# Patient Record
Sex: MALE | Race: WHITE | NOT HISPANIC OR LATINO | Employment: OTHER | URBAN - METROPOLITAN AREA
[De-identification: names, ages, dates, MRNs, and addresses within clinical notes are randomized per-mention and may not be internally consistent; named-entity substitution may affect disease eponyms.]

---

## 2016-06-06 LAB
CHOLEST/HDLC SERPL: 104 {RATIO}
HDLC SERPL-MCNC: 32 MG/DL
LDLC SERPL CALC-MCNC: 57 MG/DL
TRIGL SERPL-MCNC: 73 MG/DL

## 2016-12-09 LAB — A1C: 7.5

## 2017-01-13 DIAGNOSIS — E11.9 TYPE 2 DIABETES MELLITUS WITHOUT COMPLICATION: ICD-10-CM

## 2017-01-26 ENCOUNTER — TELEPHONE (OUTPATIENT)
Dept: PHYSICAL MEDICINE AND REHAB | Facility: CLINIC | Age: 77
End: 2017-01-26

## 2017-01-26 RX ORDER — TRAMADOL HYDROCHLORIDE 50 MG/1
TABLET ORAL
Qty: 150 TABLET | Refills: 0 | Status: SHIPPED | OUTPATIENT
Start: 2017-01-26 | End: 2017-01-27 | Stop reason: SDUPTHER

## 2017-01-26 NOTE — TELEPHONE ENCOUNTER
Pt is requesting to have a new script for Tramadol 50mg to be sent to [facility] [phone]     It was set to [facility] but he is no longer using that pharmacy     Pt contact number [phone]   Thanks       [facility] [address] [place], [place] - [address]

## 2017-01-27 RX ORDER — TRAMADOL HYDROCHLORIDE 50 MG/1
TABLET ORAL
Qty: 150 TABLET | Refills: 0 | Status: SHIPPED | OUTPATIENT
Start: 2017-01-27 | End: 2017-03-06

## 2017-01-30 ENCOUNTER — TELEPHONE (OUTPATIENT)
Dept: PHYSICAL MEDICINE AND REHAB | Facility: CLINIC | Age: 77
End: 2017-01-30

## 2017-01-30 NOTE — TELEPHONE ENCOUNTER
----- Message from Oleg Shell MD sent at 1/27/2017  4:08 PM CST -----  Contact: self @ [phone] or [phone]  Pls let him know I just sent tramadol to [facility].  ----- Message -----     From: Jesi Krause MA     Sent: 1/27/2017  11:29 AM       To: Oleg Shell MD        ----- Message -----     From: Mila Kennedy     Sent: 1/27/2017  10:47 AM       To: Sumaya CARPENTER Staff    Pts prescription for tramadol (ULTRAM) 50 mg tablet appears to have been sent to [facility] pharmacy.  Pt says he wanted the prescription sent to [facility] pharmacy on [address] in [place].  pls resend.

## 2017-02-15 ENCOUNTER — TELEPHONE (OUTPATIENT)
Dept: FAMILY MEDICINE | Facility: CLINIC | Age: 77
End: 2017-02-15

## 2017-02-15 NOTE — TELEPHONE ENCOUNTER
----- Message from Coco Nunez sent at 2/15/2017  3:12 PM CST -----  Mel [facility], calling to request that a nurse contacts the patient for an appt at [phone]. The patient was discharged from  on the 12th with a life vest. Also, Ms Leal would like the nurse to call her at [phone] Thank you!

## 2017-02-15 NOTE — TELEPHONE ENCOUNTER
Spoke w/PH navigator Mel,  patient need a TCC appt. Scheduled 2/20.  states they will contact patient's daughter for a face to face meeting to discuss (OT, PT, skilled nursing care). request sent to [facility] for records.

## 2017-02-20 DIAGNOSIS — I25.10 CORONARY ARTERY DISEASE INVOLVING NATIVE CORONARY ARTERY OF NATIVE HEART WITHOUT ANGINA PECTORIS: Primary | ICD-10-CM

## 2017-02-22 ENCOUNTER — TELEPHONE (OUTPATIENT)
Dept: CARDIOLOGY | Facility: CLINIC | Age: 77
End: 2017-02-22

## 2017-02-22 NOTE — TELEPHONE ENCOUNTER
Called patient to see how he was doing and to inquire as to what is going on, as he canceled his appointment to see Dr. Sykes tomorrow. Patient stated he is in the hospital at [place]. He has been extremely sob, and now he is on a life vest. Patient asked to get a call back in a week or two to see how he is. Agreed to call patient next week.

## 2017-03-02 ENCOUNTER — OFFICE VISIT (OUTPATIENT)
Dept: PODIATRY | Facility: CLINIC | Age: 77
End: 2017-03-02
Payer: MEDICARE

## 2017-03-02 VITALS
HEIGHT: 68 IN | WEIGHT: 182 LBS | SYSTOLIC BLOOD PRESSURE: 116 MMHG | DIASTOLIC BLOOD PRESSURE: 64 MMHG | BODY MASS INDEX: 27.58 KG/M2

## 2017-03-02 DIAGNOSIS — M20.10 HALLUX ABDUCTO VALGUS, UNSPECIFIED LATERALITY: ICD-10-CM

## 2017-03-02 DIAGNOSIS — L84 CORN OR CALLUS: ICD-10-CM

## 2017-03-02 DIAGNOSIS — E11.49 TYPE II DIABETES MELLITUS WITH NEUROLOGICAL MANIFESTATIONS: ICD-10-CM

## 2017-03-02 DIAGNOSIS — B35.1 ONYCHOMYCOSIS DUE TO DERMATOPHYTE: ICD-10-CM

## 2017-03-02 PROCEDURE — 11721 DEBRIDE NAIL 6 OR MORE: CPT | Mod: 59,Q9,S$GLB, | Performed by: PODIATRIST

## 2017-03-02 PROCEDURE — 99999 PR PBB SHADOW E&M-EST. PATIENT-LVL III: CPT | Mod: PBBFAC,,, | Performed by: PODIATRIST

## 2017-03-02 PROCEDURE — 99499 UNLISTED E&M SERVICE: CPT | Mod: S$PBB,,, | Performed by: PODIATRIST

## 2017-03-02 PROCEDURE — 99499 UNLISTED E&M SERVICE: CPT | Mod: S$GLB,,, | Performed by: PODIATRIST

## 2017-03-02 PROCEDURE — 11056 PARNG/CUTG B9 HYPRKR LES 2-4: CPT | Mod: Q9,S$GLB,, | Performed by: PODIATRIST

## 2017-03-02 RX ORDER — FUROSEMIDE 40 MG/1
40 TABLET ORAL DAILY
COMMUNITY
Start: 2017-02-13 | End: 2021-05-13

## 2017-03-02 RX ORDER — CEFUROXIME AXETIL 500 MG/1
TABLET ORAL
COMMUNITY
Start: 2017-02-25 | End: 2017-03-06 | Stop reason: ALTCHOICE

## 2017-03-02 RX ORDER — NITROGLYCERIN 0.3 MG/1
TABLET SUBLINGUAL
Refills: 2 | COMMUNITY
Start: 2016-12-28 | End: 2019-03-01 | Stop reason: SDUPTHER

## 2017-03-02 RX ORDER — WARFARIN SODIUM 5 MG/1
TABLET ORAL
COMMUNITY
Start: 2017-02-10

## 2017-03-02 RX ORDER — SPIRONOLACTONE 25 MG/1
25 TABLET ORAL DAILY
COMMUNITY
Start: 2017-02-27 | End: 2021-05-13

## 2017-03-02 NOTE — PROGRESS NOTES
Subjective:      Patient ID: Percy Ramirez is a 76 y.o. male.    Chief Complaint: Diabetic Foot Exam (tingling in toes Pcp Dr. Edmondson 07/29/2016); Diabetes Mellitus; Nail Care; and Foot Pain    Percy is a 76 y.o. male who presents to the clinic for evaluation and treatment of high risk feet. Percy has a past medical history of Allergy; Arthritis; CAD, multiple vessel; Cataract; Depression; Hyperlipidemia; Hypertension; Macular degeneration; S/P CABG x 2; and Type 2 diabetes mellitus with circulatory disorder. The patient's chief complaint is elongated, thickened toenails aggravated by increased weight bearing, shoe gear, pressure.    This patient has documented high risk feet requiring routine maintenance secondary to diabetes mellitis and those secondary complications of diabetes, as mentioned..    PCP: Kasie Edmondson MD    Date Last Seen by PCP:   Chief Complaint   Patient presents with    Diabetic Foot Exam     tingling in toes Pcp Dr. Edmondson 07/29/2016    Diabetes Mellitus    Nail Care    Foot Pain     Current shoe gear:  rx shoes (oxfords)    Hemoglobin A1C   Date Value Ref Range Status   10/22/2013 7.4 (H) 4.5 - 6.2 % Final     Patient Active Problem List   Diagnosis    Major depressive disorder, recurrent episode, mild    Hypertension    Type 2 diabetes, uncontrolled, with retinopathy    CAD, multiple vessel    S/P CABG x 2    Macular degeneration    Obstructive sleep apnea on CPAP    Anxiety state, unspecified    Arrhythmia    Insulin long-term use    Primary osteoarthritis of both knees    Chronic low back pain    DJD (degenerative joint disease), lumbar    Type 2 diabetes, uncontrolled, with neuropathy    Bilateral carotid artery disease    Hyperlipidemia LDL goal <100    Type 2 diabetes, uncontrolled, with peripheral circulatory disorder    COPD (chronic obstructive pulmonary disease)    Atherosclerosis of abdominal aorta    Uncontrolled type 2 diabetes mellitus with  "proteinuria or albuminuria    Overweight (BMI 25.0-29.9)     Current Outpatient Prescriptions on File Prior to Visit   Medication Sig Dispense Refill    amiodarone (PACERONE) 400 MG tablet TAKE 1 TABLET BY MOUTH TWICE A DAY FOR THE FIRST 2 WEEKS, THEN 1 TABLET ONCE PER THEREAFTER  1    aspirin (ECOTRIN) 81 MG EC tablet Take 81 mg by mouth. 1 Tablet, Delayed Release (E.C.) Oral Every day      BD INSULIN PEN NEEDLE UF SHORT 31 gauge x 5/16" Ndle       carvedilol (COREG) 25 MG tablet Take 25 mg by mouth. 1 Tablet Oral Twice a day       cinnamon bark 500 mg capsule Take 1,000 mg by mouth once daily.        CIPRODEX 0.3-0.1 % DrpS PLACE 3 DROP(S) IN AFFECTED EAR(S) 3 TIMES A DAY  0    clopidogrel (PLAVIX) 75 mg tablet Take 75 mg by mouth once daily.      clotrimazole-betamethasone 1-0.05% (LOTRISONE) cream       diazepam (VALIUM) 2 MG tablet Take 2 mg by mouth continuous prn.      duloxetine (CYMBALTA) 30 MG capsule Take 2 capsules (60 mg total) by mouth once daily. In 1-2 weeks, if no relief, may increase dose to two capsules once daily.      gabapentin (NEURONTIN) 300 MG capsule TAKE 2 CAPSULES BY MOUTH 3 TIMES DAILY 540 capsule 1    glimepiride (AMARYL) 4 MG tablet Take 8 mg by mouth daily with breakfast.       hydrocodone-acetaminophen 5-325mg (NORCO) 5-325 mg per tablet Take 1 tablet by mouth 2 (two) times daily as needed.  0    insulin NPH-insulin regular, 70/30, (NOVOLIN 70/30) 100 unit/mL (70-30) injection Inject 10 Units into the skin 2 (two) times daily.      insulin syringe-needle U-100 1/2 mL 31 x 5/16" Syrg       ketoconazole (NIZORAL) 2 % cream APPLY TO AFFECTED AREAS ON TOENAILS TWICE DAILY  2    losartan (COZAAR) 100 MG tablet Take 1 tablet (100 mg total) by mouth once daily. 90 tablet 3    metformin (GLUCOPHAGE) 500 MG tablet Take 1,000 mg by mouth 2 (two) times daily with meals. 2 Tablets in the morning, 2 Tablets in the evening      OM-3/DHA/EPA/FISH OIL/VIT D3 (FISH OIL-VIT D3 " ORAL) Take 2,000 Units by mouth once daily.       pravastatin (PRAVACHOL) 20 MG tablet Take 20 mg by mouth once daily.      propafenone (RHTHYMOL) 150 MG Tab Take 150 mg by mouth 2 (two) times daily.  3    tramadol (ULTRAM) 50 mg tablet TAKE 1 TO 2 TABLET(S) BY MOUTH 3 TIMES A DAY AS NEEDED 150 tablet 0    VIT C/VIT E AC/LUT/COPPER/ZINC (PRESERVISION LUTEIN ORAL) Take by mouth.      albuterol 90 mcg/actuation inhaler Inhale 2 puffs into the lungs every 6 (six) hours as needed for Wheezing or Shortness of Breath. 18 g 2     No current facility-administered medications on file prior to visit.      Review of patient's allergies indicates:   Allergen Reactions    Codeine Nausea Only     weak     Past Surgical History:   Procedure Laterality Date    broken elbow      left    EYE SURGERY      cataract    heart bypass      2004    HERNIA REPAIR      right    ROTATOR CUFF REPAIR      right  2004     Family History   Problem Relation Age of Onset    Arthritis Mother     Diabetes Mother     Stroke Mother     Heart attack Father     Cancer Brother     Throat cancer Brother     Diabetes Maternal Aunt     Diabetes Maternal Uncle     Heart disease Maternal Uncle     Diabetes Paternal Aunt     Heart disease Paternal Aunt     Heart disease Paternal Uncle     Heart disease Maternal Grandmother     Cancer Maternal Grandfather      Social History     Social History    Marital status:      Spouse name: N/A    Number of children: 4    Years of education: GED     Occupational History    retired tug       Social History Main Topics    Smoking status: Former Smoker     Packs/day: 3.00     Years: 45.00     Types: Cigarettes     Quit date: 4/20/2004    Smokeless tobacco: Never Used    Alcohol use Yes      Comment: was heavy drinker 35 years ago, rare use now    Drug use: No    Sexual activity: Yes     Partners: Female     Other Topics Concern    Not on file     Social History Narrative  "    Review of Systems   Constitution: Negative for chills, fever and weakness.   Cardiovascular: Negative for claudication and leg swelling.   Respiratory: Positive for cough. Negative for shortness of breath.    Skin: Positive for dry skin and nail changes. Negative for itching and rash.   Musculoskeletal: Positive for arthritis, back pain and joint pain. Negative for falls, joint swelling and muscle weakness.   Gastrointestinal: Negative for diarrhea, nausea and vomiting.   Neurological: Positive for numbness and paresthesias. Negative for tremors.   Psychiatric/Behavioral: Negative for altered mental status and hallucinations.           Objective:       Vitals:    03/02/17 1036   BP: 116/64   Weight: 82.6 kg (182 lb)   Height: 5' 8" (1.727 m)   PainSc:   8   PainLoc: Toe       Physical Exam   Constitutional:   General: Pt. is well-developed, well-nourished, appears stated age, in no acute distress, alert and oriented x 3. No evidence of depression, anxiety, or agitation. Calm, cooperative, and communicative. Appropriate interactions and affect.       Cardiovascular:   Pulses:       Dorsalis pedis pulses are 2+ on the right side, and 2+ on the left side.        Posterior tibial pulses are 1+ on the right side, and 1+ on the left side.   There is decreased digital hair.   Musculoskeletal:        Right foot: There is normal range of motion.        Left foot: There is normal range of motion.   Adequate joint range of motion without pain, limitation, nor crepitation Bilateral feet and ankle joints. Muscle strength is 5/5 in all groups bilaterally.   Neurological: A sensory deficit is present.   [employer] 5.07 monofilamant testing is diminished Farhad feet. Sharp/dull sensation diminished Bilaterally   Skin: Skin is warm, dry and intact. No abrasion, no ecchymosis, no lesion and no rash noted. He is not diaphoretic. No cyanosis or erythema. No pallor. Nails show no clubbing.   Toenails 1-5 bilaterally are elongated " by 2-3 mm, thickened by 2-3 mm, discolored/yellowed, dystrophic, brittle with subungual debris.    Focal hyperkeratotic lesion consisting entirely of hyperkeratotic tissue without open skin, drainage, pus, fluctuance, malodor, or signs of infection: medial IPJ of hallux concha    Interdigital Spaces clean, dry and without evidence of break in skin integrity   Psychiatric: He has a normal mood and affect. His speech is normal.   Nursing note and vitals reviewed.        Assessment:       Encounter Diagnoses   Name Primary?    Type 2 diabetes, uncontrolled, with peripheral circulatory disorder Yes    Type II diabetes mellitus with neurological manifestations     Hallux abducto valgus, unspecified laterality     Onychomycosis due to dermatophyte     Corn or callus          Plan:       Percy was seen today for diabetic foot exam, diabetes mellitus, nail care and foot pain.    Diagnoses and all orders for this visit:    Type 2 diabetes, uncontrolled, with peripheral circulatory disorder    Type II diabetes mellitus with neurological manifestations    Hallux abducto valgus, unspecified laterality    Onychomycosis due to dermatophyte    Corn or callus      I counseled the patient on his conditions, their implications and medical management.      - Shoe inspection. Diabetic Foot Education. Patient reminded of the importance of good nutrition and blood sugar control to help prevent podiatric complications of diabetes. Patient instructed on proper foot hygeine. We discussed wearing proper shoe gear, daily foot inspections, never walking without protective shoe gear, never putting sharp instruments to feet.      - With patient's permission, nails were aggressively reduced and debrided x 10 to their soft tissue attachment mechanically and with electric , removing all offending nail and debris. After cleansing the  area w/ alcohol prep pad the above mentioned hyperkeratosis was trimmed utilizing No 15 scapel, to a  smooth base with out incident. Patient tolerated this  well and reported comfort to the area of medial IPJ of hallux concha    - Patient relates relief following the procedure. He will continue to monitor the areas daily, inspect his feet, wear protective shoe gear when ambulatory, moisturizer to maintain skin integrity and follow in this office in approximately 2-3 months, sooner p.r.n.

## 2017-03-02 NOTE — MR AVS SNAPSHOT
[facility]  [address]  [place] [address]  Phone: [phone]                  Percy Ramirez   3/2/2017 10:30 AM   Office Visit    Description:  Male : 1940   Provider:  Marifer Romero DPM   Department:  [facility]           Reason for Visit     Diabetic Foot Exam     Diabetes Mellitus     Nail Care     Foot Pain           Diagnoses this Visit        Comments    Type 2 diabetes, uncontrolled, with peripheral circulatory disorder    -  Primary     Type II diabetes mellitus with neurological manifestations         Hallux abducto valgus, unspecified laterality         Onychomycosis due to dermatophyte         Corn or callus                To Do List           Future Appointments        Provider Department Dept Phone    3/6/2017 1:20 PM Kasie Edmondson MD [facility] [phone]      Goals (5 Years of Data)              10/22/13    HEMOGLOBIN A1C < 7.5   7.4      [facility]     [facility] -  Assistance  Registered nurses in the [facility] Center provide clinical advisement, health education, appointment booking, and other advisory services.  Call for this free service at [phone].             Medications           Message regarding Medications     Verify the changes and/or additions to your medication regime listed below are the same as discussed with your clinician today.  If any of these changes or additions are incorrect, please notify your healthcare provider.             Verify that the below list of medications is an accurate representation of the medications you are currently taking.  If none reported, the list may be blank. If incorrect, please contact your healthcare provider. Carry this list with you in case of emergency.           Current Medications     amiodarone (PACERONE) 400 MG tablet TAKE 1 TABLET BY MOUTH TWICE A DAY FOR THE FIRST 2 WEEKS, THEN 1 TABLET ONCE PER THEREAFTER    aspirin (ECOTRIN) 81 MG EC  "tablet Take 81 mg by mouth. 1 Tablet, Delayed Release (E.C.) Oral Every day    BD INSULIN PEN NEEDLE UF SHORT 31 gauge x 5/16" Ndle     carvedilol (COREG) 25 MG tablet Take 25 mg by mouth. 1 Tablet Oral Twice a day     cefUROXime (CEFTIN) 500 MG tablet     cinnamon bark 500 mg capsule Take 1,000 mg by mouth once daily.      CIPRODEX 0.3-0.1 % DrpS PLACE 3 DROP(S) IN AFFECTED EAR(S) 3 TIMES A DAY    clopidogrel (PLAVIX) 75 mg tablet Take 75 mg by mouth once daily.    clotrimazole-betamethasone 1-0.05% (LOTRISONE) cream     diazepam (VALIUM) 2 MG tablet Take 2 mg by mouth continuous prn.    duloxetine (CYMBALTA) 30 MG capsule Take 2 capsules (60 mg total) by mouth once daily. In 1-2 weeks, if no relief, may increase dose to two capsules once daily.    furosemide (LASIX) 40 MG tablet     gabapentin (NEURONTIN) 300 MG capsule TAKE 2 CAPSULES BY MOUTH 3 TIMES DAILY    glimepiride (AMARYL) 4 MG tablet Take 8 mg by mouth daily with breakfast.     hydrocodone-acetaminophen 5-325mg (NORCO) 5-325 mg per tablet Take 1 tablet by mouth 2 (two) times daily as needed.    insulin NPH-insulin regular, 70/30, (NOVOLIN 70/30) 100 unit/mL (70-30) injection Inject 10 Units into the skin 2 (two) times daily.    insulin syringe-needle U-100 1/2 mL 31 x 5/16" Syrg     ketoconazole (NIZORAL) 2 % cream APPLY TO AFFECTED AREAS ON TOENAILS TWICE DAILY    losartan (COZAAR) 100 MG tablet Take 1 tablet (100 mg total) by mouth once daily.    metformin (GLUCOPHAGE) 500 MG tablet Take 1,000 mg by mouth 2 (two) times daily with meals. 2 Tablets in the morning, 2 Tablets in the evening    nitroGLYCERIN (NITROSTAT) 0.3 MG SL tablet PLACE 1 TABLET UNDER TONGUE AS NEEDED FOR CHEST PAIN EVERY 5 MINUTES, UP TO 3 DOSES IN 15MINUTES    OM-3/DHA/EPA/FISH OIL/VIT D3 (FISH OIL-VIT D3 ORAL) Take 2,000 Units by mouth once daily.     pravastatin (PRAVACHOL) 20 MG tablet Take 20 mg by mouth once daily.    propafenone (RHTHYMOL) 150 MG Tab Take 150 mg by mouth 2 " (two) times daily.    spironolactone (ALDACTONE) 25 MG tablet     tramadol (ULTRAM) 50 mg tablet TAKE 1 TO 2 TABLET(S) BY MOUTH 3 TIMES A DAY AS NEEDED    VIT C/VIT E AC/LUT/COPPER/ZINC (PRESERVISION LUTEIN ORAL) Take by mouth.    warfarin (COUMADIN) 5 MG tablet     albuterol 90 mcg/actuation inhaler Inhale 2 puffs into the lungs every 6 (six) hours as needed for Wheezing or Shortness of Breath.           Clinical Reference Information           Your Vitals Were     BP                   116/64           Blood Pressure          Most Recent Value    BP  116/64      Allergies as of 3/2/2017     Codeine      Immunizations Administered on Date of Encounter - 3/2/2017     None      [facility] Sign-Up     Activating your [facility] account is as easy as 1-2-3!     1) Visit [email or web address], select Sign Up Now, enter this activation code and your date of birth, then select Next.  Activation code not generated  Current Patient Portal Status: Account disabled      2) Create a username and password to use when you visit [facility] in the future and select a security question in case you lose your password and select Next.    3) Enter your e-mail address and click Sign Up!    Additional Information  If you have questions, please e-mail [email or web address] or call [phone] to talk to our [facility] staff. Remember, [facility] is NOT to be used for urgent needs. For medical emergencies, dial 911.         Instructions    Recommend lotions: eucerin, aquaphor, A&D ointment, gold bond for diabetics, sween    Shoe recommendations: (try 6pm.com, zappos.com , nordstromrack.com, or shoes.com for discounted prices) you can visit DSW shoes in [place] as well    Asics (GT 1000 or gel foundations), new balance, saucony (stabil c3),  Mahoney (transcend), vionic, propet (tennis shoe)    soft brand, clarks, crocs, aerosoles, naturalizers, SAS, ecco, dean, ngoc lara, rockports (dress shoes)    Vionic, volitiles, burkenstocks, fitflops, propet  (sandals)    Nike comfort thong sandals, crocs (house shoes)    Nail Home remedy:  Vicks Vapor rub OR Listerine and apple cider vinegar in a spray bottle to nails    Occasional soaks for 15-20 mins in luke warm water with 1 cup of listerine and 1 cup of apple cider vinegar are ok You may add several drops of oil of oregano or tea tree oil as well      Diabetes: Inspecting Your Feet  Diabetes increases your chances of developing foot problems. So inspect your feet every day. This helps you find small skin irritations before they become serious infections. If you have trouble seeing the bottoms of your feet, use a mirror or ask a family member or friend to help.     Pressure spots on the bottom of the foot are common areas where problems develop.   How to check your feet  Below are tips to help you look for foot problems. Try to check your feet at the same time each day, such as when you get out of bed in the morning:  · Check the top of each foot. The tops of toes, back of the heel, and outer edge of the foot can get a lot of rubbing from poor-fitting shoes.  · Check the bottom of each foot. Daily wear and tear often leads to problems at pressure spots.  · Check the toes and nails. Fungal infections often occur between toes. Toenail problems can also be a sign of fungal infections or lead to breaks in the skin.  · Check your shoes, too. Loose objects inside a shoe can injure the foot. Use your hand to feel inside your shoes for things like alfredo, loose stitching, or rough areas that could irritate your skin.  Warning signs  Look for any color changes in the foot. Redness with streaks can signal a severe infection, which needs immediate medical attention. Tell your doctor right away if you have any of these problems:  · Swelling, sometimes with color changes, may be a sign of poor blood flow or infection. Symptoms include tenderness and an increase in the size of your foot.  · Warm or hot areas on your feet may be  signs of infection. A foot that is cold may not be getting enough blood.  · Sensations such as burning, tingling, or pins and needles can be signs of a problem. Also check for areas that may be numb.  · Hot spots are caused by friction or pressure. Look for hot spots in areas that get a lot of rubbing. Hot spots can turn into blisters, calluses, or sores.  · Cracks and sores are caused by dry or irritated skin. They are a sign that the skin is breaking down, which can lead to infection.  · Toenail problems to watch for include nails growing into the skin (ingrown toenail) and causing redness or pain. Thick, yellow, or discolored nails can signal a fungal infection.  · Drainage and odor can develop from untreated sores and ulcers. Call your doctor right away if you notice white or yellow drainage, bleeding, or unpleasant odor.   © 0914-5465 [employer]. [address] [place] [address]. All rights reserved. This information is not intended as a substitute for professional medical care. Always follow your healthcare professional's instructions.               Language Assistance Services     ATTENTION: Language assistance services are available, free of charge. Please call [phone].      ATENCIÓN: Si jessicala aj, tiene a huerta disposición servicios gratuitos de asistencia lingüística. Llame al [phone].     [facility] Ý: N?u b?n nói Ti?ng Vi?t, có các d?ch v? h? tr? ngôn ng? mi?n phí dành cho b?n. G?i s? [phone].         [facility] complies with applicable Federal civil rights laws and does not discriminate on the basis of race, color, national origin, age, disability, or sex.

## 2017-03-02 NOTE — PATIENT INSTRUCTIONS
Recommend lotions: eucerin, aquaphor, A&D ointment, gold bond for diabetics, sween    Shoe recommendations: (try 6pm.com, zappos.com , nordstromrack.com, or shoes.com for discounted prices) you can visit [employer]SW shoes in [place] as well    Asics (GT 1000 or gel foundations), new balance, saucony (stabil c3),  Mahoney (transcend), vionic, propet (tennis shoe)    soft brand, clarks, crocs, aerosoles, naturalizers, SAS, ecco, dean, ngoc lara, rockports (dress shoes)    Vionic, volitiles, burkenstocks, fitflops, propet (sandals)    Nike comfort thong sandals, crocs (house shoes)    Nail Home remedy:  Vicks Vapor rub OR Listerine and apple cider vinegar in a spray bottle to nails    Occasional soaks for 15-20 mins in luke warm water with 1 cup of listerine and 1 cup of apple cider vinegar are ok You may add several drops of oil of oregano or tea tree oil as well      Diabetes: Inspecting Your Feet  Diabetes increases your chances of developing foot problems. So inspect your feet every day. This helps you find small skin irritations before they become serious infections. If you have trouble seeing the bottoms of your feet, use a mirror or ask a family member or friend to help.     Pressure spots on the bottom of the foot are common areas where problems develop.   How to check your feet  Below are tips to help you look for foot problems. Try to check your feet at the same time each day, such as when you get out of bed in the morning:  · Check the top of each foot. The tops of toes, back of the heel, and outer edge of the foot can get a lot of rubbing from poor-fitting shoes.  · Check the bottom of each foot. Daily wear and tear often leads to problems at pressure spots.  · Check the toes and nails. Fungal infections often occur between toes. Toenail problems can also be a sign of fungal infections or lead to breaks in the skin.  · Check your shoes, too. Loose objects inside a shoe can injure the foot. Use your hand to feel  inside your shoes for things like alfredo, loose stitching, or rough areas that could irritate your skin.  Warning signs  Look for any color changes in the foot. Redness with streaks can signal a severe infection, which needs immediate medical attention. Tell your doctor right away if you have any of these problems:  · Swelling, sometimes with color changes, may be a sign of poor blood flow or infection. Symptoms include tenderness and an increase in the size of your foot.  · Warm or hot areas on your feet may be signs of infection. A foot that is cold may not be getting enough blood.  · Sensations such as burning, tingling, or pins and needles can be signs of a problem. Also check for areas that may be numb.  · Hot spots are caused by friction or pressure. Look for hot spots in areas that get a lot of rubbing. Hot spots can turn into blisters, calluses, or sores.  · Cracks and sores are caused by dry or irritated skin. They are a sign that the skin is breaking down, which can lead to infection.  · Toenail problems to watch for include nails growing into the skin (ingrown toenail) and causing redness or pain. Thick, yellow, or discolored nails can signal a fungal infection.  · Drainage and odor can develop from untreated sores and ulcers. Call your doctor right away if you notice white or yellow drainage, bleeding, or unpleasant odor.   © 4455-4336 [employer]. [address] [place] [address], [place] [address]. All rights reserved. This information is not intended as a substitute for professional medical care. Always follow your healthcare professional's instructions.

## 2017-03-06 ENCOUNTER — OFFICE VISIT (OUTPATIENT)
Dept: FAMILY MEDICINE | Facility: CLINIC | Age: 77
End: 2017-03-06
Payer: MEDICARE

## 2017-03-06 VITALS
HEART RATE: 61 BPM | DIASTOLIC BLOOD PRESSURE: 54 MMHG | TEMPERATURE: 98 F | OXYGEN SATURATION: 98 % | WEIGHT: 171.44 LBS | HEIGHT: 68 IN | SYSTOLIC BLOOD PRESSURE: 106 MMHG | BODY MASS INDEX: 25.98 KG/M2

## 2017-03-06 DIAGNOSIS — D69.2 SENILE PURPURA: ICD-10-CM

## 2017-03-06 DIAGNOSIS — Z00.00 ROUTINE MEDICAL EXAM: Primary | ICD-10-CM

## 2017-03-06 DIAGNOSIS — E66.3 OVERWEIGHT (BMI 25.0-29.9): ICD-10-CM

## 2017-03-06 DIAGNOSIS — I20.89 CHRONIC STABLE ANGINA: ICD-10-CM

## 2017-03-06 DIAGNOSIS — I48.0 PAROXYSMAL ATRIAL FIBRILLATION: ICD-10-CM

## 2017-03-06 DIAGNOSIS — G47.33 OBSTRUCTIVE SLEEP APNEA ON CPAP: ICD-10-CM

## 2017-03-06 DIAGNOSIS — I70.0 ATHEROSCLEROSIS OF ABDOMINAL AORTA: ICD-10-CM

## 2017-03-06 DIAGNOSIS — Z79.4 UNCONTROLLED TYPE 2 DIABETES MELLITUS WITH RETINOPATHY, WITH LONG-TERM CURRENT USE OF INSULIN, MACULAR EDEMA PRESENCE UNSPECIFIED, UNSPECIFIED LATERALITY, UNSPECIFIED RETINOPATHY SEVERITY: ICD-10-CM

## 2017-03-06 DIAGNOSIS — R53.1 WEAKNESS: ICD-10-CM

## 2017-03-06 DIAGNOSIS — F33.0 MAJOR DEPRESSIVE DISORDER, RECURRENT EPISODE, MILD: ICD-10-CM

## 2017-03-06 DIAGNOSIS — J44.9 CHRONIC OBSTRUCTIVE PULMONARY DISEASE, UNSPECIFIED COPD TYPE: ICD-10-CM

## 2017-03-06 DIAGNOSIS — E11.65 UNCONTROLLED TYPE 2 DIABETES MELLITUS WITH RETINOPATHY, WITH LONG-TERM CURRENT USE OF INSULIN, MACULAR EDEMA PRESENCE UNSPECIFIED, UNSPECIFIED LATERALITY, UNSPECIFIED RETINOPATHY SEVERITY: ICD-10-CM

## 2017-03-06 DIAGNOSIS — E78.5 HYPERLIPIDEMIA LDL GOAL <100: ICD-10-CM

## 2017-03-06 DIAGNOSIS — E11.319 UNCONTROLLED TYPE 2 DIABETES MELLITUS WITH RETINOPATHY, WITH LONG-TERM CURRENT USE OF INSULIN, MACULAR EDEMA PRESENCE UNSPECIFIED, UNSPECIFIED LATERALITY, UNSPECIFIED RETINOPATHY SEVERITY: ICD-10-CM

## 2017-03-06 PROCEDURE — 3078F DIAST BP <80 MM HG: CPT | Mod: S$GLB,,, | Performed by: INTERNAL MEDICINE

## 2017-03-06 PROCEDURE — 3074F SYST BP LT 130 MM HG: CPT | Mod: S$GLB,,, | Performed by: INTERNAL MEDICINE

## 2017-03-06 PROCEDURE — 99999 PR PBB SHADOW E&M-EST. PATIENT-LVL III: CPT | Mod: PBBFAC,,, | Performed by: INTERNAL MEDICINE

## 2017-03-06 PROCEDURE — 99397 PER PM REEVAL EST PAT 65+ YR: CPT | Mod: S$GLB,,, | Performed by: INTERNAL MEDICINE

## 2017-03-06 PROCEDURE — 99499 UNLISTED E&M SERVICE: CPT | Mod: S$PBB,,, | Performed by: INTERNAL MEDICINE

## 2017-03-06 NOTE — MR AVS SNAPSHOT
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, with long-term current use of insulin, macular edema presence unspecified, unspecified laterality, unspecified retinopathy severity         Type 2 diabetes, uncontrolled, with peripheral circulatory disorder         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         Paroxysmal atrial fibrillation         Chronic stable angina         Hyperlipidemia LDL goal <100         Chronic obstructive pulmonary disease, unspecified COPD type         Obstructive sleep apnea on CPAP         Atherosclerosis of abdominal aorta         Major depressive disorder, recurrent episode, mild         Weakness         Senile purpura         Overweight (BMI 25.0-29.9)                To Do List           Future Appointments        Provider Department Dept Phone    2017 11:00 AM Marifer Romero DPM [facility] - Podiatry [phone]      Goals (5 Years of Data)              10/22/13    HEMOGLOBIN A1C < 7.5   7.4      Follow-Up and Disposition     Return in about 6 months (around 2017), or if symptoms worsen or fail to improve, for follow up chronic medical conditions..      [facility]     [facility] On [facility] Nurse Care Line -  Assistance  Registered nurses in the [facility] On Call Center provide clinical advisement, health education, appointment booking, and other advisory services.  Call for this free service at [phone].             Medications           Message regarding Medications     Verify the changes and/or  "additions to your medication regime listed below are the same as discussed with your clinician today.  If any of these changes or additions are incorrect, please notify your healthcare provider.        STOP taking these medications     cefUROXime (CEFTIN) 500 MG tablet     hydrocodone-acetaminophen 5-325mg (NORCO) 5-325 mg per tablet Take 1 tablet by mouth 2 (two) times daily as needed.    propafenone (RHTHYMOL) 150 MG Tab Take 150 mg by mouth 2 (two) times daily.    tramadol (ULTRAM) 50 mg tablet TAKE 1 TO 2 TABLET(S) BY MOUTH 3 TIMES A DAY AS NEEDED    amiodarone (PACERONE) 400 MG tablet TAKE 1 TABLET BY MOUTH TWICE A DAY FOR THE FIRST 2 WEEKS, THEN 1 TABLET ONCE PER THEREAFTER           Verify that the below list of medications is an accurate representation of the medications you are currently taking.  If none reported, the list may be blank. If incorrect, please contact your healthcare provider. Carry this list with you in case of emergency.           Current Medications     aspirin (ECOTRIN) 81 MG EC tablet Take 81 mg by mouth. 1 Tablet, Delayed Release (E.C.) Oral Every day    BD INSULIN PEN NEEDLE UF SHORT 31 gauge x 5/16" Ndle     carvedilol (COREG) 25 MG tablet Take 25 mg by mouth. 1 Tablet Oral Twice a day     CIPRODEX 0.3-0.1 % DrpS PLACE 3 DROP(S) IN AFFECTED EAR(S) 3 TIMES A DAY    clopidogrel (PLAVIX) 75 mg tablet Take 75 mg by mouth once daily.    diazepam (VALIUM) 2 MG tablet Take 2 mg by mouth continuous prn.    duloxetine (CYMBALTA) 30 MG capsule Take 2 capsules (60 mg total) by mouth once daily. In 1-2 weeks, if no relief, may increase dose to two capsules once daily.    furosemide (LASIX) 40 MG tablet     gabapentin (NEURONTIN) 300 MG capsule TAKE 2 CAPSULES BY MOUTH 3 TIMES DAILY    glimepiride (AMARYL) 4 MG tablet Take 8 mg by mouth daily with breakfast.     insulin NPH-insulin regular, 70/30, (NOVOLIN 70/30) 100 unit/mL (70-30) injection Inject into the skin. Inject 5 units at breakfast and " "inject 10 units at night    insulin syringe-needle U-100 1/2 mL 31 x 5/16" Syrg     ketoconazole (NIZORAL) 2 % cream APPLY TO AFFECTED AREAS ON TOENAILS TWICE DAILY    losartan (COZAAR) 100 MG tablet Take 1 tablet (100 mg total) by mouth once daily.    metformin (GLUCOPHAGE) 500 MG tablet Take 1,000 mg by mouth 2 (two) times daily with meals. 2 Tablets in the morning, 2 Tablets in the evening    nitroGLYCERIN (NITROSTAT) 0.3 MG SL tablet PLACE 1 TABLET UNDER TONGUE AS NEEDED FOR CHEST PAIN EVERY 5 MINUTES, UP TO 3 DOSES IN 15MINUTES    pravastatin (PRAVACHOL) 20 MG tablet Take 20 mg by mouth once daily.    spironolactone (ALDACTONE) 25 MG tablet 25 mg once daily.     warfarin (COUMADIN) 5 MG tablet     albuterol 90 mcg/actuation inhaler Inhale 2 puffs into the lungs every 6 (six) hours as needed for Wheezing or Shortness of Breath.    cinnamon bark 500 mg capsule Take 1,000 mg by mouth once daily.      clotrimazole-betamethasone 1-0.05% (LOTRISONE) cream     OM-3/DHA/EPA/FISH OIL/VIT D3 (FISH OIL-VIT D3 ORAL) Take 2,000 Units by mouth once daily.     VIT C/VIT E AC/LUT/COPPER/ZINC (PRESERVISION LUTEIN ORAL) Take by mouth.           Clinical Reference Information           Your Vitals Were     BP Pulse Temp Height Weight SpO2    106/54 (BP Location: Left arm, Patient Position: Sitting, BP Method: Manual) 61 97.8 °F (36.6 °C) (Oral) 5' 8" (1.727 m) 77.7 kg (171 lb 6.5 oz) 98%    BMI                26.06 kg/m2          Blood Pressure          Most Recent Value    BP  (!)  106/54      Allergies as of 3/6/2017     Codeine      Immunizations Administered on Date of Encounter - 3/6/2017     None      Orders Placed During Today's Visit      Normal Orders This Visit    BATH/SHOWER CHAIR FOR HOME USE       [facility] Sign-Up     Activating your [facility] account is as easy as 1-2-3!     1) Visit [email or web address], select Sign Up Now, enter this activation code and your date of birth, then select Next.  Activation code not " generated  Current Patient Portal Status: Account disabled      2) Create a username and password to use when you visit [facility] in the future and select a security question in case you lose your password and select Next.    3) Enter your e-mail address and click Sign Up!    Additional Information  If you have questions, please e-mail [email or web address] or call [phone] to talk to our [email or web address] staff. Remember, [facility] is NOT to be used for urgent needs. For medical emergencies, dial 911.         Language Assistance Services     ATTENTION: Language assistance services are available, free of charge. Please call [phone].      ATENCIÓN: Si habla aj, tiene a huerta disposición servicios gratuitos de asistencia lingüística. Llame al [phone].     CHÚ Ý: N?u b?n nói Ti?ng Vi?t, có các d?ch v? h? tr? ngôn ng? mi?n phí dành cho b?n. G?i s? [phone].         [facility] complies with applicable Federal civil rights laws and does not discriminate on the basis of race, color, national origin, age, disability, or sex.

## 2017-03-06 NOTE — PROGRESS NOTES
HISTORY OF PRESENT ILLNESS:  Percy Ramirez is a 76 y.o. male who presents to the clinic today for a routine medical physical exam. His last physical exam was approximately 1 years(s) ago.    The patient was hospitalized at [facility] last month for atrial fibrillation and congestive heart failure exacerbation.  They had attempted cardioversion the day prior to his admission.  He is now wearing a LifeVest.  He is followed closely by cardiology.  He feels better today.  He denies any significant shortness of breath or cardiac chest pain.      PAST MEDICAL HISTORY:  Past Medical History:   Diagnosis Date    Allergy     Arthritis     CAD, multiple vessel     DR Melissa    Cataract     Depression     Hyperlipidemia     Hypertension     Macular degeneration     Dr Razo for injection, Jennifer for eye    S/P CABG x 2     Type 2 diabetes mellitus with circulatory disorder     Dr. Aquino       PAST SURGICAL HISTORY:  Past Surgical History:   Procedure Laterality Date    broken elbow      left    EYE SURGERY      cataract    heart bypass      2004    HERNIA REPAIR      right    ROTATOR CUFF REPAIR      right  2004       SOCIAL HISTORY:  Social History     Social History    Marital status:      Spouse name: N/A    Number of children: 4    Years of education: GED     Occupational History    retired [employer]       Social History Main Topics    Smoking status: Former Smoker     Packs/day: 3.00     Years: 45.00     Types: Cigarettes     Quit date: 4/20/2004    Smokeless tobacco: Never Used    Alcohol use Yes      Comment: was heavy drinker 35 years ago, rare use now    Drug use: No    Sexual activity: Yes     Partners: Female     Other Topics Concern    Not on file     Social History Narrative    No narrative on file       FAMILY HISTORY:  Family History   Problem Relation Age of Onset    Arthritis Mother     Diabetes Mother     Stroke Mother     Heart attack Father      "Cancer Brother     Throat cancer Brother     Diabetes Maternal Aunt     Diabetes Maternal Uncle     Heart disease Maternal Uncle     Diabetes Paternal Aunt     Heart disease Paternal Aunt     Heart disease Paternal Uncle     Heart disease Maternal Grandmother     Cancer Maternal Grandfather        ALLERGIES AND MEDICATIONS: updated and reviewed.  Review of patient's allergies indicates:   Allergen Reactions    Codeine Nausea Only     weak     Medication List with Changes/Refills   Current Medications    ALBUTEROL 90 MCG/ACTUATION INHALER    Inhale 2 puffs into the lungs every 6 (six) hours as needed for Wheezing or Shortness of Breath.    ASPIRIN (ECOTRIN) 81 MG EC TABLET    Take 81 mg by mouth. 1 Tablet, Delayed Release (E.C.) Oral Every day    BD INSULIN PEN NEEDLE UF SHORT 31 GAUGE X 5/16" NDLE        CARVEDILOL (COREG) 25 MG TABLET    Take 25 mg by mouth. 1 Tablet Oral Twice a day     CINNAMON BARK 500 MG CAPSULE    Take 1,000 mg by mouth once daily.      CIPRODEX 0.3-0.1 % DRPS    PLACE 3 DROP(S) IN AFFECTED EAR(S) 3 TIMES A DAY    CLOPIDOGREL (PLAVIX) 75 MG TABLET    Take 75 mg by mouth once daily.    CLOTRIMAZOLE-BETAMETHASONE 1-0.05% (LOTRISONE) CREAM        DIAZEPAM (VALIUM) 2 MG TABLET    Take 2 mg by mouth continuous prn.    DULOXETINE (CYMBALTA) 30 MG CAPSULE    Take 2 capsules (60 mg total) by mouth once daily. In 1-2 weeks, if no relief, may increase dose to two capsules once daily.    FUROSEMIDE (LASIX) 40 MG TABLET        GABAPENTIN (NEURONTIN) 300 MG CAPSULE    TAKE 2 CAPSULES BY MOUTH 3 TIMES DAILY    GLIMEPIRIDE (AMARYL) 4 MG TABLET    Take 8 mg by mouth daily with breakfast.     INSULIN NPH-INSULIN REGULAR, 70/30, (NOVOLIN 70/30) 100 UNIT/ML (70-30) INJECTION    Inject into the skin. Inject 5 units at breakfast and inject 10 units at night    INSULIN SYRINGE-NEEDLE U-100 1/2 ML 31 X 5/16" SYRG        KETOCONAZOLE (NIZORAL) 2 % CREAM    APPLY TO AFFECTED AREAS ON TOENAILS TWICE DAILY    " LOSARTAN (COZAAR) 100 MG TABLET    Take 1 tablet (100 mg total) by mouth once daily.    METFORMIN (GLUCOPHAGE) 500 MG TABLET    Take 1,000 mg by mouth 2 (two) times daily with meals. 2 Tablets in the morning, 2 Tablets in the evening    NITROGLYCERIN (NITROSTAT) 0.3 MG SL TABLET    PLACE 1 TABLET UNDER TONGUE AS NEEDED FOR CHEST PAIN EVERY 5 MINUTES, UP TO 3 DOSES IN 15MINUTES    OM-3/DHA/EPA/FISH OIL/VIT D3 (FISH OIL-VIT D3 ORAL)    Take 2,000 Units by mouth once daily.     PRAVASTATIN (PRAVACHOL) 20 MG TABLET    Take 20 mg by mouth once daily.    SPIRONOLACTONE (ALDACTONE) 25 MG TABLET    25 mg once daily.     VIT C/VIT E AC/LUT/COPPER/ZINC (PRESERVISION LUTEIN ORAL)    Take by mouth.    WARFARIN (COUMADIN) 5 MG TABLET       Discontinued Medications    AMIODARONE (PACERONE) 400 MG TABLET    TAKE 1 TABLET BY MOUTH TWICE A DAY FOR THE FIRST 2 WEEKS, THEN 1 TABLET ONCE PER THEREAFTER    CEFUROXIME (CEFTIN) 500 MG TABLET        HYDROCODONE-ACETAMINOPHEN 5-325MG (NORCO) 5-325 MG PER TABLET    Take 1 tablet by mouth 2 (two) times daily as needed.    PROPAFENONE (RHTHYMOL) 150 MG TAB    Take 150 mg by mouth 2 (two) times daily.    TRAMADOL (ULTRAM) 50 MG TABLET    TAKE 1 TO 2 TABLET(S) BY MOUTH 3 TIMES A DAY AS NEEDED             SCREENING HISTORY:  Health Maintenance       Date Due Completion Date    Lipid Panel 6/6/2017 6/6/2016 (Done)    Override on 6/6/2016: Done (total 104, HDL 32, LDL 57, TG 73)    Override on 1/27/2015: Done (HDL=36, TG=38, LDL=57)    Urine Microalbumin 6/6/2017 6/6/2016 (Done)    Override on 6/6/2016: Done (43)    Override on 1/27/2015: Done (Elevated)    Hemoglobin A1c 6/9/2017 12/9/2016 (Done)    Override on 12/9/2016: Done (A1c - 7.5)    Override on 6/6/2016: Done (7.0)    Override on 1/27/2015: Done (7.9%)    Foot Exam 7/14/2017 7/14/2016    Colonoscopy 8/6/2017 8/6/2007 (Done)    Override on 8/6/2007: Done    Eye Exam 2/3/2018 2/3/2017 (Done)    Override on 2/3/2017: Done (Dr. Arevalo)     "Override on 1/22/2016: Done (Javiitmeier - mild bilateral diabetic retinopathy; severe bilateral dry macular degeneration)    Override on 5/8/2015: Done    Override on 10/10/2014: Done    TETANUS VACCINE 5/23/2023 5/23/2013            REVIEW OF SYSTEMS:  The patient reports fair dietary habits.  The patient does not exercise regularly.  General ROS: negative for - chills, fever or malaise  Psychological ROS: positive for - depression  negative for - memory difficulties, obsessive thoughts or suicidal ideation  Ophthalmic ROS: negative for - eye pain; he has blurry vision due to macular degeneration  ENT ROS: negative for - epistaxis, oral lesions or sore throat  Allergy and Immunology ROS: negative for - hives  Hematological and Lymphatic ROS: negative for - swollen lymph nodes  Endocrine ROS: negative for - polydipsia/polyuria or temperature intolerance  Respiratory ROS: negative for - hemoptysis, sputum changes or wheezing  Cardiovascular ROS: negative for - chest pain or palpitations  Gastrointestinal ROS: no abdominal pain, change in bowel habits, or black or bloody stools  Dermatological ROS: negative for mole changes and rash  Musculoskeletal ROS: negative for - joint swelling or muscle pain  Neurological ROS: no TIA or stroke symptoms  Genito-Urinary ROS: no dysuria, trouble voiding, or hematuria        PHYSICAL EXAM:  Vitals:    03/06/17 1305   BP: (!) 106/54   Pulse: 61   Temp: 97.8 °F (36.6 °C)     Weight: 77.7 kg (171 lb 6.5 oz)   Height: 5' 8" (172.7 cm)   Body mass index is 26.06 kg/(m^2).    Physical Examination: General appearance - alert, well appearing, and in no distress, overweight and pleasant elderly male - walked with a cane; O2 via BNC in place  Mental status - alert, oriented to person, place, and time, normal behavior, speech, dress, motor activity, and thought processes  Eyes - sclera anicteric  Mouth - mucous membranes moist, pharynx normal without lesions  Neck - supple, no significant " adenopathy, carotids upstroke normal bilaterally, no bruits, thyroid exam: thyroid is normal in size without nodules or tenderness  Lymphatics - no palpable lymphadenopathy  Chest - clear to auscultation, no wheezes, rales or rhonchi, symmetric air entry  Heart - normal rate and regular rhythm, no gallops noted; lifevest in place  Abdomen - soft, nontender, nondistended, no masses or organomegaly   Male - not examined  Back exam - limited range of motion  Neurological - alert, oriented, normal speech, no focal findings or movement disorder noted, cranial nerves II through XII intact  Musculoskeletal - no muscular tenderness noted, Mild-Moderate osteoarthritic changes noted to both knee joints. No joint effusions noted.   Extremities - pedal edema trace +  Skin - normal coloration and turgor, no rashes, no suspicious skin lesions noted; senile purpura noted over both upper extremities.      ASSESSMENT AND PLAN:  1. Routine medical exam  Counseled on age appropriate medical preventative services including age appropriate cancer screenings, age appropriate eye and dental exams, over all nutritional health, need for a consistent exercise regimen, and an over all push towards maintaining a vigorous and active lifestyle.  Counseled on age appropriate vaccines and discussed upcoming health care needs based on age/gender. Discussed good sleep hygiene and stress management.     2. 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 Type 2 diabetes, uncontrolled, with peripheral circulatory disorder/4. 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  Continue current regimen.  Followed by endocrinology.  He is up-to-date on his eye exam.  No claudication symptoms at this time.    5. Paroxysmal atrial fibrillation/6.  Chronic stable angina  The patient reports that he is feeling okay at this time.  He is currently wearing a LifeVest.  She has follow-up with cardiology in the near future.    7. Hyperlipidemia LDL goal <100  We discussed low fat diet and regular exercise.The current medical regimen is effective;  continue present plan and medications.      8. Chronic obstructive pulmonary disease, unspecified COPD type  Stable. Medication as needed.     9. Obstructive sleep apnea on CPAP  We discussed the potential ramifications of untreated sleep apnea, which could include daytime sleepiness, hypertension, heart disease including CHF, sudden death while sleeping and/or stroke. The patient was advised to abstain from driving should they feel sleepy or drowsy.  We discussed potential treatment options, which could include weight loss, body positioning, continuous positive airway pressure (CPAP), or referral for surgical consideration.      10. Atherosclerosis of abdominal aorta  Patient with Atherosclerosis of the Aorta.  Stable/asymptomatic. Currently stable on lipid lowering medication and b/p monitoring.     11. Major depressive disorder, recurrent episode, mild  The current medical regimen is effective;  continue present plan and medications.     12. Weakness  We discussed regular strengthening exercises at home.  He is requesting a shower chair for home use.  - BATH/SHOWER CHAIR FOR HOME USE    13. Senile purpura  Stable. Asymptomatic. Observe.     14. Overweight (BMI 25.0-29.9)  The patient is asked to make an attempt to improve diet and exercise patterns to aid in medical management of this problem.           Return in about 6 months (around 9/6/2017), or if symptoms worsen or fail to improve, for follow up chronic medical conditions.. or sooner as needed.

## 2017-03-13 ENCOUNTER — TELEPHONE (OUTPATIENT)
Dept: FAMILY MEDICINE | Facility: CLINIC | Age: 77
End: 2017-03-13

## 2017-03-13 DIAGNOSIS — G47.33 OBSTRUCTIVE SLEEP APNEA ON CPAP: Primary | ICD-10-CM

## 2017-03-13 NOTE — TELEPHONE ENCOUNTER
----- Message from Stefani Gupta sent at 3/13/2017  1:03 PM CDT -----  Contact: Wendy hernandez PH, PH is requesting a script faxed over for cpap supplies. Please fax to [phone]. Please call Wendy to confirm @ [phone]        Thanks

## 2017-03-14 ENCOUNTER — TELEPHONE (OUTPATIENT)
Dept: FAMILY MEDICINE | Facility: CLINIC | Age: 77
End: 2017-03-14

## 2017-03-14 DIAGNOSIS — G47.33 OBSTRUCTIVE SLEEP APNEA ON CPAP: Primary | ICD-10-CM

## 2017-03-14 NOTE — TELEPHONE ENCOUNTER
----- Message from Sapna Batista sent at 3/14/2017  9:47 AM CDT -----  Contact: self  Pt called concerning a sleep study. Please advise [phone]

## 2017-03-14 NOTE — TELEPHONE ENCOUNTER
Spoke w/patient, requesting a sleep study. States he was told by supplier of CPAP supplies that he need to have another seep study due to present one 10 years old and cannot get CPAP supplies due to sleep study being over 10years. Patient also requesting to have labs done. Please advise.

## 2017-03-23 ENCOUNTER — TELEPHONE (OUTPATIENT)
Dept: FAMILY MEDICINE | Facility: CLINIC | Age: 77
End: 2017-03-23

## 2017-03-23 DIAGNOSIS — G47.33 OBSTRUCTIVE SLEEP APNEA ON CPAP: Primary | ICD-10-CM

## 2017-03-23 NOTE — TELEPHONE ENCOUNTER
I have insurance listed as [facility], not [facility]

## 2017-03-23 NOTE — TELEPHONE ENCOUNTER
----- Message from Vitaconrad Aquino sent at 3/23/2017 10:09 AM CDT -----  Contact: [facility]   Request an order for a new CPAP machine.  Fax [phone].   Thanks!

## 2017-03-28 ENCOUNTER — TELEPHONE (OUTPATIENT)
Dept: FAMILY MEDICINE | Facility: CLINIC | Age: 77
End: 2017-03-28

## 2017-03-28 NOTE — TELEPHONE ENCOUNTER
----- Message from Una Cuevas sent at 3/28/2017  8:29 AM CDT -----  Contact: [facility] ([place])  Pt is having another UTI. Pt needs Medication sent to local pharmacy. Please  Call [phone]. Thanks

## 2017-03-29 NOTE — TELEPHONE ENCOUNTER
Spoke w/patient, requesting antibiotics for UTI. States he is experiencing frequent urination and mild burning upon urination x2 days. States no fever/chills. declined appointment, informed of walk-in clinic and hours, states he will try Azo that he purchased and if it does not resolve he will proceed to walk-in clinic.

## 2017-04-04 ENCOUNTER — TELEPHONE (OUTPATIENT)
Dept: FAMILY MEDICINE | Facility: CLINIC | Age: 77
End: 2017-04-04

## 2017-04-04 NOTE — TELEPHONE ENCOUNTER
----- Message from Yrn Villalobos sent at 4/4/2017 10:21 AM CDT -----  Contact: Brittany/Gabe /[phone]  Brittany states that they are discharging the patient from  today. Thank you.

## 2017-04-05 ENCOUNTER — TELEPHONE (OUTPATIENT)
Dept: FAMILY MEDICINE | Facility: CLINIC | Age: 77
End: 2017-04-05

## 2017-04-05 NOTE — TELEPHONE ENCOUNTER
Please call patient: I received the results of the CPAP sleep study/titration study from . The patient is diagnosed with ELINA. Please set up patient with machine as per recommendations in report. Patient will need office visit in 60-90 days for recheck.

## 2017-04-06 NOTE — TELEPHONE ENCOUNTER
Spoke w/patient, informed of CPAP results and recommendation. Patient states he has a CPAP machine with new supplies that was just service through [facility].  Spoke to/Darcy with [facility]. States patient's CPAP setting can be changed to new Sleep study setting with signed order. Rx in box for signature.

## 2017-04-10 DIAGNOSIS — E11.42 DIABETIC POLYNEUROPATHY ASSOCIATED WITH TYPE 2 DIABETES MELLITUS: ICD-10-CM

## 2017-04-10 DIAGNOSIS — M17.0 PRIMARY OSTEOARTHRITIS OF BOTH KNEES: ICD-10-CM

## 2017-04-10 DIAGNOSIS — G89.29 CHRONIC BILATERAL LOW BACK PAIN WITHOUT SCIATICA: ICD-10-CM

## 2017-04-10 DIAGNOSIS — M54.50 CHRONIC BILATERAL LOW BACK PAIN WITHOUT SCIATICA: ICD-10-CM

## 2017-04-10 RX ORDER — DULOXETIN HYDROCHLORIDE 30 MG/1
60 CAPSULE, DELAYED RELEASE ORAL DAILY
Qty: 90 CAPSULE | Refills: 1
Start: 2017-04-10 | End: 2017-04-10 | Stop reason: SDUPTHER

## 2017-04-10 RX ORDER — DULOXETIN HYDROCHLORIDE 30 MG/1
60 CAPSULE, DELAYED RELEASE ORAL DAILY
Qty: 90 CAPSULE | Refills: 1
Start: 2017-04-10 | End: 2017-04-24 | Stop reason: SDUPTHER

## 2017-04-10 NOTE — TELEPHONE ENCOUNTER
Refill request. Patient is requesting that oxygen @ 2L be discontinued. States he use it with his CPAP at night and during the day if he is doing lots of moving around and experience SOB. Please advise..

## 2017-04-10 NOTE — TELEPHONE ENCOUNTER
----- Message from Yrn Villalobos sent at 4/10/2017  1:24 PM CDT -----  Contact: Self/[phone]  Refill:  duloxetine (CYMBALTA) 30 MG capsule    Patient would also like to speak to the staff regarding his Oxygen. He would like to know what he needs to do to get rid of his Oxygen. Thank you.

## 2017-04-10 NOTE — TELEPHONE ENCOUNTER
We can only stop O2 if his levels are checked off and on oxygen with and without exertion in the clinic and he has an overnight study off oxygen that shows he no longer needs it.

## 2017-04-12 DIAGNOSIS — F33.0 MAJOR DEPRESSIVE DISORDER, RECURRENT EPISODE, MILD: Primary | ICD-10-CM

## 2017-04-12 RX ORDER — DULOXETIN HYDROCHLORIDE 30 MG/1
30 CAPSULE, DELAYED RELEASE ORAL DAILY
Qty: 30 CAPSULE | Refills: 10 | Status: SHIPPED | OUTPATIENT
Start: 2017-04-12 | End: 2018-04-17 | Stop reason: SDUPTHER

## 2017-04-12 NOTE — TELEPHONE ENCOUNTER
----- Message from Ricky Mesa sent at 4/12/2017  3:59 PM CDT -----  Contact: Self  Pt states pharmacy never received duloxetine (CYMBALTA) 30 MG capsule Rx. Pt states Rx needs to specifically state duloxetine in order for ins to cover. Pt can be reached @ [phone].

## 2017-04-13 ENCOUNTER — TELEPHONE (OUTPATIENT)
Dept: FAMILY MEDICINE | Facility: CLINIC | Age: 77
End: 2017-04-13

## 2017-04-20 ENCOUNTER — OFFICE VISIT (OUTPATIENT)
Dept: FAMILY MEDICINE | Facility: CLINIC | Age: 77
End: 2017-04-20
Payer: MEDICARE

## 2017-04-20 VITALS
DIASTOLIC BLOOD PRESSURE: 68 MMHG | HEART RATE: 67 BPM | OXYGEN SATURATION: 95 % | TEMPERATURE: 98 F | BODY MASS INDEX: 25.57 KG/M2 | WEIGHT: 168.75 LBS | SYSTOLIC BLOOD PRESSURE: 110 MMHG | HEIGHT: 68 IN

## 2017-04-20 DIAGNOSIS — J44.9 CHRONIC OBSTRUCTIVE PULMONARY DISEASE, UNSPECIFIED COPD TYPE: ICD-10-CM
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
## 2017-04-20 DIAGNOSIS — I20.89 CHRONIC STABLE ANGINA: ICD-10-CM

## 2017-04-20 DIAGNOSIS — D69.2 SENILE PURPURA: ICD-10-CM

## 2017-04-20 DIAGNOSIS — I70.0 ATHEROSCLEROSIS OF ABDOMINAL AORTA: ICD-10-CM

## 2017-04-20 DIAGNOSIS — E11.65 UNCONTROLLED TYPE 2 DIABETES MELLITUS WITH RETINOPATHY, WITH LONG-TERM CURRENT USE OF INSULIN, MACULAR EDEMA PRESENCE UNSPECIFIED, UNSPECIFIED LATERALITY, UNSPECIFIED RETINOPATHY SEVERITY: ICD-10-CM

## 2017-04-20 DIAGNOSIS — E11.319 UNCONTROLLED TYPE 2 DIABETES MELLITUS WITH RETINOPATHY, WITH LONG-TERM CURRENT USE OF INSULIN, MACULAR EDEMA PRESENCE UNSPECIFIED, UNSPECIFIED LATERALITY, UNSPECIFIED RETINOPATHY SEVERITY: ICD-10-CM

## 2017-04-20 DIAGNOSIS — Z79.4 UNCONTROLLED TYPE 2 DIABETES MELLITUS WITH RETINOPATHY, WITH LONG-TERM CURRENT USE OF INSULIN, MACULAR EDEMA PRESENCE UNSPECIFIED, UNSPECIFIED LATERALITY, UNSPECIFIED RETINOPATHY SEVERITY: ICD-10-CM

## 2017-04-20 DIAGNOSIS — F33.0 MAJOR DEPRESSIVE DISORDER, RECURRENT EPISODE, MILD: ICD-10-CM

## 2017-04-20 DIAGNOSIS — I77.9 BILATERAL CAROTID ARTERY DISEASE: ICD-10-CM

## 2017-04-20 DIAGNOSIS — I48.0 PAROXYSMAL ATRIAL FIBRILLATION: ICD-10-CM

## 2017-04-20 DIAGNOSIS — Z79.4 INSULIN LONG-TERM USE: ICD-10-CM

## 2017-04-20 PROCEDURE — 99214 OFFICE O/P EST MOD 30 MIN: CPT | Mod: S$GLB,,, | Performed by: NURSE PRACTITIONER

## 2017-04-20 PROCEDURE — 99999 PR PBB SHADOW E&M-EST. PATIENT-LVL III: CPT | Mod: PBBFAC,,, | Performed by: NURSE PRACTITIONER

## 2017-04-20 PROCEDURE — 3078F DIAST BP <80 MM HG: CPT | Mod: S$GLB,,, | Performed by: NURSE PRACTITIONER

## 2017-04-20 PROCEDURE — 3074F SYST BP LT 130 MM HG: CPT | Mod: S$GLB,,, | Performed by: NURSE PRACTITIONER

## 2017-04-20 PROCEDURE — 99499 UNLISTED E&M SERVICE: CPT | Mod: S$PBB,,, | Performed by: NURSE PRACTITIONER

## 2017-04-20 PROCEDURE — 1126F AMNT PAIN NOTED NONE PRSNT: CPT | Mod: S$GLB,,, | Performed by: NURSE PRACTITIONER

## 2017-04-20 PROCEDURE — 3060F POS MICROALBUMINURIA REV: CPT | Mod: 8P,S$GLB,, | Performed by: NURSE PRACTITIONER

## 2017-04-20 PROCEDURE — 1159F MED LIST DOCD IN RCRD: CPT | Mod: S$GLB,,, | Performed by: NURSE PRACTITIONER

## 2017-04-20 PROCEDURE — 3045F PR MOST RECENT HEMOGLOBIN A1C LEVEL 7.0-9.0%: CPT | Mod: S$GLB,,, | Performed by: NURSE PRACTITIONER

## 2017-04-20 PROCEDURE — 1160F RVW MEDS BY RX/DR IN RCRD: CPT | Mod: S$GLB,,, | Performed by: NURSE PRACTITIONER

## 2017-04-20 RX ORDER — AMOXICILLIN AND CLAVULANATE POTASSIUM 875; 125 MG/1; MG/1
1 TABLET, FILM COATED ORAL 2 TIMES DAILY
Qty: 20 TABLET | Refills: 0 | Status: SHIPPED | OUTPATIENT
Start: 2017-04-20 | End: 2017-04-30

## 2017-04-20 RX ORDER — MUPIROCIN 20 MG/G
OINTMENT TOPICAL 2 TIMES DAILY
Qty: 15 G | Refills: 1 | Status: SHIPPED | OUTPATIENT
Start: 2017-04-20 | End: 2017-04-30

## 2017-04-20 NOTE — MR AVS SNAPSHOT
[facility]  [address]  [place] [address]  Phone: [phone]  Fax: [phone]                  Percy Ramirez   2017 2:40 PM   Office Visit    Description:  Male : 1940   Provider:  FELISA Donaldson   Department:  [facility]           Reason for Visit     Abrasion           Diagnoses this Visit        Comments    Cat bite, initial encounter    -  Primary     Atherosclerosis of abdominal aorta         Chronic obstructive pulmonary disease, unspecified COPD type         Insulin long-term use         Major depressive disorder, recurrent episode, mild         Paroxysmal atrial fibrillation         Chronic stable angina         Bilateral carotid artery disease         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 [phone]    2017 10:00 AM Kasie Edmondson MD [facility] [phone]      Goals (5 Years of Data)              10/22/13    HEMOGLOBIN A1C < 7.5   7.4       These Medications        Disp Refills Start End    amoxicillin-clavulanate 875-125mg (AUGMENTIN) 875-125 mg per tablet 20 tablet 0 2017    Take 1 tablet by mouth 2 (two) times daily. - Oral    Pharmacy: [facility] [address] [place] [address] ([facility] [address] [place] [address] Ph #: [phone]       mupirocin (BACTROBAN) 2 % ointment 15 g 1 2017    Apply topically 2 (two) times daily. - Topical (Top)    Pharmacy: [facility] [address] [place] [address], [place] - [address] [facility] Ph #: [phone]         [facility]     [facility] Nurse Care Line - 24/7 Assistance  Unless otherwise directed by your provider, please contact [facility], our nurse care line that is available for 24/7 assistance.     Registered nurses in the [facility] provide: appointment scheduling, clinical advisement, health education, and other advisory services.  Call: [phone] (toll free)               Medications           Message regarding Medications     Verify the changes and/or additions to your medication regime listed below are the same as discussed with your clinician today.  If any of these changes or additions are incorrect, please notify your healthcare provider.        START taking these NEW medications        Refills    amoxicillin-clavulanate 875-125mg (AUGMENTIN) 875-125 mg per tablet 0    Sig: Take 1 tablet by mouth 2 (two) times daily.    Class: Normal    Route: Oral    mupirocin (BACTROBAN) 2 % ointment 1    Sig: Apply topically 2 (two) times daily.    Class: Normal    Route: Topical (Top)           Verify that the below list of medications is an accurate representation of the medications you are currently taking.  If none reported, the list may be blank. If incorrect, please contact your healthcare provider. Carry this list with you in case of emergency.           Current Medications     aspirin (ECOTRIN) 81 MG EC tablet Take 81 mg by mouth. 1 Tablet, Delayed Release (E.C.) Oral Every day    BD INSULIN PEN NEEDLE UF SHORT 31 gauge x 5/16" Ndle     BD INSULIN SYRINGE ULTRA-FINE 1/2 mL 31 gauge x 15/64" Syrg     carvedilol (COREG) 25 MG tablet Take 25 mg by mouth. 1 Tablet Oral Twice a day     cinnamon bark 500 mg capsule Take 1,000 mg by mouth once daily.      CIPRODEX 0.3-0.1 % DrpS PLACE 3 DROP(S) IN AFFECTED EAR(S) 3 TIMES A DAY    clopidogrel (PLAVIX) 75 mg tablet Take 75 mg by mouth once daily.    clotrimazole-betamethasone 1-0.05% (LOTRISONE) cream     diazepam (VALIUM) 2 MG tablet Take 2 mg by mouth continuous prn.    " "duloxetine (CYMBALTA) 30 MG capsule Take 2 capsules (60 mg total) by mouth once daily. In 1-2 weeks, if no relief, may increase dose to two capsules once daily.    duloxetine (CYMBALTA) 30 MG capsule Take 1 capsule (30 mg total) by mouth once daily.    furosemide (LASIX) 40 MG tablet     gabapentin (NEURONTIN) 300 MG capsule TAKE 2 CAPSULES BY MOUTH 3 TIMES DAILY    glimepiride (AMARYL) 4 MG tablet Take 8 mg by mouth daily with breakfast.     insulin NPH-insulin regular, 70/30, (NOVOLIN 70/30) 100 unit/mL (70-30) injection Inject into the skin. Inject 5 units at breakfast and inject 10 units at night    insulin syringe-needle U-100 1/2 mL 31 x 5/16" Syrg     ketoconazole (NIZORAL) 2 % cream APPLY TO AFFECTED AREAS ON TOENAILS TWICE DAILY    losartan (COZAAR) 100 MG tablet Take 1 tablet (100 mg total) by mouth once daily.    metformin (GLUCOPHAGE) 500 MG tablet Take 1,000 mg by mouth 2 (two) times daily with meals. 2 Tablets in the morning, 2 Tablets in the evening    nitroGLYCERIN (NITROSTAT) 0.3 MG SL tablet PLACE 1 TABLET UNDER TONGUE AS NEEDED FOR CHEST PAIN EVERY 5 MINUTES, UP TO 3 DOSES IN 15MINUTES    OM-3/DHA/EPA/FISH OIL/VIT D3 (FISH OIL-VIT D3 ORAL) Take 2,000 Units by mouth once daily.     pravastatin (PRAVACHOL) 20 MG tablet Take 20 mg by mouth once daily.    spironolactone (ALDACTONE) 25 MG tablet 25 mg once daily.     VIT C/VIT E AC/LUT/COPPER/ZINC (PRESERVISION LUTEIN ORAL) Take by mouth.    warfarin (COUMADIN) 5 MG tablet     albuterol 90 mcg/actuation inhaler Inhale 2 puffs into the lungs every 6 (six) hours as needed for Wheezing or Shortness of Breath.    amoxicillin-clavulanate 875-125mg (AUGMENTIN) 875-125 mg per tablet Take 1 tablet by mouth 2 (two) times daily.    mupirocin (BACTROBAN) 2 % ointment Apply topically 2 (two) times daily.           Clinical Reference Information           Your Vitals Were     BP Pulse Temp Height Weight SpO2    110/68 (BP Location: Left arm, Patient Position: " "Sitting, BP Method: Manual) 67 97.6 °F (36.4 °C) (Oral) 5' 8" (1.727 m) 76.5 kg (168 lb 12.2 oz) 95%    BMI                25.66 kg/m2          Blood Pressure          Most Recent Value    BP  110/68      Allergies as of 4/20/2017     Codeine      Immunizations Administered on Date of Encounter - 4/20/2017     None      Language Assistance Services     ATTENTION: Language assistance services are available, free of charge. Please call [phone].      ATENCIÓN: Si habla español, tiene a huerta disposición servicios gratuitos de asistencia lingüística. Llame al [phone].     CHÚ Ý: N?u b?n nói Ti?ng Vi?t, có các d?ch v? h? tr? ngôn ng? mi?n phí dành cho b?n. G?i s? [phone].         [facility] complies with applicable Federal civil rights laws and does not discriminate on the basis of race, color, national origin, age, disability, or sex.        "

## 2017-04-20 NOTE — PROGRESS NOTES
Subjective:       Patient ID: Percy Ramirez is a 76 y.o. male.    Chief Complaint: Abrasion (Patient Cat)    HPI Comments:  76-year-old male presents to the clinic today due to cat scratches to his right forearm that had a small amount of bleeding noted at this time.  However, he does have a pressure dressing applied to it.  He is up-to-date on tetanus.  He is on Coumadin for atrial fibrillation.  He is compliant with all of his medications.  He states his blood sugar this morning was 163 and run in the 160's mostly.   He has poor dietary habits.  He rides a stationary bike sometimes.  He's been wearing a Velcro brace to his left foot because he's been having problems with his toes.  He has small amount of redness to his left great toe and left fourth toe.  He has a large amount of fungus noted on the left great toe.  He sees Dr. Campbell regular basis.  He denies any chest pain, heart palpitations, shortness of breath, or swelling to lower extremities.  He denies any headaches, dizziness, or blurred vision.     Past Medical History:   Diagnosis Date    Allergy     Arthritis     CAD, multiple vessel     DR Melissa    Cataract     Depression     Hyperlipidemia     Hypertension     Macular degeneration     Dr Razo for injection, Jennifer for eye    S/P CABG x 2     Type 2 diabetes mellitus with circulatory disorder     Dr. Aquino     Past Surgical History:   Procedure Laterality Date    broken elbow      left    EYE SURGERY      cataract    heart bypass      2004    HERNIA REPAIR      right    ROTATOR CUFF REPAIR      right  2004      reports that he quit smoking about 13 years ago. His smoking use included Cigarettes. He has a 135.00 pack-year smoking history. He has never used smokeless tobacco. He reports that he drinks alcohol. He reports that he does not use illicit drugs.  Review of Systems   Constitutional: Negative for chills and fever.   Respiratory: Negative for cough, shortness of breath and  wheezing.    Cardiovascular: Negative for chest pain, palpitations and leg swelling.   Gastrointestinal: Negative for abdominal pain, diarrhea, nausea and vomiting.   Musculoskeletal: Negative for gait problem.   Skin: Positive for wound.   Neurological: Negative for dizziness, light-headedness and headaches.       Objective:      Physical Exam   Constitutional: He is oriented to person, place, and time. He appears well-developed and well-nourished. No distress.   Eyes: Conjunctivae and EOM are normal. Pupils are equal, round, and reactive to light. Right eye exhibits no discharge. Left eye exhibits no discharge. No scleral icterus.   Cardiovascular: Normal rate, regular rhythm and normal heart sounds.  Exam reveals no gallop and no friction rub.    No murmur heard.  Pulmonary/Chest: Effort normal and breath sounds normal. No respiratory distress. He has no wheezes. He has no rales.   Abdominal: Soft. Bowel sounds are normal. There is no tenderness.   Musculoskeletal: Normal range of motion. He exhibits no edema.   Left great toe nail thick fungus noted small amount of redness noted around nail left fourth toe small amount of redness noted around nail   Neurological: He is alert and oriented to person, place, and time.   Skin: He is not diaphoretic.   Right inner forearm 2 puncture  wounds noted with a small amount of bleeding noted    Psychiatric: He has a normal mood and affect.       Assessment:       1. Cat bite, initial encounter    2. Atherosclerosis of abdominal aorta    3. Chronic obstructive pulmonary disease, unspecified COPD type    4. Insulin long-term use    5. Major depressive disorder, recurrent episode, mild    6. Paroxysmal atrial fibrillation    7. Chronic stable angina    8. Bilateral carotid artery disease    9. 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 Senile purpura        Plan:           Cat bite, initial encounter  - clear bid and apply bactroban  And pressure dressing    Atherosclerosis of abdominal aorta  -Stable / Asymptomatic is on blood pressure and cholesterol lowering medications    Chronic obstructive pulmonary disease, unspecified COPD type  - The current medical regimen is effective;  continue present plan and medications.    Insulin long-term use  - no hypoglycemia     Major depressive disorder, recurrent episode, mild  - The current medical regimen is effective;  continue present plan and medications.    Paroxysmal atrial fibrillation  -The current medical regimen is effective;  continue present plan and medications.    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    Senile purpura  - worse due to cat bites   Other orders  -     amoxicillin-clavulanate 875-125mg (AUGMENTIN) 875-125 mg per tablet; Take 1 tablet by mouth 2 (two) times daily.  Dispense: 20 tablet; Refill: 0  -     mupirocin (BACTROBAN) 2 % ointment; Apply topically 2 (two) times daily.  Dispense: 15 g; Refill: 1      Cleaned small cat bites and dressed with antibiotic ointment and pressure dressing applied     Stressed the importance of call cardiology 's office and let them know you are taking antibiotics

## 2017-04-24 ENCOUNTER — HOSPITAL ENCOUNTER (OUTPATIENT)
Dept: RADIOLOGY | Facility: HOSPITAL | Age: 77
Discharge: HOME | End: 2017-04-24
Attending: NURSE PRACTITIONER
Payer: MEDICARE

## 2017-04-24 ENCOUNTER — OFFICE VISIT (OUTPATIENT)
Dept: FAMILY MEDICINE | Facility: CLINIC | Age: 77
End: 2017-04-24
Payer: MEDICARE

## 2017-04-24 VITALS
TEMPERATURE: 98 F | HEIGHT: 68 IN | BODY MASS INDEX: 25.98 KG/M2 | HEART RATE: 80 BPM | OXYGEN SATURATION: 96 % | DIASTOLIC BLOOD PRESSURE: 60 MMHG | SYSTOLIC BLOOD PRESSURE: 126 MMHG | WEIGHT: 171.44 LBS

## 2017-04-24 DIAGNOSIS — M79.89 PAIN AND SWELLING OF TOE OF LEFT FOOT: ICD-10-CM

## 2017-04-24 DIAGNOSIS — M79.675 TOE PAIN, LEFT: Primary | ICD-10-CM

## 2017-04-24 DIAGNOSIS — M79.675 PAIN AND SWELLING OF TOE OF LEFT FOOT: ICD-10-CM

## 2017-04-24 DIAGNOSIS — M79.675 TOE PAIN, LEFT: ICD-10-CM

## 2017-04-24 PROCEDURE — 73630 X-RAY EXAM OF FOOT: CPT | Mod: TC,PO,LT

## 2017-04-24 PROCEDURE — 3074F SYST BP LT 130 MM HG: CPT | Mod: S$GLB,,, | Performed by: NURSE PRACTITIONER

## 2017-04-24 PROCEDURE — 99214 OFFICE O/P EST MOD 30 MIN: CPT | Mod: S$GLB,,, | Performed by: NURSE PRACTITIONER

## 2017-04-24 PROCEDURE — 73630 X-RAY EXAM OF FOOT: CPT | Mod: 26,LT,, | Performed by: RADIOLOGY

## 2017-04-24 PROCEDURE — 1125F AMNT PAIN NOTED PAIN PRSNT: CPT | Mod: S$GLB,,, | Performed by: NURSE PRACTITIONER

## 2017-04-24 PROCEDURE — 1160F RVW MEDS BY RX/DR IN RCRD: CPT | Mod: S$GLB,,, | Performed by: NURSE PRACTITIONER

## 2017-04-24 PROCEDURE — 3078F DIAST BP <80 MM HG: CPT | Mod: S$GLB,,, | Performed by: NURSE PRACTITIONER

## 2017-04-24 PROCEDURE — 1159F MED LIST DOCD IN RCRD: CPT | Mod: S$GLB,,, | Performed by: NURSE PRACTITIONER

## 2017-04-24 PROCEDURE — 99999 PR PBB SHADOW E&M-EST. PATIENT-LVL V: CPT | Mod: PBBFAC,,, | Performed by: NURSE PRACTITIONER

## 2017-04-24 RX ORDER — TRAMADOL HYDROCHLORIDE 50 MG/1
50 TABLET ORAL EVERY 6 HOURS PRN
Qty: 30 TABLET | Refills: 0 | Status: SHIPPED | OUTPATIENT
Start: 2017-04-24 | End: 2017-04-24 | Stop reason: SDUPTHER

## 2017-04-24 RX ORDER — TRAMADOL HYDROCHLORIDE 50 MG/1
50 TABLET ORAL EVERY 6 HOURS PRN
Qty: 30 TABLET | Refills: 0 | Status: SHIPPED | OUTPATIENT
Start: 2017-04-24 | End: 2017-04-24

## 2017-04-24 RX ORDER — HYDROCODONE BITARTRATE AND ACETAMINOPHEN 5; 325 MG/1; MG/1
1 TABLET ORAL EVERY 6 HOURS PRN
Qty: 30 TABLET | Refills: 0 | Status: SHIPPED | OUTPATIENT
Start: 2017-04-24 | End: 2017-11-15 | Stop reason: ALTCHOICE

## 2017-04-24 NOTE — MR AVS SNAPSHOT
[facility]  [address]  [place] [address]  Phone: [phone]  Fax: [phone]                  Percy Ramirez   2017 9:20 AM   Office Visit    Description:  Male : 1940   Provider:  FELISA Maria   Department:  [facility]           Reason for Visit     Toe Pain           Diagnoses this Visit        Comments    Toe pain, left    -  Primary     Pain and swelling of toe of left foot                To Do List           Future Appointments        Provider Department Dept Phone    2017 11:00 AM Marifer Romero DPM [facility] - Podiatry [phone]    2017 10:00 AM Kasie Edmondson MD [facility] [phone]      Goals (5 Years of Data)              10/22/13    HEMOGLOBIN A1C < 7.5   7.4      Follow-Up and Disposition     Return if symptoms worsen or fail to improve.    Follow-up and Disposition History       These Medications        Disp Refills Start End    tramadol (ULTRAM) 50 mg tablet 30 tablet 0 2017    Take 1 tablet (50 mg total) by mouth every 6 (six) hours as needed for Pain. - Oral    Pharmacy: [facility] [address] [place], [place] [address]RO [facility] [address] Ph #: [phone]         [facility]     [facility] Care Line - 24/ Assistance  Unless otherwise directed by your provider, please contact [facility], our nurse care line that is available for 24/ assistance.     Registered nurses in the [facility] provide: appointment scheduling, clinical advisement, health education, and other advisory services.  Call: [phone] (toll free)               Medications           Message regarding Medications     Verify the changes and/or additions to your medication regime listed below are the same as discussed with your clinician today.  If any of these changes or additions are incorrect, please notify your healthcare provider.        START taking these NEW  "medications        Refills    tramadol (ULTRAM) 50 mg tablet 0    Sig: Take 1 tablet (50 mg total) by mouth every 6 (six) hours as needed for Pain.    Class: Normal    Route: Oral           Verify that the below list of medications is an accurate representation of the medications you are currently taking.  If none reported, the list may be blank. If incorrect, please contact your healthcare provider. Carry this list with you in case of emergency.           Current Medications     albuterol 90 mcg/actuation inhaler Inhale 2 puffs into the lungs every 6 (six) hours as needed for Wheezing or Shortness of Breath.    amoxicillin-clavulanate 875-125mg (AUGMENTIN) 875-125 mg per tablet Take 1 tablet by mouth 2 (two) times daily.    aspirin (ECOTRIN) 81 MG EC tablet Take 81 mg by mouth. 1 Tablet, Delayed Release (E.C.) Oral Every day    BD INSULIN PEN NEEDLE UF SHORT 31 gauge x 5/16" Ndle     BD INSULIN SYRINGE ULTRA-FINE 1/2 mL 31 gauge x 15/64" Syrg     carvedilol (COREG) 25 MG tablet Take 25 mg by mouth. 1 Tablet Oral Twice a day     cinnamon bark 500 mg capsule Take 1,000 mg by mouth once daily.      CIPRODEX 0.3-0.1 % DrpS PLACE 3 DROP(S) IN AFFECTED EAR(S) 3 TIMES A DAY    clopidogrel (PLAVIX) 75 mg tablet Take 75 mg by mouth once daily.    clotrimazole-betamethasone 1-0.05% (LOTRISONE) cream     diazepam (VALIUM) 2 MG tablet Take 2 mg by mouth continuous prn.    duloxetine (CYMBALTA) 30 MG capsule Take 1 capsule (30 mg total) by mouth once daily.    furosemide (LASIX) 40 MG tablet     gabapentin (NEURONTIN) 300 MG capsule TAKE 2 CAPSULES BY MOUTH 3 TIMES DAILY    glimepiride (AMARYL) 4 MG tablet Take 8 mg by mouth daily with breakfast.     insulin NPH-insulin regular, 70/30, (NOVOLIN 70/30) 100 unit/mL (70-30) injection Inject into the skin. Inject 5 units at breakfast and inject 10 units at night    insulin syringe-needle U-100 1/2 mL 31 x 5/16" Syrg     ketoconazole (NIZORAL) 2 % cream APPLY TO AFFECTED AREAS ON " "TOENAILS TWICE DAILY    losartan (COZAAR) 100 MG tablet Take 1 tablet (100 mg total) by mouth once daily.    metformin (GLUCOPHAGE) 500 MG tablet Take 1,000 mg by mouth 2 (two) times daily with meals. 2 Tablets in the morning, 2 Tablets in the evening    mupirocin (BACTROBAN) 2 % ointment Apply topically 2 (two) times daily.    nitroGLYCERIN (NITROSTAT) 0.3 MG SL tablet PLACE 1 TABLET UNDER TONGUE AS NEEDED FOR CHEST PAIN EVERY 5 MINUTES, UP TO 3 DOSES IN 15MINUTES    OM-3/DHA/EPA/FISH OIL/VIT D3 (FISH OIL-VIT D3 ORAL) Take 2,000 Units by mouth once daily.     pravastatin (PRAVACHOL) 20 MG tablet Take 20 mg by mouth once daily.    spironolactone (ALDACTONE) 25 MG tablet 25 mg once daily.     VIT C/VIT E AC/LUT/COPPER/ZINC (PRESERVISION LUTEIN ORAL) Take by mouth.    warfarin (COUMADIN) 5 MG tablet Take 2 tabs by mouth on Tuesday and on all other days .5 tabs by mouth    tramadol (ULTRAM) 50 mg tablet Take 1 tablet (50 mg total) by mouth every 6 (six) hours as needed for Pain.           Clinical Reference Information           Your Vitals Were     BP Pulse Temp Height Weight SpO2    126/60 (BP Location: Right arm, Patient Position: Sitting, BP Method: Manual) 80 97.8 °F (36.6 °C) (Oral) 5' 8" (1.727 m) 77.7 kg (171 lb 6.5 oz) 96%    BMI                26.06 kg/m2          Blood Pressure          Most Recent Value    BP  126/60      Allergies as of 4/24/2017     Codeine      Immunizations Administered on Date of Encounter - 4/24/2017     None      Orders Placed During Today's Visit     Future Labs/Procedures Expected by Expires    X-Ray Foot Complete Left  4/24/2017 4/24/2018      Instructions      Toe Sprain  A sprain is a stretching or tearing of the ligaments that hold a joint together. There are no broken bones. Sprains generally take from 3-6 weeks to heal. A toe sprain may be treated by taping the injured toe to the next toe. This is called buddy taping. This protects the injured toe and holds it in position. " Mild sprains may not need any additional support. If the toenail has been hurt badly, it may fall off in 1-2 weeks. A new one will usually start to grow back within a month.    Home care  · Keep your leg elevated when sitting or lying down. This is very important during the first 48 hours to reduce swelling. Stay off the injured foot as much as possible until you can walk on it without pain. If needed, you may use crutches during the first week for this purpose. Crutches can be rented at many pharmacies or surgical/orthopedic supply stores.  · You may be given a cast shoe to wear to prevent movement in your toe. If not, you can use a sandal or any shoe that does not put pressure on the injured toe until the swelling and pain go away. If using a sandal, be careful not to hit your foot against anything, since another injury could make the sprain worse.  · Apply an ice pack over the injured area for 15 to 20 minutes every 3 to 6 hours. You should do this for the first 24 to 48 hours. You can make an ice pack by filling a plastic bag that seals at the top with ice cubes and then wrapping it with a thin towel. Continue to use ice packs for relief of pain and swelling as needed. As the ice melts, be careful to avoid getting your wrap, splint, or cast wet. As the ice melts, be careful to avoid getting any wrap, splint, tape, or cast wet. After 48 hours, apply heat from a warm shower or bath for 20 minutes several times daily. Alternating ice and heat may also be helpful.  · If buddy tape was applied and it becomes wet or dirty, change it. You may replace it with paper, plastic or cloth tape. Cloth tape and paper tapes must be kept dry. Apply gauze or cotton padding between the toes, especially near webbed area. This will help prevent the skin from getting moist and breaking down. Keep the buddy tape in place for at least 4 weeks, or as advised by your healthcare provider.  · You may use over-the-counter pain medicine to  control pain, unless another pain medicine was prescribed. If you have chronic liver or kidney disease or ever had a stomach ulcer or GI bleeding, talk with your healthcare provider before using these medicines.  · You may return to sports after healing, when you can run without pain.  Follow-up care  Follow up with your with your healthcare provider as advised. Sometimes fractures dont show up on the first X-ray. Bruises and sprains can sometimes hurt as much as a fracture. These injuries can take time to heal completely. If your symptoms dont improve or they get worse, talk with your healthcare provider. You may need a repeat X-ray. If X-rays were taken, you will be told of any new findings that may affect your care.  When to seek medical advice  Call your healthcare provider right away if any of these occur:  · Redness, warmth, or fluid drainage from your toe  · Pain or swelling increases  · Toes become cold, blue, numb, or tingly  Date Last Reviewed: 11/20/2015  © 2158-5964 [employer]. [address] [place] [address]. All rights reserved. This information is not intended as a substitute for professional medical care. Always follow your healthcare professional's instructions.             Language Assistance Services     ATTENTION: Language assistance services are available, free of charge. Please call [phone].      ATENCIÓN: Si pranay aj, tiene a huerta disposición servicios gratuitos de asistencia lingüística. Llame al [phone].     [facility] Ý: N?u b?n nói Ti?ng Vi?t, có các d?ch v? h? tr? ngôn ng? mi?n phí dành cho b?n. G?i s? [phone].         [facility] complies with applicable Federal civil rights laws and does not discriminate on the basis of race, color, national origin, age, disability, or sex.

## 2017-04-24 NOTE — PROGRESS NOTES
Subjective:       Patient ID: Percy Ramirez is a 76 y.o. male.    Chief Complaint: Toe Pain (left foot)    Toe Pain    The incident occurred more than 1 week ago. The incident occurred at home. The injury mechanism was a direct blow. The pain is present in the left toes. The quality of the pain is described as stabbing and burning. The pain is at a severity of 10/10. The pain is severe. The pain has been fluctuating since onset. Pertinent negatives include no inability to bear weight or numbness. He reports no foreign bodies present. The symptoms are aggravated by movement. He has tried acetaminophen for the symptoms. The treatment provided no relief.       Past Medical History:   Diagnosis Date    Allergy     Arthritis     CAD, multiple vessel     DR Melissa    Cataract     Depression     Hyperlipidemia     Hypertension     Macular degeneration     Dr Razo for injection, Jennifer for eye    S/P CABG x 2     Type 2 diabetes mellitus with circulatory disorder     Dr. Aquino       Social History     Social History    Marital status:      Spouse name: N/A    Number of children: 4    Years of education: GED     Occupational History    retired [employer]       Social History Main Topics    Smoking status: Former Smoker     Packs/day: 3.00     Years: 45.00     Types: Cigarettes     Quit date: 4/20/2004    Smokeless tobacco: Never Used    Alcohol use Yes      Comment: was heavy drinker 35 years ago, rare use now    Drug use: No    Sexual activity: Yes     Partners: Female     Other Topics Concern    Not on file     Social History Narrative       Past Surgical History:   Procedure Laterality Date    broken elbow      left    EYE SURGERY      cataract    heart bypass      2004    HERNIA REPAIR      right    ROTATOR CUFF REPAIR      right  2004       Review of Systems   Musculoskeletal: Positive for gait problem and joint swelling.   Neurological: Negative for weakness and numbness.   All  "other systems reviewed and are negative.      Objective:   /60 (BP Location: Right arm, Patient Position: Sitting, BP Method: Manual)  Pulse 80  Temp 97.8 °F (36.6 °C) (Oral)   Ht 5' 8" (1.727 m)  Wt 77.7 kg (171 lb 6.5 oz)  SpO2 96%  BMI 26.06 kg/m2     Physical Exam   Constitutional: He is oriented to person, place, and time. He appears well-developed and well-nourished. He is cooperative.   HENT:   Head: Normocephalic and atraumatic.   Cardiovascular: Normal rate, regular rhythm, normal heart sounds and normal pulses.    Pulmonary/Chest: Effort normal. No respiratory distress. He has no decreased breath sounds. He has no wheezes. He has no rhonchi. He has no rales.   Musculoskeletal:        Left foot: There is decreased range of motion, tenderness and swelling.   Feet:   Left Foot:   Skin Integrity: Positive for erythema.   Neurological: He is alert and oriented to person, place, and time.   Skin: Skin is warm, dry and intact.   Psychiatric: He has a normal mood and affect. His speech is normal and behavior is normal. Thought content normal. Cognition and memory are normal.       Assessment:       1. Toe pain, left    2. Pain and swelling of toe of left foot        Plan:       Percy was seen today for toe pain.    Diagnoses and all orders for this visit:    Toe pain, left  -     Cancel: X-Ray Toe 2 View; Future  -     X-Ray Foot Complete Left; Future    Pain and swelling of toe of left foot  -     Cancel: X-Ray Toe 2 View; Future  -     X-Ray Foot Complete Left; Future    He is to continue tylenol for pain. Advised not to take NSAIDs, he is on coumadin. He verbalized understanding.   Will follow up once results available. He verbalized understanding.   Return if symptoms worsen or fail to improve.    "

## 2017-04-24 NOTE — PATIENT INSTRUCTIONS
Toe Sprain  A sprain is a stretching or tearing of the ligaments that hold a joint together. There are no broken bones. Sprains generally take from 3-6 weeks to heal. A toe sprain may be treated by taping the injured toe to the next toe. This is called buddy taping. This protects the injured toe and holds it in position. Mild sprains may not need any additional support. If the toenail has been hurt badly, it may fall off in 1-2 weeks. A new one will usually start to grow back within a month.    Home care  · Keep your leg elevated when sitting or lying down. This is very important during the first 48 hours to reduce swelling. Stay off the injured foot as much as possible until you can walk on it without pain. If needed, you may use crutches during the first week for this purpose. Crutches can be rented at many pharmacies or surgical/orthopedic supply stores.  · You may be given a cast shoe to wear to prevent movement in your toe. If not, you can use a sandal or any shoe that does not put pressure on the injured toe until the swelling and pain go away. If using a sandal, be careful not to hit your foot against anything, since another injury could make the sprain worse.  · Apply an ice pack over the injured area for 15 to 20 minutes every 3 to 6 hours. You should do this for the first 24 to 48 hours. You can make an ice pack by filling a plastic bag that seals at the top with ice cubes and then wrapping it with a thin towel. Continue to use ice packs for relief of pain and swelling as needed. As the ice melts, be careful to avoid getting your wrap, splint, or cast wet. As the ice melts, be careful to avoid getting any wrap, splint, tape, or cast wet. After 48 hours, apply heat from a warm shower or bath for 20 minutes several times daily. Alternating ice and heat may also be helpful.  · If buddy tape was applied and it becomes wet or dirty, change it. You may replace it with paper, plastic or cloth tape. Cloth tape  and paper tapes must be kept dry. Apply gauze or cotton padding between the toes, especially near webbed area. This will help prevent the skin from getting moist and breaking down. Keep the jeffrey tape in place for at least 4 weeks, or as advised by your healthcare provider.  · You may use over-the-counter pain medicine to control pain, unless another pain medicine was prescribed. If you have chronic liver or kidney disease or ever had a stomach ulcer or GI bleeding, talk with your healthcare provider before using these medicines.  · You may return to sports after healing, when you can run without pain.  Follow-up care  Follow up with your with your healthcare provider as advised. Sometimes fractures dont show up on the first X-ray. Bruises and sprains can sometimes hurt as much as a fracture. These injuries can take time to heal completely. If your symptoms dont improve or they get worse, talk with your healthcare provider. You may need a repeat X-ray. If X-rays were taken, you will be told of any new findings that may affect your care.  When to seek medical advice  Call your healthcare provider right away if any of these occur:  · Redness, warmth, or fluid drainage from your toe  · Pain or swelling increases  · Toes become cold, blue, numb, or tingly  Date Last Reviewed: 11/20/2015  © 0813-8473 [employer]. [address] [place] [address], [place] [address]. All rights reserved. This information is not intended as a substitute for professional medical care. Always follow your healthcare professional's instructions.

## 2017-05-11 ENCOUNTER — OFFICE VISIT (OUTPATIENT)
Dept: PODIATRY | Facility: CLINIC | Age: 77
End: 2017-05-11
Payer: MEDICARE

## 2017-05-11 VITALS
DIASTOLIC BLOOD PRESSURE: 66 MMHG | BODY MASS INDEX: 25.91 KG/M2 | WEIGHT: 171 LBS | HEIGHT: 68 IN | SYSTOLIC BLOOD PRESSURE: 136 MMHG

## 2017-05-11 DIAGNOSIS — M77.42 METATARSALGIA OF LEFT FOOT: ICD-10-CM

## 2017-05-11 DIAGNOSIS — M20.42 HAMMER TOES OF BOTH FEET: ICD-10-CM

## 2017-05-11 DIAGNOSIS — E11.49 TYPE II DIABETES MELLITUS WITH NEUROLOGICAL MANIFESTATIONS: ICD-10-CM

## 2017-05-11 DIAGNOSIS — M79.672 FOOT PAIN, LEFT: ICD-10-CM

## 2017-05-11 DIAGNOSIS — M20.41 HAMMER TOES OF BOTH FEET: ICD-10-CM

## 2017-05-11 DIAGNOSIS — M20.10 HALLUX ABDUCTO VALGUS, UNSPECIFIED LATERALITY: ICD-10-CM

## 2017-05-11 PROCEDURE — 1160F RVW MEDS BY RX/DR IN RCRD: CPT | Mod: S$GLB,,, | Performed by: PODIATRIST

## 2017-05-11 PROCEDURE — 1125F AMNT PAIN NOTED PAIN PRSNT: CPT | Mod: S$GLB,,, | Performed by: PODIATRIST

## 2017-05-11 PROCEDURE — 3078F DIAST BP <80 MM HG: CPT | Mod: S$GLB,,, | Performed by: PODIATRIST

## 2017-05-11 PROCEDURE — 99499 UNLISTED E&M SERVICE: CPT | Mod: S$PBB,,, | Performed by: PODIATRIST

## 2017-05-11 PROCEDURE — 20600 DRAIN/INJ JOINT/BURSA W/O US: CPT | Mod: LT,S$GLB,, | Performed by: PODIATRIST

## 2017-05-11 PROCEDURE — 3075F SYST BP GE 130 - 139MM HG: CPT | Mod: S$GLB,,, | Performed by: PODIATRIST

## 2017-05-11 PROCEDURE — 99213 OFFICE O/P EST LOW 20 MIN: CPT | Mod: 25,S$GLB,, | Performed by: PODIATRIST

## 2017-05-11 PROCEDURE — 3045F PR MOST RECENT HEMOGLOBIN A1C LEVEL 7.0-9.0%: CPT | Mod: S$GLB,,, | Performed by: PODIATRIST

## 2017-05-11 PROCEDURE — 1159F MED LIST DOCD IN RCRD: CPT | Mod: S$GLB,,, | Performed by: PODIATRIST

## 2017-05-11 PROCEDURE — 99999 PR PBB SHADOW E&M-EST. PATIENT-LVL III: CPT | Mod: PBBFAC,,, | Performed by: PODIATRIST

## 2017-05-11 RX ORDER — TRIAMCINOLONE ACETONIDE 40 MG/ML
20 INJECTION, SUSPENSION INTRA-ARTICULAR; INTRAMUSCULAR ONCE
Status: COMPLETED | OUTPATIENT
Start: 2017-05-11 | End: 2017-05-11

## 2017-05-11 RX ORDER — LIDOCAINE HYDROCHLORIDE 20 MG/ML
INJECTION, SOLUTION EPIDURAL; INFILTRATION; INTRACAUDAL; PERINEURAL ONCE
Status: CANCELLED | OUTPATIENT
Start: 2017-05-11 | End: 2017-05-11

## 2017-05-11 RX ORDER — BUPIVACAINE HYDROCHLORIDE 5 MG/ML
INJECTION, SOLUTION EPIDURAL; INTRACAUDAL ONCE
Status: CANCELLED | OUTPATIENT
Start: 2017-05-11 | End: 2017-05-11

## 2017-05-11 RX ORDER — DEXAMETHASONE SODIUM PHOSPHATE 4 MG/ML
4 INJECTION, SOLUTION INTRA-ARTICULAR; INTRALESIONAL; INTRAMUSCULAR; INTRAVENOUS; SOFT TISSUE
Status: CANCELLED | OUTPATIENT
Start: 2017-05-11 | End: 2017-05-11

## 2017-05-11 RX ORDER — TRIAMCINOLONE ACETONIDE 40 MG/ML
40 INJECTION, SUSPENSION INTRA-ARTICULAR; INTRAMUSCULAR
Status: CANCELLED | OUTPATIENT
Start: 2017-05-11 | End: 2017-05-11

## 2017-05-11 RX ORDER — DEXAMETHASONE SODIUM PHOSPHATE 4 MG/ML
2 INJECTION, SOLUTION INTRA-ARTICULAR; INTRALESIONAL; INTRAMUSCULAR; INTRAVENOUS; SOFT TISSUE ONCE
Status: COMPLETED | OUTPATIENT
Start: 2017-05-11 | End: 2017-05-11

## 2017-05-11 RX ORDER — BUPIVACAINE HYDROCHLORIDE 5 MG/ML
1 INJECTION, SOLUTION EPIDURAL; INTRACAUDAL ONCE
Status: COMPLETED | OUTPATIENT
Start: 2017-05-11 | End: 2017-05-11

## 2017-05-11 RX ORDER — LIDOCAINE HYDROCHLORIDE 20 MG/ML
1 INJECTION, SOLUTION EPIDURAL; INFILTRATION; INTRACAUDAL; PERINEURAL ONCE
Status: COMPLETED | OUTPATIENT
Start: 2017-05-11 | End: 2017-05-11

## 2017-05-11 RX ADMIN — LIDOCAINE HYDROCHLORIDE 20 MG: 20 INJECTION, SOLUTION EPIDURAL; INFILTRATION; INTRACAUDAL; PERINEURAL at 01:05

## 2017-05-11 RX ADMIN — TRIAMCINOLONE ACETONIDE 20 MG: 40 INJECTION, SUSPENSION INTRA-ARTICULAR; INTRAMUSCULAR at 01:05

## 2017-05-11 RX ADMIN — DEXAMETHASONE SODIUM PHOSPHATE 2 MG: 4 INJECTION, SOLUTION INTRA-ARTICULAR; INTRALESIONAL; INTRAMUSCULAR; INTRAVENOUS; SOFT TISSUE at 01:05

## 2017-05-11 RX ADMIN — BUPIVACAINE HYDROCHLORIDE 5 MG: 5 INJECTION, SOLUTION EPIDURAL; INTRACAUDAL at 01:05

## 2017-05-11 NOTE — PATIENT INSTRUCTIONS
"  What is Metatarsalgia?  Metatarsalgia is pain in the ball of your foot. It is often caused by wearing shoes with thin soles and high heels. This puts extra pressure on the bones in the ball of the foot. Standing or walking on a hard surface for long periods also puts added pressure on the bones, causing pain. The pain can occur under any of the five metatarsal bones, although it's most common in the second. Bent or twisted toes and bunions can make the problem worse. So can being overweight. Sometimes high arches or arthritis can also cause metatarsalgia.    Inside the ball of your foot  The long bones in the middle of your foot are called the metatarsal bones. Each metatarsal bone ends in the ball of the foot. When you walk, these bones bear the weight of your body as your foot pushes off the ground. If there is more pressure on the end of one bone, it presses on the skin beneath it. This causes pain and inflammation in the ball of the foot (metatarsalgia). A callus (a hard growth of skin) may also form on the ball of the foot.    Symptoms  The most common symptom of metatarsalgia is pain in the ball of the foot. It may feel as if you have a stone in your shoe. The ball of the foot may also become red and inflamed, and a callus may form under the end of the metatarsal bone.   Date Last Reviewed: 9/29/2015  © 0865-8322 [employer]. [address] [place] [address]. All rights reserved. This information is not intended as a substitute for professional medical care. Always follow your healthcare professional's instructions.        Treating Metatarsalgia  Metatarsalgia is pain in the "ball of your foot," the area between your arch and your toes. The pain usually starts in one or more of the five bones in this area under the toes (these bones are called the metatarsals). Often, a callus builds up in this area.In most cases, wearing low-heeled, well-cushioned shoes and filing down the callus will " relieve the pain. If these steps dont work, your healthcare provider may suggest surgery to remove part of the bone. To take pressure off the ball of your foot and relieve the pain, your healthcare provider may suggest that you do one or more of the following.  Wear shoes with padded soles  Wear shoes with thick padding in the soles. Keep heels low, and make sure the shoe is wide across the ball of your foot and your toes.    Using a metatarsal pad  Put a metatarsal pad in your shoe. This lifts and takes pressure off the painful area. To insert the pad:  · Put a small lipstick gerald on the callus.  · Step into the shoe to leave a gerald on the insole.  · Peel the backing off the pad. Then put the pad in the shoe just behind the lipstick gerald.  Inserts with built-in metatarsal pads may also be available.  Filing the callus  · Soak your foot in warm water for a few minutes to soften the skin.  · Dry the foot.  · Then gently rub the callus with a pumice stone or nail file to remove the hard skin. Stop if bleeding or pain occurs.  · Diabetes should get foot care from healthcare providers familiar with diabetes care.  Date Last Reviewed: 9/29/2015  © 0569-3229 [employer]. [address] [place] [address], [place] [address]. All rights reserved. This information is not intended as a substitute for professional medical care. Always follow your healthcare professional's instructions.

## 2017-05-11 NOTE — PROGRESS NOTES
"Subjective:      Patient ID: Percy Ramirez is a 76 y.o. male.    Chief Complaint: Foot Pain (left foot red and swollen pt had xray 04/24/2017 in chart Pcp Dr. Edmondson 03/06/2017) and Foot Problem    Percy is a 76 y.o. male who presents to the clinic for evaluation and treatment of high risk feet. Percy has a past medical history of Allergy; Arthritis; CAD, multiple vessel; Cataract; Depression; Hyperlipidemia; Hypertension; Macular degeneration; S/P CABG x 2; and Type 2 diabetes mellitus with circulatory disorder. The patient's chief complaint is pain and swelling to the left foot several weeks ago.  He states he believes he may have "hit his toe." pain to th distal 3rd digit and sub 3rd MTPJ.  Presented to urgent care with excruciating pain and had an xray, nonremarkable    Patient admits to barefoot walking often in and around home    This patient has documented high risk feet requiring routine maintenance secondary to diabetes mellitis and those secondary complications of diabetes, as mentioned..    PCP: Kasie Edmonsdon MD    Date Last Seen by PCP:   Chief Complaint   Patient presents with    Foot Pain     left foot red and swollen pt had xray 04/24/2017 in chart Pcp Dr. Edmondson 03/06/2017    Foot Problem     Current shoe gear:  Left darco shoe; left rx shoes (oxfords)    Hemoglobin A1C   Date Value Ref Range Status   10/22/2013 7.4 (H) 4.5 - 6.2 % Final     Patient Active Problem List   Diagnosis    Major depressive disorder, recurrent episode, mild    Hypertension    Type 2 diabetes, uncontrolled, with retinopathy    CAD, multiple vessel    S/P CABG x 2    Macular degeneration    Obstructive sleep apnea on CPAP    Anxiety state, unspecified    Insulin long-term use    Primary osteoarthritis of both knees    Chronic low back pain    DJD (degenerative joint disease), lumbar    Type 2 diabetes, uncontrolled, with neuropathy    Bilateral carotid artery disease    Hyperlipidemia LDL goal <100 " "   Type 2 diabetes, uncontrolled, with peripheral circulatory disorder    COPD (chronic obstructive pulmonary disease)    Atherosclerosis of abdominal aorta    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 1 Tablet, Delayed Release (E.C.) Oral Every day      BD INSULIN PEN NEEDLE UF SHORT 31 gauge x 5/16" Ndle       BD INSULIN SYRINGE ULTRA-FINE 1/2 mL 31 gauge x 15/64" Syrg       carvedilol (COREG) 25 MG tablet Take 25 mg by mouth. 1 Tablet Oral Twice a day       cinnamon bark 500 mg capsule Take 1,000 mg by mouth once daily.        CIPRODEX 0.3-0.1 % DrpS PLACE 3 DROP(S) IN AFFECTED EAR(S) 3 TIMES A DAY  0    clopidogrel (PLAVIX) 75 mg tablet Take 75 mg by mouth once daily.      clotrimazole-betamethasone 1-0.05% (LOTRISONE) cream       diazepam (VALIUM) 2 MG tablet Take 2 mg by mouth continuous prn.      duloxetine (CYMBALTA) 30 MG capsule Take 1 capsule (30 mg total) by mouth once daily. 30 capsule 10    furosemide (LASIX) 40 MG tablet       gabapentin (NEURONTIN) 300 MG capsule TAKE 2 CAPSULES BY MOUTH 3 TIMES DAILY 540 capsule 1    glimepiride (AMARYL) 4 MG tablet Take 8 mg by mouth daily with breakfast.       hydrocodone-acetaminophen 5-325mg (NORCO) 5-325 mg per tablet Take 1 tablet by mouth every 6 (six) hours as needed for Pain. 30 tablet 0    insulin NPH-insulin regular, 70/30, (NOVOLIN 70/30) 100 unit/mL (70-30) injection Inject into the skin. Inject 5 units at breakfast and inject 10 units at night      insulin syringe-needle U-100 1/2 mL 31 x 5/16" Syrg       ketoconazole (NIZORAL) 2 % cream APPLY TO AFFECTED AREAS ON TOENAILS TWICE DAILY  2    losartan (COZAAR) 100 MG tablet Take 1 tablet (100 " mg total) by mouth once daily. 90 tablet 3    metformin (GLUCOPHAGE) 500 MG tablet Take 1,000 mg by mouth 2 (two) times daily with meals. 2 Tablets in the morning, 2 Tablets in the evening      nitroGLYCERIN (NITROSTAT) 0.3 MG SL tablet PLACE 1 TABLET UNDER TONGUE AS NEEDED FOR CHEST PAIN EVERY 5 MINUTES, UP TO 3 DOSES IN 15MINUTES  2    OM-3/DHA/EPA/FISH OIL/VIT D3 (FISH OIL-VIT D3 ORAL) Take 2,000 Units by mouth once daily.       pravastatin (PRAVACHOL) 20 MG tablet Take 20 mg by mouth once daily.      spironolactone (ALDACTONE) 25 MG tablet 25 mg once daily.       VIT C/VIT E AC/LUT/COPPER/ZINC (PRESERVISION LUTEIN ORAL) Take by mouth.      warfarin (COUMADIN) 5 MG tablet Take 2 tabs by mouth on Tuesday and on all other days .5 tabs by mouth      albuterol 90 mcg/actuation inhaler Inhale 2 puffs into the lungs every 6 (six) hours as needed for Wheezing or Shortness of Breath. 18 g 2     No current facility-administered medications on file prior to visit.      Review of patient's allergies indicates:   Allergen Reactions    Codeine Nausea Only     weak     Past Surgical History:   Procedure Laterality Date    broken elbow      left    EYE SURGERY      cataract    heart bypass      2004    HERNIA REPAIR      right    ROTATOR CUFF REPAIR      right  2004     Family History   Problem Relation Age of Onset    Arthritis Mother     Diabetes Mother     Stroke Mother     Heart attack Father     Cancer Brother     Throat cancer Brother     Diabetes Maternal Aunt     Diabetes Maternal Uncle     Heart disease Maternal Uncle     Diabetes Paternal Aunt     Heart disease Paternal Aunt     Heart disease Paternal Uncle     Heart disease Maternal Grandmother     Cancer Maternal Grandfather      Social History     Social History    Marital status:      Spouse name: N/A    Number of children: 4    Years of education: GED     Occupational History    retired tug       Social History  "Main Topics    Smoking status: Former Smoker     Packs/day: 3.00     Years: 45.00     Types: Cigarettes     Quit date: 4/20/2004    Smokeless tobacco: Never Used    Alcohol use Yes      Comment: was heavy drinker 35 years ago, rare use now    Drug use: No    Sexual activity: Yes     Partners: Female     Other Topics Concern    Not on file     Social History Narrative     Review of Systems   Constitution: Negative for chills, fever and weakness.   Cardiovascular: Negative for claudication and leg swelling.   Respiratory: Positive for cough. Negative for shortness of breath.    Skin: Positive for dry skin and nail changes. Negative for itching and rash.   Musculoskeletal: Positive for arthritis, back pain and joint pain. Negative for falls, joint swelling and muscle weakness.   Gastrointestinal: Negative for diarrhea, nausea and vomiting.   Neurological: Positive for numbness and paresthesias. Negative for tremors.   Psychiatric/Behavioral: Negative for altered mental status and hallucinations.           Objective:       Vitals:    05/11/17 1111   BP: 136/66   Weight: 77.6 kg (171 lb)   Height: 5' 8" (1.727 m)   PainSc: 10-Worst pain ever   PainLoc: Foot       Physical Exam   Constitutional:   General: Pt. is well-developed, well-nourished, appears stated age, in no acute distress, alert and oriented x 3. No evidence of depression, anxiety, or agitation. Calm, cooperative, and communicative. Appropriate interactions and affect.       Cardiovascular:   Pulses:       Dorsalis pedis pulses are 2+ on the right side, and 2+ on the left side.        Posterior tibial pulses are 1+ on the right side, and 1+ on the left side.   There is decreased digital hair.   Musculoskeletal:        Right foot: There is normal range of motion.        Left foot: There is tenderness (sub 3rd MTPJ and with PF). There is normal range of motion.   Muscle strength is 5/5 in all groups bilaterally.    Decreased stride, station of gait.  " apropulsive toe off.  Increased angle and base of gait.    Patient has hammertoes of digits 2-5 bilateral partially reducible without symptom today.    Visible and palpable bunion without pain at dorsomedial 1st metatarsal head right and left.  Hallux abducted right and left partially reducible, tracks laterally without being track bound.  No ecchymosis, erythema, edema, or cardinal signs infection or signs of trauma same foot.     Neurological: A sensory deficit is present.   Sanford-Keon 5.07 monofilamant testing is diminished Farhad feet. Sharp/dull sensation diminished Bilaterally   Skin: Skin is warm, dry and intact. No abrasion, no ecchymosis, no lesion and no rash noted. He is not diaphoretic. No cyanosis or erythema. No pallor. Nails show no clubbing.   Toenails 1-5 bilaterally are elongated by 2-3 mm, thickened by 2-3 mm, discolored/yellowed, dystrophic, brittle with subungual debris.    Focal hyperkeratotic lesion consisting entirely of hyperkeratotic tissue without open skin, drainage, pus, fluctuance, malodor, or signs of infection: medial IPJ of hallux farhad    Interdigital Spaces clean, dry and without evidence of break in skin integrity   Psychiatric: He has a normal mood and affect. His speech is normal.   Nursing note and vitals reviewed.        Assessment:       Encounter Diagnoses   Name Primary?    Type 2 diabetes, uncontrolled, with peripheral circulatory disorder Yes    Type II diabetes mellitus with neurological manifestations     Foot pain, left     Metatarsalgia of left foot     Hammer toes of both feet     Hallux abducto valgus, unspecified laterality          Plan:       Percy was seen today for foot pain and foot problem.    Diagnoses and all orders for this visit:    Type 2 diabetes, uncontrolled, with peripheral circulatory disorder    Type II diabetes mellitus with neurological manifestations    Foot pain, left    Metatarsalgia of left foot    Hammer toes of both feet    Hallux  abducto valgus, unspecified laterality    Other orders  -     bupivacaine (PF) 0.5% (5 mg/ml) injection 5 mg; Inject 1 mL (5 mg total) into the articular space once.  -     dexamethasone injection 2 mg; Inject 0.5 mLs (2 mg total) into the articular space once.  -     lidocaine (PF) 20 mg/ml (2%) injection 20 mg; 1 mL (20 mg total) by Other route once.  -     triamcinolone acetonide injection 20 mg; Inject 0.5 mLs (20 mg total) into the articular space once.  -     bupivacaine (PF) 0.5% (5 mg/ml) injection; by Intrapleural route once.  -     dexamethasone injection 4 mg; Inject 1 mL (4 mg total) into the vein one time.  -     lidocaine (PF) 20 mg/ml (2%) injection; by Other route once.  -     triamcinolone acetonide injection 40 mg; Inject 1 mL (40 mg total) into the muscle one time.    I counseled the patient on his conditions, their implications and medical management.      - Shoe inspection. Diabetic Foot Education. Patient reminded of the importance of good nutrition and blood sugar control to help prevent podiatric complications of diabetes. Patient instructed on proper foot hygeine. We discussed wearing proper shoe gear, daily foot inspections, never walking without protective shoe gear, never putting sharp instruments to feet.      -- Conservative treatments for hammer digit discussed include padding, hammertoe crest  Pads, extra-depth shoes; Surgical treatments discussed, including hammertoe correction and generic post-op course. All questions answered. Patient opting to begin with conservative options first.      -- Patient instructed on adequate icing techniques. Patient should ice the affected area at least once per day x 10 minutes for 10 days . I advised the  patient that extra icing would also be beneficial to ensure adequate anti inflammatory effect.    -- Patient would like injection today.  With patient's permission,  a cortisone injection was performed sub 3rd MTPJ joint of the left foot approach,  using 3ccs of mixture of (1cc 1% plain Lidocaine : 1cc 0.5% Marcaine plain:  0.5cc kenalog-40 : 0.5cc dexamethasone).   This was well tolerated.           --Dispensed information on proper shoe fit and modification including inserts, met pads.    --RTC in 60-70 days, sooner PRN

## 2017-05-11 NOTE — MR AVS SNAPSHOT
[facility]  [address] [facility] [facility]  [place] [address]  Phone: [phone]                  Percy Ramirez   2017 11:00 AM   Office Visit    Description:  Male : 1940   Provider:  Marifer Romero DPM   Department:  [facility]           Reason for Visit     Foot Pain     Foot Problem           Diagnoses this Visit        Comments    Type 2 diabetes, uncontrolled, with peripheral circulatory disorder    -  Primary     Type II diabetes mellitus with neurological manifestations         Foot pain, left         Metatarsalgia of left foot         Hammer toes of both feet         Hallux abducto valgus, unspecified laterality                To Do List           Future Appointments        Provider Department Dept Phone    2017 10:00 AM Kasie Edmondson MD [facility] [phone]      Goals (5 Years of Data)              10/22/13    HEMOGLOBIN A1C < 7.5   7.4      [facility]     [facility] Nurse Care Line -  Assistance  Unless otherwise directed by your provider, please contact [facility], our nurse care line that is available for  assistance.     Registered nurses in the [facility] provide: appointment scheduling, clinical advisement, health education, and other advisory services.  Call: [phone] (toll free)               Medications           Message regarding Medications     Verify the changes and/or additions to your medication regime listed below are the same as discussed with your clinician today.  If any of these changes or additions are incorrect, please notify your healthcare provider.        These medications were administered today        Dose Freq    bupivacaine (PF) 0.5% (5 mg/ml) injection 5 mg 1 mL Once    Sig: Inject 1 mL (5 mg total) into the articular space once.    Class: Normal    Route: Intra-articular    dexamethasone injection 2 mg 2 mg Once    Sig: Inject 0.5 mLs (2 mg total) into the articular space once.     "Class: Normal    Route: Intra-articular    lidocaine (PF) 20 mg/ml (2%) injection 20 mg 1 mL Once    Si mL (20 mg total) by Other route once.    Class: Normal    Route: Other    triamcinolone acetonide injection 20 mg 20 mg Once    Sig: Inject 0.5 mLs (20 mg total) into the articular space once.    Class: Normal    Route: Intra-articular           Verify that the below list of medications is an accurate representation of the medications you are currently taking.  If none reported, the list may be blank. If incorrect, please contact your healthcare provider. Carry this list with you in case of emergency.           Current Medications     aspirin (ECOTRIN) 81 MG EC tablet Take 81 mg by mouth. 1 Tablet, Delayed Release (E.C.) Oral Every day    BD INSULIN PEN NEEDLE UF SHORT 31 gauge x 5/16" Ndle     BD INSULIN SYRINGE ULTRA-FINE 1/2 mL 31 gauge x 15/64" Syrg     carvedilol (COREG) 25 MG tablet Take 25 mg by mouth. 1 Tablet Oral Twice a day     cinnamon bark 500 mg capsule Take 1,000 mg by mouth once daily.      CIPRODEX 0.3-0.1 % DrpS PLACE 3 DROP(S) IN AFFECTED EAR(S) 3 TIMES A DAY    clopidogrel (PLAVIX) 75 mg tablet Take 75 mg by mouth once daily.    clotrimazole-betamethasone 1-0.05% (LOTRISONE) cream     diazepam (VALIUM) 2 MG tablet Take 2 mg by mouth continuous prn.    duloxetine (CYMBALTA) 30 MG capsule Take 1 capsule (30 mg total) by mouth once daily.    furosemide (LASIX) 40 MG tablet     gabapentin (NEURONTIN) 300 MG capsule TAKE 2 CAPSULES BY MOUTH 3 TIMES DAILY    glimepiride (AMARYL) 4 MG tablet Take 8 mg by mouth daily with breakfast.     hydrocodone-acetaminophen 5-325mg (NORCO) 5-325 mg per tablet Take 1 tablet by mouth every 6 (six) hours as needed for Pain.    insulin NPH-insulin regular, 70/30, (NOVOLIN 70/30) 100 unit/mL (70-30) injection Inject into the skin. Inject 5 units at breakfast and inject 10 units at night    insulin syringe-needle U-100 1/2 mL 31 x 5/16" Syrg     ketoconazole " "(NIZORAL) 2 % cream APPLY TO AFFECTED AREAS ON TOENAILS TWICE DAILY    losartan (COZAAR) 100 MG tablet Take 1 tablet (100 mg total) by mouth once daily.    metformin (GLUCOPHAGE) 500 MG tablet Take 1,000 mg by mouth 2 (two) times daily with meals. 2 Tablets in the morning, 2 Tablets in the evening    nitroGLYCERIN (NITROSTAT) 0.3 MG SL tablet PLACE 1 TABLET UNDER TONGUE AS NEEDED FOR CHEST PAIN EVERY 5 MINUTES, UP TO 3 DOSES IN 15MINUTES    OM-3/DHA/EPA/FISH OIL/VIT D3 (FISH OIL-VIT D3 ORAL) Take 2,000 Units by mouth once daily.     pravastatin (PRAVACHOL) 20 MG tablet Take 20 mg by mouth once daily.    spironolactone (ALDACTONE) 25 MG tablet 25 mg once daily.     VIT C/VIT E AC/LUT/COPPER/ZINC (PRESERVISION LUTEIN ORAL) Take by mouth.    warfarin (COUMADIN) 5 MG tablet Take 2 tabs by mouth on Tuesday and on all other days .5 tabs by mouth    albuterol 90 mcg/actuation inhaler Inhale 2 puffs into the lungs every 6 (six) hours as needed for Wheezing or Shortness of Breath.           Clinical Reference Information           Your Vitals Were     BP Height Weight BMI       136/66 5' 8" (1.727 m) 77.6 kg (171 lb) 26 kg/m2       Blood Pressure          Most Recent Value    BP  136/66      Allergies as of 5/11/2017     Codeine      Immunizations Administered on Date of Encounter - 5/11/2017     None      Instructions      What is Metatarsalgia?  Metatarsalgia is pain in the ball of your foot. It is often caused by wearing shoes with thin soles and high heels. This puts extra pressure on the bones in the ball of the foot. Standing or walking on a hard surface for long periods also puts added pressure on the bones, causing pain. The pain can occur under any of the five metatarsal bones, although it's most common in the second. Bent or twisted toes and bunions can make the problem worse. So can being overweight. Sometimes high arches or arthritis can also cause metatarsalgia.    Inside the ball of your foot  The long bones in " "the middle of your foot are called the metatarsal bones. Each metatarsal bone ends in the ball of the foot. When you walk, these bones bear the weight of your body as your foot pushes off the ground. If there is more pressure on the end of one bone, it presses on the skin beneath it. This causes pain and inflammation in the ball of the foot (metatarsalgia). A callus (a hard growth of skin) may also form on the ball of the foot.    Symptoms  The most common symptom of metatarsalgia is pain in the ball of the foot. It may feel as if you have a stone in your shoe. The ball of the foot may also become red and inflamed, and a callus may form under the end of the metatarsal bone.   Date Last Reviewed: 9/29/2015 © 2000-2016 [employer]. [address] [place] [address]. All rights reserved. This information is not intended as a substitute for professional medical care. Always follow your healthcare professional's instructions.        Treating Metatarsalgia  Metatarsalgia is pain in the "ball of your foot," the area between your arch and your toes. The pain usually starts in one or more of the five bones in this area under the toes (these bones are called the metatarsals). Often, a callus builds up in this area.In most cases, wearing low-heeled, well-cushioned shoes and filing down the callus will relieve the pain. If these steps dont work, your healthcare provider may suggest surgery to remove part of the bone. To take pressure off the ball of your foot and relieve the pain, your healthcare provider may suggest that you do one or more of the following.  Wear shoes with padded soles  Wear shoes with thick padding in the soles. Keep heels low, and make sure the shoe is wide across the ball of your foot and your toes.    Using a metatarsal pad  Put a metatarsal pad in your shoe. This lifts and takes pressure off the painful area. To insert the pad:  · Put a small lipstick gerald on the callus.  · Step " into the shoe to leave a gerald on the insole.  · Peel the backing off the pad. Then put the pad in the shoe just behind the lipstick gerald.  Inserts with built-in metatarsal pads may also be available.  Filing the callus  · Soak your foot in warm water for a few minutes to soften the skin.  · Dry the foot.  · Then gently rub the callus with a pumice stone or nail file to remove the hard skin. Stop if bleeding or pain occurs.  · Diabetes should get foot care from healthcare providers familiar with diabetes care.  Date Last Reviewed: 9/29/2015  © 6783-3444 [employer]. [address] [place] [address], [place] [address]. All rights reserved. This information is not intended as a substitute for professional medical care. Always follow your healthcare professional's instructions.             Language Assistance Services     ATTENTION: Language assistance services are available, free of charge. Please call [phone].      ATENCIÓN: Si jessicala aj, tiene a huerta disposición servicios gratuitos de asistencia lingüística. Llame al [phone].     CHÚ Ý: N?u b?n nói Ti?ng Vi?t, có các d?ch v? h? tr? ngôn ng? mi?n phí dành cho b?n. G?i s? [phone].         [facility] complies with applicable Federal civil rights laws and does not discriminate on the basis of race, color, national origin, age, disability, or sex.

## 2017-05-26 ENCOUNTER — TELEPHONE (OUTPATIENT)
Dept: FAMILY MEDICINE | Facility: CLINIC | Age: 77
End: 2017-05-26

## 2017-05-26 NOTE — TELEPHONE ENCOUNTER
----- Message from Ricky Mesa sent at 5/26/2017  2:13 PM CDT -----  Contact: Dinesh Cagle  States she's returning a call to [facility]. Jennifer can be reached @ [phone].

## 2017-05-26 NOTE — TELEPHONE ENCOUNTER
Spoke w/patient, requesting CPAP be changed form 14cm to 11cm. States the pressure is too high and he cannot sleep, states the machine muffles.  Spoke with Jennifer with  sleep department, states patient's last sleep study was done this year and new setting is at 14cm. States they will give patient a call to see what the issue is  And if need will have patient come in with his CPAP machine to trouble shoot.

## 2017-06-08 DIAGNOSIS — E11.9 TYPE 2 DIABETES MELLITUS WITHOUT COMPLICATION: ICD-10-CM

## 2017-07-03 LAB
A1C: 6.5
CHOLESTEROL, TOTAL: 105
HDLC SERPL-MCNC: 36 MG/DL (ref 35–70)
LDLC SERPL CALC-MCNC: 57 MG/DL
TRIGLYCERIDE (LIPID PAN): 58

## 2017-07-06 LAB — MICROALB/CRT. RATIO: 8

## 2017-07-12 ENCOUNTER — OFFICE VISIT (OUTPATIENT)
Dept: FAMILY MEDICINE | Facility: CLINIC | Age: 77
End: 2017-07-12
Payer: MEDICARE

## 2017-07-12 ENCOUNTER — OFFICE VISIT (OUTPATIENT)
Dept: PODIATRY | Facility: CLINIC | Age: 77
End: 2017-07-12
Payer: MEDICARE

## 2017-07-12 VITALS
BODY MASS INDEX: 26.95 KG/M2 | DIASTOLIC BLOOD PRESSURE: 78 MMHG | OXYGEN SATURATION: 96 % | HEIGHT: 68 IN | SYSTOLIC BLOOD PRESSURE: 132 MMHG | TEMPERATURE: 98 F | HEART RATE: 62 BPM | WEIGHT: 177.81 LBS

## 2017-07-12 VITALS
SYSTOLIC BLOOD PRESSURE: 120 MMHG | HEART RATE: 66 BPM | HEIGHT: 68 IN | WEIGHT: 171 LBS | BODY MASS INDEX: 25.91 KG/M2 | DIASTOLIC BLOOD PRESSURE: 67 MMHG

## 2017-07-12 DIAGNOSIS — I10 ESSENTIAL HYPERTENSION: Chronic | ICD-10-CM

## 2017-07-12 DIAGNOSIS — M20.42 HAMMER TOES OF BOTH FEET: ICD-10-CM

## 2017-07-12 DIAGNOSIS — Z12.11 COLON CANCER SCREENING: ICD-10-CM

## 2017-07-12 DIAGNOSIS — Z79.4 CONTROLLED TYPE 2 DIABETES MELLITUS WITH RETINOPATHY, WITH LONG-TERM CURRENT USE OF INSULIN, MACULAR EDEMA PRESENCE UNSPECIFIED, UNSPECIFIED LATERALITY, UNSPECIFIED RETINOPATHY SEVERITY: Primary | ICD-10-CM

## 2017-07-12 DIAGNOSIS — E11.40 CONTROLLED TYPE 2 DIABETES WITH NEUROPATHY: ICD-10-CM

## 2017-07-12 DIAGNOSIS — M20.10 HALLUX ABDUCTO VALGUS, UNSPECIFIED LATERALITY: ICD-10-CM

## 2017-07-12 DIAGNOSIS — I20.89 CHRONIC STABLE ANGINA: ICD-10-CM

## 2017-07-12 DIAGNOSIS — L84 CORN OR CALLUS: ICD-10-CM

## 2017-07-12 DIAGNOSIS — J44.9 CHRONIC OBSTRUCTIVE PULMONARY DISEASE, UNSPECIFIED COPD TYPE: ICD-10-CM

## 2017-07-12 DIAGNOSIS — E11.51 TYPE 2 DIABETES WITH PERIPHERAL CIRCULATORY DISORDER, CONTROLLED: ICD-10-CM

## 2017-07-12 DIAGNOSIS — B35.1 ONYCHOMYCOSIS DUE TO DERMATOPHYTE: ICD-10-CM

## 2017-07-12 DIAGNOSIS — E11.21 CONTROLLED TYPE 2 DIABETES MELLITUS WITH PROTEINURIC DIABETIC NEPHROPATHY: ICD-10-CM

## 2017-07-12 DIAGNOSIS — E11.49 TYPE II DIABETES MELLITUS WITH NEUROLOGICAL MANIFESTATIONS: ICD-10-CM

## 2017-07-12 DIAGNOSIS — Z79.4 INSULIN LONG-TERM USE: ICD-10-CM

## 2017-07-12 DIAGNOSIS — E11.319 CONTROLLED TYPE 2 DIABETES MELLITUS WITH RETINOPATHY, WITH LONG-TERM CURRENT USE OF INSULIN, MACULAR EDEMA PRESENCE UNSPECIFIED, UNSPECIFIED LATERALITY, UNSPECIFIED RETINOPATHY SEVERITY: Primary | ICD-10-CM

## 2017-07-12 DIAGNOSIS — I70.0 ATHEROSCLEROSIS OF ABDOMINAL AORTA: ICD-10-CM

## 2017-07-12 DIAGNOSIS — E78.5 HYPERLIPIDEMIA LDL GOAL <100: ICD-10-CM

## 2017-07-12 DIAGNOSIS — M20.41 HAMMER TOES OF BOTH FEET: ICD-10-CM

## 2017-07-12 DIAGNOSIS — I48.0 PAROXYSMAL ATRIAL FIBRILLATION: ICD-10-CM

## 2017-07-12 PROCEDURE — 99499 UNLISTED E&M SERVICE: CPT | Mod: S$PBB,,, | Performed by: INTERNAL MEDICINE

## 2017-07-12 PROCEDURE — 1159F MED LIST DOCD IN RCRD: CPT | Mod: S$GLB,,, | Performed by: INTERNAL MEDICINE

## 2017-07-12 PROCEDURE — 99214 OFFICE O/P EST MOD 30 MIN: CPT | Mod: S$GLB,,, | Performed by: INTERNAL MEDICINE

## 2017-07-12 PROCEDURE — 3044F HG A1C LEVEL LT 7.0%: CPT | Mod: S$GLB,,, | Performed by: INTERNAL MEDICINE

## 2017-07-12 PROCEDURE — 99499 UNLISTED E&M SERVICE: CPT | Mod: S$PBB,,, | Performed by: PODIATRIST

## 2017-07-12 PROCEDURE — 1126F AMNT PAIN NOTED NONE PRSNT: CPT | Mod: S$GLB,,, | Performed by: INTERNAL MEDICINE

## 2017-07-12 PROCEDURE — 99999 PR PBB SHADOW E&M-EST. PATIENT-LVL III: CPT | Mod: PBBFAC,,, | Performed by: INTERNAL MEDICINE

## 2017-07-12 PROCEDURE — 11721 DEBRIDE NAIL 6 OR MORE: CPT | Mod: 59,Q9,S$GLB, | Performed by: PODIATRIST

## 2017-07-12 PROCEDURE — 99999 PR PBB SHADOW E&M-EST. PATIENT-LVL III: CPT | Mod: PBBFAC,,, | Performed by: PODIATRIST

## 2017-07-12 PROCEDURE — 99499 UNLISTED E&M SERVICE: CPT | Mod: S$GLB,,, | Performed by: PODIATRIST

## 2017-07-12 PROCEDURE — 11056 PARNG/CUTG B9 HYPRKR LES 2-4: CPT | Mod: Q9,S$GLB,, | Performed by: PODIATRIST

## 2017-07-12 NOTE — PROGRESS NOTES
Subjective:      Patient ID: Percy Ramirez is a 76 y.o. male.    Chief Complaint: Diabetes Mellitus (pcp Dr. Edmondson ); Diabetic Foot Exam; and Nail Care    Percy is a 76 y.o. male who presents to the clinic for evaluation and treatment of high risk feet. Percy has a past medical history of Allergy; Arthritis; CAD, multiple vessel; Cataract; Depression; Hyperlipidemia; Hypertension; Macular degeneration; S/P CABG x 2; and Type 2 diabetes mellitus with circulatory disorder. The patient's chief complaint is elongated, thickened toenails aggravated by increased weight bearing, shoe gear, pressure.    Patient admits to barefoot walking often in and around home    This patient has documented high risk feet requiring routine maintenance secondary to diabetes mellitis and those secondary complications of diabetes, as mentioned..    PCP: Kasie Edmondson MD    Date Last Seen by PCP:   Chief Complaint   Patient presents with    Diabetes Mellitus     pcp Dr. Edmondson     Diabetic Foot Exam    Nail Care     Current shoe gear:  Left darco shoe; left rx shoes (oxfords)    Hemoglobin A1C   Date Value Ref Range Status   10/22/2013 7.4 (H) 4.5 - 6.2 % Final     Patient Active Problem List   Diagnosis    Major depressive disorder, recurrent episode, mild    Hypertension    Type 2 diabetes, uncontrolled, with retinopathy    CAD, multiple vessel    S/P CABG x 2    Macular degeneration    Obstructive sleep apnea on CPAP    Anxiety state, unspecified    Insulin long-term use    Primary osteoarthritis of both knees    Chronic low back pain    DJD (degenerative joint disease), lumbar    Type 2 diabetes, uncontrolled, with neuropathy    Bilateral carotid artery disease    Hyperlipidemia LDL goal <100    Type 2 diabetes, uncontrolled, with peripheral circulatory disorder    COPD (chronic obstructive pulmonary disease)    Atherosclerosis of abdominal aorta    Uncontrolled type 2 diabetes mellitus with proteinuric  "diabetic nephropathy    Overweight (BMI 25.0-29.9)    Paroxysmal atrial fibrillation    Chronic stable angina    Senile purpura     Current Outpatient Prescriptions on File Prior to Visit   Medication Sig Dispense Refill    aspirin (ECOTRIN) 81 MG EC tablet Take 81 mg by mouth. 1 Tablet, Delayed Release (E.C.) Oral Every day      BD INSULIN PEN NEEDLE UF SHORT 31 gauge x 5/16" Ndle       BD INSULIN SYRINGE ULTRA-FINE 1/2 mL 31 gauge x 15/64" Syrg       carvedilol (COREG) 25 MG tablet Take 25 mg by mouth. 1 Tablet Oral Twice a day       cinnamon bark 500 mg capsule Take 1,000 mg by mouth once daily.        CIPRODEX 0.3-0.1 % DrpS PLACE 3 DROP(S) IN AFFECTED EAR(S) 3 TIMES A DAY  0    clopidogrel (PLAVIX) 75 mg tablet Take 75 mg by mouth once daily.      clotrimazole-betamethasone 1-0.05% (LOTRISONE) cream       diazepam (VALIUM) 2 MG tablet Take 2 mg by mouth continuous prn.      duloxetine (CYMBALTA) 30 MG capsule Take 1 capsule (30 mg total) by mouth once daily. 30 capsule 10    furosemide (LASIX) 40 MG tablet       gabapentin (NEURONTIN) 300 MG capsule TAKE 2 CAPSULES BY MOUTH 3 TIMES DAILY 540 capsule 1    glimepiride (AMARYL) 4 MG tablet Take 8 mg by mouth daily with breakfast.       hydrocodone-acetaminophen 5-325mg (NORCO) 5-325 mg per tablet Take 1 tablet by mouth every 6 (six) hours as needed for Pain. 30 tablet 0    insulin NPH-insulin regular, 70/30, (NOVOLIN 70/30) 100 unit/mL (70-30) injection Inject into the skin. Inject 5 units at breakfast and inject 10 units at night      insulin syringe-needle U-100 1/2 mL 31 x 5/16" Syrg       ketoconazole (NIZORAL) 2 % cream APPLY TO AFFECTED AREAS ON TOENAILS TWICE DAILY  2    losartan (COZAAR) 100 MG tablet Take 1 tablet (100 mg total) by mouth once daily. 90 tablet 3    metformin (GLUCOPHAGE) 500 MG tablet Take 1,000 mg by mouth 2 (two) times daily with meals. 2 Tablets in the morning, 2 Tablets in the evening      nitroGLYCERIN " (NITROSTAT) 0.3 MG SL tablet PLACE 1 TABLET UNDER TONGUE AS NEEDED FOR CHEST PAIN EVERY 5 MINUTES, UP TO 3 DOSES IN 15MINUTES  2    OM-3/DHA/EPA/FISH OIL/VIT D3 (FISH OIL-VIT D3 ORAL) Take 2,000 Units by mouth once daily.       pravastatin (PRAVACHOL) 20 MG tablet Take 20 mg by mouth once daily.      spironolactone (ALDACTONE) 25 MG tablet 25 mg once daily.       VIT C/VIT E AC/LUT/COPPER/ZINC (PRESERVISION LUTEIN ORAL) Take by mouth.      warfarin (COUMADIN) 5 MG tablet Take 2 tabs by mouth on Tuesday and on all other days .5 tabs by mouth      albuterol 90 mcg/actuation inhaler Inhale 2 puffs into the lungs every 6 (six) hours as needed for Wheezing or Shortness of Breath. 18 g 2     No current facility-administered medications on file prior to visit.      Review of patient's allergies indicates:   Allergen Reactions    Codeine Nausea Only     weak     Past Surgical History:   Procedure Laterality Date    broken elbow      left    EYE SURGERY      cataract    heart bypass      2004    HERNIA REPAIR      right    ROTATOR CUFF REPAIR      right  2004     Family History   Problem Relation Age of Onset    Arthritis Mother     Diabetes Mother     Stroke Mother     Heart attack Father     Cancer Brother     Throat cancer Brother     Diabetes Maternal Aunt     Diabetes Maternal Uncle     Heart disease Maternal Uncle     Diabetes Paternal Aunt     Heart disease Paternal Aunt     Heart disease Paternal Uncle     Heart disease Maternal Grandmother     Cancer Maternal Grandfather      Social History     Social History    Marital status:      Spouse name: N/A    Number of children: 4    Years of education: GED     Occupational History    retired tug       Social History Main Topics    Smoking status: Former Smoker     Packs/day: 3.00     Years: 45.00     Types: Cigarettes     Quit date: 4/20/2004    Smokeless tobacco: Never Used    Alcohol use Yes      Comment: was heavy  "drinker 35 years ago, rare use now    Drug use: No    Sexual activity: Yes     Partners: Female     Other Topics Concern    Not on file     Social History Narrative    No narrative on file     Review of Systems   Constitution: Negative for chills, fever and weakness.   Cardiovascular: Negative for claudication and leg swelling.   Respiratory: Positive for cough. Negative for shortness of breath.    Skin: Positive for dry skin and nail changes. Negative for itching and rash.   Musculoskeletal: Positive for arthritis, back pain and joint pain. Negative for falls, joint swelling and muscle weakness.   Gastrointestinal: Negative for diarrhea, nausea and vomiting.   Neurological: Positive for numbness and paresthesias. Negative for tremors.   Psychiatric/Behavioral: Negative for altered mental status and hallucinations.           Objective:       Vitals:    07/12/17 0858   BP: 120/67   Pulse: 66   Weight: 77.6 kg (171 lb)   Height: 5' 8" (1.727 m)   PainSc: 0-No pain       Physical Exam   Constitutional:   General: Pt. is well-developed, well-nourished, appears stated age, in no acute distress, alert and oriented x 3. No evidence of depression, anxiety, or agitation. Calm, cooperative, and communicative. Appropriate interactions and affect.       Cardiovascular:   Pulses:       Dorsalis pedis pulses are 2+ on the right side, and 2+ on the left side.        Posterior tibial pulses are 1+ on the right side, and 1+ on the left side.   There is decreased digital hair.   Musculoskeletal:        Right foot: There is normal range of motion.        Left foot:  There is normal range of motion.   Muscle strength is 5/5 in all groups bilaterally.    Decreased stride, station of gait.  apropulsive toe off.  Increased angle and base of gait.    Patient has hammertoes of digits 2-5 bilateral partially reducible without symptom today.    Visible and palpable bunion without pain at dorsomedial 1st metatarsal head right and left.  " Hallux abducted right and left partially reducible, tracks laterally without being track bound.  No ecchymosis, erythema, edema, or cardinal signs infection or signs of trauma same foot.     Neurological: A sensory deficit is present.   Wedron-Keon 5.07 monofilamant testing is diminished Farhad feet. Sharp/dull sensation diminished Bilaterally   Skin: Skin is warm, dry and intact. No abrasion, no ecchymosis, no lesion and no rash noted. He is not diaphoretic. No cyanosis or erythema. No pallor. Nails show no clubbing.   Toenails 1-5 bilaterally are elongated by 2-3 mm, thickened by 2-3 mm, discolored/yellowed, dystrophic, brittle with subungual debris.    Focal hyperkeratotic lesion consisting entirely of hyperkeratotic tissue without open skin, drainage, pus, fluctuance, malodor, or signs of infection: medial IPJ of hallux farhad    Interdigital Spaces clean, dry and without evidence of break in skin integrity   Psychiatric: He has a normal mood and affect. His speech is normal.   Nursing note and vitals reviewed.        Assessment:       Encounter Diagnoses   Name Primary?    Type 2 diabetes, uncontrolled, with peripheral circulatory disorder Yes    Type II diabetes mellitus with neurological manifestations     Hammer toes of both feet     Hallux abducto valgus, unspecified laterality     Corn or callus     Onychomycosis due to dermatophyte          Plan:       Percy was seen today for diabetes mellitus, diabetic foot exam and nail care.    Diagnoses and all orders for this visit:    Type 2 diabetes, uncontrolled, with peripheral circulatory disorder    Type II diabetes mellitus with neurological manifestations    Hammer toes of both feet    Hallux abducto valgus, unspecified laterality    Corn or callus    Onychomycosis due to dermatophyte      I counseled the patient on his conditions, their implications and medical management.      - Shoe inspection. Diabetic Foot Education. Patient reminded of the  importance of good nutrition and blood sugar control to help prevent podiatric complications of diabetes. Patient instructed on proper foot hygeine. We discussed wearing proper shoe gear, daily foot inspections, never walking without protective shoe gear, never putting sharp instruments to feet    - With patient's permission, nails were aggressively reduced and debrided x 10 to their soft tissue attachment mechanically and with electric , removing all offending nail and debris. Patient relates relief following the procedure.     - After cleansing the  area w/ alcohol prep pad the above mentioned hyperkeratosis was trimmed utilizing No 15 scapel, to a smooth base with out incident. Patient tolerated this  well and reported comfort to the area of medial hallux IPJ concha    - He will continue to monitor the areas daily, inspect his feet, wear protective shoe gear when ambulatory, moisturizer to maintain skin integrity and follow in this office in approximately 60-70 days, sooner PRN

## 2017-07-12 NOTE — PROGRESS NOTES
HISTORY OF PRESENT ILLNESS:  Percy Ramirez is a 76 y.o. male who presents to the clinic today for Hypertension; Diabetes; and Follow-up  .  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  The patient does not check his blood sugars at home.  He has very poor vision.  He has been followed by endocrinology, but would like to see if I can manage his diabetes going forward.  He also has hypertension, hyperlipidemia, paroxysmal atrial fibrillation, and angina.  He is followed closely by cardiology.  He has stable COPD.  He denies cardiac chest pain or shortness of breath.  He is on Coumadin which is followed by the Coumadin clinic with the heart clinic [facility].  He denies abdominal pain, temperature intolerance, or unexplained changes in his weight.      PAST MEDICAL HISTORY:  Past Medical History:   Diagnosis Date    Allergy     Arthritis     CAD, multiple vessel     DR Melissa    Cataract     Depression     Hyperlipidemia     Hypertension     Macular degeneration     Dr Razo for injection, Jennifer for eye    S/P CABG x 2     Type 2 diabetes mellitus with circulatory disorder     Dr. Aquino       PAST SURGICAL HISTORY:  Past Surgical History:   Procedure Laterality Date    broken elbow      left    EYE SURGERY      cataract    heart bypass      2004    HERNIA REPAIR      right    ROTATOR CUFF REPAIR      right  2004       SOCIAL HISTORY:  Social History     Social History    Marital status:      Spouse name: N/A    Number of children: 4    Years of education: GED     Occupational History    retired tug       Social History Main Topics    Smoking status: Former Smoker     Packs/day: 3.00     Years: 45.00     Types: Cigarettes     Quit date: 4/20/2004    Smokeless tobacco: Never Used    Alcohol use Yes      Comment: was heavy drinker 35 years ago,  "rare use now    Drug use: No    Sexual activity: Yes     Partners: Female     Other Topics Concern    Not on file     Social History Narrative    No narrative on file       FAMILY HISTORY:  Family History   Problem Relation Age of Onset    Arthritis Mother     Diabetes Mother     Stroke Mother     Heart attack Father     Cancer Brother     Throat cancer Brother     Diabetes Maternal Aunt     Diabetes Maternal Uncle     Heart disease Maternal Uncle     Diabetes Paternal Aunt     Heart disease Paternal Aunt     Heart disease Paternal Uncle     Heart disease Maternal Grandmother     Cancer Maternal Grandfather        ALLERGIES AND MEDICATIONS: updated and reviewed.  Review of patient's allergies indicates:   Allergen Reactions    Codeine Nausea Only     weak     Medication List with Changes/Refills   Current Medications    ALBUTEROL 90 MCG/ACTUATION INHALER    Inhale 2 puffs into the lungs every 6 (six) hours as needed for Wheezing or Shortness of Breath.    ASPIRIN (ECOTRIN) 81 MG EC TABLET    Take 81 mg by mouth. 1 Tablet, Delayed Release (E.C.) Oral Every day    BD INSULIN PEN NEEDLE UF SHORT 31 GAUGE X 5/16" NDLE        BD INSULIN SYRINGE ULTRA-FINE 1/2 ML 31 GAUGE X 15/64" SYRG        CARVEDILOL (COREG) 25 MG TABLET    Take 25 mg by mouth. 1 Tablet Oral Twice a day     CINNAMON BARK 500 MG CAPSULE    Take 1,000 mg by mouth once daily.      CIPRODEX 0.3-0.1 % DRPS    PLACE 3 DROP(S) IN AFFECTED EAR(S) 3 TIMES A DAY    CLOPIDOGREL (PLAVIX) 75 MG TABLET    Take 75 mg by mouth once daily.    CLOTRIMAZOLE-BETAMETHASONE 1-0.05% (LOTRISONE) CREAM        DIAZEPAM (VALIUM) 2 MG TABLET    Take 2 mg by mouth continuous prn.    DULOXETINE (CYMBALTA) 30 MG CAPSULE    Take 1 capsule (30 mg total) by mouth once daily.    FUROSEMIDE (LASIX) 40 MG TABLET        GABAPENTIN (NEURONTIN) 300 MG CAPSULE    TAKE 2 CAPSULES BY MOUTH 3 TIMES DAILY    GLIMEPIRIDE (AMARYL) 4 MG TABLET    Take 8 mg by mouth daily with " "breakfast.     HYDROCODONE-ACETAMINOPHEN 5-325MG (NORCO) 5-325 MG PER TABLET    Take 1 tablet by mouth every 6 (six) hours as needed for Pain.    INSULIN NPH-INSULIN REGULAR, 70/30, (NOVOLIN 70/30) 100 UNIT/ML (70-30) INJECTION    Inject into the skin. Inject 5 units at breakfast and inject 10 units at night    INSULIN SYRINGE-NEEDLE U-100 1/2 ML 31 X 5/16" SYRG        KETOCONAZOLE (NIZORAL) 2 % CREAM    APPLY TO AFFECTED AREAS ON TOENAILS TWICE DAILY    LOSARTAN (COZAAR) 100 MG TABLET    Take 1 tablet (100 mg total) by mouth once daily.    METFORMIN (GLUCOPHAGE) 500 MG TABLET    Take 1,000 mg by mouth 2 (two) times daily with meals. 2 Tablets in the morning, 2 Tablets in the evening    NITROGLYCERIN (NITROSTAT) 0.3 MG SL TABLET    PLACE 1 TABLET UNDER TONGUE AS NEEDED FOR CHEST PAIN EVERY 5 MINUTES, UP TO 3 DOSES IN 15MINUTES    OM-3/DHA/EPA/FISH OIL/VIT D3 (FISH OIL-VIT D3 ORAL)    Take 2,000 Units by mouth once daily.     PRAVASTATIN (PRAVACHOL) 20 MG TABLET    Take 20 mg by mouth once daily.    SPIRONOLACTONE (ALDACTONE) 25 MG TABLET    25 mg once daily.     VIT C/VIT E AC/LUT/COPPER/ZINC (PRESERVISION LUTEIN ORAL)    Take by mouth.    WARFARIN (COUMADIN) 5 MG TABLET    Take 2 tabs by mouth on Tuesday and on all other days .5 tabs by mouth            REVIEW OF SYSTEMS:  General ROS: negative for - chills, fever or malaise  Psychological ROS: negative for - memory difficulties, obsessive thoughts or suicidal ideation  Ophthalmic ROS: positive for - very poor vision (chronic)  negative for - eye pain  ENT ROS: negative for - epistaxis, oral lesions or sore throat  Allergy and Immunology ROS: negative for - hives  Hematological and Lymphatic ROS: negative for - swollen lymph nodes  Endocrine ROS: negative for - polydipsia/polyuria or temperature intolerance  Respiratory ROS: no cough, shortness of breath, or wheezing  Cardiovascular ROS: no chest pain or dyspnea on exertion  Gastrointestinal ROS: no abdominal pain, " "change in bowel habits, or black or bloody stools  Dermatological ROS: negative for mole changes and rash  Musculoskeletal ROS: negative for - joint swelling or muscle pain  Neurological ROS: no TIA or stroke symptoms  Genito-Urinary ROS: no dysuria, trouble voiding, or hematuria        PHYSICAL EXAM:  Vitals:    07/12/17 0946   BP: 132/78   Pulse: 62   Temp: 97.8 °F (36.6 °C)     Weight: 80.7 kg (177 lb 12.8 oz)   Height: 5' 8" (172.7 cm)   Body mass index is 27.03 kg/m².    Physical Examination: General appearance - alert, well appearing, and in no distress and overweight  Mental status - alert, oriented to person, place, and time, normal mood, behavior, speech, dress, motor activity, and thought processes  Eyes - sclera anicteric  Mouth - mucous membranes moist, pharynx normal without lesions  Neck - supple, no significant adenopathy, carotids upstroke normal bilaterally, no bruits, thyroid exam: thyroid is normal in size without nodules or tenderness  Lymphatics - no palpable lymphadenopathy  Chest - clear to auscultation, no wheezes, rales or rhonchi, symmetric air entry  Heart - normal rate and regular rhythm, no gallops noted  Neurological - alert, oriented, normal speech, no focal findings or movement disorder noted, cranial nerves II through XII intact  Musculoskeletal - no muscular tenderness noted, Mild-Moderate osteoarthritic changes noted to both knee joints. No joint effusions noted.   Extremities - no pedal edema noted  Skin - normal coloration and turgor, no rashes, no suspicious skin lesions noted       ASSESSMENT AND PLAN:  1. controlled type 2 diabetes mellitus with retinopathy, with long-term current use of insulin, macular edema presence unspecified, unspecified laterality, unspecified retinopathy severity/2. Type 2 diabetes, controlled, with neuropathy/3. Type 2 diabetes, controlled, with peripheral circulatory disorder/4. 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 Insulin long-term use  Diabetes currently is controlled. We discussed diabetic diet and regular exercise. We discussed home blood sugar monitoring, if appropriate. The current medical regimen is effective;  continue present plan and medications.  He is up-to-date on his eye exam.  Neuropathy pain controlled.  No claudication symptoms today.     6. Essential hypertension/7. Paroxysmal atrial fibrillation/8. Chronic stable angina  Discussed sodium restriction, maintaining ideal body weight and regular exercise program as physiologic means to achieve blood pressure control. The patient will strive towards this. The current medical regimen is effective;  continue present plan and medications. Recommended patient to check home readings to monitor and see me for followup as scheduled or sooner as needed. Patient was educated that both decongestant and anti-inflammatory medication may raise blood pressure.   CHADS2 for A-FIB Stroke Risk  Age?: 65-74 years old  CHF history: No  HTN history: Yes  Previous Stroke Sx or TIA: No  Vascular Disease History?: Yes  Diabetes Mellitus History: Yes  Female?: No  Total Score: 4    He is on Coumadin for anticoagulation.  He is followed by cardiology.    9. Hyperlipidemia LDL goal <100  We discussed low fat diet and regular exercise.The current medical regimen is effective;  continue present plan and medications.      10. Chronic obstructive pulmonary disease, unspecified COPD type  Stable/asymptomatic.  Observe.    11. Atherosclerosis of abdominal aorta  Patient with Atherosclerosis of the Aorta.  Stable/asymptomatic. Currently stable on lipid lowering medication and b/p monitoring.     12. Colon cancer screening    - Occult blood x 1, stool; Future  - Occult blood x 1, stool; Future  - Occult blood x 1, stool; Future          Return in about 6 months (around 1/12/2018), or if symptoms worsen or fail to improve, for follow up chronic medical conditions.. or sooner as  needed.

## 2017-07-12 NOTE — PATIENT INSTRUCTIONS
Recommend lotions: eucerin, aquaphor, A&D ointment, gold bond for diabetics, sween    Shoe recommendations: (try 6pm.com, zappos.com , nordstromrack.com, or shoes.com for discounted prices) you can visit DSW shoes in [place] as well    Asics (GT 1000 or gel foundations), new balance, saucony (stabil c3),  Mahoney (transcend), vionic, propet (tennis shoe)    soft brand, clarks, crocs, aerosoles, naturalizers, SAS, ecco, dean, ngoc lara, rockports (dress shoes)    Vionic, volitiles, burkenstocks, fitflops, propet (sandals)    Nike comfort thong sandals, crocs (house shoes)    Nail Home remedy:  Vicks Vapor rub       Wearing Proper Shoes                    You walk on your feet every day, forcing them to support the weight of your body. Repeated stress on your feet can cause damage over time. The right shoes can help protect your feet. The wrong shoes can cause more foot problems. Read the information below to help you find a shoe that fits your foot needs.     A good shoe fit will cover your foot outline.       A shoe that doesnt cover the outline is a bad fit.      Whats your foot shape?  To get a good fit, you need to know the shape of your foot. Do this simple test: While standing, place your foot on a piece of paper and trace around it. Is your foot straight or curved? Do you have a foot problem, such as a bunion, that causes your foot outline to show a bulge on the side of your big toe?  Finding your fit  Bring your foot outline to the shoe store to help you find the right shoe. Place a shoe you like on top of the outline to see if it matches the shape. The shoe should cover the outline. (If you have a bunion, the shoe may not cover the bulge on the outline. Look for soft leather shoes to stretch over the bunion.) Once youve found a pair of proper shoes, put them on. Walk around. Be sure the shoes dont rub or pinch. If the shoes feel good, youve found your fit!  The right shoe for you  A good shoe has  features that provide comfort and support. It must also be the right size and shape for your feet. Look for a shoe made of breathable fabric and lining, such as leather or canvas. Be sure that shoes have enough tread to prevent slipping. Go to a good shoe store for help finding the right shoe.  Good shoe features  An ideal shoe has the following:  · Laces for support. If tying laces is a problem for you, try shoes with Velcro fasteners or roland.  · A front of the shoe (toe box) with ½ inch space in front of your longest toes.  · An arch shape that supports your foot.  · No more than 1½ inches of heel.  · A stiff, snug back of the shoe to keep your foot from sliding around.  · A smooth lining with no rough seams.  Shoe shopping tips  Below are some dos and donts for when you go to the shoe store.  Do:  · Select the shoes that feel right. Wear them around the house. Then bring them to your foot healthcare provider to check for fit. If they dont fit well, return them.  · Shop late in the day, when your feet will be slightly bigger.  · Each time you buy shoes, have both your feet measured while you are standing. Foot size changes with time.  · Pick shoes to suit their purpose. High heels are OK for an occasional night on the town. But for everyday wear, choose a more sensible shoe.  · Try on shoes while wearing any inserts specially made for your feet (orthoses).  · Try on both the right and left shoes. If your feet are different sizes, pick a pair that fits the larger foot.  Dont:  · Dont buy shoes based on shoe size alone. Always try on shoes, as sizes differ from brand to brand and within brands.  · Dont expect shoes to break in. If they dont fit at the store, dont buy them.  · Dont buy a shoe that doesnt match your foot shape.  What about socks?  Always wear socks with shoes. Socks help absorb sweat and reduce friction and blistering. When shopping for shoes, choose soft, padded socks with seams that  dont irritate your feet.  If you have foot problems  Some foot problems cause deformities. This can make it hard to find a good fit. Look for shoes made of soft leather to stretch over the deformity. If you have bunions, buy shoes with a wider toe box. To fit hammertoes, look for shoes with a tall toe box. If you have arch problems, you may need inserts. In some cases, youll need to have custom footwear or orthoses made for your feet.  Suggested footwear  Ask your healthcare provider what kind of footwear you need. He or she may recommend a certain brand or shoe store.  Date Last Reviewed: 8/1/2016  © 0076-9612 [employer]. [address] [place] [address], [place] [address]. All rights reserved. This information is not intended as a substitute for professional medical care. Always follow your healthcare professional's instructions.            Understanding Peripheral Arterial Disease    Peripheral arteries deliver oxygen-rich blood to the tissues outside the heart. As you age, your arteries become stiffer and thicker. In addition, risk factors, such as smoking and high cholesterol, can damage the artery lining. This allows a buildup of fat and other materials (plaque) to form within the artery walls. The buildup of plaque narrows the space inside the artery and sometimes blocks blood flow. Peripheral arterial disease (PAD) happens when blood flow through the arteries is reduced because of plaque buildup. It often happens in the legs and feet, but can also happen elsewhere in the body. If this buildup happens in the a large artery in the neck (carotid artery), it can be a major contributor to stroke.  A healthy artery  An artery is a muscular tube that carries oxgen rich blood and nutrients from the heart to the rest of the body. It has a smooth lining and flexible walls that allow blood to pass freely. When active, muscles need more oxygen. This increases blood flow. Healthy arteries can adapt to meet this  need.  A damaged artery    PAD begins when the lining of an artery is damaged. This is often because of risk factors, such as smoking, older age, or diabetes. Plaque then starts to form within the artery wall. At this stage, blood flows normally, so youre not likely to have symptoms.  A narrowed artery    If plaque continues to build up, the space inside the artery narrows. The artery walls become less able to expand. The artery still provides enough blood and oxygen to your muscles during rest. But when youre active, the increased demand for blood cant be met. As a result, your leg may cramp or ache when you walk.  A blocked artery    An artery can become blocked by plaque or by a blood clot lodged in a narrowed section. When this happens, oxygen cant reach the muscle below the blockage. Then you may feel pain when lying down or when you are not active (rest pain). This type of pain is especially common at night when youre lying flat. In time, the affected tissue can die. This can lead to the loss of a toe or foot.  Date Last Reviewed: 5/1/2016  © 0011-1018 [employer]. [address] [place] [address]. All rights reserved. This information is not intended as a substitute for professional medical care. Always follow your healthcare professional's instructions.

## 2017-07-17 ENCOUNTER — TELEPHONE (OUTPATIENT)
Dept: FAMILY MEDICINE | Facility: CLINIC | Age: 77
End: 2017-07-17

## 2017-07-17 NOTE — TELEPHONE ENCOUNTER
I called pt back and he was just calling to let you know that he is going to just keep seeing his endo doctor.Thanks

## 2017-07-17 NOTE — TELEPHONE ENCOUNTER
----- Message from Allyssa Groves sent at 7/17/2017 10:37 AM CDT -----  Contact: self  [phone]  Pt is requesting to speak to you , he wants to stay with the Endocrinologist he has. Pls call pt [phone]. Thanks........DEANNA

## 2017-07-20 DIAGNOSIS — J44.1 COPD EXACERBATION: ICD-10-CM

## 2017-07-20 RX ORDER — ALBUTEROL SULFATE 90 UG/1
2 AEROSOL, METERED RESPIRATORY (INHALATION) EVERY 6 HOURS PRN
Qty: 18 G | Refills: 0 | Status: SHIPPED | OUTPATIENT
Start: 2017-07-20 | End: 2017-12-14 | Stop reason: SDUPTHER

## 2017-07-20 NOTE — TELEPHONE ENCOUNTER
----- Message from Ricky Mesa sent at 7/20/2017 11:22 AM CDT -----  Contact: Eyad  REFILL: albuterol 90 mcg/actuation inhaler  Flonase  #30 day supply    Please send to [facility]

## 2017-08-30 ENCOUNTER — TELEPHONE (OUTPATIENT)
Dept: FAMILY MEDICINE | Facility: CLINIC | Age: 77
End: 2017-08-30

## 2017-08-30 DIAGNOSIS — Z12.11 COLON CANCER SCREENING: Primary | ICD-10-CM

## 2017-08-30 NOTE — TELEPHONE ENCOUNTER
----- Message from Ricky Mesa sent at 8/30/2017  1:49 PM CDT -----  Contact: Self  Pt called regarding colonoscopy. Pt can be reached @ [phone].

## 2017-08-30 NOTE — TELEPHONE ENCOUNTER
Spoke w/patient, requesting to schedule a colonoscopy. States he tried to complete the stool cards but his stool is constantly liquid no matter what he eat. States he prefers to have done on the WB. Please advise.

## 2017-08-31 NOTE — TELEPHONE ENCOUNTER
Spoke w/patient, informed order placed for Colonoscopy and will be contacted to schedule verbalized understanding..

## 2017-09-08 DIAGNOSIS — Z12.11 COLON CANCER SCREENING: Primary | ICD-10-CM

## 2017-09-11 ENCOUNTER — TELEPHONE (OUTPATIENT)
Dept: FAMILY MEDICINE | Facility: CLINIC | Age: 77
End: 2017-09-11

## 2017-09-11 NOTE — TELEPHONE ENCOUNTER
Spoke to Leilani with [facility]. States she received a call from patient stating that he was informed at scheduling of his colonoscopy to stop his coumadin 5-7 prior to procedure. States do  advise this since they monitor the patient's coumadin. Please advise.

## 2017-09-11 NOTE — TELEPHONE ENCOUNTER
[facility] for [facility]-fib and that is why he is on coumadin. His cardiologist with the heart clinic needs to let us know if he can stop the coumadin to have his colonoscopy done.

## 2017-09-11 NOTE — TELEPHONE ENCOUNTER
----- Message from Sapna Batista sent at 9/11/2017 11:31 AM CDT -----  Contact: [facility] called concerning a clearance for pt's colonoscopy & discuss meds. Please advise. [phone]

## 2017-09-13 ENCOUNTER — OFFICE VISIT (OUTPATIENT)
Dept: PODIATRY | Facility: CLINIC | Age: 77
End: 2017-09-13
Payer: MEDICARE

## 2017-09-13 VITALS — HEIGHT: 68 IN | BODY MASS INDEX: 26.83 KG/M2 | WEIGHT: 177 LBS

## 2017-09-13 DIAGNOSIS — L84 CORN OR CALLUS: ICD-10-CM

## 2017-09-13 DIAGNOSIS — B35.1 ONYCHOMYCOSIS DUE TO DERMATOPHYTE: ICD-10-CM

## 2017-09-13 DIAGNOSIS — M20.42 HAMMER TOES OF BOTH FEET: ICD-10-CM

## 2017-09-13 DIAGNOSIS — E11.49 TYPE II DIABETES MELLITUS WITH NEUROLOGICAL MANIFESTATIONS: ICD-10-CM

## 2017-09-13 DIAGNOSIS — M20.41 HAMMER TOES OF BOTH FEET: ICD-10-CM

## 2017-09-13 PROCEDURE — 99999 PR PBB SHADOW E&M-EST. PATIENT-LVL III: CPT | Mod: PBBFAC,,, | Performed by: PODIATRIST

## 2017-09-13 PROCEDURE — 11721 DEBRIDE NAIL 6 OR MORE: CPT | Mod: 59,Q9,S$GLB, | Performed by: PODIATRIST

## 2017-09-13 PROCEDURE — 11056 PARNG/CUTG B9 HYPRKR LES 2-4: CPT | Mod: Q9,S$GLB,, | Performed by: PODIATRIST

## 2017-09-13 PROCEDURE — 99499 UNLISTED E&M SERVICE: CPT | Mod: S$PBB,,, | Performed by: PODIATRIST

## 2017-09-13 PROCEDURE — 99499 UNLISTED E&M SERVICE: CPT | Mod: S$GLB,,, | Performed by: PODIATRIST

## 2017-09-13 NOTE — PROGRESS NOTES
Subjective:      Patient ID: Percy Ramirez is a 76 y.o. male.    Chief Complaint: Diabetes Mellitus (pcp Dr. Edmondson 7/12/17); Diabetic Foot Exam; and Nail Care    Percy is a 76 y.o. male who presents to the clinic for evaluation and treatment of high risk feet. Percy has a past medical history of Allergy; Arthritis; CAD, multiple vessel; Cataract; Depression; Hyperlipidemia; Hypertension; Macular degeneration; S/P CABG x 2; and Type 2 diabetes mellitus with circulatory disorder. The patient's chief complaint is elongated, thickened toenails aggravated by increased weight bearing, shoe gear, pressure.    Patient admits to barefoot walking often in and around home    This patient has documented high risk feet requiring routine maintenance secondary to diabetes mellitis and those secondary complications of diabetes, as mentioned..    PCP: Kasie Edmondson MD    Date Last Seen by PCP:   Chief Complaint   Patient presents with    Diabetes Mellitus     pcp Dr. Edmondson 7/12/17    Diabetic Foot Exam    Nail Care     Current shoe gear:  rx shoes (oxfords)    Hemoglobin A1C   Date Value Ref Range Status   10/22/2013 7.4 (H) 4.5 - 6.2 % Final     Patient Active Problem List   Diagnosis    Major depressive disorder, recurrent episode, mild    Hypertension    Type 2 diabetes, uncontrolled, with retinopathy    CAD, multiple vessel    S/P CABG x 2    Macular degeneration    Obstructive sleep apnea on CPAP    Anxiety state, unspecified    Insulin long-term use    Primary osteoarthritis of both knees    Chronic low back pain    DJD (degenerative joint disease), lumbar    Type 2 diabetes, uncontrolled, with neuropathy    Bilateral carotid artery disease    Hyperlipidemia LDL goal <100    Type 2 diabetes, uncontrolled, with peripheral circulatory disorder    COPD (chronic obstructive pulmonary disease)    Atherosclerosis of abdominal aorta    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 18 g 0    aspirin (ECOTRIN) 81 MG EC tablet Take 81 mg by mouth. 1 Tablet, Delayed Release (E.C.) Oral Every day      BD INSULIN PEN NEEDLE UF SHORT 31 gauge x 5/16" Ndle       BD INSULIN SYRINGE ULTRA-FINE 1/2 mL 31 gauge x 15/64" Syrg       carvedilol (COREG) 25 MG tablet Take 25 mg by mouth. 1 Tablet Oral Twice a day       cinnamon bark 500 mg capsule Take 1,000 mg by mouth once daily.        CIPRODEX 0.3-0.1 % DrpS PLACE 3 DROP(S) IN AFFECTED EAR(S) 3 TIMES A DAY  0    clopidogrel (PLAVIX) 75 mg tablet Take 75 mg by mouth once daily.      clotrimazole-betamethasone 1-0.05% (LOTRISONE) cream       diazepam (VALIUM) 2 MG tablet Take 2 mg by mouth continuous prn.      duloxetine (CYMBALTA) 30 MG capsule Take 1 capsule (30 mg total) by mouth once daily. 30 capsule 10    furosemide (LASIX) 40 MG tablet       gabapentin (NEURONTIN) 300 MG capsule TAKE 2 CAPSULES BY MOUTH 3 TIMES DAILY 540 capsule 1    glimepiride (AMARYL) 4 MG tablet Take 8 mg by mouth daily with breakfast.       hydrocodone-acetaminophen 5-325mg (NORCO) 5-325 mg per tablet Take 1 tablet by mouth every 6 (six) hours as needed for Pain. 30 tablet 0    insulin NPH-insulin regular, 70/30, (NOVOLIN 70/30) 100 unit/mL (70-30) injection Inject into the skin. Inject 5 units at breakfast and inject 10 units at night      insulin syringe-needle U-100 1/2 mL 31 x 5/16" Syrg       ketoconazole (NIZORAL) 2 % cream APPLY TO AFFECTED AREAS ON TOENAILS TWICE DAILY  2    losartan (COZAAR) 100 MG tablet Take 1 tablet (100 mg total) by mouth once daily. 90 tablet 3    metformin (GLUCOPHAGE) 500 MG tablet " Take 1,000 mg by mouth 2 (two) times daily with meals. 2 Tablets in the morning, 2 Tablets in the evening      nitroGLYCERIN (NITROSTAT) 0.3 MG SL tablet PLACE 1 TABLET UNDER TONGUE AS NEEDED FOR CHEST PAIN EVERY 5 MINUTES, UP TO 3 DOSES IN 15MINUTES  2    OM-3/DHA/EPA/FISH OIL/VIT D3 (FISH OIL-VIT D3 ORAL) Take 2,000 Units by mouth once daily.       pravastatin (PRAVACHOL) 20 MG tablet Take 20 mg by mouth once daily.      spironolactone (ALDACTONE) 25 MG tablet 25 mg once daily.       VIT C/VIT E AC/LUT/COPPER/ZINC (PRESERVISION LUTEIN ORAL) Take by mouth.      warfarin (COUMADIN) 5 MG tablet Take 2 tabs by mouth on Tuesday and on all other days .5 tabs by mouth       No current facility-administered medications on file prior to visit.      Review of patient's allergies indicates:   Allergen Reactions    Codeine Nausea Only     weak     Past Surgical History:   Procedure Laterality Date    broken elbow      left    EYE SURGERY      cataract    heart bypass      2004    HERNIA REPAIR      right    ROTATOR CUFF REPAIR      right  2004     Family History   Problem Relation Age of Onset    Arthritis Mother     Diabetes Mother     Stroke Mother     Heart attack Father     Cancer Brother     Throat cancer Brother     Diabetes Maternal Aunt     Diabetes Maternal Uncle     Heart disease Maternal Uncle     Diabetes Paternal Aunt     Heart disease Paternal Aunt     Heart disease Paternal Uncle     Heart disease Maternal Grandmother     Cancer Maternal Grandfather      Social History     Social History    Marital status:      Spouse name: N/A    Number of children: 4    Years of education: GED     Occupational History    retired tug       Social History Main Topics    Smoking status: Former Smoker     Packs/day: 3.00     Years: 45.00     Types: Cigarettes     Quit date: 4/20/2004    Smokeless tobacco: Never Used    Alcohol use Yes      Comment: was heavy drinker 35 years ago,  "rare use now    Drug use: No    Sexual activity: Yes     Partners: Female     Other Topics Concern    Not on file     Social History Narrative    No narrative on file     Review of Systems   Constitution: Negative for chills, fever and weakness.   Cardiovascular: Negative for claudication and leg swelling.   Respiratory: Positive for cough. Negative for shortness of breath.    Skin: Positive for dry skin and nail changes. Negative for itching and rash.   Musculoskeletal: Positive for arthritis, back pain and joint pain. Negative for falls, joint swelling and muscle weakness.   Gastrointestinal: Negative for diarrhea, nausea and vomiting.   Neurological: Positive for numbness and paresthesias. Negative for tremors.   Psychiatric/Behavioral: Negative for altered mental status and hallucinations.           Objective:       Vitals:    09/13/17 0923   Weight: 80.3 kg (177 lb)   Height: 5' 8" (1.727 m)   PainSc: 0-No pain       Physical Exam   Constitutional:   General: Pt. is well-developed, well-nourished, appears stated age, in no acute distress, alert and oriented x 3. No evidence of depression, anxiety, or agitation. Calm, cooperative, and communicative. Appropriate interactions and affect.       Cardiovascular:   Pulses:       Dorsalis pedis pulses are 2+ on the right side, and 2+ on the left side.        Posterior tibial pulses are 1+ on the right side, and 1+ on the left side.   There is decreased digital hair.   Musculoskeletal:        Right foot: There is normal range of motion.        Left foot:  There is normal range of motion.   Muscle strength is 5/5 in all groups bilaterally.    Decreased stride, station of gait.  apropulsive toe off.  Increased angle and base of gait.    Patient has hammertoes of digits 2-5 bilateral partially reducible without symptom today.    Visible and palpable bunion without pain at dorsomedial 1st metatarsal head right and left.  Hallux abducted right and left partially " reducible, tracks laterally without being track bound.  No ecchymosis, erythema, edema, or cardinal signs infection or signs of trauma same foot.     Neurological: A sensory deficit is present.   [place] 5.07 monofilamant testing is diminished Farhad feet. Sharp/dull sensation diminished Bilaterally   Skin: Skin is warm, dry and intact. No abrasion, no ecchymosis, no lesion and no rash noted. He is not diaphoretic. No cyanosis or erythema. No pallor. Nails show no clubbing.   Toenails 1-5 bilaterally are elongated by 2-3 mm, thickened by 2-3 mm, discolored/yellowed, dystrophic, brittle with subungual debris.    Focal hyperkeratotic lesion consisting entirely of hyperkeratotic tissue without open skin, drainage, pus, fluctuance, malodor, or signs of infection: medial IPJ of hallux farhad    Interdigital Spaces clean, dry and without evidence of break in skin integrity   Psychiatric: He has a normal mood and affect. His speech is normal.   Nursing note and vitals reviewed.        Assessment:       Encounter Diagnoses   Name Primary?    Type 2 diabetes, uncontrolled, with peripheral circulatory disorder Yes    Type II diabetes mellitus with neurological manifestations     Hammer toes of both feet     Corn or callus     Onychomycosis due to dermatophyte          Plan:       Percy was seen today for diabetes mellitus, diabetic foot exam and nail care.    Diagnoses and all orders for this visit:    Type 2 diabetes, uncontrolled, with peripheral circulatory disorder    Type II diabetes mellitus with neurological manifestations    Hammer toes of both feet    Corn or callus    Onychomycosis due to dermatophyte      I counseled the patient on his conditions, their implications and medical management.      - Shoe inspection. Diabetic Foot Education. Patient reminded of the importance of good nutrition and blood sugar control to help prevent podiatric complications of diabetes. Patient instructed on proper foot  hygeine. We discussed wearing proper shoe gear, daily foot inspections, never walking without protective shoe gear, never putting sharp instruments to feet    - With patient's permission, nails were aggressively reduced and debrided x 10 to their soft tissue attachment mechanically and with electric , removing all offending nail and debris. Patient relates relief following the procedure.     - After cleansing the  area w/ alcohol prep pad the above mentioned hyperkeratosis was trimmed utilizing No 15 scapel, to a smooth base with out incident. Patient tolerated this  well and reported comfort to the area of medial hallux IPJ concha    - He will continue to monitor the areas daily, inspect his feet, wear protective shoe gear when ambulatory, moisturizer to maintain skin integrity and follow in this office in approximately 60-70 days, sooner PRN

## 2017-09-13 NOTE — PATIENT INSTRUCTIONS
Recommend lotions: eucerin, aquaphor, A&D ointment, gold bond for diabetics, sween; urea 40 with aloe (found on amazon.com)    Shoe recommendations: (try 6pm.com, zappos.com , nordstromrack.com, or shoes.com for discounted prices) you can visit DSW shoes in [place] as well    Asics (GT 2000 or gel foundations), new balance, saucony (stabil c3),  Mahoney (GTS or Beast or transcend), vionic, propet (tennis shoe)    sofft brand, clarks, crocs, aerosoles, naturalizers, SAS, ecco, dean, ngoc lara, rockports (dress shoes)    Vionic, burkenstocks, fitflops, propet (sandals)    Nike comfort thong sandals, crocs, propet (house shoes)    Nail Home remedy:  Vicks Vapor rub to nails for easier managability    Occasional soaks for 15-20 mins in luke warm water with 1 cup of listerine and 1 cup of apple cider vinegar are ok You may add several drops of oil of oregano or tea tree oil as well          Diabetes: Inspecting Your Feet  Diabetes increases your chances of developing foot problems. So inspect your feet every day. This helps you find small skin irritations before they become serious infections. If you have trouble seeing the bottoms of your feet, use a mirror or ask a family member or friend to help.     Pressure spots on the bottom of the foot are common areas where problems develop.   How to check your feet  Below are tips to help you look for foot problems. Try to check your feet at the same time each day, such as when you get out of bed in the morning:  · Check the top of each foot. The tops of toes, back of the heel, and outer edge of the foot can get a lot of rubbing from poor-fitting shoes.  · Check the bottom of each foot. Daily wear and tear often leads to problems at pressure spots.  · Check the toes and nails. Fungal infections often occur between toes. Toenail problems can also be a sign of fungal infections or lead to breaks in the skin.  · Check your shoes, too. Loose objects inside a shoe can injure the  foot. Use your hand to feel inside your shoes for things like alfredo, loose stitching, or rough areas that could irritate your skin.  Warning signs  Look for any color changes in the foot. Redness with streaks can signal a severe infection, which needs immediate medical attention. Tell your doctor right away if you have any of these problems:  · Swelling, sometimes with color changes, may be a sign of poor blood flow or infection. Symptoms include tenderness and an increase in the size of your foot.  · Warm or hot areas on your feet may be signs of infection. A foot that is cold may not be getting enough blood.  · Sensations such as burning, tingling, or pins and needles can be signs of a problem. Also check for areas that may be numb.  · Hot spots are caused by friction or pressure. Look for hot spots in areas that get a lot of rubbing. Hot spots can turn into blisters, calluses, or sores.  · Cracks and sores are caused by dry or irritated skin. They are a sign that the skin is breaking down, which can lead to infection.  · Toenail problems to watch for include nails growing into the skin (ingrown toenail) and causing redness or pain. Thick, yellow, or discolored nails can signal a fungal infection.  · Drainage and odor can develop from untreated sores and ulcers. Call your doctor right away if you notice white or yellow drainage, bleeding, or unpleasant odor.   © 2749-8547 [employer]. [address] [place] [address], [place] [address]. All rights reserved. This information is not intended as a substitute for professional medical care. Always follow your healthcare professional's instructions.

## 2017-09-14 NOTE — TELEPHONE ENCOUNTER
Spoke w/Leilani OLIVARES, informed of recommendation per . States she will contact the surgeon performing the colonoscopy.

## 2017-09-25 ENCOUNTER — TELEPHONE (OUTPATIENT)
Dept: FAMILY MEDICINE | Facility: CLINIC | Age: 77
End: 2017-09-25

## 2017-10-03 ENCOUNTER — ANESTHESIA EVENT (OUTPATIENT)
Dept: ENDOSCOPY | Facility: HOSPITAL | Age: 77
End: 2017-10-03
Payer: MEDICARE

## 2017-10-03 RX ORDER — LIDOCAINE HYDROCHLORIDE 10 MG/ML
1 INJECTION, SOLUTION EPIDURAL; INFILTRATION; INTRACAUDAL; PERINEURAL ONCE
Status: CANCELLED | OUTPATIENT
Start: 2017-10-03 | End: 2017-10-03

## 2017-10-04 ENCOUNTER — HOSPITAL ENCOUNTER (OUTPATIENT)
Facility: HOSPITAL | Age: 77
Discharge: HOME | End: 2017-10-04
Attending: INTERNAL MEDICINE | Admitting: INTERNAL MEDICINE
Payer: MEDICARE

## 2017-10-04 ENCOUNTER — ANESTHESIA (OUTPATIENT)
Dept: ENDOSCOPY | Facility: HOSPITAL | Age: 77
End: 2017-10-04
Payer: MEDICARE

## 2017-10-04 VITALS
OXYGEN SATURATION: 96 % | HEART RATE: 61 BPM | WEIGHT: 170 LBS | BODY MASS INDEX: 25.76 KG/M2 | RESPIRATION RATE: 18 BRPM | DIASTOLIC BLOOD PRESSURE: 75 MMHG | HEIGHT: 68 IN | TEMPERATURE: 99 F | SYSTOLIC BLOOD PRESSURE: 165 MMHG

## 2017-10-04 DIAGNOSIS — Z12.11 COLON CANCER SCREENING: Primary | ICD-10-CM

## 2017-10-04 LAB — POCT GLUCOSE: 163 MG/DL (ref 70–110)

## 2017-10-04 PROCEDURE — 25000003 PHARM REV CODE 250: Performed by: ANESTHESIOLOGY

## 2017-10-04 PROCEDURE — 27201089 HC SNARE, DISP (ANY): Performed by: INTERNAL MEDICINE

## 2017-10-04 PROCEDURE — 45385 COLONOSCOPY W/LESION REMOVAL: CPT | Performed by: INTERNAL MEDICINE

## 2017-10-04 PROCEDURE — D9220A PRA ANESTHESIA: Mod: PT,ANES,, | Performed by: ANESTHESIOLOGY

## 2017-10-04 PROCEDURE — D9220A PRA ANESTHESIA: Mod: PT,CRNA,, | Performed by: NURSE ANESTHETIST, CERTIFIED REGISTERED

## 2017-10-04 PROCEDURE — 63600175 PHARM REV CODE 636 W HCPCS: Performed by: NURSE ANESTHETIST, CERTIFIED REGISTERED

## 2017-10-04 PROCEDURE — 37000008 HC ANESTHESIA 1ST 15 MINUTES: Performed by: INTERNAL MEDICINE

## 2017-10-04 PROCEDURE — 88305 TISSUE EXAM BY PATHOLOGIST: CPT | Mod: 26,,, | Performed by: PATHOLOGY

## 2017-10-04 PROCEDURE — 82962 GLUCOSE BLOOD TEST: CPT | Performed by: INTERNAL MEDICINE

## 2017-10-04 PROCEDURE — 37000009 HC ANESTHESIA EA ADD 15 MINS: Performed by: INTERNAL MEDICINE

## 2017-10-04 PROCEDURE — 88305 TISSUE EXAM BY PATHOLOGIST: CPT | Performed by: PATHOLOGY

## 2017-10-04 RX ORDER — LIDOCAINE HCL/PF 100 MG/5ML
SYRINGE (ML) INTRAVENOUS
Status: DISCONTINUED | OUTPATIENT
Start: 2017-10-04 | End: 2017-10-04

## 2017-10-04 RX ORDER — SODIUM CHLORIDE 9 MG/ML
INJECTION, SOLUTION INTRAVENOUS CONTINUOUS
Status: DISCONTINUED | OUTPATIENT
Start: 2017-10-04 | End: 2017-10-04 | Stop reason: HOSPADM

## 2017-10-04 RX ORDER — LIDOCAINE HYDROCHLORIDE 20 MG/ML
INJECTION, SOLUTION EPIDURAL; INFILTRATION; INTRACAUDAL; PERINEURAL
Status: DISCONTINUED
Start: 2017-10-04 | End: 2017-10-04 | Stop reason: HOSPADM

## 2017-10-04 RX ORDER — PROPOFOL 10 MG/ML
VIAL (ML) INTRAVENOUS
Status: DISCONTINUED | OUTPATIENT
Start: 2017-10-04 | End: 2017-10-04

## 2017-10-04 RX ORDER — PROPOFOL 10 MG/ML
VIAL (ML) INTRAVENOUS
Status: DISCONTINUED
Start: 2017-10-04 | End: 2017-10-04 | Stop reason: HOSPADM

## 2017-10-04 RX ADMIN — SODIUM CHLORIDE: 0.9 INJECTION, SOLUTION INTRAVENOUS at 09:10

## 2017-10-04 RX ADMIN — LIDOCAINE HYDROCHLORIDE 50 MG: 20 INJECTION, SOLUTION INTRAVENOUS at 10:10

## 2017-10-04 RX ADMIN — PROPOFOL 30 MG: 10 INJECTION, EMULSION INTRAVENOUS at 10:10

## 2017-10-04 RX ADMIN — PROPOFOL 20 MG: 10 INJECTION, EMULSION INTRAVENOUS at 10:10

## 2017-10-04 RX ADMIN — PROPOFOL 80 MG: 10 INJECTION, EMULSION INTRAVENOUS at 10:10

## 2017-10-04 NOTE — ANESTHESIA PREPROCEDURE EVALUATION
10/04/2017  Percy Ramirez is a 76 y.o., male.    Anesthesia Evaluation    I have reviewed the Patient Summary Reports.     I have reviewed the Medications.     Review of Systems  Anesthesia Hx:  No problems with previous Anesthesia    Social:  Former Smoker, Alcohol Use    Cardiovascular:   Hypertension CAD  CABG/stent Dysrhythmias atrial fibrillation Angina    Pulmonary:   COPD Sleep Apnea    Renal/:   Chronic Renal Disease    Musculoskeletal:   Arthritis     Endocrine:   Diabetes, poorly controlled, type 2    Psych:   Psychiatric History          Physical Exam  General:  Well nourished    Airway/Jaw/Neck:  Airway Findings: Mouth Opening: Normal Tongue: Normal  General Airway Assessment: Adult  Mallampati: II  TM Distance: Normal, at least 6 cm  Jaw/Neck Findings:  Neck ROM: Normal ROM      Dental:  Dental Findings: In tact   Chest/Lungs:  Chest/Lungs Findings: Clear to auscultation, Normal Respiratory Rate     Heart/Vascular:  Heart Findings: Rate: Normal        Mental Status:  Mental Status Findings:  Cooperative, Alert and Oriented         Anesthesia Plan  Type of Anesthesia, risks & benefits discussed:  Anesthesia Type:  general  Patient's Preference:   Intra-op Monitoring Plan: standard ASA monitors  Intra-op Monitoring Plan Comments:   Post Op Pain Control Plan: multimodal analgesia, IV/PO Opioids PRN and per primary service following discharge from PACU  Post Op Pain Control Plan Comments:   Induction:   IV  Beta Blocker:  Patient is on a Beta-Blocker and has received one dose within the past 24 hours (No further documentation required).       Informed Consent: Patient understands risks and agrees with Anesthesia plan.  Questions answered. Anesthesia consent signed with patient.  ASA Score: 3     Day of Surgery Review of History & Physical:    H&P update referred to the surgeon.         Ready For  Surgery From Anesthesia Perspective.

## 2017-10-04 NOTE — OR NURSING
Encouraged to continue passing flatus. Dr Leung discussed findings with patient and family @ bedside.

## 2017-10-04 NOTE — PRE-PROCEDURE INSTRUCTIONS
Pre-Procedure H&P:  Reason for Procedure: screen    HPI:  Pt is a 76 y.o. male screen    Past Medical History:   Diagnosis Date    Allergy     Arthritis     CAD, multiple vessel     DR Melissa    Cataract     Depression     Hyperlipidemia     Hypertension     Macular degeneration     Dr Razo for injection, Heitraji for eye    S/P CABG x 2     Type 2 diabetes mellitus with circulatory disorder     Dr. Aquino       Past Surgical History:   Procedure Laterality Date    broken elbow      left    EYE SURGERY      cataract    heart bypass      2004    HERNIA REPAIR      right    ROTATOR CUFF REPAIR      right  2004       Family History   Problem Relation Age of Onset    Arthritis Mother     Diabetes Mother     Stroke Mother     Heart attack Father     Cancer Brother     Throat cancer Brother     Diabetes Maternal Aunt     Diabetes Maternal Uncle     Heart disease Maternal Uncle     Diabetes Paternal Aunt     Heart disease Paternal Aunt     Heart disease Paternal Uncle     Heart disease Maternal Grandmother     Cancer Maternal Grandfather        Social History     Social History    Marital status:      Spouse name: N/A    Number of children: 4    Years of education: GED     Occupational History    retired tug       Social History Main Topics    Smoking status: Former Smoker     Packs/day: 3.00     Years: 45.00     Types: Cigarettes     Quit date: 4/20/2004    Smokeless tobacco: Never Used    Alcohol use Yes      Comment: was heavy drinker 35 years ago, rare use now    Drug use: No    Sexual activity: Yes     Partners: Female     Other Topics Concern    Not on file     Social History Narrative    No narrative on file       Endoscopic History:      Review of patient's allergies indicates:   Allergen Reactions    Codeine Nausea Only     weak       No current facility-administered medications on file prior to encounter.      Current Outpatient Prescriptions on File  "Prior to Encounter   Medication Sig Dispense Refill    albuterol 90 mcg/actuation inhaler Inhale 2 puffs into the lungs every 6 (six) hours as needed for Wheezing or Shortness of Breath. 18 g 0    aspirin (ECOTRIN) 81 MG EC tablet Take 81 mg by mouth. 1 Tablet, Delayed Release (E.C.) Oral Every day      carvedilol (COREG) 25 MG tablet Take 25 mg by mouth. 1 Tablet Oral Twice a day       cinnamon bark 500 mg capsule Take 1,000 mg by mouth once daily.        CIPRODEX 0.3-0.1 % DrpS PLACE 3 DROP(S) IN AFFECTED EAR(S) 3 TIMES A DAY  0    clopidogrel (PLAVIX) 75 mg tablet Take 75 mg by mouth once daily.      clotrimazole-betamethasone 1-0.05% (LOTRISONE) cream       diazepam (VALIUM) 2 MG tablet Take 2 mg by mouth continuous prn.      duloxetine (CYMBALTA) 30 MG capsule Take 1 capsule (30 mg total) by mouth once daily. 30 capsule 10    furosemide (LASIX) 40 MG tablet       gabapentin (NEURONTIN) 300 MG capsule TAKE 2 CAPSULES BY MOUTH 3 TIMES DAILY 540 capsule 1    glimepiride (AMARYL) 4 MG tablet Take 8 mg by mouth daily with breakfast.       insulin NPH-insulin regular, 70/30, (NOVOLIN 70/30) 100 unit/mL (70-30) injection Inject into the skin. Inject 5 units at breakfast and inject 10 units at night      insulin syringe-needle U-100 1/2 mL 31 x 5/16" Syrg       ketoconazole (NIZORAL) 2 % cream APPLY TO AFFECTED AREAS ON TOENAILS TWICE DAILY  2    losartan (COZAAR) 100 MG tablet Take 1 tablet (100 mg total) by mouth once daily. 90 tablet 3    metformin (GLUCOPHAGE) 500 MG tablet Take 1,000 mg by mouth 2 (two) times daily with meals. 2 Tablets in the morning, 2 Tablets in the evening      OM-3/DHA/EPA/FISH OIL/VIT D3 (FISH OIL-VIT D3 ORAL) Take 2,000 Units by mouth once daily.       pravastatin (PRAVACHOL) 20 MG tablet Take 20 mg by mouth once daily.      spironolactone (ALDACTONE) 25 MG tablet 25 mg once daily.       VIT C/VIT E AC/LUT/COPPER/ZINC (PRESERVISION LUTEIN ORAL) Take by mouth.      " "warfarin (COUMADIN) 5 MG tablet Take 2 tabs by mouth on Tuesday and on all other days .5 tabs by mouth      BD INSULIN PEN NEEDLE UF SHORT 31 gauge x 5/16" Ndle       BD INSULIN SYRINGE ULTRA-FINE 1/2 mL 31 gauge x 15/64" Syrg       hydrocodone-acetaminophen 5-325mg (NORCO) 5-325 mg per tablet Take 1 tablet by mouth every 6 (six) hours as needed for Pain. 30 tablet 0    nitroGLYCERIN (NITROSTAT) 0.3 MG SL tablet PLACE 1 TABLET UNDER TONGUE AS NEEDED FOR CHEST PAIN EVERY 5 MINUTES, UP TO 3 DOSES IN 15MINUTES  2       Current Facility-Administered Medications:     0.9%  NaCl infusion, , Intravenous, Continuous, Rosanna Mcdaniel MD, Last Rate: 10 mL/hr at 10/04/17 0946    ROS: Negative x 10    Patient Vitals for the past 24 hrs:   BP Temp Temp src Pulse Resp SpO2 Height Weight   10/04/17 0937 (!) 175/83 97.8 °F (36.6 °C) Oral 70 18 96 % 5' 8" (1.727 m) 77.1 kg (170 lb)       Gen: Well developed, well nourished, no apparent distress  HEENT: Anicteric, PERRLA  CV: S1, S2, no murmers/rubs, non-displaced PMI  Lungs: CTA-B, normal excursion  Abd: Soft, NT, ND, normal BS's, no HSM  Ext: No c/c/e, 1+ DP pulses to BLE's  Neuro: CN II-XII grossly intact, no asterixis.  Skin: No rashes/lesions.  Psych: AA&O x 4    Assessment:  Pt. Is a 76 y.o. male with:  1. screen    Recommendations:  1. Colonoscopy  2. F/U with PCP      I would like to take this opportunity to thank you for this consult.  If you have any questions or concerns, please do not hesitate to contact me.       "

## 2017-10-04 NOTE — TRANSFER OF CARE
"Anesthesia Transfer of Care Note    Patient: Percy Ramirez    Procedure(s) Performed: Procedure(s) (LRB):  COLONOSCOPY (N/A)    Patient location: ENDO RM 1.    Anesthesia Type: general    Transport from OR: Transported from OR on room air with adequate spontaneous ventilation    Post pain: adequate analgesia    Post assessment: no apparent anesthetic complications    Post vital signs: stable    Level of consciousness: awake, alert and oriented    Nausea/Vomiting: no nausea/vomiting    Complications: none    Transfer of care protocol was followed      Last vitals:   Visit Vitals  /66   Pulse 61   Temp 36 °C (96.8 °F) (Skin)   Resp 16   Ht 5' 8" (1.727 m)   Wt 77.1 kg (170 lb)   SpO2 97%   BMI 25.85 kg/m²     "

## 2017-10-04 NOTE — ANESTHESIA POSTPROCEDURE EVALUATION
"Anesthesia Post Evaluation    Patient: Percy Ramirez    Procedure(s) Performed: Procedure(s) (LRB):  COLONOSCOPY (N/A)    Final Anesthesia Type: general  Patient location during evaluation: GI PACU  Patient participation: Yes- Able to Participate  Level of consciousness: awake and alert, awake and oriented  Post-procedure vital signs: reviewed and stable  Pain management: adequate  Airway patency: patent  PONV status at discharge: No PONV  Anesthetic complications: no      Cardiovascular status: blood pressure returned to baseline and hemodynamically stable  Respiratory status: unassisted, spontaneous ventilation and room air  Hydration status: euvolemic  Follow-up not needed.        Visit Vitals  /66   Pulse 61   Temp 36 °C (96.8 °F) (Skin)   Resp 16   Ht 5' 8" (1.727 m)   Wt 77.1 kg (170 lb)   SpO2 97%   BMI 25.85 kg/m²       Pain/Tha Score: Pain Assessment Performed: Yes (10/4/2017 10:19 AM)  Presence of Pain: denies (10/4/2017 10:19 AM)  Tha Score: 9 (10/4/2017 10:19 AM)      "

## 2017-10-18 NOTE — H&P
Pre-Procedure H&P:  Reason for Procedure: screen    HPI:  Pt is a 76 y.o. male screen    Past Medical History:   Diagnosis Date    Allergy     Arthritis     CAD, multiple vessel     DR Melissa    Cataract     Depression     Hyperlipidemia     Hypertension     Macular degeneration     Dr Razo for injection, Hejulia for eye    S/P CABG x 2     Type 2 diabetes mellitus with circulatory disorder     Dr. Aquino       Past Surgical History:   Procedure Laterality Date    broken elbow      left    COLONOSCOPY N/A 10/4/2017    Procedure: COLONOSCOPY;  Surgeon: Gabriel Leung MD;  Location: [facility];  Service: Endoscopy;  Laterality: N/A;    EYE SURGERY      cataract    heart bypass      2004    HERNIA REPAIR      right    ROTATOR CUFF REPAIR      right  2004       Family History   Problem Relation Age of Onset    Arthritis Mother     Diabetes Mother     Stroke Mother     Heart attack Father     Cancer Brother     Throat cancer Brother     Diabetes Maternal Aunt     Diabetes Maternal Uncle     Heart disease Maternal Uncle     Diabetes Paternal Aunt     Heart disease Paternal Aunt     Heart disease Paternal Uncle     Heart disease Maternal Grandmother     Cancer Maternal Grandfather        Social History     Social History    Marital status:      Spouse name: N/A    Number of children: 4    Years of education: GED     Occupational History    retired tug       Social History Main Topics    Smoking status: Former Smoker     Packs/day: 3.00     Years: 45.00     Types: Cigarettes     Quit date: 4/20/2004    Smokeless tobacco: Never Used    Alcohol use Yes      Comment: was heavy drinker 35 years ago, rare use now    Drug use: No    Sexual activity: Yes     Partners: Female     Other Topics Concern    Not on file     Social History Narrative    No narrative on file       Endoscopic History:      Review of patient's allergies indicates:   Allergen Reactions     "Codeine Nausea Only     weak       No current facility-administered medications on file prior to encounter.      Current Outpatient Prescriptions on File Prior to Encounter   Medication Sig Dispense Refill    albuterol 90 mcg/actuation inhaler Inhale 2 puffs into the lungs every 6 (six) hours as needed for Wheezing or Shortness of Breath. 18 g 0    aspirin (ECOTRIN) 81 MG EC tablet Take 81 mg by mouth. 1 Tablet, Delayed Release (E.C.) Oral Every day      carvedilol (COREG) 25 MG tablet Take 25 mg by mouth. 1 Tablet Oral Twice a day       cinnamon bark 500 mg capsule Take 1,000 mg by mouth once daily.        CIPRODEX 0.3-0.1 % DrpS PLACE 3 DROP(S) IN AFFECTED EAR(S) 3 TIMES A DAY  0    clopidogrel (PLAVIX) 75 mg tablet Take 75 mg by mouth once daily.      clotrimazole-betamethasone 1-0.05% (LOTRISONE) cream       diazepam (VALIUM) 2 MG tablet Take 2 mg by mouth continuous prn.      duloxetine (CYMBALTA) 30 MG capsule Take 1 capsule (30 mg total) by mouth once daily. 30 capsule 10    furosemide (LASIX) 40 MG tablet       gabapentin (NEURONTIN) 300 MG capsule TAKE 2 CAPSULES BY MOUTH 3 TIMES DAILY 540 capsule 1    glimepiride (AMARYL) 4 MG tablet Take 8 mg by mouth daily with breakfast.       insulin NPH-insulin regular, 70/30, (NOVOLIN 70/30) 100 unit/mL (70-30) injection Inject into the skin. Inject 5 units at breakfast and inject 10 units at night      insulin syringe-needle U-100 1/2 mL 31 x 5/16" Syrg       ketoconazole (NIZORAL) 2 % cream APPLY TO AFFECTED AREAS ON TOENAILS TWICE DAILY  2    losartan (COZAAR) 100 MG tablet Take 1 tablet (100 mg total) by mouth once daily. 90 tablet 3    metformin (GLUCOPHAGE) 500 MG tablet Take 1,000 mg by mouth 2 (two) times daily with meals. 2 Tablets in the morning, 2 Tablets in the evening      OM-3/DHA/EPA/FISH OIL/VIT D3 (FISH OIL-VIT D3 ORAL) Take 2,000 Units by mouth once daily.       pravastatin (PRAVACHOL) 20 MG tablet Take 20 mg by mouth once daily.  " "    spironolactone (ALDACTONE) 25 MG tablet 25 mg once daily.       VIT C/VIT E AC/LUT/COPPER/ZINC (PRESERVISION LUTEIN ORAL) Take by mouth.      warfarin (COUMADIN) 5 MG tablet Take 2 tabs by mouth on Tuesday and on all other days .5 tabs by mouth      BD INSULIN PEN NEEDLE UF SHORT 31 gauge x 5/16" Ndle       BD INSULIN SYRINGE ULTRA-FINE 1/2 mL 31 gauge x 15/64" Syrg       hydrocodone-acetaminophen 5-325mg (NORCO) 5-325 mg per tablet Take 1 tablet by mouth every 6 (six) hours as needed for Pain. 30 tablet 0    nitroGLYCERIN (NITROSTAT) 0.3 MG SL tablet PLACE 1 TABLET UNDER TONGUE AS NEEDED FOR CHEST PAIN EVERY 5 MINUTES, UP TO 3 DOSES IN 15MINUTES  2     No current facility-administered medications for this encounter.     Current Outpatient Prescriptions:     albuterol 90 mcg/actuation inhaler, Inhale 2 puffs into the lungs every 6 (six) hours as needed for Wheezing or Shortness of Breath., Disp: 18 g, Rfl: 0    aspirin (ECOTRIN) 81 MG EC tablet, Take 81 mg by mouth. 1 Tablet, Delayed Release (E.C.) Oral Every day, Disp: , Rfl:     carvedilol (COREG) 25 MG tablet, Take 25 mg by mouth. 1 Tablet Oral Twice a day , Disp: , Rfl:     cinnamon bark 500 mg capsule, Take 1,000 mg by mouth once daily.  , Disp: , Rfl:     CIPRODEX 0.3-0.1 % DrpS, PLACE 3 DROP(S) IN AFFECTED EAR(S) 3 TIMES A DAY, Disp: , Rfl: 0    clopidogrel (PLAVIX) 75 mg tablet, Take 75 mg by mouth once daily., Disp: , Rfl:     clotrimazole-betamethasone 1-0.05% (LOTRISONE) cream, , Disp: , Rfl:     diazepam (VALIUM) 2 MG tablet, Take 2 mg by mouth continuous prn., Disp: , Rfl:     duloxetine (CYMBALTA) 30 MG capsule, Take 1 capsule (30 mg total) by mouth once daily., Disp: 30 capsule, Rfl: 10    furosemide (LASIX) 40 MG tablet, , Disp: , Rfl:     gabapentin (NEURONTIN) 300 MG capsule, TAKE 2 CAPSULES BY MOUTH 3 TIMES DAILY, Disp: 540 capsule, Rfl: 1    glimepiride (AMARYL) 4 MG tablet, Take 8 mg by mouth daily with breakfast. , Disp: " ", Rfl:     insulin NPH-insulin regular, 70/30, (NOVOLIN 70/30) 100 unit/mL (70-30) injection, Inject into the skin. Inject 5 units at breakfast and inject 10 units at night, Disp: , Rfl:     insulin syringe-needle U-100 1/2 mL 31 x 5/16" Syrg, , Disp: , Rfl:     ketoconazole (NIZORAL) 2 % cream, APPLY TO AFFECTED AREAS ON TOENAILS TWICE DAILY, Disp: , Rfl: 2    losartan (COZAAR) 100 MG tablet, Take 1 tablet (100 mg total) by mouth once daily., Disp: 90 tablet, Rfl: 3    metformin (GLUCOPHAGE) 500 MG tablet, Take 1,000 mg by mouth 2 (two) times daily with meals. 2 Tablets in the morning, 2 Tablets in the evening, Disp: , Rfl:     OM-3/DHA/EPA/FISH OIL/VIT D3 (FISH OIL-VIT D3 ORAL), Take 2,000 Units by mouth once daily. , Disp: , Rfl:     pravastatin (PRAVACHOL) 20 MG tablet, Take 20 mg by mouth once daily., Disp: , Rfl:     spironolactone (ALDACTONE) 25 MG tablet, 25 mg once daily. , Disp: , Rfl:     VIT C/VIT E AC/LUT/COPPER/ZINC (PRESERVISION LUTEIN ORAL), Take by mouth., Disp: , Rfl:     warfarin (COUMADIN) 5 MG tablet, Take 2 tabs by mouth on Tuesday and on all other days .5 tabs by mouth, Disp: , Rfl:     BD INSULIN PEN NEEDLE UF SHORT 31 gauge x 5/16" Ndle, , Disp: , Rfl:     BD INSULIN SYRINGE ULTRA-FINE 1/2 mL 31 gauge x 15/64" Syrg, , Disp: , Rfl:     hydrocodone-acetaminophen 5-325mg (NORCO) 5-325 mg per tablet, Take 1 tablet by mouth every 6 (six) hours as needed for Pain., Disp: 30 tablet, Rfl: 0    nitroGLYCERIN (NITROSTAT) 0.3 MG SL tablet, PLACE 1 TABLET UNDER TONGUE AS NEEDED FOR CHEST PAIN EVERY 5 MINUTES, UP TO 3 DOSES IN 15MINUTES, Disp: , Rfl: 2    ROS: Negative x 10    No data found.      Gen: Well developed, well nourished, no apparent distress  HEENT: Anicteric, PERRLA  CV: S1, S2, no murmers/rubs, non-displaced PMI  Lungs: CTA-B, normal excursion  Abd: Soft, NT, ND, normal BS's, no HSM  Ext: No c/c/e, 1+ DP pulses to BLE's  Neuro: CN II-XII grossly intact, no asterixis.  Skin: No " rashes/lesions.  Psych: AA&O x 4    Assessment:  Pt. Is a 76 y.o. male with:  1. screen    Recommendations:  1. Colonoscopy  2. F/U with PCP      I would like to take this opportunity to thank you for this consult.  If you have any questions or concerns, please do not hesitate to contact me.

## 2017-10-19 ENCOUNTER — TELEPHONE (OUTPATIENT)
Dept: FAMILY MEDICINE | Facility: CLINIC | Age: 77
End: 2017-10-19

## 2017-10-19 NOTE — TELEPHONE ENCOUNTER
----- Message from Julia Clancy sent at 10/18/2017 10:41 AM CDT -----  Contact: self  Patient called stating that he has a transportation form that needs to be filled out. Please contact him at [phone].    Thanks!

## 2017-10-30 ENCOUNTER — TELEPHONE (OUTPATIENT)
Dept: FAMILY MEDICINE | Facility: CLINIC | Age: 77
End: 2017-10-30

## 2017-10-30 DIAGNOSIS — R53.1 WEAKNESS: Primary | ICD-10-CM

## 2017-10-30 NOTE — TELEPHONE ENCOUNTER
----- Message from Allyssa Groves sent at 10/27/2017  9:30 AM CDT -----  Contact: Radha haley/  JORDAN [phone]  Radha haley/ JORDAN need medical Necessity , clinic notes and orders for a can for the pt. Pls fax [phone]. Thanks.........Silvia

## 2017-11-06 ENCOUNTER — TELEPHONE (OUTPATIENT)
Dept: FAMILY MEDICINE | Facility: CLINIC | Age: 77
End: 2017-11-06

## 2017-11-06 NOTE — TELEPHONE ENCOUNTER
----- Message from Julia Clancy sent at 11/6/2017  8:13 AM CST -----  Contact: self  Patient called requesting a walking cane. Please send order to [facility] and contact him at [phone].    Thanks!

## 2017-11-15 ENCOUNTER — HOSPITAL ENCOUNTER (OUTPATIENT)
Dept: PREADMISSION TESTING | Facility: HOSPITAL | Age: 77
Discharge: HOME | End: 2017-11-15
Attending: ORTHOPAEDIC SURGERY
Payer: MEDICARE

## 2017-11-15 VITALS
SYSTOLIC BLOOD PRESSURE: 147 MMHG | HEIGHT: 68 IN | RESPIRATION RATE: 18 BRPM | BODY MASS INDEX: 26.67 KG/M2 | DIASTOLIC BLOOD PRESSURE: 77 MMHG | WEIGHT: 176 LBS | TEMPERATURE: 98 F | HEART RATE: 55 BPM | OXYGEN SATURATION: 96 %

## 2017-11-15 DIAGNOSIS — Z01.818 PRE-OP TESTING: Primary | ICD-10-CM

## 2017-11-15 LAB
ANION GAP SERPL CALC-SCNC: 8 MMOL/L
BASOPHILS # BLD AUTO: 0.03 K/UL
BASOPHILS NFR BLD: 0.4 %
BUN SERPL-MCNC: 14 MG/DL
CALCIUM SERPL-MCNC: 9.2 MG/DL
CHLORIDE SERPL-SCNC: 98 MMOL/L
CO2 SERPL-SCNC: 29 MMOL/L
CREAT SERPL-MCNC: 1.3 MG/DL
DIFFERENTIAL METHOD: ABNORMAL
EOSINOPHIL # BLD AUTO: 0.2 K/UL
EOSINOPHIL NFR BLD: 2.3 %
ERYTHROCYTE [DISTWIDTH] IN BLOOD BY AUTOMATED COUNT: 12.6 %
EST. GFR  (AFRICAN AMERICAN): >60 ML/MIN/1.73 M^2
EST. GFR  (NON AFRICAN AMERICAN): 53 ML/MIN/1.73 M^2
GLUCOSE SERPL-MCNC: 124 MG/DL
HCT VFR BLD AUTO: 36.1 %
HGB BLD-MCNC: 12.5 G/DL
LYMPHOCYTES # BLD AUTO: 1.7 K/UL
LYMPHOCYTES NFR BLD: 20.3 %
MCH RBC QN AUTO: 29.8 PG
MCHC RBC AUTO-ENTMCNC: 34.6 G/DL
MCV RBC AUTO: 86 FL
MONOCYTES # BLD AUTO: 1 K/UL
MONOCYTES NFR BLD: 12.5 %
NEUTROPHILS # BLD AUTO: 5.2 K/UL
NEUTROPHILS NFR BLD: 64.3 %
PLATELET # BLD AUTO: 174 K/UL
PMV BLD AUTO: 9.6 FL
POTASSIUM SERPL-SCNC: 4.9 MMOL/L
RBC # BLD AUTO: 4.19 M/UL
SODIUM SERPL-SCNC: 135 MMOL/L
WBC # BLD AUTO: 8.14 K/UL

## 2017-11-15 PROCEDURE — 36415 COLL VENOUS BLD VENIPUNCTURE: CPT

## 2017-11-15 PROCEDURE — 80048 BASIC METABOLIC PNL TOTAL CA: CPT

## 2017-11-15 PROCEDURE — 93005 ELECTROCARDIOGRAM TRACING: CPT

## 2017-11-15 PROCEDURE — 85025 COMPLETE CBC W/AUTO DIFF WBC: CPT

## 2017-11-15 NOTE — DISCHARGE INSTRUCTIONS
Your surgery is scheduled for____11/22/2017_____________.    Call [phone] between 2 pm and 5 pm ___11/21/2017_________ to find out your arrival time for the day of surgery.    Report to SAME DAY SURGERY UNIT at _______am on the 2nd floor of the hospital.  Use the front entrance of the hospital before 6 am.  If you need wheelchair assistance, call [phone] from your cell phone,  or call 0 from the courtesy phone in the hospital lobby.    Important instructions:   Do not eat or drink after 12 midnight, including water.  It is okay to brush your teeth.  Do not have gum, candy or mints.     Take only these medications with a small swallow of water on the morning of your surgery.___carvedilol, duloxetine, gabapentin__________         Please shower the night before and the morning of your surgery.       Use Hibiclens soap to your surgery site if instructed by your pre op nurse.    Be sure to rinse off Hibiclens after it is on your skin for several minutes.  Do not use Hibiclens on your face or genitals.      You may wear deodorant only.      Do not wear powder, body lotion or cologne.     Do not wear any jewelry or have any metal on your body.     Please bring any documents given to you by your doctor.     If you are going home on the same day of surgery, you must have arrangements for a ride home.  You will not be able to drive home if you were given anesthesia or sedation.     Do not take any diabetic medication on the morning of surgery unless instructed to do so by your doctor or pre op nurse.     Stop taking Aspirin, Ibuprofen, Motrin and Aleve at least 7 days before your surgery. You may use Tylenol.     Stop taking fish oil and vitamin E for least 7 days before surgery.     Wear loose fitting clothes allowing for bandages.     Please leave money and valuables home.       You may bring your cell phone.     Call the doctor if fever or illness should occur before your surgery.    Call [phone] to  contact us here at [facility] if needed.

## 2017-11-22 ENCOUNTER — ANESTHESIA EVENT (OUTPATIENT)
Dept: SURGERY | Facility: HOSPITAL | Age: 77
End: 2017-11-22
Payer: MEDICARE

## 2017-11-22 ENCOUNTER — HOSPITAL ENCOUNTER (OUTPATIENT)
Facility: HOSPITAL | Age: 77
Discharge: HOME | End: 2017-11-22
Attending: ORTHOPAEDIC SURGERY | Admitting: ORTHOPAEDIC SURGERY
Payer: MEDICARE

## 2017-11-22 ENCOUNTER — ANESTHESIA (OUTPATIENT)
Dept: SURGERY | Facility: HOSPITAL | Age: 77
End: 2017-11-22
Payer: MEDICARE

## 2017-11-22 VITALS
OXYGEN SATURATION: 95 % | WEIGHT: 176 LBS | BODY MASS INDEX: 26.67 KG/M2 | RESPIRATION RATE: 18 BRPM | TEMPERATURE: 98 F | SYSTOLIC BLOOD PRESSURE: 152 MMHG | DIASTOLIC BLOOD PRESSURE: 92 MMHG | HEART RATE: 61 BPM | HEIGHT: 68 IN

## 2017-11-22 DIAGNOSIS — I70.0 ATHEROSCLEROSIS OF ABDOMINAL AORTA: ICD-10-CM

## 2017-11-22 DIAGNOSIS — M18.12 OSTEOARTHRITIS OF CARPOMETACARPAL JOINT OF LEFT THUMB, UNSPECIFIED OSTEOARTHRITIS TYPE: Primary | ICD-10-CM

## 2017-11-22 DIAGNOSIS — M54.50 CHRONIC MIDLINE LOW BACK PAIN WITHOUT SCIATICA: ICD-10-CM

## 2017-11-22 DIAGNOSIS — E66.3 OVERWEIGHT (BMI 25.0-29.9): ICD-10-CM

## 2017-11-22 DIAGNOSIS — I20.89 CHRONIC STABLE ANGINA: ICD-10-CM

## 2017-11-22 DIAGNOSIS — F33.0 MAJOR DEPRESSIVE DISORDER, RECURRENT EPISODE, MILD: ICD-10-CM

## 2017-11-22 DIAGNOSIS — Z95.1 S/P CABG X 2: ICD-10-CM

## 2017-11-22 DIAGNOSIS — Z12.11 COLON CANCER SCREENING: ICD-10-CM

## 2017-11-22 DIAGNOSIS — D69.2 SENILE PURPURA: ICD-10-CM

## 2017-11-22 DIAGNOSIS — E11.319 UNCONTROLLED TYPE 2 DIABETES MELLITUS WITH RETINOPATHY, MACULAR EDEMA PRESENCE UNSPECIFIED, UNSPECIFIED LATERALITY, UNSPECIFIED LONG TERM INSULIN USE STATUS, UNSPECIFIED RETINOPATHY SEVERITY: ICD-10-CM

## 2017-11-22 DIAGNOSIS — E11.65 UNCONTROLLED TYPE 2 DIABETES MELLITUS WITH RETINOPATHY, MACULAR EDEMA PRESENCE UNSPECIFIED, UNSPECIFIED LATERALITY, UNSPECIFIED LONG TERM INSULIN USE STATUS, UNSPECIFIED RETINOPATHY SEVERITY: ICD-10-CM

## 2017-11-22 DIAGNOSIS — H35.30 MACULAR DEGENERATION: ICD-10-CM

## 2017-11-22 DIAGNOSIS — G89.29 CHRONIC MIDLINE LOW BACK PAIN WITHOUT SCIATICA: ICD-10-CM

## 2017-11-22 DIAGNOSIS — M18.12 PRIMARY OSTEOARTHRITIS OF FIRST CARPOMETACARPAL JOINT OF LEFT HAND: ICD-10-CM

## 2017-11-22 DIAGNOSIS — E78.5 HYPERLIPIDEMIA LDL GOAL <100: ICD-10-CM

## 2017-11-22 DIAGNOSIS — Z79.4 INSULIN LONG-TERM USE: ICD-10-CM

## 2017-11-22 DIAGNOSIS — I77.9 BILATERAL CAROTID ARTERY DISEASE: ICD-10-CM

## 2017-11-22 DIAGNOSIS — I48.0 PAROXYSMAL ATRIAL FIBRILLATION: ICD-10-CM

## 2017-11-22 DIAGNOSIS — J44.9 CHRONIC OBSTRUCTIVE PULMONARY DISEASE, UNSPECIFIED COPD TYPE: ICD-10-CM

## 2017-11-22 DIAGNOSIS — M17.0 PRIMARY OSTEOARTHRITIS OF BOTH KNEES: ICD-10-CM

## 2017-11-22 DIAGNOSIS — M47.816 SPONDYLOSIS OF LUMBAR REGION WITHOUT MYELOPATHY OR RADICULOPATHY: ICD-10-CM

## 2017-11-22 DIAGNOSIS — G47.33 OBSTRUCTIVE SLEEP APNEA ON CPAP: ICD-10-CM

## 2017-11-22 DIAGNOSIS — I25.10 CAD, MULTIPLE VESSEL: ICD-10-CM

## 2017-11-22 DIAGNOSIS — I10 ESSENTIAL HYPERTENSION: Chronic | ICD-10-CM

## 2017-11-22 LAB
INR PPP: 1
POCT GLUCOSE: 148 MG/DL (ref 70–110)
PROTHROMBIN TIME: 10.6 SEC

## 2017-11-22 PROCEDURE — 36000709 HC OR TIME LEV III EA ADD 15 MIN: Performed by: ORTHOPAEDIC SURGERY

## 2017-11-22 PROCEDURE — 71000016 HC POSTOP RECOV ADDL HR: Performed by: ORTHOPAEDIC SURGERY

## 2017-11-22 PROCEDURE — C1713 ANCHOR/SCREW BN/BN,TIS/BN: HCPCS | Performed by: ORTHOPAEDIC SURGERY

## 2017-11-22 PROCEDURE — 25000003 PHARM REV CODE 250: Performed by: ORTHOPAEDIC SURGERY

## 2017-11-22 PROCEDURE — 64415 NJX AA&/STRD BRCH PLXS IMG: CPT | Mod: 59,LT,, | Performed by: ANESTHESIOLOGY

## 2017-11-22 PROCEDURE — 63600175 PHARM REV CODE 636 W HCPCS: Performed by: NURSE ANESTHETIST, CERTIFIED REGISTERED

## 2017-11-22 PROCEDURE — 71000015 HC POSTOP RECOV 1ST HR: Performed by: ORTHOPAEDIC SURGERY

## 2017-11-22 PROCEDURE — D9220A PRA ANESTHESIA: Mod: CRNA,,, | Performed by: NURSE ANESTHETIST, CERTIFIED REGISTERED

## 2017-11-22 PROCEDURE — 25000003 PHARM REV CODE 250: Performed by: ANESTHESIOLOGY

## 2017-11-22 PROCEDURE — 63600175 PHARM REV CODE 636 W HCPCS: Performed by: ANESTHESIOLOGY

## 2017-11-22 PROCEDURE — 76942 ECHO GUIDE FOR BIOPSY: CPT | Performed by: ANESTHESIOLOGY

## 2017-11-22 PROCEDURE — D9220A PRA ANESTHESIA: Mod: ANES,,, | Performed by: ANESTHESIOLOGY

## 2017-11-22 PROCEDURE — 37000009 HC ANESTHESIA EA ADD 15 MINS: Performed by: ORTHOPAEDIC SURGERY

## 2017-11-22 PROCEDURE — 82962 GLUCOSE BLOOD TEST: CPT | Performed by: ORTHOPAEDIC SURGERY

## 2017-11-22 PROCEDURE — 71000033 HC RECOVERY, INTIAL HOUR: Performed by: ORTHOPAEDIC SURGERY

## 2017-11-22 PROCEDURE — 63600175 PHARM REV CODE 636 W HCPCS: Performed by: ORTHOPAEDIC SURGERY

## 2017-11-22 PROCEDURE — 36415 COLL VENOUS BLD VENIPUNCTURE: CPT

## 2017-11-22 PROCEDURE — 85610 PROTHROMBIN TIME: CPT

## 2017-11-22 PROCEDURE — 76942 ECHO GUIDE FOR BIOPSY: CPT | Mod: 26,,, | Performed by: ANESTHESIOLOGY

## 2017-11-22 PROCEDURE — 37000008 HC ANESTHESIA 1ST 15 MINUTES: Performed by: ORTHOPAEDIC SURGERY

## 2017-11-22 PROCEDURE — 27201423 OPTIME MED/SURG SUP & DEVICES STERILE SUPPLY: Performed by: ORTHOPAEDIC SURGERY

## 2017-11-22 PROCEDURE — 36000708 HC OR TIME LEV III 1ST 15 MIN: Performed by: ORTHOPAEDIC SURGERY

## 2017-11-22 DEVICE — SYS CMC LIG RECON IMPLANT: Type: IMPLANTABLE DEVICE | Site: HAND | Status: FUNCTIONAL

## 2017-11-22 RX ORDER — SODIUM CHLORIDE 0.9 G/100ML
IRRIGANT IRRIGATION
Status: DISCONTINUED | OUTPATIENT
Start: 2017-11-22 | End: 2017-11-22 | Stop reason: HOSPADM

## 2017-11-22 RX ORDER — LIDOCAINE HCL/PF 100 MG/5ML
SYRINGE (ML) INTRAVENOUS
Status: DISCONTINUED | OUTPATIENT
Start: 2017-11-22 | End: 2017-11-22

## 2017-11-22 RX ORDER — PROPOFOL 10 MG/ML
VIAL (ML) INTRAVENOUS
Status: DISCONTINUED | OUTPATIENT
Start: 2017-11-22 | End: 2017-11-22

## 2017-11-22 RX ORDER — LORAZEPAM 2 MG/ML
0.25 INJECTION INTRAMUSCULAR ONCE AS NEEDED
Status: DISCONTINUED | OUTPATIENT
Start: 2017-11-22 | End: 2017-11-22 | Stop reason: HOSPADM

## 2017-11-22 RX ORDER — CEFAZOLIN SODIUM 2 G/50ML
2 SOLUTION INTRAVENOUS
Status: COMPLETED | OUTPATIENT
Start: 2017-11-22 | End: 2017-11-22

## 2017-11-22 RX ORDER — LIDOCAINE HYDROCHLORIDE 10 MG/ML
1 INJECTION, SOLUTION EPIDURAL; INFILTRATION; INTRACAUDAL; PERINEURAL ONCE
Status: DISCONTINUED | OUTPATIENT
Start: 2017-11-22 | End: 2017-11-22 | Stop reason: HOSPADM

## 2017-11-22 RX ORDER — SODIUM CHLORIDE, SODIUM LACTATE, POTASSIUM CHLORIDE, CALCIUM CHLORIDE 600; 310; 30; 20 MG/100ML; MG/100ML; MG/100ML; MG/100ML
INJECTION, SOLUTION INTRAVENOUS CONTINUOUS
Status: DISCONTINUED | OUTPATIENT
Start: 2017-11-22 | End: 2017-11-22 | Stop reason: HOSPADM

## 2017-11-22 RX ORDER — OXYCODONE AND ACETAMINOPHEN 7.5; 325 MG/1; MG/1
1 TABLET ORAL EVERY 4 HOURS PRN
Qty: 40 TABLET | Refills: 0 | Status: SHIPPED | OUTPATIENT
Start: 2017-11-22 | End: 2017-12-02

## 2017-11-22 RX ORDER — SODIUM CHLORIDE 0.9 % (FLUSH) 0.9 %
3 SYRINGE (ML) INJECTION
Status: DISCONTINUED | OUTPATIENT
Start: 2017-11-22 | End: 2017-11-22 | Stop reason: HOSPADM

## 2017-11-22 RX ORDER — FENTANYL CITRATE 50 UG/ML
25 INJECTION, SOLUTION INTRAMUSCULAR; INTRAVENOUS EVERY 5 MIN PRN
Status: DISCONTINUED | OUTPATIENT
Start: 2017-11-22 | End: 2017-11-22 | Stop reason: HOSPADM

## 2017-11-22 RX ORDER — MIDAZOLAM HYDROCHLORIDE 1 MG/ML
INJECTION, SOLUTION INTRAMUSCULAR; INTRAVENOUS
Status: DISCONTINUED | OUTPATIENT
Start: 2017-11-22 | End: 2017-11-22

## 2017-11-22 RX ORDER — FENTANYL CITRATE 50 UG/ML
INJECTION, SOLUTION INTRAMUSCULAR; INTRAVENOUS
Status: DISCONTINUED | OUTPATIENT
Start: 2017-11-22 | End: 2017-11-22

## 2017-11-22 RX ORDER — OXYCODONE AND ACETAMINOPHEN 7.5; 325 MG/1; MG/1
1 TABLET ORAL ONCE
Status: COMPLETED | OUTPATIENT
Start: 2017-11-22 | End: 2017-11-22

## 2017-11-22 RX ORDER — HYDROMORPHONE HYDROCHLORIDE 2 MG/ML
0.2 INJECTION, SOLUTION INTRAMUSCULAR; INTRAVENOUS; SUBCUTANEOUS EVERY 5 MIN PRN
Status: DISCONTINUED | OUTPATIENT
Start: 2017-11-22 | End: 2017-11-22 | Stop reason: HOSPADM

## 2017-11-22 RX ADMIN — MIDAZOLAM HYDROCHLORIDE 2 MG: 1 INJECTION, SOLUTION INTRAMUSCULAR; INTRAVENOUS at 07:11

## 2017-11-22 RX ADMIN — SODIUM CHLORIDE, SODIUM LACTATE, POTASSIUM CHLORIDE, AND CALCIUM CHLORIDE: .6; .31; .03; .02 INJECTION, SOLUTION INTRAVENOUS at 06:11

## 2017-11-22 RX ADMIN — CEFAZOLIN SODIUM 2 G: 2 SOLUTION INTRAVENOUS at 08:11

## 2017-11-22 RX ADMIN — PROPOFOL 100 MG: 10 INJECTION, EMULSION INTRAVENOUS at 08:11

## 2017-11-22 RX ADMIN — FENTANYL CITRATE 50 MCG: 50 INJECTION INTRAMUSCULAR; INTRAVENOUS at 07:11

## 2017-11-22 RX ADMIN — FENTANYL CITRATE 25 MCG: 50 INJECTION INTRAMUSCULAR; INTRAVENOUS at 10:11

## 2017-11-22 RX ADMIN — LIDOCAINE HYDROCHLORIDE 100 MG: 20 INJECTION, SOLUTION INTRAVENOUS at 08:11

## 2017-11-22 RX ADMIN — OXYCODONE HYDROCHLORIDE AND ACETAMINOPHEN 1 TABLET: 7.5; 325 TABLET ORAL at 11:11

## 2017-11-22 NOTE — DISCHARGE INSTRUCTIONS
***  ACTIVITY LEVEL: If you have received sedation or an anesthetic, you may feel sleepy for several hours. Rest until you are more awake. Gradually resume your normal activities.              DIET: You may resume your home diet. If nausea is present, increase your diet gradually with fluids and bland foods.    Medications: Pain medication should be taken only if needed and as directed. If antibiotics are prescribed, the medication should be taken until completed. You will be given an updated list of you medications.    ? No driving, alcoholic beverages or signing legal documents for next 24 hours or while taking pain medication.  Last pain medication was taken at 11:15AM    CALL THE DOCTOR:    For any obvious bleeding (some dried blood over the incision is normal).      Redness, swelling, foul smell around incision or fever over 101.   Shortness of breath, Coughing up Bloody Sputum or Pains or Swelling in your Calves.   Persistent pain or nausea not relieved by medication.   Impaired circulation such as tingling, change in color, numbness or tingling, coldness.    If any unusual problems or difficulties occur contact your doctor. If you cannot contact your doctor but feel your signs and symptoms warrant a physicians attention return to the emergency room.  Fall Prevention  Millions of people fall every year and injure themselves. You may have had anesthesia or sedation which may increase your risk of falling. You may have health issues that put you at an increased risk of falling.     Here are ways to reduce your risk of falling.  ·   · Make your home safe by keeping walkways clear of objects you may trip over.  · Use non-slip pads under rugs. Do not use area rugs or small throw rugs.  · Use non-slip mats in bathtubs and showers.  · Install handrails and lights on staircases.  · Do not walk in poorly lit areas.  · Do not stand on chairs or wobbly ladders.  · Use caution when reaching overhead or looking  upward. This position can cause a loss of balance.  · Be sure your shoes fit properly, have non-slip bottoms and are in good condition.   · Wear shoes both inside and out. Avoid going barefoot or wearing slippers.  · Be cautious when going up and down stairs, curbs, and when walking on uneven sidewalks.  · If your balance is poor, consider using a cane or walker.  · If your fall was related to alcohol use, stop or limit alcohol intake.   · If your fall was related to use of sleeping medicines, talk to your doctor about this. You may need to reduce your dosage at bedtime if you awaken during the night to go to the bathroom.    · To reduce the need for nighttime bathroom trips:  ¨ Avoid drinking fluids for several hours before going to bed  ¨ Empty your bladder before going to bed  ¨ Men can keep a urinal at the bedside  · Stay as active as you can. Balance, flexibility, strength, and endurance all come from exercise. They all play a role in preventing falls. Ask your healthcare provider which types of activity are right for you.  · Get your vision checked on a regular basis.  · If you have pets, know where they are before you stand up or walk so you don't trip over them.  · Use night lights.

## 2017-11-22 NOTE — ADDENDUM NOTE
Addendum  created 11/22/17 1149 by Jean-Pierre Khan MD    Anesthesia Intra Blocks edited, LDA updated via procedure documentation, Sign clinical note

## 2017-11-22 NOTE — ANESTHESIA POSTPROCEDURE EVALUATION
"Anesthesia Post Evaluation    Patient: Percy Ramirez    Procedure(s) Performed: Procedure(s) (LRB):  CARPAL METACARPAL INTERPOSITIONAL ARTHROPLASTY-THUMB (Right)  RECONSTRUCTION-TENDON-FLEXOR CARPI RADIAS (Right)    Final Anesthesia Type: general  Patient location during evaluation: PACU  Patient participation: Yes- Able to Participate  Level of consciousness: awake and alert  Post-procedure vital signs: reviewed and stable  Pain management: adequate  Airway patency: patent  PONV status at discharge: No PONV  Anesthetic complications: no      Cardiovascular status: blood pressure returned to baseline and hemodynamically stable  Respiratory status: unassisted and spontaneous ventilation  Hydration status: euvolemic  Follow-up not needed.        Visit Vitals  BP (!) 147/84 (BP Location: Left arm, Patient Position: Lying)   Pulse 61   Temp 36.4 °C (97.5 °F) (Oral)   Resp 18   Ht 5' 8" (1.727 m)   Wt 79.8 kg (176 lb)   SpO2 (!) 94%   BMI 26.76 kg/m²       Pain/Tha Score: Pain Assessment Performed: Yes (11/22/2017 10:38 AM)  Presence of Pain: complains of pain/discomfort (11/22/2017 10:38 AM)  Pain Rating Prior to Med Admin: 9 (11/22/2017 11:15 AM)  Tha Score: 10 (11/22/2017 10:38 AM)      "

## 2017-11-22 NOTE — BRIEF OP NOTE
Operative Note     SUMMARY     Surgery Date: 11/22/2017     Surgeon(s) and Role:     * Malissa Perez III, MD - Primary    Pre-op Diagnosis:  Osteoarthritis of carpometacarpal joint of left thumb, unspecified osteoarthritis type [M18.12]  Osteoarthritis of carpometacarpal joint of right thumb, unspecified osteoarthritis type [M18.11]    Post-op Diagnosis:  Osteoarthritis of carpometacarpal joint of left thumb, unspecified osteoarthritis type [M18.12]  Osteoarthritis of carpometacarpal joint of right thumb, unspecified osteoarthritis type [M18.11]    Procedure(s) (LRB):  CARPAL METACARPAL INTERPOSITIONAL ARTHROPLASTY-THUMB (Right)  RECONSTRUCTION-TENDON-FLEXOR CARPI RADIAS (Right)    Anesthesia: General    Findings/Key Components:      Estimated Blood Loss: min          Specimens     None            Discharge Note      SUMMARY     Admit Date: 11/22/2017    Attending Physician: Malissa Perez III, MD     Discharge Physician: Malissa Perez III, MD    Discharge Date:  11/22/2017      Final Diagnosis:  Osteoarthritis of carpometacarpal joint of left thumb, unspecified osteoarthritis type [M18.12]  Osteoarthritis of carpometacarpal joint of right thumb, unspecified osteoarthritis type [M18.11]    Outcome of Case and Hospitilization:  Improved    Disposition:  Home / Self Care    Patient Instructions:   Current Discharge Medication List      START taking these medications    Details   oxyCODONE-acetaminophen (PERCOCET) 7.5-325 mg per tablet Take 1 tablet by mouth every 4 (four) hours as needed for Pain.  Qty: 40 tablet, Refills: 0         CONTINUE these medications which have NOT CHANGED    Details   carvedilol (COREG) 25 MG tablet Take 25 mg by mouth. 1 Tablet Oral Twice a day       diazepam (VALIUM) 2 MG tablet Take 2 mg by mouth continuous prn.      duloxetine (CYMBALTA) 30 MG capsule Take 1 capsule (30 mg total) by mouth once daily.  Qty: 30 capsule, Refills: 10    Associated Diagnoses: Major depressive  "disorder, recurrent episode, mild      furosemide (LASIX) 40 MG tablet Take 40 mg by mouth once daily.       gabapentin (NEURONTIN) 300 MG capsule TAKE 2 CAPSULES BY MOUTH 3 TIMES DAILY  Qty: 540 capsule, Refills: 1      glimepiride (AMARYL) 4 MG tablet Take 8 mg by mouth daily with breakfast.       losartan (COZAAR) 100 MG tablet Take 1 tablet (100 mg total) by mouth once daily.  Qty: 90 tablet, Refills: 3      albuterol 90 mcg/actuation inhaler Inhale 2 puffs into the lungs every 6 (six) hours as needed for Wheezing or Shortness of Breath.  Qty: 18 g, Refills: 0    Associated Diagnoses: COPD exacerbation      BD INSULIN PEN NEEDLE UF SHORT 31 gauge x 5/16" Ndle       BD INSULIN SYRINGE ULTRA-FINE 1/2 mL 31 gauge x 15/64" Syrg       cinnamon bark 500 mg capsule Take 1,000 mg by mouth once daily.        clopidogrel (PLAVIX) 75 mg tablet Take 75 mg by mouth once daily.      insulin NPH-insulin regular, 70/30, (NOVOLIN 70/30) 100 unit/mL (70-30) injection Inject into the skin. Inject 5 units at breakfast and inject 10 units at night      insulin syringe-needle U-100 1/2 mL 31 x 5/16" Syrg       metformin (GLUCOPHAGE) 500 MG tablet Take 1,000 mg by mouth 2 (two) times daily with meals. 2 Tablets in the morning, 2 Tablets in the evening      nitroGLYCERIN (NITROSTAT) 0.3 MG SL tablet PLACE 1 TABLET UNDER TONGUE AS NEEDED FOR CHEST PAIN EVERY 5 MINUTES, UP TO 3 DOSES IN 15MINUTES  Refills: 2      OM-3/DHA/EPA/FISH OIL/VIT D3 (FISH OIL-VIT D3 ORAL) Take 2,000 Units by mouth once daily.       pravastatin (PRAVACHOL) 20 MG tablet Take 20 mg by mouth once daily.      spironolactone (ALDACTONE) 25 MG tablet 25 mg once daily.       VIT C/VIT E AC/LUT/COPPER/ZINC (PRESERVISION LUTEIN ORAL) Take by mouth.      warfarin (COUMADIN) 5 MG tablet Take 2 tabs by mouth on Tuesday and on all other days .5 tabs by mouth             Discharge Procedure Orders (must include Diet, Follow-up, Activity)    Discharge Procedure Orders (must " include Diet, Follow-up, Activity)  Diet general     Activity as tolerated     Leave dressing on - Keep it clean, dry, and intact until clinic visit          Follow-up Information     Malissa Perez III, MD In 2 weeks.    Specialty:  Orthopedic Surgery  Contact information:  [address]  [address]  [place] [address]  [phone]

## 2017-11-22 NOTE — ANESTHESIA PROCEDURE NOTES
Peripheral nerve block    Patient location during procedure: holding area   Block not for primary anesthetic.  Reason for block: at surgeon's request and post-op pain management   Post-op Pain Location: right thumb pain  Start time: 11/22/2017 7:00 AM  Timeout: 11/22/2017 7:00 AM   End time: 11/22/2017 7:05 AM  Surgery related to: right thumb pain  Staffing  Anesthesiologist: MACEY MCNAIR  Performed: anesthesiologist   Preanesthetic Checklist  Completed: patient identified, site marked, surgical consent, pre-op evaluation, timeout performed, IV checked, risks and benefits discussed and monitors and equipment checked  Peripheral Block  Patient position: supine  Prep: ChloraPrep  Patient monitoring: heart rate, cardiac monitor, continuous pulse ox and frequent blood pressure checks  Block type: supraclavicular  Laterality: right  Injection technique: single shot  Needle  Needle type: Echogenic   Needle gauge: 22 G  Needle length: 2 in  Needle localization: ultrasound guidance   -ultrasound image captured on disc.  Assessment  Injection assessment: negative aspiration, negative parasthesia and local visualized surrounding nerve  Paresthesia pain: none  Heart rate change: no  Slow fractionated injection: yes  Medications:  Bolus administered: 20 mL of 0.375 bupivacaine  Epinephrine added: none  Additional Notes  Block was augmented with injection of 8 mL mepivacaine 2% just proximal to the radial stylus

## 2017-11-22 NOTE — OR NURSING
Right arm in sling, dressing dry and intact.  Positive movement and sensation to fingers.  Warm to touch, brisk refill.Currently reports pain of a 6 on 0-10 prs

## 2017-11-22 NOTE — OP NOTE
DATE OF PROCEDURE:  11/22/2017    SURGEON:  Malissa Perez III, M.D.    PREOPERATIVE DIAGNOSIS:  Osteoarthritis, right thumb carpometacarpal joint.    POSTOPERATIVE DIAGNOSIS:  Osteoarthritis, right thumb carpometacarpal joint.    OPERATIVE PROCEDURES:  1.  Right CMC arthroplasty with trapeziectomy.  2.  Ligament reconstruction with flexor carpi radialis tendon transfer.    INDICATION:  The patient is a 77-year-old male with a history of right thumb CMC   osteoarthritis, failed conservative treatment, elected to go forward with   surgery.    PROCEDURE IN DETAIL:  After informed consent was obtained, the patient was   brought to the Operating Room, placed in supine position on the operating table.    Supraclavicular anesthesia was also induced.  The right arm was prepped and   draped in sterile fashion.  Arm was exsanguinated and tourniquet inflated to 250   mmHg.  Incision was carried out over the radial side of the CMC joint.    Dissection was taken down between the APL and EPB.  The trapezium was removed   subperiosteally.  Fluoroscopy was used to confirm proper removal of trapezium.    A 4-mm tunnel was drilled with a cannulated drill in the base of the first   metacarpal.  The FCR tendon was harvested through a separate incision at the   musculocutaneous junction and was withdrawn into the distal wound.  A tendon   shuttle was used to pass it back through the hole in the base of the first   metacarpal to complete the tendon transfer of the flexor carpi radialis tendon   for ligament reconstruction.  It was held in place with a 4-mm Bio-Tenodesis   screw from the [employer] set.  The remainder of the tendon was stuffed into wound,   it was sewn on to itself and then the capsule was closed tightly, excellent   stability was obtained.  The wound was then thoroughly irrigated.  The   tourniquet was deflated.  Hemostasis was obtained with electrocautery.  Incision   was closed with interrupted 4-0 nylon.  He was placed  in sterile dressing and a   thumb spica splint and brought to Recovery Room in stable condition.    ESTIMATED BLOOD LOSS:  Minimal.    COMPLICATIONS:  None noted.      AURE  dd: 11/22/2017 09:43:10 (CST)  td: 11/22/2017 11:36:04 (CST)  Doc ID   #[id number]  Job ID #[id number]    CC:

## 2017-11-22 NOTE — TRANSFER OF CARE
"Anesthesia Transfer of Care Note    Patient: Percy Ramirez    Procedure(s) Performed: Procedure(s) (LRB):  CARPAL METACARPAL INTERPOSITIONAL ARTHROPLASTY-THUMB (Right)  RECONSTRUCTION-TENDON-FLEXOR CARPI RADIAS (Right)    Patient location: PACU    Anesthesia Type: general    Transport from OR: Transported from OR on room air with adequate spontaneous ventilation    Post pain: adequate analgesia    Post assessment: no apparent anesthetic complications    Post vital signs: stable    Level of consciousness: awake, alert and oriented    Nausea/Vomiting: no nausea/vomiting    Complications: none    Transfer of care protocol was followed      Last vitals:   Visit Vitals  BP (!) 160/78 (BP Location: Left arm, Patient Position: Lying)   Pulse 68   Temp 36.6 °C (97.9 °F) (Oral)   Resp 12   Ht 5' 8" (1.727 m)   Wt 79.8 kg (176 lb)   SpO2 95%   BMI 26.76 kg/m²     "

## 2017-11-22 NOTE — ANESTHESIA PREPROCEDURE EVALUATION
11/22/2017  Percy Ramirez is a 77 y.o., male.    Anesthesia Evaluation     I have reviewed the Nursing Notes.      Review of Systems  Anesthesia Hx:  No problems with previous Anesthesia   Social:  Former Smoker    Hematology/Oncology:        Hematology Comments: INR=1   Cardiovascular:   Denies Pacemaker. Hypertension  Denies Valvular problems/Murmurs. CAD   CABG/stent (2004 CABG) Dysrhythmias atrial fibrillation  Denies Angina.             hyperlipidemia    Pulmonary:   COPD Sleep Apnea, CPAP    Hepatic/GI:  Hepatic/GI Normal    Musculoskeletal:   Arthritis     Neurological:   Denies CVA. Denies Seizures.    Endocrine:   Diabetes, using insulin Denies Hypothyroidism. Denies Hyperthyroidism.    Psych:   Psychiatric History          Physical Exam  General:  Well nourished    Airway/Jaw/Neck:  AIRWAY FINDINGS: Normal           Mental Status:  Mental Status Findings: Normal        Anesthesia Plan  Type of Anesthesia, risks & benefits discussed:  Anesthesia Type:  regional, general  Patient's Preference:   Intra-op Monitoring Plan: standard ASA monitors  Intra-op Monitoring Plan Comments:   Post Op Pain Control Plan:   Post Op Pain Control Plan Comments:   Induction:   IV  Beta Blocker:  Patient is on a Beta-Blocker and has received one dose within the past 24 hours (No further documentation required).       Informed Consent: Patient understands risks and agrees with Anesthesia plan.  Questions answered. Anesthesia consent signed with patient.  ASA Score: 3     Day of Surgery Review of History & Physical:    H&P update referred to the surgeon.         Ready For Surgery From Anesthesia Perspective.

## 2017-11-27 ENCOUNTER — TELEPHONE (OUTPATIENT)
Dept: FAMILY MEDICINE | Facility: CLINIC | Age: 77
End: 2017-11-27

## 2017-11-27 NOTE — TELEPHONE ENCOUNTER
----- Message from Bhavya Mo sent at 11/27/2017  2:48 PM CST -----  Contact: [phone]  Pt is requesting a prescription for congestion Please call pt at your earliest convenience.  Thanks !

## 2017-11-27 NOTE — TELEPHONE ENCOUNTER
Pt states that he has been using Zyrtec and it has been working. Advised pt that Coricidin was recommended by the doctor and can be taken more than once a day. Pt states that he has not had a fever and once he is out of zyrtec he will try the suggested meds. Informed pt that if symptoms worsened to make appt. Verbal understanding.

## 2017-11-27 NOTE — TELEPHONE ENCOUNTER
Recommend patient increase fluid intake and try otc coricidin HBP for his symptoms. Recommend office visit if he develops fever or symptoms last longer than 10 days.

## 2017-12-04 ENCOUNTER — OFFICE VISIT (OUTPATIENT)
Dept: PODIATRY | Facility: CLINIC | Age: 77
End: 2017-12-04
Payer: MEDICARE

## 2017-12-04 VITALS
WEIGHT: 176 LBS | HEIGHT: 68 IN | DIASTOLIC BLOOD PRESSURE: 84 MMHG | SYSTOLIC BLOOD PRESSURE: 168 MMHG | BODY MASS INDEX: 26.67 KG/M2

## 2017-12-04 DIAGNOSIS — L84 CORN OR CALLUS: ICD-10-CM

## 2017-12-04 DIAGNOSIS — E11.49 TYPE II DIABETES MELLITUS WITH NEUROLOGICAL MANIFESTATIONS: ICD-10-CM

## 2017-12-04 DIAGNOSIS — B35.1 ONYCHOMYCOSIS DUE TO DERMATOPHYTE: ICD-10-CM

## 2017-12-04 DIAGNOSIS — M20.10 HALLUX ABDUCTO VALGUS, UNSPECIFIED LATERALITY: ICD-10-CM

## 2017-12-04 DIAGNOSIS — M20.42 HAMMER TOES OF BOTH FEET: ICD-10-CM

## 2017-12-04 DIAGNOSIS — M20.41 HAMMER TOES OF BOTH FEET: ICD-10-CM

## 2017-12-04 PROCEDURE — 99999 PR PBB SHADOW E&M-EST. PATIENT-LVL IV: CPT | Mod: PBBFAC,,, | Performed by: PODIATRIST

## 2017-12-04 PROCEDURE — 99499 UNLISTED E&M SERVICE: CPT | Mod: S$GLB,,, | Performed by: PODIATRIST

## 2017-12-04 PROCEDURE — 99499 UNLISTED E&M SERVICE: CPT | Mod: S$PBB,,, | Performed by: PODIATRIST

## 2017-12-04 PROCEDURE — 11721 DEBRIDE NAIL 6 OR MORE: CPT | Mod: 59,Q9,S$GLB, | Performed by: PODIATRIST

## 2017-12-04 PROCEDURE — 11056 PARNG/CUTG B9 HYPRKR LES 2-4: CPT | Mod: Q9,S$GLB,, | Performed by: PODIATRIST

## 2017-12-04 NOTE — PROGRESS NOTES
Subjective:      Patient ID: Percy Ramirez is a 77 y.o. male.    Chief Complaint: Diabetes Mellitus (dr. Edmondson 7/12/17); Diabetic Foot Exam; and Nail Care    Percy is a 77 y.o. male who presents to the clinic for evaluation and treatment of high risk feet. Percy has a past medical history of Allergy; Arthritis; CAD, multiple vessel; Cataract; Depression; Hyperlipidemia; Hypertension; Macular degeneration; S/P CABG x 2; and Type 2 diabetes mellitus with circulatory disorder. The patient's chief complaint is elongated, thickened toenails aggravated by increased weight bearing, shoe gear, pressure.    Patient admits to barefoot walking often in and around home    This patient has documented high risk feet requiring routine maintenance secondary to diabetes mellitis and those secondary complications of diabetes, as mentioned..    PCP: Kasie Edmondson MD    Date Last Seen by PCP:   Chief Complaint   Patient presents with    Diabetes Mellitus     dr. Edmondson 7/12/17    Diabetic Foot Exam    Nail Care     Current shoe gear:  slippers    Hemoglobin A1C   Date Value Ref Range Status   10/22/2013 7.4 (H) 4.5 - 6.2 % Final     Patient Active Problem List   Diagnosis    Major depressive disorder, recurrent episode, mild    Hypertension    Type 2 diabetes, uncontrolled, with retinopathy    CAD, multiple vessel    S/P CABG x 2    Macular degeneration    Obstructive sleep apnea on CPAP    Anxiety state, unspecified    Insulin long-term use    Primary osteoarthritis of both knees    Chronic low back pain    DJD (degenerative joint disease), lumbar    Type 2 diabetes, uncontrolled, with neuropathy    Bilateral carotid artery disease    Hyperlipidemia LDL goal <100    Type 2 diabetes, uncontrolled, with peripheral circulatory disorder    COPD (chronic obstructive pulmonary disease)    Atherosclerosis of abdominal aorta    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 18 g 0    BD INSULIN PEN NEEDLE UF SHORT 31 gauge x 5/16" Ndle       BD INSULIN SYRINGE ULTRA-FINE 1/2 mL 31 gauge x 15/64" Syrg       carvedilol (COREG) 25 MG tablet Take 25 mg by mouth. 1 Tablet Oral Twice a day       cinnamon bark 500 mg capsule Take 1,000 mg by mouth once daily.        clopidogrel (PLAVIX) 75 mg tablet Take 75 mg by mouth once daily.      diazepam (VALIUM) 2 MG tablet Take 2 mg by mouth continuous prn.      duloxetine (CYMBALTA) 30 MG capsule Take 1 capsule (30 mg total) by mouth once daily. 30 capsule 10    furosemide (LASIX) 40 MG tablet Take 40 mg by mouth once daily.       gabapentin (NEURONTIN) 300 MG capsule TAKE 2 CAPSULES BY MOUTH 3 TIMES DAILY 540 capsule 1    glimepiride (AMARYL) 4 MG tablet Take 8 mg by mouth daily with breakfast.       insulin NPH-insulin regular, 70/30, (NOVOLIN 70/30) 100 unit/mL (70-30) injection Inject into the skin. Inject 5 units at breakfast and inject 10 units at night      insulin syringe-needle U-100 1/2 mL 31 x 5/16" Syrg       losartan (COZAAR) 100 MG tablet Take 1 tablet (100 mg total) by mouth once daily. 90 tablet 3    metformin (GLUCOPHAGE) 500 MG tablet Take 1,000 mg by mouth 2 (two) times daily with meals. 2 Tablets in the morning, 2 Tablets in the evening      nitroGLYCERIN (NITROSTAT) 0.3 MG SL tablet PLACE 1 TABLET UNDER TONGUE AS NEEDED FOR CHEST PAIN EVERY 5 MINUTES, UP TO 3 DOSES IN 15MINUTES  2    OM-3/DHA/EPA/FISH OIL/VIT D3 (FISH OIL-VIT D3 ORAL) Take 2,000 Units by mouth once daily.       polyethylene " glycol (COLYTE) 240-22.72-6.72 -5.84 gram SolR       pravastatin (PRAVACHOL) 20 MG tablet Take 20 mg by mouth once daily.      spironolactone (ALDACTONE) 25 MG tablet 25 mg once daily.       VIT C/VIT E AC/LUT/COPPER/ZINC (PRESERVISION LUTEIN ORAL) Take by mouth.      warfarin (COUMADIN) 5 MG tablet Take 2 tabs by mouth on Tuesday and on all other days .5 tabs by mouth       No current facility-administered medications on file prior to visit.      Review of patient's allergies indicates:   Allergen Reactions    Codeine Nausea Only     weak     Past Surgical History:   Procedure Laterality Date    broken elbow      left    COLONOSCOPY N/A 10/4/2017    Procedure: COLONOSCOPY;  Surgeon: Gabriel Leung MD;  [facility];  Service: Endoscopy;  Laterality: N/A;    EYE SURGERY      cataract    heart bypass      2004    HERNIA REPAIR      right    ROTATOR CUFF REPAIR      right  2004     Family History   Problem Relation Age of Onset    Arthritis Mother     Diabetes Mother     Stroke Mother     Heart attack Father     Cancer Brother     Throat cancer Brother     Diabetes Maternal Aunt     Diabetes Maternal Uncle     Heart disease Maternal Uncle     Diabetes Paternal Aunt     Heart disease Paternal Aunt     Heart disease Paternal Uncle     Heart disease Maternal Grandmother     Cancer Maternal Grandfather      Social History     Social History    Marital status:      Spouse name: N/A    Number of children: 4    Years of education: GED     Occupational History    retired tug       Social History Main Topics    Smoking status: Former Smoker     Packs/day: 3.00     Years: 45.00     Types: Cigarettes     Quit date: 4/20/2004    Smokeless tobacco: Never Used    Alcohol use Yes      Comment: was heavy drinker 35 years ago, rare use now    Drug use: No    Sexual activity: Yes     Partners: Female     Other Topics Concern    Not on file     Social History Narrative     "No narrative on file     Review of Systems   Constitution: Negative for chills, fever and weakness.   Cardiovascular: Negative for claudication and leg swelling.   Respiratory: Positive for cough. Negative for shortness of breath.    Skin: Positive for dry skin and nail changes. Negative for itching and rash.   Musculoskeletal: Positive for arthritis, back pain and joint pain. Negative for falls, joint swelling and muscle weakness.   Gastrointestinal: Negative for diarrhea, nausea and vomiting.   Neurological: Positive for numbness and paresthesias. Negative for tremors.   Psychiatric/Behavioral: Negative for altered mental status and hallucinations.           Objective:       Vitals:    12/04/17 0829   BP: (!) 168/84   Weight: 79.8 kg (176 lb)   Height: 5' 8" (1.727 m)   PainSc: 0-No pain       Physical Exam   Constitutional:   General: Pt. is well-developed, well-nourished, appears stated age, in no acute distress, alert and oriented x 3. No evidence of depression, anxiety, or agitation. Calm, cooperative, and communicative. Appropriate interactions and affect.       Cardiovascular:   Pulses:       Dorsalis pedis pulses are 2+ on the right side, and 2+ on the left side.        Posterior tibial pulses are 1+ on the right side, and 1+ on the left side.   There is decreased digital hair.   Musculoskeletal:        Right foot: There is normal range of motion.        Left foot:  There is normal range of motion.   Muscle strength is 5/5 in all groups bilaterally.    Decreased stride, station of gait.  apropulsive toe off.  Increased angle and base of gait.    Patient has hammertoes of digits 2-5 bilateral partially reducible without symptom today.    Visible and palpable bunion without pain at dorsomedial 1st metatarsal head right and left.  Hallux abducted right and left partially reducible, tracks laterally without being track bound.  No ecchymosis, erythema, edema, or cardinal signs infection or signs of trauma same " foot.     Neurological: A sensory deficit is present.   [place] 5.07 monofilamant testing is diminished Farhad feet. Sharp/dull sensation diminished Bilaterally   Skin: Skin is warm, dry and intact. No abrasion, no ecchymosis, no lesion and no rash noted. He is not diaphoretic. No cyanosis or erythema. No pallor. Nails show no clubbing.   Toenails 1-5 bilaterally are elongated by 2-3 mm, thickened by 2-3 mm, discolored/yellowed, dystrophic, brittle with subungual debris.    Focal hyperkeratotic lesion consisting entirely of hyperkeratotic tissue without open skin, drainage, pus, fluctuance, malodor, or signs of infection: medial IPJ of hallux farhad    Interdigital Spaces clean, dry and without evidence of break in skin integrity   Psychiatric: He has a normal mood and affect. His speech is normal.   Nursing note and vitals reviewed.        Assessment:       Encounter Diagnoses   Name Primary?    Type 2 diabetes, uncontrolled, with peripheral circulatory disorder Yes    Type II diabetes mellitus with neurological manifestations     Hammer toes of both feet     Corn or callus     Onychomycosis due to dermatophyte     Hallux abducto valgus, unspecified laterality          Plan:       Percy was seen today for diabetes mellitus, diabetic foot exam and nail care.    Diagnoses and all orders for this visit:    Type 2 diabetes, uncontrolled, with peripheral circulatory disorder    Type II diabetes mellitus with neurological manifestations    Hammer toes of both feet    Corn or callus    Onychomycosis due to dermatophyte    Hallux abducto valgus, unspecified laterality      I counseled the patient on his conditions, their implications and medical management.      - Shoe inspection. Diabetic Foot Education. Patient reminded of the importance of good nutrition and blood sugar control to help prevent podiatric complications of diabetes. Patient instructed on proper foot hygeine. We discussed wearing proper shoe gear,  daily foot inspections, never walking without protective shoe gear, never putting sharp instruments to feet    - With patient's permission, nails were aggressively reduced and debrided x 10 to their soft tissue attachment mechanically and with electric , removing all offending nail and debris. Patient relates relief following the procedure.     - After cleansing the  area w/ alcohol prep pad the above mentioned hyperkeratosis was trimmed utilizing No 15 scapel, to a smooth base with out incident. Patient tolerated this  well and reported comfort to the area of medial hallux IPJ concha    - He will continue to monitor the areas daily, inspect his feet, wear protective shoe gear when ambulatory, moisturizer to maintain skin integrity and follow in this office in approximately 60-70 days, sooner PRN

## 2017-12-04 NOTE — PATIENT INSTRUCTIONS
Recommend lotions: eucerin, aquaphor, A&D ointment, gold bond for diabetics, sween; urea 40 with aloe (found on amazon.com)    Shoe recommendations: (try 6pm.com, zappos.com , nordstromrack.com, or shoes.com for discounted prices) you can visit [employer] shoes in [place] as well    Asics (GT 2000 or gel foundations), new balance, saucony (stabil c3),  Mahoney (GTS or Beast or transcend), vionic, propet (tennis shoe)    sofft brand, clarks, crocs, aerosoles, naturalizers, SAS, ecco, dean, ngoc lara, rockports (dress shoes)    Vionic, burkenstocks, fitflops, propet (sandals)    Nike comfort thong sandals, crocs, propet (house shoes)    Nail Home remedy:  Vicks Vapor rub to nails for easier managability    Occasional soaks for 15-20 mins in luke warm water with 1 cup of listerine and 1 cup of apple cider vinegar are ok You may add several drops of oil of oregano or tea tree oil as well      Diabetes: Inspecting Your Feet  Diabetes increases your chances of developing foot problems. So inspect your feet every day. This helps you find small skin irritations before they become serious infections. If you have trouble seeing the bottoms of your feet, use a mirror or ask a family member or friend to help.     Pressure spots on the bottom of the foot are common areas where problems develop.   How to check your feet  Below are tips to help you look for foot problems. Try to check your feet at the same time each day, such as when you get out of bed in the morning:  · Check the top of each foot. The tops of toes, back of the heel, and outer edge of the foot can get a lot of rubbing from poor-fitting shoes.  · Check the bottom of each foot. Daily wear and tear often leads to problems at pressure spots.  · Check the toes and nails. Fungal infections often occur between toes. Toenail problems can also be a sign of fungal infections or lead to breaks in the skin.  · Check your shoes, too. Loose objects inside a shoe can injure the foot.  Use your hand to feel inside your shoes for things like alfredo, loose stitching, or rough areas that could irritate your skin.  Warning signs  Look for any color changes in the foot. Redness with streaks can signal a severe infection, which needs immediate medical attention. Tell your doctor right away if you have any of these problems:  · Swelling, sometimes with color changes, may be a sign of poor blood flow or infection. Symptoms include tenderness and an increase in the size of your foot.  · Warm or hot areas on your feet may be signs of infection. A foot that is cold may not be getting enough blood.  · Sensations such as burning, tingling, or pins and needles can be signs of a problem. Also check for areas that may be numb.  · Hot spots are caused by friction or pressure. Look for hot spots in areas that get a lot of rubbing. Hot spots can turn into blisters, calluses, or sores.  · Cracks and sores are caused by dry or irritated skin. They are a sign that the skin is breaking down, which can lead to infection.  · Toenail problems to watch for include nails growing into the skin (ingrown toenail) and causing redness or pain. Thick, yellow, or discolored nails can signal a fungal infection.  · Drainage and odor can develop from untreated sores and ulcers. Call your doctor right away if you notice white or yellow drainage, bleeding, or unpleasant odor.   © 6646-8103 [employer]. [address] [place] [address], [place] [address]. All rights reserved. This information is not intended as a substitute for professional medical care. Always follow your healthcare professional's instructions.

## 2017-12-14 DIAGNOSIS — J44.1 COPD EXACERBATION: ICD-10-CM

## 2017-12-14 RX ORDER — ALBUTEROL SULFATE 90 UG/1
2 AEROSOL, METERED RESPIRATORY (INHALATION) EVERY 6 HOURS PRN
Qty: 18 G | Refills: 0 | Status: SHIPPED | OUTPATIENT
Start: 2017-12-14 | End: 2018-10-17 | Stop reason: SDUPTHER

## 2017-12-14 NOTE — TELEPHONE ENCOUNTER
----- Message from Sylvie Ocampo sent at 12/14/2017 12:59 PM CST -----  Contact: self  Pt calling to request a refill of puffer to be sent to [facility]. Please verify I'm not sure if he is speaking of inhaler. Pt can be reached at [phone].

## 2018-01-08 ENCOUNTER — OFFICE VISIT (OUTPATIENT)
Dept: FAMILY MEDICINE | Facility: CLINIC | Age: 78
End: 2018-01-08
Payer: MEDICARE

## 2018-01-08 VITALS
WEIGHT: 178.69 LBS | HEIGHT: 68 IN | SYSTOLIC BLOOD PRESSURE: 128 MMHG | OXYGEN SATURATION: 96 % | HEART RATE: 65 BPM | TEMPERATURE: 98 F | BODY MASS INDEX: 27.08 KG/M2 | DIASTOLIC BLOOD PRESSURE: 60 MMHG

## 2018-01-08 DIAGNOSIS — Z79.4 CONTROLLED TYPE 2 DIABETES MELLITUS WITH RETINOPATHY, WITH LONG-TERM CURRENT USE OF INSULIN, MACULAR EDEMA PRESENCE UNSPECIFIED, UNSPECIFIED LATERALITY, UNSPECIFIED RETINOPATHY SEVERITY: Primary | ICD-10-CM

## 2018-01-08 DIAGNOSIS — I20.89 CHRONIC STABLE ANGINA: ICD-10-CM

## 2018-01-08 DIAGNOSIS — G47.33 OBSTRUCTIVE SLEEP APNEA ON CPAP: ICD-10-CM

## 2018-01-08 DIAGNOSIS — D69.2 SENILE PURPURA: ICD-10-CM

## 2018-01-08 DIAGNOSIS — J44.9 CHRONIC OBSTRUCTIVE PULMONARY DISEASE, UNSPECIFIED COPD TYPE: ICD-10-CM

## 2018-01-08 DIAGNOSIS — E11.51 TYPE 2 DIABETES, CONTROLLED, WITH PERIPHERAL CIRCULATORY DISORDER: ICD-10-CM

## 2018-01-08 DIAGNOSIS — E11.40 TYPE 2 DIABETES, CONTROLLED, WITH NEUROPATHY: ICD-10-CM

## 2018-01-08 DIAGNOSIS — E11.21 CONTROLLED TYPE 2 DIABETES MELLITUS WITH PROTEINURIC DIABETIC NEPHROPATHY: ICD-10-CM

## 2018-01-08 DIAGNOSIS — E11.319 CONTROLLED TYPE 2 DIABETES MELLITUS WITH RETINOPATHY, WITH LONG-TERM CURRENT USE OF INSULIN, MACULAR EDEMA PRESENCE UNSPECIFIED, UNSPECIFIED LATERALITY, UNSPECIFIED RETINOPATHY SEVERITY: Primary | ICD-10-CM

## 2018-01-08 DIAGNOSIS — R53.83 FATIGUE, UNSPECIFIED TYPE: ICD-10-CM

## 2018-01-08 DIAGNOSIS — I48.0 PAROXYSMAL ATRIAL FIBRILLATION: ICD-10-CM

## 2018-01-08 DIAGNOSIS — I10 ESSENTIAL HYPERTENSION: Chronic | ICD-10-CM

## 2018-01-08 DIAGNOSIS — I70.0 ATHEROSCLEROSIS OF ABDOMINAL AORTA: ICD-10-CM

## 2018-01-08 DIAGNOSIS — Z79.4 INSULIN LONG-TERM USE: ICD-10-CM

## 2018-01-08 DIAGNOSIS — E78.5 HYPERLIPIDEMIA LDL GOAL <100: ICD-10-CM

## 2018-01-08 DIAGNOSIS — F33.0 MAJOR DEPRESSIVE DISORDER, RECURRENT EPISODE, MILD: ICD-10-CM

## 2018-01-08 PROBLEM — Z12.11 COLON CANCER SCREENING: Status: RESOLVED | Noted: 2017-10-04 | Resolved: 2018-01-08

## 2018-01-08 LAB
A1C: 6.8
CHOLEST/HDLC SERPL: 95 {RATIO}
HDLC SERPL-MCNC: 31 MG/DL (ref 35–70)
LDLC SERPL CALC-MCNC: 50 MG/DL
TRIGL SERPL-MCNC: 61 MG/DL (ref 40–160)

## 2018-01-08 PROCEDURE — 99214 OFFICE O/P EST MOD 30 MIN: CPT | Mod: S$GLB,,, | Performed by: INTERNAL MEDICINE

## 2018-01-08 PROCEDURE — 99999 PR PBB SHADOW E&M-EST. PATIENT-LVL III: CPT | Mod: PBBFAC,,, | Performed by: INTERNAL MEDICINE

## 2018-01-08 PROCEDURE — 99499 UNLISTED E&M SERVICE: CPT | Mod: S$GLB,,, | Performed by: INTERNAL MEDICINE

## 2018-01-08 NOTE — PROGRESS NOTES
HISTORY OF PRESENT ILLNESS:  Percy Ramirez is a 77 y.o. male who presents to the clinic today for Hypertension (6 month f/u) and Diabetes (6 month f/u)  .  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  He admits to drinking a beer every once in a while.  He also likes to eat his sweets occasionally.  Last A1c done in October of last year was 6.8.  He is followed by endocrinology.  He is also followed closely by cardiology.  He is on Coumadin for his atrial fibrillation.  He does experience upper extremity bruising.  He had surgery done on his right thumb by orthopedics a few weeks ago.  He is still in a brace.  He is also followed by orthopedics for chronic knee pain.  He has significant osteoarthritis.  He rates his pain today a 10 out of 10.  He denies cardiac chest pain.  He has baseline chronic shortness of breath.  His primary complaint today is chronic fatigue.  He has obstructive sleep apnea and uses his CPAP nightly.  He is otherwise mostly sedentary.      PAST MEDICAL HISTORY:  Past Medical History:   Diagnosis Date    Allergy     Arthritis     CAD, multiple vessel     DR Melissa    Cataract     Depression     Hyperlipidemia     Hypertension     Macular degeneration     Dr Razo for injection, Jennifer for eye    S/P CABG x 2     Type 2 diabetes mellitus with circulatory disorder     Dr. Aquino       PAST SURGICAL HISTORY:  Past Surgical History:   Procedure Laterality Date    broken elbow      left    COLONOSCOPY N/A 10/4/2017    Procedure: COLONOSCOPY;  Surgeon: Gabriel Leung MD;  [facility];  Service: Endoscopy;  Laterality: N/A;    EYE SURGERY      cataract    heart bypass      2004    HERNIA REPAIR      right    ROTATOR CUFF REPAIR      right  2004       SOCIAL HISTORY:  Social History     Social History  "   Marital status:      Spouse name: N/A    Number of children: 4    Years of education: GED     Occupational History    retired tug       Social History Main Topics    Smoking status: Former Smoker     Packs/day: 3.00     Years: 45.00     Types: Cigarettes     Quit date: 4/20/2004    Smokeless tobacco: Never Used    Alcohol use Yes      Comment: was heavy drinker 35 years ago, rare use now    Drug use: No    Sexual activity: Yes     Partners: Female     Other Topics Concern    Not on file     Social History Narrative    No narrative on file       FAMILY HISTORY:  Family History   Problem Relation Age of Onset    Arthritis Mother     Diabetes Mother     Stroke Mother     Heart attack Father     Cancer Brother     Throat cancer Brother     Diabetes Maternal Aunt     Diabetes Maternal Uncle     Heart disease Maternal Uncle     Diabetes Paternal Aunt     Heart disease Paternal Aunt     Heart disease Paternal Uncle     Heart disease Maternal Grandmother     Cancer Maternal Grandfather        ALLERGIES AND MEDICATIONS: updated and reviewed.  Review of patient's allergies indicates:   Allergen Reactions    Codeine Nausea Only     weak    Ranexa [ranolazine]      Medication List with Changes/Refills   Current Medications    ALBUTEROL 90 MCG/ACTUATION INHALER    Inhale 2 puffs into the lungs every 6 (six) hours as needed for Wheezing or Shortness of Breath.    BD INSULIN PEN NEEDLE UF SHORT 31 GAUGE X 5/16" NDLE        BD INSULIN SYRINGE ULTRA-FINE 1/2 ML 31 GAUGE X 15/64" SYRG        CARVEDILOL (COREG) 25 MG TABLET    Take 25 mg by mouth. 1 Tablet Oral Twice a day     CINNAMON BARK 500 MG CAPSULE    Take 1,000 mg by mouth once daily.      CLOPIDOGREL (PLAVIX) 75 MG TABLET    Take 75 mg by mouth once daily.    DIAZEPAM (VALIUM) 2 MG TABLET    Take 2 mg by mouth continuous prn.    DULOXETINE (CYMBALTA) 30 MG CAPSULE    Take 1 capsule (30 mg total) by mouth once daily.    FLUAD " "5325-8564, 65 YR UP,,PF, 45 MCG (15 MCG X 3)/0.5 ML SYRG        FUROSEMIDE (LASIX) 40 MG TABLET    Take 40 mg by mouth once daily.     GABAPENTIN (NEURONTIN) 300 MG CAPSULE    TAKE 2 CAPSULES BY MOUTH 3 TIMES DAILY    GLIMEPIRIDE (AMARYL) 4 MG TABLET    Take 4 mg by mouth daily with breakfast.     INSULIN NPH-INSULIN REGULAR, 70/30, (NOVOLIN 70/30) 100 UNIT/ML (70-30) INJECTION    Inject into the skin. Inject 5 units at breakfast and inject 10 units at night    INSULIN SYRINGE-NEEDLE U-100 1/2 ML 31 X 5/16" SYRG        LOSARTAN (COZAAR) 100 MG TABLET    Take 1 tablet (100 mg total) by mouth once daily.    METFORMIN (GLUCOPHAGE) 500 MG TABLET    Take 1,000 mg by mouth 2 (two) times daily with meals. 2 Tablets in the morning, 2 Tablets in the evening    NITROGLYCERIN (NITROSTAT) 0.3 MG SL TABLET    PLACE 1 TABLET UNDER TONGUE AS NEEDED FOR CHEST PAIN EVERY 5 MINUTES, UP TO 3 DOSES IN 15MINUTES    OM-3/DHA/EPA/FISH OIL/VIT D3 (FISH OIL-VIT D3 ORAL)    Take 2,000 Units by mouth once daily.     PRAVASTATIN (PRAVACHOL) 20 MG TABLET    Take 20 mg by mouth once daily.    SPIRONOLACTONE (ALDACTONE) 25 MG TABLET    25 mg once daily.     VIT C/VIT E AC/LUT/COPPER/ZINC (PRESERVISION LUTEIN ORAL)    Take by mouth.    WARFARIN (COUMADIN) 5 MG TABLET    Take 2 tabs by mouth on Tuesday,Thursday, Saturday and Sundays then 1.5 on all other days.   Discontinued Medications    POLYETHYLENE GLYCOL (COLYTE) 240-22.72-6.72 -5.84 GRAM SOLR              CARE TEAM:  Patient Care Team:  Kasie Edmondson MD as PCP - General (Internal Medicine)  Jac Rodriguez OD as Consulting Provider (Optometry)  Trever Arevalo MD as Consulting Physician (Ophthalmology)  Philippe Aquino MD as Consulting Physician (Endocrinology)  Mac Valderrama MD as Consulting Physician (Cardiology)  Thierno Santiago MD as Consulting Physician (Cardiology)  Marifer Romero DPM as Consulting Physician (Podiatry)         REVIEW OF SYSTEMS:  Review of Systems " "  Constitutional: Positive for malaise/fatigue (chronic fatigue). Negative for chills, fever and weight loss.   HENT: Negative for congestion, ear pain, nosebleeds and sore throat (or Oral Lesions).    Eyes: Negative for blurred vision, pain and discharge.        Chronic poor vision   Respiratory: Negative for cough, shortness of breath and wheezing.    Cardiovascular: Negative for chest pain, palpitations and leg swelling.   Gastrointestinal: Negative for abdominal pain, blood in stool, constipation, diarrhea, heartburn, nausea and vomiting.   Genitourinary: Negative for dysuria, frequency, hematuria and urgency.   Musculoskeletal: Positive for joint pain (right wrist in brace - had surgery on thumb a few weeks ago; chronic knee pain - followed by orthopaedics). Negative for falls (or Gait Disturbance) and myalgias.   Skin: Negative for itching (or Hives) and rash.   Neurological: Negative for dizziness, focal weakness, seizures, loss of consciousness and headaches.   Endo/Heme/Allergies: Negative for environmental allergies and polydipsia (or Temperature Intolerance). Does not bruise/bleed easily (or Lymphadenopathy).   Psychiatric/Behavioral: Positive for depression. Negative for memory loss and substance abuse. The patient is not nervous/anxious and does not have insomnia.          PHYSICAL EXAM:  Vitals:    01/08/18 0854   BP: 128/60   Pulse: 65   Temp: 98.4 °F (36.9 °C)     Weight: 81.1 kg (178 lb 10.9 oz)   Height: 5' 8" (172.7 cm)   Body mass index is 27.17 kg/m².    Physical Examination: General appearance - alert, well appearing, and in no distress and overweight  Mental status - alert, oriented to person, place, and time, normal mood, behavior, speech, dress, motor activity, and thought processes  Eyes - pupils equal and reactive, extraocular eye movements intact, sclera anicteric  Mouth - mucous membranes moist, pharynx normal without lesions  Neck - supple, no significant adenopathy, carotids upstroke " normal bilaterally, no bruits, thyroid exam: thyroid is normal in size without nodules or tenderness  Lymphatics - no palpable lymphadenopathy  Chest - clear to auscultation, no wheezes, rales or rhonchi, symmetric air entry  Heart - normal rate and regular rhythm, no gallops noted  Neurological - alert, oriented, normal speech, no focal findings or movement disorder noted, cranial nerves II through XII intact  Musculoskeletal - no muscular tenderness noted, Moderate-Severe osteoarthritic changes noted to both knee joints. No joint effusions noted.   Extremities - no pedal edema noted  Skin - normal coloration and turgor, no rashes, no suspicious skin lesions noted       ASSESSMENT AND PLAN:  1. Controlled type 2 diabetes mellitus with retinopathy, with long-term current use of insulin, macular edema presence unspecified, unspecified laterality, unspecified retinopathy severity/2. Type 2 diabetes, controlled, with neuropathy/3. Type 2 diabetes, controlled, with peripheral circulatory disorder/4. 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 Insulin long-term use  Diabetes currently is controlled. We discussed diabetic diet and regular exercise. We discussed home blood sugar monitoring, if appropriate. The current medical regimen is effective;  continue present plan and medications. Followed by endocrinology. He is up to date on his eye exam. Neuropathy pain controlled. He denies claudication symptoms at this time. Stable kidney function. Observe. Patient counseled to avoid/minimize the use of anti-inflammatory  Medication. Discussed to stay well hydrated. Also discussed with patient that good control of blood pressure or diabetes, if present, will help to prevent progression.     6. Essential hypertension  Discussed sodium restriction, maintaining ideal body weight and regular exercise program as physiologic means to achieve blood pressure control. The patient will strive towards this.  The current medical regimen is effective;  continue present plan and medications. Recommended patient to check home readings to monitor and see me for followup as scheduled or sooner as needed. Patient was educated that both decongestant and anti-inflammatory medication may raise blood pressure.     7. Chronic stable angina/8. Paroxysmal atrial fibrillation  Currently in NSR. On coumadin. Followed by cardiology.    9. Hyperlipidemia LDL goal <100  We discussed low fat diet and regular exercise.The current medical regimen is effective;  continue present plan and medications.      10. Obstructive sleep apnea on CPAP  We discussed the potential ramifications of untreated sleep apnea, which could include daytime sleepiness, hypertension, heart disease including CHF, sudden death while sleeping and/or stroke. The patient was advised to abstain from driving should they feel sleepy or drowsy.  We discussed potential treatment options, which could include weight loss, body positioning, continuous positive airway pressure (CPAP), or referral for surgical consideration.      11. Chronic obstructive pulmonary disease, unspecified COPD type  We discussed low fat diet and regular exercise. Patient is statin intolerant.     12. Atherosclerosis of abdominal aorta  Patient with Atherosclerosis of the Aorta.  Stable/asymptomatic. Currently stable on lipid lowering medication and b/p monitoring.     13. Senile purpura  Stable. Observe.     14. Major depressive disorder, recurrent episode, mild  The current medical regimen is effective;  continue present plan and medications.     15. Fatigue, unspecified type  Likely multifactorial in etiology.  He does not wish to do another sleep study at this time.  I discussed with him to increase his regular activity level.  He should focus more protein in his diet.  Otherwise observe.  Reassess at next office visit.           Return in about 6 months (around 7/8/2018), or if symptoms worsen or  fail to improve, for annual exam. or sooner as needed.

## 2018-01-09 ENCOUNTER — TELEPHONE (OUTPATIENT)
Dept: FAMILY MEDICINE | Facility: CLINIC | Age: 78
End: 2018-01-09

## 2018-01-09 NOTE — TELEPHONE ENCOUNTER
----- Message from Miya Salas sent at 1/9/2018 12:22 PM CST -----  Contact: [phone]/PT  Calling to speak with nurse regarding question /concerns hepatitis

## 2018-01-10 NOTE — TELEPHONE ENCOUNTER
Spoke w/patient, informed of response. States if everything has been ok, he will not worry about testing.

## 2018-01-10 NOTE — TELEPHONE ENCOUNTER
Spoke w/patient, states he was wondering if he should be tested or have blood work done for Hep-c. Patient states he saw the commercial on TV regarding baby-boomers and Hep-C. Please advise.

## 2018-01-10 NOTE — TELEPHONE ENCOUNTER
The patient is correct that we have been screening all baby boomers which are those people born between [id number] - so he is technically not considered in the right age range. His liver enzymes have always been normal so it is unlikely that he has hepatitis C but I am happy to test him, if he desires.

## 2018-01-23 LAB
ALBUMIN/GLOBULIN RATIO: 1.7 (ref 1–2.5)
ALBUMIN: 3.9 (ref 3.6–5.1)
ALP ISOS SERPL LEV INH-CCNC: 48 U/L (ref 40–115)
ALT SERPL-CCNC: 13 U/L (ref 9–46)
AST SERPL-CCNC: 13 U/L (ref 10–35)
BILIRUBIN, TOTAL: 0.4 (ref 0.2–1.2)
BUN/CREAT SERPL: 12 (ref 6–22)
CALCIUM SERPL-MCNC: 8.5 MG/DL (ref 8.6–10.3)
CARBON DIOXIDE, CO2: 24 (ref 20–31)
CHLORIDE: 103 (ref 98–110)
CREAT SERPL-MCNC: 1.3 MG/DL (ref 0.7–1.2)
EGFR IF AFRICAN AMERICAN: 62
EST. GFR  (NON AFRICAN AMERICAN): 54 MG/DL
GLOBULIN: 2.3 (ref 1.9–3.7)
GLUCOSE: 136 (ref 65–99)
HBA1C MFR BLD: 6.9 % (ref 4–5.6)
POTASSIUM: 4.8 (ref 3.5–5.3)
PROT SNV-MCNC: 6.2 G/L (ref 6.1–8.1)
SODIUM: 136 (ref 135–146)
UREA NITROGEN (BUN): 16 (ref 7–25)

## 2018-01-24 ENCOUNTER — TELEPHONE (OUTPATIENT)
Dept: FAMILY MEDICINE | Facility: CLINIC | Age: 78
End: 2018-01-24

## 2018-01-24 LAB
CHOL/HDLC RATIO: 3.5
CHOLESTEROL, TOTAL: 109 (ref 125–199)
CREATININE RANDOM URINE: 74 MG/DL (ref 20–370)
HDLC SERPL-MCNC: 31 MG/DL
LDLC SERPL CALC-MCNC: 63 MG/DL
MICROALBUMIN URINE: 1
MICROALBUMIN/CREATININE RATIO: 14
NONHDLC SERPL-MCNC: 78 MG/DL
TRIGL SERPL-MCNC: 72 MG/DL

## 2018-01-24 NOTE — TELEPHONE ENCOUNTER
----- Message from Ricky Mesa sent at 1/24/2018  9:45 AM CST -----  Contact: Self  Pt states he fell down and his arm is bleeding. Pt would like to be advised on whether or not he needs to come in for apt.

## 2018-01-24 NOTE — TELEPHONE ENCOUNTER
I spoke with the patient and recommended applying pressure for 15- 20 minutes and apply telfa a non-stick guaze and then apply a pressure dressing with a ace wrap. Patient verbalized understanding of above.

## 2018-01-24 NOTE — TELEPHONE ENCOUNTER
Spoke w/patient,requesting advice. Can he use Silver Nitrate liquid if available in liquid form on a (R) elbow skin tear to completely stop the bleeding because he is on Plavix. States yesterday he hit his right elbow on the door frame and sustained a medium size skin tear that cause a good amount of bleeding. States he wrapped it with gauzed and this morning it is not bleeding as much. Please advise.

## 2018-02-08 ENCOUNTER — OFFICE VISIT (OUTPATIENT)
Dept: PODIATRY | Facility: CLINIC | Age: 78
End: 2018-02-08
Payer: MEDICARE

## 2018-02-08 VITALS
WEIGHT: 178 LBS | BODY MASS INDEX: 26.98 KG/M2 | DIASTOLIC BLOOD PRESSURE: 70 MMHG | SYSTOLIC BLOOD PRESSURE: 156 MMHG | HEIGHT: 68 IN

## 2018-02-08 DIAGNOSIS — M20.11 HALLUX ABDUCTO VALGUS, RIGHT: ICD-10-CM

## 2018-02-08 DIAGNOSIS — E11.49 TYPE II DIABETES MELLITUS WITH NEUROLOGICAL MANIFESTATIONS: ICD-10-CM

## 2018-02-08 DIAGNOSIS — B35.1 ONYCHOMYCOSIS DUE TO DERMATOPHYTE: ICD-10-CM

## 2018-02-08 DIAGNOSIS — M20.12 HALLUX ABDUCTO VALGUS, LEFT: ICD-10-CM

## 2018-02-08 DIAGNOSIS — M20.41 HAMMER TOES OF BOTH FEET: ICD-10-CM

## 2018-02-08 DIAGNOSIS — L84 CORN OR CALLUS: ICD-10-CM

## 2018-02-08 DIAGNOSIS — M20.42 HAMMER TOES OF BOTH FEET: ICD-10-CM

## 2018-02-08 PROCEDURE — 11721 DEBRIDE NAIL 6 OR MORE: CPT | Mod: 59,Q9,S$GLB, | Performed by: PODIATRIST

## 2018-02-08 PROCEDURE — 99999 PR PBB SHADOW E&M-EST. PATIENT-LVL III: CPT | Mod: PBBFAC,,, | Performed by: PODIATRIST

## 2018-02-08 PROCEDURE — 99499 UNLISTED E&M SERVICE: CPT | Mod: S$GLB,,, | Performed by: PODIATRIST

## 2018-02-08 PROCEDURE — 11056 PARNG/CUTG B9 HYPRKR LES 2-4: CPT | Mod: Q9,S$GLB,, | Performed by: PODIATRIST

## 2018-02-08 NOTE — PROGRESS NOTES
Subjective:      Patient ID: Percy Ramirez is a 77 y.o. male.    Chief Complaint: Diabetes Mellitus (pcp dr. Edmondson 1/8/18); Diabetic Foot Exam; and Nail Care    Percy is a 77 y.o. male who presents to the clinic for evaluation and treatment of high risk feet. Percy has a past medical history of Allergy; Arthritis; CAD, multiple vessel; Cataract; Depression; Hyperlipidemia; Hypertension; Macular degeneration; S/P CABG x 2; and Type 2 diabetes mellitus with circulatory disorder. The patient's chief complaint is elongated, thickened toenails aggravated by increased weight bearing, shoe gear, pressure.    Patient admits to barefoot walking often in and around home    This patient has documented high risk feet requiring routine maintenance secondary to diabetes mellitis and those secondary complications of diabetes, as mentioned..    PCP: Kasie Edmondson MD    Date Last Seen by PCP:   Chief Complaint   Patient presents with    Diabetes Mellitus     pcp dr. Edmondson 1/8/18    Diabetic Foot Exam    Nail Care     Current shoe gear:  slippers    Hemoglobin A1C   Date Value Ref Range Status   10/22/2013 7.4 (H) 4.5 - 6.2 % Final     Patient Active Problem List   Diagnosis    Major depressive disorder, recurrent episode, mild    Hypertension    Type 2 diabetes, uncontrolled, with retinopathy    CAD, multiple vessel    S/P CABG x 2    Macular degeneration    Obstructive sleep apnea on CPAP    Anxiety state, unspecified    Insulin long-term use    Primary osteoarthritis of both knees    Chronic low back pain    DJD (degenerative joint disease), lumbar    Type 2 diabetes, uncontrolled, with neuropathy    Bilateral carotid artery disease    Hyperlipidemia LDL goal <100    Type 2 diabetes, uncontrolled, with peripheral circulatory disorder    COPD (chronic obstructive pulmonary disease)    Atherosclerosis of abdominal aorta    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 18 g 0    BD INSULIN PEN NEEDLE UF SHORT 31 gauge x 5/16" Ndle       BD INSULIN SYRINGE ULTRA-FINE 1/2 mL 31 gauge x 15/64" Syrg       carvedilol (COREG) 25 MG tablet Take 25 mg by mouth. 1 Tablet Oral Twice a day       cinnamon bark 500 mg capsule Take 1,000 mg by mouth once daily.        clopidogrel (PLAVIX) 75 mg tablet Take 75 mg by mouth once daily.      diazepam (VALIUM) 2 MG tablet Take 2 mg by mouth continuous prn.      duloxetine (CYMBALTA) 30 MG capsule Take 1 capsule (30 mg total) by mouth once daily. 30 capsule 10    FLUAD 2666-1796, 65 YR UP,,PF, 45 mcg (15 mcg x 3)/0.5 mL Syrg       furosemide (LASIX) 40 MG tablet Take 40 mg by mouth once daily.       gabapentin (NEURONTIN) 300 MG capsule TAKE 2 CAPSULES BY MOUTH 3 TIMES DAILY 540 capsule 1    glimepiride (AMARYL) 4 MG tablet Take 4 mg by mouth daily with breakfast.       insulin NPH-insulin regular, 70/30, (NOVOLIN 70/30) 100 unit/mL (70-30) injection Inject into the skin. Inject 5 units at breakfast and inject 10 units at night      insulin syringe-needle U-100 1/2 mL 31 x 5/16" Syrg       losartan (COZAAR) 100 MG tablet Take 1 tablet (100 mg total) by mouth once daily. 90 tablet 3    metformin (GLUCOPHAGE) 500 MG tablet Take 1,000 mg by mouth 2 (two) times daily with meals. 2 Tablets in the morning, 2 Tablets in the evening      nitroGLYCERIN (NITROSTAT) 0.3 MG SL tablet PLACE 1 TABLET UNDER TONGUE AS NEEDED FOR CHEST PAIN EVERY 5 MINUTES, UP TO 3 DOSES IN 15MINUTES  2    OM-3/DHA/EPA/FISH OIL/VIT D3 (FISH OIL-VIT D3 ORAL) Take 2,000 " Units by mouth once daily.       pravastatin (PRAVACHOL) 20 MG tablet Take 20 mg by mouth once daily.      spironolactone (ALDACTONE) 25 MG tablet 25 mg once daily.       VIT C/VIT E AC/LUT/COPPER/ZINC (PRESERVISION LUTEIN ORAL) Take by mouth.      warfarin (COUMADIN) 5 MG tablet Take 2 tabs by mouth on Tuesday,Thursday, Saturday and Sundays then 1.5 on all other days.       No current facility-administered medications on file prior to visit.      Review of patient's allergies indicates:   Allergen Reactions    Codeine Nausea Only     weak     Past Surgical History:   Procedure Laterality Date    broken elbow      left    COLONOSCOPY N/A 10/4/2017    Procedure: COLONOSCOPY;  Surgeon: Gabriel Leung MD;  Location: [facility];  Service: Endoscopy;  Laterality: N/A;    EYE SURGERY      cataract    heart bypass      2004    HERNIA REPAIR      right    ROTATOR CUFF REPAIR      right  2004     Family History   Problem Relation Age of Onset    Arthritis Mother     Diabetes Mother     Stroke Mother     Heart attack Father     Cancer Brother     Throat cancer Brother     Diabetes Maternal Aunt     Diabetes Maternal Uncle     Heart disease Maternal Uncle     Diabetes Paternal Aunt     Heart disease Paternal Aunt     Heart disease Paternal Uncle     Heart disease Maternal Grandmother     Cancer Maternal Grandfather      Social History     Social History    Marital status:      Spouse name: N/A    Number of children: 4    Years of education: GED     Occupational History    retired tug       Social History Main Topics    Smoking status: Former Smoker     Packs/day: 3.00     Years: 45.00     Types: Cigarettes     Quit date: 4/20/2004    Smokeless tobacco: Never Used    Alcohol use Yes      Comment: was heavy drinker 35 years ago, rare use now    Drug use: No    Sexual activity: Yes     Partners: Female     Other Topics Concern    Not on file     Social History Narrative     "No narrative on file     Review of Systems   Constitution: Negative for chills, fever and weakness.   Cardiovascular: Negative for claudication and leg swelling.   Respiratory: Positive for cough. Negative for shortness of breath.    Skin: Positive for dry skin and nail changes. Negative for itching and rash.   Musculoskeletal: Positive for arthritis, back pain and joint pain. Negative for falls, joint swelling and muscle weakness.   Gastrointestinal: Negative for diarrhea, nausea and vomiting.   Neurological: Positive for numbness and paresthesias. Negative for tremors.   Psychiatric/Behavioral: Negative for altered mental status and hallucinations.           Objective:       Vitals:    02/08/18 1515   BP: (!) 156/70   Weight: 80.7 kg (178 lb)   Height: 5' 8" (1.727 m)   PainSc: 0-No pain       Physical Exam   Constitutional:   General: Pt. is well-developed, well-nourished, appears stated age, in no acute distress, alert and oriented x 3. No evidence of depression, anxiety, or agitation. Calm, cooperative, and communicative. Appropriate interactions and affect.       Cardiovascular:   Pulses:       Dorsalis pedis pulses are 2+ on the right side, and 2+ on the left side.        Posterior tibial pulses are 1+ on the right side, and 1+ on the left side.   There is decreased digital hair.   Musculoskeletal:        Right foot: There is normal range of motion.        Left foot:  There is normal range of motion.   Muscle strength is 5/5 in all groups bilaterally.    Decreased stride, station of gait.  apropulsive toe off.  Increased angle and base of gait.    Patient has hammertoes of digits 2-5 bilateral partially reducible without symptom today.    Visible and palpable bunion without pain at dorsomedial 1st metatarsal head right and left.  Hallux abducted right and left partially reducible, tracks laterally without being track bound.  No ecchymosis, erythema, edema, or cardinal signs infection or signs of trauma same " foot.     Neurological: A sensory deficit is present.   [place] 5.07 monofilamant testing is diminished Farhad feet. Sharp/dull sensation diminished Bilaterally   Skin: Skin is warm, dry and intact. No abrasion, no ecchymosis, no lesion and no rash noted. He is not diaphoretic. No cyanosis or erythema. No pallor. Nails show no clubbing.   Toenails 1-5 bilaterally are elongated by 2-3 mm, thickened by 2-3 mm, discolored/yellowed, dystrophic, brittle with subungual debris.    Focal hyperkeratotic lesion consisting entirely of hyperkeratotic tissue without open skin, drainage, pus, fluctuance, malodor, or signs of infection: medial IPJ of hallux farhad    Interdigital Spaces clean, dry and without evidence of break in skin integrity   Psychiatric: He has a normal mood and affect. His speech is normal.   Nursing note and vitals reviewed.        Assessment:       Encounter Diagnoses   Name Primary?    Type 2 diabetes, uncontrolled, with peripheral circulatory disorder Yes    Type II diabetes mellitus with neurological manifestations     Hammer toes of both feet     Hallux abducto valgus, left     Hallux abducto valgus, right     Onychomycosis due to dermatophyte     Corn or callus          Plan:       Percy was seen today for diabetes mellitus, diabetic foot exam and nail care.    Diagnoses and all orders for this visit:    Type 2 diabetes, uncontrolled, with peripheral circulatory disorder    Type II diabetes mellitus with neurological manifestations    Hammer toes of both feet    Hallux abducto valgus, left    Hallux abducto valgus, right    Onychomycosis due to dermatophyte    Corn or callus      I counseled the patient on his conditions, their implications and medical management.      - Shoe inspection. Diabetic Foot Education. Patient reminded of the importance of good nutrition and blood sugar control to help prevent podiatric complications of diabetes. Patient instructed on proper foot hygeine. We  discussed wearing proper shoe gear, daily foot inspections, never walking without protective shoe gear, never putting sharp instruments to feet    - Rx topical pain cream from [facility] to be delivered to patient home for neuropathic pain    - With patient's permission, nails were aggressively reduced and debrided x 10 to their soft tissue attachment mechanically and with electric , removing all offending nail and debris. Patient relates relief following the procedure.     - After cleansing the  area w/ alcohol prep pad the above mentioned hyperkeratosis was trimmed utilizing No 15 scapel, to a smooth base with out incident. Patient tolerated this  well and reported comfort to the area of medial hallux IPJ concha    - He will continue to monitor the areas daily, inspect his feet, wear protective shoe gear when ambulatory, moisturizer to maintain skin integrity and follow in this office in approximately 60-70 days, sooner PRN

## 2018-02-08 NOTE — PATIENT INSTRUCTIONS
Over the counter pain cream: Biofreeze, Bengay, tiger balm, two old goat, lidocaine gel,  Absorbine Veterinary Liniment Gel Topical Analgesic Sore Muscle and Joint Pain Relief    Recommend lotions: eucerin, eucerin for diabetics, aquaphor, A&D ointment, gold bond for diabetics, sween, [place]'s Bees all purpose baby ointment,  urea 40 with aloe (found on amazon.com)    Shoe recommendations: (try 6pm.com, zappos.Yactraq Online , nordstromrack.Yactraq Online, or shoes.Yactraq Online for discounted prices) you can visit DSW shoes in [place]  or [employer] in the [facility] (there are also several shoe brand outlets in the [facility])    Asics (GT 2000 or gel foundations), new balance stability type shoes, saucony (stabil c3),  Mahoney (GTS or Beast or transcend), vionic, propet (tennis shoe)    sofft brand, clarks, crocs, aerosoles, naturalizers, SAS, ecco, born, ngoc lara, rockports (dress shoes)    Vionic, burkenstocks, fitflops, propet (sandals)  Nike comfort thong sandals, crocs, propet (house shoes)    Nail Home remedy:  Vicks Vapor rub to nails for easier managability    Occasional soaks for 15-20 mins in luke warm water with 1 cup of listerine and 1 cup of apple cider vinegar are ok You may add several drops of oil of oregano or tea tree oil as well        Diabetes: Inspecting Your Feet  Diabetes increases your chances of developing foot problems. So inspect your feet every day. This helps you find small skin irritations before they become serious infections. If you have trouble seeing the bottoms of your feet, use a mirror or ask a family member or friend to help.     Pressure spots on the bottom of the foot are common areas where problems develop.   How to check your feet  Below are tips to help you look for foot problems. Try to check your feet at the same time each day, such as when you get out of bed in the morning:  · Check the top of each foot. The tops of toes, back of the heel, and outer edge of the foot can get a lot of rubbing from  poor-fitting shoes.  · Check the bottom of each foot. Daily wear and tear often leads to problems at pressure spots.  · Check the toes and nails. Fungal infections often occur between toes. Toenail problems can also be a sign of fungal infections or lead to breaks in the skin.  · Check your shoes, too. Loose objects inside a shoe can injure the foot. Use your hand to feel inside your shoes for things like alfredo, loose stitching, or rough areas that could irritate your skin.  Warning signs  Look for any color changes in the foot. Redness with streaks can signal a severe infection, which needs immediate medical attention. Tell your doctor right away if you have any of these problems:  · Swelling, sometimes with color changes, may be a sign of poor blood flow or infection. Symptoms include tenderness and an increase in the size of your foot.  · Warm or hot areas on your feet may be signs of infection. A foot that is cold may not be getting enough blood.  · Sensations such as burning, tingling, or pins and needles can be signs of a problem. Also check for areas that may be numb.  · Hot spots are caused by friction or pressure. Look for hot spots in areas that get a lot of rubbing. Hot spots can turn into blisters, calluses, or sores.  · Cracks and sores are caused by dry or irritated skin. They are a sign that the skin is breaking down, which can lead to infection.  · Toenail problems to watch for include nails growing into the skin (ingrown toenail) and causing redness or pain. Thick, yellow, or discolored nails can signal a fungal infection.  · Drainage and odor can develop from untreated sores and ulcers. Call your doctor right away if you notice white or yellow drainage, bleeding, or unpleasant odor.   © 3103-9429 [employer]. [address] [place] [address], [place] [address]. All rights reserved. This information is not intended as a substitute for professional medical care. Always follow your healthcare  professional's instructions.        Step-by-Step:  Inspecting Your Feet (Diabetes)    Date Last Reviewed: 10/1/2016  © 4369-6711 [employer]. [address] [place] [address], [place] [address]. All rights reserved. This information is not intended as a substitute for professional medical care. Always follow your healthcare professional's instructions.

## 2018-02-16 ENCOUNTER — TELEPHONE (OUTPATIENT)
Dept: FAMILY MEDICINE | Facility: CLINIC | Age: 78
End: 2018-02-16

## 2018-02-16 NOTE — TELEPHONE ENCOUNTER
Spoke w/patient, want to know if he can increase his albuterol puffs to 3 puffs every six hours instead of two puffs. States for the last two days he's been experiencing head and chest congestion with clear in color phlegm. No N/V, diarrhea or fever/chills stated. Tried OTC Coricidin but not helping. Please advise.

## 2018-02-16 NOTE — TELEPHONE ENCOUNTER
----- Message from Robert Velásquez sent at 2/16/2018  9:38 AM CST -----  Contact: self  Pt has additional questions regarding albuterol 90 mcg/actuation inhaler. Please contac pt and advise at [phone].    Thanks-

## 2018-02-16 NOTE — TELEPHONE ENCOUNTER
Spoke with patient and advised of otc medications. He is feeling a little better. He will call if he does not continue to get better.

## 2018-03-06 ENCOUNTER — TELEPHONE (OUTPATIENT)
Dept: FAMILY MEDICINE | Facility: CLINIC | Age: 78
End: 2018-03-06

## 2018-03-06 DIAGNOSIS — M17.0 PRIMARY OSTEOARTHRITIS OF BOTH KNEES: Primary | ICD-10-CM

## 2018-03-06 NOTE — TELEPHONE ENCOUNTER
----- Message from Brandon Julien sent at 3/6/2018 11:09 AM CST -----  Contact: [facility] [phone] or FAX:[phone]  Medical necessity form for a walking cane is needed for the pt. Please call at your earliest convenience.

## 2018-03-16 ENCOUNTER — TELEPHONE (OUTPATIENT)
Dept: FAMILY MEDICINE | Facility: CLINIC | Age: 78
End: 2018-03-16

## 2018-03-16 NOTE — TELEPHONE ENCOUNTER
Spoke w/patient, states he has been having diarrhea every morning  For one month on and off. States he has not starting any new medication or changed his diet in any way.states he tried pepto mismol and it work. States he will call office if it continues and schedule OV.

## 2018-03-16 NOTE — TELEPHONE ENCOUNTER
----- Message from Yrn Villalobos sent at 3/16/2018 11:02 AM CDT -----  Contact: Self/[phone]  Patient called stating that he's been experiencing diarrhea every morning. He would like to speak to the staff regarding this matter. Thank you.

## 2018-04-03 ENCOUNTER — TELEPHONE (OUTPATIENT)
Dept: FAMILY MEDICINE | Facility: CLINIC | Age: 78
End: 2018-04-03

## 2018-04-03 DIAGNOSIS — G47.33 OBSTRUCTIVE SLEEP APNEA ON CPAP: Primary | ICD-10-CM

## 2018-04-11 ENCOUNTER — OFFICE VISIT (OUTPATIENT)
Dept: PODIATRY | Facility: CLINIC | Age: 78
End: 2018-04-11
Payer: MEDICARE

## 2018-04-11 VITALS
HEIGHT: 68 IN | BODY MASS INDEX: 26.98 KG/M2 | SYSTOLIC BLOOD PRESSURE: 152 MMHG | DIASTOLIC BLOOD PRESSURE: 74 MMHG | WEIGHT: 178 LBS

## 2018-04-11 DIAGNOSIS — B35.1 ONYCHOMYCOSIS DUE TO DERMATOPHYTE: ICD-10-CM

## 2018-04-11 DIAGNOSIS — L84 CORN OR CALLUS: ICD-10-CM

## 2018-04-11 DIAGNOSIS — E11.49 TYPE II DIABETES MELLITUS WITH NEUROLOGICAL MANIFESTATIONS: ICD-10-CM

## 2018-04-11 PROCEDURE — 99499 UNLISTED E&M SERVICE: CPT | Mod: S$GLB,,, | Performed by: PODIATRIST

## 2018-04-11 PROCEDURE — 11056 PARNG/CUTG B9 HYPRKR LES 2-4: CPT | Mod: Q9,S$GLB,, | Performed by: PODIATRIST

## 2018-04-11 PROCEDURE — 99999 PR PBB SHADOW E&M-EST. PATIENT-LVL III: CPT | Mod: PBBFAC,,, | Performed by: PODIATRIST

## 2018-04-11 PROCEDURE — 11721 DEBRIDE NAIL 6 OR MORE: CPT | Mod: 59,Q9,S$GLB, | Performed by: PODIATRIST

## 2018-04-11 NOTE — PATIENT INSTRUCTIONS
Recommend lotions: eucerin, eucerin for diabetics, aquaphor, A&D ointment, gold bond for diabetics, sween, [place]'s Bees all purpose baby ointment,  urea 40 with aloe (found on amazon.com)    Shoe recommendations: (try 6pm.com, zappos.com , nordstromrack."Blood Monitoring [employer].", or shoes."[employer] for discounted prices) you can visit DSW shoes in [place]  or [facility] in the [facility] (there are also several shoe brand outlets in the [facility])    Asics (GT 2000 or gel foundations), new balance stability type shoes, saucony (stabil c3),  Mahoney (GTS or Beast or transcend), vionic, propet (tennis shoe)    sofft brand, clarks, crocs, aerosoles, naturalizers, SAS, ecco, born, ngoc lara, rockports (dress shoes)    Vionic, burkenstocks, fitflops, propet (sandals)  Nike comfort thong sandals, crocs, propet (house shoes)    Nail Home remedy:  Vicks Vapor rub to nails for easier managability    Occasional soaks for 15-20 mins in luke warm water with 1 cup of listerine and 1 cup of apple cider vinegar are ok You may add several drops of oil of oregano or tea tree oil as well        Diabetes: Inspecting Your Feet  Diabetes increases your chances of developing foot problems. So inspect your feet every day. This helps you find small skin irritations before they become serious infections. If you have trouble seeing the bottoms of your feet, use a mirror or ask a family member or friend to help.     Pressure spots on the bottom of the foot are common areas where problems develop.   How to check your feet  Below are tips to help you look for foot problems. Try to check your feet at the same time each day, such as when you get out of bed in the morning:  · Check the top of each foot. The tops of toes, back of the heel, and outer edge of the foot can get a lot of rubbing from poor-fitting shoes.  · Check the bottom of each foot. Daily wear and tear often leads to problems at pressure spots.  · Check the toes and nails. Fungal infections often occur  between toes. Toenail problems can also be a sign of fungal infections or lead to breaks in the skin.  · Check your shoes, too. Loose objects inside a shoe can injure the foot. Use your hand to feel inside your shoes for things like alfredo, loose stitching, or rough areas that could irritate your skin.  Warning signs  Look for any color changes in the foot. Redness with streaks can signal a severe infection, which needs immediate medical attention. Tell your doctor right away if you have any of these problems:  · Swelling, sometimes with color changes, may be a sign of poor blood flow or infection. Symptoms include tenderness and an increase in the size of your foot.  · Warm or hot areas on your feet may be signs of infection. A foot that is cold may not be getting enough blood.  · Sensations such as burning, tingling, or pins and needles can be signs of a problem. Also check for areas that may be numb.  · Hot spots are caused by friction or pressure. Look for hot spots in areas that get a lot of rubbing. Hot spots can turn into blisters, calluses, or sores.  · Cracks and sores are caused by dry or irritated skin. They are a sign that the skin is breaking down, which can lead to infection.  · Toenail problems to watch for include nails growing into the skin (ingrown toenail) and causing redness or pain. Thick, yellow, or discolored nails can signal a fungal infection.  · Drainage and odor can develop from untreated sores and ulcers. Call your doctor right away if you notice white or yellow drainage, bleeding, or unpleasant odor.   © 6910-7862 [employer]. [address] [place] [address]. All rights reserved. This information is not intended as a substitute for professional medical care. Always follow your healthcare professional's instructions.        Step-by-Step:  Inspecting Your Feet (Diabetes)    Date Last Reviewed: 10/1/2016  © 4575-4235 [employer]. [address] [place] [address]  Road, [place] [address]. All rights reserved. This information is not intended as a substitute for professional medical care. Always follow your healthcare professional's instructions.

## 2018-04-11 NOTE — PROGRESS NOTES
Subjective:      Patient ID: Percy Ramirez is a 77 y.o. male.    Chief Complaint: Diabetes Mellitus (pcp Delvis); Diabetic Foot Exam; and Nail Care    Percy is a 77 y.o. male who presents to the clinic for evaluation and treatment of high risk feet. Percy has a past medical history of Allergy; Arthritis; CAD, multiple vessel; Cataract; Depression; Hyperlipidemia; Hypertension; Macular degeneration; S/P CABG x 2; and Type 2 diabetes mellitus with circulatory disorder. The patient's chief complaint is elongated, thickened toenails aggravated by increased weight bearing, shoe gear, pressure.    Patient admits to barefoot walking often in and around home    This patient has documented high risk feet requiring routine maintenance secondary to diabetes mellitis and those secondary complications of diabetes, as mentioned..    PCP: Kasie Edmondson MD    Date Last Seen by PCP:   Chief Complaint   Patient presents with    Diabetes Mellitus     pcp Delvis    Diabetic Foot Exam    Nail Care     Current shoe gear:  slippers    Hemoglobin A1C   Date Value Ref Range Status   10/22/2013 7.4 (H) 4.5 - 6.2 % Final     Patient Active Problem List   Diagnosis    Major depressive disorder, recurrent episode, mild    Hypertension    Type 2 diabetes, uncontrolled, with retinopathy    CAD, multiple vessel    S/P CABG x 2    Macular degeneration    Obstructive sleep apnea on CPAP    Anxiety state, unspecified    Insulin long-term use    Primary osteoarthritis of both knees    Chronic low back pain    DJD (degenerative joint disease), lumbar    Type 2 diabetes, uncontrolled, with neuropathy    Bilateral carotid artery disease    Hyperlipidemia LDL goal <100    Type 2 diabetes, uncontrolled, with peripheral circulatory disorder    COPD (chronic obstructive pulmonary disease)    Atherosclerosis of abdominal aorta    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 18 g 0    BD INSULIN PEN NEEDLE UF SHORT 31 gauge x 5/16" Ndle       BD INSULIN SYRINGE ULTRA-FINE 1/2 mL 31 gauge x 15/64" Syrg       carvedilol (COREG) 25 MG tablet Take 25 mg by mouth. 1 Tablet Oral Twice a day       cinnamon bark 500 mg capsule Take 1,000 mg by mouth once daily.        clopidogrel (PLAVIX) 75 mg tablet Take 75 mg by mouth once daily.      diazepam (VALIUM) 2 MG tablet Take 2 mg by mouth continuous prn.      duloxetine (CYMBALTA) 30 MG capsule Take 1 capsule (30 mg total) by mouth once daily. 30 capsule 10    furosemide (LASIX) 40 MG tablet Take 40 mg by mouth once daily.       gabapentin (NEURONTIN) 300 MG capsule TAKE 2 CAPSULES BY MOUTH 3 TIMES DAILY 540 capsule 1    glimepiride (AMARYL) 4 MG tablet Take 4 mg by mouth daily with breakfast.       insulin NPH-insulin regular, 70/30, (NOVOLIN 70/30) 100 unit/mL (70-30) injection Inject into the skin. Inject 5 units at breakfast and inject 10 units at night      insulin syringe-needle U-100 1/2 mL 31 x 5/16" Syrg       losartan (COZAAR) 100 MG tablet Take 1 tablet (100 mg total) by mouth once daily. 90 tablet 3    metformin (GLUCOPHAGE) 500 MG tablet Take 1,000 mg by mouth 2 (two) times daily with meals. 2 Tablets in the morning, 2 Tablets in the evening      nitroGLYCERIN (NITROSTAT) 0.3 MG SL tablet PLACE 1 TABLET UNDER TONGUE AS NEEDED FOR CHEST PAIN EVERY 5 MINUTES, UP TO 3 DOSES IN 15MINUTES  2    OM-3/DHA/EPA/FISH OIL/VIT D3 (FISH OIL-VIT D3 ORAL) Take 2,000 Units by mouth once daily.       pravastatin (PRAVACHOL) 20 MG tablet Take 20 mg by mouth " once daily.      spironolactone (ALDACTONE) 25 MG tablet 25 mg once daily.       VIT C/VIT E AC/LUT/COPPER/ZINC (PRESERVISION LUTEIN ORAL) Take by mouth.      warfarin (COUMADIN) 5 MG tablet Take 2 tabs by mouth on Tuesday,Thursday, Saturday and Sundays then 1.5 on all other days.       No current facility-administered medications on file prior to visit.      Review of patient's allergies indicates:   Allergen Reactions    Codeine Nausea Only     weak     Past Surgical History:   Procedure Laterality Date    broken elbow      left    COLONOSCOPY N/A 10/4/2017    Procedure: COLONOSCOPY;  Surgeon: Gabriel Leung MD;  [facility];  Service: Endoscopy;  Laterality: N/A;    EYE SURGERY      cataract    heart bypass      2004    HERNIA REPAIR      right    ROTATOR CUFF REPAIR      right  2004     Family History   Problem Relation Age of Onset    Arthritis Mother     Diabetes Mother     Stroke Mother     Heart attack Father     Cancer Brother     Throat cancer Brother     Diabetes Maternal Aunt     Diabetes Maternal Uncle     Heart disease Maternal Uncle     Diabetes Paternal Aunt     Heart disease Paternal Aunt     Heart disease Paternal Uncle     Heart disease Maternal Grandmother     Cancer Maternal Grandfather      Social History     Social History    Marital status:      Spouse name: N/A    Number of children: 4    Years of education: GED     Occupational History    retired tug       Social History Main Topics    Smoking status: Former Smoker     Packs/day: 3.00     Years: 45.00     Types: Cigarettes     Quit date: 4/20/2004    Smokeless tobacco: Never Used    Alcohol use Yes      Comment: was heavy drinker 35 years ago, rare use now    Drug use: No    Sexual activity: Yes     Partners: Female     Other Topics Concern    Not on file     Social History Narrative    No narrative on file     Review of Systems   Constitution: Negative for chills, fever and  "weakness.   Cardiovascular: Negative for claudication and leg swelling.   Respiratory: Positive for cough. Negative for shortness of breath.    Skin: Positive for dry skin and nail changes. Negative for itching and rash.   Musculoskeletal: Positive for arthritis, back pain and joint pain. Negative for falls, joint swelling and muscle weakness.   Gastrointestinal: Negative for diarrhea, nausea and vomiting.   Neurological: Positive for numbness and paresthesias. Negative for tremors.   Psychiatric/Behavioral: Negative for altered mental status and hallucinations.           Objective:       Vitals:    04/11/18 0927   BP: (!) 152/74   Weight: 80.7 kg (178 lb)   Height: 5' 8" (1.727 m)   PainSc: 0-No pain       Physical Exam   Constitutional:   General: Pt. is well-developed, well-nourished, appears stated age, in no acute distress, alert and oriented x 3. No evidence of depression, anxiety, or agitation. Calm, cooperative, and communicative. Appropriate interactions and affect.       Cardiovascular:   Pulses:       Dorsalis pedis pulses are 2+ on the right side, and 2+ on the left side.        Posterior tibial pulses are 1+ on the right side, and 1+ on the left side.   There is decreased digital hair.   Musculoskeletal:        Right foot: There is normal range of motion.        Left foot:  There is normal range of motion.   Muscle strength is 5/5 in all groups bilaterally.    Decreased stride, station of gait.  apropulsive toe off.  Increased angle and base of gait.    Patient has hammertoes of digits 2-5 bilateral partially reducible without symptom today.    Visible and palpable bunion without pain at dorsomedial 1st metatarsal head right and left.  Hallux abducted right and left partially reducible, tracks laterally without being track bound.  No ecchymosis, erythema, edema, or cardinal signs infection or signs of trauma same foot.     Neurological: A sensory deficit is present.   C[place]-Keon 5.07 monofilamant " testing is diminished Farhad feet. Sharp/dull sensation diminished Bilaterally   Skin: Skin is warm, dry and intact. No abrasion, no ecchymosis, no lesion and no rash noted. He is not diaphoretic. No cyanosis or erythema. No pallor. Nails show no clubbing.   Toenails 1-5 bilaterally are elongated by 2-3 mm, thickened by 2-3 mm, discolored/yellowed, dystrophic, brittle with subungual debris.    Focal hyperkeratotic lesion consisting entirely of hyperkeratotic tissue without open skin, drainage, pus, fluctuance, malodor, or signs of infection: medial IPJ of hallux farhad    Interdigital Spaces clean, dry and without evidence of break in skin integrity   Psychiatric: He has a normal mood and affect. His speech is normal.   Nursing note and vitals reviewed.        Assessment:       Encounter Diagnoses   Name Primary?    Type 2 diabetes, uncontrolled, with peripheral circulatory disorder Yes    Type II diabetes mellitus with neurological manifestations     Onychomycosis due to dermatophyte     Corn or callus          Plan:       Percy was seen today for diabetes mellitus, diabetic foot exam and nail care.    Diagnoses and all orders for this visit:    Type 2 diabetes, uncontrolled, with peripheral circulatory disorder    Type II diabetes mellitus with neurological manifestations    Onychomycosis due to dermatophyte    Corn or callus      I counseled the patient on his conditions, their implications and medical management.      - Shoe inspection. Diabetic Foot Education. Patient reminded of the importance of good nutrition and blood sugar control to help prevent podiatric complications of diabetes. Patient instructed on proper foot hygeine. We discussed wearing proper shoe gear, daily foot inspections, never walking without protective shoe gear, never putting sharp instruments to feet    - With patient's permission, nails were aggressively reduced and debrided x 10 to their soft tissue attachment mechanically and with  electric , removing all offending nail and debris. Patient relates relief following the procedure.     - After cleansing the  area w/ alcohol prep pad the above mentioned hyperkeratosis was trimmed utilizing No 15 scapel, to a smooth base with out incident. Patient tolerated this  well and reported comfort to the area of medial hallux IPJ concha    - He will continue to monitor the areas daily, inspect his feet, wear protective shoe gear when ambulatory, moisturizer to maintain skin integrity and follow in this office in approximately 60-70 days, sooner PRN

## 2018-04-12 ENCOUNTER — TELEPHONE (OUTPATIENT)
Dept: FAMILY MEDICINE | Facility: CLINIC | Age: 78
End: 2018-04-12

## 2018-04-12 DIAGNOSIS — R41.3 MEMORY DIFFICULTIES: Primary | ICD-10-CM

## 2018-04-12 NOTE — TELEPHONE ENCOUNTER
----- Message from Tasia Melissa sent at 4/12/2018 10:17 AM CDT -----  Contact: Self  Pt calling to speak to a nurse regarding loss of memory. Please call [phone].

## 2018-04-12 NOTE — TELEPHONE ENCOUNTER
Spoke with the patient, he is experiencing short term memory loss.  He has been experiencing this for over a year.  Pt don't cook.  Patient is forgetting a lot, while talking with others.

## 2018-04-12 NOTE — TELEPHONE ENCOUNTER
Recommend consultation with neurology - does he want us to set up an appointment for him?      Spoke with pt, he said ok, for a one time thing.  Patient vision is not good and he don't like to drive.  Could this appointment be scheduled on the [facility]?

## 2018-04-17 DIAGNOSIS — F33.0 MAJOR DEPRESSIVE DISORDER, RECURRENT EPISODE, MILD: ICD-10-CM

## 2018-04-17 RX ORDER — DULOXETIN HYDROCHLORIDE 30 MG/1
CAPSULE, DELAYED RELEASE ORAL
Qty: 90 CAPSULE | Refills: 0 | Status: SHIPPED | OUTPATIENT
Start: 2018-04-17 | End: 2018-07-23 | Stop reason: SDUPTHER

## 2018-04-17 NOTE — TELEPHONE ENCOUNTER
Spoke with the pt, informed him that a referral has been place into the system.  Patient is requesting a refill on Cymbalta 30 mg.  Patient verbalized understandings.

## 2018-04-19 ENCOUNTER — TELEPHONE (OUTPATIENT)
Dept: FAMILY MEDICINE | Facility: CLINIC | Age: 78
End: 2018-04-19

## 2018-04-20 ENCOUNTER — TELEPHONE (OUTPATIENT)
Dept: ADMINISTRATIVE | Facility: HOSPITAL | Age: 78
End: 2018-04-20

## 2018-04-20 DIAGNOSIS — E11.9 TYPE 2 DIABETES MELLITUS WITHOUT COMPLICATION: ICD-10-CM

## 2018-05-29 DIAGNOSIS — I10 ESSENTIAL HYPERTENSION: Primary | ICD-10-CM

## 2018-05-30 RX ORDER — CARVEDILOL 25 MG/1
25 TABLET ORAL 2 TIMES DAILY
Qty: 180 TABLET | Refills: 0 | Status: SHIPPED | OUTPATIENT
Start: 2018-05-30 | End: 2018-08-25 | Stop reason: SDUPTHER

## 2018-06-04 ENCOUNTER — OFFICE VISIT (OUTPATIENT)
Dept: NEUROLOGY | Facility: CLINIC | Age: 78
End: 2018-06-04
Payer: MEDICARE

## 2018-06-04 ENCOUNTER — LAB VISIT (OUTPATIENT)
Dept: LAB | Facility: HOSPITAL | Age: 78
End: 2018-06-04
Attending: NEUROLOGICAL SURGERY
Payer: MEDICARE

## 2018-06-04 VITALS
BODY MASS INDEX: 27.36 KG/M2 | DIASTOLIC BLOOD PRESSURE: 82 MMHG | HEIGHT: 68 IN | HEART RATE: 58 BPM | WEIGHT: 180.56 LBS | SYSTOLIC BLOOD PRESSURE: 144 MMHG

## 2018-06-04 DIAGNOSIS — G31.84 MCI (MILD COGNITIVE IMPAIRMENT): Primary | ICD-10-CM

## 2018-06-04 DIAGNOSIS — G31.84 MCI (MILD COGNITIVE IMPAIRMENT): ICD-10-CM

## 2018-06-04 DIAGNOSIS — R41.3 MEMORY DEFICITS: ICD-10-CM

## 2018-06-04 LAB
TSH SERPL DL<=0.005 MIU/L-ACNC: 1.49 UIU/ML
VIT B12 SERPL-MCNC: 394 PG/ML

## 2018-06-04 PROCEDURE — 99999 PR PBB SHADOW E&M-EST. PATIENT-LVL III: CPT | Mod: PBBFAC,,, | Performed by: NEUROLOGICAL SURGERY

## 2018-06-04 PROCEDURE — 84443 ASSAY THYROID STIM HORMONE: CPT

## 2018-06-04 PROCEDURE — 99204 OFFICE O/P NEW MOD 45 MIN: CPT | Mod: S$GLB,,, | Performed by: NEUROLOGICAL SURGERY

## 2018-06-04 PROCEDURE — 36415 COLL VENOUS BLD VENIPUNCTURE: CPT | Mod: PO

## 2018-06-04 PROCEDURE — 3079F DIAST BP 80-89 MM HG: CPT | Mod: CPTII,S$GLB,, | Performed by: NEUROLOGICAL SURGERY

## 2018-06-04 PROCEDURE — 82607 VITAMIN B-12: CPT

## 2018-06-04 PROCEDURE — 3077F SYST BP >= 140 MM HG: CPT | Mod: CPTII,S$GLB,, | Performed by: NEUROLOGICAL SURGERY

## 2018-06-04 NOTE — LETTER
June 5, 2018      Kasie Edmondson MD  [address] [place] [address]           [facility]  [address] [place] [address]  Phone: [phone]  Fax: [phone]          Patient: Percy Ramirez   MR Number: [id number]   YOB: 1940   Date of Visit: 6/4/2018       Dear Dr. Kasie Edmondson:    Thank you for referring Percy Ramirez to me for evaluation. Attached you will find relevant portions of my assessment and plan of care.    If you have questions, please do not hesitate to call me. I look forward to following Percy Ramirez along with you.    Sincerely,    Doug Victoria MD    Enclosure  CC:  No Recipients    If you would like to receive this communication electronically, please contact [email or web address] or [phone] to request more information on [employer] Link access.    For providers and/or their staff who would like to refer a patient to [facility], please contact us through our one-stop-shop provider referral line, [facility], at [phone].    If you feel you have received this communication in error or would no longer like to receive these types of communications, please e-mail [email or web address]

## 2018-06-04 NOTE — PROGRESS NOTES
Chief Complaint   Patient presents with    Memory Loss        Percy Ramirez is a 77 y.o. male with a history of multiple medical diagnoses as listed below that presents for evaluation of memory loss.  He says he has been having short-term memory loss and has been having more difficulty with functioning around the home.  He says that he has been making some errors on occasion with his medications a seems to be more forgetful when it comes to functioning around the home.  He does not feel that there has been any impact on him being able to care for himself .  He says that one of his biggest problems has been remembering names, but other smaller things have been bothersome as well such as forgetting what he felt into a room to get or forgetting a task that he wanted to do around the home.    PAST MEDICAL HISTORY:  Past Medical History:   Diagnosis Date    Allergy     Arthritis     CAD, multiple vessel     DR Melsisa    Cataract     Depression     Hyperlipidemia     Hypertension     Macular degeneration     Dr Razo for injection, Jennifer for eye    S/P CABG x 2     Type 2 diabetes mellitus with circulatory disorder     Dr. Aquino       PAST SURGICAL HISTORY:  Past Surgical History:   Procedure Laterality Date    broken elbow      left    COLONOSCOPY N/A 10/4/2017    Procedure: COLONOSCOPY;  Surgeon: Gabriel Leung MD;  Location[facility];  Service: Endoscopy;  Laterality: N/A;    EYE SURGERY      cataract    heart bypass      2004    HERNIA REPAIR      right    ROTATOR CUFF REPAIR      right  2004       SOCIAL HISTORY:  Social History     Social History    Marital status:      Spouse name: N/A    Number of children: 4    Years of education: GED     Occupational History    retired tug       Social History Main Topics    Smoking status: Former Smoker     Packs/day: 3.00     Years: 45.00     Types: Cigarettes     Quit date: 4/20/2004    Smokeless tobacco: Never Used     "Alcohol use Yes      Comment: was heavy drinker 35 years ago, rare use now    Drug use: No    Sexual activity: Yes     Partners: Female     Other Topics Concern    Not on file     Social History Narrative    No narrative on file       FAMILY HISTORY:  Family History   Problem Relation Age of Onset    Arthritis Mother     Diabetes Mother     Stroke Mother     Heart attack Father     Cancer Brother     Throat cancer Brother     Diabetes Maternal Aunt     Diabetes Maternal Uncle     Heart disease Maternal Uncle     Diabetes Paternal Aunt     Heart disease Paternal Aunt     Heart disease Paternal Uncle     Heart disease Maternal Grandmother     Cancer Maternal Grandfather        ALLERGIES AND MEDICATIONS: updated and reviewed.  Review of patient's allergies indicates:   Allergen Reactions    Codeine Nausea Only     weak    Ranexa [ranolazine]      Current Outpatient Prescriptions   Medication Sig Dispense Refill    albuterol 90 mcg/actuation inhaler Inhale 2 puffs into the lungs every 6 (six) hours as needed for Wheezing or Shortness of Breath. 18 g 0    BD INSULIN PEN NEEDLE UF SHORT 31 gauge x 5/16" Ndle       BD INSULIN SYRINGE ULTRA-FINE 1/2 mL 31 gauge x 15/64" Syrg       carvedilol (COREG) 25 MG tablet Take 1 tablet (25 mg total) by mouth 2 (two) times daily. 1 Tablet Oral Twice a day 180 tablet 0    cinnamon bark 500 mg capsule Take 1,000 mg by mouth once daily.        clopidogrel (PLAVIX) 75 mg tablet Take 75 mg by mouth once daily.      diazepam (VALIUM) 2 MG tablet Take 2 mg by mouth continuous prn.      DULoxetine (CYMBALTA) 30 MG capsule TAKE ONE CAPSULE BY MOUTH ONCE DAILY 90 capsule 0    furosemide (LASIX) 40 MG tablet Take 40 mg by mouth once daily.       gabapentin (NEURONTIN) 300 MG capsule TAKE 2 CAPSULES BY MOUTH 3 TIMES DAILY 540 capsule 1    glimepiride (AMARYL) 4 MG tablet Take 4 mg by mouth daily with breakfast.       insulin NPH-insulin regular, 70/30, (NOVOLIN " "70/30) 100 unit/mL (70-30) injection Inject into the skin. Inject 5 units at breakfast and inject 10 units at night      insulin syringe-needle U-100 1/2 mL 31 x 5/16" Syrg       losartan (COZAAR) 100 MG tablet Take 1 tablet (100 mg total) by mouth once daily. 90 tablet 3    metformin (GLUCOPHAGE) 500 MG tablet Take 1,000 mg by mouth 2 (two) times daily with meals. 2 Tablets in the morning, 2 Tablets in the evening      nitroGLYCERIN (NITROSTAT) 0.3 MG SL tablet PLACE 1 TABLET UNDER TONGUE AS NEEDED FOR CHEST PAIN EVERY 5 MINUTES, UP TO 3 DOSES IN 15MINUTES  2    OM-3/DHA/EPA/FISH OIL/VIT D3 (FISH OIL-VIT D3 ORAL) Take 2,000 Units by mouth once daily.       pravastatin (PRAVACHOL) 20 MG tablet Take 20 mg by mouth once daily.      spironolactone (ALDACTONE) 25 MG tablet 25 mg once daily.       VIT C/VIT E AC/LUT/COPPER/ZINC (PRESERVISION LUTEIN ORAL) Take by mouth.      warfarin (COUMADIN) 5 MG tablet Take 2 tabs by mouth on Tuesday,Thursday, Saturday and Sundays then 1.5 on all other days.       No current facility-administered medications for this visit.        Review of Systems    Neurologic Exam     Mental Status   Oriented to person, place, and time.   Oriented to person.   Oriented to city.   Oriented to year, month, date and day.   Registration of memory: 5/5. Recall of objects at 5 minutes: 3/5.   Attention: normal. Concentration: normal.   Speech: speech is normal   Level of consciousness: alert  Knowledge: good.   Able to name object. Unable to repeat.     Cranial Nerves     CN II   Visual fields full to confrontation.   Right visual field deficit: none  Left visual field deficit: none     CN III, IV, VI   Pupils are equal, round, and reactive to light.  Extraocular motions are normal.   Right pupil: Size: 3 mm. Shape: regular. Accommodation: intact.   Left pupil: Size: 3 mm. Shape: regular. Accommodation: intact.   CN III: no CN III palsy  CN VI: no CN VI palsy  Nystagmus: none   Diplopia: " none  Ophthalmoparesis: none  Upgaze: normal  Downgaze: normal  Conjugate gaze: present    CN V   Facial sensation intact.   Right facial sensation deficit: none  Left facial sensation deficit: none    CN VII   Facial expression full, symmetric.   Right facial weakness: none  Left facial weakness: none    CN VIII   CN VIII normal.     CN IX, X   CN IX normal.   CN X normal.   Palate: symmetric    CN XI   CN XI normal.   Right sternocleidomastoid strength: normal  Left sternocleidomastoid strength: normal  Right trapezius strength: normal  Left trapezius strength: normal    CN XII   CN XII normal.   Tongue deviation: none    Motor Exam   Muscle bulk: normal  Overall muscle tone: normal  Right arm tone: normal  Left arm tone: normal  Right leg tone: normal  Left leg tone: normal    Strength   Strength 5/5 throughout.     Sensory Exam   Right arm light touch: normal  Left arm light touch: normal  Right leg light touch: normal  Left leg light touch: normal  Right arm vibration: normal  Left arm vibration: normal  Right leg vibration: normal  Left leg vibration: normal  Right arm proprioception: normal  Left arm proprioception: normal  Right leg proprioception: normal  Left leg proprioception: normal  Right arm pinprick: normal  Left arm pinprick: normal  Right leg pinprick: normal  Left leg pinprick: normal    Gait, Coordination, and Reflexes     Gait  Gait: normal    Coordination   Romberg: negative  Finger to nose coordination: normal  Heel to shin coordination: normal  Tandem walking coordination: normal    Tremor   Resting tremor: absent    Reflexes   Right brachioradialis: 2+  Left brachioradialis: 2+  Right biceps: 2+  Left biceps: 2+  Right triceps: 2+  Left triceps: 2+  Right patellar: 2+  Left patellar: 2+  Right achilles: 2+  Left achilles: 2+  Right plantar: normal  Left plantar: normal      Physical Exam   Constitutional: He is oriented to person, place, and time. He appears well-developed and  "well-nourished.   HENT:   Head: Normocephalic and atraumatic.   Eyes: EOM are normal. Pupils are equal, round, and reactive to light.   Cardiovascular: Intact distal pulses.    Pulmonary/Chest: Effort normal. No respiratory distress.   Musculoskeletal: Normal range of motion.   Neurological: He is alert and oriented to person, place, and time. He has normal strength. He has a normal Finger-Nose-Finger Test, a normal Heel to Shin Test, a normal Romberg Test and a normal Tandem Gait Test. Gait normal.   Reflex Scores:       Tricep reflexes are 2+ on the right side and 2+ on the left side.       Bicep reflexes are 2+ on the right side and 2+ on the left side.       Brachioradialis reflexes are 2+ on the right side and 2+ on the left side.       Patellar reflexes are 2+ on the right side and 2+ on the left side.       Achilles reflexes are 2+ on the right side and 2+ on the left side.  Skin: Skin is warm and dry.   Psychiatric: He has a normal mood and affect. His speech is normal and behavior is normal.       Vitals:    06/04/18 1022   BP: (!) 144/82   BP Location: Left arm   Patient Position: Sitting   BP Method: Large (Automatic)   Pulse: (!) 58   Weight: 81.9 kg (180 lb 8.9 oz)   Height: 5' 8" (1.727 m)     MOCA 18/25 (visual-spatial portions of the examination were refused, as the patient has poor vision)    Assessment & Plan:    Problem List Items Addressed This Visit     None      Visit Diagnoses     MCI (mild cognitive impairment)    -  Primary    Relevant Orders    Vitamin B12 (Completed)    TSH (Completed)    MRI Brain Without Contrast    Memory deficits         Relevant Orders    Vitamin B12 (Completed)    MRI Brain Without Contrast        Symptoms most concerning for mild cognitive impairment.  Check MRI to assess for any structural abnormalities that could be accounting for the patient's symptoms.    Follow-up: No Follow-up on file.  More than 50% of this 45 minute encounter was spent in counseling and " coordinating care.

## 2018-06-07 ENCOUNTER — TELEPHONE (OUTPATIENT)
Dept: FAMILY MEDICINE | Facility: CLINIC | Age: 78
End: 2018-06-07

## 2018-06-07 DIAGNOSIS — G47.33 OBSTRUCTIVE SLEEP APNEA ON CPAP: Primary | ICD-10-CM

## 2018-06-07 NOTE — TELEPHONE ENCOUNTER
----- Message from Tasia Melissa sent at 6/6/2018  2:04 PM CDT -----  Contact: Lakisha Banuelos calling to speak to a nurse regarding Cpap [phone].

## 2018-06-07 NOTE — TELEPHONE ENCOUNTER
----- Message from Allyssa Groves sent at 6/7/2018 10:54 AM CDT -----  Contact: Jennifer painter/  [facility] [phone]  Pt is requesting a full mask for cpap Item # is , Pls fax to [facility] [phone]. Thanks.......Silvia

## 2018-06-07 NOTE — TELEPHONE ENCOUNTER
Spoke with Leydi with [facility].  She states the pt is having problem with his face mask.  Pt is requesting a full face mask.

## 2018-06-13 RX ORDER — LOSARTAN POTASSIUM 100 MG/1
100 TABLET ORAL DAILY
Qty: 90 TABLET | Refills: 0 | Status: SHIPPED | OUTPATIENT
Start: 2018-06-13 | End: 2018-09-15 | Stop reason: SDUPTHER

## 2018-06-14 ENCOUNTER — HOSPITAL ENCOUNTER (OUTPATIENT)
Dept: RADIOLOGY | Facility: HOSPITAL | Age: 78
Discharge: HOME | End: 2018-06-14
Attending: NEUROLOGICAL SURGERY
Payer: MEDICARE

## 2018-06-14 DIAGNOSIS — G31.84 MCI (MILD COGNITIVE IMPAIRMENT): ICD-10-CM

## 2018-06-14 DIAGNOSIS — R41.3 MEMORY DEFICITS: ICD-10-CM

## 2018-06-14 PROCEDURE — 70551 MRI BRAIN STEM W/O DYE: CPT | Mod: 26,,, | Performed by: RADIOLOGY

## 2018-06-14 PROCEDURE — 70551 MRI BRAIN STEM W/O DYE: CPT | Mod: TC

## 2018-06-18 ENCOUNTER — OFFICE VISIT (OUTPATIENT)
Dept: NEUROLOGY | Facility: CLINIC | Age: 78
End: 2018-06-18
Payer: MEDICARE

## 2018-06-18 VITALS
HEART RATE: 56 BPM | WEIGHT: 182.13 LBS | HEIGHT: 68 IN | DIASTOLIC BLOOD PRESSURE: 80 MMHG | BODY MASS INDEX: 27.6 KG/M2 | SYSTOLIC BLOOD PRESSURE: 137 MMHG

## 2018-06-18 DIAGNOSIS — G31.84 MCI (MILD COGNITIVE IMPAIRMENT): Primary | ICD-10-CM

## 2018-06-18 PROCEDURE — 99214 OFFICE O/P EST MOD 30 MIN: CPT | Mod: S$GLB,,, | Performed by: NEUROLOGICAL SURGERY

## 2018-06-18 PROCEDURE — 3079F DIAST BP 80-89 MM HG: CPT | Mod: CPTII,S$GLB,, | Performed by: NEUROLOGICAL SURGERY

## 2018-06-18 PROCEDURE — 3075F SYST BP GE 130 - 139MM HG: CPT | Mod: CPTII,S$GLB,, | Performed by: NEUROLOGICAL SURGERY

## 2018-06-18 PROCEDURE — 99999 PR PBB SHADOW E&M-EST. PATIENT-LVL III: CPT | Mod: PBBFAC,,, | Performed by: NEUROLOGICAL SURGERY

## 2018-06-18 RX ORDER — DONEPEZIL HYDROCHLORIDE 10 MG/1
10 TABLET, FILM COATED ORAL NIGHTLY
Qty: 30 TABLET | Refills: 11 | Status: SHIPPED | OUTPATIENT
Start: 2018-06-18 | End: 2018-07-02 | Stop reason: SINTOL

## 2018-06-19 NOTE — PROGRESS NOTES
Chief Complaint   Patient presents with    Follow-up     MRI results         Percy Ramirez is a 77 y.o. male with a history of multiple medical diagnoses as listed below that presents for evaluation of memory loss.  He says he has been having short-term memory loss and has been having more difficulty with functioning around the home.  He says that he has been making some errors on occasion with his medications a seems to be more forgetful when it comes to functioning around the home.  He does not feel that there has been any impact on him being able to care for himself .  He says that one of his biggest problems has been remembering names, but other smaller things have been bothersome as well such as forgetting what he felt into a room to get or forgetting a task that he wanted to do around the home.    Interval History  06/18/2018  He has been doing about the same overall since he was last seen in clinic.  He has been bothered by a scratch on the left hand that he cannot get to stop bleeding.  He said that he noticed a scratch on Saturday and this by bending area he has continued to have some oozing of blood there.  He has not had any other problems in the time since he was last seen.    PAST MEDICAL HISTORY:  Past Medical History:   Diagnosis Date    Allergy     Arthritis     CAD, multiple vessel     DR Melissa    Cataract     Depression     Hyperlipidemia     Hypertension     Macular degeneration     Dr Razo for injection, Jennifer for eye    S/P CABG x 2     Type 2 diabetes mellitus with circulatory disorder     Dr. Aquino       PAST SURGICAL HISTORY:  Past Surgical History:   Procedure Laterality Date    broken elbow      left    COLONOSCOPY N/A 10/4/2017    Procedure: COLONOSCOPY;  Surgeon: Gabriel Leung MD;  Location: [facility];  Service: Endoscopy;  Laterality: N/A;    EYE SURGERY      cataract    heart bypass      2004    HERNIA REPAIR      right    ROTATOR CUFF REPAIR      right   "2004       SOCIAL HISTORY:  Social History     Social History    Marital status:      Spouse name: N/A    Number of children: 4    Years of education: GED     Occupational History    retired tug       Social History Main Topics    Smoking status: Former Smoker     Packs/day: 3.00     Years: 45.00     Types: Cigarettes     Quit date: 4/20/2004    Smokeless tobacco: Never Used    Alcohol use Yes      Comment: was heavy drinker 35 years ago, rare use now    Drug use: No    Sexual activity: Yes     Partners: Female     Other Topics Concern    Not on file     Social History Narrative    No narrative on file       FAMILY HISTORY:  Family History   Problem Relation Age of Onset    Arthritis Mother     Diabetes Mother     Stroke Mother     Heart attack Father     Cancer Brother     Throat cancer Brother     Diabetes Maternal Aunt     Diabetes Maternal Uncle     Heart disease Maternal Uncle     Diabetes Paternal Aunt     Heart disease Paternal Aunt     Heart disease Paternal Uncle     Heart disease Maternal Grandmother     Cancer Maternal Grandfather        ALLERGIES AND MEDICATIONS: updated and reviewed.  Review of patient's allergies indicates:   Allergen Reactions    Codeine Nausea Only     weak    Ranexa [ranolazine]      Current Outpatient Prescriptions   Medication Sig Dispense Refill    albuterol 90 mcg/actuation inhaler Inhale 2 puffs into the lungs every 6 (six) hours as needed for Wheezing or Shortness of Breath. 18 g 0    BD INSULIN PEN NEEDLE UF SHORT 31 gauge x 5/16" Ndle       BD INSULIN SYRINGE ULTRA-FINE 1/2 mL 31 gauge x 15/64" Syrg       carvedilol (COREG) 25 MG tablet Take 1 tablet (25 mg total) by mouth 2 (two) times daily. 1 Tablet Oral Twice a day 180 tablet 0    cinnamon bark 500 mg capsule Take 1,000 mg by mouth once daily.        clopidogrel (PLAVIX) 75 mg tablet Take 75 mg by mouth once daily.      diazepam (VALIUM) 2 MG tablet Take 2 mg by mouth " "continuous prn.      donepezil (ARICEPT) 10 MG tablet Take 1 tablet (10 mg total) by mouth every evening. Take one half tablet for one week then as prescribed. 30 tablet 11    DULoxetine (CYMBALTA) 30 MG capsule TAKE ONE CAPSULE BY MOUTH ONCE DAILY 90 capsule 0    furosemide (LASIX) 40 MG tablet Take 40 mg by mouth once daily.       gabapentin (NEURONTIN) 300 MG capsule TAKE 2 CAPSULES BY MOUTH 3 TIMES DAILY 540 capsule 1    glimepiride (AMARYL) 4 MG tablet Take 4 mg by mouth daily with breakfast.       insulin NPH-insulin regular, 70/30, (NOVOLIN 70/30) 100 unit/mL (70-30) injection Inject into the skin. Inject 5 units at breakfast and inject 10 units at night      insulin syringe-needle U-100 1/2 mL 31 x 5/16" Syrg       losartan (COZAAR) 100 MG tablet Take 1 tablet (100 mg total) by mouth once daily. 90 tablet 0    metformin (GLUCOPHAGE) 500 MG tablet Take 1,000 mg by mouth 2 (two) times daily with meals. 2 Tablets in the morning, 2 Tablets in the evening      nitroGLYCERIN (NITROSTAT) 0.3 MG SL tablet PLACE 1 TABLET UNDER TONGUE AS NEEDED FOR CHEST PAIN EVERY 5 MINUTES, UP TO 3 DOSES IN 15MINUTES  2    OM-3/DHA/EPA/FISH OIL/VIT D3 (FISH OIL-VIT D3 ORAL) Take 2,000 Units by mouth once daily.       pravastatin (PRAVACHOL) 20 MG tablet Take 20 mg by mouth once daily.      spironolactone (ALDACTONE) 25 MG tablet 25 mg once daily.       VIT C/VIT E AC/LUT/COPPER/ZINC (PRESERVISION LUTEIN ORAL) Take by mouth.      warfarin (COUMADIN) 5 MG tablet Take 2 tabs by mouth on Tuesday,Thursday, Saturday and Sundays then 1.5 on all other days.       No current facility-administered medications for this visit.        Review of Systems   Constitutional: Negative for activity change, fatigue and unexpected weight change.   HENT: Negative for trouble swallowing and voice change.    Eyes: Negative for photophobia, pain and visual disturbance.   Respiratory: Negative for apnea and shortness of breath.  "   Cardiovascular: Negative for chest pain and palpitations.   Gastrointestinal: Negative for constipation, nausea and vomiting.   Genitourinary: Negative for difficulty urinating.   Musculoskeletal: Negative for arthralgias, back pain, gait problem, myalgias and neck pain.   Skin: Negative for color change and rash.   Neurological: Negative for dizziness, seizures, syncope, speech difficulty, weakness, light-headedness, numbness and headaches.   Psychiatric/Behavioral: Positive for confusion and decreased concentration. Negative for agitation and behavioral problems.       Neurologic Exam     Mental Status   Oriented to person, place, and time.   Oriented to person.   Oriented to city.   Oriented to year, month, date and day.   Registration of memory: 5/5. Recall of objects at 5 minutes: 3/5.   Attention: normal. Concentration: normal.   Speech: speech is normal   Level of consciousness: alert  Knowledge: good.   Able to name object. Unable to repeat.     Cranial Nerves     CN II   Visual fields full to confrontation.   Right visual field deficit: none  Left visual field deficit: none     CN III, IV, VI   Pupils are equal, round, and reactive to light.  Extraocular motions are normal.   Right pupil: Size: 3 mm. Shape: regular. Accommodation: intact.   Left pupil: Size: 3 mm. Shape: regular. Accommodation: intact.   CN III: no CN III palsy  CN VI: no CN VI palsy  Nystagmus: none   Diplopia: none  Ophthalmoparesis: none  Upgaze: normal  Downgaze: normal  Conjugate gaze: present    CN V   Facial sensation intact.   Right facial sensation deficit: none  Left facial sensation deficit: none    CN VII   Facial expression full, symmetric.   Right facial weakness: none  Left facial weakness: none    CN VIII   CN VIII normal.     CN IX, X   CN IX normal.   CN X normal.   Palate: symmetric    CN XI   CN XI normal.   Right sternocleidomastoid strength: normal  Left sternocleidomastoid strength: normal  Right trapezius strength:  normal  Left trapezius strength: normal    CN XII   CN XII normal.   Tongue deviation: none    Motor Exam   Muscle bulk: normal  Overall muscle tone: normal  Right arm tone: normal  Left arm tone: normal  Right leg tone: normal  Left leg tone: normal    Strength   Strength 5/5 throughout.     Sensory Exam   Right arm light touch: normal  Left arm light touch: normal  Right leg light touch: normal  Left leg light touch: normal  Right arm vibration: normal  Left arm vibration: normal  Right leg vibration: normal  Left leg vibration: normal  Right arm proprioception: normal  Left arm proprioception: normal  Right leg proprioception: normal  Left leg proprioception: normal  Right arm pinprick: normal  Left arm pinprick: normal  Right leg pinprick: normal  Left leg pinprick: normal    Gait, Coordination, and Reflexes     Gait  Gait: normal    Coordination   Romberg: negative  Finger to nose coordination: normal  Heel to shin coordination: normal  Tandem walking coordination: normal    Tremor   Resting tremor: absent    Reflexes   Right brachioradialis: 2+  Left brachioradialis: 2+  Right biceps: 2+  Left biceps: 2+  Right triceps: 2+  Left triceps: 2+  Right patellar: 2+  Left patellar: 2+  Right achilles: 2+  Left achilles: 2+  Right plantar: normal  Left plantar: normal      Physical Exam   Constitutional: He is oriented to person, place, and time. He appears well-developed and well-nourished.   HENT:   Head: Normocephalic and atraumatic.   Eyes: EOM are normal. Pupils are equal, round, and reactive to light.   Cardiovascular: Intact distal pulses.    Pulmonary/Chest: Effort normal. No respiratory distress.   Musculoskeletal: Normal range of motion.   Neurological: He is alert and oriented to person, place, and time. He has normal strength. He has a normal Finger-Nose-Finger Test, a normal Heel to Shin Test, a normal Romberg Test and a normal Tandem Gait Test. Gait normal.   Reflex Scores:       Tricep reflexes are 2+  "on the right side and 2+ on the left side.       Bicep reflexes are 2+ on the right side and 2+ on the left side.       Brachioradialis reflexes are 2+ on the right side and 2+ on the left side.       Patellar reflexes are 2+ on the right side and 2+ on the left side.       Achilles reflexes are 2+ on the right side and 2+ on the left side.  Skin: Skin is warm and dry.   Psychiatric: He has a normal mood and affect. His speech is normal and behavior is normal.       Vitals:    06/18/18 1012   BP: 137/80   BP Location: Left arm   Patient Position: Sitting   BP Method: Large (Automatic)   Pulse: (!) 56   Weight: 82.6 kg (182 lb 1.6 oz)   Height: 5' 8" (1.727 m)     MOCA 18/25 (visual-spatial portions of the examination were refused, as the patient has poor vision)  MRI brain personally reviewed:  Chronic microvascular ischemic changes noted.  Assessment & Plan:    Problem List Items Addressed This Visit     MCI (mild cognitive impairment) - Primary    Relevant Medications    donepezil (ARICEPT) 10 MG tablet        Follow-up: Follow-up in about 1 month (around 7/18/2018).  More than 50% of this 25 minute encounter was spent in counseling and coordinating care.      "

## 2018-06-20 ENCOUNTER — OFFICE VISIT (OUTPATIENT)
Dept: PODIATRY | Facility: CLINIC | Age: 78
End: 2018-06-20
Payer: MEDICARE

## 2018-06-20 VITALS
WEIGHT: 182 LBS | HEIGHT: 68 IN | BODY MASS INDEX: 27.58 KG/M2 | DIASTOLIC BLOOD PRESSURE: 64 MMHG | SYSTOLIC BLOOD PRESSURE: 122 MMHG

## 2018-06-20 DIAGNOSIS — B35.1 ONYCHOMYCOSIS DUE TO DERMATOPHYTE: ICD-10-CM

## 2018-06-20 DIAGNOSIS — M20.41 HAMMER TOES OF BOTH FEET: ICD-10-CM

## 2018-06-20 DIAGNOSIS — M20.12 HALLUX ABDUCTO VALGUS, LEFT: ICD-10-CM

## 2018-06-20 DIAGNOSIS — L84 CORN OR CALLUS: ICD-10-CM

## 2018-06-20 DIAGNOSIS — M20.11 HALLUX ABDUCTO VALGUS, RIGHT: ICD-10-CM

## 2018-06-20 DIAGNOSIS — M20.42 HAMMER TOES OF BOTH FEET: ICD-10-CM

## 2018-06-20 DIAGNOSIS — E11.49 TYPE II DIABETES MELLITUS WITH NEUROLOGICAL MANIFESTATIONS: ICD-10-CM

## 2018-06-20 DIAGNOSIS — M20.10 HALLUX ABDUCTO VALGUS, UNSPECIFIED LATERALITY: ICD-10-CM

## 2018-06-20 PROCEDURE — 99499 UNLISTED E&M SERVICE: CPT | Mod: S$GLB,,, | Performed by: PODIATRIST

## 2018-06-20 PROCEDURE — 11056 PARNG/CUTG B9 HYPRKR LES 2-4: CPT | Mod: Q9,S$GLB,, | Performed by: PODIATRIST

## 2018-06-20 PROCEDURE — 11721 DEBRIDE NAIL 6 OR MORE: CPT | Mod: 59,Q9,S$GLB, | Performed by: PODIATRIST

## 2018-06-20 PROCEDURE — 99999 PR PBB SHADOW E&M-EST. PATIENT-LVL III: CPT | Mod: PBBFAC,,, | Performed by: PODIATRIST

## 2018-06-21 NOTE — PROGRESS NOTES
Subjective:      Patient ID: Percy Ramirez is a 77 y.o. male.    Chief Complaint: Diabetes Mellitus (1/8/18 Dr. Edmondson); Diabetic Foot Exam; and Nail Care    Percy is a 77 y.o. male who presents to the clinic for evaluation and treatment of high risk feet. Percy has a past medical history of Allergy; Arthritis; CAD, multiple vessel; Cataract; Depression; Hyperlipidemia; Hypertension; Macular degeneration; S/P CABG x 2; and Type 2 diabetes mellitus with circulatory disorder. The patient's chief complaint is elongated, thickened toenails aggravated by increased weight bearing, shoe gear, pressure.    Patient admits to barefoot walking often in and around home    This patient has documented high risk feet requiring routine maintenance secondary to diabetes mellitis and those secondary complications of diabetes, as mentioned..    PCP: Kasie Edmondson MD    Date Last Seen by PCP:   Chief Complaint   Patient presents with    Diabetes Mellitus     1/8/18 Dr. Edmondson    Diabetic Foot Exam    Nail Care     Current shoe gear:  slippers    Hemoglobin A1C   Date Value Ref Range Status   01/23/2018 6.9 (H) 4.0 - 5.6 % Final     Comment:     Dr. Philippe Aquino(Endocrinology)   10/22/2013 7.4 (H) 4.5 - 6.2 % Final     Patient Active Problem List   Diagnosis    Major depressive disorder, recurrent episode, mild    Hypertension    Type 2 diabetes, uncontrolled, with retinopathy    CAD, multiple vessel    S/P CABG x 2    Macular degeneration    Obstructive sleep apnea on CPAP    Anxiety state, unspecified    Insulin long-term use    Primary osteoarthritis of both knees    Chronic low back pain    DJD (degenerative joint disease), lumbar    Type 2 diabetes, uncontrolled, with neuropathy    Bilateral carotid artery disease    Hyperlipidemia LDL goal <100    Type 2 diabetes, uncontrolled, with peripheral circulatory disorder    COPD (chronic obstructive pulmonary disease)    Atherosclerosis of abdominal  "aorta    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 18 g 0    BD INSULIN PEN NEEDLE UF SHORT 31 gauge x 5/16" Ndle       BD INSULIN SYRINGE ULTRA-FINE 1/2 mL 31 gauge x 15/64" Syrg       carvedilol (COREG) 25 MG tablet Take 1 tablet (25 mg total) by mouth 2 (two) times daily. 1 Tablet Oral Twice a day 180 tablet 0    cinnamon bark 500 mg capsule Take 1,000 mg by mouth once daily.        clopidogrel (PLAVIX) 75 mg tablet Take 75 mg by mouth once daily.      diazepam (VALIUM) 2 MG tablet Take 2 mg by mouth continuous prn.      donepezil (ARICEPT) 10 MG tablet Take 1 tablet (10 mg total) by mouth every evening. Take one half tablet for one week then as prescribed. 30 tablet 11    DULoxetine (CYMBALTA) 30 MG capsule TAKE ONE CAPSULE BY MOUTH ONCE DAILY 90 capsule 0    furosemide (LASIX) 40 MG tablet Take 40 mg by mouth once daily.       gabapentin (NEURONTIN) 300 MG capsule TAKE 2 CAPSULES BY MOUTH 3 TIMES DAILY 540 capsule 1    glimepiride (AMARYL) 4 MG tablet Take 4 mg by mouth daily with breakfast.       insulin NPH-insulin regular, 70/30, (NOVOLIN 70/30) 100 unit/mL (70-30) injection Inject into the skin. Inject 5 units at breakfast and inject 10 units at night      insulin syringe-needle U-100 1/2 mL 31 x 5/16" Syrg       losartan (COZAAR) 100 MG tablet Take 1 tablet (100 mg total) by mouth once daily. 90 tablet 0    metformin (GLUCOPHAGE) 500 MG tablet Take 1,000 mg by mouth 2 (two) times daily with meals. 2 Tablets in the morning, 2 " Tablets in the evening      nitroGLYCERIN (NITROSTAT) 0.3 MG SL tablet PLACE 1 TABLET UNDER TONGUE AS NEEDED FOR CHEST PAIN EVERY 5 MINUTES, UP TO 3 DOSES IN 15MINUTES  2    OM-3/DHA/EPA/FISH OIL/VIT D3 (FISH OIL-VIT D3 ORAL) Take 2,000 Units by mouth once daily.       pravastatin (PRAVACHOL) 20 MG tablet Take 20 mg by mouth once daily.      spironolactone (ALDACTONE) 25 MG tablet 25 mg once daily.       VIT C/VIT E AC/LUT/COPPER/ZINC (PRESERVISION LUTEIN ORAL) Take by mouth.      warfarin (COUMADIN) 5 MG tablet Take 2 tabs by mouth on Tuesday,Thursday, Saturday and Sundays then 1.5 on all other days.       No current facility-administered medications on file prior to visit.      Review of patient's allergies indicates:   Allergen Reactions    Codeine Nausea Only     weak     Past Surgical History:   Procedure Laterality Date    broken elbow      left    COLONOSCOPY N/A 10/4/2017    Procedure: COLONOSCOPY;  Surgeon: Gabriel Leung MD;  Location: [facility];  Service: Endoscopy;  Laterality: N/A;    EYE SURGERY      cataract    heart bypass      2004    HERNIA REPAIR      right    ROTATOR CUFF REPAIR      right  2004     Family History   Problem Relation Age of Onset    Arthritis Mother     Diabetes Mother     Stroke Mother     Heart attack Father     Cancer Brother     Throat cancer Brother     Diabetes Maternal Aunt     Diabetes Maternal Uncle     Heart disease Maternal Uncle     Diabetes Paternal Aunt     Heart disease Paternal Aunt     Heart disease Paternal Uncle     Heart disease Maternal Grandmother     Cancer Maternal Grandfather      Social History     Social History    Marital status:      Spouse name: N/A    Number of children: 4    Years of education: GED     Occupational History    retired tug       Social History Main Topics    Smoking status: Former Smoker     Packs/day: 3.00     Years: 45.00     Types: Cigarettes     Quit date: 4/20/2004     "Smokeless tobacco: Never Used    Alcohol use Yes      Comment: was heavy drinker 35 years ago, rare use now    Drug use: No    Sexual activity: Yes     Partners: Female     Other Topics Concern    Not on file     Social History Narrative    No narrative on file     Review of Systems   Constitution: Negative for chills, fever and weakness.   Cardiovascular: Negative for claudication and leg swelling.   Respiratory: Positive for cough. Negative for shortness of breath.    Skin: Positive for dry skin and nail changes. Negative for itching and rash.   Musculoskeletal: Positive for arthritis, back pain and joint pain. Negative for falls, joint swelling and muscle weakness.   Gastrointestinal: Negative for diarrhea, nausea and vomiting.   Neurological: Positive for numbness and paresthesias. Negative for tremors.   Psychiatric/Behavioral: Negative for altered mental status and hallucinations.           Objective:       Vitals:    06/20/18 1049   BP: 122/64   Weight: 82.6 kg (182 lb)   Height: 5' 8" (1.727 m)   PainSc: 0-No pain       Physical Exam   Constitutional:   General: Pt. is well-developed, well-nourished, appears stated age, in no acute distress, alert and oriented x 3. No evidence of depression, anxiety, or agitation. Calm, cooperative, and communicative. Appropriate interactions and affect.       Cardiovascular:   Pulses:       Dorsalis pedis pulses are 2+ on the right side, and 2+ on the left side.        Posterior tibial pulses are 1+ on the right side, and 1+ on the left side.   There is decreased digital hair.   Musculoskeletal:        Right foot: There is normal range of motion.        Left foot:  There is normal range of motion.   Muscle strength is 5/5 in all groups bilaterally.    Decreased stride, station of gait.  apropulsive toe off.  Increased angle and base of gait.    Patient has hammertoes of digits 2-5 bilateral partially reducible without symptom today.    Visible and palpable bunion " without pain at dorsomedial 1st metatarsal head right and left.  Hallux abducted right and left partially reducible, tracks laterally without being track bound.  No ecchymosis, erythema, edema, or cardinal signs infection or signs of trauma same foot.     Neurological: A sensory deficit is present.   Sh[place]-Keon 5.07 monofilamant testing is diminished Farhad feet. Sharp/dull sensation diminished Bilaterally   Skin: Skin is warm, dry and intact. No abrasion, no ecchymosis, no lesion and no rash noted. He is not diaphoretic. No cyanosis or erythema. No pallor. Nails show no clubbing.   Toenails 1-5 bilaterally are elongated by 2-3 mm, thickened by 2-3 mm, discolored/yellowed, dystrophic, brittle with subungual debris.    Focal hyperkeratotic lesion consisting entirely of hyperkeratotic tissue without open skin, drainage, pus, fluctuance, malodor, or signs of infection: medial IPJ of hallux farhad    Interdigital Spaces clean, dry and without evidence of break in skin integrity   Psychiatric: He has a normal mood and affect. His speech is normal.   Nursing note and vitals reviewed.        Assessment:       Encounter Diagnoses   Name Primary?    Type 2 diabetes, uncontrolled, with peripheral circulatory disorder Yes    Type II diabetes mellitus with neurological manifestations     Onychomycosis due to dermatophyte     Corn or callus     Hammer toes of both feet     Hallux abducto valgus, left     Hallux abducto valgus, right     Hallux abducto valgus, unspecified laterality          Plan:       Percy was seen today for diabetes mellitus, diabetic foot exam and nail care.    Diagnoses and all orders for this visit:    Type 2 diabetes, uncontrolled, with peripheral circulatory disorder    Type II diabetes mellitus with neurological manifestations    Onychomycosis due to dermatophyte    Corn or callus    Hammer toes of both feet    Hallux abducto valgus, left    Hallux abducto valgus, right    Hallux abducto  valgus, unspecified laterality      I counseled the patient on his conditions, their implications and medical management.      - Shoe inspection. Diabetic Foot Education. Patient reminded of the importance of good nutrition and blood sugar control to help prevent podiatric complications of diabetes. Patient instructed on proper foot hygeine. We discussed wearing proper shoe gear, daily foot inspections, never walking without protective shoe gear, never putting sharp instruments to feet    - With patient's permission, nails were aggressively reduced and debrided x 10 to their soft tissue attachment mechanically and with electric , removing all offending nail and debris. Patient relates relief following the procedure.     - After cleansing the  area w/ alcohol prep pad the above mentioned hyperkeratosis was trimmed utilizing No 15 scapel, to a smooth base with out incident. Patient tolerated this  well and reported comfort to the area of medial hallux IPJ concha    - He will continue to monitor the areas daily, inspect his feet, wear protective shoe gear when ambulatory, moisturizer to maintain skin integrity and follow in this office in approximately 60-70 days, sooner PRN

## 2018-06-27 ENCOUNTER — TELEPHONE (OUTPATIENT)
Dept: NEUROLOGY | Facility: CLINIC | Age: 78
End: 2018-06-27

## 2018-06-27 NOTE — TELEPHONE ENCOUNTER
Please call patient....      Patient stated he started taking the donepezil and last night he stated he had nausea and was dizzy. He woke up this morning not feeling right and has a headache. When he was taking a 1/2 of tablet he did not feel like, so he will take a 1/2 of tablet until he speaks to you.

## 2018-06-29 ENCOUNTER — TELEPHONE (OUTPATIENT)
Dept: NEUROLOGY | Facility: CLINIC | Age: 78
End: 2018-06-29

## 2018-06-29 NOTE — TELEPHONE ENCOUNTER
He took a whole pill of the donepezil last night but I reminded him that we spoke on 6-27-18 and told him that Dr. Victoria is out of town but he will return his call as soon as he returns on Monday 7/2/18 and he stated he will try to remember to take a 1/2 of pill until he talks to Dr. Victoria.

## 2018-07-02 DIAGNOSIS — G31.84 MCI (MILD COGNITIVE IMPAIRMENT): Primary | ICD-10-CM

## 2018-07-02 RX ORDER — RIVASTIGMINE TARTRATE 1.5 MG/1
1.5 CAPSULE ORAL 2 TIMES DAILY
Qty: 60 CAPSULE | Refills: 11 | Status: SHIPPED | OUTPATIENT
Start: 2018-07-02 | End: 2019-02-18 | Stop reason: SDUPTHER

## 2018-07-16 ENCOUNTER — TELEPHONE (OUTPATIENT)
Dept: FAMILY MEDICINE | Facility: CLINIC | Age: 78
End: 2018-07-16

## 2018-07-16 NOTE — TELEPHONE ENCOUNTER
----- Message from Brandon Julien sent at 7/16/2018 10:40 AM CDT -----  Contact: Percy [phone]  Patient has a stopped up nose& head. He has been having trouble breathing through his nose. He would like something to be called in to the pharmacy or some medical advice. Please call at your earliest convenience.

## 2018-07-16 NOTE — TELEPHONE ENCOUNTER
Recommend saline nasal rinse and he can take otc coricidin hbp. Recommend office visit if symptoms worsen or persist.

## 2018-07-16 NOTE — TELEPHONE ENCOUNTER
"Patient purchased Coricidin HBP and Flonase earlier today.  Has not taken either yet.  Denies fever.  Denies body aches.  "Feels stuffy."   Reports frequent sneezing. Does not want to schedule a sooner appointment than next month.  "Will see how the medicine works first."   No further complaints or issues.   "

## 2018-07-23 DIAGNOSIS — F33.0 MAJOR DEPRESSIVE DISORDER, RECURRENT EPISODE, MILD: ICD-10-CM

## 2018-07-23 RX ORDER — DULOXETIN HYDROCHLORIDE 30 MG/1
CAPSULE, DELAYED RELEASE ORAL
Qty: 90 CAPSULE | Refills: 0 | Status: SHIPPED | OUTPATIENT
Start: 2018-07-23 | End: 2018-10-22 | Stop reason: SDUPTHER

## 2018-08-01 ENCOUNTER — TELEPHONE (OUTPATIENT)
Dept: FAMILY MEDICINE | Facility: CLINIC | Age: 78
End: 2018-08-01

## 2018-08-01 NOTE — TELEPHONE ENCOUNTER
----- Message from Shannan Hernandez sent at 8/1/2018 11:11 AM CDT -----  Contact: self  Pt calling to see if he has any labs due  Please call at [phone]

## 2018-08-08 ENCOUNTER — LAB VISIT (OUTPATIENT)
Dept: LAB | Facility: HOSPITAL | Age: 78
End: 2018-08-08
Attending: FAMILY MEDICINE
Payer: MEDICARE

## 2018-08-08 LAB
ESTIMATED AVG GLUCOSE: 160 MG/DL
HBA1C MFR BLD HPLC: 7.2 %

## 2018-08-08 PROCEDURE — 36415 COLL VENOUS BLD VENIPUNCTURE: CPT | Mod: PO

## 2018-08-08 PROCEDURE — 83036 HEMOGLOBIN GLYCOSYLATED A1C: CPT

## 2018-08-10 ENCOUNTER — OFFICE VISIT (OUTPATIENT)
Dept: FAMILY MEDICINE | Facility: CLINIC | Age: 78
End: 2018-08-10
Payer: MEDICARE

## 2018-08-10 VITALS
HEART RATE: 73 BPM | OXYGEN SATURATION: 95 % | SYSTOLIC BLOOD PRESSURE: 128 MMHG | WEIGHT: 186.63 LBS | DIASTOLIC BLOOD PRESSURE: 64 MMHG | TEMPERATURE: 98 F | BODY MASS INDEX: 28.28 KG/M2 | HEIGHT: 68 IN

## 2018-08-10 DIAGNOSIS — J44.9 CHRONIC OBSTRUCTIVE PULMONARY DISEASE, UNSPECIFIED COPD TYPE: ICD-10-CM

## 2018-08-10 DIAGNOSIS — Z00.00 ROUTINE MEDICAL EXAM: Primary | ICD-10-CM

## 2018-08-10 DIAGNOSIS — Z79.4 UNCONTROLLED TYPE 2 DIABETES MELLITUS WITH RETINOPATHY, WITH LONG-TERM CURRENT USE OF INSULIN, MACULAR EDEMA PRESENCE UNSPECIFIED, UNSPECIFIED LATERALITY, UNSPECIFIED RETINOPATHY SEVERITY: ICD-10-CM

## 2018-08-10 DIAGNOSIS — Z23 FLU VACCINE NEED: ICD-10-CM

## 2018-08-10 DIAGNOSIS — I48.0 PAROXYSMAL ATRIAL FIBRILLATION: ICD-10-CM

## 2018-08-10 DIAGNOSIS — I20.89 CHRONIC STABLE ANGINA: ICD-10-CM

## 2018-08-10 DIAGNOSIS — Z79.4 INSULIN LONG-TERM USE: ICD-10-CM

## 2018-08-10 DIAGNOSIS — E11.319 UNCONTROLLED TYPE 2 DIABETES MELLITUS WITH RETINOPATHY, WITH LONG-TERM CURRENT USE OF INSULIN, MACULAR EDEMA PRESENCE UNSPECIFIED, UNSPECIFIED LATERALITY, UNSPECIFIED RETINOPATHY SEVERITY: ICD-10-CM

## 2018-08-10 DIAGNOSIS — I10 ESSENTIAL HYPERTENSION: Chronic | ICD-10-CM

## 2018-08-10 DIAGNOSIS — E66.3 OVERWEIGHT (BMI 25.0-29.9): ICD-10-CM

## 2018-08-10 DIAGNOSIS — E78.5 HYPERLIPIDEMIA LDL GOAL <100: ICD-10-CM

## 2018-08-10 DIAGNOSIS — E11.65 UNCONTROLLED TYPE 2 DIABETES MELLITUS WITH RETINOPATHY, WITH LONG-TERM CURRENT USE OF INSULIN, MACULAR EDEMA PRESENCE UNSPECIFIED, UNSPECIFIED LATERALITY, UNSPECIFIED RETINOPATHY SEVERITY: ICD-10-CM

## 2018-08-10 DIAGNOSIS — Z71.89 ADVANCED DIRECTIVES, COUNSELING/DISCUSSION: ICD-10-CM

## 2018-08-10 PROCEDURE — 99214 OFFICE O/P EST MOD 30 MIN: CPT | Mod: S$GLB,,, | Performed by: INTERNAL MEDICINE

## 2018-08-10 PROCEDURE — 99999 PR PBB SHADOW E&M-EST. PATIENT-LVL III: CPT | Mod: PBBFAC,,, | Performed by: INTERNAL MEDICINE

## 2018-08-10 PROCEDURE — 3078F DIAST BP <80 MM HG: CPT | Mod: CPTII,S$GLB,, | Performed by: INTERNAL MEDICINE

## 2018-08-10 PROCEDURE — 3045F PR MOST RECENT HEMOGLOBIN A1C LEVEL 7.0-9.0%: CPT | Mod: CPTII,S$GLB,, | Performed by: INTERNAL MEDICINE

## 2018-08-10 PROCEDURE — 3074F SYST BP LT 130 MM HG: CPT | Mod: CPTII,S$GLB,, | Performed by: INTERNAL MEDICINE

## 2018-08-10 NOTE — PROGRESS NOTES
HISTORY OF PRESENT ILLNESS:  Percy Ramirez is a 77 y.o. male who presents to the clinic today for a routine medical physical exam. His last physical exam was approximately 1 years(s) ago.      PAST MEDICAL HISTORY:  Past Medical History:   Diagnosis Date    Allergy     Arthritis     CAD, multiple vessel     DR Melissa    Cataract     Depression     Hyperlipidemia     Hypertension     Macular degeneration     Dr Razo for injection, Jennifer for eye    S/P CABG x 2     Type 2 diabetes mellitus with circulatory disorder     Dr. Aquino       PAST SURGICAL HISTORY:  Past Surgical History:   Procedure Laterality Date    broken elbow      left    COLONOSCOPY N/A 10/4/2017    Procedure: COLONOSCOPY;  Surgeon: Gabriel Leung MD;  Location: [facility];  Service: Endoscopy;  Laterality: N/A;    EYE SURGERY      cataract    heart bypass      2004    HERNIA REPAIR      right    ROTATOR CUFF REPAIR      right  2004       SOCIAL HISTORY:  Social History     Social History    Marital status:      Spouse name: N/A    Number of children: 4    Years of education: GED     Occupational History    retired tug       Social History Main Topics    Smoking status: Former Smoker     Packs/day: 3.00     Years: 45.00     Types: Cigarettes     Quit date: 4/20/2004    Smokeless tobacco: Never Used    Alcohol use Yes      Comment: was heavy drinker 35 years ago, rare use now    Drug use: No    Sexual activity: Yes     Partners: Female     Other Topics Concern    Not on file     Social History Narrative    No narrative on file       FAMILY HISTORY:  Family History   Problem Relation Age of Onset    Arthritis Mother     Diabetes Mother     Stroke Mother     Heart attack Father     Cancer Brother     Throat cancer Brother     Diabetes Maternal Aunt     Diabetes Maternal Uncle     Heart disease Maternal Uncle     Diabetes Paternal Aunt     Heart disease Paternal Aunt     Heart disease  "Paternal Uncle     Heart disease Maternal Grandmother     Cancer Maternal Grandfather        ALLERGIES AND MEDICATIONS: updated and reviewed.  Review of patient's allergies indicates:   Allergen Reactions    Codeine Nausea Only     weak    Ranexa [ranolazine]      Medication List with Changes/Refills   Current Medications    ALBUTEROL 90 MCG/ACTUATION INHALER    Inhale 2 puffs into the lungs every 6 (six) hours as needed for Wheezing or Shortness of Breath.    BD INSULIN PEN NEEDLE UF SHORT 31 GAUGE X 5/16" NDLE        BD INSULIN SYRINGE ULTRA-FINE 1/2 ML 31 GAUGE X 15/64" SYRG        CARVEDILOL (COREG) 25 MG TABLET    Take 1 tablet (25 mg total) by mouth 2 (two) times daily. 1 Tablet Oral Twice a day    CINNAMON BARK 500 MG CAPSULE    Take 1,000 mg by mouth once daily.      CLOPIDOGREL (PLAVIX) 75 MG TABLET    Take 75 mg by mouth once daily.    DIAZEPAM (VALIUM) 2 MG TABLET    Take 2 mg by mouth continuous prn.    DULOXETINE (CYMBALTA) 30 MG CAPSULE    TAKE 1 CAPSULE BY MOUTH ONCE DAILY    FUROSEMIDE (LASIX) 40 MG TABLET    Take 40 mg by mouth once daily.     GABAPENTIN (NEURONTIN) 300 MG CAPSULE    TAKE 2 CAPSULES BY MOUTH 3 TIMES DAILY    GLIMEPIRIDE (AMARYL) 4 MG TABLET    Take 4 mg by mouth daily with breakfast.     INSULIN NPH-INSULIN REGULAR, 70/30, (NOVOLIN 70/30) 100 UNIT/ML (70-30) INJECTION    Inject into the skin. Inject 5 units at breakfast and inject 10 units at night    INSULIN SYRINGE-NEEDLE U-100 1/2 ML 31 X 5/16" SYRG        LOSARTAN (COZAAR) 100 MG TABLET    Take 1 tablet (100 mg total) by mouth once daily.    METFORMIN (GLUCOPHAGE) 500 MG TABLET    Take 1,000 mg by mouth 2 (two) times daily with meals. 2 Tablets in the morning, 2 Tablets in the evening    NITROGLYCERIN (NITROSTAT) 0.3 MG SL TABLET    PLACE 1 TABLET UNDER TONGUE AS NEEDED FOR CHEST PAIN EVERY 5 MINUTES, UP TO 3 DOSES IN 15MINUTES    OM-3/DHA/EPA/FISH OIL/VIT D3 (FISH OIL-VIT D3 ORAL)    Take 2,000 Units by mouth once daily.  "    PRAVASTATIN (PRAVACHOL) 20 MG TABLET    Take 20 mg by mouth once daily.    RIVASTIGMINE TARTRATE (EXELON) 1.5 MG CAPSULE    Take 1 capsule (1.5 mg total) by mouth 2 (two) times daily.    SPIRONOLACTONE (ALDACTONE) 25 MG TABLET    25 mg once daily.     VIT C/VIT E AC/LUT/COPPER/ZINC (PRESERVISION LUTEIN ORAL)    Take by mouth.    WARFARIN (COUMADIN) 5 MG TABLET    Take 2 tabs by mouth on Tuesday,Thursday, Saturday and Sundays then 1.5 on all other days.         CARE TEAM:  Patient Care Team:  Kasie Edmondson MD as PCP - General (Internal Medicine)  Jac Rodriguez OD as Consulting Provider (Optometry)  Trever Arevalo MD as Consulting Physician (Ophthalmology)  Philippe Aquino MD as Consulting Physician (Endocrinology)  Mac Valderrama MD as Consulting Physician (Cardiology)  Thierno Santiago MD as Consulting Physician (Cardiology)  Marifer Romero DPM as Consulting Physician (Podiatry)           SCREENING HISTORY:  Health Maintenance       Date Due Completion Date    Influenza Vaccine 08/01/2018 10/14/2017    Lipid Panel 01/24/2019 1/24/2018    Override on 6/6/2016: Done (total 104, HDL 32, LDL 57, TG 73)    Override on 1/27/2015: Done (HDL=36, TG=38, LDL=57)    Urine Microalbumin 01/24/2019 1/24/2018    Override on 6/6/2016: Done (43)    Override on 1/27/2015: Done (Elevated)    Hemoglobin A1c 02/08/2019 8/8/2018    Override on 12/9/2016: Done (A1c - 7.5)    Override on 6/6/2016: Done (7.0)    Override on 1/27/2015: Done (7.9%)    Eye Exam 03/16/2019 3/16/2018 (Done)    Override on 3/16/2018: Done    Override on 9/15/2017: Done (Dr. Trever Arevalo/[facility]-positive for mild diabetic retinopathy and severe macular degeneration bilaterally, positive for pseudophakia (R) eye & negative (L),negative for cataracts and glaucoma bilaterally)    Override on 2/3/2017: Done (Dr. Arevalo)    Override on 1/22/2016: Done (Jennifer - mild bilateral diabetic retinopathy; severe bilateral dry macular  "degeneration)    Override on 5/8/2015: Done    Override on 10/10/2014: Done    Foot Exam 06/20/2019 6/20/2018 (Done)    Override on 6/20/2018: Done    Override on 12/4/2017: Done    Override on 9/13/2017: Done    Override on 7/12/2017: Done    Override on 5/11/2017: Done    High Dose Statin 08/10/2019 8/10/2018    Colonoscopy 10/04/2020 10/4/2017    Override on 8/6/2007: Done    TETANUS VACCINE 05/23/2023 5/23/2013            REVIEW OF SYSTEMS:   The patient reports fair dietary habits.  The patient does not exercise regularly.  Review of Systems   Constitutional: Negative for chills, fatigue, fever and unexpected weight change.   HENT: Negative for congestion, ear pain, sore throat and voice change.    Eyes: Positive for visual disturbance. Negative for photophobia, pain and discharge.   Respiratory: Negative for cough, shortness of breath and wheezing.    Cardiovascular: Negative for chest pain, palpitations and leg swelling.   Gastrointestinal: Positive for diarrhea (- intermittent/chronic - often after meals (will discuss with his endocrinologist - likely due to metformin)). Negative for abdominal pain, blood in stool, constipation, nausea and vomiting.   Endocrine:        - always 'cold'   Genitourinary: Negative for dysuria and frequency.   Musculoskeletal: Positive for arthralgias and gait problem (- walks with a cane). Negative for joint swelling and neck stiffness.   Skin: Negative for color change and rash.   Neurological: Negative for seizures, weakness and headaches.   Hematological: Negative for adenopathy. Does not bruise/bleed easily.   Psychiatric/Behavioral: Negative for behavioral problems and dysphoric mood. The patient is not nervous/anxious.            PHYSICAL EXAM:  Vitals:    08/10/18 1411   BP: 128/64   Pulse: 73   Temp: 98.4 °F (36.9 °C)     Weight: 84.6 kg (186 lb 9.9 oz)   Height: 5' 8" (172.7 cm)   Body mass index is 28.38 kg/m².    Physical Examination: General appearance - alert, well " appearing, and in no distress and overweight  Mental status - alert, oriented to person, place, and time, normal mood, behavior, speech, dress, motor activity, and thought processes  Eyes - sclera anicteric  Mouth - mucous membranes moist, pharynx normal without lesions  Neck - supple, no significant adenopathy, carotids upstroke normal bilaterally, no bruits, thyroid exam: thyroid is normal in size without nodules or tenderness  Lymphatics - no palpable lymphadenopathy  Chest - clear to auscultation, no wheezes, rales or rhonchi, symmetric air entry  Heart - normal rate and regular rhythm, no gallops noted  Abdomen - soft, nontender, nondistended, no masses or organomegaly   Male - not examined  Back exam - not examined  Neurological - alert, oriented, normal speech, no focal findings or movement disorder noted, cranial nerves II through XII intact  Musculoskeletal - no muscular tenderness noted, osteoarthritic changes noted in both hands, Moderate osteoarthritic changes noted to both knee joints. No joint effusions noted. Walks with a cane.  Extremities - no pedal edema noted  Skin - normal coloration and turgor, no rashes, no suspicious skin lesions noted; senile purpura    Hemoglobin A1C   Date Value Ref Range Status   08/08/2018 7.2 (H) 4.0 - 5.6 % Final     Comment:     ADA Screening Guidelines:  5.7-6.4%  Consistent with prediabetes  >or=6.5%  Consistent with diabetes  High levels of fetal hemoglobin interfere with the HbA1C  assay. Heterozygous hemoglobin variants (HbS, HgC, etc)do  not significantly interfere with this assay.   However, presence of multiple variants may affect accuracy.     01/23/2018 6.9 (H) 4.0 - 5.6 % Final     Comment:     Dr. Philippe Aquino(Endocrinology)   10/22/2013 7.4 (H) 4.5 - 6.2 % Final           ASSESSMENT AND PLAN:  1. Routine medical exam  Counseled on age appropriate medical preventative services including age appropriate cancer screenings, age appropriate eye and dental  exams, over all nutritional health, need for a consistent exercise regimen, and an over all push towards maintaining a vigorous and active lifestyle.  Counseled on age appropriate vaccines and discussed upcoming health care needs based on age/gender. Discussed good sleep hygiene and stress management.     2. 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 Type 2 diabetes, uncontrolled, with neuropathy/4. Type 2 diabetes, uncontrolled, with peripheral circulatory disorder/5. 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 Insulin long-term use  Diabetes currently is controlled for age and comorbid conditions. We discussed diabetic diet and regular exercise. We discussed home blood sugar monitoring, if appropriate. The current medical regimen is effective;  continue present plan and medications. Followed by dermatology.     7. Essential hypertension  Discussed sodium restriction, maintaining ideal body weight and regular exercise program as physiologic means to achieve blood pressure control. The patient will strive towards this. The current medical regimen is effective;  continue present plan and medications. Recommended patient to check home readings to monitor and see me for followup as scheduled or sooner as needed. Patient was educated that both decongestant and anti-inflammatory medication may raise blood pressure.     8. Paroxysmal atrial fibrillation/9. Chronic stable angina  Stable. Observe. Followed by cardiology.     10. Hyperlipidemia LDL goal <100  We discussed low fat diet and regular exercise.The current medical regimen is effective;  continue present plan and medications.      11. Chronic obstructive pulmonary disease, unspecified COPD type  Stable. Observe. Inhalers as needed.    12. Overweight (BMI 25.0-29.9)  The patient is  asked to make an attempt to improve diet and exercise patterns to aid in medical management of this problem.     13. Flu vaccine need  Recommended to get this fall.    14. Advanced directives, counseling/discussion  I had a discussion today with the patient regarding advanced directives.  Advanced directives were discussed due to patient's age and/or chronic medical conditions. Prognosis based on current condition: fair. Paperwork was given to complete living will and medical POA. [place] was not discussed. Approximately 5 minute(s) spent on counseling/discussion regarding end of life decision making.            Follow-up in about 6 months (around 2/10/2019), or if symptoms worsen or fail to improve, for follow up chronic medical conditions.. or sooner as needed.

## 2018-08-22 ENCOUNTER — OFFICE VISIT (OUTPATIENT)
Dept: PODIATRY | Facility: CLINIC | Age: 78
End: 2018-08-22
Payer: MEDICARE

## 2018-08-22 VITALS
DIASTOLIC BLOOD PRESSURE: 70 MMHG | HEIGHT: 68 IN | WEIGHT: 186 LBS | SYSTOLIC BLOOD PRESSURE: 142 MMHG | BODY MASS INDEX: 28.19 KG/M2

## 2018-08-22 DIAGNOSIS — L84 CORN OR CALLUS: ICD-10-CM

## 2018-08-22 DIAGNOSIS — B35.1 ONYCHOMYCOSIS DUE TO DERMATOPHYTE: ICD-10-CM

## 2018-08-22 DIAGNOSIS — E11.49 TYPE II DIABETES MELLITUS WITH NEUROLOGICAL MANIFESTATIONS: ICD-10-CM

## 2018-08-22 PROCEDURE — 11721 DEBRIDE NAIL 6 OR MORE: CPT | Mod: 59,Q9,S$GLB, | Performed by: PODIATRIST

## 2018-08-22 PROCEDURE — 11056 PARNG/CUTG B9 HYPRKR LES 2-4: CPT | Mod: Q9,S$GLB,, | Performed by: PODIATRIST

## 2018-08-22 PROCEDURE — 99499 UNLISTED E&M SERVICE: CPT | Mod: S$GLB,,, | Performed by: PODIATRIST

## 2018-08-22 PROCEDURE — 99999 PR PBB SHADOW E&M-EST. PATIENT-LVL III: CPT | Mod: PBBFAC,,, | Performed by: PODIATRIST

## 2018-08-22 NOTE — PROGRESS NOTES
Subjective:      Patient ID: Percy Ramirez is a 77 y.o. male.    Chief Complaint: Diabetes Mellitus (8/10/18 [place]); Diabetic Foot Exam; and Nail Care    Percy is a 77 y.o. male who presents to the clinic for evaluation and treatment of high risk feet. Percy has a past medical history of Allergy, Arthritis, CAD, multiple vessel, Cataract, Depression, Hyperlipidemia, Hypertension, Macular degeneration, S/P CABG x 2, and Type 2 diabetes mellitus with circulatory disorder. The patient's chief complaint is elongated, thickened toenails aggravated by increased weight bearing, shoe gear, pressure.    This patient has documented high risk feet requiring routine maintenance secondary to diabetes mellitis and those secondary complications of diabetes, as mentioned..    PCP: Kasie Edmondson MD    Date Last Seen by PCP:   Chief Complaint   Patient presents with    Diabetes Mellitus     8/10/18 [place]vis    Diabetic Foot Exam    Nail Care     Current shoe gear:  rx shoes    Hemoglobin A1C   Date Value Ref Range Status   08/08/2018 7.2 (H) 4.0 - 5.6 % Final     Comment:     ADA Screening Guidelines:  5.7-6.4%  Consistent with prediabetes  >or=6.5%  Consistent with diabetes  High levels of fetal hemoglobin interfere with the HbA1C  assay. Heterozygous hemoglobin variants (HbS, HgC, etc)do  not significantly interfere with this assay.   However, presence of multiple variants may affect accuracy.     01/23/2018 6.9 (H) 4.0 - 5.6 % Final     Comment:     Dr. Philippe Aquino(Endocrinology)   10/22/2013 7.4 (H) 4.5 - 6.2 % Final     Patient Active Problem List   Diagnosis    Major depressive disorder, recurrent episode, mild    Hypertension    Type 2 diabetes, uncontrolled, with retinopathy    CAD, multiple vessel    S/P CABG x 2    Macular degeneration    Obstructive sleep apnea on CPAP    Anxiety state, unspecified    Insulin long-term use    Primary osteoarthritis of both knees    Chronic low back pain    DJD  "(degenerative joint disease), lumbar    Type 2 diabetes, uncontrolled, with neuropathy    Bilateral carotid artery disease    Hyperlipidemia LDL goal <100    Type 2 diabetes, uncontrolled, with peripheral circulatory disorder    COPD (chronic obstructive pulmonary disease)    Atherosclerosis of abdominal aorta    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 18 g 0    BD INSULIN PEN NEEDLE UF SHORT 31 gauge x 5/16" Ndle       BD INSULIN SYRINGE ULTRA-FINE 1/2 mL 31 gauge x 15/64" Syrg       carvedilol (COREG) 25 MG tablet Take 1 tablet (25 mg total) by mouth 2 (two) times daily. 1 Tablet Oral Twice a day 180 tablet 0    cinnamon bark 500 mg capsule Take 1,000 mg by mouth once daily.        clopidogrel (PLAVIX) 75 mg tablet Take 75 mg by mouth once daily.      diazepam (VALIUM) 2 MG tablet Take 2 mg by mouth continuous prn.      DULoxetine (CYMBALTA) 30 MG capsule TAKE 1 CAPSULE BY MOUTH ONCE DAILY 90 capsule 0    furosemide (LASIX) 40 MG tablet Take 40 mg by mouth once daily.       gabapentin (NEURONTIN) 300 MG capsule TAKE 2 CAPSULES BY MOUTH 3 TIMES DAILY 540 capsule 1    glimepiride (AMARYL) 4 MG tablet Take 4 mg by mouth daily with breakfast.       insulin NPH-insulin regular, 70/30, (NOVOLIN 70/30) 100 unit/mL (70-30) injection Inject into the skin. Inject 5 units at breakfast and inject 10 units at night      insulin syringe-needle U-100 1/2 mL 31 x 5/16" Syrg       losartan (COZAAR) 100 MG tablet Take 1 tablet (100 mg total) by " mouth once daily. 90 tablet 0    metformin (GLUCOPHAGE) 500 MG tablet Take 1,000 mg by mouth 2 (two) times daily with meals. 2 Tablets in the morning, 2 Tablets in the evening      nitroGLYCERIN (NITROSTAT) 0.3 MG SL tablet PLACE 1 TABLET UNDER TONGUE AS NEEDED FOR CHEST PAIN EVERY 5 MINUTES, UP TO 3 DOSES IN 15MINUTES  2    OM-3/DHA/EPA/FISH OIL/VIT D3 (FISH OIL-VIT D3 ORAL) Take 2,000 Units by mouth once daily.       pravastatin (PRAVACHOL) 20 MG tablet Take 20 mg by mouth once daily.      rivastigmine tartrate (EXELON) 1.5 MG capsule Take 1 capsule (1.5 mg total) by mouth 2 (two) times daily. 60 capsule 11    spironolactone (ALDACTONE) 25 MG tablet 25 mg once daily.       VIT C/VIT E AC/LUT/COPPER/ZINC (PRESERVISION LUTEIN ORAL) Take by mouth.      warfarin (COUMADIN) 5 MG tablet Take 2 tabs by mouth on Tuesday,Thursday, Saturday and Sundays then 1.5 on all other days.       No current facility-administered medications on file prior to visit.      Review of patient's allergies indicates:   Allergen Reactions    Codeine Nausea Only     weak     Past Surgical History:   Procedure Laterality Date    broken elbow      left    EYE SURGERY      cataract    heart bypass      2004    HERNIA REPAIR      right    ROTATOR CUFF REPAIR      right  2004     Family History   Problem Relation Age of Onset    Arthritis Mother     Diabetes Mother     Stroke Mother     Heart attack Father     Cancer Brother     Throat cancer Brother     Diabetes Maternal Aunt     Diabetes Maternal Uncle     Heart disease Maternal Uncle     Diabetes Paternal Aunt     Heart disease Paternal Aunt     Heart disease Paternal Uncle     Heart disease Maternal Grandmother     Cancer Maternal Grandfather      Social History     Socioeconomic History    Marital status:      Spouse name: Not on file    Number of children: 4    Years of education: GED    Highest education level: Not on file   Social Needs    Financial  "resource strain: Not on file    Food insecurity - worry: Not on file    Food insecurity - inability: Not on file    Transportation needs - medical: Not on file    Transportation needs - non-medical: Not on file   Occupational History    Occupation: retired tug    Tobacco Use    Smoking status: Former Smoker     Packs/day: 3.00     Years: 45.00     Pack years: 135.00     Types: Cigarettes     Last attempt to quit: 2004     Years since quittin.3    Smokeless tobacco: Never Used   Substance and Sexual Activity    Alcohol use: Yes     Comment: was heavy drinker 35 years ago, rare use now    Drug use: No    Sexual activity: Yes     Partners: Female   Other Topics Concern    Not on file   Social History Narrative    Not on file     Review of Systems   Constitution: Negative for chills, fever and weakness.   Cardiovascular: Negative for claudication and leg swelling.   Respiratory: Positive for cough. Negative for shortness of breath.    Skin: Positive for dry skin and nail changes. Negative for itching and rash.   Musculoskeletal: Positive for arthritis, back pain and joint pain. Negative for falls, joint swelling and muscle weakness.   Gastrointestinal: Negative for diarrhea, nausea and vomiting.   Neurological: Positive for numbness and paresthesias. Negative for tremors.   Psychiatric/Behavioral: Negative for altered mental status and hallucinations.           Objective:       Vitals:    18 1024   BP: (!) 142/70   Weight: 84.4 kg (186 lb)   Height: 5' 8" (1.727 m)       Physical Exam   Constitutional:   General: Pt. is well-developed, well-nourished, appears stated age, in no acute distress, alert and oriented x 3. No evidence of depression, anxiety, or agitation. Calm, cooperative, and communicative. Appropriate interactions and affect.       Cardiovascular:   Pulses:       Dorsalis pedis pulses are 2+ on the right side, and 2+ on the left side.        Posterior tibial pulses are " 1+ on the right side, and 1+ on the left side.   There is decreased digital hair.   Musculoskeletal:        Right foot: There is normal range of motion.        Left foot:  There is normal range of motion.   Muscle strength is 5/5 in all groups bilaterally.    Decreased stride, station of gait.  apropulsive toe off.  Increased angle and base of gait.    Patient has hammertoes of digits 2-5 bilateral partially reducible without symptom today.    Visible and palpable bunion without pain at dorsomedial 1st metatarsal head right and left.  Hallux abducted right and left partially reducible, tracks laterally without being track bound.  No ecchymosis, erythema, edema, or cardinal signs infection or signs of trauma same foot.     Neurological: A sensory deficit is present.   Harrah-Keon 5.07 monofilamant testing is diminished Farhad feet. Sharp/dull sensation diminished Bilaterally   Skin: Skin is warm, dry and intact. No abrasion, no ecchymosis, no lesion and no rash noted. He is not diaphoretic. No cyanosis or erythema. No pallor. Nails show no clubbing.   Toenails 1-5 bilaterally are elongated by 2-3 mm, thickened by 2-3 mm, discolored/yellowed, dystrophic, brittle with subungual debris.    Focal hyperkeratotic lesion consisting entirely of hyperkeratotic tissue without open skin, drainage, pus, fluctuance, malodor, or signs of infection: medial IPJ of hallux farhad    Interdigital Spaces clean, dry and without evidence of break in skin integrity   Psychiatric: He has a normal mood and affect. His speech is normal.   Nursing note and vitals reviewed.        Assessment:       Encounter Diagnoses   Name Primary?    Type 2 diabetes, uncontrolled, with peripheral circulatory disorder Yes    Type II diabetes mellitus with neurological manifestations     Onychomycosis due to dermatophyte     Corn or callus          Plan:       Percy was seen today for diabetes mellitus, diabetic foot exam and nail care.    Diagnoses and  all orders for this visit:    Type 2 diabetes, uncontrolled, with peripheral circulatory disorder    Type II diabetes mellitus with neurological manifestations    Onychomycosis due to dermatophyte    Corn or callus      I counseled the patient on his conditions, their implications and medical management.      - Shoe inspection. Diabetic Foot Education. Patient reminded of the importance of good nutrition and blood sugar control to help prevent podiatric complications of diabetes. Patient instructed on proper foot hygeine. We discussed wearing proper shoe gear, daily foot inspections, never walking without protective shoe gear, never putting sharp instruments to feet    - With patient's permission, nails were aggressively reduced and debrided x 10 to their soft tissue attachment mechanically and with electric , removing all offending nail and debris. Patient relates relief following the procedure.     - After cleansing the  area w/ alcohol prep pad the above mentioned hyperkeratosis was trimmed utilizing No 15 scapel, to a smooth base with out incident. Patient tolerated this  well and reported comfort to the area of medial hallux IPJ concha    - He will continue to monitor the areas daily, inspect his feet, wear protective shoe gear when ambulatory, moisturizer to maintain skin integrity and follow in this office in approximately 60-70 days, sooner PRN

## 2018-08-25 DIAGNOSIS — I10 ESSENTIAL HYPERTENSION: ICD-10-CM

## 2018-08-27 RX ORDER — CARVEDILOL 25 MG/1
TABLET ORAL
Qty: 180 TABLET | Refills: 1 | Status: SHIPPED | OUTPATIENT
Start: 2018-08-27 | End: 2019-03-06 | Stop reason: SDUPTHER

## 2018-09-11 ENCOUNTER — TELEPHONE (OUTPATIENT)
Dept: FAMILY MEDICINE | Facility: CLINIC | Age: 78
End: 2018-09-11

## 2018-09-11 NOTE — TELEPHONE ENCOUNTER
----- Message from Amy He sent at 9/11/2018  1:58 PM CDT -----  Contact: Self/ [phone]  Pt calling to ask if office if they have record of his Pneumoccal shot from last year at [facility]. Please call to advise. Thank you.

## 2018-09-11 NOTE — TELEPHONE ENCOUNTER
Advised pt that he received a flu shot last year in October.   Pt states he couldn't remember what vaccination he received. Pt states he will receive vaccination from  [facility].

## 2018-09-16 RX ORDER — LOSARTAN POTASSIUM 100 MG/1
TABLET ORAL
Qty: 90 TABLET | Refills: 1 | Status: SHIPPED | OUTPATIENT
Start: 2018-09-16 | End: 2019-03-21 | Stop reason: SDUPTHER

## 2018-10-17 DIAGNOSIS — J44.1 COPD EXACERBATION: ICD-10-CM

## 2018-10-17 RX ORDER — ALBUTEROL SULFATE 90 UG/1
2 AEROSOL, METERED RESPIRATORY (INHALATION) EVERY 6 HOURS PRN
Qty: 18 G | Refills: 0 | Status: SHIPPED | OUTPATIENT
Start: 2018-10-17 | End: 2019-03-13 | Stop reason: SDUPTHER

## 2018-10-18 ENCOUNTER — TELEPHONE (OUTPATIENT)
Dept: FAMILY MEDICINE | Facility: CLINIC | Age: 78
End: 2018-10-18

## 2018-10-18 NOTE — TELEPHONE ENCOUNTER
Spoke with patient and he said that the albuterol inhaler is now $35.00 and he can not afford this. Patient wants to know if provider can order him a cheaper inhaler.

## 2018-10-18 NOTE — TELEPHONE ENCOUNTER
----- Message from Rachael Vicente sent at 10/18/2018 12:18 PM CDT -----  Contact: Self  Patient called requesting a cheaper inhaler for the listed medication. Please call to advise at [phone]

## 2018-10-22 DIAGNOSIS — F33.0 MAJOR DEPRESSIVE DISORDER, RECURRENT EPISODE, MILD: ICD-10-CM

## 2018-10-22 RX ORDER — DULOXETIN HYDROCHLORIDE 30 MG/1
30 CAPSULE, DELAYED RELEASE ORAL DAILY
Qty: 90 CAPSULE | Refills: 0 | Status: SHIPPED | OUTPATIENT
Start: 2018-10-22 | End: 2019-02-05 | Stop reason: SDUPTHER

## 2018-10-22 RX ORDER — DULOXETIN HYDROCHLORIDE 30 MG/1
CAPSULE, DELAYED RELEASE ORAL
Qty: 90 CAPSULE | Refills: 0 | Status: CANCELLED | OUTPATIENT
Start: 2018-10-22

## 2018-10-22 NOTE — TELEPHONE ENCOUNTER
Spoke with patient and informed him; after provider spoke with [facility] and this is the cheapest inhaler on there tier plan.

## 2018-11-07 ENCOUNTER — OFFICE VISIT (OUTPATIENT)
Dept: PODIATRY | Facility: CLINIC | Age: 78
End: 2018-11-07
Payer: MEDICARE

## 2018-11-07 VITALS
SYSTOLIC BLOOD PRESSURE: 130 MMHG | HEIGHT: 68 IN | WEIGHT: 186.06 LBS | BODY MASS INDEX: 28.2 KG/M2 | DIASTOLIC BLOOD PRESSURE: 86 MMHG

## 2018-11-07 DIAGNOSIS — M20.11 HALLUX ABDUCTO VALGUS, RIGHT: ICD-10-CM

## 2018-11-07 DIAGNOSIS — M20.12 HALLUX ABDUCTO VALGUS, LEFT: ICD-10-CM

## 2018-11-07 DIAGNOSIS — M20.10 HALLUX ABDUCTO VALGUS, UNSPECIFIED LATERALITY: ICD-10-CM

## 2018-11-07 DIAGNOSIS — E11.49 TYPE II DIABETES MELLITUS WITH NEUROLOGICAL MANIFESTATIONS: ICD-10-CM

## 2018-11-07 DIAGNOSIS — B35.1 ONYCHOMYCOSIS DUE TO DERMATOPHYTE: ICD-10-CM

## 2018-11-07 DIAGNOSIS — L84 CORN OR CALLUS: ICD-10-CM

## 2018-11-07 DIAGNOSIS — M20.41 HAMMER TOES OF BOTH FEET: ICD-10-CM

## 2018-11-07 DIAGNOSIS — M20.42 HAMMER TOES OF BOTH FEET: ICD-10-CM

## 2018-11-07 PROCEDURE — 99499 UNLISTED E&M SERVICE: CPT | Mod: S$GLB,,, | Performed by: PODIATRIST

## 2018-11-07 PROCEDURE — 11721 DEBRIDE NAIL 6 OR MORE: CPT | Mod: 59,Q9,S$GLB, | Performed by: PODIATRIST

## 2018-11-07 PROCEDURE — 99999 PR PBB SHADOW E&M-EST. PATIENT-LVL III: CPT | Mod: PBBFAC,,, | Performed by: PODIATRIST

## 2018-11-07 PROCEDURE — 11056 PARNG/CUTG B9 HYPRKR LES 2-4: CPT | Mod: Q9,S$GLB,, | Performed by: PODIATRIST

## 2018-11-07 NOTE — PROGRESS NOTES
Subjective:      Patient ID: Percy Ramirez is a 78 y.o. male.    Chief Complaint: Diabetes Mellitus (70 days follow up /pcp Delvis 8-); Diabetic Foot Exam; and Nail Care    Percy is a 78 y.o. male who presents to the clinic for evaluation and treatment of high risk feet. Percy has a past medical history of Allergy, Arthritis, CAD, multiple vessel, Cataract, Depression, Hyperlipidemia, Hypertension, Macular degeneration, S/P CABG x 2, and Type 2 diabetes mellitus with circulatory disorder. The patient's chief complaint is elongated, thickened toenails aggravated by increased weight bearing, shoe gear, pressure.    This patient has documented high risk feet requiring routine maintenance secondary to diabetes mellitis and those secondary complications of diabetes, as mentioned..    PCP: Kasie Edmondson MD    Date Last Seen by PCP:   Chief Complaint   Patient presents with    Diabetes Mellitus     70 days follow up /pcp Delvis 8-    Diabetic Foot Exam    Nail Care     Current shoe gear:  rx shoes    Hemoglobin A1C   Date Value Ref Range Status   08/08/2018 7.2 (H) 4.0 - 5.6 % Final     Comment:     ADA Screening Guidelines:  5.7-6.4%  Consistent with prediabetes  >or=6.5%  Consistent with diabetes  High levels of fetal hemoglobin interfere with the HbA1C  assay. Heterozygous hemoglobin variants (HbS, HgC, etc)do  not significantly interfere with this assay.   However, presence of multiple variants may affect accuracy.     01/23/2018 6.9 (H) 4.0 - 5.6 % Final     Comment:     Dr. Philippe Aquino(Endocrinology)   10/22/2013 7.4 (H) 4.5 - 6.2 % Final     Patient Active Problem List   Diagnosis    Major depressive disorder, recurrent episode, mild    Hypertension    Type 2 diabetes, uncontrolled, with retinopathy    CAD, multiple vessel    S/P CABG x 2    Macular degeneration    Obstructive sleep apnea on CPAP    Anxiety state, unspecified    Insulin long-term use    Primary osteoarthritis  "of both knees    Chronic low back pain    DJD (degenerative joint disease), lumbar    Type 2 diabetes, uncontrolled, with neuropathy    Bilateral carotid artery disease    Hyperlipidemia LDL goal <100    Type 2 diabetes, uncontrolled, with peripheral circulatory disorder    COPD (chronic obstructive pulmonary disease)    Atherosclerosis of abdominal aorta    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 18 g 0    BD INSULIN PEN NEEDLE UF SHORT 31 gauge x 5/16" Ndle       BD INSULIN SYRINGE ULTRA-FINE 1/2 mL 31 gauge x 15/64" Syrg       carvedilol (COREG) 25 MG tablet TAKE 1 TABLET BY MOUTH TWICE DAILY 180 tablet 1    cinnamon bark 500 mg capsule Take 1,000 mg by mouth once daily.        clopidogrel (PLAVIX) 75 mg tablet Take 75 mg by mouth once daily.      diazepam (VALIUM) 2 MG tablet Take 2 mg by mouth continuous prn.      DULoxetine (CYMBALTA) 30 MG capsule Take 1 capsule (30 mg total) by mouth once daily. 90 capsule 0    furosemide (LASIX) 40 MG tablet Take 40 mg by mouth once daily.       gabapentin (NEURONTIN) 300 MG capsule TAKE 2 CAPSULES BY MOUTH 3 TIMES DAILY 540 capsule 1    glimepiride (AMARYL) 4 MG tablet Take 4 mg by mouth daily with breakfast.       insulin NPH-insulin regular, 70/30, (NOVOLIN 70/30) 100 unit/mL (70-30) injection Inject into the skin. Inject 5 units at breakfast and inject 10 units at night      insulin syringe-needle U-100 1/2 mL 31 x 5/16" Syrg       losartan (COZAAR) 100 MG " tablet TAKE 1 TABLET BY MOUTH ONCE DAILY 90 tablet 1    metformin (GLUCOPHAGE) 500 MG tablet Take 1,000 mg by mouth 2 (two) times daily with meals. 2 Tablets in the morning, 2 Tablets in the evening      nitroGLYCERIN (NITROSTAT) 0.3 MG SL tablet PLACE 1 TABLET UNDER TONGUE AS NEEDED FOR CHEST PAIN EVERY 5 MINUTES, UP TO 3 DOSES IN 15MINUTES  2    OM-3/DHA/EPA/FISH OIL/VIT D3 (FISH OIL-VIT D3 ORAL) Take 2,000 Units by mouth once daily.       pravastatin (PRAVACHOL) 20 MG tablet Take 20 mg by mouth once daily.      rivastigmine tartrate (EXELON) 1.5 MG capsule Take 1 capsule (1.5 mg total) by mouth 2 (two) times daily. 60 capsule 11    spironolactone (ALDACTONE) 25 MG tablet 25 mg once daily.       VIT C/VIT E AC/LUT/COPPER/ZINC (PRESERVISION LUTEIN ORAL) Take by mouth.      warfarin (COUMADIN) 5 MG tablet Take 2 tabs by mouth on Tuesday,Thursday, Saturday and Sundays then 1.5 on all other days.       No current facility-administered medications on file prior to visit.      Review of patient's allergies indicates:   Allergen Reactions    Codeine Nausea Only     weak     Past Surgical History:   Procedure Laterality Date    broken elbow      left    CARPAL METACARPAL INTERPOSITIONAL ARTHROPLASTY-THUMB Right 11/22/2017    Per[facility] by Malissa Perez III, [facility] at [facility] OR    COLONOSCOPY N/A 10/4/2017    Procedure: COLONOSCOPY;  Surgeon: Gabriel Leung MD;  Location: [facility] ENDO;  Service: Endoscopy;  Laterality: N/A;    COLONOSCOPY N/A 10/4/2017    Performed by Gabriel Leung MD at [facility] ENDO    EYE SURGERY      cataract    heart bypass      2004    HERNIA REPAIR      right    RECONSTRUCTION-TENDON-FLEXOR CARPI RADIAS Right 11/22/2017    [facility] Perez [facility] at [facility] OR    ROTATOR CUFF REPAIR      right  2004     Family History   Problem Relation Age of Onset    Arthritis Mother     Diabetes Mother     Stroke Mother     Heart attack Father     Cancer Brother     Throat cancer  Brother     Diabetes Maternal Aunt     Diabetes Maternal Uncle     Heart disease Maternal Uncle     Diabetes Paternal Aunt     Heart disease Paternal Aunt     Heart disease Paternal Uncle     Heart disease Maternal Grandmother     Cancer Maternal Grandfather      Social History     Socioeconomic History    Marital status:      Spouse name: Not on file    Number of children: 4    Years of education: GED    Highest education level: Not on file   Social Needs    Financial resource strain: Not on file    Food insecurity - worry: Not on file    Food insecurity - inability: Not on file    Transportation needs - medical: Not on file    Transportation needs - non-medical: Not on file   Occupational History    Occupation: retired tug    Tobacco Use    Smoking status: Former Smoker     Packs/day: 3.00     Years: 45.00     Pack years: 135.00     Types: Cigarettes     Last attempt to quit: 2004     Years since quittin.5    Smokeless tobacco: Never Used   Substance and Sexual Activity    Alcohol use: Yes     Comment: was heavy drinker 35 years ago, rare use now    Drug use: No    Sexual activity: Yes     Partners: Female   Other Topics Concern    Not on file   Social History Narrative    Not on file     Review of Systems   Constitution: Negative for chills, fever and weakness.   Cardiovascular: Negative for claudication and leg swelling.   Respiratory: Positive for cough. Negative for shortness of breath.    Skin: Positive for dry skin and nail changes. Negative for itching and rash.   Musculoskeletal: Positive for arthritis, back pain and joint pain. Negative for falls, joint swelling and muscle weakness.   Gastrointestinal: Negative for diarrhea, nausea and vomiting.   Neurological: Positive for numbness and paresthesias. Negative for tremors.   Psychiatric/Behavioral: Negative for altered mental status and hallucinations.           Objective:       Vitals:    18 1015  "  BP: 130/86   Weight: 84.4 kg (186 lb 1.1 oz)   Height: 5' 8" (1.727 m)   PainSc: 10-Worst pain ever       Physical Exam   Constitutional:   General: Pt. is well-developed, well-nourished, appears stated age, in no acute distress, alert and oriented x 3. No evidence of depression, anxiety, or agitation. Calm, cooperative, and communicative. Appropriate interactions and affect.       Cardiovascular:   Pulses:       Dorsalis pedis pulses are 2+ on the right side, and 2+ on the left side.        Posterior tibial pulses are 1+ on the right side, and 1+ on the left side.   There is decreased digital hair.   Musculoskeletal:        Right foot: There is normal range of motion.        Left foot:  There is normal range of motion.   Muscle strength is 5/5 in all groups bilaterally.    Decreased stride, station of gait.  apropulsive toe off.  Increased angle and base of gait.    Patient has hammertoes of digits 2-5 bilateral partially reducible without symptom today.    Visible and palpable bunion without pain at dorsomedial 1st metatarsal head right and left.  Hallux abducted right and left partially reducible, tracks laterally without being track bound.  No ecchymosis, erythema, edema, or cardinal signs infection or signs of trauma same foot.     Neurological: A sensory deficit is present.   Ravenna-Keon 5.07 monofilamant testing is diminished Farhad feet. Sharp/dull sensation diminished Bilaterally   Skin: Skin is warm, dry and intact. No abrasion, no ecchymosis, no lesion and no rash noted. He is not diaphoretic. No cyanosis or erythema. No pallor. Nails show no clubbing.   Toenails 1-5 bilaterally are elongated by 2-3 mm, thickened by 2-3 mm, discolored/yellowed, dystrophic, brittle with subungual debris.    Focal hyperkeratotic lesion consisting entirely of hyperkeratotic tissue without open skin, drainage, pus, fluctuance, malodor, or signs of infection: medial IPJ of hallux farhad    Interdigital Spaces clean, dry and " without evidence of break in skin integrity   Psychiatric: He has a normal mood and affect. His speech is normal.   Nursing note and vitals reviewed.        Assessment:       Encounter Diagnoses   Name Primary?    Type 2 diabetes, uncontrolled, with peripheral circulatory disorder Yes    Type II diabetes mellitus with neurological manifestations     Onychomycosis due to dermatophyte     Corn or callus     Hammer toes of both feet     Hallux abducto valgus, left     Hallux abducto valgus, right     Hallux abducto valgus, unspecified laterality          Plan:       Percy was seen today for diabetes mellitus, diabetic foot exam and nail care.    Diagnoses and all orders for this visit:    Type 2 diabetes, uncontrolled, with peripheral circulatory disorder    Type II diabetes mellitus with neurological manifestations    Onychomycosis due to dermatophyte    Corn or callus    Hammer toes of both feet    Hallux abducto valgus, left    Hallux abducto valgus, right    Hallux abducto valgus, unspecified laterality      I counseled the patient on his conditions, their implications and medical management.      - Shoe inspection. Diabetic Foot Education. Patient reminded of the importance of good nutrition and blood sugar control to help prevent podiatric complications of diabetes. Patient instructed on proper foot hygeine. We discussed wearing proper shoe gear, daily foot inspections, never walking without protective shoe gear, never putting sharp instruments to feet    - With patient's permission, nails were aggressively reduced and debrided x 10 to their soft tissue attachment mechanically and with electric , removing all offending nail and debris. Patient relates relief following the procedure. Silver nitrate to an abrasions    - After cleansing the  area w/ alcohol prep pad the above mentioned hyperkeratosis was trimmed utilizing No 15 scapel, to a smooth base with out incident. Patient tolerated this  well  and reported comfort to the area of medial hallux IPJ concha    - He will continue to monitor the areas daily, inspect his feet, wear protective shoe gear when ambulatory, moisturizer to maintain skin integrity and follow in this office in approximately 60-70 days, sooner PRN

## 2018-11-20 RX ORDER — DIAZEPAM 2 MG/1
2 TABLET ORAL EVERY 6 HOURS PRN
Qty: 40 TABLET | Refills: 0 | OUTPATIENT
Start: 2018-11-20

## 2018-11-20 NOTE — TELEPHONE ENCOUNTER
----- Message from Eileen Jessica sent at 11/20/2018  8:32 AM CST -----  Contact: Self/[phone]  Refill:  diazepam (VALIUM) 2 MG tablet    [address].  [place]. [address] [phone]

## 2018-11-26 ENCOUNTER — TELEPHONE (OUTPATIENT)
Dept: OTOLARYNGOLOGY | Facility: CLINIC | Age: 78
End: 2018-11-26

## 2018-11-26 ENCOUNTER — TELEPHONE (OUTPATIENT)
Dept: FAMILY MEDICINE | Facility: CLINIC | Age: 78
End: 2018-11-26

## 2018-11-26 RX ORDER — DIAZEPAM 2 MG/1
2 TABLET ORAL EVERY 6 HOURS PRN
Qty: 40 TABLET | Refills: 0 | OUTPATIENT
Start: 2018-11-26

## 2018-11-26 NOTE — TELEPHONE ENCOUNTER
Spoke with patient and he said that he has been having lower back pain to right. Patient said that he contact his Urologist and they will be collecting urine from him on tomorrow.

## 2018-11-26 NOTE — TELEPHONE ENCOUNTER
----- Message from Brandon Julien sent at 11/26/2018 11:23 AM CST -----  Contact: Percy [phone]  The patient is requesting a call back from the staff, in regards to denial of his medication: diazepam. Please call at your earliest convenience.

## 2018-11-26 NOTE — TELEPHONE ENCOUNTER
----- Message from Amy He sent at 11/26/2018 12:31 PM CST -----  Contact: Self/ [phone]  Pt requesting RX for a pain in his side. Please call to advise. Thank you.  .  [facility] [address] [place] [address] ([place] [place] [facility] [address] [facility]  [address] [facility] ([place] [address]  Phone: [phone] Fax: [phone]

## 2018-12-03 ENCOUNTER — CLINICAL SUPPORT (OUTPATIENT)
Dept: AUDIOLOGY | Facility: CLINIC | Age: 78
End: 2018-12-03
Payer: MEDICARE

## 2018-12-03 DIAGNOSIS — H90.3 SENSORINEURAL HEARING LOSS, BILATERAL: Primary | ICD-10-CM

## 2018-12-03 DIAGNOSIS — R42 DIZZINESS AND GIDDINESS: Primary | ICD-10-CM

## 2018-12-03 PROCEDURE — 92550 TYMPANOMETRY & REFLEX THRESH: CPT | Mod: S$GLB,,, | Performed by: AUDIOLOGIST

## 2018-12-03 PROCEDURE — 92540 BASIC VESTIBULAR EVALUATION: CPT | Mod: S$GLB,,, | Performed by: AUDIOLOGIST

## 2018-12-03 PROCEDURE — 92537 CALORIC VSTBLR TEST W/REC: CPT | Mod: S$GLB,,, | Performed by: AUDIOLOGIST

## 2018-12-03 PROCEDURE — 92557 COMPREHENSIVE HEARING TEST: CPT | Mod: S$GLB,,, | Performed by: AUDIOLOGIST

## 2018-12-03 NOTE — PROGRESS NOTES
VIDEONYSTAGMOGRAPHIC REPORT    Patient:  Percy Ramirez    Date: 12/03/2018     *Patient has Macular degeneration and can only see the light from his peripheral vision; therefore, Oculomotor tests are abnormal.   Optokinetic Test:  Symmetric   Gaze Test:  No nystagmus present but patient could not focus on light   Saccades:  Abnormal due to eye condition   Sinusoidal Tracking:  Abnormal due to eye condition   Francie-Hallpike Test:  Negative   Spontaneous Nystagmus:    With Vision:  No nystgamus  Without Vision:  No nystagmus   Positional Test:      Supine:     With Vision:  No nystagmus Without Vision:  No nystagmus    Head Right:     With Vision:  No nystagmus Without Vision: No nystagmus    Head Left:     With Vison:  No nystagmus Without Vision: No nystagmus    Head Hanging:     Without Vision:  No nystagmus   Caloric Test:    Unilateral Weakness:   31% left    Directional Preponderance:  14% right      IMPRESSIONS:     Patient has a 31% left unilateral weakness which is a peripheral finding.  Oculomotor testing abnormal due to patient's Macular Degeneration.  Positional testing within normal limits.  Refer to Dr. Valles.

## 2018-12-03 NOTE — PROGRESS NOTES
Click on link to view complete Audiogram  Document on 12/3/2018 11:18 AM by Portia Molina MA CCC-A: Audiogram    Reason for visit:  Patient seen for Audiogram/VNG today due to dizziness.  Otoscopy:  Small amount of cerumen and clear tympanic membrane AD; clear ear canal and tympanic membrane AS  Tymps:  Type A tymp bilaterally  Acoustic Reflexes:  Present reflexes AD and present and absent reflexes AS  Audio Summary        AD: Moderate to severe SNHL             AS:  Moderate to severe SNHL   Recommendations:  Continue use of hearing aids and refer to Dr. Valles after VNG for medical management of dizziness.

## 2018-12-04 RX ORDER — DIAZEPAM 2 MG/1
2 TABLET ORAL CONTINUOUS PRN
Status: CANCELLED | OUTPATIENT
Start: 2018-12-04

## 2018-12-04 RX ORDER — MECLIZINE HYDROCHLORIDE 25 MG/1
25 TABLET ORAL 3 TIMES DAILY PRN
Qty: 90 TABLET | Refills: 4 | Status: SHIPPED | OUTPATIENT
Start: 2018-12-04 | End: 2018-12-07

## 2018-12-04 NOTE — TELEPHONE ENCOUNTER
Notified patient that his refill for valium 2mg was denied and dr richardson sent in meclizine 25mg into his pharmacy for the dizziness. Also advised patient to keep his follow up appt with dr richardson for nay question he has regarding the vng test or prescription.

## 2018-12-04 NOTE — TELEPHONE ENCOUNTER
just had an inner ear workup here in our office yesterday on 12/03/2018.  His audiogram revealed a moderate sensorineural hearing loss which worsened in the high frequencies.  It was symmetrical.    His VNG was abnormal with a 31% unilateral weakness.  This is a peripheral finding.  His vision is very poor and he could not perform the oculomotor testing for other parts of the VNG.    It is my impression that he does have inner ear vertigo.    He has requested Valium 2 mg.  I am not filling that prescription.  He has a warning in his chart regarding opioids and Valium causes a warning of over sedation and respiratory suppression.    I sent a prescription to his pharmacy for meclizine 25 mg to be taken 3 times a day as needed for vertigo symptoms.  The prescription was for 90 with 4 refills.    He has had the symptoms for a long time.

## 2018-12-04 NOTE — TELEPHONE ENCOUNTER
Patient is requesting a refill for valium 2mg faxed from the pharmacy. Patient also had audio/vng done with the audiologist on 12/3/18. Has follow up appt scheduled for 12/7/18 with dr richardson.

## 2018-12-07 ENCOUNTER — OFFICE VISIT (OUTPATIENT)
Dept: OTOLARYNGOLOGY | Facility: CLINIC | Age: 78
End: 2018-12-07
Payer: MEDICARE

## 2018-12-07 ENCOUNTER — TELEPHONE (OUTPATIENT)
Dept: FAMILY MEDICINE | Facility: CLINIC | Age: 78
End: 2018-12-07

## 2018-12-07 VITALS
BODY MASS INDEX: 30.7 KG/M2 | HEIGHT: 66 IN | SYSTOLIC BLOOD PRESSURE: 126 MMHG | WEIGHT: 191 LBS | DIASTOLIC BLOOD PRESSURE: 62 MMHG

## 2018-12-07 DIAGNOSIS — H69.01 PATULOUS EUSTACHIAN TUBE OF RIGHT EAR: ICD-10-CM

## 2018-12-07 DIAGNOSIS — G47.33 OBSTRUCTIVE SLEEP APNEA ON CPAP: Primary | ICD-10-CM

## 2018-12-07 DIAGNOSIS — H90.3 SENSORINEURAL HEARING LOSS (SNHL) OF BOTH EARS: ICD-10-CM

## 2018-12-07 DIAGNOSIS — H81.392 PERIPHERAL VERTIGO INVOLVING LEFT EAR: Primary | ICD-10-CM

## 2018-12-07 DIAGNOSIS — G47.33 OBSTRUCTIVE SLEEP APNEA ON CPAP: ICD-10-CM

## 2018-12-07 PROCEDURE — 1101F PT FALLS ASSESS-DOCD LE1/YR: CPT | Mod: CPTII,S$GLB,, | Performed by: OTOLARYNGOLOGY

## 2018-12-07 PROCEDURE — 3078F DIAST BP <80 MM HG: CPT | Mod: CPTII,S$GLB,, | Performed by: OTOLARYNGOLOGY

## 2018-12-07 PROCEDURE — 3074F SYST BP LT 130 MM HG: CPT | Mod: CPTII,S$GLB,, | Performed by: OTOLARYNGOLOGY

## 2018-12-07 PROCEDURE — 99213 OFFICE O/P EST LOW 20 MIN: CPT | Mod: S$GLB,,, | Performed by: OTOLARYNGOLOGY

## 2018-12-07 RX ORDER — MECLIZINE HCL 12.5 MG 12.5 MG/1
12.5 TABLET ORAL 3 TIMES DAILY PRN
Qty: 45 TABLET | Refills: 4 | Status: SHIPPED | OUTPATIENT
Start: 2018-12-07 | End: 2019-03-15 | Stop reason: SDUPTHER

## 2018-12-07 NOTE — TELEPHONE ENCOUNTER
----- Message from Raven Corey sent at 12/6/2018  4:20 PM CST -----  Contact: peoples health insurance- [employer]   Calling in ref to a non disposable filter for c-pap machine. Asking for an order. Please call at [phone].

## 2018-12-07 NOTE — PATIENT INSTRUCTIONS
I ordered meclizine which is Antivert which is the same medicine you have for dizziness.  I reduced the dose from 25 mg to 12-,1/2 mg.  This will still help you with dizziness but should not make you so sleepy.  You can take 1 every 4-6 hours as needed for dizziness.  Do not take them if you are not dizzy.

## 2018-12-07 NOTE — PROGRESS NOTES
History of Present Illness:  Percy Ramirez presents to the clinic today for Follow-up (VNG RESULTS)  .  Mr. Ramirez has been a long-time patient here since 09/23/2009.  He has suffered with sensorineural hearing loss and vertigo of inner ear etiology for all that time. He did well on meclizine initially but ceased to do well with it.  He was treated for years with Valium 2 mg q.6h p.r.n. vertigo with success.  He is still having the same dizziness and call the office for refill on Valium.  It was refused due to its sedative affects and ability for habituation.    He was scheduled to come back in for a current VNG test.  It was done and the results were abnormal.  He has a 31% left unilateral weakness which is a peripheral vestibular pathologic finding.  Most of the rest of the VNG test could not be done because he has macular degeneration and his eye movements are abnormal because of that.    I discussed his test results with him in detail and went over again with the reasons why we cannot refill his Valium.  He is complaining of over sedation from meclizine.  I explained to him that I want to reduce the dose to 12-,1/2 mg and he will be able to take that every 4-6 hours as needed for dizziness.  He has a history of daytime somnolence from his sleep apnea.  Valium was not ideal to say the least with his sleep apnea.  Meclizine in a 12 a 0.5 mg dose should be less sedating and safer.  I explained that to him.    Past Medical History:   Diagnosis Date    Allergy     Arthritis     CAD, multiple vessel     DR Melissa    Cataract     Depression     Hyperlipidemia     Hypertension     Macular degeneration     Dr Razo for injection, Jennifer for eye    S/P CABG x 2     Type 2 diabetes mellitus with circulatory disorder     Dr. Aquino     Past Surgical History:   Procedure Laterality Date    broken elbow      left    CARPAL METACARPAL INTERPOSITIONAL ARTHROPLASTY-THUMB Right 11/22/2017    Performed by Malissa  MORRIS Perez III, MD at [facility] OR    COLONOSCOPY N/A 10/4/2017    Procedure: COLONOSCOPY;  Surgeon: Gabriel Leung MD;  Location: [facility] ENDO;  Service: Endoscopy;  Laterality: N/A;    COLONOSCOPY N/A 10/4/2017    Performed by Gabriel Leung MD at [facility] ENDO    EYE SURGERY      cataract    heart bypass      2004    HERNIA REPAIR      right    RECONSTRUCTION-TENDON-FLEXOR CARPI RADIAS Right 2017    Performed by Malissa Perez III, [facility] at [facility] OR    ROTATOR CUFF REPAIR      right  2004     Family History   Problem Relation Age of Onset    Arthritis Mother     Diabetes Mother     Stroke Mother     Heart attack Father     Cancer Brother     Throat cancer Brother     Diabetes Maternal Aunt     Diabetes Maternal Uncle     Heart disease Maternal Uncle     Diabetes Paternal Aunt     Heart disease Paternal Aunt     Heart disease Paternal Uncle     Heart disease Maternal Grandmother     Cancer Maternal Grandfather        Social History     Tobacco Use    Smoking status: Former Smoker     Packs/day: 3.00     Years: 45.00     Pack years: 135.00     Types: Cigarettes     Last attempt to quit: 2004     Years since quittin.6    Smokeless tobacco: Never Used   Substance Use Topics    Alcohol use: Yes     Comment: was heavy drinker 35 years ago, rare use now    Drug use: No           REVIEW OF SYSTEMS:    General - the patient denies fevers, chills, night sweats, and weight loss   Ears     - the patient has tinnitus and chronic hearing loss.               - denies infection, pressure, drainage, congestion                -  has chronic dizziness and has had abnormal VNG is before here (see PI)  He also has had history of use eustachian tube dysfunction along with his sensorineural hearing loss.  He has had a right PE tube placed on 2015 and again on 2016.  Nose    - denies previous nasal surgery      - denies previous sinus surgery      - denies nasal obstruction      - denies  post nasal drip      - denies snoring      - denies loss of smell  Throat    - denies throat pain      - denies hoarseness  Neck    - denies neck mass  Eyes       - the patient has diabetic retinopathy and macular degeneration.    Cardiovascular -he has hypertension and coronary artery disease. With CABG x2   Endocrine - the patient denies heat/cold intolerance, excessive thirst      -he is diabetic      - denies thyroid problems    Gastrointestinal - the patient denies abdominal pain, nausea, vomiting, change in bowel         movements      - denies GERD      - denies problems with swallowing      - denies pain on swallowing  Genitourinary - the patient denies dysuria and hematuria      - denies CKD      - denies prostate problems (males only)  Heme/Lymph - the patient denies easy bruising, bleeding disorder      - denies anemia       -denies using blood thinner medication other than ASA      - denies swollen glands in the axilla or groin  Musculoskeletal - the patient denies muscle weakness      - denies arthritis   Neuro     - the patient denies unexplained weakness or slurred speech      - denies focal neurologic symptoms       - denies seizures  Psych    - the patient denies anxiety and depression   Respiratory - the patient denies cough and shortness of breath   - denies asthma   - denies COPD   -has obstructive sleep apnea and is on CPAP   Skin - the patient denies new skin lesions, changes in moles, and rashes       Physical Exam:    Gen    - he is  well-nourished well-developed, obese, and in no acute distress.   Voice - clear, speech intelligible   Head  - normocephalic without evidence of trauma, face symmetric with good tone   Salivary glands             - Parotid glands : no asymmetry, no lesions or masses    - Submaxillary (submandibular) glands: no asymmetry, no lesions or masses  Skin   - no rashes or lesions of the skin of the head and neck   Ears   - both tympanic membranes, external canals, and  auricles are normal;                Hearing is grossly intact.   Nose  - there is no external deformity. There is no rhinorrhea. Septum is midline.               The inferior turbinates are unremarkable. The mucosa is healthy.              No polyps were noted.  Oral cavity    - there are no lesions of the lips, nor of the buccal, lingual, gingival, or               palatal mucosal surfaces. The dentition is adequate.   Oropharynx   - the tonsils are symmetric. The posterior oropharyngeal wall is clear.               The base of tongue has no lesions.    Neck  - palpation of the neck reveals no lymphadenopathy or masses.               The thyroid is unremarkable. There are no incisions.               No supraclavicular lymphadenopathy.   Lungs -Chest rise is symmetric without use of accessory muscles.                There is no stridor or congestion.  CV       - regular rate and rhythm.    Abdomen       - Nondistended.  Extremities    - warm and well-perfused with no cyanosis clubbing or edema   Neuro  - CN II-XII are intact and symmetric. Gait is normal. The patient is AAO x 3,                with a calm affect.         Assessment:   Percy was seen today for follow-up.    Diagnoses and all orders for this visit:    Peripheral vertigo involving left ear  -     meclizine (ANTIVERT) 12.5 mg tablet; Take 1 tablet (12.5 mg total) by mouth 3 (three) times daily as needed for Dizziness.    Obstructive sleep apnea on CPAP    Sensorineural hearing loss (SNHL) of both ears    Patulous eustachian tube of right ear          Plan:   he has ELINA with CPAP and daytime somnolence.  He is simply not a candidate for Valium in my opinion.  I reduced his meclizine from 25 mg to 12-,1/2 mg having him take it p.r.n. with hopefully a lower cumulative amount and less somnolence from the medication.    Follow-up if symptoms worsen or fail to improve.

## 2018-12-14 ENCOUNTER — TELEPHONE (OUTPATIENT)
Dept: FAMILY MEDICINE | Facility: CLINIC | Age: 78
End: 2018-12-14

## 2018-12-14 NOTE — TELEPHONE ENCOUNTER
----- Message from Amy He sent at 12/14/2018  2:28 PM CST -----  Contact: Self/ [phone]  Pt requesting call back from office. Wants a refill on Valium. Says when he takes Meclizine, it makes him extremely sleepy. Please call to advise. Thank you.  .  [facility] [address] [place] [address] ([place] [place] [facility] [address] [facility]  [address] [facility] ([place] [address]  Phone: [phone] Fax: [phone]

## 2018-12-17 LAB
CHOL/HDLC RATIO: 3.9
CHOLESTEROL, TOTAL: 108
HBA1C MFR BLD: 7.6 % (ref 4–5.6)
HDLC SERPL-MCNC: 28 MG/DL
LDLC SERPL CALC-MCNC: 58 MG/DL
NONHDLC SERPL-MCNC: 80 MG/DL
TRIGL SERPL-MCNC: 138 MG/DL

## 2018-12-18 NOTE — TELEPHONE ENCOUNTER
Please see my chart note of 12/07/2018.    I did not refill his Valium and I do not feel that it is appropriate for him to take it or for me to refill it.  The chart note is self explanatory.    I have discussed this with Dr. Edmondson, his PCP.

## 2018-12-20 ENCOUNTER — TELEPHONE (OUTPATIENT)
Dept: OTOLARYNGOLOGY | Facility: CLINIC | Age: 78
End: 2018-12-20

## 2018-12-20 NOTE — TELEPHONE ENCOUNTER
----- Message from Renay Martinez sent at 12/20/2018  9:14 AM CST -----  Contact: Self  Pt is calling to speak with staff regarding medication Dr Valles put him on. Please call pt at [phone].

## 2018-12-20 NOTE — TELEPHONE ENCOUNTER
Spoke with patient and informed him that Dr. Valles said to only take 12 1/2 mg instead of 25 mg.

## 2019-01-03 ENCOUNTER — TELEPHONE (OUTPATIENT)
Dept: FAMILY MEDICINE | Facility: CLINIC | Age: 79
End: 2019-01-03

## 2019-01-03 DIAGNOSIS — G47.33 OBSTRUCTIVE SLEEP APNEA ON CPAP: Primary | ICD-10-CM

## 2019-01-03 NOTE — TELEPHONE ENCOUNTER
----- Message from Irina Nunez sent at 1/3/2019  1:53 PM CST -----  Contact: ALIX_ Cristiano  Need orders for  non disposable filters for Cpap. Please call patient at [phone] fax: [phone]

## 2019-01-08 ENCOUNTER — TELEPHONE (OUTPATIENT)
Dept: FAMILY MEDICINE | Facility: CLINIC | Age: 79
End: 2019-01-08

## 2019-01-08 DIAGNOSIS — G47.33 OBSTRUCTIVE SLEEP APNEA ON CPAP: Primary | ICD-10-CM

## 2019-01-08 NOTE — TELEPHONE ENCOUNTER
----- Message from Janeth Dominguez sent at 1/7/2019  1:17 PM CST -----  Contact: self/ [phone]  Patient need a hard filter for his c-pap machine. Please call honey.

## 2019-01-09 ENCOUNTER — TELEPHONE (OUTPATIENT)
Dept: FAMILY MEDICINE | Facility: CLINIC | Age: 79
End: 2019-01-09

## 2019-01-09 NOTE — TELEPHONE ENCOUNTER
Patient is requesting discontinue of oxygen tank and oxygen concentrator. He said that he check his oxygen sat it's running 95%. He said that he spoke to the DME company and they told him that they will need a letter from provider to discontinue the oxygen.

## 2019-01-09 NOTE — TELEPHONE ENCOUNTER
----- Message from Brandon Julien sent at 1/9/2019  1:53 PM CST -----  Contact: Percy [phone]  The patient is requesting a call back from the staff. He reports that he has oxygen medical equipment that he does not need. Please call at your earliest convenience.

## 2019-01-09 NOTE — TELEPHONE ENCOUNTER
How does he check his oxygen saturation? He has his own meter?  Is he measuring this at rest or when he is walking?    If only at rest - would recommend he check himself when walking to see if saturation on room air goes down below 88%

## 2019-01-10 NOTE — TELEPHONE ENCOUNTER
Spoke with patient and he does have his on pulse ox. Had patient take his pulse ox at rest and it was 96%.  Patient informed me that the distance for his front door to kitchen is 20 ft. Patient walked distance for 1 min at sat reading while walking was 92-93%.

## 2019-01-14 ENCOUNTER — OFFICE VISIT (OUTPATIENT)
Dept: PODIATRY | Facility: CLINIC | Age: 79
End: 2019-01-14
Payer: MEDICARE

## 2019-01-14 VITALS — BODY MASS INDEX: 30.7 KG/M2 | WEIGHT: 191 LBS | HEIGHT: 66 IN

## 2019-01-14 DIAGNOSIS — L84 CORN OR CALLUS: ICD-10-CM

## 2019-01-14 DIAGNOSIS — M20.42 HAMMER TOES OF BOTH FEET: ICD-10-CM

## 2019-01-14 DIAGNOSIS — M20.10 HALLUX ABDUCTO VALGUS, UNSPECIFIED LATERALITY: ICD-10-CM

## 2019-01-14 DIAGNOSIS — E11.49 TYPE II DIABETES MELLITUS WITH NEUROLOGICAL MANIFESTATIONS: ICD-10-CM

## 2019-01-14 DIAGNOSIS — M20.41 HAMMER TOES OF BOTH FEET: ICD-10-CM

## 2019-01-14 DIAGNOSIS — M20.12 HALLUX ABDUCTO VALGUS, LEFT: ICD-10-CM

## 2019-01-14 DIAGNOSIS — M20.11 HALLUX ABDUCTO VALGUS, RIGHT: ICD-10-CM

## 2019-01-14 DIAGNOSIS — B35.1 ONYCHOMYCOSIS DUE TO DERMATOPHYTE: ICD-10-CM

## 2019-01-14 PROCEDURE — 11056 PR TRIM BENIGN HYPERKERATOTIC SKIN LESION,2-4: ICD-10-PCS | Mod: Q9,S$GLB,, | Performed by: PODIATRIST

## 2019-01-14 PROCEDURE — 99999 PR PBB SHADOW E&M-EST. PATIENT-LVL III: CPT | Mod: PBBFAC,,, | Performed by: PODIATRIST

## 2019-01-14 PROCEDURE — 11721 DEBRIDE NAIL 6 OR MORE: CPT | Mod: 59,Q9,S$GLB, | Performed by: PODIATRIST

## 2019-01-14 PROCEDURE — 99999 PR PBB SHADOW E&M-EST. PATIENT-LVL III: ICD-10-PCS | Mod: PBBFAC,,, | Performed by: PODIATRIST

## 2019-01-14 PROCEDURE — 11721 PR DEBRIDEMENT OF NAILS, 6 OR MORE: ICD-10-PCS | Mod: 59,Q9,S$GLB, | Performed by: PODIATRIST

## 2019-01-14 PROCEDURE — 11056 PARNG/CUTG B9 HYPRKR LES 2-4: CPT | Mod: Q9,S$GLB,, | Performed by: PODIATRIST

## 2019-01-14 PROCEDURE — 99499 UNLISTED E&M SERVICE: CPT | Mod: S$GLB,,, | Performed by: PODIATRIST

## 2019-01-14 PROCEDURE — 99499 RISK ADDL DX/OHS AUDIT: ICD-10-PCS | Mod: S$GLB,,, | Performed by: PODIATRIST

## 2019-01-14 NOTE — PATIENT INSTRUCTIONS
Recommend lotions: eucerin, eucerin for diabetics, aquaphor, A&D ointment, gold bond for diabetics, sween, [place]'s Bees all purpose baby ointment,  urea 40 with aloe (found on amazon.com)    Shoe recommendations: (try 6pm.com, zappos.Nemedia , nordstromrack.Nemedia, or shoes.Nemedia for discounted prices) you can visit [employer]SW shoes in [place]  or [employer] in the [facility] (there are also several shoe brand outlets in the [facility])    Asics (GT 2000 or gel foundations), new balance stability type shoes, saucony (stabil c3),  Mahoney (GTS or Beast or transcend), propet (tennis shoe)    Sofoskar Sousa (women) Gustabo&Billy (men), clarks, crocs, aerosoles, naturalizers, SAS, ecco, born, ngoc lara, rockports (dress shoes)    Vionic, burkenstocks, fitflops, propet (sandals)  Nike comfort thong sandals, crocs, propet (house shoes)    Nail Home remedy:  Vicks Vapor rub to nails for easier manageability    Occasional soaks for 15-20 mins in luke warm water with 1/2 cup of listerine and 1 cup of apple cider vinegar are ok You may add several drops of oil of oregano or tea tree oil as well      Diabetes: Inspecting Your Feet  Diabetes increases your chances of developing foot problems. So inspect your feet every day. This helps you find small skin irritations before they become serious infections. If you have trouble seeing the bottoms of your feet, use a mirror or ask a family member or friend to help.     Pressure spots on the bottom of the foot are common areas where problems develop.   How to check your feet  Below are tips to help you look for foot problems. Try to check your feet at the same time each day, such as when you get out of bed in the morning:  · Check the top of each foot. The tops of toes, back of the heel, and outer edge of the foot can get a lot of rubbing from poor-fitting shoes.  · Check the bottom of each foot. Daily wear and tear often leads to problems at pressure spots.  · Check the toes and nails. Fungal  infections often occur between toes. Toenail problems can also be a sign of fungal infections or lead to breaks in the skin.  · Check your shoes, too. Loose objects inside a shoe can injure the foot. Use your hand to feel inside your shoes for things like alfredo, loose stitching, or rough areas that could irritate your skin.  Warning signs  Look for any color changes in the foot. Redness with streaks can signal a severe infection, which needs immediate medical attention. Tell your doctor right away if you have any of these problems:  · Swelling, sometimes with color changes, may be a sign of poor blood flow or infection. Symptoms include tenderness and an increase in the size of your foot.  · Warm or hot areas on your feet may be signs of infection. A foot that is cold may not be getting enough blood.  · Sensations such as burning, tingling, or pins and needles can be signs of a problem. Also check for areas that may be numb.  · Hot spots are caused by friction or pressure. Look for hot spots in areas that get a lot of rubbing. Hot spots can turn into blisters, calluses, or sores.  · Cracks and sores are caused by dry or irritated skin. They are a sign that the skin is breaking down, which can lead to infection.  · Toenail problems to watch for include nails growing into the skin (ingrown toenail) and causing redness or pain. Thick, yellow, or discolored nails can signal a fungal infection.  · Drainage and odor can develop from untreated sores and ulcers. Call your doctor right away if you notice white or yellow drainage, bleeding, or unpleasant odor.   © 9310-3421 [employer]. [address] [place] [address]. All rights reserved. This information is not intended as a substitute for professional medical care. Always follow your healthcare professional's instructions.        Step-by-Step:  Inspecting Your Feet (Diabetes)    Date Last Reviewed: 10/1/2016  © 2677-9911 [employer] [address] [place] [address]. All rights reserved. This information is not intended as a substitute for professional medical care. Always follow your healthcare professional's instructions.

## 2019-01-14 NOTE — PROGRESS NOTES
Subjective:      Patient ID: Percy Ramirez is a 78 y.o. male.    Chief Complaint: Diabetic Foot Exam (Pcp Dr. Edmondson 8/10/18); Diabetes Mellitus; and Nail Care    Percy is a 78 y.o. male who presents to the clinic for evaluation and treatment of high risk feet. Percy has a past medical history of Allergy, Arthritis, CAD, multiple vessel, Cataract, Depression, Hyperlipidemia, Hypertension, Macular degeneration, S/P CABG x 2, and Type 2 diabetes mellitus with circulatory disorder. The patient's chief complaint is elongated, thickened toenails aggravated by increased weight bearing, shoe gear, pressure.    This patient has documented high risk feet requiring routine maintenance secondary to diabetes mellitis and those secondary complications of diabetes, as mentioned..    PCP: Kasie Edmondson MD    Date Last Seen by PCP:   Chief Complaint   Patient presents with    Diabetic Foot Exam     Pcp Dr. Edmondson 8/10/18    Diabetes Mellitus    Nail Care     Current shoe gear:  rx shoes    Hemoglobin A1C   Date Value Ref Range Status   08/08/2018 7.2 (H) 4.0 - 5.6 % Final     Comment:     ADA Screening Guidelines:  5.7-6.4%  Consistent with prediabetes  >or=6.5%  Consistent with diabetes  High levels of fetal hemoglobin interfere with the HbA1C  assay. Heterozygous hemoglobin variants (HbS, HgC, etc)do  not significantly interfere with this assay.   However, presence of multiple variants may affect accuracy.     01/23/2018 6.9 (H) 4.0 - 5.6 % Final     Comment:     Dr. Philippe Aquino(Endocrinology)   10/22/2013 7.4 (H) 4.5 - 6.2 % Final     Patient Active Problem List   Diagnosis    Major depressive disorder, recurrent episode, mild    Hypertension    Type 2 diabetes, uncontrolled, with retinopathy    CAD, multiple vessel    S/P CABG x 2    Macular degeneration    Obstructive sleep apnea on CPAP    Anxiety state, unspecified    Insulin long-term use    Primary osteoarthritis of both knees    Chronic low  "back pain    DJD (degenerative joint disease), lumbar    Type 2 diabetes, uncontrolled, with neuropathy    Bilateral carotid artery disease    Hyperlipidemia LDL goal <100    Type 2 diabetes, uncontrolled, with peripheral circulatory disorder    COPD (chronic obstructive pulmonary disease)    Atherosclerosis of abdominal aorta    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 18 g 0    BD INSULIN PEN NEEDLE UF SHORT 31 gauge x 5/16" Ndle       BD INSULIN SYRINGE ULTRA-FINE 1/2 mL 31 gauge x 15/64" Syrg       carvedilol (COREG) 25 MG tablet TAKE 1 TABLET BY MOUTH TWICE DAILY 180 tablet 1    cinnamon bark 500 mg capsule Take 1,000 mg by mouth once daily.        clopidogrel (PLAVIX) 75 mg tablet Take 75 mg by mouth once daily.      DULoxetine (CYMBALTA) 30 MG capsule Take 1 capsule (30 mg total) by mouth once daily. 90 capsule 0    furosemide (LASIX) 40 MG tablet Take 40 mg by mouth once daily.       gabapentin (NEURONTIN) 300 MG capsule TAKE 2 CAPSULES BY MOUTH 3 TIMES DAILY 540 capsule 1    glimepiride (AMARYL) 4 MG tablet Take 4 mg by mouth daily with breakfast.       insulin NPH-insulin regular, 70/30, (NOVOLIN 70/30) 100 unit/mL (70-30) injection Inject into the skin. Inject 5 units at breakfast and inject 10 units at night      insulin syringe-needle U-100 1/2 mL 31 x 5/16" Syrg       losartan (COZAAR) 100 MG tablet TAKE 1 TABLET BY MOUTH ONCE DAILY 90 tablet 1    metformin (GLUCOPHAGE) 500 MG tablet Take " 1,000 mg by mouth 2 (two) times daily with meals. 2 Tablets in the morning, 2 Tablets in the evening      nitroGLYCERIN (NITROSTAT) 0.3 MG SL tablet PLACE 1 TABLET UNDER TONGUE AS NEEDED FOR CHEST PAIN EVERY 5 MINUTES, UP TO 3 DOSES IN 15MINUTES  2    OM-3/DHA/EPA/FISH OIL/VIT D3 (FISH OIL-VIT D3 ORAL) Take 2,000 Units by mouth once daily.       pravastatin (PRAVACHOL) 20 MG tablet Take 20 mg by mouth once daily.      rivastigmine tartrate (EXELON) 1.5 MG capsule Take 1 capsule (1.5 mg total) by mouth 2 (two) times daily. 60 capsule 11    spironolactone (ALDACTONE) 25 MG tablet 25 mg once daily.       VIT C/VIT E AC/LUT/COPPER/ZINC (PRESERVISION LUTEIN ORAL) Take by mouth.      warfarin (COUMADIN) 5 MG tablet Take 2 tabs by mouth on Tuesday,Thursday, Saturday and Sundays then 1.5 on all other days.      meclizine (ANTIVERT) 12.5 mg tablet Take 1 tablet (12.5 mg total) by mouth 3 (three) times daily as needed for Dizziness. 45 tablet 4    [DISCONTINUED] diazepam (VALIUM) 2 MG tablet Take 2 mg by mouth continuous prn.       No current facility-administered medications on file prior to visit.      Review of patient's allergies indicates:   Allergen Reactions    Codeine Nausea Only     weak     Past Surgical History:   Procedure Laterality Date    broken elbow      left    CARPAL METACARPAL INTERPOSITIONAL ARTHROPLASTY-THUMB Right 11/22/2017    Performed by [facility] at [facility] OR    COLONOSCOPY N/A 10/4/2017    Performed by Gabriel Leung [facility] at [facility] ENDO    EYE SURGERY      cataract    heart bypass      2004    HERNIA REPAIR      right    RECONSTRUCTION-TENDON-FLEXOR CARPI RADIAS Right 11/22/2017    Performed [facility] at [facility] OR    ROTATOR CUFF REPAIR      right  2004     Family History   Problem Relation Age of Onset    Arthritis Mother     Diabetes Mother     Stroke Mother     Heart attack Father     Cancer Brother     Throat cancer Brother     Diabetes  Maternal Aunt     Diabetes Maternal Uncle     Heart disease Maternal Uncle     Diabetes Paternal Aunt     Heart disease Paternal Aunt     Heart disease Paternal Uncle     Heart disease Maternal Grandmother     Cancer Maternal Grandfather      Social History     Socioeconomic History    Marital status:      Spouse name: Not on file    Number of children: 4    Years of education: GED    Highest education level: Not on file   Social Needs    Financial resource strain: Not on file    Food insecurity - worry: Not on file    Food insecurity - inability: Not on file    Transportation needs - medical: Not on file    Transportation needs - non-medical: Not on file   Occupational History    Occupation: retired tug    Tobacco Use    Smoking status: Former Smoker     Packs/day: 3.00     Years: 45.00     Pack years: 135.00     Types: Cigarettes     Last attempt to quit: 2004     Years since quittin.7    Smokeless tobacco: Never Used   Substance and Sexual Activity    Alcohol use: Yes     Comment: was heavy drinker 35 years ago, rare use now    Drug use: No    Sexual activity: Yes     Partners: Female   Other Topics Concern    Not on file   Social History Narrative    Not on file     Review of Systems   Constitution: Negative for chills, fever and weakness.   Cardiovascular: Negative for claudication and leg swelling.   Respiratory: Positive for cough. Negative for shortness of breath.    Skin: Positive for dry skin and nail changes. Negative for itching and rash.   Musculoskeletal: Positive for arthritis, back pain and joint pain. Negative for falls, joint swelling and muscle weakness.   Gastrointestinal: Negative for diarrhea, nausea and vomiting.   Neurological: Positive for numbness and paresthesias. Negative for tremors.   Psychiatric/Behavioral: Negative for altered mental status and hallucinations.           Objective:       Vitals:    19 1009   Weight: 86.6 kg (191  "lb)   Height: 5' 6" (1.676 m)   PainSc: 0-No pain       Physical Exam   Constitutional:   General: Pt. is well-developed, well-nourished, appears stated age, in no acute distress, alert and oriented x 3. No evidence of depression, anxiety, or agitation. Calm, cooperative, and communicative. Appropriate interactions and affect.       Cardiovascular:   Pulses:       Dorsalis pedis pulses are 2+ on the right side, and 2+ on the left side.        Posterior tibial pulses are 1+ on the right side, and 1+ on the left side.   There is decreased digital hair.   Musculoskeletal:        Right foot: There is normal range of motion.        Left foot:  There is normal range of motion.   Muscle strength is 5/5 in all groups bilaterally.    Decreased stride, station of gait.  apropulsive toe off.  Increased angle and base of gait.    Patient has hammertoes of digits 2-5 bilateral partially reducible without symptom today.    Visible and palpable bunion without pain at dorsomedial 1st metatarsal head right and left.  Hallux abducted right and left partially reducible, tracks laterally without being track bound.  No ecchymosis, erythema, edema, or cardinal signs infection or signs of trauma same foot.     Neurological: A sensory deficit is present.   [place] 5.07 monofilamant testing is diminished Farhad feet. Sharp/dull sensation diminished Bilaterally   Skin: Skin is warm, dry and intact. No abrasion, no ecchymosis, no lesion and no rash noted. He is not diaphoretic. No cyanosis or erythema. No pallor. Nails show no clubbing.   Toenails 1-5 bilaterally are elongated by 2-3 mm, thickened by 2-3 mm, discolored/yellowed, dystrophic, brittle with subungual debris.    Focal hyperkeratotic lesion consisting entirely of hyperkeratotic tissue without open skin, drainage, pus, fluctuance, malodor, or signs of infection: medial IPJ of hallux farhad    Interdigital Spaces clean, dry and without evidence of break in skin integrity "   Psychiatric: He has a normal mood and affect. His speech is normal.   Nursing note and vitals reviewed.        Assessment:       Encounter Diagnoses   Name Primary?    Type 2 diabetes, uncontrolled, with peripheral circulatory disorder Yes    Type II diabetes mellitus with neurological manifestations     Onychomycosis due to dermatophyte     Corn or callus     Hammer toes of both feet     Hallux abducto valgus, left     Hallux abducto valgus, right     Hallux abducto valgus, unspecified laterality          Plan:       Percy was seen today for diabetic foot exam, diabetes mellitus and nail care.    Diagnoses and all orders for this visit:    Type 2 diabetes, uncontrolled, with peripheral circulatory disorder    Type II diabetes mellitus with neurological manifestations    Onychomycosis due to dermatophyte    Corn or callus    Hammer toes of both feet    Hallux abducto valgus, left    Hallux abducto valgus, right    Hallux abducto valgus, unspecified laterality      I counseled the patient on his conditions, their implications and medical management.      - Shoe inspection. Diabetic Foot Education. Patient reminded of the importance of good nutrition and blood sugar control to help prevent podiatric complications of diabetes. Patient instructed on proper foot hygeine. We discussed wearing proper shoe gear, daily foot inspections, never walking without protective shoe gear, never putting sharp instruments to feet    - With patient's permission, nails were aggressively reduced and debrided x 10 to their soft tissue attachment mechanically and with electric , removing all offending nail and debris. Patient relates relief following the procedure. Silver nitrate to an abrasions    - After cleansing the  area w/ alcohol prep pad the above mentioned hyperkeratosis was trimmed utilizing No 15 scapel, to a smooth base with out incident. Patient tolerated this  well and reported comfort to the area of medial  hallux IPJ concha    - He will continue to monitor the areas daily, inspect his feet, wear protective shoe gear when ambulatory, moisturizer to maintain skin integrity and follow in this office in approximately 60-70 days, sooner PRN

## 2019-01-22 ENCOUNTER — TELEPHONE (OUTPATIENT)
Dept: OTOLARYNGOLOGY | Facility: CLINIC | Age: 79
End: 2019-01-22

## 2019-01-22 NOTE — TELEPHONE ENCOUNTER
----- Message from Rachael Vicente sent at 1/22/2019  9:17 AM CST -----  Contact: Self  Patient called to request medication refill. Please call to advise at [phone]        diazepam (VALIUM) 2 MG tablet             [facility] [address] [place], [place] [address], [place] - [phone] [facility]

## 2019-01-22 NOTE — TELEPHONE ENCOUNTER
Pt. Notified that  is refusing to refill the Valium and he has the see his PCP if he wants Valium.

## 2019-01-28 ENCOUNTER — PATIENT OUTREACH (OUTPATIENT)
Dept: ADMINISTRATIVE | Facility: HOSPITAL | Age: 79
End: 2019-01-28

## 2019-01-28 DIAGNOSIS — E11.9 DIABETES MELLITUS WITHOUT COMPLICATION: Primary | ICD-10-CM

## 2019-01-28 RX ORDER — AMOXICILLIN AND CLAVULANATE POTASSIUM 875; 125 MG/1; MG/1
TABLET, FILM COATED ORAL
COMMUNITY
Start: 2018-11-28 | End: 2019-02-11

## 2019-02-05 DIAGNOSIS — F33.0 MAJOR DEPRESSIVE DISORDER, RECURRENT EPISODE, MILD: ICD-10-CM

## 2019-02-06 ENCOUNTER — TELEPHONE (OUTPATIENT)
Dept: PODIATRY | Facility: CLINIC | Age: 79
End: 2019-02-06

## 2019-02-06 RX ORDER — DULOXETIN HYDROCHLORIDE 30 MG/1
CAPSULE, DELAYED RELEASE ORAL
Qty: 90 CAPSULE | Refills: 0 | Status: SHIPPED | OUTPATIENT
Start: 2019-02-06 | End: 2019-02-06 | Stop reason: SDUPTHER

## 2019-02-06 RX ORDER — DULOXETIN HYDROCHLORIDE 30 MG/1
30 CAPSULE, DELAYED RELEASE ORAL DAILY
Qty: 90 CAPSULE | Refills: 0 | Status: SHIPPED | OUTPATIENT
Start: 2019-02-06 | End: 2019-03-13 | Stop reason: SDUPTHER

## 2019-02-06 NOTE — TELEPHONE ENCOUNTER
----- Message from Jennifer Thomas sent at 2/6/2019  4:23 PM CST -----  Contact: self - [phone]  Pt asking for a call back from nurse regarding using [employer] Tree Oil for toenail, he states he heard this on TV and would like to know if she should use it.

## 2019-02-06 NOTE — TELEPHONE ENCOUNTER
----- Message from Irina Nunez sent at 2/6/2019 10:00 AM CST -----  Contact: Self   Patient need a refill on his medication. Please call patient at [phone].      DULoxetine (CYMBALTA) 30 MG capsule      [facility] - [place] ([place], [place] - [phone] [facility]

## 2019-02-06 NOTE — TELEPHONE ENCOUNTER
pt told me he already bought tea tree oil and was going to give it a try. Also was told to use recommend creams from [facility].

## 2019-02-11 ENCOUNTER — LAB VISIT (OUTPATIENT)
Dept: LAB | Facility: HOSPITAL | Age: 79
End: 2019-02-11
Attending: INTERNAL MEDICINE
Payer: MEDICARE

## 2019-02-11 ENCOUNTER — OFFICE VISIT (OUTPATIENT)
Dept: FAMILY MEDICINE | Facility: CLINIC | Age: 79
End: 2019-02-11
Payer: MEDICARE

## 2019-02-11 VITALS
OXYGEN SATURATION: 91 % | HEART RATE: 68 BPM | SYSTOLIC BLOOD PRESSURE: 138 MMHG | HEIGHT: 66 IN | BODY MASS INDEX: 30.53 KG/M2 | WEIGHT: 189.94 LBS | TEMPERATURE: 98 F | DIASTOLIC BLOOD PRESSURE: 68 MMHG

## 2019-02-11 DIAGNOSIS — E78.5 HYPERLIPIDEMIA LDL GOAL <100: ICD-10-CM

## 2019-02-11 DIAGNOSIS — G47.33 OBSTRUCTIVE SLEEP APNEA ON CPAP: ICD-10-CM

## 2019-02-11 DIAGNOSIS — I48.0 PAROXYSMAL ATRIAL FIBRILLATION: ICD-10-CM

## 2019-02-11 DIAGNOSIS — Z79.4 INSULIN LONG-TERM USE: ICD-10-CM

## 2019-02-11 DIAGNOSIS — F33.0 MAJOR DEPRESSIVE DISORDER, RECURRENT EPISODE, MILD: ICD-10-CM

## 2019-02-11 DIAGNOSIS — J06.9 VIRAL URI WITH COUGH: ICD-10-CM

## 2019-02-11 DIAGNOSIS — I20.89 CHRONIC STABLE ANGINA: ICD-10-CM

## 2019-02-11 DIAGNOSIS — I70.0 ATHEROSCLEROSIS OF ABDOMINAL AORTA: ICD-10-CM

## 2019-02-11 DIAGNOSIS — I10 ESSENTIAL HYPERTENSION: Chronic | ICD-10-CM

## 2019-02-11 DIAGNOSIS — E11.9 DIABETES MELLITUS WITHOUT COMPLICATION: ICD-10-CM

## 2019-02-11 DIAGNOSIS — E66.3 OVERWEIGHT (BMI 25.0-29.9): ICD-10-CM

## 2019-02-11 DIAGNOSIS — Z87.891 PERSONAL HISTORY OF TOBACCO USE, PRESENTING HAZARDS TO HEALTH: ICD-10-CM

## 2019-02-11 DIAGNOSIS — J44.9 CHRONIC OBSTRUCTIVE PULMONARY DISEASE, UNSPECIFIED COPD TYPE: ICD-10-CM

## 2019-02-11 DIAGNOSIS — R42 CHRONIC VERTIGO: ICD-10-CM

## 2019-02-11 DIAGNOSIS — D69.2 SENILE PURPURA: ICD-10-CM

## 2019-02-11 PROCEDURE — 3078F PR MOST RECENT DIASTOLIC BLOOD PRESSURE < 80 MM HG: ICD-10-PCS | Mod: CPTII,S$GLB,, | Performed by: INTERNAL MEDICINE

## 2019-02-11 PROCEDURE — 3075F SYST BP GE 130 - 139MM HG: CPT | Mod: CPTII,S$GLB,, | Performed by: INTERNAL MEDICINE

## 2019-02-11 PROCEDURE — 99999 PR PBB SHADOW E&M-EST. PATIENT-LVL III: CPT | Mod: PBBFAC,,, | Performed by: INTERNAL MEDICINE

## 2019-02-11 PROCEDURE — 3288F FALL RISK ASSESSMENT DOCD: CPT | Mod: CPTII,S$GLB,, | Performed by: INTERNAL MEDICINE

## 2019-02-11 PROCEDURE — 3078F DIAST BP <80 MM HG: CPT | Mod: CPTII,S$GLB,, | Performed by: INTERNAL MEDICINE

## 2019-02-11 PROCEDURE — 99999 PR PBB SHADOW E&M-EST. PATIENT-LVL III: ICD-10-PCS | Mod: PBBFAC,,, | Performed by: INTERNAL MEDICINE

## 2019-02-11 PROCEDURE — 3075F PR MOST RECENT SYSTOLIC BLOOD PRESS GE 130-139MM HG: ICD-10-PCS | Mod: CPTII,S$GLB,, | Performed by: INTERNAL MEDICINE

## 2019-02-11 PROCEDURE — 99215 OFFICE O/P EST HI 40 MIN: CPT | Mod: S$GLB,,, | Performed by: INTERNAL MEDICINE

## 2019-02-11 PROCEDURE — 82043 UR ALBUMIN QUANTITATIVE: CPT

## 2019-02-11 PROCEDURE — 99499 UNLISTED E&M SERVICE: CPT | Mod: S$GLB,,, | Performed by: INTERNAL MEDICINE

## 2019-02-11 PROCEDURE — 1100F PR PT FALLS ASSESS DOC 2+ FALLS/FALL W/INJURY/YR: ICD-10-PCS | Mod: CPTII,S$GLB,, | Performed by: INTERNAL MEDICINE

## 2019-02-11 PROCEDURE — 1100F PTFALLS ASSESS-DOCD GE2>/YR: CPT | Mod: CPTII,S$GLB,, | Performed by: INTERNAL MEDICINE

## 2019-02-11 PROCEDURE — 3288F PR FALLS RISK ASSESSMENT DOCUMENTED: ICD-10-PCS | Mod: CPTII,S$GLB,, | Performed by: INTERNAL MEDICINE

## 2019-02-11 PROCEDURE — 99215 PR OFFICE/OUTPT VISIT, EST, LEVL V, 40-54 MIN: ICD-10-PCS | Mod: S$GLB,,, | Performed by: INTERNAL MEDICINE

## 2019-02-11 PROCEDURE — 99499 RISK ADDL DX/OHS AUDIT: ICD-10-PCS | Mod: S$GLB,,, | Performed by: INTERNAL MEDICINE

## 2019-02-11 NOTE — PROGRESS NOTES
HISTORY OF PRESENT ILLNESS:  Percy Ramirez is a 78 y.o. male who presents to the clinic today for Diabetes; Cough; Nasal Congestion; and Sore Throat  .   The patient presents to clinic today for follow-up of his type 2 diabetes mellitus complicated by retinopathy, neuropathy, and peripheral circulatory disorder as well as hypertension/AFib/angina, and hyperlipidemia.  He also has stable COPD.  He is upset because his ENT doctor will not write him any more for diazepam for his vertigo.  He states he only takes it occasionally.  He was given meclizine instead which she states has not helped.  Diabetes: The patient reports that they  do not check blood sugars at home. The patient  denies any problems with low blood sugars. The patient  reports that they have been compliant with current medication plan but have had difficulty with following diet and/or exercise plan on a regular basis.  Hypertension: The patient reports that they check their blood pressures - not done The patient  is not enrolled in the digital hypertension program. The patient denies  cardiac chest pain, headaches and side effects of medication. The patient reports problems with  none. The patient  has been compliant with the current medication regimen.  The patient : tries to follow a low salt diet.  He reports that he has had some respiratory symptoms for the last 2-3 days.  He states his 11-year-old grandson has strep throat and his son has been coughing.  He states his symptoms started about 2 days ago.  He also has a runny nose.  No fever.  He has not yet taken anything for his symptoms.      PAST MEDICAL HISTORY:  Past Medical History:   Diagnosis Date    Allergy     Arthritis     CAD, multiple vessel     DR Melissa    Cataract     Depression     Hyperlipidemia     Hypertension     Macular degeneration     Dr Razo for injection, Jennifer for eye    S/P CABG x 2     Type 2 diabetes mellitus with circulatory disorder     Dr. Aquino        PAST SURGICAL HISTORY:  Past Surgical History:   Procedure Laterality Date    broken elbow      left    CARPAL METACARPAL INTERPOSITIONAL ARTHROPLASTY-THUMB Right 2017    [facility] at [facility]    COLONOSCOPY N/A 10/4/2017    Performed by Gabriel Leung [facility] at [facility] ENDO    EYE SURGERY      cataract    heart bypass      2004    HERNIA REPAIR      right    RECONSTRUCTION-TENDON-FLEXOR CARPI RADIAS Right 2017    [facility] at [facility] OR    ROTATOR CUFF REPAIR      right         SOCIAL HISTORY:  Social History     Socioeconomic History    Marital status:      Spouse name: Not on file    Number of children: 4    Years of education: GED    Highest education level: Not on file   Social Needs    Financial resource strain: Not on file    Food insecurity - worry: Not on file    Food insecurity - inability: Not on file    Transportation needs - medical: Not on file    Transportation needs - non-medical: Not on file   Occupational History    Occupation: retired tug    Tobacco Use    Smoking status: Former Smoker     Packs/day: 3.00     Years: 45.00     Pack years: 135.00     Types: Cigarettes     Last attempt to quit: 2004     Years since quittin.8    Smokeless tobacco: Never Used   Substance and Sexual Activity    Alcohol use: Yes     Comment: was heavy drinker 35 years ago, rare use now    Drug use: No    Sexual activity: Yes     Partners: Female   Other Topics Concern    Not on file   Social History Narrative    Not on file       FAMILY HISTORY:  Family History   Problem Relation Age of Onset    Arthritis Mother     Diabetes Mother     Stroke Mother     Heart attack Father     Cancer Brother     Throat cancer Brother     Diabetes Maternal Aunt     Diabetes Maternal Uncle     Heart disease Maternal Uncle     Diabetes Paternal Aunt     Heart disease Paternal Aunt     Heart disease Paternal  "Uncle     Heart disease Maternal Grandmother     Cancer Maternal Grandfather        ALLERGIES AND MEDICATIONS: updated and reviewed.  Review of patient's allergies indicates:   Allergen Reactions    Codeine Nausea Only     weak    Ranexa [ranolazine]      Medication List with Changes/Refills   Current Medications    ALBUTEROL (PROVENTIL/VENTOLIN HFA) 90 MCG/ACTUATION INHALER    Inhale 2 puffs into the lungs every 6 (six) hours as needed for Wheezing or Shortness of Breath.    BD INSULIN PEN NEEDLE UF SHORT 31 GAUGE X 5/16" NDLE        BD INSULIN SYRINGE ULTRA-FINE 1/2 ML 31 GAUGE X 15/64" SYRG        CARVEDILOL (COREG) 25 MG TABLET    TAKE 1 TABLET BY MOUTH TWICE DAILY    CINNAMON BARK 500 MG CAPSULE    Take 1,000 mg by mouth once daily.      CLOPIDOGREL (PLAVIX) 75 MG TABLET    Take 75 mg by mouth once daily.    DULOXETINE (CYMBALTA) 30 MG CAPSULE    Take 1 capsule (30 mg total) by mouth once daily.    FUROSEMIDE (LASIX) 40 MG TABLET    Take 40 mg by mouth once daily.     GABAPENTIN (NEURONTIN) 300 MG CAPSULE    TAKE 2 CAPSULES BY MOUTH 3 TIMES DAILY    GLIMEPIRIDE (AMARYL) 4 MG TABLET    Take 4 mg by mouth daily with breakfast.     INSULIN NPH-INSULIN REGULAR, 70/30, (NOVOLIN 70/30) 100 UNIT/ML (70-30) INJECTION    Inject into the skin. Inject 5 units at breakfast and inject 5 units at night    INSULIN SYRINGE-NEEDLE U-100 1/2 ML 31 X 5/16" SYRG        LOSARTAN (COZAAR) 100 MG TABLET    TAKE 1 TABLET BY MOUTH ONCE DAILY    MECLIZINE (ANTIVERT) 12.5 MG TABLET    Take 1 tablet (12.5 mg total) by mouth 3 (three) times daily as needed for Dizziness.    METFORMIN (GLUCOPHAGE) 500 MG TABLET    Take 1,000 mg by mouth 2 (two) times daily with meals. 2 Tablets in the morning, 2 Tablets in the evening    NITROGLYCERIN (NITROSTAT) 0.3 MG SL TABLET    PLACE 1 TABLET UNDER TONGUE AS NEEDED FOR CHEST PAIN EVERY 5 MINUTES, UP TO 3 DOSES IN 15MINUTES    OM-3/DHA/EPA/FISH OIL/VIT D3 (FISH OIL-VIT D3 ORAL)    Take 2,000 Units " by mouth once daily.     PRAVASTATIN (PRAVACHOL) 20 MG TABLET    Take 20 mg by mouth once daily.    RIVASTIGMINE TARTRATE (EXELON) 1.5 MG CAPSULE    Take 1 capsule (1.5 mg total) by mouth 2 (two) times daily.    SPIRONOLACTONE (ALDACTONE) 25 MG TABLET    25 mg once daily.     VIT C/VIT E AC/LUT/COPPER/ZINC (PRESERVISION LUTEIN ORAL)    Take by mouth.    WARFARIN (COUMADIN) 5 MG TABLET    Take 2 tabs by mouth on Tuesday,Thursday, Saturday and Sundays then 1.5 on all other days.   Discontinued Medications    AMOXICILLIN-CLAVULANATE 875-125MG (AUGMENTIN) 875-125 MG PER TABLET              CARE TEAM:  Patient Care Team:  Kasie Edmondson MD as PCP - General (Internal Medicine)  Jac Rodriguez OD as Consulting Provider (Optometry)  Trever Arevalo MD as Consulting Physician (Ophthalmology)  Philippe Aquino MD as Consulting Physician (Endocrinology)  Mac Valderrama MD as Consulting Physician (Cardiology)  Thierno Santiago MD as Consulting Physician (Cardiology)  Marifer Romero DPM as Consulting Physician (Podiatry)         REVIEW OF SYSTEMS:  Review of Systems   Constitutional: Negative for chills, fatigue, fever and unexpected weight change.   HENT: Positive for congestion and sore throat. Negative for ear pain and voice change.    Eyes: Positive for discharge (- his right eye has been having clear tearing) and visual disturbance (- has very poor vision (chronic)). Negative for photophobia and pain.   Respiratory: Positive for cough. Negative for shortness of breath and wheezing.         He states he has oxygen at home from a hospitalization more than a year ago.  He states he does not want to have it in his house anymore.   Cardiovascular: Negative for chest pain, palpitations and leg swelling.   Gastrointestinal: Negative for abdominal pain, blood in stool, constipation, diarrhea, nausea and vomiting.   Genitourinary: Negative for dysuria and frequency.   Musculoskeletal: Positive for arthralgias. Negative  "for gait problem, joint swelling and neck stiffness.   Skin: Negative for color change and rash.   Neurological: Positive for dizziness (- mild/intermittent). Negative for seizures, weakness and headaches.   Hematological: Negative for adenopathy. Does not bruise/bleed easily.   Psychiatric/Behavioral: Negative for behavioral problems and dysphoric mood. The patient is not nervous/anxious.          PHYSICAL EXAM:  Vitals:    02/11/19 0911   BP: 138/68   Pulse: 68   Temp: 98.3 °F (36.8 °C)     Weight: 86.1 kg (189 lb 14.8 oz)   Height: 5' 6" (167.6 cm)   Body mass index is 30.65 kg/m².    Physical Examination: General appearance - alert, well appearing, and in no distress and obese  Mental status - alert, oriented to person, place, and time, normal mood, behavior, speech, dress, motor activity, and thought processes  Eyes - pupils equal and reactive, extraocular eye movements intact, sclera anicteric, he had a few clear tears coming from his right eye  Mouth - mucous membranes moist, pharynx normal without lesions  Neck - supple, no significant adenopathy, carotids upstroke normal bilaterally, no bruits  Lymphatics - no palpable lymphadenopathy  Chest - clear to auscultation, no wheezes, rales or rhonchi, symmetric air entry  Heart - normal rate and regular rhythm, no gallops noted  Neurological - alert, oriented, normal speech, no focal findings or movement disorder noted, cranial nerves II through XII intact  Musculoskeletal - no muscular tenderness noted, Moderate osteoarthritic changes noted to both knee joints. No joint effusions noted.   Extremities - no pedal edema noted  Skin - normal coloration and turgor, no rashes, no suspicious skin lesions noted       Lab Results   Component Value Date    HGBA1C 7.6 (H) 12/17/2018    HGBA1C 7.2 (H) 08/08/2018    HGBA1C 6.9 (H) 01/23/2018      Lab Results   Component Value Date    CHOL 98 (L) 10/22/2013     Lab Results   Component Value Date    LDLCALC 58 12/17/2018    " LDLCALC 63 01/24/2018    LDLCALC 50 10/09/2017          ASSESSMENT AND PLAN:  1. Type 2 diabetes, uncontrolled, with retinopathy/2. Type 2 diabetes, uncontrolled, with neuropathy/3. Type 2 diabetes, uncontrolled, with peripheral circulatory disorder/4. 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 Insulin long-term use  Diabetes currently is controlled for age and comorbid conditions. We discussed diabetic diet and regular exercise. We discussed home blood sugar monitoring, if appropriate. Continue current medication regimen. and Followed by endocrinology.  Diabetic complications addressed: Neuropathy pain controlled. and Patient denies claudication symptoms at this time.  Patient was counseled on the need for yearly diabetic retinopathy exam and yearly diabetic foot exam.     6. Essential hypertension/7. Paroxysmal atrial fibrillation/8. Chronic stable angina  His blood pressure is controlled today.  Continue current regimen.  Followed by Cardiology.    9. Hyperlipidemia LDL goal <100  We discussed low fat diet and regular exercise.The current medical regimen is effective;  continue present plan and medications.      10. Chronic obstructive pulmonary disease, unspecified COPD type  Stable.  He wishes oxygen removed from his home.  I discussed with him that is oxygen does drop air in office, but is not low enough for him to need oxygen at home.  I will check an overnight pulse oximetry test and if it also does not qualify him for oxygen we will have it removed from his home.  - Pulse oximetry overnight; Future    11. Atherosclerosis of abdominal aorta  Patient with Atherosclerosis of the Aorta.  Stable/asymptomatic. Currently stable on lipid lowering medication and b/p monitoring.     12. Major depressive disorder, recurrent episode, mild  The current medical regimen is effective;  continue present plan and medications.     13. Obstructive sleep apnea on CPAP  CPAP compliance:  yes.  We discussed the potential ramifications of untreated sleep apnea, which could include daytime sleepiness, hypertension, heart disease including CHF, sudden death while sleeping and/or stroke. The patient was advised to abstain from driving should they feel sleepy or drowsy.  We discussed potential treatment options, which could include weight loss, body positioning, continuous positive airway pressure (CPAP), or referral for surgical consideration.      14. Senile purpura  Stable. Asymptomatic. Observe.     15. Overweight (BMI 25.0-29.9)  The patient is asked to make an attempt to improve diet and exercise patterns to aid in medical management of this problem.     16. Viral URI with cough  I discussed with the patient that he  has a viral illness that will need to run its course.  No need for antibiotics at this time.  We discussed the utilization of over-the-counter medications for symptomatic treatment.  The patient will call me or return to the clinic if symptoms worsen or persist for reevaluation.     17. Chronic vertigo  The patient still had 13 pills left in his bottle.  I advised him that this is a high risk medication at his age.  I advised him of my concerns for him taking this medication.  I encouraged him to cut the diazepam pills in half.  I advised him that I will not prescribe any more than 30 pills every 6 months.  He voiced understanding.    18. Personal history of tobacco use, presenting hazards to health  Will screen for lung cancer per USPTFS guidelines.  - CT Chest Lung Screening Low Dose; Future           Follow-up in about 6 months (around 8/11/2019), or if symptoms worsen or fail to improve, for annual exam. or sooner as needed.

## 2019-02-12 ENCOUNTER — TELEPHONE (OUTPATIENT)
Dept: NEUROLOGY | Facility: CLINIC | Age: 79
End: 2019-02-12

## 2019-02-12 ENCOUNTER — TELEPHONE (OUTPATIENT)
Dept: FAMILY MEDICINE | Facility: CLINIC | Age: 79
End: 2019-02-12

## 2019-02-12 LAB
ALBUMIN/CREAT UR: 12 UG/MG
CREAT UR-MCNC: 50 MG/DL
MICROALBUMIN UR DL<=1MG/L-MCNC: 6 UG/ML

## 2019-02-12 NOTE — TELEPHONE ENCOUNTER
----- Message from Jennifer Guzman sent at 2/12/2019  1:13 PM CST -----  Contact: self - [phone]  Pt would like to know when he will be due for another appt with Dr. Victoria.      Appointment booked with patient

## 2019-02-12 NOTE — TELEPHONE ENCOUNTER
Spoke with the pt and informed him of his ct scan will be on 2/15/19 at 11:45 am... Patient verbalized understandings.

## 2019-02-15 ENCOUNTER — HOSPITAL ENCOUNTER (OUTPATIENT)
Dept: RADIOLOGY | Facility: HOSPITAL | Age: 79
Discharge: HOME | End: 2019-02-15
Attending: INTERNAL MEDICINE
Payer: MEDICARE

## 2019-02-15 DIAGNOSIS — Z87.891 PERSONAL HISTORY OF TOBACCO USE, PRESENTING HAZARDS TO HEALTH: ICD-10-CM

## 2019-02-15 PROCEDURE — G0297 LDCT FOR LUNG CA SCREEN: HCPCS | Mod: TC

## 2019-02-15 PROCEDURE — G0297 LDCT FOR LUNG CA SCREEN: HCPCS | Mod: 26,,, | Performed by: RADIOLOGY

## 2019-02-15 PROCEDURE — G0297 CT CHEST LUNG SCREENING LOW DOSE: ICD-10-PCS | Mod: 26,,, | Performed by: RADIOLOGY

## 2019-02-18 ENCOUNTER — OFFICE VISIT (OUTPATIENT)
Dept: NEUROLOGY | Facility: CLINIC | Age: 79
End: 2019-02-18
Payer: MEDICARE

## 2019-02-18 VITALS
HEIGHT: 66 IN | WEIGHT: 188.5 LBS | DIASTOLIC BLOOD PRESSURE: 73 MMHG | SYSTOLIC BLOOD PRESSURE: 148 MMHG | HEART RATE: 72 BPM | BODY MASS INDEX: 30.29 KG/M2

## 2019-02-18 DIAGNOSIS — G31.84 MCI (MILD COGNITIVE IMPAIRMENT): ICD-10-CM

## 2019-02-18 PROCEDURE — 3077F PR MOST RECENT SYSTOLIC BLOOD PRESSURE >= 140 MM HG: ICD-10-PCS | Mod: CPTII,S$GLB,, | Performed by: NEUROLOGICAL SURGERY

## 2019-02-18 PROCEDURE — 99999 PR PBB SHADOW E&M-EST. PATIENT-LVL III: ICD-10-PCS | Mod: PBBFAC,,, | Performed by: NEUROLOGICAL SURGERY

## 2019-02-18 PROCEDURE — 99214 OFFICE O/P EST MOD 30 MIN: CPT | Mod: S$GLB,,, | Performed by: NEUROLOGICAL SURGERY

## 2019-02-18 PROCEDURE — 3078F PR MOST RECENT DIASTOLIC BLOOD PRESSURE < 80 MM HG: ICD-10-PCS | Mod: CPTII,S$GLB,, | Performed by: NEUROLOGICAL SURGERY

## 2019-02-18 PROCEDURE — 3078F DIAST BP <80 MM HG: CPT | Mod: CPTII,S$GLB,, | Performed by: NEUROLOGICAL SURGERY

## 2019-02-18 PROCEDURE — 99214 PR OFFICE/OUTPT VISIT, EST, LEVL IV, 30-39 MIN: ICD-10-PCS | Mod: S$GLB,,, | Performed by: NEUROLOGICAL SURGERY

## 2019-02-18 PROCEDURE — 1101F PT FALLS ASSESS-DOCD LE1/YR: CPT | Mod: CPTII,S$GLB,, | Performed by: NEUROLOGICAL SURGERY

## 2019-02-18 PROCEDURE — 99999 PR PBB SHADOW E&M-EST. PATIENT-LVL III: CPT | Mod: PBBFAC,,, | Performed by: NEUROLOGICAL SURGERY

## 2019-02-18 PROCEDURE — 1101F PR PT FALLS ASSESS DOC 0-1 FALLS W/OUT INJ PAST YR: ICD-10-PCS | Mod: CPTII,S$GLB,, | Performed by: NEUROLOGICAL SURGERY

## 2019-02-18 PROCEDURE — 3077F SYST BP >= 140 MM HG: CPT | Mod: CPTII,S$GLB,, | Performed by: NEUROLOGICAL SURGERY

## 2019-02-18 RX ORDER — RIVASTIGMINE TARTRATE 1.5 MG/1
1.5 CAPSULE ORAL 2 TIMES DAILY
Qty: 180 CAPSULE | Refills: 3 | Status: SHIPPED | OUTPATIENT
Start: 2019-02-18 | End: 2019-03-13 | Stop reason: SDUPTHER

## 2019-02-18 NOTE — PROGRESS NOTES
Chief Complaint   Patient presents with    Memory Loss        Percy Ramirez is a 78 y.o. male with a history of multiple medical diagnoses as listed below that presents for evaluation of memory loss.  He says he has been having short-term memory loss and has been having more difficulty with functioning around the home.  He says that he has been making some errors on occasion with his medications a seems to be more forgetful when it comes to functioning around the home.  He does not feel that there has been any impact on him being able to care for himself .  He says that one of his biggest problems has been remembering names, but other smaller things have been bothersome as well such as forgetting what he felt into a room to get or forgetting a task that he wanted to do around the home.    Interval History  06/18/2018  He has been doing about the same overall since he was last seen in clinic.  He has been bothered by a scratch on the left hand that he cannot get to stop bleeding.  He said that he noticed a scratch on Saturday and this by bending area he has continued to have some oozing of blood there.  He has not had any other problems in the time since he was last seen.    02/18/2019  Patient presents for routine follow-up.  He says that his memory continues to be a source of concern for him.  He has not had any significant change in his memory status. The patient presents alone to clinic today.  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  He says that since he is retired due to his age, ailing health, and failing vision he has been sitting around the home watching television most of the day.  He admits that he does not engage in much physical or mental  activity.  He has been taking Aricept as directed without any complaints of any side effects.    PAST MEDICAL HISTORY:  Past Medical History:   Diagnosis Date    Allergy     Arthritis     CAD, multiple vessel     DR Melissa    Cataract     Depression     Hyperlipidemia     Hypertension     Macular degeneration     Dr Razo for injection, Jennifer for eye    S/P CABG x 2     Type 2 diabetes mellitus with circulatory disorder     Dr. Aquino       PAST SURGICAL HISTORY:  Past Surgical History:   Procedure Laterality Date    broken elbow      left    CARPAL METACARPAL INTERPOSITIONAL ARTHROPLASTY-THUMB Right 2017    [facility]    COLONOSCOPY N/A 10/4/2017    Performed by Gabriel Leung [facility] at [facility] ENDO    EYE SURGERY      cataract    heart bypass      2004    HERNIA REPAIR      right    RECONSTRUCTION-TENDON-FLEXOR CARPI RADIAS Right 2017    [facility] at [facility] OR    ROTATOR CUFF REPAIR      right         SOCIAL HISTORY:  Social History     Socioeconomic History    Marital status:      Spouse name: Not on file    Number of children: 4    Years of education: GED    Highest education level: Not on file   Social Needs    Financial resource strain: Not on file    Food insecurity - worry: Not on file    Food insecurity - inability: Not on file    Transportation needs - medical: Not on file    Transportation needs - non-medical: Not on file   Occupational History    Occupation: retired tug    Tobacco Use    Smoking status: Former Smoker     Packs/day: 3.00     Years: 45.00     Pack years: 135.00     Types: Cigarettes     Last attempt to quit: 2004     Years since quittin.8    Smokeless tobacco: Never Used   Substance and Sexual Activity    Alcohol use: Yes     Comment: was heavy drinker 35 years ago, rare use now    Drug use: No    Sexual activity: Yes     Partners: Female   Other  "Topics Concern    Not on file   Social History Narrative    Not on file       FAMILY HISTORY:  Family History   Problem Relation Age of Onset    Arthritis Mother     Diabetes Mother     Stroke Mother     Heart attack Father     Cancer Brother     Throat cancer Brother     Diabetes Maternal Aunt     Diabetes Maternal Uncle     Heart disease Maternal Uncle     Diabetes Paternal Aunt     Heart disease Paternal Aunt     Heart disease Paternal Uncle     Heart disease Maternal Grandmother     Cancer Maternal Grandfather        ALLERGIES AND MEDICATIONS: updated and reviewed.  Review of patient's allergies indicates:   Allergen Reactions    Codeine Nausea Only     weak    Ranexa [ranolazine]      Current Outpatient Medications   Medication Sig Dispense Refill    albuterol (PROVENTIL/VENTOLIN HFA) 90 mcg/actuation inhaler Inhale 2 puffs into the lungs every 6 (six) hours as needed for Wheezing or Shortness of Breath. 18 g 0    BD INSULIN PEN NEEDLE UF SHORT 31 gauge x 5/16" Ndle       BD INSULIN SYRINGE ULTRA-FINE 1/2 mL 31 gauge x 15/64" Syrg       carvedilol (COREG) 25 MG tablet TAKE 1 TABLET BY MOUTH TWICE DAILY 180 tablet 1    cinnamon bark 500 mg capsule Take 1,000 mg by mouth once daily.        clopidogrel (PLAVIX) 75 mg tablet Take 75 mg by mouth once daily.      DULoxetine (CYMBALTA) 30 MG capsule Take 1 capsule (30 mg total) by mouth once daily. 90 capsule 0    furosemide (LASIX) 40 MG tablet Take 40 mg by mouth once daily.       gabapentin (NEURONTIN) 300 MG capsule TAKE 2 CAPSULES BY MOUTH 3 TIMES DAILY 540 capsule 1    glimepiride (AMARYL) 4 MG tablet Take 4 mg by mouth daily with breakfast.       insulin NPH-insulin regular, 70/30, (NOVOLIN 70/30) 100 unit/mL (70-30) injection Inject into the skin. Inject 5 units at breakfast and inject 5 units at night      insulin syringe-needle U-100 1/2 mL 31 x 5/16" Syrg       losartan (COZAAR) 100 MG tablet TAKE 1 TABLET BY MOUTH ONCE DAILY " 90 tablet 1    meclizine (ANTIVERT) 12.5 mg tablet Take 1 tablet (12.5 mg total) by mouth 3 (three) times daily as needed for Dizziness. 45 tablet 4    metformin (GLUCOPHAGE) 500 MG tablet Take 1,000 mg by mouth 2 (two) times daily with meals. 2 Tablets in the morning, 2 Tablets in the evening      nitroGLYCERIN (NITROSTAT) 0.3 MG SL tablet PLACE 1 TABLET UNDER TONGUE AS NEEDED FOR CHEST PAIN EVERY 5 MINUTES, UP TO 3 DOSES IN 15MINUTES  2    OM-3/DHA/EPA/FISH OIL/VIT D3 (FISH OIL-VIT D3 ORAL) Take 2,000 Units by mouth once daily.       pravastatin (PRAVACHOL) 20 MG tablet Take 20 mg by mouth once daily.      rivastigmine tartrate (EXELON) 1.5 MG capsule Take 1 capsule (1.5 mg total) by mouth 2 (two) times daily. 180 capsule 3    spironolactone (ALDACTONE) 25 MG tablet 25 mg once daily.       VIT C/VIT E AC/LUT/COPPER/ZINC (PRESERVISION LUTEIN ORAL) Take by mouth.      warfarin (COUMADIN) 5 MG tablet Take 2 tabs by mouth on Tuesday,Thursday, Saturday and Sundays then 1.5 on all other days.       No current facility-administered medications for this visit.        Review of Systems   Constitutional: Negative for activity change, fatigue and unexpected weight change.   HENT: Negative for trouble swallowing and voice change.    Eyes: Negative for photophobia, pain and visual disturbance.   Respiratory: Negative for apnea and shortness of breath.    Cardiovascular: Negative for chest pain and palpitations.   Gastrointestinal: Negative for constipation, nausea and vomiting.   Genitourinary: Negative for difficulty urinating.   Musculoskeletal: Negative for arthralgias, back pain, gait problem, myalgias and neck pain.   Skin: Negative for color change and rash.   Neurological: Negative for dizziness, seizures, syncope, speech difficulty, weakness, light-headedness, numbness and headaches.   Psychiatric/Behavioral: Positive for confusion and decreased concentration. Negative for agitation and behavioral problems.        Neurologic Exam     Mental Status   Oriented to person, place, and time.   Oriented to person.   Oriented to city.   Oriented to year, month, date and day.   Registration of memory: 5/5. Recall of objects at 5 minutes: 3/5.   Attention: normal. Concentration: normal.   Speech: speech is normal   Level of consciousness: alert  Knowledge: good.   Able to name object. Unable to repeat.     Cranial Nerves     CN II   Visual fields full to confrontation.   Right visual field deficit: none  Left visual field deficit: none     CN III, IV, VI   Pupils are equal, round, and reactive to light.  Extraocular motions are normal.   Right pupil: Size: 3 mm. Shape: regular. Accommodation: intact.   Left pupil: Size: 3 mm. Shape: regular. Accommodation: intact.   CN III: no CN III palsy  CN VI: no CN VI palsy  Nystagmus: none   Diplopia: none  Ophthalmoparesis: none  Upgaze: normal  Downgaze: normal  Conjugate gaze: present    CN V   Facial sensation intact.   Right facial sensation deficit: none  Left facial sensation deficit: none    CN VII   Facial expression full, symmetric.   Right facial weakness: none  Left facial weakness: none    CN VIII   CN VIII normal.     CN IX, X   CN IX normal.   CN X normal.   Palate: symmetric    CN XI   CN XI normal.   Right sternocleidomastoid strength: normal  Left sternocleidomastoid strength: normal  Right trapezius strength: normal  Left trapezius strength: normal    CN XII   CN XII normal.   Tongue deviation: none    Motor Exam   Muscle bulk: normal  Overall muscle tone: normal  Right arm tone: normal  Left arm tone: normal  Right leg tone: normal  Left leg tone: normal    Strength   Strength 5/5 throughout.     Sensory Exam   Right arm light touch: normal  Left arm light touch: normal  Right leg light touch: normal  Left leg light touch: normal  Right arm vibration: normal  Left arm vibration: normal  Right leg vibration: normal  Left leg vibration: normal  Right arm proprioception:  normal  Left arm proprioception: normal  Right leg proprioception: normal  Left leg proprioception: normal  Right arm pinprick: normal  Left arm pinprick: normal  Right leg pinprick: normal  Left leg pinprick: normal    Gait, Coordination, and Reflexes     Gait  Gait: normal    Coordination   Romberg: negative  Finger to nose coordination: normal  Heel to shin coordination: normal  Tandem walking coordination: normal    Tremor   Resting tremor: absent    Reflexes   Right brachioradialis: 2+  Left brachioradialis: 2+  Right biceps: 2+  Left biceps: 2+  Right triceps: 2+  Left triceps: 2+  Right patellar: 2+  Left patellar: 2+  Right achilles: 2+  Left achilles: 2+  Right plantar: normal  Left plantar: normal      Physical Exam   Constitutional: He is oriented to person, place, and time. He appears well-developed and well-nourished.   HENT:   Head: Normocephalic and atraumatic.   Eyes: EOM are normal. Pupils are equal, round, and reactive to light.   Cardiovascular: Intact distal pulses.   Pulmonary/Chest: Effort normal. No respiratory distress.   Musculoskeletal: Normal range of motion.   Neurological: He is alert and oriented to person, place, and time. He has normal strength. He has a normal Finger-Nose-Finger Test, a normal Heel to Shin Test, a normal Romberg Test and a normal Tandem Gait Test. Gait normal.   Reflex Scores:       Tricep reflexes are 2+ on the right side and 2+ on the left side.       Bicep reflexes are 2+ on the right side and 2+ on the left side.       Brachioradialis reflexes are 2+ on the right side and 2+ on the left side.       Patellar reflexes are 2+ on the right side and 2+ on the left side.       Achilles reflexes are 2+ on the right side and 2+ on the left side.  Skin: Skin is warm and dry.   Psychiatric: He has a normal mood and affect. His speech is normal and behavior is normal.       Vitals:    02/18/19 1042   BP: (!) 148/73   BP Location: Right arm   Patient Position: Sitting   BP  "Method: Large (Automatic)   Pulse: 72   Weight: 85.5 kg (188 lb 7.9 oz)   Height: 5' 6" (1.676 m)     MOCA 18/25 (visual-spatial portions of the examination were refused, as the patient has poor vision)  MRI brain personally reviewed:  Chronic microvascular ischemic changes noted.    Assessment & Plan:    Problem List Items Addressed This Visit     MCI (mild cognitive impairment)    Overview     Patient vision, lack of activity, and half-way have all contribute to the patient's decline in his overall mental health.  No evidence of dementia.  Patient stable on current dose of Aricept.         Relevant Medications    rivastigmine tartrate (EXELON) 1.5 MG capsule        Follow-up: Follow-up in about 6 months (around 8/18/2019).  More than 50% of this 25 minute encounter was spent in counseling and coordinating care a mild cognitive impairment.      "

## 2019-02-19 ENCOUNTER — TELEPHONE (OUTPATIENT)
Dept: FAMILY MEDICINE | Facility: CLINIC | Age: 79
End: 2019-02-19

## 2019-02-19 DIAGNOSIS — R91.8 ABNORMAL CT SCAN OF LUNG: Primary | ICD-10-CM

## 2019-02-21 ENCOUNTER — TELEPHONE (OUTPATIENT)
Dept: FAMILY MEDICINE | Facility: CLINIC | Age: 79
End: 2019-02-21

## 2019-02-21 NOTE — TELEPHONE ENCOUNTER
Spoke with patient and he spoke with the nurse at Dr Alexandre's office and he said that she said that she did not received order for him to see Dr Alexandre. Informed him that referral was placed on yesterday. If he does not hear from his office today to contact them on tomorrow with scheduling.

## 2019-02-21 NOTE — TELEPHONE ENCOUNTER
----- Message from Brandon Julien sent at 2/21/2019  9:23 AM CST -----  Contact: Percy [phone]  Type: Patient Call Back    Who called:Percy Ramirez    What is the request in detail: The patient is requesting a call back from the staff in regards to a CT scan that Dr. Edmondson was supposed to send over to Dr. Alexandre, the pulmonary specialist      Best call back number:[phone]

## 2019-03-01 RX ORDER — NITROGLYCERIN 0.3 MG/1
TABLET SUBLINGUAL
Qty: 30 TABLET | Refills: 2 | Status: SHIPPED | OUTPATIENT
Start: 2019-03-01 | End: 2019-03-06 | Stop reason: SDUPTHER

## 2019-03-01 NOTE — TELEPHONE ENCOUNTER
----- Message from Brandon Julien sent at 3/1/2019 11:42 AM CST -----  Contact: Percy [phone]  REFILL: nitroGLYCERIN (NITROSTAT) 0.3 MG SL tablet    PHARMACY: [facility] - [place], [place] - [address] [place] [address]

## 2019-03-06 DIAGNOSIS — I10 ESSENTIAL HYPERTENSION: ICD-10-CM

## 2019-03-06 RX ORDER — NITROGLYCERIN 0.3 MG/1
TABLET SUBLINGUAL
Qty: 100 TABLET | Refills: 0 | Status: SHIPPED | OUTPATIENT
Start: 2019-03-06 | End: 2019-03-13 | Stop reason: SDUPTHER

## 2019-03-06 RX ORDER — CARVEDILOL 25 MG/1
TABLET ORAL
Qty: 180 TABLET | Refills: 1 | Status: SHIPPED | OUTPATIENT
Start: 2019-03-06 | End: 2019-03-13 | Stop reason: SDUPTHER

## 2019-03-11 ENCOUNTER — TELEPHONE (OUTPATIENT)
Dept: FAMILY MEDICINE | Facility: CLINIC | Age: 79
End: 2019-03-11

## 2019-03-11 ENCOUNTER — TELEPHONE (OUTPATIENT)
Dept: NEUROLOGY | Facility: CLINIC | Age: 79
End: 2019-03-11

## 2019-03-11 NOTE — TELEPHONE ENCOUNTER
Noted    -- Message from Eileen Jessica sent at 3/11/2019  1:33 PM CDT -----  Contact: Self/ [phone]  Patient is switching over to [email or web address].  They will be contacting your office by email. Thank you.

## 2019-03-11 NOTE — TELEPHONE ENCOUNTER
----- Message from Eileen Jessica sent at 3/11/2019  1:30 PM CDT -----  Contact: Self/ [phone]  Patient is switching over to [email or web address] [email or web address].  They will be contacting your office by email. Thank you.

## 2019-03-13 DIAGNOSIS — I10 ESSENTIAL HYPERTENSION: ICD-10-CM

## 2019-03-13 DIAGNOSIS — F33.0 MAJOR DEPRESSIVE DISORDER, RECURRENT EPISODE, MILD: ICD-10-CM

## 2019-03-13 DIAGNOSIS — G31.84 MCI (MILD COGNITIVE IMPAIRMENT): ICD-10-CM

## 2019-03-13 DIAGNOSIS — J44.1 COPD EXACERBATION: ICD-10-CM

## 2019-03-13 RX ORDER — DULOXETIN HYDROCHLORIDE 30 MG/1
30 CAPSULE, DELAYED RELEASE ORAL DAILY
Qty: 90 CAPSULE | Refills: 1 | Status: SHIPPED | OUTPATIENT
Start: 2019-03-13 | End: 2019-05-07 | Stop reason: SDUPTHER

## 2019-03-13 RX ORDER — CARVEDILOL 25 MG/1
25 TABLET ORAL 2 TIMES DAILY
Qty: 180 TABLET | Refills: 1 | Status: SHIPPED | OUTPATIENT
Start: 2019-03-13 | End: 2019-03-21 | Stop reason: SDUPTHER

## 2019-03-13 RX ORDER — NITROGLYCERIN 0.3 MG/1
TABLET SUBLINGUAL
Qty: 100 TABLET | Refills: 0 | Status: SHIPPED | OUTPATIENT
Start: 2019-03-13 | End: 2020-11-06

## 2019-03-13 RX ORDER — ALBUTEROL SULFATE 90 UG/1
2 AEROSOL, METERED RESPIRATORY (INHALATION) EVERY 6 HOURS PRN
Qty: 18 G | Refills: 0 | Status: SHIPPED | OUTPATIENT
Start: 2019-03-13 | End: 2019-03-29 | Stop reason: SDUPTHER

## 2019-03-14 RX ORDER — RIVASTIGMINE TARTRATE 1.5 MG/1
1.5 CAPSULE ORAL 2 TIMES DAILY
Qty: 180 CAPSULE | Refills: 3 | Status: SHIPPED | OUTPATIENT
Start: 2019-03-14 | End: 2020-07-20 | Stop reason: SDUPTHER

## 2019-03-14 RX ORDER — DONEPEZIL HYDROCHLORIDE 10 MG/1
10 TABLET, FILM COATED ORAL NIGHTLY
Qty: 30 TABLET | Refills: 11 | Status: SHIPPED | OUTPATIENT
Start: 2019-03-14 | End: 2021-05-13

## 2019-03-15 DIAGNOSIS — H81.392 PERIPHERAL VERTIGO INVOLVING LEFT EAR: ICD-10-CM

## 2019-03-18 ENCOUNTER — OFFICE VISIT (OUTPATIENT)
Dept: PODIATRY | Facility: CLINIC | Age: 79
End: 2019-03-18
Payer: MEDICARE

## 2019-03-18 VITALS
BODY MASS INDEX: 30.29 KG/M2 | SYSTOLIC BLOOD PRESSURE: 140 MMHG | WEIGHT: 188.5 LBS | DIASTOLIC BLOOD PRESSURE: 80 MMHG | HEIGHT: 66 IN

## 2019-03-18 DIAGNOSIS — L84 CORN OR CALLUS: ICD-10-CM

## 2019-03-18 DIAGNOSIS — E11.49 TYPE II DIABETES MELLITUS WITH NEUROLOGICAL MANIFESTATIONS: ICD-10-CM

## 2019-03-18 DIAGNOSIS — B35.1 ONYCHOMYCOSIS DUE TO DERMATOPHYTE: ICD-10-CM

## 2019-03-18 PROCEDURE — 99999 PR PBB SHADOW E&M-EST. PATIENT-LVL III: CPT | Mod: PBBFAC,,, | Performed by: PODIATRIST

## 2019-03-18 PROCEDURE — 11056 PR TRIM BENIGN HYPERKERATOTIC SKIN LESION,2-4: ICD-10-PCS | Mod: Q9,S$GLB,, | Performed by: PODIATRIST

## 2019-03-18 PROCEDURE — 99499 UNLISTED E&M SERVICE: CPT | Mod: S$GLB,,, | Performed by: PODIATRIST

## 2019-03-18 PROCEDURE — 11056 PARNG/CUTG B9 HYPRKR LES 2-4: CPT | Mod: Q9,S$GLB,, | Performed by: PODIATRIST

## 2019-03-18 PROCEDURE — 11721 PR DEBRIDEMENT OF NAILS, 6 OR MORE: ICD-10-PCS | Mod: 59,Q9,S$GLB, | Performed by: PODIATRIST

## 2019-03-18 PROCEDURE — 99999 PR PBB SHADOW E&M-EST. PATIENT-LVL III: ICD-10-PCS | Mod: PBBFAC,,, | Performed by: PODIATRIST

## 2019-03-18 PROCEDURE — 99499 NO LOS: ICD-10-PCS | Mod: S$GLB,,, | Performed by: PODIATRIST

## 2019-03-18 PROCEDURE — 11721 DEBRIDE NAIL 6 OR MORE: CPT | Mod: 59,Q9,S$GLB, | Performed by: PODIATRIST

## 2019-03-18 RX ORDER — MECLIZINE HCL 12.5 MG 12.5 MG/1
25 TABLET ORAL 3 TIMES DAILY PRN
Qty: 45 TABLET | Refills: 4 | Status: SHIPPED | OUTPATIENT
Start: 2019-03-18 | End: 2019-03-26 | Stop reason: SDUPTHER

## 2019-03-19 ENCOUNTER — TELEPHONE (OUTPATIENT)
Dept: NEUROLOGY | Facility: CLINIC | Age: 79
End: 2019-03-19

## 2019-03-19 DIAGNOSIS — I10 ESSENTIAL HYPERTENSION: ICD-10-CM

## 2019-03-19 RX ORDER — WARFARIN 7.5 MG/1
TABLET ORAL
COMMUNITY
End: 2021-05-13

## 2019-03-19 NOTE — TELEPHONE ENCOUNTER
----- Message from Delia Saunders sent at 3/19/2019 11:28 AM CDT -----  Contact: Pt  Pt is requesting a callback from office regarding his medication    Pt can be reached at [phone]      Told patient to take both meds Dr Victoria sent

## 2019-03-19 NOTE — TELEPHONE ENCOUNTER
Please clarify and queue up exactly which prescriptions need to go to mail order at this time and queue up the correct pharmacy. Thanks!

## 2019-03-20 ENCOUNTER — OFFICE VISIT (OUTPATIENT)
Dept: PULMONOLOGY | Facility: CLINIC | Age: 79
End: 2019-03-20
Payer: MEDICARE

## 2019-03-20 VITALS
DIASTOLIC BLOOD PRESSURE: 88 MMHG | OXYGEN SATURATION: 93 % | HEIGHT: 66 IN | HEART RATE: 57 BPM | SYSTOLIC BLOOD PRESSURE: 166 MMHG | WEIGHT: 189.31 LBS | BODY MASS INDEX: 30.42 KG/M2 | TEMPERATURE: 98 F

## 2019-03-20 DIAGNOSIS — R91.1 PULMONARY NODULE: ICD-10-CM

## 2019-03-20 DIAGNOSIS — G47.33 OBSTRUCTIVE SLEEP APNEA ON CPAP: ICD-10-CM

## 2019-03-20 DIAGNOSIS — R06.09 DYSPNEA ON EXERTION: ICD-10-CM

## 2019-03-20 PROCEDURE — 3079F PR MOST RECENT DIASTOLIC BLOOD PRESSURE 80-89 MM HG: ICD-10-PCS | Mod: CPTII,S$GLB,, | Performed by: INTERNAL MEDICINE

## 2019-03-20 PROCEDURE — 99499 UNLISTED E&M SERVICE: CPT | Mod: S$GLB,,, | Performed by: INTERNAL MEDICINE

## 2019-03-20 PROCEDURE — 1100F PR PT FALLS ASSESS DOC 2+ FALLS/FALL W/INJURY/YR: ICD-10-PCS | Mod: CPTII,S$GLB,, | Performed by: INTERNAL MEDICINE

## 2019-03-20 PROCEDURE — 99214 PR OFFICE/OUTPT VISIT, EST, LEVL IV, 30-39 MIN: ICD-10-PCS | Mod: S$GLB,,, | Performed by: INTERNAL MEDICINE

## 2019-03-20 PROCEDURE — 3079F DIAST BP 80-89 MM HG: CPT | Mod: CPTII,S$GLB,, | Performed by: INTERNAL MEDICINE

## 2019-03-20 PROCEDURE — 99999 PR PBB SHADOW E&M-EST. PATIENT-LVL V: ICD-10-PCS | Mod: PBBFAC,,, | Performed by: INTERNAL MEDICINE

## 2019-03-20 PROCEDURE — 3288F PR FALLS RISK ASSESSMENT DOCUMENTED: ICD-10-PCS | Mod: CPTII,S$GLB,, | Performed by: INTERNAL MEDICINE

## 2019-03-20 PROCEDURE — 99499 RISK ADDL DX/OHS AUDIT: ICD-10-PCS | Mod: S$GLB,,, | Performed by: INTERNAL MEDICINE

## 2019-03-20 PROCEDURE — 99214 OFFICE O/P EST MOD 30 MIN: CPT | Mod: S$GLB,,, | Performed by: INTERNAL MEDICINE

## 2019-03-20 PROCEDURE — 1100F PTFALLS ASSESS-DOCD GE2>/YR: CPT | Mod: CPTII,S$GLB,, | Performed by: INTERNAL MEDICINE

## 2019-03-20 PROCEDURE — 3077F PR MOST RECENT SYSTOLIC BLOOD PRESSURE >= 140 MM HG: ICD-10-PCS | Mod: CPTII,S$GLB,, | Performed by: INTERNAL MEDICINE

## 2019-03-20 PROCEDURE — 3077F SYST BP >= 140 MM HG: CPT | Mod: CPTII,S$GLB,, | Performed by: INTERNAL MEDICINE

## 2019-03-20 PROCEDURE — 3288F FALL RISK ASSESSMENT DOCD: CPT | Mod: CPTII,S$GLB,, | Performed by: INTERNAL MEDICINE

## 2019-03-20 PROCEDURE — 99999 PR PBB SHADOW E&M-EST. PATIENT-LVL V: CPT | Mod: PBBFAC,,, | Performed by: INTERNAL MEDICINE

## 2019-03-20 NOTE — LETTER
March 20, 2019      Kasie Edmondson MD  [address] [facility]  [place] [address]           [facility]  [address] [facility] [address]  [place] [address]  Phone: [phone]  Fax: [phone]          Patient: Percy Ramirez   MR Number: [id number]   YOB: 1940   Date of Visit: 3/20/2019       Dear Dr. Kasie Edmondson:    Thank you for referring Percy Ramirez to me for evaluation. Attached you will find relevant portions of my assessment and plan of care.    If you have questions, please do not hesitate to call me. I look forward to following Percy Ramirez along with you.    Sincerely,    Jareth Alexandre MD    Enclosure  CC:  No Recipients    If you would like to receive this communication electronically, please contact [email or web address] or [phone] to request more information on [employer] Link access.    For providers and/or their staff who would like to refer a patient to [facility], please contact us through our one-stop-shop provider referral line, [facility], at [phone].    If you feel you have received this communication in error or would no longer like to receive these types of communications, please e-mail [email or web address]

## 2019-03-20 NOTE — PROGRESS NOTES
Subjective:      Patient ID: Percy Ramirez is a 78 y.o. male.    Chief Complaint: Diabetes Mellitus (pcp Delvis 2-11-19); Diabetic Foot Exam; Nail Care; and Poor Circulation    Percy is a 78 y.o. male who presents to the clinic for evaluation and treatment of high risk feet. Percy has a past medical history of Allergy, Arthritis, CAD, multiple vessel, Cataract, Depression, Hyperlipidemia, Hypertension, Macular degeneration, S/P CABG x 2, and Type 2 diabetes mellitus with circulatory disorder. The patient's chief complaint is elongated, thickened toenails aggravated by increased weight bearing, shoe gear, pressure.    This patient has documented high risk feet requiring routine maintenance secondary to diabetes mellitis and those secondary complications of diabetes, as mentioned..    PCP: Kasie Edmondson MD    Date Last Seen by PCP:   Chief Complaint   Patient presents with    Diabetes Mellitus     pcp Delvis 2-11-19    Diabetic Foot Exam    Nail Care    Poor Circulation     Current shoe gear:  rx shoes    Hemoglobin A1C   Date Value Ref Range Status   12/17/2018 7.6 (H) 4.0 - 5.6 % Final     Comment:     Dr. Philippe Aquino ( Endocrinology )   08/08/2018 7.2 (H) 4.0 - 5.6 % Final     Comment:     ADA Screening Guidelines:  5.7-6.4%  Consistent with prediabetes  >or=6.5%  Consistent with diabetes  High levels of fetal hemoglobin interfere with the HbA1C  assay. Heterozygous hemoglobin variants (HbS, HgC, etc)do  not significantly interfere with this assay.   However, presence of multiple variants may affect accuracy.     01/23/2018 6.9 (H) 4.0 - 5.6 % Final     Comment:     Dr. Philippe Aquino(Endocrinology)   10/22/2013 7.4 (H) 4.5 - 6.2 % Final     Patient Active Problem List   Diagnosis    Major depressive disorder, recurrent episode, mild    Hypertension    Type 2 diabetes, uncontrolled, with retinopathy    CAD, multiple vessel    S/P CABG x 2    Macular degeneration    Obstructive sleep apnea  "on CPAP    Anxiety state, unspecified    Insulin long-term use    Primary osteoarthritis of both knees    Chronic low back pain    DJD (degenerative joint disease), lumbar    Type 2 diabetes, uncontrolled, with neuropathy    Bilateral carotid artery disease    Hyperlipidemia LDL goal <100    Type 2 diabetes, uncontrolled, with peripheral circulatory disorder    COPD (chronic obstructive pulmonary disease)    Atherosclerosis of abdominal aorta    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 18 g 0    BD INSULIN PEN NEEDLE UF SHORT 31 gauge x 5/16" Ndle       BD INSULIN SYRINGE ULTRA-FINE 1/2 mL 31 gauge x 15/64" Syrg       carvedilol (COREG) 25 MG tablet Take 1 tablet (25 mg total) by mouth 2 (two) times daily. 180 tablet 1    cinnamon bark 500 mg capsule Take 1,000 mg by mouth once daily.        clopidogrel (PLAVIX) 75 mg tablet Take 75 mg by mouth once daily.      donepezil (ARICEPT) 10 MG tablet Take 1 tablet (10 mg total) by mouth every evening. 30 tablet 11    DULoxetine (CYMBALTA) 30 MG capsule Take 1 capsule (30 mg total) by mouth once daily. 90 capsule 1    furosemide (LASIX) 40 MG tablet Take 40 mg by mouth once daily.       gabapentin (NEURONTIN) 300 MG capsule TAKE 2 CAPSULES BY MOUTH 3 TIMES DAILY 540 capsule 1    glimepiride (AMARYL) 4 MG tablet Take 4 mg by mouth daily with breakfast.       insulin NPH-insulin regular, 70/30, " "(NOVOLIN 70/30) 100 unit/mL (70-30) injection Inject into the skin. Inject 5 units at breakfast and inject 5 units at night      insulin syringe-needle U-100 1/2 mL 31 x 5/16" Syrg       losartan (COZAAR) 100 MG tablet TAKE 1 TABLET BY MOUTH ONCE DAILY 90 tablet 1    meclizine (ANTIVERT) 12.5 mg tablet Take 2 tablets (25 mg total) by mouth 3 (three) times daily as needed for Dizziness. 45 tablet 4    metformin (GLUCOPHAGE) 500 MG tablet Take 1,000 mg by mouth 2 (two) times daily with meals. 2 Tablets in the morning, 2 Tablets in the evening      nitroGLYCERIN (NITROSTAT) 0.3 MG SL tablet PLACE 1 TABLET UNDER TONGUE AS NEEDED FOR CHEST PAIN EVERY 5 MINUTES, UP TO 3 DOSES IN 15MINUTES 100 tablet 0    OM-3/DHA/EPA/FISH OIL/VIT D3 (FISH OIL-VIT D3 ORAL) Take 2,000 Units by mouth once daily.       pravastatin (PRAVACHOL) 20 MG tablet Take 20 mg by mouth once daily.      rivastigmine tartrate (EXELON) 1.5 MG capsule Take 1 capsule (1.5 mg total) by mouth 2 (two) times daily. 180 capsule 3    spironolactone (ALDACTONE) 25 MG tablet 25 mg once daily.       VIT C/VIT E AC/LUT/COPPER/ZINC (PRESERVISION LUTEIN ORAL) Take by mouth.      warfarin (COUMADIN) 5 MG tablet Take 2 tabs by mouth on Tuesday,Thursday, Saturday and Sundays then 1.5 on all other days.      [DISCONTINUED] diazepam (VALIUM) 2 MG tablet Take 2 mg by mouth continuous prn.       No current facility-administered medications on file prior to visit.      Review of patient's allergies indicates:   Allergen Reactions    Codeine Nausea Only     weak     Past Surgical History:   Procedure Laterality Date    broken elbow      left    CARPAL METACARPAL INTERPOSITIONAL ARTHROPLASTY-THUMB Right 11/22/2017    [facility]    COLONOSCOPY N/A 10/4/2017    [facility] Gabriel [facility]ung [facility] at [facility] ENDO    EYE SURGERY      cataract    heart bypass      2004    HERNIA REPAIR      right    RECONSTRUCTION-TENDON-FLEXOR " CARPI RADIAS Right 2017    [facility]    ROTATOR CUFF REPAIR      right       Family History   Problem Relation Age of Onset    Arthritis Mother     Diabetes Mother     Stroke Mother     Heart attack Father     Cancer Brother     Throat cancer Brother     Diabetes Maternal Aunt     Diabetes Maternal Uncle     Heart disease Maternal Uncle     Diabetes Paternal Aunt     Heart disease Paternal Aunt     Heart disease Paternal Uncle     Heart disease Maternal Grandmother     Cancer Maternal Grandfather      Social History     Socioeconomic History    Marital status:      Spouse name: Not on file    Number of children: 4    Years of education: GED    Highest education level: Not on file   Social Needs    Financial resource strain: Not on file    Food insecurity - worry: Not on file    Food insecurity - inability: Not on file    Transportation needs - medical: Not on file    Transportation needs - non-medical: Not on file   Occupational History    Occupation: retired tug    Tobacco Use    Smoking status: Former Smoker     Packs/day: 3.00     Years: 45.00     Pack years: 135.00     Types: Cigarettes     Last attempt to quit: 2004     Years since quittin.9    Smokeless tobacco: Never Used   Substance and Sexual Activity    Alcohol use: Yes     Comment: was heavy drinker 35 years ago, rare use now    Drug use: No    Sexual activity: Yes     Partners: Female   Other Topics Concern    Not on file   Social History Narrative    Not on file     Review of Systems   Constitution: Negative for chills, fever and weakness.   Cardiovascular: Negative for claudication and leg swelling.   Respiratory: Positive for cough. Negative for shortness of breath.    Skin: Positive for dry skin and nail changes. Negative for itching and rash.   Musculoskeletal: Positive for arthritis, back pain and joint pain. Negative for falls, joint  "swelling and muscle weakness.   Gastrointestinal: Negative for diarrhea, nausea and vomiting.   Neurological: Positive for numbness and paresthesias. Negative for tremors.   Psychiatric/Behavioral: Negative for altered mental status and hallucinations.           Objective:       Vitals:    03/18/19 1047   BP: (!) 140/80   Weight: 85.5 kg (188 lb 7.9 oz)   Height: 5' 6" (1.676 m)   PainSc:   8   PainLoc: Foot       Physical Exam   Constitutional:   General: Pt. is well-developed, well-nourished, appears stated age, in no acute distress, alert and oriented x 3. No evidence of depression, anxiety, or agitation. Calm, cooperative, and communicative. Appropriate interactions and affect.       Cardiovascular:   Pulses:       Dorsalis pedis pulses are 2+ on the right side, and 2+ on the left side.        Posterior tibial pulses are 1+ on the right side, and 1+ on the left side.   There is decreased digital hair.   Musculoskeletal:        Right foot: There is normal range of motion.        Left foot:  There is normal range of motion.   Muscle strength is 5/5 in all groups bilaterally.    Decreased stride, station of gait.  apropulsive toe off.  Increased angle and base of gait.    Patient has hammertoes of digits 2-5 bilateral partially reducible without symptom today.    Visible and palpable bunion without pain at dorsomedial 1st metatarsal head right and left.  Hallux abducted right and left partially reducible, tracks laterally without being track bound.  No ecchymosis, erythema, edema, or cardinal signs infection or signs of trauma same foot.     Neurological: A sensory deficit is present.   Drury-Keon 5.07 monofilamant testing is diminished Farhad feet. Sharp/dull sensation diminished Bilaterally   Skin: Skin is warm, dry and intact. No abrasion, no ecchymosis, no lesion and no rash noted. He is not diaphoretic. No cyanosis or erythema. No pallor. Nails show no clubbing.   Toenails 1-5 bilaterally are elongated by " 2-3 mm, thickened by 2-3 mm, discolored/yellowed, dystrophic, brittle with subungual debris.    Focal hyperkeratotic lesion consisting entirely of hyperkeratotic tissue without open skin, drainage, pus, fluctuance, malodor, or signs of infection: medial IPJ of hallux concha    Interdigital Spaces clean, dry and without evidence of break in skin integrity   Psychiatric: He has a normal mood and affect. His speech is normal.   Nursing note and vitals reviewed.        Assessment:       Encounter Diagnoses   Name Primary?    Type 2 diabetes, uncontrolled, with peripheral circulatory disorder Yes    Type II diabetes mellitus with neurological manifestations     Onychomycosis due to dermatophyte     Corn or callus          Plan:       Percy was seen today for diabetes mellitus, diabetic foot exam, nail care and poor circulation.    Diagnoses and all orders for this visit:    Type 2 diabetes, uncontrolled, with peripheral circulatory disorder    Type II diabetes mellitus with neurological manifestations    Onychomycosis due to dermatophyte    Corn or callus      I counseled the patient on his conditions, their implications and medical management.      - Shoe inspection. Diabetic Foot Education. Patient reminded of the importance of good nutrition and blood sugar control to help prevent podiatric complications of diabetes. Patient instructed on proper foot hygeine. We discussed wearing proper shoe gear, daily foot inspections, never walking without protective shoe gear, never putting sharp instruments to feet    - With patient's permission, nails were aggressively reduced and debrided x 10 to their soft tissue attachment mechanically and with electric , removing all offending nail and debris. Patient relates relief following the procedure.    - After cleansing the  area w/ alcohol prep pad the above mentioned hyperkeratosis was trimmed utilizing No 15 scapel, to a smooth base with out incident. Patient tolerated  this  well and reported comfort to the area of medial hallux IPJ concha    - He will continue to monitor the areas daily, inspect his feet, wear protective shoe gear when ambulatory, moisturizer to maintain skin integrity and follow in this office in approximately 60-70 days, sooner PRN

## 2019-03-20 NOTE — TELEPHONE ENCOUNTER
Left voice message, please call the office to inform staff of which medications he will need to be send to the mail order pharmacy.

## 2019-03-20 NOTE — PROGRESS NOTES
Percy Ramirez  was seen as a follow up.  Our last encounter was 6/23/2014.    CHIEF COMPLAINT:  Results (abnormal ct scan of lung)      HISTORY OF PRESENT ILLNESS: Percy Ramirez is a 78 y.o. male with pmh of tobacco abuse, cad, tyler, htn, dyslipidemia, dm2 is here for pulmonary evaluation.  Our first encounter was 1/24/13.  At that time, patient with chronic dyspnea.  However, patient stated symptoms have gradually worsen since 2012.  +pelletier x 1/2.  No fever/chills.  No wheezing.  No coughing.  No orthopnea.  No pnd.  Patient with h/o 3 ppd x 40 years.  Quit smoking 2004.  Patient s/p lung volume 1/31/14 with dlco 46%.      Patient was prescribed spiriva.  However, patient did not filled due to cost.  Currently on albuterol.  Patient underwent LDCT screening 2/11/19 by Dr. Edmondson.  CT demonstrated several ggo.  Patient was referred back to pulmonary for further inputs.  Patient denied hemoptysis.  Still with significant dyspnea.        PAST MEDICAL HISTORY:    Active Ambulatory Problems     Diagnosis Date Noted    Major depressive disorder, recurrent episode, mild     Hypertension     Type 2 diabetes, uncontrolled, with retinopathy     CAD, multiple vessel     S/P CABG x 2     Macular degeneration     Obstructive sleep apnea on CPAP 07/27/2012    Anxiety state, unspecified 10/24/2013    Insulin long-term use 02/16/2015    Primary osteoarthritis of both knees 02/25/2015    Chronic low back pain 02/25/2015    DJD (degenerative joint disease), lumbar 04/30/2015    Type 2 diabetes, uncontrolled, with neuropathy 07/29/2016    Bilateral carotid artery disease 07/29/2016    Hyperlipidemia LDL goal <100 07/29/2016    Type 2 diabetes, uncontrolled, with peripheral circulatory disorder 07/29/2016    COPD (chronic obstructive pulmonary disease) 07/29/2016    Atherosclerosis of abdominal aorta 07/29/2016    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 07/29/2016    Overweight  (BMI 25.0-29.9) 07/29/2016    Paroxysmal atrial fibrillation 03/06/2017    Chronic stable angina 03/06/2017    Senile purpura 03/06/2017    Osteoarthritis of carpometacarpal (CMC) joint of left thumb 11/22/2017    MCI (mild cognitive impairment) 06/18/2018    Chronic vertigo 02/11/2019    Pulmonary nodule 03/20/2019    Dyspnea on exertion 03/20/2019     Resolved Ambulatory Problems     Diagnosis Date Noted    Combined hyperlipidemia associated with type 2 diabetes mellitus     Fatigue 07/27/2012    Depressive disorder, not elsewhere classified 10/24/2013    Arrhythmia 12/30/2013    Dizziness of unknown cause 12/30/2013    Hypotension 06/13/2014    Hyponatremia 06/14/2014    Diabetic retinopathy of both eyes 10/23/2014    Diabetic neuropathy 02/25/2015    Poor motor control of trunk 03/11/2015    IT band syndrome 03/11/2015    Impairment of balance 03/11/2015    Colon cancer screening 10/04/2017     Past Medical History:   Diagnosis Date    Allergy     Arthritis     CAD, multiple vessel     Cataract     Depression     Hyperlipidemia     Hypertension     Macular degeneration     S/P CABG x 2     Type 2 diabetes mellitus with circulatory disorder                 PAST SURGICAL HISTORY:    Past Surgical History:   Procedure Laterality Date    broken elbow      left    CARPAL METACARPAL INTERPOSITIONAL ARTHROPLASTY-THUMB Right 11/22/2017    [facility] OR    COLONOSCOPY N/A 10/4/2017    [facility] ENDO    EYE SURGERY      cataract    heart bypass      2004    HERNIA REPAIR      right    RECONSTRUCTION-TENDON-FLEXOR CARPI RADIAS Right 11/22/2017    [facility] OR    ROTATOR CUFF REPAIR      right  2004         FAMILY HISTORY:                Family History   Problem Relation Age of Onset    Arthritis Mother     Diabetes Mother     Stroke Mother     Heart attack Father     Cancer  "Brother     Throat cancer Brother     Diabetes Maternal Aunt     Diabetes Maternal Uncle     Heart disease Maternal Uncle     Diabetes Paternal Aunt     Heart disease Paternal Aunt     Heart disease Paternal Uncle     Heart disease Maternal Grandmother     Cancer Maternal Grandfather        SOCIAL HISTORY:          Tobacco:   Social History     Tobacco Use   Smoking Status Former Smoker    Packs/day: 3.00    Years: 45.00    Pack years: 135.00    Types: Cigarettes    Last attempt to quit: 2004    Years since quittin.9   Smokeless Tobacco Never Used       alcohol use:    Social History     Substance and Sexual Activity   Alcohol Use Yes    Comment: was heavy drinker 35 years ago, rare use now                 Occupation:  Retired tug boat captain    ALLERGIES:    Review of patient's allergies indicates:   Allergen Reactions    Codeine Nausea Only     weak    Ranexa [ranolazine]        CURRENT MEDICATIONS:    Current Outpatient Medications   Medication Sig Dispense Refill    albuterol (PROVENTIL/VENTOLIN HFA) 90 mcg/actuation inhaler Inhale 2 puffs into the lungs every 6 (six) hours as needed for Wheezing or Shortness of Breath. 18 g 0    BD INSULIN PEN NEEDLE UF SHORT 31 gauge x 5/16" Ndle       BD INSULIN SYRINGE ULTRA-FINE 1/2 mL 31 gauge x 15/64" Syrg       carvedilol (COREG) 25 MG tablet Take 1 tablet (25 mg total) by mouth 2 (two) times daily. 180 tablet 1    cinnamon bark 500 mg capsule Take 1,000 mg by mouth once daily.        clopidogrel (PLAVIX) 75 mg tablet Take 75 mg by mouth once daily.      donepezil (ARICEPT) 10 MG tablet Take 1 tablet (10 mg total) by mouth every evening. 30 tablet 11    DULoxetine (CYMBALTA) 30 MG capsule Take 1 capsule (30 mg total) by mouth once daily. 90 capsule 1    furosemide (LASIX) 40 MG tablet Take 40 mg by mouth once daily.       gabapentin (NEURONTIN) 300 MG capsule TAKE 2 CAPSULES BY MOUTH 3 TIMES DAILY 540 capsule 1    glimepiride " "(AMARYL) 4 MG tablet Take 4 mg by mouth daily with breakfast.       insulin NPH-insulin regular, 70/30, (NOVOLIN 70/30) 100 unit/mL (70-30) injection Inject into the skin. Inject 5 units at breakfast and inject 5 units at night      insulin syringe-needle U-100 1/2 mL 31 x 5/16" Syrg       losartan (COZAAR) 100 MG tablet TAKE 1 TABLET BY MOUTH ONCE DAILY 90 tablet 1    meclizine (ANTIVERT) 12.5 mg tablet Take 2 tablets (25 mg total) by mouth 3 (three) times daily as needed for Dizziness. 45 tablet 4    metformin (GLUCOPHAGE) 500 MG tablet Take 1,000 mg by mouth 2 (two) times daily with meals. 2 Tablets in the morning, 2 Tablets in the evening      nitroGLYCERIN (NITROSTAT) 0.3 MG SL tablet PLACE 1 TABLET UNDER TONGUE AS NEEDED FOR CHEST PAIN EVERY 5 MINUTES, UP TO 3 DOSES IN 15MINUTES 100 tablet 0    OM-3/DHA/EPA/FISH OIL/VIT D3 (FISH OIL-VIT D3 ORAL) Take 2,000 Units by mouth once daily.       pravastatin (PRAVACHOL) 20 MG tablet Take 20 mg by mouth once daily.      rivastigmine tartrate (EXELON) 1.5 MG capsule Take 1 capsule (1.5 mg total) by mouth 2 (two) times daily. 180 capsule 3    spironolactone (ALDACTONE) 25 MG tablet 25 mg once daily.       VIT C/VIT E AC/LUT/COPPER/ZINC (PRESERVISION LUTEIN ORAL) Take by mouth.      warfarin (COUMADIN) 5 MG tablet Take 2 tabs by mouth on Tuesday,Thursday, Saturday and Sundays then 1.5 on all other days.      warfarin (COUMADIN) 7.5 MG tablet Take by mouth.       No current facility-administered medications for this visit.                   REVIEW OF SYSTEMS:     Pulmonary related symptoms as per HPI.  Gen:  no weight loss, no fever, no night sweat  HEENT:  + visual disturbance from macular degenration, no sore throat, + hearing acuity  CV:  No chest pain, no orthopnea, no PND  GI:  no melena, no hematochezia, no diarhea, no constipation.  :  no dysuria, no hematuria, no hesistancy, no dribbling  Neuro:  no syncope, no vertigo, no tinitus  Psych:  No " "homocide or suicide ideation; no depression.  Endocrine:  No heat or cold intolerance.  Sleep:  No snoring; no witnessed apnea.  Otherwise, a balance of systems reviewed is negative.                 PHYSICAL EXAM:  Vitals:    03/20/19 0930   BP: (!) 166/88   Pulse: (!) 57   Temp: 97.5 °F (36.4 °C)   TempSrc: Oral   SpO2: (!) 93%   Weight: 85.9 kg (189 lb 4.8 oz)   Height: 5' 6" (1.676 m)   PainSc: 10-Worst pain ever   PainLoc: Knee     Body mass index is 30.55 kg/m².     GENERAL:  well develop; no apparent distress  HEENT:  no nasal congestion; no discharge noted; class 2 modified mallampatti.   NECK:  supple; no palpable masses.  CARDIO: regular rate and rhythm  PULM:  clear to auscultation bilaterally; no intercostals retractions; no accessory muscle usage   ABDOMEN:  soft nontender/nondistended.  +bowel sound  EXTREMITIES no cce  NEURO:  CN II-XII intact.  5/5 motor in all extremities.  sensation grossly intact   to light touch.  PSYCH:  normal affect.  Alert and oriented x 4    LABS  Pulmonary Functions Testing Results:  1/31/13 tlc 93%; dlco 46%  4/8/14 ratio 67%; fvc 76%; fve 73%  ABG: none  CXR:   Lungs clear  CT CHEST:  2/15/18 rll ggo and tib in lingula.    PPD none        2-d echo 12/30/11  1.  The right atrium and right ventricle are of normal dimension.            2.  Tricuspid regurgitation is mild.                                         3.  PA pressure 28-30 mmHg.                                                  4.  Enlargement of the left atrium.                                          5.  Normal mitral valve structure and valve excursion.  No mitral            stenosis.                                                                     No MR detected from this study.                                             6.  Normal left ventricular dimensions and systolic function, ejection       fraction 50%-55%.                                                            7.  Grade I left ventricular " diastolic dysfunction.                          8.  Mild aortic sclerosis with good valve excursion.  No aortic stenosis.    Minimal aortic regurgitation.                                                9.  No pericardial effusion seen.      bnp 8/5/10 134;1/24/13 164    6 min walk 350 meters; no significant desaturation    ASSESSMENT  Problem List Items Addressed This Visit     Dyspnea on exertion    Overview     multiple etiologies.  Cardiac (diastolic dysfunction, pulmonary htn) +/- copd +/- decondition.  Patient did not fill spiriva due to cost.  Cont with albuterol prn.  Patient quit smoking 2004.           Obstructive sleep apnea on CPAP    Overview     Doing well with cpap of 11 cm H20.           Pulmonary nodule    Overview     Ct with 0.5 mm rll ggo.  Repeat ct in 1 year.           Relevant Orders    CT Chest Without Contrast           Patient will No Follow-up on file.

## 2019-03-21 RX ORDER — CARVEDILOL 25 MG/1
25 TABLET ORAL 2 TIMES DAILY
Qty: 180 TABLET | Refills: 1 | Status: SHIPPED | OUTPATIENT
Start: 2019-03-21 | End: 2019-08-13 | Stop reason: SDUPTHER

## 2019-03-21 RX ORDER — LOSARTAN POTASSIUM 100 MG/1
100 TABLET ORAL DAILY
Qty: 90 TABLET | Refills: 1 | Status: SHIPPED | OUTPATIENT
Start: 2019-03-21 | End: 2019-08-09 | Stop reason: SDUPTHER

## 2019-03-21 RX ORDER — DIAZEPAM 2 MG/1
2 TABLET ORAL CONTINUOUS PRN
OUTPATIENT
Start: 2019-03-21

## 2019-03-21 NOTE — TELEPHONE ENCOUNTER
Spoke with the pt and he is requesting a refill on these medications:Coreg and Losartan. [facility] mail order.  Pt is asking for a refill on the Valium 2 mg, I told the pt this is not a medication that  prescribed.  He states the other doctor won't fill his Valium.  Pt states  told him, she would fill this medication; not that many pills?  Please advise

## 2019-03-21 NOTE — TELEPHONE ENCOUNTER
----- Message from Yrn Villalobos sent at 3/21/2019  9:44 AM CDT -----  Contact: Self/[phone]  Type:  Patient Returning Call    Who Called:  Patient    Who Left Message for Patient: Julissa    Does the patient know what this is regarding?: Patient stated that he has so many medications that he does not know which medications he need. Thank you.    Best Call Back Number:  [phone]

## 2019-03-25 ENCOUNTER — TELEPHONE (OUTPATIENT)
Dept: NEUROLOGY | Facility: CLINIC | Age: 79
End: 2019-03-25

## 2019-03-25 NOTE — TELEPHONE ENCOUNTER
"----- Message from Junie Barriga sent at 3/25/2019  9:40 AM CDT -----  Contact: JAILENE JIMENES [[id number]]  Name of Who is Calling: JAILENE JIMENES [[id number]]      What is the request in detail:  Patient called requesting to make this office aware that he's experiencing extreme side effect from the prescribed medication  donepezil (ARICEPT) 10 MG tablet. Patient says, "he's vomiting and having diarrhea all at the same time."  Please give patient a call back at your earliest convenience.      THANKS!      Can the clinic reply by [facility]: no      Number to Call Back: JAILENE JIMENES / # [phone]      Left message to call office                                     "

## 2019-03-26 DIAGNOSIS — H81.392 PERIPHERAL VERTIGO INVOLVING LEFT EAR: ICD-10-CM

## 2019-03-26 RX ORDER — MECLIZINE HCL 12.5 MG 12.5 MG/1
12.5 TABLET ORAL 3 TIMES DAILY PRN
Qty: 252 TABLET | Refills: 1 | Status: SHIPPED | OUTPATIENT
Start: 2019-03-26 | End: 2019-03-28 | Stop reason: SDUPTHER

## 2019-03-28 DIAGNOSIS — H81.392 PERIPHERAL VERTIGO INVOLVING LEFT EAR: ICD-10-CM

## 2019-03-29 ENCOUNTER — TELEPHONE (OUTPATIENT)
Dept: NEUROLOGY | Facility: CLINIC | Age: 79
End: 2019-03-29

## 2019-03-29 DIAGNOSIS — J44.1 COPD EXACERBATION: ICD-10-CM

## 2019-03-29 RX ORDER — ALBUTEROL SULFATE 90 UG/1
AEROSOL, METERED RESPIRATORY (INHALATION)
Qty: 18 EACH | Refills: 0 | Status: SHIPPED | OUTPATIENT
Start: 2019-03-29 | End: 2019-05-29 | Stop reason: SDUPTHER

## 2019-03-29 NOTE — TELEPHONE ENCOUNTER
----- Message from Yrn Villalobos sent at 3/29/2019  7:36 AM CDT -----  Contact: Self/[phone]  Type: Patient Call Back    Who called: Patient     What is the request in detail: Patient would like to speak to the staff about his medication:  donepezil (ARICEPT) 10 MG tablet. Thank you.    Can the clinic reply by [facility]? No    Would the patient rather a call back or a response via [facility]? Call Back    Best call back number: [phone]    Informed patient to stop aricept as he was instructed to do on Monday

## 2019-04-01 RX ORDER — MECLIZINE HCL 12.5 MG 12.5 MG/1
12.5 TABLET ORAL 3 TIMES DAILY PRN
Qty: 252 TABLET | Refills: 1 | Status: SHIPPED | OUTPATIENT
Start: 2019-04-01 | End: 2021-02-23 | Stop reason: SDUPTHER

## 2019-04-12 ENCOUNTER — TELEPHONE (OUTPATIENT)
Dept: FAMILY MEDICINE | Facility: CLINIC | Age: 79
End: 2019-04-12

## 2019-04-12 NOTE — TELEPHONE ENCOUNTER
Spoke with patient with charted recommendation on process of getting oxygen removed from the home. Patient schedule nurse visit for this on 4/15/19.

## 2019-04-12 NOTE — TELEPHONE ENCOUNTER
----- Message from Irina Nunez sent at 4/12/2019 10:44 AM CDT -----  Contact: Self   Type: Patient Call Back    Who called:Self     What is the request in detail:patient says he is on oxygen and he is no longer using it and would like for his tanks to be picked up.     Can the clinic reply by [facility]? NO     Would the patient rather a call back or a response via [facility]?  Call     Best call back number:[phone]

## 2019-04-12 NOTE — TELEPHONE ENCOUNTER
In order for us to remove oxygen from the home we have to assess objectively that he no longer needs it by:  1. Having him come for nurse visit to check O2 sat on room air at rest and exertion; if this is >88% we need to:  2. Do an overnight oxygen test (home test) to see if he goes <88% while sleeping.

## 2019-04-15 ENCOUNTER — CLINICAL SUPPORT (OUTPATIENT)
Dept: FAMILY MEDICINE | Facility: CLINIC | Age: 79
End: 2019-04-15
Payer: MEDICARE

## 2019-04-15 VITALS — OXYGEN SATURATION: 88 %

## 2019-04-15 DIAGNOSIS — R79.81 ABNORMAL PULSE OXIMETRY: Primary | ICD-10-CM

## 2019-04-15 DIAGNOSIS — J44.9 CHRONIC OBSTRUCTIVE PULMONARY DISEASE, UNSPECIFIED COPD TYPE: ICD-10-CM

## 2019-04-15 LAB
CHOL/HDLC RATIO: 3
CHOLESTEROL, TOTAL: 99
HBA1C MFR BLD: 7.2 % (ref 4–5.6)
HDLC SERPL-MCNC: 33 MG/DL
LDLC SERPL CALC-MCNC: 50 MG/DL
NONHDLC SERPL-MCNC: 66 MG/DL
TRIGL SERPL-MCNC: 82 MG/DL

## 2019-04-15 PROCEDURE — 99499 UNLISTED E&M SERVICE: CPT | Mod: S$GLB,,, | Performed by: INTERNAL MEDICINE

## 2019-04-15 PROCEDURE — 99499 NO LOS: ICD-10-PCS | Mod: S$GLB,,, | Performed by: INTERNAL MEDICINE

## 2019-04-15 NOTE — PROGRESS NOTES
Patient came in for O2 sat check. Sitting at rest in lobby O2 sat 96% on room air. While ambulating in hallway sats dropped from 96% and dipped as low as 88% room air, but quickly rebounded to 90-92%. Patient reports that he has not worn O2 in approx 2 months and that he has his own personal pulse oximeter to monitor his levels.

## 2019-04-15 NOTE — PROGRESS NOTES
Oxygen levels noted - borderline but ok while awake.  We will need to get overnight pulse oximetry to see if he needs it then before oxygen can be removed from the home.

## 2019-04-22 ENCOUNTER — TELEPHONE (OUTPATIENT)
Dept: FAMILY MEDICINE | Facility: CLINIC | Age: 79
End: 2019-04-22

## 2019-04-22 NOTE — TELEPHONE ENCOUNTER
----- Message from Raven Sierralexacha sent at 4/22/2019  4:24 PM CDT -----  Contact: Self   Type: Patient Call Back    Who called:self     What is the request in detail: Would like for the office to check into his records to see if he ever had a injection in 1942 for measles .     Can the clinic reply by [facility]? Call     Would the patient rather a call back or a response via [facility]? Call     Best call back number: [phone].    Additional Information:

## 2019-04-26 ENCOUNTER — TELEPHONE (OUTPATIENT)
Dept: ADMINISTRATIVE | Facility: HOSPITAL | Age: 79
End: 2019-04-26

## 2019-05-02 ENCOUNTER — TELEPHONE (OUTPATIENT)
Dept: FAMILY MEDICINE | Facility: CLINIC | Age: 79
End: 2019-05-02

## 2019-05-02 NOTE — TELEPHONE ENCOUNTER
Spoke with patient and he said that he was instructed to record oxygen reading and report to provider. Patient said that during the day he does not use his oxygen and his sat ranges 93-97% with minium activies. He said at at night his sat ranges 94-98% with oxygen machine attached to his CPAP machine.

## 2019-05-02 NOTE — TELEPHONE ENCOUNTER
I appreciate the information.  However, I  believe the patient was requesting oxygen to be removed from his home. In order to do so we need an in-office documentation of his oxygen level on RA at rest and with exertion. If this is ok we will need [facility] to record his overnight oxygen level off oxygen to make sure he does not go below 88% while asleep.

## 2019-05-02 NOTE — TELEPHONE ENCOUNTER
----- Message from Tasia Melissa sent at 5/2/2019 11:05 AM CDT -----  Contact: Self  Type: Patient Call Back    What is the request in detail: Pt calling to speak to a nurse regarding his oxygen level 94-98    Can the clinic reply by [facility]? No    Would the patient rather a call back or a response via [facility]? Call    Best call back number: [phone]

## 2019-05-03 NOTE — TELEPHONE ENCOUNTER
Spoke with rep with [employer] and they informed me that insurance will not pay for this to be done at home. Patient will have to have this done in a in-patient facility. Provider notified of this. Attempt to contact patient to see if he would be willing to go to an in-patient facility.Left message on answering machine to return call to clinic.

## 2019-05-03 NOTE — TELEPHONE ENCOUNTER
I ordered for him to have overnight pulse ox based on the findings that day. Has this been scheduled yet?

## 2019-05-07 DIAGNOSIS — F33.0 MAJOR DEPRESSIVE DISORDER, RECURRENT EPISODE, MILD: ICD-10-CM

## 2019-05-07 RX ORDER — DULOXETIN HYDROCHLORIDE 30 MG/1
30 CAPSULE, DELAYED RELEASE ORAL DAILY
Qty: 90 CAPSULE | Refills: 1 | Status: SHIPPED | OUTPATIENT
Start: 2019-05-07 | End: 2019-12-11 | Stop reason: SDUPTHER

## 2019-05-07 RX ORDER — DIAZEPAM 2 MG/1
2 TABLET ORAL CONTINUOUS PRN
Refills: 1 | OUTPATIENT
Start: 2019-05-07

## 2019-05-07 RX ORDER — PRAVASTATIN SODIUM 20 MG/1
20 TABLET ORAL DAILY
Qty: 90 TABLET | Refills: 1 | Status: SHIPPED | OUTPATIENT
Start: 2019-05-07 | End: 2019-11-06 | Stop reason: SDUPTHER

## 2019-05-07 NOTE — TELEPHONE ENCOUNTER
Patient notified that Valium was not reordered. Patient said that he will address this on his next office.

## 2019-05-08 ENCOUNTER — TELEPHONE (OUTPATIENT)
Dept: FAMILY MEDICINE | Facility: CLINIC | Age: 79
End: 2019-05-08

## 2019-05-08 DIAGNOSIS — G47.33 OBSTRUCTIVE SLEEP APNEA ON CPAP: Primary | ICD-10-CM

## 2019-05-08 NOTE — TELEPHONE ENCOUNTER
In- patient order placed for patient to have testing done at the  sleep lab overnight.  Spoke with the pt, and informed him of the above details.  Patient verbalized understandings.

## 2019-05-08 NOTE — TELEPHONE ENCOUNTER
----- Message from Josefa Buckley LPN sent at 5/7/2019  3:36 PM CDT -----   Spoke with patient and he agree to have this as in-patient  ----- Message -----  From: Kasie Edmondson MD  Sent: 5/3/2019   5:50 PM  To: Delvis JACOB Staff    Spoke with rep with [facility] and they informed me that insurance will not pay for this to be done at home. Patient will have to have this done in a in-patient facility. Provider notified of this. Attempt to contact patient to see if he would be willing to go to an in-patient facility.Left message on answering machine to return call to clinic.

## 2019-05-09 ENCOUNTER — TELEPHONE (OUTPATIENT)
Dept: FAMILY MEDICINE | Facility: CLINIC | Age: 79
End: 2019-05-09

## 2019-05-15 ENCOUNTER — TELEPHONE (OUTPATIENT)
Dept: FAMILY MEDICINE | Facility: CLINIC | Age: 79
End: 2019-05-15

## 2019-05-15 NOTE — TELEPHONE ENCOUNTER
----- Message from eKlly Pride sent at 5/15/2019  9:05 AM CDT -----  Contact: Patient  Type: Patient Call Back    Who called: Patient     What is the request in detail: Pt is requesting a call back in ref to his referral for sleep study. Pt states that [facility] doesn't have anything.      Can the clinic reply by [facility]? No     Would the patient rather a call back or a response via [facility]?  Call back     Best call back number: [phone]    Additional Information:

## 2019-05-15 NOTE — TELEPHONE ENCOUNTER
Spoke with the pt and I informed him that the referral was placed on 5/8/19.  I told him that the status is pending.  Patient verbalized understandings.

## 2019-05-15 NOTE — TELEPHONE ENCOUNTER
----- Message from Julianne Becerra sent at 5/14/2019  3:15 PM CDT -----  Contact: pt  .Type: Patient Call Back    Who called:pt     What is the request in detail:pt wants to know what the status of the sleep referral    Can the clinic reply by [facility]?no    Would the patient rather a call back or a response via [facility]? Call up    Best call back number:[phone]    Additional Information:n/a

## 2019-05-16 ENCOUNTER — TELEPHONE (OUTPATIENT)
Dept: FAMILY MEDICINE | Facility: CLINIC | Age: 79
End: 2019-05-16

## 2019-05-16 NOTE — TELEPHONE ENCOUNTER
Spoke with pt informed orders have been printed and faxed to [employer] [facility].  Pt verbalized understanding.

## 2019-05-16 NOTE — TELEPHONE ENCOUNTER
----- Message from Rachael Vicente sent at 5/16/2019  8:38 AM CDT -----  Contact: Cody  Type: Patient Call Back    Who called:Nataly    What is the request in detail: Dilip stated that patient is requesting sleep study and a medical necessity form, orders, and clinicals are needed for prior auth to be complete. Documents need to be faxed to [phone]. Nataly stated if sleep study is not needed, please reach out to notify the patient.    Can the clinic reply by [facility]?    Would the patient rather a call back or a response via [facility]? Call    Best call back number: [phone]    Additional Information:

## 2019-05-21 ENCOUNTER — OFFICE VISIT (OUTPATIENT)
Dept: PODIATRY | Facility: CLINIC | Age: 79
End: 2019-05-21
Payer: MEDICARE

## 2019-05-21 ENCOUNTER — TELEPHONE (OUTPATIENT)
Dept: PULMONOLOGY | Facility: CLINIC | Age: 79
End: 2019-05-21

## 2019-05-21 VITALS
BODY MASS INDEX: 30.37 KG/M2 | WEIGHT: 189 LBS | HEIGHT: 66 IN | DIASTOLIC BLOOD PRESSURE: 80 MMHG | SYSTOLIC BLOOD PRESSURE: 136 MMHG

## 2019-05-21 DIAGNOSIS — M20.42 HAMMER TOES OF BOTH FEET: ICD-10-CM

## 2019-05-21 DIAGNOSIS — M20.11 HALLUX ABDUCTO VALGUS, RIGHT: ICD-10-CM

## 2019-05-21 DIAGNOSIS — B35.1 ONYCHOMYCOSIS DUE TO DERMATOPHYTE: ICD-10-CM

## 2019-05-21 DIAGNOSIS — E11.49 TYPE II DIABETES MELLITUS WITH NEUROLOGICAL MANIFESTATIONS: ICD-10-CM

## 2019-05-21 DIAGNOSIS — L84 CORN OR CALLUS: ICD-10-CM

## 2019-05-21 DIAGNOSIS — M20.12 HALLUX ABDUCTO VALGUS, LEFT: ICD-10-CM

## 2019-05-21 DIAGNOSIS — M20.41 HAMMER TOES OF BOTH FEET: ICD-10-CM

## 2019-05-21 PROCEDURE — 99499 UNLISTED E&M SERVICE: CPT | Mod: S$GLB,,, | Performed by: PODIATRIST

## 2019-05-21 PROCEDURE — 11056 PARNG/CUTG B9 HYPRKR LES 2-4: CPT | Mod: Q9,S$GLB,, | Performed by: PODIATRIST

## 2019-05-21 PROCEDURE — 11721 PR DEBRIDEMENT OF NAILS, 6 OR MORE: ICD-10-PCS | Mod: 59,Q9,S$GLB, | Performed by: PODIATRIST

## 2019-05-21 PROCEDURE — 99999 PR PBB SHADOW E&M-EST. PATIENT-LVL III: CPT | Mod: PBBFAC,,, | Performed by: PODIATRIST

## 2019-05-21 PROCEDURE — 11721 DEBRIDE NAIL 6 OR MORE: CPT | Mod: 59,Q9,S$GLB, | Performed by: PODIATRIST

## 2019-05-21 PROCEDURE — 99499 NO LOS: ICD-10-PCS | Mod: S$GLB,,, | Performed by: PODIATRIST

## 2019-05-21 PROCEDURE — 11056 PR TRIM BENIGN HYPERKERATOTIC SKIN LESION,2-4: ICD-10-PCS | Mod: Q9,S$GLB,, | Performed by: PODIATRIST

## 2019-05-21 PROCEDURE — 99999 PR PBB SHADOW E&M-EST. PATIENT-LVL III: ICD-10-PCS | Mod: PBBFAC,,, | Performed by: PODIATRIST

## 2019-05-21 NOTE — TELEPHONE ENCOUNTER
----- Message from Amy Matthews sent at 5/21/2019  2:26 PM CDT -----  Contact: pt  Type: Patient Call Back    Who called:pt    What is the request in detail:pt is requesting sleep study order.his oxgyen is low. Call pt    Can the clinic reply by [facility]?    Would the patient rather a call back or a response via [facility]? Call back    Best call back number:[phone]    Additional Information:

## 2019-05-21 NOTE — PROGRESS NOTES
Subjective:      Patient ID: Percy Ramirez is a 78 y.o. male.    Chief Complaint: Diabetes Mellitus (bilateral great toe pain Pcp Dr. Edmondson  2/11/19); Diabetic Foot Exam; Nail Care; and Foot Pain    Percy is a 78 y.o. male who presents to the clinic for evaluation and treatment of high risk feet. Percy has a past medical history of Allergy, Arthritis, CAD, multiple vessel, Cataract, Depression, Hyperlipidemia, Hypertension, Macular degeneration, S/P CABG x 2, and Type 2 diabetes mellitus with circulatory disorder. The patient's chief complaint is elongated, thickened toenails aggravated by increased weight bearing, shoe gear, pressure.    This patient has documented high risk feet requiring routine maintenance secondary to diabetes mellitis and those secondary complications of diabetes, as mentioned..    PCP: Kasie Edmondson MD    Date Last Seen by PCP:   Chief Complaint   Patient presents with    Diabetes Mellitus     bilateral great toe pain Pcp Dr. Edmondson  2/11/19    Diabetic Foot Exam    Nail Care    Foot Pain     Current shoe gear:  rx shoes    Hemoglobin A1C   Date Value Ref Range Status   04/15/2019 7.2 (A) 4.0 - 5.6 % Corrected     Comment:     Dr. Philippe Aquino ( Endocrinology )   12/17/2018 7.6 (H) 4.0 - 5.6 % Final     Comment:     Dr. Philippe Aquino ( Endocrinology )   08/08/2018 7.2 (H) 4.0 - 5.6 % Final     Comment:     ADA Screening Guidelines:  5.7-6.4%  Consistent with prediabetes  >or=6.5%  Consistent with diabetes  High levels of fetal hemoglobin interfere with the HbA1C  assay. Heterozygous hemoglobin variants (HbS, HgC, etc)do  not significantly interfere with this assay.   However, presence of multiple variants may affect accuracy.     01/23/2018 6.9 (H) 4.0 - 5.6 % Final     Comment:     Dr. Philippe Aquino(Endocrinology)     Patient Active Problem List   Diagnosis    Major depressive disorder, recurrent episode, mild    Hypertension    Type 2 diabetes, uncontrolled, with  "retinopathy    CAD, multiple vessel    S/P CABG x 2    Macular degeneration    Obstructive sleep apnea on CPAP    Anxiety state, unspecified    Insulin long-term use    Primary osteoarthritis of both knees    Chronic low back pain    DJD (degenerative joint disease), lumbar    Type 2 diabetes, uncontrolled, with neuropathy    Bilateral carotid artery disease    Hyperlipidemia LDL goal <100    Type 2 diabetes, uncontrolled, with peripheral circulatory disorder    COPD (chronic obstructive pulmonary disease)    Atherosclerosis of abdominal aorta    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    Overweight (BMI 25.0-29.9)    Paroxysmal atrial fibrillation    Chronic stable angina    Senile purpura    Osteoarthritis of carpometacarpal (CMC) joint of left thumb    MCI (mild cognitive impairment)    Chronic vertigo    Pulmonary nodule    Dyspnea on exertion     Current Outpatient Medications on File Prior to Visit   Medication Sig Dispense Refill    albuterol (PROVENTIL/VENTOLIN HFA) 90 mcg/actuation inhaler INHALE 2 PUFFS BY MOUTH EVERY 6 HOURS AS NEEDED FOR WHEEZING AND FOR SHORTNESS OF BREATH 18 each 0    BD INSULIN PEN NEEDLE UF SHORT 31 gauge x 5/16" Ndle       BD INSULIN SYRINGE ULTRA-FINE 1/2 mL 31 gauge x 15/64" Syrg       carvedilol (COREG) 25 MG tablet Take 1 tablet (25 mg total) by mouth 2 (two) times daily. 180 tablet 1    cinnamon bark 500 mg capsule Take 1,000 mg by mouth once daily.        clopidogrel (PLAVIX) 75 mg tablet Take 75 mg by mouth once daily.      donepezil (ARICEPT) 10 MG tablet Take 1 tablet (10 mg total) by mouth every evening. 30 tablet 11    DULoxetine (CYMBALTA) 30 MG capsule Take 1 capsule (30 mg total) by mouth once daily. 90 capsule 1    furosemide (LASIX) 40 MG tablet Take 40 mg by mouth once daily.       gabapentin (NEURONTIN) 300 MG capsule TAKE 2 CAPSULES BY MOUTH 3 TIMES DAILY 540 capsule 1    glimepiride (AMARYL) " "4 MG tablet Take 4 mg by mouth daily with breakfast.       insulin NPH-insulin regular, 70/30, (NOVOLIN 70/30) 100 unit/mL (70-30) injection Inject into the skin. Inject 5 units at breakfast and inject 5 units at night      insulin syringe-needle U-100 1/2 mL 31 x 5/16" Syrg       losartan (COZAAR) 100 MG tablet Take 1 tablet (100 mg total) by mouth once daily. 90 tablet 1    meclizine (ANTIVERT) 12.5 mg tablet Take 1 tablet (12.5 mg total) by mouth 3 (three) times daily as needed for Dizziness. 252 tablet 1    metformin (GLUCOPHAGE) 500 MG tablet Take 1,000 mg by mouth 2 (two) times daily with meals. 2 Tablets in the morning, 2 Tablets in the evening      nitroGLYCERIN (NITROSTAT) 0.3 MG SL tablet PLACE 1 TABLET UNDER TONGUE AS NEEDED FOR CHEST PAIN EVERY 5 MINUTES, UP TO 3 DOSES IN 15MINUTES 100 tablet 0    OM-3/DHA/EPA/FISH OIL/VIT D3 (FISH OIL-VIT D3 ORAL) Take 2,000 Units by mouth once daily.       pravastatin (PRAVACHOL) 20 MG tablet Take 1 tablet (20 mg total) by mouth once daily. 90 tablet 1    rivastigmine tartrate (EXELON) 1.5 MG capsule Take 1 capsule (1.5 mg total) by mouth 2 (two) times daily. 180 capsule 3    spironolactone (ALDACTONE) 25 MG tablet 25 mg once daily.       VIT C/VIT E AC/LUT/COPPER/ZINC (PRESERVISION LUTEIN ORAL) Take by mouth.      warfarin (COUMADIN) 5 MG tablet Take 2 tabs by mouth on Tuesday,Thursday, Saturday and Sundays then 1.5 on all other days.      warfarin (COUMADIN) 7.5 MG tablet Take by mouth.      [DISCONTINUED] diazepam (VALIUM) 2 MG tablet Take 2 mg by mouth continuous prn.       No current facility-administered medications on file prior to visit.      Review of patient's allergies indicates:   Allergen Reactions    Codeine Nausea Only     weak     Past Surgical History:   Procedure Laterality Date    broken elbow      left    CARPAL METACARPAL INTERPOSITIONAL ARTHROPLASTY-THUMB Right 11/22/2017    [facility]    " COLONOSCOPY N/A 10/4/2017    [facility] ENDO    EYE SURGERY      cataract    heart bypass      2004    HERNIA REPAIR      right    RECONSTRUCTION-TENDON-FLEXOR CARPI RADIAS Right 11/22/2017    [facility] OR    ROTATOR CUFF REPAIR      right  2004     Family History   Problem Relation Age of Onset    Arthritis Mother     Diabetes Mother     Stroke Mother     Heart attack Father     Cancer Brother     Throat cancer Brother     Diabetes Maternal Aunt     Diabetes Maternal Uncle     Heart disease Maternal Uncle     Diabetes Paternal Aunt     Heart disease Paternal Aunt     Heart disease Paternal Uncle     Heart disease Maternal Grandmother     Cancer Maternal Grandfather      Social History     Socioeconomic History    Marital status:      Spouse name: Not on file    Number of children: 4    Years of education: GED    Highest education level: Not on file   Occupational History    Occupation: retired tug    Social Needs    Financial resource strain: Not on file    Food insecurity:     Worry: Not on file     Inability: Not on file    Transportation needs:     Medical: Not on file     Non-medical: Not on file   Tobacco Use    Smoking status: Former Smoker     Packs/day: 3.00     Years: 45.00     Pack years: 135.00     Types: Cigarettes     Last attempt to quit: 4/20/2004     Years since quitting: 15.0    Smokeless tobacco: Never Used   Substance and Sexual Activity    Alcohol use: Yes     Comment: was heavy drinker 35 years ago, rare use now    Drug use: No    Sexual activity: Yes     Partners: Female   Lifestyle    Physical activity:     Days per week: Not on file     Minutes per session: Not on file    Stress: Not on file   Relationships    Social connections:     Talks on phone: Not on file     Gets together: Not on file     Attends Buddhist service: Not on file     Active member of club or  "organization: Not on file     Attends meetings of clubs or organizations: Not on file     Relationship status: Not on file   Other Topics Concern    Not on file   Social History Narrative    Not on file     Review of Systems   Constitution: Negative for chills, fever and weakness.   Cardiovascular: Negative for claudication and leg swelling.   Respiratory: Positive for cough. Negative for shortness of breath.    Skin: Positive for dry skin and nail changes. Negative for itching and rash.   Musculoskeletal: Positive for arthritis, back pain and joint pain. Negative for falls, joint swelling and muscle weakness.   Gastrointestinal: Negative for diarrhea, nausea and vomiting.   Neurological: Positive for numbness and paresthesias. Negative for tremors.   Psychiatric/Behavioral: Negative for altered mental status and hallucinations.           Objective:       Vitals:    05/21/19 1105   BP: 136/80   Weight: 85.7 kg (189 lb)   Height: 5' 6" (1.676 m)   PainSc: 0-No pain       Physical Exam   Constitutional:   General: Pt. is well-developed, well-nourished, appears stated age, in no acute distress, alert and oriented x 3. No evidence of depression, anxiety, or agitation. Calm, cooperative, and communicative. Appropriate interactions and affect.       Cardiovascular:   Pulses:       Dorsalis pedis pulses are 2+ on the right side, and 2+ on the left side.        Posterior tibial pulses are 1+ on the right side, and 1+ on the left side.   There is decreased digital hair.   Musculoskeletal:        Right foot: There is normal range of motion.        Left foot:  There is normal range of motion.   Muscle strength is 5/5 in all groups bilaterally.    Decreased stride, station of gait.  apropulsive toe off.  Increased angle and base of gait.    Patient has hammertoes of digits 2-5 bilateral partially reducible without symptom today.    Visible and palpable bunion without pain at dorsomedial 1st metatarsal head right and left.  " Hallux abducted right and left partially reducible, tracks laterally without being track bound.  No ecchymosis, erythema, edema, or cardinal signs infection or signs of trauma same foot.     Neurological: A sensory deficit is present.   [place] 5.07 monofilamant testing is diminished Farhad feet. Sharp/dull sensation diminished Bilaterally   Skin: Skin is warm, dry and intact. No abrasion, no ecchymosis, no lesion and no rash noted. He is not diaphoretic. No cyanosis or erythema. No pallor. Nails show no clubbing.   Toenails 1-5 bilaterally are elongated by 2-3 mm, thickened by 2-3 mm, discolored/yellowed, dystrophic, brittle with subungual debris.    Focal hyperkeratotic lesion consisting entirely of hyperkeratotic tissue without open skin, drainage, pus, fluctuance, malodor, or signs of infection: medial IPJ of hallux farhad    Interdigital Spaces clean, dry and without evidence of break in skin integrity   Psychiatric: He has a normal mood and affect. His speech is normal.   Nursing note and vitals reviewed.        Assessment:       Encounter Diagnoses   Name Primary?    Type 2 diabetes, uncontrolled, with peripheral circulatory disorder Yes    Type II diabetes mellitus with neurological manifestations     Onychomycosis due to dermatophyte     Corn or callus     Hammer toes of both feet     Hallux abducto valgus, left     Hallux abducto valgus, right          Plan:       Percy was seen today for diabetes mellitus, diabetic foot exam, nail care and foot pain.    Diagnoses and all orders for this visit:    Type 2 diabetes, uncontrolled, with peripheral circulatory disorder    Type II diabetes mellitus with neurological manifestations    Onychomycosis due to dermatophyte    Corn or callus    Hammer toes of both feet    Hallux abducto valgus, left    Hallux abducto valgus, right      I counseled the patient on his conditions, their implications and medical management.      - Shoe inspection. Diabetic Foot  Education. Patient reminded of the importance of good nutrition and blood sugar control to help prevent podiatric complications of diabetes. Patient instructed on proper foot hygeine. We discussed wearing proper shoe gear, daily foot inspections, never walking without protective shoe gear, never putting sharp instruments to feet    - With patient's permission, nails were aggressively reduced and debrided x 10 to their soft tissue attachment mechanically and with electric , removing all offending nail and debris. Patient relates relief following the procedure.    - After cleansing the  area w/ alcohol prep pad the above mentioned hyperkeratosis was trimmed utilizing No 15 scapel, to a smooth base with out incident. Patient tolerated this  well and reported comfort to the area of medial hallux IPJ concha    - He will continue to monitor the areas daily, inspect his feet, wear protective shoe gear when ambulatory, moisturizer to maintain skin integrity and follow in this office in approximately 60-70 days, sooner PRN

## 2019-05-23 ENCOUNTER — TELEPHONE (OUTPATIENT)
Dept: FAMILY MEDICINE | Facility: CLINIC | Age: 79
End: 2019-05-23

## 2019-05-23 NOTE — TELEPHONE ENCOUNTER
----- Message from Amy Matthews sent at 5/23/2019  9:07 AM CDT -----  Contact: rebecca with [employer] [phone]  Type: Patient Call Back    Who called:rebecca with [employer] [phone]    What is the request in detail:member is requesting sleep study. Needs orders and clinicals. Fax no [phone]    Can the clinic reply by [facility]?    Would the patient rather a call back or a response via [facility]? Call back    Best call back number:rebecca with [employer] [phone]    Additional Information:

## 2019-05-29 ENCOUNTER — TELEPHONE (OUTPATIENT)
Dept: FAMILY MEDICINE | Facility: CLINIC | Age: 79
End: 2019-05-29

## 2019-05-29 DIAGNOSIS — J44.1 COPD EXACERBATION: ICD-10-CM

## 2019-05-29 RX ORDER — ALBUTEROL SULFATE 90 UG/1
AEROSOL, METERED RESPIRATORY (INHALATION)
Qty: 18 EACH | Refills: 0 | Status: SHIPPED | OUTPATIENT
Start: 2019-05-29 | End: 2020-01-30

## 2019-05-29 NOTE — TELEPHONE ENCOUNTER
----- Message from Edson Gonzalez sent at 5/29/2019 10:32 AM CDT -----  Contact: JAILENE JIMENES [[id number]]  Name of Who is Calling: JAILENE JIMENES [[id number]]      What is the request in detail: Pt is requesting a call back from clinical team in regard to getting his results of his test. Please contact to further discuss and advise.          Can the clinic reply by [facility]:       What Number to Call Back [facility]: [phone]

## 2019-05-29 NOTE — TELEPHONE ENCOUNTER
----- Message from Kelly Pride sent at 5/29/2019  2:13 PM CDT -----  Contact: Patient  Type: RX Refill Request    Who Called:  Patient    Refill or New Rx: Refill    RX Name and Strength: albuterol (PROVENTIL/VENTOLIN HFA) 90 mcg/actuation inhaler    How is the patient currently taking it? (ex. 1XDay):    Is this a 30 day or 90 day RX:    Preferred Pharmacy with phone number: [facility] [place], [address] [phone] (Phone)  [phone] (Fax)        Local or Mail Order: Local    Ordering Provider: Dr. Edmondson    Would the patient rather a call back or a response via [facility]?  Call back    Best Call Back Number:[phone]    Additional Information: Pt states he is running low

## 2019-05-29 NOTE — TELEPHONE ENCOUNTER
Spoke with pt states he has been waiting to hear from someone about the results of his sleep study    Please advise

## 2019-05-29 NOTE — TELEPHONE ENCOUNTER
Patient notified of charted test result with recommendation to continue to use his oxygen at night. Also provider can not write order to take oxygen out of the home because of this. Patient ask should he keep his appointment with Pulmonary on 6/11/19.  Advise that he should. Patient verbalize understanding.

## 2019-05-29 NOTE — TELEPHONE ENCOUNTER
I received the results today. It showed that his oxygen level drops to less than 88% when he sleeps at night. It is therefore recommended that he use the oxygen when he sleeps at night as low oxygen levels are an irritation to the heart and lungs.   I cannot write an order to remove the oxygen from his home.

## 2019-06-07 ENCOUNTER — OFFICE VISIT (OUTPATIENT)
Dept: URGENT CARE | Facility: CLINIC | Age: 79
End: 2019-06-07
Payer: MEDICARE

## 2019-06-07 ENCOUNTER — TELEPHONE (OUTPATIENT)
Dept: FAMILY MEDICINE | Facility: CLINIC | Age: 79
End: 2019-06-07

## 2019-06-07 VITALS
WEIGHT: 186 LBS | TEMPERATURE: 97 F | HEART RATE: 60 BPM | RESPIRATION RATE: 17 BRPM | SYSTOLIC BLOOD PRESSURE: 150 MMHG | DIASTOLIC BLOOD PRESSURE: 80 MMHG | HEIGHT: 68 IN | BODY MASS INDEX: 28.19 KG/M2 | OXYGEN SATURATION: 95 %

## 2019-06-07 DIAGNOSIS — M54.9 ACUTE RIGHT-SIDED BACK PAIN, UNSPECIFIED BACK LOCATION: ICD-10-CM

## 2019-06-07 DIAGNOSIS — N39.0 URINARY TRACT INFECTION WITHOUT HEMATURIA, SITE UNSPECIFIED: Primary | ICD-10-CM

## 2019-06-07 LAB
BILIRUB UR QL STRIP: POSITIVE
GLUCOSE UR QL STRIP: NEGATIVE
KETONES UR QL STRIP: NEGATIVE
LEUKOCYTE ESTERASE UR QL STRIP: NEGATIVE
PH, POC UA: 7 (ref 5–8)
POC BLOOD, URINE: NEGATIVE
POC NITRATES, URINE: POSITIVE
PROT UR QL STRIP: NEGATIVE
SP GR UR STRIP: 1 (ref 1–1.03)
UROBILINOGEN UR STRIP-ACNC: ABNORMAL (ref 0.3–2.2)

## 2019-06-07 PROCEDURE — 3288F FALL RISK ASSESSMENT DOCD: CPT | Mod: CPTII,S$GLB,, | Performed by: PHYSICIAN ASSISTANT

## 2019-06-07 PROCEDURE — 3079F DIAST BP 80-89 MM HG: CPT | Mod: CPTII,S$GLB,, | Performed by: PHYSICIAN ASSISTANT

## 2019-06-07 PROCEDURE — 1100F PR PT FALLS ASSESS DOC 2+ FALLS/FALL W/INJURY/YR: ICD-10-PCS | Mod: CPTII,S$GLB,, | Performed by: PHYSICIAN ASSISTANT

## 2019-06-07 PROCEDURE — 1100F PTFALLS ASSESS-DOCD GE2>/YR: CPT | Mod: CPTII,S$GLB,, | Performed by: PHYSICIAN ASSISTANT

## 2019-06-07 PROCEDURE — 81003 URINALYSIS AUTO W/O SCOPE: CPT | Mod: QW,S$GLB,, | Performed by: PHYSICIAN ASSISTANT

## 2019-06-07 PROCEDURE — 3077F PR MOST RECENT SYSTOLIC BLOOD PRESSURE >= 140 MM HG: ICD-10-PCS | Mod: CPTII,S$GLB,, | Performed by: PHYSICIAN ASSISTANT

## 2019-06-07 PROCEDURE — 99499 RISK ADDL DX/OHS AUDIT: ICD-10-PCS | Mod: S$GLB,,, | Performed by: PHYSICIAN ASSISTANT

## 2019-06-07 PROCEDURE — 99499 UNLISTED E&M SERVICE: CPT | Mod: S$GLB,,, | Performed by: PHYSICIAN ASSISTANT

## 2019-06-07 PROCEDURE — 81003 POCT URINALYSIS, DIPSTICK, AUTOMATED, W/O SCOPE: ICD-10-PCS | Mod: QW,S$GLB,, | Performed by: PHYSICIAN ASSISTANT

## 2019-06-07 PROCEDURE — 3077F SYST BP >= 140 MM HG: CPT | Mod: CPTII,S$GLB,, | Performed by: PHYSICIAN ASSISTANT

## 2019-06-07 PROCEDURE — 99214 OFFICE O/P EST MOD 30 MIN: CPT | Mod: S$GLB,,, | Performed by: PHYSICIAN ASSISTANT

## 2019-06-07 PROCEDURE — 3079F PR MOST RECENT DIASTOLIC BLOOD PRESSURE 80-89 MM HG: ICD-10-PCS | Mod: CPTII,S$GLB,, | Performed by: PHYSICIAN ASSISTANT

## 2019-06-07 PROCEDURE — 99214 PR OFFICE/OUTPT VISIT, EST, LEVL IV, 30-39 MIN: ICD-10-PCS | Mod: S$GLB,,, | Performed by: PHYSICIAN ASSISTANT

## 2019-06-07 PROCEDURE — 3288F PR FALLS RISK ASSESSMENT DOCUMENTED: ICD-10-PCS | Mod: CPTII,S$GLB,, | Performed by: PHYSICIAN ASSISTANT

## 2019-06-07 RX ORDER — NITROFURANTOIN 25; 75 MG/1; MG/1
100 CAPSULE ORAL 2 TIMES DAILY
Qty: 14 CAPSULE | Refills: 0 | Status: SHIPPED | OUTPATIENT
Start: 2019-06-07 | End: 2019-06-14

## 2019-06-07 RX ORDER — CIPROFLOXACIN 500 MG/1
500 TABLET ORAL 2 TIMES DAILY
Qty: 14 TABLET | Refills: 0 | Status: SHIPPED | OUTPATIENT
Start: 2019-06-07 | End: 2019-06-07

## 2019-06-07 RX ORDER — METHOCARBAMOL 500 MG/1
500 TABLET, FILM COATED ORAL 3 TIMES DAILY PRN
Qty: 15 TABLET | Refills: 0 | Status: SHIPPED | OUTPATIENT
Start: 2019-06-07 | End: 2019-06-17

## 2019-06-07 RX ORDER — SULFAMETHOXAZOLE AND TRIMETHOPRIM 800; 160 MG/1; MG/1
1 TABLET ORAL 2 TIMES DAILY
Qty: 14 TABLET | Refills: 0 | Status: SHIPPED | OUTPATIENT
Start: 2019-06-07 | End: 2019-06-07 | Stop reason: ALTCHOICE

## 2019-06-07 NOTE — TELEPHONE ENCOUNTER
----- Message from Dorys Mosqueda sent at 6/7/2019 10:19 AM CDT -----  Contact: self  [phone]  Type: Patient Call Back    Who called: self    What is the request in detail: would like to speak to the nurse regarding right side pain. Been hurting for the last 3 days. Patient states he's going to urgent care on [facility]. And if he needs to be admitted in the hospital, he's going to [facility].    Would the patient rather a call back or a response via [facility]? Call back    Best call back number: [phone]

## 2019-06-07 NOTE — PATIENT INSTRUCTIONS
PLEASE READ YOUR DISCHARGE INSTRUCTIONS ENTIRELY AS IT CONTAINS IMPORTANT INFORMATION.  Your urine was sent for culture.  We will call you with results in 3-5 days.  - Rest.    - Drink plenty of fluids.    - Tylenol or Ibuprofen as directed as needed for fever/pain.    - Follow up with your PCP or specialty clinic as directed in the next 1-2 weeks if not improved or as needed.  You can call [phone] to schedule an appointment with the appropriate provider.    - If you were prescribed antibiotics, please take them to completion.  - If you were prescribed a narcotic medication, do not drive or operate heavy equipment or machinery while taking these medications.  - If you  smoke, please stop smoking.  -You must understand that you've received an Urgent Care treatment only and that you may be released before all your medical problems are known or treated. You, the patient, will    arrange for follow up care as instructed.  - Please return to Urgent Care or to the Emergency Department if your symptoms worsen.    Patient aware and verbalized understanding.    Urinary Tract Infections in Men    Urinary tract infections (UTIs) are most often caused by bacteria that invade the urinary tract. The bacteria may come from outside the body. Or they may travel from the skin outside of rectum into the urethra. Pain in or around the urinary tract is a common symptom for most UTIs. But the only way to know for sure if you have a UTI is to have a urinalysis and urine culture.   Types of UTIs  · Cystitis: This is a bladder infection and is often linked to a blockage from an enlarged prostate. You may have an urgent or frequent need to urinate, and bloody urine. Treatment includes antibiotics and medicine to relax or shrink the prostate. Sometimes, surgery is needed.  · Urethritis: This is an infection of the urethra. You may have a discharge from the urethra or burning when you urinate. You may also have pain in the urethra or  penis. It is treated with antibiotics.  · Prostatitis: This is an inflammation or infection of the prostate. You may have an urgent or frequent need to urinate, fever, or burning when you urinate. Or you may have a tender prostate, or a vague feeling of pressure. Prostatitis is treated with a range of medicines, depending on the cause.  · Pyelonephritis: This is a kidney infection. If not treated, it can be serious and damage your kidneys. In severe cases you may be hospitalized. You may have a fever and upper back pain.  Treating a UTI  · Medicine: Most UTIs are treated with antibiotics. These kill the bacteria. The length of time you need to take them depends on the type of infection. Take antibiotics exactly as directed until all of the medicine is gone. If you don't, the infection may not go away and may become harder to treat. For certain types of UTIs, you may be given other medicine to help treat your symptoms.  · Lifestyle changes: The lifestyle changes below will help get rid of your current infection. They may also help prevent future UTIs.  ¨ Drink plenty of fluids such as water, juice, or other caffeine-free drinks. This helps flush bacteria out of your system.  ¨ Empty your bladder when you feel the urge to urinate and before going to sleep. Urine that stays in your bladder promotes infection.  ¨ Use condoms during sex. These help prevent UTIs caused by sexually transmitted bacteria.  ¨ Keep follow-up appointments with your healthcare provider. He or she can may do tests to make sure the infection has cleared. If needed, more treatment can be started.  · Other treatment: Most UTIs respond to medicine. But sometimes a procedure or surgery is needed. This can treat an enlarged prostate, or remove a kidney stone or other blockage. Surgery may also treat problems caused by scarring or long-term infections.  Date Last Reviewed: 1/1/2017  © 7887-9285 [employer]. [address] [place] [address], [place]  [place] [address]. All rights reserved. This information is not intended as a substitute for professional medical care. Always follow your healthcare professional's instructions.

## 2019-06-07 NOTE — TELEPHONE ENCOUNTER
Spoke with pt states right side pain x 3 days, pain rate is an 11 and states he can barely walk pt states he is going to [facility] urgent [facility] and if needs to go to hospital pt states he will go to [facility] because it's closer to his house. Pt states wanted to let Dr. Edmondson know what's going on.      Please advise

## 2019-06-07 NOTE — PROGRESS NOTES
"Subjective:       Patient ID: Percy Ramirez is a 78 y.o. male.    Vitals:  height is 5' 8" (1.727 m) and weight is 84.4 kg (186 lb). His temperature is 97.1 °F (36.2 °C). His blood pressure is 150/80 (abnormal) and his pulse is 60. His respiration is 17 and oxygen saturation is 95%.     Chief Complaint: Back Pain    Mr. Ramirez presents for evaluation of right sided back pain x 3 days.  The pain is intermittent & is sharp.  He denies any dysuria, frequency, urgency, or deep rectal pain.  The pain is worse with movement and has stiffness as well.  He denies any radiating leg pain, new paresthesias (baseline neuropathy), or weakness, B/B dysfunction.  He denies any trauma.  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    Back Pain   This is a new problem. Episode onset: 3days  The problem occurs constantly. The problem has been rapidly worsening since onset. The quality of the pain is described as shooting and aching. The pain does not radiate. The pain is at a severity of 10/10. The pain is severe. The pain is the same all the time. The symptoms are aggravated by lying down, bending, position, standing, sitting and twisting. Stiffness is present all day. Pertinent negatives include no abdominal pain, bladder incontinence, bowel incontinence, dysuria or numbness. He has tried NSAIDs for the symptoms. The treatment provided no relief.       Constitution: Negative for fatigue.   Neck: Negative for neck pain and neck stiffness.   Gastrointestinal: Positive for diarrhea. Negative for abdominal pain and bowel incontinence.   Genitourinary: Negative for dysuria, urgency, bladder incontinence and hematuria.   Musculoskeletal: Positive for pain, back pain and muscle ache. Negative for trauma, joint pain, joint swelling, muscle cramps and history of spine disorder.   Skin: Negative for rash.   Neurological: Negative for coordination disturbances, numbness and tingling.       Objective:      Physical Exam "   Constitutional: He is oriented to person, place, and time. He appears well-developed and well-nourished. He is cooperative.  Non-toxic appearance. He does not appear ill. No distress.   HENT:   Head: Normocephalic and atraumatic.   Right Ear: Hearing, external ear and ear canal normal. TM is normal.   Left Ear: Hearing, external ear and ear canal normal.  TM is normal.   Nose:No rhinorrhea or nasal deformity. No epistaxis. Right sinus exhibits no maxillary sinus tenderness and no frontal sinus tenderness. Left sinus exhibits no maxillary sinus tenderness and no frontal sinus tenderness. Mucosa WNL.  Mouth/Throat: Uvula is midline, oropharynx is clear and moist and mucous membranes are normal. No trismus in the jaw. Normal dentition. No uvula swelling. No posterior oropharyngeal edema, posterior oropharyngeal erythema or tonsillar abscesses. No tonsillar exudate.   Eyes: Conjunctivae and lids are normal. No scleral icterus.   Sclera clear bilat   Neck: Trachea normal, full passive range of motion without pain and phonation normal. Neck supple.   Cardiovascular: Normal rate, regular rhythm, normal heart sounds, intact distal pulses and normal pulses.   Pulmonary/Chest: Effort normal and breath sounds normal. No respiratory distress. He has no decreased breath sounds. He has no wheezes. He has no rhonchi. He has no rales.   Abdominal: Soft. Normal appearance and bowel sounds are normal. He exhibits no distension. There is no tenderness.  No CVA tenderness bilaterally.  Musculoskeletal: Normal range of motion. He exhibits no edema or deformity. Pain with flex/ext & TTP left SI joint.    Lymphadenopathy:     He has no cervical adenopathy.   Neurological: He is alert and oriented to person, place, and time. He exhibits normal muscle tone. Coordination normal.   Skin: Skin is warm, dry and intact. He is not diaphoretic. No pallor.   Psychiatric: He has a normal mood and affect. His speech is normal and behavior is  normal. Judgment and thought content normal. Cognition and memory are normal.   Nursing note and vitals reviewed.    Results for orders placed or performed in visit on 06/07/19   POCT Urinalysis, Dipstick, Automated, W/O Scope   Result Value Ref Range    POC Blood, Urine Negative Negative    POC Bilirubin, Urine Positive (A) Negative    POC Urobilinogen, Urine pos 0.3 - 2.2    POC Ketones, Urine Negative Negative    POC Protein, Urine Negative Negative    POC Nitrates, Urine Positive (A) Negative    POC Glucose, Urine Negative Negative    pH, UA 7.0 5 - 8    POC Specific Gravity, Urine 1.005 1.003 - 1.029    POC Leukocytes, Urine Negative Negative       Assessment:       1. Urinary tract infection without hematuria, site unspecified    2. Acute right-sided back pain, unspecified back location        Plan:         Urinary tract infection without hematuria, site unspecified    Acute right-sided back pain, unspecified back location  -     POCT Urinalysis, Dipstick, Automated, W/O Scope  -     Urine culture    Other orders  -     methocarbamol (ROBAXIN) 500 MG Tab; Take 1 tablet (500 mg total) by mouth 3 (three) times daily as needed.  Dispense: 15 tablet; Refill: 0  -     sulfamethoxazole-trimethoprim 800-160mg (BACTRIM DS) 800-160 mg Tab; Take 1 tablet by mouth 2 (two) times daily. for 7 days  Dispense: 14 tablet; Refill: 0    Pain is likely a combination of UTI & musculoskeletal back pain.  He is on coumadin, so no NSAIDs.  Continue to take tylenol arthritis PRN. Instructed not to take meclizine while taking robaxin.  Will culture urine & call with results.     Patient Instructions   PLEASE READ YOUR DISCHARGE INSTRUCTIONS ENTIRELY AS IT CONTAINS IMPORTANT INFORMATION.  Your urine was sent for culture.  We will call you with results in 3-5 days.  - Rest.    - Drink plenty of fluids.    - Tylenol or Ibuprofen as directed as needed for fever/pain.    - Follow up with your PCP or specialty clinic as directed in the next  1-2 weeks if not improved or as needed.  You can call [phone] to schedule an appointment with the appropriate provider.    - If you were prescribed antibiotics, please take them to completion.  - If you were prescribed a narcotic medication, do not drive or operate heavy equipment or machinery while taking these medications.  - If you  smoke, please stop smoking.  -You must understand that you've received an Urgent Care treatment only and that you may be released before all your medical problems are known or treated. You, the patient, will    arrange for follow up care as instructed.  - Please return to Urgent Care or to the Emergency Department if your symptoms worsen.    Patient aware and verbalized understanding.    Urinary Tract Infections in Men    Urinary tract infections (UTIs) are most often caused by bacteria that invade the urinary tract. The bacteria may come from outside the body. Or they may travel from the skin outside of rectum into the urethra. Pain in or around the urinary tract is a common symptom for most UTIs. But the only way to know for sure if you have a UTI is to have a urinalysis and urine culture.   Types of UTIs  · Cystitis: This is a bladder infection and is often linked to a blockage from an enlarged prostate. You may have an urgent or frequent need to urinate, and bloody urine. Treatment includes antibiotics and medicine to relax or shrink the prostate. Sometimes, surgery is needed.  · Urethritis: This is an infection of the urethra. You may have a discharge from the urethra or burning when you urinate. You may also have pain in the urethra or penis. It is treated with antibiotics.  · Prostatitis: This is an inflammation or infection of the prostate. You may have an urgent or frequent need to urinate, fever, or burning when you urinate. Or you may have a tender prostate, or a vague feeling of pressure. Prostatitis is treated with a range of medicines, depending on the  cause.  · Pyelonephritis: This is a kidney infection. If not treated, it can be serious and damage your kidneys. In severe cases you may be hospitalized. You may have a fever and upper back pain.  Treating a UTI  · Medicine: Most UTIs are treated with antibiotics. These kill the bacteria. The length of time you need to take them depends on the type of infection. Take antibiotics exactly as directed until all of the medicine is gone. If you don't, the infection may not go away and may become harder to treat. For certain types of UTIs, you may be given other medicine to help treat your symptoms.  · Lifestyle changes: The lifestyle changes below will help get rid of your current infection. They may also help prevent future UTIs.  ¨ Drink plenty of fluids such as water, juice, or other caffeine-free drinks. This helps flush bacteria out of your system.  ¨ Empty your bladder when you feel the urge to urinate and before going to sleep. Urine that stays in your bladder promotes infection.  ¨ Use condoms during sex. These help prevent UTIs caused by sexually transmitted bacteria.  ¨ Keep follow-up appointments with your healthcare provider. He or she can may do tests to make sure the infection has cleared. If needed, more treatment can be started.  · Other treatment: Most UTIs respond to medicine. But sometimes a procedure or surgery is needed. This can treat an enlarged prostate, or remove a kidney stone or other blockage. Surgery may also treat problems caused by scarring or long-term infections.  Date Last Reviewed: 1/1/2017  © 8541-5620 [employer]. [address] [place] [address], [place] [address]. All rights reserved. This information is not intended as a substitute for professional medical care. Always follow your healthcare professional's instructions.

## 2019-06-08 NOTE — PROGRESS NOTES
Spoke with pharmacy again.  We discussed cipro during last conversation & they felt that would be fine, but now saying it will also have an interaction with warfarin.  Macrobid will be sent in for patient.

## 2019-06-11 ENCOUNTER — OFFICE VISIT (OUTPATIENT)
Dept: PULMONOLOGY | Facility: CLINIC | Age: 79
End: 2019-06-11
Payer: MEDICARE

## 2019-06-11 VITALS
SYSTOLIC BLOOD PRESSURE: 149 MMHG | OXYGEN SATURATION: 92 % | BODY MASS INDEX: 27.94 KG/M2 | DIASTOLIC BLOOD PRESSURE: 78 MMHG | HEART RATE: 70 BPM | WEIGHT: 183.75 LBS

## 2019-06-11 DIAGNOSIS — G47.33 OBSTRUCTIVE SLEEP APNEA ON CPAP: ICD-10-CM

## 2019-06-11 DIAGNOSIS — J44.9 COPD MIXED TYPE: Primary | ICD-10-CM

## 2019-06-11 DIAGNOSIS — R91.1 PULMONARY NODULE: ICD-10-CM

## 2019-06-11 LAB
BACTERIA UR CULT: NO GROWTH
BACTERIA UR CULT: NORMAL

## 2019-06-11 PROCEDURE — 99214 OFFICE O/P EST MOD 30 MIN: CPT | Mod: S$GLB,,, | Performed by: NURSE PRACTITIONER

## 2019-06-11 PROCEDURE — 3077F PR MOST RECENT SYSTOLIC BLOOD PRESSURE >= 140 MM HG: ICD-10-PCS | Mod: CPTII,S$GLB,, | Performed by: NURSE PRACTITIONER

## 2019-06-11 PROCEDURE — 3288F PR FALLS RISK ASSESSMENT DOCUMENTED: ICD-10-PCS | Mod: CPTII,S$GLB,, | Performed by: NURSE PRACTITIONER

## 2019-06-11 PROCEDURE — 99999 PR PBB SHADOW E&M-EST. PATIENT-LVL III: CPT | Mod: PBBFAC,,, | Performed by: NURSE PRACTITIONER

## 2019-06-11 PROCEDURE — 99214 PR OFFICE/OUTPT VISIT, EST, LEVL IV, 30-39 MIN: ICD-10-PCS | Mod: S$GLB,,, | Performed by: NURSE PRACTITIONER

## 2019-06-11 PROCEDURE — 3077F SYST BP >= 140 MM HG: CPT | Mod: CPTII,S$GLB,, | Performed by: NURSE PRACTITIONER

## 2019-06-11 PROCEDURE — 1100F PTFALLS ASSESS-DOCD GE2>/YR: CPT | Mod: CPTII,S$GLB,, | Performed by: NURSE PRACTITIONER

## 2019-06-11 PROCEDURE — 3288F FALL RISK ASSESSMENT DOCD: CPT | Mod: CPTII,S$GLB,, | Performed by: NURSE PRACTITIONER

## 2019-06-11 PROCEDURE — 1100F PR PT FALLS ASSESS DOC 2+ FALLS/FALL W/INJURY/YR: ICD-10-PCS | Mod: CPTII,S$GLB,, | Performed by: NURSE PRACTITIONER

## 2019-06-11 PROCEDURE — 3078F PR MOST RECENT DIASTOLIC BLOOD PRESSURE < 80 MM HG: ICD-10-PCS | Mod: CPTII,S$GLB,, | Performed by: NURSE PRACTITIONER

## 2019-06-11 PROCEDURE — 3078F DIAST BP <80 MM HG: CPT | Mod: CPTII,S$GLB,, | Performed by: NURSE PRACTITIONER

## 2019-06-11 PROCEDURE — 99999 PR PBB SHADOW E&M-EST. PATIENT-LVL III: ICD-10-PCS | Mod: PBBFAC,,, | Performed by: NURSE PRACTITIONER

## 2019-06-11 NOTE — PROGRESS NOTES
CHIEF COMPLAINT:    Chief Complaint   Patient presents with    Sleep Apnea       HISTORY OF PRESENT ILLNESS: Percy Ramirez is a 78 y.o. male Former smoker with DM, CAD s/p CABG, PAF, ELINA on CPAP,  COPD is here for pulmonary evaluation. Patient with symptoms of persistent EDS and awakening to go to bathroom despite CPAP usage. ESS 21. Symptoms of snoring and apnea has resolved. Reports his overnight pulse oximetry reading is low without his cpap.     Reports chronic dyspnea on exertion, can walk less than 1 block, using rescue inhaler 1-2 x a day.     Split night study from  3/21/2017: AHI 31.7, Effective control acieved with cpap 14 cm H2O    He obtains his supplies from [employer]. Wears oxygen at night as well.    Presents with Dreamstation CPAP at 14 cm H2O, Aflex 2, usage 28/30 days, Average usage 6.6, no other data available with this model    ESS 21    PAST MEDICAL HISTORY:    Active Ambulatory Problems     Diagnosis Date Noted    Major depressive disorder, recurrent episode, mild     Hypertension     Type 2 diabetes, uncontrolled, with retinopathy     CAD, multiple vessel     S/P CABG x 2     Macular degeneration     Obstructive sleep apnea on CPAP 07/27/2012    Anxiety state, unspecified 10/24/2013    Insulin long-term use 02/16/2015    Primary osteoarthritis of both knees 02/25/2015    Chronic low back pain 02/25/2015    DJD (degenerative joint disease), lumbar 04/30/2015    Type 2 diabetes, uncontrolled, with neuropathy 07/29/2016    Bilateral carotid artery disease 07/29/2016    Hyperlipidemia LDL goal <100 07/29/2016    Type 2 diabetes, uncontrolled, with peripheral circulatory disorder 07/29/2016    COPD mixed type 07/29/2016    Atherosclerosis of abdominal aorta 07/29/2016    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 07/29/2016    Overweight (BMI 25.0-29.9) 07/29/2016    Paroxysmal atrial fibrillation 03/06/2017    Chronic stable angina  03/06/2017    Senile purpura 03/06/2017    Osteoarthritis of carpometacarpal (CMC) joint of left thumb 11/22/2017    MCI (mild cognitive impairment) 06/18/2018    Chronic vertigo 02/11/2019    Pulmonary nodule 03/20/2019    Dyspnea on exertion 03/20/2019       Past Medical History:   Diagnosis Date    Allergy     Arthritis     CAD, multiple vessel     Cataract     Depression     Hyperlipidemia     Hypertension     Macular degeneration     S/P CABG x 2     Type 2 diabetes mellitus with circulatory disorder                 PAST SURGICAL HISTORY:    Past Surgical History:   Procedure Laterality Date    broken elbow      left    CARPAL METACARPAL INTERPOSITIONAL ARTHROPLASTY-THUMB Right 11/22/2017    [facility]    COLONOSCOPY N/A 10/4/2017    Performed by [facility]ung [facility] ENDO    EYE SURGERY      cataract    heart bypass      2004    HERNIA REPAIR      right    RECONSTRUCTION-TENDON-FLEXOR CARPI RADIAS Right 11/22/2017    [facility] by [facility] OR    ROTATOR CUFF REPAIR      right  2004         FAMILY HISTORY:                Family History   Problem Relation Age of Onset    Arthritis Mother     Diabetes Mother     Stroke Mother     Heart attack Father     Cancer Brother     Throat cancer Brother     Diabetes Maternal Aunt     Diabetes Maternal Uncle     Heart disease Maternal Uncle     Diabetes Paternal Aunt     Heart disease Paternal Aunt     Heart disease Paternal Uncle     Heart disease Maternal Grandmother     Cancer Maternal Grandfather        SOCIAL HISTORY:          Tobacco:   Social History     Tobacco Use   Smoking Status Former Smoker    Packs/day: 3.00    Years: 45.00    Pack years: 135.00    Types: Cigarettes    Last attempt to quit: 4/20/2004    Years since quitting: 15.1   Smokeless Tobacco Never Used       alcohol use:    Social History     Substance and Sexual Activity   Alcohol Use Yes  "   Comment: was heavy drinker 35 years ago, rare use now                   ALLERGIES:    Review of patient's allergies indicates:   Allergen Reactions    Aricept odt [donepezil] Diarrhea and Nausea And Vomiting    Codeine Nausea Only     weak    Ranexa [ranolazine]        CURRENT MEDICATIONS:    Current Outpatient Medications   Medication Sig Dispense Refill    albuterol (PROVENTIL/VENTOLIN HFA) 90 mcg/actuation inhaler INHALE 2 PUFFS BY MOUTH EVERY 6 HOURS AS NEEDED FOR WHEEZING AND FOR SHORTNESS OF BREATH 18 each 0    BD INSULIN PEN NEEDLE UF SHORT 31 gauge x 5/16" Ndle       BD INSULIN SYRINGE ULTRA-FINE 1/2 mL 31 gauge x 15/64" Syrg       carvedilol (COREG) 25 MG tablet Take 1 tablet (25 mg total) by mouth 2 (two) times daily. 180 tablet 1    cinnamon bark 500 mg capsule Take 1,000 mg by mouth once daily.        clopidogrel (PLAVIX) 75 mg tablet Take 75 mg by mouth once daily.      donepezil (ARICEPT) 10 MG tablet Take 1 tablet (10 mg total) by mouth every evening. 30 tablet 11    DULoxetine (CYMBALTA) 30 MG capsule Take 1 capsule (30 mg total) by mouth once daily. 90 capsule 1    furosemide (LASIX) 40 MG tablet Take 40 mg by mouth once daily.       gabapentin (NEURONTIN) 300 MG capsule TAKE 2 CAPSULES BY MOUTH 3 TIMES DAILY 540 capsule 1    glimepiride (AMARYL) 4 MG tablet Take 4 mg by mouth daily with breakfast.       insulin NPH-insulin regular, 70/30, (NOVOLIN 70/30) 100 unit/mL (70-30) injection Inject into the skin. Inject 5 units at breakfast and inject 5 units at night      insulin syringe-needle U-100 1/2 mL 31 x 5/16" Syrg       losartan (COZAAR) 100 MG tablet Take 1 tablet (100 mg total) by mouth once daily. 90 tablet 1    meclizine (ANTIVERT) 12.5 mg tablet Take 1 tablet (12.5 mg total) by mouth 3 (three) times daily as needed for Dizziness. 252 tablet 1    metformin (GLUCOPHAGE) 500 MG tablet Take 1,000 mg by mouth 2 (two) times daily with meals. 2 Tablets in the morning, 2 " "Tablets in the evening      nitroGLYCERIN (NITROSTAT) 0.3 MG SL tablet PLACE 1 TABLET UNDER TONGUE AS NEEDED FOR CHEST PAIN EVERY 5 MINUTES, UP TO 3 DOSES IN 15MINUTES 100 tablet 0    OM-3/DHA/EPA/FISH OIL/VIT D3 (FISH OIL-VIT D3 ORAL) Take 2,000 Units by mouth once daily.       pravastatin (PRAVACHOL) 20 MG tablet Take 1 tablet (20 mg total) by mouth once daily. 90 tablet 1    rivastigmine tartrate (EXELON) 1.5 MG capsule Take 1 capsule (1.5 mg total) by mouth 2 (two) times daily. 180 capsule 3    spironolactone (ALDACTONE) 25 MG tablet 25 mg once daily.       VIT C/VIT E AC/LUT/COPPER/ZINC (PRESERVISION LUTEIN ORAL) Take by mouth.      warfarin (COUMADIN) 5 MG tablet Take 2 tabs by mouth on Tuesday,Thursday, Saturday and Sundays then 1.5 on all other days.      warfarin (COUMADIN) 7.5 MG tablet Take by mouth.      umeclidinium (INCRUSE ELLIPTA) 62.5 mcg/actuation DsDv Inhale 1 each into the lungs once daily. Controller 1 each 5     No current facility-administered medications for this visit.                   REVIEW OF SYSTEMS:   Review of Systems   Constitutional: Negative for fever, chills and activity change.   HENT: Positive for postnasal drip and congestion (right nare stopped up all the time).    Respiratory: Positive for apnea (resole with cpap), snoring (resolve with cpap), cough (minimal dry cough), dyspnea on extertion (1 block), use of rescue inhaler (1-2 a day) and somnolence. Negative for chest tightness, wheezing and Paroxysmal Nocturnal Dyspnea.    Cardiovascular: Positive for leg swelling. Negative for chest pain and palpitations.        Follow by Dr. Valderrama, last visit 1 month ago, "everything ok"    Gastrointestinal: Negative for acid reflux.   Psychiatric/Behavioral: Positive for sleep disturbance.        PHYSICAL EXAM:  Vitals:    06/11/19 1044   BP: (Abnormal) 149/78   Pulse: 70   SpO2: (Abnormal) 92%   Weight: 83.3 kg (183 lb 12.1 oz)   PainSc:   8   PainLoc: Back     Body mass index " is 27.94 kg/m².   Physical Exam   Constitutional: He is oriented to person, place, and time. He appears well-developed and well-nourished. No distress.   HENT:   Head: Normocephalic.   Nose: Nose normal.   Mouth/Throat: Oropharynx is clear and moist. Mallampati Score: II.   Neck: Neck supple.   Cardiovascular: Normal rate and regular rhythm.   Pulmonary/Chest: Normal expansion, effort normal and breath sounds normal.   Neurological: He is alert and oriented to person, place, and time. Gait normal.   Skin: He is not diaphoretic.   Psychiatric: He has a normal mood and affect. His behavior is normal.        ambulatory  on RA                                         DATA :  Pulmonary Functions Testing Results:  1/31/13 tlc 93%; dlco 46%  4/8/14 ratio 67%; fvc 76%; fve 73%  6/14/19 ratio 48%, fev 1 54.8 fvc 85.5  Walk test: pending results  ABG: none  CXR:   Lungs clear  CT CHEST:  2/15/18 rll ggo and tib in lingula.    PPD none        2-d echo 12/30/11  1.  The right atrium and right ventricle are of normal dimension.            2.  Tricuspid regurgitation is mild.                                         3.  PA pressure 28-30 mmHg.                                                  4.  Enlargement of the left atrium.                                          5.  Normal mitral valve structure and valve excursion.  No mitral            stenosis.                                                                     No MR detected from this study.                                             6.  Normal left ventricular dimensions and systolic function, ejection       fraction 50%-55%.                                                            7.  Grade I left ventricular diastolic dysfunction.                          8.  Mild aortic sclerosis with good valve excursion.  No aortic stenosis.    Minimal aortic regurgitation.                                                9.  No pericardial effusion seen.       bnp 8/5/10  134;1/24/13 164     2013: 6 min walk 350 meters; no significant desaturation    ASSESSMENT    ICD-10-CM ICD-9-CM    1. COPD mixed type J44.9 496 Spirometry with/without bronchodilator      Six Minute Walk Test to qualify for Home Oxygen      umeclidinium (INCRUSE ELLIPTA) 62.5 mcg/actuation DsDv   2. Pulmonary nodule R91.1 793.11    3. Obstructive sleep apnea on CPAP G47.33 327.23 CPAP titration (Must have diagnosis of ELINA from previous sleep study.)    Z99.89 V46.8        PLAN:    Problem List Items Addressed This Visit     Unprioritized              Pulmonary nodule    Overview     Ct with 0.5 mm rll ggo.  Repeat ct in 1 year.             Obstructive sleep apnea on CPAP    Overview     Patient with persistent EDS despite regular cpap usage, unfortunately his equipment does not have AHI reading capabilities. Will need cpap titration study           Relevant Orders    CPAP titration (Must have diagnosis of ELINA from previous sleep study.)    COPD mixed type - Primary    Overview     PFTs increasing severity based on increasing symptoms and reduced fev1, start incruse, consider addition laba if symptoms persist           Relevant Medications    umeclidinium (INCRUSE ELLIPTA) 62.5 mcg/actuation DsDv    Other Relevant Orders    Spirometry with/without bronchodilator (Completed)    Six Minute Walk Test to qualify for Home Oxygen              Follow up follow up after test .

## 2019-06-12 ENCOUNTER — TELEPHONE (OUTPATIENT)
Dept: URGENT CARE | Facility: CLINIC | Age: 79
End: 2019-06-12

## 2019-06-12 NOTE — TELEPHONE ENCOUNTER
----- Message from Belgica Saldivar PA-C sent at 6/12/2019  8:06 AM CDT -----  Please call the patient regarding his negative UCx result.

## 2019-06-14 ENCOUNTER — HOSPITAL ENCOUNTER (OUTPATIENT)
Dept: RESPIRATORY THERAPY | Facility: HOSPITAL | Age: 79
Discharge: HOME | End: 2019-06-14
Attending: NURSE PRACTITIONER
Payer: MEDICARE

## 2019-06-14 VITALS — OXYGEN SATURATION: 98 % | RESPIRATION RATE: 20 BRPM | HEART RATE: 65 BPM

## 2019-06-14 DIAGNOSIS — J44.9 COPD MIXED TYPE: ICD-10-CM

## 2019-06-14 LAB
BRPFT: ABNORMAL
FEF 25 75 CHG: 20.2 %
FEF 25 75 LLN: 0.78
FEF 25 75 POST REF: 20.3 %
FEF 25 75 PRE REF: 16.9 %
FEF 25 75 REF: 1.96
FET100 CHG: -21 %
FEV1 CHG: -3.4 %
FEV1 FVC CHG: 4.6 %
FEV1 FVC LLN: 61
FEV1 FVC POST REF: 66.7 %
FEV1 FVC PRE REF: 63.7 %
FEV1 FVC REF: 76
FEV1 LLN: 1.85
FEV1 POST REF: 53 %
FEV1 PRE REF: 54.8 %
FEV1 REF: 2.63
FVC CHG: -7.7 %
FVC LLN: 2.54
FVC POST REF: 79 %
FVC PRE REF: 85.5 %
FVC REF: 3.49
PEF CHG: 12.3 %
PEF LLN: 4.8
PEF POST REF: 51.4 %
PEF PRE REF: 45.8 %
PEF REF: 6.87
POST FEF 25 75: 0.4 L/S (ref 0.78–3.14)
POST FET 100: 14.5 SEC
POST FEV1 FVC: 50.51 % (ref 61.32–90.19)
POST FEV1: 1.39 L (ref 1.85–3.41)
POST FVC: 2.76 L (ref 2.54–4.44)
POST PEF: 3.53 L/S (ref 4.8–8.94)
PRE FEF 25 75: 0.33 L/S (ref 0.78–3.14)
PRE FET 100: 18.36 SEC
PRE FEV1 FVC: 48.28 % (ref 61.32–90.19)
PRE FEV1: 1.44 L (ref 1.85–3.41)
PRE FVC: 2.99 L (ref 2.54–4.44)
PRE PEF: 3.15 L/S (ref 4.8–8.94)

## 2019-06-14 PROCEDURE — 94060 PR EVAL OF BRONCHOSPASM: ICD-10-PCS | Mod: 26,,, | Performed by: INTERNAL MEDICINE

## 2019-06-14 PROCEDURE — 94375 RESPIRATORY FLOW VOLUME LOOP: CPT

## 2019-06-14 PROCEDURE — 94618 PULMONARY STRESS TESTING: CPT

## 2019-06-14 PROCEDURE — 25000242 PHARM REV CODE 250 ALT 637 W/ HCPCS: Performed by: NURSE PRACTITIONER

## 2019-06-14 PROCEDURE — 94060 EVALUATION OF WHEEZING: CPT | Mod: 26,,, | Performed by: INTERNAL MEDICINE

## 2019-06-14 RX ORDER — ALBUTEROL SULFATE 2.5 MG/.5ML
2.5 SOLUTION RESPIRATORY (INHALATION) EVERY 4 HOURS PRN
Status: DISCONTINUED | OUTPATIENT
Start: 2019-06-14 | End: 2019-06-15 | Stop reason: HOSPADM

## 2019-06-14 RX ADMIN — ALBUTEROL SULFATE 2.5 MG: 2.5 SOLUTION RESPIRATORY (INHALATION) at 09:06

## 2019-06-20 ENCOUNTER — TELEPHONE (OUTPATIENT)
Dept: PULMONOLOGY | Facility: CLINIC | Age: 79
End: 2019-06-20

## 2019-06-20 NOTE — TELEPHONE ENCOUNTER
----- Message from Jurgen Johnson sent at 6/20/2019  9:57 AM CDT -----  Contact: Self: [phone]  Type:  Test Results    Who Called: Self    Name of Test (Lab/Mammo/Etc): Sleep Study    Date of Test: 06/14/2019    Ordering Provider: Dr. Stinson    Where the test was performed:[facility]      Would the patient rather a call back or a response via [facility]? Callback    Best Call Back Number: [phone]    Additional Information:  N/A

## 2019-06-24 ENCOUNTER — TELEPHONE (OUTPATIENT)
Dept: PULMONOLOGY | Facility: CLINIC | Age: 79
End: 2019-06-24

## 2019-06-24 NOTE — TELEPHONE ENCOUNTER
----- Message from Dorys Fernandez MA sent at 6/24/2019 12:16 PM CDT -----  Contact: JAILENE JIMENES       ----- Message -----  From: Tania Delgado  Sent: 6/24/2019  10:50 AM  To: Milad Castro Staff    Name of Who is Calling: JAILENE JIMENES       What is the request in detail: Patient is requesting a call back concerning his test results       Can the clinic reply by [facility]: no      What Number to Call Back if not in [facility]: [phone]

## 2019-06-24 NOTE — TELEPHONE ENCOUNTER
Return pt's call but pt at [facility]'s and not available to talk, will call pt back later.

## 2019-07-05 ENCOUNTER — TELEPHONE (OUTPATIENT)
Dept: FAMILY MEDICINE | Facility: CLINIC | Age: 79
End: 2019-07-05

## 2019-07-05 NOTE — TELEPHONE ENCOUNTER
Spoke with pt requesting medication for a nasal drip.   Pt states he's tried Flonase and coricidin HBP. States medication is not effective.   Please advise.

## 2019-07-05 NOTE — TELEPHONE ENCOUNTER
Recommend he try otc zyrtec and saline nasal spray twice a day to clean the pollen out of his nose.

## 2019-07-05 NOTE — TELEPHONE ENCOUNTER
----- Message from Dorys Mosqueda sent at 7/5/2019  1:52 PM CDT -----  Contact: Patient ph [phone]  Type: Patient Call Back    Who called: Patient    What is the request in detail: Patient states that his nose has been running constantly for the last 4 hours. Used flonase and coricidin hvp and that's not helping. Asking to speak to nurse or dr.     Would the patient rather a call back or a response via [facility]? Call back    Best call back number: [phone]

## 2019-07-19 LAB
CHOL/HDLC RATIO: 3.7
CHOLESTEROL, TOTAL: 100
HBA1C MFR BLD: 7.9 % (ref 4–5.6)
HDLC SERPL-MCNC: 27 MG/DL
LDLC SERPL CALC-MCNC: 54 MG/DL
NONHDLC SERPL-MCNC: 73 MG/DL
TRIGL SERPL-MCNC: 100 MG/DL

## 2019-07-30 ENCOUNTER — TELEPHONE (OUTPATIENT)
Dept: PULMONOLOGY | Facility: CLINIC | Age: 79
End: 2019-07-30

## 2019-07-30 DIAGNOSIS — J44.9 COPD MIXED TYPE: Primary | ICD-10-CM

## 2019-07-30 NOTE — TELEPHONE ENCOUNTER
Pt inform of walk test, pt has home oxygen. Pt with reduce FEV1 on pft. Reports persistent symptoms on incruse. Start anoro. Pt also to f/u with cardiology. Pt awaiting sleep study. Checked with reauthorization department

## 2019-07-30 NOTE — TELEPHONE ENCOUNTER
----- Message from Rachael Main MA sent at 7/30/2019 11:32 AM CDT -----  Contact: Self   Please advise  ----- Message -----  From: Irina Nunez  Sent: 7/30/2019  10:44 AM  To: Mil[facility] Castro Staff    Type: Patient Call Back    Who called: Self     What is the request in detail: patient would like to know if he should be on both medication   albuterol (PROVENTIL/VENTOLIN HFA) 90 mcg/actuation inhaler  umeclidinium (INCRUSE ELLIPTA) 62.5 mcg/actuation DsDv    Can the clinic reply by [facility]? No     Would the patient rather a call back or a response via [facility]?  Call     Best call back number: [phone]

## 2019-08-02 ENCOUNTER — TELEPHONE (OUTPATIENT)
Dept: ADMINISTRATIVE | Facility: HOSPITAL | Age: 79
End: 2019-08-02

## 2019-08-02 RX ORDER — FUROSEMIDE 40 MG/1
40 TABLET ORAL
COMMUNITY
Start: 2019-04-17 | End: 2019-11-06 | Stop reason: SDUPTHER

## 2019-08-02 RX ORDER — PRAVASTATIN SODIUM 20 MG/1
20 TABLET ORAL
COMMUNITY
Start: 2019-04-17 | End: 2019-11-06 | Stop reason: SDUPTHER

## 2019-08-02 RX ORDER — WARFARIN SODIUM 5 MG/1
TABLET ORAL
COMMUNITY
Start: 2019-04-17 | End: 2019-11-06 | Stop reason: SDUPTHER

## 2019-08-02 RX ORDER — CLOPIDOGREL BISULFATE 75 MG/1
75 TABLET ORAL
COMMUNITY
Start: 2019-04-17 | End: 2019-11-06 | Stop reason: SDUPTHER

## 2019-08-02 RX ORDER — CARVEDILOL 25 MG/1
25 TABLET ORAL
COMMUNITY
Start: 2019-04-17 | End: 2019-08-09 | Stop reason: SDUPTHER

## 2019-08-02 RX ORDER — LOSARTAN POTASSIUM 100 MG/1
100 TABLET ORAL
COMMUNITY
Start: 2019-04-17 | End: 2019-08-13 | Stop reason: SDUPTHER

## 2019-08-02 RX ORDER — SPIRONOLACTONE 25 MG/1
25 TABLET ORAL
COMMUNITY
Start: 2019-04-17 | End: 2019-11-06 | Stop reason: SDUPTHER

## 2019-08-05 ENCOUNTER — TELEPHONE (OUTPATIENT)
Dept: PULMONOLOGY | Facility: CLINIC | Age: 79
End: 2019-08-05

## 2019-08-05 ENCOUNTER — OFFICE VISIT (OUTPATIENT)
Dept: FAMILY MEDICINE | Facility: CLINIC | Age: 79
End: 2019-08-05
Payer: MEDICARE

## 2019-08-05 VITALS
WEIGHT: 187.81 LBS | SYSTOLIC BLOOD PRESSURE: 150 MMHG | BODY MASS INDEX: 28.46 KG/M2 | RESPIRATION RATE: 16 BRPM | TEMPERATURE: 98 F | HEIGHT: 68 IN | DIASTOLIC BLOOD PRESSURE: 73 MMHG | HEART RATE: 59 BPM | OXYGEN SATURATION: 95 %

## 2019-08-05 DIAGNOSIS — H10.503 BLEPHAROCONJUNCTIVITIS OF BOTH EYES, UNSPECIFIED BLEPHAROCONJUNCTIVITIS TYPE: Primary | ICD-10-CM

## 2019-08-05 DIAGNOSIS — R09.81 NASAL CONGESTION: ICD-10-CM

## 2019-08-05 PROCEDURE — 3288F PR FALLS RISK ASSESSMENT DOCUMENTED: ICD-10-PCS | Mod: CPTII,S$GLB,, | Performed by: NURSE PRACTITIONER

## 2019-08-05 PROCEDURE — 99999 PR PBB SHADOW E&M-EST. PATIENT-LVL V: CPT | Mod: PBBFAC,,, | Performed by: NURSE PRACTITIONER

## 2019-08-05 PROCEDURE — 1100F PR PT FALLS ASSESS DOC 2+ FALLS/FALL W/INJURY/YR: ICD-10-PCS | Mod: CPTII,S$GLB,, | Performed by: NURSE PRACTITIONER

## 2019-08-05 PROCEDURE — 3078F DIAST BP <80 MM HG: CPT | Mod: CPTII,S$GLB,, | Performed by: NURSE PRACTITIONER

## 2019-08-05 PROCEDURE — 3078F PR MOST RECENT DIASTOLIC BLOOD PRESSURE < 80 MM HG: ICD-10-PCS | Mod: CPTII,S$GLB,, | Performed by: NURSE PRACTITIONER

## 2019-08-05 PROCEDURE — 3288F FALL RISK ASSESSMENT DOCD: CPT | Mod: CPTII,S$GLB,, | Performed by: NURSE PRACTITIONER

## 2019-08-05 PROCEDURE — 3077F SYST BP >= 140 MM HG: CPT | Mod: CPTII,S$GLB,, | Performed by: NURSE PRACTITIONER

## 2019-08-05 PROCEDURE — 99213 OFFICE O/P EST LOW 20 MIN: CPT | Mod: S$GLB,,, | Performed by: NURSE PRACTITIONER

## 2019-08-05 PROCEDURE — 99999 PR PBB SHADOW E&M-EST. PATIENT-LVL V: ICD-10-PCS | Mod: PBBFAC,,, | Performed by: NURSE PRACTITIONER

## 2019-08-05 PROCEDURE — 1100F PTFALLS ASSESS-DOCD GE2>/YR: CPT | Mod: CPTII,S$GLB,, | Performed by: NURSE PRACTITIONER

## 2019-08-05 PROCEDURE — 99213 PR OFFICE/OUTPT VISIT, EST, LEVL III, 20-29 MIN: ICD-10-PCS | Mod: S$GLB,,, | Performed by: NURSE PRACTITIONER

## 2019-08-05 PROCEDURE — 3077F PR MOST RECENT SYSTOLIC BLOOD PRESSURE >= 140 MM HG: ICD-10-PCS | Mod: CPTII,S$GLB,, | Performed by: NURSE PRACTITIONER

## 2019-08-05 RX ORDER — OLOPATADINE HYDROCHLORIDE 1 MG/ML
1 SOLUTION/ DROPS OPHTHALMIC 2 TIMES DAILY
Qty: 5 ML | Refills: 0 | Status: SHIPPED | OUTPATIENT
Start: 2019-08-05 | End: 2020-01-06

## 2019-08-05 RX ORDER — AZELASTINE 1 MG/ML
1 SPRAY, METERED NASAL 2 TIMES DAILY
Qty: 30 ML | Refills: 2 | Status: SHIPPED | OUTPATIENT
Start: 2019-08-05 | End: 2019-08-05 | Stop reason: SDUPTHER

## 2019-08-05 RX ORDER — OLOPATADINE HYDROCHLORIDE 1 MG/ML
1 SOLUTION/ DROPS OPHTHALMIC 2 TIMES DAILY
Qty: 5 ML | Refills: 0 | Status: SHIPPED | OUTPATIENT
Start: 2019-08-05 | End: 2019-08-05 | Stop reason: SDUPTHER

## 2019-08-05 RX ORDER — AZELASTINE 1 MG/ML
1 SPRAY, METERED NASAL 2 TIMES DAILY
Qty: 30 ML | Refills: 2 | Status: SHIPPED | OUTPATIENT
Start: 2019-08-05 | End: 2021-05-13

## 2019-08-05 RX ORDER — HYDROXYZINE HYDROCHLORIDE 25 MG/1
25 TABLET, FILM COATED ORAL 2 TIMES DAILY PRN
Qty: 30 TABLET | Refills: 0 | Status: SHIPPED | OUTPATIENT
Start: 2019-08-05 | End: 2020-01-06

## 2019-08-05 RX ORDER — HYDROXYZINE HYDROCHLORIDE 25 MG/1
25 TABLET, FILM COATED ORAL 2 TIMES DAILY PRN
Qty: 30 TABLET | Refills: 0 | Status: SHIPPED | OUTPATIENT
Start: 2019-08-05 | End: 2019-08-05 | Stop reason: SDUPTHER

## 2019-08-05 NOTE — PROGRESS NOTES
Patient Name: Percy Ramirez    : 1940  MRN: [id number]    Subjective:  Percy is a 78 y.o. male who presents today for     1. Itching x 3 days to upper eyelids bilaterally. Not sure if he was in contact with anything unusual. Reports crusty in am.     Past Medical History  Past Medical History:   Diagnosis Date    Allergy     Arthritis     CAD, multiple vessel     DR Melissa    Cataract     Depression     Hyperlipidemia     Hypertension     Macular degeneration     Dr Razo for injection, Jennifer for eye    S/P CABG x 2     Type 2 diabetes mellitus with circulatory disorder     Dr. Aquino       Past Surgical History  Past Surgical History:   Procedure Laterality Date    broken elbow      left    CARPAL METACARPAL INTERPOSITIONAL ARTHROPLASTY-THUMB Right 2017    [facility]    COLONOSCOPY N/A 10/4/2017    [facility] ENDO    EYE SURGERY      cataract    heart bypass      2004    HERNIA REPAIR      right    RECONSTRUCTION-TENDON-FLEXOR CARPI RADIAS Right 2017    [facility]    ROTATOR CUFF REPAIR      right         Family History  Family History   Problem Relation Age of Onset    Arthritis Mother     Diabetes Mother     Stroke Mother     Heart attack Father     Cancer Brother     Throat cancer Brother     Diabetes Maternal Aunt     Diabetes Maternal Uncle     Heart disease Maternal Uncle     Diabetes Paternal Aunt     Heart disease Paternal Aunt     Heart disease Paternal Uncle     Heart disease Maternal Grandmother     Cancer Maternal Grandfather        Social History  Social History     Socioeconomic History    Marital status:      Spouse name: Not on file    Number of children: 4    Years of education: GED    Highest education level: Not on file   Occupational History    Occupation: retired tug    Social Needs    Financial resource  "strain: Not on file    Food insecurity:     Worry: Not on file     Inability: Not on file    Transportation needs:     Medical: Not on file     Non-medical: Not on file   Tobacco Use    Smoking status: Former Smoker     Packs/day: 3.00     Years: 45.00     Pack years: 135.00     Types: Cigarettes     Last attempt to quit: 4/20/2004     Years since quitting: 15.3    Smokeless tobacco: Never Used   Substance and Sexual Activity    Alcohol use: Yes     Comment: was heavy drinker 35 years ago, rare use now    Drug use: No    Sexual activity: Yes     Partners: Female   Lifestyle    Physical activity:     Days per week: Not on file     Minutes per session: Not on file    Stress: Not on file   Relationships    Social connections:     Talks on phone: Not on file     Gets together: Not on file     Attends Advent service: Not on file     Active member of club or organization: Not on file     Attends meetings of clubs or organizations: Not on file     Relationship status: Not on file   Other Topics Concern    Not on file   Social History Narrative    Not on file       Allergies  Review of patient's allergies indicates:   Allergen Reactions    Aricept odt [donepezil] Diarrhea and Nausea And Vomiting    Codeine Nausea Only     weak    Ranexa [ranolazine]     -reviewed and updated      Medications  Reviewed and updated.   Current Outpatient Medications   Medication Sig Dispense Refill    albuterol (PROVENTIL/VENTOLIN HFA) 90 mcg/actuation inhaler INHALE 2 PUFFS BY MOUTH EVERY 6 HOURS AS NEEDED FOR WHEEZING AND FOR SHORTNESS OF BREATH 18 each 0    azelastine (ASTELIN) 137 mcg (0.1 %) nasal spray 1 spray (137 mcg total) by Nasal route 2 (two) times daily. 30 mL 2    BD INSULIN PEN NEEDLE UF SHORT 31 gauge x 5/16" Ndle       BD INSULIN SYRINGE ULTRA-FINE 1/2 mL 31 gauge x 15/64" Syrg       carvedilol (COREG) 25 MG tablet Take 1 tablet (25 mg total) by mouth 2 (two) times daily. 180 tablet 1    carvedilol " "(COREG) 25 MG tablet Take 25 mg by mouth.      cinnamon bark 500 mg capsule Take 1,000 mg by mouth once daily.        clopidogrel (PLAVIX) 75 mg tablet Take 75 mg by mouth once daily.      clopidogrel (PLAVIX) 75 mg tablet Take 75 mg by mouth.      donepezil (ARICEPT) 10 MG tablet Take 1 tablet (10 mg total) by mouth every evening. 30 tablet 11    DULoxetine (CYMBALTA) 30 MG capsule Take 1 capsule (30 mg total) by mouth once daily. 90 capsule 1    furosemide (LASIX) 40 MG tablet Take 40 mg by mouth once daily.       furosemide (LASIX) 40 MG tablet Take 40 mg by mouth.      gabapentin (NEURONTIN) 300 MG capsule TAKE 2 CAPSULES BY MOUTH 3 TIMES DAILY 540 capsule 1    glimepiride (AMARYL) 4 MG tablet Take 4 mg by mouth daily with breakfast.       hydrOXYzine HCl (ATARAX) 25 MG tablet Take 1 tablet (25 mg total) by mouth 2 (two) times daily as needed for Itching. (caution drowsiness) 30 tablet 0    insulin NPH-insulin regular, 70/30, (NOVOLIN 70/30) 100 unit/mL (70-30) injection Inject into the skin. Inject 5 units at breakfast and inject 5 units at night      insulin syringe-needle U-100 1/2 mL 31 x 5/16" Syrg       losartan (COZAAR) 100 MG tablet Take 1 tablet (100 mg total) by mouth once daily. 90 tablet 1    losartan (COZAAR) 100 MG tablet Take 100 mg by mouth.      meclizine (ANTIVERT) 12.5 mg tablet Take 1 tablet (12.5 mg total) by mouth 3 (three) times daily as needed for Dizziness. 252 tablet 1    metformin (GLUCOPHAGE) 500 MG tablet Take 1,000 mg by mouth 2 (two) times daily with meals. 2 Tablets in the morning, 2 Tablets in the evening      nitroGLYCERIN (NITROSTAT) 0.3 MG SL tablet PLACE 1 TABLET UNDER TONGUE AS NEEDED FOR CHEST PAIN EVERY 5 MINUTES, UP TO 3 DOSES IN 15MINUTES 100 tablet 0    olopatadine (PATANOL) 0.1 % ophthalmic solution Place 1 drop into both eyes 2 (two) times daily. 5 mL 0    OM-3/DHA/EPA/FISH OIL/VIT D3 (FISH OIL-VIT D3 ORAL) Take 2,000 Units by mouth once daily.    " "   pravastatin (PRAVACHOL) 20 MG tablet Take 1 tablet (20 mg total) by mouth once daily. 90 tablet 1    pravastatin (PRAVACHOL) 20 MG tablet Take 20 mg by mouth.      rivastigmine tartrate (EXELON) 1.5 MG capsule Take 1 capsule (1.5 mg total) by mouth 2 (two) times daily. 180 capsule 3    spironolactone (ALDACTONE) 25 MG tablet 25 mg once daily.       spironolactone (ALDACTONE) 25 MG tablet Take 25 mg by mouth.      umeclidinium-vilanterol (ANORO ELLIPTA) 62.5-25 mcg/actuation DsDv Inhale 1 puff into the lungs once daily. Controller (stop incruse) 1 each 5    VIT C/VIT E AC/LUT/COPPER/ZINC (PRESERVISION LUTEIN ORAL) Take by mouth.      warfarin (COUMADIN) 5 MG tablet Take 2 tabs by mouth on Tuesday,Thursday, Saturday and Sundays then 1.5 on all other days.      warfarin (COUMADIN) 5 MG tablet Take 2 tablets ( 10 mg) on Mon & Fri; Take 1 and 1/2 tablets ( 7.5 mg ) all other days      warfarin (COUMADIN) 7.5 MG tablet Take by mouth.       No current facility-administered medications for this visit.          Review of Systems   Constitutional: Negative for chills and fever.   HENT: Negative for congestion and sore throat.    Eyes: Positive for discharge. Negative for blurred vision, double vision, pain and redness.        Bilateral itchy eyes   Respiratory: Negative for cough.    Cardiovascular: Negative for chest pain.   Neurological: Negative for headaches.         Physical Exam  Blood Pressure (Abnormal) 150/73 (BP Location: Left arm, Patient Position: Sitting, BP Method: Medium (Automatic))   Pulse (Abnormal) 59   Temperature 98.3 °F (36.8 °C) (Oral)   Respiration 16   Height 5' 8" (1.727 m)   Weight 85.2 kg (187 lb 13.3 oz)   Oxygen Saturation 95%   Body Mass Index 28.56 kg/m²   Physical Exam   Constitutional: He is oriented to person, place, and time. He appears well-developed. No distress.   HENT:   Head: Normocephalic.   Mouth/Throat: Oropharynx is clear and moist.   +Nasal congestion    Eyes: " Pupils are equal, round, and reactive to light. Conjunctivae and EOM are normal.   Bilateral upper eyelids slightly erythematous,  patient was rubbing frequently during visit, no obvious edema   Neck: Neck supple.   Cardiovascular: Normal rate and regular rhythm.   Pulmonary/Chest: Effort normal and breath sounds normal.   Neurological: He is alert and oriented to person, place, and time.   Skin: He is not diaphoretic.   Psychiatric: He has a normal mood and affect. His behavior is normal.         Assessment/Plan:  Percy Ramirez is a 78 y.o. male who presents today for :    Blepharoconjunctivitis of both eyes, unspecified blepharoconjunctivitis type  Probably contact/allergic induced, trial patanol and atarax to manage symptoms  -     Discontinue: olopatadine (PATANOL) 0.1 % ophthalmic solution; Place 1 drop into both eyes 2 (two) times daily.  Dispense: 5 mL; Refill: 0  -     Discontinue: hydrOXYzine HCl (ATARAX) 25 MG tablet; Take 1 tablet (25 mg total) by mouth 2 (two) times daily as needed for Itching. (caution drowsiness)  Dispense: 30 tablet; Refill: 0  -     hydrOXYzine HCl (ATARAX) 25 MG tablet; Take 1 tablet (25 mg total) by mouth 2 (two) times daily as needed for Itching. (caution drowsiness)  Dispense: 30 tablet; Refill: 0  -     olopatadine (PATANOL) 0.1 % ophthalmic solution; Place 1 drop into both eyes 2 (two) times daily.  Dispense: 5 mL; Refill: 0    Nasal congestion  Recommend trial antihistamine spray  -     Discontinue: azelastine (ASTELIN) 137 mcg (0.1 %) nasal spray; 1 spray (137 mcg total) by Nasal route 2 (two) times daily.  Dispense: 30 mL; Refill: 2  -     azelastine (ASTELIN) 137 mcg (0.1 %) nasal spray; 1 spray (137 mcg total) by Nasal route 2 (two) times daily.  Dispense: 30 mL; Refill: 2        Follow up follow up with optometrist for persistent symptoms .

## 2019-08-05 NOTE — TELEPHONE ENCOUNTER
----- Message from Mahsa Tapia sent at 8/5/2019 10:25 AM CDT -----  Contact: Self [phone]  Type: Patient Call Back    Who called:Self    What is the request in detail: pt is calling because he does not know if the medication that she placed him on is giving him an allergic reaction because his eyes are itching and burning really bad he describes it as he wants to cut his eye lids off    Can the clinic reply by [facility]? Call back    Would the patient rather a call back or a response via [facility]? Call back    Best call back number:[phone]

## 2019-08-12 ENCOUNTER — TELEPHONE (OUTPATIENT)
Dept: FAMILY MEDICINE | Facility: CLINIC | Age: 79
End: 2019-08-12

## 2019-08-12 NOTE — TELEPHONE ENCOUNTER
----- Message from Dorys Mosqueda sent at 8/12/2019  8:41 AM CDT -----  Contact: Nabil haley/ROBERT Barnhart  [phone] REF # [id number]  Type:  Pharmacy Calling to Clarify an RX    Name of Caller:  Nabil     Pharmacy Name: [facility]    Prescription Name:  carvedilol (COREG) 25 MG tablet    What do they need to clarify? Patient has 2 Rxs. Dr. Edmondson wrote for 1 bid and the other was written by a different prescriber, Dr. Aqiuno,  for one daily.    Best Call Back Number: [phone] REF # [id number]

## 2019-08-12 NOTE — TELEPHONE ENCOUNTER
----- Message from Kasie Edmondson MD sent at 8/12/2019  1:01 PM CDT -----  Contact: Nabil haley/[employer]  [phone] REF # [id number]  Please call patient to clarify how he takes this medication.  ----- Message -----  From: Katherine Ryan MD  Sent: 8/12/2019  11:49 AM  To: Kasie Edmondson MD        ----- Message -----  From: Joann Alves LPN  Sent: 8/12/2019  11:31 AM  To: Katherine Ryan MD    Message sent from [facility], pt. Has 2 active prescriptions with refills on file. Pharmacy wanting to know which one to fill, spoke with Jessa. Please advise.  ----- Message -----  From: Dorys Mosqueda  Sent: 8/12/2019   8:41 AM  To: Delvis JACOB Staff    Type:  Pharmacy Calling to Clarify an RX    Name of Caller:  Nabil     Pharmacy Name: [facility]    Prescription Name:  carvedilol (COREG) 25 MG tablet    What do they need to clarify? Patient has 2 Rxs. Dr. Edmondson wrote for 1 bid and the other was written by a different prescriber, Dr. Aquino,  for one daily.    Best Call Back Number: [phone] REF # [id number]

## 2019-08-13 RX ORDER — CARVEDILOL 25 MG/1
25 TABLET ORAL 2 TIMES DAILY
Qty: 180 TABLET | Refills: 0 | Status: SHIPPED | OUTPATIENT
Start: 2019-08-13

## 2019-08-13 RX ORDER — LOSARTAN POTASSIUM 100 MG/1
100 TABLET ORAL DAILY
Qty: 90 TABLET | Refills: 0 | Status: SHIPPED | OUTPATIENT
Start: 2019-08-13 | End: 2019-11-06 | Stop reason: SDUPTHER

## 2019-08-17 ENCOUNTER — HOSPITAL ENCOUNTER (OUTPATIENT)
Dept: SLEEP MEDICINE | Facility: HOSPITAL | Age: 79
Discharge: HOME | End: 2019-08-17
Attending: NURSE PRACTITIONER
Payer: MEDICARE

## 2019-08-17 DIAGNOSIS — G47.33 OBSTRUCTIVE SLEEP APNEA ON CPAP: ICD-10-CM

## 2019-08-17 PROCEDURE — 95811 POLYSOM 6/>YRS CPAP 4/> PARM: CPT | Mod: 26,,, | Performed by: INTERNAL MEDICINE

## 2019-08-17 PROCEDURE — 95811 POLYSOM 6/>YRS CPAP 4/> PARM: CPT

## 2019-08-17 PROCEDURE — 95811 PR POLYSOMNOGRAPHY W/CPAP: ICD-10-PCS | Mod: 26,,, | Performed by: INTERNAL MEDICINE

## 2019-08-18 NOTE — PROGRESS NOTES
A Titration study was performed on Percy Ramirez. The entire procedure was explained, including Bio calibration procedure.The  patient was informed that there may be a need to enter the room during the night to fix leads or make adjustments to the equipment. Questions were answered prior to start of study. He was given instructions on how to call out for help including how to use the nurse call unit in the bathroom.   Patient education was performed.. He was given the after visit summary.   The lowest oxygen saturation observed during sleep was 87%       EKG: The EKG appeared to be NSR, with PAC  Pt went to sleep with a towel covering his head, even after adjusting the air He sleeps with a towel at home due to being cold even with adjusting the air conditioner. Tech read questionnaire to pt because patient has vision problems and had trouble seeing paperwork.     The patient was titrated from a pressure of 4 cm of Cpap to 16/11 of Bipap pressure. A [employer] P Simplus full face mask was used with humidity and C-Flex. A full face mask was used due to the pt previously using a full face mask and stated he tried a nasal previously and did not like nasal mask. The pt was switched from Cpap to Bipap to try to eliminate apneas and for pt comfort. The pressure of Bipap 16/11 is  believed to eliminate most of the events.   Pt used the batrhoom 3 times during the night which is a normal routine at home.  His reaction to PAP was he was not as stressed from the pressure and machine he currently used.  He said he slept better and he likes the size and the fit of the mask.

## 2019-08-18 NOTE — PATIENT INSTRUCTIONS
Your sleep study will be scored and interpreted by one of our physicians who are board certified in sleep medicine.  Within two weeks the results will be sent to the physician who referred you. Your physician should then contact you to go over the results, along with any recommendations. If you do not hear from your physician within two weeks, please call them. Please make a note of your Bipap pressure. This is very important in case you need to stay in a hospital. You will need to know this pressure to let your Respiratory Therapist know how to set up your Bipap pressure.     F & P Simplus full face mask size Small is recommended, it will be ordered by your physician and a home health company will contact you for delivery and set up.  If you do not receive your Bipap in about two weeks, please notify your physician. You will need to use your Bipap machine every night to be compliant with your insurance company. If you have trouble using it, please call your home health company.    Please feel free to contact us with any questions or concerns regarding this process.    Once again, thank you for allowing us to serve you.  We can be reached at [phone].

## 2019-08-23 NOTE — PROCEDURES
"Dear Provider,     You have ordered sleep LAB services to perform the sleep study for Percy Ramirez.  The sleep study that you ordered is complete.      Please find Sleep Study result in "Chart Review" under the "Media tab."      As the ordering provider, you are responsible for reviewing the results and implementing a treatment plan with your patient.    If you need a Sleep Medicine provider to explain the sleep study findings and arrange treatment for the patient, please refer patient for consultation to our Sleep Clinic via [facility] with Ambulatory Consult Sleep.    To do that please place an order for an  "Ambulatory Consult Sleep" - it will go to our clinic work queue for our Medical Assistant to contact the patient for an appointment.     For any questions, please contact our clinic staff at [phone] to talk to clinical staff.   "

## 2019-08-29 ENCOUNTER — TELEPHONE (OUTPATIENT)
Dept: PULMONOLOGY | Facility: CLINIC | Age: 79
End: 2019-08-29

## 2019-08-29 NOTE — TELEPHONE ENCOUNTER
----- Message from Rachael Main MA sent at 8/28/2019  1:40 PM CDT -----  Contact: JAILENE JIMENES   Please advise  ----- Message -----  From: Tania Delgado  Sent: 8/28/2019  11:30 AM  To: Milad Castro Staff    Name of Who is Calling: JAILENE JIMENES       What is the request in detail:  Request call back about getting cheaper medication for umeclidinium-vilanterol (ANORO ELLIPTA) 62.5-25 mcg/actuation DsDv      Can the clinic reply by [facility]: no       What Number to Call Back if not in [facility]: [phone]

## 2019-09-16 ENCOUNTER — TELEPHONE (OUTPATIENT)
Dept: FAMILY MEDICINE | Facility: CLINIC | Age: 79
End: 2019-09-16

## 2019-09-16 NOTE — TELEPHONE ENCOUNTER
Spoke with patient and he wanted to know if he need the Shingle vaccine. Informed he per health maintenance he need the vaccine but we do not have any available at this time in the clinic. Patient said that he received Flu vaccine at [facility] 9/13/19. He will go back there to get Shingle vaccine.

## 2019-09-16 NOTE — TELEPHONE ENCOUNTER
----- Message from Raven Corey sent at 9/13/2019  4:09 PM CDT -----  Contact: Self   Type: Patient Call Back    Who called: self     What is the request in detail:patient is asking if he needs a shingles vaccine. Please advise     Can the clinic reply by [facility]? Call     Would the patient rather a call back or a response via [facility]?  Call  Best call back number: [phone]    Additional Information:

## 2019-09-18 ENCOUNTER — PATIENT OUTREACH (OUTPATIENT)
Dept: ADMINISTRATIVE | Facility: OTHER | Age: 79
End: 2019-09-18

## 2019-09-23 ENCOUNTER — OFFICE VISIT (OUTPATIENT)
Dept: PULMONOLOGY | Facility: CLINIC | Age: 79
End: 2019-09-23
Payer: MEDICARE

## 2019-09-23 VITALS
OXYGEN SATURATION: 92 % | HEIGHT: 68 IN | HEART RATE: 69 BPM | BODY MASS INDEX: 27.7 KG/M2 | SYSTOLIC BLOOD PRESSURE: 114 MMHG | WEIGHT: 182.75 LBS | DIASTOLIC BLOOD PRESSURE: 67 MMHG

## 2019-09-23 DIAGNOSIS — R91.1 PULMONARY NODULE: ICD-10-CM

## 2019-09-23 DIAGNOSIS — R06.09 DYSPNEA ON EXERTION: ICD-10-CM

## 2019-09-23 DIAGNOSIS — G47.33 OBSTRUCTIVE SLEEP APNEA TREATED WITH BIPAP: Primary | ICD-10-CM

## 2019-09-23 DIAGNOSIS — J44.9 COPD MIXED TYPE: ICD-10-CM

## 2019-09-23 PROCEDURE — 99999 PR PBB SHADOW E&M-EST. PATIENT-LVL V: ICD-10-PCS | Mod: PBBFAC,,, | Performed by: NURSE PRACTITIONER

## 2019-09-23 PROCEDURE — 99214 PR OFFICE/OUTPT VISIT, EST, LEVL IV, 30-39 MIN: ICD-10-PCS | Mod: S$GLB,,, | Performed by: NURSE PRACTITIONER

## 2019-09-23 PROCEDURE — 3074F PR MOST RECENT SYSTOLIC BLOOD PRESSURE < 130 MM HG: ICD-10-PCS | Mod: CPTII,S$GLB,, | Performed by: NURSE PRACTITIONER

## 2019-09-23 PROCEDURE — 99999 PR PBB SHADOW E&M-EST. PATIENT-LVL V: CPT | Mod: PBBFAC,,, | Performed by: NURSE PRACTITIONER

## 2019-09-23 PROCEDURE — 3078F PR MOST RECENT DIASTOLIC BLOOD PRESSURE < 80 MM HG: ICD-10-PCS | Mod: CPTII,S$GLB,, | Performed by: NURSE PRACTITIONER

## 2019-09-23 PROCEDURE — 1101F PT FALLS ASSESS-DOCD LE1/YR: CPT | Mod: CPTII,S$GLB,, | Performed by: NURSE PRACTITIONER

## 2019-09-23 PROCEDURE — 99214 OFFICE O/P EST MOD 30 MIN: CPT | Mod: S$GLB,,, | Performed by: NURSE PRACTITIONER

## 2019-09-23 PROCEDURE — 1101F PR PT FALLS ASSESS DOC 0-1 FALLS W/OUT INJ PAST YR: ICD-10-PCS | Mod: CPTII,S$GLB,, | Performed by: NURSE PRACTITIONER

## 2019-09-23 PROCEDURE — 3078F DIAST BP <80 MM HG: CPT | Mod: CPTII,S$GLB,, | Performed by: NURSE PRACTITIONER

## 2019-09-23 PROCEDURE — 3074F SYST BP LT 130 MM HG: CPT | Mod: CPTII,S$GLB,, | Performed by: NURSE PRACTITIONER

## 2019-09-23 NOTE — PATIENT INSTRUCTIONS
[facility]    Durable Medical Equipment -[facility] [phone] #3      Plan:   1. Start BIPAP therapy,  Compliance requirement on machine 4 hours or more 70% nights (22/30 days)  2. Exchange mask within 30 days if mask is uncomfortable  3. Follow up in 6 weeks after usage to assess effectiveness    Education: During our discussion today, we talked about the etiology of obstructive sleep apnea as well as the potential ramifications of untreated sleep apnea, which could include daytime sleepiness, dementia, cognitive impairment, hypertension, heart disease and/or stroke. We discussed potential treatment options, which could include weight loss, body positioning, oral appliances (OA), continuous positive airway pressure (CPAP), or referral for surgical consideration.     Behavior modification which includes losing weight, exercising, changing the sleep position, abstaining from alcohol, and avoiding certain medications    Precautions: The patient was advised to abstain from driving should they feel sleepy or drowsy

## 2019-09-23 NOTE — PROGRESS NOTES
CHIEF COMPLAINT:    Chief Complaint   Patient presents with    Results       HISTORY OF PRESENT ILLNESS: Percy Ramirez is a 78 y.o. male Former smoker with DM, CAD s/p CABG, PAF, ELINA on CPAP,  COPD is here for follow up sleep study. Patient with symptoms of persistent EDS and awakening to go to bathroom despite CPAP usage. ESS 21. Symptoms of snoring and apnea did resolved. Reports his overnight pulse oximetry reading is low without his cpap.     Reports chronic dyspnea on exertion, can walk less than 1 block, using rescue inhaler 1-2 x a day. Since last visit he has been trial on anoro which he is using daily without significant improvement. He does not use supplemental oxygen on ambulation as it is too cumbersome and he checks his pulse oximetry and his oxygen never drops below 88%. He would like to return his oxygen as he gets a monthly charge for this and not using it.     Split night study from  3/21/2017: AHI 31.7, Effective control acieved with cpap 14 cm H2O  CPAP titration study 8/17/2019:  Effective control of sleep disordered breathing was achieved with BPAP at 16/10 cm of water.     He obtains his supplies from [employer]. Wears oxygen at night as well.        PAST MEDICAL HISTORY:    Active Ambulatory Problems     Diagnosis Date Noted    Major depressive disorder, recurrent episode, mild     Hypertension     Type 2 diabetes, uncontrolled, with retinopathy     CAD, multiple vessel     S/P CABG x 2     Macular degeneration     Obstructive sleep apnea on CPAP 07/27/2012    Anxiety state, unspecified 10/24/2013    Insulin long-term use 02/16/2015    Primary osteoarthritis of both knees 02/25/2015    Chronic low back pain 02/25/2015    DJD (degenerative joint disease), lumbar 04/30/2015    Type 2 diabetes, uncontrolled, with neuropathy 07/29/2016    Bilateral carotid artery disease 07/29/2016    Hyperlipidemia LDL goal <100 07/29/2016    Type 2 diabetes, uncontrolled, with peripheral  circulatory disorder 07/29/2016    COPD mixed type 07/29/2016    Atherosclerosis of abdominal aorta 07/29/2016    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 07/29/2016    Overweight (BMI 25.0-29.9) 07/29/2016    Paroxysmal atrial fibrillation 03/06/2017    Chronic stable angina 03/06/2017    Senile purpura 03/06/2017    Osteoarthritis of carpometacarpal (CMC) joint of left thumb 11/22/2017    MCI (mild cognitive impairment) 06/18/2018    Chronic vertigo 02/11/2019    Pulmonary nodule 03/20/2019    Dyspnea on exertion 03/20/2019       Past Medical History:   Diagnosis Date    Allergy     Arthritis     CAD, multiple vessel     Cataract     Depression     Hyperlipidemia     Hypertension     Macular degeneration     S/P CABG x 2     Type 2 diabetes mellitus with circulatory disorder                 PAST SURGICAL HISTORY:    Past Surgical History:   Procedure Laterality Date    broken elbow      left    COLONOSCOPY N/A 10/4/2017    Procedure: COLONOSCOPY;  Surgeon: Gabriel Leung MD;  Location[facility];  Service: Endoscopy;  Laterality: N/A;    EYE SURGERY      cataract    heart bypass      2004    HERNIA REPAIR      right    ROTATOR CUFF REPAIR      right  2004         FAMILY HISTORY:                Family History   Problem Relation Age of Onset    Arthritis Mother     Diabetes Mother     Stroke Mother     Heart attack Father     Cancer Brother     Throat cancer Brother     Diabetes Maternal Aunt     Diabetes Maternal Uncle     Heart disease Maternal Uncle     Diabetes Paternal Aunt     Heart disease Paternal Aunt     Heart disease Paternal Uncle     Heart disease Maternal Grandmother     Cancer Maternal Grandfather        SOCIAL HISTORY:          Tobacco:   Social History     Tobacco Use   Smoking Status Former Smoker    Packs/day: 3.00    Years: 45.00    Pack years: 135.00    Types: Cigarettes    Last attempt to quit: 4/20/2004     "Years since quitting: 15.4   Smokeless Tobacco Never Used       alcohol use:    Social History     Substance and Sexual Activity   Alcohol Use Yes    Comment: was heavy drinker 35 years ago, rare use now                   ALLERGIES:    Review of patient's allergies indicates:   Allergen Reactions    Aricept odt [donepezil] Diarrhea and Nausea And Vomiting    Codeine Nausea Only     weak    Ranexa [ranolazine]        CURRENT MEDICATIONS:    Current Outpatient Medications   Medication Sig Dispense Refill    albuterol (PROVENTIL/VENTOLIN HFA) 90 mcg/actuation inhaler INHALE 2 PUFFS BY MOUTH EVERY 6 HOURS AS NEEDED FOR WHEEZING AND FOR SHORTNESS OF BREATH 18 each 0    azelastine (ASTELIN) 137 mcg (0.1 %) nasal spray 1 spray (137 mcg total) by Nasal route 2 (two) times daily. 30 mL 2    BD INSULIN PEN NEEDLE UF SHORT 31 gauge x 5/16" Ndle       BD INSULIN SYRINGE ULTRA-FINE 1/2 mL 31 gauge x 15/64" Syrg       carvedilol (COREG) 25 MG tablet Take 1 tablet (25 mg total) by mouth 2 (two) times daily. 180 tablet 0    cinnamon bark 500 mg capsule Take 1,000 mg by mouth once daily.        clopidogrel (PLAVIX) 75 mg tablet Take 75 mg by mouth once daily.      clopidogrel (PLAVIX) 75 mg tablet Take 75 mg by mouth.      donepezil (ARICEPT) 10 MG tablet Take 1 tablet (10 mg total) by mouth every evening. 30 tablet 11    DULoxetine (CYMBALTA) 30 MG capsule Take 1 capsule (30 mg total) by mouth once daily. 90 capsule 1    furosemide (LASIX) 40 MG tablet Take 40 mg by mouth once daily.       furosemide (LASIX) 40 MG tablet Take 40 mg by mouth.      gabapentin (NEURONTIN) 300 MG capsule TAKE 2 CAPSULES BY MOUTH 3 TIMES DAILY 540 capsule 1    glimepiride (AMARYL) 4 MG tablet Take 4 mg by mouth daily with breakfast.       hydrOXYzine HCl (ATARAX) 25 MG tablet Take 1 tablet (25 mg total) by mouth 2 (two) times daily as needed for Itching. (caution drowsiness) 30 tablet 0    insulin NPH-insulin regular, 70/30, (NOVOLIN " "70/30) 100 unit/mL (70-30) injection Inject into the skin. Inject 5 units at breakfast and inject 5 units at night      insulin syringe-needle U-100 1/2 mL 31 x 5/16" Syrg       losartan (COZAAR) 100 MG tablet Take 1 tablet (100 mg total) by mouth once daily. 90 tablet 0    metformin (GLUCOPHAGE) 500 MG tablet Take 1,000 mg by mouth 2 (two) times daily with meals. 2 Tablets in the morning, 2 Tablets in the evening      nitroGLYCERIN (NITROSTAT) 0.3 MG SL tablet PLACE 1 TABLET UNDER TONGUE AS NEEDED FOR CHEST PAIN EVERY 5 MINUTES, UP TO 3 DOSES IN 15MINUTES 100 tablet 0    olopatadine (PATANOL) 0.1 % ophthalmic solution Place 1 drop into both eyes 2 (two) times daily. 5 mL 0    OM-3/DHA/EPA/FISH OIL/VIT D3 (FISH OIL-VIT D3 ORAL) Take 2,000 Units by mouth once daily.       pravastatin (PRAVACHOL) 20 MG tablet Take 1 tablet (20 mg total) by mouth once daily. 90 tablet 1    pravastatin (PRAVACHOL) 20 MG tablet Take 20 mg by mouth.      rivastigmine tartrate (EXELON) 1.5 MG capsule Take 1 capsule (1.5 mg total) by mouth 2 (two) times daily. 180 capsule 3    spironolactone (ALDACTONE) 25 MG tablet 25 mg once daily.       spironolactone (ALDACTONE) 25 MG tablet Take 25 mg by mouth.      umeclidinium-vilanterol (ANORO ELLIPTA) 62.5-25 mcg/actuation DsDv Inhale 1 puff into the lungs once daily. Controller (stop incruse) 1 each 5    VIT C/VIT E AC/LUT/COPPER/ZINC (PRESERVISION LUTEIN ORAL) Take by mouth.      warfarin (COUMADIN) 5 MG tablet Take 2 tabs by mouth on Tuesday,Thursday, Saturday and Sundays then 1.5 on all other days.      warfarin (COUMADIN) 5 MG tablet Take 2 tablets ( 10 mg) on Mon & Fri; Take 1 and 1/2 tablets ( 7.5 mg ) all other days      warfarin (COUMADIN) 7.5 MG tablet Take by mouth.      meclizine (ANTIVERT) 12.5 mg tablet Take 1 tablet (12.5 mg total) by mouth 3 (three) times daily as needed for Dizziness. 252 tablet 1     No current facility-administered medications for this visit.     " "              REVIEW OF SYSTEMS:   Review of Systems   Constitutional: Negative for fever, chills and activity change.   HENT: Positive for postnasal drip and congestion (right nare stopped up all the time).    Respiratory: Positive for apnea (resole with cpap), snoring (resolve with cpap), cough (minimal dry cough), dyspnea on extertion (1 block), use of rescue inhaler (1-2 a day) and somnolence. Negative for chest tightness, wheezing and Paroxysmal Nocturnal Dyspnea.    Cardiovascular: Positive for leg swelling. Negative for chest pain and palpitations.        Follow by Dr. Valderrama, last visit 1 month ago, "everything ok"    Gastrointestinal: Negative for acid reflux.   Psychiatric/Behavioral: Positive for sleep disturbance.        PHYSICAL EXAM:  Vitals:    09/23/19 1134   BP: 114/67   Pulse: 69   SpO2: (Abnormal) 92%   Weight: 82.9 kg (182 lb 12.2 oz)   Height: 5' 8" (1.727 m)   PainSc:   8   PainLoc: Leg     Body mass index is 27.79 kg/m².   Physical Exam   Constitutional: He is oriented to person, place, and time. He appears well-developed and well-nourished. No distress.   HENT:   Head: Normocephalic.   Nose: Nose normal.   Mouth/Throat: Oropharynx is clear and moist. Mallampati Score: II.   Neck: Neck supple.   Cardiovascular: Normal rate and regular rhythm.   Pulmonary/Chest: Normal expansion, effort normal and breath sounds normal.   Neurological: He is alert and oriented to person, place, and time. Gait normal.   Skin: He is not diaphoretic.   Psychiatric: He has a normal mood and affect. His behavior is normal.        ambulatory  on RA                                         DATA :  Pulmonary Functions Testing Results:  1/31/13 tlc 93%; dlco 46%  4/8/14 ratio 67%; fvc 76%; fve 73%  6/14/19 ratio 48%, fev 1 54.8 fvc 85.5  Walk test: 95/88/91  ABG: none  CXR:   Lungs clear  CT CHEST:  2/15/18 rll ggo and tib in lingula.  + emphysema repeat imaging scheduled 3/2020  PPD none        2-d echo 12/30/11  1.  The " right atrium and right ventricle are of normal dimension.            2.  Tricuspid regurgitation is mild.                                         3.  PA pressure 28-30 mmHg.                                                  4.  Enlargement of the left atrium.                                          5.  Normal mitral valve structure and valve excursion.  No mitral            stenosis.                                                                     No MR detected from this study.                                             6.  Normal left ventricular dimensions and systolic function, ejection       fraction 50%-55%.                                                            7.  Grade I left ventricular diastolic dysfunction.                          8.  Mild aortic sclerosis with good valve excursion.  No aortic stenosis.    Minimal aortic regurgitation.                                                9.  No pericardial effusion seen.       bnp 8/5/10 134;1/24/13 164       Split night study from  3/21/2017: AHI 31.7, Effective control acieved with cpap 14 cm H2O  CPAP titration study 8/17/19: Effective control of sleep disordered breathing was achieved with BPAP at 16/10 cm of water.     ASSESSMENT    ICD-10-CM ICD-9-CM    1. Obstructive sleep apnea treated with BiPAP G47.33 327.23 BIPAP FOR HOME USE   2. COPD mixed type J44.9 496 Six Minute Walk Test to qualify for Home Oxygen   3. Pulmonary nodule R91.1 793.11    4. Dyspnea on exertion R06.09 786.09 Six Minute Walk Test to qualify for Home Oxygen       PLAN:    Problem List Items Addressed This Visit     Unprioritized              Pulmonary nodule    Overview     Ct with 0.5 mm rll ggo.  Repeat ct in 1 year.  This has been scheduled for 3/2020           Obstructive sleep apnea treated with BiPAP - Primary    Overview     Patient with persistent EDS despite regular cpap usage. CPAP titration study on 8/17/2019 reveal effective treatment with bipap. Switch  equipment to bipap.            Relevant Orders    BIPAP FOR HOME USE    Dyspnea on exertion    Overview     multiple etiologies.  Cardiac (diastolic dysfunction, pulmonary htn) +/- copd +/- decondition.  Patient on anoro.  Cont with albuterol prn.  Patient quit smoking 2004.             Relevant Orders    Six Minute Walk Test to qualify for Home Oxygen    COPD mixed type    Overview     PFTs increasing severity based on increasing symptoms and reduced fev1, pt on anoro. No obvious evidence at this time for need oxygen. Will obtain walk test for confirmation and discontinue as patient not using treatment if treatment no longer warranted           Relevant Orders    Six Minute Walk Test to qualify for Home Oxygen              Follow up follow up 6 weeks following bipap usage.

## 2019-09-25 ENCOUNTER — PATIENT OUTREACH (OUTPATIENT)
Dept: ADMINISTRATIVE | Facility: HOSPITAL | Age: 79
End: 2019-09-25

## 2019-09-26 ENCOUNTER — TELEPHONE (OUTPATIENT)
Dept: FAMILY MEDICINE | Facility: CLINIC | Age: 79
End: 2019-09-26

## 2019-09-26 PROBLEM — Z78.9 MEDICATION INTOLERANCE: Status: ACTIVE | Noted: 2019-09-26

## 2019-09-26 PROBLEM — I50.22 CHRONIC SYSTOLIC HEART FAILURE: Status: ACTIVE | Noted: 2018-11-30

## 2019-09-26 PROBLEM — J96.11 CHRONIC RESPIRATORY FAILURE WITH HYPOXIA, ON HOME O2 THERAPY: Status: ACTIVE | Noted: 2019-09-26

## 2019-09-26 PROBLEM — Z99.81 CHRONIC RESPIRATORY FAILURE WITH HYPOXIA, ON HOME O2 THERAPY: Status: ACTIVE | Noted: 2019-09-26

## 2019-09-26 NOTE — TELEPHONE ENCOUNTER
The patient has completed all pneumonia vaccinations and is good for life. The only vaccine he is due for is a shingles vaccine. He is also due for his yearly eye exam.

## 2019-09-26 NOTE — TELEPHONE ENCOUNTER
----- Message from Julissa Dominguez MA sent at 9/26/2019  7:58 AM CDT -----  Contact: Self    Please advise  ----- Message -----  From: Irina Nunez  Sent: 9/26/2019   7:49 AM CDT  To: Delvis JACOB Staff    Type: Patient Call Back    Who called: Self     What is the request in detail:patient would like to know if he need to do a Pneumococcal  Injection     Can the clinic reply by [facility]?  No     Would the patient rather a call back or a response via [facility]?  Call     Best call back number:[phone]

## 2019-09-26 NOTE — TELEPHONE ENCOUNTER
Notified patient of message below, pt verbalized understanding. Says he gets his EYE EXAM October 2019.

## 2019-09-27 ENCOUNTER — TELEPHONE (OUTPATIENT)
Dept: FAMILY MEDICINE | Facility: CLINIC | Age: 79
End: 2019-09-27

## 2019-09-27 ENCOUNTER — TELEPHONE (OUTPATIENT)
Dept: PULMONOLOGY | Facility: CLINIC | Age: 79
End: 2019-09-27

## 2019-09-27 ENCOUNTER — HOSPITAL ENCOUNTER (OUTPATIENT)
Dept: RESPIRATORY THERAPY | Facility: HOSPITAL | Age: 79
Discharge: HOME | End: 2019-09-27
Attending: NURSE PRACTITIONER
Payer: MEDICARE

## 2019-09-27 DIAGNOSIS — J44.9 COPD MIXED TYPE: ICD-10-CM

## 2019-09-27 DIAGNOSIS — R06.09 DYSPNEA ON EXERTION: ICD-10-CM

## 2019-09-27 DIAGNOSIS — I50.9 CONGESTIVE HEART FAILURE, UNSPECIFIED HF CHRONICITY, UNSPECIFIED HEART FAILURE TYPE: Primary | ICD-10-CM

## 2019-09-27 PROCEDURE — 94618 PULMONARY STRESS TESTING: CPT

## 2019-09-27 RX ORDER — BUDESONIDE AND FORMOTEROL FUMARATE DIHYDRATE 80; 4.5 UG/1; UG/1
2 AEROSOL RESPIRATORY (INHALATION) 2 TIMES DAILY
Qty: 10.2 G | Refills: 5 | Status: SHIPPED | OUTPATIENT
Start: 2019-09-27 | End: 2019-10-03

## 2019-09-27 RX ORDER — BUDESONIDE AND FORMOTEROL FUMARATE DIHYDRATE 80; 4.5 UG/1; UG/1
2 AEROSOL RESPIRATORY (INHALATION) 2 TIMES DAILY
Qty: 1 INHALER | Refills: 5 | Status: SHIPPED | OUTPATIENT
Start: 2019-09-27 | End: 2019-09-27 | Stop reason: SDUPTHER

## 2019-09-27 NOTE — TELEPHONE ENCOUNTER
Pt states with insurance the anoro is 130 dollars and that he will be in the donut hole, changed script to symbicort.  slight increase risk pneumonia. rinse mouth out afterwards.  Contact us if his symptoms worsen or does not improve.

## 2019-09-27 NOTE — TELEPHONE ENCOUNTER
----- Message from Stefani Pa sent at 9/27/2019 12:47 PM CDT -----  Contact: self  Name of Who is Calling: JAILENE JIMENES [[id number]]      What is the request in detail: pt states that he cant afford the umeclidinium-vilanterol (ANORO ELLIPTA) 62.5-25 mcg/actuation DsDv and would like to know if there is something cheaper that can be sent to the pharmacy. Please contact to further discuss and advise.      Can the clinic reply by [facility]: N       What Number [facility] Back [facility]: [phone]

## 2019-09-27 NOTE — TELEPHONE ENCOUNTER
----- Message from Rachael Main MA sent at 9/27/2019  2:30 PM CDT -----  Contact: Self       ----- Message -----  From: Irina Nunez  Sent: 9/27/2019   2:19 PM CDT  To: Milad Castro Staff    Type: Patient Call Back    Who called: Self     What is the request in detail:patient says the medication that was called in for him now is still too expensive . Please call     Can the clinic reply by [facility]? No     Would the patient rather a call back or a response via [facility]?  Call     Best call back number:[phone]    [facility] [place] [address], [address] [phone] (Phone)  [phone] (Fax)

## 2019-09-27 NOTE — TELEPHONE ENCOUNTER
----- Message from Dorys Mosqueda sent at 9/27/2019  1:32 PM CDT -----  Contact: Patient ph [phone]  Type: Patient Call Back    Who called:Patient    What is the request in detail: Patient would like to talk to nurse about getting a shingles injection. Patient think he's due for one. Please call.    Would the patient rather a call back or a response via [facility]? Call back    Best call back number: [phone]

## 2019-09-27 NOTE — TELEPHONE ENCOUNTER
Order placed to discontinue oxygen, pt notified, pt has home oximeter and instructed pt to follow up if his condition changes and he would like to resume his oxygen. Pt is not using his oxygen currently and does not desire to pay for it.

## 2019-10-01 ENCOUNTER — TELEPHONE (OUTPATIENT)
Dept: PULMONOLOGY | Facility: CLINIC | Age: 79
End: 2019-10-01

## 2019-10-01 NOTE — TELEPHONE ENCOUNTER
Pt states he ran out of his anoro and needs coupon as he cannot afford the symbicort. Will try to get pt into patient assistance for symbicort. Pt to  [facility] coupon for stiolto today.

## 2019-10-01 NOTE — TELEPHONE ENCOUNTER
----- Message from Gage Sun sent at 10/1/2019 10:06 AM CDT -----  Contact: Self  Type: Patient Call Back    Who called: self    What is the request in detail: budesonide-formoterol 80-4.5 mcg (SYMBICORT) 80-4.5 mcg/actuation HFAA patient stated this medication is expensive and would like to know if a discount card is available or another cheaper medication  Can the clinic reply by [facility]? no    Would the patient rather a call back or a response via [facility]? call    Best call back number: [phone]

## 2019-10-01 NOTE — TELEPHONE ENCOUNTER
----- Message from Raman Nunez sent at 10/1/2019 10:27 AM CDT -----  Contact: Self  Type: Patient Call Back    Who called:Self    What is the request in detail: He states he needs his medication sent to     [facility] [address] [place] [address] ([place], [place] - [address]  [address]  [place] [address]  Phone: [phone] Fax: [phone]    Can the clinic reply by [facility]?No    Would the patient rather a call back or a response via [facility]? Call    Best call back number:[phone]

## 2019-10-03 NOTE — TELEPHONE ENCOUNTER
Contacted pt to see if he picked up his stiolto. Pt states he picked up the stiolto but also paid for the symbicort. Instructed pt not to take both. Continue symbicort but then start stiolto when he run out of the symbicort

## 2019-10-23 ENCOUNTER — PATIENT OUTREACH (OUTPATIENT)
Dept: ADMINISTRATIVE | Facility: HOSPITAL | Age: 79
End: 2019-10-23

## 2019-10-28 ENCOUNTER — TELEPHONE (OUTPATIENT)
Dept: PULMONOLOGY | Facility: CLINIC | Age: 79
End: 2019-10-28

## 2019-10-29 ENCOUNTER — PATIENT OUTREACH (OUTPATIENT)
Dept: ADMINISTRATIVE | Facility: OTHER | Age: 79
End: 2019-10-29

## 2019-10-29 NOTE — LETTER
AUTHORIZATION FOR RELEASE OF   CONFIDENTIAL INFORMATION    Dear Irina,    We are seeing Percy Ramirez, date of birth 1940, in the clinic at [facility]/ [facility]NAL [facility]/ PEDS. Kasie Edmondson MD is the patient's PCP. Percy Ramirez has an outstanding lab/procedure at the time we reviewed his chart. In order to help keep his health information updated, he has authorized us to request the following medical record(s):        (  )  MAMMOGRAM                                      (  )  COLONOSCOPY      (  )  PAP SMEAR                                          (  )  OUTSIDE LAB RESULTS     (  )  DEXA SCAN                                          ( X )  EYE EXAM            (  )  FOOT EXAM                                          (  )  ENTIRE RECORD     (  )  OUTSIDE IMMUNIZATIONS                 (  )  _______________         Please fax records to [facility], Laura A [place], MD, [phone]      Patient Name: Percy Ramirez  : 1940  Patient Phone #: [phone]

## 2019-10-31 ENCOUNTER — OFFICE VISIT (OUTPATIENT)
Dept: PODIATRY | Facility: CLINIC | Age: 79
End: 2019-10-31
Payer: MEDICARE

## 2019-10-31 VITALS
SYSTOLIC BLOOD PRESSURE: 99 MMHG | BODY MASS INDEX: 27.7 KG/M2 | DIASTOLIC BLOOD PRESSURE: 61 MMHG | WEIGHT: 182.75 LBS | HEART RATE: 64 BPM | HEIGHT: 68 IN

## 2019-10-31 DIAGNOSIS — L84 CORN OR CALLUS: ICD-10-CM

## 2019-10-31 DIAGNOSIS — M20.42 HAMMER TOES OF BOTH FEET: ICD-10-CM

## 2019-10-31 DIAGNOSIS — M20.41 HAMMER TOES OF BOTH FEET: ICD-10-CM

## 2019-10-31 DIAGNOSIS — M20.11 HALLUX ABDUCTO VALGUS, RIGHT: ICD-10-CM

## 2019-10-31 DIAGNOSIS — E11.49 TYPE II DIABETES MELLITUS WITH NEUROLOGICAL MANIFESTATIONS: ICD-10-CM

## 2019-10-31 DIAGNOSIS — B35.1 ONYCHOMYCOSIS DUE TO DERMATOPHYTE: ICD-10-CM

## 2019-10-31 DIAGNOSIS — M20.12 HALLUX ABDUCTO VALGUS, LEFT: ICD-10-CM

## 2019-10-31 PROCEDURE — 11721 DEBRIDE NAIL 6 OR MORE: CPT | Mod: 59,Q9,S$GLB, | Performed by: PODIATRIST

## 2019-10-31 PROCEDURE — 11721 PR DEBRIDEMENT OF NAILS, 6 OR MORE: ICD-10-PCS | Mod: 59,Q9,S$GLB, | Performed by: PODIATRIST

## 2019-10-31 PROCEDURE — 11056 PR TRIM BENIGN HYPERKERATOTIC SKIN LESION,2-4: ICD-10-PCS | Mod: Q9,S$GLB,, | Performed by: PODIATRIST

## 2019-10-31 PROCEDURE — 99999 PR PBB SHADOW E&M-EST. PATIENT-LVL III: ICD-10-PCS | Mod: PBBFAC,,, | Performed by: PODIATRIST

## 2019-10-31 PROCEDURE — 99499 RISK ADDL DX/OHS AUDIT: ICD-10-PCS | Mod: S$GLB,,, | Performed by: PODIATRIST

## 2019-10-31 PROCEDURE — 99499 UNLISTED E&M SERVICE: CPT | Mod: S$GLB,,, | Performed by: PODIATRIST

## 2019-10-31 PROCEDURE — 11056 PARNG/CUTG B9 HYPRKR LES 2-4: CPT | Mod: Q9,S$GLB,, | Performed by: PODIATRIST

## 2019-10-31 PROCEDURE — 99999 PR PBB SHADOW E&M-EST. PATIENT-LVL III: CPT | Mod: PBBFAC,,, | Performed by: PODIATRIST

## 2019-10-31 NOTE — PATIENT INSTRUCTIONS
Recommend lotions: eucerin, eucerin for diabetics, aquaphor, A&D ointment, gold bond for diabetics, sween, Safford's Bees all purpose baby ointment,  urea 40 with aloe (found on amazon.com)    Shoe recommendations: (try 6pm.com, zappos.com , nordstromrack.Mobixell Networks, or shoes.Mobixell Networks for discounted prices) you can visit DSW shoes in [place]  or [employer] [place] in the [facility] (there are also several shoe brand outlets in the [facility])    Asics (GT 2000 or gel foundations), new balance stability type shoes, saucony (stabil c3),  Mahoney (GTS or Beast or transcend), propet (tennis shoe)    Sofft Brand or axxiom (women) Gustabo&Billy (men), clarks, crocs, aerosoles, naturalizers, SAS, ecco, born, ngoc lara, rockports (dress shoes)    Vionic, burkenstocks, fitflops, propet (sandals)  Nike comfort thong sandals, crocs, propet (house shoes)    Nail Home remedy:  Vicks Vapor rub to nails for easier manageability    Diabetes: Inspecting Your Feet  Diabetes increases your chances of developing foot problems. So inspect your feet every day. This helps you find small skin irritations before they become serious infections. If you have trouble seeing the bottoms of your feet, use a mirror or ask a family member or friend to help.     Pressure spots on the bottom of the foot are common areas where problems develop.   How to check your feet  Below are tips to help you look for foot problems. Try to check your feet at the same time each day, such as when you get out of bed in the morning:  · Check the top of each foot. The tops of toes, back of the heel, and outer edge of the foot can get a lot of rubbing from poor-fitting shoes.  · Check the bottom of each foot. Daily wear and tear often leads to problems at pressure spots.  · Check the toes and nails. Fungal infections often occur between toes. Toenail problems can also be a sign of fungal infections or lead to breaks in the skin.  · Check your shoes, too. Loose objects inside a shoe can  injure the foot. Use your hand to feel inside your shoes for things like alfredo, loose stitching, or rough areas that could irritate your skin.  Warning signs  Look for any color changes in the foot. Redness with streaks can signal a severe infection, which needs immediate medical attention. Tell your doctor right away if you have any of these problems:  · Swelling, sometimes with color changes, may be a sign of poor blood flow or infection. Symptoms include tenderness and an increase in the size of your foot.  · Warm or hot areas on your feet may be signs of infection. A foot that is cold may not be getting enough blood.  · Sensations such as burning, tingling, or pins and needles can be signs of a problem. Also check for areas that may be numb.  · Hot spots are caused by friction or pressure. Look for hot spots in areas that get a lot of rubbing. Hot spots can turn into blisters, calluses, or sores.  · Cracks and sores are caused by dry or irritated skin. They are a sign that the skin is breaking down, which can lead to infection.  · Toenail problems to watch for include nails growing into the skin (ingrown toenail) and causing redness or pain. Thick, yellow, or discolored nails can signal a fungal infection.  · Drainage and odor can develop from untreated sores and ulcers. Call your doctor right away if you notice white or yellow drainage, bleeding, or unpleasant odor.   © 2293-7136 [employer]. [address] [place] [address]. All rights reserved. This information is not intended as a substitute for professional medical care. Always follow your healthcare professional's instructions.        Step-by-Step:  Inspecting Your Feet (Diabetes)    Date Last Reviewed: 10/1/2016  © 9187-3779 [employer] [address] [place] [address]. All rights reserved. This information is not intended as a substitute for professional medical care. Always follow your healthcare  professional's instructions.

## 2019-10-31 NOTE — PROGRESS NOTES
Subjective:      Patient ID: Percy Ramirez is a 79 y.o. male.    Chief Complaint: Nail Care (ov 2/11/19 Dr Edmondson PCP) and Numbness (both feet)    Percy is a 79 y.o. male who presents to the clinic for evaluation and treatment of high risk feet. Percy has a past medical history of Allergy, Arthritis, CAD, multiple vessel, Cataract, Depression, Hyperlipidemia, Hypertension, Macular degeneration, S/P CABG x 2, and Type 2 diabetes mellitus with circulatory disorder. The patient's chief complaint is elongated, thickened toenails aggravated by increased weight bearing, shoe gear, pressure.    Patient admits to barefoot walking often in and around home    This patient has documented high risk feet requiring routine maintenance secondary to diabetes mellitis and those secondary complications of diabetes, as mentioned..    PCP: Kasie Edmondson MD    Date Last Seen by PCP:   Chief Complaint   Patient presents with    Nail Care     ov 2/11/19 Dr Edmondson PCP    Numbness     both feet     Current shoe gear:  rx shoes    Hemoglobin A1C   Date Value Ref Range Status   07/19/2019 7.9 (H) 4.0 - 5.6 % Final     Comment:     Dr. Philippe Aquino ( Endocrinology )   04/15/2019 7.2 (A) 4.0 - 5.6 % Corrected     Comment:     Dr. Philippe Aquino ( Endocrinology )   12/17/2018 7.6 (H) 4.0 - 5.6 % Final     Comment:     Dr. Philippe Aquino ( Endocrinology )     Patient Active Problem List   Diagnosis    Major depressive disorder, recurrent episode, mild    Hypertension    Type 2 diabetes, uncontrolled, with retinopathy    Coronary artery disease    S/P CABG x 2    Macular degeneration    Obstructive sleep apnea treated with BiPAP    Anxiety state, unspecified    Insulin long-term use    Primary osteoarthritis of both knees    Chronic low back pain    DJD (degenerative joint disease), lumbar    Type 2 diabetes, uncontrolled, with neuropathy    Bilateral carotid artery disease    Hyperlipidemia LDL goal <100    Type  "2 diabetes, uncontrolled, with peripheral circulatory disorder    COPD mixed type    Atherosclerosis of abdominal aorta    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    Overweight (BMI 25.0-29.9)    Paroxysmal atrial fibrillation    Chronic stable angina    Senile purpura    Osteoarthritis of carpometacarpal (CMC) joint of left thumb    MCI (mild cognitive impairment)    Chronic vertigo    Pulmonary nodule    Dyspnea on exertion    Chronic respiratory failure with hypoxia, on home O2 therapy    Chronic systolic heart failure    Medication intolerance     Current Outpatient Medications on File Prior to Visit   Medication Sig Dispense Refill    albuterol (PROVENTIL/VENTOLIN HFA) 90 mcg/actuation inhaler INHALE 2 PUFFS BY MOUTH EVERY 6 HOURS AS NEEDED FOR WHEEZING AND FOR SHORTNESS OF BREATH 18 each 0    azelastine (ASTELIN) 137 mcg (0.1 %) nasal spray 1 spray (137 mcg total) by Nasal route 2 (two) times daily. 30 mL 2    BD INSULIN PEN NEEDLE UF SHORT 31 gauge x 5/16" Ndle       BD INSULIN SYRINGE ULTRA-FINE 1/2 mL 31 gauge x 15/64" Syrg       carvedilol (COREG) 25 MG tablet Take 1 tablet (25 mg total) by mouth 2 (two) times daily. 180 tablet 0    cinnamon bark 500 mg capsule Take 1,000 mg by mouth once daily.        clopidogrel (PLAVIX) 75 mg tablet Take 75 mg by mouth once daily.      clopidogrel (PLAVIX) 75 mg tablet Take 75 mg by mouth.      donepezil (ARICEPT) 10 MG tablet Take 1 tablet (10 mg total) by mouth every evening. 30 tablet 11    DULoxetine (CYMBALTA) 30 MG capsule Take 1 capsule (30 mg total) by mouth once daily. 90 capsule 1    furosemide (LASIX) 40 MG tablet Take 40 mg by mouth once daily.       furosemide (LASIX) 40 MG tablet Take 40 mg by mouth.      gabapentin (NEURONTIN) 300 MG capsule TAKE 2 CAPSULES BY MOUTH 3 TIMES DAILY 540 capsule 1    glimepiride (AMARYL) 4 MG tablet Take 4 mg by mouth daily with breakfast.       hydrOXYzine HCl " "(ATARAX) 25 MG tablet Take 1 tablet (25 mg total) by mouth 2 (two) times daily as needed for Itching. (caution drowsiness) 30 tablet 0    insulin NPH-insulin regular, 70/30, (NOVOLIN 70/30) 100 unit/mL (70-30) injection Inject into the skin. Inject 5 units at breakfast and inject 5 units at night      insulin syringe-needle U-100 1/2 mL 31 x 5/16" Syrg       losartan (COZAAR) 100 MG tablet Take 1 tablet (100 mg total) by mouth once daily. 90 tablet 0    metformin (GLUCOPHAGE) 500 MG tablet Take 1,000 mg by mouth 2 (two) times daily with meals. 2 Tablets in the morning, 2 Tablets in the evening      nitroGLYCERIN (NITROSTAT) 0.3 MG SL tablet PLACE 1 TABLET UNDER TONGUE AS NEEDED FOR CHEST PAIN EVERY 5 MINUTES, UP TO 3 DOSES IN 15MINUTES 100 tablet 0    olopatadine (PATANOL) 0.1 % ophthalmic solution Place 1 drop into both eyes 2 (two) times daily. 5 mL 0    OM-3/DHA/EPA/FISH OIL/VIT D3 (FISH OIL-VIT D3 ORAL) Take 2,000 Units by mouth once daily.       pravastatin (PRAVACHOL) 20 MG tablet Take 1 tablet (20 mg total) by mouth once daily. 90 tablet 1    pravastatin (PRAVACHOL) 20 MG tablet Take 20 mg by mouth.      rivastigmine tartrate (EXELON) 1.5 MG capsule Take 1 capsule (1.5 mg total) by mouth 2 (two) times daily. 180 capsule 3    spironolactone (ALDACTONE) 25 MG tablet 25 mg once daily.       spironolactone (ALDACTONE) 25 MG tablet Take 25 mg by mouth.      tiotropium-olodaterol (STIOLTO RESPIMAT) 2.5-2.5 mcg/actuation Mist Inhale 2 puffs into the lungs once daily. Controller 4 g 5    VIT C/VIT E AC/LUT/COPPER/ZINC (PRESERVISION LUTEIN ORAL) Take by mouth.      warfarin (COUMADIN) 5 MG tablet Take 2 tabs by mouth on Tuesday,Thursday, Saturday and Sundays then 1.5 on all other days.      warfarin (COUMADIN) 5 MG tablet Take 2 tablets ( 10 mg) on Mon & Fri; Take 1 and 1/2 tablets ( 7.5 mg ) all other days      warfarin (COUMADIN) 7.5 MG tablet Take by mouth.      meclizine (ANTIVERT) 12.5 mg tablet " Take 1 tablet (12.5 mg total) by mouth 3 (three) times daily as needed for Dizziness. 252 tablet 1    [DISCONTINUED] diazepam (VALIUM) 2 MG tablet Take 2 mg by mouth continuous prn.       No current facility-administered medications on file prior to visit.      Review of patient's allergies indicates:   Allergen Reactions    Codeine Nausea Only     weak     Past Surgical History:   Procedure Laterality Date    broken elbow      left    COLONOSCOPY N/A 10/4/2017    Procedure: COLONOSCOPY;  Surgeon: Gabriel Leung MD;  Location: [facility];  Service: Endoscopy;  Laterality: N/A;    EYE SURGERY      cataract    heart bypass      2004    HERNIA REPAIR      right    ROTATOR CUFF REPAIR      right  2004     Family History   Problem Relation Age of Onset    Arthritis Mother     Diabetes Mother     Stroke Mother     Heart attack Father     Cancer Brother     Throat cancer Brother     Diabetes Maternal Aunt     Diabetes Maternal Uncle     Heart disease Maternal Uncle     Diabetes Paternal Aunt     Heart disease Paternal Aunt     Heart disease Paternal Uncle     Heart disease Maternal Grandmother     Cancer Maternal Grandfather      Social History     Socioeconomic History    Marital status:      Spouse name: Not on file    Number of children: 4    Years of education: GED    Highest education level: Not on file   Occupational History    Occupation: retired [employer]    Social Needs    Financial resource strain: Not on file    Food insecurity:     Worry: Not on file     Inability: Not on file    Transportation needs:     Medical: Not on file     Non-medical: Not on file   Tobacco Use    Smoking status: Former Smoker     Packs/day: 3.00     Years: 45.00     Pack years: 135.00     Types: Cigarettes     Last attempt to quit: 4/20/2004     Years since quitting: 15.5    Smokeless tobacco: Never Used   Substance and Sexual Activity    Alcohol use: Yes     Comment: was heavy drinker 35  "years ago, rare use now    Drug use: No    Sexual activity: Yes     Partners: Female   Lifestyle    Physical activity:     Days per week: Not on file     Minutes per session: Not on file    Stress: Not on file   Relationships    Social connections:     Talks on phone: Not on file     Gets together: Not on file     Attends Yazidism service: Not on file     Active member of club or organization: Not on file     Attends meetings of clubs or organizations: Not on file     Relationship status: Not on file   Other Topics Concern    Not on file   Social History Narrative    Not on file     Review of Systems   Constitution: Negative for chills, fever and weakness.   Cardiovascular: Negative for claudication and leg swelling.   Respiratory: Positive for cough. Negative for shortness of breath.    Skin: Positive for dry skin and nail changes. Negative for itching and rash.   Musculoskeletal: Positive for arthritis, back pain and joint pain. Negative for falls, joint swelling and muscle weakness.   Gastrointestinal: Negative for diarrhea, nausea and vomiting.   Neurological: Positive for numbness and paresthesias. Negative for tremors.   Psychiatric/Behavioral: Negative for altered mental status and hallucinations.           Objective:       Vitals:    10/31/19 0936   BP: 99/61   Pulse: 64   Weight: 82.9 kg (182 lb 12.2 oz)   Height: 5' 8" (1.727 m)   PainSc: 10-Worst pain ever       Physical Exam   Constitutional:   General: Pt. is well-developed, well-nourished, appears stated age, in no acute distress, alert and oriented x 3. No evidence of depression, anxiety, or agitation. Calm, cooperative, and communicative. Appropriate interactions and affect.       Cardiovascular:   Pulses:       Dorsalis pedis pulses are 2+ on the right side, and 2+ on the left side.        Posterior tibial pulses are 1+ on the right side, and 1+ on the left side.   There is decreased digital hair.   Musculoskeletal:        Right foot: There " is normal range of motion.        Left foot:  There is normal range of motion.   Muscle strength is 5/5 in all groups bilaterally.    Decreased stride, station of gait.  apropulsive toe off.  Increased angle and base of gait.    Patient has hammertoes of digits 2-5 bilateral partially reducible without symptom today.    Visible and palpable bunion without pain at dorsomedial 1st metatarsal head right and left.  Hallux abducted right and left partially reducible, tracks laterally without being track bound.  No ecchymosis, erythema, edema, or cardinal signs infection or signs of trauma same foot.     Neurological: A sensory deficit is present.   [place] 5.07 monofilamant testing is diminished Farhad feet. Sharp/dull sensation diminished Bilaterally   Skin: Skin is warm, dry and intact. No abrasion, no ecchymosis, no lesion and no rash noted. He is not diaphoretic. No cyanosis or erythema. No pallor. Nails show no clubbing.   Toenails 1-5 bilaterally are elongated by 2-3 mm, thickened by 2-3 mm, discolored/yellowed, dystrophic, brittle with subungual debris.    Focal hyperkeratotic lesion consisting entirely of hyperkeratotic tissue without open skin, drainage, pus, fluctuance, malodor, or signs of infection: medial IPJ of hallux farhad    Interdigital Spaces clean, dry and without evidence of break in skin integrity   Psychiatric: He has a normal mood and affect. His speech is normal.   Nursing note and vitals reviewed.        Assessment:       Encounter Diagnoses   Name Primary?    Type 2 diabetes, uncontrolled, with peripheral circulatory disorder Yes    Type II diabetes mellitus with neurological manifestations     Onychomycosis due to dermatophyte     Corn or callus     Hammer toes of both feet     Hallux abducto valgus, left     Hallux abducto valgus, right          Plan:       Percy was seen today for nail care and numbness.    Diagnoses and all orders for this visit:    Type 2 diabetes, uncontrolled,  with peripheral circulatory disorder    Type II diabetes mellitus with neurological manifestations    Onychomycosis due to dermatophyte    Corn or callus    Hammer toes of both feet    Hallux abducto valgus, left    Hallux abducto valgus, right      I counseled the patient on his conditions, their implications and medical management.      - Shoe inspection. Diabetic Foot Education. Patient reminded of the importance of good nutrition and blood sugar control to help prevent podiatric complications of diabetes. Patient instructed on proper foot hygeine. We discussed wearing proper shoe gear, daily foot inspections, never walking without protective shoe gear, never putting sharp instruments to feet    - With patient's permission, nails were aggressively reduced and debrided x 10 to their soft tissue attachment mechanically and with electric , removing all offending nail and debris. Patient relates relief following the procedure.    - After cleansing the  area w/ alcohol prep pad the above mentioned hyperkeratosis was trimmed utilizing No 15 scapel, to a smooth base with out incident. Patient tolerated this  well and reported comfort to the area of medial hallux IPJ concha    - He will continue to monitor the areas daily, inspect his feet, wear protective shoe gear when ambulatory, moisturizer to maintain skin integrity and follow in this office in approximately 2-3 months, sooner PRN

## 2019-11-05 PROBLEM — Z78.9 MEDICATION INTOLERANCE: Status: RESOLVED | Noted: 2019-09-26 | Resolved: 2019-11-05

## 2019-11-05 LAB
CHOL/HDLC RATIO: 3.1
CHOLESTEROL, TOTAL: 104
CREATININE RANDOM URINE: 70 MG/DL (ref 20–320)
HBA1C MFR BLD: 7.9 % (ref 4–5.6)
HDLC SERPL-MCNC: 34 MG/DL
LDLC SERPL CALC-MCNC: 51 MG/DL
MICROALBUMIN URINE RANDOM: 21.1
MICROALBUMIN/CREATININE RATIO: 301 MCG/MG
NONHDLC SERPL-MCNC: 70 MG/DL
TRIGL SERPL-MCNC: 104 MG/DL

## 2019-11-06 ENCOUNTER — OFFICE VISIT (OUTPATIENT)
Dept: FAMILY MEDICINE | Facility: CLINIC | Age: 79
End: 2019-11-06
Payer: MEDICARE

## 2019-11-06 VITALS
SYSTOLIC BLOOD PRESSURE: 124 MMHG | DIASTOLIC BLOOD PRESSURE: 76 MMHG | WEIGHT: 180.31 LBS | HEART RATE: 66 BPM | HEIGHT: 68 IN | OXYGEN SATURATION: 95 % | BODY MASS INDEX: 27.33 KG/M2 | TEMPERATURE: 98 F

## 2019-11-06 DIAGNOSIS — Z23 NEED FOR SHINGLES VACCINE: ICD-10-CM

## 2019-11-06 DIAGNOSIS — E78.5 HYPERLIPIDEMIA LDL GOAL <100: ICD-10-CM

## 2019-11-06 DIAGNOSIS — I50.22 CHRONIC SYSTOLIC HEART FAILURE: ICD-10-CM

## 2019-11-06 DIAGNOSIS — I77.9 BILATERAL CAROTID ARTERY DISEASE, UNSPECIFIED TYPE: ICD-10-CM

## 2019-11-06 DIAGNOSIS — Z99.81 CHRONIC RESPIRATORY FAILURE WITH HYPOXIA, ON HOME O2 THERAPY: ICD-10-CM

## 2019-11-06 DIAGNOSIS — Z00.00 ROUTINE MEDICAL EXAM: Primary | ICD-10-CM

## 2019-11-06 DIAGNOSIS — I25.10 CORONARY ARTERY DISEASE, ANGINA PRESENCE UNSPECIFIED, UNSPECIFIED VESSEL OR LESION TYPE, UNSPECIFIED WHETHER NATIVE OR TRANSPLANTED HEART: ICD-10-CM

## 2019-11-06 DIAGNOSIS — Z99.89 AMBULATES WITH CANE: ICD-10-CM

## 2019-11-06 DIAGNOSIS — I48.0 PAROXYSMAL ATRIAL FIBRILLATION: ICD-10-CM

## 2019-11-06 DIAGNOSIS — G47.33 OBSTRUCTIVE SLEEP APNEA TREATED WITH BIPAP: ICD-10-CM

## 2019-11-06 DIAGNOSIS — I20.89 CHRONIC STABLE ANGINA: ICD-10-CM

## 2019-11-06 DIAGNOSIS — G31.84 MCI (MILD COGNITIVE IMPAIRMENT): ICD-10-CM

## 2019-11-06 DIAGNOSIS — D69.2 SENILE PURPURA: ICD-10-CM

## 2019-11-06 DIAGNOSIS — I10 ESSENTIAL HYPERTENSION: Chronic | ICD-10-CM

## 2019-11-06 DIAGNOSIS — F41.1 ANXIETY STATE: ICD-10-CM

## 2019-11-06 DIAGNOSIS — J96.11 CHRONIC RESPIRATORY FAILURE WITH HYPOXIA, ON HOME O2 THERAPY: ICD-10-CM

## 2019-11-06 DIAGNOSIS — I70.0 ATHEROSCLEROSIS OF ABDOMINAL AORTA: ICD-10-CM

## 2019-11-06 DIAGNOSIS — Z79.4 INSULIN LONG-TERM USE: ICD-10-CM

## 2019-11-06 DIAGNOSIS — F33.0 MAJOR DEPRESSIVE DISORDER, RECURRENT EPISODE, MILD: ICD-10-CM

## 2019-11-06 DIAGNOSIS — E66.3 OVERWEIGHT (BMI 25.0-29.9): ICD-10-CM

## 2019-11-06 DIAGNOSIS — J44.9 COPD MIXED TYPE: ICD-10-CM

## 2019-11-06 PROCEDURE — 99214 OFFICE O/P EST MOD 30 MIN: CPT | Mod: S$GLB,,, | Performed by: INTERNAL MEDICINE

## 2019-11-06 PROCEDURE — 99214 PR OFFICE/OUTPT VISIT, EST, LEVL IV, 30-39 MIN: ICD-10-PCS | Mod: S$GLB,,, | Performed by: INTERNAL MEDICINE

## 2019-11-06 PROCEDURE — 3078F DIAST BP <80 MM HG: CPT | Mod: CPTII,S$GLB,, | Performed by: INTERNAL MEDICINE

## 2019-11-06 PROCEDURE — 99999 PR PBB SHADOW E&M-EST. PATIENT-LVL III: CPT | Mod: PBBFAC,,, | Performed by: INTERNAL MEDICINE

## 2019-11-06 PROCEDURE — 3078F PR MOST RECENT DIASTOLIC BLOOD PRESSURE < 80 MM HG: ICD-10-PCS | Mod: CPTII,S$GLB,, | Performed by: INTERNAL MEDICINE

## 2019-11-06 PROCEDURE — 99499 RISK ADDL DX/OHS AUDIT: ICD-10-PCS | Mod: S$GLB,,, | Performed by: INTERNAL MEDICINE

## 2019-11-06 PROCEDURE — 99999 PR PBB SHADOW E&M-EST. PATIENT-LVL III: ICD-10-PCS | Mod: PBBFAC,,, | Performed by: INTERNAL MEDICINE

## 2019-11-06 PROCEDURE — 3074F SYST BP LT 130 MM HG: CPT | Mod: CPTII,S$GLB,, | Performed by: INTERNAL MEDICINE

## 2019-11-06 PROCEDURE — 3074F PR MOST RECENT SYSTOLIC BLOOD PRESSURE < 130 MM HG: ICD-10-PCS | Mod: CPTII,S$GLB,, | Performed by: INTERNAL MEDICINE

## 2019-11-06 PROCEDURE — 99499 UNLISTED E&M SERVICE: CPT | Mod: S$GLB,,, | Performed by: INTERNAL MEDICINE

## 2019-11-06 RX ORDER — PRAVASTATIN SODIUM 20 MG/1
20 TABLET ORAL DAILY
Qty: 90 TABLET | Refills: 1 | Status: SHIPPED | OUTPATIENT
Start: 2019-11-06

## 2019-11-06 RX ORDER — LOSARTAN POTASSIUM 100 MG/1
100 TABLET ORAL DAILY
Qty: 90 TABLET | Refills: 1 | Status: SHIPPED | OUTPATIENT
Start: 2019-11-06 | End: 2021-05-13

## 2019-11-06 RX ORDER — LOSARTAN POTASSIUM 100 MG/1
100 TABLET ORAL DAILY
Qty: 90 TABLET | Refills: 0 | Status: CANCELLED | OUTPATIENT
Start: 2019-11-06

## 2019-11-06 NOTE — PROGRESS NOTES
HISTORY OF PRESENT ILLNESS:  Percy Ramirez is a 79 y.o. male who presents to the clinic today for a routine medical physical exam. His last physical exam was approximately 1 years(s) ago.      PAST MEDICAL HISTORY:  Past Medical History:   Diagnosis Date    Allergy     Arthritis     CAD, multiple vessel     DR Melissa    Cataract     Depression     Hyperlipidemia     Hypertension     Macular degeneration     Dr Razo for injection, Jennifer for eye    S/P CABG x 2     Type 2 diabetes mellitus with circulatory disorder     Dr. Aquino       PAST SURGICAL HISTORY:  Past Surgical History:   Procedure Laterality Date    broken elbow      left    COLONOSCOPY N/A 10/4/2017    Procedure: COLONOSCOPY;  Surgeon: Gabriel Leung MD;  Location: [facility] ENDO;  Service: Endoscopy;  Laterality: N/A;    EYE SURGERY      cataract    heart bypass      2004    HERNIA REPAIR      right    ROTATOR CUFF REPAIR      right  2004       SOCIAL HISTORY:  Social History     Socioeconomic History    Marital status:      Spouse name: Not on file    Number of children: 4    Years of education: GED    Highest education level: Not on file   Occupational History    Occupation: retired tug    Social Needs    Financial resource strain: Not on file    Food insecurity:     Worry: Not on file     Inability: Not on file    Transportation needs:     Medical: Not on file     Non-medical: Not on file   Tobacco Use    Smoking status: Former Smoker     Packs/day: 3.00     Years: 45.00     Pack years: 135.00     Types: Cigarettes     Last attempt to quit: 4/20/2004     Years since quitting: 15.5    Smokeless tobacco: Never Used   Substance and Sexual Activity    Alcohol use: Yes     Comment: was heavy drinker 35 years ago, rare use now    Drug use: No    Sexual activity: Yes     Partners: Female   Lifestyle    Physical activity:     Days per week: Not on file     Minutes per session: Not on file    Stress: Not  "on file   Relationships    Social connections:     Talks on phone: Not on file     Gets together: Not on file     Attends Protestant service: Not on file     Active member of club or organization: Not on file     Attends meetings of clubs or organizations: Not on file     Relationship status: Not on file   Other Topics Concern    Not on file   Social History Narrative    Not on file       FAMILY HISTORY:  Family History   Problem Relation Age of Onset    Arthritis Mother     Diabetes Mother     Stroke Mother     Heart attack Father     Cancer Brother     Throat cancer Brother     Diabetes Maternal Aunt     Diabetes Maternal Uncle     Heart disease Maternal Uncle     Diabetes Paternal Aunt     Heart disease Paternal Aunt     Heart disease Paternal Uncle     Heart disease Maternal Grandmother     Cancer Maternal Grandfather        ALLERGIES AND MEDICATIONS: updated and reviewed.  Review of patient's allergies indicates:   Allergen Reactions    Aricept odt [donepezil] Diarrhea and Nausea And Vomiting    Codeine Nausea Only     weak    Ranexa [ranolazine]      Medication List with Changes/Refills   Current Medications    ALBUTEROL (PROVENTIL/VENTOLIN HFA) 90 MCG/ACTUATION INHALER    INHALE 2 PUFFS BY MOUTH EVERY 6 HOURS AS NEEDED FOR WHEEZING AND FOR SHORTNESS OF BREATH    AZELASTINE (ASTELIN) 137 MCG (0.1 %) NASAL SPRAY    1 spray (137 mcg total) by Nasal route 2 (two) times daily.    BD INSULIN PEN NEEDLE UF SHORT 31 GAUGE X 5/16" NDLE        BD INSULIN SYRINGE ULTRA-FINE 1/2 ML 31 GAUGE X 15/64" SYRG        CARVEDILOL (COREG) 25 MG TABLET    Take 1 tablet (25 mg total) by mouth 2 (two) times daily.    CINNAMON BARK 500 MG CAPSULE    Take 1,000 mg by mouth once daily.      CLOPIDOGREL (PLAVIX) 75 MG TABLET    Take 75 mg by mouth once daily.    DONEPEZIL (ARICEPT) 10 MG TABLET    Take 1 tablet (10 mg total) by mouth every evening.    DULOXETINE (CYMBALTA) 30 MG CAPSULE    Take 1 capsule (30 mg total) " "by mouth once daily.    FUROSEMIDE (LASIX) 40 MG TABLET    Take 40 mg by mouth once daily.     GABAPENTIN (NEURONTIN) 300 MG CAPSULE    TAKE 2 CAPSULES BY MOUTH 3 TIMES DAILY    GLIMEPIRIDE (AMARYL) 4 MG TABLET    Take 4 mg by mouth daily with breakfast.     HYDROXYZINE HCL (ATARAX) 25 MG TABLET    Take 1 tablet (25 mg total) by mouth 2 (two) times daily as needed for Itching. (caution drowsiness)    INSULIN NPH-INSULIN REGULAR, 70/30, (NOVOLIN 70/30) 100 UNIT/ML (70-30) INJECTION    Inject into the skin. Inject 5 units at breakfast and inject 5 units at night    INSULIN SYRINGE-NEEDLE U-100 1/2 ML 31 X 5/16" SYRG        MECLIZINE (ANTIVERT) 12.5 MG TABLET    Take 1 tablet (12.5 mg total) by mouth 3 (three) times daily as needed for Dizziness.    METFORMIN (GLUCOPHAGE) 500 MG TABLET    Take 1,000 mg by mouth 2 (two) times daily with meals. 2 Tablets in the morning, 2 Tablets in the evening    NITROGLYCERIN (NITROSTAT) 0.3 MG SL TABLET    PLACE 1 TABLET UNDER TONGUE AS NEEDED FOR CHEST PAIN EVERY 5 MINUTES, UP TO 3 DOSES IN 15MINUTES    OLOPATADINE (PATANOL) 0.1 % OPHTHALMIC SOLUTION    Place 1 drop into both eyes 2 (two) times daily.    OM-3/DHA/EPA/FISH OIL/VIT D3 (FISH OIL-VIT D3 ORAL)    Take 2,000 Units by mouth once daily.     RIVASTIGMINE TARTRATE (EXELON) 1.5 MG CAPSULE    Take 1 capsule (1.5 mg total) by mouth 2 (two) times daily.    SPIRONOLACTONE (ALDACTONE) 25 MG TABLET    25 mg once daily.     TIOTROPIUM-OLODATEROL (STIOLTO RESPIMAT) 2.5-2.5 MCG/ACTUATION MIST    Inhale 2 puffs into the lungs once daily. Controller    VIT C/VIT E AC/LUT/COPPER/ZINC (PRESERVISION LUTEIN ORAL)    Take by mouth.    WARFARIN (COUMADIN) 5 MG TABLET    Take 2 tabs by mouth on Tuesday,Thursday, Saturday and Sundays then 1.5 on all other days.    WARFARIN (COUMADIN) 7.5 MG TABLET    Take by mouth.   Changed and/or Refilled Medications    Modified Medication Previous Medication    LOSARTAN (COZAAR) 100 MG TABLET losartan (COZAAR) " 100 MG tablet       Take 1 tablet (100 mg total) by mouth once daily.    Take 1 tablet (100 mg total) by mouth once daily.    PRAVASTATIN (PRAVACHOL) 20 MG TABLET pravastatin (PRAVACHOL) 20 MG tablet       Take 1 tablet (20 mg total) by mouth once daily.    Take 1 tablet (20 mg total) by mouth once daily.   Discontinued Medications    CLOPIDOGREL (PLAVIX) 75 MG TABLET    Take 75 mg by mouth.    FUROSEMIDE (LASIX) 40 MG TABLET    Take 40 mg by mouth.    PRAVASTATIN (PRAVACHOL) 20 MG TABLET    Take 20 mg by mouth.    SPIRONOLACTONE (ALDACTONE) 25 MG TABLET    Take 25 mg by mouth.    WARFARIN (COUMADIN) 5 MG TABLET    Take 2 tablets ( 10 mg) on Mon & Fri; Take 1 and 1/2 tablets ( 7.5 mg ) all other days         CARE TEAM:  Patient Care Team:  Kasie Edmondson MD as PCP - General (Internal Medicine)  Jac Rodriguez OD as Consulting Provider (Optometry)  Trever Arevalo MD as Consulting Physician (Ophthalmology)  Philippe Aquino MD as Consulting Physician (Endocrinology)  Mac Valderrama MD as Consulting Physician (Cardiology)  Thierno Santiago MD as Consulting Physician (Cardiology)  Marifer Romero DPM as Consulting Physician (Podiatry)  Remedios Madison LPN as Licensed Practical Nurse           SCREENING HISTORY:  Health Maintenance       Date Due Completion Date    Shingles Vaccine (2 of 3) 09/21/2012 7/27/2012    Foot Exam 01/14/2020 1/14/2019 (Done)    Override on 1/14/2019: Done    Override on 6/20/2018: Done    Override on 12/4/2017: Done    Override on 9/13/2017: Done    Override on 7/12/2017: Done    Override on 5/11/2017: Done    Hemoglobin A1c 01/19/2020 7/19/2019    Override on 12/9/2016: Done (A1c - 7.5)    Override on 6/6/2016: Done (7.0)    Override on 1/27/2015: Done (7.9%)    Urine Microalbumin 02/11/2020 2/11/2019    Override on 6/6/2016: Done (43)    Override on 1/27/2015: Done (Elevated)    Lipid Panel 07/19/2020 7/19/2019    Override on 6/6/2016: Done (total 104, HDL 32, LDL 57, TG 73)  "   Override on 1/27/2015: Done (HDL=36, TG=38, LDL=57)    Colonoscopy 10/04/2020 10/4/2017    Override on 8/6/2007: Done    Eye Exam 10/25/2020 10/25/2019    Override on 2/3/2017: Done (Dr. Arevalo)    Override on 1/22/2016: Done (Javiitmeier - mild bilateral diabetic retinopathy; severe bilateral dry macular degeneration)    Override on 5/8/2015: Done    Override on 10/10/2014: Done    TETANUS VACCINE 05/23/2023 5/23/2013            REVIEW OF SYSTEMS:   The patient reports: fair dietary habits.  The patient reports : that they do not exercise.  Review of Systems   Constitutional: Positive for fatigue (- chronic). Negative for chills, fever and unexpected weight change.   HENT: Negative for congestion, ear pain, sore throat and voice change.    Eyes: Positive for visual disturbance. Negative for pain and discharge.   Respiratory: Negative for cough, shortness of breath and wheezing.    Cardiovascular: Negative for chest pain, palpitations and leg swelling.   Gastrointestinal: Negative for abdominal pain, blood in stool, constipation, diarrhea, nausea and vomiting.   Genitourinary: Negative for dysuria and frequency.   Musculoskeletal: Negative for gait problem, joint swelling and neck stiffness.   Skin: Negative for color change and rash.   Neurological: Positive for numbness (- severe neuropathy in his feet). Negative for seizures, weakness and headaches.   Hematological: Negative for adenopathy. Does not bruise/bleed easily.   Psychiatric/Behavioral: Negative for behavioral problems and dysphoric mood. The patient is not nervous/anxious.              PHYSICAL EXAM:  Vitals:    11/06/19 0939   BP: 124/76   Pulse: 66   Temp: 97.9 °F (36.6 °C)     Weight: 81.8 kg (180 lb 5.4 oz)   Height: 5' 8" (172.7 cm)   Body mass index is 27.42 kg/m².    General appearance - alert, well appearing, and in no distress, overweight  Psychiatric - alert, oriented to person, place, and time, normal behavior, speech, dress, motor " activity  Eyes - pupils equal and reactive, extraocular eye movements intact, sclera anicteric, poor vision  Mouth - mucous membranes moist, pharynx normal without lesions  Neck - supple, no significant adenopathy, carotids upstroke normal bilaterally, no bruits, thyroid exam: thyroid is normal in size without nodules or tenderness  Lymphatics - no palpable cervical lymphadenopathy  Chest - clear to auscultation, no wheezes, rales or rhonchi, symmetric air entry  Heart - normal rate and regular rhythm, no gallops noted  Back exam - not examined  Neurological - alert, normal speech, no focal findings or movement disorder noted, cranial nerves II through XII intact  Musculoskeletal - patient noted to have Moderate-Severe osteoarthritic changes to both knee joints. No joint effusions noted., no muscular tenderness noted; he walked with a cane  Extremities - no pedal edema noted  Skin - normal coloration and turgor, no rashes, no suspicious skin lesions noted      Labs:  Lab Results   Component Value Date    HGBA1C 7.9 (H) 07/19/2019    HGBA1C 7.2 (A) 04/15/2019    HGBA1C 7.6 (H) 12/17/2018      Lab Results   Component Value Date    CHOL 98 (L) 10/22/2013     Lab Results   Component Value Date    LDLCALC 54 07/19/2019    LDLCALC 50 04/15/2019    LDLCALC 58 12/17/2018           ASSESSMENT AND PLAN:  1. Routine medical exam  Counseled on age appropriate medical preventative services including age appropriate cancer screenings, age appropriate eye and dental exams, over all nutritional health, need for a consistent exercise regimen, and an over all push towards maintaining a vigorous and active lifestyle.  Counseled on age appropriate vaccines and discussed upcoming health care needs based on age/gender. Discussed good sleep hygiene and stress management.     2. 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 Type 2 diabetes, uncontrolled, with peripheral circulatory disorder/4. Type 2  diabetes, uncontrolled, with neuropathy/5. Type 2 diabetes, uncontrolled, with retinopathy/6. Insulin long-term use  Diabetes is under fair control at this time for age and comorbid conditions. We discussed diabetic diet and regular exercise. We discussed home blood sugar monitoring, if appropriate - the patient should test three times per day with meals and as needed. Continue current medication regimen. and Followed by endocrinology.  Diabetic complications addressed: Neuropathy pain controlled. and Patient denies claudication symptoms at this time.  Patient was counseled on the need for yearly eye exam to screen for/monitor diabetic retinopathy and yearly diabetic foot exam.   - PTH, intact; Future    7. Essential hypertension  Discussed sodium restriction, maintaining ideal body weight and regular exercise program as physiologic means to achieve blood pressure control. The patient will strive towards this. The current medical regimen is effective;  continue present plan and medications. Recommended patient to check home readings to monitor and see me for followup as scheduled or sooner as needed. Patient was educated that both decongestant and anti-inflammatory medication may raise blood pressure.   - losartan (COZAAR) 100 MG tablet; Take 1 tablet (100 mg total) by mouth once daily.  Dispense: 90 tablet; Refill: 1    8. Coronary artery disease, angina presence unspecified, unspecified vessel or lesion type, unspecified whether native or transplanted heart/9. Bilateral carotid artery disease, unspecified type/10. Paroxysmal atrial fibrillation/12. Chronic systolic heart failure/13. Chronic stable angina  Stable. Followed by cardiology.    11. Hyperlipidemia LDL goal <100  We discussed low fat diet and regular exercise.The current medical regimen is effective;  continue present plan and medications.    - pravastatin (PRAVACHOL) 20 MG tablet; Take 1 tablet (20 mg total) by mouth once daily.  Dispense: 90 tablet;  Refill: 1    14. COPD mixed type/15. Chronic respiratory failure with hypoxia, on home O2 therapy  Stable. Inhalers as needed.    16. Obstructive sleep apnea treated with BiPAP  CPAP compliance: no - he is unable to tolerate his CPAP machine.  We discussed the potential ramifications of untreated sleep apnea, which could include daytime sleepiness, hypertension, heart disease including CHF, sudden death while sleeping and/or stroke. The patient was advised to abstain from driving should they feel sleepy or drowsy.  We discussed potential treatment options, which could include weight loss, body positioning, continuous positive airway pressure (CPAP), or referral for surgical consideration.      17. Senile purpura  Stable. Asymptomatic. Observe.     18. Atherosclerosis of abdominal aorta  Patient with Atherosclerosis of the Aorta.  Stable/asymptomatic. Currently stable on lipid lowering medication and b/p monitoring.     19. Major depressive disorder, recurrent episode, mild/20. Anxiety state, unspecified  The current medical regimen is effective;  continue present plan and medications.     21. MCI (mild cognitive impairment)  Stable. Observe.    22. Overweight (BMI 25.0-29.9)  The patient is asked to make an attempt to improve diet and exercise patterns to aid in medical management of this problem.     23. Need for shingles vaccine  Patient was advised to get immunization at the pharmacy.            Follow up in about 6 months (around 5/6/2020), or if symptoms worsen or fail to improve, for follow up chronic medical conditions.. or sooner as needed.

## 2019-11-08 ENCOUNTER — PATIENT OUTREACH (OUTPATIENT)
Dept: ADMINISTRATIVE | Facility: HOSPITAL | Age: 79
End: 2019-11-08

## 2019-11-20 NOTE — TELEPHONE ENCOUNTER
tiotropium-olodaterol (STIOLTO RESPIMAT) 2.5-2.5 mcg/actuation Mist      Pt would also like a coupon for the medication as well.

## 2019-12-10 ENCOUNTER — TELEPHONE (OUTPATIENT)
Dept: FAMILY MEDICINE | Facility: CLINIC | Age: 79
End: 2019-12-10

## 2019-12-10 NOTE — TELEPHONE ENCOUNTER
----- Message from Rachael Vicente sent at 12/10/2019  8:56 AM CST -----  Contact: Self  Type: Patient Call Back    Who called:Percy    What is the request in detail:Patient called to discuss the shingles and tetanus vaccination and if it's needed. Please call to discuss    Can the clinic reply by [facility]?    Would the patient rather a call back or a response via [facility]? Call    Best call back number:[phone]

## 2019-12-10 NOTE — TELEPHONE ENCOUNTER
Spoke with the pt and inform him that we don't have any shingles vaccines available.  Patient verbalized understandings.

## 2019-12-11 DIAGNOSIS — F33.0 MAJOR DEPRESSIVE DISORDER, RECURRENT EPISODE, MILD: ICD-10-CM

## 2019-12-11 RX ORDER — DULOXETIN HYDROCHLORIDE 30 MG/1
CAPSULE, DELAYED RELEASE ORAL
Qty: 90 CAPSULE | Refills: 1 | Status: SHIPPED | OUTPATIENT
Start: 2019-12-11 | End: 2020-02-28

## 2020-01-06 ENCOUNTER — OFFICE VISIT (OUTPATIENT)
Dept: PULMONOLOGY | Facility: CLINIC | Age: 80
End: 2020-01-06
Payer: MEDICARE

## 2020-01-06 VITALS
SYSTOLIC BLOOD PRESSURE: 128 MMHG | DIASTOLIC BLOOD PRESSURE: 81 MMHG | WEIGHT: 177.94 LBS | OXYGEN SATURATION: 95 % | HEIGHT: 68 IN | HEART RATE: 67 BPM | BODY MASS INDEX: 26.97 KG/M2

## 2020-01-06 DIAGNOSIS — J43.9 COPD WITH CHRONIC BRONCHITIS AND EMPHYSEMA: Primary | ICD-10-CM

## 2020-01-06 DIAGNOSIS — I50.42 CHRONIC COMBINED SYSTOLIC AND DIASTOLIC HEART FAILURE: ICD-10-CM

## 2020-01-06 DIAGNOSIS — R06.09 DYSPNEA ON EXERTION: ICD-10-CM

## 2020-01-06 DIAGNOSIS — J44.89 COPD WITH CHRONIC BRONCHITIS AND EMPHYSEMA: Primary | ICD-10-CM

## 2020-01-06 DIAGNOSIS — R91.1 PULMONARY NODULE: ICD-10-CM

## 2020-01-06 DIAGNOSIS — G47.33 OBSTRUCTIVE SLEEP APNEA TREATED WITH BIPAP: ICD-10-CM

## 2020-01-06 PROCEDURE — 99999 PR PBB SHADOW E&M-EST. PATIENT-LVL V: ICD-10-PCS | Mod: PBBFAC,,, | Performed by: NURSE PRACTITIONER

## 2020-01-06 PROCEDURE — 1101F PT FALLS ASSESS-DOCD LE1/YR: CPT | Mod: CPTII,S$GLB,, | Performed by: NURSE PRACTITIONER

## 2020-01-06 PROCEDURE — 3074F PR MOST RECENT SYSTOLIC BLOOD PRESSURE < 130 MM HG: ICD-10-PCS | Mod: CPTII,S$GLB,, | Performed by: NURSE PRACTITIONER

## 2020-01-06 PROCEDURE — 1159F PR MEDICATION LIST DOCUMENTED IN MEDICAL RECORD: ICD-10-PCS | Mod: S$GLB,,, | Performed by: NURSE PRACTITIONER

## 2020-01-06 PROCEDURE — 1159F MED LIST DOCD IN RCRD: CPT | Mod: S$GLB,,, | Performed by: NURSE PRACTITIONER

## 2020-01-06 PROCEDURE — 3079F DIAST BP 80-89 MM HG: CPT | Mod: CPTII,S$GLB,, | Performed by: NURSE PRACTITIONER

## 2020-01-06 PROCEDURE — 99999 PR PBB SHADOW E&M-EST. PATIENT-LVL V: CPT | Mod: PBBFAC,,, | Performed by: NURSE PRACTITIONER

## 2020-01-06 PROCEDURE — 1126F AMNT PAIN NOTED NONE PRSNT: CPT | Mod: S$GLB,,, | Performed by: NURSE PRACTITIONER

## 2020-01-06 PROCEDURE — 1126F PR PAIN SEVERITY QUANTIFIED, NO PAIN PRESENT: ICD-10-PCS | Mod: S$GLB,,, | Performed by: NURSE PRACTITIONER

## 2020-01-06 PROCEDURE — 99214 PR OFFICE/OUTPT VISIT, EST, LEVL IV, 30-39 MIN: ICD-10-PCS | Mod: S$GLB,,, | Performed by: NURSE PRACTITIONER

## 2020-01-06 PROCEDURE — 1101F PR PT FALLS ASSESS DOC 0-1 FALLS W/OUT INJ PAST YR: ICD-10-PCS | Mod: CPTII,S$GLB,, | Performed by: NURSE PRACTITIONER

## 2020-01-06 PROCEDURE — 3074F SYST BP LT 130 MM HG: CPT | Mod: CPTII,S$GLB,, | Performed by: NURSE PRACTITIONER

## 2020-01-06 PROCEDURE — 99214 OFFICE O/P EST MOD 30 MIN: CPT | Mod: S$GLB,,, | Performed by: NURSE PRACTITIONER

## 2020-01-06 PROCEDURE — 3079F PR MOST RECENT DIASTOLIC BLOOD PRESSURE 80-89 MM HG: ICD-10-PCS | Mod: CPTII,S$GLB,, | Performed by: NURSE PRACTITIONER

## 2020-01-06 RX ORDER — ISOSORBIDE MONONITRATE 30 MG/1
30 TABLET, EXTENDED RELEASE ORAL DAILY
COMMUNITY
Start: 2019-11-12

## 2020-01-06 NOTE — PROGRESS NOTES
CHIEF COMPLAINT:    Chief Complaint   Patient presents with    Follow-up       HISTORY OF PRESENT ILLNESS: Percy Ramirez is a 79 y.o. male Former smoker with DM, CAD s/p CABG, PAF, ELINA on CPAP,  COPD is here for follow up,     Reports chronic dyspnea on exertion, can walk less than 1 block, using rescue inhaler 1-2 x a day. Since last visit he has been trial on anoro which he was using daily without significant improvement. Trial on stioto x couple months and reports seems to help. He does not use supplemental oxygen on ambulation as it is too cumbersome and he checks his pulse oximetry and his oxygen never drops below 88%. He has return his oxygen as he gets a monthly charge for this and not using it.      Patient with symptoms of persistent EDS and awakening to go to bathroom despite CPAP usage. ESS 21. Symptoms of snoring and apnea did resolved. Reports his overnight pulse oximetry reading is low without his cpap.     Split night study from  3/21/2017: AHI 31.7, Effective control acieved with cpap 14 cm H2O  CPAP titration study 8/17/2019:  Effective control of sleep disordered breathing was achieved with BPAP at 16/10 cm of water.     Patient on BIPAP 16/10  Compliance Summary  12/14/2019 - 1/12/2020 (30 days) Days with Device Usage 30 days Days without Device Usage 0 days Percent Days with Device Usage 100.0% Cumulative Usage 8 days 1 hrs. 21 mins. 17 secs. Maximum Usage (1 Day) 9 hrs. 19 mins. 7 secs. Average Usage (All Days) 6 hrs. 26 mins. 42 secs. Average Usage (Days Used) 6 hrs. 26 mins. 42 secs. Minimum Usage (1 Day) 1 hrs. 38 mins. 47 secs. Percent of Days with Usage >= 4 Hours 93.3% Percent of Days with Usage < 4 Hours 6.7% Date Range Total Blower Time 8 days 12 hrs. 48 mins. 10 secs. Bi-Level Summary Average Time in Large Leak Per Day 1 hrs. 28 mins. 22 secs.  Average AHI 7.8  EPAP 10.0 cmH2O  IPAP 16.0 cmH2O          PAST MEDICAL HISTORY:    Active Ambulatory Problems     Diagnosis Date Noted     Major depressive disorder, recurrent episode, mild     Hypertension     Type 2 diabetes, uncontrolled, with retinopathy     CAD, multiple vessel     S/P CABG x 2     Macular degeneration     Obstructive sleep apnea on CPAP 07/27/2012    Anxiety state, unspecified 10/24/2013    Insulin long-term use 02/16/2015    Primary osteoarthritis of both knees 02/25/2015    Chronic low back pain 02/25/2015    DJD (degenerative joint disease), lumbar 04/30/2015    Type 2 diabetes, uncontrolled, with neuropathy 07/29/2016    Bilateral carotid artery disease 07/29/2016    Hyperlipidemia LDL goal <100 07/29/2016    Type 2 diabetes, uncontrolled, with peripheral circulatory disorder 07/29/2016    COPD mixed type 07/29/2016    Atherosclerosis of abdominal aorta 07/29/2016    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 07/29/2016    Overweight (BMI 25.0-29.9) 07/29/2016    Paroxysmal atrial fibrillation 03/06/2017    Chronic stable angina 03/06/2017    Senile purpura 03/06/2017    Osteoarthritis of carpometacarpal (CMC) joint of left thumb 11/22/2017    MCI (mild cognitive impairment) 06/18/2018    Chronic vertigo 02/11/2019    Pulmonary nodule 03/20/2019    Dyspnea on exertion 03/20/2019       Past Medical History:   Diagnosis Date    Allergy     Arthritis     CAD, multiple vessel     Cataract     Depression     Hyperlipidemia     Hypertension     Macular degeneration     S/P CABG x 2     Type 2 diabetes mellitus with circulatory disorder                 PAST SURGICAL HISTORY:    Past Surgical History:   Procedure Laterality Date    broken elbow      left    COLONOSCOPY N/A 10/4/2017    Procedure: COLONOSCOPY;  Surgeon: Gabriel Leung MD;  Location: [facility];  Service: Endoscopy;  Laterality: N/A;    EYE SURGERY      cataract    heart bypass      2004    HERNIA REPAIR      right    ROTATOR CUFF REPAIR      right  2004         FAMILY HISTORY:               "  Family History   Problem Relation Age of Onset    Arthritis Mother     Diabetes Mother     Stroke Mother     Heart attack Father     Cancer Brother     Throat cancer Brother     Diabetes Maternal Aunt     Diabetes Maternal Uncle     Heart disease Maternal Uncle     Diabetes Paternal Aunt     Heart disease Paternal Aunt     Heart disease Paternal Uncle     Heart disease Maternal Grandmother     Cancer Maternal Grandfather        SOCIAL HISTORY:          Tobacco:   Social History     Tobacco Use   Smoking Status Former Smoker    Packs/day: 3.00    Years: 45.00    Pack years: 135.00    Types: Cigarettes    Last attempt to quit: 4/20/2004    Years since quitting: 15.7   Smokeless Tobacco Never Used       alcohol use:    Social History     Substance and Sexual Activity   Alcohol Use Yes    Comment: was heavy drinker 35 years ago, rare use now                   ALLERGIES:    Review of patient's allergies indicates:   Allergen Reactions    Aricept odt [donepezil] Diarrhea and Nausea And Vomiting    Codeine Nausea Only     weak    Ranexa [ranolazine]        CURRENT MEDICATIONS:    Current Outpatient Medications   Medication Sig Dispense Refill    albuterol (PROVENTIL/VENTOLIN HFA) 90 mcg/actuation inhaler INHALE 2 PUFFS BY MOUTH EVERY 6 HOURS AS NEEDED FOR WHEEZING AND FOR SHORTNESS OF BREATH 18 each 0    azelastine (ASTELIN) 137 mcg (0.1 %) nasal spray 1 spray (137 mcg total) by Nasal route 2 (two) times daily. 30 mL 2    BD INSULIN PEN NEEDLE UF SHORT 31 gauge x 5/16" Ndle       BD INSULIN SYRINGE ULTRA-FINE 1/2 mL 31 gauge x 15/64" Syrg       carvedilol (COREG) 25 MG tablet Take 1 tablet (25 mg total) by mouth 2 (two) times daily. 180 tablet 0    cinnamon bark 500 mg capsule Take 1,000 mg by mouth once daily.        clopidogrel (PLAVIX) 75 mg tablet Take 75 mg by mouth once daily.      donepezil (ARICEPT) 10 MG tablet Take 1 tablet (10 mg total) by mouth every evening. 30 tablet 11    " "DULoxetine (CYMBALTA) 30 MG capsule TAKE 1 CAPSULE ONCE DAILY 90 capsule 1    furosemide (LASIX) 40 MG tablet Take 40 mg by mouth once daily.       gabapentin (NEURONTIN) 300 MG capsule TAKE 2 CAPSULES BY MOUTH 3 TIMES DAILY 540 capsule 1    glimepiride (AMARYL) 4 MG tablet Take 4 mg by mouth daily with breakfast.       insulin NPH-insulin regular, 70/30, (NOVOLIN 70/30) 100 unit/mL (70-30) injection Inject into the skin. Inject 5 units at breakfast and inject 5 units at night      insulin syringe-needle U-100 1/2 mL 31 x 5/16" Syrg       isosorbide mononitrate (IMDUR) 30 MG 24 hr tablet Take 30 mg by mouth once daily.      losartan (COZAAR) 100 MG tablet Take 1 tablet (100 mg total) by mouth once daily. 90 tablet 1    metformin (GLUCOPHAGE) 500 MG tablet Take 1,000 mg by mouth 2 (two) times daily with meals. 2 Tablets in the morning, 2 Tablets in the evening      nitroGLYCERIN (NITROSTAT) 0.3 MG SL tablet PLACE 1 TABLET UNDER TONGUE AS NEEDED FOR CHEST PAIN EVERY 5 MINUTES, UP TO 3 DOSES IN 15MINUTES 100 tablet 0    OM-3/DHA/EPA/FISH OIL/VIT D3 (FISH OIL-VIT D3 ORAL) Take 2,000 Units by mouth once daily.       pravastatin (PRAVACHOL) 20 MG tablet Take 1 tablet (20 mg total) by mouth once daily. 90 tablet 1    rivastigmine tartrate (EXELON) 1.5 MG capsule Take 1 capsule (1.5 mg total) by mouth 2 (two) times daily. 180 capsule 3    spironolactone (ALDACTONE) 25 MG tablet 25 mg once daily.       tiotropium-olodaterol (STIOLTO RESPIMAT) 2.5-2.5 mcg/actuation Mist Inhale 2 puffs into the lungs once daily. Controller (please assemble and prime for patient) 4 g 5    VIT C/VIT E AC/LUT/COPPER/ZINC (PRESERVISION LUTEIN ORAL) Take by mouth.      warfarin (COUMADIN) 5 MG tablet Take 2 tabs by mouth on Tuesday,Thursday, Saturday and Sundays then 1.5 on all other days.      warfarin (COUMADIN) 7.5 MG tablet Take by mouth.      meclizine (ANTIVERT) 12.5 mg tablet Take 1 tablet (12.5 mg total) by mouth 3 (three) " "times daily as needed for Dizziness. 252 tablet 1     No current facility-administered medications for this visit.                   REVIEW OF SYSTEMS:   Review of Systems   Constitutional: Negative for fever, chills and activity change.   HENT: Positive for postnasal drip and congestion (right nare stopped up all the time).    Respiratory: Positive for apnea (resole with cpap), snoring (resolve with cpap), cough (minimal dry cough), dyspnea on extertion (1 block), use of rescue inhaler (1-2 a day) and somnolence. Negative for chest tightness, wheezing and Paroxysmal Nocturnal Dyspnea.    Cardiovascular: Positive for leg swelling. Negative for chest pain and palpitations.        Follow by Dr. Valderrama, last visit 1 month ago, "everything ok"    Gastrointestinal: Negative for acid reflux.   Psychiatric/Behavioral: Positive for sleep disturbance.        PHYSICAL EXAM:  Vitals:    01/06/20 0928   BP: 128/81   Pulse: 67   SpO2: 95%   Weight: 80.7 kg (177 lb 14.6 oz)   Height: 5' 8" (1.727 m)   PainSc: 0-No pain     Body mass index is 27.05 kg/m².   Physical Exam   Constitutional: He is oriented to person, place, and time. He appears well-developed and well-nourished. No distress.   HENT:   Head: Normocephalic.   Nose: Nose normal.   Mouth/Throat: Oropharynx is clear and moist. Mallampati Score: II.   Neck: Neck supple.   Cardiovascular: Normal rate and regular rhythm.   Pulmonary/Chest: Normal expansion, effort normal and breath sounds normal.   Neurological: He is alert and oriented to person, place, and time. Gait normal.   Skin: He is not diaphoretic.   Psychiatric: He has a normal mood and affect. His behavior is normal.        ambulatory  on RA                                         DATA :  Pulmonary Functions Testing Results:  1/31/13 tlc 93%; dlco 46%  4/8/14 ratio 67%; fvc 76%; fve 73%  6/14/19 ratio 48%, fev 1 54.8 fvc 85.5  Walk test: 95/88/91  ABG: none  CXR:   Lungs clear  CT CHEST:  2/15/18 rll ggo and tib in " lingula.  + emphysema repeat imaging scheduled 3/2020  PPD none        2-d echo 10/27/2019  Mildly decreased left ventricular systolic function.     Calculated left ventricular ejection fraction by 2D tracking is 47%.     Mild pulmonary hypertension.     Right ventricular systolic pressure 43.3 mmHg.     Mildly decreased right ventricular systolic function.     Moderately increased left atrial volume 46.4 ml/m².                                                   bnp 8/5/10 134;1/24/13 164       Split night study from  3/21/2017: AHI 31.7, Effective control acieved with cpap 14 cm H2O  CPAP titration study 8/17/19: Effective control of sleep disordered breathing was achieved with BPAP at 16/10 cm of water.     ASSESSMENT    ICD-10-CM ICD-9-CM    1. COPD with chronic bronchitis and emphysema J44.9 491.20 tiotropium-olodaterol (STIOLTO RESPIMAT) 2.5-2.5 mcg/actuation Mist   2. Pulmonary nodule R91.1 793.11    3. Obstructive sleep apnea treated with BiPAP G47.33 327.23    4. Dyspnea on exertion R06.09 786.09    5. Chronic combined systolic and diastolic heart failure I50.42 428.42        PLAN:    Problem List Items Addressed This Visit        Unprioritized    Pulmonary nodule    Overview     Ct with 0.5 mm rll ggo.  Repeat ct in 1 year.  This has been scheduled for 3/2020         Obstructive sleep apnea treated with BiPAP    Overview     Patient with persistent EDS despite regular cpap usage. CPAP titration study on 8/17/2019 reveal effective treatment with bipap. Switch equipment to bipap.     AHI suboptimal but with large mask leak, pt will work with DME to improve mask fit         Dyspnea on exertion    Overview     multiple etiologies.  Cardiac (CHF, pulmonary htn) +/- copd +/- decondition.  Patient on stiolto.  Cont with albuterol prn.  Patient quit smoking 2004.           COPD mixed type - Primary    Overview     PFTs increasing severity based on increasing symptoms and reduced fev1, pt on stiolto No obvious  evidence at this time for need oxygen.          Relevant Medications    tiotropium-olodaterol (STIOLTO RESPIMAT) 2.5-2.5 mcg/actuation Mist    Chronic combined systolic and diastolic heart failure    Overview     Likely contributing to CORTES         Current Assessment & Plan     F/u per cardiology             Follow up in about 1 month (around 2/6/2020).

## 2020-01-06 NOTE — PATIENT INSTRUCTIONS
Supplies replacement recommendations:     Replace light blue filter monthly, or every 2 weeks if soil   Replace dark blue filter 6 months, wash monthly  Replace water chamber 6 months  Replace mask and tubing every 3-6 months     Wash with warm dishing washing detergent like david and water, rinse, hang to dry

## 2020-01-12 PROBLEM — I50.42 CHRONIC COMBINED SYSTOLIC AND DIASTOLIC HEART FAILURE: Status: ACTIVE | Noted: 2018-11-30

## 2020-01-16 ENCOUNTER — TELEPHONE (OUTPATIENT)
Dept: PULMONOLOGY | Facility: CLINIC | Age: 80
End: 2020-01-16

## 2020-01-16 NOTE — TELEPHONE ENCOUNTER
----- Message from Rachael Main MA sent at 1/16/2020 10:35 AM CST -----  Contact: Self [phone]   Please advise  ----- Message -----  From: Mahsa Tapia  Sent: 1/16/2020  10:32 AM CST  To: Milad Castro Staff    Type: Patient Call Back    Who called:Self    What is the request in detail: pt is calling because he thinks that the tiotropium-olodaterol (STIOLTO RESPIMAT) 2.5-2.5 mcg/actuation Mist is messing with his eye sight. He states that it is making everything look cloudy    Can the clinic reply by [facility]? Call back    Would the patient rather a call back or a response via [facility]? Call back    Best call back number: [phone]

## 2020-01-16 NOTE — TELEPHONE ENCOUNTER
Pt with macular degeneration with worsening vision, minimal improvement breathing longacting inhalers, advised to discontinue unless condition significantly worsens, albuterol prn. Follow up with ophthalmology if vision problems persist

## 2020-01-19 ENCOUNTER — NURSE TRIAGE (OUTPATIENT)
Dept: ADMINISTRATIVE | Facility: CLINIC | Age: 80
End: 2020-01-19

## 2020-01-19 NOTE — TELEPHONE ENCOUNTER
"Pt is requesting Plavix refill. Pt last took Plavix yesterday and has two pills left. Medication is prescribed per Dr. Valderrama at [facility]. Pt advised per protocol to call Dr. Valderrama's office tomorrow and pt verbalizes understanding.       Reason for Disposition   Caller requesting a NON-URGENT new prescription or refill and triager unable to refill per unit policy    Additional Information   Negative: MORE THAN A DOUBLE DOSE of a prescription or over-the-counter (OTC) drug   Negative: [1] DOUBLE DOSE (an extra dose or lesser amount) of over-the-counter (OTC) drug AND [2] any symptoms (e.g., dizziness, nausea, pain, sleepiness)   Negative: [1] DOUBLE DOSE (an extra dose or lesser amount) of prescription drug AND [2] any symptoms (e.g., dizziness, nausea, pain, sleepiness)   Negative: Took another person's prescription drug   Negative: [1] DOUBLE DOSE (an extra dose or lesser amount) of prescription drug AND [2] NO symptoms (Exception: a double dose of antibiotics)   Negative: Diabetes drug error or overdose (e.g., insulin or extra dose)   Negative: [1] Request for URGENT new prescription or refill of "essential" medication (i.e., likelihood of harm to patient if not taken) AND [2] triager unable to fill per unit policy   Negative: [1] Prescription not at pharmacy AND [2] was prescribed today by PCP   Negative: Pharmacy calling with prescription questions and triager unable to answer question   Negative: Caller has urgent medication question about med that PCP prescribed and triager unable to answer question   Negative: Caller has NON-URGENT medication question about med that PCP prescribed and triager unable to answer question    Protocols used: MEDICATION QUESTION CALL-A-AH      "

## 2020-01-20 ENCOUNTER — NURSE TRIAGE (OUTPATIENT)
Dept: ADMINISTRATIVE | Facility: CLINIC | Age: 80
End: 2020-01-20

## 2020-01-20 DIAGNOSIS — I25.10 CORONARY ARTERY DISEASE, ANGINA PRESENCE UNSPECIFIED, UNSPECIFIED VESSEL OR LESION TYPE, UNSPECIFIED WHETHER NATIVE OR TRANSPLANTED HEART: Primary | ICD-10-CM

## 2020-01-20 RX ORDER — CLOPIDOGREL BISULFATE 75 MG/1
75 TABLET ORAL DAILY
Qty: 90 TABLET | Refills: 0 | OUTPATIENT
Start: 2020-01-20

## 2020-01-21 NOTE — TELEPHONE ENCOUNTER
Blood pressure 133/88 earlier. Oxygen was ok this morning. Advised blood pressure was ok.    Reason for Disposition   Health Information question, no triage required and triager able to answer question    Protocols used: INFORMATION ONLY CALL-A-AH

## 2020-01-22 NOTE — TELEPHONE ENCOUNTER
Spoke with patient and he said that his heart doctor has re-ordered and they should be coming in the mail.

## 2020-01-29 ENCOUNTER — TELEPHONE (OUTPATIENT)
Dept: FAMILY MEDICINE | Facility: CLINIC | Age: 80
End: 2020-01-29

## 2020-01-29 DIAGNOSIS — J44.1 COPD EXACERBATION: ICD-10-CM

## 2020-01-29 NOTE — TELEPHONE ENCOUNTER
----- Message from Mahsa Tapia sent at 1/29/2020  2:12 PM CST -----  Contact: Self  [phone]  Type: Patient Call Back    Who called:Self    What is the request in detail: pt is calling in regards to getting an order/referral for diabetic education    Can the clinic reply by [facility]? Call back    Would the patient rather a call back or a response via [facility]? Call back    Best call back number: [phone]

## 2020-01-30 ENCOUNTER — OFFICE VISIT (OUTPATIENT)
Dept: PODIATRY | Facility: CLINIC | Age: 80
End: 2020-01-30
Payer: MEDICARE

## 2020-01-30 ENCOUNTER — TELEPHONE (OUTPATIENT)
Dept: PODIATRY | Facility: CLINIC | Age: 80
End: 2020-01-30

## 2020-01-30 VITALS — WEIGHT: 177.94 LBS | HEIGHT: 68 IN | BODY MASS INDEX: 26.97 KG/M2

## 2020-01-30 DIAGNOSIS — M20.42 HAMMER TOES OF BOTH FEET: ICD-10-CM

## 2020-01-30 DIAGNOSIS — E11.49 TYPE II DIABETES MELLITUS WITH NEUROLOGICAL MANIFESTATIONS: ICD-10-CM

## 2020-01-30 DIAGNOSIS — M20.41 HAMMER TOES OF BOTH FEET: ICD-10-CM

## 2020-01-30 DIAGNOSIS — B35.1 ONYCHOMYCOSIS DUE TO DERMATOPHYTE: ICD-10-CM

## 2020-01-30 DIAGNOSIS — M20.11 HALLUX ABDUCTO VALGUS, RIGHT: ICD-10-CM

## 2020-01-30 DIAGNOSIS — L84 CORN OR CALLUS: ICD-10-CM

## 2020-01-30 DIAGNOSIS — M20.12 HALLUX ABDUCTO VALGUS, LEFT: ICD-10-CM

## 2020-01-30 PROCEDURE — 11056 PR TRIM BENIGN HYPERKERATOTIC SKIN LESION,2-4: ICD-10-PCS | Mod: Q9,S$GLB,, | Performed by: PODIATRIST

## 2020-01-30 PROCEDURE — 99499 UNLISTED E&M SERVICE: CPT | Mod: S$GLB,,, | Performed by: PODIATRIST

## 2020-01-30 PROCEDURE — 99999 PR PBB SHADOW E&M-EST. PATIENT-LVL III: CPT | Mod: PBBFAC,,, | Performed by: PODIATRIST

## 2020-01-30 PROCEDURE — 11721 PR DEBRIDEMENT OF NAILS, 6 OR MORE: ICD-10-PCS | Mod: 59,Q9,S$GLB, | Performed by: PODIATRIST

## 2020-01-30 PROCEDURE — 99499 NO LOS: ICD-10-PCS | Mod: S$GLB,,, | Performed by: PODIATRIST

## 2020-01-30 PROCEDURE — 99999 PR PBB SHADOW E&M-EST. PATIENT-LVL III: ICD-10-PCS | Mod: PBBFAC,,, | Performed by: PODIATRIST

## 2020-01-30 PROCEDURE — 11721 DEBRIDE NAIL 6 OR MORE: CPT | Mod: 59,Q9,S$GLB, | Performed by: PODIATRIST

## 2020-01-30 PROCEDURE — 11056 PARNG/CUTG B9 HYPRKR LES 2-4: CPT | Mod: Q9,S$GLB,, | Performed by: PODIATRIST

## 2020-01-30 RX ORDER — ALBUTEROL SULFATE 90 UG/1
AEROSOL, METERED RESPIRATORY (INHALATION)
Qty: 18 G | Refills: 0 | Status: SHIPPED | OUTPATIENT
Start: 2020-01-30 | End: 2020-03-24 | Stop reason: SDUPTHER

## 2020-01-30 NOTE — PROGRESS NOTES
Subjective:      Patient ID: Percy Ramirez is a 79 y.o. male.    Chief Complaint: Diabetes Mellitus (11/6/19 Dr Edmondson PCP) and Nail Care    Percy is a 79 y.o. male who presents to the clinic for evaluation and treatment of high risk feet. Percy has a past medical history of Allergy, Arthritis, CAD, multiple vessel, Cataract, Depression, Hyperlipidemia, Hypertension, Macular degeneration, S/P CABG x 2, and Type 2 diabetes mellitus with circulatory disorder. The patient's chief complaint is elongated, thickened toenails aggravated by increased weight bearing, shoe gear, pressure.    Patient admits to barefoot walking often in and around home    Patient complains of numbness, tingling, burning to the feet not currently well controlled with gabapentin or Cymbalta.    This patient has documented high risk feet requiring routine maintenance secondary to diabetes mellitis and those secondary complications of diabetes, as mentioned..    PCP: Kasie Edmondson MD    Date Last Seen by PCP:   Chief Complaint   Patient presents with    Diabetes Mellitus     11/6/19 Dr Edmondson PCP    Nail Care     Current shoe gear:  rx shoes    Hemoglobin A1C   Date Value Ref Range Status   11/05/2019 7.9 (A) 4.0 - 5.6 % Final     Comment:     Dr. Philippe Aquino ( Endocrinology )   07/19/2019 7.9 (H) 4.0 - 5.6 % Final     Comment:     Dr. Philippe Aquino ( Endocrinology )   04/15/2019 7.2 (A) 4.0 - 5.6 % Corrected     Comment:     Dr. Philippe Aquino ( Endocrinology )     Patient Active Problem List   Diagnosis    Major depressive disorder, recurrent episode, mild    Hypertension    Type 2 diabetes, uncontrolled, with retinopathy    Coronary artery disease    S/P CABG x 2    Macular degeneration    Obstructive sleep apnea treated with BiPAP    Anxiety state, unspecified    Insulin long-term use    Primary osteoarthritis of both knees    Chronic low back pain    DJD (degenerative joint disease), lumbar    Type 2 diabetes,  "uncontrolled, with neuropathy    Bilateral carotid artery disease    Hyperlipidemia LDL goal <100    Type 2 diabetes, uncontrolled, with peripheral circulatory disorder    COPD mixed type    Atherosclerosis of abdominal aorta    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    Overweight (BMI 25.0-29.9)    Paroxysmal atrial fibrillation    Chronic stable angina    Senile purpura    Osteoarthritis of carpometacarpal (CMC) joint of left thumb    MCI (mild cognitive impairment)    Chronic vertigo    Pulmonary nodule    Dyspnea on exertion    Chronic respiratory failure with hypoxia, on home O2 therapy    Chronic combined systolic and diastolic heart failure    Ambulates with cane     Current Outpatient Medications on File Prior to Visit   Medication Sig Dispense Refill    albuterol (PROVENTIL/VENTOLIN HFA) 90 mcg/actuation inhaler INHALE 2 PUFFS BY MOUTH EVERY 6 HOURS AS NEEDED FOR WHEEZING AND FOR SHORTNESS OF BREATH 18 g 0    azelastine (ASTELIN) 137 mcg (0.1 %) nasal spray 1 spray (137 mcg total) by Nasal route 2 (two) times daily. 30 mL 2    BD INSULIN PEN NEEDLE UF SHORT 31 gauge x 5/16" Ndle       BD INSULIN SYRINGE ULTRA-FINE 1/2 mL 31 gauge x 15/64" Syrg       carvedilol (COREG) 25 MG tablet Take 1 tablet (25 mg total) by mouth 2 (two) times daily. 180 tablet 0    cinnamon bark 500 mg capsule Take 1,000 mg by mouth once daily.        clopidogrel (PLAVIX) 75 mg tablet Take 75 mg by mouth once daily.      donepezil (ARICEPT) 10 MG tablet Take 1 tablet (10 mg total) by mouth every evening. 30 tablet 11    DULoxetine (CYMBALTA) 30 MG capsule TAKE 1 CAPSULE ONCE DAILY 90 capsule 1    furosemide (LASIX) 40 MG tablet Take 40 mg by mouth once daily.       gabapentin (NEURONTIN) 300 MG capsule TAKE 2 CAPSULES BY MOUTH 3 TIMES DAILY 540 capsule 1    glimepiride (AMARYL) 4 MG tablet Take 4 mg by mouth daily with breakfast.       insulin NPH-insulin regular, " "70/30, (NOVOLIN 70/30) 100 unit/mL (70-30) injection Inject into the skin. Inject 5 units at breakfast and inject 5 units at night      insulin syringe-needle U-100 1/2 mL 31 x 5/16" Syrg       isosorbide mononitrate (IMDUR) 30 MG 24 hr tablet Take 30 mg by mouth once daily.      losartan (COZAAR) 100 MG tablet Take 1 tablet (100 mg total) by mouth once daily. 90 tablet 1    metformin (GLUCOPHAGE) 500 MG tablet Take 1,000 mg by mouth 2 (two) times daily with meals. 2 Tablets in the morning, 2 Tablets in the evening      nitroGLYCERIN (NITROSTAT) 0.3 MG SL tablet PLACE 1 TABLET UNDER TONGUE AS NEEDED FOR CHEST PAIN EVERY 5 MINUTES, UP TO 3 DOSES IN 15MINUTES 100 tablet 0    OM-3/DHA/EPA/FISH OIL/VIT D3 (FISH OIL-VIT D3 ORAL) Take 2,000 Units by mouth once daily.       pravastatin (PRAVACHOL) 20 MG tablet Take 1 tablet (20 mg total) by mouth once daily. 90 tablet 1    rivastigmine tartrate (EXELON) 1.5 MG capsule Take 1 capsule (1.5 mg total) by mouth 2 (two) times daily. 180 capsule 3    spironolactone (ALDACTONE) 25 MG tablet 25 mg once daily.       VIT C/VIT E AC/LUT/COPPER/ZINC (PRESERVISION LUTEIN ORAL) Take by mouth.      warfarin (COUMADIN) 5 MG tablet Take 2 tabs by mouth on Tuesday,Thursday, Saturday and Sundays then 1.5 on all other days.      warfarin (COUMADIN) 7.5 MG tablet Take by mouth.      meclizine (ANTIVERT) 12.5 mg tablet Take 1 tablet (12.5 mg total) by mouth 3 (three) times daily as needed for Dizziness. 252 tablet 1    [DISCONTINUED] albuterol (PROVENTIL/VENTOLIN HFA) 90 mcg/actuation inhaler INHALE 2 PUFFS BY MOUTH EVERY 6 HOURS AS NEEDED FOR WHEEZING AND FOR SHORTNESS OF BREATH 18 each 0    [DISCONTINUED] diazepam (VALIUM) 2 MG tablet Take 2 mg by mouth continuous prn.       No current facility-administered medications on file prior to visit.      Review of patient's allergies indicates:   Allergen Reactions    Codeine Nausea Only     weak     Past Surgical History:   Procedure " Laterality Date    broken elbow      left    COLONOSCOPY N/A 10/4/2017    Procedure: COLONOSCOPY;  Surgeon: Gabriel Leung MD;  [facility] END[facility];  Service: Endoscopy;  Laterality: N/A;    EYE SURGERY      cataract    heart bypass      2004    HERNIA REPAIR      right    ROTATOR CUFF REPAIR      right  2004     Family History   Problem Relation Age of Onset    Arthritis Mother     Diabetes Mother     Stroke Mother     Heart attack Father     Cancer Brother     Throat cancer Brother     Diabetes Maternal Aunt     Diabetes Maternal Uncle     Heart disease Maternal Uncle     Diabetes Paternal Aunt     Heart disease Paternal Aunt     Heart disease Paternal Uncle     Heart disease Maternal Grandmother     Cancer Maternal Grandfather      Social History     Socioeconomic History    Marital status:      Spouse name: Not on file    Number of children: 4    Years of education: GED    Highest education level: Not on file   Occupational History    Occupation: retired [employer]    Social Needs    Financial resource strain: Not on file    Food insecurity:     Worry: Not on file     Inability: Not on file    Transportation needs:     Medical: Not on file     Non-medical: Not on file   Tobacco Use    Smoking status: Former Smoker     Packs/day: 3.00     Years: 45.00     Pack years: 135.00     Types: Cigarettes     Last attempt to quit: 4/20/2004     Years since quitting: 15.7    Smokeless tobacco: Never Used   Substance and Sexual Activity    Alcohol use: Yes     Comment: was heavy drinker 35 years ago, rare use now    Drug use: No    Sexual activity: Yes     Partners: Female   Lifestyle    Physical activity:     Days per week: Not on file     Minutes per session: Not on file    Stress: Not on file   Relationships    Social connections:     Talks on phone: Not on file     Gets together: Not on file     Attends Restorationism service: Not on file     Active member of club or  "organization: Not on file     Attends meetings of clubs or organizations: Not on file     Relationship status: Not on file   Other Topics Concern    Not on file   Social History Narrative    Not on file     Review of Systems   Constitution: Negative for chills, fever and weakness.   Cardiovascular: Negative for claudication and leg swelling.   Respiratory: Positive for cough. Negative for shortness of breath.    Skin: Positive for dry skin and nail changes. Negative for itching and rash.   Musculoskeletal: Positive for arthritis, back pain and joint pain. Negative for falls, joint swelling and muscle weakness.   Gastrointestinal: Negative for diarrhea, nausea and vomiting.   Neurological: Positive for numbness and paresthesias. Negative for tremors.   Psychiatric/Behavioral: Negative for altered mental status and hallucinations.           Objective:       Vitals:    01/30/20 0951   Weight: 80.7 kg (177 lb 14.6 oz)   Height: 5' 8" (1.727 m)   PainSc:   4       Physical Exam   Constitutional:   General: Pt. is well-developed, well-nourished, appears stated age, in no acute distress, alert and oriented x 3. No evidence of depression, anxiety, or agitation. Calm, cooperative, and communicative. Appropriate interactions and affect.       Cardiovascular:   Pulses:       Dorsalis pedis pulses are 2+ on the right side, and 2+ on the left side.        Posterior tibial pulses are 1+ on the right side, and 1+ on the left side.   There is decreased digital hair.   Musculoskeletal:        Right foot: There is normal range of motion.        Left foot:  There is normal range of motion.   Muscle strength is 5/5 in all groups bilaterally.    Decreased stride, station of gait.  apropulsive toe off.  Increased angle and base of gait.    Patient has hammertoes of digits 2-5 bilateral partially reducible without symptom today.    Visible and palpable bunion without pain at dorsomedial 1st metatarsal head right and left.  Hallux " abducted right and left partially reducible, tracks laterally without being track bound.  No ecchymosis, erythema, edema, or cardinal signs infection or signs of trauma same foot.     Neurological: A sensory deficit is present.   [place] 5.07 monofilamant testing is diminished Farhad feet. Sharp/dull sensation diminished Bilaterally   Skin: Skin is warm, dry and intact. No abrasion, no ecchymosis, no lesion and no rash noted. He is not diaphoretic. No cyanosis or erythema. No pallor. Nails show no clubbing.   Toenails 1-5 bilaterally are elongated by 2-3 mm, thickened by 2-3 mm, discolored/yellowed, dystrophic, brittle with subungual debris.    Focal hyperkeratotic lesion consisting entirely of hyperkeratotic tissue without open skin, drainage, pus, fluctuance, malodor, or signs of infection: medial IPJ of hallux farhad    Interdigital Spaces clean, dry and without evidence of break in skin integrity   Psychiatric: He has a normal mood and affect. His speech is normal.   Nursing note and vitals reviewed.        Assessment:       Encounter Diagnoses   Name Primary?    Type 2 diabetes, uncontrolled, with peripheral circulatory disorder Yes    Type II diabetes mellitus with neurological manifestations     Onychomycosis due to dermatophyte     Corn or callus     Hammer toes of both feet     Hallux abducto valgus, left     Hallux abducto valgus, right          Plan:       Percy was seen today for diabetes mellitus and nail care.    Diagnoses and all orders for this visit:    Type 2 diabetes, uncontrolled, with peripheral circulatory disorder    Type II diabetes mellitus with neurological manifestations    Onychomycosis due to dermatophyte    Corn or callus    Hammer toes of both feet    Hallux abducto valgus, left    Hallux abducto valgus, right      I counseled the patient on his conditions, their implications and medical management.      - Shoe inspection. Diabetic Foot Education. Patient reminded of the  importance of good nutrition and blood sugar control to help prevent podiatric complications of diabetes. Patient instructed on proper foot hygeine. We discussed wearing proper shoe gear, daily foot inspections, never walking without protective shoe gear, never putting sharp instruments to feet    - With patient's permission, nails were aggressively reduced and debrided x 10 to their soft tissue attachment mechanically and with electric , removing all offending nail and debris. Patient relates relief following the procedure.    - After cleansing the  area w/ alcohol prep pad the above mentioned hyperkeratosis was trimmed utilizing No 15 scapel, to a smooth base with out incident. Patient tolerated this  well and reported comfort to the area of medial hallux IPJ concha    Patient already under the care of a neurologist.  I staff aided in getting patient appointment to have EMG and evaluation and treatment for neurological symptoms to the feet.      - He will continue to monitor the areas daily, inspect his feet, wear protective shoe gear when ambulatory, moisturizer to maintain skin integrity and follow in this office in approximately 2-3 months, sooner PRN

## 2020-01-30 NOTE — TELEPHONE ENCOUNTER
Pt. Wanted to know if you know of another inhaler that can be sent in to the pharmacy.  the Albuterol inhaler is $51.00 dollar.

## 2020-01-30 NOTE — TELEPHONE ENCOUNTER
----- Message from Tasia Melissa sent at 1/30/2020  2:07 PM CST -----  Contact: Self   Type: Patient Call Back    What is the request in detail: Pt states that the medication that was called into pharmacy is too expensive and would like something else cheaper.     Can the clinic reply by [facility]? No    Would the patient rather a call back or a response via [facility]? Call back     Best call back number: [phone]

## 2020-02-03 NOTE — TELEPHONE ENCOUNTER
Spoke with pt states he is having right shoulder pain below the collar bone, offered pt an appointment and states he will just doctor himself up.     Informed pt he was due for second shingles injection, pt states he needs to find a ride first before setting appointment     Pt states he has good Rx and it covers the albuterol

## 2020-02-10 ENCOUNTER — OFFICE VISIT (OUTPATIENT)
Dept: NEUROLOGY | Facility: CLINIC | Age: 80
End: 2020-02-10
Payer: MEDICARE

## 2020-02-10 VITALS
WEIGHT: 180.56 LBS | SYSTOLIC BLOOD PRESSURE: 120 MMHG | HEART RATE: 64 BPM | HEIGHT: 68 IN | BODY MASS INDEX: 27.36 KG/M2 | DIASTOLIC BLOOD PRESSURE: 68 MMHG

## 2020-02-10 DIAGNOSIS — G31.84 MCI (MILD COGNITIVE IMPAIRMENT): Primary | ICD-10-CM

## 2020-02-10 PROCEDURE — 3078F DIAST BP <80 MM HG: CPT | Mod: CPTII,S$GLB,, | Performed by: NEUROLOGICAL SURGERY

## 2020-02-10 PROCEDURE — 99214 OFFICE O/P EST MOD 30 MIN: CPT | Mod: S$GLB,,, | Performed by: NEUROLOGICAL SURGERY

## 2020-02-10 PROCEDURE — 3051F PR MOST RECENT HEMOGLOBIN A1C LEVEL 7.0 - < 8.0%: ICD-10-PCS | Mod: CPTII,S$GLB,, | Performed by: NEUROLOGICAL SURGERY

## 2020-02-10 PROCEDURE — 3074F SYST BP LT 130 MM HG: CPT | Mod: CPTII,S$GLB,, | Performed by: NEUROLOGICAL SURGERY

## 2020-02-10 PROCEDURE — 99214 PR OFFICE/OUTPT VISIT, EST, LEVL IV, 30-39 MIN: ICD-10-PCS | Mod: S$GLB,,, | Performed by: NEUROLOGICAL SURGERY

## 2020-02-10 PROCEDURE — 3074F PR MOST RECENT SYSTOLIC BLOOD PRESSURE < 130 MM HG: ICD-10-PCS | Mod: CPTII,S$GLB,, | Performed by: NEUROLOGICAL SURGERY

## 2020-02-10 PROCEDURE — 99999 PR PBB SHADOW E&M-EST. PATIENT-LVL IV: CPT | Mod: PBBFAC,,, | Performed by: NEUROLOGICAL SURGERY

## 2020-02-10 PROCEDURE — 3078F PR MOST RECENT DIASTOLIC BLOOD PRESSURE < 80 MM HG: ICD-10-PCS | Mod: CPTII,S$GLB,, | Performed by: NEUROLOGICAL SURGERY

## 2020-02-10 PROCEDURE — 1125F AMNT PAIN NOTED PAIN PRSNT: CPT | Mod: S$GLB,,, | Performed by: NEUROLOGICAL SURGERY

## 2020-02-10 PROCEDURE — 1159F MED LIST DOCD IN RCRD: CPT | Mod: S$GLB,,, | Performed by: NEUROLOGICAL SURGERY

## 2020-02-10 PROCEDURE — 1101F PR PT FALLS ASSESS DOC 0-1 FALLS W/OUT INJ PAST YR: ICD-10-PCS | Mod: CPTII,S$GLB,, | Performed by: NEUROLOGICAL SURGERY

## 2020-02-10 PROCEDURE — 1101F PT FALLS ASSESS-DOCD LE1/YR: CPT | Mod: CPTII,S$GLB,, | Performed by: NEUROLOGICAL SURGERY

## 2020-02-10 PROCEDURE — 3051F HG A1C>EQUAL 7.0%<8.0%: CPT | Mod: CPTII,S$GLB,, | Performed by: NEUROLOGICAL SURGERY

## 2020-02-10 PROCEDURE — 1159F PR MEDICATION LIST DOCUMENTED IN MEDICAL RECORD: ICD-10-PCS | Mod: S$GLB,,, | Performed by: NEUROLOGICAL SURGERY

## 2020-02-10 PROCEDURE — 1125F PR PAIN SEVERITY QUANTIFIED, PAIN PRESENT: ICD-10-PCS | Mod: S$GLB,,, | Performed by: NEUROLOGICAL SURGERY

## 2020-02-10 PROCEDURE — 99999 PR PBB SHADOW E&M-EST. PATIENT-LVL IV: ICD-10-PCS | Mod: PBBFAC,,, | Performed by: NEUROLOGICAL SURGERY

## 2020-02-10 RX ORDER — PREGABALIN 150 MG/1
150 CAPSULE ORAL 2 TIMES DAILY
Qty: 60 CAPSULE | Refills: 2 | Status: SHIPPED | OUTPATIENT
Start: 2020-02-10 | End: 2020-02-14

## 2020-02-10 NOTE — PROGRESS NOTES
Chief Complaint   Patient presents with    Follow-up     MCI         Percy Ramirez is a 79 y.o. male with a history of multiple medical diagnoses as listed below that presents for evaluation of memory loss.  He says he has been having short-term memory loss and has been having more difficulty with functioning around the home.  He says that he has been making some errors on occasion with his medications a seems to be more forgetful when it comes to functioning around the home.  He does not feel that there has been any impact on him being able to care for himself .  He says that one of his biggest problems has been remembering names, but other smaller things have been bothersome as well such as forgetting what he felt into a room to get or forgetting a task that he wanted to do around the home.    Interval History  06/18/2018  He has been doing about the same overall since he was last seen in clinic.  He has been bothered by a scratch on the left hand that he cannot get to stop bleeding.  He said that he noticed a scratch on Saturday and this by bending area he has continued to have some oozing of blood there.  He has not had any other problems in the time since he was last seen.    02/18/2019  Patient presents for routine follow-up.  He says that his memory continues to be a source of concern for him.  He has not had any significant change in his memory status. The patient presents alone to clinic today.  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  He says that since he is retired due to his age, ailing health, and failing vision he has been sitting around the home watching television most of the day.  He admits that he does not engage in much physical or mental  activity.  He has been taking Aricept as directed without any complaints of any side effects.    02/10/2020  Despite the concerns expressed at his last visit he has been most bothered by burning, itching, and tingling sensations along the foot, pain is most prominent from the toes to the level of the ankle on both feet.  He says that he cannot say exactly when the symptoms began but they seem to be more intense and more intrusive on his life than it had been in the past.  He has been taking gabapentin as directed but feels that the medication offers little to no benefit in pain control.    PAST MEDICAL HISTORY:  Past Medical History:   Diagnosis Date    Allergy     Arthritis     CAD, multiple vessel     DR Melissa    Cataract     Depression     Hyperlipidemia     Hypertension     Macular degeneration     Dr Razo for injection, Jennifer for eye    S/P CABG x 2     Type 2 diabetes mellitus with circulatory disorder     Dr. Aquino       PAST SURGICAL HISTORY:  Past Surgical History:   Procedure Laterality Date    broken elbow      left    COLONOSCOPY N/A 10/4/2017    Procedure: COLONOSCOPY;  Surgeon: Gabriel Leung MD;  Location: [facility];  Service: Endoscopy;  Laterality: N/A;    EYE SURGERY      cataract    heart bypass      2004    HERNIA REPAIR      right    ROTATOR CUFF REPAIR      right  2004       SOCIAL HISTORY:  Social History     Socioeconomic History    Marital status:      Spouse name: Not on file    Number of children: 4    Years of education: GED    Highest education level: Not on file   Occupational History    Occupation: retired tug    Social Needs    Financial resource strain: Not on file    Food insecurity:     Worry: Not on file     Inability: Not on file    Transportation needs:     Medical: Not on file     Non-medical: Not on file   Tobacco Use    Smoking status: Former Smoker     Packs/day: 3.00     Years: 45.00     Pack years: 135.00     Types:  "Cigarettes     Last attempt to quit: 4/20/2004     Years since quitting: 15.8    Smokeless tobacco: Never Used   Substance and Sexual Activity    Alcohol use: Yes     Comment: was heavy drinker 35 years ago, rare use now    Drug use: No    Sexual activity: Yes     Partners: Female   Lifestyle    Physical activity:     Days per week: Not on file     Minutes per session: Not on file    Stress: Not on file   Relationships    Social connections:     Talks on phone: Not on file     Gets together: Not on file     Attends Alevism service: Not on file     Active member of club or organization: Not on file     Attends meetings of clubs or organizations: Not on file     Relationship status: Not on file   Other Topics Concern    Not on file   Social History Narrative    Not on file       FAMILY HISTORY:  Family History   Problem Relation Age of Onset    Arthritis Mother     Diabetes Mother     Stroke Mother     Heart attack Father     Cancer Brother     Throat cancer Brother     Diabetes Maternal Aunt     Diabetes Maternal Uncle     Heart disease Maternal Uncle     Diabetes Paternal Aunt     Heart disease Paternal Aunt     Heart disease Paternal Uncle     Heart disease Maternal Grandmother     Cancer Maternal Grandfather        ALLERGIES AND MEDICATIONS: updated and reviewed.  Review of patient's allergies indicates:   Allergen Reactions    Codeine Nausea Only     weak    Ranexa [ranolazine]      Current Outpatient Medications   Medication Sig Dispense Refill    albuterol (PROVENTIL/VENTOLIN HFA) 90 mcg/actuation inhaler INHALE 2 PUFFS BY MOUTH EVERY 6 HOURS AS NEEDED FOR WHEEZING AND FOR SHORTNESS OF BREATH 18 g 0    azelastine (ASTELIN) 137 mcg (0.1 %) nasal spray 1 spray (137 mcg total) by Nasal route 2 (two) times daily. 30 mL 2    BD INSULIN PEN NEEDLE UF SHORT 31 gauge x 5/16" Ndle       BD INSULIN SYRINGE ULTRA-FINE 1/2 mL 31 gauge x 15/64" Syrg       carvedilol (COREG) 25 MG tablet Take " "1 tablet (25 mg total) by mouth 2 (two) times daily. 180 tablet 0    cinnamon bark 500 mg capsule Take 1,000 mg by mouth once daily.        clopidogrel (PLAVIX) 75 mg tablet Take 75 mg by mouth once daily.      donepezil (ARICEPT) 10 MG tablet Take 1 tablet (10 mg total) by mouth every evening. 30 tablet 11    DULoxetine (CYMBALTA) 30 MG capsule TAKE 1 CAPSULE ONCE DAILY 90 capsule 1    furosemide (LASIX) 40 MG tablet Take 40 mg by mouth once daily.       gabapentin (NEURONTIN) 300 MG capsule TAKE 2 CAPSULES BY MOUTH 3 TIMES DAILY 540 capsule 1    glimepiride (AMARYL) 4 MG tablet Take 4 mg by mouth daily with breakfast.       insulin NPH-insulin regular, 70/30, (NOVOLIN 70/30) 100 unit/mL (70-30) injection Inject into the skin. Inject 5 units at breakfast and inject 5 units at night      insulin syringe-needle U-100 1/2 mL 31 x 5/16" Syrg       isosorbide mononitrate (IMDUR) 30 MG 24 hr tablet Take 30 mg by mouth once daily.      losartan (COZAAR) 100 MG tablet Take 1 tablet (100 mg total) by mouth once daily. 90 tablet 1    meclizine (ANTIVERT) 12.5 mg tablet Take 1 tablet (12.5 mg total) by mouth 3 (three) times daily as needed for Dizziness. 252 tablet 1    metformin (GLUCOPHAGE) 500 MG tablet Take 1,000 mg by mouth 2 (two) times daily with meals. 2 Tablets in the morning, 2 Tablets in the evening      nitroGLYCERIN (NITROSTAT) 0.3 MG SL tablet PLACE 1 TABLET UNDER TONGUE AS NEEDED FOR CHEST PAIN EVERY 5 MINUTES, UP TO 3 DOSES IN 15MINUTES 100 tablet 0    OM-3/DHA/EPA/FISH OIL/VIT D3 (FISH OIL-VIT D3 ORAL) Take 2,000 Units by mouth once daily.       pravastatin (PRAVACHOL) 20 MG tablet Take 1 tablet (20 mg total) by mouth once daily. 90 tablet 1    pregabalin (LYRICA) 150 MG capsule Take 1 capsule (150 mg total) by mouth 2 (two) times daily. 60 capsule 2    rivastigmine tartrate (EXELON) 1.5 MG capsule Take 1 capsule (1.5 mg total) by mouth 2 (two) times daily. 180 capsule 3    spironolactone " (ALDACTONE) 25 MG tablet 25 mg once daily.       VIT C/VIT E AC/LUT/COPPER/ZINC (PRESERVISION LUTEIN ORAL) Take by mouth.      warfarin (COUMADIN) 5 MG tablet Take 2 tabs by mouth on Tuesday,Thursday, Saturday and Sundays then 1.5 on all other days.      warfarin (COUMADIN) 7.5 MG tablet Take by mouth.       No current facility-administered medications for this visit.        Review of Systems   Constitutional: Negative for activity change, fatigue and unexpected weight change.   HENT: Negative for trouble swallowing and voice change.    Eyes: Negative for photophobia, pain and visual disturbance.   Respiratory: Negative for apnea and shortness of breath.    Cardiovascular: Negative for chest pain and palpitations.   Gastrointestinal: Negative for constipation, nausea and vomiting.   Genitourinary: Negative for difficulty urinating.   Musculoskeletal: Negative for arthralgias, back pain, gait problem, myalgias and neck pain.   Skin: Negative for color change and rash.   Neurological: Negative for dizziness, seizures, syncope, speech difficulty, weakness, light-headedness, numbness and headaches.   Psychiatric/Behavioral: Positive for confusion and decreased concentration. Negative for agitation and behavioral problems.       Neurologic Exam     Mental Status   Oriented to person, place, and time.   Oriented to person.   Oriented to city.   Oriented to year, month, date and day.   Registration of memory: 5/5. Recall of objects at 5 minutes: 3/5.   Attention: normal. Concentration: normal.   Speech: speech is normal   Level of consciousness: alert  Knowledge: good.   Able to name object. Unable to repeat.     Cranial Nerves     CN II   Visual fields full to confrontation.   Right visual field deficit: none  Left visual field deficit: none     CN III, IV, VI   Pupils are equal, round, and reactive to light.  Extraocular motions are normal.   Right pupil: Size: 3 mm. Shape: regular. Accommodation: intact.   Left  pupil: Size: 3 mm. Shape: regular. Accommodation: intact.   CN III: no CN III palsy  CN VI: no CN VI palsy  Nystagmus: none   Diplopia: none  Ophthalmoparesis: none  Upgaze: normal  Downgaze: normal  Conjugate gaze: present    CN V   Facial sensation intact.   Right facial sensation deficit: none  Left facial sensation deficit: none    CN VII   Facial expression full, symmetric.   Right facial weakness: none  Left facial weakness: none    CN VIII   CN VIII normal.     CN IX, X   CN IX normal.   CN X normal.   Palate: symmetric    CN XI   CN XI normal.   Right sternocleidomastoid strength: normal  Left sternocleidomastoid strength: normal  Right trapezius strength: normal  Left trapezius strength: normal    CN XII   CN XII normal.   Tongue deviation: none    Motor Exam   Muscle bulk: normal  Overall muscle tone: normal  Right arm tone: normal  Left arm tone: normal  Right leg tone: normal  Left leg tone: normal    Strength   Strength 5/5 throughout.     Sensory Exam   Right arm light touch: normal  Left arm light touch: normal  Right leg light touch: normal  Left leg light touch: normal  Right arm vibration: normal  Left arm vibration: normal  Right leg vibration: normal  Left leg vibration: normal  Right arm proprioception: normal  Left arm proprioception: normal  Right leg proprioception: normal  Left leg proprioception: normal  Right arm pinprick: normal  Left arm pinprick: normal  Right leg pinprick: normal  Left leg pinprick: normal    Gait, Coordination, and Reflexes     Gait  Gait: normal    Coordination   Romberg: negative  Finger to nose coordination: normal  Heel to shin coordination: normal  Tandem walking coordination: normal    Tremor   Resting tremor: absent    Reflexes   Right brachioradialis: 2+  Left brachioradialis: 2+  Right biceps: 2+  Left biceps: 2+  Right triceps: 2+  Left triceps: 2+  Right patellar: 2+  Left patellar: 2+  Right achilles: 2+  Left achilles: 2+  Right plantar: normal  Left  "plantar: normal      Physical Exam   Constitutional: He is oriented to person, place, and time. He appears well-developed and well-nourished.   HENT:   Head: Normocephalic and atraumatic.   Eyes: Pupils are equal, round, and reactive to light. EOM are normal.   Cardiovascular: Intact distal pulses.   Pulmonary/Chest: Effort normal. No respiratory distress.   Musculoskeletal: Normal range of motion.   Neurological: He is alert and oriented to person, place, and time. He has normal strength. He has a normal Finger-Nose-Finger Test, a normal Heel to Shin Test, a normal Romberg Test and a normal Tandem Gait Test. Gait normal.   Reflex Scores:       Tricep reflexes are 2+ on the right side and 2+ on the left side.       Bicep reflexes are 2+ on the right side and 2+ on the left side.       Brachioradialis reflexes are 2+ on the right side and 2+ on the left side.       Patellar reflexes are 2+ on the right side and 2+ on the left side.       Achilles reflexes are 2+ on the right side and 2+ on the left side.  Skin: Skin is warm and dry.   Psychiatric: He has a normal mood and affect. His speech is normal and behavior is normal.       Vitals:    02/10/20 1112   BP: 120/68   BP Location: Left arm   Patient Position: Sitting   BP Method: Large (Automatic)   Pulse: 64   Weight: 81.9 kg (180 lb 8.9 oz)   Height: 5' 8" (1.727 m)     MOCA 18/25 (visual-spatial portions of the examination were refused, as the patient has poor vision)  MRI brain personally reviewed: Chronic microvascular ischemic changes noted.    Assessment & Plan:    Problem List Items Addressed This Visit     Type 2 diabetes, uncontrolled, with retinopathy    Overview     Followed by endocrinology, Dr. Arevalo         Type 2 diabetes, uncontrolled, with neuropathy    Overview     Plan to switch from gabapentin to Lyrica to afford better pain control. Plan to do EMG/NCS if no improvement at that time.         Relevant Medications    pregabalin (LYRICA) 150 MG " capsule    Other Relevant Orders    EMG W/ ULTRASOUND AND NERVE CONDUCTION TEST 2 Extremities    MCI (mild cognitive impairment) - Primary    Overview     Patient vision, lack of activity, and senior living have all contribute to the patient's decline in his overall mental health.  No evidence of dementia.  Patient stable on current dose of Aricept.             Trial of Lyrica to address pain. If no relief then we will proceed with EMG/nerve conduction studies to explore other possibilities that could be contributing to his symptoms.    Follow-up: Follow up in about 1 month (around 3/10/2020).  More than 50% of this 25 minute encounter was spent in counseling and coordinating care of diabetic peripheral neuropathy.

## 2020-02-11 ENCOUNTER — TELEPHONE (OUTPATIENT)
Dept: NEUROLOGY | Facility: CLINIC | Age: 80
End: 2020-02-11

## 2020-02-11 NOTE — TELEPHONE ENCOUNTER
Told patient the side effects of the Lyrica.          ----- Message from Doug Victoria MD sent at 2/11/2020  1:20 PM CST -----  Contact: Self  Sleepiness and dizziness are the main side effects. Usually if already on gabapentin there will be little to no chance of side effects.  ----- Message -----  From: Mor Li MA  Sent: 2/11/2020  11:38 AM CST  To: Doug Victoria MD        ----- Message -----  From: Lobo Liao  Sent: 2/11/2020  11:34 AM CST  To: Julien Camacho Staff        Name of Who is Calling: JAILENE JIMENES [[id number]]      What is the request in detail: Pt wanted to know the side effects of Lyrica.Please contact to further discuss and advise.        Can the clinic reply by [facility]: N      [facility] [phone]

## 2020-02-11 NOTE — TELEPHONE ENCOUNTER
Message sent to Dr. Victoria.          ----- Message from Lobo Liao sent at 2/11/2020 11:34 AM CST -----  Contact: Self      Name of Who is Calling: JAILENE JIMENES [[id number]]      What is the request in detail: Pt wanted to know the side effects of Lyrica.Please contact to further discuss and advise.        Can the clinic reply by [facility]: N      What [facility] [phone]

## 2020-02-12 ENCOUNTER — TELEPHONE (OUTPATIENT)
Dept: NEUROLOGY | Facility: CLINIC | Age: 80
End: 2020-02-12

## 2020-02-12 LAB
CHOL/HDLC RATIO: 3.1
CHOLESTEROL, TOTAL: 99
CREATININE RANDOM URINE: 17 MG/DL (ref 20–320)
HBA1C MFR BLD: 8.2 % (ref 4–5.6)
HDLC SERPL-MCNC: 32 MG/DL
LDLC SERPL CALC-MCNC: 48 MG/DL
MICROALBUMIN URINE RANDOM: 2
MICROALBUMIN/CREATININE RATIO: 118 MCG/MG
NONHDLC SERPL-MCNC: 67 MG/DL
TRIGL SERPL-MCNC: 104 MG/DL

## 2020-02-12 NOTE — TELEPHONE ENCOUNTER
Will start PA today.      ----- Message from Brandon Julien sent at 2/12/2020  8:54 AM CST -----  Contact: Percy [phone]  Type: Patient Call Back    Who called:Percy     What is the request in detail: The patient is requesting a call back from the staff. He states that the medication Lyrica works, but he can not afford the price, for the name brand or generic version. If possible, he would like a cheaper alternative, that's comparable to the Lyrica.    Can the clinic reply by [facility]?no    Would the patient rather a call back or a response via [facility]? Call back     Best call back number:[phone]

## 2020-02-12 NOTE — TELEPHONE ENCOUNTER
Patient can not afford $45.00 monthly of the pregabalin 150 MG capsule. So he wants to go back to gabapentin 300 MG capsule 4 in the morning and 4 in the evening.

## 2020-02-14 RX ORDER — GABAPENTIN 300 MG/1
1200 CAPSULE ORAL 2 TIMES DAILY
Qty: 540 CAPSULE | Refills: 1 | Status: SHIPPED | OUTPATIENT
Start: 2020-02-14

## 2020-02-17 ENCOUNTER — TELEPHONE (OUTPATIENT)
Dept: FAMILY MEDICINE | Facility: CLINIC | Age: 80
End: 2020-02-17

## 2020-02-17 NOTE — TELEPHONE ENCOUNTER
----- Message from Irina Nunez sent at 2/17/2020 10:51 AM CST -----  Contact: Self   Type: Patient Call Back    Who called: Self     What is the request in detail: patient would like to know what type of Diabetes he has. Please call     Can the clinic reply by [facility]? No     Would the patient rather a call back or a response via [facility]?  Call     Best call back number:[phone]

## 2020-02-18 ENCOUNTER — OFFICE VISIT (OUTPATIENT)
Dept: PULMONOLOGY | Facility: CLINIC | Age: 80
End: 2020-02-18
Payer: MEDICARE

## 2020-02-18 VITALS
WEIGHT: 182.56 LBS | DIASTOLIC BLOOD PRESSURE: 85 MMHG | HEIGHT: 68 IN | BODY MASS INDEX: 27.67 KG/M2 | HEART RATE: 65 BPM | OXYGEN SATURATION: 96 % | SYSTOLIC BLOOD PRESSURE: 147 MMHG

## 2020-02-18 DIAGNOSIS — G47.33 OBSTRUCTIVE SLEEP APNEA TREATED WITH BIPAP: ICD-10-CM

## 2020-02-18 DIAGNOSIS — J44.9 COPD MIXED TYPE: Primary | ICD-10-CM

## 2020-02-18 DIAGNOSIS — J44.89 COPD WITH CHRONIC BRONCHITIS AND EMPHYSEMA: ICD-10-CM

## 2020-02-18 DIAGNOSIS — R91.1 PULMONARY NODULE: ICD-10-CM

## 2020-02-18 DIAGNOSIS — J43.9 COPD WITH CHRONIC BRONCHITIS AND EMPHYSEMA: ICD-10-CM

## 2020-02-18 PROCEDURE — 1101F PT FALLS ASSESS-DOCD LE1/YR: CPT | Mod: CPTII,S$GLB,, | Performed by: NURSE PRACTITIONER

## 2020-02-18 PROCEDURE — 1126F PR PAIN SEVERITY QUANTIFIED, NO PAIN PRESENT: ICD-10-PCS | Mod: S$GLB,,, | Performed by: NURSE PRACTITIONER

## 2020-02-18 PROCEDURE — 3079F DIAST BP 80-89 MM HG: CPT | Mod: CPTII,S$GLB,, | Performed by: NURSE PRACTITIONER

## 2020-02-18 PROCEDURE — 1126F AMNT PAIN NOTED NONE PRSNT: CPT | Mod: S$GLB,,, | Performed by: NURSE PRACTITIONER

## 2020-02-18 PROCEDURE — 99999 PR PBB SHADOW E&M-EST. PATIENT-LVL V: ICD-10-PCS | Mod: PBBFAC,,, | Performed by: NURSE PRACTITIONER

## 2020-02-18 PROCEDURE — 99214 PR OFFICE/OUTPT VISIT, EST, LEVL IV, 30-39 MIN: ICD-10-PCS | Mod: S$GLB,,, | Performed by: NURSE PRACTITIONER

## 2020-02-18 PROCEDURE — 3077F PR MOST RECENT SYSTOLIC BLOOD PRESSURE >= 140 MM HG: ICD-10-PCS | Mod: CPTII,S$GLB,, | Performed by: NURSE PRACTITIONER

## 2020-02-18 PROCEDURE — 1159F MED LIST DOCD IN RCRD: CPT | Mod: S$GLB,,, | Performed by: NURSE PRACTITIONER

## 2020-02-18 PROCEDURE — 99999 PR PBB SHADOW E&M-EST. PATIENT-LVL V: CPT | Mod: PBBFAC,,, | Performed by: NURSE PRACTITIONER

## 2020-02-18 PROCEDURE — 1101F PR PT FALLS ASSESS DOC 0-1 FALLS W/OUT INJ PAST YR: ICD-10-PCS | Mod: CPTII,S$GLB,, | Performed by: NURSE PRACTITIONER

## 2020-02-18 PROCEDURE — 3079F PR MOST RECENT DIASTOLIC BLOOD PRESSURE 80-89 MM HG: ICD-10-PCS | Mod: CPTII,S$GLB,, | Performed by: NURSE PRACTITIONER

## 2020-02-18 PROCEDURE — 3077F SYST BP >= 140 MM HG: CPT | Mod: CPTII,S$GLB,, | Performed by: NURSE PRACTITIONER

## 2020-02-18 PROCEDURE — 99214 OFFICE O/P EST MOD 30 MIN: CPT | Mod: S$GLB,,, | Performed by: NURSE PRACTITIONER

## 2020-02-18 PROCEDURE — 1159F PR MEDICATION LIST DOCUMENTED IN MEDICAL RECORD: ICD-10-PCS | Mod: S$GLB,,, | Performed by: NURSE PRACTITIONER

## 2020-02-18 NOTE — PROGRESS NOTES
CHIEF COMPLAINT:    Chief Complaint   Patient presents with    Follow-up     HISTORY OF PRESENT ILLNESS: Percy Ramirez is a 79 y.o. male Former smoker with DM, CAD s/p CABG, PAF, ELINA on CPAP,  COPD is here for follow up,      Reports chronic dyspnea on exertion, can walk less than 1 block, using rescue inhaler 1-2 x a day. Since last visit he has been trial on anoro which he was using daily without significant improvement. Trial on stioto x couple months and reports seems to help but noticed that his vision was getting worst on it. He does not use supplemental oxygen on ambulation as it is too cumbersome and he checks his pulse oximetry and his oxygen never drops below 88%. He has return his oxygen as he gets a monthly charge for this and not using it.       Patient with symptoms of persistent EDS and awakening to go to bathroom despite CPAP usage. ESS 21. Symptoms of snoring and apnea did resolved. Reports his overnight pulse oximetry reading is low without his cpap.      Split night study from  3/21/2017: AHI 31.7, Effective control acieved with cpap 14 cm H2O  CPAP titration study 2019:  Effective control of sleep disordered breathing was achieved with BPAP at 16/10 cm of water.      Patient on BIPAP 16/10     Set up date 10/23/2019   Overall CPAP use: Daily  Is CPAP helping?  yes    CPAP Device interrogation last 30 days:   Machine type : Dreamstation   Pressure setting and range:  16/10 cm H2O  Average daily usage 30 days:  5  Hours    Days > 4 Hours: 29 /30 days  Predicted AHI: 8.5  Mask fit: 79 %  Periodic breathin%        PAST MEDICAL HISTORY:    Active Ambulatory Problems     Diagnosis Date Noted    Major depressive disorder, recurrent episode, mild     Hypertension     Type 2 diabetes, uncontrolled, with retinopathy     Coronary artery disease     S/P CABG x 2     Macular degeneration     Obstructive sleep apnea treated with BiPAP 2012    Anxiety state, unspecified  10/24/2013    Insulin long-term use 02/16/2015    Primary osteoarthritis of both knees 02/25/2015    Chronic low back pain 02/25/2015    DJD (degenerative joint disease), lumbar 04/30/2015    Type 2 diabetes, uncontrolled, with neuropathy 07/29/2016    Bilateral carotid artery disease 07/29/2016    Hyperlipidemia LDL goal <100 07/29/2016    Type 2 diabetes, uncontrolled, with peripheral circulatory disorder 07/29/2016    COPD with chronic bronchitis and emphysema 07/29/2016    Atherosclerosis of abdominal aorta 07/29/2016    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 07/29/2016    Overweight (BMI 25.0-29.9) 07/29/2016    Paroxysmal atrial fibrillation 03/06/2017    Chronic stable angina 03/06/2017    Senile purpura 03/06/2017    Osteoarthritis of carpometacarpal (CMC) joint of left thumb 11/22/2017    MCI (mild cognitive impairment) 06/18/2018    Chronic vertigo 02/11/2019    Pulmonary nodule 03/20/2019    Dyspnea on exertion 03/20/2019    Chronic respiratory failure with hypoxia, on home O2 therapy 09/26/2019    Chronic combined systolic and diastolic heart failure 11/30/2018    Ambulates with cane 11/06/2019     Resolved Ambulatory Problems     Diagnosis Date Noted    Combined hyperlipidemia associated with type 2 diabetes mellitus     Fatigue 07/27/2012    Depressive disorder, not elsewhere classified 10/24/2013    Arrhythmia 12/30/2013    Dizziness of unknown cause 12/30/2013    Hypotension 06/13/2014    Hyponatremia 06/14/2014    Diabetic retinopathy of both eyes 10/23/2014    Diabetic neuropathy 02/25/2015    Poor motor control of trunk 03/11/2015    IT band syndrome 03/11/2015    Impairment of balance 03/11/2015    Colon cancer screening 10/04/2017    Medication intolerance 09/26/2019     Past Medical History:   Diagnosis Date    Allergy     Arthritis     CAD, multiple vessel     Cataract     Depression     Hyperlipidemia     Type 2  diabetes mellitus with circulatory disorder                 PAST SURGICAL HISTORY:    Past Surgical History:   Procedure Laterality Date    broken elbow      left    COLONOSCOPY N/A 10/4/2017    Procedure: COLONOSCOPY;  Surgeon: Gabriel Leung MD;  [facility];  Service: Endoscopy;  Laterality: N/A;    EYE SURGERY      cataract    heart bypass      2004    HERNIA REPAIR      right    ROTATOR CUFF REPAIR      right  2004         FAMILY HISTORY:                Family History   Problem Relation Age of Onset    Arthritis Mother     Diabetes Mother     Stroke Mother     Heart attack Father     Cancer Brother     Throat cancer Brother     Diabetes Maternal Aunt     Diabetes Maternal Uncle     Heart disease Maternal Uncle     Diabetes Paternal Aunt     Heart disease Paternal Aunt     Heart disease Paternal Uncle     Heart disease Maternal Grandmother     Cancer Maternal Grandfather        SOCIAL HISTORY:          Tobacco:   Social History     Tobacco Use   Smoking Status Former Smoker    Packs/day: 3.00    Years: 45.00    Pack years: 135.00    Types: Cigarettes    Last attempt to quit: 4/20/2004    Years since quitting: 15.8   Smokeless Tobacco Never Used       alcohol use:    Social History     Substance and Sexual Activity   Alcohol Use Yes    Comment: was heavy drinker 35 years ago, rare use now                   ALLERGIES:    Review of patient's allergies indicates:   Allergen Reactions    Aricept odt [donepezil] Diarrhea and Nausea And Vomiting    Codeine Nausea Only     weak    Ranexa [ranolazine]        CURRENT MEDICATIONS:    Current Outpatient Medications   Medication Sig Dispense Refill    albuterol (PROVENTIL/VENTOLIN HFA) 90 mcg/actuation inhaler INHALE 2 PUFFS BY MOUTH EVERY 6 HOURS AS NEEDED FOR WHEEZING AND FOR SHORTNESS OF BREATH 18 g 0    azelastine (ASTELIN) 137 mcg (0.1 %) nasal spray 1 spray (137 mcg total) by Nasal route 2 (two) times daily. 30 mL 2    BD  "INSULIN PEN NEEDLE UF SHORT 31 gauge x 5/16" Ndle       BD INSULIN SYRINGE ULTRA-FINE 1/2 mL 31 gauge x 15/64" Syrg       carvedilol (COREG) 25 MG tablet Take 1 tablet (25 mg total) by mouth 2 (two) times daily. 180 tablet 0    cinnamon bark 500 mg capsule Take 1,000 mg by mouth once daily.        clopidogrel (PLAVIX) 75 mg tablet Take 75 mg by mouth once daily.      donepezil (ARICEPT) 10 MG tablet Take 1 tablet (10 mg total) by mouth every evening. 30 tablet 11    furosemide (LASIX) 40 MG tablet Take 40 mg by mouth once daily.       gabapentin (NEURONTIN) 300 MG capsule Take 4 capsules (1,200 mg total) by mouth 2 (two) times daily. 540 capsule 1    glimepiride (AMARYL) 4 MG tablet Take 4 mg by mouth daily with breakfast.       insulin NPH-insulin regular, 70/30, (NOVOLIN 70/30) 100 unit/mL (70-30) injection Inject into the skin. Inject 5 units at breakfast and inject 5 units at night      insulin syringe-needle U-100 1/2 mL 31 x 5/16" Syrg       isosorbide mononitrate (IMDUR) 30 MG 24 hr tablet Take 30 mg by mouth once daily.      losartan (COZAAR) 100 MG tablet Take 1 tablet (100 mg total) by mouth once daily. 90 tablet 1    metformin (GLUCOPHAGE) 500 MG tablet Take 1,000 mg by mouth 2 (two) times daily with meals. 2 Tablets in the morning, 2 Tablets in the evening      nitroGLYCERIN (NITROSTAT) 0.3 MG SL tablet PLACE 1 TABLET UNDER TONGUE AS NEEDED FOR CHEST PAIN EVERY 5 MINUTES, UP TO 3 DOSES IN 15MINUTES 100 tablet 0    OM-3/DHA/EPA/FISH OIL/VIT D3 (FISH OIL-VIT D3 ORAL) Take 2,000 Units by mouth once daily.       pravastatin (PRAVACHOL) 20 MG tablet Take 1 tablet (20 mg total) by mouth once daily. 90 tablet 1    rivastigmine tartrate (EXELON) 1.5 MG capsule Take 1 capsule (1.5 mg total) by mouth 2 (two) times daily. 180 capsule 3    spironolactone (ALDACTONE) 25 MG tablet 25 mg once daily.       VIT C/VIT E AC/LUT/COPPER/ZINC (PRESERVISION LUTEIN ORAL) Take by mouth.      warfarin " "(COUMADIN) 5 MG tablet Take 2 tabs by mouth on Tuesday,Thursday, Saturday and Sundays then 1.5 on all other days.      warfarin (COUMADIN) 7.5 MG tablet Take by mouth.      atorvastatin (LIPITOR) 40 MG tablet Take 40 mg by mouth.      atorvastatin (LIPITOR) 40 MG tablet Take 40 mg by mouth once daily.      DULoxetine (CYMBALTA) 30 MG capsule TAKE 1 CAPSULE BY MOUTH  ONCE DAILY 90 capsule 1    meclizine (ANTIVERT) 12.5 mg tablet Take 1 tablet (12.5 mg total) by mouth 3 (three) times daily as needed for Dizziness. 252 tablet 1    nitroGLYCERIN (NITROSTAT) 0.4 MG SL tablet Place 1 tab under the tongue every 5 minutes for chest pain. Max 3 tabs in 15 minutes. Call 911 for unrelieved chest pain.      nitroGLYCERIN (NITROSTAT) 0.4 MG SL tablet       pregabalin (LYRICA) 150 MG capsule Take 150 mg by mouth.       No current facility-administered medications for this visit.                   REVIEW OF SYSTEMS:   Review of Systems   Constitutional: Negative for fever, chills, weight loss, weight gain, activity change, appetite change, fatigue and night sweats.   HENT: Negative for congestion.    Eyes: Negative.    Respiratory: Positive for dyspnea on extertion (active ) and use of rescue inhaler (2-3 x a day ). Negative for snoring, cough, sputum production, chest tightness, wheezing, orthopnea and previous hospitialization due to pulmonary problems.    Cardiovascular: Negative for chest pain and palpitations.   Genitourinary: Negative.         Bathroom   Endocrine: endocrine negative   Musculoskeletal: Negative for gait problem.   Skin: Negative.    Gastrointestinal: Negative for acid reflux.   Neurological: Negative.    Psychiatric/Behavioral: Positive for sleep disturbance (bathroom once).        PHYSICAL EXAM:  Vitals:    02/18/20 1118   BP: (!) 147/85   Pulse: 65   SpO2: 96%   Weight: 82.8 kg (182 lb 8.7 oz)   Height: 5' 8" (1.727 m)   PainSc: 0-No pain     Body mass index is 27.76 kg/m².   Physical Exam "   Constitutional: He is oriented to person, place, and time. He appears well-developed.   HENT:   Head: Normocephalic.   Nose: Nose normal.   Mouth/Throat: Oropharynx is clear and moist.   Neck: Neck supple.   Cardiovascular: Normal rate, regular rhythm and normal heart sounds.   Pulmonary/Chest: Normal expansion, effort normal and breath sounds normal.   Musculoskeletal: He exhibits no edema.   Neurological: He is alert and oriented to person, place, and time. Gait normal.   Skin: Skin is warm.   Psychiatric: He has a normal mood and affect. His behavior is normal. Judgment and thought content normal.                                            DATA :    PFT 6/14/2019: ratio 48, fev1 54.8 fvc 85.5  Walk 9/27/2019: 94/89/89    CT chest 2/15/2019: 0.5 cm ggo nodule right lung +emphysematous changes    Split night study from  3/21/2017: AHI 31.7, Effective control acieved with cpap 14 cm H2O  CPAP titration study 8/17/2019:  Effective control of sleep disordered breathing was achieved with BPAP at 16/10 cm of water.   Pt refuse oxygen    Lab Results   Component Value Date    TSH 1.486 06/04/2018      Lab Results   Component Value Date    WBC 8.14 11/15/2017    HGB 12.5 (L) 11/15/2017    HCT 36.1 (L) 11/15/2017    MCV 86 11/15/2017     11/15/2017     BMP  Lab Results   Component Value Date     01/23/2018    K 4.8 01/23/2018     01/23/2018    CO2 24 01/23/2018    BUN 14 11/15/2017    CREATININE 1.3 (H) 01/23/2018    CALCIUM 8.5 (L) 01/23/2018    ANIONGAP 8 11/15/2017    ESTGFRAFRICA >60 11/15/2017    EGFRNONAA 54.0 (L) 01/23/2018     Lab Results   Component Value Date    LABA1C 6.8 10/09/2017    HGBA1C 8.2 (H) 02/12/2020        ASSESSMENT    ICD-10-CM ICD-9-CM    1. COPD mixed type J44.9 496 Spirometry without bronchodilator   2. Pulmonary nodule R91.1 793.11    3. Obstructive sleep apnea treated with BiPAP G47.33 327.23    4. COPD with chronic bronchitis and emphysema J44.9 491.20         PLAN:    Problem List Items Addressed This Visit        Unprioritized    Pulmonary nodule    Overview     Ct with 0.5 mm rll ggo.  Repeat ct in 1 year.  This has been scheduled for 3/2020         Obstructive sleep apnea treated with BiPAP    Overview     Patient with persistent EDS despite regular cpap usage. CPAP titration study on 8/17/2019 reveal effective treatment with bipap. Switch equipment to bipap.     AHI suboptimal but with large mask leak, pt will work with DME to improve mask fit         COPD with chronic bronchitis and emphysema - Primary    Overview     PFTs increasing severity based on increasing symptoms and reduced fev1, pt refuse stiolto, anoro due to SE and states he cannot afford it either. Continue albuterol prn. Patient also refuse oxygen. No obvious evidence at this time for need oxygen.                  Patient will Follow up in about 6 months (around 8/18/2020), or with spirometry prior to visit, bring bipap machine . .

## 2020-02-20 ENCOUNTER — PATIENT OUTREACH (OUTPATIENT)
Dept: ADMINISTRATIVE | Facility: HOSPITAL | Age: 80
End: 2020-02-20

## 2020-02-20 RX ORDER — PREGABALIN 150 MG/1
150 CAPSULE ORAL
COMMUNITY
End: 2021-05-13

## 2020-02-20 RX ORDER — NITROGLYCERIN 0.4 MG/1
TABLET SUBLINGUAL
COMMUNITY
Start: 2020-02-04 | End: 2020-11-06

## 2020-02-20 RX ORDER — ATORVASTATIN CALCIUM 40 MG/1
40 TABLET, FILM COATED ORAL DAILY
COMMUNITY
Start: 2020-02-13 | End: 2020-11-06 | Stop reason: SDUPTHER

## 2020-02-20 RX ORDER — NITROGLYCERIN 0.4 MG/1
TABLET SUBLINGUAL
COMMUNITY
Start: 2020-02-07 | End: 2020-11-06

## 2020-02-20 RX ORDER — ATORVASTATIN CALCIUM 40 MG/1
40 TABLET, FILM COATED ORAL
COMMUNITY
Start: 2020-02-13 | End: 2021-02-12

## 2020-02-27 DIAGNOSIS — F33.0 MAJOR DEPRESSIVE DISORDER, RECURRENT EPISODE, MILD: ICD-10-CM

## 2020-02-28 RX ORDER — DULOXETIN HYDROCHLORIDE 30 MG/1
CAPSULE, DELAYED RELEASE ORAL
Qty: 90 CAPSULE | Refills: 1 | Status: SHIPPED | OUTPATIENT
Start: 2020-02-28 | End: 2020-09-07

## 2020-03-18 ENCOUNTER — TELEPHONE (OUTPATIENT)
Dept: NEUROLOGY | Facility: CLINIC | Age: 80
End: 2020-03-18

## 2020-03-18 NOTE — TELEPHONE ENCOUNTER
Told patient that once he finished the Lyrica he may start his gabapentin.        ----- Message from Raman Nunez sent at 3/18/2020  1:32 PM CDT -----  Contact: Patient  Type: Patient Call Back    Who called: Patient    What is the request in detail: Patient is requesting a call back to discuss gabapentin (NEURONTIN) 300 MG      Can the clinic reply by [facility]? No    Would the patient rather a call back or a response via [facility]? Call    Best call back number: [phone]    Additional Information:n/a

## 2020-03-24 ENCOUNTER — TELEPHONE (OUTPATIENT)
Dept: FAMILY MEDICINE | Facility: CLINIC | Age: 80
End: 2020-03-24

## 2020-03-24 DIAGNOSIS — J44.1 COPD EXACERBATION: ICD-10-CM

## 2020-03-24 RX ORDER — ALBUTEROL SULFATE 90 UG/1
AEROSOL, METERED RESPIRATORY (INHALATION)
Qty: 18 G | Refills: 0 | Status: SHIPPED | OUTPATIENT
Start: 2020-03-24 | End: 2020-12-09 | Stop reason: SDUPTHER

## 2020-03-24 NOTE — TELEPHONE ENCOUNTER
----- Message from Cresencio Contreras sent at 3/24/2020  2:06 PM CDT -----  Contact: [facility]   Name of Who is Calling:     What is the request in detail: is requesting a call regards to medication with no directions albuterol (PROVENTIL/VENTOLIN HFA) 90 mcg/actuation inhaler Please contact to further discuss and advise      Can the clinic reply by [facility]: No     What Number to Call Back if not in [facility]:  [phone] (Phone)

## 2020-03-24 NOTE — TELEPHONE ENCOUNTER
Spoke with Cyrus with [facility] pharmacy directions given on inhaler.

## 2020-03-24 NOTE — TELEPHONE ENCOUNTER
----- Message from Rachael Vicente sent at 3/24/2020 12:46 PM CDT -----  Contact: Self  Type: RX Refill Request    Who Called: Percy    Have you contacted your pharmacy:No    Refill or New Rx:refill    RX Name and Strength:albuterol (PROVENTIL/VENTOLIN HFA) 90 mcg/actuation inhaler 18 g       Preferred Pharmacy with phone number:[facility] [place] [facility], [address] [phone] (Phone)  [phone] (Fax)    Local or Mail Order:Local    Ordering Provider:Delvis    Would the patient rather a call back or a response via [facility]? Call    Best Call Back Number:[phone]    Additional Information: Med refill

## 2020-05-01 ENCOUNTER — TELEPHONE (OUTPATIENT)
Dept: PULMONOLOGY | Facility: CLINIC | Age: 80
End: 2020-05-01

## 2020-05-01 DIAGNOSIS — R91.1 LUNG NODULE: ICD-10-CM

## 2020-05-01 NOTE — TELEPHONE ENCOUNTER
----- Message from Rachael Main MA sent at 5/1/2020 10:05 AM CDT -----  Contact: [place] /  [phone]  Please advise  Thank you  ----- Message -----  From: Eileen Jessica  Sent: 5/1/2020   9:56 AM CDT  To: Bettie Templeton V Staff    Type: Patient Call Back    Who called:  Registration     What is the request in detail:  Orders are needed for patients CT Scan for Monday.  Thank you    Would the patient rather a call back or a response via [facility]?   Call back    Best call back number:  [phone]

## 2020-05-04 ENCOUNTER — HOSPITAL ENCOUNTER (OUTPATIENT)
Dept: RADIOLOGY | Facility: HOSPITAL | Age: 80
Discharge: HOME | End: 2020-05-04
Attending: INTERNAL MEDICINE
Payer: MEDICARE

## 2020-05-04 DIAGNOSIS — R91.1 LUNG NODULE: ICD-10-CM

## 2020-05-04 PROCEDURE — 71250 CT CHEST WITHOUT CONTRAST: ICD-10-PCS | Mod: 26,,, | Performed by: RADIOLOGY

## 2020-05-04 PROCEDURE — 71250 CT THORAX DX C-: CPT | Mod: 26,,, | Performed by: RADIOLOGY

## 2020-05-04 PROCEDURE — 71250 CT THORAX DX C-: CPT | Mod: TC

## 2020-05-05 LAB
CHOL/HDLC RATIO: 2.9
CHOLESTEROL, TOTAL: 97
CREATININE RANDOM URINE: 45 MG/DL (ref 20–320)
HBA1C MFR BLD: 7.9 %
HDLC SERPL-MCNC: 33 MG/DL
LDLC SERPL CALC-MCNC: 48 MG/DL
MICROALBUMIN URINE RANDOM: 2.2
MICROALBUMIN/CREATININE RATIO: 49 MCG/MG
NONHDLC SERPL-MCNC: 64 MG/DL
TRIGL SERPL-MCNC: 77 MG/DL

## 2020-05-06 ENCOUNTER — TELEPHONE (OUTPATIENT)
Dept: PULMONOLOGY | Facility: CLINIC | Age: 80
End: 2020-05-06

## 2020-05-06 NOTE — TELEPHONE ENCOUNTER
----- Message from Rachael Main MA sent at 5/4/2020  1:31 PM CDT -----  Contact: self  [phone]  Please advise  Thank you   ----- Message -----  From: Shannan Mae  Sent: 5/4/2020  12:13 PM CDT  To: Bettie Templeton V Staff    .Type: Patient Call Back    Who called: self    What is the request in detail: Pt stated that he had an CT done today and would like to know when he's suppose to fu     Can the clinic reply by [facility]? Call back     Would the patient rather a call back or a response via [facility]?  Call back     Best call back number: [phone]

## 2020-05-07 ENCOUNTER — TELEPHONE (OUTPATIENT)
Dept: PULMONOLOGY | Facility: CLINIC | Age: 80
End: 2020-05-07

## 2020-05-07 ENCOUNTER — PATIENT MESSAGE (OUTPATIENT)
Dept: PULMONOLOGY | Facility: CLINIC | Age: 80
End: 2020-05-07

## 2020-05-07 NOTE — TELEPHONE ENCOUNTER
----- Message from Saray Deal sent at 5/7/2020 11:48 AM CDT -----  Contact: self  Pt states he cannot get into his My Chart even after calling the help desk Pt is requesting an Audio Visit instead if possible please Pt ask for a call to confirm appointment change      Contact info  [phone]

## 2020-05-07 NOTE — TELEPHONE ENCOUNTER
Contacted patient. Patient reports having a difficult time with his password on [facility] and the virtual visit, even after contacting the technical support. Changed patient appointment to audio, patient confirmed.

## 2020-05-12 ENCOUNTER — TELEPHONE (OUTPATIENT)
Dept: PULMONOLOGY | Facility: CLINIC | Age: 80
End: 2020-05-12

## 2020-05-12 ENCOUNTER — OFFICE VISIT (OUTPATIENT)
Dept: PULMONOLOGY | Facility: CLINIC | Age: 80
End: 2020-05-12
Payer: MEDICARE

## 2020-05-12 DIAGNOSIS — J84.9 ILD (INTERSTITIAL LUNG DISEASE): ICD-10-CM

## 2020-05-12 DIAGNOSIS — J43.9 COPD WITH CHRONIC BRONCHITIS AND EMPHYSEMA: ICD-10-CM

## 2020-05-12 DIAGNOSIS — R06.09 DYSPNEA ON EXERTION: ICD-10-CM

## 2020-05-12 DIAGNOSIS — J44.89 COPD WITH CHRONIC BRONCHITIS AND EMPHYSEMA: ICD-10-CM

## 2020-05-12 DIAGNOSIS — G47.33 OBSTRUCTIVE SLEEP APNEA TREATED WITH BIPAP: ICD-10-CM

## 2020-05-12 PROCEDURE — 99443 PR PHYSICIAN TELEPHONE EVALUATION 21-30 MIN: ICD-10-PCS | Mod: 95,,, | Performed by: INTERNAL MEDICINE

## 2020-05-12 PROCEDURE — 99443 PR PHYSICIAN TELEPHONE EVALUATION 21-30 MIN: CPT | Mod: 95,,, | Performed by: INTERNAL MEDICINE

## 2020-05-12 NOTE — ASSESSMENT & PLAN NOTE
Suspect combination of fibrosis + obstructive physiology.  Not candidate for open lung biopsy.  Will monitor with serial pft.  Will repeat spirometry.  Will try lama + laba + ics and reassess.

## 2020-05-12 NOTE — TELEPHONE ENCOUNTER
Patient would like to try the free 30 day supply. He will come into the clinic on Friday 05/15/2020 to  coupon.

## 2020-05-12 NOTE — PROGRESS NOTES
Established Patient - Audio Only Telehealth Visit     The patient location is: home  The chief complaint leading to consultation is: dyspnea  Visit type: Virtual visit with audio only (telephone)  Total time spent with patient: 35 minutes       The reason for the audio only service rather than synchronous audio and video virtual visit was related to technical difficulties or patient preference/necessity.     Each patient to whom I provide medical services by telemedicine is:  (1) informed of the relationship between the physician and patient and the respective role of any other health care provider with respect to management of the patient; and (2) notified that they may decline to receive medical services by telemedicine and may withdraw from such care at any time. Patient verbally consented to receive this service via voice-only telephone call.    Percy Ramirez  was seen as a follow up.        HISTORY OF PRESENT ILLNESS: Percy Ramirez is a 79 y.o. male with pmh of tobacco abuse, cad, tyler, htn, dyslipidemia, dm2 is here for pulmonary evaluation.  Our first encounter was 1/24/13.  At that time, patient with chronic dyspnea.  However, patient stated symptoms have gradually worsen since 2012.  +pelletier x 1/2.  No fever/chills.  No wheezing.  No coughing.  No orthopnea.  No pnd.  Patient with h/o 3 ppd x 40 years.  Quit smoking 2004.      In the past, patient was prescribed spiriva.  However, patient did not filled due to cost.  Currently on albuterol.  Patient underwent LDCT screening 2/11/19 by Dr. Edmondson.  CT demonstrated several ggo.      Our last encounter was 2/2019.  Patient denied hemoptysis.  Still with significant dyspnea.  Patient have not fill spiriva due to cost.  Have been using albuterol 3 times daily.  No fever/chil.  No chest .      PAST MEDICAL HISTORY:    Active Ambulatory Problems     Diagnosis Date Noted    Major depressive disorder, recurrent episode, mild     Hypertension     Type 2 diabetes,  uncontrolled, with retinopathy     Coronary artery disease     S/P CABG x 2     Macular degeneration     Obstructive sleep apnea treated with BiPAP 07/27/2012    Anxiety state, unspecified 10/24/2013    Insulin long-term use 02/16/2015    Primary osteoarthritis of both knees 02/25/2015    Chronic low back pain 02/25/2015    DJD (degenerative joint disease), lumbar 04/30/2015    Type 2 diabetes, uncontrolled, with neuropathy 07/29/2016    Bilateral carotid artery disease 07/29/2016    Hyperlipidemia LDL goal <100 07/29/2016    Type 2 diabetes, uncontrolled, with peripheral circulatory disorder 07/29/2016    COPD with chronic bronchitis and emphysema 07/29/2016    Atherosclerosis of abdominal aorta 07/29/2016    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 07/29/2016    Overweight (BMI 25.0-29.9) 07/29/2016    Paroxysmal atrial fibrillation 03/06/2017    Chronic stable angina 03/06/2017    Senile purpura 03/06/2017    Osteoarthritis of carpometacarpal (CMC) joint of left thumb 11/22/2017    MCI (mild cognitive impairment) 06/18/2018    Chronic vertigo 02/11/2019    Pulmonary nodule 03/20/2019    Dyspnea on exertion 03/20/2019    Chronic respiratory failure with hypoxia, on home O2 therapy 09/26/2019    Chronic combined systolic and diastolic heart failure 11/30/2018    Ambulates with cane 11/06/2019    ILD (interstitial lung disease) 05/12/2020     Resolved Ambulatory Problems     Diagnosis Date Noted    Combined hyperlipidemia associated with type 2 diabetes mellitus     Fatigue 07/27/2012    Depressive disorder, not elsewhere classified 10/24/2013    Arrhythmia 12/30/2013    Dizziness of unknown cause 12/30/2013    Hypotension 06/13/2014    Hyponatremia 06/14/2014    Diabetic retinopathy of both eyes 10/23/2014    Diabetic neuropathy 02/25/2015    Poor motor control of trunk 03/11/2015    IT band syndrome 03/11/2015    Impairment of balance  2015    Colon cancer screening 10/04/2017    Medication intolerance 2019     Past Medical History:   Diagnosis Date    Allergy     Arthritis     CAD, multiple vessel     Cataract     Depression     Hyperlipidemia     Type 2 diabetes mellitus with circulatory disorder                 PAST SURGICAL HISTORY:    Past Surgical History:   Procedure Laterality Date    broken elbow      left    COLONOSCOPY N/A 10/4/2017    Procedure: COLONOSCOPY;  Surgeon: Gabriel Leung MD;  Location: [facility];  Service: Endoscopy;  Laterality: N/A;    EYE SURGERY      cataract    heart bypass      2004    HERNIA REPAIR      right    ROTATOR CUFF REPAIR      right  2004         FAMILY HISTORY:                Family History   Problem Relation Age of Onset    Arthritis Mother     Diabetes Mother     Stroke Mother     Heart attack Father     Cancer Brother     Throat cancer Brother     Diabetes Maternal Aunt     Diabetes Maternal Uncle     Heart disease Maternal Uncle     Diabetes Paternal Aunt     Heart disease Paternal Aunt     Heart disease Paternal Uncle     Heart disease Maternal Grandmother     Cancer Maternal Grandfather        SOCIAL HISTORY:          Tobacco:   Social History     Tobacco Use   Smoking Status Former Smoker    Packs/day: 3.00    Years: 45.00    Pack years: 135.00    Types: Cigarettes    Last attempt to quit: 2004    Years since quittin.0   Smokeless Tobacco Never Used       alcohol use:    Social History     Substance and Sexual Activity   Alcohol Use Yes    Comment: was heavy drinker 35 years ago, rare use now                 Occupation:  Retired     ALLERGIES:    Review of patient's allergies indicates:   Allergen Reactions    Aricept odt [donepezil] Diarrhea and Nausea And Vomiting    Codeine Nausea Only     weak    Ranexa [ranolazine]        CURRENT MEDICATIONS:    Current Outpatient Medications   Medication Sig Dispense Refill     "albuterol (PROVENTIL/VENTOLIN HFA) 90 mcg/actuation inhaler Rescue 18 g 0    atorvastatin (LIPITOR) 40 MG tablet Take 40 mg by mouth.      atorvastatin (LIPITOR) 40 MG tablet Take 40 mg by mouth once daily.      azelastine (ASTELIN) 137 mcg (0.1 %) nasal spray 1 spray (137 mcg total) by Nasal route 2 (two) times daily. 30 mL 2    BD INSULIN PEN NEEDLE UF SHORT 31 gauge x 5/16" Ndle       BD INSULIN SYRINGE ULTRA-FINE 1/2 mL 31 gauge x 15/64" Syrg       carvedilol (COREG) 25 MG tablet Take 1 tablet (25 mg total) by mouth 2 (two) times daily. 180 tablet 0    cinnamon bark 500 mg capsule Take 1,000 mg by mouth once daily.        clopidogrel (PLAVIX) 75 mg tablet Take 75 mg by mouth once daily.      donepezil (ARICEPT) 10 MG tablet Take 1 tablet (10 mg total) by mouth every evening. 30 tablet 11    DULoxetine (CYMBALTA) 30 MG capsule TAKE 1 CAPSULE BY MOUTH  ONCE DAILY 90 capsule 1    fluticasone-umeclidin-vilanter (TRELEGY ELLIPTA) 100-62.5-25 mcg DsDv Inhale 1 puff into the lungs once daily. 60 each 3    furosemide (LASIX) 40 MG tablet Take 40 mg by mouth once daily.       gabapentin (NEURONTIN) 300 MG capsule Take 4 capsules (1,200 mg total) by mouth 2 (two) times daily. 540 capsule 1    glimepiride (AMARYL) 4 MG tablet Take 4 mg by mouth daily with breakfast.       insulin NPH-insulin regular, 70/30, (NOVOLIN 70/30) 100 unit/mL (70-30) injection Inject into the skin. Inject 5 units at breakfast and inject 5 units at night      insulin syringe-needle U-100 1/2 mL 31 x 5/16" Syrg       isosorbide mononitrate (IMDUR) 30 MG 24 hr tablet Take 30 mg by mouth once daily.      losartan (COZAAR) 100 MG tablet Take 1 tablet (100 mg total) by mouth once daily. 90 tablet 1    meclizine (ANTIVERT) 12.5 mg tablet Take 1 tablet (12.5 mg total) by mouth 3 (three) times daily as needed for Dizziness. 252 tablet 1    metformin (GLUCOPHAGE) 500 MG tablet Take 1,000 mg by mouth 2 (two) times daily with meals. 2 " Tablets in the morning, 2 Tablets in the evening      nitroGLYCERIN (NITROSTAT) 0.3 MG SL tablet PLACE 1 TABLET UNDER TONGUE AS NEEDED FOR CHEST PAIN EVERY 5 MINUTES, UP TO 3 DOSES IN 15MINUTES 100 tablet 0    nitroGLYCERIN (NITROSTAT) 0.4 MG SL tablet Place 1 tab under the tongue every 5 minutes for chest pain. Max 3 tabs in 15 minutes. Call 911 for unrelieved chest pain.      nitroGLYCERIN (NITROSTAT) 0.4 MG SL tablet       OM-3/DHA/EPA/FISH OIL/VIT D3 (FISH OIL-VIT D3 ORAL) Take 2,000 Units by mouth once daily.       pravastatin (PRAVACHOL) 20 MG tablet Take 1 tablet (20 mg total) by mouth once daily. 90 tablet 1    pregabalin (LYRICA) 150 MG capsule Take 150 mg by mouth.      rivastigmine tartrate (EXELON) 1.5 MG capsule Take 1 capsule (1.5 mg total) by mouth 2 (two) times daily. 180 capsule 3    spironolactone (ALDACTONE) 25 MG tablet 25 mg once daily.       VIT C/VIT E AC/LUT/COPPER/ZINC (PRESERVISION LUTEIN ORAL) Take by mouth.      warfarin (COUMADIN) 5 MG tablet Take 2 tabs by mouth on Tuesday,Thursday, Saturday and Sundays then 1.5 on all other days.      warfarin (COUMADIN) 7.5 MG tablet Take by mouth.       No current facility-administered medications for this visit.                   REVIEW OF SYSTEMS:     Pulmonary related symptoms as per HPI.  Gen:  no weight loss, no fever, no night sweat  HEENT:  + visual disturbance from macular degenration, no sore throat, + hearing acuity  CV:  No chest pain, no orthopnea, no PND  GI:  no melena, no hematochezia, no diarhea, no constipation.  :  no dysuria, no hematuria, no hesistancy, no dribbling  Neuro:  no syncope, no vertigo, no tinitus  Psych:  No homocide or suicide ideation; no depression.  Endocrine:  No heat or cold intolerance.  Sleep:  No snoring; no witnessed apnea.  Otherwise, a balance of systems reviewed is negative.                 PHYSICAL EXAM:  There were no vitals filed for this visit.  There is no height or weight on file to  calculate BMI.     n/a    LABS  Pulmonary Functions Testing Results:  1/31/13 tlc 93%; dlco 46%  4/8/14 ratio 67%; fvc 76%; fve 73%  6/14/19 ratio of 48%; fvc 85%; fev1 55%  ABG: none  CXR:   Lungs clear  CT CHEST:  2/15/19 rll ggo and tib in lingula.    5/4/19 (personally reviewed) increased reticulation at bases; similar to prior ct 2/15/19; +emphysematous changes.      PPD none          Split night study from  3/21/2017: AHI 31.7, Effective control acieved with cpap 14 cm H2O  CPAP titration study 8/17/2019:  Effective control of sleep disordered breathing was achieved with BPAP at 16/10 cm of water.     2-d echo 12/30/11  1.  The right atrium and right ventricle are of normal dimension.            2.  Tricuspid regurgitation is mild.                                         3.  PA pressure 28-30 mmHg.                                                  4.  Enlargement of the left atrium.                                          5.  Normal mitral valve structure and valve excursion.  No mitral            stenosis.                                                                     No MR detected from this study.                                             6.  Normal left ventricular dimensions and systolic function, ejection       fraction 50%-55%.                                                            7.  Grade I left ventricular diastolic dysfunction.                          8.  Mild aortic sclerosis with good valve excursion.  No aortic stenosis.    Minimal aortic regurgitation.                                                9.  No pericardial effusion seen.      bnp 8/5/10 134;1/24/13 164    6 min walk 350 meters; no significant desaturation    ASSESSMENT  Problem List Items Addressed This Visit     COPD with chronic bronchitis and emphysema    Overview     fev1 55% 6/19.  PFTs increasing severity based on increasing symptoms and reduced fev1, pt refuse stiolto, anoro due to SE and states he cannot afford  it either. Continue albuterol prn. Patient also refuse oxygen. No obvious evidence at this time for need oxygen.  Quit smoking since 1960s.           Current Assessment & Plan     D/w patient at length.  Agree to a trial of trelegy.           Relevant Medications    fluticasone-umeclidin-vilanter (TRELEGY ELLIPTA) 100-62.5-25 mcg DsDv    Dyspnea on exertion    Overview     multiple etiologies.  Cardiac (CHF, pulmonary htn) +/- copd +/- decondition.  Patient on stiolto.  Cont with albuterol prn.  Patient quit smoking 2004.           Current Assessment & Plan     Stable clinically.           ILD (interstitial lung disease)    Overview     Emphysematous changes on ct along with basilar reticulation.  pft with obstructive physiology.           Current Assessment & Plan     Suspect combination of fibrosis + obstructive physiology.  Not candidate for open lung biopsy.  Will monitor with serial pft.  Will repeat spirometry.  Will try lama + laba + ics and reassess.           Relevant Orders    Complete PFT with bronchodilator    Stress test, pulmonary    COVID-19 Routine Screening    Obstructive sleep apnea treated with BiPAP    Overview     ahi of 31.7.  Tolerating bipap with good compliance              Goals of care - patient does not wish for intubation or cpr in the case of cardiopulmonary arrest.  Living [facility] in [facility].  Daughter is poa.      Patient will No follow-ups on file.       This service was not originating from a related E/M service provided within the previous 7 days nor will  to an E/M service or procedure within the next 24 hours or my soonest available appointment.  Prevailing standard of care was able to be met in this audio-only visit.

## 2020-05-12 NOTE — TELEPHONE ENCOUNTER
----- Message from Jareth Alexandre MD sent at 5/12/2020  2:51 PM CDT -----  See if patient is willing to try coupon for 30 days supply.  [email or web address]  ----- Message -----  From: Krissy Jimenez LPN  Sent: 5/12/2020   2:10 PM CDT  To: Jareth Alexandre MD        ----- Message -----  From: Jennifer smileysd  Sent: 5/12/2020   1:33 PM CDT  To: Bettie Templeton V Staff    Type: Patient Call Back    Who called: pt     What is the request in detail: pt states ---fluticasone-umeclidin-vilanter (TRELEGY ELLIPTA) 100-62.5-25 mcg DsDv-- is too expensive and asking for an alternate.    Can the clinic reply by [facility]?  NO     Would the patient rather a call back or a response via [facility]? Call back     Best call back number: [phone]    Additional Information:

## 2020-05-13 ENCOUNTER — LAB VISIT (OUTPATIENT)
Dept: FAMILY MEDICINE | Facility: CLINIC | Age: 80
End: 2020-05-13
Payer: MEDICARE

## 2020-05-13 DIAGNOSIS — J84.9 ILD (INTERSTITIAL LUNG DISEASE): ICD-10-CM

## 2020-05-13 PROCEDURE — U0002 COVID-19 LAB TEST NON-CDC: HCPCS

## 2020-05-14 LAB — SARS-COV-2 RNA RESP QL NAA+PROBE: NOT DETECTED

## 2020-05-15 ENCOUNTER — HOSPITAL ENCOUNTER (OUTPATIENT)
Dept: RESPIRATORY THERAPY | Facility: HOSPITAL | Age: 80
Discharge: HOME | End: 2020-05-15
Attending: INTERNAL MEDICINE
Payer: MEDICARE

## 2020-05-15 VITALS — RESPIRATION RATE: 18 BRPM | HEART RATE: 61 BPM | OXYGEN SATURATION: 99 %

## 2020-05-15 DIAGNOSIS — J84.9 ILD (INTERSTITIAL LUNG DISEASE): ICD-10-CM

## 2020-05-15 LAB
BRPFT: ABNORMAL
DLCO ADJ PRE: 8.23 ML/(MIN*MMHG) (ref 17.28–31.14)
DLCO SINGLE BREATH LLN: 17.28
DLCO SINGLE BREATH PRE REF: 34 %
DLCO SINGLE BREATH REF: 24.21
DLCOC SBVA LLN: 2.39
DLCOC SBVA PRE REF: 55.7 %
DLCOC SBVA REF: 3.59
DLCOC SINGLE BREATH LLN: 17.28
DLCOC SINGLE BREATH PRE REF: 34 %
DLCOC SINGLE BREATH REF: 24.21
DLCOVA LLN: 2.39
DLCOVA PRE REF: 55.7 %
DLCOVA PRE: 2 ML/(MIN*MMHG*L) (ref 2.39–4.79)
DLCOVA REF: 3.59
DLVAADJ PRE: 2 ML/(MIN*MMHG*L) (ref 2.39–4.79)
ERVN2 LLN: -16449.08
ERVN2 PRE REF: 63 %
ERVN2 PRE: 0.58 L (ref -16449.08–16450.92)
ERVN2 REF: 0.92
FEF 25 75 CHG: 25.8 %
FEF 25 75 LLN: 0.79
FEF 25 75 POST REF: 29 %
FEF 25 75 PRE REF: 23 %
FEF 25 75 REF: 2.02
FET100 CHG: -11.9 %
FEV1 CHG: -3.7 %
FEV1 FVC CHG: 4 %
FEV1 FVC LLN: 61
FEV1 FVC POST REF: 74.1 %
FEV1 FVC PRE REF: 71.2 %
FEV1 FVC REF: 75
FEV1 LLN: 1.95
FEV1 POST REF: 55.2 %
FEV1 PRE REF: 57.4 %
FEV1 REF: 2.78
FRCN2 LLN: 2.66
FRCN2 PRE REF: 75.9 %
FRCN2 REF: 3.65
FVC CHG: -7.4 %
FVC LLN: 2.7
FVC POST REF: 74.1 %
FVC PRE REF: 79.9 %
FVC REF: 3.72
IVC PRE: 2.39 L (ref 2.7–4.74)
IVC SINGLE BREATH LLN: 2.7
IVC SINGLE BREATH PRE REF: 64.2 %
IVC SINGLE BREATH REF: 3.72
PEF CHG: -8.8 %
PEF LLN: 4.99
PEF POST REF: 44.5 %
PEF PRE REF: 48.8 %
PEF REF: 7.18
POST FEF 25 75: 0.59 L/S (ref 0.79–3.25)
POST FET 100: 11.03 SEC
POST FEV1 FVC: 55.75 % (ref 60.79–89.78)
POST FEV1: 1.54 L (ref 1.95–3.62)
POST FVC: 2.75 L (ref 2.7–4.74)
POST PEF: 3.19 L/S (ref 4.99–9.37)
PRE DLCO: 8.23 ML/(MIN*MMHG) (ref 17.28–31.14)
PRE FEF 25 75: 0.47 L/S (ref 0.79–3.25)
PRE FET 100: 12.52 SEC
PRE FEV1 FVC: 53.63 % (ref 60.79–89.78)
PRE FEV1: 1.59 L (ref 1.95–3.62)
PRE FRC N2: 2.77 L
PRE FVC: 2.97 L (ref 2.7–4.74)
PRE PEF: 3.5 L/S (ref 4.99–9.37)
RVN2 LLN: 2.06
RVN2 PRE REF: 80.2 %
RVN2 PRE: 2.19 L (ref 2.06–3.4)
RVN2 REF: 2.73
RVN2TLCN2 LLN: 35.01
RVN2TLCN2 PRE REF: 103.3 %
RVN2TLCN2 PRE: 45.44 % (ref 35.01–52.97)
RVN2TLCN2 REF: 43.99
TLCN2 LLN: 5.59
TLCN2 PRE REF: 71.5 %
TLCN2 PRE: 4.82 L (ref 5.59–7.89)
TLCN2 REF: 6.74
VA PRE: 4.12 L (ref 6.59–6.59)
VA SINGLE BREATH LLN: 6.59
VA SINGLE BREATH PRE REF: 62.5 %
VA SINGLE BREATH REF: 6.59
VCMAXN2 LLN: 2.7
VCMAXN2 PRE REF: 70.7 %
VCMAXN2 PRE: 2.63 L (ref 2.7–4.74)
VCMAXN2 REF: 3.72

## 2020-05-15 PROCEDURE — 94727 GAS DIL/WSHOT DETER LNG VOL: CPT | Mod: 26,,, | Performed by: INTERNAL MEDICINE

## 2020-05-15 PROCEDURE — 94729 PR C02/MEMBANE DIFFUSE CAPACITY: ICD-10-PCS | Mod: 26,,, | Performed by: INTERNAL MEDICINE

## 2020-05-15 PROCEDURE — 94618 PULMONARY STRESS TESTING: CPT

## 2020-05-15 PROCEDURE — 94060 PR EVAL OF BRONCHOSPASM: ICD-10-PCS | Mod: 26,59,, | Performed by: INTERNAL MEDICINE

## 2020-05-15 PROCEDURE — 94618 PULMONARY STRESS TESTING: ICD-10-PCS | Mod: 26,,, | Performed by: INTERNAL MEDICINE

## 2020-05-15 PROCEDURE — 94727 PR PULM FUNCTION TEST BY GAS: ICD-10-PCS | Mod: 26,,, | Performed by: INTERNAL MEDICINE

## 2020-05-15 PROCEDURE — 25000242 PHARM REV CODE 250 ALT 637 W/ HCPCS: Performed by: INTERNAL MEDICINE

## 2020-05-15 PROCEDURE — 94060 EVALUATION OF WHEEZING: CPT | Mod: 26,59,, | Performed by: INTERNAL MEDICINE

## 2020-05-15 PROCEDURE — 94729 DIFFUSING CAPACITY: CPT | Mod: 26,,, | Performed by: INTERNAL MEDICINE

## 2020-05-15 PROCEDURE — 94618 PULMONARY STRESS TESTING: CPT | Mod: 26,,, | Performed by: INTERNAL MEDICINE

## 2020-05-15 RX ORDER — ALBUTEROL SULFATE 2.5 MG/.5ML
2.5 SOLUTION RESPIRATORY (INHALATION) ONCE
Status: COMPLETED | OUTPATIENT
Start: 2020-05-15 | End: 2020-05-15

## 2020-05-15 RX ADMIN — ALBUTEROL SULFATE 2.5 MG: 2.5 SOLUTION RESPIRATORY (INHALATION) at 11:05

## 2020-05-19 ENCOUNTER — TELEPHONE (OUTPATIENT)
Dept: PULMONOLOGY | Facility: CLINIC | Age: 80
End: 2020-05-19

## 2020-05-19 ENCOUNTER — PATIENT OUTREACH (OUTPATIENT)
Dept: ADMINISTRATIVE | Facility: HOSPITAL | Age: 80
End: 2020-05-19

## 2020-05-19 RX ORDER — SYRINGE AND NEEDLE,INSULIN,1ML 31GX15/64"
SYRINGE, EMPTY DISPOSABLE MISCELLANEOUS
COMMUNITY
Start: 2020-05-11

## 2020-05-19 RX ORDER — NITROGLYCERIN 0.4 MG/1
TABLET SUBLINGUAL
COMMUNITY
Start: 2020-04-14 | End: 2021-05-13

## 2020-05-19 NOTE — TELEPHONE ENCOUNTER
----- Message from Jareth Alexandre MD sent at 5/19/2020 10:18 AM CDT -----  Please advise patient that breathing test is the similar to prior breathing test in 2019.  Still with moderate COPD.

## 2020-05-21 ENCOUNTER — TELEPHONE (OUTPATIENT)
Dept: PULMONOLOGY | Facility: CLINIC | Age: 80
End: 2020-05-21

## 2020-05-21 NOTE — TELEPHONE ENCOUNTER
Contacted patient and discussed pft results. Patient denies any additional questions or concerns.

## 2020-05-26 ENCOUNTER — TELEPHONE (OUTPATIENT)
Dept: PULMONOLOGY | Facility: CLINIC | Age: 80
End: 2020-05-26

## 2020-07-02 ENCOUNTER — OFFICE VISIT (OUTPATIENT)
Dept: PODIATRY | Facility: CLINIC | Age: 80
End: 2020-07-02
Payer: MEDICARE

## 2020-07-02 VITALS
BODY MASS INDEX: 27.58 KG/M2 | WEIGHT: 182 LBS | SYSTOLIC BLOOD PRESSURE: 146 MMHG | DIASTOLIC BLOOD PRESSURE: 78 MMHG | HEIGHT: 68 IN

## 2020-07-02 DIAGNOSIS — L84 CORN OR CALLUS: ICD-10-CM

## 2020-07-02 DIAGNOSIS — R20.2 PARESTHESIA OF BOTH FEET: ICD-10-CM

## 2020-07-02 DIAGNOSIS — B35.1 ONYCHOMYCOSIS DUE TO DERMATOPHYTE: ICD-10-CM

## 2020-07-02 DIAGNOSIS — E11.49 TYPE II DIABETES MELLITUS WITH NEUROLOGICAL MANIFESTATIONS: ICD-10-CM

## 2020-07-02 PROCEDURE — 11056 PR TRIM BENIGN HYPERKERATOTIC SKIN LESION,2-4: ICD-10-PCS | Mod: Q9,S$GLB,, | Performed by: PODIATRIST

## 2020-07-02 PROCEDURE — 11721 PR DEBRIDEMENT OF NAILS, 6 OR MORE: ICD-10-PCS | Mod: 59,Q9,S$GLB, | Performed by: PODIATRIST

## 2020-07-02 PROCEDURE — 11056 PARNG/CUTG B9 HYPRKR LES 2-4: CPT | Mod: Q9,S$GLB,, | Performed by: PODIATRIST

## 2020-07-02 PROCEDURE — 11721 DEBRIDE NAIL 6 OR MORE: CPT | Mod: 59,Q9,S$GLB, | Performed by: PODIATRIST

## 2020-07-02 PROCEDURE — 99999 PR PBB SHADOW E&M-EST. PATIENT-LVL IV: ICD-10-PCS | Mod: PBBFAC,,, | Performed by: PODIATRIST

## 2020-07-02 PROCEDURE — 99499 UNLISTED E&M SERVICE: CPT | Mod: S$GLB,,, | Performed by: PODIATRIST

## 2020-07-02 PROCEDURE — 99999 PR PBB SHADOW E&M-EST. PATIENT-LVL IV: CPT | Mod: PBBFAC,,, | Performed by: PODIATRIST

## 2020-07-02 PROCEDURE — 99499 NO LOS: ICD-10-PCS | Mod: S$GLB,,, | Performed by: PODIATRIST

## 2020-07-02 NOTE — PROGRESS NOTES
Subjective:      Patient ID: Percy Ramirez is a 79 y.o. male.    Chief Complaint: Diabetes Mellitus, Nail Care, Foot Pain, and Foot Problem    Percy is a 79 y.o. male who presents to the clinic for evaluation and treatment of high risk feet. Percy has a past medical history of Allergy, Arthritis, CAD, multiple vessel, Cataract, Depression, Hyperlipidemia, Hypertension, Macular degeneration, S/P CABG x 2, and Type 2 diabetes mellitus with circulatory disorder. The patient's chief complaint is elongated, thickened toenails aggravated by increased weight bearing, shoe gear, pressure.    Patient admits to barefoot walking often in and around home    Patient complains of numbness, tingling, burning to the feet not currently well controlled with gabapentin or Cymbalta. He relates that gabapentin makes him very drowsy    This patient has documented high risk feet requiring routine maintenance secondary to diabetes mellitis and those secondary complications of diabetes, as mentioned..    PCP: Kasie Edmondson MD    Date Last Seen by PCP:   Chief Complaint   Patient presents with    Diabetes Mellitus    Nail Care    Foot Pain    Foot Problem     Current shoe gear:  rx shoes    Hemoglobin A1C   Date Value Ref Range Status   05/05/2020 7.9 (H) % Final     Comment:     [facility]   02/12/2020 8.2 (H) 4.0 - 5.6 % Final     Comment:     Dr. Philippe Aquino ( Endocrinology )   11/05/2019 7.9 (A) 4.0 - 5.6 % Final     Comment:     Dr. Philippe Aquino ( Endocrinology )     Patient Active Problem List   Diagnosis    Major depressive disorder, recurrent episode, mild    Hypertension    Type 2 diabetes, uncontrolled, with retinopathy    Coronary artery disease    S/P CABG x 2    Macular degeneration    Obstructive sleep apnea treated with BiPAP    Anxiety state, unspecified    Insulin long-term use    Primary osteoarthritis of both knees    Chronic low back pain    DJD (degenerative joint disease), lumbar    Type 2  "diabetes, uncontrolled, with neuropathy    Bilateral carotid artery disease    Hyperlipidemia LDL goal <100    Type 2 diabetes, uncontrolled, with peripheral circulatory disorder    COPD with chronic bronchitis and emphysema    Atherosclerosis of abdominal aorta    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    Overweight (BMI 25.0-29.9)    Paroxysmal atrial fibrillation    Chronic stable angina    Senile purpura    Osteoarthritis of carpometacarpal (CMC) joint of left thumb    MCI (mild cognitive impairment)    Chronic vertigo    Pulmonary nodule    Dyspnea on exertion    Chronic respiratory failure with hypoxia, on home O2 therapy    Chronic combined systolic and diastolic heart failure    Ambulates with cane    ILD (interstitial lung disease)     Current Outpatient Medications on File Prior to Visit   Medication Sig Dispense Refill    albuterol (PROVENTIL/VENTOLIN HFA) 90 mcg/actuation inhaler Rescue 18 g 0    atorvastatin (LIPITOR) 40 MG tablet Take 40 mg by mouth.      atorvastatin (LIPITOR) 40 MG tablet Take 40 mg by mouth once daily.      azelastine (ASTELIN) 137 mcg (0.1 %) nasal spray 1 spray (137 mcg total) by Nasal route 2 (two) times daily. 30 mL 2    BD INSULIN PEN NEEDLE UF SHORT 31 gauge x 5/16" Ndle       BD INSULIN SYRINGE ULTRA-FINE 1/2 mL 31 gauge x 15/64" Syrg       carvedilol (COREG) 25 MG tablet Take 1 tablet (25 mg total) by mouth 2 (two) times daily. 180 tablet 0    cinnamon bark 500 mg capsule Take 1,000 mg by mouth once daily.        clopidogrel (PLAVIX) 75 mg tablet Take 75 mg by mouth once daily.      donepezil (ARICEPT) 10 MG tablet Take 1 tablet (10 mg total) by mouth every evening. 30 tablet 11    DULoxetine (CYMBALTA) 30 MG capsule TAKE 1 CAPSULE BY MOUTH  ONCE DAILY 90 capsule 1    fluticasone-umeclidin-vilanter (TRELEGY ELLIPTA) 100-62.5-25 mcg DsDv Inhale 1 puff into the lungs once daily. 60 each 3    furosemide " "(LASIX) 40 MG tablet Take 40 mg by mouth once daily.       gabapentin (NEURONTIN) 300 MG capsule Take 4 capsules (1,200 mg total) by mouth 2 (two) times daily. 540 capsule 1    glimepiride (AMARYL) 4 MG tablet Take 4 mg by mouth daily with breakfast.       insulin NPH-insulin regular, 70/30, (NOVOLIN 70/30) 100 unit/mL (70-30) injection Inject into the skin. Inject 5 units at breakfast and inject 5 units at night      insulin syringe-needle U-100 0.3 mL 31 gauge x 15/64" Syrg USE TO INJECT INSULIN THREE TIMES DAILY      insulin syringe-needle U-100 1/2 mL 31 x 5/16" Syrg       isosorbide mononitrate (IMDUR) 30 MG 24 hr tablet Take 30 mg by mouth once daily.      losartan (COZAAR) 100 MG tablet Take 1 tablet (100 mg total) by mouth once daily. 90 tablet 1    meclizine (ANTIVERT) 12.5 mg tablet Take 1 tablet (12.5 mg total) by mouth 3 (three) times daily as needed for Dizziness. 252 tablet 1    metformin (GLUCOPHAGE) 500 MG tablet Take 1,000 mg by mouth 2 (two) times daily with meals. 2 Tablets in the morning, 2 Tablets in the evening      nitroGLYCERIN (NITROSTAT) 0.3 MG SL tablet PLACE 1 TABLET UNDER TONGUE AS NEEDED FOR CHEST PAIN EVERY 5 MINUTES, UP TO 3 DOSES IN 15MINUTES 100 tablet 0    nitroGLYCERIN (NITROSTAT) 0.4 MG SL tablet Place 1 tab under the tongue every 5 minutes for chest pain. Max 3 tabs in 15 minutes. Call 911 for unrelieved chest pain.      nitroGLYCERIN (NITROSTAT) 0.4 MG SL tablet       nitroGLYCERIN (NITROSTAT) 0.4 MG SL tablet DISSOLVE 1 TABLET UNDER THE TONGUE EVERY 5 MINUTES FOR  CHEST PAIN . MAX 3 TABS IN  15 MINUTES, CALL 911 IF  CHEST PAIN PERSISTS.      OM-3/DHA/EPA/FISH OIL/VIT D3 (FISH OIL-VIT D3 ORAL) Take 2,000 Units by mouth once daily.       pravastatin (PRAVACHOL) 20 MG tablet Take 1 tablet (20 mg total) by mouth once daily. 90 tablet 1    pregabalin (LYRICA) 150 MG capsule Take 150 mg by mouth.      rivastigmine tartrate (EXELON) 1.5 MG capsule Take 1 capsule (1.5 " mg total) by mouth 2 (two) times daily. 180 capsule 3    spironolactone (ALDACTONE) 25 MG tablet 25 mg once daily.       VIT C/VIT E AC/LUT/COPPER/ZINC (PRESERVISION LUTEIN ORAL) Take by mouth.      warfarin (COUMADIN) 5 MG tablet Take 2 tabs by mouth on Tuesday,Thursday, Saturday and Sundays then 1.5 on all other days.      warfarin (COUMADIN) 7.5 MG tablet Take by mouth.      [DISCONTINUED] diazepam (VALIUM) 2 MG tablet Take 2 mg by mouth continuous prn.       No current facility-administered medications on file prior to visit.      Review of patient's allergies indicates:   Allergen Reactions    Codeine Nausea Only     weak     Past Surgical History:   Procedure Laterality Date    broken elbow      left    COLONOSCOPY N/A 10/4/2017    Procedure: COLONOSCOPY;  Surgeon: Gabriel Leung MD;  Location[facility];  Service: Endoscopy;  Laterality: N/A;    EYE SURGERY      cataract    heart bypass      2004    HERNIA REPAIR      right    ROTATOR CUFF REPAIR      right  2004     Family History   Problem Relation Age of Onset    Arthritis Mother     Diabetes Mother     Stroke Mother     Heart attack Father     Cancer Brother     Throat cancer Brother     Diabetes Maternal Aunt     Diabetes Maternal Uncle     Heart disease Maternal Uncle     Diabetes Paternal Aunt     Heart disease Paternal Aunt     Heart disease Paternal Uncle     Heart disease Maternal Grandmother     Cancer Maternal Grandfather      Social History     Socioeconomic History    Marital status:      Spouse name: Not on file    Number of children: 4    Years of education: GED    Highest education level: Not on file   Occupational History    Occupation: retired tug    Social Needs    Financial resource strain: Not on file    Food insecurity     Worry: Not on file     Inability: Not on file    Transportation needs     Medical: Not on file     Non-medical: Not on file   Tobacco Use    Smoking status:  "Former Smoker     Packs/day: 3.00     Years: 45.00     Pack years: 135.00     Types: Cigarettes     Quit date: 2004     Years since quittin.2    Smokeless tobacco: Never Used   Substance and Sexual Activity    Alcohol use: Yes     Comment: was heavy drinker 35 years ago, rare use now    Drug use: No    Sexual activity: Yes     Partners: Female   Lifestyle    Physical activity     Days per week: Not on file     Minutes per session: Not on file    Stress: Not on file   Relationships    Social connections     Talks on phone: Not on file     Gets together: Not on file     Attends [employer]ism service: Not on file     Active member of club or organization: Not on file     Attends meetings of clubs or organizations: Not on file     Relationship status: Not on file   Other Topics Concern    Not on file   Social History Narrative    Not on file     Review of Systems   Constitution: Negative for chills, fever and weakness.   Cardiovascular: Negative for claudication and leg swelling.   Respiratory: Positive for cough. Negative for shortness of breath.    Skin: Positive for dry skin and nail changes. Negative for itching and rash.   Musculoskeletal: Positive for arthritis, back pain and joint pain. Negative for falls, joint swelling and muscle weakness.   Gastrointestinal: Negative for diarrhea, nausea and vomiting.   Neurological: Positive for numbness and paresthesias. Negative for tremors.   Psychiatric/Behavioral: Negative for altered mental status and hallucinations.           Objective:       Vitals:    20 0852   BP: (!) 146/78   Weight: 82.6 kg (182 lb)   Height: 5' 8" (1.727 m)   PainSc: 10-Worst pain ever   PainLoc: Foot       Physical Exam   Constitutional:   General: Pt. is well-developed, well-nourished, appears stated age, in no acute distress, alert and oriented x 3. No evidence of depression, anxiety, or agitation. Calm, cooperative, and communicative. Appropriate interactions and " affect.       Cardiovascular:   Pulses:       Dorsalis pedis pulses are 2+ on the right side, and 2+ on the left side.        Posterior tibial pulses are 1+ on the right side, and 1+ on the left side.   There is decreased digital hair.   Musculoskeletal:        Right foot: There is normal range of motion.        Left foot:  There is normal range of motion.   Muscle strength is 5/5 in all groups bilaterally.    Decreased stride, station of gait.  apropulsive toe off.  Increased angle and base of gait.    Patient has hammertoes of digits 2-5 bilateral partially reducible without symptom today.    Visible and palpable bunion without pain at dorsomedial 1st metatarsal head right and left.  Hallux abducted right and left partially reducible, tracks laterally without being track bound.  No ecchymosis, erythema, edema, or cardinal signs infection or signs of trauma same foot.     Neurological: A sensory deficit is present.   [place] 5.07 monofilamant testing is diminished Farhad feet. Sharp/dull sensation diminished Bilaterally   Skin: Skin is warm, dry and intact. No abrasion, no ecchymosis, no lesion and no rash noted. He is not diaphoretic. No cyanosis or erythema. No pallor. Nails show no clubbing.   Toenails 1-5 bilaterally are elongated by 2-3 mm, thickened by 2-3 mm, discolored/yellowed, dystrophic, brittle with subungual debris.    Focal hyperkeratotic lesion consisting entirely of hyperkeratotic tissue without open skin, drainage, pus, fluctuance, malodor, or signs of infection: medial IPJ of hallux farhad    Interdigital Spaces clean, dry and without evidence of break in skin integrity   Psychiatric: He has a normal mood and affect. His speech is normal.   Nursing note and vitals reviewed.        Assessment:       Encounter Diagnoses   Name Primary?    Type 2 diabetes, uncontrolled, with peripheral circulatory disorder Yes    Type II diabetes mellitus with neurological manifestations     Onychomycosis due  to dermatophyte     Corn or callus     Paresthesia of both feet          Plan:       Percy was seen today for diabetes mellitus, nail care, foot pain and foot problem.    Diagnoses and all orders for this visit:    Type 2 diabetes, uncontrolled, with peripheral circulatory disorder    Type II diabetes mellitus with neurological manifestations    Onychomycosis due to dermatophyte    Corn or callus    Paresthesia of both feet      I counseled the patient on his conditions, their implications and medical management.      - Shoe inspection. Diabetic Foot Education. Patient reminded of the importance of good nutrition and blood sugar control to help prevent podiatric complications of diabetes. Patient instructed on proper foot hygeine. We discussed wearing proper shoe gear, daily foot inspections, never walking without protective shoe gear, never putting sharp instruments to feet    - With patient's permission, nails were aggressively reduced and debrided x 10 to their soft tissue attachment mechanically and with electric , removing all offending nail and debris. Patient relates relief following the procedure.    - After cleansing the  area w/ alcohol prep pad the above mentioned hyperkeratosis was trimmed utilizing No 15 scapel, to a smooth base with out incident. Patient tolerated this  well and reported comfort to the area of medial hallux IPJ concha    Patient already under the care of a neurologist.      - He will continue to monitor the areas daily, inspect his feet, wear protective shoe gear when ambulatory, moisturizer to maintain skin integrity and follow in this office in approximately 2-3 months, sooner PRN

## 2020-07-02 NOTE — PATIENT INSTRUCTIONS
Recommend lotions: eucerin, eucerin for diabetics, aquaphor, A&D ointment, gold bond for diabetics, sween, Tohatchi's Bees all purpose baby ointment,  urea 40 with aloe (found on amazon.com)    Shoe recommendations: (try 6pm.com, zappos.com , nordstromrack.Shopzilla, or shoes.Shopzilla for discounted prices) you can visit [employer] shoes in [place]  or [employer] in the [facility] (there are also several shoe brand outlets in the [facility])    Asics (GT 2000 or gel foundations), new balance stability type shoes, saucony (stabil c3),  Mahoney (GTS or Beast or transcend), propet (tennis shoe)    Sofft Brand or axxiom (women) Gustabo&Billy (men), clarks, crocs, aerosoles, naturalizers, SAS, ecco, born, ngoc lara, rockports (dress shoes)    Vionic, burkenstocks, fitflops, propet (sandals)  Nike comfort thong sandals, crocs, propet (house shoes)    Nail Home remedy:  Vicks Vapor rub to nails for easier manageability    Diabetes: Inspecting Your Feet  Diabetes increases your chances of developing foot problems. So inspect your feet every day. This helps you find small skin irritations before they become serious infections. If you have trouble seeing the bottoms of your feet, use a mirror or ask a family member or friend to help.     Pressure spots on the bottom of the foot are common areas where problems develop.   How to check your feet  Below are tips to help you look for foot problems. Try to check your feet at the same time each day, such as when you get out of bed in the morning:  · Check the top of each foot. The tops of toes, back of the heel, and outer edge of the foot can get a lot of rubbing from poor-fitting shoes.  · Check the bottom of each foot. Daily wear and tear often leads to problems at pressure spots.  · Check the toes and nails. Fungal infections often occur between toes. Toenail problems can also be a sign of fungal infections or lead to breaks in the skin.  · Check your shoes, too. Loose objects inside a shoe can  injure the foot. Use your hand to feel inside your shoes for things like alfredo, loose stitching, or rough areas that could irritate your skin.  Warning signs  Look for any color changes in the foot. Redness with streaks can signal a severe infection, which needs immediate medical attention. Tell your doctor right away if you have any of these problems:  · Swelling, sometimes with color changes, may be a sign of poor blood flow or infection. Symptoms include tenderness and an increase in the size of your foot.  · Warm or hot areas on your feet may be signs of infection. A foot that is cold may not be getting enough blood.  · Sensations such as burning, tingling, or pins and needles can be signs of a problem. Also check for areas that may be numb.  · Hot spots are caused by friction or pressure. Look for hot spots in areas that get a lot of rubbing. Hot spots can turn into blisters, calluses, or sores.  · Cracks and sores are caused by dry or irritated skin. They are a sign that the skin is breaking down, which can lead to infection.  · Toenail problems to watch for include nails growing into the skin (ingrown toenail) and causing redness or pain. Thick, yellow, or discolored nails can signal a fungal infection.  · Drainage and odor can develop from untreated sores and ulcers. Call your doctor right away if you notice white or yellow drainage, bleeding, or unpleasant odor.   © 3716-6372 [employer]. [address] [place] [address]. All rights reserved. This information is not intended as a substitute for professional medical care. Always follow your healthcare professional's instructions.        Step-by-Step:  Inspecting Your Feet (Diabetes)    Date Last Reviewed: 10/1/2016  © 9503-8019 [employer]. [address] [place] [address]. All rights reserved. This information is not intended as a substitute for professional medical care. Always follow your healthcare  professional's instructions.

## 2020-07-16 LAB
LEFT EYE DM RETINOPATHY: POSITIVE
RIGHT EYE DM RETINOPATHY: POSITIVE

## 2020-07-20 DIAGNOSIS — G31.84 MCI (MILD COGNITIVE IMPAIRMENT): ICD-10-CM

## 2020-07-20 NOTE — TELEPHONE ENCOUNTER
Sent to Dr. Victoria.            ----- Message from Irina Nunez sent at 7/20/2020 10:10 AM CDT -----  Type: RX Refill Request    Who Called:  Self   Have you contacted your pharmacy: no   Refill or New Rx: Refill     RX Name and Strength:rivastigmine tartrate (EXELON) 1.5 MG capsule      Preferred Pharmacy with phone number:[facility] - [address] [phone] (Phone)  [phone] (Fax)        Local or Mail Order: Mail     Ordering Provider: Dr. Victoria   Would the patient rather a call back or a response via [facility]?  Call   Best Call Back Number:[phone] (home)

## 2020-07-22 RX ORDER — RIVASTIGMINE TARTRATE 1.5 MG/1
1.5 CAPSULE ORAL 2 TIMES DAILY
Qty: 180 CAPSULE | Refills: 3 | Status: SHIPPED | OUTPATIENT
Start: 2020-07-22 | End: 2021-05-13

## 2020-08-04 ENCOUNTER — TELEPHONE (OUTPATIENT)
Dept: FAMILY MEDICINE | Facility: CLINIC | Age: 80
End: 2020-08-04

## 2020-08-04 ENCOUNTER — LAB VISIT (OUTPATIENT)
Dept: LAB | Facility: HOSPITAL | Age: 80
End: 2020-08-04
Payer: MEDICARE

## 2020-08-04 ENCOUNTER — OFFICE VISIT (OUTPATIENT)
Dept: FAMILY MEDICINE | Facility: CLINIC | Age: 80
End: 2020-08-04
Payer: MEDICARE

## 2020-08-04 VITALS
OXYGEN SATURATION: 94 % | TEMPERATURE: 99 F | DIASTOLIC BLOOD PRESSURE: 74 MMHG | WEIGHT: 177.56 LBS | SYSTOLIC BLOOD PRESSURE: 122 MMHG | BODY MASS INDEX: 26.91 KG/M2 | HEIGHT: 68 IN | HEART RATE: 72 BPM

## 2020-08-04 DIAGNOSIS — J96.11 CHRONIC RESPIRATORY FAILURE WITH HYPOXIA, ON HOME O2 THERAPY: ICD-10-CM

## 2020-08-04 DIAGNOSIS — I77.9 BILATERAL CAROTID ARTERY DISEASE, UNSPECIFIED TYPE: ICD-10-CM

## 2020-08-04 DIAGNOSIS — R04.2 COUGH WITH HEMOPTYSIS: ICD-10-CM

## 2020-08-04 DIAGNOSIS — E78.5 HYPERLIPIDEMIA LDL GOAL <100: ICD-10-CM

## 2020-08-04 DIAGNOSIS — D69.2 SENILE PURPURA: ICD-10-CM

## 2020-08-04 DIAGNOSIS — Z99.81 CHRONIC RESPIRATORY FAILURE WITH HYPOXIA, ON HOME O2 THERAPY: ICD-10-CM

## 2020-08-04 DIAGNOSIS — I48.0 PAROXYSMAL ATRIAL FIBRILLATION: ICD-10-CM

## 2020-08-04 DIAGNOSIS — Z79.4 INSULIN LONG-TERM USE: ICD-10-CM

## 2020-08-04 DIAGNOSIS — J84.9 ILD (INTERSTITIAL LUNG DISEASE): ICD-10-CM

## 2020-08-04 DIAGNOSIS — F33.0 MAJOR DEPRESSIVE DISORDER, RECURRENT EPISODE, MILD: ICD-10-CM

## 2020-08-04 DIAGNOSIS — R04.2 COUGH WITH HEMOPTYSIS: Primary | ICD-10-CM

## 2020-08-04 DIAGNOSIS — I70.0 ATHEROSCLEROSIS OF ABDOMINAL AORTA: ICD-10-CM

## 2020-08-04 DIAGNOSIS — I50.22 CHRONIC SYSTOLIC HEART FAILURE: ICD-10-CM

## 2020-08-04 DIAGNOSIS — I20.89 CHRONIC STABLE ANGINA: ICD-10-CM

## 2020-08-04 DIAGNOSIS — J44.9 COPD MIXED TYPE: ICD-10-CM

## 2020-08-04 DIAGNOSIS — I10 ESSENTIAL HYPERTENSION: ICD-10-CM

## 2020-08-04 LAB
INR PPP: 3.4 (ref 0.8–1.2)
PROTHROMBIN TIME: 35.3 SEC (ref 9–12.5)

## 2020-08-04 PROCEDURE — 1159F MED LIST DOCD IN RCRD: CPT | Mod: S$GLB,,, | Performed by: NURSE PRACTITIONER

## 2020-08-04 PROCEDURE — 99214 OFFICE O/P EST MOD 30 MIN: CPT | Mod: S$GLB,,, | Performed by: NURSE PRACTITIONER

## 2020-08-04 PROCEDURE — 99999 PR PBB SHADOW E&M-EST. PATIENT-LVL V: ICD-10-PCS | Mod: PBBFAC,,, | Performed by: NURSE PRACTITIONER

## 2020-08-04 PROCEDURE — 3078F DIAST BP <80 MM HG: CPT | Mod: CPTII,S$GLB,, | Performed by: NURSE PRACTITIONER

## 2020-08-04 PROCEDURE — 1159F PR MEDICATION LIST DOCUMENTED IN MEDICAL RECORD: ICD-10-PCS | Mod: S$GLB,,, | Performed by: NURSE PRACTITIONER

## 2020-08-04 PROCEDURE — 1125F AMNT PAIN NOTED PAIN PRSNT: CPT | Mod: S$GLB,,, | Performed by: NURSE PRACTITIONER

## 2020-08-04 PROCEDURE — 99999 PR PBB SHADOW E&M-EST. PATIENT-LVL V: CPT | Mod: PBBFAC,,, | Performed by: NURSE PRACTITIONER

## 2020-08-04 PROCEDURE — 3074F SYST BP LT 130 MM HG: CPT | Mod: CPTII,S$GLB,, | Performed by: NURSE PRACTITIONER

## 2020-08-04 PROCEDURE — 1101F PR PT FALLS ASSESS DOC 0-1 FALLS W/OUT INJ PAST YR: ICD-10-PCS | Mod: CPTII,S$GLB,, | Performed by: NURSE PRACTITIONER

## 2020-08-04 PROCEDURE — 99499 UNLISTED E&M SERVICE: CPT | Mod: S$GLB,,, | Performed by: NURSE PRACTITIONER

## 2020-08-04 PROCEDURE — 85610 PROTHROMBIN TIME: CPT

## 2020-08-04 PROCEDURE — 36415 COLL VENOUS BLD VENIPUNCTURE: CPT | Mod: PO

## 2020-08-04 PROCEDURE — 3051F PR MOST RECENT HEMOGLOBIN A1C LEVEL 7.0 - < 8.0%: ICD-10-PCS | Mod: CPTII,S$GLB,, | Performed by: NURSE PRACTITIONER

## 2020-08-04 PROCEDURE — 99499 RISK ADDL DX/OHS AUDIT: ICD-10-PCS | Mod: S$GLB,,, | Performed by: NURSE PRACTITIONER

## 2020-08-04 PROCEDURE — 3051F HG A1C>EQUAL 7.0%<8.0%: CPT | Mod: CPTII,S$GLB,, | Performed by: NURSE PRACTITIONER

## 2020-08-04 PROCEDURE — 3078F PR MOST RECENT DIASTOLIC BLOOD PRESSURE < 80 MM HG: ICD-10-PCS | Mod: CPTII,S$GLB,, | Performed by: NURSE PRACTITIONER

## 2020-08-04 PROCEDURE — 1101F PT FALLS ASSESS-DOCD LE1/YR: CPT | Mod: CPTII,S$GLB,, | Performed by: NURSE PRACTITIONER

## 2020-08-04 PROCEDURE — 3074F PR MOST RECENT SYSTOLIC BLOOD PRESSURE < 130 MM HG: ICD-10-PCS | Mod: CPTII,S$GLB,, | Performed by: NURSE PRACTITIONER

## 2020-08-04 PROCEDURE — 99214 PR OFFICE/OUTPT VISIT, EST, LEVL IV, 30-39 MIN: ICD-10-PCS | Mod: S$GLB,,, | Performed by: NURSE PRACTITIONER

## 2020-08-04 PROCEDURE — 1125F PR PAIN SEVERITY QUANTIFIED, PAIN PRESENT: ICD-10-PCS | Mod: S$GLB,,, | Performed by: NURSE PRACTITIONER

## 2020-08-04 RX ORDER — DOXYCYCLINE 100 MG/1
100 CAPSULE ORAL EVERY 12 HOURS
Qty: 20 CAPSULE | Refills: 0 | Status: SHIPPED | OUTPATIENT
Start: 2020-08-04 | End: 2020-08-14

## 2020-08-04 NOTE — TELEPHONE ENCOUNTER
----- Message from Gage Pabloaux sent at 8/4/2020  9:11 AM CDT -----  Regarding: self  Type: Patient Call Back    Who called: self    What is the request in detail: pt would like a call about throat pain.he thinks it may be his inhaler causing the pain fluticasone-umeclidin-vilanter (TRELEGY ELLIPTA) 100-62.5-25 mcg DsDv    Can the clinic reply by [facility]? No     Would the patient rather a call back or a response via [facility]? C all    Best call back number: [phone]

## 2020-08-04 NOTE — TELEPHONE ENCOUNTER
I spoke with the patient and explained that I would fax his PT and INR results to Dr. Valderrama's office.

## 2020-08-04 NOTE — PATIENT INSTRUCTIONS
Check PT and INR since on Coumadin today  Doxycycline 100 mg one twice a day x 10 days   Appt with Dr. Edmondson after 11/5/2020  Fasting labs before appt one week

## 2020-08-04 NOTE — TELEPHONE ENCOUNTER
----- Message from Nataliia Cain sent at 8/4/2020  3:45 PM CDT -----  Name of Who is Calling: pt    What is the request in detail: states he is needing a copy of his lab results fax to his cardiologist office. Pt is asking to speak with staff. Please contact to further discuss and advise      Can the clinic reply by [facility]: no    What Number to [facility] Back [facility]: [phone]

## 2020-08-04 NOTE — TELEPHONE ENCOUNTER
Spoke with the patient and he is complaining of coughing up blood.  This started last week and the last time was yesterday.  Patient denies any other symptoms.  His throat is hurting from the coughing.  Scheduled an appt with Poonam.  Patient verbalized understandings.

## 2020-08-04 NOTE — PROGRESS NOTES
Subjective:       Patient ID: Percy Ramirez is a 79 y.o. male.    Chief Complaint: Sore Throat (4 days), Cough, and Hemoptysis (4 days)    79-year-old male presents to the clinic today with complaint of sore throat, coughing up sputum mixed with blood for the past 4 days.  He has chronic shortness of breath.  He denies any known fever, chills, sinus congestion, wheezing, abdominal pain, nausea, vomiting, or diarrhea.  He denies any cardiac chest pain, heart palpitations, or swelling to lower extremities.  He denies any headaches, dizziness, or blurred vision.  He states his blood sugar this morning was 265.    Sore Throat   Associated symptoms include coughing. Pertinent negatives include no abdominal pain, congestion, diarrhea, ear discharge, ear pain, headaches, shortness of breath or vomiting.   Cough  Associated symptoms include hemoptysis and a sore throat. Pertinent negatives include no chest pain, chills, ear pain, fever, headaches, postnasal drip, rhinorrhea, shortness of breath or wheezing.   Hemoptysis  Associated symptoms include coughing and a sore throat. Pertinent negatives include no abdominal pain, chest pain, chills, congestion, fever, headaches, nausea or vomiting.     Past Medical History:   Diagnosis Date    Allergy     Arthritis     CAD, multiple vessel     DR Melissa    Cataract     Depression     Hyperlipidemia     Hypertension     Macular degeneration     Dr Razo for injection, Jennifer for eye    S/P CABG x 2     Type 2 diabetes mellitus with circulatory disorder     Dr. Aquino     Past Surgical History:   Procedure Laterality Date    broken elbow      left    COLONOSCOPY N/A 10/4/2017    Procedure: COLONOSCOPY;  Surgeon: Gabriel Leung MD;  Location: [facility];  Service: Endoscopy;  Laterality: N/A;    EYE SURGERY      cataract    heart bypass      2004    HERNIA REPAIR      right    ROTATOR CUFF REPAIR      right  2004      reports that he quit smoking about 16 years  ago. His smoking use included cigarettes. He has a 135.00 pack-year smoking history. He has never used smokeless tobacco. He reports current alcohol use. He reports that he does not use drugs.  Review of Systems   Constitutional: Negative for chills and fever.   HENT: Positive for sore throat. Negative for congestion, ear discharge, ear pain, postnasal drip, rhinorrhea, sinus pressure and sinus pain.    Respiratory: Positive for cough and hemoptysis. Negative for shortness of breath and wheezing.         Cough mixed with sputum    Cardiovascular: Negative for chest pain, palpitations and leg swelling.   Gastrointestinal: Negative for abdominal pain, diarrhea, nausea and vomiting.   Musculoskeletal: Negative for gait problem.   Neurological: Negative for dizziness, light-headedness and headaches.       Objective:      Physical Exam  Constitutional:       General: He is not in acute distress.     Appearance: He is well-developed. He is not diaphoretic.   HENT:      Head: Normocephalic and atraumatic.      Right Ear: External ear normal.      Left Ear: External ear normal.      Nose: Nose normal.      Mouth/Throat:      Pharynx: No oropharyngeal exudate.   Eyes:      General: No scleral icterus.        Right eye: No discharge.         Left eye: No discharge.      Conjunctiva/sclera: Conjunctivae normal.      Pupils: Pupils are equal, round, and reactive to light.   Neck:      Musculoskeletal: Normal range of motion and neck supple.   Cardiovascular:      Rate and Rhythm: Normal rate and regular rhythm.      Heart sounds: No murmur. No friction rub. No gallop.       Comments: Mild cough no blood noted while in the clinic   Pulmonary:      Effort: Pulmonary effort is normal. No respiratory distress.      Breath sounds: Normal breath sounds. No wheezing or rales.   Abdominal:      General: Bowel sounds are normal.      Palpations: Abdomen is soft.      Tenderness: There is no abdominal tenderness.   Musculoskeletal:  Normal range of motion.   Lymphadenopathy:      Cervical: No cervical adenopathy.   Skin:     General: Skin is warm and dry.      Findings: No rash.   Neurological:      Mental Status: He is alert and oriented to person, place, and time.         Assessment:       1. Cough with hemoptysis    2. Type 2 diabetes, uncontrolled, with retinopathy    3. Essential hypertension    4. Chronic systolic heart failure    5. COPD mixed type    6. Senile purpura    7. ILD (interstitial lung disease)    8. Paroxysmal atrial fibrillation    9. Major depressive disorder, recurrent episode, mild    10. Atherosclerosis of abdominal aorta    11. Type 2 diabetes, uncontrolled, with neuropathy    12. Type 2 diabetes, uncontrolled, with peripheral circulatory disorder    13. Insulin long-term use    14. Chronic stable angina    15. Chronic respiratory failure with hypoxia, on home O2 therapy    16. Hyperlipidemia LDL goal <100    17. Bilateral carotid artery disease, unspecified type        Plan:         Cough with hemoptysis  -     Protime-INR; Future; Expected date: 08/04/2020    Type 2 diabetes, uncontrolled, with retinopathy  -     Comprehensive metabolic panel; Future; Expected date: 08/04/2020  -     Hemoglobin A1C; Future; Expected date: 08/04/2020    Essential hypertension  -     CBC auto differential; Future; Expected date: 08/04/2020  - The current medical regimen is effective;  continue present plan and medications.]    Chronic systolic heart failure  - The current medical regimen is effective;  continue present plan and medications.    COPD mixed type  -     doxycycline (VIBRAMYCIN) 100 MG Cap; Take 1 capsule (100 mg total) by mouth every 12 (twelve) hours. for 10 days  Dispense: 20 capsule; Refill: 0    Senile purpura  - stable     ILD (interstitial lung disease)  - The current medical regimen is effective;  continue present plan and medications.    Paroxysmal atrial fibrillation  - on coumadin]    Major depressive disorder,  recurrent episode, mild  - The current medical regimen is effective;  continue present plan and medications.    Atherosclerosis of abdominal aorta  - Stable / Asymptomatic is on blood pressure and cholesterol lowering medications    Type 2 diabetes, uncontrolled, with neuropathy  - The current medical regimen is effective;  continue present plan and medications.    Type 2 diabetes, uncontrolled, with peripheral circulatory disorder  - The current medical regimen is effective;  continue present plan and medications.    Insulin long-term use  - continue to monitor blood sugar closely    Chronic stable angina  - The current medical regimen is effective;  continue present plan and medications.    Chronic respiratory failure with hypoxia, on home O2 therapy  - on oxygen as needed    Hyperlipidemia LDL goal <100  - The current medical regimen is effective;  continue present plan and medications.    Bilateral carotid artery disease, unspecified type  - stable, continue to monitor

## 2020-08-05 ENCOUNTER — TELEPHONE (OUTPATIENT)
Dept: FAMILY MEDICINE | Facility: CLINIC | Age: 80
End: 2020-08-05

## 2020-08-05 NOTE — TELEPHONE ENCOUNTER
I left a message that his INR was 3.4 and Dr. Valderrama's office was probably going to want him to hold his coumadin today. I will fax your results to his office this am.

## 2020-08-06 ENCOUNTER — TELEPHONE (OUTPATIENT)
Dept: PULMONOLOGY | Facility: CLINIC | Age: 80
End: 2020-08-06

## 2020-08-06 NOTE — TELEPHONE ENCOUNTER
----- Message from Mahsa Tapia sent at 8/6/2020  9:08 AM CDT -----  Contact: Self [phone]  Type: Patient Call Back    Who called: Self    What is the request in detail: pt is calling in regards to questions that he has about his medications    Can the clinic reply by [facility]? Call back    Would the patient rather a call back or a response via [facility]? Call back    Best call back number: [phone]

## 2020-08-06 NOTE — TELEPHONE ENCOUNTER
Patient states that he has stop taking Trellegy since Tuesday due to sore throat. Patient states that he feels okay since not taking the medication. He states that his previous medication albuterol may have helped more than Trellegy. The only problem he has is tht his mouth is dry and Throat is itchy and has been taking antibiotics (Doxycyline Hyclate 100 mg capsule) since Tuesday.

## 2020-08-10 LAB
ALBUMIN CREATININE RATIO: 300 MG/G
ALBUMIN, URINE: 1.8 MG/L
CHOL/HDLC RATIO: 2.8
CHOLESTEROL, TOTAL: 101
CREATININE RANDOM URINE: 6 MG/DL (ref 20–320)
HBA1C MFR BLD: 8.7 % (ref 4–5.6)
HDLC SERPL-MCNC: 36 MG/DL
LDLC SERPL CALC-MCNC: 51 MG/DL
NONHDLC SERPL-MCNC: 65 MG/DL
TRIGL SERPL-MCNC: 65 MG/DL

## 2020-08-14 DIAGNOSIS — Z11.59 NEED FOR HEPATITIS C SCREENING TEST: ICD-10-CM

## 2020-09-01 ENCOUNTER — OFFICE VISIT (OUTPATIENT)
Dept: PULMONOLOGY | Facility: CLINIC | Age: 80
End: 2020-09-01
Payer: MEDICARE

## 2020-09-01 DIAGNOSIS — J44.89 COPD WITH CHRONIC BRONCHITIS AND EMPHYSEMA: ICD-10-CM

## 2020-09-01 DIAGNOSIS — J84.9 ILD (INTERSTITIAL LUNG DISEASE): ICD-10-CM

## 2020-09-01 DIAGNOSIS — J43.9 COPD WITH CHRONIC BRONCHITIS AND EMPHYSEMA: ICD-10-CM

## 2020-09-01 PROCEDURE — 99443 PR PHYSICIAN TELEPHONE EVALUATION 21-30 MIN: CPT | Mod: 95,,, | Performed by: INTERNAL MEDICINE

## 2020-09-01 PROCEDURE — 99443 PR PHYSICIAN TELEPHONE EVALUATION 21-30 MIN: ICD-10-PCS | Mod: 95,,, | Performed by: INTERNAL MEDICINE

## 2020-09-01 NOTE — PROGRESS NOTES
Established Patient - Audio Only Telehealth Visit     The patient location is: home  The chief complaint leading to consultation is: dyspnea  Visit type: Virtual visit with audio only (telephone)  Total time spent with patient: 25 minutes       The reason for the audio only service rather than synchronous audio and video virtual visit was related to technical difficulties or patient preference/necessity.     Each patient to whom I provide medical services by telemedicine is:  (1) informed of the relationship between the physician and patient and the respective role of any other health care provider with respect to management of the patient; and (2) notified that they may decline to receive medical services by telemedicine and may withdraw from such care at any time. Patient verbally consented to receive this service via voice-only telephone call.    Percy Ramirez  was seen as a follow up.        HISTORY OF PRESENT ILLNESS: Percy Ramirez is a 79 y.o. male with pmh of tobacco abuse, cad, tyler, htn, dyslipidemia, dm2 is here for pulmonary evaluation.  Our first encounter was 1/24/13.  At that time, patient with chronic dyspnea.  However, patient stated symptoms have gradually worsen since 2012.  +pelletier x 1/2.  No fever/chills.  No wheezing.  No coughing.  No orthopnea.  No pnd.  Patient with h/o 3 ppd x 40 years.  Quit smoking 2004.      In the past, patient was prescribed spiriva.  However, patient did not filled due to cost.  Currently on albuterol.  Patient underwent LDCT screening 2/11/19 by Dr. Edmondson.  CT demonstrated several ggo.      During last visit,  Patient have not fill spiriva due to cost.  Patient was provided prescription for trelegy.  copay is $45.  Have been using albuterol 3 times daily.  No fever/chil.  No chest .  Still with significant dyspnea.  Breathing is better with trelegy.  No coughing or wheezing.         PAST MEDICAL HISTORY:    Active Ambulatory Problems     Diagnosis Date Noted     Major depressive disorder, recurrent episode, mild     Hypertension     Type 2 diabetes, uncontrolled, with retinopathy     Coronary artery disease     S/P CABG x 2     Macular degeneration     Obstructive sleep apnea treated with BiPAP 07/27/2012    Anxiety state, unspecified 10/24/2013    Insulin long-term use 02/16/2015    Primary osteoarthritis of both knees 02/25/2015    Chronic low back pain 02/25/2015    DJD (degenerative joint disease), lumbar 04/30/2015    Type 2 diabetes, uncontrolled, with neuropathy 07/29/2016    Bilateral carotid artery disease 07/29/2016    Hyperlipidemia LDL goal <100 07/29/2016    Type 2 diabetes, uncontrolled, with peripheral circulatory disorder 07/29/2016    COPD with chronic bronchitis and emphysema 07/29/2016    Atherosclerosis of abdominal aorta 07/29/2016    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 07/29/2016    Overweight (BMI 25.0-29.9) 07/29/2016    Paroxysmal atrial fibrillation 03/06/2017    Chronic stable angina 03/06/2017    Senile purpura 03/06/2017    Osteoarthritis of carpometacarpal (CMC) joint of left thumb 11/22/2017    MCI (mild cognitive impairment) 06/18/2018    Chronic vertigo 02/11/2019    Pulmonary nodule 03/20/2019    Dyspnea on exertion 03/20/2019    Chronic respiratory failure with hypoxia, on home O2 therapy 09/26/2019    Chronic combined systolic and diastolic heart failure 11/30/2018    Ambulates with cane 11/06/2019    ILD (interstitial lung disease) 05/12/2020     Resolved Ambulatory Problems     Diagnosis Date Noted    Combined hyperlipidemia associated with type 2 diabetes mellitus     Fatigue 07/27/2012    Depressive disorder, not elsewhere classified 10/24/2013    Arrhythmia 12/30/2013    Dizziness of unknown cause 12/30/2013    Hypotension 06/13/2014    Hyponatremia 06/14/2014    Diabetic retinopathy of both eyes 10/23/2014    Diabetic neuropathy 02/25/2015    Poor  motor control of trunk 2015    IT band syndrome 2015    Impairment of balance 2015    Colon cancer screening 10/04/2017    Medication intolerance 2019     Past Medical History:   Diagnosis Date    Allergy     Arthritis     CAD, multiple vessel     Cataract     Depression     Hyperlipidemia     Type 2 diabetes mellitus with circulatory disorder                 PAST SURGICAL HISTORY:    Past Surgical History:   Procedure Laterality Date    broken elbow      left    COLONOSCOPY N/A 10/4/2017    Procedure: COLONOSCOPY;  Surgeon: Gabriel Leung MD;  [facility];  Service: Endoscopy;  Laterality: N/A;    EYE SURGERY      cataract    heart bypass      2004    HERNIA REPAIR      right    ROTATOR CUFF REPAIR      right  2004         FAMILY HISTORY:                Family History   Problem Relation Age of Onset    Arthritis Mother     Diabetes Mother     Stroke Mother     Heart attack Father     Cancer Brother     Throat cancer Brother     Diabetes Maternal Aunt     Diabetes Maternal Uncle     Heart disease Maternal Uncle     Diabetes Paternal Aunt     Heart disease Paternal Aunt     Heart disease Paternal Uncle     Heart disease Maternal Grandmother     Cancer Maternal Grandfather        SOCIAL HISTORY:          Tobacco:   Social History     Tobacco Use   Smoking Status Former Smoker    Packs/day: 3.00    Years: 45.00    Pack years: 135.00    Types: Cigarettes    Quit date: 2004    Years since quittin.3   Smokeless Tobacco Never Used       alcohol use:    Social History     Substance and Sexual Activity   Alcohol Use Yes    Comment: was heavy drinker 35 years ago, rare use now                 Occupation:  Retired     ALLERGIES:    Review of patient's allergies indicates:   Allergen Reactions    Aricept odt [donepezil] Diarrhea and Nausea And Vomiting    Codeine Nausea Only     weak    Ranexa [ranolazine]        CURRENT  "MEDICATIONS:    Current Outpatient Medications   Medication Sig Dispense Refill    albuterol (PROVENTIL/VENTOLIN HFA) 90 mcg/actuation inhaler Rescue 18 g 0    atorvastatin (LIPITOR) 40 MG tablet Take 40 mg by mouth.      atorvastatin (LIPITOR) 40 MG tablet Take 40 mg by mouth once daily.      azelastine (ASTELIN) 137 mcg (0.1 %) nasal spray 1 spray (137 mcg total) by Nasal route 2 (two) times daily. (Patient not taking: Reported on 8/4/2020) 30 mL 2    BD INSULIN PEN NEEDLE UF SHORT 31 gauge x 5/16" Ndle       BD INSULIN SYRINGE ULTRA-FINE 1/2 mL 31 gauge x 15/64" Syrg       carvedilol (COREG) 25 MG tablet Take 1 tablet (25 mg total) by mouth 2 (two) times daily. 180 tablet 0    cinnamon bark 500 mg capsule Take 1,000 mg by mouth once daily.        clopidogrel (PLAVIX) 75 mg tablet Take 75 mg by mouth once daily.      donepezil (ARICEPT) 10 MG tablet Take 1 tablet (10 mg total) by mouth every evening. 30 tablet 11    DULoxetine (CYMBALTA) 30 MG capsule TAKE 1 CAPSULE BY MOUTH  ONCE DAILY 90 capsule 1    fluticasone-umeclidin-vilanter (TRELEGY ELLIPTA) 100-62.5-25 mcg DsDv Inhale 1 puff into the lungs once daily. 60 each 3    furosemide (LASIX) 40 MG tablet Take 40 mg by mouth once daily.       gabapentin (NEURONTIN) 300 MG capsule Take 4 capsules (1,200 mg total) by mouth 2 (two) times daily. (Patient taking differently: Take 900 mg by mouth 2 (two) times daily. ) 540 capsule 1    glimepiride (AMARYL) 4 MG tablet Take 4 mg by mouth daily with breakfast.       insulin NPH-insulin regular, 70/30, (NOVOLIN 70/30) 100 unit/mL (70-30) injection Inject into the skin. Inject 5 units at breakfast and inject 5 units at night      insulin syringe-needle U-100 0.3 mL 31 gauge x 15/64" Syrg USE TO INJECT INSULIN THREE TIMES DAILY      insulin syringe-needle U-100 1/2 mL 31 x 5/16" Syrg       isosorbide mononitrate (IMDUR) 30 MG 24 hr tablet Take 30 mg by mouth once daily.      losartan (COZAAR) 100 MG " tablet Take 1 tablet (100 mg total) by mouth once daily. 90 tablet 1    meclizine (ANTIVERT) 12.5 mg tablet Take 1 tablet (12.5 mg total) by mouth 3 (three) times daily as needed for Dizziness. 252 tablet 1    metformin (GLUCOPHAGE) 500 MG tablet Take 1,000 mg by mouth 2 (two) times daily with meals. 2 Tablets in the morning, 2 Tablets in the evening      nitroGLYCERIN (NITROSTAT) 0.3 MG SL tablet PLACE 1 TABLET UNDER TONGUE AS NEEDED FOR CHEST PAIN EVERY 5 MINUTES, UP TO 3 DOSES IN 15MINUTES (Patient not taking: Reported on 8/4/2020) 100 tablet 0    nitroGLYCERIN (NITROSTAT) 0.4 MG SL tablet Place 1 tab under the tongue every 5 minutes for chest pain. Max 3 tabs in 15 minutes. Call 911 for unrelieved chest pain.      nitroGLYCERIN (NITROSTAT) 0.4 MG SL tablet       nitroGLYCERIN (NITROSTAT) 0.4 MG SL tablet DISSOLVE 1 TABLET UNDER THE TONGUE EVERY 5 MINUTES FOR  CHEST PAIN . MAX 3 TABS IN  15 MINUTES, CALL 911 IF  CHEST PAIN PERSISTS.      OM-3/DHA/EPA/FISH OIL/VIT D3 (FISH OIL-VIT D3 ORAL) Take 2,000 Units by mouth once daily.       pravastatin (PRAVACHOL) 20 MG tablet Take 1 tablet (20 mg total) by mouth once daily. 90 tablet 1    pregabalin (LYRICA) 150 MG capsule Take 150 mg by mouth.      rivastigmine tartrate (EXELON) 1.5 MG capsule Take 1 capsule (1.5 mg total) by mouth 2 (two) times daily. 180 capsule 3    spironolactone (ALDACTONE) 25 MG tablet 25 mg once daily.       VIT C/VIT E AC/LUT/COPPER/ZINC (PRESERVISION LUTEIN ORAL) Take by mouth.      warfarin (COUMADIN) 5 MG tablet Take 2 tabs by mouth on Tuesday,Thursday, Saturday and Sundays then 1.5 on all other days.      warfarin (COUMADIN) 7.5 MG tablet Take by mouth.       No current facility-administered medications for this visit.                   REVIEW OF SYSTEMS:     Pulmonary related symptoms as per HPI.  Gen:  no weight loss, no fever, no night sweat  HEENT:  + visual disturbance from macular degenration, no sore throat, + hearing  acuity  CV:  No chest pain, no orthopnea, no PND  GI:  no melena, no hematochezia, no diarhea, no constipation.  :  no dysuria, no hematuria, no hesistancy, no dribbling  Neuro:  no syncope, no vertigo, no tinitus  Psych:  No homocide or suicide ideation; no depression.  Endocrine:  No heat or cold intolerance.  Sleep:  No snoring; no witnessed apnea.  Otherwise, a balance of systems reviewed is negative.                 PHYSICAL EXAM:  There were no vitals filed for this visit.  There is no height or weight on file to calculate BMI.     n/a    LABS  Pulmonary Functions Testing Results:  1/31/13 tlc 93%; dlco 46%  4/8/14 ratio 67%; fvc 76%; fve 73%  6/14/19 ratio of 48%; fvc 85%; fev1 55%  5/15/20 ratio of 54%; fev1 57%; fvc 80%; tlc 77%; dlco 34%  6 min 5/15/20 165 96-89-91 on room     ABG: none  CXR:   Lungs clear  CT CHEST:  2/15/19 rll ggo and tib in lingula.    5/4/19 (personally reviewed) increased reticulation at bases; similar to prior ct 2/15/19; +emphysematous changes.      PPD none          Split night study from  3/21/2017: AHI 31.7, Effective control acieved with cpap 14 cm H2O  CPAP titration study 8/17/2019:  Effective control of sleep disordered breathing was achieved with BPAP at 16/10 cm of water.     2-d echo 12/30/11  1.  The right atrium and right ventricle are of normal dimension.            2.  Tricuspid regurgitation is mild.                                         3.  PA pressure 28-30 mmHg.                                                  4.  Enlargement of the left atrium.                                          5.  Normal mitral valve structure and valve excursion.  No mitral            stenosis.                                                                     No MR detected from this study.                                             6.  Normal left ventricular dimensions and systolic function, ejection       fraction 50%-55%.                                                             7.  Grade I left ventricular diastolic dysfunction.                          8.  Mild aortic sclerosis with good valve excursion.  No aortic stenosis.    Minimal aortic regurgitation.                                                9.  No pericardial effusion seen.      bnp 8/5/10 134;1/24/13 164    6 min walk 350 meters; no significant desaturation    ASSESSMENT  Problem List Items Addressed This Visit     COPD with chronic bronchitis and emphysema    Overview     fev1 55% 5/20. Patient also refuse oxygen. No obvious evidence at this time for need oxygen.  Quit smoking since 1960s.  Continue with trelegy and ventolin.  Patient is up to date with vaccination.           ILD (interstitial lung disease)    Overview     Emphysematous changes on ct along with basilar reticulation.  pft with moderate obstructive physiology and mild restrictive physiology.           Current Assessment & Plan     pft stable.                Goals of care - patient does not wish for intubation or cpr in the case of cardiopulmonary arrest.  Living will in [facility].  Daughter is poa.      Patient will No follow-ups on file.       This service was not originating from a related E/M service provided within the previous 7 days nor will  to an E/M service or procedure within the next 24 hours or my soonest available appointment.  Prevailing standard of care was able to be met in this audio-only visit.

## 2020-09-04 ENCOUNTER — PATIENT OUTREACH (OUTPATIENT)
Dept: ADMINISTRATIVE | Facility: HOSPITAL | Age: 80
End: 2020-09-04

## 2020-09-04 RX ORDER — LOSARTAN POTASSIUM 100 MG/1
100 TABLET ORAL
COMMUNITY
Start: 2020-06-15 | End: 2020-11-06 | Stop reason: SDUPTHER

## 2020-09-04 RX ORDER — CLOPIDOGREL BISULFATE 75 MG/1
75 TABLET ORAL
COMMUNITY
Start: 2020-05-27 | End: 2020-11-06 | Stop reason: SDUPTHER

## 2020-09-04 RX ORDER — ISOSORBIDE MONONITRATE 30 MG/1
30 TABLET, EXTENDED RELEASE ORAL
COMMUNITY
Start: 2020-06-15 | End: 2020-11-06 | Stop reason: SDUPTHER

## 2020-09-04 RX ORDER — ISOSORBIDE MONONITRATE 30 MG/1
TABLET, EXTENDED RELEASE ORAL
COMMUNITY
Start: 2020-06-15 | End: 2020-11-06 | Stop reason: SDUPTHER

## 2020-09-04 RX ORDER — METFORMIN HYDROCHLORIDE 500 MG/1
500 TABLET ORAL 2 TIMES DAILY WITH MEALS
COMMUNITY
Start: 2020-06-13 | End: 2021-05-13

## 2020-09-04 RX ORDER — SPIRONOLACTONE 25 MG/1
25 TABLET ORAL
COMMUNITY
Start: 2020-05-27 | End: 2020-11-06 | Stop reason: SDUPTHER

## 2020-09-04 RX ORDER — FUROSEMIDE 40 MG/1
40 TABLET ORAL
COMMUNITY
Start: 2020-05-27 | End: 2020-11-06 | Stop reason: SDUPTHER

## 2020-09-04 RX ORDER — CARVEDILOL 25 MG/1
TABLET ORAL
COMMUNITY
Start: 2020-06-15 | End: 2020-11-06 | Stop reason: SDUPTHER

## 2020-09-04 RX ORDER — CARVEDILOL 25 MG/1
25 TABLET ORAL
COMMUNITY
Start: 2020-06-15 | End: 2020-11-06 | Stop reason: SDUPTHER

## 2020-09-05 DIAGNOSIS — F33.0 MAJOR DEPRESSIVE DISORDER, RECURRENT EPISODE, MILD: ICD-10-CM

## 2020-09-07 RX ORDER — DULOXETIN HYDROCHLORIDE 30 MG/1
CAPSULE, DELAYED RELEASE ORAL
Qty: 90 CAPSULE | Refills: 0 | Status: SHIPPED | OUTPATIENT
Start: 2020-09-07 | End: 2020-11-26

## 2020-09-08 ENCOUNTER — TELEPHONE (OUTPATIENT)
Dept: FAMILY MEDICINE | Facility: CLINIC | Age: 80
End: 2020-09-08

## 2020-09-08 NOTE — TELEPHONE ENCOUNTER
Spoke with patient scheduled patient 9/10/20 at 9:00 AM with MICHAEL Levin. Patient verbalized understanding.

## 2020-09-08 NOTE — TELEPHONE ENCOUNTER
----- Message from Kasie Edmondson MD sent at 9/4/2020  1:17 PM CDT -----  Please have patient come back to see Poonam to discuss his diabetes control.  His control has worsened significantly.

## 2020-09-10 ENCOUNTER — OFFICE VISIT (OUTPATIENT)
Dept: FAMILY MEDICINE | Facility: CLINIC | Age: 80
End: 2020-09-10
Payer: MEDICARE

## 2020-09-10 VITALS
OXYGEN SATURATION: 95 % | SYSTOLIC BLOOD PRESSURE: 144 MMHG | WEIGHT: 164.44 LBS | HEIGHT: 68 IN | TEMPERATURE: 98 F | BODY MASS INDEX: 24.92 KG/M2 | HEART RATE: 74 BPM | DIASTOLIC BLOOD PRESSURE: 70 MMHG

## 2020-09-10 DIAGNOSIS — Z79.4 INSULIN LONG-TERM USE: ICD-10-CM

## 2020-09-10 DIAGNOSIS — I20.89 CHRONIC STABLE ANGINA: ICD-10-CM

## 2020-09-10 DIAGNOSIS — M62.838 NECK MUSCLE SPASM: Primary | ICD-10-CM

## 2020-09-10 DIAGNOSIS — I50.22 CHRONIC SYSTOLIC HEART FAILURE: ICD-10-CM

## 2020-09-10 DIAGNOSIS — J96.11 CHRONIC RESPIRATORY FAILURE WITH HYPOXIA, ON HOME O2 THERAPY: ICD-10-CM

## 2020-09-10 DIAGNOSIS — I10 ESSENTIAL HYPERTENSION: ICD-10-CM

## 2020-09-10 DIAGNOSIS — I70.0 ATHEROSCLEROSIS OF ABDOMINAL AORTA: ICD-10-CM

## 2020-09-10 DIAGNOSIS — Z99.81 CHRONIC RESPIRATORY FAILURE WITH HYPOXIA, ON HOME O2 THERAPY: ICD-10-CM

## 2020-09-10 DIAGNOSIS — I77.9 BILATERAL CAROTID ARTERY DISEASE, UNSPECIFIED TYPE: ICD-10-CM

## 2020-09-10 DIAGNOSIS — J84.9 ILD (INTERSTITIAL LUNG DISEASE): ICD-10-CM

## 2020-09-10 DIAGNOSIS — G47.33 OBSTRUCTIVE SLEEP APNEA TREATED WITH BIPAP: ICD-10-CM

## 2020-09-10 DIAGNOSIS — I48.0 PAROXYSMAL ATRIAL FIBRILLATION: ICD-10-CM

## 2020-09-10 DIAGNOSIS — D69.2 SENILE PURPURA: ICD-10-CM

## 2020-09-10 LAB
LEFT EYE DM RETINOPATHY: POSITIVE
RIGHT EYE DM RETINOPATHY: POSITIVE

## 2020-09-10 PROCEDURE — 99499 UNLISTED E&M SERVICE: CPT | Mod: S$GLB,,, | Performed by: NURSE PRACTITIONER

## 2020-09-10 PROCEDURE — 99999 PR PBB SHADOW E&M-EST. PATIENT-LVL V: ICD-10-PCS | Mod: PBBFAC,,, | Performed by: NURSE PRACTITIONER

## 2020-09-10 PROCEDURE — 3078F DIAST BP <80 MM HG: CPT | Mod: CPTII,S$GLB,, | Performed by: NURSE PRACTITIONER

## 2020-09-10 PROCEDURE — 3078F PR MOST RECENT DIASTOLIC BLOOD PRESSURE < 80 MM HG: ICD-10-PCS | Mod: CPTII,S$GLB,, | Performed by: NURSE PRACTITIONER

## 2020-09-10 PROCEDURE — 99214 PR OFFICE/OUTPT VISIT, EST, LEVL IV, 30-39 MIN: ICD-10-PCS | Mod: S$GLB,,, | Performed by: NURSE PRACTITIONER

## 2020-09-10 PROCEDURE — 1159F MED LIST DOCD IN RCRD: CPT | Mod: S$GLB,,, | Performed by: NURSE PRACTITIONER

## 2020-09-10 PROCEDURE — 3052F PR MOST RECENT HEMOGLOBIN A1C LEVEL 8.0 - < 9.0%: ICD-10-PCS | Mod: CPTII,S$GLB,, | Performed by: NURSE PRACTITIONER

## 2020-09-10 PROCEDURE — 1159F PR MEDICATION LIST DOCUMENTED IN MEDICAL RECORD: ICD-10-PCS | Mod: S$GLB,,, | Performed by: NURSE PRACTITIONER

## 2020-09-10 PROCEDURE — 3077F SYST BP >= 140 MM HG: CPT | Mod: CPTII,S$GLB,, | Performed by: NURSE PRACTITIONER

## 2020-09-10 PROCEDURE — 3052F HG A1C>EQUAL 8.0%<EQUAL 9.0%: CPT | Mod: CPTII,S$GLB,, | Performed by: NURSE PRACTITIONER

## 2020-09-10 PROCEDURE — 3077F PR MOST RECENT SYSTOLIC BLOOD PRESSURE >= 140 MM HG: ICD-10-PCS | Mod: CPTII,S$GLB,, | Performed by: NURSE PRACTITIONER

## 2020-09-10 PROCEDURE — 1101F PT FALLS ASSESS-DOCD LE1/YR: CPT | Mod: CPTII,S$GLB,, | Performed by: NURSE PRACTITIONER

## 2020-09-10 PROCEDURE — 1126F PR PAIN SEVERITY QUANTIFIED, NO PAIN PRESENT: ICD-10-PCS | Mod: S$GLB,,, | Performed by: NURSE PRACTITIONER

## 2020-09-10 PROCEDURE — 1126F AMNT PAIN NOTED NONE PRSNT: CPT | Mod: S$GLB,,, | Performed by: NURSE PRACTITIONER

## 2020-09-10 PROCEDURE — 99499 RISK ADDL DX/OHS AUDIT: ICD-10-PCS | Mod: S$GLB,,, | Performed by: NURSE PRACTITIONER

## 2020-09-10 PROCEDURE — 99999 PR PBB SHADOW E&M-EST. PATIENT-LVL V: CPT | Mod: PBBFAC,,, | Performed by: NURSE PRACTITIONER

## 2020-09-10 PROCEDURE — 1101F PR PT FALLS ASSESS DOC 0-1 FALLS W/OUT INJ PAST YR: ICD-10-PCS | Mod: CPTII,S$GLB,, | Performed by: NURSE PRACTITIONER

## 2020-09-10 PROCEDURE — 99214 OFFICE O/P EST MOD 30 MIN: CPT | Mod: S$GLB,,, | Performed by: NURSE PRACTITIONER

## 2020-09-10 RX ORDER — TIZANIDINE 4 MG/1
4 TABLET ORAL NIGHTLY PRN
Qty: 30 TABLET | Refills: 0 | Status: SHIPPED | OUTPATIENT
Start: 2020-09-10 | End: 2020-10-10

## 2020-09-10 NOTE — PROGRESS NOTES
Subjective:       Patient ID: Percy Ramirez is a 79 y.o. male.    Chief Complaint: Diabetes (F/U)    79-year-old male presents to the clinic today complaint of tightness to both sides of his neck.  He said it is worse when he turns his head a certain way. He denies any known trauma to his neck.  His hemoglobin A1c from 10/08/2020 was 8.7.  He states his blood sugar this morning was 260.  He states his blood sugar has been running anywhere from 165-280.  He has chronic blurred vision.  He denies any headaches or dizziness.  He denies any cardiac chest pain, heart palpitations, shortness of breath, or swelling to lower extremities. He has lab work scheduled 10/30/2020 with a follow up appt with Dr. Edmondson on 11/6/2020.     Past Medical History:   Diagnosis Date    Allergy     Arthritis     CAD, multiple vessel     DR Melissa    Cataract     Depression     Hyperlipidemia     Hypertension     Macular degeneration     Dr Razo for injection, Jennifer for eye    S/P CABG x 2     Type 2 diabetes mellitus with circulatory disorder     Dr. Aquino     Past Surgical History:   Procedure Laterality Date    broken elbow      left    COLONOSCOPY N/A 10/4/2017    Procedure: COLONOSCOPY;  Surgeon: Gabriel Leung MD;  Location: [facility];  Service: Endoscopy;  Laterality: N/A;    EYE SURGERY      cataract    heart bypass      2004    HERNIA REPAIR      right    ROTATOR CUFF REPAIR      right  2004      reports that he quit smoking about 16 years ago. His smoking use included cigarettes. He has a 135.00 pack-year smoking history. He has never used smokeless tobacco. He reports current alcohol use. He reports that he does not use drugs.  Review of Systems   Constitutional: Negative for activity change and fatigue.   Respiratory: Negative for cough, chest tightness, shortness of breath and wheezing.    Cardiovascular: Negative for chest pain, palpitations and leg swelling.   Gastrointestinal: Negative for  abdominal pain, diarrhea, nausea and vomiting.   Musculoskeletal: Negative for back pain and gait problem.        Muscle spasms both sides of neck    Skin: Negative for color change.   Neurological: Negative for dizziness, light-headedness and headaches.       Objective:      Physical Exam  Constitutional:       General: He is not in acute distress.     Appearance: Normal appearance. He is obese. He is not ill-appearing, toxic-appearing or diaphoretic.   Neck:      Musculoskeletal: Normal range of motion and neck supple.      Comments: c-spine non-tender to palpation tender bilateral paracervical muscles and when turning head from side to side   Cardiovascular:      Rate and Rhythm: Normal rate and regular rhythm.      Heart sounds: Normal heart sounds. No murmur.   Pulmonary:      Effort: Pulmonary effort is normal.      Breath sounds: Normal breath sounds. No wheezing or rhonchi.   Abdominal:      General: Bowel sounds are normal.      Palpations: Abdomen is soft.      Tenderness: There is no abdominal tenderness.   Musculoskeletal: Normal range of motion.         General: No swelling.   Skin:     General: Skin is warm and dry.   Neurological:      Mental Status: He is alert and oriented to person, place, and time.   Psychiatric:         Mood and Affect: Mood normal.         Behavior: Behavior normal.         Thought Content: Thought content normal.         Judgment: Judgment normal.         Assessment:       1. Neck muscle spasm    2. Essential hypertension    3. Type 2 diabetes, uncontrolled, with retinopathy    4. Atherosclerosis of abdominal aorta    5. Obstructive sleep apnea treated with BiPAP    6. Chronic stable angina    7. ILD (interstitial lung disease)    8. Type 2 diabetes, uncontrolled, with neuropathy    9. Type 2 diabetes, uncontrolled, with peripheral circulatory disorder    10. Insulin long-term use    11. Paroxysmal atrial fibrillation    12. Chronic systolic heart failure    13. Senile purpura     14. Bilateral carotid artery disease, unspecified type    15. Chronic respiratory failure with hypoxia, on home O2 therapy        Plan:         Neck muscle spasm  -     tiZANidine (ZANAFLEX) 4 MG tablet; Take 1 tablet (4 mg total) by mouth nightly as needed (muscle spasm).  Dispense: 30 tablet; Refill: 0    Essential hypertension  - The current medical regimen is effective;  continue present plan and medications.    Type 2 diabetes, uncontrolled, with retinopathy  - Increase NPH 10 units twice a day with meals     Atherosclerosis of abdominal aorta  - Stable / Asymptomatic is on blood pressure and cholesterol lowering medications    Obstructive sleep apnea treated with BiPAP  - uses CPAP and BiPAP every night and when takes naps     Chronic stable angina  - The current medical regimen is effective;  continue present plan and medications.    ILD (interstitial lung disease)  - The current medical regimen is effective;  continue present plan and medications.    Type 2 diabetes, uncontrolled, with neuropathy  - The current medical regimen is effective;  continue present plan and medications.    Type 2 diabetes, uncontrolled, with peripheral circulatory disorder  - The current medical regimen is effective;  continue present plan and medications.    Insulin long-term use  - monitor blood sugars closely    Paroxysmal atrial fibrillation  - on Coumadin     Chronic systolic heart failure  - The current medical regimen is effective;  continue present plan and medications.    Senile purpura  - stable, observe    Bilateral carotid artery disease, unspecified type  - stable, continue to monitor       Follow up in about 8 weeks (around 11/6/2020) for with Dr. Edmondson.

## 2020-09-10 NOTE — PATIENT INSTRUCTIONS
Increase NPH insulin to 10 units twice a day with meals   Continue Amaryl and Metformin   Zanaflex 4 mg every night as needed for muscle spasms in the neck   Follow up with Dr. Edmondson 11/6/2020   Be sure to get Lab work on 10/30/2020 as scheduled

## 2020-09-21 ENCOUNTER — OFFICE VISIT (OUTPATIENT)
Dept: URGENT CARE | Facility: CLINIC | Age: 80
End: 2020-09-21
Payer: MEDICARE

## 2020-09-21 VITALS
OXYGEN SATURATION: 93 % | RESPIRATION RATE: 16 BRPM | HEART RATE: 100 BPM | SYSTOLIC BLOOD PRESSURE: 170 MMHG | TEMPERATURE: 99 F | DIASTOLIC BLOOD PRESSURE: 90 MMHG

## 2020-09-21 DIAGNOSIS — M54.41 ACUTE RIGHT-SIDED LOW BACK PAIN WITH RIGHT-SIDED SCIATICA: Primary | ICD-10-CM

## 2020-09-21 DIAGNOSIS — M25.551 RIGHT HIP PAIN: ICD-10-CM

## 2020-09-21 LAB
BILIRUB UR QL STRIP: NEGATIVE
GLUCOSE UR QL STRIP: POSITIVE
KETONES UR QL STRIP: NEGATIVE
LEUKOCYTE ESTERASE UR QL STRIP: NEGATIVE
PH, POC UA: 8.5 (ref 5–8)
POC BLOOD, URINE: NEGATIVE
POC NITRATES, URINE: NEGATIVE
PROT UR QL STRIP: POSITIVE
SP GR UR STRIP: 1.01 (ref 1–1.03)
UROBILINOGEN UR STRIP-ACNC: 1 (ref 0.3–2.2)

## 2020-09-21 PROCEDURE — 81003 POCT URINALYSIS, DIPSTICK, AUTOMATED, W/O SCOPE: ICD-10-PCS | Mod: QW,S$GLB,, | Performed by: PHYSICIAN ASSISTANT

## 2020-09-21 PROCEDURE — 99214 OFFICE O/P EST MOD 30 MIN: CPT | Mod: 25,S$GLB,, | Performed by: PHYSICIAN ASSISTANT

## 2020-09-21 PROCEDURE — 81003 URINALYSIS AUTO W/O SCOPE: CPT | Mod: QW,S$GLB,, | Performed by: PHYSICIAN ASSISTANT

## 2020-09-21 PROCEDURE — 73502 X-RAY EXAM HIP UNI 2-3 VIEWS: CPT | Mod: RT,S$GLB,, | Performed by: RADIOLOGY

## 2020-09-21 PROCEDURE — 99214 PR OFFICE/OUTPT VISIT, EST, LEVL IV, 30-39 MIN: ICD-10-PCS | Mod: 25,S$GLB,, | Performed by: PHYSICIAN ASSISTANT

## 2020-09-21 PROCEDURE — 73502 XR HIP 2 VIEW RIGHT: ICD-10-PCS | Mod: RT,S$GLB,, | Performed by: RADIOLOGY

## 2020-09-21 RX ORDER — DICLOFENAC SODIUM 10 MG/G
2 GEL TOPICAL 2 TIMES DAILY
Qty: 100 G | Refills: 0 | Status: SHIPPED | OUTPATIENT
Start: 2020-09-21

## 2020-09-21 NOTE — PROGRESS NOTES
"Subjective:       Patient ID: Percy Ramirez is a 79 y.o. male.    Vitals:  temperature is 98.7 °F (37.1 °C). His blood pressure is 170/90 (abnormal) and his pulse is 100. His respiration is 16 and oxygen saturation is 93% (abnormal).     Chief Complaint: Back Pain    Mr. Ramirez is is a 78yo male with a PMHx of DJD, chronic lower back pain, MDD, HTN, CAD (s/o CABG x 2), HF, A fib, macular degeneration, DM II (uncontrolled), and COPD with chronic respiratory failure and hypoxia (on home oxygen) who presents to the urgent care for evaluation with c/o right sided lower back pain "off to the side" that "shoots down" the right leg. States pain is worse with position. States he is concerned his "hip may be out of alignment." States pain started 2 days ago. He denies injury/truama to the area. He does endorse tingling to feet but states this is baseline 2/2 to neuropathy. Denies saddle anesthesia, bowel/bladder incontinence.   He does report being seen by PCP 10 days ago for muscle spasms in neck and was prescribed 4mg tizanidine nightly for symptoms, but states he took one dose and felt "out of it" so he never took it again. Denies injury/falls/dizziness/or LOC.     Back Pain  This is a new problem. The current episode started in the past 7 days. The problem has been gradually worsening since onset. The pain is present in the lumbar spine. The quality of the pain is described as shooting. The pain radiates to the right thigh. The pain is at a severity of 8/10. The pain is moderate. The pain is the same all the time. The symptoms are aggravated by twisting and position. Stiffness is present all day. Pertinent negatives include no abdominal pain, bladder incontinence, bowel incontinence, chest pain, dysuria, fever, headaches, leg pain, numbness, paresis, paresthesias, pelvic pain, perianal numbness, tingling, weakness or weight loss.       Constitution: Negative for chills, sweating, fatigue and fever.   HENT: Negative for " ear pain, congestion and sore throat.    Neck: Negative for painful lymph nodes.   Cardiovascular: Negative for chest pain, leg swelling and palpitations.   Eyes: Negative for double vision and blurred vision.   Respiratory: Negative for chest tightness, cough and shortness of breath.    Gastrointestinal: Negative for abdominal pain, nausea, vomiting, diarrhea and bowel incontinence.   Genitourinary: Negative for dysuria, frequency, urgency, bladder incontinence and pelvic pain.   Musculoskeletal: Positive for back pain and pain with walking. Negative for joint pain, joint swelling, muscle cramps and muscle ache.   Skin: Negative for color change, pale and rash.   Allergic/Immunologic: Negative for seasonal allergies.   Neurological: Negative for dizziness, history of vertigo, light-headedness, passing out, headaches and numbness.   Hematologic/Lymphatic: Negative for swollen lymph nodes, easy bruising/bleeding and history of blood clots. Does not bruise/bleed easily.   Psychiatric/Behavioral: Negative for nervous/anxious, sleep disturbance and depression. The patient is not nervous/anxious.        Objective:      Physical Exam   Constitutional: He is oriented to person, place, and time. He appears well-developed. He is cooperative.  Non-toxic appearance. He does not appear ill. No distress.      Comments:Patient is sitting pleasantly on exam table in no acute distress. Nontoxic appearing.    HENT:   Head: Normocephalic and atraumatic.   Nose: Nose normal.   Mouth/Throat: Oropharynx is clear and moist and mucous membranes are normal.   Eyes: Pupils are equal, round, and reactive to light. Conjunctivae and lids are normal.   Neck: Trachea normal, normal range of motion, full passive range of motion without pain and phonation normal. Neck supple.   Cardiovascular: Normal rate, regular rhythm, normal heart sounds and normal pulses.   Pulmonary/Chest: Effort normal and breath sounds normal. No stridor. No respiratory  distress. He has no wheezes. He has no rhonchi. He has no rales.    Comments: No evidence of respiratory distress; able to speak in complete sentences; o2 sat 93% on RA.  Patient reports chronic hypoxia, which is confirmed by review of medical records; he is on home oxygen, but did not bring oxygen with him in clinic    Abdominal: Soft. Normal appearance and bowel sounds are normal. He exhibits no abdominal bruit, no pulsatile midline mass and no mass.   Musculoskeletal:         General: No deformity.      Right hip: Normal.      Lumbar back: He exhibits tenderness, pain and spasm. He exhibits normal range of motion, no bony tenderness, no swelling, no edema, no deformity, no laceration and normal pulse.        Back:       Comments: Ambulates with cane; Strength intact and symmetrical throughout extremities.    Neurological: He is alert and oriented to person, place, and time. He has normal strength and normal reflexes. No sensory deficit.   Skin: Skin is warm, dry, intact and not diaphoretic. Psychiatric: His speech is normal and behavior is normal. Mood, judgment and thought content normal.   Nursing note and vitals reviewed.        Results for orders placed or performed in visit on 09/21/20   POCT Urinalysis, Dipstick, Automated, W/O Scope   Result Value Ref Range    POC Blood, Urine Negative Negative    POC Bilirubin, Urine Negative Negative    POC Urobilinogen, Urine 1 0.3 - 2.2    POC Ketones, Urine Negative Negative    POC Protein, Urine Positive (A) Negative    POC Nitrates, Urine Negative Negative    POC Glucose, Urine Positive (A) Negative    pH, UA 8.5 (A) 5 - 8    POC Specific Gravity, Urine 1.010 1.003 - 1.029    POC Leukocytes, Urine Negative Negative     Clinically well appearing, UA shows no evidence of infection. Hip xray completed per patient requesting. No fracture or dislocation or evidence of AVN noted. Advised symptoms are likely 2/2 to sciatic nerve pain. Due to uncontrolled DM (recent A1C  8.7), and no recent labs to check kidney function (CMP from 2018 showed GFR 54 and elevated Cr) will refrain from using steroids or PO NSAIDs at this time. Advised to take 1/2 of previously prescribed zanaflex, and will given topical voltaren.   Advised on elevated BP reading on today's visit. States he did not take evening BP meds prior to arrival. Stressed importance of close follow up with PCP.   Advised on return/follow-up precautions. Advised on ER precautions. Answered all patient questions. Patient verbalized understanding and voiced agreement with current treatment plan.    Assessment:       1. Acute right-sided low back pain with right-sided sciatica    2. Right hip pain        Plan:         Acute right-sided low back pain with right-sided sciatica  -     POCT Urinalysis, Dipstick, Automated, W/O Scope  -     diclofenac sodium (VOLTAREN) 1 % Gel; Apply 2 g topically 2 (two) times daily.  Dispense: 100 g; Refill: 0    Right hip pain  -     X-Ray Hip 2 or 3 views Right; Future; Expected date: 09/21/2020  -     diclofenac sodium (VOLTAREN) 1 % Gel; Apply 2 g topically 2 (two) times daily.  Dispense: 100 g; Refill: 0      Patient Instructions       Alternate heat and ice for first 48 hours then  apply heat. You may do gently stretching if tolerable.    Moist warm compresss to area several times daily.  May use a heating pad on LOW to provide heat over a towel which was dampended with warm water. DO NOT FALL ASLEEP WITH HEATING PAD ON.  Do not stay in one position to long.  When sleeping on your back place a pillow under knees to reduce tension on back.  If sleeping on your side, place pillow between knees to keep spine in better alinement.  Wear supportive shoes such as tennis shoes for support of the lower back.       You can take the Muscle Relaxant (tizanidine) nightly as previously prescribed, but I want you to cut the pills in half and only take 1/2 a pill per night. This may help reduce the side effects  you are experiencing as we have discussed.  The medication you have been prescribed may cause drowsiness and impair your judgement.  Therefore, you should avoid driving, climbing, using machinery, etc., so as not to increase your risk of injury.  Do NOT drink any alcohol while on this medication(s).       You can use topical gel as directed as needed for pain and inflammation.    Follow up with your PCP in the next 3-5 days or sooner if no improvement.      If you lose control of your bowel and/or bladder, please go to the nearest Emergency Department immediately.  If you lose sensation in between your legs by your genitalia and/or rectum, please go to the nearest Emergency Department immediately.  If you lose control or sensation of any extremity, please go to the nearest Emergency Department immediately.    If your condition worsens or fails to improve we recommend that you receive another evaluation at the ER immediately or contact your PCP to discuss your concerns or return here. You must understand that you've received an urgent care treatment only and that you may be released before all your medical problems are known or treated. You the patient will arrange for followup care as instructed.             Sciatica    Sciatica is a condition that causes pain in the lower back that spreads down into the buttock, hip, and leg. Sometimes the leg pain can happen without any back pain. Sciatica happens when a spinal nerve is irritated or has pressure put on it as comes out of the spinal canal in the lower back. This most often happens when a bulge or rupture of a nearby spinal disk presses on the nerve. Sciatica can also be caused by a narrowing of the spinal canal (spinal stenosis) or spasm of the muscle in the buttocks that the sciatic nerve passes through (pyriform muscle). Sciatica is also called lumbar radiculopathy.  Sciatica may begin after a sudden twisting or bending force, such as in a car accident. Or it can  happen after a simple awkward movement. In either case, muscle spasm often also happens. Muscle spasm makes the pain worse.  A healthcare provider makes a diagnosis of sciatica from your symptoms and a physical exam. Unless you had an injury from a car accident or fall, you usually wont have X-rays taken at this time. This is because the nerves and disks in your back cant be seen on an X-ray. If the provider sees signs of a compressed nerve, you will need to schedule an MRI scan as an outpatient. Signs of a compressed nerve include loss of strength in a leg.  Most sciatica gets better with medicine, exercise, and physical therapy. If your symptoms continue after at least 3 months of medical treatment, you may need surgery or injections to your lower back.  Home care  Follow these tips when caring for yourself at home:  · You may need to stay in bed the first few days. But as soon as possible, begin sitting up or walking. This will help you avoid problems that come from staying in bed for long periods.  · When in bed, try to find a position that is comfortable. A firm mattress is best. Try lying flat on your back with pillows under your knees. You can also try lying on your side with your knees bent up toward your chest and a pillow between your knees.  · Avoid sitting for long periods. This puts more stress on your lower back than standing or walking.  · Use heat from a hot shower, hot bath, or heating pad to help ease pain. Massage can also help. You can also try using an ice pack. You can make your own ice pack by putting ice cubes in a plastic bag. Wrap the bag in a thin towel. Try both heat and cold to see which works best. Use the method that feels best for 20 minutes several times a day.  · You may use acetaminophen or ibuprofen to ease pain, unless another pain medicine was prescribed. Note: If you have chronic liver or kidney disease, talk with your healthcare provider before taking these medicines. Also  talk with your provider if youve had a stomach ulcer or gastrointestinal bleeding.  · Use safe lifting methods. Dont lift anything heavier than 15 pounds until all of the pain is gone.  Follow-up care  Follow up with your healthcare provider, or as advised. You may need physical therapy or additional tests.  If X-rays were taken, a radiologist will look at them. You will be told of any new findings that may affect your care.  When to seek medical advice  Call your healthcare provider right away if any of these occur:  · Pain gets worse even after taking prescribed medicine  · Weakness or numbness in 1 or both legs or hips  · Numbness in your groin or genital area  · You cant control your bowel or bladder  · Fever  · Redness or swelling over your back or spine   Date Last Reviewed: 8/1/2016  © 2860-1568 [email or web address]. [address] [place] [address], [place] [address]. All rights reserved. This information is not intended as a substitute for professional medical care. Always follow your healthcare professional's instructions.

## 2020-09-22 ENCOUNTER — TELEPHONE (OUTPATIENT)
Dept: FAMILY MEDICINE | Facility: CLINIC | Age: 80
End: 2020-09-22

## 2020-09-22 NOTE — TELEPHONE ENCOUNTER
Spoke with the patient and inform him the Voltaren gel was send to [facility].   He was seen at Urgent care last night and they took xray's and gave him Voltaren gel for the pain.  Patient is taking Zanaflex 4 mg.  Patient states the pain started on Friday.  His pain level is a 4 now.  He wants to know do  know what's wrong with his back?  Please advise  Patient haven't  the Voltaren gel yet.

## 2020-09-22 NOTE — TELEPHONE ENCOUNTER
Looks like urgent care took X-rays of his hip (not back). Hip X-rays showed moderate arthritis.  We can consider referral to pain management for further evaluation, if desired.    Lvm please return staff call   With out details                                                                                    Spoke with the patient and he said to wait a couple of months and see what happens.    Patient verbalized understandings.   I told the patient where the Voltaren gel could be  from.  Patient verbalized understandings.

## 2020-09-22 NOTE — TELEPHONE ENCOUNTER
Looks like urgent care took X-rays of his hip (not back). Hip X-rays showed moderate arthritis.  We can consider referral to pain management for further evaluation, if desired.

## 2020-09-22 NOTE — PATIENT INSTRUCTIONS
Alternate heat and ice for first 48 hours then  apply heat. You may do gently stretching if tolerable.    Moist warm compresss to area several times daily.  May use a heating pad on LOW to provide heat over a towel which was dampended with warm water. DO NOT FALL ASLEEP WITH HEATING PAD ON.  Do not stay in one position to long.  When sleeping on your back place a pillow under knees to reduce tension on back.  If sleeping on your side, place pillow between knees to keep spine in better alinement.  Wear supportive shoes such as tennis shoes for support of the lower back.       You can take the Muscle Relaxant (tizanidine) nightly as previously prescribed, but I want you to cut the pills in half and only take 1/2 a pill per night. This may help reduce the side effects you are experiencing as we have discussed.  The medication you have been prescribed may cause drowsiness and impair your judgement.  Therefore, you should avoid driving, climbing, using machinery, etc., so as not to increase your risk of injury.  Do NOT drink any alcohol while on this medication(s).       You can use topical gel as directed as needed for pain and inflammation.    Follow up with your PCP in the next 3-5 days or sooner if no improvement.      If you lose control of your bowel and/or bladder, please go to the nearest Emergency Department immediately.  If you lose sensation in between your legs by your genitalia and/or rectum, please go to the nearest Emergency Department immediately.  If you lose control or sensation of any extremity, please go to the nearest Emergency Department immediately.    If your condition worsens or fails to improve we recommend that you receive another evaluation at the ER immediately or contact your PCP to discuss your concerns or return here. You must understand that you've received an urgent care treatment only and that you may be released before all your medical problems are known or treated. You the patient  will arrange for followup care as instructed.             Sciatica    Sciatica is a condition that causes pain in the lower back that spreads down into the buttock, hip, and leg. Sometimes the leg pain can happen without any back pain. Sciatica happens when a spinal nerve is irritated or has pressure put on it as comes out of the spinal canal in the lower back. This most often happens when a bulge or rupture of a nearby spinal disk presses on the nerve. Sciatica can also be caused by a narrowing of the spinal canal (spinal stenosis) or spasm of the muscle in the buttocks that the sciatic nerve passes through (pyriform muscle). Sciatica is also called lumbar radiculopathy.  Sciatica may begin after a sudden twisting or bending force, such as in a car accident. Or it can happen after a simple awkward movement. In either case, muscle spasm often also happens. Muscle spasm makes the pain worse.  A healthcare provider makes a diagnosis of sciatica from your symptoms and a physical exam. Unless you had an injury from a car accident or fall, you usually wont have X-rays taken at this time. This is because the nerves and disks in your back cant be seen on an X-ray. If the provider sees signs of a compressed nerve, you will need to schedule an MRI scan as an outpatient. Signs of a compressed nerve include loss of strength in a leg.  Most sciatica gets better with medicine, exercise, and physical therapy. If your symptoms continue after at least 3 months of medical treatment, you may need surgery or injections to your lower back.  Home care  Follow these tips when caring for yourself at home:  · You may need to stay in bed the first few days. But as soon as possible, begin sitting up or walking. This will help you avoid problems that come from staying in bed for long periods.  · When in bed, try to find a position that is comfortable. A firm mattress is best. Try lying flat on your back with pillows under your knees. You  can also try lying on your side with your knees bent up toward your chest and a pillow between your knees.  · Avoid sitting for long periods. This puts more stress on your lower back than standing or walking.  · Use heat from a hot shower, hot bath, or heating pad to help ease pain. Massage can also help. You can also try using an ice pack. You can make your own ice pack by putting ice cubes in a plastic bag. Wrap the bag in a thin towel. Try both heat and cold to see which works best. Use the method that feels best for 20 minutes several times a day.  · You may use acetaminophen or ibuprofen to ease pain, unless another pain medicine was prescribed. Note: If you have chronic liver or kidney disease, talk with your healthcare provider before taking these medicines. Also talk with your provider if youve had a stomach ulcer or gastrointestinal bleeding.  · Use safe lifting methods. Dont lift anything heavier than 15 pounds until all of the pain is gone.  Follow-up care  Follow up with your healthcare provider, or as advised. You may need physical therapy or additional tests.  If X-rays were taken, a radiologist will look at them. You will be told of any new findings that may affect your care.  When to seek medical advice  Call your healthcare provider right away if any of these occur:  · Pain gets worse even after taking prescribed medicine  · Weakness or numbness in 1 or both legs or hips  · Numbness in your groin or genital area  · You cant control your bowel or bladder  · Fever  · Redness or swelling over your back or spine   Date Last Reviewed: 8/1/2016  © 0806-8153 [employer], [place] [address] [place] [address], [place] [address]. All rights reserved. This information is not intended as a substitute for professional medical care. Always follow your healthcare professional's instructions.

## 2020-09-22 NOTE — TELEPHONE ENCOUNTER
----- Message from Brandon Julien sent at 9/22/2020  9:48 AM CDT -----  Regarding: call back  Type: Patient Call Back    Who called:Percy     What is the request in detail: The patient is returning a call back to the nurse of Dr. Edmondson.    Can the clinic reply by [facility]?no    Would the patient rather a call back or a response via [facility]? Call back     Best call back number: [phone]

## 2020-09-22 NOTE — TELEPHONE ENCOUNTER
----- Message from Mahsa Tapia sent at 9/21/2020  4:19 PM CDT -----  Contact: Self [phone]  Type: Patient Call Back    Who called: Self     What is the request in detail: pt is calling to speak with someone because he is having severe back pain    Can the clinic reply by [facility]? Call back    Would the patient rather a call back or a response via [facility]? Call back    Best call back number: [phone]

## 2020-09-30 ENCOUNTER — TELEPHONE (OUTPATIENT)
Dept: FAMILY MEDICINE | Facility: CLINIC | Age: 80
End: 2020-09-30

## 2020-09-30 NOTE — TELEPHONE ENCOUNTER
----- Message from Eileen Jessica sent at 9/30/2020 12:26 PM CDT -----  Regarding: Self/  [phone]  Type: Patient Call Back    Who called:  Patient    What is the request in detail:  Patient would like staff to give him a call regarding his heart.  Thank you      Would the patient rather a call back or a response via [facility]?   Call back    Best call back number:  [phone] (home)

## 2020-09-30 NOTE — TELEPHONE ENCOUNTER
Spoke with patient and he said that he had a visit with his heart doctor and they told him that his heart is out of rhythm and he had the to decide if he wanted to be shocked are try medication. Patient said that he had had this done in the past and was hospitalized  10 days after but was told it was for a UTI. He's not sure. He said that he just wants someone to help him make the right decision. He said that his daughter not sure what he need to do as well. He said that he's scared and don't want to make the wrong decision as well. Advise him to contact his Cardiologist and schedule appointment with him and bring daughter as well so they can discuss this more clearly for him since they know his condition and that way he can make a decision on what would be the better out come for him.

## 2020-10-02 ENCOUNTER — PATIENT OUTREACH (OUTPATIENT)
Dept: ADMINISTRATIVE | Facility: HOSPITAL | Age: 80
End: 2020-10-02

## 2020-10-02 RX ORDER — INFLUENZA A VIRUS A/MICHIGAN/45/2015 X-275 (H1N1) ANTIGEN (FORMALDEHYDE INACTIVATED), INFLUENZA A VIRUS A/SINGAPORE/INFIMH-16-0019/2016 IVR-186 (H3N2) ANTIGEN (FORMALDEHYDE INACTIVATED), INFLUENZA B VIRUS B/PHUKET/3073/2013 ANTIGEN (FORMALDEHYDE INACTIVATED), AND INFLUENZA B VIRUS B/MARYLAND/15/2016 BX-69A ANTIGEN (FORMALDEHYDE INACTIVATED) 60; 60; 60; 60 UG/.7ML; UG/.7ML; UG/.7ML; UG/.7ML
INJECTION, SUSPENSION INTRAMUSCULAR
COMMUNITY
Start: 2020-09-23 | End: 2020-11-06

## 2020-10-02 NOTE — LETTER
AUTHORIZATION FOR RELEASE OF   CONFIDENTIAL INFORMATION    Dear Dr. Trever Arevalo,    We are seeing Percy Ramirez, date of birth 1940, in the clinic at [facility]/ [facility]/ PEDS. Kasie Edmondson MD is the patient's PCP. Percy Ramirez has an outstanding lab/procedure at the time we reviewed his chart. In order to help keep his health information updated, he has authorized us to request the following medical record(s):        (  )  MAMMOGRAM                                      (  )  COLONOSCOPY      (  )  PAP SMEAR                                          (  )  OUTSIDE LAB RESULTS     (  )  DEXA SCAN                                          ( x )  EYE EXAM(diabetic)             (  )  FOOT EXAM                                          (  )  ENTIRE RECORD     (  )  OUTSIDE IMMUNIZATIONS                 (  )  _______________         Please fax records to [facility], Laura A Nicosia, MD,  [phone].     If you have any questions, please contact Remedios Madison LPN [facility] at   [phone].       Patient Name: Percy Ramirez  : 1940  Patient Phone #: [phone]

## 2020-10-05 ENCOUNTER — OFFICE VISIT (OUTPATIENT)
Dept: PODIATRY | Facility: CLINIC | Age: 80
End: 2020-10-05
Payer: MEDICARE

## 2020-10-05 VITALS
WEIGHT: 164 LBS | SYSTOLIC BLOOD PRESSURE: 132 MMHG | BODY MASS INDEX: 24.86 KG/M2 | HEIGHT: 68 IN | DIASTOLIC BLOOD PRESSURE: 77 MMHG

## 2020-10-05 DIAGNOSIS — M20.42 HAMMER TOES OF BOTH FEET: ICD-10-CM

## 2020-10-05 DIAGNOSIS — R20.2 PARESTHESIA OF BOTH FEET: ICD-10-CM

## 2020-10-05 DIAGNOSIS — L84 CORN OR CALLUS: ICD-10-CM

## 2020-10-05 DIAGNOSIS — M20.41 HAMMER TOES OF BOTH FEET: ICD-10-CM

## 2020-10-05 DIAGNOSIS — M20.12 HALLUX ABDUCTO VALGUS, LEFT: ICD-10-CM

## 2020-10-05 DIAGNOSIS — B35.1 ONYCHOMYCOSIS DUE TO DERMATOPHYTE: ICD-10-CM

## 2020-10-05 DIAGNOSIS — M20.11 HALLUX ABDUCTO VALGUS, RIGHT: ICD-10-CM

## 2020-10-05 DIAGNOSIS — E11.49 TYPE II DIABETES MELLITUS WITH NEUROLOGICAL MANIFESTATIONS: ICD-10-CM

## 2020-10-05 PROCEDURE — 11056 PR TRIM BENIGN HYPERKERATOTIC SKIN LESION,2-4: ICD-10-PCS | Mod: Q9,S$GLB,, | Performed by: PODIATRIST

## 2020-10-05 PROCEDURE — 11721 PR DEBRIDEMENT OF NAILS, 6 OR MORE: ICD-10-PCS | Mod: 59,Q9,S$GLB, | Performed by: PODIATRIST

## 2020-10-05 PROCEDURE — 99499 UNLISTED E&M SERVICE: CPT | Mod: S$GLB,,, | Performed by: PODIATRIST

## 2020-10-05 PROCEDURE — 11721 DEBRIDE NAIL 6 OR MORE: CPT | Mod: 59,Q9,S$GLB, | Performed by: PODIATRIST

## 2020-10-05 PROCEDURE — 99999 PR PBB SHADOW E&M-EST. PATIENT-LVL II: CPT | Mod: PBBFAC,,, | Performed by: PODIATRIST

## 2020-10-05 PROCEDURE — 99999 PR PBB SHADOW E&M-EST. PATIENT-LVL II: ICD-10-PCS | Mod: PBBFAC,,, | Performed by: PODIATRIST

## 2020-10-05 PROCEDURE — 99499 NO LOS: ICD-10-PCS | Mod: S$GLB,,, | Performed by: PODIATRIST

## 2020-10-05 PROCEDURE — 11056 PARNG/CUTG B9 HYPRKR LES 2-4: CPT | Mod: Q9,S$GLB,, | Performed by: PODIATRIST

## 2020-10-05 NOTE — PROGRESS NOTES
Subjective:      Patient ID: Percy Ramirez is a 79 y.o. male.    Chief Complaint: Diabetes Mellitus (PC[facility]  [facility])), Nail Care, and Nail Problem (Bilateral Toe pain )    Percy is a 79 y.o. male who presents to the clinic for evaluation and treatment of high risk feet. Percy has a past medical history of Allergy, Arthritis, CAD, multiple vessel, Cataract, Depression, Hyperlipidemia, Hypertension, Macular degeneration, S/P CABG x 2, and Type 2 diabetes mellitus with circulatory disorder. The patient's chief complaint is elongated, thickened toenails aggravated by increased weight bearing, shoe gear, pressure.    Patient admits to barefoot walking often in and around home    Patient complains of numbness, tingling, burning to the feet not currently well controlled with gabapentin or Cymbalta. He relates that gabapentin makes him very drowsy    Patient relates cat scratch and a cot to the left hand.     This patient has documented high risk feet requiring routine maintenance secondary to diabetes mellitis and those secondary complications of diabetes, as mentioned..    PCP: Kasie Edmondson MD    Date Last Seen by PCP:   Chief Complaint   Patient presents with    Diabetes Mellitus     PCP  ([facility])    Nail Care    Nail Problem     Bilateral Toe pain      Current shoe gear:  rx shoes    Hemoglobin A1C   Date Value Ref Range Status   08/10/2020 8.7 (H) 4.0 - 5.6 % Final     Comment:     [facility]   05/05/2020 7.9 (H) % Final     Comment:     [facility]   02/12/2020 8.2 (H) 4.0 - 5.6 % Final     Comment:     Dr. Philippe Aquino ( Endocrinology )     Patient Active Problem List   Diagnosis    Major depressive disorder, recurrent episode, mild    Hypertension    Type 2 diabetes, uncontrolled, with retinopathy    Coronary artery disease    S/P CABG x 2    Macular degeneration    Obstructive sleep apnea treated with BiPAP    Anxiety state, unspecified    Insulin  "long-term use    Primary osteoarthritis of both knees    Chronic low back pain    DJD (degenerative joint disease), lumbar    Type 2 diabetes, uncontrolled, with neuropathy    Bilateral carotid artery disease    Hyperlipidemia LDL goal <100    Type 2 diabetes, uncontrolled, with peripheral circulatory disorder    COPD with chronic bronchitis and emphysema    Atherosclerosis of abdominal aorta    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    Overweight (BMI 25.0-29.9)    Paroxysmal atrial fibrillation    Chronic stable angina    Senile purpura    Osteoarthritis of carpometacarpal (CMC) joint of left thumb    MCI (mild cognitive impairment)    Chronic vertigo    Pulmonary nodule    Dyspnea on exertion    Chronic respiratory failure with hypoxia, on home O2 therapy    Chronic combined systolic and diastolic heart failure    Ambulates with cane    ILD (interstitial lung disease)     Current Outpatient Medications on File Prior to Visit   Medication Sig Dispense Refill    albuterol (PROVENTIL/VENTOLIN HFA) 90 mcg/actuation inhaler Rescue 18 g 0    atorvastatin (LIPITOR) 40 MG tablet Take 40 mg by mouth.      atorvastatin (LIPITOR) 40 MG tablet Take 40 mg by mouth once daily.      BD INSULIN PEN NEEDLE UF SHORT 31 gauge x 5/16" Ndle       BD INSULIN SYRINGE ULTRA-FINE 1/2 mL 31 gauge x 15/64" Syrg       carvedilol (COREG) 25 MG tablet Take 1 tablet (25 mg total) by mouth 2 (two) times daily. 180 tablet 0    carvediloL (COREG) 25 MG tablet       carvediloL (COREG) 25 MG tablet Take 25 mg by mouth.      cinnamon bark 500 mg capsule Take 1,000 mg by mouth once daily.        clopidogrel (PLAVIX) 75 mg tablet Take 75 mg by mouth once daily.      clopidogreL (PLAVIX) 75 mg tablet Take 75 mg by mouth.      diclofenac sodium (VOLTAREN) 1 % Gel Apply 2 g topically 2 (two) times daily. 100 g 0    DULoxetine (CYMBALTA) 30 MG capsule TAKE 1 CAPSULE BY MOUTH  ONCE " "DAILY 90 capsule 0    fluticasone-umeclidin-vilanter (TRELEGY ELLIPTA) 100-62.5-25 mcg DsDv Inhale 1 puff into the lungs once daily. 60 each 3    fluticasone/umeclidin/vilanter (TRELEGY ELLIPTA INHL)       FLUZONE HIGHDOSE QUAD 20-21  mcg/0.7 mL Syrg PHARMACIST ADMINISTERED IMMUNIZATION ADMINISTERED AT TIME OF DISPENSING      furosemide (LASIX) 40 MG tablet Take 40 mg by mouth once daily.       furosemide (LASIX) 40 MG tablet Take 40 mg by mouth.      gabapentin (NEURONTIN) 300 MG capsule Take 4 capsules (1,200 mg total) by mouth 2 (two) times daily. (Patient taking differently: Take 900 mg by mouth 2 (two) times daily. ) 540 capsule 1    glimepiride (AMARYL) 4 MG tablet Take 4 mg by mouth daily with breakfast.       insulin NPH-insulin regular, 70/30, (NOVOLIN 70/30) 100 unit/mL (70-30) injection Inject into the skin. Inject 10 units at breakfast and inject 10 units at night      insulin syringe-needle U-100 0.3 mL 31 gauge x 15/64" Syrg USE TO INJECT INSULIN THREE TIMES DAILY      insulin syringe-needle U-100 1/2 mL 31 x 5/16" Syrg       isosorbide mononitrate (IMDUR) 30 MG 24 hr tablet Take 30 mg by mouth once daily.      isosorbide mononitrate (IMDUR) 30 MG 24 hr tablet       isosorbide mononitrate (IMDUR) 30 MG 24 hr tablet Take 30 mg by mouth.      losartan (COZAAR) 100 MG tablet Take 1 tablet (100 mg total) by mouth once daily. 90 tablet 1    losartan (COZAAR) 100 MG tablet Take 100 mg by mouth.      metformin (GLUCOPHAGE) 500 MG tablet Take 1,000 mg by mouth 2 (two) times daily with meals. 2 Tablets in the morning, 2 Tablets in the evening      metFORMIN (GLUCOPHAGE) 500 MG tablet Take 500 mg by mouth 2 (two) times daily with meals.       nitroGLYCERIN (NITROSTAT) 0.3 MG SL tablet PLACE 1 TABLET UNDER TONGUE AS NEEDED FOR CHEST PAIN EVERY 5 MINUTES, UP TO 3 DOSES IN 15MINUTES 100 tablet 0    nitroGLYCERIN (NITROSTAT) 0.4 MG SL tablet Place 1 tab under the tongue every 5 minutes for " chest pain. Max 3 tabs in 15 minutes. Call 911 for unrelieved chest pain.      nitroGLYCERIN (NITROSTAT) 0.4 MG SL tablet       nitroGLYCERIN (NITROSTAT) 0.4 MG SL tablet DISSOLVE 1 TABLET UNDER THE TONGUE EVERY 5 MINUTES FOR  CHEST PAIN . MAX 3 TABS IN  15 MINUTES, CALL 911 IF  CHEST PAIN PERSISTS.      OM-3/DHA/EPA/FISH OIL/VIT D3 (FISH OIL-VIT D3 ORAL) Take 2,000 Units by mouth once daily.       pravastatin (PRAVACHOL) 20 MG tablet Take 1 tablet (20 mg total) by mouth once daily. 90 tablet 1    pregabalin (LYRICA) 150 MG capsule Take 150 mg by mouth.      rivastigmine tartrate (EXELON) 1.5 MG capsule Take 1 capsule (1.5 mg total) by mouth 2 (two) times daily. 180 capsule 3    spironolactone (ALDACTONE) 25 MG tablet 25 mg once daily.       spironolactone (ALDACTONE) 25 MG tablet Take 25 mg by mouth.      tiZANidine (ZANAFLEX) 4 MG tablet Take 1 tablet (4 mg total) by mouth nightly as needed (muscle spasm). 30 tablet 0    VIT C/VIT E AC/LUT/COPPER/ZINC (PRESERVISION LUTEIN ORAL) Take by mouth.      warfarin (COUMADIN) 5 MG tablet Take 2 tabs by mouth on Tuesday,Thursday, Saturday and Sundays then 1.5 on all other days.      warfarin (COUMADIN) 7.5 MG tablet Take by mouth.      azelastine (ASTELIN) 137 mcg (0.1 %) nasal spray 1 spray (137 mcg total) by Nasal route 2 (two) times daily. (Patient not taking: Reported on 8/4/2020) 30 mL 2    donepezil (ARICEPT) 10 MG tablet Take 1 tablet (10 mg total) by mouth every evening. 30 tablet 11    meclizine (ANTIVERT) 12.5 mg tablet Take 1 tablet (12.5 mg total) by mouth 3 (three) times daily as needed for Dizziness. 252 tablet 1    [DISCONTINUED] diazepam (VALIUM) 2 MG tablet Take 2 mg by mouth continuous prn.       No current facility-administered medications on file prior to visit.      Review of patient's allergies indicates:   Allergen Reactions    Codeine Nausea Only     weak     Past Surgical History:   Procedure Laterality Date    broken elbow       left    COLONOSCOPY N/A 10/4/2017    Procedure: COLONOSCOPY;  Surgeon: Gabreil Leung MD;  [facility];  Service: Endoscopy;  Laterality: N/A;    EYE SURGERY      cataract    heart bypass      2004    HERNIA REPAIR      right    ROTATOR CUFF REPAIR      right  2004     Family History   Problem Relation Age of Onset    Arthritis Mother     Diabetes Mother     Stroke Mother     Heart attack Father     Cancer Brother     Throat cancer Brother     Diabetes Maternal Aunt     Diabetes Maternal Uncle     Heart disease Maternal Uncle     Diabetes Paternal Aunt     Heart disease Paternal Aunt     Heart disease Paternal Uncle     Heart disease Maternal Grandmother     Cancer Maternal Grandfather      Social History     Socioeconomic History    Marital status:      Spouse name: Not on file    Number of children: 4    Years of education: GED    Highest education level: Not on file   Occupational History    Occupation: retired tug    Social Needs    Financial resource strain: Not on file    Food insecurity     Worry: Not on file     Inability: Not on file    Transportation needs     Medical: Not on file     Non-medical: Not on file   Tobacco Use    Smoking status: Former Smoker     Packs/day: 3.00     Years: 45.00     Pack years: 135.00     Types: Cigarettes     Quit date: 2004     Years since quittin.4    Smokeless tobacco: Never Used   Substance and Sexual Activity    Alcohol use: Yes     Comment: was heavy drinker 35 years ago, rare use now    Drug use: No    Sexual activity: Yes     Partners: Female   Lifestyle    Physical activity     Days per week: Not on file     Minutes per session: Not on file    Stress: Not on file   Relationships    Social connections     Talks on phone: Not on file     Gets together: Not on file     Attends Hindu service: Not on file     Active member of club or organization: Not on file     Attends meetings of clubs or  "organizations: Not on file     Relationship status: Not on file   Other Topics Concern    Not on file   Social History Narrative    Not on file     Review of Systems   Constitution: Negative for chills, fever and weakness.   Cardiovascular: Negative for claudication and leg swelling.   Respiratory: Positive for cough. Negative for shortness of breath.    Skin: Positive for dry skin and nail changes. Negative for itching and rash.   Musculoskeletal: Positive for arthritis, back pain and joint pain. Negative for falls, joint swelling and muscle weakness.   Gastrointestinal: Negative for diarrhea, nausea and vomiting.   Neurological: Positive for numbness and paresthesias. Negative for tremors.   Psychiatric/Behavioral: Negative for altered mental status and hallucinations.           Objective:       Vitals:    10/05/20 0928   BP: 132/77   Weight: 74.4 kg (164 lb)   Height: 5' 8" (1.727 m)   PainSc: 10-Worst pain ever   PainLoc: Toe       Physical Exam   Constitutional:   General: Pt. is well-developed, well-nourished, appears stated age, in no acute distress, alert and oriented x 3. No evidence of depression, anxiety, or agitation. Calm, cooperative, and communicative. Appropriate interactions and affect.       Cardiovascular:   Pulses:       Dorsalis pedis pulses are 2+ on the right side, and 2+ on the left side.        Posterior tibial pulses are 1+ on the right side, and 1+ on the left side.   There is decreased digital hair.   Musculoskeletal:        Right foot: There is normal range of motion.        Left foot:  There is normal range of motion.   Muscle strength is 5/5 in all groups bilaterally.    Decreased stride, station of gait.  apropulsive toe off.  Increased angle and base of gait.    Patient has hammertoes of digits 2-5 bilateral partially reducible without symptom today.    Visible and palpable bunion without pain at dorsomedial 1st metatarsal head right and left.  Hallux abducted right and left " partially reducible, tracks laterally without being track bound.  No ecchymosis, erythema, edema, or cardinal signs infection or signs of trauma same foot.     Neurological: A sensory deficit is present.   [place] 5.07 monofilamant testing is diminished Farhad feet. Sharp/dull sensation diminished Bilaterally   Skin: Skin is warm, dry and intact. No abrasion, no ecchymosis, no lesion and no rash noted. He is not diaphoretic. No cyanosis or erythema. No pallor. Nails show no clubbing.   Toenails 1-5 bilaterally are elongated by 2-3 mm, thickened by 2-3 mm, discolored/yellowed, dystrophic, brittle with subungual debris.    Focal hyperkeratotic lesion consisting entirely of hyperkeratotic tissue without open skin, drainage, pus, fluctuance, malodor, or signs of infection: medial IPJ of hallux farhad    Interdigital Spaces clean, dry and without evidence of break in skin integrity   Psychiatric: He has a normal mood and affect. His speech is normal.   Nursing note and vitals reviewed.        Assessment:       Encounter Diagnoses   Name Primary?    Type 2 diabetes, uncontrolled, with peripheral circulatory disorder Yes    Type II diabetes mellitus with neurological manifestations     Onychomycosis due to dermatophyte     Corn or callus     Hammer toes of both feet     Hallux abducto valgus, left     Hallux abducto valgus, right     Paresthesia of both feet          Plan:       Percy was seen today for diabetes mellitus, nail care and nail problem.    Diagnoses and all orders for this visit:    Type 2 diabetes, uncontrolled, with peripheral circulatory disorder    Type II diabetes mellitus with neurological manifestations    Onychomycosis due to dermatophyte    Corn or callus    Hammer toes of both feet    Hallux abducto valgus, left    Hallux abducto valgus, right    Paresthesia of both feet      I counseled the patient on his conditions, their implications and medical management.      - Shoe inspection.  Diabetic Foot Education. Patient reminded of the importance of good nutrition and blood sugar control to help prevent podiatric complications of diabetes. Patient instructed on proper foot hygeine. We discussed wearing proper shoe gear, daily foot inspections, never walking without protective shoe gear, never putting sharp instruments to feet    - With patient's permission, nails were aggressively reduced and debrided x 10 to their soft tissue attachment mechanically and with electric , removing all offending nail and debris. Patient relates relief following the procedure.    - After cleansing the  area w/ alcohol prep pad the above mentioned hyperkeratosis was trimmed utilizing No 15 scapel, to a smooth base with out incident. Patient tolerated this  well and reported comfort to the area of medial hallux IPJ concha    Patient already under the care of a neurologist.      - He will continue to monitor the areas daily, inspect his feet, wear protective shoe gear when ambulatory, moisturizer to maintain skin integrity and follow in this office in approximately 2-3 months, sooner PRN

## 2020-10-09 ENCOUNTER — PATIENT OUTREACH (OUTPATIENT)
Dept: ADMINISTRATIVE | Facility: HOSPITAL | Age: 80
End: 2020-10-09

## 2020-10-09 RX ORDER — ATORVASTATIN CALCIUM 40 MG/1
40 TABLET, FILM COATED ORAL
COMMUNITY
Start: 2020-09-23 | End: 2020-11-06 | Stop reason: SDUPTHER

## 2020-10-09 RX ORDER — CARVEDILOL 25 MG/1
25 TABLET ORAL
COMMUNITY
Start: 2020-09-23 | End: 2020-11-06 | Stop reason: SDUPTHER

## 2020-10-09 RX ORDER — ISOSORBIDE MONONITRATE 30 MG/1
30 TABLET, EXTENDED RELEASE ORAL
COMMUNITY
Start: 2020-09-23 | End: 2020-11-06 | Stop reason: SDUPTHER

## 2020-10-09 RX ORDER — CLOPIDOGREL BISULFATE 75 MG/1
75 TABLET ORAL
COMMUNITY
Start: 2020-09-23 | End: 2021-05-13

## 2020-10-09 RX ORDER — LOSARTAN POTASSIUM 100 MG/1
100 TABLET ORAL
COMMUNITY
Start: 2020-09-23 | End: 2021-05-13

## 2020-10-09 RX ORDER — DICLOFENAC SODIUM 10 MG/G
GEL TOPICAL
COMMUNITY
Start: 2020-09-22 | End: 2020-11-06

## 2020-10-09 RX ORDER — FUROSEMIDE 40 MG/1
40 TABLET ORAL
COMMUNITY
Start: 2020-09-23 | End: 2020-11-06 | Stop reason: SDUPTHER

## 2020-10-23 ENCOUNTER — OFFICE VISIT (OUTPATIENT)
Dept: URGENT CARE | Facility: CLINIC | Age: 80
End: 2020-10-23
Payer: MEDICARE

## 2020-10-23 ENCOUNTER — TELEPHONE (OUTPATIENT)
Dept: FAMILY MEDICINE | Facility: CLINIC | Age: 80
End: 2020-10-23

## 2020-10-23 VITALS
HEART RATE: 76 BPM | OXYGEN SATURATION: 97 % | TEMPERATURE: 99 F | SYSTOLIC BLOOD PRESSURE: 140 MMHG | DIASTOLIC BLOOD PRESSURE: 69 MMHG

## 2020-10-23 DIAGNOSIS — S93.401A SPRAIN OF RIGHT ANKLE, UNSPECIFIED LIGAMENT, INITIAL ENCOUNTER: ICD-10-CM
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
## 2020-10-23 DIAGNOSIS — S83.91XA SPRAIN OF RIGHT KNEE, UNSPECIFIED LIGAMENT, INITIAL ENCOUNTER: Primary | ICD-10-CM

## 2020-10-23 PROBLEM — I48.19 PERSISTENT ATRIAL FIBRILLATION: Status: ACTIVE | Noted: 2017-03-06

## 2020-10-23 PROBLEM — Z92.89 HISTORY OF CARDIOVERSION: Status: ACTIVE | Noted: 2020-09-23

## 2020-10-23 PROBLEM — N52.9 ERECTILE DYSFUNCTION: Status: ACTIVE | Noted: 2020-01-24

## 2020-10-23 LAB
CTP QC/QA: YES
SARS-COV-2 RDRP RESP QL NAA+PROBE: NEGATIVE

## 2020-10-23 PROCEDURE — 99214 PR OFFICE/OUTPT VISIT, EST, LEVL IV, 30-39 MIN: ICD-10-PCS | Mod: S$GLB,,, | Performed by: PHYSICIAN ASSISTANT

## 2020-10-23 PROCEDURE — 99214 OFFICE O/P EST MOD 30 MIN: CPT | Mod: S$GLB,,, | Performed by: PHYSICIAN ASSISTANT

## 2020-10-23 PROCEDURE — 73564 X-RAY EXAM KNEE 4 OR MORE: CPT | Mod: RT,S$GLB,, | Performed by: RADIOLOGY

## 2020-10-23 PROCEDURE — U0002 COVID-19 LAB TEST NON-CDC: HCPCS | Mod: QW,S$GLB,, | Performed by: PHYSICIAN ASSISTANT

## 2020-10-23 PROCEDURE — 73610 XR ANKLE COMPLETE 3 VIEW RIGHT: ICD-10-PCS | Mod: RT,S$GLB,, | Performed by: RADIOLOGY

## 2020-10-23 PROCEDURE — 73564 XR KNEE COMP 4 OR MORE VIEWS RIGHT: ICD-10-PCS | Mod: RT,S$GLB,, | Performed by: RADIOLOGY

## 2020-10-23 PROCEDURE — U0002: ICD-10-PCS | Mod: QW,S$GLB,, | Performed by: PHYSICIAN ASSISTANT

## 2020-10-23 PROCEDURE — 73610 X-RAY EXAM OF ANKLE: CPT | Mod: RT,S$GLB,, | Performed by: RADIOLOGY

## 2020-10-23 NOTE — PROGRESS NOTES
Subjective:       Patient ID: Percy Ramirez is a 80 y.o. male.    Vitals:  temperature is 98.6 °F (37 °C). His blood pressure is 140/69 (abnormal) and his pulse is 76. His oxygen saturation is 97%.     Chief Complaint: Ankle Pain    Patient states he was standing in his kitchen when he lost his balance and fell.  He twisted his right knee and ankle.  Patient states he thought he felt a pop in his knee.  He walks with a limp since the injury.  Patient is also complaining of some back pain, however he has been having this back pain for few weeks now and it is not changed since the fall.  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    Fall  The accident occurred 1 to 3 hours ago. The fall occurred in unknown circumstances. He fell from a height of 3 to 5 ft. The point of impact was the right knee. The pain is present in the right knee. Pertinent negatives include no abdominal pain, hematuria or loss of consciousness. He has tried acetaminophen for the symptoms. The treatment provided no relief.       Constitution: Negative for fatigue.   HENT: Negative for facial swelling and facial trauma.    Neck: Negative for neck stiffness.   Cardiovascular: Negative for chest trauma.   Eyes: Negative for eye trauma, double vision and blurred vision.   Gastrointestinal: Negative for abdominal trauma, abdominal pain and rectal bleeding.   Genitourinary: Negative for hematuria, genital trauma and pelvic pain.   Musculoskeletal: Positive for trauma and joint pain. Negative for pain, joint swelling, abnormal ROM of joint and pain with walking.   Skin: Negative for color change, wound, abrasion and laceration.   Neurological: Negative for dizziness, history of vertigo, light-headedness, coordination disturbances, altered mental status and loss of consciousness.   Hematologic/Lymphatic: Negative for history of bleeding disorder.   Psychiatric/Behavioral: Negative for altered mental status.       Objective:      Physical Exam    Constitutional: He is oriented to person, place, and time. He appears well-developed. He is cooperative.  Non-toxic appearance. He does not appear ill. No distress.   HENT:   Head: Normocephalic and atraumatic.   Ears:   Right Ear: Hearing normal.   Left Ear: Hearing normal.   Nose: No epistaxis.   Mouth/Throat: Mucous membranes are normal.   Eyes: Conjunctivae and lids are normal. No scleral icterus.   Neck: Trachea normal, full passive range of motion without pain and phonation normal. Neck supple. No neck rigidity. No edema and no erythema present.   Cardiovascular: Normal rate, regular rhythm, normal heart sounds and normal pulses.   Pulmonary/Chest: Effort normal and breath sounds normal. No respiratory distress. He has no decreased breath sounds. He has no rhonchi.   Abdominal: Normal appearance.   Musculoskeletal:         General: No deformity.      Right knee: He exhibits decreased range of motion and swelling. He exhibits no LCL laxity, no bony tenderness and no MCL laxity. Tenderness (posterior) found. Lateral joint line tenderness noted.      Right ankle: He exhibits decreased range of motion and swelling. Tenderness. Lateral malleolus and medial malleolus tenderness found. Achilles tendon normal.      Lumbar back: He exhibits decreased range of motion and tenderness (right SI joint). He exhibits no bony tenderness.   Neurological: He is alert and oriented to person, place, and time. He exhibits normal muscle tone. Coordination normal.   Skin: Skin is warm, dry, intact, not diaphoretic, not pale and no rash. Psychiatric: His speech is normal and behavior is normal. Judgment and thought content normal.   Nursing note and vitals reviewed.      X-ray Knee Complete 4 Or More Views Right    Result Date: 10/23/2020  EXAMINATION: XR KNEE COMP 4 OR MORE VIEWS RIGHT CLINICAL HISTORY: Unspecified fall, initial encounter TECHNIQUE: Three views COMPARISON: Left knee series 02/25/2015 FINDINGS: Generalized  osteopenia.  Suspected nonspecific suprapatellar joint effusion.  No displaced fracture, dislocation or destructive osseous process.  Mild tricompartmental degenerative change noted at each knee.  Metallic clips are again noted at the medial and posterior aspect of the left knee.  No subcutaneous emphysema.  Scattered atherosclerotic vascular calcifications noted.     Suspected nonspecific suprapatellar joint effusion without acute displaced fracture-dislocation identified. Electronically signed by: Cristi Pelayo MD Date:    10/23/2020 Time:    16:00    Xr Ankle Complete 3 View Right    Result Date: 10/23/2020  EXAMINATION: XR ANKLE COMPLETE 3 VIEW RIGHT CLINICAL HISTORY: Unspecified fall, initial encounter TECHNIQUE: AP, lateral, and oblique images of the right ankle were performed. COMPARISON: None FINDINGS: Osseous structures appear intact without evidence of fracture or osseous destructive process.  No apparent dislocation. Ankle joint is maintained without evidence of joint space narrowing or osteochondral lesion. Diffuse soft tissue swelling particularly over the medial aspect of the ankle.     Soft tissue swelling without evidence of a displaced fracture or dislocation. Electronically signed by: Abraham Shepherd MD Date:    10/23/2020 Time:    16:21      Assessment:       1. Sprain of right knee, unspecified ligament, initial encounter    2. Fall, initial encounter    3. Sprain of right ankle, unspecified ligament, initial encounter        Plan:         Sprain of right knee, unspecified ligament, initial encounter  -     POCT COVID-19 Rapid Screening  -     Cancel: KNEE BRACE FOR HOME USE    Fall, initial encounter  -     XR ANKLE COMPLETE 3 VIEW RIGHT; Future; Expected date: 10/23/2020  -     X-Ray Knee Complete 4 Or More Views Right; Future; Expected date: 10/23/2020    Sprain of right ankle, unspecified ligament, initial encounter         Percy was seen today for ankle pain.    Diagnoses and all orders for  this visit:    Sprain of right knee, unspecified ligament, initial encounter  -     POCT COVID-19 Rapid Screening  -     Cancel: KNEE BRACE FOR HOME USE    Fall, initial encounter  -     XR ANKLE COMPLETE 3 VIEW RIGHT; Future  -     X-Ray Knee Complete 4 Or More Views Right; Future    Sprain of right ankle, unspecified ligament, initial encounter      Patient Instructions   - Rest.  - Drink plenty of fluids.  - Take Tylenol and/or Ibuprofen as directed as needed for fever/pain.  Do not take more than the recommended dose.  - follow up with your PCP within the next 1-2 weeks as needed.  - Ice for the next 24-48 hours.    - Elevate when possible.   - You must understand that you have received an Urgent Care treatment only and that you may be released before all of your medical problems are known or treated.   - You, the patient, will arrange for follow up care as instructed.   - If your condition worsens or fails to improve we recommend that you receive another evaluation at the ER immediately or contact your PCP to discuss your concerns.   - You can call [phone] or [phone] to help schedule an appointment with the appropriate provider.      Knee Sprain    A sprain is an injury to the ligaments or capsule that holds a joint together. There are no broken bones. Most sprains take 3 to 6 weeks to heal. If it a severe sprain where the ligament is completely torn, it can take months to recover.  Most knee sprains are treated with a splint, knee immobilizer brace, or elastic wrap for support. Severe sprains may require surgery.  Home care  · Stay off the injured leg as much as possible until you can walk on it without pain. If you have a lot of pain with walking, crutches or a walker may be prescribed. (These can be rented or purchased at many pharmacies and surgical or orthopedic supply stores). Follow your healthcare provider's advice about when to begin putting weight on that leg.  · Keep your leg elevated  to reduce pain and swelling. When sleeping, place a pillow under the injured leg. When sitting, support the injured leg so it is level with your waist. This is very important during the first 48 hours.  · Apply an ice pack over the injured area for 15 to 20 minutes every 3 to 6 hours. You should do this for the first 24 to 48 hours. You can make an ice pack by filling a plastic bag that seals at the top with ice cubes and then wrapping it with a thin towel. Continue to use ice packs for relief of pain and swelling as needed. As the ice melts, be careful to avoid getting your wrap, splint, or cast wet. After 48 hours, apply heat (warm shower or warm bath) for 15 to 20 minutes several times a day, or alternate ice and heat. You can place the ice pack directly over the splint. If you have to wear a hook-and-loop knee brace, you can open it to apply the ice pack, or heat, directly to the knee. Never put ice directly on the skin. Always wrap the ice in a towel or other type of cloth.  · You may use over-the-counter pain medicine to control pain, unless another pain medicine was prescribed.If you have chronic liver or kidney disease or ever had a stomach ulcer or GI bleeding, talk with your healthcare provider before using these medicines.  · If you were given a splint, keep it completely dry at all times. Bathe with your splint out of the water, protected with 2 large plastic bags, rubber-banded at the top end. If a fiberglass splint gets wet, you can dry it with a hair dryer. If you have a hook-and-loop knee brace, you can remove this to bathe, unless told otherwise.  Follow-up care  Follow up with your doctor as advised. Any X-rays you had today dont show any broken bones, breaks, or fractures. Sometimes fractures dont show up on the first X-ray. Bruises and sprains can sometimes hurt as much as a fracture. These injuries can take time to heal completely. If your symptoms dont improve or they get worse, talk with  your doctor. You may need a repeat X-ray. If X-rays were taken, you will be told of any new findings that may affect your care.  Call 911  Call 911 if you have:  ·  Shortness of breath  ·  Chest pain  When to seek medical advice  Call your healthcare provider right away if any of these occur:  · The splint or knee immobilizer brace becomes wet or soft  · The fiberglass cast or splint remains wet for more than 24 hours  · Pain or swelling increases  · The injured leg or toes become cold, blue, numb, or tingly  Date Last Reviewed: 11/20/2015  © 1613-0303 [employer]. [address] [place] [address], [place] [id number]. All rights reserved. This information is not intended as a substitute for professional medical care. Always follow your healthcare professional's instructions.        Ankle Sprain, with X-Ray   A sprain is an injury to the ligaments that hold the ankle joint together. There are no broken bones. Most sprains take about 4 to 6 weeks to heal. A severe sprain, where the ligament is completely torn, can take several months to recover.  Mild to moderate sprains may be treated with an elastic wrap or an in-shoe splint. This will provide support and prevent re-injury. A mild sprain may not need any additional support. A severe sprain may require surgery to repair. In some cases a cast or boot may be needed.  Home care  · Stay off your injured ankle as much as possible until you can walk on it without pain. If you have a lot of pain with walking, then crutches or a walker may be prescribed. These can be rented or purchased at many pharmacies and surgical or orthopedic supply stores. Follow your healthcare providers advice about when to begin bearing weight on that leg.  · Keep your leg elevated to reduce pain and swelling. When sleeping, place a pillow under your injured ankle. When sitting, support your injured ankle and leg so they are level with your waist. This is very important during the first 48  hours.  · Place an ice pack over the injured area for no more than 20 minutes. Do this every 3 to 6 hours for the first 24 to 48 hours, or as instructed. To make an ice pack, put ice cubes in a plastic bag that seals at the top. Wrap the bag in a clean, thin towel or cloth. You can place the ice pack directly over the wrap, splint, or cast. Never place an ice pack directly on your skin. As the ice melts, be careful not to get any wrap, splint, or cast wet. Keep using ice packs as needed to ease pain and swelling.    · If you have a boot, open it to apply an ice pack, unless told otherwise by your provider. Wrap the ice pack in a clean, thin towel or cloth. Never put an ice pack directly on your skin.  · After 48 hours, it may also be helpful to apply heat for no more than 20 minutes, several times a day. You can do this with a heating pad or warm compress. Or you may want to go back and forth between using ice and heat. Never apply heat directly to the skin. Always wrap the heating pad or warm compress in a clean, thin towel or cloth.  · You may use over-the-counter pain medicine to ease pain, unless another pain medicine was prescribed. Talk with your provider before using these medicines if you have chronic liver or kidney disease, or have ever had a stomach ulcer or GI (gastrointestinal) bleeding.  · You may return to sports after your ankle heals, when you can run without pain.  · You are at risk of getting injured again for the first 6 weeks. During that time, protect your ankle with an in-shoe splint that prevents your ankle from tilting side to side. This is very important if you do active work or play sports during that time.  Follow-up care  Any X-rays you had today dont show any broken bones, breaks, or fractures. Bruises and sprains can sometimes hurt as much as a fracture. These injuries can take time to heal completely. If your symptoms dont improve or they get worse, talk with your healthcare  provider. You may need a repeat X-ray.  When to seek medical advice  Call your healthcare provider right away if any of these occur:  · The plaster cast or splint gets wet or soft  · The fiberglass cast or splint gets wet and does not dry for 24 hours  · The pain or swelling increases, or redness appears  · The cast has a bad smell  · Fever of 100.4 F (38 C) or higher, or as directed  · Your toes become cold, blue, numb, or tingly  · You re-injure your ankle  Date Last Reviewed: 11/20/2015  © 0696-5168 [employer]. [address] [place] [address], [place] [address]. All rights reserved. This information is not intended as a substitute for professional medical care. Always follow your healthcare professional's instructions.

## 2020-10-23 NOTE — TELEPHONE ENCOUNTER
Spoke with patient he had a question if Pneumonia shot and Pneumococcal vaccine were the same? Advised patient vaccines are the same. Does patient need any more vaccines for the year? Please advise

## 2020-10-23 NOTE — TELEPHONE ENCOUNTER
----- Message from Dorys Mosqueda sent at 10/23/2020  8:20 AM CDT -----  Contact: Patient [phone]  Type: Patient Call Back    Who called: Patient    What is the request in detail: Patient has a question about the Pneumococcal Vaccine. Need to know if it's the same as the Pneumonia Shot? Please call pt.    Would the patient rather a call back or a response via [facility]? Call back    Best call back number: [phone]

## 2020-10-23 NOTE — PATIENT INSTRUCTIONS
- Rest.  - Drink plenty of fluids.  - Take Tylenol and/or Ibuprofen as directed as needed for fever/pain.  Do not take more than the recommended dose.  - follow up with your PCP within the next 1-2 weeks as needed.  - Ice for the next 24-48 hours.    - Elevate when possible.   - You must understand that you have received an Urgent Care treatment only and that you may be released before all of your medical problems are known or treated.   - You, the patient, will arrange for follow up care as instructed.   - If your condition worsens or fails to improve we recommend that you receive another evaluation at the ER immediately or contact your PCP to discuss your concerns.   - You can call [phone] or [phone] to help schedule an appointment with the appropriate provider.      Knee Sprain    A sprain is an injury to the ligaments or capsule that holds a joint together. There are no broken bones. Most sprains take 3 to 6 weeks to heal. If it a severe sprain where the ligament is completely torn, it can take months to recover.  Most knee sprains are treated with a splint, knee immobilizer brace, or elastic wrap for support. Severe sprains may require surgery.  Home care  · Stay off the injured leg as much as possible until you can walk on it without pain. If you have a lot of pain with walking, crutches or a walker may be prescribed. (These can be rented or purchased at many pharmacies and surgical or orthopedic supply stores). Follow your healthcare provider's advice about when to begin putting weight on that leg.  · Keep your leg elevated to reduce pain and swelling. When sleeping, place a pillow under the injured leg. When sitting, support the injured leg so it is level with your waist. This is very important during the first 48 hours.  · Apply an ice pack over the injured area for 15 to 20 minutes every 3 to 6 hours. You should do this for the first 24 to 48 hours. You can make an ice pack by filling a  plastic bag that seals at the top with ice cubes and then wrapping it with a thin towel. Continue to use ice packs for relief of pain and swelling as needed. As the ice melts, be careful to avoid getting your wrap, splint, or cast wet. After 48 hours, apply heat (warm shower or warm bath) for 15 to 20 minutes several times a day, or alternate ice and heat. You can place the ice pack directly over the splint. If you have to wear a hook-and-loop knee brace, you can open it to apply the ice pack, or heat, directly to the knee. Never put ice directly on the skin. Always wrap the ice in a towel or other type of cloth.  · You may use over-the-counter pain medicine to control pain, unless another pain medicine was prescribed.If you have chronic liver or kidney disease or ever had a stomach ulcer or GI bleeding, talk with your healthcare provider before using these medicines.  · If you were given a splint, keep it completely dry at all times. Bathe with your splint out of the water, protected with 2 large plastic bags, rubber-banded at the top end. If a fiberglass splint gets wet, you can dry it with a hair dryer. If you have a hook-and-loop knee brace, you can remove this to bathe, unless told otherwise.  Follow-up care  Follow up with your doctor as advised. Any X-rays you had today dont show any broken bones, breaks, or fractures. Sometimes fractures dont show up on the first X-ray. Bruises and sprains can sometimes hurt as much as a fracture. These injuries can take time to heal completely. If your symptoms dont improve or they get worse, talk with your doctor. You may need a repeat X-ray. If X-rays were taken, you will be told of any new findings that may affect your care.  Call 911  Call 911 if you have:  ·  Shortness of breath  ·  Chest pain  When to seek medical advice  Call your healthcare provider right away if any of these occur:  · The splint or knee immobilizer brace becomes wet or soft  · The fiberglass  cast or splint remains wet for more than 24 hours  · Pain or swelling increases  · The injured leg or toes become cold, blue, numb, or tingly  Date Last Reviewed: 11/20/2015  © 1685-8010 [employer]. [address] [place] [address], [place] [address]. All rights reserved. This information is not intended as a substitute for professional medical care. Always follow your healthcare professional's instructions.        Ankle Sprain, with X-Ray   A sprain is an injury to the ligaments that hold the ankle joint together. There are no broken bones. Most sprains take about 4 to 6 weeks to heal. A severe sprain, where the ligament is completely torn, can take several months to recover.  Mild to moderate sprains may be treated with an elastic wrap or an in-shoe splint. This will provide support and prevent re-injury. A mild sprain may not need any additional support. A severe sprain may require surgery to repair. In some cases a cast or boot may be needed.  Home care  · Stay off your injured ankle as much as possible until you can walk on it without pain. If you have a lot of pain with walking, then crutches or a walker may be prescribed. These can be rented or purchased at many pharmacies and surgical or orthopedic supply stores. Follow your healthcare providers advice about when to begin bearing weight on that leg.  · Keep your leg elevated to reduce pain and swelling. When sleeping, place a pillow under your injured ankle. When sitting, support your injured ankle and leg so they are level with your waist. This is very important during the first 48 hours.  · Place an ice pack over the injured area for no more than 20 minutes. Do this every 3 to 6 hours for the first 24 to 48 hours, or as instructed. To make an ice pack, put ice cubes in a plastic bag that seals at the top. Wrap the bag in a clean, thin towel or cloth. You can place the ice pack directly over the wrap, splint, or cast. Never place an ice pack directly  on your skin. As the ice melts, be careful not to get any wrap, splint, or cast wet. Keep using ice packs as needed to ease pain and swelling.    · If you have a boot, open it to apply an ice pack, unless told otherwise by your provider. Wrap the ice pack in a clean, thin towel or cloth. Never put an ice pack directly on your skin.  · After 48 hours, it may also be helpful to apply heat for no more than 20 minutes, several times a day. You can do this with a heating pad or warm compress. Or you may want to go back and forth between using ice and heat. Never apply heat directly to the skin. Always wrap the heating pad or warm compress in a clean, thin towel or cloth.  · You may use over-the-counter pain medicine to ease pain, unless another pain medicine was prescribed. Talk with your provider before using these medicines if you have chronic liver or kidney disease, or have ever had a stomach ulcer or GI (gastrointestinal) bleeding.  · You may return to sports after your ankle heals, when you can run without pain.  · You are at risk of getting injured again for the first 6 weeks. During that time, protect your ankle with an in-shoe splint that prevents your ankle from tilting side to side. This is very important if you do active work or play sports during that time.  Follow-up care  Any X-rays you had today dont show any broken bones, breaks, or fractures. Bruises and sprains can sometimes hurt as much as a fracture. These injuries can take time to heal completely. If your symptoms dont improve or they get worse, talk with your healthcare provider. You may need a repeat X-ray.  When to seek medical advice  Call your healthcare provider right away if any of these occur:  · The plaster cast or splint gets wet or soft  · The fiberglass cast or splint gets wet and does not dry for 24 hours  · The pain or swelling increases, or redness appears  · The cast has a bad smell  · Fever of 100.4 F (38 C) or higher, or as  directed  · Your toes become cold, blue, numb, or tingly  · You re-injure your ankle  Date Last Reviewed: 11/20/2015  © 4611-8789 [employer]. [address] [place] [address], [place] [address]. All rights reserved. This information is not intended as a substitute for professional medical care. Always follow your healthcare professional's instructions.

## 2020-10-23 NOTE — TELEPHONE ENCOUNTER
Please advise patient that there are 2 different pneumonia vaccinations.  He has completed both and does not need any further vaccinations for pneumonia in his lifetime.  I see that he already had his flu shot for this year.  The only other vaccine I would recommend is the shingles vaccine.

## 2020-10-26 ENCOUNTER — TELEPHONE (OUTPATIENT)
Dept: FAMILY MEDICINE | Facility: CLINIC | Age: 80
End: 2020-10-26

## 2020-10-26 NOTE — TELEPHONE ENCOUNTER
----- Message from Brandon Julien sent at 10/26/2020  7:35 AM CDT -----  Regarding: questions about pneumonia vacc  Type: Patient Call Back    Who called:Percy     What is the request in detail: The patient is requesting a call back from the staff. He would like to know if he should do the pneumonia injections this year. Patient also mentioned about the meningitis injection as well.    Can the clinic reply by [facility]?no    Would the patient rather a call back or a response via [facility]? Call back    Best call back number:[phone]

## 2020-10-26 NOTE — TELEPHONE ENCOUNTER
Spoke with pt informed him he has had pneumonia injections and isn't due to have anymore. Pt verbalized understanding.

## 2020-11-02 ENCOUNTER — TELEPHONE (OUTPATIENT)
Dept: FAMILY MEDICINE | Facility: CLINIC | Age: 80
End: 2020-11-02

## 2020-11-02 ENCOUNTER — LAB VISIT (OUTPATIENT)
Dept: LAB | Facility: HOSPITAL | Age: 80
End: 2020-11-02
Attending: NURSE PRACTITIONER
Payer: MEDICARE

## 2020-11-02 DIAGNOSIS — I10 ESSENTIAL HYPERTENSION: ICD-10-CM

## 2020-11-02 DIAGNOSIS — Z11.59 NEED FOR HEPATITIS C SCREENING TEST: ICD-10-CM

## 2020-11-02 LAB
ALBUMIN SERPL BCP-MCNC: 3.5 G/DL (ref 3.5–5.2)
ALP SERPL-CCNC: 77 U/L (ref 55–135)
ALT SERPL W/O P-5'-P-CCNC: 11 U/L (ref 10–44)
ANION GAP SERPL CALC-SCNC: 11 MMOL/L (ref 8–16)
AST SERPL-CCNC: 12 U/L (ref 10–40)
BASOPHILS # BLD AUTO: 0.04 K/UL (ref 0–0.2)
BASOPHILS NFR BLD: 0.6 % (ref 0–1.9)
BILIRUB SERPL-MCNC: 1.6 MG/DL (ref 0.1–1)
BUN SERPL-MCNC: 11 MG/DL (ref 8–23)
CALCIUM SERPL-MCNC: 9.3 MG/DL (ref 8.7–10.5)
CHLORIDE SERPL-SCNC: 101 MMOL/L (ref 95–110)
CO2 SERPL-SCNC: 24 MMOL/L (ref 23–29)
CREAT SERPL-MCNC: 1.1 MG/DL (ref 0.5–1.4)
DIFFERENTIAL METHOD: ABNORMAL
EOSINOPHIL # BLD AUTO: 0.2 K/UL (ref 0–0.5)
EOSINOPHIL NFR BLD: 2.7 % (ref 0–8)
ERYTHROCYTE [DISTWIDTH] IN BLOOD BY AUTOMATED COUNT: 14.6 % (ref 11.5–14.5)
EST. GFR  (AFRICAN AMERICAN): >60 ML/MIN/1.73 M^2
EST. GFR  (NON AFRICAN AMERICAN): >60 ML/MIN/1.73 M^2
ESTIMATED AVG GLUCOSE: 206 MG/DL (ref 68–131)
GLUCOSE SERPL-MCNC: 209 MG/DL (ref 70–110)
HBA1C MFR BLD HPLC: 8.8 % (ref 4–5.6)
HCT VFR BLD AUTO: 34.2 % (ref 40–54)
HGB BLD-MCNC: 11.1 G/DL (ref 14–18)
IMM GRANULOCYTES # BLD AUTO: 0.02 K/UL (ref 0–0.04)
IMM GRANULOCYTES NFR BLD AUTO: 0.3 % (ref 0–0.5)
LYMPHOCYTES # BLD AUTO: 0.8 K/UL (ref 1–4.8)
LYMPHOCYTES NFR BLD: 12.1 % (ref 18–48)
MCH RBC QN AUTO: 29.9 PG (ref 27–31)
MCHC RBC AUTO-ENTMCNC: 32.5 G/DL (ref 32–36)
MCV RBC AUTO: 92 FL (ref 82–98)
MONOCYTES # BLD AUTO: 0.9 K/UL (ref 0.3–1)
MONOCYTES NFR BLD: 13.6 % (ref 4–15)
NEUTROPHILS # BLD AUTO: 4.7 K/UL (ref 1.8–7.7)
NEUTROPHILS NFR BLD: 70.7 % (ref 38–73)
NRBC BLD-RTO: 0 /100 WBC
PLATELET # BLD AUTO: 266 K/UL (ref 150–350)
PMV BLD AUTO: 9.9 FL (ref 9.2–12.9)
POTASSIUM SERPL-SCNC: 4.4 MMOL/L (ref 3.5–5.1)
PROT SERPL-MCNC: 7.2 G/DL (ref 6–8.4)
PTH-INTACT SERPL-MCNC: 98 PG/ML (ref 9–77)
RBC # BLD AUTO: 3.71 M/UL (ref 4.6–6.2)
SODIUM SERPL-SCNC: 136 MMOL/L (ref 136–145)
WBC # BLD AUTO: 6.67 K/UL (ref 3.9–12.7)

## 2020-11-02 PROCEDURE — 85025 COMPLETE CBC W/AUTO DIFF WBC: CPT

## 2020-11-02 PROCEDURE — 86803 HEPATITIS C AB TEST: CPT

## 2020-11-02 PROCEDURE — 83970 ASSAY OF PARATHORMONE: CPT

## 2020-11-02 PROCEDURE — 36415 COLL VENOUS BLD VENIPUNCTURE: CPT | Mod: PO

## 2020-11-02 PROCEDURE — 80053 COMPREHEN METABOLIC PANEL: CPT

## 2020-11-02 PROCEDURE — 83036 HEMOGLOBIN GLYCOSYLATED A1C: CPT

## 2020-11-02 NOTE — TELEPHONE ENCOUNTER
----- Message from Marquita Reynaga sent at 10/30/2020  2:29 PM CDT -----  Type: Patient Call Back    Who called: Self    What is the request in detail: patient would like to speak with nurse about how many tylenol he can take a day. Please Call     Can the clinic reply by [facility]? No    Would the patient rather a call back or a response via [facility]? Call    Best call back number: [phone]

## 2020-11-03 LAB — HCV AB SERPL QL IA: NEGATIVE

## 2020-11-06 ENCOUNTER — OFFICE VISIT (OUTPATIENT)
Dept: FAMILY MEDICINE | Facility: CLINIC | Age: 80
End: 2020-11-06
Payer: MEDICARE

## 2020-11-06 VITALS
BODY MASS INDEX: 25.7 KG/M2 | TEMPERATURE: 98 F | DIASTOLIC BLOOD PRESSURE: 62 MMHG | WEIGHT: 169.56 LBS | SYSTOLIC BLOOD PRESSURE: 124 MMHG | OXYGEN SATURATION: 96 % | HEIGHT: 68 IN | HEART RATE: 98 BPM

## 2020-11-06 DIAGNOSIS — J43.9 COPD WITH CHRONIC BRONCHITIS AND EMPHYSEMA: ICD-10-CM

## 2020-11-06 DIAGNOSIS — N25.81 SECONDARY HYPERPARATHYROIDISM OF RENAL ORIGIN: ICD-10-CM

## 2020-11-06 DIAGNOSIS — Z79.4 INSULIN LONG-TERM USE: ICD-10-CM

## 2020-11-06 DIAGNOSIS — F41.1 ANXIETY STATE: ICD-10-CM

## 2020-11-06 DIAGNOSIS — I20.89 CHRONIC STABLE ANGINA: ICD-10-CM

## 2020-11-06 DIAGNOSIS — I48.19 PERSISTENT ATRIAL FIBRILLATION: ICD-10-CM

## 2020-11-06 DIAGNOSIS — M54.50 CHRONIC MIDLINE LOW BACK PAIN WITHOUT SCIATICA: ICD-10-CM

## 2020-11-06 DIAGNOSIS — G89.29 CHRONIC MIDLINE LOW BACK PAIN WITHOUT SCIATICA: ICD-10-CM

## 2020-11-06 DIAGNOSIS — D69.2 SENILE PURPURA: ICD-10-CM

## 2020-11-06 DIAGNOSIS — M17.0 PRIMARY OSTEOARTHRITIS OF BOTH KNEES: ICD-10-CM

## 2020-11-06 DIAGNOSIS — G47.33 OBSTRUCTIVE SLEEP APNEA TREATED WITH BIPAP: ICD-10-CM

## 2020-11-06 DIAGNOSIS — Z12.11 COLON CANCER SCREENING: ICD-10-CM

## 2020-11-06 DIAGNOSIS — Z99.81 CHRONIC RESPIRATORY FAILURE WITH HYPOXIA, ON HOME O2 THERAPY: ICD-10-CM

## 2020-11-06 DIAGNOSIS — Z91.148 NONCOMPLIANCE WITH MEDICATION REGIMEN: ICD-10-CM

## 2020-11-06 DIAGNOSIS — I50.42 CHRONIC COMBINED SYSTOLIC AND DIASTOLIC HEART FAILURE: ICD-10-CM

## 2020-11-06 DIAGNOSIS — J96.11 CHRONIC RESPIRATORY FAILURE WITH HYPOXIA, ON HOME O2 THERAPY: ICD-10-CM

## 2020-11-06 DIAGNOSIS — I10 ESSENTIAL HYPERTENSION: ICD-10-CM

## 2020-11-06 DIAGNOSIS — E78.5 HYPERLIPIDEMIA LDL GOAL <100: ICD-10-CM

## 2020-11-06 DIAGNOSIS — F33.0 MAJOR DEPRESSIVE DISORDER, RECURRENT EPISODE, MILD: ICD-10-CM

## 2020-11-06 DIAGNOSIS — J44.89 COPD WITH CHRONIC BRONCHITIS AND EMPHYSEMA: ICD-10-CM

## 2020-11-06 DIAGNOSIS — Z23 NEED FOR SHINGLES VACCINE: ICD-10-CM

## 2020-11-06 DIAGNOSIS — Z00.00 ROUTINE MEDICAL EXAM: Primary | ICD-10-CM

## 2020-11-06 DIAGNOSIS — Z99.89 AMBULATES WITH CANE: ICD-10-CM

## 2020-11-06 PROCEDURE — 3078F DIAST BP <80 MM HG: CPT | Mod: CPTII,S$GLB,, | Performed by: INTERNAL MEDICINE

## 2020-11-06 PROCEDURE — 99999 PR PBB SHADOW E&M-EST. PATIENT-LVL V: ICD-10-PCS | Mod: PBBFAC,,, | Performed by: INTERNAL MEDICINE

## 2020-11-06 PROCEDURE — 3052F PR MOST RECENT HEMOGLOBIN A1C LEVEL 8.0 - < 9.0%: ICD-10-PCS | Mod: CPTII,S$GLB,, | Performed by: INTERNAL MEDICINE

## 2020-11-06 PROCEDURE — 3052F HG A1C>EQUAL 8.0%<EQUAL 9.0%: CPT | Mod: CPTII,S$GLB,, | Performed by: INTERNAL MEDICINE

## 2020-11-06 PROCEDURE — 99214 PR OFFICE/OUTPT VISIT, EST, LEVL IV, 30-39 MIN: ICD-10-PCS | Mod: 25,S$GLB,, | Performed by: INTERNAL MEDICINE

## 2020-11-06 PROCEDURE — 3074F SYST BP LT 130 MM HG: CPT | Mod: CPTII,S$GLB,, | Performed by: INTERNAL MEDICINE

## 2020-11-06 PROCEDURE — 99214 OFFICE O/P EST MOD 30 MIN: CPT | Mod: 25,S$GLB,, | Performed by: INTERNAL MEDICINE

## 2020-11-06 PROCEDURE — 99499 RISK ADDL DX/OHS AUDIT: ICD-10-PCS | Mod: S$GLB,,, | Performed by: INTERNAL MEDICINE

## 2020-11-06 PROCEDURE — 90471 IMMUNIZATION ADMIN: CPT | Mod: S$GLB,,, | Performed by: INTERNAL MEDICINE

## 2020-11-06 PROCEDURE — 3074F PR MOST RECENT SYSTOLIC BLOOD PRESSURE < 130 MM HG: ICD-10-PCS | Mod: CPTII,S$GLB,, | Performed by: INTERNAL MEDICINE

## 2020-11-06 PROCEDURE — 99499 UNLISTED E&M SERVICE: CPT | Mod: S$GLB,,, | Performed by: INTERNAL MEDICINE

## 2020-11-06 PROCEDURE — 99999 PR PBB SHADOW E&M-EST. PATIENT-LVL V: CPT | Mod: PBBFAC,,, | Performed by: INTERNAL MEDICINE

## 2020-11-06 PROCEDURE — 90750 HZV VACC RECOMBINANT IM: CPT | Mod: S$GLB,,, | Performed by: INTERNAL MEDICINE

## 2020-11-06 PROCEDURE — 3078F PR MOST RECENT DIASTOLIC BLOOD PRESSURE < 80 MM HG: ICD-10-PCS | Mod: CPTII,S$GLB,, | Performed by: INTERNAL MEDICINE

## 2020-11-06 PROCEDURE — 90750 ZOSTER RECOMBINANT VACCINE: ICD-10-PCS | Mod: S$GLB,,, | Performed by: INTERNAL MEDICINE

## 2020-11-06 PROCEDURE — 90471 ZOSTER RECOMBINANT VACCINE: ICD-10-PCS | Mod: S$GLB,,, | Performed by: INTERNAL MEDICINE
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
## 2020-11-12 LAB
LEFT EYE DM RETINOPATHY: POSITIVE
RIGHT EYE DM RETINOPATHY: POSITIVE

## 2020-11-20 ENCOUNTER — TELEPHONE (OUTPATIENT)
Dept: FAMILY MEDICINE | Facility: CLINIC | Age: 80
End: 2020-11-20

## 2020-11-20 NOTE — TELEPHONE ENCOUNTER
Spoke with  office informed them pt wanted labs sent to their office.Also inquired about which labs were needed.Stated pt needs recent Lipid Panel and microalbumin other labs were fine. Informed pt of this information.Verbalzied understanding

## 2020-11-20 NOTE — TELEPHONE ENCOUNTER
----- Message from Roselia Reyes sent at 11/20/2020 10:40 AM CST -----  Regarding: callback  Contact: pt  Type: Patient Call Back    Who called: PT     What is the request in detail: PT called to speak with a nurse in regards to his medication     Can the clinic reply by [facility]? No     Would the patient rather a call back or a response via [facility]? Callback     Best call back number: [phone] ([place])      Additional Information:

## 2020-11-20 NOTE — TELEPHONE ENCOUNTER
Spoke with pt he states he needs his most recent labs sent over to his endocrinologist.Pt was unable to provide office number. States he will contact office back with number.

## 2020-11-20 NOTE — TELEPHONE ENCOUNTER
----- Message from Jun Hearn sent at 11/20/2020  8:24 AM CST -----  Type: Patient Call Back    Who called: Pt     What is the request in detail: Pt would like to know if he should keep his endocrinologist. Pt states he does not understand why he needs 2 different providers to prescribe his medications. Pt states he has to pay see is endocrinologist, and not to see Dr. Edmondson. Pt would like to discuss this over with the Dr because he doesn't know which provider to keep.     Can the clinic reply by [facility]? No     Would the patient rather a call back or a response via [facility]?  Call back    Best call back number: [phone]    Additional Information:      Thank You

## 2020-11-20 NOTE — TELEPHONE ENCOUNTER
----- Message from Irina Nunez sent at 11/20/2020 11:07 AM CST -----  Type: Patient Call Back    Who called: Self     What is the request in detail: patient says Dr. Aquino's  Endocrinologist number is [phone]    Can the clinic reply by [facility]? No     Would the patient rather a call back or a response via [facility]?  Call     Best call back number:.[phone] (home)

## 2020-11-23 LAB
CHOL/HDLC RATIO: 3.3
CHOLESTEROL, TOTAL: 97
CREATININE RANDOM URINE: 10 MG/DL (ref 20–320)
HBA1C MFR BLD: 9 % (ref 4–5.6)
HDLC SERPL-MCNC: 29 MG/DL
LDLC SERPL CALC-MCNC: 50 MG/DL
MICROALBUMIN URINE RANDOM: 1.3
MICROALBUMIN/CREATININE RATIO: 130 UG/MG
NONHDLC SERPL-MCNC: 68 MG/DL
TRIGL SERPL-MCNC: 93 MG/DL

## 2020-11-25 DIAGNOSIS — F33.0 MAJOR DEPRESSIVE DISORDER, RECURRENT EPISODE, MILD: ICD-10-CM

## 2020-11-26 RX ORDER — DULOXETIN HYDROCHLORIDE 30 MG/1
CAPSULE, DELAYED RELEASE ORAL
Qty: 90 CAPSULE | Refills: 1 | Status: SHIPPED | OUTPATIENT
Start: 2020-11-26 | End: 2021-05-13

## 2020-12-09 DIAGNOSIS — J44.1 COPD EXACERBATION: ICD-10-CM

## 2020-12-09 RX ORDER — ALBUTEROL SULFATE 90 UG/1
AEROSOL, METERED RESPIRATORY (INHALATION)
Qty: 18 G | Refills: 0 | Status: SHIPPED | OUTPATIENT
Start: 2020-12-09 | End: 2021-08-26 | Stop reason: SDUPTHER

## 2020-12-09 NOTE — TELEPHONE ENCOUNTER
----- Message from Irina Nunez sent at 12/9/2020  8:26 AM CST -----  Type: RX Refill Request    Who Called: Self     Have you contacted your pharmacy: no     Refill or New Rx: Refill     RX Name and Strength:albuterol (PROVENTIL/VENTOLIN HFA) 90 mcg/actuation inhaler      Preferred Pharmacy with phone number:[facility] [address] [place] [address] [address] [place] [address] [phone] (Phone)  [phone] (Fax)        Local or Mail Order: Local     Ordering Provider: Sr. Edmondson     Would the patient rather a call back or a [facility]?  Call     Best Call Back Number:.[phone] (home)

## 2020-12-14 ENCOUNTER — TELEPHONE (OUTPATIENT)
Dept: FAMILY MEDICINE | Facility: CLINIC | Age: 80
End: 2020-12-14

## 2020-12-14 NOTE — TELEPHONE ENCOUNTER
----- Message from Amy Matthews sent at 12/14/2020  8:40 AM CST -----  [facility]: [facility] [phone]  Type: RX Refill Request    [facility] [phone]    Have you contacted your pharmacy:    Refill or New Rx:albuterol (PROVENTIL/VENTOLIN HFA) 90 mcg/actuation inhaler - send directions    RX Name and Strength:    How is the patient currently taking it? (ex. 1XDay):    Is this a 30 day or 90 day RX:    Preferred Pharmacy with phone number:    Local or Mail Order:    Ordering Provider:    Would the patient rather a call back or a response via My [facility]?     Best Call Back Number:    Additional Information:

## 2020-12-14 NOTE — TELEPHONE ENCOUNTER
Spoke with Maria Antonia with [facility] pharmacy directions given for pt's inhaler.

## 2020-12-22 ENCOUNTER — PATIENT OUTREACH (OUTPATIENT)
Dept: ADMINISTRATIVE | Facility: HOSPITAL | Age: 80
End: 2020-12-22

## 2020-12-22 RX ORDER — SPIRONOLACTONE 25 MG/1
25 TABLET ORAL
COMMUNITY
Start: 2020-11-24

## 2020-12-22 RX ORDER — SACUBITRIL AND VALSARTAN 49; 51 MG/1; MG/1
1 TABLET, FILM COATED ORAL
COMMUNITY
Start: 2020-12-18 | End: 2021-02-23

## 2020-12-22 RX ORDER — NITROGLYCERIN 0.4 MG/1
TABLET SUBLINGUAL
COMMUNITY
Start: 2020-11-30 | End: 2021-05-13

## 2021-01-06 ENCOUNTER — TELEPHONE (OUTPATIENT)
Dept: FAMILY MEDICINE | Facility: CLINIC | Age: 81
End: 2021-01-06

## 2021-01-07 LAB
LEFT EYE DM RETINOPATHY: POSITIVE
RIGHT EYE DM RETINOPATHY: POSITIVE

## 2021-01-22 ENCOUNTER — PATIENT MESSAGE (OUTPATIENT)
Dept: ADMINISTRATIVE | Facility: OTHER | Age: 81
End: 2021-01-22

## 2021-01-26 ENCOUNTER — OFFICE VISIT (OUTPATIENT)
Dept: PODIATRY | Facility: CLINIC | Age: 81
End: 2021-01-26
Payer: MEDICARE

## 2021-01-26 VITALS
DIASTOLIC BLOOD PRESSURE: 72 MMHG | BODY MASS INDEX: 25.61 KG/M2 | WEIGHT: 169 LBS | HEIGHT: 68 IN | SYSTOLIC BLOOD PRESSURE: 136 MMHG

## 2021-01-26 DIAGNOSIS — M20.11 HALLUX ABDUCTO VALGUS, RIGHT: ICD-10-CM

## 2021-01-26 DIAGNOSIS — E11.49 TYPE II DIABETES MELLITUS WITH NEUROLOGICAL MANIFESTATIONS: ICD-10-CM

## 2021-01-26 DIAGNOSIS — M20.42 HAMMER TOES OF BOTH FEET: ICD-10-CM

## 2021-01-26 DIAGNOSIS — M20.41 HAMMER TOES OF BOTH FEET: ICD-10-CM

## 2021-01-26 DIAGNOSIS — B35.1 ONYCHOMYCOSIS DUE TO DERMATOPHYTE: ICD-10-CM

## 2021-01-26 DIAGNOSIS — L84 CORN OR CALLUS: ICD-10-CM

## 2021-01-26 DIAGNOSIS — M20.12 HALLUX ABDUCTO VALGUS, LEFT: ICD-10-CM

## 2021-01-26 PROCEDURE — 99499 RISK ADDL DX/OHS AUDIT: ICD-10-PCS | Mod: S$GLB,,, | Performed by: PODIATRIST

## 2021-01-26 PROCEDURE — 1157F ADVNC CARE PLAN IN RCRD: CPT | Mod: S$GLB,,, | Performed by: PODIATRIST

## 2021-01-26 PROCEDURE — 1126F PR PAIN SEVERITY QUANTIFIED, NO PAIN PRESENT: ICD-10-PCS | Mod: S$GLB,,, | Performed by: PODIATRIST

## 2021-01-26 PROCEDURE — 3288F FALL RISK ASSESSMENT DOCD: CPT | Mod: CPTII,S$GLB,, | Performed by: PODIATRIST

## 2021-01-26 PROCEDURE — 11721 DEBRIDE NAIL 6 OR MORE: CPT | Mod: 59,Q9,S$GLB, | Performed by: PODIATRIST

## 2021-01-26 PROCEDURE — 11721 PR DEBRIDEMENT OF NAILS, 6 OR MORE: ICD-10-PCS | Mod: 59,Q9,S$GLB, | Performed by: PODIATRIST

## 2021-01-26 PROCEDURE — 1157F PR ADVANCE CARE PLAN OR EQUIV PRESENT IN MEDICAL RECORD: ICD-10-PCS | Mod: S$GLB,,, | Performed by: PODIATRIST

## 2021-01-26 PROCEDURE — 1100F PR PT FALLS ASSESS DOC 2+ FALLS/FALL W/INJURY/YR: ICD-10-PCS | Mod: CPTII,S$GLB,, | Performed by: PODIATRIST

## 2021-01-26 PROCEDURE — 3288F PR FALLS RISK ASSESSMENT DOCUMENTED: ICD-10-PCS | Mod: CPTII,S$GLB,, | Performed by: PODIATRIST

## 2021-01-26 PROCEDURE — 11056 PR TRIM BENIGN HYPERKERATOTIC SKIN LESION,2-4: ICD-10-PCS | Mod: Q9,S$GLB,, | Performed by: PODIATRIST

## 2021-01-26 PROCEDURE — 99999 PR PBB SHADOW E&M-EST. PATIENT-LVL V: CPT | Mod: PBBFAC,,, | Performed by: PODIATRIST

## 2021-01-26 PROCEDURE — 1100F PTFALLS ASSESS-DOCD GE2>/YR: CPT | Mod: CPTII,S$GLB,, | Performed by: PODIATRIST

## 2021-01-26 PROCEDURE — 11056 PARNG/CUTG B9 HYPRKR LES 2-4: CPT | Mod: Q9,S$GLB,, | Performed by: PODIATRIST

## 2021-01-26 PROCEDURE — 1126F AMNT PAIN NOTED NONE PRSNT: CPT | Mod: S$GLB,,, | Performed by: PODIATRIST

## 2021-01-26 PROCEDURE — 99499 UNLISTED E&M SERVICE: CPT | Mod: S$GLB,,, | Performed by: PODIATRIST

## 2021-01-26 PROCEDURE — 99999 PR PBB SHADOW E&M-EST. PATIENT-LVL V: ICD-10-PCS | Mod: PBBFAC,,, | Performed by: PODIATRIST

## 2021-02-01 ENCOUNTER — TELEPHONE (OUTPATIENT)
Dept: FAMILY MEDICINE | Facility: CLINIC | Age: 81
End: 2021-02-01

## 2021-02-08 ENCOUNTER — TELEPHONE (OUTPATIENT)
Dept: FAMILY MEDICINE | Facility: CLINIC | Age: 81
End: 2021-02-08

## 2021-02-09 ENCOUNTER — TELEPHONE (OUTPATIENT)
Dept: FAMILY MEDICINE | Facility: CLINIC | Age: 81
End: 2021-02-09

## 2021-02-12 ENCOUNTER — LAB VISIT (OUTPATIENT)
Dept: LAB | Facility: HOSPITAL | Age: 81
End: 2021-02-12
Attending: INTERNAL MEDICINE
Payer: MEDICARE

## 2021-02-12 ENCOUNTER — CLINICAL SUPPORT (OUTPATIENT)
Dept: FAMILY MEDICINE | Facility: CLINIC | Age: 81
End: 2021-02-12
Payer: MEDICARE

## 2021-02-12 DIAGNOSIS — Z23 NEED FOR SHINGLES VACCINE: Primary | ICD-10-CM

## 2021-02-12 LAB
ALBUMIN SERPL BCP-MCNC: 3.7 G/DL (ref 3.5–5.2)
ALP SERPL-CCNC: 85 U/L (ref 55–135)
ALT SERPL W/O P-5'-P-CCNC: 13 U/L (ref 10–44)
ANION GAP SERPL CALC-SCNC: 10 MMOL/L (ref 8–16)
AST SERPL-CCNC: 11 U/L (ref 10–40)
BILIRUB SERPL-MCNC: 1.1 MG/DL (ref 0.1–1)
BUN SERPL-MCNC: 17 MG/DL (ref 8–23)
CALCIUM SERPL-MCNC: 9.2 MG/DL (ref 8.7–10.5)
CHLORIDE SERPL-SCNC: 100 MMOL/L (ref 95–110)
CO2 SERPL-SCNC: 27 MMOL/L (ref 23–29)
CREAT SERPL-MCNC: 1.6 MG/DL (ref 0.5–1.4)
EST. GFR  (AFRICAN AMERICAN): 47 ML/MIN/1.73 M^2
EST. GFR  (NON AFRICAN AMERICAN): 41 ML/MIN/1.73 M^2
ESTIMATED AVG GLUCOSE: 217 MG/DL (ref 68–131)
GLUCOSE SERPL-MCNC: 332 MG/DL (ref 70–110)
HBA1C MFR BLD: 9.2 % (ref 4–5.6)
POTASSIUM SERPL-SCNC: 5.1 MMOL/L (ref 3.5–5.1)
PROT SERPL-MCNC: 7 G/DL (ref 6–8.4)
SODIUM SERPL-SCNC: 137 MMOL/L (ref 136–145)

## 2021-02-12 PROCEDURE — 90471 ZOSTER RECOMBINANT VACCINE: ICD-10-PCS | Mod: S$GLB,,, | Performed by: INTERNAL MEDICINE

## 2021-02-12 PROCEDURE — 90750 ZOSTER RECOMBINANT VACCINE: ICD-10-PCS | Mod: S$GLB,,, | Performed by: INTERNAL MEDICINE

## 2021-02-12 PROCEDURE — 36415 COLL VENOUS BLD VENIPUNCTURE: CPT | Mod: PO

## 2021-02-12 PROCEDURE — 90471 IMMUNIZATION ADMIN: CPT | Mod: S$GLB,,, | Performed by: INTERNAL MEDICINE

## 2021-02-12 PROCEDURE — 83036 HEMOGLOBIN GLYCOSYLATED A1C: CPT

## 2021-02-12 PROCEDURE — 99499 NO LOS: ICD-10-PCS | Mod: S$GLB,,, | Performed by: INTERNAL MEDICINE

## 2021-02-12 PROCEDURE — 80053 COMPREHEN METABOLIC PANEL: CPT

## 2021-02-12 PROCEDURE — 99499 UNLISTED E&M SERVICE: CPT | Mod: S$GLB,,, | Performed by: INTERNAL MEDICINE

## 2021-02-12 PROCEDURE — 90750 HZV VACC RECOMBINANT IM: CPT | Mod: S$GLB,,, | Performed by: INTERNAL MEDICINE

## 2021-02-14 ENCOUNTER — TELEPHONE (OUTPATIENT)
Dept: FAMILY MEDICINE | Facility: CLINIC | Age: 81
End: 2021-02-14

## 2021-02-19 LAB
CHOL/HDLC RATIO: 3.6
CHOLESTEROL, TOTAL: 102
CREATININE RANDOM URINE: 72 MG/DL (ref 20–320)
HBA1C MFR BLD: 9.4 % (ref 4–5.6)
HDLC SERPL-MCNC: 26 MG/DL
LDLC SERPL CALC-MCNC: 55 MG/DL
MICROALBUMIN URINE RANDOM: 4.1
MICROALBUMIN/CREATININE RATIO: 57 UG/MG
NONHDLC SERPL-MCNC: 74 MG/DL
TRIGL SERPL-MCNC: 109 MG/DL

## 2021-02-23 DIAGNOSIS — H81.392 PERIPHERAL VERTIGO INVOLVING LEFT EAR: ICD-10-CM

## 2021-02-23 RX ORDER — SACUBITRIL AND VALSARTAN 97; 103 MG/1; MG/1
1 TABLET, FILM COATED ORAL 2 TIMES DAILY
COMMUNITY
Start: 2021-02-03

## 2021-02-23 RX ORDER — MECLIZINE HCL 12.5 MG 12.5 MG/1
12.5 TABLET ORAL 3 TIMES DAILY PRN
Qty: 90 TABLET | Refills: 0 | Status: SHIPPED | OUTPATIENT
Start: 2021-02-23 | End: 2021-05-13

## 2021-03-03 ENCOUNTER — TELEPHONE (OUTPATIENT)
Dept: FAMILY MEDICINE | Facility: CLINIC | Age: 81
End: 2021-03-03

## 2021-03-04 LAB
LEFT EYE DM RETINOPATHY: POSITIVE
RIGHT EYE DM RETINOPATHY: POSITIVE

## 2021-03-09 ENCOUNTER — TELEPHONE (OUTPATIENT)
Dept: FAMILY MEDICINE | Facility: CLINIC | Age: 81
End: 2021-03-09

## 2021-03-17 ENCOUNTER — TELEPHONE (OUTPATIENT)
Dept: FAMILY MEDICINE | Facility: CLINIC | Age: 81
End: 2021-03-17

## 2021-03-30 ENCOUNTER — TELEPHONE (OUTPATIENT)
Dept: FAMILY MEDICINE | Facility: CLINIC | Age: 81
End: 2021-03-30

## 2021-04-20 LAB
LEFT EYE DM RETINOPATHY: POSITIVE
RIGHT EYE DM RETINOPATHY: POSITIVE

## 2021-04-23 ENCOUNTER — TELEPHONE (OUTPATIENT)
Dept: FAMILY MEDICINE | Facility: CLINIC | Age: 81
End: 2021-04-23

## 2021-04-26 RX ORDER — INSULIN PUMP SYRINGE, 3 ML
EACH MISCELLANEOUS
Refills: 0 | Status: CANCELLED | OUTPATIENT
Start: 2021-04-26 | End: 2022-04-26

## 2021-04-26 RX ORDER — LANCETS
EACH MISCELLANEOUS
Qty: 200 EACH | Refills: 11 | Status: SHIPPED | OUTPATIENT
Start: 2021-04-26

## 2021-04-26 RX ORDER — LANCETS
EACH MISCELLANEOUS
Refills: 0 | Status: CANCELLED | OUTPATIENT
Start: 2021-04-26

## 2021-04-26 RX ORDER — INSULIN PUMP SYRINGE, 3 ML
EACH MISCELLANEOUS
Qty: 1 EACH | Refills: 0 | Status: SHIPPED | OUTPATIENT
Start: 2021-04-26 | End: 2023-04-12

## 2021-05-07 ENCOUNTER — OFFICE VISIT (OUTPATIENT)
Dept: FAMILY MEDICINE | Facility: CLINIC | Age: 81
End: 2021-05-07
Payer: MEDICARE

## 2021-05-07 VITALS
DIASTOLIC BLOOD PRESSURE: 80 MMHG | WEIGHT: 175.94 LBS | OXYGEN SATURATION: 96 % | HEIGHT: 68 IN | SYSTOLIC BLOOD PRESSURE: 116 MMHG | TEMPERATURE: 98 F | BODY MASS INDEX: 26.66 KG/M2 | HEART RATE: 75 BPM

## 2021-05-07 DIAGNOSIS — R42 DIZZINESS: Primary | ICD-10-CM

## 2021-05-07 PROCEDURE — 1157F PR ADVANCE CARE PLAN OR EQUIV PRESENT IN MEDICAL RECORD: ICD-10-PCS | Mod: S$GLB,,, | Performed by: FAMILY MEDICINE

## 2021-05-07 PROCEDURE — 99213 PR OFFICE/OUTPT VISIT, EST, LEVL III, 20-29 MIN: ICD-10-PCS | Mod: S$GLB,,, | Performed by: FAMILY MEDICINE

## 2021-05-07 PROCEDURE — 1101F PT FALLS ASSESS-DOCD LE1/YR: CPT | Mod: CPTII,S$GLB,, | Performed by: FAMILY MEDICINE

## 2021-05-07 PROCEDURE — 1126F PR PAIN SEVERITY QUANTIFIED, NO PAIN PRESENT: ICD-10-PCS | Mod: S$GLB,,, | Performed by: FAMILY MEDICINE

## 2021-05-07 PROCEDURE — 3288F PR FALLS RISK ASSESSMENT DOCUMENTED: ICD-10-PCS | Mod: CPTII,S$GLB,, | Performed by: FAMILY MEDICINE

## 2021-05-07 PROCEDURE — 99213 OFFICE O/P EST LOW 20 MIN: CPT | Mod: S$GLB,,, | Performed by: FAMILY MEDICINE

## 2021-05-07 PROCEDURE — 3288F FALL RISK ASSESSMENT DOCD: CPT | Mod: CPTII,S$GLB,, | Performed by: FAMILY MEDICINE

## 2021-05-07 PROCEDURE — 1126F AMNT PAIN NOTED NONE PRSNT: CPT | Mod: S$GLB,,, | Performed by: FAMILY MEDICINE

## 2021-05-07 PROCEDURE — 1159F MED LIST DOCD IN RCRD: CPT | Mod: S$GLB,,, | Performed by: FAMILY MEDICINE

## 2021-05-07 PROCEDURE — 1101F PR PT FALLS ASSESS DOC 0-1 FALLS W/OUT INJ PAST YR: ICD-10-PCS | Mod: CPTII,S$GLB,, | Performed by: FAMILY MEDICINE

## 2021-05-07 PROCEDURE — 99999 PR PBB SHADOW E&M-EST. PATIENT-LVL V: CPT | Mod: PBBFAC,,, | Performed by: FAMILY MEDICINE

## 2021-05-07 PROCEDURE — 1159F PR MEDICATION LIST DOCUMENTED IN MEDICAL RECORD: ICD-10-PCS | Mod: S$GLB,,, | Performed by: FAMILY MEDICINE

## 2021-05-07 PROCEDURE — 99999 PR PBB SHADOW E&M-EST. PATIENT-LVL V: ICD-10-PCS | Mod: PBBFAC,,, | Performed by: FAMILY MEDICINE

## 2021-05-07 PROCEDURE — 1157F ADVNC CARE PLAN IN RCRD: CPT | Mod: S$GLB,,, | Performed by: FAMILY MEDICINE

## 2021-05-07 RX ORDER — WARFARIN SODIUM 5 MG/1
TABLET ORAL
COMMUNITY
Start: 2021-05-04 | End: 2021-05-13

## 2021-05-07 RX ORDER — ATORVASTATIN CALCIUM 40 MG/1
40 TABLET, FILM COATED ORAL
COMMUNITY
Start: 2021-02-15 | End: 2021-05-13

## 2021-05-07 RX ORDER — CARVEDILOL 25 MG/1
25 TABLET ORAL
COMMUNITY
Start: 2021-02-15 | End: 2021-05-13

## 2021-05-07 RX ORDER — FUROSEMIDE 40 MG/1
40 TABLET ORAL
COMMUNITY
Start: 2021-03-25 | End: 2021-05-13

## 2021-05-07 RX ORDER — CLOPIDOGREL BISULFATE 75 MG/1
75 TABLET ORAL
COMMUNITY
Start: 2021-02-15

## 2021-05-07 RX ORDER — METOCLOPRAMIDE 5 MG/1
5 TABLET ORAL 3 TIMES DAILY PRN
Qty: 30 TABLET | Refills: 1 | Status: SHIPPED | OUTPATIENT
Start: 2021-05-07 | End: 2021-05-13

## 2021-05-07 RX ORDER — NITROGLYCERIN 0.4 MG/1
TABLET SUBLINGUAL
COMMUNITY
Start: 2021-02-22

## 2021-05-10 ENCOUNTER — TELEPHONE (OUTPATIENT)
Dept: FAMILY MEDICINE | Facility: CLINIC | Age: 81
End: 2021-05-10

## 2021-05-10 DIAGNOSIS — R42 DIZZINESS: ICD-10-CM

## 2021-05-12 ENCOUNTER — TELEPHONE (OUTPATIENT)
Dept: PSYCHIATRY | Facility: CLINIC | Age: 81
End: 2021-05-12

## 2021-05-13 ENCOUNTER — OFFICE VISIT (OUTPATIENT)
Dept: PSYCHIATRY | Facility: CLINIC | Age: 81
End: 2021-05-13
Payer: COMMERCIAL

## 2021-05-13 VITALS
HEART RATE: 76 BPM | SYSTOLIC BLOOD PRESSURE: 120 MMHG | BODY MASS INDEX: 27.06 KG/M2 | DIASTOLIC BLOOD PRESSURE: 87 MMHG | WEIGHT: 178.56 LBS | OXYGEN SATURATION: 96 % | HEIGHT: 68 IN

## 2021-05-13 DIAGNOSIS — G31.84 MCI (MILD COGNITIVE IMPAIRMENT): ICD-10-CM

## 2021-05-13 DIAGNOSIS — F33.0 MAJOR DEPRESSIVE DISORDER, RECURRENT EPISODE, MILD: Primary | ICD-10-CM

## 2021-05-13 PROCEDURE — 3288F FALL RISK ASSESSMENT DOCD: CPT | Mod: CPTII,S$GLB,, | Performed by: PHYSICIAN ASSISTANT

## 2021-05-13 PROCEDURE — 99999 PR PBB SHADOW E&M-EST. PATIENT-LVL IV: CPT | Mod: PBBFAC,,, | Performed by: PHYSICIAN ASSISTANT

## 2021-05-13 PROCEDURE — 1126F PR PAIN SEVERITY QUANTIFIED, NO PAIN PRESENT: ICD-10-PCS | Mod: S$GLB,,, | Performed by: PHYSICIAN ASSISTANT

## 2021-05-13 PROCEDURE — 1157F PR ADVANCE CARE PLAN OR EQUIV PRESENT IN MEDICAL RECORD: ICD-10-PCS | Mod: S$GLB,,, | Performed by: PHYSICIAN ASSISTANT

## 2021-05-13 PROCEDURE — 99499 UNLISTED E&M SERVICE: CPT | Mod: S$GLB,,, | Performed by: PHYSICIAN ASSISTANT

## 2021-05-13 PROCEDURE — 1159F PR MEDICATION LIST DOCUMENTED IN MEDICAL RECORD: ICD-10-PCS | Mod: S$GLB,,, | Performed by: PHYSICIAN ASSISTANT

## 2021-05-13 PROCEDURE — 99999 PR PBB SHADOW E&M-EST. PATIENT-LVL IV: ICD-10-PCS | Mod: PBBFAC,,, | Performed by: PHYSICIAN ASSISTANT

## 2021-05-13 PROCEDURE — 1126F AMNT PAIN NOTED NONE PRSNT: CPT | Mod: S$GLB,,, | Performed by: PHYSICIAN ASSISTANT

## 2021-05-13 PROCEDURE — 99499 RISK ADDL DX/OHS AUDIT: ICD-10-PCS | Mod: S$GLB,,, | Performed by: PHYSICIAN ASSISTANT

## 2021-05-13 PROCEDURE — 1101F PT FALLS ASSESS-DOCD LE1/YR: CPT | Mod: CPTII,S$GLB,, | Performed by: PHYSICIAN ASSISTANT

## 2021-05-13 PROCEDURE — 3288F PR FALLS RISK ASSESSMENT DOCUMENTED: ICD-10-PCS | Mod: CPTII,S$GLB,, | Performed by: PHYSICIAN ASSISTANT

## 2021-05-13 PROCEDURE — 1101F PR PT FALLS ASSESS DOC 0-1 FALLS W/OUT INJ PAST YR: ICD-10-PCS | Mod: CPTII,S$GLB,, | Performed by: PHYSICIAN ASSISTANT

## 2021-05-13 PROCEDURE — 1157F ADVNC CARE PLAN IN RCRD: CPT | Mod: S$GLB,,, | Performed by: PHYSICIAN ASSISTANT

## 2021-05-13 PROCEDURE — 99204 OFFICE O/P NEW MOD 45 MIN: CPT | Mod: S$GLB,,, | Performed by: PHYSICIAN ASSISTANT

## 2021-05-13 PROCEDURE — 99204 PR OFFICE/OUTPT VISIT, NEW, LEVL IV, 45-59 MIN: ICD-10-PCS | Mod: S$GLB,,, | Performed by: PHYSICIAN ASSISTANT

## 2021-05-13 PROCEDURE — 1159F MED LIST DOCD IN RCRD: CPT | Mod: S$GLB,,, | Performed by: PHYSICIAN ASSISTANT

## 2021-05-13 RX ORDER — DULOXETIN HYDROCHLORIDE 60 MG/1
60 CAPSULE, DELAYED RELEASE ORAL DAILY
Qty: 90 CAPSULE | Refills: 3 | Status: SHIPPED | OUTPATIENT
Start: 2021-05-13 | End: 2023-03-17 | Stop reason: SDUPTHER

## 2021-05-14 ENCOUNTER — TELEPHONE (OUTPATIENT)
Dept: PSYCHIATRY | Facility: CLINIC | Age: 81
End: 2021-05-14

## 2021-05-26 ENCOUNTER — OFFICE VISIT (OUTPATIENT)
Dept: PODIATRY | Facility: CLINIC | Age: 81
End: 2021-05-26
Payer: MEDICARE

## 2021-05-26 VITALS — WEIGHT: 178.56 LBS | HEIGHT: 68 IN | BODY MASS INDEX: 27.06 KG/M2

## 2021-05-26 DIAGNOSIS — R20.2 PARESTHESIA OF BOTH FEET: ICD-10-CM

## 2021-05-26 DIAGNOSIS — M20.11 HALLUX ABDUCTO VALGUS, RIGHT: ICD-10-CM

## 2021-05-26 DIAGNOSIS — E11.49 TYPE II DIABETES MELLITUS WITH NEUROLOGICAL MANIFESTATIONS: ICD-10-CM

## 2021-05-26 DIAGNOSIS — M20.12 HALLUX ABDUCTO VALGUS, LEFT: ICD-10-CM

## 2021-05-26 DIAGNOSIS — M20.42 HAMMER TOES OF BOTH FEET: ICD-10-CM

## 2021-05-26 DIAGNOSIS — B35.1 ONYCHOMYCOSIS DUE TO DERMATOPHYTE: ICD-10-CM

## 2021-05-26 DIAGNOSIS — L84 CORN OR CALLUS: ICD-10-CM

## 2021-05-26 DIAGNOSIS — M20.41 HAMMER TOES OF BOTH FEET: ICD-10-CM

## 2021-05-26 PROCEDURE — 1101F PT FALLS ASSESS-DOCD LE1/YR: CPT | Mod: CPTII,S$GLB,, | Performed by: PODIATRIST

## 2021-05-26 PROCEDURE — 99999 PR PBB SHADOW E&M-EST. PATIENT-LVL IV: CPT | Mod: PBBFAC,,, | Performed by: PODIATRIST

## 2021-05-26 PROCEDURE — 1101F PR PT FALLS ASSESS DOC 0-1 FALLS W/OUT INJ PAST YR: ICD-10-PCS | Mod: CPTII,S$GLB,, | Performed by: PODIATRIST

## 2021-05-26 PROCEDURE — 99999 PR PBB SHADOW E&M-EST. PATIENT-LVL IV: ICD-10-PCS | Mod: PBBFAC,,, | Performed by: PODIATRIST

## 2021-05-26 PROCEDURE — 11056 PARNG/CUTG B9 HYPRKR LES 2-4: CPT | Mod: Q9,S$GLB,, | Performed by: PODIATRIST

## 2021-05-26 PROCEDURE — 11721 DEBRIDE NAIL 6 OR MORE: CPT | Mod: 59,Q9,S$GLB, | Performed by: PODIATRIST

## 2021-05-26 PROCEDURE — 3288F PR FALLS RISK ASSESSMENT DOCUMENTED: ICD-10-PCS | Mod: CPTII,S$GLB,, | Performed by: PODIATRIST

## 2021-05-26 PROCEDURE — 11056 PR TRIM BENIGN HYPERKERATOTIC SKIN LESION,2-4: ICD-10-PCS | Mod: Q9,S$GLB,, | Performed by: PODIATRIST

## 2021-05-26 PROCEDURE — 1157F ADVNC CARE PLAN IN RCRD: CPT | Mod: S$GLB,,, | Performed by: PODIATRIST

## 2021-05-26 PROCEDURE — 1157F PR ADVANCE CARE PLAN OR EQUIV PRESENT IN MEDICAL RECORD: ICD-10-PCS | Mod: S$GLB,,, | Performed by: PODIATRIST

## 2021-05-26 PROCEDURE — 11721 PR DEBRIDEMENT OF NAILS, 6 OR MORE: ICD-10-PCS | Mod: 59,Q9,S$GLB, | Performed by: PODIATRIST

## 2021-05-26 PROCEDURE — 99499 NO LOS: ICD-10-PCS | Mod: S$GLB,,, | Performed by: PODIATRIST

## 2021-05-26 PROCEDURE — 3288F FALL RISK ASSESSMENT DOCD: CPT | Mod: CPTII,S$GLB,, | Performed by: PODIATRIST

## 2021-05-26 PROCEDURE — 99499 UNLISTED E&M SERVICE: CPT | Mod: S$GLB,,, | Performed by: PODIATRIST

## 2021-06-01 LAB
CREATININE RANDOM URINE: 73 MG/DL (ref 20–320)
HBA1C MFR BLD: 8.2 % (ref 4–5.6)
MICROALBUMIN URINE RANDOM: 6.8
MICROALBUMIN/CREATININE RATIO: 93 UG/MG

## 2021-06-08 ENCOUNTER — PATIENT OUTREACH (OUTPATIENT)
Dept: ADMINISTRATIVE | Facility: HOSPITAL | Age: 81
End: 2021-06-08

## 2021-06-08 ENCOUNTER — TELEPHONE (OUTPATIENT)
Dept: FAMILY MEDICINE | Facility: CLINIC | Age: 81
End: 2021-06-08

## 2021-06-08 LAB
CHOL/HDLC RATIO: 3.8
CHOLESTEROL, TOTAL: 113
HDLC SERPL-MCNC: 30 MG/DL
LDLC SERPL CALC-MCNC: 66 MG/DL
NONHDLC SERPL-MCNC: 83 MG/DL
TRIGL SERPL-MCNC: 88 MG/DL

## 2021-06-14 ENCOUNTER — OFFICE VISIT (OUTPATIENT)
Dept: PSYCHIATRY | Facility: CLINIC | Age: 81
End: 2021-06-14
Payer: COMMERCIAL

## 2021-06-14 VITALS
WEIGHT: 177.94 LBS | DIASTOLIC BLOOD PRESSURE: 80 MMHG | BODY MASS INDEX: 27.05 KG/M2 | OXYGEN SATURATION: 94 % | HEART RATE: 74 BPM | SYSTOLIC BLOOD PRESSURE: 140 MMHG

## 2021-06-14 DIAGNOSIS — F33.0 MAJOR DEPRESSIVE DISORDER, RECURRENT EPISODE, MILD: Primary | ICD-10-CM

## 2021-06-14 DIAGNOSIS — G31.84 MCI (MILD COGNITIVE IMPAIRMENT): ICD-10-CM

## 2021-06-14 PROCEDURE — 90833 PR PSYCHOTHERAPY W/PATIENT W/E&M, 30 MIN (ADD ON): ICD-10-PCS | Mod: S$GLB,,, | Performed by: PHYSICIAN ASSISTANT

## 2021-06-14 PROCEDURE — 3288F PR FALLS RISK ASSESSMENT DOCUMENTED: ICD-10-PCS | Mod: CPTII,S$GLB,, | Performed by: PHYSICIAN ASSISTANT

## 2021-06-14 PROCEDURE — 1126F AMNT PAIN NOTED NONE PRSNT: CPT | Mod: S$GLB,,, | Performed by: PHYSICIAN ASSISTANT

## 2021-06-14 PROCEDURE — 99999 PR PBB SHADOW E&M-EST. PATIENT-LVL IV: CPT | Mod: PBBFAC,,, | Performed by: PHYSICIAN ASSISTANT

## 2021-06-14 PROCEDURE — 1101F PT FALLS ASSESS-DOCD LE1/YR: CPT | Mod: CPTII,S$GLB,, | Performed by: PHYSICIAN ASSISTANT

## 2021-06-14 PROCEDURE — 99999 PR PBB SHADOW E&M-EST. PATIENT-LVL IV: ICD-10-PCS | Mod: PBBFAC,,, | Performed by: PHYSICIAN ASSISTANT

## 2021-06-14 PROCEDURE — 1126F PR PAIN SEVERITY QUANTIFIED, NO PAIN PRESENT: ICD-10-PCS | Mod: S$GLB,,, | Performed by: PHYSICIAN ASSISTANT

## 2021-06-14 PROCEDURE — 99214 OFFICE O/P EST MOD 30 MIN: CPT | Mod: S$GLB,,, | Performed by: PHYSICIAN ASSISTANT

## 2021-06-14 PROCEDURE — 1159F MED LIST DOCD IN RCRD: CPT | Mod: S$GLB,,, | Performed by: PHYSICIAN ASSISTANT

## 2021-06-14 PROCEDURE — 1157F PR ADVANCE CARE PLAN OR EQUIV PRESENT IN MEDICAL RECORD: ICD-10-PCS | Mod: S$GLB,,, | Performed by: PHYSICIAN ASSISTANT

## 2021-06-14 PROCEDURE — 1159F PR MEDICATION LIST DOCUMENTED IN MEDICAL RECORD: ICD-10-PCS | Mod: S$GLB,,, | Performed by: PHYSICIAN ASSISTANT

## 2021-06-14 PROCEDURE — 3288F FALL RISK ASSESSMENT DOCD: CPT | Mod: CPTII,S$GLB,, | Performed by: PHYSICIAN ASSISTANT

## 2021-06-14 PROCEDURE — 99214 PR OFFICE/OUTPT VISIT, EST, LEVL IV, 30-39 MIN: ICD-10-PCS | Mod: S$GLB,,, | Performed by: PHYSICIAN ASSISTANT

## 2021-06-14 PROCEDURE — 1101F PR PT FALLS ASSESS DOC 0-1 FALLS W/OUT INJ PAST YR: ICD-10-PCS | Mod: CPTII,S$GLB,, | Performed by: PHYSICIAN ASSISTANT

## 2021-06-14 PROCEDURE — 1157F ADVNC CARE PLAN IN RCRD: CPT | Mod: S$GLB,,, | Performed by: PHYSICIAN ASSISTANT

## 2021-06-14 PROCEDURE — 90833 PSYTX W PT W E/M 30 MIN: CPT | Mod: S$GLB,,, | Performed by: PHYSICIAN ASSISTANT

## 2021-06-21 ENCOUNTER — TELEPHONE (OUTPATIENT)
Dept: PSYCHIATRY | Facility: CLINIC | Age: 81
End: 2021-06-21

## 2021-06-22 ENCOUNTER — PATIENT OUTREACH (OUTPATIENT)
Dept: ADMINISTRATIVE | Facility: HOSPITAL | Age: 81
End: 2021-06-22

## 2021-06-22 RX ORDER — METOCLOPRAMIDE 5 MG/1
5 TABLET ORAL 3 TIMES DAILY PRN
COMMUNITY
Start: 2021-06-18 | End: 2023-06-02

## 2021-06-29 ENCOUNTER — TELEPHONE (OUTPATIENT)
Dept: FAMILY MEDICINE | Facility: CLINIC | Age: 81
End: 2021-06-29

## 2021-07-07 ENCOUNTER — OFFICE VISIT (OUTPATIENT)
Dept: FAMILY MEDICINE | Facility: CLINIC | Age: 81
End: 2021-07-07
Payer: MEDICARE

## 2021-07-07 VITALS
DIASTOLIC BLOOD PRESSURE: 66 MMHG | BODY MASS INDEX: 26.28 KG/M2 | WEIGHT: 173.38 LBS | TEMPERATURE: 98 F | HEIGHT: 68 IN | OXYGEN SATURATION: 94 % | SYSTOLIC BLOOD PRESSURE: 112 MMHG | HEART RATE: 70 BPM

## 2021-07-07 DIAGNOSIS — E66.3 OVERWEIGHT (BMI 25.0-29.9): ICD-10-CM

## 2021-07-07 DIAGNOSIS — I20.89 CHRONIC STABLE ANGINA: ICD-10-CM

## 2021-07-07 DIAGNOSIS — N25.81 SECONDARY HYPERPARATHYROIDISM OF RENAL ORIGIN: ICD-10-CM

## 2021-07-07 DIAGNOSIS — E78.5 HYPERLIPIDEMIA LDL GOAL <100: ICD-10-CM

## 2021-07-07 DIAGNOSIS — D69.2 SENILE PURPURA: ICD-10-CM

## 2021-07-07 DIAGNOSIS — G89.29 CHRONIC MIDLINE LOW BACK PAIN WITHOUT SCIATICA: ICD-10-CM

## 2021-07-07 DIAGNOSIS — Z99.89 AMBULATES WITH CANE: ICD-10-CM

## 2021-07-07 DIAGNOSIS — J84.9 ILD (INTERSTITIAL LUNG DISEASE): ICD-10-CM

## 2021-07-07 DIAGNOSIS — G47.33 OBSTRUCTIVE SLEEP APNEA TREATED WITH BIPAP: ICD-10-CM

## 2021-07-07 DIAGNOSIS — H35.3230 BILATERAL EXUDATIVE AGE-RELATED MACULAR DEGENERATION, UNSPECIFIED STAGE: ICD-10-CM

## 2021-07-07 DIAGNOSIS — I50.42 CHRONIC COMBINED SYSTOLIC AND DIASTOLIC HEART FAILURE: ICD-10-CM

## 2021-07-07 DIAGNOSIS — Z99.81 CHRONIC RESPIRATORY FAILURE WITH HYPOXIA, ON HOME O2 THERAPY: ICD-10-CM

## 2021-07-07 DIAGNOSIS — M54.50 CHRONIC MIDLINE LOW BACK PAIN WITHOUT SCIATICA: ICD-10-CM

## 2021-07-07 DIAGNOSIS — Z71.89 ADVANCED DIRECTIVES, COUNSELING/DISCUSSION: ICD-10-CM

## 2021-07-07 DIAGNOSIS — F33.0 MAJOR DEPRESSIVE DISORDER, RECURRENT EPISODE, MILD: ICD-10-CM

## 2021-07-07 DIAGNOSIS — R42 VERTIGO: ICD-10-CM

## 2021-07-07 DIAGNOSIS — J96.11 CHRONIC RESPIRATORY FAILURE WITH HYPOXIA, ON HOME O2 THERAPY: ICD-10-CM

## 2021-07-07 DIAGNOSIS — I70.0 ATHEROSCLEROSIS OF ABDOMINAL AORTA: ICD-10-CM

## 2021-07-07 DIAGNOSIS — I10 ESSENTIAL HYPERTENSION: ICD-10-CM

## 2021-07-07 DIAGNOSIS — F41.1 ANXIETY STATE: ICD-10-CM

## 2021-07-07 DIAGNOSIS — Z79.4 INSULIN LONG-TERM USE: ICD-10-CM

## 2021-07-07 DIAGNOSIS — I48.19 PERSISTENT ATRIAL FIBRILLATION: ICD-10-CM

## 2021-07-07 DIAGNOSIS — I25.10 CORONARY ARTERY DISEASE, ANGINA PRESENCE UNSPECIFIED, UNSPECIFIED VESSEL OR LESION TYPE, UNSPECIFIED WHETHER NATIVE OR TRANSPLANTED HEART: ICD-10-CM

## 2021-07-07 DIAGNOSIS — G31.84 MCI (MILD COGNITIVE IMPAIRMENT): ICD-10-CM

## 2021-07-07 PROCEDURE — 1101F PR PT FALLS ASSESS DOC 0-1 FALLS W/OUT INJ PAST YR: ICD-10-PCS | Mod: CPTII,S$GLB,, | Performed by: INTERNAL MEDICINE

## 2021-07-07 PROCEDURE — 99214 PR OFFICE/OUTPT VISIT, EST, LEVL IV, 30-39 MIN: ICD-10-PCS | Mod: S$GLB,,, | Performed by: INTERNAL MEDICINE

## 2021-07-07 PROCEDURE — 99999 PR PBB SHADOW E&M-EST. PATIENT-LVL V: CPT | Mod: PBBFAC,,, | Performed by: INTERNAL MEDICINE

## 2021-07-07 PROCEDURE — 99214 OFFICE O/P EST MOD 30 MIN: CPT | Mod: S$GLB,,, | Performed by: INTERNAL MEDICINE

## 2021-07-07 PROCEDURE — 3288F FALL RISK ASSESSMENT DOCD: CPT | Mod: CPTII,S$GLB,, | Performed by: INTERNAL MEDICINE

## 2021-07-07 PROCEDURE — 1159F PR MEDICATION LIST DOCUMENTED IN MEDICAL RECORD: ICD-10-PCS | Mod: S$GLB,,, | Performed by: INTERNAL MEDICINE

## 2021-07-07 PROCEDURE — 1125F PR PAIN SEVERITY QUANTIFIED, PAIN PRESENT: ICD-10-PCS | Mod: S$GLB,,, | Performed by: INTERNAL MEDICINE

## 2021-07-07 PROCEDURE — 3052F PR MOST RECENT HEMOGLOBIN A1C LEVEL 8.0 - < 9.0%: ICD-10-PCS | Mod: CPTII,S$GLB,, | Performed by: INTERNAL MEDICINE

## 2021-07-07 PROCEDURE — 1157F PR ADVANCE CARE PLAN OR EQUIV PRESENT IN MEDICAL RECORD: ICD-10-PCS | Mod: S$GLB,,, | Performed by: INTERNAL MEDICINE

## 2021-07-07 PROCEDURE — 3288F PR FALLS RISK ASSESSMENT DOCUMENTED: ICD-10-PCS | Mod: CPTII,S$GLB,, | Performed by: INTERNAL MEDICINE

## 2021-07-07 PROCEDURE — 1159F MED LIST DOCD IN RCRD: CPT | Mod: S$GLB,,, | Performed by: INTERNAL MEDICINE

## 2021-07-07 PROCEDURE — 99999 PR PBB SHADOW E&M-EST. PATIENT-LVL V: ICD-10-PCS | Mod: PBBFAC,,, | Performed by: INTERNAL MEDICINE

## 2021-07-07 PROCEDURE — 99499 RISK ADDL DX/OHS AUDIT: ICD-10-PCS | Mod: S$GLB,,, | Performed by: INTERNAL MEDICINE

## 2021-07-07 PROCEDURE — 1157F ADVNC CARE PLAN IN RCRD: CPT | Mod: S$GLB,,, | Performed by: INTERNAL MEDICINE

## 2021-07-07 PROCEDURE — 99499 UNLISTED E&M SERVICE: CPT | Mod: S$GLB,,, | Performed by: INTERNAL MEDICINE

## 2021-07-07 PROCEDURE — 3052F HG A1C>EQUAL 8.0%<EQUAL 9.0%: CPT | Mod: CPTII,S$GLB,, | Performed by: INTERNAL MEDICINE

## 2021-07-07 PROCEDURE — 1125F AMNT PAIN NOTED PAIN PRSNT: CPT | Mod: S$GLB,,, | Performed by: INTERNAL MEDICINE

## 2021-07-07 PROCEDURE — 1101F PT FALLS ASSESS-DOCD LE1/YR: CPT | Mod: CPTII,S$GLB,, | Performed by: INTERNAL MEDICINE

## 2021-07-07 RX ORDER — DULOXETIN HYDROCHLORIDE 30 MG/1
60 CAPSULE, DELAYED RELEASE ORAL
COMMUNITY
End: 2021-07-14

## 2021-07-07 RX ORDER — METOCLOPRAMIDE 5 MG/1
5 TABLET ORAL 3 TIMES DAILY PRN
Status: CANCELLED | OUTPATIENT
Start: 2021-07-07

## 2021-07-07 RX ORDER — SPIRONOLACTONE 25 MG/1
12.5 TABLET ORAL
COMMUNITY
Start: 2021-06-24 | End: 2022-04-04 | Stop reason: SDUPTHER

## 2021-07-07 RX ORDER — CLOPIDOGREL BISULFATE 75 MG/1
1 TABLET ORAL DAILY
COMMUNITY
Start: 2021-02-15 | End: 2021-07-11 | Stop reason: SDUPTHER

## 2021-07-12 ENCOUNTER — OFFICE VISIT (OUTPATIENT)
Dept: OTOLARYNGOLOGY | Facility: CLINIC | Age: 81
End: 2021-07-12
Payer: MEDICARE

## 2021-07-12 ENCOUNTER — CLINICAL SUPPORT (OUTPATIENT)
Dept: AUDIOLOGY | Facility: CLINIC | Age: 81
End: 2021-07-12
Payer: MEDICARE

## 2021-07-12 VITALS
SYSTOLIC BLOOD PRESSURE: 112 MMHG | HEIGHT: 68 IN | BODY MASS INDEX: 26.3 KG/M2 | WEIGHT: 173.5 LBS | DIASTOLIC BLOOD PRESSURE: 60 MMHG | TEMPERATURE: 98 F

## 2021-07-12 DIAGNOSIS — R42 DIZZINESS: ICD-10-CM

## 2021-07-12 DIAGNOSIS — H90.3 SENSORINEURAL HEARING LOSS (SNHL) OF BOTH EARS: ICD-10-CM

## 2021-07-12 DIAGNOSIS — H93.13 TINNITUS OF BOTH EARS: ICD-10-CM

## 2021-07-12 DIAGNOSIS — H69.91 DYSFUNCTION OF RIGHT EUSTACHIAN TUBE: Primary | ICD-10-CM

## 2021-07-12 DIAGNOSIS — H90.3 SENSORINEURAL HEARING LOSS, BILATERAL: ICD-10-CM

## 2021-07-12 DIAGNOSIS — R42 DIZZINESS AND GIDDINESS: Primary | ICD-10-CM

## 2021-07-12 DIAGNOSIS — J34.3 HYPERTROPHY OF INFERIOR NASAL TURBINATE: ICD-10-CM

## 2021-07-12 PROCEDURE — 1159F MED LIST DOCD IN RCRD: CPT | Mod: S$GLB,,, | Performed by: OTOLARYNGOLOGY

## 2021-07-12 PROCEDURE — 92550 TYMPANOMETRY & REFLEX THRESH: CPT | Mod: S$GLB,,, | Performed by: AUDIOLOGIST

## 2021-07-12 PROCEDURE — 1126F PR PAIN SEVERITY QUANTIFIED, NO PAIN PRESENT: ICD-10-PCS | Mod: S$GLB,,, | Performed by: OTOLARYNGOLOGY

## 2021-07-12 PROCEDURE — 99215 OFFICE O/P EST HI 40 MIN: CPT | Mod: 25,S$GLB,, | Performed by: OTOLARYNGOLOGY

## 2021-07-12 PROCEDURE — 92504 EAR MICROSCOPY EXAMINATION: CPT | Mod: S$GLB,,, | Performed by: OTOLARYNGOLOGY

## 2021-07-12 PROCEDURE — 99215 PR OFFICE/OUTPT VISIT, EST, LEVL V, 40-54 MIN: ICD-10-PCS | Mod: 25,S$GLB,, | Performed by: OTOLARYNGOLOGY

## 2021-07-12 PROCEDURE — 92504 PR EAR MICROSCOPY EXAMINATION: ICD-10-PCS | Mod: S$GLB,,, | Performed by: OTOLARYNGOLOGY

## 2021-07-12 PROCEDURE — 99499 RISK ADDL DX/OHS AUDIT: ICD-10-PCS | Mod: S$GLB,,, | Performed by: OTOLARYNGOLOGY

## 2021-07-12 PROCEDURE — 92557 PR COMPREHENSIVE HEARING TEST: ICD-10-PCS | Mod: S$GLB,,, | Performed by: AUDIOLOGIST

## 2021-07-12 PROCEDURE — 92550 PR TYMPANOMETRY AND REFLEX THRESHOLD MEASUREMENTS: ICD-10-PCS | Mod: S$GLB,,, | Performed by: AUDIOLOGIST

## 2021-07-12 PROCEDURE — 3288F FALL RISK ASSESSMENT DOCD: CPT | Mod: CPTII,S$GLB,, | Performed by: OTOLARYNGOLOGY

## 2021-07-12 PROCEDURE — 1157F PR ADVANCE CARE PLAN OR EQUIV PRESENT IN MEDICAL RECORD: ICD-10-PCS | Mod: S$GLB,,, | Performed by: OTOLARYNGOLOGY

## 2021-07-12 PROCEDURE — 3288F PR FALLS RISK ASSESSMENT DOCUMENTED: ICD-10-PCS | Mod: CPTII,S$GLB,, | Performed by: OTOLARYNGOLOGY

## 2021-07-12 PROCEDURE — 1101F PT FALLS ASSESS-DOCD LE1/YR: CPT | Mod: CPTII,S$GLB,, | Performed by: OTOLARYNGOLOGY

## 2021-07-12 PROCEDURE — 99499 UNLISTED E&M SERVICE: CPT | Mod: S$GLB,,, | Performed by: OTOLARYNGOLOGY

## 2021-07-12 PROCEDURE — 1159F PR MEDICATION LIST DOCUMENTED IN MEDICAL RECORD: ICD-10-PCS | Mod: S$GLB,,, | Performed by: OTOLARYNGOLOGY

## 2021-07-12 PROCEDURE — 1126F AMNT PAIN NOTED NONE PRSNT: CPT | Mod: S$GLB,,, | Performed by: OTOLARYNGOLOGY

## 2021-07-12 PROCEDURE — 92557 COMPREHENSIVE HEARING TEST: CPT | Mod: S$GLB,,, | Performed by: AUDIOLOGIST

## 2021-07-12 PROCEDURE — 1101F PR PT FALLS ASSESS DOC 0-1 FALLS W/OUT INJ PAST YR: ICD-10-PCS | Mod: CPTII,S$GLB,, | Performed by: OTOLARYNGOLOGY

## 2021-07-12 PROCEDURE — 1157F ADVNC CARE PLAN IN RCRD: CPT | Mod: S$GLB,,, | Performed by: OTOLARYNGOLOGY

## 2021-07-12 RX ORDER — FLUTICASONE PROPIONATE 50 MCG
1 SPRAY, SUSPENSION (ML) NASAL 2 TIMES DAILY
Qty: 18.2 ML | Refills: 3 | Status: SHIPPED | OUTPATIENT
Start: 2021-07-12

## 2021-07-14 ENCOUNTER — OFFICE VISIT (OUTPATIENT)
Dept: PSYCHIATRY | Facility: CLINIC | Age: 81
End: 2021-07-14
Payer: COMMERCIAL

## 2021-07-14 ENCOUNTER — TELEPHONE (OUTPATIENT)
Dept: PSYCHIATRY | Facility: CLINIC | Age: 81
End: 2021-07-14

## 2021-07-14 VITALS
HEIGHT: 68 IN | DIASTOLIC BLOOD PRESSURE: 74 MMHG | SYSTOLIC BLOOD PRESSURE: 120 MMHG | BODY MASS INDEX: 27.01 KG/M2 | OXYGEN SATURATION: 93 % | HEART RATE: 55 BPM | WEIGHT: 178.25 LBS

## 2021-07-14 DIAGNOSIS — G31.84 MCI (MILD COGNITIVE IMPAIRMENT): ICD-10-CM

## 2021-07-14 DIAGNOSIS — F33.0 MAJOR DEPRESSIVE DISORDER, RECURRENT EPISODE, MILD: Primary | ICD-10-CM

## 2021-07-14 PROCEDURE — 99214 OFFICE O/P EST MOD 30 MIN: CPT | Mod: S$GLB,,, | Performed by: PHYSICIAN ASSISTANT

## 2021-07-14 PROCEDURE — 1101F PT FALLS ASSESS-DOCD LE1/YR: CPT | Mod: CPTII,S$GLB,, | Performed by: PHYSICIAN ASSISTANT

## 2021-07-14 PROCEDURE — 99214 PR OFFICE/OUTPT VISIT, EST, LEVL IV, 30-39 MIN: ICD-10-PCS | Mod: S$GLB,,, | Performed by: PHYSICIAN ASSISTANT

## 2021-07-14 PROCEDURE — 1157F PR ADVANCE CARE PLAN OR EQUIV PRESENT IN MEDICAL RECORD: ICD-10-PCS | Mod: S$GLB,,, | Performed by: PHYSICIAN ASSISTANT

## 2021-07-14 PROCEDURE — 99999 PR PBB SHADOW E&M-EST. PATIENT-LVL III: CPT | Mod: PBBFAC,,, | Performed by: PHYSICIAN ASSISTANT

## 2021-07-14 PROCEDURE — 1159F PR MEDICATION LIST DOCUMENTED IN MEDICAL RECORD: ICD-10-PCS | Mod: S$GLB,,, | Performed by: PHYSICIAN ASSISTANT

## 2021-07-14 PROCEDURE — 90833 PR PSYCHOTHERAPY W/PATIENT W/E&M, 30 MIN (ADD ON): ICD-10-PCS | Mod: S$GLB,,, | Performed by: PHYSICIAN ASSISTANT

## 2021-07-14 PROCEDURE — 1159F MED LIST DOCD IN RCRD: CPT | Mod: S$GLB,,, | Performed by: PHYSICIAN ASSISTANT

## 2021-07-14 PROCEDURE — 3288F PR FALLS RISK ASSESSMENT DOCUMENTED: ICD-10-PCS | Mod: CPTII,S$GLB,, | Performed by: PHYSICIAN ASSISTANT

## 2021-07-14 PROCEDURE — 1125F AMNT PAIN NOTED PAIN PRSNT: CPT | Mod: S$GLB,,, | Performed by: PHYSICIAN ASSISTANT

## 2021-07-14 PROCEDURE — 99999 PR PBB SHADOW E&M-EST. PATIENT-LVL III: ICD-10-PCS | Mod: PBBFAC,,, | Performed by: PHYSICIAN ASSISTANT

## 2021-07-14 PROCEDURE — 3288F FALL RISK ASSESSMENT DOCD: CPT | Mod: CPTII,S$GLB,, | Performed by: PHYSICIAN ASSISTANT

## 2021-07-14 PROCEDURE — 1101F PR PT FALLS ASSESS DOC 0-1 FALLS W/OUT INJ PAST YR: ICD-10-PCS | Mod: CPTII,S$GLB,, | Performed by: PHYSICIAN ASSISTANT

## 2021-07-14 PROCEDURE — 90833 PSYTX W PT W E/M 30 MIN: CPT | Mod: S$GLB,,, | Performed by: PHYSICIAN ASSISTANT

## 2021-07-14 PROCEDURE — 1125F PR PAIN SEVERITY QUANTIFIED, PAIN PRESENT: ICD-10-PCS | Mod: S$GLB,,, | Performed by: PHYSICIAN ASSISTANT

## 2021-07-14 PROCEDURE — 1157F ADVNC CARE PLAN IN RCRD: CPT | Mod: S$GLB,,, | Performed by: PHYSICIAN ASSISTANT

## 2021-07-27 ENCOUNTER — TELEPHONE (OUTPATIENT)
Dept: PULMONOLOGY | Facility: CLINIC | Age: 81
End: 2021-07-27

## 2021-07-27 ENCOUNTER — TELEPHONE (OUTPATIENT)
Dept: FAMILY MEDICINE | Facility: CLINIC | Age: 81
End: 2021-07-27

## 2021-07-29 ENCOUNTER — TELEPHONE (OUTPATIENT)
Dept: OTOLARYNGOLOGY | Facility: CLINIC | Age: 81
End: 2021-07-29

## 2021-08-02 ENCOUNTER — TELEPHONE (OUTPATIENT)
Dept: FAMILY MEDICINE | Facility: CLINIC | Age: 81
End: 2021-08-02

## 2021-08-06 ENCOUNTER — TELEPHONE (OUTPATIENT)
Dept: FAMILY MEDICINE | Facility: CLINIC | Age: 81
End: 2021-08-06

## 2021-08-07 ENCOUNTER — HOSPITAL ENCOUNTER (EMERGENCY)
Facility: HOSPITAL | Age: 81
Discharge: HOME | End: 2021-08-08
Attending: EMERGENCY MEDICINE
Payer: MEDICARE

## 2021-08-07 DIAGNOSIS — M54.50 RIGHT-SIDED LOW BACK PAIN WITHOUT SCIATICA, UNSPECIFIED CHRONICITY: Primary | ICD-10-CM

## 2021-08-07 DIAGNOSIS — M51.36 LUMBAR DEGENERATIVE DISC DISEASE: ICD-10-CM

## 2021-08-07 DIAGNOSIS — R10.9 RIGHT FLANK PAIN: ICD-10-CM

## 2021-08-07 LAB
ALBUMIN SERPL BCP-MCNC: 3.4 G/DL (ref 3.5–5.2)
ALP SERPL-CCNC: 83 U/L (ref 55–135)
ALT SERPL W/O P-5'-P-CCNC: 10 U/L (ref 10–44)
ANION GAP SERPL CALC-SCNC: 9 MMOL/L (ref 8–16)
AST SERPL-CCNC: 19 U/L (ref 10–40)
BACTERIA #/AREA URNS HPF: ABNORMAL /HPF
BASOPHILS # BLD AUTO: 0.04 K/UL (ref 0–0.2)
BASOPHILS NFR BLD: 0.7 % (ref 0–1.9)
BILIRUB SERPL-MCNC: 0.6 MG/DL (ref 0.1–1)
BILIRUB UR QL STRIP: NEGATIVE
BUN SERPL-MCNC: 12 MG/DL (ref 8–23)
CALCIUM SERPL-MCNC: 8.4 MG/DL (ref 8.7–10.5)
CHLORIDE SERPL-SCNC: 106 MMOL/L (ref 95–110)
CLARITY UR: CLEAR
CO2 SERPL-SCNC: 22 MMOL/L (ref 23–29)
COLOR UR: YELLOW
CREAT SERPL-MCNC: 1.3 MG/DL (ref 0.5–1.4)
DIFFERENTIAL METHOD: ABNORMAL
EOSINOPHIL # BLD AUTO: 0.2 K/UL (ref 0–0.5)
EOSINOPHIL NFR BLD: 3 % (ref 0–8)
ERYTHROCYTE [DISTWIDTH] IN BLOOD BY AUTOMATED COUNT: 14.6 % (ref 11.5–14.5)
EST. GFR  (AFRICAN AMERICAN): >60 ML/MIN/1.73 M^2
EST. GFR  (NON AFRICAN AMERICAN): 52 ML/MIN/1.73 M^2
GLUCOSE SERPL-MCNC: 223 MG/DL (ref 70–110)
GLUCOSE UR QL STRIP: NEGATIVE
HCT VFR BLD AUTO: 34.8 % (ref 40–54)
HGB BLD-MCNC: 11.8 G/DL (ref 14–18)
HGB UR QL STRIP: NEGATIVE
HYALINE CASTS #/AREA URNS LPF: 1 /LPF
IMM GRANULOCYTES # BLD AUTO: 0.02 K/UL (ref 0–0.04)
IMM GRANULOCYTES NFR BLD AUTO: 0.3 % (ref 0–0.5)
INR PPP: 2.3 (ref 0.8–1.2)
KETONES UR QL STRIP: NEGATIVE
LEUKOCYTE ESTERASE UR QL STRIP: NEGATIVE
LIPASE SERPL-CCNC: 52 U/L (ref 4–60)
LYMPHOCYTES # BLD AUTO: 0.9 K/UL (ref 1–4.8)
LYMPHOCYTES NFR BLD: 15 % (ref 18–48)
MCH RBC QN AUTO: 28.7 PG (ref 27–31)
MCHC RBC AUTO-ENTMCNC: 33.9 G/DL (ref 32–36)
MCV RBC AUTO: 85 FL (ref 82–98)
MICROSCOPIC COMMENT: ABNORMAL
MONOCYTES # BLD AUTO: 0.8 K/UL (ref 0.3–1)
MONOCYTES NFR BLD: 14.1 % (ref 4–15)
NEUTROPHILS # BLD AUTO: 3.8 K/UL (ref 1.8–7.7)
NEUTROPHILS NFR BLD: 66.9 % (ref 38–73)
NITRITE UR QL STRIP: NEGATIVE
NRBC BLD-RTO: 0 /100 WBC
PH UR STRIP: 6 [PH] (ref 5–8)
PLATELET # BLD AUTO: 192 K/UL (ref 150–450)
PMV BLD AUTO: 10 FL (ref 9.2–12.9)
POTASSIUM SERPL-SCNC: 4.7 MMOL/L (ref 3.5–5.1)
PROT SERPL-MCNC: 7 G/DL (ref 6–8.4)
PROT UR QL STRIP: ABNORMAL
PROTHROMBIN TIME: 23.6 SEC (ref 9–12.5)
RBC # BLD AUTO: 4.11 M/UL (ref 4.6–6.2)
RBC #/AREA URNS HPF: 11 /HPF (ref 0–4)
SODIUM SERPL-SCNC: 137 MMOL/L (ref 136–145)
SP GR UR STRIP: 1.01 (ref 1–1.03)
URN SPEC COLLECT METH UR: ABNORMAL
UROBILINOGEN UR STRIP-ACNC: ABNORMAL EU/DL
WBC # BLD AUTO: 5.73 K/UL (ref 3.9–12.7)
WBC #/AREA URNS HPF: 0 /HPF (ref 0–5)

## 2021-08-07 PROCEDURE — 81000 URINALYSIS NONAUTO W/SCOPE: CPT | Performed by: EMERGENCY MEDICINE

## 2021-08-07 PROCEDURE — 85025 COMPLETE CBC W/AUTO DIFF WBC: CPT | Performed by: EMERGENCY MEDICINE

## 2021-08-07 PROCEDURE — 85610 PROTHROMBIN TIME: CPT | Performed by: EMERGENCY MEDICINE

## 2021-08-07 PROCEDURE — 83690 ASSAY OF LIPASE: CPT | Performed by: EMERGENCY MEDICINE

## 2021-08-07 PROCEDURE — 80053 COMPREHEN METABOLIC PANEL: CPT | Performed by: EMERGENCY MEDICINE

## 2021-08-07 PROCEDURE — 96374 THER/PROPH/DIAG INJ IV PUSH: CPT

## 2021-08-07 PROCEDURE — 25000003 PHARM REV CODE 250: Performed by: EMERGENCY MEDICINE

## 2021-08-07 PROCEDURE — 99285 EMERGENCY DEPT VISIT HI MDM: CPT | Mod: 25

## 2021-08-07 PROCEDURE — 63600175 PHARM REV CODE 636 W HCPCS: Performed by: EMERGENCY MEDICINE

## 2021-08-07 RX ORDER — HYDROCODONE BITARTRATE AND ACETAMINOPHEN 5; 325 MG/1; MG/1
1 TABLET ORAL EVERY 4 HOURS PRN
Qty: 10 TABLET | Refills: 0 | Status: SHIPPED | OUTPATIENT
Start: 2021-08-07 | End: 2023-02-24

## 2021-08-07 RX ORDER — CYCLOBENZAPRINE HCL 10 MG
10 TABLET ORAL 3 TIMES DAILY PRN
Qty: 15 TABLET | Refills: 0 | Status: SHIPPED | OUTPATIENT
Start: 2021-08-07 | End: 2021-08-12

## 2021-08-07 RX ORDER — LIDOCAINE 50 MG/G
1 PATCH TOPICAL
Status: DISCONTINUED | OUTPATIENT
Start: 2021-08-07 | End: 2021-08-08 | Stop reason: HOSPADM

## 2021-08-07 RX ORDER — ACETAMINOPHEN 500 MG
1000 TABLET ORAL
Status: COMPLETED | OUTPATIENT
Start: 2021-08-07 | End: 2021-08-07

## 2021-08-07 RX ORDER — KETOROLAC TROMETHAMINE 30 MG/ML
15 INJECTION, SOLUTION INTRAMUSCULAR; INTRAVENOUS
Status: COMPLETED | OUTPATIENT
Start: 2021-08-07 | End: 2021-08-07

## 2021-08-07 RX ORDER — LIDOCAINE 50 MG/G
1 PATCH TOPICAL DAILY
Qty: 5 PATCH | Refills: 1 | Status: SHIPPED | OUTPATIENT
Start: 2021-08-07

## 2021-08-07 RX ADMIN — KETOROLAC TROMETHAMINE 15 MG: 30 INJECTION, SOLUTION INTRAMUSCULAR; INTRAVENOUS at 11:08

## 2021-08-07 RX ADMIN — LIDOCAINE 1 PATCH: 50 PATCH TOPICAL at 08:08

## 2021-08-07 RX ADMIN — ACETAMINOPHEN 1000 MG: 500 TABLET, FILM COATED ORAL at 08:08

## 2021-08-08 VITALS
DIASTOLIC BLOOD PRESSURE: 92 MMHG | BODY MASS INDEX: 25.48 KG/M2 | RESPIRATION RATE: 16 BRPM | WEIGHT: 178 LBS | TEMPERATURE: 98 F | HEART RATE: 76 BPM | HEIGHT: 70 IN | SYSTOLIC BLOOD PRESSURE: 149 MMHG | OXYGEN SATURATION: 96 %

## 2021-08-09 ENCOUNTER — PES CALL (OUTPATIENT)
Dept: ADMINISTRATIVE | Facility: CLINIC | Age: 81
End: 2021-08-09

## 2021-08-11 ENCOUNTER — OFFICE VISIT (OUTPATIENT)
Dept: FAMILY MEDICINE | Facility: CLINIC | Age: 81
End: 2021-08-11
Payer: MEDICARE

## 2021-08-11 VITALS
HEART RATE: 89 BPM | BODY MASS INDEX: 25.6 KG/M2 | HEIGHT: 70 IN | WEIGHT: 178.81 LBS | RESPIRATION RATE: 18 BRPM | OXYGEN SATURATION: 93 % | SYSTOLIC BLOOD PRESSURE: 122 MMHG | DIASTOLIC BLOOD PRESSURE: 80 MMHG

## 2021-08-11 DIAGNOSIS — Z09 HOSPITAL DISCHARGE FOLLOW-UP: Primary | ICD-10-CM

## 2021-08-11 DIAGNOSIS — M54.50 CHRONIC RIGHT-SIDED LOW BACK PAIN WITHOUT SCIATICA: ICD-10-CM

## 2021-08-11 DIAGNOSIS — E78.5 HYPERLIPIDEMIA LDL GOAL <100: ICD-10-CM

## 2021-08-11 DIAGNOSIS — M62.838 MUSCLE SPASM: ICD-10-CM

## 2021-08-11 DIAGNOSIS — G47.33 OBSTRUCTIVE SLEEP APNEA TREATED WITH BIPAP: ICD-10-CM

## 2021-08-11 DIAGNOSIS — G89.29 CHRONIC RIGHT-SIDED LOW BACK PAIN WITHOUT SCIATICA: ICD-10-CM

## 2021-08-11 DIAGNOSIS — I10 ESSENTIAL HYPERTENSION: ICD-10-CM

## 2021-08-11 DIAGNOSIS — M47.816 SPONDYLOSIS OF LUMBAR REGION WITHOUT MYELOPATHY OR RADICULOPATHY: ICD-10-CM

## 2021-08-11 PROCEDURE — 3074F SYST BP LT 130 MM HG: CPT | Mod: CPTII,S$GLB,, | Performed by: FAMILY MEDICINE

## 2021-08-11 PROCEDURE — 1157F PR ADVANCE CARE PLAN OR EQUIV PRESENT IN MEDICAL RECORD: ICD-10-PCS | Mod: CPTII,S$GLB,, | Performed by: FAMILY MEDICINE

## 2021-08-11 PROCEDURE — 1157F ADVNC CARE PLAN IN RCRD: CPT | Mod: CPTII,S$GLB,, | Performed by: FAMILY MEDICINE

## 2021-08-11 PROCEDURE — 99214 PR OFFICE/OUTPT VISIT, EST, LEVL IV, 30-39 MIN: ICD-10-PCS | Mod: S$GLB,,, | Performed by: FAMILY MEDICINE

## 2021-08-11 PROCEDURE — 99214 OFFICE O/P EST MOD 30 MIN: CPT | Mod: S$GLB,,, | Performed by: FAMILY MEDICINE

## 2021-08-11 PROCEDURE — 1125F PR PAIN SEVERITY QUANTIFIED, PAIN PRESENT: ICD-10-PCS | Mod: CPTII,S$GLB,, | Performed by: FAMILY MEDICINE

## 2021-08-11 PROCEDURE — 1160F PR REVIEW ALL MEDS BY PRESCRIBER/CLIN PHARMACIST DOCUMENTED: ICD-10-PCS | Mod: CPTII,S$GLB,, | Performed by: FAMILY MEDICINE

## 2021-08-11 PROCEDURE — 1125F AMNT PAIN NOTED PAIN PRSNT: CPT | Mod: CPTII,S$GLB,, | Performed by: FAMILY MEDICINE

## 2021-08-11 PROCEDURE — 99999 PR PBB SHADOW E&M-EST. PATIENT-LVL V: CPT | Mod: PBBFAC,,, | Performed by: FAMILY MEDICINE

## 2021-08-11 PROCEDURE — 1159F MED LIST DOCD IN RCRD: CPT | Mod: CPTII,S$GLB,, | Performed by: FAMILY MEDICINE

## 2021-08-11 PROCEDURE — 3079F DIAST BP 80-89 MM HG: CPT | Mod: CPTII,S$GLB,, | Performed by: FAMILY MEDICINE

## 2021-08-11 PROCEDURE — 1159F PR MEDICATION LIST DOCUMENTED IN MEDICAL RECORD: ICD-10-PCS | Mod: CPTII,S$GLB,, | Performed by: FAMILY MEDICINE

## 2021-08-11 PROCEDURE — 3079F PR MOST RECENT DIASTOLIC BLOOD PRESSURE 80-89 MM HG: ICD-10-PCS | Mod: CPTII,S$GLB,, | Performed by: FAMILY MEDICINE

## 2021-08-11 PROCEDURE — 3052F HG A1C>EQUAL 8.0%<EQUAL 9.0%: CPT | Mod: CPTII,S$GLB,, | Performed by: FAMILY MEDICINE

## 2021-08-11 PROCEDURE — 3052F PR MOST RECENT HEMOGLOBIN A1C LEVEL 8.0 - < 9.0%: ICD-10-PCS | Mod: CPTII,S$GLB,, | Performed by: FAMILY MEDICINE

## 2021-08-11 PROCEDURE — 1101F PT FALLS ASSESS-DOCD LE1/YR: CPT | Mod: CPTII,S$GLB,, | Performed by: FAMILY MEDICINE

## 2021-08-11 PROCEDURE — 99999 PR PBB SHADOW E&M-EST. PATIENT-LVL V: ICD-10-PCS | Mod: PBBFAC,,, | Performed by: FAMILY MEDICINE

## 2021-08-11 PROCEDURE — 3074F PR MOST RECENT SYSTOLIC BLOOD PRESSURE < 130 MM HG: ICD-10-PCS | Mod: CPTII,S$GLB,, | Performed by: FAMILY MEDICINE

## 2021-08-11 PROCEDURE — 1101F PR PT FALLS ASSESS DOC 0-1 FALLS W/OUT INJ PAST YR: ICD-10-PCS | Mod: CPTII,S$GLB,, | Performed by: FAMILY MEDICINE

## 2021-08-11 PROCEDURE — 3288F FALL RISK ASSESSMENT DOCD: CPT | Mod: CPTII,S$GLB,, | Performed by: FAMILY MEDICINE

## 2021-08-11 PROCEDURE — 1160F RVW MEDS BY RX/DR IN RCRD: CPT | Mod: CPTII,S$GLB,, | Performed by: FAMILY MEDICINE

## 2021-08-11 PROCEDURE — 3288F PR FALLS RISK ASSESSMENT DOCUMENTED: ICD-10-PCS | Mod: CPTII,S$GLB,, | Performed by: FAMILY MEDICINE

## 2021-08-11 RX ORDER — METHOCARBAMOL 500 MG/1
500 TABLET, FILM COATED ORAL 4 TIMES DAILY
Qty: 60 TABLET | Refills: 1 | Status: SHIPPED | OUTPATIENT
Start: 2021-08-11 | End: 2022-12-09

## 2021-08-20 ENCOUNTER — TELEPHONE (OUTPATIENT)
Dept: PULMONOLOGY | Facility: CLINIC | Age: 81
End: 2021-08-20

## 2021-08-20 ENCOUNTER — TELEPHONE (OUTPATIENT)
Dept: FAMILY MEDICINE | Facility: CLINIC | Age: 81
End: 2021-08-20

## 2021-08-25 ENCOUNTER — NURSE TRIAGE (OUTPATIENT)
Dept: ADMINISTRATIVE | Facility: CLINIC | Age: 81
End: 2021-08-25

## 2021-08-25 ENCOUNTER — TELEPHONE (OUTPATIENT)
Dept: FAMILY MEDICINE | Facility: CLINIC | Age: 81
End: 2021-08-25

## 2021-08-26 ENCOUNTER — HOSPITAL ENCOUNTER (EMERGENCY)
Facility: HOSPITAL | Age: 81
Discharge: HOME | End: 2021-08-26
Attending: EMERGENCY MEDICINE
Payer: MEDICARE

## 2021-08-26 VITALS
DIASTOLIC BLOOD PRESSURE: 77 MMHG | TEMPERATURE: 98 F | HEART RATE: 65 BPM | SYSTOLIC BLOOD PRESSURE: 137 MMHG | RESPIRATION RATE: 18 BRPM | BODY MASS INDEX: 25.76 KG/M2 | HEIGHT: 68 IN | WEIGHT: 170 LBS | OXYGEN SATURATION: 95 %

## 2021-08-26 DIAGNOSIS — J02.9 PHARYNGITIS, UNSPECIFIED ETIOLOGY: Primary | ICD-10-CM

## 2021-08-26 DIAGNOSIS — J44.1 COPD EXACERBATION: ICD-10-CM

## 2021-08-26 LAB
CTP QC/QA: YES
INFLUENZA A ANTIGEN, POC: NEGATIVE
INFLUENZA B ANTIGEN, POC: NEGATIVE
POC RAPID STREP A: NEGATIVE
SARS-COV-2 RDRP RESP QL NAA+PROBE: NEGATIVE

## 2021-08-26 PROCEDURE — 87502 INFLUENZA DNA AMP PROBE: CPT | Mod: ER

## 2021-08-26 PROCEDURE — 99284 EMERGENCY DEPT VISIT MOD MDM: CPT | Mod: 25,ER

## 2021-08-26 PROCEDURE — U0002 COVID-19 LAB TEST NON-CDC: HCPCS | Mod: ER | Performed by: EMERGENCY MEDICINE

## 2021-08-26 RX ORDER — ALBUTEROL SULFATE 90 UG/1
AEROSOL, METERED RESPIRATORY (INHALATION)
Qty: 18 G | Refills: 0 | Status: SHIPPED | OUTPATIENT
Start: 2021-08-26 | End: 2022-04-14

## 2021-08-26 RX ORDER — ACETAMINOPHEN 325 MG/1
650 TABLET ORAL EVERY 6 HOURS PRN
Qty: 13 TABLET | Refills: 0 | Status: SHIPPED | OUTPATIENT
Start: 2021-08-26

## 2021-09-07 NOTE — TELEPHONE ENCOUNTER
----- Message from Julia Clancy sent at 9/23/2017  9:49 AM CDT -----  Contact: self  Patient called requesting a flu and pneumonia shot. Please contact him at [phone].    Thanks!   PAST SURGICAL HISTORY:  No significant past surgical history

## 2021-09-08 ENCOUNTER — TELEPHONE (OUTPATIENT)
Dept: PODIATRY | Facility: CLINIC | Age: 81
End: 2021-09-08

## 2021-09-20 ENCOUNTER — TELEPHONE (OUTPATIENT)
Dept: PULMONOLOGY | Facility: CLINIC | Age: 81
End: 2021-09-20

## 2021-09-23 ENCOUNTER — TELEPHONE (OUTPATIENT)
Dept: PULMONOLOGY | Facility: CLINIC | Age: 81
End: 2021-09-23

## 2021-09-29 ENCOUNTER — TELEPHONE (OUTPATIENT)
Dept: FAMILY MEDICINE | Facility: CLINIC | Age: 81
End: 2021-09-29

## 2021-09-30 ENCOUNTER — TELEPHONE (OUTPATIENT)
Dept: FAMILY MEDICINE | Facility: CLINIC | Age: 81
End: 2021-09-30

## 2021-09-30 DIAGNOSIS — K40.90 INGUINAL HERNIA WITHOUT OBSTRUCTION OR GANGRENE, RECURRENCE NOT SPECIFIED, UNSPECIFIED LATERALITY: Primary | ICD-10-CM

## 2021-10-01 ENCOUNTER — TELEPHONE (OUTPATIENT)
Dept: SURGERY | Facility: CLINIC | Age: 81
End: 2021-10-01

## 2021-10-13 ENCOUNTER — OFFICE VISIT (OUTPATIENT)
Dept: OTOLARYNGOLOGY | Facility: CLINIC | Age: 81
End: 2021-10-13
Payer: MEDICARE

## 2021-10-13 VITALS — BODY MASS INDEX: 25.76 KG/M2 | TEMPERATURE: 98 F | HEIGHT: 68 IN | WEIGHT: 170 LBS

## 2021-10-13 DIAGNOSIS — M26.623 BILATERAL TEMPOROMANDIBULAR JOINT PAIN: ICD-10-CM

## 2021-10-13 DIAGNOSIS — H69.91 DYSFUNCTION OF RIGHT EUSTACHIAN TUBE: ICD-10-CM

## 2021-10-13 DIAGNOSIS — J34.3 HYPERTROPHY OF INFERIOR NASAL TURBINATE: ICD-10-CM

## 2021-10-13 DIAGNOSIS — R42 DIZZINESS: ICD-10-CM

## 2021-10-13 DIAGNOSIS — H90.3 SENSORINEURAL HEARING LOSS (SNHL) OF BOTH EARS: Primary | ICD-10-CM

## 2021-10-13 PROCEDURE — 99213 PR OFFICE/OUTPT VISIT, EST, LEVL III, 20-29 MIN: ICD-10-PCS | Mod: S$GLB,,, | Performed by: OTOLARYNGOLOGY

## 2021-10-13 PROCEDURE — 3288F PR FALLS RISK ASSESSMENT DOCUMENTED: ICD-10-PCS | Mod: CPTII,S$GLB,, | Performed by: OTOLARYNGOLOGY

## 2021-10-13 PROCEDURE — 1101F PT FALLS ASSESS-DOCD LE1/YR: CPT | Mod: CPTII,S$GLB,, | Performed by: OTOLARYNGOLOGY

## 2021-10-13 PROCEDURE — 1126F PR PAIN SEVERITY QUANTIFIED, NO PAIN PRESENT: ICD-10-PCS | Mod: CPTII,S$GLB,, | Performed by: OTOLARYNGOLOGY

## 2021-10-13 PROCEDURE — 99499 UNLISTED E&M SERVICE: CPT | Mod: S$GLB,,, | Performed by: OTOLARYNGOLOGY

## 2021-10-13 PROCEDURE — 1126F AMNT PAIN NOTED NONE PRSNT: CPT | Mod: CPTII,S$GLB,, | Performed by: OTOLARYNGOLOGY

## 2021-10-13 PROCEDURE — 99499 RISK ADDL DX/OHS AUDIT: ICD-10-PCS | Mod: S$GLB,,, | Performed by: OTOLARYNGOLOGY

## 2021-10-13 PROCEDURE — 1159F PR MEDICATION LIST DOCUMENTED IN MEDICAL RECORD: ICD-10-PCS | Mod: CPTII,S$GLB,, | Performed by: OTOLARYNGOLOGY

## 2021-10-13 PROCEDURE — 1157F ADVNC CARE PLAN IN RCRD: CPT | Mod: CPTII,S$GLB,, | Performed by: OTOLARYNGOLOGY

## 2021-10-13 PROCEDURE — 1101F PR PT FALLS ASSESS DOC 0-1 FALLS W/OUT INJ PAST YR: ICD-10-PCS | Mod: CPTII,S$GLB,, | Performed by: OTOLARYNGOLOGY

## 2021-10-13 PROCEDURE — 1159F MED LIST DOCD IN RCRD: CPT | Mod: CPTII,S$GLB,, | Performed by: OTOLARYNGOLOGY

## 2021-10-13 PROCEDURE — 1157F PR ADVANCE CARE PLAN OR EQUIV PRESENT IN MEDICAL RECORD: ICD-10-PCS | Mod: CPTII,S$GLB,, | Performed by: OTOLARYNGOLOGY

## 2021-10-13 PROCEDURE — 3288F FALL RISK ASSESSMENT DOCD: CPT | Mod: CPTII,S$GLB,, | Performed by: OTOLARYNGOLOGY

## 2021-10-13 PROCEDURE — 99213 OFFICE O/P EST LOW 20 MIN: CPT | Mod: S$GLB,,, | Performed by: OTOLARYNGOLOGY

## 2021-10-19 ENCOUNTER — OFFICE VISIT (OUTPATIENT)
Dept: SURGERY | Facility: CLINIC | Age: 81
End: 2021-10-19
Payer: MEDICARE

## 2021-10-19 VITALS
HEIGHT: 68 IN | SYSTOLIC BLOOD PRESSURE: 160 MMHG | DIASTOLIC BLOOD PRESSURE: 81 MMHG | WEIGHT: 170 LBS | BODY MASS INDEX: 25.76 KG/M2 | HEART RATE: 65 BPM

## 2021-10-19 DIAGNOSIS — K40.90 INGUINAL HERNIA WITHOUT OBSTRUCTION OR GANGRENE, RECURRENCE NOT SPECIFIED, UNSPECIFIED LATERALITY: ICD-10-CM

## 2021-10-19 PROCEDURE — 99203 PR OFFICE/OUTPT VISIT, NEW, LEVL III, 30-44 MIN: ICD-10-PCS | Mod: S$GLB,,, | Performed by: SURGERY

## 2021-10-19 PROCEDURE — 1160F PR REVIEW ALL MEDS BY PRESCRIBER/CLIN PHARMACIST DOCUMENTED: ICD-10-PCS | Mod: CPTII,S$GLB,, | Performed by: SURGERY

## 2021-10-19 PROCEDURE — 1126F AMNT PAIN NOTED NONE PRSNT: CPT | Mod: CPTII,S$GLB,, | Performed by: SURGERY

## 2021-10-19 PROCEDURE — 3079F DIAST BP 80-89 MM HG: CPT | Mod: CPTII,S$GLB,, | Performed by: SURGERY

## 2021-10-19 PROCEDURE — 99999 PR PBB SHADOW E&M-EST. PATIENT-LVL V: ICD-10-PCS | Mod: PBBFAC,,, | Performed by: SURGERY

## 2021-10-19 PROCEDURE — 1159F PR MEDICATION LIST DOCUMENTED IN MEDICAL RECORD: ICD-10-PCS | Mod: CPTII,S$GLB,, | Performed by: SURGERY

## 2021-10-19 PROCEDURE — 1101F PT FALLS ASSESS-DOCD LE1/YR: CPT | Mod: CPTII,S$GLB,, | Performed by: SURGERY

## 2021-10-19 PROCEDURE — 1157F ADVNC CARE PLAN IN RCRD: CPT | Mod: CPTII,S$GLB,, | Performed by: SURGERY

## 2021-10-19 PROCEDURE — 1160F RVW MEDS BY RX/DR IN RCRD: CPT | Mod: CPTII,S$GLB,, | Performed by: SURGERY

## 2021-10-19 PROCEDURE — 3288F PR FALLS RISK ASSESSMENT DOCUMENTED: ICD-10-PCS | Mod: CPTII,S$GLB,, | Performed by: SURGERY

## 2021-10-19 PROCEDURE — 3288F FALL RISK ASSESSMENT DOCD: CPT | Mod: CPTII,S$GLB,, | Performed by: SURGERY

## 2021-10-19 PROCEDURE — 1157F PR ADVANCE CARE PLAN OR EQUIV PRESENT IN MEDICAL RECORD: ICD-10-PCS | Mod: CPTII,S$GLB,, | Performed by: SURGERY

## 2021-10-19 PROCEDURE — 1101F PR PT FALLS ASSESS DOC 0-1 FALLS W/OUT INJ PAST YR: ICD-10-PCS | Mod: CPTII,S$GLB,, | Performed by: SURGERY

## 2021-10-19 PROCEDURE — 99203 OFFICE O/P NEW LOW 30 MIN: CPT | Mod: S$GLB,,, | Performed by: SURGERY

## 2021-10-19 PROCEDURE — 3077F SYST BP >= 140 MM HG: CPT | Mod: CPTII,S$GLB,, | Performed by: SURGERY

## 2021-10-19 PROCEDURE — 1159F MED LIST DOCD IN RCRD: CPT | Mod: CPTII,S$GLB,, | Performed by: SURGERY

## 2021-10-19 PROCEDURE — 3077F PR MOST RECENT SYSTOLIC BLOOD PRESSURE >= 140 MM HG: ICD-10-PCS | Mod: CPTII,S$GLB,, | Performed by: SURGERY

## 2021-10-19 PROCEDURE — 1126F PR PAIN SEVERITY QUANTIFIED, NO PAIN PRESENT: ICD-10-PCS | Mod: CPTII,S$GLB,, | Performed by: SURGERY

## 2021-10-19 PROCEDURE — 99999 PR PBB SHADOW E&M-EST. PATIENT-LVL V: CPT | Mod: PBBFAC,,, | Performed by: SURGERY

## 2021-10-19 PROCEDURE — 3079F PR MOST RECENT DIASTOLIC BLOOD PRESSURE 80-89 MM HG: ICD-10-PCS | Mod: CPTII,S$GLB,, | Performed by: SURGERY

## 2021-10-20 ENCOUNTER — OFFICE VISIT (OUTPATIENT)
Dept: PODIATRY | Facility: CLINIC | Age: 81
End: 2021-10-20
Payer: MEDICARE

## 2021-10-20 VITALS — BODY MASS INDEX: 25.61 KG/M2 | HEIGHT: 68 IN | WEIGHT: 169 LBS

## 2021-10-20 DIAGNOSIS — M20.12 HALLUX ABDUCTO VALGUS, LEFT: ICD-10-CM

## 2021-10-20 DIAGNOSIS — M20.11 HALLUX ABDUCTO VALGUS, RIGHT: ICD-10-CM

## 2021-10-20 DIAGNOSIS — M20.41 HAMMER TOES OF BOTH FEET: ICD-10-CM

## 2021-10-20 DIAGNOSIS — E11.49 TYPE II DIABETES MELLITUS WITH NEUROLOGICAL MANIFESTATIONS: ICD-10-CM

## 2021-10-20 DIAGNOSIS — R20.2 PARESTHESIA OF BOTH FEET: ICD-10-CM

## 2021-10-20 DIAGNOSIS — M20.42 HAMMER TOES OF BOTH FEET: ICD-10-CM

## 2021-10-20 DIAGNOSIS — B35.1 ONYCHOMYCOSIS DUE TO DERMATOPHYTE: ICD-10-CM

## 2021-10-20 DIAGNOSIS — L84 CORN OR CALLUS: ICD-10-CM

## 2021-10-20 PROCEDURE — 1101F PR PT FALLS ASSESS DOC 0-1 FALLS W/OUT INJ PAST YR: ICD-10-PCS | Mod: CPTII,S$GLB,, | Performed by: PODIATRIST

## 2021-10-20 PROCEDURE — 1157F ADVNC CARE PLAN IN RCRD: CPT | Mod: CPTII,S$GLB,, | Performed by: PODIATRIST

## 2021-10-20 PROCEDURE — 1159F MED LIST DOCD IN RCRD: CPT | Mod: CPTII,S$GLB,, | Performed by: PODIATRIST

## 2021-10-20 PROCEDURE — 99999 PR PBB SHADOW E&M-EST. PATIENT-LVL IV: ICD-10-PCS | Mod: PBBFAC,,, | Performed by: PODIATRIST

## 2021-10-20 PROCEDURE — 3288F FALL RISK ASSESSMENT DOCD: CPT | Mod: CPTII,S$GLB,, | Performed by: PODIATRIST

## 2021-10-20 PROCEDURE — 99999 PR PBB SHADOW E&M-EST. PATIENT-LVL IV: CPT | Mod: PBBFAC,,, | Performed by: PODIATRIST

## 2021-10-20 PROCEDURE — 1159F PR MEDICATION LIST DOCUMENTED IN MEDICAL RECORD: ICD-10-PCS | Mod: CPTII,S$GLB,, | Performed by: PODIATRIST

## 2021-10-20 PROCEDURE — 99499 NO LOS: ICD-10-PCS | Mod: S$GLB,,, | Performed by: PODIATRIST

## 2021-10-20 PROCEDURE — 1101F PT FALLS ASSESS-DOCD LE1/YR: CPT | Mod: CPTII,S$GLB,, | Performed by: PODIATRIST

## 2021-10-20 PROCEDURE — 11721 PR DEBRIDEMENT OF NAILS, 6 OR MORE: ICD-10-PCS | Mod: 59,Q9,S$GLB, | Performed by: PODIATRIST

## 2021-10-20 PROCEDURE — 1160F RVW MEDS BY RX/DR IN RCRD: CPT | Mod: CPTII,S$GLB,, | Performed by: PODIATRIST

## 2021-10-20 PROCEDURE — 1157F PR ADVANCE CARE PLAN OR EQUIV PRESENT IN MEDICAL RECORD: ICD-10-PCS | Mod: CPTII,S$GLB,, | Performed by: PODIATRIST

## 2021-10-20 PROCEDURE — 11721 DEBRIDE NAIL 6 OR MORE: CPT | Mod: 59,Q9,S$GLB, | Performed by: PODIATRIST

## 2021-10-20 PROCEDURE — 99499 UNLISTED E&M SERVICE: CPT | Mod: S$GLB,,, | Performed by: PODIATRIST

## 2021-10-20 PROCEDURE — 3288F PR FALLS RISK ASSESSMENT DOCUMENTED: ICD-10-PCS | Mod: CPTII,S$GLB,, | Performed by: PODIATRIST

## 2021-10-20 PROCEDURE — 1160F PR REVIEW ALL MEDS BY PRESCRIBER/CLIN PHARMACIST DOCUMENTED: ICD-10-PCS | Mod: CPTII,S$GLB,, | Performed by: PODIATRIST

## 2021-10-20 PROCEDURE — 11056 PR TRIM BENIGN HYPERKERATOTIC SKIN LESION,2-4: ICD-10-PCS | Mod: Q9,S$GLB,, | Performed by: PODIATRIST

## 2021-10-20 PROCEDURE — 11056 PARNG/CUTG B9 HYPRKR LES 2-4: CPT | Mod: Q9,S$GLB,, | Performed by: PODIATRIST

## 2021-11-10 ENCOUNTER — TELEPHONE (OUTPATIENT)
Dept: FAMILY MEDICINE | Facility: CLINIC | Age: 81
End: 2021-11-10
Payer: MEDICARE

## 2021-12-01 LAB
ALBUMIN CREATININE RATIO: 237 MG/G
CHOL/HDLC RATIO: 4
CHOLESTEROL, TOTAL: 132
CREATININE RANDOM URINE: 38 MG/DL (ref 20–320)
HBA1C MFR BLD: 8.4 % (ref 4–5.6)
HDLC SERPL-MCNC: 32 MG/DL
LDLC SERPL CALC-MCNC: 83 MG/DL
MICROALBUMIN URINE RANDOM: 9
NONHDLC SERPL-MCNC: 99 MG/DL
TRIGL SERPL-MCNC: 76 MG/DL

## 2021-12-17 ENCOUNTER — TELEPHONE (OUTPATIENT)
Dept: FAMILY MEDICINE | Facility: CLINIC | Age: 81
End: 2021-12-17
Payer: MEDICARE

## 2021-12-22 ENCOUNTER — PATIENT OUTREACH (OUTPATIENT)
Dept: ADMINISTRATIVE | Facility: HOSPITAL | Age: 81
End: 2021-12-22
Payer: MEDICARE

## 2021-12-22 ENCOUNTER — TELEPHONE (OUTPATIENT)
Dept: FAMILY MEDICINE | Facility: CLINIC | Age: 81
End: 2021-12-22
Payer: MEDICARE

## 2021-12-22 RX ORDER — RIVASTIGMINE TARTRATE 1.5 MG/1
1 CAPSULE ORAL
COMMUNITY
Start: 2021-07-29 | End: 2022-04-04 | Stop reason: SDUPTHER

## 2021-12-22 RX ORDER — INFLUENZA VACCINE, ADJUVANTED 15; 15; 15; 15 UG/.5ML; UG/.5ML; UG/.5ML; UG/.5ML
INJECTION, SUSPENSION INTRAMUSCULAR
COMMUNITY
Start: 2021-10-13 | End: 2022-04-04 | Stop reason: ALTCHOICE

## 2021-12-22 RX ORDER — ATORVASTATIN CALCIUM 40 MG/1
40 TABLET, FILM COATED ORAL DAILY
COMMUNITY
Start: 2021-07-03

## 2022-01-04 ENCOUNTER — TELEPHONE (OUTPATIENT)
Dept: FAMILY MEDICINE | Facility: CLINIC | Age: 82
End: 2022-01-04
Payer: MEDICARE

## 2022-01-04 NOTE — TELEPHONE ENCOUNTER
----- Message from Josselyn Mae sent at 1/4/2022  2:33 PM CST -----  .Type: Patient Call Back    Who called: self     What is the request in detail: patient needs new CPAP machine the one he has is being recalled     Can the clinic reply by [facility]? no    Would the patient rather a call back or a response via [facility]? Call     Best call back number: [phone]

## 2022-01-04 NOTE — TELEPHONE ENCOUNTER
Pt requesting new CPAP machine as his current one is recalled. Put in an order for a new one based on the previous order.

## 2022-01-04 NOTE — TELEPHONE ENCOUNTER
Did he get a letter from [employer] that his machine was recalled? He should go through them to request a new machine.  How old is his current machine?

## 2022-01-05 NOTE — TELEPHONE ENCOUNTER
Spoke to pt and asked about how he found out he had a recalled machine. Pt stated he found out through the [employer]. He took a picture of his CPAP machine and sent it to [facility] [facility].     He stated he's had the machine a little over 2 years. He got it through [employer]. When he called [employer], Eric stated they couldn't give him a new one until it was approved by the FDA. They did not give him an approx. Time it would take to be approved by the FDA.     Pt also stated Dr. Stinson has given him filters and tubing to use for now to help him. He also stated he has been feeling more out of breath and weak since he stopped using his CPAP.

## 2022-01-07 ENCOUNTER — TELEPHONE (OUTPATIENT)
Dept: PULMONOLOGY | Facility: CLINIC | Age: 82
End: 2022-01-07
Payer: MEDICARE

## 2022-01-07 NOTE — TELEPHONE ENCOUNTER
Spoke to pt and notified him of Delvis's message to call his pulmonologist, Dr. Stinson. Pt understood and will call her for a new Rx.

## 2022-01-07 NOTE — TELEPHONE ENCOUNTER
Returned call no answer.                    ----- Message from Mor Li MA sent at 1/7/2022 11:01 AM CST -----  Corine Castro Staff  Caller: pt @  (Today, 10:55 AM)  Pt asking to speak with someone in Dr. Stinson's office regarding his CPAP machine, says it has a recall on it, please call.

## 2022-01-10 ENCOUNTER — TELEPHONE (OUTPATIENT)
Dept: PULMONOLOGY | Facility: CLINIC | Age: 82
End: 2022-01-10
Payer: MEDICARE

## 2022-01-10 NOTE — TELEPHONE ENCOUNTER
Left message to call office back.    TERRY Juares  Pulm/Sleep [facility] [place]  [phone]                  ----- Message from Junie Barriga sent at 1/10/2022  9:36 AM CST -----  Regarding: Patient Advice        Name of Who is Calling: Percy Ramirez    Who Left The Message: Percy Ramirez      What is the request in detail:      Patient called requesting a call regarding his CPAP machine. Patient would like to know when will it be replaced?    Please give a call back at your earliest convenience and further advise.   Thank you!      Reply by [facility]: No      Preferred Call Back  :   [phone] (O)

## 2022-01-12 ENCOUNTER — TELEPHONE (OUTPATIENT)
Dept: PULMONOLOGY | Facility: CLINIC | Age: 82
End: 2022-01-12
Payer: MEDICARE

## 2022-01-12 NOTE — TELEPHONE ENCOUNTER
Left message to call office back.    TERRY Juares  Pulm/Sleep [facility] [place]  [phone]                        ----- Message from Dorys Mosqueda sent at 1/12/2022 10:13 AM CST -----  Contact: Patient [phone]  Type: Patient Call Back    Who called:Patient     What is the request in detail: States that his C Pap has a recall on it. Need to know what to do? Would like to speak to nurse in regards to getting another machine.    Would the patient rather a call back or a response via [facility]? Call back    Best call back number: [phone]

## 2022-01-19 ENCOUNTER — TELEPHONE (OUTPATIENT)
Dept: FAMILY MEDICINE | Facility: CLINIC | Age: 82
End: 2022-01-19
Payer: MEDICARE

## 2022-01-19 DIAGNOSIS — R26.89 BALANCE DISORDER: Primary | ICD-10-CM

## 2022-01-19 NOTE — TELEPHONE ENCOUNTER
Spoke with pt he states that the medication is not working. He states that he is feeling wobbly and is asking should he change the medication or dosage. Pharmacy and allergies have been reviewed.

## 2022-01-19 NOTE — TELEPHONE ENCOUNTER
Does he feel more off balance or is he feeling like the room is spinning. Please note that this medication can cause unsteadiness. It is not a cure for the dizziness but may just help with the symptoms. Has he seen ENT for dizziness in the past?

## 2022-01-20 NOTE — TELEPHONE ENCOUNTER
Spoke with pt he states that he feel like a boat is rocking and he feels drunk. Pt says that he bumps into the wall but does not fall, but feels as if he is falling. Pt says that he went to the ENT and was prescribed nasal spray, was informed that his ears were clean and that it was something eternal, nothing much they can do. Pt states that he also went to the eye doctor and they give him shots in his eyes but nothing much they can do either. Pt says he went to the ENT about 2-3 months ago. Pt says that dizziness last a couple of hours usually occurs when he is sitting for a long period of time, he has to stand up for awhile to regain composure. Pt says the medication helps a bit but not much. Pt also notes that when he is in the car he has to put his head down for awhile because he gets dizzy and if he stares at a object to long it looks as if the objects starts to move. Pt has been informed of message below as well.

## 2022-01-20 NOTE — TELEPHONE ENCOUNTER
The only other thought is to see a neurologist for further evaluation. The medication he is taking is not a cure and sometimes may not help the reason for his balance concern.   Consult order placed.

## 2022-01-21 ENCOUNTER — TELEPHONE (OUTPATIENT)
Dept: PULMONOLOGY | Facility: CLINIC | Age: 82
End: 2022-01-21
Payer: MEDICARE

## 2022-01-21 ENCOUNTER — TELEPHONE (OUTPATIENT)
Dept: NEUROLOGY | Facility: CLINIC | Age: 82
End: 2022-01-21
Payer: MEDICARE

## 2022-01-21 NOTE — TELEPHONE ENCOUNTER
----- Message from Kate Brandt sent at 1/20/2022  4:03 PM CST -----  .Type: Patient Call Back    Who called:self     What is the request in detail: patient would like to know why he is on rivastigmine tartrate (EXELON) 1.5 MG capsule. He would like a call to clarify     Can the clinic reply by [facility]? Call     Would the patient rather a call back or a response via [facility]?  Call back     Best call back number: [phone] (home)       Informed patient he needs an appointment

## 2022-01-21 NOTE — TELEPHONE ENCOUNTER
----- Message from Shannan Mae sent at 1/21/2022 10:05 AM CST -----  Regarding: self  [phone]  .Type:  Patient Returning Call    Who Called:  self     Who Left Message for Patient: Mor Li    Does the patient know what this is regarding? yes    Would the patient rather a call back or a response via [facility]? Call back    Best Call Back Number: [phone]

## 2022-02-01 ENCOUNTER — OFFICE VISIT (OUTPATIENT)
Dept: PODIATRY | Facility: CLINIC | Age: 82
End: 2022-02-01
Payer: MEDICARE

## 2022-02-01 VITALS — BODY MASS INDEX: 25.62 KG/M2 | HEIGHT: 68 IN | WEIGHT: 169.06 LBS

## 2022-02-01 DIAGNOSIS — B35.1 ONYCHOMYCOSIS DUE TO DERMATOPHYTE: ICD-10-CM

## 2022-02-01 DIAGNOSIS — M20.12 HALLUX ABDUCTO VALGUS, LEFT: ICD-10-CM

## 2022-02-01 DIAGNOSIS — L60.0 INGROWN NAIL: ICD-10-CM

## 2022-02-01 DIAGNOSIS — L84 CORN OR CALLUS: ICD-10-CM

## 2022-02-01 DIAGNOSIS — M20.42 HAMMER TOES OF BOTH FEET: ICD-10-CM

## 2022-02-01 DIAGNOSIS — E11.49 TYPE II DIABETES MELLITUS WITH NEUROLOGICAL MANIFESTATIONS: ICD-10-CM

## 2022-02-01 DIAGNOSIS — M20.11 HALLUX ABDUCTO VALGUS, RIGHT: ICD-10-CM

## 2022-02-01 DIAGNOSIS — M20.41 HAMMER TOES OF BOTH FEET: ICD-10-CM

## 2022-02-01 PROCEDURE — 1159F PR MEDICATION LIST DOCUMENTED IN MEDICAL RECORD: ICD-10-PCS | Mod: CPTII,S$GLB,, | Performed by: PODIATRIST

## 2022-02-01 PROCEDURE — 1160F PR REVIEW ALL MEDS BY PRESCRIBER/CLIN PHARMACIST DOCUMENTED: ICD-10-PCS | Mod: CPTII,S$GLB,, | Performed by: PODIATRIST

## 2022-02-01 PROCEDURE — 99499 UNLISTED E&M SERVICE: CPT | Mod: S$GLB,,, | Performed by: PODIATRIST

## 2022-02-01 PROCEDURE — 11056 PR TRIM BENIGN HYPERKERATOTIC SKIN LESION,2-4: ICD-10-PCS | Mod: Q9,S$GLB,, | Performed by: PODIATRIST

## 2022-02-01 PROCEDURE — 1157F PR ADVANCE CARE PLAN OR EQUIV PRESENT IN MEDICAL RECORD: ICD-10-PCS | Mod: CPTII,S$GLB,, | Performed by: PODIATRIST

## 2022-02-01 PROCEDURE — 11721 DEBRIDE NAIL 6 OR MORE: CPT | Mod: 59,Q9,S$GLB, | Performed by: PODIATRIST

## 2022-02-01 PROCEDURE — 3288F PR FALLS RISK ASSESSMENT DOCUMENTED: ICD-10-PCS | Mod: CPTII,S$GLB,, | Performed by: PODIATRIST

## 2022-02-01 PROCEDURE — 99999 PR PBB SHADOW E&M-EST. PATIENT-LVL V: ICD-10-PCS | Mod: PBBFAC,,, | Performed by: PODIATRIST

## 2022-02-01 PROCEDURE — 99999 PR PBB SHADOW E&M-EST. PATIENT-LVL V: CPT | Mod: PBBFAC,,, | Performed by: PODIATRIST

## 2022-02-01 PROCEDURE — 11721 PR DEBRIDEMENT OF NAILS, 6 OR MORE: ICD-10-PCS | Mod: 59,Q9,S$GLB, | Performed by: PODIATRIST

## 2022-02-01 PROCEDURE — 3288F FALL RISK ASSESSMENT DOCD: CPT | Mod: CPTII,S$GLB,, | Performed by: PODIATRIST

## 2022-02-01 PROCEDURE — 1101F PR PT FALLS ASSESS DOC 0-1 FALLS W/OUT INJ PAST YR: ICD-10-PCS | Mod: CPTII,S$GLB,, | Performed by: PODIATRIST

## 2022-02-01 PROCEDURE — 1160F RVW MEDS BY RX/DR IN RCRD: CPT | Mod: CPTII,S$GLB,, | Performed by: PODIATRIST

## 2022-02-01 PROCEDURE — 1159F MED LIST DOCD IN RCRD: CPT | Mod: CPTII,S$GLB,, | Performed by: PODIATRIST

## 2022-02-01 PROCEDURE — 1157F ADVNC CARE PLAN IN RCRD: CPT | Mod: CPTII,S$GLB,, | Performed by: PODIATRIST

## 2022-02-01 PROCEDURE — 11056 PARNG/CUTG B9 HYPRKR LES 2-4: CPT | Mod: Q9,S$GLB,, | Performed by: PODIATRIST

## 2022-02-01 PROCEDURE — 1126F PR PAIN SEVERITY QUANTIFIED, NO PAIN PRESENT: ICD-10-PCS | Mod: CPTII,S$GLB,, | Performed by: PODIATRIST

## 2022-02-01 PROCEDURE — 99499 NO LOS: ICD-10-PCS | Mod: S$GLB,,, | Performed by: PODIATRIST

## 2022-02-01 PROCEDURE — 1126F AMNT PAIN NOTED NONE PRSNT: CPT | Mod: CPTII,S$GLB,, | Performed by: PODIATRIST

## 2022-02-01 PROCEDURE — 1101F PT FALLS ASSESS-DOCD LE1/YR: CPT | Mod: CPTII,S$GLB,, | Performed by: PODIATRIST

## 2022-02-01 NOTE — PROGRESS NOTES
Subjective:      Patient ID: Percy Ramirez is a 81 y.o. male.    Chief Complaint: Diabetes Mellitus (Dr Edmondson PCP 7/7/21) and Nail Care    Percy is a 81 y.o. male who presents to the clinic for evaluation and treatment of high risk feet. Percy has a past medical history of Allergy, Arthritis, CAD, multiple vessel, Cataract, Depression, History of colon polyps, Hyperlipidemia, Hypertension, Macular degeneration, S/P CABG x 2, and Type 2 diabetes mellitus with circulatory disorder. The patient's chief complaint is elongated, thickened toenails aggravated by increased weight bearing, shoe gear, pressure. This patient has documented high risk feet requiring routine maintenance secondary to diabetes mellitis and those secondary complications of diabetes, as mentioned..    PCP: Kasie Edmondson MD    Date Last Seen by PCP:   Chief Complaint   Patient presents with    Diabetes Mellitus     Dr Edmondson PCP 7/7/21    Nail Care     Current shoe gear:  rx shoes    Hemoglobin A1C   Date Value Ref Range Status   12/01/2021 8.4 (H) 4.0 - 5.6 % Final     Comment:     [facility]   06/01/2021 8.2 (H) 4.0 - 5.6 % Final     Comment:     [facility]   02/19/2021 9.4 (H) 4.0 - 5.6 % Final     Comment:     [facility]     Patient Active Problem List   Diagnosis    Major depressive disorder, recurrent episode, mild    Essential hypertension    Type 2 diabetes, uncontrolled, with retinopathy    Coronary artery disease    S/P CABG x 2    Macular degeneration    Obstructive sleep apnea treated with BiPAP    Anxiety state, unspecified    Insulin long-term use    Primary osteoarthritis of both knees    Chronic low back pain    DJD (degenerative joint disease), lumbar    Type 2 diabetes, uncontrolled, with neuropathy    Bilateral carotid artery disease    Hyperlipidemia LDL goal <100    Type 2 diabetes, uncontrolled, with peripheral circulatory disorder    COPD with chronic bronchitis and emphysema    Atherosclerosis of  "abdominal aorta    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    Overweight (BMI 25.0-29.9)    Persistent atrial fibrillation    Chronic stable angina    Senile purpura    Osteoarthritis of carpometacarpal (CMC) joint of left thumb    MCI (mild cognitive impairment)    Chronic vertigo    Pulmonary nodule    Dyspnea on exertion    Chronic respiratory failure with hypoxia, on home O2 therapy    Chronic combined systolic and diastolic heart failure    Ambulates with cane    ILD (interstitial lung disease)    History of cardioversion    Erectile dysfunction    Secondary hyperparathyroidism of renal origin    Noncompliance with medication regimen     Current Outpatient Medications on File Prior to Visit   Medication Sig Dispense Refill    acetaminophen (TYLENOL) 325 MG tablet Take 2 tablets (650 mg total) by mouth every 6 (six) hours as needed. 13 tablet 0    albuterol (PROVENTIL/VENTOLIN HFA) 90 mcg/actuation inhaler Rescue 18 g 0    atorvastatin (LIPITOR) 40 MG tablet Take 40 mg by mouth once daily.      BD INSULIN PEN NEEDLE UF SHORT 31 gauge x 5/16" Ndle       BD INSULIN SYRINGE ULTRA-FINE 1/2 mL 31 gauge x 15/64" Syrg       blood sugar diagnostic Strp To check BG 3 times daily, to use with insurance preferred meter 200 strip 11    blood-glucose meter kit To check BG 3 times daily, to use with insurance preferred meter 1 each 0    carvedilol (COREG) 25 MG tablet Take 1 tablet (25 mg total) by mouth 2 (two) times daily. 180 tablet 0    cinnamon bark 500 mg capsule Take 1,000 mg by mouth once daily.        clopidogreL (PLAVIX) 75 mg tablet Take 75 mg by mouth.      diclofenac sodium (VOLTAREN) 1 % Gel Apply 2 g topically 2 (two) times daily. 100 g 0    DULoxetine (CYMBALTA) 60 MG capsule Take 1 capsule (60 mg total) by mouth once daily. 90 capsule 3    ENTRESTO  mg per tablet Take 1 tablet by mouth 2 (two) times daily.      FLUAD QUAD 2021-22,65Y " "UP,,PF, 60 mcg (15 mcg x 4)/0.5 mL Syrg       fluticasone propionate (FLONASE) 50 mcg/actuation nasal spray 1 spray (50 mcg total) by Each Nostril route 2 (two) times daily. 18.2 mL 3    gabapentin (NEURONTIN) 300 MG capsule Take 4 capsules (1,200 mg total) by mouth 2 (two) times daily. (Patient taking differently: Take 900 mg by mouth 2 (two) times daily.) 540 capsule 1    glimepiride (AMARYL) 4 MG tablet Take 4 mg by mouth daily with breakfast.       HYDROcodone-acetaminophen (NORCO) 5-325 mg per tablet Take 1 tablet by mouth every 4 (four) hours as needed for Pain. Causes drowsiness. Do not drive or operate machinery with this medication. Do not drink alcohol with this medication. Causes constipation. Take with stool softener. 10 tablet 0    insulin NPH-insulin regular, 70/30, (NOVOLIN 70/30) 100 unit/mL (70-30) injection Inject into the skin. Inject 10 units at breakfast and inject 10 units at night      insulin syringe-needle U-100 0.3 mL 31 gauge x 15/64" Syrg USE TO INJECT INSULIN THREE TIMES DAILY      insulin syringe-needle U-100 1/2 mL 31 x 5/16" Syrg       isosorbide mononitrate (IMDUR) 30 MG 24 hr tablet Take 30 mg by mouth once daily.      lancets Misc To check BG 3 times daily, to use with insurance preferred meter 200 each 11    LIDOcaine (LIDODERM) 5 % Place 1 patch onto the skin once daily. Remove & Discard patch within 12 hours or as directed by MD 5 patch 1    metformin (GLUCOPHAGE) 500 MG tablet Take 1,000 mg by mouth 2 (two) times daily with meals. 2 Tablets in the morning, 2 Tablets in the evening      metoclopramide HCl (REGLAN) 5 MG tablet Take 5 mg by mouth 3 (three) times daily as needed.      nitroGLYCERIN (NITROSTAT) 0.4 MG SL tablet DISSOLVE 1 TABLET UNDER THE TONGUE EVERY 5 MINUTES AS  NEEDED FOR CHEST PAIN. MAX  OF 3 TABLETS IN 15 MINUTES. CALL 911 IF PAIN PERSISTS.      pravastatin (PRAVACHOL) 20 MG tablet Take 1 tablet (20 mg total) by mouth once daily. 90 tablet 1    " rivastigmine tartrate (EXELON) 1.5 MG capsule TAKE 1 CAPSULE BY MOUTH  TWICE DAILY 180 capsule 3    rivastigmine tartrate (EXELON) 1.5 MG capsule Take 1 capsule by mouth.      spironolactone (ALDACTONE) 25 MG tablet Take 25 mg by mouth.      spironolactone (ALDACTONE) 25 MG tablet Take 12.5 mg by mouth.      VIT C/VIT E AC/LUT/COPPER/ZINC (PRESERVISION LUTEIN ORAL) Take by mouth.      warfarin (COUMADIN) 5 MG tablet Take 2 tabs by mouth on Tuesday,Thursday, Saturday and Sundays then 1.5 on all other days.      [DISCONTINUED] diazepam (VALIUM) 2 MG tablet Take 2 mg by mouth continuous prn.       No current facility-administered medications on file prior to visit.     Review of patient's allergies indicates:   Allergen Reactions    Codeine Nausea Only     weak     Past Surgical History:   Procedure Laterality Date    broken elbow      left    COLONOSCOPY N/A 10/4/2017    Procedure: COLONOSCOPY;  Surgeon: Gabriel Leung MD;  Location: [facility];  Service: Endoscopy;  Laterality: N/A;    EYE SURGERY      cataract    heart bypass      2004    HERNIA REPAIR      right    ROTATOR CUFF REPAIR      right  2004     Family History   Problem Relation Age of Onset    Arthritis Mother     Diabetes Mother     Stroke Mother     Heart attack Father     Cancer Brother     Throat cancer Brother     Diabetes Maternal Aunt     Diabetes Maternal Uncle     Heart disease Maternal Uncle     Diabetes Paternal Aunt     Heart disease Paternal Aunt     Heart disease Paternal Uncle     Heart disease Maternal Grandmother     Cancer Maternal Grandfather      Social History     Socioeconomic History    Marital status:     Number of children: 4    Years of education: GED   Occupational History    Occupation: retired tug    Tobacco Use    Smoking status: Former Smoker     Packs/day: 3.00     Years: 45.00     Pack years: 135.00     Types: Cigarettes     Quit date: 4/20/2004     Years since quitting:  "17.7    Smokeless tobacco: Never Used   Substance and Sexual Activity    Alcohol use: Yes     Comment: was heavy drinker 35 years ago, rare use now    Drug use: No    Sexual activity: Yes     Partners: Female     Review of Systems   Constitution: Negative for chills, fever and weakness.   Cardiovascular: Negative for claudication and leg swelling.   Respiratory: Positive for cough. Negative for shortness of breath.    Skin: Positive for dry skin and nail changes. Negative for itching and rash.   Musculoskeletal: Positive for arthritis, back pain and joint pain. Negative for falls, joint swelling and muscle weakness.   Gastrointestinal: Negative for diarrhea, nausea and vomiting.   Neurological: Positive for numbness and paresthesias. Negative for tremors.   Psychiatric/Behavioral: Negative for altered mental status and hallucinations.           Objective:       Vitals:    02/01/22 0814   Weight: 76.7 kg (169 lb 1.5 oz)   Height: 5' 8" (1.727 m)   PainSc: 0-No pain       Physical Exam   Constitutional:   General: Pt. is well-developed, well-nourished, appears stated age, in no acute distress, alert and oriented x 3. No evidence of depression, anxiety, or agitation. Calm, cooperative, and communicative. Appropriate interactions and affect.       Cardiovascular:   Pulses:       Dorsalis pedis pulses are 2+ on the right side, and 2+ on the left side.        Posterior tibial pulses are 1+ on the right side, and 1+ on the left side.   There is decreased digital hair.   Musculoskeletal:        Right foot: There is normal range of motion.        Left foot:  There is normal range of motion.   Muscle strength is 5/5 in all groups bilaterally.    Decreased stride, station of gait.  apropulsive toe off.  Increased angle and base of gait.    Patient has hammertoes of digits 2-5 bilateral partially reducible without symptom today.    Visible and palpable bunion without pain at dorsomedial 1st metatarsal head right and left.  " Hallux abducted right and left partially reducible, tracks laterally without being track bound.  No ecchymosis, erythema, edema, or cardinal signs infection or signs of trauma same foot.     Neurological: A sensory deficit is present.   Millwood-Keon 5.07 monofilamant testing is diminished Farhad feet. Sharp/dull sensation diminished Bilaterally   Skin: Skin is warm, dry. No abrasion, no ecchymosis, no rash noted. He is not diaphoretic. No cyanosis. No pallor. Nails show no clubbing.   Medial and lateral hallux nail margin of right foot with ingrown nail plate and thick HPK. Surrounding erythema and minimal edema is noted there is no granuloma formation noted. no malodor     Toenails 1-5 bilaterally are elongated by 2-3 mm, thickened by 2-3 mm, discolored/yellowed, dystrophic, brittle with subungual debris.    Focal hyperkeratotic lesion consisting entirely of hyperkeratotic tissue without open skin, drainage, pus, fluctuance, malodor, or signs of infection: medial IPJ of hallux farhad    Interdigital Spaces clean, dry and without evidence of break in skin integrity   Psychiatric: He has a normal mood and affect. His speech is normal.   Nursing note and vitals reviewed.        Assessment:       Encounter Diagnoses   Name Primary?    Type 2 diabetes, uncontrolled, with peripheral circulatory disorder Yes    Type II diabetes mellitus with neurological manifestations     Hammer toes of both feet     Hallux abducto valgus, left     Hallux abducto valgus, right     Ingrown nail     Onychomycosis due to dermatophyte     Corn or callus          Plan:       Percy was seen today for diabetes mellitus and nail care.    Diagnoses and all orders for this visit:    Type 2 diabetes, uncontrolled, with peripheral circulatory disorder    Type II diabetes mellitus with neurological manifestations    Hammer toes of both feet    Hallux abducto valgus, left    Hallux abducto valgus, right    Ingrown nail    Onychomycosis due to  dermatophyte    Corn or callus      I counseled the patient on his conditions, their implications and medical management.      - Shoe inspection. Diabetic Foot Education. Patient reminded of the importance of good nutrition and blood sugar control to help prevent podiatric complications of diabetes. Patient instructed on proper foot hygeine. We discussed wearing proper shoe gear, daily foot inspections, never walking without protective shoe gear, never putting sharp instruments to feet    - With patient's permission, nails were aggressively reduced and debrided x 10 to their soft tissue attachment mechanically and with electric , removing all offending nail and debris. Patient relates relief following the procedure. Utilizing sterile toenail clippers I aggressively trimmed  the offending right hallux  nail border approximately 3 mm from its edge and carried the nail plate incision down at an angle in order to wedge out the offending cryptotic portion of the nail plate. The offending border was then removed in toto. The remaining nail was grinded down with an electric  down to nail bed.  Silver nitrate to any abrasions. Patient tolerated the procedure well and related significant relief.    - After cleansing the  area w/ alcohol prep pad the above mentioned hyperkeratosis was trimmed utilizing No 15 scapel, to a smooth base with out incident. Patient tolerated this  well and reported comfort to the area of medial hallux IPJ concha    - He will continue to monitor the areas daily, inspect his feet, wear protective shoe gear when ambulatory, moisturizer to maintain skin integrity and follow in this office in approximately 2-3 months, sooner PRN

## 2022-02-01 NOTE — PATIENT INSTRUCTIONS
Over the counter pain creams: Voltaren Gel, Biofreeze, Bengay, tiger balm, two old goat, lidocaine gel,  Absorbine Veterinary Liniment Gel Topical Analgesic Sore Muscle and Joint Pain Relief    Recommend lotions: eucerin, eucerin for diabetics, aquaphor, A&D ointment, gold bond for diabetics, sween, Lizton's Bees all purpose baby ointment,  urea 40 with aloe (found on amazon.com)    Shoe recommendations: (try 6pm.com, zappos.com , nordstromrack.BoldIQ, or shoes.com for discounted prices) you can visit [employer] shoes in [place]  or [facility] in the [facility] (there are also several shoe brand outlets in the [facility])    Asics (GT 2000 or gel foundations), new balance stability type shoes (such as the 940 series), saucony (stabil c3),  Mahoney (GTS or Beast or transcend), propet (tennis shoe)    rYn Sousa (women) Gustabo&Billy (men), clarks, crocs, aerosoles, naturalizers, SAS, ecco, born, ngoc lara, rockports (dress shoes)    Vionic, burkenstocks, fitflops, propet (sandals)  Nike comfort thong sandals, crocs, propet (house shoes)    Nail Home remedy:  Vicks Vapor rub or Emuaid to nails for easier manageability    Diabetes: Inspecting Your Feet  Diabetes increases your chances of developing foot problems. So inspect your feet every day. This helps you find small skin irritations before they become serious infections. If you have trouble seeing the bottoms of your feet, use a mirror or ask a family member or friend to help.     Pressure spots on the bottom of the foot are common areas where problems develop.   How to check your feet  Below are tips to help you look for foot problems. Try to check your feet at the same time each day, such as when you get out of bed in the morning:  · Check the top of each foot. The tops of toes, back of the heel, and outer edge of the foot can get a lot of rubbing from poor-fitting shoes.  · Check the bottom of each foot. Daily wear and tear often leads to problems at pressure  spots.  · Check the toes and nails. Fungal infections often occur between toes. Toenail problems can also be a sign of fungal infections or lead to breaks in the skin.  · Check your shoes, too. Loose objects inside a shoe can injure the foot. Use your hand to feel inside your shoes for things like alfredo, loose stitching, or rough areas that could irritate your skin.  Warning signs  Look for any color changes in the foot. Redness with streaks can signal a severe infection, which needs immediate medical attention. Tell your doctor right away if you have any of these problems:  · Swelling, sometimes with color changes, may be a sign of poor blood flow or infection. Symptoms include tenderness and an increase in the size of your foot.  · Warm or hot areas on your feet may be signs of infection. A foot that is cold may not be getting enough blood.  · Sensations such as burning, tingling, or pins and needles can be signs of a problem. Also check for areas that may be numb.  · Hot spots are caused by friction or pressure. Look for hot spots in areas that get a lot of rubbing. Hot spots can turn into blisters, calluses, or sores.  · Cracks and sores are caused by dry or irritated skin. They are a sign that the skin is breaking down, which can lead to infection.  · Toenail problems to watch for include nails growing into the skin (ingrown toenail) and causing redness or pain. Thick, yellow, or discolored nails can signal a fungal infection.  · Drainage and odor can develop from untreated sores and ulcers. Call your doctor right away if you notice white or yellow drainage, bleeding, or unpleasant odor.   © 6110-2917 [employer]. [address] [place] [address]. All rights reserved. This information is not intended as a substitute for professional medical care. Always follow your healthcare professional's instructions.        Step-by-Step:  Inspecting Your Feet (Diabetes)    Date Last Reviewed:  10/1/2016  © 5398-9727 [employer], [employer]. [address] [place] [address], [place] [address]. All rights reserved. This information is not intended as a substitute for professional medical care. Always follow your healthcare professional's instructions.

## 2022-02-15 ENCOUNTER — PES CALL (OUTPATIENT)
Dept: ADMINISTRATIVE | Facility: CLINIC | Age: 82
End: 2022-02-15
Payer: MEDICARE

## 2022-02-28 ENCOUNTER — TELEPHONE (OUTPATIENT)
Dept: FAMILY MEDICINE | Facility: CLINIC | Age: 82
End: 2022-02-28
Payer: MEDICARE

## 2022-02-28 NOTE — TELEPHONE ENCOUNTER
----- Message from Eveline Dominguez MA sent at 2/28/2022 11:34 AM CST -----  Regarding: pt requesting a call back  Name of Who is Calling: JAILENE JIMENES [[id number]]           What is the request in detail: pt requesting a call back needs to speak with someone in office about scheduling a procedure            Can the clinic reply by [facility]: N           What [facility]: [phone]

## 2022-03-08 LAB
CHOL/HDLC RATIO: 2.5
CHOLESTEROL, TOTAL: 81
CREATININE RANDOM URINE: 72 MG/DL (ref 20–320)
EGFR IF AFRICAN AMERICAN: 63 ML/MIN/1.73 M2
EST. GFR  (NON AFRICAN AMERICAN): 54 ML/MIN/1.73 M2
HBA1C MFR BLD: 8.3 % (ref 4–5.6)
HDLC SERPL-MCNC: 32 MG/DL
LDLC SERPL CALC-MCNC: 32 MG/DL
MICROALBUMIN URINE RANDOM: 13.8
MICROALBUMIN/CREATININE RATIO: 192 UG/MG
NONHDLC SERPL-MCNC: 49 MG/DL
TRIGL SERPL-MCNC: 88 MG/DL

## 2022-03-23 ENCOUNTER — TELEPHONE (OUTPATIENT)
Dept: FAMILY MEDICINE | Facility: CLINIC | Age: 82
End: 2022-03-23
Payer: MEDICARE

## 2022-03-23 DIAGNOSIS — M17.0 PRIMARY OSTEOARTHRITIS OF BOTH KNEES: ICD-10-CM

## 2022-03-23 NOTE — TELEPHONE ENCOUNTER
----- Message from Amy Matthews sent at 3/21/2022 10:56 AM CDT -----  Type: Patient Call Back    Who called: [facility] [phone]    What is the request in detail:requesting to get signed order and clinical notes for a cane. Call Que. Fax no [phone]    Can the clinic reply by [facility]?    Would the patient rather a call back or a response via [facility]? call    Best call back number:    Additional Information:

## 2022-03-23 NOTE — TELEPHONE ENCOUNTER
Created order for home cane based on older order.   Will fax to [facility] when signed.

## 2022-03-29 PROBLEM — H35.3211 EXUDATIVE AGE-RELATED MACULAR DEGENERATION, RIGHT EYE, WITH ACTIVE CHOROIDAL NEOVASCULARIZATION: Status: ACTIVE | Noted: 2022-03-29

## 2022-04-01 ENCOUNTER — LAB VISIT (OUTPATIENT)
Dept: LAB | Facility: HOSPITAL | Age: 82
End: 2022-04-01
Attending: INTERNAL MEDICINE
Payer: MEDICARE

## 2022-04-01 LAB
ESTIMATED AVG GLUCOSE: 192 MG/DL (ref 68–131)
HBA1C MFR BLD: 8.3 % (ref 4–5.6)

## 2022-04-01 PROCEDURE — 83036 HEMOGLOBIN GLYCOSYLATED A1C: CPT | Performed by: INTERNAL MEDICINE

## 2022-04-01 PROCEDURE — 36415 COLL VENOUS BLD VENIPUNCTURE: CPT | Mod: PO | Performed by: INTERNAL MEDICINE

## 2022-04-04 ENCOUNTER — HOSPITAL ENCOUNTER (EMERGENCY)
Facility: HOSPITAL | Age: 82
Discharge: HOME | End: 2022-04-04
Attending: EMERGENCY MEDICINE
Payer: MEDICARE

## 2022-04-04 ENCOUNTER — OFFICE VISIT (OUTPATIENT)
Dept: FAMILY MEDICINE | Facility: CLINIC | Age: 82
End: 2022-04-04
Payer: MEDICARE

## 2022-04-04 VITALS
TEMPERATURE: 98 F | OXYGEN SATURATION: 95 % | HEART RATE: 48 BPM | DIASTOLIC BLOOD PRESSURE: 70 MMHG | SYSTOLIC BLOOD PRESSURE: 158 MMHG | BODY MASS INDEX: 26.16 KG/M2 | WEIGHT: 172.63 LBS | HEIGHT: 68 IN

## 2022-04-04 VITALS
HEART RATE: 62 BPM | BODY MASS INDEX: 26.76 KG/M2 | DIASTOLIC BLOOD PRESSURE: 67 MMHG | OXYGEN SATURATION: 97 % | RESPIRATION RATE: 18 BRPM | SYSTOLIC BLOOD PRESSURE: 128 MMHG | WEIGHT: 176 LBS | TEMPERATURE: 98 F

## 2022-04-04 DIAGNOSIS — I20.89 CHRONIC STABLE ANGINA: ICD-10-CM

## 2022-04-04 DIAGNOSIS — I48.19 PERSISTENT ATRIAL FIBRILLATION: ICD-10-CM

## 2022-04-04 DIAGNOSIS — G31.84 MCI (MILD COGNITIVE IMPAIRMENT): ICD-10-CM

## 2022-04-04 DIAGNOSIS — R51.9 NONINTRACTABLE HEADACHE, UNSPECIFIED CHRONICITY PATTERN, UNSPECIFIED HEADACHE TYPE: ICD-10-CM

## 2022-04-04 DIAGNOSIS — I12.9 BENIGN HYPERTENSION WITH CHRONIC KIDNEY DISEASE, STAGE III: ICD-10-CM

## 2022-04-04 DIAGNOSIS — G47.33 OBSTRUCTIVE SLEEP APNEA TREATED WITH BIPAP: ICD-10-CM

## 2022-04-04 DIAGNOSIS — N25.81 SECONDARY HYPERPARATHYROIDISM OF RENAL ORIGIN: ICD-10-CM

## 2022-04-04 DIAGNOSIS — I50.42 CHRONIC COMBINED SYSTOLIC AND DIASTOLIC HEART FAILURE: ICD-10-CM

## 2022-04-04 DIAGNOSIS — E78.5 HYPERLIPIDEMIA LDL GOAL <100: ICD-10-CM

## 2022-04-04 DIAGNOSIS — E66.3 OVERWEIGHT (BMI 25.0-29.9): ICD-10-CM

## 2022-04-04 DIAGNOSIS — D69.2 SENILE PURPURA: ICD-10-CM

## 2022-04-04 DIAGNOSIS — J84.9 ILD (INTERSTITIAL LUNG DISEASE): ICD-10-CM

## 2022-04-04 DIAGNOSIS — N18.30 BENIGN HYPERTENSION WITH CHRONIC KIDNEY DISEASE, STAGE III: ICD-10-CM

## 2022-04-04 DIAGNOSIS — I25.10 CORONARY ARTERY DISEASE, UNSPECIFIED VESSEL OR LESION TYPE, UNSPECIFIED WHETHER ANGINA PRESENT, UNSPECIFIED WHETHER NATIVE OR TRANSPLANTED HEART: ICD-10-CM

## 2022-04-04 DIAGNOSIS — J43.9 COPD WITH CHRONIC BRONCHITIS AND EMPHYSEMA: ICD-10-CM

## 2022-04-04 DIAGNOSIS — R51.9 NONINTRACTABLE HEADACHE, UNSPECIFIED CHRONICITY PATTERN, UNSPECIFIED HEADACHE TYPE: Primary | ICD-10-CM

## 2022-04-04 DIAGNOSIS — I70.0 ATHEROSCLEROSIS OF ABDOMINAL AORTA: ICD-10-CM

## 2022-04-04 DIAGNOSIS — F41.1 ANXIETY STATE: ICD-10-CM

## 2022-04-04 DIAGNOSIS — Z00.00 ROUTINE MEDICAL EXAM: Primary | ICD-10-CM

## 2022-04-04 DIAGNOSIS — F33.0 MAJOR DEPRESSIVE DISORDER, RECURRENT EPISODE, MILD: ICD-10-CM

## 2022-04-04 DIAGNOSIS — J44.89 COPD WITH CHRONIC BRONCHITIS AND EMPHYSEMA: ICD-10-CM

## 2022-04-04 DIAGNOSIS — H35.3211 EXUDATIVE AGE-RELATED MACULAR DEGENERATION, RIGHT EYE, WITH ACTIVE CHOROIDAL NEOVASCULARIZATION: ICD-10-CM

## 2022-04-04 DIAGNOSIS — Z99.81 CHRONIC RESPIRATORY FAILURE WITH HYPOXIA, ON HOME O2 THERAPY: ICD-10-CM

## 2022-04-04 DIAGNOSIS — J96.11 CHRONIC RESPIRATORY FAILURE WITH HYPOXIA, ON HOME O2 THERAPY: ICD-10-CM

## 2022-04-04 DIAGNOSIS — Z99.89 AMBULATES WITH CANE: ICD-10-CM

## 2022-04-04 PROBLEM — Z86.73 HISTORY OF STROKE: Status: ACTIVE | Noted: 2022-04-04

## 2022-04-04 LAB — POCT GLUCOSE: 87 MG/DL (ref 70–110)

## 2022-04-04 PROCEDURE — 3078F PR MOST RECENT DIASTOLIC BLOOD PRESSURE < 80 MM HG: ICD-10-PCS | Mod: CPTII,S$GLB,, | Performed by: INTERNAL MEDICINE

## 2022-04-04 PROCEDURE — 1125F AMNT PAIN NOTED PAIN PRSNT: CPT | Mod: CPTII,S$GLB,, | Performed by: INTERNAL MEDICINE

## 2022-04-04 PROCEDURE — 99999 PR PBB SHADOW E&M-EST. PATIENT-LVL V: CPT | Mod: PBBFAC,,, | Performed by: INTERNAL MEDICINE

## 2022-04-04 PROCEDURE — 1101F PT FALLS ASSESS-DOCD LE1/YR: CPT | Mod: CPTII,S$GLB,, | Performed by: INTERNAL MEDICINE

## 2022-04-04 PROCEDURE — 82962 GLUCOSE BLOOD TEST: CPT | Mod: ER

## 2022-04-04 PROCEDURE — 99499 RISK ADDL DX/OHS AUDIT: ICD-10-PCS | Mod: S$GLB,,, | Performed by: INTERNAL MEDICINE

## 2022-04-04 PROCEDURE — 1160F PR REVIEW ALL MEDS BY PRESCRIBER/CLIN PHARMACIST DOCUMENTED: ICD-10-PCS | Mod: CPTII,S$GLB,, | Performed by: INTERNAL MEDICINE

## 2022-04-04 PROCEDURE — 3052F HG A1C>EQUAL 8.0%<EQUAL 9.0%: CPT | Mod: CPTII,S$GLB,, | Performed by: INTERNAL MEDICINE

## 2022-04-04 PROCEDURE — 99499 UNLISTED E&M SERVICE: CPT | Mod: S$GLB,,, | Performed by: INTERNAL MEDICINE

## 2022-04-04 PROCEDURE — 1157F PR ADVANCE CARE PLAN OR EQUIV PRESENT IN MEDICAL RECORD: ICD-10-PCS | Mod: CPTII,S$GLB,, | Performed by: INTERNAL MEDICINE

## 2022-04-04 PROCEDURE — 1159F MED LIST DOCD IN RCRD: CPT | Mod: CPTII,S$GLB,, | Performed by: INTERNAL MEDICINE

## 2022-04-04 PROCEDURE — 1160F RVW MEDS BY RX/DR IN RCRD: CPT | Mod: CPTII,S$GLB,, | Performed by: INTERNAL MEDICINE

## 2022-04-04 PROCEDURE — 1101F PR PT FALLS ASSESS DOC 0-1 FALLS W/OUT INJ PAST YR: ICD-10-PCS | Mod: CPTII,S$GLB,, | Performed by: INTERNAL MEDICINE

## 2022-04-04 PROCEDURE — 99397 PER PM REEVAL EST PAT 65+ YR: CPT | Mod: S$GLB,,, | Performed by: INTERNAL MEDICINE

## 2022-04-04 PROCEDURE — 3077F SYST BP >= 140 MM HG: CPT | Mod: CPTII,S$GLB,, | Performed by: INTERNAL MEDICINE

## 2022-04-04 PROCEDURE — 3288F FALL RISK ASSESSMENT DOCD: CPT | Mod: CPTII,S$GLB,, | Performed by: INTERNAL MEDICINE

## 2022-04-04 PROCEDURE — 3052F PR MOST RECENT HEMOGLOBIN A1C LEVEL 8.0 - < 9.0%: ICD-10-PCS | Mod: CPTII,S$GLB,, | Performed by: INTERNAL MEDICINE

## 2022-04-04 PROCEDURE — 3077F PR MOST RECENT SYSTOLIC BLOOD PRESSURE >= 140 MM HG: ICD-10-PCS | Mod: CPTII,S$GLB,, | Performed by: INTERNAL MEDICINE

## 2022-04-04 PROCEDURE — 99284 EMERGENCY DEPT VISIT MOD MDM: CPT | Mod: 25,ER

## 2022-04-04 PROCEDURE — 3288F PR FALLS RISK ASSESSMENT DOCUMENTED: ICD-10-PCS | Mod: CPTII,S$GLB,, | Performed by: INTERNAL MEDICINE

## 2022-04-04 PROCEDURE — 1125F PR PAIN SEVERITY QUANTIFIED, PAIN PRESENT: ICD-10-PCS | Mod: CPTII,S$GLB,, | Performed by: INTERNAL MEDICINE

## 2022-04-04 PROCEDURE — 1159F PR MEDICATION LIST DOCUMENTED IN MEDICAL RECORD: ICD-10-PCS | Mod: CPTII,S$GLB,, | Performed by: INTERNAL MEDICINE

## 2022-04-04 PROCEDURE — 99397 PR PREVENTIVE VISIT,EST,65 & OVER: ICD-10-PCS | Mod: S$GLB,,, | Performed by: INTERNAL MEDICINE

## 2022-04-04 PROCEDURE — 3078F DIAST BP <80 MM HG: CPT | Mod: CPTII,S$GLB,, | Performed by: INTERNAL MEDICINE

## 2022-04-04 PROCEDURE — 1157F ADVNC CARE PLAN IN RCRD: CPT | Mod: CPTII,S$GLB,, | Performed by: INTERNAL MEDICINE

## 2022-04-04 PROCEDURE — 99999 PR PBB SHADOW E&M-EST. PATIENT-LVL V: ICD-10-PCS | Mod: PBBFAC,,, | Performed by: INTERNAL MEDICINE

## 2022-04-04 RX ORDER — FUROSEMIDE 40 MG/1
40 TABLET ORAL DAILY
COMMUNITY
Start: 2022-03-22

## 2022-04-04 RX ORDER — DICLOFENAC SODIUM 10 MG/G
2 GEL TOPICAL 3 TIMES DAILY
Qty: 50 G | Refills: 0 | Status: SHIPPED | OUTPATIENT
Start: 2022-04-04

## 2022-04-04 NOTE — FIRST PROVIDER EVALUATION
" Emergency Department TeleTriage Encounter Note      CHIEF COMPLAINT    Chief Complaint   Patient presents with    Headache     Pt states," I have a headache and my drMonica Wants me to get a scan."       VITAL SIGNS   Initial Vitals [04/04/22 1216]   BP Pulse Resp Temp SpO2   139/64 63 16 97.9 °F (36.6 °C) 95 %      MAP       --            ALLERGIES    Review of patient's allergies indicates:   Allergen Reactions    Aricept odt [donepezil] Diarrhea and Nausea And Vomiting    Codeine Nausea Only     weak    Ranexa [ranolazine]        PROVIDER TRIAGE NOTE  This is a teletriage evaluation of a 81 y.o. male presenting to the ED complaining of right-sided headache for three days. Denies trauma, numbness/tingling, and visual changes.     Initial orders will be placed and care will be transferred to an alternate provider when patient is roomed for a full evaluation. Any additional orders and the final disposition will be determined by that provider.           ORDERS  Labs Reviewed - No data to display    ED Orders (720h ago, onward)    None            Virtual Visit Note: The provider triage portion of this emergency department evaluation and documentation was performed via [employer], a HIPAA-compliant telemedicine application, in concert with a tele-presenter in the room. A face to face patient evaluation with one of my colleagues will occur once the patient is placed in an emergency department room.      DISCLAIMER: This note was prepared with [employer] voice recognition transcription software. Garbled syntax, mangled pronouns, and other bizarre constructions may be attributed to that software system.  "

## 2022-04-04 NOTE — ED PROVIDER NOTES
"Encounter Date: 4/4/2022    SCRIBE #1 NOTE: I, Joao Cherry, am scribing for, and in the presence of,  Lesvia Castorena MD. I have scribed the following portions of the note - Other sections scribed: HPI,ROS,PE.       History     Chief Complaint   Patient presents with    Headache     Pt states," I have a headache and my dr. Wants me to get a scan."     Patient is a 81 year old male with PMHx of CAD, HLD, HTN, and Type II DM who presents to ED with complaints of pain in his head that began 3 days ago.  He notes it feels like "something smacked him on the top of the head".  Pain is rated 10/10 and is constant. Patient has been taking Tylenol with no relief. No known injuries or trauma. Patient denies associated symptoms of visual disturbances, fever, or vomiting. He denies alcohol, tobacco, or recreational drug use. No other complaints at this time.     The history is provided by the patient. No  was used.     Review of patient's allergies indicates:   Allergen Reactions    Aricept odt [donepezil] Diarrhea and Nausea And Vomiting    Codeine Nausea Only     weak    Ranexa [ranolazine]      Past Medical History:   Diagnosis Date    Allergy     Arthritis     CAD, multiple vessel     DR Melissa    Cataract     Depression     History of colon polyps     Hyperlipidemia     Hypertension     Macular degeneration     Dr Razo for injection, Jennifer for eye    S/P CABG x 2     Type 2 diabetes mellitus with circulatory disorder     Dr. Aquino     Past Surgical History:   Procedure Laterality Date    broken elbow      left    COLONOSCOPY N/A 10/4/2017    Procedure: COLONOSCOPY;  Surgeon: Gabriel Leung MD;  Location: [facility];  Service: Endoscopy;  Laterality: N/A;    EYE SURGERY      cataract    heart bypass      2004    HERNIA REPAIR      right    ROTATOR CUFF REPAIR      right  2004     Family History   Problem Relation Age of Onset    Arthritis Mother     Diabetes Mother     Stroke " Mother     Heart attack Father     Cancer Brother     Throat cancer Brother     Diabetes Maternal Aunt     Diabetes Maternal Uncle     Heart disease Maternal Uncle     Diabetes Paternal Aunt     Heart disease Paternal Aunt     Heart disease Paternal Uncle     Heart disease Maternal Grandmother     Cancer Maternal Grandfather      Social History     Tobacco Use    Smoking status: Former Smoker     Packs/day: 3.00     Years: 45.00     Pack years: 135.00     Types: Cigarettes     Quit date: 2004     Years since quittin.9    Smokeless tobacco: Never Used   Substance Use Topics    Alcohol use: Yes     Comment: was heavy drinker 35 years ago, rare use now    Drug use: No     Review of Systems   Constitutional: Negative for activity change, appetite change, chills and fever.   HENT: Negative for congestion, rhinorrhea, sneezing and sore throat.    Eyes: Negative for visual disturbance.   Respiratory: Negative for cough, chest tightness, shortness of breath and wheezing.    Cardiovascular: Negative for chest pain and palpitations.   Gastrointestinal: Negative for abdominal pain, diarrhea, nausea and vomiting.   Skin: Negative for rash.   Neurological: Positive for headaches. Negative for dizziness, syncope, speech difficulty, weakness, light-headedness and numbness.   All other systems reviewed and are negative.      Physical Exam     Initial Vitals [22 1216]   BP Pulse Resp Temp SpO2   139/64 63 16 97.9 °F (36.6 °C) 95 %      MAP       --         Physical Exam    Nursing note and vitals reviewed.  Constitutional: He appears well-developed and well-nourished. No distress.   HENT:   Head: Normocephalic and atraumatic.       Tenderness to right parietal scalp without  Erythema, bruising or skin lesions.  Sensitive to light touch.   Eyes: Conjunctivae are normal.   Neck:   Normal range of motion.  Cardiovascular: Normal rate and regular rhythm.   No murmur heard.  Pulmonary/Chest: Breath sounds  normal. No respiratory distress.   Abdominal: Bowel sounds are normal. He exhibits no distension.   Musculoskeletal:         General: Normal range of motion.      Cervical back: Normal range of motion.     Neurological: He is alert and oriented to person, place, and time.   Skin: Skin is warm and dry.   Psychiatric: He has a normal mood and affect. His behavior is normal.         ED Course   Procedures  Labs Reviewed   POCT GLUCOSE, HAND-HELD DEVICE   POCT GLUCOSE          Imaging Results          CT Head Without Contrast (Final result)  Result time 04/04/22 13:17:19    Final result by Thierno Liriano MD (04/04/22 13:17:19)                 Impression:      Generalized volume loss and remote right occipital lobe infarct.  No acute findings or significant change compared with the prior MRI.      Electronically signed by: Thierno Liriano MD  Date:    04/04/2022  Time:    13:17             Narrative:    EXAMINATION:  CT HEAD WITHOUT CONTRAST    CLINICAL HISTORY:  Headache, new or worsening (Age >= 50y);    TECHNIQUE:  Low dose axial images were obtained through the head.  Coronal and sagittal reformations were also performed. Contrast was not administered.    COMPARISON:  MRI 06/14/2018    FINDINGS:  Noncontrast head CT demonstrates volume loss greater than expected for the patient's age.  There is remote cystic encephalomalacia in the right medial occipital lobe likely from old infarct, also seen on the prior MRI.  White matter changes are seen, similar to the prior study.  No edema, mass effect, intracranial hemorrhage, or extra-axial fluid collection.  Atherosclerotic calcification present in basilar artery and internal carotid arteries.                                 Medications - No data to display  Medical Decision Making:   History:   Old Medical Records: I decided to obtain old medical records.  Clinical Tests:   Lab Tests: Ordered and Reviewed  Radiological Study: Reviewed and Ordered  ED Management:  Head  pain, pt is adamant this is not a headache.  Pain is reproducible with light touch of top of head.  No skin changes.  Neurologically intact.  Head CT with no acute findings to explain pain.  Will treat with voltaren gel, encouraged gabapentin use, monitor skin for changes.          Scribe Attestation:   Scribe #1: I performed the above scribed service and the documentation accurately describes the services I performed. I attest to the accuracy of the note.                 I, Dr. Lesvia Castorena, personally performed the services described in this documentation.   All medical record entries made by the scribe were at my direction and in my presence.   I have reviewed the chart and agree that the record is accurate and complete.   Lesvia Castorena MD.  1:35 PM 04/04/2022   Clinical Impression:   Final diagnoses:  [R51.9] Nonintractable headache, unspecified chronicity pattern, unspecified headache type (Primary)          ED Disposition Condition    Discharge Stable        ED Prescriptions     Medication Sig Dispense Start Date End Date Auth. Provider    diclofenac sodium (VOLTAREN) 1 % Gel Apply 2 g topically 3 (three) times daily. 50 g 4/4/2022  Lesvia Castorena MD        Follow-up Information     Follow up With Specialties Details Why Contact Info    Kasie Edmondson MD Internal Medicine, Pediatrics  As needed [address] [address] [facility]  [place] [address]  [phone]             Lesvia Castorena MD  04/04/22 1588

## 2022-04-04 NOTE — DISCHARGE INSTRUCTIONS
Be sure and take your gabapentin every day.  Monitor your head for redness, rash or other skin lesions.  Apply voltaren cream and ice to area of pain.  See your doctor if you are not better with this treatment.

## 2022-04-04 NOTE — PROGRESS NOTES
HISTORY OF PRESENT ILLNESS:  Percy Ramirez is a 81 y.o. male who presents to the clinic today for a routine medical physical exam. His last physical exam was approximately 1 years(s) ago.      PAST MEDICAL HISTORY:  Past Medical History:   Diagnosis Date    Allergy     Arthritis     CAD, multiple vessel     DR Melissa    Cataract     Depression     History of colon polyps     Hyperlipidemia     Hypertension     Macular degeneration     Dr Razo for injection, Jennifer for eye    S/P CABG x 2     Type 2 diabetes mellitus with circulatory disorder     Dr. Aquino       PAST SURGICAL HISTORY:  Past Surgical History:   Procedure Laterality Date    broken elbow      left    COLONOSCOPY N/A 10/4/2017    Procedure: COLONOSCOPY;  Surgeon: Gabriel Leung MD;  Location: [facility];  Service: Endoscopy;  Laterality: N/A;    EYE SURGERY      cataract    heart bypass      2004    HERNIA REPAIR      right    ROTATOR CUFF REPAIR      right         SOCIAL HISTORY:  Social History     Socioeconomic History    Marital status:     Number of children: 4    Years of education: GED   Occupational History    Occupation: retired [employer]    Tobacco Use    Smoking status: Former Smoker     Packs/day: 3.00     Years: 45.00     Pack years: 135.00     Types: Cigarettes     Quit date: 2004     Years since quittin.9    Smokeless tobacco: Never Used   Substance and Sexual Activity    Alcohol use: Yes     Comment: was heavy drinker 35 years ago, rare use now    Drug use: No    Sexual activity: Yes     Partners: Female       FAMILY HISTORY:  Family History   Problem Relation Age of Onset    Arthritis Mother     Diabetes Mother     Stroke Mother     Heart attack Father     Cancer Brother     Throat cancer Brother     Diabetes Maternal Aunt     Diabetes Maternal Uncle     Heart disease Maternal Uncle     Diabetes Paternal Aunt     Heart disease Paternal Aunt     Heart disease  "Paternal Uncle     Heart disease Maternal Grandmother     Cancer Maternal Grandfather        ALLERGIES AND MEDICATIONS: updated and reviewed.  Review of patient's allergies indicates:   Allergen Reactions    Aricept odt [donepezil] Diarrhea and Nausea And Vomiting    Codeine Nausea Only     weak    Ranexa [ranolazine]      Medication List with Changes/Refills   Current Medications    ACETAMINOPHEN (TYLENOL) 325 MG TABLET    Take 2 tablets (650 mg total) by mouth every 6 (six) hours as needed.    ALBUTEROL (PROVENTIL/VENTOLIN HFA) 90 MCG/ACTUATION INHALER    [facility]    ATORVASTATIN (LIPITOR) 40 MG TABLET    Take 40 mg by mouth once daily.    BD INSULIN PEN NEEDLE UF SHORT 31 GAUGE X 5/16" NDLE        BD INSULIN SYRINGE ULTRA-FINE 1/2 ML 31 GAUGE X 15/64" SYRG        BLOOD SUGAR DIAGNOSTIC STRP    To check BG 3 times daily, to use with insurance preferred meter    BLOOD-GLUCOSE METER KIT    To check BG 3 times daily, to use with insurance preferred meter    CARVEDILOL (COREG) 25 MG TABLET    Take 1 tablet (25 mg total) by mouth 2 (two) times daily.    CINNAMON BARK 500 MG CAPSULE    Take 1,000 mg by mouth once daily.      CLOPIDOGREL (PLAVIX) 75 MG TABLET    Take 75 mg by mouth.    DICLOFENAC SODIUM (VOLTAREN) 1 % GEL    Apply 2 g topically 2 (two) times daily.    DULOXETINE (CYMBALTA) 60 MG CAPSULE    Take 1 capsule (60 mg total) by mouth once daily.    ENTRESTO  MG PER TABLET    Take 1 tablet by mouth 2 (two) times daily.    FLUTICASONE PROPIONATE (FLONASE) 50 MCG/ACTUATION NASAL SPRAY    1 spray (50 mcg total) by Each Nostril route 2 (two) times daily.    FUROSEMIDE (LASIX) 40 MG TABLET    Take 40 mg by mouth once daily.    GABAPENTIN (NEURONTIN) 300 MG CAPSULE    Take 4 capsules (1,200 mg total) by mouth 2 (two) times daily.    GLIMEPIRIDE (AMARYL) 4 MG TABLET    Take 4 mg by mouth daily with breakfast.     HYDROCODONE-ACETAMINOPHEN (NORCO) 5-325 MG PER TABLET    Take 1 tablet by mouth every 4 (four) " "hours as needed for Pain. Causes drowsiness. Do not drive or operate machinery with this medication. Do not drink alcohol with this medication. Causes constipation. Take with stool softener.    INSULIN NPH-INSULIN REGULAR, 70/30, (NOVOLIN 70/30) 100 UNIT/ML (70-30) INJECTION    Inject into the skin. Inject 10 units at breakfast and inject 10 units at night    INSULIN SYRINGE-NEEDLE U-100 0.3 ML 31 GAUGE X 15/64" SYRG    USE TO INJECT INSULIN THREE TIMES DAILY    INSULIN SYRINGE-NEEDLE U-100 1/2 ML 31 X 5/16" SYRG        ISOSORBIDE MONONITRATE (IMDUR) 30 MG 24 HR TABLET    Take 30 mg by mouth once daily.    LANCETS MISC    To check BG 3 times daily, to use with insurance preferred meter    LIDOCAINE (LIDODERM) 5 %    Place 1 patch onto the skin once daily. Remove & Discard patch within 12 hours or as directed by MD    METFORMIN (GLUCOPHAGE) 500 MG TABLET    Take 1,000 mg by mouth 2 (two) times daily with meals. 2 Tablets in the morning, 2 Tablets in the evening    METOCLOPRAMIDE HCL (REGLAN) 5 MG TABLET    Take 5 mg by mouth 3 (three) times daily as needed.    NITROGLYCERIN (NITROSTAT) 0.4 MG SL TABLET    DISSOLVE 1 TABLET UNDER THE TONGUE EVERY 5 MINUTES AS  NEEDED FOR CHEST PAIN. MAX  OF 3 TABLETS IN 15 MINUTES. CALL 911 IF PAIN PERSISTS.    PRAVASTATIN (PRAVACHOL) 20 MG TABLET    Take 1 tablet (20 mg total) by mouth once daily.    RIVASTIGMINE TARTRATE (EXELON) 1.5 MG CAPSULE    TAKE 1 CAPSULE BY MOUTH  TWICE DAILY    SPIRONOLACTONE (ALDACTONE) 25 MG TABLET    Take 25 mg by mouth.    VIT C/VIT E AC/LUT/COPPER/ZINC (PRESERVISION LUTEIN ORAL)    Take by mouth.    WARFARIN (COUMADIN) 5 MG TABLET    Take 2 tabs by mouth on Tuesday,Thursday, Saturday and Sundays then 1.5 on all other days.   Discontinued Medications    FLUAD QUAD 2021-22,65Y UP,,PF, 60 MCG (15 MCG X 4)/0.5 ML SYRG        RIVASTIGMINE TARTRATE (EXELON) 1.5 MG CAPSULE    Take 1 capsule by mouth.    SPIRONOLACTONE (ALDACTONE) 25 MG TABLET    Take 12.5 mg " by mouth.         CARE TEAM:  Patient Care Team:  Kasie Edmondson MD as PCP - General (Internal Medicine)  Jac Rodriguez OD as Consulting Provider (Optometry)  Trever Arevalo MD as Consulting Physician (Ophthalmology)  Philippe Aquino MD as Consulting Physician (Endocrinology)  Mac Valderrama MD as Consulting Physician (Cardiology)  Marifer Romero DPM as Consulting Physician (Podiatry)  Remedios Madison LPN as Licensed Practical Nurse           SCREENING HISTORY:  Health Maintenance       Date Due Completion Date    Colonoscopy 10/04/2020 10/4/2017    Override on 8/6/2007: Done    Hemoglobin A1c 07/01/2022 4/1/2022    Override on 12/9/2016: Done (A1c - 7.5)    Override on 6/6/2016: Done (7.0)    Override on 1/27/2015: Done (7.9%)    Eye Exam 11/11/2022 11/11/2021    Override on 2/3/2017: Done (Dr. rAevalo)    Override on 1/22/2016: Done (Jennifer - mild bilateral diabetic retinopathy; severe bilateral dry macular degeneration)    Override on 5/8/2015: Done    Override on 10/10/2014: Done    Diabetes Urine Screening 12/01/2022 12/1/2021    Lipid Panel 12/01/2022 12/1/2021    Override on 6/6/2016: Done (total 104, HDL 32, LDL 57, TG 73)    Override on 1/27/2015: Done (HDL=36, TG=38, LDL=57)    Foot Exam 02/01/2023 2/1/2022    Override on 1/30/2020: Done    Override on 1/14/2019: Done    Override on 6/20/2018: Done    Override on 12/4/2017: Done    Override on 9/13/2017: Done    Override on 7/12/2017: Done    Override on 5/11/2017: Done    TETANUS VACCINE 05/23/2023 5/23/2013            REVIEW OF SYSTEMS:   The patient reports: poor dietary habits - : he eats mostly sugary and starchy foods and has irregular eating habits.  The patient reports : that they do not exercise.  Review of Systems   Constitutional: Positive for fatigue (- chronic). Negative for chills and fever.   Eyes: Positive for visual disturbance (- chronic poor vision).   Respiratory: Negative for shortness of breath.   "  Cardiovascular: Negative for chest pain.   Gastrointestinal: Negative for abdominal pain.   Genitourinary: Negative for dysuria.   Musculoskeletal: Positive for arthralgias.   Neurological: Positive for headaches.   Hematological: Bruises/bleeds easily (- on blood thinners).   Psychiatric/Behavioral:        Mild stable cognitive decline followed by neurology.     ROS : patient denies: difficulty initiating urination          PHYSICAL EXAM:  Vitals:    04/04/22 1101   BP: (!) 158/70   Pulse: (!) 48   Temp: 97.5 °F (36.4 °C)     Weight: 78.3 kg (172 lb 9.9 oz)   Height: 5' 8" (172.7 cm)   Body mass index is 26.25 kg/m².    General appearance - alert, well appearing, and in no distress, overweight  Psychiatric - alert, oriented to person, place, and time, normal behavior, speech, dress, motor activity, mildly depressed mood (baseline), fair to good insight  Eyes - not examined  Mouth - not examined; patient wearing mask due to Covid 19 pandemic  Chest - clear to auscultation, no wheezes, rales or rhonchi, symmetric air entry  Heart - normal rate and regular rhythm, no gallops noted  Neurological - alert, normal speech, no focal findings, cranial nerves II through XII intact  Musculoskeletal - patient noted to have Moderate osteoarthritic changes to both knee joints. No joint effusions noted., no muscular tenderness noted; he ambulated with a cane  Extremities - no pedal edema noted  Skin - normal coloration, no suspicious skin lesions, senile purpura (severe) noted on upper extremities      Labs:  Lab Results   Component Value Date    HGBA1C 8.3 (H) 04/01/2022    HGBA1C 8.4 (H) 12/01/2021    HGBA1C 8.2 (H) 06/01/2021      Lab Results   Component Value Date    CHOL 98 (L) 10/22/2013     Lab Results   Component Value Date    LDLCALC 83 12/01/2021    LDLCALC 66 06/01/2021    LDLCALC 55 02/19/2021           ASSESSMENT AND PLAN:  1. Routine medical exam  Counseled on age appropriate medical preventative services " including age appropriate cancer screenings, age appropriate eye and dental exams, over all nutritional health, need for a consistent exercise regimen, and an over all push towards maintaining a vigorous and active lifestyle.  Counseled on age appropriate vaccines and discussed upcoming health care needs based on age/gender. Discussed good sleep hygiene and stress management.    2. Type 2 diabetes, uncontrolled, with peripheral circulatory disorder/3. Type 2 diabetes, uncontrolled, with neuropathy/4. Type 2 diabetes, uncontrolled, with retinopathy  Diabetes is not controlled at this time (due to hyperglycemia) for age and comorbid conditions.   We discussed diabetic diet and regular exercise.   We discussed home blood sugar monitoring, if appropriate - the patient should test three times per day with meals and as needed.   Patient is followed by endocrinology. He will do better with diabetic diet and regular physical activity.  Diabetic complications addressed: Neuropathy pain controlled. Discussed daily foot exam using a mirror. Patient denies claudication symptoms at this time.  Patient was counseled on the need for yearly eye exam to screen for/monitor diabetic retinopathy and yearly diabetic foot exam.    5. Benign hypertension with chronic kidney disease, stage III  BP not well controlled. Not sure if this is due headache. Continue current regimen. Follow up in near future with nurse BP check.    6. Coronary artery disease, unspecified vessel or lesion type, unspecified whether angina present, unspecified whether native or transplanted heart/7. Persistent atrial fibrillation/8. Chronic stable angina/9. Chronic combined systolic and diastolic heart failure  The current medical regimen is effective;  continue present plan and medications.   Followed by: Cardiology.     10. Hyperlipidemia LDL goal <100  We discussed low fat diet and regular exercise.The current medical regimen is effective;  continue present plan  and medications.     11. Atherosclerosis of abdominal aorta  Patient with Atherosclerosis of the Aorta.  Stable/asymptomatic. Currently stable on lipid lowering medication and blood pressure monitoring.    12. COPD with chronic bronchitis and emphysema/13. Chronic respiratory failure with hypoxia, on home O2 therapy/14. ILD (interstitial lung disease)  The current medical regimen is effective;  continue present plan and medications.     15. Secondary hyperparathyroidism of renal origin  Stable. Asymptomatic. Observe.    16. Obstructive sleep apnea treated with BiPAP  CPAP compliance: yes. The patient reports that they continue to benefit from regular use of their CPAP/BiPAP machine..  We discussed the potential ramifications of untreated sleep apnea, which could include daytime sleepiness, hypertension, heart disease including CHF, sudden death while sleeping and/or stroke. The patient was advised to abstain from driving should they feel sleepy or drowsy.  We discussed potential treatment options, which could include weight loss, body positioning, continuous positive airway pressure (CPAP), or referral for surgical consideration.     17. Major depressive disorder, recurrent episode, mild/18. Anxiety state, unspecified  The current medical regimen is effective;  continue present plan and medications.     19. MCI (mild cognitive impairment)  The current medical regimen is effective;  continue present plan and medications.   Followed by: Neurology.     20. Overweight (BMI 25.0-29.9)  The patient is asked to make an attempt to improve diet and exercise patterns to aid in medical management of this problem.    21. Ambulates with cane  We discussed fall precautions.    22. Senile purpura  Stable. Asymptomatic. Observe.    23. Exudative age-related macular degeneration, right eye, with active choroidal neovascularization  The current medical regimen is effective;  continue present plan and medications.   Followed by:  ophthalmology.     24. Nonintractable headache, unspecified chronicity pattern, unspecified headache type  The patient reports a headache on top of his skull.  He states it feels like he was hit with a baseball bat.  He thinks he may have fallen asleep while sitting in his chair and that he hit his head on the cabinet door of the TV stand.  He is on blood thinners.  I discussed with the patient that he needs immediate evaluation to rule out an intracranial bleed.  He adamantly declines going by ambulance.  He will have his ride to bring him right next door to our facility to the freestanding Emergency Room for further evaluation.  I told him this could not wait until his visit with Neurology later this week.  He voiced understanding.         No orders of the defined types were placed in this encounter.      Follow up in about 6 months (around 10/4/2022), or if symptoms worsen or fail to improve, for follow up chronic medical conditions.. or sooner as needed.

## 2022-04-05 ENCOUNTER — OFFICE VISIT (OUTPATIENT)
Dept: PODIATRY | Facility: CLINIC | Age: 82
End: 2022-04-05
Payer: MEDICARE

## 2022-04-05 VITALS — HEIGHT: 68 IN | WEIGHT: 176 LBS | BODY MASS INDEX: 26.67 KG/M2

## 2022-04-05 DIAGNOSIS — L60.0 INGROWN NAIL: ICD-10-CM

## 2022-04-05 DIAGNOSIS — M20.12 HALLUX ABDUCTO VALGUS, LEFT: ICD-10-CM

## 2022-04-05 DIAGNOSIS — L84 CORN OR CALLUS: ICD-10-CM

## 2022-04-05 DIAGNOSIS — M20.11 HALLUX ABDUCTO VALGUS, RIGHT: ICD-10-CM

## 2022-04-05 DIAGNOSIS — M20.42 HAMMER TOES OF BOTH FEET: ICD-10-CM

## 2022-04-05 DIAGNOSIS — M20.41 HAMMER TOES OF BOTH FEET: ICD-10-CM

## 2022-04-05 DIAGNOSIS — B35.1 ONYCHOMYCOSIS DUE TO DERMATOPHYTE: ICD-10-CM

## 2022-04-05 DIAGNOSIS — E11.49 TYPE II DIABETES MELLITUS WITH NEUROLOGICAL MANIFESTATIONS: ICD-10-CM

## 2022-04-05 PROCEDURE — 11056 PARNG/CUTG B9 HYPRKR LES 2-4: CPT | Mod: Q9,S$GLB,, | Performed by: PODIATRIST

## 2022-04-05 PROCEDURE — 3288F PR FALLS RISK ASSESSMENT DOCUMENTED: ICD-10-PCS | Mod: CPTII,S$GLB,, | Performed by: PODIATRIST

## 2022-04-05 PROCEDURE — 1101F PT FALLS ASSESS-DOCD LE1/YR: CPT | Mod: CPTII,S$GLB,, | Performed by: PODIATRIST

## 2022-04-05 PROCEDURE — 1126F AMNT PAIN NOTED NONE PRSNT: CPT | Mod: CPTII,S$GLB,, | Performed by: PODIATRIST

## 2022-04-05 PROCEDURE — 99499 UNLISTED E&M SERVICE: CPT | Mod: S$GLB,,, | Performed by: PODIATRIST

## 2022-04-05 PROCEDURE — 1160F RVW MEDS BY RX/DR IN RCRD: CPT | Mod: CPTII,S$GLB,, | Performed by: PODIATRIST

## 2022-04-05 PROCEDURE — 1126F PR PAIN SEVERITY QUANTIFIED, NO PAIN PRESENT: ICD-10-PCS | Mod: CPTII,S$GLB,, | Performed by: PODIATRIST

## 2022-04-05 PROCEDURE — 99999 PR PBB SHADOW E&M-EST. PATIENT-LVL IV: ICD-10-PCS | Mod: PBBFAC,,, | Performed by: PODIATRIST

## 2022-04-05 PROCEDURE — 3288F FALL RISK ASSESSMENT DOCD: CPT | Mod: CPTII,S$GLB,, | Performed by: PODIATRIST

## 2022-04-05 PROCEDURE — 11721 PR DEBRIDEMENT OF NAILS, 6 OR MORE: ICD-10-PCS | Mod: 59,Q9,S$GLB, | Performed by: PODIATRIST

## 2022-04-05 PROCEDURE — 99999 PR PBB SHADOW E&M-EST. PATIENT-LVL IV: CPT | Mod: PBBFAC,,, | Performed by: PODIATRIST

## 2022-04-05 PROCEDURE — 99499 NO LOS: ICD-10-PCS | Mod: S$GLB,,, | Performed by: PODIATRIST

## 2022-04-05 PROCEDURE — 1159F MED LIST DOCD IN RCRD: CPT | Mod: CPTII,S$GLB,, | Performed by: PODIATRIST

## 2022-04-05 PROCEDURE — 1157F PR ADVANCE CARE PLAN OR EQUIV PRESENT IN MEDICAL RECORD: ICD-10-PCS | Mod: CPTII,S$GLB,, | Performed by: PODIATRIST

## 2022-04-05 PROCEDURE — 1157F ADVNC CARE PLAN IN RCRD: CPT | Mod: CPTII,S$GLB,, | Performed by: PODIATRIST

## 2022-04-05 PROCEDURE — 1101F PR PT FALLS ASSESS DOC 0-1 FALLS W/OUT INJ PAST YR: ICD-10-PCS | Mod: CPTII,S$GLB,, | Performed by: PODIATRIST

## 2022-04-05 PROCEDURE — 1160F PR REVIEW ALL MEDS BY PRESCRIBER/CLIN PHARMACIST DOCUMENTED: ICD-10-PCS | Mod: CPTII,S$GLB,, | Performed by: PODIATRIST

## 2022-04-05 PROCEDURE — 11721 DEBRIDE NAIL 6 OR MORE: CPT | Mod: 59,Q9,S$GLB, | Performed by: PODIATRIST

## 2022-04-05 PROCEDURE — 11056 PR TRIM BENIGN HYPERKERATOTIC SKIN LESION,2-4: ICD-10-PCS | Mod: Q9,S$GLB,, | Performed by: PODIATRIST

## 2022-04-05 PROCEDURE — 1159F PR MEDICATION LIST DOCUMENTED IN MEDICAL RECORD: ICD-10-PCS | Mod: CPTII,S$GLB,, | Performed by: PODIATRIST

## 2022-04-05 NOTE — PROGRESS NOTES
Subjective:      Patient ID: Percy Ramirez is a 81 y.o. male.    Chief Complaint: Diabetes Mellitus (PCP  04/04/2022), Foot Problem, and Follow-up    Percy is a 81 y.o. male who presents to the clinic for evaluation and treatment of high risk feet. Percy has a past medical history of Allergy, Arthritis, CAD, multiple vessel, Cataract, Depression, History of colon polyps, Hyperlipidemia, Hypertension, Macular degeneration, S/P CABG x 2, and Type 2 diabetes mellitus with circulatory disorder. The patient's chief complaint is elongated, thickened toenails aggravated by increased weight bearing, shoe gear, pressure. This patient has documented high risk feet requiring routine maintenance secondary to diabetes mellitis and those secondary complications of diabetes, as mentioned..    PCP: Kasie Edmondson MD    Date Last Seen by PCP:   Chief Complaint   Patient presents with    Diabetes Mellitus     PCP  04/04/2022    Foot Problem    Follow-up     Current shoe gear:  rx shoes, worn (new shoes at home)    Hemoglobin A1C   Date Value Ref Range Status   04/01/2022 8.3 (H) 4.0 - 5.6 % Final     Comment:     ADA Screening Guidelines:  5.7-6.4%  Consistent with prediabetes  >or=6.5%  Consistent with diabetes    High levels of fetal hemoglobin interfere with the HbA1C  assay. Heterozygous hemoglobin variants (HbS, HgC, etc)do  not significantly interfere with this assay.   However, presence of multiple variants may affect accuracy.     12/01/2021 8.4 (H) 4.0 - 5.6 % Final     Comment:     [facility]   06/01/2021 8.2 (H) 4.0 - 5.6 % Final     Comment:     [facility]   02/19/2021 9.4 (H) 4.0 - 5.6 % Final     Comment:     [facility]     Patient Active Problem List   Diagnosis    Major depressive disorder, recurrent episode, mild    Benign hypertension with chronic kidney disease, stage III    Type 2 diabetes, uncontrolled, with retinopathy    Coronary artery disease    S/P CABG x 2    Macular  "degeneration    Obstructive sleep apnea treated with BiPAP    Anxiety state, unspecified    Insulin long-term use    Primary osteoarthritis of both knees    Chronic low back pain    DJD (degenerative joint disease), lumbar    Type 2 diabetes, uncontrolled, with neuropathy    Bilateral carotid artery disease    Hyperlipidemia LDL goal <100    Type 2 diabetes, uncontrolled, with peripheral circulatory disorder    COPD with chronic bronchitis and emphysema    Atherosclerosis of abdominal aorta    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    Overweight (BMI 25.0-29.9)    Persistent atrial fibrillation    Chronic stable angina    Senile purpura    Osteoarthritis of carpometacarpal (CMC) joint of left thumb    MCI (mild cognitive impairment)    Chronic vertigo    Pulmonary nodule    Dyspnea on exertion    Chronic respiratory failure with hypoxia, on home O2 therapy    Chronic combined systolic and diastolic heart failure    Ambulates with cane    ILD (interstitial lung disease)    History of cardioversion    Erectile dysfunction    Secondary hyperparathyroidism of renal origin    Noncompliance with medication regimen    Exudative age-related macular degeneration, right eye, with active choroidal neovascularization    History of stroke     Current Outpatient Medications on File Prior to Visit   Medication Sig Dispense Refill    acetaminophen (TYLENOL) 325 MG tablet Take 2 tablets (650 mg total) by mouth every 6 (six) hours as needed. 13 tablet 0    albuterol (PROVENTIL/VENTOLIN HFA) 90 mcg/actuation inhaler Rescue 18 g 0    atorvastatin (LIPITOR) 40 MG tablet Take 40 mg by mouth once daily.      BD INSULIN PEN NEEDLE UF SHORT 31 gauge x 5/16" Ndle       BD INSULIN SYRINGE ULTRA-FINE 1/2 mL 31 gauge x 15/64" Syrg       blood sugar diagnostic Strp To check BG 3 times daily, to use with insurance preferred meter 200 strip 11    blood-glucose meter kit To " "check BG 3 times daily, to use with insurance preferred meter 1 each 0    carvedilol (COREG) 25 MG tablet Take 1 tablet (25 mg total) by mouth 2 (two) times daily. 180 tablet 0    cinnamon bark 500 mg capsule Take 1,000 mg by mouth once daily.        clopidogreL (PLAVIX) 75 mg tablet Take 75 mg by mouth.      diclofenac sodium (VOLTAREN) 1 % Gel Apply 2 g topically 2 (two) times daily. 100 g 0    diclofenac sodium (VOLTAREN) 1 % Gel Apply 2 g topically 3 (three) times daily. 50 g 0    DULoxetine (CYMBALTA) 60 MG capsule Take 1 capsule (60 mg total) by mouth once daily. 90 capsule 3    ENTRESTO  mg per tablet Take 1 tablet by mouth 2 (two) times daily.      fluticasone propionate (FLONASE) 50 mcg/actuation nasal spray 1 spray (50 mcg total) by Each Nostril route 2 (two) times daily. 18.2 mL 3    furosemide (LASIX) 40 MG tablet Take 40 mg by mouth once daily.      gabapentin (NEURONTIN) 300 MG capsule Take 4 capsules (1,200 mg total) by mouth 2 (two) times daily. (Patient taking differently: Take 900 mg by mouth 2 (two) times daily.) 540 capsule 1    glimepiride (AMARYL) 4 MG tablet Take 4 mg by mouth daily with breakfast.       HYDROcodone-acetaminophen (NORCO) 5-325 mg per tablet Take 1 tablet by mouth every 4 (four) hours as needed for Pain. Causes drowsiness. Do not drive or operate machinery with this medication. Do not drink alcohol with this medication. Causes constipation. Take with stool softener. 10 tablet 0    insulin NPH-insulin regular, 70/30, (NOVOLIN 70/30) 100 unit/mL (70-30) injection Inject into the skin. Inject 10 units at breakfast and inject 10 units at night      insulin syringe-needle U-100 0.3 mL 31 gauge x 15/64" Syrg USE TO INJECT INSULIN THREE TIMES DAILY      insulin syringe-needle U-100 1/2 mL 31 x 5/16" Syrg       isosorbide mononitrate (IMDUR) 30 MG 24 hr tablet Take 30 mg by mouth once daily.      lancets Misc To check BG 3 times daily, to use with insurance " preferred meter 200 each 11    LIDOcaine (LIDODERM) 5 % Place 1 patch onto the skin once daily. Remove & Discard patch within 12 hours or as directed by MD 5 patch 1    metformin (GLUCOPHAGE) 500 MG tablet Take 1,000 mg by mouth 2 (two) times daily with meals. 2 Tablets in the morning, 2 Tablets in the evening      metoclopramide HCl (REGLAN) 5 MG tablet Take 5 mg by mouth 3 (three) times daily as needed.      nitroGLYCERIN (NITROSTAT) 0.4 MG SL tablet DISSOLVE 1 TABLET UNDER THE TONGUE EVERY 5 MINUTES AS  NEEDED FOR CHEST PAIN. MAX  OF 3 TABLETS IN 15 MINUTES. CALL 911 IF PAIN PERSISTS.      pravastatin (PRAVACHOL) 20 MG tablet Take 1 tablet (20 mg total) by mouth once daily. 90 tablet 1    rivastigmine tartrate (EXELON) 1.5 MG capsule TAKE 1 CAPSULE BY MOUTH  TWICE DAILY 180 capsule 3    spironolactone (ALDACTONE) 25 MG tablet Take 25 mg by mouth.      VIT C/VIT E AC/LUT/COPPER/ZINC (PRESERVISION LUTEIN ORAL) Take by mouth.      warfarin (COUMADIN) 5 MG tablet Take 2 tabs by mouth on Tuesday,Thursday, Saturday and Sundays then 1.5 on all other days.      [DISCONTINUED] diazepam (VALIUM) 2 MG tablet Take 2 mg by mouth continuous prn.       No current facility-administered medications on file prior to visit.     Review of patient's allergies indicates:   Allergen Reactions    Codeine Nausea Only     weak     Past Surgical History:   Procedure Laterality Date    broken elbow      left    COLONOSCOPY N/A 10/4/2017    Procedure: COLONOSCOPY;  Surgeon: Gabriel Leung MD;  Location: [facility];  Service: Endoscopy;  Laterality: N/A;    EYE SURGERY      cataract    heart bypass      2004    HERNIA REPAIR      right    ROTATOR CUFF REPAIR      right  2004     Family History   Problem Relation Age of Onset    Arthritis Mother     Diabetes Mother     Stroke Mother     Heart attack Father     Cancer Brother     Throat cancer Brother     Diabetes Maternal Aunt     Diabetes Maternal Uncle     Heart  "disease Maternal Uncle     Diabetes Paternal Aunt     Heart disease Paternal Aunt     Heart disease Paternal Uncle     Heart disease Maternal Grandmother     Cancer Maternal Grandfather      Social History     Socioeconomic History    Marital status:     Number of children: 4    Years of education: GED   Occupational History    Occupation: retired tug    Tobacco Use    Smoking status: Former Smoker     Packs/day: 3.00     Years: 45.00     Pack years: 135.00     Types: Cigarettes     Quit date: 2004     Years since quittin.9    Smokeless tobacco: Never Used   Substance and Sexual Activity    Alcohol use: Yes     Comment: was heavy drinker 35 years ago, rare use now    Drug use: No    Sexual activity: Yes     Partners: Female     Review of Systems   Constitution: Negative for chills, fever and weakness.   Cardiovascular: Negative for claudication and leg swelling.   Respiratory: Positive for cough. Negative for shortness of breath.    Skin: Positive for dry skin and nail changes. Negative for itching and rash.   Musculoskeletal: Positive for arthritis, back pain and joint pain. Negative for falls, joint swelling and muscle weakness.   Gastrointestinal: Negative for diarrhea, nausea and vomiting.   Neurological: Positive for numbness and paresthesias. Negative for tremors.   Psychiatric/Behavioral: Negative for altered mental status and hallucinations.           Objective:       Vitals:    22 0900   Weight: 79.8 kg (176 lb)   Height: 5' 8" (1.727 m)   PainSc: 0-No pain       Physical Exam   Constitutional:   General: Pt. is well-developed, well-nourished, appears stated age, in no acute distress, alert and oriented x 3. No evidence of depression, anxiety, or agitation. Calm, cooperative, and communicative. Appropriate interactions and affect.       Cardiovascular:   Pulses:       Dorsalis pedis pulses are 2+ on the right side, and 2+ on the left side.        Posterior " tibial pulses are 1+ on the right side, and 1+ on the left side.   There is decreased digital hair.   Musculoskeletal:        Right foot: There is normal range of motion.        Left foot:  There is normal range of motion.   Muscle strength is 5/5 in all groups bilaterally.    Decreased stride, station of gait.  apropulsive toe off.  Increased angle and base of gait.    Patient has hammertoes of digits 2-5 bilateral partially reducible without symptom today.    Visible and palpable bunion without pain at dorsomedial 1st metatarsal head right and left.  Hallux abducted right and left partially reducible, tracks laterally without being track bound.  No ecchymosis, erythema, edema, or cardinal signs infection or signs of trauma same foot.     Neurological: A sensory deficit is present.   [place] 5.07 monofilamant testing is diminished Farhad feet. Sharp/dull sensation diminished Bilaterally   Skin: Skin is warm, dry. No abrasion, no ecchymosis, no rash noted. He is not diaphoretic. No cyanosis. No pallor. Nails show no clubbing.   Medial and lateral hallux nail margin of right foot with ingrown nail plate and thick HPK. Surrounding erythema and minimal edema is noted there is no granuloma formation noted. no malodor     Toenails 1-5 bilaterally are elongated by 2-3 mm, thickened by 2-3 mm, discolored/yellowed, dystrophic, brittle with subungual debris.    Focal hyperkeratotic lesion consisting entirely of hyperkeratotic tissue without open skin, drainage, pus, fluctuance, malodor, or signs of infection: medial IPJ of hallux farhad    Interdigital Spaces clean, dry and without evidence of break in skin integrity   Psychiatric: He has a normal mood and affect. His speech is normal.   Nursing note and vitals reviewed.        Assessment:       Encounter Diagnoses   Name Primary?    Type 2 diabetes, uncontrolled, with peripheral circulatory disorder Yes    Type II diabetes mellitus with neurological manifestations      Hammer toes of both feet     Hallux abducto valgus, left     Hallux abducto valgus, right     Ingrown nail     Onychomycosis due to dermatophyte     Corn or callus          Plan:       Percy was seen today for diabetes mellitus, foot problem and follow-up.    Diagnoses and all orders for this visit:    Type 2 diabetes, uncontrolled, with peripheral circulatory disorder    Type II diabetes mellitus with neurological manifestations    Hammer toes of both feet    Hallux abducto valgus, left    Hallux abducto valgus, right    Ingrown nail    Onychomycosis due to dermatophyte    Corn or callus      I counseled the patient on his conditions, their implications and medical management.      - Shoe inspection. Diabetic Foot Education. Patient reminded of the importance of good nutrition and blood sugar control to help prevent podiatric complications of diabetes. Patient instructed on proper foot hygeine. We discussed wearing proper shoe gear, daily foot inspections, never walking without protective shoe gear, never putting sharp instruments to feet    - With patient's permission, nails were aggressively reduced and debrided x 10 to their soft tissue attachment mechanically and with electric , removing all offending nail and debris. Patient relates relief following the procedure. Utilizing sterile toenail clippers I aggressively trimmed  the offending right hallux  nail border approximately 3 mm from its edge and carried the nail plate incision down at an angle in order to wedge out the offending cryptotic portion of the nail plate. The offending border was then removed in toto. The remaining nail was grinded down with an electric  down to nail bed.  Silver nitrate to any abrasions. Patient tolerated the procedure well and related significant relief.    - After cleansing the  area w/ alcohol prep pad the above mentioned hyperkeratosis was trimmed utilizing No 15 scapel, to a smooth base with out incident.  Patient tolerated this  well and reported comfort to the area of medial hallux IPJ concha    - He will continue to monitor the areas daily, inspect his feet, wear protective shoe gear when ambulatory, moisturizer to maintain skin integrity and follow in this office in approximately 2-3 months, sooner PRN

## 2022-04-07 ENCOUNTER — OFFICE VISIT (OUTPATIENT)
Dept: NEUROLOGY | Facility: CLINIC | Age: 82
End: 2022-04-07
Payer: MEDICARE

## 2022-04-07 VITALS
BODY MASS INDEX: 26.8 KG/M2 | SYSTOLIC BLOOD PRESSURE: 124 MMHG | HEART RATE: 57 BPM | DIASTOLIC BLOOD PRESSURE: 62 MMHG | HEIGHT: 68 IN | WEIGHT: 176.81 LBS

## 2022-04-07 DIAGNOSIS — R26.89 BALANCE DISORDER: ICD-10-CM

## 2022-04-07 DIAGNOSIS — G44.221 CHRONIC TENSION-TYPE HEADACHE, INTRACTABLE: Primary | ICD-10-CM

## 2022-04-07 DIAGNOSIS — R42 CHRONIC VERTIGO: ICD-10-CM

## 2022-04-07 PROCEDURE — 1157F ADVNC CARE PLAN IN RCRD: CPT | Mod: CPTII,S$GLB,, | Performed by: NEUROLOGICAL SURGERY

## 2022-04-07 PROCEDURE — 1101F PT FALLS ASSESS-DOCD LE1/YR: CPT | Mod: CPTII,S$GLB,, | Performed by: NEUROLOGICAL SURGERY

## 2022-04-07 PROCEDURE — 1125F PR PAIN SEVERITY QUANTIFIED, PAIN PRESENT: ICD-10-PCS | Mod: CPTII,S$GLB,, | Performed by: NEUROLOGICAL SURGERY

## 2022-04-07 PROCEDURE — 1101F PR PT FALLS ASSESS DOC 0-1 FALLS W/OUT INJ PAST YR: ICD-10-PCS | Mod: CPTII,S$GLB,, | Performed by: NEUROLOGICAL SURGERY

## 2022-04-07 PROCEDURE — 3078F PR MOST RECENT DIASTOLIC BLOOD PRESSURE < 80 MM HG: ICD-10-PCS | Mod: CPTII,S$GLB,, | Performed by: NEUROLOGICAL SURGERY

## 2022-04-07 PROCEDURE — 3078F DIAST BP <80 MM HG: CPT | Mod: CPTII,S$GLB,, | Performed by: NEUROLOGICAL SURGERY

## 2022-04-07 PROCEDURE — 1125F AMNT PAIN NOTED PAIN PRSNT: CPT | Mod: CPTII,S$GLB,, | Performed by: NEUROLOGICAL SURGERY

## 2022-04-07 PROCEDURE — 99214 PR OFFICE/OUTPT VISIT, EST, LEVL IV, 30-39 MIN: ICD-10-PCS | Mod: S$GLB,,, | Performed by: NEUROLOGICAL SURGERY

## 2022-04-07 PROCEDURE — 3288F FALL RISK ASSESSMENT DOCD: CPT | Mod: CPTII,S$GLB,, | Performed by: NEUROLOGICAL SURGERY

## 2022-04-07 PROCEDURE — 3074F SYST BP LT 130 MM HG: CPT | Mod: CPTII,S$GLB,, | Performed by: NEUROLOGICAL SURGERY

## 2022-04-07 PROCEDURE — 3074F PR MOST RECENT SYSTOLIC BLOOD PRESSURE < 130 MM HG: ICD-10-PCS | Mod: CPTII,S$GLB,, | Performed by: NEUROLOGICAL SURGERY

## 2022-04-07 PROCEDURE — 1159F PR MEDICATION LIST DOCUMENTED IN MEDICAL RECORD: ICD-10-PCS | Mod: CPTII,S$GLB,, | Performed by: NEUROLOGICAL SURGERY

## 2022-04-07 PROCEDURE — 1157F PR ADVANCE CARE PLAN OR EQUIV PRESENT IN MEDICAL RECORD: ICD-10-PCS | Mod: CPTII,S$GLB,, | Performed by: NEUROLOGICAL SURGERY

## 2022-04-07 PROCEDURE — 99214 OFFICE O/P EST MOD 30 MIN: CPT | Mod: S$GLB,,, | Performed by: NEUROLOGICAL SURGERY

## 2022-04-07 PROCEDURE — 99999 PR PBB SHADOW E&M-EST. PATIENT-LVL V: ICD-10-PCS | Mod: PBBFAC,,, | Performed by: NEUROLOGICAL SURGERY

## 2022-04-07 PROCEDURE — 1159F MED LIST DOCD IN RCRD: CPT | Mod: CPTII,S$GLB,, | Performed by: NEUROLOGICAL SURGERY

## 2022-04-07 PROCEDURE — 3288F PR FALLS RISK ASSESSMENT DOCUMENTED: ICD-10-PCS | Mod: CPTII,S$GLB,, | Performed by: NEUROLOGICAL SURGERY

## 2022-04-07 PROCEDURE — 99999 PR PBB SHADOW E&M-EST. PATIENT-LVL V: CPT | Mod: PBBFAC,,, | Performed by: NEUROLOGICAL SURGERY

## 2022-04-07 RX ORDER — DIAZEPAM 2 MG/1
2 TABLET ORAL EVERY 12 HOURS PRN
Qty: 20 TABLET | Refills: 0 | Status: SHIPPED | OUTPATIENT
Start: 2022-04-07 | End: 2023-03-17

## 2022-04-07 RX ORDER — TIZANIDINE 4 MG/1
4 TABLET ORAL EVERY 6 HOURS PRN
Qty: 30 TABLET | Refills: 3 | Status: SHIPPED | OUTPATIENT
Start: 2022-04-07 | End: 2022-04-17

## 2022-04-07 NOTE — PROGRESS NOTES
Chief Complaint   Patient presents with    Memory Loss        Percy Ramirez is a 81 y.o. male with a history of multiple medical diagnoses as listed below that presents for evaluation of memory loss.  He says he has been having short-term memory loss and has been having more difficulty with functioning around the home.  He says that he has been making some errors on occasion with his medications a seems to be more forgetful when it comes to functioning around the home.  He does not feel that there has been any impact on him being able to care for himself .  He says that one of his biggest problems has been remembering names, but other smaller things have been bothersome as well such as forgetting what he felt into a room to get or forgetting a task that he wanted to do around the home.    Interval History  06/18/2018  He has been doing about the same overall since he was last seen in clinic.  He has been bothered by a scratch on the left hand that he cannot get to stop bleeding.  He said that he noticed a scratch on Saturday and this by bending area he has continued to have some oozing of blood there.  He has not had any other problems in the time since he was last seen.    02/18/2019  Patient presents for routine follow-up.  He says that his memory continues to be a source of concern for him.  He has not had any significant change in his memory status. The patient presents alone to clinic today.  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  He says that since he is retired due to his age, ailing health, and failing vision he has been sitting around the home watching television most of the day.  He admits that he does not engage in much physical or mental  activity.  He has been taking Aricept as directed without any complaints of any side effects.    02/10/2020  Despite the concerns expressed at his last visit he has been most bothered by burning, itching, and tingling sensations along the foot, pain is most prominent from the toes to the level of the ankle on both feet.  He says that he cannot say exactly when the symptoms began but they seem to be more intense and more intrusive on his life than it had been in the past.  He has been taking gabapentin as directed but feels that the medication offers little to no benefit in pain control.    04/08/2022  He continues to have difficulty with his memory but his main concern has been dizziness and pain in his head.  He says he has a sharp pain and an aching pain that tends to occur across the middle of his head.  The symptoms are more present toward the later parts of the day and seemed to be unresponsive to any medication he has used thus far.  Pain quality is usually a constant aching intensity that builds over the course of the day.  He has not had any weakness.  Occasionally pain will radiate to the back of the head but he has not had any pain in his neck or his shoulders.  He denies any light or sound sensitivity.  Pain is usually moderate in intensity lasting for several hours at a time.  He also continues to have episodes of vertigo which has been a chronic problem.  He has tried meclizine and other antiemetic medication but has not found any significant improvement.  Patient has used Valium in the past which was effective whenever the bowels were uncontrollable.    PAST MEDICAL HISTORY:  Past Medical History:   Diagnosis Date    Allergy     Arthritis     CAD, multiple vessel     DR Melissa    Cataract     Depression     History of colon polyps     Hyperlipidemia     Hypertension     Macular degeneration     Dr Razo for injection, Jennifer for eye    S/P CABG x 2     Type 2 diabetes mellitus with  circulatory disorder     Dr. Aquino       PAST SURGICAL HISTORY:  Past Surgical History:   Procedure Laterality Date    broken elbow      left    COLONOSCOPY N/A 10/4/2017    Procedure: COLONOSCOPY;  Surgeon: Gabriel Leung MD;  Location: [facility];  Service: Endoscopy;  Laterality: N/A;    EYE SURGERY      cataract    heart bypass          HERNIA REPAIR      right    ROTATOR CUFF REPAIR      right  2004       SOCIAL HISTORY:  Social History     Socioeconomic History    Marital status:     Number of children: 4    Years of education: GED   Occupational History    Occupation: retired tug    Tobacco Use    Smoking status: Former Smoker     Packs/day: 3.00     Years: 45.00     Pack years: 135.00     Types: Cigarettes     Quit date: 2004     Years since quittin.9    Smokeless tobacco: Never Used   Substance and Sexual Activity    Alcohol use: Yes     Comment: was heavy drinker 35 years ago, rare use now    Drug use: No    Sexual activity: Yes     Partners: Female       FAMILY HISTORY:  Family History   Problem Relation Age of Onset    Arthritis Mother     Diabetes Mother     Stroke Mother     Heart attack Father     Cancer Brother     Throat cancer Brother     Diabetes Maternal Aunt     Diabetes Maternal Uncle     Heart disease Maternal Uncle     Diabetes Paternal Aunt     Heart disease Paternal Aunt     Heart disease Paternal Uncle     Heart disease Maternal Grandmother     Cancer Maternal Grandfather        ALLERGIES AND MEDICATIONS: updated and reviewed.  Review of patient's allergies indicates:   Allergen Reactions    Codeine Nausea Only     weak    Ranexa [ranolazine]      Current Outpatient Medications   Medication Sig Dispense Refill    acetaminophen (TYLENOL) 325 MG tablet Take 2 tablets (650 mg total) by mouth every 6 (six) hours as needed. 13 tablet 0    albuterol (PROVENTIL/VENTOLIN HFA) 90 mcg/actuation inhaler Rescue 18 g 0    atorvastatin  "(LIPITOR) 40 MG tablet Take 40 mg by mouth once daily.      BD INSULIN PEN NEEDLE UF SHORT 31 gauge x 5/16" Ndle       BD INSULIN SYRINGE ULTRA-FINE 1/2 mL 31 gauge x 15/64" Syrg       blood sugar diagnostic Strp To check BG 3 times daily, to use with insurance preferred meter 200 strip 11    blood-glucose meter kit To check BG 3 times daily, to use with insurance preferred meter 1 each 0    carvedilol (COREG) 25 MG tablet Take 1 tablet (25 mg total) by mouth 2 (two) times daily. 180 tablet 0    cinnamon bark 500 mg capsule Take 1,000 mg by mouth once daily.        clopidogreL (PLAVIX) 75 mg tablet Take 75 mg by mouth.      diazePAM (VALIUM) 2 MG tablet Take 1 tablet (2 mg total) by mouth every 12 (twelve) hours as needed for Anxiety (Dizziness). 20 tablet 0    diclofenac sodium (VOLTAREN) 1 % Gel Apply 2 g topically 2 (two) times daily. 100 g 0    diclofenac sodium (VOLTAREN) 1 % Gel Apply 2 g topically 3 (three) times daily. 50 g 0    DULoxetine (CYMBALTA) 60 MG capsule Take 1 capsule (60 mg total) by mouth once daily. 90 capsule 3    ENTRESTO  mg per tablet Take 1 tablet by mouth 2 (two) times daily.      fluticasone propionate (FLONASE) 50 mcg/actuation nasal spray 1 spray (50 mcg total) by Each Nostril route 2 (two) times daily. 18.2 mL 3    furosemide (LASIX) 40 MG tablet Take 40 mg by mouth once daily.      gabapentin (NEURONTIN) 300 MG capsule Take 4 capsules (1,200 mg total) by mouth 2 (two) times daily. (Patient taking differently: Take 900 mg by mouth 2 (two) times daily.) 540 capsule 1    glimepiride (AMARYL) 4 MG tablet Take 4 mg by mouth daily with breakfast.       HYDROcodone-acetaminophen (NORCO) 5-325 mg per tablet Take 1 tablet by mouth every 4 (four) hours as needed for Pain. Causes drowsiness. Do not drive or operate machinery with this medication. Do not drink alcohol with this medication. Causes constipation. Take with stool softener. 10 tablet 0    insulin NPH-insulin " "regular, 70/30, (NOVOLIN 70/30) 100 unit/mL (70-30) injection Inject into the skin. Inject 10 units at breakfast and inject 10 units at night      insulin syringe-needle U-100 0.3 mL 31 gauge x 15/64" Syrg USE TO INJECT INSULIN THREE TIMES DAILY      insulin syringe-needle U-100 1/2 mL 31 x 5/16" Syrg       isosorbide mononitrate (IMDUR) 30 MG 24 hr tablet Take 30 mg by mouth once daily.      lancets Mis To check BG 3 times daily, to use with insurance preferred meter 200 each 11    LIDOcaine (LIDODERM) 5 % Place 1 patch onto the skin once daily. Remove & Discard patch within 12 hours or as directed by MD Townsend patch 1    metformin (GLUCOPHAGE) 500 MG tablet Take 1,000 mg by mouth 2 (two) times daily with meals. 2 Tablets in the morning, 2 Tablets in the evening      metoclopramide HCl (REGLAN) 5 MG tablet Take 5 mg by mouth 3 (three) times daily as needed.      nitroGLYCERIN (NITROSTAT) 0.4 MG SL tablet DISSOLVE 1 TABLET UNDER THE TONGUE EVERY 5 MINUTES AS  NEEDED FOR CHEST PAIN. MAX  OF 3 TABLETS IN 15 MINUTES. CALL 911 IF PAIN PERSISTS.      pravastatin (PRAVACHOL) 20 MG tablet Take 1 tablet (20 mg total) by mouth once daily. 90 tablet 1    rivastigmine tartrate (EXELON) 1.5 MG capsule TAKE 1 CAPSULE BY MOUTH  TWICE DAILY 180 capsule 3    spironolactone (ALDACTONE) 25 MG tablet Take 25 mg by mouth.      tiZANidine (ZANAFLEX) 4 MG tablet Take 1 tablet (4 mg total) by mouth every 6 (six) hours as needed. 30 tablet 3    VIT C/VIT E AC/LUT/COPPER/ZINC (PRESERVISION LUTEIN ORAL) Take by mouth.      warfarin (COUMADIN) 5 MG tablet Take 2 tabs by mouth on Tuesday,Thursday, Saturday and Sundays then 1.5 on all other days.       No current facility-administered medications for this visit.       Review of Systems   Constitutional: Negative for activity change, fatigue and unexpected weight change.   HENT: Negative for trouble swallowing and voice change.    Eyes: Negative for photophobia, pain and visual " disturbance.   Respiratory: Negative for apnea and shortness of breath.    Cardiovascular: Negative for chest pain and palpitations.   Gastrointestinal: Negative for constipation, nausea and vomiting.   Genitourinary: Negative for difficulty urinating.   Musculoskeletal: Negative for arthralgias, back pain, gait problem, myalgias and neck pain.   Skin: Negative for color change and rash.   Neurological: Negative for dizziness, seizures, syncope, speech difficulty, weakness, light-headedness, numbness and headaches.   Psychiatric/Behavioral: Positive for confusion and decreased concentration. Negative for agitation and behavioral problems.       Neurologic Exam     Mental Status   Oriented to person, place, and time.   Oriented to person.   Oriented to city.   Oriented to year, month, date and day.   Registration of memory: 5/5. Recall of objects at 5 minutes: 3/5.   Attention: normal. Concentration: normal.   Speech: speech is normal   Level of consciousness: alert  Knowledge: good.   Able to name object. Unable to repeat.     Cranial Nerves     CN II   Visual fields full to confrontation.   Right visual field deficit: none  Left visual field deficit: none     CN III, IV, VI   Pupils are equal, round, and reactive to light.  Extraocular motions are normal.   Right pupil: Size: 3 mm. Shape: regular. Accommodation: intact.   Left pupil: Size: 3 mm. Shape: regular. Accommodation: intact.   CN III: no CN III palsy  CN VI: no CN VI palsy  Nystagmus: none   Diplopia: none  Ophthalmoparesis: none  Upgaze: normal  Downgaze: normal  Conjugate gaze: present    CN V   Facial sensation intact.   Right facial sensation deficit: none  Left facial sensation deficit: none    CN VII   Facial expression full, symmetric.   Right facial weakness: none  Left facial weakness: none    CN VIII   CN VIII normal.     CN IX, X   CN IX normal.   CN X normal.   Palate: symmetric    CN XI   CN XI normal.   Right sternocleidomastoid strength:  normal  Left sternocleidomastoid strength: normal  Right trapezius strength: normal  Left trapezius strength: normal    CN XII   CN XII normal.   Tongue deviation: none    Motor Exam   Muscle bulk: normal  Overall muscle tone: normal  Right arm tone: normal  Left arm tone: normal  Right leg tone: normal  Left leg tone: normal    Strength   Strength 5/5 throughout.     Sensory Exam   Right arm light touch: normal  Left arm light touch: normal  Right leg light touch: normal  Left leg light touch: normal  Right arm vibration: normal  Left arm vibration: normal  Right leg vibration: normal  Left leg vibration: normal  Right arm proprioception: normal  Left arm proprioception: normal  Right leg proprioception: normal  Left leg proprioception: normal  Right arm pinprick: normal  Left arm pinprick: normal  Right leg pinprick: normal  Left leg pinprick: normal    Gait, Coordination, and Reflexes     Gait  Gait: normal    Coordination   Romberg: negative  Finger to nose coordination: normal  Heel to shin coordination: normal  Tandem walking coordination: normal    Tremor   Resting tremor: absent    Reflexes   Right brachioradialis: 2+  Left brachioradialis: 2+  Right biceps: 2+  Left biceps: 2+  Right triceps: 2+  Left triceps: 2+  Right patellar: 2+  Left patellar: 2+  Right achilles: 2+  Left achilles: 2+  Right plantar: normal  Left plantar: normal      Physical Exam   Constitutional: He is oriented to person, place, and time. He appears well-developed and well-nourished.   HENT:   Head: Normocephalic and atraumatic.   Eyes: Pupils are equal, round, and reactive to light. EOM are normal.   Cardiovascular: Intact distal pulses.   Pulmonary/Chest: Effort normal. No respiratory distress.   Musculoskeletal: Normal range of motion.   Neurological: He is alert and oriented to person, place, and time. He has normal strength. He has a normal Finger-Nose-Finger Test, a normal Heel to Shin Test, a normal Romberg Test and a normal  "Tandem Gait Test. Gait normal.   Reflex Scores:       Tricep reflexes are 2+ on the right side and 2+ on the left side.       Bicep reflexes are 2+ on the right side and 2+ on the left side.       Brachioradialis reflexes are 2+ on the right side and 2+ on the left side.       Patellar reflexes are 2+ on the right side and 2+ on the left side.       Achilles reflexes are 2+ on the right side and 2+ on the left side.  Skin: Skin is warm and dry.   Psychiatric: He has a normal mood and affect. His speech is normal and behavior is normal.       Vitals:    04/07/22 0857   BP: 124/62   Pulse: (!) 57   Weight: 80.2 kg (176 lb 12.9 oz)   Height: 5' 8" (1.727 m)     MOCA 18/25 (visual-spatial portions of the examination were refused, as the patient has poor vision)  MRI brain personally reviewed: Chronic microvascular ischemic changes noted.    Assessment & Plan:    Problem List Items Addressed This Visit     Chronic vertigo    Overview     Discussed use of meclizine is his primary medication to address vertigo.  Valium was prescribed for about start are under control with this medication along.  Discussed that the medication can and will cause increased balance dysfunction and possible weakness given is nature.  Patient advised that if he uses the medication he should not he is about more than necessary in order to prevent or reduce his risk of falls.  Patient voiced understanding           Relevant Medications    diazePAM (VALIUM) 2 MG tablet    Chronic tension-type headache, intractable - Primary    Relevant Medications    tiZANidine (ZANAFLEX) 4 MG tablet      Other Visit Diagnoses     Balance disorder            Follow-up: Follow up in about 6 months (around 10/7/2022).          "

## 2022-04-08 PROBLEM — G44.221 CHRONIC TENSION-TYPE HEADACHE, INTRACTABLE: Status: ACTIVE | Noted: 2022-04-08

## 2022-04-14 DIAGNOSIS — J44.1 COPD EXACERBATION: ICD-10-CM

## 2022-04-14 RX ORDER — ALBUTEROL SULFATE 90 UG/1
AEROSOL, METERED RESPIRATORY (INHALATION)
Qty: 54 G | Refills: 0 | Status: SHIPPED | OUTPATIENT
Start: 2022-04-14 | End: 2023-12-19 | Stop reason: SDUPTHER

## 2022-04-14 NOTE — TELEPHONE ENCOUNTER
Refill Routing Note   Medication(s) are not appropriate for processing by [facility] for the following reason(s):      - Indication is outside of scope for [facility]  - Patient has been seen in the ED/Hospital since the last PCP visit    [facility] action(s):  Defer          Medication reconciliation completed: No     Appointments  past 12m or future 3m with PCP    Date Provider   Last Visit   4/4/2022 Kasie Edmondson MD   Next Visit   Visit date not found Kasie Edmondson MD   ED visits in past 90 days: 1        Note composed:12:25 PM 04/14/2022

## 2022-04-14 NOTE — TELEPHONE ENCOUNTER
No new care gaps identified.  Powered by [employer]. Reference number: [id number].   4/14/2022 9:47:41 AM CDT

## 2022-04-26 ENCOUNTER — TELEPHONE (OUTPATIENT)
Dept: SURGERY | Facility: CLINIC | Age: 82
End: 2022-04-26
Payer: MEDICARE

## 2022-04-26 NOTE — TELEPHONE ENCOUNTER
Spoke with Mr. Ramirez he states he would like to get an appointment with Dr. Ayala to discuss possible surgery. Inform him next available clinic date, pt states he will contact his son about transportation to appointment and will then call the clinic back to confirm appointment date.    ----- Message from Shannan Mae sent at 4/26/2022 10:11 AM CDT -----  Regarding: self [phone]  .Type: Patient Call Back    Who called: self     What is the request in detail: Pt stated that he's having pain from the hernia and would like to discuss sx    Can the clinic reply by [facility]? Call back     Would the patient rather a call back or a response via [facility]? Call back     Best call back number: .[phone]

## 2022-05-05 ENCOUNTER — TELEPHONE (OUTPATIENT)
Dept: FAMILY MEDICINE | Facility: CLINIC | Age: 82
End: 2022-05-05
Payer: MEDICARE

## 2022-05-05 DIAGNOSIS — G89.29 CHRONIC RIGHT-SIDED LOW BACK PAIN WITHOUT SCIATICA: Primary | ICD-10-CM

## 2022-05-05 DIAGNOSIS — J84.9 ILD (INTERSTITIAL LUNG DISEASE): ICD-10-CM

## 2022-05-05 DIAGNOSIS — M54.50 CHRONIC RIGHT-SIDED LOW BACK PAIN WITHOUT SCIATICA: Primary | ICD-10-CM

## 2022-05-05 NOTE — TELEPHONE ENCOUNTER
Please advise patient that I placed a referral for him to the scooter evaluation clinic.   They will determine his ability and eligibility for a scooter.

## 2022-05-05 NOTE — TELEPHONE ENCOUNTER
----- Message from Vero Mercedeskenneth sent at 5/5/2022  1:45 PM CDT -----  Type: Patient Call Back    Who called:self     What is the request in detail:would like to see about getting a scooter    Can the clinic reply by [facility]?no    Would the patient rather a call back or a response via [facility]? self    Best call back number:..[phone] (home)

## 2022-05-17 ENCOUNTER — OFFICE VISIT (OUTPATIENT)
Dept: SURGERY | Facility: CLINIC | Age: 82
End: 2022-05-17
Payer: MEDICARE

## 2022-05-17 VITALS
WEIGHT: 180.31 LBS | DIASTOLIC BLOOD PRESSURE: 71 MMHG | HEIGHT: 68 IN | TEMPERATURE: 98 F | SYSTOLIC BLOOD PRESSURE: 116 MMHG | HEART RATE: 62 BPM | BODY MASS INDEX: 27.33 KG/M2

## 2022-05-17 DIAGNOSIS — K40.90 INGUINAL HERNIA WITHOUT OBSTRUCTION OR GANGRENE, RECURRENCE NOT SPECIFIED, UNSPECIFIED LATERALITY: Primary | ICD-10-CM

## 2022-05-17 PROCEDURE — 1159F PR MEDICATION LIST DOCUMENTED IN MEDICAL RECORD: ICD-10-PCS | Mod: CPTII,S$GLB,, | Performed by: SURGERY

## 2022-05-17 PROCEDURE — 3078F PR MOST RECENT DIASTOLIC BLOOD PRESSURE < 80 MM HG: ICD-10-PCS | Mod: CPTII,S$GLB,, | Performed by: SURGERY

## 2022-05-17 PROCEDURE — 99999 PR PBB SHADOW E&M-EST. PATIENT-LVL V: CPT | Mod: PBBFAC,,, | Performed by: SURGERY

## 2022-05-17 PROCEDURE — 99214 PR OFFICE/OUTPT VISIT, EST, LEVL IV, 30-39 MIN: ICD-10-PCS | Mod: S$GLB,,, | Performed by: SURGERY

## 2022-05-17 PROCEDURE — 1125F PR PAIN SEVERITY QUANTIFIED, PAIN PRESENT: ICD-10-PCS | Mod: CPTII,S$GLB,, | Performed by: SURGERY

## 2022-05-17 PROCEDURE — 1160F RVW MEDS BY RX/DR IN RCRD: CPT | Mod: CPTII,S$GLB,, | Performed by: SURGERY

## 2022-05-17 PROCEDURE — 3078F DIAST BP <80 MM HG: CPT | Mod: CPTII,S$GLB,, | Performed by: SURGERY

## 2022-05-17 PROCEDURE — 1159F MED LIST DOCD IN RCRD: CPT | Mod: CPTII,S$GLB,, | Performed by: SURGERY

## 2022-05-17 PROCEDURE — 1157F ADVNC CARE PLAN IN RCRD: CPT | Mod: CPTII,S$GLB,, | Performed by: SURGERY

## 2022-05-17 PROCEDURE — 1157F PR ADVANCE CARE PLAN OR EQUIV PRESENT IN MEDICAL RECORD: ICD-10-PCS | Mod: CPTII,S$GLB,, | Performed by: SURGERY

## 2022-05-17 PROCEDURE — 3074F PR MOST RECENT SYSTOLIC BLOOD PRESSURE < 130 MM HG: ICD-10-PCS | Mod: CPTII,S$GLB,, | Performed by: SURGERY

## 2022-05-17 PROCEDURE — 99999 PR PBB SHADOW E&M-EST. PATIENT-LVL V: ICD-10-PCS | Mod: PBBFAC,,, | Performed by: SURGERY

## 2022-05-17 PROCEDURE — 3288F PR FALLS RISK ASSESSMENT DOCUMENTED: ICD-10-PCS | Mod: CPTII,S$GLB,, | Performed by: SURGERY

## 2022-05-17 PROCEDURE — 1160F PR REVIEW ALL MEDS BY PRESCRIBER/CLIN PHARMACIST DOCUMENTED: ICD-10-PCS | Mod: CPTII,S$GLB,, | Performed by: SURGERY

## 2022-05-17 PROCEDURE — 1101F PT FALLS ASSESS-DOCD LE1/YR: CPT | Mod: CPTII,S$GLB,, | Performed by: SURGERY

## 2022-05-17 PROCEDURE — 99214 OFFICE O/P EST MOD 30 MIN: CPT | Mod: S$GLB,,, | Performed by: SURGERY

## 2022-05-17 PROCEDURE — 3074F SYST BP LT 130 MM HG: CPT | Mod: CPTII,S$GLB,, | Performed by: SURGERY

## 2022-05-17 PROCEDURE — 3288F FALL RISK ASSESSMENT DOCD: CPT | Mod: CPTII,S$GLB,, | Performed by: SURGERY

## 2022-05-17 PROCEDURE — 1125F AMNT PAIN NOTED PAIN PRSNT: CPT | Mod: CPTII,S$GLB,, | Performed by: SURGERY

## 2022-05-17 PROCEDURE — 1101F PR PT FALLS ASSESS DOC 0-1 FALLS W/OUT INJ PAST YR: ICD-10-PCS | Mod: CPTII,S$GLB,, | Performed by: SURGERY

## 2022-05-17 NOTE — PROGRESS NOTES
82 y/o man with history of left inguinal hernia found on CT scan incidentally and was asymptomatic at that time, however he now reports left inguinal pain that has gotten worse.    PE: reducible left inguinal hernia, no hernia on right.     Impression: reducible left inguinal hernia,     Plan: will need cardiology risk stratification prior to surgery, he is scheduled to see his cardiologist next week.

## 2022-05-18 ENCOUNTER — TELEPHONE (OUTPATIENT)
Dept: PULMONOLOGY | Facility: CLINIC | Age: 82
End: 2022-05-18
Payer: MEDICARE

## 2022-05-18 NOTE — TELEPHONE ENCOUNTER
Left message to call office back.    TERRY Juares  Pulm/Sleep [facility]  [phone]                      ----- Message from Mor Li MA sent at 5/18/2022 11:39 AM CDT -----  Kate Castro Staff  Caller: Unspecified (Today, 11:35 AM)  Type: Patient Call Back     Who called: self     What is the request in detail: Pt is requesting a call back     Can the clinic reply by [facility]?     Would the patient rather a call back or a response via [facility]? Call     Best call back number: [phone] (home)

## 2022-05-24 ENCOUNTER — TELEPHONE (OUTPATIENT)
Dept: FAMILY MEDICINE | Facility: CLINIC | Age: 82
End: 2022-05-24
Payer: MEDICARE

## 2022-05-24 NOTE — TELEPHONE ENCOUNTER
----- Message from Karley Vinson sent at 5/24/2022 12:25 PM CDT -----   Name of Who is Calling:     What is the request in detail:  patient request call back in reference to want to know if immunizations are up to date Please contact to further discuss and advise      Can the clinic reply by [facility]:     [facility]  [phone]

## 2022-05-25 NOTE — TELEPHONE ENCOUNTER
Called pt and informed him his immunizations are up to date, all except his Covid second booster. I offered to schedule his second booster at an [facility] location. He stated he would like to get it at a pharmacy near him so he can just walk in.   Pt understood all info given.

## 2022-05-26 ENCOUNTER — TELEPHONE (OUTPATIENT)
Dept: PULMONOLOGY | Facility: CLINIC | Age: 82
End: 2022-05-26
Payer: MEDICARE

## 2022-05-26 ENCOUNTER — TELEPHONE (OUTPATIENT)
Dept: FAMILY MEDICINE | Facility: CLINIC | Age: 82
End: 2022-05-26
Payer: MEDICARE

## 2022-05-26 NOTE — TELEPHONE ENCOUNTER
Spoke with patient scheduled an appointment to see provider.    TERRY Juares                ----- Message from Josselyn Mae sent at 5/26/2022  2:13 PM CDT -----  Regarding: self  .Type: Patient Call Back    Who called: self     What is the request in detail: [facility] him to get with his Doctor. Please call     Can the clinic reply by [facility]? No     Would the patient rather a call back or a response via [facility]?  Call     Best call back number: .[phone]

## 2022-05-26 NOTE — TELEPHONE ENCOUNTER
----- Message from Lobo Liao sent at 5/26/2022 10:11 AM CDT -----  Name of Who is Calling: JAILENE JIMENES [[id number]]      What is the request in detail: Pt states she needs an order for a CPAP supplies placed in the system. Please contact to further discuss and advise.        Can the clinic reply by [facility]: N      What Number to Call Back if not in [facility]:

## 2022-05-30 ENCOUNTER — TELEPHONE (OUTPATIENT)
Dept: SURGERY | Facility: CLINIC | Age: 82
End: 2022-05-30
Payer: MEDICARE

## 2022-05-30 NOTE — TELEPHONE ENCOUNTER
Spoke with Mr. Ramirez regarding surgery and he states he would like to push having surgery back, I inform him I will inform Dr. Ayala. Pt verbalized understanding.      ----- Message from Kate Brandt sent at 5/30/2022 12:26 PM CDT -----  Type: Patient Call Back    Who called: Self     What is the request in detail: Pt asking for a call back     Can the clinic reply by [facility]?    Would the patient rather a call back or a response via [facility]? Call     Best call back number: [phone] (home)

## 2022-05-31 ENCOUNTER — TELEPHONE (OUTPATIENT)
Dept: SURGERY | Facility: CLINIC | Age: 82
End: 2022-05-31
Payer: MEDICARE

## 2022-05-31 ENCOUNTER — TELEPHONE (OUTPATIENT)
Dept: FAMILY MEDICINE | Facility: CLINIC | Age: 82
End: 2022-05-31
Payer: MEDICARE

## 2022-05-31 NOTE — TELEPHONE ENCOUNTER
Spoke with  He states he would like to come in and speak with Dr. Ayala about surgery he was cleared, appt scheduled for 6/2 at 10am ,pt confirmed date and time.        ----- Message from Shannan Mae sent at 5/31/2022  8:58 AM CDT -----  Regarding: self .[phone]  .Type: Patient Call Back    Who called: self     What is the request in detail: Pt is requesting to know when his sx date will be set     Can the clinic reply by [facility]? Call back     Would the patient rather a call back or a response via [facility]?  Call back     Best call back number: .[phone]

## 2022-05-31 NOTE — TELEPHONE ENCOUNTER
----- Message from Brandon Julien sent at 5/30/2022  1:04 PM CDT -----  Regarding: call back from nurse  Type: Patient Call Back    Who called:Percy     What is the request in detail: the patient is requesting a call back from the nurse of Dr. Edmondson. He has questions about getting approved for surgery to have his hernia removed. The patient would also like to discuss getting medication for his pain. Please return call at earliest convenience.    Can the clinic reply by [facility]?no     Would the patient rather a call back or a response via [facility]? Call back     Best call back number:[phone]

## 2022-06-01 LAB
CHOL/HDLC RATIO: 2.9
CHOLESTEROL, TOTAL: 107
CREATININE RANDOM URINE: 46 MG/DL (ref 20–320)
EGFR IF AFRICAN AMERICAN: 49 ML/MIN/1.73 M2
EST. GFR  (NON AFRICAN AMERICAN): 42 ML/MIN/1.73 M2
HBA1C MFR BLD: 8.4 % (ref 4–5.6)
HDLC SERPL-MCNC: 37 MG/DL
LDLC SERPL CALC-MCNC: 53 MG/DL
MICROALBUMIN URINE RANDOM: 2.7
MICROALBUMIN/CREATININE RATIO: 59 UG/MG
NONHDLC SERPL-MCNC: 70 MG/DL
TRIGL SERPL-MCNC: 87 MG/DL

## 2022-06-02 ENCOUNTER — TELEPHONE (OUTPATIENT)
Dept: FAMILY MEDICINE | Facility: CLINIC | Age: 82
End: 2022-06-02
Payer: MEDICARE

## 2022-06-02 ENCOUNTER — OFFICE VISIT (OUTPATIENT)
Dept: SURGERY | Facility: CLINIC | Age: 82
End: 2022-06-02
Payer: MEDICARE

## 2022-06-02 VITALS
HEART RATE: 73 BPM | BODY MASS INDEX: 27 KG/M2 | SYSTOLIC BLOOD PRESSURE: 112 MMHG | DIASTOLIC BLOOD PRESSURE: 65 MMHG | WEIGHT: 178.13 LBS | HEIGHT: 68 IN

## 2022-06-02 DIAGNOSIS — K40.90 INGUINAL HERNIA WITHOUT OBSTRUCTION OR GANGRENE, RECURRENCE NOT SPECIFIED, UNSPECIFIED LATERALITY: Primary | ICD-10-CM

## 2022-06-02 PROCEDURE — 3074F PR MOST RECENT SYSTOLIC BLOOD PRESSURE < 130 MM HG: ICD-10-PCS | Mod: CPTII,S$GLB,, | Performed by: SURGERY

## 2022-06-02 PROCEDURE — 99999 PR PBB SHADOW E&M-EST. PATIENT-LVL IV: CPT | Mod: PBBFAC,,, | Performed by: SURGERY

## 2022-06-02 PROCEDURE — 1159F MED LIST DOCD IN RCRD: CPT | Mod: CPTII,S$GLB,, | Performed by: SURGERY

## 2022-06-02 PROCEDURE — 99999 PR PBB SHADOW E&M-EST. PATIENT-LVL IV: ICD-10-PCS | Mod: PBBFAC,,, | Performed by: SURGERY

## 2022-06-02 PROCEDURE — 1125F PR PAIN SEVERITY QUANTIFIED, PAIN PRESENT: ICD-10-PCS | Mod: CPTII,S$GLB,, | Performed by: SURGERY

## 2022-06-02 PROCEDURE — 3078F PR MOST RECENT DIASTOLIC BLOOD PRESSURE < 80 MM HG: ICD-10-PCS | Mod: CPTII,S$GLB,, | Performed by: SURGERY

## 2022-06-02 PROCEDURE — 1157F PR ADVANCE CARE PLAN OR EQUIV PRESENT IN MEDICAL RECORD: ICD-10-PCS | Mod: CPTII,S$GLB,, | Performed by: SURGERY

## 2022-06-02 PROCEDURE — 1157F ADVNC CARE PLAN IN RCRD: CPT | Mod: CPTII,S$GLB,, | Performed by: SURGERY

## 2022-06-02 PROCEDURE — 1159F PR MEDICATION LIST DOCUMENTED IN MEDICAL RECORD: ICD-10-PCS | Mod: CPTII,S$GLB,, | Performed by: SURGERY

## 2022-06-02 PROCEDURE — 3074F SYST BP LT 130 MM HG: CPT | Mod: CPTII,S$GLB,, | Performed by: SURGERY

## 2022-06-02 PROCEDURE — 3078F DIAST BP <80 MM HG: CPT | Mod: CPTII,S$GLB,, | Performed by: SURGERY

## 2022-06-02 PROCEDURE — 99213 PR OFFICE/OUTPT VISIT, EST, LEVL III, 20-29 MIN: ICD-10-PCS | Mod: S$GLB,,, | Performed by: SURGERY

## 2022-06-02 PROCEDURE — 1125F AMNT PAIN NOTED PAIN PRSNT: CPT | Mod: CPTII,S$GLB,, | Performed by: SURGERY

## 2022-06-02 PROCEDURE — 99213 OFFICE O/P EST LOW 20 MIN: CPT | Mod: S$GLB,,, | Performed by: SURGERY

## 2022-06-02 NOTE — PROGRESS NOTES
82 y/o man with history of inguinal hernia, had eval by cardiology who cleared him but with moderated increased risk.  He is here today to discuss surgery.  He is at higher risk of complication, but if his symptoms are severe enough I am willing to repair his hernia.    But at the current time, risks outweigh potential benefits.    He will call clinic if this changes.

## 2022-06-02 NOTE — TELEPHONE ENCOUNTER
Spoke to Blanca at [facility], asked what supplies pt is asking to be filled for his CPAP, marked for refill on faxed over paper by N.   Ready for faxing.

## 2022-06-02 NOTE — TELEPHONE ENCOUNTER
----- Message from Vero Black sent at 6/2/2022 10:51 AM CDT -----  Type:  Diabetic/Medical Supplies Request    Name of Caller: Blanca    What supplies are needed: C-Pap     What is the brand of the supplies: no brand    Who prescribed the original supplies Dr Edmondson    Pharmacy/Company Name, Phone #, Location: [facility]    Requesting a Call Back: yes    Would the patient rather a call back or a response via [facility]? call    Best Call Back Number:[phone]

## 2022-06-07 ENCOUNTER — OFFICE VISIT (OUTPATIENT)
Dept: PODIATRY | Facility: CLINIC | Age: 82
End: 2022-06-07
Payer: MEDICARE

## 2022-06-07 VITALS — WEIGHT: 178 LBS | HEIGHT: 68 IN | BODY MASS INDEX: 26.98 KG/M2

## 2022-06-07 DIAGNOSIS — L60.0 INGROWN NAIL: ICD-10-CM

## 2022-06-07 DIAGNOSIS — M20.42 HAMMER TOES OF BOTH FEET: ICD-10-CM

## 2022-06-07 DIAGNOSIS — M20.41 HAMMER TOES OF BOTH FEET: ICD-10-CM

## 2022-06-07 DIAGNOSIS — M20.12 HALLUX ABDUCTO VALGUS, LEFT: ICD-10-CM

## 2022-06-07 DIAGNOSIS — E11.49 TYPE II DIABETES MELLITUS WITH NEUROLOGICAL MANIFESTATIONS: ICD-10-CM

## 2022-06-07 DIAGNOSIS — M20.11 HALLUX ABDUCTO VALGUS, RIGHT: ICD-10-CM

## 2022-06-07 DIAGNOSIS — L84 CORN OR CALLUS: ICD-10-CM

## 2022-06-07 DIAGNOSIS — B35.1 ONYCHOMYCOSIS DUE TO DERMATOPHYTE: ICD-10-CM

## 2022-06-07 PROCEDURE — 1160F RVW MEDS BY RX/DR IN RCRD: CPT | Mod: CPTII,S$GLB,, | Performed by: PODIATRIST

## 2022-06-07 PROCEDURE — 1157F PR ADVANCE CARE PLAN OR EQUIV PRESENT IN MEDICAL RECORD: ICD-10-PCS | Mod: CPTII,S$GLB,, | Performed by: PODIATRIST

## 2022-06-07 PROCEDURE — 1160F PR REVIEW ALL MEDS BY PRESCRIBER/CLIN PHARMACIST DOCUMENTED: ICD-10-PCS | Mod: CPTII,S$GLB,, | Performed by: PODIATRIST

## 2022-06-07 PROCEDURE — 1126F AMNT PAIN NOTED NONE PRSNT: CPT | Mod: CPTII,S$GLB,, | Performed by: PODIATRIST

## 2022-06-07 PROCEDURE — 99999 PR PBB SHADOW E&M-EST. PATIENT-LVL IV: CPT | Mod: PBBFAC,,, | Performed by: PODIATRIST

## 2022-06-07 PROCEDURE — 1159F MED LIST DOCD IN RCRD: CPT | Mod: CPTII,S$GLB,, | Performed by: PODIATRIST

## 2022-06-07 PROCEDURE — 11721 DEBRIDE NAIL 6 OR MORE: CPT | Mod: 59,Q9,S$GLB, | Performed by: PODIATRIST

## 2022-06-07 PROCEDURE — 1101F PT FALLS ASSESS-DOCD LE1/YR: CPT | Mod: CPTII,S$GLB,, | Performed by: PODIATRIST

## 2022-06-07 PROCEDURE — 1159F PR MEDICATION LIST DOCUMENTED IN MEDICAL RECORD: ICD-10-PCS | Mod: CPTII,S$GLB,, | Performed by: PODIATRIST

## 2022-06-07 PROCEDURE — 99499 UNLISTED E&M SERVICE: CPT | Mod: S$GLB,,, | Performed by: PODIATRIST

## 2022-06-07 PROCEDURE — 11721 PR DEBRIDEMENT OF NAILS, 6 OR MORE: ICD-10-PCS | Mod: 59,Q9,S$GLB, | Performed by: PODIATRIST

## 2022-06-07 PROCEDURE — 99499 NO LOS: ICD-10-PCS | Mod: S$GLB,,, | Performed by: PODIATRIST

## 2022-06-07 PROCEDURE — 11056 PARNG/CUTG B9 HYPRKR LES 2-4: CPT | Mod: Q9,S$GLB,, | Performed by: PODIATRIST

## 2022-06-07 PROCEDURE — 11056 PR TRIM BENIGN HYPERKERATOTIC SKIN LESION,2-4: ICD-10-PCS | Mod: Q9,S$GLB,, | Performed by: PODIATRIST

## 2022-06-07 PROCEDURE — 1101F PR PT FALLS ASSESS DOC 0-1 FALLS W/OUT INJ PAST YR: ICD-10-PCS | Mod: CPTII,S$GLB,, | Performed by: PODIATRIST

## 2022-06-07 PROCEDURE — 1126F PR PAIN SEVERITY QUANTIFIED, NO PAIN PRESENT: ICD-10-PCS | Mod: CPTII,S$GLB,, | Performed by: PODIATRIST

## 2022-06-07 PROCEDURE — 99999 PR PBB SHADOW E&M-EST. PATIENT-LVL IV: ICD-10-PCS | Mod: PBBFAC,,, | Performed by: PODIATRIST

## 2022-06-07 PROCEDURE — 1157F ADVNC CARE PLAN IN RCRD: CPT | Mod: CPTII,S$GLB,, | Performed by: PODIATRIST

## 2022-06-07 PROCEDURE — 3288F PR FALLS RISK ASSESSMENT DOCUMENTED: ICD-10-PCS | Mod: CPTII,S$GLB,, | Performed by: PODIATRIST

## 2022-06-07 PROCEDURE — 3288F FALL RISK ASSESSMENT DOCD: CPT | Mod: CPTII,S$GLB,, | Performed by: PODIATRIST

## 2022-06-07 NOTE — PROGRESS NOTES
Subjective:      Patient ID: Percy Ramirez is a 81 y.o. male.    Chief Complaint: Diabetes Mellitus (PCP  04/04/2022) and Nail Care    Percy is a 81 y.o. male who presents to the clinic for evaluation and treatment of high risk feet. Percy has a past medical history of Allergy, Arthritis, CAD, multiple vessel, Cataract, Depression, History of colon polyps, Hyperlipidemia, Hypertension, Macular degeneration, S/P CABG x 2, and Type 2 diabetes mellitus with circulatory disorder. The patient's chief complaint is elongated, thickened toenails aggravated by increased weight bearing, shoe gear, pressure. This patient has documented high risk feet requiring routine maintenance secondary to diabetes mellitis and those secondary complications of diabetes, as mentioned..    PCP: Kasie Edmondson MD    Date Last Seen by PCP:   Chief Complaint   Patient presents with    Diabetes Mellitus     PCP  04/04/2022    Nail Care     Current shoe gear:  rx shoes, worn (new shoes at home)    Hemoglobin A1C   Date Value Ref Range Status   04/01/2022 8.3 (H) 4.0 - 5.6 % Final     Comment:     ADA Screening Guidelines:  5.7-6.4%  Consistent with prediabetes  >or=6.5%  Consistent with diabetes    High levels of fetal hemoglobin interfere with the HbA1C  assay. Heterozygous hemoglobin variants (HbS, HgC, etc)do  not significantly interfere with this assay.   However, presence of multiple variants may affect accuracy.     12/01/2021 8.4 (H) 4.0 - 5.6 % Final     Comment:     [facility]   06/01/2021 8.2 (H) 4.0 - 5.6 % Final     Comment:     [facility]   02/19/2021 9.4 (H) 4.0 - 5.6 % Final     Comment:     [facility]     Patient Active Problem List   Diagnosis    Major depressive disorder, recurrent episode, mild    Benign hypertension with chronic kidney disease, stage III    Type 2 diabetes, uncontrolled, with retinopathy    Coronary artery disease    S/P CABG x 2    Macular degeneration    Obstructive sleep  "apnea treated with BiPAP    Anxiety state, unspecified    Insulin long-term use    Primary osteoarthritis of both knees    Chronic low back pain    DJD (degenerative joint disease), lumbar    Type 2 diabetes, uncontrolled, with neuropathy    Bilateral carotid artery disease    Hyperlipidemia LDL goal <100    Type 2 diabetes, uncontrolled, with peripheral circulatory disorder    COPD with chronic bronchitis and emphysema    Atherosclerosis of abdominal aorta    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    Overweight (BMI 25.0-29.9)    Persistent atrial fibrillation    Chronic stable angina    Senile purpura    Osteoarthritis of carpometacarpal (CMC) joint of left thumb    MCI (mild cognitive impairment)    Chronic vertigo    Pulmonary nodule    Dyspnea on exertion    Chronic combined systolic and diastolic heart failure    Ambulates with cane    ILD (interstitial lung disease)    History of cardioversion    Erectile dysfunction    Secondary hyperparathyroidism of renal origin    Noncompliance with medication regimen    Exudative age-related macular degeneration, right eye, with active choroidal neovascularization    History of stroke    Chronic tension-type headache, intractable     Current Outpatient Medications on File Prior to Visit   Medication Sig Dispense Refill    acetaminophen (TYLENOL) 325 MG tablet Take 2 tablets (650 mg total) by mouth every 6 (six) hours as needed. 13 tablet 0    albuterol (PROVENTIL/VENTOLIN HFA) 90 mcg/actuation inhaler INHALE 2 PUFFS BY MOUTH EVERY 6 HOURS AS NEEDED FOR WHEEZING OR  SHORTNESS  OF  BREATH 54 g 0    atorvastatin (LIPITOR) 40 MG tablet Take 40 mg by mouth once daily.      BD INSULIN PEN NEEDLE UF SHORT 31 gauge x 5/16" Ndle       BD INSULIN SYRINGE ULTRA-FINE 1/2 mL 31 gauge x 15/64" Syrg       blood sugar diagnostic Strp To check BG 3 times daily, to use with insurance preferred meter 200 strip 11    " "carvedilol (COREG) 25 MG tablet Take 1 tablet (25 mg total) by mouth 2 (two) times daily. 180 tablet 0    cinnamon bark 500 mg capsule Take 1,000 mg by mouth once daily.        clopidogreL (PLAVIX) 75 mg tablet Take 75 mg by mouth.      diclofenac sodium (VOLTAREN) 1 % Gel Apply 2 g topically 2 (two) times daily. 100 g 0    diclofenac sodium (VOLTAREN) 1 % Gel Apply 2 g topically 3 (three) times daily. 50 g 0    ENTRESTO  mg per tablet Take 1 tablet by mouth 2 (two) times daily.      fluticasone propionate (FLONASE) 50 mcg/actuation nasal spray 1 spray (50 mcg total) by Each Nostril route 2 (two) times daily. 18.2 mL 3    furosemide (LASIX) 40 MG tablet Take 40 mg by mouth once daily.      gabapentin (NEURONTIN) 300 MG capsule Take 4 capsules (1,200 mg total) by mouth 2 (two) times daily. (Patient taking differently: Take 900 mg by mouth 2 (two) times daily.) 540 capsule 1    glimepiride (AMARYL) 4 MG tablet Take 4 mg by mouth daily with breakfast.       HYDROcodone-acetaminophen (NORCO) 5-325 mg per tablet Take 1 tablet by mouth every 4 (four) hours as needed for Pain. Causes drowsiness. Do not drive or operate machinery with this medication. Do not drink alcohol with this medication. Causes constipation. Take with stool softener. 10 tablet 0    insulin NPH-insulin regular, 70/30, (NOVOLIN 70/30) 100 unit/mL (70-30) injection Inject into the skin. Inject 10 units at breakfast and inject 10 units at night      insulin syringe-needle U-100 0.3 mL 31 gauge x 15/64" Syrg USE TO INJECT INSULIN THREE TIMES DAILY      insulin syringe-needle U-100 1/2 mL 31 x 5/16" Syrg       isosorbide mononitrate (IMDUR) 30 MG 24 hr tablet Take 30 mg by mouth once daily.      lancets Misc To check BG 3 times daily, to use with insurance preferred meter 200 each 11    LIDOcaine (LIDODERM) 5 % Place 1 patch onto the skin once daily. Remove & Discard patch within 12 hours or as directed by MD Townsend patch 1    metformin " (GLUCOPHAGE) 500 MG tablet Take 1,000 mg by mouth 2 (two) times daily with meals. 2 Tablets in the morning, 2 Tablets in the evening      metoclopramide HCl (REGLAN) 5 MG tablet Take 5 mg by mouth 3 (three) times daily as needed.      nitroGLYCERIN (NITROSTAT) 0.4 MG SL tablet DISSOLVE 1 TABLET UNDER THE TONGUE EVERY 5 MINUTES AS  NEEDED FOR CHEST PAIN. MAX  OF 3 TABLETS IN 15 MINUTES. CALL 911 IF PAIN PERSISTS.      pravastatin (PRAVACHOL) 20 MG tablet Take 1 tablet (20 mg total) by mouth once daily. 90 tablet 1    rivastigmine tartrate (EXELON) 1.5 MG capsule TAKE 1 CAPSULE BY MOUTH  TWICE DAILY 180 capsule 3    spironolactone (ALDACTONE) 25 MG tablet Take 25 mg by mouth.      VIT C/VIT E AC/LUT/COPPER/ZINC (PRESERVISION LUTEIN ORAL) Take by mouth.      warfarin (COUMADIN) 5 MG tablet Take 2 tabs by mouth on Tuesday,Thursday, Saturday and Sundays then 1.5 on all other days.      blood-glucose meter kit To check BG 3 times daily, to use with insurance preferred meter 1 each 0    diazePAM (VALIUM) 2 MG tablet Take 1 tablet (2 mg total) by mouth every 12 (twelve) hours as needed for Anxiety (Dizziness). 20 tablet 0    DULoxetine (CYMBALTA) 60 MG capsule Take 1 capsule (60 mg total) by mouth once daily. 90 capsule 3     No current facility-administered medications on file prior to visit.     Review of patient's allergies indicates:   Allergen Reactions    Codeine Nausea Only     weak     Past Surgical History:   Procedure Laterality Date    broken elbow      left    COLONOSCOPY N/A 10/4/2017    Procedure: COLONOSCOPY;  Surgeon: Gabriel Leung MD;  Location: [facility];  Service: Endoscopy;  Laterality: N/A;    EYE SURGERY      cataract    heart bypass      2004    HERNIA REPAIR      right    ROTATOR CUFF REPAIR      right  2004     Family History   Problem Relation Age of Onset    Arthritis Mother     Diabetes Mother     Stroke Mother     Heart attack Father     Cancer Brother     Throat  "cancer Brother     Diabetes Maternal Aunt     Diabetes Maternal Uncle     Heart disease Maternal Uncle     Diabetes Paternal Aunt     Heart disease Paternal Aunt     Heart disease Paternal Uncle     Heart disease Maternal Grandmother     Cancer Maternal Grandfather      Social History     Socioeconomic History    Marital status:     Number of children: 4    Years of education: GED   Occupational History    Occupation: retired tug    Tobacco Use    Smoking status: Former Smoker     Packs/day: 3.00     Years: 45.00     Pack years: 135.00     Types: Cigarettes     Quit date: 2004     Years since quittin.1    Smokeless tobacco: Never Used   Substance and Sexual Activity    Alcohol use: Yes     Comment: was heavy drinker 35 years ago, rare use now    Drug use: No    Sexual activity: Yes     Partners: Female     Review of Systems   Constitution: Negative for chills, fever and weakness.   Cardiovascular: Negative for claudication and leg swelling.   Respiratory: Positive for cough. Negative for shortness of breath.    Skin: Positive for dry skin and nail changes. Negative for itching and rash.   Musculoskeletal: Positive for arthritis, back pain and joint pain. Negative for falls, joint swelling and muscle weakness.   Gastrointestinal: Negative for diarrhea, nausea and vomiting.   Neurological: Positive for numbness and paresthesias. Negative for tremors.   Psychiatric/Behavioral: Negative for altered mental status and hallucinations.           Objective:       Vitals:    22 0900   Weight: 80.7 kg (178 lb)   Height: 5' 8" (1.727 m)   PainSc: 0-No pain       Physical Exam   Constitutional:   General: Pt. is well-developed, well-nourished, appears stated age, in no acute distress, alert and oriented x 3. No evidence of depression, anxiety, or agitation. Calm, cooperative, and communicative. Appropriate interactions and affect.       Cardiovascular:   Pulses:       Dorsalis " pedis pulses are 2+ on the right side, and 2+ on the left side.        Posterior tibial pulses are 1+ on the right side, and 1+ on the left side.   There is decreased digital hair.   Musculoskeletal:        Right foot: There is normal range of motion.        Left foot:  There is normal range of motion.   Muscle strength is 5/5 in all groups bilaterally.    Decreased stride, station of gait.  apropulsive toe off.  Increased angle and base of gait.    Patient has hammertoes of digits 2-5 bilateral partially reducible without symptom today.    Visible and palpable bunion without pain at dorsomedial 1st metatarsal head right and left.  Hallux abducted right and left partially reducible, tracks laterally without being track bound.  No ecchymosis, erythema, edema, or cardinal signs infection or signs of trauma same foot.     Neurological: A sensory deficit is present.   Villisca-Keon 5.07 monofilamant testing is diminished Farhad feet. Sharp/dull sensation diminished Bilaterally   Skin: Skin is warm, dry. No abrasion, no ecchymosis, no rash noted. He is not diaphoretic. No cyanosis. No pallor. Nails show no clubbing.   Medial and lateral hallux nail margin of right foot with ingrown nail plate and thick HPK. Surrounding erythema and minimal edema is noted there is no granuloma formation noted. no malodor     Toenails 1-5 bilaterally are elongated by 2-3 mm, thickened by 2-3 mm, discolored/yellowed, dystrophic, brittle with subungual debris.    Focal hyperkeratotic lesion consisting entirely of hyperkeratotic tissue without open skin, drainage, pus, fluctuance, malodor, or signs of infection: medial IPJ of hallux farhad    Interdigital Spaces clean, dry and without evidence of break in skin integrity   Psychiatric: He has a normal mood and affect. His speech is normal.   Nursing note and vitals reviewed.        Assessment:       Encounter Diagnoses   Name Primary?    Type 2 diabetes, uncontrolled, with peripheral circulatory  disorder Yes    Type II diabetes mellitus with neurological manifestations     Hammer toes of both feet     Hallux abducto valgus, left     Hallux abducto valgus, right     Onychomycosis due to dermatophyte     Corn or callus     Ingrown nail          Plan:       Percy was seen today for diabetes mellitus and nail care.    Diagnoses and all orders for this visit:    Type 2 diabetes, uncontrolled, with peripheral circulatory disorder    Type II diabetes mellitus with neurological manifestations    Hammer toes of both feet    Hallux abducto valgus, left    Hallux abducto valgus, right    Onychomycosis due to dermatophyte    Corn or callus    Ingrown nail      I counseled the patient on his conditions, their implications and medical management.      - Shoe inspection. Diabetic Foot Education. Patient reminded of the importance of good nutrition and blood sugar control to help prevent podiatric complications of diabetes. Patient instructed on proper foot hygeine. We discussed wearing proper shoe gear, daily foot inspections, never walking without protective shoe gear, never putting sharp instruments to feet    - With patient's permission, nails were aggressively reduced and debrided x 10 to their soft tissue attachment mechanically and with electric , removing all offending nail and debris. Patient relates relief following the procedure. Utilizing sterile toenail clippers I aggressively trimmed  the offending right hallux  nail border approximately 3 mm from its edge and carried the nail plate incision down at an angle in order to wedge out the offending cryptotic portion of the nail plate. The offending border was then removed in toto. The remaining nail was grinded down with an electric  down to nail bed.  Silver nitrate to any abrasions. Patient tolerated the procedure well and related significant relief.    - After cleansing the  area w/ alcohol prep pad the above mentioned hyperkeratosis was  trimmed utilizing No 15 scapel, to a smooth base with out incident. Patient tolerated this  well and reported comfort to the area of medial hallux IPJ concha    - He will continue to monitor the areas daily, inspect his feet, wear protective shoe gear when ambulatory, moisturizer to maintain skin integrity and follow in this office in approximately 2-3 months, sooner PRN

## 2022-06-08 ENCOUNTER — TELEPHONE (OUTPATIENT)
Dept: PULMONOLOGY | Facility: CLINIC | Age: 82
End: 2022-06-08
Payer: MEDICARE

## 2022-06-08 NOTE — TELEPHONE ENCOUNTER
Returned call no answer.                      ----- Message from Mor Li MA sent at 6/7/2022 11:19 AM CDT -----  Cristian Templeton V Staff  Caller: Unspecified (Today, 11:11 AM)  Who called?:PT       What is the request in detail:PT would like to speak with staff about receiving supplies for his cpap machine. Please advise         Can the clinic reply by [facility]?:No         Best Call Back Number: [phone]

## 2022-06-15 ENCOUNTER — TELEPHONE (OUTPATIENT)
Dept: FAMILY MEDICINE | Facility: CLINIC | Age: 82
End: 2022-06-15
Payer: MEDICARE

## 2022-06-15 NOTE — TELEPHONE ENCOUNTER
----- Message from Josselyn Mae sent at 6/14/2022 12:32 PM CDT -----  Regarding: self  .Type: Patient Call Back    Who called self     What is the request in detail: states his tongue has been nurning. Offered appt did not want said he just wants to speak with nurse.     Can the clinic reply by [facility]? No     Would the patient rather a call back or a response via [facility]? Call     Best call back number: .[phone]        .  [facility] [address] [place], [place] [address]BRANDIE faith  [address] [facility]  [address] [place] [address]  Phone: [phone] Fax: [phone]

## 2022-06-17 ENCOUNTER — OFFICE VISIT (OUTPATIENT)
Dept: PULMONOLOGY | Facility: CLINIC | Age: 82
End: 2022-06-17
Payer: MEDICARE

## 2022-06-17 VITALS
HEIGHT: 68 IN | HEART RATE: 70 BPM | SYSTOLIC BLOOD PRESSURE: 135 MMHG | WEIGHT: 181.56 LBS | OXYGEN SATURATION: 94 % | DIASTOLIC BLOOD PRESSURE: 73 MMHG | BODY MASS INDEX: 27.52 KG/M2

## 2022-06-17 DIAGNOSIS — R91.1 PULMONARY NODULE: ICD-10-CM

## 2022-06-17 DIAGNOSIS — J84.9 ILD (INTERSTITIAL LUNG DISEASE): ICD-10-CM

## 2022-06-17 DIAGNOSIS — J44.89 COPD WITH CHRONIC BRONCHITIS AND EMPHYSEMA: Primary | ICD-10-CM

## 2022-06-17 DIAGNOSIS — G47.33 OBSTRUCTIVE SLEEP APNEA TREATED WITH BIPAP: ICD-10-CM

## 2022-06-17 DIAGNOSIS — J43.9 COPD WITH CHRONIC BRONCHITIS AND EMPHYSEMA: Primary | ICD-10-CM

## 2022-06-17 PROCEDURE — 99999 PR PBB SHADOW E&M-EST. PATIENT-LVL V: CPT | Mod: PBBFAC,,, | Performed by: NURSE PRACTITIONER

## 2022-06-17 PROCEDURE — 99999 PR PBB SHADOW E&M-EST. PATIENT-LVL V: ICD-10-PCS | Mod: PBBFAC,,, | Performed by: NURSE PRACTITIONER

## 2022-06-17 PROCEDURE — 3078F DIAST BP <80 MM HG: CPT | Mod: CPTII,S$GLB,, | Performed by: NURSE PRACTITIONER

## 2022-06-17 PROCEDURE — 1125F AMNT PAIN NOTED PAIN PRSNT: CPT | Mod: CPTII,S$GLB,, | Performed by: NURSE PRACTITIONER

## 2022-06-17 PROCEDURE — 99214 PR OFFICE/OUTPT VISIT, EST, LEVL IV, 30-39 MIN: ICD-10-PCS | Mod: S$GLB,,, | Performed by: NURSE PRACTITIONER

## 2022-06-17 PROCEDURE — 1101F PT FALLS ASSESS-DOCD LE1/YR: CPT | Mod: CPTII,S$GLB,, | Performed by: NURSE PRACTITIONER

## 2022-06-17 PROCEDURE — 3075F PR MOST RECENT SYSTOLIC BLOOD PRESS GE 130-139MM HG: ICD-10-PCS | Mod: CPTII,S$GLB,, | Performed by: NURSE PRACTITIONER

## 2022-06-17 PROCEDURE — 1125F PR PAIN SEVERITY QUANTIFIED, PAIN PRESENT: ICD-10-PCS | Mod: CPTII,S$GLB,, | Performed by: NURSE PRACTITIONER

## 2022-06-17 PROCEDURE — 1157F ADVNC CARE PLAN IN RCRD: CPT | Mod: CPTII,S$GLB,, | Performed by: NURSE PRACTITIONER

## 2022-06-17 PROCEDURE — 3288F FALL RISK ASSESSMENT DOCD: CPT | Mod: CPTII,S$GLB,, | Performed by: NURSE PRACTITIONER

## 2022-06-17 PROCEDURE — 3078F PR MOST RECENT DIASTOLIC BLOOD PRESSURE < 80 MM HG: ICD-10-PCS | Mod: CPTII,S$GLB,, | Performed by: NURSE PRACTITIONER

## 2022-06-17 PROCEDURE — 3288F PR FALLS RISK ASSESSMENT DOCUMENTED: ICD-10-PCS | Mod: CPTII,S$GLB,, | Performed by: NURSE PRACTITIONER

## 2022-06-17 PROCEDURE — 1101F PR PT FALLS ASSESS DOC 0-1 FALLS W/OUT INJ PAST YR: ICD-10-PCS | Mod: CPTII,S$GLB,, | Performed by: NURSE PRACTITIONER

## 2022-06-17 PROCEDURE — 99214 OFFICE O/P EST MOD 30 MIN: CPT | Mod: S$GLB,,, | Performed by: NURSE PRACTITIONER

## 2022-06-17 PROCEDURE — 3075F SYST BP GE 130 - 139MM HG: CPT | Mod: CPTII,S$GLB,, | Performed by: NURSE PRACTITIONER

## 2022-06-17 PROCEDURE — 1157F PR ADVANCE CARE PLAN OR EQUIV PRESENT IN MEDICAL RECORD: ICD-10-PCS | Mod: CPTII,S$GLB,, | Performed by: NURSE PRACTITIONER

## 2022-06-17 NOTE — PROGRESS NOTES
HISTORY OF PRESENT ILLNESS: Percy Ramirez is a 81 y.o. male with pmh of tobacco abuse, copd, cad, tyler on bipap, htn, dyslipidemia, dm2 is here for annual management copd and TYLER.      LOV Dr. Alexandre 9/01/2020: Our first encounter was 1/24/13.  At that time, patient with chronic dyspnea.  However, patient stated symptoms have gradually worsen since 2012.  +pelletier x 1/2.  No fever/chills.  No wheezing.  No coughing.  No orthopnea.  No pnd.  Patient with h/o 3 ppd x 40 years.  Quit smoking 2004.      In the past, patient was prescribed spiriva.  However, patient did not filled due to cost.  Currently on albuterol.  Patient underwent LDCT screening 2/11/19 by Dr. Edmondson.  CT demonstrated several ggo.      During last visit,  Patient have not fill spiriva due to cost.  Patient was provided prescription for trelegy.  copay is $45.  Have been using albuterol 3 times daily.  No fever/chil.  No chest .  Still with significant dyspnea.  Breathing is better with trelegy.  No coughing or wheezing.       Interval history 6/17/2022:  Patient is watching his salt intake to manage his shortness of breath.  Shortness of breath on exertion is improved if he cuts down on salt intake.  He is not taking the trelegy.  He did not find it very helpful and too expensive.  Patient is able to manage all activities of daily living independently and has no hospital exacerbation. He reports no flare up on interim. He uses albuterol as needed.  PFT 5/15/20 ratio of 54%; fev1 1.60 (57%); fvc 80%; tlc 77%; dlco 34%  6 min 5/15/20 165 96-89-91 on room     Split night study from  3/21/2017: AHI 31.7, Effective control acieved with cpap 14 cm H2O  CPAP titration study 8/17/2019:  Effective control of sleep disordered breathing was achieved with BPAP at 16/10 cm of water.   Pt on bipap 16/10 cflex 3 using nightly and feeling rested on bipap.   [employer]  [id number]  Replacement issued 4/15/2022  Compliance Summary  4/15/2022 - 7/13/2022 (90  days)  Days with Device Usage 90 days  Days without Device Usage 0 days  Percent Days with Device Usage 100.0%  Cumulative Usage 27 days 6 hrs. 30 mins. 59 secs.  Maximum Usage (1 Day) 11 hrs. 12 mins. 47 secs.  Average Usage (All Days) 7 hrs. 16 mins. 20 secs.  Average Usage (Days Used) 7 hrs. 16 mins. 20 secs.  Minimum Usage (1 Day) 1 hrs. 13 mins. 26 secs.  Percent of Days with Usage >= 4 Hours 94.4%  Percent of Days with Usage < 4 Hours 5.6%  Date Range  Total Blower Time 27 days 7 hrs. 50 mins. 38 secs.  Bi-Level Summary  Average Time in Large Leak Per Day 32 mins. 1 secs.  Average AHI 5.1  EPAP 10.0 cmH2O  IPAP 16.0 cmH2O    PAST MEDICAL HISTORY:    Active Ambulatory Problems     Diagnosis Date Noted    Major depressive disorder, recurrent episode, mild     Benign hypertension with chronic kidney disease, stage III     Type 2 diabetes, uncontrolled, with retinopathy     Coronary artery disease     S/P CABG x 2     Macular degeneration     Obstructive sleep apnea treated with BiPAP 07/27/2012    Anxiety state, unspecified 10/24/2013    Insulin long-term use 02/16/2015    Primary osteoarthritis of both knees 02/25/2015    Chronic low back pain 02/25/2015    DJD (degenerative joint disease), lumbar 04/30/2015    Type 2 diabetes, uncontrolled, with neuropathy 07/29/2016    Bilateral carotid artery disease 07/29/2016    Hyperlipidemia LDL goal <100 07/29/2016    Type 2 diabetes, uncontrolled, with peripheral circulatory disorder 07/29/2016    COPD with chronic bronchitis and emphysema 07/29/2016    Atherosclerosis of abdominal aorta 07/29/2016    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 07/29/2016    Overweight (BMI 25.0-29.9) 07/29/2016    Persistent atrial fibrillation 03/06/2017    Chronic stable angina 03/06/2017    Senile purpura 03/06/2017    Osteoarthritis of carpometacarpal (CMC) joint of left thumb 11/22/2017    MCI (mild cognitive impairment)  06/18/2018    Chronic vertigo 02/11/2019    Pulmonary nodule 03/20/2019    Dyspnea on exertion 03/20/2019    Chronic combined systolic and diastolic heart failure 11/30/2018    Ambulates with cane 11/06/2019    ILD (interstitial lung disease) 05/12/2020    History of cardioversion 09/23/2020    Erectile dysfunction 01/24/2020    Secondary hyperparathyroidism of renal origin 11/06/2020    Noncompliance with medication regimen 11/06/2020    Exudative age-related macular degeneration, right eye, with active choroidal neovascularization 03/29/2022    History of stroke 04/04/2022    Chronic tension-type headache, intractable 04/08/2022     Resolved Ambulatory Problems     Diagnosis Date Noted    Combined hyperlipidemia associated with type 2 diabetes mellitus     Fatigue 07/27/2012    Depressive disorder, not elsewhere classified 10/24/2013    Arrhythmia 12/30/2013    Dizziness of unknown cause 12/30/2013    Hypotension 06/13/2014    Hyponatremia 06/14/2014    Diabetic retinopathy of both eyes 10/23/2014    Diabetic neuropathy 02/25/2015    Poor motor control of trunk 03/11/2015    IT band syndrome 03/11/2015    Impairment of balance 03/11/2015    Colon cancer screening 10/04/2017    Medication intolerance 09/26/2019     Past Medical History:   Diagnosis Date    Allergy     Arthritis     CAD, multiple vessel     Cataract     Depression     History of colon polyps     Hyperlipidemia     Hypertension     Type 2 diabetes mellitus with circulatory disorder                 PAST SURGICAL HISTORY:    Past Surgical History:   Procedure Laterality Date    broken elbow      left    COLONOSCOPY N/A 10/4/2017    Procedure: COLONOSCOPY;  Surgeon: Gabriel Leung MD;  [facility];  Service: Endoscopy;  Laterality: N/A;    EYE SURGERY      cataract    heart bypass      2004    HERNIA REPAIR      right    ROTATOR CUFF REPAIR      right  2004         FAMILY HISTORY:               "  Family History   Problem Relation Age of Onset    Arthritis Mother     Diabetes Mother     Stroke Mother     Heart attack Father     Cancer Brother     Throat cancer Brother     Diabetes Maternal Aunt     Diabetes Maternal Uncle     Heart disease Maternal Uncle     Diabetes Paternal Aunt     Heart disease Paternal Aunt     Heart disease Paternal Uncle     Heart disease Maternal Grandmother     Cancer Maternal Grandfather        SOCIAL HISTORY:          Tobacco:   Social History     Tobacco Use   Smoking Status Former Smoker    Packs/day: 3.00    Years: 45.00    Pack years: 135.00    Types: Cigarettes    Quit date: 2004    Years since quittin.2   Smokeless Tobacco Never Used       alcohol use:    Social History     Substance and Sexual Activity   Alcohol Use Yes    Comment: was heavy drinker 35 years ago, rare use now               Gas from sodas  Occupation:  Retired tug boat captain    ALLERGIES:    Review of patient's allergies indicates:   Allergen Reactions    Aricept odt [donepezil] Diarrhea and Nausea And Vomiting    Codeine Nausea Only     weak    Ranexa [ranolazine]        CURRENT MEDICATIONS:    Current Outpatient Medications   Medication Sig Dispense Refill    acetaminophen (TYLENOL) 325 MG tablet Take 2 tablets (650 mg total) by mouth every 6 (six) hours as needed. 13 tablet 0    albuterol (PROVENTIL/VENTOLIN HFA) 90 mcg/actuation inhaler INHALE 2 PUFFS BY MOUTH EVERY 6 HOURS AS NEEDED FOR WHEEZING OR  SHORTNESS  OF  BREATH 54 g 0    atorvastatin (LIPITOR) 40 MG tablet Take 40 mg by mouth once daily.      BD INSULIN PEN NEEDLE UF SHORT 31 gauge x 5/16" Ndle       BD INSULIN SYRINGE ULTRA-FINE 1/2 mL 31 gauge x 15/64" Syrg       blood sugar diagnostic Strp To check BG 3 times daily, to use with insurance preferred meter 200 strip 11    carvedilol (COREG) 25 MG tablet Take 1 tablet (25 mg total) by mouth 2 (two) times daily. 180 tablet 0    cinnamon bark 500 mg " "capsule Take 1,000 mg by mouth once daily.        clopidogreL (PLAVIX) 75 mg tablet Take 75 mg by mouth.      diclofenac sodium (VOLTAREN) 1 % Gel Apply 2 g topically 2 (two) times daily. 100 g 0    diclofenac sodium (VOLTAREN) 1 % Gel Apply 2 g topically 3 (three) times daily. 50 g 0    ENTRESTO  mg per tablet Take 1 tablet by mouth 2 (two) times daily.      fluticasone propionate (FLONASE) 50 mcg/actuation nasal spray 1 spray (50 mcg total) by Each Nostril route 2 (two) times daily. 18.2 mL 3    furosemide (LASIX) 40 MG tablet Take 40 mg by mouth once daily.      gabapentin (NEURONTIN) 300 MG capsule Take 4 capsules (1,200 mg total) by mouth 2 (two) times daily. (Patient taking differently: Take 900 mg by mouth 2 (two) times daily.) 540 capsule 1    glimepiride (AMARYL) 4 MG tablet Take 4 mg by mouth daily with breakfast.       HYDROcodone-acetaminophen (NORCO) 5-325 mg per tablet Take 1 tablet by mouth every 4 (four) hours as needed for Pain. Causes drowsiness. Do not drive or operate machinery with this medication. Do not drink alcohol with this medication. Causes constipation. Take with stool softener. 10 tablet 0    insulin NPH-insulin regular, 70/30, (NOVOLIN 70/30) 100 unit/mL (70-30) injection Inject into the skin. Inject 10 units at breakfast and inject 10 units at night      insulin syringe-needle U-100 0.3 mL 31 gauge x 15/64" Syrg USE TO INJECT INSULIN THREE TIMES DAILY      insulin syringe-needle U-100 1/2 mL 31 x 5/16" Syrg       isosorbide mononitrate (IMDUR) 30 MG 24 hr tablet Take 30 mg by mouth once daily.      lancets Misc To check BG 3 times daily, to use with insurance preferred meter 200 each 11    LIDOcaine (LIDODERM) 5 % Place 1 patch onto the skin once daily. Remove & Discard patch within 12 hours or as directed by MD Townsend patch 1    metformin (GLUCOPHAGE) 500 MG tablet Take 1,000 mg by mouth 2 (two) times daily with meals. 2 Tablets in the morning, 2 Tablets in the " evening      metoclopramide HCl (REGLAN) 5 MG tablet Take 5 mg by mouth 3 (three) times daily as needed.      nitroGLYCERIN (NITROSTAT) 0.4 MG SL tablet DISSOLVE 1 TABLET UNDER THE TONGUE EVERY 5 MINUTES AS  NEEDED FOR CHEST PAIN. MAX  OF 3 TABLETS IN 15 MINUTES. CALL 911 IF PAIN PERSISTS.      pravastatin (PRAVACHOL) 20 MG tablet Take 1 tablet (20 mg total) by mouth once daily. 90 tablet 1    spironolactone (ALDACTONE) 25 MG tablet Take 25 mg by mouth.      VIT C/VIT E AC/LUT/COPPER/ZINC (PRESERVISION LUTEIN ORAL) Take by mouth.      warfarin (COUMADIN) 5 MG tablet Take 2 tabs by mouth on Tuesday,Thursday, Saturday and Sundays then 1.5 on all other days.      blood-glucose meter kit To check BG 3 times daily, to use with insurance preferred meter 1 each 0    diazePAM (VALIUM) 2 MG tablet Take 1 tablet (2 mg total) by mouth every 12 (twelve) hours as needed for Anxiety (Dizziness). 20 tablet 0    donepeziL (ARICEPT) 10 MG tablet Take 1 tablet (10 mg total) by mouth every evening. Take one half tablet for one week then as prescribed. 30 tablet 11    DULoxetine (CYMBALTA) 60 MG capsule Take 1 capsule (60 mg total) by mouth once daily. 90 capsule 3     No current facility-administered medications for this visit.                  REVIEW OF SYSTEMS:   Salt worsen sob  Review of Systems   Constitutional: Negative for fever, chills, weight loss, weight gain, activity change, appetite change, fatigue and night sweats.   HENT: Negative for congestion.    Eyes: Negative.    Respiratory: Positive for wheezing (afte exertion sometimes), dyspnea on extertion (improve with salt reduction), use of rescue inhaler (albuterol prmn exertion, no maintenance, too expenisive and couldn't tell if helpful) and somnolence (after eating lunch). Negative for apnea, snoring, cough, sputum production, chest tightness, orthopnea and previous hospitialization due to pulmonary problems.         Wearing cpap every time  All activity daily  "living   Cardiovascular: Positive for leg swelling (imrpve without salt). Negative for chest pain and palpitations.   Genitourinary: Negative.    Endocrine: endocrine negative   Musculoskeletal: Positive for arthralgias (right hip OA, right and left knee-sees orthopedic) and gait problem.   Skin: Negative.    Gastrointestinal: Negative for acid reflux.   Neurological:        Off balance due to vertigo, rollator walker, seeing neyrology   Psychiatric/Behavioral: Negative for sleep disturbance.        Slepping through night  Nap in evening 2-5 alarm set , sometimes go to bed 2 am- oob 6 am, rested on cpap, sometimes cat wake pt up          PHYSICAL EXAM:  Vitals:    06/17/22 0851   BP: 135/73   Pulse: 70   SpO2: (!) 94%   Weight: 82.3 kg (181 lb 8.8 oz)   Height: 5' 8" (1.727 m)   PainSc: 10-Worst pain ever   PainLoc: Hip     Body mass index is 27.6 kg/m².     Physical Exam   Constitutional: He is oriented to person, place, and time. He appears well-developed. No distress. He is obese.   HENT:   Head: Normocephalic.   Cardiovascular: Normal rate.   Murmur heard.  Ectopic beat   Pulmonary/Chest: Normal expansion, effort normal and breath sounds normal.   Musculoskeletal:         General: Edema (trace) present.      Cervical back: Neck supple.   Neurological: He is alert and oriented to person, place, and time.   walker   Skin: Skin is warm. He is not diaphoretic.   Psychiatric: He has a normal mood and affect. His behavior is normal. Judgment and thought content normal.         LABS  Pulmonary Functions Testing Results:  1/31/13 tlc 93%; dlco 46%  4/8/14 ratio 67%; fvc 76%; fve 73%  6/14/19 ratio of 48%; fvc 85%; fev1 55%  5/15/20 ratio of 54%; fev1 57%; fvc 80%; tlc 77%; dlco 34%  6 min 5/15/20 165 96-89-91 on room     ABG: none  CXR:   Lungs clear  CT CHEST:  2/15/19 rll ggo and tib in lingula.    5/4/19 (personally reviewed) increased reticulation at bases; similar to prior ct 2/15/19; +emphysematous changes.  "     PPD none          Split night study from  3/21/2017: AHI 31.7, Effective control acieved with cpap 14 cm H2O  CPAP titration study 8/17/2019:  Effective control of sleep disordered breathing was achieved with BPAP at 16/10 cm of water.     2-d echo 12/30/11  1.  The right atrium and right ventricle are of normal dimension.            2.  Tricuspid regurgitation is mild.                                         3.  PA pressure 28-30 mmHg.                                                  4.  Enlargement of the left atrium.                                          5.  Normal mitral valve structure and valve excursion.  No mitral            stenosis.                                                                     No MR detected from this study.                                             6.  Normal left ventricular dimensions and systolic function, ejection       fraction 50%-55%.                                                            7.  Grade I left ventricular diastolic dysfunction.                          8.  Mild aortic sclerosis with good valve excursion.  No aortic stenosis.    Minimal aortic regurgitation.                                                9.  No pericardial effusion seen.      bnp 8/5/10 134;1/24/13 164    6 min walk 350 meters; no significant desaturation    ASSESSMENT  Problem List Items Addressed This Visit        Unprioritized    COPD with chronic bronchitis and emphysema - Primary    Overview     fev1 1.60 (57%) 5/20. Patient also refuse oxygen. No obvious evidence at this time for need oxygen.  Quit smoking since 1960s.  Continue with trelegy and ventolin to maintain lung function.  Patient is up to date with vaccination.             ILD (interstitial lung disease)    Overview     Emphysematous changes on ct along with basilar reticulation.  pft with moderate obstructive physiology and mild restrictive physiology.    Monitor lung function           Relevant Orders    CT Chest  Without Contrast (Completed)    Obstructive sleep apnea treated with BiPAP    Overview     ahi of 31.7.  Patient is using and benefiting from bipap. Residual predicted AHI optimal.            Relevant Orders    CPAP/BIPAP SUPPLIES    Pulmonary nodule    Overview     0.5 mm 5/2020, serial monitor                Goals of care - patient does not wish for intubation or cpr in the case of cardiopulmonary arrest.  Living will in [facility].  Daughter is poa.      Patient will Follow up in about 1 year (around 6/17/2023), or if symptoms worsen or fail to improve.     Addendum: CT with progression fibrosis opacities pt to repeat imaging and pulmonary studies and schedule for follow up in 2 months.

## 2022-06-21 ENCOUNTER — HOSPITAL ENCOUNTER (OUTPATIENT)
Dept: RADIOLOGY | Facility: HOSPITAL | Age: 82
Discharge: HOME | End: 2022-06-21
Attending: NURSE PRACTITIONER
Payer: MEDICARE

## 2022-06-21 DIAGNOSIS — J84.9 ILD (INTERSTITIAL LUNG DISEASE): ICD-10-CM

## 2022-06-21 PROCEDURE — 71250 CT THORAX DX C-: CPT | Mod: TC

## 2022-06-21 PROCEDURE — 71250 CT THORAX DX C-: CPT | Mod: 26,,, | Performed by: RADIOLOGY

## 2022-06-21 PROCEDURE — 71250 CT CHEST WITHOUT CONTRAST: ICD-10-PCS | Mod: 26,,, | Performed by: RADIOLOGY

## 2022-06-22 ENCOUNTER — TELEPHONE (OUTPATIENT)
Dept: FAMILY MEDICINE | Facility: CLINIC | Age: 82
End: 2022-06-22
Payer: MEDICARE

## 2022-06-22 ENCOUNTER — TELEPHONE (OUTPATIENT)
Dept: PULMONOLOGY | Facility: CLINIC | Age: 82
End: 2022-06-22
Payer: MEDICARE

## 2022-06-22 NOTE — TELEPHONE ENCOUNTER
Faxed orders to [facility].                ----- Message from Kasie Edmondson MD sent at 6/13/2022  8:42 PM CDT -----  Please send this to his lung/sleep doctor.  ----- Message -----  From: Kate Brandt  Sent: 6/10/2022  12:32 PM CDT  To: Delvis JACOB Staff     Type: Patient Call Back     Who called: self      What is the request in detail: pt is asking for a an order to [facility] for cushion for his c-pap machine      Can the clinic reply by [facility]?     Would the patient rather a call back or a response via [facility]?      Best call back number: [phone] (home)

## 2022-06-22 NOTE — TELEPHONE ENCOUNTER
----- Message from Kasie Edmondson MD sent at 6/13/2022  8:42 PM CDT -----  Please send this to his lung/sleep doctor.  ----- Message -----  From: Kate Brandt  Sent: 6/10/2022  12:32 PM CDT  To: Delvis JACOB Staff    Type: Patient Call Back    Who called: self     What is the request in detail: pt is [facility] for his c-pap machine     Can the clinic reply by [facility]?    Would the patient rather a call back or a response via [facility]?     Best call back number: [phone] (home)

## 2022-06-23 NOTE — TELEPHONE ENCOUNTER
I respectfully disagree that I do not think it is medication related. It is likely due to dry skin - would recommend he apply eucerin plus cream daily.  Recommend office visit if symptoms worsen or persist.

## 2022-06-23 NOTE — TELEPHONE ENCOUNTER
Spoke with patient he states that he spoke with the PHN nurse and was informed that some of the medications that he is on has a s/e of itching. Patient states that for about a year now he has noticed some itching between his elbow and hand on both arms. At first he thought it was his cats but now he knows it is the medication, he notes that this is a everyday thing and he does use vagasil and Cortizone 10 cream. He states that the cream does help a little bit but wants to know is it something he can use ? Patient also notes that some times he wakes up in the middle of the night itching.

## 2022-06-23 NOTE — TELEPHONE ENCOUNTER
----- Message from Rosie Su sent at 6/23/2022 12:59 PM CDT -----  Type: Patient Call Back    Who called: self     What is the request in detail: Patient states he spoke with a nurse recently, and was told that some  of the medications he is taking can make him itchy. Patient states he is itchy only on his forearms and would like to know if anything can be prescribed to help with this. Would like to speak with  someone about this issue.     Can the clinic reply by [facility]? No     Would the patient rather a call back or a response via [facility]? Call back     Best call back number: [phone]

## 2022-06-27 ENCOUNTER — TELEPHONE (OUTPATIENT)
Dept: PULMONOLOGY | Facility: CLINIC | Age: 82
End: 2022-06-27
Payer: MEDICARE

## 2022-06-27 NOTE — TELEPHONE ENCOUNTER
Message sent to Gloria Stinson NP.      TERRY Juares  Pulm/Sleep [facility]  [phone]                  ----- Message from Mick Sky sent at 6/27/2022 10:01 AM CDT -----  Name of Who is Calling: JAILENE JIMENES          What is the request in detail: The patient is calling to speak to the nurse in regards to his results. Please advise          Can the clinic reply by [facility]: No         What Number to Call Back if not in [facility]: [phone]

## 2022-06-28 ENCOUNTER — TELEPHONE (OUTPATIENT)
Dept: PULMONOLOGY | Facility: CLINIC | Age: 82
End: 2022-06-28
Payer: MEDICARE

## 2022-06-28 DIAGNOSIS — J43.9 COPD WITH CHRONIC BRONCHITIS AND EMPHYSEMA: ICD-10-CM

## 2022-06-28 DIAGNOSIS — J44.89 COPD WITH CHRONIC BRONCHITIS AND EMPHYSEMA: ICD-10-CM

## 2022-06-28 DIAGNOSIS — J18.1 CONSOLIDATION OF LEFT LOWER LOBE OF LUNG: Primary | ICD-10-CM

## 2022-06-28 DIAGNOSIS — J84.9 ILD (INTERSTITIAL LUNG DISEASE): ICD-10-CM

## 2022-06-28 RX ORDER — DOXYCYCLINE 100 MG/1
100 CAPSULE ORAL EVERY 12 HOURS
Qty: 20 CAPSULE | Refills: 0 | Status: SHIPPED | OUTPATIENT
Start: 2022-06-28 | End: 2022-07-08

## 2022-06-28 NOTE — TELEPHONE ENCOUNTER
New left lower lung consolidation, trial doxy and reimage in 8 weeks, if persist, consider pet, biopsy. Pt not taking trelegy due to cost of medication, recommend pt return to  [employer] patient assistance application. Pt states will do this tomorrow.

## 2022-06-28 NOTE — TELEPHONE ENCOUNTER
----- Message from Mor Li MA sent at 6/27/2022 10:04 AM CDT -----    Name of Who is Calling: JAILENE JIMENES         The patient is regards to his CT results.      [phone]

## 2022-06-29 NOTE — TELEPHONE ENCOUNTER
Scheduled an appointment to have CT, PFT and see provider. Mailed appt slips.    TERRY Juares          ----- Message from Mor Li MA sent at 6/29/2022  7:32 AM CDT -----  Hi Please schedule pt for repeat CT chest and PFT around 8/26/2022 and a follow up visit after the test to go over this. Thank you Gloria Rowe comment     Gloria Stinson NP 13 hours ago (5:58 PM)             New left lower lung consolidation, trial doxy and reimage in 8 weeks, if persist, consider pet, biopsy. Pt not taking trelegy due to cost of medication, recommend pt return to  [employer] patient assistance application. Pt states will do this tomorrow.

## 2022-07-11 ENCOUNTER — OFFICE VISIT (OUTPATIENT)
Dept: NEUROLOGY | Facility: CLINIC | Age: 82
End: 2022-07-11
Payer: MEDICARE

## 2022-07-11 VITALS
WEIGHT: 182.31 LBS | DIASTOLIC BLOOD PRESSURE: 57 MMHG | SYSTOLIC BLOOD PRESSURE: 114 MMHG | BODY MASS INDEX: 27.63 KG/M2 | HEIGHT: 68 IN | HEART RATE: 68 BPM

## 2022-07-11 DIAGNOSIS — R42 DIZZINESS AND GIDDINESS: Primary | ICD-10-CM

## 2022-07-11 DIAGNOSIS — R42 SYNCOPAL VERTIGO: ICD-10-CM

## 2022-07-11 DIAGNOSIS — R55 SYNCOPAL VERTIGO: ICD-10-CM

## 2022-07-11 DIAGNOSIS — R41.3 MEMORY LOSS: ICD-10-CM

## 2022-07-11 PROCEDURE — 3078F DIAST BP <80 MM HG: CPT | Mod: CPTII,S$GLB,, | Performed by: NEUROLOGICAL SURGERY

## 2022-07-11 PROCEDURE — 99214 OFFICE O/P EST MOD 30 MIN: CPT | Mod: S$GLB,,, | Performed by: NEUROLOGICAL SURGERY

## 2022-07-11 PROCEDURE — 1101F PT FALLS ASSESS-DOCD LE1/YR: CPT | Mod: CPTII,S$GLB,, | Performed by: NEUROLOGICAL SURGERY

## 2022-07-11 PROCEDURE — 1101F PR PT FALLS ASSESS DOC 0-1 FALLS W/OUT INJ PAST YR: ICD-10-PCS | Mod: CPTII,S$GLB,, | Performed by: NEUROLOGICAL SURGERY

## 2022-07-11 PROCEDURE — 1125F PR PAIN SEVERITY QUANTIFIED, PAIN PRESENT: ICD-10-PCS | Mod: CPTII,S$GLB,, | Performed by: NEUROLOGICAL SURGERY

## 2022-07-11 PROCEDURE — 1157F ADVNC CARE PLAN IN RCRD: CPT | Mod: CPTII,S$GLB,, | Performed by: NEUROLOGICAL SURGERY

## 2022-07-11 PROCEDURE — 3288F PR FALLS RISK ASSESSMENT DOCUMENTED: ICD-10-PCS | Mod: CPTII,S$GLB,, | Performed by: NEUROLOGICAL SURGERY

## 2022-07-11 PROCEDURE — 1159F PR MEDICATION LIST DOCUMENTED IN MEDICAL RECORD: ICD-10-PCS | Mod: CPTII,S$GLB,, | Performed by: NEUROLOGICAL SURGERY

## 2022-07-11 PROCEDURE — 3074F PR MOST RECENT SYSTOLIC BLOOD PRESSURE < 130 MM HG: ICD-10-PCS | Mod: CPTII,S$GLB,, | Performed by: NEUROLOGICAL SURGERY

## 2022-07-11 PROCEDURE — 99999 PR PBB SHADOW E&M-EST. PATIENT-LVL V: CPT | Mod: PBBFAC,,, | Performed by: NEUROLOGICAL SURGERY

## 2022-07-11 PROCEDURE — 1159F MED LIST DOCD IN RCRD: CPT | Mod: CPTII,S$GLB,, | Performed by: NEUROLOGICAL SURGERY

## 2022-07-11 PROCEDURE — 3074F SYST BP LT 130 MM HG: CPT | Mod: CPTII,S$GLB,, | Performed by: NEUROLOGICAL SURGERY

## 2022-07-11 PROCEDURE — 99214 PR OFFICE/OUTPT VISIT, EST, LEVL IV, 30-39 MIN: ICD-10-PCS | Mod: S$GLB,,, | Performed by: NEUROLOGICAL SURGERY

## 2022-07-11 PROCEDURE — 3078F PR MOST RECENT DIASTOLIC BLOOD PRESSURE < 80 MM HG: ICD-10-PCS | Mod: CPTII,S$GLB,, | Performed by: NEUROLOGICAL SURGERY

## 2022-07-11 PROCEDURE — 99999 PR PBB SHADOW E&M-EST. PATIENT-LVL V: ICD-10-PCS | Mod: PBBFAC,,, | Performed by: NEUROLOGICAL SURGERY

## 2022-07-11 PROCEDURE — 1125F AMNT PAIN NOTED PAIN PRSNT: CPT | Mod: CPTII,S$GLB,, | Performed by: NEUROLOGICAL SURGERY

## 2022-07-11 PROCEDURE — 3288F FALL RISK ASSESSMENT DOCD: CPT | Mod: CPTII,S$GLB,, | Performed by: NEUROLOGICAL SURGERY

## 2022-07-11 PROCEDURE — 1157F PR ADVANCE CARE PLAN OR EQUIV PRESENT IN MEDICAL RECORD: ICD-10-PCS | Mod: CPTII,S$GLB,, | Performed by: NEUROLOGICAL SURGERY

## 2022-07-11 RX ORDER — DONEPEZIL HYDROCHLORIDE 10 MG/1
10 TABLET, FILM COATED ORAL NIGHTLY
Qty: 30 TABLET | Refills: 11 | Status: SHIPPED | OUTPATIENT
Start: 2022-07-11 | End: 2023-03-17

## 2022-07-11 NOTE — PROGRESS NOTES
Chief Complaint   Patient presents with    Headache        Percy Ramirez is a 81 y.o. male with a history of multiple medical diagnoses as listed below that presents for evaluation of memory loss.  He says he has been having short-term memory loss and has been having more difficulty with functioning around the home.  He says that he has been making some errors on occasion with his medications a seems to be more forgetful when it comes to functioning around the home.  He does not feel that there has been any impact on him being able to care for himself .  He says that one of his biggest problems has been remembering names, but other smaller things have been bothersome as well such as forgetting what he felt into a room to get or forgetting a task that he wanted to do around the home.    Interval History  06/18/2018  He has been doing about the same overall since he was last seen in clinic.  He has been bothered by a scratch on the left hand that he cannot get to stop bleeding.  He said that he noticed a scratch on Saturday and this by bending area he has continued to have some oozing of blood there.  He has not had any other problems in the time since he was last seen.    02/18/2019  Patient presents for routine follow-up.  He says that his memory continues to be a source of concern for him.  He has not had any significant change in his memory status. The patient presents alone to clinic today.  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  He says that since he is retired due to his age, ailing health, and failing vision he has been sitting around the home watching television most of the day.  He admits that he does not engage in much physical or mental activity.   He has been taking Aricept as directed without any complaints of any side effects.    02/10/2020  Despite the concerns expressed at his last visit he has been most bothered by burning, itching, and tingling sensations along the foot, pain is most prominent from the toes to the level of the ankle on both feet.  He says that he cannot say exactly when the symptoms began but they seem to be more intense and more intrusive on his life than it had been in the past.  He has been taking gabapentin as directed but feels that the medication offers little to no benefit in pain control.    04/08/2022  He continues to have difficulty with his memory but his main concern has been dizziness and pain in his head.  He says he has a sharp pain and an aching pain that tends to occur across the middle of his head.  The symptoms are more present toward the later parts of the day and seemed to be unresponsive to any medication he has used thus far.  Pain quality is usually a constant aching intensity that builds over the course of the day.  He has not had any weakness.  Occasionally pain will radiate to the back of the head but he has not had any pain in his neck or his shoulders.  He denies any light or sound sensitivity.  Pain is usually moderate in intensity lasting for several hours at a time.  He also continues to have episodes of vertigo which has been a chronic problem.  He has tried meclizine and other antiemetic medication but has not found any significant improvement.  Patient has used Valium in the past which was effective whenever the bowels were uncontrollable.    07/11/2022  He continues to have dizziness and he continues to have problems with his memory as well. In the past dizziness has been less often than recently. Medications have been well tolerated. He is worried about continued progression of his memory loss.    PAST MEDICAL HISTORY:  Past Medical History:   Diagnosis Date    Allergy     Arthritis     CAD,  multiple vessel     DR Melissa    Cataract     Depression     History of colon polyps     Hyperlipidemia     Hypertension     Macular degeneration     Dr Razo for injection, Jennifer for eye    S/P CABG x 2     Type 2 diabetes mellitus with circulatory disorder     Dr. Aquino       PAST SURGICAL HISTORY:  Past Surgical History:   Procedure Laterality Date    broken elbow      left    COLONOSCOPY N/A 10/4/2017    Procedure: COLONOSCOPY;  Surgeon: Gabriel Leung MD;  Location: [facility];  Service: Endoscopy;  Laterality: N/A;    EYE SURGERY      cataract    heart bypass      2004    HERNIA REPAIR      right    ROTATOR CUFF REPAIR      right  2004       SOCIAL HISTORY:  Social History     Socioeconomic History    Marital status:     Number of children: 4    Years of education: GED   Occupational History    Occupation: retired tug    Tobacco Use    Smoking status: Former Smoker     Packs/day: 3.00     Years: 45.00     Pack years: 135.00     Types: Cigarettes     Quit date: 2004     Years since quittin.2    Smokeless tobacco: Never Used   Substance and Sexual Activity    Alcohol use: Yes     Comment: was heavy drinker 35 years ago, rare use now    Drug use: No    Sexual activity: Yes     Partners: Female       FAMILY HISTORY:  Family History   Problem Relation Age of Onset    Arthritis Mother     Diabetes Mother     Stroke Mother     Heart attack Father     Cancer Brother     Throat cancer Brother     Diabetes Maternal Aunt     Diabetes Maternal Uncle     Heart disease Maternal Uncle     Diabetes Paternal Aunt     Heart disease Paternal Aunt     Heart disease Paternal Uncle     Heart disease Maternal Grandmother     Cancer Maternal Grandfather        ALLERGIES AND MEDICATIONS: updated and reviewed.  Review of patient's allergies indicates:   Allergen Reactions    Codeine Nausea Only     weak    Ranexa [ranolazine]      Current Outpatient Medications  "  Medication Sig Dispense Refill    acetaminophen (TYLENOL) 325 MG tablet Take 2 tablets (650 mg total) by mouth every 6 (six) hours as needed. 13 tablet 0    albuterol (PROVENTIL/VENTOLIN HFA) 90 mcg/actuation inhaler INHALE 2 PUFFS BY MOUTH EVERY 6 HOURS AS NEEDED FOR WHEEZING OR  SHORTNESS  OF  BREATH 54 g 0    atorvastatin (LIPITOR) 40 MG tablet Take 40 mg by mouth once daily.      BD INSULIN PEN NEEDLE UF SHORT 31 gauge x 5/16" Ndle       BD INSULIN SYRINGE ULTRA-FINE 1/2 mL 31 gauge x 15/64" Syrg       blood sugar diagnostic Strp To check BG 3 times daily, to use with insurance preferred meter 200 strip 11    blood-glucose meter kit To check BG 3 times daily, to use with insurance preferred meter 1 each 0    carvedilol (COREG) 25 MG tablet Take 1 tablet (25 mg total) by mouth 2 (two) times daily. 180 tablet 0    cinnamon bark 500 mg capsule Take 1,000 mg by mouth once daily.        clopidogreL (PLAVIX) 75 mg tablet Take 75 mg by mouth.      diazePAM (VALIUM) 2 MG tablet Take 1 tablet (2 mg total) by mouth every 12 (twelve) hours as needed for Anxiety (Dizziness). 20 tablet 0    diclofenac sodium (VOLTAREN) 1 % Gel Apply 2 g topically 2 (two) times daily. 100 g 0    diclofenac sodium (VOLTAREN) 1 % Gel Apply 2 g topically 3 (three) times daily. 50 g 0    donepeziL (ARICEPT) 10 MG tablet Take 1 tablet (10 mg total) by mouth every evening. Take one half tablet for one week then as prescribed. 30 tablet 11    DULoxetine (CYMBALTA) 60 MG capsule Take 1 capsule (60 mg total) by mouth once daily. 90 capsule 3    ENTRESTO  mg per tablet Take 1 tablet by mouth 2 (two) times daily.      fluticasone propionate (FLONASE) 50 mcg/actuation nasal spray 1 spray (50 mcg total) by Each Nostril route 2 (two) times daily. 18.2 mL 3    furosemide (LASIX) 40 MG tablet Take 40 mg by mouth once daily.      gabapentin (NEURONTIN) 300 MG capsule Take 4 capsules (1,200 mg total) by mouth 2 (two) times daily. " "(Patient taking differently: Take 900 mg by mouth 2 (two) times daily.) 540 capsule 1    glimepiride (AMARYL) 4 MG tablet Take 4 mg by mouth daily with breakfast.       HYDROcodone-acetaminophen (NORCO) 5-325 mg per tablet Take 1 tablet by mouth every 4 (four) hours as needed for Pain. Causes drowsiness. Do not drive or operate machinery with this medication. Do not drink alcohol with this medication. Causes constipation. Take with stool softener. 10 tablet 0    insulin NPH-insulin regular, 70/30, (NOVOLIN 70/30) 100 unit/mL (70-30) injection Inject into the skin. Inject 10 units at breakfast and inject 10 units at night      insulin syringe-needle U-100 0.3 mL 31 gauge x 15/64" Syrg USE TO INJECT INSULIN THREE TIMES DAILY      insulin syringe-needle U-100 1/2 mL 31 x 5/16" Syrg       isosorbide mononitrate (IMDUR) 30 MG 24 hr tablet Take 30 mg by mouth once daily.      lancets Misc To check BG 3 times daily, to use with insurance preferred meter 200 each 11    LIDOcaine (LIDODERM) 5 % Place 1 patch onto the skin once daily. Remove & Discard patch within 12 hours or as directed by MD 5 patch 1    metformin (GLUCOPHAGE) 500 MG tablet Take 1,000 mg by mouth 2 (two) times daily with meals. 2 Tablets in the morning, 2 Tablets in the evening      metoclopramide HCl (REGLAN) 5 MG tablet Take 5 mg by mouth 3 (three) times daily as needed.      nitroGLYCERIN (NITROSTAT) 0.4 MG SL tablet DISSOLVE 1 TABLET UNDER THE TONGUE EVERY 5 MINUTES AS  NEEDED FOR CHEST PAIN. MAX  OF 3 TABLETS IN 15 MINUTES. CALL 911 IF PAIN PERSISTS.      pravastatin (PRAVACHOL) 20 MG tablet Take 1 tablet (20 mg total) by mouth once daily. 90 tablet 1    spironolactone (ALDACTONE) 25 MG tablet Take 25 mg by mouth.      VIT C/VIT E AC/LUT/COPPER/ZINC (PRESERVISION LUTEIN ORAL) Take by mouth.      warfarin (COUMADIN) 5 MG tablet Take 2 tabs by mouth on Tuesday,Thursday, Saturday and Sundays then 1.5 on all other days.       No current " facility-administered medications for this visit.       Review of Systems   Constitutional: Negative for activity change, fatigue and unexpected weight change.   HENT: Negative for trouble swallowing and voice change.    Eyes: Negative for photophobia, pain and visual disturbance.   Respiratory: Negative for apnea and shortness of breath.    Cardiovascular: Negative for chest pain and palpitations.   Gastrointestinal: Negative for constipation, nausea and vomiting.   Genitourinary: Negative for difficulty urinating.   Musculoskeletal: Negative for arthralgias, back pain, gait problem, myalgias and neck pain.   Skin: Negative for color change and rash.   Neurological: Negative for dizziness, seizures, syncope, speech difficulty, weakness, light-headedness, numbness and headaches.   Psychiatric/Behavioral: Positive for confusion and decreased concentration. Negative for agitation and behavioral problems.       Neurologic Exam     Mental Status   Oriented to person, place, and time.   Oriented to person.   Oriented to city.   Oriented to year, month, date and day.   Registration of memory: 5/5. Recall of objects at 5 minutes: 3/5.   Attention: normal. Concentration: normal.   Speech: speech is normal   Level of consciousness: alert  Knowledge: good.   Able to name object. Unable to repeat.     Cranial Nerves     CN II   Visual fields full to confrontation.   Right visual field deficit: none  Left visual field deficit: none     CN III, IV, VI   Pupils are equal, round, and reactive to light.  Extraocular motions are normal.   Right pupil: Size: 3 mm. Shape: regular. Accommodation: intact.   Left pupil: Size: 3 mm. Shape: regular. Accommodation: intact.   CN III: no CN III palsy  CN VI: no CN VI palsy  Nystagmus: none   Diplopia: none  Ophthalmoparesis: none  Upgaze: normal  Downgaze: normal  Conjugate gaze: present    CN V   Facial sensation intact.   Right facial sensation deficit: none  Left facial sensation deficit:  none    CN VII   Facial expression full, symmetric.   Right facial weakness: none  Left facial weakness: none    CN VIII   CN VIII normal.     CN IX, X   CN IX normal.   CN X normal.   Palate: symmetric    CN XI   CN XI normal.   Right sternocleidomastoid strength: normal  Left sternocleidomastoid strength: normal  Right trapezius strength: normal  Left trapezius strength: normal    CN XII   CN XII normal.   Tongue deviation: none    Motor Exam   Muscle bulk: normal  Overall muscle tone: normal  Right arm tone: normal  Left arm tone: normal  Right leg tone: normal  Left leg tone: normal    Strength   Strength 5/5 throughout.     Sensory Exam   Right arm light touch: normal  Left arm light touch: normal  Right leg light touch: normal  Left leg light touch: normal  Right arm vibration: normal  Left arm vibration: normal  Right leg vibration: normal  Left leg vibration: normal  Right arm proprioception: normal  Left arm proprioception: normal  Right leg proprioception: normal  Left leg proprioception: normal  Right arm pinprick: normal  Left arm pinprick: normal  Right leg pinprick: normal  Left leg pinprick: normal    Gait, Coordination, and Reflexes     Gait  Gait: normal    Coordination   Romberg: negative  Finger to nose coordination: normal  Heel to shin coordination: normal  Tandem walking coordination: normal    Tremor   Resting tremor: absent    Reflexes   Right brachioradialis: 2+  Left brachioradialis: 2+  Right biceps: 2+  Left biceps: 2+  Right triceps: 2+  Left triceps: 2+  Right patellar: 2+  Left patellar: 2+  Right achilles: 2+  Left achilles: 2+  Right plantar: normal  Left plantar: normal      Physical Exam   Constitutional: He is oriented to person, place, and time. He appears well-developed and well-nourished.   HENT:   Head: Normocephalic and atraumatic.   Eyes: Pupils are equal, round, and reactive to light. EOM are normal.   Cardiovascular: Intact distal pulses.   Pulmonary/Chest: Effort normal.  "No respiratory distress.   Musculoskeletal: Normal range of motion.   Neurological: He is alert and oriented to person, place, and time. He has normal strength. He has a normal Finger-Nose-Finger Test, a normal Heel to Shin Test, a normal Romberg Test and a normal Tandem Gait Test. Gait normal.   Reflex Scores:       Tricep reflexes are 2+ on the right side and 2+ on the left side.       Bicep reflexes are 2+ on the right side and 2+ on the left side.       Brachioradialis reflexes are 2+ on the right side and 2+ on the left side.       Patellar reflexes are 2+ on the right side and 2+ on the left side.       Achilles reflexes are 2+ on the right side and 2+ on the left side.  Skin: Skin is warm and dry.   Psychiatric: He has a normal mood and affect. His speech is normal and behavior is normal.       Vitals:    07/11/22 0909   BP: (!) 114/57   Pulse: 68   Weight: 82.7 kg (182 lb 5.1 oz)   Height: 5' 8" (1.727 m)     MOCA 18/25 (visual-spatial portions of the examination were refused, as the patient has poor vision)  MRI brain personally reviewed: Chronic microvascular ischemic changes noted.    Assessment & Plan:    Problem List Items Addressed This Visit    None     Visit Diagnoses     Dizziness and giddiness    -  Primary    Relevant Orders    CT Head Without Contrast (Completed)    US Carotid Bilateral (Completed)    Syncopal vertigo         Relevant Orders    US Carotid Bilateral (Completed)    Memory loss        Relevant Medications    donepeziL (ARICEPT) 10 MG tablet        Follow-up: No follow-ups on file.            "

## 2022-07-14 ENCOUNTER — TELEPHONE (OUTPATIENT)
Dept: PULMONOLOGY | Facility: CLINIC | Age: 82
End: 2022-07-14
Payer: MEDICARE

## 2022-07-14 NOTE — TELEPHONE ENCOUNTER
Spoke with patient told him to mail paperwork to get Trelegy.                          ----- Message from Carolynn Narvaez MA sent at 7/11/2022  4:57 PM CDT -----  Amy Castro Staff  Caller: Unspecified (Today,  4:46 PM)  Type: Patient Call Back     Who calledpt     What is the request in detail:pt requesting to discuss for for trilegy. Call pt to discuss     Can the clinic reply by [facility]?     Would the patient rather a call back or a response via [facility]? call     Best call back number:[phone] (home)       Additional Information:

## 2022-07-15 ENCOUNTER — TELEPHONE (OUTPATIENT)
Dept: FAMILY MEDICINE | Facility: CLINIC | Age: 82
End: 2022-07-15
Payer: MEDICARE

## 2022-07-15 NOTE — TELEPHONE ENCOUNTER
Spoke with patient he is asking if he had chicken pox in the past would that affect monkey pox, will it cause him to have monkey pox ? Patient is asking does he need a vaccine for monkey pox ?

## 2022-07-15 NOTE — TELEPHONE ENCOUNTER
----- Message from Kate Brandt sent at 7/15/2022  8:49 AM CDT -----  Type: Patient Call Back    Who called: self     What is the request in detail: If pt had chicken pox when he was younger will that effect monkey pox in any way. Does he need to get another shot, Please advise     Can the clinic reply by [facility]?    Would the patient rather a call back or a response via [facility]? Call     Best call back number: [phone] (home)

## 2022-07-15 NOTE — TELEPHONE ENCOUNTER
Please advise patient that having had chickenpox in the past likely will not give protection to monkey pox now. He is not at high risk for exposure to monkey pox at this time so no vaccination is recommended for him at this time.

## 2022-07-19 ENCOUNTER — HOSPITAL ENCOUNTER (OUTPATIENT)
Dept: RADIOLOGY | Facility: HOSPITAL | Age: 82
Discharge: HOME | End: 2022-07-19
Attending: NEUROLOGICAL SURGERY
Payer: MEDICARE

## 2022-07-19 DIAGNOSIS — R55 SYNCOPAL VERTIGO: ICD-10-CM

## 2022-07-19 DIAGNOSIS — R42 SYNCOPAL VERTIGO: ICD-10-CM

## 2022-07-19 DIAGNOSIS — R42 DIZZINESS AND GIDDINESS: ICD-10-CM

## 2022-07-19 PROCEDURE — 93880 US CAROTID BILATERAL: ICD-10-PCS | Mod: 26,,, | Performed by: RADIOLOGY

## 2022-07-19 PROCEDURE — 93880 EXTRACRANIAL BILAT STUDY: CPT | Mod: 26,,, | Performed by: RADIOLOGY

## 2022-07-19 PROCEDURE — 70450 CT HEAD WITHOUT CONTRAST: ICD-10-PCS | Mod: 26,,, | Performed by: RADIOLOGY

## 2022-07-19 PROCEDURE — 70450 CT HEAD/BRAIN W/O DYE: CPT | Mod: TC

## 2022-07-19 PROCEDURE — 70450 CT HEAD/BRAIN W/O DYE: CPT | Mod: 26,,, | Performed by: RADIOLOGY

## 2022-07-19 PROCEDURE — 93880 EXTRACRANIAL BILAT STUDY: CPT | Mod: TC

## 2022-07-22 ENCOUNTER — TELEPHONE (OUTPATIENT)
Dept: NEUROLOGY | Facility: CLINIC | Age: 82
End: 2022-07-22
Payer: MEDICARE

## 2022-07-22 NOTE — TELEPHONE ENCOUNTER
Called and informed patient that he can go back to taking one half of the Sricept medication.  Patient verbalized understanding.

## 2022-07-22 NOTE — TELEPHONE ENCOUNTER
----- Message from Telly Hopeey sent at 7/22/2022 10:13 AM CDT -----  .Type: Patient Call Back    Who called:self    What is the request in detail:was prescribed donepeziL (ARICEPT) 10 MG tablet a while back and was tking half a tab everyday and it made him feel comfortable. He took a half for about 9-10 days. Last night he took a whole tablet and the side effects were really bad he said nausea, vomiting, diarrhea etc. And was wondering if he can keep taking a half of tablet instead of whole.       Can the clinic reply by [facility]?no    Would the patient rather a call back or a response via [facility]? call    Best call back number:.[phone] (home)

## 2022-07-28 ENCOUNTER — TELEPHONE (OUTPATIENT)
Dept: NEUROLOGY | Facility: CLINIC | Age: 82
End: 2022-07-28
Payer: MEDICARE

## 2022-07-28 NOTE — TELEPHONE ENCOUNTER
----- Message from Kate Brandt sent at 7/28/2022  9:34 AM CDT -----  Type: Patient Call Back    Who called: self     What is the request in detail: Pt states that taking the 1/2 donepeziL (ARICEPT) 10 MG is also making him sick, nausea and off balance, is there something else he can take. Pt last dose was last night and he has been eating with the medication he says     Can the clinic reply by [facility]?    Would the patient rather a call back or a response via [facility]? Call     Best call back number: [phone] (home)         Do you want to change this     Informed patient to stop medication

## 2022-08-10 ENCOUNTER — OFFICE VISIT (OUTPATIENT)
Dept: PHYSICAL MEDICINE AND REHAB | Facility: CLINIC | Age: 82
End: 2022-08-10
Payer: MEDICARE

## 2022-08-10 ENCOUNTER — TELEPHONE (OUTPATIENT)
Dept: FAMILY MEDICINE | Facility: CLINIC | Age: 82
End: 2022-08-10
Payer: MEDICARE

## 2022-08-10 ENCOUNTER — OFFICE VISIT (OUTPATIENT)
Dept: FAMILY MEDICINE | Facility: CLINIC | Age: 82
End: 2022-08-10
Payer: MEDICARE

## 2022-08-10 VITALS
HEART RATE: 72 BPM | DIASTOLIC BLOOD PRESSURE: 72 MMHG | BODY MASS INDEX: 27.39 KG/M2 | HEIGHT: 68 IN | WEIGHT: 180.75 LBS | SYSTOLIC BLOOD PRESSURE: 116 MMHG

## 2022-08-10 VITALS
SYSTOLIC BLOOD PRESSURE: 120 MMHG | BODY MASS INDEX: 28.04 KG/M2 | HEART RATE: 68 BPM | TEMPERATURE: 98 F | DIASTOLIC BLOOD PRESSURE: 60 MMHG | OXYGEN SATURATION: 95 % | WEIGHT: 184.44 LBS

## 2022-08-10 DIAGNOSIS — J44.89 COPD WITH CHRONIC BRONCHITIS AND EMPHYSEMA: ICD-10-CM

## 2022-08-10 DIAGNOSIS — M54.41 CHRONIC RIGHT-SIDED LOW BACK PAIN WITH RIGHT-SIDED SCIATICA: ICD-10-CM

## 2022-08-10 DIAGNOSIS — J43.9 COPD WITH CHRONIC BRONCHITIS AND EMPHYSEMA: ICD-10-CM

## 2022-08-10 DIAGNOSIS — I12.9 BENIGN HYPERTENSION WITH CHRONIC KIDNEY DISEASE, STAGE III: ICD-10-CM

## 2022-08-10 DIAGNOSIS — N18.30 BENIGN HYPERTENSION WITH CHRONIC KIDNEY DISEASE, STAGE III: ICD-10-CM

## 2022-08-10 DIAGNOSIS — G89.29 CHRONIC RIGHT SHOULDER PAIN: ICD-10-CM

## 2022-08-10 DIAGNOSIS — Z79.01 CHRONIC ANTICOAGULATION: ICD-10-CM

## 2022-08-10 DIAGNOSIS — L30.8 PRURITIC DERMATITIS: Primary | ICD-10-CM

## 2022-08-10 DIAGNOSIS — R26.9 GAIT DISORDER: Primary | ICD-10-CM

## 2022-08-10 DIAGNOSIS — D84.821 DRUG-INDUCED IMMUNODEFICIENCY: ICD-10-CM

## 2022-08-10 DIAGNOSIS — Z12.11 COLON CANCER SCREENING: ICD-10-CM

## 2022-08-10 DIAGNOSIS — M25.511 CHRONIC RIGHT SHOULDER PAIN: ICD-10-CM

## 2022-08-10 DIAGNOSIS — I70.0 ATHEROSCLEROSIS OF ABDOMINAL AORTA: ICD-10-CM

## 2022-08-10 DIAGNOSIS — F33.0 MAJOR DEPRESSIVE DISORDER, RECURRENT EPISODE, MILD: ICD-10-CM

## 2022-08-10 DIAGNOSIS — R42 VERTIGO: ICD-10-CM

## 2022-08-10 DIAGNOSIS — G89.29 CHRONIC RIGHT-SIDED LOW BACK PAIN WITH RIGHT-SIDED SCIATICA: ICD-10-CM

## 2022-08-10 DIAGNOSIS — M17.0 PRIMARY OSTEOARTHRITIS OF BOTH KNEES: ICD-10-CM

## 2022-08-10 DIAGNOSIS — I50.42 CHRONIC COMBINED SYSTOLIC AND DIASTOLIC HEART FAILURE: ICD-10-CM

## 2022-08-10 DIAGNOSIS — F41.1 ANXIETY STATE: ICD-10-CM

## 2022-08-10 DIAGNOSIS — R06.09 DOE (DYSPNEA ON EXERTION): ICD-10-CM

## 2022-08-10 DIAGNOSIS — E78.5 HYPERLIPIDEMIA LDL GOAL <100: ICD-10-CM

## 2022-08-10 DIAGNOSIS — I25.118 CORONARY ARTERY DISEASE OF NATIVE ARTERY OF NATIVE HEART WITH STABLE ANGINA PECTORIS: ICD-10-CM

## 2022-08-10 DIAGNOSIS — E11.42 DIABETIC PERIPHERAL NEUROPATHY: ICD-10-CM

## 2022-08-10 DIAGNOSIS — Z79.899 DRUG-INDUCED IMMUNODEFICIENCY: ICD-10-CM

## 2022-08-10 PROCEDURE — 3078F DIAST BP <80 MM HG: CPT | Mod: CPTII,S$GLB,, | Performed by: PHYSICAL MEDICINE & REHABILITATION

## 2022-08-10 PROCEDURE — 3288F FALL RISK ASSESSMENT DOCD: CPT | Mod: CPTII,S$GLB,, | Performed by: NURSE PRACTITIONER

## 2022-08-10 PROCEDURE — 99204 PR OFFICE/OUTPT VISIT, NEW, LEVL IV, 45-59 MIN: ICD-10-PCS | Mod: S$GLB,,, | Performed by: PHYSICAL MEDICINE & REHABILITATION

## 2022-08-10 PROCEDURE — 1157F ADVNC CARE PLAN IN RCRD: CPT | Mod: CPTII,S$GLB,, | Performed by: NURSE PRACTITIONER

## 2022-08-10 PROCEDURE — 3078F PR MOST RECENT DIASTOLIC BLOOD PRESSURE < 80 MM HG: ICD-10-PCS | Mod: CPTII,S$GLB,, | Performed by: PHYSICAL MEDICINE & REHABILITATION

## 2022-08-10 PROCEDURE — 99999 PR PBB SHADOW E&M-EST. PATIENT-LVL V: ICD-10-PCS | Mod: PBBFAC,,, | Performed by: NURSE PRACTITIONER

## 2022-08-10 PROCEDURE — 99499 RISK ADDL DX/OHS AUDIT: ICD-10-PCS | Mod: S$GLB,,, | Performed by: NURSE PRACTITIONER

## 2022-08-10 PROCEDURE — 3074F SYST BP LT 130 MM HG: CPT | Mod: CPTII,S$GLB,, | Performed by: NURSE PRACTITIONER

## 2022-08-10 PROCEDURE — 99999 PR PBB SHADOW E&M-EST. PATIENT-LVL V: CPT | Mod: PBBFAC,,, | Performed by: NURSE PRACTITIONER

## 2022-08-10 PROCEDURE — 3078F DIAST BP <80 MM HG: CPT | Mod: CPTII,S$GLB,, | Performed by: NURSE PRACTITIONER

## 2022-08-10 PROCEDURE — 99999 PR PBB SHADOW E&M-EST. PATIENT-LVL II: ICD-10-PCS | Mod: PBBFAC,,, | Performed by: PHYSICAL MEDICINE & REHABILITATION

## 2022-08-10 PROCEDURE — 99204 OFFICE O/P NEW MOD 45 MIN: CPT | Mod: S$GLB,,, | Performed by: PHYSICAL MEDICINE & REHABILITATION

## 2022-08-10 PROCEDURE — 1159F MED LIST DOCD IN RCRD: CPT | Mod: CPTII,S$GLB,, | Performed by: NURSE PRACTITIONER

## 2022-08-10 PROCEDURE — 3074F PR MOST RECENT SYSTOLIC BLOOD PRESSURE < 130 MM HG: ICD-10-PCS | Mod: CPTII,S$GLB,, | Performed by: NURSE PRACTITIONER

## 2022-08-10 PROCEDURE — 1125F PR PAIN SEVERITY QUANTIFIED, PAIN PRESENT: ICD-10-PCS | Mod: CPTII,S$GLB,, | Performed by: PHYSICAL MEDICINE & REHABILITATION

## 2022-08-10 PROCEDURE — 1125F AMNT PAIN NOTED PAIN PRSNT: CPT | Mod: CPTII,S$GLB,, | Performed by: PHYSICAL MEDICINE & REHABILITATION

## 2022-08-10 PROCEDURE — 3078F PR MOST RECENT DIASTOLIC BLOOD PRESSURE < 80 MM HG: ICD-10-PCS | Mod: CPTII,S$GLB,, | Performed by: NURSE PRACTITIONER

## 2022-08-10 PROCEDURE — 3074F PR MOST RECENT SYSTOLIC BLOOD PRESSURE < 130 MM HG: ICD-10-PCS | Mod: CPTII,S$GLB,, | Performed by: PHYSICAL MEDICINE & REHABILITATION

## 2022-08-10 PROCEDURE — 99499 UNLISTED E&M SERVICE: CPT | Mod: S$GLB,,, | Performed by: NURSE PRACTITIONER

## 2022-08-10 PROCEDURE — 3074F SYST BP LT 130 MM HG: CPT | Mod: CPTII,S$GLB,, | Performed by: PHYSICAL MEDICINE & REHABILITATION

## 2022-08-10 PROCEDURE — 1159F PR MEDICATION LIST DOCUMENTED IN MEDICAL RECORD: ICD-10-PCS | Mod: CPTII,S$GLB,, | Performed by: NURSE PRACTITIONER

## 2022-08-10 PROCEDURE — 1157F ADVNC CARE PLAN IN RCRD: CPT | Mod: CPTII,S$GLB,, | Performed by: PHYSICAL MEDICINE & REHABILITATION

## 2022-08-10 PROCEDURE — 99999 PR PBB SHADOW E&M-EST. PATIENT-LVL II: CPT | Mod: PBBFAC,,, | Performed by: PHYSICAL MEDICINE & REHABILITATION

## 2022-08-10 PROCEDURE — 1157F PR ADVANCE CARE PLAN OR EQUIV PRESENT IN MEDICAL RECORD: ICD-10-PCS | Mod: CPTII,S$GLB,, | Performed by: NURSE PRACTITIONER

## 2022-08-10 PROCEDURE — 3288F PR FALLS RISK ASSESSMENT DOCUMENTED: ICD-10-PCS | Mod: CPTII,S$GLB,, | Performed by: NURSE PRACTITIONER

## 2022-08-10 PROCEDURE — 1101F PT FALLS ASSESS-DOCD LE1/YR: CPT | Mod: CPTII,S$GLB,, | Performed by: NURSE PRACTITIONER

## 2022-08-10 PROCEDURE — 99214 OFFICE O/P EST MOD 30 MIN: CPT | Mod: S$GLB,,, | Performed by: NURSE PRACTITIONER

## 2022-08-10 PROCEDURE — 99214 PR OFFICE/OUTPT VISIT, EST, LEVL IV, 30-39 MIN: ICD-10-PCS | Mod: S$GLB,,, | Performed by: NURSE PRACTITIONER

## 2022-08-10 PROCEDURE — 1101F PR PT FALLS ASSESS DOC 0-1 FALLS W/OUT INJ PAST YR: ICD-10-PCS | Mod: CPTII,S$GLB,, | Performed by: NURSE PRACTITIONER

## 2022-08-10 PROCEDURE — 1157F PR ADVANCE CARE PLAN OR EQUIV PRESENT IN MEDICAL RECORD: ICD-10-PCS | Mod: CPTII,S$GLB,, | Performed by: PHYSICAL MEDICINE & REHABILITATION

## 2022-08-10 RX ORDER — HYDROXYZINE HYDROCHLORIDE 25 MG/1
25 TABLET, FILM COATED ORAL 3 TIMES DAILY PRN
Qty: 90 TABLET | Refills: 3 | Status: SHIPPED | OUTPATIENT
Start: 2022-08-10 | End: 2024-01-05

## 2022-08-10 RX ORDER — CERAMIDE 1,3,6-II/SALICYLIC/B3
CLEANSER (ML) TOPICAL
Qty: 355 ML | Refills: 1 | Status: SHIPPED | OUTPATIENT
Start: 2022-08-10

## 2022-08-10 NOTE — TELEPHONE ENCOUNTER
----- Message from Kate Brandt sent at 8/9/2022 11:21 AM CDT -----  Type: Patient Call Back    Who called: self     What is the request in detail: pt is asking for a call back he is itching a lot on both arms he has scratched to the point of bleeding he has been putting cream on it with no relief.  Please call him and advise     Can the clinic reply by [facility]?    Would the patient rather a call back or a response via [facility]? Call     Best call back number: [phone] (home)       Additional Information:

## 2022-08-10 NOTE — PATIENT INSTRUCTIONS
//////////PHONE NUMBERS//////////    Bariatrics---[phone]  Breast Surgery---[phone]  Case Management---[phone]  Colonoscopy---[phone]  Imaging, Xray, CT, MRI, Ultrasound---[phone]  Infectious Disease---[phone]  Interventional Radiology---[phone]  Medical Records---[phone]  [facility] On [facility]---[phone]  DME---[phone]  Optometry/Ophthalmology---[phone]  [facility]---[phone]  Physical Therapy---[phone]  Psychiatry---[phone]  Plastic Surgery---[phone]  Sleep Study---[phone]  Smoking Cessation---[phone]  Vaccine Hotline---[phone]  Wound Care---[phone]  COVID Vaccine center---[phone]  Referral Desk---[phone]    /////////////////////////////////////////////////    Patient Education       Anxiety Discharge Instructions, Adult   About this topic   Anxiety can cause you to feel very worried. It can also cause physical symptoms like chest pain, stomach aches, or trouble sleeping. While mild anxiety is a normal response to stress, it can cause you problems in your everyday life. You may need follow-up care to help manage your anxiety. Anxiety happens in many forms, like:  Being scared all the time that something bad is going to happen. This is generalized anxiety.  Strong bursts of fear where your body has signs that may feel like a heart attack. This is called a panic attack.  Upsetting thoughts that happen often. There is a need to repeat doing certain things to help get rid of the anxiety caused by these thoughts. The thoughts or actions may be about checking on things, touching things, or worry about germs. This is an obsessive-compulsive disorder.  Strong fear of an object, place, or condition. This is a phobia.  Fear that others think bad things about you or being put down by other people. This is social anxiety.  Nightmares, flashbacks, staying away from people, or having panic attacks when reminded of a shocking or hurtful  time or place from the past. This is post-traumatic stress.  Anxiety disorder may be treated in many ways. Some kinds of treatment have you talk about your beliefs, fears, and worries. You may learn how certain thoughts or feelings can raise anxiety. You may also learn what steps to take to lower anxiety. Other kinds of treatment may have you look back on a hurtful event, sad memory, or something you are afraid of. The doctor will help you deal with the feelings that you may have. You may learn ways to cope with unwanted events or thoughts by looking at your fears in a way that feels safe.  What care is needed at home?   Ask your doctor what you need to do when you go home. Make sure you ask questions if you do not understand what the doctor says.  Set a time to talk with a counselor about your worries and feelings. This can help you with your anxiety.  Take care to follow all instructions when you take your medicines.  Limit alcohol and caffeine.  Learn ways to manage stress. Relaxation methods like reflection, deep breathing, and muscle relaxation may be helpful. Things like yoga, exercise, and wan chi are also good.  Talk about your feelings with family members and friends you trust. Talk to someone who can help you see how your thoughts at certain times may raise your anxiety.     What follow-up care is needed?   Your doctor may ask you to make visits to the office to check on your progress. Be sure to keep these visits.  What drugs may be needed?   The doctor may order drugs to help the physical signs of anxiety. Make sure that you take the drugs as taught to you by the doctor. Talk with your doctor about any side effects and ask how long you should take the drug.  Will physical activity be limited?   You may take part in physical activities. Some people are limited because of their anxiety or fear. Talk with your doctor about the right amount of activity for you.  What changes to diet are needed?   Eat a  variety of healthy foods and limit drinks with caffeine. You should avoid alcohol, energy drinks, and over-the-counter stimulants.  What problems could happen?   If your anxiety is not treated, it can result in:  Staying away from work or social events  Not being able to do everyday tasks  Keeping away from family and friends  What can be done to prevent this health problem?   Learn what events, people, or things upset you. Limit your contact with them.  Talk about your feelings. Talk to someone who can help you see how your thoughts at certain times may raise your anxiety.  Seek support from your friends and family. Find someone who calms you down. Ask if you can call them when you are getting anxious.  When do I need to call the doctor?   You feel you may harm yourself or someone else.  You can also call a mental health hotline for help.  You have any physical symptoms, such as chest pain, trouble breathing, or severe belly pain, that could be a sign of a serious problem.  If you are short of breath.  If you do not feel like you can be alone.  Teach Back: Helping You Understand   The Teach Back Method helps you understand the information we are giving you. After you talk with the staff, tell them in your own words what you learned. This helps to make sure the staff has described each thing clearly. It also helps to explain things that may have been confusing. Before going home, make sure you can do these:  I can tell you about my condition and the drugs I need to take.  I can tell you what may help lower my anxiety.  I can tell you what I will do if it is hard to breathe or I have chest pain.  I can tell you what I will do if I do not feel safe or cannot be alone.  Where can I learn more?   [facility]  https://www.vinny.org/Learn-More/Mental-Health-Conditions/Anxiety-Disorders   National Health Service  https://www.nhs.uk/conditions/generalised-anxiety-disorder/symptoms/   National [place] of Health ? Senior  Health  https://www.sami.nih.gov/health/relieving-stress-anxiety-zmnpzlrxl-clmnzmwjaj-gphhyiqctt   National [place] of Mental Health  http://www.nimh.nih.gov/health/publications/anxiety-disorders/complete-index.shtml   Last Reviewed Date   2021-06-08  Consumer Information Use and Disclaimer   This information is not specific medical advice and does not replace information you receive from your health care provider. This is only a brief summary of general information. It does NOT include all information about conditions, illnesses, injuries, tests, procedures, treatments, therapies, discharge instructions or life-style choices that may apply to you. You must talk with your health care provider for complete information about your health and treatment options. This information should not be used to decide whether or not to accept your health care providers advice, instructions or recommendations. Only your health care provider has the knowledge and training to provide advice that is right for you.  Copyright   Copyright © 2021 [employer] and its affiliates and/or licensors. All rights reserved.

## 2022-08-10 NOTE — PROGRESS NOTES
Subjective:       Patient ID: Percy Ramirez is a 81 y.o. male.    Chief Complaint: No chief complaint on file.      HPI        Mr. Ramirez is a 81-year-old white male with multiple medical problems who is presenting to the Physical Medicine Clinic for evaluation for a power mobility device.  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    The patient lives with his son and daughter in a single-story home with 2 steps to enter.  He is independent with feeding himself.  He is independent with dressing and grooming.  He is independent with toileting.  He is independent with bathing with a shower chair and safety grab bars.  It takes him a long time to do his activities of daily living.  He ambulates using a Rollator walker.  When his symptoms are active, he can go about 15-20 feet.  He is restricted by shortness of breath, fatigue, leg weakness, chronic low back pain with right sciatica (up to 10/10), bilateral knee pain (up to 10/10), and intermittent vertigo.  He cannot propel a manual wheelchair due to shortness of breath, fatigue, and chronic right shoulder pain (up to 9/10).  He tried a scooter in [employer] stores and was able to ride it and maneuver it without significant problems.      Past Medical History:   Diagnosis Date    Allergy     Arthritis     CAD, multiple vessel     DR Melissa    Cataract     Depression     History of colon polyps     Hyperlipidemia     Hypertension     Macular degeneration     Dr Razo for injectionJennifer for eye    S/P CABG x 2     Type 2 diabetes mellitus with circulatory disorder     Dr. Aquino        Review of patient's allergies indicates:   Allergen  Reactions    Aricept odt [donepezil] Diarrhea and Nausea And Vomiting    Codeine Nausea Only     weak    Ranexa [ranolazine]         Review of Systems   Constitutional: Positive for fatigue. Negative for chills and fever.   Eyes: Positive for visual disturbance.   Respiratory: Positive for shortness of breath.    Cardiovascular: Negative for chest pain.   Gastrointestinal: Negative for nausea and vomiting.   Musculoskeletal: Positive for arthralgias, back pain and gait problem.   Neurological: Positive for vertigo and numbness. Negative for dizziness and headaches.   Psychiatric/Behavioral: Negative for behavioral problems.             Objective:      Physical Exam  Vitals reviewed.   Constitutional:       General: He is not in acute distress.     Appearance: He is well-developed.   HENT:      Head: Normocephalic and atraumatic.   Eyes:      Extraocular Movements: Extraocular movements intact.   Cardiovascular:      Rate and Rhythm: Normal rate and regular rhythm.      Heart sounds: Normal heart sounds.   Pulmonary:      Breath sounds: Normal breath sounds.   Abdominal:      Palpations: Abdomen is soft.   Musculoskeletal:      Cervical back: Normal range of motion.      Comments: BUE:  ROM:   RUE: full.   LUE: full.  Strength:    RUE: 4/5 at shoulder abduction, 5- elbow flexion, 5- elbow extension, 5- hand .   LUE: 5-/5 at shoulder abduction, 5- elbow flexion, 5- elbow extension, 5- hand .  Sensation to pinprick:   RUE: intact.   LUE: intact.  DTR:    RUE: +2 biceps, +1 triceps.   LUE:  +2 biceps, +1 triceps.    Impingement Signs:  Neer:  RUE: +ve    LUE: -ve  Becerra: RUE: +ve    LUE: -ve     BLE:  ROM:   RLE: full.   LLE: full.  Knees:   RLE: severe crepitus.   LLE: severe crepitus.   Strength:    RLE: 3/5 at hip flexion, 5- knee extension, 5- ankle DF, 5- ankle PF.   LLE: 3/5 at hip flexion, 5- knee extension, 5- ankle DF, 5- ankle PF.  Sensation to pinprick:     RLE: intact.      LLE: intact.   DTR:      RLE: +1 knee, +1 ankle.    LLE: +1 knee, +1 ankle.  SLR (sitting):      RLE: -ve.      LLE: -ve.     -ve tenderness over lumbar spine.    Gait: slow keesha, stooped posture, using a rollator.     Skin:     Findings: Bruising present.   Neurological:      Mental Status: He is alert and oriented to person, place, and time.   Psychiatric:         Mood and Affect: Mood normal.         Behavior: Behavior normal.         Assessment:         ICD-10-CM ICD-9-CM   1. Gait disorder  R26.9 781.2   2. Chronic combined systolic and diastolic heart failure  I50.42 428.42   3. Coronary artery disease of native artery of native heart with stable angina pectoris  I25.118 414.01     413.9   4. COPD with chronic bronchitis and emphysema  J44.9 491.20   5. CORTES (dyspnea on exertion)  R06.00 786.09   6. Diabetic peripheral neuropathy  E11.42 250.60     357.2   7. Chronic right-sided low back pain with right-sided sciatica  M54.41 724.2    G89.29 724.3     338.29   8. Primary osteoarthritis of both knees  M17.0 715.16   9. Chronic anticoagulation (with coumadin)  Z79.01 V58.61   10. Vertigo  R42 780.4   11. Chronic right shoulder pain  M25.511 719.41    G89.29 338.29       Assessment/Plan:           - The patient was seen today for mobility evaluation for a power mobility device due to significant impairment at home.  - The patient has multifactorial gait impairment.  - The patient is not able to ambulate safely at home.  - The patient is unable to use a walker functional distances due to CORTES because of COPD and CHF, fatigue due to multiple comorbidities, bilateral lower extremity weakness mostly proximally, chronic low back pain with right radiculopathy, bilateral knee pain due to advanced OA (not a surgical candidate due to multiple comorbidities and anticoagulation therapy), and intermittent vertigo.  - The patient is unable to use an optimally-configured manual wheelchair at home due to CORTES because of COPD and CHF, fatigue,  chronic right shoulder pain due to rotator cuff arthropathy.  - The patient has intact cognition and should be able to use a power mobility device well at home.  - A prescription for an electric scooter was generated.  - The patient has enough upper & lower extremity strength to be able to transfer to and from the power mobility device.  - The patient has enough range of motion & strength in BUE to allow functional operation of the tiller.  - The patient has good trunk balance and should be able to maintain a safe posture while operating a power mobility device.  - This will allow the patient to go safely to the kitchen, dining room or living room for feeding & socialization.  It should also help with energy conservation and reducing the risk of falls which could be detrimental to his health due to anticoagulation with Coumadin.  - The patient is to return the Physical Medicine/Mobility clinic prn.      This note was partly generated with [employer] voice recognition software. I apologize for any possible typographical errors.

## 2022-08-10 NOTE — PROGRESS NOTES
"  HPI     Chief Complaint:  Chief Complaint   Patient presents with    Itching       Percy Ramirez is a 81 y.o. male with multiple medical diagnoses as listed in the medical history and problem list that presents for itching.  Pt is new to me but is known to this clinic with his last appointment being 4/4/2022.      Anxiety  Presents for initial visit. Onset was 1 to 6 months ago. The problem has been gradually worsening. Symptoms include excessive worry, irritability, nervous/anxious behavior and restlessness. Patient reports no chest pain, compulsions, confusion, decreased concentration, depressed mood, dizziness, dry mouth, feeling of choking, hyperventilation, impotence, insomnia, malaise, muscle tension, nausea, obsessions, palpitations, shortness of breath or suicidal ideas. Symptoms occur most days. The severity of symptoms is moderate. The symptoms are aggravated by family issues. The quality of sleep is good. Nighttime awakenings: occasional.     His past medical history is significant for anxiety/panic attacks and depression.      Pt reports taking valium 2 mg prn. "on average I might take a valium once a week." He is compliant with duloxetine 60 mg qd.       Itching:  Onset of symptoms >4 months ago. Waxing and waning. Reports symptoms are triggered by anxiety. Pt denies medication changes. Reports use of topical benadryl and cortisone with moderate relief. "but as soon as I get anxious I itch again."        he is compliant with medications daily without any adverse side effects.    Assessment & Plan     Problem List Items Addressed This Visit        Psychiatric    Anxiety state, unspecified    Encouraged the patient to perform self-calming techniques, such as deep breathing/relaxation techniques and exercise.    As below.     Overview     Dx updated per 2019 IMO Load           Relevant Medications    hydrOXYzine HCL (ATARAX) 25 MG tablet    Major depressive disorder, recurrent episode, mild    Denies " thoughts of self harm.    Continue current medication.          Pulmonary    COPD with chronic bronchitis and emphysema    The current medical regimen is effective;  continue present plan      Overview     fev1 1.60 (57%) 5/20. Patient also refuse oxygen. No obvious evidence at this time for need oxygen.  Quit smoking since 1960s.  Continue with trelegy and ventolin to maintain lung function.  Patient is up to date with vaccination.                Cardiac/Vascular    Atherosclerosis of abdominal aorta    -assessed and addressed all modifiable risk factors.  Continue with appropriate management to prevent complications with regards to lipid and BP monitoring.      Overview     - noted on ultrasound 10/25/2013           Benign hypertension with chronic kidney disease, stage III    Continue sodium restriction, and avoidance of nephrotoxic agents.      Hyperlipidemia LDL goal <100    discussed ways to lower triglycerides such as cutting simple sugars out of diet (white breads, candies, cookies, cakes, etc.) and reducing/eliminating intake of highly processed trans fatty acids.   Exercise 30 minutes a day for 4-5 days a week.   Eat more fiber.           Immunology/Multi System    Drug-induced immunodeficiency    The current medical regimen is effective;  continue present plan         Endocrine    Type 2 diabetes, uncontrolled, with neuropathy    -discussed with patient about routine diabetic care that includes but are not limited to regular eye exams, skin care, daily foot exam, proper nutrition, regular BG monitoring at home (fasting and mealtime) and medication compliance in a diabetic.  Target morning BS  and meal-time BS <180      Overview     Plan to switch from gabapentin to Lyrica to afford better pain control. Plan to do EMG/NCS if no improvement at that time.             Other Visit Diagnoses     Pruritic dermatitis    -  Primary    See media. Located to dorsal side of bilateral arms. No s/s of infection.  Per pt pruritis is triggered by anxiety. Primary source of anxiety is pt's stressful relationship with his son who is dependent on pt.     Discussed anxiety reduction techniques.     Continue valium prn and duloxetine 60 mg qd.     Add atarax tid prn for pruritis and anxiety.     Use cerave moisturizing cream bid.     Avoid scratching, wash hands. Discussed potential complications of this behavior especially risk of infection.    Discussed DDx, condition, and treatment.   Education sent to patient portal/included in after visit summary.  ED precautions given.   Notify provider if symptoms do not resolve or increase in severity.   Patient verbalizes understanding and agrees with plan of care.      Relevant Medications    hydrOXYzine HCL (ATARAX) 25 MG tablet    ceramides 1,3,6-II (CERAVE DAILY MOISTURIZING) Lotn    Colon cancer screening        Relevant Orders    Case Request Endoscopy: COLONOSCOPY (Completed)        This provider recommended obtaining blood work today, pt declined. Pt states he will obtain labs at [place] later this week and [facility]   --------------------------------------------      Health Maintenance:  Health Maintenance       Date Due Completion Date    Colonoscopy 10/04/2020 10/4/2017    Override on 8/6/2007: Done    Influenza Vaccine (1) 09/01/2022 10/13/2021    Hemoglobin A1c 09/01/2022 6/1/2022    Override on 12/9/2016: Done (A1c - 7.5)    Override on 6/6/2016: Done (7.0)    Override on 1/27/2015: Done (7.9%)    Diabetes Urine Screening 12/01/2022 12/1/2021    TETANUS VACCINE 05/23/2023 5/23/2013    Eye Exam 05/26/2023 5/26/2022    Override on 2/3/2017: Done (Dr. Arevalo)    Override on 1/22/2016: Done (Jennifer - mild bilateral diabetic retinopathy; severe bilateral dry macular degeneration)    Override on 5/8/2015: Done    Override on 10/10/2014: Done    Lipid Panel 06/01/2023 6/1/2022    Override on 6/6/2016: Done (total 104, HDL 32, LDL 57, TG 73)    Override  on 1/27/2015: Done (HDL=36, TG=38, LDL=57)    Foot Exam 06/07/2023 6/7/2022    Override on 1/30/2020: Done    Override on 1/14/2019: Done    Override on 6/20/2018: Done    Override on 12/4/2017: Done    Override on 9/13/2017: Done    Override on 7/12/2017: Done    Override on 5/11/2017: Done          Health maintenance reviewed.  C-scope.     Follow Up:  Follow up in about 2 weeks (around 8/24/2022), or if symptoms worsen or fail to improve.    Exam     Review of Systems:  (as noted above)  Review of Systems   Constitutional: Positive for irritability. Negative for chills, fever and unexpected weight change.   HENT: Negative for sore throat and trouble swallowing.    Eyes: Positive for visual disturbance (decreased acuity bilaterally, chronic, denies changes. ).   Respiratory: Negative for cough, chest tightness, shortness of breath and wheezing.    Cardiovascular: Negative for chest pain and palpitations.   Gastrointestinal: Negative for anal bleeding, blood in stool and nausea.   Endocrine: Negative for polydipsia and polyphagia.   Genitourinary: Negative for decreased urine volume, hematuria and impotence.   Musculoskeletal: Positive for gait problem (cane).   Skin: Positive for rash.   Neurological: Negative for dizziness, seizures and speech difficulty.   Psychiatric/Behavioral: Negative for confusion, decreased concentration and suicidal ideas. The patient is nervous/anxious. The patient does not have insomnia.        Physical Exam:   Physical Exam  Constitutional:       General: He is not in acute distress.     Appearance: He is not ill-appearing or diaphoretic.   HENT:      Head: Normocephalic and atraumatic.   Eyes:      General: No scleral icterus.     Comments: Decreased visual acuity bilaterally.    Neck:      Vascular: No carotid bruit.   Cardiovascular:      Rate and Rhythm: Normal rate and regular rhythm.      Pulses: Normal pulses.      Heart sounds: No murmur heard.    No friction rub. No gallop.    Pulmonary:      Effort: No respiratory distress.      Breath sounds: No wheezing.   Chest:      Chest wall: No tenderness.   Musculoskeletal:         General: No signs of injury.      Cervical back: No rigidity or tenderness.   Lymphadenopathy:      Cervical: No cervical adenopathy.   Skin:     Capillary Refill: Capillary refill takes 2 to 3 seconds.      Findings: Erythema and rash present.             Comments: See media     Neurological:      Mental Status: He is alert.      Cranial Nerves: No cranial nerve deficit.      Sensory: No sensory deficit.      Motor: No weakness.      Gait: Gait abnormal (cane).   Psychiatric:         Mood and Affect: Mood is anxious and depressed.         Thought Content: Thought content does not include suicidal ideation. Thought content does not include suicidal plan.       Vitals:    08/10/22 1631   BP: 120/60   Pulse: 68   Temp: 98.2 °F (36.8 °C)   TempSrc: Oral   SpO2: 95%   Weight: 83.7 kg (184 lb 6.6 oz)      Body mass index is 28.04 kg/m².            History     Past Medical History:  Past Medical History:   Diagnosis Date    Allergy     Arthritis     CAD, multiple vessel     DR Melissa    Cataract     Depression     History of colon polyps     Hyperlipidemia     Hypertension     Macular degeneration     Dr Razo for injection, Jennifer for eye    S/P CABG x 2     Type 2 diabetes mellitus with circulatory disorder     Dr. Aquino       Past Surgical History:  Past Surgical History:   Procedure Laterality Date    broken elbow      left    COLONOSCOPY N/A 10/4/2017    Procedure: COLONOSCOPY;  Surgeon: Gabriel Leung MD;  Location: [facility];  Service: Endoscopy;  Laterality: N/A;    EYE SURGERY      cataract    heart bypass      2004    HERNIA REPAIR      right    ROTATOR CUFF REPAIR      right  2004       Social History:  Social History     Socioeconomic History    Marital status:     Number of children: 4    Years of education: GED  "  Occupational History    Occupation: retired tug    Tobacco Use    Smoking status: Former Smoker     Packs/day: 3.00     Years: 45.00     Pack years: 135.00     Types: Cigarettes     Quit date: 2004     Years since quittin.3    Smokeless tobacco: Never Used   Substance and Sexual Activity    Alcohol use: Yes     Comment: was heavy drinker 35 years ago, rare use now    Drug use: No    Sexual activity: Yes     Partners: Female       Family History:  Family History   Problem Relation Age of Onset    Arthritis Mother     Diabetes Mother     Stroke Mother     Heart attack Father     Cancer Brother     Throat cancer Brother     Diabetes Maternal Aunt     Diabetes Maternal Uncle     Heart disease Maternal Uncle     Diabetes Paternal Aunt     Heart disease Paternal Aunt     Heart disease Paternal Uncle     Heart disease Maternal Grandmother     Cancer Maternal Grandfather        Allergies and Medications: (updated and reviewed)  Review of patient's allergies indicates:   Allergen Reactions    Aricept odt [donepezil] Diarrhea and Nausea And Vomiting    Codeine Nausea Only     weak    Ranexa [ranolazine]      Current Outpatient Medications   Medication Sig Dispense Refill    acetaminophen (TYLENOL) 325 MG tablet Take 2 tablets (650 mg total) by mouth every 6 (six) hours as needed. 13 tablet 0    albuterol (PROVENTIL/VENTOLIN HFA) 90 mcg/actuation inhaler INHALE 2 PUFFS BY MOUTH EVERY 6 HOURS AS NEEDED FOR WHEEZING OR  SHORTNESS  OF  BREATH 54 g 0    atorvastatin (LIPITOR) 40 MG tablet Take 40 mg by mouth once daily.      BD INSULIN PEN NEEDLE UF SHORT 31 gauge x 5/16" Ndle       BD INSULIN SYRINGE ULTRA-FINE 1/2 mL 31 gauge x 15/64" Syrg       blood sugar diagnostic Strp To check BG 3 times daily, to use with insurance preferred meter 200 strip 11    carvedilol (COREG) 25 MG tablet Take 1 tablet (25 mg total) by mouth 2 (two) times daily. 180 tablet 0    cinnamon bark 500 mg " "capsule Take 1,000 mg by mouth once daily.        clopidogreL (PLAVIX) 75 mg tablet Take 75 mg by mouth.      diclofenac sodium (VOLTAREN) 1 % Gel Apply 2 g topically 2 (two) times daily. 100 g 0    diclofenac sodium (VOLTAREN) 1 % Gel Apply 2 g topically 3 (three) times daily. 50 g 0    donepeziL (ARICEPT) 10 MG tablet Take 1 tablet (10 mg total) by mouth every evening. Take one half tablet for one week then as prescribed. 30 tablet 11    ENTRESTO  mg per tablet Take 1 tablet by mouth 2 (two) times daily.      fluticasone propionate (FLONASE) 50 mcg/actuation nasal spray 1 spray (50 mcg total) by Each Nostril route 2 (two) times daily. 18.2 mL 3    furosemide (LASIX) 40 MG tablet Take 40 mg by mouth once daily.      gabapentin (NEURONTIN) 300 MG capsule Take 4 capsules (1,200 mg total) by mouth 2 (two) times daily. (Patient taking differently: Take 900 mg by mouth 2 (two) times daily.) 540 capsule 1    glimepiride (AMARYL) 4 MG tablet Take 4 mg by mouth daily with breakfast.       HYDROcodone-acetaminophen (NORCO) 5-325 mg per tablet Take 1 tablet by mouth every 4 (four) hours as needed for Pain. Causes drowsiness. Do not drive or operate machinery with this medication. Do not drink alcohol with this medication. Causes constipation. Take with stool softener. 10 tablet 0    insulin NPH-insulin regular, 70/30, (NOVOLIN 70/30) 100 unit/mL (70-30) injection Inject into the skin. Inject 10 units at breakfast and inject 10 units at night      insulin syringe-needle U-100 0.3 mL 31 gauge x 15/64" Syrg USE TO INJECT INSULIN THREE TIMES DAILY      insulin syringe-needle U-100 1/2 mL 31 x 5/16" Syrg       isosorbide mononitrate (IMDUR) 30 MG 24 hr tablet Take 30 mg by mouth once daily.      lancets Misc To check BG 3 times daily, to use with insurance preferred meter 200 each 11    LIDOcaine (LIDODERM) 5 % Place 1 patch onto the skin once daily. Remove & Discard patch within 12 hours or as directed by " MD 5 patch 1    metformin (GLUCOPHAGE) 500 MG tablet Take 1,000 mg by mouth 2 (two) times daily with meals. 2 Tablets in the morning, 2 Tablets in the evening      metoclopramide HCl (REGLAN) 5 MG tablet Take 5 mg by mouth 3 (three) times daily as needed.      nitroGLYCERIN (NITROSTAT) 0.4 MG SL tablet DISSOLVE 1 TABLET UNDER THE TONGUE EVERY 5 MINUTES AS  NEEDED FOR CHEST PAIN. MAX  OF 3 TABLETS IN 15 MINUTES. CALL 911 IF PAIN PERSISTS.      pravastatin (PRAVACHOL) 20 MG tablet Take 1 tablet (20 mg total) by mouth once daily. 90 tablet 1    spironolactone (ALDACTONE) 25 MG tablet Take 25 mg by mouth.      VIT C/VIT E AC/LUT/COPPER/ZINC (PRESERVISION LUTEIN ORAL) Take by mouth.      warfarin (COUMADIN) 5 MG tablet Take 2 tabs by mouth on Tuesday,Thursday, Saturday and Sundays then 1.5 on all other days.      blood-glucose meter kit To check BG 3 times daily, to use with insurance preferred meter 1 each 0    ceramides 1,3,6-II (CERAVE DAILY MOISTURIZING) Lotn Apply twice daily to skin 355 mL 1    diazePAM (VALIUM) 2 MG tablet Take 1 tablet (2 mg total) by mouth every 12 (twelve) hours as needed for Anxiety (Dizziness). 20 tablet 0    DULoxetine (CYMBALTA) 60 MG capsule Take 1 capsule (60 mg total) by mouth once daily. 90 capsule 3    hydrOXYzine HCL (ATARAX) 25 MG tablet Take 1 tablet (25 mg total) by mouth 3 (three) times daily as needed for Itching or Anxiety. 90 tablet 3     No current facility-administered medications for this visit.       Patient Care Team:  Kasie Edmondson MD as PCP - General (Internal Medicine)  Jac Rodriguez OD as Consulting Provider (Optometry)  Trever Arevalo MD as Consulting Physician (Ophthalmology)  Philippe Aquino MD as Consulting Physician (Endocrinology)  Mac Valderrama MD as Consulting Physician (Cardiology)  Marifer Romero DPM as Consulting Physician (Podiatry)  Remedios Madison LPN as Licensed Practical Nurse      The patient expressed understanding and  no barriers to adherence were identified.      - The patient indicates understanding of these issues and agrees with the plan. Brief care plan is updated and reviewed with the patient as applicable.      - The patient is given an After Visit Summary that lists all medications with directions, allergies, education, orders placed during this encounter and follow-up instructions.      - I have reviewed the patient's medical information including past medical, family, and social history sections including the medications and allergies.      - We discussed the patient's current medications.     This note was created by combination of typed  and MModal dictation.  Transcription errors may be present.  If there are any questions, please contact me.       Juan Nunez NP

## 2022-08-16 ENCOUNTER — TELEPHONE (OUTPATIENT)
Dept: PHYSICAL MEDICINE AND REHAB | Facility: CLINIC | Age: 82
End: 2022-08-16
Payer: MEDICARE

## 2022-08-16 ENCOUNTER — OFFICE VISIT (OUTPATIENT)
Dept: PODIATRY | Facility: CLINIC | Age: 82
End: 2022-08-16
Payer: MEDICARE

## 2022-08-16 VITALS — BODY MASS INDEX: 27.89 KG/M2 | WEIGHT: 184 LBS | HEIGHT: 68 IN

## 2022-08-16 DIAGNOSIS — M25.511 CHRONIC RIGHT SHOULDER PAIN: ICD-10-CM

## 2022-08-16 DIAGNOSIS — J43.9 COPD WITH CHRONIC BRONCHITIS AND EMPHYSEMA: ICD-10-CM

## 2022-08-16 DIAGNOSIS — M17.0 PRIMARY OSTEOARTHRITIS OF BOTH KNEES: ICD-10-CM

## 2022-08-16 DIAGNOSIS — M20.11 HALLUX ABDUCTO VALGUS, RIGHT: ICD-10-CM

## 2022-08-16 DIAGNOSIS — G89.29 CHRONIC RIGHT SHOULDER PAIN: ICD-10-CM

## 2022-08-16 DIAGNOSIS — Z79.01 CHRONIC ANTICOAGULATION: ICD-10-CM

## 2022-08-16 DIAGNOSIS — M20.12 HALLUX ABDUCTO VALGUS, LEFT: ICD-10-CM

## 2022-08-16 DIAGNOSIS — R42 VERTIGO: ICD-10-CM

## 2022-08-16 DIAGNOSIS — I50.42 CHRONIC COMBINED SYSTOLIC AND DIASTOLIC HEART FAILURE: ICD-10-CM

## 2022-08-16 DIAGNOSIS — E11.49 TYPE II DIABETES MELLITUS WITH NEUROLOGICAL MANIFESTATIONS: ICD-10-CM

## 2022-08-16 DIAGNOSIS — B35.1 ONYCHOMYCOSIS DUE TO DERMATOPHYTE: ICD-10-CM

## 2022-08-16 DIAGNOSIS — L84 CORN OR CALLUS: ICD-10-CM

## 2022-08-16 DIAGNOSIS — M20.42 HAMMER TOES OF BOTH FEET: ICD-10-CM

## 2022-08-16 DIAGNOSIS — L60.0 INGROWN NAIL: ICD-10-CM

## 2022-08-16 DIAGNOSIS — R26.9 GAIT DISORDER: Primary | ICD-10-CM

## 2022-08-16 DIAGNOSIS — M20.41 HAMMER TOES OF BOTH FEET: ICD-10-CM

## 2022-08-16 DIAGNOSIS — J44.89 COPD WITH CHRONIC BRONCHITIS AND EMPHYSEMA: ICD-10-CM

## 2022-08-16 DIAGNOSIS — G89.29 CHRONIC RIGHT-SIDED LOW BACK PAIN WITH RIGHT-SIDED SCIATICA: ICD-10-CM

## 2022-08-16 DIAGNOSIS — M54.41 CHRONIC RIGHT-SIDED LOW BACK PAIN WITH RIGHT-SIDED SCIATICA: ICD-10-CM

## 2022-08-16 DIAGNOSIS — E11.42 DIABETIC PERIPHERAL NEUROPATHY: ICD-10-CM

## 2022-08-16 PROCEDURE — 1160F PR REVIEW ALL MEDS BY PRESCRIBER/CLIN PHARMACIST DOCUMENTED: ICD-10-PCS | Mod: CPTII,S$GLB,, | Performed by: PODIATRIST

## 2022-08-16 PROCEDURE — 3288F FALL RISK ASSESSMENT DOCD: CPT | Mod: CPTII,S$GLB,, | Performed by: PODIATRIST

## 2022-08-16 PROCEDURE — 1157F ADVNC CARE PLAN IN RCRD: CPT | Mod: CPTII,S$GLB,, | Performed by: PODIATRIST

## 2022-08-16 PROCEDURE — 11721 PR DEBRIDEMENT OF NAILS, 6 OR MORE: ICD-10-PCS | Mod: Q9,59,S$GLB, | Performed by: PODIATRIST

## 2022-08-16 PROCEDURE — 11056 PARNG/CUTG B9 HYPRKR LES 2-4: CPT | Mod: Q9,S$GLB,, | Performed by: PODIATRIST

## 2022-08-16 PROCEDURE — 1125F PR PAIN SEVERITY QUANTIFIED, PAIN PRESENT: ICD-10-PCS | Mod: CPTII,S$GLB,, | Performed by: PODIATRIST

## 2022-08-16 PROCEDURE — 1159F PR MEDICATION LIST DOCUMENTED IN MEDICAL RECORD: ICD-10-PCS | Mod: CPTII,S$GLB,, | Performed by: PODIATRIST

## 2022-08-16 PROCEDURE — 1101F PT FALLS ASSESS-DOCD LE1/YR: CPT | Mod: CPTII,S$GLB,, | Performed by: PODIATRIST

## 2022-08-16 PROCEDURE — 99999 PR PBB SHADOW E&M-EST. PATIENT-LVL IV: ICD-10-PCS | Mod: PBBFAC,,, | Performed by: PODIATRIST

## 2022-08-16 PROCEDURE — 1160F RVW MEDS BY RX/DR IN RCRD: CPT | Mod: CPTII,S$GLB,, | Performed by: PODIATRIST

## 2022-08-16 PROCEDURE — 11721 DEBRIDE NAIL 6 OR MORE: CPT | Mod: Q9,59,S$GLB, | Performed by: PODIATRIST

## 2022-08-16 PROCEDURE — 1159F MED LIST DOCD IN RCRD: CPT | Mod: CPTII,S$GLB,, | Performed by: PODIATRIST

## 2022-08-16 PROCEDURE — 11056 PR TRIM BENIGN HYPERKERATOTIC SKIN LESION,2-4: ICD-10-PCS | Mod: Q9,S$GLB,, | Performed by: PODIATRIST

## 2022-08-16 PROCEDURE — 1125F AMNT PAIN NOTED PAIN PRSNT: CPT | Mod: CPTII,S$GLB,, | Performed by: PODIATRIST

## 2022-08-16 PROCEDURE — 1157F PR ADVANCE CARE PLAN OR EQUIV PRESENT IN MEDICAL RECORD: ICD-10-PCS | Mod: CPTII,S$GLB,, | Performed by: PODIATRIST

## 2022-08-16 PROCEDURE — 3288F PR FALLS RISK ASSESSMENT DOCUMENTED: ICD-10-PCS | Mod: CPTII,S$GLB,, | Performed by: PODIATRIST

## 2022-08-16 PROCEDURE — 1101F PR PT FALLS ASSESS DOC 0-1 FALLS W/OUT INJ PAST YR: ICD-10-PCS | Mod: CPTII,S$GLB,, | Performed by: PODIATRIST

## 2022-08-16 PROCEDURE — 99499 UNLISTED E&M SERVICE: CPT | Mod: S$GLB,,, | Performed by: PODIATRIST

## 2022-08-16 PROCEDURE — 99499 NO LOS: ICD-10-PCS | Mod: S$GLB,,, | Performed by: PODIATRIST

## 2022-08-16 PROCEDURE — 99999 PR PBB SHADOW E&M-EST. PATIENT-LVL IV: CPT | Mod: PBBFAC,,, | Performed by: PODIATRIST

## 2022-08-16 NOTE — TELEPHONE ENCOUNTER
----- Message from Kate Brandt sent at 8/16/2022  9:10 AM CDT -----  Type: Patient Call Back    Who called: self     What is the request in detail: Pt had an order for a power scooter but his home is not compatible for that so he would like a regular wheelchair Please advise     Can the clinic reply by [facility]?    Would the patient rather a call back or a response via [facility]? Call    Best call back number: [phone] (home)

## 2022-08-16 NOTE — TELEPHONE ENCOUNTER
I received an assessment from the patient's wheelchair company.  A scooter would not be maneuverable l in his house.  I generated a prescription for a power wheelchair and will fax it to his wheelchair company.

## 2022-08-17 NOTE — PROGRESS NOTES
Subjective:      Patient ID: Percy Ramirez is a 81 y.o. male.    Chief Complaint: Diabetes Mellitus (PCP  (MICHAEL Nunez 08/10/2022)) and Nail Care    Percy is a 81 y.o. male who presents to the clinic for evaluation and treatment of high risk feet. Percy has a past medical history of Allergy, Arthritis, CAD, multiple vessel, Cataract, Depression, History of colon polyps, Hyperlipidemia, Hypertension, Macular degeneration, S/P CABG x 2, and Type 2 diabetes mellitus with circulatory disorder. The patient's chief complaint is elongated, thickened toenails aggravated by increased weight bearing, shoe gear, pressure. This patient has documented high risk feet requiring routine maintenance secondary to diabetes mellitis and those secondary complications of diabetes, as mentioned..    PCP: Kasie Edmondson MD    Date Last Seen by PCP:   Chief Complaint   Patient presents with    Diabetes Mellitus     PCP  (MICHAEL Nunez 08/10/2022)    Nail Care     Current shoe gear:  rx shoes, worn    Hemoglobin A1C   Date Value Ref Range Status   04/01/2022 8.3 (H) 4.0 - 5.6 % Final     Comment:     ADA Screening Guidelines:  5.7-6.4%  Consistent with prediabetes  >or=6.5%  Consistent with diabetes    High levels of fetal hemoglobin interfere with the HbA1C  assay. Heterozygous hemoglobin variants (HbS, HgC, etc)do  not significantly interfere with this assay.   However, presence of multiple variants may affect accuracy.     12/01/2021 8.4 (H) 4.0 - 5.6 % Final     Comment:     [facility]   06/01/2021 8.2 (H) 4.0 - 5.6 % Final     Comment:     [facility]   02/19/2021 9.4 (H) 4.0 - 5.6 % Final     Comment:     [facility]     Patient Active Problem List   Diagnosis    Major depressive disorder, recurrent episode, mild    Benign hypertension with chronic kidney disease, stage III    Type 2 diabetes, uncontrolled, with retinopathy    Coronary artery disease    S/P CABG x 2    Macular degeneration    Obstructive sleep  "apnea treated with BiPAP    Anxiety state, unspecified    Insulin long-term use    Primary osteoarthritis of both knees    Chronic low back pain    DJD (degenerative joint disease), lumbar    Type 2 diabetes, uncontrolled, with neuropathy    Bilateral carotid artery disease    Hyperlipidemia LDL goal <100    Type 2 diabetes, uncontrolled, with peripheral circulatory disorder    COPD with chronic bronchitis and emphysema    Atherosclerosis of abdominal aorta    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    Overweight (BMI 25.0-29.9)    Persistent atrial fibrillation    Chronic stable angina    Senile purpura    Osteoarthritis of carpometacarpal (CMC) joint of left thumb    MCI (mild cognitive impairment)    Chronic vertigo    Pulmonary nodule    Dyspnea on exertion    Chronic combined systolic and diastolic heart failure    Ambulates with cane    ILD (interstitial lung disease)    History of cardioversion    Erectile dysfunction    Secondary hyperparathyroidism of renal origin    Noncompliance with medication regimen    Exudative age-related macular degeneration, right eye, with active choroidal neovascularization    History of stroke    Chronic tension-type headache, intractable    Drug-induced immunodeficiency     Current Outpatient Medications on File Prior to Visit   Medication Sig Dispense Refill    acetaminophen (TYLENOL) 325 MG tablet Take 2 tablets (650 mg total) by mouth every 6 (six) hours as needed. 13 tablet 0    albuterol (PROVENTIL/VENTOLIN HFA) 90 mcg/actuation inhaler INHALE 2 PUFFS BY MOUTH EVERY 6 HOURS AS NEEDED FOR WHEEZING OR  SHORTNESS  OF  BREATH 54 g 0    atorvastatin (LIPITOR) 40 MG tablet Take 40 mg by mouth once daily.      BD INSULIN PEN NEEDLE UF SHORT 31 gauge x 5/16" Ndle       BD INSULIN SYRINGE ULTRA-FINE 1/2 mL 31 gauge x 15/64" Syrg       blood sugar diagnostic Strp To check BG 3 times daily, to use with " "insurance preferred meter 200 strip 11    carvedilol (COREG) 25 MG tablet Take 1 tablet (25 mg total) by mouth 2 (two) times daily. 180 tablet 0    ceramides 1,3,6-II (CERAVE DAILY MOISTURIZING) Lotn Apply twice daily to skin 355 mL 1    cinnamon bark 500 mg capsule Take 1,000 mg by mouth once daily.        clopidogreL (PLAVIX) 75 mg tablet Take 75 mg by mouth.      diclofenac sodium (VOLTAREN) 1 % Gel Apply 2 g topically 2 (two) times daily. 100 g 0    diclofenac sodium (VOLTAREN) 1 % Gel Apply 2 g topically 3 (three) times daily. 50 g 0    donepeziL (ARICEPT) 10 MG tablet Take 1 tablet (10 mg total) by mouth every evening. Take one half tablet for one week then as prescribed. 30 tablet 11    ENTRESTO  mg per tablet Take 1 tablet by mouth 2 (two) times daily.      fluticasone propionate (FLONASE) 50 mcg/actuation nasal spray 1 spray (50 mcg total) by Each Nostril route 2 (two) times daily. 18.2 mL 3    furosemide (LASIX) 40 MG tablet Take 40 mg by mouth once daily.      gabapentin (NEURONTIN) 300 MG capsule Take 4 capsules (1,200 mg total) by mouth 2 (two) times daily. (Patient taking differently: Take 900 mg by mouth 2 (two) times daily.) 540 capsule 1    glimepiride (AMARYL) 4 MG tablet Take 4 mg by mouth daily with breakfast.       HYDROcodone-acetaminophen (NORCO) 5-325 mg per tablet Take 1 tablet by mouth every 4 (four) hours as needed for Pain. Causes drowsiness. Do not drive or operate machinery with this medication. Do not drink alcohol with this medication. Causes constipation. Take with stool softener. 10 tablet 0    hydrOXYzine HCL (ATARAX) 25 MG tablet Take 1 tablet (25 mg total) by mouth 3 (three) times daily as needed for Itching or Anxiety. 90 tablet 3    insulin NPH-insulin regular, 70/30, (NOVOLIN 70/30) 100 unit/mL (70-30) injection Inject into the skin. Inject 10 units at breakfast and inject 10 units at night      insulin syringe-needle U-100 0.3 mL 31 gauge x 15/64" Syrg " "USE TO INJECT INSULIN THREE TIMES DAILY      insulin syringe-needle U-100 1/2 mL 31 x 5/16" Syrg       isosorbide mononitrate (IMDUR) 30 MG 24 hr tablet Take 30 mg by mouth once daily.      lancets Misc To check BG 3 times daily, to use with insurance preferred meter 200 each 11    LIDOcaine (LIDODERM) 5 % Place 1 patch onto the skin once daily. Remove & Discard patch within 12 hours or as directed by MD 5 patch 1    metformin (GLUCOPHAGE) 500 MG tablet Take 1,000 mg by mouth 2 (two) times daily with meals. 2 Tablets in the morning, 2 Tablets in the evening      metoclopramide HCl (REGLAN) 5 MG tablet Take 5 mg by mouth 3 (three) times daily as needed.      nitroGLYCERIN (NITROSTAT) 0.4 MG SL tablet DISSOLVE 1 TABLET UNDER THE TONGUE EVERY 5 MINUTES AS  NEEDED FOR CHEST PAIN. MAX  OF 3 TABLETS IN 15 MINUTES. CALL 911 IF PAIN PERSISTS.      pravastatin (PRAVACHOL) 20 MG tablet Take 1 tablet (20 mg total) by mouth once daily. 90 tablet 1    spironolactone (ALDACTONE) 25 MG tablet Take 25 mg by mouth.      VIT C/VIT E AC/LUT/COPPER/ZINC (PRESERVISION LUTEIN ORAL) Take by mouth.      warfarin (COUMADIN) 5 MG tablet Take 2 tabs by mouth on Tuesday,Thursday, Saturday and Sundays then 1.5 on all other days.      blood-glucose meter kit To check BG 3 times daily, to use with insurance preferred meter 1 each 0    diazePAM (VALIUM) 2 MG tablet Take 1 tablet (2 mg total) by mouth every 12 (twelve) hours as needed for Anxiety (Dizziness). 20 tablet 0    DULoxetine (CYMBALTA) 60 MG capsule Take 1 capsule (60 mg total) by mouth once daily. 90 capsule 3     No current facility-administered medications on file prior to visit.     Review of patient's allergies indicates:   Allergen Reactions    Codeine Nausea Only     weak     Past Surgical History:   Procedure Laterality Date    broken elbow      left    COLONOSCOPY N/A 10/4/2017    Procedure: COLONOSCOPY;  Surgeon: Gabriel Leung MD;  Location: [facility] ENDO;  " "Service: Endoscopy;  Laterality: N/A;    EYE SURGERY      cataract    heart bypass      2004    HERNIA REPAIR      right    ROTATOR CUFF REPAIR      right  2004     Family History   Problem Relation Age of Onset    Arthritis Mother     Diabetes Mother     Stroke Mother     Heart attack Father     Cancer Brother     Throat cancer Brother     Diabetes Maternal Aunt     Diabetes Maternal Uncle     Heart disease Maternal Uncle     Diabetes Paternal Aunt     Heart disease Paternal Aunt     Heart disease Paternal Uncle     Heart disease Maternal Grandmother     Cancer Maternal Grandfather      Social History     Socioeconomic History    Marital status:     Number of children: 4    Years of education: GED   Occupational History    Occupation: retired tug    Tobacco Use    Smoking status: Former Smoker     Packs/day: 3.00     Years: 45.00     Pack years: 135.00     Types: Cigarettes     Quit date: 2004     Years since quittin.3    Smokeless tobacco: Never Used   Substance and Sexual Activity    Alcohol use: Yes     Comment: was heavy drinker 35 years ago, rare use now    Drug use: No    Sexual activity: Yes     Partners: Female     Review of Systems   Constitution: Negative for chills, fever and weakness.   Cardiovascular: Negative for claudication and leg swelling.   Respiratory: Positive for cough. Negative for shortness of breath.    Skin: Positive for dry skin and nail changes. Negative for itching and rash.   Musculoskeletal: Positive for arthritis, back pain and joint pain. Negative for falls, joint swelling and muscle weakness.   Gastrointestinal: Negative for diarrhea, nausea and vomiting.   Neurological: Positive for numbness and paresthesias. Negative for tremors.   Psychiatric/Behavioral: Negative for altered mental status and hallucinations.           Objective:       Vitals:    22 0944   Weight: 83.5 kg (184 lb)   Height: 5' 8" (1.727 m)   PainSc:   3 "   PainLoc: Toe       Physical Exam   Constitutional:   General: Pt. is well-developed, well-nourished, appears stated age, in no acute distress, alert and oriented x 3. No evidence of depression, anxiety, or agitation. Calm, cooperative, and communicative. Appropriate interactions and affect.       Cardiovascular:   Pulses:       Dorsalis pedis pulses are 2+ on the right side, and 2+ on the left side.        Posterior tibial pulses are 1+ on the right side, and 1+ on the left side.   There is decreased digital hair.   Musculoskeletal:        Right foot: There is normal range of motion.        Left foot:  There is normal range of motion.   Muscle strength is 5/5 in all groups bilaterally.    Decreased stride, station of gait.  apropulsive toe off.  Increased angle and base of gait.    Patient has hammertoes of digits 2-5 bilateral partially reducible without symptom today.    Visible and palpable bunion without pain at dorsomedial 1st metatarsal head right and left.  Hallux abducted right and left partially reducible, tracks laterally without being track bound.  No ecchymosis, erythema, edema, or cardinal signs infection or signs of trauma same foot.     Neurological: A sensory deficit is present.   Exeter-Keon 5.07 monofilamant testing is diminished Farhad feet. Sharp/dull sensation diminished Bilaterally   Skin: Skin is warm, dry. No abrasion, no ecchymosis, no rash noted. He is not diaphoretic. No cyanosis. No pallor. Nails show no clubbing.   Medial and lateral hallux nail margin of right foot with ingrown nail plate and thick HPK. Surrounding erythema and minimal edema is noted there is no granuloma formation noted. no malodor     Toenails 1-5 bilaterally are elongated by 2-3 mm, thickened by 2-3 mm, discolored/yellowed, dystrophic, brittle with subungual debris.    Focal hyperkeratotic lesion consisting entirely of hyperkeratotic tissue without open skin, drainage, pus, fluctuance, malodor, or signs of  infection: medial IPJ of hallux concha    Interdigital Spaces clean, dry and without evidence of break in skin integrity   Psychiatric: He has a normal mood and affect. His speech is normal.   Nursing note and vitals reviewed.        Assessment:       Encounter Diagnoses   Name Primary?    Type 2 diabetes, uncontrolled, with peripheral circulatory disorder Yes    Type II diabetes mellitus with neurological manifestations     Hammer toes of both feet     Hallux abducto valgus, left     Hallux abducto valgus, right     Onychomycosis due to dermatophyte     Ingrown nail     Corn or callus          Plan:       Percy was seen today for diabetes mellitus and nail care.    Diagnoses and all orders for this visit:    Type 2 diabetes, uncontrolled, with peripheral circulatory disorder    Type II diabetes mellitus with neurological manifestations    Hammer toes of both feet    Hallux abducto valgus, left    Hallux abducto valgus, right    Onychomycosis due to dermatophyte    Ingrown nail    Corn or callus      I counseled the patient on his conditions, their implications and medical management.      - Shoe inspection. Diabetic Foot Education. Patient reminded of the importance of good nutrition and blood sugar control to help prevent podiatric complications of diabetes. Patient instructed on proper foot hygeine. We discussed wearing proper shoe gear, daily foot inspections, never walking without protective shoe gear, never putting sharp instruments to feet    - With patient's permission, nails were aggressively reduced and debrided x 10 to their soft tissue attachment mechanically and with electric , removing all offending nail and debris. Patient relates relief following the procedure. Utilizing sterile toenail clippers I aggressively trimmed  the offending right hallux  nail border approximately 3 mm from its edge and carried the nail plate incision down at an angle in order to wedge out the offending cryptotic  portion of the nail plate. The offending border was then removed in toto. The remaining nail was grinded down with an electric  down to nail bed.  Silver nitrate to any abrasions. Patient tolerated the procedure well and related significant relief.    - After cleansing the  area w/ alcohol prep pad the above mentioned hyperkeratosis was trimmed utilizing No 15 scapel, to a smooth base with out incident. Patient tolerated this  well and reported comfort to the area of medial hallux IPJ concha    - He will continue to monitor the areas daily, inspect his feet, wear protective shoe gear when ambulatory, moisturizer to maintain skin integrity and follow in this office in approximately 2-3 months, sooner PRN

## 2022-08-18 ENCOUNTER — TELEPHONE (OUTPATIENT)
Dept: PHYSICAL MEDICINE AND REHAB | Facility: CLINIC | Age: 82
End: 2022-08-18
Payer: MEDICARE

## 2022-08-18 NOTE — TELEPHONE ENCOUNTER
----- Message from Sonia Segura sent at 8/18/2022 11:40 AM CDT -----  Andria calling in regards to orders for a mobility device , an acessement was faxed , please fill out and fax back       Confirmed patient's contact info below:  Contact Name: andria   Phone Number:[phone]  fax [phone]

## 2022-08-25 ENCOUNTER — TELEPHONE (OUTPATIENT)
Dept: OTOLARYNGOLOGY | Facility: CLINIC | Age: 82
End: 2022-08-25
Payer: MEDICARE

## 2022-08-25 ENCOUNTER — PATIENT MESSAGE (OUTPATIENT)
Dept: PULMONOLOGY | Facility: CLINIC | Age: 82
End: 2022-08-25
Payer: MEDICARE

## 2022-08-25 NOTE — TELEPHONE ENCOUNTER
----- Message from Rosie Su sent at 8/25/2022  4:01 PM CDT -----  Type:  Sooner Appointment Request    Patient is requesting a sooner appointment.  Patient declined first available appointment listed as well as another facility and provider .  Patient will not accept being placed on the waitlist and is requesting a message be sent to doctor.    Name of Caller: self     When is the first available appointment? N/a     Symptoms: Feels like his tongue is burnt/ raw     Would the patient rather a call back or a response via [facility]? Call back     Best Call Back Number: [phone]

## 2022-08-25 NOTE — TELEPHONE ENCOUNTER
Called patient to schedule appt. Notified him that we don't have anything soon. patient asked me if I can call him back tomorrow because he is doing something important right now. Advised patient that he can call us back tomorrow to schedule an appt.

## 2022-08-26 ENCOUNTER — TELEPHONE (OUTPATIENT)
Dept: OTOLARYNGOLOGY | Facility: CLINIC | Age: 82
End: 2022-08-26
Payer: MEDICARE

## 2022-08-26 NOTE — TELEPHONE ENCOUNTER
Spoke with patient in regards of the tip of his tongue feeling burnt. Advised him not to brush it and may have to follow up with urgent care or pcp until he get sin with our office.He will try halls breezers and Xylitol gum for the dry mouth. Also scheduled him an appt for 11/11 with dr álvarez if the tongue doesn't get better.

## 2022-08-26 NOTE — TELEPHONE ENCOUNTER
----- Message from Marquita Reynaga sent at 8/26/2022 12:38 PM CDT -----  Type:  Patient Returning Call    Who Called self    Who Left Message for Patient: Alena    Does the patient know what this is regarding?: yes    Would the patient rather a call back or a response via [facility]?  call    Best Call Back Number: .[phone] (home)

## 2022-08-29 ENCOUNTER — HOSPITAL ENCOUNTER (OUTPATIENT)
Dept: RESPIRATORY THERAPY | Facility: HOSPITAL | Age: 82
Discharge: HOME | End: 2022-08-29
Attending: NURSE PRACTITIONER
Payer: MEDICARE

## 2022-08-29 ENCOUNTER — HOSPITAL ENCOUNTER (OUTPATIENT)
Dept: RADIOLOGY | Facility: HOSPITAL | Age: 82
Discharge: HOME | End: 2022-08-29
Attending: NURSE PRACTITIONER
Payer: MEDICARE

## 2022-08-29 ENCOUNTER — TELEPHONE (OUTPATIENT)
Dept: OTOLARYNGOLOGY | Facility: CLINIC | Age: 82
End: 2022-08-29
Payer: MEDICARE

## 2022-08-29 VITALS — RESPIRATION RATE: 18 BRPM | OXYGEN SATURATION: 99 % | HEART RATE: 71 BPM

## 2022-08-29 DIAGNOSIS — J18.1 CONSOLIDATION OF LEFT LOWER LOBE OF LUNG: ICD-10-CM

## 2022-08-29 DIAGNOSIS — J84.9 ILD (INTERSTITIAL LUNG DISEASE): ICD-10-CM

## 2022-08-29 DIAGNOSIS — J43.9 COPD WITH CHRONIC BRONCHITIS AND EMPHYSEMA: ICD-10-CM

## 2022-08-29 DIAGNOSIS — J44.89 COPD WITH CHRONIC BRONCHITIS AND EMPHYSEMA: ICD-10-CM

## 2022-08-29 LAB
BRPFT: NORMAL
DLCO ADJ PRE: 7.55 ML/(MIN*MMHG)
DLCO SINGLE BREATH LLN: 16.89
DLCO SINGLE BREATH PRE REF: 31.7 %
DLCO SINGLE BREATH REF: 23.82
DLCOC SBVA LLN: 2.34
DLCOC SBVA PRE REF: 53.7 %
DLCOC SBVA REF: 3.53
DLCOC SINGLE BREATH LLN: 16.89
DLCOC SINGLE BREATH PRE REF: 31.7 %
DLCOC SINGLE BREATH REF: 23.82
DLCOVA LLN: 2.34
DLCOVA PRE REF: 53.7 %
DLCOVA PRE: 1.9 ML/(MIN*MMHG*L)
DLCOVA REF: 3.53
DLVAADJ PRE: 1.9 ML/(MIN*MMHG*L)
ERVN2 LLN: -16449.11
ERVN2 PRE REF: 42.5 %
ERVN2 PRE: 0.38 L
ERVN2 REF: 0.89
FEF 25 75 CHG: 11.2 %
FEF 25 75 LLN: 0.74
FEF 25 75 POST REF: 18.4 %
FEF 25 75 PRE REF: 16.5 %
FEF 25 75 REF: 1.94
FET100 CHG: 1 %
FEV1 CHG: -8.3 %
FEV1 FVC CHG: -2.5 %
FEV1 FVC LLN: 60
FEV1 FVC POST REF: 62.5 %
FEV1 FVC PRE REF: 64.1 %
FEV1 FVC REF: 75
FEV1 LLN: 1.88
FEV1 POST REF: 48.1 %
FEV1 PRE REF: 52.4 %
FEV1 REF: 2.71
FRCN2 LLN: 2.68
FRCN2 PRE REF: 72.2 %
FRCN2 REF: 3.67
FVC CHG: -6 %
FVC LLN: 2.63
FVC POST REF: 76.3 %
FVC PRE REF: 81.1 %
FVC REF: 3.65
IVC PRE: 2.59 L
IVC SINGLE BREATH LLN: 2.63
IVC SINGLE BREATH PRE REF: 71.1 %
IVC SINGLE BREATH REF: 3.65
PEF CHG: -23.3 %
PEF LLN: 4.75
PEF POST REF: 42.8 %
PEF PRE REF: 55.7 %
PEF REF: 6.94
POST FEF 25 75: 0.36 L/S
POST FET 100: 14.93 SEC
POST FEV1 FVC: 46.91 %
POST FEV1: 1.3 L
POST FVC: 2.78 L
POST PEF: 2.97 L/S
PRE DLCO: 7.55 ML/(MIN*MMHG)
PRE FEF 25 75: 0.32 L/S
PRE FET 100: 14.78 SEC
PRE FEV1 FVC: 48.09 %
PRE FEV1: 1.42 L
PRE FRC N2: 2.65 L
PRE FVC: 2.96 L
PRE PEF: 3.87 L/S
RVN2 LLN: 2.1
RVN2 PRE REF: 81.8 %
RVN2 PRE: 2.27 L
RVN2 REF: 2.77
RVN2TLCN2 LLN: 35.79
RVN2TLCN2 PRE REF: 103.1 %
RVN2TLCN2 PRE: 46.14 %
RVN2TLCN2 REF: 44.77
TLCN2 LLN: 5.59
TLCN2 PRE REF: 73 %
TLCN2 PRE: 4.92 L
TLCN2 REF: 6.74
VA PRE: 3.98 L
VA SINGLE BREATH LLN: 6.59
VA SINGLE BREATH PRE REF: 60.4 %
VA SINGLE BREATH REF: 6.59
VCMAXN2 LLN: 2.63
VCMAXN2 PRE REF: 72.6 %
VCMAXN2 PRE: 2.65 L
VCMAXN2 REF: 3.65

## 2022-08-29 PROCEDURE — 94060 PR EVAL OF BRONCHOSPASM: ICD-10-PCS | Mod: 26,,, | Performed by: INTERNAL MEDICINE

## 2022-08-29 PROCEDURE — 71250 CT THORAX DX C-: CPT | Mod: TC

## 2022-08-29 PROCEDURE — 94060 EVALUATION OF WHEEZING: CPT | Mod: 26,,, | Performed by: INTERNAL MEDICINE

## 2022-08-29 PROCEDURE — 94727 PR PULM FUNCTION TEST BY GAS: ICD-10-PCS | Mod: 26,,, | Performed by: INTERNAL MEDICINE

## 2022-08-29 PROCEDURE — 71250 CT CHEST WITHOUT CONTRAST: ICD-10-PCS | Mod: 26,,, | Performed by: RADIOLOGY

## 2022-08-29 PROCEDURE — 25000242 PHARM REV CODE 250 ALT 637 W/ HCPCS: Performed by: NURSE PRACTITIONER

## 2022-08-29 PROCEDURE — 94010 BREATHING CAPACITY TEST: CPT

## 2022-08-29 PROCEDURE — 94729 DIFFUSING CAPACITY: CPT

## 2022-08-29 PROCEDURE — 94727 GAS DIL/WSHOT DETER LNG VOL: CPT

## 2022-08-29 PROCEDURE — 94729 PR C02/MEMBANE DIFFUSE CAPACITY: ICD-10-PCS | Mod: 26,,, | Performed by: INTERNAL MEDICINE

## 2022-08-29 PROCEDURE — 94729 DIFFUSING CAPACITY: CPT | Mod: 26,,, | Performed by: INTERNAL MEDICINE

## 2022-08-29 PROCEDURE — 71250 CT THORAX DX C-: CPT | Mod: 26,,, | Performed by: RADIOLOGY

## 2022-08-29 PROCEDURE — 94727 GAS DIL/WSHOT DETER LNG VOL: CPT | Mod: 26,,, | Performed by: INTERNAL MEDICINE

## 2022-08-29 RX ORDER — ALBUTEROL SULFATE 2.5 MG/.5ML
2.5 SOLUTION RESPIRATORY (INHALATION) ONCE
Status: COMPLETED | OUTPATIENT
Start: 2022-08-29 | End: 2022-08-29

## 2022-08-29 RX ADMIN — ALBUTEROL SULFATE 2.5 MG: 2.5 SOLUTION RESPIRATORY (INHALATION) at 10:08

## 2022-08-29 NOTE — TELEPHONE ENCOUNTER
----- Message from Irina Nunez sent at 8/29/2022  2:10 PM CDT -----  Type: Patient Call Back    Who called: Self     What is the request in detail: patient says he think he figured out why his touch is raw and causing issues, he says he is always rubbing his tub by his dentures to get the food from under his dentures and tongue. Please dave     Can the clinic reply by [facility]? No     Would the patient rather a call back or a response via [facility]? Call     Best call back number:.[phone] (home)

## 2022-08-29 NOTE — TELEPHONE ENCOUNTER
Spoke with patient and advised him to reach out to the denture specialist. Also to take his teeth out when he can, to let the tongue rest

## 2022-08-30 LAB
CHOL/HDLC RATIO: 3
CHOLESTEROL, TOTAL: 90
CREATININE RANDOM URINE: 65 MG/DL (ref 20–320)
EGFR: 56 ML/MIN/1.73M2
HBA1C MFR BLD: 8.1 % (ref 4–5.6)
HDLC SERPL-MCNC: 30 MG/DL
LDLC SERPL CALC-MCNC: 45 MG/DL
MICROALBUMIN URINE RANDOM: 6.4
MICROALBUMIN/CREATININE RATIO: 98 UG/MG
NONHDLC SERPL-MCNC: 60 MG/DL
TRIGL SERPL-MCNC: 66 MG/DL

## 2022-08-31 ENCOUNTER — TELEPHONE (OUTPATIENT)
Dept: PULMONOLOGY | Facility: CLINIC | Age: 82
End: 2022-08-31
Payer: MEDICARE

## 2022-08-31 NOTE — TELEPHONE ENCOUNTER
Told patient when he comes in for his appt with Gloria I will send off his paperwork.                    ----- Message from Mor Li MA sent at 8/31/2022  1:07 PM CDT -----  Suri Castro Staff  Caller: Unspecified (Today, 12:42 PM)  Name of Who is Calling:JAILENE JIMENES [[id number]]           What is the request in detail: Patient is  requesting a call back           Can the clinic reply by [facility]: NO

## 2022-09-20 ENCOUNTER — TELEPHONE (OUTPATIENT)
Dept: FAMILY MEDICINE | Facility: CLINIC | Age: 82
End: 2022-09-20
Payer: MEDICARE

## 2022-09-20 DIAGNOSIS — L29.9 ITCHING: Primary | ICD-10-CM

## 2022-09-20 NOTE — TELEPHONE ENCOUNTER
----- Message from Ferny Watson sent at 9/20/2022  9:38 AM CDT -----  Type: Patient Call Back    Who called:Self    What is the request in detail: Pt is requesting a call back regarding a sickness he's having.    Can the clinic reply by [facility]? no    Would the patient rather a call back or a response via [facility]? Call back    Best call back number: [phone] ([place])       Additional Information:

## 2022-09-20 NOTE — TELEPHONE ENCOUNTER
Patient states his arm, hand and shoulder itches. Patient states he have been putting cerave on it. Patient states he brought that medication from Monday. Patient states he have been itching for longer than a month. Patient states he is tired of scratching states he has sores on him from all the scratching. Patient states he is going crazy from all the itching. Patient states he wake up at night scratching. Patient states he doesn't think it shingles. Patient states do need he to go to an dermatology or should he make an appointment with PCP. Patient states he saw NP Juan Nunez on 08/10 and he prescribed two medications for the itching. Patient states he still is itching and need help  Please advise

## 2022-09-22 ENCOUNTER — PATIENT OUTREACH (OUTPATIENT)
Dept: ADMINISTRATIVE | Facility: HOSPITAL | Age: 82
End: 2022-09-22
Payer: MEDICARE

## 2022-09-22 NOTE — PROGRESS NOTES
Labs updated.    Diabetic Eye Exam - A request was sent today for patient's record.       Last seen: 07/16/2021  Follow up appointment: None scheduled  Last filled: 09/29/2021 Qty-45g Refill-0      Refilled per MD protocol

## 2022-09-22 NOTE — LETTER
AUTHORIZATION FOR RELEASE OF   CONFIDENTIAL INFORMATION    [facility] [place] [facility]    We are seeing Percy Ramirez, date of birth 1940, in the clinic at [facility] PEDS. Kasie Edmondson MD is the patient's PCP. Percy Ramirez has an outstanding lab/procedure at the time we reviewed his chart. In order to help keep his health information updated, he has authorized us to request the following medical record(s):        (  )  MAMMOGRAM                                      (  )  COLONOSCOPY      (  )  PAP SMEAR                                          (  )  OUTSIDE LAB RESULTS     (  )  DEXA SCAN                                          ( x )  EYE EXAM(diabetic) [id number]            (  )  FOOT EXAM                                          (  )  ENTIRE RECORD     (  )  OUTSIDE IMMUNIZATIONS                 (  )  _______________         Please fax records to [facility], Laura A Nicos[place], MD,  [phone].     If you have any questions, please contact Remedios Madison LPN [facility] at   [phone].       Patient Name: Percy Ramirez  : 1940  Patient Phone #: [phone]

## 2022-09-30 ENCOUNTER — PATIENT MESSAGE (OUTPATIENT)
Dept: FAMILY MEDICINE | Facility: CLINIC | Age: 82
End: 2022-09-30
Payer: MEDICARE

## 2022-10-04 ENCOUNTER — OFFICE VISIT (OUTPATIENT)
Dept: PULMONOLOGY | Facility: CLINIC | Age: 82
End: 2022-10-04
Payer: MEDICARE

## 2022-10-04 VITALS
DIASTOLIC BLOOD PRESSURE: 71 MMHG | TEMPERATURE: 99 F | BODY MASS INDEX: 28.28 KG/M2 | SYSTOLIC BLOOD PRESSURE: 117 MMHG | HEART RATE: 73 BPM | OXYGEN SATURATION: 89 % | WEIGHT: 186.63 LBS | HEIGHT: 68 IN

## 2022-10-04 DIAGNOSIS — J44.89 COPD WITH CHRONIC BRONCHITIS AND EMPHYSEMA: Primary | ICD-10-CM

## 2022-10-04 DIAGNOSIS — J43.9 COPD WITH CHRONIC BRONCHITIS AND EMPHYSEMA: Primary | ICD-10-CM

## 2022-10-04 DIAGNOSIS — J84.9 ILD (INTERSTITIAL LUNG DISEASE): ICD-10-CM

## 2022-10-04 PROCEDURE — 1125F AMNT PAIN NOTED PAIN PRSNT: CPT | Mod: CPTII,S$GLB,, | Performed by: NURSE PRACTITIONER

## 2022-10-04 PROCEDURE — 3074F SYST BP LT 130 MM HG: CPT | Mod: CPTII,S$GLB,, | Performed by: NURSE PRACTITIONER

## 2022-10-04 PROCEDURE — 99999 PR PBB SHADOW E&M-EST. PATIENT-LVL V: ICD-10-PCS | Mod: PBBFAC,,, | Performed by: NURSE PRACTITIONER

## 2022-10-04 PROCEDURE — 1125F PR PAIN SEVERITY QUANTIFIED, PAIN PRESENT: ICD-10-PCS | Mod: CPTII,S$GLB,, | Performed by: NURSE PRACTITIONER

## 2022-10-04 PROCEDURE — 3078F PR MOST RECENT DIASTOLIC BLOOD PRESSURE < 80 MM HG: ICD-10-PCS | Mod: CPTII,S$GLB,, | Performed by: NURSE PRACTITIONER

## 2022-10-04 PROCEDURE — 99999 PR PBB SHADOW E&M-EST. PATIENT-LVL V: CPT | Mod: PBBFAC,,, | Performed by: NURSE PRACTITIONER

## 2022-10-04 PROCEDURE — 1101F PT FALLS ASSESS-DOCD LE1/YR: CPT | Mod: CPTII,S$GLB,, | Performed by: NURSE PRACTITIONER

## 2022-10-04 PROCEDURE — 3288F PR FALLS RISK ASSESSMENT DOCUMENTED: ICD-10-PCS | Mod: CPTII,S$GLB,, | Performed by: NURSE PRACTITIONER

## 2022-10-04 PROCEDURE — 3074F PR MOST RECENT SYSTOLIC BLOOD PRESSURE < 130 MM HG: ICD-10-PCS | Mod: CPTII,S$GLB,, | Performed by: NURSE PRACTITIONER

## 2022-10-04 PROCEDURE — 1159F PR MEDICATION LIST DOCUMENTED IN MEDICAL RECORD: ICD-10-PCS | Mod: CPTII,S$GLB,, | Performed by: NURSE PRACTITIONER

## 2022-10-04 PROCEDURE — 1157F PR ADVANCE CARE PLAN OR EQUIV PRESENT IN MEDICAL RECORD: ICD-10-PCS | Mod: CPTII,S$GLB,, | Performed by: NURSE PRACTITIONER

## 2022-10-04 PROCEDURE — 99214 PR OFFICE/OUTPT VISIT, EST, LEVL IV, 30-39 MIN: ICD-10-PCS | Mod: S$GLB,,, | Performed by: NURSE PRACTITIONER

## 2022-10-04 PROCEDURE — 1157F ADVNC CARE PLAN IN RCRD: CPT | Mod: CPTII,S$GLB,, | Performed by: NURSE PRACTITIONER

## 2022-10-04 PROCEDURE — 99214 OFFICE O/P EST MOD 30 MIN: CPT | Mod: S$GLB,,, | Performed by: NURSE PRACTITIONER

## 2022-10-04 PROCEDURE — 1159F MED LIST DOCD IN RCRD: CPT | Mod: CPTII,S$GLB,, | Performed by: NURSE PRACTITIONER

## 2022-10-04 PROCEDURE — 1101F PR PT FALLS ASSESS DOC 0-1 FALLS W/OUT INJ PAST YR: ICD-10-PCS | Mod: CPTII,S$GLB,, | Performed by: NURSE PRACTITIONER

## 2022-10-04 PROCEDURE — 3078F DIAST BP <80 MM HG: CPT | Mod: CPTII,S$GLB,, | Performed by: NURSE PRACTITIONER

## 2022-10-04 PROCEDURE — 3288F FALL RISK ASSESSMENT DOCD: CPT | Mod: CPTII,S$GLB,, | Performed by: NURSE PRACTITIONER

## 2022-10-04 NOTE — PROGRESS NOTES
"HISTORY OF PRESENT ILLNESS: Percy Ramirez is a 81 y.o. male former smoker /o 3 ppd x 40 years.  Quit smoking 2004with copd, cad hx cabg, CHF, tyler on bipap, htn, dyslipidemia, dm2, "irregular heart beat" per pt follow by outside cardiologist  is here for follow up test results.     Patient with dyspnea on exertion. Patient was provided prescription for trelegy.  Breathing is better with trelegy but copay is 45$ and not affordable per pt.  No coughing or wheezing.        Patient is watching his salt intake to manage his shortness of breath.  Shortness of breath on exertion is improved if he cuts down on salt intake.       Patient is able to manage all activities of daily living independently and has no hospital exacerbation. He reports no flare up on interim. He is not very mobile die to hip pains.  He uses albuterol as needed.  He was on oxygen in past but turned this back in,  Walked in clinic at visit: baseline SpO2 93%, desaturation 84% with ambulation corrected to 97% on 2 L NC. Pt reports symptom improvement with supplemental oxygen    PFT 5/15/20 ratio of 54%; fev1 1.60 (57%); fvc 80%; tlc 77%; dlco 34%  6 min 5/15/20 165 96-89-91 on room  PFT 08/29/2022 ratio 48% FEV1 1.42 (52%) FVC 2.96 (81) TLC 73% DLCO 31%     Split night study from  3/21/2017: AHI 31.7, Effective control acieved with cpap 14 cm H2O  CPAP titration study 8/17/2019:  Effective control of sleep disordered breathing was achieved with BPAP at 16/10 cm of water.   Pt on bipap 16/10 cflex 3 using nightly and feeling rested on bipap.   [employer]  [id number]  Replacement issued 4/15/2022  Compliance Summary  4/15/2022 - 7/13/2022 (90 days)  Days with Device Usage 90 days  Days without Device Usage 0 days  Percent Days with Device Usage 100.0%  Cumulative Usage 27 days 6 hrs. 30 mins. 59 secs.  Maximum Usage (1 Day) 11 hrs. 12 mins. 47 secs.  Average Usage (All Days) 7 hrs. 16 mins. 20 secs.  Average Usage (Days Used) 7 hrs. 16 mins. 20 " secs.  Minimum Usage (1 Day) 1 hrs. 13 mins. 26 secs.  Percent of Days with Usage >= 4 Hours 94.4%  Percent of Days with Usage < 4 Hours 5.6%  Date Range  Total Blower Time 27 days 7 hrs. 50 mins. 38 secs.  Bi-Level Summary  Average Time in Large Leak Per Day 32 mins. 1 secs.  Average AHI 5.1  EPAP 10.0 cmH2O  IPAP 16.0 cmH2O  Interrogation 10/4/2022:  BIPAP 16/10: Mask fit 66% residual AHI 9.2 periodic breathing 7%    PAST MEDICAL HISTORY:    Active Ambulatory Problems     Diagnosis Date Noted    Major depressive disorder, recurrent episode, mild     Benign hypertension with chronic kidney disease, stage III     Type 2 diabetes, uncontrolled, with retinopathy     Coronary artery disease     S/P CABG x 2     Macular degeneration     Obstructive sleep apnea treated with BiPAP 07/27/2012    Anxiety state, unspecified 10/24/2013    Insulin long-term use 02/16/2015    Primary osteoarthritis of both knees 02/25/2015    Chronic low back pain 02/25/2015    DJD (degenerative joint disease), lumbar 04/30/2015    Type 2 diabetes, uncontrolled, with neuropathy 07/29/2016    Bilateral carotid artery disease 07/29/2016    Hyperlipidemia LDL goal <100 07/29/2016    Type 2 diabetes, uncontrolled, with peripheral circulatory disorder 07/29/2016    COPD with chronic bronchitis and emphysema 07/29/2016    Atherosclerosis of abdominal aorta 07/29/2016    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 07/29/2016    Overweight (BMI 25.0-29.9) 07/29/2016    Persistent atrial fibrillation 03/06/2017    Chronic stable angina 03/06/2017    Senile purpura 03/06/2017    Osteoarthritis of carpometacarpal (CMC) joint of left thumb 11/22/2017    MCI (mild cognitive impairment) 06/18/2018    Chronic vertigo 02/11/2019    Pulmonary nodule 03/20/2019    Dyspnea on exertion 03/20/2019    Chronic combined systolic and diastolic heart failure 11/30/2018    Ambulates with cane 11/06/2019    ILD (interstitial lung disease)  05/12/2020    History of cardioversion 09/23/2020    Erectile dysfunction 01/24/2020    Secondary hyperparathyroidism of renal origin 11/06/2020    Noncompliance with medication regimen 11/06/2020    Exudative age-related macular degeneration, right eye, with active choroidal neovascularization 03/29/2022    History of stroke 04/04/2022    Chronic tension-type headache, intractable 04/08/2022    Drug-induced immunodeficiency 08/10/2022     Resolved Ambulatory Problems     Diagnosis Date Noted    Combined hyperlipidemia associated with type 2 diabetes mellitus     Fatigue 07/27/2012    Depressive disorder, not elsewhere classified 10/24/2013    Arrhythmia 12/30/2013    Dizziness of unknown cause 12/30/2013    Hypotension 06/13/2014    Hyponatremia 06/14/2014    Diabetic retinopathy of both eyes 10/23/2014    Diabetic neuropathy 02/25/2015    Poor motor control of trunk 03/11/2015    IT band syndrome 03/11/2015    Impairment of balance 03/11/2015    Colon cancer screening 10/04/2017    Medication intolerance 09/26/2019     Past Medical History:   Diagnosis Date    Allergy     Arthritis     CAD, multiple vessel     Cataract     Depression     History of colon polyps     Hyperlipidemia     Hypertension     Type 2 diabetes mellitus with circulatory disorder                 PAST SURGICAL HISTORY:    Past Surgical History:   Procedure Laterality Date    broken elbow      left    COLONOSCOPY N/A 10/4/2017    Procedure: COLONOSCOPY;  Surgeon: Gabriel Leung MD;  Location: [facility];  Service: Endoscopy;  Laterality: N/A;    EYE SURGERY      cataract    heart bypass      2004    HERNIA REPAIR      right    ROTATOR CUFF REPAIR      right  2004         FAMILY HISTORY:                Family History   Problem Relation Age of Onset    Arthritis Mother     Diabetes Mother     Stroke Mother     Heart attack Father     Cancer Brother     Throat cancer Brother     Diabetes Maternal Aunt     Diabetes Maternal Uncle     Heart  "disease Maternal Uncle     Diabetes Paternal Aunt     Heart disease Paternal Aunt     Heart disease Paternal Uncle     Heart disease Maternal Grandmother     Cancer Maternal Grandfather        SOCIAL HISTORY:          Tobacco:   Social History     Tobacco Use   Smoking Status Former    Packs/day: 3.00    Years: 45.00    Pack years: 135.00    Types: Cigarettes    Quit date: 2004    Years since quittin.4   Smokeless Tobacco Former       alcohol use:    Social History     Substance and Sexual Activity   Alcohol Use Yes    Comment: was heavy drinker 35 years ago, rare use now               Gas from sodas  Occupation:  Retired tug boat captain    ALLERGIES:    Review of patient's allergies indicates:   Allergen Reactions    Aricept odt [donepezil] Diarrhea and Nausea And Vomiting    Codeine Nausea Only     weak    Ranexa [ranolazine]        CURRENT MEDICATIONS:    Current Outpatient Medications   Medication Sig Dispense Refill    acetaminophen (TYLENOL) 325 MG tablet Take 2 tablets (650 mg total) by mouth every 6 (six) hours as needed. 13 tablet 0    albuterol (PROVENTIL/VENTOLIN HFA) 90 mcg/actuation inhaler INHALE 2 PUFFS BY MOUTH EVERY 6 HOURS AS NEEDED FOR WHEEZING OR  SHORTNESS  OF  BREATH 54 g 0    atorvastatin (LIPITOR) 40 MG tablet Take 40 mg by mouth once daily.      BD INSULIN PEN NEEDLE UF SHORT 31 gauge x 5/16" Ndle       BD INSULIN SYRINGE ULTRA-FINE 1/2 mL 31 gauge x 15/64" Syrg       blood sugar diagnostic Strp To check BG 3 times daily, to use with insurance preferred meter 200 strip 11    carvedilol (COREG) 25 MG tablet Take 1 tablet (25 mg total) by mouth 2 (two) times daily. 180 tablet 0    ceramides 1,3,6-II (CERAVE DAILY MOISTURIZING) Lotn Apply twice daily to skin 355 mL 1    cinnamon bark 500 mg capsule Take 1,000 mg by mouth once daily.        clopidogreL (PLAVIX) 75 mg tablet Take 75 mg by mouth.      diclofenac sodium (VOLTAREN) 1 % Gel Apply 2 g topically 2 (two) times daily. 100 g 0 " "   diclofenac sodium (VOLTAREN) 1 % Gel Apply 2 g topically 3 (three) times daily. 50 g 0    donepeziL (ARICEPT) 10 MG tablet Take 1 tablet (10 mg total) by mouth every evening. Take one half tablet for one week then as prescribed. 30 tablet 11    ENTRESTO  mg per tablet Take 1 tablet by mouth 2 (two) times daily.      fluticasone propionate (FLONASE) 50 mcg/actuation nasal spray 1 spray (50 mcg total) by Each Nostril route 2 (two) times daily. 18.2 mL 3    furosemide (LASIX) 40 MG tablet Take 40 mg by mouth once daily.      gabapentin (NEURONTIN) 300 MG capsule Take 4 capsules (1,200 mg total) by mouth 2 (two) times daily. (Patient taking differently: Take 900 mg by mouth 2 (two) times daily.) 540 capsule 1    glimepiride (AMARYL) 4 MG tablet Take 4 mg by mouth daily with breakfast.       HYDROcodone-acetaminophen (NORCO) 5-325 mg per tablet Take 1 tablet by mouth every 4 (four) hours as needed for Pain. Causes drowsiness. Do not drive or operate machinery with this medication. Do not drink alcohol with this medication. Causes constipation. Take with stool softener. 10 tablet 0    hydrOXYzine HCL (ATARAX) 25 MG tablet Take 1 tablet (25 mg total) by mouth 3 (three) times daily as needed for Itching or Anxiety. 90 tablet 3    insulin NPH-insulin regular, 70/30, (NOVOLIN 70/30) 100 unit/mL (70-30) injection Inject into the skin. Inject 10 units at breakfast and inject 10 units at night      insulin syringe-needle U-100 0.3 mL 31 gauge x 15/64" Syrg USE TO INJECT INSULIN THREE TIMES DAILY      insulin syringe-needle U-100 1/2 mL 31 x 5/16" Syrg       isosorbide mononitrate (IMDUR) 30 MG 24 hr tablet Take 30 mg by mouth once daily.      lancets Misc To check BG 3 times daily, to use with insurance preferred meter 200 each 11    LIDOcaine (LIDODERM) 5 % Place 1 patch onto the skin once daily. Remove & Discard patch within 12 hours or as directed by MD Townsend patch 1    metformin (GLUCOPHAGE) 500 MG tablet Take 1,000 mg " by mouth 2 (two) times daily with meals. 2 Tablets in the morning, 2 Tablets in the evening      metoclopramide HCl (REGLAN) 5 MG tablet Take 5 mg by mouth 3 (three) times daily as needed.      nitroGLYCERIN (NITROSTAT) 0.4 MG SL tablet DISSOLVE 1 TABLET UNDER THE TONGUE EVERY 5 MINUTES AS  NEEDED FOR CHEST PAIN. MAX  OF 3 TABLETS IN 15 MINUTES. CALL 911 IF PAIN PERSISTS.      pravastatin (PRAVACHOL) 20 MG tablet Take 1 tablet (20 mg total) by mouth once daily. 90 tablet 1    spironolactone (ALDACTONE) 25 MG tablet Take 25 mg by mouth.      VIT C/VIT E AC/LUT/COPPER/ZINC (PRESERVISION LUTEIN ORAL) Take by mouth.      warfarin (COUMADIN) 5 MG tablet Take 2 tabs by mouth on Tuesday,Thursday, Saturday and Sundays then 1.5 on all other days.      blood-glucose meter kit To check BG 3 times daily, to use with insurance preferred meter 1 each 0    diazePAM (VALIUM) 2 MG tablet Take 1 tablet (2 mg total) by mouth every 12 (twelve) hours as needed for Anxiety (Dizziness). 20 tablet 0    DULoxetine (CYMBALTA) 60 MG capsule Take 1 capsule (60 mg total) by mouth once daily. 90 capsule 3    triamcinolone acetonide 0.1% (KENALOG) 0.1 % cream SMARTSI Application Topical 2-3 Times Daily       No current facility-administered medications for this visit.                  REVIEW OF SYSTEMS:   Salt worsen sob  Review of Systems   Constitutional:  Negative for fever, chills, weight loss, weight gain, activity change, appetite change, fatigue and night sweats.   HENT:  Negative for congestion.    Eyes: Negative.    Respiratory:  Positive for wheezing (afte exertion sometimes), dyspnea on extertion (improve with salt reduction), use of rescue inhaler (albuterol prmn exertion, no maintenance, too expenisive and couldn't tell if helpful) and somnolence (after eating lunch). Negative for apnea, snoring, cough, sputum production, chest tightness, orthopnea and previous hospitialization due to pulmonary problems.         Wearing cpap every  "time  All activity daily living   Cardiovascular:  Positive for leg swelling (imrpve without salt). Negative for chest pain and palpitations.   Genitourinary: Negative.    Endocrine: endocrine negative    Musculoskeletal:  Positive for arthralgias (right hip OA, right and left knee-sees orthopedic) and gait problem.   Skin: Negative.    Gastrointestinal:  Negative for acid reflux.   Neurological:         Off balance due to vertigo, rollator walker, seeing neyrology   Psychiatric/Behavioral:  Negative for sleep disturbance.         Slepping through night  Nap in evening 2-5 alarm set , sometimes go to bed 2 am- oob 6 am, rested on cpap, sometimes cat wake pt up        PHYSICAL EXAM:  Vitals:    10/04/22 1514   BP: 117/71   Pulse: 73   Temp: 98.5 °F (36.9 °C)   SpO2: (!) 89%   Weight: 84.6 kg (186 lb 9.9 oz)   Height: 5' 8" (1.727 m)   PainSc:   4       Body mass index is 28.38 kg/m².     Physical Exam   Constitutional: He is oriented to person, place, and time. He appears well-developed. No distress. He is obese.   HENT:   Head: Normocephalic.   Cardiovascular: Normal rate.   Murmur heard.  Ectopic beat   Pulmonary/Chest: Normal expansion, effort normal and breath sounds normal.   Musculoskeletal:         General: Edema (trace) present.      Cervical back: Neck supple.   Neurological: He is alert and oriented to person, place, and time.   walker   Skin: Skin is warm. He is not diaphoretic.   Psychiatric: He has a normal mood and affect. His behavior is normal. Judgment and thought content normal.       LABS  Pulmonary Functions Testing Results:  1/31/13 tlc 93%; dlco 46%  4/8/14 ratio 67%; fvc 76%; fve 73%  6/14/19 ratio of 48%; fvc 85%; fev1 55%  5/15/20 ratio of 54%; fev1 57%; fvc 80%; tlc 77%; dlco 34%  6 min 5/15/20 165 96-89-91 on room     ABG: none  CXR:   Lungs clear  CT CHEST:  2/15/19 rll ggo and tib in lingula.    5/4/19 (personally reviewed) increased reticulation at bases; similar to prior ct 2/15/19; " +emphysematous changes.      PPD none          Split night study from  3/21/2017: AHI 31.7, Effective control acieved with cpap 14 cm H2O  CPAP titration study 8/17/2019:  Effective control of sleep disordered breathing was achieved with BPAP at 16/10 cm of water.     2-d echo 12/30/11  1.  The right atrium and right ventricle are of normal dimension.            2.  Tricuspid regurgitation is mild.                                         3.  PA pressure 28-30 mmHg.                                                  4.  Enlargement of the left atrium.                                          5.  Normal mitral valve structure and valve excursion.  No mitral            stenosis.                                                                     No MR detected from this study.                                             6.  Normal left ventricular dimensions and systolic function, ejection       fraction 50%-55%.                                                            7.  Grade I left ventricular diastolic dysfunction.                          8.  Mild aortic sclerosis with good valve excursion.  No aortic stenosis.    Minimal aortic regurgitation.                                                9.  No pericardial effusion seen.      bnp 8/5/10 134;1/24/13 164    6 min walk 350 meters; no significant desaturation    ASSESSMENT  Problem List Items Addressed This Visit          Unprioritized    COPD with chronic bronchitis and emphysema - Primary    Overview      Quit smoking since 1960s.   Declining lung function, recommend compliance with trelegy. [employer] application for medication assistance.   Patient is up to date with vaccination.    Declines pulmonary rehab  Agree to resume home oxygen         Relevant Orders    OXYGEN FOR HOME USE    ILD (interstitial lung disease)    Overview     Emphysematous changes on ct along with basilar reticulation.  pft with moderate obstructive physiology and mild restrictive physiology.     Monitor lung function stable restrictive physiology         Relevant Orders    OXYGEN FOR HOME USE      Goals of care - patient does not wish for intubation or cpr in the case of cardiopulmonary arrest.  Living will in [facility].  Daughter is poa.      Patient will Follow up in about 3 months (around 1/4/2023), or if symptoms worsen or fail to improve.

## 2022-10-05 ENCOUNTER — TELEPHONE (OUTPATIENT)
Dept: PULMONOLOGY | Facility: CLINIC | Age: 82
End: 2022-10-05
Payer: MEDICARE

## 2022-10-05 NOTE — TELEPHONE ENCOUNTER
Called [facility] they state that they do not see any such info needed in the notes.                ----- Message from Mor Li MA sent at 10/5/2022 11:51 AM CDT -----  Eugenia Castro Staff  Caller: Unspecified (Today, 11:24 AM)  Type: Patient Call Back     Who called: Self     What is the request in detail: Pt sleeps w/ cpap machine and Pt says [facility] needs this information to be confirmed by a physician so his oxygenator can be connected to his cpap/ bipap     Can the clinic reply by [facility]? no     Would the patient rather a call back or a response via [facility]? Call     Best call back number: [phone]

## 2022-10-06 ENCOUNTER — TELEPHONE (OUTPATIENT)
Dept: FAMILY MEDICINE | Facility: CLINIC | Age: 82
End: 2022-10-06
Payer: MEDICARE

## 2022-10-06 RX ORDER — TRIAMCINOLONE ACETONIDE 1 MG/G
CREAM TOPICAL
COMMUNITY
Start: 2022-10-06

## 2022-10-06 NOTE — TELEPHONE ENCOUNTER
----- Message from Brandon Julien sent at 10/6/2022  2:28 PM CDT -----  Regarding: patient received both pfizer and flu shot today at [facility]  Type: Patient Call Back    Who called:    What is the request in detail: the patient called to notify Dr. Edmondson that he received the  pfizer booster and flu shot at [facility] today. He wanted to let the staff know so that his chart can be updated.    Can the clinic reply by [facility]?no    Would the patient rather a call back or a response via [facility]? Call back    Best call back number:[phone]

## 2022-10-07 ENCOUNTER — TELEPHONE (OUTPATIENT)
Dept: PULMONOLOGY | Facility: CLINIC | Age: 82
End: 2022-10-07
Payer: MEDICARE

## 2022-10-07 DIAGNOSIS — G47.33 OBSTRUCTIVE SLEEP APNEA TREATED WITH BIPAP: Primary | ICD-10-CM

## 2022-10-07 NOTE — TELEPHONE ENCOUNTER
Message sent to Gloria Stinson NP.      TERRY Juares  [place] [facility] [place]  [phone]                       ----- Message from Mor Li MA sent at 10/7/2022  7:19 AM CDT -----  Danitza Castro Staff  Caller: Unspecified (Yesterday,  4:37 PM)  Type: Patient Call Back     Who called: self     What is the request in detail: pt needs the part that connect to oxygen to cpap machine, he is waiting on authorization from provider.     Can the clinic reply by [facility]? no     Would the patient rather a call back or a response via [facility]? Call back     Best call back number: [phone]

## 2022-10-07 NOTE — TELEPHONE ENCOUNTER
Spoke with patient told him Gloria will put the order in.                  ----- Message from Rachel aWlton sent at 10/7/2022  9:29 AM CDT -----  Type: Patient Call Back    Who called:self    What is the request in detail:Pt is requesting an order faxed for oxygenator for cpap sent to [facility]. Pt says when doctor ordered the oxygenator, it was not specified that it was for a cpap. Please call    [facility]    Would the patient rather a call back or a response via [facility]? call    Best call back number:[phone] (home)

## 2022-10-07 NOTE — TELEPHONE ENCOUNTER
----- Message from Mor Li MA sent at 10/7/2022  7:48 AM CDT -----  pt needs the part that connect to oxygen to cpap machine, he is waiting on authorization from provider.     Can the clinic reply by [facility]? no     Would the patient rather a call back or a response via [facility]? Call back     Best call back number: [phone]

## 2022-10-07 NOTE — TELEPHONE ENCOUNTER
Message sent to Gloria Stinson NP.      TERRY Juares  Pulm/Sleep [facility] [place], [place]  [phone]                     ----- Message from Brandon Julien sent at 10/7/2022 12:57 PM CDT -----  Regarding: piece needed to oxygen to cpap machine  Type: Patient Call Back    Who called: Precy     What is the request in detail: the patient is calling to see if NP Scott received the message from earlier. Patient stated that he needs the extra piece to connect the oxygen to cpap machine. He just wants to make sure that it will be delivered with his other equipment on Monday. Please return call at earliest convenience.    Can the clinic reply by [facility]?no    Would the patient rather a call back or a response via [facility]? Call back     Best call back number:[phone]

## 2022-10-12 ENCOUNTER — OFFICE VISIT (OUTPATIENT)
Dept: NEUROLOGY | Facility: CLINIC | Age: 82
End: 2022-10-12
Payer: MEDICARE

## 2022-10-12 VITALS
SYSTOLIC BLOOD PRESSURE: 104 MMHG | HEART RATE: 64 BPM | DIASTOLIC BLOOD PRESSURE: 57 MMHG | WEIGHT: 185.63 LBS | HEIGHT: 68 IN | BODY MASS INDEX: 28.13 KG/M2

## 2022-10-12 DIAGNOSIS — M54.16 LUMBAR RADICULOPATHY: Primary | ICD-10-CM

## 2022-10-12 DIAGNOSIS — M54.16 LUMBAR RADICULOPATHY, CHRONIC: Primary | ICD-10-CM

## 2022-10-12 PROCEDURE — 1101F PR PT FALLS ASSESS DOC 0-1 FALLS W/OUT INJ PAST YR: ICD-10-PCS | Mod: CPTII,S$GLB,, | Performed by: NEUROLOGICAL SURGERY

## 2022-10-12 PROCEDURE — 99214 PR OFFICE/OUTPT VISIT, EST, LEVL IV, 30-39 MIN: ICD-10-PCS | Mod: S$GLB,,, | Performed by: NEUROLOGICAL SURGERY

## 2022-10-12 PROCEDURE — 1101F PT FALLS ASSESS-DOCD LE1/YR: CPT | Mod: CPTII,S$GLB,, | Performed by: NEUROLOGICAL SURGERY

## 2022-10-12 PROCEDURE — 99999 PR PBB SHADOW E&M-EST. PATIENT-LVL V: CPT | Mod: PBBFAC,,, | Performed by: NEUROLOGICAL SURGERY

## 2022-10-12 PROCEDURE — 3078F DIAST BP <80 MM HG: CPT | Mod: CPTII,S$GLB,, | Performed by: NEUROLOGICAL SURGERY

## 2022-10-12 PROCEDURE — 1125F AMNT PAIN NOTED PAIN PRSNT: CPT | Mod: CPTII,S$GLB,, | Performed by: NEUROLOGICAL SURGERY

## 2022-10-12 PROCEDURE — 3078F PR MOST RECENT DIASTOLIC BLOOD PRESSURE < 80 MM HG: ICD-10-PCS | Mod: CPTII,S$GLB,, | Performed by: NEUROLOGICAL SURGERY

## 2022-10-12 PROCEDURE — 3074F PR MOST RECENT SYSTOLIC BLOOD PRESSURE < 130 MM HG: ICD-10-PCS | Mod: CPTII,S$GLB,, | Performed by: NEUROLOGICAL SURGERY

## 2022-10-12 PROCEDURE — 99999 PR PBB SHADOW E&M-EST. PATIENT-LVL V: ICD-10-PCS | Mod: PBBFAC,,, | Performed by: NEUROLOGICAL SURGERY

## 2022-10-12 PROCEDURE — 1157F ADVNC CARE PLAN IN RCRD: CPT | Mod: CPTII,S$GLB,, | Performed by: NEUROLOGICAL SURGERY

## 2022-10-12 PROCEDURE — 1157F PR ADVANCE CARE PLAN OR EQUIV PRESENT IN MEDICAL RECORD: ICD-10-PCS | Mod: CPTII,S$GLB,, | Performed by: NEUROLOGICAL SURGERY

## 2022-10-12 PROCEDURE — 3288F FALL RISK ASSESSMENT DOCD: CPT | Mod: CPTII,S$GLB,, | Performed by: NEUROLOGICAL SURGERY

## 2022-10-12 PROCEDURE — 1125F PR PAIN SEVERITY QUANTIFIED, PAIN PRESENT: ICD-10-PCS | Mod: CPTII,S$GLB,, | Performed by: NEUROLOGICAL SURGERY

## 2022-10-12 PROCEDURE — 3288F PR FALLS RISK ASSESSMENT DOCUMENTED: ICD-10-PCS | Mod: CPTII,S$GLB,, | Performed by: NEUROLOGICAL SURGERY

## 2022-10-12 PROCEDURE — 3074F SYST BP LT 130 MM HG: CPT | Mod: CPTII,S$GLB,, | Performed by: NEUROLOGICAL SURGERY

## 2022-10-12 PROCEDURE — 99214 OFFICE O/P EST MOD 30 MIN: CPT | Mod: S$GLB,,, | Performed by: NEUROLOGICAL SURGERY

## 2022-10-12 NOTE — PROGRESS NOTES
Chief Complaint   Patient presents with    Headache        Percy Ramirez is a 82 y.o. male with a history of multiple medical diagnoses as listed below that presents for evaluation of memory loss.  He says he has been having short-term memory loss and has been having more difficulty with functioning around the home.  He says that he has been making some errors on occasion with his medications a seems to be more forgetful when it comes to functioning around the home.  He does not feel that there has been any impact on him being able to care for himself .  He says that one of his biggest problems has been remembering names, but other smaller things have been bothersome as well such as forgetting what he felt into a room to get or forgetting a task that he wanted to do around the home.    Interval History  06/18/2018  He has been doing about the same overall since he was last seen in clinic.  He has been bothered by a scratch on the left hand that he cannot get to stop bleeding.  He said that he noticed a scratch on Saturday and this by bending area he has continued to have some oozing of blood there.  He has not had any other problems in the time since he was last seen.    02/18/2019  Patient presents for routine follow-up.  He says that his memory continues to be a source of concern for him.  He has not had any significant change in his memory status. The patient presents alone to clinic today.  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  He says that since he is retired due to his age, ailing health, and failing vision he has been sitting around the home watching television most of the day.  He admits that he does not engage in much physical or mental activity.   He has been taking Aricept as directed without any complaints of any side effects.    02/10/2020  Despite the concerns expressed at his last visit he has been most bothered by burning, itching, and tingling sensations along the foot, pain is most prominent from the toes to the level of the ankle on both feet.  He says that he cannot say exactly when the symptoms began but they seem to be more intense and more intrusive on his life than it had been in the past.  He has been taking gabapentin as directed but feels that the medication offers little to no benefit in pain control.    04/08/2022  He continues to have difficulty with his memory but his main concern has been dizziness and pain in his head.  He says he has a sharp pain and an aching pain that tends to occur across the middle of his head.  The symptoms are more present toward the later parts of the day and seemed to be unresponsive to any medication he has used thus far.  Pain quality is usually a constant aching intensity that builds over the course of the day.  He has not had any weakness.  Occasionally pain will radiate to the back of the head but he has not had any pain in his neck or his shoulders.  He denies any light or sound sensitivity.  Pain is usually moderate in intensity lasting for several hours at a time.  He also continues to have episodes of vertigo which has been a chronic problem.  He has tried meclizine and other antiemetic medication but has not found any significant improvement.  Patient has used Valium in the past which was effective whenever the bowels were uncontrollable.    07/11/2022  He continues to have dizziness and he continues to have problems with his memory as well. In the past dizziness has been less often than recently. Medications have been well tolerated. He is worried about continued progression of his memory loss.    10/12/2022  Dizziness continues to be problematic.  He also has been having difficulty with pain in his  back and walking.  He feels that these symptoms with his lower extremities have been getting progressively worse in his difficulty walking have been getting progressively worse as well..    PAST MEDICAL HISTORY:  Past Medical History:   Diagnosis Date    Allergy     Arthritis     CAD, multiple vessel     DR Melissa    Cataract     Depression     History of colon polyps     Hyperlipidemia     Hypertension     Macular degeneration     Dr Razo for injection, Jennifer for eye    S/P CABG x 2     Type 2 diabetes mellitus with circulatory disorder     Dr. Aquino       PAST SURGICAL HISTORY:  Past Surgical History:   Procedure Laterality Date    broken elbow      left    COLONOSCOPY N/A 10/4/2017    Procedure: COLONOSCOPY;  Surgeon: Gabriel Leung MD;  Location: [facility] ENDO;  Service: Endoscopy;  Laterality: N/A;    EYE SURGERY      cataract    heart bypass      2004    HERNIA REPAIR      right    ROTATOR CUFF REPAIR      right  2004       SOCIAL HISTORY:  Social History     Socioeconomic History    Marital status:     Number of children: 4    Years of education: GED   Occupational History    Occupation: retired [employer]    Tobacco Use    Smoking status: Former     Packs/day: 3.00     Years: 45.00     Pack years: 135.00     Types: Cigarettes     Quit date: 2004     Years since quittin.5    Smokeless tobacco: Former   Substance and Sexual Activity    Alcohol use: Yes     Comment: was heavy drinker 35 years ago, rare use now    Drug use: No    Sexual activity: Yes     Partners: Female       FAMILY HISTORY:  Family History   Problem Relation Age of Onset    Arthritis Mother     Diabetes Mother     Stroke Mother     Heart attack Father     Cancer Brother     Throat cancer Brother     Diabetes Maternal Aunt     Diabetes Maternal Uncle     Heart disease Maternal Uncle     Diabetes Paternal Aunt     Heart disease Paternal Aunt     Heart disease Paternal Uncle     Heart disease Maternal Grandmother      "Cancer Maternal Grandfather        ALLERGIES AND MEDICATIONS: updated and reviewed.  Review of patient's allergies indicates:   Allergen Reactions    Codeine Nausea Only     weak    Ranexa [ranolazine]      Current Outpatient Medications   Medication Sig Dispense Refill    acetaminophen (TYLENOL) 325 MG tablet Take 2 tablets (650 mg total) by mouth every 6 (six) hours as needed. 13 tablet 0    albuterol (PROVENTIL/VENTOLIN HFA) 90 mcg/actuation inhaler INHALE 2 PUFFS BY MOUTH EVERY 6 HOURS AS NEEDED FOR WHEEZING OR  SHORTNESS  OF  BREATH 54 g 0    atorvastatin (LIPITOR) 40 MG tablet Take 40 mg by mouth once daily.      BD INSULIN PEN NEEDLE UF SHORT 31 gauge x 5/16" Ndle       BD INSULIN SYRINGE ULTRA-FINE 1/2 mL 31 gauge x 15/64" Syrg       blood sugar diagnostic Strp To check BG 3 times daily, to use with insurance preferred meter 200 strip 11    blood-glucose meter kit To check BG 3 times daily, to use with insurance preferred meter 1 each 0    carvedilol (COREG) 25 MG tablet Take 1 tablet (25 mg total) by mouth 2 (two) times daily. 180 tablet 0    ceramides 1,3,6-II (CERAVE DAILY MOISTURIZING) Lotn Apply twice daily to skin 355 mL 1    cinnamon bark 500 mg capsule Take 1,000 mg by mouth once daily.        clopidogreL (PLAVIX) 75 mg tablet Take 75 mg by mouth.      diazePAM (VALIUM) 2 MG tablet Take 1 tablet (2 mg total) by mouth every 12 (twelve) hours as needed for Anxiety (Dizziness). 20 tablet 0    diclofenac sodium (VOLTAREN) 1 % Gel Apply 2 g topically 2 (two) times daily. 100 g 0    diclofenac sodium (VOLTAREN) 1 % Gel Apply 2 g topically 3 (three) times daily. 50 g 0    donepeziL (ARICEPT) 10 MG tablet Take 1 tablet (10 mg total) by mouth every evening. Take one half tablet for one week then as prescribed. 30 tablet 11    DULoxetine (CYMBALTA) 60 MG capsule Take 1 capsule (60 mg total) by mouth once daily. 90 capsule 3    ENTRESTO  mg per tablet Take 1 tablet by mouth 2 (two) times daily.      " "fluticasone propionate (FLONASE) 50 mcg/actuation nasal spray 1 spray (50 mcg total) by Each Nostril route 2 (two) times daily. 18.2 mL 3    furosemide (LASIX) 40 MG tablet Take 40 mg by mouth once daily.      gabapentin (NEURONTIN) 300 MG capsule Take 4 capsules (1,200 mg total) by mouth 2 (two) times daily. (Patient taking differently: Take 900 mg by mouth 2 (two) times daily.) 540 capsule 1    glimepiride (AMARYL) 4 MG tablet Take 4 mg by mouth daily with breakfast.       HYDROcodone-acetaminophen (NORCO) 5-325 mg per tablet Take 1 tablet by mouth every 4 (four) hours as needed for Pain. Causes drowsiness. Do not drive or operate machinery with this medication. Do not drink alcohol with this medication. Causes constipation. Take with stool softener. 10 tablet 0    hydrOXYzine HCL (ATARAX) 25 MG tablet Take 1 tablet (25 mg total) by mouth 3 (three) times daily as needed for Itching or Anxiety. 90 tablet 3    insulin NPH-insulin regular, 70/30, (NOVOLIN 70/30) 100 unit/mL (70-30) injection Inject into the skin. Inject 10 units at breakfast and inject 10 units at night      insulin syringe-needle U-100 0.3 mL 31 gauge x 15/64" Syrg USE TO INJECT INSULIN THREE TIMES DAILY      insulin syringe-needle U-100 1/2 mL 31 x 5/16" Syrg       ipratropium (ATROVENT) 42 mcg (0.06 %) nasal spray 2 sprays by Nasal route every 6 (six) hours as needed for Rhinitis (use as needed for runny nose and also use 15 minutes prior to meal). Clean nose with saline prior to use 15 mL 3    isosorbide mononitrate (IMDUR) 30 MG 24 hr tablet Take 30 mg by mouth once daily.      lancets Misc To check BG 3 times daily, to use with insurance preferred meter 200 each 11    LIDOcaine (LIDODERM) 5 % Place 1 patch onto the skin once daily. Remove & Discard patch within 12 hours or as directed by MD Townsend patch 1    metformin (GLUCOPHAGE) 500 MG tablet Take 1,000 mg by mouth 2 (two) times daily with meals. 2 Tablets in the morning, 2 Tablets in the evening  "     metoclopramide HCl (REGLAN) 5 MG tablet Take 5 mg by mouth 3 (three) times daily as needed.      nitroGLYCERIN (NITROSTAT) 0.4 MG SL tablet DISSOLVE 1 TABLET UNDER THE TONGUE EVERY 5 MINUTES AS  NEEDED FOR CHEST PAIN. MAX  OF 3 TABLETS IN 15 MINUTES. CALL 911 IF PAIN PERSISTS.      pravastatin (PRAVACHOL) 20 MG tablet Take 1 tablet (20 mg total) by mouth once daily. 90 tablet 1    spironolactone (ALDACTONE) 25 MG tablet Take 25 mg by mouth.      triamcinolone acetonide 0.1% (KENALOG) 0.1 % cream SMARTSI Application Topical 2-3 Times Daily      triamcinolone acetonide 0.1% (KENALOG) 0.1 % paste Place onto teeth 2 (two) times daily. Put on sore area of tongue for 7 days 5 g 0    VIT C/VIT E AC/LUT/COPPER/ZINC (PRESERVISION LUTEIN ORAL) Take by mouth.      warfarin (COUMADIN) 5 MG tablet Take 2 tabs by mouth on Tuesday,Thursday, Saturday and Sundays then 1.5 on all other days.       No current facility-administered medications for this visit.       Review of Systems   Constitutional: Negative for activity change, fatigue and unexpected weight change.   HENT: Negative for trouble swallowing and voice change.    Eyes: Negative for photophobia, pain and visual disturbance.   Respiratory: Negative for apnea and shortness of breath.    Cardiovascular: Negative for chest pain and palpitations.   Gastrointestinal: Negative for constipation, nausea and vomiting.   Genitourinary: Negative for difficulty urinating.   Musculoskeletal: Negative for arthralgias, back pain, gait problem, myalgias and neck pain.   Skin: Negative for color change and rash.   Neurological: Negative for dizziness, seizures, syncope, speech difficulty, weakness, light-headedness, numbness and headaches.   Psychiatric/Behavioral: Positive for confusion and decreased concentration. Negative for agitation and behavioral problems.       Neurologic Exam     Mental Status   Oriented to person, place, and time.   Oriented to person.   Oriented to city.    Oriented to year, month, date and day.   Registration of memory: 5/5. Recall of objects at 5 minutes: 3/5.   Attention: normal. Concentration: normal.   Speech: speech is normal   Level of consciousness: alert  Knowledge: good.   Able to name object. Unable to repeat.     Cranial Nerves     CN II   Visual fields full to confrontation.   Right visual field deficit: none  Left visual field deficit: none     CN III, IV, VI   Pupils are equal, round, and reactive to light.  Extraocular motions are normal.   Right pupil: Size: 3 mm. Shape: regular. Accommodation: intact.   Left pupil: Size: 3 mm. Shape: regular. Accommodation: intact.   CN III: no CN III palsy  CN VI: no CN VI palsy  Nystagmus: none   Diplopia: none  Ophthalmoparesis: none  Upgaze: normal  Downgaze: normal  Conjugate gaze: present    CN V   Facial sensation intact.   Right facial sensation deficit: none  Left facial sensation deficit: none    CN VII   Facial expression full, symmetric.   Right facial weakness: none  Left facial weakness: none    CN VIII   CN VIII normal.     CN IX, X   CN IX normal.   CN X normal.   Palate: symmetric    CN XI   CN XI normal.   Right sternocleidomastoid strength: normal  Left sternocleidomastoid strength: normal  Right trapezius strength: normal  Left trapezius strength: normal    CN XII   CN XII normal.   Tongue deviation: none    Motor Exam   Muscle bulk: normal  Overall muscle tone: normal  Right arm tone: normal  Left arm tone: normal  Right leg tone: normal  Left leg tone: normal    Strength   Strength 5/5 throughout.     Sensory Exam   Right arm light touch: normal  Left arm light touch: normal  Right leg light touch: normal  Left leg light touch: normal  Right arm vibration: normal  Left arm vibration: normal  Right leg vibration: normal  Left leg vibration: normal  Right arm proprioception: normal  Left arm proprioception: normal  Right leg proprioception: normal  Left leg proprioception: normal  Right arm  "pinprick: normal  Left arm pinprick: normal  Right leg pinprick: normal  Left leg pinprick: normal    Gait, Coordination, and Reflexes     Gait  Gait: normal    Coordination   Romberg: negative  Finger to nose coordination: normal  Heel to shin coordination: normal  Tandem walking coordination: normal    Tremor   Resting tremor: absent    Reflexes   Right brachioradialis: 2+  Left brachioradialis: 2+  Right biceps: 2+  Left biceps: 2+  Right triceps: 2+  Left triceps: 2+  Right patellar: 2+  Left patellar: 2+  Right achilles: 2+  Left achilles: 2+  Right plantar: normal  Left plantar: normal      Physical Exam   Constitutional: He is oriented to person, place, and time. He appears well-developed and well-nourished.   HENT:   Head: Normocephalic and atraumatic.   Eyes: Pupils are equal, round, and reactive to light. EOM are normal.   Cardiovascular: Intact distal pulses.   Pulmonary/Chest: Effort normal. No respiratory distress.   Musculoskeletal: Normal range of motion.   Neurological: He is alert and oriented to person, place, and time. He has normal strength. He has a normal Finger-Nose-Finger Test, a normal Heel to Shin Test, a normal Romberg Test and a normal Tandem Gait Test. Gait normal.   Reflex Scores:       Tricep reflexes are 2+ on the right side and 2+ on the left side.       Bicep reflexes are 2+ on the right side and 2+ on the left side.       Brachioradialis reflexes are 2+ on the right side and 2+ on the left side.       Patellar reflexes are 2+ on the right side and 2+ on the left side.       Achilles reflexes are 2+ on the right side and 2+ on the left side.  Skin: Skin is warm and dry.   Psychiatric: He has a normal mood and affect. His speech is normal and behavior is normal.       Vitals:    10/12/22 0945   BP: (!) 104/57   Pulse: 64   Weight: 84.2 kg (185 lb 10 oz)   Height: 5' 8" (1.727 m)     MOCA 18/25 (visual-spatial portions of the examination were refused, as the patient has poor " vision)  MRI brain personally reviewed: Chronic microvascular ischemic changes noted.    Assessment & Plan:    Problem List Items Addressed This Visit    None  Visit Diagnoses       Lumbar radiculopathy, chronic    -  Primary    Relevant Orders    CT Lumbar Spine Without Contrast          Follow-up: No follow-ups on file.

## 2022-10-13 ENCOUNTER — TELEPHONE (OUTPATIENT)
Dept: FAMILY MEDICINE | Facility: CLINIC | Age: 82
End: 2022-10-13
Payer: MEDICARE

## 2022-10-13 NOTE — TELEPHONE ENCOUNTER
----- Message from Danitza Coyle sent at 10/13/2022 10:27 AM CDT -----  Regarding: self [phone]  Type: Patient Call Back    Who called: self     What is the request in detail: pt would like to know if he is update on all of his vaccines.     Can the clinic reply by [facility]? no    Would the patient rather a call back or a response via [facility]? Call back    Best call back number: [phone]

## 2022-10-14 ENCOUNTER — TELEPHONE (OUTPATIENT)
Dept: PULMONOLOGY | Facility: CLINIC | Age: 82
End: 2022-10-14
Payer: MEDICARE

## 2022-10-14 NOTE — TELEPHONE ENCOUNTER
Returned call no answer busy.            ----- Message from Ferny Watson sent at 10/14/2022 11:02 AM CDT -----  Type: Patient Call Back    Who called:Self    What is the request in detail: Pt is requesting to speak with MICHAEL Stinson or her nurse.    Can the clinic reply by [facility]? no    Would the patient rather a call back or a response via [facility]? Call back    Best call back number: [phone] (home)

## 2022-10-19 ENCOUNTER — HOSPITAL ENCOUNTER (OUTPATIENT)
Dept: RADIOLOGY | Facility: HOSPITAL | Age: 82
Discharge: HOME | End: 2022-10-19
Attending: NEUROLOGICAL SURGERY
Payer: MEDICARE

## 2022-10-19 DIAGNOSIS — M54.16 LUMBAR RADICULOPATHY: ICD-10-CM

## 2022-10-19 DIAGNOSIS — Z12.11 SPECIAL SCREENING FOR MALIGNANT NEOPLASMS, COLON: Primary | ICD-10-CM

## 2022-10-19 PROCEDURE — 72131 CT LUMBAR SPINE W/O DYE: CPT | Mod: TC

## 2022-10-19 PROCEDURE — 72131 CT LUMBAR SPINE WITHOUT CONTRAST: ICD-10-PCS | Mod: 26,,, | Performed by: RADIOLOGY

## 2022-10-19 PROCEDURE — 72131 CT LUMBAR SPINE W/O DYE: CPT | Mod: 26,,, | Performed by: RADIOLOGY

## 2022-10-20 ENCOUNTER — TELEPHONE (OUTPATIENT)
Dept: PULMONOLOGY | Facility: CLINIC | Age: 82
End: 2022-10-20
Payer: MEDICARE

## 2022-10-20 NOTE — TELEPHONE ENCOUNTER
----- Message from Rosie Butler MA sent at 10/18/2022  2:55 PM CDT -----  Contact: pt  Patient requesting a call to go over ct results.   ----- Message -----  From: Katherine Farfan  Sent: 10/18/2022  11:54 AM CDT  To: Milad Castro Staff    Pt requesting call back RE: would like to go over results to CT.      Confirmed contact below:  Contact Name:Percy Pangaux  Phone Number: [phone]

## 2022-10-24 ENCOUNTER — TELEPHONE (OUTPATIENT)
Dept: PULMONOLOGY | Facility: CLINIC | Age: 82
End: 2022-10-24
Payer: MEDICARE

## 2022-10-24 NOTE — TELEPHONE ENCOUNTER
Message sent to Gloria Stinson NP.      TERRY Juares  [facility]  [phone]                   ----- Message from Shannan Mae sent at 10/24/2022 10:43 AM CDT -----  Regarding: self [phone]  .Type: Patient Call Back    Who called: self    What is the request in detail: PT is requesting to know if he can get a lung transplant     Can the clinic reply by [facility]? Call back    Would the patient rather a call back or a response via [facility]? Call back    Best call back number .[phone]

## 2022-10-25 ENCOUNTER — TELEPHONE (OUTPATIENT)
Dept: PULMONOLOGY | Facility: CLINIC | Age: 82
End: 2022-10-25
Payer: MEDICARE

## 2022-10-25 ENCOUNTER — OFFICE VISIT (OUTPATIENT)
Dept: PODIATRY | Facility: CLINIC | Age: 82
End: 2022-10-25
Payer: MEDICARE

## 2022-10-25 VITALS — HEIGHT: 68 IN | BODY MASS INDEX: 28.04 KG/M2 | WEIGHT: 185 LBS

## 2022-10-25 DIAGNOSIS — E11.49 TYPE II DIABETES MELLITUS WITH NEUROLOGICAL MANIFESTATIONS: Primary | ICD-10-CM

## 2022-10-25 DIAGNOSIS — M20.42 HAMMER TOES OF BOTH FEET: ICD-10-CM

## 2022-10-25 DIAGNOSIS — L84 CORN OR CALLUS: ICD-10-CM

## 2022-10-25 DIAGNOSIS — L60.0 INGROWN NAIL: ICD-10-CM

## 2022-10-25 DIAGNOSIS — B35.1 ONYCHOMYCOSIS DUE TO DERMATOPHYTE: ICD-10-CM

## 2022-10-25 DIAGNOSIS — M20.41 HAMMER TOES OF BOTH FEET: ICD-10-CM

## 2022-10-25 DIAGNOSIS — M20.12 HALLUX ABDUCTO VALGUS, LEFT: ICD-10-CM

## 2022-10-25 DIAGNOSIS — M20.11 HALLUX ABDUCTO VALGUS, RIGHT: ICD-10-CM

## 2022-10-25 PROCEDURE — 1126F PR PAIN SEVERITY QUANTIFIED, NO PAIN PRESENT: ICD-10-PCS | Mod: CPTII,S$GLB,, | Performed by: PODIATRIST

## 2022-10-25 PROCEDURE — 99999 PR PBB SHADOW E&M-EST. PATIENT-LVL IV: ICD-10-PCS | Mod: PBBFAC,,, | Performed by: PODIATRIST

## 2022-10-25 PROCEDURE — 11721 PR DEBRIDEMENT OF NAILS, 6 OR MORE: ICD-10-PCS | Mod: 59,Q9,S$GLB, | Performed by: PODIATRIST

## 2022-10-25 PROCEDURE — 1159F MED LIST DOCD IN RCRD: CPT | Mod: CPTII,S$GLB,, | Performed by: PODIATRIST

## 2022-10-25 PROCEDURE — 1157F ADVNC CARE PLAN IN RCRD: CPT | Mod: CPTII,S$GLB,, | Performed by: PODIATRIST

## 2022-10-25 PROCEDURE — 11721 DEBRIDE NAIL 6 OR MORE: CPT | Mod: 59,Q9,S$GLB, | Performed by: PODIATRIST

## 2022-10-25 PROCEDURE — 99499 UNLISTED E&M SERVICE: CPT | Mod: S$GLB,,, | Performed by: PODIATRIST

## 2022-10-25 PROCEDURE — 1160F RVW MEDS BY RX/DR IN RCRD: CPT | Mod: CPTII,S$GLB,, | Performed by: PODIATRIST

## 2022-10-25 PROCEDURE — 11056 PARNG/CUTG B9 HYPRKR LES 2-4: CPT | Mod: Q9,S$GLB,, | Performed by: PODIATRIST

## 2022-10-25 PROCEDURE — 99999 PR PBB SHADOW E&M-EST. PATIENT-LVL IV: CPT | Mod: PBBFAC,,, | Performed by: PODIATRIST

## 2022-10-25 PROCEDURE — 1101F PT FALLS ASSESS-DOCD LE1/YR: CPT | Mod: CPTII,S$GLB,, | Performed by: PODIATRIST

## 2022-10-25 PROCEDURE — 1160F PR REVIEW ALL MEDS BY PRESCRIBER/CLIN PHARMACIST DOCUMENTED: ICD-10-PCS | Mod: CPTII,S$GLB,, | Performed by: PODIATRIST

## 2022-10-25 PROCEDURE — 3288F PR FALLS RISK ASSESSMENT DOCUMENTED: ICD-10-PCS | Mod: CPTII,S$GLB,, | Performed by: PODIATRIST

## 2022-10-25 PROCEDURE — 1157F PR ADVANCE CARE PLAN OR EQUIV PRESENT IN MEDICAL RECORD: ICD-10-PCS | Mod: CPTII,S$GLB,, | Performed by: PODIATRIST

## 2022-10-25 PROCEDURE — 1126F AMNT PAIN NOTED NONE PRSNT: CPT | Mod: CPTII,S$GLB,, | Performed by: PODIATRIST

## 2022-10-25 PROCEDURE — 11056 PR TRIM BENIGN HYPERKERATOTIC SKIN LESION,2-4: ICD-10-PCS | Mod: Q9,S$GLB,, | Performed by: PODIATRIST

## 2022-10-25 PROCEDURE — 1159F PR MEDICATION LIST DOCUMENTED IN MEDICAL RECORD: ICD-10-PCS | Mod: CPTII,S$GLB,, | Performed by: PODIATRIST

## 2022-10-25 PROCEDURE — 1101F PR PT FALLS ASSESS DOC 0-1 FALLS W/OUT INJ PAST YR: ICD-10-PCS | Mod: CPTII,S$GLB,, | Performed by: PODIATRIST

## 2022-10-25 PROCEDURE — 99499 NO LOS: ICD-10-PCS | Mod: S$GLB,,, | Performed by: PODIATRIST

## 2022-10-25 PROCEDURE — 3288F FALL RISK ASSESSMENT DOCD: CPT | Mod: CPTII,S$GLB,, | Performed by: PODIATRIST

## 2022-10-25 NOTE — TELEPHONE ENCOUNTER
Spoke with patient told him he can not have a lung transplant.                    ----- Message from Mor Li MA sent at 10/25/2022  7:32 AM CDT -----  MICHAEL Bravo MA  Caller: Unspecified (Yesterday, 10:54 AM)  No the cut off is 72 yo         Previous Messages     ----- Message -----   From: Mor Li MA   Sent: 10/24/2022  10:54 AM CDT   To: Gloria Stinson NP     PT is requesting to know if he can get a lung transplant

## 2022-11-03 ENCOUNTER — TELEPHONE (OUTPATIENT)
Dept: NEUROLOGY | Facility: CLINIC | Age: 82
End: 2022-11-03
Payer: MEDICARE

## 2022-11-03 NOTE — TELEPHONE ENCOUNTER
----- Message from Matilda Baires MA sent at 11/3/2022  7:55 AM CDT -----  Regarding: FW: self .[phone]    ----- Message -----  From: Doug Victoria MD  Sent: 11/3/2022   7:42 AM CDT  To: Matilda Baires MA  Subject: RE: self .[phone]                           Note was added to my chart when results were release. Recommended PT and surgical consult  ----- Message -----  From: Matilda Baires MA  Sent: 10/27/2022   1:47 PM CDT  To: Doug Victoria MD  Subject: FW: self .[phone]                           I need you to send me what to tell him   ----- Message -----  From: Shannan Mae  Sent: 10/27/2022   9:19 AM CDT  To: Julien Camacho Staff  Subject: self .[phone]                               .Type: Patient Call Back    Who called: self     What is the request in detail: Pt is requesting results from CT    Can the clinic reply by [facility]? Call back    Would the patient rather a call back or a response via [facility]? Call back      Best call back number: ..[phone]      Patient notified

## 2022-11-07 NOTE — PROGRESS NOTES
Subjective:      Patient ID: Percy Ramirez is a 82 y.o. male.    Chief Complaint: Diabetes Mellitus (PCP  (MICHAEL Nunez 08/10/2022)) and Nail Care    Percy is a 82 y.o. male who presents to the clinic for evaluation and treatment of high risk feet. Percy has a past medical history of Allergy, Arthritis, CAD, multiple vessel, Cataract, Depression, History of colon polyps, Hyperlipidemia, Hypertension, Macular degeneration, S/P CABG x 2, and Type 2 diabetes mellitus with circulatory disorder. The patient's chief complaint is elongated, thickened toenails aggravated by increased weight bearing, shoe gear, pressure. This patient has documented high risk feet requiring routine maintenance secondary to diabetes mellitis and those secondary complications of diabetes, as mentioned..    PCP: Kasie Edmondson MD    Date Last Seen by PCP:   Chief Complaint   Patient presents with    Diabetes Mellitus     PCP  (MICHAEL Nunez 08/10/2022)    Nail Care     Current shoe gear:  rx shoes, worn    Hemoglobin A1C   Date Value Ref Range Status   08/30/2022 8.1 (H) 4.0 - 5.6 % Final     Comment:     [facility]   06/01/2022 8.4 (H) 4.0 - 5.6 % Final     Comment:     [facility]   04/01/2022 8.3 (H) 4.0 - 5.6 % Final     Comment:     ADA Screening Guidelines:  5.7-6.4%  Consistent with prediabetes  >or=6.5%  Consistent with diabetes    High levels of fetal hemoglobin interfere with the HbA1C  assay. Heterozygous hemoglobin variants (HbS, HgC, etc)do  not significantly interfere with this assay.   However, presence of multiple variants may affect accuracy.     03/08/2022 8.3 (H) 4.0 - 5.6 % Final     Comment:     [facility]     Patient Active Problem List   Diagnosis    Major depressive disorder, recurrent episode, mild    Benign hypertension with chronic kidney disease, stage III    Type 2 diabetes, uncontrolled, with retinopathy    Coronary artery disease    S/P CABG x 2    Macular degeneration    Obstructive sleep apnea  "treated with BiPAP    Anxiety state, unspecified    Insulin long-term use    Primary osteoarthritis of both knees    Chronic low back pain    DJD (degenerative joint disease), lumbar    Type 2 diabetes, uncontrolled, with neuropathy    Bilateral carotid artery disease    Hyperlipidemia LDL goal <100    Type 2 diabetes, uncontrolled, with peripheral circulatory disorder    COPD with chronic bronchitis and emphysema    Atherosclerosis of abdominal aorta    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    Overweight (BMI 25.0-29.9)    Persistent atrial fibrillation    Chronic stable angina    Senile purpura    Osteoarthritis of carpometacarpal (CMC) joint of left thumb    MCI (mild cognitive impairment)    Chronic vertigo    Pulmonary nodule    Dyspnea on exertion    Chronic combined systolic and diastolic heart failure    Ambulates with cane    ILD (interstitial lung disease)    History of cardioversion    Erectile dysfunction    Secondary hyperparathyroidism of renal origin    Noncompliance with medication regimen    Exudative age-related macular degeneration, right eye, with active choroidal neovascularization    History of stroke    Chronic tension-type headache, intractable    Drug-induced immunodeficiency     Current Outpatient Medications on File Prior to Visit   Medication Sig Dispense Refill    acetaminophen (TYLENOL) 325 MG tablet Take 2 tablets (650 mg total) by mouth every 6 (six) hours as needed. 13 tablet 0    albuterol (PROVENTIL/VENTOLIN HFA) 90 mcg/actuation inhaler INHALE 2 PUFFS BY MOUTH EVERY 6 HOURS AS NEEDED FOR WHEEZING OR  SHORTNESS  OF  BREATH 54 g 0    atorvastatin (LIPITOR) 40 MG tablet Take 40 mg by mouth once daily.      BD INSULIN PEN NEEDLE UF SHORT 31 gauge x 5/16" Ndle       BD INSULIN SYRINGE ULTRA-FINE 1/2 mL 31 gauge x 15/64" Syrg       blood sugar diagnostic Strp To check BG 3 times daily, to use with insurance preferred meter 200 strip 11    carvedilol " "(COREG) 25 MG tablet Take 1 tablet (25 mg total) by mouth 2 (two) times daily. 180 tablet 0    ceramides 1,3,6-II (CERAVE DAILY MOISTURIZING) Lotn Apply twice daily to skin 355 mL 1    cinnamon bark 500 mg capsule Take 1,000 mg by mouth once daily.        clopidogreL (PLAVIX) 75 mg tablet Take 75 mg by mouth.      diclofenac sodium (VOLTAREN) 1 % Gel Apply 2 g topically 2 (two) times daily. 100 g 0    diclofenac sodium (VOLTAREN) 1 % Gel Apply 2 g topically 3 (three) times daily. 50 g 0    donepeziL (ARICEPT) 10 MG tablet Take 1 tablet (10 mg total) by mouth every evening. Take one half tablet for one week then as prescribed. 30 tablet 11    ENTRESTO  mg per tablet Take 1 tablet by mouth 2 (two) times daily.      fluticasone propionate (FLONASE) 50 mcg/actuation nasal spray 1 spray (50 mcg total) by Each Nostril route 2 (two) times daily. 18.2 mL 3    furosemide (LASIX) 40 MG tablet Take 40 mg by mouth once daily.      gabapentin (NEURONTIN) 300 MG capsule Take 4 capsules (1,200 mg total) by mouth 2 (two) times daily. (Patient taking differently: Take 900 mg by mouth 2 (two) times daily.) 540 capsule 1    glimepiride (AMARYL) 4 MG tablet Take 4 mg by mouth daily with breakfast.       HYDROcodone-acetaminophen (NORCO) 5-325 mg per tablet Take 1 tablet by mouth every 4 (four) hours as needed for Pain. Causes drowsiness. Do not drive or operate machinery with this medication. Do not drink alcohol with this medication. Causes constipation. Take with stool softener. 10 tablet 0    hydrOXYzine HCL (ATARAX) 25 MG tablet Take 1 tablet (25 mg total) by mouth 3 (three) times daily as needed for Itching or Anxiety. 90 tablet 3    insulin NPH-insulin regular, 70/30, (NOVOLIN 70/30) 100 unit/mL (70-30) injection Inject into the skin. Inject 10 units at breakfast and inject 10 units at night      insulin syringe-needle U-100 0.3 mL 31 gauge x 15/64" Syrg USE TO INJECT INSULIN THREE TIMES DAILY      insulin syringe-needle " "U-100 1/2 mL 31 x 5/16" Syrg       isosorbide mononitrate (IMDUR) 30 MG 24 hr tablet Take 30 mg by mouth once daily.      lancets Misc To check BG 3 times daily, to use with insurance preferred meter 200 each 11    LIDOcaine (LIDODERM) 5 % Place 1 patch onto the skin once daily. Remove & Discard patch within 12 hours or as directed by MD 5 patch 1    metformin (GLUCOPHAGE) 500 MG tablet Take 1,000 mg by mouth 2 (two) times daily with meals. 2 Tablets in the morning, 2 Tablets in the evening      metoclopramide HCl (REGLAN) 5 MG tablet Take 5 mg by mouth 3 (three) times daily as needed.      nitroGLYCERIN (NITROSTAT) 0.4 MG SL tablet DISSOLVE 1 TABLET UNDER THE TONGUE EVERY 5 MINUTES AS  NEEDED FOR CHEST PAIN. MAX  OF 3 TABLETS IN 15 MINUTES. CALL 911 IF PAIN PERSISTS.      pravastatin (PRAVACHOL) 20 MG tablet Take 1 tablet (20 mg total) by mouth once daily. 90 tablet 1    spironolactone (ALDACTONE) 25 MG tablet Take 25 mg by mouth.      triamcinolone acetonide 0.1% (KENALOG) 0.1 % cream SMARTSI Application Topical 2-3 Times Daily      VIT C/VIT E AC/LUT/COPPER/ZINC (PRESERVISION LUTEIN ORAL) Take by mouth.      warfarin (COUMADIN) 5 MG tablet Take 2 tabs by mouth on Tuesday,Thursday, Saturday and Sundays then 1.5 on all other days.      blood-glucose meter kit To check BG 3 times daily, to use with insurance preferred meter 1 each 0    diazePAM (VALIUM) 2 MG tablet Take 1 tablet (2 mg total) by mouth every 12 (twelve) hours as needed for Anxiety (Dizziness). 20 tablet 0    DULoxetine (CYMBALTA) 60 MG capsule Take 1 capsule (60 mg total) by mouth once daily. 90 capsule 3     No current facility-administered medications on file prior to visit.     Review of patient's allergies indicates:   Allergen Reactions    Codeine Nausea Only     weak     Past Surgical History:   Procedure Laterality Date    broken elbow      left    COLONOSCOPY N/A 10/4/2017    Procedure: COLONOSCOPY;  Surgeon: Gabriel Leung MD;  " "Location: [facility];  Service: Endoscopy;  Laterality: N/A;    EYE SURGERY      cataract    heart bypass      2004    HERNIA REPAIR      right    ROTATOR CUFF REPAIR      right  2004     Family History   Problem Relation Age of Onset    Arthritis Mother     Diabetes Mother     Stroke Mother     Heart attack Father     Cancer Brother     Throat cancer Brother     Diabetes Maternal Aunt     Diabetes Maternal Uncle     Heart disease Maternal Uncle     Diabetes Paternal Aunt     Heart disease Paternal Aunt     Heart disease Paternal Uncle     Heart disease Maternal Grandmother     Cancer Maternal Grandfather      Social History     Socioeconomic History    Marital status:     Number of children: 4    Years of education: GED   Occupational History    Occupation: retired tug    Tobacco Use    Smoking status: Former     Packs/day: 3.00     Years: 45.00     Pack years: 135.00     Types: Cigarettes     Quit date: 2004     Years since quittin.5    Smokeless tobacco: Former   Substance and Sexual Activity    Alcohol use: Yes     Comment: was heavy drinker 35 years ago, rare use now    Drug use: No    Sexual activity: Yes     Partners: Female     Review of Systems   Constitution: Negative for chills, fever and weakness.   Cardiovascular: Negative for claudication and leg swelling.   Respiratory: Positive for cough. Negative for shortness of breath.    Skin: Positive for dry skin and nail changes. Negative for itching and rash.   Musculoskeletal: Positive for arthritis, back pain and joint pain. Negative for falls, joint swelling and muscle weakness.   Gastrointestinal: Negative for diarrhea, nausea and vomiting.   Neurological: Positive for numbness and paresthesias. Negative for tremors.   Psychiatric/Behavioral: Negative for altered mental status and hallucinations.           Objective:       Vitals:    10/25/22 1036   Weight: 83.9 kg (185 lb)   Height: 5' 8" (1.727 m)   PainSc: 0-No pain "       Physical Exam   Constitutional:   General: Pt. is well-developed, well-nourished, appears stated age, in no acute distress, alert and oriented x 3. No evidence of depression, anxiety, or agitation. Calm, cooperative, and communicative. Appropriate interactions and affect.       Cardiovascular:   Pulses:       Dorsalis pedis pulses are 2+ on the right side, and 2+ on the left side.        Posterior tibial pulses are 1+ on the right side, and 1+ on the left side.   There is decreased digital hair.   Musculoskeletal:        Right foot: There is normal range of motion.        Left foot:  There is normal range of motion.   Muscle strength is 5/5 in all groups bilaterally.    Decreased stride, station of gait.  apropulsive toe off.  Increased angle and base of gait.    Patient has hammertoes of digits 2-5 bilateral partially reducible without symptom today.    Visible and palpable bunion without pain at dorsomedial 1st metatarsal head right and left.  Hallux abducted right and left partially reducible, tracks laterally without being track bound.  No ecchymosis, erythema, edema, or cardinal signs infection or signs of trauma same foot.     Neurological: A sensory deficit is present.   Burbank-Keon 5.07 monofilamant testing is diminished Farhad feet. Sharp/dull sensation diminished Bilaterally   Skin: Skin is warm, dry. No abrasion, no ecchymosis, no rash noted. He is not diaphoretic. No cyanosis. No pallor. Nails show no clubbing.   Medial and lateral hallux nail margin of right foot with ingrown nail plate and thick HPK. Surrounding erythema and minimal edema is noted there is no granuloma formation noted. no malodor     Toenails 1-5 bilaterally are elongated by 2-3 mm, thickened by 2-3 mm, discolored/yellowed, dystrophic, brittle with subungual debris.    Focal hyperkeratotic lesion consisting entirely of hyperkeratotic tissue without open skin, drainage, pus, fluctuance, malodor, or signs of infection: medial IPJ  of hallux concha    Interdigital Spaces clean, dry and without evidence of break in skin integrity   Psychiatric: He has a normal mood and affect. His speech is normal.   Nursing note and vitals reviewed.        Assessment:       Encounter Diagnoses   Name Primary?    Type II diabetes mellitus with neurological manifestations Yes    Hammer toes of both feet     Hallux abducto valgus, left     Hallux abducto valgus, right     Ingrown nail     Onychomycosis due to dermatophyte     Corn or callus          Plan:       Percy was seen today for diabetes mellitus and nail care.    Diagnoses and all orders for this visit:    Type II diabetes mellitus with neurological manifestations    Hammer toes of both feet    Hallux abducto valgus, left    Hallux abducto valgus, right    Ingrown nail    Onychomycosis due to dermatophyte    Corn or callus    I counseled the patient on his conditions, their implications and medical management.      - Shoe inspection. Diabetic Foot Education. Patient reminded of the importance of good nutrition and blood sugar control to help prevent podiatric complications of diabetes. Patient instructed on proper foot hygeine. We discussed wearing proper shoe gear, daily foot inspections, never walking without protective shoe gear, never putting sharp instruments to feet    - With patient's permission, nails were aggressively reduced and debrided x 10 to their soft tissue attachment mechanically and with electric , removing all offending nail and debris. Patient relates relief following the procedure. Utilizing sterile toenail clippers I aggressively trimmed  the offending right hallux  nail border approximately 3 mm from its edge and carried the nail plate incision down at an angle in order to wedge out the offending cryptotic portion of the nail plate. The offending border was then removed in toto. The remaining nail was grinded down with an electric  down to nail bed.  Silver nitrate to  any abrasions. Patient tolerated the procedure well and related significant relief.    - After cleansing the  area w/ alcohol prep pad the above mentioned hyperkeratosis was trimmed utilizing No 15 scapel, to a smooth base with out incident. Patient tolerated this  well and reported comfort to the area of medial hallux IPJ conhca    - He will continue to monitor the areas daily, inspect his feet, wear protective shoe gear when ambulatory, moisturizer to maintain skin integrity and follow in this office in approximately 2-3 months, sooner PRN

## 2022-11-11 ENCOUNTER — OFFICE VISIT (OUTPATIENT)
Dept: OTOLARYNGOLOGY | Facility: CLINIC | Age: 82
End: 2022-11-11
Payer: MEDICARE

## 2022-11-11 VITALS — BODY MASS INDEX: 28.04 KG/M2 | WEIGHT: 185 LBS | HEIGHT: 68 IN

## 2022-11-11 DIAGNOSIS — K14.0 GLOSSITIS: ICD-10-CM

## 2022-11-11 DIAGNOSIS — J31.0 GUSTATORY RHINITIS: Primary | ICD-10-CM

## 2022-11-11 PROCEDURE — 1159F PR MEDICATION LIST DOCUMENTED IN MEDICAL RECORD: ICD-10-PCS | Mod: CPTII,S$GLB,, | Performed by: OTOLARYNGOLOGY

## 2022-11-11 PROCEDURE — 1157F ADVNC CARE PLAN IN RCRD: CPT | Mod: CPTII,S$GLB,, | Performed by: OTOLARYNGOLOGY

## 2022-11-11 PROCEDURE — 1126F PR PAIN SEVERITY QUANTIFIED, NO PAIN PRESENT: ICD-10-PCS | Mod: CPTII,S$GLB,, | Performed by: OTOLARYNGOLOGY

## 2022-11-11 PROCEDURE — 1101F PR PT FALLS ASSESS DOC 0-1 FALLS W/OUT INJ PAST YR: ICD-10-PCS | Mod: CPTII,S$GLB,, | Performed by: OTOLARYNGOLOGY

## 2022-11-11 PROCEDURE — 1157F PR ADVANCE CARE PLAN OR EQUIV PRESENT IN MEDICAL RECORD: ICD-10-PCS | Mod: CPTII,S$GLB,, | Performed by: OTOLARYNGOLOGY

## 2022-11-11 PROCEDURE — 99213 OFFICE O/P EST LOW 20 MIN: CPT | Mod: S$GLB,,, | Performed by: OTOLARYNGOLOGY

## 2022-11-11 PROCEDURE — 1126F AMNT PAIN NOTED NONE PRSNT: CPT | Mod: CPTII,S$GLB,, | Performed by: OTOLARYNGOLOGY

## 2022-11-11 PROCEDURE — 1101F PT FALLS ASSESS-DOCD LE1/YR: CPT | Mod: CPTII,S$GLB,, | Performed by: OTOLARYNGOLOGY

## 2022-11-11 PROCEDURE — 3288F PR FALLS RISK ASSESSMENT DOCUMENTED: ICD-10-PCS | Mod: CPTII,S$GLB,, | Performed by: OTOLARYNGOLOGY

## 2022-11-11 PROCEDURE — 99213 PR OFFICE/OUTPT VISIT, EST, LEVL III, 20-29 MIN: ICD-10-PCS | Mod: S$GLB,,, | Performed by: OTOLARYNGOLOGY

## 2022-11-11 PROCEDURE — 3288F FALL RISK ASSESSMENT DOCD: CPT | Mod: CPTII,S$GLB,, | Performed by: OTOLARYNGOLOGY

## 2022-11-11 PROCEDURE — 1159F MED LIST DOCD IN RCRD: CPT | Mod: CPTII,S$GLB,, | Performed by: OTOLARYNGOLOGY

## 2022-11-11 RX ORDER — IPRATROPIUM BROMIDE 42 UG/1
2 SPRAY, METERED NASAL EVERY 6 HOURS PRN
Qty: 15 ML | Refills: 3 | Status: SHIPPED | OUTPATIENT
Start: 2022-11-11

## 2022-11-11 RX ORDER — TRIAMCINOLONE ACETONIDE 1 MG/G
PASTE DENTAL 2 TIMES DAILY
Qty: 5 G | Refills: 0 | Status: SHIPPED | OUTPATIENT
Start: 2022-11-11 | End: 2022-11-18

## 2022-11-11 NOTE — PATIENT INSTRUCTIONS
Always clean nose with saline prior to using medication nasal spray   Can try vitamin b12 under the tongue to see if helps with tongue soreness

## 2022-11-29 ENCOUNTER — TELEPHONE (OUTPATIENT)
Dept: FAMILY MEDICINE | Facility: CLINIC | Age: 82
End: 2022-11-29
Payer: MEDICARE

## 2022-11-29 NOTE — TELEPHONE ENCOUNTER
----- Message from Marina Nichole sent at 11/29/2022  1:31 PM CST -----  Type: Patient Call Back    Who called: Self     What is the request in detail: PT asking for a call back says he have questions about the Vaccine and all the shots he gotten     Can the clinic reply by [facility]? No     Would the patient rather a call back or a response via [facility]? Call     Best call back number: [phone]

## 2022-12-02 ENCOUNTER — TELEPHONE (OUTPATIENT)
Dept: FAMILY MEDICINE | Facility: CLINIC | Age: 82
End: 2022-12-02
Payer: MEDICARE

## 2022-12-02 DIAGNOSIS — R13.10 DYSPHAGIA, UNSPECIFIED TYPE: Primary | ICD-10-CM

## 2022-12-02 NOTE — TELEPHONE ENCOUNTER
----- Message from Amy Byron sent at 12/1/2022 12:59 PM CST -----  Type: Patient Call Back    Who called:pt     What is the request in detail:pt requesting to discuss his eating habits and swallowing habits. Call pt     Can the clinic reply by [facility]?    Would the patient rather a call back or a response via [facility]? call    Best call back number: [phone] (home)       Additional Information:

## 2022-12-13 LAB
CHOL/HDLC RATIO: 3
CHOLESTEROL, TOTAL: 87
EGFR: 56 ML/MIN/1.73M2
HBA1C MFR BLD: 8.3 % (ref 4–5.6)
HDLC SERPL-MCNC: 29 MG/DL
LDLC SERPL CALC-MCNC: 44 MG/DL
NONHDLC SERPL-MCNC: 58 MG/DL
TRIGL SERPL-MCNC: 63 MG/DL

## 2022-12-16 ENCOUNTER — PATIENT OUTREACH (OUTPATIENT)
Dept: ADMINISTRATIVE | Facility: HOSPITAL | Age: 82
End: 2022-12-16
Payer: MEDICARE

## 2022-12-16 RX ORDER — SPIRONOLACTONE 25 MG/1
12.5 TABLET ORAL
COMMUNITY
Start: 2022-10-25 | End: 2023-04-12 | Stop reason: SDUPTHER

## 2022-12-22 ENCOUNTER — TELEPHONE (OUTPATIENT)
Dept: FAMILY MEDICINE | Facility: CLINIC | Age: 82
End: 2022-12-22
Payer: MEDICARE

## 2022-12-22 NOTE — TELEPHONE ENCOUNTER
----- Message from Rashmi Mosquera sent at 12/22/2022  2:30 PM CST -----  Regarding: Call BAck  Name of Who is Calling: JAILENE JIMENES [[id number]]              What is the request in detail: Patient requesting a call back states he needs medication for pain. Please assist              Can the clinic reply by [facility]: No              [facility][phone]

## 2022-12-22 NOTE — TELEPHONE ENCOUNTER
----- Message from Donna Gillis sent at 12/22/2022 11:39 AM CST -----  Regarding: Requesting Advice  .Type:  Needs Medical Advice/Symptom-based Call    Who Called: Self     Symptoms (please be specific): He said that he is in  a lot of pain with a hernia. He has been taking tylenol  for it but is scared to keep taking it. He would like some pain medicine.     How long has patient had these symptoms: He said 9 months- year      Would the patient rather a call back or a response via [facility]? Call     Best Call Back Number: .[phone]             
Is This A New Presentation, Or A Follow-Up?: Skin Lesion
Tried calling patient to discuss his hernia. Patient didn't answer the phone but lvm.  
How Severe Is Your Skin Lesion?: mild
Has Your Skin Lesion Been Treated?: not been treated
Which Family Member (Optional)?: Mother

## 2022-12-22 NOTE — TELEPHONE ENCOUNTER
Recommend he take extra strength tylenol for pain.  Recommend he see general surgery for his hernia

## 2022-12-22 NOTE — TELEPHONE ENCOUNTER
Spoke to patient and he explains that he has a hernia that is hurting him really bad. He is in a lot of pain. I was able to schedule him to be seen on 1/12. I advised patient if he is in a lot of pain to try to go to the ER or urgent care. Patient is asking for a pain medication for his pain.

## 2022-12-23 NOTE — TELEPHONE ENCOUNTER
Call returned to patient and MD recommendation given. Patient stated they feel as if a pain medication is needed. Patient is taking Tylenol in the PM hours but it isn't really effective. Advised not to take too much Tylenol, follow directions on bottle. Verbally understood. Stated they could not afford transport to urgent care or emergency room and daughter works. Encouraged to keep appointment with MD on 1-12-23. Spoke with Kalyani, daughter, verbalized they are aware of patients circumstance and would make sure patient gets to appointment at this clinic on said day. Daughter gave explanation that patients hernia was denied invasive procedures due to heart related weekness and age. Daughter understood advised conversation and verbalized thank you.    157.48

## 2022-12-30 ENCOUNTER — TELEPHONE (OUTPATIENT)
Dept: FAMILY MEDICINE | Facility: CLINIC | Age: 82
End: 2022-12-30
Payer: MEDICARE

## 2022-12-30 ENCOUNTER — TELEPHONE (OUTPATIENT)
Dept: PULMONOLOGY | Facility: CLINIC | Age: 82
End: 2022-12-30
Payer: MEDICARE

## 2022-12-30 NOTE — TELEPHONE ENCOUNTER
Returned call no answer.                        ----- Message from Mor Li MA sent at 12/30/2022  7:13 AM CST -----  Katherine Castro Staff  Caller: pt (Yesterday,  4:26 PM)  Pt requesting call back RE: would like to know if he has to bring his Cpap machine       Confirmed contact below:   Contact Name:Percy Ramirez   Phone Number: [phone]

## 2022-12-30 NOTE — TELEPHONE ENCOUNTER
----- Message from Shannan Mae sent at 12/30/2022  1:44 PM CST -----  Regarding: self [phone]  .Type: Patient Call Back    Who called: self     What is the request in detail: OK stated that he's having back pain and needs to know how many how many tylenol he should be taking for relief      Can the clinic reply by [facility]? Call back     Would the patient rather a call back or a response via [facility]?  Call back     Best call back number: .[phone]

## 2022-12-30 NOTE — TELEPHONE ENCOUNTER
Pt is complaining about some back pain and hernia is bothering him, he gives his pain level a 10. Hes been taking 2 tylenols every night and is wondering if he can take anything else with it to relieve the pain.

## 2023-01-10 ENCOUNTER — OFFICE VISIT (OUTPATIENT)
Dept: PODIATRY | Facility: CLINIC | Age: 83
End: 2023-01-10
Payer: MEDICARE

## 2023-01-10 VITALS — HEIGHT: 68 IN | WEIGHT: 185 LBS | BODY MASS INDEX: 28.04 KG/M2

## 2023-01-10 DIAGNOSIS — E11.49 TYPE II DIABETES MELLITUS WITH NEUROLOGICAL MANIFESTATIONS: Primary | ICD-10-CM

## 2023-01-10 DIAGNOSIS — L84 CORN OR CALLUS: ICD-10-CM

## 2023-01-10 DIAGNOSIS — B35.1 ONYCHOMYCOSIS DUE TO DERMATOPHYTE: ICD-10-CM

## 2023-01-10 PROCEDURE — 1101F PR PT FALLS ASSESS DOC 0-1 FALLS W/OUT INJ PAST YR: ICD-10-PCS | Mod: CPTII,S$GLB,, | Performed by: PODIATRIST

## 2023-01-10 PROCEDURE — 11056 PARNG/CUTG B9 HYPRKR LES 2-4: CPT | Mod: Q9,S$GLB,, | Performed by: PODIATRIST

## 2023-01-10 PROCEDURE — 11721 DEBRIDE NAIL 6 OR MORE: CPT | Mod: 59,Q9,S$GLB, | Performed by: PODIATRIST

## 2023-01-10 PROCEDURE — 1101F PT FALLS ASSESS-DOCD LE1/YR: CPT | Mod: CPTII,S$GLB,, | Performed by: PODIATRIST

## 2023-01-10 PROCEDURE — 1126F AMNT PAIN NOTED NONE PRSNT: CPT | Mod: CPTII,S$GLB,, | Performed by: PODIATRIST

## 2023-01-10 PROCEDURE — 3288F PR FALLS RISK ASSESSMENT DOCUMENTED: ICD-10-PCS | Mod: CPTII,S$GLB,, | Performed by: PODIATRIST

## 2023-01-10 PROCEDURE — 1159F PR MEDICATION LIST DOCUMENTED IN MEDICAL RECORD: ICD-10-PCS | Mod: CPTII,S$GLB,, | Performed by: PODIATRIST

## 2023-01-10 PROCEDURE — 11721 PR DEBRIDEMENT OF NAILS, 6 OR MORE: ICD-10-PCS | Mod: 59,Q9,S$GLB, | Performed by: PODIATRIST

## 2023-01-10 PROCEDURE — 99999 PR PBB SHADOW E&M-EST. PATIENT-LVL IV: ICD-10-PCS | Mod: PBBFAC,,, | Performed by: PODIATRIST

## 2023-01-10 PROCEDURE — 1159F MED LIST DOCD IN RCRD: CPT | Mod: CPTII,S$GLB,, | Performed by: PODIATRIST

## 2023-01-10 PROCEDURE — 99499 NO LOS: ICD-10-PCS | Mod: S$GLB,,, | Performed by: PODIATRIST

## 2023-01-10 PROCEDURE — 1157F ADVNC CARE PLAN IN RCRD: CPT | Mod: CPTII,S$GLB,, | Performed by: PODIATRIST

## 2023-01-10 PROCEDURE — 11056 PR TRIM BENIGN HYPERKERATOTIC SKIN LESION,2-4: ICD-10-PCS | Mod: Q9,S$GLB,, | Performed by: PODIATRIST

## 2023-01-10 PROCEDURE — 1160F PR REVIEW ALL MEDS BY PRESCRIBER/CLIN PHARMACIST DOCUMENTED: ICD-10-PCS | Mod: CPTII,S$GLB,, | Performed by: PODIATRIST

## 2023-01-10 PROCEDURE — 1157F PR ADVANCE CARE PLAN OR EQUIV PRESENT IN MEDICAL RECORD: ICD-10-PCS | Mod: CPTII,S$GLB,, | Performed by: PODIATRIST

## 2023-01-10 PROCEDURE — 3288F FALL RISK ASSESSMENT DOCD: CPT | Mod: CPTII,S$GLB,, | Performed by: PODIATRIST

## 2023-01-10 PROCEDURE — 1160F RVW MEDS BY RX/DR IN RCRD: CPT | Mod: CPTII,S$GLB,, | Performed by: PODIATRIST

## 2023-01-10 PROCEDURE — 1126F PR PAIN SEVERITY QUANTIFIED, NO PAIN PRESENT: ICD-10-PCS | Mod: CPTII,S$GLB,, | Performed by: PODIATRIST

## 2023-01-10 PROCEDURE — 99499 UNLISTED E&M SERVICE: CPT | Mod: S$GLB,,, | Performed by: PODIATRIST

## 2023-01-10 PROCEDURE — 99999 PR PBB SHADOW E&M-EST. PATIENT-LVL IV: CPT | Mod: PBBFAC,,, | Performed by: PODIATRIST

## 2023-01-12 ENCOUNTER — OFFICE VISIT (OUTPATIENT)
Dept: FAMILY MEDICINE | Facility: CLINIC | Age: 83
End: 2023-01-12
Payer: MEDICARE

## 2023-01-12 VITALS
TEMPERATURE: 98 F | HEART RATE: 70 BPM | DIASTOLIC BLOOD PRESSURE: 70 MMHG | BODY MASS INDEX: 28.3 KG/M2 | OXYGEN SATURATION: 97 % | HEIGHT: 68 IN | WEIGHT: 186.75 LBS | SYSTOLIC BLOOD PRESSURE: 118 MMHG

## 2023-01-12 DIAGNOSIS — M54.41 CHRONIC RIGHT-SIDED LOW BACK PAIN WITH RIGHT-SIDED SCIATICA: ICD-10-CM

## 2023-01-12 DIAGNOSIS — J84.9 ILD (INTERSTITIAL LUNG DISEASE): ICD-10-CM

## 2023-01-12 DIAGNOSIS — N25.81 SECONDARY HYPERPARATHYROIDISM OF RENAL ORIGIN: ICD-10-CM

## 2023-01-12 DIAGNOSIS — E78.5 HYPERLIPIDEMIA LDL GOAL <100: ICD-10-CM

## 2023-01-12 DIAGNOSIS — I50.42 CHRONIC COMBINED SYSTOLIC AND DIASTOLIC HEART FAILURE: ICD-10-CM

## 2023-01-12 DIAGNOSIS — G89.29 CHRONIC RIGHT-SIDED LOW BACK PAIN WITH RIGHT-SIDED SCIATICA: ICD-10-CM

## 2023-01-12 DIAGNOSIS — E11.65 UNCONTROLLED TYPE 2 DIABETES MELLITUS WITH HYPERGLYCEMIA: ICD-10-CM

## 2023-01-12 DIAGNOSIS — E66.3 OVERWEIGHT (BMI 25.0-29.9): ICD-10-CM

## 2023-01-12 DIAGNOSIS — I48.19 PERSISTENT ATRIAL FIBRILLATION: ICD-10-CM

## 2023-01-12 DIAGNOSIS — J43.9 COPD WITH CHRONIC BRONCHITIS AND EMPHYSEMA: ICD-10-CM

## 2023-01-12 DIAGNOSIS — M17.0 PRIMARY OSTEOARTHRITIS OF BOTH KNEES: ICD-10-CM

## 2023-01-12 DIAGNOSIS — I20.89 CHRONIC STABLE ANGINA: ICD-10-CM

## 2023-01-12 DIAGNOSIS — I70.0 ATHEROSCLEROSIS OF ABDOMINAL AORTA: ICD-10-CM

## 2023-01-12 DIAGNOSIS — K40.90 LEFT INGUINAL HERNIA: Primary | ICD-10-CM

## 2023-01-12 DIAGNOSIS — J44.89 COPD WITH CHRONIC BRONCHITIS AND EMPHYSEMA: ICD-10-CM

## 2023-01-12 DIAGNOSIS — I77.9 BILATERAL CAROTID ARTERY DISEASE, UNSPECIFIED TYPE: ICD-10-CM

## 2023-01-12 PROCEDURE — 3074F SYST BP LT 130 MM HG: CPT | Mod: CPTII,S$GLB,, | Performed by: FAMILY MEDICINE

## 2023-01-12 PROCEDURE — 3288F FALL RISK ASSESSMENT DOCD: CPT | Mod: CPTII,S$GLB,, | Performed by: FAMILY MEDICINE

## 2023-01-12 PROCEDURE — 3078F PR MOST RECENT DIASTOLIC BLOOD PRESSURE < 80 MM HG: ICD-10-PCS | Mod: CPTII,S$GLB,, | Performed by: FAMILY MEDICINE

## 2023-01-12 PROCEDURE — 1157F ADVNC CARE PLAN IN RCRD: CPT | Mod: CPTII,S$GLB,, | Performed by: FAMILY MEDICINE

## 2023-01-12 PROCEDURE — 99499 RISK ADDL DX/OHS AUDIT: ICD-10-PCS | Mod: S$GLB,,, | Performed by: FAMILY MEDICINE

## 2023-01-12 PROCEDURE — 3288F PR FALLS RISK ASSESSMENT DOCUMENTED: ICD-10-PCS | Mod: CPTII,S$GLB,, | Performed by: FAMILY MEDICINE

## 2023-01-12 PROCEDURE — 99499 UNLISTED E&M SERVICE: CPT | Mod: S$GLB,,, | Performed by: FAMILY MEDICINE

## 2023-01-12 PROCEDURE — 1160F PR REVIEW ALL MEDS BY PRESCRIBER/CLIN PHARMACIST DOCUMENTED: ICD-10-PCS | Mod: CPTII,S$GLB,, | Performed by: FAMILY MEDICINE

## 2023-01-12 PROCEDURE — 3078F DIAST BP <80 MM HG: CPT | Mod: CPTII,S$GLB,, | Performed by: FAMILY MEDICINE

## 2023-01-12 PROCEDURE — 1159F MED LIST DOCD IN RCRD: CPT | Mod: CPTII,S$GLB,, | Performed by: FAMILY MEDICINE

## 2023-01-12 PROCEDURE — 99999 PR PBB SHADOW E&M-EST. PATIENT-LVL V: ICD-10-PCS | Mod: PBBFAC,,, | Performed by: FAMILY MEDICINE

## 2023-01-12 PROCEDURE — 3074F PR MOST RECENT SYSTOLIC BLOOD PRESSURE < 130 MM HG: ICD-10-PCS | Mod: CPTII,S$GLB,, | Performed by: FAMILY MEDICINE

## 2023-01-12 PROCEDURE — 1160F RVW MEDS BY RX/DR IN RCRD: CPT | Mod: CPTII,S$GLB,, | Performed by: FAMILY MEDICINE

## 2023-01-12 PROCEDURE — 1125F PR PAIN SEVERITY QUANTIFIED, PAIN PRESENT: ICD-10-PCS | Mod: CPTII,S$GLB,, | Performed by: FAMILY MEDICINE

## 2023-01-12 PROCEDURE — 1101F PR PT FALLS ASSESS DOC 0-1 FALLS W/OUT INJ PAST YR: ICD-10-PCS | Mod: CPTII,S$GLB,, | Performed by: FAMILY MEDICINE

## 2023-01-12 PROCEDURE — 99999 PR PBB SHADOW E&M-EST. PATIENT-LVL V: CPT | Mod: PBBFAC,,, | Performed by: FAMILY MEDICINE

## 2023-01-12 PROCEDURE — 99214 OFFICE O/P EST MOD 30 MIN: CPT | Mod: S$GLB,,, | Performed by: FAMILY MEDICINE

## 2023-01-12 PROCEDURE — 1157F PR ADVANCE CARE PLAN OR EQUIV PRESENT IN MEDICAL RECORD: ICD-10-PCS | Mod: CPTII,S$GLB,, | Performed by: FAMILY MEDICINE

## 2023-01-12 PROCEDURE — 99214 PR OFFICE/OUTPT VISIT, EST, LEVL IV, 30-39 MIN: ICD-10-PCS | Mod: S$GLB,,, | Performed by: FAMILY MEDICINE

## 2023-01-12 PROCEDURE — 1101F PT FALLS ASSESS-DOCD LE1/YR: CPT | Mod: CPTII,S$GLB,, | Performed by: FAMILY MEDICINE

## 2023-01-12 PROCEDURE — 1159F PR MEDICATION LIST DOCUMENTED IN MEDICAL RECORD: ICD-10-PCS | Mod: CPTII,S$GLB,, | Performed by: FAMILY MEDICINE

## 2023-01-12 PROCEDURE — 1125F AMNT PAIN NOTED PAIN PRSNT: CPT | Mod: CPTII,S$GLB,, | Performed by: FAMILY MEDICINE

## 2023-01-12 NOTE — PROGRESS NOTES
"  Physical Exam  /70   Pulse 70   Temp 98 °F (36.7 °C) (Oral)   Ht 5' 8" (1.727 m)   Wt 84.7 kg (186 lb 11.7 oz)   SpO2 97%   BMI 28.39 kg/m²      Office Visit    Patient Name: Percy Ramirez    : 1940  MRN: [id number]      Assessment/Plan:  Percy Ramirez is a 82 y.o. male who presents today for :    Left inguinal hernia  -     Ambulatory referral/consult to General Surgery; Future; Expected date: 2023  -abd exam benign except for presence of L inguinal hernia that's reducible with mild TTP. Prior General Surgery note from 22 reviewed - will have patient see dr. Ayala again for surgical consideration.  -Avoid heavy lifting as well as excessive straining while during BMs.     -Advised patient to call clinic if pt has any concerns or go the an Urgent Care/Emergency Room for further eval by a physician, especially if symptoms appears worse, or patient develops systemic Sx such as F/C/malaise/N/V. Pt voiced understanding.    Uncontrolled type 2 diabetes mellitus with hyperglycemia  Hyperlipidemia LDL goal <100  Atherosclerosis of abdominal aorta  Overweight (BMI 25.0-29.9)  -previous A1c reviewed:   Lab Results   Component Value Date    LABA1C 6.8 10/09/2017    HGBA1C 8.3 (H) 2022     -A1c above goal, advised to continue current medication regimen and follow up with Endocrine regularly. Reviewed with patient about routine diabetic care that includes but are not limited to regular eye exams, skin care, daily foot exam, proper nutrition, regular BG monitoring at home     COPD with chronic bronchitis and emphysema  ILD (interstitial lung disease)  Chronic combined systolic and diastolic heart failure  Persistent atrial fibrillation  Chronic stable angina  Bilateral carotid artery disease, unspecified type  -stable, continue current regimen       Primary osteoarthritis of both knees  -stable, continue Tylenol arthritis    Chronic right-sided low back pain with right-sided " sciatica  -stable, continue Tylenol arthritis    Secondary hyperparathyroidism of renal origin  -chronic and stable, asymptomatic, continue to monitor      Follow up for worsening Sx. Urgent care/ED precautions provided.        This note was created by combination of typed  and MModal dictation.  Transcription errors may be present.  If there are any questions, please contact me.      ----------------------------------------------------------------------------------------------------------------------      HPI:  Patient Care Team:  Kasie Edmondson MD as PCP - General (Internal Medicine)  Jac Rodriguez OD as Consulting Provider (Optometry)  Trever Arevalo MD as Consulting Physician (Ophthalmology)  Philippe Aquino MD as Consulting Physician (Endocrinology)  Mac Valderrama MD as Consulting Physician (Cardiology)  Marifer oRmero DPM as Consulting Physician (Podiatry)  Remedios Madison LPN as Care Coordinator    Percy is a 82 y.o. male with      Patient Active Problem List   Diagnosis    Major depressive disorder, recurrent episode, mild    Benign hypertension with chronic kidney disease, stage III    Type 2 diabetes, uncontrolled, with retinopathy    Coronary artery disease    S/P CABG x 2    Macular degeneration    Obstructive sleep apnea treated with BiPAP    Anxiety state, unspecified    Insulin long-term use    Primary osteoarthritis of both knees    Chronic low back pain    DJD (degenerative joint disease), lumbar    Type 2 diabetes, uncontrolled, with neuropathy    Bilateral carotid artery disease    Hyperlipidemia LDL goal <100    Type 2 diabetes, uncontrolled, with peripheral circulatory disorder    COPD with chronic bronchitis and emphysema    Atherosclerosis of abdominal aorta    Uncontrolled type 2 diabetes mellitus with hyperglycemia    Overweight (BMI 25.0-29.9)    Persistent atrial fibrillation    Chronic stable angina    Senile purpura    Osteoarthritis of carpometacarpal  (CMC) joint of left thumb    MCI (mild cognitive impairment)    Chronic vertigo    Pulmonary nodule    Dyspnea on exertion    Chronic combined systolic and diastolic heart failure    Ambulates with cane    ILD (interstitial lung disease)    History of cardioversion    Erectile dysfunction    Secondary hyperparathyroidism of renal origin    Noncompliance with medication regimen    Exudative age-related macular degeneration, right eye, with active choroidal neovascularization    History of stroke    Chronic tension-type headache, intractable    Drug-induced immunodeficiency     This patient is new to me       Patient with PMHx as above presents today for f/u Hernia      He has had a Left inguinal hernia present for over a year, which he worsening the past few months. Patient was previously cleared for surgery in May/June 2022, but his surgeon deemed that his moderately increased cardiac risk as evaluated by Cardiology outweighs the benefits of proceeding to surgery given his level of pain, but that if pain had worsened, that it would make sense to proceed with hernia repair. Patient states that certain daily activities he does daily, such as getting up or twisting his body a certain way can cause the pain to be worse and he would like to get it surgically repaired. He denies any fever/chills/N/V/changes in BM/malaise. He does have uncontrolled diabetes that is being followed by Endocrine. Otherwise, no other acute issues during this visit.        Hemoglobin A1C   Date Value Ref Range Status   12/13/2022 8.3 (H) 4.0 - 5.6 % Final     Comment:     [facility]   08/30/2022 8.1 (H) 4.0 - 5.6 % Final     Comment:     [facility]   06/01/2022 8.4 (H) 4.0 - 5.6 % Final     Comment:     [facility]         Additional ROS      CONST: no fever, +activity change, weight stable.   EYES: no vision change.   ENT: no sore throat. No dysphagia.   CV: no CP with exertion  RESP: no SOB  GI: no N/V/diarrhea/constipation  : no urinary  concerns  MSK: no new myalgias or arthralgias.   SKIN: no new rashes  NEURO: no focal deficits.   PSYCH: no new issues.             Patient Active Problem List   Diagnosis    Major depressive disorder, recurrent episode, mild    Benign hypertension with chronic kidney disease, stage III    Type 2 diabetes, uncontrolled, with retinopathy    Coronary artery disease    S/P CABG x 2    Macular degeneration    Obstructive sleep apnea treated with BiPAP    Anxiety state, unspecified    Insulin long-term use    Primary osteoarthritis of both knees    Chronic low back pain    DJD (degenerative joint disease), lumbar    Type 2 diabetes, uncontrolled, with neuropathy    Bilateral carotid artery disease    Hyperlipidemia LDL goal <100    Type 2 diabetes, uncontrolled, with peripheral circulatory disorder    COPD with chronic bronchitis and emphysema    Atherosclerosis of abdominal aorta    Uncontrolled type 2 diabetes mellitus with hyperglycemia    Overweight (BMI 25.0-29.9)    Persistent atrial fibrillation    Chronic stable angina    Senile purpura    Osteoarthritis of carpometacarpal (CMC) joint of left thumb    MCI (mild cognitive impairment)    Chronic vertigo    Pulmonary nodule    Dyspnea on exertion    Chronic combined systolic and diastolic heart failure    Ambulates with cane    ILD (interstitial lung disease)    History of cardioversion    Erectile dysfunction    Secondary hyperparathyroidism of renal origin    Noncompliance with medication regimen    Exudative age-related macular degeneration, right eye, with active choroidal neovascularization    History of stroke    Chronic tension-type headache, intractable    Drug-induced immunodeficiency       Current Medications  Medications reviewed/updated.     Current Outpatient Medications on File Prior to Visit   Medication Sig Dispense Refill    acetaminophen (TYLENOL) 325 MG tablet Take 2 tablets (650 mg total) by mouth every 6 (six) hours as needed. 13 tablet 0     "albuterol (PROVENTIL/VENTOLIN HFA) 90 mcg/actuation inhaler INHALE 2 PUFFS BY MOUTH EVERY 6 HOURS AS NEEDED FOR WHEEZING OR  SHORTNESS  OF  BREATH 54 g 0    atorvastatin (LIPITOR) 40 MG tablet Take 40 mg by mouth once daily.      BD INSULIN PEN NEEDLE UF SHORT 31 gauge x 5/16" Ndle       BD INSULIN SYRINGE ULTRA-FINE 1/2 mL 31 gauge x 15/64" Syrg       blood sugar diagnostic Strp To check BG 3 times daily, to use with insurance preferred meter 200 strip 11    blood-glucose meter kit To check BG 3 times daily, to use with insurance preferred meter 1 each 0    carvedilol (COREG) 25 MG tablet Take 1 tablet (25 mg total) by mouth 2 (two) times daily. 180 tablet 0    ceramides 1,3,6-II (CERAVE DAILY MOISTURIZING) Lotn Apply twice daily to skin 355 mL 1    cinnamon bark 500 mg capsule Take 1,000 mg by mouth once daily.        clopidogreL (PLAVIX) 75 mg tablet Take 75 mg by mouth.      diazePAM (VALIUM) 2 MG tablet Take 1 tablet (2 mg total) by mouth every 12 (twelve) hours as needed for Anxiety (Dizziness). 20 tablet 0    diclofenac sodium (VOLTAREN) 1 % Gel Apply 2 g topically 2 (two) times daily. 100 g 0    diclofenac sodium (VOLTAREN) 1 % Gel Apply 2 g topically 3 (three) times daily. 50 g 0    donepeziL (ARICEPT) 10 MG tablet Take 1 tablet (10 mg total) by mouth every evening. Take one half tablet for one week then as prescribed. 30 tablet 11    DULoxetine (CYMBALTA) 60 MG capsule Take 1 capsule (60 mg total) by mouth once daily. 90 capsule 3    ENTRESTO  mg per tablet Take 1 tablet by mouth 2 (two) times daily.      fluticasone propionate (FLONASE) 50 mcg/actuation nasal spray 1 spray (50 mcg total) by Each Nostril route 2 (two) times daily. 18.2 mL 3    furosemide (LASIX) 40 MG tablet Take 40 mg by mouth once daily.      gabapentin (NEURONTIN) 300 MG capsule Take 4 capsules (1,200 mg total) by mouth 2 (two) times daily. (Patient taking differently: Take 900 mg by mouth 2 (two) times daily.) 540 capsule 1    " "glimepiride (AMARYL) 4 MG tablet Take 4 mg by mouth daily with breakfast.       HYDROcodone-acetaminophen (NORCO) 5-325 mg per tablet Take 1 tablet by mouth every 4 (four) hours as needed for Pain. Causes drowsiness. Do not drive or operate machinery with this medication. Do not drink alcohol with this medication. Causes constipation. Take with stool softener. 10 tablet 0    hydrOXYzine HCL (ATARAX) 25 MG tablet Take 1 tablet (25 mg total) by mouth 3 (three) times daily as needed for Itching or Anxiety. 90 tablet 3    insulin NPH-insulin regular, 70/30, (NOVOLIN 70/30) 100 unit/mL (70-30) injection Inject into the skin. Inject 10 units at breakfast and inject 10 units at night      insulin syringe-needle U-100 0.3 mL 31 gauge x 15/64" Syrg USE TO INJECT INSULIN THREE TIMES DAILY      insulin syringe-needle U-100 1/2 mL 31 x 5/16" Syrg       ipratropium (ATROVENT) 42 mcg (0.06 %) nasal spray 2 sprays by Nasal route every 6 (six) hours as needed for Rhinitis (use as needed for runny nose and also use 15 minutes prior to meal). Clean nose with saline prior to use 15 mL 3    isosorbide mononitrate (IMDUR) 30 MG 24 hr tablet Take 30 mg by mouth once daily.      lancets Misc To check BG 3 times daily, to use with insurance preferred meter 200 each 11    LIDOcaine (LIDODERM) 5 % Place 1 patch onto the skin once daily. Remove & Discard patch within 12 hours or as directed by MD 5 patch 1    metformin (GLUCOPHAGE) 500 MG tablet Take 1,000 mg by mouth 2 (two) times daily with meals. 2 Tablets in the morning, 2 Tablets in the evening      methocarbamoL (ROBAXIN) 500 MG Tab TAKE 1 TABLET BY MOUTH 4 TIMES DAILY FOR 10 DAYS 60 tablet 0    metoclopramide HCl (REGLAN) 5 MG tablet Take 5 mg by mouth 3 (three) times daily as needed.      nitroGLYCERIN (NITROSTAT) 0.4 MG SL tablet DISSOLVE 1 TABLET UNDER THE TONGUE EVERY 5 MINUTES AS  NEEDED FOR CHEST PAIN. MAX  OF 3 TABLETS IN 15 MINUTES. CALL 911 IF PAIN PERSISTS.      pravastatin " (PRAVACHOL) 20 MG tablet Take 1 tablet (20 mg total) by mouth once daily. 90 tablet 1    spironolactone (ALDACTONE) 25 MG tablet Take 25 mg by mouth.      spironolactone (ALDACTONE) 25 MG tablet Take 12.5 mg by mouth.      triamcinolone acetonide 0.1% (KENALOG) 0.1 % cream SMARTSI Application Topical 2-3 Times Daily      VIT C/VIT E AC/LUT/COPPER/ZINC (PRESERVISION LUTEIN ORAL) Take by mouth.      warfarin (COUMADIN) 5 MG tablet Take 2 tabs by mouth on Tuesday,Thursday, Saturday and Sundays then 1.5 on all other days.       No current facility-administered medications on file prior to visit.           Past Surgical History:   Procedure Laterality Date    broken elbow      left    COLONOSCOPY N/A 10/4/2017    Procedure: COLONOSCOPY;  Surgeon: Gabriel Leung MD;  Location: [facility];  Service: Endoscopy;  Laterality: N/A;    EYE SURGERY      cataract    heart bypass      2004    HERNIA REPAIR      right    ROTATOR CUFF REPAIR      right  2004       Family History   Problem Relation Age of Onset    Arthritis Mother     Diabetes Mother     Stroke Mother     Heart attack Father     Cancer Brother     Throat cancer Brother     Diabetes Maternal Aunt     Diabetes Maternal Uncle     Heart disease Maternal Uncle     Diabetes Paternal Aunt     Heart disease Paternal Aunt     Heart disease Paternal Uncle     Heart disease Maternal Grandmother     Cancer Maternal Grandfather        Social History     Socioeconomic History    Marital status:     Number of children: 4    Years of education: GED   Occupational History    Occupation: retired tug    Tobacco Use    Smoking status: Former     Packs/day: 3.00     Years: 45.00     Pack years: 135.00     Types: Cigarettes     Quit date: 2004     Years since quittin.7    Smokeless tobacco: Former   Substance and Sexual Activity    Alcohol use: Yes     Comment: was heavy drinker 35 years ago, rare use now    Drug use: No    Sexual activity: Yes      "Partners: Female             Allergies   Review of patient's allergies indicates:   Allergen Reactions    Aricept odt [donepezil] Diarrhea and Nausea And Vomiting    Codeine Nausea Only     weak    Ranexa [ranolazine]              Review of Systems  See HPI      [unfilled]  /70   Pulse 70   Temp 98 °F (36.7 °C) (Oral)   Ht 5' 8" (1.727 m)   Wt 84.7 kg (186 lb 11.7 oz)   SpO2 97%   BMI 28.39 kg/m²       GEN: NAD, pleasant  HEENT: NCAT, PERRLA, EOMI, sclera clear, anicteric  NECK: normal, supple  LUNGS: CTAB, no w/r/r, normal respiratory effort  HEART: RRR, normal S1 and S2, no m/r/g, no palpitations, no edema  ABD: normal exam grossly. s/nt/nd, NABS, no rebound/guarding/mass. +reducible inguinal hernia on L side.No signs of strangulation/incarceration/erythema/swelling.  SKIN: warm and dry with normal turgor, no rashes, no other lesions.   PSYCH: AOx3, appropriate mood and affect.   MSK: extremities warm/well perfused, normal ROM in all 4 extremities, no c/c/e.   NEURO: normal without focal findings, CN II-XII are intact    "

## 2023-01-26 ENCOUNTER — TELEPHONE (OUTPATIENT)
Dept: FAMILY MEDICINE | Facility: CLINIC | Age: 83
End: 2023-01-26
Payer: MEDICARE

## 2023-01-26 DIAGNOSIS — G89.29 CHRONIC RIGHT-SIDED LOW BACK PAIN WITH RIGHT-SIDED SCIATICA: Primary | ICD-10-CM

## 2023-01-26 DIAGNOSIS — M54.41 CHRONIC RIGHT-SIDED LOW BACK PAIN WITH RIGHT-SIDED SCIATICA: Primary | ICD-10-CM

## 2023-01-26 NOTE — TELEPHONE ENCOUNTER
Patient states he came to the clinic and saw Dr. MILENA Saldivar on 01/12. Patient states he was told to take one tylenol in the morning one in the evening and two at night. Patient states the medication is not working at all for him the pain is still there. Patient states he only take them because it helps him to sleep. Patient states he need help states that he is hurting to the point that he started crying. Patient states he is going to see his general surgery Dr. Ayala to see if he can have surgery on his hernia. Patient states something has to happen because he is in so much pain and can't deal with it any longer. Patient states he would like message to go to his PCP  Please advise.

## 2023-01-26 NOTE — TELEPHONE ENCOUNTER
----- Message from Bindu Reyes sent at 1/26/2023 12:20 PM CST -----  Type: Patient Call Back     Who called:  JAILENE JIMENES [[id number]]     What is the request in detail: PT stated that he's having back pain and needs to know how many how many tylenol he should be taking for relief.Please contact to further discuss and advise.        Can the clinic reply by [facility]? Call back      Would the patient rather a call back or a response via [facility]?  Call back      Best call back number: [phone]

## 2023-01-27 ENCOUNTER — TELEPHONE (OUTPATIENT)
Dept: FAMILY MEDICINE | Facility: CLINIC | Age: 83
End: 2023-01-27
Payer: MEDICARE

## 2023-01-27 NOTE — TELEPHONE ENCOUNTER
----- Message from Donna Gillis sent at 1/27/2023  9:38 AM CST -----  Regarding: Return Call  .Type:  Patient Returning Call    Who Called: Self     Who Left Message for Patient: Leslie     Does the patient know what this is regarding?: pain management     Would the patient rather a call back or a response via [facility]? Call     Best Call Back Number: .[phone]

## 2023-01-30 ENCOUNTER — OFFICE VISIT (OUTPATIENT)
Dept: SURGERY | Facility: CLINIC | Age: 83
End: 2023-01-30
Payer: MEDICARE

## 2023-01-30 VITALS
HEIGHT: 68 IN | HEART RATE: 86 BPM | OXYGEN SATURATION: 92 % | WEIGHT: 187 LBS | DIASTOLIC BLOOD PRESSURE: 63 MMHG | BODY MASS INDEX: 28.34 KG/M2 | SYSTOLIC BLOOD PRESSURE: 118 MMHG

## 2023-01-30 DIAGNOSIS — K40.90 LEFT INGUINAL HERNIA: Primary | ICD-10-CM

## 2023-01-30 PROCEDURE — 99214 PR OFFICE/OUTPT VISIT, EST, LEVL IV, 30-39 MIN: ICD-10-PCS | Mod: S$GLB,,, | Performed by: SURGERY

## 2023-01-30 PROCEDURE — 1125F AMNT PAIN NOTED PAIN PRSNT: CPT | Mod: CPTII,S$GLB,, | Performed by: SURGERY

## 2023-01-30 PROCEDURE — 99999 PR PBB SHADOW E&M-EST. PATIENT-LVL V: CPT | Mod: PBBFAC,,, | Performed by: SURGERY

## 2023-01-30 PROCEDURE — 1157F ADVNC CARE PLAN IN RCRD: CPT | Mod: CPTII,S$GLB,, | Performed by: SURGERY

## 2023-01-30 PROCEDURE — 1159F MED LIST DOCD IN RCRD: CPT | Mod: CPTII,S$GLB,, | Performed by: SURGERY

## 2023-01-30 PROCEDURE — 3078F DIAST BP <80 MM HG: CPT | Mod: CPTII,S$GLB,, | Performed by: SURGERY

## 2023-01-30 PROCEDURE — 99999 PR PBB SHADOW E&M-EST. PATIENT-LVL V: ICD-10-PCS | Mod: PBBFAC,,, | Performed by: SURGERY

## 2023-01-30 PROCEDURE — 3288F PR FALLS RISK ASSESSMENT DOCUMENTED: ICD-10-PCS | Mod: CPTII,S$GLB,, | Performed by: SURGERY

## 2023-01-30 PROCEDURE — 1157F PR ADVANCE CARE PLAN OR EQUIV PRESENT IN MEDICAL RECORD: ICD-10-PCS | Mod: CPTII,S$GLB,, | Performed by: SURGERY

## 2023-01-30 PROCEDURE — 3074F PR MOST RECENT SYSTOLIC BLOOD PRESSURE < 130 MM HG: ICD-10-PCS | Mod: CPTII,S$GLB,, | Performed by: SURGERY

## 2023-01-30 PROCEDURE — 1159F PR MEDICATION LIST DOCUMENTED IN MEDICAL RECORD: ICD-10-PCS | Mod: CPTII,S$GLB,, | Performed by: SURGERY

## 2023-01-30 PROCEDURE — 3288F FALL RISK ASSESSMENT DOCD: CPT | Mod: CPTII,S$GLB,, | Performed by: SURGERY

## 2023-01-30 PROCEDURE — 99214 OFFICE O/P EST MOD 30 MIN: CPT | Mod: S$GLB,,, | Performed by: SURGERY

## 2023-01-30 PROCEDURE — 1101F PT FALLS ASSESS-DOCD LE1/YR: CPT | Mod: CPTII,S$GLB,, | Performed by: SURGERY

## 2023-01-30 PROCEDURE — 1101F PR PT FALLS ASSESS DOC 0-1 FALLS W/OUT INJ PAST YR: ICD-10-PCS | Mod: CPTII,S$GLB,, | Performed by: SURGERY

## 2023-01-30 PROCEDURE — 1125F PR PAIN SEVERITY QUANTIFIED, PAIN PRESENT: ICD-10-PCS | Mod: CPTII,S$GLB,, | Performed by: SURGERY

## 2023-01-30 PROCEDURE — 3078F PR MOST RECENT DIASTOLIC BLOOD PRESSURE < 80 MM HG: ICD-10-PCS | Mod: CPTII,S$GLB,, | Performed by: SURGERY

## 2023-01-30 PROCEDURE — 3074F SYST BP LT 130 MM HG: CPT | Mod: CPTII,S$GLB,, | Performed by: SURGERY

## 2023-01-30 NOTE — PROGRESS NOTES
Subjective:      Patient ID: Percy Ramirez is a 82 y.o. male.    Chief Complaint: Diabetes Mellitus (PCP  (MICHAEL Nunez 08/10/2022)) and Nail Care      Percy is a 82 y.o. male who presents to the clinic for evaluation and treatment of high risk feet. Percy has a past medical history of Allergy, Arthritis, CAD, multiple vessel, Cataract, Depression, History of colon polyps, Hyperlipidemia, Hypertension, Macular degeneration, S/P CABG x 2, and Type 2 diabetes mellitus with circulatory disorder. The patient's chief complaint is elongated, thickened toenails aggravated by increased weight bearing, shoe gear, pressure. Hypersensitive to the distal toes. This patient has documented high risk feet requiring routine maintenance secondary to diabetes mellitis and those secondary complications of diabetes, as mentioned..    PCP: Kasie Edmondson MD    Date Last Seen by PCP:   Chief Complaint   Patient presents with    Diabetes Mellitus     PCP  (MICHAEL Nunez 08/10/2022)    Nail Care     Current shoe gear:  rx shoes    Hemoglobin A1C   Date Value Ref Range Status   12/13/2022 8.3 (H) 4.0 - 5.6 % Final     Comment:     [facility]   08/30/2022 8.1 (H) 4.0 - 5.6 % Final     Comment:     [facility]   06/01/2022 8.4 (H) 4.0 - 5.6 % Final     Comment:     [facility]     Patient Active Problem List   Diagnosis    Major depressive disorder, recurrent episode, mild    Benign hypertension with chronic kidney disease, stage III    Type 2 diabetes, uncontrolled, with retinopathy    Coronary artery disease    S/P CABG x 2    Macular degeneration    Obstructive sleep apnea treated with BiPAP    Anxiety state, unspecified    Insulin long-term use    Primary osteoarthritis of both knees    Chronic low back pain    DJD (degenerative joint disease), lumbar    Type 2 diabetes, uncontrolled, with neuropathy    Bilateral carotid artery disease    Hyperlipidemia LDL goal <100    Type 2 diabetes, uncontrolled, with peripheral  "circulatory disorder    COPD with chronic bronchitis and emphysema    Atherosclerosis of abdominal aorta    Uncontrolled type 2 diabetes mellitus with hyperglycemia    Overweight (BMI 25.0-29.9)    Persistent atrial fibrillation    Chronic stable angina    Senile purpura    Osteoarthritis of carpometacarpal (CMC) joint of left thumb    MCI (mild cognitive impairment)    Chronic vertigo    Pulmonary nodule    Dyspnea on exertion    Chronic combined systolic and diastolic heart failure    Ambulates with cane    ILD (interstitial lung disease)    History of cardioversion    Erectile dysfunction    Secondary hyperparathyroidism of renal origin    Noncompliance with medication regimen    Exudative age-related macular degeneration, right eye, with active choroidal neovascularization    History of stroke    Chronic tension-type headache, intractable    Drug-induced immunodeficiency     Current Outpatient Medications on File Prior to Visit   Medication Sig Dispense Refill    acetaminophen (TYLENOL) 325 MG tablet Take 2 tablets (650 mg total) by mouth every 6 (six) hours as needed. 13 tablet 0    albuterol (PROVENTIL/VENTOLIN HFA) 90 mcg/actuation inhaler INHALE 2 PUFFS BY MOUTH EVERY 6 HOURS AS NEEDED FOR WHEEZING OR  SHORTNESS  OF  BREATH 54 g 0    atorvastatin (LIPITOR) 40 MG tablet Take 40 mg by mouth once daily.      BD INSULIN PEN NEEDLE UF SHORT 31 gauge x 5/16" Ndle       BD INSULIN SYRINGE ULTRA-FINE 1/2 mL 31 gauge x 15/64" Syrg       blood sugar diagnostic Strp To check BG 3 times daily, to use with insurance preferred meter 200 strip 11    carvedilol (COREG) 25 MG tablet Take 1 tablet (25 mg total) by mouth 2 (two) times daily. 180 tablet 0    ceramides 1,3,6-II (CERAVE DAILY MOISTURIZING) Lotn Apply twice daily to skin 355 mL 1    cinnamon bark 500 mg capsule Take 1,000 mg by mouth once daily.        clopidogreL (PLAVIX) 75 mg tablet Take 75 mg by mouth.      diclofenac sodium (VOLTAREN) 1 % Gel Apply 2 g " "topically 2 (two) times daily. 100 g 0    diclofenac sodium (VOLTAREN) 1 % Gel Apply 2 g topically 3 (three) times daily. 50 g 0    donepeziL (ARICEPT) 10 MG tablet Take 1 tablet (10 mg total) by mouth every evening. Take one half tablet for one week then as prescribed. 30 tablet 11    ENTRESTO  mg per tablet Take 1 tablet by mouth 2 (two) times daily.      fluticasone propionate (FLONASE) 50 mcg/actuation nasal spray 1 spray (50 mcg total) by Each Nostril route 2 (two) times daily. 18.2 mL 3    furosemide (LASIX) 40 MG tablet Take 40 mg by mouth once daily.      gabapentin (NEURONTIN) 300 MG capsule Take 4 capsules (1,200 mg total) by mouth 2 (two) times daily. (Patient taking differently: Take 900 mg by mouth 2 (two) times daily.) 540 capsule 1    glimepiride (AMARYL) 4 MG tablet Take 4 mg by mouth daily with breakfast.       HYDROcodone-acetaminophen (NORCO) 5-325 mg per tablet Take 1 tablet by mouth every 4 (four) hours as needed for Pain. Causes drowsiness. Do not drive or operate machinery with this medication. Do not drink alcohol with this medication. Causes constipation. Take with stool softener. 10 tablet 0    hydrOXYzine HCL (ATARAX) 25 MG tablet Take 1 tablet (25 mg total) by mouth 3 (three) times daily as needed for Itching or Anxiety. 90 tablet 3    insulin NPH-insulin regular, 70/30, (NOVOLIN 70/30) 100 unit/mL (70-30) injection Inject into the skin. Inject 10 units at breakfast and inject 10 units at night      insulin syringe-needle U-100 0.3 mL 31 gauge x 15/64" Syrg USE TO INJECT INSULIN THREE TIMES DAILY      insulin syringe-needle U-100 1/2 mL 31 x 5/16" Syrg       ipratropium (ATROVENT) 42 mcg (0.06 %) nasal spray 2 sprays by Nasal route every 6 (six) hours as needed for Rhinitis (use as needed for runny nose and also use 15 minutes prior to meal). Clean nose with saline prior to use 15 mL 3    isosorbide mononitrate (IMDUR) 30 MG 24 hr tablet Take 30 mg by mouth once daily.      lancets " Misc To check BG 3 times daily, to use with insurance preferred meter 200 each 11    LIDOcaine (LIDODERM) 5 % Place 1 patch onto the skin once daily. Remove & Discard patch within 12 hours or as directed by MD Townsend patch 1    metformin (GLUCOPHAGE) 500 MG tablet Take 1,000 mg by mouth 2 (two) times daily with meals. 2 Tablets in the morning, 2 Tablets in the evening      methocarbamoL (ROBAXIN) 500 MG Tab TAKE 1 TABLET BY MOUTH 4 TIMES DAILY FOR 10 DAYS 60 tablet 0    metoclopramide HCl (REGLAN) 5 MG tablet Take 5 mg by mouth 3 (three) times daily as needed.      nitroGLYCERIN (NITROSTAT) 0.4 MG SL tablet DISSOLVE 1 TABLET UNDER THE TONGUE EVERY 5 MINUTES AS  NEEDED FOR CHEST PAIN. MAX  OF 3 TABLETS IN 15 MINUTES. CALL 911 IF PAIN PERSISTS.      pravastatin (PRAVACHOL) 20 MG tablet Take 1 tablet (20 mg total) by mouth once daily. 90 tablet 1    spironolactone (ALDACTONE) 25 MG tablet Take 25 mg by mouth.      spironolactone (ALDACTONE) 25 MG tablet Take 12.5 mg by mouth.      triamcinolone acetonide 0.1% (KENALOG) 0.1 % cream SMARTSI Application Topical 2-3 Times Daily      VIT C/VIT E AC/LUT/COPPER/ZINC (PRESERVISION LUTEIN ORAL) Take by mouth.      warfarin (COUMADIN) 5 MG tablet Take 2 tabs by mouth on Tuesday,Thursday, Saturday and Sundays then 1.5 on all other days.      blood-glucose meter kit To check BG 3 times daily, to use with insurance preferred meter 1 each 0    diazePAM (VALIUM) 2 MG tablet Take 1 tablet (2 mg total) by mouth every 12 (twelve) hours as needed for Anxiety (Dizziness). 20 tablet 0    DULoxetine (CYMBALTA) 60 MG capsule Take 1 capsule (60 mg total) by mouth once daily. 90 capsule 3     No current facility-administered medications on file prior to visit.     Review of patient's allergies indicates:   Allergen Reactions    Codeine Nausea Only     weak     Past Surgical History:   Procedure Laterality Date    broken elbow      left    COLONOSCOPY N/A 10/4/2017    Procedure: COLONOSCOPY;   "Surgeon: Gabriel Leung MD;  Location: [facility];  Service: Endoscopy;  Laterality: N/A;    EYE SURGERY      cataract    heart bypass      2004    HERNIA REPAIR      right    ROTATOR CUFF REPAIR      right  2004     Family History   Problem Relation Age of Onset    Arthritis Mother     Diabetes Mother     Stroke Mother     Heart attack Father     Cancer Brother     Throat cancer Brother     Diabetes Maternal Aunt     Diabetes Maternal Uncle     Heart disease Maternal Uncle     Diabetes Paternal Aunt     Heart disease Paternal Aunt     Heart disease Paternal Uncle     Heart disease Maternal Grandmother     Cancer Maternal Grandfather      Social History     Socioeconomic History    Marital status:     Number of children: 4    Years of education: GED   Occupational History    Occupation: retired tug    Tobacco Use    Smoking status: Former     Packs/day: 3.00     Years: 45.00     Pack years: 135.00     Types: Cigarettes     Quit date: 2004     Years since quittin.7    Smokeless tobacco: Former   Substance and Sexual Activity    Alcohol use: Yes     Comment: was heavy drinker 35 years ago, rare use now    Drug use: No    Sexual activity: Yes     Partners: Female     Review of Systems   Constitution: Negative for chills, fever and weakness.   Cardiovascular: Negative for claudication and leg swelling.   Respiratory: Positive for cough. Negative for shortness of breath.    Skin: Positive for dry skin and nail changes. Negative for itching and rash.   Musculoskeletal: Positive for arthritis, back pain and joint pain. Negative for falls, joint swelling and muscle weakness.   Gastrointestinal: Negative for diarrhea, nausea and vomiting.   Neurological: Positive for numbness and paresthesias. Negative for tremors.   Psychiatric/Behavioral: Negative for altered mental status and hallucinations.           Objective:       Vitals:    01/10/23 1005   Weight: 83.9 kg (185 lb)   Height: 5' 8" (1.727 " m)   PainSc: 0-No pain       Physical Exam   Constitutional:   General: Pt. is well-developed, well-nourished, appears stated age, in no acute distress, alert and oriented x 3. No evidence of depression, anxiety, or agitation. Calm, cooperative, and communicative. Appropriate interactions and affect.       Cardiovascular:   Pulses:       Dorsalis pedis pulses are 2+ on the right side, and 2+ on the left side.        Posterior tibial pulses are 1+ on the right side, and 1+ on the left side.   There is decreased digital hair.   Musculoskeletal:        Right foot: There is normal range of motion.        Left foot:  There is normal range of motion.   Muscle strength is 5/5 in all groups bilaterally.    Decreased stride, station of gait.  apropulsive toe off.  Increased angle and base of gait.    Patient has hammertoes of digits 2-5 bilateral partially reducible without symptom today.    Visible and palpable bunion without pain at dorsomedial 1st metatarsal head right and left.  Hallux abducted right and left partially reducible, tracks laterally without being track bound.  No ecchymosis, erythema, edema, or cardinal signs infection or signs of trauma same foot.     Neurological: A sensory deficit is present.   [place] 5.07 monofilamant testing is diminished Farhad feet. Sharp/dull sensation diminished Bilaterally   Skin: Skin is warm, dry. No abrasion, no ecchymosis, no rash noted. He is not diaphoretic. No cyanosis. No pallor. Nails show no clubbing.   Medial and lateral hallux nail margin of right foot with ingrown nail plate and thick HPK. Surrounding erythema and minimal edema is noted there is no granuloma formation noted. no malodor     Toenails 1-5 bilaterally are elongated by 2-3 mm, thickened by 2-3 mm, discolored/yellowed, dystrophic, brittle with subungual debris.    Focal hyperkeratotic lesion consisting entirely of hyperkeratotic tissue without open skin, drainage, pus, fluctuance, malodor, or signs  of infection: medial IPJ of hallux concha    Interdigital Spaces clean, dry and without evidence of break in skin integrity   Psychiatric: He has a normal mood and affect. His speech is normal.   Nursing note and vitals reviewed.        Assessment:       Encounter Diagnoses   Name Primary?    Type II diabetes mellitus with neurological manifestations Yes    Onychomycosis due to dermatophyte     Corn or callus          Plan:       Percy was seen today for diabetes mellitus and nail care.    Diagnoses and all orders for this visit:    Type II diabetes mellitus with neurological manifestations    Onychomycosis due to dermatophyte    Corn or callus      I counseled the patient on his conditions, their implications and medical management.      - Shoe inspection. Diabetic Foot Education. Patient reminded of the importance of good nutrition and blood sugar control to help prevent podiatric complications of diabetes. Patient instructed on proper foot hygeine. We discussed wearing proper shoe gear, daily foot inspections, never walking without protective shoe gear, never putting sharp instruments to feet    - With patient's permission, nails were aggressively reduced and debrided x 10 to their soft tissue attachment mechanically and with electric , removing all offending nail and debris. Patient relates relief following the procedure. Utilizing sterile toenail clippers I aggressively trimmed  the offending right hallux  nail border approximately 3 mm from its edge and carried the nail plate incision down at an angle in order to wedge out the offending cryptotic portion of the nail plate. The offending border was then removed in toto. The remaining nail was grinded down with an electric  down to nail bed.  Silver nitrate to any abrasions. Patient tolerated the procedure well and related significant relief.    - After cleansing the  area w/ alcohol prep pad the above mentioned hyperkeratosis was trimmed utilizing  No 15 scapel, to a smooth base with out incident. Patient tolerated this  well and reported comfort to the area of medial hallux IPJ concha    - He will continue to monitor the areas daily, inspect his feet, wear protective shoe gear when ambulatory, moisturizer to maintain skin integrity and follow in this office in approximately 2-3 months, sooner PRN

## 2023-01-30 NOTE — PROGRESS NOTES
"83 y/o man with symptomatic inguinal hernia.  He was previously evaluated for repair and he decided not to undergo the surgery due to the level of symptoms at that time and his perceived risk.  He now presents to surgery clinic for repeat evaluation and reports increased pain with certain movements and intolerance of tylenol due to it "putting him to sleep"    He is on continuous supplemental oxygen and gets short of breath after about 20 feet of ambulation with a walker.  He has upcoming appointments with his pulmonologist and cardiologist.    We further discussed options for repair and preference would be for laparoscopic repair but that would require intubation, but would have the fastest recovery. Unfortunately his pulmonary status is also his biggest risk factor.    Another option would be to fix the hernia with an open approach under local with mild sedation. This would preclude the need to intubate him but would have the longest recovery and could compromise his functional status after open repair.    The last option would be not to fix it and continue to treat his symptoms.      We will await input from his other treating physicians before making a decision.    Approximately 30 minutes was spent discussion and documenting the options.  "

## 2023-01-31 ENCOUNTER — OFFICE VISIT (OUTPATIENT)
Dept: PULMONOLOGY | Facility: CLINIC | Age: 83
End: 2023-01-31
Payer: MEDICARE

## 2023-01-31 VITALS
HEART RATE: 80 BPM | DIASTOLIC BLOOD PRESSURE: 69 MMHG | BODY MASS INDEX: 28.34 KG/M2 | SYSTOLIC BLOOD PRESSURE: 118 MMHG | WEIGHT: 187 LBS | HEIGHT: 68 IN | OXYGEN SATURATION: 93 %

## 2023-01-31 DIAGNOSIS — J44.89 COPD WITH CHRONIC BRONCHITIS AND EMPHYSEMA: Primary | ICD-10-CM

## 2023-01-31 DIAGNOSIS — J84.9 ILD (INTERSTITIAL LUNG DISEASE): ICD-10-CM

## 2023-01-31 DIAGNOSIS — G47.33 OBSTRUCTIVE SLEEP APNEA TREATED WITH BIPAP: ICD-10-CM

## 2023-01-31 DIAGNOSIS — J43.9 COPD WITH CHRONIC BRONCHITIS AND EMPHYSEMA: Primary | ICD-10-CM

## 2023-01-31 PROCEDURE — 99999 PR PBB SHADOW E&M-EST. PATIENT-LVL V: ICD-10-PCS | Mod: PBBFAC,,, | Performed by: NURSE PRACTITIONER

## 2023-01-31 PROCEDURE — 3288F PR FALLS RISK ASSESSMENT DOCUMENTED: ICD-10-PCS | Mod: CPTII,S$GLB,, | Performed by: NURSE PRACTITIONER

## 2023-01-31 PROCEDURE — 1125F PR PAIN SEVERITY QUANTIFIED, PAIN PRESENT: ICD-10-PCS | Mod: CPTII,S$GLB,, | Performed by: NURSE PRACTITIONER

## 2023-01-31 PROCEDURE — 1157F PR ADVANCE CARE PLAN OR EQUIV PRESENT IN MEDICAL RECORD: ICD-10-PCS | Mod: CPTII,S$GLB,, | Performed by: NURSE PRACTITIONER

## 2023-01-31 PROCEDURE — 1101F PR PT FALLS ASSESS DOC 0-1 FALLS W/OUT INJ PAST YR: ICD-10-PCS | Mod: CPTII,S$GLB,, | Performed by: NURSE PRACTITIONER

## 2023-01-31 PROCEDURE — 1159F PR MEDICATION LIST DOCUMENTED IN MEDICAL RECORD: ICD-10-PCS | Mod: CPTII,S$GLB,, | Performed by: NURSE PRACTITIONER

## 2023-01-31 PROCEDURE — 3078F PR MOST RECENT DIASTOLIC BLOOD PRESSURE < 80 MM HG: ICD-10-PCS | Mod: CPTII,S$GLB,, | Performed by: NURSE PRACTITIONER

## 2023-01-31 PROCEDURE — 3078F DIAST BP <80 MM HG: CPT | Mod: CPTII,S$GLB,, | Performed by: NURSE PRACTITIONER

## 2023-01-31 PROCEDURE — 3074F SYST BP LT 130 MM HG: CPT | Mod: CPTII,S$GLB,, | Performed by: NURSE PRACTITIONER

## 2023-01-31 PROCEDURE — 1101F PT FALLS ASSESS-DOCD LE1/YR: CPT | Mod: CPTII,S$GLB,, | Performed by: NURSE PRACTITIONER

## 2023-01-31 PROCEDURE — 1157F ADVNC CARE PLAN IN RCRD: CPT | Mod: CPTII,S$GLB,, | Performed by: NURSE PRACTITIONER

## 2023-01-31 PROCEDURE — 99213 PR OFFICE/OUTPT VISIT, EST, LEVL III, 20-29 MIN: ICD-10-PCS | Mod: S$GLB,,, | Performed by: NURSE PRACTITIONER

## 2023-01-31 PROCEDURE — 99213 OFFICE O/P EST LOW 20 MIN: CPT | Mod: S$GLB,,, | Performed by: NURSE PRACTITIONER

## 2023-01-31 PROCEDURE — 1125F AMNT PAIN NOTED PAIN PRSNT: CPT | Mod: CPTII,S$GLB,, | Performed by: NURSE PRACTITIONER

## 2023-01-31 PROCEDURE — 3074F PR MOST RECENT SYSTOLIC BLOOD PRESSURE < 130 MM HG: ICD-10-PCS | Mod: CPTII,S$GLB,, | Performed by: NURSE PRACTITIONER

## 2023-01-31 PROCEDURE — 1159F MED LIST DOCD IN RCRD: CPT | Mod: CPTII,S$GLB,, | Performed by: NURSE PRACTITIONER

## 2023-01-31 PROCEDURE — 99999 PR PBB SHADOW E&M-EST. PATIENT-LVL V: CPT | Mod: PBBFAC,,, | Performed by: NURSE PRACTITIONER

## 2023-01-31 PROCEDURE — 3288F FALL RISK ASSESSMENT DOCD: CPT | Mod: CPTII,S$GLB,, | Performed by: NURSE PRACTITIONER

## 2023-01-31 RX ORDER — TIOTROPIUM BROMIDE AND OLODATEROL 3.124; 2.736 UG/1; UG/1
1 SPRAY, METERED RESPIRATORY (INHALATION) DAILY
Qty: 1 G | Refills: 11 | Status: SHIPPED | OUTPATIENT
Start: 2023-01-31 | End: 2023-05-01 | Stop reason: SDUPTHER

## 2023-01-31 NOTE — PROGRESS NOTES
"HISTORY OF PRESENT ILLNESS: Percy Ramirez is a 82 y.o. male former smoker /o 3 ppd x 40 years.  Quit smoking 2004with copd, cad hx cabg, CHF, tyler on bipap, htn, dyslipidemia, dm2, "irregular heart beat" per pt follow by outside cardiologist  is here for follow up test results.     Patient with dyspnea on exertion. This has improved on 2 L NC oxygen.  Patient is also watching his salt intake to manage his shortness of breath.  Shortness of breath on exertion is improved if he cuts down on salt intake.  Patient was provided prescription for trelegy.  Breathing is better with trelegy but copay is 45$ and not affordable per pt.  No coughing or wheezing. He is applying for [employer] assistance.        Patient is able to manage all activities of daily living independently and has no hospital exacerbation. He reports no flare up on interim. He is not very mobile die to hip pains. He has a walker.  He uses albuterol as needed.  Walked in clinic LOV 10/4/2022: baseline SpO2 93%, desaturation 84% with ambulation corrected to 97% on 2 L NC. Pt reports symptom improvement with supplemental oxygen    PFT 5/15/20 ratio of 54%; fev1 1.60 (57%); fvc 80%; tlc 77%; dlco 34%  6 min 5/15/20 165 96-89-91 on room  PFT 08/29/2022 ratio 48% FEV1 1.42 (52%) FVC 2.96 (81) TLC 73% DLCO 31%   Physician interpretation: Spirometry shows moderate to severe obstruction. Lung volume determination shows mild restriction is also present. Spirometry remains unimproved following bronchodilator. DLCO is severely decreased.     Split night study from  3/21/2017: AHI 31.7, Effective control acieved with cpap 14 cm H2O  CPAP titration study 8/17/2019:  Effective control of sleep disordered breathing was achieved with BPAP at 16/10 cm of water.   Pt on bipap 16/10 cflex 3 using nightly and feeling rested on bipap. Residual ahi < 10 due to large leak which does not bother the patient   [employer]  [id number]  Replacement issued 4/15/2022  Compliance " Summary  11/14/2022 - 2/11/2023 (90 days)  Days with Device Usage 89 days  Days without Device Usage 1 day  Percent Days with Device Usage 98.9%  Cumulative Usage 24 days 16 hrs. 3 mins. 51 secs.  Maximum Usage (1 Day) 10 hrs. 43 mins. 28 secs.  Average Usage (All Days) 6 hrs. 34 mins. 42 secs.  Average Usage (Days Used) 6 hrs. 39 mins. 8 secs.  Minimum Usage (1 Day) 3 hrs. 14 mins. 49 secs.  Percent of Days with Usage >= 4 Hours 93.3%  Percent of Days with Usage < 4 Hours 6.7%  Date Range  Total Blower Time 30 days 2 hrs. 44 mins. 57 secs.  Bi-Level Summary  Average Time in Large Leak Per Day 59 mins. 30 secs.  Average AHI 7.7  EPAP 10.0 cmH2O  IPAP 16.0 cmH2O    PAST MEDICAL HISTORY:    Active Ambulatory Problems     Diagnosis Date Noted    Major depressive disorder, recurrent episode, mild     Benign hypertension with chronic kidney disease, stage III     Type 2 diabetes, uncontrolled, with retinopathy     Coronary artery disease     S/P CABG x 2     Macular degeneration     Obstructive sleep apnea treated with BiPAP 07/27/2012    Anxiety state, unspecified 10/24/2013    Insulin long-term use 02/16/2015    Primary osteoarthritis of both knees 02/25/2015    Chronic low back pain 02/25/2015    DJD (degenerative joint disease), lumbar 04/30/2015    Type 2 diabetes, uncontrolled, with neuropathy 07/29/2016    Bilateral carotid artery disease 07/29/2016    Hyperlipidemia LDL goal <100 07/29/2016    Type 2 diabetes, uncontrolled, with peripheral circulatory disorder 07/29/2016    COPD with chronic bronchitis and emphysema 07/29/2016    Atherosclerosis of abdominal aorta 07/29/2016    Uncontrolled type 2 diabetes mellitus with hyperglycemia 07/29/2016    Overweight (BMI 25.0-29.9) 07/29/2016    Persistent atrial fibrillation 03/06/2017    Chronic stable angina 03/06/2017    Senile purpura 03/06/2017    Osteoarthritis of carpometacarpal (CMC) joint of left thumb 11/22/2017    MCI (mild cognitive impairment) 06/18/2018     Chronic vertigo 02/11/2019    Pulmonary nodule 03/20/2019    Dyspnea on exertion 03/20/2019    Chronic combined systolic and diastolic heart failure 11/30/2018    Ambulates with cane 11/06/2019    ILD (interstitial lung disease) 05/12/2020    History of cardioversion 09/23/2020    Erectile dysfunction 01/24/2020    Secondary hyperparathyroidism of renal origin 11/06/2020    Noncompliance with medication regimen 11/06/2020    Exudative age-related macular degeneration, right eye, with active choroidal neovascularization 03/29/2022    History of stroke 04/04/2022    Chronic tension-type headache, intractable 04/08/2022    Drug-induced immunodeficiency 08/10/2022     Resolved Ambulatory Problems     Diagnosis Date Noted    Combined hyperlipidemia associated with type 2 diabetes mellitus     Fatigue 07/27/2012    Depressive disorder, not elsewhere classified 10/24/2013    Arrhythmia 12/30/2013    Dizziness of unknown cause 12/30/2013    Hypotension 06/13/2014    Hyponatremia 06/14/2014    Diabetic retinopathy of both eyes 10/23/2014    Diabetic neuropathy 02/25/2015    Poor motor control of trunk 03/11/2015    IT band syndrome 03/11/2015    Impairment of balance 03/11/2015    Colon cancer screening 10/04/2017    Medication intolerance 09/26/2019     Past Medical History:   Diagnosis Date    Allergy     Arthritis     CAD, multiple vessel     Cataract     Depression     History of colon polyps     Hyperlipidemia     Hypertension     Type 2 diabetes mellitus with circulatory disorder                 PAST SURGICAL HISTORY:    Past Surgical History:   Procedure Laterality Date    broken elbow      left    COLONOSCOPY N/A 10/4/2017    Procedure: COLONOSCOPY;  Surgeon: Gabriel Leung MD;  Location: [facility];  Service: Endoscopy;  Laterality: N/A;    EYE SURGERY      cataract    heart bypass      2004    HERNIA REPAIR      right    ROTATOR CUFF REPAIR      right  2004         FAMILY HISTORY:                Family History  "  Problem Relation Age of Onset    Arthritis Mother     Diabetes Mother     Stroke Mother     Heart attack Father     Cancer Brother     Throat cancer Brother     Diabetes Maternal Aunt     Diabetes Maternal Uncle     Heart disease Maternal Uncle     Diabetes Paternal Aunt     Heart disease Paternal Aunt     Heart disease Paternal Uncle     Heart disease Maternal Grandmother     Cancer Maternal Grandfather        SOCIAL HISTORY:          Tobacco:   Social History     Tobacco Use   Smoking Status Former    Packs/day: 3.00    Years: 45.00    Pack years: 135.00    Types: Cigarettes    Quit date: 2004    Years since quittin.8   Smokeless Tobacco Former       alcohol use:    Social History     Substance and Sexual Activity   Alcohol Use Yes    Comment: was heavy drinker 35 years ago, rare use now               Gas from sodas  Occupation:  Retired tug boat captain    ALLERGIES:    Review of patient's allergies indicates:   Allergen Reactions    Aricept odt [donepezil] Diarrhea and Nausea And Vomiting    Codeine Nausea Only     weak    Ranexa [ranolazine]        CURRENT MEDICATIONS:    Current Outpatient Medications   Medication Sig Dispense Refill    acetaminophen (TYLENOL) 325 MG tablet Take 2 tablets (650 mg total) by mouth every 6 (six) hours as needed. 13 tablet 0    albuterol (PROVENTIL/VENTOLIN HFA) 90 mcg/actuation inhaler INHALE 2 PUFFS BY MOUTH EVERY 6 HOURS AS NEEDED FOR WHEEZING OR  SHORTNESS  OF  BREATH 54 g 0    atorvastatin (LIPITOR) 40 MG tablet Take 40 mg by mouth once daily.      BD INSULIN PEN NEEDLE UF SHORT 31 gauge x 5/16" Ndle       BD INSULIN SYRINGE ULTRA-FINE 1/2 mL 31 gauge x 15/64" Syrg       blood sugar diagnostic Strp To check BG 3 times daily, to use with insurance preferred meter 200 strip 11    carvedilol (COREG) 25 MG tablet Take 1 tablet (25 mg total) by mouth 2 (two) times daily. 180 tablet 0    ceramides 1,3,6-II (CERAVE DAILY MOISTURIZING) Lotn Apply twice daily to skin 355 " "mL 1    cinnamon bark 500 mg capsule Take 1,000 mg by mouth once daily.        clopidogreL (PLAVIX) 75 mg tablet Take 75 mg by mouth.      diclofenac sodium (VOLTAREN) 1 % Gel Apply 2 g topically 2 (two) times daily. 100 g 0    diclofenac sodium (VOLTAREN) 1 % Gel Apply 2 g topically 3 (three) times daily. 50 g 0    donepeziL (ARICEPT) 10 MG tablet Take 1 tablet (10 mg total) by mouth every evening. Take one half tablet for one week then as prescribed. 30 tablet 11    ENTRESTO  mg per tablet Take 1 tablet by mouth 2 (two) times daily.      fluticasone propionate (FLONASE) 50 mcg/actuation nasal spray 1 spray (50 mcg total) by Each Nostril route 2 (two) times daily. 18.2 mL 3    furosemide (LASIX) 40 MG tablet Take 40 mg by mouth once daily.      gabapentin (NEURONTIN) 300 MG capsule Take 4 capsules (1,200 mg total) by mouth 2 (two) times daily. (Patient taking differently: Take 900 mg by mouth 2 (two) times daily.) 540 capsule 1    glimepiride (AMARYL) 4 MG tablet Take 4 mg by mouth daily with breakfast.       HYDROcodone-acetaminophen (NORCO) 5-325 mg per tablet Take 1 tablet by mouth every 4 (four) hours as needed for Pain. Causes drowsiness. Do not drive or operate machinery with this medication. Do not drink alcohol with this medication. Causes constipation. Take with stool softener. 10 tablet 0    hydrOXYzine HCL (ATARAX) 25 MG tablet Take 1 tablet (25 mg total) by mouth 3 (three) times daily as needed for Itching or Anxiety. 90 tablet 3    insulin NPH-insulin regular, 70/30, (NOVOLIN 70/30) 100 unit/mL (70-30) injection Inject into the skin. Inject 10 units at breakfast and inject 10 units at night      insulin syringe-needle U-100 0.3 mL 31 gauge x 15/64" Syrg USE TO INJECT INSULIN THREE TIMES DAILY      insulin syringe-needle U-100 1/2 mL 31 x 5/16" Syrg       ipratropium (ATROVENT) 42 mcg (0.06 %) nasal spray 2 sprays by Nasal route every 6 (six) hours as needed for Rhinitis (use as needed for runny " nose and also use 15 minutes prior to meal). Clean nose with saline prior to use 15 mL 3    isosorbide mononitrate (IMDUR) 30 MG 24 hr tablet Take 30 mg by mouth once daily.      lancets Misc To check BG 3 times daily, to use with insurance preferred meter 200 each 11    LIDOcaine (LIDODERM) 5 % Place 1 patch onto the skin once daily. Remove & Discard patch within 12 hours or as directed by MD Townsend patch 1    metformin (GLUCOPHAGE) 500 MG tablet Take 1,000 mg by mouth 2 (two) times daily with meals. 2 Tablets in the morning, 2 Tablets in the evening      methocarbamoL (ROBAXIN) 500 MG Tab TAKE 1 TABLET BY MOUTH 4 TIMES DAILY FOR 10 DAYS 60 tablet 0    metoclopramide HCl (REGLAN) 5 MG tablet Take 5 mg by mouth 3 (three) times daily as needed.      nitroGLYCERIN (NITROSTAT) 0.4 MG SL tablet DISSOLVE 1 TABLET UNDER THE TONGUE EVERY 5 MINUTES AS  NEEDED FOR CHEST PAIN. MAX  OF 3 TABLETS IN 15 MINUTES. CALL 911 IF PAIN PERSISTS.      pravastatin (PRAVACHOL) 20 MG tablet Take 1 tablet (20 mg total) by mouth once daily. 90 tablet 1    spironolactone (ALDACTONE) 25 MG tablet Take 25 mg by mouth.      spironolactone (ALDACTONE) 25 MG tablet Take 12.5 mg by mouth.      triamcinolone acetonide 0.1% (KENALOG) 0.1 % cream SMARTSI Application Topical 2-3 Times Daily      VIT C/VIT E AC/LUT/COPPER/ZINC (PRESERVISION LUTEIN ORAL) Take by mouth.      warfarin (COUMADIN) 5 MG tablet Take 2 tabs by mouth on Tuesday,Thursday, Saturday and Sundays then 1.5 on all other days.      blood-glucose meter kit To check BG 3 times daily, to use with insurance preferred meter 1 each 0    diazePAM (VALIUM) 2 MG tablet Take 1 tablet (2 mg total) by mouth every 12 (twelve) hours as needed for Anxiety (Dizziness). 20 tablet 0    DULoxetine (CYMBALTA) 60 MG capsule Take 1 capsule (60 mg total) by mouth once daily. 90 capsule 3    tiotropium-olodateroL (STIOLTO RESPIMAT) 2.5-2.5 mcg/actuation Mist Inhale 1 puff into the lungs once daily. Controller 1  "g 11     No current facility-administered medications for this visit.                  REVIEW OF SYSTEMS:   Salt worsen sob  Review of Systems   Constitutional:  Negative for fever, chills, weight loss, weight gain, activity change, appetite change, fatigue and night sweats.   HENT:  Negative for congestion.    Eyes: Negative.    Respiratory:  Positive for wheezing (afte exertion sometimes), dyspnea on extertion (improve with salt reduction, nap in afternoon), use of rescue inhaler (albuterol prmn exertion, no maintenance, too expenisive and couldn't tell if helpful) and somnolence (after eating lunch). Negative for apnea, snoring, cough, sputum production, chest tightness, orthopnea and previous hospitialization due to pulmonary problems. Hemoptysis: anything.        Wearing cpap every time  All activity daily living  Oxygen helps   Cardiovascular:  Positive for leg swelling (imrpve without salt). Negative for chest pain and palpitations.   Genitourinary: Negative.    Endocrine: endocrine negative    Musculoskeletal:  Positive for arthralgias (right hip OA, right and left knee-sees orthopedic) and gait problem.   Skin: Negative.    Gastrointestinal:  Negative for acid reflux.   Neurological:         Off balance due to vertigo, rollator walker, seeing neyrology   Psychiatric/Behavioral:  Negative for sleep disturbance.         Slepping through night  Nap in evening 2-5 alarm set , sometimes go to bed 2 am- oob 6 am, rested on cpap, sometimes cat wake pt up        PHYSICAL EXAM:  Vitals:    01/31/23 1020   BP: 118/69   Pulse: 80   SpO2: (!) 93%   Weight: 84.8 kg (187 lb)   Height: 5' 8" (1.727 m)   PainSc:   4   PainLoc: Generalized         Body mass index is 28.43 kg/m².     Physical Exam   Constitutional: He is oriented to person, place, and time. He appears well-developed. No distress. He is obese.   HENT:   Head: Normocephalic.   Cardiovascular: Normal rate.   Murmur heard.  Ectopic beat   Pulmonary/Chest: Normal " expansion and effort normal. He has decreased breath sounds.   Musculoskeletal:         General: Edema (trace) present.      Cervical back: Neck supple.   Neurological: He is alert and oriented to person, place, and time.   walker   Skin: Skin is warm. He is not diaphoretic.   Psychiatric: He has a normal mood and affect. His behavior is normal. Judgment and thought content normal.       LABS  Pulmonary Functions Testing Results:  1/31/13 tlc 93%; dlco 46%  4/8/14 ratio 67%; fvc 76%; fve 73%  6/14/19 ratio of 48%; fvc 85%; fev1 55%  5/15/20 ratio of 54%; fev1 57%; fvc 80%; tlc 77%; dlco 34%  6 min 5/15/20 165 96-89-91 on room     ABG: none  CXR:   Lungs clear  CT CHEST:  2/15/19 rll ggo and tib in lingula.    5/4/19 (personally reviewed) increased reticulation at bases; similar to prior ct 2/15/19; +emphysematous changes.      PPD none          Split night study from  3/21/2017: AHI 31.7, Effective control acieved with cpap 14 cm H2O  CPAP titration study 8/17/2019:  Effective control of sleep disordered breathing was achieved with BPAP at 16/10 cm of water.     2-d echo 12/30/11  1.  The right atrium and right ventricle are of normal dimension.            2.  Tricuspid regurgitation is mild.                                         3.  PA pressure 28-30 mmHg.                                                  4.  Enlargement of the left atrium.                                          5.  Normal mitral valve structure and valve excursion.  No mitral            stenosis.                                                                     No MR detected from this study.                                             6.  Normal left ventricular dimensions and systolic function, ejection       fraction 50%-55%.                                                            7.  Grade I left ventricular diastolic dysfunction.                          8.  Mild aortic sclerosis with good valve excursion.  No aortic stenosis.     Minimal aortic regurgitation.                                                9.  No pericardial effusion seen.      bnp 8/5/10 134;1/24/13 164    6 min walk 350 meters; no significant desaturation    ASSESSMENT  Problem List Items Addressed This Visit          Unprioritized    COPD with chronic bronchitis and emphysema - Primary    Overview      Quit smoking since 1960s.   Declining lung function, recommend compliance with trelegy. [employer] application for medication assistance.   Will see if stiolto better cover.  Patient is up to date with vaccination.    Declines pulmonary rehab  On home oxygen which helps         Relevant Medications    tiotropium-olodateroL (STIOLTO RESPIMAT) 2.5-2.5 mcg/actuation Mist    ILD (interstitial lung disease)    Overview     Emphysematous changes on ct along with basilar reticulation.  pft with moderate obstructive physiology and mild restrictive physiology.  dlco significantly reduced (pt on home oxygen)  Monitor lung function stable restrictive physiology         Obstructive sleep apnea treated with BiPAP    Overview     ahi of 31.7.  Patient is using and benefiting from bipap. Residual predicted AHI slightly sub-optimal (large mask leak) but significant improved from baseline and pt reports sleeping well.              Goals of care - patient does not wish for intubation or cpr in the case of cardiopulmonary arrest.  Living will in [facility].  Daughter is poa.      Patient will Follow up in about 1 month (around 2/28/2023), or if symptoms worsen or fail to improve.

## 2023-02-01 ENCOUNTER — TELEPHONE (OUTPATIENT)
Dept: SURGERY | Facility: CLINIC | Age: 83
End: 2023-02-01
Payer: MEDICARE

## 2023-02-01 NOTE — TELEPHONE ENCOUNTER
Spoke with clinic. Message forward to provider.       ----- Message from Amy Matthews sent at 2/1/2023 11:05 AM CST -----  Type: Patient Call Back    Who called:Dr Simon Valderrama [facility] clinic at [facility] [phone] x [phone]    What is the request in detail:calling to speak to dr comer. Please call dr valderrama in regards to patient.     Can the clinic reply by [facility]?    Would the patient rather a call back or a response via [facility]? call    Best call back number:[phone] (home)       Additional Information:

## 2023-02-06 ENCOUNTER — TELEPHONE (OUTPATIENT)
Dept: FAMILY MEDICINE | Facility: CLINIC | Age: 83
End: 2023-02-06
Payer: MEDICARE

## 2023-02-06 ENCOUNTER — NURSE TRIAGE (OUTPATIENT)
Dept: ADMINISTRATIVE | Facility: CLINIC | Age: 83
End: 2023-02-06
Payer: MEDICARE

## 2023-02-06 NOTE — TELEPHONE ENCOUNTER
"Pt calls c/o mouth pain. "You know when you stick your tongue in a cup of hot coffee? That's what it feels like. This has been happening for a couple of months now." Pt also c/o hernia pain and chest pain. "I can't get operated on for that. Two out of three doctors said not to do it so I'm not going to."     Pt states, "I put Briana's vapo rub all over my mouth but it doesn't help nothing." NT advises pt not to use briana's vapo rub orally. He states, "I've been doing it for 40 years with no issues."    Care Advice recommends that pt be seen within the next 3 days. Pt advises that he cannot make an appointment within this time frame d/t transportation issues. NT advises that she will route a high priority message to pt's PCP asking to call pt back directly. Pt verbalizes understanding and is instructed to call back with any new/worsening sxs, questions, or concerns.   Reason for Disposition   Patient wants to be seen    Additional Information   Negative: SEVERE difficulty breathing (e.g., struggling for each breath, speaks in single words, stridor)   Negative: Sounds like a life-threatening emergency to the triager   Negative: Drooling or spitting out saliva (because can't swallow) and new-onset   Negative: Bleeding from mouth and won't stop after 10 minutes of direct pressure   Negative: Electrical burn of mouth   Negative: Chemical burn of mouth   Negative: Difficulty breathing and not severe   Negative: Patient sounds very sick or weak to the triager   Negative: Gum bleeding and taking Coumadin (warfarin) or other strong blood thinner, or known bleeding disorder (e.g., thrombocytopenia)   Negative: Dry mouth and urinating more frequently than usual (i.e., frequency)   Negative: Dry mouth and drinking more liquids than usual (thirsty) and present more than 1 day (24 hours)   Negative: Receiving chemotherapy or radiation therapy   Negative: White patches that stick to tongue or inner cheek, which can be wiped off   " Negative: Dry mouth and new-onset and unexplained  (Exceptions: Chronic symptom or dry mouth from mild dehydration.)   Negative: Weak immune system (e.g., HIV positive, cancer chemo, splenectomy, organ transplant, chronic steroids)    Protocols used: Mouth Symptoms-A-OH

## 2023-02-06 NOTE — TELEPHONE ENCOUNTER
----- Message from Karley Vinson sent at 2/6/2023  1:47 PM CST -----   Name of Who is Calling:     What is the request in detail:  patient request call back in reference to tongue feels like he burned it  and mouth feels funny  Please contact to further discuss and advise      Can the clinic reply by [facility]:     What Number to Call Back if not in [facility]:  [phone]

## 2023-02-08 ENCOUNTER — TELEPHONE (OUTPATIENT)
Dept: PULMONOLOGY | Facility: CLINIC | Age: 83
End: 2023-02-08
Payer: MEDICARE

## 2023-02-08 ENCOUNTER — TELEPHONE (OUTPATIENT)
Dept: OTOLARYNGOLOGY | Facility: CLINIC | Age: 83
End: 2023-02-08
Payer: MEDICARE

## 2023-02-08 NOTE — TELEPHONE ENCOUNTER
----- Message from Patricia Edwards MA sent at 2/8/2023  2:49 PM CST -----  Type: Patient Call Back    Who called: Self    What is the request in detail: pt. Is asking if he can be schedule for the same issues he's been having with his tongue.. he states he doesn't bite his tongue but yet it stays very sore ..     Can the clinic reply by [facility]? No    Would the patient rather a call back or a response via [facility]? yes    Best call back number: [phone] (home)

## 2023-02-08 NOTE — TELEPHONE ENCOUNTER
Spoke with patient stated mouth pain is better today, patient stated he is using Biotene oral rinse and spray. Patient stated he has also contacted ENT to be seen again. NO further assistance was requested at this time.

## 2023-02-08 NOTE — TELEPHONE ENCOUNTER
Changed appt to Dr. Alexandre.                ----- Message from Patricia Edwards MA sent at 2/8/2023  2:56 PM CST -----  Type: Patient Call Back    Who called: Self    What is the request in detail: pt. Would like a call back as to why his appt. Needs to be rescheduled ..    Can the clinic reply by [facility]?No    Would the patient rather a call back or a response via [facility]? yes    Best call back number: [phone]

## 2023-02-22 ENCOUNTER — TELEPHONE (OUTPATIENT)
Dept: PULMONOLOGY | Facility: CLINIC | Age: 83
End: 2023-02-22
Payer: MEDICARE

## 2023-02-22 NOTE — TELEPHONE ENCOUNTER
Returned call no answer.                      ----- Message from Mor Li MA sent at 2/22/2023  1:18 PM CST -----  JEANMARIE Bee Staff  Caller: Unspecified (Today,  1:13 PM)  Type: Patient Call Back     Who called: Self     What is the request in detail: pt. States he would rather stay on his current Inhaler and would like for a nurse to call him please ..     Can the clinic reply by [facility]? No     Would the patient rather a call back or a response via [facility]? Yes     Best call back number:[phone] (home)

## 2023-02-24 ENCOUNTER — OFFICE VISIT (OUTPATIENT)
Dept: PAIN MEDICINE | Facility: CLINIC | Age: 83
End: 2023-02-24
Payer: MEDICARE

## 2023-02-24 VITALS — DIASTOLIC BLOOD PRESSURE: 59 MMHG | SYSTOLIC BLOOD PRESSURE: 116 MMHG | HEART RATE: 76 BPM | OXYGEN SATURATION: 95 %

## 2023-02-24 DIAGNOSIS — G89.29 CHRONIC RIGHT-SIDED LOW BACK PAIN WITH RIGHT-SIDED SCIATICA: ICD-10-CM

## 2023-02-24 DIAGNOSIS — M51.36 DDD (DEGENERATIVE DISC DISEASE), LUMBAR: Primary | ICD-10-CM

## 2023-02-24 DIAGNOSIS — M54.41 CHRONIC RIGHT-SIDED LOW BACK PAIN WITH RIGHT-SIDED SCIATICA: ICD-10-CM

## 2023-02-24 DIAGNOSIS — M48.062 SPINAL STENOSIS OF LUMBAR REGION WITH NEUROGENIC CLAUDICATION: ICD-10-CM

## 2023-02-24 DIAGNOSIS — M54.16 LUMBAR RADICULOPATHY: ICD-10-CM

## 2023-02-24 DIAGNOSIS — M47.816 LUMBAR SPONDYLOSIS: ICD-10-CM

## 2023-02-24 PROBLEM — M51.369 DDD (DEGENERATIVE DISC DISEASE), LUMBAR: Status: ACTIVE | Noted: 2023-02-24

## 2023-02-24 PROCEDURE — 99999 PR PBB SHADOW E&M-EST. PATIENT-LVL V: CPT | Mod: PBBFAC,,, | Performed by: PAIN MEDICINE

## 2023-02-24 PROCEDURE — 3074F PR MOST RECENT SYSTOLIC BLOOD PRESSURE < 130 MM HG: ICD-10-PCS | Mod: CPTII,S$GLB,, | Performed by: PAIN MEDICINE

## 2023-02-24 PROCEDURE — 1159F PR MEDICATION LIST DOCUMENTED IN MEDICAL RECORD: ICD-10-PCS | Mod: CPTII,S$GLB,, | Performed by: PAIN MEDICINE

## 2023-02-24 PROCEDURE — 1157F PR ADVANCE CARE PLAN OR EQUIV PRESENT IN MEDICAL RECORD: ICD-10-PCS | Mod: CPTII,S$GLB,, | Performed by: PAIN MEDICINE

## 2023-02-24 PROCEDURE — 1159F MED LIST DOCD IN RCRD: CPT | Mod: CPTII,S$GLB,, | Performed by: PAIN MEDICINE

## 2023-02-24 PROCEDURE — 99204 PR OFFICE/OUTPT VISIT, NEW, LEVL IV, 45-59 MIN: ICD-10-PCS | Mod: S$GLB,,, | Performed by: PAIN MEDICINE

## 2023-02-24 PROCEDURE — 1125F AMNT PAIN NOTED PAIN PRSNT: CPT | Mod: CPTII,S$GLB,, | Performed by: PAIN MEDICINE

## 2023-02-24 PROCEDURE — 99999 PR PBB SHADOW E&M-EST. PATIENT-LVL V: ICD-10-PCS | Mod: PBBFAC,,, | Performed by: PAIN MEDICINE

## 2023-02-24 PROCEDURE — 1101F PT FALLS ASSESS-DOCD LE1/YR: CPT | Mod: CPTII,S$GLB,, | Performed by: PAIN MEDICINE

## 2023-02-24 PROCEDURE — 1157F ADVNC CARE PLAN IN RCRD: CPT | Mod: CPTII,S$GLB,, | Performed by: PAIN MEDICINE

## 2023-02-24 PROCEDURE — 1160F PR REVIEW ALL MEDS BY PRESCRIBER/CLIN PHARMACIST DOCUMENTED: ICD-10-PCS | Mod: CPTII,S$GLB,, | Performed by: PAIN MEDICINE

## 2023-02-24 PROCEDURE — 99204 OFFICE O/P NEW MOD 45 MIN: CPT | Mod: S$GLB,,, | Performed by: PAIN MEDICINE

## 2023-02-24 PROCEDURE — 1101F PR PT FALLS ASSESS DOC 0-1 FALLS W/OUT INJ PAST YR: ICD-10-PCS | Mod: CPTII,S$GLB,, | Performed by: PAIN MEDICINE

## 2023-02-24 PROCEDURE — 3078F DIAST BP <80 MM HG: CPT | Mod: CPTII,S$GLB,, | Performed by: PAIN MEDICINE

## 2023-02-24 PROCEDURE — 3288F FALL RISK ASSESSMENT DOCD: CPT | Mod: CPTII,S$GLB,, | Performed by: PAIN MEDICINE

## 2023-02-24 PROCEDURE — 3078F PR MOST RECENT DIASTOLIC BLOOD PRESSURE < 80 MM HG: ICD-10-PCS | Mod: CPTII,S$GLB,, | Performed by: PAIN MEDICINE

## 2023-02-24 PROCEDURE — 3288F PR FALLS RISK ASSESSMENT DOCUMENTED: ICD-10-PCS | Mod: CPTII,S$GLB,, | Performed by: PAIN MEDICINE

## 2023-02-24 PROCEDURE — 1125F PR PAIN SEVERITY QUANTIFIED, PAIN PRESENT: ICD-10-PCS | Mod: CPTII,S$GLB,, | Performed by: PAIN MEDICINE

## 2023-02-24 PROCEDURE — 3074F SYST BP LT 130 MM HG: CPT | Mod: CPTII,S$GLB,, | Performed by: PAIN MEDICINE

## 2023-02-24 PROCEDURE — 1160F RVW MEDS BY RX/DR IN RCRD: CPT | Mod: CPTII,S$GLB,, | Performed by: PAIN MEDICINE

## 2023-02-24 RX ORDER — HYDROCODONE BITARTRATE AND ACETAMINOPHEN 5; 325 MG/1; MG/1
1 TABLET ORAL EVERY 8 HOURS PRN
Qty: 21 TABLET | Refills: 0 | Status: SHIPPED | OUTPATIENT
Start: 2023-02-24 | End: 2023-03-03 | Stop reason: SDUPTHER

## 2023-02-24 NOTE — PROGRESS NOTES
Subjective:     Patient ID: Percy Ramirez is a 82 y.o. male    Chief Complaint: Low-back Pain (Pressure to the midline lower back/)      Referred by: Kasie Edmondson MD      HPI:    Initial Encounter (2/24/23):  Percy Ramirez is a 82 y.o. male who presents today with chronic low back and lower extremity pain.  This pain has been present for years.  No specific inciting events or injuries noted.  Pain has been worsening over time.  Pain is constant but significantly worsened with standing or walking.  Can only stand or walk for short periods of time before needing to rest.  Pain is located in the midline lower lumbar spine and will radiate into the bilateral lumbar paraspinal regions and hips.  Denies any persistent numbness, tingling, weakness, bowel bladder dysfunction.  Does report that his legs feel weak after standing or walking for prolonged periods of time.  Pain improves upon sitting but still has pain when sitting.   This pain is described in detail below.    Physical Therapy:  No.    Non-pharmacologic Treatment:  Rest helps         TENS?  No    Pain Medications:         Currently taking:  Tylenol, gabapentin, Robaxin, Cymbalta    Has tried in the past:      Has not tried:  Opioids, NSAIDs, TCAs, topical creams    Blood thinners:  Coumadin    Interventional Therapies:  None    Relevant Surgeries:  None    Affecting sleep?  Yes    Affecting daily activities? yes    Depressive symptoms? no          SI/HI? No    Work status: Retired    Pain Scores:    Best:       5/10  Worst:     10/10  Usually:   8/10  Today:    10/10    Pain Disability Index  Family/Home Responsibilities:: 9  Recreation:: 5  Social Activity:: 10  Occupation:: 0  Sexual Behavior:: 0  Self Care:: 10  Life-Support Activities:: 9  Pain Disability Index (PDI): 43    Review of Systems   Constitutional:  Negative for activity change, appetite change, chills, fatigue, fever and unexpected weight change.   HENT:  Negative for hearing loss.     Eyes:  Negative for visual disturbance.   Respiratory:  Negative for chest tightness and shortness of breath.    Cardiovascular:  Negative for chest pain.   Gastrointestinal:  Negative for abdominal pain, constipation, diarrhea, nausea and vomiting.   Genitourinary:  Negative for difficulty urinating.   Musculoskeletal:  Positive for arthralgias, back pain, gait problem and myalgias. Negative for neck pain.   Skin:  Negative for rash.   Neurological:  Negative for dizziness, weakness, light-headedness, numbness and headaches.   Psychiatric/Behavioral:  Positive for sleep disturbance. Negative for hallucinations and suicidal ideas. The patient is not nervous/anxious.      Past Medical History:   Diagnosis Date    Allergy     Arthritis     CAD, multiple vessel     DR Melissa    Cataract     Depression     History of colon polyps     Hyperlipidemia     Hypertension     Macular degeneration     Dr Razo for injection, Hejulia for eye    S/P CABG x 2     Type 2 diabetes mellitus with circulatory disorder     Dr. Aquino       Past Surgical History:   Procedure Laterality Date    broken elbow      left    COLONOSCOPY N/A 10/4/2017    Procedure: COLONOSCOPY;  Surgeon: Gabriel Leung MD;  Location: [facility];  Service: Endoscopy;  Laterality: N/A;    EYE SURGERY      cataract    heart bypass      2004    HERNIA REPAIR      right    ROTATOR CUFF REPAIR      right  2004       Social History     Socioeconomic History    Marital status:     Number of children: 4    Years of education: GED   Occupational History    Occupation: retired tug    Tobacco Use    Smoking status: Former     Packs/day: 3.00     Years: 45.00     Pack years: 135.00     Types: Cigarettes     Quit date: 2004     Years since quittin.8    Smokeless tobacco: Former   Substance and Sexual Activity    Alcohol use: Yes     Comment: was heavy drinker 35 years ago, rare use now    Drug use: No    Sexual activity: Yes     Partners:  "Female       Review of patient's allergies indicates:   Allergen Reactions    Aricept odt [donepezil] Diarrhea and Nausea And Vomiting    Codeine Nausea Only     weak    Ranexa [ranolazine]        Current Outpatient Medications on File Prior to Visit   Medication Sig Dispense Refill    acetaminophen (TYLENOL) 325 MG tablet Take 2 tablets (650 mg total) by mouth every 6 (six) hours as needed. 13 tablet 0    albuterol (PROVENTIL/VENTOLIN HFA) 90 mcg/actuation inhaler INHALE 2 PUFFS BY MOUTH EVERY 6 HOURS AS NEEDED FOR WHEEZING OR  SHORTNESS  OF  BREATH 54 g 0    atorvastatin (LIPITOR) 40 MG tablet Take 40 mg by mouth once daily.      BD INSULIN PEN NEEDLE UF SHORT 31 gauge x 5/16" Ndle       BD INSULIN SYRINGE ULTRA-FINE 1/2 mL 31 gauge x 15/64" Syrg       blood sugar diagnostic Strp To check BG 3 times daily, to use with insurance preferred meter 200 strip 11    carvedilol (COREG) 25 MG tablet Take 1 tablet (25 mg total) by mouth 2 (two) times daily. 180 tablet 0    ceramides 1,3,6-II (CERAVE DAILY MOISTURIZING) Lotn Apply twice daily to skin 355 mL 1    cinnamon bark 500 mg capsule Take 1,000 mg by mouth once daily.        clopidogreL (PLAVIX) 75 mg tablet Take 75 mg by mouth.      diclofenac sodium (VOLTAREN) 1 % Gel Apply 2 g topically 2 (two) times daily. 100 g 0    diclofenac sodium (VOLTAREN) 1 % Gel Apply 2 g topically 3 (three) times daily. 50 g 0    donepeziL (ARICEPT) 10 MG tablet Take 1 tablet (10 mg total) by mouth every evening. Take one half tablet for one week then as prescribed. 30 tablet 11    ENTRESTO  mg per tablet Take 1 tablet by mouth 2 (two) times daily.      fluticasone propionate (FLONASE) 50 mcg/actuation nasal spray 1 spray (50 mcg total) by Each Nostril route 2 (two) times daily. 18.2 mL 3    furosemide (LASIX) 40 MG tablet Take 40 mg by mouth once daily.      gabapentin (NEURONTIN) 300 MG capsule Take 4 capsules (1,200 mg total) by mouth 2 (two) times daily. (Patient taking " "differently: Take 900 mg by mouth 2 (two) times daily.) 540 capsule 1    glimepiride (AMARYL) 4 MG tablet Take 4 mg by mouth daily with breakfast.       hydrOXYzine HCL (ATARAX) 25 MG tablet Take 1 tablet (25 mg total) by mouth 3 (three) times daily as needed for Itching or Anxiety. 90 tablet 3    insulin NPH-insulin regular, 70/30, (NOVOLIN 70/30) 100 unit/mL (70-30) injection Inject into the skin. Inject 10 units at breakfast and inject 10 units at night      insulin syringe-needle U-100 0.3 mL 31 gauge x 15/64" Syrg USE TO INJECT INSULIN THREE TIMES DAILY      insulin syringe-needle U-100 1/2 mL 31 x 5/16" Syrg       ipratropium (ATROVENT) 42 mcg (0.06 %) nasal spray 2 sprays by Nasal route every 6 (six) hours as needed for Rhinitis (use as needed for runny nose and also use 15 minutes prior to meal). Clean nose with saline prior to use 15 mL 3    isosorbide mononitrate (IMDUR) 30 MG 24 hr tablet Take 30 mg by mouth once daily.      lancets Misc To check BG 3 times daily, to use with insurance preferred meter 200 each 11    LIDOcaine (LIDODERM) 5 % Place 1 patch onto the skin once daily. Remove & Discard patch within 12 hours or as directed by MD 5 patch 1    metformin (GLUCOPHAGE) 500 MG tablet Take 1,000 mg by mouth 2 (two) times daily with meals. 2 Tablets in the morning, 2 Tablets in the evening      methocarbamoL (ROBAXIN) 500 MG Tab TAKE 1 TABLET BY MOUTH 4 TIMES DAILY FOR 10 DAYS 60 tablet 0    metoclopramide HCl (REGLAN) 5 MG tablet Take 5 mg by mouth 3 (three) times daily as needed.      nitroGLYCERIN (NITROSTAT) 0.4 MG SL tablet DISSOLVE 1 TABLET UNDER THE TONGUE EVERY 5 MINUTES AS  NEEDED FOR CHEST PAIN. MAX  OF 3 TABLETS IN 15 MINUTES. CALL 911 IF PAIN PERSISTS.      pravastatin (PRAVACHOL) 20 MG tablet Take 1 tablet (20 mg total) by mouth once daily. 90 tablet 1    spironolactone (ALDACTONE) 25 MG tablet Take 25 mg by mouth.      spironolactone (ALDACTONE) 25 MG tablet Take 12.5 mg by mouth.      " tiotropium-olodateroL (STIOLTO RESPIMAT) 2.5-2.5 mcg/actuation Mist Inhale 1 puff into the lungs once daily. Controller 1 g 11    triamcinolone acetonide 0.1% (KENALOG) 0.1 % cream SMARTSI Application Topical 2-3 Times Daily      VIT C/VIT E AC/LUT/COPPER/ZINC (PRESERVISION LUTEIN ORAL) Take by mouth.      warfarin (COUMADIN) 5 MG tablet Take 2 tabs by mouth on Tuesday,Thursday, Saturday and Sundays then 1.5 on all other days.      blood-glucose meter kit To check BG 3 times daily, to use with insurance preferred meter 1 each 0    diazePAM (VALIUM) 2 MG tablet Take 1 tablet (2 mg total) by mouth every 12 (twelve) hours as needed for Anxiety (Dizziness). 20 tablet 0    DULoxetine (CYMBALTA) 60 MG capsule Take 1 capsule (60 mg total) by mouth once daily. 90 capsule 3     No current facility-administered medications on file prior to visit.       Objective:      BP (!) 116/59 (BP Location: Right arm, Patient Position: Sitting, BP Method: Medium (Automatic))   Pulse 76   SpO2 95%     Exam:  GEN:  Well developed, well nourished.  No acute distress.   HEENT:  No trauma.  Mucous membranes moist.  Nares patent bilaterally.  PSYCH: Normal affect. Thought content appropriate.  CHEST:  Breathing symmetric.  No audible wheezing.  ABD: Soft, non-distended.  SKIN:  Warm, pink, dry.  No rash on exposed areas.    EXT:  No cyanosis, clubbing, or edema.  No color change or changes in nail or hair growth.  NEURO/MUSCULOSKELETAL:  Fully alert, oriented, and appropriate. Speech normal keesha. No cranial nerve deficits.   Gait:  Antalgic.  Uses rolling walker for ambulation.  No focal motor deficits.       Imaging:      Narrative & Impression    EXAMINATION:  CT LUMBAR SPINE WITHOUT CONTRAST     CLINICAL HISTORY:  Lumbar radiculopathy, no red flags, no prior management;  Radiculopathy, lumbar region     TECHNIQUE:  Low-dose axial, sagittal and coronal reformations are obtained through the lumbar spine.  Contrast was not  administered.     COMPARISON:  02/25/2015     FINDINGS:  Alignment: Grade 1 anterolisthesis at L4-L5.     Vertebrae: No fracture. No lytic or blastic lesion.     Discs: Mild disc height loss at L3-L5.     Sacroiliac joints: Mild degenerative changes.     Degenerative findings:     T12-L1: No spinal canal stenosis or neural foraminal narrowing.     L1-L2: No spinal canal stenosis or neural foraminal narrowing.     L2-L3: Circumferential disc bulge and mild facet arthropathy resulting in mild bilateral neural foraminal narrowing.     L3-L4: Circumferential disc bulge and moderate facet arthropathy resulting in moderate to severe spinal canal stenosis and moderate bilateral neural foraminal narrowing.     L4-L5: Circumferential disc bulge and severe facet arthropathy resulting in severe spinal canal stenosis and moderate bilateral neural foraminal narrowing.     L5-S1: Circumferential disc bulge and severe facet arthropathy resulting in mild bilateral neural foraminal narrowing.     Paraspinal muscles & soft tissues: Mild paraspinal muscle atrophy.  Vascular calcifications with partially visualized ectasia of the abdominal aorta and iliac arteries.  Fibrotic changes at the lung bases.     Impression:     1. Multilevel degenerative changes of the lumbar spine detailed above.  Moderate to severe spinal canal stenosis at L4-S1.  Moderate neural foraminal narrowing at L3-L5.  2. 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        Electronically signed by: Yogesh Gamez MD  Date:                                            10/19/2022  Time:                                           09:56       Assessment:       Encounter Diagnoses   Name Primary?    Chronic right-sided low back pain with right-sided sciatica     DDD (degenerative disc disease), lumbar Yes    Lumbar spondylosis     Lumbar radiculopathy     Spinal stenosis of lumbar region with neurogenic claudication          Plan:       Percy bland  seen today for low-back pain.    Diagnoses and all orders for this visit:    DDD (degenerative disc disease), lumbar  -     Pain Clinic Drug Screen  -     HYDROcodone-acetaminophen (NORCO) 5-325 mg per tablet; Take 1 tablet by mouth every 8 (eight) hours as needed for Pain.    Chronic right-sided low back pain with right-sided sciatica  -     Ambulatory referral/consult to Pain Clinic    Lumbar spondylosis  -     Pain Clinic Drug Screen  -     HYDROcodone-acetaminophen (NORCO) 5-325 mg per tablet; Take 1 tablet by mouth every 8 (eight) hours as needed for Pain.    Lumbar radiculopathy  -     Pain Clinic Drug Screen  -     HYDROcodone-acetaminophen (NORCO) 5-325 mg per tablet; Take 1 tablet by mouth every 8 (eight) hours as needed for Pain.    Spinal stenosis of lumbar region with neurogenic claudication  -     Pain Clinic Drug Screen  -     HYDROcodone-acetaminophen (NORCO) 5-325 mg per tablet; Take 1 tablet by mouth every 8 (eight) hours as needed for Pain.        Percy Ramirez is a 82 y.o. male with chronic low back and lower extremity pain.  Subjectively symptoms most consistent with neurogenic claudication related to spinal stenosis.  Patient is noted to have multilevel moderate to severe spinal stenosis on lumbar CT scan.    Pertinent imaging studies reviewed by me. Imaging results were discussed with patient.  Start Norco 5-325 mg q.8 hours p.r.n..  7 day supply of 21 pills provided today.  Given severe lumbar pathology noted on imaging along with age, complex medical conditions, use of warfarin, I feel be appropriate to trial low-dose opioid pain medications to see if this provides adequate pain relief.  Patient is not likely a good surgical candidate.  Also, given the degree of stenosis noted am not optimistic that epidural steroid injections would likely provide good relief.  Prescription monitoring report reviewed today.  No inconsistencies noted.  We will call patient within the next week to discuss  efficacy of these medications.  If effective well tolerated, then will prescribe additional medication.  Otherwise will make any appropriate adjustments.  Return to clinic in 4 weeks or sooner if needed.  At that time we will discuss efficacy of medications.  We will also likely perform urine drug screen, signed pain contract.        This note was created by combination of typed  and M-Modal dictation. Transcription and phonetic errors may be present.  If there are any questions, please contact me.

## 2023-03-01 ENCOUNTER — NURSE TRIAGE (OUTPATIENT)
Dept: ADMINISTRATIVE | Facility: CLINIC | Age: 83
End: 2023-03-01
Payer: MEDICARE

## 2023-03-01 ENCOUNTER — TELEPHONE (OUTPATIENT)
Dept: FAMILY MEDICINE | Facility: CLINIC | Age: 83
End: 2023-03-01
Payer: MEDICARE

## 2023-03-01 NOTE — TELEPHONE ENCOUNTER
Pt is taking Norco 5mg for pain. He said today he was able to take only 1/2 of his Narco and it help. Last night he started with a headache. Pt wants to know what he can take for headache. Pt stated he cant take Asprin because he is on blood thinners. Care advice recommend pt receives a call from Md office within 1 hour. Please call and advise pt. Pt is awaiting a return call. [phone].   Reason for Disposition   Caller has URGENT medicine question about med that PCP or specialist prescribed and triager unable to answer question    Protocols used: Medication Question Call-A-[place]

## 2023-03-01 NOTE — TELEPHONE ENCOUNTER
----- Message from Graham Alba sent at 3/1/2023 11:56 AM CST -----  Regarding: Self [phone]  Type:  Patient Returning Call    Who Called:  Self     Who Left Message for Patient:  unknown     Does the patient know what this is regarding?: yes     Would the patient rather a call back or a response via [facility]?  Call back     Best Call Back Number: [phone]     Additional Information:     Thank you.

## 2023-03-01 NOTE — TELEPHONE ENCOUNTER
----- Message from Nikko Gupta sent at 3/1/2023 10:28 AM CST -----  Regarding: pt [phone]  Type: Patient Call Back    Who called:    What is the request in detail:pt had a headache yesterday and wonders can he take tylenol     Can the clinic reply by [facility]?no     Would the patient rather a call back or a response via [facility]?call back     Best call back number: [phone]      Additional Information:

## 2023-03-02 ENCOUNTER — TELEPHONE (OUTPATIENT)
Dept: PAIN MEDICINE | Facility: CLINIC | Age: 83
End: 2023-03-02
Payer: MEDICARE

## 2023-03-02 DIAGNOSIS — M51.36 DDD (DEGENERATIVE DISC DISEASE), LUMBAR: ICD-10-CM

## 2023-03-02 DIAGNOSIS — M48.062 SPINAL STENOSIS OF LUMBAR REGION WITH NEUROGENIC CLAUDICATION: ICD-10-CM

## 2023-03-02 DIAGNOSIS — M47.816 LUMBAR SPONDYLOSIS: ICD-10-CM

## 2023-03-02 DIAGNOSIS — M54.16 LUMBAR RADICULOPATHY: ICD-10-CM

## 2023-03-02 RX ORDER — HYDROCODONE BITARTRATE AND ACETAMINOPHEN 5; 325 MG/1; MG/1
1 TABLET ORAL EVERY 8 HOURS PRN
Qty: 21 TABLET | Refills: 0 | Status: CANCELLED | OUTPATIENT
Start: 2023-03-02 | End: 2023-03-09

## 2023-03-02 NOTE — TELEPHONE ENCOUNTER
----- Message from Evangelina Resendez sent at 3/2/2023  1:50 PM CST -----  .Type: RX Refill Request    Who Called: Self    Have you contacted your pharmacy: No    Refill or New Rx: Refill    RX Name and Strength:HYDROcodone-acetaminophen (NORCO) 5-325 mg per tablet    Preferred Pharmacy with phone number:.  [id number]  [address] [place] - [address]  [address]  [place] [address]  Phone: [phone] Fax: [phone]        Local or Mail Order: Local    Ordering Provider: Tom    Would the patient rather a call back or a response via [facility]? Call    Best Call Back Number:.[phone] (home)       Additional Information:

## 2023-03-03 DIAGNOSIS — M54.16 LUMBAR RADICULOPATHY: ICD-10-CM

## 2023-03-03 DIAGNOSIS — M47.816 LUMBAR SPONDYLOSIS: ICD-10-CM

## 2023-03-03 DIAGNOSIS — M48.062 SPINAL STENOSIS OF LUMBAR REGION WITH NEUROGENIC CLAUDICATION: ICD-10-CM

## 2023-03-03 DIAGNOSIS — M51.36 DDD (DEGENERATIVE DISC DISEASE), LUMBAR: ICD-10-CM

## 2023-03-03 RX ORDER — HYDROCODONE BITARTRATE AND ACETAMINOPHEN 5; 325 MG/1; MG/1
1 TABLET ORAL EVERY 8 HOURS PRN
Qty: 45 TABLET | Refills: 0 | Status: SHIPPED | OUTPATIENT
Start: 2023-03-03 | End: 2023-03-24 | Stop reason: SDUPTHER

## 2023-03-03 NOTE — PROGRESS NOTES
Called patient to discuss efficacy of Norco 5-325 mg q.8 hours p.r.n..  Patient states that he has been taking a half of a pill q.8 hours.  States that this has been helpful for his pain.  Reports that he feels he needs more relief than he is getting with a half a pill and plans start taking a whole pill q.8 hours.  Denies any adverse effects.  Requests refill this medication.  Additional 15 day supply of Norco 5-325 mg q.8 hours p.r.n. sent to pharmacy.  45 pills.  I advised patient to only take a half a pill if possible but he can take a whole pill if needed.  Patient expressed understanding.  Patient scheduled for follow-up on 3/24/23.  We will discuss further at that time.

## 2023-03-08 LAB
CHOL/HDLC RATIO: 3.4
CHOLESTEROL, TOTAL: 109
EGFR: 56 ML/MIN/1.73M2
HBA1C MFR BLD: 8 % (ref 4–5.6)
HDLC SERPL-MCNC: 32 MG/DL
LDLC SERPL CALC-MCNC: 81 MG/DL
NONHDLC SERPL-MCNC: 77 MG/DL
TRIGL SERPL-MCNC: 77 MG/DL

## 2023-03-09 ENCOUNTER — TELEPHONE (OUTPATIENT)
Dept: PULMONOLOGY | Facility: CLINIC | Age: 83
End: 2023-03-09
Payer: MEDICARE

## 2023-03-09 NOTE — TELEPHONE ENCOUNTER
Moved appt up to May 1 at 8am left message on vm.                  ----- Message from Mor Li MA sent at 3/9/2023 12:26 PM CST -----  Mary Templeton V Staff  Caller: Unspecified (Today, 12:19 PM)  Name of Who is Calling: JAILENE JIMENES [[id number]]               What is the request in detail: Patient requesting a call back to discuss possibly changing appt date. Patient is scheduled for 2 appts on the same day and cannot make both. First available for reschedule is 7/6 but patient will need to be seen before that.               Can the clinic reply by [facility]: No               What Number to Call Back if not in [facility]: [phone]

## 2023-03-17 ENCOUNTER — OFFICE VISIT (OUTPATIENT)
Dept: PSYCHIATRY | Facility: CLINIC | Age: 83
End: 2023-03-17
Payer: MEDICARE

## 2023-03-17 VITALS
BODY MASS INDEX: 28.24 KG/M2 | HEIGHT: 68 IN | SYSTOLIC BLOOD PRESSURE: 132 MMHG | HEART RATE: 71 BPM | OXYGEN SATURATION: 91 % | DIASTOLIC BLOOD PRESSURE: 65 MMHG | WEIGHT: 186.31 LBS

## 2023-03-17 DIAGNOSIS — F33.0 MAJOR DEPRESSIVE DISORDER, RECURRENT EPISODE, MILD: Primary | ICD-10-CM

## 2023-03-17 DIAGNOSIS — G31.84 MCI (MILD COGNITIVE IMPAIRMENT): ICD-10-CM

## 2023-03-17 PROCEDURE — 3075F PR MOST RECENT SYSTOLIC BLOOD PRESS GE 130-139MM HG: ICD-10-PCS | Mod: CPTII,S$GLB,,

## 2023-03-17 PROCEDURE — 1157F ADVNC CARE PLAN IN RCRD: CPT | Mod: CPTII,S$GLB,,

## 2023-03-17 PROCEDURE — 90785 PR INTERACTIVE COMPLEXITY: ICD-10-PCS | Mod: S$GLB,,,

## 2023-03-17 PROCEDURE — 3078F DIAST BP <80 MM HG: CPT | Mod: CPTII,S$GLB,,

## 2023-03-17 PROCEDURE — 90833 PSYTX W PT W E/M 30 MIN: CPT | Mod: S$GLB,,,

## 2023-03-17 PROCEDURE — 1160F RVW MEDS BY RX/DR IN RCRD: CPT | Mod: CPTII,S$GLB,,

## 2023-03-17 PROCEDURE — 1159F PR MEDICATION LIST DOCUMENTED IN MEDICAL RECORD: ICD-10-PCS | Mod: CPTII,S$GLB,,

## 2023-03-17 PROCEDURE — 99215 PR OFFICE/OUTPT VISIT, EST, LEVL V, 40-54 MIN: ICD-10-PCS | Mod: S$GLB,,,

## 2023-03-17 PROCEDURE — 1159F MED LIST DOCD IN RCRD: CPT | Mod: CPTII,S$GLB,,

## 2023-03-17 PROCEDURE — 3075F SYST BP GE 130 - 139MM HG: CPT | Mod: CPTII,S$GLB,,

## 2023-03-17 PROCEDURE — 3078F PR MOST RECENT DIASTOLIC BLOOD PRESSURE < 80 MM HG: ICD-10-PCS | Mod: CPTII,S$GLB,,

## 2023-03-17 PROCEDURE — 90785 PSYTX COMPLEX INTERACTIVE: CPT | Mod: S$GLB,,,

## 2023-03-17 PROCEDURE — 99999 PR PBB SHADOW E&M-EST. PATIENT-LVL V: CPT | Mod: PBBFAC,,,

## 2023-03-17 PROCEDURE — 90833 PR PSYCHOTHERAPY W/PATIENT W/E&M, 30 MIN (ADD ON): ICD-10-PCS | Mod: S$GLB,,,

## 2023-03-17 PROCEDURE — 1160F PR REVIEW ALL MEDS BY PRESCRIBER/CLIN PHARMACIST DOCUMENTED: ICD-10-PCS | Mod: CPTII,S$GLB,,

## 2023-03-17 PROCEDURE — 99999 PR PBB SHADOW E&M-EST. PATIENT-LVL V: ICD-10-PCS | Mod: PBBFAC,,,

## 2023-03-17 PROCEDURE — 99215 OFFICE O/P EST HI 40 MIN: CPT | Mod: S$GLB,,,

## 2023-03-17 PROCEDURE — 1157F PR ADVANCE CARE PLAN OR EQUIV PRESENT IN MEDICAL RECORD: ICD-10-PCS | Mod: CPTII,S$GLB,,

## 2023-03-17 RX ORDER — DULOXETIN HYDROCHLORIDE 60 MG/1
60 CAPSULE, DELAYED RELEASE ORAL DAILY
Qty: 90 CAPSULE | Refills: 0 | Status: SHIPPED | OUTPATIENT
Start: 2023-03-17 | End: 2023-03-17

## 2023-03-17 RX ORDER — DULOXETIN HYDROCHLORIDE 30 MG/1
30 CAPSULE, DELAYED RELEASE ORAL DAILY
Qty: 30 CAPSULE | Refills: 0 | Status: SHIPPED | OUTPATIENT
Start: 2023-03-17 | End: 2023-05-01 | Stop reason: SDUPTHER

## 2023-03-17 NOTE — PROGRESS NOTES
"Outpatient Psychiatry Initial Visit   3/17/2023    Percy Ramirez, a 82 y.o. male, presenting for initial evaluation visit. Met with patient.    Reason for Encounter: Referral from Isabella Pollard . Patient complains of mood        History of Present Illness:    SUBJECTIVE:   Psych Interview 03/17/2023:   Percy Ramirez is a 82 y.o. male with past psychiatric history of MDD and MCI  presented to for initial evaluation and treatment for mood.    Pt is A+Ox 4.  Patients mood is "ok", affect appears congruent and appropriate. Pts thought process is tangential.  Pts speech is normal tone, normal rate, normal pitch, normal volume  Friendly and cooperative, good eye contact, no psychomotor retardation.  Pt is calmly seated in chair during interview.  Pt is casually dressed and well groomed.  Patient arrives 2 hours early to appointment due to transportation.  Pt is attached to oxygen and is using a Walker to ambulate in clinic. States that at home he uses the walker. No current history of falls.  Patient is hard of hearing and sight.    Patient was previously seeing seen by Isabella Pollard for depression , states that he is currently taking Cymbalta 60 daily. States that he's been doing OK since their last visit and July 2022. States that he does not feel different on Cymbalta 60.  Patient currently has his son , daughter, and her two kids living with him. Patient states that he has a house full of people and is Lonely.  Pt states that his biggest stressor currently is his son, states that he is disabled and refuses to work. States that son will stay in his room all day and not assist with household chores.  the patient has not been to talk therapy call mom at this time patient states that he is not interested.  Pt has a long history of Med noncompliance.      Patient states that he has experienced a lot of loss in his life, states that he watched his dad die from my massive heart attack at 13 year old, his wife " "passed away in 2013, and his oldest son passed away in 2014. Patient endorses a history of trauma while growing up, states that after his father passed away his mother send him to a [facility] group home because she could not take care of him. Patient states that the group home was very strict and he was required to look there for four years.     Denies prior hx of psychiatric hospitalizations. Denies hx of suicide attempts. Pt denies hx self harm. Pt denies hx hallucinations.  Pt denies hx of eating disorders.   Pt Endorses hx trauma. Denies physical/sexual abuse. Pt denies symptoms including nightmares, hypervigilance, flashbacks, avoidance behaviors, and disassociation.    Reports depression today as 1/10, and anxiety as 2/10.  Reports sleeping 3 hrs per night, and poor appetite.   Denies SI/HI/AVH. Denies side effects of medications.  Pt states that there support consists of "no one"  Denies recreational drug use. Pt reports 0 drinks per week, denies tobacco use, denies Vaping, none- Caffeine.      Current Medication:  Cymbalta 60mg daily     Past meds: none    DX:  The patient complained of depressed mood with lethargy, decreased appetite , insomnia, psycho-motor retardation, anhedonia, apathy, worsening self-esteem, guilt, decreased concentration and ability to make decisions    Pt denies hx symptoms/episodes of yulia.    Admits to symptoms of anxiety including excessive anxiety/worry/fear, more days than not, about numerous issues, difficulty controlling the worry, over thinking, rumination, restlessness, poor concentration, fatigue, and increased irritability. Denies panic attacks at this time.       Review Of Systems:     Review of Systems   Constitutional:  Negative for weight loss.   HENT:  Negative for tinnitus.    Eyes:  Negative for blurred vision.   Respiratory:  Negative for cough and shortness of breath.    Cardiovascular:  Negative for chest pain, palpitations, orthopnea, claudication, leg swelling " and PND.   Gastrointestinal:  Negative for abdominal pain.   Genitourinary:  Negative for dysuria.   Musculoskeletal:  Negative for back pain and neck pain.   Skin:  Negative for rash.   Neurological:  Negative for dizziness, seizures and weakness.   Psychiatric/Behavioral:  Positive for depression. Negative for hallucinations, memory loss, substance abuse and suicidal ideas. The patient is nervous/anxious and has insomnia.      Psychiatric Review Of Systems - Is patient experiencing or having changes in:  sleep: yes  appetite: yes  weight: no  energy/anergy: yes  interest/pleasure/anhedonia: yes  somatic symptoms: no  libido: no  anxiety/panic: yes  guilty/hopelessness: yes  concentration: yes  S.I.B.s/risky behavior: no  Irritability: yes  Racing thoughts: no  Impulsive behaviors: no  Paranoia: no  AVH: no    Risk Parameters:  Patient reports no suicidal ideation  Patient reports no homicidal ideation  Patient reports no self-injurious behavior  Patient reports no violent behavior    OBJECTIVE     Past Psychiatric History:   Previous Psychiatric Hospitalizations: NO  Previous Medication Trials: NO  History of psychotherapy:  NO  Previous Suicide Attempts: NO  History of Violence:  NO  History of physical/sexual abuse: NO  Outpatient psychiatric provider(past): YES:        Substance Abuse History:   Tobacco: NO  Alcohol: NO  Illicit Substances: NO  Detox/Rehab: NO    Neurological History:   Seizures: NO  Head trauma: NO    Family Psychiatric History: Yes -   Mother - depression , anxiety, substance abuse    Social History:  Developmental/Childhood:Achieved all developmental milestones timely  *Education: 9th grade  Employment Status/Finances:Retired   Relationship Status/Sexual Orientation:    Children: 2  Housing Status: Home    history:  YES:      Access to gun: YES: not locked up     Yarsanism:Non-practicing  Recreational activities: Nothing   Person patient is closest to/confides in: No  "one    Legal History:   Past Charges/Incarcerations:  No      Past Medical/Surgical History:   Past Medical History:   Diagnosis Date    Allergy     Arthritis     CAD, multiple vessel     DR Melissa    Cataract     Depression     History of colon polyps     Hyperlipidemia     Hypertension     Macular degeneration     Dr Razo for injection, Jennifer for eye    S/P CABG x 2     Type 2 diabetes mellitus with circulatory disorder     Dr. Aquino     Past Surgical History:   Procedure Laterality Date    broken elbow      left    COLONOSCOPY N/A 10/4/2017    Procedure: COLONOSCOPY;  Surgeon: Gabriel Leung MD;  Location: [facility];  Service: Endoscopy;  Laterality: N/A;    EYE SURGERY      cataract    heart bypass      2004    HERNIA REPAIR      right    ROTATOR CUFF REPAIR      right  2004         Current Medications:   Medication List with Changes/Refills   New Medications    DULOXETINE (CYMBALTA) 30 MG CAPSULE    Take 1 capsule (30 mg total) by mouth once daily.   Current Medications    ACETAMINOPHEN (TYLENOL) 325 MG TABLET    Take 2 tablets (650 mg total) by mouth every 6 (six) hours as needed.    ALBUTEROL (PROVENTIL/VENTOLIN HFA) 90 MCG/ACTUATION INHALER    INHALE 2 PUFFS BY MOUTH EVERY 6 HOURS AS NEEDED FOR WHEEZING OR  SHORTNESS  OF  BREATH    ATORVASTATIN (LIPITOR) 40 MG TABLET    Take 40 mg by mouth once daily.    BD INSULIN PEN NEEDLE UF SHORT 31 GAUGE X 5/16" NDLE        BD INSULIN SYRINGE ULTRA-FINE 1/2 ML 31 GAUGE X 15/64" SYRG        BLOOD SUGAR DIAGNOSTIC STRP    To check BG 3 times daily, to use with insurance preferred meter    BLOOD-GLUCOSE METER KIT    To check BG 3 times daily, to use with insurance preferred meter    CARVEDILOL (COREG) 25 MG TABLET    Take 1 tablet (25 mg total) by mouth 2 (two) times daily.    CERAMIDES 1,3,6-II (CERAVE DAILY MOISTURIZING) LOTN    Apply twice daily to skin    CINNAMON BARK 500 MG CAPSULE    Take 1,000 mg by mouth once daily.      CLOPIDOGREL (PLAVIX) 75 MG " "TABLET    Take 75 mg by mouth.    DICLOFENAC SODIUM (VOLTAREN) 1 % GEL    Apply 2 g topically 2 (two) times daily.    DICLOFENAC SODIUM (VOLTAREN) 1 % GEL    Apply 2 g topically 3 (three) times daily.    ENTRESTO  MG PER TABLET    Take 1 tablet by mouth 2 (two) times daily.    FLUTICASONE PROPIONATE (FLONASE) 50 MCG/ACTUATION NASAL SPRAY    1 spray (50 mcg total) by Each Nostril route 2 (two) times daily.    FUROSEMIDE (LASIX) 40 MG TABLET    Take 40 mg by mouth once daily.    GABAPENTIN (NEURONTIN) 300 MG CAPSULE    Take 4 capsules (1,200 mg total) by mouth 2 (two) times daily.    GLIMEPIRIDE (AMARYL) 4 MG TABLET    Take 4 mg by mouth daily with breakfast.     HYDROCODONE-ACETAMINOPHEN (NORCO) 5-325 MG PER TABLET    Take 1 tablet by mouth every 8 (eight) hours as needed for Pain.    HYDROXYZINE HCL (ATARAX) 25 MG TABLET    Take 1 tablet (25 mg total) by mouth 3 (three) times daily as needed for Itching or Anxiety.    INSULIN NPH-INSULIN REGULAR, 70/30, (NOVOLIN 70/30) 100 UNIT/ML (70-30) INJECTION    Inject into the skin. Inject 10 units at breakfast and inject 10 units at night    INSULIN SYRINGE-NEEDLE U-100 0.3 ML 31 GAUGE X 15/64" SYRG    USE TO INJECT INSULIN THREE TIMES DAILY    INSULIN SYRINGE-NEEDLE U-100 1/2 ML 31 X 5/16" SYRG        IPRATROPIUM (ATROVENT) 42 MCG (0.06 %) NASAL SPRAY    2 sprays by Nasal route every 6 (six) hours as needed for Rhinitis (use as needed for runny nose and also use 15 minutes prior to meal). Clean nose with saline prior to use    ISOSORBIDE MONONITRATE (IMDUR) 30 MG 24 HR TABLET    Take 30 mg by mouth once daily.    LANCETS MISC    To check BG 3 times daily, to use with insurance preferred meter    LIDOCAINE (LIDODERM) 5 %    Place 1 patch onto the skin once daily. Remove & Discard patch within 12 hours or as directed by MD    METFORMIN (GLUCOPHAGE) 500 MG TABLET    Take 1,000 mg by mouth 2 (two) times daily with meals. 2 Tablets in the morning, 2 Tablets in the evening "    METHOCARBAMOL (ROBAXIN) 500 MG TAB    TAKE 1 TABLET BY MOUTH 4 TIMES DAILY FOR 10 DAYS    METOCLOPRAMIDE HCL (REGLAN) 5 MG TABLET    Take 5 mg by mouth 3 (three) times daily as needed.    NITROGLYCERIN (NITROSTAT) 0.4 MG SL TABLET    DISSOLVE 1 TABLET UNDER THE TONGUE EVERY 5 MINUTES AS  NEEDED FOR CHEST PAIN. MAX  OF 3 TABLETS IN 15 MINUTES. CALL 911 IF PAIN PERSISTS.    PRAVASTATIN (PRAVACHOL) 20 MG TABLET    Take 1 tablet (20 mg total) by mouth once daily.    SPIRONOLACTONE (ALDACTONE) 25 MG TABLET    Take 25 mg by mouth.    SPIRONOLACTONE (ALDACTONE) 25 MG TABLET    Take 12.5 mg by mouth.    TIOTROPIUM-OLODATEROL (STIOLTO RESPIMAT) 2.5-2.5 MCG/ACTUATION MIST    Inhale 1 puff into the lungs once daily. Controller    TRIAMCINOLONE ACETONIDE 0.1% (KENALOG) 0.1 % CREAM    SMARTSI Application Topical 2-3 Times Daily    VIT C/VIT E AC/LUT/COPPER/ZINC (PRESERVISION LUTEIN ORAL)    Take by mouth.    WARFARIN (COUMADIN) 5 MG TABLET    Take 2 tabs by mouth on Tuesday,Thursday, Saturday and Sundays then 1.5 on all other days.   Discontinued Medications    DIAZEPAM (VALIUM) 2 MG TABLET    Take 1 tablet (2 mg total) by mouth every 12 (twelve) hours as needed for Anxiety (Dizziness).    DONEPEZIL (ARICEPT) 10 MG TABLET    Take 1 tablet (10 mg total) by mouth every evening. Take one half tablet for one week then as prescribed.    DULOXETINE (CYMBALTA) 60 MG CAPSULE    Take 1 capsule (60 mg total) by mouth once daily.       Allergies:   Review of patient's allergies indicates:   Allergen Reactions    Aricept odt [donepezil] Diarrhea and Nausea And Vomiting    Codeine Nausea Only     weak    Ranexa [ranolazine]          Vitals   Vitals:    23 0837   BP: 132/65   Pulse: 71        Labs/Imaging/Studies:   No results found for this or any previous visit (from the past 48 hour(s)).   No results found for: PHENYTOIN, PHENOBARB, VALPROATE, CBMZ      Nutritional Screening: Considering the patient's height and weight,  "medications, medical history and preferences, should a referral be made to the dietitian? no    Constitutional  Vitals:  Most recent vital signs, dated less than 90 days prior to this appointment, were reviewed.    Vitals:    03/17/23 0837   BP: 132/65   Pulse: 71   SpO2: (!) 91%   Weight: 84.5 kg (186 lb 4.6 oz)   Height: 5' 8" (1.727 m)        General:  unremarkable, age appropriate, casually dressed, neatly groomed     Musculoskeletal  Muscle Strength/Tone:  no spasicity, no rigidity, no cogwheeling, no flaccidity, no paratonia, no dyskinesia, no dystonia, no tremor, no tic, no choreoathetosis, no atrophy   Gait & Station:  uses walker       Psychiatric Mental Status Exam:  Arousal: alert  Sensorium/Orientation: oriented to grossly intact, person, place, situation, time/date  Behavior/Cooperation: normal, cooperative, eye contact normal   Speech: normal tone, normal rate, normal pitch, normal volume  Language: grossly intact, able to name, able to repeat  Mood: steady  Affect: congruent and appropriate  Thought Process: normal and logical  Thought Content: concerned about talking to someone   Auditory hallucinations: NO  Visual hallucinations: NO  Paranoia: NO  Delusions:  NO  Suicidal ideation: NO  Homicidal ideation: NO  Attention/Concentration:  spelled "WORLD" forwards and backwards  Memory:    Recent: [facility] Recent Memory: WNL , 2 out of 3 in 3 minutes  Remote: [facility] Remote Memory: WNL , past events, as relates history  Fund of Knowledge: Aware of current events, Intact, and Vocabulary appropriate    Intelligence: [facility] Intelligence: Average, based on history, based on vocabulary, syntax, grammar and content  Insight: {[facility] insight: Fair, understanding severity of illness/history of present illness  Judgment: [facility] Judgement: Fair, per patient's behavior/history of present illness      Relevant Elements of Neurological Exam: uses a walker        Laboratory Data  No visits with results within 1 Month(s) from this " "visit.   Latest known visit with results is:   Patient Outreach on 12/16/2022   Component Date Value Ref Range Status    eGFR 12/13/2022 56 (L)  > OR = 60 mL/min/1.73m2 Final    Hemoglobin A1C 12/13/2022 8.3 (H)  4.0 - 5.6 % Final    Cholesterol, Total 12/13/2022 87  <200 Final    Triglycerides 12/13/2022 63  <150 mg/dL Final    HDL 12/13/2022 29 (L)  > OR = 40 mg/dL Final    LDL Cholesterol 12/13/2022 44  <100 mg/dL Final    Non-HDL Cholesterol 12/13/2022 58  <130 mg/dL Final    CHOL/HDLC Ratio 12/13/2022 3.0  < OR = 5.0 Final         Medications  Outpatient Encounter Medications as of 3/17/2023   Medication Sig Dispense Refill    acetaminophen (TYLENOL) 325 MG tablet Take 2 tablets (650 mg total) by mouth every 6 (six) hours as needed. 13 tablet 0    albuterol (PROVENTIL/VENTOLIN HFA) 90 mcg/actuation inhaler INHALE 2 PUFFS BY MOUTH EVERY 6 HOURS AS NEEDED FOR WHEEZING OR  SHORTNESS  OF  BREATH 54 g 0    atorvastatin (LIPITOR) 40 MG tablet Take 40 mg by mouth once daily.      BD INSULIN PEN NEEDLE UF SHORT 31 gauge x 5/16" Ndle       BD INSULIN SYRINGE ULTRA-FINE 1/2 mL 31 gauge x 15/64" Syrg       blood sugar diagnostic Strp To check BG 3 times daily, to use with insurance preferred meter 200 strip 11    blood-glucose meter kit To check BG 3 times daily, to use with insurance preferred meter 1 each 0    carvedilol (COREG) 25 MG tablet Take 1 tablet (25 mg total) by mouth 2 (two) times daily. 180 tablet 0    ceramides 1,3,6-II (CERAVE DAILY MOISTURIZING) Lotn Apply twice daily to skin 355 mL 1    cinnamon bark 500 mg capsule Take 1,000 mg by mouth once daily.        clopidogreL (PLAVIX) 75 mg tablet Take 75 mg by mouth.      diclofenac sodium (VOLTAREN) 1 % Gel Apply 2 g topically 2 (two) times daily. 100 g 0    diclofenac sodium (VOLTAREN) 1 % Gel Apply 2 g topically 3 (three) times daily. 50 g 0    DULoxetine (CYMBALTA) 30 MG capsule Take 1 capsule (30 mg total) by mouth once daily. 30 capsule 0    ENTRESTO " " mg per tablet Take 1 tablet by mouth 2 (two) times daily.      fluticasone propionate (FLONASE) 50 mcg/actuation nasal spray 1 spray (50 mcg total) by Each Nostril route 2 (two) times daily. 18.2 mL 3    furosemide (LASIX) 40 MG tablet Take 40 mg by mouth once daily.      gabapentin (NEURONTIN) 300 MG capsule Take 4 capsules (1,200 mg total) by mouth 2 (two) times daily. (Patient taking differently: Take 900 mg by mouth 2 (two) times daily.) 540 capsule 1    glimepiride (AMARYL) 4 MG tablet Take 4 mg by mouth daily with breakfast.       HYDROcodone-acetaminophen (NORCO) 5-325 mg per tablet Take 1 tablet by mouth every 8 (eight) hours as needed for Pain. 45 tablet 0    hydrOXYzine HCL (ATARAX) 25 MG tablet Take 1 tablet (25 mg total) by mouth 3 (three) times daily as needed for Itching or Anxiety. 90 tablet 3    insulin NPH-insulin regular, 70/30, (NOVOLIN 70/30) 100 unit/mL (70-30) injection Inject into the skin. Inject 10 units at breakfast and inject 10 units at night      insulin syringe-needle U-100 0.3 mL 31 gauge x 15/64" Syrg USE TO INJECT INSULIN THREE TIMES DAILY      insulin syringe-needle U-100 1/2 mL 31 x 5/16" Syrg       ipratropium (ATROVENT) 42 mcg (0.06 %) nasal spray 2 sprays by Nasal route every 6 (six) hours as needed for Rhinitis (use as needed for runny nose and also use 15 minutes prior to meal). Clean nose with saline prior to use 15 mL 3    isosorbide mononitrate (IMDUR) 30 MG 24 hr tablet Take 30 mg by mouth once daily.      lancets Misc To check BG 3 times daily, to use with insurance preferred meter 200 each 11    LIDOcaine (LIDODERM) 5 % Place 1 patch onto the skin once daily. Remove & Discard patch within 12 hours or as directed by MD 5 patch 1    metformin (GLUCOPHAGE) 500 MG tablet Take 1,000 mg by mouth 2 (two) times daily with meals. 2 Tablets in the morning, 2 Tablets in the evening      methocarbamoL (ROBAXIN) 500 MG Tab TAKE 1 TABLET BY MOUTH 4 TIMES DAILY FOR 10 DAYS 60 " tablet 0    metoclopramide HCl (REGLAN) 5 MG tablet Take 5 mg by mouth 3 (three) times daily as needed.      nitroGLYCERIN (NITROSTAT) 0.4 MG SL tablet DISSOLVE 1 TABLET UNDER THE TONGUE EVERY 5 MINUTES AS  NEEDED FOR CHEST PAIN. MAX  OF 3 TABLETS IN 15 MINUTES. CALL 911 IF PAIN PERSISTS.      pravastatin (PRAVACHOL) 20 MG tablet Take 1 tablet (20 mg total) by mouth once daily. 90 tablet 1    spironolactone (ALDACTONE) 25 MG tablet Take 25 mg by mouth.      spironolactone (ALDACTONE) 25 MG tablet Take 12.5 mg by mouth.      tiotropium-olodateroL (STIOLTO RESPIMAT) 2.5-2.5 mcg/actuation Mist Inhale 1 puff into the lungs once daily. Controller 1 g 11    triamcinolone acetonide 0.1% (KENALOG) 0.1 % cream SMARTSI Application Topical 2-3 Times Daily      VIT C/VIT E AC/LUT/COPPER/ZINC (PRESERVISION LUTEIN ORAL) Take by mouth.      warfarin (COUMADIN) 5 MG tablet Take 2 tabs by mouth on Tuesday,Thursday, Saturday and Sundays then 1.5 on all other days.      [DISCONTINUED] diazePAM (VALIUM) 2 MG tablet Take 1 tablet (2 mg total) by mouth every 12 (twelve) hours as needed for Anxiety (Dizziness). 20 tablet 0    [DISCONTINUED] donepeziL (ARICEPT) 10 MG tablet Take 1 tablet (10 mg total) by mouth every evening. Take one half tablet for one week then as prescribed. 30 tablet 11    [DISCONTINUED] DULoxetine (CYMBALTA) 60 MG capsule Take 1 capsule (60 mg total) by mouth once daily. 90 capsule 3    [DISCONTINUED] DULoxetine (CYMBALTA) 60 MG capsule Take 1 capsule (60 mg total) by mouth once daily. 90 capsule 0     No facility-administered encounter medications on file as of 3/17/2023.           Assessment / Plan:     Diagnosis:      ICD-10-CM ICD-9-CM   1. Major depressive disorder, recurrent episode, mild  F33.0 296.31   2. MCI (mild cognitive impairment)  G31.84 331.83       Strengths and Liabilities: Strength: Patient accepts guidance/feedback, Strength: Patient has reasonable judgment., Strength: Patient is stable.,  Liability: Patient has poor health., Liability: Patient lacks coping skills.    Treatment Goals:  Specify outcomes written in observable, behavioral terms:   Anxiety: acquiring relapse prevention skills, reducing negative automatic thoughts, reducing physical symptoms of anxiety, and reducing time spent worrying (<30 minutes/day)  Depression: acquiring relapse prevention skills, increasing self-reward for positive behaviors (one/day), increasing self-reward for positive thoughts (one/day), increasing social contacts (three/week), reducing excessive guilt, and reducing fatigue    Treatment Plan/Recommendations:     Mood   Taper Cymbalta   Decrease Cymbalta 30mg QAM - continue for 1 month then stop.    At this time I do not believe patient needs to be on medications for mood. Patient states that he did not experience a change in his mood after taking Cymbalta 60mg. Due to patients decreased kidney function we will be discontinuing Cymbalta at this time. Patient was instructed to continue taking Cymbalta 60mg until Cymbalta 30mg arrives in the mail. Patient was given 30 pills of Cymbalta 30 mg. Patient was instructed to take Cymbalta 30 mg daily for one month and then stop. Patient scheduled appointment with me in three months to reevaluate need for medication.    Patient states that at this time he does not want to see a talk therapist, I discussed extensively with patient that I believe this would be beneficial in coping with the current stressors he is experiencing. Will reassess that next appointment.      Referral for talk therapy placed.  Pt was extensively educated in the importance of making and keeping a talk therapy appointment.     Discussed diagnosis, risks and benefits of proposed treatment above vs alternative treatments vs no treatment, and potential side effects of these treatments, and the inherent unpredictability of individual response to treatment.  The patient expresses understanding and gives  informed consent to pursue treatment.  The potential benefits outweigh the potential risks. Patient has no other questions. Risks/adverse effects discussed at this time including but not limited to: GI side effects, sexual dysfunction, activation vs sedation, triggering of suicidal thoughts, and serotonin syndrome.    Serotonin syndrome   Mental status changes can include anxiety, restlessness, disorientation, and agitated delirium.    Autonomic manifestations can include diaphoresis, tachycardia, hyperthermia, hypertension, vomiting, and diarrhea   Neuromuscular hyperactivity can manifest as tremor, myoclonus, hyperreflexia, rigidity, hyperthermia, seizure, and bilateral Babinski sign.   Pt was informed that if they experience any of these symptoms to go the ED.       Difficulty Sleeping Behavioral Modification:  Implement stimulus control: [place] bedroom for sleep only. Leave bedroom when frustrated from not sleeping. Engage in relaxation before returning. Engage in activities during the day. AVOID >7-8 h time in bed  Avoid clock watching  Avoid thinking/worrying about sleep when trying to fall asleep  Limit caffeinated consumption  Make sure the bedroom is dark, quiet and cool    Safety Plan   Patient voices understanding and agreement with this plan  Provided crisis numbers  Encouraged patient to keep future appointments.  Instructed patient to call or message with questions or concerns  In the event of an emergency, including suicidal ideation, patient was advised to go to the emergency room and/or call 911    Return to Clinic: 3 months    Psychotherapy:  Target symptoms: depression, anxiety   Why chosen therapy is appropriate versus another modality: relevant to diagnosis, evidence based practice  Outcome monitoring methods: self-report, observation  Therapeutic intervention type: insight oriented psychotherapy, supportive psychotherapy  Topics discussed/themes: relationships difficulties, illness/death of a  loved one, building skills sets for symptom management, symptom recognition, financial stressors  The patient's response to the intervention is guarded. The patient's progress toward treatment goals is fair.   Duration of intervention: 18 minutes.    Total face to face time: 60 min  Total time (chart review, patient contact, documentation): 78 min  Pt has difficulty hearing, seeing, and a poor attention span.  Pt required constant repetition throughout interview.      A diagnostic psychiatric evaluation was performed and responsiveness to treatment was assessed.  The patient demonstrates adequate ability/capacity to respond to treatment.    Lauro Parada PA-C      *This note has been prepared using a combination of a dictation device and typing.  It has been checked for errors but some errors may still exist within the note as a result of speech recognition errors and/or typographical errors.

## 2023-03-22 ENCOUNTER — TELEPHONE (OUTPATIENT)
Dept: FAMILY MEDICINE | Facility: CLINIC | Age: 83
End: 2023-03-22
Payer: MEDICARE

## 2023-03-22 NOTE — TELEPHONE ENCOUNTER
----- Message from Graham Alba sent at 3/22/2023  9:33 AM CDT -----  Regarding: Self [phone]  Type:  Needs Medical Advice/Symptom-based Call    Who Called:  Self     Symptoms (please be specific):  Fever blister in nose    How long has patient had these symptoms:  week and a half     Pharmacy:   [facility] [place] - [address] [id number] [facility]  [place] [address]  Phone: [phone] Fax: [phone]    Would the patient rather a call back or a response via [facility]?  Call back     Best Call Back Number:  [phone]     Additional Information:

## 2023-03-24 ENCOUNTER — TELEPHONE (OUTPATIENT)
Dept: FAMILY MEDICINE | Facility: CLINIC | Age: 83
End: 2023-03-24
Payer: MEDICARE

## 2023-03-24 ENCOUNTER — OFFICE VISIT (OUTPATIENT)
Dept: PAIN MEDICINE | Facility: CLINIC | Age: 83
End: 2023-03-24
Payer: MEDICARE

## 2023-03-24 VITALS
SYSTOLIC BLOOD PRESSURE: 119 MMHG | WEIGHT: 190.63 LBS | HEART RATE: 66 BPM | BODY MASS INDEX: 28.89 KG/M2 | HEIGHT: 68 IN | OXYGEN SATURATION: 96 % | DIASTOLIC BLOOD PRESSURE: 56 MMHG | RESPIRATION RATE: 12 BRPM

## 2023-03-24 DIAGNOSIS — M48.062 SPINAL STENOSIS OF LUMBAR REGION WITH NEUROGENIC CLAUDICATION: ICD-10-CM

## 2023-03-24 DIAGNOSIS — M54.16 LUMBAR RADICULOPATHY: ICD-10-CM

## 2023-03-24 DIAGNOSIS — M51.36 DDD (DEGENERATIVE DISC DISEASE), LUMBAR: ICD-10-CM

## 2023-03-24 DIAGNOSIS — M47.816 LUMBAR SPONDYLOSIS: ICD-10-CM

## 2023-03-24 DIAGNOSIS — M54.41 CHRONIC RIGHT-SIDED LOW BACK PAIN WITH RIGHT-SIDED SCIATICA: Primary | ICD-10-CM

## 2023-03-24 DIAGNOSIS — G89.29 CHRONIC RIGHT-SIDED LOW BACK PAIN WITH RIGHT-SIDED SCIATICA: Primary | ICD-10-CM

## 2023-03-24 PROCEDURE — 99999 PR PBB SHADOW E&M-EST. PATIENT-LVL V: ICD-10-PCS | Mod: PBBFAC,,,

## 2023-03-24 PROCEDURE — 1125F AMNT PAIN NOTED PAIN PRSNT: CPT | Mod: CPTII,S$GLB,,

## 2023-03-24 PROCEDURE — 1101F PR PT FALLS ASSESS DOC 0-1 FALLS W/OUT INJ PAST YR: ICD-10-PCS | Mod: CPTII,S$GLB,,

## 2023-03-24 PROCEDURE — 99999 PR PBB SHADOW E&M-EST. PATIENT-LVL V: CPT | Mod: PBBFAC,,,

## 2023-03-24 PROCEDURE — 1157F ADVNC CARE PLAN IN RCRD: CPT | Mod: CPTII,S$GLB,,

## 2023-03-24 PROCEDURE — 1157F PR ADVANCE CARE PLAN OR EQUIV PRESENT IN MEDICAL RECORD: ICD-10-PCS | Mod: CPTII,S$GLB,,

## 2023-03-24 PROCEDURE — 3078F PR MOST RECENT DIASTOLIC BLOOD PRESSURE < 80 MM HG: ICD-10-PCS | Mod: CPTII,S$GLB,,

## 2023-03-24 PROCEDURE — 1125F PR PAIN SEVERITY QUANTIFIED, PAIN PRESENT: ICD-10-PCS | Mod: CPTII,S$GLB,,

## 2023-03-24 PROCEDURE — 1101F PT FALLS ASSESS-DOCD LE1/YR: CPT | Mod: CPTII,S$GLB,,

## 2023-03-24 PROCEDURE — 3074F PR MOST RECENT SYSTOLIC BLOOD PRESSURE < 130 MM HG: ICD-10-PCS | Mod: CPTII,S$GLB,,

## 2023-03-24 PROCEDURE — 99214 OFFICE O/P EST MOD 30 MIN: CPT | Mod: S$GLB,,,

## 2023-03-24 PROCEDURE — 3288F FALL RISK ASSESSMENT DOCD: CPT | Mod: CPTII,S$GLB,,

## 2023-03-24 PROCEDURE — 3288F PR FALLS RISK ASSESSMENT DOCUMENTED: ICD-10-PCS | Mod: CPTII,S$GLB,,

## 2023-03-24 PROCEDURE — 1160F PR REVIEW ALL MEDS BY PRESCRIBER/CLIN PHARMACIST DOCUMENTED: ICD-10-PCS | Mod: CPTII,S$GLB,,

## 2023-03-24 PROCEDURE — 1159F PR MEDICATION LIST DOCUMENTED IN MEDICAL RECORD: ICD-10-PCS | Mod: CPTII,S$GLB,,

## 2023-03-24 PROCEDURE — 99214 PR OFFICE/OUTPT VISIT, EST, LEVL IV, 30-39 MIN: ICD-10-PCS | Mod: S$GLB,,,

## 2023-03-24 PROCEDURE — 3074F SYST BP LT 130 MM HG: CPT | Mod: CPTII,S$GLB,,

## 2023-03-24 PROCEDURE — 1159F MED LIST DOCD IN RCRD: CPT | Mod: CPTII,S$GLB,,

## 2023-03-24 PROCEDURE — 1160F RVW MEDS BY RX/DR IN RCRD: CPT | Mod: CPTII,S$GLB,,

## 2023-03-24 PROCEDURE — 3078F DIAST BP <80 MM HG: CPT | Mod: CPTII,S$GLB,,

## 2023-03-24 RX ORDER — VALSARTAN 80 MG/1
80 TABLET ORAL
COMMUNITY
Start: 2023-03-13

## 2023-03-24 RX ORDER — HYDROCODONE BITARTRATE AND ACETAMINOPHEN 5; 325 MG/1; MG/1
1 TABLET ORAL EVERY 8 HOURS PRN
Qty: 60 TABLET | Refills: 0 | Status: SHIPPED | OUTPATIENT
Start: 2023-03-24 | End: 2023-04-23

## 2023-03-24 RX ORDER — HYDROCODONE BITARTRATE AND ACETAMINOPHEN 5; 325 MG/1; MG/1
1 TABLET ORAL EVERY 8 HOURS PRN
Qty: 60 TABLET | Refills: 0 | Status: SHIPPED | OUTPATIENT
Start: 2023-05-23 | End: 2023-06-15 | Stop reason: SDUPTHER

## 2023-03-24 RX ORDER — HYDROCODONE BITARTRATE AND ACETAMINOPHEN 5; 325 MG/1; MG/1
1 TABLET ORAL EVERY 8 HOURS PRN
Qty: 60 TABLET | Refills: 0 | Status: SHIPPED | OUTPATIENT
Start: 2023-04-23 | End: 2023-05-23

## 2023-03-24 NOTE — TELEPHONE ENCOUNTER
Notified patient that his appointment on 04/05/23 due to Dr. Dee not seeing new patients. Patient is upset that the next appointment is not until 07/2023. He would like to be seen sooner. Please call the patient. [phone]. Thank you.

## 2023-03-24 NOTE — PROGRESS NOTES
Subjective:     Patient ID: Percy Ramirez is a 82 y.o. male    Chief Complaint: Follow-up (Bilateral knee pain, left hip pain)      Referred by: No ref. provider found      HPI:    Interval History PA (03/24/2023):  Patient returns to clinic for follow up of chronic lower back and extremity pain and medication refill.  Patient denies any changes in the quality or location of his pain since previous visit.  Denies any new or worsening symptoms.  Patient was recently started on [place] 5-325 Q 8 hours p.r.n..  He reports typically spitting the pills in half and occasionally taking a full pill with increased pain.  Notes taking approximately 2 pill total per day.  Notes adequate relief from this medication, denies any adverse effects.    Initial Encounter (2/24/23):  Percy Ramirez is a 82 y.o. male who presents today with chronic low back and lower extremity pain.  This pain has been present for years.  No specific inciting events or injuries noted.  Pain has been worsening over time.  Pain is constant but significantly worsened with standing or walking.  Can only stand or walk for short periods of time before needing to rest.  Pain is located in the midline lower lumbar spine and will radiate into the bilateral lumbar paraspinal regions and hips.  Denies any persistent numbness, tingling, weakness, bowel bladder dysfunction.  Does report that his legs feel weak after standing or walking for prolonged periods of time.  Pain improves upon sitting but still has pain when sitting.   This pain is described in detail below.    Physical Therapy:  No.    Non-pharmacologic Treatment:  Rest helps         TENS?  No    Pain Medications:         Currently taking:  Tylenol, gabapentin, Robaxin, Cymbalta, Norco 5-325 mg Q 8 hours prn    Has tried in the past:      Has not tried:  NSAIDs, TCAs, topical creams    Blood thinners:  Coumadin    Interventional Therapies:  None    Relevant Surgeries:  None    Affecting sleep?   Yes    Affecting daily activities? yes    Depressive symptoms? no          SI/HI? No    Work status: Retired    Pain Scores:    Best:       0/10  Worst:     10/10  Usually:   9/10  Today:    10/10         Review of Systems   Constitutional:  Negative for activity change, appetite change, chills, fatigue, fever and unexpected weight change.   HENT:  Negative for hearing loss.    Eyes:  Negative for visual disturbance.   Respiratory:  Negative for chest tightness and shortness of breath.    Cardiovascular:  Negative for chest pain.   Gastrointestinal:  Negative for abdominal pain, constipation, diarrhea, nausea and vomiting.   Genitourinary:  Negative for difficulty urinating.   Musculoskeletal:  Positive for arthralgias, back pain, gait problem and myalgias. Negative for neck pain.   Skin:  Negative for rash.   Neurological:  Negative for dizziness, weakness, light-headedness, numbness and headaches.   Psychiatric/Behavioral:  Positive for sleep disturbance. Negative for hallucinations and suicidal ideas. The patient is not nervous/anxious.      Past Medical History:   Diagnosis Date    Allergy     Arthritis     CAD, multiple vessel     DR Melissa    Cataract     Depression     History of colon polyps     Hyperlipidemia     Hypertension     Macular degeneration     Dr Razo for injection, Jennifer for eye    S/P CABG x 2     Type 2 diabetes mellitus with circulatory disorder     Dr. Aquino       Past Surgical History:   Procedure Laterality Date    broken elbow      left    COLONOSCOPY N/A 10/4/2017    Procedure: COLONOSCOPY;  Surgeon: Gabriel Leung MD;  Location: [facility];  Service: Endoscopy;  Laterality: N/A;    EYE SURGERY      cataract    heart bypass      2004    HERNIA REPAIR      right    ROTATOR CUFF REPAIR      right  2004       Social History     Socioeconomic History    Marital status:     Number of children: 4    Years of education: GED   Occupational History    Occupation: retired [employer]  "   Tobacco Use    Smoking status: Former     Packs/day: 3.00     Years: 45.00     Pack years: 135.00     Types: Cigarettes     Quit date: 2004     Years since quittin.9    Smokeless tobacco: Former   Substance and Sexual Activity    Alcohol use: Yes     Comment: was heavy drinker 35 years ago, rare use now    Drug use: No    Sexual activity: Yes     Partners: Female       Review of patient's allergies indicates:   Allergen Reactions    Aricept odt [donepezil] Diarrhea and Nausea And Vomiting    Codeine Nausea Only     weak    Ranexa [ranolazine]        Current Outpatient Medications on File Prior to Visit   Medication Sig Dispense Refill    acetaminophen (TYLENOL) 325 MG tablet Take 2 tablets (650 mg total) by mouth every 6 (six) hours as needed. 13 tablet 0    albuterol (PROVENTIL/VENTOLIN HFA) 90 mcg/actuation inhaler INHALE 2 PUFFS BY MOUTH EVERY 6 HOURS AS NEEDED FOR WHEEZING OR  SHORTNESS  OF  BREATH 54 g 0    atorvastatin (LIPITOR) 40 MG tablet Take 40 mg by mouth once daily.      BD INSULIN PEN NEEDLE UF SHORT 31 gauge x 5/16" Ndle       BD INSULIN SYRINGE ULTRA-FINE 1/2 mL 31 gauge x 15/64" Syrg       blood sugar diagnostic Strp To check BG 3 times daily, to use with insurance preferred meter 200 strip 11    carvedilol (COREG) 25 MG tablet Take 1 tablet (25 mg total) by mouth 2 (two) times daily. 180 tablet 0    ceramides 1,3,6-II (CERAVE DAILY MOISTURIZING) Lotn Apply twice daily to skin 355 mL 1    cinnamon bark 500 mg capsule Take 1,000 mg by mouth once daily.        clopidogreL (PLAVIX) 75 mg tablet Take 75 mg by mouth.      diclofenac sodium (VOLTAREN) 1 % Gel Apply 2 g topically 2 (two) times daily. 100 g 0    diclofenac sodium (VOLTAREN) 1 % Gel Apply 2 g topically 3 (three) times daily. 50 g 0    DULoxetine (CYMBALTA) 30 MG capsule Take 1 capsule (30 mg total) by mouth once daily. 30 capsule 0    ENTRESTO  mg per tablet Take 1 tablet by mouth 2 (two) times daily.      fluticasone " "propionate (FLONASE) 50 mcg/actuation nasal spray 1 spray (50 mcg total) by Each Nostril route 2 (two) times daily. 18.2 mL 3    furosemide (LASIX) 40 MG tablet Take 40 mg by mouth once daily.      gabapentin (NEURONTIN) 300 MG capsule Take 4 capsules (1,200 mg total) by mouth 2 (two) times daily. (Patient taking differently: Take 900 mg by mouth 2 (two) times daily.) 540 capsule 1    glimepiride (AMARYL) 4 MG tablet Take 4 mg by mouth daily with breakfast.       hydrOXYzine HCL (ATARAX) 25 MG tablet Take 1 tablet (25 mg total) by mouth 3 (three) times daily as needed for Itching or Anxiety. 90 tablet 3    insulin NPH-insulin regular, 70/30, (NOVOLIN 70/30) 100 unit/mL (70-30) injection Inject into the skin. Inject 10 units at breakfast and inject 10 units at night      insulin syringe-needle U-100 0.3 mL 31 gauge x 15/64" Syrg USE TO INJECT INSULIN THREE TIMES DAILY      insulin syringe-needle U-100 1/2 mL 31 x 5/16" Syrg       ipratropium (ATROVENT) 42 mcg (0.06 %) nasal spray 2 sprays by Nasal route every 6 (six) hours as needed for Rhinitis (use as needed for runny nose and also use 15 minutes prior to meal). Clean nose with saline prior to use 15 mL 3    isosorbide mononitrate (IMDUR) 30 MG 24 hr tablet Take 30 mg by mouth once daily.      lancets Misc To check BG 3 times daily, to use with insurance preferred meter 200 each 11    LIDOcaine (LIDODERM) 5 % Place 1 patch onto the skin once daily. Remove & Discard patch within 12 hours or as directed by MD 5 patch 1    metformin (GLUCOPHAGE) 500 MG tablet Take 1,000 mg by mouth 2 (two) times daily with meals. 2 Tablets in the morning, 2 Tablets in the evening      methocarbamoL (ROBAXIN) 500 MG Tab TAKE 1 TABLET BY MOUTH 4 TIMES DAILY FOR 10 DAYS 60 tablet 0    metoclopramide HCl (REGLAN) 5 MG tablet Take 5 mg by mouth 3 (three) times daily as needed.      nitroGLYCERIN (NITROSTAT) 0.4 MG SL tablet DISSOLVE 1 TABLET UNDER THE TONGUE EVERY 5 MINUTES AS  NEEDED FOR " "CHEST PAIN. MAX  OF 3 TABLETS IN 15 MINUTES. CALL 911 IF PAIN PERSISTS.      pravastatin (PRAVACHOL) 20 MG tablet Take 1 tablet (20 mg total) by mouth once daily. 90 tablet 1    spironolactone (ALDACTONE) 25 MG tablet Take 25 mg by mouth.      spironolactone (ALDACTONE) 25 MG tablet Take 12.5 mg by mouth.      tiotropium-olodateroL (STIOLTO RESPIMAT) 2.5-2.5 mcg/actuation Mist Inhale 1 puff into the lungs once daily. Controller 1 g 11    triamcinolone acetonide 0.1% (KENALOG) 0.1 % cream SMARTSI Application Topical 2-3 Times Daily      valsartan (DIOVAN) 80 MG tablet Take 80 mg by mouth.      VIT C/VIT E AC/LUT/COPPER/ZINC (PRESERVISION LUTEIN ORAL) Take by mouth.      warfarin (COUMADIN) 5 MG tablet Take 2 tabs by mouth on Tuesday,Thursday, Saturday and Sundays then 1.5 on all other days.      blood-glucose meter kit To check BG 3 times daily, to use with insurance preferred meter 1 each 0     No current facility-administered medications on file prior to visit.       Objective:      Ht 5' 8" (1.727 m)   Wt 86.5 kg (190 lb 9.6 oz)   BMI 28.98 kg/m²     Exam:  GEN:  Well developed, well nourished.  No acute distress.   HEENT:  No trauma.  Mucous membranes moist.  Nares patent bilaterally.  PSYCH: Normal affect. Thought content appropriate.  CHEST:  Breathing symmetric.  No audible wheezing.  ABD: Soft, non-distended.  SKIN:  Warm, pink, dry.  No rash on exposed areas.    EXT:  No cyanosis, clubbing, or edema.  No color change or changes in nail or hair growth.  NEURO/MUSCULOSKELETAL:  Fully alert, oriented, and appropriate. Speech normal keesha. No cranial nerve deficits.   Gait:  Antalgic.  Uses rolling walker for ambulation.  No focal motor deficits.       Imaging:      Narrative & Impression    EXAMINATION:  CT LUMBAR SPINE WITHOUT CONTRAST     CLINICAL HISTORY:  Lumbar radiculopathy, no red flags, no prior management;  Radiculopathy, lumbar region     TECHNIQUE:  Low-dose axial, sagittal and coronal " reformations are obtained through the lumbar spine.  Contrast was not administered.     COMPARISON:  02/25/2015     FINDINGS:  Alignment: Grade 1 anterolisthesis at L4-L5.     Vertebrae: No fracture. No lytic or blastic lesion.     Discs: Mild disc height loss at L3-L5.     Sacroiliac joints: Mild degenerative changes.     Degenerative findings:     T12-L1: No spinal canal stenosis or neural foraminal narrowing.     L1-L2: No spinal canal stenosis or neural foraminal narrowing.     L2-L3: Circumferential disc bulge and mild facet arthropathy resulting in mild bilateral neural foraminal narrowing.     L3-L4: Circumferential disc bulge and moderate facet arthropathy resulting in moderate to severe spinal canal stenosis and moderate bilateral neural foraminal narrowing.     L4-L5: Circumferential disc bulge and severe facet arthropathy resulting in severe spinal canal stenosis and moderate bilateral neural foraminal narrowing.     L5-S1: Circumferential disc bulge and severe facet arthropathy resulting in mild bilateral neural foraminal narrowing.     Paraspinal muscles & soft tissues: Mild paraspinal muscle atrophy.  Vascular calcifications with partially visualized ectasia of the abdominal aorta and iliac arteries.  Fibrotic changes at the lung bases.     Impression:     1. Multilevel degenerative changes of the lumbar spine detailed above.  Moderate to severe spinal canal stenosis at L4-S1.  Moderate neural foraminal narrowing at L3-L5.  2. 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        Electronically signed by: Yogesh Gamez MD  Date:                                            10/19/2022  Time:                                           09:56       Assessment:       Encounter Diagnoses   Name Primary?    Chronic right-sided low back pain with right-sided sciatica Yes    DDD (degenerative disc disease), lumbar     Lumbar radiculopathy     Lumbar spondylosis     Spinal stenosis of  lumbar region with neurogenic claudication            Plan:       Percy was seen today for follow-up.    Diagnoses and all orders for this visit:    Chronic right-sided low back pain with right-sided sciatica  -     Pain Clinic Drug Screen    DDD (degenerative disc disease), lumbar    Lumbar radiculopathy    Lumbar spondylosis    Spinal stenosis of lumbar region with neurogenic claudication          Percy Ramirez is a 82 y.o. male with chronic low back and lower extremity pain.  Subjectively symptoms most consistent with neurogenic claudication related to spinal stenosis.  Patient is noted to have multilevel moderate to severe spinal stenosis on lumbar CT scan.    Prior records reviewed.   Continue Norco 5-325 mg q.8 hours p.r.n..  Three, one month supplies of 60 pills per month provided today.  Patient reportedly taking half a pill 3 times a day, occasionally will take a full pill with episodes increased pain.  Reports typically taking approximately 2 total pills per day.  Prescription monitoring program reviewed today.  No inconsistencies noted.   Unable to complete UDS today. Will plan on completing at f/u.   Opioid risk tool completed.  Low risk.  Pain contract signed on 03/24/2023  Dr. Santos is the provider of medication management and agrees with the plan of care.   Return to clinic in 3 months or sooner if needed.  At that time we will discuss efficacy of medications and make and necessary adjustments.       Fernando Downs PA-C  [facility]  Interventional Pain Management   03/24/2023    This note was created by combination of typed  and M-Modal dictation.  Transcription and phonetic errors may be present.  If there are any questions, please contact me.

## 2023-03-28 ENCOUNTER — TELEPHONE (OUTPATIENT)
Dept: PAIN MEDICINE | Facility: CLINIC | Age: 83
End: 2023-03-28
Payer: MEDICARE

## 2023-03-28 NOTE — TELEPHONE ENCOUNTER
Advised the pt that he can take 1 whole pill every  8hrs as long as he does not exceed that amount in 8 hours.          ----- Message from Brigitte Ramirez sent at 3/28/2023 10:49 AM CDT -----  Regarding: Patient call back  .Type: Patient Call Back    Who called:    What is the request in detail:would like to know if he can take his medication (HYDROcodone-acetaminophen) more frequently. He is currently taking half of tablet every 8 hours. Would like to know if he can take the other half of the pill in between(at 4 hours)     Can the clinic reply by [facility]?no     Would the patient rather a call back or a response via [facility]? call    Best call back number: .[phone]      Additional Information:

## 2023-03-29 RX ORDER — DIAZEPAM 2 MG/1
2 TABLET ORAL EVERY 12 HOURS PRN
Status: CANCELLED | OUTPATIENT
Start: 2023-03-29 | End: 2023-04-28

## 2023-03-29 NOTE — TELEPHONE ENCOUNTER
----- Message from Josselyn Mae sent at 3/29/2023 10:59 AM CDT -----  Regarding: Requesting Refill  .Type: RX Refill Request    Who Called:  self     Have you contacted your pharmacy:    Refill or New Rx: Refill      RX Name and Strength: Diazepam     Preferred Pharmacy with phone number: .  [id number] - [place] - [address]  [address]  [place] [address]  Phone: [phone] Fax: [phone]    Local or Mail Order: local     Ordering Provider: Julien     Would the patient rather a call back or a response via [facility]? Call     Best Call Back Number: .[phone]      Additional Information:

## 2023-03-30 ENCOUNTER — TELEPHONE (OUTPATIENT)
Dept: PAIN MEDICINE | Facility: CLINIC | Age: 83
End: 2023-03-30
Payer: MEDICARE

## 2023-04-03 ENCOUNTER — TELEPHONE (OUTPATIENT)
Dept: FAMILY MEDICINE | Facility: CLINIC | Age: 83
End: 2023-04-03
Payer: MEDICARE

## 2023-04-03 NOTE — TELEPHONE ENCOUNTER
----- Message from Luis Alfredo Craig sent at 4/3/2023  8:48 AM CDT -----  Contact: Patient  Type:  Patient Call          Who Called: patient         Does the patient know what this is regarding?: Requesting a call back pt have a question about the covid vaccine ; please advise           Would the patient rather a call back or a response via [facility]? Call           Best Call Back Number: [phone]             Additional Information:

## 2023-04-04 ENCOUNTER — TELEPHONE (OUTPATIENT)
Dept: FAMILY MEDICINE | Facility: CLINIC | Age: 83
End: 2023-04-04
Payer: MEDICARE

## 2023-04-04 DIAGNOSIS — E11.65 UNCONTROLLED TYPE 2 DIABETES MELLITUS WITH HYPERGLYCEMIA: Primary | ICD-10-CM

## 2023-04-04 PROBLEM — Z79.899 DRUG-INDUCED IMMUNODEFICIENCY: Status: RESOLVED | Noted: 2022-08-10 | Resolved: 2023-04-04

## 2023-04-04 PROBLEM — D84.821 DRUG-INDUCED IMMUNODEFICIENCY: Status: RESOLVED | Noted: 2022-08-10 | Resolved: 2023-04-04

## 2023-04-05 ENCOUNTER — LAB VISIT (OUTPATIENT)
Dept: LAB | Facility: HOSPITAL | Age: 83
End: 2023-04-05
Attending: INTERNAL MEDICINE
Payer: MEDICARE

## 2023-04-05 DIAGNOSIS — E11.65 UNCONTROLLED TYPE 2 DIABETES MELLITUS WITH HYPERGLYCEMIA: ICD-10-CM

## 2023-04-05 LAB
ESTIMATED AVG GLUCOSE: 183 MG/DL (ref 68–131)
HBA1C MFR BLD: 8 % (ref 4–5.6)

## 2023-04-05 PROCEDURE — 36415 COLL VENOUS BLD VENIPUNCTURE: CPT | Mod: PO | Performed by: INTERNAL MEDICINE

## 2023-04-05 PROCEDURE — 83036 HEMOGLOBIN GLYCOSYLATED A1C: CPT | Performed by: INTERNAL MEDICINE

## 2023-04-11 ENCOUNTER — OFFICE VISIT (OUTPATIENT)
Dept: PODIATRY | Facility: CLINIC | Age: 83
End: 2023-04-11
Payer: MEDICARE

## 2023-04-11 VITALS — WEIGHT: 190 LBS | HEIGHT: 68 IN | BODY MASS INDEX: 28.79 KG/M2

## 2023-04-11 DIAGNOSIS — B35.1 ONYCHOMYCOSIS DUE TO DERMATOPHYTE: ICD-10-CM

## 2023-04-11 DIAGNOSIS — L84 CORN OR CALLUS: ICD-10-CM

## 2023-04-11 DIAGNOSIS — E11.49 TYPE II DIABETES MELLITUS WITH NEUROLOGICAL MANIFESTATIONS: Primary | ICD-10-CM

## 2023-04-11 PROCEDURE — 11056 PARNG/CUTG B9 HYPRKR LES 2-4: CPT | Mod: Q9,S$GLB,, | Performed by: PODIATRIST

## 2023-04-11 PROCEDURE — 1160F PR REVIEW ALL MEDS BY PRESCRIBER/CLIN PHARMACIST DOCUMENTED: ICD-10-PCS | Mod: CPTII,S$GLB,, | Performed by: PODIATRIST

## 2023-04-11 PROCEDURE — 1125F AMNT PAIN NOTED PAIN PRSNT: CPT | Mod: CPTII,S$GLB,, | Performed by: PODIATRIST

## 2023-04-11 PROCEDURE — 1157F PR ADVANCE CARE PLAN OR EQUIV PRESENT IN MEDICAL RECORD: ICD-10-PCS | Mod: CPTII,S$GLB,, | Performed by: PODIATRIST

## 2023-04-11 PROCEDURE — 99499 UNLISTED E&M SERVICE: CPT | Mod: S$GLB,,, | Performed by: PODIATRIST

## 2023-04-11 PROCEDURE — 11056 PR TRIM BENIGN HYPERKERATOTIC SKIN LESION,2-4: ICD-10-PCS | Mod: Q9,S$GLB,, | Performed by: PODIATRIST

## 2023-04-11 PROCEDURE — 99999 PR PBB SHADOW E&M-EST. PATIENT-LVL IV: CPT | Mod: PBBFAC,,, | Performed by: PODIATRIST

## 2023-04-11 PROCEDURE — 1159F MED LIST DOCD IN RCRD: CPT | Mod: CPTII,S$GLB,, | Performed by: PODIATRIST

## 2023-04-11 PROCEDURE — 1157F ADVNC CARE PLAN IN RCRD: CPT | Mod: CPTII,S$GLB,, | Performed by: PODIATRIST

## 2023-04-11 PROCEDURE — 1101F PT FALLS ASSESS-DOCD LE1/YR: CPT | Mod: CPTII,S$GLB,, | Performed by: PODIATRIST

## 2023-04-11 PROCEDURE — 11721 DEBRIDE NAIL 6 OR MORE: CPT | Mod: 59,Q9,S$GLB, | Performed by: PODIATRIST

## 2023-04-11 PROCEDURE — 3288F FALL RISK ASSESSMENT DOCD: CPT | Mod: CPTII,S$GLB,, | Performed by: PODIATRIST

## 2023-04-11 PROCEDURE — 99999 PR PBB SHADOW E&M-EST. PATIENT-LVL IV: ICD-10-PCS | Mod: PBBFAC,,, | Performed by: PODIATRIST

## 2023-04-11 PROCEDURE — 11721 PR DEBRIDEMENT OF NAILS, 6 OR MORE: ICD-10-PCS | Mod: 59,Q9,S$GLB, | Performed by: PODIATRIST

## 2023-04-11 PROCEDURE — 99499 NO LOS: ICD-10-PCS | Mod: S$GLB,,, | Performed by: PODIATRIST

## 2023-04-11 PROCEDURE — 3288F PR FALLS RISK ASSESSMENT DOCUMENTED: ICD-10-PCS | Mod: CPTII,S$GLB,, | Performed by: PODIATRIST

## 2023-04-11 PROCEDURE — 1160F RVW MEDS BY RX/DR IN RCRD: CPT | Mod: CPTII,S$GLB,, | Performed by: PODIATRIST

## 2023-04-11 PROCEDURE — 1101F PR PT FALLS ASSESS DOC 0-1 FALLS W/OUT INJ PAST YR: ICD-10-PCS | Mod: CPTII,S$GLB,, | Performed by: PODIATRIST

## 2023-04-11 PROCEDURE — 1125F PR PAIN SEVERITY QUANTIFIED, PAIN PRESENT: ICD-10-PCS | Mod: CPTII,S$GLB,, | Performed by: PODIATRIST

## 2023-04-11 PROCEDURE — 1159F PR MEDICATION LIST DOCUMENTED IN MEDICAL RECORD: ICD-10-PCS | Mod: CPTII,S$GLB,, | Performed by: PODIATRIST

## 2023-04-12 ENCOUNTER — OFFICE VISIT (OUTPATIENT)
Dept: FAMILY MEDICINE | Facility: CLINIC | Age: 83
End: 2023-04-12
Payer: MEDICARE

## 2023-04-12 VITALS
DIASTOLIC BLOOD PRESSURE: 70 MMHG | BODY MASS INDEX: 29.29 KG/M2 | WEIGHT: 193.25 LBS | HEIGHT: 68 IN | SYSTOLIC BLOOD PRESSURE: 140 MMHG | OXYGEN SATURATION: 93 % | TEMPERATURE: 98 F | HEART RATE: 73 BPM

## 2023-04-12 DIAGNOSIS — R42 CHRONIC VERTIGO: ICD-10-CM

## 2023-04-12 DIAGNOSIS — Z00.00 ROUTINE MEDICAL EXAM: Primary | ICD-10-CM

## 2023-04-12 DIAGNOSIS — I50.42 CHRONIC COMBINED SYSTOLIC AND DIASTOLIC HEART FAILURE: ICD-10-CM

## 2023-04-12 DIAGNOSIS — E66.3 OVERWEIGHT (BMI 25.0-29.9): ICD-10-CM

## 2023-04-12 DIAGNOSIS — I20.89 CHRONIC STABLE ANGINA: ICD-10-CM

## 2023-04-12 DIAGNOSIS — J84.9 ILD (INTERSTITIAL LUNG DISEASE): ICD-10-CM

## 2023-04-12 DIAGNOSIS — N18.30 BENIGN HYPERTENSION WITH CHRONIC KIDNEY DISEASE, STAGE III: ICD-10-CM

## 2023-04-12 DIAGNOSIS — I77.9 BILATERAL CAROTID ARTERY DISEASE, UNSPECIFIED TYPE: ICD-10-CM

## 2023-04-12 DIAGNOSIS — I25.118 CORONARY ARTERY DISEASE OF NATIVE ARTERY OF NATIVE HEART WITH STABLE ANGINA PECTORIS: ICD-10-CM

## 2023-04-12 DIAGNOSIS — Z99.89 AMBULATES WITH CANE: ICD-10-CM

## 2023-04-12 DIAGNOSIS — E11.51 TYPE 2 DIABETES, WITH PERIPHERAL CIRCULATORY DISORDER NOT AT GOAL: ICD-10-CM

## 2023-04-12 DIAGNOSIS — I48.19 PERSISTENT ATRIAL FIBRILLATION: ICD-10-CM

## 2023-04-12 DIAGNOSIS — Z79.4 INSULIN LONG-TERM USE: ICD-10-CM

## 2023-04-12 DIAGNOSIS — Z86.73 HISTORY OF STROKE: ICD-10-CM

## 2023-04-12 DIAGNOSIS — D69.2 SENILE PURPURA: ICD-10-CM

## 2023-04-12 DIAGNOSIS — E11.65 POORLY CONTROLLED TYPE 2 DIABETES MELLITUS WITH NEUROPATHY: ICD-10-CM

## 2023-04-12 DIAGNOSIS — F41.1 ANXIETY STATE: ICD-10-CM

## 2023-04-12 DIAGNOSIS — H35.3211 EXUDATIVE AGE-RELATED MACULAR DEGENERATION, RIGHT EYE, WITH ACTIVE CHOROIDAL NEOVASCULARIZATION: ICD-10-CM

## 2023-04-12 DIAGNOSIS — M17.0 PRIMARY OSTEOARTHRITIS OF BOTH KNEES: ICD-10-CM

## 2023-04-12 DIAGNOSIS — J43.9 COPD WITH CHRONIC BRONCHITIS AND EMPHYSEMA: ICD-10-CM

## 2023-04-12 DIAGNOSIS — I12.9 BENIGN HYPERTENSION WITH CHRONIC KIDNEY DISEASE, STAGE III: ICD-10-CM

## 2023-04-12 DIAGNOSIS — E11.319 POORLY CONTROLLED TYPE 2 DIABETES MELLITUS WITH RETINOPATHY: ICD-10-CM

## 2023-04-12 DIAGNOSIS — E11.40 POORLY CONTROLLED TYPE 2 DIABETES MELLITUS WITH NEUROPATHY: ICD-10-CM

## 2023-04-12 DIAGNOSIS — J44.89 COPD WITH CHRONIC BRONCHITIS AND EMPHYSEMA: ICD-10-CM

## 2023-04-12 DIAGNOSIS — E11.65 POORLY CONTROLLED TYPE 2 DIABETES MELLITUS WITH RETINOPATHY: ICD-10-CM

## 2023-04-12 DIAGNOSIS — N25.81 SECONDARY HYPERPARATHYROIDISM OF RENAL ORIGIN: ICD-10-CM

## 2023-04-12 DIAGNOSIS — G31.84 MCI (MILD COGNITIVE IMPAIRMENT): ICD-10-CM

## 2023-04-12 DIAGNOSIS — F33.0 MAJOR DEPRESSIVE DISORDER, RECURRENT EPISODE, MILD: ICD-10-CM

## 2023-04-12 DIAGNOSIS — G47.33 OBSTRUCTIVE SLEEP APNEA TREATED WITH BIPAP: ICD-10-CM

## 2023-04-12 PROCEDURE — 3077F SYST BP >= 140 MM HG: CPT | Mod: CPTII,S$GLB,, | Performed by: INTERNAL MEDICINE

## 2023-04-12 PROCEDURE — 99397 PER PM REEVAL EST PAT 65+ YR: CPT | Mod: GZ,S$GLB,, | Performed by: INTERNAL MEDICINE

## 2023-04-12 PROCEDURE — 3077F PR MOST RECENT SYSTOLIC BLOOD PRESSURE >= 140 MM HG: ICD-10-PCS | Mod: CPTII,S$GLB,, | Performed by: INTERNAL MEDICINE

## 2023-04-12 PROCEDURE — 1159F MED LIST DOCD IN RCRD: CPT | Mod: CPTII,S$GLB,, | Performed by: INTERNAL MEDICINE

## 2023-04-12 PROCEDURE — 1101F PR PT FALLS ASSESS DOC 0-1 FALLS W/OUT INJ PAST YR: ICD-10-PCS | Mod: CPTII,S$GLB,, | Performed by: INTERNAL MEDICINE

## 2023-04-12 PROCEDURE — 99397 PR PREVENTIVE VISIT,EST,65 & OVER: ICD-10-PCS | Mod: GZ,S$GLB,, | Performed by: INTERNAL MEDICINE

## 2023-04-12 PROCEDURE — 1126F AMNT PAIN NOTED NONE PRSNT: CPT | Mod: CPTII,S$GLB,, | Performed by: INTERNAL MEDICINE

## 2023-04-12 PROCEDURE — 99999 PR PBB SHADOW E&M-EST. PATIENT-LVL V: CPT | Mod: PBBFAC,,, | Performed by: INTERNAL MEDICINE

## 2023-04-12 PROCEDURE — 3288F PR FALLS RISK ASSESSMENT DOCUMENTED: ICD-10-PCS | Mod: CPTII,S$GLB,, | Performed by: INTERNAL MEDICINE

## 2023-04-12 PROCEDURE — 1101F PT FALLS ASSESS-DOCD LE1/YR: CPT | Mod: CPTII,S$GLB,, | Performed by: INTERNAL MEDICINE

## 2023-04-12 PROCEDURE — 1160F RVW MEDS BY RX/DR IN RCRD: CPT | Mod: CPTII,S$GLB,, | Performed by: INTERNAL MEDICINE

## 2023-04-12 PROCEDURE — 3078F PR MOST RECENT DIASTOLIC BLOOD PRESSURE < 80 MM HG: ICD-10-PCS | Mod: CPTII,S$GLB,, | Performed by: INTERNAL MEDICINE

## 2023-04-12 PROCEDURE — 99999 PR PBB SHADOW E&M-EST. PATIENT-LVL V: ICD-10-PCS | Mod: PBBFAC,,, | Performed by: INTERNAL MEDICINE

## 2023-04-12 PROCEDURE — 3288F FALL RISK ASSESSMENT DOCD: CPT | Mod: CPTII,S$GLB,, | Performed by: INTERNAL MEDICINE

## 2023-04-12 PROCEDURE — 1157F PR ADVANCE CARE PLAN OR EQUIV PRESENT IN MEDICAL RECORD: ICD-10-PCS | Mod: CPTII,S$GLB,, | Performed by: INTERNAL MEDICINE

## 2023-04-12 PROCEDURE — 3078F DIAST BP <80 MM HG: CPT | Mod: CPTII,S$GLB,, | Performed by: INTERNAL MEDICINE

## 2023-04-12 PROCEDURE — 1160F PR REVIEW ALL MEDS BY PRESCRIBER/CLIN PHARMACIST DOCUMENTED: ICD-10-PCS | Mod: CPTII,S$GLB,, | Performed by: INTERNAL MEDICINE

## 2023-04-12 PROCEDURE — 1126F PR PAIN SEVERITY QUANTIFIED, NO PAIN PRESENT: ICD-10-PCS | Mod: CPTII,S$GLB,, | Performed by: INTERNAL MEDICINE

## 2023-04-12 PROCEDURE — 1159F PR MEDICATION LIST DOCUMENTED IN MEDICAL RECORD: ICD-10-PCS | Mod: CPTII,S$GLB,, | Performed by: INTERNAL MEDICINE

## 2023-04-12 PROCEDURE — 1157F ADVNC CARE PLAN IN RCRD: CPT | Mod: CPTII,S$GLB,, | Performed by: INTERNAL MEDICINE

## 2023-04-12 NOTE — PROGRESS NOTES
CHIEF COMPLAINT:   Chief Complaint   Patient presents with    Annual Exam         HISTORY OF PRESENT ILLNESS:  Percy Ramirez is a 82 y.o. male who presents to the clinic today for a routine medical physical exam. His last physical exam was approximately 1 years(s) ago.    Objective    PAST MEDICAL HISTORY:  Past Medical History:   Diagnosis Date    Allergy     Arthritis     CAD, multiple vessel     DR Melissa    Cataract     Depression     History of colon polyps     Hyperlipidemia     Hypertension     Macular degeneration     Dr Razo for injection, Jennifer for eye    S/P CABG x 2     Type 2 diabetes mellitus with circulatory disorder     Dr. Aquino       PAST SURGICAL HISTORY:  Past Surgical History:   Procedure Laterality Date    broken elbow      left    COLONOSCOPY N/A 10/4/2017    Procedure: COLONOSCOPY;  Surgeon: Gabriel Leung MD;  Location: [facility];  Service: Endoscopy;  Laterality: N/A;    EYE SURGERY      cataract    heart bypass      2004    HERNIA REPAIR      right    ROTATOR CUFF REPAIR      right  2004       SOCIAL HISTORY:  Social History     Socioeconomic History    Marital status:     Number of children: 4    Years of education: GED   Occupational History    Occupation: retired tug    Tobacco Use    Smoking status: Former     Packs/day: 3.00     Years: 45.00     Pack years: 135.00     Types: Cigarettes     Quit date: 2004     Years since quittin.9    Smokeless tobacco: Former   Substance and Sexual Activity    Alcohol use: Yes     Comment: was heavy drinker 35 years ago, rare use now    Drug use: No    Sexual activity: Yes     Partners: Female       FAMILY HISTORY:  Family History   Problem Relation Age of Onset    Arthritis Mother     Diabetes Mother     Stroke Mother     Heart attack Father     Cancer Brother     Throat cancer Brother     Diabetes Maternal Aunt     Diabetes Maternal Uncle     Heart disease Maternal Uncle     Diabetes Paternal Aunt     Heart  "disease Paternal Aunt     Heart disease Paternal Uncle     Heart disease Maternal Grandmother     Cancer Maternal Grandfather        ALLERGIES AND MEDICATIONS: updated and reviewed.  Review of patient's allergies indicates:   Allergen Reactions    Aricept odt [donepezil] Diarrhea and Nausea And Vomiting    Codeine Nausea Only     weak    Ranexa [ranolazine]      Medication List with Changes/Refills   Current Medications    ACETAMINOPHEN (TYLENOL) 325 MG TABLET    Take 2 tablets (650 mg total) by mouth every 6 (six) hours as needed.    ALBUTEROL (PROVENTIL/VENTOLIN HFA) 90 MCG/ACTUATION INHALER    INHALE 2 PUFFS BY MOUTH EVERY 6 HOURS AS NEEDED FOR WHEEZING OR  SHORTNESS  OF  BREATH    ATORVASTATIN (LIPITOR) 40 MG TABLET    Take 40 mg by mouth once daily.    BD INSULIN PEN NEEDLE UF SHORT 31 GAUGE X 5/16" NDLE        BD INSULIN SYRINGE ULTRA-FINE 1/2 ML 31 GAUGE X 15/64" SYRG        BLOOD SUGAR DIAGNOSTIC STRP    To check BG 3 times daily, to use with insurance preferred meter    BLOOD-GLUCOSE METER KIT    To check BG 3 times daily, to use with insurance preferred meter    CARVEDILOL (COREG) 25 MG TABLET    Take 1 tablet (25 mg total) by mouth 2 (two) times daily.    CERAMIDES 1,3,6-II (CERAVE DAILY MOISTURIZING) LOTN    Apply twice daily to skin    CINNAMON BARK 500 MG CAPSULE    Take 1,000 mg by mouth once daily.      CLOPIDOGREL (PLAVIX) 75 MG TABLET    Take 75 mg by mouth.    DICLOFENAC SODIUM (VOLTAREN) 1 % GEL    Apply 2 g topically 2 (two) times daily.    DICLOFENAC SODIUM (VOLTAREN) 1 % GEL    Apply 2 g topically 3 (three) times daily.    DULOXETINE (CYMBALTA) 30 MG CAPSULE    Take 1 capsule (30 mg total) by mouth once daily.    ENTRESTO  MG PER TABLET    Take 1 tablet by mouth 2 (two) times daily.    FLUTICASONE PROPIONATE (FLONASE) 50 MCG/ACTUATION NASAL SPRAY    1 spray (50 mcg total) by Each Nostril route 2 (two) times daily.    FUROSEMIDE (LASIX) 40 MG TABLET    Take 40 mg by mouth once daily.    " "GABAPENTIN (NEURONTIN) 300 MG CAPSULE    Take 4 capsules (1,200 mg total) by mouth 2 (two) times daily.    GLIMEPIRIDE (AMARYL) 4 MG TABLET    Take 4 mg by mouth daily with breakfast.     HYDROCODONE-ACETAMINOPHEN (NORCO) 5-325 MG PER TABLET    Take 1 tablet by mouth every 8 (eight) hours as needed for Pain.    HYDROCODONE-ACETAMINOPHEN (NORCO) 5-325 MG PER TABLET    Take 1 tablet by mouth every 8 (eight) hours as needed for Pain.    HYDROCODONE-ACETAMINOPHEN (NORCO) 5-325 MG PER TABLET    Take 1 tablet by mouth every 8 (eight) hours as needed for Pain.    HYDROXYZINE HCL (ATARAX) 25 MG TABLET    Take 1 tablet (25 mg total) by mouth 3 (three) times daily as needed for Itching or Anxiety.    INSULIN NPH-INSULIN REGULAR, 70/30, (NOVOLIN 70/30) 100 UNIT/ML (70-30) INJECTION    Inject into the skin. Inject 10 units at breakfast and inject 10 units at night    INSULIN SYRINGE-NEEDLE U-100 0.3 ML 31 GAUGE X 15/64" SYRG    USE TO INJECT INSULIN THREE TIMES DAILY    INSULIN SYRINGE-NEEDLE U-100 1/2 ML 31 X 5/16" SYRG        IPRATROPIUM (ATROVENT) 42 MCG (0.06 %) NASAL SPRAY    2 sprays by Nasal route every 6 (six) hours as needed for Rhinitis (use as needed for runny nose and also use 15 minutes prior to meal). Clean nose with saline prior to use    ISOSORBIDE MONONITRATE (IMDUR) 30 MG 24 HR TABLET    Take 30 mg by mouth once daily.    LANCETS MISC    To check BG 3 times daily, to use with insurance preferred meter    LIDOCAINE (LIDODERM) 5 %    Place 1 patch onto the skin once daily. Remove & Discard patch within 12 hours or as directed by MD    METFORMIN (GLUCOPHAGE) 500 MG TABLET    Take 1,000 mg by mouth 2 (two) times daily with meals. 2 Tablets in the morning, 2 Tablets in the evening    METHOCARBAMOL (ROBAXIN) 500 MG TAB    TAKE 1 TABLET BY MOUTH 4 TIMES DAILY FOR 10 DAYS    METOCLOPRAMIDE HCL (REGLAN) 5 MG TABLET    Take 5 mg by mouth 3 (three) times daily as needed.    NITROGLYCERIN (NITROSTAT) 0.4 MG SL TABLET    " DISSOLVE 1 TABLET UNDER THE TONGUE EVERY 5 MINUTES AS  NEEDED FOR CHEST PAIN. MAX  OF 3 TABLETS IN 15 MINUTES. CALL 911 IF PAIN PERSISTS.    PRAVASTATIN (PRAVACHOL) 20 MG TABLET    Take 1 tablet (20 mg total) by mouth once daily.    SPIRONOLACTONE (ALDACTONE) 25 MG TABLET    Take 25 mg by mouth.    SPIRONOLACTONE (ALDACTONE) 25 MG TABLET    Take 12.5 mg by mouth.    TIOTROPIUM-OLODATEROL (STIOLTO RESPIMAT) 2.5-2.5 MCG/ACTUATION MIST    Inhale 1 puff into the lungs once daily. Controller    TRIAMCINOLONE ACETONIDE 0.1% (KENALOG) 0.1 % CREAM    SMARTSI Application Topical 2-3 Times Daily    VALSARTAN (DIOVAN) 80 MG TABLET    Take 80 mg by mouth.    VIT C/VIT E AC/LUT/COPPER/ZINC (PRESERVISION LUTEIN ORAL)    Take by mouth.    WARFARIN (COUMADIN) 5 MG TABLET    Take 2 tabs by mouth on Tuesday,Thursday, Saturday and Sundays then 1.5 on all other days.         CARE TEAM:  Patient Care Team:  Kasie Edmondson MD as PCP - General (Internal Medicine)  Jac Rodriguez OD as Consulting Provider (Optometry)  Trever Arevalo MD as Consulting Physician (Ophthalmology)  Philippe Aquino MD as Consulting Physician (Endocrinology)  Mac Valderrama MD as Consulting Physician (Cardiology)  Marifer Romero DPM as Consulting Physician (Podiatry)  Remedios Madison LPN as Care Coordinator              SCREENING HISTORY:  Health Maintenance         Date Due Completion Date    Colonoscopy 10/04/2020 10/4/2017    Override on 2007: Done    TETANUS VACCINE 2023    Hemoglobin A1c 2023    Override on 2016: Done (A1c - 7.5)    Override on 2016: Done (7.0)    Override on 2015: Done (7.9%)    Diabetes Urine Screening 2023    Eye Exam 2023    Override on 2/3/2017: Done (Dr. Arevalo)    Override on 2016: Done (Jennifer - mild bilateral diabetic retinopathy; severe bilateral dry macular degeneration)    Override on 2015: Done    Override on  "10/10/2014: Done    Lipid Panel 12/13/2023 12/13/2022    Override on 6/6/2016: Done (total 104, HDL 32, LDL 57, TG 73)    Override on 1/27/2015: Done (HDL=36, TG=38, LDL=57)              REVIEW OF SYSTEMS:   The patient reports : fair dietary habits.  The patient reports : that they are unable to exercise regularly because  of orthopaedic limitations.  Review of Systems   Constitutional:  Negative for chills and fever.   HENT:  Negative for congestion.    Eyes:  Positive for visual disturbance (chronic; followed by ophthalmology).   Respiratory:  Negative for cough and shortness of breath.    Cardiovascular:  Positive for leg swelling (chronic; stable). Negative for chest pain.   Gastrointestinal:  Negative for abdominal pain.   Genitourinary:  Negative for dysuria.   Musculoskeletal:  Positive for arthralgias.        He reports toe pain today as he saw podiatry yesterday and they did a procedure on his ingrown toenails.   ROS : patient denies: difficulty initiating urination          PHYSICAL EXAM:  274}  Vitals:    04/12/23 1347   BP: (!) 140/70   BP Location: Left arm   Patient Position: Sitting   BP Method: Medium (Manual)   Pulse: 73   Temp: 97.5 °F (36.4 °C)   TempSrc: Oral   SpO2: (!) 93%   Weight: 87.6 kg (193 lb 3.7 oz)   Height: 5' 8" (1.727 m)       Body mass index is 29.38 kg/m².    General appearance - alert, well appearing, and in no distress, overweight, exam limited as patient did not get onto the examination table  Psychiatric - alert, oriented to person, place, and time, normal behavior, speech, dress, motor activity and thought process  Eyes - sclera anicteric  Mouth - not examined  Neck - supple, no significant adenopathy, carotids upstroke normal bilaterally, no bruits  Lymphatics - no palpable cervical lymphadenopathy  Chest - clear to auscultation, no wheezes, rales or rhonchi, symmetric air entry  Heart - normal rate and regular rhythm  Neurological - alert, normal speech, no focal " findings; cranial nerves II through XII intact  Musculoskeletal - patient noted to have Moderate osteoarthritic changes to both knee joints. No joint effusions noted.  Extremities - pedal edema 1+  Skin - normal coloration, no suspicious skin lesions; mild senile purpura noted on UEs      Labs:  Lab Results   Component Value Date    HGBA1C 8.0 (H) 04/05/2023    HGBA1C 8.3 (H) 12/13/2022    HGBA1C 8.1 (H) 08/30/2022      Lab Results   Component Value Date    CHOL 98 (L) 10/22/2013     Lab Results   Component Value Date    LDLCALC 44 12/13/2022    LDLCALC 45 08/30/2022    LDLCALC 53 06/01/2022           ASSESSMENT AND PLAN: 274}  1. Routine medical exam  Counseled on age appropriate medical preventative services including age appropriate cancer screenings, age appropriate eye and dental exams, over all nutritional health, need for a consistent exercise regimen, and an over all push towards maintaining a vigorous and active lifestyle.  Counseled on age appropriate vaccines and discussed upcoming health care needs based on age/gender. Discussed good sleep hygiene and stress management.    2. 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 Poorly controlled type 2 diabetes mellitus with neuropathy/4. Type 2 diabetes, with peripheral circulatory disorder not at goal/5. Insulin long-term use  Lab Results   Component Value Date    HGBA1C 8.0 (H) 04/05/2023     Diabetes is under fair control at this time (due to hyperglycemia) for age and comorbid conditions.   We discussed diabetic diet and regular exercise.   We discussed home blood sugar monitoring, if appropriate - the patient should test twice daily and as needed. Recommended to patient to test 2 hours after eating a large meal to see how it affects his blood sugar and make dietary adjustments accordingly.  Continue current medication regimen. Patient is followed by endocrinology.  Diabetic complications addressed: Neuropathy pain controlled.  Patient  denies claudication symptoms at this time.  Patient was counseled on the need for yearly eye exam to screen for/monitor diabetic retinopathy and yearly diabetic foot exam.  Overview:  Followed by endocrinology, Dr. Aquino    6. Benign hypertension with chronic kidney disease, stage III  BP Readings from Last 1 Encounters:   04/12/23 (!) 140/70      Discussed sodium restriction, maintaining ideal body weight and regular exercise program as physiologic means to achieve blood pressure control. The patient will strive towards this.   The current medical regimen is effective;  continue present plan and medications. Recommended patient to check home readings to monitor and see me for followup as scheduled or sooner as needed.   Patient was educated that both decongestant and anti-inflammatory medication may raise blood pressure.  Stable decreased kidney function. Observe. Patient counseled to avoid/minimize the use of anti-inflammatory  Medication. Discussed to stay well hydrated. Also discussed with patient that good control of blood pressure and/or diabetes, if present, will help to prevent progression.  The patient is not active on the digital hypertension program.    7. Coronary artery disease of native artery of native heart with stable angina pectoris/8. Persistent atrial fibrillation/9. Chronic stable angina/10. Chronic combined systolic and diastolic heart failure  The current medical regimen is effective;  continue present plan and medications.   Followed by: Cardiology.   Overview:  - followed by cardiology, Dr. Melissa    Overview:   s/p CABG  Overview:  - on coumadin  - followed by [facility]  Overview:  Likely contributing to CORTES      11. Bilateral carotid artery disease, unspecified type  Stable. Asymptomatic. Observe.    12. COPD with chronic bronchitis and emphysema  The current medical regimen is effective;  continue present plan and medications.   Overview:   Quit smoking since 1960s.    Declining lung function, recommend compliance with trelegy. [employer] application for medication assistance.   Will see if stiolto better cover.  Patient is up to date with vaccination.    Declines pulmonary rehab  On home oxygen which helps      13. ILD (interstitial lung disease)  The current medical regimen is effective;  continue present plan and medications.   Overview:  Emphysematous changes on ct along with basilar reticulation.  pft with moderate obstructive physiology and mild restrictive physiology.  dlco significantly reduced (pt on home oxygen)  Monitor lung function stable restrictive physiology      14. History of stroke  Stable/asymptomatic.  We discussed risk factor reduction.  Overview:  - right occipital      15. MCI (mild cognitive impairment)  The current medical regimen is effective;  continue present plan and medications.   Overview:  Patient vision, lack of activity, and alf have all contribute to the patient's decline in his overall mental health.  No evidence of dementia.  Patient stable on current dose of Aricept.      16. Major depressive disorder, recurrent episode, mild/17. Anxiety state, unspecified  The current medical regimen is effective;  continue present plan and medications.   Overview:  Dx updated per 2019 IMO Load      18. Senile purpura  Stable. Asymptomatic. Observe.    19. Secondary hyperparathyroidism of renal origin  Stable. Asymptomatic. Observe.    20. Primary osteoarthritis of both knees  The current medical regimen is effective;  continue present plan and medications.   Followed by: Orthopaedics.   Overview:  - followed by Dr. Cardenas      21. Obstructive sleep apnea treated with BiPAP  CPAP compliance: yes. The patient reports that they continue to benefit from regular use of their CPAP machine..  We discussed the potential ramifications of untreated sleep apnea, which could include daytime sleepiness, hypertension, heart disease including CHF, sudden death while  sleeping and/or stroke. The patient was advised to abstain from driving should they feel sleepy or drowsy.  We discussed potential treatment options, which could include weight loss, body positioning, continuous positive airway pressure (CPAP), or referral for surgical consideration.   Overview:  ahi of 31.7.  Patient is using and benefiting from bipap. Residual predicted AHI slightly sub-optimal (large mask leak) but significant improved from baseline and pt reports sleeping well.       22. Chronic vertigo  Stable. Medication as needed.  Overview:  Discussed use of meclizine is his primary medication to address vertigo.  Valium was prescribed for about start are under control with this medication along.  Discussed that the medication can and will cause increased balance dysfunction and possible weakness given is nature.  Patient advised that if he uses the medication he should not he is about more than necessary in order to prevent or reduce his risk of falls.  Patient voiced understanding      23. Overweight (BMI 25.0-29.9)  BMI Readings from Last 3 Encounters:   04/12/23 29.38 kg/m²   04/11/23 28.89 kg/m²   03/24/23 28.98 kg/m²     The patient is asked to make an attempt to improve diet and exercise patterns to aid in medical management of this problem.    24. Ambulates with cane  We discussed fall precautions.    25. Exudative age-related macular degeneration, right eye, with active choroidal neovascularization  Followed by ophthalmology.           No orders of the defined types were placed in this encounter.      Follow up in about 6 months (around 10/12/2023), or if symptoms worsen or fail to improve, for follow up chronic medical conditions.. or sooner as needed.

## 2023-04-13 ENCOUNTER — TELEPHONE (OUTPATIENT)
Dept: FAMILY MEDICINE | Facility: CLINIC | Age: 83
End: 2023-04-13
Payer: MEDICARE

## 2023-04-13 NOTE — TELEPHONE ENCOUNTER
Called patient and he isn't due for his T dap until 5/23/23.  Patient will call  to the date to schedule.

## 2023-04-13 NOTE — TELEPHONE ENCOUNTER
----- Message from Marquita Reynaga sent at 4/12/2023  3:24 PM CDT -----  Type: Patient Call Back    Who called: self    What is the request in detail: please call patient to schedule a Tdap    Can the clinic reply by [facility]? no    Would the patient rather a call back or a response via [facility]?  call    Best call back number: .[phone] (home)      Additional Information:

## 2023-04-19 LAB
LEFT EYE DM RETINOPATHY: POSITIVE
RIGHT EYE DM RETINOPATHY: POSITIVE

## 2023-04-24 NOTE — PROGRESS NOTES
Subjective:      Patient ID: Percy Ramirez is a 82 y.o. male.    Chief Complaint: Diabetes Mellitus (Gregory Saldivar MD at 1/12/2023) and Nail Care      Percy is a 82 y.o. male who presents to the clinic for evaluation and treatment of high risk feet. Percy has a past medical history of Allergy, Arthritis, CAD, multiple vessel, Cataract, Depression, History of colon polyps, Hyperlipidemia, Hypertension, Macular degeneration, S/P CABG x 2, and Type 2 diabetes mellitus with circulatory disorder. The patient's chief complaint is elongated, thickened toenails aggravated by increased weight bearing, shoe gear, pressure. Hypersensitive to the distal toes. This patient has documented high risk feet requiring routine maintenance secondary to diabetes mellitis and those secondary complications of diabetes, as mentioned..    PCP: Kasie Edmondson MD    Date Last Seen by PCP:   Chief Complaint   Patient presents with    Diabetes Mellitus     Gregory Saldivar MD at 1/12/2023    Nail Care     Current shoe gear:  rx shoes    Hemoglobin A1C   Date Value Ref Range Status   04/05/2023 8.0 (H) 4.0 - 5.6 % Final     Comment:     ADA Screening Guidelines:  5.7-6.4%  Consistent with prediabetes  >or=6.5%  Consistent with diabetes    High levels of fetal hemoglobin interfere with the HbA1C  assay. Heterozygous hemoglobin variants (HbS, HgC, etc)do  not significantly interfere with this assay.   However, presence of multiple variants may affect accuracy.     12/13/2022 8.3 (H) 4.0 - 5.6 % Final     Comment:     [facility]   08/30/2022 8.1 (H) 4.0 - 5.6 % Final     Comment:     [facility]   06/01/2022 8.4 (H) 4.0 - 5.6 % Final     Comment:     [facility]     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    Coronary artery disease    S/P CABG x 2    Macular  "degeneration    Obstructive sleep apnea treated with BiPAP    Anxiety state, unspecified    Insulin long-term use    Primary osteoarthritis of both knees    Chronic low back pain    DJD (degenerative joint disease), lumbar    Poorly controlled type 2 diabetes mellitus with neuropathy    Bilateral carotid artery disease    Hyperlipidemia LDL goal <100    Type 2 diabetes, with peripheral circulatory disorder not at goal    COPD with chronic bronchitis and emphysema    Atherosclerosis of abdominal aorta    Overweight (BMI 25.0-29.9)    Persistent atrial fibrillation    Chronic stable angina    Senile purpura    Osteoarthritis of carpometacarpal (CMC) joint of left thumb    MCI (mild cognitive impairment)    Chronic vertigo    Pulmonary nodule    Dyspnea on exertion    Chronic combined systolic and diastolic heart failure    Ambulates with cane    ILD (interstitial lung disease)    History of cardioversion    Erectile dysfunction    Secondary hyperparathyroidism of renal origin    Noncompliance with medication regimen    Exudative age-related macular degeneration, right eye, with active choroidal neovascularization    History of stroke    Chronic tension-type headache, intractable    Spinal stenosis of lumbar region with neurogenic claudication    Lumbar radiculopathy    Lumbar spondylosis    DDD (degenerative disc disease), lumbar     Current Outpatient Medications on File Prior to Visit   Medication Sig Dispense Refill    acetaminophen (TYLENOL) 325 MG tablet Take 2 tablets (650 mg total) by mouth every 6 (six) hours as needed. 13 tablet 0    albuterol (PROVENTIL/VENTOLIN HFA) 90 mcg/actuation inhaler INHALE 2 PUFFS BY MOUTH EVERY 6 HOURS AS NEEDED FOR WHEEZING OR  SHORTNESS  OF  BREATH 54 g 0    atorvastatin (LIPITOR) 40 MG tablet Take 40 mg by mouth once daily.      BD INSULIN PEN NEEDLE UF SHORT 31 gauge x 5/16" Ndle       BD INSULIN SYRINGE ULTRA-FINE 1/2 mL 31 gauge x 15/64" Syrg       blood sugar diagnostic Strp " To check BG 3 times daily, to use with insurance preferred meter 200 strip 11    carvedilol (COREG) 25 MG tablet Take 1 tablet (25 mg total) by mouth 2 (two) times daily. 180 tablet 0    ceramides 1,3,6-II (CERAVE DAILY MOISTURIZING) Lotn Apply twice daily to skin 355 mL 1    cinnamon bark 500 mg capsule Take 1,000 mg by mouth once daily.        clopidogreL (PLAVIX) 75 mg tablet Take 75 mg by mouth.      diclofenac sodium (VOLTAREN) 1 % Gel Apply 2 g topically 2 (two) times daily. 100 g 0    diclofenac sodium (VOLTAREN) 1 % Gel Apply 2 g topically 3 (three) times daily. 50 g 0    DULoxetine (CYMBALTA) 30 MG capsule Take 1 capsule (30 mg total) by mouth once daily. 30 capsule 0    ENTRESTO  mg per tablet Take 1 tablet by mouth 2 (two) times daily.      fluticasone propionate (FLONASE) 50 mcg/actuation nasal spray 1 spray (50 mcg total) by Each Nostril route 2 (two) times daily. 18.2 mL 3    furosemide (LASIX) 40 MG tablet Take 40 mg by mouth once daily.      gabapentin (NEURONTIN) 300 MG capsule Take 4 capsules (1,200 mg total) by mouth 2 (two) times daily. (Patient taking differently: Take 900 mg by mouth 2 (two) times daily.) 540 capsule 1    glimepiride (AMARYL) 4 MG tablet Take 4 mg by mouth daily with breakfast.       [] HYDROcodone-acetaminophen (NORCO) 5-325 mg per tablet Take 1 tablet by mouth every 8 (eight) hours as needed for Pain. 60 tablet 0    HYDROcodone-acetaminophen (NORCO) 5-325 mg per tablet Take 1 tablet by mouth every 8 (eight) hours as needed for Pain. 60 tablet 0    [START ON 2023] HYDROcodone-acetaminophen (NORCO) 5-325 mg per tablet Take 1 tablet by mouth every 8 (eight) hours as needed for Pain. 60 tablet 0    hydrOXYzine HCL (ATARAX) 25 MG tablet Take 1 tablet (25 mg total) by mouth 3 (three) times daily as needed for Itching or Anxiety. (Patient not taking: Reported on 2023) 90 tablet 3    insulin NPH-insulin regular, 70/30, (NOVOLIN 70/30) 100 unit/mL (70-30)  "injection Inject into the skin. Inject 10 units at breakfast and inject 10 units at night      insulin syringe-needle U-100 0.3 mL 31 gauge x 15/64" Syrg USE TO INJECT INSULIN THREE TIMES DAILY      insulin syringe-needle U-100 1/2 mL 31 x 5/16" Syrg       ipratropium (ATROVENT) 42 mcg (0.06 %) nasal spray 2 sprays by Nasal route every 6 (six) hours as needed for Rhinitis (use as needed for runny nose and also use 15 minutes prior to meal). Clean nose with saline prior to use 15 mL 3    isosorbide mononitrate (IMDUR) 30 MG 24 hr tablet Take 30 mg by mouth once daily.      lancets Misc To check BG 3 times daily, to use with insurance preferred meter 200 each 11    LIDOcaine (LIDODERM) 5 % Place 1 patch onto the skin once daily. Remove & Discard patch within 12 hours or as directed by MD (Patient not taking: Reported on 2023) 5 patch 1    metformin (GLUCOPHAGE) 500 MG tablet Take 1,000 mg by mouth 2 (two) times daily with meals. 2 Tablets in the morning, 2 Tablets in the evening      methocarbamoL (ROBAXIN) 500 MG Tab TAKE 1 TABLET BY MOUTH 4 TIMES DAILY FOR 10 DAYS (Patient not taking: Reported on 2023) 60 tablet 0    metoclopramide HCl (REGLAN) 5 MG tablet Take 5 mg by mouth 3 (three) times daily as needed.      nitroGLYCERIN (NITROSTAT) 0.4 MG SL tablet DISSOLVE 1 TABLET UNDER THE TONGUE EVERY 5 MINUTES AS  NEEDED FOR CHEST PAIN. MAX  OF 3 TABLETS IN 15 MINUTES. CALL 911 IF PAIN PERSISTS.      pravastatin (PRAVACHOL) 20 MG tablet Take 1 tablet (20 mg total) by mouth once daily. 90 tablet 1    spironolactone (ALDACTONE) 25 MG tablet Take 25 mg by mouth.      tiotropium-olodateroL (STIOLTO RESPIMAT) 2.5-2.5 mcg/actuation Mist Inhale 1 puff into the lungs once daily. Controller 1 g 11    triamcinolone acetonide 0.1% (KENALOG) 0.1 % cream SMARTSI Application Topical 2-3 Times Daily      valsartan (DIOVAN) 80 MG tablet Take 80 mg by mouth.      VIT C/VIT E AC/LUT/COPPER/ZINC (PRESERVISION LUTEIN ORAL) Take " by mouth.      warfarin (COUMADIN) 5 MG tablet Take 2 tabs by mouth on Tuesday,Thursday, Saturday and Sundays then 1.5 on all other days.      blood-glucose meter kit To check BG 3 times daily, to use with insurance preferred meter 1 each 0     No current facility-administered medications on file prior to visit.     Review of patient's allergies indicates:   Allergen Reactions    Codeine Nausea Only     weak     Past Surgical History:   Procedure Laterality Date    broken elbow      left    COLONOSCOPY N/A 10/4/2017    Procedure: COLONOSCOPY;  Surgeon: Gabriel Leung MD;  Location: [facility];  Service: Endoscopy;  Laterality: N/A;    EYE SURGERY      cataract    heart bypass      2004    HERNIA REPAIR      right    ROTATOR CUFF REPAIR      right  2004     Family History   Problem Relation Age of Onset    Arthritis Mother     Diabetes Mother     Stroke Mother     Heart attack Father     Cancer Brother     Throat cancer Brother     Diabetes Maternal Aunt     Diabetes Maternal Uncle     Heart disease Maternal Uncle     Diabetes Paternal Aunt     Heart disease Paternal Aunt     Heart disease Paternal Uncle     Heart disease Maternal Grandmother     Cancer Maternal Grandfather      Social History     Socioeconomic History    Marital status:     Number of children: 4    Years of education: GED   Occupational History    Occupation: retired tug    Tobacco Use    Smoking status: Former     Packs/day: 3.00     Years: 45.00     Pack years: 135.00     Types: Cigarettes     Quit date: 2004     Years since quittin.0    Smokeless tobacco: Former   Substance and Sexual Activity    Alcohol use: Yes     Comment: was heavy drinker 35 years ago, rare use now    Drug use: No    Sexual activity: Yes     Partners: Female     Review of Systems   Constitution: Negative for chills, fever and weakness.   Cardiovascular: Negative for claudication and leg swelling.   Respiratory: Positive for cough. Negative for  "shortness of breath.    Skin: Positive for dry skin and nail changes. Negative for itching and rash.   Musculoskeletal: Positive for arthritis, back pain and joint pain. Negative for falls, joint swelling and muscle weakness.   Gastrointestinal: Negative for diarrhea, nausea and vomiting.   Neurological: Positive for numbness and paresthesias. Negative for tremors.   Psychiatric/Behavioral: Negative for altered mental status and hallucinations.           Objective:       Vitals:    04/11/23 0957   Weight: 86.2 kg (190 lb)   Height: 5' 8" (1.727 m)   PainSc: 10-Worst pain ever   PainLoc: Toe       Physical Exam   Constitutional:   General: Pt. is well-developed, well-nourished, appears stated age, in no acute distress, alert and oriented x 3. No evidence of depression, anxiety, or agitation. Calm, cooperative, and communicative. Appropriate interactions and affect.       Cardiovascular:   Pulses:       Dorsalis pedis pulses are 2+ on the right side, and 2+ on the left side.        Posterior tibial pulses are 1+ on the right side, and 1+ on the left side.   There is decreased digital hair.   Musculoskeletal:        Right foot: There is normal range of motion.        Left foot:  There is normal range of motion.   Muscle strength is 5/5 in all groups bilaterally.    Decreased stride, station of gait.  apropulsive toe off.  Increased angle and base of gait.    Patient has hammertoes of digits 2-5 bilateral partially reducible without symptom today.    Visible and palpable bunion without pain at dorsomedial 1st metatarsal head right and left.  Hallux abducted right and left partially reducible, tracks laterally without being track bound.  No ecchymosis, erythema, edema, or cardinal signs infection or signs of trauma same foot.     Neurological: A sensory deficit is present.   Kenna-Keon 5.07 monofilamant testing is diminished Farhad feet. Sharp/dull sensation diminished Bilaterally   Skin: Skin is warm, dry. No " abrasion, no ecchymosis, no rash noted. He is not diaphoretic. No cyanosis. No pallor. Nails show no clubbing.   Medial and lateral hallux nail margin of right foot with ingrown nail plate and thick HPK. Surrounding erythema and minimal edema is noted there is no granuloma formation noted. no malodor     Toenails 1-5 bilaterally are elongated by 2-3 mm, thickened by 2-3 mm, discolored/yellowed, dystrophic, brittle with subungual debris.    Focal hyperkeratotic lesion consisting entirely of hyperkeratotic tissue without open skin, drainage, pus, fluctuance, malodor, or signs of infection: medial IPJ of hallux concha    Interdigital Spaces clean, dry and without evidence of break in skin integrity   Psychiatric: He has a normal mood and affect. His speech is normal.   Nursing note and vitals reviewed.        Assessment:       Encounter Diagnoses   Name Primary?    Type II diabetes mellitus with neurological manifestations Yes    Onychomycosis due to dermatophyte     Corn or callus          Plan:       Percy was seen today for diabetes mellitus and nail care.    Diagnoses and all orders for this visit:    Type II diabetes mellitus with neurological manifestations    Onychomycosis due to dermatophyte    Corn or callus      I counseled the patient on his conditions, their implications and medical management.      - Shoe inspection. Diabetic Foot Education. Patient reminded of the importance of good nutrition and blood sugar control to help prevent podiatric complications of diabetes. Patient instructed on proper foot hygeine. We discussed wearing proper shoe gear, daily foot inspections, never walking without protective shoe gear, never putting sharp instruments to feet    - With patient's permission, nails were aggressively reduced and debrided x 10 to their soft tissue attachment mechanically and with electric , removing all offending nail and debris. Patient relates relief following the procedure. Utilizing  sterile toenail clippers I aggressively trimmed  the offending right hallux  nail border approximately 3 mm from its edge and carried the nail plate incision down at an angle in order to wedge out the offending cryptotic portion of the nail plate. The offending border was then removed in toto. The remaining nail was grinded down with an electric  down to nail bed.  Silver nitrate to any abrasions. Patient tolerated the procedure well and related significant relief.    - After cleansing the  area w/ alcohol prep pad the above mentioned hyperkeratosis was trimmed utilizing No 15 scapel, to a smooth base with out incident. Patient tolerated this  well and reported comfort to the area of medial hallux IPJ concha    - He will continue to monitor the areas daily, inspect his feet, wear protective shoe gear when ambulatory, moisturizer to maintain skin integrity and follow in this office in approximately 2-3 months, sooner PRN

## 2023-04-28 ENCOUNTER — PATIENT OUTREACH (OUTPATIENT)
Dept: ADMINISTRATIVE | Facility: HOSPITAL | Age: 83
End: 2023-04-28
Payer: MEDICARE

## 2023-04-28 ENCOUNTER — TELEPHONE (OUTPATIENT)
Dept: FAMILY MEDICINE | Facility: CLINIC | Age: 83
End: 2023-04-28
Payer: MEDICARE

## 2023-04-28 VITALS — DIASTOLIC BLOOD PRESSURE: 68 MMHG | SYSTOLIC BLOOD PRESSURE: 117 MMHG

## 2023-04-28 NOTE — PROGRESS NOTES
Eye exam received and updated.    Non-Compliant Blood Pressure Reading - the patient reports that he does have a blood pressure cuff at home, he will call back with a reading.    Labs updated.

## 2023-04-28 NOTE — PROGRESS NOTES
Incoming coming call received from the patient with his blood pressure readings as followed:    /83 taken on 04/28/2023  /72 taken on 04/28/2023  /68 taken on 04/28/2023    BP's recorded.

## 2023-05-01 ENCOUNTER — OFFICE VISIT (OUTPATIENT)
Dept: PULMONOLOGY | Facility: CLINIC | Age: 83
End: 2023-05-01
Payer: MEDICARE

## 2023-05-01 VITALS
DIASTOLIC BLOOD PRESSURE: 52 MMHG | BODY MASS INDEX: 32.42 KG/M2 | SYSTOLIC BLOOD PRESSURE: 94 MMHG | HEART RATE: 79 BPM | WEIGHT: 213.94 LBS | HEIGHT: 68 IN | OXYGEN SATURATION: 86 %

## 2023-05-01 DIAGNOSIS — Z71.89 GOALS OF CARE, COUNSELING/DISCUSSION: ICD-10-CM

## 2023-05-01 DIAGNOSIS — J84.9 ILD (INTERSTITIAL LUNG DISEASE): ICD-10-CM

## 2023-05-01 DIAGNOSIS — G47.33 OBSTRUCTIVE SLEEP APNEA TREATED WITH BIPAP: ICD-10-CM

## 2023-05-01 DIAGNOSIS — I50.42 CHRONIC COMBINED SYSTOLIC AND DIASTOLIC HEART FAILURE: ICD-10-CM

## 2023-05-01 DIAGNOSIS — J44.89 COPD WITH CHRONIC BRONCHITIS AND EMPHYSEMA: ICD-10-CM

## 2023-05-01 DIAGNOSIS — J43.9 COPD WITH CHRONIC BRONCHITIS AND EMPHYSEMA: ICD-10-CM

## 2023-05-01 PROCEDURE — 3078F PR MOST RECENT DIASTOLIC BLOOD PRESSURE < 80 MM HG: ICD-10-PCS | Mod: CPTII,S$GLB,, | Performed by: INTERNAL MEDICINE

## 2023-05-01 PROCEDURE — 3074F SYST BP LT 130 MM HG: CPT | Mod: CPTII,S$GLB,, | Performed by: INTERNAL MEDICINE

## 2023-05-01 PROCEDURE — 1159F PR MEDICATION LIST DOCUMENTED IN MEDICAL RECORD: ICD-10-PCS | Mod: CPTII,S$GLB,, | Performed by: INTERNAL MEDICINE

## 2023-05-01 PROCEDURE — 1126F PR PAIN SEVERITY QUANTIFIED, NO PAIN PRESENT: ICD-10-PCS | Mod: CPTII,S$GLB,, | Performed by: INTERNAL MEDICINE

## 2023-05-01 PROCEDURE — 99214 OFFICE O/P EST MOD 30 MIN: CPT | Mod: S$GLB,,, | Performed by: INTERNAL MEDICINE

## 2023-05-01 PROCEDURE — 99999 PR PBB SHADOW E&M-EST. PATIENT-LVL IV: CPT | Mod: PBBFAC,,, | Performed by: INTERNAL MEDICINE

## 2023-05-01 PROCEDURE — 1157F ADVNC CARE PLAN IN RCRD: CPT | Mod: CPTII,S$GLB,, | Performed by: INTERNAL MEDICINE

## 2023-05-01 PROCEDURE — 1157F PR ADVANCE CARE PLAN OR EQUIV PRESENT IN MEDICAL RECORD: ICD-10-PCS | Mod: CPTII,S$GLB,, | Performed by: INTERNAL MEDICINE

## 2023-05-01 PROCEDURE — 3288F PR FALLS RISK ASSESSMENT DOCUMENTED: ICD-10-PCS | Mod: CPTII,S$GLB,, | Performed by: INTERNAL MEDICINE

## 2023-05-01 PROCEDURE — 1101F PR PT FALLS ASSESS DOC 0-1 FALLS W/OUT INJ PAST YR: ICD-10-PCS | Mod: CPTII,S$GLB,, | Performed by: INTERNAL MEDICINE

## 2023-05-01 PROCEDURE — 3078F DIAST BP <80 MM HG: CPT | Mod: CPTII,S$GLB,, | Performed by: INTERNAL MEDICINE

## 2023-05-01 PROCEDURE — 3074F PR MOST RECENT SYSTOLIC BLOOD PRESSURE < 130 MM HG: ICD-10-PCS | Mod: CPTII,S$GLB,, | Performed by: INTERNAL MEDICINE

## 2023-05-01 PROCEDURE — 3288F FALL RISK ASSESSMENT DOCD: CPT | Mod: CPTII,S$GLB,, | Performed by: INTERNAL MEDICINE

## 2023-05-01 PROCEDURE — 1159F MED LIST DOCD IN RCRD: CPT | Mod: CPTII,S$GLB,, | Performed by: INTERNAL MEDICINE

## 2023-05-01 PROCEDURE — 1101F PT FALLS ASSESS-DOCD LE1/YR: CPT | Mod: CPTII,S$GLB,, | Performed by: INTERNAL MEDICINE

## 2023-05-01 PROCEDURE — 99999 PR PBB SHADOW E&M-EST. PATIENT-LVL IV: ICD-10-PCS | Mod: PBBFAC,,, | Performed by: INTERNAL MEDICINE

## 2023-05-01 PROCEDURE — 1126F AMNT PAIN NOTED NONE PRSNT: CPT | Mod: CPTII,S$GLB,, | Performed by: INTERNAL MEDICINE

## 2023-05-01 PROCEDURE — 99214 PR OFFICE/OUTPT VISIT, EST, LEVL IV, 30-39 MIN: ICD-10-PCS | Mod: S$GLB,,, | Performed by: INTERNAL MEDICINE

## 2023-05-01 RX ORDER — TIOTROPIUM BROMIDE AND OLODATEROL 3.124; 2.736 UG/1; UG/1
1 SPRAY, METERED RESPIRATORY (INHALATION) DAILY
Qty: 1 G | Refills: 11 | Status: SHIPPED | OUTPATIENT
Start: 2023-05-01 | End: 2024-01-03 | Stop reason: SDUPTHER

## 2023-05-01 RX ORDER — DULOXETIN HYDROCHLORIDE 30 MG/1
30 CAPSULE, DELAYED RELEASE ORAL DAILY
Qty: 30 CAPSULE | Refills: 1 | Status: SHIPPED | OUTPATIENT
Start: 2023-05-01 | End: 2023-06-16

## 2023-05-01 NOTE — PROGRESS NOTES
Percy Ramirez  was seen as a follow up.        HISTORY OF PRESENT ILLNESS: Percy Ramirez is a 82 y.o. male with pmh of tobacco abuse, cad, tyler, htn, dyslipidemia, dm2 is here for pulmonary evaluation.  Our first encounter was 1/24/13.  At that time, patient with chronic dyspnea.  However, patient stated symptoms have gradually worsen since 2012.  +pelletier x 1/2.  No fever/chills.  No wheezing.  No coughing.  No orthopnea.  No pnd.  Patient with h/o 3 ppd x 40 years.  Quit smoking 2004.  PFT with moderate-severe obstruction with FEV1 55% (6/14/19)    In the past, patient was prescribed spiriva.  However, patient did not filled due to cost.  Currently on albuterol.  Patient underwent LDCT screening 2/11/19 by Dr. Edmondson.  CT demonstrated several ggo.      During last visit, patient was provided prescription for trelegy.  copay is $45.  Patient was getting patient assistant from [employer].  Patient was switched to [employer].  Per patient, patient assistant program for Trelegy was too complicated.  Doing well Stiol[facility].  Have been using albuterol 3 times daily.  No fever/chil.  No chest .  Still with significant dyspnea.  Breathing is better with Stiolto.  No coughing or wheezing.       S/p split study at [facility] 2017 with ahi of 32.  S/p retitration 2019 with effective control of bipap 16/10.  Using bipap nightly without issue.        PAST MEDICAL HISTORY:    Active Ambulatory Problems     Diagnosis Date Noted    Major depressive disorder, recurrent episode, mild     Benign hypertension with chronic kidney disease, stage III     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     Coronary artery disease     S/P CABG x 2     Macular degeneration     Obstructive sleep apnea treated with BiPAP 07/27/2012    Anxiety state, unspecified 10/24/2013    Insulin long-term use 02/16/2015    Primary osteoarthritis of both knees 02/25/2015    Chronic low back pain 02/25/2015    DJD (degenerative joint disease), lumbar 04/30/2015     Poorly controlled type 2 diabetes mellitus with neuropathy 07/29/2016    Bilateral carotid artery disease 07/29/2016    Hyperlipidemia LDL goal <100 07/29/2016    Type 2 diabetes, with peripheral circulatory disorder not at goal 07/29/2016    COPD with chronic bronchitis and emphysema 07/29/2016    Atherosclerosis of abdominal aorta 07/29/2016    Overweight (BMI 25.0-29.9) 07/29/2016    Persistent atrial fibrillation 03/06/2017    Chronic stable angina 03/06/2017    Senile purpura 03/06/2017    Goals of care, counseling/discussion 10/04/2017    Osteoarthritis of carpometacarpal (CMC) joint of left thumb 11/22/2017    MCI (mild cognitive impairment) 06/18/2018    Chronic vertigo 02/11/2019    Pulmonary nodule 03/20/2019    Dyspnea on exertion 03/20/2019    Chronic combined systolic and diastolic heart failure 11/30/2018    Ambulates with cane 11/06/2019    ILD (interstitial lung disease) 05/12/2020    History of cardioversion 09/23/2020    Erectile dysfunction 01/24/2020    Secondary hyperparathyroidism of renal origin 11/06/2020    Noncompliance with medication regimen 11/06/2020    Exudative age-related macular degeneration, right eye, with active choroidal neovascularization 03/29/2022    History of stroke 04/04/2022    Chronic tension-type headache, intractable 04/08/2022    Spinal stenosis of lumbar region with neurogenic claudication 02/24/2023    Lumbar radiculopathy 02/24/2023    Lumbar spondylosis 02/24/2023    DDD (degenerative disc disease), lumbar 02/24/2023     Resolved Ambulatory Problems     Diagnosis Date Noted    Combined hyperlipidemia associated with type 2 diabetes mellitus     Fatigue 07/27/2012    Depressive disorder, not elsewhere classified 10/24/2013    Arrhythmia 12/30/2013    Dizziness of unknown cause 12/30/2013    Hypotension 06/13/2014    Hyponatremia 06/14/2014    Diabetic retinopathy of both eyes 10/23/2014    Diabetic neuropathy 02/25/2015    Poor motor control of trunk 03/11/2015     IT band syndrome 2015    Impairment of balance 2015    Uncontrolled type 2 diabetes mellitus with hyperglycemia 2016    Medication intolerance 2019    Drug-induced immunodeficiency 08/10/2022     Past Medical History:   Diagnosis Date    Allergy     Arthritis     CAD, multiple vessel     Cataract     Depression     History of colon polyps     Hyperlipidemia     Hypertension     Type 2 diabetes mellitus with circulatory disorder                 PAST SURGICAL HISTORY:    Past Surgical History:   Procedure Laterality Date    broken elbow      left    COLONOSCOPY N/A 10/4/2017    Procedure: COLONOSCOPY;  Surgeon: Gabriel Leung MD;  Location: [facility];  Service: Endoscopy;  Laterality: N/A;    EYE SURGERY      cataract    heart bypass      2004    HERNIA REPAIR      right    ROTATOR CUFF REPAIR      right  2004         FAMILY HISTORY:                Family History   Problem Relation Age of Onset    Arthritis Mother     Diabetes Mother     Stroke Mother     Heart attack Father     Cancer Brother     Throat cancer Brother     Diabetes Maternal Aunt     Diabetes Maternal Uncle     Heart disease Maternal Uncle     Diabetes Paternal Aunt     Heart disease Paternal Aunt     Heart disease Paternal Uncle     Heart disease Maternal Grandmother     Cancer Maternal Grandfather        SOCIAL HISTORY:          Tobacco:   Social History     Tobacco Use   Smoking Status Former    Packs/day: 3.00    Years: 45.00    Pack years: 135.00    Types: Cigarettes    Quit date: 2004    Years since quittin.0   Smokeless Tobacco Former       alcohol use:    Social History     Substance and Sexual Activity   Alcohol Use Yes    Comment: was heavy drinker 35 years ago, rare use now                 Occupation:  Retired     ALLERGIES:    Review of patient's allergies indicates:   Allergen Reactions    Aricept odt [donepezil] Diarrhea and Nausea And Vomiting    Codeine Nausea Only     weak    Ranexa  "[ranolazine]        CURRENT MEDICATIONS:    Current Outpatient Medications   Medication Sig Dispense Refill    acetaminophen (TYLENOL) 325 MG tablet Take 2 tablets (650 mg total) by mouth every 6 (six) hours as needed. 13 tablet 0    albuterol (PROVENTIL/VENTOLIN HFA) 90 mcg/actuation inhaler INHALE 2 PUFFS BY MOUTH EVERY 6 HOURS AS NEEDED FOR WHEEZING OR  SHORTNESS  OF  BREATH 54 g 0    atorvastatin (LIPITOR) 40 MG tablet Take 40 mg by mouth once daily.      BD INSULIN PEN NEEDLE UF SHORT 31 gauge x 5/16" Ndle       BD INSULIN SYRINGE ULTRA-FINE 1/2 mL 31 gauge x 15/64" Syrg       blood sugar diagnostic Strp To check BG 3 times daily, to use with insurance preferred meter 200 strip 11    carvedilol (COREG) 25 MG tablet Take 1 tablet (25 mg total) by mouth 2 (two) times daily. 180 tablet 0    ceramides 1,3,6-II (CERAVE DAILY MOISTURIZING) Lotn Apply twice daily to skin 355 mL 1    cinnamon bark 500 mg capsule Take 1,000 mg by mouth once daily.        clopidogreL (PLAVIX) 75 mg tablet Take 75 mg by mouth.      diclofenac sodium (VOLTAREN) 1 % Gel Apply 2 g topically 2 (two) times daily. 100 g 0    diclofenac sodium (VOLTAREN) 1 % Gel Apply 2 g topically 3 (three) times daily. 50 g 0    ENTRESTO  mg per tablet Take 1 tablet by mouth 2 (two) times daily.      fluticasone propionate (FLONASE) 50 mcg/actuation nasal spray 1 spray (50 mcg total) by Each Nostril route 2 (two) times daily. 18.2 mL 3    furosemide (LASIX) 40 MG tablet Take 40 mg by mouth once daily.      gabapentin (NEURONTIN) 300 MG capsule Take 4 capsules (1,200 mg total) by mouth 2 (two) times daily. (Patient taking differently: Take 900 mg by mouth 2 (two) times daily.) 540 capsule 1    glimepiride (AMARYL) 4 MG tablet Take 4 mg by mouth daily with breakfast.       HYDROcodone-acetaminophen (NORCO) 5-325 mg per tablet Take 1 tablet by mouth every 8 (eight) hours as needed for Pain. 60 tablet 0    [START ON 5/23/2023] HYDROcodone-acetaminophen " "(NORCO) 5-325 mg per tablet Take 1 tablet by mouth every 8 (eight) hours as needed for Pain. 60 tablet 0    hydrOXYzine HCL (ATARAX) 25 MG tablet Take 1 tablet (25 mg total) by mouth 3 (three) times daily as needed for Itching or Anxiety. 90 tablet 3    insulin NPH-insulin regular, 70/30, (NOVOLIN 70/30) 100 unit/mL (70-30) injection Inject into the skin. Inject 10 units at breakfast and inject 10 units at night      insulin syringe-needle U-100 0.3 mL 31 gauge x 15/64" Syrg USE TO INJECT INSULIN THREE TIMES DAILY      insulin syringe-needle U-100 1/2 mL 31 x 5/16" Syrg       ipratropium (ATROVENT) 42 mcg (0.06 %) nasal spray 2 sprays by Nasal route every 6 (six) hours as needed for Rhinitis (use as needed for runny nose and also use 15 minutes prior to meal). Clean nose with saline prior to use 15 mL 3    isosorbide mononitrate (IMDUR) 30 MG 24 hr tablet Take 30 mg by mouth once daily.      lancets Misc To check BG 3 times daily, to use with insurance preferred meter 200 each 11    LIDOcaine (LIDODERM) 5 % Place 1 patch onto the skin once daily. Remove & Discard patch within 12 hours or as directed by MD 5 patch 1    metformin (GLUCOPHAGE) 500 MG tablet Take 1,000 mg by mouth 2 (two) times daily with meals. 2 Tablets in the morning, 2 Tablets in the evening      methocarbamoL (ROBAXIN) 500 MG Tab TAKE 1 TABLET BY MOUTH 4 TIMES DAILY FOR 10 DAYS 60 tablet 0    metoclopramide HCl (REGLAN) 5 MG tablet Take 5 mg by mouth 3 (three) times daily as needed.      nitroGLYCERIN (NITROSTAT) 0.4 MG SL tablet DISSOLVE 1 TABLET UNDER THE TONGUE EVERY 5 MINUTES AS  NEEDED FOR CHEST PAIN. MAX  OF 3 TABLETS IN 15 MINUTES. CALL 911 IF PAIN PERSISTS.      pravastatin (PRAVACHOL) 20 MG tablet Take 1 tablet (20 mg total) by mouth once daily. 90 tablet 1    spironolactone (ALDACTONE) 25 MG tablet Take 25 mg by mouth.      triamcinolone acetonide 0.1% (KENALOG) 0.1 % cream SMARTSI Application Topical 2-3 Times Daily      valsartan " "(DIOVAN) 80 MG tablet Take 80 mg by mouth.      VIT C/VIT E AC/LUT/COPPER/ZINC (PRESERVISION LUTEIN ORAL) Take by mouth.      warfarin (COUMADIN) 5 MG tablet Take 2 tabs by mouth on Tuesday,Thursday, Saturday and Sundays then 1.5 on all other days.      blood-glucose meter kit To check BG 3 times daily, to use with insurance preferred meter 1 each 0    DULoxetine (CYMBALTA) 30 MG capsule Take 1 capsule (30 mg total) by mouth once daily. 30 capsule 1    tiotropium-olodateroL (STIOLTO RESPIMAT) 2.5-2.5 mcg/actuation Mist Inhale 1 puff into the lungs once daily. Controller 1 g 11     No current facility-administered medications for this visit.                  REVIEW OF SYSTEMS:     Pulmonary related symptoms as per HPI.  Gen:  no weight loss, no fever, no night sweat  HEENT:  + visual disturbance from macular degenration, no sore throat, + hearing acuity  CV:  No chest pain, no orthopnea, no PND  GI:  no melena, no hematochezia, no diarhea, no constipation.  Hernia pain left groin.  :  no dysuria, no hematuria, no hesistancy, no dribbling  Neuro:  no syncope, no vertigo, no tinitus  Psych:  No homocide or suicide ideation; no depression.  Endocrine:  No heat or cold intolerance.  Sleep:  No snoring; no witnessed apnea.  Otherwise, a balance of systems reviewed is negative.                 PHYSICAL EXAM:  Vitals:    05/01/23 0758   BP: (!) 94/52   Pulse: 79   SpO2: (!) 86%   Weight: 97 kg (213 lb 15.3 oz)   Height: 5' 8" (1.727 m)   PainSc: 0-No pain     Body mass index is 32.53 kg/m².     Physical Exam:   General: NAD, cooperative & interactive.  HEENT: AT/NC, PERRL, EOMI, nasal and oral mucosa moist. Normal dentition. Oropharynx without exudate.   Neck: Supple without JVD. Trachea midline. Thyroid feels normal.   Cardiac: S1S2 with brisk cap refill and symmetric pulses in distal extremities.  Respiratory: Normal inspection. Symmetric chest rise. Normal palpation and percussion. Auscultation clear bilaterally. No " increased work of breathing noted.   Abdomen: Soft, NT/ND. +BS. No palpable masses. No hepatosplenomegaly.   Lymphatic: No palpable supraclavicular or cervical LAD.   Extremities: Normal strength and tone (grossly). No visible atrophy. No clubbing or cyanosis on nail exam.   Skin: Warm and dry without visible rash.   Neuro: Grossly intact to brief exam. Oriented with appropriate mood & affect.       LABS  Pulmonary Functions Testing Results:    1/31/13 tlc 93%; dlco 46%  4/8/14 ratio 67%; fvc 76%; fve 73%  6/14/19 ratio of 48%; fvc 85%; fev1 55%  PFT 5/15/20 ratio of 54%; fev1 1.6 L (57%); fvc 2.97 L ( 80%); tlc.82 L( 77%); dlco 8.23 (34%)  PFT 8/29/22 Ratio of 48%; FVC 2.96 L (81%); FEV1 1.42 L (52%); TLC 4.92 L (79%); dlco 7.57 (32%)     6 min 5/15/20 165 96-89-91 on room     ABG: none  CXR:   Lungs clear  CT CHEST:  2/15/19 rll ggo and tib in lingula.    5/4/19 (personally reviewed) increased reticulation at bases; similar to prior ct 2/15/19; +emphysematous changes.      PPD none          Split night study from  3/21/2017: AHI 31.7, Effective control acieved with cpap 14 cm H2O  CPAP titration study 8/17/2019:  Effective control of sleep disordered breathing was achieved with BPAP at 16/10 cm of water.     2-d echo 12/8/20  TDS     Increased left ventricular wall thickness, decreased left ventricular systolic function.     Left ventricular ejection fraction is estimated at 30-40 %.  Recommend MUGA     Indeterminate diastolic function.     Normal right ventricular size, decreased right ventricular systolic function.     Right ventricular systolic pressure 69.7 mmHg.  Severe pulmonary hypertension.     Mildly increased left atrial size.     Mitral annular calcification.  Mild mitral valve regurgitation.     Aortic valve sclerosis.     bnp 8/5/10 134;1/24/13 164    6 min walk 350 meters; no significant desaturation    ASSESSMENT  Problem List Items Addressed This Visit       Chronic combined systolic and  diastolic heart failure    Overview     -EF 30-40%  -entresto and follow by Dr. Valderrama.             COPD with chronic bronchitis and emphysema    Overview     - Quit smoking since 1960s.   -fev1 52%  -Patient is up to date with vaccination.    -Declines pulmonary rehab  -On home oxygen which helps  -continue with Stiolto  -walker and wheelchair dependant.            Relevant Medications    tiotropium-olodateroL (STIOLTO RESPIMAT) 2.5-2.5 mcg/actuation Mist    Goals of care, counseling/discussion    Overview     Advance Care Planning   During this visit, I engaged the patient in the advance care planning process.  The patient and I reviewed the role for advance directives and their purpose in directing future healthcare if the patient's unable to speak for him/herself.  At this point in time, the patient does have full decision-making capacity.  We discussed different extreme health states that patient could experience, and reviewed what kind of medical care patient would want in those situations.  The patient communicated that if he was comatose and had little chance of a meaningful recovery, he would NOT want machines/life-sustaining treatments used.  In addition to the above preference, she/he patient  HAS completed a living will to reflect these preferences.  The patient HAS already designated, Kalyani Felder, as healthcare power of  to make decisions on her behalf.  In the case of cardiopulmonary arrest, patient does wish for CPR, intubation or cardioversion.            ILD (interstitial lung disease)    Overview     -Emphysematous changes on ct along with basilar reticulation.  pft with moderate obstructive physiology and mild restrictive physiology.  dlco significantly reduced (pt on home oxygen)  -Monitor lung function stable fvc           Obstructive sleep apnea treated with BiPAP    Overview     -ahi of 31.7.  Patient is using and benefiting from bipap 16/10.              Relevant Orders     CPAP/BIPAP SUPPLIES              Patient will No follow-ups on file.       25 minutes of total time spent on the encounter, which includes face to face time and non-face to face time preparing to see the patient (eg, review of tests), Obtaining and/or reviewing separately obtained history, documenting clinical information in the electronic or other health record, independently interpreting results (not separately reported) and communicating results to the patient/family/caregiver, or Care coordination (not separately reported).

## 2023-05-01 NOTE — TELEPHONE ENCOUNTER
"----- Message from Donna Gillis sent at 5/1/2023  1:01 PM CDT -----  Regarding: Refill Request  Type: RX Refill Request      Who Called: Self       Refill or New Rx: refill      RX Name and Strength: DULoxetine (CYMBALTA) 30 MG capsule        Preferred Pharmacy with phone number:  .  [id number]  [address] [address] - [address] [address] [facility]  [place] [address]  Phone: [phone] Fax: [phone]        Would the patient rather a call back or a response via [facility]? Call       Best Call Back Number: .[phone]          Additional Information:  "He said that his nerves are going crazy"       "

## 2023-05-02 RX ORDER — DULOXETIN HYDROCHLORIDE 30 MG/1
30 CAPSULE, DELAYED RELEASE ORAL DAILY
Qty: 30 CAPSULE | Refills: 1 | OUTPATIENT
Start: 2023-05-02 | End: 2023-06-01

## 2023-05-03 LAB
LEFT EYE DM RETINOPATHY: POSITIVE
RIGHT EYE DM RETINOPATHY: POSITIVE

## 2023-05-19 ENCOUNTER — TELEPHONE (OUTPATIENT)
Dept: PSYCHIATRY | Facility: CLINIC | Age: 83
End: 2023-05-19
Payer: MEDICARE

## 2023-05-19 NOTE — TELEPHONE ENCOUNTER
"Called and spoke to pt. Mad ept aware he has a refill still for rx and will need to call pharmacy to fill. Pt then stated he spoke to [facility] and they stated they do not have it and he needed to all our office.    Called and spoke to meghan. They informed me that pt picked up 1 fill on 5/1/23, however requested the remaining refill to be [facility].       [facility]ed and [facility]. They stated they are processing the fill and it will be mailed out to pt on 5/22.    Called pt and notified. Pt verbally acknowledged, also stated he tried going a weekend without Cymbalta and noticed himself to be very irritable.     Pt stated Mary wants to wean him off, but pt stated he notices it helps him. Advise pt to speak to provider at next f/u. Of note, pt also stated he wanted Valium as its the only thing that helps with his "dizziness." Made pt aware m[facility] does not rx valium.    No further concerns voiced.   Pt verbally acknowledged.     ----- Message from Patricia Edwards MA sent at 5/19/2023  3:10 PM CDT -----  Type: RX Refill Request    Who Called: Self    Have you contacted your pharmacy:no    Refill or New Rx:refill     RX Name and Strength:DULoxetine (CYMBALTA) 30 MG capsule      Preferred Pharmacy with phone number:[facility] - [address] [address] [address] [facility]   Phone:  [phone]  Fax:  [phone]          Local or Mail Order:Mail order     Ordering Provider:JOYCE Parada    Would the patient rather a call back or a response via [facility]? yes    Best Call Back Number:[phone]         "

## 2023-05-22 ENCOUNTER — TELEPHONE (OUTPATIENT)
Dept: PAIN MEDICINE | Facility: CLINIC | Age: 83
End: 2023-05-22
Payer: MEDICARE

## 2023-05-22 NOTE — TELEPHONE ENCOUNTER
Spoke to [facility]'s pharmacy and they confirmed that Mr. Ramirez does have a refill for Norco on file.  Contacted pt to inform him.

## 2023-05-22 NOTE — TELEPHONE ENCOUNTER
----- Message from Graham Alba sent at 5/22/2023  8:40 AM CDT -----  Regarding: Self [phone]  Type: Patient Call Back    Who called: Self     What is the request in detail: called in regards to  HYDROcodone-acetaminophen (NORCO) 5-325 mg per tablet, stating he doesn't have enough medication for the refill for tomorrow, would like to see if it can be filled today instead of tomorrow. Would like a call.    [id number] - [place] - [address] [address] [facility]  [place] [address]  Phone: [phone] Fax: [phone]    Can the clinic reply by [facility]? No     Would the patient rather a call back or a response via [facility]? Call back     Best call back number: [phone]     Additional Information:    Thank you.

## 2023-05-24 ENCOUNTER — TELEPHONE (OUTPATIENT)
Dept: FAMILY MEDICINE | Facility: CLINIC | Age: 83
End: 2023-05-24
Payer: MEDICARE

## 2023-05-24 DIAGNOSIS — Z23 NEED FOR TETANUS BOOSTER: Primary | ICD-10-CM

## 2023-05-24 NOTE — TELEPHONE ENCOUNTER
----- Message from Christina Holland sent at 5/24/2023  3:47 PM CDT -----  Regarding: [phone]  Type: Patient Call Back    Who called:self    What is the request in detail: pt states he needs to know when, if and what day he needs to get his tetanus shot     Can the clinic reply by [facility]?no    Would the patient rather a call back or a response via [facility]? Call back     Best call back number:[phone]      Additional Information:pt states he needs to speak with a nurse

## 2023-05-24 NOTE — TELEPHONE ENCOUNTER
Informed patient per our records last tetanus 2013. Informed patient he will need to go to the pharmacy for his tetanus shot

## 2023-05-26 ENCOUNTER — TELEPHONE (OUTPATIENT)
Dept: FAMILY MEDICINE | Facility: CLINIC | Age: 83
End: 2023-05-26
Payer: MEDICARE

## 2023-05-26 NOTE — TELEPHONE ENCOUNTER
----- Message from Patricia Edwards MA sent at 5/26/2023  1:43 PM CDT -----  Type: Patient Call Back    Who called: Self    What is the request in detail: pt. Is asking for the nurse to give him a call in reference to a tetanus shot ..     Can the clinic reply by [facility]?NO    Would the patient rather a call back or a response via [facility]? yes    Best call back number: [phone] (home)

## 2023-05-26 NOTE — TELEPHONE ENCOUNTER
----- Message from Anastasia Gallagher sent at 5/26/2023 12:33 PM CDT -----  Regarding: Returning Call  .Type:  Patient Returning Call    Who Called: Self     Who Left Message for Patient: Requesting a call back from nurse or provider regarding a Tetanus  shot he wants to have. Wants to have it done at [facility] or the [facility]. Please advise.    Does the patient know what this is regarding?: No    Would the patient rather a call back or a response via [facility]? Call back    Best Call Back Number: [phone]     Additional Information:

## 2023-05-29 ENCOUNTER — PES CALL (OUTPATIENT)
Dept: ADMINISTRATIVE | Facility: CLINIC | Age: 83
End: 2023-05-29
Payer: MEDICARE

## 2023-05-30 ENCOUNTER — PATIENT OUTREACH (OUTPATIENT)
Dept: ADMINISTRATIVE | Facility: HOSPITAL | Age: 83
End: 2023-05-30
Payer: MEDICARE

## 2023-06-01 ENCOUNTER — TELEPHONE (OUTPATIENT)
Dept: NEUROLOGY | Facility: CLINIC | Age: 83
End: 2023-06-01
Payer: MEDICARE

## 2023-06-01 DIAGNOSIS — H81.392 PERIPHERAL VERTIGO INVOLVING LEFT EAR: ICD-10-CM

## 2023-06-01 NOTE — TELEPHONE ENCOUNTER
----- Message from Marina Nichole sent at 6/1/2023 11:49 AM CDT -----  Type: RX Refill Request    Who Called: Self     Have you contacted your pharmacy:Yes     Refill or New Rx:Refill     RX Name and Strength: Meclizine (dizziness medication)     Preferred Pharmacy with phone number:[facility] - [address] [place] [address] [address] [facility]   Phone: [phone]  Fax:  [phone]    Local or Mail Order:Mail Order     Would the patient rather a call back or a response via [facility]? CALL     Best Call Back Number: [phone] (home)

## 2023-06-01 NOTE — TELEPHONE ENCOUNTER
----- Message from Shannan Mae sent at 6/1/2023  3:20 PM CDT -----  Regarding: self .[phone]  .Type: RX Refill Request    Who Called: self     Have you contacted your pharmacy: no    Refill or New Rx: refill     RX Name and Strength: Meclizine 25 mg     Preferred Pharmacy with phone number: .    [facility] - [address] [address]  Phone: [phone] Fax: [phone]    Local or Mail Order: local     Would the patient rather a call back or a response via [facility]? Call back     Best Call Back Number: .[phone]

## 2023-06-01 NOTE — TELEPHONE ENCOUNTER
Please clarify with patient: he has a medication called Reglan on his medication list. Is he currently taking this?

## 2023-06-01 NOTE — TELEPHONE ENCOUNTER
No care due was identified.  [facility] Embedded Care Due Messages. Reference number: [id number].   6/01/2023 2:29:47 PM CDT

## 2023-06-01 NOTE — TELEPHONE ENCOUNTER
I do not see a discontinuation reason so I suspect if just fell of his list. Please send as refill request form medication hx of last refill - thanks!

## 2023-06-02 RX ORDER — MECLIZINE HCL 12.5 MG 12.5 MG/1
12.5 TABLET ORAL 3 TIMES DAILY PRN
Qty: 90 TABLET | Refills: 0 | Status: SHIPPED | OUTPATIENT
Start: 2023-06-02 | End: 2023-08-31 | Stop reason: SDUPTHER

## 2023-06-02 NOTE — TELEPHONE ENCOUNTER
Spoke with patient. Patient states he is not taking reglan and if he was it was making him sick so he stopped. Patient states his vertigo is way off and almost fell yesterday due to not having the medication. That is why he is requesting the Meclizine 25 mg.

## 2023-06-15 ENCOUNTER — LAB VISIT (OUTPATIENT)
Dept: LAB | Facility: HOSPITAL | Age: 83
End: 2023-06-15
Payer: MEDICARE

## 2023-06-15 ENCOUNTER — OFFICE VISIT (OUTPATIENT)
Dept: PAIN MEDICINE | Facility: CLINIC | Age: 83
End: 2023-06-15
Payer: MEDICARE

## 2023-06-15 VITALS
OXYGEN SATURATION: 95 % | DIASTOLIC BLOOD PRESSURE: 58 MMHG | SYSTOLIC BLOOD PRESSURE: 121 MMHG | RESPIRATION RATE: 18 BRPM | HEART RATE: 72 BPM

## 2023-06-15 DIAGNOSIS — M51.36 DDD (DEGENERATIVE DISC DISEASE), LUMBAR: Primary | ICD-10-CM

## 2023-06-15 DIAGNOSIS — G89.29 CHRONIC RIGHT-SIDED LOW BACK PAIN WITH RIGHT-SIDED SCIATICA: ICD-10-CM

## 2023-06-15 DIAGNOSIS — M54.16 LUMBAR RADICULOPATHY: ICD-10-CM

## 2023-06-15 DIAGNOSIS — M54.41 CHRONIC RIGHT-SIDED LOW BACK PAIN WITH RIGHT-SIDED SCIATICA: ICD-10-CM

## 2023-06-15 DIAGNOSIS — M47.816 LUMBAR SPONDYLOSIS: ICD-10-CM

## 2023-06-15 DIAGNOSIS — M48.062 SPINAL STENOSIS OF LUMBAR REGION WITH NEUROGENIC CLAUDICATION: ICD-10-CM

## 2023-06-15 DIAGNOSIS — M51.36 DDD (DEGENERATIVE DISC DISEASE), LUMBAR: ICD-10-CM

## 2023-06-15 PROCEDURE — 99214 OFFICE O/P EST MOD 30 MIN: CPT | Mod: S$GLB,,,

## 2023-06-15 PROCEDURE — 1159F MED LIST DOCD IN RCRD: CPT | Mod: CPTII,S$GLB,,

## 2023-06-15 PROCEDURE — 1101F PT FALLS ASSESS-DOCD LE1/YR: CPT | Mod: CPTII,S$GLB,,

## 2023-06-15 PROCEDURE — 99214 PR OFFICE/OUTPT VISIT, EST, LEVL IV, 30-39 MIN: ICD-10-PCS | Mod: S$GLB,,,

## 2023-06-15 PROCEDURE — 3074F PR MOST RECENT SYSTOLIC BLOOD PRESSURE < 130 MM HG: ICD-10-PCS | Mod: CPTII,S$GLB,,

## 2023-06-15 PROCEDURE — 3078F PR MOST RECENT DIASTOLIC BLOOD PRESSURE < 80 MM HG: ICD-10-PCS | Mod: CPTII,S$GLB,,

## 2023-06-15 PROCEDURE — 3288F PR FALLS RISK ASSESSMENT DOCUMENTED: ICD-10-PCS | Mod: CPTII,S$GLB,,

## 2023-06-15 PROCEDURE — 36415 COLL VENOUS BLD VENIPUNCTURE: CPT | Mod: PN

## 2023-06-15 PROCEDURE — 1157F ADVNC CARE PLAN IN RCRD: CPT | Mod: CPTII,S$GLB,,

## 2023-06-15 PROCEDURE — 1160F PR REVIEW ALL MEDS BY PRESCRIBER/CLIN PHARMACIST DOCUMENTED: ICD-10-PCS | Mod: CPTII,S$GLB,,

## 2023-06-15 PROCEDURE — 99999 PR PBB SHADOW E&M-EST. PATIENT-LVL V: ICD-10-PCS | Mod: PBBFAC,,,

## 2023-06-15 PROCEDURE — 99999 PR PBB SHADOW E&M-EST. PATIENT-LVL V: CPT | Mod: PBBFAC,,,

## 2023-06-15 PROCEDURE — 1157F PR ADVANCE CARE PLAN OR EQUIV PRESENT IN MEDICAL RECORD: ICD-10-PCS | Mod: CPTII,S$GLB,,

## 2023-06-15 PROCEDURE — 1160F RVW MEDS BY RX/DR IN RCRD: CPT | Mod: CPTII,S$GLB,,

## 2023-06-15 PROCEDURE — 3078F DIAST BP <80 MM HG: CPT | Mod: CPTII,S$GLB,,

## 2023-06-15 PROCEDURE — 3074F SYST BP LT 130 MM HG: CPT | Mod: CPTII,S$GLB,,

## 2023-06-15 PROCEDURE — 1101F PR PT FALLS ASSESS DOC 0-1 FALLS W/OUT INJ PAST YR: ICD-10-PCS | Mod: CPTII,S$GLB,,

## 2023-06-15 PROCEDURE — 80307 DRUG TEST PRSMV CHEM ANLYZR: CPT

## 2023-06-15 PROCEDURE — 1125F PR PAIN SEVERITY QUANTIFIED, PAIN PRESENT: ICD-10-PCS | Mod: CPTII,S$GLB,,

## 2023-06-15 PROCEDURE — 3288F FALL RISK ASSESSMENT DOCD: CPT | Mod: CPTII,S$GLB,,

## 2023-06-15 PROCEDURE — 1159F PR MEDICATION LIST DOCUMENTED IN MEDICAL RECORD: ICD-10-PCS | Mod: CPTII,S$GLB,,

## 2023-06-15 PROCEDURE — 1125F AMNT PAIN NOTED PAIN PRSNT: CPT | Mod: CPTII,S$GLB,,

## 2023-06-15 RX ORDER — HYDROCODONE BITARTRATE AND ACETAMINOPHEN 5; 325 MG/1; MG/1
1 TABLET ORAL EVERY 8 HOURS PRN
Qty: 60 TABLET | Refills: 0 | Status: SHIPPED | OUTPATIENT
Start: 2023-07-22 | End: 2023-08-18 | Stop reason: SDUPTHER

## 2023-06-15 RX ORDER — HYDROCODONE BITARTRATE AND ACETAMINOPHEN 5; 325 MG/1; MG/1
1 TABLET ORAL EVERY 8 HOURS PRN
Qty: 60 TABLET | Refills: 0 | Status: SHIPPED | OUTPATIENT
Start: 2023-08-21 | End: 2023-08-18 | Stop reason: SDUPTHER

## 2023-06-15 RX ORDER — HYDROCODONE BITARTRATE AND ACETAMINOPHEN 5; 325 MG/1; MG/1
1 TABLET ORAL EVERY 8 HOURS PRN
Qty: 60 TABLET | Refills: 0 | Status: SHIPPED | OUTPATIENT
Start: 2023-06-22 | End: 2023-07-22

## 2023-06-15 NOTE — PROGRESS NOTES
Subjective:     Patient ID: Percy Ramirez is a 82 y.o. male    Chief Complaint: Follow-up (3mo med mgmt )      Referred by: No ref. provider found      HPI:    Interval History PA (06/15/2023):  Patient returns to clinic for follow up and medication refill.  She denies any changes in the quality or location his pain since previous visit.  Denies any new or worsening symptoms.  Continues to take Norco 5-325 mg q.8 hours p.r.n. #60. with adequate relief, denies any adverse effects from this medication.  Continues to use with the pills in half and will occasionally take a full pill for episodes of increased pain.  Approximately 2 pills per day.    Interval History PA (03/24/2023):  Patient returns to clinic for follow up of chronic lower back and extremity pain and medication refill.  Patient denies any changes in the quality or location of his pain since previous visit.  Denies any new or worsening symptoms.  Patient was recently started on Decherd 5-325 Q 8 hours p.r.n..  He reports typically spitting the pills in half and occasionally taking a full pill with increased pain.  Notes taking approximately 2 pill total per day.  Notes adequate relief from this medication, denies any adverse effects.    Initial Encounter (2/24/23):  Percy Ramirez is a 82 y.o. male who presents today with chronic low back and lower extremity pain.  This pain has been present for years.  No specific inciting events or injuries noted.  Pain has been worsening over time.  Pain is constant but significantly worsened with standing or walking.  Can only stand or walk for short periods of time before needing to rest.  Pain is located in the midline lower lumbar spine and will radiate into the bilateral lumbar paraspinal regions and hips.  Denies any persistent numbness, tingling, weakness, bowel bladder dysfunction.  Does report that his legs feel weak after standing or walking for prolonged periods of time.  Pain improves upon sitting but  still has pain when sitting.   This pain is described in detail below.    Physical Therapy:  No.    Non-pharmacologic Treatment:  Rest helps         TENS?  No    Pain Medications:         Currently taking:  Tylenol, gabapentin, Robaxin, Cymbalta, Norco 5-325 mg Q 8 hours prn    Has tried in the past:      Has not tried:  NSAIDs, TCAs, topical creams    Blood thinners:  Coumadin    Interventional Therapies:  None    Relevant Surgeries:  None    Affecting sleep?  Yes    Affecting daily activities? yes    Depressive symptoms? no          SI/HI? No    Work status: Retired    Pain Scores:    Best:       5/10  Worst:     10/10  Usually:   10/10  Today:    9/10         Review of Systems   Constitutional:  Negative for activity change, appetite change, chills, fatigue, fever and unexpected weight change.   HENT:  Negative for hearing loss.    Eyes:  Negative for visual disturbance.   Respiratory:  Negative for chest tightness and shortness of breath.    Cardiovascular:  Negative for chest pain.   Gastrointestinal:  Negative for abdominal pain, constipation, diarrhea, nausea and vomiting.   Genitourinary:  Negative for difficulty urinating.   Musculoskeletal:  Positive for arthralgias, back pain, gait problem and myalgias. Negative for neck pain.   Skin:  Negative for rash.   Neurological:  Negative for dizziness, weakness, light-headedness, numbness and headaches.   Psychiatric/Behavioral:  Positive for sleep disturbance. Negative for hallucinations and suicidal ideas. The patient is not nervous/anxious.      Past Medical History:   Diagnosis Date    Allergy     Arthritis     CAD, multiple vessel     DR Melissa    Cataract     Depression     History of colon polyps     Hyperlipidemia     Hypertension     Macular degeneration     Dr Razo for injection, Jennifer for eye    S/P CABG x 2     Type 2 diabetes mellitus with circulatory disorder     Dr. Aquino       Past Surgical History:   Procedure Laterality Date    broken  "elbow      left    COLONOSCOPY N/A 10/4/2017    Procedure: COLONOSCOPY;  Surgeon: Gabriel Leung MD;  Location: [facility] ENDO;  Service: Endoscopy;  Laterality: N/A;    EYE SURGERY      cataract    heart bypass      2004    HERNIA REPAIR      right    ROTATOR CUFF REPAIR      right  2004       Social History     Socioeconomic History    Marital status:     Number of children: 4    Years of education: GED   Occupational History    Occupation: retired tug boat pilot   Tobacco Use    Smoking status: Former     Packs/day: 3.00     Years: 45.00     Pack years: 135.00     Types: Cigarettes     Quit date: 2004     Years since quittin.1    Smokeless tobacco: Former   Substance and Sexual Activity    Alcohol use: Yes     Comment: was heavy drinker 35 years ago, rare use now    Drug use: No    Sexual activity: Yes     Partners: Female       Review of patient's allergies indicates:   Allergen Reactions    Aricept odt [donepezil] Diarrhea and Nausea And Vomiting    Codeine Nausea Only     weak    Ranexa [ranolazine]        Current Outpatient Medications on File Prior to Visit   Medication Sig Dispense Refill    acetaminophen (TYLENOL) 325 MG tablet Take 2 tablets (650 mg total) by mouth every 6 (six) hours as needed. 13 tablet 0    albuterol (PROVENTIL/VENTOLIN HFA) 90 mcg/actuation inhaler INHALE 2 PUFFS BY MOUTH EVERY 6 HOURS AS NEEDED FOR WHEEZING OR  SHORTNESS  OF  BREATH 54 g 0    atorvastatin (LIPITOR) 40 MG tablet Take 40 mg by mouth once daily.      BD INSULIN PEN NEEDLE UF SHORT 31 gauge x 5/16" Ndle       BD INSULIN SYRINGE ULTRA-FINE 1/2 mL 31 gauge x 15/64" Syrg       blood sugar diagnostic Strp To check BG 3 times daily, to use with insurance preferred meter 200 strip 11    carvedilol (COREG) 25 MG tablet Take 1 tablet (25 mg total) by mouth 2 (two) times daily. 180 tablet 0    ceramides 1,3,6-II (CERAVE DAILY MOISTURIZING) Lotn Apply twice daily to skin 355 mL 1    cinnamon bark 500 mg capsule " "Take 1,000 mg by mouth once daily.        clopidogreL (PLAVIX) 75 mg tablet Take 75 mg by mouth.      diclofenac sodium (VOLTAREN) 1 % Gel Apply 2 g topically 2 (two) times daily. 100 g 0    diclofenac sodium (VOLTAREN) 1 % Gel Apply 2 g topically 3 (three) times daily. 50 g 0    ENTRESTO  mg per tablet Take 1 tablet by mouth 2 (two) times daily.      fluticasone propionate (FLONASE) 50 mcg/actuation nasal spray 1 spray (50 mcg total) by Each Nostril route 2 (two) times daily. 18.2 mL 3    furosemide (LASIX) 40 MG tablet Take 40 mg by mouth once daily.      gabapentin (NEURONTIN) 300 MG capsule Take 4 capsules (1,200 mg total) by mouth 2 (two) times daily. (Patient taking differently: Take 900 mg by mouth 2 (two) times daily.) 540 capsule 1    glimepiride (AMARYL) 4 MG tablet Take 4 mg by mouth daily with breakfast.       HYDROcodone-acetaminophen (NORCO) 5-325 mg per tablet Take 1 tablet by mouth every 8 (eight) hours as needed for Pain. 60 tablet 0    hydrOXYzine HCL (ATARAX) 25 MG tablet Take 1 tablet (25 mg total) by mouth 3 (three) times daily as needed for Itching or Anxiety. 90 tablet 3    insulin NPH-insulin regular, 70/30, (NOVOLIN 70/30) 100 unit/mL (70-30) injection Inject into the skin. Inject 10 units at breakfast and inject 10 units at night      insulin syringe-needle U-100 0.3 mL 31 gauge x 15/64" Syrg USE TO INJECT INSULIN THREE TIMES DAILY      insulin syringe-needle U-100 1/2 mL 31 x 5/16" Syrg       ipratropium (ATROVENT) 42 mcg (0.06 %) nasal spray 2 sprays by Nasal route every 6 (six) hours as needed for Rhinitis (use as needed for runny nose and also use 15 minutes prior to meal). Clean nose with saline prior to use 15 mL 3    isosorbide mononitrate (IMDUR) 30 MG 24 hr tablet Take 30 mg by mouth once daily.      lancets Misc To check BG 3 times daily, to use with insurance preferred meter 200 each 11    LIDOcaine (LIDODERM) 5 % Place 1 patch onto the skin once daily. Remove & Discard " patch within 12 hours or as directed by MD Townsend patch 1    meclizine (ANTIVERT) 12.5 mg tablet Take 1 tablet (12.5 mg total) by mouth 3 (three) times daily as needed for Dizziness. 90 tablet 0    metformin (GLUCOPHAGE) 500 MG tablet Take 1,000 mg by mouth 2 (two) times daily with meals. 2 Tablets in the morning, 2 Tablets in the evening      methocarbamoL (ROBAXIN) 500 MG Tab TAKE 1 TABLET BY MOUTH 4 TIMES DAILY FOR 10 DAYS 60 tablet 0    nitroGLYCERIN (NITROSTAT) 0.4 MG SL tablet DISSOLVE 1 TABLET UNDER THE TONGUE EVERY 5 MINUTES AS  NEEDED FOR CHEST PAIN. MAX  OF 3 TABLETS IN 15 MINUTES. CALL 911 IF PAIN PERSISTS.      pravastatin (PRAVACHOL) 20 MG tablet Take 1 tablet (20 mg total) by mouth once daily. 90 tablet 1    spironolactone (ALDACTONE) 25 MG tablet Take 25 mg by mouth.      tiotropium-olodateroL (STIOLTO RESPIMAT) 2.5-2.5 mcg/actuation Mist Inhale 1 puff into the lungs once daily. Controller 1 g 11    triamcinolone acetonide 0.1% (KENALOG) 0.1 % cream SMARTSI Application Topical 2-3 Times Daily      valsartan (DIOVAN) 80 MG tablet Take 80 mg by mouth.      VIT C/VIT E AC/LUT/COPPER/ZINC (PRESERVISION LUTEIN ORAL) Take by mouth.      warfarin (COUMADIN) 5 MG tablet Take 2 tabs by mouth on Tuesday,Thursday, Saturday and Sundays then 1.5 on all other days.      blood-glucose meter kit To check BG 3 times daily, to use with insurance preferred meter 1 each 0    DULoxetine (CYMBALTA) 30 MG capsule Take 1 capsule (30 mg total) by mouth once daily. 30 capsule 1     No current facility-administered medications on file prior to visit.       Objective:      BP (!) 121/58 (BP Location: Right arm, Patient Position: Sitting, BP Method: Medium (Automatic))   Pulse 72   Resp 18   SpO2 95%     Exam:  GEN:  Well developed, well nourished.  No acute distress.   HEENT:  No trauma.  Mucous membranes moist.  Nares patent bilaterally.  PSYCH: Normal affect. Thought content appropriate.  CHEST:  Breathing symmetric.  No  audible wheezing.  ABD: Soft, non-distended.  SKIN:  Warm, pink, dry.  No rash on exposed areas.    EXT:  No cyanosis, clubbing, or edema.  No color change or changes in nail or hair growth.  NEURO/MUSCULOSKELETAL:  Fully alert, oriented, and appropriate. Speech normal keesha. No cranial nerve deficits.   Gait:  Antalgic.  Uses rolling walker for ambulation.  No focal motor deficits.       Imaging:      Narrative & Impression    EXAMINATION:  CT LUMBAR SPINE WITHOUT CONTRAST     CLINICAL HISTORY:  Lumbar radiculopathy, no red flags, no prior management;  Radiculopathy, lumbar region     TECHNIQUE:  Low-dose axial, sagittal and coronal reformations are obtained through the lumbar spine.  Contrast was not administered.     COMPARISON:  02/25/2015     FINDINGS:  Alignment: Grade 1 anterolisthesis at L4-L5.     Vertebrae: No fracture. No lytic or blastic lesion.     Discs: Mild disc height loss at L3-L5.     Sacroiliac joints: Mild degenerative changes.     Degenerative findings:     T12-L1: No spinal canal stenosis or neural foraminal narrowing.     L1-L2: No spinal canal stenosis or neural foraminal narrowing.     L2-L3: Circumferential disc bulge and mild facet arthropathy resulting in mild bilateral neural foraminal narrowing.     L3-L4: Circumferential disc bulge and moderate facet arthropathy resulting in moderate to severe spinal canal stenosis and moderate bilateral neural foraminal narrowing.     L4-L5: Circumferential disc bulge and severe facet arthropathy resulting in severe spinal canal stenosis and moderate bilateral neural foraminal narrowing.     L5-S1: Circumferential disc bulge and severe facet arthropathy resulting in mild bilateral neural foraminal narrowing.     Paraspinal muscles & soft tissues: Mild paraspinal muscle atrophy.  Vascular calcifications with partially visualized ectasia of the abdominal aorta and iliac arteries.  Fibrotic changes at the lung bases.     Impression:     1. Multilevel  degenerative changes of the lumbar spine detailed above.  Moderate to severe spinal canal stenosis at L4-S1.  Moderate neural foraminal narrowing at L3-L5.  2. 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        Electronically signed by: Yogesh Gamez MD  Date:                                            10/19/2022  Time:                                           09:56       Assessment:       1. DDD (degenerative disc disease), lumbar  DRUGS OF ABUSE SCREEN, BLOOD    CANCELED: [facility] Drug Screen      2. Lumbar radiculopathy  DRUGS OF ABUSE SCREEN, BLOOD      3. Lumbar spondylosis        4. Spinal stenosis of lumbar region with neurogenic claudication  DRUGS OF ABUSE SCREEN, BLOOD    CANCELED: Pain Clinic Drug Screen      5. Chronic right-sided low back pain with right-sided sciatica                   Plan:       Percy was seen today for follow-up.    Diagnoses and all orders for this visit:    DDD (degenerative disc disease), lumbar  -     Cancel: Pain Clinic Drug Screen  -     DRUGS OF ABUSE SCREEN, BLOOD; Future    Lumbar radiculopathy  -     DRUGS OF ABUSE SCREEN, BLOOD; Future    Lumbar spondylosis    Spinal stenosis of lumbar region with neurogenic claudication  -     Cancel: Pain Clinic Drug Screen  -     DRUGS OF ABUSE SCREEN, BLOOD; Future    Chronic right-sided low back pain with right-sided sciatica            Percy Ramirez is a 82 y.o. male with chronic low back and lower extremity pain.  Subjectively symptoms most consistent with neurogenic claudication related to spinal stenosis.  Patient is noted to have multilevel moderate to severe spinal stenosis on lumbar CT scan.    Prior records reviewed.   Continue Norco 5-325 mg q.8 hours p.r.n..  Three, one month supplies of 60 pills per month provided today.  Patient reportedly taking half a pill 3 times a day, occasionally will take a full pill with episodes of increased pain.  Reports typically taking approximately 2 total  pills per day.  Prescription monitoring program reviewed today.  No inconsistencies noted.   Unable to complete UDS today as patient is unable to void.  Drugs of abuse blood screen ordered.  We will review results when available.  Opioid risk tool completed.  Low risk.  Pain contract signed on 03/24/2023  Dr. Santos is the provider of medication management and agrees with the plan of care.   Return to clinic in 3 months or sooner if needed.  At that time we will discuss efficacy of medications and make and necessary adjustments.       Fernando Downs PA-C  [facility]  Interventional Pain Management   06/15/2023    This note was created by combination of typed  and M-Modal dictation.  Transcription and phonetic errors may be present.  If there are any questions, please contact me.

## 2023-06-16 ENCOUNTER — OFFICE VISIT (OUTPATIENT)
Dept: PSYCHIATRY | Facility: CLINIC | Age: 83
End: 2023-06-16
Payer: MEDICARE

## 2023-06-16 VITALS
WEIGHT: 192.81 LBS | HEART RATE: 51 BPM | BODY MASS INDEX: 29.22 KG/M2 | OXYGEN SATURATION: 91 % | SYSTOLIC BLOOD PRESSURE: 114 MMHG | HEIGHT: 68 IN | DIASTOLIC BLOOD PRESSURE: 60 MMHG

## 2023-06-16 DIAGNOSIS — G31.84 MCI (MILD COGNITIVE IMPAIRMENT): ICD-10-CM

## 2023-06-16 DIAGNOSIS — F33.0 MAJOR DEPRESSIVE DISORDER, RECURRENT EPISODE, MILD: Primary | ICD-10-CM

## 2023-06-16 DIAGNOSIS — F41.1 GAD (GENERALIZED ANXIETY DISORDER): ICD-10-CM

## 2023-06-16 PROCEDURE — 3078F DIAST BP <80 MM HG: CPT | Mod: CPTII,S$GLB,,

## 2023-06-16 PROCEDURE — 1160F PR REVIEW ALL MEDS BY PRESCRIBER/CLIN PHARMACIST DOCUMENTED: ICD-10-PCS | Mod: CPTII,S$GLB,,

## 2023-06-16 PROCEDURE — 99999 PR PBB SHADOW E&M-EST. PATIENT-LVL IV: CPT | Mod: PBBFAC,,,

## 2023-06-16 PROCEDURE — 99215 PR OFFICE/OUTPT VISIT, EST, LEVL V, 40-54 MIN: ICD-10-PCS | Mod: S$GLB,,,

## 2023-06-16 PROCEDURE — 1157F PR ADVANCE CARE PLAN OR EQUIV PRESENT IN MEDICAL RECORD: ICD-10-PCS | Mod: CPTII,S$GLB,,

## 2023-06-16 PROCEDURE — 3078F PR MOST RECENT DIASTOLIC BLOOD PRESSURE < 80 MM HG: ICD-10-PCS | Mod: CPTII,S$GLB,,

## 2023-06-16 PROCEDURE — 1159F MED LIST DOCD IN RCRD: CPT | Mod: CPTII,S$GLB,,

## 2023-06-16 PROCEDURE — 1157F ADVNC CARE PLAN IN RCRD: CPT | Mod: CPTII,S$GLB,,

## 2023-06-16 PROCEDURE — 99999 PR PBB SHADOW E&M-EST. PATIENT-LVL IV: ICD-10-PCS | Mod: PBBFAC,,,

## 2023-06-16 PROCEDURE — 1159F PR MEDICATION LIST DOCUMENTED IN MEDICAL RECORD: ICD-10-PCS | Mod: CPTII,S$GLB,,

## 2023-06-16 PROCEDURE — 3074F SYST BP LT 130 MM HG: CPT | Mod: CPTII,S$GLB,,

## 2023-06-16 PROCEDURE — 3074F PR MOST RECENT SYSTOLIC BLOOD PRESSURE < 130 MM HG: ICD-10-PCS | Mod: CPTII,S$GLB,,

## 2023-06-16 PROCEDURE — 99215 OFFICE O/P EST HI 40 MIN: CPT | Mod: S$GLB,,,

## 2023-06-16 PROCEDURE — 1160F RVW MEDS BY RX/DR IN RCRD: CPT | Mod: CPTII,S$GLB,,

## 2023-06-16 RX ORDER — DULOXETIN HYDROCHLORIDE 60 MG/1
60 CAPSULE, DELAYED RELEASE ORAL DAILY
Qty: 90 CAPSULE | Refills: 1 | Status: SHIPPED | OUTPATIENT
Start: 2023-06-16 | End: 2023-11-14 | Stop reason: SDUPTHER

## 2023-06-16 NOTE — PROGRESS NOTES
"Outpatient Psychiatry Follow-Up Visit   6/16/2023    Clinical Status of Patient:  Outpatient (Ambulatory)    Chief Complaint:  Percy Ramirez is a 82 y.o. male who presents today for follow-up of depression and anxiety.  Met with patient.      Interval History and Content of Current Session 06/16/2023:    Pt is A+Ox 4.  Patients mood is "ok", affect appears congruent and appropriate. Pts thought process is normal and logical.  Pts speech is normal tone, normal rate, normal pitch, normal volume   Linear and logical, friendly and cooperative, normal eye contact, no psychomotor retardation.  Pt is calmly seated in chair during interview. Pt is casually dressed and well groomed.  Pt is seen using walker in clinic. Pt is using portable O2, nasal canula.  O2 stat 91 in clinic with Oxygen.      Patient states that he attempted to get off Cymbalta as instructed in clinic. 5 days after discontinuing Cymbalta Pts mood deteriorated: began experiencing anxiety, depression, and irritability. Patient states that he began taking Cymbalta again. Patient is currently taking Cymbalta 30MG QAM. Patient states that he now notices a difference when he was on Cymbalta 60 and 30, patient requesting to increase back to Cymbalta 60 at this time.     Patient states that his daughter and grandchildren are no longer living with them, currently living with daughter's boyfriend. Patient states that this has improved his mood. Patient continues to have disabled son living with him. Patient states that their relationship is strained but overall he enjoys having his son living with him.    Reports depression today as 3/10, and anxiety as 3/10.  Pt reports taking medications as prescribed and behaving appropriately during interview today.  Denies SI/HI/AVH. Denies side effects of medications.  Pt reports sleeping well and normal appetite.   Denies recreational drug use. Pt reports 0 drinks per week, denies tobacco use, denies Vaping, none- " "Caffeine.        Prior visit :  Psych Interview 03/17/2023:   Percy Ramirez is a 82 y.o. male with past psychiatric history of MDD and MCI  presented to for initial evaluation and treatment for mood.     Pt is A+Ox 4.  Patients mood is "ok", affect appears congruent and appropriate. Pts thought process is tangential.  Pts speech is normal tone, normal rate, normal pitch, normal volume  Friendly and cooperative, good eye contact, no psychomotor retardation.  Pt is calmly seated in chair during interview.  Pt is casually dressed and well groomed.  Patient arrives 2 hours early to appointment due to transportation.  Pt is attached to oxygen and is using a Walker to ambulate in clinic. States that at home he uses the walker. No current history of falls.  Patient is hard of hearing and sight.     Patient was previously seeing seen by Isabella Pollard for depression , states that he is currently taking Cymbalta 60 daily. States that he's been doing OK since their last visit and July 2022. States that he does not feel different on Cymbalta 60.  Patient currently has his son , daughter, and her two kids living with him. Patient states that he has a house full of people and is Lonely.  Pt states that his biggest stressor currently is his son, states that he is disabled and refuses to work. States that son will stay in his room all day and not assist with household chores.  the patient has not been to talk therapy call mom at this time patient states that he is not interested.  Pt has a long history of Med noncompliance.       Patient states that he has experienced a lot of loss in his life, states that he watched his dad die from my massive heart attack at 13 year old, his wife passed away in 2013, and his oldest son passed away in 2014. Patient endorses a history of trauma while growing up, states that after his father passed away his mother send him to a [facility] because she could not take care of him. " "Patient states that the group home was very strict and he was required to look there for four years.      Denies prior hx of psychiatric hospitalizations. Denies hx of suicide attempts. Pt denies hx self harm. Pt denies hx hallucinations.  Pt denies hx of eating disorders.   Pt Endorses hx trauma. Denies physical/sexual abuse. Pt denies symptoms including nightmares, hypervigilance, flashbacks, avoidance behaviors, and disassociation.     Reports depression today as 1/10, and anxiety as 2/10.  Reports sleeping 3 hrs per night, and poor appetite.   Denies SI/HI/AVH. Denies side effects of medications.  Pt states that there support consists of "no one"  Denies recreational drug use. Pt reports 0 drinks per week, denies tobacco use, denies Vaping, none- Caffeine.       Current Medication:  Cymbalta 60mg daily      Past meds: none     DX:  The patient complained of depressed mood with lethargy, decreased appetite , insomnia, psycho-motor retardation, anhedonia, apathy, worsening self-esteem, guilt, decreased concentration and ability to make decisions     Pt denies hx symptoms/episodes of yulia.     Admits to symptoms of anxiety including excessive anxiety/worry/fear, more days than not, about numerous issues, difficulty controlling the worry, over thinking, rumination, restlessness, poor concentration, fatigue, and increased irritability. Denies panic attacks at this time.     Past Psychiatric History:   Previous Psychiatric Hospitalizations: NO  Previous Medication Trials: NO  History of psychotherapy:  NO  Previous Suicide Attempts: NO  History of Violence:  NO  History of physical/sexual abuse: NO  Outpatient psychiatric provider(past): YES:         Substance Abuse History:   Tobacco: NO  Alcohol: NO  Illicit Substances: NO  Detox/Rehab: NO     Neurological History:   Seizures: NO  Head trauma: NO     Family Psychiatric History: Yes -   Mother - depression , anxiety, substance abuse     Social " History:  Developmental/Childhood:Achieved all developmental milestones timely  *Education: 9th grade  Employment Status/Finances:Retired   Relationship Status/Sexual Orientation:    Children: 2  Housing Status: Home    history:  YES:      Access to gun: YES: not locked up     Gnosticism:Non-practicing  Recreational activities: Nothing   Person patient is closest to/confides in: No one     Legal History:   Past Charges/Incarcerations:  No      Review of Systems     Review of Systems   Constitutional:  Negative for weight loss.   HENT:  Negative for tinnitus.    Eyes:  Negative for blurred vision.   Respiratory:  Negative for cough and shortness of breath.    Cardiovascular:  Negative for chest pain.   Gastrointestinal:  Negative for abdominal pain.   Genitourinary:  Negative for dysuria.   Musculoskeletal:  Negative for back pain and neck pain.   Skin:  Negative for rash.   Neurological:  Negative for dizziness, seizures and weakness.   Psychiatric/Behavioral:  Positive for depression. Negative for hallucinations, memory loss, substance abuse and suicidal ideas. The patient is nervous/anxious. The patient does not have insomnia.      Psychiatric Review Of Systems - Is patient experiencing or having changes in:  sleep: no  appetite: no  weight: no  energy/anergy: yes  interest/pleasure/anhedonia: yes  somatic symptoms: no  libido: no  anxiety/panic: yes  guilty/hopelessness: no  concentration: no  S.I.B.s/risky behavior: no  Irritability: no  Racing thoughts: no  Impulsive behaviors: no  Paranoia: no  AVH: no      Past Medical, Family and Social History: The patient's past medical, family and social history have been reviewed and updated as appropriate within the electronic medical record - see encounter notes.      Current Medications:   Medication List with Changes/Refills   New Medications    DULOXETINE (CYMBALTA) 60 MG CAPSULE    Take 1 capsule (60 mg total) by mouth once daily.   Current Medications  "   ACETAMINOPHEN (TYLENOL) 325 MG TABLET    Take 2 tablets (650 mg total) by mouth every 6 (six) hours as needed.    ALBUTEROL (PROVENTIL/VENTOLIN HFA) 90 MCG/ACTUATION INHALER    INHALE 2 PUFFS BY MOUTH EVERY 6 HOURS AS NEEDED FOR WHEEZING OR  SHORTNESS  OF  BREATH    ATORVASTATIN (LIPITOR) 40 MG TABLET    Take 40 mg by mouth once daily.    BD INSULIN PEN NEEDLE UF SHORT 31 GAUGE X 5/16" NDLE        BD INSULIN SYRINGE ULTRA-FINE 1/2 ML 31 GAUGE X 15/64" SYRG        BLOOD SUGAR DIAGNOSTIC STRP    To check BG 3 times daily, to use with insurance preferred meter    BLOOD-GLUCOSE METER KIT    To check BG 3 times daily, to use with insurance preferred meter    CARVEDILOL (COREG) 25 MG TABLET    Take 1 tablet (25 mg total) by mouth 2 (two) times daily.    CERAMIDES 1,3,6-II (CERAVE DAILY MOISTURIZING) LOTN    Apply twice daily to skin    CINNAMON BARK 500 MG CAPSULE    Take 1,000 mg by mouth once daily.      CLOPIDOGREL (PLAVIX) 75 MG TABLET    Take 75 mg by mouth.    DICLOFENAC SODIUM (VOLTAREN) 1 % GEL    Apply 2 g topically 2 (two) times daily.    DICLOFENAC SODIUM (VOLTAREN) 1 % GEL    Apply 2 g topically 3 (three) times daily.    ENTRESTO  MG PER TABLET    Take 1 tablet by mouth 2 (two) times daily.    FLUTICASONE PROPIONATE (FLONASE) 50 MCG/ACTUATION NASAL SPRAY    1 spray (50 mcg total) by Each Nostril route 2 (two) times daily.    FUROSEMIDE (LASIX) 40 MG TABLET    Take 40 mg by mouth once daily.    GABAPENTIN (NEURONTIN) 300 MG CAPSULE    Take 4 capsules (1,200 mg total) by mouth 2 (two) times daily.    GLIMEPIRIDE (AMARYL) 4 MG TABLET    Take 4 mg by mouth daily with breakfast.     HYDROCODONE-ACETAMINOPHEN (NORCO) 5-325 MG PER TABLET    Take 1 tablet by mouth every 8 (eight) hours as needed for Pain.    HYDROCODONE-ACETAMINOPHEN (NORCO) 5-325 MG PER TABLET    Take 1 tablet by mouth every 8 (eight) hours as needed for Pain.    HYDROCODONE-ACETAMINOPHEN (NORCO) 5-325 MG PER TABLET    Take 1 tablet by mouth " "every 8 (eight) hours as needed for Pain.    HYDROXYZINE HCL (ATARAX) 25 MG TABLET    Take 1 tablet (25 mg total) by mouth 3 (three) times daily as needed for Itching or Anxiety.    INSULIN NPH-INSULIN REGULAR, 70/30, (NOVOLIN 70/30) 100 UNIT/ML (70-30) INJECTION    Inject into the skin. Inject 10 units at breakfast and inject 10 units at night    INSULIN SYRINGE-NEEDLE U-100 0.3 ML 31 GAUGE X 15/64" SYRG    USE TO INJECT INSULIN THREE TIMES DAILY    INSULIN SYRINGE-NEEDLE U-100 1/2 ML 31 X 5/16" SYRG        IPRATROPIUM (ATROVENT) 42 MCG (0.06 %) NASAL SPRAY    2 sprays by Nasal route every 6 (six) hours as needed for Rhinitis (use as needed for runny nose and also use 15 minutes prior to meal). Clean nose with saline prior to use    ISOSORBIDE MONONITRATE (IMDUR) 30 MG 24 HR TABLET    Take 30 mg by mouth once daily.    LANCETS MISC    To check BG 3 times daily, to use with insurance preferred meter    LIDOCAINE (LIDODERM) 5 %    Place 1 patch onto the skin once daily. Remove & Discard patch within 12 hours or as directed by MD AGUDELONE (ANTIVERT) 12.5 MG TABLET    Take 1 tablet (12.5 mg total) by mouth 3 (three) times daily as needed for Dizziness.    METFORMIN (GLUCOPHAGE) 500 MG TABLET    Take 1,000 mg by mouth 2 (two) times daily with meals. 2 Tablets in the morning, 2 Tablets in the evening    METHOCARBAMOL (ROBAXIN) 500 MG TAB    TAKE 1 TABLET BY MOUTH 4 TIMES DAILY FOR 10 DAYS    NITROGLYCERIN (NITROSTAT) 0.4 MG SL TABLET    DISSOLVE 1 TABLET UNDER THE TONGUE EVERY 5 MINUTES AS  NEEDED FOR CHEST PAIN. MAX  OF 3 TABLETS IN 15 MINUTES. CALL 911 IF PAIN PERSISTS.    PRAVASTATIN (PRAVACHOL) 20 MG TABLET    Take 1 tablet (20 mg total) by mouth once daily.    SPIRONOLACTONE (ALDACTONE) 25 MG TABLET    Take 25 mg by mouth.    TIOTROPIUM-OLODATEROL (STIOLTO RESPIMAT) 2.5-2.5 MCG/ACTUATION MIST    Inhale 1 puff into the lungs once daily. Controller    TRIAMCINOLONE ACETONIDE 0.1% (KENALOG) 0.1 % CREAM    " "SMARTSI Application Topical 2-3 Times Daily    VALSARTAN (DIOVAN) 80 MG TABLET    Take 80 mg by mouth.    VIT C/VIT E AC/LUT/COPPER/ZINC (PRESERVISION LUTEIN ORAL)    Take by mouth.    WARFARIN (COUMADIN) 5 MG TABLET    Take 2 tabs by mouth on Tuesday,Thursday, Saturday and Sundays then 1.5 on all other days.   Discontinued Medications    DULOXETINE (CYMBALTA) 30 MG CAPSULE    Take 1 capsule (30 mg total) by mouth once daily.         Allergies:   Review of patient's allergies indicates:   Allergen Reactions    Aricept odt [donepezil] Diarrhea and Nausea And Vomiting    Codeine Nausea Only     weak    Ranexa [ranolazine]          Vitals   Vitals:    23 0815   BP: 114/60   Pulse: (!) 51          Labs/Imaging/Studies:   No results found for this or any previous visit (from the past 48 hour(s)).   No results found for: PHENYTOIN, PHENOBARB, VALPROATE, CBMZ    Compliance: yes    Side effects: None    Risk Parameters:  Patient reports no suicidal ideation  Patient reports no homicidal ideation  Patient reports no self-injurious behavior  Patient reports no violent behavior    Exam (detailed: at least 9 elements; comprehensive: all 15 elements)   Constitutional  Vitals:  Most recent vital signs, dated less than 90 days prior to this appointment, were reviewed.   Vitals:    23 0815   BP: 114/60   Pulse: (!) 51   SpO2: (!) 91%   Weight: 87.5 kg (192 lb 12.7 oz)   Height: 5' 8" (1.727 m)        General:  unremarkable, age appropriate     Musculoskeletal  Muscle Strength/Tone:  no spasicity, no rigidity, no cogwheeling, no flaccidity, no paratonia, no dyskinesia, no dystonia, no tremor, no tic, no choreoathetosis, no atrophy   Gait & Station:  uses walker     Psychiatric  Speech:  no latency; no press   Mood & Affect:  steady, anxious, depressed  congruent and appropriate   Thought Process:  normal and logical   Associations:  intact   Thought Content:  normal, no suicidality, no homicidality, delusions, or " paranoia   Insight:  intact, has awareness of illness   Judgement: behavior is adequate to circumstances, age appropriate   Orientation:  grossly intact, person, place, situation, time/date   Memory: intact for content of interview, grossly intact, memory >3 objects at five mins   Language: grossly intact, able to name, able to repeat   Attention Span & Concentration:  able to focus, completed tasks   Fund of Knowledge:  intact and appropriate to age and level of education, familiar with aspects of current personal life     Assessment and Diagnosis   Status/Progress: Based on the examination today, the patient's problem(s) is/are resolving.  New problems have not been presented today.       General Impression:      ICD-10-CM ICD-9-CM   1. Major depressive disorder, recurrent episode, mild  F33.0 296.31   2. MCI (mild cognitive impairment)  G31.84 331.83   3. GERALD (generalized anxiety disorder)  F41.1 300.02       Intervention/Counseling/Treatment Plan   Mood   Increase Cymbalta 60mg QAM - Targeting anxiety and depression  Pt states that his mood was better on Cymbalta, requesting increase to original dose   Labs reviewed, Kideny function looks ok.  Will continue to monitor     Patient states that at this time he does not want to see a talk therapist, I discussed extensively with patient that I believe this would be beneficial in coping with the current stressors he is experiencing. Will reassess that next appointment.       Referral for talk therapy placed.  Pt was extensively educated in the importance of making and keeping a talk therapy appointment.     Vitals in clinic   O2 Stat 91% on oxygen.  Pt has COPD  In clinic Pt denies HA, SOB, Leg swelling, leg pain, CP, changes in vision       Discussed diagnosis, risks and benefits of proposed treatment above vs alternative treatments vs no treatment, and potential side effects of these treatments, and the inherent unpredictability of individual response to treatment.   The patient expresses understanding and gives informed consent to pursue treatment.  The potential benefits outweigh the potential risks. Patient has no other questions. Risks/adverse effects discussed at this time including but not limited to: GI side effects, sexual dysfunction, activation vs sedation, triggering of suicidal thoughts, and serotonin syndrome.    Serotonin syndrome   Mental status changes can include anxiety, restlessness, disorientation, and agitated delirium.    Autonomic manifestations can include diaphoresis, tachycardia, hyperthermia, hypertension, vomiting, and diarrhea   Neuromuscular hyperactivity can manifest as tremor, myoclonus, hyperreflexia, rigidity, hyperthermia, seizure, and bilateral Babinski sign.   Pt was informed that if they experience any of these symptoms to go the ED.     Difficulty Sleeping Behavioral Modification:  Implement stimulus control: [place] bedroom for sleep only. Leave bedroom when frustrated from not sleeping. Engage in relaxation before returning. Engage in activities during the day. AVOID >7-8 h time in bed  Avoid clock watching  Avoid thinking/worrying about sleep when trying to fall asleep  Limit caffeinated consumption  Make sure the bedroom is dark, quiet and cool    Safety Plan   Patient voices understanding and agreement with this plan  Provided crisis numbers  Encouraged patient to keep future appointments.  Instructed patient to call or message with questions or concerns  In the event of an emergency, including suicidal ideation, patient was advised to go to the emergency room and/or call 911    Return to Clinic: 3 months    Psychotherapy:  Target symptoms: depression, anxiety   Why chosen therapy is appropriate versus another modality: relevant to diagnosis, evidence based practice  Outcome monitoring methods: self-report, observation  Therapeutic intervention type: insight oriented psychotherapy, interactive psychotherapy  Topics discussed/themes:  relationships difficulties, building skills sets for symptom management, symptom recognition  The patient's response to the intervention is guarded. The patient's progress toward treatment goals is fair.   Duration of intervention: 15 minutes.    Total face to face time: 35 min  Total time (chart review, patient contact, documentation): 42 min    A diagnostic psychiatric evaluation was performed and responsiveness to treatment was assessed.  The patient demonstrates adequate ability/capacity to respond to treatment.    Lauro Parada PA-C    *This note has been prepared using a combination of a dictation device and typing.  It has been checked for errors but some errors may still exist within the note as a result of speech recognition errors and/or typographical errors.

## 2023-06-19 LAB
ALBUMIN CREATININE RATIO: 84 MG/G
ALBUMIN, URINE: 5.4 MG/L
AMPHETAMINES SERPL QL: NEGATIVE
BARBITURATES SERPL QL SCN: NEGATIVE
BENZODIAZ SERPL QL SCN: NEGATIVE
BZE SERPL QL: NEGATIVE
CARBOXYTHC SERPL QL SCN: NEGATIVE
CHOL/HDLC RATIO: 3
CHOLESTEROL, TOTAL: 84
CREATININE RANDOM URINE: 64 MG/DL (ref 20–320)
EGFR: 57
ETHANOL SERPL QL SCN: NEGATIVE
HBA1C MFR BLD: 8.5 % (ref 4–5.6)
HDLC SERPL-MCNC: 28 MG/DL
LDLC SERPL CALC-MCNC: 40 MG/DL
METHADONE SERPL QL SCN: NEGATIVE
NONHDLC SERPL-MCNC: 56 MG/DL
OPIATES SERPL QL SCN: NEGATIVE
PCP SERPL QL SCN: NEGATIVE
PROPOXYPH SERPL QL: NEGATIVE
TRIGL SERPL-MCNC: 75 MG/DL

## 2023-06-21 ENCOUNTER — OFFICE VISIT (OUTPATIENT)
Dept: NEUROLOGY | Facility: CLINIC | Age: 83
End: 2023-06-21
Payer: MEDICARE

## 2023-06-21 VITALS
DIASTOLIC BLOOD PRESSURE: 80 MMHG | HEART RATE: 73 BPM | WEIGHT: 194.88 LBS | BODY MASS INDEX: 29.54 KG/M2 | SYSTOLIC BLOOD PRESSURE: 137 MMHG | HEIGHT: 68 IN

## 2023-06-21 DIAGNOSIS — R42 DIZZINESS AND GIDDINESS: Primary | ICD-10-CM

## 2023-06-21 DIAGNOSIS — I67.89 OTHER CEREBROVASCULAR DISEASE: ICD-10-CM

## 2023-06-21 PROCEDURE — 1125F AMNT PAIN NOTED PAIN PRSNT: CPT | Mod: CPTII,S$GLB,, | Performed by: STUDENT IN AN ORGANIZED HEALTH CARE EDUCATION/TRAINING PROGRAM

## 2023-06-21 PROCEDURE — 1159F MED LIST DOCD IN RCRD: CPT | Mod: CPTII,S$GLB,, | Performed by: STUDENT IN AN ORGANIZED HEALTH CARE EDUCATION/TRAINING PROGRAM

## 2023-06-21 PROCEDURE — 3288F PR FALLS RISK ASSESSMENT DOCUMENTED: ICD-10-PCS | Mod: CPTII,S$GLB,, | Performed by: STUDENT IN AN ORGANIZED HEALTH CARE EDUCATION/TRAINING PROGRAM

## 2023-06-21 PROCEDURE — 99214 OFFICE O/P EST MOD 30 MIN: CPT | Mod: S$GLB,,, | Performed by: STUDENT IN AN ORGANIZED HEALTH CARE EDUCATION/TRAINING PROGRAM

## 2023-06-21 PROCEDURE — 3079F DIAST BP 80-89 MM HG: CPT | Mod: CPTII,S$GLB,, | Performed by: STUDENT IN AN ORGANIZED HEALTH CARE EDUCATION/TRAINING PROGRAM

## 2023-06-21 PROCEDURE — 3079F PR MOST RECENT DIASTOLIC BLOOD PRESSURE 80-89 MM HG: ICD-10-PCS | Mod: CPTII,S$GLB,, | Performed by: STUDENT IN AN ORGANIZED HEALTH CARE EDUCATION/TRAINING PROGRAM

## 2023-06-21 PROCEDURE — 1125F PR PAIN SEVERITY QUANTIFIED, PAIN PRESENT: ICD-10-PCS | Mod: CPTII,S$GLB,, | Performed by: STUDENT IN AN ORGANIZED HEALTH CARE EDUCATION/TRAINING PROGRAM

## 2023-06-21 PROCEDURE — 3075F PR MOST RECENT SYSTOLIC BLOOD PRESS GE 130-139MM HG: ICD-10-PCS | Mod: CPTII,S$GLB,, | Performed by: STUDENT IN AN ORGANIZED HEALTH CARE EDUCATION/TRAINING PROGRAM

## 2023-06-21 PROCEDURE — 99999 PR PBB SHADOW E&M-EST. PATIENT-LVL IV: CPT | Mod: PBBFAC,,, | Performed by: STUDENT IN AN ORGANIZED HEALTH CARE EDUCATION/TRAINING PROGRAM

## 2023-06-21 PROCEDURE — 99999 PR PBB SHADOW E&M-EST. PATIENT-LVL IV: ICD-10-PCS | Mod: PBBFAC,,, | Performed by: STUDENT IN AN ORGANIZED HEALTH CARE EDUCATION/TRAINING PROGRAM

## 2023-06-21 PROCEDURE — 1101F PT FALLS ASSESS-DOCD LE1/YR: CPT | Mod: CPTII,S$GLB,, | Performed by: STUDENT IN AN ORGANIZED HEALTH CARE EDUCATION/TRAINING PROGRAM

## 2023-06-21 PROCEDURE — 1101F PR PT FALLS ASSESS DOC 0-1 FALLS W/OUT INJ PAST YR: ICD-10-PCS | Mod: CPTII,S$GLB,, | Performed by: STUDENT IN AN ORGANIZED HEALTH CARE EDUCATION/TRAINING PROGRAM

## 2023-06-21 PROCEDURE — 1159F PR MEDICATION LIST DOCUMENTED IN MEDICAL RECORD: ICD-10-PCS | Mod: CPTII,S$GLB,, | Performed by: STUDENT IN AN ORGANIZED HEALTH CARE EDUCATION/TRAINING PROGRAM

## 2023-06-21 PROCEDURE — 3075F SYST BP GE 130 - 139MM HG: CPT | Mod: CPTII,S$GLB,, | Performed by: STUDENT IN AN ORGANIZED HEALTH CARE EDUCATION/TRAINING PROGRAM

## 2023-06-21 PROCEDURE — 3288F FALL RISK ASSESSMENT DOCD: CPT | Mod: CPTII,S$GLB,, | Performed by: STUDENT IN AN ORGANIZED HEALTH CARE EDUCATION/TRAINING PROGRAM

## 2023-06-21 PROCEDURE — 1157F PR ADVANCE CARE PLAN OR EQUIV PRESENT IN MEDICAL RECORD: ICD-10-PCS | Mod: CPTII,S$GLB,, | Performed by: STUDENT IN AN ORGANIZED HEALTH CARE EDUCATION/TRAINING PROGRAM

## 2023-06-21 PROCEDURE — 99214 PR OFFICE/OUTPT VISIT, EST, LEVL IV, 30-39 MIN: ICD-10-PCS | Mod: S$GLB,,, | Performed by: STUDENT IN AN ORGANIZED HEALTH CARE EDUCATION/TRAINING PROGRAM

## 2023-06-21 PROCEDURE — 1157F ADVNC CARE PLAN IN RCRD: CPT | Mod: CPTII,S$GLB,, | Performed by: STUDENT IN AN ORGANIZED HEALTH CARE EDUCATION/TRAINING PROGRAM

## 2023-06-21 NOTE — PROGRESS NOTES
"    Chief Complaint      Chief Complaint   Patient presents with    Dizziness   Ongoing     History of Present Illness     Percy Ramirez is a 82 y.o. male with a history of multiple medical diagnoses as listed below that presents for vertigo/dizziness, states has seen multiple specialists including his ophthalmologist and associated with macular degeneration. Describes it as when he looks at objects, he feels like a boat is rocking. Denies ear ringing, has been on meclizine to see if any relief.    Patient is on warfarin and plavix. Has CAD, s/p CABG. With bilateral caronary artery disease among his other medical problems.    Review of patient's allergies indicates:   Allergen Reactions    Aricept odt [donepezil] Diarrhea and Nausea And Vomiting    Codeine Nausea Only     weak    Ranexa [ranolazine]      Current Outpatient Medications   Medication Sig Dispense Refill    acetaminophen (TYLENOL) 325 MG tablet Take 2 tablets (650 mg total) by mouth every 6 (six) hours as needed. 13 tablet 0    albuterol (PROVENTIL/VENTOLIN HFA) 90 mcg/actuation inhaler INHALE 2 PUFFS BY MOUTH EVERY 6 HOURS AS NEEDED FOR WHEEZING OR  SHORTNESS  OF  BREATH 54 g 0    atorvastatin (LIPITOR) 40 MG tablet Take 40 mg by mouth once daily.      BD INSULIN PEN NEEDLE UF SHORT 31 gauge x 5/16" Ndle       BD INSULIN SYRINGE ULTRA-FINE 1/2 mL 31 gauge x 15/64" Syrg       blood sugar diagnostic Strp To check BG 3 times daily, to use with insurance preferred meter 200 strip 11    carvedilol (COREG) 25 MG tablet Take 1 tablet (25 mg total) by mouth 2 (two) times daily. 180 tablet 0    ceramides 1,3,6-II (CERAVE DAILY MOISTURIZING) Lotn Apply twice daily to skin 355 mL 1    cinnamon bark 500 mg capsule Take 1,000 mg by mouth once daily.        clopidogreL (PLAVIX) 75 mg tablet Take 75 mg by mouth.      diclofenac sodium (VOLTAREN) 1 % Gel Apply 2 g topically 2 (two) times daily. 100 g 0    diclofenac sodium (VOLTAREN) 1 % Gel Apply 2 g topically 3 " "(three) times daily. 50 g 0    DULoxetine (CYMBALTA) 60 MG capsule Take 1 capsule (60 mg total) by mouth once daily. 90 capsule 1    ENTRESTO  mg per tablet Take 1 tablet by mouth 2 (two) times daily.      fluticasone propionate (FLONASE) 50 mcg/actuation nasal spray 1 spray (50 mcg total) by Each Nostril route 2 (two) times daily. 18.2 mL 3    furosemide (LASIX) 40 MG tablet Take 40 mg by mouth once daily.      gabapentin (NEURONTIN) 300 MG capsule Take 4 capsules (1,200 mg total) by mouth 2 (two) times daily. (Patient taking differently: Take 900 mg by mouth 2 (two) times daily.) 540 capsule 1    glimepiride (AMARYL) 4 MG tablet Take 4 mg by mouth daily with breakfast.       [START ON 6/22/2023] HYDROcodone-acetaminophen (NORCO) 5-325 mg per tablet Take 1 tablet by mouth every 8 (eight) hours as needed for Pain. 60 tablet 0    [START ON 7/22/2023] HYDROcodone-acetaminophen (NORCO) 5-325 mg per tablet Take 1 tablet by mouth every 8 (eight) hours as needed for Pain. 60 tablet 0    [START ON 8/21/2023] HYDROcodone-acetaminophen (NORCO) 5-325 mg per tablet Take 1 tablet by mouth every 8 (eight) hours as needed for Pain. 60 tablet 0    hydrOXYzine HCL (ATARAX) 25 MG tablet Take 1 tablet (25 mg total) by mouth 3 (three) times daily as needed for Itching or Anxiety. 90 tablet 3    insulin NPH-insulin regular, 70/30, (NOVOLIN 70/30) 100 unit/mL (70-30) injection Inject into the skin. Inject 10 units at breakfast and inject 10 units at night      insulin syringe-needle U-100 0.3 mL 31 gauge x 15/64" Syrg USE TO INJECT INSULIN THREE TIMES DAILY      insulin syringe-needle U-100 1/2 mL 31 x 5/16" Syrg       ipratropium (ATROVENT) 42 mcg (0.06 %) nasal spray 2 sprays by Nasal route every 6 (six) hours as needed for Rhinitis (use as needed for runny nose and also use 15 minutes prior to meal). Clean nose with saline prior to use 15 mL 3    isosorbide mononitrate (IMDUR) 30 MG 24 hr tablet Take 30 mg by mouth once daily.  "     lancets Misc To check BG 3 times daily, to use with insurance preferred meter 200 each 11    LIDOcaine (LIDODERM) 5 % Place 1 patch onto the skin once daily. Remove & Discard patch within 12 hours or as directed by MD Townsend patch 1    meclizine (ANTIVERT) 12.5 mg tablet Take 1 tablet (12.5 mg total) by mouth 3 (three) times daily as needed for Dizziness. 90 tablet 0    metformin (GLUCOPHAGE) 500 MG tablet Take 1,000 mg by mouth 2 (two) times daily with meals. 2 Tablets in the morning, 2 Tablets in the evening      methocarbamoL (ROBAXIN) 500 MG Tab TAKE 1 TABLET BY MOUTH 4 TIMES DAILY FOR 10 DAYS 60 tablet 0    nitroGLYCERIN (NITROSTAT) 0.4 MG SL tablet DISSOLVE 1 TABLET UNDER THE TONGUE EVERY 5 MINUTES AS  NEEDED FOR CHEST PAIN. MAX  OF 3 TABLETS IN 15 MINUTES. CALL 911 IF PAIN PERSISTS.      pravastatin (PRAVACHOL) 20 MG tablet Take 1 tablet (20 mg total) by mouth once daily. 90 tablet 1    spironolactone (ALDACTONE) 25 MG tablet Take 25 mg by mouth.      tiotropium-olodateroL (STIOLTO RESPIMAT) 2.5-2.5 mcg/actuation Mist Inhale 1 puff into the lungs once daily. Controller 1 g 11    triamcinolone acetonide 0.1% (KENALOG) 0.1 % cream SMARTSI Application Topical 2-3 Times Daily      valsartan (DIOVAN) 80 MG tablet Take 80 mg by mouth.      VIT C/VIT E AC/LUT/COPPER/ZINC (PRESERVISION LUTEIN ORAL) Take by mouth.      warfarin (COUMADIN) 5 MG tablet Take 2 tabs by mouth on Tuesday,Thursday, Saturday and Sundays then 1.5 on all other days.      blood-glucose meter kit To check BG 3 times daily, to use with insurance preferred meter 1 each 0     No current facility-administered medications for this visit.       Medical History     Past Medical History:   Diagnosis Date    Allergy     Arthritis     CAD, multiple vessel     DR Melissa    Cataract     Depression     History of colon polyps     Hyperlipidemia     Hypertension     Macular degeneration     Dr Razo for injection, Jennifer for eye    S/P CABG x 2     Type  2 diabetes mellitus with circulatory disorder     Dr. Aquino     Past Surgical History:   Procedure Laterality Date    broken elbow      left    COLONOSCOPY N/A 10/4/2017    Procedure: COLONOSCOPY;  Surgeon: Gabriel Leung MD;  Location: [facility];  Service: Endoscopy;  Laterality: N/A;    EYE SURGERY      cataract    heart bypass      2004    HERNIA REPAIR      right    ROTATOR CUFF REPAIR      right  2004     Family History   Problem Relation Age of Onset    Arthritis Mother     Diabetes Mother     Stroke Mother     Heart attack Father     Cancer Brother     Throat cancer Brother     Diabetes Maternal Aunt     Diabetes Maternal Uncle     Heart disease Maternal Uncle     Diabetes Paternal Aunt     Heart disease Paternal Aunt     Heart disease Paternal Uncle     Heart disease Maternal Grandmother     Cancer Maternal Grandfather      Social History     Socioeconomic History    Marital status:     Number of children: 4    Years of education: GED   Occupational History    Occupation: retired tug    Tobacco Use    Smoking status: Former     Packs/day: 3.00     Years: 45.00     Pack years: 135.00     Types: Cigarettes     Quit date: 2004     Years since quittin.1    Smokeless tobacco: Former   Substance and Sexual Activity    Alcohol use: Yes     Comment: was heavy drinker 35 years ago, rare use now    Drug use: No    Sexual activity: Yes     Partners: Female       Exam     Vitals:    23 0945   BP: 137/80   Pulse: 73      Physical Exam:  General: Not in acute distress. Not ill-appearing.   HENT: Normocephalic and atraumatic. Moist mucous membranes.  Eyes: Conjunctivae normal.   Pulmonary: Pulmonary effort is normal.   Abdominal: Abdomen is soft and flat.   Skin: Skin is warm and dry. No rashes.   Psychiatric: Mood normal.        Neurologic Exam   Mental status: oriented to person, place, and time  Attention: Normal. Concentration: normal.  Speech: speech is normal.  Cranial Nerves:  "PERRL, EOMI intact, V1-V3 Facial sensation intact. Symmetric facies. Hearing grossly intact. Palate and uvula midline, symmetric. No tongue deviation. Trapezius strength intact.     Motor exam: bulk and tone normal. Strength 5/5 grossly of upper and lower extremities    Reflexes: 2+ in bilateral upper extremities: biceps and brachiaradialis, 2+ in bilateral lower extremities: patellar     Sensory exam: light touch intact    Gait exam: uses a rolling walker    Labs and Imaging     Labs: reviewed  A1C 8, THS wnl, LDL 81    Imaging: personally reviewed  10/2022 CT lumbar spine - multilevel disc degenerative changes, moderate to severe canal stenosis L4-S1, moderate neural foraminal narrowing L3-L5    7/2022- CTH with chronic microvascular changes    Other procedures: reviewed  7/2022 - US carotid bilateral - 50-69% stenosis on L    Assessment and Plan     Problem List Items Addressed This Visit    None  Visit Diagnoses       Dizziness and giddiness    -  Primary    Relevant Orders    US Carotid Bilateral    Other cerebrovascular disease        Relevant Orders    US Carotid Bilateral          Patient is new to me.    Patient w hx significant for CAD and CABG x 2, carotid artery stenosis. Has macular degeneration in which he feels like "boat is rocking." Meclizine w mild improvement. Sees optho.   Will get US carotid given one in 7/2022 with L 50-69% stenosis - if worsens could potentially warrant intervention, patient on warfarin and plavix and follows w cardiology.     Questions answered, plans discussed    Follow-up: after ultrasound  "

## 2023-06-27 ENCOUNTER — TELEPHONE (OUTPATIENT)
Dept: PULMONOLOGY | Facility: CLINIC | Age: 83
End: 2023-06-27
Payer: MEDICARE

## 2023-06-27 NOTE — TELEPHONE ENCOUNTER
Spoke with patient about portable O2 machine.              ----- Message from Mor Li MA sent at 6/27/2023 10:12 AM CDT -----  Lobo Templeton V Staff  Caller: Unspecified (Today, 10:06 AM)      Name of Who is Calling: JAILENE JIMENES [[id number]]       What is the request in detail: Pt called to confirm what is  the 2% on the oxygen machine.Please contact to further discuss and advise.           Can the clinic reply by [facility]: Y       What Number to Call Back [facility] not [facility]: [phone]

## 2023-07-11 ENCOUNTER — OFFICE VISIT (OUTPATIENT)
Dept: PODIATRY | Facility: CLINIC | Age: 83
End: 2023-07-11
Payer: MEDICARE

## 2023-07-11 VITALS — BODY MASS INDEX: 29.4 KG/M2 | WEIGHT: 194 LBS | HEIGHT: 68 IN

## 2023-07-11 DIAGNOSIS — M20.42 HAMMER TOES OF BOTH FEET: ICD-10-CM

## 2023-07-11 DIAGNOSIS — L84 CORN OR CALLUS: ICD-10-CM

## 2023-07-11 DIAGNOSIS — M20.11 HALLUX ABDUCTO VALGUS, RIGHT: ICD-10-CM

## 2023-07-11 DIAGNOSIS — E11.49 TYPE II DIABETES MELLITUS WITH NEUROLOGICAL MANIFESTATIONS: Primary | ICD-10-CM

## 2023-07-11 DIAGNOSIS — M20.12 HALLUX ABDUCTO VALGUS, LEFT: ICD-10-CM

## 2023-07-11 DIAGNOSIS — M20.41 HAMMER TOES OF BOTH FEET: ICD-10-CM

## 2023-07-11 DIAGNOSIS — B35.1 ONYCHOMYCOSIS DUE TO DERMATOPHYTE: ICD-10-CM

## 2023-07-11 PROCEDURE — 99499 UNLISTED E&M SERVICE: CPT | Mod: S$GLB,,, | Performed by: PODIATRIST

## 2023-07-11 PROCEDURE — 11056 PARNG/CUTG B9 HYPRKR LES 2-4: CPT | Mod: Q9,S$GLB,, | Performed by: PODIATRIST

## 2023-07-11 PROCEDURE — 99999 PR PBB SHADOW E&M-EST. PATIENT-LVL V: ICD-10-PCS | Mod: PBBFAC,,, | Performed by: PODIATRIST

## 2023-07-11 PROCEDURE — 11721 PR DEBRIDEMENT OF NAILS, 6 OR MORE: ICD-10-PCS | Mod: 59,Q9,S$GLB, | Performed by: PODIATRIST

## 2023-07-11 PROCEDURE — 11721 DEBRIDE NAIL 6 OR MORE: CPT | Mod: 59,Q9,S$GLB, | Performed by: PODIATRIST

## 2023-07-11 PROCEDURE — 99999 PR PBB SHADOW E&M-EST. PATIENT-LVL V: CPT | Mod: PBBFAC,,, | Performed by: PODIATRIST

## 2023-07-11 PROCEDURE — 11056 PR TRIM BENIGN HYPERKERATOTIC SKIN LESION,2-4: ICD-10-PCS | Mod: Q9,S$GLB,, | Performed by: PODIATRIST

## 2023-07-11 PROCEDURE — 99499 NO LOS: ICD-10-PCS | Mod: S$GLB,,, | Performed by: PODIATRIST

## 2023-07-11 NOTE — PATIENT INSTRUCTIONS
Over the counter pain creams: Voltaren Gel, Biofreeze, Bengay, tiger balm, two old goat, lidocaine gel,  Absorbine Veterinary Liniment Gel Topical Analgesic Sore Muscle and Joint Pain Relief  Recommend lotions: eucerin, eucerin for diabetics, aquaphor, A&D ointment, gold bond for diabetics, sween, Exeter's Bees all purpose baby ointment,  urea 40 with aloe or SkinIntegra rapid crack repair (found on amazon.com)  Shoe recommendations: (try 6pm.com, zappos.com , nordstromrack.[place] Carlife Assist, or shoes.com for discounted prices) you can visit varsity shoes in [place], DSW shoes in [place]  or petar rack in the [facility] (there are also several shoe brand outlets in the [facility])  ONLY purchase stability style tennis shoes NO flex, foam, free, yoga mat style shoes  Asics (GT 4000 or gel foundations), new balance stability type shoes (such as the 940 series), saucony (stabil c3),  Mahoney (GTS or Beast or transcend), propet, HokaOne (tennis shoe) Jose (tennis shoes and boots)  Sofft Brand (women) Gustabo&Billy (men), clarks, crocs, aerosoles, naturalizers, SAS, ecco, born, ngoc lara, rockports (dress shoes)  Vionic, burkenstocks, fitflops, propet, taos, baretraps (sandals)  HokaOne sandals, crocs, propet (house shoes)    Nail Home remedy:  Vicks Vapor rub or Emuaid to nails for easier manageability    Diabetes: Inspecting Your Feet  Diabetes increases your chances of developing foot problems. So inspect your feet every day. This helps you find small skin irritations before they become serious infections. If you have trouble seeing the bottoms of your feet, use a mirror or ask a family member or friend to help.     Pressure spots on the bottom of the foot are common areas where problems develop.   How to check your feet  Below are tips to help you look for foot problems. Try to check your feet at the same time each day, such as when you get out of bed in the morning:  Check the top of each foot. The tops of toes, back of  the heel, and outer edge of the foot can get a lot of rubbing from poor-fitting shoes.  Check the bottom of each foot. Daily wear and tear often leads to problems at pressure spots.  Check the toes and nails. Fungal infections often occur between toes. Toenail problems can also be a sign of fungal infections or lead to breaks in the skin.  Check your shoes, too. Loose objects inside a shoe can injure the foot. Use your hand to feel inside your shoes for things like alfredo, loose stitching, or rough areas that could irritate your skin.  Warning signs  Look for any color changes in the foot. Redness with streaks can signal a severe infection, which needs immediate medical attention. Tell your doctor right away if you have any of these problems:  Swelling, sometimes with color changes, may be a sign of poor blood flow or infection. Symptoms include tenderness and an increase in the size of your foot.  Warm or hot areas on your feet may be signs of infection. A foot that is cold may not be getting enough blood.  Sensations such as burning, tingling, or pins and needles can be signs of a problem. Also check for areas that may be numb.  Hot spots are caused by friction or pressure. Look for hot spots in areas that get a lot of rubbing. Hot spots can turn into blisters, calluses, or sores.  Cracks and sores are caused by dry or irritated skin. They are a sign that the skin is breaking down, which can lead to infection.  Toenail problems to watch for include nails growing into the skin (ingrown toenail) and causing redness or pain. Thick, yellow, or discolored nails can signal a fungal infection.  Drainage and odor can develop from untreated sores and ulcers. Call your doctor right away if you notice white or yellow drainage, bleeding, or unpleasant odor.   © 8424-6835 [employer]. [address] [place] [address], [place] [address]. All rights reserved. This information is not intended as a substitute for  professional medical care. Always follow your healthcare professional's instructions.        Step-by-Step:  Inspecting Your Feet (Diabetes)    Date Last Reviewed: 10/1/2016  © 3597-0169 [employer]. [address] [place] [address], [place] [address]. All rights reserved. This information is not intended as a substitute for professional medical care. Always follow your healthcare professional's instructions.

## 2023-07-12 NOTE — PROGRESS NOTES
Subjective:      Patient ID: Percy Ramirez is a 82 y.o. male.    Chief Complaint: Diabetes Mellitus (Kasie Edmondson MD at 4/12/2023) and Nail Care      Percy is a 82 y.o. male who presents to the clinic for evaluation and treatment of high risk feet. Percy has a past medical history of Allergy, Arthritis, CAD, multiple vessel, Cataract, Depression, History of colon polyps, Hyperlipidemia, Hypertension, Macular degeneration, S/P CABG x 2, and Type 2 diabetes mellitus with circulatory disorder. The patient's chief complaint is elongated, thickened toenails aggravated by increased weight bearing, shoe gear, pressure. Hypersensitive to the distal toes.re relate he his his lef elbow on a door this morning and now has an abrasion.  This patient has documented high risk feet requiring routine maintenance secondary to diabetes mellitis and those secondary complications of diabetes, as mentioned..    PCP: Kasie Edmondson MD    Date Last Seen by PCP:   Chief Complaint   Patient presents with    Diabetes Mellitus     Kasie Edmondson MD at 4/12/2023    Nail Care     Current shoe gear:  Velcro tennis shoes    Hemoglobin A1C   Date Value Ref Range Status   04/05/2023 8.0 (H) 4.0 - 5.6 % Final     Comment:     ADA Screening Guidelines:  5.7-6.4%  Consistent with prediabetes  >or=6.5%  Consistent with diabetes    High levels of fetal hemoglobin interfere with the HbA1C  assay. Heterozygous hemoglobin variants (HbS, HgC, etc)do  not significantly interfere with this assay.   However, presence of multiple variants may affect accuracy.     03/08/2023 8.0 (H) 4.0 - 5.6 % Final     Comment:     [facility]   12/13/2022 8.3 (H) 4.0 - 5.6 % Final     Comment:     [facility]   08/30/2022 8.1 (H) 4.0 - 5.6 % Final     Comment:     [facility]     Patient Active Problem List   Diagnosis    Major depressive disorder, recurrent episode, mild    Benign hypertension with chronic kidney disease, stage III    Poorly controlled type 2  diabetes mellitus with retinopathy    Coronary artery disease    S/P CABG x 2    Macular degeneration    Obstructive sleep apnea treated with BiPAP    Anxiety state, unspecified    Insulin long-term use    Primary osteoarthritis of both knees    Chronic low back pain    DJD (degenerative joint disease), lumbar    Poorly controlled type 2 diabetes mellitus with neuropathy    Bilateral carotid artery disease    Hyperlipidemia LDL goal <100    Type 2 diabetes, with peripheral circulatory disorder not at goal    COPD with chronic bronchitis and emphysema    Atherosclerosis of abdominal aorta    Overweight (BMI 25.0-29.9)    Persistent atrial fibrillation    Chronic stable angina    Senile purpura    Goals of care, counseling/discussion    Osteoarthritis of carpometacarpal (CMC) joint of left thumb    MCI (mild cognitive impairment)    Chronic vertigo    Pulmonary nodule    Dyspnea on exertion    Chronic combined systolic and diastolic heart failure    Ambulates with cane    ILD (interstitial lung disease)    History of cardioversion    Erectile dysfunction    Secondary hyperparathyroidism of renal origin    Noncompliance with medication regimen    Exudative age-related macular degeneration, right eye, with active choroidal neovascularization    History of stroke    Chronic tension-type headache, intractable    Spinal stenosis of lumbar region with neurogenic claudication    Lumbar radiculopathy    Lumbar spondylosis    DDD (degenerative disc disease), lumbar     Current Outpatient Medications on File Prior to Visit   Medication Sig Dispense Refill    acetaminophen (TYLENOL) 325 MG tablet Take 2 tablets (650 mg total) by mouth every 6 (six) hours as needed. 13 tablet 0    albuterol (PROVENTIL/VENTOLIN HFA) 90 mcg/actuation inhaler INHALE 2 PUFFS BY MOUTH EVERY 6 HOURS AS NEEDED FOR WHEEZING OR  SHORTNESS  OF  BREATH 54 g 0    atorvastatin (LIPITOR) 40 MG tablet Take 40 mg by mouth once daily.      BD INSULIN PEN NEEDLE  "UF SHORT 31 gauge x 5/16" Ndle       BD INSULIN SYRINGE ULTRA-FINE 1/2 mL 31 gauge x 15/64" Syrg       blood sugar diagnostic Strp To check BG 3 times daily, to use with insurance preferred meter 200 strip 11    carvedilol (COREG) 25 MG tablet Take 1 tablet (25 mg total) by mouth 2 (two) times daily. 180 tablet 0    ceramides 1,3,6-II (CERAVE DAILY MOISTURIZING) Lotn Apply twice daily to skin 355 mL 1    cinnamon bark 500 mg capsule Take 1,000 mg by mouth once daily.        clopidogreL (PLAVIX) 75 mg tablet Take 75 mg by mouth.      diclofenac sodium (VOLTAREN) 1 % Gel Apply 2 g topically 2 (two) times daily. 100 g 0    diclofenac sodium (VOLTAREN) 1 % Gel Apply 2 g topically 3 (three) times daily. 50 g 0    DULoxetine (CYMBALTA) 60 MG capsule Take 1 capsule (60 mg total) by mouth once daily. 90 capsule 1    ENTRESTO  mg per tablet Take 1 tablet by mouth 2 (two) times daily.      fluticasone propionate (FLONASE) 50 mcg/actuation nasal spray 1 spray (50 mcg total) by Each Nostril route 2 (two) times daily. 18.2 mL 3    furosemide (LASIX) 40 MG tablet Take 40 mg by mouth once daily.      gabapentin (NEURONTIN) 300 MG capsule Take 4 capsules (1,200 mg total) by mouth 2 (two) times daily. (Patient taking differently: Take 900 mg by mouth 2 (two) times daily.) 540 capsule 1    glimepiride (AMARYL) 4 MG tablet Take 4 mg by mouth daily with breakfast.       HYDROcodone-acetaminophen (NORCO) 5-325 mg per tablet Take 1 tablet by mouth every 8 (eight) hours as needed for Pain. 60 tablet 0    [START ON 7/22/2023] HYDROcodone-acetaminophen (NORCO) 5-325 mg per tablet Take 1 tablet by mouth every 8 (eight) hours as needed for Pain. 60 tablet 0    [START ON 8/21/2023] HYDROcodone-acetaminophen (NORCO) 5-325 mg per tablet Take 1 tablet by mouth every 8 (eight) hours as needed for Pain. 60 tablet 0    hydrOXYzine HCL (ATARAX) 25 MG tablet Take 1 tablet (25 mg total) by mouth 3 (three) times daily as needed for Itching or " "Anxiety. 90 tablet 3    insulin NPH-insulin regular, 70/30, (NOVOLIN 70/30) 100 unit/mL (70-30) injection Inject into the skin. Inject 10 units at breakfast and inject 10 units at night      insulin syringe-needle U-100 0.3 mL 31 gauge x 15/64" Syrg USE TO INJECT INSULIN THREE TIMES DAILY      insulin syringe-needle U-100 1/2 mL 31 x 5/16" Syrg       ipratropium (ATROVENT) 42 mcg (0.06 %) nasal spray 2 sprays by Nasal route every 6 (six) hours as needed for Rhinitis (use as needed for runny nose and also use 15 minutes prior to meal). Clean nose with saline prior to use 15 mL 3    isosorbide mononitrate (IMDUR) 30 MG 24 hr tablet Take 30 mg by mouth once daily.      lancets Misc To check BG 3 times daily, to use with insurance preferred meter 200 each 11    LIDOcaine (LIDODERM) 5 % Place 1 patch onto the skin once daily. Remove & Discard patch within 12 hours or as directed by MD 5 patch 1    meclizine (ANTIVERT) 12.5 mg tablet Take 1 tablet (12.5 mg total) by mouth 3 (three) times daily as needed for Dizziness. 90 tablet 0    metformin (GLUCOPHAGE) 500 MG tablet Take 1,000 mg by mouth 2 (two) times daily with meals. 2 Tablets in the morning, 2 Tablets in the evening      methocarbamoL (ROBAXIN) 500 MG Tab TAKE 1 TABLET BY MOUTH 4 TIMES DAILY FOR 10 DAYS 60 tablet 0    nitroGLYCERIN (NITROSTAT) 0.4 MG SL tablet DISSOLVE 1 TABLET UNDER THE TONGUE EVERY 5 MINUTES AS  NEEDED FOR CHEST PAIN. MAX  OF 3 TABLETS IN 15 MINUTES. CALL 911 IF PAIN PERSISTS.      pravastatin (PRAVACHOL) 20 MG tablet Take 1 tablet (20 mg total) by mouth once daily. 90 tablet 1    spironolactone (ALDACTONE) 25 MG tablet Take 25 mg by mouth.      tiotropium-olodateroL (STIOLTO RESPIMAT) 2.5-2.5 mcg/actuation Mist Inhale 1 puff into the lungs once daily. Controller 1 g 11    triamcinolone acetonide 0.1% (KENALOG) 0.1 % cream SMARTSI Application Topical 2-3 Times Daily      valsartan (DIOVAN) 80 MG tablet Take 80 mg by mouth.      VIT C/VIT E " AC/LUT/COPPER/ZINC (PRESERVISION LUTEIN ORAL) Take by mouth.      warfarin (COUMADIN) 5 MG tablet Take 2 tabs by mouth on Tuesday,Thursday, Saturday and Sundays then 1.5 on all other days.      blood-glucose meter kit To check BG 3 times daily, to use with insurance preferred meter 1 each 0     No current facility-administered medications on file prior to visit.     Review of patient's allergies indicates:   Allergen Reactions    Codeine Nausea Only     weak     Past Surgical History:   Procedure Laterality Date    broken elbow      left    COLONOSCOPY N/A 10/4/2017    Procedure: COLONOSCOPY;  Surgeon: Gabriel Leung MD;  Location: [facility];  Service: Endoscopy;  Laterality: N/A;    EYE SURGERY      cataract    heart bypass      2004    HERNIA REPAIR      right    ROTATOR CUFF REPAIR      right  2004     Family History   Problem Relation Age of Onset    Arthritis Mother     Diabetes Mother     Stroke Mother     Heart attack Father     Cancer Brother     Throat cancer Brother     Diabetes Maternal Aunt     Diabetes Maternal Uncle     Heart disease Maternal Uncle     Diabetes Paternal Aunt     Heart disease Paternal Aunt     Heart disease Paternal Uncle     Heart disease Maternal Grandmother     Cancer Maternal Grandfather      Social History     Socioeconomic History    Marital status:     Number of children: 4    Years of education: GED   Occupational History    Occupation: retired tug    Tobacco Use    Smoking status: Former     Packs/day: 3.00     Years: 45.00     Pack years: 135.00     Types: Cigarettes     Quit date: 2004     Years since quittin.2    Smokeless tobacco: Former   Substance and Sexual Activity    Alcohol use: Yes     Comment: was heavy drinker 35 years ago, rare use now    Drug use: No    Sexual activity: Yes     Partners: Female     Review of Systems   Constitution: Negative for chills, fever and weakness.   Cardiovascular: Negative for claudication and leg  "swelling.   Respiratory: Positive for cough. Negative for shortness of breath.    Skin: Positive for dry skin and nail changes. Negative for itching and rash.   Musculoskeletal: Positive for arthritis, back pain and joint pain. Negative for falls, joint swelling and muscle weakness.   Gastrointestinal: Negative for diarrhea, nausea and vomiting.   Neurological: Positive for numbness and paresthesias. Negative for tremors.   Psychiatric/Behavioral: Negative for altered mental status and hallucinations.           Objective:       Vitals:    07/11/23 1006   Weight: 88 kg (194 lb)   Height: 5' 8" (1.727 m)   PainSc: 0-No pain       Physical Exam   Constitutional:   General: Pt. is well-developed, well-nourished, appears stated age, in no acute distress, alert and oriented x 3. No evidence of depression, anxiety, or agitation. Calm, cooperative, and communicative. Appropriate interactions and affect.       Cardiovascular:   Pulses:       Dorsalis pedis pulses are 2+ on the right side, and 2+ on the left side.        Posterior tibial pulses are 1+ on the right side, and 1+ on the left side.   There is decreased digital hair.   Musculoskeletal:        Right foot: There is normal range of motion.        Left foot:  There is normal range of motion.   Muscle strength is 5/5 in all groups bilaterally.    Decreased stride, station of gait.  apropulsive toe off.  Increased angle and base of gait.    Patient has hammertoes of digits 2-5 bilateral partially reducible without symptom today.    Visible and palpable bunion without pain at dorsomedial 1st metatarsal head right and left.  Hallux abducted right and left partially reducible, tracks laterally without being track bound.  No ecchymosis, erythema, edema, or cardinal signs infection or signs of trauma same foot.     Neurological: A sensory deficit is present.   Lansford-Keon 5.07 monofilamant testing is diminished Farhad feet. Sharp/dull sensation diminished Bilaterally "   Skin: Skin is warm, dry. No abrasion, no ecchymosis, no rash noted. He is not diaphoretic. No cyanosis. No pallor. Nails show no clubbing.   Medial and lateral hallux nail margin of right foot with ingrown nail plate and thick HPK. Surrounding erythema and minimal edema is noted there is no granuloma formation noted. no malodor     Toenails 1-5 bilaterally are elongated by 2-3 mm, thickened by 2-3 mm, discolored/yellowed, dystrophic, brittle with subungual debris.    Focal hyperkeratotic lesion consisting entirely of hyperkeratotic tissue without open skin, drainage, pus, fluctuance, malodor, or signs of infection: medial IPJ of hallux concha    Interdigital Spaces clean, dry and without evidence of break in skin integrity   Psychiatric: He has a normal mood and affect. His speech is normal.   Nursing note and vitals reviewed.        Assessment:       Encounter Diagnoses   Name Primary?    Type II diabetes mellitus with neurological manifestations Yes    Onychomycosis due to dermatophyte     Corn or callus     Hammer toes of both feet     Hallux abducto valgus, left     Hallux abducto valgus, right          Plan:       Percy was seen today for diabetes mellitus and nail care.    Diagnoses and all orders for this visit:    Type II diabetes mellitus with neurological manifestations    Onychomycosis due to dermatophyte    Corn or callus    Hammer toes of both feet    Hallux abducto valgus, left    Hallux abducto valgus, right      I counseled the patient on his conditions, their implications and medical management.      - Shoe inspection. Diabetic Foot Education. Patient reminded of the importance of good nutrition and blood sugar control to help prevent podiatric complications of diabetes. Patient instructed on proper foot hygeine. We discussed wearing proper shoe gear, daily foot inspections, never walking without protective shoe gear, never putting sharp instruments to feet    - With patient's permission, nails were  aggressively reduced and debrided x 10 to their soft tissue attachment mechanically and with electric , removing all offending nail and debris. Patient relates relief following the procedure. Utilizing sterile toenail clippers I aggressively trimmed  the offending right hallux  nail border approximately 3 mm from its edge and carried the nail plate incision down at an angle in order to wedge out the offending cryptotic portion of the nail plate. The offending border was then removed in toto. The remaining nail was grinded down with an electric  down to nail bed.  Silver nitrate to any abrasions. Patient tolerated the procedure well and related significant relief.    - After cleansing the  area w/ alcohol prep pad the above mentioned hyperkeratosis was trimmed utilizing No 15 scapel, to a smooth base with out incident. Patient tolerated this  well and reported comfort to the area of medial hallux IPJ concha    - He will continue to monitor the areas daily, inspect his feet, wear protective shoe gear when ambulatory, moisturizer to maintain skin integrity and follow in this office in approximately 2-3 months, sooner PRN

## 2023-08-08 ENCOUNTER — TELEPHONE (OUTPATIENT)
Dept: FAMILY MEDICINE | Facility: CLINIC | Age: 83
End: 2023-08-08
Payer: MEDICARE

## 2023-08-08 NOTE — TELEPHONE ENCOUNTER
----- Message from Lakeshia Samson sent at 8/8/2023 12:21 PM CDT -----  Regarding: self [phone]  .Type: Patient Call Back    Who called: self    What is the request in detail: pt had a fall on Saturday and bruised on his back and under his shoulder. Pt requesting advice from doctor on what to do.    Can the clinic reply by [facility]? No    Would the patient rather a call back or a response via [facility]?call back    Best call back number [phone]

## 2023-08-08 NOTE — TELEPHONE ENCOUNTER
Patient states he is currently ok and is taking Tylenol for his back pain. Patient states he will go to the Ed if worsen. Patient scheduled to see NP for 8/10/23.

## 2023-08-10 ENCOUNTER — OFFICE VISIT (OUTPATIENT)
Dept: FAMILY MEDICINE | Facility: CLINIC | Age: 83
End: 2023-08-10
Payer: MEDICARE

## 2023-08-10 VITALS
BODY MASS INDEX: 29.57 KG/M2 | HEART RATE: 69 BPM | WEIGHT: 194.44 LBS | TEMPERATURE: 98 F | OXYGEN SATURATION: 96 % | DIASTOLIC BLOOD PRESSURE: 70 MMHG | SYSTOLIC BLOOD PRESSURE: 110 MMHG

## 2023-08-10 DIAGNOSIS — M54.50 ACUTE BILATERAL LOW BACK PAIN WITHOUT SCIATICA: ICD-10-CM

## 2023-08-10 DIAGNOSIS — F33.0 MAJOR DEPRESSIVE DISORDER, RECURRENT EPISODE, MILD: ICD-10-CM
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
## 2023-08-10 DIAGNOSIS — E11.51 TYPE 2 DIABETES, WITH PERIPHERAL CIRCULATORY DISORDER NOT AT GOAL: ICD-10-CM

## 2023-08-10 DIAGNOSIS — J43.9 COPD WITH CHRONIC BRONCHITIS AND EMPHYSEMA: ICD-10-CM

## 2023-08-10 DIAGNOSIS — M25.551 ACUTE RIGHT HIP PAIN: ICD-10-CM

## 2023-08-10 DIAGNOSIS — J44.89 COPD WITH CHRONIC BRONCHITIS AND EMPHYSEMA: ICD-10-CM

## 2023-08-10 DIAGNOSIS — I48.19 PERSISTENT ATRIAL FIBRILLATION: ICD-10-CM

## 2023-08-10 DIAGNOSIS — F41.1 ANXIETY STATE: ICD-10-CM

## 2023-08-10 PROCEDURE — 3078F PR MOST RECENT DIASTOLIC BLOOD PRESSURE < 80 MM HG: ICD-10-PCS | Mod: CPTII,S$GLB,, | Performed by: NURSE PRACTITIONER

## 2023-08-10 PROCEDURE — 3288F FALL RISK ASSESSMENT DOCD: CPT | Mod: CPTII,S$GLB,, | Performed by: NURSE PRACTITIONER

## 2023-08-10 PROCEDURE — 1100F PTFALLS ASSESS-DOCD GE2>/YR: CPT | Mod: CPTII,S$GLB,, | Performed by: NURSE PRACTITIONER

## 2023-08-10 PROCEDURE — 3074F SYST BP LT 130 MM HG: CPT | Mod: CPTII,S$GLB,, | Performed by: NURSE PRACTITIONER

## 2023-08-10 PROCEDURE — 3288F PR FALLS RISK ASSESSMENT DOCUMENTED: ICD-10-PCS | Mod: CPTII,S$GLB,, | Performed by: NURSE PRACTITIONER

## 2023-08-10 PROCEDURE — 1100F PR PT FALLS ASSESS DOC 2+ FALLS/FALL W/INJURY/YR: ICD-10-PCS | Mod: CPTII,S$GLB,, | Performed by: NURSE PRACTITIONER

## 2023-08-10 PROCEDURE — 99999 PR PBB SHADOW E&M-EST. PATIENT-LVL V: CPT | Mod: PBBFAC,,, | Performed by: NURSE PRACTITIONER

## 2023-08-10 PROCEDURE — 1125F AMNT PAIN NOTED PAIN PRSNT: CPT | Mod: CPTII,S$GLB,, | Performed by: NURSE PRACTITIONER

## 2023-08-10 PROCEDURE — 99214 OFFICE O/P EST MOD 30 MIN: CPT | Mod: S$GLB,,, | Performed by: NURSE PRACTITIONER

## 2023-08-10 PROCEDURE — 99999 PR PBB SHADOW E&M-EST. PATIENT-LVL V: ICD-10-PCS | Mod: PBBFAC,,, | Performed by: NURSE PRACTITIONER

## 2023-08-10 PROCEDURE — 1159F PR MEDICATION LIST DOCUMENTED IN MEDICAL RECORD: ICD-10-PCS | Mod: CPTII,S$GLB,, | Performed by: NURSE PRACTITIONER

## 2023-08-10 PROCEDURE — 3078F DIAST BP <80 MM HG: CPT | Mod: CPTII,S$GLB,, | Performed by: NURSE PRACTITIONER

## 2023-08-10 PROCEDURE — 1157F PR ADVANCE CARE PLAN OR EQUIV PRESENT IN MEDICAL RECORD: ICD-10-PCS | Mod: CPTII,S$GLB,, | Performed by: NURSE PRACTITIONER

## 2023-08-10 PROCEDURE — 3074F PR MOST RECENT SYSTOLIC BLOOD PRESSURE < 130 MM HG: ICD-10-PCS | Mod: CPTII,S$GLB,, | Performed by: NURSE PRACTITIONER

## 2023-08-10 PROCEDURE — 1159F MED LIST DOCD IN RCRD: CPT | Mod: CPTII,S$GLB,, | Performed by: NURSE PRACTITIONER

## 2023-08-10 PROCEDURE — 1157F ADVNC CARE PLAN IN RCRD: CPT | Mod: CPTII,S$GLB,, | Performed by: NURSE PRACTITIONER

## 2023-08-10 PROCEDURE — 1125F PR PAIN SEVERITY QUANTIFIED, PAIN PRESENT: ICD-10-PCS | Mod: CPTII,S$GLB,, | Performed by: NURSE PRACTITIONER

## 2023-08-10 PROCEDURE — 99214 PR OFFICE/OUTPT VISIT, EST, LEVL IV, 30-39 MIN: ICD-10-PCS | Mod: S$GLB,,, | Performed by: NURSE PRACTITIONER

## 2023-08-10 NOTE — PATIENT INSTRUCTIONS
Medical Fitness--[phone]  Imaging, Xray, CT, MRI, Ultrasound---[phone]  Bariatrics---[phone]  Breast Surgery---[phone]  Case Management---[phone]  Colonoscopy---[phone]  DME---[phone]  Infectious Disease---[phone]  Interventional Radiology---[phone]  Medical Records---[phone]  [facility]---[phone]  Optometry/Ophthalmology---[phone]  [facility]---[phone]  Physical Therapy---[phone]  Psychiatry---[phone] or [phone]  Plastic Surgery---[phone]  Recovery--[phone] option 2, or [phone].  Sleep Study---[phone]  Smoking Cessation---[phone]  Wound Care---[phone]  Referral Desk---[phone]

## 2023-08-10 NOTE — PROGRESS NOTES
HPI     Chief Complaint:  Chief Complaint   Patient presents with    Fall       Percy Ramirez is a 82 y.o. male with multiple medical diagnoses as listed in the medical history and problem list that presents for fall.  Pt is known to me with his his last appointment 4/12/2023.      Fall  Incident onset: 8/5/23. The fall occurred while walking (lost his balance while walking in his room trying to avoid abjects on the floor). Distance fallen: standing. Point of impact: left lower back, right arm, and right hip. Pain location: left lower back and right hip. The symptoms are aggravated by ambulation. Pertinent negatives include no bowel incontinence, fever, headaches, hearing loss, hematuria, loss of consciousness, nausea, numbness, tingling, visual change or vomiting. Treatments tried: norco. The treatment provided mild relief.     Of note he is followed by pain management for DJD, lumbar spondylosis, and spinal stenosis.      Assessment & Plan     Problem List Items Addressed This Visit          Psychiatric    Major depressive disorder, recurrent episode, mild    Reports mood is stable. Denies thoughts of self harm.      Anxiety state, unspecified    Encouraged the patient to perform self-calming techniques, such as deep breathing/relaxation techniques and exercise.      Overview     Dx updated per 2019 IMO Load            Pulmonary    COPD with chronic bronchitis and emphysema    The current medical regimen is effective;  continue present plan    His Spo2 remains 96% on 2L NC.       Overview     - Quit smoking since 1960s.   -fev1 52%  -Patient is up to date with vaccination.    -Declines pulmonary rehab  -On home oxygen which helps  -continue with Stiolto  -walker and wheelchair dependant.             Cardiac/Vascular    Persistent atrial fibrillation    The current medical regimen is effective;  continue present plan      Overview     - on coumadin  - followed by [facility]     Type 2 diabetes, with peripheral circulatory disorder not at goal     Other Visit Diagnoses       Fall, initial encounter    -  Primary    Bruising noted to right forearm, right hip, and right lumbar region associated with mild tenderness on palpation. See media.     Advised to remove clutter within the home and  objects off the floor.     -education provided on fall prevention and fall risk strategies. Discussed removing hazards, improving lighting, removing safety devices.   -Handouts provided.     What to do if you fall  Above all, try to stay calm:  If you start to fall, try to relax your body. This will reduce the impact of the fall.  After you fall, press your monitor button, or phone for help.  Don't rush to get up. First, make sure you're not hurt.  Roll onto your side, then crawl to a chair. Pull yourself up onto the chair slowly.  You should call 911 if you struck your head, lost consciousness, were confused afterward, or have any other concerns for injury.  Be sure to tell your doctor that you fell.    Notify provider if you experience the following symptoms.   Feeling lightheaded or dizzy more than once a day  Losing your balance often or feeling unsteady on your feet  Feeling numbness in your legs or feet, or noticing a change in the way you walk  Having a steady decline in your memory or mental sharpness      Continue to use walker    Refer to PT     Obtain imaging as below    Norco prn.     Discussed DDx, condition, and treatment.   Education sent to patient portal/included in after visit summary.  ED precautions given.   Notify provider if symptoms do not resolve or increase in severity.   Patient verbalizes understanding and agrees with plan of care.    Relevant Orders    X-Ray Hip 2 or 3 views Right (with Pelvis when performed)    X-Ray Lumbar Spine AP And Lateral    Ambulatory referral/consult to Physical/Occupational Therapy    Acute right hip pain        As above.     Obtain xray    Refer  to PT      Relevant Orders    X-Ray Hip 2 or 3 views Right (with Pelvis when performed)    Ambulatory referral/consult to Physical/Occupational Therapy    Acute bilateral low back pain without sciatica       As above    Denies red flag symptoms.     Refer to PT    Obtain xray     Relevant Orders    X-Ray Lumbar Spine AP And Lateral    Ambulatory referral/consult to Physical/Occupational Therapy                --------------------------------------------      Health Maintenance:  Health Maintenance         Date Due Completion Date    COVID-19 Vaccine (5 - Additional dose for Esther series) 02/06/2023 10/6/2022    TETANUS VACCINE 05/23/2023 5/23/2013    Hemoglobin A1c 07/05/2023 4/5/2023    Override on 12/9/2016: Done (A1c - 7.5)    Override on 6/6/2016: Done (7.0)    Override on 1/27/2015: Done (7.9%)    Diabetes Urine Screening 08/30/2023 8/30/2022    Influenza Vaccine (1) 09/01/2023 10/6/2022    Lipid Panel 03/08/2024 3/8/2023    Override on 6/6/2016: Done (total 104, HDL 32, LDL 57, TG 73)    Override on 1/27/2015: Done (HDL=36, TG=38, LDL=57)    Eye Exam 06/22/2024 6/22/2023    Override on 2/3/2017: Done (Dr. Arevalo)    Override on 1/22/2016: Done (Jennifer - mild bilateral diabetic retinopathy; severe bilateral dry macular degeneration)    Override on 5/8/2015: Done    Override on 10/10/2014: Done            Advised patient on the importance of completing overdue health maintenance items    Follow Up:  Follow up in about 2 weeks (around 8/24/2023), or if symptoms worsen or fail to improve.    Exam     Review of Systems:  (as noted above)  Review of Systems   Constitutional:  Negative for fever.   HENT:  Negative for trouble swallowing.    Eyes:  Negative for visual disturbance.   Respiratory:  Negative for chest tightness and shortness of breath.    Cardiovascular:  Negative for chest pain.   Gastrointestinal:  Negative for blood in stool, bowel incontinence, nausea and vomiting.   Genitourinary:  Negative  for hematuria.   Musculoskeletal:  Positive for arthralgias and myalgias.   Skin:         Bruising     Neurological:  Negative for tingling, loss of consciousness, numbness and headaches.       Physical Exam:   Physical Exam  Constitutional:       General: He is not in acute distress.     Appearance: He is not ill-appearing or diaphoretic.   HENT:      Head: Normocephalic and atraumatic.   Eyes:      General: No scleral icterus.  Cardiovascular:      Rate and Rhythm: Normal rate and regular rhythm.      Pulses: Normal pulses.      Heart sounds: No murmur heard.     No friction rub. No gallop.   Pulmonary:      Effort: No respiratory distress.   Chest:      Chest wall: No tenderness.   Musculoskeletal:         General: Tenderness (R hip, R lower back) present.      Cervical back: No rigidity.   Skin:     Capillary Refill: Capillary refill takes 2 to 3 seconds.      Findings: Bruising present.   Neurological:      General: No focal deficit present.      Mental Status: He is alert and oriented to person, place, and time.      Gait: Gait abnormal (walker).       Vitals:    08/10/23 1529 08/10/23 1558   BP: 110/70    BP Location: Right arm    Patient Position: Sitting    BP Method: Medium (Manual)    Pulse: 69    Temp: 98.1 °F (36.7 °C)    TempSrc: Oral    SpO2: (!) 93% 96%   Weight: 88.2 kg (194 lb 7.1 oz)       Body mass index is 29.57 kg/m².              History     Past Medical History:  Past Medical History:   Diagnosis Date    Allergy     Arthritis     CAD, multiple vessel     DR Melissa    Cataract     Depression     History of colon polyps     Hyperlipidemia     Hypertension     Macular degeneration     Dr Razo for injection, Jennifer for eye    S/P CABG x 2     Type 2 diabetes mellitus with circulatory disorder     Dr. Aquino       Past Surgical History:  Past Surgical History:   Procedure Laterality Date    broken elbow      left    COLONOSCOPY N/A 10/4/2017    Procedure: COLONOSCOPY;  Surgeon: Gabriel Ross  MD Xochitl;  Location: [facility] ENDO;  Service: Endoscopy;  Laterality: N/A;    EYE SURGERY      cataract    heart bypass      2004    HERNIA REPAIR      right    ROTATOR CUFF REPAIR      right  2004       Social History:  Social History     Socioeconomic History    Marital status:     Number of children: 4    Years of education: GED   Occupational History    Occupation: retired tug    Tobacco Use    Smoking status: Former     Current packs/day: 0.00     Average packs/day: 3.0 packs/day for 45.0 years (135.0 ttl pk-yrs)     Types: Cigarettes     Start date: 1959     Quit date: 2004     Years since quittin.3    Smokeless tobacco: Former   Substance and Sexual Activity    Alcohol use: Yes     Comment: was heavy drinker 35 years ago, rare use now    Drug use: No    Sexual activity: Yes     Partners: Female       Family History:  Family History   Problem Relation Age of Onset    Arthritis Mother     Diabetes Mother     Stroke Mother     Heart attack Father     Cancer Brother     Throat cancer Brother     Diabetes Maternal Aunt     Diabetes Maternal Uncle     Heart disease Maternal Uncle     Diabetes Paternal Aunt     Heart disease Paternal Aunt     Heart disease Paternal Uncle     Heart disease Maternal Grandmother     Cancer Maternal Grandfather        Allergies and Medications: (updated and reviewed)  Review of patient's allergies indicates:   Allergen Reactions    Aricept odt [donepezil] Diarrhea and Nausea And Vomiting    Codeine Nausea Only     weak    Ranexa [ranolazine]      Current Outpatient Medications   Medication Sig Dispense Refill    acetaminophen (TYLENOL) 325 MG tablet Take 2 tablets (650 mg total) by mouth every 6 (six) hours as needed. 13 tablet 0    albuterol (PROVENTIL/VENTOLIN HFA) 90 mcg/actuation inhaler INHALE 2 PUFFS BY MOUTH EVERY 6 HOURS AS NEEDED FOR WHEEZING OR  SHORTNESS  OF  BREATH 54 g 0    atorvastatin (LIPITOR) 40 MG tablet Take 40 mg by mouth once daily.       "BD INSULIN PEN NEEDLE UF SHORT 31 gauge x 5/16" Ndle       BD INSULIN SYRINGE ULTRA-FINE 1/2 mL 31 gauge x 15/64" Syrg       blood sugar diagnostic Strp To check BG 3 times daily, to use with insurance preferred meter 200 strip 11    carvedilol (COREG) 25 MG tablet Take 1 tablet (25 mg total) by mouth 2 (two) times daily. 180 tablet 0    ceramides 1,3,6-II (CERAVE DAILY MOISTURIZING) Lotn Apply twice daily to skin 355 mL 1    cinnamon bark 500 mg capsule Take 1,000 mg by mouth once daily.        clopidogreL (PLAVIX) 75 mg tablet Take 75 mg by mouth.      diclofenac sodium (VOLTAREN) 1 % Gel Apply 2 g topically 2 (two) times daily. 100 g 0    diclofenac sodium (VOLTAREN) 1 % Gel Apply 2 g topically 3 (three) times daily. 50 g 0    DULoxetine (CYMBALTA) 60 MG capsule Take 1 capsule (60 mg total) by mouth once daily. 90 capsule 1    ENTRESTO  mg per tablet Take 1 tablet by mouth 2 (two) times daily.      fluticasone propionate (FLONASE) 50 mcg/actuation nasal spray 1 spray (50 mcg total) by Each Nostril route 2 (two) times daily. 18.2 mL 3    furosemide (LASIX) 40 MG tablet Take 40 mg by mouth once daily.      gabapentin (NEURONTIN) 300 MG capsule Take 4 capsules (1,200 mg total) by mouth 2 (two) times daily. (Patient taking differently: Take 900 mg by mouth 2 (two) times daily.) 540 capsule 1    glimepiride (AMARYL) 4 MG tablet Take 4 mg by mouth daily with breakfast.       HYDROcodone-acetaminophen (NORCO) 5-325 mg per tablet Take 1 tablet by mouth every 8 (eight) hours as needed for Pain. 60 tablet 0    [START ON 8/21/2023] HYDROcodone-acetaminophen (NORCO) 5-325 mg per tablet Take 1 tablet by mouth every 8 (eight) hours as needed for Pain. 60 tablet 0    hydrOXYzine HCL (ATARAX) 25 MG tablet Take 1 tablet (25 mg total) by mouth 3 (three) times daily as needed for Itching or Anxiety. 90 tablet 3    insulin NPH-insulin regular, 70/30, (NOVOLIN 70/30) 100 unit/mL (70-30) injection Inject into the skin. Inject 10 " "units at breakfast and inject 10 units at night      insulin syringe-needle U-100 0.3 mL 31 gauge x 15/64" Syrg USE TO INJECT INSULIN THREE TIMES DAILY      insulin syringe-needle U-100 1/2 mL 31 x 5/16" Syrg       ipratropium (ATROVENT) 42 mcg (0.06 %) nasal spray 2 sprays by Nasal route every 6 (six) hours as needed for Rhinitis (use as needed for runny nose and also use 15 minutes prior to meal). Clean nose with saline prior to use 15 mL 3    isosorbide mononitrate (IMDUR) 30 MG 24 hr tablet Take 30 mg by mouth once daily.      lancets Misc To check BG 3 times daily, to use with insurance preferred meter 200 each 11    LIDOcaine (LIDODERM) 5 % Place 1 patch onto the skin once daily. Remove & Discard patch within 12 hours or as directed by MD 5 patch 1    meclizine (ANTIVERT) 12.5 mg tablet Take 1 tablet (12.5 mg total) by mouth 3 (three) times daily as needed for Dizziness. 90 tablet 0    metformin (GLUCOPHAGE) 500 MG tablet Take 1,000 mg by mouth 2 (two) times daily with meals. 2 Tablets in the morning, 2 Tablets in the evening      methocarbamoL (ROBAXIN) 500 MG Tab TAKE 1 TABLET BY MOUTH 4 TIMES DAILY FOR 10 DAYS 60 tablet 0    nitroGLYCERIN (NITROSTAT) 0.4 MG SL tablet DISSOLVE 1 TABLET UNDER THE TONGUE EVERY 5 MINUTES AS  NEEDED FOR CHEST PAIN. MAX  OF 3 TABLETS IN 15 MINUTES. CALL 911 IF PAIN PERSISTS.      pravastatin (PRAVACHOL) 20 MG tablet Take 1 tablet (20 mg total) by mouth once daily. 90 tablet 1    spironolactone (ALDACTONE) 25 MG tablet Take 25 mg by mouth.      tiotropium-olodateroL (STIOLTO RESPIMAT) 2.5-2.5 mcg/actuation Mist Inhale 1 puff into the lungs once daily. Controller 1 g 11    triamcinolone acetonide 0.1% (KENALOG) 0.1 % cream SMARTSI Application Topical 2-3 Times Daily      valsartan (DIOVAN) 80 MG tablet Take 80 mg by mouth.      VIT C/VIT E AC/LUT/COPPER/ZINC (PRESERVISION LUTEIN ORAL) Take by mouth.      warfarin (COUMADIN) 5 MG tablet Take 2 tabs by mouth on Tuesday,Thursday, " Saturday and Sundays then 1.5 on all other days.      blood-glucose meter kit To check BG 3 times daily, to use with insurance preferred meter 1 each 0     No current facility-administered medications for this visit.       Patient Care Team:  Kasie Edmondson MD as PCP - General (Internal Medicine)  Jac Rodriguez OD as Consulting Provider (Optometry)  Trever Arevalo MD as Consulting Physician (Ophthalmology)  Philippe Aquino MD as Consulting Physician (Endocrinology)  Mac Valderrama MD as Consulting Physician (Cardiology)  Marifer Romero DPM as Consulting Physician (Podiatry)  Remedios Madison LPN as Care Coordinator         - The patient is given an After Visit Summary that lists all medications with directions, allergies, education, orders placed during this encounter and follow-up instructions.      - I have reviewed the patient's medical information including past medical, family, and social history sections including the medications and allergies.      - We discussed the patient's current medications.     This note was created by combination of typed  and MModal dictation.  Transcription errors may be present.  If there are any questions, please contact me.       Juan Nunez NP

## 2023-08-11 ENCOUNTER — HOSPITAL ENCOUNTER (OUTPATIENT)
Dept: RADIOLOGY | Facility: HOSPITAL | Age: 83
Discharge: HOME | End: 2023-08-11
Attending: NURSE PRACTITIONER
Payer: MEDICARE

## 2023-08-11 DIAGNOSIS — M25.551 ACUTE RIGHT HIP PAIN: ICD-10-CM
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
## 2023-08-11 DIAGNOSIS — M54.50 ACUTE BILATERAL LOW BACK PAIN WITHOUT SCIATICA: ICD-10-CM

## 2023-08-11 PROCEDURE — 73502 X-RAY EXAM HIP UNI 2-3 VIEWS: CPT | Mod: 26,RT,, | Performed by: RADIOLOGY

## 2023-08-11 PROCEDURE — 72100 X-RAY EXAM L-S SPINE 2/3 VWS: CPT | Mod: TC,FY,PO

## 2023-08-11 PROCEDURE — 73502 X-RAY EXAM HIP UNI 2-3 VIEWS: CPT | Mod: TC,FY,PO,RT

## 2023-08-11 PROCEDURE — 72100 X-RAY EXAM L-S SPINE 2/3 VWS: CPT | Mod: 26,,, | Performed by: RADIOLOGY

## 2023-08-11 PROCEDURE — 72100 XR LUMBAR SPINE AP AND LATERAL: ICD-10-PCS | Mod: 26,,, | Performed by: RADIOLOGY

## 2023-08-11 PROCEDURE — 73502 XR HIP WITH PELVIS WHEN PERFORMED, 2 OR 3  VIEWS RIGHT: ICD-10-PCS | Mod: 26,RT,, | Performed by: RADIOLOGY

## 2023-08-14 ENCOUNTER — TELEPHONE (OUTPATIENT)
Dept: FAMILY MEDICINE | Facility: CLINIC | Age: 83
End: 2023-08-14
Payer: MEDICARE

## 2023-08-14 NOTE — TELEPHONE ENCOUNTER
Please advise,      Pt is looking to get the results to his Xrays. Pt stated that he is concerned. Please contact the patient as soon as possible.

## 2023-08-14 NOTE — TELEPHONE ENCOUNTER
----- Message from Дмитрий Bates sent at 8/14/2023  2:01 PM CDT -----  Regarding: Percy  Type:Patient Callback     Who called: Percy     What is the request in detail: Needs results     Can the clinic reply by [facility]? Yes    Would the patient rather a call back or a response via [facility]? Callback    Best call back number: [phone]      Additional Information:Pt is looking to get the results to his Xrays. Pt stated that he is concerned. Please contact the patient as soon as possible.

## 2023-08-14 NOTE — TELEPHONE ENCOUNTER
----- Message from Дмитрий Bates sent at 8/14/2023  2:01 PM CDT -----  Regarding: Percy  Type:Patient Callback     Who called: Percy     What is the request in detail: Needs results     Can the clinic reply by [facility]? Yes    Would the patient rather a call back or a response via [facility]? Callback    Best call back number: [phone]      Additional Information:Pt is looking to get the results to his Xrays. Pt stated that he is concerned. Please contact the patient as soon as possible.

## 2023-08-15 ENCOUNTER — TELEPHONE (OUTPATIENT)
Dept: FAMILY MEDICINE | Facility: CLINIC | Age: 83
End: 2023-08-15
Payer: MEDICARE

## 2023-08-15 NOTE — TELEPHONE ENCOUNTER
----- Message from Lizz Carlton sent at 8/15/2023  1:08 PM CDT -----  Contact: [phone]  Patient called, would like to know if does it matter the length of the insulin needles that he used. Please call and advise. Thank you

## 2023-08-16 ENCOUNTER — TELEPHONE (OUTPATIENT)
Dept: FAMILY MEDICINE | Facility: CLINIC | Age: 83
End: 2023-08-16
Payer: MEDICARE

## 2023-08-16 NOTE — TELEPHONE ENCOUNTER
Spoke with patient in reference to an inguinal hernia. Patient concerned that his hernia seems to be getting larger, but his pulmonologist & cardiac doctor did not recommend the surgery. Patient request a possible visit with an [facility] Urologist to possibly discuss the surgery with them. Patient informed that his provider may request an office visit with him prior to the referral to Urology.

## 2023-08-16 NOTE — TELEPHONE ENCOUNTER
Spoke with patient to give him PCP's recommendation, & it can be discussed further on next appointment with patient in October 2023.

## 2023-08-16 NOTE — TELEPHONE ENCOUNTER
----- Message from Lakeshia Samson sent at 8/16/2023  9:37 AM CDT -----  Regarding: self [phone]  .Type: Patient Call Back    Who called:self    What is the request in detail: pt requesting a call back with advice on seeing an Urologist. Patient stated he's having hernia pain.    Can the clinic reply by [facility]? no    Would the patient rather a call back or a response via [facility]? Call back    Best call back number [phone]

## 2023-08-16 NOTE — TELEPHONE ENCOUNTER
Please advise patient that urologists do not do hernia surgeries - it is done by a general surgeon.  If his pulmonologist and cardiologist do not recommend the surgery (and they are the ones that would need to clear him for the surgery) then there is no need to see a specialist regarding surgery as he would not be cleared by them.  We can discuss at his next routine scheduled office visit with me.

## 2023-08-18 ENCOUNTER — OFFICE VISIT (OUTPATIENT)
Dept: PAIN MEDICINE | Facility: CLINIC | Age: 83
End: 2023-08-18
Payer: MEDICARE

## 2023-08-18 VITALS
OXYGEN SATURATION: 96 % | HEART RATE: 75 BPM | DIASTOLIC BLOOD PRESSURE: 56 MMHG | SYSTOLIC BLOOD PRESSURE: 115 MMHG | RESPIRATION RATE: 18 BRPM

## 2023-08-18 DIAGNOSIS — M47.816 LUMBAR SPONDYLOSIS: ICD-10-CM

## 2023-08-18 DIAGNOSIS — M51.36 DDD (DEGENERATIVE DISC DISEASE), LUMBAR: ICD-10-CM

## 2023-08-18 DIAGNOSIS — M51.36 DDD (DEGENERATIVE DISC DISEASE), LUMBAR: Primary | ICD-10-CM

## 2023-08-18 DIAGNOSIS — M54.16 LUMBAR RADICULOPATHY: ICD-10-CM

## 2023-08-18 DIAGNOSIS — M48.062 SPINAL STENOSIS OF LUMBAR REGION WITH NEUROGENIC CLAUDICATION: ICD-10-CM

## 2023-08-18 PROCEDURE — 3074F PR MOST RECENT SYSTOLIC BLOOD PRESSURE < 130 MM HG: ICD-10-PCS | Mod: CPTII,S$GLB,,

## 2023-08-18 PROCEDURE — 1160F RVW MEDS BY RX/DR IN RCRD: CPT | Mod: CPTII,S$GLB,,

## 2023-08-18 PROCEDURE — 1157F ADVNC CARE PLAN IN RCRD: CPT | Mod: CPTII,S$GLB,,

## 2023-08-18 PROCEDURE — 99999 PR PBB SHADOW E&M-EST. PATIENT-LVL V: CPT | Mod: PBBFAC,,,

## 2023-08-18 PROCEDURE — 3078F DIAST BP <80 MM HG: CPT | Mod: CPTII,S$GLB,,

## 2023-08-18 PROCEDURE — 1125F AMNT PAIN NOTED PAIN PRSNT: CPT | Mod: CPTII,S$GLB,,

## 2023-08-18 PROCEDURE — 3288F PR FALLS RISK ASSESSMENT DOCUMENTED: ICD-10-PCS | Mod: CPTII,S$GLB,,

## 2023-08-18 PROCEDURE — 1101F PR PT FALLS ASSESS DOC 0-1 FALLS W/OUT INJ PAST YR: ICD-10-PCS | Mod: CPTII,S$GLB,,

## 2023-08-18 PROCEDURE — 1101F PT FALLS ASSESS-DOCD LE1/YR: CPT | Mod: CPTII,S$GLB,,

## 2023-08-18 PROCEDURE — 1160F PR REVIEW ALL MEDS BY PRESCRIBER/CLIN PHARMACIST DOCUMENTED: ICD-10-PCS | Mod: CPTII,S$GLB,,

## 2023-08-18 PROCEDURE — 3078F PR MOST RECENT DIASTOLIC BLOOD PRESSURE < 80 MM HG: ICD-10-PCS | Mod: CPTII,S$GLB,,

## 2023-08-18 PROCEDURE — 3074F SYST BP LT 130 MM HG: CPT | Mod: CPTII,S$GLB,,

## 2023-08-18 PROCEDURE — 1125F PR PAIN SEVERITY QUANTIFIED, PAIN PRESENT: ICD-10-PCS | Mod: CPTII,S$GLB,,

## 2023-08-18 PROCEDURE — 1157F PR ADVANCE CARE PLAN OR EQUIV PRESENT IN MEDICAL RECORD: ICD-10-PCS | Mod: CPTII,S$GLB,,

## 2023-08-18 PROCEDURE — 3288F FALL RISK ASSESSMENT DOCD: CPT | Mod: CPTII,S$GLB,,

## 2023-08-18 PROCEDURE — 99999 PR PBB SHADOW E&M-EST. PATIENT-LVL V: ICD-10-PCS | Mod: PBBFAC,,,

## 2023-08-18 PROCEDURE — 1159F PR MEDICATION LIST DOCUMENTED IN MEDICAL RECORD: ICD-10-PCS | Mod: CPTII,S$GLB,,

## 2023-08-18 PROCEDURE — 99214 OFFICE O/P EST MOD 30 MIN: CPT | Mod: S$GLB,,,

## 2023-08-18 PROCEDURE — 1159F MED LIST DOCD IN RCRD: CPT | Mod: CPTII,S$GLB,,

## 2023-08-18 PROCEDURE — 99214 PR OFFICE/OUTPT VISIT, EST, LEVL IV, 30-39 MIN: ICD-10-PCS | Mod: S$GLB,,,

## 2023-08-18 RX ORDER — HYDROCODONE BITARTRATE AND ACETAMINOPHEN 5; 325 MG/1; MG/1
1 TABLET ORAL EVERY 8 HOURS PRN
Qty: 60 TABLET | Refills: 0 | Status: SHIPPED | OUTPATIENT
Start: 2023-10-23 | End: 2023-09-25 | Stop reason: SDUPTHER

## 2023-08-18 RX ORDER — HYDROCODONE BITARTRATE AND ACETAMINOPHEN 5; 325 MG/1; MG/1
1 TABLET ORAL EVERY 8 HOURS PRN
Qty: 60 TABLET | Refills: 0 | Status: SHIPPED | OUTPATIENT
Start: 2023-08-24 | End: 2023-10-16 | Stop reason: ALTCHOICE

## 2023-08-18 RX ORDER — HYDROCODONE BITARTRATE AND ACETAMINOPHEN 5; 325 MG/1; MG/1
1 TABLET ORAL EVERY 8 HOURS PRN
Qty: 60 TABLET | Refills: 0 | Status: SHIPPED | OUTPATIENT
Start: 2023-09-23 | End: 2023-09-25 | Stop reason: SDUPTHER

## 2023-08-18 NOTE — PROGRESS NOTES
Subjective:     Patient ID: Percy Ramirez is a 82 y.o. male    Chief Complaint: Follow-up      Referred by: No ref. provider found      HPI:    Interval History PA (08/18/2023):  Patient returns to clinic for follow up of and medication refill.  Patient does report since previous visit he has had a incident where he fell at his home.  Occurred approximately 2 weeks ago and has been dealing with some increased pain around his lower lumbar region as well as hip.  States overall pain has been improving since incident.  Significant bruising noted midline lower lumbar spine and around his hips and arms.  Patient has been evaluated by primary care regarding this incident, imaging without evidence of fracture.  He continues to take Norco 5-325 mg q.8 hours p.r.n. #60.  States since the incident he has been taking a full pill to help manage the pain.  Continues to take 2 total pills per day with adequate relief.  Previously was cutting the pills in half and taking every 8 hours.  Denies any adverse effects from this medication.  Notes that he is scheduled to start physical therapy to help with his mobility.    Interval History PA (06/15/2023):  Patient returns to clinic for follow up and medication refill.  She denies any changes in the quality or location his pain since previous visit.  Denies any new or worsening symptoms.  Continues to take Norco 5-325 mg q.8 hours p.r.n. #60. with adequate relief, denies any adverse effects from this medication.  Continues to use with the pills in half and will occasionally take a full pill for episodes of increased pain.  Approximately 2 pills per day.    Interval History PA (03/24/2023):  Patient returns to clinic for follow up of chronic lower back and extremity pain and medication refill.  Patient denies any changes in the quality or location of his pain since previous visit.  Denies any new or worsening symptoms.  Patient was recently started on [place] 5-325 Q 8 hours p.r.n..  He  reports typically spitting the pills in half and occasionally taking a full pill with increased pain.  Notes taking approximately 2 pill total per day.  Notes adequate relief from this medication, denies any adverse effects.    Initial Encounter (2/24/23):  Percy Ramirez is a 82 y.o. male who presents today with chronic low back and lower extremity pain.  This pain has been present for years.  No specific inciting events or injuries noted.  Pain has been worsening over time.  Pain is constant but significantly worsened with standing or walking.  Can only stand or walk for short periods of time before needing to rest.  Pain is located in the midline lower lumbar spine and will radiate into the bilateral lumbar paraspinal regions and hips.  Denies any persistent numbness, tingling, weakness, bowel bladder dysfunction.  Does report that his legs feel weak after standing or walking for prolonged periods of time.  Pain improves upon sitting but still has pain when sitting.   This pain is described in detail below.    Physical Therapy:  No.    Non-pharmacologic Treatment:  Rest helps         TENS?  No    Pain Medications:         Currently taking:  Tylenol, gabapentin, Robaxin, Cymbalta, Norco 5-325 mg Q 8 hours prn    Has tried in the past:      Has not tried:  NSAIDs, TCAs, topical creams    Blood thinners:  Coumadin    Interventional Therapies:  None    Relevant Surgeries:  None    Affecting sleep?  Yes    Affecting daily activities? yes    Depressive symptoms? no          SI/HI? No    Work status: Retired    Pain Scores:    Best:       8/10  Worst:     10/10  Usually:   10/10  Today:    10/10    Pain Disability Index  Family/Home Responsibilities:: 10  Recreation:: 10  Social Activity:: 8  Occupation:: 0  Sexual Behavior:: 0  Self Care:: 8  Life-Support Activities:: 0  Pain Disability Index (PDI): 36    Review of Systems   Constitutional:  Negative for activity change, appetite change, chills, fatigue, fever and  unexpected weight change.   HENT:  Negative for hearing loss.    Eyes:  Negative for visual disturbance.   Respiratory:  Negative for chest tightness and shortness of breath.    Cardiovascular:  Negative for chest pain.   Gastrointestinal:  Negative for abdominal pain, constipation, diarrhea, nausea and vomiting.   Genitourinary:  Negative for difficulty urinating.   Musculoskeletal:  Positive for arthralgias, back pain, gait problem and myalgias. Negative for neck pain.   Skin:  Negative for rash.   Neurological:  Negative for dizziness, weakness, light-headedness, numbness and headaches.   Psychiatric/Behavioral:  Positive for sleep disturbance. Negative for hallucinations and suicidal ideas. The patient is not nervous/anxious.        Past Medical History:   Diagnosis Date    Allergy     Arthritis     CAD, multiple vessel     DR Melissa    Cataract     Depression     History of colon polyps     Hyperlipidemia     Hypertension     Macular degeneration     Dr Razo for injection, Heitraji for eye    S/P CABG x 2     Type 2 diabetes mellitus with circulatory disorder     Dr. Aquino       Past Surgical History:   Procedure Laterality Date    broken elbow      left    COLONOSCOPY N/A 10/4/2017    Procedure: COLONOSCOPY;  Surgeon: Gabriel Leung MD;  Location: [facility];  Service: Endoscopy;  Laterality: N/A;    EYE SURGERY      cataract    heart bypass      2004    HERNIA REPAIR      right    ROTATOR CUFF REPAIR      right  2004       Social History     Socioeconomic History    Marital status:     Number of children: 4    Years of education: GED   Occupational History    Occupation: retired tug    Tobacco Use    Smoking status: Former     Current packs/day: 0.00     Average packs/day: 3.0 packs/day for 45.0 years (135.0 ttl pk-yrs)     Types: Cigarettes     Start date: 1959     Quit date: 2004     Years since quittin.3    Smokeless tobacco: Former   Substance and Sexual Activity     "Alcohol use: Yes     Comment: was heavy drinker 35 years ago, rare use now    Drug use: No    Sexual activity: Yes     Partners: Female       Review of patient's allergies indicates:   Allergen Reactions    Aricept odt [donepezil] Diarrhea and Nausea And Vomiting    Codeine Nausea Only     weak    Ranexa [ranolazine]        Current Outpatient Medications on File Prior to Visit   Medication Sig Dispense Refill    acetaminophen (TYLENOL) 325 MG tablet Take 2 tablets (650 mg total) by mouth every 6 (six) hours as needed. 13 tablet 0    albuterol (PROVENTIL/VENTOLIN HFA) 90 mcg/actuation inhaler INHALE 2 PUFFS BY MOUTH EVERY 6 HOURS AS NEEDED FOR WHEEZING OR  SHORTNESS  OF  BREATH 54 g 0    atorvastatin (LIPITOR) 40 MG tablet Take 40 mg by mouth once daily.      BD INSULIN PEN NEEDLE UF SHORT 31 gauge x 5/16" Ndle       BD INSULIN SYRINGE ULTRA-FINE 1/2 mL 31 gauge x 15/64" Syrg       blood sugar diagnostic Strp To check BG 3 times daily, to use with insurance preferred meter 200 strip 11    carvedilol (COREG) 25 MG tablet Take 1 tablet (25 mg total) by mouth 2 (two) times daily. 180 tablet 0    ceramides 1,3,6-II (CERAVE DAILY MOISTURIZING) Lotn Apply twice daily to skin 355 mL 1    cinnamon bark 500 mg capsule Take 1,000 mg by mouth once daily.        clopidogreL (PLAVIX) 75 mg tablet Take 75 mg by mouth.      diclofenac sodium (VOLTAREN) 1 % Gel Apply 2 g topically 2 (two) times daily. 100 g 0    diclofenac sodium (VOLTAREN) 1 % Gel Apply 2 g topically 3 (three) times daily. 50 g 0    DULoxetine (CYMBALTA) 60 MG capsule Take 1 capsule (60 mg total) by mouth once daily. 90 capsule 1    ENTRESTO  mg per tablet Take 1 tablet by mouth 2 (two) times daily.      fluticasone propionate (FLONASE) 50 mcg/actuation nasal spray 1 spray (50 mcg total) by Each Nostril route 2 (two) times daily. 18.2 mL 3    furosemide (LASIX) 40 MG tablet Take 40 mg by mouth once daily.      gabapentin (NEURONTIN) 300 MG capsule Take 4 " "capsules (1,200 mg total) by mouth 2 (two) times daily. (Patient taking differently: Take 900 mg by mouth 2 (two) times daily.) 540 capsule 1    glimepiride (AMARYL) 4 MG tablet Take 4 mg by mouth daily with breakfast.       HYDROcodone-acetaminophen (NORCO) 5-325 mg per tablet Take 1 tablet by mouth every 8 (eight) hours as needed for Pain. 60 tablet 0    [START ON 8/21/2023] HYDROcodone-acetaminophen (NORCO) 5-325 mg per tablet Take 1 tablet by mouth every 8 (eight) hours as needed for Pain. 60 tablet 0    hydrOXYzine HCL (ATARAX) 25 MG tablet Take 1 tablet (25 mg total) by mouth 3 (three) times daily as needed for Itching or Anxiety. 90 tablet 3    insulin NPH-insulin regular, 70/30, (NOVOLIN 70/30) 100 unit/mL (70-30) injection Inject into the skin. Inject 10 units at breakfast and inject 10 units at night      insulin syringe-needle U-100 0.3 mL 31 gauge x 15/64" Syrg USE TO INJECT INSULIN THREE TIMES DAILY      insulin syringe-needle U-100 1/2 mL 31 x 5/16" Syrg       ipratropium (ATROVENT) 42 mcg (0.06 %) nasal spray 2 sprays by Nasal route every 6 (six) hours as needed for Rhinitis (use as needed for runny nose and also use 15 minutes prior to meal). Clean nose with saline prior to use 15 mL 3    isosorbide mononitrate (IMDUR) 30 MG 24 hr tablet Take 30 mg by mouth once daily.      lancets Misc To check BG 3 times daily, to use with insurance preferred meter 200 each 11    LIDOcaine (LIDODERM) 5 % Place 1 patch onto the skin once daily. Remove & Discard patch within 12 hours or as directed by MD 5 patch 1    meclizine (ANTIVERT) 12.5 mg tablet Take 1 tablet (12.5 mg total) by mouth 3 (three) times daily as needed for Dizziness. 90 tablet 0    metformin (GLUCOPHAGE) 500 MG tablet Take 1,000 mg by mouth 2 (two) times daily with meals. 2 Tablets in the morning, 2 Tablets in the evening      methocarbamoL (ROBAXIN) 500 MG Tab TAKE 1 TABLET BY MOUTH 4 TIMES DAILY FOR 10 DAYS 60 tablet 0    nitroGLYCERIN " (NITROSTAT) 0.4 MG SL tablet DISSOLVE 1 TABLET UNDER THE TONGUE EVERY 5 MINUTES AS  NEEDED FOR CHEST PAIN. MAX  OF 3 TABLETS IN 15 MINUTES. CALL 911 IF PAIN PERSISTS.      pravastatin (PRAVACHOL) 20 MG tablet Take 1 tablet (20 mg total) by mouth once daily. 90 tablet 1    spironolactone (ALDACTONE) 25 MG tablet Take 25 mg by mouth.      tiotropium-olodateroL (STIOLTO RESPIMAT) 2.5-2.5 mcg/actuation Mist Inhale 1 puff into the lungs once daily. Controller 1 g 11    triamcinolone acetonide 0.1% (KENALOG) 0.1 % cream SMARTSI Application Topical 2-3 Times Daily      valsartan (DIOVAN) 80 MG tablet Take 80 mg by mouth.      VIT C/VIT E AC/LUT/COPPER/ZINC (PRESERVISION LUTEIN ORAL) Take by mouth.      warfarin (COUMADIN) 5 MG tablet Take 2 tabs by mouth on Tuesday,Thursday, Saturday and Sundays then 1.5 on all other days.      blood-glucose meter kit To check BG 3 times daily, to use with insurance preferred meter 1 each 0     No current facility-administered medications on file prior to visit.       Objective:      BP (!) 115/56 (BP Location: Right arm, Patient Position: Sitting, BP Method: Medium (Automatic))   Pulse 75   Resp 18   SpO2 96%     Exam:  GEN:  Well developed, well nourished.  No acute distress.   HEENT:  No trauma.  Mucous membranes moist.  Nares patent bilaterally.  PSYCH: Normal affect. Thought content appropriate.  CHEST:  Breathing symmetric.  No audible wheezing.  ABD: Soft, non-distended.  SKIN:  Warm, pink, dry.  No rash on exposed areas.    EXT:  No cyanosis, clubbing, or edema.  No color change or changes in nail or hair growth.  NEURO/MUSCULOSKELETAL:  Fully alert, oriented, and appropriate. Speech normal keesha. No cranial nerve deficits.   Gait:  Antalgic.  Uses rolling walker for ambulation.  No focal motor deficits.     TTP midline lower lumbar spine, right hip  Diffuse bruising noted, midline lower lumbar spine, over the hip and on his arms.    Imaging:  Narrative &  Impression    EXAMINATION:  XR HIP WITH PELVIS WHEN PERFORMED, 2 OR 3  VIEWS RIGHT     CLINICAL HISTORY:  Unspecified fall, initial encounter     TECHNIQUE:  AP view of the pelvis and frog leg lateral view of the right hip were performed.     COMPARISON:  Non 09/21/2020 e     FINDINGS:  Mild DJD.  Left hip joint space is slightly narrowed.  No acute fracture or dislocation.  No bone destruction identified.  Vascular calcifications noted.     Impression:     See above        Electronically signed by: Trever Thompson MD  Date:                                            08/11/2023  Time:                                           12:23     Narrative & Impression    EXAMINATION:  XR LUMBAR SPINE AP AND LATERAL     CLINICAL HISTORY:  Unspecified fall, initial encounter     TECHNIQUE:  AP, lateral and spot images were performed of the lumbar spine.     COMPARISON:  10/19/2022 CT lumbar spine none     FINDINGS:  Mild DJD.  There is a grade 1 L4/L5 anterolisthesis.  The L4/L5 disc space is slightly narrowed.  No fracture or dislocation.  No bone destruction identified     Impression:     See above        Electronically signed by: Trever Thompson MD  Date:                                            08/11/2023  Time:                                           12:21     Narrative & Impression    EXAMINATION:  CT LUMBAR SPINE WITHOUT CONTRAST     CLINICAL HISTORY:  Lumbar radiculopathy, no red flags, no prior management;  Radiculopathy, lumbar region     TECHNIQUE:  Low-dose axial, sagittal and coronal reformations are obtained through the lumbar spine.  Contrast was not administered.     COMPARISON:  02/25/2015     FINDINGS:  Alignment: Grade 1 anterolisthesis at L4-L5.     Vertebrae: No fracture. No lytic or blastic lesion.     Discs: Mild disc height loss at L3-L5.     Sacroiliac joints: Mild degenerative changes.     Degenerative findings:     T12-L1: No spinal canal stenosis or neural foraminal narrowing.     L1-L2: No spinal  canal stenosis or neural foraminal narrowing.     L2-L3: Circumferential disc bulge and mild facet arthropathy resulting in mild bilateral neural foraminal narrowing.     L3-L4: Circumferential disc bulge and moderate facet arthropathy resulting in moderate to severe spinal canal stenosis and moderate bilateral neural foraminal narrowing.     L4-L5: Circumferential disc bulge and severe facet arthropathy resulting in severe spinal canal stenosis and moderate bilateral neural foraminal narrowing.     L5-S1: Circumferential disc bulge and severe facet arthropathy resulting in mild bilateral neural foraminal narrowing.     Paraspinal muscles & soft tissues: Mild paraspinal muscle atrophy.  Vascular calcifications with partially visualized ectasia of the abdominal aorta and iliac arteries.  Fibrotic changes at the lung bases.     Impression:     1. Multilevel degenerative changes of the lumbar spine detailed above.  Moderate to severe spinal canal stenosis at L4-S1.  Moderate neural foraminal narrowing at L3-L5.  2. 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        Electronically signed by: Yogesh Gamez MD  Date:                                            10/19/2022  Time:                                           09:56       Assessment:       1. DDD (degenerative disc disease), lumbar        2. Lumbar radiculopathy        3. Spinal stenosis of lumbar region with neurogenic claudication        4. Lumbar spondylosis          Plan:       Percy was seen today for follow-up.    Diagnoses and all orders for this visit:    DDD (degenerative disc disease), lumbar    Lumbar radiculopathy    Spinal stenosis of lumbar region with neurogenic claudication    Lumbar spondylosis      Percy Ramirez is a 82 y.o. male with chronic low back and lower extremity pain.  Subjectively symptoms most consistent with neurogenic claudication related to spinal stenosis.  Patient is noted to have multilevel  moderate to severe spinal stenosis on lumbar CT scan.    Prior records reviewed.   Pertinent imaging studies reviewed by me. Imaging results were discussed with patient.  Updated lumbar x-ray without acute pathology, no fractures or dislocation.  Also hip x-ray without acute fracture or dislocation.  Patient with increased pain following recent fall that occurred approximately 2 weeks ago.  Overall has had improvement and appears to be mostly myofascial injury.  We will employ watchful waiting.  Advised patient to contact our clinic if any worsening occurs.  Continue with scheduled physical therapy.  Continue Norco 5-325 mg q.8 hours p.r.n..  Three, one month supplies of 60 pills per month provided today.  Patient reportedly taking half a pill 3 times a day, occasionally will take a full pill with episodes of increased pain.  Reports typically taking approximately 2 total pills per day.  Prescription monitoring program reviewed today.  No inconsistencies noted.   Drugs of abuse blood screen completed on 06/15/2023.  Consistent with prescribed medications.  Opioid risk tool completed.  Low risk.  Pain contract signed on 03/24/2023  Dr. Santos is the provider of medication management and agrees with the plan of care.   Return to clinic in 3 months or sooner if needed.  At that time we will discuss efficacy of medications and make and necessary adjustments.       Fernando Downs PA-C  [facility]  Interventional Pain Management   08/18/2023    This note was created by combination of typed  and M-Modal dictation.  Transcription and phonetic errors may be present.  If there are any questions, please contact me.

## 2023-08-31 DIAGNOSIS — H81.392 PERIPHERAL VERTIGO INVOLVING LEFT EAR: ICD-10-CM

## 2023-08-31 RX ORDER — MECLIZINE HCL 12.5 MG 12.5 MG/1
12.5 TABLET ORAL 3 TIMES DAILY PRN
Qty: 90 TABLET | Refills: 0 | Status: SHIPPED | OUTPATIENT
Start: 2023-08-31 | End: 2023-10-16 | Stop reason: SDUPTHER

## 2023-08-31 NOTE — TELEPHONE ENCOUNTER
----- Message from Rabia Pelaez sent at 8/31/2023  8:55 AM CDT -----  Regarding: Self [phone]   Type: RX Refill Request    Who Called: Self      Have you contacted your pharmacy:  no    Refill    RX Name and Strength:meclizine (ANTIVERT) 12.5 mg tablet    Preferred Pharmacy with phone number: .  [id number]  [place] - [address] [id number] [facility]  [place] [address]  Phone: [phone] Fax: [phone]      Local or Mail Order: local     Would the patient rather a call back or a response via [facility]? Call back     Best Call Back Number: .[phone]      Additional Information: Pt stated he doesn't have any refills and he is feeling very dizzy     Thank you.

## 2023-08-31 NOTE — TELEPHONE ENCOUNTER
No care due was identified.  [facility] Embedded Care Due Messages. Reference number: [id number].   8/31/2023 9:03:41 AM CDT

## 2023-08-31 NOTE — TELEPHONE ENCOUNTER
----- Message from Rabia Pelaez sent at 8/31/2023  8:55 AM CDT -----  Regarding: Self [phone]   Type: RX Refill Request    Who Called: Self      Have you contacted your pharmacy:  no    Refill    RX Name and Strength:meclizine (ANTIVERT) 12.5 mg tablet    Preferred Pharmacy with phone number: .  [facility] [id number]  [place] - [address] [address] [facility]  [place] [address]  Phone: [phone] Fax: [phone]      Local or Mail Order: local     Would the patient rather a call back or a response via [facility]? Call back     Best Call Back Number: .[phone]      Additional Information: Pt stated he doesn't have any refills and he is feeling very dizzy     Thank you.

## 2023-09-08 ENCOUNTER — TELEPHONE (OUTPATIENT)
Dept: FAMILY MEDICINE | Facility: CLINIC | Age: 83
End: 2023-09-08
Payer: MEDICARE

## 2023-09-08 NOTE — TELEPHONE ENCOUNTER
----- Message from Graham Alba sent at 9/8/2023  1:57 PM CDT -----  Regarding: Self [phone]  Type: Patient Call Back    Who called: Self     What is the request in detail: called to talk to the doctor or nurse in regards to some questions about his vaccines. Would like a call back.     Can the clinic reply by [facility]? No     Would the patient rather a call back or a response via [facility]? Call back     Best call back number: [phone]     Additional Information:    Thank you.

## 2023-09-19 LAB
ALBUMIN CREATININE RATIO: 36 MG/G
ALBUMIN, URINE: 3.5 MG/L
CHOL/HDLC RATIO: 3.1
CHOLESTEROL, TOTAL: 95
CREATININE RANDOM URINE: 98 MG/DL (ref 20–320)
EGFR: 46 ML/MIN/1.73M2
HBA1C MFR BLD: 8.3 % (ref 4–5.6)
HDLC SERPL-MCNC: 31 MG/DL
LDLC SERPL CALC-MCNC: 45 MG/DL
NONHDLC SERPL-MCNC: 64 MG/DL
TRIGL SERPL-MCNC: 103 MG/DL

## 2023-09-25 DIAGNOSIS — M51.36 DDD (DEGENERATIVE DISC DISEASE), LUMBAR: ICD-10-CM

## 2023-09-25 DIAGNOSIS — M48.062 SPINAL STENOSIS OF LUMBAR REGION WITH NEUROGENIC CLAUDICATION: ICD-10-CM

## 2023-09-25 DIAGNOSIS — M47.816 LUMBAR SPONDYLOSIS: ICD-10-CM

## 2023-09-25 DIAGNOSIS — M54.16 LUMBAR RADICULOPATHY: ICD-10-CM

## 2023-09-25 RX ORDER — HYDROCODONE BITARTRATE AND ACETAMINOPHEN 5; 325 MG/1; MG/1
1 TABLET ORAL EVERY 8 HOURS PRN
Qty: 60 TABLET | Refills: 0 | Status: SHIPPED | OUTPATIENT
Start: 2023-09-25 | End: 2023-10-10

## 2023-09-25 RX ORDER — HYDROCODONE BITARTRATE AND ACETAMINOPHEN 5; 325 MG/1; MG/1
1 TABLET ORAL EVERY 8 HOURS PRN
Qty: 60 TABLET | Refills: 0 | Status: SHIPPED | OUTPATIENT
Start: 2023-10-25 | End: 2023-11-03 | Stop reason: SDUPTHER

## 2023-09-29 ENCOUNTER — TELEPHONE (OUTPATIENT)
Dept: FAMILY MEDICINE | Facility: CLINIC | Age: 83
End: 2023-09-29
Payer: MEDICARE

## 2023-09-29 ENCOUNTER — NURSE TRIAGE (OUTPATIENT)
Dept: ADMINISTRATIVE | Facility: CLINIC | Age: 83
End: 2023-09-29
Payer: MEDICARE

## 2023-09-29 NOTE — TELEPHONE ENCOUNTER
"Call transferred from . Patient has Vertigo, took medication, but it is not helping. It started this am, felt like he was going to fall. He states it has never been this bad. He is not home alone, his son is with him.   Took Antivert at 7 am, about an hour ago a benadryl. Has never been this bad before. "My head feels very bad ". Triage done- dispo ED. Rationale explained to patient re his statement he had never felt this bad with a vertigo attack. He has risk factors. Advised 911 if needed. States he will get his son up and get ready. Advised I would send message to provider. Verb understanding.   Reason for Disposition   [1] Dizziness (vertigo) present now AND [2] one or more STROKE RISK FACTORS (i.e., hypertension, diabetes, prior stroke/TIA, heart attack)  (Exception: Prior doctor or NP/PA evaluation for this AND no different/worse than usual.)    Additional Information   Negative: [1] Weakness (i.e., paralysis, loss of muscle strength) of the face, arm or leg on one side of the body AND [2] sudden onset AND [3] present now   Negative: [1] Numbness (i.e., loss of sensation) of the face, arm or leg on one side of the body AND [2] sudden onset AND [3] present now   Negative: [1] Loss of speech or garbled speech AND [2] sudden onset AND [3] present now   Negative: Difficult to awaken or acting confused (e.g., disoriented, slurred speech)   Negative: Sounds like a life-threatening emergency to the triager   Negative: SEVERE dizziness (vertigo) (e.g., unable to walk without assistance)   Negative: Severe headache (e.g., excruciating)   (Exception: Similar to previous migraines.)    Protocols used: Dizziness - Vertigo-A-AH    "

## 2023-09-29 NOTE — TELEPHONE ENCOUNTER
Looks like the original nurse triage recommended ER since this is the worst episode he's had. Would agree with referral to ER/to be seen today

## 2023-09-29 NOTE — TELEPHONE ENCOUNTER
"Spoke to patient. Room was spinning when he woke up this am. Questioned patient was the room still spinning & had he taken his Antivert? Patient states "Yes", he feels a little better. Informed patient that Dr. Edmondson is not in the office on today, but will be notified. Patient instructed to take his medication as prescribed and to use his adaptable equipment to ambulate. Patient has a can, walker , and a wheel chair.   "

## 2023-10-03 ENCOUNTER — PATIENT MESSAGE (OUTPATIENT)
Dept: FAMILY MEDICINE | Facility: CLINIC | Age: 83
End: 2023-10-03
Payer: MEDICARE

## 2023-10-04 ENCOUNTER — TELEPHONE (OUTPATIENT)
Dept: PULMONOLOGY | Facility: CLINIC | Age: 83
End: 2023-10-04
Payer: MEDICARE

## 2023-10-04 NOTE — TELEPHONE ENCOUNTER
Mailed appt slip to patient.                    ----- Message from Mor Li MA sent at 10/3/2023  3:00 PM CDT -----  Rabia Pelaez Staff  Caller: Unspecified (Today,  2:59 PM)  Type: Patient Call Back     What is the request in detail: Pt stated he received a letter stating its time for his 6 month check up with Dr Alexandre, schedule isnt allowing me to book, please call pt back to get scheduled.     Can the clinic reply by [facility]? No     Would the patient rather a call back or a response via [facility]? Call back     Best call back number: .[phone]       Additional Information:     Thank you.

## 2023-10-10 ENCOUNTER — PATIENT OUTREACH (OUTPATIENT)
Dept: ADMINISTRATIVE | Facility: HOSPITAL | Age: 83
End: 2023-10-10
Payer: MEDICARE

## 2023-10-10 RX ORDER — DULOXETIN HYDROCHLORIDE 30 MG/1
1 CAPSULE, DELAYED RELEASE ORAL DAILY
COMMUNITY
End: 2023-10-16 | Stop reason: ALTCHOICE

## 2023-10-10 RX ORDER — GLIMEPIRIDE 2 MG/1
1 TABLET ORAL EVERY MORNING
COMMUNITY
End: 2023-10-16 | Stop reason: ALTCHOICE

## 2023-10-16 ENCOUNTER — OFFICE VISIT (OUTPATIENT)
Dept: FAMILY MEDICINE | Facility: CLINIC | Age: 83
End: 2023-10-16
Payer: MEDICARE

## 2023-10-16 VITALS
DIASTOLIC BLOOD PRESSURE: 62 MMHG | BODY MASS INDEX: 28.94 KG/M2 | WEIGHT: 190.94 LBS | TEMPERATURE: 98 F | SYSTOLIC BLOOD PRESSURE: 110 MMHG | HEIGHT: 68 IN | HEART RATE: 67 BPM | OXYGEN SATURATION: 92 %

## 2023-10-16 DIAGNOSIS — I12.9 BENIGN HYPERTENSION WITH CHRONIC KIDNEY DISEASE, STAGE III: ICD-10-CM

## 2023-10-16 DIAGNOSIS — N18.30 BENIGN HYPERTENSION WITH CHRONIC KIDNEY DISEASE, STAGE III: ICD-10-CM

## 2023-10-16 DIAGNOSIS — G89.29 CHRONIC RIGHT-SIDED LOW BACK PAIN WITH RIGHT-SIDED SCIATICA: ICD-10-CM

## 2023-10-16 DIAGNOSIS — E78.5 HYPERLIPIDEMIA LDL GOAL <100: ICD-10-CM

## 2023-10-16 DIAGNOSIS — I70.0 ATHEROSCLEROSIS OF ABDOMINAL AORTA: ICD-10-CM

## 2023-10-16 DIAGNOSIS — I25.118 CORONARY ARTERY DISEASE OF NATIVE ARTERY OF NATIVE HEART WITH STABLE ANGINA PECTORIS: ICD-10-CM

## 2023-10-16 DIAGNOSIS — I48.19 PERSISTENT ATRIAL FIBRILLATION: ICD-10-CM

## 2023-10-16 DIAGNOSIS — M17.0 PRIMARY OSTEOARTHRITIS OF BOTH KNEES: ICD-10-CM

## 2023-10-16 DIAGNOSIS — E11.40 POORLY CONTROLLED TYPE 2 DIABETES MELLITUS WITH NEUROPATHY: ICD-10-CM

## 2023-10-16 DIAGNOSIS — I20.89 CHRONIC STABLE ANGINA: ICD-10-CM

## 2023-10-16 DIAGNOSIS — H81.392 PERIPHERAL VERTIGO INVOLVING LEFT EAR: ICD-10-CM

## 2023-10-16 DIAGNOSIS — E11.65 POORLY CONTROLLED TYPE 2 DIABETES MELLITUS WITH NEUROPATHY: ICD-10-CM

## 2023-10-16 DIAGNOSIS — E11.319 POORLY CONTROLLED TYPE 2 DIABETES MELLITUS WITH RETINOPATHY: Primary | ICD-10-CM

## 2023-10-16 DIAGNOSIS — E66.3 OVERWEIGHT (BMI 25.0-29.9): ICD-10-CM

## 2023-10-16 DIAGNOSIS — G47.33 OBSTRUCTIVE SLEEP APNEA TREATED WITH BIPAP: ICD-10-CM

## 2023-10-16 DIAGNOSIS — J84.9 ILD (INTERSTITIAL LUNG DISEASE): ICD-10-CM

## 2023-10-16 DIAGNOSIS — E11.51 TYPE 2 DIABETES, WITH PERIPHERAL CIRCULATORY DISORDER NOT AT GOAL: ICD-10-CM

## 2023-10-16 DIAGNOSIS — R42 CHRONIC VERTIGO: ICD-10-CM

## 2023-10-16 DIAGNOSIS — Z79.4 INSULIN LONG-TERM USE: ICD-10-CM

## 2023-10-16 DIAGNOSIS — Z99.89 WALKER AS AMBULATION AID: ICD-10-CM

## 2023-10-16 DIAGNOSIS — E11.65 POORLY CONTROLLED TYPE 2 DIABETES MELLITUS WITH RETINOPATHY: Primary | ICD-10-CM

## 2023-10-16 DIAGNOSIS — N25.81 SECONDARY HYPERPARATHYROIDISM OF RENAL ORIGIN: ICD-10-CM

## 2023-10-16 DIAGNOSIS — J44.89 COPD WITH CHRONIC BRONCHITIS AND EMPHYSEMA: ICD-10-CM

## 2023-10-16 DIAGNOSIS — M54.41 CHRONIC RIGHT-SIDED LOW BACK PAIN WITH RIGHT-SIDED SCIATICA: ICD-10-CM

## 2023-10-16 DIAGNOSIS — J43.9 COPD WITH CHRONIC BRONCHITIS AND EMPHYSEMA: ICD-10-CM

## 2023-10-16 PROCEDURE — 1159F MED LIST DOCD IN RCRD: CPT | Mod: CPTII,S$GLB,, | Performed by: INTERNAL MEDICINE

## 2023-10-16 PROCEDURE — 1160F PR REVIEW ALL MEDS BY PRESCRIBER/CLIN PHARMACIST DOCUMENTED: ICD-10-PCS | Mod: CPTII,S$GLB,, | Performed by: INTERNAL MEDICINE

## 2023-10-16 PROCEDURE — 1125F PR PAIN SEVERITY QUANTIFIED, PAIN PRESENT: ICD-10-PCS | Mod: CPTII,S$GLB,, | Performed by: INTERNAL MEDICINE

## 2023-10-16 PROCEDURE — 99214 PR OFFICE/OUTPT VISIT, EST, LEVL IV, 30-39 MIN: ICD-10-PCS | Mod: S$GLB,,, | Performed by: INTERNAL MEDICINE

## 2023-10-16 PROCEDURE — 1159F PR MEDICATION LIST DOCUMENTED IN MEDICAL RECORD: ICD-10-PCS | Mod: CPTII,S$GLB,, | Performed by: INTERNAL MEDICINE

## 2023-10-16 PROCEDURE — 99999 PR PBB SHADOW E&M-EST. PATIENT-LVL V: ICD-10-PCS | Mod: PBBFAC,,, | Performed by: INTERNAL MEDICINE

## 2023-10-16 PROCEDURE — 3288F PR FALLS RISK ASSESSMENT DOCUMENTED: ICD-10-PCS | Mod: CPTII,S$GLB,, | Performed by: INTERNAL MEDICINE

## 2023-10-16 PROCEDURE — 1157F PR ADVANCE CARE PLAN OR EQUIV PRESENT IN MEDICAL RECORD: ICD-10-PCS | Mod: CPTII,S$GLB,, | Performed by: INTERNAL MEDICINE

## 2023-10-16 PROCEDURE — 3074F PR MOST RECENT SYSTOLIC BLOOD PRESSURE < 130 MM HG: ICD-10-PCS | Mod: CPTII,S$GLB,, | Performed by: INTERNAL MEDICINE

## 2023-10-16 PROCEDURE — 1100F PR PT FALLS ASSESS DOC 2+ FALLS/FALL W/INJURY/YR: ICD-10-PCS | Mod: CPTII,S$GLB,, | Performed by: INTERNAL MEDICINE

## 2023-10-16 PROCEDURE — 1100F PTFALLS ASSESS-DOCD GE2>/YR: CPT | Mod: CPTII,S$GLB,, | Performed by: INTERNAL MEDICINE

## 2023-10-16 PROCEDURE — 3074F SYST BP LT 130 MM HG: CPT | Mod: CPTII,S$GLB,, | Performed by: INTERNAL MEDICINE

## 2023-10-16 PROCEDURE — 1125F AMNT PAIN NOTED PAIN PRSNT: CPT | Mod: CPTII,S$GLB,, | Performed by: INTERNAL MEDICINE

## 2023-10-16 PROCEDURE — 99999 PR PBB SHADOW E&M-EST. PATIENT-LVL V: CPT | Mod: PBBFAC,,, | Performed by: INTERNAL MEDICINE

## 2023-10-16 PROCEDURE — 1160F RVW MEDS BY RX/DR IN RCRD: CPT | Mod: CPTII,S$GLB,, | Performed by: INTERNAL MEDICINE

## 2023-10-16 PROCEDURE — 1157F ADVNC CARE PLAN IN RCRD: CPT | Mod: CPTII,S$GLB,, | Performed by: INTERNAL MEDICINE

## 2023-10-16 PROCEDURE — 3078F PR MOST RECENT DIASTOLIC BLOOD PRESSURE < 80 MM HG: ICD-10-PCS | Mod: CPTII,S$GLB,, | Performed by: INTERNAL MEDICINE

## 2023-10-16 PROCEDURE — 99214 OFFICE O/P EST MOD 30 MIN: CPT | Mod: S$GLB,,, | Performed by: INTERNAL MEDICINE

## 2023-10-16 PROCEDURE — 3288F FALL RISK ASSESSMENT DOCD: CPT | Mod: CPTII,S$GLB,, | Performed by: INTERNAL MEDICINE

## 2023-10-16 PROCEDURE — 3078F DIAST BP <80 MM HG: CPT | Mod: CPTII,S$GLB,, | Performed by: INTERNAL MEDICINE

## 2023-10-16 RX ORDER — MECLIZINE HCL 12.5 MG 12.5 MG/1
12.5 TABLET ORAL 3 TIMES DAILY PRN
Qty: 90 TABLET | Refills: 0 | Status: SHIPPED | OUTPATIENT
Start: 2023-10-16

## 2023-10-16 NOTE — PROGRESS NOTES
CHIEF COMPLAINT:   Chief Complaint   Patient presents with    Follow-up     6 month           HISTORY OF PRESENT ILLNESS:  Percy Ramirez is a 82 y.o. male who presents to the clinic today for Follow-up (6 month )  .      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  He is followed by endocrinology for his diabetes.  He does not watch his dietary intake.  He states that endocrinology recently stopped his metformin and advised him to increase his insulin.  I attempted to review all his medications with him but he is not sure of all his medications.  Was advised to bring all his medications to all his future appointments.  Blood pressures are controlled.  He denies cardiac chest pain.  He is on chronic oxygen.  He has baseline stable shortness of breath.  He has chronic dizziness.  He takes meclizine on a regular basis.    Subjective    PAST MEDICAL HISTORY:  Past Medical History:   Diagnosis Date    Allergy     Arthritis     CAD, multiple vessel     DR Melissa    Cataract     Depression     History of colon polyps     Hyperlipidemia     Hypertension     Macular degeneration     Dr Razo for injection, Jennifer for eye    S/P CABG x 2     Type 2 diabetes mellitus with circulatory disorder     Dr. Aquino       PAST SURGICAL HISTORY:  Past Surgical History:   Procedure Laterality Date    broken elbow      left    COLONOSCOPY N/A 10/4/2017    Procedure: COLONOSCOPY;  Surgeon: Gabriel Leung MD;  Location: [facility];  Service: Endoscopy;  Laterality: N/A;    EYE SURGERY      cataract    heart bypass      2004    HERNIA REPAIR      right    ROTATOR CUFF REPAIR      right  2004       SOCIAL HISTORY:  Social History     Socioeconomic History    Marital status:     Number of children: 4    Years of  "education: GED   Occupational History    Occupation: retired tug    Tobacco Use    Smoking status: Former     Current packs/day: 0.00     Average packs/day: 3.0 packs/day for 45.0 years (135.0 ttl pk-yrs)     Types: Cigarettes     Start date: 1959     Quit date: 2004     Years since quittin.5    Smokeless tobacco: Former   Substance and Sexual Activity    Alcohol use: Yes     Comment: was heavy drinker 35 years ago, rare use now    Drug use: No    Sexual activity: Yes     Partners: Female       FAMILY HISTORY:  Family History   Problem Relation Age of Onset    Arthritis Mother     Diabetes Mother     Stroke Mother     Heart attack Father     Cancer Brother     Throat cancer Brother     Diabetes Maternal Aunt     Diabetes Maternal Uncle     Heart disease Maternal Uncle     Diabetes Paternal Aunt     Heart disease Paternal Aunt     Heart disease Paternal Uncle     Heart disease Maternal Grandmother     Cancer Maternal Grandfather        ALLERGIES AND MEDICATIONS: updated and reviewed.  Review of patient's allergies indicates:   Allergen Reactions    Aricept odt [donepezil] Diarrhea and Nausea And Vomiting    Codeine Nausea Only     weak    Ranexa [ranolazine]      Medication List with Changes/Refills   Current Medications    ACETAMINOPHEN (TYLENOL) 325 MG TABLET    Take 2 tablets (650 mg total) by mouth every 6 (six) hours as needed.    ALBUTEROL (PROVENTIL/VENTOLIN HFA) 90 MCG/ACTUATION INHALER    INHALE 2 PUFFS BY MOUTH EVERY 6 HOURS AS NEEDED FOR WHEEZING OR  SHORTNESS  OF  BREATH    ATORVASTATIN (LIPITOR) 40 MG TABLET    Take 40 mg by mouth once daily.    BD INSULIN PEN NEEDLE UF SHORT 31 GAUGE X 5/16" NDLE        BD INSULIN SYRINGE ULTRA-FINE 1/2 ML 31 GAUGE X 15/64" SYRG        BLOOD SUGAR DIAGNOSTIC STRP    To check BG 3 times daily, to use with insurance preferred meter    BLOOD-GLUCOSE METER KIT    To check BG 3 times daily, to use with insurance preferred meter    CARVEDILOL (COREG) " "25 MG TABLET    Take 1 tablet (25 mg total) by mouth 2 (two) times daily.    CERAMIDES 1,3,6-II (CERAVE DAILY MOISTURIZING) LOTN    Apply twice daily to skin    CINNAMON BARK 500 MG CAPSULE    Take 1,000 mg by mouth once daily.      CLOPIDOGREL (PLAVIX) 75 MG TABLET    Take 75 mg by mouth.    DICLOFENAC SODIUM (VOLTAREN) 1 % GEL    Apply 2 g topically 2 (two) times daily.    DICLOFENAC SODIUM (VOLTAREN) 1 % GEL    Apply 2 g topically 3 (three) times daily.    DULOXETINE (CYMBALTA) 60 MG CAPSULE    Take 1 capsule (60 mg total) by mouth once daily.    ENTRESTO  MG PER TABLET    Take 1 tablet by mouth 2 (two) times daily.    FLUTICASONE PROPIONATE (FLONASE) 50 MCG/ACTUATION NASAL SPRAY    1 spray (50 mcg total) by Each Nostril route 2 (two) times daily.    FUROSEMIDE (LASIX) 40 MG TABLET    Take 40 mg by mouth once daily.    GABAPENTIN (NEURONTIN) 300 MG CAPSULE    Take 4 capsules (1,200 mg total) by mouth 2 (two) times daily.    GLIMEPIRIDE (AMARYL) 4 MG TABLET    Take 4 mg by mouth daily with breakfast.     HYDROCODONE-ACETAMINOPHEN (NORCO) 5-325 MG PER TABLET    Take 1 tablet by mouth every 8 (eight) hours as needed for Pain.    HYDROXYZINE HCL (ATARAX) 25 MG TABLET    Take 1 tablet (25 mg total) by mouth 3 (three) times daily as needed for Itching or Anxiety.    INSULIN NPH-INSULIN REGULAR, 70/30, (NOVOLIN 70/30) 100 UNIT/ML (70-30) INJECTION    Inject into the skin. Inject 10 units at breakfast and inject 10 units at night    INSULIN SYRINGE-NEEDLE U-100 0.3 ML 31 GAUGE X 15/64" SYRG    USE TO INJECT INSULIN THREE TIMES DAILY    INSULIN SYRINGE-NEEDLE U-100 1/2 ML 31 X 5/16" SYRG        IPRATROPIUM (ATROVENT) 42 MCG (0.06 %) NASAL SPRAY    2 sprays by Nasal route every 6 (six) hours as needed for Rhinitis (use as needed for runny nose and also use 15 minutes prior to meal). Clean nose with saline prior to use    ISOSORBIDE MONONITRATE (IMDUR) 30 MG 24 HR TABLET    Take 30 mg by mouth once daily.    LANCETS " MISC    To check BG 3 times daily, to use with insurance preferred meter    LIDOCAINE (LIDODERM) 5 %    Place 1 patch onto the skin once daily. Remove & Discard patch within 12 hours or as directed by MD    METHOCARBAMOL (ROBAXIN) 500 MG TAB    TAKE 1 TABLET BY MOUTH 4 TIMES DAILY FOR 10 DAYS    NITROGLYCERIN (NITROSTAT) 0.4 MG SL TABLET    DISSOLVE 1 TABLET UNDER THE TONGUE EVERY 5 MINUTES AS  NEEDED FOR CHEST PAIN. MAX  OF 3 TABLETS IN 15 MINUTES. CALL 911 IF PAIN PERSISTS.    PRAVASTATIN (PRAVACHOL) 20 MG TABLET    Take 1 tablet (20 mg total) by mouth once daily.    SPIRONOLACTONE (ALDACTONE) 25 MG TABLET    Take 25 mg by mouth.    TIOTROPIUM-OLODATEROL (STIOLTO RESPIMAT) 2.5-2.5 MCG/ACTUATION MIST    Inhale 1 puff into the lungs once daily. Controller    TRIAMCINOLONE ACETONIDE 0.1% (KENALOG) 0.1 % CREAM    SMARTSI Application Topical 2-3 Times Daily    VALSARTAN (DIOVAN) 80 MG TABLET    Take 80 mg by mouth.    VIT C/VIT E AC/LUT/COPPER/ZINC (PRESERVISION LUTEIN ORAL)    Take by mouth.    WARFARIN (COUMADIN) 5 MG TABLET    Take 2 tabs by mouth on Tuesday,Thursday, Saturday and Sundays then 1.5 on all other days.   Changed and/or Refilled Medications    Modified Medication Previous Medication    MECLIZINE (ANTIVERT) 12.5 MG TABLET meclizine (ANTIVERT) 12.5 mg tablet       Take 1 tablet (12.5 mg total) by mouth 3 (three) times daily as needed for Dizziness.    Take 1 tablet (12.5 mg total) by mouth 3 (three) times daily as needed for Dizziness.   Discontinued Medications    DULOXETINE (CYMBALTA) 30 MG CAPSULE    Take 1 capsule by mouth once daily.    GLIMEPIRIDE (AMARYL) 2 MG TABLET    Take 1 tablet by mouth every morning.    HYDROCODONE-ACETAMINOPHEN (NORCO) 5-325 MG PER TABLET    Take 1 tablet by mouth every 8 (eight) hours as needed for Pain.    HYDROCODONE-ACETAMINOPHEN (NORCO) 5-325 MG PER TABLET    Take 1 tablet by mouth every 8 (eight) hours as needed for Pain.    METFORMIN (GLUCOPHAGE) 500 MG TABLET     "Take 1,000 mg by mouth 2 (two) times daily with meals. 2 Tablets in the morning, 2 Tablets in the evening         CARE TEAM:  Patient Care Team:  Kasie Edmondson MD as PCP - General (Internal Medicine)  Jac Rodriguez OD as Consulting Provider (Optometry)  Trever Arevalo MD as Consulting Physician (Ophthalmology)  Philippe Aquino MD as Consulting Physician (Endocrinology)  Mac Valderrama MD as Consulting Physician (Cardiology)  Marifer Romero DPM as Consulting Physician (Podiatry)  Remedios Madison LPN as Care Coordinator       REVIEW OF SYSTEMS:  Review of Systems   Constitutional:  Negative for chills and fever.   HENT:  Negative for congestion.    Eyes:  Positive for visual disturbance (stable; poor vision).   Respiratory:  Positive for shortness of breath (stable; on 2L O2). Negative for cough.    Gastrointestinal:  Negative for abdominal pain.   Genitourinary:  Negative for dysuria.   Musculoskeletal:  Positive for arthralgias (especially knees).   Skin:  Negative for rash.   Neurological:  Positive for dizziness.             Objective    PHYSICAL EXAMINATION/VITALS:  Vitals:    10/16/23 0903 10/16/23 0924   BP: 110/62    Pulse: 67    Temp: 97.6 °F (36.4 °C)    TempSrc: Oral    SpO2: (!) 88%  Comment: on 2 L; with exertion (!) 92%  Comment: on 2 L   Weight: 86.6 kg (190 lb 14.7 oz)    Height: 5' 8" (1.727 m)        Body mass index is 29.03 kg/m².      General appearance - alert, well appearing, and in no distress, overweight, exam limited as patient could not get onto the examination table  Psychiatric - alert, oriented to person, place, and time, normal behavior, speech, dress, motor activity, fair memory, fair insight  Eyes - sclera anicteric  Mouth - not examined  Neck - supple, no significant adenopathy, carotids upstroke normal bilaterally, no bruits  Lymphatics - no palpable cervical lymphadenopathy  Chest - clear to auscultation, no wheezes, rales or rhonchi, symmetric air entry; " patient with portable O2  Heart - normal rate and regular rhythm  Musculoskeletal - patient noted to have Moderate osteoarthritic changes to both knee joints. No joint effusions noted. He ambulated with a rolling walker.  Extremities - pedal edema trace  Skin - normal coloration, no suspicious skin lesions      LABS:  Lab Results   Component Value Date    HGBA1C 8.3 (H) 09/19/2023    HGBA1C 8.5 (H) 06/19/2023    HGBA1C 8.0 (H) 04/05/2023      Lab Results   Component Value Date    CHOL 98 (L) 10/22/2013     Lab Results   Component Value Date    LDLCALC 45 09/19/2023    LDLCALC 40 06/19/2023    LDLCALC 81 03/08/2023               ASSESSMENT AND PLAN:   1. 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/2. Poorly controlled type 2 diabetes mellitus with neuropathy/3. Type 2 diabetes, with peripheral circulatory disorder not at goal/4. Insulin long-term use  Lab Results   Component Value Date    HGBA1C 8.3 (H) 09/19/2023     Diabetes is not controlled at this time (due to hyperglycemia) for age and comorbid conditions.   We discussed diabetic diet and regular exercise.   We discussed home blood sugar monitoring, if appropriate - the patient should test three times per day with meals and as needed.   Patient is followed by endocrinology.  Diabetic complications addressed: Neuropathy pain controlled.   Patient was counseled on the need for yearly eye exam to screen for/monitor diabetic retinopathy and yearly diabetic foot exam.  Overview:  Followed by endocrinology, Dr. Aquino at       5. Benign hypertension with chronic kidney disease, stage III  BP Readings from Last 1 Encounters:   10/16/23 110/62      Discussed sodium restriction, maintaining ideal body weight and regular exercise program as physiologic means to achieve blood pressure control. The patient will strive towards this.   The current medical regimen is effective;  continue present plan and medications. Recommended patient to check home readings  to monitor and see me for followup as scheduled or sooner as needed.   Patient was educated that both decongestant and anti-inflammatory medication may raise blood pressure.  Stable decreased kidney function. Observe. Patient counseled to avoid/minimize the use of anti-inflammatory  Medication. Discussed to stay well hydrated. Also discussed with patient that good control of blood pressure and/or diabetes, if present, will help to prevent progression.  The patient is not active on the digital hypertension program.    6. Chronic stable angina/7. Coronary artery disease of native artery of native heart with stable angina pectoris  The current medical regimen is effective;  continue present plan and medications.   Followed by: Cardiology.   Overview:  - followed by cardiology, Dr. Melissa    Overview:   s/p CABG      8. Persistent atrial fibrillation  The current medical regimen is effective;  continue present plan and medications.   Followed by: Cardiology.   Overview:  - on coumadin  - followed by [facility] [place]      9. Hyperlipidemia LDL goal <100  Lab Results   Component Value Date    CHOL 98 (L) 10/22/2013     Lab Results   Component Value Date    HDL 31 (L) 09/19/2023     Lab Results   Component Value Date    LDLCALC 45 09/19/2023     Lab Results   Component Value Date    TRIG 103 09/19/2023     Lab Results   Component Value Date    LDLCALC 45 09/19/2023     We discussed low fat diet and regular exercise.The current medical regimen is effective;  continue present plan and medications.     10. Atherosclerosis of abdominal aorta  Patient with Atherosclerosis of the Aorta.  Stable/asymptomatic. Currently stable on lipid lowering medication and blood pressure monitoring.  Overview:  - noted on ultrasound 10/25/2013      11. ILD (interstitial lung disease)  The current medical regimen is effective;  continue present plan and medications.   Followed by: Pulmonology.   Overview:  -Emphysematous changes on ct  along with basilar reticulation.  pft with moderate obstructive physiology and mild restrictive physiology.  dlco significantly reduced (pt on home oxygen)  -Monitor lung function stable fvc      12. COPD with chronic bronchitis and emphysema  The current medical regimen is effective;  continue present plan and medications.   Followed by: Pulmonology.   Overview:  - Quit smoking since 1960s.   -fev1 52%  -Patient is up to date with vaccination.    -Declines pulmonary rehab  -On home oxygen which helps  -continue with Stiolto  -walker and wheelchair dependant.       13. Secondary hyperparathyroidism of renal origin  Stable. Asymptomatic. Observe.    14. Chronic right-sided low back pain with right-sided sciatica  The current medical regimen is effective;  continue present plan and medications.   Followed by: Pain Management.     15. Obstructive sleep apnea treated with BiPAP  CPAP compliance: yes. The patient reports that they continue to benefit from regular use of their CPAP machine..  We discussed the potential ramifications of untreated sleep apnea, which could include daytime sleepiness, hypertension, heart disease including CHF, sudden death while sleeping and/or stroke. The patient was advised to abstain from driving should they feel sleepy or drowsy.  We discussed potential treatment options, which could include weight loss, body positioning, continuous positive airway pressure (CPAP), or referral for surgical consideration.   Overview:  -ahi of 31.7.  Patient is using and benefiting from bipap 16/10.         16. Overweight (BMI 25.0-29.9)  BMI Readings from Last 3 Encounters:   10/16/23 29.03 kg/m²   08/10/23 29.57 kg/m²   07/11/23 29.50 kg/m²     The patient is asked to make an attempt to improve diet and exercise patterns to aid in medical management of this problem.    17. Chronic vertigo/19. Peripheral vertigo involving left ear  The current medical regimen is effective;  continue present plan and  medications.   Overview:  Discussed use of meclizine is his primary medication to address vertigo.  Valium was prescribed for about start are under control with this medication along.  Discussed that the medication can and will cause increased balance dysfunction and possible weakness given is nature.  Patient advised that if he uses the medication he should not he is about more than necessary in order to prevent or reduce his risk of falls.  Patient voiced understanding      18. Primary osteoarthritis of both knees  The current medical regimen is effective;  continue present plan and medications.   Followed by: Orthopaedics.   Overview:  - followed by Dr. Cardenas        20. Walker as ambulation aid  We discussed fall precautions.            No orders of the defined types were placed in this encounter.     FOLLOW UP: Follow up in about 6 months (around 4/16/2024), or if symptoms worsen or fail to improve, for annual exam. or sooner as needed.

## 2023-10-18 ENCOUNTER — TELEPHONE (OUTPATIENT)
Dept: PULMONOLOGY | Facility: CLINIC | Age: 83
End: 2023-10-18
Payer: MEDICARE

## 2023-10-18 NOTE — TELEPHONE ENCOUNTER
Spoke with patient told him that he does not have a sleep study ordered.                      ----- Message from Mor Li MA sent at 10/18/2023  9:32 AM CDT -----  Shannan Mae Staff  Caller: Unspecified (Today,  9:27 AM)  .Type: Patient Call Back     Who called: self     What is the request in detail: PT is requesting to know if he's suppose to get a sleep study done     Can the clinic reply by [facility]? Call back     Would the patient rather a call back or a response via [facility]? Call back     Best call back number: .[phone]

## 2023-10-24 ENCOUNTER — OFFICE VISIT (OUTPATIENT)
Dept: PODIATRY | Facility: CLINIC | Age: 83
End: 2023-10-24
Payer: MEDICARE

## 2023-10-24 VITALS
WEIGHT: 190.94 LBS | BODY MASS INDEX: 28.94 KG/M2 | SYSTOLIC BLOOD PRESSURE: 87 MMHG | HEART RATE: 66 BPM | DIASTOLIC BLOOD PRESSURE: 46 MMHG | HEIGHT: 68 IN

## 2023-10-24 DIAGNOSIS — L84 CORN OR CALLUS: ICD-10-CM

## 2023-10-24 DIAGNOSIS — B35.1 ONYCHOMYCOSIS DUE TO DERMATOPHYTE: ICD-10-CM

## 2023-10-24 DIAGNOSIS — E11.49 TYPE II DIABETES MELLITUS WITH NEUROLOGICAL MANIFESTATIONS: Primary | ICD-10-CM

## 2023-10-24 DIAGNOSIS — M20.12 HALLUX ABDUCTO VALGUS, LEFT: ICD-10-CM

## 2023-10-24 DIAGNOSIS — M20.11 HALLUX ABDUCTO VALGUS, RIGHT: ICD-10-CM

## 2023-10-24 DIAGNOSIS — L60.0 INGROWN NAIL: ICD-10-CM

## 2023-10-24 DIAGNOSIS — M20.42 HAMMER TOES OF BOTH FEET: ICD-10-CM

## 2023-10-24 DIAGNOSIS — M20.41 HAMMER TOES OF BOTH FEET: ICD-10-CM

## 2023-10-24 PROCEDURE — 11721 DEBRIDE NAIL 6 OR MORE: CPT | Mod: 59,Q9,S$GLB, | Performed by: PODIATRIST

## 2023-10-24 PROCEDURE — 11056 PR TRIM BENIGN HYPERKERATOTIC SKIN LESION,2-4: ICD-10-PCS | Mod: Q9,S$GLB,, | Performed by: PODIATRIST

## 2023-10-24 PROCEDURE — 99999 PR PBB SHADOW E&M-EST. PATIENT-LVL IV: CPT | Mod: PBBFAC,,, | Performed by: PODIATRIST

## 2023-10-24 PROCEDURE — 11721 PR DEBRIDEMENT OF NAILS, 6 OR MORE: ICD-10-PCS | Mod: 59,Q9,S$GLB, | Performed by: PODIATRIST

## 2023-10-24 PROCEDURE — 11056 PARNG/CUTG B9 HYPRKR LES 2-4: CPT | Mod: Q9,S$GLB,, | Performed by: PODIATRIST

## 2023-10-24 PROCEDURE — 99999 PR PBB SHADOW E&M-EST. PATIENT-LVL IV: ICD-10-PCS | Mod: PBBFAC,,, | Performed by: PODIATRIST

## 2023-10-24 PROCEDURE — 99499 NO LOS: ICD-10-PCS | Mod: S$GLB,,, | Performed by: PODIATRIST

## 2023-10-24 PROCEDURE — 99499 UNLISTED E&M SERVICE: CPT | Mod: S$GLB,,, | Performed by: PODIATRIST

## 2023-10-24 NOTE — PROGRESS NOTES
Subjective:      Patient ID: Percy Ramirez is a 83 y.o. male.    Chief Complaint: Diabetes Mellitus (10/16/2023 Dr. Edmondson) and Nail Care      Percy is a 83 y.o. male who presents to the clinic for evaluation and treatment of high risk feet. Percy has a past medical history of Allergy, Arthritis, CAD, multiple vessel, Cataract, Depression, History of colon polyps, Hyperlipidemia, Hypertension, Macular degeneration, S/P CABG x 2, and Type 2 diabetes mellitus with circulatory disorder. The patient's chief complaint is elongated, thickened toenails aggravated by increased weight bearing, shoe gear, pressure.  This patient has documented high risk feet requiring routine maintenance secondary to diabetes mellitis and those secondary complications of diabetes, as mentioned..    PCP: Kasie Edmondson MD    Date Last Seen by PCP:   Chief Complaint   Patient presents with    Diabetes Mellitus     10/16/2023 Dr. Edmondson    Nail Care     Current shoe gear:  Velcro tennis shoes. worn    Hemoglobin A1C   Date Value Ref Range Status   09/19/2023 8.3 (H) 4.0 - 5.6 % Final     Comment:     [facility]   06/19/2023 8.5 (H) 4.0 - 5.6 % Final     Comment:     [facility]   04/05/2023 8.0 (H) 4.0 - 5.6 % Final     Comment:     ADA Screening Guidelines:  5.7-6.4%  Consistent with prediabetes  >or=6.5%  Consistent with diabetes    High levels of fetal hemoglobin interfere with the HbA1C  assay. Heterozygous hemoglobin variants (HbS, HgC, etc)do  not significantly interfere with this assay.   However, presence of multiple variants may affect accuracy.     03/08/2023 8.0 (H) 4.0 - 5.6 % Final     Comment:     [facility]     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    Coronary artery disease    S/P CABG x 2    Macular degeneration    Obstructive sleep apnea  "treated with BiPAP    Anxiety state, unspecified    Insulin long-term use    Primary osteoarthritis of both knees    Chronic low back pain    DJD (degenerative joint disease), lumbar    Poorly controlled type 2 diabetes mellitus with neuropathy    Bilateral carotid artery disease    Hyperlipidemia LDL goal <100    Type 2 diabetes, with peripheral circulatory disorder not at goal    COPD with chronic bronchitis and emphysema    Atherosclerosis of abdominal aorta    Overweight (BMI 25.0-29.9)    Persistent atrial fibrillation    Chronic stable angina    Senile purpura    Goals of care, counseling/discussion    Osteoarthritis of carpometacarpal (CMC) joint of left thumb    MCI (mild cognitive impairment)    Chronic vertigo    Pulmonary nodule    Dyspnea on exertion    Chronic combined systolic and diastolic heart failure    Walker as ambulation aid    ILD (interstitial lung disease)    History of cardioversion    Erectile dysfunction    Secondary hyperparathyroidism of renal origin    Noncompliance with medication regimen    Exudative age-related macular degeneration, right eye, with active choroidal neovascularization    History of stroke    Chronic tension-type headache, intractable    Spinal stenosis of lumbar region with neurogenic claudication    Lumbar radiculopathy    Lumbar spondylosis    DDD (degenerative disc disease), lumbar     Current Outpatient Medications on File Prior to Visit   Medication Sig Dispense Refill    acetaminophen (TYLENOL) 325 MG tablet Take 2 tablets (650 mg total) by mouth every 6 (six) hours as needed. 13 tablet 0    albuterol (PROVENTIL/VENTOLIN HFA) 90 mcg/actuation inhaler INHALE 2 PUFFS BY MOUTH EVERY 6 HOURS AS NEEDED FOR WHEEZING OR  SHORTNESS  OF  BREATH 54 g 0    atorvastatin (LIPITOR) 40 MG tablet Take 40 mg by mouth once daily.      BD INSULIN PEN NEEDLE UF SHORT 31 gauge x 5/16" Ndle       BD INSULIN SYRINGE ULTRA-FINE 1/2 mL 31 gauge x 15/64" Syrg       blood sugar diagnostic " "Strp To check BG 3 times daily, to use with insurance preferred meter 200 strip 11    carvedilol (COREG) 25 MG tablet Take 1 tablet (25 mg total) by mouth 2 (two) times daily. 180 tablet 0    ceramides 1,3,6-II (CERAVE DAILY MOISTURIZING) Lotn Apply twice daily to skin 355 mL 1    cinnamon bark 500 mg capsule Take 1,000 mg by mouth once daily.        clopidogreL (PLAVIX) 75 mg tablet Take 75 mg by mouth.      diclofenac sodium (VOLTAREN) 1 % Gel Apply 2 g topically 2 (two) times daily. 100 g 0    diclofenac sodium (VOLTAREN) 1 % Gel Apply 2 g topically 3 (three) times daily. 50 g 0    DULoxetine (CYMBALTA) 60 MG capsule Take 1 capsule (60 mg total) by mouth once daily. 90 capsule 1    ENTRESTO  mg per tablet Take 1 tablet by mouth 2 (two) times daily.      fluticasone propionate (FLONASE) 50 mcg/actuation nasal spray 1 spray (50 mcg total) by Each Nostril route 2 (two) times daily. 18.2 mL 3    furosemide (LASIX) 40 MG tablet Take 40 mg by mouth once daily.      gabapentin (NEURONTIN) 300 MG capsule Take 4 capsules (1,200 mg total) by mouth 2 (two) times daily. (Patient taking differently: Take 900 mg by mouth 2 (two) times daily.) 540 capsule 1    glimepiride (AMARYL) 4 MG tablet Take 4 mg by mouth daily with breakfast.       [START ON 10/25/2023] HYDROcodone-acetaminophen (NORCO) 5-325 mg per tablet Take 1 tablet by mouth every 8 (eight) hours as needed for Pain. 60 tablet 0    hydrOXYzine HCL (ATARAX) 25 MG tablet Take 1 tablet (25 mg total) by mouth 3 (three) times daily as needed for Itching or Anxiety. 90 tablet 3    insulin NPH-insulin regular, 70/30, (NOVOLIN 70/30) 100 unit/mL (70-30) injection Inject into the skin. Inject 10 units at breakfast and inject 10 units at night      insulin syringe-needle U-100 0.3 mL 31 gauge x 15/64" Syrg USE TO INJECT INSULIN THREE TIMES DAILY      insulin syringe-needle U-100 1/2 mL 31 x 5/16" Syrg       ipratropium (ATROVENT) 42 mcg (0.06 %) nasal spray 2 sprays by " Nasal route every 6 (six) hours as needed for Rhinitis (use as needed for runny nose and also use 15 minutes prior to meal). Clean nose with saline prior to use 15 mL 3    isosorbide mononitrate (IMDUR) 30 MG 24 hr tablet Take 30 mg by mouth once daily.      lancets Misc To check BG 3 times daily, to use with insurance preferred meter 200 each 11    LIDOcaine (LIDODERM) 5 % Place 1 patch onto the skin once daily. Remove & Discard patch within 12 hours or as directed by MD Townsend patch 1    meclizine (ANTIVERT) 12.5 mg tablet Take 1 tablet (12.5 mg total) by mouth 3 (three) times daily as needed for Dizziness. 90 tablet 0    methocarbamoL (ROBAXIN) 500 MG Tab TAKE 1 TABLET BY MOUTH 4 TIMES DAILY FOR 10 DAYS 60 tablet 0    nitroGLYCERIN (NITROSTAT) 0.4 MG SL tablet DISSOLVE 1 TABLET UNDER THE TONGUE EVERY 5 MINUTES AS  NEEDED FOR CHEST PAIN. MAX  OF 3 TABLETS IN 15 MINUTES. CALL 911 IF PAIN PERSISTS.      pravastatin (PRAVACHOL) 20 MG tablet Take 1 tablet (20 mg total) by mouth once daily. 90 tablet 1    spironolactone (ALDACTONE) 25 MG tablet Take 25 mg by mouth.      tiotropium-olodateroL (STIOLTO RESPIMAT) 2.5-2.5 mcg/actuation Mist Inhale 1 puff into the lungs once daily. Controller 1 g 11    triamcinolone acetonide 0.1% (KENALOG) 0.1 % cream SMARTSI Application Topical 2-3 Times Daily      valsartan (DIOVAN) 80 MG tablet Take 80 mg by mouth.      VIT C/VIT E AC/LUT/COPPER/ZINC (PRESERVISION LUTEIN ORAL) Take by mouth.      warfarin (COUMADIN) 5 MG tablet Take 2 tabs by mouth on Tuesday,Thursday, Saturday and Sundays then 1.5 on all other days.      blood-glucose meter kit To check BG 3 times daily, to use with insurance preferred meter 1 each 0     No current facility-administered medications on file prior to visit.     Review of patient's allergies indicates:   Allergen Reactions    Codeine Nausea Only     weak     Past Surgical History:   Procedure Laterality Date    broken elbow      left    COLONOSCOPY N/A  10/4/2017    Procedure: COLONOSCOPY;  Surgeon: Gabriel Leung MD;  Location: [facility];  Service: Endoscopy;  Laterality: N/A;    EYE SURGERY      cataract    heart bypass      2004    HERNIA REPAIR      right    ROTATOR CUFF REPAIR      right  2004     Family History   Problem Relation Age of Onset    Arthritis Mother     Diabetes Mother     Stroke Mother     Heart attack Father     Cancer Brother     Throat cancer Brother     Diabetes Maternal Aunt     Diabetes Maternal Uncle     Heart disease Maternal Uncle     Diabetes Paternal Aunt     Heart disease Paternal Aunt     Heart disease Paternal Uncle     Heart disease Maternal Grandmother     Cancer Maternal Grandfather      Social History     Socioeconomic History    Marital status:     Number of children: 4    Years of education: GED   Occupational History    Occupation: retired tug    Tobacco Use    Smoking status: Former     Current packs/day: 0.00     Average packs/day: 3.0 packs/day for 45.0 years (135.0 ttl pk-yrs)     Types: Cigarettes     Start date: 1959     Quit date: 2004     Years since quittin.5    Smokeless tobacco: Former   Substance and Sexual Activity    Alcohol use: Yes     Comment: was heavy drinker 35 years ago, rare use now    Drug use: No    Sexual activity: Yes     Partners: Female     Review of Systems   Constitution: Negative for chills, fever and weakness.   Cardiovascular: Negative for claudication and leg swelling.   Respiratory: Positive for cough. Negative for shortness of breath.    Skin: Positive for dry skin and nail changes. Negative for itching and rash.   Musculoskeletal: Positive for arthritis, back pain and joint pain. Negative for falls, joint swelling and muscle weakness.   Gastrointestinal: Negative for diarrhea, nausea and vomiting.   Neurological: Positive for numbness and paresthesias. Negative for tremors.   Psychiatric/Behavioral: Negative for altered mental status and hallucinations.  "          Objective:       Vitals:    10/24/23 0843   BP: (!) 87/46   Pulse: 66   Weight: 86.6 kg (190 lb 14.7 oz)   Height: 5' 8" (1.727 m)   PainSc: 0-No pain       Physical Exam   Constitutional:   General: Pt. is well-developed, well-nourished, appears stated age, in no acute distress, alert and oriented x 3. No evidence of depression, anxiety, or agitation. Calm, cooperative, and communicative. Appropriate interactions and affect.       Cardiovascular:   Pulses:       Dorsalis pedis pulses are 2+ on the right side, and 2+ on the left side.        Posterior tibial pulses are 1+ on the right side, and 1+ on the left side.   There is decreased digital hair.   Musculoskeletal:        Right foot: There is normal range of motion.        Left foot:  There is normal range of motion.   Muscle strength is 5/5 in all groups bilaterally.    Decreased stride, station of gait.  apropulsive toe off.  Increased angle and base of gait.    Patient has hammertoes of digits 2-5 bilateral partially reducible without symptom today.    Visible and palpable bunion without pain at dorsomedial 1st metatarsal head right and left.  Hallux abducted right and left partially reducible, tracks laterally without being track bound.  No ecchymosis, erythema, edema, or cardinal signs infection or signs of trauma same foot.     Neurological: A sensory deficit is present.   [place] 5.07 monofilamant testing is diminished Farhad feet. Sharp/dull sensation diminished Bilaterally   Skin: Skin is warm, dry. No abrasion, no ecchymosis, no rash noted. He is not diaphoretic. No cyanosis. No pallor. Nails show no clubbing.   Medial and lateral hallux nail margin of right foot with ingrown nail plate and thick HPK. Surrounding erythema and minimal edema is noted there is no granuloma formation noted. no malodor     Toenails 1-5 bilaterally are elongated by 2-3 mm, thickened by 2-3 mm, discolored/yellowed, dystrophic, brittle with subungual " debris.    Focal hyperkeratotic lesion consisting entirely of hyperkeratotic tissue without open skin, drainage, pus, fluctuance, malodor, or signs of infection: medial IPJ of hallux concha    Interdigital Spaces clean, dry and without evidence of break in skin integrity   Psychiatric: He has a normal mood and affect. His speech is normal.   Nursing note and vitals reviewed.        Assessment:       Encounter Diagnoses   Name Primary?    Type II diabetes mellitus with neurological manifestations Yes    Onychomycosis due to dermatophyte     Ingrown nail     Corn or callus     Hammer toes of both feet     Hallux abducto valgus, left     Hallux abducto valgus, right          Plan:       Percy was seen today for diabetes mellitus and nail care.    Diagnoses and all orders for this visit:    Type II diabetes mellitus with neurological manifestations    Onychomycosis due to dermatophyte    Ingrown nail    Corn or callus    Hammer toes of both feet    Hallux abducto valgus, left    Hallux abducto valgus, right      I counseled the patient on his conditions, their implications and medical management.      - Shoe inspection. Diabetic Foot Education. Patient reminded of the importance of good nutrition and blood sugar control to help prevent podiatric complications of diabetes. Patient instructed on proper foot hygeine. We discussed wearing proper shoe gear, daily foot inspections, never walking without protective shoe gear, never putting sharp instruments to feet    - With patient's permission, nails were aggressively reduced and debrided x 10 to their soft tissue attachment mechanically and with electric , removing all offending nail and debris. Patient relates relief following the procedure. Utilizing sterile toenail clippers I aggressively trimmed  the offending right hallux  nail border approximately 3 mm from its edge and carried the nail plate incision down at an angle in order to wedge out the offending cryptotic  portion of the nail plate. The offending border was then removed in toto. The remaining nail was grinded down with an electric  down to nail bed.  Silver nitrate to any abrasions. Patient tolerated the procedure well and related significant relief.    - After cleansing the  area w/ alcohol prep pad the above mentioned hyperkeratosis was trimmed utilizing No 15 scapel, to a smooth base with out incident. Patient tolerated this  well and reported comfort to the area of medial hallux IPJ concha    - He will continue to monitor the areas daily, inspect his feet, wear protective shoe gear when ambulatory, moisturizer to maintain skin integrity and follow in this office in approximately 2-3 months, sooner PRN

## 2023-11-03 ENCOUNTER — OFFICE VISIT (OUTPATIENT)
Dept: PAIN MEDICINE | Facility: CLINIC | Age: 83
End: 2023-11-03
Payer: MEDICARE

## 2023-11-03 VITALS
HEART RATE: 69 BPM | RESPIRATION RATE: 16 BRPM | DIASTOLIC BLOOD PRESSURE: 59 MMHG | SYSTOLIC BLOOD PRESSURE: 107 MMHG | OXYGEN SATURATION: 92 %

## 2023-11-03 DIAGNOSIS — M48.062 SPINAL STENOSIS OF LUMBAR REGION WITH NEUROGENIC CLAUDICATION: ICD-10-CM

## 2023-11-03 DIAGNOSIS — M51.36 DDD (DEGENERATIVE DISC DISEASE), LUMBAR: Primary | ICD-10-CM

## 2023-11-03 DIAGNOSIS — M54.16 LUMBAR RADICULOPATHY: ICD-10-CM

## 2023-11-03 DIAGNOSIS — M47.816 LUMBAR SPONDYLOSIS: ICD-10-CM

## 2023-11-03 DIAGNOSIS — M51.36 DDD (DEGENERATIVE DISC DISEASE), LUMBAR: ICD-10-CM

## 2023-11-03 PROCEDURE — 3288F FALL RISK ASSESSMENT DOCD: CPT | Mod: CPTII,S$GLB,,

## 2023-11-03 PROCEDURE — 99999 PR PBB SHADOW E&M-EST. PATIENT-LVL IV: ICD-10-PCS | Mod: PBBFAC,,,

## 2023-11-03 PROCEDURE — 1157F ADVNC CARE PLAN IN RCRD: CPT | Mod: CPTII,S$GLB,,

## 2023-11-03 PROCEDURE — 1157F PR ADVANCE CARE PLAN OR EQUIV PRESENT IN MEDICAL RECORD: ICD-10-PCS | Mod: CPTII,S$GLB,,

## 2023-11-03 PROCEDURE — 3074F SYST BP LT 130 MM HG: CPT | Mod: CPTII,S$GLB,,

## 2023-11-03 PROCEDURE — 1159F PR MEDICATION LIST DOCUMENTED IN MEDICAL RECORD: ICD-10-PCS | Mod: CPTII,S$GLB,,

## 2023-11-03 PROCEDURE — 3078F DIAST BP <80 MM HG: CPT | Mod: CPTII,S$GLB,,

## 2023-11-03 PROCEDURE — 99999 PR PBB SHADOW E&M-EST. PATIENT-LVL IV: CPT | Mod: PBBFAC,,,

## 2023-11-03 PROCEDURE — 3288F PR FALLS RISK ASSESSMENT DOCUMENTED: ICD-10-PCS | Mod: CPTII,S$GLB,,

## 2023-11-03 PROCEDURE — 99214 OFFICE O/P EST MOD 30 MIN: CPT | Mod: S$GLB,,,

## 2023-11-03 PROCEDURE — 1160F PR REVIEW ALL MEDS BY PRESCRIBER/CLIN PHARMACIST DOCUMENTED: ICD-10-PCS | Mod: CPTII,S$GLB,,

## 2023-11-03 PROCEDURE — 1101F PR PT FALLS ASSESS DOC 0-1 FALLS W/OUT INJ PAST YR: ICD-10-PCS | Mod: CPTII,S$GLB,,

## 2023-11-03 PROCEDURE — 3074F PR MOST RECENT SYSTOLIC BLOOD PRESSURE < 130 MM HG: ICD-10-PCS | Mod: CPTII,S$GLB,,

## 2023-11-03 PROCEDURE — 1159F MED LIST DOCD IN RCRD: CPT | Mod: CPTII,S$GLB,,

## 2023-11-03 PROCEDURE — 1125F PR PAIN SEVERITY QUANTIFIED, PAIN PRESENT: ICD-10-PCS | Mod: CPTII,S$GLB,,

## 2023-11-03 PROCEDURE — 1160F RVW MEDS BY RX/DR IN RCRD: CPT | Mod: CPTII,S$GLB,,

## 2023-11-03 PROCEDURE — 3078F PR MOST RECENT DIASTOLIC BLOOD PRESSURE < 80 MM HG: ICD-10-PCS | Mod: CPTII,S$GLB,,

## 2023-11-03 PROCEDURE — 1101F PT FALLS ASSESS-DOCD LE1/YR: CPT | Mod: CPTII,S$GLB,,

## 2023-11-03 PROCEDURE — 1125F AMNT PAIN NOTED PAIN PRSNT: CPT | Mod: CPTII,S$GLB,,

## 2023-11-03 PROCEDURE — 99214 PR OFFICE/OUTPT VISIT, EST, LEVL IV, 30-39 MIN: ICD-10-PCS | Mod: S$GLB,,,

## 2023-11-03 RX ORDER — HYDROCODONE BITARTRATE AND ACETAMINOPHEN 5; 325 MG/1; MG/1
1 TABLET ORAL EVERY 8 HOURS PRN
Qty: 60 TABLET | Refills: 0 | Status: SHIPPED | OUTPATIENT
Start: 2023-12-25 | End: 2024-01-17 | Stop reason: SDUPTHER

## 2023-11-03 RX ORDER — HYDROCODONE BITARTRATE AND ACETAMINOPHEN 5; 325 MG/1; MG/1
1 TABLET ORAL EVERY 8 HOURS PRN
Qty: 60 TABLET | Refills: 0 | Status: SHIPPED | OUTPATIENT
Start: 2023-11-03 | End: 2023-12-03

## 2023-11-03 RX ORDER — HYDROCODONE BITARTRATE AND ACETAMINOPHEN 5; 325 MG/1; MG/1
1 TABLET ORAL EVERY 8 HOURS PRN
Qty: 60 TABLET | Refills: 0 | Status: SHIPPED | OUTPATIENT
Start: 2023-11-25 | End: 2023-12-25

## 2023-11-03 NOTE — PROGRESS NOTES
Subjective:     Patient ID: Percy Ramirez is a 83 y.o. male    Chief Complaint: Follow-up (3mo med mgmt )      Referred by: No ref. provider found      HPI:    Interval History PA (11/03/2023):  Patient returns to clinic for follow up and medication refill.  Patient denies any changes in the quality or location of his pain since previous visit.  Patient does report some worsening of pain overall.  Notes specifically worsening of midline lower back pain with prolonged activity.  Notes that he continues to take Norco 5-325 mg q.8 hours p.r.n. #60.  Typically splitting the pills in half and taking every 8 hours.  However due to recently increased pain he has been taking a full pill to help manage his pain.  Reporting adequate relief.  Denies any adverse effects from this medication.    Interval History PA (08/18/2023):  Patient returns to clinic for follow up of and medication refill.  Patient does report since previous visit he has had a incident where he fell at his home.  Occurred approximately 2 weeks ago and has been dealing with some increased pain around his lower lumbar region as well as hip.  States overall pain has been improving since incident.  Significant bruising noted midline lower lumbar spine and around his hips and arms.  Patient has been evaluated by primary care regarding this incident, imaging without evidence of fracture.  He continues to take Norco 5-325 mg q.8 hours p.r.n. #60.  States since the incident he has been taking a full pill to help manage the pain.  Continues to take 2 total pills per day with adequate relief.  Previously was cutting the pills in half and taking every 8 hours.  Denies any adverse effects from this medication.  Notes that he is scheduled to start physical therapy to help with his mobility.    Interval History PA (06/15/2023):  Patient returns to clinic for follow up and medication refill.  She denies any changes in the quality or location his pain since previous  visit.  Denies any new or worsening symptoms.  Continues to take Norco 5-325 mg q.8 hours p.r.n. #60. with adequate relief, denies any adverse effects from this medication.  Continues to use with the pills in half and will occasionally take a full pill for episodes of increased pain.  Approximately 2 pills per day.    Interval History PA (03/24/2023):  Patient returns to clinic for follow up of chronic lower back and extremity pain and medication refill.  Patient denies any changes in the quality or location of his pain since previous visit.  Denies any new or worsening symptoms.  Patient was recently started on [place] 5-325 Q 8 hours p.r.n..  He reports typically spitting the pills in half and occasionally taking a full pill with increased pain.  Notes taking approximately 2 pill total per day.  Notes adequate relief from this medication, denies any adverse effects.    Initial Encounter (2/24/23):  Percy Ramirez is a 83 y.o. male who presents today with chronic low back and lower extremity pain.  This pain has been present for years.  No specific inciting events or injuries noted.  Pain has been worsening over time.  Pain is constant but significantly worsened with standing or walking.  Can only stand or walk for short periods of time before needing to rest.  Pain is located in the midline lower lumbar spine and will radiate into the bilateral lumbar paraspinal regions and hips.  Denies any persistent numbness, tingling, weakness, bowel bladder dysfunction.  Does report that his legs feel weak after standing or walking for prolonged periods of time.  Pain improves upon sitting but still has pain when sitting.   This pain is described in detail below.    Physical Therapy:  No.    Non-pharmacologic Treatment:  Rest helps         TENS?  No    Pain Medications:         Currently taking:  Tylenol, gabapentin, Robaxin, Cymbalta, Norco 5-325 mg Q 8 hours prn    Has tried in the past:      Has not tried:  NSAIDs, TCAs,  topical creams    Blood thinners:  Coumadin    Interventional Therapies:  None    Relevant Surgeries:  None    Affecting sleep?  Yes    Affecting daily activities? yes    Depressive symptoms? no          SI/HI? No    Work status: Retired    Pain Scores:    Best:       5/10  Worst:     10/10  Usually:   7/10  Today:    10/10    Pain Disability Index  Family/Home Responsibilities:: 5  Recreation:: 7  Social Activity:: 2  Self Care:: 10  Life-Support Activities:: 5    Review of Systems   Constitutional:  Negative for activity change, appetite change, chills, fatigue, fever and unexpected weight change.   HENT:  Negative for hearing loss.    Eyes:  Negative for visual disturbance.   Respiratory:  Negative for chest tightness and shortness of breath.    Cardiovascular:  Negative for chest pain.   Gastrointestinal:  Negative for abdominal pain, constipation, diarrhea, nausea and vomiting.   Genitourinary:  Negative for difficulty urinating.   Musculoskeletal:  Positive for arthralgias, back pain, gait problem and myalgias. Negative for neck pain.   Skin:  Negative for rash.   Neurological:  Negative for dizziness, weakness, light-headedness, numbness and headaches.   Psychiatric/Behavioral:  Positive for sleep disturbance. Negative for hallucinations and suicidal ideas. The patient is not nervous/anxious.        Past Medical History:   Diagnosis Date    Allergy     Arthritis     CAD, multiple vessel     DR Melissa    Cataract     Depression     History of colon polyps     Hyperlipidemia     Hypertension     Macular degeneration     Dr Razo for injection, Jennifer for eye    S/P CABG x 2     Type 2 diabetes mellitus with circulatory disorder     Dr. Aquino       Past Surgical History:   Procedure Laterality Date    broken elbow      left    COLONOSCOPY N/A 10/4/2017    Procedure: COLONOSCOPY;  Surgeon: Gabriel Leung MD;  Location: [facility];  Service: Endoscopy;  Laterality: N/A;    EYE SURGERY      cataract    heart  "bypass      2004    HERNIA REPAIR      right    ROTATOR CUFF REPAIR      right  2004       Social History     Socioeconomic History    Marital status:     Number of children: 4    Years of education: GED   Occupational History    Occupation: retired tug    Tobacco Use    Smoking status: Former     Current packs/day: 0.00     Average packs/day: 3.0 packs/day for 45.0 years (135.0 ttl pk-yrs)     Types: Cigarettes     Start date: 1959     Quit date: 2004     Years since quittin.5    Smokeless tobacco: Former   Substance and Sexual Activity    Alcohol use: Yes     Comment: was heavy drinker 35 years ago, rare use now    Drug use: No    Sexual activity: Yes     Partners: Female       Review of patient's allergies indicates:   Allergen Reactions    Aricept odt [donepezil] Diarrhea and Nausea And Vomiting    Codeine Nausea Only     weak    Ranexa [ranolazine]        Current Outpatient Medications on File Prior to Visit   Medication Sig Dispense Refill    acetaminophen (TYLENOL) 325 MG tablet Take 2 tablets (650 mg total) by mouth every 6 (six) hours as needed. 13 tablet 0    albuterol (PROVENTIL/VENTOLIN HFA) 90 mcg/actuation inhaler INHALE 2 PUFFS BY MOUTH EVERY 6 HOURS AS NEEDED FOR WHEEZING OR  SHORTNESS  OF  BREATH 54 g 0    atorvastatin (LIPITOR) 40 MG tablet Take 40 mg by mouth once daily.      BD INSULIN PEN NEEDLE UF SHORT 31 gauge x 5/16" Ndle       BD INSULIN SYRINGE ULTRA-FINE 1/2 mL 31 gauge x 15/64" Syrg       blood sugar diagnostic Strp To check BG 3 times daily, to use with insurance preferred meter 200 strip 11    carvedilol (COREG) 25 MG tablet Take 1 tablet (25 mg total) by mouth 2 (two) times daily. 180 tablet 0    ceramides 1,3,6-II (CERAVE DAILY MOISTURIZING) Lotn Apply twice daily to skin 355 mL 1    cinnamon bark 500 mg capsule Take 1,000 mg by mouth once daily.        clopidogreL (PLAVIX) 75 mg tablet Take 75 mg by mouth.      diclofenac sodium (VOLTAREN) 1 % Gel " "Apply 2 g topically 2 (two) times daily. 100 g 0    diclofenac sodium (VOLTAREN) 1 % Gel Apply 2 g topically 3 (three) times daily. 50 g 0    DULoxetine (CYMBALTA) 60 MG capsule Take 1 capsule (60 mg total) by mouth once daily. 90 capsule 1    ENTRESTO  mg per tablet Take 1 tablet by mouth 2 (two) times daily.      fluticasone propionate (FLONASE) 50 mcg/actuation nasal spray 1 spray (50 mcg total) by Each Nostril route 2 (two) times daily. 18.2 mL 3    furosemide (LASIX) 40 MG tablet Take 40 mg by mouth once daily.      gabapentin (NEURONTIN) 300 MG capsule Take 4 capsules (1,200 mg total) by mouth 2 (two) times daily. (Patient taking differently: Take 900 mg by mouth 2 (two) times daily.) 540 capsule 1    glimepiride (AMARYL) 4 MG tablet Take 4 mg by mouth daily with breakfast.       HYDROcodone-acetaminophen (NORCO) 5-325 mg per tablet Take 1 tablet by mouth every 8 (eight) hours as needed for Pain. 60 tablet 0    hydrOXYzine HCL (ATARAX) 25 MG tablet Take 1 tablet (25 mg total) by mouth 3 (three) times daily as needed for Itching or Anxiety. 90 tablet 3    insulin NPH-insulin regular, 70/30, (NOVOLIN 70/30) 100 unit/mL (70-30) injection Inject into the skin. Inject 10 units at breakfast and inject 10 units at night      insulin syringe-needle U-100 0.3 mL 31 gauge x 15/64" Syrg USE TO INJECT INSULIN THREE TIMES DAILY      insulin syringe-needle U-100 1/2 mL 31 x 5/16" Syrg       ipratropium (ATROVENT) 42 mcg (0.06 %) nasal spray 2 sprays by Nasal route every 6 (six) hours as needed for Rhinitis (use as needed for runny nose and also use 15 minutes prior to meal). Clean nose with saline prior to use 15 mL 3    isosorbide mononitrate (IMDUR) 30 MG 24 hr tablet Take 30 mg by mouth once daily.      lancets Misc To check BG 3 times daily, to use with insurance preferred meter 200 each 11    LIDOcaine (LIDODERM) 5 % Place 1 patch onto the skin once daily. Remove & Discard patch within 12 hours or as directed by " MD 5 patch 1    meclizine (ANTIVERT) 12.5 mg tablet Take 1 tablet (12.5 mg total) by mouth 3 (three) times daily as needed for Dizziness. 90 tablet 0    methocarbamoL (ROBAXIN) 500 MG Tab TAKE 1 TABLET BY MOUTH 4 TIMES DAILY FOR 10 DAYS 60 tablet 0    nitroGLYCERIN (NITROSTAT) 0.4 MG SL tablet DISSOLVE 1 TABLET UNDER THE TONGUE EVERY 5 MINUTES AS  NEEDED FOR CHEST PAIN. MAX  OF 3 TABLETS IN 15 MINUTES. CALL 911 IF PAIN PERSISTS.      pravastatin (PRAVACHOL) 20 MG tablet Take 1 tablet (20 mg total) by mouth once daily. 90 tablet 1    spironolactone (ALDACTONE) 25 MG tablet Take 25 mg by mouth.      tiotropium-olodateroL (STIOLTO RESPIMAT) 2.5-2.5 mcg/actuation Mist Inhale 1 puff into the lungs once daily. Controller 1 g 11    triamcinolone acetonide 0.1% (KENALOG) 0.1 % cream SMARTSI Application Topical 2-3 Times Daily      valsartan (DIOVAN) 80 MG tablet Take 80 mg by mouth.      VIT C/VIT E AC/LUT/COPPER/ZINC (PRESERVISION LUTEIN ORAL) Take by mouth.      warfarin (COUMADIN) 5 MG tablet Take 2 tabs by mouth on Tuesday,Thursday, Saturday and Sundays then 1.5 on all other days.      blood-glucose meter kit To check BG 3 times daily, to use with insurance preferred meter 1 each 0     No current facility-administered medications on file prior to visit.       Objective:      BP (!) 107/59 (BP Location: Left arm, Patient Position: Sitting, BP Method: Medium (Automatic))   Pulse 69   Resp 16   SpO2 (!) 92%     Exam:  GEN:  Well developed, well nourished.  No acute distress.   HEENT:  No trauma.  Mucous membranes moist.  Nares patent bilaterally.  PSYCH: Normal affect. Thought content appropriate.  CHEST:  Breathing symmetric.  No audible wheezing.  ABD: Soft, non-distended.  SKIN:  Warm, pink, dry.  No rash on exposed areas.    EXT:  No cyanosis, clubbing, or edema.  No color change or changes in nail or hair growth.  NEURO/MUSCULOSKELETAL:  Fully alert, oriented, and appropriate. Speech normal keesha. No cranial  nerve deficits.   Gait:  Antalgic.  Uses rolling walker for ambulation.  No    Imaging:  Narrative & Impression    EXAMINATION:  XR HIP WITH PELVIS WHEN PERFORMED, 2 OR 3  VIEWS RIGHT     CLINICAL HISTORY:  Unspecified fall, initial encounter     TECHNIQUE:  AP view of the pelvis and frog leg lateral view of the right hip were performed.     COMPARISON:  Non 09/21/2020 e     FINDINGS:  Mild DJD.  Left hip joint space is slightly narrowed.  No acute fracture or dislocation.  No bone destruction identified.  Vascular calcifications noted.     Impression:     See above        Electronically signed by: Trever Thompson MD  Date:                                            08/11/2023  Time:                                           12:23     Narrative & Impression    EXAMINATION:  XR LUMBAR SPINE AP AND LATERAL     CLINICAL HISTORY:  Unspecified fall, initial encounter     TECHNIQUE:  AP, lateral and spot images were performed of the lumbar spine.     COMPARISON:  10/19/2022 CT lumbar spine none     FINDINGS:  Mild DJD.  There is a grade 1 L4/L5 anterolisthesis.  The L4/L5 disc space is slightly narrowed.  No fracture or dislocation.  No bone destruction identified     Impression:     See above        Electronically signed by: Trever Thompson MD  Date:                                            08/11/2023  Time:                                           12:21     Narrative & Impression    EXAMINATION:  CT LUMBAR SPINE WITHOUT CONTRAST     CLINICAL HISTORY:  Lumbar radiculopathy, no red flags, no prior management;  Radiculopathy, lumbar region     TECHNIQUE:  Low-dose axial, sagittal and coronal reformations are obtained through the lumbar spine.  Contrast was not administered.     COMPARISON:  02/25/2015     FINDINGS:  Alignment: Grade 1 anterolisthesis at L4-L5.     Vertebrae: No fracture. No lytic or blastic lesion.     Discs: Mild disc height loss at L3-L5.     Sacroiliac joints: Mild degenerative changes.     Degenerative  findings:     T12-L1: No spinal canal stenosis or neural foraminal narrowing.     L1-L2: No spinal canal stenosis or neural foraminal narrowing.     L2-L3: Circumferential disc bulge and mild facet arthropathy resulting in mild bilateral neural foraminal narrowing.     L3-L4: Circumferential disc bulge and moderate facet arthropathy resulting in moderate to severe spinal canal stenosis and moderate bilateral neural foraminal narrowing.     L4-L5: Circumferential disc bulge and severe facet arthropathy resulting in severe spinal canal stenosis and moderate bilateral neural foraminal narrowing.     L5-S1: Circumferential disc bulge and severe facet arthropathy resulting in mild bilateral neural foraminal narrowing.     Paraspinal muscles & soft tissues: Mild paraspinal muscle atrophy.  Vascular calcifications with partially visualized ectasia of the abdominal aorta and iliac arteries.  Fibrotic changes at the lung bases.     Impression:     1. Multilevel degenerative changes of the lumbar spine detailed above.  Moderate to severe spinal canal stenosis at L4-S1.  Moderate neural foraminal narrowing at L3-L5.  2. 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        Electronically signed by: Yogesh Gamez MD  Date:                                            10/19/2022  Time:                                           09:56       Assessment:       1. DDD (degenerative disc disease), lumbar        2. Spinal stenosis of lumbar region with neurogenic claudication        3. Lumbar spondylosis        4. Lumbar radiculopathy            Plan:       Percy was seen today for follow-up.    Diagnoses and all orders for this visit:    DDD (degenerative disc disease), lumbar    Spinal stenosis of lumbar region with neurogenic claudication    Lumbar spondylosis    Lumbar radiculopathy        Percy Ramirez is a 83 y.o. male with chronic low back and lower extremity pain.  Subjectively symptoms most  consistent with neurogenic claudication related to spinal stenosis.  Patient is noted to have multilevel moderate to severe spinal stenosis on lumbar CT scan.    Prior records reviewed.   Continue with scheduled physical therapy.  Continue Norco 5-325 mg q.8 hours p.r.n..  Three, one month supplies of 60 pills per month provided today.  Patient reportedly taking half a pill 3 times a day, occasionally will take a full pill with episodes of increased pain.  Reports typically taking approximately 2 total pills per day.  Prescription monitoring program reviewed today.  No inconsistencies noted.   Drugs of abuse blood screen completed on 06/15/2023.  Consistent with prescribed medications.  Opioid risk tool completed.  Low risk.  Pain contract signed on 03/24/2023  Dr. Santos is the provider of medication management and agrees with the plan of care.   Return to clinic in 3 months or sooner if needed.  At that time we will discuss efficacy of medications and make and necessary adjustments.       Fernando Downs PA-C  [facility]  Interventional Pain Management   11/03/2023    This note was created by combination of typed  and M-Modal dictation.  Transcription and phonetic errors may be present.  If there are any questions, please contact me.

## 2023-11-13 ENCOUNTER — OFFICE VISIT (OUTPATIENT)
Dept: NEUROLOGY | Facility: CLINIC | Age: 83
End: 2023-11-13
Payer: MEDICARE

## 2023-11-13 VITALS
DIASTOLIC BLOOD PRESSURE: 70 MMHG | HEIGHT: 68 IN | BODY MASS INDEX: 29.9 KG/M2 | WEIGHT: 197.31 LBS | SYSTOLIC BLOOD PRESSURE: 132 MMHG | HEART RATE: 86 BPM

## 2023-11-13 DIAGNOSIS — R42 CHRONIC VERTIGO: ICD-10-CM

## 2023-11-13 DIAGNOSIS — I67.2 CEREBRAL ATHEROSCLEROSIS: ICD-10-CM

## 2023-11-13 DIAGNOSIS — R42 DIZZINESS AND GIDDINESS: Primary | ICD-10-CM

## 2023-11-13 PROCEDURE — 3288F FALL RISK ASSESSMENT DOCD: CPT | Mod: CPTII,S$GLB,, | Performed by: STUDENT IN AN ORGANIZED HEALTH CARE EDUCATION/TRAINING PROGRAM

## 2023-11-13 PROCEDURE — 3075F SYST BP GE 130 - 139MM HG: CPT | Mod: CPTII,S$GLB,, | Performed by: STUDENT IN AN ORGANIZED HEALTH CARE EDUCATION/TRAINING PROGRAM

## 2023-11-13 PROCEDURE — 1157F ADVNC CARE PLAN IN RCRD: CPT | Mod: CPTII,S$GLB,, | Performed by: STUDENT IN AN ORGANIZED HEALTH CARE EDUCATION/TRAINING PROGRAM

## 2023-11-13 PROCEDURE — 99999 PR PBB SHADOW E&M-EST. PATIENT-LVL IV: CPT | Mod: PBBFAC,,, | Performed by: STUDENT IN AN ORGANIZED HEALTH CARE EDUCATION/TRAINING PROGRAM

## 2023-11-13 PROCEDURE — 99214 OFFICE O/P EST MOD 30 MIN: CPT | Mod: S$GLB,,, | Performed by: STUDENT IN AN ORGANIZED HEALTH CARE EDUCATION/TRAINING PROGRAM

## 2023-11-13 PROCEDURE — 1100F PR PT FALLS ASSESS DOC 2+ FALLS/FALL W/INJURY/YR: ICD-10-PCS | Mod: CPTII,S$GLB,, | Performed by: STUDENT IN AN ORGANIZED HEALTH CARE EDUCATION/TRAINING PROGRAM

## 2023-11-13 PROCEDURE — 1125F AMNT PAIN NOTED PAIN PRSNT: CPT | Mod: CPTII,S$GLB,, | Performed by: STUDENT IN AN ORGANIZED HEALTH CARE EDUCATION/TRAINING PROGRAM

## 2023-11-13 PROCEDURE — 99999 PR PBB SHADOW E&M-EST. PATIENT-LVL IV: ICD-10-PCS | Mod: PBBFAC,,, | Performed by: STUDENT IN AN ORGANIZED HEALTH CARE EDUCATION/TRAINING PROGRAM

## 2023-11-13 PROCEDURE — 1159F PR MEDICATION LIST DOCUMENTED IN MEDICAL RECORD: ICD-10-PCS | Mod: CPTII,S$GLB,, | Performed by: STUDENT IN AN ORGANIZED HEALTH CARE EDUCATION/TRAINING PROGRAM

## 2023-11-13 PROCEDURE — 3288F PR FALLS RISK ASSESSMENT DOCUMENTED: ICD-10-PCS | Mod: CPTII,S$GLB,, | Performed by: STUDENT IN AN ORGANIZED HEALTH CARE EDUCATION/TRAINING PROGRAM

## 2023-11-13 PROCEDURE — 99214 PR OFFICE/OUTPT VISIT, EST, LEVL IV, 30-39 MIN: ICD-10-PCS | Mod: S$GLB,,, | Performed by: STUDENT IN AN ORGANIZED HEALTH CARE EDUCATION/TRAINING PROGRAM

## 2023-11-13 PROCEDURE — 1100F PTFALLS ASSESS-DOCD GE2>/YR: CPT | Mod: CPTII,S$GLB,, | Performed by: STUDENT IN AN ORGANIZED HEALTH CARE EDUCATION/TRAINING PROGRAM

## 2023-11-13 PROCEDURE — 3078F PR MOST RECENT DIASTOLIC BLOOD PRESSURE < 80 MM HG: ICD-10-PCS | Mod: CPTII,S$GLB,, | Performed by: STUDENT IN AN ORGANIZED HEALTH CARE EDUCATION/TRAINING PROGRAM

## 2023-11-13 PROCEDURE — 1157F PR ADVANCE CARE PLAN OR EQUIV PRESENT IN MEDICAL RECORD: ICD-10-PCS | Mod: CPTII,S$GLB,, | Performed by: STUDENT IN AN ORGANIZED HEALTH CARE EDUCATION/TRAINING PROGRAM

## 2023-11-13 PROCEDURE — 3078F DIAST BP <80 MM HG: CPT | Mod: CPTII,S$GLB,, | Performed by: STUDENT IN AN ORGANIZED HEALTH CARE EDUCATION/TRAINING PROGRAM

## 2023-11-13 PROCEDURE — 3075F PR MOST RECENT SYSTOLIC BLOOD PRESS GE 130-139MM HG: ICD-10-PCS | Mod: CPTII,S$GLB,, | Performed by: STUDENT IN AN ORGANIZED HEALTH CARE EDUCATION/TRAINING PROGRAM

## 2023-11-13 PROCEDURE — 1125F PR PAIN SEVERITY QUANTIFIED, PAIN PRESENT: ICD-10-PCS | Mod: CPTII,S$GLB,, | Performed by: STUDENT IN AN ORGANIZED HEALTH CARE EDUCATION/TRAINING PROGRAM

## 2023-11-13 PROCEDURE — 1159F MED LIST DOCD IN RCRD: CPT | Mod: CPTII,S$GLB,, | Performed by: STUDENT IN AN ORGANIZED HEALTH CARE EDUCATION/TRAINING PROGRAM

## 2023-11-13 RX ORDER — DULOXETIN HYDROCHLORIDE 60 MG/1
60 CAPSULE, DELAYED RELEASE ORAL
Qty: 90 CAPSULE | Refills: 0 | OUTPATIENT
Start: 2023-11-13

## 2023-11-13 NOTE — PROGRESS NOTES
"    Chief Complaint      Checkup visit    History of Present Illness     Percy Ramirez is a 83 y.o. male with a history of multiple medical diagnoses as listed below that presents for vertigo/dizziness, states has seen multiple specialists including his ophthalmologist and associated with macular degeneration. Describes it as when he looks at objects, he feels like a boat is rocking. Denies ear ringing, has been on meclizine to see if any relief.    Patient is on warfarin and plavix. Has CAD, s/p CABG. With bilateral caronary artery disease among his other medical problems.    Interim:  Patient checking in, continues on warfarin and plavix. Did not get carotid ultrasound. Doing well.     Review of patient's allergies indicates:   Allergen Reactions    Aricept odt [donepezil] Diarrhea and Nausea And Vomiting    Codeine Nausea Only     weak    Ranexa [ranolazine]      Current Outpatient Medications   Medication Sig Dispense Refill    acetaminophen (TYLENOL) 325 MG tablet Take 2 tablets (650 mg total) by mouth every 6 (six) hours as needed. 13 tablet 0    albuterol (PROVENTIL/VENTOLIN HFA) 90 mcg/actuation inhaler INHALE 2 PUFFS BY MOUTH EVERY 6 HOURS AS NEEDED FOR WHEEZING OR  SHORTNESS  OF  BREATH 54 g 0    atorvastatin (LIPITOR) 40 MG tablet Take 40 mg by mouth once daily.      BD INSULIN PEN NEEDLE UF SHORT 31 gauge x 5/16" Ndle       BD INSULIN SYRINGE ULTRA-FINE 1/2 mL 31 gauge x 15/64" Syrg       blood sugar diagnostic Strp To check BG 3 times daily, to use with insurance preferred meter 200 strip 11    carvedilol (COREG) 25 MG tablet Take 1 tablet (25 mg total) by mouth 2 (two) times daily. 180 tablet 0    ceramides 1,3,6-II (CERAVE DAILY MOISTURIZING) Lotn Apply twice daily to skin 355 mL 1    cinnamon bark 500 mg capsule Take 1,000 mg by mouth once daily.        clopidogreL (PLAVIX) 75 mg tablet Take 75 mg by mouth.      diclofenac sodium (VOLTAREN) 1 % Gel Apply 2 g topically 2 (two) times daily. 100 g 0 " "   diclofenac sodium (VOLTAREN) 1 % Gel Apply 2 g topically 3 (three) times daily. 50 g 0    DULoxetine (CYMBALTA) 60 MG capsule Take 1 capsule (60 mg total) by mouth once daily. 90 capsule 1    ENTRESTO  mg per tablet Take 1 tablet by mouth 2 (two) times daily.      fluticasone propionate (FLONASE) 50 mcg/actuation nasal spray 1 spray (50 mcg total) by Each Nostril route 2 (two) times daily. 18.2 mL 3    furosemide (LASIX) 40 MG tablet Take 40 mg by mouth once daily.      gabapentin (NEURONTIN) 300 MG capsule Take 4 capsules (1,200 mg total) by mouth 2 (two) times daily. (Patient taking differently: Take 900 mg by mouth 2 (two) times daily.) 540 capsule 1    glimepiride (AMARYL) 4 MG tablet Take 4 mg by mouth daily with breakfast.       [START ON 11/25/2023] HYDROcodone-acetaminophen (NORCO) 5-325 mg per tablet Take 1 tablet by mouth every 8 (eight) hours as needed for Pain. 60 tablet 0    [START ON 12/25/2023] HYDROcodone-acetaminophen (NORCO) 5-325 mg per tablet Take 1 tablet by mouth every 8 (eight) hours as needed for Pain. 60 tablet 0    HYDROcodone-acetaminophen (NORCO) 5-325 mg per tablet Take 1 tablet by mouth every 8 (eight) hours as needed for Pain. 60 tablet 0    hydrOXYzine HCL (ATARAX) 25 MG tablet Take 1 tablet (25 mg total) by mouth 3 (three) times daily as needed for Itching or Anxiety. 90 tablet 3    insulin NPH-insulin regular, 70/30, (NOVOLIN 70/30) 100 unit/mL (70-30) injection Inject into the skin. Inject 10 units at breakfast and inject 10 units at night      insulin syringe-needle U-100 0.3 mL 31 gauge x 15/64" Syrg USE TO INJECT INSULIN THREE TIMES DAILY      insulin syringe-needle U-100 1/2 mL 31 x 5/16" Syrg       ipratropium (ATROVENT) 42 mcg (0.06 %) nasal spray 2 sprays by Nasal route every 6 (six) hours as needed for Rhinitis (use as needed for runny nose and also use 15 minutes prior to meal). Clean nose with saline prior to use 15 mL 3    isosorbide mononitrate (IMDUR) 30 MG 24 " hr tablet Take 30 mg by mouth once daily.      lancets Misc To check BG 3 times daily, to use with insurance preferred meter 200 each 11    LIDOcaine (LIDODERM) 5 % Place 1 patch onto the skin once daily. Remove & Discard patch within 12 hours or as directed by MD 5 patch 1    meclizine (ANTIVERT) 12.5 mg tablet Take 1 tablet (12.5 mg total) by mouth 3 (three) times daily as needed for Dizziness. 90 tablet 0    methocarbamoL (ROBAXIN) 500 MG Tab TAKE 1 TABLET BY MOUTH 4 TIMES DAILY FOR 10 DAYS 60 tablet 0    nitroGLYCERIN (NITROSTAT) 0.4 MG SL tablet DISSOLVE 1 TABLET UNDER THE TONGUE EVERY 5 MINUTES AS  NEEDED FOR CHEST PAIN. MAX  OF 3 TABLETS IN 15 MINUTES. CALL 911 IF PAIN PERSISTS.      pravastatin (PRAVACHOL) 20 MG tablet Take 1 tablet (20 mg total) by mouth once daily. 90 tablet 1    spironolactone (ALDACTONE) 25 MG tablet Take 25 mg by mouth.      tiotropium-olodateroL (STIOLTO RESPIMAT) 2.5-2.5 mcg/actuation Mist Inhale 1 puff into the lungs once daily. Controller 1 g 11    triamcinolone acetonide 0.1% (KENALOG) 0.1 % cream SMARTSI Application Topical 2-3 Times Daily      valsartan (DIOVAN) 80 MG tablet Take 80 mg by mouth.      VIT C/VIT E AC/LUT/COPPER/ZINC (PRESERVISION LUTEIN ORAL) Take by mouth.      warfarin (COUMADIN) 5 MG tablet Take 2 tabs by mouth on Tuesday,Thursday, Saturday and Sundays then 1.5 on all other days.      blood-glucose meter kit To check BG 3 times daily, to use with insurance preferred meter 1 each 0     No current facility-administered medications for this visit.       Medical History     Past Medical History:   Diagnosis Date    Allergy     Arthritis     CAD, multiple vessel     DR Melissa    Cataract     Depression     History of colon polyps     Hyperlipidemia     Hypertension     Macular degeneration     Dr Razo for injection, Jennifer for eye    S/P CABG x 2     Type 2 diabetes mellitus with circulatory disorder     Dr. Aquino     Past Surgical History:   Procedure  Laterality Date    broken elbow      left    COLONOSCOPY N/A 10/4/2017    Procedure: COLONOSCOPY;  Surgeon: Gabriel Leung MD;  Location: [facility] ENDO;  Service: Endoscopy;  Laterality: N/A;    EYE SURGERY      cataract    heart bypass      2004    HERNIA REPAIR      right    ROTATOR CUFF REPAIR      right  2004     Family History   Problem Relation Age of Onset    Arthritis Mother     Diabetes Mother     Stroke Mother     Heart attack Father     Cancer Brother     Throat cancer Brother     Diabetes Maternal Aunt     Diabetes Maternal Uncle     Heart disease Maternal Uncle     Diabetes Paternal Aunt     Heart disease Paternal Aunt     Heart disease Paternal Uncle     Heart disease Maternal Grandmother     Cancer Maternal Grandfather      Social History     Socioeconomic History    Marital status:     Number of children: 4    Years of education: GED   Occupational History    Occupation: retired tug    Tobacco Use    Smoking status: Former     Current packs/day: 0.00     Average packs/day: 3.0 packs/day for 45.0 years (135.0 ttl pk-yrs)     Types: Cigarettes     Start date: 1959     Quit date: 2004     Years since quittin.5    Smokeless tobacco: Former   Substance and Sexual Activity    Alcohol use: Yes     Comment: was heavy drinker 35 years ago, rare use now    Drug use: No    Sexual activity: Yes     Partners: Female       Exam     Vitals:    23 1059   BP: 132/70   Pulse: 86      Physical Exam:  General: Not in acute distress. Not ill-appearing.   HENT: Normocephalic and atraumatic. Moist mucous membranes.  Eyes: Conjunctivae normal.   Pulmonary: Pulmonary effort is normal.   Abdominal: Abdomen is soft and flat.   Skin: Skin is warm and dry. No rashes.   Psychiatric: Mood normal.        Neurologic Exam   Mental status: oriented to person, place, and time  Attention: Normal. Concentration: normal.  Speech: speech is normal.  Cranial Nerves: PERRL, EOMI intact, V1-V3 Facial  "sensation intact. Symmetric facies. Hearing grossly intact. Palate and uvula midline, symmetric. No tongue deviation. Trapezius strength intact.     Motor exam: bulk and tone normal. Strength 5/5 grossly of upper and lower extremities    Reflexes: 2+ in bilateral upper extremities: biceps and brachiaradialis, 2+ in bilateral lower extremities: patellar     Sensory exam: light touch intact    Gait exam: uses a rolling walker    Labs and Imaging     Labs: reviewed  A1C 8, THS wnl, LDL 81    Imaging: personally reviewed  10/2022 CT lumbar spine - multilevel disc degenerative changes, moderate to severe canal stenosis L4-S1, moderate neural foraminal narrowing L3-L5    7/2022- CTH with chronic microvascular changes    Other procedures: reviewed  7/2022 - US carotid bilateral - 50-69% stenosis on L    Assessment and Plan     Problem List Items Addressed This Visit          Neuro    Cerebral atherosclerosis    Relevant Orders    CV Ultrasound Bilateral Doppler Carotid       Other    Dizziness and giddiness - Primary    Overview     Discussed use of meclizine is his primary medication to address vertigo.  Valium was prescribed for about start are under control with this medication along.  Discussed that the medication can and will cause increased balance dysfunction and possible weakness given is nature.  Patient advised that if he uses the medication he should not he is about more than necessary in order to prevent or reduce his risk of falls.  Patient voiced understanding         Relevant Orders    CV Ultrasound Bilateral Doppler Carotid     Patient doing well. Here to check in. Was on meclizine prior for dizziness - pcp refilled.    Patient w hx significant for CAD and CABG x 2, carotid artery stenosis. Has macular degeneration in which he feels like "boat is rocking." Meclizine w mild improvement. Sees optho.     Will get US carotid given one in 7/2022 with L 50-69% stenosis - if worsens could potentially warrant " intervention, patient on warfarin and plavix and follows w cardiology.     Questions answered, plans discussed    Fall risk precautions.     Follow-up: 3-4 months

## 2023-11-14 RX ORDER — DULOXETIN HYDROCHLORIDE 60 MG/1
60 CAPSULE, DELAYED RELEASE ORAL DAILY
Qty: 90 CAPSULE | Refills: 0 | Status: SHIPPED | OUTPATIENT
Start: 2023-11-14 | End: 2024-01-05 | Stop reason: SDUPTHER

## 2023-11-14 NOTE — TELEPHONE ENCOUNTER
Called and spoke to pt. Appt made w/ pt for Feb 2024. Mailed appt reminder. Advised that rx supply of Cymbalta has been sent to pharmacy until appt date. Advised to call office in case any questions. No concerns voiced.

## 2023-11-14 NOTE — TELEPHONE ENCOUNTER
----- Message from Lauro Parada PA-C sent at 11/14/2023 12:33 PM CST -----  Regarding: FW: patient call back  I filled his Cymbalta.  Can you make him a 3 month appt.  60 min pls    thanks  ----- Message -----  From: Donna Gillis  Sent: 11/14/2023  11:54 AM CST  To: Tal Restrepo Staff  Subject: patient call back                                Type: Patient Call Back    Who called: Self     What is the request in detail: Asked for a call back to get scheduled     [facility]     Would the patient rather a call back or a response via [facility]? Call     Best call back number: .[phone]      Additional Information: Also needs a refill for DULoxetine (CYMBALTA) 60 MG capsule. Rene's said that he does not have anymore refills.

## 2023-11-15 ENCOUNTER — TELEPHONE (OUTPATIENT)
Dept: NEUROLOGY | Facility: CLINIC | Age: 83
End: 2023-11-15
Payer: MEDICARE

## 2023-11-15 NOTE — TELEPHONE ENCOUNTER
Called pt to let him know that Dr. Saldivar feels that pt should take rivastigmine and she does not prescribe that medicine, pt verbalized understanding.

## 2023-11-17 ENCOUNTER — TELEPHONE (OUTPATIENT)
Dept: NEUROLOGY | Facility: CLINIC | Age: 83
End: 2023-11-17
Payer: MEDICARE

## 2023-11-17 NOTE — TELEPHONE ENCOUNTER
----- Message from Junie Barriga sent at 11/17/2023  7:14 AM CST -----  Regarding: Patient Advice                  Name of Who is Calling:  Percy Ramirez    Who Left The Message:  Precy Ramirez      What is the request in detail:        Patient called requesting a call regarding when was his last Carotid Artery test and when will the next test be scheduled.  Please give the patient a call back at your earliest convenience and further advise.    Thank you      Reply by [facility]: NO       Preferred Call Back: [phone] (M)      Returned pt's call, he is scheduled for his ultrasound on 12/14.

## 2023-11-20 ENCOUNTER — NURSE TRIAGE (OUTPATIENT)
Dept: ADMINISTRATIVE | Facility: CLINIC | Age: 83
End: 2023-11-20
Payer: MEDICARE

## 2023-11-20 NOTE — TELEPHONE ENCOUNTER
"Spoke with pt who reports that he is wheezing. Reports Hx of COPD. States o2 sat is 95%. Denies worsening SOB. Pt advised to be seen in office within 3 days. Pt declined appointment. States that he will use his inhaler. Pt also advised he can be seen in ED/UC. Verbalized understanding.        Reason for Disposition   MODERATE longstanding difficulty breathing (e.g., speaks in phrases, SOB even at rest, pulse 100-120) and SAME as normal    Additional Information   Negative: SEVERE difficulty breathing (e.g., struggling for each breath, speaks in single words, pulse > 120)   Negative: Breathing stopped and hasn't returned   Negative: Choking on something   Negative: Bluish (or gray) lips or face   Negative: Difficult to awaken or acting confused (e.g., disoriented, slurred speech)   Negative: Passed out (i.e., fainted, collapsed and was not responding)   Negative: Wheezing started suddenly after medicine, an allergic food, or bee sting   Negative: Stridor (harsh sound while breathing in)   Negative: Slow, shallow and weak breathing   Negative: Sounds like a life-threatening emergency to the triager   Negative: MODERATE difficulty breathing (e.g., speaks in phrases, SOB even at rest, pulse 100-120) of new-onset or worse than normal   Negative: Oxygen level (e.g., pulse oximetry) 90% or lower   Negative: Wheezing can be heard across the room   Negative: Drooling or spitting out saliva (because can't swallow)   Negative: Any history of prior "blood clot" in leg or lungs   Negative: Illness requiring prolonged bedrest in past month (e.g., immobilization, long hospital stay)   Negative: Hip or leg fracture (broken bone) in past month (or had cast on leg or ankle in past month)   Negative: Major surgery in the past month   Negative: Long-distance travel in past month (e.g., car, bus, train, plane; with trip lasting 6 or more hours)   Negative: Cancer treatment in past six months (or has cancer now)   Negative: Extra " heartbeats, irregular heart beating, or heart is beating very fast (i.e., 'palpitations')   Negative: Fever > 103 F (39.4 C)   Negative: Fever > 101 F (38.3 C) and over 60 years of age   Negative: Fever > 100.0 F (37.8 C) and bedridden (e.g., nursing home patient, stroke, chronic illness, recovering from surgery)   Negative: Fever > 100.0 F (37.8 C) and diabetes mellitus or weak immune system (e.g., HIV positive, cancer chemo, splenectomy, organ transplant, chronic steroids)   Negative: Periods where breathing stops and then resumes normally and bedridden (e.g., nursing home patient, CVA)   Negative: Pregnant or postpartum (from 0 to 6 weeks after delivery)   Negative: Patient sounds very sick or weak to the triager   Negative: MILD difficulty breathing (e.g., minimal/no SOB at rest, SOB with walking, pulse < 100) of new-onset or worse than normal   Negative: Longstanding difficulty breathing (e.g., CHF, COPD, emphysema) and worse than normal   Negative: Longstanding difficulty breathing and not responding to usual therapy   Negative: Continuous (nonstop) coughing   Negative: Patient wants to be seen   Negative: Oxygen level (e.g., pulse oximetry) 91 to 94%    Protocols used: Breathing Difficulty-A-[place]

## 2023-11-21 NOTE — TELEPHONE ENCOUNTER
Spoke with patient. Patient states he always have shortness of breath and is doing ok today. Patient states he will go to the ed if it gets worst.

## 2023-11-28 ENCOUNTER — TELEPHONE (OUTPATIENT)
Dept: FAMILY MEDICINE | Facility: CLINIC | Age: 83
End: 2023-11-28
Payer: MEDICARE

## 2023-11-28 NOTE — TELEPHONE ENCOUNTER
----- Message from Graham Alba sent at 11/28/2023  4:21 PM CST -----  Regarding: Self  [phone]  Type: Patient Call Back    Who called: Self     What is the request in detail: called to talk to the office to find out if the doctor has the information on what kind of flu shot he got. Would like a call back.     Can the clinic reply by [facility]? No     Would the patient rather a call back or a response via [facility]? Call back     Best call back number: [phone]     Additional Information:    Thank you.

## 2023-12-04 ENCOUNTER — TELEPHONE (OUTPATIENT)
Dept: FAMILY MEDICINE | Facility: CLINIC | Age: 83
End: 2023-12-04
Payer: MEDICARE

## 2023-12-04 NOTE — TELEPHONE ENCOUNTER
----- Message from Rosmery Sorensen MA sent at 12/4/2023 11:18 AM CST -----  Name of Who is Calling:JAILENE JIMENES [[id number]]                What is the request in detail: Pt states he had on shorts last night and his belly started itching and burning and wants to know if it may be shingles. Please assist.                Can the clinic reply by [facility]: No                What Number to Call Back if not in [facility]: [phone]

## 2023-12-04 NOTE — TELEPHONE ENCOUNTER
"Spoke to patient in reference to him having possibly the shingles. Patient states he had huis shingles vaccine already. Patient questioned whether or not his waist area had blisters and does it hurt? Patient stated "No', I had my vaccines already. Patient instructed to put some lotion around his waist and avoid tight fitting clothes. Patient instructed to call back if his skin is still irritated.   "

## 2023-12-14 ENCOUNTER — HOSPITAL ENCOUNTER (OUTPATIENT)
Dept: CARDIOLOGY | Facility: HOSPITAL | Age: 83
Discharge: HOME | End: 2023-12-14
Attending: STUDENT IN AN ORGANIZED HEALTH CARE EDUCATION/TRAINING PROGRAM
Payer: MEDICARE

## 2023-12-14 DIAGNOSIS — R42 DIZZINESS AND GIDDINESS: ICD-10-CM

## 2023-12-14 DIAGNOSIS — I67.2 CEREBRAL ATHEROSCLEROSIS: ICD-10-CM

## 2023-12-14 LAB
LEFT CBA DIAS: 23 CM/S
LEFT CBA SYS: 108 CM/S
LEFT CCA DIST DIAS: 11 CM/S
LEFT CCA DIST SYS: 65 CM/S
LEFT CCA MID DIAS: 14 CM/S
LEFT CCA MID SYS: 61 CM/S
LEFT CCA PROX DIAS: 12 CM/S
LEFT CCA PROX SYS: 90 CM/S
LEFT ECA DIAS: 0 CM/S
LEFT ECA SYS: 429 CM/S
LEFT ICA DIST DIAS: 14 CM/S
LEFT ICA DIST SYS: 54 CM/S
LEFT ICA MID DIAS: 7 CM/S
LEFT ICA MID SYS: 139 CM/S
LEFT ICA PROX DIAS: 36 CM/S
LEFT ICA PROX SYS: 200 CM/S
LEFT VERTEBRAL DIAS: 8 CM/S
LEFT VERTEBRAL SYS: 73 CM/S
OHS CV CAROTID RIGHT ICA EDV HIGHEST: 15
OHS CV CAROTID ULTRASOUND LEFT ICA/CCA RATIO: 3.08
OHS CV CAROTID ULTRASOUND RIGHT ICA/CCA RATIO: 1.5
OHS CV PV CAROTID LEFT HIGHEST CCA: 90
OHS CV PV CAROTID LEFT HIGHEST ICA: 200
OHS CV PV CAROTID RIGHT HIGHEST CCA: 92
OHS CV PV CAROTID RIGHT HIGHEST ICA: 81
OHS CV US CAROTID LEFT HIGHEST EDV: 36
RIGHT CBA DIAS: 10 CM/S
RIGHT CBA SYS: 75 CM/S
RIGHT CCA DIST DIAS: 8 CM/S
RIGHT CCA DIST SYS: 54 CM/S
RIGHT CCA MID DIAS: 6 CM/S
RIGHT CCA MID SYS: 62 CM/S
RIGHT CCA PROX DIAS: 2 CM/S
RIGHT CCA PROX SYS: 92 CM/S
RIGHT ECA DIAS: 0 CM/S
RIGHT ECA SYS: 144 CM/S
RIGHT ICA DIST DIAS: 9 CM/S
RIGHT ICA DIST SYS: 44 CM/S
RIGHT ICA MID DIAS: 15 CM/S
RIGHT ICA MID SYS: 81 CM/S
RIGHT ICA PROX DIAS: 13 CM/S
RIGHT ICA PROX SYS: 63 CM/S
RIGHT VERTEBRAL DIAS: 10 CM/S
RIGHT VERTEBRAL SYS: 40 CM/S

## 2023-12-14 PROCEDURE — 93880 EXTRACRANIAL BILAT STUDY: CPT | Mod: 26,,, | Performed by: INTERNAL MEDICINE

## 2023-12-14 PROCEDURE — 93880 CV US DOPPLER CAROTID (CUPID ONLY): ICD-10-PCS | Mod: 26,,, | Performed by: INTERNAL MEDICINE

## 2023-12-14 PROCEDURE — 93880 EXTRACRANIAL BILAT STUDY: CPT

## 2023-12-19 ENCOUNTER — OFFICE VISIT (OUTPATIENT)
Dept: PULMONOLOGY | Facility: CLINIC | Age: 83
End: 2023-12-19
Payer: MEDICARE

## 2023-12-19 VITALS
HEART RATE: 66 BPM | WEIGHT: 196 LBS | BODY MASS INDEX: 29.7 KG/M2 | SYSTOLIC BLOOD PRESSURE: 93 MMHG | DIASTOLIC BLOOD PRESSURE: 59 MMHG | HEIGHT: 68 IN | OXYGEN SATURATION: 92 %

## 2023-12-19 DIAGNOSIS — G47.33 OBSTRUCTIVE SLEEP APNEA TREATED WITH BIPAP: Primary | ICD-10-CM

## 2023-12-19 DIAGNOSIS — I50.42 CHRONIC COMBINED SYSTOLIC AND DIASTOLIC HEART FAILURE: ICD-10-CM

## 2023-12-19 DIAGNOSIS — J44.1 COPD EXACERBATION: ICD-10-CM

## 2023-12-19 DIAGNOSIS — Z71.89 GOALS OF CARE, COUNSELING/DISCUSSION: ICD-10-CM

## 2023-12-19 DIAGNOSIS — J43.9 COPD WITH CHRONIC BRONCHITIS AND EMPHYSEMA: ICD-10-CM

## 2023-12-19 DIAGNOSIS — J44.89 COPD WITH CHRONIC BRONCHITIS AND EMPHYSEMA: ICD-10-CM

## 2023-12-19 DIAGNOSIS — J84.9 ILD (INTERSTITIAL LUNG DISEASE): ICD-10-CM

## 2023-12-19 PROCEDURE — 99999 PR PBB SHADOW E&M-EST. PATIENT-LVL III: CPT | Mod: PBBFAC,,, | Performed by: INTERNAL MEDICINE

## 2023-12-19 PROCEDURE — 1157F PR ADVANCE CARE PLAN OR EQUIV PRESENT IN MEDICAL RECORD: ICD-10-PCS | Mod: CPTII,S$GLB,, | Performed by: INTERNAL MEDICINE

## 2023-12-19 PROCEDURE — 99214 PR OFFICE/OUTPT VISIT, EST, LEVL IV, 30-39 MIN: ICD-10-PCS | Mod: S$GLB,,, | Performed by: INTERNAL MEDICINE

## 2023-12-19 PROCEDURE — 99999 PR PBB SHADOW E&M-EST. PATIENT-LVL III: ICD-10-PCS | Mod: PBBFAC,,, | Performed by: INTERNAL MEDICINE

## 2023-12-19 PROCEDURE — 3074F SYST BP LT 130 MM HG: CPT | Mod: CPTII,S$GLB,, | Performed by: INTERNAL MEDICINE

## 2023-12-19 PROCEDURE — 3078F DIAST BP <80 MM HG: CPT | Mod: CPTII,S$GLB,, | Performed by: INTERNAL MEDICINE

## 2023-12-19 PROCEDURE — 1157F ADVNC CARE PLAN IN RCRD: CPT | Mod: CPTII,S$GLB,, | Performed by: INTERNAL MEDICINE

## 2023-12-19 PROCEDURE — 1125F AMNT PAIN NOTED PAIN PRSNT: CPT | Mod: CPTII,S$GLB,, | Performed by: INTERNAL MEDICINE

## 2023-12-19 PROCEDURE — 99214 OFFICE O/P EST MOD 30 MIN: CPT | Mod: S$GLB,,, | Performed by: INTERNAL MEDICINE

## 2023-12-19 PROCEDURE — 3078F PR MOST RECENT DIASTOLIC BLOOD PRESSURE < 80 MM HG: ICD-10-PCS | Mod: CPTII,S$GLB,, | Performed by: INTERNAL MEDICINE

## 2023-12-19 PROCEDURE — 1125F PR PAIN SEVERITY QUANTIFIED, PAIN PRESENT: ICD-10-PCS | Mod: CPTII,S$GLB,, | Performed by: INTERNAL MEDICINE

## 2023-12-19 PROCEDURE — 3074F PR MOST RECENT SYSTOLIC BLOOD PRESSURE < 130 MM HG: ICD-10-PCS | Mod: CPTII,S$GLB,, | Performed by: INTERNAL MEDICINE

## 2023-12-19 RX ORDER — ALBUTEROL SULFATE 90 UG/1
AEROSOL, METERED RESPIRATORY (INHALATION)
Qty: 54 G | Refills: 3 | Status: SHIPPED | OUTPATIENT
Start: 2023-12-19

## 2023-12-19 NOTE — PROGRESS NOTES
Percy Ramirez  was seen as a follow up.        HISTORY OF PRESENT ILLNESS: Percy Ramirez is a 83 y.o. male with pmh of tobacco abuse, cad, tyler, htn, dyslipidemia, dm2 is here for pulmonary evaluation.  Our first encounter was 1/24/13.  At that time, patient with chronic dyspnea.  However, patient stated symptoms have gradually worsen since 2012.  +pelletier x 1/2.  No fever/chills.  No wheezing.  No coughing.  No orthopnea.  No pnd.  Patient with h/o 3 ppd x 40 years.  Quit smoking 2004.  PFT with moderate-severe obstruction with FEV1 55% (6/14/19)    In the past, patient was prescribed spiriva.  However, patient did not filled due to high copay cost.  Patient underwent LDCT screening 2/11/19 by Dr. Edmondson demonstrated several ggo and diffuse emphysematous changes.  Repeat CT 6/28/22 without acute changes.        Patient was provided prescription for trelegy.  copay is $45.  Patient was getting patient assistant from [employer].  Patient was switched to Stiol[employer].  Per patient, patient assistant program for Trelegy was too complicated.  Doing well Stiolto, albuterol and oxygen.  Have been using albuterol 3 times daily.  No fever/chil.  No chest .  Still with significant dyspnea.  Breathing is better with Stiolto.  No coughing or wheezing.   Patient is living his son.      S/p split study at [facility] 2017 with ahi of 32.  S/p retitration 2019 with effective control of bipap 16/10.  Using bipap nightly without issue.        PAST MEDICAL HISTORY:    Active Ambulatory Problems     Diagnosis Date Noted    Major depressive disorder, recurrent episode, mild     Benign hypertension with chronic kidney disease, stage III     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     Coronary artery disease     S/P CABG x 2     Macular degeneration     Obstructive sleep apnea treated with BiPAP 07/27/2012    Anxiety state, unspecified 10/24/2013    Insulin long-term use 02/16/2015    Primary osteoarthritis of both knees 02/25/2015     Chronic low back pain 02/25/2015    DJD (degenerative joint disease), lumbar 04/30/2015    Poorly controlled type 2 diabetes mellitus with neuropathy 07/29/2016    Bilateral carotid artery disease 07/29/2016    Hyperlipidemia LDL goal <100 07/29/2016    Type 2 diabetes, with peripheral circulatory disorder not at goal 07/29/2016    COPD with chronic bronchitis and emphysema 07/29/2016    Atherosclerosis of abdominal aorta 07/29/2016    Overweight (BMI 25.0-29.9) 07/29/2016    Persistent atrial fibrillation 03/06/2017    Chronic stable angina 03/06/2017    Senile purpura 03/06/2017    Goals of care, counseling/discussion 10/04/2017    Osteoarthritis of carpometacarpal (CMC) joint of left thumb 11/22/2017    MCI (mild cognitive impairment) 06/18/2018    Dizziness and giddiness 02/11/2019    Pulmonary nodule 03/20/2019    Dyspnea on exertion 03/20/2019    Chronic combined systolic and diastolic heart failure 11/30/2018    Walker as ambulation aid 11/06/2019    ILD (interstitial lung disease) 05/12/2020    History of cardioversion 09/23/2020    Erectile dysfunction 01/24/2020    Secondary hyperparathyroidism of renal origin 11/06/2020    Noncompliance with medication regimen 11/06/2020    Exudative age-related macular degeneration, right eye, with active choroidal neovascularization 03/29/2022    History of stroke 04/04/2022    Chronic tension-type headache, intractable 04/08/2022    Spinal stenosis of lumbar region with neurogenic claudication 02/24/2023    Lumbar radiculopathy 02/24/2023    Lumbar spondylosis 02/24/2023    DDD (degenerative disc disease), lumbar 02/24/2023    Cerebral atherosclerosis 11/13/2023     Resolved Ambulatory Problems     Diagnosis Date Noted    Combined hyperlipidemia associated with type 2 diabetes mellitus     Fatigue 07/27/2012    Depressive disorder, not elsewhere classified 10/24/2013    Arrhythmia 12/30/2013    Dizziness of unknown cause 12/30/2013    Hypotension 06/13/2014     Hyponatremia 2014    Diabetic retinopathy of both eyes 10/23/2014    Diabetic neuropathy 2015    Poor motor control of trunk 2015    IT band syndrome 2015    Impairment of balance 2015    Uncontrolled type 2 diabetes mellitus with hyperglycemia 2016    Medication intolerance 2019    Drug-induced immunodeficiency 08/10/2022     Past Medical History:   Diagnosis Date    Allergy     Arthritis     CAD, multiple vessel     Cataract     Depression     History of colon polyps     Hyperlipidemia     Hypertension     Type 2 diabetes mellitus with circulatory disorder                 PAST SURGICAL HISTORY:    Past Surgical History:   Procedure Laterality Date    broken elbow      left    COLONOSCOPY N/A 10/4/2017    Procedure: COLONOSCOPY;  Surgeon: Gabriel Leung MD;  Location: [facility];  Service: Endoscopy;  Laterality: N/A;    EYE SURGERY      cataract    heart bypass      2004    HERNIA REPAIR      right    ROTATOR CUFF REPAIR      right  2004         FAMILY HISTORY:                Family History   Problem Relation Age of Onset    Arthritis Mother     Diabetes Mother     Stroke Mother     Heart attack Father     Cancer Brother     Throat cancer Brother     Diabetes Maternal Aunt     Diabetes Maternal Uncle     Heart disease Maternal Uncle     Diabetes Paternal Aunt     Heart disease Paternal Aunt     Heart disease Paternal Uncle     Heart disease Maternal Grandmother     Cancer Maternal Grandfather        SOCIAL HISTORY:          Tobacco:   Social History     Tobacco Use   Smoking Status Former    Current packs/day: 0.00    Average packs/day: 3.0 packs/day for 45.0 years (135.0 ttl pk-yrs)    Types: Cigarettes    Start date: 1959    Quit date: 2004    Years since quittin.6   Smokeless Tobacco Former       alcohol use:    Social History     Substance and Sexual Activity   Alcohol Use Yes    Comment: was heavy drinker 35 years ago, rare use now              "    Occupation:  Retired     ALLERGIES:    Review of patient's allergies indicates:   Allergen Reactions    Aricept odt [donepezil] Diarrhea and Nausea And Vomiting    Codeine Nausea Only     weak    Ranexa [ranolazine]        CURRENT MEDICATIONS:    Current Outpatient Medications   Medication Sig Dispense Refill    acetaminophen (TYLENOL) 325 MG tablet Take 2 tablets (650 mg total) by mouth every 6 (six) hours as needed. 13 tablet 0    albuterol (PROVENTIL/VENTOLIN HFA) 90 mcg/actuation inhaler INHALE 2 PUFFS BY MOUTH EVERY 6 HOURS AS NEEDED FOR WHEEZING OR  SHORTNESS  OF  BREATH 54 g 3    atorvastatin (LIPITOR) 40 MG tablet Take 40 mg by mouth once daily.      BD INSULIN PEN NEEDLE UF SHORT 31 gauge x 5/16" Ndle       BD INSULIN SYRINGE ULTRA-FINE 1/2 mL 31 gauge x 15/64" Syrg       blood sugar diagnostic Strp To check BG 3 times daily, to use with insurance preferred meter 200 strip 11    blood-glucose meter kit To check BG 3 times daily, to use with insurance preferred meter 1 each 0    carvedilol (COREG) 25 MG tablet Take 1 tablet (25 mg total) by mouth 2 (two) times daily. 180 tablet 0    ceramides 1,3,6-II (CERAVE DAILY MOISTURIZING) Lotn Apply twice daily to skin 355 mL 1    cinnamon bark 500 mg capsule Take 1,000 mg by mouth once daily.        clopidogreL (PLAVIX) 75 mg tablet Take 75 mg by mouth.      diclofenac sodium (VOLTAREN) 1 % Gel Apply 2 g topically 2 (two) times daily. 100 g 0    diclofenac sodium (VOLTAREN) 1 % Gel Apply 2 g topically 3 (three) times daily. 50 g 0    DULoxetine (CYMBALTA) 60 MG capsule Take 1 capsule (60 mg total) by mouth once daily. 90 capsule 0    ENTRESTO  mg per tablet Take 1 tablet by mouth 2 (two) times daily.      fluticasone propionate (FLONASE) 50 mcg/actuation nasal spray 1 spray (50 mcg total) by Each Nostril route 2 (two) times daily. 18.2 mL 3    furosemide (LASIX) 40 MG tablet Take 40 mg by mouth once daily.      gabapentin (NEURONTIN) 300 MG " "capsule Take 4 capsules (1,200 mg total) by mouth 2 (two) times daily. (Patient taking differently: Take 900 mg by mouth 2 (two) times daily.) 540 capsule 1    glimepiride (AMARYL) 4 MG tablet Take 4 mg by mouth daily with breakfast.       HYDROcodone-acetaminophen (NORCO) 5-325 mg per tablet Take 1 tablet by mouth every 8 (eight) hours as needed for Pain. 60 tablet 0    [START ON 12/25/2023] HYDROcodone-acetaminophen (NORCO) 5-325 mg per tablet Take 1 tablet by mouth every 8 (eight) hours as needed for Pain. 60 tablet 0    hydrOXYzine HCL (ATARAX) 25 MG tablet Take 1 tablet (25 mg total) by mouth 3 (three) times daily as needed for Itching or Anxiety. 90 tablet 3    insulin NPH-insulin regular, 70/30, (NOVOLIN 70/30) 100 unit/mL (70-30) injection Inject into the skin. Inject 10 units at breakfast and inject 10 units at night      insulin syringe-needle U-100 0.3 mL 31 gauge x 15/64" Syrg USE TO INJECT INSULIN THREE TIMES DAILY      insulin syringe-needle U-100 1/2 mL 31 x 5/16" Syrg       ipratropium (ATROVENT) 42 mcg (0.06 %) nasal spray 2 sprays by Nasal route every 6 (six) hours as needed for Rhinitis (use as needed for runny nose and also use 15 minutes prior to meal). Clean nose with saline prior to use 15 mL 3    isosorbide mononitrate (IMDUR) 30 MG 24 hr tablet Take 30 mg by mouth once daily.      lancets Misc To check BG 3 times daily, to use with insurance preferred meter 200 each 11    LIDOcaine (LIDODERM) 5 % Place 1 patch onto the skin once daily. Remove & Discard patch within 12 hours or as directed by MD 5 patch 1    meclizine (ANTIVERT) 12.5 mg tablet Take 1 tablet (12.5 mg total) by mouth 3 (three) times daily as needed for Dizziness. 90 tablet 0    methocarbamoL (ROBAXIN) 500 MG Tab TAKE 1 TABLET BY MOUTH 4 TIMES DAILY FOR 10 DAYS 60 tablet 0    nitroGLYCERIN (NITROSTAT) 0.4 MG SL tablet DISSOLVE 1 TABLET UNDER THE TONGUE EVERY 5 MINUTES AS  NEEDED FOR CHEST PAIN. MAX  OF 3 TABLETS IN 15 MINUTES. " "CALL 911 IF PAIN PERSISTS.      pravastatin (PRAVACHOL) 20 MG tablet Take 1 tablet (20 mg total) by mouth once daily. 90 tablet 1    spironolactone (ALDACTONE) 25 MG tablet Take 25 mg by mouth.      tiotropium-olodateroL (STIOLTO RESPIMAT) 2.5-2.5 mcg/actuation Mist Inhale 1 puff into the lungs once daily. Controller 1 g 11    triamcinolone acetonide 0.1% (KENALOG) 0.1 % cream SMARTSI Application Topical 2-3 Times Daily      valsartan (DIOVAN) 80 MG tablet Take 80 mg by mouth.      VIT C/VIT E AC/LUT/COPPER/ZINC (PRESERVISION LUTEIN ORAL) Take by mouth.      warfarin (COUMADIN) 5 MG tablet Take 2 tabs by mouth on Tuesday,Thursday, Saturday and Sundays then 1.5 on all other days.       No current facility-administered medications for this visit.                  REVIEW OF SYSTEMS:     Pulmonary related symptoms as per HPI.  Gen:  no weight loss, no fever, no night sweat  HEENT:  + visual disturbance from macular degenration, no sore throat, + hearing acuity  CV:  No chest pain, no orthopnea, no PND  GI:  no melena, no hematochezia, no diarhea, no constipation.  Hernia pain left groin.  :  no dysuria, no hematuria, no hesistancy, no dribbling  Neuro:  no syncope, no vertigo, no tinitus  Psych:  No homocide or suicide ideation; no depression.  Endocrine:  No heat or cold intolerance.  Sleep:  No snoring; no witnessed apnea.  Otherwise, a balance of systems reviewed is negative.                 PHYSICAL EXAM:  Vitals:    23 0841   BP: (!) 93/59   Pulse: 66   SpO2: (!) 92%   Weight: 88.9 kg (196 lb)   Height: 5' 8" (1.727 m)   PainSc:   4   PainLoc: Generalized     Body mass index is 29.8 kg/m².     Physical Exam:   General: NAD, cooperative & interactive.  HEENT: AT/NC, PERRL, EOMI, nasal and oral mucosa moist. Normal dentition. Oropharynx without exudate.   Neck: Supple without JVD. Trachea midline. Thyroid feels normal.   Cardiac: S1S2 with brisk cap refill and symmetric pulses in distal " extremities.  Respiratory: Normal inspection. Symmetric chest rise. Normal palpation and percussion. Auscultation clear bilaterally. No increased work of breathing noted.   Abdomen: Soft, NT/ND. +BS. No palpable masses. No hepatosplenomegaly.   Lymphatic: No palpable supraclavicular or cervical LAD.   Extremities: Normal strength and tone (grossly). No visible atrophy. No clubbing or cyanosis on nail exam.   Skin: Warm and dry without visible rash.   Neuro: Grossly intact to brief exam. Oriented with appropriate mood & affect.       LABS  Pulmonary Functions Testing Results:    1/31/13 tlc 93%; dlco 46%  4/8/14 ratio 67%; fvc 76%; fve 73%  6/14/19 ratio of 48%; fvc 85%; fev1 55%  PFT 5/15/20 ratio of 54%; fev1 1.6 L (57%); fvc 2.97 L ( 80%); tlc.82 L( 77%); dlco 8.23 (34%)  PFT 8/29/22 Ratio of 48%; FVC 2.96 L (81%); FEV1 1.42 L (52%); TLC 4.92 L (79%); dlco 7.57 (32%)         6 min 5/15/20 165 96-89-91 on room     ABG: none  CXR:   Lungs clear  CT CHEST:  2/15/19 rll ggo and tib in lingula.    5/4/19 (personally reviewed) increased reticulation at bases; similar to prior ct 2/15/19; +emphysematous changes.      PPD none          Split night study from  3/21/2017: AHI 31.7, Effective control acieved with cpap 14 cm H2O  CPAP titration study 8/17/2019:  Effective control of sleep disordered breathing was achieved with BPAP at 16/10 cm of water.     2-d echo 12/8/20  TDS     Increased left ventricular wall thickness, decreased left ventricular systolic function.     Left ventricular ejection fraction is estimated at 30-40 %.  Recommend MUGA     Indeterminate diastolic function.     Normal right ventricular size, decreased right ventricular systolic function.     Right ventricular systolic pressure 69.7 mmHg.  Severe pulmonary hypertension.     Mildly increased left atrial size.     Mitral annular calcification.  Mild mitral valve regurgitation.     Aortic valve sclerosis.     bnp 8/5/10 134;1/24/13 164    6 min walk  350 meters; no significant desaturation    ASSESSMENT  Problem List Items Addressed This Visit       Chronic combined systolic and diastolic heart failure    Overview     -EF 30-40%  -entresto and follow by Dr. Valderrama.           COPD with chronic bronchitis and emphysema    Overview     - Quit smoking since 1960s.   -fev1 52%  -Patient is up to date with vaccination.    -Declines pulmonary rehab  -On home oxygen which helps  -continue with Stiolto  -walker and wheelchair dependant.          Goals of care, counseling/discussion    Overview     5/1/23  Advance Care Planning   During this visit, I engaged the patient in the advance care planning process.  The patient and I reviewed the role for advance directives and their purpose in directing future healthcare if the patient's unable to speak for him/herself.  At this point in time, the patient does have full decision-making capacity.  We discussed different extreme health states that patient could experience, and reviewed what kind of medical care patient would want in those situations.  The patient communicated that if he was comatose and had little chance of a meaningful recovery, he would NOT want machines/life-sustaining treatments used.  In addition to the above preference, she/he patient  HAS completed a living will to reflect these preferences.  The patient HAS already designated, Kalyani Felder, as healthcare power of  to make decisions on her behalf.  In the case of cardiopulmonary arrest, patient does wish for CPR, intubation or cardioversion.            ILD (interstitial lung disease)    Overview     -Emphysematous changes on ct along with basilar reticulation.  pft with moderate obstructive and mild restrictive physiology.  dlco significantly reduced (pt on home oxygen)  -Monitor lung function stable fvc and tlc         Obstructive sleep apnea treated with BiPAP - Primary    Overview     -ahi of 31.7.  Patient is using and benefiting from bipap  16/10.            Relevant Orders    CPAP/BIPAP SUPPLIES     Other Visit Diagnoses       COPD exacerbation        Relevant Medications    albuterol (PROVENTIL/VENTOLIN HFA) 90 mcg/actuation inhaler                     Patient will No follow-ups on file.       25 minutes of total time spent on the encounter, which includes face to face time and non-face to face time preparing to see the patient (eg, review of tests), Obtaining and/or reviewing separately obtained history, documenting clinical information in the electronic or other health record, independently interpreting results (not separately reported) and communicating results to the patient/family/caregiver, or Care coordination (not separately reported).

## 2023-12-20 LAB
ALBUMIN CREATININE RATIO: 65 MG/G
ALBUMIN, URINE: 6.8 MG/L
CHOL/HDLC RATIO: 3.5
CHOLESTEROL, TOTAL: 102
CREATININE RANDOM URINE: 105 MG/DL (ref 20–320)
EGFR: 50 ML/MIN/1.73M2
HBA1C MFR BLD: 10.7 % (ref 4–5.6)
HDLC SERPL-MCNC: 29 MG/DL
LDLC SERPL CALC-MCNC: 56 MG/DL
NONHDLC SERPL-MCNC: 73 MG/DL
TRIGL SERPL-MCNC: 91 MG/DL

## 2024-01-02 ENCOUNTER — TELEPHONE (OUTPATIENT)
Dept: FAMILY MEDICINE | Facility: CLINIC | Age: 84
End: 2024-01-02

## 2024-01-02 NOTE — TELEPHONE ENCOUNTER
----- Message from Rosanna Soria sent at 1/2/2024  3:01 PM CST -----  Regarding: concerns  Name of Who is Calling: Percy           What is the request in detail: Patient is requesting a call back in regards to him starting to drop things out of his hands or his hand will jerk.            Can the clinic reply by [facility]: No           What Number to Call Back if not in [facility]:  [phone]

## 2024-01-02 NOTE — TELEPHONE ENCOUNTER
----- Message from Jennifer Thomas sent at 4/3/2018  8:51 AM CDT -----  Contact: self  Pt states CPAP Machine went out and he was unable to use it last night. He is asking for a call back at  [phone].  
Order signed.  
Spoke w/patient, requesting a new CPAP machine w/supplies, states his machine is not heating the water and the last time it was sevices, they kept it for 3 months and has not really worked properly. OV 7/9/18.  
Orthopedic

## 2024-01-03 DIAGNOSIS — J44.89 COPD WITH CHRONIC BRONCHITIS AND EMPHYSEMA: ICD-10-CM

## 2024-01-03 DIAGNOSIS — J43.9 COPD WITH CHRONIC BRONCHITIS AND EMPHYSEMA: ICD-10-CM

## 2024-01-03 NOTE — TELEPHONE ENCOUNTER
Message sent to Dr. Alexandre, patient states it will be cheaper using 2 puffs daily please send to Nishi Juares MA  Pulm/Sleep [facility] [place]  [phone]                     ----- Message from Дмитрий Bates sent at 1/3/2024 10:38 AM CST -----  Regarding: Percy  Type: Patient Callback     Who called: Percy     What is the request in detail: Pt stated that he needs to talk to the doctors nurse about changing his medication. Please reach out to the patient.     Can the clinic reply by [facility]? Yes    Would the patient rather a call back or a response via [facility]? Callback     Best call back number: .[phone]      Additional Information:

## 2024-01-04 RX ORDER — TIOTROPIUM BROMIDE AND OLODATEROL 3.124; 2.736 UG/1; UG/1
2 SPRAY, METERED RESPIRATORY (INHALATION) DAILY
Qty: 1 G | Refills: 11 | Status: SHIPPED | OUTPATIENT
Start: 2024-01-04

## 2024-01-05 ENCOUNTER — OFFICE VISIT (OUTPATIENT)
Dept: PSYCHIATRY | Facility: CLINIC | Age: 84
End: 2024-01-05
Payer: MEDICARE

## 2024-01-05 VITALS
DIASTOLIC BLOOD PRESSURE: 67 MMHG | HEART RATE: 81 BPM | WEIGHT: 174.19 LBS | OXYGEN SATURATION: 89 % | SYSTOLIC BLOOD PRESSURE: 105 MMHG | BODY MASS INDEX: 26.48 KG/M2

## 2024-01-05 DIAGNOSIS — F33.0 MAJOR DEPRESSIVE DISORDER, RECURRENT EPISODE, MILD: Primary | ICD-10-CM

## 2024-01-05 DIAGNOSIS — G31.84 MCI (MILD COGNITIVE IMPAIRMENT): ICD-10-CM

## 2024-01-05 DIAGNOSIS — F41.1 GAD (GENERALIZED ANXIETY DISORDER): ICD-10-CM

## 2024-01-05 PROCEDURE — 90833 PSYTX W PT W E/M 30 MIN: CPT | Mod: S$GLB,,,

## 2024-01-05 PROCEDURE — 99999 PR PBB SHADOW E&M-EST. PATIENT-LVL IV: CPT | Mod: PBBFAC,,,

## 2024-01-05 PROCEDURE — 99215 OFFICE O/P EST HI 40 MIN: CPT | Mod: S$GLB,,,

## 2024-01-05 RX ORDER — DULOXETIN HYDROCHLORIDE 60 MG/1
60 CAPSULE, DELAYED RELEASE ORAL DAILY
Qty: 90 CAPSULE | Refills: 3 | Status: SHIPPED | OUTPATIENT
Start: 2024-01-05 | End: 2024-12-30

## 2024-01-05 NOTE — PROGRESS NOTES
"Outpatient Psychiatry Follow-Up Visit   1/5/2024    Clinical Status of Patient:  Outpatient (Ambulatory)    Chief Complaint:  Percy Ramirez is a 83 y.o. male who presents today for follow-up of depression and anxiety.  Met with patient.      Interval History and Content of Current Session 01/05/2024:  Pt is A+Ox 4.  Patients mood is "good", affect appears congruent and appropriate. Pts thought process is normal and logical.  Pts speech is normal tone, normal rate, normal pitch, normal volume   Linear and logical, friendly and cooperative, normal eye contact, no psychomotor retardation.  Pt is calmly seated in chair during interview. Pt is seen using walker in clinic. Pt is using portable O2, nasal canula.  O2 stat 89 in clinic with Oxygen.      Patient states that he has been doing well since our previous appointment. Continues to take Cymbalta 60MG QAM. Patient states that he attempted to get off of Cymbalta approximately 1 month ago and began experiencing fatigue and brain zaps. Patient resumed three days later. Patient states that he wishes to continue Cymbalta at this time period state said he's had a good holiday season, spent it with family. Currently disabled son lives with him and they have an OK relationship. Patient has been spending his free time going to the [employer]. Goes approximately twice a month. Patient is requesting refills at this time.      Reports depression today as 0/10, and anxiety as 0/10.  Pt reports taking medications as prescribed and behaving appropriately during interview today.  Denies SI/HI/AVH. Denies side effects of medications.  Pt reports sleeping well and normal appetite.   Denies recreational drug use. Pt reports 0 drinks per week, denies tobacco use, denies Vaping, none- Caffeine.      Interim Events: 6/16/2023  Pt is A+Ox 4.  Patients mood is "ok", affect appears congruent and appropriate. Pts thought process is normal and logical.  Pts speech is normal tone, normal rate, " "normal pitch, normal volume   Linear and logical, friendly and cooperative, normal eye contact, no psychomotor retardation.  Pt is calmly seated in chair during interview. Pt is casually dressed and well groomed.  Pt is seen using walker in clinic. Pt is using portable O2, nasal canula.  O2 stat 91 in clinic with Oxygen.      Patient states that he attempted to get off Cymbalta as instructed in clinic. 5 days after discontinuing Cymbalta Pts mood deteriorated: began experiencing anxiety, depression, and irritability. Patient states that he began taking Cymbalta again. Patient is currently taking Cymbalta 30MG QAM. Patient states that he now notices a difference when he was on Cymbalta 60 and 30, patient requesting to increase back to Cymbalta 60 at this time.     Patient states that his daughter and grandchildren are no longer living with them, currently living with daughter's boyfriend. Patient states that this has improved his mood. Patient continues to have disabled son living with him. Patient states that their relationship is strained but overall he enjoys having his son living with him.    Reports depression today as 3/10, and anxiety as 3/10.  Pt reports taking medications as prescribed and behaving appropriately during interview today.  Denies SI/HI/AVH. Denies side effects of medications.  Pt reports sleeping well and normal appetite.   Denies recreational drug use. Pt reports 0 drinks per week, denies tobacco use, denies Vaping, none- Caffeine.        Prior visit :  Psych Interview 03/17/2023:   Percy Ramirez is a 82 y.o. male with past psychiatric history of MDD and MCI  presented to for initial evaluation and treatment for mood.     Pt is A+Ox 4.  Patients mood is "ok", affect appears congruent and appropriate. Pts thought process is tangential.  Pts speech is normal tone, normal rate, normal pitch, normal volume  Friendly and cooperative, good eye contact, no psychomotor retardation.  Pt is calmly " seated in chair during interview.  Pt is casually dressed and well groomed.  Patient arrives 2 hours early to appointment due to transportation.  Pt is attached to oxygen and is using a Walker to ambulate in clinic. States that at home he uses the walker. No current history of falls.  Patient is hard of hearing and sight.     Patient was previously seeing seen by Isabella Pollard for depression , states that he is currently taking Cymbalta 60 daily. States that he's been doing OK since their last visit and July 2022. States that he does not feel different on Cymbalta 60.  Patient currently has his son , daughter, and her two kids living with him. Patient states that he has a house full of people and is Lonely.  Pt states that his biggest stressor currently is his son, states that he is disabled and refuses to work. States that son will stay in his room all day and not assist with household chores.  the patient has not been to talk therapy call mom at this time patient states that he is not interested.  Pt has a long history of Med noncompliance.       Patient states that he has experienced a lot of loss in his life, states that he watched his dad die from my massive heart attack at 13 year old, his wife passed away in 2013, and his oldest son passed away in 2014. Patient endorses a history of trauma while growing up, states that after his father passed away his mother send him to a [facility] group home because she could not take care of him. Patient states that the group home was very strict and he was required to look there for four years.      Denies prior hx of psychiatric hospitalizations. Denies hx of suicide attempts. Pt denies hx self harm. Pt denies hx hallucinations.  Pt denies hx of eating disorders.   Pt Endorses hx trauma. Denies physical/sexual abuse. Pt denies symptoms including nightmares, hypervigilance, flashbacks, avoidance behaviors, and disassociation.     Reports depression today as 1/10, and  "anxiety as 2/10.  Reports sleeping 3 hrs per night, and poor appetite.   Denies SI/HI/AVH. Denies side effects of medications.  Pt states that there support consists of "no one"  Denies recreational drug use. Pt reports 0 drinks per week, denies tobacco use, denies Vaping, none- Caffeine.       Current Medication:  Cymbalta 60mg daily      Past meds: none     DX:  The patient complained of depressed mood with lethargy, decreased appetite , insomnia, psycho-motor retardation, anhedonia, apathy, worsening self-esteem, guilt, decreased concentration and ability to make decisions     Pt denies hx symptoms/episodes of yulia.     Admits to symptoms of anxiety including excessive anxiety/worry/fear, more days than not, about numerous issues, difficulty controlling the worry, over thinking, rumination, restlessness, poor concentration, fatigue, and increased irritability. Denies panic attacks at this time.     Past Psychiatric History:   Previous Psychiatric Hospitalizations: NO  Previous Medication Trials: NO  History of psychotherapy:  NO  Previous Suicide Attempts: NO  History of Violence:  NO  History of physical/sexual abuse: NO  Outpatient psychiatric provider(past): YES:         Substance Abuse History:   Tobacco: NO  Alcohol: NO  Illicit Substances: NO  Detox/Rehab: NO     Neurological History:   Seizures: NO  Head trauma: NO     Family Psychiatric History: Yes -   Mother - depression , anxiety, substance abuse     Social History:  Developmental/Childhood:Achieved all developmental milestones timely  *Education: 9th grade  Employment Status/Finances:Retired   Relationship Status/Sexual Orientation:    Children: 2  Housing Status: Home    history:  YES:      Access to gun: YES: not locked up     Temple:Non-practicing  Recreational activities: Nothing   Person patient is closest to/confides in: No one     Legal History:   Past Charges/Incarcerations:  No      Review of Systems     Review of Systems " "  Constitutional:  Negative for weight loss.   HENT:  Negative for tinnitus.    Eyes:  Negative for blurred vision.   Respiratory:  Negative for cough and shortness of breath.    Cardiovascular:  Negative for chest pain.   Gastrointestinal:  Negative for abdominal pain.   Genitourinary:  Negative for dysuria.   Musculoskeletal:  Negative for back pain and neck pain.   Skin:  Negative for rash.   Neurological:  Negative for dizziness, seizures and weakness.   Psychiatric/Behavioral:  Negative for depression, hallucinations, memory loss, substance abuse and suicidal ideas. The patient is not nervous/anxious and does not have insomnia.        Psychiatric Review Of Systems - Is patient experiencing or having changes in:  sleep: no  appetite: no  weight: no  energy/anergy: no  interest/pleasure/anhedonia: no  somatic symptoms: no  libido: no  anxiety/panic: no  guilty/hopelessness: no  concentration: no  S.I.B.s/risky behavior: no  Irritability: no  Racing thoughts: no  Impulsive behaviors: no  Paranoia: no  AVH: no      Past Medical, Family and Social History: The patient's past medical, family and social history have been reviewed and updated as appropriate within the electronic medical record - see encounter notes.      Current Medications:   Medication List with Changes/Refills   Current Medications    ACETAMINOPHEN (TYLENOL) 325 MG TABLET    Take 2 tablets (650 mg total) by mouth every 6 (six) hours as needed.    ALBUTEROL (PROVENTIL/VENTOLIN HFA) 90 MCG/ACTUATION INHALER    INHALE 2 PUFFS BY MOUTH EVERY 6 HOURS AS NEEDED FOR WHEEZING OR  SHORTNESS  OF  BREATH    ATORVASTATIN (LIPITOR) 40 MG TABLET    Take 40 mg by mouth once daily.    BD INSULIN PEN NEEDLE UF SHORT 31 GAUGE X 5/16" NDLE        BD INSULIN SYRINGE ULTRA-FINE 1/2 ML 31 GAUGE X 15/64" SYRG        BLOOD SUGAR DIAGNOSTIC STRP    To check BG 3 times daily, to use with insurance preferred meter    BLOOD-GLUCOSE METER KIT    To check BG 3 times daily, to " "use with insurance preferred meter    CARVEDILOL (COREG) 25 MG TABLET    Take 1 tablet (25 mg total) by mouth 2 (two) times daily.    CERAMIDES 1,3,6-II (CERAVE DAILY MOISTURIZING) LOTN    Apply twice daily to skin    CINNAMON BARK 500 MG CAPSULE    Take 1,000 mg by mouth once daily.      CLOPIDOGREL (PLAVIX) 75 MG TABLET    Take 75 mg by mouth.    DICLOFENAC SODIUM (VOLTAREN) 1 % GEL    Apply 2 g topically 2 (two) times daily.    DICLOFENAC SODIUM (VOLTAREN) 1 % GEL    Apply 2 g topically 3 (three) times daily.    ENTRESTO  MG PER TABLET    Take 1 tablet by mouth 2 (two) times daily.    FLUTICASONE PROPIONATE (FLONASE) 50 MCG/ACTUATION NASAL SPRAY    1 spray (50 mcg total) by Each Nostril route 2 (two) times daily.    FUROSEMIDE (LASIX) 40 MG TABLET    Take 40 mg by mouth once daily.    GABAPENTIN (NEURONTIN) 300 MG CAPSULE    Take 4 capsules (1,200 mg total) by mouth 2 (two) times daily.    GLIMEPIRIDE (AMARYL) 4 MG TABLET    Take 4 mg by mouth daily with breakfast.     HYDROCODONE-ACETAMINOPHEN (NORCO) 5-325 MG PER TABLET    Take 1 tablet by mouth every 8 (eight) hours as needed for Pain.    INSULIN NPH-INSULIN REGULAR, 70/30, (NOVOLIN 70/30) 100 UNIT/ML (70-30) INJECTION    Inject into the skin. Inject 10 units at breakfast and inject 10 units at night    INSULIN SYRINGE-NEEDLE U-100 0.3 ML 31 GAUGE X 15/64" SYRG    USE TO INJECT INSULIN THREE TIMES DAILY    INSULIN SYRINGE-NEEDLE U-100 1/2 ML 31 X 5/16" SYRG        IPRATROPIUM (ATROVENT) 42 MCG (0.06 %) NASAL SPRAY    2 sprays by Nasal route every 6 (six) hours as needed for Rhinitis (use as needed for runny nose and also use 15 minutes prior to meal). Clean nose with saline prior to use    ISOSORBIDE MONONITRATE (IMDUR) 30 MG 24 HR TABLET    Take 30 mg by mouth once daily.    LANCETS MISC    To check BG 3 times daily, to use with insurance preferred meter    LIDOCAINE (LIDODERM) 5 %    Place 1 patch onto the skin once daily. Remove & Discard patch within " "12 hours or as directed by MD    MECLIZINE (ANTIVERT) 12.5 MG TABLET    Take 1 tablet (12.5 mg total) by mouth 3 (three) times daily as needed for Dizziness.    METHOCARBAMOL (ROBAXIN) 500 MG TAB    TAKE 1 TABLET BY MOUTH 4 TIMES DAILY FOR 10 DAYS    NITROGLYCERIN (NITROSTAT) 0.4 MG SL TABLET    DISSOLVE 1 TABLET UNDER THE TONGUE EVERY 5 MINUTES AS  NEEDED FOR CHEST PAIN. MAX  OF 3 TABLETS IN 15 MINUTES. CALL 911 IF PAIN PERSISTS.    PRAVASTATIN (PRAVACHOL) 20 MG TABLET    Take 1 tablet (20 mg total) by mouth once daily.    SPIRONOLACTONE (ALDACTONE) 25 MG TABLET    Take 25 mg by mouth.    TIOTROPIUM-OLODATEROL (STIOLTO RESPIMAT) 2.5-2.5 MCG/ACTUATION MIST    Inhale 2 puffs into the lungs once daily. Controller    TRIAMCINOLONE ACETONIDE 0.1% (KENALOG) 0.1 % CREAM    SMARTSI Application Topical 2-3 Times Daily    VALSARTAN (DIOVAN) 80 MG TABLET    Take 80 mg by mouth.    VIT C/VIT E AC/LUT/COPPER/ZINC (PRESERVISION LUTEIN ORAL)    Take by mouth.    WARFARIN (COUMADIN) 5 MG TABLET    Take 2 tabs by mouth on Tuesday,Thursday, Saturday and Sundays then 1.5 on all other days.   Changed and/or Refilled Medications    Modified Medication Previous Medication    DULOXETINE (CYMBALTA) 60 MG CAPSULE DULoxetine (CYMBALTA) 60 MG capsule       Take 1 capsule (60 mg total) by mouth once daily.    Take 1 capsule (60 mg total) by mouth once daily.   Discontinued Medications    HYDROXYZINE HCL (ATARAX) 25 MG TABLET    Take 1 tablet (25 mg total) by mouth 3 (three) times daily as needed for Itching or Anxiety.         Allergies:   Review of patient's allergies indicates:   Allergen Reactions    Aricept odt [donepezil] Diarrhea and Nausea And Vomiting    Codeine Nausea Only     weak    Ranexa [ranolazine]          Vitals   Vitals:    24 1107   BP: 105/67   Pulse: 81            Labs/Imaging/Studies:   No results found for this or any previous visit (from the past 48 hour(s)).   No results found for: "PHENYTOIN", "PHENOBARB", " ""VALPROATE", "CBMZ"    Compliance: yes    Side effects: None    Risk Parameters:  Patient reports no suicidal ideation  Patient reports no homicidal ideation  Patient reports no self-injurious behavior  Patient reports no violent behavior    Exam (detailed: at least 9 elements; comprehensive: all 15 elements)   Constitutional  Vitals:  Most recent vital signs, dated less than 90 days prior to this appointment, were reviewed.   Vitals:    01/05/24 1107   BP: 105/67   Pulse: 81   SpO2: (!) 89%   Weight: 79 kg (174 lb 2.6 oz)          General:  unremarkable, age appropriate     Musculoskeletal  Muscle Strength/Tone:  no spasicity, no rigidity, no cogwheeling, no flaccidity, no paratonia, no dyskinesia, no dystonia, no tremor, no tic, no choreoathetosis, no atrophy   Gait & Station:  uses walker     Psychiatric  Speech:  no latency; no press   Mood & Affect:  steady  congruent and appropriate   Thought Process:  normal and logical   Associations:  intact   Thought Content:  normal, no suicidality, no homicidality, delusions, or paranoia   Insight:  intact, has awareness of illness   Judgement: behavior is adequate to circumstances, age appropriate   Orientation:  grossly intact, person, place, situation, time/date   Memory: intact for content of interview, grossly intact, memory >3 objects at five mins   Language: grossly intact, able to name, able to repeat   Attention Span & Concentration:  able to focus, completed tasks   Fund of Knowledge:  intact and appropriate to age and level of education, familiar with aspects of current personal life     Assessment and Diagnosis   Status/Progress: Based on the examination today, the patient's problem(s) is/are resolving.  New problems have not been presented today.       General Impression:      ICD-10-CM ICD-9-CM   1. Major depressive disorder, recurrent episode, mild  F33.0 296.31   2. MCI (mild cognitive impairment)  G31.84 331.83   3. GERALD (generalized anxiety disorder)  " F41.1 300.02         Intervention/Counseling/Treatment Plan   Mood   Continue Cymbalta 60mg QAM - Targeting anxiety and depression  Pt states that his mood was better on Cymbalta, requesting increase to original dose   Labs reviewed, Nico function looks ok.  No concerns at tis time     Patient states that at this time he does not want to see a talk therapist, I discussed extensively with patient that I believe this would be beneficial in coping with the current stressors he is experiencing. Will reassess that next appointment.       Vitals in clinic   O2 Stat 89% on oxygen.  Pt has COPD. Not in distress.    In clinic Pt denies HA, SOB, Leg swelling, leg pain, CP, changes in vision         Discussed diagnosis, risks and benefits of proposed treatment above vs alternative treatments vs no treatment, and potential side effects of these treatments, and the inherent unpredictability of individual response to treatment.  The patient expresses understanding and gives informed consent to pursue treatment.  The potential benefits outweigh the potential risks. Patient has no other questions. Risks/adverse effects discussed at this time including but not limited to: GI side effects, sexual dysfunction, activation vs sedation, triggering of suicidal thoughts, and serotonin syndrome.    Serotonin syndrome   Mental status changes can include anxiety, restlessness, disorientation, and agitated delirium.    Autonomic manifestations can include diaphoresis, tachycardia, hyperthermia, hypertension, vomiting, and diarrhea   Neuromuscular hyperactivity can manifest as tremor, myoclonus, hyperreflexia, rigidity, hyperthermia, seizure, and bilateral Babinski sign.   Pt was informed that if they experience any of these symptoms to go the ED.     Difficulty Sleeping Behavioral Modification:  Implement stimulus control: [place] bedroom for sleep only. Leave bedroom when frustrated from not sleeping. Engage in relaxation before returning.  Engage in activities during the day. AVOID >7-8 h time in bed  Avoid clock watching  Avoid thinking/worrying about sleep when trying to fall asleep  Limit caffeinated consumption  Make sure the bedroom is dark, quiet and cool    Safety Plan   Patient voices understanding and agreement with this plan  Provided crisis numbers  Encouraged patient to keep future appointments.  Instructed patient to call or message with questions or concerns  In the event of an emergency, including suicidal ideation, patient was advised to go to the emergency room and/or call 911    Return to Clinic: 3 months    Psychotherapy:  Target symptoms: depression, anxiety   Why chosen therapy is appropriate versus another modality: relevant to diagnosis, evidence based practice  Outcome monitoring methods: self-report, observation  Therapeutic intervention type: insight oriented psychotherapy, interactive psychotherapy  Topics discussed/themes: relationships difficulties, building skills sets for symptom management, symptom recognition  The patient's response to the intervention is guarded. The patient's progress toward treatment goals is fair.   Duration of intervention: 16 minutes.    Total face to face time: 50 min  Total time (chart review, patient contact, documentation): 66 min    A diagnostic psychiatric evaluation was performed and responsiveness to treatment was assessed.  The patient demonstrates adequate ability/capacity to respond to treatment.    Lauro Parada PA-C    *This note has been prepared using a combination of a dictation device and typing.  It has been checked for errors but some errors may still exist within the note as a result of speech recognition errors and/or typographical errors.

## 2024-01-10 LAB
LEFT EYE DM RETINOPATHY: POSITIVE
RIGHT EYE DM RETINOPATHY: POSITIVE

## 2024-01-16 DIAGNOSIS — M48.062 SPINAL STENOSIS OF LUMBAR REGION WITH NEUROGENIC CLAUDICATION: ICD-10-CM

## 2024-01-16 DIAGNOSIS — M47.816 LUMBAR SPONDYLOSIS: ICD-10-CM

## 2024-01-16 DIAGNOSIS — M54.16 LUMBAR RADICULOPATHY: ICD-10-CM

## 2024-01-16 DIAGNOSIS — M51.36 DDD (DEGENERATIVE DISC DISEASE), LUMBAR: ICD-10-CM

## 2024-01-16 NOTE — TELEPHONE ENCOUNTER
----- Message from Evangelina Resendez sent at 1/16/2024 12:18 PM CST -----  .Type: RX Refill Request    Who Called: Self     Have you contacted your pharmacy: Yes     Refill or New Rx: New Rx     RX Name and Strength:HYDROcodone-acetaminophen (NORCO) 5-325 mg per tablet    Preferred Pharmacy with phone number:.  [id number]  [place] - [address]  [address]  [place] [address]  Phone: [phone] Fax: [phone]      Local or Mail Order: Local     Ordering Provider: Tom    Would the patient rather a call back or a response via [facility]? Call Back     Best Call Back Number: .[phone] (home)       Additional Information:

## 2024-01-17 DIAGNOSIS — M51.36 DDD (DEGENERATIVE DISC DISEASE), LUMBAR: ICD-10-CM

## 2024-01-17 DIAGNOSIS — M47.816 LUMBAR SPONDYLOSIS: ICD-10-CM

## 2024-01-17 DIAGNOSIS — M54.16 LUMBAR RADICULOPATHY: ICD-10-CM

## 2024-01-17 DIAGNOSIS — M48.062 SPINAL STENOSIS OF LUMBAR REGION WITH NEUROGENIC CLAUDICATION: ICD-10-CM

## 2024-01-17 RX ORDER — HYDROCODONE BITARTRATE AND ACETAMINOPHEN 5; 325 MG/1; MG/1
1 TABLET ORAL EVERY 8 HOURS PRN
Qty: 60 TABLET | Refills: 0 | OUTPATIENT
Start: 2024-01-17 | End: 2024-02-16

## 2024-01-17 RX ORDER — HYDROCODONE BITARTRATE AND ACETAMINOPHEN 5; 325 MG/1; MG/1
1 TABLET ORAL EVERY 8 HOURS PRN
Qty: 60 TABLET | Refills: 0 | Status: SHIPPED | OUTPATIENT
Start: 2024-01-25 | End: 2024-02-24

## 2024-01-17 NOTE — TELEPHONE ENCOUNTER
Confirmed with pt that he has not run out of medication, he was just ensuring he had next month's prescription available at pharmacy ahead of time.  Dr. Santos updated.

## 2024-01-18 ENCOUNTER — PATIENT OUTREACH (OUTPATIENT)
Dept: ADMINISTRATIVE | Facility: HOSPITAL | Age: 84
End: 2024-01-18
Payer: MEDICARE

## 2024-01-18 RX ORDER — METFORMIN HYDROCHLORIDE 500 MG/1
500 TABLET ORAL 2 TIMES DAILY
COMMUNITY
Start: 2023-11-08

## 2024-01-18 RX ORDER — COVID-19 VACCINE, MRNA 0.05 MG/.48ML
INJECTION, SUSPENSION INTRAMUSCULAR
COMMUNITY
Start: 2023-10-02 | End: 2024-05-15 | Stop reason: ALTCHOICE

## 2024-01-18 RX ORDER — INFLUENZA A VIRUS A/VICTORIA/4897/2022 IVR-238 (H1N1) ANTIGEN (FORMALDEHYDE INACTIVATED), INFLUENZA A VIRUS A/DARWIN/6/2021 IVR-227 (H3N2) ANTIGEN (FORMALDEHYDE INACTIVATED), INFLUENZA B VIRUS B/AUSTRIA/1359417/2021 BVR-26 ANTIGEN (FORMALDEHYDE INACTIVATED), INFLUENZA B VIRUS B/PHUKET/3073/2013 BVR-1B ANTIGEN (FORMALDEHYDE INACTIVATED) 15; 15; 15; 15 UG/.5ML; UG/.5ML; UG/.5ML; UG/.5ML
INJECTION, SUSPENSION INTRAMUSCULAR
COMMUNITY
Start: 2023-10-02 | End: 2024-05-15 | Stop reason: ALTCHOICE

## 2024-01-19 ENCOUNTER — TELEPHONE (OUTPATIENT)
Dept: FAMILY MEDICINE | Facility: CLINIC | Age: 84
End: 2024-01-19
Payer: MEDICARE

## 2024-01-19 ENCOUNTER — TELEPHONE (OUTPATIENT)
Dept: NEUROLOGY | Facility: CLINIC | Age: 84
End: 2024-01-19
Payer: MEDICARE

## 2024-01-19 DIAGNOSIS — R25.1 TREMOR OF BOTH HANDS: Primary | ICD-10-CM

## 2024-01-19 NOTE — TELEPHONE ENCOUNTER
----- Message from Rabia Pelaez sent at 1/19/2024  8:31 AM CST -----  Regarding: Self .[phone]  Type: Patient requesting referral     Who Called:Self     Referral to What Specialty:neurology     Reason for Referral: twitching in hands     Does the patient want the referral with a specific physician?: Dr Saldivar     [facility]     Would the patient rather a call back or a response via [facility]? Call back     Best Call Back Number:.[phone]      Additional Information:

## 2024-01-19 NOTE — TELEPHONE ENCOUNTER
spoke with patient, he would like for his ultrasound results that were ordered by Dr. Saldivar faxed over to his cardiologist, Iinformed the patient that I would make sure to fax the results over to the cardiologist that was provided by the patient.   ----- Message from Patricia Edwards MA sent at 1/19/2024  7:38 AM CST -----  Type: Patient Call Back    Who called: Self    What is the request in detail:is asking for the results of the test he had done sent to his cardiologist & to make sure his PCP has access to it ..     Can the clinic reply by [facility]?NO    Would the patient rather a call back or a response via [facility]? Yes, call     Best call back number: [phone] (home)

## 2024-01-30 ENCOUNTER — OFFICE VISIT (OUTPATIENT)
Dept: PODIATRY | Facility: CLINIC | Age: 84
End: 2024-01-30
Payer: MEDICARE

## 2024-01-30 VITALS
WEIGHT: 174.19 LBS | SYSTOLIC BLOOD PRESSURE: 96 MMHG | HEIGHT: 68 IN | DIASTOLIC BLOOD PRESSURE: 58 MMHG | HEART RATE: 79 BPM | BODY MASS INDEX: 26.4 KG/M2

## 2024-01-30 DIAGNOSIS — M20.42 HAMMER TOES OF BOTH FEET: ICD-10-CM

## 2024-01-30 DIAGNOSIS — M20.11 HALLUX ABDUCTO VALGUS, RIGHT: ICD-10-CM

## 2024-01-30 DIAGNOSIS — M20.12 HALLUX ABDUCTO VALGUS, LEFT: ICD-10-CM

## 2024-01-30 DIAGNOSIS — M20.41 HAMMER TOES OF BOTH FEET: ICD-10-CM

## 2024-01-30 DIAGNOSIS — E11.49 TYPE II DIABETES MELLITUS WITH NEUROLOGICAL MANIFESTATIONS: Primary | ICD-10-CM

## 2024-01-30 DIAGNOSIS — L84 CORN OR CALLUS: ICD-10-CM

## 2024-01-30 DIAGNOSIS — B35.1 ONYCHOMYCOSIS DUE TO DERMATOPHYTE: ICD-10-CM

## 2024-01-30 PROCEDURE — 11721 DEBRIDE NAIL 6 OR MORE: CPT | Mod: 59,Q9,S$GLB, | Performed by: PODIATRIST

## 2024-01-30 PROCEDURE — 99499 UNLISTED E&M SERVICE: CPT | Mod: S$GLB,,, | Performed by: PODIATRIST

## 2024-01-30 PROCEDURE — 99999 PR PBB SHADOW E&M-EST. PATIENT-LVL V: CPT | Mod: PBBFAC,,, | Performed by: PODIATRIST

## 2024-01-30 PROCEDURE — 11056 PARNG/CUTG B9 HYPRKR LES 2-4: CPT | Mod: Q9,S$GLB,, | Performed by: PODIATRIST

## 2024-01-30 NOTE — PATIENT INSTRUCTIONS
Over the counter pain creams: Voltaren Gel, Biofreeze, Bengay, tiger balm, two old goat, lidocaine gel,  Absorbine Veterinary Liniment Gel Topical Analgesic Sore Muscle and Joint Pain Relief    Recommend lotions: eucerin, eucerin for diabetics, aquaphor, A&D ointment, gold bond for diabetics, sween, Hodges's Bees all purpose baby ointment,  urea 40 with aloe or SkinIntegra rapid crack repair (found on amazon.com)    Shoe recommendations: (try 6pm.com, zappos.com , nordstromrack.PressBaby, or shoes.com for discounted prices) you can visit varsity shoes in [place], DSW shoes in [place]  or petar rack in the [facility] (there are also several shoe brand outlets in the [facility])    ONLY purchase stability style tennis shoes NO flex, foam, free, yoga mat style shoes    Shoe examples:    Asics (GT 4000 or gel foundations), new balance stability type shoes (such as the 940 series), saucony (stabil c3),  Mahoney (GTS or Beast or   transcend), propet, HokaOne (tennis shoe) Jose (tennis shoes and boots)    Choloft Lars (women) Gustabo&Billy (men), clarks, crocs, aerosoles, naturalizers, SAS, ecco, born, ngoc lara, rockports (dress shoes)    Vionic, burkenstocks, fitflops, propet, taos, baretraps (sandals)    HokaOne sandals, crocs, propet (house shoes)      Nail Home remedy:  Vicks Vapor rub or Emuaid to nails for easier manageability    Diabetes: Inspecting Your Feet  Diabetes increases your chances of developing foot problems. So inspect your feet every day. This helps you find small skin irritations before they become serious infections. If you have trouble seeing the bottoms of your feet, use a mirror or ask a family member or friend to help.     Pressure spots on the bottom of the foot are common areas where problems develop.   How to check your feet  Below are tips to help you look for foot problems. Try to check your feet at the same time each day, such as when you get out of bed in the morning:  Check the top of  each foot. The tops of toes, back of the heel, and outer edge of the foot can get a lot of rubbing from poor-fitting shoes.  Check the bottom of each foot. Daily wear and tear often leads to problems at pressure spots.  Check the toes and nails. Fungal infections often occur between toes. Toenail problems can also be a sign of fungal infections or lead to breaks in the skin.  Check your shoes, too. Loose objects inside a shoe can injure the foot. Use your hand to feel inside your shoes for things like alfredo, loose stitching, or rough areas that could irritate your skin.  Warning signs  Look for any color changes in the foot. Redness with streaks can signal a severe infection, which needs immediate medical attention. Tell your doctor right away if you have any of these problems:  Swelling, sometimes with color changes, may be a sign of poor blood flow or infection. Symptoms include tenderness and an increase in the size of your foot.  Warm or hot areas on your feet may be signs of infection. A foot that is cold may not be getting enough blood.  Sensations such as burning, tingling, or pins and needles can be signs of a problem. Also check for areas that may be numb.  Hot spots are caused by friction or pressure. Look for hot spots in areas that get a lot of rubbing. Hot spots can turn into blisters, calluses, or sores.  Cracks and sores are caused by dry or irritated skin. They are a sign that the skin is breaking down, which can lead to infection.  Toenail problems to watch for include nails growing into the skin (ingrown toenail) and causing redness or pain. Thick, yellow, or discolored nails can signal a fungal infection.  Drainage and odor can develop from untreated sores and ulcers. Call your doctor right away if you notice white or yellow drainage, bleeding, or unpleasant odor.   © 3563-1312 [employer]. [address] [place] [address], [place] [address]. All rights reserved. This information is not  intended as a substitute for professional medical care. Always follow your healthcare professional's instructions.        Step-by-Step:  Inspecting Your Feet (Diabetes)    Date Last Reviewed: 10/1/2016  © 9451-0367 [employer]. [address] [place] [address], [place] [address]. All rights reserved. This information is not intended as a substitute for professional medical care. Always follow your healthcare professional's instructions.

## 2024-01-31 NOTE — PROGRESS NOTES
Subjective:      Patient ID: Percy Ramirez is a 83 y.o. male.    Chief Complaint: No chief complaint on file.      Percy is a 83 y.o. male who presents to the clinic for evaluation and treatment of high risk feet. Percy has a past medical history of Allergy, Arthritis, CAD, multiple vessel, Cataract, Depression, History of colon polyps, Hyperlipidemia, Hypertension, Macular degeneration, S/P CABG x 2, and Type 2 diabetes mellitus with circulatory disorder. The patient's chief complaint is elongated, thickened toenails aggravated by increased weight bearing, shoe gear, pressure.  This patient has documented high risk feet requiring routine maintenance secondary to diabetes mellitis and those secondary complications of diabetes, as mentioned..    PCP: Kasie Edmondson MD    Date Last Seen by PCP:   No chief complaint on file.    Current shoe gear:  Velcro tennis shoes    Hemoglobin A1C   Date Value Ref Range Status   12/20/2023 10.7 (H) 4.0 - 5.6 % Final     Comment:     [facility]   09/19/2023 8.3 (H) 4.0 - 5.6 % Final     Comment:     [facility]   06/19/2023 8.5 (H) 4.0 - 5.6 % Final     Comment:     [facility]     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    Coronary artery disease    S/P CABG x 2    Macular degeneration    Obstructive sleep apnea treated with BiPAP    Anxiety state, unspecified    Insulin long-term use    Primary osteoarthritis of both knees    Chronic low back pain    DJD (degenerative joint disease), lumbar    Poorly controlled type 2 diabetes mellitus with neuropathy    Bilateral carotid artery disease    Hyperlipidemia LDL goal <100    Type 2 diabetes, with peripheral circulatory disorder not at goal    COPD with chronic bronchitis and emphysema    Atherosclerosis of abdominal aorta    Overweight (BMI 25.0-29.9)  "   Persistent atrial fibrillation    Chronic stable angina    Senile purpura    Goals of care, counseling/discussion    Osteoarthritis of carpometacarpal (CMC) joint of left thumb    MCI (mild cognitive impairment)    Dizziness and giddiness    Pulmonary nodule    Dyspnea on exertion    Chronic combined systolic and diastolic heart failure    Walker as ambulation aid    ILD (interstitial lung disease)    History of cardioversion    Erectile dysfunction    Secondary hyperparathyroidism of renal origin    Noncompliance with medication regimen    Exudative age-related macular degeneration, right eye, with active choroidal neovascularization    History of stroke    Chronic tension-type headache, intractable    Spinal stenosis of lumbar region with neurogenic claudication    Lumbar radiculopathy    Lumbar spondylosis    DDD (degenerative disc disease), lumbar    Cerebral atherosclerosis     Current Outpatient Medications on File Prior to Visit   Medication Sig Dispense Refill    acetaminophen (TYLENOL) 325 MG tablet Take 2 tablets (650 mg total) by mouth every 6 (six) hours as needed. 13 tablet 0    albuterol (PROVENTIL/VENTOLIN HFA) 90 mcg/actuation inhaler INHALE 2 PUFFS BY MOUTH EVERY 6 HOURS AS NEEDED FOR WHEEZING OR  SHORTNESS  OF  BREATH 54 g 3    atorvastatin (LIPITOR) 40 MG tablet Take 40 mg by mouth once daily.      BD INSULIN PEN NEEDLE UF SHORT 31 gauge x 5/16" Ndle       BD INSULIN SYRINGE ULTRA-FINE 1/2 mL 31 gauge x 15/64" Syrg       blood sugar diagnostic Strp To check BG 3 times daily, to use with insurance preferred meter 200 strip 11    carvedilol (COREG) 25 MG tablet Take 1 tablet (25 mg total) by mouth 2 (two) times daily. 180 tablet 0    ceramides 1,3,6-II (CERAVE DAILY MOISTURIZING) Lotn Apply twice daily to skin 355 mL 1    cinnamon bark 500 mg capsule Take 1,000 mg by mouth once daily.        clopidogreL (PLAVIX) 75 mg tablet Take 75 mg by mouth.      COMIRNATY 2023-24, 12Y UP,,PF, 30 mcg/0.3 mL " "injection       diclofenac sodium (VOLTAREN) 1 % Gel Apply 2 g topically 2 (two) times daily. 100 g 0    diclofenac sodium (VOLTAREN) 1 % Gel Apply 2 g topically 3 (three) times daily. 50 g 0    DULoxetine (CYMBALTA) 60 MG capsule Take 1 capsule (60 mg total) by mouth once daily. 90 capsule 3    ENTRESTO  mg per tablet Take 1 tablet by mouth 2 (two) times daily.      FLUAD QUAD 2023-24,65Y UP,,PF, 60 mcg (15 mcg x 4)/0.5 mL Syrg       fluticasone propionate (FLONASE) 50 mcg/actuation nasal spray 1 spray (50 mcg total) by Each Nostril route 2 (two) times daily. 18.2 mL 3    furosemide (LASIX) 40 MG tablet Take 40 mg by mouth once daily.      gabapentin (NEURONTIN) 300 MG capsule Take 4 capsules (1,200 mg total) by mouth 2 (two) times daily. (Patient taking differently: Take 900 mg by mouth 2 (two) times daily.) 540 capsule 1    glimepiride (AMARYL) 4 MG tablet Take 4 mg by mouth daily with breakfast.       HYDROcodone-acetaminophen (NORCO) 5-325 mg per tablet Take 1 tablet by mouth every 8 (eight) hours as needed for Pain. 60 tablet 0    insulin NPH-insulin regular, 70/30, (NOVOLIN 70/30) 100 unit/mL (70-30) injection Inject into the skin. Inject 10 units at breakfast and inject 10 units at night      insulin syringe-needle U-100 0.3 mL 31 gauge x 15/64" Syrg USE TO INJECT INSULIN THREE TIMES DAILY      insulin syringe-needle U-100 1/2 mL 31 x 5/16" Syrg       ipratropium (ATROVENT) 42 mcg (0.06 %) nasal spray 2 sprays by Nasal route every 6 (six) hours as needed for Rhinitis (use as needed for runny nose and also use 15 minutes prior to meal). Clean nose with saline prior to use 15 mL 3    isosorbide mononitrate (IMDUR) 30 MG 24 hr tablet Take 30 mg by mouth once daily.      lancets Misc To check BG 3 times daily, to use with insurance preferred meter 200 each 11    LIDOcaine (LIDODERM) 5 % Place 1 patch onto the skin once daily. Remove & Discard patch within 12 hours or as directed by MD Townsend patch 1    " meclizine (ANTIVERT) 12.5 mg tablet Take 1 tablet (12.5 mg total) by mouth 3 (three) times daily as needed for Dizziness. 90 tablet 0    metFORMIN (GLUCOPHAGE) 500 MG tablet Take 500 mg by mouth 2 (two) times daily.      methocarbamoL (ROBAXIN) 500 MG Tab TAKE 1 TABLET BY MOUTH 4 TIMES DAILY FOR 10 DAYS 60 tablet 0    nitroGLYCERIN (NITROSTAT) 0.4 MG SL tablet DISSOLVE 1 TABLET UNDER THE TONGUE EVERY 5 MINUTES AS  NEEDED FOR CHEST PAIN. MAX  OF 3 TABLETS IN 15 MINUTES. CALL 911 IF PAIN PERSISTS.      pravastatin (PRAVACHOL) 20 MG tablet Take 1 tablet (20 mg total) by mouth once daily. 90 tablet 1    spironolactone (ALDACTONE) 25 MG tablet Take 25 mg by mouth.      tiotropium-olodateroL (STIOLTO RESPIMAT) 2.5-2.5 mcg/actuation Mist Inhale 2 puffs into the lungs once daily. Controller 1 g 11    triamcinolone acetonide 0.1% (KENALOG) 0.1 % cream SMARTSI Application Topical 2-3 Times Daily      valsartan (DIOVAN) 80 MG tablet Take 80 mg by mouth.      VIT C/VIT E AC/LUT/COPPER/ZINC (PRESERVISION LUTEIN ORAL) Take by mouth.      warfarin (COUMADIN) 5 MG tablet Take 2 tabs by mouth on Tuesday,Thursday, Saturday and Sundays then 1.5 on all other days.      blood-glucose meter kit To check BG 3 times daily, to use with insurance preferred meter 1 each 0     No current facility-administered medications on file prior to visit.     Review of patient's allergies indicates:   Allergen Reactions    Codeine Nausea Only     weak     Past Surgical History:   Procedure Laterality Date    broken elbow      left    COLONOSCOPY N/A 10/4/2017    Procedure: COLONOSCOPY;  Surgeon: Gabriel Leung MD;  Location: [facility];  Service: Endoscopy;  Laterality: N/A;    EYE SURGERY      cataract    heart bypass      2004    HERNIA REPAIR      right    ROTATOR CUFF REPAIR      right  2004     Family History   Problem Relation Age of Onset    Arthritis Mother     Diabetes Mother     Stroke Mother     Heart attack Father     Cancer Brother      "Throat cancer Brother     Diabetes Maternal Aunt     Diabetes Maternal Uncle     Heart disease Maternal Uncle     Diabetes Paternal Aunt     Heart disease Paternal Aunt     Heart disease Paternal Uncle     Heart disease Maternal Grandmother     Cancer Maternal Grandfather      Social History     Socioeconomic History    Marital status:     Number of children: 4    Years of education: GED   Occupational History    Occupation: retired tug    Tobacco Use    Smoking status: Former     Current packs/day: 0.00     Average packs/day: 3.0 packs/day for 45.0 years (135.0 ttl pk-yrs)     Types: Cigarettes     Start date: 1959     Quit date: 2004     Years since quittin.7    Smokeless tobacco: Former   Substance and Sexual Activity    Alcohol use: Yes     Comment: was heavy drinker 35 years ago, rare use now    Drug use: No    Sexual activity: Yes     Partners: Female     Review of Systems   Constitution: Negative for chills, fever and weakness.   Cardiovascular: Negative for claudication and leg swelling.   Respiratory: Positive for cough. Negative for shortness of breath.    Skin: Positive for dry skin and nail changes. Negative for itching and rash.   Musculoskeletal: Positive for arthritis, back pain and joint pain. Negative for falls, joint swelling and muscle weakness.   Gastrointestinal: Negative for diarrhea, nausea and vomiting.   Neurological: Positive for numbness and paresthesias. Negative for tremors.   Psychiatric/Behavioral: Negative for altered mental status and hallucinations.           Objective:       Vitals:    24 0849   BP: (!) 96/58   Pulse: 79   Weight: 79 kg (174 lb 2.6 oz)   Height: 5' 8" (1.727 m)   PainSc: 0-No pain       Physical Exam   Constitutional:   General: Pt. is well-developed, well-nourished, appears stated age, in no acute distress, alert and oriented x 3. No evidence of depression, anxiety, or agitation. Calm, cooperative, and communicative. " Appropriate interactions and affect.       Cardiovascular:   Pulses:       Dorsalis pedis pulses are 2+ on the right side, and 2+ on the left side.        Posterior tibial pulses are 1+ on the right side, and 1+ on the left side.   There is decreased digital hair.   Musculoskeletal:        Right foot: There is normal range of motion.        Left foot:  There is normal range of motion.   Muscle strength is 5/5 in all groups bilaterally.    Decreased stride, station of gait.  apropulsive toe off.  Increased angle and base of gait.    Patient has hammertoes of digits 2-5 bilateral partially reducible without symptom today.    Visible and palpable bunion without pain at dorsomedial 1st metatarsal head right and left.  Hallux abducted right and left partially reducible, tracks laterally without being track bound.  No ecchymosis, erythema, edema, or cardinal signs infection or signs of trauma same foot.     Neurological: A sensory deficit is present.   Baxter-Keon 5.07 monofilamant testing is diminished Farhad feet. Sharp/dull sensation diminished Bilaterally   Skin: Skin is warm, dry. No abrasion, no ecchymosis, no rash noted. He is not diaphoretic. No cyanosis. No pallor. Nails show no clubbing.   Medial and lateral hallux nail margin of right foot with ingrown nail plate and thick HPK. Surrounding erythema and minimal edema is noted there is no granuloma formation noted. no malodor     Toenails 1-5 bilaterally are elongated by 2-3 mm, thickened by 2-3 mm, discolored/yellowed, dystrophic, brittle with subungual debris.    Focal hyperkeratotic lesion consisting entirely of hyperkeratotic tissue without open skin, drainage, pus, fluctuance, malodor, or signs of infection: medial IPJ of hallux farhad    Interdigital Spaces clean, dry and without evidence of break in skin integrity   Psychiatric: He has a normal mood and affect. His speech is normal.   Nursing note and vitals reviewed.        Assessment:       Encounter  Diagnoses   Name Primary?    Type II diabetes mellitus with neurological manifestations Yes    Onychomycosis due to dermatophyte     Corn or callus     Hammer toes of both feet     Hallux abducto valgus, left     Hallux abducto valgus, right          Plan:       Diagnoses and all orders for this visit:    Type II diabetes mellitus with neurological manifestations    Onychomycosis due to dermatophyte    Corn or callus    Hammer toes of both feet    Hallux abducto valgus, left    Hallux abducto valgus, right      I counseled the patient on his conditions, their implications and medical management.      - Shoe inspection. Diabetic Foot Education. Patient reminded of the importance of good nutrition and blood sugar control to help prevent podiatric complications of diabetes. Patient instructed on proper foot hygeine. We discussed wearing proper shoe gear, daily foot inspections, never walking without protective shoe gear, never putting sharp instruments to feet    -Significant increase noted in his last hemoglobin A1c in comparison to those previously obtained.  Discussed with patient how uncontrolled diabetes can affect the neurovascular status of his  foot as well as his ability to heal wounds and fight infection.    - With patient's permission, nails were aggressively reduced and debrided x 10 to their soft tissue attachment mechanically, removing all offending nail and debris. Patient relates relief following the procedure. Utilizing sterile toenail clippers I aggressively trimmed  the offending right hallux  nail border approximately 3 mm from its edge and carried the nail plate incision down at an angle in order to wedge out the offending cryptotic portion of the nail plate. The offending border was then removed in toto.     - After cleansing the  area w/ alcohol prep pad the above mentioned hyperkeratosis was trimmed utilizing No 15 scapel, to a smooth base with out incident. Patient tolerated this  well and  reported comfort to the area of medial hallux IPJ concha    - He will continue to monitor the areas daily, inspect his feet, wear protective shoe gear when ambulatory, moisturizer to maintain skin integrity and follow in this office in approximately 2-3 months, sooner PRN

## 2024-02-02 ENCOUNTER — OFFICE VISIT (OUTPATIENT)
Dept: PAIN MEDICINE | Facility: CLINIC | Age: 84
End: 2024-02-02
Payer: MEDICARE

## 2024-02-02 VITALS
HEART RATE: 73 BPM | RESPIRATION RATE: 16 BRPM | OXYGEN SATURATION: 93 % | DIASTOLIC BLOOD PRESSURE: 58 MMHG | SYSTOLIC BLOOD PRESSURE: 119 MMHG

## 2024-02-02 DIAGNOSIS — M47.816 LUMBAR SPONDYLOSIS: ICD-10-CM

## 2024-02-02 DIAGNOSIS — M54.16 LUMBAR RADICULOPATHY: ICD-10-CM

## 2024-02-02 DIAGNOSIS — M51.36 DDD (DEGENERATIVE DISC DISEASE), LUMBAR: ICD-10-CM

## 2024-02-02 DIAGNOSIS — M48.062 SPINAL STENOSIS OF LUMBAR REGION WITH NEUROGENIC CLAUDICATION: ICD-10-CM

## 2024-02-02 PROCEDURE — 99999 PR PBB SHADOW E&M-EST. PATIENT-LVL IV: CPT | Mod: PBBFAC,,, | Performed by: PAIN MEDICINE

## 2024-02-02 PROCEDURE — 99214 OFFICE O/P EST MOD 30 MIN: CPT | Mod: S$GLB,,, | Performed by: PAIN MEDICINE

## 2024-02-02 RX ORDER — HYDROCODONE BITARTRATE AND ACETAMINOPHEN 7.5; 325 MG/1; MG/1
1 TABLET ORAL EVERY 8 HOURS PRN
Qty: 90 TABLET | Refills: 0 | Status: SHIPPED | OUTPATIENT
Start: 2024-02-25 | End: 2024-02-14

## 2024-02-02 NOTE — PROGRESS NOTES
Subjective:     Patient ID: Percy Ramirez is a 83 y.o. male    Chief Complaint: Follow-up (3mo med mgmt)      Referred by: No ref. provider found      HPI:    Interval History (2/2/24):  He returns today for follow up.  He reports that he continues to have back pain as before.  Denies any changes in the quality or location of his pain.  Denies any new worsening symptoms.  Still has pain mainly when standing up and trying to do activities such as cooking.  Can only stand for a relatively short period time before needing to sit down.  Continues take Norco 5-325 mg q.8 hours p.r.n..  Has been prescribed 60 tablets per month as he reports taking roughly 2 pills per day.  However, states that medication seems to be a little less effective lately.  Has been taking up to 3 times a day as prescribed.  He reports that he gets good relief from each dose, but still has significant pain.  He thinks that increasing the dosage may provide additional benefit.  He denies any adverse effects of this medication.  He is reluctant to increase dosage too much as he does not like to feel sedated.      Interval History PA (11/03/2023):  Patient returns to clinic for follow up and medication refill.  Patient denies any changes in the quality or location of his pain since previous visit.  Patient does report some worsening of pain overall.  Notes specifically worsening of midline lower back pain with prolonged activity.  Notes that he continues to take Norco 5-325 mg q.8 hours p.r.n. #60.  Typically splitting the pills in half and taking every 8 hours.  However due to recently increased pain he has been taking a full pill to help manage his pain.  Reporting adequate relief.  Denies any adverse effects from this medication.    Interval History PA (08/18/2023):  Patient returns to clinic for follow up of and medication refill.  Patient does report since previous visit he has had a incident where he fell at his home.  Occurred approximately  2 weeks ago and has been dealing with some increased pain around his lower lumbar region as well as hip.  States overall pain has been improving since incident.  Significant bruising noted midline lower lumbar spine and around his hips and arms.  Patient has been evaluated by primary care regarding this incident, imaging without evidence of fracture.  He continues to take Norco 5-325 mg q.8 hours p.r.n. #60.  States since the incident he has been taking a full pill to help manage the pain.  Continues to take 2 total pills per day with adequate relief.  Previously was cutting the pills in half and taking every 8 hours.  Denies any adverse effects from this medication.  Notes that he is scheduled to start physical therapy to help with his mobility.    Interval History PA (06/15/2023):  Patient returns to clinic for follow up and medication refill.  She denies any changes in the quality or location his pain since previous visit.  Denies any new or worsening symptoms.  Continues to take Norco 5-325 mg q.8 hours p.r.n. #60. with adequate relief, denies any adverse effects from this medication.  Continues to use with the pills in half and will occasionally take a full pill for episodes of increased pain.  Approximately 2 pills per day.    Interval History PA (03/24/2023):  Patient returns to clinic for follow up of chronic lower back and extremity pain and medication refill.  Patient denies any changes in the quality or location of his pain since previous visit.  Denies any new or worsening symptoms.  Patient was recently started on [address] 5-325 Q 8 hours p.r.n..  He reports typically spitting the pills in half and occasionally taking a full pill with increased pain.  Notes taking approximately 2 pill total per day.  Notes adequate relief from this medication, denies any adverse effects.    Initial Encounter (2/24/23):  Percy Ramirez is a 83 y.o. male who presents today with chronic low back and lower extremity pain.   This pain has been present for years.  No specific inciting events or injuries noted.  Pain has been worsening over time.  Pain is constant but significantly worsened with standing or walking.  Can only stand or walk for short periods of time before needing to rest.  Pain is located in the midline lower lumbar spine and will radiate into the bilateral lumbar paraspinal regions and hips.  Denies any persistent numbness, tingling, weakness, bowel bladder dysfunction.  Does report that his legs feel weak after standing or walking for prolonged periods of time.  Pain improves upon sitting but still has pain when sitting.   This pain is described in detail below.    Physical Therapy:  No.    Non-pharmacologic Treatment:  Rest helps         TENS?  No    Pain Medications:         Currently taking:  Tylenol, gabapentin, Robaxin, Cymbalta, Norco 5-325 mg Q 8 hours prn    Has tried in the past:      Has not tried:  NSAIDs, TCAs, topical creams    Blood thinners:  Coumadin    Interventional Therapies:  None    Relevant Surgeries:  None    Affecting sleep?  Yes    Affecting daily activities? yes    Depressive symptoms? no          SI/HI? No    Work status: Retired    Pain Scores:    Best:       7/10  Worst:     10/10  Usually:   9/10  Today:    10/10    Pain Disability Index  Family/Home Responsibilities:: 9  Recreation:: 9  Social Activity:: 9  Self Care:: 9  Life-Support Activities:: 9    Review of Systems   Constitutional:  Negative for activity change, appetite change, chills, fatigue, fever and unexpected weight change.   HENT:  Negative for hearing loss.    Eyes:  Negative for visual disturbance.   Respiratory:  Negative for chest tightness and shortness of breath.    Cardiovascular:  Negative for chest pain.   Gastrointestinal:  Negative for abdominal pain, constipation, diarrhea, nausea and vomiting.   Genitourinary:  Negative for difficulty urinating.   Musculoskeletal:  Positive for arthralgias, back pain, gait  problem and myalgias. Negative for neck pain.   Skin:  Negative for rash.   Neurological:  Negative for dizziness, weakness, light-headedness, numbness and headaches.   Psychiatric/Behavioral:  Positive for sleep disturbance. Negative for hallucinations and suicidal ideas. The patient is not nervous/anxious.        Past Medical History:   Diagnosis Date    Allergy     Arthritis     CAD, multiple vessel     DR Melissa    Cataract     Depression     History of colon polyps     Hyperlipidemia     Hypertension     Macular degeneration     Dr Razo for injection, [facility]ia for eye    S/P CABG x 2     Type 2 diabetes mellitus with circulatory disorder     Dr. Aquino       Past Surgical History:   Procedure Laterality Date    broken elbow      left    COLONOSCOPY N/A 10/4/2017    Procedure: COLONOSCOPY;  Surgeon: Gabriel Leung MD;  Location: [facility];  Service: Endoscopy;  Laterality: N/A;    EYE SURGERY      cataract    heart bypass      2004    HERNIA REPAIR      right    ROTATOR CUFF REPAIR      right         Social History     Socioeconomic History    Marital status:     Number of children: 4    Years of education: GED   Occupational History    Occupation: retired [employer]    Tobacco Use    Smoking status: Former     Current packs/day: 0.00     Average packs/day: 3.0 packs/day for 45.0 years (135.0 ttl pk-yrs)     Types: Cigarettes     Start date: 1959     Quit date: 2004     Years since quittin.8    Smokeless tobacco: Former   Substance and Sexual Activity    Alcohol use: Yes     Comment: was heavy drinker 35 years ago, rare use now    Drug use: No    Sexual activity: Yes     Partners: Female       Review of patient's allergies indicates:   Allergen Reactions    Aricept odt [donepezil] Diarrhea and Nausea And Vomiting    Codeine Nausea Only     weak    Ranexa [ranolazine]        Current Outpatient Medications on File Prior to Visit   Medication Sig Dispense Refill    acetaminophen  "(TYLENOL) 325 MG tablet Take 2 tablets (650 mg total) by mouth every 6 (six) hours as needed. 13 tablet 0    albuterol (PROVENTIL/VENTOLIN HFA) 90 mcg/actuation inhaler INHALE 2 PUFFS BY MOUTH EVERY 6 HOURS AS NEEDED FOR WHEEZING OR  SHORTNESS  OF  BREATH 54 g 3    atorvastatin (LIPITOR) 40 MG tablet Take 40 mg by mouth once daily.      BD INSULIN PEN NEEDLE UF SHORT 31 gauge x 5/16" Ndle       BD INSULIN SYRINGE ULTRA-FINE 1/2 mL 31 gauge x 15/64" Syrg       blood sugar diagnostic Strp To check BG 3 times daily, to use with insurance preferred meter 200 strip 11    carvedilol (COREG) 25 MG tablet Take 1 tablet (25 mg total) by mouth 2 (two) times daily. 180 tablet 0    ceramides 1,3,6-II (CERAVE DAILY MOISTURIZING) Lotn Apply twice daily to skin 355 mL 1    cinnamon bark 500 mg capsule Take 1,000 mg by mouth once daily.        clopidogreL (PLAVIX) 75 mg tablet Take 75 mg by mouth.      COMIRNATY 2023-24, 12Y UP,,PF, 30 mcg/0.3 mL injection       diclofenac sodium (VOLTAREN) 1 % Gel Apply 2 g topically 2 (two) times daily. 100 g 0    diclofenac sodium (VOLTAREN) 1 % Gel Apply 2 g topically 3 (three) times daily. 50 g 0    DULoxetine (CYMBALTA) 60 MG capsule Take 1 capsule (60 mg total) by mouth once daily. 90 capsule 3    ENTRESTO  mg per tablet Take 1 tablet by mouth 2 (two) times daily.      FLUAD QUAD 2023-24,65Y UP,,PF, 60 mcg (15 mcg x 4)/0.5 mL Syrg       fluticasone propionate (FLONASE) 50 mcg/actuation nasal spray 1 spray (50 mcg total) by Each Nostril route 2 (two) times daily. 18.2 mL 3    furosemide (LASIX) 40 MG tablet Take 40 mg by mouth once daily.      gabapentin (NEURONTIN) 300 MG capsule Take 4 capsules (1,200 mg total) by mouth 2 (two) times daily. (Patient taking differently: Take 900 mg by mouth 2 (two) times daily.) 540 capsule 1    glimepiride (AMARYL) 4 MG tablet Take 4 mg by mouth daily with breakfast.       HYDROcodone-acetaminophen (NORCO) 5-325 mg per tablet Take 1 tablet by mouth " "every 8 (eight) hours as needed for Pain. 60 tablet 0    insulin NPH-insulin regular, 70/30, (NOVOLIN 70/30) 100 unit/mL (70-30) injection Inject into the skin. Inject 10 units at breakfast and inject 10 units at night      insulin syringe-needle U-100 0.3 mL 31 gauge x 15/64" Syrg USE TO INJECT INSULIN THREE TIMES DAILY      insulin syringe-needle U-100 1/2 mL 31 x 5/16" Syrg       ipratropium (ATROVENT) 42 mcg (0.06 %) nasal spray 2 sprays by Nasal route every 6 (six) hours as needed for Rhinitis (use as needed for runny nose and also use 15 minutes prior to meal). Clean nose with saline prior to use 15 mL 3    isosorbide mononitrate (IMDUR) 30 MG 24 hr tablet Take 30 mg by mouth once daily.      lancets Misc To check BG 3 times daily, to use with insurance preferred meter 200 each 11    LIDOcaine (LIDODERM) 5 % Place 1 patch onto the skin once daily. Remove & Discard patch within 12 hours or as directed by MD 5 patch 1    meclizine (ANTIVERT) 12.5 mg tablet Take 1 tablet (12.5 mg total) by mouth 3 (three) times daily as needed for Dizziness. 90 tablet 0    metFORMIN (GLUCOPHAGE) 500 MG tablet Take 500 mg by mouth 2 (two) times daily.      methocarbamoL (ROBAXIN) 500 MG Tab TAKE 1 TABLET BY MOUTH 4 TIMES DAILY FOR 10 DAYS 60 tablet 0    nitroGLYCERIN (NITROSTAT) 0.4 MG SL tablet DISSOLVE 1 TABLET UNDER THE TONGUE EVERY 5 MINUTES AS  NEEDED FOR CHEST PAIN. MAX  OF 3 TABLETS IN 15 MINUTES. CALL 911 IF PAIN PERSISTS.      pravastatin (PRAVACHOL) 20 MG tablet Take 1 tablet (20 mg total) by mouth once daily. 90 tablet 1    spironolactone (ALDACTONE) 25 MG tablet Take 25 mg by mouth.      tiotropium-olodateroL (STIOLTO RESPIMAT) 2.5-2.5 mcg/actuation Mist Inhale 2 puffs into the lungs once daily. Controller 1 g 11    triamcinolone acetonide 0.1% (KENALOG) 0.1 % cream SMARTSI Application Topical 2-3 Times Daily      valsartan (DIOVAN) 80 MG tablet Take 80 mg by mouth.      VIT C/VIT E AC/LUT/COPPER/ZINC (PRESERVISION " LUTEIN ORAL) Take by mouth.      warfarin (COUMADIN) 5 MG tablet Take 2 tabs by mouth on Tuesday,Thursday, Saturday and Sundays then 1.5 on all other days.      blood-glucose meter kit To check BG 3 times daily, to use with insurance preferred meter 1 each 0     No current facility-administered medications on file prior to visit.       Objective:      BP (!) 119/58 (BP Location: Left arm, Patient Position: Sitting, BP Method: Medium (Automatic))   Pulse 73   Resp 16   SpO2 (!) 93%     Exam:  GEN:  Well developed, well nourished.  No acute distress.   HEENT:  No trauma.  Mucous membranes moist.  On O2 via nasal cannula.  PSYCH: Normal affect. Thought content appropriate.  CHEST:  Breathing symmetric.  No audible wheezing.  ABD: Soft, non-distended.  SKIN:  Warm, pink, dry.  No rash on exposed areas.    EXT:  No cyanosis, clubbing, or edema.  No color change or changes in nail or hair growth.  NEURO/MUSCULOSKELETAL:  Fully alert, oriented, and appropriate. Speech normal keesha. No cranial nerve deficits.   Gait:  Antalgic.  Uses rolling walker for ambulation.     Imaging:  Narrative & Impression    EXAMINATION:  XR HIP WITH PELVIS WHEN PERFORMED, 2 OR 3  VIEWS RIGHT     CLINICAL HISTORY:  Unspecified fall, initial encounter     TECHNIQUE:  AP view of the pelvis and frog leg lateral view of the right hip were performed.     COMPARISON:  Non 09/21/2020 e     FINDINGS:  Mild DJD.  Left hip joint space is slightly narrowed.  No acute fracture or dislocation.  No bone destruction identified.  Vascular calcifications noted.     Impression:     See above        Electronically signed by: Trever Thompson MD  Date:                                            08/11/2023  Time:                                           12:23     Narrative & Impression    EXAMINATION:  XR LUMBAR SPINE AP AND LATERAL     CLINICAL HISTORY:  Unspecified fall, initial encounter     TECHNIQUE:  AP, lateral and spot images were performed of the lumbar  spine.     COMPARISON:  10/19/2022 CT lumbar spine none     FINDINGS:  Mild DJD.  There is a grade 1 L4/L5 anterolisthesis.  The L4/L5 disc space is slightly narrowed.  No fracture or dislocation.  No bone destruction identified     Impression:     See above        Electronically signed by: Trever Thompson MD  Date:                                            08/11/2023  Time:                                           12:21     Narrative & Impression    EXAMINATION:  CT LUMBAR SPINE WITHOUT CONTRAST     CLINICAL HISTORY:  Lumbar radiculopathy, no red flags, no prior management;  Radiculopathy, lumbar region     TECHNIQUE:  Low-dose axial, sagittal and coronal reformations are obtained through the lumbar spine.  Contrast was not administered.     COMPARISON:  02/25/2015     FINDINGS:  Alignment: Grade 1 anterolisthesis at L4-L5.     Vertebrae: No fracture. No lytic or blastic lesion.     Discs: Mild disc height loss at L3-L5.     Sacroiliac joints: Mild degenerative changes.     Degenerative findings:     T12-L1: No spinal canal stenosis or neural foraminal narrowing.     L1-L2: No spinal canal stenosis or neural foraminal narrowing.     L2-L3: Circumferential disc bulge and mild facet arthropathy resulting in mild bilateral neural foraminal narrowing.     L3-L4: Circumferential disc bulge and moderate facet arthropathy resulting in moderate to severe spinal canal stenosis and moderate bilateral neural foraminal narrowing.     L4-L5: Circumferential disc bulge and severe facet arthropathy resulting in severe spinal canal stenosis and moderate bilateral neural foraminal narrowing.     L5-S1: Circumferential disc bulge and severe facet arthropathy resulting in mild bilateral neural foraminal narrowing.     Paraspinal muscles & soft tissues: Mild paraspinal muscle atrophy.  Vascular calcifications with partially visualized ectasia of the abdominal aorta and iliac arteries.  Fibrotic changes at the lung bases.      Impression:     1. Multilevel degenerative changes of the lumbar spine detailed above.  Moderate to severe spinal canal stenosis at L4-S1.  Moderate neural foraminal narrowing at L3-L5.  2. 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        Electronically signed by: Yogesh Gamez MD  Date:                                            10/19/2022  Time:                                           09:56       Assessment:       1. DDD (degenerative disc disease), lumbar  HYDROcodone-acetaminophen (NORCO) 7.5-325 mg per tablet      2. Lumbar spondylosis  HYDROcodone-acetaminophen (NORCO) 7.5-325 mg per tablet      3. Lumbar radiculopathy  HYDROcodone-acetaminophen (NORCO) 7.5-325 mg per tablet      4. Spinal stenosis of lumbar region with neurogenic claudication  HYDROcodone-acetaminophen (NORCO) 7.5-325 mg per tablet            Plan:       Percy was seen today for follow-up.    Diagnoses and all orders for this visit:    DDD (degenerative disc disease), lumbar  -     HYDROcodone-acetaminophen (NORCO) 7.5-325 mg per tablet; Take 1 tablet by mouth every 8 (eight) hours as needed for Pain.    Lumbar spondylosis  -     HYDROcodone-acetaminophen (NORCO) 7.5-325 mg per tablet; Take 1 tablet by mouth every 8 (eight) hours as needed for Pain.    Lumbar radiculopathy  -     HYDROcodone-acetaminophen (NORCO) 7.5-325 mg per tablet; Take 1 tablet by mouth every 8 (eight) hours as needed for Pain.    Spinal stenosis of lumbar region with neurogenic claudication  -     HYDROcodone-acetaminophen (NORCO) 7.5-325 mg per tablet; Take 1 tablet by mouth every 8 (eight) hours as needed for Pain.      Percy Ramirez is a 83 y.o. male with chronic low back and lower extremity pain.  Subjectively symptoms most consistent with neurogenic claudication related to spinal stenosis.  Patient is noted to have multilevel moderate to severe spinal stenosis on lumbar CT scan.    Prior records reviewed.   Continue with  scheduled physical therapy.  Increase Norco to 7.5-325 mg q.8 hours p.r.n..  One month supply of 90 pills per month provided today.  Prescription fill date is 2/25/24, however since he has been taking medication 3 times a day he may run out a little earlier.  He can call if this is the case and fill date can be changed within reason.  Prescription monitoring program reviewed today.  No inconsistencies noted.   Drugs of abuse blood screen completed on 06/15/2023.  Consistent with prescribed medications.  Opioid risk tool completed.  Low risk.  Pain contract signed on 03/24/2023  Return to clinic in 1 month or sooner if needed.  At that time we will discuss efficacy of medications and make and necessary adjustments.         This note was created by combination of typed  and M-Modal dictation.  Transcription and phonetic errors may be present.  If there are any questions, please contact me.

## 2024-02-07 LAB
LEFT EYE DM RETINOPATHY: POSITIVE
RIGHT EYE DM RETINOPATHY: POSITIVE

## 2024-02-08 ENCOUNTER — PATIENT OUTREACH (OUTPATIENT)
Dept: ADMINISTRATIVE | Facility: HOSPITAL | Age: 84
End: 2024-02-08
Payer: MEDICARE

## 2024-02-12 ENCOUNTER — TELEPHONE (OUTPATIENT)
Dept: PSYCHIATRY | Facility: CLINIC | Age: 84
End: 2024-02-12
Payer: MEDICARE

## 2024-02-12 NOTE — TELEPHONE ENCOUNTER
----- Message from Evangelina Resendez sent at 2/12/2024  7:44 AM CST -----  .Type: Patient Call Back    Who called: Self     What is the request in detail: Calling to get appointment scheduled     Can the clinic reply by [facility]? No     Would the patient rather a call back or a response via [facility]? Call Back     Best call back number: .[phone] (home)       Additional Information:      Spoke to patient appointment booked

## 2024-02-14 DIAGNOSIS — M54.16 LUMBAR RADICULOPATHY: ICD-10-CM

## 2024-02-14 DIAGNOSIS — M51.36 DDD (DEGENERATIVE DISC DISEASE), LUMBAR: ICD-10-CM

## 2024-02-14 DIAGNOSIS — M47.816 LUMBAR SPONDYLOSIS: ICD-10-CM

## 2024-02-14 DIAGNOSIS — M48.062 SPINAL STENOSIS OF LUMBAR REGION WITH NEUROGENIC CLAUDICATION: ICD-10-CM

## 2024-02-14 RX ORDER — HYDROCODONE BITARTRATE AND ACETAMINOPHEN 7.5; 325 MG/1; MG/1
1 TABLET ORAL EVERY 8 HOURS PRN
Qty: 90 TABLET | Refills: 0 | Status: SHIPPED | OUTPATIENT
Start: 2024-03-15 | End: 2024-03-01 | Stop reason: SDUPTHER

## 2024-02-14 RX ORDER — HYDROCODONE BITARTRATE AND ACETAMINOPHEN 7.5; 325 MG/1; MG/1
1 TABLET ORAL EVERY 8 HOURS PRN
Qty: 90 TABLET | Refills: 0 | Status: SHIPPED | OUTPATIENT
Start: 2024-02-14 | End: 2024-03-15

## 2024-02-16 ENCOUNTER — TELEPHONE (OUTPATIENT)
Dept: NEUROLOGY | Facility: CLINIC | Age: 84
End: 2024-02-16
Payer: MEDICARE

## 2024-02-19 ENCOUNTER — OFFICE VISIT (OUTPATIENT)
Dept: NEUROLOGY | Facility: CLINIC | Age: 84
End: 2024-02-19
Payer: MEDICARE

## 2024-02-19 VITALS
SYSTOLIC BLOOD PRESSURE: 113 MMHG | WEIGHT: 202.38 LBS | HEART RATE: 68 BPM | BODY MASS INDEX: 30.67 KG/M2 | HEIGHT: 68 IN | DIASTOLIC BLOOD PRESSURE: 61 MMHG

## 2024-02-19 DIAGNOSIS — R42 DIZZINESS AND GIDDINESS: Primary | ICD-10-CM

## 2024-02-19 DIAGNOSIS — M54.16 LUMBAR RADICULOPATHY: ICD-10-CM

## 2024-02-19 PROCEDURE — 99999 PR PBB SHADOW E&M-EST. PATIENT-LVL IV: CPT | Mod: PBBFAC,,, | Performed by: STUDENT IN AN ORGANIZED HEALTH CARE EDUCATION/TRAINING PROGRAM

## 2024-02-19 PROCEDURE — 99214 OFFICE O/P EST MOD 30 MIN: CPT | Mod: S$GLB,,, | Performed by: STUDENT IN AN ORGANIZED HEALTH CARE EDUCATION/TRAINING PROGRAM

## 2024-02-19 NOTE — PROGRESS NOTES
"  Chief Complaint      Checkup visit    History of Present Illness     Percy Ramirez is a 83 y.o. male with a history of multiple medical diagnoses as listed below that presents for vertigo/dizziness, states has seen multiple specialists including his ophthalmologist and associated with macular degeneration. Describes it as when he looks at objects, he feels like a boat is rocking. Denies ear ringing, has been on meclizine to see if any relief.    Patient is on warfarin and plavix. Has CAD, s/p CABG. With bilateral caronary artery disease among his other medical problems.    Interim:  2/19/24 - Patient checking in, continues on warfarin and plavix. Doing well.   Checks warfarin continually. Has not fallen or hit his head.  Sees pain management for back pain. Trigger finger in 3rd digits bilaterally.  Doing well.  No recent illnesses.     Dr Edmondson gives him meclizine for intermittent dizziness. Has seen ENT. On chart review, has taken physical therapy for inner ear and vertigo. Valium worked better for him in the past.      Review of patient's allergies indicates:   Allergen Reactions    Aricept odt [donepezil] Diarrhea and Nausea And Vomiting    Codeine Nausea Only     weak    Ranexa [ranolazine]      Current Outpatient Medications   Medication Sig Dispense Refill    acetaminophen (TYLENOL) 325 MG tablet Take 2 tablets (650 mg total) by mouth every 6 (six) hours as needed. 13 tablet 0    albuterol (PROVENTIL/VENTOLIN HFA) 90 mcg/actuation inhaler INHALE 2 PUFFS BY MOUTH EVERY 6 HOURS AS NEEDED FOR WHEEZING OR  SHORTNESS  OF  BREATH 54 g 3    atorvastatin (LIPITOR) 40 MG tablet Take 40 mg by mouth once daily.      BD INSULIN PEN NEEDLE UF SHORT 31 gauge x 5/16" Ndle       BD INSULIN SYRINGE ULTRA-FINE 1/2 mL 31 gauge x 15/64" Syrg       blood sugar diagnostic Strp To check BG 3 times daily, to use with insurance preferred meter 200 strip 11    carvedilol (COREG) 25 MG tablet Take 1 tablet (25 mg total) by mouth 2 " "(two) times daily. 180 tablet 0    ceramides 1,3,6-II (CERAVE DAILY MOISTURIZING) Lotn Apply twice daily to skin 355 mL 1    cinnamon bark 500 mg capsule Take 1,000 mg by mouth once daily.        clopidogreL (PLAVIX) 75 mg tablet Take 75 mg by mouth.      COMIRNATY 2023-24, 12Y UP,,PF, 30 mcg/0.3 mL injection       diclofenac sodium (VOLTAREN) 1 % Gel Apply 2 g topically 2 (two) times daily. 100 g 0    diclofenac sodium (VOLTAREN) 1 % Gel Apply 2 g topically 3 (three) times daily. 50 g 0    DULoxetine (CYMBALTA) 60 MG capsule Take 1 capsule (60 mg total) by mouth once daily. 90 capsule 3    ENTRESTO  mg per tablet Take 1 tablet by mouth 2 (two) times daily.      FLUAD QUAD 2023-24,65Y UP,,PF, 60 mcg (15 mcg x 4)/0.5 mL Syrg       fluticasone propionate (FLONASE) 50 mcg/actuation nasal spray 1 spray (50 mcg total) by Each Nostril route 2 (two) times daily. 18.2 mL 3    furosemide (LASIX) 40 MG tablet Take 40 mg by mouth once daily.      gabapentin (NEURONTIN) 300 MG capsule Take 4 capsules (1,200 mg total) by mouth 2 (two) times daily. (Patient taking differently: Take 900 mg by mouth 2 (two) times daily.) 540 capsule 1    glimepiride (AMARYL) 4 MG tablet Take 4 mg by mouth daily with breakfast.       HYDROcodone-acetaminophen (NORCO) 5-325 mg per tablet Take 1 tablet by mouth every 8 (eight) hours as needed for Pain. 60 tablet 0    HYDROcodone-acetaminophen (NORCO) 7.5-325 mg per tablet Take 1 tablet by mouth every 8 (eight) hours as needed for Pain. 90 tablet 0    [START ON 3/15/2024] HYDROcodone-acetaminophen (NORCO) 7.5-325 mg per tablet Take 1 tablet by mouth every 8 (eight) hours as needed for Pain. 90 tablet 0    insulin NPH-insulin regular, 70/30, (NOVOLIN 70/30) 100 unit/mL (70-30) injection Inject into the skin. Inject 10 units at breakfast and inject 10 units at night      insulin syringe-needle U-100 0.3 mL 31 gauge x 15/64" Syrg USE TO INJECT INSULIN THREE TIMES DAILY      insulin syringe-needle " "U-100 1/2 mL 31 x 5/16" Syrg       ipratropium (ATROVENT) 42 mcg (0.06 %) nasal spray 2 sprays by Nasal route every 6 (six) hours as needed for Rhinitis (use as needed for runny nose and also use 15 minutes prior to meal). Clean nose with saline prior to use 15 mL 3    isosorbide mononitrate (IMDUR) 30 MG 24 hr tablet Take 30 mg by mouth once daily.      lancets Misc To check BG 3 times daily, to use with insurance preferred meter 200 each 11    LIDOcaine (LIDODERM) 5 % Place 1 patch onto the skin once daily. Remove & Discard patch within 12 hours or as directed by MD Townsend patch 1    meclizine (ANTIVERT) 12.5 mg tablet Take 1 tablet (12.5 mg total) by mouth 3 (three) times daily as needed for Dizziness. 90 tablet 0    metFORMIN (GLUCOPHAGE) 500 MG tablet Take 500 mg by mouth 2 (two) times daily.      methocarbamoL (ROBAXIN) 500 MG Tab TAKE 1 TABLET BY MOUTH 4 TIMES DAILY FOR 10 DAYS 60 tablet 0    nitroGLYCERIN (NITROSTAT) 0.4 MG SL tablet DISSOLVE 1 TABLET UNDER THE TONGUE EVERY 5 MINUTES AS  NEEDED FOR CHEST PAIN. MAX  OF 3 TABLETS IN 15 MINUTES. CALL 911 IF PAIN PERSISTS.      pravastatin (PRAVACHOL) 20 MG tablet Take 1 tablet (20 mg total) by mouth once daily. 90 tablet 1    spironolactone (ALDACTONE) 25 MG tablet Take 25 mg by mouth.      tiotropium-olodateroL (STIOLTO RESPIMAT) 2.5-2.5 mcg/actuation Mist Inhale 2 puffs into the lungs once daily. Controller 1 g 11    triamcinolone acetonide 0.1% (KENALOG) 0.1 % cream SMARTSI Application Topical 2-3 Times Daily      valsartan (DIOVAN) 80 MG tablet Take 80 mg by mouth.      VIT C/VIT E AC/LUT/COPPER/ZINC (PRESERVISION LUTEIN ORAL) Take by mouth.      warfarin (COUMADIN) 5 MG tablet Take 2 tabs by mouth on Tuesday,Thursday, Saturday and Sundays then 1.5 on all other days.      blood-glucose meter kit To check BG 3 times daily, to use with insurance preferred meter 1 each 0     No current facility-administered medications for this visit.       Medical History "     Past Medical History:   Diagnosis Date    Allergy     Arthritis     CAD, multiple vessel     DR Melissa    Cataract     Depression     History of colon polyps     Hyperlipidemia     Hypertension     Macular degeneration     Dr Razo for injection, Jennifer for eye    S/P CABG x 2     Type 2 diabetes mellitus with circulatory disorder     Dr. Aquino     Past Surgical History:   Procedure Laterality Date    broken elbow      left    COLONOSCOPY N/A 10/4/2017    Procedure: COLONOSCOPY;  Surgeon: Gabriel Leung MD;  Location: [facility];  Service: Endoscopy;  Laterality: N/A;    EYE SURGERY      cataract    heart bypass      2004    HERNIA REPAIR      right    ROTATOR CUFF REPAIR      right  2004     Family History   Problem Relation Age of Onset    Arthritis Mother     Diabetes Mother     Stroke Mother     Heart attack Father     Cancer Brother     Throat cancer Brother     Diabetes Maternal Aunt     Diabetes Maternal Uncle     Heart disease Maternal Uncle     Diabetes Paternal Aunt     Heart disease Paternal Aunt     Heart disease Paternal Uncle     Heart disease Maternal Grandmother     Cancer Maternal Grandfather      Social History     Socioeconomic History    Marital status:     Number of children: 4    Years of education: GED   Occupational History    Occupation: retired tug    Tobacco Use    Smoking status: Former     Current packs/day: 0.00     Average packs/day: 3.0 packs/day for 45.0 years (135.0 ttl pk-yrs)     Types: Cigarettes     Start date: 1959     Quit date: 2004     Years since quittin.8    Smokeless tobacco: Former   Substance and Sexual Activity    Alcohol use: Yes     Comment: was heavy drinker 35 years ago, rare use now    Drug use: No    Sexual activity: Yes     Partners: Female       Exam     Vitals:    24 1001   BP: 113/61   Pulse: 68     Physical Exam:  General: Not in acute distress. Not ill-appearing.   HENT: Normocephalic and atraumatic. Moist  mucous membranes.  Eyes: Conjunctivae normal.   Pulmonary: Pulmonary effort is normal.   Abdominal: Abdomen is soft and flat.   Skin: Skin is warm and dry. No rashes.   Psychiatric: Mood normal.        Neurologic Exam   Mental status: oriented to person, place, and time  Attention: Normal. Concentration: normal.  Speech: speech is normal.  Cranial Nerves: PERRL, EOMI intact, V1-V3 Facial sensation intact. Symmetric facies. Hearing grossly intact. Palate and uvula midline, symmetric. No tongue deviation. Trapezius strength intact.     Motor exam: bulk and tone normal. Strength 5/5 grossly of upper and lower extremities    Reflexes: 2+ in bilateral upper extremities: biceps and brachiaradialis, 2+ in bilateral lower extremities: patellar     Sensory exam: light touch intact    Gait exam: uses a rolling walker    Labs and Imaging     Labs: reviewed  A1C 10.7, THS wnl, LDL 56    Imaging: personally reviewed  10/2022 CT lumbar spine - multilevel disc degenerative changes, moderate to severe canal stenosis L4-S1, moderate neural foraminal narrowing L3-L5    7/2022- CTH with chronic microvascular changes    Other procedures: reviewed  7/2022 - US carotid bilateral - 50-69% stenosis on L    Assessment and Plan     Problem List Items Addressed This Visit          Neuro    Lumbar radiculopathy       Other    Dizziness and giddiness - Primary    Overview     Discussed use of meclizine is his primary medication to address vertigo.  Valium was prescribed in the past which worked better per ENT prior notes. Discussed that the medication can and will cause increased balance dysfunction and possible weakness given is nature.  Patient advised that if he uses the medication he should not he is about more than necessary in order to prevent or reduce his risk of falls.  Patient voiced understanding. Does have continued tinnitus in which he has seen ENT for in past for his vertigo. PCP giving meclizine.           Patient doing well.  "Here to check in. Has multiple vascular risk factors including HTN, DMII, HLD. Asthma.     Was on meclizine prior for dizziness - pcp refilled.    Worsening diabetes - discussed control w PCP. Also seeing endocrine. Taken off metformin, on insulin. He loves eating cookies.     Patient w hx significant for CAD and CABG x 2, carotid artery stenosis. Also afib. Heart failure. Has macular degeneration in which he feels like "boat is rocking."  Sees optho.     US carotid given one in 7/2022 with L 50-69% stenosis - if worsens could potentially warrant intervention, patient on warfarin and plavix and follows w cardiology. Repeat ultrasound 11/2023 with similar findings: 0-19% stenosis of the R internal carotid stenosis, 50-59% L internal carotid. > 50% L ECA stenosis.   Discussed fall risk precautions.     Chronic back pain - seeing Dr Baker pain management    Questions answered, plans discussed. Multifactorial nature of his symptoms.     Fall risk precautions.     Follow-up: 3-4 months, patient wants to check in. Questions answered.     Time spent on this encounter: 30 minutes. This includes face to face time and non-face to face time preparing to see the patient (eg, review of tests), obtaining and/or reviewing separately obtained history, documenting clinical information in the electronic or other health record, independently interpreting results and communicating results to the patient/family/caregiver, or care coordinator.     "

## 2024-03-01 ENCOUNTER — OFFICE VISIT (OUTPATIENT)
Dept: PAIN MEDICINE | Facility: CLINIC | Age: 84
End: 2024-03-01
Payer: MEDICARE

## 2024-03-01 ENCOUNTER — TELEPHONE (OUTPATIENT)
Dept: FAMILY MEDICINE | Facility: CLINIC | Age: 84
End: 2024-03-01
Payer: MEDICARE

## 2024-03-01 VITALS
HEART RATE: 80 BPM | WEIGHT: 199.31 LBS | SYSTOLIC BLOOD PRESSURE: 110 MMHG | HEIGHT: 68 IN | OXYGEN SATURATION: 90 % | RESPIRATION RATE: 18 BRPM | DIASTOLIC BLOOD PRESSURE: 58 MMHG | BODY MASS INDEX: 30.21 KG/M2

## 2024-03-01 DIAGNOSIS — M47.816 LUMBAR SPONDYLOSIS: ICD-10-CM

## 2024-03-01 DIAGNOSIS — M48.062 SPINAL STENOSIS OF LUMBAR REGION WITH NEUROGENIC CLAUDICATION: ICD-10-CM

## 2024-03-01 DIAGNOSIS — M51.36 DDD (DEGENERATIVE DISC DISEASE), LUMBAR: Primary | ICD-10-CM

## 2024-03-01 DIAGNOSIS — M51.36 DDD (DEGENERATIVE DISC DISEASE), LUMBAR: ICD-10-CM

## 2024-03-01 DIAGNOSIS — M54.16 LUMBAR RADICULOPATHY: ICD-10-CM

## 2024-03-01 PROCEDURE — 99999 PR PBB SHADOW E&M-EST. PATIENT-LVL V: CPT | Mod: PBBFAC,,,

## 2024-03-01 PROCEDURE — 99214 OFFICE O/P EST MOD 30 MIN: CPT | Mod: S$GLB,,,

## 2024-03-01 NOTE — PROGRESS NOTES
Subjective:     Patient ID: Percy Ramirez is a 83 y.o. male    Chief Complaint: Medication Management      Referred by: No ref. provider found      HPI:    Interval History PA (03/01/2024):  Patient returns to clinic for follow up and medication refill.  Patient denies any changes in the quality or location of his pain since previous visit.  He denies any new or worsening symptoms.  Recently patient increase to Norco 7.5-325 mg q.8 hours p.r.n. with adequate relief, denies any adverse effects from this medication.  Notes that the increased dosage has been more beneficial in helping control his pain levels.    Interval History (2/2/24):  He returns today for follow up.  He reports that he continues to have back pain as before.  Denies any changes in the quality or location of his pain.  Denies any new worsening symptoms.  Still has pain mainly when standing up and trying to do activities such as cooking.  Can only stand for a relatively short period time before needing to sit down.  Continues take Norco 5-325 mg q.8 hours p.r.n..  Has been prescribed 60 tablets per month as he reports taking roughly 2 pills per day.  However, states that medication seems to be a little less effective lately.  Has been taking up to 3 times a day as prescribed.  He reports that he gets good relief from each dose, but still has significant pain.  He thinks that increasing the dosage may provide additional benefit.  He denies any adverse effects of this medication.  He is reluctant to increase dosage too much as he does not like to feel sedated.      Interval History PA (11/03/2023):  Patient returns to clinic for follow up and medication refill.  Patient denies any changes in the quality or location of his pain since previous visit.  Patient does report some worsening of pain overall.  Notes specifically worsening of midline lower back pain with prolonged activity.  Notes that he continues to take Norco 5-325 mg q.8 hours p.r.n. #60.   Typically splitting the pills in half and taking every 8 hours.  However due to recently increased pain he has been taking a full pill to help manage his pain.  Reporting adequate relief.  Denies any adverse effects from this medication.    Interval History PA (08/18/2023):  Patient returns to clinic for follow up of and medication refill.  Patient does report since previous visit he has had a incident where he fell at his home.  Occurred approximately 2 weeks ago and has been dealing with some increased pain around his lower lumbar region as well as hip.  States overall pain has been improving since incident.  Significant bruising noted midline lower lumbar spine and around his hips and arms.  Patient has been evaluated by primary care regarding this incident, imaging without evidence of fracture.  He continues to take Norco 5-325 mg q.8 hours p.r.n. #60.  States since the incident he has been taking a full pill to help manage the pain.  Continues to take 2 total pills per day with adequate relief.  Previously was cutting the pills in half and taking every 8 hours.  Denies any adverse effects from this medication.  Notes that he is scheduled to start physical therapy to help with his mobility.    Interval History PA (06/15/2023):  Patient returns to clinic for follow up and medication refill.  She denies any changes in the quality or location his pain since previous visit.  Denies any new or worsening symptoms.  Continues to take Norco 5-325 mg q.8 hours p.r.n. #60. with adequate relief, denies any adverse effects from this medication.  Continues to use with the pills in half and will occasionally take a full pill for episodes of increased pain.  Approximately 2 pills per day.    Interval History PA (03/24/2023):  Patient returns to clinic for follow up of chronic lower back and extremity pain and medication refill.  Patient denies any changes in the quality or location of his pain since previous visit.  Denies any  new or worsening symptoms.  Patient was recently started on [place] 5-325 Q 8 hours p.r.n..  He reports typically spitting the pills in half and occasionally taking a full pill with increased pain.  Notes taking approximately 2 pill total per day.  Notes adequate relief from this medication, denies any adverse effects.    Initial Encounter (2/24/23):  Percy Ramirez is a 83 y.o. male who presents today with chronic low back and lower extremity pain.  This pain has been present for years.  No specific inciting events or injuries noted.  Pain has been worsening over time.  Pain is constant but significantly worsened with standing or walking.  Can only stand or walk for short periods of time before needing to rest.  Pain is located in the midline lower lumbar spine and will radiate into the bilateral lumbar paraspinal regions and hips.  Denies any persistent numbness, tingling, weakness, bowel bladder dysfunction.  Does report that his legs feel weak after standing or walking for prolonged periods of time.  Pain improves upon sitting but still has pain when sitting.   This pain is described in detail below.    Physical Therapy:  No.    Non-pharmacologic Treatment:  Rest helps         TENS?  No    Pain Medications:         Currently taking:  Tylenol, gabapentin, Robaxin, Cymbalta, Norco 7.5-325 mg Q 8 hours prn    Has tried in the past:      Has not tried:  NSAIDs, TCAs, topical creams    Blood thinners:  Coumadin    Interventional Therapies:  None    Relevant Surgeries:  None    Affecting sleep?  Yes    Affecting daily activities? yes    Depressive symptoms? no          SI/HI? No    Work status: Retired    Pain Scores:    Best:       7/10  Worst:     10/10  Usually:   9/10  Today:    10/10         Review of Systems   Constitutional:  Negative for activity change, appetite change, chills, fatigue, fever and unexpected weight change.   HENT:  Negative for hearing loss.    Eyes:  Negative for visual disturbance.    Respiratory:  Negative for chest tightness and shortness of breath.    Cardiovascular:  Negative for chest pain.   Gastrointestinal:  Negative for abdominal pain, constipation, diarrhea, nausea and vomiting.   Genitourinary:  Negative for difficulty urinating.   Musculoskeletal:  Positive for arthralgias, back pain, gait problem and myalgias. Negative for neck pain.   Skin:  Negative for rash.   Neurological:  Negative for dizziness, weakness, light-headedness, numbness and headaches.   Psychiatric/Behavioral:  Positive for sleep disturbance. Negative for hallucinations and suicidal ideas. The patient is not nervous/anxious.        Past Medical History:   Diagnosis Date    Allergy     Arthritis     CAD, multiple vessel     DR Melissa    Cataract     Depression     History of colon polyps     Hyperlipidemia     Hypertension     Macular degeneration     Dr Razo for injection, Jennifer for eye    S/P CABG x 2     Type 2 diabetes mellitus with circulatory disorder     Dr. Aquino       Past Surgical History:   Procedure Laterality Date    broken elbow      left    COLONOSCOPY N/A 10/4/2017    Procedure: COLONOSCOPY;  Surgeon: Gabriel Leung MD;  Location: [facility];  Service: Endoscopy;  Laterality: N/A;    EYE SURGERY      cataract    heart bypass      2004    HERNIA REPAIR      right    ROTATOR CUFF REPAIR      right         Social History     Socioeconomic History    Marital status:     Number of children: 4    Years of education: GED   Occupational History    Occupation: retired tug    Tobacco Use    Smoking status: Former     Current packs/day: 0.00     Average packs/day: 3.0 packs/day for 45.0 years (135.0 ttl pk-yrs)     Types: Cigarettes     Start date: 1959     Quit date: 2004     Years since quittin.8    Smokeless tobacco: Former   Substance and Sexual Activity    Alcohol use: Yes     Comment: was heavy drinker 35 years ago, rare use now    Drug use: No    Sexual  "activity: Yes     Partners: Female       Review of patient's allergies indicates:   Allergen Reactions    Aricept odt [donepezil] Diarrhea and Nausea And Vomiting    Codeine Nausea Only     weak    Ranexa [ranolazine]        Current Outpatient Medications on File Prior to Visit   Medication Sig Dispense Refill    acetaminophen (TYLENOL) 325 MG tablet Take 2 tablets (650 mg total) by mouth every 6 (six) hours as needed. 13 tablet 0    albuterol (PROVENTIL/VENTOLIN HFA) 90 mcg/actuation inhaler INHALE 2 PUFFS BY MOUTH EVERY 6 HOURS AS NEEDED FOR WHEEZING OR  SHORTNESS  OF  BREATH 54 g 3    atorvastatin (LIPITOR) 40 MG tablet Take 40 mg by mouth once daily.      BD INSULIN PEN NEEDLE UF SHORT 31 gauge x 5/16" Ndle       BD INSULIN SYRINGE ULTRA-FINE 1/2 mL 31 gauge x 15/64" Syrg       blood sugar diagnostic Strp To check BG 3 times daily, to use with insurance preferred meter 200 strip 11    carvedilol (COREG) 25 MG tablet Take 1 tablet (25 mg total) by mouth 2 (two) times daily. 180 tablet 0    ceramides 1,3,6-II (CERAVE DAILY MOISTURIZING) Lotn Apply twice daily to skin 355 mL 1    cinnamon bark 500 mg capsule Take 1,000 mg by mouth once daily.        clopidogreL (PLAVIX) 75 mg tablet Take 75 mg by mouth.      COMIRNATY 2023-24, 12Y UP,,PF, 30 mcg/0.3 mL injection       diclofenac sodium (VOLTAREN) 1 % Gel Apply 2 g topically 2 (two) times daily. 100 g 0    diclofenac sodium (VOLTAREN) 1 % Gel Apply 2 g topically 3 (three) times daily. 50 g 0    DULoxetine (CYMBALTA) 60 MG capsule Take 1 capsule (60 mg total) by mouth once daily. 90 capsule 3    ENTRESTO  mg per tablet Take 1 tablet by mouth 2 (two) times daily.      FLUAD QUAD 2023-24,65Y UP,,PF, 60 mcg (15 mcg x 4)/0.5 mL Syrg       fluticasone propionate (FLONASE) 50 mcg/actuation nasal spray 1 spray (50 mcg total) by Each Nostril route 2 (two) times daily. 18.2 mL 3    furosemide (LASIX) 40 MG tablet Take 40 mg by mouth once daily.      gabapentin " "(NEURONTIN) 300 MG capsule Take 4 capsules (1,200 mg total) by mouth 2 (two) times daily. (Patient taking differently: Take 900 mg by mouth 2 (two) times daily.) 540 capsule 1    glimepiride (AMARYL) 4 MG tablet Take 4 mg by mouth daily with breakfast.       HYDROcodone-acetaminophen (NORCO) 7.5-325 mg per tablet Take 1 tablet by mouth every 8 (eight) hours as needed for Pain. 90 tablet 0    [START ON 3/15/2024] HYDROcodone-acetaminophen (NORCO) 7.5-325 mg per tablet Take 1 tablet by mouth every 8 (eight) hours as needed for Pain. 90 tablet 0    insulin NPH-insulin regular, 70/30, (NOVOLIN 70/30) 100 unit/mL (70-30) injection Inject into the skin. Inject 10 units at breakfast and inject 10 units at night      insulin syringe-needle U-100 0.3 mL 31 gauge x 15/64" Syrg USE TO INJECT INSULIN THREE TIMES DAILY      insulin syringe-needle U-100 1/2 mL 31 x 5/16" Syrg       ipratropium (ATROVENT) 42 mcg (0.06 %) nasal spray 2 sprays by Nasal route every 6 (six) hours as needed for Rhinitis (use as needed for runny nose and also use 15 minutes prior to meal). Clean nose with saline prior to use 15 mL 3    isosorbide mononitrate (IMDUR) 30 MG 24 hr tablet Take 30 mg by mouth once daily.      lancets Misc To check BG 3 times daily, to use with insurance preferred meter 200 each 11    LIDOcaine (LIDODERM) 5 % Place 1 patch onto the skin once daily. Remove & Discard patch within 12 hours or as directed by MD 5 patch 1    meclizine (ANTIVERT) 12.5 mg tablet Take 1 tablet (12.5 mg total) by mouth 3 (three) times daily as needed for Dizziness. 90 tablet 0    metFORMIN (GLUCOPHAGE) 500 MG tablet Take 500 mg by mouth 2 (two) times daily.      methocarbamoL (ROBAXIN) 500 MG Tab TAKE 1 TABLET BY MOUTH 4 TIMES DAILY FOR 10 DAYS 60 tablet 0    nitroGLYCERIN (NITROSTAT) 0.4 MG SL tablet DISSOLVE 1 TABLET UNDER THE TONGUE EVERY 5 MINUTES AS  NEEDED FOR CHEST PAIN. MAX  OF 3 TABLETS IN 15 MINUTES. CALL 911 IF PAIN PERSISTS.      " "pravastatin (PRAVACHOL) 20 MG tablet Take 1 tablet (20 mg total) by mouth once daily. 90 tablet 1    spironolactone (ALDACTONE) 25 MG tablet Take 25 mg by mouth.      tiotropium-olodateroL (STIOLTO RESPIMAT) 2.5-2.5 mcg/actuation Mist Inhale 2 puffs into the lungs once daily. Controller 1 g 11    triamcinolone acetonide 0.1% (KENALOG) 0.1 % cream SMARTSI Application Topical 2-3 Times Daily      valsartan (DIOVAN) 80 MG tablet Take 80 mg by mouth.      VIT C/VIT E AC/LUT/COPPER/ZINC (PRESERVISION LUTEIN ORAL) Take by mouth.      warfarin (COUMADIN) 5 MG tablet Take 2 tabs by mouth on Tuesday,Thursday, Saturday and Sundays then 1.5 on all other days.      blood-glucose meter kit To check BG 3 times daily, to use with insurance preferred meter 1 each 0     No current facility-administered medications on file prior to visit.       Objective:      BP (!) 110/58 (BP Location: Left arm, Patient Position: Sitting, BP Method: Medium (Automatic))   Pulse 80   Resp 18   Ht 5' 8" (1.727 m)   Wt 90.4 kg (199 lb 4.7 oz)   SpO2 (!) 90%   BMI 30.30 kg/m²     Exam:  GEN:  Well developed, well nourished.  No acute distress.   HEENT:  No trauma.  Mucous membranes moist.  On O2 via nasal cannula.  PSYCH: Normal affect. Thought content appropriate.  CHEST:  Breathing symmetric.  No audible wheezing.  ABD: Soft, non-distended.  SKIN:  Warm, pink, dry.  No rash on exposed areas.    EXT:  No cyanosis, clubbing, or edema.  No color change or changes in nail or hair growth.  NEURO/MUSCULOSKELETAL:  Fully alert, oriented, and appropriate. Speech normal keesha. No cranial nerve deficits.   Gait:  Antalgic.  Uses rolling walker for ambulation.     Imaging:  Narrative & Impression    EXAMINATION:  XR HIP WITH PELVIS WHEN PERFORMED, 2 OR 3  VIEWS RIGHT     CLINICAL HISTORY:  Unspecified fall, initial encounter     TECHNIQUE:  AP view of the pelvis and frog leg lateral view of the right hip were performed.     COMPARISON:  Non 2020 " e     FINDINGS:  Mild DJD.  Left hip joint space is slightly narrowed.  No acute fracture or dislocation.  No bone destruction identified.  Vascular calcifications noted.     Impression:     See above        Electronically signed by: Trever Thompson MD  Date:                                            08/11/2023  Time:                                           12:23     Narrative & Impression    EXAMINATION:  XR LUMBAR SPINE AP AND LATERAL     CLINICAL HISTORY:  Unspecified fall, initial encounter     TECHNIQUE:  AP, lateral and spot images were performed of the lumbar spine.     COMPARISON:  10/19/2022 CT lumbar spine none     FINDINGS:  Mild DJD.  There is a grade 1 L4/L5 anterolisthesis.  The L4/L5 disc space is slightly narrowed.  No fracture or dislocation.  No bone destruction identified     Impression:     See above        Electronically signed by: Trever Thompson MD  Date:                                            08/11/2023  Time:                                           12:21     Narrative & Impression    EXAMINATION:  CT LUMBAR SPINE WITHOUT CONTRAST     CLINICAL HISTORY:  Lumbar radiculopathy, no red flags, no prior management;  Radiculopathy, lumbar region     TECHNIQUE:  Low-dose axial, sagittal and coronal reformations are obtained through the lumbar spine.  Contrast was not administered.     COMPARISON:  02/25/2015     FINDINGS:  Alignment: Grade 1 anterolisthesis at L4-L5.     Vertebrae: No fracture. No lytic or blastic lesion.     Discs: Mild disc height loss at L3-L5.     Sacroiliac joints: Mild degenerative changes.     Degenerative findings:     T12-L1: No spinal canal stenosis or neural foraminal narrowing.     L1-L2: No spinal canal stenosis or neural foraminal narrowing.     L2-L3: Circumferential disc bulge and mild facet arthropathy resulting in mild bilateral neural foraminal narrowing.     L3-L4: Circumferential disc bulge and moderate facet arthropathy resulting in moderate to severe spinal  canal stenosis and moderate bilateral neural foraminal narrowing.     L4-L5: Circumferential disc bulge and severe facet arthropathy resulting in severe spinal canal stenosis and moderate bilateral neural foraminal narrowing.     L5-S1: Circumferential disc bulge and severe facet arthropathy resulting in mild bilateral neural foraminal narrowing.     Paraspinal muscles & soft tissues: Mild paraspinal muscle atrophy.  Vascular calcifications with partially visualized ectasia of the abdominal aorta and iliac arteries.  Fibrotic changes at the lung bases.     Impression:     1. Multilevel degenerative changes of the lumbar spine detailed above.  Moderate to severe spinal canal stenosis at L4-S1.  Moderate neural foraminal narrowing at L3-L5.  2. 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        Electronically signed by: Yogesh Gamez MD  Date:                                            10/19/2022  Time:                                           09:56       Assessment:       No diagnosis found.        Plan:       There are no diagnoses linked to this encounter.    Percy Ramirez is a 83 y.o. male with chronic low back and lower extremity pain.  Subjectively symptoms most consistent with neurogenic claudication related to spinal stenosis.  Patient is noted to have multilevel moderate to severe spinal stenosis on lumbar CT scan.    Prior records reviewed.   Continue Norco 7.5-325 mg q.8 hours p.r.n..  Three, one month supplies of 90 pills per month provided today.   Prescription monitoring program reviewed today.  No inconsistencies noted.   Drugs or abuse blood screen completed on 06/15/2023.  Consistent with prescribed medications.  Opioid risk tool completed.  Low risk.  Pain contract signed on 03/24/2023.  Dr. Santos is the provider of medication management and agrees with the plan of care.   Return to clinic in 3 months or sooner if needed.  At that time we will discuss efficacy of  medications and make any necessary adjustments.     Fernando Downs PA-C  [facility]  Interventional Pain Management   03/01/2024    This note was created by combination of typed  and M-Modal dictation.  Transcription and phonetic errors may be present.  If there are any questions, please contact me.

## 2024-03-01 NOTE — TELEPHONE ENCOUNTER
----- Message from Donna Gillis sent at 3/1/2024  4:22 PM CST -----  Regarding: Refill Request  Type: RX Refill Request      Who Called: Self       Refill or New Rx:   Refill     RX Name and Strength: albuterol (PROVENTIL/VENTOLIN HFA) 90 mcg/actuation inhaler        Preferred Pharmacy with phone number:  [id number]  [place], [place]  [address] [place] [address]          Would the patient rather a call back or a response via [facility]? Call       Best Call Back Number: .[phone]

## 2024-03-04 RX ORDER — HYDROCODONE BITARTRATE AND ACETAMINOPHEN 7.5; 325 MG/1; MG/1
1 TABLET ORAL EVERY 8 HOURS PRN
Qty: 90 TABLET | Refills: 0 | Status: SHIPPED | OUTPATIENT
Start: 2024-03-18 | End: 2024-04-17

## 2024-03-04 RX ORDER — HYDROCODONE BITARTRATE AND ACETAMINOPHEN 7.5; 325 MG/1; MG/1
1 TABLET ORAL EVERY 8 HOURS PRN
Qty: 90 TABLET | Refills: 0 | Status: SHIPPED | OUTPATIENT
Start: 2024-04-17 | End: 2024-05-17

## 2024-03-04 RX ORDER — HYDROCODONE BITARTRATE AND ACETAMINOPHEN 7.5; 325 MG/1; MG/1
1 TABLET ORAL EVERY 8 HOURS PRN
Qty: 90 TABLET | Refills: 0 | Status: SHIPPED | OUTPATIENT
Start: 2024-05-17 | End: 2024-05-31 | Stop reason: SDUPTHER

## 2024-03-05 ENCOUNTER — OFFICE VISIT (OUTPATIENT)
Dept: PSYCHIATRY | Facility: CLINIC | Age: 84
End: 2024-03-05
Payer: MEDICARE

## 2024-03-05 VITALS
OXYGEN SATURATION: 96 % | BODY MASS INDEX: 30.17 KG/M2 | DIASTOLIC BLOOD PRESSURE: 56 MMHG | SYSTOLIC BLOOD PRESSURE: 94 MMHG | HEART RATE: 76 BPM | WEIGHT: 198.44 LBS

## 2024-03-05 DIAGNOSIS — G31.84 MCI (MILD COGNITIVE IMPAIRMENT): ICD-10-CM

## 2024-03-05 DIAGNOSIS — F41.1 GAD (GENERALIZED ANXIETY DISORDER): ICD-10-CM

## 2024-03-05 DIAGNOSIS — F33.0 MAJOR DEPRESSIVE DISORDER, RECURRENT EPISODE, MILD: Primary | ICD-10-CM

## 2024-03-05 PROCEDURE — 99999 PR PBB SHADOW E&M-EST. PATIENT-LVL IV: CPT | Mod: PBBFAC,,,

## 2024-03-05 PROCEDURE — 99215 OFFICE O/P EST HI 40 MIN: CPT | Mod: S$GLB,,,

## 2024-03-05 RX ORDER — DULOXETIN HYDROCHLORIDE 60 MG/1
60 CAPSULE, DELAYED RELEASE ORAL DAILY
Qty: 90 CAPSULE | Refills: 3 | Status: SHIPPED | OUTPATIENT
Start: 2024-03-05 | End: 2025-02-28

## 2024-03-05 NOTE — PROGRESS NOTES
"Outpatient Psychiatry Follow-Up Visit   3/5/2024    Clinical Status of Patient:  Outpatient (Ambulatory)    Chief Complaint:  Percy Ramirez is a 83 y.o. male who presents today for follow-up of depression and anxiety.  Met with patient.      Interval History and Content of Current Session 03/05/2024:  Pt is A+Ox 4.  Patients mood is "good", affect appears congruent and appropriate. Pts thought process is normal and logical.  Pts speech is normal tone, normal rate, normal pitch, normal volume   Linear and logical, friendly and cooperative, normal eye contact, no psychomotor retardation.  Pt is calmly seated in chair during interview. Pt is seen using walker in clinic. Pt is using portable O2, nasal canula.      Patient states that he has been doing well since our previous appointment. Continue to take Cymbalta 60 MG QAM. States that his anxiety and depression are well controlled on current medication regimen. Endorses situational anxiety regarding the health of his son. Patient states that his son who he lives with is non non-compliant with all medications.  States that he finds this frustrating but understands he cannot force people to make changes in their lives. Patient states overall he is doing well and is requesting a medication refill.      Pt reports taking medications as prescribed and behaving appropriately during interview today.  Denies SI/HI/AVH. Denies side effects of medications.  Pt reports sleeping well and normal appetite.   Denies recreational drug use. Pt reports 0 drinks per week, denies tobacco use, denies Vaping, none- Caffeine.      Interim Events: 1/5/2024  Pt is A+Ox 4.  Patients mood is "good", affect appears congruent and appropriate. Pts thought process is normal and logical.  Pts speech is normal tone, normal rate, normal pitch, normal volume   Linear and logical, friendly and cooperative, normal eye contact, no psychomotor retardation.  Pt is calmly seated in chair during interview. " "Pt is seen using walker in clinic. Pt is using portable O2, nasal canula.  O2 stat 89 in clinic with Oxygen.      Patient states that he has been doing well since our previous appointment. Continues to take Cymbalta 60MG QAM. Patient states that he attempted to get off of Cymbalta approximately 1 month ago and began experiencing fatigue and brain zaps. Patient resumed three days later. Patient states that he wishes to continue Cymbalta at this time period state said he's had a good holiday season, spent it with family. Currently disabled son lives with him and they have an OK relationship. Patient has been spending his free time going to the [email or web address]. Goes approximately twice a month. Patient is requesting refills at this time.      Reports depression today as 0/10, and anxiety as 0/10.  Pt reports taking medications as prescribed and behaving appropriately during interview today.  Denies SI/HI/AVH. Denies side effects of medications.  Pt reports sleeping well and normal appetite.   Denies recreational drug use. Pt reports 0 drinks per week, denies tobacco use, denies Vaping, none- Caffeine.      Interim Events: 6/16/2023  Pt is A+Ox 4.  Patients mood is "ok", affect appears congruent and appropriate. Pts thought process is normal and logical.  Pts speech is normal tone, normal rate, normal pitch, normal volume   Linear and logical, friendly and cooperative, normal eye contact, no psychomotor retardation.  Pt is calmly seated in chair during interview. Pt is casually dressed and well groomed.  Pt is seen using walker in clinic. Pt is using portable O2, nasal canula.  O2 stat 91 in clinic with Oxygen.      Patient states that he attempted to get off Cymbalta as instructed in clinic. 5 days after discontinuing Cymbalta Pts mood deteriorated: began experiencing anxiety, depression, and irritability. Patient states that he began taking Cymbalta again. Patient is currently taking Cymbalta 30MG QAM. Patient states that he " "now notices a difference when he was on Cymbalta 60 and 30, patient requesting to increase back to Cymbalta 60 at this time.     Patient states that his daughter and grandchildren are no longer living with them, currently living with daughter's boyfriend. Patient states that this has improved his mood. Patient continues to have disabled son living with him. Patient states that their relationship is strained but overall he enjoys having his son living with him.    Reports depression today as 3/10, and anxiety as 3/10.  Pt reports taking medications as prescribed and behaving appropriately during interview today.  Denies SI/HI/AVH. Denies side effects of medications.  Pt reports sleeping well and normal appetite.   Denies recreational drug use. Pt reports 0 drinks per week, denies tobacco use, denies Vaping, none- Caffeine.        Prior visit :  Psych Interview 03/17/2023:   Percy Ramirez is a 82 y.o. male with past psychiatric history of MDD and MCI  presented to for initial evaluation and treatment for mood.     Pt is A+Ox 4.  Patients mood is "ok", affect appears congruent and appropriate. Pts thought process is tangential.  Pts speech is normal tone, normal rate, normal pitch, normal volume  Friendly and cooperative, good eye contact, no psychomotor retardation.  Pt is calmly seated in chair during interview.  Pt is casually dressed and well groomed.  Patient arrives 2 hours early to appointment due to transportation.  Pt is attached to oxygen and is using a Walker to ambulate in clinic. States that at home he uses the walker. No current history of falls.  Patient is hard of hearing and sight.     Patient was previously seeing seen by Isabella Pollard for depression , states that he is currently taking Cymbalta 60 daily. States that he's been doing OK since their last visit and July 2022. States that he does not feel different on Cymbalta 60.  Patient currently has his son , daughter, and her two kids living " "with him. Patient states that he has a house full of people and is Lonely.  Pt states that his biggest stressor currently is his son, states that he is disabled and refuses to work. States that son will stay in his room all day and not assist with household chores.  the patient has not been to talk therapy call mom at this time patient states that he is not interested.  Pt has a long history of Med noncompliance.       Patient states that he has experienced a lot of loss in his life, states that he watched his dad die from my massive heart attack at 13 year old, his wife passed away in 2013, and his oldest son passed away in 2014. Patient endorses a history of trauma while growing up, states that after his father passed away his mother send him to [facility] because she could not take care of him. Patient states that the group home was very strict and he was required to look there for four years.      Denies prior hx of psychiatric hospitalizations. Denies hx of suicide attempts. Pt denies hx self harm. Pt denies hx hallucinations.  Pt denies hx of eating disorders.   Pt Endorses hx trauma. Denies physical/sexual abuse. Pt denies symptoms including nightmares, hypervigilance, flashbacks, avoidance behaviors, and disassociation.     Reports depression today as 1/10, and anxiety as 2/10.  Reports sleeping 3 hrs per night, and poor appetite.   Denies SI/HI/AVH. Denies side effects of medications.  Pt states that there support consists of "no one"  Denies recreational drug use. Pt reports 0 drinks per week, denies tobacco use, denies Vaping, none- Caffeine.       Current Medication:  Cymbalta 60mg daily      Past meds: none     DX:  The patient complained of depressed mood with lethargy, decreased appetite , insomnia, psycho-motor retardation, anhedonia, apathy, worsening self-esteem, guilt, decreased concentration and ability to make decisions     Pt denies hx symptoms/episodes of yulia.     Admits to " symptoms of anxiety including excessive anxiety/worry/fear, more days than not, about numerous issues, difficulty controlling the worry, over thinking, rumination, restlessness, poor concentration, fatigue, and increased irritability. Denies panic attacks at this time.     Past Psychiatric History:   Previous Psychiatric Hospitalizations: NO  Previous Medication Trials: NO  History of psychotherapy:  NO  Previous Suicide Attempts: NO  History of Violence:  NO  History of physical/sexual abuse: NO  Outpatient psychiatric provider(past): YES:         Substance Abuse History:   Tobacco: NO  Alcohol: NO  Illicit Substances: NO  Detox/Rehab: NO     Neurological History:   Seizures: NO  Head trauma: NO     Family Psychiatric History: Yes -   Mother - depression , anxiety, substance abuse     Social History:  Developmental/Childhood:Achieved all developmental milestones timely  *Education: 9th grade  Employment Status/Finances:Retired   Relationship Status/Sexual Orientation:    Children: 2  Housing Status: Home    history:  YES:      Access to gun: YES: not locked up     Protestant:Non-practicing  Recreational activities: Nothing   Person patient is closest to/confides in: No one     Legal History:   Past Charges/Incarcerations:  No      Review of Systems     Review of Systems   Constitutional:  Negative for weight loss.   HENT:  Negative for tinnitus.    Eyes:  Negative for blurred vision.   Respiratory:  Negative for cough and shortness of breath.    Cardiovascular:  Negative for chest pain.   Gastrointestinal:  Negative for abdominal pain.   Genitourinary:  Negative for dysuria.   Musculoskeletal:  Negative for back pain and neck pain.   Skin:  Negative for rash.   Neurological:  Negative for dizziness, seizures and weakness.   Psychiatric/Behavioral:  Negative for depression, hallucinations, memory loss, substance abuse and suicidal ideas. The patient is not nervous/anxious and does not have insomnia.   "      Psychiatric Review Of Systems - Is patient experiencing or having changes in:  sleep: no  appetite: no  weight: no  energy/anergy: no  interest/pleasure/anhedonia: no  somatic symptoms: no  libido: no  anxiety/panic: no  guilty/hopelessness: no  concentration: no  S.I.B.s/risky behavior: no  Irritability: no  Racing thoughts: no  Impulsive behaviors: no  Paranoia: no  AVH: no      Past Medical, Family and Social History: The patient's past medical, family and social history have been reviewed and updated as appropriate within the electronic medical record - see encounter notes.      Current Medications:   Medication List with Changes/Refills   Current Medications    ACETAMINOPHEN (TYLENOL) 325 MG TABLET    Take 2 tablets (650 mg total) by mouth every 6 (six) hours as needed.    ALBUTEROL (PROVENTIL/VENTOLIN HFA) 90 MCG/ACTUATION INHALER    INHALE 2 PUFFS BY MOUTH EVERY 6 HOURS AS NEEDED FOR WHEEZING OR  SHORTNESS  OF  BREATH    ATORVASTATIN (LIPITOR) 40 MG TABLET    Take 40 mg by mouth once daily.    BD INSULIN PEN NEEDLE UF SHORT 31 GAUGE X 5/16" NDLE        BD INSULIN SYRINGE ULTRA-FINE 1/2 ML 31 GAUGE X 15/64" SYRG        BLOOD SUGAR DIAGNOSTIC STRP    To check BG 3 times daily, to use with insurance preferred meter    BLOOD-GLUCOSE METER KIT    To check BG 3 times daily, to use with insurance preferred meter    CARVEDILOL (COREG) 25 MG TABLET    Take 1 tablet (25 mg total) by mouth 2 (two) times daily.    CERAMIDES 1,3,6-II (CERAVE DAILY MOISTURIZING) LOTN    Apply twice daily to skin    CINNAMON BARK 500 MG CAPSULE    Take 1,000 mg by mouth once daily.      CLOPIDOGREL (PLAVIX) 75 MG TABLET    Take 75 mg by mouth.    COMIRNATY 2023-24, 12Y UP,,PF, 30 MCG/0.3 ML INJECTION        DICLOFENAC SODIUM (VOLTAREN) 1 % GEL    Apply 2 g topically 2 (two) times daily.    DICLOFENAC SODIUM (VOLTAREN) 1 % GEL    Apply 2 g topically 3 (three) times daily.    ENTRESTO  MG PER TABLET    Take 1 tablet by mouth 2 " "(two) times daily.    FLUAD QUAD 2023-24,65Y UP,,PF, 60 MCG (15 MCG X 4)/0.5 ML SYRG        FLUTICASONE PROPIONATE (FLONASE) 50 MCG/ACTUATION NASAL SPRAY    1 spray (50 mcg total) by Each Nostril route 2 (two) times daily.    FUROSEMIDE (LASIX) 40 MG TABLET    Take 40 mg by mouth once daily.    GABAPENTIN (NEURONTIN) 300 MG CAPSULE    Take 4 capsules (1,200 mg total) by mouth 2 (two) times daily.    GLIMEPIRIDE (AMARYL) 4 MG TABLET    Take 4 mg by mouth daily with breakfast.     HYDROCODONE-ACETAMINOPHEN (NORCO) 7.5-325 MG PER TABLET    Take 1 tablet by mouth every 8 (eight) hours as needed for Pain.    HYDROCODONE-ACETAMINOPHEN (NORCO) 7.5-325 MG PER TABLET    Take 1 tablet by mouth every 8 (eight) hours as needed for Pain.    HYDROCODONE-ACETAMINOPHEN (NORCO) 7.5-325 MG PER TABLET    Take 1 tablet by mouth every 8 (eight) hours as needed for Pain.    HYDROCODONE-ACETAMINOPHEN (NORCO) 7.5-325 MG PER TABLET    Take 1 tablet by mouth every 8 (eight) hours as needed for Pain.    INSULIN NPH-INSULIN REGULAR, 70/30, (NOVOLIN 70/30) 100 UNIT/ML (70-30) INJECTION    Inject into the skin. Inject 10 units at breakfast and inject 10 units at night    INSULIN SYRINGE-NEEDLE U-100 0.3 ML 31 GAUGE X 15/64" SYRG    USE TO INJECT INSULIN THREE TIMES DAILY    INSULIN SYRINGE-NEEDLE U-100 1/2 ML 31 X 5/16" SYRG        IPRATROPIUM (ATROVENT) 42 MCG (0.06 %) NASAL SPRAY    2 sprays by Nasal route every 6 (six) hours as needed for Rhinitis (use as needed for runny nose and also use 15 minutes prior to meal). Clean nose with saline prior to use    ISOSORBIDE MONONITRATE (IMDUR) 30 MG 24 HR TABLET    Take 30 mg by mouth once daily.    LANCETS MISC    To check BG 3 times daily, to use with insurance preferred meter    LIDOCAINE (LIDODERM) 5 %    Place 1 patch onto the skin once daily. Remove & Discard patch within 12 hours or as directed by MD    MECLIZINE (ANTIVERT) 12.5 MG TABLET    Take 1 tablet (12.5 mg total) by mouth 3 (three) " "times daily as needed for Dizziness.    METFORMIN (GLUCOPHAGE) 500 MG TABLET    Take 500 mg by mouth 2 (two) times daily.    METHOCARBAMOL (ROBAXIN) 500 MG TAB    TAKE 1 TABLET BY MOUTH 4 TIMES DAILY FOR 10 DAYS    NITROGLYCERIN (NITROSTAT) 0.4 MG SL TABLET    DISSOLVE 1 TABLET UNDER THE TONGUE EVERY 5 MINUTES AS  NEEDED FOR CHEST PAIN. MAX  OF 3 TABLETS IN 15 MINUTES. CALL 911 IF PAIN PERSISTS.    PRAVASTATIN (PRAVACHOL) 20 MG TABLET    Take 1 tablet (20 mg total) by mouth once daily.    SPIRONOLACTONE (ALDACTONE) 25 MG TABLET    Take 25 mg by mouth.    TIOTROPIUM-OLODATEROL (STIOLTO RESPIMAT) 2.5-2.5 MCG/ACTUATION MIST    Inhale 2 puffs into the lungs once daily. Controller    TRIAMCINOLONE ACETONIDE 0.1% (KENALOG) 0.1 % CREAM    SMARTSI Application Topical 2-3 Times Daily    VALSARTAN (DIOVAN) 80 MG TABLET    Take 80 mg by mouth.    VIT C/VIT E AC/LUT/COPPER/ZINC (PRESERVISION LUTEIN ORAL)    Take by mouth.    WARFARIN (COUMADIN) 5 MG TABLET    Take 2 tabs by mouth on Tuesday,Thursday, Saturday and Sundays then 1.5 on all other days.   Changed and/or Refilled Medications    Modified Medication Previous Medication    DULOXETINE (CYMBALTA) 60 MG CAPSULE DULoxetine (CYMBALTA) 60 MG capsule       Take 1 capsule (60 mg total) by mouth once daily.    Take 1 capsule (60 mg total) by mouth once daily.         Allergies:   Review of patient's allergies indicates:   Allergen Reactions    Aricept odt [donepezil] Diarrhea and Nausea And Vomiting    Codeine Nausea Only     weak    Ranexa [ranolazine]          Vitals   Vitals:    24 0858   BP: (!) 94/56   Pulse: 76              Labs/Imaging/Studies:   No results found for this or any previous visit (from the past 48 hour(s)).   No results found for: "PHENYTOIN", "PHENOBARB", "VALPROATE", "CBMZ"    Compliance: yes    Side effects: None    Risk Parameters:  Patient reports no suicidal ideation  Patient reports no homicidal ideation  Patient reports no self-injurious " behavior  Patient reports no violent behavior    Exam (detailed: at least 9 elements; comprehensive: all 15 elements)   Constitutional  Vitals:  Most recent vital signs, dated less than 90 days prior to this appointment, were reviewed.   Vitals:    03/05/24 0858   BP: (!) 94/56   Pulse: 76   SpO2: 96%   Weight: 90 kg (198 lb 6.6 oz)            General:  unremarkable, age appropriate     Musculoskeletal  Muscle Strength/Tone:  no spasicity, no rigidity, no cogwheeling, no flaccidity, no paratonia, no dyskinesia, no dystonia, no tremor, no tic, no choreoathetosis, no atrophy   Gait & Station:  uses walker     Psychiatric  Speech:  no latency; no press   Mood & Affect:  steady  congruent and appropriate   Thought Process:  normal and logical   Associations:  intact   Thought Content:  normal, no suicidality, no homicidality, delusions, or paranoia   Insight:  intact, has awareness of illness   Judgement: behavior is adequate to circumstances, age appropriate   Orientation:  grossly intact, person, place, situation, time/date   Memory: intact for content of interview, grossly intact, memory >3 objects at five mins   Language: grossly intact, able to name, able to repeat   Attention Span & Concentration:  able to focus, completed tasks   Fund of Knowledge:  intact and appropriate to age and level of education, familiar with aspects of current personal life     Assessment and Diagnosis   Status/Progress: Based on the examination today, the patient's problem(s) is/are resolving.  New problems have not been presented today.       General Impression:      ICD-10-CM ICD-9-CM   1. Major depressive disorder, recurrent episode, mild  F33.0 296.31   2. MCI (mild cognitive impairment)  G31.84 331.83   3. GERALD (generalized anxiety disorder)  F41.1 300.02           Intervention/Counseling/Treatment Plan   Mood   Continue Cymbalta 60mg QAM - Targeting anxiety and depression  Labs reviewed, Kideny function stable.  Will continue at  current dose.  May need to decrease if kidney function worsens.       Patient states that at this time he does not want to see a talk therapist, I discussed extensively with patient that I believe this would be beneficial in coping with the current stressors he is experiencing. Will reassess that next appointment.       Attempted to perform MOCA in clinic.  Pt requested to perform test next appt.          Discussed diagnosis, risks and benefits of proposed treatment above vs alternative treatments vs no treatment, and potential side effects of these treatments, and the inherent unpredictability of individual response to treatment.  The patient expresses understanding and gives informed consent to pursue treatment.  The potential benefits outweigh the potential risks. Patient has no other questions. Risks/adverse effects discussed at this time including but not limited to: GI side effects, sexual dysfunction, activation vs sedation, triggering of suicidal thoughts, and serotonin syndrome.    Serotonin syndrome   Mental status changes can include anxiety, restlessness, disorientation, and agitated delirium.    Autonomic manifestations can include diaphoresis, tachycardia, hyperthermia, hypertension, vomiting, and diarrhea   Neuromuscular hyperactivity can manifest as tremor, myoclonus, hyperreflexia, rigidity, hyperthermia, seizure, and bilateral Babinski sign.   Pt was informed that if they experience any of these symptoms to go the ED.     Difficulty Sleeping Behavioral Modification:  Implement stimulus control: [place] bedroom for sleep only. Leave bedroom when frustrated from not sleeping. Engage in relaxation before returning. Engage in activities during the day. AVOID >7-8 h time in bed  Avoid clock watching  Avoid thinking/worrying about sleep when trying to fall asleep  Limit caffeinated consumption  Make sure the bedroom is dark, quiet and cool    Safety Plan   Patient voices understanding and agreement with  this plan  Provided crisis numbers  Encouraged patient to keep future appointments.  Instructed patient to call or message with questions or concerns  In the event of an emergency, including suicidal ideation, patient was advised to go to the emergency room and/or call 911    Return to Clinic: 6 months, as needed    Psychotherapy:  Target symptoms: depression, anxiety   Why chosen therapy is appropriate versus another modality: relevant to diagnosis, evidence based practice  Outcome monitoring methods: self-report, observation  Therapeutic intervention type: insight oriented psychotherapy, interactive psychotherapy  Topics discussed/themes: relationships difficulties, building skills sets for symptom management, symptom recognition  The patient's response to the intervention is guarded. The patient's progress toward treatment goals is fair.   Duration of intervention: 15 minutes.    Total face to face time: 30 min  Total time (chart review, patient contact, documentation): 40 min    A diagnostic psychiatric evaluation was performed and responsiveness to treatment was assessed.  The patient demonstrates adequate ability/capacity to respond to treatment.    Lauro Parada PA-C    *This note has been prepared using a combination of a dictation device and typing.  It has been checked for errors but some errors may still exist within the note as a result of speech recognition errors and/or typographical errors.

## 2024-03-07 ENCOUNTER — TELEPHONE (OUTPATIENT)
Dept: ORTHOPEDICS | Facility: CLINIC | Age: 84
End: 2024-03-07
Payer: MEDICARE

## 2024-03-07 ENCOUNTER — TELEPHONE (OUTPATIENT)
Dept: FAMILY MEDICINE | Facility: CLINIC | Age: 84
End: 2024-03-07
Payer: MEDICARE

## 2024-03-07 ENCOUNTER — TELEPHONE (OUTPATIENT)
Dept: PAIN MEDICINE | Facility: CLINIC | Age: 84
End: 2024-03-07
Payer: MEDICARE

## 2024-03-07 ENCOUNTER — DOCUMENTATION ONLY (OUTPATIENT)
Dept: PAIN MEDICINE | Facility: CLINIC | Age: 84
End: 2024-03-07
Payer: MEDICARE

## 2024-03-07 DIAGNOSIS — M65.30 TRIGGER FINGER, UNSPECIFIED FINGER, UNSPECIFIED LATERALITY: Primary | ICD-10-CM

## 2024-03-07 DIAGNOSIS — M79.646 PAIN OF FINGER, UNSPECIFIED LATERALITY: Primary | ICD-10-CM

## 2024-03-07 NOTE — TELEPHONE ENCOUNTER
----- Message from Lakeshia Samson sent at 3/7/2024 11:09 AM CST -----  Regarding: self [phone]  Type:  Patient Requesting Referral    Who Called:self    Referral to What Specialty: ORTHO    Reason for Referral: finger pain    Does the patient want the referral with a specific physician?: no    [facility]    Would the patient rather a call back or a response via [facility]? Call back    Best Call Back Number: [phone]

## 2024-03-07 NOTE — TELEPHONE ENCOUNTER
Left voicemail advised pt a referral to see orthopedics was sent.              ----- Message from Kaci Aquino sent at 3/7/2024  2:01 PM CST -----  Regarding: Injection for Pain in Fingers  Type:  Needs Medical Advice    Who Called: Percy (Patient)   Symptoms (please be specific): Pain in fingers. Patient states it is trigger finger  Would the patient rather a call back or a response via [facility]? Phone call   Best Call Back Number: [phone]  Additional Information: Patient asks if there is an injection he can get that would lessen the pain in his fingers? Please call patient to advise. Thank you!

## 2024-03-18 LAB
EGFR: 45 ML/MIN/1.73M2
HBA1C MFR BLD: 9.8 % (ref 4–5.6)

## 2024-03-19 DIAGNOSIS — M65.30 TRIGGER FINGER, UNSPECIFIED FINGER, UNSPECIFIED LATERALITY: Primary | ICD-10-CM

## 2024-03-20 ENCOUNTER — OFFICE VISIT (OUTPATIENT)
Dept: ORTHOPEDICS | Facility: CLINIC | Age: 84
End: 2024-03-20
Payer: MEDICARE

## 2024-03-20 DIAGNOSIS — M79.646 PAIN OF FINGER, UNSPECIFIED LATERALITY: ICD-10-CM

## 2024-03-20 DIAGNOSIS — M65.30 TRIGGER FINGER, UNSPECIFIED FINGER, UNSPECIFIED LATERALITY: ICD-10-CM

## 2024-03-20 DIAGNOSIS — M65.332 ACQUIRED TRIGGER FINGER OF LEFT MIDDLE FINGER: Primary | ICD-10-CM

## 2024-03-20 PROCEDURE — 99204 OFFICE O/P NEW MOD 45 MIN: CPT | Mod: 25,S$GLB,, | Performed by: ORTHOPAEDIC SURGERY

## 2024-03-20 PROCEDURE — 99999 PR PBB SHADOW E&M-EST. PATIENT-LVL V: CPT | Mod: PBBFAC,,, | Performed by: ORTHOPAEDIC SURGERY

## 2024-03-20 PROCEDURE — 20550 NJX 1 TENDON SHEATH/LIGAMENT: CPT | Mod: LT,S$GLB,, | Performed by: ORTHOPAEDIC SURGERY

## 2024-03-20 RX ORDER — TRIAMCINOLONE ACETONIDE 40 MG/ML
10 INJECTION, SUSPENSION INTRA-ARTICULAR; INTRAMUSCULAR
Status: DISCONTINUED | OUTPATIENT
Start: 2024-03-20 | End: 2024-03-20 | Stop reason: HOSPADM

## 2024-03-20 RX ADMIN — TRIAMCINOLONE ACETONIDE 10 MG: 40 INJECTION, SUSPENSION INTRA-ARTICULAR; INTRAMUSCULAR at 10:03

## 2024-03-20 NOTE — PROGRESS NOTES
"Assessment: 83 y.o. male with Trigger fingers of the RLF, LRF, LLF, left knee arthritis     I explained my diagnostic impression and the reasoning behind it in detail, using layman's terms.      Plan:   - Injection to LLF trigger finger - half dose. Cannot inject other fingers today due to poorly controlled DM. Discussed risks of elevated glucose  - RTC PRN for knee and other trigger finger  - Keep close eye on blood glucose for next 2 weeks      All questions were answered in detail. The patient is in full agreement with the treatment plan and will proceed accordingly.    Chief Complaint   Patient presents with    Right Hand - Pain    Left Hand - Pain       Initial visit (3/20/24): Percy Ramirez is a 83 y.o. male who presents today complaining of Pain of the Right Hand and Pain of the Left Hand     Triggering of the RLF, LRF, LLF for several months   Worse with tight    Pain to A1 pulley, triggering and popping   Previous LLF and RLF releases with Dr Perez several years ago  No numbness or tingling     Also has L knee pain       This patient was seen in consultation at the request of Dr. Kasie Edmondson    This is the extent of the patient's complaints at this time.        Review of patient's allergies indicates:   Allergen Reactions    Aricept odt [donepezil] Diarrhea and Nausea And Vomiting    Codeine Nausea Only     weak    Ranexa [ranolazine]          Current Outpatient Medications:     acetaminophen (TYLENOL) 325 MG tablet, Take 2 tablets (650 mg total) by mouth every 6 (six) hours as needed., Disp: 13 tablet, Rfl: 0    albuterol (PROVENTIL/VENTOLIN HFA) 90 mcg/actuation inhaler, INHALE 2 PUFFS BY MOUTH EVERY 6 HOURS AS NEEDED FOR WHEEZING OR  SHORTNESS  OF  BREATH, Disp: 54 g, Rfl: 3    atorvastatin (LIPITOR) 40 MG tablet, Take 40 mg by mouth once daily., Disp: , Rfl:     BD INSULIN PEN NEEDLE UF SHORT 31 gauge x 5/16" Ndle, , Disp: , Rfl:     BD INSULIN SYRINGE ULTRA-FINE 1/2 mL 31 gauge x 15/64" " Syrg, , Disp: , Rfl:     blood sugar diagnostic Strp, To check BG 3 times daily, to use with insurance preferred meter, Disp: 200 strip, Rfl: 11    carvedilol (COREG) 25 MG tablet, Take 1 tablet (25 mg total) by mouth 2 (two) times daily., Disp: 180 tablet, Rfl: 0    ceramides 1,3,6-II (CERAVE DAILY MOISTURIZING) Lotn, Apply twice daily to skin, Disp: 355 mL, Rfl: 1    cinnamon bark 500 mg capsule, Take 1,000 mg by mouth once daily.  , Disp: , Rfl:     clopidogreL (PLAVIX) 75 mg tablet, Take 75 mg by mouth., Disp: , Rfl:     COMIRNATY 2023-24, 12Y UP,,PF, 30 mcg/0.3 mL injection, , Disp: , Rfl:     diclofenac sodium (VOLTAREN) 1 % Gel, Apply 2 g topically 2 (two) times daily., Disp: 100 g, Rfl: 0    diclofenac sodium (VOLTAREN) 1 % Gel, Apply 2 g topically 3 (three) times daily., Disp: 50 g, Rfl: 0    DULoxetine (CYMBALTA) 60 MG capsule, Take 1 capsule (60 mg total) by mouth once daily., Disp: 90 capsule, Rfl: 3    ENTRESTO  mg per tablet, Take 1 tablet by mouth 2 (two) times daily., Disp: , Rfl:     FLUAD QUAD 2023-24,65Y UP,,PF, 60 mcg (15 mcg x 4)/0.5 mL Syrg, , Disp: , Rfl:     fluticasone propionate (FLONASE) 50 mcg/actuation nasal spray, 1 spray (50 mcg total) by Each Nostril route 2 (two) times daily., Disp: 18.2 mL, Rfl: 3    furosemide (LASIX) 40 MG tablet, Take 40 mg by mouth once daily., Disp: , Rfl:     gabapentin (NEURONTIN) 300 MG capsule, Take 4 capsules (1,200 mg total) by mouth 2 (two) times daily. (Patient taking differently: Take 900 mg by mouth 2 (two) times daily.), Disp: 540 capsule, Rfl: 1    glimepiride (AMARYL) 4 MG tablet, Take 4 mg by mouth daily with breakfast. , Disp: , Rfl:     HYDROcodone-acetaminophen (NORCO) 7.5-325 mg per tablet, Take 1 tablet by mouth every 8 (eight) hours as needed for Pain., Disp: 90 tablet, Rfl: 0    [START ON 4/17/2024] HYDROcodone-acetaminophen (NORCO) 7.5-325 mg per tablet, Take 1 tablet by mouth every 8 (eight) hours as needed for Pain., Disp: 90  "tablet, Rfl: 0    [START ON 5/17/2024] HYDROcodone-acetaminophen (NORCO) 7.5-325 mg per tablet, Take 1 tablet by mouth every 8 (eight) hours as needed for Pain., Disp: 90 tablet, Rfl: 0    insulin NPH-insulin regular, 70/30, (NOVOLIN 70/30) 100 unit/mL (70-30) injection, Inject into the skin. Inject 10 units at breakfast and inject 10 units at night, Disp: , Rfl:     insulin syringe-needle U-100 0.3 mL 31 gauge x 15/64" Syrg, USE TO INJECT INSULIN THREE TIMES DAILY, Disp: , Rfl:     insulin syringe-needle U-100 1/2 mL 31 x 5/16" Syrg, , Disp: , Rfl:     ipratropium (ATROVENT) 42 mcg (0.06 %) nasal spray, 2 sprays by Nasal route every 6 (six) hours as needed for Rhinitis (use as needed for runny nose and also use 15 minutes prior to meal). Clean nose with saline prior to use, Disp: 15 mL, Rfl: 3    isosorbide mononitrate (IMDUR) 30 MG 24 hr tablet, Take 30 mg by mouth once daily., Disp: , Rfl:     lancets Misc, To check BG 3 times daily, to use with insurance preferred meter, Disp: 200 each, Rfl: 11    LIDOcaine (LIDODERM) 5 %, Place 1 patch onto the skin once daily. Remove & Discard patch within 12 hours or as directed by MD, Disp: 5 patch, Rfl: 1    meclizine (ANTIVERT) 12.5 mg tablet, Take 1 tablet (12.5 mg total) by mouth 3 (three) times daily as needed for Dizziness., Disp: 90 tablet, Rfl: 0    metFORMIN (GLUCOPHAGE) 500 MG tablet, Take 500 mg by mouth 2 (two) times daily., Disp: , Rfl:     methocarbamoL (ROBAXIN) 500 MG Tab, TAKE 1 TABLET BY MOUTH 4 TIMES DAILY FOR 10 DAYS, Disp: 60 tablet, Rfl: 0    nitroGLYCERIN (NITROSTAT) 0.4 MG SL tablet, DISSOLVE 1 TABLET UNDER THE TONGUE EVERY 5 MINUTES AS  NEEDED FOR CHEST PAIN. MAX  OF 3 TABLETS IN 15 MINUTES. CALL 911 IF PAIN PERSISTS., Disp: , Rfl:     pravastatin (PRAVACHOL) 20 MG tablet, Take 1 tablet (20 mg total) by mouth once daily., Disp: 90 tablet, Rfl: 1    spironolactone (ALDACTONE) 25 MG tablet, Take 25 mg by mouth., Disp: , Rfl:     tiotropium-olodateroL " (STIOLTO RESPIMAT) 2.5-2.5 mcg/actuation Mist, Inhale 2 puffs into the lungs once daily. Controller, Disp: 1 g, Rfl: 11    triamcinolone acetonide 0.1% (KENALOG) 0.1 % cream, SMARTSI Application Topical 2-3 Times Daily, Disp: , Rfl:     valsartan (DIOVAN) 80 MG tablet, Take 80 mg by mouth., Disp: , Rfl:     VIT C/VIT E AC/LUT/COPPER/ZINC (PRESERVISION LUTEIN ORAL), Take by mouth., Disp: , Rfl:     warfarin (COUMADIN) 5 MG tablet, Take 2 tabs by mouth on Tuesday,Thursday, Saturday and Sundays then 1.5 on all other days., Disp: , Rfl:     blood-glucose meter kit, To check BG 3 times daily, to use with insurance preferred meter, Disp: 1 each, Rfl: 0    Physical Exam:   Vitals:    24 1052   PainSc: 10-Worst pain ever   PainLoc: Hand       General:  Patient is alert, awake and oriented to time, place and person. Mood and affect are appropriate.  Patient does not appear to be in any distress, denies any constitutional symptoms and appears stated age.   HEENT:  Pupils are equal and round, sclera are not injected. External examination of ears and nose reveals no abnormalities. Cranial nerves II-X are grossly intact  Skin:  no rashes, abrasions or open wounds on the affected extremity   Resp:  No respiratory distress or audible wheezing   CV: 2+  pulses, all extremities warm and well perfused   Left and Right Hand   Triggering and palpable painful nodules at A1 pulley of RLF, LRF, LLF   negative tinels   Well healed incisions from release of RLF and LLF A1 pulleys   FROM digits  LTSI m/u/r  2+ RP  + EPL, IO, FDS, FDP        Imaging: 3 views of the bilateral hands show degenerative changes at multiple IP and MCP joints    I personally reviewed and interpreted the patient's imaging obtained today in clinic       A note notifying Dr. Kasie Edmondson of my findings was sent via the electronic medical record     This note was created by combination of typed  and M-Modal dictation. Transcription and phonetic  errors may be present.  If there are any questions, please contact me.    Past Medical History:   Diagnosis Date    Allergy     Arthritis     CAD, multiple vessel     DR Melissa    Cataract     Depression     History of colon polyps     Hyperlipidemia     Hypertension     Macular degeneration     Dr Razo for injection, Jennifer for eye    S/P CABG x 2     Type 2 diabetes mellitus with circulatory disorder     Dr. Aquino       Active Problem List with Overview Notes    Diagnosis Date Noted    Cerebral atherosclerosis 11/13/2023    Spinal stenosis of lumbar region with neurogenic claudication 02/24/2023    Lumbar radiculopathy 02/24/2023    Lumbar spondylosis 02/24/2023    DDD (degenerative disc disease), lumbar 02/24/2023    Chronic tension-type headache, intractable 04/08/2022    History of stroke 04/04/2022     - right occipital        Exudative age-related macular degeneration, right eye, with active choroidal neovascularization 03/29/2022    Secondary hyperparathyroidism of renal origin 11/06/2020    Noncompliance with medication regimen 11/06/2020    History of cardioversion 09/23/2020    ILD (interstitial lung disease) 05/12/2020     -Emphysematous changes on ct along with basilar reticulation.  pft with moderate obstructive and mild restrictive physiology.  dlco significantly reduced (pt on home oxygen)  -Monitor lung function stable fvc and tlc      Erectile dysfunction 01/24/2020    Walker as ambulation aid 11/06/2019    Pulmonary nodule 03/20/2019     0.5 mm 5/2020, serial monitor        Dyspnea on exertion 03/20/2019     multiple etiologies.  Cardiac (CHF, pulmonary htn) +/- copd +/- decondition.  Patient on stiolto.  Cont with albuterol prn.  Patient quit smoking 2004.        Dizziness and giddiness 02/11/2019     Discussed use of meclizine is his primary medication to address vertigo.  Valium was prescribed in the past which worked better per ENT prior notes. Discussed that the medication can and will  cause increased balance dysfunction and possible weakness given is nature.  Patient advised that if he uses the medication he should not he is about more than necessary in order to prevent or reduce his risk of falls.  Patient voiced understanding. Does have continued tinnitus in which he has seen ENT for in past for his vertigo. PCP giving meclizine.       Chronic combined systolic and diastolic heart failure 11/30/2018     -EF 30-40%  -entresto and follow by Dr. Valderrama.        MCI (mild cognitive impairment) 06/18/2018     Patient vision, lack of activity, and jail have all contribute to the patient's decline in his overall mental health.  No evidence of dementia.  Patient stable on current dose of Aricept.        Osteoarthritis of carpometacarpal (CMC) joint of left thumb 11/22/2017    Goals of care, counseling/discussion 10/04/2017     5/1/23  Advance Care Planning   During this visit, I engaged the patient in the advance care planning process.  The patient and I reviewed the role for advance directives and their purpose in directing future healthcare if the patient's unable to speak for him/herself.  At this point in time, the patient does have full decision-making capacity.  We discussed different extreme health states that patient could experience, and reviewed what kind of medical care patient would want in those situations.  The patient communicated that if he was comatose and had little chance of a meaningful recovery, he would NOT want machines/life-sustaining treatments used.  In addition to the above preference, she/he patient  HAS completed a living will to reflect these preferences.  The patient HAS already designated, Kalyani Felder, as healthcare power of  to make decisions on her behalf.  In the case of cardiopulmonary arrest, patient does wish for CPR, intubation or cardioversion.         Persistent atrial fibrillation 03/06/2017     - on coumadin  - [facility]  [place]        Chronic stable angina 03/06/2017    Senile purpura 03/06/2017    Poorly controlled type 2 diabetes mellitus with neuropathy 07/29/2016     Plan to switch from gabapentin to Lyrica to afford better pain control. Plan to do EMG/NCS if no improvement at that time.        Bilateral carotid artery disease 07/29/2016    Hyperlipidemia LDL goal <100 07/29/2016    Type 2 diabetes, with peripheral circulatory disorder not at goal 07/29/2016    COPD with chronic bronchitis and emphysema 07/29/2016     - Quit smoking since 1960s.   -fev1 52%  -Patient is up to date with vaccination.    -Declines pulmonary rehab  -On home oxygen which helps  -continue with Stiolto  -walker and wheelchair dependant.       Atherosclerosis of abdominal aorta 07/29/2016     - noted on ultrasound 10/25/2013        Overweight (BMI 25.0-29.9) 07/29/2016    DJD (degenerative joint disease), lumbar 04/30/2015    Primary osteoarthritis of both knees 02/25/2015     - followed by Dr. Cardenas        Chronic low back pain 02/25/2015    Insulin long-term use 02/16/2015    Anxiety state, unspecified 10/24/2013     Dx updated per 2019 IMO Load        Obstructive sleep apnea treated with BiPAP 07/27/2012     -ahi of 31.7.  Patient is using and benefiting from bipap 16/10.         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 Aquino at         Coronary artery disease      - followed by cardiology, Dr. Melissa    Overview:   s/p CABG        S/P CABG x 2     Macular degeneration      - followed by Dr. Arevalo           Past Surgical History:   Procedure Laterality Date    broken elbow      left    COLONOSCOPY N/A 10/4/2017    Procedure: COLONOSCOPY;  Surgeon: Gabriel Leung MD;  Location: [facility];  Service: Endoscopy;  Laterality: N/A;    EYE SURGERY      cataract    heart bypass       2004    HERNIA REPAIR      right    ROTATOR CUFF REPAIR      right  2004       Family History   Problem Relation Age of Onset    Arthritis Mother     Diabetes Mother     Stroke Mother     Heart attack Father     Cancer Brother     Throat cancer Brother     Diabetes Maternal Aunt     Diabetes Maternal Uncle     Heart disease Maternal Uncle     Diabetes Paternal Aunt     Heart disease Paternal Aunt     Heart disease Paternal Uncle     Heart disease Maternal Grandmother     Cancer Maternal Grandfather

## 2024-03-20 NOTE — PROCEDURES
Tendon Sheath    Date/Time: 3/20/2024 10:15 AM    Performed by: Elda Campbell MD  Authorized by: Elda Campbell MD    Consent Done?:  Yes (Verbal)  Indications:  Pain  Timeout: prior to procedure the correct patient, procedure, and site was verified    Prep: patient was prepped and draped in usual sterile fashion      Local anesthesia used?: Yes    Local anesthetic:  Topical anesthetic  Location:  Long finger  Site:  L long flexor tendon sheath  Needle size:  25 G  Approach:  Volar  Medications:  10 mg triamcinolone acetonide 40 mg/mL  Patient tolerance:  Patient tolerated the procedure well with no immediate complications

## 2024-03-26 ENCOUNTER — TELEPHONE (OUTPATIENT)
Dept: FAMILY MEDICINE | Facility: CLINIC | Age: 84
End: 2024-03-26
Payer: MEDICARE

## 2024-03-26 DIAGNOSIS — M25.562 LEFT KNEE PAIN, UNSPECIFIED CHRONICITY: Primary | ICD-10-CM

## 2024-03-26 NOTE — TELEPHONE ENCOUNTER
----- Message from Donna Gillis sent at 3/26/2024  4:25 PM CDT -----  Regarding: patient call back  Type: Patient Call Back    Who called: Self     What is the request in detail: asked if he is up date on his vaccines and shots and if not he would like to know    Can the clinic reply by [facility]? No     Would the patient rather a call back or a response via [facility]? Call     Best call back number: [phone]

## 2024-04-01 ENCOUNTER — OFFICE VISIT (OUTPATIENT)
Dept: ORTHOPEDICS | Facility: CLINIC | Age: 84
End: 2024-04-01
Payer: MEDICARE

## 2024-04-01 DIAGNOSIS — M25.562 LEFT KNEE PAIN, UNSPECIFIED CHRONICITY: Primary | ICD-10-CM

## 2024-04-01 PROCEDURE — 20610 DRAIN/INJ JOINT/BURSA W/O US: CPT | Mod: LT,S$GLB,, | Performed by: ORTHOPAEDIC SURGERY

## 2024-04-01 PROCEDURE — 99213 OFFICE O/P EST LOW 20 MIN: CPT | Mod: 25,S$GLB,, | Performed by: ORTHOPAEDIC SURGERY

## 2024-04-01 PROCEDURE — 99999 PR PBB SHADOW E&M-EST. PATIENT-LVL III: CPT | Mod: PBBFAC,,, | Performed by: ORTHOPAEDIC SURGERY

## 2024-04-01 RX ORDER — MUPIROCIN 20 MG/G
OINTMENT TOPICAL 3 TIMES DAILY
Qty: 1 G | Refills: 1 | Status: SHIPPED | OUTPATIENT
Start: 2024-04-01

## 2024-04-01 RX ORDER — TRIAMCINOLONE ACETONIDE 40 MG/ML
40 INJECTION, SUSPENSION INTRA-ARTICULAR; INTRAMUSCULAR
Status: DISCONTINUED | OUTPATIENT
Start: 2024-04-01 | End: 2024-04-01 | Stop reason: HOSPADM

## 2024-04-01 RX ADMIN — TRIAMCINOLONE ACETONIDE 40 MG: 40 INJECTION, SUSPENSION INTRA-ARTICULAR; INTRAMUSCULAR at 11:04

## 2024-04-01 NOTE — PROGRESS NOTES
Assessment: 83 y.o. male with Left knee osteoarthritis     We discussed the findings on xray and clinical exam as well as the natural history of arthritis.     Plan:   - Injection of the left knee  performed, please see procedure note for more details.  Prior to the injection risks and benefits of corticosteroid injection were discussed with the patient including pain, infection, bleeding, skin color changes, swelling, steroid flare. We discussed that over time injections can result in chondral damage, acceleration of arthritis formation, damage to tendons and damage to joints.  The patient consented for the procedure.  Post-injection instructions were given to the patient in writing.  - Return to clinic in 2 weeks. Return sooner if symptoms worsen or fail to improve.  If triggering of  L hand not improved will consider release. Can inject R hand if wound healed  - Mupirocin sent for R hand skin tear - local wound care. Contact for s/s of infection       All questions were answered in detail. The patient is in full agreement with the treatment plan and will proceed accordingly.      Chief Complaint   Patient presents with    Left Knee - Pain       This patient was seen in consultation at the request of No ref. provider found     HPI (04/01/2024): Percy Ramirez is a 83 y.o. male who presents today complaining of left knee pain  Duration of symptoms:  several years  Trauma or new activity: no  Pain is constant  Aggravating factors: weight bearing activity   Relieving factors: rest  Associated symptoms:  swelling.    Prior treatment:  tylenol  without improvement in pain.   Has done injections in the past - not sure if they were steroid or visco  Pain does interfere with activities of daily living .    Glucose elevated after injection, has normalize\d    This is the extent of the patient's complaints at this time.       Review of patient's allergies indicates:   Allergen Reactions    Aricept odt [donepezil] Diarrhea  "and Nausea And Vomiting    Codeine Nausea Only     weak    Ranexa [ranolazine]          Current Outpatient Medications:     acetaminophen (TYLENOL) 325 MG tablet, Take 2 tablets (650 mg total) by mouth every 6 (six) hours as needed., Disp: 13 tablet, Rfl: 0    albuterol (PROVENTIL/VENTOLIN HFA) 90 mcg/actuation inhaler, INHALE 2 PUFFS BY MOUTH EVERY 6 HOURS AS NEEDED FOR WHEEZING OR  SHORTNESS  OF  BREATH, Disp: 54 g, Rfl: 3    atorvastatin (LIPITOR) 40 MG tablet, Take 40 mg by mouth once daily., Disp: , Rfl:     BD INSULIN PEN NEEDLE UF SHORT 31 gauge x 5/16" Ndle, , Disp: , Rfl:     BD INSULIN SYRINGE ULTRA-FINE 1/2 mL 31 gauge x 15/64" Syrg, , Disp: , Rfl:     blood sugar diagnostic Strp, To check BG 3 times daily, to use with insurance preferred meter, Disp: 200 strip, Rfl: 11    carvedilol (COREG) 25 MG tablet, Take 1 tablet (25 mg total) by mouth 2 (two) times daily., Disp: 180 tablet, Rfl: 0    ceramides 1,3,6-II (CERAVE DAILY MOISTURIZING) Lotn, Apply twice daily to skin, Disp: 355 mL, Rfl: 1    cinnamon bark 500 mg capsule, Take 1,000 mg by mouth once daily.  , Disp: , Rfl:     clopidogreL (PLAVIX) 75 mg tablet, Take 75 mg by mouth., Disp: , Rfl:     COMIRNATY 2023-24, 12Y UP,,PF, 30 mcg/0.3 mL injection, , Disp: , Rfl:     diclofenac sodium (VOLTAREN) 1 % Gel, Apply 2 g topically 2 (two) times daily., Disp: 100 g, Rfl: 0    diclofenac sodium (VOLTAREN) 1 % Gel, Apply 2 g topically 3 (three) times daily., Disp: 50 g, Rfl: 0    DULoxetine (CYMBALTA) 60 MG capsule, Take 1 capsule (60 mg total) by mouth once daily., Disp: 90 capsule, Rfl: 3    ENTRESTO  mg per tablet, Take 1 tablet by mouth 2 (two) times daily., Disp: , Rfl:     FLUAD QUAD 2023-24,65Y UP,,PF, 60 mcg (15 mcg x 4)/0.5 mL Syrg, , Disp: , Rfl:     fluticasone propionate (FLONASE) 50 mcg/actuation nasal spray, 1 spray (50 mcg total) by Each Nostril route 2 (two) times daily., Disp: 18.2 mL, Rfl: 3    furosemide (LASIX) 40 MG tablet, Take 40 " "mg by mouth once daily., Disp: , Rfl:     gabapentin (NEURONTIN) 300 MG capsule, Take 4 capsules (1,200 mg total) by mouth 2 (two) times daily. (Patient taking differently: Take 900 mg by mouth 2 (two) times daily.), Disp: 540 capsule, Rfl: 1    glimepiride (AMARYL) 4 MG tablet, Take 4 mg by mouth daily with breakfast. , Disp: , Rfl:     HYDROcodone-acetaminophen (NORCO) 7.5-325 mg per tablet, Take 1 tablet by mouth every 8 (eight) hours as needed for Pain., Disp: 90 tablet, Rfl: 0    [START ON 4/17/2024] HYDROcodone-acetaminophen (NORCO) 7.5-325 mg per tablet, Take 1 tablet by mouth every 8 (eight) hours as needed for Pain., Disp: 90 tablet, Rfl: 0    [START ON 5/17/2024] HYDROcodone-acetaminophen (NORCO) 7.5-325 mg per tablet, Take 1 tablet by mouth every 8 (eight) hours as needed for Pain., Disp: 90 tablet, Rfl: 0    insulin NPH-insulin regular, 70/30, (NOVOLIN 70/30) 100 unit/mL (70-30) injection, Inject into the skin. Inject 10 units at breakfast and inject 10 units at night, Disp: , Rfl:     insulin syringe-needle U-100 0.3 mL 31 gauge x 15/64" Syrg, USE TO INJECT INSULIN THREE TIMES DAILY, Disp: , Rfl:     insulin syringe-needle U-100 1/2 mL 31 x 5/16" Syrg, , Disp: , Rfl:     ipratropium (ATROVENT) 42 mcg (0.06 %) nasal spray, 2 sprays by Nasal route every 6 (six) hours as needed for Rhinitis (use as needed for runny nose and also use 15 minutes prior to meal). Clean nose with saline prior to use, Disp: 15 mL, Rfl: 3    isosorbide mononitrate (IMDUR) 30 MG 24 hr tablet, Take 30 mg by mouth once daily., Disp: , Rfl:     lancets Misc, To check BG 3 times daily, to use with insurance preferred meter, Disp: 200 each, Rfl: 11    LIDOcaine (LIDODERM) 5 %, Place 1 patch onto the skin once daily. Remove & Discard patch within 12 hours or as directed by MD, Disp: 5 patch, Rfl: 1    meclizine (ANTIVERT) 12.5 mg tablet, Take 1 tablet (12.5 mg total) by mouth 3 (three) times daily as needed for Dizziness., Disp: 90 " tablet, Rfl: 0    metFORMIN (GLUCOPHAGE) 500 MG tablet, Take 500 mg by mouth 2 (two) times daily., Disp: , Rfl:     methocarbamoL (ROBAXIN) 500 MG Tab, TAKE 1 TABLET BY MOUTH 4 TIMES DAILY FOR 10 DAYS, Disp: 60 tablet, Rfl: 0    nitroGLYCERIN (NITROSTAT) 0.4 MG SL tablet, DISSOLVE 1 TABLET UNDER THE TONGUE EVERY 5 MINUTES AS  NEEDED FOR CHEST PAIN. MAX  OF 3 TABLETS IN 15 MINUTES. CALL 911 IF PAIN PERSISTS., Disp: , Rfl:     pravastatin (PRAVACHOL) 20 MG tablet, Take 1 tablet (20 mg total) by mouth once daily., Disp: 90 tablet, Rfl: 1    spironolactone (ALDACTONE) 25 MG tablet, Take 25 mg by mouth., Disp: , Rfl:     tiotropium-olodateroL (STIOLTO RESPIMAT) 2.5-2.5 mcg/actuation Mist, Inhale 2 puffs into the lungs once daily. Controller, Disp: 1 g, Rfl: 11    triamcinolone acetonide 0.1% (KENALOG) 0.1 % cream, SMARTSI Application Topical 2-3 Times Daily, Disp: , Rfl:     valsartan (DIOVAN) 80 MG tablet, Take 80 mg by mouth., Disp: , Rfl:     VIT C/VIT E AC/LUT/COPPER/ZINC (PRESERVISION LUTEIN ORAL), Take by mouth., Disp: , Rfl:     warfarin (COUMADIN) 5 MG tablet, Take 2 tabs by mouth on Tuesday,Thursday, Saturday and Sundays then 1.5 on all other days., Disp: , Rfl:     blood-glucose meter kit, To check BG 3 times daily, to use with insurance preferred meter, Disp: 1 each, Rfl: 0    Past Medical History:   Diagnosis Date    Allergy     Arthritis     CAD, multiple vessel     DR Melissa    Cataract     Depression     History of colon polyps     Hyperlipidemia     Hypertension     Macular degeneration     Dr Razo for injection, Jennifer for eye    S/P CABG x 2     Type 2 diabetes mellitus with circulatory disorder     Dr. Aquino       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    Coronary artery disease    S/P CABG x 2    Macular degeneration     Obstructive sleep apnea treated with BiPAP    Anxiety state, unspecified    Insulin long-term use    Primary osteoarthritis of both knees    Chronic low back pain    DJD (degenerative joint disease), lumbar    Poorly controlled type 2 diabetes mellitus with neuropathy    Bilateral carotid artery disease    Hyperlipidemia LDL goal <100    Type 2 diabetes, with peripheral circulatory disorder not at goal    COPD with chronic bronchitis and emphysema    Atherosclerosis of abdominal aorta    Overweight (BMI 25.0-29.9)    Persistent atrial fibrillation    Chronic stable angina    Senile purpura    Goals of care, counseling/discussion    Osteoarthritis of carpometacarpal (CMC) joint of left thumb    MCI (mild cognitive impairment)    Dizziness and giddiness    Pulmonary nodule    Dyspnea on exertion    Chronic combined systolic and diastolic heart failure    Walker as ambulation aid    ILD (interstitial lung disease)    History of cardioversion    Erectile dysfunction    Secondary hyperparathyroidism of renal origin    Noncompliance with medication regimen    Exudative age-related macular degeneration, right eye, with active choroidal neovascularization    History of stroke    Chronic tension-type headache, intractable    Spinal stenosis of lumbar region with neurogenic claudication    Lumbar radiculopathy    Lumbar spondylosis    DDD (degenerative disc disease), lumbar    Cerebral atherosclerosis       Past Surgical History:   Procedure Laterality Date    broken elbow      left    COLONOSCOPY N/A 10/4/2017    Procedure: COLONOSCOPY;  Surgeon: Gabriel Leung MD;  Location: [facility] END[facility];  Service: Endoscopy;  Laterality: N/A;    EYE SURGERY      cataract    heart bypass      2004    HERNIA REPAIR      right    ROTATOR CUFF REPAIR      right  2004       Social History     Tobacco Use    Smoking status: Former     Current packs/day: 0.00     Average packs/day: 3.0 packs/day for 45.0 years (135.0 ttl pk-yrs)     Types:  Cigarettes     Start date: 1959     Quit date: 2004     Years since quittin.9    Smokeless tobacco: Former   Substance Use Topics    Alcohol use: Yes     Comment: was heavy drinker 35 years ago, rare use now    Drug use: No       Family History   Problem Relation Age of Onset    Arthritis Mother     Diabetes Mother     Stroke Mother     Heart attack Father     Cancer Brother     Throat cancer Brother     Diabetes Maternal Aunt     Diabetes Maternal Uncle     Heart disease Maternal Uncle     Diabetes Paternal Aunt     Heart disease Paternal Aunt     Heart disease Paternal Uncle     Heart disease Maternal Grandmother     Cancer Maternal Grandfather        Physical Exam:   Vitals:    24 1203   PainSc: 10-Worst pain ever   PainLoc: Knee       General: Patient is alert, awake and oriented to time, place and person. Mood and affect are appropriate.  Patient does not appear to be in any distress, denies any constitutional symptoms and appears stated age.   HEENT: Pupils are equal and round, sclera are not injected. External examination of ears and nose reveals no abnormalities. Cranial nerves II-X are grossly intact  Skin: no rashes, abrasions or open wounds on the affected extremity   Resp: No respiratory distress or audible wheezing   CV: 2+  pulses, all extremities warm and well perfused     Left knee(s)  Localizes pain over MJL  no swelling , effusion, or erythema   Active ROM: 0 - 90  Passive ROM: 0 - 90  no varus/valgus deformity   Tender to palpation over medial joint line , lateral joint line , and popliteal fossa  good  quadricep muscle tone and bulk; no atrophy compared to contralateral extremity   normal (0 - 2 mm) Anterior drawer   normal (0 - 2 mm) posterior drawer   negative valgus instability   negative varus instability   ltsi s/s/sp/dp/t   + ehl/fhl/ta/gs  2 + DP      Imaging:  3 views left knee:  severe tricompartmental OA with subchondral sclerosis, joint space narrowing, osteophyte  "formation and loose bodies.      I personally reviewed and interpreted the patient's imaging obtained today in clinic     This note was created by combination of typed  and M-Modal dictation. Transcription and phonetic errors may be present.  If there are any questions, please contact me.      Current Outpatient Medications:     acetaminophen (TYLENOL) 325 MG tablet, Take 2 tablets (650 mg total) by mouth every 6 (six) hours as needed., Disp: 13 tablet, Rfl: 0    albuterol (PROVENTIL/VENTOLIN HFA) 90 mcg/actuation inhaler, INHALE 2 PUFFS BY MOUTH EVERY 6 HOURS AS NEEDED FOR WHEEZING OR  SHORTNESS  OF  BREATH, Disp: 54 g, Rfl: 3    atorvastatin (LIPITOR) 40 MG tablet, Take 40 mg by mouth once daily., Disp: , Rfl:     BD INSULIN PEN NEEDLE UF SHORT 31 gauge x 5/16" Ndle, , Disp: , Rfl:     BD INSULIN SYRINGE ULTRA-FINE 1/2 mL 31 gauge x 15/64" Syrg, , Disp: , Rfl:     blood sugar diagnostic Strp, To check BG 3 times daily, to use with insurance preferred meter, Disp: 200 strip, Rfl: 11    carvedilol (COREG) 25 MG tablet, Take 1 tablet (25 mg total) by mouth 2 (two) times daily., Disp: 180 tablet, Rfl: 0    ceramides 1,3,6-II (CERAVE DAILY MOISTURIZING) Lotn, Apply twice daily to skin, Disp: 355 mL, Rfl: 1    cinnamon bark 500 mg capsule, Take 1,000 mg by mouth once daily.  , Disp: , Rfl:     clopidogreL (PLAVIX) 75 mg tablet, Take 75 mg by mouth., Disp: , Rfl:     COMIRNATY 2023-24, 12Y UP,,PF, 30 mcg/0.3 mL injection, , Disp: , Rfl:     diclofenac sodium (VOLTAREN) 1 % Gel, Apply 2 g topically 2 (two) times daily., Disp: 100 g, Rfl: 0    diclofenac sodium (VOLTAREN) 1 % Gel, Apply 2 g topically 3 (three) times daily., Disp: 50 g, Rfl: 0    DULoxetine (CYMBALTA) 60 MG capsule, Take 1 capsule (60 mg total) by mouth once daily., Disp: 90 capsule, Rfl: 3    ENTRESTO  mg per tablet, Take 1 tablet by mouth 2 (two) times daily., Disp: , Rfl:     FLUAD QUAD 2023-24,65Y UP,,PF, 60 mcg (15 mcg x 4)/0.5 mL Syrg, " ", Disp: , Rfl:     fluticasone propionate (FLONASE) 50 mcg/actuation nasal spray, 1 spray (50 mcg total) by Each Nostril route 2 (two) times daily., Disp: 18.2 mL, Rfl: 3    furosemide (LASIX) 40 MG tablet, Take 40 mg by mouth once daily., Disp: , Rfl:     gabapentin (NEURONTIN) 300 MG capsule, Take 4 capsules (1,200 mg total) by mouth 2 (two) times daily. (Patient taking differently: Take 900 mg by mouth 2 (two) times daily.), Disp: 540 capsule, Rfl: 1    glimepiride (AMARYL) 4 MG tablet, Take 4 mg by mouth daily with breakfast. , Disp: , Rfl:     HYDROcodone-acetaminophen (NORCO) 7.5-325 mg per tablet, Take 1 tablet by mouth every 8 (eight) hours as needed for Pain., Disp: 90 tablet, Rfl: 0    [START ON 4/17/2024] HYDROcodone-acetaminophen (NORCO) 7.5-325 mg per tablet, Take 1 tablet by mouth every 8 (eight) hours as needed for Pain., Disp: 90 tablet, Rfl: 0    [START ON 5/17/2024] HYDROcodone-acetaminophen (NORCO) 7.5-325 mg per tablet, Take 1 tablet by mouth every 8 (eight) hours as needed for Pain., Disp: 90 tablet, Rfl: 0    insulin NPH-insulin regular, 70/30, (NOVOLIN 70/30) 100 unit/mL (70-30) injection, Inject into the skin. Inject 10 units at breakfast and inject 10 units at night, Disp: , Rfl:     insulin syringe-needle U-100 0.3 mL 31 gauge x 15/64" Syrg, USE TO INJECT INSULIN THREE TIMES DAILY, Disp: , Rfl:     insulin syringe-needle U-100 1/2 mL 31 x 5/16" Syrg, , Disp: , Rfl:     ipratropium (ATROVENT) 42 mcg (0.06 %) nasal spray, 2 sprays by Nasal route every 6 (six) hours as needed for Rhinitis (use as needed for runny nose and also use 15 minutes prior to meal). Clean nose with saline prior to use, Disp: 15 mL, Rfl: 3    isosorbide mononitrate (IMDUR) 30 MG 24 hr tablet, Take 30 mg by mouth once daily., Disp: , Rfl:     lancets Misc, To check BG 3 times daily, to use with insurance preferred meter, Disp: 200 each, Rfl: 11    LIDOcaine (LIDODERM) 5 %, Place 1 patch onto the skin once daily. Remove & " Discard patch within 12 hours or as directed by MD, Disp: 5 patch, Rfl: 1    meclizine (ANTIVERT) 12.5 mg tablet, Take 1 tablet (12.5 mg total) by mouth 3 (three) times daily as needed for Dizziness., Disp: 90 tablet, Rfl: 0    metFORMIN (GLUCOPHAGE) 500 MG tablet, Take 500 mg by mouth 2 (two) times daily., Disp: , Rfl:     methocarbamoL (ROBAXIN) 500 MG Tab, TAKE 1 TABLET BY MOUTH 4 TIMES DAILY FOR 10 DAYS, Disp: 60 tablet, Rfl: 0    nitroGLYCERIN (NITROSTAT) 0.4 MG SL tablet, DISSOLVE 1 TABLET UNDER THE TONGUE EVERY 5 MINUTES AS  NEEDED FOR CHEST PAIN. MAX  OF 3 TABLETS IN 15 MINUTES. CALL 911 IF PAIN PERSISTS., Disp: , Rfl:     pravastatin (PRAVACHOL) 20 MG tablet, Take 1 tablet (20 mg total) by mouth once daily., Disp: 90 tablet, Rfl: 1    spironolactone (ALDACTONE) 25 MG tablet, Take 25 mg by mouth., Disp: , Rfl:     tiotropium-olodateroL (STIOLTO RESPIMAT) 2.5-2.5 mcg/actuation Mist, Inhale 2 puffs into the lungs once daily. Controller, Disp: 1 g, Rfl: 11    triamcinolone acetonide 0.1% (KENALOG) 0.1 % cream, SMARTSI Application Topical 2-3 Times Daily, Disp: , Rfl:     valsartan (DIOVAN) 80 MG tablet, Take 80 mg by mouth., Disp: , Rfl:     VIT C/VIT E AC/LUT/COPPER/ZINC (PRESERVISION LUTEIN ORAL), Take by mouth., Disp: , Rfl:     warfarin (COUMADIN) 5 MG tablet, Take 2 tabs by mouth on Tuesday,Thursday, Saturday and Sundays then 1.5 on all other days., Disp: , Rfl:     blood-glucose meter kit, To check BG 3 times daily, to use with insurance preferred meter, Disp: 1 each, Rfl: 0

## 2024-04-01 NOTE — PROCEDURES
Large Joint Aspiration/Injection: L knee    Date/Time: 4/1/2024 11:30 AM    Performed by: Elda Campbell MD  Authorized by: Elda Campbell MD    Consent Done?:  Yes (Verbal)  Indications:  Arthritis  Timeout: prior to procedure the correct patient, procedure, and site was verified    Prep: patient was prepped and draped in usual sterile fashion      Local anesthesia used?: Yes    Local anesthetic:  Topical anesthetic    Details:  Needle Size:  22 G  Approach:  Anteromedial  Location:  Knee  Site:  L knee  Medications:  40 mg triamcinolone acetonide 40 mg/mL  Patient tolerance:  Patient tolerated the procedure well with no immediate complications

## 2024-04-15 ENCOUNTER — TELEPHONE (OUTPATIENT)
Dept: ORTHOPEDICS | Facility: CLINIC | Age: 84
End: 2024-04-15
Payer: MEDICARE

## 2024-04-15 ENCOUNTER — OFFICE VISIT (OUTPATIENT)
Dept: ORTHOPEDICS | Facility: CLINIC | Age: 84
End: 2024-04-15
Payer: MEDICARE

## 2024-04-15 DIAGNOSIS — M17.12 PRIMARY OSTEOARTHRITIS OF LEFT KNEE: Primary | ICD-10-CM

## 2024-04-15 DIAGNOSIS — M65.30 TRIGGER FINGER, UNSPECIFIED FINGER, UNSPECIFIED LATERALITY: ICD-10-CM

## 2024-04-15 PROCEDURE — 1160F RVW MEDS BY RX/DR IN RCRD: CPT | Mod: CPTII,S$GLB,, | Performed by: ORTHOPAEDIC SURGERY

## 2024-04-15 PROCEDURE — 1159F MED LIST DOCD IN RCRD: CPT | Mod: CPTII,S$GLB,, | Performed by: ORTHOPAEDIC SURGERY

## 2024-04-15 PROCEDURE — 99999 PR PBB SHADOW E&M-EST. PATIENT-LVL III: CPT | Mod: PBBFAC,,, | Performed by: ORTHOPAEDIC SURGERY

## 2024-04-15 PROCEDURE — 1101F PT FALLS ASSESS-DOCD LE1/YR: CPT | Mod: CPTII,S$GLB,, | Performed by: ORTHOPAEDIC SURGERY

## 2024-04-15 PROCEDURE — 3288F FALL RISK ASSESSMENT DOCD: CPT | Mod: CPTII,S$GLB,, | Performed by: ORTHOPAEDIC SURGERY

## 2024-04-15 PROCEDURE — 1157F ADVNC CARE PLAN IN RCRD: CPT | Mod: CPTII,S$GLB,, | Performed by: ORTHOPAEDIC SURGERY

## 2024-04-15 PROCEDURE — 1125F AMNT PAIN NOTED PAIN PRSNT: CPT | Mod: CPTII,S$GLB,, | Performed by: ORTHOPAEDIC SURGERY

## 2024-04-15 PROCEDURE — 99213 OFFICE O/P EST LOW 20 MIN: CPT | Mod: S$GLB,,, | Performed by: ORTHOPAEDIC SURGERY

## 2024-04-15 NOTE — PROGRESS NOTES
"Assessment: 83 y.o. male with Left knee osteoarthritis     We discussed the findings on xray and clinical exam as well as the natural history of arthritis.     Plan:   - Durolane pre auth submitted   - Still having triggering and pain in LLF - discussed release, he will think about it   - Volatren gel   - Return to clinic in 3 weeks    All questions were answered in detail. The patient is in full agreement with the treatment plan and will proceed accordingly.      Chief Complaint   Patient presents with    Left Knee - Pain        HPI (4/1/24): Percy Ramirez is a 83 y.o. male who presents today complaining of left knee pain  Duration of symptoms:  several years  Trauma or new activity: no  Pain is constant  Aggravating factors: weight bearing activity   Relieving factors: rest  Associated symptoms:  swelling.    Prior treatment:  tylenol  without improvement in pain.   Has done injections in the past - not sure if they were steroid or visco  Pain does interfere with activities of daily living .    4/15/24  Left knee and LLF trigger finger not improved with injection    Glucose elevated after injection, has normalized    This is the extent of the patient's complaints at this time.       Review of patient's allergies indicates:   Allergen Reactions    Aricept odt [donepezil] Diarrhea and Nausea And Vomiting    Codeine Nausea Only     weak    Ranexa [ranolazine]          Current Outpatient Medications:     acetaminophen (TYLENOL) 325 MG tablet, Take 2 tablets (650 mg total) by mouth every 6 (six) hours as needed., Disp: 13 tablet, Rfl: 0    albuterol (PROVENTIL/VENTOLIN HFA) 90 mcg/actuation inhaler, INHALE 2 PUFFS BY MOUTH EVERY 6 HOURS AS NEEDED FOR WHEEZING OR  SHORTNESS  OF  BREATH, Disp: 54 g, Rfl: 3    atorvastatin (LIPITOR) 40 MG tablet, Take 40 mg by mouth once daily., Disp: , Rfl:     BD INSULIN PEN NEEDLE UF SHORT 31 gauge x 5/16" Ndle, , Disp: , Rfl:     BD INSULIN SYRINGE ULTRA-FINE 1/2 mL 31 gauge x " "15/64" Syrg, , Disp: , Rfl:     blood sugar diagnostic Strp, To check BG 3 times daily, to use with insurance preferred meter, Disp: 200 strip, Rfl: 11    carvedilol (COREG) 25 MG tablet, Take 1 tablet (25 mg total) by mouth 2 (two) times daily., Disp: 180 tablet, Rfl: 0    ceramides 1,3,6-II (CERAVE DAILY MOISTURIZING) Lotn, Apply twice daily to skin, Disp: 355 mL, Rfl: 1    cinnamon bark 500 mg capsule, Take 1,000 mg by mouth once daily.  , Disp: , Rfl:     clopidogreL (PLAVIX) 75 mg tablet, Take 75 mg by mouth., Disp: , Rfl:     COMIRNATY 2023-24, 12Y UP,,PF, 30 mcg/0.3 mL injection, , Disp: , Rfl:     diclofenac sodium (VOLTAREN) 1 % Gel, Apply 2 g topically 2 (two) times daily., Disp: 100 g, Rfl: 0    diclofenac sodium (VOLTAREN) 1 % Gel, Apply 2 g topically 3 (three) times daily., Disp: 50 g, Rfl: 0    DULoxetine (CYMBALTA) 60 MG capsule, Take 1 capsule (60 mg total) by mouth once daily., Disp: 90 capsule, Rfl: 3    ENTRESTO  mg per tablet, Take 1 tablet by mouth 2 (two) times daily., Disp: , Rfl:     FLUAD QUAD 2023-24,65Y UP,,PF, 60 mcg (15 mcg x 4)/0.5 mL Syrg, , Disp: , Rfl:     fluticasone propionate (FLONASE) 50 mcg/actuation nasal spray, 1 spray (50 mcg total) by Each Nostril route 2 (two) times daily., Disp: 18.2 mL, Rfl: 3    furosemide (LASIX) 40 MG tablet, Take 40 mg by mouth once daily., Disp: , Rfl:     gabapentin (NEURONTIN) 300 MG capsule, Take 4 capsules (1,200 mg total) by mouth 2 (two) times daily. (Patient taking differently: Take 900 mg by mouth 2 (two) times daily.), Disp: 540 capsule, Rfl: 1    glimepiride (AMARYL) 4 MG tablet, Take 4 mg by mouth daily with breakfast. , Disp: , Rfl:     HYDROcodone-acetaminophen (NORCO) 7.5-325 mg per tablet, Take 1 tablet by mouth every 8 (eight) hours as needed for Pain., Disp: 90 tablet, Rfl: 0    [START ON 4/17/2024] HYDROcodone-acetaminophen (NORCO) 7.5-325 mg per tablet, Take 1 tablet by mouth every 8 (eight) hours as needed for Pain., Disp: " "90 tablet, Rfl: 0    [START ON 5/17/2024] HYDROcodone-acetaminophen (NORCO) 7.5-325 mg per tablet, Take 1 tablet by mouth every 8 (eight) hours as needed for Pain., Disp: 90 tablet, Rfl: 0    insulin NPH-insulin regular, 70/30, (NOVOLIN 70/30) 100 unit/mL (70-30) injection, Inject into the skin. Inject 10 units at breakfast and inject 10 units at night, Disp: , Rfl:     insulin syringe-needle U-100 0.3 mL 31 gauge x 15/64" Syrg, USE TO INJECT INSULIN THREE TIMES DAILY, Disp: , Rfl:     insulin syringe-needle U-100 1/2 mL 31 x 5/16" Syrg, , Disp: , Rfl:     ipratropium (ATROVENT) 42 mcg (0.06 %) nasal spray, 2 sprays by Nasal route every 6 (six) hours as needed for Rhinitis (use as needed for runny nose and also use 15 minutes prior to meal). Clean nose with saline prior to use, Disp: 15 mL, Rfl: 3    isosorbide mononitrate (IMDUR) 30 MG 24 hr tablet, Take 30 mg by mouth once daily., Disp: , Rfl:     lancets Misc, To check BG 3 times daily, to use with insurance preferred meter, Disp: 200 each, Rfl: 11    LIDOcaine (LIDODERM) 5 %, Place 1 patch onto the skin once daily. Remove & Discard patch within 12 hours or as directed by MD, Disp: 5 patch, Rfl: 1    meclizine (ANTIVERT) 12.5 mg tablet, Take 1 tablet (12.5 mg total) by mouth 3 (three) times daily as needed for Dizziness., Disp: 90 tablet, Rfl: 0    metFORMIN (GLUCOPHAGE) 500 MG tablet, Take 500 mg by mouth 2 (two) times daily., Disp: , Rfl:     methocarbamoL (ROBAXIN) 500 MG Tab, TAKE 1 TABLET BY MOUTH 4 TIMES DAILY FOR 10 DAYS, Disp: 60 tablet, Rfl: 0    mupirocin (BACTROBAN) 2 % ointment, Apply topically 3 (three) times daily., Disp: 1 g, Rfl: 1    nitroGLYCERIN (NITROSTAT) 0.4 MG SL tablet, DISSOLVE 1 TABLET UNDER THE TONGUE EVERY 5 MINUTES AS  NEEDED FOR CHEST PAIN. MAX  OF 3 TABLETS IN 15 MINUTES. CALL 911 IF PAIN PERSISTS., Disp: , Rfl:     pravastatin (PRAVACHOL) 20 MG tablet, Take 1 tablet (20 mg total) by mouth once daily., Disp: 90 tablet, Rfl: 1    " spironolactone (ALDACTONE) 25 MG tablet, Take 25 mg by mouth., Disp: , Rfl:     tiotropium-olodateroL (STIOLTO RESPIMAT) 2.5-2.5 mcg/actuation Mist, Inhale 2 puffs into the lungs once daily. Controller, Disp: 1 g, Rfl: 11    triamcinolone acetonide 0.1% (KENALOG) 0.1 % cream, SMARTSI Application Topical 2-3 Times Daily, Disp: , Rfl:     valsartan (DIOVAN) 80 MG tablet, Take 80 mg by mouth., Disp: , Rfl:     VIT C/VIT E AC/LUT/COPPER/ZINC (PRESERVISION LUTEIN ORAL), Take by mouth., Disp: , Rfl:     warfarin (COUMADIN) 5 MG tablet, Take 2 tabs by mouth on Tuesday,Thursday, Saturday and Sundays then 1.5 on all other days., Disp: , Rfl:     blood-glucose meter kit, To check BG 3 times daily, to use with insurance preferred meter, Disp: 1 each, Rfl: 0    Past Medical History:   Diagnosis Date    Allergy     Arthritis     CAD, multiple vessel     DR Melissa    Cataract     Depression     History of colon polyps     Hyperlipidemia     Hypertension     Macular degeneration     Dr Razo for injection, Jennifer for eye    S/P CABG x 2     Type 2 diabetes mellitus with circulatory disorder     Dr. Aquino       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    Coronary artery disease    S/P CABG x 2    Macular degeneration    Obstructive sleep apnea treated with BiPAP    Anxiety state, unspecified    Insulin long-term use    Primary osteoarthritis of both knees    Chronic low back pain    DJD (degenerative joint disease), lumbar    Poorly controlled type 2 diabetes mellitus with neuropathy    Bilateral carotid artery disease    Hyperlipidemia LDL goal <100    Type 2 diabetes, with peripheral circulatory disorder not at goal    COPD with chronic bronchitis and emphysema    Atherosclerosis of abdominal aorta    Overweight (BMI 25.0-29.9)    Persistent atrial  fibrillation    Chronic stable angina    Senile purpura    Goals of care, counseling/discussion    Osteoarthritis of carpometacarpal (CMC) joint of left thumb    MCI (mild cognitive impairment)    Dizziness and giddiness    Pulmonary nodule    Dyspnea on exertion    Chronic combined systolic and diastolic heart failure    Walker as ambulation aid    ILD (interstitial lung disease)    History of cardioversion    Erectile dysfunction    Secondary hyperparathyroidism of renal origin    Noncompliance with medication regimen    Exudative age-related macular degeneration, right eye, with active choroidal neovascularization    History of stroke    Chronic tension-type headache, intractable    Spinal stenosis of lumbar region with neurogenic claudication    Lumbar radiculopathy    Lumbar spondylosis    DDD (degenerative disc disease), lumbar    Cerebral atherosclerosis       Past Surgical History:   Procedure Laterality Date    broken elbow      left    COLONOSCOPY N/A 10/4/2017    Procedure: COLONOSCOPY;  Surgeon: Gabriel Leung MD;  Location: [facility];  Service: Endoscopy;  Laterality: N/A;    EYE SURGERY      cataract    heart bypass          HERNIA REPAIR      right    ROTATOR CUFF REPAIR      right         Social History     Tobacco Use    Smoking status: Former     Current packs/day: 0.00     Average packs/day: 3.0 packs/day for 45.0 years (135.0 ttl pk-yrs)     Types: Cigarettes     Start date: 1959     Quit date: 2004     Years since quittin.0    Smokeless tobacco: Former   Substance Use Topics    Alcohol use: Yes     Comment: was heavy drinker 35 years ago, rare use now    Drug use: No       Family History   Problem Relation Name Age of Onset    Arthritis Mother      Diabetes Mother      Stroke Mother      Heart attack Father      Cancer Brother      Throat cancer Brother      Diabetes Maternal Aunt      Diabetes Maternal Uncle      Heart disease Maternal Uncle      Diabetes Paternal  Aunt      Heart disease Paternal Aunt      Heart disease Paternal Uncle      Heart disease Maternal Grandmother      Cancer Maternal Grandfather         Physical Exam:   Vitals:    04/15/24 1118   PainSc:   9   PainLoc: Knee       General: Patient is alert, awake and oriented to time, place and person. Mood and affect are appropriate.  Patient does not appear to be in any distress, denies any constitutional symptoms and appears stated age.   HEENT: Pupils are equal and round, sclera are not injected. External examination of ears and nose reveals no abnormalities. Cranial nerves II-X are grossly intact  Skin: no rashes, abrasions or open wounds on the affected extremity   Resp: No respiratory distress or audible wheezing   CV: 2+  pulses, all extremities warm and well perfused     Left knee(s)  Localizes pain over MJL  no swelling , effusion, or erythema   Active ROM: 0 - 90  Passive ROM: 0 - 90  no varus/valgus deformity   Tender to palpation over medial joint line , lateral joint line , and popliteal fossa  good  quadricep muscle tone and bulk; no atrophy compared to contralateral extremity   normal (0 - 2 mm) Anterior drawer   normal (0 - 2 mm) posterior drawer   negative valgus instability   negative varus instability   ltsi s/s/sp/dp/t   + ehl/fhl/ta/gs  2 + DP      Imaging:  3 views left knee:  severe tricompartmental OA with subchondral sclerosis, joint space narrowing, osteophyte formation and loose bodies.      I personally reviewed and interpreted the patient's imaging obtained today in clinic     This note was created by combination of typed  and M-Modal dictation. Transcription and phonetic errors may be present.  If there are any questions, please contact me.      Current Outpatient Medications:     acetaminophen (TYLENOL) 325 MG tablet, Take 2 tablets (650 mg total) by mouth every 6 (six) hours as needed., Disp: 13 tablet, Rfl: 0    albuterol (PROVENTIL/VENTOLIN HFA) 90 mcg/actuation inhaler,  "INHALE 2 PUFFS BY MOUTH EVERY 6 HOURS AS NEEDED FOR WHEEZING OR  SHORTNESS  OF  BREATH, Disp: 54 g, Rfl: 3    atorvastatin (LIPITOR) 40 MG tablet, Take 40 mg by mouth once daily., Disp: , Rfl:     BD INSULIN PEN NEEDLE UF SHORT 31 gauge x 5/16" Ndle, , Disp: , Rfl:     BD INSULIN SYRINGE ULTRA-FINE 1/2 mL 31 gauge x 15/64" Syrg, , Disp: , Rfl:     blood sugar diagnostic Strp, To check BG 3 times daily, to use with insurance preferred meter, Disp: 200 strip, Rfl: 11    carvedilol (COREG) 25 MG tablet, Take 1 tablet (25 mg total) by mouth 2 (two) times daily., Disp: 180 tablet, Rfl: 0    ceramides 1,3,6-II (CERAVE DAILY MOISTURIZING) Lotn, Apply twice daily to skin, Disp: 355 mL, Rfl: 1    cinnamon bark 500 mg capsule, Take 1,000 mg by mouth once daily.  , Disp: , Rfl:     clopidogreL (PLAVIX) 75 mg tablet, Take 75 mg by mouth., Disp: , Rfl:     COMIRNATY 2023-24, 12Y UP,,PF, 30 mcg/0.3 mL injection, , Disp: , Rfl:     diclofenac sodium (VOLTAREN) 1 % Gel, Apply 2 g topically 2 (two) times daily., Disp: 100 g, Rfl: 0    diclofenac sodium (VOLTAREN) 1 % Gel, Apply 2 g topically 3 (three) times daily., Disp: 50 g, Rfl: 0    DULoxetine (CYMBALTA) 60 MG capsule, Take 1 capsule (60 mg total) by mouth once daily., Disp: 90 capsule, Rfl: 3    ENTRESTO  mg per tablet, Take 1 tablet by mouth 2 (two) times daily., Disp: , Rfl:     FLUAD QUAD 2023-24,65Y UP,,PF, 60 mcg (15 mcg x 4)/0.5 mL Syrg, , Disp: , Rfl:     fluticasone propionate (FLONASE) 50 mcg/actuation nasal spray, 1 spray (50 mcg total) by Each Nostril route 2 (two) times daily., Disp: 18.2 mL, Rfl: 3    furosemide (LASIX) 40 MG tablet, Take 40 mg by mouth once daily., Disp: , Rfl:     gabapentin (NEURONTIN) 300 MG capsule, Take 4 capsules (1,200 mg total) by mouth 2 (two) times daily. (Patient taking differently: Take 900 mg by mouth 2 (two) times daily.), Disp: 540 capsule, Rfl: 1    glimepiride (AMARYL) 4 MG tablet, Take 4 mg by mouth daily with breakfast. , " "Disp: , Rfl:     HYDROcodone-acetaminophen (NORCO) 7.5-325 mg per tablet, Take 1 tablet by mouth every 8 (eight) hours as needed for Pain., Disp: 90 tablet, Rfl: 0    [START ON 4/17/2024] HYDROcodone-acetaminophen (NORCO) 7.5-325 mg per tablet, Take 1 tablet by mouth every 8 (eight) hours as needed for Pain., Disp: 90 tablet, Rfl: 0    [START ON 5/17/2024] HYDROcodone-acetaminophen (NORCO) 7.5-325 mg per tablet, Take 1 tablet by mouth every 8 (eight) hours as needed for Pain., Disp: 90 tablet, Rfl: 0    insulin NPH-insulin regular, 70/30, (NOVOLIN 70/30) 100 unit/mL (70-30) injection, Inject into the skin. Inject 10 units at breakfast and inject 10 units at night, Disp: , Rfl:     insulin syringe-needle U-100 0.3 mL 31 gauge x 15/64" Syrg, USE TO INJECT INSULIN THREE TIMES DAILY, Disp: , Rfl:     insulin syringe-needle U-100 1/2 mL 31 x 5/16" Syrg, , Disp: , Rfl:     ipratropium (ATROVENT) 42 mcg (0.06 %) nasal spray, 2 sprays by Nasal route every 6 (six) hours as needed for Rhinitis (use as needed for runny nose and also use 15 minutes prior to meal). Clean nose with saline prior to use, Disp: 15 mL, Rfl: 3    isosorbide mononitrate (IMDUR) 30 MG 24 hr tablet, Take 30 mg by mouth once daily., Disp: , Rfl:     lancets Misc, To check BG 3 times daily, to use with insurance preferred meter, Disp: 200 each, Rfl: 11    LIDOcaine (LIDODERM) 5 %, Place 1 patch onto the skin once daily. Remove & Discard patch within 12 hours or as directed by MD, Disp: 5 patch, Rfl: 1    meclizine (ANTIVERT) 12.5 mg tablet, Take 1 tablet (12.5 mg total) by mouth 3 (three) times daily as needed for Dizziness., Disp: 90 tablet, Rfl: 0    metFORMIN (GLUCOPHAGE) 500 MG tablet, Take 500 mg by mouth 2 (two) times daily., Disp: , Rfl:     methocarbamoL (ROBAXIN) 500 MG Tab, TAKE 1 TABLET BY MOUTH 4 TIMES DAILY FOR 10 DAYS, Disp: 60 tablet, Rfl: 0    mupirocin (BACTROBAN) 2 % ointment, Apply topically 3 (three) times daily., Disp: 1 g, Rfl: 1    " nitroGLYCERIN (NITROSTAT) 0.4 MG SL tablet, DISSOLVE 1 TABLET UNDER THE TONGUE EVERY 5 MINUTES AS  NEEDED FOR CHEST PAIN. MAX  OF 3 TABLETS IN 15 MINUTES. CALL 911 IF PAIN PERSISTS., Disp: , Rfl:     pravastatin (PRAVACHOL) 20 MG tablet, Take 1 tablet (20 mg total) by mouth once daily., Disp: 90 tablet, Rfl: 1    spironolactone (ALDACTONE) 25 MG tablet, Take 25 mg by mouth., Disp: , Rfl:     tiotropium-olodateroL (STIOLTO RESPIMAT) 2.5-2.5 mcg/actuation Mist, Inhale 2 puffs into the lungs once daily. Controller, Disp: 1 g, Rfl: 11    triamcinolone acetonide 0.1% (KENALOG) 0.1 % cream, SMARTSI Application Topical 2-3 Times Daily, Disp: , Rfl:     valsartan (DIOVAN) 80 MG tablet, Take 80 mg by mouth., Disp: , Rfl:     VIT C/VIT E AC/LUT/COPPER/ZINC (PRESERVISION LUTEIN ORAL), Take by mouth., Disp: , Rfl:     warfarin (COUMADIN) 5 MG tablet, Take 2 tabs by mouth on Tuesday,Thursday, Saturday and Sundays then 1.5 on all other days., Disp: , Rfl:     blood-glucose meter kit, To check BG 3 times daily, to use with insurance preferred meter, Disp: 1 each, Rfl: 0

## 2024-04-18 ENCOUNTER — TELEPHONE (OUTPATIENT)
Dept: FAMILY MEDICINE | Facility: CLINIC | Age: 84
End: 2024-04-18
Payer: MEDICARE

## 2024-04-18 NOTE — TELEPHONE ENCOUNTER
----- Message from Jennifer Doyle sent at 4/18/2024 12:21 PM CDT -----  Type: Patient Call Back         Who called:JAILENE JIMENES [[id number]]         What is the request in detail: Pt would like to speak to office about behavior health and what he is beng charged ,.         Can the clinic reply by [facility]? No         Would the patient rather a call back or a response via [facility]? Call back         Best call back number: Telephone Information:  Mobile          [phone]             Additional Information:         Thank you.

## 2024-04-18 NOTE — TELEPHONE ENCOUNTER
----- Message from Jennifer Doyle sent at 4/17/2024 10:05 AM CDT -----  Type: Patient Call Back         Who called JAILENE JIMENES [[id number]]         What is the request in detail: Pt would like to speak to office about a referral.         Can the clinic reply by [facility]? No         Would the patient rather a call back or a response via [facility]? Call back         Best call back number: Telephone Information:  Mobile          [phone]             Additional Information:         Thank you.

## 2024-05-03 PROBLEM — N18.31 CHRONIC KIDNEY DISEASE, STAGE 3A: Status: ACTIVE | Noted: 2024-05-03

## 2024-05-08 ENCOUNTER — TELEPHONE (OUTPATIENT)
Dept: FAMILY MEDICINE | Facility: CLINIC | Age: 84
End: 2024-05-08
Payer: MEDICARE

## 2024-05-08 NOTE — TELEPHONE ENCOUNTER
----- Message from Adrianne Carver sent at 5/6/2024  3:59 PM CDT -----  Type:  Patient Returning Call         Who Called: self          Who Left Message for Patient: dont know         Does the patient know what this is regarding?: appt         Would the patient rather a call back or a response via [facility]? Call back          Best Call Back Number:[phone]

## 2024-05-15 ENCOUNTER — OFFICE VISIT (OUTPATIENT)
Dept: FAMILY MEDICINE | Facility: CLINIC | Age: 84
End: 2024-05-15
Payer: MEDICARE

## 2024-05-15 VITALS
TEMPERATURE: 98 F | HEART RATE: 74 BPM | OXYGEN SATURATION: 94 % | SYSTOLIC BLOOD PRESSURE: 116 MMHG | DIASTOLIC BLOOD PRESSURE: 72 MMHG | WEIGHT: 198.31 LBS | HEIGHT: 68 IN | BODY MASS INDEX: 30.05 KG/M2

## 2024-05-15 DIAGNOSIS — E11.65 POORLY CONTROLLED TYPE 2 DIABETES MELLITUS WITH NEUROPATHY: ICD-10-CM

## 2024-05-15 DIAGNOSIS — G31.84 MCI (MILD COGNITIVE IMPAIRMENT): ICD-10-CM

## 2024-05-15 DIAGNOSIS — E66.3 OVERWEIGHT (BMI 25.0-29.9): ICD-10-CM

## 2024-05-15 DIAGNOSIS — M17.0 PRIMARY OSTEOARTHRITIS OF BOTH KNEES: ICD-10-CM

## 2024-05-15 DIAGNOSIS — I50.42 CHRONIC COMBINED SYSTOLIC AND DIASTOLIC HEART FAILURE: ICD-10-CM

## 2024-05-15 DIAGNOSIS — G89.29 CHRONIC RIGHT-SIDED LOW BACK PAIN WITH RIGHT-SIDED SCIATICA: ICD-10-CM

## 2024-05-15 DIAGNOSIS — Z99.89 WALKER AS AMBULATION AID: ICD-10-CM

## 2024-05-15 DIAGNOSIS — D69.2 SENILE PURPURA: ICD-10-CM

## 2024-05-15 DIAGNOSIS — J43.9 COPD WITH CHRONIC BRONCHITIS AND EMPHYSEMA: ICD-10-CM

## 2024-05-15 DIAGNOSIS — J84.9 ILD (INTERSTITIAL LUNG DISEASE): ICD-10-CM

## 2024-05-15 DIAGNOSIS — I20.89 CHRONIC STABLE ANGINA: ICD-10-CM

## 2024-05-15 DIAGNOSIS — N25.81 SECONDARY HYPERPARATHYROIDISM OF RENAL ORIGIN: ICD-10-CM

## 2024-05-15 DIAGNOSIS — I70.0 ATHEROSCLEROSIS OF ABDOMINAL AORTA: ICD-10-CM

## 2024-05-15 DIAGNOSIS — G47.33 OBSTRUCTIVE SLEEP APNEA TREATED WITH BIPAP: ICD-10-CM

## 2024-05-15 DIAGNOSIS — J44.89 COPD WITH CHRONIC BRONCHITIS AND EMPHYSEMA: ICD-10-CM

## 2024-05-15 DIAGNOSIS — E11.319 POORLY CONTROLLED TYPE 2 DIABETES MELLITUS WITH RETINOPATHY: ICD-10-CM

## 2024-05-15 DIAGNOSIS — I48.19 PERSISTENT ATRIAL FIBRILLATION: ICD-10-CM

## 2024-05-15 DIAGNOSIS — N18.31 CHRONIC KIDNEY DISEASE, STAGE 3A: ICD-10-CM

## 2024-05-15 DIAGNOSIS — M54.41 CHRONIC RIGHT-SIDED LOW BACK PAIN WITH RIGHT-SIDED SCIATICA: ICD-10-CM

## 2024-05-15 DIAGNOSIS — F33.0 MAJOR DEPRESSIVE DISORDER, RECURRENT EPISODE, MILD: ICD-10-CM

## 2024-05-15 DIAGNOSIS — E11.51 TYPE 2 DIABETES, WITH PERIPHERAL CIRCULATORY DISORDER NOT AT GOAL: ICD-10-CM

## 2024-05-15 DIAGNOSIS — N18.30 BENIGN HYPERTENSION WITH CHRONIC KIDNEY DISEASE, STAGE III: ICD-10-CM

## 2024-05-15 DIAGNOSIS — Z00.00 ROUTINE MEDICAL EXAM: Primary | ICD-10-CM

## 2024-05-15 DIAGNOSIS — Z86.73 HISTORY OF STROKE: ICD-10-CM

## 2024-05-15 DIAGNOSIS — E11.40 POORLY CONTROLLED TYPE 2 DIABETES MELLITUS WITH NEUROPATHY: ICD-10-CM

## 2024-05-15 DIAGNOSIS — Z79.4 INSULIN LONG-TERM USE: ICD-10-CM

## 2024-05-15 DIAGNOSIS — E11.65 POORLY CONTROLLED TYPE 2 DIABETES MELLITUS WITH RETINOPATHY: ICD-10-CM

## 2024-05-15 DIAGNOSIS — E78.5 HYPERLIPIDEMIA LDL GOAL <100: ICD-10-CM

## 2024-05-15 DIAGNOSIS — I12.9 BENIGN HYPERTENSION WITH CHRONIC KIDNEY DISEASE, STAGE III: ICD-10-CM

## 2024-05-15 PROCEDURE — 3078F DIAST BP <80 MM HG: CPT | Mod: CPTII,S$GLB,, | Performed by: INTERNAL MEDICINE

## 2024-05-15 PROCEDURE — 1157F ADVNC CARE PLAN IN RCRD: CPT | Mod: CPTII,S$GLB,, | Performed by: INTERNAL MEDICINE

## 2024-05-15 PROCEDURE — 1160F RVW MEDS BY RX/DR IN RCRD: CPT | Mod: CPTII,S$GLB,, | Performed by: INTERNAL MEDICINE

## 2024-05-15 PROCEDURE — 99999 PR PBB SHADOW E&M-EST. PATIENT-LVL V: CPT | Mod: PBBFAC,,, | Performed by: INTERNAL MEDICINE

## 2024-05-15 PROCEDURE — 99397 PER PM REEVAL EST PAT 65+ YR: CPT | Mod: S$GLB,,, | Performed by: INTERNAL MEDICINE

## 2024-05-15 PROCEDURE — 1125F AMNT PAIN NOTED PAIN PRSNT: CPT | Mod: CPTII,S$GLB,, | Performed by: INTERNAL MEDICINE

## 2024-05-15 PROCEDURE — 1100F PTFALLS ASSESS-DOCD GE2>/YR: CPT | Mod: CPTII,S$GLB,, | Performed by: INTERNAL MEDICINE

## 2024-05-15 PROCEDURE — 3288F FALL RISK ASSESSMENT DOCD: CPT | Mod: CPTII,S$GLB,, | Performed by: INTERNAL MEDICINE

## 2024-05-15 PROCEDURE — 3074F SYST BP LT 130 MM HG: CPT | Mod: CPTII,S$GLB,, | Performed by: INTERNAL MEDICINE

## 2024-05-15 PROCEDURE — 1159F MED LIST DOCD IN RCRD: CPT | Mod: CPTII,S$GLB,, | Performed by: INTERNAL MEDICINE

## 2024-05-15 NOTE — PROGRESS NOTES
CHIEF COMPLAINT:   Chief Complaint   Patient presents with    Annual Exam          HISTORY OF PRESENT ILLNESS:  Percy Ramirez is a 83 y.o. male who presents to the clinic today for a routine medical physical exam. His last physical exam was approximately 1 years(s) ago.    Subjective    PAST MEDICAL HISTORY:  Past Medical History:   Diagnosis Date    Allergy     Arthritis     CAD, multiple vessel     DR Melissa    Cataract     Depression     History of colon polyps     Hyperlipidemia     Hypertension     Macular degeneration     Dr Razo for injection, Jennifer for eye    S/P CABG x 2     Type 2 diabetes mellitus with circulatory disorder     Dr. Aquino       PAST SURGICAL HISTORY:  Past Surgical History:   Procedure Laterality Date    broken elbow      left    COLONOSCOPY N/A 10/4/2017    Procedure: COLONOSCOPY;  Surgeon: Gabriel Leung MD;  Location: [facility];  Service: Endoscopy;  Laterality: N/A;    EYE SURGERY      cataract    heart bypass          HERNIA REPAIR      right    ROTATOR CUFF REPAIR      right  2004       SOCIAL HISTORY:  Social History     Socioeconomic History    Marital status:     Number of children: 4    Years of education: GED   Occupational History    Occupation: retired tug    Tobacco Use    Smoking status: Former     Current packs/day: 0.00     Average packs/day: 3.0 packs/day for 45.0 years (135.0 ttl pk-yrs)     Types: Cigarettes     Start date: 1959     Quit date: 2004     Years since quittin.0    Smokeless tobacco: Former   Substance and Sexual Activity    Alcohol use: Yes     Comment: was heavy drinker 35 years ago, rare use now    Drug use: No    Sexual activity: Yes     Partners: Female       FAMILY HISTORY:  Family History   Problem Relation Name Age of Onset    Arthritis Mother      Diabetes Mother      Stroke Mother      Heart attack Father      Cancer Brother      Throat cancer Brother      Diabetes Maternal Aunt      Diabetes  "Maternal Uncle      Heart disease Maternal Uncle      Diabetes Paternal Aunt      Heart disease Paternal Aunt      Heart disease Paternal Uncle      Heart disease Maternal Grandmother      Cancer Maternal Grandfather         ALLERGIES AND MEDICATIONS: updated and reviewed.  Review of patient's allergies indicates:   Allergen Reactions    Aricept odt [donepezil] Diarrhea and Nausea And Vomiting    Codeine Nausea Only     weak    Ranexa [ranolazine]      Medication List with Changes/Refills   Current Medications    ACETAMINOPHEN (TYLENOL) 325 MG TABLET    Take 2 tablets (650 mg total) by mouth every 6 (six) hours as needed.    ALBUTEROL (PROVENTIL/VENTOLIN HFA) 90 MCG/ACTUATION INHALER    INHALE 2 PUFFS BY MOUTH EVERY 6 HOURS AS NEEDED FOR WHEEZING OR  SHORTNESS  OF  BREATH    ATORVASTATIN (LIPITOR) 40 MG TABLET    Take 40 mg by mouth once daily.    BD INSULIN PEN NEEDLE UF SHORT 31 GAUGE X 5/16" NDLE        BD INSULIN SYRINGE ULTRA-FINE 1/2 ML 31 GAUGE X 15/64" SYRG        BLOOD SUGAR DIAGNOSTIC STRP    To check BG 3 times daily, to use with insurance preferred meter    BLOOD-GLUCOSE METER KIT    To check BG 3 times daily, to use with insurance preferred meter    CARVEDILOL (COREG) 25 MG TABLET    Take 1 tablet (25 mg total) by mouth 2 (two) times daily.    CERAMIDES 1,3,6-II (CERAVE DAILY MOISTURIZING) LOTN    Apply twice daily to skin    CINNAMON BARK 500 MG CAPSULE    Take 1,000 mg by mouth once daily.      CLOPIDOGREL (PLAVIX) 75 MG TABLET    Take 75 mg by mouth.    DICLOFENAC SODIUM (VOLTAREN) 1 % GEL    Apply 2 g topically 2 (two) times daily.    DICLOFENAC SODIUM (VOLTAREN) 1 % GEL    Apply 2 g topically 3 (three) times daily.    DULOXETINE (CYMBALTA) 60 MG CAPSULE    Take 1 capsule (60 mg total) by mouth once daily.    ENTRESTO  MG PER TABLET    Take 1 tablet by mouth 2 (two) times daily.    FLUTICASONE PROPIONATE (FLONASE) 50 MCG/ACTUATION NASAL SPRAY    1 spray (50 mcg total) by Each Nostril route 2 " "(two) times daily.    FUROSEMIDE (LASIX) 40 MG TABLET    Take 40 mg by mouth once daily.    GABAPENTIN (NEURONTIN) 300 MG CAPSULE    Take 4 capsules (1,200 mg total) by mouth 2 (two) times daily.    GLIMEPIRIDE (AMARYL) 4 MG TABLET    Take 4 mg by mouth daily with breakfast.     HYDROCODONE-ACETAMINOPHEN (NORCO) 7.5-325 MG PER TABLET    Take 1 tablet by mouth every 8 (eight) hours as needed for Pain.    HYDROCODONE-ACETAMINOPHEN (NORCO) 7.5-325 MG PER TABLET    Take 1 tablet by mouth every 8 (eight) hours as needed for Pain.    INSULIN NPH-INSULIN REGULAR, 70/30, (NOVOLIN 70/30) 100 UNIT/ML (70-30) INJECTION    Inject into the skin. Inject 10 units at breakfast and inject 10 units at night    INSULIN SYRINGE-NEEDLE U-100 0.3 ML 31 GAUGE X 15/64" SYRG    USE TO INJECT INSULIN THREE TIMES DAILY    INSULIN SYRINGE-NEEDLE U-100 1/2 ML 31 X 5/16" SYRG        IPRATROPIUM (ATROVENT) 42 MCG (0.06 %) NASAL SPRAY    2 sprays by Nasal route every 6 (six) hours as needed for Rhinitis (use as needed for runny nose and also use 15 minutes prior to meal). Clean nose with saline prior to use    ISOSORBIDE MONONITRATE (IMDUR) 30 MG 24 HR TABLET    Take 30 mg by mouth once daily.    LANCETS MISC    To check BG 3 times daily, to use with insurance preferred meter    LIDOCAINE (LIDODERM) 5 %    Place 1 patch onto the skin once daily. Remove & Discard patch within 12 hours or as directed by MD    MECLIZINE (ANTIVERT) 12.5 MG TABLET    Take 1 tablet (12.5 mg total) by mouth 3 (three) times daily as needed for Dizziness.    METFORMIN (GLUCOPHAGE) 500 MG TABLET    Take 500 mg by mouth 2 (two) times daily.    METHOCARBAMOL (ROBAXIN) 500 MG TAB    TAKE 1 TABLET BY MOUTH 4 TIMES DAILY FOR 10 DAYS    MUPIROCIN (BACTROBAN) 2 % OINTMENT    Apply topically 3 (three) times daily.    NITROGLYCERIN (NITROSTAT) 0.4 MG SL TABLET    DISSOLVE 1 TABLET UNDER THE TONGUE EVERY 5 MINUTES AS  NEEDED FOR CHEST PAIN. MAX  OF 3 TABLETS IN 15 MINUTES. CALL 911 " IF PAIN PERSISTS.    PRAVASTATIN (PRAVACHOL) 20 MG TABLET    Take 1 tablet (20 mg total) by mouth once daily.    SPIRONOLACTONE (ALDACTONE) 25 MG TABLET    Take 25 mg by mouth.    TIOTROPIUM-OLODATEROL (STIOLTO RESPIMAT) 2.5-2.5 MCG/ACTUATION MIST    Inhale 2 puffs into the lungs once daily. Controller    TRIAMCINOLONE ACETONIDE 0.1% (KENALOG) 0.1 % CREAM    SMARTSI Application Topical 2-3 Times Daily    VALSARTAN (DIOVAN) 80 MG TABLET    Take 80 mg by mouth.    VIT C/VIT E AC/LUT/COPPER/ZINC (PRESERVISION LUTEIN ORAL)    Take by mouth.    WARFARIN (COUMADIN) 5 MG TABLET    Take 2 tabs by mouth on Tuesday,Thursday, Saturday and Sundays then 1.5 on all other days.   Discontinued Medications    COMIRNATY , 12Y UP,,PF, 30 MCG/0.3 ML INJECTION        FLUAD QUAD ,65Y UP,,PF, 60 MCG (15 MCG X 4)/0.5 ML SYRG             CARE TEAM:  Patient Care Team:  Kasie Edmondson MD as PCP - General (Internal Medicine)  Jac Rodriguez OD as Consulting Provider (Optometry)  Trever Arevalo MD as Consulting Physician (Ophthalmology)  Philippe Aquino MD as Consulting Physician (Endocrinology)  Mac Valderrama MD as Consulting Physician (Cardiology)  Marifer Romero DPM as Consulting Physician (Podiatry)  Remedios Madison LPN as Care Coordinator         SCREENING HISTORY:  Health Maintenance         Date Due Completion Date    COVID-19 Vaccine ( season) 2024 10/2/2023    Hemoglobin A1c 2024    Override on 2016: Done (A1c - 7.5)    Override on 2016: Done (7.0)    Override on 2015: Done (7.9%)    Diabetes Urine Screening 2024    Lipid Panel 2024    Override on 2016: Done (total 104, HDL 32, LDL 57, TG 73)    Override on 2015: Done (HDL=36, TG=38, LDL=57)    Eye Exam 2025 3/6/2024    Override on 2/3/2017: Done (Dr. Arevalo)    Override on 2016: Done (Jennifer - mild bilateral diabetic retinopathy; severe  "bilateral dry macular degeneration)    Override on 5/8/2015: Done    Override on 10/10/2014: Done    TETANUS VACCINE 07/11/2033 7/11/2023              REVIEW OF SYSTEMS:  The patient reports: poor dietary habits -  : likes to eat sweets .  The patient reports : that they are unable to exercise regularly because  of orthopaedic limitations.  Review of Systems   Constitutional:  Negative for chills and fever.   HENT:  Negative for congestion.    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   Respiratory:  Negative for cough and shortness of breath.    Cardiovascular:  Negative for chest pain.   Gastrointestinal:  Negative for abdominal pain.   Genitourinary:  Negative for dysuria.     ROS : patient denies: difficulty initiating urination          Objective    PHYSICAL EXAMINATION/VITALS:  Vitals:    05/15/24 0855   BP: 116/72   Pulse: 74   Temp: 98 °F (36.7 °C)   TempSrc: Oral   SpO2: (!) 94%  Comment: on 2 L oxygen   Weight: 89.9 kg (198 lb 4.9 oz)   Height: 5' 8" (1.727 m)       Body mass index is 30.15 kg/m².    General appearance - alert and in no distress, obese, pleasant elderly male, exam limited as patient could not get onto the examination table  Psychiatric - alert, oriented to person, place, and time, normal behavior, speech, dress, motor activity, fair memory  Chest - clear to auscultation, no wheezes, rales or rhonchi, symmetric air entry  Heart - irregularly irregular rate and rhythm - rate controlled  Neurological - alert, normal speech, no focal findings; cranial nerves II through XII intact  Musculoskeletal - not examined, patient ambulated with a walker  Extremities - no pedal edema noted  Skin - normal coloration, no suspicious skin lesions, senile purpura (mild) noted on upper extremities      LABS:  Lab Results   Component Value Date    HGBA1C 10.7 (H) 12/20/2023    HGBA1C 8.3 (H) 09/19/2023    HGBA1C 8.5 (H) 06/19/2023      Lab Results   Component Value Date    CHOL 98 " (L) 10/22/2013     Lab Results   Component Value Date    LDLCALC 56 12/20/2023    LDLCALC 45 09/19/2023    LDLCALC 40 06/19/2023             ASSESSMENT AND PLAN:   1. Routine medical exam  Counseled on age appropriate medical preventative services including age appropriate cancer screenings, age appropriate eye and dental exams, over all nutritional health, need for a consistent exercise regimen, and an over all push towards maintaining a vigorous and active lifestyle.  Counseled on age appropriate vaccines and discussed upcoming health care needs based on age/gender. Discussed good sleep hygiene and stress management.    2. Type 2 diabetes, with peripheral circulatory disorder not at goal/3. Poorly controlled type 2 diabetes mellitus with neuropathy/4. 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/5. Insulin long-term use  Lab Results   Component Value Date    HGBA1C 10.7 (H) 12/20/2023     Diabetes is not controlled at this time (due to hyperglycemia) for age and comorbid conditions. Overall diabetes control has worsened since last check.  We discussed diabetic diet and regular exercise.   We discussed home blood sugar monitoring, if appropriate - the patient should test three times per day with meals and as needed.   Continue current medication regimen.  Patient is followed by endocrinology.  He will do better with diabetic diet and regular physical activity.  Diabetic complications addressed: Stable decreased kidney function. Observe. Patient counseled to avoid/minimize the use of anti-inflammatory  Medication. Discussed to stay well hydrated. Also discussed with patient that good control of blood pressure and/or diabetes, if present, will help to prevent progression. Neuropathy pain controlled.  Patient denies claudication symptoms at this time.  Patient was counseled on the need for yearly eye exam to screen for/monitor diabetic retinopathy and yearly diabetic foot exam.  -     Hemoglobin A1C;  Future; Expected date: 05/15/2024  Followed by endocrinology, Dr. Aquino at       6. Benign hypertension with chronic kidney disease, stage III/7. Chronic kidney disease, stage 3a  BP Readings from Last 1 Encounters:   05/15/24 116/72      Discussed sodium restriction, maintaining ideal body weight and regular exercise program as physiologic means to achieve blood pressure control. The patient will strive towards this.   The current medical regimen is effective;  continue present plan and medications. Recommended patient to check home readings to monitor and see me for followup as scheduled or sooner as needed.   Patient was educated that both decongestant and anti-inflammatory medication may raise blood pressure.  Stable decreased kidney function. Observe. Patient counseled to avoid/minimize the use of anti-inflammatory  Medication. Discussed to stay well hydrated. Also discussed with patient that good control of blood pressure and/or diabetes, if present, will help to prevent progression.  The patient is not active on the digital hypertension program.    8. Persistent atrial fibrillation  The current medical regimen is effective;  continue present plan and medications.   Followed by: Cardiology.   Overview:  - on coumadin  - followed by [facility]      9. Chronic stable angina  The current medical regimen is effective;  continue present plan and medications.   Followed by: Cardiology.     10. Chronic combined systolic and diastolic heart failure  The current medical regimen is effective;  continue present plan and medications.   Followed by: Cardiology.   Overview:  -EF 30-40%  -entresto and follow by Dr. Valderrama.        11. Hyperlipidemia LDL goal <100  Lab Results   Component Value Date    CHOL 98 (L) 10/22/2013     Lab Results   Component Value Date    HDL 29 (L) 12/20/2023     Lab Results   Component Value Date    LDLCALC 56 12/20/2023     Lab Results   Component Value Date    TRIG 91 12/20/2023      Lab Results   Component Value Date    LDLCALC 56 12/20/2023     We discussed low fat diet and regular exercise.The current medical regimen is effective;  continue present plan and medications.     12. Atherosclerosis of abdominal aorta  Patient with Atherosclerosis of the Aorta.  Stable/asymptomatic. Currently stable on lipid lowering medication and blood pressure monitoring.  Overview:  - noted on ultrasound 10/25/2013      13. COPD with chronic bronchitis and emphysema  The current medical regimen is effective;  continue present plan and medications.   Followed by: Pulmonology.   Overview:  - Quit smoking since 1960s.   -fev1 52%  -Patient is up to date with vaccination.    -Declines pulmonary rehab  -On home oxygen which helps  -continue with Stiolto  -walker and wheelchair dependant.       14. ILD (interstitial lung disease)  The current medical regimen is effective;  continue present plan and medications.   Followed by: Pulmonology.   Overview:  -Emphysematous changes on ct along with basilar reticulation.  pft with moderate obstructive and mild restrictive physiology.  dlco significantly reduced (pt on home oxygen)  -Monitor lung function stable fvc and tlc      15. Secondary hyperparathyroidism of renal origin  Stable. Asymptomatic. Observe.    16. Major depressive disorder, recurrent episode, mild/17. MCI (mild cognitive impairment)  The current medical regimen is effective;  continue present plan and medications.   Overview:  Patient vision, lack of activity, and senior care have all contribute to the patient's decline in his overall mental health.  No evidence of dementia.  Patient stable on current dose of Aricept.      18. History of stroke  Stable/asymptomatic.  We discussed risk factor reduction.  Overview:  - right occipital      19. Senile purpura  Stable. Asymptomatic. Observe.    20. Primary osteoarthritis of both knees  The current medical regimen is effective;  continue present plan and  medications. Encouraged HEP.  Followed by: Orthopaedics.   Overview:  - followed by Dr. Cardenas      21. Chronic right-sided low back pain with right-sided sciatica  The current medical regimen is effective;  continue present plan and medications.     22. Obstructive sleep apnea treated with BiPAP  CPAP compliance: yes. The patient reports that they continue to benefit from regular use of their CPAP machine..  We discussed the potential ramifications of untreated sleep apnea, which could include daytime sleepiness, hypertension, heart disease including CHF, sudden death while sleeping and/or stroke. The patient was advised to abstain from driving should they feel sleepy or drowsy.  We discussed potential treatment options, which could include weight loss, body positioning, continuous positive airway pressure (CPAP), or referral for surgical consideration.   Overview:  -ahi of 31.7.  Patient is using and benefiting from bipap 16/10.         23. Overweight (BMI 25.0-29.9)  BMI Readings from Last 3 Encounters:   05/15/24 30.15 kg/m²   03/05/24 30.17 kg/m²   03/01/24 30.30 kg/m²     The patient is asked to make an attempt to improve diet and exercise patterns to aid in medical management of this problem.    24. Walker as ambulation aid  We discussed fall precautions.           Orders Placed This Encounter   Procedures    Hemoglobin A1C       FOLLOW UP: Follow up in about 6 months (around 11/15/2024), or if symptoms worsen or fail to improve, for follow up chronic medical conditions.. or sooner as needed.

## 2024-05-24 ENCOUNTER — TELEPHONE (OUTPATIENT)
Dept: PULMONOLOGY | Facility: HOSPITAL | Age: 84
End: 2024-05-24
Payer: MEDICARE

## 2024-05-24 ENCOUNTER — TELEPHONE (OUTPATIENT)
Dept: PULMONOLOGY | Facility: CLINIC | Age: 84
End: 2024-05-24
Payer: MEDICARE

## 2024-05-24 DIAGNOSIS — J44.9 CHRONIC OBSTRUCTIVE PULMONARY DISEASE, UNSPECIFIED COPD TYPE: Primary | ICD-10-CM

## 2024-05-24 NOTE — TELEPHONE ENCOUNTER
----- Message from Mor Li MA sent at 5/24/2024 10:59 AM CDT -----  Can you order a PFT and a 6 min walk and if he needs more Oxygen you can please the order and I can put him in somewhere when you get back from [place]?              Evangelina Resendez Staff  Caller: Unspecified (Today, 10:54 AM)  .Type: Patient Call Back    Who called:Self    What is the request in detail: Calling about oxygen Stated it;s on 2% he need more    Can the clinic reply by [facility]? No    Would the patient rather a call back or a response via [facility]? Call Back    Best call back number: .[phone] (home)      Additional Information:

## 2024-05-24 NOTE — TELEPHONE ENCOUNTER
Message sent to Dr. Alexandre.    Idget, MA  Pulm/Sleep [facility]  [phone]                     ----- Message from Evangelina Resendez sent at 5/24/2024 10:54 AM CDT -----  .Type: Patient Call Back    Who called:Self     What is the request in detail: Calling about oxygen Stated it;s on 2% he need more     Can the clinic reply by [facility]? No     Would the patient rather a call back or a response via [facility]? Call Back    Best call back number: .[phone] (home)       Additional Information:

## 2024-05-24 NOTE — TELEPHONE ENCOUNTER
Spoke with patient scheduled an appointment for PFT, 6 min walk, and doctor appt.    JEANMARIE Juares                 ----- Message from Mor Li MA sent at 5/24/2024 12:37 PM CDT -----  Pft and 6 min walk ordered

## 2024-05-28 ENCOUNTER — OFFICE VISIT (OUTPATIENT)
Dept: PODIATRY | Facility: CLINIC | Age: 84
End: 2024-05-28
Payer: MEDICARE

## 2024-05-28 VITALS
SYSTOLIC BLOOD PRESSURE: 113 MMHG | HEART RATE: 88 BPM | DIASTOLIC BLOOD PRESSURE: 58 MMHG | BODY MASS INDEX: 30.04 KG/M2 | HEIGHT: 68 IN | WEIGHT: 198.19 LBS

## 2024-05-28 DIAGNOSIS — M20.42 HAMMER TOES OF BOTH FEET: ICD-10-CM

## 2024-05-28 DIAGNOSIS — M20.11 HALLUX ABDUCTO VALGUS, RIGHT: ICD-10-CM

## 2024-05-28 DIAGNOSIS — M20.41 HAMMER TOES OF BOTH FEET: ICD-10-CM

## 2024-05-28 DIAGNOSIS — E11.49 TYPE II DIABETES MELLITUS WITH NEUROLOGICAL MANIFESTATIONS: Primary | ICD-10-CM

## 2024-05-28 DIAGNOSIS — L84 CORN OR CALLUS: ICD-10-CM

## 2024-05-28 DIAGNOSIS — M20.12 HALLUX ABDUCTO VALGUS, LEFT: ICD-10-CM

## 2024-05-28 DIAGNOSIS — B35.1 ONYCHOMYCOSIS DUE TO DERMATOPHYTE: ICD-10-CM

## 2024-05-28 PROCEDURE — 99999 PR PBB SHADOW E&M-EST. PATIENT-LVL V: CPT | Mod: PBBFAC,,, | Performed by: PODIATRIST

## 2024-05-28 PROCEDURE — 11056 PARNG/CUTG B9 HYPRKR LES 2-4: CPT | Mod: Q9,S$GLB,, | Performed by: PODIATRIST

## 2024-05-28 PROCEDURE — 11721 DEBRIDE NAIL 6 OR MORE: CPT | Mod: 59,Q9,S$GLB, | Performed by: PODIATRIST

## 2024-05-28 PROCEDURE — 99499 UNLISTED E&M SERVICE: CPT | Mod: S$GLB,,, | Performed by: PODIATRIST

## 2024-05-28 NOTE — PROGRESS NOTES
Subjective:      Patient ID: Percy Ramirez is a 83 y.o. male.    Chief Complaint: Diabetes Mellitus (5/15/2024 Dr. Edmondson ) and Nail Care      Percy is a 83 y.o. male who presents to the clinic for evaluation and treatment of high risk feet. Percy has a past medical history of Allergy, Arthritis, CAD, multiple vessel, Cataract, Depression, History of colon polyps, Hyperlipidemia, Hypertension, Macular degeneration, S/P CABG x 2, and Type 2 diabetes mellitus with circulatory disorder. The patient's chief complaint is elongated, thickened toenails aggravated by increased weight bearing, shoe gear, pressure.  Patient relates that the lateral aspect of his right 3rd digit nail became so long that he ripped it off himself and there was some bleeding but he believes the toe to be healed at this time.  This patient has documented high risk feet requiring routine maintenance secondary to diabetes mellitis and those secondary complications of diabetes, as mentioned..    PCP: Kasie Edmondson MD    Date Last Seen by PCP:   Chief Complaint   Patient presents with    Diabetes Mellitus     5/15/2024 Dr. Edmondson     Nail Care     Current shoe gear:  Velcro tennis shoes, new rx    Hemoglobin A1C   Date Value Ref Range Status   12/20/2023 10.7 (H) 4.0 - 5.6 % Final     Comment:     [facility]   09/19/2023 8.3 (H) 4.0 - 5.6 % Final     Comment:     [facility]   06/19/2023 8.5 (H) 4.0 - 5.6 % Final     Comment:     [facility]     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    Coronary artery disease    S/P CABG x 2    Macular degeneration    Obstructive sleep apnea treated with BiPAP    Anxiety state, unspecified    Insulin long-term use    Primary osteoarthritis of both knees    Chronic low back pain    DJD (degenerative joint disease), lumbar    Poorly  "controlled type 2 diabetes mellitus with neuropathy    Bilateral carotid artery disease    Hyperlipidemia LDL goal <100    Type 2 diabetes, with peripheral circulatory disorder not at goal    COPD with chronic bronchitis and emphysema    Atherosclerosis of abdominal aorta    Overweight (BMI 25.0-29.9)    Persistent atrial fibrillation    Chronic stable angina    Senile purpura    Goals of care, counseling/discussion    Osteoarthritis of carpometacarpal (CMC) joint of left thumb    MCI (mild cognitive impairment)    Dizziness and giddiness    Pulmonary nodule    Dyspnea on exertion    Chronic combined systolic and diastolic heart failure    Walker as ambulation aid    ILD (interstitial lung disease)    History of cardioversion    Erectile dysfunction    Secondary hyperparathyroidism of renal origin    Noncompliance with medication regimen    Exudative age-related macular degeneration, right eye, with active choroidal neovascularization    History of stroke    Chronic tension-type headache, intractable    Spinal stenosis of lumbar region with neurogenic claudication    Lumbar radiculopathy    Lumbar spondylosis    DDD (degenerative disc disease), lumbar    Cerebral atherosclerosis    Chronic kidney disease, stage 3a     Current Outpatient Medications on File Prior to Visit   Medication Sig Dispense Refill    acetaminophen (TYLENOL) 325 MG tablet Take 2 tablets (650 mg total) by mouth every 6 (six) hours as needed. 13 tablet 0    albuterol (PROVENTIL/VENTOLIN HFA) 90 mcg/actuation inhaler INHALE 2 PUFFS BY MOUTH EVERY 6 HOURS AS NEEDED FOR WHEEZING OR  SHORTNESS  OF  BREATH 54 g 3    atorvastatin (LIPITOR) 40 MG tablet Take 40 mg by mouth once daily.      BD INSULIN PEN NEEDLE UF SHORT 31 gauge x 5/16" Ndle       BD INSULIN SYRINGE ULTRA-FINE 1/2 mL 31 gauge x 15/64" Syrg       blood sugar diagnostic Strp To check BG 3 times daily, to use with insurance preferred meter 200 strip 11    carvedilol (COREG) 25 MG tablet " "Take 1 tablet (25 mg total) by mouth 2 (two) times daily. 180 tablet 0    ceramides 1,3,6-II (CERAVE DAILY MOISTURIZING) Lotn Apply twice daily to skin 355 mL 1    cinnamon bark 500 mg capsule Take 1,000 mg by mouth once daily.        clopidogreL (PLAVIX) 75 mg tablet Take 75 mg by mouth.      diclofenac sodium (VOLTAREN) 1 % Gel Apply 2 g topically 2 (two) times daily. 100 g 0    diclofenac sodium (VOLTAREN) 1 % Gel Apply 2 g topically 3 (three) times daily. 50 g 0    DULoxetine (CYMBALTA) 60 MG capsule Take 1 capsule (60 mg total) by mouth once daily. 90 capsule 3    ENTRESTO  mg per tablet Take 1 tablet by mouth 2 (two) times daily.      fluticasone propionate (FLONASE) 50 mcg/actuation nasal spray 1 spray (50 mcg total) by Each Nostril route 2 (two) times daily. 18.2 mL 3    furosemide (LASIX) 40 MG tablet Take 40 mg by mouth once daily.      gabapentin (NEURONTIN) 300 MG capsule Take 4 capsules (1,200 mg total) by mouth 2 (two) times daily. (Patient taking differently: Take 900 mg by mouth 2 (two) times daily.) 540 capsule 1    glimepiride (AMARYL) 4 MG tablet Take 4 mg by mouth daily with breakfast.       HYDROcodone-acetaminophen (NORCO) 7.5-325 mg per tablet Take 1 tablet by mouth every 8 (eight) hours as needed for Pain. 90 tablet 0    insulin NPH-insulin regular, 70/30, (NOVOLIN 70/30) 100 unit/mL (70-30) injection Inject into the skin. Inject 10 units at breakfast and inject 10 units at night      insulin syringe-needle U-100 0.3 mL 31 gauge x 15/64" Syrg USE TO INJECT INSULIN THREE TIMES DAILY      insulin syringe-needle U-100 1/2 mL 31 x 5/16" Syrg       ipratropium (ATROVENT) 42 mcg (0.06 %) nasal spray 2 sprays by Nasal route every 6 (six) hours as needed for Rhinitis (use as needed for runny nose and also use 15 minutes prior to meal). Clean nose with saline prior to use 15 mL 3    isosorbide mononitrate (IMDUR) 30 MG 24 hr tablet Take 30 mg by mouth once daily.      lancets Misc To check BG 3 " times daily, to use with insurance preferred meter 200 each 11    LIDOcaine (LIDODERM) 5 % Place 1 patch onto the skin once daily. Remove & Discard patch within 12 hours or as directed by MD 5 patch 1    meclizine (ANTIVERT) 12.5 mg tablet Take 1 tablet (12.5 mg total) by mouth 3 (three) times daily as needed for Dizziness. 90 tablet 0    metFORMIN (GLUCOPHAGE) 500 MG tablet Take 500 mg by mouth 2 (two) times daily.      methocarbamoL (ROBAXIN) 500 MG Tab TAKE 1 TABLET BY MOUTH 4 TIMES DAILY FOR 10 DAYS 60 tablet 0    mupirocin (BACTROBAN) 2 % ointment Apply topically 3 (three) times daily. 1 g 1    nitroGLYCERIN (NITROSTAT) 0.4 MG SL tablet DISSOLVE 1 TABLET UNDER THE TONGUE EVERY 5 MINUTES AS  NEEDED FOR CHEST PAIN. MAX  OF 3 TABLETS IN 15 MINUTES. CALL 911 IF PAIN PERSISTS.      pravastatin (PRAVACHOL) 20 MG tablet Take 1 tablet (20 mg total) by mouth once daily. 90 tablet 1    spironolactone (ALDACTONE) 25 MG tablet Take 25 mg by mouth.      tiotropium-olodateroL (STIOLTO RESPIMAT) 2.5-2.5 mcg/actuation Mist Inhale 2 puffs into the lungs once daily. Controller 1 g 11    triamcinolone acetonide 0.1% (KENALOG) 0.1 % cream SMARTSI Application Topical 2-3 Times Daily      valsartan (DIOVAN) 80 MG tablet Take 80 mg by mouth.      VIT C/VIT E AC/LUT/COPPER/ZINC (PRESERVISION LUTEIN ORAL) Take by mouth.      warfarin (COUMADIN) 5 MG tablet Take 2 tabs by mouth on Tuesday,Thursday, Saturday and Sundays then 1.5 on all other days.      blood-glucose meter kit To check BG 3 times daily, to use with insurance preferred meter 1 each 0     No current facility-administered medications on file prior to visit.     Review of patient's allergies indicates:   Allergen Reactions    Codeine Nausea Only     weak     Past Surgical History:   Procedure Laterality Date    broken elbow      left    COLONOSCOPY N/A 10/4/2017    Procedure: COLONOSCOPY;  Surgeon: Gabriel Leung MD;  Location: [facility];  Service: Endoscopy;   Laterality: N/A;    EYE SURGERY      cataract    heart bypass      2004    HERNIA REPAIR      right    ROTATOR CUFF REPAIR      right  2004     Family History   Problem Relation Name Age of Onset    Arthritis Mother      Diabetes Mother      Stroke Mother      Heart attack Father      Cancer Brother      Throat cancer Brother      Diabetes Maternal Aunt      Diabetes Maternal Uncle      Heart disease Maternal Uncle      Diabetes Paternal Aunt      Heart disease Paternal Aunt      Heart disease Paternal Uncle      Heart disease Maternal Grandmother      Cancer Maternal Grandfather       Social History     Socioeconomic History    Marital status:     Number of children: 4    Years of education: GED   Occupational History    Occupation: retired tug    Tobacco Use    Smoking status: Former     Current packs/day: 0.00     Average packs/day: 3.0 packs/day for 45.0 years (135.0 ttl pk-yrs)     Types: Cigarettes     Start date: 1959     Quit date: 2004     Years since quittin.1    Smokeless tobacco: Former   Substance and Sexual Activity    Alcohol use: Yes     Comment: was heavy drinker 35 years ago, rare use now    Drug use: No    Sexual activity: Yes     Partners: Female     Review of Systems   Constitution: Negative for chills, fever and weakness.   Cardiovascular: Negative for claudication and leg swelling.   Respiratory: Positive for cough. Negative for shortness of breath.    Skin: Positive for dry skin and nail changes. Negative for itching and rash.   Musculoskeletal: Positive for arthritis, back pain and joint pain. Negative for falls, joint swelling and muscle weakness.   Gastrointestinal: Negative for diarrhea, nausea and vomiting.   Neurological: Positive for numbness and paresthesias. Negative for tremors.   Psychiatric/Behavioral: Negative for altered mental status and hallucinations.           Objective:       Vitals:    24 0749   BP: (!) 113/58   Pulse: 88   Weight:  "89.9 kg (198 lb 3.1 oz)   Height: 5' 8" (1.727 m)         Physical Exam   Constitutional:   General: Pt. is well-developed, well-nourished, appears stated age, in no acute distress, alert and oriented x 3. No evidence of depression, anxiety, or agitation. Calm, cooperative, and communicative. Appropriate interactions and affect.       Cardiovascular:   Pulses:       Dorsalis pedis pulses are 2+ on the right side, and 2+ on the left side.        Posterior tibial pulses are 1+ on the right side, and 1+ on the left side.   There is decreased digital hair.   Musculoskeletal:        Right foot: There is normal range of motion.        Left foot:  There is normal range of motion.   Muscle strength is 5/5 in all groups bilaterally.    Decreased stride, station of gait.  apropulsive toe off.  Increased angle and base of gait.    Patient has hammertoes of digits 2-5 bilateral partially reducible without symptom today.    Visible and palpable bunion without pain at dorsomedial 1st metatarsal head right and left.  Hallux abducted right and left partially reducible, tracks laterally without being track bound.  No ecchymosis, erythema, edema, or cardinal signs infection or signs of trauma same foot.     Neurological: A sensory deficit is present.   Glencross-Keon 5.07 monofilamant testing is diminished Farhad feet. Sharp/dull sensation diminished Bilaterally   Skin: Skin is warm, dry. No abrasion, no ecchymosis, no rash noted. He is not diaphoretic. No cyanosis. No pallor. Nails show no clubbing.   Toenails 1-5 bilaterally are elongated by 2-3 mm, thickened by 2-3 mm, discolored/yellowed, dystrophic, brittle with subungual debris. Dry blood to the lateral aspect of the right 3rd digit nail     Focal hyperkeratotic lesion consisting entirely of hyperkeratotic tissue without open skin, drainage, pus, fluctuance, malodor, or signs of infection: medial IPJ of hallux farhad    Interdigital Spaces clean, dry and without evidence of break " in skin integrity   Psychiatric: He has a normal mood and affect. His speech is normal.   Nursing note and vitals reviewed.        Assessment:       Encounter Diagnoses   Name Primary?    Type II diabetes mellitus with neurological manifestations Yes    Onychomycosis due to dermatophyte     Corn or callus     Hammer toes of both feet     Hallux abducto valgus, left     Hallux abducto valgus, right            Plan:       Percy was seen today for diabetes mellitus and nail care.    Diagnoses and all orders for this visit:    Type II diabetes mellitus with neurological manifestations    Onychomycosis due to dermatophyte    Corn or callus    Hammer toes of both feet    Hallux abducto valgus, left    Hallux abducto valgus, right        I counseled the patient on his conditions, their implications and medical management.      - Shoe inspection. Diabetic Foot Education. Patient reminded of the importance of good nutrition and blood sugar control to help prevent podiatric complications of diabetes. Patient instructed on proper foot hygeine. We discussed wearing proper shoe gear, daily foot inspections, never walking without protective shoe gear, never putting sharp instruments to feet    -Significant increase noted in his last hemoglobin A1c in comparison to those previously obtained.  Discussed with patient how uncontrolled diabetes can affect the neurovascular status of his  foot as well as his ability to heal wounds and fight infection.    - With patient's permission, nails were aggressively reduced and debrided x 10 to their soft tissue attachment mechanically, removing all offending nail and debris. Silver nitrate to any abrasion Patient relates relief following the procedure.    - After cleansing the  area w/ alcohol prep pad the above mentioned hyperkeratosis was trimmed utilizing No 15 scapel, to a smooth base with out incident. Patient tolerated this  well and reported comfort to the area of medial hallux IPJ  concha    - He will continue to monitor the areas daily, inspect his feet, wear protective shoe gear when ambulatory, moisturizer to maintain skin integrity and follow in this office in approximately 2-3 months, sooner PRN

## 2024-05-28 NOTE — PATIENT INSTRUCTIONS
Over the counter pain creams: Voltaren Gel, Biofreeze, Bengay, tiger balm, two old goat, lidocaine gel,  Absorbine Veterinary Liniment Gel Topical Analgesic Sore Muscle and Joint Pain Relief    Recommend lotions: eucerin, eucerin for diabetics, aquaphor, A&D ointment, gold bond for diabetics, sween, [place]'s Bees all purpose baby ointment,  urea 40 with aloe or SkinIntegra rapid crack repair (found on amazon.com)    Shoe recommendations: (try 6pm.com, zappos.com , nordstromrack.Uniplaces, or shoes.com for discounted prices) you can visit varsity shoes in [place], DSW shoes in [place]  or petar rack in the [facility] (there are also several shoe brand outlets in the [facility])    ONLY purchase stability style tennis shoes NO flex, foam, free, yoga mat style shoes    Shoe examples:    Asics (GT 4000 or gel foundations), new balance stability type shoes (such as the 940 series), saucony (stabil c3),  Mahoney (GTS or Beast or   transcend), propet, HokaOne (tennis shoe) Jose (tennis shoes and boots)    Choloft Lars (women) Gustabo&Billy (men), clarks, crocs, aerosoles, naturalizers, SAS, ecco, born, ngoc alra, rockports (dress shoes)    Vionic, burkenstocks, fitflops, propet, taos, baretraps (sandals)    HokaOne sandals, crocs, propet (house shoes)      Nail Home remedy:  Vicks Vapor rub or Emuaid to nails for easier manageability    Diabetes: Inspecting Your Feet  Diabetes increases your chances of developing foot problems. So inspect your feet every day. This helps you find small skin irritations before they become serious infections. If you have trouble seeing the bottoms of your feet, use a mirror or ask a family member or friend to help.     Pressure spots on the bottom of the foot are common areas where problems develop.   How to check your feet  Below are tips to help you look for foot problems. Try to check your feet at the same time each day, such as when you get out of bed in the morning:  Check the top of  each foot. The tops of toes, back of the heel, and outer edge of the foot can get a lot of rubbing from poor-fitting shoes.  Check the bottom of each foot. Daily wear and tear often leads to problems at pressure spots.  Check the toes and nails. Fungal infections often occur between toes. Toenail problems can also be a sign of fungal infections or lead to breaks in the skin.  Check your shoes, too. Loose objects inside a shoe can injure the foot. Use your hand to feel inside your shoes for things like alfredo, loose stitching, or rough areas that could irritate your skin.  Warning signs  Look for any color changes in the foot. Redness with streaks can signal a severe infection, which needs immediate medical attention. Tell your doctor right away if you have any of these problems:  Swelling, sometimes with color changes, may be a sign of poor blood flow or infection. Symptoms include tenderness and an increase in the size of your foot.  Warm or hot areas on your feet may be signs of infection. A foot that is cold may not be getting enough blood.  Sensations such as burning, tingling, or pins and needles can be signs of a problem. Also check for areas that may be numb.  Hot spots are caused by friction or pressure. Look for hot spots in areas that get a lot of rubbing. Hot spots can turn into blisters, calluses, or sores.  Cracks and sores are caused by dry or irritated skin. They are a sign that the skin is breaking down, which can lead to infection.  Toenail problems to watch for include nails growing into the skin (ingrown toenail) and causing redness or pain. Thick, yellow, or discolored nails can signal a fungal infection.  Drainage and odor can develop from untreated sores and ulcers. Call your doctor right away if you notice white or yellow drainage, bleeding, or unpleasant odor.   © 7100-8910 [employer]. [address] [place] [address], [place] [address]. All rights reserved. This information is not  intended as a substitute for professional medical care. Always follow your healthcare professional's instructions.        Step-by-Step:  Inspecting Your Feet (Diabetes)    Date Last Reviewed: 10/1/2016  © 5742-3591 [employer]. [address] [place] [address], [place] [address]. All rights reserved. This information is not intended as a substitute for professional medical care. Always follow your healthcare professional's instructions.

## 2024-05-29 ENCOUNTER — HOSPITAL ENCOUNTER (OUTPATIENT)
Dept: RESPIRATORY THERAPY | Facility: HOSPITAL | Age: 84
Discharge: HOME | End: 2024-05-29
Attending: INTERNAL MEDICINE
Payer: MEDICARE

## 2024-05-29 VITALS — RESPIRATION RATE: 18 BRPM | HEART RATE: 82 BPM | OXYGEN SATURATION: 99 %

## 2024-05-29 DIAGNOSIS — J44.9 CHRONIC OBSTRUCTIVE PULMONARY DISEASE, UNSPECIFIED COPD TYPE: ICD-10-CM

## 2024-05-29 PROCEDURE — 94727 GAS DIL/WSHOT DETER LNG VOL: CPT | Mod: 26,,, | Performed by: INTERNAL MEDICINE

## 2024-05-29 PROCEDURE — 94729 DIFFUSING CAPACITY: CPT | Mod: 26,,, | Performed by: INTERNAL MEDICINE

## 2024-05-29 PROCEDURE — 94727 GAS DIL/WSHOT DETER LNG VOL: CPT

## 2024-05-29 PROCEDURE — 94729 DIFFUSING CAPACITY: CPT

## 2024-05-29 PROCEDURE — 25000242 PHARM REV CODE 250 ALT 637 W/ HCPCS: Performed by: INTERNAL MEDICINE

## 2024-05-29 PROCEDURE — 94060 EVALUATION OF WHEEZING: CPT | Mod: 26,,, | Performed by: INTERNAL MEDICINE

## 2024-05-29 RX ORDER — ALBUTEROL SULFATE 2.5 MG/.5ML
2.5 SOLUTION RESPIRATORY (INHALATION) ONCE
Status: COMPLETED | OUTPATIENT
Start: 2024-05-29 | End: 2024-05-29

## 2024-05-29 RX ADMIN — ALBUTEROL SULFATE 2.5 MG: 2.5 SOLUTION RESPIRATORY (INHALATION) at 10:05

## 2024-05-30 ENCOUNTER — OFFICE VISIT (OUTPATIENT)
Dept: ORTHOPEDICS | Facility: CLINIC | Age: 84
End: 2024-05-30
Payer: MEDICARE

## 2024-05-30 DIAGNOSIS — M17.12 PRIMARY OSTEOARTHRITIS OF LEFT KNEE: ICD-10-CM

## 2024-05-30 DIAGNOSIS — M25.562 LEFT KNEE PAIN, UNSPECIFIED CHRONICITY: ICD-10-CM

## 2024-05-30 DIAGNOSIS — M65.30 TRIGGER FINGER, UNSPECIFIED FINGER, UNSPECIFIED LATERALITY: Primary | ICD-10-CM

## 2024-05-30 PROCEDURE — 1157F ADVNC CARE PLAN IN RCRD: CPT | Mod: CPTII,S$GLB,, | Performed by: ORTHOPAEDIC SURGERY

## 2024-05-30 PROCEDURE — 1159F MED LIST DOCD IN RCRD: CPT | Mod: CPTII,S$GLB,, | Performed by: ORTHOPAEDIC SURGERY

## 2024-05-30 PROCEDURE — 99213 OFFICE O/P EST LOW 20 MIN: CPT | Mod: 25,S$GLB,, | Performed by: ORTHOPAEDIC SURGERY

## 2024-05-30 PROCEDURE — 20610 DRAIN/INJ JOINT/BURSA W/O US: CPT | Mod: LT,S$GLB,, | Performed by: ORTHOPAEDIC SURGERY

## 2024-05-30 PROCEDURE — 1160F RVW MEDS BY RX/DR IN RCRD: CPT | Mod: CPTII,S$GLB,, | Performed by: ORTHOPAEDIC SURGERY

## 2024-05-30 PROCEDURE — 1125F AMNT PAIN NOTED PAIN PRSNT: CPT | Mod: CPTII,S$GLB,, | Performed by: ORTHOPAEDIC SURGERY

## 2024-05-30 PROCEDURE — 99999 PR PBB SHADOW E&M-EST. PATIENT-LVL III: CPT | Mod: PBBFAC,,, | Performed by: ORTHOPAEDIC SURGERY

## 2024-05-30 NOTE — PROGRESS NOTES
Follow up visit    History of Present Illness:   Percy Ramirez comes to the office for follow up evaluation of left knee arthritis   He has failed other treatment modalities including medications,  activity modification and steroid injection. He is here today for viscosupplementation - Durolane     MEDICAL NECESSITY FOR VISCOSUPPLEMENTATION:  A thorough evaluation of the patient, have determined that viscosupplementation is medically necessary.  The patient has painful DJD of the knee with failure of conservative therapy including lifestyle modifications and rehabilitation exercises.  Oral analgesics/NSAIDs have not adequately controlled symptoms and there is radiographic evidence of joint space narrowing, subchondral sclerosis, and osteophyte formation - Kellgren Toi grade 2 or greater.       Still having triggering of L hand, injection was not helpful   Wants to know what else can be done    ROS: unremarkable and no change since last visit    Physical Examination:    Vitals:    05/30/24 1400   PainSc: 10-Worst pain ever   PainLoc: Knee      NAD  Left knee  No change in exam   No evidence of adverse effect of injection     Left hand  Triggering of index finger    Radiographic imaging: no new imaging    Assessment/Plan:  83 y.o. male with left knee arthritis, LLF trigger finger    Injection to Left knee  performed, please see procedure note for details.  We discussed the risks and benefits of injection including pain, infection, bleeding, failure to improve pain, temporary increase in pain, synovitis, and damage to surrounding structures including nerves, blood vessels, tendons and muscles.   Still having triggering. Can repeat injection in 1 month or consider surgical release, Messaged PCP about surgery clearance. He is not in good health and may not be a surgical candidate. Can be done under local only if needed.   2.   Return for follow up visit: 1 week for next injection    All questions were answered in  detail. The patient  verbalized the understanding of the treatment plan and is in full agreement with the treatment plan.

## 2024-05-30 NOTE — PROCEDURES
Large Joint Aspiration/Injection: L knee    Date/Time: 5/30/2024 2:00 PM    Performed by: Elda Campbell MD  Authorized by: Elda Campbell MD    Consent Done?:  Yes (Verbal)  Indications:  Arthritis  Timeout: prior to procedure the correct patient, procedure, and site was verified    Prep: patient was prepped and draped in usual sterile fashion      Local anesthesia used?: Yes    Local anesthetic:  Topical anesthetic    Details:  Needle Size:  22 G  Approach:  Anteromedial  Location:  Knee  Site:  L knee  Medications:  60 mg hyaluronate sodium, stabilized (DUROLANE) 60 mg/3 mL  Patient tolerance:  Patient tolerated the procedure well with no immediate complications

## 2024-05-31 ENCOUNTER — OFFICE VISIT (OUTPATIENT)
Dept: PAIN MEDICINE | Facility: CLINIC | Age: 84
End: 2024-05-31
Payer: MEDICARE

## 2024-05-31 ENCOUNTER — LAB VISIT (OUTPATIENT)
Dept: LAB | Facility: HOSPITAL | Age: 84
End: 2024-05-31
Payer: MEDICARE

## 2024-05-31 VITALS — HEART RATE: 76 BPM | SYSTOLIC BLOOD PRESSURE: 135 MMHG | OXYGEN SATURATION: 92 % | DIASTOLIC BLOOD PRESSURE: 67 MMHG

## 2024-05-31 DIAGNOSIS — M51.36 DDD (DEGENERATIVE DISC DISEASE), LUMBAR: Primary | ICD-10-CM

## 2024-05-31 DIAGNOSIS — M54.16 LUMBAR RADICULOPATHY: ICD-10-CM

## 2024-05-31 DIAGNOSIS — M47.816 LUMBAR SPONDYLOSIS: ICD-10-CM

## 2024-05-31 DIAGNOSIS — M48.062 SPINAL STENOSIS OF LUMBAR REGION WITH NEUROGENIC CLAUDICATION: ICD-10-CM

## 2024-05-31 DIAGNOSIS — M51.36 DDD (DEGENERATIVE DISC DISEASE), LUMBAR: ICD-10-CM

## 2024-05-31 PROCEDURE — 1125F AMNT PAIN NOTED PAIN PRSNT: CPT | Mod: CPTII,S$GLB,,

## 2024-05-31 PROCEDURE — 1157F ADVNC CARE PLAN IN RCRD: CPT | Mod: CPTII,S$GLB,,

## 2024-05-31 PROCEDURE — 99214 OFFICE O/P EST MOD 30 MIN: CPT | Mod: S$GLB,,,

## 2024-05-31 PROCEDURE — 36415 COLL VENOUS BLD VENIPUNCTURE: CPT | Mod: PN

## 2024-05-31 PROCEDURE — 1100F PTFALLS ASSESS-DOCD GE2>/YR: CPT | Mod: CPTII,S$GLB,,

## 2024-05-31 PROCEDURE — 3075F SYST BP GE 130 - 139MM HG: CPT | Mod: CPTII,S$GLB,,

## 2024-05-31 PROCEDURE — 1159F MED LIST DOCD IN RCRD: CPT | Mod: CPTII,S$GLB,,

## 2024-05-31 PROCEDURE — 1160F RVW MEDS BY RX/DR IN RCRD: CPT | Mod: CPTII,S$GLB,,

## 2024-05-31 PROCEDURE — 80307 DRUG TEST PRSMV CHEM ANLYZR: CPT

## 2024-05-31 PROCEDURE — 99999 PR PBB SHADOW E&M-EST. PATIENT-LVL IV: CPT | Mod: PBBFAC,,,

## 2024-05-31 PROCEDURE — 3288F FALL RISK ASSESSMENT DOCD: CPT | Mod: CPTII,S$GLB,,

## 2024-05-31 PROCEDURE — 3078F DIAST BP <80 MM HG: CPT | Mod: CPTII,S$GLB,,

## 2024-05-31 RX ORDER — HYDROCODONE BITARTRATE AND ACETAMINOPHEN 7.5; 325 MG/1; MG/1
1 TABLET ORAL EVERY 8 HOURS PRN
Qty: 90 TABLET | Refills: 0 | Status: SHIPPED | OUTPATIENT
Start: 2024-07-21 | End: 2024-08-20

## 2024-05-31 RX ORDER — HYDROCODONE BITARTRATE AND ACETAMINOPHEN 7.5; 325 MG/1; MG/1
1 TABLET ORAL EVERY 8 HOURS PRN
Qty: 90 TABLET | Refills: 0 | Status: SHIPPED | OUTPATIENT
Start: 2024-08-20 | End: 2024-09-19

## 2024-05-31 RX ORDER — HYDROCODONE BITARTRATE AND ACETAMINOPHEN 7.5; 325 MG/1; MG/1
1 TABLET ORAL EVERY 8 HOURS PRN
Qty: 90 TABLET | Refills: 0 | Status: SHIPPED | OUTPATIENT
Start: 2024-06-21 | End: 2024-07-21

## 2024-05-31 NOTE — PROGRESS NOTES
Subjective:     Patient ID: Percy Ramirez is a 83 y.o. male    Chief Complaint: Follow-up      Referred by: No ref. provider found      HPI:    Interval History PA (05/31/2024):  Patient returns to clinic for follow up and medication refill.  Patient denies any changes in the quality or location of his pain since previous visit.  He denies any new or worsening symptoms.  He continues to take Norco 7.5-325 mg q.8 hours p.r.n. with adequate relief, denies any adverse effects from this medication.    Interval History PA (03/01/2024):  Patient returns to clinic for follow up and medication refill.  Patient denies any changes in the quality or location of his pain since previous visit.  He denies any new or worsening symptoms.  Recently patient increase to Norco 7.5-325 mg q.8 hours p.r.n. with adequate relief, denies any adverse effects from this medication.  Notes that the increased dosage has been more beneficial in helping control his pain levels.    Interval History (2/2/24):  He returns today for follow up.  He reports that he continues to have back pain as before.  Denies any changes in the quality or location of his pain.  Denies any new worsening symptoms.  Still has pain mainly when standing up and trying to do activities such as cooking.  Can only stand for a relatively short period time before needing to sit down.  Continues take Norco 5-325 mg q.8 hours p.r.n..  Has been prescribed 60 tablets per month as he reports taking roughly 2 pills per day.  However, states that medication seems to be a little less effective lately.  Has been taking up to 3 times a day as prescribed.  He reports that he gets good relief from each dose, but still has significant pain.  He thinks that increasing the dosage may provide additional benefit.  He denies any adverse effects of this medication.  He is reluctant to increase dosage too much as he does not like to feel sedated.      Interval History PA (11/03/2023):  Patient  returns to clinic for follow up and medication refill.  Patient denies any changes in the quality or location of his pain since previous visit.  Patient does report some worsening of pain overall.  Notes specifically worsening of midline lower back pain with prolonged activity.  Notes that he continues to take Norco 5-325 mg q.8 hours p.r.n. #60.  Typically splitting the pills in half and taking every 8 hours.  However due to recently increased pain he has been taking a full pill to help manage his pain.  Reporting adequate relief.  Denies any adverse effects from this medication.    Interval History PA (08/18/2023):  Patient returns to clinic for follow up of and medication refill.  Patient does report since previous visit he has had a incident where he fell at his home.  Occurred approximately 2 weeks ago and has been dealing with some increased pain around his lower lumbar region as well as hip.  States overall pain has been improving since incident.  Significant bruising noted midline lower lumbar spine and around his hips and arms.  Patient has been evaluated by primary care regarding this incident, imaging without evidence of fracture.  He continues to take Norco 5-325 mg q.8 hours p.r.n. #60.  States since the incident he has been taking a full pill to help manage the pain.  Continues to take 2 total pills per day with adequate relief.  Previously was cutting the pills in half and taking every 8 hours.  Denies any adverse effects from this medication.  Notes that he is scheduled to start physical therapy to help with his mobility.    Interval History PA (06/15/2023):  Patient returns to clinic for follow up and medication refill.  She denies any changes in the quality or location his pain since previous visit.  Denies any new or worsening symptoms.  Continues to take Norco 5-325 mg q.8 hours p.r.n. #60. with adequate relief, denies any adverse effects from this medication.  Continues to use with the pills in  half and will occasionally take a full pill for episodes of increased pain.  Approximately 2 pills per day.    Interval History PA (03/24/2023):  Patient returns to clinic for follow up of chronic lower back and extremity pain and medication refill.  Patient denies any changes in the quality or location of his pain since previous visit.  Denies any new or worsening symptoms.  Patient was recently started on Preston 5-325 Q 8 hours p.r.n..  He reports typically spitting the pills in half and occasionally taking a full pill with increased pain.  Notes taking approximately 2 pill total per day.  Notes adequate relief from this medication, denies any adverse effects.    Initial Encounter (2/24/23):  Percy Ramirez is a 83 y.o. male who presents today with chronic low back and lower extremity pain.  This pain has been present for years.  No specific inciting events or injuries noted.  Pain has been worsening over time.  Pain is constant but significantly worsened with standing or walking.  Can only stand or walk for short periods of time before needing to rest.  Pain is located in the midline lower lumbar spine and will radiate into the bilateral lumbar paraspinal regions and hips.  Denies any persistent numbness, tingling, weakness, bowel bladder dysfunction.  Does report that his legs feel weak after standing or walking for prolonged periods of time.  Pain improves upon sitting but still has pain when sitting.   This pain is described in detail below.    Physical Therapy:  No.    Non-pharmacologic Treatment:  Rest helps         TENS?  No    Pain Medications:         Currently taking:  Tylenol, gabapentin, Robaxin, Cymbalta, Norco 7.5-325 mg Q 8 hours prn    Has tried in the past:      Has not tried:  NSAIDs, TCAs, topical creams    Blood thinners:  Coumadin    Interventional Therapies:  None    Relevant Surgeries:  None    Affecting sleep?  Yes    Affecting daily activities? yes    Depressive symptoms? no           SI/HI? No    Work status: Retired    Pain Scores:    Best:       8/10  Worst:     10/10  Usually:   9/10  Today:    9/10    Pain Disability Index  Family/Home Responsibilities:: 10  Recreation:: 10  Social Activity:: 10  Occupation:: 0  Sexual Behavior:: 0  Self Care:: 10  Life-Support Activities:: 0  Pain Disability Index (PDI): 40    Review of Systems   Constitutional:  Negative for activity change, appetite change, chills, fatigue, fever and unexpected weight change.   HENT:  Negative for hearing loss.    Eyes:  Negative for visual disturbance.   Respiratory:  Negative for chest tightness and shortness of breath.    Cardiovascular:  Negative for chest pain.   Gastrointestinal:  Negative for abdominal pain, constipation, diarrhea, nausea and vomiting.   Genitourinary:  Negative for difficulty urinating.   Musculoskeletal:  Positive for arthralgias, back pain, gait problem and myalgias. Negative for neck pain.   Skin:  Negative for rash.   Neurological:  Negative for dizziness, weakness, light-headedness, numbness and headaches.   Psychiatric/Behavioral:  Positive for sleep disturbance. Negative for hallucinations and suicidal ideas. The patient is not nervous/anxious.        Past Medical History:   Diagnosis Date    Allergy     Arthritis     CAD, multiple vessel     DR Melissa    Cataract     Depression     History of colon polyps     Hyperlipidemia     Hypertension     Macular degeneration     Dr Razo for injection, Jennifer for eye    S/P CABG x 2     Type 2 diabetes mellitus with circulatory disorder     Dr. Aquino       Past Surgical History:   Procedure Laterality Date    broken elbow      left    COLONOSCOPY N/A 10/4/2017    Procedure: COLONOSCOPY;  Surgeon: Gabriel Leung MD;  Location: [facility];  Service: Endoscopy;  Laterality: N/A;    EYE SURGERY      cataract    heart bypass      2004    HERNIA REPAIR      right    ROTATOR CUFF REPAIR      right  2004       Social History     Socioeconomic  "History    Marital status:     Number of children: 4    Years of education: GED   Occupational History    Occupation: retired tug boat pilot   Tobacco Use    Smoking status: Former     Current packs/day: 0.00     Average packs/day: 3.0 packs/day for 45.0 years (135.0 ttl pk-yrs)     Types: Cigarettes     Start date: 1959     Quit date: 2004     Years since quittin.1    Smokeless tobacco: Former   Substance and Sexual Activity    Alcohol use: Yes     Comment: was heavy drinker 35 years ago, rare use now    Drug use: No    Sexual activity: Yes     Partners: Female       Review of patient's allergies indicates:   Allergen Reactions    Aricept odt [donepezil] Diarrhea and Nausea And Vomiting    Codeine Nausea Only     weak    Ranexa [ranolazine]        Current Outpatient Medications on File Prior to Visit   Medication Sig Dispense Refill    acetaminophen (TYLENOL) 325 MG tablet Take 2 tablets (650 mg total) by mouth every 6 (six) hours as needed. 13 tablet 0    albuterol (PROVENTIL/VENTOLIN HFA) 90 mcg/actuation inhaler INHALE 2 PUFFS BY MOUTH EVERY 6 HOURS AS NEEDED FOR WHEEZING OR  SHORTNESS  OF  BREATH 54 g 3    atorvastatin (LIPITOR) 40 MG tablet Take 40 mg by mouth once daily.      BD INSULIN PEN NEEDLE UF SHORT 31 gauge x 5/16" Ndle       BD INSULIN SYRINGE ULTRA-FINE 1/2 mL 31 gauge x 15/64" Syrg       blood sugar diagnostic Strp To check BG 3 times daily, to use with insurance preferred meter 200 strip 11    carvedilol (COREG) 25 MG tablet Take 1 tablet (25 mg total) by mouth 2 (two) times daily. 180 tablet 0    ceramides 1,3,6-II (CERAVE DAILY MOISTURIZING) Lotn Apply twice daily to skin 355 mL 1    cinnamon bark 500 mg capsule Take 1,000 mg by mouth once daily.        clopidogreL (PLAVIX) 75 mg tablet Take 75 mg by mouth.      diclofenac sodium (VOLTAREN) 1 % Gel Apply 2 g topically 2 (two) times daily. 100 g 0    diclofenac sodium (VOLTAREN) 1 % Gel Apply 2 g topically 3 (three) times " "daily. 50 g 0    DULoxetine (CYMBALTA) 60 MG capsule Take 1 capsule (60 mg total) by mouth once daily. 90 capsule 3    ENTRESTO  mg per tablet Take 1 tablet by mouth 2 (two) times daily.      fluticasone propionate (FLONASE) 50 mcg/actuation nasal spray 1 spray (50 mcg total) by Each Nostril route 2 (two) times daily. 18.2 mL 3    furosemide (LASIX) 40 MG tablet Take 40 mg by mouth once daily.      gabapentin (NEURONTIN) 300 MG capsule Take 4 capsules (1,200 mg total) by mouth 2 (two) times daily. (Patient taking differently: Take 900 mg by mouth 2 (two) times daily.) 540 capsule 1    glimepiride (AMARYL) 4 MG tablet Take 4 mg by mouth daily with breakfast.       HYDROcodone-acetaminophen (NORCO) 7.5-325 mg per tablet Take 1 tablet by mouth every 8 (eight) hours as needed for Pain. 90 tablet 0    insulin NPH-insulin regular, 70/30, (NOVOLIN 70/30) 100 unit/mL (70-30) injection Inject into the skin. Inject 10 units at breakfast and inject 10 units at night      insulin syringe-needle U-100 0.3 mL 31 gauge x 15/64" Syrg USE TO INJECT INSULIN THREE TIMES DAILY      insulin syringe-needle U-100 1/2 mL 31 x 5/16" Syrg       ipratropium (ATROVENT) 42 mcg (0.06 %) nasal spray 2 sprays by Nasal route every 6 (six) hours as needed for Rhinitis (use as needed for runny nose and also use 15 minutes prior to meal). Clean nose with saline prior to use 15 mL 3    isosorbide mononitrate (IMDUR) 30 MG 24 hr tablet Take 30 mg by mouth once daily.      lancets Misc To check BG 3 times daily, to use with insurance preferred meter 200 each 11    LIDOcaine (LIDODERM) 5 % Place 1 patch onto the skin once daily. Remove & Discard patch within 12 hours or as directed by MD 5 patch 1    meclizine (ANTIVERT) 12.5 mg tablet Take 1 tablet (12.5 mg total) by mouth 3 (three) times daily as needed for Dizziness. 90 tablet 0    metFORMIN (GLUCOPHAGE) 500 MG tablet Take 500 mg by mouth 2 (two) times daily.      methocarbamoL (ROBAXIN) 500 MG " Tab TAKE 1 TABLET BY MOUTH 4 TIMES DAILY FOR 10 DAYS 60 tablet 0    mupirocin (BACTROBAN) 2 % ointment Apply topically 3 (three) times daily. 1 g 1    nitroGLYCERIN (NITROSTAT) 0.4 MG SL tablet DISSOLVE 1 TABLET UNDER THE TONGUE EVERY 5 MINUTES AS  NEEDED FOR CHEST PAIN. MAX  OF 3 TABLETS IN 15 MINUTES. CALL 911 IF PAIN PERSISTS.      pravastatin (PRAVACHOL) 20 MG tablet Take 1 tablet (20 mg total) by mouth once daily. 90 tablet 1    spironolactone (ALDACTONE) 25 MG tablet Take 25 mg by mouth.      tiotropium-olodateroL (STIOLTO RESPIMAT) 2.5-2.5 mcg/actuation Mist Inhale 2 puffs into the lungs once daily. Controller 1 g 11    triamcinolone acetonide 0.1% (KENALOG) 0.1 % cream SMARTSI Application Topical 2-3 Times Daily      valsartan (DIOVAN) 80 MG tablet Take 80 mg by mouth.      VIT C/VIT E AC/LUT/COPPER/ZINC (PRESERVISION LUTEIN ORAL) Take by mouth.      warfarin (COUMADIN) 5 MG tablet Take 2 tabs by mouth on Tuesday,Thursday, Saturday and Sundays then 1.5 on all other days.      blood-glucose meter kit To check BG 3 times daily, to use with insurance preferred meter 1 each 0     Current Facility-Administered Medications on File Prior to Visit   Medication Dose Route Frequency Provider Last Rate Last Admin    [DISCONTINUED] hyaluronate sodium, stabilized (DUROLANE) 60 mg/3 mL 60 mg  60 mg Intra-articular  Elda Campbell MD   60 mg at 24 1400       Objective:      /67 (BP Location: Left arm, Patient Position: Sitting, BP Method: Medium (Automatic))   Pulse 76   SpO2 (!) 92%     Exam:  GEN:  Well developed, well nourished.  No acute distress.   HEENT:  No trauma.  Mucous membranes moist.  On O2 via nasal cannula.  PSYCH: Normal affect. Thought content appropriate.  CHEST:  Breathing symmetric.  No audible wheezing.  ABD: Soft, non-distended.  SKIN:  Warm, pink, dry.  No rash on exposed areas.    EXT:  No cyanosis, clubbing, or edema.  No color change or changes in nail or hair  growth.  NEURO/MUSCULOSKELETAL:  Fully alert, oriented, and appropriate. Speech normal keesha. No cranial nerve deficits.   Gait:  Antalgic.  Uses rolling walker for ambulation.     Imaging:  Narrative & Impression    EXAMINATION:  XR HIP WITH PELVIS WHEN PERFORMED, 2 OR 3  VIEWS RIGHT     CLINICAL HISTORY:  Unspecified fall, initial encounter     TECHNIQUE:  AP view of the pelvis and frog leg lateral view of the right hip were performed.     COMPARISON:  Non 09/21/2020 e     FINDINGS:  Mild DJD.  Left hip joint space is slightly narrowed.  No acute fracture or dislocation.  No bone destruction identified.  Vascular calcifications noted.     Impression:     See above        Electronically signed by: Trever Thompson MD  Date:                                            08/11/2023  Time:                                           12:23     Narrative & Impression    EXAMINATION:  XR LUMBAR SPINE AP AND LATERAL     CLINICAL HISTORY:  Unspecified fall, initial encounter     TECHNIQUE:  AP, lateral and spot images were performed of the lumbar spine.     COMPARISON:  10/19/2022 CT lumbar spine none     FINDINGS:  Mild DJD.  There is a grade 1 L4/L5 anterolisthesis.  The L4/L5 disc space is slightly narrowed.  No fracture or dislocation.  No bone destruction identified     Impression:     See above        Electronically signed by: Trever Thompson MD  Date:                                            08/11/2023  Time:                                           12:21     Narrative & Impression    EXAMINATION:  CT LUMBAR SPINE WITHOUT CONTRAST     CLINICAL HISTORY:  Lumbar radiculopathy, no red flags, no prior management;  Radiculopathy, lumbar region     TECHNIQUE:  Low-dose axial, sagittal and coronal reformations are obtained through the lumbar spine.  Contrast was not administered.     COMPARISON:  02/25/2015     FINDINGS:  Alignment: Grade 1 anterolisthesis at L4-L5.     Vertebrae: No fracture. No lytic or blastic lesion.      Discs: Mild disc height loss at L3-L5.     Sacroiliac joints: Mild degenerative changes.     Degenerative findings:     T12-L1: No spinal canal stenosis or neural foraminal narrowing.     L1-L2: No spinal canal stenosis or neural foraminal narrowing.     L2-L3: Circumferential disc bulge and mild facet arthropathy resulting in mild bilateral neural foraminal narrowing.     L3-L4: Circumferential disc bulge and moderate facet arthropathy resulting in moderate to severe spinal canal stenosis and moderate bilateral neural foraminal narrowing.     L4-L5: Circumferential disc bulge and severe facet arthropathy resulting in severe spinal canal stenosis and moderate bilateral neural foraminal narrowing.     L5-S1: Circumferential disc bulge and severe facet arthropathy resulting in mild bilateral neural foraminal narrowing.     Paraspinal muscles & soft tissues: Mild paraspinal muscle atrophy.  Vascular calcifications with partially visualized ectasia of the abdominal aorta and iliac arteries.  Fibrotic changes at the lung bases.     Impression:     1. Multilevel degenerative changes of the lumbar spine detailed above.  Moderate to severe spinal canal stenosis at L4-S1.  Moderate neural foraminal narrowing at L3-L5.  2. 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        Electronically signed by: Yogesh Gamez MD  Date:                                            10/19/2022  Time:                                           09:56       Assessment:       1. DDD (degenerative disc disease), lumbar  DRUGS OF ABUSE SCREEN, BLOOD      2. Lumbar radiculopathy        3. Lumbar spondylosis        4. Spinal stenosis of lumbar region with neurogenic claudication                Plan:       Percy was seen today for follow-up.    Diagnoses and all orders for this visit:    DDD (degenerative disc disease), lumbar  -     DRUGS OF ABUSE SCREEN, BLOOD; Future    Lumbar radiculopathy    Lumbar  spondylosis    Spinal stenosis of lumbar region with neurogenic claudication        Percy Ramirez is a 83 y.o. male with chronic low back and lower extremity pain.  Subjectively symptoms most consistent with neurogenic claudication related to spinal stenosis.  Patient is noted to have multilevel moderate to severe spinal stenosis on lumbar CT scan.    Prior records reviewed.   Continue Norco 7.5-325 mg q.8 hours p.r.n..  Three, one month supplies of 90 pills per month provided today.   Prescription monitoring program reviewed today.  No inconsistencies noted.   Drugs or abuse blood screen ordered today.  We will review results when available.  Opioid risk tool completed.  Low risk.  Pain contract signed on 03/24/2023.  Dr. Santos is the provider of medication management and agrees with the plan of care.   Return to clinic in 3 months or sooner if needed.  At that time we will discuss efficacy of medications and make any necessary adjustments.     Fernando Downs PA-C  [facility]  Interventional Pain Management   05/31/2024    This note was created by combination of typed  and M-Modal dictation.  Transcription and phonetic errors may be present.  If there are any questions, please contact me.

## 2024-06-03 LAB
BRPFT: NORMAL
DLCO ADJ PRE: 5.56 ML/(MIN*MMHG)
DLCO SINGLE BREATH LLN: 16.5
DLCO SINGLE BREATH PRE REF: 23.7 %
DLCO SINGLE BREATH REF: 23.42
DLCOC SBVA LLN: 2.28
DLCOC SBVA PRE REF: 42.8 %
DLCOC SBVA REF: 3.47
DLCOC SINGLE BREATH LLN: 16.5
DLCOC SINGLE BREATH PRE REF: 23.7 %
DLCOC SINGLE BREATH REF: 23.42
DLCOVA LLN: 2.28
DLCOVA PRE REF: 42.8 %
DLCOVA PRE: 1.49 ML/(MIN*MMHG*L)
DLCOVA REF: 3.47
DLVAADJ PRE: 1.49 ML/(MIN*MMHG*L)
ERVN2 LLN: -16449.13
ERVN2 PRE REF: 47.2 %
ERVN2 PRE: 0.41 L
ERVN2 REF: 0.87
FEF 25 75 CHG: -11.3 %
FEF 25 75 LLN: 0.7
FEF 25 75 POST REF: 28.4 %
FEF 25 75 PRE REF: 32 %
FEF 25 75 REF: 1.88
FET100 CHG: 5.5 %
FEV1 CHG: -6.9 %
FEV1 FVC CHG: -2.7 %
FEV1 FVC LLN: 60
FEV1 FVC POST REF: 69.9 %
FEV1 FVC PRE REF: 71.9 %
FEV1 FVC REF: 75
FEV1 LLN: 1.83
FEV1 POST REF: 56.3 %
FEV1 PRE REF: 60.4 %
FEV1 REF: 2.66
FRCN2 LLN: 2.7
FRCN2 PRE REF: 110.4 %
FRCN2 REF: 3.69
FVC CHG: -4.3 %
FVC LLN: 2.58
FVC POST REF: 79.7 %
FVC PRE REF: 83.3 %
FVC REF: 3.59
IVC PRE: 2.62 L
IVC SINGLE BREATH LLN: 2.58
IVC SINGLE BREATH PRE REF: 72.9 %
IVC SINGLE BREATH REF: 3.59
PEF CHG: -0.2 %
PEF LLN: 4.49
PEF POST REF: 58.2 %
PEF PRE REF: 58.3 %
PEF REF: 6.69
POST FEF 25 75: 0.54 L/S
POST FET 100: 11.73 SEC
POST FEV1 FVC: 52.3 %
POST FEV1: 1.5 L
POST FVC: 2.87 L
POST PEF: 3.89 L/S
PRE DLCO: 5.56 ML/(MIN*MMHG)
PRE FEF 25 75: 0.6 L/S
PRE FET 100: 11.11 SEC
PRE FEV1 FVC: 53.77 %
PRE FEV1: 1.61 L
PRE FRC N2: 4.07 L
PRE FVC: 2.99 L
PRE PEF: 3.9 L/S
RVN2 LLN: 2.14
RVN2 PRE REF: 129.9 %
RVN2 PRE: 3.66 L
RVN2 REF: 2.82
RVN2TLCN2 LLN: 36.57
RVN2TLCN2 PRE REF: 126.1 %
RVN2TLCN2 PRE: 57.46 %
RVN2TLCN2 REF: 45.55
TLCN2 LLN: 5.59
TLCN2 PRE REF: 94.5 %
TLCN2 PRE: 6.37 L
TLCN2 REF: 6.74
VA PRE: 3.74 L
VA SINGLE BREATH LLN: 6.59
VA SINGLE BREATH PRE REF: 56.7 %
VA SINGLE BREATH REF: 6.59
VCMAXN2 LLN: 2.58
VCMAXN2 PRE REF: 75.4 %
VCMAXN2 PRE: 2.71 L
VCMAXN2 REF: 3.59

## 2024-06-05 LAB
AMPHETAMINES SERPL QL: NEGATIVE
BARBITURATES SERPL QL SCN: NEGATIVE
BENZODIAZ SERPL QL SCN: NEGATIVE
BZE SERPL QL: NEGATIVE
CARBOXYTHC SERPL QL SCN: NEGATIVE
ETHANOL SERPL QL SCN: NEGATIVE
METHADONE SERPL QL SCN: NEGATIVE
OPIATES SERPL QL SCN: NEGATIVE
PCP SERPL QL SCN: NEGATIVE
PROPOXYPH SERPL QL: NEGATIVE

## 2024-06-18 ENCOUNTER — TELEPHONE (OUTPATIENT)
Dept: FAMILY MEDICINE | Facility: CLINIC | Age: 84
End: 2024-06-18
Payer: MEDICARE

## 2024-06-18 NOTE — TELEPHONE ENCOUNTER
----- Message from Adrianne Carver sent at 6/18/2024 12:39 PM CDT -----  Who called: self        What is the request in detail: pt would like call back from nurse in regards to getting med for sore throat and stopped up head       Can the clinic reply by [facility]? No        Would the patient rather a call back or a response via [facility]? Call back        Best call back number:[phone]

## 2024-06-20 ENCOUNTER — PATIENT OUTREACH (OUTPATIENT)
Dept: ADMINISTRATIVE | Facility: OTHER | Age: 84
End: 2024-06-20
Payer: MEDICARE

## 2024-06-20 ENCOUNTER — HOSPITAL ENCOUNTER (EMERGENCY)
Facility: HOSPITAL | Age: 84
Discharge: HOME | End: 2024-06-20
Attending: EMERGENCY MEDICINE
Payer: MEDICARE

## 2024-06-20 ENCOUNTER — TELEPHONE (OUTPATIENT)
Dept: FAMILY MEDICINE | Facility: CLINIC | Age: 84
End: 2024-06-20
Payer: MEDICARE

## 2024-06-20 VITALS
DIASTOLIC BLOOD PRESSURE: 61 MMHG | RESPIRATION RATE: 24 BRPM | OXYGEN SATURATION: 97 % | TEMPERATURE: 98 F | BODY MASS INDEX: 31.93 KG/M2 | WEIGHT: 210 LBS | HEART RATE: 74 BPM | SYSTOLIC BLOOD PRESSURE: 131 MMHG

## 2024-06-20 DIAGNOSIS — J44.1 COPD EXACERBATION: ICD-10-CM

## 2024-06-20 DIAGNOSIS — Z87.09 HISTORY OF COPD: ICD-10-CM

## 2024-06-20 DIAGNOSIS — Z86.79 HISTORY OF CONGESTIVE HEART FAILURE: Primary | ICD-10-CM

## 2024-06-20 DIAGNOSIS — R06.02 SHORTNESS OF BREATH: ICD-10-CM

## 2024-06-20 LAB
ALBUMIN SERPL BCP-MCNC: 3.2 G/DL (ref 3.5–5.2)
ALP SERPL-CCNC: 107 U/L (ref 55–135)
ALT SERPL W/O P-5'-P-CCNC: 12 U/L (ref 10–44)
ANION GAP SERPL CALC-SCNC: 7 MMOL/L (ref 8–16)
AST SERPL-CCNC: 11 U/L (ref 10–40)
BASOPHILS # BLD AUTO: 0.03 K/UL (ref 0–0.2)
BASOPHILS NFR BLD: 0.5 % (ref 0–1.9)
BILIRUB SERPL-MCNC: 0.6 MG/DL (ref 0.1–1)
BNP SERPL-MCNC: 259 PG/ML (ref 0–99)
BUN SERPL-MCNC: 22 MG/DL (ref 8–23)
CALCIUM SERPL-MCNC: 8.5 MG/DL (ref 8.7–10.5)
CHLORIDE SERPL-SCNC: 99 MMOL/L (ref 95–110)
CO2 SERPL-SCNC: 24 MMOL/L (ref 23–29)
CREAT SERPL-MCNC: 1.7 MG/DL (ref 0.5–1.4)
DIFFERENTIAL METHOD BLD: ABNORMAL
EOSINOPHIL # BLD AUTO: 0.3 K/UL (ref 0–0.5)
EOSINOPHIL NFR BLD: 3.8 % (ref 0–8)
ERYTHROCYTE [DISTWIDTH] IN BLOOD BY AUTOMATED COUNT: 14.5 % (ref 11.5–14.5)
EST. GFR  (NO RACE VARIABLE): 40 ML/MIN/1.73 M^2
GLUCOSE SERPL-MCNC: 401 MG/DL (ref 70–110)
HCT VFR BLD AUTO: 32.3 % (ref 40–54)
HGB BLD-MCNC: 10.3 G/DL (ref 14–18)
IMM GRANULOCYTES # BLD AUTO: 0.02 K/UL (ref 0–0.04)
IMM GRANULOCYTES NFR BLD AUTO: 0.3 % (ref 0–0.5)
INR PPP: 2.1 (ref 0.8–1.2)
LYMPHOCYTES # BLD AUTO: 0.6 K/UL (ref 1–4.8)
LYMPHOCYTES NFR BLD: 9.7 % (ref 18–48)
MAGNESIUM SERPL-MCNC: 2 MG/DL (ref 1.6–2.6)
MCH RBC QN AUTO: 28.2 PG (ref 27–31)
MCHC RBC AUTO-ENTMCNC: 31.9 G/DL (ref 32–36)
MCV RBC AUTO: 89 FL (ref 82–98)
MONOCYTES # BLD AUTO: 1.1 K/UL (ref 0.3–1)
MONOCYTES NFR BLD: 16.3 % (ref 4–15)
NEUTROPHILS # BLD AUTO: 4.6 K/UL (ref 1.8–7.7)
NEUTROPHILS NFR BLD: 69.4 % (ref 38–73)
NRBC BLD-RTO: 0 /100 WBC
OHS QRS DURATION: 110 MS
OHS QTC CALCULATION: 430 MS
PLATELET # BLD AUTO: 152 K/UL (ref 150–450)
PMV BLD AUTO: 10.5 FL (ref 9.2–12.9)
POTASSIUM SERPL-SCNC: 5.3 MMOL/L (ref 3.5–5.1)
PROT SERPL-MCNC: 6.7 G/DL (ref 6–8.4)
PROTHROMBIN TIME: 21.5 SEC (ref 9–12.5)
RBC # BLD AUTO: 3.65 M/UL (ref 4.6–6.2)
SODIUM SERPL-SCNC: 130 MMOL/L (ref 136–145)
TROPONIN I SERPL DL<=0.01 NG/ML-MCNC: 0.01 NG/ML (ref 0–0.03)
WBC # BLD AUTO: 6.61 K/UL (ref 3.9–12.7)

## 2024-06-20 PROCEDURE — 93010 ELECTROCARDIOGRAM REPORT: CPT | Mod: ,,, | Performed by: INTERNAL MEDICINE

## 2024-06-20 PROCEDURE — 63600175 PHARM REV CODE 636 W HCPCS: Performed by: EMERGENCY MEDICINE

## 2024-06-20 PROCEDURE — 96374 THER/PROPH/DIAG INJ IV PUSH: CPT

## 2024-06-20 PROCEDURE — 83880 ASSAY OF NATRIURETIC PEPTIDE: CPT | Performed by: EMERGENCY MEDICINE

## 2024-06-20 PROCEDURE — 94640 AIRWAY INHALATION TREATMENT: CPT

## 2024-06-20 PROCEDURE — 99285 EMERGENCY DEPT VISIT HI MDM: CPT | Mod: 25

## 2024-06-20 PROCEDURE — 94761 N-INVAS EAR/PLS OXIMETRY MLT: CPT

## 2024-06-20 PROCEDURE — 85610 PROTHROMBIN TIME: CPT | Performed by: EMERGENCY MEDICINE

## 2024-06-20 PROCEDURE — 83735 ASSAY OF MAGNESIUM: CPT | Performed by: EMERGENCY MEDICINE

## 2024-06-20 PROCEDURE — 27000221 HC OXYGEN, UP TO 24 HOURS

## 2024-06-20 PROCEDURE — 25000242 PHARM REV CODE 250 ALT 637 W/ HCPCS: Performed by: EMERGENCY MEDICINE

## 2024-06-20 PROCEDURE — 80053 COMPREHEN METABOLIC PANEL: CPT | Performed by: EMERGENCY MEDICINE

## 2024-06-20 PROCEDURE — 84484 ASSAY OF TROPONIN QUANT: CPT | Performed by: EMERGENCY MEDICINE

## 2024-06-20 PROCEDURE — 85025 COMPLETE CBC W/AUTO DIFF WBC: CPT | Performed by: EMERGENCY MEDICINE

## 2024-06-20 PROCEDURE — 93005 ELECTROCARDIOGRAM TRACING: CPT

## 2024-06-20 RX ORDER — ALBUTEROL SULFATE 90 UG/1
AEROSOL, METERED RESPIRATORY (INHALATION)
Qty: 8 G | Refills: 3 | Status: ON HOLD | OUTPATIENT
Start: 2024-06-20

## 2024-06-20 RX ORDER — ALBUTEROL SULFATE 2.5 MG/.5ML
2.5 SOLUTION RESPIRATORY (INHALATION)
Qty: 90 EACH | Refills: 1 | Status: ON HOLD | OUTPATIENT
Start: 2024-06-20 | End: 2024-08-19

## 2024-06-20 RX ORDER — FUROSEMIDE 10 MG/ML
80 INJECTION INTRAMUSCULAR; INTRAVENOUS
Status: COMPLETED | OUTPATIENT
Start: 2024-06-20 | End: 2024-06-20

## 2024-06-20 RX ORDER — ALBUTEROL SULFATE 2.5 MG/.5ML
5 SOLUTION RESPIRATORY (INHALATION)
Status: COMPLETED | OUTPATIENT
Start: 2024-06-20 | End: 2024-06-20

## 2024-06-20 RX ADMIN — FUROSEMIDE 80 MG: 10 INJECTION, SOLUTION INTRAVENOUS at 07:06

## 2024-06-20 RX ADMIN — ALBUTEROL SULFATE 5 MG: 2.5 SOLUTION RESPIRATORY (INHALATION) at 08:06

## 2024-06-20 NOTE — DISCHARGE INSTRUCTIONS
Please follow-up with your primary care doctor in 1 week.  Please use albuterol.  Please continue your usual medicines.  Return immediately if you get worse or if new problems develop.  Please wear your oxygen at home.  Low-sodium diet.  No extra fluids.  Rest.

## 2024-06-20 NOTE — ED PROVIDER NOTES
"Encounter Date: 6/20/2024    SCRIBE #1 NOTE: I, Leenaross Coats, am scribing for, and in the presence of,  Trever Oglesby MD. I have scribed the following portions of the note - Other sections scribed: HPI, ROS.       History     Chief Complaint   Patient presents with    Shortness of Breath     Pt reports to the ED BIB W EMS with C/O SOB and wheezing that started x 3 days but has increased. Pt reports "I felt like I had fluid in my lungs and it was hard to breathe." Per EMS on arrival pt was sating 88% on RA. Pt was placed on nannette neb and sats increased to 99%. Pt sating in the high 90's on 4 L home O2. Pt with some crackles in the lower lungs per EMS. Pt AAOx4. RESP easy and unlabored at rest. Pt placed in SHONNA bed for eval.      This 83 y.o male, with a medical history of Arthritis, CAD (multiple vessel), Hyperlipidemia, Hypertension, Macular degeneration, S/P CABG x 2, and Type 2 diabetes mellitus with circulatory disorder, presents to the ED via EMS transportation c/o an acute non-productive cough for the last 3x days. Pt reports also experiencing chronic shortness of breath, noting that he usually begins to feel short of breath when walking across the [place]. He states that he typically sleeps with 2 pillows at home, but had to sleep in his chair last night due to the symptoms. Pt notes that he began to hear a rattling sound in his chest at 3:30 am this morning and subsequently put on his CPAP machine. He states, however, that he later took off the CPAP as the rattling continued. EMS reports that pt had an oxygen saturation of 88% on room air upon their arrival to the scene. He typically is on 4 liters of O2 at home, but was not using it at the time EMS arrived. EMS reports giving pt a breathing treatment and placed him on 4 liters of O2 with improvement in his oxygen saturation (high 90%'s). Pt reports that he currently continues to feel short of breath and adds that he still hears the rattling in his chest. He " adds that his legs are more swollen than usual. Pt denies abdominal pain, abdominal distention, chest pain, fever, or chills. No other associated symptoms. No alleviating factors.     The history is provided by the patient.     Review of patient's allergies indicates:   Allergen Reactions    Aricept odt [donepezil] Diarrhea and Nausea And Vomiting    Codeine Nausea Only     weak    Ranexa [ranolazine]      Past Medical History:   Diagnosis Date    Allergy     Arthritis     CAD, multiple vessel     DR Melissa    Cataract     Depression     History of colon polyps     Hyperlipidemia     Hypertension     Macular degeneration     Dr Razo for injection, Jennifer for eye    S/P CABG x 2     Type 2 diabetes mellitus with circulatory disorder     Dr. Aquino     Past Surgical History:   Procedure Laterality Date    broken elbow      left    COLONOSCOPY N/A 10/4/2017    Procedure: COLONOSCOPY;  Surgeon: Gabriel Leung MD;  Location: [facility];  Service: Endoscopy;  Laterality: N/A;    EYE SURGERY      cataract    heart bypass      2004    HERNIA REPAIR      right    ROTATOR CUFF REPAIR      right  2004     Family History   Problem Relation Name Age of Onset    Arthritis Mother      Diabetes Mother      Stroke Mother      Heart attack Father      Cancer Brother      Throat cancer Brother      Diabetes Maternal Aunt      Diabetes Maternal Uncle      Heart disease Maternal Uncle      Diabetes Paternal Aunt      Heart disease Paternal Aunt      Heart disease Paternal Uncle      Heart disease Maternal Grandmother      Cancer Maternal Grandfather       Social History     Tobacco Use    Smoking status: Former     Current packs/day: 0.00     Average packs/day: 3.0 packs/day for 45.0 years (135.0 ttl pk-yrs)     Types: Cigarettes     Start date: 1959     Quit date: 2004     Years since quittin.1    Smokeless tobacco: Former   Substance Use Topics    Alcohol use: Yes     Comment: was heavy drinker 35 years ago, rare  use now    Drug use: No     Review of Systems   Constitutional:  Negative for chills and fever.   HENT:  Negative for sore throat.    Eyes:  Negative for pain.   Respiratory:  Positive for cough (non-productive), shortness of breath (chronic) and wheezing (rattling in chest).    Cardiovascular:  Positive for leg swelling (bilateral). Negative for chest pain.   Gastrointestinal:  Negative for abdominal distention, abdominal pain and nausea.   Genitourinary:  Negative for dysuria.   Musculoskeletal:  Negative for back pain.   Skin:  Negative for rash.   Neurological:  Negative for weakness.       Physical Exam     Initial Vitals [06/20/24 0725]   BP Pulse Resp Temp SpO2   116/65 71 20 97.9 °F (36.6 °C) 97 %      MAP       --         Physical Exam  The patient was examined specifically for the following:   General:No significant distress, Good color, Warm and dry. Head and neck:Scalp atraumatic, Neck supple. Neurological:Appropriate conversation, Gross motor deficits. Eyes:Conjugate gaze, Clear corneas. ENT: No epistaxis. Cardiac: Regular rate and rhythm, Grossly normal heart tones. Pulmonary: Wheezing, Rales. Gastrointestinal: Abdominal tenderness, Abdominal distention. Musculoskeletal: Extremity deformity, Apparent pain with range of motion of the joints. Skin: Rash.   The findings on examination were normal except for the following:  The patient has bilateral wheezing.  There is no clinical evidence of respiratory distress.  Oxygen saturations are 97% on 4 L nasal cannula the patient's home oxygen.  The patient has a generous abdomen.  There is mild-to-moderate edema that is symmetrical in both ankles.  The patient has pink.  He is alert and conversational.  Has a contusion on the the left maxillary face.  ED Course   Procedures  Labs Reviewed   COMPREHENSIVE METABOLIC PANEL - Abnormal; Notable for the following components:       Result Value    Sodium 130 (*)     Potassium 5.3 (*)     Glucose 401 (*)     Creatinine  1.7 (*)     Calcium 8.5 (*)     Albumin 3.2 (*)     eGFR 40 (*)     Anion Gap 7 (*)     All other components within normal limits   CBC W/ AUTO DIFFERENTIAL - Abnormal; Notable for the following components:    RBC 3.65 (*)     Hemoglobin 10.3 (*)     Hematocrit 32.3 (*)     MCHC 31.9 (*)     Lymph # 0.6 (*)     Mono # 1.1 (*)     Lymph % 9.7 (*)     Mono % 16.3 (*)     All other components within normal limits   B-TYPE NATRIURETIC PEPTIDE - Abnormal; Notable for the following components:     (*)     All other components within normal limits   PROTIME-INR - Abnormal; Notable for the following components:    Prothrombin Time 21.5 (*)     INR 2.1 (*)     All other components within normal limits   TROPONIN I   MAGNESIUM     EKG Readings: (Independently Interpreted)   This patient is in atrial fibrillation with low voltage QRS complexes.  There is poor R-wave progression across precordium.  The patient has an intraventricular conduction delay.  There is left axis deviation.  There are nonspecific ST segment and T-wave changes.  There is no definite evidence of acute myocardial infarction or malignant arrhythmia.  This is doctor Oglesby dictating an I independently interpreted this EKG.       Imaging Results              X-Ray Chest AP Portable (Final result)  Result time 06/20/24 08:32:31      Final result by Abraham Shepherd MD (06/20/24 08:32:31)                   Impression:      As above      Electronically signed by: Abraham Shepherd MD  Date:    06/20/2024  Time:    08:32               Narrative:    EXAMINATION:  XR CHEST AP PORTABLE    CLINICAL HISTORY:  chest pain;    TECHNIQUE:  AP portable radiograph of the chest was performed.    COMPARISON:  Radiograph 08/26/2021, CT 08/29/2022    FINDINGS:  Multiple overlying cardiac monitoring leads.  Prior median sternotomy for apparent cardiac revascularization.  The cardiomediastinal silhouette is borderline enlarged, similar to priors..  Pulmonary vascularity  appears within normal limits.    Linear and reticular opacities in the mid to lower lung zones in keeping with chronic interstitial changes.  This appears similar to prior, without definite new focal airspace opacity identified.  No significant pleural fluid or pneumothorax.                                       Medications   furosemide injection 80 mg (80 mg Intravenous Given 6/20/24 1227)   albuterol sulfate nebulizer solution 5 mg (5 mg Nebulization Given 6/20/24 3134)     Medical Decision Making  Amount and/or Complexity of Data Reviewed  Labs: ordered.  Radiology: ordered.    Risk  Prescription drug management.    Given the above, this patient presents emergency room concerned about hearing or rattle in his chest.  The patient has chronic shortness of breath, chronic COPD, history of congestive heart failure.  Evaluation today does not identify pneumonia pneumothorax pleural effusion or pulmonary edema on chest x-ray.  BNP is normal at 259.  The INR is 2.1 acceptable for atrial fibrillation.  The patient has a mild chronic anemia with a hemoglobin of 10.  The potassium is borderline high at 5.3 this is not clinically significant.  The patient has some minimal renal insufficiency.  The troponin is 0.013.  This is normal.  I doubt myocardial infarction exacerbation of congestive heart failure.  The patient does have bilateral wheezing.  There is no clinical evidence of respiratory distress.  I will discharge to outpatient evaluation and treatment.  The patient is diabetic.  I will avoid oral steroids.         Scribe Attestation:   Scribe #1: I performed the above scribed service and the documentation accurately describes the services I performed. I attest to the accuracy of the note.                         Please note that the documentation on this chart was provided by the scribe above on the date of service noted above, and that the documentation in the chart accurately reflects the work and decisions made by  me alone.  Signed, Dr. Oglesby      Clinical Impression:  Final diagnoses:  [R06.02] Shortness of breath  [Z86.79] History of congestive heart failure (Primary)  [Z87.09] History of COPD          ED Disposition Condition    Discharge Stable          ED Prescriptions       Medication Sig Dispense Start Date End Date Auth. Provider    albuterol sulfate 2.5 mg/0.5 mL Nebu Take 2.5 mg by nebulization every 6 (six) hours while awake. Rescue 90 each 6/20/2024 8/19/2024 Trever Oglesby MD    albuterol (PROVENTIL/VENTOLIN HFA) 90 mcg/actuation inhaler INHALE 2 PUFFS BY MOUTH EVERY 6 HOURS AS NEEDED FOR WHEEZING OR  SHORTNESS  OF  BREATH 8 g 6/20/2024 -- Trever Oglesby MD          Follow-up Information       Follow up With Specialties Details Why Contact Info    Kasie Edmondson MD Internal Medicine, Pediatrics In 1 week  [address] [facility]  [place] [address]  [phone]               Trever Oglesby MD  06/20/24 0927

## 2024-06-20 NOTE — PROGRESS NOTES
CHW - Initial Contact    This Community Health Worker completed the Social Determinant of Health questionnaire with patient  at bedside  today.    Pt identified barriers of most importance are: has no needs at this time.   Referrals to community agencies completed with patient/caregiver consent outside of [facility] include: no: none at this time.  Referrals were put through [facility] - no: none at this time.  Support and Services: has no support at this time.  Other information discussed the patient needs / wants help with: Completed SDOH with patient at bedside today, Pt has no needs at this time. Will follow up in one week to check pt's future needs.    Follow up required: yes.  Follow-up Outreach - Due: 6/26/2024

## 2024-06-20 NOTE — ED NOTES
Two patient identifiers have been checked and are correct.      Appearance: Pt awake, alert & oriented to person, place & time. Pt in no acute distress at present time. Pt is clean and well groomed with clothes appropriately fastened.   Skin: Skin warm, dry & intact. Color consistent with ethnicity. Mucous membranes moist. No breakdown or brusing noted.   Musculoskeletal: Patient moving all extremities well, no obvious swelling or deformities noted.   Respiratory: Respirations spontaneous, even, and non-labored. Visible chest rise noted. Airway is open and patent. No accessory muscle use noted. Wheezing noted bilaterally.   Neurologic: Sensation is intact. Speech is clear and appropriate. Eyes open spontaneously, behavior appropriate to situation, follows commands, facial expression symmetrical, bilateral hand grasp equal and even, purposeful motor response noted.  Cardiac: All peripheral pulses present. Bilateral lower extremity edema. Cap refill is <3 seconds.  Abdomen: Abdomen soft, non-tender to palpation.   : Pt reports no dysuria or hematuria.

## 2024-06-21 ENCOUNTER — TELEPHONE (OUTPATIENT)
Dept: FAMILY MEDICINE | Facility: CLINIC | Age: 84
End: 2024-06-21
Payer: MEDICARE

## 2024-06-21 ENCOUNTER — PATIENT OUTREACH (OUTPATIENT)
Dept: EMERGENCY MEDICINE | Facility: HOSPITAL | Age: 84
End: 2024-06-21
Payer: MEDICARE

## 2024-06-21 NOTE — PROGRESS NOTES
Patient was seen in the ED on 6/20/24. Phoned patient to assist with Post ED Discharge Navigation. Patient agreed to assistance scheduling a PCP follow-up appointment. In Basket message sent to PCP staff for scheduling assistance.  Lilian Carrion

## 2024-06-21 NOTE — TELEPHONE ENCOUNTER
----- Message from Lilian Carrion sent at 6/21/2024 12:44 PM CDT -----  Regarding: ED F/U  Good afternoon,  The above named patient was seen in the ED on 6/20/24. In compliance with Medicare 2+ Chronic Condition patient measure mandates, this patient requires a follow up appt within 7 days of ED visit d/c date. I am requesting scheduling assistance as you are booked, and I am unable to schedule pt an appt within 7 days. Please assist with scheduling.  Lilian Carrion

## 2024-06-24 ENCOUNTER — OFFICE VISIT (OUTPATIENT)
Dept: NEUROLOGY | Facility: CLINIC | Age: 84
End: 2024-06-24
Payer: MEDICARE

## 2024-06-24 ENCOUNTER — HOSPITAL ENCOUNTER (INPATIENT)
Facility: HOSPITAL | Age: 84
LOS: 5 days | Discharge: HOME HEALTH | DRG: 871 | End: 2024-06-30
Attending: STUDENT IN AN ORGANIZED HEALTH CARE EDUCATION/TRAINING PROGRAM | Admitting: INTERNAL MEDICINE
Payer: MEDICARE

## 2024-06-24 VITALS
SYSTOLIC BLOOD PRESSURE: 97 MMHG | DIASTOLIC BLOOD PRESSURE: 63 MMHG | HEIGHT: 68 IN | BODY MASS INDEX: 30.8 KG/M2 | HEART RATE: 59 BPM | RESPIRATION RATE: 16 BRPM | TEMPERATURE: 98 F | WEIGHT: 203.25 LBS

## 2024-06-24 DIAGNOSIS — J18.9 SEPSIS DUE TO PNEUMONIA: ICD-10-CM

## 2024-06-24 DIAGNOSIS — A41.9 SEPSIS DUE TO PNEUMONIA: ICD-10-CM

## 2024-06-24 DIAGNOSIS — H93.8X3 EAR PRESSURE, BILATERAL: ICD-10-CM

## 2024-06-24 DIAGNOSIS — J44.89 COPD WITH CHRONIC BRONCHITIS AND EMPHYSEMA: ICD-10-CM

## 2024-06-24 DIAGNOSIS — I50.9 CHF (CONGESTIVE HEART FAILURE): ICD-10-CM

## 2024-06-24 DIAGNOSIS — J43.9 COPD WITH CHRONIC BRONCHITIS AND EMPHYSEMA: ICD-10-CM

## 2024-06-24 DIAGNOSIS — A41.9 SEPSIS: ICD-10-CM

## 2024-06-24 DIAGNOSIS — J84.9 ILD (INTERSTITIAL LUNG DISEASE): ICD-10-CM

## 2024-06-24 DIAGNOSIS — I50.42 CHRONIC COMBINED SYSTOLIC AND DIASTOLIC HEART FAILURE: Primary | ICD-10-CM

## 2024-06-24 DIAGNOSIS — R42 DIZZINESS AND GIDDINESS: Primary | ICD-10-CM

## 2024-06-24 LAB
ALBUMIN SERPL BCP-MCNC: 3.2 G/DL (ref 3.5–5.2)
ALLENS TEST: NORMAL
ALP SERPL-CCNC: 85 U/L (ref 55–135)
ALT SERPL W/O P-5'-P-CCNC: 9 U/L (ref 10–44)
ANION GAP SERPL CALC-SCNC: 11 MMOL/L (ref 8–16)
AST SERPL-CCNC: 13 U/L (ref 10–40)
BASOPHILS # BLD AUTO: 0.03 K/UL (ref 0–0.2)
BASOPHILS NFR BLD: 0.3 % (ref 0–1.9)
BILIRUB SERPL-MCNC: 0.6 MG/DL (ref 0.1–1)
BUN SERPL-MCNC: 32 MG/DL (ref 8–23)
CALCIUM SERPL-MCNC: 8.8 MG/DL (ref 8.7–10.5)
CHLORIDE SERPL-SCNC: 99 MMOL/L (ref 95–110)
CO2 SERPL-SCNC: 23 MMOL/L (ref 23–29)
CREAT SERPL-MCNC: 1.9 MG/DL (ref 0.5–1.4)
CRP SERPL-MCNC: 61.5 MG/L (ref 0–8.2)
CTP QC/QA: YES
CTP QC/QA: YES
DELSYS: NORMAL
DIFFERENTIAL METHOD BLD: ABNORMAL
EOSINOPHIL # BLD AUTO: 0.1 K/UL (ref 0–0.5)
EOSINOPHIL NFR BLD: 0.5 % (ref 0–8)
ERYTHROCYTE [DISTWIDTH] IN BLOOD BY AUTOMATED COUNT: 14.4 % (ref 11.5–14.5)
EST. GFR  (NO RACE VARIABLE): 35 ML/MIN/1.73 M^2
FLOW: 3.5
GLUCOSE SERPL-MCNC: 198 MG/DL (ref 70–110)
HCT VFR BLD AUTO: 34.9 % (ref 40–54)
HGB BLD-MCNC: 11.4 G/DL (ref 14–18)
IMM GRANULOCYTES # BLD AUTO: 0.05 K/UL (ref 0–0.04)
IMM GRANULOCYTES NFR BLD AUTO: 0.5 % (ref 0–0.5)
LDH SERPL L TO P-CCNC: 0.5 MMOL/L (ref 0.5–2.2)
LYMPHOCYTES # BLD AUTO: 0.8 K/UL (ref 1–4.8)
LYMPHOCYTES NFR BLD: 7.8 % (ref 18–48)
MCH RBC QN AUTO: 28.9 PG (ref 27–31)
MCHC RBC AUTO-ENTMCNC: 32.7 G/DL (ref 32–36)
MCV RBC AUTO: 88 FL (ref 82–98)
MODE: NORMAL
MONOCYTES # BLD AUTO: 1.2 K/UL (ref 0.3–1)
MONOCYTES NFR BLD: 11.7 % (ref 4–15)
NEUTROPHILS # BLD AUTO: 8 K/UL (ref 1.8–7.7)
NEUTROPHILS NFR BLD: 79.2 % (ref 38–73)
NRBC BLD-RTO: 0 /100 WBC
PLATELET # BLD AUTO: 181 K/UL (ref 150–450)
PMV BLD AUTO: 10.2 FL (ref 9.2–12.9)
POC MOLECULAR INFLUENZA A AGN: NEGATIVE
POC MOLECULAR INFLUENZA B AGN: NEGATIVE
POCT GLUCOSE: 193 MG/DL (ref 70–110)
POTASSIUM SERPL-SCNC: 5.8 MMOL/L (ref 3.5–5.1)
PROT SERPL-MCNC: 7.1 G/DL (ref 6–8.4)
RBC # BLD AUTO: 3.95 M/UL (ref 4.6–6.2)
SAMPLE: NORMAL
SARS-COV-2 RDRP RESP QL NAA+PROBE: NEGATIVE
SITE: NORMAL
SODIUM SERPL-SCNC: 133 MMOL/L (ref 136–145)
SP02: 97
WBC # BLD AUTO: 10.04 K/UL (ref 3.9–12.7)

## 2024-06-24 PROCEDURE — 25000003 PHARM REV CODE 250: Performed by: STUDENT IN AN ORGANIZED HEALTH CARE EDUCATION/TRAINING PROGRAM

## 2024-06-24 PROCEDURE — 82962 GLUCOSE BLOOD TEST: CPT

## 2024-06-24 PROCEDURE — 3074F SYST BP LT 130 MM HG: CPT | Mod: CPTII,S$GLB,, | Performed by: STUDENT IN AN ORGANIZED HEALTH CARE EDUCATION/TRAINING PROGRAM

## 2024-06-24 PROCEDURE — 85025 COMPLETE CBC W/AUTO DIFF WBC: CPT | Performed by: STUDENT IN AN ORGANIZED HEALTH CARE EDUCATION/TRAINING PROGRAM

## 2024-06-24 PROCEDURE — 82803 BLOOD GASES ANY COMBINATION: CPT

## 2024-06-24 PROCEDURE — 96375 TX/PRO/DX INJ NEW DRUG ADDON: CPT

## 2024-06-24 PROCEDURE — 85610 PROTHROMBIN TIME: CPT | Performed by: STUDENT IN AN ORGANIZED HEALTH CARE EDUCATION/TRAINING PROGRAM

## 2024-06-24 PROCEDURE — 3078F DIAST BP <80 MM HG: CPT | Mod: CPTII,S$GLB,, | Performed by: STUDENT IN AN ORGANIZED HEALTH CARE EDUCATION/TRAINING PROGRAM

## 2024-06-24 PROCEDURE — 87635 SARS-COV-2 COVID-19 AMP PRB: CPT | Performed by: STUDENT IN AN ORGANIZED HEALTH CARE EDUCATION/TRAINING PROGRAM

## 2024-06-24 PROCEDURE — 3288F FALL RISK ASSESSMENT DOCD: CPT | Mod: CPTII,S$GLB,, | Performed by: STUDENT IN AN ORGANIZED HEALTH CARE EDUCATION/TRAINING PROGRAM

## 2024-06-24 PROCEDURE — 63600175 PHARM REV CODE 636 W HCPCS: Performed by: STUDENT IN AN ORGANIZED HEALTH CARE EDUCATION/TRAINING PROGRAM

## 2024-06-24 PROCEDURE — 87502 INFLUENZA DNA AMP PROBE: CPT

## 2024-06-24 PROCEDURE — 96365 THER/PROPH/DIAG IV INF INIT: CPT | Mod: 59

## 2024-06-24 PROCEDURE — 80053 COMPREHEN METABOLIC PANEL: CPT | Performed by: STUDENT IN AN ORGANIZED HEALTH CARE EDUCATION/TRAINING PROGRAM

## 2024-06-24 PROCEDURE — 84484 ASSAY OF TROPONIN QUANT: CPT | Performed by: STUDENT IN AN ORGANIZED HEALTH CARE EDUCATION/TRAINING PROGRAM

## 2024-06-24 PROCEDURE — 83880 ASSAY OF NATRIURETIC PEPTIDE: CPT | Performed by: STUDENT IN AN ORGANIZED HEALTH CARE EDUCATION/TRAINING PROGRAM

## 2024-06-24 PROCEDURE — 1157F ADVNC CARE PLAN IN RCRD: CPT | Mod: CPTII,S$GLB,, | Performed by: STUDENT IN AN ORGANIZED HEALTH CARE EDUCATION/TRAINING PROGRAM

## 2024-06-24 PROCEDURE — 1159F MED LIST DOCD IN RCRD: CPT | Mod: CPTII,S$GLB,, | Performed by: STUDENT IN AN ORGANIZED HEALTH CARE EDUCATION/TRAINING PROGRAM

## 2024-06-24 PROCEDURE — 1125F AMNT PAIN NOTED PAIN PRSNT: CPT | Mod: CPTII,S$GLB,, | Performed by: STUDENT IN AN ORGANIZED HEALTH CARE EDUCATION/TRAINING PROGRAM

## 2024-06-24 PROCEDURE — 99214 OFFICE O/P EST MOD 30 MIN: CPT | Mod: S$GLB,,, | Performed by: STUDENT IN AN ORGANIZED HEALTH CARE EDUCATION/TRAINING PROGRAM

## 2024-06-24 PROCEDURE — 99999 PR PBB SHADOW E&M-EST. PATIENT-LVL V: CPT | Mod: PBBFAC,,, | Performed by: STUDENT IN AN ORGANIZED HEALTH CARE EDUCATION/TRAINING PROGRAM

## 2024-06-24 PROCEDURE — 99285 EMERGENCY DEPT VISIT HI MDM: CPT | Mod: 25

## 2024-06-24 PROCEDURE — 83605 ASSAY OF LACTIC ACID: CPT

## 2024-06-24 PROCEDURE — 84145 PROCALCITONIN (PCT): CPT | Performed by: STUDENT IN AN ORGANIZED HEALTH CARE EDUCATION/TRAINING PROGRAM

## 2024-06-24 PROCEDURE — 87040 BLOOD CULTURE FOR BACTERIA: CPT | Mod: 59 | Performed by: STUDENT IN AN ORGANIZED HEALTH CARE EDUCATION/TRAINING PROGRAM

## 2024-06-24 PROCEDURE — 81000 URINALYSIS NONAUTO W/SCOPE: CPT | Performed by: STUDENT IN AN ORGANIZED HEALTH CARE EDUCATION/TRAINING PROGRAM

## 2024-06-24 PROCEDURE — 99900035 HC TECH TIME PER 15 MIN (STAT)

## 2024-06-24 PROCEDURE — 86140 C-REACTIVE PROTEIN: CPT | Performed by: STUDENT IN AN ORGANIZED HEALTH CARE EDUCATION/TRAINING PROGRAM

## 2024-06-24 PROCEDURE — 1101F PT FALLS ASSESS-DOCD LE1/YR: CPT | Mod: CPTII,S$GLB,, | Performed by: STUDENT IN AN ORGANIZED HEALTH CARE EDUCATION/TRAINING PROGRAM

## 2024-06-24 RX ORDER — ACETAMINOPHEN 500 MG
1000 TABLET ORAL
Status: COMPLETED | OUTPATIENT
Start: 2024-06-24 | End: 2024-06-24

## 2024-06-24 RX ADMIN — SODIUM CHLORIDE 1000 ML: 9 INJECTION, SOLUTION INTRAVENOUS at 11:06

## 2024-06-24 RX ADMIN — ACETAMINOPHEN 1000 MG: 500 TABLET ORAL at 10:06

## 2024-06-24 RX ADMIN — PIPERACILLIN AND TAZOBACTAM 4.5 G: 4; .5 INJECTION, POWDER, LYOPHILIZED, FOR SOLUTION INTRAVENOUS; PARENTERAL at 11:06

## 2024-06-24 NOTE — PROGRESS NOTES
Chief Complaint      Checkup visit    History of Present Illness     Percy Ramirez is a 83 y.o. male with a history of multiple medical diagnoses as listed below that presents for vertigo/dizziness, states has seen multiple specialists including his ophthalmologist and associated with macular degeneration. Describes it as when he looks at objects, he feels like a boat is rocking. Denies ear ringing, has been on meclizine to see if any relief.    Patient is on warfarin and plavix. Has CAD, s/p CABG. With bilateral caronary artery disease among his other medical problems.    Interim:  2/19/24 - Patient checking in, continues on warfarin and plavix. Doing well.   Checks warfarin continually. Has not fallen or hit his head.  Sees pain management for back pain. Trigger finger in 3rd digits bilaterally.  Doing well.  No recent illnesses.     Dr Edmondson gives him meclizine for intermittent dizziness. Has seen ENT. On chart review, has taken physical therapy for inner ear and vertigo. Valium worked better for him in the past.    6/24/24 - continues on warfarin and plavix. On hydrocodone for back and leg pain. Recent ER visit for increasing SOB. Discharged from ER, K borderline high. No respiratory distress.     Review of patient's allergies indicates:   Allergen Reactions    Aricept odt [donepezil] Diarrhea and Nausea And Vomiting    Codeine Nausea Only     weak    Ranexa [ranolazine]      Current Outpatient Medications   Medication Sig Dispense Refill    acetaminophen (TYLENOL) 325 MG tablet Take 2 tablets (650 mg total) by mouth every 6 (six) hours as needed. 13 tablet 0    albuterol (PROVENTIL/VENTOLIN HFA) 90 mcg/actuation inhaler INHALE 2 PUFFS BY MOUTH EVERY 6 HOURS AS NEEDED FOR WHEEZING OR  SHORTNESS  OF  BREATH 8 g 3    albuterol sulfate 2.5 mg/0.5 mL Nebu Take 2.5 mg by nebulization every 6 (six) hours while awake. Rescue 90 each 1    atorvastatin (LIPITOR) 40 MG tablet Take 40 mg by mouth once daily.      BD  "INSULIN PEN NEEDLE UF SHORT 31 gauge x 5/16" Ndle       BD INSULIN SYRINGE ULTRA-FINE 1/2 mL 31 gauge x 15/64" Syrg       blood sugar diagnostic Strp To check BG 3 times daily, to use with insurance preferred meter 200 strip 11    carvedilol (COREG) 25 MG tablet Take 1 tablet (25 mg total) by mouth 2 (two) times daily. 180 tablet 0    ceramides 1,3,6-II (CERAVE DAILY MOISTURIZING) Lotn Apply twice daily to skin 355 mL 1    cinnamon bark 500 mg capsule Take 1,000 mg by mouth once daily.        clopidogreL (PLAVIX) 75 mg tablet Take 75 mg by mouth.      diclofenac sodium (VOLTAREN) 1 % Gel Apply 2 g topically 2 (two) times daily. 100 g 0    diclofenac sodium (VOLTAREN) 1 % Gel Apply 2 g topically 3 (three) times daily. 50 g 0    DULoxetine (CYMBALTA) 60 MG capsule Take 1 capsule (60 mg total) by mouth once daily. 90 capsule 3    ENTRESTO  mg per tablet Take 1 tablet by mouth 2 (two) times daily.      fluticasone propionate (FLONASE) 50 mcg/actuation nasal spray 1 spray (50 mcg total) by Each Nostril route 2 (two) times daily. 18.2 mL 3    furosemide (LASIX) 40 MG tablet Take 40 mg by mouth once daily.      gabapentin (NEURONTIN) 300 MG capsule Take 4 capsules (1,200 mg total) by mouth 2 (two) times daily. 540 capsule 1    glimepiride (AMARYL) 4 MG tablet Take 4 mg by mouth daily with breakfast.       HYDROcodone-acetaminophen (NORCO) 7.5-325 mg per tablet Take 1 tablet by mouth every 8 (eight) hours as needed for Pain. 90 tablet 0    [START ON 7/21/2024] HYDROcodone-acetaminophen (NORCO) 7.5-325 mg per tablet Take 1 tablet by mouth every 8 (eight) hours as needed for Pain. 90 tablet 0    [START ON 8/20/2024] HYDROcodone-acetaminophen (NORCO) 7.5-325 mg per tablet Take 1 tablet by mouth every 8 (eight) hours as needed for Pain. 90 tablet 0    insulin NPH-insulin regular, 70/30, (NOVOLIN 70/30) 100 unit/mL (70-30) injection Inject into the skin. Inject 10 units at breakfast and inject 10 units at night      " "insulin syringe-needle U-100 0.3 mL 31 gauge x 15/64" Syrg USE TO INJECT INSULIN THREE TIMES DAILY      insulin syringe-needle U-100 1/2 mL 31 x 5/16" Syrg       ipratropium (ATROVENT) 42 mcg (0.06 %) nasal spray 2 sprays by Nasal route every 6 (six) hours as needed for Rhinitis (use as needed for runny nose and also use 15 minutes prior to meal). Clean nose with saline prior to use 15 mL 3    isosorbide mononitrate (IMDUR) 30 MG 24 hr tablet Take 30 mg by mouth once daily.      lancets Misc To check BG 3 times daily, to use with insurance preferred meter 200 each 11    LIDOcaine (LIDODERM) 5 % Place 1 patch onto the skin once daily. Remove & Discard patch within 12 hours or as directed by MD 5 patch 1    meclizine (ANTIVERT) 12.5 mg tablet Take 1 tablet (12.5 mg total) by mouth 3 (three) times daily as needed for Dizziness. 90 tablet 0    metFORMIN (GLUCOPHAGE) 500 MG tablet Take 500 mg by mouth 2 (two) times daily.      methocarbamoL (ROBAXIN) 500 MG Tab TAKE 1 TABLET BY MOUTH 4 TIMES DAILY FOR 10 DAYS 60 tablet 0    mupirocin (BACTROBAN) 2 % ointment Apply topically 3 (three) times daily. 1 g 1    nitroGLYCERIN (NITROSTAT) 0.4 MG SL tablet DISSOLVE 1 TABLET UNDER THE TONGUE EVERY 5 MINUTES AS  NEEDED FOR CHEST PAIN. MAX  OF 3 TABLETS IN 15 MINUTES. CALL 911 IF PAIN PERSISTS.      pravastatin (PRAVACHOL) 20 MG tablet Take 1 tablet (20 mg total) by mouth once daily. 90 tablet 1    spironolactone (ALDACTONE) 25 MG tablet Take 25 mg by mouth.      tiotropium-olodateroL (STIOLTO RESPIMAT) 2.5-2.5 mcg/actuation Mist Inhale 2 puffs into the lungs once daily. Controller 1 g 11    triamcinolone acetonide 0.1% (KENALOG) 0.1 % cream SMARTSI Application Topical 2-3 Times Daily      valsartan (DIOVAN) 80 MG tablet Take 80 mg by mouth.      VIT C/VIT E AC/LUT/COPPER/ZINC (PRESERVISION LUTEIN ORAL) Take by mouth.      warfarin (COUMADIN) 5 MG tablet Take 2 tabs by mouth on Tuesday,Thursday, Saturday and Sundays then 1.5 on " all other days.      blood-glucose meter kit To check BG 3 times daily, to use with insurance preferred meter 1 each 0     No current facility-administered medications for this visit.       Medical History     Past Medical History:   Diagnosis Date    Allergy     Arthritis     CAD, multiple vessel     DR Melissa    Cataract     Depression     History of colon polyps     Hyperlipidemia     Hypertension     Macular degeneration     Dr Razo for injection, Hejulia for eye    S/P CABG x 2     Type 2 diabetes mellitus with circulatory disorder     Dr. Aquino     Past Surgical History:   Procedure Laterality Date    broken elbow      left    COLONOSCOPY N/A 10/4/2017    Procedure: COLONOSCOPY;  Surgeon: Gabriel Leung MD;  Location: [facility];  Service: Endoscopy;  Laterality: N/A;    EYE SURGERY      cataract    heart bypass      2004    HERNIA REPAIR      right    ROTATOR CUFF REPAIR      right  2004     Family History   Problem Relation Name Age of Onset    Arthritis Mother      Diabetes Mother      Stroke Mother      Heart attack Father      Cancer Brother      Throat cancer Brother      Diabetes Maternal Aunt      Diabetes Maternal Uncle      Heart disease Maternal Uncle      Diabetes Paternal Aunt      Heart disease Paternal Aunt      Heart disease Paternal Uncle      Heart disease Maternal Grandmother      Cancer Maternal Grandfather       Social History     Socioeconomic History    Marital status:     Number of children: 4    Years of education: GED   Occupational History    Occupation: retired tug    Tobacco Use    Smoking status: Former     Current packs/day: 0.00     Average packs/day: 3.0 packs/day for 45.0 years (135.0 ttl pk-yrs)     Types: Cigarettes     Start date: 1959     Quit date: 2004     Years since quittin.1    Smokeless tobacco: Former   Substance and Sexual Activity    Alcohol use: Yes     Comment: was heavy drinker 35 years ago, rare use now    Drug use: No     Sexual activity: Yes     Partners: Female     Social Determinants of Health     Financial Resource Strain: Low Risk  (6/20/2024)    Overall Financial Resource Strain (CARDIA)     Difficulty of Paying Living Expenses: Not hard at all   Food Insecurity: No Food Insecurity (6/20/2024)    Hunger Vital Sign     Worried About Running Out of Food in the Last Year: Never true     Ran Out of Food in the Last Year: Never true   Transportation Needs: No Transportation Needs (6/20/2024)    PRAPARE - Transportation     Lack of Transportation (Medical): No     Lack of Transportation (Non-Medical): No   Physical Activity: Inactive (6/20/2024)    Exercise Vital Sign     Days of Exercise per Week: 0 days     Minutes of Exercise per Session: 0 min   Stress: No Stress Concern Present (6/20/2024)    [employer] - Occupational Stress Questionnaire     Feeling of Stress : Not at all   Housing Stability: Low Risk  (6/20/2024)    Housing Stability Vital Sign     Unable to Pay for Housing in the Last Year: No     Homeless in the Last Year: No       Exam     Vitals:    06/24/24 0943   BP: 97/63   Pulse: (!) 59   Resp: 16   Temp: 97.9 °F (36.6 °C)       Physical Exam:  General: Not in acute distress. Not ill-appearing.   HENT: Normocephalic and atraumatic. Moist mucous membranes.  Eyes: Conjunctivae normal.   Pulmonary: Pulmonary effort is normal.   Abdominal: Abdomen is soft and flat.   Skin: Skin is warm and dry. No rashes.   Psychiatric: Mood normal.        Neurologic Exam   Mental status: oriented to person, place, and time  Attention: Normal. Concentration: normal.  Speech: speech is normal.  Cranial Nerves: PERRL, EOMI intact, V1-V3 Facial sensation intact. Symmetric facies. Hearing grossly intact. Palate and uvula midline, symmetric. No tongue deviation. Trapezius strength intact.     Motor exam: bulk and tone normal. Strength 5/5 grossly of upper and lower extremities    Reflexes: 2+ in bilateral upper  extremities: biceps and brachiaradialis, 2+ in bilateral lower extremities: patellar     Sensory exam: light touch intact    Gait exam: uses a rolling walker    Labs and Imaging     Labs: reviewed  A1C 10.7, THS wnl, LDL 56    Imaging: personally reviewed  10/2022 CT lumbar spine - multilevel disc degenerative changes, moderate to severe canal stenosis L4-S1, moderate neural foraminal narrowing L3-L5    7/2022- CTH with chronic microvascular changes    Other procedures: reviewed  7/2022 - US carotid bilateral - 50-69% stenosis on L    Assessment and Plan     Problem List Items Addressed This Visit          ENT    Ear pressure, bilateral       Pulmonary    COPD with chronic bronchitis and emphysema    Overview     - Quit smoking since 1960s.   -fev1 52%  -Patient is up to date with vaccination.    -Declines pulmonary rehab  -On home oxygen which helps  -continue with Stiolto  -walker and wheelchair dependant.          ILD (interstitial lung disease)    Overview     -Emphysematous changes on ct along with basilar reticulation.  pft with moderate obstructive and mild restrictive physiology.  dlco significantly reduced (pt on home oxygen)  -Monitor lung function stable fvc and tlc            Other    Dizziness and giddiness - Primary    Overview     Discussed use of meclizine is his primary medication to address vertigo.  Valium was prescribed in the past which worked better per ENT prior notes. Discussed that the medication can and will cause increased balance dysfunction and possible weakness given is nature.  Patient advised that if he uses the medication he should not he is about more than necessary in order to prevent or reduce his risk of falls.  Patient voiced understanding. Does have continued tinnitus in which he has seen ENT for in past for his vertigo. PCP giving meclizine.             Patient doing well. Here to check in. Has multiple vascular risk factors including HTN, DMII, HLD. Asthma.     Was on  "meclizine prior for dizziness - pcp refilled. Multifactorial component with oxygen requirement.   Battery life drains quickly on his portable oxygen, increasing his dizziness.    Worsening diabetes - discussed control w PCP. Also seeing endocrine. Taken off metformin, on insulin. He loves eating cookies.     Patient w hx significant for CAD and CABG x 2, carotid artery stenosis. Also afib. Heart failure. Has macular degeneration in which he feels like "boat is rocking."  Sees optho.     US carotid given one in 7/2022 with L 50-69% stenosis - if worsens could potentially warrant intervention, patient on warfarin and plavix and follows w cardiology. Repeat ultrasound 11/2023 with similar findings: 0-19% stenosis of the R internal carotid stenosis, 50-59% L internal carotid. > 50% L ECA stenosis.   Discussed fall risk precautions.     Chronic back pain - seeing Dr Baker pain management    Questions answered, plans discussed. Multifactorial nature of his symptoms.     Fall risk precautions.     Recent ER visit for SOB, discharged ER. Patient feels he could have had a viral cough.     Follow-up: PRN, patient continuing to followup w PCP for dizziness and on meclizine. Patient feels he has fluid in both ears and when he pops it, feels better. Followup pcp. Multifactorial component, oxygen requirement.  Questions answered.     Time spent on this encounter: 30 minutes. This includes face to face time and non-face to face time preparing to see the patient (eg, review of tests), obtaining and/or reviewing separately obtained history, documenting clinical information in the electronic or other health record, independently interpreting results and communicating results to the patient/family/caregiver, or care coordinator.     "

## 2024-06-25 PROBLEM — J18.9 SEPSIS DUE TO PNEUMONIA: Status: ACTIVE | Noted: 2024-06-25

## 2024-06-25 PROBLEM — R13.10 DYSPHAGIA: Status: ACTIVE | Noted: 2024-06-25

## 2024-06-25 PROBLEM — Z71.89 ADVANCE CARE PLANNING: Status: ACTIVE | Noted: 2024-06-25

## 2024-06-25 PROBLEM — A41.9 SEPSIS DUE TO PNEUMONIA: Status: ACTIVE | Noted: 2024-06-25

## 2024-06-25 PROBLEM — A41.9 SEPSIS: Status: ACTIVE | Noted: 2024-06-25

## 2024-06-25 LAB
BACTERIA #/AREA URNS HPF: NORMAL /HPF
BILIRUB UR QL STRIP: NEGATIVE
BNP SERPL-MCNC: 387 PG/ML (ref 0–99)
CLARITY UR: CLEAR
COLOR UR: YELLOW
GLUCOSE UR QL STRIP: NEGATIVE
HGB UR QL STRIP: NEGATIVE
HYALINE CASTS #/AREA URNS LPF: 0 /LPF
INR PPP: 2 (ref 0.8–1.2)
KETONES UR QL STRIP: NEGATIVE
LACTATE SERPL-SCNC: 1.2 MMOL/L (ref 0.5–2.2)
LEUKOCYTE ESTERASE UR QL STRIP: NEGATIVE
MICROSCOPIC COMMENT: NORMAL
NITRITE UR QL STRIP: NEGATIVE
PH UR STRIP: 6 [PH] (ref 5–8)
PROCALCITONIN SERPL IA-MCNC: 0.1 NG/ML
PROT UR QL STRIP: ABNORMAL
PROTHROMBIN TIME: 20.7 SEC (ref 9–12.5)
RBC #/AREA URNS HPF: 0 /HPF (ref 0–4)
SP GR UR STRIP: 1.02 (ref 1–1.03)
TROPONIN I SERPL DL<=0.01 NG/ML-MCNC: 0.01 NG/ML (ref 0–0.03)
URN SPEC COLLECT METH UR: ABNORMAL
UROBILINOGEN UR STRIP-ACNC: ABNORMAL EU/DL
WBC #/AREA URNS HPF: 0 /HPF (ref 0–5)

## 2024-06-25 PROCEDURE — 96368 THER/DIAG CONCURRENT INF: CPT

## 2024-06-25 PROCEDURE — 21400001 HC TELEMETRY ROOM

## 2024-06-25 PROCEDURE — 96366 THER/PROPH/DIAG IV INF ADDON: CPT

## 2024-06-25 PROCEDURE — 94640 AIRWAY INHALATION TREATMENT: CPT

## 2024-06-25 PROCEDURE — 92610 EVALUATE SWALLOWING FUNCTION: CPT

## 2024-06-25 PROCEDURE — 97110 THERAPEUTIC EXERCISES: CPT

## 2024-06-25 PROCEDURE — 25000003 PHARM REV CODE 250: Performed by: INTERNAL MEDICINE

## 2024-06-25 PROCEDURE — 63600175 PHARM REV CODE 636 W HCPCS: Performed by: STUDENT IN AN ORGANIZED HEALTH CARE EDUCATION/TRAINING PROGRAM

## 2024-06-25 PROCEDURE — 25000003 PHARM REV CODE 250: Performed by: STUDENT IN AN ORGANIZED HEALTH CARE EDUCATION/TRAINING PROGRAM

## 2024-06-25 PROCEDURE — 25000242 PHARM REV CODE 250 ALT 637 W/ HCPCS: Performed by: INTERNAL MEDICINE

## 2024-06-25 PROCEDURE — 83605 ASSAY OF LACTIC ACID: CPT | Performed by: STUDENT IN AN ORGANIZED HEALTH CARE EDUCATION/TRAINING PROGRAM

## 2024-06-25 PROCEDURE — 97166 OT EVAL MOD COMPLEX 45 MIN: CPT

## 2024-06-25 PROCEDURE — 96367 TX/PROPH/DG ADDL SEQ IV INF: CPT

## 2024-06-25 PROCEDURE — 25000003 PHARM REV CODE 250: Performed by: HOSPITALIST

## 2024-06-25 PROCEDURE — 63600175 PHARM REV CODE 636 W HCPCS: Performed by: INTERNAL MEDICINE

## 2024-06-25 PROCEDURE — 27000221 HC OXYGEN, UP TO 24 HOURS

## 2024-06-25 PROCEDURE — 99223 1ST HOSP IP/OBS HIGH 75: CPT | Mod: ,,, | Performed by: INTERNAL MEDICINE

## 2024-06-25 PROCEDURE — 97161 PT EVAL LOW COMPLEX 20 MIN: CPT

## 2024-06-25 PROCEDURE — 97535 SELF CARE MNGMENT TRAINING: CPT

## 2024-06-25 RX ORDER — CALCIUM GLUCONATE 20 MG/ML
1 INJECTION, SOLUTION INTRAVENOUS
Status: COMPLETED | OUTPATIENT
Start: 2024-06-25 | End: 2024-06-25

## 2024-06-25 RX ORDER — IPRATROPIUM BROMIDE AND ALBUTEROL SULFATE 2.5; .5 MG/3ML; MG/3ML
3 SOLUTION RESPIRATORY (INHALATION)
Status: DISCONTINUED | OUTPATIENT
Start: 2024-06-25 | End: 2024-06-30 | Stop reason: HOSPADM

## 2024-06-25 RX ORDER — IPRATROPIUM BROMIDE AND ALBUTEROL SULFATE 2.5; .5 MG/3ML; MG/3ML
3 SOLUTION RESPIRATORY (INHALATION) EVERY 4 HOURS PRN
Status: DISCONTINUED | OUTPATIENT
Start: 2024-06-25 | End: 2024-06-30 | Stop reason: HOSPADM

## 2024-06-25 RX ORDER — CARVEDILOL 12.5 MG/1
25 TABLET ORAL 2 TIMES DAILY
Status: DISCONTINUED | OUTPATIENT
Start: 2024-06-25 | End: 2024-06-25

## 2024-06-25 RX ORDER — DOXYCYCLINE HYCLATE 100 MG
100 TABLET ORAL EVERY 12 HOURS
Status: DISCONTINUED | OUTPATIENT
Start: 2024-06-25 | End: 2024-06-30 | Stop reason: HOSPADM

## 2024-06-25 RX ORDER — HYDROCODONE BITARTRATE AND ACETAMINOPHEN 7.5; 325 MG/1; MG/1
1 TABLET ORAL EVERY 8 HOURS PRN
Status: DISCONTINUED | OUTPATIENT
Start: 2024-06-25 | End: 2024-06-30 | Stop reason: HOSPADM

## 2024-06-25 RX ORDER — FLUTICASONE PROPIONATE 50 MCG
1 SPRAY, SUSPENSION (ML) NASAL 2 TIMES DAILY
Status: DISCONTINUED | OUTPATIENT
Start: 2024-06-25 | End: 2024-06-30 | Stop reason: HOSPADM

## 2024-06-25 RX ORDER — WARFARIN SODIUM 5 MG/1
10 TABLET ORAL
Status: DISCONTINUED | OUTPATIENT
Start: 2024-06-25 | End: 2024-06-30 | Stop reason: HOSPADM

## 2024-06-25 RX ORDER — GABAPENTIN 300 MG/1
900 CAPSULE ORAL 2 TIMES DAILY
Status: DISCONTINUED | OUTPATIENT
Start: 2024-06-25 | End: 2024-06-30 | Stop reason: HOSPADM

## 2024-06-25 RX ORDER — FUROSEMIDE 40 MG/1
40 TABLET ORAL DAILY
Status: DISCONTINUED | OUTPATIENT
Start: 2024-06-25 | End: 2024-06-30 | Stop reason: HOSPADM

## 2024-06-25 RX ORDER — NITROGLYCERIN 0.4 MG/1
0.4 TABLET SUBLINGUAL EVERY 5 MIN PRN
Status: DISCONTINUED | OUTPATIENT
Start: 2024-06-25 | End: 2024-06-30 | Stop reason: HOSPADM

## 2024-06-25 RX ORDER — GUAIFENESIN 100 MG/5ML
200 SOLUTION ORAL EVERY 4 HOURS PRN
Status: DISCONTINUED | OUTPATIENT
Start: 2024-06-25 | End: 2024-06-30 | Stop reason: HOSPADM

## 2024-06-25 RX ORDER — CLOPIDOGREL BISULFATE 75 MG/1
75 TABLET ORAL ONCE
Status: COMPLETED | OUTPATIENT
Start: 2024-06-25 | End: 2024-06-25

## 2024-06-25 RX ORDER — INSULIN ASPART 100 [IU]/ML
5 INJECTION, SUSPENSION SUBCUTANEOUS 2 TIMES DAILY WITH MEALS
Status: DISCONTINUED | OUTPATIENT
Start: 2024-06-25 | End: 2024-06-28

## 2024-06-25 RX ORDER — VALSARTAN 80 MG/1
80 TABLET ORAL DAILY
Status: DISCONTINUED | OUTPATIENT
Start: 2024-06-25 | End: 2024-06-25

## 2024-06-25 RX ORDER — DULOXETIN HYDROCHLORIDE 30 MG/1
60 CAPSULE, DELAYED RELEASE ORAL DAILY
Status: DISCONTINUED | OUTPATIENT
Start: 2024-06-25 | End: 2024-06-30 | Stop reason: HOSPADM

## 2024-06-25 RX ORDER — ATORVASTATIN CALCIUM 40 MG/1
40 TABLET, FILM COATED ORAL NIGHTLY
Status: DISCONTINUED | OUTPATIENT
Start: 2024-06-25 | End: 2024-06-30 | Stop reason: HOSPADM

## 2024-06-25 RX ADMIN — SACUBITRIL AND VALSARTAN 4 TABLET: 24; 26 TABLET, FILM COATED ORAL at 09:06

## 2024-06-25 RX ADMIN — PIPERACILLIN AND TAZOBACTAM 4.5 G: 4; .5 INJECTION, POWDER, LYOPHILIZED, FOR SOLUTION INTRAVENOUS; PARENTERAL at 03:06

## 2024-06-25 RX ADMIN — FLUTICASONE PROPIONATE 50 MCG: 50 SPRAY, METERED NASAL at 09:06

## 2024-06-25 RX ADMIN — CALCIUM GLUCONATE 1 G: 20 INJECTION, SOLUTION INTRAVENOUS at 12:06

## 2024-06-25 RX ADMIN — DOXYCYCLINE HYCLATE 100 MG: 100 TABLET, COATED ORAL at 09:06

## 2024-06-25 RX ADMIN — ATORVASTATIN CALCIUM 40 MG: 40 TABLET, FILM COATED ORAL at 09:06

## 2024-06-25 RX ADMIN — FUROSEMIDE 40 MG: 40 TABLET ORAL at 09:06

## 2024-06-25 RX ADMIN — DEXTROSE MONOHYDRATE 250 ML: 100 INJECTION, SOLUTION INTRAVENOUS at 01:06

## 2024-06-25 RX ADMIN — ATORVASTATIN CALCIUM 40 MG: 40 TABLET, FILM COATED ORAL at 04:06

## 2024-06-25 RX ADMIN — WARFARIN SODIUM 10 MG: 5 TABLET ORAL at 05:06

## 2024-06-25 RX ADMIN — GABAPENTIN 900 MG: 300 CAPSULE ORAL at 09:06

## 2024-06-25 RX ADMIN — PIPERACILLIN AND TAZOBACTAM 4.5 G: 4; .5 INJECTION, POWDER, LYOPHILIZED, FOR SOLUTION INTRAVENOUS; PARENTERAL at 06:06

## 2024-06-25 RX ADMIN — DULOXETINE HYDROCHLORIDE 60 MG: 30 CAPSULE, DELAYED RELEASE ORAL at 09:06

## 2024-06-25 RX ADMIN — VANCOMYCIN HYDROCHLORIDE 1750 MG: 500 INJECTION, POWDER, LYOPHILIZED, FOR SOLUTION INTRAVENOUS at 12:06

## 2024-06-25 RX ADMIN — IPRATROPIUM BROMIDE AND ALBUTEROL SULFATE 3 ML: 2.5; .5 SOLUTION RESPIRATORY (INHALATION) at 01:06

## 2024-06-25 RX ADMIN — IPRATROPIUM BROMIDE AND ALBUTEROL SULFATE 3 ML: 2.5; .5 SOLUTION RESPIRATORY (INHALATION) at 09:06

## 2024-06-25 RX ADMIN — INSULIN ASPART 5 UNITS: 100 INJECTION, SUSPENSION SUBCUTANEOUS at 05:06

## 2024-06-25 RX ADMIN — CLOPIDOGREL BISULFATE 75 MG: 75 TABLET ORAL at 04:06

## 2024-06-25 RX ADMIN — DOXYCYCLINE HYCLATE 100 MG: 100 TABLET, COATED ORAL at 12:06

## 2024-06-25 RX ADMIN — INSULIN HUMAN 5 UNITS: 100 INJECTION, SOLUTION PARENTERAL at 01:06

## 2024-06-25 RX ADMIN — FLUTICASONE PROPIONATE 50 MCG: 50 SPRAY, METERED NASAL at 11:06

## 2024-06-25 RX ADMIN — CARVEDILOL 25 MG: 12.5 TABLET, FILM COATED ORAL at 09:06

## 2024-06-25 RX ADMIN — SODIUM ZIRCONIUM CYCLOSILICATE 10 G: 10 POWDER, FOR SUSPENSION ORAL at 02:06

## 2024-06-25 RX ADMIN — INSULIN ASPART 5 UNITS: 100 INJECTION, SUSPENSION SUBCUTANEOUS at 09:06

## 2024-06-25 NOTE — ASSESSMENT & PLAN NOTE
Creatinine baseline closer to 1.5-1.6.  Currently mildly elevated at 1.9.  Continue to monitor for now especially monitor for bouts of hypotension given sepsis.  Blood pressure medications are resumed but with hold parameters for low normal blood pressure.  Follow up on repeat labs.

## 2024-06-25 NOTE — ED TRIAGE NOTES
Percy Ramirez, a 83 y.o. male presents to the ED w/ complaint of generalized weakness x 1 week. reported a fever yesterday. Rectal temp 102.7, informed MD, initiated sepsis protocol. Pt has poor vision. Cough is frequent and non productive    Triage note:  Chief Complaint   Patient presents with    Fatigue     Pt arrived via ems, pt chief complaint is Weakness. Pt states has been feeling weak for past 2 days and having a fever as well.      Review of patient's allergies indicates:   Allergen Reactions    Aricept odt [donepezil] Diarrhea and Nausea And Vomiting    Codeine Nausea Only     weak    Ranexa [ranolazine]      Past Medical History:   Diagnosis Date    Allergy     Arthritis     CAD, multiple vessel     DR Melissa    Cataract     Depression     History of colon polyps     Hyperlipidemia     Hypertension     Macular degeneration     Dr Razo for injection, Jennifer for eye    S/P CABG x 2     Type 2 diabetes mellitus with circulatory disorder     Dr. Aquino

## 2024-06-25 NOTE — PT/OT/SLP EVAL
Occupational Therapy Evaluation     Name: Percy Ramirez  MRN: [id number]  Admitting Diagnosis: <principal problem not specified>  Recent Surgery: * No surgery found *      Recommendations:     Discharge Recommendations: Moderate Intensity Therapy  Level of Assistance Recommended: 24 hours significant assistance  Discharge Equipment Recommendations: to be determined by next level of care  Barriers to discharge: Other (Comment) (pt. is having difficulty walking long distances with increased SOB and fatigue, not at PLOF)    Assessment:     Percy Ramirez is a 83 y.o. male with a medical diagnosis of <principal problem not specified>. He presents with performance deficits affecting function including weakness, impaired endurance, impaired sensation, impaired self care skills, impaired functional mobility, gait instability, impaired balance, decreased lower extremity function, decreased safety awareness, pain, decreased ROM, impaired coordination, impaired skin, impaired cardiopulmonary response to activity. Patient sitting in chair with purewick attached, but not on penis, so patient had urinated on floor when occupational therapy arrived with soiled linens on chair.     Rehab Prognosis: Good; patient would benefit from acute OT services to address these deficits and reach maximum level of function.    Plan:     Patient to be seen 5 x/week to address the above listed problems via self-care/home management, therapeutic activities, therapeutic exercises  Plan of Care Expires: 07/09/24  Plan of Care Reviewed with: patient, son    Subjective     Chief Complaint: having more difficulty breathing and could not move yesterday   Patient Comments/Goals: patient lives with son at home and wants to return home when able   Pain/Comfort:  Pain Rating 1: 0/10  Pain Rating Post-Intervention 1: 0/10    Patients cultural, spiritual, Zoroastrian conflicts given the current situation: no    Social History:  Living Environment: Patient  lives with their son in a single story home with number of outside stair(s): 1-3 with no concern at entry   Prior Level of Function: Prior to admission, patient was modified independent including cooking  Roles and Routines: Patient was not driving and retired prior to admission (not driving due to visual deficits) .  Equipment Used at Home: rollator, oxygen, shower chair  DME owned (not currently used):  power chair   Assistance Upon Discharge: family    Objective:     Communicated with RNMacie, prior to session. Patient found up in chair with oxygen, PureWick, peripheral IV, telemetry upon OT entry to room.    General Precautions: Standard, fall   Orthopedic Precautions: N/A   Braces: N/A    Respiratory Status: Nasal cannula, flow 4  L/min with oxygen at 94% at end of session after walking to and from bathroom to bed     Occupational Performance    Gait belt applied - Yes    Bed Mobility:   Scooting to HOB in supine: contact guard assistance  Sit to Supine with contact guard assistance on right  side of bed    Functional Mobility/Transfers:  Sit <> Stand Transfer with moderate assistance with rolling walker  Toilet Transfer Step Transfer technique with minimum assistance with rolling walker  Functional Mobility: Patient walked from chair next to bathroom door after occupational therapy rolled him from chair next to bed and walked into the bathroom with RW with min assist with verbal instructions to find toilet due to low vision. He walked from bathroom to bed with RW with CGA.     Activities of Daily Living:  Upper Body Dressing: set up assistance  Toileting: total assistance initially to help clean up urine from chair and floor after patient had toileting accident. He did hygiene himself when he toileted on BSC over toilet in bathroom with setup only to find toilet paper.     Cognitive/Visual Perceptual:  Cognitive/Psychosocial Skills:    -     Oriented to: Person, Place, Time, Situation  -     Follows  "Commands/attention: Follows multistep  commands  -     Communication: clear/fluent  -     Memory: No Deficits noted  -     Safety awareness/insight to disability: impaired  -     Mood/Affect/Coping skills/emotional control: Appropriate to situation  Visual/Perceptual:    -     Impaired : Pt. Is visually impaired with peripheral vision only on right side with reduced color vision and central vision due to macular degeneration to where he cannot see eyes, but can see person's nose and lips if they get within 6" of him.  He has reduced peripheral vision on the left side due to diabetic retinopathy, so can see with central vision, but not on side with left eye. Please verbally instruct him when you enter and exit the room and to where he is going when he is ambulating.       Physical Exam:  Balance:    -     Sitting: contact guard assistance  -     Standing: minimum assistance  Postural examination/scapula alignment:    -       Rounded shoulders  -       Forward head  Skin integrity: Bruising of B arms   Edema:  Mild B lower extremity   Sensation:    -       Intact  Motor Planning: Intact  Dominant hand: Right  Upper Extremity Range of Motion:     -       Right Upper Extremity: WNL  -       Left Upper Extremity: WNL  Upper Extremity Strength:    -       Right Upper Extremity: WNL  -       Left Upper Extremity: WNL   Strength:    -       Right Upper Extremity: WNL  -       Left Upper Extremity: WNL  Fine Motor Coordination:    -       Intact  Gross motor coordination:   WFL    AMPAC 6 Click ADL:  AMPAC Total Score:      Treatment & Education:  Patient educated on role of OT, POC, and goals for therapy  Patient educated on importance of OOB activities with staff member assistance and sitting OOB majority of the day  Patient says he has hand held and electronic magnifiers (CCTV) at home to use, but his 6x magnifier was not at acute rehab today.   He said he uses home oxygen and has a portable oxygen at home     Patient " clear to ambulate to/from bathroom with RN/PCT, assist x1  .    Patient left HOB elevated with all lines intact, call button in reach, RN notified, and son  present.    GOALS:   Multidisciplinary Problems       Occupational Therapy Goals          Problem: Occupational Therapy    Goal Priority Disciplines Outcome Interventions   Occupational Therapy Goal     OT, PT/OT Progressing    Description: Goals to be met by: 07/09/24     Patient will increase functional independence with ADLs by performing:    UE Dressing with [place].  LE Dressing with Modified [place].  Grooming while standing at sink with Supervision.  Toileting from toilet with Supervision for hygiene and clothing management.   Sitting in chair x60  minutes with Supervision.  Toilet transfer to toilet with Supervision.  Upper extremity exercise program x10  reps per handout, with supervision.                         History:     Past Medical History:   Diagnosis Date    Allergy     Arthritis     CAD, multiple vessel     DR Melissa    Cataract     Depression     History of colon polyps     Hyperlipidemia     Hypertension     Macular degeneration     Dr Razo for injection, Heitraji for eye    S/P CABG x 2     Type 2 diabetes mellitus with circulatory disorder     Dr. Aquino         Past Surgical History:   Procedure Laterality Date    broken elbow      left    COLONOSCOPY N/A 10/4/2017    Procedure: COLONOSCOPY;  Surgeon: Gabriel Leung MD;  Location: [facility];  Service: Endoscopy;  Laterality: N/A;    EYE SURGERY      cataract    heart bypass      2004    HERNIA REPAIR      right    ROTATOR CUFF REPAIR      right  2004       Time Tracking:     OT Date of Treatment: 06/25/24  OT Start Time: 1334  OT Stop Time: 1430  OT Total Time (min): 56 min    Billable Minutes: Evaluation 15  and Self Care/Home Management 41     6/25/2024

## 2024-06-25 NOTE — ASSESSMENT & PLAN NOTE
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

## 2024-06-25 NOTE — SUBJECTIVE & OBJECTIVE
"Past Medical History:   Diagnosis Date    Allergy     Arthritis     CAD, multiple vessel     DR Melissa    Cataract     Depression     History of colon polyps     Hyperlipidemia     Hypertension     Macular degeneration     Dr Razo for injection, Jennifer for eye    S/P CABG x 2     Type 2 diabetes mellitus with circulatory disorder     Dr. Aquino       Past Surgical History:   Procedure Laterality Date    broken elbow      left    COLONOSCOPY N/A 10/4/2017    Procedure: COLONOSCOPY;  Surgeon: Gabriel Leung MD;  Location: [facility];  Service: Endoscopy;  Laterality: N/A;    EYE SURGERY      cataract    heart bypass      2004    HERNIA REPAIR      right    ROTATOR CUFF REPAIR      right  2004       Review of patient's allergies indicates:   Allergen Reactions    Aricept odt [donepezil] Diarrhea and Nausea And Vomiting    Codeine Nausea Only     weak    Ranexa [ranolazine]        No current facility-administered medications on file prior to encounter.     Current Outpatient Medications on File Prior to Encounter   Medication Sig    acetaminophen (TYLENOL) 325 MG tablet Take 2 tablets (650 mg total) by mouth every 6 (six) hours as needed.    albuterol (PROVENTIL/VENTOLIN HFA) 90 mcg/actuation inhaler INHALE 2 PUFFS BY MOUTH EVERY 6 HOURS AS NEEDED FOR WHEEZING OR  SHORTNESS  OF  BREATH    albuterol sulfate 2.5 mg/0.5 mL Nebu Take 2.5 mg by nebulization every 6 (six) hours while awake. Rescue    atorvastatin (LIPITOR) 40 MG tablet Take 40 mg by mouth once daily.    BD INSULIN PEN NEEDLE UF SHORT 31 gauge x 5/16" Ndle     BD INSULIN SYRINGE ULTRA-FINE 1/2 mL 31 gauge x 15/64" Syrg     blood sugar diagnostic Strp To check BG 3 times daily, to use with insurance preferred meter    blood-glucose meter kit To check BG 3 times daily, to use with insurance preferred meter    carvedilol (COREG) 25 MG tablet Take 1 tablet (25 mg total) by mouth 2 (two) times daily.    ceramides 1,3,6-II (CERAVE DAILY MOISTURIZING) Lotn " "Apply twice daily to skin    cinnamon bark 500 mg capsule Take 1,000 mg by mouth once daily.      clopidogreL (PLAVIX) 75 mg tablet Take 75 mg by mouth.    diclofenac sodium (VOLTAREN) 1 % Gel Apply 2 g topically 2 (two) times daily.    diclofenac sodium (VOLTAREN) 1 % Gel Apply 2 g topically 3 (three) times daily.    DULoxetine (CYMBALTA) 60 MG capsule Take 1 capsule (60 mg total) by mouth once daily.    ENTRESTO  mg per tablet Take 1 tablet by mouth 2 (two) times daily.    fluticasone propionate (FLONASE) 50 mcg/actuation nasal spray 1 spray (50 mcg total) by Each Nostril route 2 (two) times daily.    furosemide (LASIX) 40 MG tablet Take 40 mg by mouth once daily.    gabapentin (NEURONTIN) 300 MG capsule Take 4 capsules (1,200 mg total) by mouth 2 (two) times daily.    glimepiride (AMARYL) 4 MG tablet Take 4 mg by mouth daily with breakfast.     HYDROcodone-acetaminophen (NORCO) 7.5-325 mg per tablet Take 1 tablet by mouth every 8 (eight) hours as needed for Pain.    [START ON 7/21/2024] HYDROcodone-acetaminophen (NORCO) 7.5-325 mg per tablet Take 1 tablet by mouth every 8 (eight) hours as needed for Pain.    [START ON 8/20/2024] HYDROcodone-acetaminophen (NORCO) 7.5-325 mg per tablet Take 1 tablet by mouth every 8 (eight) hours as needed for Pain.    insulin NPH-insulin regular, 70/30, (NOVOLIN 70/30) 100 unit/mL (70-30) injection Inject into the skin. Inject 10 units at breakfast and inject 10 units at night    insulin syringe-needle U-100 0.3 mL 31 gauge x 15/64" Syrg USE TO INJECT INSULIN THREE TIMES DAILY    insulin syringe-needle U-100 1/2 mL 31 x 5/16" Syrg     ipratropium (ATROVENT) 42 mcg (0.06 %) nasal spray 2 sprays by Nasal route every 6 (six) hours as needed for Rhinitis (use as needed for runny nose and also use 15 minutes prior to meal). Clean nose with saline prior to use    isosorbide mononitrate (IMDUR) 30 MG 24 hr tablet Take 30 mg by mouth once daily.    lancets Misc To check BG 3 times " daily, to use with insurance preferred meter    LIDOcaine (LIDODERM) 5 % Place 1 patch onto the skin once daily. Remove & Discard patch within 12 hours or as directed by MD    meclizine (ANTIVERT) 12.5 mg tablet Take 1 tablet (12.5 mg total) by mouth 3 (three) times daily as needed for Dizziness.    metFORMIN (GLUCOPHAGE) 500 MG tablet Take 500 mg by mouth 2 (two) times daily.    methocarbamoL (ROBAXIN) 500 MG Tab TAKE 1 TABLET BY MOUTH 4 TIMES DAILY FOR 10 DAYS    mupirocin (BACTROBAN) 2 % ointment Apply topically 3 (three) times daily.    nitroGLYCERIN (NITROSTAT) 0.4 MG SL tablet DISSOLVE 1 TABLET UNDER THE TONGUE EVERY 5 MINUTES AS  NEEDED FOR CHEST PAIN. MAX  OF 3 TABLETS IN 15 MINUTES. CALL 911 IF PAIN PERSISTS.    pravastatin (PRAVACHOL) 20 MG tablet Take 1 tablet (20 mg total) by mouth once daily.    spironolactone (ALDACTONE) 25 MG tablet Take 25 mg by mouth.    tiotropium-olodateroL (STIOLTO RESPIMAT) 2.5-2.5 mcg/actuation Mist Inhale 2 puffs into the lungs once daily. Controller    triamcinolone acetonide 0.1% (KENALOG) 0.1 % cream SMARTSI Application Topical 2-3 Times Daily    valsartan (DIOVAN) 80 MG tablet Take 80 mg by mouth.    VIT C/VIT E AC/LUT/COPPER/ZINC (PRESERVISION LUTEIN ORAL) Take by mouth.    warfarin (COUMADIN) 5 MG tablet Take 2 tabs by mouth on Tuesday,Thursday, Saturday and Sundays then 1.5 on all other days.     Family History       Problem Relation (Age of Onset)    Arthritis Mother    Cancer Brother, Maternal Grandfather    Diabetes Mother, Maternal Aunt, Maternal Uncle, Paternal Aunt    Heart attack Father    Heart disease Maternal Uncle, Paternal Aunt, Paternal Uncle, Maternal Grandmother    Stroke Mother    Throat cancer Brother          Tobacco Use    Smoking status: Former     Current packs/day: 0.00     Average packs/day: 3.0 packs/day for 45.0 years (135.0 ttl pk-yrs)     Types: Cigarettes     Start date: 1959     Quit date: 2004     Years since quittin.1     Smokeless tobacco: Former   Substance and Sexual Activity    Alcohol use: Yes     Comment: was heavy drinker 35 years ago, rare use now    Drug use: No    Sexual activity: Yes     Partners: Female     Review of Systems   Constitutional:  Positive for activity change, fatigue and fever. Negative for appetite change, chills and diaphoresis.   HENT:  Positive for congestion. Negative for postnasal drip, rhinorrhea and sore throat.    Eyes:  Negative for visual disturbance.   Respiratory:  Positive for cough, shortness of breath (pelletier) and wheezing.    Cardiovascular:  Negative for chest pain, palpitations and leg swelling.   Gastrointestinal:  Positive for abdominal pain (left lower quadrant). Negative for nausea and vomiting.   Genitourinary:  Negative for dysuria.   Musculoskeletal:  Negative for arthralgias and myalgias.   Neurological:  Negative for dizziness, light-headedness and headaches.   Psychiatric/Behavioral:  The patient is not nervous/anxious.      Objective:     Vital Signs (Most Recent):  Temp: 97.8 °F (36.6 °C) (06/25/24 0651)  Pulse: 80 (06/25/24 0716)  Resp: 18 (06/25/24 0651)  BP: 131/61 (06/25/24 0651)  SpO2: 95 % (06/25/24 0651) Vital Signs (24h Range):  Temp:  [97.8 °F (36.6 °C)-102.7 °F (39.3 °C)] 97.8 °F (36.6 °C)  Pulse:  [] 80  Resp:  [16-19] 18  SpO2:  [95 %-99 %] 95 %  BP: ()/(59-66) 131/61     Weight: 93 kg (205 lb)  Body mass index is 31.17 kg/m².     Physical Exam  Vitals and nursing note reviewed.   Constitutional:       General: He is not in acute distress.     Appearance: Normal appearance. He is normal weight. He is ill-appearing. He is not toxic-appearing or diaphoretic.   HENT:      Head: Normocephalic and atraumatic.      Nose: Nose normal. No congestion or rhinorrhea.      Mouth/Throat:      Mouth: Mucous membranes are moist.      Pharynx: Oropharynx is clear.   Eyes:      General: No scleral icterus.     Extraocular Movements: Extraocular movements intact.       Conjunctiva/sclera: Conjunctivae normal.      Pupils: Pupils are equal, round, and reactive to light.   Neck:      Vascular: No carotid bruit.      Comments: No JVD  Cardiovascular:      Rate and Rhythm: Normal rate and regular rhythm.      Pulses: Normal pulses.      Heart sounds: No murmur heard.     No friction rub. No gallop.   Pulmonary:      Effort: No respiratory distress.      Breath sounds: Wheezing (Diffusely.) and rhonchi present. No rales (bilateral lower bases.).      Comments: Loose cough noted during our conversation.  Abdominal:      General: Bowel sounds are normal. There is no distension.      Palpations: Abdomen is soft.      Tenderness: There is no abdominal tenderness. There is no guarding or rebound.   Musculoskeletal:         General: No swelling or deformity. Normal range of motion.      Cervical back: Normal range of motion and neck supple.      Right lower leg: No edema.      Left lower leg: No edema.   Skin:     General: Skin is warm and dry.      Capillary Refill: Capillary refill takes less than 2 seconds.      Coloration: Skin is pale.   Neurological:      General: No focal deficit present.      Mental Status: He is alert and oriented to person, place, and time.      Cranial Nerves: No cranial nerve deficit.      Sensory: No sensory deficit.      Motor: No weakness.      Coordination: Coordination normal.   Psychiatric:         Mood and Affect: Mood normal.         Behavior: Behavior normal.              CRANIAL NERVES     CN III, IV, VI   Pupils are equal, round, and reactive to light.       Recent Results (from the past 24 hour(s))   POCT glucose    Collection Time: 06/24/24 11:04 PM   Result Value Ref Range    POCT Glucose 193 (H) 70 - 110 mg/dL   POCT Influenza A/B Molecular    Collection Time: 06/24/24 11:11 PM   Result Value Ref Range    POC Molecular Influenza A Ag Negative Negative    POC Molecular Influenza B Ag Negative Negative     Acceptable Yes    POCT  COVID-19 Rapid Screening    Collection Time: 06/24/24 11:11 PM   Result Value Ref Range    POC Rapid COVID Negative Negative     Acceptable Yes    CBC auto differential    Collection Time: 06/24/24 11:11 PM   Result Value Ref Range    WBC 10.04 3.90 - 12.70 K/uL    RBC 3.95 (L) 4.60 - 6.20 M/uL    Hemoglobin 11.4 (L) 14.0 - 18.0 g/dL    Hematocrit 34.9 (L) 40.0 - 54.0 %    MCV 88 82 - 98 fL    MCH 28.9 27.0 - 31.0 pg    MCHC 32.7 32.0 - 36.0 g/dL    RDW 14.4 11.5 - 14.5 %    Platelets 181 150 - 450 K/uL    MPV 10.2 9.2 - 12.9 fL    Immature Granulocytes 0.5 0.0 - 0.5 %    Gran # (ANC) 8.0 (H) 1.8 - 7.7 K/uL    Immature Grans (Abs) 0.05 (H) 0.00 - 0.04 K/uL    Lymph # 0.8 (L) 1.0 - 4.8 K/uL    Mono # 1.2 (H) 0.3 - 1.0 K/uL    Eos # 0.1 0.0 - 0.5 K/uL    Baso # 0.03 0.00 - 0.20 K/uL    nRBC 0 0 /100 WBC    Gran % 79.2 (H) 38.0 - 73.0 %    Lymph % 7.8 (L) 18.0 - 48.0 %    Mono % 11.7 4.0 - 15.0 %    Eosinophil % 0.5 0.0 - 8.0 %    Basophil % 0.3 0.0 - 1.9 %    Differential Method Automated    Comprehensive metabolic panel    Collection Time: 06/24/24 11:11 PM   Result Value Ref Range    Sodium 133 (L) 136 - 145 mmol/L    Potassium 5.8 (H) 3.5 - 5.1 mmol/L    Chloride 99 95 - 110 mmol/L    CO2 23 23 - 29 mmol/L    Glucose 198 (H) 70 - 110 mg/dL    BUN 32 (H) 8 - 23 mg/dL    Creatinine 1.9 (H) 0.5 - 1.4 mg/dL    Calcium 8.8 8.7 - 10.5 mg/dL    Total Protein 7.1 6.0 - 8.4 g/dL    Albumin 3.2 (L) 3.5 - 5.2 g/dL    Total Bilirubin 0.6 0.1 - 1.0 mg/dL    Alkaline Phosphatase 85 55 - 135 U/L    AST 13 10 - 40 U/L    ALT 9 (L) 10 - 44 U/L    eGFR 35 (A) >60 mL/min/1.73 m^2    Anion Gap 11 8 - 16 mmol/L   Procalcitonin    Collection Time: 06/24/24 11:11 PM   Result Value Ref Range    Procalcitonin 0.10 <0.25 ng/mL   C-reactive protein    Collection Time: 06/24/24 11:11 PM   Result Value Ref Range    CRP 61.5 (H) 0.0 - 8.2 mg/L   Blood culture x two cultures. Draw prior to antibiotics.    Collection Time: 06/24/24  11:12 PM    Specimen: Peripheral, Upper Arm, Right; Blood   Result Value Ref Range    Blood Culture, Routine No Growth to date    Blood culture x two cultures. Draw prior to antibiotics.    Collection Time: 06/24/24 11:20 PM    Specimen: Peripheral, Antecubital, Left; Blood   Result Value Ref Range    Blood Culture, Routine No Growth to date    Protime-INR    Collection Time: 06/24/24 11:20 PM   Result Value Ref Range    Prothrombin Time 20.7 (H) 9.0 - 12.5 sec    INR 2.0 (H) 0.8 - 1.2   ISTAT Lactate    Collection Time: 06/24/24 11:33 PM   Result Value Ref Range    POC Lactate 0.50 0.5 - 2.2 mmol/L    Sample VENOUS     Site Other     Allens Test N/A     DelSys Nasal Can     Mode SPONT     Flow 3.5     Sp02 97    Urinalysis, Reflex to Urine Culture Urine, Clean Catch    Collection Time: 06/24/24 11:34 PM    Specimen: Urine   Result Value Ref Range    Specimen UA Urine, Clean Catch     Color, UA Yellow Yellow, Straw, Dorothy    Appearance, UA Clear Clear    pH, UA 6.0 5.0 - 8.0    Specific Gravity, UA 1.020 1.005 - 1.030    Protein, UA 1+ (A) Negative    Glucose, UA Negative Negative    Ketones, UA Negative Negative    Bilirubin (UA) Negative Negative    Occult Blood UA Negative Negative    Nitrite, UA Negative Negative    Urobilinogen, UA 4.0-6.0 (A) <2.0 EU/dL    Leukocytes, UA Negative Negative   Urinalysis Microscopic    Collection Time: 06/24/24 11:34 PM   Result Value Ref Range    RBC, UA 0 0 - 4 /hpf    WBC, UA 0 0 - 5 /hpf    Bacteria None None-Occ /hpf    Hyaline Casts, UA 0 0-1/lpf /lpf    Microscopic Comment SEE COMMENT    Brain natriuretic peptide    Collection Time: 06/24/24 11:37 PM   Result Value Ref Range     (H) 0 - 99 pg/mL   Troponin I    Collection Time: 06/24/24 11:37 PM   Result Value Ref Range    Troponin I 0.010 0.000 - 0.026 ng/mL   Lactic acid, plasma #2    Collection Time: 06/25/24  3:27 AM   Result Value Ref Range    Lactate (Lactic Acid) 1.2 0.5 - 2.2 mmol/L       Microbiology  Results (last 7 days)       Procedure Component Value Units Date/Time    Blood culture x two cultures. Draw prior to antibiotics. [[id number]] Collected: 06/24/24 2320    Order Status: Completed Specimen: Blood from Peripheral, Antecubital, Left Updated: 06/25/24 0712     Blood Culture, Routine No Growth to date    Narrative:      Aerobic and anaerobic    Blood culture x two cultures. Draw prior to antibiotics. [[id number]] Collected: 06/24/24 2312    Order Status: Completed Specimen: Blood from Peripheral, Upper Arm, Right Updated: 06/25/24 0712     Blood Culture, Routine No Growth to date    Narrative:      Aerobic and anaerobic            Imaging Results              US Abdomen Limited (Final result)  Result time 06/25/24 03:23:14      Final result by Jessica Mejia MD (06/25/24 03:23:14)                   Impression:      Cholelithiasis and distended gallbladder.  No definite secondary sonographic evidence to suggest acute cholecystitis at this time noting there is no gallbladder wall thickening or hyperemia and reported negative sonographic Cervantes sign although the patient received pain medication prior to study which can limit assessment.  Clinical correlation and further assessment as warranted.      Electronically signed by: Jessica Mejia MD  Date:    06/25/2024  Time:    03:23               Narrative:    EXAMINATION:  US ABDOMEN LIMITED    CLINICAL HISTORY:  gallbladder finding on CT;    TECHNIQUE:  Limited ultrasound of the right upper quadrant of the abdomen focused on the biliary system was performed.    COMPARISON:  CT 06/25/2024    FINDINGS:  Gallbladder: The gallbladder is distended containing biliary sludge and a 1.4 cm stone near the gallbladder neck.  No gallbladder wall thickening or hyperemia.  Sonographic Cervantes sign is reported to be negative by the ultrasound technologist noting the patient did received pain medication which can limit assessment.    Biliary system: The common duct is not  dilated, measuring 4 mm.  No intrahepatic ductal dilatation.    Pancreas: Largely obscured by bowel gas artifact.  Unremarkable in its visualized extent.    Miscellaneous: No upper abdominal ascites and no abnormalities of the visualized liver.  No right hydronephrosis.  Subcentimeter anechoic structure projecting from the right kidney suggestive of cyst.                                       CT Renal Stone Study ABD Pelvis WO (Final result)  Result time 06/25/24 00:50:14      Final result by Jessica Mejia MD (06/25/24 00:50:14)                   Impression:      1. No evidence of hydronephrosis/obstructive uropathy bilaterally.  Nonobstructing right nephrolithiasis.  2. Right lower lobe consolidative change superimposed upon a background of diffuse emphysematous change.  Correlation for infectious process advised.  3. Cholelithiasis noting mild distention of the gallbladder.  Further assessment as warranted.  4. Left inguinal hernia containing fat and a portion of the urinary bladder.  5. 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  6. Additional findings as above.      Electronically signed by: Jessica Mejia MD  Date:    06/25/2024  Time:    00:50               Narrative:    EXAMINATION:  CT RENAL STONE STUDY ABD PELVIS WO    CLINICAL HISTORY:  Left lower quadrant pain;    TECHNIQUE:  Low dose axial images, sagittal and coronal reformations were obtained from the lung bases to the pubic symphysis.  Contrast was not administered.    COMPARISON:  Renal stone study 08/07/2021    FINDINGS:  There is right basilar consolidative change.  Correlation for infectious process advised.  Findings superimposed upon a background of emphysematous change.  The visualized portions of the heart and pericardium demonstrate calcific atherosclerosis  of the coronary vessels.  Please note evaluation of solid organ parenchyma is limited due to lack of IV contrast.  The liver, spleen, pancreas and adrenal glands demonstrate an unremarkable noncontrast CT appearance.  Gallbladder is distended and contains a calcified stone in its lumen.    There is nonspecific bilateral perinephric fat stranding.  There is a subcentimeter exophytic right renal hypodensity too small to definitively characterize.  There is a punctate nonobstructing right renal calculus.  No left-sided nephrolithiasis.  There is no hydroureteronephrosis bilaterally noting a small calcific density in the left pelvis appears to be posterior to the left ureter and was present on prior study.  Prostate is within normal limits.  There is a left inguinal hernia containing a small portion of the urinary bladder.    There is significant atherosclerotic plaquing of the abdominal aorta and abdominal visceral vasculature.  There is aneurysmal dilatation of the infrarenal abdominal aorta measuring approximately 2.5 cm.  There is a central linear calcification in this region which could reflect chronic dissection, unchanged from prior exam of 2021.  There are shotty periaortic lymph nodes.    The visualized loops of large and small bowel demonstrate no evidence of obstruction or inflammatory change.  There is diverticulosis without definite evidence to suggest acute diverticulitis at this time.  The appendix is unremarkable.  There is no ascites, free intraperitoneal air or portal venous gas.    Visualized osseous structures are intact with degenerative change.                                       X-Ray Chest AP Portable (Final result)  Result time 06/25/24 00:08:00      Final result by Veronika Desouza MD (06/25/24 00:08:00)                   Impression:      No significant change.      Electronically signed by: Veronika Desouza  Date:    06/25/2024  Time:    00:08               Narrative:    EXAMINATION:  AP  PORTABLE CHEST    CLINICAL HISTORY:  Sepsis;    TECHNIQUE:  AP portable chest radiograph was submitted.    COMPARISON:  06/20/2024    FINDINGS:  Median sternotomy changes are present.  AP portable chest radiograph demonstrates a cardiac silhouette within normal limits.  Vascular calcifications are seen at the aortic knob.  There is nonspecific prominence of the interstitium.  There is no pneumothorax.  There is no focal consolidation, pneumothorax, or pleural effusion.

## 2024-06-25 NOTE — PLAN OF CARE
Problem: Occupational Therapy  Goal: Occupational Therapy Goal  Description: Goals to be met by: 07/09/24     Patient will increase functional independence with ADLs by performing:    UE Dressing with [place].  LE Dressing with Modified [place].  Grooming while standing at sink with Supervision.  Toileting from toilet with Supervision for hygiene and clothing management.   Sitting in chair x60  minutes with Supervision.  Toilet transfer to toilet with Supervision.  Upper extremity exercise program x10  reps per handout, with supervision.    Outcome: Progressing   Patient recommended for moderate intensity level therapy and occupational therapy to see 5x/week while in acute care. DME TBD. 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 He does not have his hand held magnifier with him today,but has a magnifier on his smart phone.

## 2024-06-25 NOTE — HPI
83-year-old  male with past medical history significant for non-insulin-dependent type 2 diabetes, essential hypertension, COPD (on 4L  home oxygen)/ILD, h/o CVA,  permanent atrial fibrillation (on Coumadin 10 mg every day except for 7.5 mg on Monday Wednesday Friday), CAD (s/p 2V CABG 2024 and follow with Dr. Melissa/[facility]) presents to the ER with fevers and reports a generalized weakness and chills.  States increased cough but always has a cough.  Denies any productive sputum.  States he was short of breath and has dyspnea on exertion worsened normal.  With complaining of abdominal pain but no reports of nausea vomiting or diarrhea.    Workup in the ER for mildly elevated white blood count of 10.  Lactic acid was normal.  Liver enzymes were normal.  Pro count was normal.  His INR was 2.  Urine was not consistent with urinary tract infection.  COVID/FLU negative.     Chest x-ray:  Median sternotomy changes are present.  AP portable chest radiograph demonstrates a cardiac silhouette within normal limits.  Vascular calcifications are seen at the aortic knob.  There is nonspecific prominence of the interstitium.  There is no pneumothorax.  There is no focal consolidation, pneumothorax, or pleural effusion.     CT of the abdomen  1.. No evidence of hydronephrosis/obstructive uropathy bilaterally.  Nonobstructing right nephrolithiasis.  2. Right lower lobe consolidative change superimposed upon a background of diffuse emphysematous change.  Correlation for infectious process advised.  3. Cholelithiasis noting mild distention of the gallbladder.  Further assessment as warranted.  4. Left inguinal hernia containing fat and a portion of the urinary bladder.  5. 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    Abdominal  ultrasound:   Cholelithiasis and distended gallbladder.  No definite secondary sonographic evidence to suggest acute cholecystitis at this time noting there is no gallbladder wall thickening or hyperemia and reported negative sonographic Cervantes sign although the patient received pain medication prior to study which can limit assessment.  Clinical correlation and further assessment as warranted.      Due to  His port score is 121 he was started  IV abx Vanc and Zosyn to cover for possible Right lower lobe PNA.      Because this patient's clinical condition is guarded due to his sepsis and requires IVF abx and close monitoring of his respiratory status, care in an alternative location isn't clinically appropriate for the reasons stated above.     We have been consulted for further management and inpatient telemetry admission to hospitalist Medicine Service.

## 2024-06-25 NOTE — PT/OT/SLP EVAL
"Physical Therapy Evaluation and Treatment    Patient Name: Percy Ramirez   MRN: [id number]  Recent Surgery: * No surgery found *      Recommendations:     Discharge Recommendations: Moderate Intensity Therapy   Discharge Equipment Recommendations: none       Assessment:     Percy Ramirez is a 83 y.o. male admitted with a medical diagnosis of <principal problem not specified>. He presents with the following impairments/functional limitations: weakness, impaired endurance, impaired functional mobility, gait instability.     Rehab Prognosis: Good; patient would benefit from acute PT services to address these deficits and reach maximum level of function.    Plan:     During this hospitalization, patient to be seen 5 x/week to address the above listed problems via gait training, therapeutic activities, therapeutic exercises    Plan of Care Expires: 07/02/24    Subjective     Chief Complaint: "I don't want to fall"  Patient Comments/Goals: Pt reports weakness and tends to fall backwards when seated.  Pain/Comfort:  Pain Rating 1: 0/10    Social History:  Living Environment: Patient lives with their son in a single story home with number of outside stair(s): 1 3"  Prior Level of Function: Prior to admission, patient with undetermined level of function  Equipment Used at Home: rollator, oxygen, shower chair  DME owned (not currently used):  power wheelchair  Assistance Upon Discharge: family    Objective:     Communicated with nurseMeliza prior to session. Patient found HOB elevated with oxygen, PureWick, peripheral IV, telemetry upon PT entry to room.    General Precautions: Standard, fall   Orthopedic Precautions: N/A   Braces: N/A    Respiratory Status: Nasal cannula, flow 3.5 L/min    Exams:  Cognition: Patient is oriented to Person, Place, Time, Situation  RLE ROM: WFL except hip pain with knee flexion  RLE Strength: WFL  LLE ROM: WFL  LLE Strength: WFL  Sensation:    -       Intact  light/touch BLEs    Functional " Mobility:  Gait belt applied - Yes  Bed Mobility  Supine to Sit: moderate assistance for LE management and trunk management  Transfers  Sit to Stand: minimum assistance and moderate assistance with rolling walker  Gait  Patient ambulated a few small steps to chair with rolling walker and minimum assistance and moderate assistance.   Balance  Sitting: stand by assistance and minimum assistance  Standing: moderate assistance      Therapeutic Activities and Exercises:   Patient educated on role of acute care PT and PT POC and call light usage  Patient performed 1 set(s) of 10 repetitions of the following seated exercises: ankle pumps, long arc quads, marches, and adduction squeezes for bilateral LE. Patient required skilled PT for instruction of exercises and appropriate cues to perform exercises safely and appropriately.  Patient educated about importance of OOB mobility and remaining up in chair most of the day.  Pt with (R) eye shut from dried mucous, provided with warm wash cloth to wipe eyes; pt reports having macular degeneration therefore, vision not clear.      AM-PAC 6 CLICK MOBILITY  Total Score:16    Patient left  reclined in chair  with all lines intact, call button in reach, RN notified, and all needs in reach .    GOALS:   Multidisciplinary Problems       Physical Therapy Goals          Problem: Physical Therapy    Goal Priority Disciplines Outcome Goal Variances Interventions   Physical Therapy Goal     PT, PT/OT Progressing     Description: Goals to be met by: 24     Patient will increase functional independence with mobility by performin. Pt to be SBA with bed mobility.  2. Pt to transfer with CGA.  3. Pt to ambulate 50' w/RW CGA.  4. Pt to be (I) with HEP.                         History:     Past Medical History:   Diagnosis Date    Allergy     Arthritis     CAD, multiple vessel     DR Melissa    Cataract     Depression     History of colon polyps     Hyperlipidemia     Hypertension      Macular degeneration     Dr Razo for injection, Jennifer for eye    S/P CABG x 2     Type 2 diabetes mellitus with circulatory disorder     Dr. Aquino       Past Surgical History:   Procedure Laterality Date    broken elbow      left    COLONOSCOPY N/A 10/4/2017    Procedure: COLONOSCOPY;  Surgeon: Gabriel Leung MD;  Location: [facility];  Service: Endoscopy;  Laterality: N/A;    EYE SURGERY      cataract    heart bypass      2004    HERNIA REPAIR      right    ROTATOR CUFF REPAIR      right  2004       Time Tracking:     PT Received On: 06/25/24  PT Start Time: 1025  PT Stop Time: 1055  PT Total Time (min): 30 min     Billable Minutes: Evaluation 15 and Therapeutic Exercise 15    6/25/2024

## 2024-06-25 NOTE — ASSESSMENT & PLAN NOTE
Patient's FSGs are uncontrolled due to hyperglycemia on current medication regimen.  Last A1c reviewed-   Lab Results   Component Value Date    LABA1C 6.8 10/09/2017    HGBA1C 10.7 (H) 12/20/2023     Most recent fingerstick glucose reviewed-   Recent Labs   Lab 06/24/24  2304   POCTGLUCOSE 193*     Current correctional scale  Medium  Maintain anti-hyperglycemic dose as follows-   Antihyperglycemics (From admission, onward)      Start     Stop Route Frequency Ordered    06/25/24 0715  insulin aspart protamine-insulin aspart (NovoLOG 70/30) injection         -- SubQ 2 times daily with meals 06/25/24 0250          Hold Oral hypoglycemics while patient is in the hospital.  Diet adjusted.

## 2024-06-25 NOTE — PLAN OF CARE
Patient has been seen and examinated,  Patient with past medical history significant for non-insulin-dependent type 2 diabetes, essential hypertension, COPD (on 4L home oxygen)/ILD, h/o CVA, permanent atrial fibrillation (on Coumadin 10 mg every day except for 7.5 mg on Monday Wednesday Friday), CAD (s/p 2V CABG 2024 and follow with Dr. [facility]) presents to the ER with fevers and reports a generalized weakness and chill ,patient is on IV Abx for URI.consulted ST for possible aspiration,will monitor closely,

## 2024-06-25 NOTE — ASSESSMENT & PLAN NOTE
- Concern for this; SLP consulted. Discussed link between aspiration and pneumonia with pt and his daughter.

## 2024-06-25 NOTE — H&P
[facility] Medicine  History & Physical    Patient Name: Percy Ramirez  MRN: [id number]  Patient Class: IP- Inpatient  Admission Date: 6/24/2024  Attending Physician: Dr. Trudy Morris   Primary Care Provider: Kasie Edmondson MD         Patient information was obtained from patient, past medical records, and ER records.     Subjective:     Principal Problem:<principal problem not specified>    Chief Complaint:   Chief Complaint   Patient presents with    Fatigue     Pt arrived via ems, pt chief complaint is Weakness. Pt states has been feeling weak for past 2 days and having a fever as well.         HPI: 83-year-old  male with past medical history significant for non-insulin-dependent type 2 diabetes, essential hypertension, COPD (on 4L  home oxygen)/ILD, h/o CVA,  permanent atrial fibrillation (on Coumadin 10 mg every day except for 7.5 mg on Monday Wednesday Friday), CAD (s/p 2V CABG 2024 and follow with Dr. Melissa[facility]) presents to the ER with fevers and reports a generalized weakness and chills.  States increased cough but always has a cough.  Denies any productive sputum.  States he was short of breath and has dyspnea on exertion worsened normal.  With complaining of abdominal pain but no reports of nausea vomiting or diarrhea.    Workup in the ER for mildly elevated white blood count of 10.  Lactic acid was normal.  Liver enzymes were normal.  Pro count was normal.  His INR was 2.  Urine was not consistent with urinary tract infection.  COVID/FLU negative.     Chest x-ray:  Median sternotomy changes are present.  AP portable chest radiograph demonstrates a cardiac silhouette within normal limits.  Vascular calcifications are seen at the aortic knob.  There is nonspecific prominence of the interstitium.  There is no pneumothorax.  There is no focal consolidation, pneumothorax, or pleural effusion.     CT of the abdomen  1.. No evidence of  hydronephrosis/obstructive uropathy bilaterally.  Nonobstructing right nephrolithiasis.  2. Right lower lobe consolidative change superimposed upon a background of diffuse emphysematous change.  Correlation for infectious process advised.  3. Cholelithiasis noting mild distention of the gallbladder.  Further assessment as warranted.  4. Left inguinal hernia containing fat and a portion of the urinary bladder.  5. 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    Abdominal ultrasound:   Cholelithiasis and distended gallbladder.  No definite secondary sonographic evidence to suggest acute cholecystitis at this time noting there is no gallbladder wall thickening or hyperemia and reported negative sonographic Cervantes sign although the patient received pain medication prior to study which can limit assessment.  Clinical correlation and further assessment as warranted.      Due to  His port score is 121 he was started  IV abx Vanc and Zosyn to cover for possible Right lower lobe PNA.      Because this patient's clinical condition is guarded due to his sepsis and requires IVF abx and close monitoring of his respiratory status, care in an alternative location isn't clinically appropriate for the reasons stated above.     We have been consulted for further management and inpatient telemetry admission to hospitalist Medicine Service.           Past Medical History:   Diagnosis Date    Allergy     Arthritis     CAD, multiple vessel     DR Melissa    Cataract     Depression     History of colon polyps     Hyperlipidemia     Hypertension     Macular degeneration     Dr Razo for injectionJennifer for eye    S/P CABG x 2     Type 2 diabetes mellitus with circulatory disorder     Dr. Aquino       Past Surgical History:   Procedure Laterality Date     "broken elbow      left    COLONOSCOPY N/A 10/4/2017    Procedure: COLONOSCOPY;  Surgeon: Gabriel Leung MD;  Location: [facility];  Service: Endoscopy;  Laterality: N/A;    EYE SURGERY      cataract    heart bypass      2004    HERNIA REPAIR      right    ROTATOR CUFF REPAIR      right  2004       Review of patient's allergies indicates:   Allergen Reactions    Aricept odt [donepezil] Diarrhea and Nausea And Vomiting    Codeine Nausea Only     weak    Ranexa [ranolazine]        No current facility-administered medications on file prior to encounter.     Current Outpatient Medications on File Prior to Encounter   Medication Sig    acetaminophen (TYLENOL) 325 MG tablet Take 2 tablets (650 mg total) by mouth every 6 (six) hours as needed.    albuterol (PROVENTIL/VENTOLIN HFA) 90 mcg/actuation inhaler INHALE 2 PUFFS BY MOUTH EVERY 6 HOURS AS NEEDED FOR WHEEZING OR  SHORTNESS  OF  BREATH    albuterol sulfate 2.5 mg/0.5 mL Nebu Take 2.5 mg by nebulization every 6 (six) hours while awake. Rescue    atorvastatin (LIPITOR) 40 MG tablet Take 40 mg by mouth once daily.    BD INSULIN PEN NEEDLE UF SHORT 31 gauge x 5/16" Ndle     BD INSULIN SYRINGE ULTRA-FINE 1/2 mL 31 gauge x 15/64" Syrg     blood sugar diagnostic Strp To check BG 3 times daily, to use with insurance preferred meter    blood-glucose meter kit To check BG 3 times daily, to use with insurance preferred meter    carvedilol (COREG) 25 MG tablet Take 1 tablet (25 mg total) by mouth 2 (two) times daily.    ceramides 1,3,6-II (CERAVE DAILY MOISTURIZING) Lotn Apply twice daily to skin    cinnamon bark 500 mg capsule Take 1,000 mg by mouth once daily.      clopidogreL (PLAVIX) 75 mg tablet Take 75 mg by mouth.    diclofenac sodium (VOLTAREN) 1 % Gel Apply 2 g topically 2 (two) times daily.    diclofenac sodium (VOLTAREN) 1 % Gel Apply 2 g topically 3 (three) times daily.    DULoxetine (CYMBALTA) 60 MG capsule Take 1 capsule (60 mg total) by mouth once daily.    " "ENTRESTO  mg per tablet Take 1 tablet by mouth 2 (two) times daily.    fluticasone propionate (FLONASE) 50 mcg/actuation nasal spray 1 spray (50 mcg total) by Each Nostril route 2 (two) times daily.    furosemide (LASIX) 40 MG tablet Take 40 mg by mouth once daily.    gabapentin (NEURONTIN) 300 MG capsule Take 4 capsules (1,200 mg total) by mouth 2 (two) times daily.    glimepiride (AMARYL) 4 MG tablet Take 4 mg by mouth daily with breakfast.     HYDROcodone-acetaminophen (NORCO) 7.5-325 mg per tablet Take 1 tablet by mouth every 8 (eight) hours as needed for Pain.    [START ON 7/21/2024] HYDROcodone-acetaminophen (NORCO) 7.5-325 mg per tablet Take 1 tablet by mouth every 8 (eight) hours as needed for Pain.    [START ON 8/20/2024] HYDROcodone-acetaminophen (NORCO) 7.5-325 mg per tablet Take 1 tablet by mouth every 8 (eight) hours as needed for Pain.    insulin NPH-insulin regular, 70/30, (NOVOLIN 70/30) 100 unit/mL (70-30) injection Inject into the skin. Inject 10 units at breakfast and inject 10 units at night    insulin syringe-needle U-100 0.3 mL 31 gauge x 15/64" Syrg USE TO INJECT INSULIN THREE TIMES DAILY    insulin syringe-needle U-100 1/2 mL 31 x 5/16" Syrg     ipratropium (ATROVENT) 42 mcg (0.06 %) nasal spray 2 sprays by Nasal route every 6 (six) hours as needed for Rhinitis (use as needed for runny nose and also use 15 minutes prior to meal). Clean nose with saline prior to use    isosorbide mononitrate (IMDUR) 30 MG 24 hr tablet Take 30 mg by mouth once daily.    lancets Misc To check BG 3 times daily, to use with insurance preferred meter    LIDOcaine (LIDODERM) 5 % Place 1 patch onto the skin once daily. Remove & Discard patch within 12 hours or as directed by MD    meclizine (ANTIVERT) 12.5 mg tablet Take 1 tablet (12.5 mg total) by mouth 3 (three) times daily as needed for Dizziness.    metFORMIN (GLUCOPHAGE) 500 MG tablet Take 500 mg by mouth 2 (two) times daily.    methocarbamoL (ROBAXIN) 500 " MG Tab TAKE 1 TABLET BY MOUTH 4 TIMES DAILY FOR 10 DAYS    mupirocin (BACTROBAN) 2 % ointment Apply topically 3 (three) times daily.    nitroGLYCERIN (NITROSTAT) 0.4 MG SL tablet DISSOLVE 1 TABLET UNDER THE TONGUE EVERY 5 MINUTES AS  NEEDED FOR CHEST PAIN. MAX  OF 3 TABLETS IN 15 MINUTES. CALL 911 IF PAIN PERSISTS.    pravastatin (PRAVACHOL) 20 MG tablet Take 1 tablet (20 mg total) by mouth once daily.    spironolactone (ALDACTONE) 25 MG tablet Take 25 mg by mouth.    tiotropium-olodateroL (STIOLTO RESPIMAT) 2.5-2.5 mcg/actuation Mist Inhale 2 puffs into the lungs once daily. Controller    triamcinolone acetonide 0.1% (KENALOG) 0.1 % cream SMARTSI Application Topical 2-3 Times Daily    valsartan (DIOVAN) 80 MG tablet Take 80 mg by mouth.    VIT C/VIT E AC/LUT/COPPER/ZINC (PRESERVISION LUTEIN ORAL) Take by mouth.    warfarin (COUMADIN) 5 MG tablet Take 2 tabs by mouth on Tuesday,Thursday, Saturday and Sundays then 1.5 on all other days.     Family History       Problem Relation (Age of Onset)    Arthritis Mother    Cancer Brother, Maternal Grandfather    Diabetes Mother, Maternal Aunt, Maternal Uncle, Paternal Aunt    Heart attack Father    Heart disease Maternal Uncle, Paternal Aunt, Paternal Uncle, Maternal Grandmother    Stroke Mother    Throat cancer Brother          Tobacco Use    Smoking status: Former     Current packs/day: 0.00     Average packs/day: 3.0 packs/day for 45.0 years (135.0 ttl pk-yrs)     Types: Cigarettes     Start date: 1959     Quit date: 2004     Years since quittin.1    Smokeless tobacco: Former   Substance and Sexual Activity    Alcohol use: Yes     Comment: was heavy drinker 35 years ago, rare use now    Drug use: No    Sexual activity: Yes     Partners: Female     Review of Systems   Constitutional:  Positive for activity change, fatigue and fever. Negative for appetite change, chills and diaphoresis.   HENT:  Positive for congestion. Negative for postnasal drip,  rhinorrhea and sore throat.    Eyes:  Negative for visual disturbance.   Respiratory:  Positive for cough, shortness of breath (pelletier) and wheezing.    Cardiovascular:  Negative for chest pain, palpitations and leg swelling.   Gastrointestinal:  Positive for abdominal pain (left lower quadrant). Negative for nausea and vomiting.   Genitourinary:  Negative for dysuria.   Musculoskeletal:  Negative for arthralgias and myalgias.   Neurological:  Negative for dizziness, light-headedness and headaches.   Psychiatric/Behavioral:  The patient is not nervous/anxious.      Objective:     Vital Signs (Most Recent):  Temp: 97.8 °F (36.6 °C) (06/25/24 0651)  Pulse: 80 (06/25/24 0716)  Resp: 18 (06/25/24 0651)  BP: 131/61 (06/25/24 0651)  SpO2: 95 % (06/25/24 0651) Vital Signs (24h Range):  Temp:  [97.8 °F (36.6 °C)-102.7 °F (39.3 °C)] 97.8 °F (36.6 °C)  Pulse:  [] 80  Resp:  [16-19] 18  SpO2:  [95 %-99 %] 95 %  BP: ()/(59-66) 131/61     Weight: 93 kg (205 lb)  Body mass index is 31.17 kg/m².     Physical Exam  Vitals and nursing note reviewed.   Constitutional:       General: He is not in acute distress.     Appearance: Normal appearance. He is normal weight. He is ill-appearing. He is not toxic-appearing or diaphoretic.   HENT:      Head: Normocephalic and atraumatic.      Nose: Nose normal. No congestion or rhinorrhea.      Mouth/Throat:      Mouth: Mucous membranes are moist.      Pharynx: Oropharynx is clear.   Eyes:      General: No scleral icterus.     Extraocular Movements: Extraocular movements intact.      Conjunctiva/sclera: Conjunctivae normal.      Pupils: Pupils are equal, round, and reactive to light.   Neck:      Vascular: No carotid bruit.      Comments: No JVD  Cardiovascular:      Rate and Rhythm: Normal rate and regular rhythm.      Pulses: Normal pulses.      Heart sounds: No murmur heard.     No friction rub. No gallop.   Pulmonary:      Effort: No respiratory distress.      Breath sounds:  Wheezing (Diffusely.) and rhonchi present. No rales (bilateral lower bases.).      Comments: Loose cough noted during our conversation.  Abdominal:      General: Bowel sounds are normal. There is no distension.      Palpations: Abdomen is soft.      Tenderness: There is no abdominal tenderness. There is no guarding or rebound.   Musculoskeletal:         General: No swelling or deformity. Normal range of motion.      Cervical back: Normal range of motion and neck supple.      Right lower leg: No edema.      Left lower leg: No edema.   Skin:     General: Skin is warm and dry.      Capillary Refill: Capillary refill takes less than 2 seconds.      Coloration: Skin is pale.   Neurological:      General: No focal deficit present.      Mental Status: He is alert and oriented to person, place, and time.      Cranial Nerves: No cranial nerve deficit.      Sensory: No sensory deficit.      Motor: No weakness.      Coordination: Coordination normal.   Psychiatric:         Mood and Affect: Mood normal.         Behavior: Behavior normal.              CRANIAL NERVES     CN III, IV, VI   Pupils are equal, round, and reactive to light.       Recent Results (from the past 24 hour(s))   POCT glucose    Collection Time: 06/24/24 11:04 PM   Result Value Ref Range    POCT Glucose 193 (H) 70 - 110 mg/dL   POCT Influenza A/B Molecular    Collection Time: 06/24/24 11:11 PM   Result Value Ref Range    POC Molecular Influenza A Ag Negative Negative    POC Molecular Influenza B Ag Negative Negative     Acceptable Yes    POCT COVID-19 Rapid Screening    Collection Time: 06/24/24 11:11 PM   Result Value Ref Range    POC Rapid COVID Negative Negative     Acceptable Yes    CBC auto differential    Collection Time: 06/24/24 11:11 PM   Result Value Ref Range    WBC 10.04 3.90 - 12.70 K/uL    RBC 3.95 (L) 4.60 - 6.20 M/uL    Hemoglobin 11.4 (L) 14.0 - 18.0 g/dL    Hematocrit 34.9 (L) 40.0 - 54.0 %    MCV 88 82 - 98 fL     MCH 28.9 27.0 - 31.0 pg    MCHC 32.7 32.0 - 36.0 g/dL    RDW 14.4 11.5 - 14.5 %    Platelets 181 150 - 450 K/uL    MPV 10.2 9.2 - 12.9 fL    Immature Granulocytes 0.5 0.0 - 0.5 %    Gran # (ANC) 8.0 (H) 1.8 - 7.7 K/uL    Immature Grans (Abs) 0.05 (H) 0.00 - 0.04 K/uL    Lymph # 0.8 (L) 1.0 - 4.8 K/uL    Mono # 1.2 (H) 0.3 - 1.0 K/uL    Eos # 0.1 0.0 - 0.5 K/uL    Baso # 0.03 0.00 - 0.20 K/uL    nRBC 0 0 /100 WBC    Gran % 79.2 (H) 38.0 - 73.0 %    Lymph % 7.8 (L) 18.0 - 48.0 %    Mono % 11.7 4.0 - 15.0 %    Eosinophil % 0.5 0.0 - 8.0 %    Basophil % 0.3 0.0 - 1.9 %    Differential Method Automated    Comprehensive metabolic panel    Collection Time: 06/24/24 11:11 PM   Result Value Ref Range    Sodium 133 (L) 136 - 145 mmol/L    Potassium 5.8 (H) 3.5 - 5.1 mmol/L    Chloride 99 95 - 110 mmol/L    CO2 23 23 - 29 mmol/L    Glucose 198 (H) 70 - 110 mg/dL    BUN 32 (H) 8 - 23 mg/dL    Creatinine 1.9 (H) 0.5 - 1.4 mg/dL    Calcium 8.8 8.7 - 10.5 mg/dL    Total Protein 7.1 6.0 - 8.4 g/dL    Albumin 3.2 (L) 3.5 - 5.2 g/dL    Total Bilirubin 0.6 0.1 - 1.0 mg/dL    Alkaline Phosphatase 85 55 - 135 U/L    AST 13 10 - 40 U/L    ALT 9 (L) 10 - 44 U/L    eGFR 35 (A) >60 mL/min/1.73 m^2    Anion Gap 11 8 - 16 mmol/L   Procalcitonin    Collection Time: 06/24/24 11:11 PM   Result Value Ref Range    Procalcitonin 0.10 <0.25 ng/mL   C-reactive protein    Collection Time: 06/24/24 11:11 PM   Result Value Ref Range    CRP 61.5 (H) 0.0 - 8.2 mg/L   Blood culture x two cultures. Draw prior to antibiotics.    Collection Time: 06/24/24 11:12 PM    Specimen: Peripheral, Upper Arm, Right; Blood   Result Value Ref Range    Blood Culture, Routine No Growth to date    Blood culture x two cultures. Draw prior to antibiotics.    Collection Time: 06/24/24 11:20 PM    Specimen: Peripheral, Antecubital, Left; Blood   Result Value Ref Range    Blood Culture, Routine No Growth to date    Protime-INR    Collection Time: 06/24/24 11:20 PM   Result  Value Ref Range    Prothrombin Time 20.7 (H) 9.0 - 12.5 sec    INR 2.0 (H) 0.8 - 1.2   ISTAT Lactate    Collection Time: 06/24/24 11:33 PM   Result Value Ref Range    POC Lactate 0.50 0.5 - 2.2 mmol/L    Sample VENOUS     Site Other     Allens Test N/A     DelSys Nasal Can     Mode SPONT     Flow 3.5     Sp02 97    Urinalysis, Reflex to Urine Culture Urine, Clean Catch    Collection Time: 06/24/24 11:34 PM    Specimen: Urine   Result Value Ref Range    Specimen UA Urine, Clean Catch     Color, UA Yellow Yellow, Straw, Dorothy    Appearance, UA Clear Clear    pH, UA 6.0 5.0 - 8.0    Specific Gravity, UA 1.020 1.005 - 1.030    Protein, UA 1+ (A) Negative    Glucose, UA Negative Negative    Ketones, UA Negative Negative    Bilirubin (UA) Negative Negative    Occult Blood UA Negative Negative    Nitrite, UA Negative Negative    Urobilinogen, UA 4.0-6.0 (A) <2.0 EU/dL    Leukocytes, UA Negative Negative   Urinalysis Microscopic    Collection Time: 06/24/24 11:34 PM   Result Value Ref Range    RBC, UA 0 0 - 4 /hpf    WBC, UA 0 0 - 5 /hpf    Bacteria None None-Occ /hpf    Hyaline Casts, UA 0 0-1/lpf /lpf    Microscopic Comment SEE COMMENT    Brain natriuretic peptide    Collection Time: 06/24/24 11:37 PM   Result Value Ref Range     (H) 0 - 99 pg/mL   Troponin I    Collection Time: 06/24/24 11:37 PM   Result Value Ref Range    Troponin I 0.010 0.000 - 0.026 ng/mL   Lactic acid, plasma #2    Collection Time: 06/25/24  3:27 AM   Result Value Ref Range    Lactate (Lactic Acid) 1.2 0.5 - 2.2 mmol/L       Microbiology Results (last 7 days)       Procedure Component Value Units Date/Time    Blood culture x two cultures. Draw prior to antibiotics. [[id number]] Collected: 06/24/24 2320    Order Status: Completed Specimen: Blood from Peripheral, Antecubital, Left Updated: 06/25/24 0712     Blood Culture, Routine No Growth to date    Narrative:      Aerobic and anaerobic    Blood culture x two cultures. Draw prior to  antibiotics. [[id number]] Collected: 06/24/24 4272    Order Status: Completed Specimen: Blood from Peripheral, Upper Arm, Right Updated: 06/25/24 0712     Blood Culture, Routine No Growth to date    Narrative:      Aerobic and anaerobic            Imaging Results              US Abdomen Limited (Final result)  Result time 06/25/24 03:23:14      Final result by Jessica Mejia MD (06/25/24 03:23:14)                   Impression:      Cholelithiasis and distended gallbladder.  No definite secondary sonographic evidence to suggest acute cholecystitis at this time noting there is no gallbladder wall thickening or hyperemia and reported negative sonographic Cervantes sign although the patient received pain medication prior to study which can limit assessment.  Clinical correlation and further assessment as warranted.      Electronically signed by: Jessica Mejia MD  Date:    06/25/2024  Time:    03:23               Narrative:    EXAMINATION:  US ABDOMEN LIMITED    CLINICAL HISTORY:  gallbladder finding on CT;    TECHNIQUE:  Limited ultrasound of the right upper quadrant of the abdomen focused on the biliary system was performed.    COMPARISON:  CT 06/25/2024    FINDINGS:  Gallbladder: The gallbladder is distended containing biliary sludge and a 1.4 cm stone near the gallbladder neck.  No gallbladder wall thickening or hyperemia.  Sonographic Cervantes sign is reported to be negative by the ultrasound technologist noting the patient did received pain medication which can limit assessment.    Biliary system: The common duct is not dilated, measuring 4 mm.  No intrahepatic ductal dilatation.    Pancreas: Largely obscured by bowel gas artifact.  Unremarkable in its visualized extent.    Miscellaneous: No upper abdominal ascites and no abnormalities of the visualized liver.  No right hydronephrosis.  Subcentimeter anechoic structure projecting from the right kidney suggestive of cyst.                                       CT Renal  Stone Study ABD Pelvis WO (Final result)  Result time 06/25/24 00:50:14      Final result by Jessica Mejia MD (06/25/24 00:50:14)                   Impression:      1. No evidence of hydronephrosis/obstructive uropathy bilaterally.  Nonobstructing right nephrolithiasis.  2. Right lower lobe consolidative change superimposed upon a background of diffuse emphysematous change.  Correlation for infectious process advised.  3. Cholelithiasis noting mild distention of the gallbladder.  Further assessment as warranted.  4. Left inguinal hernia containing fat and a portion of the urinary bladder.  5. 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  6. Additional findings as above.      Electronically signed by: Jessica Mejia MD  Date:    06/25/2024  Time:    00:50               Narrative:    EXAMINATION:  CT RENAL STONE STUDY ABD PELVIS WO    CLINICAL HISTORY:  Left lower quadrant pain;    TECHNIQUE:  Low dose axial images, sagittal and coronal reformations were obtained from the lung bases to the pubic symphysis.  Contrast was not administered.    COMPARISON:  Renal stone study 08/07/2021    FINDINGS:  There is right basilar consolidative change.  Correlation for infectious process advised.  Findings superimposed upon a background of emphysematous change.  The visualized portions of the heart and pericardium demonstrate calcific atherosclerosis of the coronary vessels.  Please note evaluation of solid organ parenchyma is limited due to lack of IV contrast.  The liver, spleen, pancreas and adrenal glands demonstrate an unremarkable noncontrast CT appearance.  Gallbladder is distended and contains a calcified stone in its lumen.    There is nonspecific bilateral perinephric fat stranding.  There is a subcentimeter exophytic right renal  hypodensity too small to definitively characterize.  There is a punctate nonobstructing right renal calculus.  No left-sided nephrolithiasis.  There is no hydroureteronephrosis bilaterally noting a small calcific density in the left pelvis appears to be posterior to the left ureter and was present on prior study.  Prostate is within normal limits.  There is a left inguinal hernia containing a small portion of the urinary bladder.    There is significant atherosclerotic plaquing of the abdominal aorta and abdominal visceral vasculature.  There is aneurysmal dilatation of the infrarenal abdominal aorta measuring approximately 2.5 cm.  There is a central linear calcification in this region which could reflect chronic dissection, unchanged from prior exam of 2021.  There are shotty periaortic lymph nodes.    The visualized loops of large and small bowel demonstrate no evidence of obstruction or inflammatory change.  There is diverticulosis without definite evidence to suggest acute diverticulitis at this time.  The appendix is unremarkable.  There is no ascites, free intraperitoneal air or portal venous gas.    Visualized osseous structures are intact with degenerative change.                                       X-Ray Chest AP Portable (Final result)  Result time 06/25/24 00:08:00      Final result by Veronika Desouza MD (06/25/24 00:08:00)                   Impression:      No significant change.      Electronically signed by: Veronika Desouza  Date:    06/25/2024  Time:    00:08               Narrative:    EXAMINATION:  AP PORTABLE CHEST    CLINICAL HISTORY:  Sepsis;    TECHNIQUE:  AP portable chest radiograph was submitted.    COMPARISON:  06/20/2024    FINDINGS:  Median sternotomy changes are present.  AP portable chest radiograph demonstrates a cardiac silhouette within normal limits.  Vascular calcifications are seen at the aortic knob.  There is nonspecific prominence of the interstitium.  There is no  pneumothorax.  There is no focal consolidation, pneumothorax, or pleural effusion.                                        Assessment/Plan:     Sepsis without acute organ dysfunction  This patient does have evidence of infective focus  My overall impression is sepsis.  Source: Respiratory  Antibiotics given-   Antibiotics (72h ago, onward)      Start     Stop Route Frequency Ordered    06/24/24 2315  piperacillin-tazobactam (ZOSYN) 4.5 g in D5W 100 mL IVPB (MB+)  (ED Adult Sepsis Treatment)         06/25/24 2314 IV Every 8 hours (non-standard times) 06/24/24 2307          Latest lactate reviewed-  Recent Labs   Lab 06/24/24  2333 06/25/24  0327   LACTATE  --  1.2   POCLAC 0.50  --      No end-organ damage noted and no fluid challenge required due to lactic acid being normal and blood pressure systolic >90/MAP>65.  Source control achieved by:  Continue IV antibiotics for now.  Order sputum culture and follow up on blood cultures.        Pneumonia of right lower lobe due to infectious organism  Continue antibiotics for now wean oxygen to his home levels of 4 L.  Follow up on sputum and blood cultures.    COPD with acute exacerbation  Continue home oxygen.  Scheduled and p.r.n. DuoNebs ordered.  We will hold off on IV steroids at this time given uncontrolled diabetes as well as sepsis/pneumonic process. Treat pneumonia.    Persistent atrial fibrillation  Patient with Persistent (7 days or more) atrial fibrillation which is controlled currently with Beta Blocker. Patient is currently in sinus rhythm.[id number] Score: 4. Anticoagulation indicated. Anticoagulation done with Coumadin.  INR in appropriate range.  Continue home dose of Coumadin and monitor closely given use of antibiotics at this time .    Poorly controlled type 2 diabetes mellitus with neuropathy  Patient's FSGs are uncontrolled due to hyperglycemia on current medication regimen.  Last A1c reviewed-   Lab Results   Component Value Date    LABA1C 6.8  10/09/2017    HGBA1C 10.7 (H) 12/20/2023     Most recent fingerstick glucose reviewed-   Recent Labs   Lab 06/24/24  2304   POCTGLUCOSE 193*     Current correctional scale  Medium  Maintain anti-hyperglycemic dose as follows-   Antihyperglycemics (From admission, onward)      Start     Stop Route Frequency Ordered    06/25/24 0715  insulin aspart protamine-insulin aspart (NovoLOG 70/30) injection         -- SubQ 2 times daily with meals 06/25/24 0250          Hold Oral hypoglycemics while patient is in the hospital.  Diet adjusted.    Coronary artery disease  No complaints of chest pain at this time.  Continue home medications.  Monitor on telemetry.  Support blood pressure and heart rate.    Chronic kidney disease, stage 3a  Creatinine baseline closer to 1.5-1.6.  Currently mildly elevated at 1.9.  Continue to monitor for now especially monitor for bouts of hypotension given sepsis.  Blood pressure medications are resumed but with hold parameters for low normal blood pressure.  Follow up on repeat labs.      VTE Risk Mitigation (From admission, onward)           Ordered     warfarin tablet 7.5 mg  Every Mon, Wed, Fri 06/25/24 0654     warfarin (COUMADIN) tablet 10 mg  Every Tues, Thurs, Sat, Sun         06/25/24 0654                       Code status: full code         AdmissionCare    Guideline: Pneumonia - INPT, Inpatient    Based on the indications selected for the patient, the bed status of Inpatient was determined to be MET    The following indications were selected as present at the time of evaluation of the patient:      - Clinical Indications for Admission to Inpatient Care            - Admission is indicated for 1 or more of the following:     - Moderate-risk-category or high-risk-category patient (Pneumonia Severity Index class IV or V, or CURB-65 score of 3 or greater) PSI CalculatorCURB-65 Calculator    AdmissionCare documentation entered by: KALEIGH Suarze    [facility], 28th edition,  Copyright © 2024 [facility] [employer], [facility] All Rights Reserved.  [id number]:33-05:00    Trudy Morris MD  Department of Hospital Medicine  [facility] [place] [facility] S[facility]

## 2024-06-25 NOTE — ADMISSIONCARE
AdmissionCare    Guideline: Pneumonia - INPT, Inpatient    Based on the indications selected for the patient, the bed status of Inpatient was determined to be MET    The following indications were selected as present at the time of evaluation of the patient:      - Clinical Indications for Admission to Inpatient Care            - Admission is indicated for 1 or more of the following:     - Moderate-risk-category or high-risk-category patient (Pneumonia Severity Index class IV or V, or CURB-65 score of 3 or greater) PSI CalculatorCURB-65 Calculator    AdmissionCare documentation entered by: KALEIGH Suarez    [place], 28th edition, Copyright © 2024 [place], [facility] All Rights Reserved.  [id number]:12:33-05:00

## 2024-06-25 NOTE — HPI
"(From H&P) "83-year-old  male with past medical history significant for non-insulin-dependent type 2 diabetes, essential hypertension, COPD (on 4L  home oxygen)/ILD, h/o CVA,  permanent atrial fibrillation (on Coumadin 10 mg every day except for 7.5 mg on Monday Wednesday Friday), CAD (s/p 2V CABG 2024 and follow with Dr. Melissa/[facility]) presents to the ER with fevers and reports a generalized weakness and chills.  States increased cough but always has a cough.  Denies any productive sputum.  States he was short of breath and has dyspnea on exertion worsened normal.  With complaining of abdominal pain but no reports of nausea vomiting or diarrhea.     Workup in the ER for mildly elevated white blood count of 10.  Lactic acid was normal.  Liver enzymes were normal.  Pro count was normal.  His INR was 2.  Urine was not consistent with urinary tract infection.  COVID/FLU negative.      Chest x-ray:  Median sternotomy changes are present.  AP portable chest radiograph demonstrates a cardiac silhouette within normal limits.  Vascular calcifications are seen at the aortic knob.  There is nonspecific prominence of the interstitium.  There is no pneumothorax.  There is no focal consolidation, pneumothorax, or pleural effusion.      CT of the abdomen  1.. No evidence of hydronephrosis/obstructive uropathy bilaterally.  Nonobstructing right nephrolithiasis.  2. Right lower lobe consolidative change superimposed upon a background of diffuse emphysematous change.  Correlation for infectious process advised.  3. Cholelithiasis noting mild distention of the gallbladder.  Further assessment as warranted.  4. Left inguinal hernia containing fat and a portion of the urinary bladder.  5. 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   " "  Abdominal ultrasound:   Cholelithiasis and distended gallbladder.  No definite secondary sonographic evidence to suggest acute cholecystitis at this time noting there is no gallbladder wall thickening or hyperemia and reported negative sonographic Cervantes sign although the patient received pain medication prior to study which can limit assessment.  Clinical correlation and further assessment as warranted.      Due to  His port score is 121 he was started  IV abx Vanc and Zosyn to cover for possible Right lower lobe PNA.       Because this patient's clinical condition is guarded due to his sepsis and requires IVF abx and close monitoring of his respiratory status, care in an alternative location isn't clinically appropriate for the reasons stated above.      We have been consulted for further management and inpatient telemetry admission to hospitalist Medicine Service."    Palliative medicine is consulted for support and advance care planning; see ACP section of plan.   "

## 2024-06-25 NOTE — PLAN OF CARE
Problem: Adult Inpatient Plan of Care  Goal: Plan of Care Review  Outcome: Progressing  Goal: Patient-Specific Goal (Individualized)  Outcome: Progressing     Problem: Fall Injury Risk  Goal: Absence of Fall and Fall-Related Injury  Outcome: Progressing     Problem: Sepsis/Septic Shock  Goal: Optimal Coping  Outcome: Progressing

## 2024-06-25 NOTE — ASSESSMENT & PLAN NOTE
No complaints of chest pain at this time.  Continue home medications.  Monitor on telemetry.  Support blood pressure and heart rate.

## 2024-06-25 NOTE — NURSING
[facility] [place]  Nurses Note -- 4 Eyes      6/25/2024       Skin assessed on: Admit      [x] No Pressure Injuries Present    []Prevention Measures Documented    [] Yes LDA  for Pressure Injury Previously documented     [] Yes New Pressure Injury Discovered   [] LDA for New Pressure Injury Added      Attending RN:  Leena Lei RN     Second RN:  DOC Hoffmann

## 2024-06-25 NOTE — ASSESSMENT & PLAN NOTE
- Consult for support and advance care planning in older pt with underlying chronic respiratory failure in hospital with weakness; suspected to have pneumonia. He is currently being treated with pneumonia, though SLP has been consulted due to concern for dysphagia. Chart reviewed in depth prior to visit.   - Met with pt at bedside; during entirety of visit, he was alert and readily able to participate in discussion. Part of the way through visit we were joined by his very supportive daughter, Kalyani, who is pt's preferred MPOA in the event he is unable to make his own medical decisions.   - Introduced role of palliative medicine in pt's care, and learned more about him outside of hospital. He is a retired , and only retired due to vision loss and poorly controlled diabetes - it does not sound like he was ready to give up work, sadly. He has always been active and independent, and tries to do as many of his ADLs around house as possible despite vision loss and lung disease. He lives with one of his three children (son Kenton).   - Medical update provided; discussed that currently we are treating a pneumonia. While his underlying lung disease and diabetes are risk factors for this, we also are aware of possibility of aspiration contributing. Per pt, he does have issues with swallowing at times.   - Discussed plan moving forward, as well as code status. As pt currently feels he has a good quality of life and has been doing fine up until now, he would like to continue with maximal medical therapy, including time-limited trial of life support if needed. I did share transparent concern that if pt were to end up on ventilator, his expected outcome would likely be poor due to his underlying health issues. Daughter verbalized understanding of this, though is supportive of pt's wishes.   - Let them know my team will continue to check in on them throughout hospital stay, and also that I would reach out to SLP to  discuss his care.   - Updated SLP after visit; will follow up with pt and family

## 2024-06-25 NOTE — SUBJECTIVE & OBJECTIVE
Past Medical History:   Diagnosis Date    Allergy     Arthritis     CAD, multiple vessel     DR Melissa    Cataract     Depression     History of colon polyps     Hyperlipidemia     Hypertension     Macular degeneration     Dr Razo for injection, Jennifer for eye    S/P CABG x 2     Type 2 diabetes mellitus with circulatory disorder     Dr. Aquino       Past Surgical History:   Procedure Laterality Date    broken elbow      left    COLONOSCOPY N/A 10/4/2017    Procedure: COLONOSCOPY;  Surgeon: Gabriel Leung MD;  Location: [facility];  Service: Endoscopy;  Laterality: N/A;    EYE SURGERY      cataract    heart bypass      2004    HERNIA REPAIR      right    ROTATOR CUFF REPAIR      right  2004       Review of patient's allergies indicates:   Allergen Reactions    Aricept odt [donepezil] Diarrhea and Nausea And Vomiting    Codeine Nausea Only     weak    Ranexa [ranolazine]        Medications:  Continuous Infusions:  Scheduled Meds:   albuterol-ipratropium  3 mL Nebulization Q6H WAKE    atorvastatin  40 mg Oral QHS    doxycycline  100 mg Oral Q12H    DULoxetine  60 mg Oral Daily    fluticasone propionate  1 spray Each Nostril BID    furosemide  40 mg Oral Daily    gabapentin  900 mg Oral BID    insulin aspart protamine-insulin aspart  5 Units Subcutaneous BIDWM    piperacillin-tazobactam (Zosyn) IV (PEDS and ADULTS) (extended infusion is not appropriate)  4.5 g Intravenous Q8H    warfarin  10 mg Oral Every Tues, Thurs, Sat, Sun    [START ON 6/26/2024] warfarin  7.5 mg Oral Every Mon, Wed, Fri     PRN Meds:  Current Facility-Administered Medications:     albuterol-ipratropium, 3 mL, Nebulization, Q4H PRN    guaiFENesin 100 mg/5 ml, 200 mg, Oral, Q4H PRN    HYDROcodone-acetaminophen, 1 tablet, Oral, Q8H PRN    nitroGLYCERIN, 0.4 mg, Sublingual, Q5 Min PRN    Family History       Problem Relation (Age of Onset)    Arthritis Mother    Cancer Brother, Maternal Grandfather    Diabetes Mother, Maternal Aunt, Maternal  Uncle, Paternal Aunt    Heart attack Father    Heart disease Maternal Uncle, Paternal Aunt, Paternal Uncle, Maternal Grandmother    Stroke Mother    Throat cancer Brother          Tobacco Use    Smoking status: Former     Current packs/day: 0.00     Average packs/day: 3.0 packs/day for 45.0 years (135.0 ttl pk-yrs)     Types: Cigarettes     Start date: 1959     Quit date: 2004     Years since quittin.1    Smokeless tobacco: Former   Substance and Sexual Activity    Alcohol use: Yes     Comment: was heavy drinker 35 years ago, rare use now    Drug use: No    Sexual activity: Yes     Partners: Female       Review of Systems   Constitutional:  Positive for fatigue.   Respiratory:  Positive for cough and shortness of breath.      Objective:     Vital Signs (Most Recent):  Temp: 97.9 °F (36.6 °C) (24 1119)  Pulse: 84 (24 1119)  Resp: 18 (24 1119)  BP: (!) 100/55 (24 1119)  SpO2: 96 % (24 1119) Vital Signs (24h Range):  Temp:  [97.8 °F (36.6 °C)-102.7 °F (39.3 °C)] 97.9 °F (36.6 °C)  Pulse:  [] 84  Resp:  [16-20] 18  SpO2:  [95 %-99 %] 96 %  BP: (100-131)/(55-66) 100/55     Weight: 93 kg (205 lb)  Body mass index is 31.17 kg/m².       Physical Exam  Constitutional:       Comments: Appears stated age, sitting up in chair, chronically ill-appearing, in no distress   Eyes:      Comments: Bilateral vision impairment        Significant Labs: All pertinent labs within the past 24 hours have been reviewed.  CBC:   Recent Labs   Lab 24  2311   WBC 10.04   HGB 11.4*   HCT 34.9*   MCV 88        BMP:  Recent Labs   Lab 24  2311   *   *   K 5.8*   CL 99   CO2 23   BUN 32*   CREATININE 1.9*   CALCIUM 8.8     LFT:  Lab Results   Component Value Date    AST 13 2024    ALKPHOS 85 2024    BILITOT 0.6 2024     Albumin:   Albumin   Date Value Ref Range Status   2024 3.2 (L) 3.5 - 5.2 g/dL Final   2018 3.9 3.6 - 5.1 Final      Comment:     Dr. Philippe Aquino(Endocrinology)     Protein:   Total Protein   Date Value Ref Range Status   06/24/2024 7.1 6.0 - 8.4 g/dL Final     Lactic acid:   Lab Results   Component Value Date    LACTATE 1.2 06/25/2024       Significant Imaging: I have reviewed all pertinent imaging results/findings within the past 24 hours.

## 2024-06-25 NOTE — ASSESSMENT & PLAN NOTE
Continue home oxygen.  Scheduled and p.r.n. Rabia ordered.  We will hold off on IV steroids at this time given uncontrolled diabetes as well as sepsis/pneumonic process. Treat pneumonia.

## 2024-06-25 NOTE — ASSESSMENT & PLAN NOTE
Continue antibiotics for now wean oxygen to his home levels of 4 L.  Follow up on sputum and blood cultures.

## 2024-06-25 NOTE — CONSULTS
[facility] Surg  Palliative Medicine  Consult Note    Patient Name: Percy Ramirez  MRN: [id number]  Admission Date: 6/24/2024  Hospital Length of Stay: 0 days  Code Status: Full Code   Attending Provider: Marcela Mayers, Sherif  Consulting Provider: Marci Dominguez MD  Primary Care Physician: Kasie Edmondson MD  Principal Problem:<principal problem not specified>    Patient information was obtained from patient, relative(s), past medical records, ER records, and primary team.      Inpatient consult to Palliative Care  Consult performed by: Marci Dominguez MD  Consult ordered by: Marcela Mayers MD  Reason for consult: Advance Care Planning        Assessment/Plan:     Advance Care Planning    - Consult for support and advance care planning in older pt with underlying chronic respiratory failure in hospital with weakness; suspected to have pneumonia. He is currently being treated with pneumonia, though SLP has been consulted due to concern for dysphagia. Chart reviewed in depth prior to visit.   - Met with pt at bedside; during entirety of visit, he was alert and readily able to participate in discussion. Part of the way through visit we were joined by his very supportive daughter, Kalyani, who is pt's preferred MPOA in the event he is unable to make his own medical decisions.   - Introduced role of palliative medicine in pt's care, and learned more about him outside of hospital. He is a retired , and only retired due to vision loss and poorly controlled diabetes - it does not sound like he was ready to give up work, sadly. He has always been active and independent, and tries to do as many of his ADLs around house as possible despite vision loss and lung disease. He lives with one of his three children (son Kenton).   - Medical update provided; discussed that currently we are treating a pneumonia. While his underlying lung disease and diabetes are risk factors for this, we also are  aware of possibility of aspiration contributing. Per pt, he does have issues with swallowing at times.   - Discussed plan moving forward, as well as code status. As pt currently feels he has a good quality of life and has been doing fine up until now, he would like to continue with maximal medical therapy, including time-limited trial of life support if needed. I did share transparent concern that if pt were to end up on ventilator, his expected outcome would likely be poor due to his underlying health issues. Daughter verbalized understanding of this, though is supportive of pt's wishes.   - Let them know my team will continue to check in on them throughout hospital stay, and also that I would reach out to SLP to discuss his care.   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 Discussed link between aspiration and pneumonia with pt and his daughter. Will have further conversation about this following SLP eval.     Thank you for your consult. I will follow-up with patient. Please contact us if you have any additional questions.    CHRISTIANO De Jesus  Palliative Medicine Staff  "  [phone]    > 50% of  70 min visit spent in chart review, face to face discussion of symptom assessment, coordination of care with other specialists, burden/ benefit of various approaches of treatment.      Subjective:     HPI:   (From H&P) "83-year-old  male with past medical history significant for non-insulin-dependent type 2 diabetes, essential hypertension, COPD (on 4L  home oxygen)/ILD, h/o CVA,  permanent atrial fibrillation (on Coumadin 10 mg every day except for 7.5 mg on Monday Wednesday Friday), CAD (s/p 2V CABG 2024 and follow with Dr. Melissa[facility]) presents to the ER with fevers and reports a generalized weakness and chills.  States increased cough but always has a cough.  Denies any productive sputum.  States he was short of breath and has dyspnea on exertion worsened normal.  With complaining of abdominal pain but no reports of nausea vomiting or diarrhea.     Workup in the ER for mildly elevated white blood count of 10.  Lactic acid was normal.  Liver enzymes were normal.  Pro count was normal.  His INR was 2.  Urine was not consistent with urinary tract infection.  COVID/FLU negative.      Chest x-ray:  Median sternotomy changes are present.  AP portable chest radiograph demonstrates a cardiac silhouette within normal limits.  Vascular calcifications are seen at the aortic knob.  There is nonspecific prominence of the interstitium.  There is no pneumothorax.  There is no focal consolidation, pneumothorax, or pleural effusion.      CT of the abdomen  1.. No evidence of hydronephrosis/obstructive uropathy bilaterally.  Nonobstructing right nephrolithiasis.  2. Right lower lobe consolidative change superimposed upon a background of diffuse emphysematous change.  Correlation for infectious process advised.  3. Cholelithiasis noting mild distention of the gallbladder.  Further assessment as warranted.  4. Left inguinal hernia containing fat and a portion of the urinary " "bladder.  5. 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     Abdominal ultrasound:   Cholelithiasis and distended gallbladder.  No definite secondary sonographic evidence to suggest acute cholecystitis at this time noting there is no gallbladder wall thickening or hyperemia and reported negative sonographic Cervantes sign although the patient received pain medication prior to study which can limit assessment.  Clinical correlation and further assessment as warranted.      Due to  His port score is 121 he was started  IV abx Vanc and Zosyn to cover for possible Right lower lobe PNA.       Because this patient's clinical condition is guarded due to his sepsis and requires IVF abx and close monitoring of his respiratory status, care in an alternative location isn't clinically appropriate for the reasons stated above.      We have been consulted for further management and inpatient telemetry admission to hospitalist Medicine Service."    Palliative medicine is consulted for support and advance care planning; see ACP section of plan.       Past Medical History:   Diagnosis Date    Allergy     Arthritis     CAD, multiple vessel     DR Melissa    Cataract     Depression     History of colon polyps     Hyperlipidemia     Hypertension     Macular degeneration     Dr Razo for injection, Jennifer for eye    S/P CABG x 2     Type 2 diabetes mellitus with circulatory disorder     Dr. Aquino       Past Surgical History:   Procedure Laterality Date    broken elbow      left    COLONOSCOPY N/A 10/4/2017    Procedure: COLONOSCOPY;  Surgeon: Gabriel Leung MD;  Location: [facility];  Service: Endoscopy;  Laterality: N/A;    EYE SURGERY      cataract    heart bypass      2004    HERNIA REPAIR      right    ROTATOR CUFF REPAIR      right  2004 "       Review of patient's allergies indicates:   Allergen Reactions    Aricept odt [donepezil] Diarrhea and Nausea And Vomiting    Codeine Nausea Only     weak    Ranexa [ranolazine]        Medications:  Continuous Infusions:  Scheduled Meds:   albuterol-ipratropium  3 mL Nebulization Q6H WAKE    atorvastatin  40 mg Oral QHS    doxycycline  100 mg Oral Q12H    DULoxetine  60 mg Oral Daily    fluticasone propionate  1 spray Each Nostril BID    furosemide  40 mg Oral Daily    gabapentin  900 mg Oral BID    insulin aspart protamine-insulin aspart  5 Units Subcutaneous BIDWM    piperacillin-tazobactam (Zosyn) IV (PEDS and ADULTS) (extended infusion is not appropriate)  4.5 g Intravenous Q8H    warfarin  10 mg Oral Every Tues, Thurs, Sat, Sun    [START ON 2024] warfarin  7.5 mg Oral Every Mon, Wed, Fri     PRN Meds:  Current Facility-Administered Medications:     albuterol-ipratropium, 3 mL, Nebulization, Q4H PRN    guaiFENesin 100 mg/5 ml, 200 mg, Oral, Q4H PRN    HYDROcodone-acetaminophen, 1 tablet, Oral, Q8H PRN    nitroGLYCERIN, 0.4 mg, Sublingual, Q5 Min PRN    Family History       Problem Relation (Age of Onset)    Arthritis Mother    Cancer Brother, Maternal Grandfather    Diabetes Mother, Maternal Aunt, Maternal Uncle, Paternal Aunt    Heart attack Father    Heart disease Maternal Uncle, Paternal Aunt, Paternal Uncle, Maternal Grandmother    Stroke Mother    Throat cancer Brother          Tobacco Use    Smoking status: Former     Current packs/day: 0.00     Average packs/day: 3.0 packs/day for 45.0 years (135.0 ttl pk-yrs)     Types: Cigarettes     Start date: 1959     Quit date: 2004     Years since quittin.1    Smokeless tobacco: Former   Substance and Sexual Activity    Alcohol use: Yes     Comment: was heavy drinker 35 years ago, rare use now    Drug use: No    Sexual activity: Yes     Partners: Female       Review of Systems   Constitutional:  Positive for fatigue.   Respiratory:   Positive for cough and shortness of breath.      Objective:     Vital Signs (Most Recent):  Temp: 97.9 °F (36.6 °C) (06/25/24 1119)  Pulse: 84 (06/25/24 1119)  Resp: 18 (06/25/24 1119)  BP: (!) 100/55 (06/25/24 1119)  SpO2: 96 % (06/25/24 1119) Vital Signs (24h Range):  Temp:  [97.8 °F (36.6 °C)-102.7 °F (39.3 °C)] 97.9 °F (36.6 °C)  Pulse:  [] 84  Resp:  [16-20] 18  SpO2:  [95 %-99 %] 96 %  BP: (100-131)/(55-66) 100/55     Weight: 93 kg (205 lb)  Body mass index is 31.17 kg/m².       Physical Exam  Constitutional:       Comments: Appears stated age, sitting up in chair, chronically ill-appearing, in no distress   Eyes:      Comments: Bilateral vision impairment        Significant Labs: All pertinent labs within the past 24 hours have been reviewed.  CBC:   Recent Labs   Lab 06/24/24  2311   WBC 10.04   HGB 11.4*   HCT 34.9*   MCV 88        BMP:  Recent Labs   Lab 06/24/24  2311   *   *   K 5.8*   CL 99   CO2 23   BUN 32*   CREATININE 1.9*   CALCIUM 8.8     LFT:  Lab Results   Component Value Date    AST 13 06/24/2024    ALKPHOS 85 06/24/2024    BILITOT 0.6 06/24/2024     Albumin:   Albumin   Date Value Ref Range Status   06/24/2024 3.2 (L) 3.5 - 5.2 g/dL Final   01/23/2018 3.9 3.6 - 5.1 Final     Comment:     Dr. Philippe Aquino(Endocrinology)     Protein:   Total Protein   Date Value Ref Range Status   06/24/2024 7.1 6.0 - 8.4 g/dL Final     Lactic acid:   Lab Results   Component Value Date    LACTATE 1.2 06/25/2024       Significant Imaging: I have reviewed all pertinent imaging results/findings within the past 24 hours.

## 2024-06-25 NOTE — PT/OT/SLP EVAL
"Speech Language Pathology Evaluation  Bedside Swallow    Patient Name:  Percy Ramirez   MRN:  [id number]  Admitting Diagnosis: COPD, fatigue    Recommendations:                 General Recommendations:  Dysphagia therapy, consider outpatient GI if complaints persist.   Diet recommendations:  Soft & Bite Sized Diet - IDDSI Level 6, Thin   Aspiration Precautions:  good oral care    General Precautions: Standard,    Communication strategies:  none    Assessment:     Percy Ramirez is a 83 y.o. male with dx he presents with COPD and increased fatigue, he presents with functional oral and pharyngeal phases of swallow at bedside. Pt reporting periodic globus sensation of solids and chronic cough with frequent "tickle" with swallow of specifically carbonated beverages, these reports are suspicious for LPR.     History:     Past Medical History:   Diagnosis Date    Allergy     Arthritis     CAD, multiple vessel     DR Melissa    Cataract     Depression     History of colon polyps     Hyperlipidemia     Hypertension     Macular degeneration     Dr Razo for injection, Jennifer for eye    S/P CABG x 2     Type 2 diabetes mellitus with circulatory disorder     Dr. Aquino       Past Surgical History:   Procedure Laterality Date    broken elbow      left    COLONOSCOPY N/A 10/4/2017    Procedure: COLONOSCOPY;  Surgeon: Gabriel Leung MD;  Location: [facility];  Service: Endoscopy;  Laterality: N/A;    EYE SURGERY      cataract    heart bypass      2004    HERNIA REPAIR      right    ROTATOR CUFF REPAIR      right  2004       Chest X-Rays: 6/20 Linear and reticular opacities in the mid to lower lung zones in keeping with chronic interstitial changes. This appears similar to prior, without definite new focal airspace opacity identified. No significant pleural fluid or pneumothorax.     Prior diet: unrestricted     Occupation/hobbies/homemaking: retired     Subjective   Pt reporting frequent sensation of solids " "getting "stuck" in throat he reports when this happens he "pushes" stasis "down" with a cookie or a cracker. He reports "tickle" in throat and chronic cough.  Patient goals: initiate po    Pain/Comfort:  Pain Rating 1: 0/10  Pain Rating Post-Intervention 1: 0/10    Respiratory Status: Nasal cannula, flow 3 L/min    Objective:     Oral Musculature Evaluation  Oral Musculature: WFL  Dentition: teeth in poor condition  Secretion Management: adequate  Mucosal Quality: good, coated tongue  Mandibular Strength and Mobility: WFL  Oral Labial Strength and Mobility: [facility]  Lingual Strength and Mobility: [facility]  Voice Prior to PO Intake: [facility]  Oral Musculature Comments: wfl    Bedside Swallow Eval:   Consistencies Assessed:  Thin liquids ~4oz self presented vai straw  Puree x2  Solids x1 cracker      Oral Phase:   WFL    Pharyngeal Phase:   no overt clinical signs/symptoms of aspiration    Compensatory Strategies  None    Treatment: please note silent aspiration cannot be r/o at bedside.     Goals:   Multidisciplinary Problems       SLP Goals          Problem: SLP    Goal Priority Disciplines Outcome   SLP Goal    Low SLP    Description: Pt will tolerate IDDSI 6 with thin liquids x2 consecutive sessions (1 of 2 met 6/25)                       Plan:     Patient to be seen:  2 x/week   Plan of Care expires:  06/26/24  Plan of Care reviewed with:  patient   SLP Follow-Up:  Yes       Discharge recommendations:  No Therapy Indicated   Barriers to Discharge:  None    Time Tracking:     SLP Treatment Date:   06/25/24  Speech Start Time:  1250  Speech Stop Time:  1300     Speech Total Time (min):  10 min    Billable Minutes: Eval Swallow and Oral Function 10    06/25/2024         "

## 2024-06-25 NOTE — ASSESSMENT & PLAN NOTE
Patient with Persistent (7 days or more) atrial fibrillation which is controlled currently with Beta Blocker. Patient is currently in sinus rhythm.[id number] Score: 4. Anticoagulation indicated. Anticoagulation done with Coumadin.  INR in appropriate range.  Continue home dose of Coumadin and monitor closely given use of antibiotics at this time .

## 2024-06-25 NOTE — ED PROVIDER NOTES
Encounter Date: 6/24/2024    SCRIBE #1 NOTE: I, Nicole Maldonado, am scribing for, and in the presence of,  Bronson Paulson MD.       History     Chief Complaint   Patient presents with    Fatigue     Pt arrived via ems, pt chief complaint is Weakness. Pt states has been feeling weak for past 2 days and having a fever as well.      82 yo M w/ PMHx of CAD s/p CABG x2, HTN, HLD, macular degeneration, depression, groin hernia, COPD on chronic daily 4L home O2, presenting to the ED for fatigue. Patient reports 2 day hx of generalized fatigue, weakness, and dizziness. Also reports mild dyspnea and is on constant 4L home O2 d/t his COPD. Also report LLQ abdominal pain w/ coughing, but denies any cough. He does note some mild neck pain. No other exacerbating or alleviating factors. Denies dysuria, hematuria, rashes or wounds, headaches, or other associated symptoms. No recent abx.     The history is provided by the patient.     Review of patient's allergies indicates:   Allergen Reactions    Aricept odt [donepezil] Diarrhea and Nausea And Vomiting    Codeine Nausea Only     weak    Ranexa [ranolazine]      Past Medical History:   Diagnosis Date    Allergy     Arthritis     CAD, multiple vessel     DR Melissa    Cataract     Depression     History of colon polyps     Hyperlipidemia     Hypertension     Macular degeneration     Dr Razo for injection, Jennifer for eye    S/P CABG x 2     Type 2 diabetes mellitus with circulatory disorder     Dr. Aquino     Past Surgical History:   Procedure Laterality Date    broken elbow      left    COLONOSCOPY N/A 10/4/2017    Procedure: COLONOSCOPY;  Surgeon: Gabriel Leung MD;  Location: [facility];  Service: Endoscopy;  Laterality: N/A;    EYE SURGERY      cataract    heart bypass      2004    HERNIA REPAIR      right    ROTATOR CUFF REPAIR      right  2004     Family History   Problem Relation Name Age of Onset    Arthritis Mother      Diabetes Mother      Stroke Mother      Heart  attack Father      Cancer Brother      Throat cancer Brother      Diabetes Maternal Aunt      Diabetes Maternal Uncle      Heart disease Maternal Uncle      Diabetes Paternal Aunt      Heart disease Paternal Aunt      Heart disease Paternal Uncle      Heart disease Maternal Grandmother      Cancer Maternal Grandfather       Social History     Tobacco Use    Smoking status: Former     Current packs/day: 0.00     Average packs/day: 3.0 packs/day for 45.0 years (135.0 ttl pk-yrs)     Types: Cigarettes     Start date: 1959     Quit date: 2004     Years since quittin.1    Smokeless tobacco: Former   Substance Use Topics    Alcohol use: Yes     Comment: was heavy drinker 35 years ago, rare use now    Drug use: No     Review of Systems   Constitutional:  Positive for fatigue. Negative for chills and fever.   HENT:  Negative for facial swelling and sore throat.    Eyes:  Negative for visual disturbance.   Respiratory:  Positive for shortness of breath. Negative for cough.    Cardiovascular:  Negative for chest pain and palpitations.   Gastrointestinal:  Positive for abdominal pain. Negative for constipation, diarrhea, nausea and vomiting.   Genitourinary:  Negative for dysuria and hematuria.   Musculoskeletal:  Positive for neck pain. Negative for back pain.   Skin:  Negative for rash.   Neurological:  Positive for dizziness and weakness. Negative for headaches.   Hematological:  Does not bruise/bleed easily.   Psychiatric/Behavioral: Negative.         Physical Exam     Initial Vitals [24 2154]   BP Pulse Resp Temp SpO2   124/62 110 18 99.5 °F (37.5 °C) 99 %      MAP       --         Physical Exam    Nursing note and vitals reviewed.  Constitutional: He appears well-developed and well-nourished. He is not diaphoretic. No distress.   HENT:   Head: Normocephalic and atraumatic.   Right Ear: External ear normal.   Left Ear: External ear normal.   Nose: Nose normal.   Eyes: Conjunctivae and EOM are normal.  Pupils are equal, round, and reactive to light. No scleral icterus.   Neck: Neck supple. No tracheal deviation present.   Cardiovascular:  Normal rate, regular rhythm and normal heart sounds.     Exam reveals no gallop and no friction rub.       No murmur heard.  Pulmonary/Chest: Breath sounds normal. No respiratory distress.   Abdominal: Abdomen is soft. Bowel sounds are normal. There is no abdominal tenderness.   No CVA tenderness bl. LLQ abdominal ttp, no rebound or guarding    Musculoskeletal:      Cervical back: Neck supple.     Neurological: He is alert and oriented to person, place, and time. GCS score is 15. GCS eye subscore is 4. GCS verbal subscore is 5. GCS motor subscore is 6.   CALDERON, nl strength and sensation intact   Skin: Skin is warm and dry. No rash noted.   Psychiatric: He has a normal mood and affect. Thought content normal.         ED Course   Procedures  Labs Reviewed   CBC W/ AUTO DIFFERENTIAL - Abnormal; Notable for the following components:       Result Value    RBC 3.95 (*)     Hemoglobin 11.4 (*)     Hematocrit 34.9 (*)     Gran # (ANC) 8.0 (*)     Immature Grans (Abs) 0.05 (*)     Lymph # 0.8 (*)     Mono # 1.2 (*)     Gran % 79.2 (*)     Lymph % 7.8 (*)     All other components within normal limits   COMPREHENSIVE METABOLIC PANEL - Abnormal; Notable for the following components:    Sodium 133 (*)     Potassium 5.8 (*)     Glucose 198 (*)     BUN 32 (*)     Creatinine 1.9 (*)     Albumin 3.2 (*)     ALT 9 (*)     eGFR 35 (*)     All other components within normal limits   URINALYSIS, REFLEX TO URINE CULTURE - Abnormal; Notable for the following components:    Protein, UA 1+ (*)     Urobilinogen, UA 4.0-6.0 (*)     All other components within normal limits    Narrative:     Specimen Source->Urine   C-REACTIVE PROTEIN - Abnormal; Notable for the following components:    CRP 61.5 (*)     All other components within normal limits   B-TYPE NATRIURETIC PEPTIDE - Abnormal; Notable for the  following components:     (*)     All other components within normal limits   PROTIME-INR - Abnormal; Notable for the following components:    Prothrombin Time 20.7 (*)     INR 2.0 (*)     All other components within normal limits   POCT GLUCOSE - Abnormal; Notable for the following components:    POCT Glucose 193 (*)     All other components within normal limits   PROCALCITONIN   TROPONIN I   PROTIME-INR   URINALYSIS MICROSCOPIC    Narrative:     Specimen Source->Urine   POCT INFLUENZA A/B MOLECULAR   SARS-COV-2 RDRP GENE   ISTAT LACTATE   POCT GLUCOSE MONITORING CONTINUOUS          Imaging Results              US Abdomen Limited (Final result)  Result time 06/25/24 03:23:14      Final result by Jessica Mejia MD (06/25/24 03:23:14)                   Impression:      Cholelithiasis and distended gallbladder.  No definite secondary sonographic evidence to suggest acute cholecystitis at this time noting there is no gallbladder wall thickening or hyperemia and reported negative sonographic Cervantes sign although the patient received pain medication prior to study which can limit assessment.  Clinical correlation and further assessment as warranted.      Electronically signed by: Jessica Mejia MD  Date:    06/25/2024  Time:    03:23               Narrative:    EXAMINATION:  US ABDOMEN LIMITED    CLINICAL HISTORY:  gallbladder finding on CT;    TECHNIQUE:  Limited ultrasound of the right upper quadrant of the abdomen focused on the biliary system was performed.    COMPARISON:  CT 06/25/2024    FINDINGS:  Gallbladder: The gallbladder is distended containing biliary sludge and a 1.4 cm stone near the gallbladder neck.  No gallbladder wall thickening or hyperemia.  Sonographic Cervantes sign is reported to be negative by the ultrasound technologist noting the patient did received pain medication which can limit assessment.    Biliary system: The common duct is not dilated, measuring 4 mm.  No intrahepatic ductal  dilatation.    Pancreas: Largely obscured by bowel gas artifact.  Unremarkable in its visualized extent.    Miscellaneous: No upper abdominal ascites and no abnormalities of the visualized liver.  No right hydronephrosis.  Subcentimeter anechoic structure projecting from the right kidney suggestive of cyst.                                       CT Renal Stone Study ABD Pelvis WO (Final result)  Result time 06/25/24 00:50:14      Final result by Jessica Mejia MD (06/25/24 00:50:14)                   Impression:      1. No evidence of hydronephrosis/obstructive uropathy bilaterally.  Nonobstructing right nephrolithiasis.  2. Right lower lobe consolidative change superimposed upon a background of diffuse emphysematous change.  Correlation for infectious process advised.  3. Cholelithiasis noting mild distention of the gallbladder.  Further assessment as warranted.  4. Left inguinal hernia containing fat and a portion of the urinary bladder.  5. 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  6. Additional findings as above.      Electronically signed by: Jessica Mejia MD  Date:    06/25/2024  Time:    00:50               Narrative:    EXAMINATION:  CT RENAL STONE STUDY ABD PELVIS WO    CLINICAL HISTORY:  Left lower quadrant pain;    TECHNIQUE:  Low dose axial images, sagittal and coronal reformations were obtained from the lung bases to the pubic symphysis.  Contrast was not administered.    COMPARISON:  Renal stone study 08/07/2021    FINDINGS:  There is right basilar consolidative change.  Correlation for infectious process advised.  Findings superimposed upon a background of emphysematous change.  The visualized portions of the heart and pericardium demonstrate calcific atherosclerosis of the coronary vessels.  Please note evaluation  of solid organ parenchyma is limited due to lack of IV contrast.  The liver, spleen, pancreas and adrenal glands demonstrate an unremarkable noncontrast CT appearance.  Gallbladder is distended and contains a calcified stone in its lumen.    There is nonspecific bilateral perinephric fat stranding.  There is a subcentimeter exophytic right renal hypodensity too small to definitively characterize.  There is a punctate nonobstructing right renal calculus.  No left-sided nephrolithiasis.  There is no hydroureteronephrosis bilaterally noting a small calcific density in the left pelvis appears to be posterior to the left ureter and was present on prior study.  Prostate is within normal limits.  There is a left inguinal hernia containing a small portion of the urinary bladder.    There is significant atherosclerotic plaquing of the abdominal aorta and abdominal visceral vasculature.  There is aneurysmal dilatation of the infrarenal abdominal aorta measuring approximately 2.5 cm.  There is a central linear calcification in this region which could reflect chronic dissection, unchanged from prior exam of 2021.  There are shotty periaortic lymph nodes.    The visualized loops of large and small bowel demonstrate no evidence of obstruction or inflammatory change.  There is diverticulosis without definite evidence to suggest acute diverticulitis at this time.  The appendix is unremarkable.  There is no ascites, free intraperitoneal air or portal venous gas.    Visualized osseous structures are intact with degenerative change.                                       X-Ray Chest AP Portable (Final result)  Result time 06/25/24 00:08:00      Final result by Veronika Desouza MD (06/25/24 00:08:00)                   Impression:      No significant change.      Electronically signed by: Veronika Desouza  Date:    06/25/2024  Time:    00:08               Narrative:    EXAMINATION:  AP PORTABLE CHEST    CLINICAL  HISTORY:  Sepsis;    TECHNIQUE:  AP portable chest radiograph was submitted.    COMPARISON:  06/20/2024    FINDINGS:  Median sternotomy changes are present.  AP portable chest radiograph demonstrates a cardiac silhouette within normal limits.  Vascular calcifications are seen at the aortic knob.  There is nonspecific prominence of the interstitium.  There is no pneumothorax.  There is no focal consolidation, pneumothorax, or pleural effusion.                                       Medications   nitroGLYCERIN SL tablet 0.4 mg (has no administration in time range)   insulin aspart protamine-insulin aspart (NovoLOG 70/30) injection (5 Units Subcutaneous Given 6/25/24 1708)   HYDROcodone-acetaminophen 7.5-325 mg per tablet 1 tablet (has no administration in time range)   gabapentin capsule 900 mg (900 mg Oral Given 6/25/24 2105)   furosemide tablet 40 mg (40 mg Oral Given 6/25/24 0905)   fluticasone propionate 50 mcg/actuation nasal spray 50 mcg (50 mcg Each Nostril Given 6/25/24 2105)   DULoxetine DR capsule 60 mg (60 mg Oral Given 6/25/24 0905)   atorvastatin tablet 40 mg (40 mg Oral Given 6/25/24 2105)   warfarin (COUMADIN) tablet 10 mg (10 mg Oral Given 6/25/24 1705)   warfarin tablet 7.5 mg (has no administration in time range)   albuterol-ipratropium 2.5 mg-0.5 mg/3 mL nebulizer solution 3 mL (3 mLs Nebulization Not Given 6/25/24 2000)   albuterol-ipratropium 2.5 mg-0.5 mg/3 mL nebulizer solution 3 mL (has no administration in time range)   guaiFENesin 100 mg/5 ml syrup 200 mg (has no administration in time range)   doxycycline tablet 100 mg (100 mg Oral Given 6/25/24 2105)   acetaminophen tablet 1,000 mg (1,000 mg Oral Given 6/24/24 2259)   piperacillin-tazobactam (ZOSYN) 4.5 g in D5W 100 mL IVPB (MB+) (0 g Intravenous Stopped 6/25/24 1543)   vancomycin (VANCOCIN) 1,750 mg in D5W 500 mL IVPB (0 mg Intravenous Stopped 6/25/24 0212)   sodium chloride 0.9% bolus 1,000 mL 1,000 mL (0 mLs Intravenous Stopped 6/25/24  0030)   calcium gluconate 1 g in NS IVPB (premixed) (0 g Intravenous Stopped 6/25/24 0149)   dextrose 10% bolus 250 mL 250 mL (0 mLs Intravenous Stopped 6/25/24 0245)   insulin regular injection 5 Units 0.05 mL (5 Units Intravenous Given 6/25/24 0143)   sodium zirconium cyclosilicate packet 10 g (10 g Oral Given 6/25/24 0245)   clopidogreL tablet 75 mg (75 mg Oral Given 6/25/24 0427)     Medical Decision Making  Amount and/or Complexity of Data Reviewed  Labs: ordered. Decision-making details documented in ED Course.  Radiology: ordered. Decision-making details documented in ED Course.  ECG/medicine tests: ordered and independent interpretation performed. Decision-making details documented in ED Course.    Risk  OTC drugs.  Prescription drug management.  Decision regarding hospitalization.            Scribe Attestation:   Scribe #1: I performed the above scribed service and the documentation accurately describes the services I performed. I attest to the accuracy of the note.        ED Course as of 06/26/24 0058   Mon Jun 24, 2024   2307 Differential Diagnosis includes, but is not limited to:  AAA, aortic dissection, mesenteric ischemia, perforated viscous, MI/ACS, SBO/volvulus, incarcerated/strangulated hernia, intussusception, ileus, appendicitis, cholecystitis, cholangitis, diverticulitis, esophagitis, hepatitis, nephrolithiasis, pancreatitis, gastroenteritis, colitis, IBD/IBS, biliary colic, GERD, PUD, constipation, UTI/pyelonephritis,  disorder.    [CC]   Tue Jun 25, 2024   0011 X-Ray Chest AP Portable     FINDINGS:  Median sternotomy changes are present.  AP portable chest radiograph demonstrates a cardiac silhouette within normal limits.  Vascular calcifications are seen at the aortic knob.  There is nonspecific prominence of the interstitium.  There is no pneumothorax.  There is no focal consolidation, pneumothorax, or pleural effusion.     Impression:     No significant change.   [CC]   0011 X-Ray Chest AP  Portable  Independent interpretation of x-ray.  No acute cardiopulmonary process noted. [CC]   0049 Temp(!): 102.7 °F (39.3 °C) [CC]   0107 Impression:     1. No evidence of hydronephrosis/obstructive uropathy bilaterally.  Nonobstructing right nephrolithiasis.  2. Right lower lobe consolidative change superimposed upon a background of diffuse emphysematous change.  Correlation for infectious process advised.  3. Cholelithiasis noting mild distention of the gallbladder.  Further assessment as warranted.  4. Left inguinal hernia containing fat and a portion of the urinary bladder.  5. 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  6. Additional findings as above.   [CC]   0155 Port score is 121 [CC]   0159 Patient was an 83-year-old male presenting to the emergency department for evaluation of generalized weakness and chills.  Patient found to have a fever of 102.7.  Generally weak on exam but no focal weakness.  I am not concerned for CVA.  CRP is elevated.  Profile is normal.  He noted some abdominal pain, left lower quadrant.  No obvious findings of diverticulitis on CT.  Mildly dilated gallbladder without significant right upper quadrant pain.  I will add a right upper quadrant ultrasound for further evaluation.  Chest x-ray is unremarkable although CT of the abdomen does show findings concerning for a pneumonia in the right lower lobe.  Patient has a history of COPD, CHF.  He was on oxygen 4 L at home.  He was satting well in his home oxygen.  He was not respiratory distress.  His port score is 121.  No leukocytosis.  He was tachycardic on arrival.  SIRS criteria met.  I covered him with vancomycin and Zosyn initially.  INR is 2.0..  Patient is on Coumadin for atrial fibrillation.  Influenza and COVID are negative.  Mild anemia,  transfusion not indicated.  Platelet count normal.  Mild hyperkalemia, 5.8.  Shifted with insulin and given dextrose.  He was given a g of calcium gluconate.  Creatinine elevated 1.9.  Mild renal insufficiency.  UA isn't indicative of UTI.  Lactic is normal.  He received 1 L of fluid in the emergency department given history of CHF.  EKG is nonischemic.  Denies chest pain, troponin normal, doubt ACS.    Plan to admit for sepsis and pneumonia with an elevated port score. [CC]      ED Course User Index  [CC] Bronson Paulson MD                  Antibiotics (72h ago, onward)      Start     Stop Route Frequency Ordered    06/25/24 1300  doxycycline tablet 100 mg         -- Oral Every 12 hours 06/25/24 1147          Latest lactate reviewed-  Recent Labs   Lab 06/24/24  2333 06/25/24  0327   LACTATE  --  1.2   POCLAC 0.50  --      Organ dysfunction indicated by  NA    Fluid challenge Not needed - patient is not hypotensive      Post- resuscitation assessment No - Post resuscitation assessment not needed       Will Not start Pressors- Levophed for MAP of 65  Source control achieved by: ABBronson MONROY  , personally performed the services described in this documentation. All medical record entries made by the scribe were at my direction and in my presence. I have reviewed the chart and agree that the record reflects my personal performance and is accurate and complete.      Clinical Impression:  Final diagnoses:  [A41.9] Sepsis  [J18.9, A41.9] Sepsis due to pneumonia          ED Disposition Condition    Admit                 Bronson Paulson MD  06/26/24 0058

## 2024-06-25 NOTE — ASSESSMENT & PLAN NOTE
This patient does have evidence of infective focus  My overall impression is sepsis.  Source: Respiratory  Antibiotics given-   Antibiotics (72h ago, onward)      Start     Stop Route Frequency Ordered    06/24/24 2315  piperacillin-tazobactam (ZOSYN) 4.5 g in D5W 100 mL IVPB (MB+)  (ED Adult Sepsis Treatment)         06/25/24 2314 IV Every 8 hours (non-standard times) 06/24/24 2307          Latest lactate reviewed-  Recent Labs   Lab 06/24/24  2333 06/25/24  0327   LACTATE  --  1.2   POCLAC 0.50  --      No end-organ damage noted and no fluid challenge required due to lactic acid being normal and blood pressure systolic >90/MAP>65.  Source control achieved by:  Continue IV antibiotics for now.  Order sputum culture and follow up on blood cultures.

## 2024-06-25 NOTE — PLAN OF CARE
Problem: Physical Therapy  Goal: Physical Therapy Goal  Description: Goals to be met by: 24     Patient will increase functional independence with mobility by performin. Pt to be SBA with bed mobility.  2. Pt to transfer with CGA.  3. Pt to ambulate 50' w/RW CGA.  4. Pt to be (I) with HEP.    Outcome: Progressing   Initial eval completed, see in chart for details.

## 2024-06-26 ENCOUNTER — PATIENT OUTREACH (OUTPATIENT)
Dept: ADMINISTRATIVE | Facility: HOSPITAL | Age: 84
End: 2024-06-26
Payer: MEDICARE

## 2024-06-26 LAB
ANION GAP SERPL CALC-SCNC: 12 MMOL/L (ref 8–16)
BASOPHILS # BLD AUTO: 0.03 K/UL (ref 0–0.2)
BASOPHILS NFR BLD: 0.4 % (ref 0–1.9)
BUN SERPL-MCNC: 25 MG/DL (ref 8–23)
CALCIUM SERPL-MCNC: 8.7 MG/DL (ref 8.7–10.5)
CHLORIDE SERPL-SCNC: 97 MMOL/L (ref 95–110)
CO2 SERPL-SCNC: 22 MMOL/L (ref 23–29)
CREAT SERPL-MCNC: 1.6 MG/DL (ref 0.5–1.4)
DIFFERENTIAL METHOD BLD: ABNORMAL
EOSINOPHIL # BLD AUTO: 0.2 K/UL (ref 0–0.5)
EOSINOPHIL NFR BLD: 2.7 % (ref 0–8)
ERYTHROCYTE [DISTWIDTH] IN BLOOD BY AUTOMATED COUNT: 14.6 % (ref 11.5–14.5)
EST. GFR  (NO RACE VARIABLE): 43 ML/MIN/1.73 M^2
GLUCOSE SERPL-MCNC: 198 MG/DL (ref 70–110)
HCT VFR BLD AUTO: 31.2 % (ref 40–54)
HGB BLD-MCNC: 10 G/DL (ref 14–18)
IMM GRANULOCYTES # BLD AUTO: 0.03 K/UL (ref 0–0.04)
IMM GRANULOCYTES NFR BLD AUTO: 0.4 % (ref 0–0.5)
INR PPP: 2.3 (ref 0.8–1.2)
LYMPHOCYTES # BLD AUTO: 0.7 K/UL (ref 1–4.8)
LYMPHOCYTES NFR BLD: 9.6 % (ref 18–48)
MCH RBC QN AUTO: 28.7 PG (ref 27–31)
MCHC RBC AUTO-ENTMCNC: 32.1 G/DL (ref 32–36)
MCV RBC AUTO: 90 FL (ref 82–98)
MONOCYTES # BLD AUTO: 1.3 K/UL (ref 0.3–1)
MONOCYTES NFR BLD: 17.7 % (ref 4–15)
NEUTROPHILS # BLD AUTO: 4.9 K/UL (ref 1.8–7.7)
NEUTROPHILS NFR BLD: 69.2 % (ref 38–73)
NRBC BLD-RTO: 0 /100 WBC
PLATELET # BLD AUTO: 180 K/UL (ref 150–450)
PMV BLD AUTO: 10.2 FL (ref 9.2–12.9)
POCT GLUCOSE: 182 MG/DL (ref 70–110)
POTASSIUM SERPL-SCNC: 4.4 MMOL/L (ref 3.5–5.1)
PROTHROMBIN TIME: 24.1 SEC (ref 9–12.5)
RBC # BLD AUTO: 3.48 M/UL (ref 4.6–6.2)
SODIUM SERPL-SCNC: 131 MMOL/L (ref 136–145)
WBC # BLD AUTO: 7.08 K/UL (ref 3.9–12.7)

## 2024-06-26 PROCEDURE — 25000003 PHARM REV CODE 250: Performed by: INTERNAL MEDICINE

## 2024-06-26 PROCEDURE — 25000242 PHARM REV CODE 250 ALT 637 W/ HCPCS: Performed by: INTERNAL MEDICINE

## 2024-06-26 PROCEDURE — 97116 GAIT TRAINING THERAPY: CPT | Mod: CQ

## 2024-06-26 PROCEDURE — 97535 SELF CARE MNGMENT TRAINING: CPT

## 2024-06-26 PROCEDURE — 97530 THERAPEUTIC ACTIVITIES: CPT | Mod: CQ

## 2024-06-26 PROCEDURE — 92526 ORAL FUNCTION THERAPY: CPT

## 2024-06-26 PROCEDURE — 99233 SBSQ HOSP IP/OBS HIGH 50: CPT | Mod: ,,, | Performed by: INTERNAL MEDICINE

## 2024-06-26 PROCEDURE — 36415 COLL VENOUS BLD VENIPUNCTURE: CPT | Performed by: HOSPITALIST

## 2024-06-26 PROCEDURE — 94761 N-INVAS EAR/PLS OXIMETRY MLT: CPT

## 2024-06-26 PROCEDURE — 85025 COMPLETE CBC W/AUTO DIFF WBC: CPT | Performed by: HOSPITALIST

## 2024-06-26 PROCEDURE — 99900035 HC TECH TIME PER 15 MIN (STAT)

## 2024-06-26 PROCEDURE — 27000221 HC OXYGEN, UP TO 24 HOURS

## 2024-06-26 PROCEDURE — 25000003 PHARM REV CODE 250: Performed by: HOSPITALIST

## 2024-06-26 PROCEDURE — 85610 PROTHROMBIN TIME: CPT | Performed by: HOSPITALIST

## 2024-06-26 PROCEDURE — 94660 CPAP INITIATION&MGMT: CPT

## 2024-06-26 PROCEDURE — 21400001 HC TELEMETRY ROOM

## 2024-06-26 PROCEDURE — 94640 AIRWAY INHALATION TREATMENT: CPT

## 2024-06-26 PROCEDURE — 80048 BASIC METABOLIC PNL TOTAL CA: CPT | Performed by: HOSPITALIST

## 2024-06-26 RX ORDER — ALBUTEROL SULFATE 0.83 MG/ML
SOLUTION RESPIRATORY (INHALATION)
Status: ON HOLD | COMMUNITY
Start: 2024-06-21

## 2024-06-26 RX ORDER — RESPIRATORY SYNCYTIAL VISUS VACCINE RECOMBINANT, ADJUVANTED 120MCG/0.5
KIT INTRAMUSCULAR
Status: ON HOLD | COMMUNITY
Start: 2024-04-03

## 2024-06-26 RX ORDER — PANTOPRAZOLE SODIUM 40 MG/1
40 FOR SUSPENSION ORAL DAILY
Status: DISCONTINUED | OUTPATIENT
Start: 2024-06-26 | End: 2024-06-30 | Stop reason: HOSPADM

## 2024-06-26 RX ADMIN — GABAPENTIN 900 MG: 300 CAPSULE ORAL at 09:06

## 2024-06-26 RX ADMIN — FLUTICASONE PROPIONATE 50 MCG: 50 SPRAY, METERED NASAL at 08:06

## 2024-06-26 RX ADMIN — IPRATROPIUM BROMIDE AND ALBUTEROL SULFATE 3 ML: 2.5; .5 SOLUTION RESPIRATORY (INHALATION) at 02:06

## 2024-06-26 RX ADMIN — WARFARIN SODIUM 7.5 MG: 5 TABLET ORAL at 04:06

## 2024-06-26 RX ADMIN — IPRATROPIUM BROMIDE AND ALBUTEROL SULFATE 3 ML: 2.5; .5 SOLUTION RESPIRATORY (INHALATION) at 08:06

## 2024-06-26 RX ADMIN — GABAPENTIN 900 MG: 300 CAPSULE ORAL at 08:06

## 2024-06-26 RX ADMIN — FLUTICASONE PROPIONATE 50 MCG: 50 SPRAY, METERED NASAL at 09:06

## 2024-06-26 RX ADMIN — DOXYCYCLINE HYCLATE 100 MG: 100 TABLET, COATED ORAL at 08:06

## 2024-06-26 RX ADMIN — INSULIN ASPART 5 UNITS: 100 INJECTION, SUSPENSION SUBCUTANEOUS at 08:06

## 2024-06-26 RX ADMIN — DULOXETINE HYDROCHLORIDE 60 MG: 30 CAPSULE, DELAYED RELEASE ORAL at 08:06

## 2024-06-26 RX ADMIN — PANTOPRAZOLE SODIUM 40 MG: 40 GRANULE, DELAYED RELEASE ORAL at 04:06

## 2024-06-26 RX ADMIN — FUROSEMIDE 40 MG: 40 TABLET ORAL at 08:06

## 2024-06-26 RX ADMIN — ATORVASTATIN CALCIUM 40 MG: 40 TABLET, FILM COATED ORAL at 09:06

## 2024-06-26 RX ADMIN — INSULIN ASPART 5 UNITS: 100 INJECTION, SUSPENSION SUBCUTANEOUS at 04:06

## 2024-06-26 RX ADMIN — DOXYCYCLINE HYCLATE 100 MG: 100 TABLET, COATED ORAL at 09:06

## 2024-06-26 NOTE — PT/OT/SLP PROGRESS
"Occupational Therapy   Treatment    Name: Percy Ramirez  MRN: [id number]  Admitting Diagnosis:  <principal problem not specified>       Recommendations:     Discharge Recommendations: Moderate Intensity Therapy  Discharge Equipment Recommendations:  to be determined by next level of care  Barriers to discharge:  Other (Comment) (pt. is having difficulty walking long distances with increased SOB and fatigue, not at PLOF)    Assessment:     Percy Ramirez is a 83 y.o. male with a medical diagnosis of <principal problem not specified>.  He presents up in chair when occupational therapy entered room. Performance deficits affecting function are weakness, impaired endurance, impaired self care skills, impaired functional mobility, gait instability, impaired balance, decreased lower extremity function, decreased safety awareness, pain, impaired cardiopulmonary response to activity, edema, impaired skin. He said that he was trying to use urinal, but he still had an accident and kalwick was sitting to side.     Rehab Prognosis:  Good; patient would benefit from acute skilled OT services to address these deficits and reach maximum level of function.       Plan:     Patient to be seen 5 x/week to address the above listed problems via self-care/home management, therapeutic activities, therapeutic exercises  Plan of Care Expires: 07/09/24  Plan of Care Reviewed with: patient    Subjective     Chief Complaint: "I take a while to use the bathroom."   Patient/Family Comments/goals: he wants to go a facility, but not inside a nursing home   Pain/Comfort:  Pain Rating 1: 0/10    Objective:     Communicated with: Beryl GARCIA, prior to session.  Patient found up in chair with oxygen, peripheral IV, telemetry upon OT entry to room.    General Precautions: Standard, fall    Orthopedic Precautions:N/A  Braces: N/A  Respiratory Status: Nasal cannula, flow 4  L/min     Occupational Performance:     Bed Mobility:    Patient completed " Rolling/Turning to Left with  minimum assistance  Patient completed Supine to Sit with minimum assistance     Functional Mobility/Transfers:  Patient completed Sit <> Stand Transfer with minimum assistance  with  rolling walker   Patient completed Bed <> Chair Transfer using Step Transfer technique with minimum assistance with rolling walker  Patient completed Toilet Transfer Step Transfer technique with minimum assistance with  rolling walker  Functional Mobility: Patient walked from chair to BSC next to bed ~5' with RW with CGA and then after toileting, he walked from BSC to bed another 5' with RW with CGA.     Activities of Daily Living:  Upper Body Dressing: minimum assistance to change gown twice due to urination in chair with urinal with accident and then with accident on BSC   Lower Body Dressing: total assistance to change briefs   Toileting: total assistance to change briefs, but setup with hygiene       [facility] 6 Click ADL: 17    Treatment & Education:  Occupational therapy educated patient re: use of BSC next to bed; he will need more instruction of how to use bucket for urination     Patient left HOB elevated with all lines intact and call button in reach and RN notified     GOALS:   Multidisciplinary Problems       Occupational Therapy Goals          Problem: Occupational Therapy    Goal Priority Disciplines Outcome Interventions   Occupational Therapy Goal     OT, PT/OT Progressing    Description: Goals to be met by: 07/09/24     Patient will increase functional independence with ADLs by performing:    UE Dressing with [place].  LE Dressing with Modified [place].  Grooming while standing at sink with Supervision.  Toileting from toilet with Supervision for hygiene and clothing management.   Sitting in chair x60  minutes with Supervision.  Toilet transfer to toilet with Supervision.  Upper extremity exercise program x10  reps per handout, with supervision.                         Time Tracking:      OT Date of Treatment: 06/26/24  OT Start Time: 1513  OT Stop Time: 1615  OT Total Time (min): 62 min    Billable Minutes:Self Care/Home Management 62     OT/MARIO: OT          6/26/2024

## 2024-06-26 NOTE — NURSING
[facility]  Nurses Note -- 4 Eyes      6/25/2024       Skin assessed on: Q Shift      [x] No Pressure Injuries Present    [x]Prevention Measures Documented    [] Yes LDA  for Pressure Injury Previously documented     [] Yes New Pressure Injury Discovered   [] LDA for New Pressure Injury Added      Attending RN:  Roselia Rivas RN     Second RN:  DOC Haider

## 2024-06-26 NOTE — PROGRESS NOTES
[facility] Medicine  Progress Note    Patient Name: Percy Ramirez  MRN: [id number]  Patient Class: IP- Inpatient   Admission Date: 6/24/2024  Length of Stay: 1 days  Attending Physician: Marcela Mayers, *  Primary Care Provider: Kasie Edmondson MD        Subjective:     Principal Problem:<principal problem not specified>        HPI:  83-year-old  male with past medical history significant for non-insulin-dependent type 2 diabetes, essential hypertension, COPD (on 4L  home oxygen)/ILD, h/o CVA,  permanent atrial fibrillation (on Coumadin 10 mg every day except for 7.5 mg on Monday Wednesday Friday), CAD (s/p 2V CABG 2024 and follow with Dr. Melissa/[facility]) presents to the ER with fevers and reports a generalized weakness and chills.  States increased cough but always has a cough.  Denies any productive sputum.  States he was short of breath and has dyspnea on exertion worsened normal.  With complaining of abdominal pain but no reports of nausea vomiting or diarrhea.    Workup in the ER for mildly elevated white blood count of 10.  Lactic acid was normal.  Liver enzymes were normal.  Pro count was normal.  His INR was 2.  Urine was not consistent with urinary tract infection.  COVID/FLU negative.     Chest x-ray:  Median sternotomy changes are present.  AP portable chest radiograph demonstrates a cardiac silhouette within normal limits.  Vascular calcifications are seen at the aortic knob.  There is nonspecific prominence of the interstitium.  There is no pneumothorax.  There is no focal consolidation, pneumothorax, or pleural effusion.     CT of the abdomen  1.. No evidence of hydronephrosis/obstructive uropathy bilaterally.  Nonobstructing right nephrolithiasis.  2. Right lower lobe consolidative change superimposed upon a background of diffuse emphysematous change.  Correlation for infectious process advised.  3. Cholelithiasis noting mild distention of the  gallbladder.  Further assessment as warranted.  4. Left inguinal hernia containing fat and a portion of the urinary bladder.  5. 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    Abdominal ultrasound:   Cholelithiasis and distended gallbladder.  No definite secondary sonographic evidence to suggest acute cholecystitis at this time noting there is no gallbladder wall thickening or hyperemia and reported negative sonographic Cervantes sign although the patient received pain medication prior to study which can limit assessment.  Clinical correlation and further assessment as warranted.      Due to  His port score is 121 he was started  IV abx Vanc and Zosyn to cover for possible Right lower lobe PNA.      Because this patient's clinical condition is guarded due to his sepsis and requires IVF abx and close monitoring of his respiratory status, care in an alternative location isn't clinically appropriate for the reasons stated above.     We have been consulted for further management and inpatient telemetry admission to hospitalist Medicine Service.           Overview/Hospital Course:  Patient with past medical history significant for non-insulin-dependent type 2 diabetes, essential hypertension, COPD (on 4L home oxygen)/ILD, h/o CVA, permanent atrial fibrillation (on Coumadin 10 mg every day except for 7.5 mg on Monday Wednesday Friday), CAD (s/p 2V CABG 2024 and follow with Dr. Melissa/[facility]) presents to the ER with fevers and reports a generalized weakness and chill ,patient is on IV Abx for URI.consulted ST for possible aspiration,on soft diet.  Consulted PT,OT,recommending SNF,but family wasn't go home with HH.  Fever  is resolved,blood culture no growth.    Interval History: Fever  is resolved,blood culture no growth.    Review of  Systems   Constitutional:  Positive for activity change and appetite change.   Respiratory:  Negative for apnea and chest tightness.    Gastrointestinal:  Negative for abdominal distention and abdominal pain.   Genitourinary:  Negative for difficulty urinating.   Musculoskeletal:  Negative for arthralgias.   Neurological:  Positive for weakness.     Objective:     Vital Signs (Most Recent):  Temp: 98.3 °F (36.8 °C) (06/26/24 1150)  Pulse: 94 (06/26/24 1150)  Resp: 18 (06/26/24 1150)  BP: 120/65 (06/26/24 1150)  SpO2: (!) 92 % (06/26/24 1150) Vital Signs (24h Range):  Temp:  [98 °F (36.7 °C)-98.9 °F (37.2 °C)] 98.3 °F (36.8 °C)  Pulse:  [80-98] 94  Resp:  [16-19] 18  SpO2:  [92 %-95 %] 92 %  BP: (112-136)/(58-77) 120/65     Weight: 93 kg (205 lb)  Body mass index is 31.17 kg/m².    Intake/Output Summary (Last 24 hours) at 6/26/2024 1158  Last data filed at 6/26/2024 0835  Gross per 24 hour   Intake 920 ml   Output 1100 ml   Net -180 ml         Physical Exam  Cardiovascular:      Rate and Rhythm: Normal rate.   Pulmonary:      Effort: No respiratory distress.      Breath sounds: Rales present.   Abdominal:      General: There is no distension.   Musculoskeletal:         General: No swelling.   Neurological:      Mental Status: He is alert and oriented to person, place, and time.             Significant Labs: All pertinent labs within the past 24 hours have been reviewed.  BMP:   Recent Labs   Lab 06/26/24  0445   *   *   K 4.4   CL 97   CO2 22*   BUN 25*   CREATININE 1.6*   CALCIUM 8.7     CBC:   Recent Labs   Lab 06/24/24 2311 06/26/24  0445   WBC 10.04 7.08   HGB 11.4* 10.0*   HCT 34.9* 31.2*    180     CMP:   Recent Labs   Lab 06/24/24 2311 06/26/24  0445   * 131*   K 5.8* 4.4   CL 99 97   CO2 23 22*   * 198*   BUN 32* 25*   CREATININE 1.9* 1.6*   CALCIUM 8.8 8.7   PROT 7.1  --    ALBUMIN 3.2*  --    BILITOT 0.6  --    ALKPHOS 85  --    AST 13  --    ALT 9*  --    ANIONGAP 11 12        Significant Imaging: I have reviewed all pertinent imaging results/findings within the past 24 hours.    Assessment/Plan:      Sepsis without acute organ dysfunction  This patient does have evidence of infective focus  My overall impression is sepsis.  Source: Respiratory  Antibiotics given-   Antibiotics (72h ago, onward)      Start     Stop Route Frequency Ordered    06/24/24 2315  piperacillin-tazobactam (ZOSYN) 4.5 g in D5W 100 mL IVPB (MB+)  (ED Adult Sepsis Treatment)         06/25/24 2314 IV Every 8 hours (non-standard times) 06/24/24 2307          Latest lactate reviewed-  Recent Labs   Lab 06/24/24  2333 06/25/24  0327   LACTATE  --  1.2   POCLAC 0.50  --      No end-organ damage noted and no fluid challenge required due to lactic acid being normal and blood pressure systolic >90/MAP>65.  Source control achieved by:  Continue IV antibiotics for now.  Order sputum culture and follow up on blood cultures.        Pneumonia of right lower lobe due to infectious organism  Continue antibiotics for now wean oxygen to his home levels of 4 L.  Follow up on sputum and blood cultures.  Fever  is resolved,blood culture no growth.    Chronic kidney disease, stage 3a  Creatinine baseline closer to 1.5-1.6.  Currently mildly elevated at 1.9.  Continue to monitor for now especially monitor for bouts of hypotension given sepsis.  Blood pressure medications are resumed but with hold parameters for low normal blood pressure.  Follow up on repeat labs.    ILD (interstitial lung disease)  On 4 liter home oxygen.      Persistent atrial fibrillation  Patient with Persistent (7 days or more) atrial fibrillation which is controlled currently with Beta Blocker. Patient is currently in sinus rhythm.[id number] Score: 4. Anticoagulation indicated. Anticoagulation done with Coumadin.  INR in appropriate range.  Continue home dose of Coumadin and monitor closely given use of antibiotics at this time .    COPD with acute  exacerbation  Continue home oxygen.  Scheduled and p.r.n. DuKarin ordered.  We will hold off on IV steroids at this time given uncontrolled diabetes as well as sepsis/pneumonic process. Treat pneumonia.    Type 2 diabetes, with peripheral circulatory disorder not at goal  Patient's FSGs are controlled on current medication regimen.  Last A1c reviewed-   Lab Results   Component Value Date    LABA1C 6.8 10/09/2017    HGBA1C 9.8 (H) 03/18/2024     Most recent fingerstick glucose reviewed-   Recent Labs   Lab 06/26/24  0724   POCTGLUCOSE 182*     Current correctional scale  Medium  Maintain anti-hyperglycemic dose as follows-   Antihyperglycemics (From admission, onward)      Start     Stop Route Frequency Ordered    06/25/24 0715  insulin aspart protamine-insulin aspart (NovoLOG 70/30) injection         -- SubQ 2 times daily with meals 06/25/24 0250          Hold Oral hypoglycemics while patient is in the hospital.

## 2024-06-26 NOTE — PROGRESS NOTES
[facility]  Palliative Medicine  Progress Note    Patient Name: Percy Ramirez  MRN: [id number]  Admission Date: 6/24/2024  Hospital Length of Stay: 1 days  Code Status: Full Code   Attending Provider: Marcela Mayers, *  Consulting Provider: Marci Dominguez MD  Primary Care Physician: Kasie Edmondson MD  Principal Problem:<principal problem not specified>    Assessment/Plan:     Advance Care Planning    6/26/24  - Chart and interval history reviewed; had also discussed pt via SecureChat with SLP after visit - pt is clear for diet, though will likely need outpatient GI follow up.   - During visit to bedside, pt was just starting his PT/OT session. He said he does not paritcularly enjoy working with PT/OT, though I reminded him that if his goal is to stay as mobile as possible, this is absolutely necessary. His preference is go home, rather than look into any sort of SNF. \  - Given pt's preference to return home, suggest addition of home-based palliative care for support. Should goals eventually become comfort-focused, palliative team can assist with hospice transition.   - Will follow up with pt during this and any future hospital stays; updated SW/CM in person after visit     6/25/24  - Consult for support and advance care planning in older pt with underlying chronic respiratory failure in hospital with weakness; suspected to have pneumonia. He is currently being treated with pneumonia, though SLP has been consulted due to concern for dysphagia. Chart reviewed in depth prior to visit.   - Met with pt at bedside; during entirety of visit, he was alert and readily able to participate in discussion. Part of the way through visit we were joined by his very supportive daughter, Kalyani, who is pt's preferred MPOA in the event he is unable to make his own medical decisions.   - Introduced role of palliative medicine in pt's care, and learned more about him outside of hospital. He is a retired tug boat  captain, and only retired due to vision loss and poorly controlled diabetes - it does not sound like he was ready to give up work, sadly. He has always been active and independent, and tries to do as many of his ADLs around house as possible despite vision loss and lung disease. He lives with one of his three children (son Kenton).   - Medical update provided; discussed that currently we are treating a pneumonia. While his underlying lung disease and diabetes are risk factors for this, we also are aware of possibility of aspiration contributing. Per pt, he does have issues with swallowing at times.   - Discussed plan moving forward, as well as code status. As pt currently feels he has a good quality of life and has been doing fine up until now, he would like to continue with maximal medical therapy, including time-limited trial of life support if needed. I did share transparent concern that if pt were to end up on ventilator, his expected outcome would likely be poor due to his underlying health issues. Daughter verbalized understanding of this, though is supportive of pt's wishes.   - Let them know my team will continue to check in on them throughout hospital stay, and also that I would reach out to SLP to discuss his care.   - Updated SLP after visit; will follow up with pt and family     Ophtho  Macular degeneration  - Pt with vision loss secondary to this    Pulmonary  Pneumonia of right lower lobe due to infectious organism  - Abx/mgmt per primary team     ILD (interstitial lung disease)  - Noted; on home 4L NC oxygen     COPD with acute exacerbation  - Mgmt per primary team; at baseline is on home oxygen 4L NC continuous though is still ambulatory and able to complete some ADLs despite this and blindness      Renal/  Chronic kidney disease, stage 3a  - Noted underlying disease    ID  Sepsis without acute organ dysfunction  - Abx and mgmt per primary team; possible PNA     GI  Dysphagia  - SLP consulted;  recommended outpatient GI follow up     I will follow-up with patient. Please contact us if you have any additional questions.    CHRISTIANO De Jesus  Palliative Medicine Staff   [phone]    > 50% of 35 min visit spent in chart review, face to face discussion of symptom assessment, coordination of care with other specialists, goals of care, emotional support, formulating and communicating prognosis, exploring burden/ benefit of various approaches of treatment, and discharge planning      Subjective:     Interval History: was up with PT/OT during visit this morning     Medications:  Continuous Infusions:  Scheduled Meds:   albuterol-ipratropium  3 mL Nebulization Q6H WAKE    atorvastatin  40 mg Oral QHS    doxycycline  100 mg Oral Q12H    DULoxetine  60 mg Oral Daily    fluticasone propionate  1 spray Each Nostril BID    furosemide  40 mg Oral Daily    gabapentin  900 mg Oral BID    insulin aspart protamine-insulin aspart  5 Units Subcutaneous BIDWM    warfarin  10 mg Oral Every Tues, Thurs, Sat, Sun    warfarin  7.5 mg Oral Every Mon, Wed, Fri     PRN Meds:  Current Facility-Administered Medications:     albuterol-ipratropium, 3 mL, Nebulization, Q4H PRN    guaiFENesin 100 mg/5 ml, 200 mg, Oral, Q4H PRN    HYDROcodone-acetaminophen, 1 tablet, Oral, Q8H PRN    nitroGLYCERIN, 0.4 mg, Sublingual, Q5 Min PRN    Objective:     Vital Signs (Most Recent):  Temp: 98.3 °F (36.8 °C) (06/26/24 1150)  Pulse: 94 (06/26/24 1150)  Resp: 18 (06/26/24 1150)  BP: 120/65 (06/26/24 1150)  SpO2: (!) 92 % (06/26/24 1150) Vital Signs (24h Range):  Temp:  [98 °F (36.7 °C)-98.9 °F (37.2 °C)] 98.3 °F (36.8 °C)  Pulse:  [80-98] 94  Resp:  [16-19] 18  SpO2:  [92 %-95 %] 92 %  BP: (112-136)/(58-77) 120/65     Weight: 93 kg (205 lb)  Body mass index is 31.17 kg/m².       Physical Exam  Constitutional:       Appearance: He is obese.      Comments: Chronically ill-appearing, in no distress   Eyes:      Comments: Vision impairment         Significant Labs: All pertinent labs within the past 24 hours have been reviewed.  CBC:   Recent Labs   Lab 06/26/24 0445   WBC 7.08   HGB 10.0*   HCT 31.2*   MCV 90        BMP:  Recent Labs   Lab 06/26/24 0445   *   *   K 4.4   CL 97   CO2 22*   BUN 25*   CREATININE 1.6*   CALCIUM 8.7     LFT:  Lab Results   Component Value Date    AST 13 06/24/2024    ALKPHOS 85 06/24/2024    BILITOT 0.6 06/24/2024     Albumin:   Albumin   Date Value Ref Range Status   06/24/2024 3.2 (L) 3.5 - 5.2 g/dL Final   01/23/2018 3.9 3.6 - 5.1 Final     Comment:     Dr. Philippe Aquino(Endocrinology)     Protein:   Total Protein   Date Value Ref Range Status   06/24/2024 7.1 6.0 - 8.4 g/dL Final     Lactic acid:   Lab Results   Component Value Date    LACTATE 1.2 06/25/2024       Significant Imaging: I have reviewed all pertinent imaging results/findings within the past 24 hours.

## 2024-06-26 NOTE — PLAN OF CARE
"Case Management Assessment     PCP: Kasie Edmondson MD    Pharmacy:   [id number]  [address] [address] [facility]  [address]  [place] [address]  Phone: [phone] Fax: [phone]      Patient Arrived From: Home  Existing Help at Home: Patient lives with his adult son; Kalyani Felder (Daughter) [phone]       Barriers to Discharge: None    Discharge Plan:    A. Home Health    B. Home with family  Spoke with patient at bedside. He stated he is still somewhat self sufficient; he also lives with his adult son who is able to assist at home as needed.  Pt also has an adult daughter who is available to help at home.  Pt stated he currently uses a straight cane at home but may be need a rolling walker soon.  Followed up with patient regarding next steps of care and thoughts on hospice and palliative care.  Patient stated he is completley against hospice because "he's not dead yet" and did not discuss it further. Pt stated he is open to home health and palliative care to be followed by a nurse practitioner. Per patient, he is currently taking Coumadin and has a machine at home to monitor himself; he also reported his grandson checks his labs/blood and sends off the results.  Pt did not have a preference for home health or palliative care.  I provided the patient a choice of post acute providers and offered a list of CMS rated home health agencies and palliative care agencies. Patient has declined to select a preferred provider and elects placement with the first accepting in network provider that is available to provide services as ordered by the physician.   CM to continue to assess for and until discharge.    06/26/24 1247   Discharge Assessment   Assessment Type Discharge Planning Assessment   Confirmed/corrected address, phone number and insurance Yes   Confirmed Demographics Correct on Facesheet   Source of Information patient   Does patient/caregiver understand observation status " Yes   Communicated TATI with patient/caregiver Date not available/Unable to determine   People in Home child(bridget), adult   Facility Arrived From: Home   Do you expect to return to your current living situation? Yes   Do you have help at home or someone to help you manage your care at home? Yes   Who are your caregiver(s) and their phone number(s)? Kalyani Felder (Daughter)  [phone]   Prior to hospitilization cognitive status: Unable to Assess   Current cognitive status: Alert/Oriented   Equipment Currently Used at Home oxygen;BIPAP;CPAP;glucometer;cane, straight   Readmission within 30 days? No   Patient currently being followed by outpatient case management? No   Do you currently have service(s) that help you manage your care at home? No   Do you take prescription medications? Yes   Do you have prescription coverage? Yes   Coverage [employer] [facility] [employer]   Do you have any problems affording any of your prescribed medications? Yes   If yes, what medications? Inhaler   Is the patient taking medications as prescribed? yes   Who is going to help you get home at discharge? Kalyani Felder (Daughter) [phone]   How do you get to doctors appointments? family or friend will provide   Are you on dialysis? No   Do you take coumadin? Yes   Who monitors your labs? Patient's grandson   Discharge Plan A Home with family;NP at home   Discharge Plan B Home with family   DME Needed Upon Discharge  other (see comments)  (TBD)   Discharge Plan discussed with: Patient   Transition of Care Barriers None         2:25pm: CM sent referrals for home health and palliative care, via [employer]; awaiting responses.

## 2024-06-26 NOTE — SUBJECTIVE & OBJECTIVE
Interval History: was up with PT/OT during visit this morning     Medications:  Continuous Infusions:  Scheduled Meds:   albuterol-ipratropium  3 mL Nebulization Q6H WAKE    atorvastatin  40 mg Oral QHS    doxycycline  100 mg Oral Q12H    DULoxetine  60 mg Oral Daily    fluticasone propionate  1 spray Each Nostril BID    furosemide  40 mg Oral Daily    gabapentin  900 mg Oral BID    insulin aspart protamine-insulin aspart  5 Units Subcutaneous BIDWM    warfarin  10 mg Oral Every Tues, Thurs, Sat, Sun    warfarin  7.5 mg Oral Every Mon, Wed, Fri     PRN Meds:  Current Facility-Administered Medications:     albuterol-ipratropium, 3 mL, Nebulization, Q4H PRN    guaiFENesin 100 mg/5 ml, 200 mg, Oral, Q4H PRN    HYDROcodone-acetaminophen, 1 tablet, Oral, Q8H PRN    nitroGLYCERIN, 0.4 mg, Sublingual, Q5 Min PRN    Objective:     Vital Signs (Most Recent):  Temp: 98.3 °F (36.8 °C) (06/26/24 1150)  Pulse: 94 (06/26/24 1150)  Resp: 18 (06/26/24 1150)  BP: 120/65 (06/26/24 1150)  SpO2: (!) 92 % (06/26/24 1150) Vital Signs (24h Range):  Temp:  [98 °F (36.7 °C)-98.9 °F (37.2 °C)] 98.3 °F (36.8 °C)  Pulse:  [80-98] 94  Resp:  [16-19] 18  SpO2:  [92 %-95 %] 92 %  BP: (112-136)/(58-77) 120/65     Weight: 93 kg (205 lb)  Body mass index is 31.17 kg/m².       Physical Exam  Constitutional:       Appearance: He is obese.      Comments: Chronically ill-appearing, in no distress   Eyes:      Comments: Vision impairment        Significant Labs: All pertinent labs within the past 24 hours have been reviewed.  CBC:   Recent Labs   Lab 06/26/24  0445   WBC 7.08   HGB 10.0*   HCT 31.2*   MCV 90        BMP:  Recent Labs   Lab 06/26/24  0445   *   *   K 4.4   CL 97   CO2 22*   BUN 25*   CREATININE 1.6*   CALCIUM 8.7     LFT:  Lab Results   Component Value Date    AST 13 06/24/2024    ALKPHOS 85 06/24/2024    BILITOT 0.6 06/24/2024     Albumin:   Albumin   Date Value Ref Range Status   06/24/2024 3.2 (L) 3.5 - 5.2 g/dL  Final   01/23/2018 3.9 3.6 - 5.1 Final     Comment:     Dr. Philippe Aquino(Endocrinology)     Protein:   Total Protein   Date Value Ref Range Status   06/24/2024 7.1 6.0 - 8.4 g/dL Final     Lactic acid:   Lab Results   Component Value Date    LACTATE 1.2 06/25/2024       Significant Imaging: I have reviewed all pertinent imaging results/findings within the past 24 hours.

## 2024-06-26 NOTE — PLAN OF CARE
Problem: Physical Therapy  Goal: Physical Therapy Goal  Description: Goals to be met by: 24     Patient will increase functional independence with mobility by performin. Pt to be SBA with bed mobility.  2. Pt to transfer with CGA.  3. Pt to ambulate 50' w/RW CGA.  4. Pt to be (I) with HEP.    Outcome: Progressing

## 2024-06-26 NOTE — ASSESSMENT & PLAN NOTE
Patient's FSGs are controlled on current medication regimen.  Last A1c reviewed-   Lab Results   Component Value Date    LABA1C 6.8 10/09/2017    HGBA1C 9.8 (H) 03/18/2024     Most recent fingerstick glucose reviewed-   Recent Labs   Lab 06/26/24  0724   POCTGLUCOSE 182*     Current correctional scale  Medium  Maintain anti-hyperglycemic dose as follows-   Antihyperglycemics (From admission, onward)      Start     Stop Route Frequency Ordered    06/25/24 0715  insulin aspart protamine-insulin aspart (NovoLOG 70/30) injection         -- SubQ 2 times daily with meals 06/25/24 0250          Hold Oral hypoglycemics while patient is in the hospital.

## 2024-06-26 NOTE — ASSESSMENT & PLAN NOTE
Continue antibiotics for now wean oxygen to his home levels of 4 L.  Follow up on sputum and blood cultures.  Fever  is resolved,blood culture no growth.

## 2024-06-26 NOTE — PT/OT/SLP PROGRESS
Physical Therapy Treatment    Patient Name:  Percy Ramirez   MRN:  [id number]    Recommendations:     Discharge Recommendations: Moderate Intensity Therapy  Discharge Equipment Recommendations: none  Barriers to discharge: None    Assessment:     Percy Ramirez is a 83 y.o. male admitted with a medical diagnosis of <principal problem not specified>.  He presents with the following impairments/functional limitations: weakness, impaired endurance, impaired self care skills, impaired functional mobility, gait instability, impaired balance, decreased upper extremity function, decreased lower extremity function, decreased safety awareness, impaired cardiopulmonary response to activity, edema, impaired skin.    Rehab Prognosis: Good; patient would benefit from acute skilled PT services to address these deficits and reach maximum level of function.    Recent Surgery: * No surgery found *      Plan:     During this hospitalization, patient to be seen 5 x/week to address the identified rehab impairments via gait training, therapeutic activities, therapeutic exercises and progress toward the following goals:    Plan of Care Expires:  07/02/24    Subjective     Chief Complaint: pt wanted to use BSC  Patient/Family Comments/goals: pt agreed to participate.  Pain/Comfort:  Pain Rating 1: 0/10  Pain Rating Post-Intervention 1: 0/10      Objective:     Communicated with nurse [place] prior to session.  Patient found sitting edge of bed with oxygen, peripheral IV, telemetry upon PT entry to room.     General Precautions: Standard, fall  Orthopedic Precautions: N/A  Braces: N/A  Respiratory Status: Nasal cannula, flow 4 L/min SpO2 91%     Functional Mobility:  Transfers:   gait belt donned prior standing  Sit to Stand:  1 trial from EOB and 2 trials from BSC with minimum assistance with rolling walker  Bed to BSC: minimum assistance with  rolling walker using Step Transfer  BSC to BS Chair:minimum assistance with rolling walker using  Step Transfer  Gait: Pt ambulated ~4ft and ~7ft with Min A using RW on 5 L/min Oxygen NC. Pt with decreased keesha/step length, flexed posture, min unsteadiness but no LOB; v/t cueing for , AD management, upright posture, PLB tech; SpO2 88-91%  Balance: Fair/Fair + Sitting; Fair Standing      AM-PAC 6 CLICK MOBILITY  Turning over in bed (including adjusting bedclothes, sheets and blankets)?: 3  Sitting down on and standing up from a chair with arms (e.g., wheelchair, bedside commode, etc.): 3  Moving from lying on back to sitting on the side of the bed?: 3  Moving to and from a bed to a chair (including a wheelchair)?: 3  Need to walk in hospital room?: 3  Climbing 3-5 steps with a railing?: 2  Basic Mobility Total Score: 17       Treatment & Education:  Educated pt on PLB tech, benefits of OOB activity with hospital staff assistance and performing BLE ex throughout the day, pt verbalized understanding.    Pt had BM. Pt lola ~2 min in standing with CGA using RW for Total A danielle hygiene. Pt c/o feeling dizzy. PTA assisted pt sit back down on BSC to rest, pt reported feeling better. Then pt completed standing up and ambulating to BS Chair after. SpO2 88% and back up to 92% after resting, nurse notified.    Nurse notified to place a new Mepilex on pt's sacrum area post treatment.    Patient left up in chair on pillow with BLE offloaded by pillow, tray table near by, Oxygen, all lines intact, call button in reach, and nurse notified. Pt room door left open and close to nursing station.    GOALS:   Multidisciplinary Problems       Physical Therapy Goals          Problem: Physical Therapy    Goal Priority Disciplines Outcome Goal Variances Interventions   Physical Therapy Goal     PT, PT/OT Progressing     Description: Goals to be met by: 24     Patient will increase functional independence with mobility by performin. Pt to be SBA with bed mobility.  2. Pt to transfer with CGA.  3. Pt to ambulate 50'  w/RW CGA.  4. Pt to be (I) with HEP.                         Time Tracking:     PT Received On: 06/26/24  PT Start Time: 0945     PT Stop Time: 1017  PT Total Time (min): 32 min     Billable Minutes: Gait Training 14 min and Therapeutic Activity 18 min    Treatment Type: Treatment  PT/PTA: PTA     Number of PTA visits since last PT visit: 1 06/26/2024

## 2024-06-26 NOTE — CONSULTS
"1903:   Consult was conducted with the patient and his medical provider. Medical Provider reported that the patient's daughter visits with her father. She stated that earlier today the patient dreamed that he was being attacked by pirates and attempted to get out of his hospital bed.  My visit with the patient began with me asking about his well being? He responded, " Everyone wants to know how I am doing? I am doing everyone I can!" He continued by disclosing that his son lives in his house with him, but normally I take care of myself. Patient requested that I contact his past  at [facility] in [place]. A telephone call was made to [place], [place] at [phone] a voicemail was left for  Kyle Michelle.  Prayers were offered for the patient and his family.  The Spiritual Care Team will continue to provide spiritual support to the patient and his family.        ALIN Ramos   [phone]  "

## 2024-06-26 NOTE — PLAN OF CARE
Problem: Adult Inpatient Plan of Care  Goal: Plan of Care Review  Outcome: Progressing  Goal: Patient-Specific Goal (Individualized)  Outcome: Progressing  Goal: Absence of Hospital-Acquired Illness or Injury  Outcome: Progressing  Goal: Optimal Comfort and Wellbeing  Outcome: Progressing  Goal: Readiness for Transition of Care  Outcome: Progressing     Problem: Diabetes Comorbidity  Goal: Blood Glucose Level Within Targeted Range  Outcome: Progressing     Problem: Skin Injury Risk Increased  Goal: Skin Health and Integrity  Outcome: Progressing     Problem: Fall Injury Risk  Goal: Absence of Fall and Fall-Related Injury  Outcome: Progressing     Problem: Sepsis/Septic Shock  Goal: Optimal Coping  Outcome: Progressing  Goal: Absence of Bleeding  Outcome: Progressing  Goal: Blood Glucose Level Within Targeted Range  Outcome: Progressing  Goal: Absence of Infection Signs and Symptoms  Outcome: Progressing  Goal: Optimal Nutrition Intake  Outcome: Progressing     Problem: Pneumonia  Goal: Fluid Balance  Outcome: Progressing  Goal: Resolution of Infection Signs and Symptoms  Outcome: Progressing  Goal: Effective Oxygenation and Ventilation  Outcome: Progressing     Problem: Coping Ineffective  Goal: Effective Coping  Outcome: Progressing

## 2024-06-26 NOTE — PT/OT/SLP PROGRESS
"Speech Language Pathology Treatment    Patient Name:  Percy Ramirez   MRN:  [id number]  Admitting Diagnosis: fatigue     Recommendations:                 General Recommendations:  GI evaluation outpatient   Diet recommendations:  Soft & Bite Sized Diet - IDDSI Level 6, Liquid Diet Level: Thin   Aspiration Precautions: reflux precautions   General Precautions: Standard,    Communication strategies:  none    Assessment:     Percy Ramirez is a 83 y.o. male with dx of fatigue he present with functional oral and pharyngeal phases of swallow and is describing infrequent symptoms of LPR.     Subjective   Pt reporting extended coughing episode with intake of rice last night for dinner. Despite this incident he does not wish to stop eating rice. He reports he regularly has to use tums and will takes 4 at a time.   Patient goals: continue unrestricted diet.     Pain/Comfort:  Pain Rating 1: 0/10    Respiratory Status: Nasal cannula, flow 4 L/min    Objective:     Has the patient been evaluated by SLP for swallowing?   Yes  Keep patient NPO? No     Pt seen upright in bed for trials of thin liquids and solids. Intake across ~3oz and x1 cracker were unremarkable. ST recommendation of outpt GI for reported symptoms of possible of LPR- inconsistent globus sensation in throat for intake of solids, "tickle" in throat with carbonated beverages, and inconsistent difficulty with grainy items. ST contacted via secure chat regarding Pt's performance/reports, POC to include initiate of Protonix and outpatient GI consult. ST with nothing more to add.     Goals:   Multidisciplinary Problems       SLP Goals       Not on file              Multidisciplinary Problems (Resolved)          Problem: SLP    Goal Priority Disciplines Outcome   SLP Goal   (Resolved)    Low SLP Met   Description: Pt will tolerate IDDSI 6 with thin liquids x2 consecutive sessions (2 of 2 met 6/26)                       Plan:     Patient to be seen:  2 x/week   Plan of " Care expires:  06/26/24  Plan of Care reviewed with:  patient   SLP Follow-Up:  no     Discharge recommendations:  No Therapy Indicated   Barriers to Discharge:  None    Time Tracking:     SLP Treatment Date:   06/26/24  Speech Start Time:  1619  Speech Stop Time:  1629     Speech Total Time (min):  10 min    Billable Minutes: swallow treatment 10 minutes    06/26/2024

## 2024-06-26 NOTE — ASSESSMENT & PLAN NOTE
6/26/24  - Chart and interval history reviewed; had also discussed pt via SecureChat with SLP after visit - pt is clear for diet, though will likely need outpatient GI follow up.   - During visit to bedside, pt was just starting his PT/OT session. He said he does not paritcularly enjoy working with PT/OT, though I reminded him that if his goal is to stay as mobile as possible, this is absolutely necessary. His preference is go home, rather than look into any sort of SNF. \  - Given pt's preference to return home, suggest addition of home-based palliative care for support. Should goals eventually become comfort-focused, palliative team can assist with hospice transition.   - Will follow up with pt during this and any future hospital stays; updated SW/CM in person after visit     6/25/24  - Consult for support and advance care planning in older pt with underlying chronic respiratory failure in hospital with weakness; suspected to have pneumonia. He is currently being treated with pneumonia, though SLP has been consulted due to concern for dysphagia. Chart reviewed in depth prior to visit.   - Met with pt at bedside; during entirety of visit, he was alert and readily able to participate in discussion. Part of the way through visit we were joined by his very supportive daughter, Kalyani, who is pt's preferred MPOA in the event he is unable to make his own medical decisions.   - Introduced role of palliative medicine in pt's care, and learned more about him outside of hospital. He is a retired , and only retired due to vision loss and poorly controlled diabetes - it does not sound like he was ready to give up work, sadly. He has always been active and independent, and tries to do as many of his ADLs around house as possible despite vision loss and lung disease. He lives with one of his three children (son Kenton).   - Medical update provided; discussed that currently we are treating a pneumonia. While his  underlying lung disease and diabetes are risk factors for this, we also are aware of possibility of aspiration contributing. Per pt, he does have issues with swallowing at times.   - Discussed plan moving forward, as well as code status. As pt currently feels he has a good quality of life and has been doing fine up until now, he would like to continue with maximal medical therapy, including time-limited trial of life support if needed. I did share transparent concern that if pt were to end up on ventilator, his expected outcome would likely be poor due to his underlying health issues. Daughter verbalized understanding of this, though is supportive of pt's wishes.   - Let them know my team will continue to check in on them throughout hospital stay, and also that I would reach out to SLP to discuss his care.   - Updated SLP after visit; will follow up with pt and family

## 2024-06-26 NOTE — HOSPITAL COURSE
Patient with past medical history significant for non-insulin-dependent type 2 diabetes, essential hypertension, COPD (on 4L home oxygen)/ILD, h/o CVA, permanent atrial fibrillation (on Coumadin 10 mg every day except for 7.5 mg on Monday Wednesday Friday), CAD (s/p 2V CABG 2024 and follow with Dr. [facility]) presents to the ER with fevers and reports a generalized weakness and chill ,patient is on IV Abx for URI.consulted ST for possible aspiration,on soft diet.  Consulted PT,OT,recommending SNF,but family wasn't go home with HH.  Fever  is resolved,blood culture no growth.  Patient improved shortness of breath, cough, congestion for right lung pneumonia.  Patient was treated with IV Rocephin and doxycycline p.o..  Patient's blood cultures show 1/2 growing Gram-positive cocci coagulase-negative staph, likely contaminant.  Patient to be discharged home after echo done to assess LV function due to history of chronic CHF.  Patient will be discharged on cefdinir 300 mg p.o. b.i.d. for 7 days, doxycycline 100 mg p.o. b.i.d. for 7 days.  Patient's CHF medications GDM T doses were decreased due to concerns of low normal blood pressure.  Coreg decreased to 3.125 mg p.o. b.i.d., spironolactone 12.5 mg daily, valsartan 40 mg daily.  Follow up with PCP in 1 week.  Cardiology clinic in 2 weeks or as scheduled.  The patient if he develops chest pains, shortness for breath, nausea, vomiting, abdominal pain type symptoms he should go to the local ER.  Patient to be discharged home with home health care with palliative care.  Patient verbalized understanding.   assist with discharge planning, input appreciated.

## 2024-06-27 LAB
ANION GAP SERPL CALC-SCNC: 10 MMOL/L (ref 8–16)
BASOPHILS # BLD AUTO: 0.03 K/UL (ref 0–0.2)
BASOPHILS NFR BLD: 0.4 % (ref 0–1.9)
BUN SERPL-MCNC: 20 MG/DL (ref 8–23)
CALCIUM SERPL-MCNC: 8.7 MG/DL (ref 8.7–10.5)
CHLORIDE SERPL-SCNC: 95 MMOL/L (ref 95–110)
CO2 SERPL-SCNC: 25 MMOL/L (ref 23–29)
CREAT SERPL-MCNC: 1.3 MG/DL (ref 0.5–1.4)
DIFFERENTIAL METHOD BLD: ABNORMAL
EOSINOPHIL # BLD AUTO: 0.3 K/UL (ref 0–0.5)
EOSINOPHIL NFR BLD: 3.2 % (ref 0–8)
ERYTHROCYTE [DISTWIDTH] IN BLOOD BY AUTOMATED COUNT: 14.3 % (ref 11.5–14.5)
EST. GFR  (NO RACE VARIABLE): 55 ML/MIN/1.73 M^2
GLUCOSE SERPL-MCNC: 175 MG/DL (ref 70–110)
HCT VFR BLD AUTO: 30.2 % (ref 40–54)
HGB BLD-MCNC: 9.8 G/DL (ref 14–18)
IMM GRANULOCYTES # BLD AUTO: 0.05 K/UL (ref 0–0.04)
IMM GRANULOCYTES NFR BLD AUTO: 0.6 % (ref 0–0.5)
INR PPP: 2.5 (ref 0.8–1.2)
LYMPHOCYTES # BLD AUTO: 0.6 K/UL (ref 1–4.8)
LYMPHOCYTES NFR BLD: 7.9 % (ref 18–48)
MCH RBC QN AUTO: 28.4 PG (ref 27–31)
MCHC RBC AUTO-ENTMCNC: 32.5 G/DL (ref 32–36)
MCV RBC AUTO: 88 FL (ref 82–98)
MONOCYTES # BLD AUTO: 1.2 K/UL (ref 0.3–1)
MONOCYTES NFR BLD: 15.6 % (ref 4–15)
NEUTROPHILS # BLD AUTO: 5.6 K/UL (ref 1.8–7.7)
NEUTROPHILS NFR BLD: 72.3 % (ref 38–73)
NRBC BLD-RTO: 0 /100 WBC
PLATELET # BLD AUTO: 170 K/UL (ref 150–450)
PMV BLD AUTO: 9.6 FL (ref 9.2–12.9)
POCT GLUCOSE: 235 MG/DL (ref 70–110)
POCT GLUCOSE: 347 MG/DL (ref 70–110)
POTASSIUM SERPL-SCNC: 4.2 MMOL/L (ref 3.5–5.1)
PROTHROMBIN TIME: 26.3 SEC (ref 9–12.5)
RBC # BLD AUTO: 3.45 M/UL (ref 4.6–6.2)
SODIUM SERPL-SCNC: 130 MMOL/L (ref 136–145)
WBC # BLD AUTO: 7.76 K/UL (ref 3.9–12.7)

## 2024-06-27 PROCEDURE — 27000221 HC OXYGEN, UP TO 24 HOURS

## 2024-06-27 PROCEDURE — 94640 AIRWAY INHALATION TREATMENT: CPT

## 2024-06-27 PROCEDURE — 97535 SELF CARE MNGMENT TRAINING: CPT

## 2024-06-27 PROCEDURE — 36415 COLL VENOUS BLD VENIPUNCTURE: CPT | Performed by: HOSPITALIST

## 2024-06-27 PROCEDURE — 25000003 PHARM REV CODE 250: Performed by: INTERNAL MEDICINE

## 2024-06-27 PROCEDURE — 25000003 PHARM REV CODE 250: Performed by: HOSPITALIST

## 2024-06-27 PROCEDURE — 97110 THERAPEUTIC EXERCISES: CPT

## 2024-06-27 PROCEDURE — 25000242 PHARM REV CODE 250 ALT 637 W/ HCPCS: Performed by: INTERNAL MEDICINE

## 2024-06-27 PROCEDURE — 85025 COMPLETE CBC W/AUTO DIFF WBC: CPT | Performed by: HOSPITALIST

## 2024-06-27 PROCEDURE — 99900035 HC TECH TIME PER 15 MIN (STAT)

## 2024-06-27 PROCEDURE — 80048 BASIC METABOLIC PNL TOTAL CA: CPT | Performed by: HOSPITALIST

## 2024-06-27 PROCEDURE — 94761 N-INVAS EAR/PLS OXIMETRY MLT: CPT

## 2024-06-27 PROCEDURE — 85610 PROTHROMBIN TIME: CPT | Performed by: HOSPITALIST

## 2024-06-27 PROCEDURE — 21400001 HC TELEMETRY ROOM

## 2024-06-27 PROCEDURE — 94660 CPAP INITIATION&MGMT: CPT

## 2024-06-27 RX ADMIN — FLUTICASONE PROPIONATE 50 MCG: 50 SPRAY, METERED NASAL at 08:06

## 2024-06-27 RX ADMIN — DOXYCYCLINE HYCLATE 100 MG: 100 TABLET, COATED ORAL at 08:06

## 2024-06-27 RX ADMIN — INSULIN ASPART 5 UNITS: 100 INJECTION, SUSPENSION SUBCUTANEOUS at 07:06

## 2024-06-27 RX ADMIN — DULOXETINE HYDROCHLORIDE 60 MG: 30 CAPSULE, DELAYED RELEASE ORAL at 08:06

## 2024-06-27 RX ADMIN — IPRATROPIUM BROMIDE AND ALBUTEROL SULFATE 3 ML: 2.5; .5 SOLUTION RESPIRATORY (INHALATION) at 08:06

## 2024-06-27 RX ADMIN — HYDROCODONE BITARTRATE AND ACETAMINOPHEN 1 TABLET: 7.5; 325 TABLET ORAL at 03:06

## 2024-06-27 RX ADMIN — ATORVASTATIN CALCIUM 40 MG: 40 TABLET, FILM COATED ORAL at 08:06

## 2024-06-27 RX ADMIN — PANTOPRAZOLE SODIUM 40 MG: 40 GRANULE, DELAYED RELEASE ORAL at 08:06

## 2024-06-27 RX ADMIN — GABAPENTIN 900 MG: 300 CAPSULE ORAL at 08:06

## 2024-06-27 RX ADMIN — IPRATROPIUM BROMIDE AND ALBUTEROL SULFATE 3 ML: 2.5; .5 SOLUTION RESPIRATORY (INHALATION) at 02:06

## 2024-06-27 RX ADMIN — GUAIFENESIN 200 MG: 200 SOLUTION ORAL at 08:06

## 2024-06-27 RX ADMIN — INSULIN ASPART 5 UNITS: 100 INJECTION, SUSPENSION SUBCUTANEOUS at 04:06

## 2024-06-27 RX ADMIN — WARFARIN SODIUM 10 MG: 5 TABLET ORAL at 04:06

## 2024-06-27 RX ADMIN — FUROSEMIDE 40 MG: 40 TABLET ORAL at 09:06

## 2024-06-27 NOTE — PLAN OF CARE
Problem: Physical Therapy  Goal: Physical Therapy Goal  Description: Goals to be met by: 24     Patient will increase functional independence with mobility by performin. Pt to be SBA with bed mobility.  2. Pt to transfer with CGA.  3. Pt to ambulate 50' w/RW CGA.  4. Pt to be (I) with HEP.    Outcome: Progressing   Pt OOB in chair via nurse

## 2024-06-27 NOTE — PLAN OF CARE
11:10am: Spoke to Camilla with [facility], [phone]; she stated agency willing to accept patient. Per [facility], [facility] Karen DIEGO Phone: [phone] also willing to provide palliative care to patient.

## 2024-06-27 NOTE — ASSESSMENT & PLAN NOTE
Continue antibiotics for now wean oxygen to his home levels of 4 L.  Follow up on sputum and blood cultures.  Fever  is resolved,blood culture no growth.    -1/4 blood cultures growing Gram-positive cocci, id and sensitivity pending. ?  Contaminant

## 2024-06-27 NOTE — ASSESSMENT & PLAN NOTE
This patient does have evidence of infective focus  My overall impression is sepsis.  Source: Respiratory  Antibiotics given-   Antibiotics (72h ago, onward)    Start     Stop Route Frequency Ordered    06/25/24 1300  doxycycline tablet 100 mg         -- Oral Every 12 hours 06/25/24 1147        Latest lactate reviewed-  Recent Labs   Lab 06/24/24  2333 06/25/24  0327   LACTATE  --  1.2   POCLAC 0.50  --        No end-organ damage noted and no fluid challenge required due to lactic acid being normal and blood pressure systolic >90/MAP>65.  Source control achieved by:  Continue IV antibiotics for now.  Order sputum culture and follow up on blood cultures.

## 2024-06-27 NOTE — ASSESSMENT & PLAN NOTE
Creatinine baseline closer to 1.5-1.6.  Currently mildly elevated at 1.9.  Continue to monitor for now especially monitor for bouts of hypotension given sepsis.  Blood pressure medications are resumed but with hold parameters for low normal blood pressure.  Follow up on repeat labs.    -trend renal function, creatinine down to 1.3  -avoid NSAIDs and nephrotoxic agents

## 2024-06-27 NOTE — PLAN OF CARE
Plan of care reviewed with patient.  Patient verbalized understanding and had no further questions.  Patient continues antibiotics throughout the shift.  Patient sat up in chair most of the day and able to work with PT/OT this shift.  Patient now resting comfortably in bed locked in lowest position, side rails up x3, and call bell in reach.  Will continue to monitor.       Problem: Adult Inpatient Plan of Care  Goal: Plan of Care Review  Outcome: Progressing  Goal: Patient-Specific Goal (Individualized)  Outcome: Progressing  Goal: Absence of Hospital-Acquired Illness or Injury  Outcome: Progressing  Goal: Optimal Comfort and Wellbeing  Outcome: Progressing  Goal: Readiness for Transition of Care  Outcome: Progressing     Problem: Diabetes Comorbidity  Goal: Blood Glucose Level Within Targeted Range  Outcome: Progressing     Problem: Skin Injury Risk Increased  Goal: Skin Health and Integrity  Outcome: Progressing     Problem: Fall Injury Risk  Goal: Absence of Fall and Fall-Related Injury  Outcome: Progressing     Problem: Sepsis/Septic Shock  Goal: Optimal Coping  Outcome: Progressing  Goal: Absence of Bleeding  Outcome: Progressing  Goal: Blood Glucose Level Within Targeted Range  Outcome: Progressing  Goal: Absence of Infection Signs and Symptoms  Outcome: Progressing  Goal: Optimal Nutrition Intake  Outcome: Progressing     Problem: Pneumonia  Goal: Fluid Balance  Outcome: Progressing  Goal: Resolution of Infection Signs and Symptoms  Outcome: Progressing  Goal: Effective Oxygenation and Ventilation  Outcome: Progressing     Problem: Coping Ineffective  Goal: Effective Coping  Outcome: Progressing

## 2024-06-27 NOTE — ASSESSMENT & PLAN NOTE
Patient's FSGs are uncontrolled due to hyperglycemia on current medication regimen.  Last A1c reviewed-   Lab Results   Component Value Date    LABA1C 6.8 10/09/2017    HGBA1C 9.8 (H) 03/18/2024     Most recent fingerstick glucose reviewed-   Recent Labs   Lab 06/27/24  0719   POCTGLUCOSE 235*       Current correctional scale  Medium  Maintain anti-hyperglycemic dose as follows-   Antihyperglycemics (From admission, onward)    Start     Stop Route Frequency Ordered    06/25/24 0715  insulin aspart protamine-insulin aspart (NovoLOG 70/30) injection         -- SubQ 2 times daily with meals 06/25/24 0250        Hold Oral hypoglycemics while patient is in the hospital.  Diet adjusted.

## 2024-06-27 NOTE — SUBJECTIVE & OBJECTIVE
Interval History:  Patient is seen today for follow-up care.  Patient being treated for right we will lobe pneumonia and LEANDRO on CKD 3.  Patient was 1/4 growing Gram-positive cocci, id and sensitivity pending.  Possible contaminant.  Patient's laboratories show creatinine of 1.3, PT INR 2.5, WBC 7.7, sodium 130.  PT and OT to evaluate and treat.  Patient declined rehab.  Prefers to go home on discharge time.    Review of Systems   Constitutional: Negative.    HENT: Negative.     Eyes: Negative.    Respiratory: Negative.     Cardiovascular: Negative.    Gastrointestinal: Negative.    Endocrine: Negative.    Genitourinary: Negative.    Musculoskeletal: Negative.    Skin: Negative.    Allergic/Immunologic: Negative.    Neurological: Negative.    Hematological: Negative.    Psychiatric/Behavioral: Negative.       Objective:     Vital Signs (Most Recent):  Temp: 98 °F (36.7 °C) (06/27/24 0718)  Pulse: 103 (06/27/24 0808)  Resp: 19 (06/27/24 0808)  BP: (!) 140/69 (06/27/24 0718)  SpO2: 95 % (06/27/24 0808) Vital Signs (24h Range):  Temp:  [97.5 °F (36.4 °C)-99.1 °F (37.3 °C)] 98 °F (36.7 °C)  Pulse:  [] 103  Resp:  [14-20] 19  SpO2:  [92 %-99 %] 95 %  BP: (120-156)/(65-88) 140/69     Weight: 93 kg (205 lb)  Body mass index is 31.17 kg/m².    Intake/Output Summary (Last 24 hours) at 6/27/2024 0941  Last data filed at 6/26/2024 1703  Gross per 24 hour   Intake 400 ml   Output 200 ml   Net 200 ml         Physical Exam  Vitals and nursing note reviewed.   Constitutional:       Appearance: Normal appearance.   HENT:      Head: Normocephalic and atraumatic.      Right Ear: External ear normal.      Left Ear: External ear normal.      Nose: Nose normal.      Mouth/Throat:      Mouth: Mucous membranes are moist.      Pharynx: Oropharynx is clear.   Eyes:      Extraocular Movements: Extraocular movements intact.      Conjunctiva/sclera: Conjunctivae normal.      Pupils: Pupils are equal, round, and reactive to light.    Cardiovascular:      Rate and Rhythm: Normal rate and regular rhythm.      Pulses: Normal pulses.      Heart sounds: Normal heart sounds.   Pulmonary:      Effort: Pulmonary effort is normal.      Breath sounds: Rhonchi present.   Abdominal:      General: Abdomen is flat. Bowel sounds are normal.      Palpations: Abdomen is soft.   Musculoskeletal:         General: Normal range of motion.   Skin:     General: Skin is warm.      Capillary Refill: Capillary refill takes less than 2 seconds.   Neurological:      General: No focal deficit present.      Mental Status: He is alert and oriented to person, place, and time. Mental status is at baseline.      Cranial Nerves: No cranial nerve deficit.   Psychiatric:         Mood and Affect: Mood normal.         Thought Content: Thought content normal.         Judgment: Judgment normal.             Significant Labs: All pertinent labs within the past 24 hours have been reviewed.    CBC:   Recent Labs   Lab 06/26/24  0445 06/27/24  0615   WBC 7.08 7.76   HGB 10.0* 9.8*   HCT 31.2* 30.2*    170     CMP:   Recent Labs   Lab 06/26/24  0445 06/27/24  0615   * 130*   K 4.4 4.2   CL 97 95   CO2 22* 25   * 175*   BUN 25* 20   CREATININE 1.6* 1.3   CALCIUM 8.7 8.7   ANIONGAP 12 10     Coagulation:   Recent Labs   Lab 06/27/24  0615   INR 2.5*         Significant Imaging: I have reviewed all pertinent imaging results/findings within the past 24 hours.

## 2024-06-27 NOTE — NURSING
[facility] [place]  Nurses Note -- 4 Eyes      6/26/2024       Skin assessed on: Q Shift      [x] No Pressure Injuries Present    [x]Prevention Measures Documented    [] Yes LDA  for Pressure Injury Previously documented     [] Yes New Pressure Injury Discovered   [] LDA for New Pressure Injury Added      Attending RN:  Roselia Rivas RN     Second RN:  RADHA Cordoba

## 2024-06-27 NOTE — ASSESSMENT & PLAN NOTE
Patient's FSGs are controlled on current medication regimen.  Last A1c reviewed-   Lab Results   Component Value Date    LABA1C 6.8 10/09/2017    HGBA1C 9.8 (H) 03/18/2024     Most recent fingerstick glucose reviewed-   Recent Labs   Lab 06/27/24  0719   POCTGLUCOSE 235*       Current correctional scale  Medium  Maintain anti-hyperglycemic dose as follows-   Antihyperglycemics (From admission, onward)    Start     Stop Route Frequency Ordered    06/25/24 0715  insulin aspart protamine-insulin aspart (NovoLOG 70/30) injection         -- SubQ 2 times daily with meals 06/25/24 0250        Hold Oral hypoglycemics while patient is in the hospital.

## 2024-06-27 NOTE — PT/OT/SLP PROGRESS
"Physical Therapy Treatment    Patient Name:  Percy Ramirez   MRN:  [id number]    Recommendations:     Discharge Recommendations: Moderate Intensity Therapy  Discharge Equipment Recommendations: none      Assessment:     Percy Ramirez is a 83 y.o. male admitted with a medical diagnosis of <principal problem not specified>.  He presents with the following impairments/functional limitations: weakness, impaired endurance, impaired self care skills, impaired functional mobility, gait instability, impaired balance, decreased upper extremity function, decreased lower extremity function, decreased safety awareness, impaired cardiopulmonary response to activity, edema, impaired skin .    Rehab Prognosis: Good; patient would benefit from acute skilled PT services to address these deficits and reach maximum level of function.    Recent Surgery: * No surgery found *      Plan:     During this hospitalization, patient to be seen 5 x/week to address the identified rehab impairments via gait training, therapeutic activities, therapeutic exercises and progress toward the following goals:    Plan of Care Expires:  07/02/24    Subjective     Chief Complaint: "You're wearing me out!"  Patient/Family Comments/goals: Pt agreeable to PT treatment.  Pain/Comfort:  Pain Rating 1: 0/10      Objective:     Communicated with nurseCELINE prior to session.  Patient found up in chair with telemetry, oxygen upon PT entry to room.     General Precautions: Standard, fall  Orthopedic Precautions: N/A  Braces: N/A  Respiratory Status: Nasal cannula, flow 3 L/min     Functional Mobility:  Performed arm chair push ups; sit<>squat x10 reps and mod A      AM-PAC 6 CLICK MOBILITY  Turning over in bed (including adjusting bedclothes, sheets and blankets)?: 3  Sitting down on and standing up from a chair with arms (e.g., wheelchair, bedside commode, etc.): 2  Moving from lying on back to sitting on the side of the bed?: 3  Moving to and from a bed to a chair " (including a wheelchair)?: 2  Need to walk in hospital room?: 3  Climbing 3-5 steps with a railing?: 1  Basic Mobility Total Score: 14       Treatment & Education:  Performed 1x10 reps BLE exs seated including Aps, LAQ ,hip abd/add, hip flexion and adduction squeezes.    Patient left  reclined in chair  with all lines intact, call button in reach, and all needs in reach .    GOALS:   Multidisciplinary Problems       Physical Therapy Goals          Problem: Physical Therapy    Goal Priority Disciplines Outcome Goal Variances Interventions   Physical Therapy Goal     PT, PT/OT Progressing     Description: Goals to be met by: 24     Patient will increase functional independence with mobility by performin. Pt to be SBA with bed mobility.  2. Pt to transfer with CGA.  3. Pt to ambulate 50' w/RW CGA.  4. Pt to be (I) with HEP.                         Time Tracking:     PT Received On: 24  PT Start Time: 1040     PT Stop Time: 1052  PT Total Time (min): 12 min     Billable Minutes: Therapeutic Exercise 12    Treatment Type: Treatment  PT/PTA: PT     Number of PTA visits since last PT visit: 2024

## 2024-06-27 NOTE — NURSING
Received a call from tele Dianpingker that patient had 6 beats of vtach, checked on the patient how is he doing he said he feels fine, no chest pain, headache, dizziness, lightheadedness, sob,  just a dry mouth, Rashed Mahmud informed he said continue to monitor the patient.

## 2024-06-27 NOTE — ASSESSMENT & PLAN NOTE
Patient with Persistent (7 days or more) atrial fibrillation which is controlled currently with Beta Blocker. Patient is currently in sinus rhythm.[id number] Score: 4. Anticoagulation indicated. Anticoagulation done with Coumadin.  INR in appropriate range.  Continue home dose of Coumadin and monitor closely given use of antibiotics at this time .    -patient on Coumadin, INR 2.5

## 2024-06-27 NOTE — PROGRESS NOTES
[facility] Medicine  Progress Note    Patient Name: Percy Ramirez  MRN: [id number]  Patient Class: IP- Inpatient   Admission Date: 6/24/2024  Length of Stay: 2 days  Attending Physician: Kush Mosquera MD  Primary Care Provider: Kasie Edmondson MD        Subjective:     Principal Problem:<principal problem not specified>        HPI:  83-year-old  male with past medical history significant for non-insulin-dependent type 2 diabetes, essential hypertension, COPD (on 4L  home oxygen)/ILD, h/o CVA,  permanent atrial fibrillation (on Coumadin 10 mg every day except for 7.5 mg on Monday Wednesday Friday), CAD (s/p 2V CABG 2024 and follow with Dr. Melissa/[facility]) presents to the ER with fevers and reports a generalized weakness and chills.  States increased cough but always has a cough.  Denies any productive sputum.  States he was short of breath and has dyspnea on exertion worsened normal.  With complaining of abdominal pain but no reports of nausea vomiting or diarrhea.    Workup in the ER for mildly elevated white blood count of 10.  Lactic acid was normal.  Liver enzymes were normal.  Pro count was normal.  His INR was 2.  Urine was not consistent with urinary tract infection.  COVID/FLU negative.     Chest x-ray:  Median sternotomy changes are present.  AP portable chest radiograph demonstrates a cardiac silhouette within normal limits.  Vascular calcifications are seen at the aortic knob.  There is nonspecific prominence of the interstitium.  There is no pneumothorax.  There is no focal consolidation, pneumothorax, or pleural effusion.     CT of the abdomen  1.. No evidence of hydronephrosis/obstructive uropathy bilaterally.  Nonobstructing right nephrolithiasis.  2. Right lower lobe consolidative change superimposed upon a background of diffuse emphysematous change.  Correlation for infectious process advised.  3. Cholelithiasis noting mild distention of the gallbladder.   Further assessment as warranted.  4. Left inguinal hernia containing fat and a portion of the urinary bladder.  5. 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    Abdominal ultrasound:   Cholelithiasis and distended gallbladder.  No definite secondary sonographic evidence to suggest acute cholecystitis at this time noting there is no gallbladder wall thickening or hyperemia and reported negative sonographic Cervantes sign although the patient received pain medication prior to study which can limit assessment.  Clinical correlation and further assessment as warranted.      Due to  His port score is 121 he was started  IV abx Vanc and Zosyn to cover for possible Right lower lobe PNA.      Because this patient's clinical condition is guarded due to his sepsis and requires IVF abx and close monitoring of his respiratory status, care in an alternative location isn't clinically appropriate for the reasons stated above.     We have been consulted for further management and inpatient telemetry admission to hospitalist Medicine Service.           Overview/Hospital Course:  Patient with past medical history significant for non-insulin-dependent type 2 diabetes, essential hypertension, COPD (on 4L home oxygen)/ILD, h/o CVA, permanent atrial fibrillation (on Coumadin 10 mg every day except for 7.5 mg on Monday Wednesday Friday), CAD (s/p 2V CABG 2024 and follow with Dr. Melissa/[facility]) presents to the ER with fevers and reports a generalized weakness and chill ,patient is on IV Abx for URI.consulted ST for possible aspiration,on soft diet.  Consulted PT,OT,recommending SNF,but family wasn't go home with HH.  Fever  is resolved,blood culture no growth.    Interval History:  Patient is seen today for follow-up care.  Patient being treated for  right we will lobe pneumonia and LEANDRO on CKD 3.  Patient was 1/4 growing Gram-positive cocci, id and sensitivity pending.  Possible contaminant.  Patient's laboratories show creatinine of 1.3, PT INR 2.5, WBC 7.7, sodium 130.  PT and OT to evaluate and treat.  Patient declined rehab.  Prefers to go home on discharge time.    Review of Systems   Constitutional: Negative.    HENT: Negative.     Eyes: Negative.    Respiratory: Negative.     Cardiovascular: Negative.    Gastrointestinal: Negative.    Endocrine: Negative.    Genitourinary: Negative.    Musculoskeletal: Negative.    Skin: Negative.    Allergic/Immunologic: Negative.    Neurological: Negative.    Hematological: Negative.    Psychiatric/Behavioral: Negative.       Objective:     Vital Signs (Most Recent):  Temp: 98 °F (36.7 °C) (06/27/24 0718)  Pulse: 103 (06/27/24 0808)  Resp: 19 (06/27/24 0808)  BP: (!) 140/69 (06/27/24 0718)  SpO2: 95 % (06/27/24 0808) Vital Signs (24h Range):  Temp:  [97.5 °F (36.4 °C)-99.1 °F (37.3 °C)] 98 °F (36.7 °C)  Pulse:  [] 103  Resp:  [14-20] 19  SpO2:  [92 %-99 %] 95 %  BP: (120-156)/(65-88) 140/69     Weight: 93 kg (205 lb)  Body mass index is 31.17 kg/m².    Intake/Output Summary (Last 24 hours) at 6/27/2024 0941  Last data filed at 6/26/2024 1703  Gross per 24 hour   Intake 400 ml   Output 200 ml   Net 200 ml         Physical Exam  Vitals and nursing note reviewed.   Constitutional:       Appearance: Normal appearance.   HENT:      Head: Normocephalic and atraumatic.      Right Ear: External ear normal.      Left Ear: External ear normal.      Nose: Nose normal.      Mouth/Throat:      Mouth: Mucous membranes are moist.      Pharynx: Oropharynx is clear.   Eyes:      Extraocular Movements: Extraocular movements intact.      Conjunctiva/sclera: Conjunctivae normal.      Pupils: Pupils are equal, round, and reactive to light.   Cardiovascular:      Rate and Rhythm: Normal rate and regular rhythm.      Pulses: Normal  pulses.      Heart sounds: Normal heart sounds.   Pulmonary:      Effort: Pulmonary effort is normal.      Breath sounds: Rhonchi present.   Abdominal:      General: Abdomen is flat. Bowel sounds are normal.      Palpations: Abdomen is soft.   Musculoskeletal:         General: Normal range of motion.   Skin:     General: Skin is warm.      Capillary Refill: Capillary refill takes less than 2 seconds.   Neurological:      General: No focal deficit present.      Mental Status: He is alert and oriented to person, place, and time. Mental status is at baseline.      Cranial Nerves: No cranial nerve deficit.   Psychiatric:         Mood and Affect: Mood normal.         Thought Content: Thought content normal.         Judgment: Judgment normal.             Significant Labs: All pertinent labs within the past 24 hours have been reviewed.    CBC:   Recent Labs   Lab 06/26/24  0445 06/27/24  0615   WBC 7.08 7.76   HGB 10.0* 9.8*   HCT 31.2* 30.2*    170     CMP:   Recent Labs   Lab 06/26/24  0445 06/27/24  0615   * 130*   K 4.4 4.2   CL 97 95   CO2 22* 25   * 175*   BUN 25* 20   CREATININE 1.6* 1.3   CALCIUM 8.7 8.7   ANIONGAP 12 10     Coagulation:   Recent Labs   Lab 06/27/24  0615   INR 2.5*         Significant Imaging: I have reviewed all pertinent imaging results/findings within the past 24 hours.    Assessment/Plan:      Advance care planning        Dysphagia        Sepsis without acute organ dysfunction  This patient does have evidence of infective focus  My overall impression is sepsis.  Source: Respiratory  Antibiotics given-   Antibiotics (72h ago, onward)      Start     Stop Route Frequency Ordered    06/25/24 1300  doxycycline tablet 100 mg         -- Oral Every 12 hours 06/25/24 1147          Latest lactate reviewed-  Recent Labs   Lab 06/24/24  2333 06/25/24  0327   LACTATE  --  1.2   POCLAC 0.50  --        No end-organ damage noted and no fluid challenge required due to lactic acid being  normal and blood pressure systolic >90/MAP>65.  Source control achieved by:  Continue IV antibiotics for now.  Order sputum culture and follow up on blood cultures.        Pneumonia of right lower lobe due to infectious organism  Continue antibiotics for now wean oxygen to his home levels of 4 L.  Follow up on sputum and blood cultures.  Fever  is resolved,blood culture no growth.    -1/4 blood cultures growing Gram-positive cocci, id and sensitivity pending. ?  Contaminant    Chronic kidney disease, stage 3a  Creatinine baseline closer to 1.5-1.6.  Currently mildly elevated at 1.9.  Continue to monitor for now especially monitor for bouts of hypotension given sepsis.  Blood pressure medications are resumed but with hold parameters for low normal blood pressure.  Follow up on repeat labs.    -trend renal function, creatinine down to 1.3  -avoid NSAIDs and nephrotoxic agents    History of stroke  -continue Lipitor, patient on Coumadin for history of atrial fibrillation      ILD (interstitial lung disease)  On 4 liter home oxygen.  -follow up in outpatient pulmonary clinic      Persistent atrial fibrillation  Patient with Persistent (7 days or more) atrial fibrillation which is controlled currently with Beta Blocker. Patient is currently in sinus rhythm.[id number] Score: 4. Anticoagulation indicated. Anticoagulation done with Coumadin.  INR in appropriate range.  Continue home dose of Coumadin and monitor closely given use of antibiotics at this time .    -patient on Coumadin, INR 2.5    COPD with acute exacerbation  Continue home oxygen.  Scheduled and p.r.n. DuoNebs ordered.  We will hold off on IV steroids at this time given uncontrolled diabetes as well as sepsis/pneumonic process. Treat pneumonia.    Type 2 diabetes, with peripheral circulatory disorder not at goal  Patient's FSGs are controlled on current medication regimen.  Last A1c reviewed-   Lab Results   Component Value Date    LABA1C 6.8 10/09/2017     HGBA1C 9.8 (H) 03/18/2024     Most recent fingerstick glucose reviewed-   Recent Labs   Lab 06/27/24  0719   POCTGLUCOSE 235*       Current correctional scale  Medium  Maintain anti-hyperglycemic dose as follows-   Antihyperglycemics (From admission, onward)      Start     Stop Route Frequency Ordered    06/25/24 0715  insulin aspart protamine-insulin aspart (NovoLOG 70/30) injection         -- SubQ 2 times daily with meals 06/25/24 0250          Hold Oral hypoglycemics while patient is in the hospital.    Poorly controlled type 2 diabetes mellitus with neuropathy  Patient's FSGs are uncontrolled due to hyperglycemia on current medication regimen.  Last A1c reviewed-   Lab Results   Component Value Date    LABA1C 6.8 10/09/2017    HGBA1C 9.8 (H) 03/18/2024     Most recent fingerstick glucose reviewed-   Recent Labs   Lab 06/27/24  0719   POCTGLUCOSE 235*       Current correctional scale  Medium  Maintain anti-hyperglycemic dose as follows-   Antihyperglycemics (From admission, onward)      Start     Stop Route Frequency Ordered    06/25/24 0715  insulin aspart protamine-insulin aspart (NovoLOG 70/30) injection         -- SubQ 2 times daily with meals 06/25/24 0250          Hold Oral hypoglycemics while patient is in the hospital.  Diet adjusted.    Macular degeneration  Follow up in clinic      Coronary artery disease  No complaints of chest pain at this time.  Continue home medications.  Monitor on telemetry.  Support blood pressure and heart rate.      VTE Risk Mitigation (From admission, onward)           Ordered     warfarin tablet 7.5 mg  Every Mon, Wed, Fri         06/25/24 0654     warfarin (COUMADIN) tablet 10 mg  Every Tues, Thurs, Sat, Sun         06/25/24 0654                    Discharge Planning   TATI:      Code Status: Full Code   Is the patient medically ready for discharge?:     Reason for patient still in hospital (select all that apply): Patient trending condition, Laboratory test, Treatment, and  PT / OT recommendations  Discharge Plan A: Home with family, NP at home                  Kush Mosquera MD  Department of Hospital Medicine   [facility] S[facility]

## 2024-06-28 LAB
ANION GAP SERPL CALC-SCNC: 10 MMOL/L (ref 8–16)
BASOPHILS # BLD AUTO: 0.05 K/UL (ref 0–0.2)
BASOPHILS NFR BLD: 0.8 % (ref 0–1.9)
BUN SERPL-MCNC: 21 MG/DL (ref 8–23)
CALCIUM SERPL-MCNC: 8.9 MG/DL (ref 8.7–10.5)
CHLORIDE SERPL-SCNC: 95 MMOL/L (ref 95–110)
CO2 SERPL-SCNC: 26 MMOL/L (ref 23–29)
CREAT SERPL-MCNC: 1.3 MG/DL (ref 0.5–1.4)
DIFFERENTIAL METHOD BLD: ABNORMAL
EOSINOPHIL # BLD AUTO: 0.4 K/UL (ref 0–0.5)
EOSINOPHIL NFR BLD: 5.7 % (ref 0–8)
ERYTHROCYTE [DISTWIDTH] IN BLOOD BY AUTOMATED COUNT: 14.2 % (ref 11.5–14.5)
EST. GFR  (NO RACE VARIABLE): 55 ML/MIN/1.73 M^2
GLUCOSE SERPL-MCNC: 211 MG/DL (ref 70–110)
HCT VFR BLD AUTO: 30.9 % (ref 40–54)
HGB BLD-MCNC: 10.4 G/DL (ref 14–18)
IMM GRANULOCYTES # BLD AUTO: 0.09 K/UL (ref 0–0.04)
IMM GRANULOCYTES NFR BLD AUTO: 1.4 % (ref 0–0.5)
INR PPP: 3.4 (ref 0.8–1.2)
LYMPHOCYTES # BLD AUTO: 0.6 K/UL (ref 1–4.8)
LYMPHOCYTES NFR BLD: 9.7 % (ref 18–48)
MCH RBC QN AUTO: 29.1 PG (ref 27–31)
MCHC RBC AUTO-ENTMCNC: 33.7 G/DL (ref 32–36)
MCV RBC AUTO: 87 FL (ref 82–98)
MONOCYTES # BLD AUTO: 1 K/UL (ref 0.3–1)
MONOCYTES NFR BLD: 14.9 % (ref 4–15)
NEUTROPHILS # BLD AUTO: 4.4 K/UL (ref 1.8–7.7)
NEUTROPHILS NFR BLD: 67.5 % (ref 38–73)
NRBC BLD-RTO: 0 /100 WBC
PLATELET # BLD AUTO: 203 K/UL (ref 150–450)
PMV BLD AUTO: 9.9 FL (ref 9.2–12.9)
POTASSIUM SERPL-SCNC: 4.4 MMOL/L (ref 3.5–5.1)
PROTHROMBIN TIME: 34.3 SEC (ref 9–12.5)
RBC # BLD AUTO: 3.57 M/UL (ref 4.6–6.2)
SODIUM SERPL-SCNC: 131 MMOL/L (ref 136–145)
WBC # BLD AUTO: 6.5 K/UL (ref 3.9–12.7)

## 2024-06-28 PROCEDURE — 94761 N-INVAS EAR/PLS OXIMETRY MLT: CPT

## 2024-06-28 PROCEDURE — 25000003 PHARM REV CODE 250: Performed by: INTERNAL MEDICINE

## 2024-06-28 PROCEDURE — 63600175 PHARM REV CODE 636 W HCPCS: Performed by: INTERNAL MEDICINE

## 2024-06-28 PROCEDURE — 25000003 PHARM REV CODE 250: Performed by: HOSPITALIST

## 2024-06-28 PROCEDURE — 80048 BASIC METABOLIC PNL TOTAL CA: CPT | Performed by: HOSPITALIST

## 2024-06-28 PROCEDURE — 36415 COLL VENOUS BLD VENIPUNCTURE: CPT | Performed by: HOSPITALIST

## 2024-06-28 PROCEDURE — 97530 THERAPEUTIC ACTIVITIES: CPT | Mod: CQ

## 2024-06-28 PROCEDURE — 94799 UNLISTED PULMONARY SVC/PX: CPT

## 2024-06-28 PROCEDURE — 99900035 HC TECH TIME PER 15 MIN (STAT)

## 2024-06-28 PROCEDURE — 97535 SELF CARE MNGMENT TRAINING: CPT

## 2024-06-28 PROCEDURE — 27000190 HC CPAP FULL FACE MASK W/VALVE

## 2024-06-28 PROCEDURE — 94660 CPAP INITIATION&MGMT: CPT

## 2024-06-28 PROCEDURE — 94640 AIRWAY INHALATION TREATMENT: CPT

## 2024-06-28 PROCEDURE — 21400001 HC TELEMETRY ROOM

## 2024-06-28 PROCEDURE — 97110 THERAPEUTIC EXERCISES: CPT

## 2024-06-28 PROCEDURE — 85610 PROTHROMBIN TIME: CPT | Performed by: HOSPITALIST

## 2024-06-28 PROCEDURE — 25000242 PHARM REV CODE 250 ALT 637 W/ HCPCS: Performed by: INTERNAL MEDICINE

## 2024-06-28 PROCEDURE — 27000221 HC OXYGEN, UP TO 24 HOURS

## 2024-06-28 PROCEDURE — 27100171 HC OXYGEN HIGH FLOW UP TO 24 HOURS

## 2024-06-28 PROCEDURE — 85025 COMPLETE CBC W/AUTO DIFF WBC: CPT | Performed by: HOSPITALIST

## 2024-06-28 PROCEDURE — 97116 GAIT TRAINING THERAPY: CPT | Mod: CQ

## 2024-06-28 RX ORDER — INSULIN ASPART 100 [IU]/ML
15 INJECTION, SUSPENSION SUBCUTANEOUS
Status: DISCONTINUED | OUTPATIENT
Start: 2024-06-29 | End: 2024-06-30 | Stop reason: HOSPADM

## 2024-06-28 RX ORDER — INSULIN ASPART 100 [IU]/ML
10 INJECTION, SUSPENSION SUBCUTANEOUS
Status: DISCONTINUED | OUTPATIENT
Start: 2024-06-28 | End: 2024-06-30 | Stop reason: HOSPADM

## 2024-06-28 RX ADMIN — FUROSEMIDE 40 MG: 40 TABLET ORAL at 09:06

## 2024-06-28 RX ADMIN — DOXYCYCLINE HYCLATE 100 MG: 100 TABLET, COATED ORAL at 09:06

## 2024-06-28 RX ADMIN — IPRATROPIUM BROMIDE AND ALBUTEROL SULFATE 3 ML: 2.5; .5 SOLUTION RESPIRATORY (INHALATION) at 07:06

## 2024-06-28 RX ADMIN — INSULIN ASPART 5 UNITS: 100 INJECTION, SUSPENSION SUBCUTANEOUS at 09:06

## 2024-06-28 RX ADMIN — GABAPENTIN 900 MG: 300 CAPSULE ORAL at 09:06

## 2024-06-28 RX ADMIN — GUAIFENESIN 200 MG: 200 SOLUTION ORAL at 07:06

## 2024-06-28 RX ADMIN — CEFTRIAXONE 1 G: 1 INJECTION, POWDER, FOR SOLUTION INTRAMUSCULAR; INTRAVENOUS at 12:06

## 2024-06-28 RX ADMIN — FLUTICASONE PROPIONATE 50 MCG: 50 SPRAY, METERED NASAL at 09:06

## 2024-06-28 RX ADMIN — ATORVASTATIN CALCIUM 40 MG: 40 TABLET, FILM COATED ORAL at 09:06

## 2024-06-28 RX ADMIN — PANTOPRAZOLE SODIUM 40 MG: 40 GRANULE, DELAYED RELEASE ORAL at 09:06

## 2024-06-28 RX ADMIN — IPRATROPIUM BROMIDE AND ALBUTEROL SULFATE 3 ML: 2.5; .5 SOLUTION RESPIRATORY (INHALATION) at 08:06

## 2024-06-28 RX ADMIN — DULOXETINE HYDROCHLORIDE 60 MG: 30 CAPSULE, DELAYED RELEASE ORAL at 09:06

## 2024-06-28 RX ADMIN — INSULIN ASPART 10 UNITS: 100 INJECTION, SUSPENSION SUBCUTANEOUS at 05:06

## 2024-06-28 RX ADMIN — IPRATROPIUM BROMIDE AND ALBUTEROL SULFATE 3 ML: 2.5; .5 SOLUTION RESPIRATORY (INHALATION) at 02:06

## 2024-06-28 RX ADMIN — GUAIFENESIN 200 MG: 200 SOLUTION ORAL at 09:06

## 2024-06-28 NOTE — SUBJECTIVE & OBJECTIVE
Interval History:  Patient is seen today for follow-up care.  Patient being treated for right lung pneumonia.  Patient feels his shortness of breath symptoms are gradually improving.  No chest pain or vomiting.  Patient's blood cultures 1/2 growing gram positive cocci coagulase-negative staph, likely  contaminant.        Review of Systems   Constitutional: Negative.    HENT: Negative.     Eyes: Negative.    Respiratory: Negative.     Cardiovascular: Negative.    Gastrointestinal: Negative.    Endocrine: Negative.    Genitourinary: Negative.    Musculoskeletal: Negative.    Skin: Negative.    Allergic/Immunologic: Negative.    Neurological: Negative.    Hematological: Negative.    Psychiatric/Behavioral: Negative.       Objective:     Vital Signs (Most Recent):  Temp: 97.8 °F (36.6 °C) (06/28/24 0649)  Pulse: 96 (06/28/24 0805)  Resp: 20 (06/28/24 0805)  BP: (!) 146/75 (06/28/24 0649)  SpO2: 95 % (06/28/24 0805) Vital Signs (24h Range):  Temp:  [97.7 °F (36.5 °C)-98.9 °F (37.2 °C)] 97.8 °F (36.6 °C)  Pulse:  [] 96  Resp:  [14-20] 20  SpO2:  [90 %-99 %] 95 %  BP: (124-179)/(58-98) 146/75     Weight: 93 kg (205 lb)  Body mass index is 31.17 kg/m².    Intake/Output Summary (Last 24 hours) at 6/28/2024 1034  Last data filed at 6/28/2024 0948  Gross per 24 hour   Intake 840 ml   Output --   Net 840 ml         Physical Exam  Vitals and nursing note reviewed.   Constitutional:       Appearance: Normal appearance. He is normal weight.   HENT:      Head: Normocephalic and atraumatic.      Right Ear: External ear normal.      Left Ear: External ear normal.      Nose: Nose normal.      Mouth/Throat:      Mouth: Mucous membranes are moist.      Pharynx: Oropharynx is clear.   Eyes:      Extraocular Movements: Extraocular movements intact.      Conjunctiva/sclera: Conjunctivae normal.      Pupils: Pupils are equal, round, and reactive to light.   Cardiovascular:      Rate and Rhythm: Normal rate and regular rhythm.       "Pulses: Normal pulses.      Heart sounds: Normal heart sounds.   Pulmonary:      Effort: Pulmonary effort is normal.      Breath sounds: Rhonchi present.   Abdominal:      General: Abdomen is flat. Bowel sounds are normal.      Palpations: Abdomen is soft.   Musculoskeletal:         General: Normal range of motion.      Cervical back: Normal range of motion and neck supple.   Skin:     General: Skin is warm.      Capillary Refill: Capillary refill takes less than 2 seconds.   Neurological:      General: No focal deficit present.      Mental Status: He is alert and oriented to person, place, and time. Mental status is at baseline.      Cranial Nerves: No cranial nerve deficit.   Psychiatric:         Mood and Affect: Mood normal.         Behavior: Behavior normal.         Thought Content: Thought content normal.         Judgment: Judgment normal.             Significant Labs: All pertinent labs within the past 24 hours have been reviewed.  Blood Culture: No results for input(s): "LABBLOO" in the last 48 hours.  CBC:   Recent Labs   Lab 06/27/24  0615 06/28/24  0519   WBC 7.76 6.50   HGB 9.8* 10.4*   HCT 30.2* 30.9*    203     CMP:   Recent Labs   Lab 06/27/24  0615 06/28/24  0519   * 131*   K 4.2 4.4   CL 95 95   CO2 25 26   * 211*   BUN 20 21   CREATININE 1.3 1.3   CALCIUM 8.7 8.9   ANIONGAP 10 10       Significant Imaging: I have reviewed all pertinent imaging results/findings within the past 24 hours.  "

## 2024-06-28 NOTE — PT/OT/SLP PROGRESS
Occupational Therapy   Treatment    Name: Percy Ramirez  MRN: [id number]  Admitting Diagnosis:  <principal problem not specified>       Recommendations:     Discharge Recommendations: Moderate Intensity Therapy (patient wants to return home)  Discharge Equipment Recommendations:  bedside commode, shower chair, other (see comments) (gooseneck 5x magnifier (large) with Roland/daylight lamp)  Barriers to discharge:  Decreased caregiver support    Assessment:     Percy Ramirez is a 83 y.o. male with a medical diagnosis of <principal problem not specified>.  He presents with up in chair and on oxygen with no pain noted. Performance deficits affecting function are weakness, impaired endurance, impaired sensation, impaired self care skills, impaired functional mobility, gait instability, impaired balance, visual deficits, decreased lower extremity function, decreased safety awareness, impaired cardiopulmonary response to activity, impaired skin, edema. Patient asked re: set up of tray to assist with eating after exercising today.     Rehab Prognosis:  Good; patient would benefit from acute skilled OT services to address these deficits and reach maximum level of function.       Plan:     Patient to be seen 5 x/week to address the above listed problems via self-care/home management, therapeutic activities, therapeutic exercises  Plan of Care Expires: 07/09/24  Plan of Care Reviewed with: patient    Subjective     Chief Complaint: dizziness   Patient/Family Comments/goals: return home with daughter's help intermittently; son lives at home, but does not help much per patient's report    Pain/Comfort:  Pain Rating 1: 0/10    Objective:     Communicated with: Beryl GARCIA, prior to session.  Patient found up in chair with oxygen, peripheral IV, telemetry upon OT entry to room.    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 vision, can see more to R side)    Orthopedic Precautions:N/A  Braces: N/A  Respiratory Status: Nasal cannula, flow 3 L/min     Occupational Performance:     Bed Mobility:    NT due to wanted to stay in chair to eat lunch      Functional Mobility/Transfers:  Patient completed Sit <> Stand Transfer with minimum assistance  with  rolling walker   Functional Mobility: Patient did not walk because did seated exercises and then lunch arrived     Activities of Daily Living:  Feeding:  stand by assistance /setup with tray with vc given so patient could find items       [facility] 6 Click ADL: 17    Treatment & Education:  Patient demonstrated triceps/biceps with left arm to reduce edema x 3 sets of 10 reps each seated in chair with no SOB noted.   Occupational therapy has recommended large 5x magnifier with Roland/daylight lamp to help see items at home (he has been with [facility] in [facility] previously). He may also need 10x magnifier mirror to help with shaving     Patient left up in chair with all lines intact, call button in reach, and RN  notified    GOALS:   Multidisciplinary Problems       Occupational Therapy Goals          Problem: Occupational Therapy    Goal Priority Disciplines Outcome Interventions   Occupational Therapy Goal     OT, PT/OT Progressing    Description: Goals to be met by: 07/09/24     Patient will increase functional independence with ADLs by performing:    UE Dressing with [place].  LE Dressing with Modified [place].  Grooming while standing at sink with Supervision.  Toileting from toilet with Supervision for hygiene and clothing management.   Sitting in chair x60  minutes with Supervision.  Toilet transfer to toilet with Supervision.  Upper extremity exercise program x10  reps per handout, with supervision.                         Time Tracking:     OT Date of Treatment: 06/28/24  OT Start Time: 1205  OT Stop Time: 1228  OT Total Time  (min): 23 min    Billable Minutes:Self Care/Home Management 15   Therapeutic Exercise 8     OT/MARIO: OT          6/28/2024

## 2024-06-28 NOTE — ASSESSMENT & PLAN NOTE
Patient's FSGs are uncontrolled due to hyperglycemia on current medication regimen.  Last A1c reviewed-   Lab Results   Component Value Date    LABA1C 6.8 10/09/2017    HGBA1C 9.8 (H) 03/18/2024     Most recent fingerstick glucose reviewed-   Recent Labs   Lab 06/27/24  1653   POCTGLUCOSE 347*       Current correctional scale  Medium  Maintain anti-hyperglycemic dose as follows-   Antihyperglycemics (From admission, onward)    Start     Stop Route Frequency Ordered    06/25/24 0715  insulin aspart protamine-insulin aspart (NovoLOG 70/30) injection         -- SubQ 2 times daily with meals 06/25/24 0250        Hold Oral hypoglycemics while patient is in the hospital.  Diet adjusted.

## 2024-06-28 NOTE — ASSESSMENT & PLAN NOTE
Patient with Persistent (7 days or more) atrial fibrillation which is controlled currently with Beta Blocker. Patient is currently in sinus rhythm.[id number] Score: 4. Anticoagulation indicated. Anticoagulation done with Coumadin.  INR in appropriate range.  Continue home dose of Coumadin and monitor closely given use of antibiotics at this time .    -patient on Coumadin, INR 2.5

## 2024-06-28 NOTE — ASSESSMENT & PLAN NOTE
Patient's FSGs are controlled on current medication regimen.  Last A1c reviewed-   Lab Results   Component Value Date    LABA1C 6.8 10/09/2017    HGBA1C 9.8 (H) 03/18/2024     Most recent fingerstick glucose reviewed-   Recent Labs   Lab 06/27/24  1653   POCTGLUCOSE 347*       Current correctional scale  Medium  Maintain anti-hyperglycemic dose as follows-   Antihyperglycemics (From admission, onward)    Start     Stop Route Frequency Ordered    06/25/24 0715  insulin aspart protamine-insulin aspart (NovoLOG 70/30) injection         -- SubQ 2 times daily with meals 06/25/24 0250        Hold Oral hypoglycemics while patient is in the hospital.

## 2024-06-28 NOTE — PROGRESS NOTES
[facility]  Progress Note    Patient Name: Percy Ramirez  MRN: [id number]  Patient Class: IP- Inpatient   Admission Date: 6/24/2024  Length of Stay: 3 days  Attending Physician: Kush Mosquera MD  Primary Care Provider: Kasie Edmondson MD        Subjective:     Principal Problem:<principal problem not specified>        HPI:  83-year-old  male with past medical history significant for non-insulin-dependent type 2 diabetes, essential hypertension, COPD (on 4L  home oxygen)/ILD, h/o CVA,  permanent atrial fibrillation (on Coumadin 10 mg every day except for 7.5 mg on Monday Wednesday Friday), CAD (s/p 2V CABG 2024 and follow with Dr. Melissa[facility]) presents to the ER with fevers and reports a generalized weakness and chills.  States increased cough but always has a cough.  Denies any productive sputum.  States he was short of breath and has dyspnea on exertion worsened normal.  With complaining of abdominal pain but no reports of nausea vomiting or diarrhea.    Workup in the ER for mildly elevated white blood count of 10.  Lactic acid was normal.  Liver enzymes were normal.  Pro count was normal.  His INR was 2.  Urine was not consistent with urinary tract infection.  COVID/FLU negative.     Chest x-ray:  Median sternotomy changes are present.  AP portable chest radiograph demonstrates a cardiac silhouette within normal limits.  Vascular calcifications are seen at the aortic knob.  There is nonspecific prominence of the interstitium.  There is no pneumothorax.  There is no focal consolidation, pneumothorax, or pleural effusion.     CT of the abdomen  1.. No evidence of hydronephrosis/obstructive uropathy bilaterally.  Nonobstructing right nephrolithiasis.  2. Right lower lobe consolidative change superimposed upon a background of diffuse emphysematous change.  Correlation for infectious process advised.  3. Cholelithiasis noting mild distention of the gallbladder.   Further assessment as warranted.  4. Left inguinal hernia containing fat and a portion of the urinary bladder.  5. 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    Abdominal ultrasound:   Cholelithiasis and distended gallbladder.  No definite secondary sonographic evidence to suggest acute cholecystitis at this time noting there is no gallbladder wall thickening or hyperemia and reported negative sonographic Cervantes sign although the patient received pain medication prior to study which can limit assessment.  Clinical correlation and further assessment as warranted.      Due to  His port score is 121 he was started  IV abx Vanc and Zosyn to cover for possible Right lower lobe PNA.      Because this patient's clinical condition is guarded due to his sepsis and requires IVF abx and close monitoring of his respiratory status, care in an alternative location isn't clinically appropriate for the reasons stated above.     We have been consulted for further management and inpatient telemetry admission to hospitalist Medicine Service.           Overview/Hospital Course:  Patient with past medical history significant for non-insulin-dependent type 2 diabetes, essential hypertension, COPD (on 4L home oxygen)/ILD, h/o CVA, permanent atrial fibrillation (on Coumadin 10 mg every day except for 7.5 mg on Monday Wednesday Friday), CAD (s/p 2V CABG 2024 and follow with Dr. Melissa/[facility]) presents to the ER with fevers and reports a generalized weakness and chill ,patient is on IV Abx for URI.consulted ST for possible aspiration,on soft diet.  Consulted PT,OT,recommending SNF,but family wasn't go home with HH.  Fever  is resolved,blood culture no growth.    Interval History:  Patient is seen today for follow-up care.  Patient being treated for  right lung pneumonia.  Patient feels his shortness of breath symptoms are gradually improving.  No chest pain or vomiting.  Patient's blood cultures 1/2 growing gram positive cocci coagulase-negative staph, likely  contaminant.        Review of Systems   Constitutional: Negative.    HENT: Negative.     Eyes: Negative.    Respiratory: Negative.     Cardiovascular: Negative.    Gastrointestinal: Negative.    Endocrine: Negative.    Genitourinary: Negative.    Musculoskeletal: Negative.    Skin: Negative.    Allergic/Immunologic: Negative.    Neurological: Negative.    Hematological: Negative.    Psychiatric/Behavioral: Negative.       Objective:     Vital Signs (Most Recent):  Temp: 97.8 °F (36.6 °C) (06/28/24 0649)  Pulse: 96 (06/28/24 0805)  Resp: 20 (06/28/24 0805)  BP: (!) 146/75 (06/28/24 0649)  SpO2: 95 % (06/28/24 0805) Vital Signs (24h Range):  Temp:  [97.7 °F (36.5 °C)-98.9 °F (37.2 °C)] 97.8 °F (36.6 °C)  Pulse:  [] 96  Resp:  [14-20] 20  SpO2:  [90 %-99 %] 95 %  BP: (124-179)/(58-98) 146/75     Weight: 93 kg (205 lb)  Body mass index is 31.17 kg/m².    Intake/Output Summary (Last 24 hours) at 6/28/2024 1034  Last data filed at 6/28/2024 0948  Gross per 24 hour   Intake 840 ml   Output --   Net 840 ml         Physical Exam  Vitals and nursing note reviewed.   Constitutional:       Appearance: Normal appearance. He is normal weight.   HENT:      Head: Normocephalic and atraumatic.      Right Ear: External ear normal.      Left Ear: External ear normal.      Nose: Nose normal.      Mouth/Throat:      Mouth: Mucous membranes are moist.      Pharynx: Oropharynx is clear.   Eyes:      Extraocular Movements: Extraocular movements intact.      Conjunctiva/sclera: Conjunctivae normal.      Pupils: Pupils are equal, round, and reactive to light.   Cardiovascular:      Rate and Rhythm: Normal rate and regular rhythm.      Pulses: Normal pulses.      Heart sounds: Normal heart sounds.   Pulmonary:      Effort:  "Pulmonary effort is normal.      Breath sounds: Rhonchi present.   Abdominal:      General: Abdomen is flat. Bowel sounds are normal.      Palpations: Abdomen is soft.   Musculoskeletal:         General: Normal range of motion.      Cervical back: Normal range of motion and neck supple.   Skin:     General: Skin is warm.      Capillary Refill: Capillary refill takes less than 2 seconds.   Neurological:      General: No focal deficit present.      Mental Status: He is alert and oriented to person, place, and time. Mental status is at baseline.      Cranial Nerves: No cranial nerve deficit.   Psychiatric:         Mood and Affect: Mood normal.         Behavior: Behavior normal.         Thought Content: Thought content normal.         Judgment: Judgment normal.             Significant Labs: All pertinent labs within the past 24 hours have been reviewed.  Blood Culture: No results for input(s): "LABBLOO" in the last 48 hours.  CBC:   Recent Labs   Lab 06/27/24  0615 06/28/24  0519   WBC 7.76 6.50   HGB 9.8* 10.4*   HCT 30.2* 30.9*    203     CMP:   Recent Labs   Lab 06/27/24  0615 06/28/24  0519   * 131*   K 4.2 4.4   CL 95 95   CO2 25 26   * 211*   BUN 20 21   CREATININE 1.3 1.3   CALCIUM 8.7 8.9   ANIONGAP 10 10       Significant Imaging: I have reviewed all pertinent imaging results/findings within the past 24 hours.    Assessment/Plan:      Advance care planning  Patient was a full code status      Dysphagia  -patient was tolerating diet and medications.  -speech therapy to evaluate treat      Sepsis without acute organ dysfunction  This patient does have evidence of infective focus  My overall impression is sepsis.  Source: Respiratory  Antibiotics given-   Antibiotics (72h ago, onward)      Start     Stop Route Frequency Ordered    06/28/24 1145  cefTRIAXone (Rocephin) 1 g in D5W 100 mL IVPB (MB+)         -- IV Every 24 hours (non-standard times) 06/28/24 1040    06/25/24 1300  doxycycline " "tablet 100 mg         -- Oral Every 12 hours 06/25/24 1147          Latest lactate reviewed-  No results for input(s): "LACTATE", "POCLAC" in the last 72 hours.    No end-organ damage noted and no fluid challenge required due to lactic acid being normal and blood pressure systolic >90/MAP>65.  Source control achieved by:  Continue IV antibiotics for now.  Order sputum culture and follow up on blood cultures.    -we will add cultures 1/4 growing Gram-positive cocci, coagulase-negative staph, likely contaminant.    Pneumonia of right lower lobe due to infectious organism  Continue antibiotics for now wean oxygen to his home levels of 4 L.  Follow up on sputum and blood cultures.  Fever  is resolved,blood culture no growth.    -1/4 blood cultures growing Gram-positive cocci, id and sensitivity pending.  Growing coagulase-negative staph likely Contaminant  -patient on doxycycline, add Rocephin 1 g q.day.  -discharge home with oral antibiotics on discharge    Chronic kidney disease, stage 3a  Creatinine baseline closer to 1.5-1.6.  Currently mildly elevated at 1.9.  Continue to monitor for now especially monitor for bouts of hypotension given sepsis.  Blood pressure medications are resumed but with hold parameters for low normal blood pressure.  Follow up on repeat labs.    -trend renal function, creatinine down to 1.3  -avoid NSAIDs and nephrotoxic agents    History of stroke  -continue Lipitor, patient on Coumadin for history of atrial fibrillation      ILD (interstitial lung disease)  On 4 liter home oxygen.  -follow up in outpatient pulmonary clinic      Persistent atrial fibrillation  Patient with Persistent (7 days or more) atrial fibrillation which is controlled currently with Beta Blocker. Patient is currently in sinus rhythm.[id number] Score: 4. Anticoagulation indicated. Anticoagulation done with Coumadin.  INR in appropriate range.  Continue home dose of Coumadin and monitor closely given use of antibiotics at " this time .    -patient on Coumadin, INR 2.5    COPD with acute exacerbation  Continue home oxygen.  Scheduled and p.r.n. DuoNebs ordered.  We will hold off on IV steroids at this time given uncontrolled diabetes as well as sepsis/pneumonic process. Treat pneumonia.    Type 2 diabetes, with peripheral circulatory disorder not at goal  Patient's FSGs are controlled on current medication regimen.  Last A1c reviewed-   Lab Results   Component Value Date    LABA1C 6.8 10/09/2017    HGBA1C 9.8 (H) 03/18/2024     Most recent fingerstick glucose reviewed-   Recent Labs   Lab 06/27/24  1653   POCTGLUCOSE 347*       Current correctional scale  Medium  Maintain anti-hyperglycemic dose as follows-   Antihyperglycemics (From admission, onward)      Start     Stop Route Frequency Ordered    06/25/24 0715  insulin aspart protamine-insulin aspart (NovoLOG 70/30) injection         -- SubQ 2 times daily with meals 06/25/24 0250          Hold Oral hypoglycemics while patient is in the hospital.    Poorly controlled type 2 diabetes mellitus with neuropathy  Patient's FSGs are uncontrolled due to hyperglycemia on current medication regimen.  Last A1c reviewed-   Lab Results   Component Value Date    LABA1C 6.8 10/09/2017    HGBA1C 9.8 (H) 03/18/2024     Most recent fingerstick glucose reviewed-   Recent Labs   Lab 06/27/24  1653   POCTGLUCOSE 347*       Current correctional scale  Medium  Maintain anti-hyperglycemic dose as follows-   Antihyperglycemics (From admission, onward)      Start     Stop Route Frequency Ordered    06/25/24 0715  insulin aspart protamine-insulin aspart (NovoLOG 70/30) injection         -- SubQ 2 times daily with meals 06/25/24 0250          Hold Oral hypoglycemics while patient is in the hospital.  Diet adjusted.    Macular degeneration  Follow up in clinic      Coronary artery disease  No complaints of chest pain at this time.  Continue home medications.  Monitor on telemetry.  Support blood pressure and  heart rate.      VTE Risk Mitigation (From admission, onward)           Ordered     warfarin tablet 7.5 mg  Every Mon, Wed, Fri         06/25/24 0654     warfarin (COUMADIN) tablet 10 mg  Every Tues, Thurs, Sat, Sun         06/25/24 0654                    Discharge Planning   TATI: 6/29/2024     Code Status: Full Code   Is the patient medically ready for discharge?:     Reason for patient still in hospital (select all that apply): Patient trending condition, Treatment, and Pending disposition  Discharge Plan A: Home Health, NP at home                  Kush Mosquera MD  Department of Hospital Medicine   [facility]

## 2024-06-28 NOTE — NURSING
[facility] MEWS TRIGGER FOLLOW UP       MEWS Monitoring, Score is:  Indication for review: O2 Device Spo2 90%    Bedside Nurse, Annabella, RN contacted, no concerns verbalized at this time, instructed to call [phone] for further concerns or assistance..

## 2024-06-28 NOTE — NURSING
[facility]  Nurses Note -- 4 Eyes        Date: 06/28/2024        Skin assessed on: Q shift        [x] No Pressure Injuries Present                 []Prevention Measures Documented     [] Yes LDA Previously documented      [] Yes New Pressure Injury Discovered              [] LDA Added        Attending RN: RADHA Cordoba     Second RN:  Annabella RN

## 2024-06-28 NOTE — ASSESSMENT & PLAN NOTE
"This patient does have evidence of infective focus  My overall impression is sepsis.  Source: Respiratory  Antibiotics given-   Antibiotics (72h ago, onward)      Start     Stop Route Frequency Ordered    06/28/24 1145  cefTRIAXone (Rocephin) 1 g in D5W 100 mL IVPB (MB+)         -- IV Every 24 hours (non-standard times) 06/28/24 1040    06/25/24 1300  doxycycline tablet 100 mg         -- Oral Every 12 hours 06/25/24 1147          Latest lactate reviewed-  No results for input(s): "LACTATE", "POCLAC" in the last 72 hours.    No end-organ damage noted and no fluid challenge required due to lactic acid being normal and blood pressure systolic >90/MAP>65.  Source control achieved by:  Continue IV antibiotics for now.  Order sputum culture and follow up on blood cultures.    -we will add cultures 1/4 growing Gram-positive cocci, coagulase-negative staph, likely contaminant.  "

## 2024-06-28 NOTE — PT/OT/SLP PROGRESS
Physical Therapy Treatment    Patient Name:  Percy Ramirez   MRN:  [id number]    Recommendations:     Discharge Recommendations: Moderate Intensity Therapy  Discharge Equipment Recommendations: none  Barriers to discharge: None    Assessment:     Percy Ramirez is a 83 y.o. male admitted with a medical diagnosis of <principal problem not specified>.  He presents with the following impairments/functional limitations: weakness, impaired endurance, impaired functional mobility, gait instability, impaired balance, decreased coordination, decreased upper extremity function, decreased lower extremity function, decreased safety awareness, impaired skin, edema, impaired cardiopulmonary response to activity.    Rehab Prognosis: Good; patient would benefit from acute skilled PT services to address these deficits and reach maximum level of function.    Recent Surgery: * No surgery found *      Plan:     During this hospitalization, patient to be seen 5 x/week to address the identified rehab impairments via gait training, therapeutic activities, therapeutic exercises and progress toward the following goals:    Plan of Care Expires:  07/02/24    Subjective     Chief Complaint: wanted to pee  Patient/Family Comments/goals: pt agreed to participate.   Pain/Comfort:  Pain Rating 1: 0/10  Pain Rating Post-Intervention 1: 0/10      Objective:     Communicated with nurse [place] prior to session.  Patient found HOB elevated with oxygen, peripheral IV, telemetry upon PT entry to room.     General Precautions: Standard, fall  Orthopedic Precautions: N/A  Braces: N/A  Respiratory Status: Nasal cannula, flow 3 L/min     Functional Mobility:  Bed Mobility:     Rolling Left/Right: using BR with stand by assistance  Scooting: anterior scoot to EOB with stand by assistance  Supine to Sit: contact guard assistance, HOB elevated  Transfers:   gait belt donned prior standing  Sit to Stand:  2 trials, from EOB with and  from BSC with CGA with  rolling walker  Bed to BSC: CGA/Min A with rolling walker using Step Transfer  Gait: Pt ambulated ~4ft and ~12ft with CGA/Min A using RW on 4 L/min Oxygen NC. Pt with decreased keesha/step length, flexed posture, min unsteadiness but no LOB; v/t cueing for , AD management, upright posture, PLB tech. Limited ambulation 2* fatigue, dizziness (causing from his L&R eyes), and SOB; SpO2 91%  Balance: Fair/Fair + Sitting; Fair Standing    AM-PAC 6 CLICK MOBILITY  Turning over in bed (including adjusting bedclothes, sheets and blankets)?: 3  Sitting down on and standing up from a chair with arms (e.g., wheelchair, bedside commode, etc.): 3  Moving from lying on back to sitting on the side of the bed?: 3  Moving to and from a bed to a chair (including a wheelchair)?: 3  Need to walk in hospital room?: 3  Climbing 3-5 steps with a railing?: 1  Basic Mobility Total Score: 16       Treatment & Education:  Re-educated pt on PLB tech, benefits of OOB activity with hospital staff assistance and performing BLE ex throughout the day, pt verbalized understanding.     Pt found soiled. Pt completed R/L rolling in supine for danielle hygiene and new brief donned prior OOB activity.     Nurse notified to place a Mepilex on pt's sacrum area 2* redness post treatment.     Patient left up in chair on cushion seat with BLE offloaded by pillow, tray table near by, Oxygen, all lines intact, call button in reach, nurse notified, and family present. Pt room door left open and close to nursing station.      GOALS:   Multidisciplinary Problems       Physical Therapy Goals          Problem: Physical Therapy    Goal Priority Disciplines Outcome Goal Variances Interventions   Physical Therapy Goal     PT, PT/OT Progressing     Description: Goals to be met by: 24     Patient will increase functional independence with mobility by performin. Pt to be SBA with bed mobility.  2. Pt to transfer with CGA.  3. Pt to ambulate 50' w/RW  CGA.  4. Pt to be (I) with HEP.                         Time Tracking:     PT Received On: 06/28/24  PT Start Time: 0946     PT Stop Time: 1024  PT Total Time (min): 38 min     Billable Minutes: Gait Training 15 min and Therapeutic Activity 23 min    Treatment Type: Treatment  PT/PTA: PTA     Number of PTA visits since last PT visit: 2     06/28/2024

## 2024-06-28 NOTE — NURSING
[facility] [place] 4 [place]      6/27/2024       Skin assessed on: Q Shift      [x] No Pressure Injuries Present    []Prevention Measures Documented    [] Yes LDA  for Pressure Injury Previously documented     [] Yes New Pressure Injury Discovered   [] LDA for New Pressure Injury Added      Attending RN:  Annabella Erickson RN     Second RN:  Trever Stratton RN

## 2024-06-28 NOTE — PLAN OF CARE
Problem: Adult Inpatient Plan of Care  Goal: Plan of Care Review  Outcome: Progressing  Goal: Patient-Specific Goal (Individualized)  Outcome: Progressing  Goal: Optimal Comfort and Wellbeing  Outcome: Progressing  Goal: Readiness for Transition of Care  Outcome: Progressing     Problem: Fall Injury Risk  Goal: Absence of Fall and Fall-Related Injury  Outcome: Progressing     Problem: Sepsis/Septic Shock  Goal: Optimal Coping  Outcome: Progressing     Problem: Pneumonia  Goal: Fluid Balance  Outcome: Progressing

## 2024-06-28 NOTE — ASSESSMENT & PLAN NOTE
Continue antibiotics for now wean oxygen to his home levels of 4 L.  Follow up on sputum and blood cultures.  Fever  is resolved,blood culture no growth.    -1/4 blood cultures growing Gram-positive cocci, id and sensitivity pending.  Growing coagulase-negative staph likely Contaminant  -patient on doxycycline, add Rocephin 1 g q.day.  -discharge home with oral antibiotics on discharge

## 2024-06-29 PROBLEM — A41.9 SEPSIS WITHOUT ACUTE ORGAN DYSFUNCTION: Status: RESOLVED | Noted: 2024-06-25 | Resolved: 2024-06-29

## 2024-06-29 LAB
ANION GAP SERPL CALC-SCNC: 10 MMOL/L (ref 8–16)
ASCENDING AORTA: 3.64 CM
AV INDEX (PROSTH): 0.83
AV MEAN GRADIENT: 3 MMHG
AV PEAK GRADIENT: 4 MMHG
AV VALVE AREA BY VELOCITY RATIO: 2.84 CM²
AV VALVE AREA: 3.12 CM²
AV VELOCITY RATIO: 0.75
BACTERIA BLD CULT: ABNORMAL
BACTERIA BLD CULT: NORMAL
BASOPHILS # BLD AUTO: 0.04 K/UL (ref 0–0.2)
BASOPHILS NFR BLD: 0.6 % (ref 0–1.9)
BSA FOR ECHO PROCEDURE: 2.11 M2
BUN SERPL-MCNC: 19 MG/DL (ref 8–23)
CALCIUM SERPL-MCNC: 8.8 MG/DL (ref 8.7–10.5)
CHLORIDE SERPL-SCNC: 92 MMOL/L (ref 95–110)
CO2 SERPL-SCNC: 27 MMOL/L (ref 23–29)
CREAT SERPL-MCNC: 1.4 MG/DL (ref 0.5–1.4)
CV ECHO LV RWT: 0.45 CM
DIFFERENTIAL METHOD BLD: ABNORMAL
DOP CALC AO PEAK VEL: 1.06 M/S
DOP CALC AO VTI: 17.6 CM
DOP CALC LVOT AREA: 3.8 CM2
DOP CALC LVOT DIAMETER: 2.19 CM
DOP CALC LVOT PEAK VEL: 0.8 M/S
DOP CALC LVOT STROKE VOLUME: 54.97 CM3
DOP CALCLVOT PEAK VEL VTI: 14.6 CM
E WAVE DECELERATION TIME: 73.56 MSEC
E/A RATIO: 0.32
E/E' RATIO: 4.77 M/S
ECHO LV POSTERIOR WALL: 1.11 CM (ref 0.6–1.1)
EOSINOPHIL # BLD AUTO: 0.4 K/UL (ref 0–0.5)
EOSINOPHIL NFR BLD: 6.3 % (ref 0–8)
ERYTHROCYTE [DISTWIDTH] IN BLOOD BY AUTOMATED COUNT: 14.1 % (ref 11.5–14.5)
EST. GFR  (NO RACE VARIABLE): 50 ML/MIN/1.73 M^2
FRACTIONAL SHORTENING: 14 % (ref 28–44)
GLUCOSE SERPL-MCNC: 282 MG/DL (ref 70–110)
HCT VFR BLD AUTO: 31.2 % (ref 40–54)
HGB BLD-MCNC: 10 G/DL (ref 14–18)
IMM GRANULOCYTES # BLD AUTO: 0.11 K/UL (ref 0–0.04)
IMM GRANULOCYTES NFR BLD AUTO: 1.7 % (ref 0–0.5)
INR PPP: 3.3 (ref 0.8–1.2)
INTERVENTRICULAR SEPTUM: 1.05 CM (ref 0.6–1.1)
IVC DIAMETER: 1.67 CM
IVRT: 76.12 MSEC
LA MAJOR: 6.5 CM
LA MINOR: 6.25 CM
LA WIDTH: 3.7 CM
LEFT ATRIUM SIZE: 4.6 CM
LEFT ATRIUM VOLUME INDEX: 44.5 ML/M2
LEFT ATRIUM VOLUME: 92.19 CM3
LEFT INTERNAL DIMENSION IN SYSTOLE: 4.27 CM (ref 2.1–4)
LEFT VENTRICLE DIASTOLIC VOLUME INDEX: 56.71 ML/M2
LEFT VENTRICLE DIASTOLIC VOLUME: 117.38 ML
LEFT VENTRICLE MASS INDEX: 97 G/M2
LEFT VENTRICLE SYSTOLIC VOLUME INDEX: 39.4 ML/M2
LEFT VENTRICLE SYSTOLIC VOLUME: 81.62 ML
LEFT VENTRICULAR INTERNAL DIMENSION IN DIASTOLE: 4.98 CM (ref 3.5–6)
LEFT VENTRICULAR MASS: 200.69 G
LV LATERAL E/E' RATIO: 3.88 M/S
LV SEPTAL E/E' RATIO: 6.2 M/S
LVED V (TEICH): 117.38 ML
LVES V (TEICH): 81.62 ML
LVOT MG: 1.79 MMHG
LVOT MV: 0.65 CM/S
LYMPHOCYTES # BLD AUTO: 0.6 K/UL (ref 1–4.8)
LYMPHOCYTES NFR BLD: 8.6 % (ref 18–48)
MCH RBC QN AUTO: 27.8 PG (ref 27–31)
MCHC RBC AUTO-ENTMCNC: 32.1 G/DL (ref 32–36)
MCV RBC AUTO: 87 FL (ref 82–98)
MONOCYTES # BLD AUTO: 1 K/UL (ref 0.3–1)
MONOCYTES NFR BLD: 15.5 % (ref 4–15)
MV PEAK A VEL: 0.97 M/S
MV PEAK E VEL: 0.31 M/S
MV STENOSIS PRESSURE HALF TIME: 21.33 MS
MV VALVE AREA P 1/2 METHOD: 10.31 CM2
NEUTROPHILS # BLD AUTO: 4.3 K/UL (ref 1.8–7.7)
NEUTROPHILS NFR BLD: 67.3 % (ref 38–73)
NRBC BLD-RTO: 0 /100 WBC
OHS CV RV/LV RATIO: 0.93 CM
PISA TR MAX VEL: 3.27 M/S
PLATELET # BLD AUTO: 198 K/UL (ref 150–450)
PMV BLD AUTO: 9.7 FL (ref 9.2–12.9)
POCT GLUCOSE: 294 MG/DL (ref 70–110)
POTASSIUM SERPL-SCNC: 4.2 MMOL/L (ref 3.5–5.1)
PROTHROMBIN TIME: 34 SEC (ref 9–12.5)
PV PEAK GRADIENT: 3 MMHG
PV PEAK VELOCITY: 0.88 M/S
RA MAJOR: 5.6 CM
RA PRESSURE ESTIMATED: 3 MMHG
RA WIDTH: 4.4 CM
RBC # BLD AUTO: 3.6 M/UL (ref 4.6–6.2)
RIGHT VENTRICLE DIASTOLIC BASEL DIMENSION: 4.6 CM
RIGHT VENTRICULAR END-DIASTOLIC DIMENSION: 4.65 CM
RV TB RVSP: 6 MMHG
RV TISSUE DOPPLER FREE WALL SYSTOLIC VELOCITY 1 (APICAL 4 CHAMBER VIEW): 5.77 CM/S
SINUS: 4.39 CM
SODIUM SERPL-SCNC: 129 MMOL/L (ref 136–145)
STJ: 3.3 CM
TDI LATERAL: 0.08 M/S
TDI SEPTAL: 0.05 M/S
TDI: 0.07 M/S
TR MAX PG: 43 MMHG
TRICUSPID ANNULAR PLANE SYSTOLIC EXCURSION: 1.19 CM
TV PEAK GRADIENT: 4 MMHG
TV REST PULMONARY ARTERY PRESSURE: 46 MMHG
WBC # BLD AUTO: 6.39 K/UL (ref 3.9–12.7)
Z-SCORE OF LEFT VENTRICULAR DIMENSION IN END DIASTOLE: -2.34
Z-SCORE OF LEFT VENTRICULAR DIMENSION IN END SYSTOLE: 0.82

## 2024-06-29 PROCEDURE — 80048 BASIC METABOLIC PNL TOTAL CA: CPT | Mod: 91

## 2024-06-29 PROCEDURE — 80048 BASIC METABOLIC PNL TOTAL CA: CPT | Performed by: HOSPITALIST

## 2024-06-29 PROCEDURE — 85025 COMPLETE CBC W/AUTO DIFF WBC: CPT | Performed by: HOSPITALIST

## 2024-06-29 PROCEDURE — 36415 COLL VENOUS BLD VENIPUNCTURE: CPT

## 2024-06-29 PROCEDURE — 25000003 PHARM REV CODE 250: Performed by: HOSPITALIST

## 2024-06-29 PROCEDURE — 85610 PROTHROMBIN TIME: CPT | Performed by: HOSPITALIST

## 2024-06-29 PROCEDURE — 27000221 HC OXYGEN, UP TO 24 HOURS

## 2024-06-29 PROCEDURE — 25000003 PHARM REV CODE 250: Performed by: INTERNAL MEDICINE

## 2024-06-29 PROCEDURE — 94761 N-INVAS EAR/PLS OXIMETRY MLT: CPT

## 2024-06-29 PROCEDURE — 94660 CPAP INITIATION&MGMT: CPT

## 2024-06-29 PROCEDURE — 83735 ASSAY OF MAGNESIUM: CPT

## 2024-06-29 PROCEDURE — 25000242 PHARM REV CODE 250 ALT 637 W/ HCPCS: Performed by: INTERNAL MEDICINE

## 2024-06-29 PROCEDURE — 94640 AIRWAY INHALATION TREATMENT: CPT

## 2024-06-29 PROCEDURE — 21400001 HC TELEMETRY ROOM

## 2024-06-29 PROCEDURE — 63600175 PHARM REV CODE 636 W HCPCS: Performed by: INTERNAL MEDICINE

## 2024-06-29 PROCEDURE — 99900035 HC TECH TIME PER 15 MIN (STAT)

## 2024-06-29 PROCEDURE — 36415 COLL VENOUS BLD VENIPUNCTURE: CPT | Performed by: HOSPITALIST

## 2024-06-29 RX ORDER — OMEPRAZOLE 20 MG/1
20 CAPSULE, DELAYED RELEASE ORAL DAILY
Qty: 30 CAPSULE | Refills: 2 | Status: SHIPPED | OUTPATIENT
Start: 2024-06-29 | End: 2024-09-27

## 2024-06-29 RX ORDER — CEFDINIR 300 MG/1
300 CAPSULE ORAL 2 TIMES DAILY
Qty: 14 CAPSULE | Refills: 0 | Status: SHIPPED | OUTPATIENT
Start: 2024-06-29 | End: 2024-07-06

## 2024-06-29 RX ORDER — CARVEDILOL 3.12 MG/1
3.12 TABLET ORAL 2 TIMES DAILY WITH MEALS
Qty: 60 TABLET | Refills: 2 | Status: SHIPPED | OUTPATIENT
Start: 2024-06-29 | End: 2024-09-27

## 2024-06-29 RX ORDER — VALSARTAN 80 MG/1
40 TABLET ORAL DAILY
Start: 2024-06-29

## 2024-06-29 RX ORDER — DOXYCYCLINE HYCLATE 100 MG
100 TABLET ORAL EVERY 12 HOURS
Qty: 14 TABLET | Refills: 0 | Status: SHIPPED | OUTPATIENT
Start: 2024-06-29 | End: 2024-07-06

## 2024-06-29 RX ORDER — SPIRONOLACTONE 25 MG/1
12.5 TABLET ORAL DAILY
Start: 2024-06-29

## 2024-06-29 RX ADMIN — INSULIN ASPART 15 UNITS: 100 INJECTION, SUSPENSION SUBCUTANEOUS at 08:06

## 2024-06-29 RX ADMIN — DOXYCYCLINE HYCLATE 100 MG: 100 TABLET, COATED ORAL at 08:06

## 2024-06-29 RX ADMIN — ATORVASTATIN CALCIUM 40 MG: 40 TABLET, FILM COATED ORAL at 08:06

## 2024-06-29 RX ADMIN — IPRATROPIUM BROMIDE AND ALBUTEROL SULFATE 3 ML: 2.5; .5 SOLUTION RESPIRATORY (INHALATION) at 01:06

## 2024-06-29 RX ADMIN — DULOXETINE HYDROCHLORIDE 60 MG: 30 CAPSULE, DELAYED RELEASE ORAL at 08:06

## 2024-06-29 RX ADMIN — PANTOPRAZOLE SODIUM 40 MG: 40 GRANULE, DELAYED RELEASE ORAL at 08:06

## 2024-06-29 RX ADMIN — INSULIN ASPART 10 UNITS: 100 INJECTION, SUSPENSION SUBCUTANEOUS at 06:06

## 2024-06-29 RX ADMIN — IPRATROPIUM BROMIDE AND ALBUTEROL SULFATE 3 ML: 2.5; .5 SOLUTION RESPIRATORY (INHALATION) at 07:06

## 2024-06-29 RX ADMIN — CEFTRIAXONE 1 G: 1 INJECTION, POWDER, FOR SOLUTION INTRAMUSCULAR; INTRAVENOUS at 12:06

## 2024-06-29 RX ADMIN — GABAPENTIN 900 MG: 300 CAPSULE ORAL at 08:06

## 2024-06-29 RX ADMIN — GUAIFENESIN 200 MG: 200 SOLUTION ORAL at 12:06

## 2024-06-29 RX ADMIN — FLUTICASONE PROPIONATE 50 MCG: 50 SPRAY, METERED NASAL at 08:06

## 2024-06-29 RX ADMIN — FUROSEMIDE 40 MG: 40 TABLET ORAL at 08:06

## 2024-06-29 NOTE — ASSESSMENT & PLAN NOTE
"Patient's FSGs are uncontrolled due to hyperglycemia on current medication regimen.  Last A1c reviewed-   Lab Results   Component Value Date    LABA1C 6.8 10/09/2017    HGBA1C 9.8 (H) 03/18/2024     Most recent fingerstick glucose reviewed-   No results for input(s): "POCTGLUCOSE" in the last 24 hours.    Current correctional scale  Medium  Maintain anti-hyperglycemic dose as follows-   Antihyperglycemics (From admission, onward)    Start     Stop Route Frequency Ordered    06/29/24 0715  insulin aspart protamine-insulin aspart (NovoLOG 70/30) injection        Placed in "And" Linked Group    -- SubQ with breakfast 06/28/24 1611    06/28/24 1715  insulin aspart protamine-insulin aspart (NovoLOG 70/30) injection        Placed in "And" Linked Group    -- SubQ with dinner 06/28/24 1611        Hold Oral hypoglycemics while patient is in the hospital.  Diet adjusted.  "

## 2024-06-29 NOTE — ASSESSMENT & PLAN NOTE
Patient with Persistent (7 days or more) atrial fibrillation which is controlled currently with Beta Blocker. Patient is currently in sinus rhythm.[id number] Score: 4. Anticoagulation indicated. Anticoagulation done with Coumadin.  INR in appropriate range.  Continue home dose of Coumadin and monitor closely given use of antibiotics at this time .    -patient on Coumadin, INR 2.5<3.4>3.3  -echo ordered to assess LV function  -continue medications for CHF, dose reduced due to low blood pressure.  -follow up with cardiology clinic in 2 weeks or as scheduled.

## 2024-06-29 NOTE — ASSESSMENT & PLAN NOTE
"This patient does have evidence of infective focus  My overall impression is sepsis.  Source: Respiratory  Antibiotics given-   Antibiotics (72h ago, onward)      Start     Stop Route Frequency Ordered    06/28/24 1145  cefTRIAXone (Rocephin) 1 g in D5W 100 mL IVPB (MB+)         -- IV Every 24 hours (non-standard times) 06/28/24 1040    06/25/24 1300  doxycycline tablet 100 mg         -- Oral Every 12 hours 06/25/24 1147          Latest lactate reviewed-  No results for input(s): "LACTATE", "POCLAC" in the last 72 hours.    No end-organ damage noted and no fluid challenge required due to lactic acid being normal and blood pressure systolic >90/MAP>65.  Source control achieved by:  Continue IV antibiotics for now.  Order sputum culture and follow up on blood cultures.    -we will add cultures 1/4 growing Gram-positive cocci, coagulase-negative staph, likely contaminant.  -discharge home with oral antibiotics, cefdinir 300 mg p.o. b.i.d. for 7 days, doxycycline 100 mg p.o. b.i.d. 7 days.  "

## 2024-06-29 NOTE — ASSESSMENT & PLAN NOTE
"Patient's FSGs are controlled on current medication regimen.  Last A1c reviewed-   Lab Results   Component Value Date    LABA1C 6.8 10/09/2017    HGBA1C 9.8 (H) 03/18/2024     Most recent fingerstick glucose reviewed-   No results for input(s): "POCTGLUCOSE" in the last 24 hours.    Current correctional scale  Medium  Maintain anti-hyperglycemic dose as follows-   Antihyperglycemics (From admission, onward)    Start     Stop Route Frequency Ordered    06/29/24 0715  insulin aspart protamine-insulin aspart (NovoLOG 70/30) injection        Placed in "And" Linked Group    -- SubQ with breakfast 06/28/24 1611    06/28/24 1715  insulin aspart protamine-insulin aspart (NovoLOG 70/30) injection        Placed in "And" Linked Group    -- SubQ with dinner 06/28/24 1611        Hold Oral hypoglycemics while patient is in the hospital.  "

## 2024-06-29 NOTE — ASSESSMENT & PLAN NOTE
Continue antibiotics for now wean oxygen to his home levels of 4 L.  Follow up on sputum and blood cultures.  Fever  is resolved,blood culture no growth.    -1/4 blood cultures growing Gram-positive cocci, id and sensitivity pending.  Growing coagulase-negative staph likely Contaminant  -patient on doxycycline, add Rocephin 1 g q.day.  -discharge home with oral antibiotics , cefdinir 300 mg p.o. b.i.d. for 7 days, doxycycline 100 mg p.o. b.i.d. for 7 days.

## 2024-06-29 NOTE — DISCHARGE SUMMARY
[facility]  Discharge Summary      Patient Name: Percy Ramirez  MRN: [id number]  [facility]: [id number]  Patient Class: IP- Inpatient  Admission Date: 6/24/2024  Hospital Length of Stay: 5 days  Discharge Date and Time:  06/30/2024 9:29 AM  Attending Physician: Kush Mosquera MD   Discharging Provider: Kush Mosquera MD  Primary Care Provider: Kasie Edmondson MD    Primary Care Team: Networked reference to record PCT     HPI:   83-year-old  male with past medical history significant for non-insulin-dependent type 2 diabetes, essential hypertension, COPD (on 4L  home oxygen)/ILD, h/o CVA,  permanent atrial fibrillation (on Coumadin 10 mg every day except for 7.5 mg on Monday Wednesday Friday), CAD (s/p 2V CABG 2024 and follow with Dr. Melissa/[facility]) presents to the ER with fevers and reports a generalized weakness and chills.  States increased cough but always has a cough.  Denies any productive sputum.  States he was short of breath and has dyspnea on exertion worsened normal.  With complaining of abdominal pain but no reports of nausea vomiting or diarrhea.    Workup in the ER for mildly elevated white blood count of 10.  Lactic acid was normal.  Liver enzymes were normal.  Pro count was normal.  His INR was 2.  Urine was not consistent with urinary tract infection.  COVID/FLU negative.     Chest x-ray:  Median sternotomy changes are present.  AP portable chest radiograph demonstrates a cardiac silhouette within normal limits.  Vascular calcifications are seen at the aortic knob.  There is nonspecific prominence of the interstitium.  There is no pneumothorax.  There is no focal consolidation, pneumothorax, or pleural effusion.     CT of the abdomen  1.. No evidence of hydronephrosis/obstructive uropathy bilaterally.  Nonobstructing right nephrolithiasis.  2. Right lower lobe consolidative change superimposed upon a background of diffuse emphysematous change.   Correlation for infectious process advised.  3. Cholelithiasis noting mild distention of the gallbladder.  Further assessment as warranted.  4. Left inguinal hernia containing fat and a portion of the urinary bladder.  5. 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    Abdominal ultrasound:   Cholelithiasis and distended gallbladder.  No definite secondary sonographic evidence to suggest acute cholecystitis at this time noting there is no gallbladder wall thickening or hyperemia and reported negative sonographic Cervantes sign although the patient received pain medication prior to study which can limit assessment.  Clinical correlation and further assessment as warranted.      Due to  His port score is 121 he was started  IV abx Vanc and Zosyn to cover for possible Right lower lobe PNA.      Because this patient's clinical condition is guarded due to his sepsis and requires IVF abx and close monitoring of his respiratory status, care in an alternative location isn't clinically appropriate for the reasons stated above.     We have been consulted for further management and inpatient telemetry admission to hospitalist Medicine Service.           * No surgery found *      Hospital Course:   Patient with past medical history significant for non-insulin-dependent type 2 diabetes, essential hypertension, COPD (on 4L home oxygen)/ILD, h/o CVA, permanent atrial fibrillation (on Coumadin 10 mg every day except for 7.5 mg on Monday Wednesday Friday), CAD (s/p 2V CABG 2024 and follow with Dr. Melissa/[facility]) presents to the ER with fevers and reports a generalized weakness and chill ,patient is on IV Abx for URI.consulted ST for possible aspiration,on soft diet.  Consulted PT,OT,recommending SNF,but family wasn't go home with HH.  Fever  is  resolved,blood culture no growth.  Patient improved shortness of breath, cough, congestion for right lung pneumonia.  Patient was treated with IV Rocephin and doxycycline p.o..  Patient's blood cultures show 1/2 growing Gram-positive cocci coagulase-negative staph, likely contaminant.  Patient to be discharged home after echo done to assess LV function due to history of chronic CHF.  Patient will be discharged on cefdinir 300 mg p.o. b.i.d. for 7 days, doxycycline 100 mg p.o. b.i.d. for 7 days.  Patient's CHF medications GDM T doses were decreased due to concerns of low normal blood pressure.  Coreg decreased to 3.125 mg p.o. b.i.d., spironolactone 12.5 mg daily, valsartan 40 mg daily.  Follow up with PCP in 1 week.  Cardiology clinic in 2 weeks or as scheduled.  The patient if he develops chest pains, shortness for breath, nausea, vomiting, abdominal pain type symptoms he should go to the local ER.  Patient to be discharged home with home health care with palliative care.  Patient verbalized understanding.   assist with discharge planning, input appreciated.     Patient was laboratories showed creatinine 1.3, BUN 19, sodium 127, but corrected sodium 130.  Patient will follow with PCP 1 week.  Give repeat BMP in 1 week.    Goals of Care Treatment Preferences:  Code Status: Full Code    Living Will: Yes              Consults:   Consults (From admission, onward)          Status Ordering Provider     Inpatient consult to Palliative Care  Once        Provider:  Marci Dominguez MD    Completed ANALIA ALCANTARA     Inpatient consult to Spiritual Care  Once        Provider:  (Not yet assigned)    Completed ANALIA ALCANTARA            No new Assessment & Plan notes have been filed under this hospital service since the last note was generated.  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, E11.65] 07/29/2016 Yes    Type 2 diabetes, with peripheral circulatory disorder not at goal [E11.51] 07/29/2016 Yes    Coronary artery disease [I25.10]  Yes    Macular degeneration [H35.30]  Yes      Problems Resolved During this Admission:    Diagnosis Date Noted Date Resolved POA    PRINCIPAL PROBLEM:  Sepsis without acute organ dysfunction [A41.9] 06/25/2024 06/29/2024 Yes    COPD with acute exacerbation [J44.1] 07/29/2016 06/29/2024 Yes       Discharged Condition: fair    Disposition: [place]-Health Care [facility] [place]    Follow Up:   Follow-up Information       Kasie Edmondson MD Follow up in 1 week(s).    Specialties: Internal Medicine, Pediatrics  Contact information:  [address] [place] [id number] [phone]               [facility] Follow [facility]    Specialties: Home Health Services, Home Therapy Services, Home Living Aide Services  Why: Home Health- you will be contacted to schedule day and time to come to home to begin services  Contact information:  [address], [address]  [facility] [address]  [phone]             [place] - Follow up.    Specialty: Hospice Services  Why: Hospice/Palliative care  Contact information:  [address]  [address]  [facility] [id number] [phone]                           Patient Instructions:      BASIC METABOLIC PANEL   Standing Status: Future Standing Exp. Date: 09/28/25     Ambulatory referral/consult to HOME Palliative Care   Standing Status: Future   Referral Priority: Routine Referral Type:  Consultation   Requested Specialty: Hospice and Palliative Medicine   Number of Visits Requested: 1       Significant Diagnostic Studies: Labs: CMP   Recent Labs   Lab 06/29/24  0431 06/29/24  2324   * 127*   K 4.2 4.0   CL 92* 90*   CO2 27 27   * 307*   BUN 19 19   CREATININE 1.4 1.3   CALCIUM 8.8 8.8   ANIONGAP 10 10   , CBC   Recent Labs   Lab 06/29/24  0431   WBC 6.39   HGB 10.0*   HCT 31.2*      , Troponin   Recent Labs   Lab 06/24/24  2337   TROPONINI 0.010   , and A1C:   Recent Labs   Lab 03/18/24  0000   HGBA1C 9.8*       Pending Diagnostic Studies:       None           Medications:  Reconciled Home Medications:      Medication List        START taking these medications      cefdinir 300 MG capsule  Commonly known as: OMNICEF  Take 1 capsule (300 mg total) by mouth 2 (two) times daily. for 7 days     doxycycline 100 MG tablet  Commonly known as: VIBRA-TABS  Take 1 tablet (100 mg total) by mouth every 12 (twelve) hours. for 7 days     omeprazole 20 MG capsule  Commonly known as: PRILOSEC  Take 1 capsule (20 mg total) by mouth once daily.            CHANGE how you take these medications      carvediloL 3.125 MG tablet  Commonly known as: COREG  Take 1 tablet (3.125 mg total) by mouth 2 (two) times daily with meals.  What changed:   medication strength  how much to take  when to take this     HYDROcodone-acetaminophen 7.5-325 mg per tablet  Commonly known as: NORCO  Take 1 tablet by mouth every 8 (eight) hours as needed for Pain.  What changed: Another medication with the same name was removed. Continue taking this medication, and follow the directions you see here.     spironolactone 25 MG tablet  Commonly known as: ALDACTONE  Take 0.5 tablets (12.5 mg total) by mouth once daily.  What changed:   how much to take  when to take this     * valsartan 80 MG tablet  Commonly known as: DIOVAN  Take 0.5 tablets (40 mg total) by mouth once daily.  What changed:   how much to take  when to take  "this     * valsartan 40 MG tablet  Commonly known as: DIOVAN  Take 1 tablet (40 mg total) by mouth once daily.  What changed: You were already taking a medication with the same name, and this prescription was added. Make sure you understand how and when to take each.           * This list has 2 medication(s) that are the same as other medications prescribed for you. Read the directions carefully, and ask your doctor or other care provider to review them with you.                CONTINUE taking these medications      acetaminophen 325 MG tablet  Commonly known as: TYLENOL  Take 2 tablets (650 mg total) by mouth every 6 (six) hours as needed.     * albuterol sulfate 2.5 mg/0.5 mL Nebu  Take 2.5 mg by nebulization every 6 (six) hours while awake. Rescue     * albuterol 90 mcg/actuation inhaler  Commonly known as: PROVENTIL/VENTOLIN HFA  INHALE 2 PUFFS BY MOUTH EVERY 6 HOURS AS NEEDED FOR WHEEZING OR  SHORTNESS  OF  BREATH     * albuterol 2.5 mg /3 mL (0.083 %) nebulizer solution  Commonly known as: PROVENTIL  Take by nebulization.     AREXVY (PF) 120 mcg/0.5 mL Susr vaccine  Generic drug: RSVPreF3 antigen-AS01E (PF)     atorvastatin 40 MG tablet  Commonly known as: LIPITOR  Take 40 mg by mouth once daily.     BD ULTRA-FINE SHORT PEN NEEDLE 31 gauge x 5/16" Ndle  Generic drug: pen needle, diabetic     blood sugar diagnostic Strp  To check BG 3 times daily, to use with insurance preferred meter     CERAVE DAILY MOISTURIZING Lotn  Generic drug: ceramides 1,3,6-II  Apply twice daily to skin     cinnamon bark 500 mg capsule  Take 1,000 mg by mouth once daily.     clopidogreL 75 mg tablet  Commonly known as: PLAVIX  Take 75 mg by mouth.     * diclofenac sodium 1 % Gel  Commonly known as: VOLTAREN  Apply 2 g topically 2 (two) times daily.     * diclofenac sodium 1 % Gel  Commonly known as: VOLTAREN  Apply 2 g topically 3 (three) times daily.     DULoxetine 60 MG capsule  Commonly known as: CYMBALTA  Take 1 capsule (60 mg " "total) by mouth once daily.     fluticasone propionate 50 mcg/actuation nasal spray  Commonly known as: FLONASE  1 spray (50 mcg total) by Each Nostril route 2 (two) times daily.     furosemide 40 MG tablet  Commonly known as: LASIX  Take 40 mg by mouth once daily.     gabapentin 300 MG capsule  Commonly known as: NEURONTIN  Take 4 capsules (1,200 mg total) by mouth 2 (two) times daily.     glimepiride 4 MG tablet  Commonly known as: AMARYL  Take 4 mg by mouth daily with breakfast.     insulin NPH-insulin regular (70/30) 100 unit/mL (70-30) injection  Inject into the skin. Inject 10 units at breakfast and inject 10 units at night     insulin syringe-needle U-100 0.3 mL 31 gauge x 15/64" Syrg  USE TO INJECT INSULIN THREE TIMES DAILY     * insulin syringe-needle U-100 0.5 mL 31 gauge x 5/16" Syrg     * BD INSULIN SYRINGE ULTRA-FINE 1/2 mL 31 gauge x 15/64" Syrg  Generic drug: insulin syringe-needle U-100     ipratropium 42 mcg (0.06 %) nasal spray  Commonly known as: ATROVENT  2 sprays by Nasal route every 6 (six) hours as needed for Rhinitis (use as needed for runny nose and also use 15 minutes prior to meal). Clean nose with saline prior to use     isosorbide mononitrate 30 MG 24 hr tablet  Commonly known as: IMDUR  Take 30 mg by mouth once daily.     lancets Misc  To check BG 3 times daily, to use with insurance preferred meter     LIDOcaine 5 %  Commonly known as: LIDODERM  Place 1 patch onto the skin once daily. Remove & Discard patch within 12 hours or as directed by MD     meclizine 12.5 mg tablet  Commonly known as: ANTIVERT  Take 1 tablet (12.5 mg total) by mouth 3 (three) times daily as needed for Dizziness.     metFORMIN 500 MG tablet  Commonly known as: GLUCOPHAGE  Take 500 mg by mouth 2 (two) times daily.     methocarbamoL 500 MG Tab  Commonly known as: ROBAXIN  TAKE 1 TABLET BY MOUTH 4 TIMES DAILY FOR 10 DAYS     mupirocin 2 % ointment  Commonly known as: BACTROBAN  Apply topically 3 (three) times " daily.     nitroGLYCERIN 0.4 MG SL tablet  Commonly known as: NITROSTAT  DISSOLVE 1 TABLET UNDER THE TONGUE EVERY 5 MINUTES AS  NEEDED FOR CHEST PAIN. MAX  OF 3 TABLETS IN 15 MINUTES. CALL 911 IF PAIN PERSISTS.     pravastatin 20 MG tablet  Commonly known as: PRAVACHOL  Take 1 tablet (20 mg total) by mouth once daily.     PRESERVISION LUTEIN ORAL  Take by mouth.     STIOLTO RESPIMAT 2.5-2.5 mcg/actuation Mist  Generic drug: tiotropium-olodateroL  Inhale 2 puffs into the lungs once daily. Controller     triamcinolone acetonide 0.1% 0.1 % cream  Commonly known as: KENALOG  SMARTSI Application Topical 2-3 Times Daily     warfarin 5 MG tablet  Commonly known as: COUMADIN  Take 2 tabs by mouth on Tuesday,Thursday, Saturday and Sundays then 1.5 on all other days.           * This list has 7 medication(s) that are the same as other medications prescribed for you. Read the directions carefully, and ask your doctor or other care provider to review them with you.                STOP taking these medications      blood-glucose meter kit     ENTRESTO  mg per tablet  Generic drug: sacubitriL-valsartan              Indwelling Lines/Drains at time of discharge:   Lines/Drains/Airways       Drain  Duration             Male External Urinary Catheter 24 1500 3 days                    Time spent on the discharge of patient: 35 minutes         Kush Mosquera MD  Department of Hospital Medicine  [facility]

## 2024-06-29 NOTE — PLAN OF CARE
Case Management Final Discharge Note      Discharge Disposition: Home health with [facility] and [facility]: [phone].     New DME ordered / company name: None    Relevant SDOH / Transition of Care Barriers: None    Primary Caretaker and contact information: Kalyani Felder (Daughter) [phone]      Scheduled followup appointment: Patient instructed to follow up with PCP and Cardiology    Referrals placed: Referral placed to Home Palliative Care    Transportation: Private vehicle/family taking patient home        Patient and family educated on discharge services and updated on DC plan. Per Roberto response, [facility] [place]: [phone], willing to provide palliative care to patient upon discharge.  Spoke with patient's daughter; she stated [facility] reached out to her regarding palliative care and she will be contacting them to begin services once patient discharges. Bedside RN notified, patient clear to discharge from Case Management Perspective.    06/29/24 1038   Final Note   Assessment Type Final Discharge Note   Anticipated Discharge Disposition Home-Health   Hospital Resources/Appts/Education Provided Provided patient/caregiver with written discharge plan information;Post-Acute resouces added to AVS   Post-Acute Status   Post-Acute Authorization Home Health   Home Health Status Set-up Complete/Auth obtained   Coverage [employer] [facility] [facility] -   Discharge Delays None known at this time

## 2024-06-29 NOTE — SUBJECTIVE & OBJECTIVE
Interval History:  Patient is seen today for follow-up care.  Patient being treated for right lung pneumonia.  Patient feels his shortness of breath symptoms are gradually improving.  No chest pain or vomiting.  Patient's blood cultures 1/2 growing gram positive cocci coagulase-negative staph, likely  contaminant.  Possible discharge home tomorrow if patient continues to improve.          Review of Systems   Constitutional: Negative.    HENT: Negative.     Eyes: Negative.    Respiratory: Negative.     Cardiovascular: Negative.    Gastrointestinal: Negative.    Endocrine: Negative.    Genitourinary: Negative.    Musculoskeletal: Negative.    Skin: Negative.    Allergic/Immunologic: Negative.    Neurological: Negative.    Hematological: Negative.    Psychiatric/Behavioral: Negative.       Objective:     Vital Signs (Most Recent):  Temp: 98.6 °F (37 °C) (06/29/24 0757)  Pulse: 95 (06/29/24 0808)  Resp: 19 (06/29/24 0757)  BP: 114/69 (06/29/24 0757)  SpO2: 95 % (06/29/24 0757) Vital Signs (24h Range):  Temp:  [97.5 °F (36.4 °C)-98.6 °F (37 °C)] 98.6 °F (37 °C)  Pulse:  [] 95  Resp:  [17-24] 19  SpO2:  [92 %-97 %] 95 %  BP: (114-140)/(65-80) 114/69     Weight: 93 kg (205 lb)  Body mass index is 31.17 kg/m².    Intake/Output Summary (Last 24 hours) at 6/29/2024 1025  Last data filed at 6/29/2024 0544  Gross per 24 hour   Intake 565.98 ml   Output 1200 ml   Net -634.02 ml         Physical Exam  Vitals and nursing note reviewed.   Constitutional:       Appearance: Normal appearance. He is normal weight.   HENT:      Head: Normocephalic and atraumatic.      Right Ear: External ear normal.      Left Ear: External ear normal.      Nose: Nose normal.      Mouth/Throat:      Mouth: Mucous membranes are moist.      Pharynx: Oropharynx is clear.   Eyes:      Extraocular Movements: Extraocular movements intact.      Conjunctiva/sclera: Conjunctivae normal.      Pupils: Pupils are equal, round, and reactive to light.  "  Cardiovascular:      Rate and Rhythm: Normal rate and regular rhythm.      Pulses: Normal pulses.      Heart sounds: Normal heart sounds.   Pulmonary:      Effort: Pulmonary effort is normal.      Breath sounds: Rhonchi present.   Abdominal:      General: Abdomen is flat. Bowel sounds are normal.      Palpations: Abdomen is soft.   Musculoskeletal:         General: Normal range of motion.      Cervical back: Normal range of motion and neck supple.   Skin:     General: Skin is warm.      Capillary Refill: Capillary refill takes less than 2 seconds.   Neurological:      General: No focal deficit present.      Mental Status: He is alert and oriented to person, place, and time. Mental status is at baseline.      Cranial Nerves: No cranial nerve deficit.   Psychiatric:         Mood and Affect: Mood normal.         Behavior: Behavior normal.         Thought Content: Thought content normal.         Judgment: Judgment normal.             Significant Labs: All pertinent labs within the past 24 hours have been reviewed.  Blood Culture: No results for input(s): "LABBLOO" in the last 48 hours.  CBC:   Recent Labs   Lab 06/28/24  0519 06/29/24  0431   WBC 6.50 6.39   HGB 10.4* 10.0*   HCT 30.9* 31.2*    198     CMP:   Recent Labs   Lab 06/28/24  0519 06/29/24  0431   * 129*   K 4.4 4.2   CL 95 92*   CO2 26 27   * 282*   BUN 21 19   CREATININE 1.3 1.4   CALCIUM 8.9 8.8   ANIONGAP 10 10       Significant Imaging: I have reviewed all pertinent imaging results/findings within the past 24 hours.  "

## 2024-06-29 NOTE — PLAN OF CARE
06/29/24 1242   Medicare Message   Important Message from Medicare regarding Discharge Appeal Rights Given to patient/caregiver;Explained to patient/caregiver;Signed/date by patient/caregiver   Date IMM was signed 06/29/24   Time IMM was signed [id number]

## 2024-06-29 NOTE — NURSING
[facility] [place]  Nurses Note -- 4 Eyes        Date: 06/29/2024        Skin assessed on: Q shift        [x] No Pressure Injuries Present                 []Prevention Measures Documented     [] Yes LDA Previously documented      [] Yes New Pressure Injury Discovered              [] LDA Added        Attending RN: RADHA Cordoba     Second RN:  DOC Hoffmann

## 2024-06-29 NOTE — PROGRESS NOTES
[facility] Medicine  Progress Note    Patient Name: Percy Ramirez  MRN: [id number]  Patient Class: IP- Inpatient   Admission Date: 6/24/2024  Length of Stay: 4 days  Attending Physician: Kush Mosquera MD  Primary Care Provider: Kasie Edmondson MD        Subjective:     Principal Problem:Sepsis without acute organ dysfunction        HPI:  83-year-old  male with past medical history significant for non-insulin-dependent type 2 diabetes, essential hypertension, COPD (on 4L  home oxygen)/ILD, h/o CVA,  permanent atrial fibrillation (on Coumadin 10 mg every day except for 7.5 mg on Monday Wednesday Friday), CAD (s/p 2V CABG 2024 and follow with Dr. Melissa/[facility]) presents to the ER with fevers and reports a generalized weakness and chills.  States increased cough but always has a cough.  Denies any productive sputum.  States he was short of breath and has dyspnea on exertion worsened normal.  With complaining of abdominal pain but no reports of nausea vomiting or diarrhea.    Workup in the ER for mildly elevated white blood count of 10.  Lactic acid was normal.  Liver enzymes were normal.  Pro count was normal.  His INR was 2.  Urine was not consistent with urinary tract infection.  COVID/FLU negative.     Chest x-ray:  Median sternotomy changes are present.  AP portable chest radiograph demonstrates a cardiac silhouette within normal limits.  Vascular calcifications are seen at the aortic knob.  There is nonspecific prominence of the interstitium.  There is no pneumothorax.  There is no focal consolidation, pneumothorax, or pleural effusion.     CT of the abdomen  1.. No evidence of hydronephrosis/obstructive uropathy bilaterally.  Nonobstructing right nephrolithiasis.  2. Right lower lobe consolidative change superimposed upon a background of diffuse emphysematous change.  Correlation for infectious process advised.  3. Cholelithiasis noting mild distention of the gallbladder.   Further assessment as warranted.  4. Left inguinal hernia containing fat and a portion of the urinary bladder.  5. 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    Abdominal ultrasound:   Cholelithiasis and distended gallbladder.  No definite secondary sonographic evidence to suggest acute cholecystitis at this time noting there is no gallbladder wall thickening or hyperemia and reported negative sonographic Cervantes sign although the patient received pain medication prior to study which can limit assessment.  Clinical correlation and further assessment as warranted.      Due to  His port score is 121 he was started  IV abx Vanc and Zosyn to cover for possible Right lower lobe PNA.      Because this patient's clinical condition is guarded due to his sepsis and requires IVF abx and close monitoring of his respiratory status, care in an alternative location isn't clinically appropriate for the reasons stated above.     We have been consulted for further management and inpatient telemetry admission to hospitalist Medicine Service.           Overview/Hospital Course:  Patient with past medical history significant for non-insulin-dependent type 2 diabetes, essential hypertension, COPD (on 4L home oxygen)/ILD, h/o CVA, permanent atrial fibrillation (on Coumadin 10 mg every day except for 7.5 mg on Monday Wednesday Friday), CAD (s/p 2V CABG 2024 and follow with Dr. Melissa/[facility]) presents to the ER with fevers and reports a generalized weakness and chill ,patient is on IV Abx for URI.consulted ST for possible aspiration,on soft diet.  Consulted PT,OT,recommending SNF,but family wasn't go home with HH.  Fever  is resolved,blood culture no growth.  Patient improved shortness of breath, cough, congestion for right lung pneumonia.   Patient was treated with IV Rocephin and doxycycline p.o..  Patient's blood cultures show 1/2 growing Gram-positive cocci coagulase-negative staph, likely contaminant.  Patient to be discharged home after echo done to assess LV function due to history of chronic CHF.  Patient will be discharged on cefdinir 300 mg p.o. b.i.d. for 7 days, doxycycline 100 mg p.o. b.i.d. for 7 days.  Patient's CHF medications GDM T doses were decreased due to concerns of low normal blood pressure.  Coreg decreased to 3.125 mg p.o. b.i.d., spironolactone 12.5 mg daily, valsartan 40 mg daily.  Follow up with PCP in 1 week.  Cardiology clinic in 2 weeks or as scheduled.  The patient if he develops chest pains, shortness for breath, nausea, vomiting, abdominal pain type symptoms he should go to the local ER.  Patient to be discharged home with home health care with palliative care.  Patient verbalized understanding.   assist with discharge planning, input appreciated.    Interval History:  Patient is seen today for follow-up care.  Patient being treated for right lung pneumonia.  Patient feels his shortness of breath symptoms are gradually improving.  No chest pain or vomiting.  Patient's blood cultures 1/2 growing gram positive cocci coagulase-negative staph, likely  contaminant.  Possible discharge home tomorrow if patient continues to improve.          Review of Systems   Constitutional: Negative.    HENT: Negative.     Eyes: Negative.    Respiratory: Negative.     Cardiovascular: Negative.    Gastrointestinal: Negative.    Endocrine: Negative.    Genitourinary: Negative.    Musculoskeletal: Negative.    Skin: Negative.    Allergic/Immunologic: Negative.    Neurological: Negative.    Hematological: Negative.    Psychiatric/Behavioral: Negative.       Objective:     Vital Signs (Most Recent):  Temp: 98.6 °F (37 °C) (06/29/24 0757)  Pulse: 95 (06/29/24 0808)  Resp: 19 (06/29/24 0757)  BP: 114/69 (06/29/24 0757)  SpO2: 95 %  (06/29/24 0757) Vital Signs (24h Range):  Temp:  [97.5 °F (36.4 °C)-98.6 °F (37 °C)] 98.6 °F (37 °C)  Pulse:  [] 95  Resp:  [17-24] 19  SpO2:  [92 %-97 %] 95 %  BP: (114-140)/(65-80) 114/69     Weight: 93 kg (205 lb)  Body mass index is 31.17 kg/m².    Intake/Output Summary (Last 24 hours) at 6/29/2024 1025  Last data filed at 6/29/2024 0544  Gross per 24 hour   Intake 565.98 ml   Output 1200 ml   Net -634.02 ml         Physical Exam  Vitals and nursing note reviewed.   Constitutional:       Appearance: Normal appearance. He is normal weight.   HENT:      Head: Normocephalic and atraumatic.      Right Ear: External ear normal.      Left Ear: External ear normal.      Nose: Nose normal.      Mouth/Throat:      Mouth: Mucous membranes are moist.      Pharynx: Oropharynx is clear.   Eyes:      Extraocular Movements: Extraocular movements intact.      Conjunctiva/sclera: Conjunctivae normal.      Pupils: Pupils are equal, round, and reactive to light.   Cardiovascular:      Rate and Rhythm: Normal rate and regular rhythm.      Pulses: Normal pulses.      Heart sounds: Normal heart sounds.   Pulmonary:      Effort: Pulmonary effort is normal.      Breath sounds: Rhonchi present.   Abdominal:      General: Abdomen is flat. Bowel sounds are normal.      Palpations: Abdomen is soft.   Musculoskeletal:         General: Normal range of motion.      Cervical back: Normal range of motion and neck supple.   Skin:     General: Skin is warm.      Capillary Refill: Capillary refill takes less than 2 seconds.   Neurological:      General: No focal deficit present.      Mental Status: He is alert and oriented to person, place, and time. Mental status is at baseline.      Cranial Nerves: No cranial nerve deficit.   Psychiatric:         Mood and Affect: Mood normal.         Behavior: Behavior normal.         Thought Content: Thought content normal.         Judgment: Judgment normal.             Significant Labs: All pertinent  "labs within the past 24 hours have been reviewed.  Blood Culture: No results for input(s): "LABBLOO" in the last 48 hours.  CBC:   Recent Labs   Lab 06/28/24  0519 06/29/24  0431   WBC 6.50 6.39   HGB 10.4* 10.0*   HCT 30.9* 31.2*    198     CMP:   Recent Labs   Lab 06/28/24  0519 06/29/24  0431   * 129*   K 4.4 4.2   CL 95 92*   CO2 26 27   * 282*   BUN 21 19   CREATININE 1.3 1.4   CALCIUM 8.9 8.8   ANIONGAP 10 10       Significant Imaging: I have reviewed all pertinent imaging results/findings within the past 24 hours.    Assessment/Plan:      Advance care planning  Patient was a full code status      Dysphagia  -patient was tolerating diet and medications.  -speech therapy to evaluate treat      Pneumonia of right lower lobe due to infectious organism  Continue antibiotics for now wean oxygen to his home levels of 4 L.  Follow up on sputum and blood cultures.  Fever  is resolved,blood culture no growth.    -1/4 blood cultures growing Gram-positive cocci, id and sensitivity pending.  Growing coagulase-negative staph likely Contaminant  -patient on doxycycline, add Rocephin 1 g q.day.  -discharge home with oral antibiotics , cefdinir 300 mg p.o. b.i.d. for 7 days, doxycycline 100 mg p.o. b.i.d. for 7 days.    Chronic kidney disease, stage 3a  Creatinine baseline closer to 1.5-1.6.  Currently mildly elevated at 1.9.  Continue to monitor for now especially monitor for bouts of hypotension given sepsis.  Blood pressure medications are resumed but with hold parameters for low normal blood pressure.  Follow up on repeat labs.    -trend renal function, creatinine down to 1.3  -avoid NSAIDs and nephrotoxic agents    History of stroke  -continue Lipitor, patient on Coumadin for history of atrial fibrillation      ILD (interstitial lung disease)  On 4 liter home oxygen.  -follow up in outpatient pulmonary clinic      Persistent atrial fibrillation  Patient with Persistent (7 days or more) atrial " "fibrillation which is controlled currently with Beta Blocker. Patient is currently in sinus rhythm.[id number] Score: 4. Anticoagulation indicated. Anticoagulation done with Coumadin.  INR in appropriate range.  Continue home dose of Coumadin and monitor closely given use of antibiotics at this time .    -patient on Coumadin, INR 2.5<3.4>3.3  -echo ordered to assess LV function  -continue medications for CHF, dose reduced due to low blood pressure.  -follow up with cardiology clinic in 2 weeks or as scheduled.    Type 2 diabetes, with peripheral circulatory disorder not at goal  Patient's FSGs are controlled on current medication regimen.  Last A1c reviewed-   Lab Results   Component Value Date    LABA1C 6.8 10/09/2017    HGBA1C 9.8 (H) 03/18/2024     Most recent fingerstick glucose reviewed-   No results for input(s): "POCTGLUCOSE" in the last 24 hours.    Current correctional scale  Medium  Maintain anti-hyperglycemic dose as follows-   Antihyperglycemics (From admission, onward)      Start     Stop Route Frequency Ordered    06/29/24 0715  insulin aspart protamine-insulin aspart (NovoLOG 70/30) injection        Placed in "And" Linked Group    -- SubQ with breakfast 06/28/24 1611    06/28/24 1715  insulin aspart protamine-insulin aspart (NovoLOG 70/30) injection        Placed in "And" Linked Group    -- SubQ with dinner 06/28/24 1611          Hold Oral hypoglycemics while patient is in the hospital.    Poorly controlled type 2 diabetes mellitus with neuropathy  Patient's FSGs are uncontrolled due to hyperglycemia on current medication regimen.  Last A1c reviewed-   Lab Results   Component Value Date    LABA1C 6.8 10/09/2017    HGBA1C 9.8 (H) 03/18/2024     Most recent fingerstick glucose reviewed-   No results for input(s): "POCTGLUCOSE" in the last 24 hours.    Current correctional scale  Medium  Maintain anti-hyperglycemic dose as follows-   Antihyperglycemics (From admission, onward)      Start     Stop Route " "Frequency Ordered    06/29/24 0715  insulin aspart protamine-insulin aspart (NovoLOG 70/30) injection        Placed in "And" Linked Group    -- SubQ with breakfast 06/28/24 1611    06/28/24 1715  insulin aspart protamine-insulin aspart (NovoLOG 70/30) injection        Placed in "And" Linked Group    -- SubQ with dinner 06/28/24 1611          Hold Oral hypoglycemics while patient is in the hospital.  Diet adjusted.    Macular degeneration  Follow up in clinic      Coronary artery disease  No complaints of chest pain at this time.  Continue home medications.  Monitor on telemetry.  Support blood pressure and heart rate.      VTE Risk Mitigation (From admission, onward)           Ordered     warfarin tablet 7.5 mg  Every Mon, Wed, Fri         06/25/24 0654     warfarin (COUMADIN) tablet 10 mg  Every Tues, Thurs, Sat, Sun         06/25/24 0654                    Discharge Planning   TATI: 6/29/2024     Code Status: Full Code   Is the patient medically ready for discharge?:     Reason for patient still in hospital (select all that apply): Patient trending condition, Treatment, PT / OT recommendations, and Pending disposition  Discharge Plan A: Home Health, NP at home                  Kush Mosquera MD  Department of Hospital Medicine   [facility]    "

## 2024-06-29 NOTE — DISCHARGE SUMMARY
[facility] [place] - Med Surg      HOME HEALTH ORDERS  FACE TO FACE ENCOUNTER    Patient Name: Percy Ramirez  YOB: 1940    PCP: Kasie Edmondson MD   PCP Address: [address] / [place] [id number]  PCP Phone Number: [phone]  PCP Fax: [phone]    Encounter Date: 6/24/24    Admit to Home Health    Diagnoses:  Active Hospital Problems    Diagnosis  POA    Pneumonia of right lower lobe due to infectious organism [J18.9]  Yes    Dysphagia [R13.10]  Yes    Advance care planning [Z71.89]  Not Applicable    Chronic kidney disease, stage 3a [N18.31]  Yes    History of stroke [Z86.73]  Not Applicable     - right occipital      ILD (interstitial lung disease) [J84.9]  Yes     -Emphysematous changes on ct along with basilar reticulation.  pft with moderate obstructive and mild restrictive physiology.  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 Melissa    Overview:   s/p CABG      Macular degeneration [H35.30]  Yes     - followed by Dr. Arevalo        Resolved Hospital Problems    Diagnosis Date Resolved POA    *Sepsis without acute organ dysfunction [A41.9] 06/29/2024 Yes    COPD with acute exacerbation [J44.1] 06/29/2024 Yes     - Quit smoking since 1960s.   -fev1 52%  -Patient is up to date with vaccination.    -Declines pulmonary rehab  -On home oxygen which helps  -continue with Stiolto  -walker and wheelchair dependant.          Follow Up Appointments:  Future Appointments   Date Time Provider Department Center   7/2/2024 11:30 AM Jareth Alexandre MD [facility]   8/30/2024 10:00 AM Fernando Downs  [facility] [place] [facility]   9/24/2024  8:00 AM Marifer Romero DPM [facility] POD [place]   11/19/2024  9:00 AM Rachael Bagley NP [facility] FAM MED [place]   5/26/2025  9:20 AM Kasie Edmondson MD [facility] FAM MED [place]       Allergies:  Review of patient's allergies indicates:   Allergen Reactions    Aricept odt [donepezil] Diarrhea and Nausea And Vomiting    Codeine Nausea Only     weak    Ranexa [ranolazine]        Medications: Review discharge medications with patient and family and provide education.    Current Facility-Administered Medications   Medication Dose Route Frequency Provider Last Rate Last Admin    albuterol-ipratropium 2.5 mg-0.5 mg/3 mL nebulizer solution 3 mL  3 mL Nebulization Q6H WAKE Trudy Morris MD   3 mL at 06/29/24 0728    albuterol-ipratropium 2.5 mg-0.5 mg/3 mL nebulizer solution 3 mL  3 mL Nebulization Q4H PRN Trudy Morris MD        atorvastatin tablet 40 mg  40 mg Oral QHS Trudy Morris MD   40 mg at 06/28/24 2110    cefTRIAXone (Rocephin) 1 g in D5W 100 mL IVPB (MB+)  1 g Intravenous Q24H Kush Mosquera MD   Stopped at 06/28/24 1230    doxycycline tablet 100 mg  100 mg Oral Q12H Marcela Mayers MD   100 mg at 06/29/24 0847    DULoxetine DR capsule 60 mg  60 mg Oral Daily Trudy Morris MD   60 mg at 06/29/24 0847    fluticasone propionate 50 mcg/actuation nasal spray 50 mcg  1 spray Each Nostril BID Trudy Morris MD   50 mcg at 06/29/24 0847    furosemide tablet 40 mg  40 mg Oral Daily Trudy Morris MD   40 mg at 06/29/24 0847    gabapentin capsule 900 mg  900 mg Oral BID Trudy Morris MD   900 mg at 06/29/24 0847    guaiFENesin 100 mg/5 ml syrup 200 mg  200 mg Oral Q4H PRN Marcela Mayers MD   200 mg at 06/28/24 2110    HYDROcodone-acetaminophen 7.5-325 mg per tablet 1 tablet  1 tablet Oral Q8H PRN Trudy Morris MD   1 tablet at 06/27/24 1511    insulin aspart protamine-insulin aspart (NovoLOG 70/30) injection  15 Units  Subcutaneous with breakfast Kush Mosquera MD   15 Units at 06/29/24 0848    And    insulin aspart protamine-insulin aspart (NovoLOG 70/30) injection  10 Units Subcutaneous with dinner Kush Mosquera MD   10 Units at 06/28/24 1705    nitroGLYCERIN SL tablet 0.4 mg  0.4 mg Sublingual Q5 Min PRN Trudy Morris MD        pantoprazole suspension 40 mg  40 mg Oral Daily Marcela Mayers MD   40 mg at 06/29/24 0847    warfarin (COUMADIN) tablet 10 mg  10 mg Oral Every Tues, Thurs, Sat, Sun Trudy Morris MD   10 mg at 06/27/24 1659    warfarin tablet 7.5 mg  7.5 mg Oral Every Mon, Wed, Fri Trudy Morris MD   7.5 mg at 06/26/24 1645     Current Discharge Medication List        START taking these medications    Details   cefdinir (OMNICEF) 300 MG capsule Take 1 capsule (300 mg total) by mouth 2 (two) times daily. for 7 days  Qty: 14 capsule, Refills: 0      doxycycline (VIBRA-TABS) 100 MG tablet Take 1 tablet (100 mg total) by mouth every 12 (twelve) hours. for 7 days  Qty: 14 tablet, Refills: 0      omeprazole (PRILOSEC) 20 MG capsule Take 1 capsule (20 mg total) by mouth once daily.  Qty: 30 capsule, Refills: 2           CONTINUE these medications which have CHANGED    Details   carvediloL (COREG) 3.125 MG tablet Take 1 tablet (3.125 mg total) by mouth 2 (two) times daily with meals.  Qty: 60 tablet, Refills: 2    Comments: .      spironolactone (ALDACTONE) 25 MG tablet Take 0.5 tablets (12.5 mg total) by mouth once daily.    Comments: .      valsartan (DIOVAN) 80 MG tablet Take 0.5 tablets (40 mg total) by mouth once daily.    Comments: .           CONTINUE these medications which have NOT CHANGED    Details   acetaminophen (TYLENOL) 325 MG tablet Take 2 tablets (650 mg total) by mouth every 6 (six) hours as needed.  Qty: 13 tablet, Refills: 0      albuterol (PROVENTIL) 2.5 mg /3 mL (0.083 %) nebulizer solution Take by nebulization.      albuterol (PROVENTIL/VENTOLIN HFA) 90 mcg/actuation  "inhaler INHALE 2 PUFFS BY MOUTH EVERY 6 HOURS AS NEEDED FOR WHEEZING OR  SHORTNESS  OF  BREATH  Qty: 8 g, Refills: 3    Associated Diagnoses: COPD exacerbation      albuterol sulfate 2.5 mg/0.5 mL Nebu Take 2.5 mg by nebulization every 6 (six) hours while awake. Rescue  Qty: 90 each, Refills: 1      AREXVY, PF, 120 mcg/0.5 mL SusR vaccine       atorvastatin (LIPITOR) 40 MG tablet Take 40 mg by mouth once daily.      BD INSULIN PEN NEEDLE UF SHORT 31 gauge x 5/16" Ndle       BD INSULIN SYRINGE ULTRA-FINE 1/2 mL 31 gauge x 15/64" Syrg       blood sugar diagnostic Strp To check BG 3 times daily, to use with insurance preferred meter  Qty: 200 strip, Refills: 11    Associated Diagnoses: Type 2 diabetes, uncontrolled, with retinopathy      ceramides 1,3,6-II (CERAVE DAILY MOISTURIZING) Lotn Apply twice daily to skin  Qty: 355 mL, Refills: 1    Associated Diagnoses: Pruritic dermatitis      cinnamon bark 500 mg capsule Take 1,000 mg by mouth once daily.        clopidogreL (PLAVIX) 75 mg tablet Take 75 mg by mouth.      !! diclofenac sodium (VOLTAREN) 1 % Gel Apply 2 g topically 2 (two) times daily.  Qty: 100 g, Refills: 0    Associated Diagnoses: Acute right-sided low back pain with right-sided sciatica; Right hip pain      !! diclofenac sodium (VOLTAREN) 1 % Gel Apply 2 g topically 3 (three) times daily.  Qty: 50 g, Refills: 0      DULoxetine (CYMBALTA) 60 MG capsule Take 1 capsule (60 mg total) by mouth once daily.  Qty: 90 capsule, Refills: 3      fluticasone propionate (FLONASE) 50 mcg/actuation nasal spray 1 spray (50 mcg total) by Each Nostril route 2 (two) times daily.  Qty: 18.2 mL, Refills: 3      furosemide (LASIX) 40 MG tablet Take 40 mg by mouth once daily.      gabapentin (NEURONTIN) 300 MG capsule Take 4 capsules (1,200 mg total) by mouth 2 (two) times daily.  Qty: 540 capsule, Refills: 1      glimepiride (AMARYL) 4 MG tablet Take 4 mg by mouth daily with breakfast.       HYDROcodone-acetaminophen (NORCO) " "7.5-325 mg per tablet Take 1 tablet by mouth every 8 (eight) hours as needed for Pain.  Qty: 90 tablet, Refills: 0    Comments: Quantity prescribed more than 7 day supply? Yes, quantity medically necessary.  Associated Diagnoses: DDD (degenerative disc disease), lumbar; Lumbar spondylosis; Lumbar radiculopathy; Spinal stenosis of lumbar region with neurogenic claudication      insulin NPH-insulin regular, 70/30, (NOVOLIN 70/30) 100 unit/mL (70-30) injection Inject into the skin. Inject 10 units at breakfast and inject 10 units at night      insulin syringe-needle U-100 0.3 mL 31 gauge x 15/64" Syrg USE TO INJECT INSULIN THREE TIMES DAILY      insulin syringe-needle U-100 1/2 mL 31 x 5/16" Syrg       ipratropium (ATROVENT) 42 mcg (0.06 %) nasal spray 2 sprays by Nasal route every 6 (six) hours as needed for Rhinitis (use as needed for runny nose and also use 15 minutes prior to meal). Clean nose with saline prior to use  Qty: 15 mL, Refills: 3      isosorbide mononitrate (IMDUR) 30 MG 24 hr tablet Take 30 mg by mouth once daily.      lancets Misc To check BG 3 times daily, to use with insurance preferred meter  Qty: 200 each, Refills: 11    Associated Diagnoses: Type 2 diabetes, uncontrolled, with retinopathy      LIDOcaine (LIDODERM) 5 % Place 1 patch onto the skin once daily. Remove & Discard patch within 12 hours or as directed by MD  Qty: 5 patch, Refills: 1      meclizine (ANTIVERT) 12.5 mg tablet Take 1 tablet (12.5 mg total) by mouth 3 (three) times daily as needed for Dizziness.  Qty: 90 tablet, Refills: 0    Associated Diagnoses: Peripheral vertigo involving left ear      metFORMIN (GLUCOPHAGE) 500 MG tablet Take 500 mg by mouth 2 (two) times daily.      methocarbamoL (ROBAXIN) 500 MG Tab TAKE 1 TABLET BY MOUTH 4 TIMES DAILY FOR 10 DAYS  Qty: 60 tablet, Refills: 0    Associated Diagnoses: Muscle spasm      mupirocin (BACTROBAN) 2 % ointment Apply topically 3 (three) times daily.  Qty: 1 g, Refills: 1    "   nitroGLYCERIN (NITROSTAT) 0.4 MG SL tablet DISSOLVE 1 TABLET UNDER THE TONGUE EVERY 5 MINUTES AS  NEEDED FOR CHEST PAIN. MAX  OF 3 TABLETS IN 15 MINUTES. CALL 911 IF PAIN PERSISTS.      pravastatin (PRAVACHOL) 20 MG tablet Take 1 tablet (20 mg total) by mouth once daily.  Qty: 90 tablet, Refills: 1    Associated Diagnoses: Hyperlipidemia LDL goal <100      tiotropium-olodateroL (STIOLTO RESPIMAT) 2.5-2.5 mcg/actuation Mist Inhale 2 puffs into the lungs once daily. Controller  Qty: 1 g, Refills: 11    Associated Diagnoses: COPD with chronic bronchitis and emphysema      triamcinolone acetonide 0.1% (KENALOG) 0.1 % cream SMARTSI Application Topical 2-3 Times Daily      VIT C/VIT E AC/LUT/COPPER/ZINC (PRESERVISION LUTEIN ORAL) Take by mouth.      warfarin (COUMADIN) 5 MG tablet Take 2 tabs by mouth on Tuesday,Thursday, Saturday and Sundays then 1.5 on all other days.       !! - Potential duplicate medications found. Please discuss with provider.        STOP taking these medications       blood-glucose meter kit Comments:   Reason for Stopping:         ENTRESTO  mg per tablet Comments:   Reason for Stopping:                 I have seen and examined this patient within the last 30 days. My clinical findings that support the need for the home health skilled services and home bound status are the following:no   Weakness/numbness causing balance and gait disturbance due to Stroke, Heart Failure, COPD Exacerbation, and Weakness/Debility making it taxing to leave home.  Requiring assistive device to leave home due to unsteady gait caused by  Stroke, Heart Failure, COPD Exacerbation, and Weakness/Debility.  Patient with medication mismanagement issues requiring home bound status as evidenced by  Unstable vital signs (blood pressure, heart rate) and Poor understanding of medication regimen/dosage.     Diet:   diabetic diet  calorie    Labs:  Report Lab results to PCP.    Referrals/ Consults  Physical Therapy to  evaluate and treat. Evaluate for home safety and equipment needs; Establish/upgrade home exercise program. Perform / instruct on therapeutic exercises, gait training, transfer training, and Range of Motion.   to evaluate for community resources/long-range planning.  Aide to provide assistance with personal care, ADLs, and vital signs.    Activities:   activity as tolerated    Nursing:   Agency to admit patient within 24 hours of hospital discharge unless specified on physician order or at patient request    SN to complete comprehensive assessment including routine vital signs. Instruct on disease process and s/s of complications to report to MD. Review/verify medication list sent home with the patient at time of discharge  and instruct patient/caregiver as needed. Frequency may be adjusted depending on start of care date.     Skilled nurse to perform up to 3 visits PRN for symptoms related to diagnosis    Notify MD if SBP > 160 or < 90; DBP > 90 or < 50; HR > 120 or < 50; Temp > 101; O2 < 88%; Other:       Ok to schedule additional visits based on staff availability and patient request on consecutive days within the home health episode.    When multiple disciplines ordered:    Start of Care occurs on Sunday - Wednesday schedule remaining discipline evaluations as ordered on separate consecutive days following the start of care.    Thursday SOC -schedule subsequent evaluations Friday and Monday the following week.     Friday - Saturday SOC - schedule subsequent discipline evaluations on consecutive days starting Monday of the following week.    For all post-discharge communication and subsequent orders please contact patient's primary care physician. If unable to reach primary care physician or do not receive response within 30 minutes, please contact PCP for clinical staff order clarification    Miscellaneous   Home Oxygen:  Oxygen at 4 L/min nasal canula to be used:  Continuously.    Home Health  Aide:  Nursing Weekly, Physical Therapy Three times weekly, Medical Social Work ONCE times weekly, and Home Health Aide Three times weekly    Wound Care Orders  no    I certify that this patient is confined to his home and needs intermittent skilled nursing care and physical therapy.

## 2024-06-30 VITALS
DIASTOLIC BLOOD PRESSURE: 67 MMHG | HEIGHT: 68 IN | TEMPERATURE: 99 F | OXYGEN SATURATION: 97 % | HEART RATE: 75 BPM | SYSTOLIC BLOOD PRESSURE: 109 MMHG | BODY MASS INDEX: 31.07 KG/M2 | WEIGHT: 205 LBS | RESPIRATION RATE: 18 BRPM

## 2024-06-30 LAB
ANION GAP SERPL CALC-SCNC: 10 MMOL/L (ref 8–16)
BUN SERPL-MCNC: 19 MG/DL (ref 8–23)
CALCIUM SERPL-MCNC: 8.8 MG/DL (ref 8.7–10.5)
CHLORIDE SERPL-SCNC: 90 MMOL/L (ref 95–110)
CO2 SERPL-SCNC: 27 MMOL/L (ref 23–29)
CREAT SERPL-MCNC: 1.3 MG/DL (ref 0.5–1.4)
EST. GFR  (NO RACE VARIABLE): 55 ML/MIN/1.73 M^2
GLUCOSE SERPL-MCNC: 307 MG/DL (ref 70–110)
INR PPP: 2.1 (ref 0.8–1.2)
MAGNESIUM SERPL-MCNC: 1.6 MG/DL (ref 1.6–2.6)
POCT GLUCOSE: 367 MG/DL (ref 70–110)
POTASSIUM SERPL-SCNC: 4 MMOL/L (ref 3.5–5.1)
PROTHROMBIN TIME: 22.2 SEC (ref 9–12.5)
SODIUM SERPL-SCNC: 127 MMOL/L (ref 136–145)

## 2024-06-30 PROCEDURE — 63600175 PHARM REV CODE 636 W HCPCS: Performed by: INTERNAL MEDICINE

## 2024-06-30 PROCEDURE — 94640 AIRWAY INHALATION TREATMENT: CPT

## 2024-06-30 PROCEDURE — 99900031 HC PATIENT EDUCATION (STAT)

## 2024-06-30 PROCEDURE — 25000003 PHARM REV CODE 250: Performed by: INTERNAL MEDICINE

## 2024-06-30 PROCEDURE — 99900035 HC TECH TIME PER 15 MIN (STAT)

## 2024-06-30 PROCEDURE — 25000003 PHARM REV CODE 250: Performed by: HOSPITALIST

## 2024-06-30 PROCEDURE — 27000221 HC OXYGEN, UP TO 24 HOURS

## 2024-06-30 PROCEDURE — 85610 PROTHROMBIN TIME: CPT | Performed by: HOSPITALIST

## 2024-06-30 PROCEDURE — 94761 N-INVAS EAR/PLS OXIMETRY MLT: CPT

## 2024-06-30 PROCEDURE — 36415 COLL VENOUS BLD VENIPUNCTURE: CPT | Performed by: HOSPITALIST

## 2024-06-30 PROCEDURE — 25000242 PHARM REV CODE 250 ALT 637 W/ HCPCS: Performed by: INTERNAL MEDICINE

## 2024-06-30 PROCEDURE — 63600175 PHARM REV CODE 636 W HCPCS

## 2024-06-30 RX ORDER — VALSARTAN 40 MG/1
40 TABLET ORAL DAILY
Qty: 30 TABLET | Refills: 2 | Status: SHIPPED | OUTPATIENT
Start: 2024-06-30 | End: 2024-09-28

## 2024-06-30 RX ORDER — MAGNESIUM SULFATE HEPTAHYDRATE 40 MG/ML
2 INJECTION, SOLUTION INTRAVENOUS ONCE
Status: COMPLETED | OUTPATIENT
Start: 2024-06-30 | End: 2024-06-30

## 2024-06-30 RX ADMIN — HYDROCODONE BITARTRATE AND ACETAMINOPHEN 1 TABLET: 7.5; 325 TABLET ORAL at 05:06

## 2024-06-30 RX ADMIN — DOXYCYCLINE HYCLATE 100 MG: 100 TABLET, COATED ORAL at 09:06

## 2024-06-30 RX ADMIN — IPRATROPIUM BROMIDE AND ALBUTEROL SULFATE 3 ML: 2.5; .5 SOLUTION RESPIRATORY (INHALATION) at 02:06

## 2024-06-30 RX ADMIN — MAGNESIUM SULFATE HEPTAHYDRATE 2 G: 40 INJECTION, SOLUTION INTRAVENOUS at 02:06

## 2024-06-30 RX ADMIN — CEFTRIAXONE 1 G: 1 INJECTION, POWDER, FOR SOLUTION INTRAMUSCULAR; INTRAVENOUS at 11:06

## 2024-06-30 RX ADMIN — FUROSEMIDE 40 MG: 40 TABLET ORAL at 09:06

## 2024-06-30 RX ADMIN — PANTOPRAZOLE SODIUM 40 MG: 40 GRANULE, DELAYED RELEASE ORAL at 09:06

## 2024-06-30 RX ADMIN — FLUTICASONE PROPIONATE 50 MCG: 50 SPRAY, METERED NASAL at 09:06

## 2024-06-30 RX ADMIN — DULOXETINE HYDROCHLORIDE 60 MG: 30 CAPSULE, DELAYED RELEASE ORAL at 09:06

## 2024-06-30 RX ADMIN — IPRATROPIUM BROMIDE AND ALBUTEROL SULFATE 3 ML: 2.5; .5 SOLUTION RESPIRATORY (INHALATION) at 08:06

## 2024-06-30 RX ADMIN — GABAPENTIN 900 MG: 300 CAPSULE ORAL at 09:06

## 2024-06-30 RX ADMIN — INSULIN ASPART 15 UNITS: 100 INJECTION, SUSPENSION SUBCUTANEOUS at 09:06

## 2024-06-30 NOTE — NURSING
Received a call from tele [employer], patient had 11 beats vtach, I checked up on the patient he said he feels fine about to sleep, no , sob, lightheadedness, chest pain but just a little bit of a headache he said, I offered tylenol he said he don't want it, Frida Ramirez informed, she ordered BMP and Magnesium.

## 2024-06-30 NOTE — NURSING
[facility]  Nurses Note -- 4 Eyes        Date: 06/30/2024        Skin assessed on: Discharge        [x] No Pressure Injuries Present                 []Prevention Measures Documented     [] Yes LDA Previously documented      [] Yes New Pressure Injury Discovered              [] LDA Added        Attending RN: RADHA Cordoba     Second RN:  KARLA Briggs

## 2024-06-30 NOTE — PROGRESS NOTES
AVS virtually reviewed with patient and daughter in its entirety with emphasis on medications, follow-up appointments and reasons to return to the ED or contact the [facility] On Call Nurse Care Line. Patient also encouraged to utilize their patient portal. Ease and convenience of use reiterated. Education complete and patient voiced understanding. All questions answered. Discharge teaching complete.    [facility]  Report of Consultation    Barb Fee.  Patient Status:  Observation    10/18/1940 MRN [id number]   [facility] 3NE-A Attending Sinai Shah MD   Hosp Day # 0 PCP Brionna Hameed MD     Reason for [facility] of fistula for dialysis   • UPPER GI ENDOSCOPY,EXAM       Family History   Problem Relation Age of Onset   • Hypertension Father       reports that he has never smoked.  He has never used smokeless tobacco. He reports that he does not drink alcohol or use d changes  Denies CP or palpitations  Denies SOB/cough/hemoptysis  Denies abd or flank pain  Denies N/V/D  Denies change in urinary habits or gross hematuria  Denies LE edema  Denies skin rashes/myalgias/arthralgias      Physical Exam:   BP 95/36 (BP Locatio (mg/dL)   Date Value   01/12/2020 39 (H)   05/04/2019 41 (H)   05/03/2019 79 (H)   03/15/2011 60 (H)   03/14/2011 89 (H)   03/13/2011 63 (H)     Blood Urea Nitrogen (mg/dL)   Date Value   10/06/2018 10   01/09/2018 15   08/14/2013 23     Creatinine, Serum

## 2024-06-30 NOTE — PLAN OF CARE
Problem: Adult Inpatient Plan of Care  Goal: Plan of Care Review  Outcome: Adequate for Care Transition  Goal: Patient-Specific Goal (Individualized)  Outcome: Adequate for Care Transition  Goal: Absence of Hospital-Acquired Illness or Injury  Outcome: Adequate for Care Transition  Goal: Optimal Comfort and Wellbeing  Outcome: Adequate for Care Transition  Goal: Readiness for Transition of Care  Outcome: Adequate for Care Transition     Problem: Diabetes Comorbidity  Goal: Blood Glucose Level Within Targeted Range  Outcome: Adequate for Care Transition     Problem: Skin Injury Risk Increased  Goal: Skin Health and Integrity  Outcome: Adequate for Care Transition     Problem: Fall Injury Risk  Goal: Absence of Fall and Fall-Related Injury  Outcome: Adequate for Care Transition     Problem: Sepsis/Septic Shock  Goal: Optimal Coping  Outcome: Adequate for Care Transition  Goal: Absence of Bleeding  Outcome: Adequate for Care Transition  Goal: Blood Glucose Level Within Targeted Range  Outcome: Adequate for Care Transition  Goal: Absence of Infection Signs and Symptoms  Outcome: Adequate for Care Transition  Goal: Optimal Nutrition Intake  Outcome: Adequate for Care Transition     Problem: Pneumonia  Goal: Fluid Balance  Outcome: Adequate for Care Transition  Goal: Resolution of Infection Signs and Symptoms  Outcome: Adequate for Care Transition  Goal: Effective Oxygenation and Ventilation  Outcome: Adequate for Care Transition     Problem: Coping Ineffective  Goal: Effective Coping  Outcome: Adequate for Care Transition

## 2024-06-30 NOTE — NURSING
Discharge instructions handed to pt and daughter at bedside. Both verbalizes understanding. Denies any questions. Both IV's discontinued and catheters intact. Vital signs stable, NADN, and afebrile. Cardiac monitoring dc and tele monitor tech notified. Discharged home via wheelchair with family at bedside.

## 2024-06-30 NOTE — PLAN OF CARE
06/30/24 0818   Final Note   Assessment Type Final Discharge Note   Anticipated Discharge Disposition Home-Health   Hospital Resources/Appts/Education Provided Appointments scheduled and added to AVS;Post-Acute resouces added to AVS   Post-Acute Status   Post-Acute Authorization Home Health   Home Health Status Set-up Complete/Auth obtained     Pts nurse Beryl galindo pt is clear for d/c from CM standpoint

## 2024-06-30 NOTE — NURSING
[facility] [place]  Nurses Note -- 4 Eyes      6/29/2024       Skin assessed on: Q Shift      [x] No Pressure Injuries Present    [x]Prevention Measures Documented    [] Yes LDA  for Pressure Injury Previously documented     [] Yes New Pressure Injury Discovered   [] LDA for New Pressure Injury Added      Attending RN:  Roselia Rivas RN     Second RN:  RADHA Cordoba

## 2024-07-01 PROCEDURE — G0180 MD CERTIFICATION HHA PATIENT: HCPCS | Mod: ,,, | Performed by: INTERNAL MEDICINE

## 2024-07-02 ENCOUNTER — PATIENT OUTREACH (OUTPATIENT)
Dept: ADMINISTRATIVE | Facility: CLINIC | Age: 84
End: 2024-07-02
Payer: MEDICARE

## 2024-07-02 ENCOUNTER — TELEPHONE (OUTPATIENT)
Dept: FAMILY MEDICINE | Facility: CLINIC | Age: 84
End: 2024-07-02
Payer: MEDICARE

## 2024-07-02 NOTE — TELEPHONE ENCOUNTER
----- Message from Steve Hemal sent at 7/2/2024  9:44 AM CDT -----  Regarding: self  Type: Patient Call Back    Who called:self    What is the request in detail:pt has a blister on leg, having jerk movements with his hands. He is unable to hold things because the jerking. Pt is asking to speak     Can the clinic reply by [facility]?no    Would the patient rather a call back or a response via [facility]? callback    Best call back number:[phone]    Additional Information:

## 2024-07-03 ENCOUNTER — OFFICE VISIT (OUTPATIENT)
Dept: INTERNAL MEDICINE | Facility: CLINIC | Age: 84
End: 2024-07-03
Payer: MEDICARE

## 2024-07-03 VITALS
BODY MASS INDEX: 30.3 KG/M2 | SYSTOLIC BLOOD PRESSURE: 118 MMHG | DIASTOLIC BLOOD PRESSURE: 66 MMHG | HEIGHT: 68 IN | WEIGHT: 199.94 LBS | OXYGEN SATURATION: 92 % | HEART RATE: 78 BPM

## 2024-07-03 DIAGNOSIS — L97.909 VENOUS STASIS ULCER, UNSPECIFIED SITE, UNSPECIFIED ULCER STAGE, UNSPECIFIED WHETHER VARICOSE VEINS PRESENT: Primary | ICD-10-CM

## 2024-07-03 DIAGNOSIS — I83.009 VENOUS STASIS ULCER, UNSPECIFIED SITE, UNSPECIFIED ULCER STAGE, UNSPECIFIED WHETHER VARICOSE VEINS PRESENT: Primary | ICD-10-CM

## 2024-07-03 PROCEDURE — 3078F DIAST BP <80 MM HG: CPT | Mod: CPTII,S$GLB,, | Performed by: NURSE PRACTITIONER

## 2024-07-03 PROCEDURE — 1125F AMNT PAIN NOTED PAIN PRSNT: CPT | Mod: CPTII,S$GLB,, | Performed by: NURSE PRACTITIONER

## 2024-07-03 PROCEDURE — 1160F RVW MEDS BY RX/DR IN RCRD: CPT | Mod: CPTII,S$GLB,, | Performed by: NURSE PRACTITIONER

## 2024-07-03 PROCEDURE — 1111F DSCHRG MED/CURRENT MED MERGE: CPT | Mod: CPTII,S$GLB,, | Performed by: NURSE PRACTITIONER

## 2024-07-03 PROCEDURE — 3288F FALL RISK ASSESSMENT DOCD: CPT | Mod: CPTII,S$GLB,, | Performed by: NURSE PRACTITIONER

## 2024-07-03 PROCEDURE — 1100F PTFALLS ASSESS-DOCD GE2>/YR: CPT | Mod: CPTII,S$GLB,, | Performed by: NURSE PRACTITIONER

## 2024-07-03 PROCEDURE — 1157F ADVNC CARE PLAN IN RCRD: CPT | Mod: CPTII,S$GLB,, | Performed by: NURSE PRACTITIONER

## 2024-07-03 PROCEDURE — 99999 PR PBB SHADOW E&M-EST. PATIENT-LVL V: CPT | Mod: PBBFAC,,, | Performed by: NURSE PRACTITIONER

## 2024-07-03 PROCEDURE — 3074F SYST BP LT 130 MM HG: CPT | Mod: CPTII,S$GLB,, | Performed by: NURSE PRACTITIONER

## 2024-07-03 PROCEDURE — 99214 OFFICE O/P EST MOD 30 MIN: CPT | Mod: S$GLB,,, | Performed by: NURSE PRACTITIONER

## 2024-07-03 PROCEDURE — 1159F MED LIST DOCD IN RCRD: CPT | Mod: CPTII,S$GLB,, | Performed by: NURSE PRACTITIONER

## 2024-07-03 NOTE — PROGRESS NOTES
"Subjective     Patient ID: Percy Ramirez is a 83 y.o. male.  /66 (BP Location: Right arm, Patient Position: Sitting)   Pulse 78   Ht 5' 8" (1.727 m)   Wt 90.7 kg (199 lb 15.3 oz)   SpO2 (!) 92%   BMI 30.40 kg/m²      Chief Complaint: sore    Presenting for wound to R shin. Patient is accompanied by family/caregivers today. HPI provided by caregivers. Caregivers state they are unsure when the wound first appeared but they noticed during a recent hospitalization where patient was being treated for pneumonia. The wound broke open yesterday which prompted them to bring the patient to clinic today. Patient is currently on po doxy for pneumonia.         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    Coronary artery disease    S/P CABG x 2    Macular degeneration    Obstructive sleep apnea treated with BiPAP    Anxiety state, unspecified    Insulin long-term use    Primary osteoarthritis of both knees    Chronic low back pain    DJD (degenerative joint disease), lumbar    Poorly controlled type 2 diabetes mellitus with neuropathy    Bilateral carotid artery disease    Hyperlipidemia LDL goal <100    Type 2 diabetes, with peripheral circulatory disorder not at goal    Atherosclerosis of abdominal aorta    Overweight (BMI 25.0-29.9)    Persistent atrial fibrillation    Chronic stable angina    Senile purpura    Goals of care, counseling/discussion    Osteoarthritis of carpometacarpal (CMC) joint of left thumb    MCI (mild cognitive impairment)    Dizziness and giddiness    Pulmonary nodule    Dyspnea on exertion    Chronic combined systolic and diastolic heart failure    Walker as ambulation aid    ILD (interstitial lung disease)    History of cardioversion    Erectile dysfunction    Secondary hyperparathyroidism of renal origin    " "Noncompliance with medication regimen    Exudative age-related macular degeneration, right eye, with active choroidal neovascularization    History of stroke    Chronic tension-type headache, intractable    Spinal stenosis of lumbar region with neurogenic claudication    Lumbar radiculopathy    Lumbar spondylosis    DDD (degenerative disc disease), lumbar    Cerebral atherosclerosis    Chronic kidney disease, stage 3a    Ear pressure, bilateral    Pneumonia of right lower lobe due to infectious organism    Dysphagia    Advance care planning        Current Outpatient Medications   Medication Sig Dispense Refill    acetaminophen (TYLENOL) 325 MG tablet Take 2 tablets (650 mg total) by mouth every 6 (six) hours as needed. 13 tablet 0    albuterol (PROVENTIL) 2.5 mg /3 mL (0.083 %) nebulizer solution Take by nebulization.      albuterol (PROVENTIL/VENTOLIN HFA) 90 mcg/actuation inhaler INHALE 2 PUFFS BY MOUTH EVERY 6 HOURS AS NEEDED FOR WHEEZING OR  SHORTNESS  OF  BREATH 8 g 3    albuterol sulfate 2.5 mg/0.5 mL Nebu Take 2.5 mg by nebulization every 6 (six) hours while awake. Rescue 90 each 1    AREXVY, PF, 120 mcg/0.5 mL SusR vaccine       atorvastatin (LIPITOR) 40 MG tablet Take 40 mg by mouth once daily.      BD INSULIN PEN NEEDLE UF SHORT 31 gauge x 5/16" Ndle       BD INSULIN SYRINGE ULTRA-FINE 1/2 mL 31 gauge x 15/64" Syrg       blood sugar diagnostic Strp To check BG 3 times daily, to use with insurance preferred meter 200 strip 11    carvediloL (COREG) 3.125 MG tablet Take 1 tablet (3.125 mg total) by mouth 2 (two) times daily with meals. 60 tablet 2    cefdinir (OMNICEF) 300 MG capsule Take 1 capsule (300 mg total) by mouth 2 (two) times daily. for 7 days 14 capsule 0    ceramides 1,3,6-II (CERAVE DAILY MOISTURIZING) Lotn Apply twice daily to skin 355 mL 1    cinnamon bark 500 mg capsule Take 1,000 mg by mouth once daily.        clopidogreL (PLAVIX) 75 mg tablet Take 75 mg by mouth.      diclofenac sodium " "(VOLTAREN) 1 % Gel Apply 2 g topically 2 (two) times daily. 100 g 0    diclofenac sodium (VOLTAREN) 1 % Gel Apply 2 g topically 3 (three) times daily. 50 g 0    doxycycline (VIBRA-TABS) 100 MG tablet Take 1 tablet (100 mg total) by mouth every 12 (twelve) hours. for 7 days 14 tablet 0    DULoxetine (CYMBALTA) 60 MG capsule Take 1 capsule (60 mg total) by mouth once daily. 90 capsule 3    fluticasone propionate (FLONASE) 50 mcg/actuation nasal spray 1 spray (50 mcg total) by Each Nostril route 2 (two) times daily. 18.2 mL 3    furosemide (LASIX) 40 MG tablet Take 40 mg by mouth once daily.      gabapentin (NEURONTIN) 300 MG capsule Take 4 capsules (1,200 mg total) by mouth 2 (two) times daily. 540 capsule 1    glimepiride (AMARYL) 4 MG tablet Take 4 mg by mouth daily with breakfast.       HYDROcodone-acetaminophen (NORCO) 7.5-325 mg per tablet Take 1 tablet by mouth every 8 (eight) hours as needed for Pain. 90 tablet 0    insulin NPH-insulin regular, 70/30, (NOVOLIN 70/30) 100 unit/mL (70-30) injection Inject into the skin. Inject 10 units at breakfast and inject 10 units at night      insulin syringe-needle U-100 0.3 mL 31 gauge x 15/64" Syrg USE TO INJECT INSULIN THREE TIMES DAILY      insulin syringe-needle U-100 1/2 mL 31 x 5/16" Syrg       ipratropium (ATROVENT) 42 mcg (0.06 %) nasal spray 2 sprays by Nasal route every 6 (six) hours as needed for Rhinitis (use as needed for runny nose and also use 15 minutes prior to meal). Clean nose with saline prior to use 15 mL 3    isosorbide mononitrate (IMDUR) 30 MG 24 hr tablet Take 30 mg by mouth once daily.      lancets Misc To check BG 3 times daily, to use with insurance preferred meter 200 each 11    LIDOcaine (LIDODERM) 5 % Place 1 patch onto the skin once daily. Remove & Discard patch within 12 hours or as directed by MD 5 patch 1    meclizine (ANTIVERT) 12.5 mg tablet Take 1 tablet (12.5 mg total) by mouth 3 (three) times daily as needed for Dizziness. 90 tablet " 0    metFORMIN (GLUCOPHAGE) 500 MG tablet Take 500 mg by mouth 2 (two) times daily.      methocarbamoL (ROBAXIN) 500 MG Tab TAKE 1 TABLET BY MOUTH 4 TIMES DAILY FOR 10 DAYS 60 tablet 0    mupirocin (BACTROBAN) 2 % ointment Apply topically 3 (three) times daily. 1 g 1    nitroGLYCERIN (NITROSTAT) 0.4 MG SL tablet DISSOLVE 1 TABLET UNDER THE TONGUE EVERY 5 MINUTES AS  NEEDED FOR CHEST PAIN. MAX  OF 3 TABLETS IN 15 MINUTES. CALL 911 IF PAIN PERSISTS.      omeprazole (PRILOSEC) 20 MG capsule Take 1 capsule (20 mg total) by mouth once daily. 30 capsule 2    pravastatin (PRAVACHOL) 20 MG tablet Take 1 tablet (20 mg total) by mouth once daily. 90 tablet 1    spironolactone (ALDACTONE) 25 MG tablet Take 0.5 tablets (12.5 mg total) by mouth once daily.      tiotropium-olodateroL (STIOLTO RESPIMAT) 2.5-2.5 mcg/actuation Mist Inhale 2 puffs into the lungs once daily. Controller 1 g 11    triamcinolone acetonide 0.1% (KENALOG) 0.1 % cream SMARTSI Application Topical 2-3 Times Daily      valsartan (DIOVAN) 40 MG tablet Take 1 tablet (40 mg total) by mouth once daily. 30 tablet 2    valsartan (DIOVAN) 80 MG tablet Take 0.5 tablets (40 mg total) by mouth once daily.      VIT C/VIT E AC/LUT/COPPER/ZINC (PRESERVISION LUTEIN ORAL) Take by mouth.      warfarin (COUMADIN) 5 MG tablet Take 2 tabs by mouth on Tuesday,Thursday, Saturday and Sundays then 1.5 on all other days.       No current facility-administered medications for this visit.        Review of Systems   Integumentary:  Positive for wound.   All other systems reviewed and are negative.         Objective     Physical Exam  Constitutional:       General: He is not in acute distress.     Appearance: Normal appearance. He is not ill-appearing, toxic-appearing or diaphoretic.   HENT:      Head: Normocephalic and atraumatic.   Cardiovascular:      Rate and Rhythm: Normal rate and regular rhythm.   Pulmonary:      Effort: Pulmonary effort is normal.   Abdominal:      General:  Abdomen is flat.   Musculoskeletal:      Cervical back: Normal range of motion.      Right lower leg: Edema (1+) present.      Left lower leg: Edema (1+) present.   Skin:     General: Skin is warm and dry.      Findings: Lesion present.      Comments: Changes to skin of bilateral lower extremities, consistent with chronic venous stasis. 2cm ulceration with eschar over R lower shin, draining serous fluid, without signs of infection    Neurological:      General: No focal deficit present.      Mental Status: He is alert and oriented to person, place, and time.   Psychiatric:         Mood and Affect: Mood normal.         Behavior: Behavior normal.          Assessment and Plan     1. Venous stasis ulcer, unspecified site, unspecified ulcer stage, unspecified whether varicose veins present; Presentation consistent with venous stasis ulcer. Will refer to wound clinic and Vascular for further evaluation/treatment. Patient currently on po doxy for treatment of pneumonia; this will help to prevent infection of ulcer. Instructed to complete abx and to keep wound clean and covered   -     Ambulatory referral/consult to Wound Clinic; Future; Expected date: 07/10/2024  -     Ambulatory referral/consult to Vascular Cardiology; Future; Expected date: 07/10/2024          Steve King NP   Internal Medicine           Follow up if symptoms worsen or fail to improve.

## 2024-07-08 ENCOUNTER — PATIENT OUTREACH (OUTPATIENT)
Dept: ADMINISTRATIVE | Facility: OTHER | Age: 84
End: 2024-07-08
Payer: MEDICARE

## 2024-07-08 NOTE — PROGRESS NOTES
CHW - Case Closure    This Community Health Worker spoke to patient via telephone today.   Pt reported: Patient states the only need he has is transportation. This CHW mailed information for transportation from current insurance coverage. Patient has graduated and agreed to case closure.   Pt denied any additional needs at this time and agrees with episode closure at this time.  Provided patient with Community Health Worker's contact information and encouraged him to contact this Community Health Worker if additional needs arise.

## 2024-07-17 ENCOUNTER — TELEPHONE (OUTPATIENT)
Dept: PULMONOLOGY | Facility: CLINIC | Age: 84
End: 2024-07-17
Payer: MEDICARE

## 2024-07-17 ENCOUNTER — OFFICE VISIT (OUTPATIENT)
Dept: FAMILY MEDICINE | Facility: CLINIC | Age: 84
End: 2024-07-17
Payer: MEDICARE

## 2024-07-17 VITALS
DIASTOLIC BLOOD PRESSURE: 52 MMHG | SYSTOLIC BLOOD PRESSURE: 96 MMHG | BODY MASS INDEX: 29.29 KG/M2 | HEIGHT: 68 IN | TEMPERATURE: 98 F | OXYGEN SATURATION: 89 % | HEART RATE: 72 BPM | WEIGHT: 193.25 LBS

## 2024-07-17 DIAGNOSIS — I50.42 CHRONIC COMBINED SYSTOLIC AND DIASTOLIC HEART FAILURE: ICD-10-CM

## 2024-07-17 DIAGNOSIS — E11.319 POORLY CONTROLLED TYPE 2 DIABETES MELLITUS WITH RETINOPATHY: ICD-10-CM

## 2024-07-17 DIAGNOSIS — M65.30 TRIGGER FINGER, UNSPECIFIED FINGER, UNSPECIFIED LATERALITY: ICD-10-CM

## 2024-07-17 DIAGNOSIS — I48.19 PERSISTENT ATRIAL FIBRILLATION: ICD-10-CM

## 2024-07-17 DIAGNOSIS — E11.65 POORLY CONTROLLED TYPE 2 DIABETES MELLITUS WITH NEUROPATHY: ICD-10-CM

## 2024-07-17 DIAGNOSIS — J84.9 ILD (INTERSTITIAL LUNG DISEASE): ICD-10-CM

## 2024-07-17 DIAGNOSIS — E11.51 TYPE 2 DIABETES, WITH PERIPHERAL CIRCULATORY DISORDER NOT AT GOAL: ICD-10-CM

## 2024-07-17 DIAGNOSIS — J18.9 PNEUMONIA DUE TO INFECTIOUS ORGANISM, UNSPECIFIED LATERALITY, UNSPECIFIED PART OF LUNG: ICD-10-CM

## 2024-07-17 DIAGNOSIS — E11.65 POORLY CONTROLLED TYPE 2 DIABETES MELLITUS WITH RETINOPATHY: ICD-10-CM

## 2024-07-17 DIAGNOSIS — E11.40 POORLY CONTROLLED TYPE 2 DIABETES MELLITUS WITH NEUROPATHY: ICD-10-CM

## 2024-07-17 DIAGNOSIS — Z09 HOSPITAL DISCHARGE FOLLOW-UP: Primary | ICD-10-CM

## 2024-07-17 DIAGNOSIS — R25.1 TREMOR: ICD-10-CM

## 2024-07-17 PROCEDURE — 3078F DIAST BP <80 MM HG: CPT | Mod: CPTII,S$GLB,, | Performed by: INTERNAL MEDICINE

## 2024-07-17 PROCEDURE — 3288F FALL RISK ASSESSMENT DOCD: CPT | Mod: CPTII,S$GLB,, | Performed by: INTERNAL MEDICINE

## 2024-07-17 PROCEDURE — 1100F PTFALLS ASSESS-DOCD GE2>/YR: CPT | Mod: CPTII,S$GLB,, | Performed by: INTERNAL MEDICINE

## 2024-07-17 PROCEDURE — 1125F AMNT PAIN NOTED PAIN PRSNT: CPT | Mod: CPTII,S$GLB,, | Performed by: INTERNAL MEDICINE

## 2024-07-17 PROCEDURE — 99999 PR PBB SHADOW E&M-EST. PATIENT-LVL V: CPT | Mod: PBBFAC,,, | Performed by: INTERNAL MEDICINE

## 2024-07-17 PROCEDURE — 1159F MED LIST DOCD IN RCRD: CPT | Mod: CPTII,S$GLB,, | Performed by: INTERNAL MEDICINE

## 2024-07-17 PROCEDURE — 1111F DSCHRG MED/CURRENT MED MERGE: CPT | Mod: CPTII,S$GLB,, | Performed by: INTERNAL MEDICINE

## 2024-07-17 PROCEDURE — 3074F SYST BP LT 130 MM HG: CPT | Mod: CPTII,S$GLB,, | Performed by: INTERNAL MEDICINE

## 2024-07-17 PROCEDURE — 1157F ADVNC CARE PLAN IN RCRD: CPT | Mod: CPTII,S$GLB,, | Performed by: INTERNAL MEDICINE

## 2024-07-17 PROCEDURE — 99214 OFFICE O/P EST MOD 30 MIN: CPT | Mod: S$GLB,,, | Performed by: INTERNAL MEDICINE

## 2024-07-17 NOTE — PROGRESS NOTES
CHIEF COMPLAINT:   Chief Complaint   Patient presents with    Hospital Follow Up          HISTORY OF PRESENT ILLNESS:  Peryc Ramirez is a 83 y.o. male who presents to the clinic today for Hospital Follow Up  .      The patient presents to clinic today for follow-up of a hospitalization about 3 weeks ago.  He was admitted for fever, weakness, and chills.  He was treated empirically for right lower lobe pneumonia.  They were concerned that he might be aspirating, but this was not confirmed.  He also had an echocardiogram that did not show any acute worsening of his chronic congestive heart failure.  He is followed by Cardiology at [facility], Dr. Lopez.  He reports that he completed his antibiotics.  He is back to baseline.  He is normally on 4 L of oxygen at home which keeps his oxygen levels in the upper 80s.  Primary complaints today are the fact that he has occasional tremors/jerking movements.  This is not anything new.  He is concerned about etiology and prognosis.  He has also seen orthopedics because he has trigger finger.  Orthopedics is willing to do a procedure under local anesthesia.  He is wondering if he is a candidate for this.    His daughter accompanies him today at his office visit.  She is trying to be more involved in helping to make sure he takes his medication.    Subjective    PAST MEDICAL HISTORY:  Past Medical History:   Diagnosis Date    Allergy     Arthritis     CAD, multiple vessel     DR Melissa    Cataract     Depression     History of colon polyps     Hyperlipidemia     Hypertension     Macular degeneration     Dr Razo for injectionJennifer for eye    S/P CABG x 2     Type 2 diabetes mellitus with circulatory disorder     Dr. Aquino       PAST SURGICAL HISTORY:  Past Surgical History:   Procedure Laterality Date    broken elbow      left    COLONOSCOPY N/A 10/4/2017    Procedure: COLONOSCOPY;  Surgeon: Gabriel Leung MD;  Location: [facility];  Service: Endoscopy;  Laterality:  N/A;    EYE SURGERY      cataract    heart bypass      2004    HERNIA REPAIR      right    ROTATOR CUFF REPAIR      right  2004       SOCIAL HISTORY:  Social History     Socioeconomic History    Marital status:     Number of children: 4    Years of education: GED   Occupational History    Occupation: retired tug    Tobacco Use    Smoking status: Former     Current packs/day: 0.00     Average packs/day: 3.0 packs/day for 45.0 years (135.0 ttl pk-yrs)     Types: Cigarettes     Start date: 1959     Quit date: 2004     Years since quittin.2    Smokeless tobacco: Former   Substance and Sexual Activity    Alcohol use: Yes     Comment: was heavy drinker 35 years ago, rare use now    Drug use: No    Sexual activity: Yes     Partners: Female     Social Determinants of Health     Financial Resource Strain: Low Risk  (2024)    Overall Financial Resource Strain (CARDIA)     Difficulty of Paying Living Expenses: Not hard at all   Food Insecurity: No Food Insecurity (2024)    Hunger Vital Sign     Worried About Running Out of Food in the Last Year: Never true     Ran Out of Food in the Last Year: Never true   Transportation Needs: No Transportation Needs (2024)    PRAPARE - Transportation     Lack of Transportation (Medical): No     Lack of Transportation (Non-Medical): No   Physical Activity: Inactive (2024)    Exercise Vital Sign     Days of Exercise per Week: 0 days     Minutes of Exercise per Session: 0 min   Stress: No Stress Concern Present (2024)    [employer] - Occupational Stress Questionnaire     Feeling of Stress : Not at all   Housing Stability: Low Risk  (2024)    Housing Stability Vital Sign     Unable to Pay for Housing in the Last Year: No     Homeless in the Last Year: No       FAMILY HISTORY:  Family History   Problem Relation Name Age of Onset    Arthritis Mother      Diabetes Mother      Stroke Mother      Heart attack  "Father      Cancer Brother      Throat cancer Brother      Diabetes Maternal Aunt      Diabetes Maternal Uncle      Heart disease Maternal Uncle      Diabetes Paternal Aunt      Heart disease Paternal Aunt      Heart disease Paternal Uncle      Heart disease Maternal Grandmother      Cancer Maternal Grandfather         ALLERGIES AND MEDICATIONS: updated and reviewed.  Review of patient's allergies indicates:   Allergen Reactions    Aricept odt [donepezil] Diarrhea and Nausea And Vomiting    Codeine Nausea Only     weak    Ranexa [ranolazine]      Medication List with Changes/Refills   Current Medications    ACETAMINOPHEN (TYLENOL) 325 MG TABLET    Take 2 tablets (650 mg total) by mouth every 6 (six) hours as needed.    ALBUTEROL (PROVENTIL) 2.5 MG /3 ML (0.083 %) NEBULIZER SOLUTION    Take by nebulization.    ALBUTEROL (PROVENTIL/VENTOLIN HFA) 90 MCG/ACTUATION INHALER    INHALE 2 PUFFS BY MOUTH EVERY 6 HOURS AS NEEDED FOR WHEEZING OR  SHORTNESS  OF  BREATH    ALBUTEROL SULFATE 2.5 MG/0.5 ML NEBU    Take 2.5 mg by nebulization every 6 (six) hours while awake. Rescue    ATORVASTATIN (LIPITOR) 40 MG TABLET    Take 40 mg by mouth once daily.    BD INSULIN PEN NEEDLE UF SHORT 31 GAUGE X 5/16" NDLE        BD INSULIN SYRINGE ULTRA-FINE 1/2 ML 31 GAUGE X 15/64" SYRG        BLOOD SUGAR DIAGNOSTIC STRP    To check BG 3 times daily, to use with insurance preferred meter    CARVEDILOL (COREG) 3.125 MG TABLET    Take 1 tablet (3.125 mg total) by mouth 2 (two) times daily with meals.    CERAMIDES 1,3,6-II (CERAVE DAILY MOISTURIZING) LOTN    Apply twice daily to skin    CINNAMON BARK 500 MG CAPSULE    Take 1,000 mg by mouth once daily.      CLOPIDOGREL (PLAVIX) 75 MG TABLET    Take 75 mg by mouth.    DICLOFENAC SODIUM (VOLTAREN) 1 % GEL    Apply 2 g topically 3 (three) times daily.    DULOXETINE (CYMBALTA) 60 MG CAPSULE    Take 1 capsule (60 mg total) by mouth once daily.    FLUTICASONE PROPIONATE (FLONASE) 50 MCG/ACTUATION NASAL " "SPRAY    1 spray (50 mcg total) by Each Nostril route 2 (two) times daily.    FUROSEMIDE (LASIX) 40 MG TABLET    Take 40 mg by mouth once daily.    GABAPENTIN (NEURONTIN) 300 MG CAPSULE    Take 4 capsules (1,200 mg total) by mouth 2 (two) times daily.    GLIMEPIRIDE (AMARYL) 4 MG TABLET    Take 4 mg by mouth daily with breakfast.     HYDROCODONE-ACETAMINOPHEN (NORCO) 7.5-325 MG PER TABLET    Take 1 tablet by mouth every 8 (eight) hours as needed for Pain.    INSULIN NPH-INSULIN REGULAR, 70/30, (NOVOLIN 70/30) 100 UNIT/ML (70-30) INJECTION    Inject into the skin. Inject 10 units at breakfast and inject 10 units at night    INSULIN SYRINGE-NEEDLE U-100 0.3 ML 31 GAUGE X 15/64" SYRG    USE TO INJECT INSULIN THREE TIMES DAILY    INSULIN SYRINGE-NEEDLE U-100 1/2 ML 31 X 5/16" SYRG        IPRATROPIUM (ATROVENT) 42 MCG (0.06 %) NASAL SPRAY    2 sprays by Nasal route every 6 (six) hours as needed for Rhinitis (use as needed for runny nose and also use 15 minutes prior to meal). Clean nose with saline prior to use    ISOSORBIDE MONONITRATE (IMDUR) 30 MG 24 HR TABLET    Take 30 mg by mouth once daily.    LANCETS MISC    To check BG 3 times daily, to use with insurance preferred meter    LIDOCAINE (LIDODERM) 5 %    Place 1 patch onto the skin once daily. Remove & Discard patch within 12 hours or as directed by MD    MECLIZINE (ANTIVERT) 12.5 MG TABLET    Take 1 tablet (12.5 mg total) by mouth 3 (three) times daily as needed for Dizziness.    METHOCARBAMOL (ROBAXIN) 500 MG TAB    TAKE 1 TABLET BY MOUTH 4 TIMES DAILY FOR 10 DAYS    MUPIROCIN (BACTROBAN) 2 % OINTMENT    Apply topically 3 (three) times daily.    NITROGLYCERIN (NITROSTAT) 0.4 MG SL TABLET    DISSOLVE 1 TABLET UNDER THE TONGUE EVERY 5 MINUTES AS  NEEDED FOR CHEST PAIN. MAX  OF 3 TABLETS IN 15 MINUTES. CALL 911 IF PAIN PERSISTS.    OMEPRAZOLE (PRILOSEC) 20 MG CAPSULE    Take 1 capsule (20 mg total) by mouth once daily.    PRAVASTATIN (PRAVACHOL) 20 MG TABLET    Take " 1 tablet (20 mg total) by mouth once daily.    SPIRONOLACTONE (ALDACTONE) 25 MG TABLET    Take 0.5 tablets (12.5 mg total) by mouth once daily.    TIOTROPIUM-OLODATEROL (STIOLTO RESPIMAT) 2.5-2.5 MCG/ACTUATION MIST    Inhale 2 puffs into the lungs once daily. Controller    TRIAMCINOLONE ACETONIDE 0.1% (KENALOG) 0.1 % CREAM    SMARTSI Application Topical 2-3 Times Daily    VALSARTAN (DIOVAN) 40 MG TABLET    Take 1 tablet (40 mg total) by mouth once daily.    VIT C/VIT E AC/LUT/COPPER/ZINC (PRESERVISION LUTEIN ORAL)    Take by mouth.    WARFARIN (COUMADIN) 5 MG TABLET    Take 2 tabs by mouth on Tuesday,Thursday, Saturday and Sundays then 1.5 on all other days.   Discontinued Medications    AREXVY, PF, 120 MCG/0.5 ML SUSR VACCINE        CEFDINIR (OMNICEF) 300 MG CAPSULE    Take 1 capsule (300 mg total) by mouth 2 (two) times daily. for 7 days    DICLOFENAC SODIUM (VOLTAREN) 1 % GEL    Apply 2 g topically 2 (two) times daily.    DOXYCYCLINE (VIBRA-TABS) 100 MG TABLET    Take 1 tablet (100 mg total) by mouth every 12 (twelve) hours. for 7 days    METFORMIN (GLUCOPHAGE) 500 MG TABLET    Take 500 mg by mouth 2 (two) times daily.    VALSARTAN (DIOVAN) 80 MG TABLET    Take 0.5 tablets (40 mg total) by mouth once daily.         CARE TEAM:  Patient Care Team:  Kasie Edmondson MD as PCP - General (Internal Medicine)  Jac Rodriguez OD as Consulting Provider (Optometry)  Trever Arevalo MD as Consulting Physician (Ophthalmology)  Philippe Aquino MD as Consulting Physician (Endocrinology)  Mac Valderrama MD as Consulting Physician (Cardiology)  Marifer Romero DPM as Consulting Physician (Podiatry)  Remedios Madison LPN as Care Coordinator  Lilian Carrion as ED Navigator       REVIEW OF SYSTEMS:  Review of Systems   Constitutional:  Negative for chills and fever.   Respiratory:  Negative for cough.    Cardiovascular:  Negative for chest pain.   Gastrointestinal:  Negative for abdominal pain.  "  Musculoskeletal:  Positive for arthralgias and gait problem.              Objective    PHYSICAL EXAMINATION/VITALS:  Vitals:    07/17/24 1455 07/17/24 1502   BP: (!) 96/52    Pulse: 72    Temp: 98 °F (36.7 °C)    TempSrc: Oral    SpO2: (!) 84%  Comment: on RA (!) 89%  Comment: on 3 L BN   Weight: 87.6 kg (193 lb 3.7 oz)    Height: 5' 8" (1.727 m)        Body mass index is 29.38 kg/m².      General appearance - alert and in no distress, overweight, pleasant elderly male  Psychiatric - alert, oriented to person, place, and time, normal behavior, speech, dress, motor activity and thought process  Chest - clear to auscultation, no wheezes, rales or rhonchi, symmetric air entry  Heart - irregularly irregular rhythm with rate controlled   Neurological - alert, normal speech, no focal findings; cranial nerves II through XII intact  Musculoskeletal - not examined; he walked with a rolling walker although he was not able to walk for very long and needed to be pushed while seated.        LABS:  Lab Results   Component Value Date    HGBA1C 9.8 (H) 03/18/2024    HGBA1C 10.7 (H) 12/20/2023    HGBA1C 8.3 (H) 09/19/2023      Lab Results   Component Value Date    CHOL 98 (L) 10/22/2013     Lab Results   Component Value Date    LDLCALC 56 12/20/2023    LDLCALC 45 09/19/2023    LDLCALC 40 06/19/2023               ASSESSMENT AND PLAN:   1. Hospital discharge follow-up/2. Pneumonia due to infectious organism, unspecified laterality, unspecified part of lung  Resolved.  Antibiotics completed.    3. Type 2 diabetes, with peripheral circulatory disorder not at goal/4. Poorly controlled type 2 diabetes mellitus with neuropathy/5. 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  He will follow-up at his earliest convenience.  He reports he has made some dietary changes since he got home and he has decreased his intake of potato chips and " cookies.  Overview:  Followed by endocrinology, Dr. Aquino at       6. Chronic combined systolic and diastolic heart failure  He will follow-up with Cardiology in September.  He is not sure if he should be taking Imdur.  Blood pressures today are at the low end of normal although he is not symptomatic.  I discussed with his daughter that Imdur is also used for controlling chronic angina.  Since they are not seeing Cardiology until September I advised her to contact them to see if he should be taking the medication or not. He was taken off the Entresto during his recent hospitalization  Overview:  -EF 30-40%  -followed by Dr. Valderrama.        7. Persistent atrial fibrillation  Rate controlled.  Continue current regimen.  Overview:  - on coumadin  - followed by [facility] [place]      8. ILD (interstitial lung disease)  The current medical regimen is effective;  continue present plan and medications.   Followed by: Pulmonology.   Overview:  -Emphysematous changes on ct along with basilar reticulation.  pft with moderate obstructive and mild restrictive physiology.  dlco significantly reduced (pt on home oxygen)  -Monitor lung function stable fvc and tlc      9. Trigger finger, unspecified finger, unspecified laterality  I discussed with the patient that in order to clear him for any type of procedure under local anesthesia he would need to have cardiology clearance as well as have an A1c less than 8 in order to prevent postoperative infection.  He voiced understanding.    10. Tremor  I discussed with the patient he most likely has benign essential tremor.  There is no cure for this.  He is not a candidate for any medications that might decrease the tremors.  He will adjust his ADLs accordingly.            No orders of the defined types were placed in this encounter.     FOLLOW UP: Follow up in about 4 months (around 11/17/2024) for follow up chronic medical conditions.. or sooner as needed.

## 2024-07-17 NOTE — TELEPHONE ENCOUNTER
Spoke with patient told him yes he must use the inhalers the doctor gave him to feel better.                ----- Message from Mor Li MA sent at 7/17/2024 11:32 AM CDT -----  Shak[facility] Granados Staff  Caller: Unspecified (Today, 11:06 AM)    Type: Patient Call Back     Who called:     What is the request in detail:pt is stating he was recently  in the hospital and when discharged he was given 3 types of albuterol medication. He is asking if he is to continue taking all 3. Please call to advise     Can the clinic reply by [facility]? No     Would the patient rather a call back or a response via [facility]? Call back     Best call back number: [phone]       Additional Information:     Thank you.

## 2024-07-24 ENCOUNTER — TELEPHONE (OUTPATIENT)
Dept: FAMILY MEDICINE | Facility: CLINIC | Age: 84
End: 2024-07-24
Payer: MEDICARE

## 2024-07-24 NOTE — TELEPHONE ENCOUNTER
----- Message from Donna Gillis sent at 7/24/2024  1:36 PM CDT -----  Regarding: patient call back  Type: Patient Call Back    Who called: Self     What is the request in detail: asked if can have the psifer vaccine with him having puenommia     Can the clinic reply by [facility]? No     Would the patient rather a call back or a response via [facility]? Call     Best call back number: .[phone]

## 2024-07-30 ENCOUNTER — TELEPHONE (OUTPATIENT)
Dept: FAMILY MEDICINE | Facility: CLINIC | Age: 84
End: 2024-07-30
Payer: MEDICARE

## 2024-07-30 NOTE — TELEPHONE ENCOUNTER
----- Message from Steve Recio sent at 7/30/2024  1:18 PM CDT -----  Type: Patient Call Back    Who called:self    What is the request in detail pt is calling because his acid reflux medication is not working and would like to speak to the office     Can the clinic reply by [facility]?no    Would the patient rather a call back or a response via [facility]? callback    Best call back number:[phone]    Additional Information:

## 2024-07-31 ENCOUNTER — TELEPHONE (OUTPATIENT)
Dept: FAMILY MEDICINE | Facility: CLINIC | Age: 84
End: 2024-07-31
Payer: MEDICARE

## 2024-07-31 DIAGNOSIS — K21.9 GASTROESOPHAGEAL REFLUX DISEASE, UNSPECIFIED WHETHER ESOPHAGITIS PRESENT: ICD-10-CM

## 2024-07-31 DIAGNOSIS — R13.10 DYSPHAGIA, UNSPECIFIED TYPE: Primary | ICD-10-CM

## 2024-07-31 NOTE — TELEPHONE ENCOUNTER
Patient states that he is currently omeprazole 20 mg. Patient states he is taking it in the morning with the rest of his routine pills around 7/8 in the morning. Patient states he does not have any caffeine,chocolate, or peppermints. Patient states that in the morning he is ok. Patient states he has heartburn if he eats around 8/9 pm. Patient states he feels the heartburn after one or two hours after eating in the evening. Patient states that he has never seen a specialist for his concern.

## 2024-07-31 NOTE — TELEPHONE ENCOUNTER
----- Message from Donna Gillis sent at 7/31/2024  2:24 PM CDT -----  Regarding: patient call back  Type: Patient Call Back    Who called: Self     What is the request in detail: said that his acid reflux medicine is not working. He's taking tums around afternoon.      Can the clinic reply by [facility]? No     Would the patient rather a call back or a response via [facility]? Call     Best call back number: .[phone]

## 2024-07-31 NOTE — TELEPHONE ENCOUNTER
Which medication is he currently taking and at what time of the day?  How much caffeine, chocolate, or red and white peppermints is he eating daily?    How soon after eating as he lying down?  He was referred in 2022 to gastroenterology.  Did he ever see them?

## 2024-08-02 DIAGNOSIS — M54.16 LUMBAR RADICULOPATHY: ICD-10-CM

## 2024-08-02 DIAGNOSIS — M47.816 LUMBAR SPONDYLOSIS: ICD-10-CM

## 2024-08-02 DIAGNOSIS — M48.062 SPINAL STENOSIS OF LUMBAR REGION WITH NEUROGENIC CLAUDICATION: ICD-10-CM

## 2024-08-02 DIAGNOSIS — M51.36 DDD (DEGENERATIVE DISC DISEASE), LUMBAR: ICD-10-CM

## 2024-08-02 RX ORDER — HYDROCODONE BITARTRATE AND ACETAMINOPHEN 7.5; 325 MG/1; MG/1
1 TABLET ORAL EVERY 8 HOURS PRN
Qty: 90 TABLET | Refills: 0 | Status: SHIPPED | OUTPATIENT
Start: 2024-08-04 | End: 2024-09-03

## 2024-08-02 NOTE — TELEPHONE ENCOUNTER
Spoke with pt stating we received the pt's refill request and it should be sent to the pharmacy once Dr. Santos signs the order likely Monday morning. Pt verbalized understanding.      ----- Message from Donna Gillis sent at 8/2/2024 11:31 AM CDT -----  Regarding: Refill Request  Type: RX Refill Request      Who Called: Self       Refill or New Rx: refill       RX Name and Strength: hydrocodone       Preferred Pharmacy with phone number:   [id number] - [place], [place]  [facility]    Would the patient rather a call back or a response via [facility]? Call       Best Call Back Number: .[phone]

## 2024-08-08 ENCOUNTER — TELEPHONE (OUTPATIENT)
Dept: PULMONOLOGY | Facility: CLINIC | Age: 84
End: 2024-08-08
Payer: MEDICARE

## 2024-08-09 ENCOUNTER — TELEPHONE (OUTPATIENT)
Dept: PULMONOLOGY | Facility: CLINIC | Age: 84
End: 2024-08-09
Payer: MEDICARE

## 2024-08-13 LAB
EGFR: 40 ML/MIN/1.73M2
HBA1C MFR BLD: 9.7 % (ref 4–5.6)

## 2024-08-13 RX ORDER — ALBUTEROL SULFATE 0.83 MG/ML
2.5 SOLUTION RESPIRATORY (INHALATION)
Qty: 100 ML | Refills: 3 | Status: SHIPPED | OUTPATIENT
Start: 2024-08-13

## 2024-08-13 NOTE — TELEPHONE ENCOUNTER
Message sent to Dr. Alexandre.    JEANMARIE Juares  Pulm/Sleep [facility]  [phone]

## 2024-08-20 ENCOUNTER — TELEPHONE (OUTPATIENT)
Dept: FAMILY MEDICINE | Facility: CLINIC | Age: 84
End: 2024-08-20
Payer: MEDICARE

## 2024-08-20 NOTE — TELEPHONE ENCOUNTER
----- Message from Дмитрий Bates sent at 8/20/2024 10:24 AM CDT -----  Regarding: Percy  Type:Patient Callback     Who called: Percy     What is the request in detail: Pt stated that he would like to speak to the doctor. The patient stated that he feels nauseated and having hot flashes in his head. Sated it feels like he went through an oven. Please reach out to the patient as soon as possible.     Can the clinic reply by [facility]? Yes     Would the patient rather a call back or a response via [facility]? Callback     Best call back number: [phone]      Additional Information:

## 2024-08-20 NOTE — TELEPHONE ENCOUNTER
Called patient and he stated that he feel fine. He wanted to know about the Breathing Neb machine. He stated he took a treatment and he started to cough up mucus.which  he stated he doing well. Patient just wanted to talk to someone about his doctors and not getting charge for seeing a psych doctor only once a year and don't want to be charge.

## 2024-08-30 ENCOUNTER — OFFICE VISIT (OUTPATIENT)
Dept: PAIN MEDICINE | Facility: CLINIC | Age: 84
End: 2024-08-30
Payer: MEDICARE

## 2024-08-30 VITALS — DIASTOLIC BLOOD PRESSURE: 53 MMHG | HEART RATE: 83 BPM | OXYGEN SATURATION: 89 % | SYSTOLIC BLOOD PRESSURE: 110 MMHG

## 2024-08-30 DIAGNOSIS — M48.062 SPINAL STENOSIS OF LUMBAR REGION WITH NEUROGENIC CLAUDICATION: ICD-10-CM

## 2024-08-30 DIAGNOSIS — M54.16 LUMBAR RADICULOPATHY: ICD-10-CM

## 2024-08-30 DIAGNOSIS — M47.816 LUMBAR SPONDYLOSIS: ICD-10-CM

## 2024-08-30 DIAGNOSIS — M51.36 DDD (DEGENERATIVE DISC DISEASE), LUMBAR: Primary | ICD-10-CM

## 2024-08-30 DIAGNOSIS — M51.36 DDD (DEGENERATIVE DISC DISEASE), LUMBAR: ICD-10-CM

## 2024-08-30 PROCEDURE — 99999 PR PBB SHADOW E&M-EST. PATIENT-LVL V: CPT | Mod: PBBFAC,,,

## 2024-08-30 NOTE — PROGRESS NOTES
Subjective:     Patient ID: Percy Ramirez is a 83 y.o. male    Chief Complaint: Follow-up      Referred by: No ref. provider found      HPI:    Interval History PA (08/30/2024):  Patient returns to clinic for follow up and medication refill.  Patient denies significant changes in the quality or location of his pain since previous encounter.  He denies new or worsening symptoms.  Patient continues to take Norco 7.5-325 mg q.8 hours p.r.n. with adequate relief, denies any adverse effects from this medication.  No other concerns at this time    Interval History PA (05/31/2024):  Patient returns to clinic for follow up and medication refill.  Patient denies any changes in the quality or location of his pain since previous visit.  He denies any new or worsening symptoms.  He continues to take Norco 7.5-325 mg q.8 hours p.r.n. with adequate relief, denies any adverse effects from this medication.    Interval History PA (03/01/2024):  Patient returns to clinic for follow up and medication refill.  Patient denies any changes in the quality or location of his pain since previous visit.  He denies any new or worsening symptoms.  Recently patient increase to Norco 7.5-325 mg q.8 hours p.r.n. with adequate relief, denies any adverse effects from this medication.  Notes that the increased dosage has been more beneficial in helping control his pain levels.    Interval History (2/2/24):  He returns today for follow up.  He reports that he continues to have back pain as before.  Denies any changes in the quality or location of his pain.  Denies any new worsening symptoms.  Still has pain mainly when standing up and trying to do activities such as cooking.  Can only stand for a relatively short period time before needing to sit down.  Continues take Norco 5-325 mg q.8 hours p.r.n..  Has been prescribed 60 tablets per month as he reports taking roughly 2 pills per day.  However, states that medication seems to be a little less  effective lately.  Has been taking up to 3 times a day as prescribed.  He reports that he gets good relief from each dose, but still has significant pain.  He thinks that increasing the dosage may provide additional benefit.  He denies any adverse effects of this medication.  He is reluctant to increase dosage too much as he does not like to feel sedated.      Interval History PA (11/03/2023):  Patient returns to clinic for follow up and medication refill.  Patient denies any changes in the quality or location of his pain since previous visit.  Patient does report some worsening of pain overall.  Notes specifically worsening of midline lower back pain with prolonged activity.  Notes that he continues to take Norco 5-325 mg q.8 hours p.r.n. #60.  Typically splitting the pills in half and taking every 8 hours.  However due to recently increased pain he has been taking a full pill to help manage his pain.  Reporting adequate relief.  Denies any adverse effects from this medication.    Interval History PA (08/18/2023):  Patient returns to clinic for follow up of and medication refill.  Patient does report since previous visit he has had a incident where he fell at his home.  Occurred approximately 2 weeks ago and has been dealing with some increased pain around his lower lumbar region as well as hip.  States overall pain has been improving since incident.  Significant bruising noted midline lower lumbar spine and around his hips and arms.  Patient has been evaluated by primary care regarding this incident, imaging without evidence of fracture.  He continues to take Norco 5-325 mg q.8 hours p.r.n. #60.  States since the incident he has been taking a full pill to help manage the pain.  Continues to take 2 total pills per day with adequate relief.  Previously was cutting the pills in half and taking every 8 hours.  Denies any adverse effects from this medication.  Notes that he is scheduled to start physical therapy to  help with his mobility.    Interval History PA (06/15/2023):  Patient returns to clinic for follow up and medication refill.  She denies any changes in the quality or location his pain since previous visit.  Denies any new or worsening symptoms.  Continues to take Norco 5-325 mg q.8 hours p.r.n. #60. with adequate relief, denies any adverse effects from this medication.  Continues to use with the pills in half and will occasionally take a full pill for episodes of increased pain.  Approximately 2 pills per day.    Interval History PA (03/24/2023):  Patient returns to clinic for follow up of chronic lower back and extremity pain and medication refill.  Patient denies any changes in the quality or location of his pain since previous visit.  Denies any new or worsening symptoms.  Patient was recently started on [place] 5-325 Q 8 hours p.r.n..  He reports typically spitting the pills in half and occasionally taking a full pill with increased pain.  Notes taking approximately 2 pill total per day.  Notes adequate relief from this medication, denies any adverse effects.    Initial Encounter (2/24/23):  Percy Ramirez is a 83 y.o. male who presents today with chronic low back and lower extremity pain.  This pain has been present for years.  No specific inciting events or injuries noted.  Pain has been worsening over time.  Pain is constant but significantly worsened with standing or walking.  Can only stand or walk for short periods of time before needing to rest.  Pain is located in the midline lower lumbar spine and will radiate into the bilateral lumbar paraspinal regions and hips.  Denies any persistent numbness, tingling, weakness, bowel bladder dysfunction.  Does report that his legs feel weak after standing or walking for prolonged periods of time.  Pain improves upon sitting but still has pain when sitting.   This pain is described in detail below.    Physical Therapy:  No.    Non-pharmacologic Treatment:  Rest  helps         TENS?  No    Pain Medications:         Currently taking:  Tylenol, gabapentin, Robaxin, Cymbalta, Norco 7.5-325 mg Q 8 hours prn    Has tried in the past:      Has not tried:  NSAIDs, TCAs, topical creams    Blood thinners:  Coumadin    Interventional Therapies:  None    Relevant Surgeries:  None    Affecting sleep?  Yes    Affecting daily activities? yes    Depressive symptoms? no          SI/HI? No    Work status: Retired    Pain Scores:    Best:       8/10  Worst:     10/10  Usually:   10/10  Today:    10/10    Pain Disability Index  Family/Home Responsibilities:: 10  Recreation:: 10  Social Activity:: 10  Self Care:: 10  Life-Support Activities:: 0    Review of Systems   Constitutional:  Negative for activity change, appetite change, chills, fatigue, fever and unexpected weight change.   HENT:  Negative for hearing loss.    Eyes:  Negative for visual disturbance.   Respiratory:  Negative for chest tightness and shortness of breath.    Cardiovascular:  Negative for chest pain.   Gastrointestinal:  Negative for abdominal pain, constipation, diarrhea, nausea and vomiting.   Genitourinary:  Negative for difficulty urinating.   Musculoskeletal:  Positive for arthralgias, back pain, gait problem and myalgias. Negative for neck pain.   Skin:  Negative for rash.   Neurological:  Negative for dizziness, weakness, light-headedness, numbness and headaches.   Psychiatric/Behavioral:  Positive for sleep disturbance. Negative for hallucinations and suicidal ideas. The patient is not nervous/anxious.        Past Medical History:   Diagnosis Date    Allergy     Arthritis     CAD, multiple vessel     DR Melissa    Cataract     Depression     History of colon polyps     Hyperlipidemia     Hypertension     Macular degeneration     Dr Razo for injection, Jennifer for eye    S/P CABG x 2     Type 2 diabetes mellitus with circulatory disorder     Dr. Aquino       Past Surgical History:   Procedure Laterality Date     broken elbow      left    COLONOSCOPY N/A 10/4/2017    Procedure: COLONOSCOPY;  Surgeon: Gabriel Leung MD;  Location: [facility] ENDO;  Service: Endoscopy;  Laterality: N/A;    EYE SURGERY      cataract    heart bypass      2004    HERNIA REPAIR      right    ROTATOR CUFF REPAIR      right  2004       Social History     Socioeconomic History    Marital status:     Number of children: 4    Years of education: GED   Occupational History    Occupation: retired tug    Tobacco Use    Smoking status: Former     Current packs/day: 0.00     Average packs/day: 3.0 packs/day for 45.0 years (135.0 ttl pk-yrs)     Types: Cigarettes     Start date: 1959     Quit date: 2004     Years since quittin.3    Smokeless tobacco: Former   Substance and Sexual Activity    Alcohol use: Yes     Comment: was heavy drinker 35 years ago, rare use now    Drug use: No    Sexual activity: Yes     Partners: Female     Social Determinants of Health     Financial Resource Strain: Low Risk  (2024)    Overall Financial Resource Strain (CARDIA)     Difficulty of Paying Living Expenses: Not hard at all   Food Insecurity: No Food Insecurity (2024)    Hunger Vital Sign     Worried About Running Out of Food in the Last Year: Never true     Ran Out of Food in the Last Year: Never true   Transportation Needs: No Transportation Needs (2024)    PRAPARE - Transportation     Lack of Transportation (Medical): No     Lack of Transportation (Non-Medical): No   Physical Activity: Inactive (2024)    Exercise Vital Sign     Days of Exercise per Week: 0 days     Minutes of Exercise per Session: 0 min   Stress: No Stress Concern Present (2024)    [employer] - Occupational Stress Questionnaire     Feeling of Stress : Not at all   Housing Stability: Low Risk  (2024)    Housing Stability Vital Sign     Unable to Pay for Housing in the Last Year: No     Homeless in the Last Year: No  "      Review of patient's allergies indicates:   Allergen Reactions    Aricept odt [donepezil] Diarrhea and Nausea And Vomiting    Codeine Nausea Only     weak    Ranexa [ranolazine]        Current Outpatient Medications on File Prior to Visit   Medication Sig Dispense Refill    acetaminophen (TYLENOL) 325 MG tablet Take 2 tablets (650 mg total) by mouth every 6 (six) hours as needed. 13 tablet 0    albuterol (PROVENTIL) 2.5 mg /3 mL (0.083 %) nebulizer solution Take 3 mLs (2.5 mg total) by nebulization every 6 to 8 hours as needed for Wheezing. 100 mL 3    albuterol (PROVENTIL/VENTOLIN HFA) 90 mcg/actuation inhaler INHALE 2 PUFFS BY MOUTH EVERY 6 HOURS AS NEEDED FOR WHEEZING OR  SHORTNESS  OF  BREATH 8 g 3    atorvastatin (LIPITOR) 40 MG tablet Take 40 mg by mouth once daily.      BD INSULIN PEN NEEDLE UF SHORT 31 gauge x 5/16" Ndle       BD INSULIN SYRINGE ULTRA-FINE 1/2 mL 31 gauge x 15/64" Syrg       blood sugar diagnostic Strp To check BG 3 times daily, to use with insurance preferred meter 200 strip 11    carvediloL (COREG) 3.125 MG tablet Take 1 tablet (3.125 mg total) by mouth 2 (two) times daily with meals. 60 tablet 2    ceramides 1,3,6-II (CERAVE DAILY MOISTURIZING) Lotn Apply twice daily to skin 355 mL 1    cinnamon bark 500 mg capsule Take 1,000 mg by mouth once daily.        clopidogreL (PLAVIX) 75 mg tablet Take 75 mg by mouth.      diclofenac sodium (VOLTAREN) 1 % Gel Apply 2 g topically 3 (three) times daily. 50 g 0    DULoxetine (CYMBALTA) 60 MG capsule Take 1 capsule (60 mg total) by mouth once daily. 90 capsule 3    fluticasone propionate (FLONASE) 50 mcg/actuation nasal spray 1 spray (50 mcg total) by Each Nostril route 2 (two) times daily. 18.2 mL 3    furosemide (LASIX) 40 MG tablet Take 40 mg by mouth once daily.      gabapentin (NEURONTIN) 300 MG capsule Take 4 capsules (1,200 mg total) by mouth 2 (two) times daily. 540 capsule 1    glimepiride (AMARYL) 4 MG tablet Take 4 mg by mouth daily " "with breakfast.       HYDROcodone-acetaminophen (NORCO) 7.5-325 mg per tablet Take 1 tablet by mouth every 8 (eight) hours as needed for Pain. 90 tablet 0    insulin NPH-insulin regular, 70/30, (NOVOLIN 70/30) 100 unit/mL (70-30) injection Inject into the skin. Inject 10 units at breakfast and inject 10 units at night      insulin syringe-needle U-100 0.3 mL 31 gauge x 15/64" Syrg USE TO INJECT INSULIN THREE TIMES DAILY      insulin syringe-needle U-100 1/2 mL 31 x 5/16" Syrg       ipratropium (ATROVENT) 42 mcg (0.06 %) nasal spray 2 sprays by Nasal route every 6 (six) hours as needed for Rhinitis (use as needed for runny nose and also use 15 minutes prior to meal). Clean nose with saline prior to use 15 mL 3    isosorbide mononitrate (IMDUR) 30 MG 24 hr tablet Take 30 mg by mouth once daily.      lancets Misc To check BG 3 times daily, to use with insurance preferred meter 200 each 11    LIDOcaine (LIDODERM) 5 % Place 1 patch onto the skin once daily. Remove & Discard patch within 12 hours or as directed by MD 5 patch 1    meclizine (ANTIVERT) 12.5 mg tablet Take 1 tablet (12.5 mg total) by mouth 3 (three) times daily as needed for Dizziness. 90 tablet 0    methocarbamoL (ROBAXIN) 500 MG Tab TAKE 1 TABLET BY MOUTH 4 TIMES DAILY FOR 10 DAYS 60 tablet 0    mupirocin (BACTROBAN) 2 % ointment Apply topically 3 (three) times daily. 1 g 1    nitroGLYCERIN (NITROSTAT) 0.4 MG SL tablet DISSOLVE 1 TABLET UNDER THE TONGUE EVERY 5 MINUTES AS  NEEDED FOR CHEST PAIN. MAX  OF 3 TABLETS IN 15 MINUTES. CALL 911 IF PAIN PERSISTS.      omeprazole (PRILOSEC) 20 MG capsule Take 1 capsule (20 mg total) by mouth once daily. 30 capsule 2    pravastatin (PRAVACHOL) 20 MG tablet Take 1 tablet (20 mg total) by mouth once daily. 90 tablet 1    spironolactone (ALDACTONE) 25 MG tablet Take 0.5 tablets (12.5 mg total) by mouth once daily.      tiotropium-olodateroL (STIOLTO RESPIMAT) 2.5-2.5 mcg/actuation Mist Inhale 2 puffs into the lungs once " daily. Controller 1 g 11    triamcinolone acetonide 0.1% (KENALOG) 0.1 % cream SMARTSI Application Topical 2-3 Times Daily      valsartan (DIOVAN) 40 MG tablet Take 1 tablet (40 mg total) by mouth once daily. 30 tablet 2    VIT C/VIT E AC/LUT/COPPER/ZINC (PRESERVISION LUTEIN ORAL) Take by mouth.      warfarin (COUMADIN) 5 MG tablet Take 2 tabs by mouth on Tuesday,Thursday, Saturday and Sundays then 1.5 on all other days.       No current facility-administered medications on file prior to visit.       Objective:      BP (!) 110/53 (BP Location: Left arm, Patient Position: Sitting, BP Method: Medium (Automatic))   Pulse 83   SpO2 (!) 89%     Exam:  GEN:  Well developed, well nourished.  No acute distress.   HEENT:  No trauma.  Mucous membranes moist.  On O2 via nasal cannula.  PSYCH: Normal affect. Thought content appropriate.  CHEST:  Breathing symmetric.  No audible wheezing.  ABD: Soft, non-distended.  SKIN:  Warm, pink, dry.  No rash on exposed areas.    EXT:  No cyanosis, clubbing, or edema.  No color change or changes in nail or hair growth.  NEURO/MUSCULOSKELETAL:  Fully alert, oriented, and appropriate. Speech normal keesha. No cranial nerve deficits.   Gait:  Antalgic.  Uses rolling walker for ambulation.     Imaging:  Narrative & Impression    EXAMINATION:  XR HIP WITH PELVIS WHEN PERFORMED, 2 OR 3  VIEWS RIGHT     CLINICAL HISTORY:  Unspecified fall, initial encounter     TECHNIQUE:  AP view of the pelvis and frog leg lateral view of the right hip were performed.     COMPARISON:  Non 2020 e     FINDINGS:  Mild DJD.  Left hip joint space is slightly narrowed.  No acute fracture or dislocation.  No bone destruction identified.  Vascular calcifications noted.     Impression:     See above        Electronically signed by: Trever Thompson MD  Date:                                            2023  Time:                                           12:23     Narrative & Impression    EXAMINATION:  XR  LUMBAR SPINE AP AND LATERAL     CLINICAL HISTORY:  Unspecified fall, initial encounter     TECHNIQUE:  AP, lateral and spot images were performed of the lumbar spine.     COMPARISON:  10/19/2022 CT lumbar spine none     FINDINGS:  Mild DJD.  There is a grade 1 L4/L5 anterolisthesis.  The L4/L5 disc space is slightly narrowed.  No fracture or dislocation.  No bone destruction identified     Impression:     See above        Electronically signed by: Trever Thompson MD  Date:                                            08/11/2023  Time:                                           12:21     Narrative & Impression    EXAMINATION:  CT LUMBAR SPINE WITHOUT CONTRAST     CLINICAL HISTORY:  Lumbar radiculopathy, no red flags, no prior management;  Radiculopathy, lumbar region     TECHNIQUE:  Low-dose axial, sagittal and coronal reformations are obtained through the lumbar spine.  Contrast was not administered.     COMPARISON:  02/25/2015     FINDINGS:  Alignment: Grade 1 anterolisthesis at L4-L5.     Vertebrae: No fracture. No lytic or blastic lesion.     Discs: Mild disc height loss at L3-L5.     Sacroiliac joints: Mild degenerative changes.     Degenerative findings:     T12-L1: No spinal canal stenosis or neural foraminal narrowing.     L1-L2: No spinal canal stenosis or neural foraminal narrowing.     L2-L3: Circumferential disc bulge and mild facet arthropathy resulting in mild bilateral neural foraminal narrowing.     L3-L4: Circumferential disc bulge and moderate facet arthropathy resulting in moderate to severe spinal canal stenosis and moderate bilateral neural foraminal narrowing.     L4-L5: Circumferential disc bulge and severe facet arthropathy resulting in severe spinal canal stenosis and moderate bilateral neural foraminal narrowing.     L5-S1: Circumferential disc bulge and severe facet arthropathy resulting in mild bilateral neural foraminal narrowing.     Paraspinal muscles & soft tissues: Mild paraspinal muscle  atrophy.  Vascular calcifications with partially visualized ectasia of the abdominal aorta and iliac arteries.  Fibrotic changes at the lung bases.     Impression:     1. Multilevel degenerative changes of the lumbar spine detailed above.  Moderate to severe spinal canal stenosis at L4-S1.  Moderate neural foraminal narrowing at L3-L5.  2. 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        Electronically signed by: Yogesh Gamez MD  Date:                                            10/19/2022  Time:                                           09:56       Assessment:       1. DDD (degenerative disc disease), lumbar        2. Lumbar radiculopathy        3. Spinal stenosis of lumbar region with neurogenic claudication        4. Lumbar spondylosis                  Plan:       Percy was seen today for follow-up.    Diagnoses and all orders for this visit:    DDD (degenerative disc disease), lumbar    Lumbar radiculopathy    Spinal stenosis of lumbar region with neurogenic claudication    Lumbar spondylosis          Percy Ramirez is a 83 y.o. male with chronic low back and lower extremity pain.  Subjectively symptoms most consistent with neurogenic claudication related to spinal stenosis.  Patient is noted to have multilevel moderate to severe spinal stenosis on lumbar CT scan.    Prior records reviewed.   Continue Norco 7.5-325 mg q.8 hours p.r.n..  Three, one month supplies of 90 pills per month provided today.   Prescription monitoring program reviewed today.  No inconsistencies noted.   Drugs or abuse blood screen completed on 05/31/2024.  No inconsistencies noted.  Opioid risk tool completed.  Low risk.  Pain contract signed on 03/24/2023.  Dr. Santos is the provider of medication management and agrees with the plan of care.   Return to clinic in 3 months or sooner if needed.  At that time we will discuss efficacy of medications and make any necessary adjustments.     Fernando Downs  [facility]  [facility]  Interventional Pain Management   08/30/2024    This note was created by combination of typed  and M-Modal dictation.  Transcription and phonetic errors may be present.  If there are any questions, please contact me.

## 2024-09-03 RX ORDER — HYDROCODONE BITARTRATE AND ACETAMINOPHEN 7.5; 325 MG/1; MG/1
1 TABLET ORAL EVERY 8 HOURS PRN
Qty: 90 TABLET | Refills: 0 | Status: SHIPPED | OUTPATIENT
Start: 2024-11-03 | End: 2024-12-03

## 2024-09-03 RX ORDER — HYDROCODONE BITARTRATE AND ACETAMINOPHEN 7.5; 325 MG/1; MG/1
1 TABLET ORAL EVERY 8 HOURS PRN
Qty: 90 TABLET | Refills: 0 | Status: SHIPPED | OUTPATIENT
Start: 2024-09-04 | End: 2024-10-04

## 2024-09-03 RX ORDER — HYDROCODONE BITARTRATE AND ACETAMINOPHEN 7.5; 325 MG/1; MG/1
1 TABLET ORAL EVERY 8 HOURS PRN
Qty: 90 TABLET | Refills: 0 | Status: SHIPPED | OUTPATIENT
Start: 2024-10-04 | End: 2024-11-03

## 2024-09-09 ENCOUNTER — TELEPHONE (OUTPATIENT)
Dept: CARDIOLOGY | Facility: CLINIC | Age: 84
End: 2024-09-09
Payer: MEDICARE

## 2024-09-09 NOTE — TELEPHONE ENCOUNTER
----- Message from Luis Alfredo Craig sent at 9/9/2024  9:37 AM CDT -----  Contact: Angelica  Type:  Patient Call          Who Called: Patient         Does the patient know what this is regarding?: Requesting a call back ;pt said that he never cross [place] bridge so he'll need a appt scheduled on the [facility] ; please advise           Would the patient rather a call back or a response via [facility]?call           Best Call Back Number: [phone]             Additional Information:

## 2024-09-10 ENCOUNTER — PATIENT OUTREACH (OUTPATIENT)
Dept: ADMINISTRATIVE | Facility: HOSPITAL | Age: 84
End: 2024-09-10
Payer: MEDICARE

## 2024-09-10 RX ORDER — ATORVASTATIN CALCIUM 40 MG/1
1 TABLET, FILM COATED ORAL EVERY MORNING
COMMUNITY
Start: 2024-08-12

## 2024-09-23 NOTE — PROGRESS NOTES
Subjective:      Patient ID: Percy Ramirez is a 83 y.o. male.    Chief Complaint: Nail Care      Percy is a 83 y.o. male who presents to the clinic for evaluation and treatment of high risk feet. Percy has a past medical history of Allergy, Arthritis, CAD, multiple vessel, Cataract, Depression, History of colon polyps, Hyperlipidemia, Hypertension, Macular degeneration, S/P CABG x 2, and Type 2 diabetes mellitus with circulatory disorder. The patient's may concern today is the need for rx shoes.  He present with paperwork he needs signed from [employer]. Relates while looking for a shoe vendor he accidentally went to a podiatrist office who offered foot care but not shoes.   This patient has documented high risk feet requiring routine maintenance secondary to diabetes mellitis and those secondary complications of diabetes, as mentioned..    PCP: Kasie Edmondson MD    Date Last Seen by PCP:   Chief Complaint   Patient presents with    Nail Care     Current shoe gear:  Velcro tennis shoes  Hemoglobin A1C   Date Value Ref Range Status   08/13/2024 9.7 (H) 4.0 - 5.6 % Final     Comment:     Dr. Gregory Aquino   03/18/2024 9.8 (H) 4.0 - 5.6 % Final     Comment:     [facility]   12/20/2023 10.7 (H) 4.0 - 5.6 % Final     Comment:     [facility]     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    Coronary artery disease    S/P CABG x 2    Macular degeneration    Obstructive sleep apnea treated with BiPAP    Anxiety state, unspecified    Insulin long-term use    Primary osteoarthritis of both knees    Chronic low back pain    DJD (degenerative joint disease), lumbar    Poorly controlled type 2 diabetes mellitus with neuropathy    Bilateral carotid artery disease    Hyperlipidemia LDL goal <100    Type 2 diabetes, with peripheral  "circulatory disorder not at goal    Atherosclerosis of abdominal aorta    Overweight (BMI 25.0-29.9)    Persistent atrial fibrillation    Chronic stable angina    Senile purpura    Goals of care, counseling/discussion    Osteoarthritis of carpometacarpal (CMC) joint of left thumb    MCI (mild cognitive impairment)    Dizziness and giddiness    Pulmonary nodule    Dyspnea on exertion    Chronic combined systolic and diastolic heart failure    Walker as ambulation aid    ILD (interstitial lung disease)    History of cardioversion    Erectile dysfunction    Secondary hyperparathyroidism of renal origin    Noncompliance with medication regimen    Exudative age-related macular degeneration, right eye, with active choroidal neovascularization    History of stroke    Chronic tension-type headache, intractable    Spinal stenosis of lumbar region with neurogenic claudication    Lumbar radiculopathy    Lumbar spondylosis    DDD (degenerative disc disease), lumbar    Cerebral atherosclerosis    Chronic kidney disease, stage 3a    Ear pressure, bilateral    Dysphagia    Advance care planning     Current Outpatient Medications on File Prior to Visit   Medication Sig Dispense Refill    acetaminophen (TYLENOL) 325 MG tablet Take 2 tablets (650 mg total) by mouth every 6 (six) hours as needed. 13 tablet 0    albuterol (PROVENTIL) 2.5 mg /3 mL (0.083 %) nebulizer solution Take 3 mLs (2.5 mg total) by nebulization every 6 to 8 hours as needed for Wheezing. 100 mL 3    albuterol (PROVENTIL/VENTOLIN HFA) 90 mcg/actuation inhaler INHALE 2 PUFFS BY MOUTH EVERY 6 HOURS AS NEEDED FOR WHEEZING OR  SHORTNESS  OF  BREATH 8 g 3    atorvastatin (LIPITOR) 40 MG tablet Take 40 mg by mouth once daily.      atorvastatin (LIPITOR) 40 MG tablet Take 1 tablet by mouth every morning.      BD INSULIN PEN NEEDLE UF SHORT 31 gauge x 5/16" Ndle       BD INSULIN SYRINGE ULTRA-FINE 1/2 mL 31 gauge x 15/64" Syrg       blood sugar diagnostic Strp To check BG 3 " "times daily, to use with insurance preferred meter 200 strip 11    carvediloL (COREG) 3.125 MG tablet Take 1 tablet (3.125 mg total) by mouth 2 (two) times daily with meals. 60 tablet 2    ceramides 1,3,6-II (CERAVE DAILY MOISTURIZING) Lotn Apply twice daily to skin 355 mL 1    cinnamon bark 500 mg capsule Take 1,000 mg by mouth once daily.        clopidogreL (PLAVIX) 75 mg tablet Take 75 mg by mouth.      diclofenac sodium (VOLTAREN) 1 % Gel Apply 2 g topically 3 (three) times daily. 50 g 0    DULoxetine (CYMBALTA) 60 MG capsule Take 1 capsule (60 mg total) by mouth once daily. 90 capsule 3    fluticasone propionate (FLONASE) 50 mcg/actuation nasal spray 1 spray (50 mcg total) by Each Nostril route 2 (two) times daily. 18.2 mL 3    furosemide (LASIX) 40 MG tablet Take 40 mg by mouth once daily.      gabapentin (NEURONTIN) 300 MG capsule Take 4 capsules (1,200 mg total) by mouth 2 (two) times daily. 540 capsule 1    glimepiride (AMARYL) 4 MG tablet Take 4 mg by mouth daily with breakfast.       HYDROcodone-acetaminophen (NORCO) 7.5-325 mg per tablet Take 1 tablet by mouth every 8 (eight) hours as needed for Pain. 90 tablet 0    [START ON 10/4/2024] HYDROcodone-acetaminophen (NORCO) 7.5-325 mg per tablet Take 1 tablet by mouth every 8 (eight) hours as needed for Pain. 90 tablet 0    [START ON 11/3/2024] HYDROcodone-acetaminophen (NORCO) 7.5-325 mg per tablet Take 1 tablet by mouth every 8 (eight) hours as needed for Pain. 90 tablet 0    insulin NPH-insulin regular, 70/30, (NOVOLIN 70/30) 100 unit/mL (70-30) injection Inject into the skin. Inject 10 units at breakfast and inject 10 units at night      insulin syringe-needle U-100 0.3 mL 31 gauge x 15/64" Syrg USE TO INJECT INSULIN THREE TIMES DAILY      insulin syringe-needle U-100 1/2 mL 31 x 5/16" Syrg       ipratropium (ATROVENT) 42 mcg (0.06 %) nasal spray 2 sprays by Nasal route every 6 (six) hours as needed for Rhinitis (use as needed for runny nose and also use " 15 minutes prior to meal). Clean nose with saline prior to use 15 mL 3    isosorbide mononitrate (IMDUR) 30 MG 24 hr tablet Take 30 mg by mouth once daily.      lancets Misc To check BG 3 times daily, to use with insurance preferred meter 200 each 11    LIDOcaine (LIDODERM) 5 % Place 1 patch onto the skin once daily. Remove & Discard patch within 12 hours or as directed by MD 5 patch 1    meclizine (ANTIVERT) 12.5 mg tablet Take 1 tablet (12.5 mg total) by mouth 3 (three) times daily as needed for Dizziness. 90 tablet 0    methocarbamoL (ROBAXIN) 500 MG Tab TAKE 1 TABLET BY MOUTH 4 TIMES DAILY FOR 10 DAYS 60 tablet 0    mupirocin (BACTROBAN) 2 % ointment Apply topically 3 (three) times daily. 1 g 1    nitroGLYCERIN (NITROSTAT) 0.4 MG SL tablet DISSOLVE 1 TABLET UNDER THE TONGUE EVERY 5 MINUTES AS  NEEDED FOR CHEST PAIN. MAX  OF 3 TABLETS IN 15 MINUTES. CALL 911 IF PAIN PERSISTS.      omeprazole (PRILOSEC) 20 MG capsule Take 1 capsule (20 mg total) by mouth once daily. 30 capsule 2    pravastatin (PRAVACHOL) 20 MG tablet Take 1 tablet (20 mg total) by mouth once daily. 90 tablet 1    spironolactone (ALDACTONE) 25 MG tablet Take 0.5 tablets (12.5 mg total) by mouth once daily.      tiotropium-olodateroL (STIOLTO RESPIMAT) 2.5-2.5 mcg/actuation Mist Inhale 2 puffs into the lungs once daily. Controller 1 g 11    triamcinolone acetonide 0.1% (KENALOG) 0.1 % cream SMARTSI Application Topical 2-3 Times Daily      valsartan (DIOVAN) 40 MG tablet Take 1 tablet (40 mg total) by mouth once daily. 30 tablet 2    VIT C/VIT E AC/LUT/COPPER/ZINC (PRESERVISION LUTEIN ORAL) Take by mouth.      warfarin (COUMADIN) 5 MG tablet Take 2 tabs by mouth on Tuesday,Thursday, Saturday and Sundays then 1.5 on all other days.       No current facility-administered medications on file prior to visit.     Review of patient's allergies indicates:   Allergen Reactions    Codeine Nausea Only     weak     Past Surgical History:   Procedure  Laterality Date    broken elbow      left    COLONOSCOPY N/A 10/4/2017    Procedure: COLONOSCOPY;  Surgeon: Gabriel Leung MD;  Location: [facility] END[facility];  Service: Endoscopy;  Laterality: N/A;    EYE SURGERY      cataract    heart bypass      2004    HERNIA REPAIR      right    ROTATOR CUFF REPAIR      right  2004     Family History   Problem Relation Name Age of Onset    Arthritis Mother      Diabetes Mother      Stroke Mother      Heart attack Father      Cancer Brother      Throat cancer Brother      Diabetes Maternal Aunt      Diabetes Maternal Uncle      Heart disease Maternal Uncle      Diabetes Paternal Aunt      Heart disease Paternal Aunt      Heart disease Paternal Uncle      Heart disease Maternal Grandmother      Cancer Maternal Grandfather       Social History     Socioeconomic History    Marital status:     Number of children: 4    Years of education: GED   Occupational History    Occupation: retired tug    Tobacco Use    Smoking status: Former     Current packs/day: 0.00     Average packs/day: 3.0 packs/day for 45.0 years (135.0 ttl pk-yrs)     Types: Cigarettes     Start date: 1959     Quit date: 2004     Years since quittin.4    Smokeless tobacco: Former   Substance and Sexual Activity    Alcohol use: Yes     Comment: was heavy drinker 35 years ago, rare use now    Drug use: No    Sexual activity: Yes     Partners: Female     Social Determinants of Health     Financial Resource Strain: Low Risk  (2024)    Overall Financial Resource Strain (CARDIA)     Difficulty of Paying Living Expenses: Not hard at all   Food Insecurity: No Food Insecurity (2024)    Hunger Vital Sign     Worried About Running Out of Food in the Last Year: Never true     Ran Out of Food in the Last Year: Never true   Transportation Needs: No Transportation Needs (2024)    PRAPARE - Transportation     Lack of Transportation (Medical): No     Lack of Transportation (Non-Medical): No  "  Physical Activity: Inactive (6/20/2024)    Exercise Vital Sign     Days of Exercise per Week: 0 days     Minutes of Exercise per Session: 0 min   Stress: No Stress Concern Present (6/20/2024)    [place] [employer] - Occupational Stress Questionnaire     Feeling of Stress : Not at all   Housing Stability: Low Risk  (6/20/2024)    Housing Stability Vital Sign     Unable to Pay for Housing in the Last Year: No     Homeless in the Last Year: No     Review of Systems   Constitution: Negative for chills, fever and weakness.   Cardiovascular: Negative for claudication and leg swelling.   Respiratory: Positive for cough. Negative for shortness of breath.    Skin: Positive for dry skin and nail changes. Negative for itching and rash.   Musculoskeletal: Positive for arthritis, back pain and joint pain. Negative for falls, joint swelling and muscle weakness.   Gastrointestinal: Negative for diarrhea, nausea and vomiting.   Neurological: Positive for numbness and paresthesias. Negative for tremors.   Psychiatric/Behavioral: Negative for altered mental status and hallucinations.           Objective:       Vitals:    09/24/24 0809   BP: 125/61   Pulse: 89   Weight: 87.6 kg (193 lb 2 oz)   Height: 5' 8" (1.727 m)   PainSc: 0-No pain           Physical Exam   Constitutional:   General: Pt. is well-developed, well-nourished, appears stated age, in no acute distress, alert and oriented x 3. No evidence of depression, anxiety, or agitation. Calm, cooperative, and communicative. Appropriate interactions and affect.       Cardiovascular:   Pulses:       Dorsalis pedis pulses are 2+ on the right side, and 2+ on the left side.        Posterior tibial pulses are 1+ on the right side, and 1+ on the left side.   There is decreased digital hair.   Musculoskeletal:        Right foot: There is normal range of motion.        Left foot:  There is normal range of motion.   Muscle strength is 5/5 in all groups " bilaterally.    Decreased stride, station of gait.  apropulsive toe off.  Increased angle and base of gait.    Patient has hammertoes of digits 2-5 bilateral partially reducible without symptom today.    Visible and palpable bunion without pain at dorsomedial 1st metatarsal head right and left.  Hallux abducted right and left partially reducible, tracks laterally without being track bound.  No ecchymosis, erythema, edema, or cardinal signs infection or signs of trauma same foot.     Neurological: A sensory deficit is present.   [place] 5.07 monofilamant testing is diminished Farhad feet. Sharp/dull sensation diminished Bilaterally   Skin: Skin is warm, dry. No abrasion, no ecchymosis, no rash noted. He is not diaphoretic. No cyanosis. No pallor. Nails show no clubbing.   Toenails 1-5 bilaterally are  discolored/yellowed, dystrophic, brittle with subungual debris.    Interdigital Spaces clean, dry and without evidence of break in skin integrity   Psychiatric: He has a normal mood and affect. His speech is normal.   Nursing note and vitals reviewed.        Assessment:       Encounter Diagnoses   Name Primary?    Type II diabetes mellitus with neurological manifestations Yes    Hammer toes of both feet     Hallux abducto valgus, left     Hallux abducto valgus, right              Plan:       Percy was seen today for nail care.    Diagnoses and all orders for this visit:    Type II diabetes mellitus with neurological manifestations  -     DIABETIC SHOES FOR HOME USE    Hammer toes of both feet  -     DIABETIC SHOES FOR HOME USE    Hallux abducto valgus, left  -     DIABETIC SHOES FOR HOME USE    Hallux abducto valgus, right  -     DIABETIC SHOES FOR HOME USE        I counseled the patient on his conditions, their implications and medical management.      Education about the diabetic foot, neuropathy, and prevention of limb loss.    Shoe inspection. Diabetic Foot Education. Patient reminded of the importance of  good nutrition/healthy diet/weight management and blood sugar control to help prevent podiatric complications of diabetes. Patient instructed on proper foot hygeine. Wear comfortable, proper fitting shoes. Wash feet daily. Dry well. After drying, apply moisturizer to feet (no lotion to webspaces). Inspect feet daily for skin breaks, blisters, swelling, or redness. Wear cotton socks (preferably white)  Change socks every day. Do NOT walk barefoot. Do NOT use heating pads or hot water soaks. We discussed wearing proper shoe gear, daily foot inspections, never walking without protective shoe gear.     Discussed edema control and the importance of daily moisturizer to the skin of the lower extremity    Recommend applying vicks vaporub to thick abnormal toenails daily x 6 months to treat fungal nail infection.    Unfortunately, I cannot sign the paperwork he presents to clinic with as it needs to be signed by his PCP or endocrinologist. Rx diabetic shoes for protection and support.  Gave patient an envelope and suggested he take the paperwork upstairs for his PCP to sign and fax.    - He will continue to monitor the areas daily, inspect his feet, wear protective shoe gear when ambulatory, moisturizer to maintain skin integrity and follow in this office in approximately 2-3 months, sooner PRN

## 2024-09-23 NOTE — PATIENT INSTRUCTIONS
Over the counter pain creams: Voltaren Gel, Biofreeze, Bengay, tiger balm, two old goat, lidocaine gel,  Absorbine Veterinary Liniment Gel Topical Analgesic Sore Muscle and Joint Pain Relief    Recommend lotions: eucerin, eucerin for diabetics, aquaphor, A&D ointment, gold bond for diabetics, sween, Branch's Bees all purpose baby ointment,  urea 40 with aloe or SkinIntegra rapid crack repair (found on amazon.com)    Shoe recommendations: (try 6pm.com, zappos.com , nordstromrack.rankdesk, or shoes.com for discounted prices) you can visit varsity shoes in [place], DSW shoes in [place]  or petar rack in the [facility] (there are also several shoe brand outlets in the [facility])    ONLY purchase stability style tennis shoes AVOID flex, foam, free, yoga mat style shoes or shoes that claim to feel like clouds  If you have a flatter foot purchase shoes for PRONATION  If you have a high arch purchase shoes for SUPINATION    Shoe examples:    Asics (GT or gel foundations, gel-kayano-30), new balance stability type shoes (such as the 940 series or the [id number]), dipika (Guide 16, stabil c3),  Mahoney (GTS or Beast or   transcend), propet, HokaOne (suzi 7, arahi, yu) Jose (tennis shoes and boots)    Sofft Brand (women) Gustabo&Billy (men), clarks, crocs, aerosoles, naturalizers, SAS, ecco, born, ngoc lara, rockports (dress shoes)    Vionic, burkenstocks, fitflops, propet, taos, baretraps, Hoka or vionic recovery slides/sandals (sandals)    Hoka or vionic recovery slides/sandals, crocs, propet (house shoes)      Nail Home remedy:  Vicks Vapor rub or Emuaid to nails for easier manageability    Diabetes: Inspecting Your Feet  Diabetes increases your chances of developing foot problems. So inspect your feet every day. This helps you find small skin irritations before they become serious infections. If you have trouble seeing the bottoms of your feet, use a mirror or ask a family member or friend to help.     Pressure  spots on the bottom of the foot are common areas where problems develop.   How to check your feet  Below are tips to help you look for foot problems. Try to check your feet at the same time each day, such as when you get out of bed in the morning:  Check the top of each foot. The tops of toes, back of the heel, and outer edge of the foot can get a lot of rubbing from poor-fitting shoes.  Check the bottom of each foot. Daily wear and tear often leads to problems at pressure spots.  Check the toes and nails. Fungal infections often occur between toes. Toenail problems can also be a sign of fungal infections or lead to breaks in the skin.  Check your shoes, too. Loose objects inside a shoe can injure the foot. Use your hand to feel inside your shoes for things like alfredo, loose stitching, or rough areas that could irritate your skin.  Warning signs  Look for any color changes in the foot. Redness with streaks can signal a severe infection, which needs immediate medical attention. Tell your doctor right away if you have any of these problems:  Swelling, sometimes with color changes, may be a sign of poor blood flow or infection. Symptoms include tenderness and an increase in the size of your foot.  Warm or hot areas on your feet may be signs of infection. A foot that is cold may not be getting enough blood.  Sensations such as burning, tingling, or pins and needles can be signs of a problem. Also check for areas that may be numb.  Hot spots are caused by friction or pressure. Look for hot spots in areas that get a lot of rubbing. Hot spots can turn into blisters, calluses, or sores.  Cracks and sores are caused by dry or irritated skin. They are a sign that the skin is breaking down, which can lead to infection.  Toenail problems to watch for include nails growing into the skin (ingrown toenail) and causing redness or pain. Thick, yellow, or discolored nails can signal a fungal infection.  Drainage and odor can  develop from untreated sores and ulcers. Call your doctor right away if you notice white or yellow drainage, bleeding, or unpleasant odor.   © 5301-5067 [employer]. [address] [place] [address]. All rights reserved. This information is not intended as a substitute for professional medical care. Always follow your healthcare professional's instructions.        Step-by-Step:  Inspecting Your Feet (Diabetes)    Date Last Reviewed: 10/1/2016  © 9826-1218 [employer]. [address] [place] [address]. All rights reserved. This information is not intended as a substitute for professional medical care. Always follow your healthcare professional's instructions.

## 2024-09-24 ENCOUNTER — TELEPHONE (OUTPATIENT)
Dept: FAMILY MEDICINE | Facility: CLINIC | Age: 84
End: 2024-09-24
Payer: MEDICARE

## 2024-09-24 ENCOUNTER — OFFICE VISIT (OUTPATIENT)
Dept: PODIATRY | Facility: CLINIC | Age: 84
End: 2024-09-24
Payer: MEDICARE

## 2024-09-24 VITALS
WEIGHT: 193.13 LBS | BODY MASS INDEX: 29.27 KG/M2 | SYSTOLIC BLOOD PRESSURE: 125 MMHG | DIASTOLIC BLOOD PRESSURE: 61 MMHG | HEIGHT: 68 IN | HEART RATE: 89 BPM

## 2024-09-24 DIAGNOSIS — E11.49 TYPE II DIABETES MELLITUS WITH NEUROLOGICAL MANIFESTATIONS: Primary | ICD-10-CM

## 2024-09-24 DIAGNOSIS — M20.41 HAMMER TOES OF BOTH FEET: ICD-10-CM

## 2024-09-24 DIAGNOSIS — M20.11 HALLUX ABDUCTO VALGUS, RIGHT: ICD-10-CM

## 2024-09-24 DIAGNOSIS — M20.42 HAMMER TOES OF BOTH FEET: ICD-10-CM

## 2024-09-24 DIAGNOSIS — M20.12 HALLUX ABDUCTO VALGUS, LEFT: ICD-10-CM

## 2024-09-24 PROCEDURE — 99213 OFFICE O/P EST LOW 20 MIN: CPT | Mod: S$GLB,,, | Performed by: PODIATRIST

## 2024-09-24 PROCEDURE — 3074F SYST BP LT 130 MM HG: CPT | Mod: CPTII,S$GLB,, | Performed by: PODIATRIST

## 2024-09-24 PROCEDURE — 1157F ADVNC CARE PLAN IN RCRD: CPT | Mod: CPTII,S$GLB,, | Performed by: PODIATRIST

## 2024-09-24 PROCEDURE — 3078F DIAST BP <80 MM HG: CPT | Mod: CPTII,S$GLB,, | Performed by: PODIATRIST

## 2024-09-24 PROCEDURE — 1126F AMNT PAIN NOTED NONE PRSNT: CPT | Mod: CPTII,S$GLB,, | Performed by: PODIATRIST

## 2024-09-24 PROCEDURE — 1159F MED LIST DOCD IN RCRD: CPT | Mod: CPTII,S$GLB,, | Performed by: PODIATRIST

## 2024-09-24 PROCEDURE — 99999 PR PBB SHADOW E&M-EST. PATIENT-LVL V: CPT | Mod: PBBFAC,,, | Performed by: PODIATRIST

## 2024-09-24 PROCEDURE — 1160F RVW MEDS BY RX/DR IN RCRD: CPT | Mod: CPTII,S$GLB,, | Performed by: PODIATRIST

## 2024-09-24 NOTE — TELEPHONE ENCOUNTER
Spoke with patient. Patient states that dropped off paperwork and wanted to know if we have received it. Patient was advised that we have received the paperwork and is awaiting for provider's review. Patient verbalized understanding and does not have any other questions or concerns at this time.

## 2024-09-24 NOTE — TELEPHONE ENCOUNTER
----- Message from Little Ramirez sent at 9/24/2024  9:31 AM CDT -----  Name of Who is calling :JAILENE JIMENES [[id number]]        What is the request in detail:Pt would like a call back in regards to his medications. Please assist         Can the clinic reply by [facility]:no            What [facility] [phone]

## 2024-09-25 ENCOUNTER — TELEPHONE (OUTPATIENT)
Dept: FAMILY MEDICINE | Facility: CLINIC | Age: 84
End: 2024-09-25
Payer: MEDICARE

## 2024-09-25 NOTE — TELEPHONE ENCOUNTER
----- Message from Marquita Reynaga sent at 9/25/2024  7:29 AM CDT -----  Type: Patient Call Back    Who called: self     What is the request in detail: patient stated that he was given these medication carvediloL (COREG) 3.125 MG tablet and omeprazole (PRILOSEC) 20 MG capsule when he was in the hospital would like to know if he needs to continue taking these medication. Please call        Can the clinic reply by [facility]? No     Would the patient rather a call back or a response via [facility]?  call    Best call back number: [phone]    Additional Information:

## 2024-09-27 ENCOUNTER — TELEPHONE (OUTPATIENT)
Dept: FAMILY MEDICINE | Facility: CLINIC | Age: 84
End: 2024-09-27
Payer: MEDICARE

## 2024-09-27 DIAGNOSIS — I12.9 BENIGN HYPERTENSION WITH CHRONIC KIDNEY DISEASE, STAGE III: Primary | ICD-10-CM

## 2024-09-27 DIAGNOSIS — N18.30 BENIGN HYPERTENSION WITH CHRONIC KIDNEY DISEASE, STAGE III: Primary | ICD-10-CM

## 2024-09-27 RX ORDER — OMEPRAZOLE 20 MG/1
20 CAPSULE, DELAYED RELEASE ORAL DAILY
Qty: 30 CAPSULE | Refills: 2 | Status: CANCELLED | OUTPATIENT
Start: 2024-09-27 | End: 2024-12-26

## 2024-09-27 RX ORDER — VALSARTAN 40 MG/1
40 TABLET ORAL DAILY
Qty: 90 TABLET | Refills: 0 | Status: SHIPPED | OUTPATIENT
Start: 2024-09-27

## 2024-09-27 RX ORDER — CARVEDILOL 3.12 MG/1
3.12 TABLET ORAL 2 TIMES DAILY WITH MEALS
Qty: 180 TABLET | Refills: 0 | Status: SHIPPED | OUTPATIENT
Start: 2024-09-27

## 2024-09-27 NOTE — TELEPHONE ENCOUNTER
See previous. Spoke with patient. Patient declines any heartburn or abdominal pain.  Patient verbalized understanding. Patient has no other questions or concerns at this time.

## 2024-09-27 NOTE — TELEPHONE ENCOUNTER
I refilled the coreg and valsartan.   If he is not having abdominal pain or heartburn he does not need the omeprazole.  He needs to make sure to keep his appointment with NP in November.

## 2024-09-27 NOTE — TELEPHONE ENCOUNTER
----- Message from Rabia Pelaez sent at 9/27/2024  8:19 AM CDT -----  Regarding: Self  Type: Patient Call Back     What is the request in detail:Pt is requesting a call back from nurse to find out what he needs to do with his medication . Pt states pharmacy isnt going to fill it without the Drs approval     Can the clinic reply by [facility]? No     Would the patient rather a call back or a response via [facility]? Call back    Best call back number: .[phone]      Additional Information:    Thank you.

## 2024-09-27 NOTE — TELEPHONE ENCOUNTER
Spoke with patient. Patient states that he was prescribed carvedilol, prilosec and  valsartan when he was in the emergency room. Patient requesting to know if he still needs to take the medication. Patient states if he should be, he would need refills on the three medication.

## 2024-09-27 NOTE — TELEPHONE ENCOUNTER
----- Message from Дмитрий Bates sent at 9/27/2024  3:21 PM CDT -----  Regarding: Percy  Type:  Patient Returning Call     Who Called: Percy     Who Left Message for Patient: Nate Saldivar     Does the patient know what this is regarding?:Yes     Would the patient rather a call back or a response via [facility]? Callback     Best Call Back Number:.[phone]       Additional Information:

## 2024-09-27 NOTE — TELEPHONE ENCOUNTER
----- Message from Дмитрий Bates sent at 9/26/2024 12:28 PM CDT -----  Regarding: Percy  Type:  Patient Returning Call     Who Called: Percy     Who Left Message for Patient: Carmenza Etienne     Does the patient know what this is regarding?:Medication     Would the patient rather a call back or a response via [facility]? Callback     Best Call Back Number:.[phone]       Additional Information:

## 2024-09-30 ENCOUNTER — TELEPHONE (OUTPATIENT)
Dept: FAMILY MEDICINE | Facility: CLINIC | Age: 84
End: 2024-09-30
Payer: MEDICARE

## 2024-09-30 NOTE — TELEPHONE ENCOUNTER
----- Message from Marquita sent at 9/30/2024  8:13 AM CDT -----  Type:  Needs Medical Advice/Symptom-based Call    Who Called: self     Symptoms (please be specific): brush burn on tale bone    How long has patient had these symptoms:  3/4 days     Would the patient rather a call back or a response via [facility]? Call    Best Call Back Number: .[phone]

## 2024-10-01 ENCOUNTER — TELEPHONE (OUTPATIENT)
Dept: FAMILY MEDICINE | Facility: CLINIC | Age: 84
End: 2024-10-01
Payer: MEDICARE

## 2024-10-01 ENCOUNTER — OFFICE VISIT (OUTPATIENT)
Dept: PULMONOLOGY | Facility: CLINIC | Age: 84
End: 2024-10-01
Payer: MEDICARE

## 2024-10-01 VITALS
HEART RATE: 85 BPM | HEIGHT: 68 IN | SYSTOLIC BLOOD PRESSURE: 114 MMHG | WEIGHT: 195.56 LBS | BODY MASS INDEX: 29.64 KG/M2 | DIASTOLIC BLOOD PRESSURE: 66 MMHG | OXYGEN SATURATION: 87 %

## 2024-10-01 DIAGNOSIS — J84.9 ILD (INTERSTITIAL LUNG DISEASE): Primary | ICD-10-CM

## 2024-10-01 DIAGNOSIS — G47.33 OBSTRUCTIVE SLEEP APNEA TREATED WITH BIPAP: ICD-10-CM

## 2024-10-01 DIAGNOSIS — Z71.89 GOALS OF CARE, COUNSELING/DISCUSSION: ICD-10-CM

## 2024-10-01 DIAGNOSIS — J43.2 CENTRILOBULAR EMPHYSEMA: ICD-10-CM

## 2024-10-01 DIAGNOSIS — I50.42 CHRONIC COMBINED SYSTOLIC AND DIASTOLIC HEART FAILURE: ICD-10-CM

## 2024-10-01 PROCEDURE — 1100F PTFALLS ASSESS-DOCD GE2>/YR: CPT | Mod: CPTII,S$GLB,, | Performed by: INTERNAL MEDICINE

## 2024-10-01 PROCEDURE — 3074F SYST BP LT 130 MM HG: CPT | Mod: CPTII,S$GLB,, | Performed by: INTERNAL MEDICINE

## 2024-10-01 PROCEDURE — 1159F MED LIST DOCD IN RCRD: CPT | Mod: CPTII,S$GLB,, | Performed by: INTERNAL MEDICINE

## 2024-10-01 PROCEDURE — 3078F DIAST BP <80 MM HG: CPT | Mod: CPTII,S$GLB,, | Performed by: INTERNAL MEDICINE

## 2024-10-01 PROCEDURE — 1125F AMNT PAIN NOTED PAIN PRSNT: CPT | Mod: CPTII,S$GLB,, | Performed by: INTERNAL MEDICINE

## 2024-10-01 PROCEDURE — G2211 COMPLEX E/M VISIT ADD ON: HCPCS | Mod: S$GLB,,, | Performed by: INTERNAL MEDICINE

## 2024-10-01 PROCEDURE — 1157F ADVNC CARE PLAN IN RCRD: CPT | Mod: CPTII,S$GLB,, | Performed by: INTERNAL MEDICINE

## 2024-10-01 PROCEDURE — 99999 PR PBB SHADOW E&M-EST. PATIENT-LVL IV: CPT | Mod: PBBFAC,,, | Performed by: INTERNAL MEDICINE

## 2024-10-01 PROCEDURE — 3288F FALL RISK ASSESSMENT DOCD: CPT | Mod: CPTII,S$GLB,, | Performed by: INTERNAL MEDICINE

## 2024-10-01 PROCEDURE — 99214 OFFICE O/P EST MOD 30 MIN: CPT | Mod: S$GLB,,, | Performed by: INTERNAL MEDICINE

## 2024-10-01 NOTE — PROGRESS NOTES
Percy Ramirez  was seen as a follow up.        HISTORY OF PRESENT ILLNESS: Percy Ramirez is a 83 y.o. male with pmh of tobacco abuse, cad, tyler, htn, dyslipidemia, dm2 is here for pulmonary evaluation.    Our first encounter was 1/24/13.  At that time, patient with chronic dyspnea.  However, patient stated symptoms have gradually worsen since 2012.  +pelletier x 1/2.  No fever/chills.  No wheezing.  No coughing.  No orthopnea.  No pnd.  Patient with h/o 3 ppd x 40 years.  Quit smoking 2004.  PFT with moderate-severe obstruction with FEV1 55% (6/14/19)  In the past, patient was prescribed spiriva.  However, patient did not filled due to high copay cost.  Patient underwent LDCT screening 2/11/19 by Dr. Edmondson demonstrated several ggo and diffuse emphysematous changes.  Repeat CT 6/28/22 without acute changes.      Patient was getting patient assistant from [employer] for Trelegy.  Patient was switched to St[employer].  Per patient, patient assistant program for Trelegy was too complicated.  Doing well Stiolto, albuterol and oxygen.  Have been using albuterol 3 times daily.  No fever/chil.  No chest .  Still with significant dyspnea.  Breathing is better with Stiolto.  No coughing or wheezing.   Patient is living his son.      S/p split study at [facility] 2017 with ahi of 32.  S/p retitration 2019 with effective control of bipap 16/10.  Using bipap nightly without issue.    Recent hospitalization 6/2024 for cap.  Patient was discharged with home health.  Doing well since hospital discharge.        PAST MEDICAL HISTORY:    Active Ambulatory Problems     Diagnosis Date Noted    Major depressive disorder, recurrent episode, mild     Benign hypertension with chronic kidney disease, stage III     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     Coronary artery disease     S/P CABG x 2     Macular degeneration     Obstructive sleep apnea treated with BiPAP 07/27/2012    Anxiety state, unspecified 10/24/2013    Insulin long-term  use 02/16/2015    Primary osteoarthritis of both knees 02/25/2015    Chronic low back pain 02/25/2015    DJD (degenerative joint disease), lumbar 04/30/2015    Poorly controlled type 2 diabetes mellitus with neuropathy 07/29/2016    Bilateral carotid artery disease 07/29/2016    Hyperlipidemia LDL goal <100 07/29/2016    Type 2 diabetes, with peripheral circulatory disorder not at goal 07/29/2016    Atherosclerosis of abdominal aorta 07/29/2016    Overweight (BMI 25.0-29.9) 07/29/2016    Persistent atrial fibrillation 03/06/2017    Chronic stable angina 03/06/2017    Senile purpura 03/06/2017    Goals of care, counseling/discussion 10/04/2017    Osteoarthritis of carpometacarpal (CMC) joint of left thumb 11/22/2017    MCI (mild cognitive impairment) 06/18/2018    Dizziness and giddiness 02/11/2019    Pulmonary nodule 03/20/2019    Dyspnea on exertion 03/20/2019    Chronic combined systolic and diastolic heart failure 11/30/2018    Walker as ambulation aid 11/06/2019    ILD (interstitial lung disease) 05/12/2020    History of cardioversion 09/23/2020    Erectile dysfunction 01/24/2020    Secondary hyperparathyroidism of renal origin 11/06/2020    Noncompliance with medication regimen 11/06/2020    Exudative age-related macular degeneration, right eye, with active choroidal neovascularization 03/29/2022    History of stroke 04/04/2022    Chronic tension-type headache, intractable 04/08/2022    Spinal stenosis of lumbar region with neurogenic claudication 02/24/2023    Lumbar radiculopathy 02/24/2023    Lumbar spondylosis 02/24/2023    DDD (degenerative disc disease), lumbar 02/24/2023    Cerebral atherosclerosis 11/13/2023    Chronic kidney disease, stage 3a 05/03/2024    Ear pressure, bilateral 06/24/2024    Dysphagia 06/25/2024    Advance care planning 06/25/2024    Centrilobular emphysema 10/01/2024     Resolved Ambulatory Problems     Diagnosis Date Noted    Combined hyperlipidemia associated with type 2 diabetes  mellitus     Fatigue 07/27/2012    Depressive disorder, not elsewhere classified 10/24/2013    Arrhythmia 12/30/2013    Dizziness of unknown cause 12/30/2013    Hypotension 06/13/2014    Hyponatremia 06/14/2014    Diabetic retinopathy of both eyes 10/23/2014    Diabetic neuropathy 02/25/2015    Poor motor control of trunk 03/11/2015    IT band syndrome 03/11/2015    Impairment of balance 03/11/2015    COPD with acute exacerbation 07/29/2016    Uncontrolled type 2 diabetes mellitus with hyperglycemia 07/29/2016    Medication intolerance 09/26/2019    Drug-induced immunodeficiency 08/10/2022    Pneumonia of right lower lobe due to infectious organism 06/25/2024    Sepsis without acute organ dysfunction 06/25/2024     Past Medical History:   Diagnosis Date    Allergy     Arthritis     CAD, multiple vessel     Cataract     Depression     History of colon polyps     Hyperlipidemia     Hypertension     Type 2 diabetes mellitus with circulatory disorder                 PAST SURGICAL HISTORY:    Past Surgical History:   Procedure Laterality Date    broken elbow      left    COLONOSCOPY N/A 10/4/2017    Procedure: COLONOSCOPY;  Surgeon: Gabriel Leung MD;  Location: [facility];  Service: Endoscopy;  Laterality: N/A;    EYE SURGERY      cataract    heart bypass      2004    HERNIA REPAIR      right    ROTATOR CUFF REPAIR      right  2004         FAMILY HISTORY:                Family History   Problem Relation Name Age of Onset    Arthritis Mother      Diabetes Mother      Stroke Mother      Heart attack Father      Cancer Brother      Throat cancer Brother      Diabetes Maternal Aunt      Diabetes Maternal Uncle      Heart disease Maternal Uncle      Diabetes Paternal Aunt      Heart disease Paternal Aunt      Heart disease Paternal Uncle      Heart disease Maternal Grandmother      Cancer Maternal Grandfather         SOCIAL HISTORY:          Tobacco:   Social History     Tobacco Use   Smoking Status Former    Current  "packs/day: 0.00    Average packs/day: 3.0 packs/day for 45.0 years (135.0 ttl pk-yrs)    Types: Cigarettes    Start date: 1959    Quit date: 2004    Years since quittin.4   Smokeless Tobacco Former       alcohol use:    Social History     Substance and Sexual Activity   Alcohol Use Yes    Comment: was heavy drinker 35 years ago, rare use now                 Occupation:  Retired     ALLERGIES:    Review of patient's allergies indicates:   Allergen Reactions    Aricept odt [donepezil] Diarrhea and Nausea And Vomiting    Codeine Nausea Only     weak    Ranexa [ranolazine]        CURRENT MEDICATIONS:    Current Outpatient Medications   Medication Sig Dispense Refill    acetaminophen (TYLENOL) 325 MG tablet Take 2 tablets (650 mg total) by mouth every 6 (six) hours as needed. 13 tablet 0    albuterol (PROVENTIL) 2.5 mg /3 mL (0.083 %) nebulizer solution Take 3 mLs (2.5 mg total) by nebulization every 6 to 8 hours as needed for Wheezing. 100 mL 3    albuterol (PROVENTIL/VENTOLIN HFA) 90 mcg/actuation inhaler INHALE 2 PUFFS BY MOUTH EVERY 6 HOURS AS NEEDED FOR WHEEZING OR  SHORTNESS  OF  BREATH 8 g 3    BD INSULIN PEN NEEDLE UF SHORT 31 gauge x 5/16" Ndle       BD INSULIN SYRINGE ULTRA-FINE 1/2 mL 31 gauge x 15/64" Syrg       blood sugar diagnostic Strp To check BG 3 times daily, to use with insurance preferred meter 200 strip 11    carvediloL (COREG) 3.125 MG tablet Take 1 tablet (3.125 mg total) by mouth 2 (two) times daily with meals. 180 tablet 0    ceramides 1,3,6-II (CERAVE DAILY MOISTURIZING) Lotn Apply twice daily to skin 355 mL 1    cinnamon bark 500 mg capsule Take 1,000 mg by mouth once daily.        clopidogreL (PLAVIX) 75 mg tablet Take 75 mg by mouth.      diclofenac sodium (VOLTAREN) 1 % Gel Apply 2 g topically 3 (three) times daily. 50 g 0    DULoxetine (CYMBALTA) 60 MG capsule Take 1 capsule (60 mg total) by mouth once daily. 90 capsule 3    fluticasone propionate (FLONASE) " "50 mcg/actuation nasal spray 1 spray (50 mcg total) by Each Nostril route 2 (two) times daily. 18.2 mL 3    furosemide (LASIX) 40 MG tablet Take 40 mg by mouth once daily.      gabapentin (NEURONTIN) 300 MG capsule Take 4 capsules (1,200 mg total) by mouth 2 (two) times daily. 540 capsule 1    glimepiride (AMARYL) 4 MG tablet Take 4 mg by mouth daily with breakfast.       HYDROcodone-acetaminophen (NORCO) 7.5-325 mg per tablet Take 1 tablet by mouth every 8 (eight) hours as needed for Pain. 90 tablet 0    [START ON 10/4/2024] HYDROcodone-acetaminophen (NORCO) 7.5-325 mg per tablet Take 1 tablet by mouth every 8 (eight) hours as needed for Pain. 90 tablet 0    [START ON 11/3/2024] HYDROcodone-acetaminophen (NORCO) 7.5-325 mg per tablet Take 1 tablet by mouth every 8 (eight) hours as needed for Pain. 90 tablet 0    insulin NPH-insulin regular, 70/30, (NOVOLIN 70/30) 100 unit/mL (70-30) injection Inject into the skin. Inject 10 units at breakfast and inject 10 units at night      insulin syringe-needle U-100 0.3 mL 31 gauge x 15/64" Syrg USE TO INJECT INSULIN THREE TIMES DAILY      insulin syringe-needle U-100 1/2 mL 31 x 5/16" Syrg       ipratropium (ATROVENT) 42 mcg (0.06 %) nasal spray 2 sprays by Nasal route every 6 (six) hours as needed for Rhinitis (use as needed for runny nose and also use 15 minutes prior to meal). Clean nose with saline prior to use 15 mL 3    isosorbide mononitrate (IMDUR) 30 MG 24 hr tablet Take 30 mg by mouth once daily.      lancets [facility] To check BG 3 times daily, to use with insurance preferred meter 200 each 11    meclizine (ANTIVERT) 12.5 mg tablet Take 1 tablet (12.5 mg total) by mouth 3 (three) times daily as needed for Dizziness. 90 tablet 0    nitroGLYCERIN (NITROSTAT) 0.4 MG SL tablet DISSOLVE 1 TABLET UNDER THE TONGUE EVERY 5 MINUTES AS  NEEDED FOR CHEST PAIN. MAX  OF 3 TABLETS IN 15 MINUTES. CALL 911 IF PAIN PERSISTS.      pravastatin (PRAVACHOL) 20 MG tablet Take 1 tablet (20 mg " total) by mouth once daily. 90 tablet 1    spironolactone (ALDACTONE) 25 MG tablet Take 0.5 tablets (12.5 mg total) by mouth once daily.      tiotropium-olodateroL (STIOLTO RESPIMAT) 2.5-2.5 mcg/actuation Mist Inhale 2 puffs into the lungs once daily. Controller 1 g 11    triamcinolone acetonide 0.1% (KENALOG) 0.1 % cream SMARTSI Application Topical 2-3 Times Daily      VIT C/VIT E AC/LUT/COPPER/ZINC (PRESERVISION LUTEIN ORAL) Take by mouth.      warfarin (COUMADIN) 5 MG tablet Take 2 tabs by mouth on Tuesday,Thursday, Saturday and Sundays then 1.5 on all other days.      atorvastatin (LIPITOR) 40 MG tablet Take 40 mg by mouth once daily. (Patient not taking: Reported on 10/1/2024)      atorvastatin (LIPITOR) 40 MG tablet Take 1 tablet by mouth every morning. (Patient not taking: Reported on 10/1/2024)      LIDOcaine (LIDODERM) 5 % Place 1 patch onto the skin once daily. Remove & Discard patch within 12 hours or as directed by MD (Patient not taking: Reported on 10/1/2024) 5 patch 1    methocarbamoL (ROBAXIN) 500 MG Tab TAKE 1 TABLET BY MOUTH 4 TIMES DAILY FOR 10 DAYS (Patient not taking: Reported on 10/1/2024) 60 tablet 0    mupirocin (BACTROBAN) 2 % ointment Apply topically 3 (three) times daily. (Patient not taking: Reported on 10/1/2024) 1 g 1    omeprazole (PRILOSEC) 20 MG capsule Take 1 capsule (20 mg total) by mouth once daily. 30 capsule 2    valsartan (DIOVAN) 40 MG tablet Take 1 tablet (40 mg total) by mouth once daily. (Patient not taking: Reported on 10/1/2024) 90 tablet 0     No current facility-administered medications for this visit.                  REVIEW OF SYSTEMS:     Pulmonary related symptoms as per HPI.  Gen:  no weight loss, no fever, no night sweat  HEENT:  + visual disturbance from macular degenration, no sore throat, + hearing acuity  CV:  No chest pain, no orthopnea, no PND  GI:  no melena, no hematochezia, no diarhea, no constipation.  Hernia pain left groin.  :  no dysuria, no  "hematuria, no hesistancy, no dribbling  Neuro:  no syncope, no vertigo, no tinitus  Psych:  No homocide or suicide ideation; no depression.  Endocrine:  No heat or cold intolerance.  Sleep:  No snoring; no witnessed apnea.  Otherwise, a balance of systems reviewed is negative.                 PHYSICAL EXAM:  Vitals:    10/01/24 0856   BP: 114/66   Pulse: 85   SpO2: (!) 87%   Weight: 88.7 kg (195 lb 8.8 oz)   Height: 5' 8" (1.727 m)   PainSc: 10-Worst pain ever   PainLoc: Knee     Body mass index is 29.73 kg/m².     Physical Exam:   General: NAD, cooperative & interactive.  HEENT: AT/NC, PERRL, EOMI, nasal and oral mucosa moist. Normal dentition. Oropharynx without exudate.   Neck: Supple without JVD. Trachea midline. Thyroid feels normal.   Cardiac: S1S2 with brisk cap refill and symmetric pulses in distal extremities.  Respiratory: Normal inspection. Symmetric chest rise. Normal palpation and percussion. Auscultation clear bilaterally. No increased work of breathing noted.   Abdomen: Soft, NT/ND. +BS. No palpable masses. No hepatosplenomegaly.   Lymphatic: No palpable supraclavicular or cervical LAD.   Extremities: Normal strength and tone (grossly). No visible atrophy. No clubbing or cyanosis on nail exam.   Skin: Warm and dry without visible rash.   Neuro: Grossly intact to brief exam. Oriented with appropriate mood & affect.       LABS  Pulmonary Functions Testing Results:    1/31/13 tlc 93%; dlco 46%  4/8/14 ratio 67%; fvc 76%; fve 73%  6/14/19 ratio of 48%; fvc 85%; fev1 55%  PFT 5/15/20 ratio of 54%; fev1 1.6 L (57%); fvc 2.97 L ( 80%); tlc.82 L( 77%); dlco 8.23 (34%)  PFT 8/29/22 Ratio of 48%; FVC 2.96 L (81%); FEV1 1.42 L (52%); TLC 4.92 L (79%); dlco 7.57 (32%)   PFT 5/29/24 Ratio of 54%; FVC 2.99 L (83%); FEV1 1.61 L (60%); TLC 6.37 L (95%); dlco 5.6 (23%)         6 min 5/15/20 165 96-89-91 on room     ABG: none  CXR:   Lungs clear  CT CHEST:  2/15/19 rll ggo and tib in lingula.    5/4/19 (personally " reviewed) increased reticulation at bases; similar to prior ct 2/15/19; +emphysematous changes.      PPD none          Split night study from  3/21/2017: AHI 31.7, Effective control acieved with cpap 14 cm H2O  CPAP titration study 8/17/2019:  Effective control of sleep disordered breathing was achieved with BPAP at 16/10 cm of water.     2-d echo 12/8/20  TDS     Increased left ventricular wall thickness, decreased left ventricular systolic function.     Left ventricular ejection fraction is estimated at 30-40 %.  Recommend MUGA     Indeterminate diastolic function.     Normal right ventricular size, decreased right ventricular systolic function.     Right ventricular systolic pressure 69.7 mmHg.  Severe pulmonary hypertension.     Mildly increased left atrial size.     Mitral annular calcification.  Mild mitral valve regurgitation.     Aortic valve sclerosis.     bnp 8/5/10 134;1/24/13 164    6 min walk 350 meters; no significant desaturation    ASSESSMENT  Problem List Items Addressed This Visit       Centrilobular emphysema    Overview     - Quit smoking since 1960s.   -fev1 52%  -Patient is up to date with vaccination.    -Declines pulmonary rehab  -On home oxygen which helps  -continue with Stiolto  -walker and wheelchair dependant.         Chronic combined systolic and diastolic heart failure    Overview     -EF 30-40%  follow by Dr. Valderrama         Goals of care, counseling/discussion    Overview     5/1/23  Advance Care Planning   During this visit, I engaged the patient in the advance care planning process.  The patient and I reviewed the role for advance directives and their purpose in directing future healthcare if the patient's unable to speak for him/herself.  At this point in time, the patient does have full decision-making capacity.  We discussed different extreme health states that patient could experience, and reviewed what kind of medical care patient would want in those situations.  The patient  communicated that if he was comatose and had little chance of a meaningful recovery, he would NOT want machines/life-sustaining treatments used.  In addition to the above preference, she/he patient  HAS completed a living will to reflect these preferences.  The patient HAS already designated, Kalyani Felder, as healthcare power of  to make decisions on her behalf.  In the case of cardiopulmonary arrest, patient does wish for CPR, intubation or cardioversion    Living is [facility] to [facility] 7/2021         ILD (interstitial lung disease) - Primary    Overview     -Emphysematous changes on ct along with basilar reticulation.  pft with moderate obstructive and mild restrictive physiology.  dlco significantly reduced (pt on home oxygen)  Pft with improving fvc and tlc         Obstructive sleep apnea treated with BiPAP    Overview     ahi of 31.7.  Patient is using and benefiting from bipap 16/10.                          Patient will No follow-ups on file.    Visit today included increased complexity associated with the care of the episodic problem copd, tyler addressed and managing the longitudinal care of the patient due to the serious and/or complex managed problem(s) copd,tyler.        25 minutes of total time spent on the encounter, which includes face to face time and non-face to face time preparing to see the patient (eg, review of tests), Obtaining and/or reviewing separately obtained history, documenting clinical information in the electronic or other health record, independently interpreting results (not separately reported) and communicating results to the patient/family/caregiver, or Care coordination (not separately reported).

## 2024-10-01 NOTE — TELEPHONE ENCOUNTER
----- Message from Tech Brigitte sent at 10/1/2024 12:26 PM CDT -----  Regarding: Patient call back  .Type: Patient Call Back    Who called:    What is the request in detail:states he cant go to see the doctor for his heartburn because he doesn't have transportation and his heart burn is not frequent enough     Can the clinic reply by [facility]?no    Would the patient rather a call back or a response via [facility]? Call     Best call back number:.[phone]      Additional Information:

## 2024-10-01 NOTE — TELEPHONE ENCOUNTER
Spoke with patient. Patient states that he is not using any gastro reflux medication anymore and wants to cancel appointment with gastroenterology. Patient states he is not taking anything for an upset stomach and wants to cancel appointment on 11/22/2024. Appointment canceled. Patient has no other questions or concerns at this time.

## 2024-10-09 ENCOUNTER — TELEPHONE (OUTPATIENT)
Dept: FAMILY MEDICINE | Facility: CLINIC | Age: 84
End: 2024-10-09
Payer: MEDICARE

## 2024-10-09 NOTE — TELEPHONE ENCOUNTER
Please advise patient that he is up-to-date on pneumonia vaccinations.  Since he had 1 after age 67 he no longer will get any pneumonia vaccinations going forward.  There are certainly other pneumonias not covered by the vaccine, which is likely what he caught this summer.

## 2024-10-09 NOTE — TELEPHONE ENCOUNTER
Spoke with patient. Patient was advised that he is not due for any vaccines at this time. Patient states that he got pneumonia in June and requesting to know when he should get another dose.

## 2024-10-09 NOTE — TELEPHONE ENCOUNTER
----- Message from Marquita sent at 10/9/2024 11:16 AM CDT -----  Type: Patient Call Back    Who called: self     What is the request in detail: patient would like to know if he need get the pneumonia. Please call     Can the clinic reply by [facility]? No     Would the patient rather a call back or a response via [facility]?  call    Best call back number: .T [phone]       Additional Information:

## 2024-10-16 ENCOUNTER — HOSPITAL ENCOUNTER (EMERGENCY)
Facility: HOSPITAL | Age: 84
Discharge: HOME | End: 2024-10-16
Attending: EMERGENCY MEDICINE
Payer: MEDICARE

## 2024-10-16 VITALS
RESPIRATION RATE: 16 BRPM | WEIGHT: 190 LBS | OXYGEN SATURATION: 98 % | BODY MASS INDEX: 28.79 KG/M2 | HEART RATE: 70 BPM | HEIGHT: 68 IN | DIASTOLIC BLOOD PRESSURE: 81 MMHG | SYSTOLIC BLOOD PRESSURE: 169 MMHG | TEMPERATURE: 98 F
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
## 2024-10-16 DIAGNOSIS — S06.5XAA SUBDURAL HEMATOMA: Primary | ICD-10-CM

## 2024-10-16 DIAGNOSIS — R42 DIZZINESS: ICD-10-CM

## 2024-10-16 PROBLEM — S06.5X0A TRAUMATIC SUBDURAL HEMATOMA WITHOUT LOSS OF CONSCIOUSNESS: Status: ACTIVE | Noted: 2024-10-16

## 2024-10-16 LAB
ABO + RH BLD: NORMAL
ALBUMIN SERPL BCP-MCNC: 3.7 G/DL (ref 3.5–5.2)
ALP SERPL-CCNC: 111 U/L (ref 55–135)
ALT SERPL W/O P-5'-P-CCNC: 15 U/L (ref 10–44)
ANION GAP SERPL CALC-SCNC: 7 MMOL/L (ref 8–16)
APTT PPP: 47 SEC (ref 21–32)
AST SERPL-CCNC: 17 U/L (ref 10–40)
BASOPHILS # BLD AUTO: 0.05 K/UL (ref 0–0.2)
BASOPHILS NFR BLD: 0.6 % (ref 0–1.9)
BILIRUB SERPL-MCNC: 0.5 MG/DL (ref 0.1–1)
BLD GP AB SCN CELLS X3 SERPL QL: NORMAL
BUN SERPL-MCNC: 32 MG/DL (ref 8–23)
CALCIUM SERPL-MCNC: 9.4 MG/DL (ref 8.7–10.5)
CHLORIDE SERPL-SCNC: 98 MMOL/L (ref 95–110)
CO2 SERPL-SCNC: 27 MMOL/L (ref 23–29)
CREAT SERPL-MCNC: 1.8 MG/DL (ref 0.5–1.4)
DIFFERENTIAL METHOD BLD: ABNORMAL
EOSINOPHIL # BLD AUTO: 0.3 K/UL (ref 0–0.5)
EOSINOPHIL NFR BLD: 3.1 % (ref 0–8)
ERYTHROCYTE [DISTWIDTH] IN BLOOD BY AUTOMATED COUNT: 13.9 % (ref 11.5–14.5)
EST. GFR  (NO RACE VARIABLE): 37 ML/MIN/1.73 M^2
GLUCOSE SERPL-MCNC: 294 MG/DL (ref 70–110)
HCT VFR BLD AUTO: 39 % (ref 40–54)
HGB BLD-MCNC: 13 G/DL (ref 14–18)
IMM GRANULOCYTES # BLD AUTO: 0.05 K/UL (ref 0–0.04)
IMM GRANULOCYTES NFR BLD AUTO: 0.6 % (ref 0–0.5)
INR PPP: 3.9 (ref 0.8–1.2)
LYMPHOCYTES # BLD AUTO: 1.2 K/UL (ref 1–4.8)
LYMPHOCYTES NFR BLD: 13.8 % (ref 18–48)
MCH RBC QN AUTO: 28.9 PG (ref 27–31)
MCHC RBC AUTO-ENTMCNC: 33.3 G/DL (ref 32–36)
MCV RBC AUTO: 87 FL (ref 82–98)
MONOCYTES # BLD AUTO: 0.9 K/UL (ref 0.3–1)
MONOCYTES NFR BLD: 9.7 % (ref 4–15)
NEUTROPHILS # BLD AUTO: 6.4 K/UL (ref 1.8–7.7)
NEUTROPHILS NFR BLD: 72.2 % (ref 38–73)
NRBC BLD-RTO: 0 /100 WBC
PLATELET # BLD AUTO: 210 K/UL (ref 150–450)
PMV BLD AUTO: 9.9 FL (ref 9.2–12.9)
POTASSIUM SERPL-SCNC: 4.9 MMOL/L (ref 3.5–5.1)
PROT SERPL-MCNC: 7.5 G/DL (ref 6–8.4)
PROTHROMBIN TIME: 39 SEC (ref 9–12.5)
RBC # BLD AUTO: 4.5 M/UL (ref 4.6–6.2)
SODIUM SERPL-SCNC: 132 MMOL/L (ref 136–145)
SPECIMEN OUTDATE: NORMAL
WBC # BLD AUTO: 8.78 K/UL (ref 3.9–12.7)

## 2024-10-16 PROCEDURE — 93005 ELECTROCARDIOGRAM TRACING: CPT

## 2024-10-16 PROCEDURE — 85025 COMPLETE CBC W/AUTO DIFF WBC: CPT

## 2024-10-16 PROCEDURE — 86901 BLOOD TYPING SEROLOGIC RH(D): CPT | Performed by: EMERGENCY MEDICINE

## 2024-10-16 PROCEDURE — 85730 THROMBOPLASTIN TIME PARTIAL: CPT

## 2024-10-16 PROCEDURE — 99284 EMERGENCY DEPT VISIT MOD MDM: CPT | Mod: ,,, | Performed by: PHYSICIAN ASSISTANT

## 2024-10-16 PROCEDURE — 86900 BLOOD TYPING SEROLOGIC ABO: CPT | Performed by: EMERGENCY MEDICINE

## 2024-10-16 PROCEDURE — 80053 COMPREHEN METABOLIC PANEL: CPT

## 2024-10-16 PROCEDURE — 85610 PROTHROMBIN TIME: CPT

## 2024-10-16 PROCEDURE — 99285 EMERGENCY DEPT VISIT HI MDM: CPT | Mod: 25

## 2024-10-16 PROCEDURE — 86850 RBC ANTIBODY SCREEN: CPT | Performed by: EMERGENCY MEDICINE

## 2024-10-16 PROCEDURE — 93010 ELECTROCARDIOGRAM REPORT: CPT | Mod: ,,, | Performed by: INTERNAL MEDICINE

## 2024-10-16 RX ORDER — ACETAMINOPHEN 500 MG
500 TABLET ORAL EVERY 6 HOURS PRN
Qty: 13 TABLET | Refills: 0 | Status: ON HOLD | OUTPATIENT
Start: 2024-10-16

## 2024-10-16 RX ORDER — LEVETIRACETAM 500 MG/1
500 TABLET ORAL 2 TIMES DAILY
Qty: 14 TABLET | Refills: 0 | Status: ON HOLD | OUTPATIENT
Start: 2024-10-16 | End: 2024-10-23

## 2024-10-16 NOTE — ASSESSMENT & PLAN NOTE
Percy Ramirez is a 83 y.o. male who was found to have a small 4mm right parafalcine SDH after a fall from his rollator this afternoon. He takes Warfarin for Afib. Alert and oriented. Exam at his baseline. C/o headache and neck pain. CT cervical is negative for acute fractures but he does have advanced degeneration.     -Hold all antiplatelet and anticoagulation therapy. No need to reverse warfarin for small parafalcine hemorrhage at this time.   -Repeat head CT at 6 hours. Call [facility] neurosurgery on call for review   -Cervical collar whenever upright or out of bed.  ---will evaluate neck pain at clinic FU with xrays before clearing    Assuming repeat head CT is stable, he may discharge from neurosurgery standpoint and FU in clinic in 2 weeks with an additional Head CT.   Hold warfarin and all OTC blood thinners until cleared by neurosurgery     Case discussed with Dr. Paz

## 2024-10-16 NOTE — CONSULTS
[facility] - Emergency Dept  Neurosurgery  Consult Note    Inpatient consult to Neurosurgery  Consult performed by: Myesha Berkowitz PA-C  Consult ordered by: Glenn Russell MD  Reason for consult: sdh        Subjective:     Chief Complaint/Reason for Admission: sdh    History of Present Illness: Percy Ramirez is a 83 y.o. male with macular degeneration, ILD on home O2, Afib on warfarin, and h/o CABGx2 who presented to the ED after a fall today. He was sitting on his rollator and being pulled backwards up a ramp, when the rollator hit a roughly 2in ledge and fell backwards. He hit his head on the ground but did not lose consciousness. Found to have a 4mm right parafalcine SDH on head CT. No significant mass effect or midline shift. He is alert and oriented. Endorsing some dizziness, headache, and posterior neck pain. Cervical CT is negative for acute fractures.     (Not in a hospital admission)      Review of patient's allergies indicates:   Allergen Reactions    Aricept odt [donepezil] Diarrhea and Nausea And Vomiting    Codeine Nausea Only     weak    Ranexa [ranolazine]        Past Medical History:   Diagnosis Date    Allergy     Arthritis     CAD, multiple vessel     DR Melissa    Cataract     Depression     History of colon polyps     Hyperlipidemia     Hypertension     Macular degeneration     Dr Razo for injection, Jennifer for eye    S/P CABG x 2     Type 2 diabetes mellitus with circulatory disorder     Dr. Aquino     Past Surgical History:   Procedure Laterality Date    broken elbow      left    COLONOSCOPY N/A 10/4/2017    Procedure: COLONOSCOPY;  Surgeon: Gabriel Leung MD;  Location: [facility];  Service: Endoscopy;  Laterality: N/A;    EYE SURGERY      cataract    heart bypass      2004    HERNIA REPAIR      right    ROTATOR CUFF REPAIR      right  2004     Family History       Problem Relation (Age of Onset)    Arthritis Mother    Cancer Brother, Maternal Grandfather    Diabetes Mother,  Maternal Aunt, Maternal Uncle, Paternal Aunt    Heart attack Father    Heart disease Maternal Uncle, Paternal Aunt, Paternal Uncle, Maternal Grandmother    Stroke Mother    Throat cancer Brother          Tobacco Use    Smoking status: Former     Current packs/day: 0.00     Average packs/day: 3.0 packs/day for 45.0 years (135.0 ttl pk-yrs)     Types: Cigarettes     Start date: 1959     Quit date: 2004     Years since quittin.5    Smokeless tobacco: Former   Substance and Sexual Activity    Alcohol use: Yes     Comment: was heavy drinker 35 years ago, rare use now    Drug use: No    Sexual activity: Yes     Partners: Female     Review of Systems   Gastrointestinal:  Blood in stool: left flank/ribs.   Musculoskeletal:  Positive for back pain, gait problem and neck pain.   Skin:  Positive for wound.   Neurological:  Positive for headaches.     Objective:     Weight: 86.2 kg (190 lb)  Body mass index is 28.89 kg/m².  Vital Signs (Most Recent):  Temp: 97.5 °F (36.4 °C) (10/16/24 1152)  Pulse: 73 (10/16/24 1407)  Resp: 18 (10/16/24 1407)  BP: (!) 159/74 (10/16/24 1407)  SpO2: 99 % (10/16/24 1407) Vital Signs (24h Range):  Temp:  [97.5 °F (36.4 °C)] 97.5 °F (36.4 °C)  Pulse:  [73-83] 73  Resp:  [12-20] 18  SpO2:  [87 %-100 %] 99 %  BP: (127-168)/(66-83) 159/74          Physical Exam  General: well developed, well nourished, no distress.   Head: normocephalic, atraumatic  Neurologic: Awake, Alert, Oriented x 4. Thought content appropriate.  GCS: Motor: 6/Verbal: 5/Eyes: 4 GCS Total: 15  Language: No aphasia  Speech: No dysarthria  Cranial nerves: face symmetric, tongue midline, CN II-XII grossly intact.   Eyes: pupils equal, round, reactive to light with accomodation, EOMI.  Pulmonary: normal respirations, no signs of respiratory distress    Sensory: intact to light touch throughout BUE and BLE; numbness present in bilateral feet, consistent with baseline neuropathy     Motor Strength: Moves all extremities  spontaneously with good tone.  Full strength upper and lower extremities. No abnormal movements seen.     Strength   Triceps Biceps   Hand    Upper: R  5/5 5/5   5/5    L  5/5 5/5   5/5     Iliopsoas   Tibialis  anterior Gastro- cnemius    Lower: R 5/5   5/5 5/5     L 5/5   5/5 5/5      Clonus: absent    Skin: Skin is warm, dry and intact.    Gait: rollator at baseline. Observed changing positions and getting back in bed with minimal assist     Pronator Drift: no drift noted  Finger-to-nose: Intact bilaterally      Significant Labs:  Recent Labs   Lab 10/16/24  1335   *   *   K 4.9   CL 98   CO2 27   BUN 32*   CREATININE 1.8*   CALCIUM 9.4     Recent Labs   Lab 10/16/24  1335   WBC 8.78   HGB 13.0*   HCT 39.0*        Recent Labs   Lab 10/16/24  1335   INR 3.9*   APTT 47.0*     Microbiology Results (last 7 days)       ** No results found for the last 168 hours. **              Significant Diagnostics:  I have personally reviewed imaging and agree with the findings.     Head CT 10/16/24  1. Thin right convexity and parafalcine subdural hematoma measuring up to 4 mm without significant mass effect.    Cervical spine CT 10/16/24   No acute cervical spine fracture.  Assessment/Plan:     Traumatic subdural hematoma without loss of consciousness  Percy Ramirez is a 83 y.o. male who was found to have a small 4mm right parafalcine SDH after a fall from his rollator this afternoon. He takes Warfarin for Afib. Alert and oriented. Exam at his baseline. C/o headache and neck pain. CT cervical is negative for acute fractures but he does have advanced degeneration.     -Hold all antiplatelet and anticoagulation therapy. No need to reverse warfarin for small parafalcine hemorrhage at this time.   -Repeat head CT at 6 hours. Call [facility] neurosurgery on call for review   -Cervical collar whenever upright or out of bed.  ---will evaluate neck pain at clinic FU with xrays before clearing    Assuming repeat head  CT is stable, he may discharge from neurosurgery standpoint and FU in clinic in 2 weeks with an additional Head CT.   Hold warfarin and all OTC blood thinners until cleared by neurosurgery     Case discussed with Dr. Paz         Thank you for your consult.     Myesha Berkowitz PA-C  Neurosurgery  [facility] - Emergency Dept   [facility]

## 2024-10-16 NOTE — SUBJECTIVE & OBJECTIVE
(Not in a hospital admission)      Review of patient's allergies indicates:   Allergen Reactions    Aricept odt [donepezil] Diarrhea and Nausea And Vomiting    Codeine Nausea Only     weak    Ranexa [ranolazine]        Past Medical History:   Diagnosis Date    Allergy     Arthritis     CAD, multiple vessel     DR Melissa    Cataract     Depression     History of colon polyps     Hyperlipidemia     Hypertension     Macular degeneration     Dr Razo for injection, Jennifer for eye    S/P CABG x 2     Type 2 diabetes mellitus with circulatory disorder     Dr. Aquino     Past Surgical History:   Procedure Laterality Date    broken elbow      left    COLONOSCOPY N/A 10/4/2017    Procedure: COLONOSCOPY;  Surgeon: Gabriel Leung MD;  Location: [facility] ENDO;  Service: Endoscopy;  Laterality: N/A;    EYE SURGERY      cataract    heart bypass      2004    HERNIA REPAIR      right    ROTATOR CUFF REPAIR      right  2004     Family History       Problem Relation (Age of Onset)    Arthritis Mother    Cancer Brother, Maternal Grandfather    Diabetes Mother, Maternal Aunt, Maternal Uncle, Paternal Aunt    Heart attack Father    Heart disease Maternal Uncle, Paternal Aunt, Paternal Uncle, Maternal Grandmother    Stroke Mother    Throat cancer Brother          Tobacco Use    Smoking status: Former     Current packs/day: 0.00     Average packs/day: 3.0 packs/day for 45.0 years (135.0 ttl pk-yrs)     Types: Cigarettes     Start date: 1959     Quit date: 2004     Years since quittin.5    Smokeless tobacco: Former   Substance and Sexual Activity    Alcohol use: Yes     Comment: was heavy drinker 35 years ago, rare use now    Drug use: No    Sexual activity: Yes     Partners: Female     Review of Systems   Gastrointestinal:  Blood in stool: left flank/ribs.   Musculoskeletal:  Positive for back pain, gait problem and neck pain.   Skin:  Positive for wound.   Neurological:  Positive for headaches.     Objective:      Weight: 86.2 kg (190 lb)  Body mass index is 28.89 kg/m².  Vital Signs (Most Recent):  Temp: 97.5 °F (36.4 °C) (10/16/24 1152)  Pulse: 73 (10/16/24 1407)  Resp: 18 (10/16/24 1407)  BP: (!) 159/74 (10/16/24 1407)  SpO2: 99 % (10/16/24 1407) Vital Signs (24h Range):  Temp:  [97.5 °F (36.4 °C)] 97.5 °F (36.4 °C)  Pulse:  [73-83] 73  Resp:  [12-20] 18  SpO2:  [87 %-100 %] 99 %  BP: (127-168)/(66-83) 159/74          Physical Exam  General: well developed, well nourished, no distress.   Head: normocephalic, atraumatic  Neurologic: Awake, Alert, Oriented x 4. Thought content appropriate.  GCS: Motor: 6/Verbal: 5/Eyes: 4 GCS Total: 15  Language: No aphasia  Speech: No dysarthria  Cranial nerves: face symmetric, tongue midline, CN II-XII grossly intact.   Eyes: pupils equal, round, reactive to light with accomodation, EOMI.  Pulmonary: normal respirations, no signs of respiratory distress    Sensory: intact to light touch throughout BUE and BLE; numbness present in bilateral feet, consistent with baseline neuropathy     Motor Strength: Moves all extremities spontaneously with good tone.  Full strength upper and lower extremities. No abnormal movements seen.     Strength   Triceps Biceps   Hand    Upper: R  5/5 5/5   5/5    L  5/5 5/5   5/5     Iliopsoas   Tibialis  anterior Gastro- cnemius    Lower: R 5/5   5/5 5/5     L 5/5   5/5 5/5      Clonus: absent    Skin: Skin is warm, dry and intact.    Gait: rollator at baseline. Observed changing positions and getting back in bed with minimal assist     Pronator Drift: no drift noted  Finger-to-nose: Intact bilaterally      Significant Labs:  Recent Labs   Lab 10/16/24  1335   *   *   K 4.9   CL 98   CO2 27   BUN 32*   CREATININE 1.8*   CALCIUM 9.4     Recent Labs   Lab 10/16/24  1335   WBC 8.78   HGB 13.0*   HCT 39.0*        Recent Labs   Lab 10/16/24  1335   INR 3.9*   APTT 47.0*     Microbiology Results (last 7 days)       ** No results found for the  last 168 hours. **              Significant Diagnostics:  I have personally reviewed imaging and agree with the findings.     Head CT 10/16/24  1. Thin right convexity and parafalcine subdural hematoma measuring up to 4 mm without significant mass effect.    Cervical spine CT 10/16/24   No acute cervical spine fracture.

## 2024-10-16 NOTE — HPI
Percy Ramirez is a 83 y.o. male with macular degeneration, ILD on home O2, Afib on warfarin, and h/o CABGx2 who presented to the ED after a fall today. He was sitting on his rollator and being pulled backwards up a ramp, when the rollator hit a roughly 2in ledge and fell backwards. He hit his head on the ground but did not lose consciousness. Found to have a 4mm right parafalcine SDH on head CT. No significant mass effect or midline shift. He is alert and oriented. Endorsing some dizziness, headache, and posterior neck pain. Cervical CT is negative for acute fractures.

## 2024-10-16 NOTE — DISCHARGE INSTRUCTIONS
Please do not take any blood thinners or aspirin or Plavix until cleared by Neurosurgery.  He will start taking Keppra twice a day for a week.  Please talk to your eye doctor about the upcoming procedure and let him know about the subdural hematoma found today.    Thank you for coming to our Emergency Department today. It is important to remember that some problems or medical conditions are difficult to diagnose and may not be found or addressed during your Emergency Department visit.  These conditions often start with non-specific symptoms and can only be diagnosed on follow up visits with your primary care physician or specialist when the symptoms continue or change. Please remember that all medical conditions can change, and we cannot predict how you will be feeling tomorrow or the next day. Return to the ER with any questions/concerns, new/concerning symptoms, worsening or failure to improve.       Be sure to follow up with your primary care doctor and review all labs/imaging/tests that were performed during your ER visit with them. It is very common for us to identify non-emergent incidental findings which must be followed up with your primary care physician.  Some labs/imaging/tests may be outside of the normal range, and require non-emergent follow-up and/or further investigation/treatment/procedures/testing to help diagnose/exclude/prevent complications or other potentially serious medical conditions. Some abnormalities may not have been discussed or addressed during your ER visit. Some lab results may not return during your ER visit but can be accessible by downloading the free [facility]ie or by visiting [email or web address] . It is important for you to review all labs/imaging/tests which are outside of the normal range with your physician.    An ER visit does not replace a primary care visit, and many screening tests or follow-up tests cannot be ordered by an ER doctor or performed by the ER.  Some tests may even require pre-approval.    If you do not have a primary care doctor, you may contact the one listed on your discharge paperwork or you may also call the [facility] Appointment Desk at [phone] , or [address] [place] [address] at  [phone] to schedule an appointment, or establish care with a primary care doctor or even a specialist and to obtain information about local resources. It is important to your health that you have a primary care doctor.    Please take all medications as directed. We have done our best to select a medication for you that will treat your condition however, all medications may potentially have side-effects and it is impossible to predict which medications may give you side-effects or what those side-effects (if any) those medications may give you.  If you feel that you are having a negative effect or side-effect of any medication you should stop taking those medications immediately and seek medical attention. If you feel that you are having a life-threatening reaction call 911.        Do not drive, swim, climb to height, take a bath, operate heavy machinery, drink alcohol or take potentially sedating medications, sign any legal documents or make any important decisions for 24 hours if you have received any pain medications, sedatives or mood altering drugs during your ER visit or within 24 hours of taking them if they have been prescribed to you.     You can find additional resources for Dentists, hearing aids, durable medical equipment, low cost pharmacies and other resources at [email or web address]

## 2024-10-16 NOTE — ED PROVIDER NOTES
Encounter Date: 10/16/2024       History     Chief Complaint   Patient presents with    Fall    Headache    Back Pain    Neck Pain     Pt presents to ER after falling at [facility] just PTA. Pt states his head, neck, and back are all hurting, pt states he fell backwards. Pt rates pain 10/10 at this time. Pt denies LOC. Pt currently on home O2 for COPD. Pt states he has had cardiac issues and is on blood thinners. Pt denies any other issues at this time.      83-year-old male with extensive medical history including COPD, CAD, DM, AFib, cardiac bypass presents to emergency department today after falling while at [facility].  He was sitting on his walker and was being pulled by a friend when they went over a lip of the ground and causing him to fall off the walker backwards hitting his head/neck/back.  He denies any loss consciousness.  He does reported baseline dizziness but states today is worse after fall.  He is reports pain 10/10.  Patient does take warfarin.  States he does 3.5 L of O2 at home.  Denies any chest pain, shortness of breath out from the ordinary, coughing, abdominal pain, N/V/D.        Review of patient's allergies indicates:   Allergen Reactions    Aricept odt [donepezil] Diarrhea and Nausea And Vomiting    Codeine Nausea Only     weak    Ranexa [ranolazine]      Past Medical History:   Diagnosis Date    Allergy     Arthritis     CAD, multiple vessel     DR Melissa    Cataract     Depression     History of colon polyps     Hyperlipidemia     Hypertension     Macular degeneration     Dr Razo for injection, Jennifer for eye    S/P CABG x 2     Type 2 diabetes mellitus with circulatory disorder     Dr. Aquino     Past Surgical History:   Procedure Laterality Date    broken elbow      left    COLONOSCOPY N/A 10/4/2017    Procedure: COLONOSCOPY;  Surgeon: Gabriel Leung MD;  Location: [facility];  Service: Endoscopy;  Laterality: N/A;    EYE SURGERY      cataract    heart bypass      2004    HERNIA  REPAIR      right    ROTATOR CUFF REPAIR      right  2004     Family History   Problem Relation Name Age of Onset    Arthritis Mother      Diabetes Mother      Stroke Mother      Heart attack Father      Cancer Brother      Throat cancer Brother      Diabetes Maternal Aunt      Diabetes Maternal Uncle      Heart disease Maternal Uncle      Diabetes Paternal Aunt      Heart disease Paternal Aunt      Heart disease Paternal Uncle      Heart disease Maternal Grandmother      Cancer Maternal Grandfather       Social History     Tobacco Use    Smoking status: Former     Current packs/day: 0.00     Average packs/day: 3.0 packs/day for 45.0 years (135.0 ttl pk-yrs)     Types: Cigarettes     Start date: 1959     Quit date: 2004     Years since quittin.5    Smokeless tobacco: Former   Substance Use Topics    Alcohol use: Yes     Comment: was heavy drinker 35 years ago, rare use now    Drug use: No     Review of Systems   Constitutional:  Negative for chills, fatigue and fever.   HENT:  Negative for sore throat.    Respiratory:  Negative for cough and shortness of breath.    Cardiovascular:  Negative for chest pain, palpitations and leg swelling.   Gastrointestinal:  Negative for abdominal pain, diarrhea, nausea and vomiting.   Genitourinary:  Negative for dysuria.   Musculoskeletal:  Positive for back pain and neck pain.   Skin:  Negative for rash and wound.   Neurological:  Positive for dizziness and headaches. Negative for speech difficulty, weakness and light-headedness.   Hematological:  Does not bruise/bleed easily.       Physical Exam     Initial Vitals [10/16/24 1152]   BP Pulse Resp Temp SpO2   127/66 83 20 97.5 °F (36.4 °C) (!) 87 %      MAP       --         Physical Exam    Nursing note and vitals reviewed.  Constitutional: He appears well-developed and well-nourished. No distress.   HENT:   Head: Normocephalic. Head is without raccoon's eyes, without Huston's sign, without abrasion and without  contusion.   Right Ear: Hearing and ear canal normal. No drainage.   Left Ear: Hearing and ear canal normal. No drainage.   Eyes: Conjunctivae and EOM are normal. Pupils are equal, round, and reactive to light. Right eye exhibits no discharge. Left eye exhibits no discharge.   Neck: Neck supple.   Mild tenderness to paraspinal muscles no midline tenderness   Normal range of motion.  Cardiovascular:  Normal rate and normal heart sounds. An irregularly irregular rhythm present.           Patient currently in AFib   Pulmonary/Chest: Effort normal and breath sounds normal. No respiratory distress. He has no decreased breath sounds. He has no wheezes.   Abdominal: Abdomen is soft. Bowel sounds are normal. He exhibits no distension and no mass. There is no abdominal tenderness. There is no rebound and no guarding.   Musculoskeletal:         General: Tenderness present. Normal range of motion.      Cervical back: Normal range of motion and neck supple. No rigidity. Muscular tenderness present. No spinous process tenderness. Normal range of motion.      Comments: No midline tenderness.  Tenderness to thoracic paraspinal muscles.     Neurological: He is alert and oriented to person, place, and time. He has normal strength.   Patient is alert and oriented to person, time, place.  He is holding full conversations and joking with me.  Patient is able to ambulate with only mild difficulty due to the increased dizziness.   Skin: Skin is warm and dry. No pallor.   No hematoma noted to skull, no swelling   Psychiatric: He has a normal mood and affect. His behavior is normal. Judgment and thought content normal.         ED Course   Procedures  Labs Reviewed   CBC W/ AUTO DIFFERENTIAL - Abnormal       Result Value    WBC 8.78      RBC 4.50 (*)     Hemoglobin 13.0 (*)     Hematocrit 39.0 (*)     MCV 87      MCH 28.9      MCHC 33.3      RDW 13.9      Platelets 210      MPV 9.9      Immature Granulocytes 0.6 (*)     Gran # (ANC) 6.4       Immature Grans (Abs) 0.05 (*)     Lymph # 1.2      Mono # 0.9      Eos # 0.3      Baso # 0.05      nRBC 0      Gran % 72.2      Lymph % 13.8 (*)     Mono % 9.7      Eosinophil % 3.1      Basophil % 0.6      Differential Method Automated     COMPREHENSIVE METABOLIC PANEL - Abnormal    Sodium 132 (*)     Potassium 4.9      Chloride 98      CO2 27      Glucose 294 (*)     BUN 32 (*)     Creatinine 1.8 (*)     Calcium 9.4      Total Protein 7.5      Albumin 3.7      Total Bilirubin 0.5      Alkaline Phosphatase 111      AST 17      ALT 15      eGFR 37 (*)     Anion Gap 7 (*)    PROTIME-INR - Abnormal    Prothrombin Time 39.0 (*)     INR 3.9 (*)    APTT - Abnormal    aPTT 47.0 (*)    TYPE & SCREEN    Group & Rh B POS      Indirect Fide NEG      Specimen Outdate 10/19/2024 23:59            Imaging Results              CT Head Without Contrast (Final result)  Result time 10/16/24 20:18:10      Final result by Ricardo Rivera MD (10/16/24 20:18:10)                   Impression:      Stable thin right parafalcine subdural hemorrhage without mass effect.  No significant detrimental change compared to earlier CT head from the same date.      Electronically signed by: Ricardo Rivera MD  Date:    10/16/2024  Time:    20:18               Narrative:    EXAMINATION:  CT HEAD WITHOUT CONTRAST    CLINICAL HISTORY:  Head trauma, minor (Age >= 65y);    TECHNIQUE:  Low dose axial images were obtained through the head.  Coronal and sagittal reformations were also performed. Contrast was not administered.    COMPARISON:  CT head from the same date at 13:14.    FINDINGS:  Stable thin right parafalcine subdural hemorrhage is seen without mass effect.  No evidence of new or worsening hemorrhage from earlier exam.  No evidence of intraparenchymal hemorrhage or acute/recent major vascular distribution infarction.  Ventricular system is stable in size.  Overall stable CT head compared to earlier exam from the same date.                                         CT Head Without Contrast (Final result)  Result time 10/16/24 13:46:59      Final result by Shade Do MD (10/16/24 13:46:59)                   Impression:      1. Thin right convexity and parafalcine subdural hematoma measuring up to 4 mm without significant mass effect.  2. No acute cervical spine fracture.  3. Additional details as above.  This report was flagged in Epic as abnormal.      Electronically signed by: Shade Do  Date:    10/16/2024  Time:    13:46               Narrative:    EXAMINATION:  CT HEAD WITHOUT CONTRAST; CT CERVICAL SPINE WITHOUT CONTRAST    CLINICAL HISTORY:  Head trauma, minor (Age >= 65y);; Neck trauma (Age >= 65y);    TECHNIQUE:  Low dose axial images were obtained through the head and cervical spine.  Coronal and sagittal reformations were also performed. Contrast was not administered.    COMPARISON:  CT head performed 07/19/2022.    FINDINGS:  Head:    Blood: Thin right parafalcine subdural hemorrhage measuring up to 2 mm without significant mass effect.  Right anterior subdural component measures up to 4 mm and right lateral convexity subdural component measures up to 3-4 mm, also without significant mass effect.    Parenchyma: No definite loss of gray-white differentiation to suggest acute or subacute transcortical infarct.    Ventricles/Extra-axial spaces: Medial right occipital lobe encephalomalacia/gliosis, presumed related to remote infarct.  Remote lacunar type infarcts involve the deep gray nuclei.  Generalized pattern age-related parenchymal volume loss elsewhere.  Nonspecific areas of white matter hypoattenuation may reflect sequela of chronic small vessel ischemic disease.    Vessels: Moderate to heavy atherosclerotic calcification.    Orbits: Status post bilateral lens replacements.    Scalp: Unremarkable.    Skull: There are no depressed skull fractures or destructive bone lesions.    Sinuses and mastoids: Essentially clear.    Other  findings: Soft tissue attenuation within the right external auditory canal may relate to cerumen    Cervical spine:    Fractures: No acute fractures    Alignment: There is no significant vertebral subluxation  Atlanto-axial and atlanto-occipital joints: Atlanto-axial and atlanto-occipital intervals are not widened.  Facet joints: There is no traumatic facet joint widening.  Degenerate facet arthropathy.  Ankylosis across the C2-3 through C4-5 facet joints on the left.  Vertebral bodies: Query osseous demineralization.  Degenerative endplate change.  Discs: Degenerative disc disease.  Spinal canal and foraminal narrowing: Although CT does not optimally evaluate the soft tissue contents of the spinal canal and foramina, no critical stenosis is suggested.    At C6-7, moderate to severe bilateral foraminal narrowing.      Paraspinal soft tissues: Unremarkable.    Upper Lungs:No definite acute abnormality.                                        CT Cervical Spine Without Contrast (Final result)  Result time 10/16/24 13:46:59      Final result by Shade Do MD (10/16/24 13:46:59)                   Impression:      1. Thin right convexity and parafalcine subdural hematoma measuring up to 4 mm without significant mass effect.  2. No acute cervical spine fracture.  3. Additional details as above.  This report was flagged in Epic as abnormal.      Electronically signed by: Shade Do  Date:    10/16/2024  Time:    13:46               Narrative:    EXAMINATION:  CT HEAD WITHOUT CONTRAST; CT CERVICAL SPINE WITHOUT CONTRAST    CLINICAL HISTORY:  Head trauma, minor (Age >= 65y);; Neck trauma (Age >= 65y);    TECHNIQUE:  Low dose axial images were obtained through the head and cervical spine.  Coronal and sagittal reformations were also performed. Contrast was not administered.    COMPARISON:  CT head performed 07/19/2022.    FINDINGS:  Head:    Blood: Thin right parafalcine subdural hemorrhage measuring up to 2 mm  without significant mass effect.  Right anterior subdural component measures up to 4 mm and right lateral convexity subdural component measures up to 3-4 mm, also without significant mass effect.    Parenchyma: No definite loss of gray-white differentiation to suggest acute or subacute transcortical infarct.    Ventricles/Extra-axial spaces: Medial right occipital lobe encephalomalacia/gliosis, presumed related to remote infarct.  Remote lacunar type infarcts involve the deep gray nuclei.  Generalized pattern age-related parenchymal volume loss elsewhere.  Nonspecific areas of white matter hypoattenuation may reflect sequela of chronic small vessel ischemic disease.    Vessels: Moderate to heavy atherosclerotic calcification.    Orbits: Status post bilateral lens replacements.    Scalp: Unremarkable.    Skull: There are no depressed skull fractures or destructive bone lesions.    Sinuses and mastoids: Essentially clear.    Other findings: Soft tissue attenuation within the right external auditory canal may relate to cerumen    Cervical spine:    Fractures: No acute fractures    Alignment: There is no significant vertebral subluxation  Atlanto-axial and atlanto-occipital joints: Atlanto-axial and atlanto-occipital intervals are not widened.  Facet joints: There is no traumatic facet joint widening.  Degenerate facet arthropathy.  Ankylosis across the C2-3 through C4-5 facet joints on the left.  Vertebral bodies: Query osseous demineralization.  Degenerative endplate change.  Discs: Degenerative disc disease.  Spinal canal and foraminal narrowing: Although CT does not optimally evaluate the soft tissue contents of the spinal canal and foramina, no critical stenosis is suggested.    At C6-7, moderate to severe bilateral foraminal narrowing.      Paraspinal soft tissues: Unremarkable.    Upper Lungs:No definite acute abnormality.                                       X-Ray Chest AP Portable (Final result)  Result time  10/16/24 14:03:08      Final result by Abbie Bar MD (10/16/24 14:03:08)                   Impression:      As above      Electronically signed by: Abbie Bar MD  Date:    10/16/2024  Time:    14:03               Narrative:    EXAMINATION:  XR CHEST AP PORTABLE    CLINICAL HISTORY:  Dizziness and giddiness    TECHNIQUE:  Single frontal view of the chest was performed.    COMPARISON:  June 24, 2024    FINDINGS:  There are sternotomy wires.  There is vascular calcification along the wall of the aorta.  Cardiac size appears stable compared to prior imaging, borderline in size.  Lungs demonstrate some mild prominence of interstitial markings in this patient with known emphysematous changes.  There is bibasilar opacity, more so on the right, may represent atelectasis versus developing infiltrate.  No large volume of pleural fluid present on this single view.  Findings appear fairly similar to recent prior imaging.                                       X-Ray Thoracic Spine AP Lateral (Final result)  Result time 10/16/24 14:00:22      Final result by Abbie Bar MD (10/16/24 14:00:22)                   Impression:      Degenerative change      Electronically signed by: Abbie Bar MD  Date:    10/16/2024  Time:    14:00               Narrative:    EXAMINATION:  XR THORACIC SPINE AP LATERAL    CLINICAL HISTORY:  Unspecified fall, initial encounter    TECHNIQUE:  AP and lateral views of the thoracic spine were performed.    COMPARISON:  Prior CT    FINDINGS:  There are sternotomy wires.    There is osseous demineralization.  The vertebral bodies are normally aligned.  There is no compression fracture.  Moderate to advanced osteoarthritic degenerative change present in the lower thoracic spine.                                       Medications - No data to display  Medical Decision Making  Differential diagnosis includes but is not limited to cervical fractures, rib fractures,Intracranial hemorrhage,  cerebral edema, skull fracture, stroke, and others.  Patient is sitting up in bed talking.  He is oriented to person place and time.  Answering all questions appropriately.  Complaining of increased dizziness.  Obtain CBC that was within normal limits, CMP it did show elevated glucose of 294,PT 39, INR 3.9 PTT 47.  Obtained a x-ray of thoracic spine that was within normal limits, a chest x-ray that was unchanged from previous x-rays, a CT of the cervical spine that was showed no abnormalities.  Obtained a CT of the skull without contrast and it showed a thin right convexity and parafalcine subdural hematoma measuring up to 4 mm without significant mass effect.  Consulted Neurosurgery about CT results. For monitoring and to repeat the CT in 6 hours from original CT.  Per no changes in CT results or physical exam and patient is stable he should be able to be discharged and follow up with Neurosurgery in clinic per their request.  Myesha Berkowitz PA-C with Neurosurgery will come down and examine the patient.    On reassessment patient is sitting comfortably in bed answering questions appropriately still alert and oriented to person time and place.  He is making jokes with me throughout the whole exam.  I informed him as well as family member in the room of CT results and the plan to keep him to monitor as well as repeat CT.  All else in the room Myesha from Neurosurgery came in and continued to explain treatment plan.  He is in agreement and would like something to eat I will order him a food tray.      Amount and/or Complexity of Data Reviewed  Labs: ordered.  Radiology: ordered.      Assumed care of this patient from the off going team.  In short, patient presented with a mechanical ground level fall and was found to have a very small subdural.  He was seen by Neurosurgery during the day who recommended a 6 hour repeat head CT scan and discussing the case with the on-call neurosurgery team after the imaging  results.  Patient is neurologically intact and has no complaints.             ED Course as of 10/16/24 2107   Wed Oct 16, 2024   2031 Repeat CT head with the had significant changes.  We will discuss with Neurosurgery on-call [KI]   2046 Per NSGY attending Dr. Richardson, patient head scan unchanged.  He is recommending follow up in 2 weeks for repeat scan.  He is recommending holding warfarin until that time.  Also recommending patient be started on Keppra 500 b.i.d. for 1 week. [KI]   2107 Patient and caregiver at the bedside updated home plan.  All questions answered.  Patient will follow up with Neurosurgery and his PCP.  Return precautions given.  Patient will be discharged [KI]      ED Course User Index  [KI] Karen Mcarthur MD                           Clinical Impression:  Final diagnoses:  [W19.XXXA] Fall  [R42] Dizziness  [S06.5XAA] Subdural hematoma (Primary)          ED Disposition Condition    Discharge Stable          ED Prescriptions       Medication Sig Dispense Start Date End Date Auth. Provider    acetaminophen (TYLENOL) 500 MG tablet Take 1 tablet (500 mg total) by mouth every 6 (six) hours as needed. 13 tablet 10/16/2024 -- Glenn Russell MD    levETIRAcetam (KEPPRA) 500 MG Tab Take 1 tablet (500 mg total) by mouth 2 (two) times daily. for 7 days 14 tablet 10/16/2024 10/23/2024 Karen Mcarthur MD          Follow-up Information       Follow up With Specialties Details Why Contact Info    Kasie Edmondson MD Internal Medicine, Pediatrics Schedule an appointment as soon as possible for a visit in 2 days  [address] [facility] [place] [address]  [phone]      Myesha Berkowitz PA-C Neurosurgery Schedule an appointment as soon as possible for a visit in 2 days  [address]  [address]  [place] [id number] [phone]               Karen Mcarthur MD  10/16/24 2107

## 2024-10-17 ENCOUNTER — TELEPHONE (OUTPATIENT)
Dept: NEUROSURGERY | Facility: CLINIC | Age: 84
End: 2024-10-17
Payer: MEDICARE

## 2024-10-17 ENCOUNTER — HOSPITAL ENCOUNTER (INPATIENT)
Facility: HOSPITAL | Age: 84
LOS: 22 days | Discharge: NURSING, REHAB OR LONG-TERM CARE | DRG: 003 | End: 2024-11-08
Attending: EMERGENCY MEDICINE | Admitting: PSYCHIATRY & NEUROLOGY
Payer: MEDICARE

## 2024-10-17 DIAGNOSIS — Z51.81 MEDICATION MONITORING ENCOUNTER: ICD-10-CM

## 2024-10-17 DIAGNOSIS — I62.00 NONTRAUMATIC SUBDURAL HEMORRHAGE, UNSPECIFIED: Primary | ICD-10-CM

## 2024-10-17 DIAGNOSIS — G47.33 OBSTRUCTIVE SLEEP APNEA TREATED WITH BIPAP: ICD-10-CM

## 2024-10-17 DIAGNOSIS — R56.9 FOCAL SEIZURES: ICD-10-CM

## 2024-10-17 DIAGNOSIS — I50.42 CHRONIC COMBINED SYSTOLIC AND DIASTOLIC HEART FAILURE: ICD-10-CM

## 2024-10-17 DIAGNOSIS — R73.9 HYPERGLYCEMIA: ICD-10-CM

## 2024-10-17 DIAGNOSIS — R41.82 ALTERED MENTAL STATUS, UNSPECIFIED ALTERED MENTAL STATUS TYPE: ICD-10-CM

## 2024-10-17 DIAGNOSIS — I61.9 ICH (INTRACEREBRAL HEMORRHAGE): ICD-10-CM

## 2024-10-17 DIAGNOSIS — S06.5XAA SUBDURAL HEMATOMA: ICD-10-CM

## 2024-10-17 DIAGNOSIS — J84.9 ILD (INTERSTITIAL LUNG DISEASE): ICD-10-CM

## 2024-10-17 DIAGNOSIS — I62.9 INTRACRANIAL HEMORRHAGE: Primary | ICD-10-CM

## 2024-10-17 DIAGNOSIS — S06.5X0D TRAUMATIC SUBDURAL HEMATOMA WITHOUT LOSS OF CONSCIOUSNESS, SUBSEQUENT ENCOUNTER: ICD-10-CM

## 2024-10-17 LAB
ABO + RH BLD: NORMAL
ALBUMIN SERPL BCP-MCNC: 3.6 G/DL (ref 3.5–5.2)
ALLENS TEST: ABNORMAL
ALLENS TEST: ABNORMAL
ALP SERPL-CCNC: 105 U/L (ref 40–150)
ALT SERPL W/O P-5'-P-CCNC: 14 U/L (ref 10–44)
ANION GAP SERPL CALC-SCNC: 11 MMOL/L (ref 8–16)
ANION GAP SERPL CALC-SCNC: 15 MMOL/L (ref 8–16)
AST SERPL-CCNC: 14 U/L (ref 10–40)
B-OH-BUTYR BLD STRIP-SCNC: 0.2 MMOL/L (ref 0–0.5)
BACTERIA #/AREA URNS HPF: NORMAL /HPF
BASOPHILS # BLD AUTO: 0.02 K/UL (ref 0–0.2)
BASOPHILS NFR BLD: 0.2 % (ref 0–1.9)
BILIRUB SERPL-MCNC: 0.9 MG/DL (ref 0.1–1)
BILIRUB UR QL STRIP: NEGATIVE
BLD GP AB SCN CELLS X3 SERPL QL: NORMAL
BNP SERPL-MCNC: 249 PG/ML (ref 0–99)
BUN SERPL-MCNC: 31 MG/DL (ref 8–23)
BUN SERPL-MCNC: 34 MG/DL (ref 6–30)
CALCIUM SERPL-MCNC: 9.2 MG/DL (ref 8.7–10.5)
CHLORIDE SERPL-SCNC: 94 MMOL/L (ref 95–110)
CHLORIDE SERPL-SCNC: 96 MMOL/L (ref 95–110)
CLARITY UR: CLEAR
CO2 SERPL-SCNC: 25 MMOL/L (ref 23–29)
COLOR UR: COLORLESS
CREAT SERPL-MCNC: 1.8 MG/DL (ref 0.5–1.4)
CREAT SERPL-MCNC: 2.2 MG/DL (ref 0.5–1.4)
CTP QC/QA: YES
CTP QC/QA: YES
DIFFERENTIAL METHOD BLD: ABNORMAL
EOSINOPHIL # BLD AUTO: 0 K/UL (ref 0–0.5)
EOSINOPHIL NFR BLD: 0 % (ref 0–8)
ERYTHROCYTE [DISTWIDTH] IN BLOOD BY AUTOMATED COUNT: 13.8 % (ref 11.5–14.5)
EST. GFR  (NO RACE VARIABLE): 29 ML/MIN/1.73 M^2
GLUCOSE SERPL-MCNC: 478 MG/DL (ref 70–110)
GLUCOSE SERPL-MCNC: 503 MG/DL (ref 70–110)
GLUCOSE UR QL STRIP: ABNORMAL
HCO3 UR-SCNC: 31 MMOL/L (ref 24–28)
HCT VFR BLD AUTO: 37 % (ref 40–54)
HCT VFR BLD CALC: 37 %PCV (ref 36–54)
HGB BLD-MCNC: 11.9 G/DL (ref 14–18)
HGB UR QL STRIP: NEGATIVE
IMM GRANULOCYTES # BLD AUTO: 0.03 K/UL (ref 0–0.04)
IMM GRANULOCYTES NFR BLD AUTO: 0.3 % (ref 0–0.5)
INR PPP: 2.6 (ref 0.8–1.2)
KETONES UR QL STRIP: NEGATIVE
LEUKOCYTE ESTERASE UR QL STRIP: NEGATIVE
LYMPHOCYTES # BLD AUTO: 0.9 K/UL (ref 1–4.8)
LYMPHOCYTES NFR BLD: 9.4 % (ref 18–48)
MCH RBC QN AUTO: 27.5 PG (ref 27–31)
MCHC RBC AUTO-ENTMCNC: 32.2 G/DL (ref 32–36)
MCV RBC AUTO: 86 FL (ref 82–98)
MICROSCOPIC COMMENT: NORMAL
MONOCYTES # BLD AUTO: 0.8 K/UL (ref 0.3–1)
MONOCYTES NFR BLD: 8.1 % (ref 4–15)
NEUTROPHILS # BLD AUTO: 7.6 K/UL (ref 1.8–7.7)
NEUTROPHILS NFR BLD: 82 % (ref 38–73)
NITRITE UR QL STRIP: NEGATIVE
NRBC BLD-RTO: 0 /100 WBC
OHS QRS DURATION: 106 MS
OHS QTC CALCULATION: 406 MS
PCO2 BLDA: 52.3 MMHG (ref 35–45)
PH SMN: 7.38 [PH] (ref 7.35–7.45)
PH UR STRIP: 7 [PH] (ref 5–8)
PLATELET # BLD AUTO: 197 K/UL (ref 150–450)
PMV BLD AUTO: 9.9 FL (ref 9.2–12.9)
PO2 BLDA: 23 MMHG (ref 40–60)
POC BE: 5 MMOL/L
POC IONIZED CALCIUM: 1.17 MMOL/L (ref 1.06–1.42)
POC MOLECULAR INFLUENZA A AGN: NEGATIVE
POC MOLECULAR INFLUENZA B AGN: NEGATIVE
POC SATURATED O2: 37 % (ref 95–100)
POC TCO2 (MEASURED): 28 MMOL/L (ref 23–29)
POC TCO2: 33 MMOL/L (ref 24–29)
POCT GLUCOSE: 447 MG/DL (ref 70–110)
POCT GLUCOSE: >500 MG/DL (ref 70–110)
POTASSIUM BLD-SCNC: 4.8 MMOL/L (ref 3.5–5.1)
POTASSIUM SERPL-SCNC: 4.8 MMOL/L (ref 3.5–5.1)
PROT SERPL-MCNC: 7.5 G/DL (ref 6–8.4)
PROT UR QL STRIP: NEGATIVE
PROTHROMBIN TIME: 27.3 SEC (ref 9–12.5)
RBC # BLD AUTO: 4.32 M/UL (ref 4.6–6.2)
SAMPLE: ABNORMAL
SAMPLE: ABNORMAL
SARS-COV-2 RDRP RESP QL NAA+PROBE: NEGATIVE
SITE: ABNORMAL
SITE: ABNORMAL
SODIUM BLD-SCNC: 132 MMOL/L (ref 136–145)
SODIUM SERPL-SCNC: 132 MMOL/L (ref 136–145)
SP GR UR STRIP: 1.01 (ref 1–1.03)
SPECIMEN OUTDATE: NORMAL
TROPONIN I SERPL DL<=0.01 NG/ML-MCNC: 0.02 NG/ML (ref 0–0.03)
URN SPEC COLLECT METH UR: ABNORMAL
UROBILINOGEN UR STRIP-ACNC: NEGATIVE EU/DL
WBC # BLD AUTO: 9.24 K/UL (ref 3.9–12.7)
YEAST URNS QL MICRO: NORMAL

## 2024-10-17 PROCEDURE — 63600531 PHARM REV CODE 636 NO ALT 250 W HCPCS: Mod: JZ,JG | Performed by: EMERGENCY MEDICINE

## 2024-10-17 PROCEDURE — 85025 COMPLETE CBC W/AUTO DIFF WBC: CPT | Performed by: EMERGENCY MEDICINE

## 2024-10-17 PROCEDURE — 80061 LIPID PANEL: CPT

## 2024-10-17 PROCEDURE — 85730 THROMBOPLASTIN TIME PARTIAL: CPT

## 2024-10-17 PROCEDURE — 82010 KETONE BODYS QUAN: CPT | Performed by: EMERGENCY MEDICINE

## 2024-10-17 PROCEDURE — 81000 URINALYSIS NONAUTO W/SCOPE: CPT | Performed by: EMERGENCY MEDICINE

## 2024-10-17 PROCEDURE — 93010 ELECTROCARDIOGRAM REPORT: CPT | Mod: ,,, | Performed by: INTERNAL MEDICINE

## 2024-10-17 PROCEDURE — 82330 ASSAY OF CALCIUM: CPT

## 2024-10-17 PROCEDURE — 84484 ASSAY OF TROPONIN QUANT: CPT | Performed by: EMERGENCY MEDICINE

## 2024-10-17 PROCEDURE — 83036 HEMOGLOBIN GLYCOSYLATED A1C: CPT

## 2024-10-17 PROCEDURE — 99900035 HC TECH TIME PER 15 MIN (STAT)

## 2024-10-17 PROCEDURE — 86850 RBC ANTIBODY SCREEN: CPT | Performed by: EMERGENCY MEDICINE

## 2024-10-17 PROCEDURE — 96365 THER/PROPH/DIAG IV INF INIT: CPT

## 2024-10-17 PROCEDURE — 83880 ASSAY OF NATRIURETIC PEPTIDE: CPT | Performed by: EMERGENCY MEDICINE

## 2024-10-17 PROCEDURE — 96375 TX/PRO/DX INJ NEW DRUG ADDON: CPT

## 2024-10-17 PROCEDURE — 25000003 PHARM REV CODE 250: Performed by: EMERGENCY MEDICINE

## 2024-10-17 PROCEDURE — 85610 PROTHROMBIN TIME: CPT | Performed by: EMERGENCY MEDICINE

## 2024-10-17 PROCEDURE — 85014 HEMATOCRIT: CPT

## 2024-10-17 PROCEDURE — 82962 GLUCOSE BLOOD TEST: CPT

## 2024-10-17 PROCEDURE — 84132 ASSAY OF SERUM POTASSIUM: CPT

## 2024-10-17 PROCEDURE — 82565 ASSAY OF CREATININE: CPT

## 2024-10-17 PROCEDURE — 99285 EMERGENCY DEPT VISIT HI MDM: CPT | Mod: 25

## 2024-10-17 PROCEDURE — 63600175 PHARM REV CODE 636 W HCPCS: Performed by: EMERGENCY MEDICINE

## 2024-10-17 PROCEDURE — 84443 ASSAY THYROID STIM HORMONE: CPT

## 2024-10-17 PROCEDURE — 86901 BLOOD TYPING SEROLOGIC RH(D): CPT | Mod: 91

## 2024-10-17 PROCEDURE — 84295 ASSAY OF SERUM SODIUM: CPT

## 2024-10-17 PROCEDURE — 30233K1 TRANSFUSION OF NONAUTOLOGOUS FROZEN PLASMA INTO PERIPHERAL VEIN, PERCUTANEOUS APPROACH: ICD-10-PCS | Performed by: EMERGENCY MEDICINE

## 2024-10-17 PROCEDURE — 87635 SARS-COV-2 COVID-19 AMP PRB: CPT | Performed by: EMERGENCY MEDICINE

## 2024-10-17 PROCEDURE — 93005 ELECTROCARDIOGRAM TRACING: CPT

## 2024-10-17 PROCEDURE — 20000000 HC ICU ROOM

## 2024-10-17 PROCEDURE — 87502 INFLUENZA DNA AMP PROBE: CPT

## 2024-10-17 PROCEDURE — 80053 COMPREHEN METABOLIC PANEL: CPT | Performed by: EMERGENCY MEDICINE

## 2024-10-17 PROCEDURE — 82803 BLOOD GASES ANY COMBINATION: CPT

## 2024-10-17 PROCEDURE — 85610 PROTHROMBIN TIME: CPT | Mod: 91

## 2024-10-17 PROCEDURE — 80047 BASIC METABLC PNL IONIZED CA: CPT

## 2024-10-17 PROCEDURE — 30283B1 TRANSFUSION OF NONAUTOLOGOUS 4-FACTOR PROTHROMBIN COMPLEX CONCENTRATE INTO VEIN, PERCUTANEOUS APPROACH: ICD-10-PCS | Performed by: EMERGENCY MEDICINE

## 2024-10-17 RX ORDER — LABETALOL HCL 20 MG/4 ML
10 SYRINGE (ML) INTRAVENOUS EVERY 6 HOURS PRN
Status: DISCONTINUED | OUTPATIENT
Start: 2024-10-18 | End: 2024-10-22

## 2024-10-17 RX ORDER — ONDANSETRON HYDROCHLORIDE 2 MG/ML
4 INJECTION, SOLUTION INTRAVENOUS EVERY 8 HOURS PRN
Status: DISCONTINUED | OUTPATIENT
Start: 2024-10-18 | End: 2024-11-08 | Stop reason: HOSPADM

## 2024-10-17 RX ORDER — ATORVASTATIN CALCIUM 40 MG/1
40 TABLET, FILM COATED ORAL DAILY
Status: DISCONTINUED | OUTPATIENT
Start: 2024-10-18 | End: 2024-10-19

## 2024-10-17 RX ORDER — HYDROCODONE BITARTRATE AND ACETAMINOPHEN 500; 5 MG/1; MG/1
TABLET ORAL
Status: DISCONTINUED | OUTPATIENT
Start: 2024-10-17 | End: 2024-10-18

## 2024-10-17 RX ORDER — HYDRALAZINE HYDROCHLORIDE 20 MG/ML
10 INJECTION INTRAMUSCULAR; INTRAVENOUS EVERY 6 HOURS PRN
Status: DISCONTINUED | OUTPATIENT
Start: 2024-10-18 | End: 2024-10-22

## 2024-10-17 RX ORDER — NICARDIPINE HYDROCHLORIDE 0.2 MG/ML
0-15 INJECTION INTRAVENOUS CONTINUOUS
Status: DISCONTINUED | OUTPATIENT
Start: 2024-10-17 | End: 2024-10-18

## 2024-10-17 RX ORDER — ACETAMINOPHEN 325 MG/1
650 TABLET ORAL EVERY 6 HOURS PRN
Status: DISCONTINUED | OUTPATIENT
Start: 2024-10-18 | End: 2024-10-21

## 2024-10-17 RX ORDER — POLYETHYLENE GLYCOL 3350 17 G/17G
17 POWDER, FOR SOLUTION ORAL DAILY
Status: DISCONTINUED | OUTPATIENT
Start: 2024-10-18 | End: 2024-10-19

## 2024-10-17 RX ORDER — NICARDIPINE HYDROCHLORIDE 0.2 MG/ML
0-15 INJECTION INTRAVENOUS CONTINUOUS
Status: DISCONTINUED | OUTPATIENT
Start: 2024-10-17 | End: 2024-10-17

## 2024-10-17 RX ORDER — SODIUM CHLORIDE 0.9 % (FLUSH) 0.9 %
10 SYRINGE (ML) INJECTION
Status: DISCONTINUED | OUTPATIENT
Start: 2024-10-18 | End: 2024-10-18

## 2024-10-17 RX ORDER — AMOXICILLIN 250 MG
1 CAPSULE ORAL 2 TIMES DAILY
Status: DISCONTINUED | OUTPATIENT
Start: 2024-10-17 | End: 2024-10-19

## 2024-10-17 RX ADMIN — PHYTONADIONE 10 MG: 10 INJECTION, EMULSION INTRAMUSCULAR; INTRAVENOUS; SUBCUTANEOUS at 08:10

## 2024-10-17 RX ADMIN — NICARDIPINE HYDROCHLORIDE 5 MG/HR: 0.2 INJECTION, SOLUTION INTRAVENOUS at 08:10

## 2024-10-17 RX ADMIN — ACETAMINOPHEN 975 MG: 325 SUPPOSITORY RECTAL at 08:10

## 2024-10-17 RX ADMIN — INSULIN HUMAN 6 UNITS: 100 INJECTION, SOLUTION PARENTERAL at 08:10

## 2024-10-17 RX ADMIN — Medication 2144 UNITS: at 09:10

## 2024-10-17 RX ADMIN — SODIUM CHLORIDE 250 ML: 3 INJECTION, SOLUTION INTRAVENOUS at 09:10

## 2024-10-17 NOTE — ED NOTES
"Patient removed his C-collar. He said "I can't deal with this thing being on". Patient is AAOx3 and made aware of the consequences of not wearing it. I made Dr. Russell aware.  "
Bed: 21  Expected date:   Expected time:   Means of arrival:   Comments:  1  
Hourly rounds made, pt voices no complaints at present time. Call light within reach of pt. No distress noted.  
2

## 2024-10-18 ENCOUNTER — ANESTHESIA EVENT (OUTPATIENT)
Dept: SURGERY | Facility: HOSPITAL | Age: 84
End: 2024-10-18
Payer: MEDICARE

## 2024-10-18 ENCOUNTER — ANESTHESIA (OUTPATIENT)
Dept: SURGERY | Facility: HOSPITAL | Age: 84
End: 2024-10-18
Payer: MEDICARE

## 2024-10-18 PROBLEM — S06.340A TRAUMATIC RIGHT-SIDED INTRACEREBRAL HEMORRHAGE WITHOUT LOSS OF CONSCIOUSNESS: Status: ACTIVE | Noted: 2024-10-18

## 2024-10-18 PROBLEM — N17.9 AKI (ACUTE KIDNEY INJURY): Status: ACTIVE | Noted: 2024-10-18

## 2024-10-18 LAB
ABO + RH BLD: NORMAL
ALBUMIN SERPL BCP-MCNC: 3.4 G/DL (ref 3.5–5.2)
ALP SERPL-CCNC: 95 U/L (ref 40–150)
ALT SERPL W/O P-5'-P-CCNC: 12 U/L (ref 10–44)
ANION GAP SERPL CALC-SCNC: 10 MMOL/L (ref 8–16)
ANION GAP SERPL CALC-SCNC: 10 MMOL/L (ref 8–16)
ANION GAP SERPL CALC-SCNC: 7 MMOL/L (ref 8–16)
ANION GAP SERPL CALC-SCNC: 8 MMOL/L (ref 8–16)
ANION GAP SERPL CALC-SCNC: 9 MMOL/L (ref 8–16)
APTT PPP: 27.1 SEC (ref 21–32)
ASCENDING AORTA: 3.52 CM
AST SERPL-CCNC: 14 U/L (ref 10–40)
AV AREA BY CONTINUOUS VTI: 2.7 CM2
AV INDEX (PROSTH): 0.7
AV LVOT MEAN GRADIENT: 1 MMHG
AV LVOT PEAK GRADIENT: 2 MMHG
AV MEAN GRADIENT: 3.1 MMHG
AV PEAK GRADIENT: 4.8 MMHG
AV VALVE AREA BY VELOCITY RATIO: 2.4 CM²
AV VALVE AREA: 2.6 CM2
AV VELOCITY RATIO: 0.64
BACTERIA #/AREA URNS AUTO: ABNORMAL /HPF
BASOPHILS # BLD AUTO: 0.03 K/UL (ref 0–0.2)
BASOPHILS NFR BLD: 0.3 % (ref 0–1.9)
BILIRUB SERPL-MCNC: 1 MG/DL (ref 0.1–1)
BILIRUB UR QL STRIP: NEGATIVE
BLD GP AB SCN CELLS X3 SERPL QL: NORMAL
BSA FOR ECHO PROCEDURE: 2 M2
BUN SERPL-MCNC: 26 MG/DL (ref 8–23)
BUN SERPL-MCNC: 26 MG/DL (ref 8–23)
BUN SERPL-MCNC: 29 MG/DL (ref 8–23)
BUN SERPL-MCNC: 29 MG/DL (ref 8–23)
BUN SERPL-MCNC: 30 MG/DL (ref 8–23)
CALCIUM SERPL-MCNC: 8.4 MG/DL (ref 8.7–10.5)
CALCIUM SERPL-MCNC: 8.6 MG/DL (ref 8.7–10.5)
CALCIUM SERPL-MCNC: 8.6 MG/DL (ref 8.7–10.5)
CALCIUM SERPL-MCNC: 8.9 MG/DL (ref 8.7–10.5)
CALCIUM SERPL-MCNC: 8.9 MG/DL (ref 8.7–10.5)
CHLORIDE SERPL-SCNC: 102 MMOL/L (ref 95–110)
CHLORIDE SERPL-SCNC: 102 MMOL/L (ref 95–110)
CHLORIDE SERPL-SCNC: 103 MMOL/L (ref 95–110)
CHLORIDE SERPL-SCNC: 108 MMOL/L (ref 95–110)
CHLORIDE SERPL-SCNC: 108 MMOL/L (ref 95–110)
CHOLEST SERPL-MCNC: 108 MG/DL (ref 120–199)
CHOLEST/HDLC SERPL: 4.9 {RATIO} (ref 2–5)
CLARITY UR REFRACT.AUTO: CLEAR
CO2 SERPL-SCNC: 21 MMOL/L (ref 23–29)
CO2 SERPL-SCNC: 22 MMOL/L (ref 23–29)
CO2 SERPL-SCNC: 22 MMOL/L (ref 23–29)
CO2 SERPL-SCNC: 23 MMOL/L (ref 23–29)
CO2 SERPL-SCNC: 25 MMOL/L (ref 23–29)
COLOR UR AUTO: YELLOW
CREAT SERPL-MCNC: 1.4 MG/DL (ref 0.5–1.4)
CREAT SERPL-MCNC: 1.6 MG/DL (ref 0.5–1.4)
CREAT SERPL-MCNC: 1.7 MG/DL (ref 0.5–1.4)
CREAT SERPL-MCNC: 2 MG/DL (ref 0.5–1.4)
CREAT SERPL-MCNC: 2 MG/DL (ref 0.5–1.4)
CV ECHO LV RWT: 0.38 CM
DIFFERENTIAL METHOD BLD: ABNORMAL
DOP CALC AO PEAK VEL: 1.1 M/S
DOP CALC AO VTI: 17.8 CM
DOP CALC LVOT AREA: 3.8 CM2
DOP CALC LVOT DIAMETER: 2.2 CM
DOP CALC LVOT PEAK VEL: 0.7 M/S
DOP CALC LVOT STROKE VOLUME: 47.1 CM3
DOP CALCLVOT PEAK VEL VTI: 12.4 CM
E WAVE DECELERATION TIME: 203.91 MS
E WAVE DECELERATION TIME: 208.54 MS
E/E' RATIO: 11.89 M/S
ECHO EF ESTIMATED: 46 %
ECHO LV POSTERIOR WALL: 1 CM (ref 0.6–1.1)
EOSINOPHIL # BLD AUTO: 0 K/UL (ref 0–0.5)
EOSINOPHIL NFR BLD: 0 % (ref 0–8)
ERYTHROCYTE [DISTWIDTH] IN BLOOD BY AUTOMATED COUNT: 14.2 % (ref 11.5–14.5)
EST. GFR  (NO RACE VARIABLE): 32.9 ML/MIN/1.73 M^2
EST. GFR  (NO RACE VARIABLE): 32.9 ML/MIN/1.73 M^2
EST. GFR  (NO RACE VARIABLE): 40 ML/MIN/1.73 M^2
EST. GFR  (NO RACE VARIABLE): 43 ML/MIN/1.73 M^2
EST. GFR  (NO RACE VARIABLE): 50.5 ML/MIN/1.73 M^2
ESTIMATED AVG GLUCOSE: 237 MG/DL (ref 68–131)
FRACTIONAL SHORTENING: 22.6 % (ref 28–44)
GLUCOSE SERPL-MCNC: 148 MG/DL (ref 70–110)
GLUCOSE SERPL-MCNC: 265 MG/DL (ref 70–110)
GLUCOSE SERPL-MCNC: 392 MG/DL (ref 70–110)
GLUCOSE SERPL-MCNC: 443 MG/DL (ref 70–110)
GLUCOSE SERPL-MCNC: 443 MG/DL (ref 70–110)
GLUCOSE UR QL STRIP: ABNORMAL
HBA1C MFR BLD: 9.9 % (ref 4–5.6)
HCT VFR BLD AUTO: 34.5 % (ref 40–54)
HDLC SERPL-MCNC: 22 MG/DL (ref 40–75)
HDLC SERPL: 20.4 % (ref 20–50)
HGB BLD-MCNC: 11.6 G/DL (ref 14–18)
HGB UR QL STRIP: ABNORMAL
IMM GRANULOCYTES # BLD AUTO: 0.05 K/UL (ref 0–0.04)
IMM GRANULOCYTES NFR BLD AUTO: 0.4 % (ref 0–0.5)
INR PPP: 1.1 (ref 0.8–1.2)
INR PPP: 1.2 (ref 0.8–1.2)
INTERVENTRICULAR SEPTUM: 0.9 CM (ref 0.6–1.1)
KETONES UR QL STRIP: NEGATIVE
LA MAJOR: 6.9 CM
LA MINOR: 6.3 CM
LA WIDTH: 4.01 CM
LDLC SERPL CALC-MCNC: 62.8 MG/DL (ref 63–159)
LEFT ATRIUM SIZE: 4.27 CM
LEFT ATRIUM VOLUME INDEX: 48.7 ML/M2
LEFT ATRIUM VOLUME: 95.86 CM3
LEFT INTERNAL DIMENSION IN SYSTOLE: 4.1 CM (ref 2.1–4)
LEFT VENTRICLE DIASTOLIC VOLUME INDEX: 67.18 ML/M2
LEFT VENTRICLE DIASTOLIC VOLUME: 132.35 ML
LEFT VENTRICLE MASS INDEX: 95.1 G/M2
LEFT VENTRICLE SYSTOLIC VOLUME INDEX: 36.5 ML/M2
LEFT VENTRICLE SYSTOLIC VOLUME: 71.92 ML
LEFT VENTRICULAR INTERNAL DIMENSION IN DIASTOLE: 5.3 CM (ref 3.5–6)
LEFT VENTRICULAR MASS: 187.3 G
LEUKOCYTE ESTERASE UR QL STRIP: ABNORMAL
LV LATERAL E/E' RATIO: 8.92 M/S
LV SEPTAL E/E' RATIO: 17.83 M/S
LYMPHOCYTES # BLD AUTO: 1.1 K/UL (ref 1–4.8)
LYMPHOCYTES NFR BLD: 9.6 % (ref 18–48)
MAGNESIUM SERPL-MCNC: 2.2 MG/DL (ref 1.6–2.6)
MAGNESIUM SERPL-MCNC: 2.2 MG/DL (ref 1.6–2.6)
MCH RBC QN AUTO: 28.6 PG (ref 27–31)
MCHC RBC AUTO-ENTMCNC: 33.6 G/DL (ref 32–36)
MCV RBC AUTO: 85 FL (ref 82–98)
MICROSCOPIC COMMENT: ABNORMAL
MONOCYTES # BLD AUTO: 1.2 K/UL (ref 0.3–1)
MONOCYTES NFR BLD: 9.9 % (ref 4–15)
MV PEAK E VEL: 1.07 M/S
NEUTROPHILS # BLD AUTO: 9.4 K/UL (ref 1.8–7.7)
NEUTROPHILS NFR BLD: 79.8 % (ref 38–73)
NITRITE UR QL STRIP: NEGATIVE
NONHDLC SERPL-MCNC: 86 MG/DL
NRBC BLD-RTO: 0 /100 WBC
OHS CV RV/LV RATIO: 0.6 CM
OHS QRS DURATION: 114 MS
OHS QTC CALCULATION: 447 MS
OSMOLALITY SERPL: 312 MOSM/KG (ref 280–300)
PH UR STRIP: 5 [PH] (ref 5–8)
PHOSPHATE SERPL-MCNC: 2.1 MG/DL (ref 2.7–4.5)
PHOSPHATE SERPL-MCNC: 2.1 MG/DL (ref 2.7–4.5)
PHOSPHATE SERPL-MCNC: 2.3 MG/DL (ref 2.7–4.5)
PHOSPHATE SERPL-MCNC: 3 MG/DL (ref 2.7–4.5)
PHOSPHATE SERPL-MCNC: 4.3 MG/DL (ref 2.7–4.5)
PHOSPHATE SERPL-MCNC: 4.3 MG/DL (ref 2.7–4.5)
PISA TR MAX VEL: 3.1 M/S
PLATELET # BLD AUTO: 189 K/UL (ref 150–450)
PMV BLD AUTO: 10.2 FL (ref 9.2–12.9)
POCT GLUCOSE: 174 MG/DL (ref 70–110)
POCT GLUCOSE: 179 MG/DL (ref 70–110)
POCT GLUCOSE: 202 MG/DL (ref 70–110)
POCT GLUCOSE: 227 MG/DL (ref 70–110)
POCT GLUCOSE: 253 MG/DL (ref 70–110)
POCT GLUCOSE: 280 MG/DL (ref 70–110)
POCT GLUCOSE: 285 MG/DL (ref 70–110)
POCT GLUCOSE: 300 MG/DL (ref 70–110)
POCT GLUCOSE: 312 MG/DL (ref 70–110)
POCT GLUCOSE: 367 MG/DL (ref 70–110)
POCT GLUCOSE: 403 MG/DL (ref 70–110)
POCT GLUCOSE: 432 MG/DL (ref 70–110)
POCT GLUCOSE: 449 MG/DL (ref 70–110)
POTASSIUM SERPL-SCNC: 4.1 MMOL/L (ref 3.5–5.1)
POTASSIUM SERPL-SCNC: 4.3 MMOL/L (ref 3.5–5.1)
POTASSIUM SERPL-SCNC: 5.2 MMOL/L (ref 3.5–5.1)
PROT SERPL-MCNC: 7.2 G/DL (ref 6–8.4)
PROT UR QL STRIP: ABNORMAL
PROTHROMBIN TIME: 11.5 SEC (ref 9–12.5)
PROTHROMBIN TIME: 12.9 SEC (ref 9–12.5)
RA MAJOR: 6.69 CM
RA PRESSURE ESTIMATED: 3 MMHG
RA WIDTH: 4.06 CM
RBC # BLD AUTO: 4.05 M/UL (ref 4.6–6.2)
RBC #/AREA URNS AUTO: >100 /HPF (ref 0–4)
RIGHT VENTRICLE DIASTOLIC BASEL DIMENSION: 3.2 CM
RV TB RVSP: 6 MMHG
SINUS: 3.85 CM
SODIUM SERPL-SCNC: 134 MMOL/L (ref 136–145)
SODIUM SERPL-SCNC: 134 MMOL/L (ref 136–145)
SODIUM SERPL-SCNC: 136 MMOL/L (ref 136–145)
SODIUM SERPL-SCNC: 137 MMOL/L (ref 136–145)
SODIUM SERPL-SCNC: 139 MMOL/L (ref 136–145)
SODIUM SERPL-SCNC: 140 MMOL/L (ref 136–145)
SP GR UR STRIP: 1.02 (ref 1–1.03)
SPECIMEN OUTDATE: NORMAL
SQUAMOUS #/AREA URNS AUTO: 0 /HPF
STJ: 2.97 CM
TDI LATERAL: 0.12 M/S
TDI SEPTAL: 0.06 M/S
TDI: 0.09 M/S
TR MAX PG: 38 MMHG
TRIGL SERPL-MCNC: 116 MG/DL (ref 30–150)
TSH SERPL DL<=0.005 MIU/L-ACNC: 0.54 UIU/ML (ref 0.4–4)
TV PEAK GRADIENT: 41 MMHG
TV REST PULMONARY ARTERY PRESSURE: 41 MMHG
URN SPEC COLLECT METH UR: ABNORMAL
WBC # BLD AUTO: 11.72 K/UL (ref 3.9–12.7)
WBC #/AREA URNS AUTO: 1 /HPF (ref 0–5)
YEAST UR QL AUTO: ABNORMAL
Z-SCORE OF LEFT VENTRICULAR DIMENSION IN END DIASTOLE: -0.66
Z-SCORE OF LEFT VENTRICULAR DIMENSION IN END SYSTOLE: 1.3

## 2024-10-18 PROCEDURE — 94640 AIRWAY INHALATION TREATMENT: CPT

## 2024-10-18 PROCEDURE — C1762 CONN TISS, HUMAN(INC FASCIA): HCPCS | Performed by: NEUROLOGICAL SURGERY

## 2024-10-18 PROCEDURE — 69990 MICROSURGERY ADD-ON: CPT | Mod: 59,,, | Performed by: NEUROLOGICAL SURGERY

## 2024-10-18 PROCEDURE — 81001 URINALYSIS AUTO W/SCOPE: CPT

## 2024-10-18 PROCEDURE — 80048 BASIC METABOLIC PNL TOTAL CA: CPT | Mod: 91,XB

## 2024-10-18 PROCEDURE — 25000003 PHARM REV CODE 250

## 2024-10-18 PROCEDURE — 94761 N-INVAS EAR/PLS OXIMETRY MLT: CPT

## 2024-10-18 PROCEDURE — 0BH17EZ INSERTION OF ENDOTRACHEAL AIRWAY INTO TRACHEA, VIA NATURAL OR ARTIFICIAL OPENING: ICD-10-PCS

## 2024-10-18 PROCEDURE — 87040 BLOOD CULTURE FOR BACTERIA: CPT | Mod: 59

## 2024-10-18 PROCEDURE — 27800903 OPTIME MED/SURG SUP & DEVICES OTHER IMPLANTS: Performed by: NEUROLOGICAL SURGERY

## 2024-10-18 PROCEDURE — 63600175 PHARM REV CODE 636 W HCPCS

## 2024-10-18 PROCEDURE — 99291 CRITICAL CARE FIRST HOUR: CPT | Mod: ,,,

## 2024-10-18 PROCEDURE — 63600175 PHARM REV CODE 636 W HCPCS: Performed by: NEUROLOGICAL SURGERY

## 2024-10-18 PROCEDURE — 20000000 HC ICU ROOM

## 2024-10-18 PROCEDURE — 36620 INSERTION CATHETER ARTERY: CPT

## 2024-10-18 PROCEDURE — 61781 SCAN PROC CRANIAL INTRA: CPT | Mod: ,,, | Performed by: NEUROLOGICAL SURGERY

## 2024-10-18 PROCEDURE — 84295 ASSAY OF SERUM SODIUM: CPT

## 2024-10-18 PROCEDURE — 36000710: Performed by: NEUROLOGICAL SURGERY

## 2024-10-18 PROCEDURE — 63600175 PHARM REV CODE 636 W HCPCS: Performed by: INTERNAL MEDICINE

## 2024-10-18 PROCEDURE — 84100 ASSAY OF PHOSPHORUS: CPT | Mod: 91

## 2024-10-18 PROCEDURE — 27201423 OPTIME MED/SURG SUP & DEVICES STERILE SUPPLY: Performed by: NEUROLOGICAL SURGERY

## 2024-10-18 PROCEDURE — 25000003 PHARM REV CODE 250: Performed by: PSYCHIATRY & NEUROLOGY

## 2024-10-18 PROCEDURE — 25500020 PHARM REV CODE 255: Performed by: PSYCHIATRY & NEUROLOGY

## 2024-10-18 PROCEDURE — 00C70ZZ EXTIRPATION OF MATTER FROM CEREBRAL HEMISPHERE, OPEN APPROACH: ICD-10-PCS | Performed by: NEUROLOGICAL SURGERY

## 2024-10-18 PROCEDURE — 83930 ASSAY OF BLOOD OSMOLALITY: CPT

## 2024-10-18 PROCEDURE — 83735 ASSAY OF MAGNESIUM: CPT | Mod: 91

## 2024-10-18 PROCEDURE — 86920 COMPATIBILITY TEST SPIN: CPT | Performed by: NEUROLOGICAL SURGERY

## 2024-10-18 PROCEDURE — C1729 CATH, DRAINAGE: HCPCS | Performed by: NEUROLOGICAL SURGERY

## 2024-10-18 PROCEDURE — C1751 CATH, INF, PER/CENT/MIDLINE: HCPCS

## 2024-10-18 PROCEDURE — 85025 COMPLETE CBC W/AUTO DIFF WBC: CPT

## 2024-10-18 PROCEDURE — 85610 PROTHROMBIN TIME: CPT

## 2024-10-18 PROCEDURE — 02HV33Z INSERTION OF INFUSION DEVICE INTO SUPERIOR VENA CAVA, PERCUTANEOUS APPROACH: ICD-10-PCS | Performed by: INTERNAL MEDICINE

## 2024-10-18 PROCEDURE — 80053 COMPREHEN METABOLIC PANEL: CPT

## 2024-10-18 PROCEDURE — 63600175 PHARM REV CODE 636 W HCPCS: Performed by: NURSE ANESTHETIST, CERTIFIED REGISTERED

## 2024-10-18 PROCEDURE — 37000008 HC ANESTHESIA 1ST 15 MINUTES: Performed by: NEUROLOGICAL SURGERY

## 2024-10-18 PROCEDURE — 27000221 HC OXYGEN, UP TO 24 HOURS

## 2024-10-18 PROCEDURE — D9220A PRA ANESTHESIA: Mod: CRNA,,,

## 2024-10-18 PROCEDURE — 5A1955Z RESPIRATORY VENTILATION, GREATER THAN 96 CONSECUTIVE HOURS: ICD-10-PCS

## 2024-10-18 PROCEDURE — 25000003 PHARM REV CODE 250: Performed by: NEUROLOGICAL SURGERY

## 2024-10-18 PROCEDURE — 36000711: Performed by: NEUROLOGICAL SURGERY

## 2024-10-18 PROCEDURE — 61313 CRNEC/CRNOT STTL ICERE: CPT | Mod: ,,, | Performed by: NEUROLOGICAL SURGERY

## 2024-10-18 PROCEDURE — 25000242 PHARM REV CODE 250 ALT 637 W/ HCPCS

## 2024-10-18 PROCEDURE — 92523 SPEECH SOUND LANG COMPREHEN: CPT

## 2024-10-18 PROCEDURE — 88304 TISSUE EXAM BY PATHOLOGIST: CPT | Performed by: STUDENT IN AN ORGANIZED HEALTH CARE EDUCATION/TRAINING PROGRAM

## 2024-10-18 PROCEDURE — 25000003 PHARM REV CODE 250: Performed by: NURSE ANESTHETIST, CERTIFIED REGISTERED

## 2024-10-18 PROCEDURE — 37000009 HC ANESTHESIA EA ADD 15 MINS: Performed by: NEUROLOGICAL SURGERY

## 2024-10-18 PROCEDURE — 92610 EVALUATE SWALLOWING FUNCTION: CPT

## 2024-10-18 PROCEDURE — D9220A PRA ANESTHESIA: Mod: ANES,,, | Performed by: ANESTHESIOLOGY

## 2024-10-18 PROCEDURE — 88307 TISSUE EXAM BY PATHOLOGIST: CPT | Performed by: STUDENT IN AN ORGANIZED HEALTH CARE EDUCATION/TRAINING PROGRAM

## 2024-10-18 PROCEDURE — C1713 ANCHOR/SCREW BN/BN,TIS/BN: HCPCS | Performed by: NEUROLOGICAL SURGERY

## 2024-10-18 PROCEDURE — 25500020 PHARM REV CODE 255: Performed by: STUDENT IN AN ORGANIZED HEALTH CARE EDUCATION/TRAINING PROGRAM

## 2024-10-18 PROCEDURE — 88307 TISSUE EXAM BY PATHOLOGIST: CPT | Mod: 26,,, | Performed by: STUDENT IN AN ORGANIZED HEALTH CARE EDUCATION/TRAINING PROGRAM

## 2024-10-18 DEVICE — PLATE BONE BUR HOLE COVER 10MM: Type: IMPLANTABLE DEVICE | Site: CRANIAL | Status: FUNCTIONAL

## 2024-10-18 DEVICE — PLATE BONE BUR HLE CVR 7MM TAB: Type: IMPLANTABLE DEVICE | Site: CRANIAL | Status: FUNCTIONAL

## 2024-10-18 DEVICE — PLATE CRANIAL UN3 BOX LG 2X2: Type: IMPLANTABLE DEVICE | Site: CRANIAL | Status: FUNCTIONAL

## 2024-10-18 DEVICE — PLATE BONE 6 HOLE DBL Y SHAPE: Type: IMPLANTABLE DEVICE | Site: CRANIAL | Status: FUNCTIONAL

## 2024-10-18 DEVICE — SCREW UN3 AXS SD 1.5X4MM: Type: IMPLANTABLE DEVICE | Site: CRANIAL | Status: FUNCTIONAL

## 2024-10-18 DEVICE — COLLAGEN DURAL REGENERATION MEMBRANE 2IN X 2IN (5CM X 5CM)
Type: IMPLANTABLE DEVICE | Site: CRANIAL | Status: FUNCTIONAL
Brand: DURAMATRIX-ONLAY PLUS

## 2024-10-18 DEVICE — PLATE BONE RIGID 2HOLE .6X12MM: Type: IMPLANTABLE DEVICE | Site: CRANIAL | Status: FUNCTIONAL

## 2024-10-18 DEVICE — COLLAGEN DURAL REGENERATION MEMBRANE 4IN X 5IN (10.0CM X 12.5CM)
Type: IMPLANTABLE DEVICE | Site: BRAIN | Status: FUNCTIONAL
Brand: DURAMATRIX-ONLAY PLUS

## 2024-10-18 DEVICE — PLATE BONE 2X2 HOLE SM BOX: Type: IMPLANTABLE DEVICE | Site: CRANIAL | Status: FUNCTIONAL

## 2024-10-18 RX ORDER — FENTANYL CITRATE 50 UG/ML
25 INJECTION, SOLUTION INTRAMUSCULAR; INTRAVENOUS EVERY 5 MIN PRN
Status: CANCELLED | OUTPATIENT
Start: 2024-10-18

## 2024-10-18 RX ORDER — FUROSEMIDE 10 MG/ML
INJECTION INTRAMUSCULAR; INTRAVENOUS
Status: DISCONTINUED | OUTPATIENT
Start: 2024-10-18 | End: 2024-10-18

## 2024-10-18 RX ORDER — PHENYLEPHRINE HYDROCHLORIDE 10 MG/ML
INJECTION INTRAVENOUS
Status: DISCONTINUED | OUTPATIENT
Start: 2024-10-18 | End: 2024-10-18

## 2024-10-18 RX ORDER — OXYCODONE HYDROCHLORIDE 5 MG/1
5 TABLET ORAL
Status: CANCELLED | OUTPATIENT
Start: 2024-10-18

## 2024-10-18 RX ORDER — CEFTRIAXONE 1 G/1
INJECTION, POWDER, FOR SOLUTION INTRAMUSCULAR; INTRAVENOUS
Status: DISCONTINUED | OUTPATIENT
Start: 2024-10-18 | End: 2024-10-18 | Stop reason: HOSPADM

## 2024-10-18 RX ORDER — DEXAMETHASONE SODIUM PHOSPHATE 4 MG/ML
INJECTION, SOLUTION INTRA-ARTICULAR; INTRALESIONAL; INTRAMUSCULAR; INTRAVENOUS; SOFT TISSUE
Status: DISCONTINUED | OUTPATIENT
Start: 2024-10-18 | End: 2024-10-18

## 2024-10-18 RX ORDER — SODIUM CHLORIDE 9 MG/ML
INJECTION, SOLUTION INTRAVENOUS CONTINUOUS
Status: DISCONTINUED | OUTPATIENT
Start: 2024-10-18 | End: 2024-10-18

## 2024-10-18 RX ORDER — SODIUM,POTASSIUM PHOSPHATES 280-250MG
2 POWDER IN PACKET (EA) ORAL
Status: DISCONTINUED | OUTPATIENT
Start: 2024-10-18 | End: 2024-10-21

## 2024-10-18 RX ORDER — PROPOFOL 10 MG/ML
VIAL (ML) INTRAVENOUS
Status: DISCONTINUED | OUTPATIENT
Start: 2024-10-18 | End: 2024-10-18

## 2024-10-18 RX ORDER — LIDOCAINE HYDROCHLORIDE 20 MG/ML
INJECTION INTRAVENOUS
Status: DISCONTINUED | OUTPATIENT
Start: 2024-10-18 | End: 2024-10-18

## 2024-10-18 RX ORDER — LIDOCAINE HYDROCHLORIDE 10 MG/ML
INJECTION, SOLUTION INFILTRATION; PERINEURAL
Status: COMPLETED
Start: 2024-10-18 | End: 2024-10-18

## 2024-10-18 RX ORDER — CALCIUM CHLORIDE INJECTION 100 MG/ML
INJECTION, SOLUTION INTRAVENOUS
Status: DISCONTINUED | OUTPATIENT
Start: 2024-10-18 | End: 2024-10-18

## 2024-10-18 RX ORDER — MANNITOL 250 MG/ML
INJECTION, SOLUTION INTRAVENOUS
Status: DISCONTINUED | OUTPATIENT
Start: 2024-10-18 | End: 2024-10-19

## 2024-10-18 RX ORDER — MUPIROCIN 20 MG/G
OINTMENT TOPICAL 2 TIMES DAILY
Status: DISCONTINUED | OUTPATIENT
Start: 2024-10-18 | End: 2024-10-22

## 2024-10-18 RX ORDER — IPRATROPIUM BROMIDE AND ALBUTEROL SULFATE 2.5; .5 MG/3ML; MG/3ML
3 SOLUTION RESPIRATORY (INHALATION) EVERY 6 HOURS PRN
Status: DISCONTINUED | OUTPATIENT
Start: 2024-10-18 | End: 2024-10-18

## 2024-10-18 RX ORDER — VALSARTAN 40 MG/1
40 TABLET ORAL DAILY
Status: DISCONTINUED | OUTPATIENT
Start: 2024-10-18 | End: 2024-10-19

## 2024-10-18 RX ORDER — SODIUM CHLORIDE 9 MG/ML
1000 INJECTION, SOLUTION INTRAVENOUS CONTINUOUS
Status: DISCONTINUED | OUTPATIENT
Start: 2024-10-18 | End: 2024-10-18

## 2024-10-18 RX ORDER — BACITRACIN ZINC 500 UNIT/G
OINTMENT (GRAM) TOPICAL
Status: DISCONTINUED | OUTPATIENT
Start: 2024-10-18 | End: 2024-10-19 | Stop reason: HOSPADM

## 2024-10-18 RX ORDER — BACITRACIN ZINC 500 UNIT/G
OINTMENT (GRAM) TOPICAL
Status: DISCONTINUED | OUTPATIENT
Start: 2024-10-18 | End: 2024-10-18 | Stop reason: HOSPADM

## 2024-10-18 RX ORDER — PROPOFOL 10 MG/ML
VIAL (ML) INTRAVENOUS
Status: DISCONTINUED | OUTPATIENT
Start: 2024-10-18 | End: 2024-10-19

## 2024-10-18 RX ORDER — FENTANYL CITRATE 50 UG/ML
50 INJECTION, SOLUTION INTRAMUSCULAR; INTRAVENOUS ONCE
Status: COMPLETED | OUTPATIENT
Start: 2024-10-18 | End: 2024-10-18

## 2024-10-18 RX ORDER — NICARDIPINE HYDROCHLORIDE 0.2 MG/ML
INJECTION INTRAVENOUS
Status: COMPLETED
Start: 2024-10-18 | End: 2024-10-18

## 2024-10-18 RX ORDER — SODIUM CHLORIDE 0.9 % (FLUSH) 0.9 %
10 SYRINGE (ML) INJECTION
Status: DISCONTINUED | OUTPATIENT
Start: 2024-10-18 | End: 2024-10-20

## 2024-10-18 RX ORDER — ROCURONIUM BROMIDE 10 MG/ML
INJECTION, SOLUTION INTRAVENOUS
Status: DISCONTINUED | OUTPATIENT
Start: 2024-10-18 | End: 2024-10-19

## 2024-10-18 RX ORDER — DEXTROSE MONOHYDRATE AND SODIUM CHLORIDE 5; .45 G/100ML; G/100ML
INJECTION, SOLUTION INTRAVENOUS CONTINUOUS PRN
Status: DISCONTINUED | OUTPATIENT
Start: 2024-10-18 | End: 2024-10-20

## 2024-10-18 RX ORDER — CARVEDILOL 3.12 MG/1
3.12 TABLET ORAL 2 TIMES DAILY WITH MEALS
Status: DISCONTINUED | OUTPATIENT
Start: 2024-10-18 | End: 2024-10-19

## 2024-10-18 RX ORDER — LIDOCAINE HYDROCHLORIDE AND EPINEPHRINE 10; 10 UG/ML; MG/ML
INJECTION, SOLUTION INFILTRATION; PERINEURAL
Status: DISCONTINUED | OUTPATIENT
Start: 2024-10-18 | End: 2024-10-18 | Stop reason: HOSPADM

## 2024-10-18 RX ORDER — LIDOCAINE HYDROCHLORIDE 10 MG/ML
INJECTION, SOLUTION EPIDURAL; INFILTRATION; INTRACAUDAL; PERINEURAL
Status: COMPLETED
Start: 2024-10-18 | End: 2024-10-18

## 2024-10-18 RX ORDER — DEXAMETHASONE SODIUM PHOSPHATE 4 MG/ML
INJECTION, SOLUTION INTRA-ARTICULAR; INTRALESIONAL; INTRAMUSCULAR; INTRAVENOUS; SOFT TISSUE
Status: DISCONTINUED | OUTPATIENT
Start: 2024-10-18 | End: 2024-10-19

## 2024-10-18 RX ORDER — LIDOCAINE HYDROCHLORIDE 10 MG/ML
5 INJECTION, SOLUTION EPIDURAL; INFILTRATION; INTRACAUDAL; PERINEURAL
Status: DISPENSED | OUTPATIENT
Start: 2024-10-18 | End: 2024-10-19

## 2024-10-18 RX ORDER — HALOPERIDOL 5 MG/ML
0.5 INJECTION INTRAMUSCULAR EVERY 10 MIN PRN
Status: CANCELLED | OUTPATIENT
Start: 2024-10-18

## 2024-10-18 RX ORDER — LEVETIRACETAM 500 MG/5ML
500 INJECTION, SOLUTION, CONCENTRATE INTRAVENOUS EVERY 12 HOURS
Status: DISCONTINUED | OUTPATIENT
Start: 2024-10-18 | End: 2024-10-21

## 2024-10-18 RX ORDER — LIDOCAINE HYDROCHLORIDE 20 MG/ML
INJECTION INTRAVENOUS
Status: DISCONTINUED | OUTPATIENT
Start: 2024-10-18 | End: 2024-10-19

## 2024-10-18 RX ORDER — ONDANSETRON HYDROCHLORIDE 2 MG/ML
INJECTION, SOLUTION INTRAVENOUS
Status: DISCONTINUED | OUTPATIENT
Start: 2024-10-18 | End: 2024-10-19

## 2024-10-18 RX ORDER — GLUCAGON 1 MG
1 KIT INJECTION
Status: CANCELLED | OUTPATIENT
Start: 2024-10-18

## 2024-10-18 RX ORDER — METOPROLOL TARTRATE 1 MG/ML
INJECTION, SOLUTION INTRAVENOUS
Status: DISCONTINUED | OUTPATIENT
Start: 2024-10-18 | End: 2024-10-18

## 2024-10-18 RX ORDER — ROCURONIUM BROMIDE 10 MG/ML
INJECTION, SOLUTION INTRAVENOUS
Status: DISCONTINUED | OUTPATIENT
Start: 2024-10-18 | End: 2024-10-18

## 2024-10-18 RX ORDER — MANNITOL 250 MG/ML
INJECTION, SOLUTION INTRAVENOUS
Status: DISCONTINUED | OUTPATIENT
Start: 2024-10-18 | End: 2024-10-18

## 2024-10-18 RX ORDER — FENTANYL CITRATE 50 UG/ML
INJECTION, SOLUTION INTRAMUSCULAR; INTRAVENOUS
Status: DISCONTINUED | OUTPATIENT
Start: 2024-10-18 | End: 2024-10-18

## 2024-10-18 RX ORDER — MANNITOL 20 G/100ML
50 INJECTION, SOLUTION INTRAVENOUS ONCE
Status: COMPLETED | OUTPATIENT
Start: 2024-10-18 | End: 2024-10-18

## 2024-10-18 RX ORDER — FENTANYL CITRATE 50 UG/ML
INJECTION, SOLUTION INTRAMUSCULAR; INTRAVENOUS
Status: DISCONTINUED | OUTPATIENT
Start: 2024-10-18 | End: 2024-10-19

## 2024-10-18 RX ORDER — SODIUM CHLORIDE 0.9 % (FLUSH) 0.9 %
10 SYRINGE (ML) INJECTION
Status: CANCELLED | OUTPATIENT
Start: 2024-10-18

## 2024-10-18 RX ORDER — NICARDIPINE HYDROCHLORIDE 0.2 MG/ML
0-15 INJECTION INTRAVENOUS CONTINUOUS
Status: DISCONTINUED | OUTPATIENT
Start: 2024-10-18 | End: 2024-10-20

## 2024-10-18 RX ORDER — PHENYLEPHRINE HYDROCHLORIDE 10 MG/ML
INJECTION INTRAVENOUS
Status: DISCONTINUED | OUTPATIENT
Start: 2024-10-18 | End: 2024-10-19

## 2024-10-18 RX ORDER — LIDOCAINE HYDROCHLORIDE AND EPINEPHRINE 10; 10 UG/ML; MG/ML
INJECTION, SOLUTION INFILTRATION; PERINEURAL
Status: DISCONTINUED | OUTPATIENT
Start: 2024-10-18 | End: 2024-10-19 | Stop reason: HOSPADM

## 2024-10-18 RX ORDER — ONDANSETRON HYDROCHLORIDE 2 MG/ML
INJECTION, SOLUTION INTRAVENOUS
Status: DISCONTINUED | OUTPATIENT
Start: 2024-10-18 | End: 2024-10-18

## 2024-10-18 RX ORDER — SODIUM CHLORIDE 9 MG/ML
1000 INJECTION, SOLUTION INTRAVENOUS CONTINUOUS
Status: ACTIVE | OUTPATIENT
Start: 2024-10-18 | End: 2024-10-18

## 2024-10-18 RX ADMIN — FENTANYL CITRATE 50 MCG: 50 INJECTION, SOLUTION INTRAMUSCULAR; INTRAVENOUS at 07:10

## 2024-10-18 RX ADMIN — PHENYLEPHRINE HYDROCHLORIDE 0.2 MCG/KG/MIN: 10 INJECTION INTRAVENOUS at 11:10

## 2024-10-18 RX ADMIN — PROPOFOL 100 MG: 10 INJECTION, EMULSION INTRAVENOUS at 10:10

## 2024-10-18 RX ADMIN — FENTANYL CITRATE 50 MCG: 50 INJECTION, SOLUTION INTRAMUSCULAR; INTRAVENOUS at 02:10

## 2024-10-18 RX ADMIN — SODIUM CHLORIDE: 0.9 INJECTION, SOLUTION INTRAVENOUS at 07:10

## 2024-10-18 RX ADMIN — CEFTRIAXONE 2 G: 1 INJECTION, POWDER, FOR SOLUTION INTRAMUSCULAR; INTRAVENOUS at 11:10

## 2024-10-18 RX ADMIN — INSULIN HUMAN 3 UNITS/HR: 1 INJECTION, SOLUTION INTRAVENOUS at 01:10

## 2024-10-18 RX ADMIN — LEVETIRACETAM 1000 MG: 100 INJECTION INTRAVENOUS at 08:10

## 2024-10-18 RX ADMIN — LABETALOL HYDROCHLORIDE 10 MG: 5 INJECTION, SOLUTION INTRAVENOUS at 09:10

## 2024-10-18 RX ADMIN — DEXAMETHASONE SODIUM PHOSPHATE 4 MG: 4 INJECTION, SOLUTION INTRAMUSCULAR; INTRAVENOUS at 09:10

## 2024-10-18 RX ADMIN — PHENYLEPHRINE HYDROCHLORIDE 100 MCG: 10 INJECTION INTRAVENOUS at 07:10

## 2024-10-18 RX ADMIN — LIDOCAINE HYDROCHLORIDE 50 MG: 10 INJECTION, SOLUTION EPIDURAL; INFILTRATION; INTRACAUDAL at 05:10

## 2024-10-18 RX ADMIN — FENTANYL CITRATE 50 MCG: 50 INJECTION INTRAMUSCULAR; INTRAVENOUS at 05:10

## 2024-10-18 RX ADMIN — PHENYLEPHRINE HYDROCHLORIDE 100 MCG: 10 INJECTION INTRAVENOUS at 10:10

## 2024-10-18 RX ADMIN — METOPROLOL TARTRATE 1 MG: 5 INJECTION, SOLUTION INTRAVENOUS at 02:10

## 2024-10-18 RX ADMIN — SODIUM CHLORIDE 250 ML: 3 INJECTION, SOLUTION INTRAVENOUS at 01:10

## 2024-10-18 RX ADMIN — NICARDIPINE HYDROCHLORIDE 10 MG/HR: 0.2 INJECTION, SOLUTION INTRAVENOUS at 05:10

## 2024-10-18 RX ADMIN — TIOTROPIUM BROMIDE INHALATION SPRAY 2 PUFF: 3.12 SPRAY, METERED RESPIRATORY (INHALATION) at 08:10

## 2024-10-18 RX ADMIN — IOHEXOL 100 ML: 350 INJECTION, SOLUTION INTRAVENOUS at 04:10

## 2024-10-18 RX ADMIN — LIDOCAINE HYDROCHLORIDE 100 MG: 20 INJECTION INTRAVENOUS at 10:10

## 2024-10-18 RX ADMIN — ROCURONIUM BROMIDE 20 MG: 10 INJECTION, SOLUTION INTRAVENOUS at 08:10

## 2024-10-18 RX ADMIN — ROCURONIUM BROMIDE 30 MG: 10 INJECTION, SOLUTION INTRAVENOUS at 12:10

## 2024-10-18 RX ADMIN — LEVETIRACETAM 500 MG: 100 INJECTION INTRAVENOUS at 08:10

## 2024-10-18 RX ADMIN — PROPOFOL 100 MG: 10 INJECTION, EMULSION INTRAVENOUS at 07:10

## 2024-10-18 RX ADMIN — FENTANYL CITRATE 50 MCG: 50 INJECTION, SOLUTION INTRAMUSCULAR; INTRAVENOUS at 10:10

## 2024-10-18 RX ADMIN — MANNITOL 50 G: 12.5 INJECTION, SOLUTION INTRAVENOUS at 09:10

## 2024-10-18 RX ADMIN — CARVEDILOL 3.12 MG: 3.12 TABLET, FILM COATED ORAL at 07:10

## 2024-10-18 RX ADMIN — CEFTRIAXONE 2 G: 1 INJECTION, POWDER, FOR SOLUTION INTRAMUSCULAR; INTRAVENOUS at 08:10

## 2024-10-18 RX ADMIN — NICARDIPINE HYDROCHLORIDE 40 MG: 0.2 INJECTION, SOLUTION INTRAVENOUS at 05:10

## 2024-10-18 RX ADMIN — ROCURONIUM BROMIDE 20 MG: 10 INJECTION, SOLUTION INTRAVENOUS at 11:10

## 2024-10-18 RX ADMIN — SODIUM CHLORIDE 1000 ML: 9 INJECTION, SOLUTION INTRAVENOUS at 01:10

## 2024-10-18 RX ADMIN — ATORVASTATIN CALCIUM 40 MG: 40 TABLET, FILM COATED ORAL at 08:10

## 2024-10-18 RX ADMIN — ROCURONIUM BROMIDE 50 MG: 10 INJECTION, SOLUTION INTRAVENOUS at 10:10

## 2024-10-18 RX ADMIN — MUPIROCIN: 20 OINTMENT TOPICAL at 08:10

## 2024-10-18 RX ADMIN — ROCURONIUM BROMIDE 10 MG: 10 INJECTION, SOLUTION INTRAVENOUS at 10:10

## 2024-10-18 RX ADMIN — CALCIUM CHLORIDE INJECTION 0.5 G: 100 INJECTION, SOLUTION INTRAVENOUS at 12:10

## 2024-10-18 RX ADMIN — PHENYLEPHRINE HYDROCHLORIDE 200 MCG: 10 INJECTION INTRAVENOUS at 11:10

## 2024-10-18 RX ADMIN — SODIUM CHLORIDE 250 ML: 3 INJECTION, SOLUTION INTRAVENOUS at 04:10

## 2024-10-18 RX ADMIN — PHENYLEPHRINE HYDROCHLORIDE 200 MCG: 10 INJECTION INTRAVENOUS at 10:10

## 2024-10-18 RX ADMIN — SODIUM CHLORIDE 0.2 MCG/KG/MIN: 9 INJECTION, SOLUTION INTRAVENOUS at 10:10

## 2024-10-18 RX ADMIN — PROPOFOL 100 MCG/KG/MIN: 10 INJECTION, EMULSION INTRAVENOUS at 10:10

## 2024-10-18 RX ADMIN — SODIUM CHLORIDE: 9 INJECTION, SOLUTION INTRAVENOUS at 10:10

## 2024-10-18 RX ADMIN — FENTANYL CITRATE 25 MCG: 50 INJECTION, SOLUTION INTRAMUSCULAR; INTRAVENOUS at 10:10

## 2024-10-18 RX ADMIN — PHENYLEPHRINE HYDROCHLORIDE 0.2 MCG/KG/MIN: 10 INJECTION INTRAVENOUS at 08:10

## 2024-10-18 RX ADMIN — LEVETIRACETAM 500 MG: 100 INJECTION INTRAVENOUS at 09:10

## 2024-10-18 RX ADMIN — SENNOSIDES AND DOCUSATE SODIUM 1 TABLET: 50; 8.6 TABLET ORAL at 08:10

## 2024-10-18 RX ADMIN — FUROSEMIDE 20 MG: 10 INJECTION, SOLUTION INTRAMUSCULAR; INTRAVENOUS at 11:10

## 2024-10-18 RX ADMIN — INSULIN HUMAN 2 UNITS/HR: 1 INJECTION, SOLUTION INTRAVENOUS at 03:10

## 2024-10-18 RX ADMIN — LABETALOL HYDROCHLORIDE 10 MG: 5 INJECTION, SOLUTION INTRAVENOUS at 03:10

## 2024-10-18 RX ADMIN — ROCURONIUM BROMIDE 25 MG: 10 INJECTION, SOLUTION INTRAVENOUS at 01:10

## 2024-10-18 RX ADMIN — DEXAMETHASONE SODIUM PHOSPHATE 10 MG: 4 INJECTION, SOLUTION INTRAMUSCULAR; INTRAVENOUS at 10:10

## 2024-10-18 RX ADMIN — ONDANSETRON 4 MG: 2 INJECTION INTRAMUSCULAR; INTRAVENOUS at 09:10

## 2024-10-18 RX ADMIN — POLYETHYLENE GLYCOL 3350 17 G: 17 POWDER, FOR SOLUTION ORAL at 08:10

## 2024-10-18 RX ADMIN — METOPROLOL TARTRATE 1.5 MG: 5 INJECTION, SOLUTION INTRAVENOUS at 02:10

## 2024-10-18 RX ADMIN — FENTANYL CITRATE 25 MCG: 50 INJECTION, SOLUTION INTRAMUSCULAR; INTRAVENOUS at 11:10

## 2024-10-18 RX ADMIN — PERFLUTREN 1.5 ML: 6.52 INJECTION, SUSPENSION INTRAVENOUS at 10:10

## 2024-10-18 RX ADMIN — PHENYLEPHRINE HYDROCHLORIDE 100 MCG: 10 INJECTION INTRAVENOUS at 11:10

## 2024-10-18 RX ADMIN — SODIUM CHLORIDE 250 ML: 3 INJECTION, SOLUTION INTRAVENOUS at 11:10

## 2024-10-18 RX ADMIN — HYDRALAZINE HYDROCHLORIDE 10 MG: 20 INJECTION INTRAMUSCULAR; INTRAVENOUS at 04:10

## 2024-10-18 RX ADMIN — SODIUM CHLORIDE: 0.9 INJECTION, SOLUTION INTRAVENOUS at 08:10

## 2024-10-18 RX ADMIN — VALSARTAN 40 MG: 40 TABLET, FILM COATED ORAL at 08:10

## 2024-10-18 RX ADMIN — SUGAMMADEX 200 MG: 100 INJECTION, SOLUTION INTRAVENOUS at 02:10

## 2024-10-18 RX ADMIN — METOPROLOL TARTRATE 2.5 MG: 5 INJECTION, SOLUTION INTRAVENOUS at 03:10

## 2024-10-18 RX ADMIN — INSULIN HUMAN 7.6 UNITS/HR: 1 INJECTION, SOLUTION INTRAVENOUS at 09:10

## 2024-10-18 RX ADMIN — ONDANSETRON 4 MG: 2 INJECTION INTRAMUSCULAR; INTRAVENOUS at 02:10

## 2024-10-18 RX ADMIN — LIDOCAINE HYDROCHLORIDE 100 MG: 20 INJECTION INTRAVENOUS at 07:10

## 2024-10-18 RX ADMIN — MANNITOL 50 G: 20 INJECTION, SOLUTION INTRAVENOUS at 11:10

## 2024-10-18 RX ADMIN — ROCURONIUM BROMIDE 50 MG: 10 INJECTION, SOLUTION INTRAVENOUS at 11:10

## 2024-10-18 RX ADMIN — ROCURONIUM BROMIDE 50 MG: 10 INJECTION, SOLUTION INTRAVENOUS at 07:10

## 2024-10-18 RX ADMIN — SODIUM CHLORIDE 1000 ML: 9 INJECTION, SOLUTION INTRAVENOUS at 03:10

## 2024-10-18 RX ADMIN — NICARDIPINE HYDROCHLORIDE 7.5 MG/HR: 0.2 INJECTION, SOLUTION INTRAVENOUS at 12:10

## 2024-10-18 RX ADMIN — FENTANYL CITRATE 50 MCG: 50 INJECTION, SOLUTION INTRAMUSCULAR; INTRAVENOUS at 08:10

## 2024-10-18 NOTE — PT/OT/SLP EVAL
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 male with oral and pharyngeal phases of swallow wfl.  Cognitive assessment in progress.  History:     Past Medical History:   Diagnosis Date    Allergy     Arthritis     CAD, multiple vessel     DR Melissa    Cataract     Depression     History of colon polyps     Hyperlipidemia     Hypertension     Macular degeneration     Dr Razo for injection, Hejulia for eye    S/P CABG x 2     Type 2 diabetes mellitus with circulatory disorder     Dr. Aquino       Past Surgical History:   Procedure Laterality Date    broken elbow      left    COLONOSCOPY N/A 10/4/2017    Procedure: COLONOSCOPY;  Surgeon: Gabriel Leung MD;  Location: [facility];  Service: Endoscopy;  Laterality: N/A;    EYE SURGERY      cataract    heart bypass      2004    HERNIA REPAIR      right    ROTATOR CUFF REPAIR      right  2004       Social History: Patient lives with son.      Prior diet: regular with thin.    Occupation/hobbies/homemaking: retired.    Subjective     "I fell and hit my head"  pt. Response to why he is in hospital  Patient goals: home     Pain/Comfort:  Pain Rating 1: 0/10  Pain Rating Post-Intervention 1: 0/10    Respiratory Status: Room air    Objective:     Cognitive Status:    Pt. was oriented x3 with fair-good recall of recent and remote " temporal and general information.  Immediate/delayed verbal recall was impaired with pt. Repeating 4 digits and 3 words without difficulty.  Eyes were closed for most of session despite pt. Responding to tasks.    Responses to hypothetical verbal problem solving tasks were accurate and complete.  Pt. Compared and contrasted objects and generated 2/3 solutions to problems.  Thought organization and categorization skills were impaired with pt. Naming 4 animals given one minute when 15-20 are wnl.  Further assessment of cognition warranted when level of alertness improves.      Receptive Language:   Comprehension:   Pt. Followed 2 step commands and responded to complex yes/no questions with 80% accuracy    Pragmatics:    Eyes closed  Delays in responding    Expressive Language:  Verbal:    Pt. Recalled common nouns in response to questions with 100% accuracy and expressed thoughts in simple conversation.      Motor Speech:  wfl    Voice:   wfl    Visual-Spatial:  tba    Reading:   tba     Written Expression:   tba    Oral Musculature Evaluation  Oral Musculature: WFL  Dentition: upper and lower dentures  Secretion Management: adequate  Mucosal Quality: dry  Mandibular Strength and Mobility: WFL  Oral Labial Strength and Mobility: WFL  Lingual Strength and Mobility: WFL  Velar Elevation: WFL  Buccal Strength and Mobility: WFL  Volitional Cough: strong  Volitional Swallow: delayed  Voice Prior to PO Intake: wfl    Bedside Swallow Eval:   Consistencies Assessed:  Thin liquids 3 teaspoons then took 5 edge of cup sips of water  Puree 2 teaspoons  Solids 1/2 c racker      Oral Phase:   WFL    Pharyngeal Phase:   no overt clinical signs/symptoms of aspiration  no overt clinical signs/symptoms of pharyngeal dysphagia    Compensatory Strategies  None    Treatment: education provided re role of slp, diet recs and slp plan of care    Goals:   Multidisciplinary Problems       SLP Goals          Problem: SLP    Goal Priority  Disciplines Outcome   SLP Goal     SLP Progressing   Description: Goals due 10/25  1. Tolerate regular diet with thin liquids with no s/s of aspiration  2.  Assess functional reading, writing, visual spatial skills  3.  Respond to verbal problem solving tasks with 80% accuracy                       Plan:     Patient to be seen:  4 x/week   Plan of Care expires:  11/15/24  Plan of Care reviewed with:  patient   SLP Follow-Up:  Yes       Discharge recommendations:  Therapy Intensity Recommendations at Discharge:  (tbd)   Barriers to Discharge:  None    Time Tracking:     SLP Treatment Date:   10/18/24  Speech Start Time:  0740  Speech Stop Time:  0757     Speech Total Time (min):  17 min    Billable Minutes: Eval 9  and Eval Swallow and Oral Function 8    10/18/2024

## 2024-10-18 NOTE — ASSESSMENT & PLAN NOTE
Initial presentation c/f HHS w glucose > 600. Beta hydroxybutyrate and urine ketones negative. Serum CO2 22. Given SQ insulin at OSH.  [place] [facility] [place] A1c 9.9% on admit.     Insulin gtt  Gentle IVF given HFrEF  Serum osmolality pending

## 2024-10-18 NOTE — NURSING
Pt arrived back to [facility] 9080 following R crani in the OR.    Time of anesthesia transfer of care (and end time for our post-op monitoring):    Was the CRNA HANDOFF tool used?: yes    Surgical incision care orders in? N/A    Drain care orders in? n/a    HOB orders:  HOB Elevated      Pt was escorted by RN, MD, and CRNA on cardiac monitoring, O2, ambu bag, and IV pole.        Patient placed back on bedside monitor with alarms audible, bed in low position with bed alarm on, call light within reach. WCTM.

## 2024-10-18 NOTE — PLAN OF CARE
Psychiatric Care Plan  POC reviewed with Percy Ramirez and family at 1400. Patient verbalized understanding. Questions and concerns addressed. No acute events today. Pt not progressing toward goals. Will continue to monitor. See below and flowsheets for full assessment and VS info.     -NS @ 100  -Insulin gtt started @ 2.5 now  -bath completed  -bowel movement   -Longoria in placed  -Cardene off  -Blood cultures done  -CT completed      Is this a stroke patient? no    Neuro:  Geneva Coma Scale  Best Eye Response: 3-->(E3) to speech  Best Motor Response: 6-->(M6) obeys commands  Best Verbal Response: 5-->(V5) oriented  Geneva Coma Scale Score: 14  Assessment Qualifiers: patient not sedated/intubated  Pupil PERRLA: yes     24 hr Temp:  [97.6 °F (36.4 °C)-101 °F (38.3 °C)]     CV:   Rhythm: normal sinus rhythm  BP goals:   SBP < 140  MAP > 65    Resp:           Plan:  NC @ 3 L    GI/:     Diet/Nutrition Received: NPO  Last Bowel Movement: 10/16/24  Voiding Characteristics: ureteral catheter    Intake/Output Summary (Last 24 hours) at 10/18/2024 0612  Last data filed at 10/18/2024 0605  Gross per 24 hour   Intake 532.17 ml   Output 875 ml   Net -342.83 ml          Labs/Accuchecks:  Recent Labs   Lab 10/18/24  0054   WBC 11.72   RBC 4.05*   HGB 11.6*   HCT 34.5*         Recent Labs   Lab 10/18/24  0054 10/18/24  0319   *  134* 137   K 5.2*  5.2* 4.3   CO2 22*  22* 25     102 103   BUN 29*  29* 30*   CREATININE 2.0*  2.0* 1.7*   ALKPHOS 95  --    ALT 12  --    AST 14  --    BILITOT 1.0  --       Recent Labs   Lab 10/17/24  2335   INR 1.2   APTT 27.1      Recent Labs   Lab 10/17/24  1928   TROPONINI 0.022       Electrolytes: No replacement orders  Accuchecks: Q1H    Gtts:   0.9% NaCl  1,000 mL Intravenous Continuous 100 mL/hr at 10/18/24 0605 Rate Verify at 10/18/24 0605    D5 and 0.45% NaCl   Intravenous Continuous PRN        insulin regular 1 units/mL infusion orderable (DKA)  0-52 Units/hr  Intravenous Continuous 4.5 mL/hr at 10/18/24 0605 4.5 Units/hr at 10/18/24 0605    nicardipine  0-15 mg/hr Intravenous Continuous   Stopped at 10/18/24 0301       LDA/Wounds:    Mykel Risk Assessment  Sensory Perception: 4-->no impairment  Moisture: 4-->rarely moist  Activity: 1-->bedfast  Mobility: 2-->very limited  Nutrition: 3-->adequate  Friction and Shear: 3-->no apparent problem  Mykel Score: 17    Is your mykel score 12 or less? no      Nurses Note -- 4 Eyes    Is there altered skin present?  yes  Second RN/Staff Member:  RADHA Sánchez      Restraints:   Restraint Order  Length of Order: Order good for next 24 hours or when removed.  Date that the current order will : 10/19/24  Time that the current order will : 0555  Order Upon Application: Yes    [facility]

## 2024-10-18 NOTE — PT/OT/SLP PROGRESS
Physical Therapy      Patient Name:  Percy Ramirez   MRN:  [id number]    Patient not seen today secondary to pt taken to OR for Class A R supraorbital crani for clot evac. Will follow-up pending new orders post op.

## 2024-10-18 NOTE — NURSING
Patient arrived to [facility] from [facility] by [facility]    Report received from: ED RN, RADHA Em    Type of stroke/diagnosis: traumatic subdural hemorrhage      Current symptoms: arouses to pain, A&Ox4, moves all extremities purposefully     Skin Assessment done: Y  Wounds noted: non-blanchable redness to sacrum   *If wounds noted, was Wound Care consulted? N/A  *If wounds noted, LDA placed? Y  Skin Assessment Verified by: Georgette Townsend Completed? N    Patient Belongings on Admit: cell phone, magnifier, keys, watch, phone , blue shirt, green and blue flannel, red and black plaid pajama pants, brown slippers     NCC notified: ARLIN Nunez

## 2024-10-18 NOTE — CONSULTS
Inpatient consult to Physical Medicine Rehab  Consult performed by: Nydia Earl NP  Consult ordered by: Angel Nunez PA-C      Consult received.     Nydia Earl NP  Physical Medicine & Rehabilitation   10/18/2024

## 2024-10-18 NOTE — CONSULTS
[facility] - Neuro Critical Care  Neurosurgery  Consult Note    Inpatient consult to Neurosurgery  Consult performed by: Gaurav Velásquez MD  Consult ordered by: Angel Nunez PA-C  Reason for consult: R frontal ICH        Subjective:     Chief Complaint/Reason for Admission: Right frontal ICH    History of Present Illness: Percy Ramirez is a 83 y.o. 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 Pt initially presented to  ED 2 days ago after mechanical fall and was found to have small SDH. Repeat scan was stable and patient was discharged home with outpatient follow up and instructions to hold his blood thinners. He re-presented to  ED yesterday after suffering another unwitnessed fall and family noticing him becoming more lethargic with c/o neck and head pain. Blood glucose >600 at OSH. CTH was indicative of 5cm right frontal IPC with 7mm midline shift. K-centra was given at OSH and pt was transferred to [facility] for higher level of care. Upon arrival pt INR was 1.2. Pt has waxing and waning exam, at times oriented and following commands and at other times lethargic, disoriented, and not responding. Further history limited due to patient's mental status change.    Medications Prior to Admission   Medication Sig Dispense Refill Last Dose/Taking    acetaminophen (TYLENOL) 325 MG tablet Take 2 tablets (650 mg total) by mouth every 6 (six) hours as needed. 13 tablet 0     acetaminophen (TYLENOL) 500 MG tablet Take 1 tablet (500 mg total) by mouth every 6 (six) hours as needed. 13 tablet 0     albuterol (PROVENTIL) 2.5 mg /3 mL (0.083 %) nebulizer solution Take 3 mLs (2.5 mg total) by nebulization every 6 to 8 hours as needed for Wheezing. 100 mL 3     albuterol (PROVENTIL/VENTOLIN HFA) 90 mcg/actuation inhaler INHALE 2 PUFFS BY MOUTH EVERY 6 HOURS AS NEEDED FOR WHEEZING OR  SHORTNESS  OF  BREATH 8 g 3      "atorvastatin (LIPITOR) 40 MG tablet Take 40 mg by mouth once daily.       atorvastatin (LIPITOR) 40 MG tablet Take 1 tablet by mouth every morning.       BD INSULIN PEN NEEDLE UF SHORT 31 gauge x 5/16" Ndle        BD INSULIN SYRINGE ULTRA-FINE 1/2 mL 31 gauge x 15/64" Syrg        blood sugar diagnostic Strp To check BG 3 times daily, to use with insurance preferred meter 200 strip 11     carvediloL (COREG) 3.125 MG tablet Take 1 tablet (3.125 mg total) by mouth 2 (two) times daily with meals. 180 tablet 0     ceramides 1,3,6-II (CERAVE DAILY MOISTURIZING) Lotn Apply twice daily to skin 355 mL 1     cinnamon bark 500 mg capsule Take 1,000 mg by mouth once daily.         clopidogreL (PLAVIX) 75 mg tablet Take 75 mg by mouth.       diclofenac sodium (VOLTAREN) 1 % Gel Apply 2 g topically 3 (three) times daily. 50 g 0     DULoxetine (CYMBALTA) 60 MG capsule Take 1 capsule (60 mg total) by mouth once daily. 90 capsule 3     fluticasone propionate (FLONASE) 50 mcg/actuation nasal spray 1 spray (50 mcg total) by Each Nostril route 2 (two) times daily. 18.2 mL 3     furosemide (LASIX) 40 MG tablet Take 40 mg by mouth once daily.       gabapentin (NEURONTIN) 300 MG capsule Take 4 capsules (1,200 mg total) by mouth 2 (two) times daily. 540 capsule 1     glimepiride (AMARYL) 4 MG tablet Take 4 mg by mouth daily with breakfast.        HYDROcodone-acetaminophen (NORCO) 7.5-325 mg per tablet Take 1 tablet by mouth every 8 (eight) hours as needed for Pain. 90 tablet 0     [START ON 11/3/2024] HYDROcodone-acetaminophen (NORCO) 7.5-325 mg per tablet Take 1 tablet by mouth every 8 (eight) hours as needed for Pain. 90 tablet 0     insulin NPH-insulin regular, 70/30, (NOVOLIN 70/30) 100 unit/mL (70-30) injection Inject into the skin. Inject 10 units at breakfast and inject 10 units at night       insulin syringe-needle U-100 0.3 mL 31 gauge x 15/64" Syrg USE TO INJECT INSULIN THREE TIMES DAILY       insulin syringe-needle U-100 1/2 mL " "31 x 5/16" Syrg        ipratropium (ATROVENT) 42 mcg (0.06 %) nasal spray 2 sprays by Nasal route every 6 (six) hours as needed for Rhinitis (use as needed for runny nose and also use 15 minutes prior to meal). Clean nose with saline prior to use 15 mL 3     isosorbide mononitrate (IMDUR) 30 MG 24 hr tablet Take 30 mg by mouth once daily.       lancets Misc To check BG 3 times daily, to use with insurance preferred meter 200 each 11     levETIRAcetam (KEPPRA) 500 MG Tab Take 1 tablet (500 mg total) by mouth 2 (two) times daily. for 7 days 14 tablet 0     LIDOcaine (LIDODERM) 5 % Place 1 patch onto the skin once daily. Remove & Discard patch within 12 hours or as directed by MD 5 patch 1     meclizine (ANTIVERT) 12.5 mg tablet Take 1 tablet (12.5 mg total) by mouth 3 (three) times daily as needed for Dizziness. 90 tablet 0     methocarbamoL (ROBAXIN) 500 MG Tab TAKE 1 TABLET BY MOUTH 4 TIMES DAILY FOR 10 DAYS 60 tablet 0     mupirocin (BACTROBAN) 2 % ointment Apply topically 3 (three) times daily. 1 g 1     nitroGLYCERIN (NITROSTAT) 0.4 MG SL tablet DISSOLVE 1 TABLET UNDER THE TONGUE EVERY 5 MINUTES AS  NEEDED FOR CHEST PAIN. MAX  OF 3 TABLETS IN 15 MINUTES. CALL 911 IF PAIN PERSISTS.       omeprazole (PRILOSEC) 20 MG capsule Take 1 capsule (20 mg total) by mouth once daily. 30 capsule 2     pravastatin (PRAVACHOL) 20 MG tablet Take 1 tablet (20 mg total) by mouth once daily. 90 tablet 1     spironolactone (ALDACTONE) 25 MG tablet Take 0.5 tablets (12.5 mg total) by mouth once daily.       tiotropium-olodateroL (STIOLTO RESPIMAT) 2.5-2.5 mcg/actuation Mist Inhale 2 puffs into the lungs once daily. Controller 1 g 11     triamcinolone acetonide 0.1% (KENALOG) 0.1 % cream SMARTSI Application Topical 2-3 Times Daily       valsartan (DIOVAN) 40 MG tablet Take 1 tablet (40 mg total) by mouth once daily. 90 tablet 0     VIT C/VIT E AC/LUT/COPPER/ZINC (PRESERVISION LUTEIN ORAL) Take by mouth.       warfarin (COUMADIN) 5 " MG tablet Take 2 tabs by mouth on Tuesday,Thursday, Saturday and Sundays then 1.5 on all other days.          Review of patient's allergies indicates:   Allergen Reactions    Aricept odt [donepezil] Diarrhea and Nausea And Vomiting    Codeine Nausea Only     weak    Ranexa [ranolazine]        Past Medical History:   Diagnosis Date    Allergy     Arthritis     CAD, multiple vessel     DR Melissa    Cataract     Depression     History of colon polyps     Hyperlipidemia     Hypertension     Macular degeneration     Dr Razo for injection, Jennifer for eye    S/P CABG x 2     Type 2 diabetes mellitus with circulatory disorder     Dr. Aquino     Past Surgical History:   Procedure Laterality Date    broken elbow      left    COLONOSCOPY N/A 10/4/2017    Procedure: COLONOSCOPY;  Surgeon: Gabriel Leung MD;  Location: [facility];  Service: Endoscopy;  Laterality: N/A;    EYE SURGERY      cataract    heart bypass      2004    HERNIA REPAIR      right    ROTATOR CUFF REPAIR      right  2004     Family History       Problem Relation (Age of Onset)    Arthritis Mother    Cancer Brother, Maternal Grandfather    Diabetes Mother, Maternal Aunt, Maternal Uncle, Paternal Aunt    Heart attack Father    Heart disease Maternal Uncle, Paternal Aunt, Paternal Uncle, Maternal Grandmother    Stroke Mother    Throat cancer Brother          Tobacco Use    Smoking status: Former     Current packs/day: 0.00     Average packs/day: 3.0 packs/day for 45.0 years (135.0 ttl pk-yrs)     Types: Cigarettes     Start date: 1959     Quit date: 2004     Years since quittin.5    Smokeless tobacco: Former   Substance and Sexual Activity    Alcohol use: Yes     Comment: was heavy drinker 35 years ago, rare use now    Drug use: No    Sexual activity: Yes     Partners: Female     Review of Systems   Unable to perform ROS: Acuity of condition     Objective:     Weight: 83.9 kg (184 lb 14.4 oz)  Body mass index is 28.11 kg/m².  Vital Signs  (Most Recent):  Temp: 97.6 °F (36.4 °C) (10/17/24 2311)  Pulse: 91 (10/17/24 2311)  Resp: 14 (10/17/24 2311)  BP: (!) 162/80 (10/17/24 2311)  SpO2: (!) 92 % (10/17/24 2311) Vital Signs (24h Range):  Temp:  [97.6 °F (36.4 °C)-101 °F (38.3 °C)] 97.6 °F (36.4 °C)  Pulse:  [80-97] 91  Resp:  [14-20] 14  SpO2:  [92 %-100 %] 92 %  BP: (115-197)/() 162/80                              Urethral Catheter 10/17/24 2012 Straight-tip 16 Fr. (Active)          Physical Exam  Vitals reviewed.   Constitutional:       General: He is not in acute distress.  HENT:      Head: Normocephalic.   Eyes:      Extraocular Movements: Extraocular movements intact.      Pupils: Pupils are equal, round, and reactive to light.      Comments: Right eye difficult to open   Cardiovascular:      Rate and Rhythm: Normal rate.   Pulmonary:      Effort: Pulmonary effort is normal.   Abdominal:      General: There is no distension.   Musculoskeletal:         General: Normal range of motion.      Cervical back: Normal range of motion.   Skin:     General: Skin is warm and dry.   Neurological:      Mental Status: He is disoriented.      Cranial Nerves: No cranial nerve deficit.      Motor: No weakness.          GENERAL: resting comfortably  HEENT: PERRL, mucous membranes moist  NECK: supple, trachea midline  CV: normal capillary refill  PULM: aerating well, symmetric expansion, no distress  ABD: soft, NT, ND  EXT: no c/c/e     NEURO:     GCS 13 E3V4M6  AAO x 2 (missed year)  CN II-XII grossly intact  Fc x 4 antigravity  SILT     Non-cooperative with drift and dysmetria exam        Significant Labs:  Recent Labs   Lab 10/16/24  1335 10/17/24  1928   * 503*   * 132*   K 4.9 4.8   CL 98 96   CO2 27 25   BUN 32* 31*   CREATININE 1.8* 2.2*   CALCIUM 9.4 9.2     Recent Labs   Lab 10/16/24  1335 10/17/24  1928 10/17/24  1934   WBC 8.78 9.24  --    HGB 13.0* 11.9*  --    HCT 39.0* 37.0* 37    197  --      Recent Labs   Lab 10/16/24  2943  10/17/24  2017 10/17/24  2335   INR 3.9* 2.6* 1.2   APTT 47.0*  --  27.1     Microbiology Results (last 7 days)       Procedure Component Value Units Date/Time    Blood culture [[id number]]     Order Status: No result Specimen: Blood     Blood culture [[id number]]     Order Status: No result Specimen: Blood     Culture, Respiratory with Gram Stain [[id number]]     Order Status: No result Specimen: Respiratory           All pertinent labs from the last 24 hours have been reviewed.    Significant Diagnostics:  CT: CT Head Without Contrast    Result Date: 10/17/2024  Large 5 cm intraparenchymal hemorrhage centered in the right frontal lobe.  Additional small amount of acute subdural hemorrhage seen along the right aspect of the interhemispheric falx.  Apparent small subarachnoid component of hemorrhage is also seen with small amount of layering blood also seen within the posterior horns of the lateral ventricles. COMMUNICATION This critical result was discovered/received on 10/17/2024 at 19:49. The critical information above was relayed directly by Ricardo Rivera MD by telephone to Dr. Krystian Fitzpatrick on 10/17/2024 at 19:49. Electronically signed by: Ricardo Rivera MD Date:    10/17/2024 Time:    19:56   MRI: No results found in the last 24 hours.  I have reviewed all pertinent imaging results/findings within the past 24 hours.  I have reviewed and interpreted all pertinent imaging results/findings within the past 24 hours.  Assessment/Plan:     Traumatic right-sided intracerebral hemorrhage without loss of consciousness  83 y.o. 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 Given K-centra at OSH:    Imaging:  [facility] 10/17: 5cm R frontal ICH with 7mm MLS and some intraventricular blood in posterior lateral vent  [facility]17[facility] no acute processes  [facility]    Plan:  Patient admitted to ICU on telemetry  q1h neurochecks in ICU, q2h  neurochecks in stepdown, q4h neurochecks on floor  All labs and diagnostics reviewed  Follow-up CTH and 6h scan for stability  MRI/CTA for suspicious lesions  Reverse anti-plt/coag medications. Given K-centra at OSH. INR 1.2 on arrival  SBP <140 (Cardene gtt; Hydralazine & Labetalol PRN; Transition to home meds when appropriate)  Na >135  Keppra 500 BID  HOB >30  Follow-up pre-op labs (CBC/CMP/PT-INR/PTT/T&S)  NPO at this time for possible operative intervention  Continue to monitor clinically, notify NSGY immediately with any changes in neuro status    Plan discussed with Dr. Condon    Dispo: admitted to ICU            Thank you for your consult. We will follow-up with patient. Please contact us if you have any additional questions.    Gaurav Velásquez MD  Neurosurgery  [facility] - Neuro Critical Care

## 2024-10-18 NOTE — PLAN OF CARE
[facility] - Neuro Critical Care  Initial Discharge Assessment       Primary Care Provider: Kasie Edmondson MD    Admission Diagnosis: Intracranial hemorrhage [I62.9]  Hyperglycemia [R73.9]  Altered mental status, unspecified altered mental status type [R41.82]    Admission Date: 10/17/2024  Expected Discharge Date: 10/22/2024    Transition of Care Barriers: None    Payor: [employer] [facility] / Plan: [employer] CHOICES / Product Type: Medicare Advantage /     Extended Emergency Contact Information  Primary Emergency Contact: Kalyani Felder  Address: [address] [place] [address] [address] [place] [address] [place]  Mobile Phone: [phone]  Relation: Daughter    Discharge Plan A: Home with family  Discharge Plan B: Home      [facility] [id number]  BRANDIE FIGUEROA - [address]  [address]  [place] [address]  Phone: [phone] Fax: [phone]    [facility] ([facility]) - [address]  [address] [facility] [address]  [facility] [address]  Phone: [phone] Fax: [phone]    [facility] - [facility] [address]  [address]  [facility] [address]  Phone: [phone] Fax: [phone]    [facility] [address]  [address]   [facility] [address]  Phone: [phone] Fax: [phone]      Initial Assessment (most recent)       Adult Discharge Assessment - 10/18/24 1510          Discharge Assessment    Assessment Type Discharge Planning Assessment     Confirmed/corrected address, phone number and insurance Yes     Confirmed Demographics Correct on Facesheet     Source of Information family     Communicated TATI with patient/caregiver Yes     Reason For Admission Traumatic right-sided intracerebral hemorrhage without loss of consciousness     People in Home child(bridget), adult     Do you expect to return to your current living situation? Yes     Do you have help at  home or someone to help you manage your care at home? No     Prior to hospitilization cognitive status: Unable to Assess     Current cognitive status: Unable to Assess     Walking or Climbing Stairs Difficulty yes   walker, rollator, and electric scooter    Walking or Climbing Stairs ambulation difficulty, requires equipment     Mobility Management walker, rollator, and electric scooter     Dressing/Bathing Difficulty no     Home Layout Able to live on 1st floor     Equipment Currently Used at Home rollator;walker, standard     Readmission within 30 days? No     Patient currently being followed by outpatient case management? No     Do you currently have service(s) that help you manage your care at home? No     Do you take prescription medications? Yes     Do you have prescription coverage? Yes     Coverage Payor: [employer] [facility] - [employer] CHO[employer] -     Do you have any problems affording any of your prescribed medications? No     Is the patient taking medications as prescribed? yes     Who is going to help you get home at discharge? Kalyani Felder  [phone]     How do you get to doctors appointments? family or friend will provide     Are you on dialysis? No     Do you take coumadin? Yes     Who monitors your labs? Grand son     Discharge Plan A Home with family     Discharge Plan B Home     DME Needed Upon Discharge  none     Discharge Plan discussed with: Adult children     Transition of Care Barriers None        Physical Activity    On average, how many days per week do you engage in moderate to strenuous exercise (like a brisk walk)? 0 days     On average, how many minutes do you engage in exercise at this level? 0 min        Financial Resource Strain    How hard is it for you to pay for the very basics like food, housing, medical care, and heating? Patient unable to answer        Housing Stability    In the last 12 months, was there a time when you were not able to pay the mortgage or  rent on time? No     At any time in the past 12 months, were you homeless or living in a shelter (including now)? No        Transportation Needs    Has the lack of transportation kept you from medical appointments, meetings, work or from getting things needed for daily living? No        Food Insecurity    Within the past 12 months, you worried that your food would run out before you got the money to buy more. Patient unable to answer     Within the past 12 months, the food you bought just didn't last and you didn't have money to get more. Patient unable to answer        Social Isolation    How often do you feel lonely or isolated from those around you?  Patient unable to answer        Alcohol Use    Q1: How often do you have a drink containing alcohol? Never     Q2: How many drinks containing alcohol do you have on a typical day when you are drinking? Patient does not drink        Utilities    In the past 12 months has the electric, gas, oil, or water company threatened to shut off services in your home? No        Health Literacy    How often do you need to have someone help you when you read instructions, pamphlets, or other written material from your doctor or pharmacy? Patient unable to respond        OTHER    Name(s) of People in Home Kenton Ashley                   The SW met with the patient  daughter  Kalyani Felder  at bedside. The SW placed name and contact information on the blackboard in the patient's room. Use preferred pharmacy / bedside delivery for any necessary medications at the time of discharge. The patient is  not independent with all ADLs.  The pt uses a walker, Rolator or electric scooter  at times. The patient is not on Dialysis  but takes Coumadin. The Patient does not use DME or home oxygen, The patient's daughter  will provide assistance to the patient upon discharge. The patient's daughter  will provide transportation upon discharge. SW will continue to follow for course of  hospitalization.  A discharge planning book was left at bedside.        Discharge Plan A and Plan B have been determined by review of patient's clinical status, future medical and therapeutic needs, and coverage/benefits for post-acute care in coordination with multidisciplinary team members.    Haleigh Perdomo MSW, FIONAW  [facility]  Case Management Dept.

## 2024-10-18 NOTE — HPI
Percy Ramirez is a 83 y.o. 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 Pt initially presented to  ED 2 days ago after mechanical fall and was found to have small SDH. Repeat scan was stable and patient was discharged home with outpatient follow up and instructions to hold his blood thinners. He re-presented to  ED yesterday after suffering another unwitnessed fall and family noticing him becoming more lethargic with c/o neck and head pain. Blood glucose >600 at OSH. CTH was indicative of 5cm right frontal IPC with 7mm midline shift. K-centra was given at OSH and pt was transferred to [facility] for higher level of care. Upon arrival pt INR was 1.2. Pt has waxing and waning exam, at times oriented and following commands and at other times lethargic, disoriented, and not responding. Further history limited due to patient's mental status change.

## 2024-10-18 NOTE — ANESTHESIA PROCEDURE NOTES
Intubation    Date/Time: 10/18/2024 10:27 AM    Performed by: Yanet Dominguez CRNA  Authorized by: Vero Fu MD    Intubation:     Induction:  Intravenous    Intubated:  Postinduction    Mask Ventilation:  Moderately difficult with oral airway    Attempts:  1    Attempted By:  CRNA    Method of Intubation:  Video laryngoscopy    Blade:  Gomes 3    Laryngeal View Grade: Grade I - full view of cords      Difficult Airway Encountered?: No      Complications:  None    Airway Device:  Oral endotracheal tube    Airway Device Size:  7.5    Style/Cuff Inflation:  Cuffed (inflated to minimal occlusive pressure)    Tube secured:  23    Secured at:  The lips    Placement Verified By:  Capnometry    Complicating Factors:  Obesity    Findings Post-Intubation:  BS equal bilateral and atraumatic/condition of teeth unchanged

## 2024-10-18 NOTE — SUBJECTIVE & OBJECTIVE
"  Past Medical History:   Diagnosis Date    Allergy     Arthritis     CAD, multiple vessel     DR Melissa    Cataract     Depression     History of colon polyps     Hyperlipidemia     Hypertension     Macular degeneration     Dr Razo for injection, Jennifer for eye    S/P CABG x 2     Type 2 diabetes mellitus with circulatory disorder     Dr. Aquino     Past Surgical History:   Procedure Laterality Date    broken elbow      left    COLONOSCOPY N/A 10/4/2017    Procedure: COLONOSCOPY;  Surgeon: Gabriel Leung MD;  Location: [facility];  Service: Endoscopy;  Laterality: N/A;    EYE SURGERY      cataract    heart bypass      2004    HERNIA REPAIR      right    ROTATOR CUFF REPAIR      right  2004      No current facility-administered medications on file prior to encounter.     Current Outpatient Medications on File Prior to Encounter   Medication Sig Dispense Refill    acetaminophen (TYLENOL) 325 MG tablet Take 2 tablets (650 mg total) by mouth every 6 (six) hours as needed. 13 tablet 0    acetaminophen (TYLENOL) 500 MG tablet Take 1 tablet (500 mg total) by mouth every 6 (six) hours as needed. 13 tablet 0    albuterol (PROVENTIL) 2.5 mg /3 mL (0.083 %) nebulizer solution Take 3 mLs (2.5 mg total) by nebulization every 6 to 8 hours as needed for Wheezing. 100 mL 3    albuterol (PROVENTIL/VENTOLIN HFA) 90 mcg/actuation inhaler INHALE 2 PUFFS BY MOUTH EVERY 6 HOURS AS NEEDED FOR WHEEZING OR  SHORTNESS  OF  BREATH 8 g 3    atorvastatin (LIPITOR) 40 MG tablet Take 40 mg by mouth once daily.      atorvastatin (LIPITOR) 40 MG tablet Take 1 tablet by mouth every morning.      BD INSULIN PEN NEEDLE UF SHORT 31 gauge x 5/16" Ndle       BD INSULIN SYRINGE ULTRA-FINE 1/2 mL 31 gauge x 15/64" Syrg       blood sugar diagnostic Strp To check BG 3 times daily, to use with insurance preferred meter 200 strip 11    carvediloL (COREG) 3.125 MG tablet Take 1 tablet (3.125 mg total) by mouth 2 (two) times daily with meals. 180 tablet " "0    ceramides 1,3,6-II (CERAVE DAILY MOISTURIZING) Lotn Apply twice daily to skin 355 mL 1    cinnamon bark 500 mg capsule Take 1,000 mg by mouth once daily.        clopidogreL (PLAVIX) 75 mg tablet Take 75 mg by mouth.      diclofenac sodium (VOLTAREN) 1 % Gel Apply 2 g topically 3 (three) times daily. 50 g 0    DULoxetine (CYMBALTA) 60 MG capsule Take 1 capsule (60 mg total) by mouth once daily. 90 capsule 3    fluticasone propionate (FLONASE) 50 mcg/actuation nasal spray 1 spray (50 mcg total) by Each Nostril route 2 (two) times daily. 18.2 mL 3    furosemide (LASIX) 40 MG tablet Take 40 mg by mouth once daily.      gabapentin (NEURONTIN) 300 MG capsule Take 4 capsules (1,200 mg total) by mouth 2 (two) times daily. 540 capsule 1    glimepiride (AMARYL) 4 MG tablet Take 4 mg by mouth daily with breakfast.       HYDROcodone-acetaminophen (NORCO) 7.5-325 mg per tablet Take 1 tablet by mouth every 8 (eight) hours as needed for Pain. 90 tablet 0    [START ON 11/3/2024] HYDROcodone-acetaminophen (NORCO) 7.5-325 mg per tablet Take 1 tablet by mouth every 8 (eight) hours as needed for Pain. 90 tablet 0    insulin NPH-insulin regular, 70/30, (NOVOLIN 70/30) 100 unit/mL (70-30) injection Inject into the skin. Inject 10 units at breakfast and inject 10 units at night      insulin syringe-needle U-100 0.3 mL 31 gauge x 15/64" Syrg USE TO INJECT INSULIN THREE TIMES DAILY      insulin syringe-needle U-100 1/2 mL 31 x 5/16" Syrg       ipratropium (ATROVENT) 42 mcg (0.06 %) nasal spray 2 sprays by Nasal route every 6 (six) hours as needed for Rhinitis (use as needed for runny nose and also use 15 minutes prior to meal). Clean nose with saline prior to use 15 mL 3    isosorbide mononitrate (IMDUR) 30 MG 24 hr tablet Take 30 mg by mouth once daily.      lancets Misc To check BG 3 times daily, to use with insurance preferred meter 200 each 11    levETIRAcetam (KEPPRA) 500 MG Tab Take 1 tablet (500 mg total) by mouth 2 (two) times " daily. for 7 days 14 tablet 0    LIDOcaine (LIDODERM) 5 % Place 1 patch onto the skin once daily. Remove & Discard patch within 12 hours or as directed by MD 5 patch 1    meclizine (ANTIVERT) 12.5 mg tablet Take 1 tablet (12.5 mg total) by mouth 3 (three) times daily as needed for Dizziness. 90 tablet 0    methocarbamoL (ROBAXIN) 500 MG Tab TAKE 1 TABLET BY MOUTH 4 TIMES DAILY FOR 10 DAYS 60 tablet 0    mupirocin (BACTROBAN) 2 % ointment Apply topically 3 (three) times daily. 1 g 1    nitroGLYCERIN (NITROSTAT) 0.4 MG SL tablet DISSOLVE 1 TABLET UNDER THE TONGUE EVERY 5 MINUTES AS  NEEDED FOR CHEST PAIN. MAX  OF 3 TABLETS IN 15 MINUTES. CALL 911 IF PAIN PERSISTS.      omeprazole (PRILOSEC) 20 MG capsule Take 1 capsule (20 mg total) by mouth once daily. 30 capsule 2    pravastatin (PRAVACHOL) 20 MG tablet Take 1 tablet (20 mg total) by mouth once daily. 90 tablet 1    spironolactone (ALDACTONE) 25 MG tablet Take 0.5 tablets (12.5 mg total) by mouth once daily.      tiotropium-olodateroL (STIOLTO RESPIMAT) 2.5-2.5 mcg/actuation Mist Inhale 2 puffs into the lungs once daily. Controller 1 g 11    triamcinolone acetonide 0.1% (KENALOG) 0.1 % cream SMARTSI Application Topical 2-3 Times Daily      valsartan (DIOVAN) 40 MG tablet Take 1 tablet (40 mg total) by mouth once daily. 90 tablet 0    VIT C/VIT E AC/LUT/COPPER/ZINC (PRESERVISION LUTEIN ORAL) Take by mouth.      warfarin (COUMADIN) 5 MG tablet Take 2 tabs by mouth on Tuesday,Thursday, Saturday and Sundays then 1.5 on all other days.        Allergies: Aricept odt [donepezil], Codeine, and Ranexa [ranolazine]  Family History   Problem Relation Name Age of Onset    Arthritis Mother      Diabetes Mother      Stroke Mother      Heart attack Father      Cancer Brother      Throat cancer Brother      Diabetes Maternal Aunt      Diabetes Maternal Uncle      Heart disease Maternal Uncle      Diabetes Paternal Aunt      Heart disease Paternal Aunt      Heart disease Paternal  Uncle      Heart disease Maternal Grandmother      Cancer Maternal Grandfather       Social History     Tobacco Use    Smoking status: Former     Current packs/day: 0.00     Average packs/day: 3.0 packs/day for 45.0 years (135.0 ttl pk-yrs)     Types: Cigarettes     Start date: 1959     Quit date: 2004     Years since quittin.5    Smokeless tobacco: Former   Substance Use Topics    Alcohol use: Yes     Comment: was heavy drinker 35 years ago, rare use now    Drug use: No     Review of Systems   Unable to perform ROS: Acuity of condition     Objective:     Vitals:    Temp: 97.6 °F (36.4 °C)  Pulse: 91  BP: (!) 162/80  MAP (mmHg): 114  Resp: 14  SpO2: (!) 92 %    Temp  Min: 97.6 °F (36.4 °C)  Max: 101 °F (38.3 °C)  Pulse  Min: 80  Max: 97  BP  Min: 115/57  Max: 197/88  MAP (mmHg)  Min: 80  Max: 124  Resp  Min: 14  Max: 20  SpO2  Min: 92 %  Max: 100 %    10/17 0701 - 10/18 0700  In: -   Out: 550 [Urine:550]            Physical Exam  Vitals and nursing note reviewed.   Constitutional:       Appearance: He is normal weight.   HENT:      Head: Normocephalic and atraumatic.      Right Ear: External ear normal.      Left Ear: External ear normal.      Nose: Nose normal.      Mouth/Throat:      Mouth: Mucous membranes are dry.      Pharynx: Oropharynx is clear.   Eyes:      Extraocular Movements: Extraocular movements intact.      Conjunctiva/sclera: Conjunctivae normal.   Cardiovascular:      Rate and Rhythm: Tachycardia present. Rhythm irregular.   Pulmonary:      Effort: Pulmonary effort is normal. No respiratory distress.   Abdominal:      General: There is no distension.      Palpations: Abdomen is soft.      Tenderness: There is no abdominal tenderness. There is no guarding.   Musculoskeletal:      Cervical back: Normal range of motion.      Right lower leg: No edema.      Left lower leg: No edema.   Skin:     General: Skin is warm and dry.      Capillary Refill: Capillary refill takes less than 2  seconds.      Findings: Bruising present.   Neurological:      Comments: E1 V1 M6  Not opening eyes to noxious. Not answering any questions or making any noises; however, RN reported pt was Ox3 on arrival about 30 min prior to this exam. Following commands after several attempts/ bright light in eyes and excessive encouragement (gave thumbs up in RUE, wiggled toes, open mouth, stuck out tongue)  CN II-XII grossly intact, specifically:  PERRL (L sluggish compared to R). Eyes cross midline. Blinks to threat bilaterally.   No facial asymmetry.   Tongue midline.   Shoulder shrug symmetric.  Motor:  BUE following commands with encouragement and moving spontaneously and antigravity   BLE moving spontaneously, lifting knees off of bed, wiggles toes to command  Sensation intact to noxious stimuli x4       Unable to test orientation, language, memory, judgment, insight, fund of knowledge, coordination, gait due to level of consciousness.       Today I personally reviewed pertinent medications, lines/drains/airways, imaging, cardiology results, laboratory results, microbiology results, notably:    PT 12.9 from 27.3, INR 1.2 from 2.6  aPTT 27 from 47    Cr 2.2 from 1.8   BUN 31  Na 132 and , corrected Na 138  Beta hydroxybutyrate 0.2, UA negative for ketones   HbA1c 9.9%    CTH 10/17/24 @ 1948  Large 5 cm intraparenchymal hemorrhage centered in the right frontal lobe. Additional small amount of acute subdural hemorrhage seen along the right aspect of the interhemispheric falx. Apparent small subarachnoid component of hemorrhage is also seen with small amount of layering blood also seen within the posterior horns of the lateral ventricles.

## 2024-10-18 NOTE — NURSING
Manometry for Central Line Procedure     Guidewire Removal:  Yes    Manometry Performed: yes    Manometry performed by: MD Rocael

## 2024-10-18 NOTE — HPI
Percy Ramirez is a 83M PMHx of HTN, HLD, DM2, CAD s/p CABG x2, Afib on Coumadin, ILD on 4L of O2, HFrEF (45%), presenting as a transfer for a large traumatic R frontal IPH. Pt initially presented to [facility] ED yesterday ago after mechanical fall and was found to have 4mm parafalcine SDH. Repeat CTH was stable. Pt was discharged home on keppra and instructed to hold his blood thinners. On 10/17/24 pt was lethargic and fell. He was brought back to  ED. Blood glucose was >600 and he was febrile (101F). CTH revealed 5cm R frontal IPH with 7mm MLS. Kcentra, 10mg vitamin K, and 3% HTS bolus were given. He was transferred to [facility] for higher level of care.

## 2024-10-18 NOTE — ASSESSMENT & PLAN NOTE
83M PMHx of HTN, HLD, DM2, CAD s/p CABG x2, Afib on Coumadin, ILD on 4L of O2, HFrEF (45%), presenting as a transfer for a large traumatic R frontal IPH reversed with Kcentra and vitamin K with repeat INR 1.2.    Admit to [facility]  q1h neuro checks, vitals, and I&O's  CBC, CMP, mag, and phos daily   Coags, TSH, A1c, lipid panel, UA  Echo, EKG, CXR  Hold AC/AP  4h interval CTH   Given unwitnessed fall, may consider MRI/MRA for further evaluation and would like to avoid CTA with current LEANDRO on CKD  SBP <140  Na > 140, consider adding prn 3% HTS boluses for Na < 140 once pseudohyponatremia resolves  Keppra 500mg BID x7d   Neurosurgery consulted   NPO   SCDs; hold chemical VTE ppx in acute setting   PT/OT/SLP

## 2024-10-18 NOTE — ASSESSMENT & PLAN NOTE
Cr 1.8 on initial presentation to ED and now 2.2    Avoid nephrotoxic agents  Renally dose medications  Q 1 hour I&Os  See LEANDRO

## 2024-10-18 NOTE — ASSESSMENT & PLAN NOTE
06/29/2024 echo with EF 45%, was previously 30-40%    Repeat echo  Resumed home Coreg  Consider resuming other CHF medications after med rec with pharmacy in AM

## 2024-10-18 NOTE — ASSESSMENT & PLAN NOTE
SBP goal < 140  Cardene gtt   Prn labetalol and hydralazine   Home coreg 3.125mg BID and valsartan 40mg qd

## 2024-10-18 NOTE — SUBJECTIVE & OBJECTIVE
"Medications Prior to Admission   Medication Sig Dispense Refill Last Dose/Taking    acetaminophen (TYLENOL) 325 MG tablet Take 2 tablets (650 mg total) by mouth every 6 (six) hours as needed. 13 tablet 0     acetaminophen (TYLENOL) 500 MG tablet Take 1 tablet (500 mg total) by mouth every 6 (six) hours as needed. 13 tablet 0     albuterol (PROVENTIL) 2.5 mg /3 mL (0.083 %) nebulizer solution Take 3 mLs (2.5 mg total) by nebulization every 6 to 8 hours as needed for Wheezing. 100 mL 3     albuterol (PROVENTIL/VENTOLIN HFA) 90 mcg/actuation inhaler INHALE 2 PUFFS BY MOUTH EVERY 6 HOURS AS NEEDED FOR WHEEZING OR  SHORTNESS  OF  BREATH 8 g 3     atorvastatin (LIPITOR) 40 MG tablet Take 40 mg by mouth once daily.       atorvastatin (LIPITOR) 40 MG tablet Take 1 tablet by mouth every morning.       BD INSULIN PEN NEEDLE UF SHORT 31 gauge x 5/16" Ndle        BD INSULIN SYRINGE ULTRA-FINE 1/2 mL 31 gauge x 15/64" Syrg        blood sugar diagnostic Strp To check BG 3 times daily, to use with insurance preferred meter 200 strip 11     carvediloL (COREG) 3.125 MG tablet Take 1 tablet (3.125 mg total) by mouth 2 (two) times daily with meals. 180 tablet 0     ceramides 1,3,6-II (CERAVE DAILY MOISTURIZING) Lotn Apply twice daily to skin 355 mL 1     cinnamon bark 500 mg capsule Take 1,000 mg by mouth once daily.         clopidogreL (PLAVIX) 75 mg tablet Take 75 mg by mouth.       diclofenac sodium (VOLTAREN) 1 % Gel Apply 2 g topically 3 (three) times daily. 50 g 0     DULoxetine (CYMBALTA) 60 MG capsule Take 1 capsule (60 mg total) by mouth once daily. 90 capsule 3     fluticasone propionate (FLONASE) 50 mcg/actuation nasal spray 1 spray (50 mcg total) by Each Nostril route 2 (two) times daily. 18.2 mL 3     furosemide (LASIX) 40 MG tablet Take 40 mg by mouth once daily.       gabapentin (NEURONTIN) 300 MG capsule Take 4 capsules (1,200 mg total) by mouth 2 (two) times daily. 540 capsule 1     glimepiride (AMARYL) 4 MG tablet " "Take 4 mg by mouth daily with breakfast.        HYDROcodone-acetaminophen (NORCO) 7.5-325 mg per tablet Take 1 tablet by mouth every 8 (eight) hours as needed for Pain. 90 tablet 0     [START ON 11/3/2024] HYDROcodone-acetaminophen (NORCO) 7.5-325 mg per tablet Take 1 tablet by mouth every 8 (eight) hours as needed for Pain. 90 tablet 0     insulin NPH-insulin regular, 70/30, (NOVOLIN 70/30) 100 unit/mL (70-30) injection Inject into the skin. Inject 10 units at breakfast and inject 10 units at night       insulin syringe-needle U-100 0.3 mL 31 gauge x 15/64" Syrg USE TO INJECT INSULIN THREE TIMES DAILY       insulin syringe-needle U-100 1/2 mL 31 x 5/16" Syrg        ipratropium (ATROVENT) 42 mcg (0.06 %) nasal spray 2 sprays by Nasal route every 6 (six) hours as needed for Rhinitis (use as needed for runny nose and also use 15 minutes prior to meal). Clean nose with saline prior to use 15 mL 3     isosorbide mononitrate (IMDUR) 30 MG 24 hr tablet Take 30 mg by mouth once daily.       lancets Misc To check BG 3 times daily, to use with insurance preferred meter 200 each 11     levETIRAcetam (KEPPRA) 500 MG Tab Take 1 tablet (500 mg total) by mouth 2 (two) times daily. for 7 days 14 tablet 0     LIDOcaine (LIDODERM) 5 % Place 1 patch onto the skin once daily. Remove & Discard patch within 12 hours or as directed by MD 5 patch 1     meclizine (ANTIVERT) 12.5 mg tablet Take 1 tablet (12.5 mg total) by mouth 3 (three) times daily as needed for Dizziness. 90 tablet 0     methocarbamoL (ROBAXIN) 500 MG Tab TAKE 1 TABLET BY MOUTH 4 TIMES DAILY FOR 10 DAYS 60 tablet 0     mupirocin (BACTROBAN) 2 % ointment Apply topically 3 (three) times daily. 1 g 1     nitroGLYCERIN (NITROSTAT) 0.4 MG SL tablet DISSOLVE 1 TABLET UNDER THE TONGUE EVERY 5 MINUTES AS  NEEDED FOR CHEST PAIN. MAX  OF 3 TABLETS IN 15 MINUTES. CALL 911 IF PAIN PERSISTS.       omeprazole (PRILOSEC) 20 MG capsule Take 1 capsule (20 mg total) by mouth once daily. 30 " capsule 2     pravastatin (PRAVACHOL) 20 MG tablet Take 1 tablet (20 mg total) by mouth once daily. 90 tablet 1     spironolactone (ALDACTONE) 25 MG tablet Take 0.5 tablets (12.5 mg total) by mouth once daily.       tiotropium-olodateroL (STIOLTO RESPIMAT) 2.5-2.5 mcg/actuation Mist Inhale 2 puffs into the lungs once daily. Controller 1 g 11     triamcinolone acetonide 0.1% (KENALOG) 0.1 % cream SMARTSI Application Topical 2-3 Times Daily       valsartan (DIOVAN) 40 MG tablet Take 1 tablet (40 mg total) by mouth once daily. 90 tablet 0     VIT C/VIT E AC/LUT/COPPER/ZINC (PRESERVISION LUTEIN ORAL) Take by mouth.       warfarin (COUMADIN) 5 MG tablet Take 2 tabs by mouth on Tuesday,Thursday, Saturday and Sundays then 1.5 on all other days.          Review of patient's allergies indicates:   Allergen Reactions    Aricept odt [donepezil] Diarrhea and Nausea And Vomiting    Codeine Nausea Only     weak    Ranexa [ranolazine]        Past Medical History:   Diagnosis Date    Allergy     Arthritis     CAD, multiple vessel     DR Melissa    Cataract     Depression     History of colon polyps     Hyperlipidemia     Hypertension     Macular degeneration     Dr Razo for injection, Jennifer for eye    S/P CABG x 2     Type 2 diabetes mellitus with circulatory disorder     Dr. Aquino     Past Surgical History:   Procedure Laterality Date    broken elbow      left    COLONOSCOPY N/A 10/4/2017    Procedure: COLONOSCOPY;  Surgeon: Gabriel Leung MD;  Location: [facility];  Service: Endoscopy;  Laterality: N/A;    EYE SURGERY      cataract    heart bypass      2004    HERNIA REPAIR      right    ROTATOR CUFF REPAIR      right  2004     Family History       Problem Relation (Age of Onset)    Arthritis Mother    Cancer Brother, Maternal Grandfather    Diabetes Mother, Maternal Aunt, Maternal Uncle, Paternal Aunt    Heart attack Father    Heart disease Maternal Uncle, Paternal Aunt, Paternal Uncle, Maternal Grandmother    Stroke  Mother    Throat cancer Brother          Tobacco Use    Smoking status: Former     Current packs/day: 0.00     Average packs/day: 3.0 packs/day for 45.0 years (135.0 ttl pk-yrs)     Types: Cigarettes     Start date: 1959     Quit date: 2004     Years since quittin.5    Smokeless tobacco: Former   Substance and Sexual Activity    Alcohol use: Yes     Comment: was heavy drinker 35 years ago, rare use now    Drug use: No    Sexual activity: Yes     Partners: Female     Review of Systems   Unable to perform ROS: Acuity of condition     Objective:     Weight: 83.9 kg (184 lb 14.4 oz)  Body mass index is 28.11 kg/m².  Vital Signs (Most Recent):  Temp: 97.6 °F (36.4 °C) (10/17/24 2311)  Pulse: 91 (10/17/24 2311)  Resp: 14 (10/17/24 2311)  BP: (!) 162/80 (10/17/24 2311)  SpO2: (!) 92 % (10/17/24 2311) Vital Signs (24h Range):  Temp:  [97.6 °F (36.4 °C)-101 °F (38.3 °C)] 97.6 °F (36.4 °C)  Pulse:  [80-97] 91  Resp:  [14-20] 14  SpO2:  [92 %-100 %] 92 %  BP: (115-197)/() 162/80                              Urethral Catheter 10/17/24 2012 Straight-tip 16 Fr. (Active)          Physical Exam  Vitals reviewed.   Constitutional:       General: He is not in acute distress.  HENT:      Head: Normocephalic.   Eyes:      Extraocular Movements: Extraocular movements intact.      Pupils: Pupils are equal, round, and reactive to light.      Comments: Right eye difficult to open   Cardiovascular:      Rate and Rhythm: Normal rate.   Pulmonary:      Effort: Pulmonary effort is normal.   Abdominal:      General: There is no distension.   Musculoskeletal:         General: Normal range of motion.      Cervical back: Normal range of motion.   Skin:     General: Skin is warm and dry.   Neurological:      Mental Status: He is disoriented.      Cranial Nerves: No cranial nerve deficit.      Motor: No weakness.          GENERAL: resting comfortably  HEENT: PERRL, mucous membranes moist  NECK: supple, trachea midline  CV:  normal capillary refill  PULM: aerating well, symmetric expansion, no distress  ABD: soft, NT, ND  EXT: no c/c/e     NEURO:     GCS 13 E3V4M6  AAO x 2 (missed year)  CN II-XII grossly intact  Fc x 4 antigravity  SILT     Non-cooperative with drift and dysmetria exam        Significant Labs:  Recent Labs   Lab 10/16/24  1335 10/17/24  1928   * 503*   * 132*   K 4.9 4.8   CL 98 96   CO2 27 25   BUN 32* 31*   CREATININE 1.8* 2.2*   CALCIUM 9.4 9.2     Recent Labs   Lab 10/16/24  1335 10/17/24  1928 10/17/24  1934   WBC 8.78 9.24  --    HGB 13.0* 11.9*  --    HCT 39.0* 37.0* 37    197  --      Recent Labs   Lab 10/16/24  1335 10/17/24  2017 10/17/24  2335   INR 3.9* 2.6* 1.2   APTT 47.0*  --  27.1     Microbiology Results (last 7 days)       Procedure Component Value Units Date/Time    Blood culture [[id number]]     Order Status: No result Specimen: Blood     Blood culture [[id number]]     Order Status: No result Specimen: Blood     Culture, Respiratory with Gram Stain [[id number]]     Order Status: No result Specimen: Respiratory           All pertinent labs from the last 24 hours have been reviewed.    Significant Diagnostics:  CT: CT Head Without Contrast    Result Date: 10/17/2024  Large 5 cm intraparenchymal hemorrhage centered in the right frontal lobe.  Additional small amount of acute subdural hemorrhage seen along the right aspect of the interhemispheric falx.  Apparent small subarachnoid component of hemorrhage is also seen with small amount of layering blood also seen within the posterior horns of the lateral ventricles. COMMUNICATION This critical result was discovered/received on 10/17/2024 at 19:49. The critical information above was relayed directly by Ricardo Rivera MD by telephone to Dr. Krystina Fitzpatrick on 10/17/2024 at 19:49. Electronically signed by: Ricardo Rivera MD Date:    10/17/2024 Time:    19:56   MRI: No results found in the last 24 hours.  I have reviewed all pertinent  imaging results/findings within the past 24 hours.  I have reviewed and interpreted all pertinent imaging results/findings within the past 24 hours.

## 2024-10-18 NOTE — H&P
[facility]  Neurocritical Care  History & Physical    Admit Date: 10/17/2024  Service Date: 10/18/2024  Length of Stay: 1    Subjective:     Chief Complaint: Traumatic right-sided intracerebral hemorrhage without loss of consciousness    History of Present Illness: Percy Ramirez is a 83M PMHx of HTN, HLD, DM2, CAD s/p CABG x2, Afib on Coumadin, ILD on 4L of O2, HFrEF (45%), presenting as a transfer for a large traumatic R frontal IPH. Pt initially presented to [facility] ED yesterday ago after mechanical fall and was found to have 4mm parafalcine SDH. Repeat CTH was stable. Pt was discharged home on keppra and instructed to hold his blood thinners. On 10/17/24 pt was lethargic and fell. He was brought back to  ED. Blood glucose was >600 and he was febrile (101F). CTH revealed 5cm R frontal IPH with 7mm MLS. Kcentra, 10mg vitamin K, and 3% HTS bolus were given. He was transferred to [facility] for higher level of care.       Past Medical History:   Diagnosis Date    Allergy     Arthritis     CAD, multiple vessel     DR Melissa    Cataract     Depression     History of colon polyps     Hyperlipidemia     Hypertension     Macular degeneration     Dr Razo for injection, Jennifer for eye    S/P CABG x 2     Type 2 diabetes mellitus with circulatory disorder     Dr. Aquino     Past Surgical History:   Procedure Laterality Date    broken elbow      left    COLONOSCOPY N/A 10/4/2017    Procedure: COLONOSCOPY;  Surgeon: Gabriel Leung MD;  Location: [facility];  Service: Endoscopy;  Laterality: N/A;    EYE SURGERY      cataract    heart bypass      2004    HERNIA REPAIR      right    ROTATOR CUFF REPAIR      right  2004      No current facility-administered medications on file prior to encounter.     Current Outpatient Medications on File Prior to Encounter   Medication Sig Dispense Refill    acetaminophen (TYLENOL) 325 MG tablet Take 2 tablets (650 mg total) by mouth every 6 (six) hours as needed. 13  "tablet 0    acetaminophen (TYLENOL) 500 MG tablet Take 1 tablet (500 mg total) by mouth every 6 (six) hours as needed. 13 tablet 0    albuterol (PROVENTIL) 2.5 mg /3 mL (0.083 %) nebulizer solution Take 3 mLs (2.5 mg total) by nebulization every 6 to 8 hours as needed for Wheezing. 100 mL 3    albuterol (PROVENTIL/VENTOLIN HFA) 90 mcg/actuation inhaler INHALE 2 PUFFS BY MOUTH EVERY 6 HOURS AS NEEDED FOR WHEEZING OR  SHORTNESS  OF  BREATH 8 g 3    atorvastatin (LIPITOR) 40 MG tablet Take 40 mg by mouth once daily.      atorvastatin (LIPITOR) 40 MG tablet Take 1 tablet by mouth every morning.      BD INSULIN PEN NEEDLE UF SHORT 31 gauge x 5/16" Ndle       BD INSULIN SYRINGE ULTRA-FINE 1/2 mL 31 gauge x 15/64" Syrg       blood sugar diagnostic Strp To check BG 3 times daily, to use with insurance preferred meter 200 strip 11    carvediloL (COREG) 3.125 MG tablet Take 1 tablet (3.125 mg total) by mouth 2 (two) times daily with meals. 180 tablet 0    ceramides 1,3,6-II (CERAVE DAILY MOISTURIZING) Lotn Apply twice daily to skin 355 mL 1    cinnamon bark 500 mg capsule Take 1,000 mg by mouth once daily.        clopidogreL (PLAVIX) 75 mg tablet Take 75 mg by mouth.      diclofenac sodium (VOLTAREN) 1 % Gel Apply 2 g topically 3 (three) times daily. 50 g 0    DULoxetine (CYMBALTA) 60 MG capsule Take 1 capsule (60 mg total) by mouth once daily. 90 capsule 3    fluticasone propionate (FLONASE) 50 mcg/actuation nasal spray 1 spray (50 mcg total) by Each Nostril route 2 (two) times daily. 18.2 mL 3    furosemide (LASIX) 40 MG tablet Take 40 mg by mouth once daily.      gabapentin (NEURONTIN) 300 MG capsule Take 4 capsules (1,200 mg total) by mouth 2 (two) times daily. 540 capsule 1    glimepiride (AMARYL) 4 MG tablet Take 4 mg by mouth daily with breakfast.       HYDROcodone-acetaminophen (NORCO) 7.5-325 mg per tablet Take 1 tablet by mouth every 8 (eight) hours as needed for Pain. 90 tablet 0    [START ON 11/3/2024] " "HYDROcodone-acetaminophen (NORCO) 7.5-325 mg per tablet Take 1 tablet by mouth every 8 (eight) hours as needed for Pain. 90 tablet 0    insulin NPH-insulin regular, 70/30, (NOVOLIN 70/30) 100 unit/mL (70-30) injection Inject into the skin. Inject 10 units at breakfast and inject 10 units at night      insulin syringe-needle U-100 0.3 mL 31 gauge x 15/64" Syrg USE TO INJECT INSULIN THREE TIMES DAILY      insulin syringe-needle U-100 1/2 mL 31 x 5/16" Syrg       ipratropium (ATROVENT) 42 mcg (0.06 %) nasal spray 2 sprays by Nasal route every 6 (six) hours as needed for Rhinitis (use as needed for runny nose and also use 15 minutes prior to meal). Clean nose with saline prior to use 15 mL 3    isosorbide mononitrate (IMDUR) 30 MG 24 hr tablet Take 30 mg by mouth once daily.      lancets Misc To check BG 3 times daily, to use with insurance preferred meter 200 each 11    levETIRAcetam (KEPPRA) 500 MG Tab Take 1 tablet (500 mg total) by mouth 2 (two) times daily. for 7 days 14 tablet 0    LIDOcaine (LIDODERM) 5 % Place 1 patch onto the skin once daily. Remove & Discard patch within 12 hours or as directed by MD 5 patch 1    meclizine (ANTIVERT) 12.5 mg tablet Take 1 tablet (12.5 mg total) by mouth 3 (three) times daily as needed for Dizziness. 90 tablet 0    methocarbamoL (ROBAXIN) 500 MG Tab TAKE 1 TABLET BY MOUTH 4 TIMES DAILY FOR 10 DAYS 60 tablet 0    mupirocin (BACTROBAN) 2 % ointment Apply topically 3 (three) times daily. 1 g 1    nitroGLYCERIN (NITROSTAT) 0.4 MG SL tablet DISSOLVE 1 TABLET UNDER THE TONGUE EVERY 5 MINUTES AS  NEEDED FOR CHEST PAIN. MAX  OF 3 TABLETS IN 15 MINUTES. CALL 911 IF PAIN PERSISTS.      omeprazole (PRILOSEC) 20 MG capsule Take 1 capsule (20 mg total) by mouth once daily. 30 capsule 2    pravastatin (PRAVACHOL) 20 MG tablet Take 1 tablet (20 mg total) by mouth once daily. 90 tablet 1    spironolactone (ALDACTONE) 25 MG tablet Take 0.5 tablets (12.5 mg total) by mouth once daily.      " tiotropium-olodateroL (STIOLTO RESPIMAT) 2.5-2.5 mcg/actuation Mist Inhale 2 puffs into the lungs once daily. Controller 1 g 11    triamcinolone acetonide 0.1% (KENALOG) 0.1 % cream SMARTSI Application Topical 2-3 Times Daily      valsartan (DIOVAN) 40 MG tablet Take 1 tablet (40 mg total) by mouth once daily. 90 tablet 0    VIT C/VIT E AC/LUT/COPPER/ZINC (PRESERVISION LUTEIN ORAL) Take by mouth.      warfarin (COUMADIN) 5 MG tablet Take 2 tabs by mouth on Tuesday,Thursday, Saturday and Sundays then 1.5 on all other days.        Allergies: Aricept odt [donepezil], Codeine, and Ranexa [ranolazine]  Family History   Problem Relation Name Age of Onset    Arthritis Mother      Diabetes Mother      Stroke Mother      Heart attack Father      Cancer Brother      Throat cancer Brother      Diabetes Maternal Aunt      Diabetes Maternal Uncle      Heart disease Maternal Uncle      Diabetes Paternal Aunt      Heart disease Paternal Aunt      Heart disease Paternal Uncle      Heart disease Maternal Grandmother      Cancer Maternal Grandfather       Social History     Tobacco Use    Smoking status: Former     Current packs/day: 0.00     Average packs/day: 3.0 packs/day for 45.0 years (135.0 ttl pk-yrs)     Types: Cigarettes     Start date: 1959     Quit date: 2004     Years since quittin.5    Smokeless tobacco: Former   Substance Use Topics    Alcohol use: Yes     Comment: was heavy drinker 35 years ago, rare use now    Drug use: No     Review of Systems   Unable to perform ROS: Acuity of condition     Objective:     Vitals:    Temp: 97.6 °F (36.4 °C)  Pulse: 91  BP: (!) 162/80  MAP (mmHg): 114  Resp: 14  SpO2: (!) 92 %    Temp  Min: 97.6 °F (36.4 °C)  Max: 101 °F (38.3 °C)  Pulse  Min: 80  Max: 97  BP  Min: 115/57  Max: 197/88  MAP (mmHg)  Min: 80  Max: 124  Resp  Min: 14  Max: 20  SpO2  Min: 92 %  Max: 100 %    10/17 0701 - 10/18 0700  In: -   Out: 550 [Urine:550]            Physical Exam  Vitals and nursing  note reviewed.   Constitutional:       Appearance: He is normal weight.   HENT:      Head: Normocephalic and atraumatic.      Right Ear: External ear normal.      Left Ear: External ear normal.      Nose: Nose normal.      Mouth/Throat:      Mouth: Mucous membranes are dry.      Pharynx: Oropharynx is clear.   Eyes:      Extraocular Movements: Extraocular movements intact.      Conjunctiva/sclera: Conjunctivae normal.   Cardiovascular:      Rate and Rhythm: Tachycardia present. Rhythm irregular.   Pulmonary:      Effort: Pulmonary effort is normal. No respiratory distress.   Abdominal:      General: There is no distension.      Palpations: Abdomen is soft.      Tenderness: There is no abdominal tenderness. There is no guarding.   Musculoskeletal:      Cervical back: Normal range of motion.      Right lower leg: No edema.      Left lower leg: No edema.   Skin:     General: Skin is warm and dry.      Capillary Refill: Capillary refill takes less than 2 seconds.      Findings: Bruising present.   Neurological:      Comments: E1 V1 M6  Not opening eyes to noxious. Not answering any questions or making any noises; however, RN reported pt was Ox3 on arrival about 30 min prior to this exam. Following commands after several attempts/ bright light in eyes and excessive encouragement (gave thumbs up in RUE, wiggled toes, open mouth, stuck out tongue)  CN II-XII grossly intact, specifically:  PERRL (L sluggish compared to R). Eyes cross midline. Blinks to threat bilaterally.   No facial asymmetry.   Tongue midline.   Shoulder shrug symmetric.  Motor:  BUE following commands with encouragement and moving spontaneously and antigravity   BLE moving spontaneously, lifting knees off of bed, wiggles toes to command  Sensation intact to noxious stimuli x4       Unable to test orientation, language, memory, judgment, insight, fund of knowledge, coordination, gait due to level of consciousness.       Today I personally reviewed  pertinent medications, lines/drains/airways, imaging, cardiology results, laboratory results, microbiology results, notably:    PT 12.9 from 27.3, INR 1.2 from 2.6  aPTT 27 from 47    Cr 2.2 from 1.8   BUN 31  Na 132 and , corrected Na 138  Beta hydroxybutyrate 0.2, UA negative for ketones   HbA1c 9.9%    CTH 10/17/24 @ 1948  Large 5 cm intraparenchymal hemorrhage centered in the right frontal lobe. Additional small amount of acute subdural hemorrhage seen along the right aspect of the interhemispheric falx. Apparent small subarachnoid component of hemorrhage is also seen with small amount of layering blood also seen within the posterior horns of the lateral ventricles.     Assessment/Plan:     Neuro  * Traumatic right-sided intracerebral hemorrhage without loss of consciousness  83M PMHx of HTN, HLD, DM2, CAD s/p CABG x2, Afib on Coumadin, ILD on 4L of O2, HFrEF (45%), presenting as a transfer for a large traumatic R frontal IPH reversed with Kcentra and vitamin K with repeat INR 1.2.    Admit to [facility]  q1h neuro checks, vitals, and I&O's  CBC, CMP, mag, and phos daily   Coags, TSH, A1c, lipid panel, UA  Echo, EKG, CXR  Hold AC/AP  4h interval CTH   Given unwitnessed fall, may consider MRI/MRA for further evaluation and would like to avoid CTA with current LEANDRO on CKD  SBP <140  Na > 140, consider adding prn 3% HTS boluses for Na < 140 once pseudohyponatremia resolves  Keppra 500mg BID x7d   Neurosurgery consulted   NPO   SCDs; hold chemical VTE ppx in acute setting   PT/OT/SLP      Psychiatric  Major depressive disorder, recurrent episode, mild  Home SSRI once able to take p.o.    Pulmonary  ILD (interstitial lung disease)  ILD on 4L of O2    SpO2 goal > 88%  Duo nebs and tiotropium    Cardiac/Vascular  Chronic combined systolic and diastolic heart failure  06/29/2024 echo with EF 45%, was previously 30-40%    Repeat echo  Resumed home Coreg  Consider resuming other CHF medications after med rec with  pharmacy in AM    Persistent atrial fibrillation  In atrial fibrillation on admit  Hold Coumadin in setting of acute intraparenchymal hemorrhage    Hyperlipidemia LDL goal <100  Lipid panel  Atorvastatin    Coronary artery disease  S/p CABG x2 in 2004  Patient has reportedly not been on Plavix recently because of his Coumadin    Benign hypertension with chronic kidney disease, stage III  SBP goal < 140  Cardene gtt   Prn labetalol and hydralazine   Home coreg 3.125mg BID and valsartan 40mg qd      Renal/  LEANDRO (acute kidney injury)  LEANDRO on CKD, Cr 1.8 on initial presentation to ED and now 2.2    Avoid nephrotoxic agents  Renally dose medications  Q 1 hour I&Os  IVF    Chronic kidney disease, stage 3a  Cr 1.8 on initial presentation to ED and now 2.2    Avoid nephrotoxic agents  Renally dose medications  Q 1 hour I&Os  See LEANDRO    Endocrine  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 Beta hydroxybutyrate and urine ketones negative. Serum CO2 22. Given SQ insulin at OSH.  [place] [facility] [place] Hb A1c 9.9% on admit.     Insulin gtt  Gentle IVF given HFrEF  Serum osmolality pending     Other  Obstructive sleep apnea treated with BiPAP  Hold off on BiPAP as patient is not alert enough to be able to remove mask/concern for aspiration risk   PRN ABG          The patient is being Prophylaxed for:  Venous Thromboembolism with: Mechanical  Stress Ulcer with: Not Applicable   Ventilator Pneumonia with: not applicable    Activity Orders            Turn patient starting at 10/18 0000    Elevate HOB starting at 10/17 2318    Diet NPO Except for: Medication: NPO starting at 10/17 2318    Bed rest starting at 10/17 1958    Elevate HOB 30 (30-45 Degrees) starting at 10/17 1958          Full Code    Critical care time spent on the evaluation and treatment of severe organ dysfunction, review of pertinent labs and imaging studies, discussions with consulting providers  and discussions with patient/family: 60 minutes.    Joshua Thomas, PA-C  Neurocritical Care  [facility]

## 2024-10-18 NOTE — PROCEDURES
Central Line    Date/Time: 10/18/2024 5:27 PM    Performed by: Abraham Jeffery MD  Authorized by: Khris Cote MD    Supervisor Name:  Zheng Sykes MD  Location procedure was performed:  [facility] NEURO CRITICAL C[facility]  Assisting Provider:  Khris Cote MD  Consent Done ?:  Yes  Time out complete?: Verified correct patient, procedure, equipment, staff, and site/side    Indications:  Med administration and vascular access  Anesthesia:  Local infiltration  Local anesthetic:  Lidocaine 1% without epinephrine  Anesthetic total (ml):  3  Preparation:  Skin prepped with ChloraPrep  Skin prep agent dried: Skin prep agent completely dried prior to procedure    Sterile barriers: All five maximal sterile barriers used - gloves, gown, cap, mask and large sterile sheet    Hand hygiene: Hand hygiene performed immediately prior to central venous catheter insertion    Location:  Right internal jugular  Catheter type:  Triple lumen  Catheter size:  7 Fr  Ultrasound guidance: Yes     patent  Comprressibility:  Normal  Needle advanced into vessel with real time ultrasound guidance.    Guidewire confirmed in vessel.    Steril sheath on probe.    Sterile gel used.  Manometry: Yes    Number of attempts:  1  Securement:  Line sutured, sterile dressing applied and blood return through all ports  Complications: No    Estimated blood loss (mL):  3  Specimens: No    Implants: No    Guidewire: guidewire removed intact, verified with nurse    XRay:  Placement verified by x-ray and successful placement  Adverse Events:  NoneTermination Site: superior vena cava

## 2024-10-18 NOTE — PT/OT/SLP PROGRESS
Occupational Therapy      Patient Name:  Percy Ramirez   MRN:  [id number]    Patient not seen today secondary to pt taken to OR for Class A R supraorbital crani for clot evac. Will follow-up pending new orders post op.      10/18/2024

## 2024-10-18 NOTE — NURSING
CT completed    Pt transferred via bed with oxygen, cardiac monitor, ambu bag, and 2 Rns    Pt back from CT, back on bedside monitor, call light within reach, bed locked, bed alarm on.

## 2024-10-18 NOTE — ED TRIAGE NOTES
Patient presents to the ED via EMS from home for AMS. Per daughter, Pt was seen at this ED yesterday after sustaining a head bleed from a fall, was d/c after repeat CT showed bleed was stable. Reports the Pt has been very lethargic throughout the day and had another unwitnessed fall this afternoon, reports caregiver gave him half a Norco and Keppra prior to activating EMS d/t Pt stating his neck/head hurt. Upon arrival to ED room Pt noted to be lethargic, disoriented, and slow to respond to questions. MD at bedside to evaluate Pt.

## 2024-10-18 NOTE — NURSING
Pt brought to 2nd floor CT for Pre-Op Stealth Scan on cardiac monitor and 3L NC. Pt dropped off at surgery desk for R crani. Handed off chart and pt to CRNA and RN. Daughter at bedside and updated.

## 2024-10-18 NOTE — CARE UPDATE
CTA revealed enlarging right sided SDH collection with active contrast extravasation into the extra-axial space. Plan for Class B to OR for craniotomy for SDH evacuation. Daughters updated via phone call. Consent obtained over the phone. All questions answered.

## 2024-10-18 NOTE — PLAN OF CARE
[facility] Care Plan  POC reviewed with Percy Ramirez and family at 1800. Family verbalized understanding. Questions and concerns addressed. Pt progressing toward goals. Will continue to monitor. See below and flowsheets for full assessment and VS info.     - Stealth CT Stroke Complete  - Class A R Supraorbital Crani for Clot Evac complete  - Post-Op CT Complete  - 3% Saline Bolus x1  - NC @ 7L  - Cardene gtt @ 12.5  - Insulin gtt titrated  - Plan tonight for Class B to OR for Crani for SDH evacuation  - Longoria in place  - A-line placed  - R IJ TLC placed    Is this a stroke patient? yes- Stroke booklet reviewed with patient and family, risk factors identified for patient and stroke booklet remains at bedside for ongoing education.       Neuro:  [place] Coma Scale  Best Eye Response: 1-->(E1) none  Best Motor Response: 4-->(M4) withdraws from pain  Best Verbal Response: 2-->(V2) incomprehensible speech  [place] Coma Scale Score: 7  Assessment Qualifiers: patient not sedated/intubated  Pupil PERRLA: yes     24 hr Temp:  [97.6 °F (36.4 °C)-101 °F (38.3 °C)]     CV:   Rhythm: normal sinus rhythm, atrial rhythm  BP goals:   SBP < 140  MAP > 65    Resp:    NC @ 7L/min       Plan: N/A    GI/:     Diet/Nutrition Received: NPO  Last Bowel Movement: 10/18/24  Voiding Characteristics: urethral catheter (bladder)    Intake/Output Summary (Last 24 hours) at 10/18/2024 1828  Last data filed at 10/18/2024 1802  Gross per 24 hour   Intake 2741.86 ml   Output 2230 ml   Net 511.86 ml          Labs/Accuchecks:  Recent Labs   Lab 10/18/24  0054   WBC 11.72   RBC 4.05*   HGB 11.6*   HCT 34.5*         Recent Labs   Lab 10/18/24  0054 10/18/24  0319 10/18/24  1547 10/18/24  1731   *  134*   < > 136 140   K 5.2*  5.2*   < > 5.2*  --    CO2 22*  22*   < > 21*  --      102   < > 108  --    BUN 29*  29*   < > 26*  --    CREATININE 2.0*  2.0*   < > 1.6*  --    ALKPHOS 95  --   --   --    ALT 12  --   --   --    AST 14   --   --   --    BILITOT 1.0  --   --   --     < > = values in this interval not displayed.      Recent Labs   Lab 10/17/24  2335 10/18/24  1731   INR 1.2 1.1   APTT 27.1  --       Recent Labs   Lab 10/17/24  1928   TROPONINI 0.022       Electrolytes: N/A - electrolytes WDL  Accuchecks: q1hr; insulin gtt    Gtts:   D5 and 0.45% NaCl   Intravenous Continuous PRN        insulin regular 1 units/mL infusion orderable (DKA)  0-52 Units/hr Intravenous Continuous 3.6 mL/hr at 10/18/24 1802 3.6 Units/hr at 10/18/24 1802    nicardipine  0-15 mg/hr Intravenous Continuous 62.5 mL/hr at 10/18/24 1802 12.5 mg/hr at 10/18/24 1802         Mykel Risk Assessment  Sensory Perception: 4-->no impairment  Moisture: 4-->rarely moist  Activity: 1-->bedfast  Mobility: 2-->very limited  Nutrition: 2-->probably inadequate  Friction and Shear: 3-->no apparent problem  Mykel Score: 16    Is your mykel score 12 or less? no      Nurses Note -- 4 Eyes    Is there altered skin present?  Yes; R crani incision  Second RN/Staff Member:  RADHA Pham    [facility]

## 2024-10-18 NOTE — PLAN OF CARE
Regular diet with thin liquids recommended.  ONLY FEED PT. When FULL ALERT   Problem: SLP  Goal: SLP Goal  Description: Goals due 10/25  1. Tolerate regular diet with thin liquids with no s/s of aspiration  2.  Assess functional reading, writing, visual spatial skills  3.  Respond to verbal problem solving tasks with 80% accuracy  Outcome: Progressing

## 2024-10-18 NOTE — CONSULTS
"[facility] - Neuro Critical Care  Adult Nutrition  Consult Note    SUMMARY     Recommendations    Advance to diabetic diet, texture per SLP.   RD to monitor and follow up.    Goals: Meet % EEN/EPN by RD follow up.  Nutrition Goal Status: new  Communication of RD Recs: other (comment) (POC)    Assessment and Plan    Nutrition Problem  Inadequate oral intake    Related to (etiology):   Inability to consume sufficient nutrients     Signs and Symptoms (as evidenced by):   NPO due to surgery     Interventions/Recommendations (treatment strategy):  Collab of nutrition care w/ other providers     Nutrition Diagnosis Status:   New        Reason for Assessment    Reason For Assessment: consult  Diagnosis: other (see comments) (Traumatic right-sided intracerebral hemorrhage without loss of consciousness)  General Information Comments: Consulted to assess needs. Pt w/ a PMHx of HTN, HLD, DM, CAD, CABG x 2. Pt in surgery at time of visit, daughter provided nutrition hx. She states pt's typical intake at 2-3 meals/day. Reports pt's UBW at 180-190 lbs, pt currently within that range. Malnutrition not suspected, will reassess at f/u if warranted. Noted A1C 9.9%, pt to be educated at f/u.  Nutrition Discharge Planning: Diabetic diet    Nutrition Related Social Determinants of Health: SDOH: None Identified      Nutrition Risk Screen    Nutrition Risk Screen: no indicators present    Nutrition/Diet History    Typical Food/Fluid Intake: Breakfast: yogurt, bananas; Lunch: salad, sandwich; Dinner: katlin steak, mashed potatoes  Spiritual, Cultural Beliefs, Faith Practices, Values that Affect Care: no  Vitamin/Mineral/Herbal Supplements: Vit D  Food Allergies: NKFA  Factors Affecting Nutritional Intake: NPO    Anthropometrics    Temp: 97.9 °F (36.6 °C)  Height: 5' 8" (172.7 cm)  Height (inches): 68 in  Weight Method: Bed Scale  Weight: 83.5 kg (184 lb)  Weight (lb): 184 lb  Ideal Body Weight (IBW), Male: 154 lb  % Ideal Body " Weight, Male (lb): 119.48 %  BMI (Calculated): 28  BMI Grade: 25 - 29.9 - overweight  Usual Body Weight (UBW), k.82 kg  % Usual Body Weight: 102.22       Lab/Procedures/Meds    Pertinent Labs Reviewed: reviewed  Pertinent Labs Comments: H/H 11.6/34.5, BUN 26, eGFR 50.5, Glucose 148, Calcium 8.4, Phosphorus 2.1, Albumin 3.4, A1C 9.9  Pertinent Medications Reviewed: reviewed  Pertinent Medications Comments: Atorvastatin, polyethylene glycol, senna-docusate, NaCl, valsartan, insulin        Estimated/Assessed Needs    Weight Used For Calorie Calculations: 83.5 kg (184 lb 1.4 oz)  Energy Calorie Requirements (kcal):   Energy Need Method: [place]-St Jeor (MSJ x 1.25 PAL)  Protein Requirements:  (1.0-1.2 g/kg ABW)  Weight Used For Protein Calculations: 83.5 kg (184 lb 1.4 oz)  Fluid Requirements (mL): Per MD     RDA Method (mL):   CHO Requirement: 237      Nutrition Prescription Ordered    Current Diet Order: NPO    Evaluation of Received Nutrient/Fluid Intake    I/O: - 128.4 net I/O  Comments: LBM 10/18  % Intake of Estimated Energy Needs: 0 - 25 %  % Meal Intake: NPO    Nutrition Risk    Level of Risk/Frequency of Follow-up:  (1x/week)       Monitor and Evaluation    Food and Nutrient Intake: energy intake, food and beverage intake  Food and Nutrient Adminstration: diet order  Knowledge/Beliefs/Attitudes: food and nutrition knowledge/skill  Physical Activity and Function: nutrition-related ADLs and IADLs  Anthropometric Measurements: weight, weight change, body mass index  Biochemical Data, Medical Tests and Procedures: lipid profile, inflammatory profile, glucose/endocrine profile, gastrointestinal profile, electrolyte and renal panel  Nutrition-Focused Physical Findings: overall appearance       Nutrition Follow-Up    RD Follow-up?: Yes    Alva Schaefer, Registration Eligible, Provisional LDN

## 2024-10-18 NOTE — ED PROVIDER NOTES
"Encounter Date: 10/17/2024       History     Chief Complaint   Patient presents with    Fall     Pt BIB EMS after unwitnessed fall today at home with worsening lethargy. Pt seen on yesterday dx with brain bleed and dc home. 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  Daughter reports patient did not want to take any of his medications today and has been "out of it".  She reports he had an unwitnessed fall.  Per EMS patient is significantly hyperglycemic as his blood glucose read >600.  Further history limited due to patient's mental status change      Review of patient's allergies indicates:   Allergen Reactions    Aricept odt [donepezil] Diarrhea and Nausea And Vomiting    Codeine Nausea Only     weak    Ranexa [ranolazine]      Past Medical History:   Diagnosis Date    Allergy     Arthritis     CAD, multiple vessel     DR Melissa    Cataract     Depression     History of colon polyps     Hyperlipidemia     Hypertension     Macular degeneration     Dr Razo for injection, Jennifer for eye    S/P CABG x 2     Type 2 diabetes mellitus with circulatory disorder     Dr. Aquino     Past Surgical History:   Procedure Laterality Date    broken elbow      left    COLONOSCOPY N/A 10/4/2017    Procedure: COLONOSCOPY;  Surgeon: Gabriel Leung MD;  Location: [facility];  Service: Endoscopy;  Laterality: N/A;    EYE SURGERY      cataract    heart bypass      2004    HERNIA REPAIR      right    ROTATOR CUFF REPAIR      right  2004     Family History   Problem Relation Name Age of Onset    Arthritis Mother      Diabetes Mother      Stroke Mother      Heart attack Father      Cancer Brother      Throat cancer Brother      " Diabetes Maternal Aunt      Diabetes Maternal Uncle      Heart disease Maternal Uncle      Diabetes Paternal Aunt      Heart disease Paternal Aunt      Heart disease Paternal Uncle      Heart disease Maternal Grandmother      Cancer Maternal Grandfather       Social History     Tobacco Use    Smoking status: Former     Current packs/day: 0.00     Average packs/day: 3.0 packs/day for 45.0 years (135.0 ttl pk-yrs)     Types: Cigarettes     Start date: 1959     Quit date: 2004     Years since quittin.5    Smokeless tobacco: Former   Substance Use Topics    Alcohol use: Yes     Comment: was heavy drinker 35 years ago, rare use now    Drug use: No     Review of Systems   Unable to perform ROS: Mental status change       Physical Exam     Initial Vitals   BP Pulse Resp Temp SpO2   10/17/24 1857 10/17/24 1857 10/17/24 1857 10/17/24 2010 10/17/24 1857   (!) 150/90 80 18 (!) 101 °F (38.3 °C) 98 %      MAP       --                Physical Exam    Constitutional: He is not diaphoretic. No distress.   HENT: Mouth/Throat: Oropharynx is clear and moist. No oropharyngeal exudate.   No large scalp hematoma   Eyes: Conjunctivae and EOM are normal. Pupils are equal, round, and reactive to light.   Neck: Neck supple.   No midline C-spine tenderness or step-offs   Normal range of motion.  Cardiovascular:  Normal rate and intact distal pulses.           Irregularly irregular rhythm   Pulmonary/Chest: Breath sounds normal. No respiratory distress. He has no wheezes. He has no rhonchi.   Abdominal: Abdomen is soft. There is no abdominal tenderness. There is no rebound and no guarding.   Musculoskeletal:         General: Normal range of motion.      Cervical back: Normal range of motion and neck supple.      Comments: Symmetrical lower extremities   No calf tenderness     Neurological: GCS eye subscore is 3. GCS verbal subscore is 4. GCS motor subscore is 6.   Drowsy but arousable  Moving all extremities  No meningismus    Skin: Skin is warm and dry.         ED Course   Procedures  Labs Reviewed   CBC W/ AUTO DIFFERENTIAL - Abnormal       Result Value    WBC 9.24      RBC 4.32 (*)     Hemoglobin 11.9 (*)     Hematocrit 37.0 (*)     MCV 86      MCH 27.5      MCHC 32.2      RDW 13.8      Platelets 197      MPV 9.9      Immature Granulocytes 0.3      Gran # (ANC) 7.6      Immature Grans (Abs) 0.03      Lymph # 0.9 (*)     Mono # 0.8      Eos # 0.0      Baso # 0.02      nRBC 0      Gran % 82.0 (*)     Lymph % 9.4 (*)     Mono % 8.1      Eosinophil % 0.0      Basophil % 0.2      Differential Method Automated     COMPREHENSIVE METABOLIC PANEL - Abnormal    Sodium 132 (*)     Potassium 4.8      Chloride 96      CO2 25      Glucose 503 (*)     BUN 31 (*)     Creatinine 2.2 (*)     Calcium 9.2      Total Protein 7.5      Albumin 3.6      Total Bilirubin 0.9      Alkaline Phosphatase 105      AST 14      ALT 14      eGFR 29 (*)     Anion Gap 11      Narrative:     GLUCOSE critical result(s) called and verbal readback obtained from   JOHN RIVAS  by DOMINICK 10/17/2024 20:05   URINALYSIS, REFLEX TO URINE CULTURE - Abnormal    Specimen UA Urine, Catheterized      Color, UA Colorless (*)     Appearance, UA Clear      pH, UA 7.0      Specific Gravity, UA 1.010      Protein, UA Negative      Glucose, UA 4+ (*)     Ketones, UA Negative      Bilirubin (UA) Negative      Occult Blood UA Negative      Nitrite, UA Negative      Urobilinogen, UA Negative      Leukocytes, UA Negative      Narrative:     Specimen Source->Urine   B-TYPE NATRIURETIC PEPTIDE - Abnormal     (*)    PROTIME-INR - Abnormal    Prothrombin Time 27.3 (*)     INR 2.6 (*)    POCT GLUCOSE - Abnormal    POCT Glucose >500 (*)    ISTAT PROCEDURE - Abnormal    POC PH 7.381      POC PCO2 52.3 (*)     POC PO2 23 (*)     POC HCO3 31.0 (*)     POC BE 5 (*)     POC SATURATED O2 37      POC TCO2 33 (*)     Sample VENOUS      Site Other      Allens Test N/A     ISTAT PROCEDURE - Abnormal     POC Glucose 478 (*)     POC BUN 34 (*)     POC Creatinine 1.8 (*)     POC Sodium 132 (*)     POC Potassium 4.8      POC Chloride 94 (*)     POC TCO2 (MEASURED) 28      POC Anion Gap 15      POC Ionized Calcium 1.17      POC Hematocrit 37      Sample VENOUS      Site Other      Allens Test N/A     POCT GLUCOSE - Abnormal    POCT Glucose 447 (*)    BETA - HYDROXYBUTYRATE, SERUM    Beta-Hydroxybutyrate 0.2     TROPONIN I    Troponin I 0.022     URINALYSIS MICROSCOPIC    Bacteria None      Yeast, UA None      Microscopic Comment SEE COMMENT      Narrative:     Specimen Source->Urine   SARS-COV-2 RDRP GENE    POC Rapid COVID Negative       Acceptable Yes     POCT INFLUENZA A/B MOLECULAR    POC Molecular Influenza A Ag Negative      POC Molecular Influenza B Ag Negative       Acceptable Yes     TYPE & SCREEN    Specimen Outdate 10/20/2024 23:59     ISTAT CHEM8   POCT GLUCOSE MONITORING CONTINUOUS   POCT PROTIME-INR   PREPARE FRESH FROZEN PLASMA SOFT     EKG Readings: (Independently Interpreted)   Initial Reading: No STEMI. Rhythm: Atrial Fibrillation. Heart Rate: 90. Ectopy: PVCs. ST Segments: Normal ST Segments. T Waves: Normal.       Imaging Results              X-Ray Chest AP Portable (Final result)  Result time 10/17/24 20:01:56      Final result by Ricardo Rivera MD (10/17/24 20:01:56)                   Impression:      As above.      Electronically signed by: Ricardo Rivera MD  Date:    10/17/2024  Time:    20:01               Narrative:    EXAMINATION:  XR CHEST AP PORTABLE    CLINICAL HISTORY:  hyperglycemia;    TECHNIQUE:  Single frontal view of the chest was performed.    COMPARISON:  10/16/2024.    FINDINGS:  Cardiac silhouette is stable in size.  Postsurgical sternotomy changes are seen.  Lungs are symmetrically expanded.  Chronic coarse interstitial lung changes are seen with similar mild bibasilar opacities.  No evidence of new consolidative process, pneumothorax, or  large pleural effusion.  Overall no significant change in cardiopulmonary status from yesterday's exam.                                       CT Head Without Contrast (Final result)  Result time 10/17/24 19:56:06      Final result by Ricardo Rivera MD (10/17/24 19:56:06)                   Impression:      Large 5 cm intraparenchymal hemorrhage centered in the right frontal lobe.  Additional small amount of acute subdural hemorrhage seen along the right aspect of the interhemispheric falx.  Apparent small subarachnoid component of hemorrhage is also seen with small amount of layering blood also seen within the posterior horns of the lateral ventricles.    COMMUNICATION  This critical result was discovered/received on 10/17/2024 at 19:49.    The critical information above was relayed directly by Ricarod Rivera MD by telephone to Dr. Krystian Fitzpatrick on 10/17/2024 at 19:49.      Electronically signed by: Ricardo Rivera MD  Date:    10/17/2024  Time:    19:56               Narrative:    EXAMINATION:  CT HEAD WITHOUT CONTRAST    CLINICAL HISTORY:  Subdural hemorrhage;Head trauma, minor (Age >= 65y);    TECHNIQUE:  Low dose axial images were obtained through the head.  Coronal and sagittal reformations were also performed. Contrast was not administered.    COMPARISON:  Recent CT head from yesterday 10/16/2024.    FINDINGS:  New large acute intraparenchymal hemorrhage is centered in the right frontal lobe measuring approximately 5 x 5 cm in maximum transverse dimensions.  There is mild surrounding vasogenic edema.  Additional small amount of acute subdural hemorrhage is again visualized along the right aspect of the interhemispheric falx.  There is apparent small subarachnoid component of hemorrhage.  Localized mass effect is seen with approximately 7 mm leftward midline shift in the frontal region.  Mass effect is seen upon the anterior horn of the right lateral ventricle.  Small amount of acute hemorrhage is seen  layering in the posterior horns of the lateral ventricles without evidence of hydrocephalus, as ventricular system is otherwise stable in size.  No effacement of the skull-base cisterns.  Visualized paranasal sinuses and mastoid air cells are clear. The calvarium shows no significant abnormality.                                       CT Cervical Spine Without Contrast (Final result)  Result time 10/17/24 20:00:10      Final result by Ricardo Rivera MD (10/17/24 20:00:10)                   Impression:      No evidence of acute cervical spine fracture or dislocation.      Electronically signed by: Ricardo Rivera MD  Date:    10/17/2024  Time:    20:00               Narrative:    EXAMINATION:  CT CERVICAL SPINE WITHOUT CONTRAST    CLINICAL HISTORY:  Neck trauma (Age >= 65y);    TECHNIQUE:  Low dose axial images, sagittal and coronal reformations were performed though the cervical spine.  Contrast was not administered.    COMPARISON:  CT cervical spine from 10/16/2024.    FINDINGS:  No evidence of acute cervical spine fracture or dislocation.  Odontoid process is intact.  Craniocervical junction is unremarkable.  Cervical spine alignment is within normal limits.  Unchanged multilevel degenerative changes are seen.    Surrounding soft tissues show no significant abnormalities.  Airway is patent.  Partially visualized lung apices are clear.                                       Medications   niCARdipine 40 mg/200 mL (0.2 mg/mL) infusion (5 mg/hr Intravenous Restarted 10/17/24 2133)   phytonadione vitamin k (AQUA-MEPHYTON) 10 mg in D5W 50 mL IVPB (10 mg Intravenous New Bag 10/17/24 2053)   0.9%  NaCl infusion (for blood administration) (has no administration in time range)   sodium chloride 3% HYPERTONIC bolus (has no administration in time range)   acetaminophen suppository 975 mg (975 mg Rectal Given 10/17/24 2028)   insulin regular injection 6 Units 0.06 mL (6 Units Intravenous Given 10/17/24 2023)   Prothrombin  complex concentrate, human (Kcentra) 2,144 Units in sterile water for injection IVPB (2,144 Units Intravenous New Bag 10/17/24 2121)     Medical Decision Making  Differential diagnosis includes but not limited to:  Intracranial injury, expanding subdural hematoma, CVA, DKA, hyperosmolality, electrolyte abnormality    Patient arrives drowsy and intermittently cooperative but is answering questions and following commands.  He is significantly hyperglycemic.  I-STAT Chem 8 with normal potassium, VBG without acidosis.  CT brain notable for 5 cm intraparenchymal hemorrhage centered in the right frontal lobe.  This is new compared to CT scan from yesterday.  Patient started on Cardene for BP control.  Given insulin for glycemic control.  Patient noted to be febrile to 101.  No obvious infectious source.  Patient given Tylenol for fever.  Chest x-ray does not indicate pneumonia.  Urinalysis is not suggestive of UTI.  CBC without leukocytosis or bandemia.  COVID, flu are negative.  Beta hydroxybutyrate is negative.  Chemistry without anion gap.  Symptoms not appear consistent with DKA.  Review of chart demonstrates that patient's INR was 3.9 yesterday.  Patient started on vitamin K.    Consult: I have spoken with Dr. Johnson from the neuro critical care service regarding the patient's presentation and study results.  At this time, the recommendation is administration of Kcentra.  Given hyponatremia 132 recommends administration of 250 cc of 3% normal saline through largest IV patient has a available.    Patient to be transferred to [facility] for neuro critical care admission and further evaluation and management of intraparenchymal hemorrhage  Patient's daughter informed of findings and plan of care.  She expresses understanding and agreement        Amount and/or Complexity of Data Reviewed  Labs: ordered.  Radiology: ordered.    Risk  OTC drugs.  Prescription drug management.    Critical Care  Total time providing  critical care: 60 minutes                                      Clinical Impression:  Final diagnoses:  [R73.9] Hyperglycemia  [I62.9] Intracranial hemorrhage (Primary)          ED Disposition Condition    Transfer to Another Facility Stable                Krystian Fitzpatrick MD  10/18/24 0657

## 2024-10-18 NOTE — ASSESSMENT & PLAN NOTE
Hold off on BiPAP as patient is not alert enough to be able to remove mask/concern for aspiration risk   PRN ABG

## 2024-10-18 NOTE — ASSESSMENT & PLAN NOTE
LEANDRO on CKD, Cr 1.8 on initial presentation to ED and now 2.2    Avoid nephrotoxic agents  Renally dose medications  Q 1 hour I&Os  IVF

## 2024-10-18 NOTE — ASSESSMENT & PLAN NOTE
83 y.o. 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 Given K-centra at OSH:    Imaging:  [facility] OSH 10/17: 5cm R frontal ICH with 7mm MLS and some intraventricular blood in posterior lateral vent  CT [facility] OSH 10/17: no acute processes  rCTH 10/18: grossly stable    Plan:  Patient admitted to ICU on telemetry  q1h neurochecks in ICU, q2h neurochecks in stepdown, q4h neurochecks on floor  All labs and diagnostics reviewed  Follow-up CTH and 6h scan for stability  MRI/CTA for suspicious lesions  Reverse anti-plt/coag medications. Given K-centra at OSH. INR 1.2 on arrival  SBP <140 (Cardene gtt; Hydralazine & Labetalol PRN; Transition to home meds when appropriate)  Na >135  Keppra 500 BID  HOB >30  Follow-up pre-op labs (CBC/CMP/PT-INR/PTT/T&S)  NPO at this time for possible operative intervention  Continue to monitor clinically, notify NSGY immediately with any changes in neuro status    Plan discussed with Dr. Condon    Dispo: admitted to ICU

## 2024-10-18 NOTE — PLAN OF CARE
Discussed with daughter Cherie on phone that given the size and mass effect associated with the R frontal hemorrhage, surgical evacuation could be of benefit. Discussed the ENRICH trial supporting MIS clot evacuation however with the caveat that this bleed is a combination of IPH with traumatic contusion and some might not separate from the brain tissue itself.     Daughter stated that her father is someone who was functionally independent with most ADLs (ambulated with walker), conversant, had stated he wished he could have orthopedic surgeries done however was counseled against holding his blood thinners for those. She felt like he would wish to proceed with surgery if we thought surgery would be of benefit. Verbal consent obtained.     Patient currently very drowsy, oriented x2 but providing 1 to 2 word answers then closing eyes again. Attempted to inform him of surgery but not able to provide consent.    Will proceed to OR Class A R supraorbital crani for clot evacuation. Will obtain Critical access hospital CTH prior to OR.     MD Marshall [facility] PGY3

## 2024-10-18 NOTE — ANESTHESIA PREPROCEDURE EVALUATION
[facility]  Anesthesia Pre-Operative Evaluation         Patient Name: Percy Ramirez  YOB: 1940  MRN: [id number]    SUBJECTIVE:     Pre-operative evaluation for Procedure(s) (LRB):  CRANIOTOMY, FOR SUBDURAL HEMATOMA EVACUATION (Right)     10/18/2024    PAPER CONSENT IN CHART    Percy Ramirez is a 83 y.o. male w/ a significant PMHx of HTN, HLD, DM2, CAD s/p CABG x2, Afib on Coumadin, ILD on 4L of O2, HFrEF (45%), presenting as a transfer for a large traumatic R frontal IPH. Going as a takeback for subdural hematoma evacuation following his initial evacuation earlier today.    Patient now presents for the above procedure(s).    Results for orders placed during the hospital encounter of 10/17/24    Echo Saline Bubble? Yes    Interpretation Summary    Left Ventricle: The left ventricle is normal in size. Normal wall thickness. There is mildly reduced systolic function with a visually estimated ejection fraction of 45 - 50%. Unable to assess diastolic function due to atrial fibrillation.    Right Ventricle: Normal right ventricular cavity size. Wall thickness is normal. Systolic function is normal.    Biatrial enlargement    Aortic Valve: The aortic valve is a trileaflet valve. There is mild aortic valve sclerosis.    Pulmonary Artery: There is mild pulmonary hypertension. The estimated pulmonary artery systolic pressure is 41 mmHg.    IVC/SVC: Normal venous pressure at 3 mmHg.       LDA:   Percutaneous Central Line - Triple Lumen  10/18/24 1715 Internal Jugular Right (Active)   Number of days: 0            Peripheral IV - Single Lumen 10/17/24 1850 20 G Anterior;Distal;Left Upper Arm (Active)   Site Assessment Clean;Dry;Intact 10/18/24 0902   Extremity Assessment Distal to IV No abnormal discoloration;No redness;No swelling;No warmth 10/18/24 0902   Line Status Saline locked;Flushed 10/18/24 0902   Dressing Status Clean;Dry;Intact 10/18/24 0902   Dressing Intervention Integrity  "maintained 10/18/24 0902   Dressing Change Due 10/21/24 10/18/24 0902   Site Change Due 10/21/24 10/18/24 0902   Reason Not Rotated Not due 10/18/24 0902   Number of days: 0            Peripheral IV - Single Lumen 10/18/24 0159 20 G Anterior;Left Upper Arm (Active)   Site Assessment Clean;Intact;Dry 10/18/24 0902   Extremity Assessment Distal to IV No redness;No abnormal discoloration;No swelling;No warmth 10/18/24 0902   Line Status Flushed;Saline locked 10/18/24 0902   Dressing Status Clean;Dry;Intact 10/18/24 0902   Dressing Intervention Integrity maintained 10/18/24 0902   Dressing Change Due 10/22/24 10/18/24 0902   Site Change Due 10/22/24 10/18/24 0902   Reason Not Rotated Not due 10/18/24 0902   Number of days: 0            Peripheral IV - Single Lumen 10/18/24 1030 20 G Left Upper Arm (Active)   Number of days: 0            Peripheral IV - Single Lumen 10/17/24 1500 20 G Right Antecubital (Active)   Number of days: 1       Arterial Line 10/18/24 1400 Left Radial (Active)   Number of days: 0            Urethral Catheter 10/17/24 2012 Straight-tip 16 Fr. (Active)   Site Assessment Clean;Intact 10/18/24 0902   Collection Container Urimeter 10/18/24 0902   Securement Method secured to top of thigh w/ adhesive device 10/18/24 0902   Catheter Care Performed yes 10/18/24 0902   Reason for Continuing Urinary Catheterization Critically ill in ICU and requiring hourly monitoring of intake/output 10/18/24 0902   CAUTI Prevention Bundle Securement Device in place with 1" slack;Intact seal between catheter & drainage tubing;Drainage bag/urimeter off the floor;Sheeting clip in use;No dependent loops or kinks;Drainage bag/urimeter not overfilled (<2/3 full);Drainage bag/urimeter below bladder 10/18/24 0902   Output (mL) 60 mL 10/18/24 0902   Number of days: 0       Prev airway:    Induction:  Intravenous    Intubated:  Postinduction    Mask Ventilation:  Moderately difficult with oral airway    Attempts:  1    Attempted " By:  CRNA    Method of Intubation:  Video laryngoscopy    Blade:  Gomes 3    Laryngeal View Grade: Grade I - full view of cords      Difficult Airway Encountered?: No      Complications:  None    Airway Device:  Oral endotracheal tube    Airway Device Size:  7.5    Style/Cuff Inflation:  Cuffed (inflated to minimal occlusive pressure)    Tube secured:  23    Secured at:  The lips    Placement Verified By:  Capnometry    Complicating Factors:  Obesity    Findings Post-Intubation:  BS equal bilateral and atraumatic/condition of teeth unchanged      Drips:    D5 and 0.45% NaCl   Intravenous Continuous PRN        insulin regular 1 units/mL infusion orderable (DKA)  0-52 Units/hr Intravenous Continuous 2 mL/hr at 10/18/24 1539 2 Units/hr at 10/18/24 1539    nicardipine  0-15 mg/hr Intravenous Continuous 50 mL/hr at 10/18/24 1720 10 mg/hr at 10/18/24 1720       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    Coronary artery disease    S/P CABG x 2    Macular degeneration    Obstructive sleep apnea treated with BiPAP    Anxiety state, unspecified    Insulin long-term use    Primary osteoarthritis of both knees    Chronic low back pain    DJD (degenerative joint disease), lumbar    Poorly controlled type 2 diabetes mellitus with neuropathy    Bilateral carotid artery disease    Hyperlipidemia LDL goal <100    Type 2 diabetes, with peripheral circulatory disorder not at goal    Atherosclerosis of abdominal aorta    Overweight (BMI 25.0-29.9)    Persistent atrial fibrillation    Chronic stable angina    Senile purpura    Goals of care, counseling/discussion    Osteoarthritis of carpometacarpal (CMC) joint of left thumb    MCI (mild cognitive impairment)    Dizziness and giddiness    Pulmonary nodule    Dyspnea on exertion    Chronic combined systolic  and diastolic heart failure    Walker as ambulation aid    ILD (interstitial lung disease)    History of cardioversion    Erectile dysfunction    Secondary hyperparathyroidism of renal origin    Noncompliance with medication regimen    Exudative age-related macular degeneration, right eye, with active choroidal neovascularization    History of stroke    Chronic tension-type headache, intractable    Spinal stenosis of lumbar region with neurogenic claudication    Lumbar radiculopathy    Lumbar spondylosis    DDD (degenerative disc disease), lumbar    Cerebral atherosclerosis    Chronic kidney disease, stage 3a    Ear pressure, bilateral    Dysphagia    Advance care planning    Centrilobular emphysema    Traumatic subdural hematoma without loss of consciousness    Traumatic right-sided intracerebral hemorrhage without loss of consciousness    LEANDRO (acute kidney injury)       Review of patient's allergies indicates:   Allergen Reactions    Aricept odt [donepezil] Diarrhea and Nausea And Vomiting    Codeine Nausea Only     weak    Ranexa [ranolazine]        Current Inpatient Medications:   atorvastatin  40 mg Oral Daily    carvediloL  3.125 mg Oral BID WM    levETIRAcetam (Keppra) IV (PEDS and ADULTS)  500 mg Intravenous Q12H    LIDOcaine (PF) 10 mg/ml (1%)  5 mL Infiltration ED 1 Time    mupirocin   Nasal BID    polyethylene glycol  17 g Oral Daily    senna-docusate 8.6-50 mg  1 tablet Oral BID    tiotropium bromide  2 puff Inhalation Daily    valsartan  40 mg Oral Daily       No current facility-administered medications on file prior to encounter.     Current Outpatient Medications on File Prior to Encounter   Medication Sig Dispense Refill    acetaminophen (TYLENOL) 500 MG tablet Take 1 tablet (500 mg total) by mouth every 6 (six) hours as needed. 13 tablet 0    albuterol (PROVENTIL) 2.5 mg /3 mL (0.083 %) nebulizer solution Take 3 mLs (2.5 mg total) by nebulization every 6 to 8 hours as needed for Wheezing. 100 mL 3  "   albuterol (PROVENTIL/VENTOLIN HFA) 90 mcg/actuation inhaler INHALE 2 PUFFS BY MOUTH EVERY 6 HOURS AS NEEDED FOR WHEEZING OR  SHORTNESS  OF  BREATH 8 g 3    atorvastatin (LIPITOR) 40 MG tablet Take 1 tablet by mouth every morning.      BD INSULIN PEN NEEDLE UF SHORT 31 gauge x 5/16" Ndle       BD INSULIN SYRINGE ULTRA-FINE 1/2 mL 31 gauge x 15/64" Syrg       blood sugar diagnostic Strp To check BG 3 times daily, to use with insurance preferred meter 200 strip 11    carvediloL (COREG) 3.125 MG tablet Take 1 tablet (3.125 mg total) by mouth 2 (two) times daily with meals. 180 tablet 0    ceramides 1,3,6-II (CERAVE DAILY MOISTURIZING) Lotn Apply twice daily to skin 355 mL 1    cinnamon bark 500 mg capsule Take 1,000 mg by mouth once daily.        clopidogreL (PLAVIX) 75 mg tablet Take 75 mg by mouth.      diclofenac sodium (VOLTAREN) 1 % Gel Apply 2 g topically 3 (three) times daily. 50 g 0    DULoxetine (CYMBALTA) 60 MG capsule Take 1 capsule (60 mg total) by mouth once daily. 90 capsule 3    fluticasone propionate (FLONASE) 50 mcg/actuation nasal spray 1 spray (50 mcg total) by Each Nostril route 2 (two) times daily. 18.2 mL 3    furosemide (LASIX) 40 MG tablet Take 40 mg by mouth once daily.      gabapentin (NEURONTIN) 300 MG capsule Take 4 capsules (1,200 mg total) by mouth 2 (two) times daily. 540 capsule 1    glimepiride (AMARYL) 4 MG tablet Take 4 mg by mouth daily with breakfast.       HYDROcodone-acetaminophen (NORCO) 7.5-325 mg per tablet Take 1 tablet by mouth every 8 (eight) hours as needed for Pain. 90 tablet 0    [START ON 11/3/2024] HYDROcodone-acetaminophen (NORCO) 7.5-325 mg per tablet Take 1 tablet by mouth every 8 (eight) hours as needed for Pain. 90 tablet 0    insulin NPH-insulin regular, 70/30, (NOVOLIN 70/30) 100 unit/mL (70-30) injection Inject into the skin. Inject 10 units at breakfast and inject 10 units at night      insulin syringe-needle U-100 0.3 mL 31 gauge x 15/64" Syrg USE TO INJECT " "INSULIN THREE TIMES DAILY      insulin syringe-needle U-100 1/2 mL 31 x 5/16" Syrg       isosorbide mononitrate (IMDUR) 30 MG 24 hr tablet Take 30 mg by mouth once daily.      lancets Misc To check BG 3 times daily, to use with insurance preferred meter 200 each 11    levETIRAcetam (KEPPRA) 500 MG Tab Take 1 tablet (500 mg total) by mouth 2 (two) times daily. for 7 days 14 tablet 0    LIDOcaine (LIDODERM) 5 % Place 1 patch onto the skin once daily. Remove & Discard patch within 12 hours or as directed by MD Townsend patch 1    meclizine (ANTIVERT) 12.5 mg tablet Take 1 tablet (12.5 mg total) by mouth 3 (three) times daily as needed for Dizziness. 90 tablet 0    methocarbamoL (ROBAXIN) 500 MG Tab TAKE 1 TABLET BY MOUTH 4 TIMES DAILY FOR 10 DAYS 60 tablet 0    mupirocin (BACTROBAN) 2 % ointment Apply topically 3 (three) times daily. 1 g 1    nitroGLYCERIN (NITROSTAT) 0.4 MG SL tablet DISSOLVE 1 TABLET UNDER THE TONGUE EVERY 5 MINUTES AS  NEEDED FOR CHEST PAIN. MAX  OF 3 TABLETS IN 15 MINUTES. CALL 911 IF PAIN PERSISTS.      omeprazole (PRILOSEC) 20 MG capsule Take 1 capsule (20 mg total) by mouth once daily. 30 capsule 2    pravastatin (PRAVACHOL) 20 MG tablet Take 1 tablet (20 mg total) by mouth once daily. 90 tablet 1    spironolactone (ALDACTONE) 25 MG tablet Take 0.5 tablets (12.5 mg total) by mouth once daily.      tiotropium-olodateroL (STIOLTO RESPIMAT) 2.5-2.5 mcg/actuation Mist Inhale 2 puffs into the lungs once daily. Controller 1 g 11    triamcinolone acetonide 0.1% (KENALOG) 0.1 % cream SMARTSI Application Topical 2-3 Times Daily      valsartan (DIOVAN) 40 MG tablet Take 1 tablet (40 mg total) by mouth once daily. 90 tablet 0    VIT C/VIT E AC/LUT/COPPER/ZINC (PRESERVISION LUTEIN ORAL) Take by mouth.      warfarin (COUMADIN) 5 MG tablet Take 2 tabs by mouth on Tuesday,Thursday, Saturday and Sundays then 1.5 on all other days.         Past Surgical History:   Procedure Laterality Date    broken elbow      left "    COLONOSCOPY N/A 10/4/2017    Procedure: COLONOSCOPY;  Surgeon: Gabriel Leung MD;  Location: [facility];  Service: Endoscopy;  Laterality: N/A;    EYE SURGERY      cataract    heart bypass      2004    HERNIA REPAIR      right    ROTATOR CUFF REPAIR      right  2004       Social History     Socioeconomic History    Marital status:     Number of children: 4    Years of education: GED   Occupational History    Occupation: retired tug    Tobacco Use    Smoking status: Former     Current packs/day: 0.00     Average packs/day: 3.0 packs/day for 45.0 years (135.0 ttl pk-yrs)     Types: Cigarettes     Start date: 1959     Quit date: 2004     Years since quittin.5    Smokeless tobacco: Former   Substance and Sexual Activity    Alcohol use: Yes     Comment: was heavy drinker 35 years ago, rare use now    Drug use: No    Sexual activity: Yes     Partners: Female     Social Drivers of Health     Financial Resource Strain: Patient Unable To Answer (10/18/2024)    Overall Financial Resource Strain (CARDIA)     Difficulty of Paying Living Expenses: Patient unable to answer   Food Insecurity: Patient Unable To Answer (10/18/2024)    Hunger Vital Sign     Worried About Running Out of Food in the Last Year: Patient unable to answer     Ran Out of Food in the Last Year: Patient unable to answer   Transportation Needs: No Transportation Needs (10/18/2024)    TRANSPORTATION NEEDS     Transportation : No   Physical Activity: Inactive (10/18/2024)    Exercise Vital Sign     Days of Exercise per Week: 0 days     Minutes of Exercise per Session: 0 min   Stress: No Stress Concern Present (2024)    [employer] - Occupational Stress Questionnaire     Feeling of Stress : Not at all   Housing Stability: Low Risk  (10/18/2024)    Housing Stability Vital Sign     Unable to Pay for Housing in the Last Year: No     Homeless in the Last Year: No       OBJECTIVE:     Vital Signs  Range (Last 24H):  Temp:  [36.4 °C (97.6 °F)-38.3 °C (101 °F)]   Pulse:  [79-97]   Resp:  [14-26]   BP: (109-197)/()   SpO2:  [92 %-100 %]       Significant Labs:  Lab Results   Component Value Date    WBC 11.72 10/18/2024    HGB 11.6 (L) 10/18/2024    HCT 34.5 (L) 10/18/2024     10/18/2024    CHOL 108 (L) 10/17/2024    TRIG 116 10/17/2024    HDL 22 (L) 10/17/2024    ALT 12 10/18/2024    AST 14 10/18/2024     10/18/2024    K 5.2 (H) 10/18/2024     10/18/2024    CREATININE 1.6 (H) 10/18/2024    BUN 26 (H) 10/18/2024    CO2 21 (L) 10/18/2024    TSH 0.536 10/17/2024    INR 1.1 10/18/2024    HGBA1C 9.9 (H) 10/17/2024    MICROALBUR 1.0 01/24/2018       Diagnostic Studies: No relevant studies.    EKG:   Results for orders placed or performed during the hospital encounter of 10/16/24   EKG 12-lead    Collection Time: 10/16/24 12:15 PM   Result Value Ref Range    QRS Duration 106 ms    [facility] QTC Calculation 406 ms    Narrative    Test Reason : R42,    Vent. Rate : 074 BPM     Atrial Rate : 084 BPM     P-R Int : 000 ms          QRS Dur : 106 ms      QT Int : 366 ms       P-R-T Axes : 000 -35 163 degrees     QTc Int : 406 ms    Atrial fibrillation  Left axis deviation  Septal infarct (cited on or before 20-JUN-2024)  Abnormal ECG  When compared with ECG of 20-JUN-2024 07:45,  No significant change was found  Confirmed by Saskia BRODERICK, Marina KING ([id number]) on 10/17/2024 10:23:25 PM    Referred By: [email or web address]   SELF           Confirmed By:Marina Kruger MD       2D ECHO:  TTE:  Results for orders placed or performed during the hospital encounter of 10/17/24   Echo Saline Bubble? Yes   Result Value Ref Range    LV Diastolic Volume 132.35 mL    Echo EF Estimated 46 %    LV Systolic Volume 71.92 mL    IVS 0.9 0.6 - 1.1 cm    LVIDd 5.3 3.5 - 6.0 cm    LVIDs 4.1 (A) 2.1 - 4.0 cm    LVOT diameter 2.2 cm    PW 1.0 0.6 - 1.1 cm    AV LVOT peak gradient 2 mmHg    LVOT mn grad 1 mmHg    LVOT peak milan 0.7 m/s    LVOT peak VTI  12.4 cm    RV- webb basal diam 3.2 cm    LA size 4.27 cm    Left Atrium Major Axis 6.90 cm    Left Atrium Minor Axis 6.30 cm    RA Major Axis 6.69 cm    AV valve area 2.6 cm2    AV area by cont VTI 2.7 cm2    AV peak gradient 4.8 mmHg    AV mean gradient 3.1 mmHg    Ao peak rodriguez 1.1 m/s    Ao VTI 17.8 cm    E wave deceleration time 208.54 ms    E wave deceleration time 203.91 ms    MV Peak E Rodriguez 1.07 m/s    TDI LATERAL 0.12 m/s    TDI SEPTAL 0.06 m/s    TV peak gradient 41 mmHg    TR Max Rodriguez 3.1 m/s    Ascending aorta 3.52 cm    STJ 2.97 cm    Sinus 3.85 cm    LA WIDTH 4.01 cm    RA Width 4.06 cm    BSA 2 m2    LVOT stroke volume 47.1 cm3    LV Systolic Volume Index 36.5 mL/m2    LV Diastolic Volume Index 67.18 mL/m2    LVOT area 3.8 cm2    FS 22.6 (A) 28 - 44 %    Left Ventricle Relative Wall Thickness 0.38 cm    LV mass 187.3 g    LV Mass Index 95.1 g/m2    ERROL 48.7 mL/m2    LA Vol 95.86 cm3    RV/LV Ratio 0.60 cm    AV Velocity Ratio 0.64     AV index (prosthetic) 0.70     KASH by Velocity Ratio 2.4 cm²    Triscuspid Valve Regurgitation Peak Gradient 38 mmHg    Mean e' 0.09 m/s    ZLVIDS 1.30     ZLVIDD -0.66     TV resting pulmonary artery pressure 41 mmHg    RV TB RVSP 6 mmHg    Est. RA pres 3 mmHg    E/E' ratio 11.89 m/s    LV SEPTAL E/E' RATIO 17.83 m/s    LV LATERAL E/E' RATIO 8.92 m/s    Narrative      Left Ventricle: The left ventricle is normal in size. Normal wall   thickness. There is mildly reduced systolic function with a visually   estimated ejection fraction of 45 - 50%. Unable to assess diastolic   function due to atrial fibrillation.    Right Ventricle: Normal right ventricular cavity size. Wall thickness   is normal. Systolic function is normal.    Biatrial enlargement    Aortic Valve: The aortic valve is a trileaflet valve. There is mild   aortic valve sclerosis.    Pulmonary Artery: There is mild pulmonary hypertension. The estimated   pulmonary artery systolic pressure is 41 mmHg.    IVC/SVC:  Normal venous pressure at 3 mmHg.         SHANDRA:  No results found. However, due to the size of the patient record, not all encounters were searched. Please check Results Review for a complete set of results.    ASSESSMENT/PLAN:           Pre-op Assessment    I have reviewed the Patient Summary Reports.     I have reviewed the Nursing Notes. I have reviewed the NPO Status.   I have reviewed the Medications.     Review of Systems  Anesthesia Hx:  No problems with previous Anesthesia   History of prior surgery of interest to airway management or planning:          Denies Family Hx of Anesthesia complications.    Denies Personal Hx of Anesthesia complications.                    Social:  Non-Smoker, No Alcohol Use       Hematology/Oncology:       -- Denies Anemia:                                  Cardiovascular:     Hypertension   CAD      Angina  Denies CHF.            Cardiovascular Symptoms: Angina   Shortness of Breath    Coronary Artery Disease:                            Hypertension     Atrial Fibrillation     Pulmonary:   COPD  Denies Asthma.  Shortness of breath  Sleep Apnea    Chronic Obstructive Pulmonary Disease (COPD):           Obstructive Sleep Apnea (ELINA).           Renal/:  Chronic Renal Disease        Kidney Function/Disease             Hepatic/GI:      Denies GERD.                Musculoskeletal:  Arthritis        Arthritis          Neurological:    Denies CVA. Neuromuscular Disease,  Headaches Denies Seizures.     Dx of Headaches   Arthritis                         Neuromuscular Disease   Endocrine:  Diabetes    Diabetes                      Psych:  Psychiatric History                  Physical Exam  General: Well nourished and Somnolent    Airway:  Mallampati: III / II  Mouth Opening: Normal  TM Distance: Normal  Tongue: Normal  Neck ROM: Normal ROM    Dental:  Dentures        Anesthesia Plan  Type of Anesthesia, risks & benefits discussed:    Anesthesia Type: Gen ETT  Intra-op Monitoring Plan:  Standard ASA Monitors  Post Op Pain Control Plan: multimodal analgesia and IV/PO Opioids PRN  Induction:  IV  Airway Plan: Direct and Video, Post-Induction  Informed Consent: Informed consent signed with the Patient representative and all parties understand the risks and agree with anesthesia plan.  All questions answered.   ASA Score: 4  Day of Surgery Review of History & Physical: H&P Update referred to the surgeon/provider.    Ready For Surgery From Anesthesia Perspective.     .

## 2024-10-18 NOTE — ANESTHESIA PREPROCEDURE EVALUATION
[facility]  Anesthesia Pre-Operative Evaluation         Patient Name: Percy Ramirez  YOB: 1940  MRN: [id number]    SUBJECTIVE:     Pre-operative evaluation for Procedure(s) (LRB):  CRANIOTOMY, Right supraorbital FOR HEMATOMA EVACUATION (Right)     10/18/2024    Percy Ramirez is a 83 y.o. male w/ a significant PMHx of 83M PMHx of HTN, HLD, DM2, CAD s/p CABG x2, Afib on Coumadin, ILD on 4L of O2, HFrEF (45%), presenting as a transfer for a large traumatic R frontal IPH. Going for Class A craniotomy with neurosurgery today.    Patient now presents for the above procedure(s).    Results for orders placed during the hospital encounter of 10/17/24    Echo Saline Bubble? Yes    Interpretation Summary    Left Ventricle: The left ventricle is normal in size. Normal wall thickness. There is mildly reduced systolic function with a visually estimated ejection fraction of 45 - 50%. Unable to assess diastolic function due to atrial fibrillation.    Right Ventricle: Normal right ventricular cavity size. Wall thickness is normal. Systolic function is normal.    Biatrial enlargement    Aortic Valve: The aortic valve is a trileaflet valve. There is mild aortic valve sclerosis.    Pulmonary Artery: There is mild pulmonary hypertension. The estimated pulmonary artery systolic pressure is 41 mmHg.    IVC/SVC: Normal venous pressure at 3 mmHg.       LDA:        Peripheral IV - Single Lumen 10/17/24 1850 20 G Anterior;Distal;Left Upper Arm (Active)   Site Assessment Clean;Dry;Intact 10/18/24 0702   Extremity Assessment Distal to IV No abnormal discoloration;No redness;No swelling;No warmth 10/18/24 0702   Line Status Saline locked;Flushed 10/18/24 0702   Dressing Status Clean;Dry;Intact 10/18/24 0702   Dressing Intervention Integrity maintained 10/18/24 0702   Dressing Change Due 10/21/24 10/18/24 0702   Site Change Due 10/21/24 10/18/24 0702   Reason Not Rotated Not due 10/18/24 0702   Number of days:  0            Peripheral IV - Single Lumen 10/17/24 2111 22 G Anterior;Left Wrist (Active)   Site Assessment Clean;Dry;Intact 10/18/24 0702   Extremity Assessment Distal to IV No abnormal discoloration;No redness;No swelling;No warmth 10/18/24 0702   Line Status Flushed;Saline locked 10/18/24 0702   Dressing Status Clean;Dry;Intact 10/18/24 0702   Dressing Intervention Integrity maintained 10/18/24 0702   Dressing Change Due 10/21/24 10/18/24 0702   Site Change Due 10/21/24 10/18/24 0702   Reason Not Rotated Not due 10/18/24 0702   Number of days: 0            Peripheral IV - Single Lumen 10/17/24 2135 18 G Right Antecubital (Active)   Site Assessment Clean;Dry;Intact 10/18/24 0702   Extremity Assessment Distal to IV No abnormal discoloration;No redness;No swelling;No warmth 10/18/24 0702   Line Status Flushed;Saline locked 10/18/24 0702   Dressing Status Clean;Dry;Intact 10/18/24 0702   Dressing Intervention Integrity maintained 10/18/24 0702   Dressing Change Due 10/21/24 10/18/24 0702   Site Change Due 10/21/24 10/18/24 0702   Reason Not Rotated Not due 10/18/24 0702   Number of days: 0            Peripheral IV - Single Lumen 10/17/24 2141 18 G 1 3/4 in Anterior;Right Upper Arm (Active)   Site Assessment Clean;Dry;Intact 10/18/24 0702   Extremity Assessment Distal to IV No abnormal discoloration;No redness;No swelling;No warmth 10/18/24 0702   Line Status Flushed;Saline locked 10/18/24 0702   Dressing Status Clean;Dry;Intact 10/18/24 0702   Dressing Intervention Integrity maintained 10/18/24 0702   Dressing Change Due 10/21/24 10/18/24 0702   Site Change Due 10/21/24 10/18/24 0702   Reason Not Rotated Not due 10/18/24 0702   Number of days: 0            Peripheral IV - Single Lumen 10/18/24 0159 20 G Anterior;Left Upper Arm (Active)   Site Assessment Clean;Intact;Dry 10/18/24 0702   Extremity Assessment Distal to IV No redness;No abnormal discoloration;No swelling;No warmth 10/18/24 0702   Line Status  "Flushed;Saline locked 10/18/24 0702   Dressing Status Clean;Dry;Intact 10/18/24 0702   Dressing Intervention Integrity maintained 10/18/24 0702   Dressing Change Due 10/22/24 10/18/24 0702   Site Change Due 10/22/24 10/18/24 0702   Reason Not Rotated Not due 10/18/24 0702   Number of days: 0            Urethral Catheter 10/17/24 2012 Straight-tip 16 Fr. (Active)   Site Assessment Clean;Intact 10/18/24 0702   Collection Container Urimeter 10/18/24 0702   Securement Method secured to top of thigh w/ adhesive device 10/18/24 0702   Catheter Care Performed yes 10/18/24 0702   Reason for Continuing Urinary Catheterization Critically ill in ICU and requiring hourly monitoring of intake/output 10/18/24 0702   CAUTI Prevention Bundle Securement Device in place with 1" slack;Intact seal between catheter & drainage tubing;Sheeting clip in use;Drainage bag/urimeter off the floor;No dependent loops or kinks;Drainage bag/urimeter not overfilled (<2/3 full);Drainage bag/urimeter below bladder 10/18/24 0702   Output (mL) 60 mL 10/18/24 0702   Number of days: 0       Prev airway: None documented.    Drips:   D5 and 0.45% NaCl   Intravenous Continuous PRN        insulin regular 1 units/mL infusion orderable (DKA)  0-52 Units/hr Intravenous Continuous 4.5 mL/hr at 10/18/24 0605 4.5 Units/hr at 10/18/24 0605       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    Coronary artery disease    S/P CABG x 2    Macular degeneration    Obstructive sleep apnea treated with BiPAP    Anxiety state, unspecified    Insulin long-term use    Primary osteoarthritis of both knees    Chronic low back pain    DJD (degenerative joint disease), lumbar    Poorly controlled type 2 diabetes mellitus with neuropathy    Bilateral carotid artery disease    Hyperlipidemia LDL goal <100    " Type 2 diabetes, with peripheral circulatory disorder not at goal    Atherosclerosis of abdominal aorta    Overweight (BMI 25.0-29.9)    Persistent atrial fibrillation    Chronic stable angina    Senile purpura    Goals of care, counseling/discussion    Osteoarthritis of carpometacarpal (CMC) joint of left thumb    MCI (mild cognitive impairment)    Dizziness and giddiness    Pulmonary nodule    Dyspnea on exertion    Chronic combined systolic and diastolic heart failure    Walker as ambulation aid    ILD (interstitial lung disease)    History of cardioversion    Erectile dysfunction    Secondary hyperparathyroidism of renal origin    Noncompliance with medication regimen    Exudative age-related macular degeneration, right eye, with active choroidal neovascularization    History of stroke    Chronic tension-type headache, intractable    Spinal stenosis of lumbar region with neurogenic claudication    Lumbar radiculopathy    Lumbar spondylosis    DDD (degenerative disc disease), lumbar    Cerebral atherosclerosis    Chronic kidney disease, stage 3a    Ear pressure, bilateral    Dysphagia    Advance care planning    Centrilobular emphysema    Traumatic subdural hematoma without loss of consciousness    Traumatic right-sided intracerebral hemorrhage without loss of consciousness    LEANDRO (acute kidney injury)       Review of patient's allergies indicates:   Allergen Reactions    Aricept odt [donepezil] Diarrhea and Nausea And Vomiting    Codeine Nausea Only     weak    Ranexa [ranolazine]        Current Inpatient Medications:   atorvastatin  40 mg Oral Daily    carvediloL  3.125 mg Oral BID WM    levETIRAcetam (Keppra) IV (PEDS and ADULTS)  500 mg Intravenous Q12H    mupirocin   Nasal BID    [COMPLETED] perflutren lipid microspheres  1.5 mL Intravenous Once    polyethylene glycol  17 g Oral Daily    senna-docusate 8.6-50 mg  1 tablet Oral BID    tiotropium bromide  2 puff Inhalation Daily    valsartan  40 mg Oral Daily  "      No current facility-administered medications on file prior to encounter.     Current Outpatient Medications on File Prior to Encounter   Medication Sig Dispense Refill    acetaminophen (TYLENOL) 500 MG tablet Take 1 tablet (500 mg total) by mouth every 6 (six) hours as needed. 13 tablet 0    albuterol (PROVENTIL) 2.5 mg /3 mL (0.083 %) nebulizer solution Take 3 mLs (2.5 mg total) by nebulization every 6 to 8 hours as needed for Wheezing. 100 mL 3    albuterol (PROVENTIL/VENTOLIN HFA) 90 mcg/actuation inhaler INHALE 2 PUFFS BY MOUTH EVERY 6 HOURS AS NEEDED FOR WHEEZING OR  SHORTNESS  OF  BREATH 8 g 3    atorvastatin (LIPITOR) 40 MG tablet Take 1 tablet by mouth every morning.      BD INSULIN PEN NEEDLE UF SHORT 31 gauge x 5/16" Ndle       BD INSULIN SYRINGE ULTRA-FINE 1/2 mL 31 gauge x 15/64" Syrg       blood sugar diagnostic Strp To check BG 3 times daily, to use with insurance preferred meter 200 strip 11    carvediloL (COREG) 3.125 MG tablet Take 1 tablet (3.125 mg total) by mouth 2 (two) times daily with meals. 180 tablet 0    ceramides 1,3,6-II (CERAVE DAILY MOISTURIZING) Lotn Apply twice daily to skin 355 mL 1    cinnamon bark 500 mg capsule Take 1,000 mg by mouth once daily.        clopidogreL (PLAVIX) 75 mg tablet Take 75 mg by mouth.      diclofenac sodium (VOLTAREN) 1 % Gel Apply 2 g topically 3 (three) times daily. 50 g 0    DULoxetine (CYMBALTA) 60 MG capsule Take 1 capsule (60 mg total) by mouth once daily. 90 capsule 3    fluticasone propionate (FLONASE) 50 mcg/actuation nasal spray 1 spray (50 mcg total) by Each Nostril route 2 (two) times daily. 18.2 mL 3    furosemide (LASIX) 40 MG tablet Take 40 mg by mouth once daily.      gabapentin (NEURONTIN) 300 MG capsule Take 4 capsules (1,200 mg total) by mouth 2 (two) times daily. 540 capsule 1    glimepiride (AMARYL) 4 MG tablet Take 4 mg by mouth daily with breakfast.       HYDROcodone-acetaminophen (NORCO) 7.5-325 mg per tablet Take 1 tablet by " "mouth every 8 (eight) hours as needed for Pain. 90 tablet 0    [START ON 11/3/2024] HYDROcodone-acetaminophen (NORCO) 7.5-325 mg per tablet Take 1 tablet by mouth every 8 (eight) hours as needed for Pain. 90 tablet 0    insulin NPH-insulin regular, 70/30, (NOVOLIN 70/30) 100 unit/mL (70-30) injection Inject into the skin. Inject 10 units at breakfast and inject 10 units at night      insulin syringe-needle U-100 0.3 mL 31 gauge x 15/64" Syrg USE TO INJECT INSULIN THREE TIMES DAILY      insulin syringe-needle U-100 1/2 mL 31 x 5/16" Syrg       isosorbide mononitrate (IMDUR) 30 MG 24 hr tablet Take 30 mg by mouth once daily.      lancets Misc To check BG 3 times daily, to use with insurance preferred meter 200 each 11    levETIRAcetam (KEPPRA) 500 MG Tab Take 1 tablet (500 mg total) by mouth 2 (two) times daily. for 7 days 14 tablet 0    LIDOcaine (LIDODERM) 5 % Place 1 patch onto the skin once daily. Remove & Discard patch within 12 hours or as directed by MD 5 patch 1    meclizine (ANTIVERT) 12.5 mg tablet Take 1 tablet (12.5 mg total) by mouth 3 (three) times daily as needed for Dizziness. 90 tablet 0    methocarbamoL (ROBAXIN) 500 MG Tab TAKE 1 TABLET BY MOUTH 4 TIMES DAILY FOR 10 DAYS 60 tablet 0    mupirocin (BACTROBAN) 2 % ointment Apply topically 3 (three) times daily. 1 g 1    nitroGLYCERIN (NITROSTAT) 0.4 MG SL tablet DISSOLVE 1 TABLET UNDER THE TONGUE EVERY 5 MINUTES AS  NEEDED FOR CHEST PAIN. MAX  OF 3 TABLETS IN 15 MINUTES. CALL 911 IF PAIN PERSISTS.      omeprazole (PRILOSEC) 20 MG capsule Take 1 capsule (20 mg total) by mouth once daily. 30 capsule 2    pravastatin (PRAVACHOL) 20 MG tablet Take 1 tablet (20 mg total) by mouth once daily. 90 tablet 1    spironolactone (ALDACTONE) 25 MG tablet Take 0.5 tablets (12.5 mg total) by mouth once daily.      tiotropium-olodateroL (STIOLTO RESPIMAT) 2.5-2.5 mcg/actuation Mist Inhale 2 puffs into the lungs once daily. Controller 1 g 11    triamcinolone acetonide " 0.1% (KENALOG) 0.1 % cream SMARTSI Application Topical 2-3 Times Daily      valsartan (DIOVAN) 40 MG tablet Take 1 tablet (40 mg total) by mouth once daily. 90 tablet 0    VIT C/VIT E AC/LUT/COPPER/ZINC (PRESERVISION LUTEIN ORAL) Take by mouth.      warfarin (COUMADIN) 5 MG tablet Take 2 tabs by mouth on Tuesday,Thursday, Saturday and Sundays then 1.5 on all other days.         Past Surgical History:   Procedure Laterality Date    broken elbow      left    COLONOSCOPY N/A 10/4/2017    Procedure: COLONOSCOPY;  Surgeon: Gabriel Leung MD;  Location: [facility];  Service: Endoscopy;  Laterality: N/A;    EYE SURGERY      cataract    heart bypass      2004    HERNIA REPAIR      right    ROTATOR CUFF REPAIR      right  2004       Social History     Socioeconomic History    Marital status:     Number of children: 4    Years of education: GED   Occupational History    Occupation: retired tug    Tobacco Use    Smoking status: Former     Current packs/day: 0.00     Average packs/day: 3.0 packs/day for 45.0 years (135.0 ttl pk-yrs)     Types: Cigarettes     Start date: 1959     Quit date: 2004     Years since quittin.5    Smokeless tobacco: Former   Substance and Sexual Activity    Alcohol use: Yes     Comment: was heavy drinker 35 years ago, rare use now    Drug use: No    Sexual activity: Yes     Partners: Female     Social Drivers of Health     Financial Resource Strain: Low Risk  (2024)    Overall Financial Resource Strain (CARDIA)     Difficulty of Paying Living Expenses: Not hard at all   Food Insecurity: No Food Insecurity (2024)    Hunger Vital Sign     Worried About Running Out of Food in the Last Year: Never true     Ran Out of Food in the Last Year: Never true   Transportation Needs: No Transportation Needs (2024)    PRAPARE - Transportation     Lack of Transportation (Medical): No     Lack of Transportation (Non-Medical): No   Physical Activity: Inactive  (6/20/2024)    Exercise Vital Sign     Days of Exercise per Week: 0 days     Minutes of Exercise per Session: 0 min   Stress: No Stress Concern Present (6/20/2024)    [employer] [place] [employer] - Occupational Stress Questionnaire     Feeling of Stress : Not at all   Housing Stability: Low Risk  (6/20/2024)    Housing Stability Vital Sign     Unable to Pay for Housing in the Last Year: No     Homeless in the Last Year: No       OBJECTIVE:     Vital Signs Range (Last 24H):  Temp:  [36.4 °C (97.6 °F)-38.3 °C (101 °F)]   Pulse:  [79-97]   Resp:  [14-26]   BP: (112-197)/()   SpO2:  [92 %-100 %]       Significant Labs:  Lab Results   Component Value Date    WBC 11.72 10/18/2024    HGB 11.6 (L) 10/18/2024    HCT 34.5 (L) 10/18/2024     10/18/2024    CHOL 108 (L) 10/17/2024    TRIG 116 10/17/2024    HDL 22 (L) 10/17/2024    ALT 12 10/18/2024    AST 14 10/18/2024     10/18/2024    K 4.1 10/18/2024     10/18/2024    CREATININE 1.4 10/18/2024    BUN 26 (H) 10/18/2024    CO2 23 10/18/2024    TSH 0.536 10/17/2024    INR 1.2 10/17/2024    HGBA1C 9.9 (H) 10/17/2024    MICROALBUR 1.0 01/24/2018       Diagnostic Studies: No relevant studies.    EKG:   Results for orders placed or performed during the hospital encounter of 10/16/24   EKG 12-lead    Collection Time: 10/16/24 12:15 PM   Result Value Ref Range    QRS Duration 106 ms    [facility] QTC Calculation 406 ms    Narrative    Test Reason : R42,    Vent. Rate : 074 BPM     Atrial Rate : 084 BPM     P-R Int : 000 ms          QRS Dur : 106 ms      QT Int : 366 ms       P-R-T Axes : 000 -35 163 degrees     QTc Int : 406 ms    Atrial fibrillation  Left axis deviation  Septal infarct (cited on or before 20-JUN-2024)  Abnormal ECG  When compared with ECG of 20-JUN-2024 07:45,  No significant change was found  Confirmed by Saskia BRODERICK, Marina KING ([id number]) on 10/17/2024 10:23:25 PM    Referred By: JOSE   SELF           Confirmed By:Marina Kruger MD       2D  ECHO:  TTE:  Results for orders placed or performed during the hospital encounter of 10/17/24   Echo Saline Bubble? Yes   Result Value Ref Range    LV Diastolic Volume 132.35 mL    Echo EF Estimated 46 %    LV Systolic Volume 71.92 mL    IVS 0.9 0.6 - 1.1 cm    LVIDd 5.3 3.5 - 6.0 cm    LVIDs 4.1 (A) 2.1 - 4.0 cm    LVOT diameter 2.2 cm    PW 1.0 0.6 - 1.1 cm    AV LVOT peak gradient 2 mmHg    LVOT mn grad 1 mmHg    LVOT peak rodriguez 0.7 m/s    LVOT peak VTI 12.4 cm    RV- webb basal diam 3.2 cm    LA size 4.27 cm    Left Atrium Major Axis 6.90 cm    Left Atrium Minor Axis 6.30 cm    RA Major Axis 6.69 cm    AV valve area 2.6 cm2    AV area by cont VTI 2.7 cm2    AV peak gradient 4.8 mmHg    AV mean gradient 3.1 mmHg    Ao peak rordiguez 1.1 m/s    Ao VTI 17.8 cm    E wave deceleration time 208.54 ms    E wave deceleration time 203.91 ms    MV Peak E Rodriguez 1.07 m/s    TDI LATERAL 0.12 m/s    TDI SEPTAL 0.06 m/s    TV peak gradient 41 mmHg    TR Max Rodriguez 3.1 m/s    Ascending aorta 3.52 cm    STJ 2.97 cm    Sinus 3.85 cm    LA WIDTH 4.01 cm    RA Width 4.06 cm    BSA 2 m2    LVOT stroke volume 47.1 cm3    LV Systolic Volume Index 36.5 mL/m2    LV Diastolic Volume Index 67.18 mL/m2    LVOT area 3.8 cm2    FS 22.6 (A) 28 - 44 %    Left Ventricle Relative Wall Thickness 0.38 cm    LV mass 187.3 g    LV Mass Index 95.1 g/m2    ERROL 48.7 mL/m2    LA Vol 95.86 cm3    RV/LV Ratio 0.60 cm    AV Velocity Ratio 0.64     AV index (prosthetic) 0.70     KASH by Velocity Ratio 2.4 cm²    Triscuspid Valve Regurgitation Peak Gradient 38 mmHg    Mean e' 0.09 m/s    ZLVIDS 1.30     ZLVIDD -0.66     TV resting pulmonary artery pressure 41 mmHg    RV TB RVSP 6 mmHg    Est. RA pres 3 mmHg    E/E' ratio 11.89 m/s    LV SEPTAL E/E' RATIO 17.83 m/s    LV LATERAL E/E' RATIO 8.92 m/s    Narrative      Left Ventricle: The left ventricle is normal in size. Normal wall   thickness. There is mildly reduced systolic function with a visually   estimated ejection  fraction of 45 - 50%. Unable to assess diastolic   function due to atrial fibrillation.    Right Ventricle: Normal right ventricular cavity size. Wall thickness   is normal. Systolic function is normal.    Biatrial enlargement    Aortic Valve: The aortic valve is a trileaflet valve. There is mild   aortic valve sclerosis.    Pulmonary Artery: There is mild pulmonary hypertension. The estimated   pulmonary artery systolic pressure is 41 mmHg.    IVC/SVC: Normal venous pressure at 3 mmHg.         SHANDRA:  No results found. However, due to the size of the patient record, not all encounters were searched. Please check Results Review for a complete set of results.    ASSESSMENT/PLAN:           Pre-op Assessment    I have reviewed the Patient Summary Reports.     I have reviewed the Nursing Notes. I have reviewed the NPO Status.   I have reviewed the Medications.     Review of Systems  Anesthesia Hx:  No problems with previous Anesthesia   History of prior surgery of interest to airway management or planning:          Denies Family Hx of Anesthesia complications.    Denies Personal Hx of Anesthesia complications.                    Social:  Non-Smoker, No Alcohol Use       Hematology/Oncology:       -- Denies Anemia:                                  Cardiovascular:     Hypertension   CAD      Angina  Denies CHF.            Cardiovascular Symptoms: Angina   Shortness of Breath    Coronary Artery Disease:                            Hypertension     Atrial Fibrillation     Pulmonary:   COPD  Denies Asthma.  Shortness of breath  Sleep Apnea    Chronic Obstructive Pulmonary Disease (COPD):           Obstructive Sleep Apnea (ELINA).           Renal/:  Chronic Renal Disease        Kidney Function/Disease             Hepatic/GI:      Denies GERD.                Musculoskeletal:  Arthritis        Arthritis          Neurological:    Denies CVA. Neuromuscular Disease,  Headaches Denies Seizures.     Dx of Headaches   Arthritis                          Neuromuscular Disease   Endocrine:  Diabetes    Diabetes                      Psych:  Psychiatric History                  Physical Exam  General: Well nourished and Somnolent    Airway:  Mallampati: III / II  Mouth Opening: Normal  TM Distance: Normal  Tongue: Normal  Neck ROM: Normal ROM    Dental:  Dentures        Anesthesia Plan  Type of Anesthesia, risks & benefits discussed:    Anesthesia Type: Gen ETT  Intra-op Monitoring Plan: Standard ASA Monitors and Art Line  Post Op Pain Control Plan: multimodal analgesia and IV/PO Opioids PRN  Induction:  IV  Airway Plan: Direct and Video, Post-Induction  Informed Consent: Informed consent signed with the Patient and all parties understand the risks and agree with anesthesia plan.  All questions answered.   ASA Score: 4 Emergent  Day of Surgery Review of History & Physical: H&P Update referred to the surgeon/provider.    Ready For Surgery From Anesthesia Perspective.     .

## 2024-10-18 NOTE — PLAN OF CARE
Recommendations    Advance to diabetic diet, texture per SLP.   RD to monitor and follow up.    Goals: Meet % EEN/EPN by RD follow up.  Nutrition Goal Status: new  Communication of RD Recs: other (comment) (POC)

## 2024-10-18 NOTE — TRANSFER OF CARE
"Anesthesia Transfer of Care Note    Patient: Percy Ramirez    Procedure(s) Performed: Procedure(s) (LRB):  CRANIOTOMY, Right supraorbital FOR HEMATOMA EVACUATION (Right)    Patient location: ICU    Anesthesia Type: general    Transport from OR: Transported from OR on 6-10 L/min O2 by face mask with adequate spontaneous ventilation. Continuous SpO2 monitoring in transport. Continuous ECG monitoring in transport. Continuos invasive BP monitoring in transport    Post pain: adequate analgesia    Post assessment: no apparent anesthetic complications and tolerated procedure well    Post vital signs: stable    Level of consciousness: responds to stimulation    Nausea/Vomiting: no nausea/vomiting    Complications: none    Transfer of care protocol was followed      Last vitals: Visit Vitals  BP (!) 150/69 (Patient Position: Lying)   Pulse 80   Temp 36.6 °C (97.9 °F) (Axillary)   Resp 15   Ht 5' 8" (1.727 m)   Wt 83.5 kg (184 lb)   SpO2 99%   BMI 27.98 kg/m²     "

## 2024-10-19 LAB
ALBUMIN SERPL BCP-MCNC: 2.6 G/DL (ref 3.5–5.2)
ALLENS TEST: ABNORMAL
ALP SERPL-CCNC: 69 U/L (ref 40–150)
ALT SERPL W/O P-5'-P-CCNC: 9 U/L (ref 10–44)
ANION GAP SERPL CALC-SCNC: 10 MMOL/L (ref 8–16)
ANION GAP SERPL CALC-SCNC: 10 MMOL/L (ref 8–16)
ANION GAP SERPL CALC-SCNC: 11 MMOL/L (ref 8–16)
ANION GAP SERPL CALC-SCNC: 12 MMOL/L (ref 8–16)
ANION GAP SERPL CALC-SCNC: 13 MMOL/L (ref 8–16)
ANION GAP SERPL CALC-SCNC: 8 MMOL/L (ref 8–16)
APTT PPP: 25 SEC (ref 21–32)
AST SERPL-CCNC: 19 U/L (ref 10–40)
BASOPHILS # BLD AUTO: 0.01 K/UL (ref 0–0.2)
BASOPHILS NFR BLD: 0.1 % (ref 0–1.9)
BILIRUB SERPL-MCNC: 0.2 MG/DL (ref 0.1–1)
BUN SERPL-MCNC: 29 MG/DL (ref 8–23)
BUN SERPL-MCNC: 29 MG/DL (ref 8–23)
BUN SERPL-MCNC: 31 MG/DL (ref 8–23)
BUN SERPL-MCNC: 34 MG/DL (ref 8–23)
BUN SERPL-MCNC: 36 MG/DL (ref 8–23)
BUN SERPL-MCNC: 36 MG/DL (ref 8–23)
CALCIUM SERPL-MCNC: 7.7 MG/DL (ref 8.7–10.5)
CALCIUM SERPL-MCNC: 7.7 MG/DL (ref 8.7–10.5)
CALCIUM SERPL-MCNC: 8.1 MG/DL (ref 8.7–10.5)
CALCIUM SERPL-MCNC: 8.2 MG/DL (ref 8.7–10.5)
CALCIUM SERPL-MCNC: 8.2 MG/DL (ref 8.7–10.5)
CALCIUM SERPL-MCNC: 8.4 MG/DL (ref 8.7–10.5)
CHLORIDE SERPL-SCNC: 118 MMOL/L (ref 95–110)
CHLORIDE SERPL-SCNC: 120 MMOL/L (ref 95–110)
CHLORIDE SERPL-SCNC: 120 MMOL/L (ref 95–110)
CHLORIDE SERPL-SCNC: 121 MMOL/L (ref 95–110)
CHLORIDE SERPL-SCNC: 121 MMOL/L (ref 95–110)
CHLORIDE SERPL-SCNC: 123 MMOL/L (ref 95–110)
CO2 SERPL-SCNC: 17 MMOL/L (ref 23–29)
CO2 SERPL-SCNC: 18 MMOL/L (ref 23–29)
CO2 SERPL-SCNC: 19 MMOL/L (ref 23–29)
CREAT SERPL-MCNC: 1.8 MG/DL (ref 0.5–1.4)
CREAT SERPL-MCNC: 1.8 MG/DL (ref 0.5–1.4)
CREAT SERPL-MCNC: 1.9 MG/DL (ref 0.5–1.4)
CREAT SERPL-MCNC: 2 MG/DL (ref 0.5–1.4)
CREAT SERPL-MCNC: 2.3 MG/DL (ref 0.5–1.4)
CREAT SERPL-MCNC: 2.4 MG/DL (ref 0.5–1.4)
DELSYS: ABNORMAL
DIFFERENTIAL METHOD BLD: ABNORMAL
EOSINOPHIL # BLD AUTO: 0 K/UL (ref 0–0.5)
EOSINOPHIL NFR BLD: 0 % (ref 0–8)
ERYTHROCYTE [DISTWIDTH] IN BLOOD BY AUTOMATED COUNT: 14.7 % (ref 11.5–14.5)
EST. GFR  (NO RACE VARIABLE): 26.4 ML/MIN/1.73 M^2
EST. GFR  (NO RACE VARIABLE): 27.8 ML/MIN/1.73 M^2
EST. GFR  (NO RACE VARIABLE): 32.9 ML/MIN/1.73 M^2
EST. GFR  (NO RACE VARIABLE): 35 ML/MIN/1.73 M^2
EST. GFR  (NO RACE VARIABLE): 37.3 ML/MIN/1.73 M^2
EST. GFR  (NO RACE VARIABLE): 37.3 ML/MIN/1.73 M^2
FIBRINOGEN PPP-MCNC: 391 MG/DL (ref 182–400)
FIO2: 40
GLUCOSE SERPL-MCNC: 155 MG/DL (ref 70–110)
GLUCOSE SERPL-MCNC: 156 MG/DL (ref 70–110)
GLUCOSE SERPL-MCNC: 163 MG/DL (ref 70–110)
GLUCOSE SERPL-MCNC: 193 MG/DL (ref 70–110)
GLUCOSE SERPL-MCNC: 201 MG/DL (ref 70–110)
GLUCOSE SERPL-MCNC: 205 MG/DL (ref 70–110)
HCO3 UR-SCNC: 21.2 MMOL/L (ref 24–28)
HCT VFR BLD AUTO: 28.2 % (ref 40–54)
HGB BLD-MCNC: 8.7 G/DL (ref 14–18)
IMM GRANULOCYTES # BLD AUTO: 0.08 K/UL (ref 0–0.04)
IMM GRANULOCYTES NFR BLD AUTO: 0.8 % (ref 0–0.5)
INR PPP: 1.1 (ref 0.8–1.2)
LYMPHOCYTES # BLD AUTO: 0.6 K/UL (ref 1–4.8)
LYMPHOCYTES NFR BLD: 6.2 % (ref 18–48)
MAGNESIUM SERPL-MCNC: 2.2 MG/DL (ref 1.6–2.6)
MCH RBC QN AUTO: 28 PG (ref 27–31)
MCHC RBC AUTO-ENTMCNC: 30.9 G/DL (ref 32–36)
MCV RBC AUTO: 91 FL (ref 82–98)
MIN VOL: 8.35
MODE: ABNORMAL
MONOCYTES # BLD AUTO: 1 K/UL (ref 0.3–1)
MONOCYTES NFR BLD: 9.5 % (ref 4–15)
NEUTROPHILS # BLD AUTO: 8.5 K/UL (ref 1.8–7.7)
NEUTROPHILS NFR BLD: 83.4 % (ref 38–73)
NRBC BLD-RTO: 0 /100 WBC
PCO2 BLDA: 43.5 MMHG (ref 35–45)
PEEP: 5
PH SMN: 7.3 [PH] (ref 7.35–7.45)
PHOSPHATE SERPL-MCNC: 1.7 MG/DL (ref 2.7–4.5)
PHOSPHATE SERPL-MCNC: 1.8 MG/DL (ref 2.7–4.5)
PHOSPHATE SERPL-MCNC: 2.3 MG/DL (ref 2.7–4.5)
PHOSPHATE SERPL-MCNC: 2.7 MG/DL (ref 2.7–4.5)
PHOSPHATE SERPL-MCNC: 2.9 MG/DL (ref 2.7–4.5)
PHOSPHATE SERPL-MCNC: 3.1 MG/DL (ref 2.7–4.5)
PLATELET # BLD AUTO: 173 K/UL (ref 150–450)
PMV BLD AUTO: 10.7 FL (ref 9.2–12.9)
PO2 BLDA: 107 MMHG (ref 80–100)
POC BE: -5 MMOL/L
POC SATURATED O2: 97 % (ref 95–100)
POC TCO2: 22 MMOL/L (ref 23–27)
POCT GLUCOSE: 136 MG/DL (ref 70–110)
POCT GLUCOSE: 136 MG/DL (ref 70–110)
POCT GLUCOSE: 146 MG/DL (ref 70–110)
POCT GLUCOSE: 149 MG/DL (ref 70–110)
POCT GLUCOSE: 161 MG/DL (ref 70–110)
POCT GLUCOSE: 165 MG/DL (ref 70–110)
POCT GLUCOSE: 169 MG/DL (ref 70–110)
POCT GLUCOSE: 176 MG/DL (ref 70–110)
POCT GLUCOSE: 177 MG/DL (ref 70–110)
POCT GLUCOSE: 177 MG/DL (ref 70–110)
POCT GLUCOSE: 184 MG/DL (ref 70–110)
POCT GLUCOSE: 198 MG/DL (ref 70–110)
POCT GLUCOSE: 204 MG/DL (ref 70–110)
POCT GLUCOSE: 205 MG/DL (ref 70–110)
POCT GLUCOSE: 209 MG/DL (ref 70–110)
POCT GLUCOSE: 214 MG/DL (ref 70–110)
POCT GLUCOSE: 222 MG/DL (ref 70–110)
POCT GLUCOSE: 225 MG/DL (ref 70–110)
POCT GLUCOSE: 234 MG/DL (ref 70–110)
POCT GLUCOSE: 279 MG/DL (ref 70–110)
POCT GLUCOSE: 308 MG/DL (ref 70–110)
POTASSIUM SERPL-SCNC: 3.6 MMOL/L (ref 3.5–5.1)
POTASSIUM SERPL-SCNC: 3.6 MMOL/L (ref 3.5–5.1)
POTASSIUM SERPL-SCNC: 3.7 MMOL/L (ref 3.5–5.1)
POTASSIUM SERPL-SCNC: 3.8 MMOL/L (ref 3.5–5.1)
POTASSIUM SERPL-SCNC: 3.9 MMOL/L (ref 3.5–5.1)
POTASSIUM SERPL-SCNC: 4 MMOL/L (ref 3.5–5.1)
PROT SERPL-MCNC: 5.5 G/DL (ref 6–8.4)
PROTHROMBIN TIME: 12 SEC (ref 9–12.5)
PS: 5
RBC # BLD AUTO: 3.11 M/UL (ref 4.6–6.2)
SAMPLE: ABNORMAL
SITE: ABNORMAL
SODIUM SERPL-SCNC: 145 MMOL/L (ref 136–145)
SODIUM SERPL-SCNC: 145 MMOL/L (ref 136–145)
SODIUM SERPL-SCNC: 148 MMOL/L (ref 136–145)
SODIUM SERPL-SCNC: 149 MMOL/L (ref 136–145)
SODIUM SERPL-SCNC: 150 MMOL/L (ref 136–145)
SODIUM SERPL-SCNC: 151 MMOL/L (ref 136–145)
SODIUM SERPL-SCNC: 152 MMOL/L (ref 136–145)
SP02: 99
SPONT RATE: 12
WBC # BLD AUTO: 10.13 K/UL (ref 3.9–12.7)

## 2024-10-19 PROCEDURE — 36620 INSERTION CATHETER ARTERY: CPT

## 2024-10-19 PROCEDURE — 99900035 HC TECH TIME PER 15 MIN (STAT)

## 2024-10-19 PROCEDURE — 37799 UNLISTED PX VASCULAR SURGERY: CPT

## 2024-10-19 PROCEDURE — 88304 TISSUE EXAM BY PATHOLOGIST: CPT | Performed by: STUDENT IN AN ORGANIZED HEALTH CARE EDUCATION/TRAINING PROGRAM

## 2024-10-19 PROCEDURE — 85025 COMPLETE CBC W/AUTO DIFF WBC: CPT

## 2024-10-19 PROCEDURE — 84295 ASSAY OF SERUM SODIUM: CPT

## 2024-10-19 PROCEDURE — 80053 COMPREHEN METABOLIC PANEL: CPT

## 2024-10-19 PROCEDURE — 20000000 HC ICU ROOM

## 2024-10-19 PROCEDURE — 88305 TISSUE EXAM BY PATHOLOGIST: CPT | Performed by: STUDENT IN AN ORGANIZED HEALTH CARE EDUCATION/TRAINING PROGRAM

## 2024-10-19 PROCEDURE — 51798 US URINE CAPACITY MEASURE: CPT

## 2024-10-19 PROCEDURE — 99900026 HC AIRWAY MAINTENANCE (STAT)

## 2024-10-19 PROCEDURE — 00C40ZZ EXTIRPATION OF MATTER FROM INTRACRANIAL SUBDURAL SPACE, OPEN APPROACH: ICD-10-PCS | Performed by: NEUROLOGICAL SURGERY

## 2024-10-19 PROCEDURE — 82803 BLOOD GASES ANY COMBINATION: CPT

## 2024-10-19 PROCEDURE — 99291 CRITICAL CARE FIRST HOUR: CPT | Mod: GC,,, | Performed by: STUDENT IN AN ORGANIZED HEALTH CARE EDUCATION/TRAINING PROGRAM

## 2024-10-19 PROCEDURE — 63600175 PHARM REV CODE 636 W HCPCS: Performed by: NURSE ANESTHETIST, CERTIFIED REGISTERED

## 2024-10-19 PROCEDURE — 25000003 PHARM REV CODE 250

## 2024-10-19 PROCEDURE — 94799 UNLISTED PULMONARY SVC/PX: CPT

## 2024-10-19 PROCEDURE — 63600175 PHARM REV CODE 636 W HCPCS

## 2024-10-19 PROCEDURE — 25000003 PHARM REV CODE 250: Performed by: NURSE ANESTHETIST, CERTIFIED REGISTERED

## 2024-10-19 PROCEDURE — 85730 THROMBOPLASTIN TIME PARTIAL: CPT

## 2024-10-19 PROCEDURE — 80048 BASIC METABOLIC PNL TOTAL CA: CPT | Mod: 91,XB

## 2024-10-19 PROCEDURE — C1751 CATH, INF, PER/CENT/MIDLINE: HCPCS

## 2024-10-19 PROCEDURE — 00U20JZ SUPPLEMENT DURA MATER WITH SYNTHETIC SUBSTITUTE, OPEN APPROACH: ICD-10-PCS | Performed by: NEUROLOGICAL SURGERY

## 2024-10-19 PROCEDURE — 27100171 HC OXYGEN HIGH FLOW UP TO 24 HOURS

## 2024-10-19 PROCEDURE — 25500020 PHARM REV CODE 255: Performed by: STUDENT IN AN ORGANIZED HEALTH CARE EDUCATION/TRAINING PROGRAM

## 2024-10-19 PROCEDURE — 63600175 PHARM REV CODE 636 W HCPCS: Performed by: INTERNAL MEDICINE

## 2024-10-19 PROCEDURE — 85610 PROTHROMBIN TIME: CPT

## 2024-10-19 PROCEDURE — 84100 ASSAY OF PHOSPHORUS: CPT | Mod: 91

## 2024-10-19 PROCEDURE — 83735 ASSAY OF MAGNESIUM: CPT

## 2024-10-19 PROCEDURE — 88307 TISSUE EXAM BY PATHOLOGIST: CPT | Mod: 26,,, | Performed by: STUDENT IN AN ORGANIZED HEALTH CARE EDUCATION/TRAINING PROGRAM

## 2024-10-19 PROCEDURE — 27200966 HC CLOSED SUCTION SYSTEM

## 2024-10-19 PROCEDURE — 63600175 PHARM REV CODE 636 W HCPCS: Performed by: STUDENT IN AN ORGANIZED HEALTH CARE EDUCATION/TRAINING PROGRAM

## 2024-10-19 PROCEDURE — 94002 VENT MGMT INPAT INIT DAY: CPT

## 2024-10-19 PROCEDURE — 85384 FIBRINOGEN ACTIVITY: CPT

## 2024-10-19 PROCEDURE — 94761 N-INVAS EAR/PLS OXIMETRY MLT: CPT

## 2024-10-19 RX ORDER — ATORVASTATIN CALCIUM 40 MG/1
40 TABLET, FILM COATED ORAL DAILY
Status: DISCONTINUED | OUTPATIENT
Start: 2024-10-20 | End: 2024-11-01

## 2024-10-19 RX ORDER — FAMOTIDINE 20 MG/1
20 TABLET, FILM COATED ORAL DAILY
Status: DISCONTINUED | OUTPATIENT
Start: 2024-10-20 | End: 2024-10-21

## 2024-10-19 RX ORDER — VALSARTAN 40 MG/1
40 TABLET ORAL DAILY
Status: DISCONTINUED | OUTPATIENT
Start: 2024-10-20 | End: 2024-10-21

## 2024-10-19 RX ORDER — CEFAZOLIN 2 G/1
2 INJECTION, POWDER, FOR SOLUTION INTRAMUSCULAR; INTRAVENOUS
Status: DISCONTINUED | OUTPATIENT
Start: 2024-10-19 | End: 2024-10-19

## 2024-10-19 RX ORDER — CARVEDILOL 6.25 MG/1
6.25 TABLET ORAL 2 TIMES DAILY WITH MEALS
Status: DISCONTINUED | OUTPATIENT
Start: 2024-10-19 | End: 2024-10-20

## 2024-10-19 RX ORDER — AMOXICILLIN 250 MG
1 CAPSULE ORAL 2 TIMES DAILY
Status: DISCONTINUED | OUTPATIENT
Start: 2024-10-19 | End: 2024-10-22

## 2024-10-19 RX ORDER — PROPOFOL 10 MG/ML
VIAL (ML) INTRAVENOUS CONTINUOUS PRN
Status: DISCONTINUED | OUTPATIENT
Start: 2024-10-19 | End: 2024-10-19

## 2024-10-19 RX ORDER — POLYETHYLENE GLYCOL 3350 17 G/17G
17 POWDER, FOR SOLUTION ORAL DAILY
Status: DISCONTINUED | OUTPATIENT
Start: 2024-10-20 | End: 2024-10-23

## 2024-10-19 RX ORDER — CEFAZOLIN 2 G/1
2 INJECTION, POWDER, FOR SOLUTION INTRAMUSCULAR; INTRAVENOUS
Status: DISCONTINUED | OUTPATIENT
Start: 2024-10-19 | End: 2024-10-20

## 2024-10-19 RX ADMIN — FENTANYL CITRATE 25 MCG: 50 INJECTION, SOLUTION INTRAMUSCULAR; INTRAVENOUS at 12:10

## 2024-10-19 RX ADMIN — IOHEXOL 100 ML: 350 INJECTION, SOLUTION INTRAVENOUS at 01:10

## 2024-10-19 RX ADMIN — MUPIROCIN: 20 OINTMENT TOPICAL at 09:10

## 2024-10-19 RX ADMIN — INSULIN HUMAN 6.7 UNITS/HR: 1 INJECTION, SOLUTION INTRAVENOUS at 01:10

## 2024-10-19 RX ADMIN — SENNOSIDES AND DOCUSATE SODIUM 1 TABLET: 50; 8.6 TABLET ORAL at 09:10

## 2024-10-19 RX ADMIN — CEFAZOLIN 2 G: 2 INJECTION, POWDER, FOR SOLUTION INTRAMUSCULAR; INTRAVENOUS at 02:10

## 2024-10-19 RX ADMIN — LEVETIRACETAM 500 MG: 100 INJECTION INTRAVENOUS at 09:10

## 2024-10-19 RX ADMIN — NICARDIPINE HYDROCHLORIDE 5 MG/HR: 0.2 INJECTION, SOLUTION INTRAVENOUS at 03:10

## 2024-10-19 RX ADMIN — CEFAZOLIN 2 G: 2 INJECTION, POWDER, FOR SOLUTION INTRAMUSCULAR; INTRAVENOUS at 09:10

## 2024-10-19 RX ADMIN — PROPOFOL 25 MCG/KG/MIN: 10 INJECTION, EMULSION INTRAVENOUS at 01:10

## 2024-10-19 RX ADMIN — CARVEDILOL 6.25 MG: 6.25 TABLET, FILM COATED ORAL at 04:10

## 2024-10-19 RX ADMIN — ROCURONIUM BROMIDE 20 MG: 10 INJECTION, SOLUTION INTRAVENOUS at 12:10

## 2024-10-19 RX ADMIN — SUGAMMADEX 200 MG: 100 INJECTION, SOLUTION INTRAVENOUS at 12:10

## 2024-10-19 NOTE — TRANSFER OF CARE
"Anesthesia Transfer of Care Note    Patient: Percy Ramirez    Procedure(s) Performed: Procedure(s) (LRB):  CRANIOTOMY, FOR SUBDURAL HEMATOMA EVACUATION (Right)    Patient location: ICU    Anesthesia Type: general    Transport from OR: Transported from OR intubated on 100% O2 by AMBU with adequate controlled ventilation. Upon arrival to PACU/ICU, patient attached to ventilator and auscultated to confirm bilateral breath sounds and adequate TV. Continuous ECG monitoring in transport. Continuous SpO2 monitoring in transport. Continuos invasive BP monitoring in transport    Post pain: adequate analgesia    Post assessment: no apparent anesthetic complications and tolerated procedure well    Post vital signs: stable    Level of consciousness: sedated    Nausea/Vomiting: no nausea/vomiting    Complications: none    Transfer of care protocol was followed      Last vitals: Visit Vitals  /63 (BP Location: Right arm, Patient Position: Lying)   Pulse 94   Temp 37.1 °C (98.8 °F) (Axillary)   Resp 15   Ht 5' 8" (1.727 m)   Wt 83.5 kg (184 lb)   SpO2 (!) 94%   BMI 27.98 kg/m²     "

## 2024-10-19 NOTE — PLAN OF CARE
[facility] Care Plan  POC reviewed with Percy Ramirez and family at 1400. Family verbalized understanding. Questions and concerns addressed. No acute events today. Pt progressing toward goals. Will continue to monitor. See below and flowsheets for full assessment and VS info.     -Subgaleal and subdural drain in placed  -CT done  -insulin gtt 1.1  -cardene @ 5  -Longoria in place    Is this a stroke patient? no    Neuro:  [place] Coma Scale  Best Eye Response: 2-->(E2) to pain  Best Motor Response: 5-->(M5) localizes pain  Best Verbal Response: 1-->(V1) none  [place] Coma Scale Score: 8  Assessment Qualifiers: patient intubated  Pupil PERRLA: yes     24 hr Temp:  [97.7 °F (36.5 °C)-99.4 °F (37.4 °C)]     CV:   Rhythm: normal sinus rhythm  BP goals:   SBP < 140  MAP > 65    Resp:      Vent Mode: A/C  Set Rate: 16 BPM  Oxygen Concentration (%): 50  Vt Set: 500 mL  PEEP/CPAP: 5 cmH20    Plan: wean to extubate    GI/:     Diet/Nutrition Received: NPO  Last Bowel Movement: 10/18/24  Voiding Characteristics: urethral catheter (bladder)    Intake/Output Summary (Last 24 hours) at 10/19/2024 0637  Last data filed at 10/19/2024 0605  Gross per 24 hour   Intake 3185.25 ml   Output 2680 ml   Net 505.25 ml          Labs/Accuchecks:  Recent Labs   Lab 10/19/24  0158   WBC 10.13   RBC 3.11*   HGB 8.7*   HCT 28.2*         Recent Labs   Lab 10/19/24  0158     145   K 3.6  3.6   CO2 17*  17*   *  118*   BUN 29*  29*   CREATININE 1.8*  1.8*   ALKPHOS 69   ALT 9*   AST 19   BILITOT 0.2      Recent Labs   Lab 10/19/24  0158   INR 1.1   APTT 25.0      Recent Labs   Lab 10/17/24  1928   TROPONINI 0.022       Electrolytes: N/A - electrolytes WDL  Accuchecks: Q1H    Gtts:   D5 and 0.45% NaCl   Intravenous Continuous PRN        insulin regular 1 units/mL infusion orderable (DKA)  0-52 Units/hr Intravenous Continuous 1.1 mL/hr at 10/19/24 0605 1.1 Units/hr at 10/19/24 0605    nicardipine  0-15 mg/hr Intravenous Continuous  37.5 mL/hr at 10/19/24 0605 7.5 mg/hr at 10/19/24 0605       LDA/Wounds:    Mykel Risk Assessment  Sensory Perception: 4-->no impairment  Moisture: 4-->rarely moist  Activity: 1-->bedfast  Mobility: 2-->very limited  Nutrition: 2-->probably inadequate  Friction and Shear: 3-->no apparent problem  Mykel Score: 16    Is your mykel score 12 or less? no      Nurses Note -- 4 Eyes    Is there altered skin present?  yes  Second RN/Staff Member:  RADHA Obregon      [facility]

## 2024-10-19 NOTE — SUBJECTIVE & OBJECTIVE
"  Past Medical History:   Diagnosis Date    Allergy     Arthritis     CAD, multiple vessel     DR Melissa    Cataract     Depression     History of colon polyps     Hyperlipidemia     Hypertension     Macular degeneration     Dr Razo for injection, Jennifer for eye    S/P CABG x 2     Type 2 diabetes mellitus with circulatory disorder     Dr. Aquino     Past Surgical History:   Procedure Laterality Date    broken elbow      left    COLONOSCOPY N/A 10/4/2017    Procedure: COLONOSCOPY;  Surgeon: Gabriel Leung MD;  Location: [facility];  Service: Endoscopy;  Laterality: N/A;    EYE SURGERY      cataract    heart bypass      2004    HERNIA REPAIR      right    ROTATOR CUFF REPAIR      right  2004      No current facility-administered medications on file prior to encounter.     Current Outpatient Medications on File Prior to Encounter   Medication Sig Dispense Refill    acetaminophen (TYLENOL) 500 MG tablet Take 1 tablet (500 mg total) by mouth every 6 (six) hours as needed. 13 tablet 0    albuterol (PROVENTIL) 2.5 mg /3 mL (0.083 %) nebulizer solution Take 3 mLs (2.5 mg total) by nebulization every 6 to 8 hours as needed for Wheezing. 100 mL 3    albuterol (PROVENTIL/VENTOLIN HFA) 90 mcg/actuation inhaler INHALE 2 PUFFS BY MOUTH EVERY 6 HOURS AS NEEDED FOR WHEEZING OR  SHORTNESS  OF  BREATH 8 g 3    atorvastatin (LIPITOR) 40 MG tablet Take 1 tablet by mouth every morning.      BD INSULIN PEN NEEDLE UF SHORT 31 gauge x 5/16" Ndle       BD INSULIN SYRINGE ULTRA-FINE 1/2 mL 31 gauge x 15/64" Syrg       blood sugar diagnostic Strp To check BG 3 times daily, to use with insurance preferred meter 200 strip 11    carvediloL (COREG) 3.125 MG tablet Take 1 tablet (3.125 mg total) by mouth 2 (two) times daily with meals. 180 tablet 0    ceramides 1,3,6-II (CERAVE DAILY MOISTURIZING) Lotn Apply twice daily to skin 355 mL 1    cinnamon bark 500 mg capsule Take 1,000 mg by mouth once daily.        clopidogreL (PLAVIX) 75 mg " "tablet Take 75 mg by mouth.      diclofenac sodium (VOLTAREN) 1 % Gel Apply 2 g topically 3 (three) times daily. 50 g 0    DULoxetine (CYMBALTA) 60 MG capsule Take 1 capsule (60 mg total) by mouth once daily. 90 capsule 3    fluticasone propionate (FLONASE) 50 mcg/actuation nasal spray 1 spray (50 mcg total) by Each Nostril route 2 (two) times daily. 18.2 mL 3    furosemide (LASIX) 40 MG tablet Take 40 mg by mouth once daily.      gabapentin (NEURONTIN) 300 MG capsule Take 4 capsules (1,200 mg total) by mouth 2 (two) times daily. 540 capsule 1    glimepiride (AMARYL) 4 MG tablet Take 4 mg by mouth daily with breakfast.       HYDROcodone-acetaminophen (NORCO) 7.5-325 mg per tablet Take 1 tablet by mouth every 8 (eight) hours as needed for Pain. 90 tablet 0    [START ON 11/3/2024] HYDROcodone-acetaminophen (NORCO) 7.5-325 mg per tablet Take 1 tablet by mouth every 8 (eight) hours as needed for Pain. 90 tablet 0    insulin NPH-insulin regular, 70/30, (NOVOLIN 70/30) 100 unit/mL (70-30) injection Inject into the skin. Inject 10 units at breakfast and inject 10 units at night      insulin syringe-needle U-100 0.3 mL 31 gauge x 15/64" Syrg USE TO INJECT INSULIN THREE TIMES DAILY      insulin syringe-needle U-100 1/2 mL 31 x 5/16" Syrg       isosorbide mononitrate (IMDUR) 30 MG 24 hr tablet Take 30 mg by mouth once daily.      lancets Misc To check BG 3 times daily, to use with insurance preferred meter 200 each 11    levETIRAcetam (KEPPRA) 500 MG Tab Take 1 tablet (500 mg total) by mouth 2 (two) times daily. for 7 days 14 tablet 0    LIDOcaine (LIDODERM) 5 % Place 1 patch onto the skin once daily. Remove & Discard patch within 12 hours or as directed by MD 5 patch 1    meclizine (ANTIVERT) 12.5 mg tablet Take 1 tablet (12.5 mg total) by mouth 3 (three) times daily as needed for Dizziness. 90 tablet 0    methocarbamoL (ROBAXIN) 500 MG Tab TAKE 1 TABLET BY MOUTH 4 TIMES DAILY FOR 10 DAYS 60 tablet 0    mupirocin (BACTROBAN) " 2 % ointment Apply topically 3 (three) times daily. 1 g 1    nitroGLYCERIN (NITROSTAT) 0.4 MG SL tablet DISSOLVE 1 TABLET UNDER THE TONGUE EVERY 5 MINUTES AS  NEEDED FOR CHEST PAIN. MAX  OF 3 TABLETS IN 15 MINUTES. CALL 911 IF PAIN PERSISTS.      omeprazole (PRILOSEC) 20 MG capsule Take 1 capsule (20 mg total) by mouth once daily. 30 capsule 2    pravastatin (PRAVACHOL) 20 MG tablet Take 1 tablet (20 mg total) by mouth once daily. 90 tablet 1    spironolactone (ALDACTONE) 25 MG tablet Take 0.5 tablets (12.5 mg total) by mouth once daily.      tiotropium-olodateroL (STIOLTO RESPIMAT) 2.5-2.5 mcg/actuation Mist Inhale 2 puffs into the lungs once daily. Controller 1 g 11    triamcinolone acetonide 0.1% (KENALOG) 0.1 % cream SMARTSI Application Topical 2-3 Times Daily      valsartan (DIOVAN) 40 MG tablet Take 1 tablet (40 mg total) by mouth once daily. 90 tablet 0    VIT C/VIT E AC/LUT/COPPER/ZINC (PRESERVISION LUTEIN ORAL) Take by mouth.      warfarin (COUMADIN) 5 MG tablet Take 2 tabs by mouth on Tuesday,Thursday, Saturday and Sundays then 1.5 on all other days.        Allergies: Aricept odt [donepezil], Codeine, and Ranexa [ranolazine]  Family History   Problem Relation Name Age of Onset    Arthritis Mother      Diabetes Mother      Stroke Mother      Heart attack Father      Cancer Brother      Throat cancer Brother      Diabetes Maternal Aunt      Diabetes Maternal Uncle      Heart disease Maternal Uncle      Diabetes Paternal Aunt      Heart disease Paternal Aunt      Heart disease Paternal Uncle      Heart disease Maternal Grandmother      Cancer Maternal Grandfather       Social History     Tobacco Use    Smoking status: Former     Current packs/day: 0.00     Average packs/day: 3.0 packs/day for 45.0 years (135.0 ttl pk-yrs)     Types: Cigarettes     Start date: 1959     Quit date: 2004     Years since quittin.5    Smokeless tobacco: Former   Substance Use Topics    Alcohol use: Yes      Comment: was heavy drinker 35 years ago, rare use now    Drug use: No     Review of Systems   Unable to perform ROS: Intubated     Objective:     Vitals:    Temp: 97.9 °F (36.6 °C)  Pulse: 104  Rhythm: atrial rhythm, sinus tachycardia  BP: 139/61  MAP (mmHg): 88  Resp: 13  SpO2: 100 %  Oxygen Concentration (%): 40  Vent Mode: Spont  Set Rate: 16 BPM  Vt Set: 500 mL  Pressure Support: 5 cmH20  PEEP/CPAP: 5 cmH20  Peak Airway Pressure: 11 cmH20  Mean Airway Pressure: 7.3 cmH20  Plateau Pressure: 13 cmH20    Temp  Min: 97.6 °F (36.4 °C)  Max: 99.4 °F (37.4 °C)  Pulse  Min: 76  Max: 117  BP  Min: 104/55  Max: 153/70  MAP (mmHg)  Min: 75  Max: 106  Resp  Min: 11  Max: 21  SpO2  Min: 91 %  Max: 100 %  Oxygen Concentration (%)  Min: 40  Max: 50    10/18 0701 - 10/19 0700  In: 3185.3 [I.V.:1935.3]  Out: 2680 [Urine:2275; Drains:105]            Physical Exam  Vitals and nursing note reviewed.   Constitutional:       Comments: Not responsive to verbal or physical stimuli   HENT:      Head: Normocephalic and atraumatic.      Right Ear: External ear normal.      Left Ear: External ear normal.      Nose: Nose normal.      Mouth/Throat:      Mouth: Mucous membranes are dry.      Pharynx: Oropharynx is clear.   Eyes:      Extraocular Movements: Extraocular movements intact.      Conjunctiva/sclera: Conjunctivae normal.   Cardiovascular:      Rate and Rhythm: Tachycardia present. Rhythm irregular.   Pulmonary:      Effort: No respiratory distress.      Comments: Intubated  Abdominal:      General: There is no distension.      Palpations: Abdomen is soft.      Tenderness: There is no abdominal tenderness. There is no guarding.   Musculoskeletal:      Right lower leg: No edema.      Left lower leg: No edema.      Comments: Can not assess muscle movement given his current status   Skin:     General: Skin is warm and dry.      Capillary Refill: Capillary refill takes less than 2 seconds.      Findings: Bruising present.   Neurological:       Comments: Can not perform full neurologic exam as the patient is not responding to any verbal or physical stimuli.  He is off all sedation   Psychiatric:      Comments: Can not assess given his current mental status       Unable to test orientation, language, memory, judgment, insight, fund of knowledge, coordination, gait due to level of consciousness.       Today I personally reviewed pertinent medications, lines/drains/airways, imaging, cardiology results, laboratory results, microbiology results, notably:    PT 12.9 from 27.3, INR 1.2 from 2.6  aPTT 27 from 47    Cr 2.2 from 1.8   BUN 31  Na 132 and , corrected Na 138  Beta hydroxybutyrate 0.2, UA negative for ketones   HbA1c 9.9%    CTH 10/17/24 @ 1948  Large 5 cm intraparenchymal hemorrhage centered in the right frontal lobe. Additional small amount of acute subdural hemorrhage seen along the right aspect of the interhemispheric falx. Apparent small subarachnoid component of hemorrhage is also seen with small amount of layering blood also seen within the posterior horns of the lateral ventricles.

## 2024-10-19 NOTE — ASSESSMENT & PLAN NOTE
Cr 1.8 on initial presentation to ED and now 2.2    Avoid nephrotoxic agents  Renally dose medications  Q 1 hour I&Os  See LEANDRO  Starting to trend down, now 1.9

## 2024-10-19 NOTE — ASSESSMENT & PLAN NOTE
83 y.o. 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 Given K-centra at OSH:    Imaging:  -CTH OSH 10/17: 5cm R frontal ICH with 7mm MLS and some intraventricular blood in posterior lateral vent   -CT Csp OSH 10/17: no acute processes   -rCTH 10/18: grossly stable   -CTA 10/18: post op clot evac with new holoconvexity R SDH, 12 mm MLS   -Post op CTH 10/18: s/p SDH evacuation with appropriate evac, CTA neg     POD 1 for R SDH evac    Plan:  Patient admitted to ICU on telemetry, q1h neuro checks  Post op CTH shows good clot evac  CTA negative for aneurysm or underlying vascular malformation  Hold anti-plt/coag medications. Given K-centra for reversal at OSH. INR 1.2 on arrival  SBP <140 (Cardene gtt; Hydralazine & Labetalol PRN; Transition to home meds when appropriate)  Na >135  Keppra 500 BID  HOB > 30  WTE plan per NCC  Continue to monitor clinically, notify NSGY immediately with any changes in neuro status    Plan discussed with Dr. Condon    Dispo: admitted to ICU

## 2024-10-19 NOTE — ASSESSMENT & PLAN NOTE
83M PMHx of HTN, HLD, DM2, CAD s/p CABG x2, Afib on Coumadin, ILD on 4L of O2, HFrEF (45%), presenting as a transfer for a large traumatic R frontal IPH reversed with Kcentra and vitamin K with repeat INR 1.2.    Admit to [facility]  q1h neuro checks, vitals, and I&O's  CBC, CMP, mag, and phos daily   Coags, TSH, A1c, lipid panel, UA  Echo, EKG, CXR  Hold AC/AP  4h interval CTH   Given unwitnessed fall, may consider MRI/MRA for further evaluation and would like to avoid CTA with current LEANDRO on CKD  SBP <140  Na goals > 145.  PRN boluses of 3% hypertonic saline if sodium less than 145  Keppra 500mg BID x7d   Neurosurgery consulted   NPO   SCDs; hold chemical VTE ppx in acute setting   PT/OT/SLP

## 2024-10-19 NOTE — PROGRESS NOTES
[facility] Neuro Critical Care  Neurocritical Care  Progress Note    Admit Date: 10/17/2024  Service Date: 10/19/2024  Length of Stay: 2    Subjective:     Chief Complaint: Traumatic right-sided intracerebral hemorrhage without loss of consciousness    History of Present Illness: Percy Ramirez is a 83M PMHx of HTN, HLD, DM2, CAD s/p CABG x2, Afib on Coumadin, ILD on 4L of O2, HFrEF (45%), presenting as a transfer for a large traumatic R frontal IPH. Pt initially presented to [facility] ED yesterday ago after mechanical fall and was found to have 4mm parafalcine SDH. Repeat CTH was stable. Pt was discharged home on keppra and instructed to hold his blood thinners. On 10/17/24 pt was lethargic and fell. He was brought back to  ED. Blood glucose was >600 and he was febrile (101F). CTH revealed 5cm R frontal IPH with 7mm MLS. Kcentra, 10mg vitamin K, and 3% HTS bolus were given. He was transferred to [facility] for higher level of care.     Hospital Course: 10/19/2024: POD #1 from both his MI LS as well as his right subdural/intraparenchymal hemorrhage evacuation.  He is still intubated and although sedation is off, he is minimally responsive. Coreg doubled. NG tube placed and tube feeds started. Still on insulin drip      Past Medical History:   Diagnosis Date    Allergy     Arthritis     CAD, multiple vessel     DR Melissa    Cataract     Depression     History of colon polyps     Hyperlipidemia     Hypertension     Macular degeneration     Dr Razo for injection, Jennifer for eye    S/P CABG x 2     Type 2 diabetes mellitus with circulatory disorder     Dr. Aquino     Past Surgical History:   Procedure Laterality Date    broken elbow      left    COLONOSCOPY N/A 10/4/2017    Procedure: COLONOSCOPY;  Surgeon: Gabriel Leung MD;  Location: [facility] ENDO;  Service: Endoscopy;  Laterality: N/A;    EYE SURGERY      cataract    heart bypass      2004    HERNIA REPAIR      right    ROTATOR CUFF REPAIR      right  2004     "  No current facility-administered medications on file prior to encounter.     Current Outpatient Medications on File Prior to Encounter   Medication Sig Dispense Refill    acetaminophen (TYLENOL) 500 MG tablet Take 1 tablet (500 mg total) by mouth every 6 (six) hours as needed. 13 tablet 0    albuterol (PROVENTIL) 2.5 mg /3 mL (0.083 %) nebulizer solution Take 3 mLs (2.5 mg total) by nebulization every 6 to 8 hours as needed for Wheezing. 100 mL 3    albuterol (PROVENTIL/VENTOLIN HFA) 90 mcg/actuation inhaler INHALE 2 PUFFS BY MOUTH EVERY 6 HOURS AS NEEDED FOR WHEEZING OR  SHORTNESS  OF  BREATH 8 g 3    atorvastatin (LIPITOR) 40 MG tablet Take 1 tablet by mouth every morning.      BD INSULIN PEN NEEDLE UF SHORT 31 gauge x 5/16" Ndle       BD INSULIN SYRINGE ULTRA-FINE 1/2 mL 31 gauge x 15/64" Syrg       blood sugar diagnostic Strp To check BG 3 times daily, to use with insurance preferred meter 200 strip 11    carvediloL (COREG) 3.125 MG tablet Take 1 tablet (3.125 mg total) by mouth 2 (two) times daily with meals. 180 tablet 0    ceramides 1,3,6-II (CERAVE DAILY MOISTURIZING) Lotn Apply twice daily to skin 355 mL 1    cinnamon bark 500 mg capsule Take 1,000 mg by mouth once daily.        clopidogreL (PLAVIX) 75 mg tablet Take 75 mg by mouth.      diclofenac sodium (VOLTAREN) 1 % Gel Apply 2 g topically 3 (three) times daily. 50 g 0    DULoxetine (CYMBALTA) 60 MG capsule Take 1 capsule (60 mg total) by mouth once daily. 90 capsule 3    fluticasone propionate (FLONASE) 50 mcg/actuation nasal spray 1 spray (50 mcg total) by Each Nostril route 2 (two) times daily. 18.2 mL 3    furosemide (LASIX) 40 MG tablet Take 40 mg by mouth once daily.      gabapentin (NEURONTIN) 300 MG capsule Take 4 capsules (1,200 mg total) by mouth 2 (two) times daily. 540 capsule 1    glimepiride (AMARYL) 4 MG tablet Take 4 mg by mouth daily with breakfast.       HYDROcodone-acetaminophen (NORCO) 7.5-325 mg per tablet Take 1 tablet by mouth " "every 8 (eight) hours as needed for Pain. 90 tablet 0    [START ON 11/3/2024] HYDROcodone-acetaminophen (NORCO) 7.5-325 mg per tablet Take 1 tablet by mouth every 8 (eight) hours as needed for Pain. 90 tablet 0    insulin NPH-insulin regular, 70/30, (NOVOLIN 70/30) 100 unit/mL (70-30) injection Inject into the skin. Inject 10 units at breakfast and inject 10 units at night      insulin syringe-needle U-100 0.3 mL 31 gauge x 15/64" Syrg USE TO INJECT INSULIN THREE TIMES DAILY      insulin syringe-needle U-100 1/2 mL 31 x 5/16" Syrg       isosorbide mononitrate (IMDUR) 30 MG 24 hr tablet Take 30 mg by mouth once daily.      lancets Misc To check BG 3 times daily, to use with insurance preferred meter 200 each 11    levETIRAcetam (KEPPRA) 500 MG Tab Take 1 tablet (500 mg total) by mouth 2 (two) times daily. for 7 days 14 tablet 0    LIDOcaine (LIDODERM) 5 % Place 1 patch onto the skin once daily. Remove & Discard patch within 12 hours or as directed by MD 5 patch 1    meclizine (ANTIVERT) 12.5 mg tablet Take 1 tablet (12.5 mg total) by mouth 3 (three) times daily as needed for Dizziness. 90 tablet 0    methocarbamoL (ROBAXIN) 500 MG Tab TAKE 1 TABLET BY MOUTH 4 TIMES DAILY FOR 10 DAYS 60 tablet 0    mupirocin (BACTROBAN) 2 % ointment Apply topically 3 (three) times daily. 1 g 1    nitroGLYCERIN (NITROSTAT) 0.4 MG SL tablet DISSOLVE 1 TABLET UNDER THE TONGUE EVERY 5 MINUTES AS  NEEDED FOR CHEST PAIN. MAX  OF 3 TABLETS IN 15 MINUTES. CALL 911 IF PAIN PERSISTS.      omeprazole (PRILOSEC) 20 MG capsule Take 1 capsule (20 mg total) by mouth once daily. 30 capsule 2    pravastatin (PRAVACHOL) 20 MG tablet Take 1 tablet (20 mg total) by mouth once daily. 90 tablet 1    spironolactone (ALDACTONE) 25 MG tablet Take 0.5 tablets (12.5 mg total) by mouth once daily.      tiotropium-olodateroL (STIOLTO RESPIMAT) 2.5-2.5 mcg/actuation Mist Inhale 2 puffs into the lungs once daily. Controller 1 g 11    triamcinolone acetonide 0.1% " (KENALOG) 0.1 % cream SMARTSI Application Topical 2-3 Times Daily      valsartan (DIOVAN) 40 MG tablet Take 1 tablet (40 mg total) by mouth once daily. 90 tablet 0    VIT C/VIT E AC/LUT/COPPER/ZINC (PRESERVISION LUTEIN ORAL) Take by mouth.      warfarin (COUMADIN) 5 MG tablet Take 2 tabs by mouth on Tuesday,Thursday, Saturday and Sundays then 1.5 on all other days.        Allergies: Aricept odt [donepezil], Codeine, and Ranexa [ranolazine]  Family History   Problem Relation Name Age of Onset    Arthritis Mother      Diabetes Mother      Stroke Mother      Heart attack Father      Cancer Brother      Throat cancer Brother      Diabetes Maternal Aunt      Diabetes Maternal Uncle      Heart disease Maternal Uncle      Diabetes Paternal Aunt      Heart disease Paternal Aunt      Heart disease Paternal Uncle      Heart disease Maternal Grandmother      Cancer Maternal Grandfather       Social History     Tobacco Use    Smoking status: Former     Current packs/day: 0.00     Average packs/day: 3.0 packs/day for 45.0 years (135.0 ttl pk-yrs)     Types: Cigarettes     Start date: 1959     Quit date: 2004     Years since quittin.5    Smokeless tobacco: Former   Substance Use Topics    Alcohol use: Yes     Comment: was heavy drinker 35 years ago, rare use now    Drug use: No     Review of Systems   Unable to perform ROS: Intubated     Objective:     Vitals:    Temp: 97.9 °F (36.6 °C)  Pulse: 104  Rhythm: atrial rhythm, sinus tachycardia  BP: 139/61  MAP (mmHg): 88  Resp: 13  SpO2: 100 %  Oxygen Concentration (%): 40  Vent Mode: Spont  Set Rate: 16 BPM  Vt Set: 500 mL  Pressure Support: 5 cmH20  PEEP/CPAP: 5 cmH20  Peak Airway Pressure: 11 cmH20  Mean Airway Pressure: 7.3 cmH20  Plateau Pressure: 13 cmH20    Temp  Min: 97.6 °F (36.4 °C)  Max: 99.4 °F (37.4 °C)  Pulse  Min: 76  Max: 117  BP  Min: 104/55  Max: 153/70  MAP (mmHg)  Min: 75  Max: 106  Resp  Min: 11  Max: 21  SpO2  Min: 91 %  Max: 100 %  Oxygen  Concentration (%)  Min: 40  Max: 50    10/18 0701 - 10/19 0700  In: 3185.3 [I.V.:1935.3]  Out: 2680 [Urine:2275; Drains:105]            Physical Exam  Vitals and nursing note reviewed.   Constitutional:       Comments: Not responsive to verbal or physical stimuli   HENT:      Head: Normocephalic and atraumatic.      Right Ear: External ear normal.      Left Ear: External ear normal.      Nose: Nose normal.      Mouth/Throat:      Mouth: Mucous membranes are dry.      Pharynx: Oropharynx is clear.   Eyes:      Extraocular Movements: Extraocular movements intact.      Conjunctiva/sclera: Conjunctivae normal.   Cardiovascular:      Rate and Rhythm: Tachycardia present. Rhythm irregular.   Pulmonary:      Effort: No respiratory distress.      Comments: Intubated  Abdominal:      General: There is no distension.      Palpations: Abdomen is soft.      Tenderness: There is no abdominal tenderness. There is no guarding.   Musculoskeletal:      Right lower leg: No edema.      Left lower leg: No edema.      Comments: Can not assess muscle movement given his current status   Skin:     General: Skin is warm and dry.      Capillary Refill: Capillary refill takes less than 2 seconds.      Findings: Bruising present.   Neurological:      Comments: Can not perform full neurologic exam as the patient is not responding to any verbal or physical stimuli.  He is off all sedation   Psychiatric:      Comments: Can not assess given his current mental status       Unable to test orientation, language, memory, judgment, insight, fund of knowledge, coordination, gait due to level of consciousness.       Today I personally reviewed pertinent medications, lines/drains/airways, imaging, cardiology results, laboratory results, microbiology results, notably:    PT 12.9 from 27.3, INR 1.2 from 2.6  aPTT 27 from 47    Cr 2.2 from 1.8   BUN 31  Na 132 and , corrected Na 138  Beta hydroxybutyrate 0.2, UA negative for ketones   HbA1c  9.9%    CTH 10/17/24 @ 1948  Large 5 cm intraparenchymal hemorrhage centered in the right frontal lobe. Additional small amount of acute subdural hemorrhage seen along the right aspect of the interhemispheric falx. Apparent small subarachnoid component of hemorrhage is also seen with small amount of layering blood also seen within the posterior horns of the lateral ventricles.     Assessment/Plan:     Neuro  * Traumatic right-sided intracerebral hemorrhage without loss of consciousness  83M PMHx of HTN, HLD, DM2, CAD s/p CABG x2, Afib on Coumadin, ILD on 4L of O2, HFrEF (45%), presenting as a transfer for a large traumatic R frontal IPH reversed with Kcentra and vitamin K with repeat INR 1.2.    Admit to [facility]  q1h neuro checks, vitals, and I&O's  CBC, CMP, mag, and phos daily   Coags, TSH, A1c, lipid panel, UA  Echo, EKG, CXR  Hold AC/AP  4h interval CTH   Given unwitnessed fall, may consider MRI/MRA for further evaluation and would like to avoid CTA with current LEANDRO on CKD  SBP <140  Na goals > 145.  PRN boluses of 3% hypertonic saline if sodium less than 145  Keppra 500mg BID x7d   Neurosurgery consulted   NPO   SCDs; hold chemical VTE ppx in acute setting   PT/OT/SLP      Psychiatric  Major depressive disorder, recurrent episode, mild  Home SSRI once able to take p.o.    Pulmonary  ILD (interstitial lung disease)  ILD on 4L of O2    SpO2 goal > 88%  Duo nebs and tiotropium  Currently intubated    Cardiac/Vascular  Chronic combined systolic and diastolic heart failure  06/29/2024 echo with EF 45%, was previously 30-40%    Repeat echo shows his EF is still 45%  Holding maintenance fluids in the setting of starting enteral feeds    Persistent atrial fibrillation  In atrial fibrillation on admit  Hold Coumadin in setting of acute intraparenchymal hemorrhage    Hyperlipidemia LDL goal <100  Lipid panel  Atorvastatin    Coronary artery disease  S/p CABG x2 in 2004  Patient has reportedly not been on Plavix recently  because of his Coumadin      Benign hypertension with chronic kidney disease, stage III  SBP goal < 140  Cardene gtt   Prn labetalol and hydralazine   Coreg increased to 6.25      Renal/  LEANDRO (acute kidney injury)  LEANDRO on CKD, Cr 1.8 on initial presentation to ED and now 2.2    Avoid nephrotoxic agents  Renally dose medications  Q 1 hour I&Os  IVF discontinued since tube feeds starting    Chronic kidney disease, stage 3a  Cr 1.8 on initial presentation to ED and now 2.2    Avoid nephrotoxic agents  Renally dose medications  Q 1 hour I&Os  See LEANDRO  Starting to trend down, now 1.9    Endocrine  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 Beta hydroxybutyrate and urine ketones negative. Serum CO2 22. Given SQ insulin at OSH.  [place] [facility] [place] A1c 9.9% on admit.     Insulin gtt.  Plan to transition to subcutaneous insulin tomorrow  Gentle IVF given HFrEF  Serum osmolality pending     Other  Obstructive sleep apnea treated with BiPAP  Hold off on BiPAP as patient is not alert enough to be able to remove mask/concern for aspiration risk   Daily ABG          The patient is being Prophylaxed for:  Venous Thromboembolism with: Mechanical  Stress Ulcer with: H2B  Ventilator Pneumonia with: none    Activity Orders            Turn patient starting at 10/18 0000    Diet NPO Except for: Medication: NPO starting at 10/17 2318          Full Code    Abraham Jeffery MD  Neurocritical Care  [facility] - Neuro Critical Care

## 2024-10-19 NOTE — NURSING
Pt. Back up from surgery.  Respiratory at bedside to place patient back on ventilator.  NP Ray at bedside.  Report given at bedside by CRNA and RN from OR.  Plan for post op CT head, SDD to thumbprint suction.  SGD to full suction. Lay flat for now. VSS.  WCTM.

## 2024-10-19 NOTE — ASSESSMENT & PLAN NOTE
06/29/2024 echo with EF 45%, was previously 30-40%    Repeat echo shows his EF is still 45%  Holding maintenance fluids in the setting of starting enteral feeds

## 2024-10-19 NOTE — ANESTHESIA PROCEDURE NOTES
Intubation    Date/Time: 10/18/2024 7:47 PM    Performed by: Amaya Yung CRNA  Authorized by: Saman Nicholas MD    Intubation:     Induction:  Intravenous    Intubated:  Postinduction    Mask Ventilation:  Very difficult with oral airway    Attempts:  1    Attempted By:  Student    Method of Intubation:  Video laryngoscopy    Blade:  Gomes 3    Laryngeal View Grade: Grade I - full view of cords      Difficult Airway Encountered?: Yes      Complications:  None    Airway Device:  Oral endotracheal tube    Airway Device Size:  7.5    Style/Cuff Inflation:  Cuffed (inflated to minimal occlusive pressure)    Tube secured:  24    Secured at:  The lips    Placement Verified By:  Capnometry    Complicating Factors:  None and poor neck/head extension    Findings Post-Intubation:  BS equal bilateral and atraumatic/condition of teeth unchanged

## 2024-10-19 NOTE — SUBJECTIVE & OBJECTIVE
Interval History: 10/19/24: POD 1 for R SDH evac. No acute events overnight. Intubated since procedure, does not open eyes but is localizing and WD. Brainstems intact, pupils reactive. SG HV x 1 to FS, SD anshul (thumbprint), 10cc and 40cc output.     Medications:  Continuous Infusions:   D5 and 0.45% NaCl   Intravenous Continuous PRN        insulin regular 1 units/mL infusion orderable (DKA)  0-52 Units/hr Intravenous Continuous 1.1 mL/hr at 10/19/24 0605 1.1 Units/hr at 10/19/24 0605    nicardipine  0-15 mg/hr Intravenous Continuous 37.5 mL/hr at 10/19/24 0605 7.5 mg/hr at 10/19/24 0605     Scheduled Meds:   atorvastatin  40 mg Oral Daily    carvediloL  3.125 mg Oral BID WM    ceFAZolin (Ancef) IV (PEDS and ADULTS)  2 g Intravenous Q12H    levETIRAcetam (Keppra) IV (PEDS and ADULTS)  500 mg Intravenous Q12H    mupirocin   Nasal BID    polyethylene glycol  17 g Oral Daily    senna-docusate 8.6-50 mg  1 tablet Oral BID    tiotropium bromide  2 puff Inhalation Daily    valsartan  40 mg Oral Daily     PRN Meds:  Current Facility-Administered Medications:     acetaminophen, 650 mg, Oral, Q6H PRN    dextrose 10%, 12.5 g, Intravenous, PRN    dextrose 10%, 25 g, Intravenous, PRN    D5 and 0.45% NaCl, , Intravenous, Continuous PRN    hydrALAZINE, 10 mg, Intravenous, Q6H PRN    labetalol, 10 mg, Intravenous, Q6H PRN    magnesium oxide, 800 mg, Oral, PRN    magnesium oxide, 800 mg, Oral, PRN    ondansetron, 4 mg, Intravenous, Q8H PRN    potassium bicarbonate, 35 mEq, Oral, PRN    potassium bicarbonate, 50 mEq, Oral, PRN    potassium bicarbonate, 60 mEq, Oral, PRN    potassium, sodium phosphates, 2 packet, Oral, PRN    potassium, sodium phosphates, 2 packet, Oral, PRN    potassium, sodium phosphates, 2 packet, Oral, PRN    sodium chloride 0.9%, 10 mL, Intravenous, PRN     Review of Systems  Objective:     Weight: 83.5 kg (184 lb)  Body mass index is 27.98 kg/m².  Vital Signs (Most Recent):  Temp: 97.6 °F (36.4 °C) (10/19/24  "0701)  Pulse: 99 (10/19/24 0752)  Resp: 20 (10/19/24 0752)  BP: 114/69 (10/19/24 0752)  SpO2: 100 % (10/19/24 0752) Vital Signs (24h Range):  Temp:  [97.6 °F (36.4 °C)-99.4 °F (37.4 °C)] 97.6 °F (36.4 °C)  Pulse:  [] 99  Resp:  [15-21] 20  SpO2:  [91 %-100 %] 100 %  BP: (104-153)/(55-76) 114/69  Arterial Line BP: (117-184)/(46-74) 127/59                Vent Mode: A/C  Oxygen Concentration (%):  [50] 50  Resp Rate Total:  [17 br/min-19 br/min] 19 br/min  Vt Set:  [500 mL] 500 mL  PEEP/CPAP:  [5 cmH20] 5 cmH20  Mean Airway Pressure:  [9.1 cmH20-10 cmH20] 10 cmH20             Closed/Suction Drain 10/19/24 Tube - 1 Right Scalp Accordion 10 Fr. (Active)   Site Description Unable to view 10/19/24 0505   Dressing Type Gauze 10/19/24 0505   Dressing Status Clean;Dry;Intact 10/19/24 0505   Dressing Intervention First dressing 10/19/24 0505   Output (mL) 30 mL 10/19/24 0605            Closed/Suction Drain 10/19/24 Tube - 2 Right Scalp Bulb 10 Fr. (Active)   Dressing Type Gauze 10/19/24 0505   Dressing Status Clean;Dry;Intact 10/19/24 0505   Dressing Intervention First dressing 10/19/24 0505   Drainage None 10/19/24 0505   Status To bulb suction 10/19/24 0505   Output (mL) 25 mL 10/19/24 0605            Urethral Catheter 10/17/24 2012 Straight-tip 16 Fr. (Active)   Site Assessment Clean;Intact 10/19/24 0505   Collection Container Urimeter 10/19/24 0505   Securement Method secured to top of thigh w/ adhesive device 10/19/24 0505   Catheter Care Performed yes 10/19/24 0505   Reason for Continuing Urinary Catheterization Critically ill in ICU and requiring hourly monitoring of intake/output 10/19/24 0505   CAUTI Prevention Bundle Securement Device in place with 1" slack;Intact seal between catheter & drainage tubing;Drainage bag/urimeter off the floor;Sheeting clip in use;No dependent loops or kinks;Drainage bag/urimeter not overfilled (<2/3 full);Drainage bag/urimeter below bladder 10/19/24 0305   Output (mL) 200 mL " 10/19/24 0605          Physical Exam         Neurosurgery Physical Exam    [id number]  PERRL  +c/g/c  Loc BUE  WD BLE    Significant Labs:  Recent Labs   Lab 10/18/24  0054 10/18/24  0319 10/18/24  0911 10/18/24  1547 10/18/24  1731 10/19/24  0158 10/19/24  0607   *  443* 392* 148* 265*  --  156*  155*  --    *  134* 137 139 136 140 145  145 148*   K 5.2*  5.2* 4.3 4.1 5.2*  --  3.6  3.6  --      102 103 108 108  --  120*  118*  --    CO2 22*  22* 25 23 21*  --  17*  17*  --    BUN 29*  29* 30* 26* 26*  --  29*  29*  --    CREATININE 2.0*  2.0* 1.7* 1.4 1.6*  --  1.8*  1.8*  --    CALCIUM 8.9  8.9 8.6* 8.4* 8.6*  --  7.7*  7.7*  --    MG 2.2 2.2  --   --   --  2.2  --      Recent Labs   Lab 10/17/24  1928 10/17/24  1934 10/18/24  0054 10/19/24  0158   WBC 9.24  --  11.72 10.13   HGB 11.9*  --  11.6* 8.7*   HCT 37.0* 37 34.5* 28.2*     --  189 173     Recent Labs   Lab 10/17/24  2335 10/18/24  1731 10/19/24  0158   INR 1.2 1.1 1.1   APTT 27.1  --  25.0     Microbiology Results (last 7 days)       Procedure Component Value Units Date/Time    Blood culture [[id number]] Collected: 10/18/24 0335    Order Status: Completed Specimen: Blood from Peripheral, Foot, Right Updated: 10/19/24 0613     Blood Culture, Routine No Growth to date      No Growth to date    Blood culture [[id number]] Collected: 10/18/24 0345    Order Status: Completed Specimen: Blood from Peripheral, Hand, Left Updated: 10/19/24 0613     Blood Culture, Routine No Growth to date      No Growth to date    Culture, Respiratory with Gram Stain [[id number]]     Order Status: No result Specimen: Respiratory           All pertinent labs from the last 24 hours have been reviewed.    Significant Diagnostics:  I have reviewed all pertinent imaging results/findings within the past 24 hours.

## 2024-10-19 NOTE — ANESTHESIA POSTPROCEDURE EVALUATION
Anesthesia Post Evaluation    Patient: Percy Ramirez    Procedure(s) Performed: Procedure(s) (LRB):  CRANIOTOMY, FOR SUBDURAL HEMATOMA EVACUATION (Right)    Final Anesthesia Type: general      Patient location during evaluation: ICU  Patient participation: No - Unable to Participate, Intubation  Level of consciousness: sedated  Post-procedure vital signs: reviewed and stable  Pain management: adequate  Airway patency: patient intubated.  ELINA mitigation strategies: Verification of full reversal of neuromuscular block  PONV status at discharge: No PONV  Anesthetic complications: no      Cardiovascular status: blood pressure returned to baseline and hemodynamically stable  Respiratory status: intubated and ventilator  Hydration status: euvolemic  Follow-up not needed.              Vitals Value Taken Time   /62 10/19/24 0547   Temp 36.5 °C (97.7 °F) 10/19/24 0305   Pulse 98 10/19/24 0555   Resp 19 10/19/24 0555   SpO2 100 % 10/19/24 0555   Vitals shown include unfiled device data.      No case tracking events are documented in the log.      Pain/Tha Score: Pain Rating Prior to Med Admin: 7 (10/18/2024  5:07 PM)

## 2024-10-19 NOTE — ASSESSMENT & PLAN NOTE
Initial presentation c/f HHS w glucose > 600. Beta hydroxybutyrate and urine ketones negative. Serum CO2 22. Given SQ insulin at OSH.  On arrival to [facility] [place], . Hb A1c 9.9% on admit.     Insulin gtt.  Plan to transition to subcutaneous insulin tomorrow  Gentle IVF given HFrEF  Serum osmolality pending

## 2024-10-19 NOTE — PROGRESS NOTES
Pharmacist Renal Dose Adjustment Note    Percy Ramirez is a 83 y.o. male being treated with the medication cefazolin    Patient Data:    Vital Signs (Most Recent):  Temp: 98.8 °F (37.1 °C) (10/18/24 1905)  Pulse: 94 (10/18/24 1905)  Resp: 15 (10/18/24 1905)  BP: 128/63 (10/18/24 1905)  SpO2: (!) 94 % (10/18/24 1905) Vital Signs (72h Range):  Temp:  [97.5 °F (36.4 °C)-101 °F (38.3 °C)]   Pulse:  []   Resp:  [12-26]   BP: (106-197)/()   SpO2:  [87 %-100 %]   Arterial Line BP: (132-184)/(46-74)      Recent Labs   Lab 10/18/24  0319 10/18/24  0911 10/18/24  1547   CREATININE 1.7* 1.4 1.6*     Serum creatinine: 1.6 mg/dL (H) 10/18/24 1547  Estimated creatinine clearance: 38.1 mL/min (A)    Medication:cefazolin 2grams every 8 hours will be changed to cefazolin 2g every 12 hours    Pharmacist's Name: Ramila Saldivar  Pharmacist's Extension: [phone]

## 2024-10-19 NOTE — NURSING
CT completed      Transported to CT with oxygen, ambug, cardiac monitor. 2 Rns, respiratory therapist      Pt back from CT on bedside monitor, and back on ventilator.

## 2024-10-19 NOTE — BRIEF OP NOTE
[facility] - Neuro Critical Care  Brief Operative Note    SUMMARY     Surgery Date: 10/18/2024     Surgeons and Role:     * Laurence Condon MD - Primary     * Bhavya Granados MD - Resident - Assisting        Pre-op Diagnosis:  Subdural hematoma [S06.5XAA]    Post-op Diagnosis:  Post-Op Diagnosis Codes:     * Subdural hematoma [S06.5XAA]    Procedure(s) (LRB):  CRANIOTOMY, FOR SUBDURAL HEMATOMA EVACUATION (Right)    Anesthesia: General    Implants:  Implant Name Type Inv. Item Serial No.  Lot No. LRB No. Used Action   DURAMATRIX ONLAY PLUS 4X5 - [id number]  DURAMATRIX ONLAY PLUS 4X5  [employer] [id number] Right 1 Implanted   SCREW UN3 AXS SD 1.5X4MM - [id number]  SCREW UN3 AXS SD 1.5X4MM  [employer].  Right 21 Implanted   PLATE BONE RIGID 2HOLE .6X12MM - [id number]  PLATE BONE RIGID 2HOLE .6X12MM  [employer].  Right 1 Implanted   PLATE BONE BUR HOLE COVER 10MM - [id number]  PLATE BONE BUR HOLE COVER 10MM  [employer].  Right 1 Implanted   PLATE BONE BUR HOLE COVER 10MM - [id number]  PLATE BONE BUR HOLE COVER 10MM  [employer].  Right 1 Explanted   PLATE CRANIAL UN3 BOX LG 2X2 - [id number]  PLATE CRANIAL UN3 BOX LG 2X2  [employer].  Right 1 Implanted   PLATE BONE 6 HOLE DBL Y SHAPE - [id number]  PLATE BONE 6 HOLE DBL Y SHAPE  [employer].  Right 1 Implanted       Operative Findings: craniotomy for large acute SDH. Large draining veins in subdural space. Subgaleal and subdural drains left.    Estimated Blood Loss: 200 mL    Estimated Blood Loss has been documented.         Specimens:   Specimen (24h ago, onward)       Start     Ordered    10/19/24 0026  Specimen to Pathology, Surgery Neurosurgery  Once        Comments: Pre-op Diagnosis: Subdural hematoma [S06.5XAA]Procedure(s):CRANIOTOMY, FOR SUBDURAL HEMATOMA EVACUATION Number of specimens: 1Name of specimens: 1) Right subdural hematoma     References:    Click here for ordering Quick Tip    Question Answer Comment   Procedure Type: Neurosurgery    Release to patient Immediate        10/19/24 0028    10/18/24 1314  Specimen to Pathology, Surgery Neurosurgery  Once        Comments: Pre-op Diagnosis: Intracranial hemorrhage [I62.9]Procedure(s):CRANIOTOMY, Right supraorbital FOR HEMATOMA EVACUATION Number of specimens: 1Name of specimens: 1. Hematoma - Permanent     References:    Click here for ordering Quick Tip   Question Answer Comment   Procedure Type: Neurosurgery    Release to patient Immediate        10/18/24 1314                    [id number]

## 2024-10-19 NOTE — HOSPITAL COURSE
10/19/2024: POD #1 from both his MI LS as well as his right subdural/intraparenchymal hemorrhage evacuation.  He is still intubated and although sedation is off, he is minimally responsive. Coreg doubled. NG tube placed and tube feeds started. Still on insulin drip.  10/20/2024: POD #2 s/p crani for SDH evacuation. Leukocytosis noted post-op, no accompanying fever/chills. Straight cath x1. Tachycardia responding to IVF boluses. Tube feeds started, will titrate to goal. Insulin gtt continued, plans to transition to subQ tomorrow.  10/21/2021: POD#3. EEG placed this morning, revealing multiple r-sided seizures. Keppra load completed and maintenance dose increased. Concern for LUE no longer WD, stat head CT showing new/increased hyperdensity in resection bed w/o worsening mass effect or MLS. 1L LR given for LEANDRO. Will continue to monitor Na.   10/22/24: POD#4. Addl seizures despite keppra load. Vimpat and propofol started yesterday with significant reduction in number. 1U PRBC transfused overnight. Required levo for short period of time after initiation of propofol.  10/23/24: POD#5.  Patient continues to be hyponatremic.  Free water volume increased.  Patient with total of 11 seizures yesterday.  Vimpat and propofol dosing increased with improvement.  10/24/24: POD#6. NGT adjusted overnight. No additional seizures on current AEM regimen. Platelets and Hgb decreasing, will order peripheral smear to assess for HIT  10/25/2024 POD#7. Addl seizure, Onfi added to AEM regimen. Drains removed by NSGY. Will attempt to wean propofol tomorrow.   10/26/2024 POD#8. MRI complete yx. Weaning propofol today.   10/27/2024 POD#9. Patient began having seizures again overnight following cessation of propofol gtt. Resumed propofol at 10cc/hr for now, increased Keppra dose, and plan to attempt to wean again tomorrow. Persistent hyperglycemia on tube feeds, optimizing insulin regimen. Leukocytosis pending respiratory cultures.  10/28/2024  POD10. No addl seizures overnight. Propofol turned off this morning. Clobazam dosing decreased as well. Resp cultures negative, but will obtain blood and urine cultures. Cont abx. NSGY signing off.   10/29/2024 POD11. No seizures. Clobazam down to 10mg QHS. Infiltrate developing RLL, sputum culture likely due to colonization of resp tract per ID.   10/30/2024 POD12. No seizures. Clobazam discontinued. Abx discontinued, as no significant sputum production and resp status is stable. Discussion with family today - trach/PEG likely tomorrow.   10/31/2024 POD13. Single apneic episode yx evening with no EEG correlation. Spontaneously resolved. To OR today for trach/PEG. EEG cap to be removed.   11/1/24 POD14. Hyperkalemia overnight, resolving with lokelma and albuterol 10mg x1.   11/2/24 POD15. NAEON. Remains stable.  11/3/24 POD16. NAEON. Stable. Increased silodosin and optimized bowel regimen. Trach collar trials daily.  11/4/24 POD17: NAEON. Stable, spontaneously voiding. Free water flushes increased to 400cc QID. Q4 neuro checks. Tolerating trach collar well during the day.   11/5/24 POD 18: NAEON. Stable, tolerating trach collar well. Bowel regimen held this morning 2/2 an episode of diarrhea.   11/6/24 POD 19: Pt became tachypneic overnight requiring use of ventilator instead of trach collar. Pt accepted to [facility], pending insurance approval.   11/7/24 POD 20: Pt had an episode of hypotension today requiring a one time 250 cc NS bolus, coreg discontinued. Insurance denying [facility] approval -- peer to peer being scheduled for either today or tomorrow.   11/08/2024 POD 21: Pt accepted at [facility]; patient will be transferred there today. CXR this morning showing worsening right pleural effusion, 1x dose of Lasix given.

## 2024-10-19 NOTE — PLAN OF CARE
[facility] Care Plan  POC reviewed with Percy Ramirez and family at 1400. Patient and Family verbalized understanding. Questions and concerns addressed. No acute events today. Pt progressing toward goals. Will continue to monitor. See below and flowsheets for full assessment and VS info.     OGT inserted, ok to use  ABG done  Dc'd rivers  Spont vent mode  Cont' Cardene and insulin drips        Is this a stroke patient? no    Neuro:  [place] Coma Scale  Best Eye Response: 2-->(E2) to pain  Best Motor Response: 5-->(M5) localizes pain  Best Verbal Response: 1-->(V1) none  [place] Coma Scale Score: 8  Assessment Qualifiers: patient intubated  Pupil PERRLA: yes     24 hr Temp:  [97.6 °F (36.4 °C)-98.8 °F (37.1 °C)]     CV:   Rhythm: atrial rhythm, sinus tachycardia  BP goals:   SBP < 140  MAP > 65    Resp:      Vent Mode: Spont  Set Rate: 16 BPM  Oxygen Concentration (%): 40  Vt Set: 500 mL  PEEP/CPAP: 5 cmH20  Pressure Support: 5 cmH20    Plan: wean to extubate    GI/:     Diet/Nutrition Received: NPO  Last Bowel Movement: 10/18/24  Voiding Characteristics: external catheter    Intake/Output Summary (Last 24 hours) at 10/19/2024 1759  Last data filed at 10/19/2024 1538  Gross per 24 hour   Intake 1778.59 ml   Output 2335 ml   Net -556.41 ml          Labs/Accuchecks:  Recent Labs   Lab 10/19/24  0158   WBC 10.13   RBC 3.11*   HGB 8.7*   HCT 28.2*         Recent Labs   Lab 10/19/24  0158 10/19/24  0607 10/19/24  1613     145   < > 150*   K 3.6  3.6   < > 3.7   CO2 17*  17*   < > 18*   *  118*   < > 121*   BUN 29*  29*   < > 36*   CREATININE 1.8*  1.8*   < > 2.3*   ALKPHOS 69  --   --    ALT 9*  --   --    AST 19  --   --    BILITOT 0.2  --   --     < > = values in this interval not displayed.      Recent Labs   Lab 10/19/24  0158   INR 1.1   APTT 25.0      Recent Labs   Lab 10/17/24  1928   TROPONINI 0.022       Electrolytes: N/A - electrolytes WDL  Accuchecks: Q1H    Gtts:   D5 and 0.45% NaCl    Intravenous Continuous PRN        insulin regular 1 units/mL infusion orderable (DKA)  0-52 Units/hr Intravenous Continuous 2.4 mL/hr at 10/19/24 1538 2.35 Units/hr at 10/19/24 1538    nicardipine  0-15 mg/hr Intravenous Continuous 50 mL/hr at 10/19/24 1538 10 mg/hr at 10/19/24 1538       LDA/Wounds:    Mykel Risk Assessment  Sensory Perception: 3-->slightly limited  Moisture: 3-->occasionally moist  Activity: 1-->bedfast  Mobility: 2-->very limited  Nutrition: 2-->probably inadequate  Friction and Shear: 3-->no apparent problem  Mykel Score: 14    Is your mykel score 12 or less? no      Nurses Note -- 4 Eyes    Is there altered skin present?  No  Second RN/Staff Member:        Restraints:   Restraint Order  Length of Order:  (in OR)  Date that the current order will : 10/19/24  Time that the current order will : 05  Order Upon Application: Yes    [facility]  Problem: Fall Injury Risk  Goal: Absence of Fall and Fall-Related Injury  Outcome: Progressing     Problem: Infection  Goal: Absence of Infection Signs and Symptoms  Outcome: Progressing     Problem: Adult Inpatient Plan of Care  Goal: Patient-Specific Goal (Individualized)  Outcome: Progressing  Goal: Absence of Hospital-Acquired Illness or Injury  Outcome: Progressing     Problem: Stroke, Intracerebral Hemorrhage  Goal: Optimal Coping  Outcome: Progressing  Goal: Effective Bowel Elimination  Outcome: Progressing  Goal: Optimal Cerebral Tissue Perfusion  Outcome: Progressing

## 2024-10-19 NOTE — PROGRESS NOTES
[facility] - Neuro Critical Care  Neurosurgery  Progress Note    Subjective:     History of Present Illness: Percy Ramirez is a 83 y.o. 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 Pt initially presented to  ED 2 days ago after mechanical fall and was found to have small SDH. Repeat scan was stable and patient was discharged home with outpatient follow up and instructions to hold his blood thinners. He re-presented to  ED yesterday after suffering another unwitnessed fall and family noticing him becoming more lethargic with c/o neck and head pain. Blood glucose >600 at OSH. CTH was indicative of 5cm right frontal IPC with 7mm midline shift. K-centra was given at OSH and pt was transferred to [facility] for higher level of care. Upon arrival pt INR was 1.2. Pt has waxing and waning exam, at times oriented and following commands and at other times lethargic, disoriented, and not responding. Further history limited due to patient's mental status change.    Post-Op Info:  Procedure(s) (LRB):  CRANIOTOMY, FOR SUBDURAL HEMATOMA EVACUATION (Right)   1 Day Post-Op   Interval History: 10/19/24: POD 1 for R SDH evac. No acute events overnight. Intubated since procedure, does not open eyes but is localizing and WD. Brainstems intact, pupils reactive. SG HV x 1 to FS, SD anshul (thumbprint), 10cc and 40cc output.     Medications:  Continuous Infusions:   D5 and 0.45% NaCl   Intravenous Continuous PRN        insulin regular 1 units/mL infusion orderable (DKA)  0-52 Units/hr Intravenous Continuous 1.1 mL/hr at 10/19/24 0605 1.1 Units/hr at 10/19/24 0605    nicardipine  0-15 mg/hr Intravenous Continuous 37.5 mL/hr at 10/19/24 0605 7.5 mg/hr at 10/19/24 0605     Scheduled Meds:   atorvastatin  40 mg Oral Daily    carvediloL  3.125 mg Oral BID WM    ceFAZolin (Ancef) IV (PEDS and ADULTS)  2 g Intravenous Q12H    levETIRAcetam  (Keppra) IV (PEDS and ADULTS)  500 mg Intravenous Q12H    mupirocin   Nasal BID    polyethylene glycol  17 g Oral Daily    senna-docusate 8.6-50 mg  1 tablet Oral BID    tiotropium bromide  2 puff Inhalation Daily    valsartan  40 mg Oral Daily     PRN Meds:  Current Facility-Administered Medications:     acetaminophen, 650 mg, Oral, Q6H PRN    dextrose 10%, 12.5 g, Intravenous, PRN    dextrose 10%, 25 g, Intravenous, PRN    D5 and 0.45% NaCl, , Intravenous, Continuous PRN    hydrALAZINE, 10 mg, Intravenous, Q6H PRN    labetalol, 10 mg, Intravenous, Q6H PRN    magnesium oxide, 800 mg, Oral, PRN    magnesium oxide, 800 mg, Oral, PRN    ondansetron, 4 mg, Intravenous, Q8H PRN    potassium bicarbonate, 35 mEq, Oral, PRN    potassium bicarbonate, 50 mEq, Oral, PRN    potassium bicarbonate, 60 mEq, Oral, PRN    potassium, sodium phosphates, 2 packet, Oral, PRN    potassium, sodium phosphates, 2 packet, Oral, PRN    potassium, sodium phosphates, 2 packet, Oral, PRN    sodium chloride 0.9%, 10 mL, Intravenous, PRN     Review of Systems  Objective:     Weight: 83.5 kg (184 lb)  Body mass index is 27.98 kg/m².  Vital Signs (Most Recent):  Temp: 97.6 °F (36.4 °C) (10/19/24 0701)  Pulse: 99 (10/19/24 0752)  Resp: 20 (10/19/24 0752)  BP: 114/69 (10/19/24 0752)  SpO2: 100 % (10/19/24 0752) Vital Signs (24h Range):  Temp:  [97.6 °F (36.4 °C)-99.4 °F (37.4 °C)] 97.6 °F (36.4 °C)  Pulse:  [] 99  Resp:  [15-21] 20  SpO2:  [91 %-100 %] 100 %  BP: (104-153)/(55-76) 114/69  Arterial Line BP: (117-184)/(46-74) 127/59                Vent Mode: A/C  Oxygen Concentration (%):  [50] 50  Resp Rate Total:  [17 br/min-19 br/min] 19 br/min  Vt Set:  [500 mL] 500 mL  PEEP/CPAP:  [5 cmH20] 5 cmH20  Mean Airway Pressure:  [9.1 cmH20-10 cmH20] 10 cmH20             Closed/Suction Drain 10/19/24 Tube - 1 Right Scalp Accordion 10 Fr. (Active)   Site Description Unable to view 10/19/24 0505   Dressing Type Gauze 10/19/24 0505   Dressing Status  "Clean;Dry;Intact 10/19/24 0505   Dressing Intervention First dressing 10/19/24 0505   Output (mL) 30 mL 10/19/24 0605            Closed/Suction Drain 10/19/24 Tube - 2 Right Scalp Bulb 10 Fr. (Active)   Dressing Type Gauze 10/19/24 0505   Dressing Status Clean;Dry;Intact 10/19/24 0505   Dressing Intervention First dressing 10/19/24 0505   Drainage None 10/19/24 0505   Status To bulb suction 10/19/24 0505   Output (mL) 25 mL 10/19/24 0605            Urethral Catheter 10/17/24 2012 Straight-tip 16 Fr. (Active)   Site Assessment Clean;Intact 10/19/24 0505   Collection Container Urimeter 10/19/24 0505   Securement Method secured to top of thigh w/ adhesive device 10/19/24 0505   Catheter Care Performed yes 10/19/24 0505   Reason for Continuing Urinary Catheterization Critically ill in ICU and requiring hourly monitoring of intake/output 10/19/24 0505   CAUTI Prevention Bundle Securement Device in place with 1" slack;Intact seal between catheter & drainage tubing;Drainage bag/urimeter off the floor;Sheeting clip in use;No dependent loops or kinks;Drainage bag/urimeter not overfilled (<2/3 full);Drainage bag/urimeter below bladder 10/19/24 0305   Output (mL) 200 mL 10/19/24 0605          Physical Exam         Neurosurgery Physical Exam    [id number]  PERRL  +c/g/c  Loc BUE  WD BLE    Significant Labs:  Recent Labs   Lab 10/18/24  0054 10/18/24  0319 10/18/24  0911 10/18/24  1547 10/18/24  1731 10/19/24  0158 10/19/24  0607   *  443* 392* 148* 265*  --  156*  155*  --    *  134* 137 139 136 140 145  145 148*   K 5.2*  5.2* 4.3 4.1 5.2*  --  3.6  3.6  --      102 103 108 108  --  120*  118*  --    CO2 22*  22* 25 23 21*  --  17*  17*  --    BUN 29*  29* 30* 26* 26*  --  29*  29*  --    CREATININE 2.0*  2.0* 1.7* 1.4 1.6*  --  1.8*  1.8*  --    CALCIUM 8.9  8.9 8.6* 8.4* 8.6*  --  7.7*  7.7*  --    MG 2.2 2.2  --   --   --  2.2  --      Recent Labs   Lab 10/17/24  1928 10/17/24  1934 " 10/18/24  0054 10/19/24  0158   WBC 9.24  --  11.72 10.13   HGB 11.9*  --  11.6* 8.7*   HCT 37.0* 37 34.5* 28.2*     --  189 173     Recent Labs   Lab 10/17/24  2335 10/18/24  1731 10/19/24  0158   INR 1.2 1.1 1.1   APTT 27.1  --  25.0     Microbiology Results (last 7 days)       Procedure Component Value Units Date/Time    Blood culture [[id number]] Collected: 10/18/24 0335    Order Status: Completed Specimen: Blood from Peripheral, Foot, Right Updated: 10/19/24 0613     Blood Culture, Routine No Growth to date      No Growth to date    Blood culture [[id number]] Collected: 10/18/24 0345    Order Status: Completed Specimen: Blood from Peripheral, Hand, Left Updated: 10/19/24 0613     Blood Culture, Routine No Growth to date      No Growth to date    Culture, Respiratory with Gram Stain [[id number]]     Order Status: No result Specimen: Respiratory           All pertinent labs from the last 24 hours have been reviewed.    Significant Diagnostics:  I have reviewed all pertinent imaging results/findings within the past 24 hours.  Assessment/Plan:     * Traumatic right-sided intracerebral hemorrhage without loss of consciousness  83 y.o. 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 Given K-centra at OSH:    Imaging:  -CTH OSH 10/17: 5cm R frontal ICH with 7mm MLS and some intraventricular blood in posterior lateral vent   -CT Csp OSH 10/17: no acute processes   -rCTH 10/18: grossly stable   -CTA 10/18: post op clot evac with new holoconvexity R SDH, 12 mm MLS   -Post op CTH 10/18: s/p SDH evacuation with appropriate evac, CTA neg     POD 1 for R SDH evac    Plan:  Patient admitted to ICU on telemetry, q1h neuro checks  Post op CTH shows good clot evac  CTA negative for aneurysm or underlying vascular malformation  Hold anti-plt/coag medications. Given K-centra for reversal at OSH. INR 1.2 on arrival  SBP <140 (Cardene gtt; Hydralazine & Labetalol PRN;  Transition to home meds when appropriate)  Na >135  Keppra 500 BID  HOB > 30  WTE plan per NCC  Continue to monitor clinically, notify NSGY immediately with any changes in neuro status    Plan discussed with Dr. Condon    Dispo: admitted to ICU          Sri Pickering MD  Neurosurgery  [facility] - Neuro Critical Care

## 2024-10-19 NOTE — ASSESSMENT & PLAN NOTE
LEANDRO on CKD, Cr 1.8 on initial presentation to ED and now 2.2    Avoid nephrotoxic agents  Renally dose medications  Q 1 hour I&Os  IVF discontinued since tube feeds starting

## 2024-10-19 NOTE — ASSESSMENT & PLAN NOTE
Hold off on BiPAP as patient is not alert enough to be able to remove mask/concern for aspiration risk   Daily ABG

## 2024-10-20 LAB
ALBUMIN SERPL BCP-MCNC: 2.7 G/DL (ref 3.5–5.2)
ALLENS TEST: ABNORMAL
ALP SERPL-CCNC: 75 U/L (ref 40–150)
ALT SERPL W/O P-5'-P-CCNC: 5 U/L (ref 10–44)
ANION GAP SERPL CALC-SCNC: 10 MMOL/L (ref 8–16)
ANION GAP SERPL CALC-SCNC: 12 MMOL/L (ref 8–16)
ANION GAP SERPL CALC-SCNC: 13 MMOL/L (ref 8–16)
AST SERPL-CCNC: 12 U/L (ref 10–40)
BASOPHILS # BLD AUTO: 0.01 K/UL (ref 0–0.2)
BASOPHILS NFR BLD: 0.1 % (ref 0–1.9)
BILIRUB SERPL-MCNC: 0.2 MG/DL (ref 0.1–1)
BUN SERPL-MCNC: 41 MG/DL (ref 8–23)
BUN SERPL-MCNC: 42 MG/DL (ref 8–23)
BUN SERPL-MCNC: 46 MG/DL (ref 8–23)
CALCIUM SERPL-MCNC: 8.1 MG/DL (ref 8.7–10.5)
CHLORIDE SERPL-SCNC: 122 MMOL/L (ref 95–110)
CHLORIDE SERPL-SCNC: 122 MMOL/L (ref 95–110)
CHLORIDE SERPL-SCNC: 124 MMOL/L (ref 95–110)
CO2 SERPL-SCNC: 18 MMOL/L (ref 23–29)
CO2 SERPL-SCNC: 19 MMOL/L (ref 23–29)
CO2 SERPL-SCNC: 19 MMOL/L (ref 23–29)
CREAT SERPL-MCNC: 2.4 MG/DL (ref 0.5–1.4)
CREAT SERPL-MCNC: 2.5 MG/DL (ref 0.5–1.4)
CREAT SERPL-MCNC: 2.6 MG/DL (ref 0.5–1.4)
DELSYS: ABNORMAL
DIFFERENTIAL METHOD BLD: ABNORMAL
EOSINOPHIL # BLD AUTO: 0 K/UL (ref 0–0.5)
EOSINOPHIL NFR BLD: 0 % (ref 0–8)
ERYTHROCYTE [DISTWIDTH] IN BLOOD BY AUTOMATED COUNT: 15.4 % (ref 11.5–14.5)
EST. GFR  (NO RACE VARIABLE): 24 ML/MIN/1.73 M^2
EST. GFR  (NO RACE VARIABLE): 25.2 ML/MIN/1.73 M^2
EST. GFR  (NO RACE VARIABLE): 26.4 ML/MIN/1.73 M^2
FIO2: 40
GLUCOSE SERPL-MCNC: 159 MG/DL (ref 70–110)
GLUCOSE SERPL-MCNC: 167 MG/DL (ref 70–110)
GLUCOSE SERPL-MCNC: 170 MG/DL (ref 70–110)
GLUCOSE SERPL-MCNC: 180 MG/DL (ref 70–110)
GLUCOSE SERPL-MCNC: 198 MG/DL (ref 70–110)
GLUCOSE SERPL-MCNC: 214 MG/DL (ref 70–110)
GLUCOSE SERPL-MCNC: 241 MG/DL (ref 70–110)
GLUCOSE SERPL-MCNC: 286 MG/DL (ref 70–110)
GLUCOSE SERPL-MCNC: 302 MG/DL (ref 70–110)
HCO3 UR-SCNC: 19.6 MMOL/L (ref 24–28)
HCO3 UR-SCNC: 19.8 MMOL/L (ref 24–28)
HCO3 UR-SCNC: 20.3 MMOL/L (ref 24–28)
HCO3 UR-SCNC: 21 MMOL/L (ref 24–28)
HCO3 UR-SCNC: 22.1 MMOL/L (ref 24–28)
HCO3 UR-SCNC: 22.3 MMOL/L (ref 24–28)
HCO3 UR-SCNC: 24.6 MMOL/L (ref 24–28)
HCT VFR BLD AUTO: 28.9 % (ref 40–54)
HCT VFR BLD CALC: 23 %PCV (ref 36–54)
HCT VFR BLD CALC: 23 %PCV (ref 36–54)
HCT VFR BLD CALC: 24 %PCV (ref 36–54)
HCT VFR BLD CALC: 24 %PCV (ref 36–54)
HCT VFR BLD CALC: 27 %PCV (ref 36–54)
HCT VFR BLD CALC: 27 %PCV (ref 36–54)
HCT VFR BLD CALC: 28 %PCV (ref 36–54)
HGB BLD-MCNC: 8.8 G/DL (ref 14–18)
IMM GRANULOCYTES # BLD AUTO: 0.12 K/UL (ref 0–0.04)
IMM GRANULOCYTES NFR BLD AUTO: 0.7 % (ref 0–0.5)
LYMPHOCYTES # BLD AUTO: 0.8 K/UL (ref 1–4.8)
LYMPHOCYTES NFR BLD: 4.7 % (ref 18–48)
MAGNESIUM SERPL-MCNC: 2.4 MG/DL (ref 1.6–2.6)
MCH RBC QN AUTO: 28.7 PG (ref 27–31)
MCHC RBC AUTO-ENTMCNC: 30.4 G/DL (ref 32–36)
MCV RBC AUTO: 94 FL (ref 82–98)
MODE: ABNORMAL
MONOCYTES # BLD AUTO: 1.8 K/UL (ref 0.3–1)
MONOCYTES NFR BLD: 11 % (ref 4–15)
NEUTROPHILS # BLD AUTO: 13.9 K/UL (ref 1.8–7.7)
NEUTROPHILS NFR BLD: 83.5 % (ref 38–73)
NRBC BLD-RTO: 0 /100 WBC
PCO2 BLDA: 38.2 MMHG (ref 35–45)
PCO2 BLDA: 41 MMHG (ref 35–45)
PCO2 BLDA: 42.3 MMHG (ref 35–45)
PCO2 BLDA: 45 MMHG (ref 35–45)
PCO2 BLDA: 45.2 MMHG (ref 35–45)
PCO2 BLDA: 46.5 MMHG (ref 35–45)
PCO2 BLDA: 53 MMHG (ref 35–45)
PEEP: 5
PH SMN: 7.26 [PH] (ref 7.35–7.45)
PH SMN: 7.28 [PH] (ref 7.35–7.45)
PH SMN: 7.28 [PH] (ref 7.35–7.45)
PH SMN: 7.29 [PH] (ref 7.35–7.45)
PH SMN: 7.29 [PH] (ref 7.35–7.45)
PH SMN: 7.3 [PH] (ref 7.35–7.45)
PH SMN: 7.35 [PH] (ref 7.35–7.45)
PHOSPHATE SERPL-MCNC: 3.1 MG/DL (ref 2.7–4.5)
PHOSPHATE SERPL-MCNC: 3.3 MG/DL (ref 2.7–4.5)
PHOSPHATE SERPL-MCNC: 3.3 MG/DL (ref 2.7–4.5)
PLATELET # BLD AUTO: 205 K/UL (ref 150–450)
PMV BLD AUTO: 10.6 FL (ref 9.2–12.9)
PO2 BLDA: 103 MMHG (ref 80–100)
PO2 BLDA: 106 MMHG (ref 80–100)
PO2 BLDA: 121 MMHG (ref 80–100)
PO2 BLDA: 122 MMHG (ref 80–100)
PO2 BLDA: 181 MMHG (ref 80–100)
PO2 BLDA: 234 MMHG (ref 80–100)
PO2 BLDA: 294 MMHG (ref 80–100)
POC BE: -2 MMOL/L
POC BE: -4 MMOL/L
POC BE: -4 MMOL/L
POC BE: -5 MMOL/L
POC BE: -7 MMOL/L
POC IONIZED CALCIUM: 1.09 MMOL/L (ref 1.06–1.42)
POC IONIZED CALCIUM: 1.11 MMOL/L (ref 1.06–1.42)
POC IONIZED CALCIUM: 1.11 MMOL/L (ref 1.06–1.42)
POC IONIZED CALCIUM: 1.12 MMOL/L (ref 1.06–1.42)
POC IONIZED CALCIUM: 1.13 MMOL/L (ref 1.06–1.42)
POC IONIZED CALCIUM: 1.15 MMOL/L (ref 1.06–1.42)
POC IONIZED CALCIUM: 1.15 MMOL/L (ref 1.06–1.42)
POC SATURATED O2: 100 % (ref 95–100)
POC SATURATED O2: 100 % (ref 95–100)
POC SATURATED O2: 97 % (ref 95–100)
POC SATURATED O2: 97 % (ref 95–100)
POC SATURATED O2: 98 % (ref 95–100)
POC SATURATED O2: 99 % (ref 95–100)
POC SATURATED O2: 99 % (ref 95–100)
POC TCO2: 21 MMOL/L (ref 23–27)
POC TCO2: 21 MMOL/L (ref 23–27)
POC TCO2: 22 MMOL/L (ref 23–27)
POC TCO2: 22 MMOL/L (ref 23–27)
POC TCO2: 23 MMOL/L (ref 23–27)
POC TCO2: 24 MMOL/L (ref 23–27)
POC TCO2: 26 MMOL/L (ref 23–27)
POCT GLUCOSE: 108 MG/DL (ref 70–110)
POCT GLUCOSE: 128 MG/DL (ref 70–110)
POCT GLUCOSE: 146 MG/DL (ref 70–110)
POCT GLUCOSE: 149 MG/DL (ref 70–110)
POCT GLUCOSE: 153 MG/DL (ref 70–110)
POCT GLUCOSE: 154 MG/DL (ref 70–110)
POCT GLUCOSE: 155 MG/DL (ref 70–110)
POCT GLUCOSE: 157 MG/DL (ref 70–110)
POCT GLUCOSE: 162 MG/DL (ref 70–110)
POCT GLUCOSE: 162 MG/DL (ref 70–110)
POCT GLUCOSE: 165 MG/DL (ref 70–110)
POCT GLUCOSE: 166 MG/DL (ref 70–110)
POCT GLUCOSE: 166 MG/DL (ref 70–110)
POCT GLUCOSE: 169 MG/DL (ref 70–110)
POCT GLUCOSE: 173 MG/DL (ref 70–110)
POCT GLUCOSE: 174 MG/DL (ref 70–110)
POCT GLUCOSE: 178 MG/DL (ref 70–110)
POCT GLUCOSE: 189 MG/DL (ref 70–110)
POCT GLUCOSE: 191 MG/DL (ref 70–110)
POCT GLUCOSE: 206 MG/DL (ref 70–110)
POCT GLUCOSE: 210 MG/DL (ref 70–110)
POCT GLUCOSE: 249 MG/DL (ref 70–110)
POCT GLUCOSE: 253 MG/DL (ref 70–110)
POCT GLUCOSE: 256 MG/DL (ref 70–110)
POCT GLUCOSE: 256 MG/DL (ref 70–110)
POCT GLUCOSE: 263 MG/DL (ref 70–110)
POTASSIUM BLD-SCNC: 3.6 MMOL/L (ref 3.5–5.1)
POTASSIUM BLD-SCNC: 3.7 MMOL/L (ref 3.5–5.1)
POTASSIUM BLD-SCNC: 3.7 MMOL/L (ref 3.5–5.1)
POTASSIUM BLD-SCNC: 3.9 MMOL/L (ref 3.5–5.1)
POTASSIUM BLD-SCNC: 4.1 MMOL/L (ref 3.5–5.1)
POTASSIUM BLD-SCNC: 4.2 MMOL/L (ref 3.5–5.1)
POTASSIUM BLD-SCNC: 4.7 MMOL/L (ref 3.5–5.1)
POTASSIUM SERPL-SCNC: 4 MMOL/L (ref 3.5–5.1)
POTASSIUM SERPL-SCNC: 4 MMOL/L (ref 3.5–5.1)
POTASSIUM SERPL-SCNC: 4.1 MMOL/L (ref 3.5–5.1)
PROT SERPL-MCNC: 5.9 G/DL (ref 6–8.4)
PS: 5
RBC # BLD AUTO: 3.07 M/UL (ref 4.6–6.2)
SAMPLE: ABNORMAL
SITE: ABNORMAL
SODIUM BLD-SCNC: 135 MMOL/L (ref 136–145)
SODIUM BLD-SCNC: 141 MMOL/L (ref 136–145)
SODIUM BLD-SCNC: 144 MMOL/L (ref 136–145)
SODIUM BLD-SCNC: 145 MMOL/L (ref 136–145)
SODIUM BLD-SCNC: 148 MMOL/L (ref 136–145)
SODIUM BLD-SCNC: 148 MMOL/L (ref 136–145)
SODIUM BLD-SCNC: 155 MMOL/L (ref 136–145)
SODIUM SERPL-SCNC: 152 MMOL/L (ref 136–145)
SODIUM SERPL-SCNC: 152 MMOL/L (ref 136–145)
SODIUM SERPL-SCNC: 153 MMOL/L (ref 136–145)
SODIUM SERPL-SCNC: 153 MMOL/L (ref 136–145)
SODIUM SERPL-SCNC: 154 MMOL/L (ref 136–145)
SODIUM SERPL-SCNC: 154 MMOL/L (ref 136–145)
SP02: 100
SPONT RATE: 13
WBC # BLD AUTO: 16.66 K/UL (ref 3.9–12.7)

## 2024-10-20 PROCEDURE — 63600175 PHARM REV CODE 636 W HCPCS

## 2024-10-20 PROCEDURE — 84100 ASSAY OF PHOSPHORUS: CPT

## 2024-10-20 PROCEDURE — 27200966 HC CLOSED SUCTION SYSTEM

## 2024-10-20 PROCEDURE — 63600175 PHARM REV CODE 636 W HCPCS: Performed by: INTERNAL MEDICINE

## 2024-10-20 PROCEDURE — 94761 N-INVAS EAR/PLS OXIMETRY MLT: CPT

## 2024-10-20 PROCEDURE — 80048 BASIC METABOLIC PNL TOTAL CA: CPT | Mod: 91,XB

## 2024-10-20 PROCEDURE — 36415 COLL VENOUS BLD VENIPUNCTURE: CPT

## 2024-10-20 PROCEDURE — 20000000 HC ICU ROOM

## 2024-10-20 PROCEDURE — 25000003 PHARM REV CODE 250

## 2024-10-20 PROCEDURE — 94003 VENT MGMT INPAT SUBQ DAY: CPT

## 2024-10-20 PROCEDURE — 51798 US URINE CAPACITY MEASURE: CPT

## 2024-10-20 PROCEDURE — 84295 ASSAY OF SERUM SODIUM: CPT | Mod: 91

## 2024-10-20 PROCEDURE — 82803 BLOOD GASES ANY COMBINATION: CPT

## 2024-10-20 PROCEDURE — 51701 INSERT BLADDER CATHETER: CPT

## 2024-10-20 PROCEDURE — 84100 ASSAY OF PHOSPHORUS: CPT | Mod: 91

## 2024-10-20 PROCEDURE — 27100171 HC OXYGEN HIGH FLOW UP TO 24 HOURS

## 2024-10-20 PROCEDURE — 80048 BASIC METABOLIC PNL TOTAL CA: CPT

## 2024-10-20 PROCEDURE — 80053 COMPREHEN METABOLIC PANEL: CPT

## 2024-10-20 PROCEDURE — 99900026 HC AIRWAY MAINTENANCE (STAT)

## 2024-10-20 PROCEDURE — 83735 ASSAY OF MAGNESIUM: CPT

## 2024-10-20 PROCEDURE — 37799 UNLISTED PX VASCULAR SURGERY: CPT

## 2024-10-20 PROCEDURE — 99900035 HC TECH TIME PER 15 MIN (STAT)

## 2024-10-20 PROCEDURE — 25000003 PHARM REV CODE 250: Performed by: PSYCHIATRY & NEUROLOGY

## 2024-10-20 PROCEDURE — 63600175 PHARM REV CODE 636 W HCPCS: Performed by: STUDENT IN AN ORGANIZED HEALTH CARE EDUCATION/TRAINING PROGRAM

## 2024-10-20 PROCEDURE — 99291 CRITICAL CARE FIRST HOUR: CPT | Mod: GC,,, | Performed by: STUDENT IN AN ORGANIZED HEALTH CARE EDUCATION/TRAINING PROGRAM

## 2024-10-20 PROCEDURE — 85025 COMPLETE CBC W/AUTO DIFF WBC: CPT

## 2024-10-20 RX ORDER — CARVEDILOL 12.5 MG/1
12.5 TABLET ORAL 2 TIMES DAILY WITH MEALS
Status: DISCONTINUED | OUTPATIENT
Start: 2024-10-20 | End: 2024-10-23

## 2024-10-20 RX ORDER — CEFAZOLIN 2 G/1
2 INJECTION, POWDER, FOR SOLUTION INTRAMUSCULAR; INTRAVENOUS
Status: DISCONTINUED | OUTPATIENT
Start: 2024-10-21 | End: 2024-10-22

## 2024-10-20 RX ORDER — LABETALOL HCL 20 MG/4 ML
10 SYRINGE (ML) INTRAVENOUS ONCE
Status: COMPLETED | OUTPATIENT
Start: 2024-10-20 | End: 2024-10-20

## 2024-10-20 RX ADMIN — VALSARTAN 40 MG: 40 TABLET, FILM COATED ORAL at 09:10

## 2024-10-20 RX ADMIN — CARVEDILOL 12.5 MG: 12.5 TABLET, FILM COATED ORAL at 05:10

## 2024-10-20 RX ADMIN — CEFAZOLIN 2 G: 2 INJECTION, POWDER, FOR SOLUTION INTRAMUSCULAR; INTRAVENOUS at 06:10

## 2024-10-20 RX ADMIN — SENNOSIDES AND DOCUSATE SODIUM 1 TABLET: 50; 8.6 TABLET ORAL at 09:10

## 2024-10-20 RX ADMIN — CARVEDILOL 6.25 MG: 6.25 TABLET, FILM COATED ORAL at 07:10

## 2024-10-20 RX ADMIN — LABETALOL HYDROCHLORIDE 10 MG: 5 INJECTION, SOLUTION INTRAVENOUS at 12:10

## 2024-10-20 RX ADMIN — HYDRALAZINE HYDROCHLORIDE 10 MG: 20 INJECTION INTRAMUSCULAR; INTRAVENOUS at 03:10

## 2024-10-20 RX ADMIN — LEVETIRACETAM 500 MG: 100 INJECTION INTRAVENOUS at 09:10

## 2024-10-20 RX ADMIN — NICARDIPINE HYDROCHLORIDE 5 MG/HR: 0.2 INJECTION, SOLUTION INTRAVENOUS at 02:10

## 2024-10-20 RX ADMIN — SODIUM CHLORIDE, POTASSIUM CHLORIDE, SODIUM LACTATE AND CALCIUM CHLORIDE 500 ML: 600; 310; 30; 20 INJECTION, SOLUTION INTRAVENOUS at 11:10

## 2024-10-20 RX ADMIN — SODIUM CHLORIDE, POTASSIUM CHLORIDE, SODIUM LACTATE AND CALCIUM CHLORIDE 250 ML: 600; 310; 30; 20 INJECTION, SOLUTION INTRAVENOUS at 05:10

## 2024-10-20 RX ADMIN — INSULIN HUMAN 2.1 UNITS/HR: 1 INJECTION, SOLUTION INTRAVENOUS at 01:10

## 2024-10-20 RX ADMIN — POLYETHYLENE GLYCOL 3350 17 G: 17 POWDER, FOR SOLUTION ORAL at 09:10

## 2024-10-20 RX ADMIN — MUPIROCIN: 20 OINTMENT TOPICAL at 09:10

## 2024-10-20 RX ADMIN — HYDRALAZINE HYDROCHLORIDE 10 MG: 20 INJECTION INTRAMUSCULAR; INTRAVENOUS at 09:10

## 2024-10-20 RX ADMIN — LABETALOL HYDROCHLORIDE 10 MG: 5 INJECTION, SOLUTION INTRAVENOUS at 11:10

## 2024-10-20 RX ADMIN — INSULIN HUMAN 2.4 UNITS/HR: 1 INJECTION, SOLUTION INTRAVENOUS at 08:10

## 2024-10-20 RX ADMIN — FAMOTIDINE 20 MG: 20 TABLET ORAL at 09:10

## 2024-10-20 RX ADMIN — CEFAZOLIN 2 G: 2 INJECTION, POWDER, FOR SOLUTION INTRAMUSCULAR; INTRAVENOUS at 03:10

## 2024-10-20 RX ADMIN — ATORVASTATIN CALCIUM 40 MG: 40 TABLET, FILM COATED ORAL at 09:10

## 2024-10-20 RX ADMIN — LABETALOL HYDROCHLORIDE 10 MG: 5 INJECTION, SOLUTION INTRAVENOUS at 08:10

## 2024-10-20 NOTE — OP NOTE
Craniotomy for Acute Subdural Hematoma (SDH) Evacuation      Procedure performed on the:  Right frontal, temporal, parietal lobe    DATE OF SURGERY:  10/18/2024    SURGEON: Laurence Condon MD, MSc  ASSISTANT:  Bhavya Granados MD-neurosurgery resident assisting    PREOPERATIVE DIAGNOSIS:   Subdural hematoma    POSTOPERATIVE DIAGNOSIS:  Subdural hematoma    PROCEDURES PERFORMED:  1. Craniotomy for evacuation of subdural hematoma  2. Placement of subdural drain    CPT   68857 Craniectomy or craniotomy for evacuation of hematoma, supratentorial, extradural or subdural.    ANESTHESIA: GETA.  ESTIMATED BLOOD LOSS: 200ml  FINDINGS:  Acute subdural hematoma in the right temporal/subtemporal and parietal lobe    DRAINS:  Fully fluted round Raul drain x1, subdural space.  Hemovac drain x1 subgaleal..  COMPLICATIONS:  None.  DISPOSITION:   hemodynamically unchanged, to neuro critical care ICU    INDICATIONS FOR THE PROCEDURE  83-year-old man, previous in recent supraorbital craniotomy for hematoma evacuation for traumatic intraparenchymal hemorrhage.  On acute postop imaging was noted to have a separate subdural hematoma which was not present on previous examination, radiographically or clinically given the radiographic findings decision was made for returns to the OR for craniotomy with evacuation of subdural hematoma.    The patient's family was well apprised of all objectives, benefits, risks and potential complications of the procedure, including but not limited to: worsening of current status, the possible need for further procedures, the risk of infection, headaches, CSF leak, seizures, hemorrhage, stroke, loss of language function, paralysis, coma and even death. Informed consent was obtained and secured in the chart after the patient's family voiced understanding of these risks and decided to proceed with the operation.    DESCRIPTION OF THE PROCEDURE  The patient was transferred to the operating room.  He was  given preoperative prophylactic IV antibiotics.    he patient was already sedated and intubated. Eyes were taped shut after ointment was applied to prevent corneal abrasion. A Kathy Hugger was placed over the exposed lower body to maintain control of core body temperature. A Longoria catheter was inserted.   The patient was placed in the supine position, slightly rotated to the left  well, all pressure points were carefully padded. The hair was clipped in the area where the incision was planned and marked. Pre-prepping was done with alcohol.    OPERATIVE TECHNIQUE  The patient was prepped and draped in the usual sterile fashion. Local anesthesic was infiltrated along the line of the planned reverse question gerald incision.  Temporal-parietal arietal-frontal inverted question gerald incision was performed with a # 10 scalpel blade to the level of the periosteum. The scalp flap, along with the muscle and the periosteum were elevated, reflected anteriorly and held in place utilizing fish hooks. Using a high speed electric drill, michi holes were placed with a  bit in the temporo-parietal-frontal regions. With the B1 bit and footplate, the michi holes were interconnected and a wide craniotomy carried out. Hemostasis was achieved utilizing a combination of bipolar electrocautery, monopolar electrocautery and absorbable gelatin compressed sponge (Gelfoam). The wound was irrigated until clear.   The dura was tense and protruding with a dark blue discoloration. It was coagulated with the bipolar electrocautery and opened in a C-shaped fashion, releasing dark blood and clot that seemed to be under significant pressure. Specimen was sent to pathology. The source of venous bleeding was identified as one cortical vein, located in the inferolateral temporal lobe at the floor of the middle fossa and coagulated with the bipolar electrocautery. The exposed brain was depressed but still pulsating. There were notable areas of  contused brain. Circumferential revision of the field was carried out. No residual oozing blood was visualized and it was copiously irrigated until clear. Hemostasis was obtained utilizing a combination of bipolar electrocautery and absorbable gelatin compressed sponge (Gelfoam). The dura was partially reapproximated utilizing interrupted 4-0 non-absorbable braided polyamide suture (Nurolon) and the cavity irrigated full. Duraplasty of the dural defect was performed with synthetic dural substitute.      The bone flap was replaced and secured utilizing plates and screws. The temporalis muscle was reapproximated utilizing 2-0 polyglactin synthetic absorbable suture (Vicryl). The wound was again irrigated with antibiotic solution until clear. The galea was closed with inverted interrupted stitches utilizing 2)-0 polyglactin synthetic absorbable suture (Vicryl) and the skin approximated utilizing staples. A sterile head dressing was placed over the closed wound.    All needle counts, sponge counts and instrument counts were correct at the end of the case times two. The patient tolerated the procedure well and was transferred to the recovery room in stable condition. Dr. Condon was present during the critical portions of this case.

## 2024-10-20 NOTE — CONSULTS
RD consulted for diet recommendations.  Pt already being followed by RD. Recommendations for best optimization of nutrition status at this time are provided below.    If TF warranted, Diabetisource @ 65 ml/hr to provide 1872 kcals, 94 g of protein, 1276 ml of fluid.  Advance to diabetic diet, texture per SLP when medically warranted  RD to monitor and follow up.     Goals: Meet % EEN/EPN by RD follow up.  Nutrition Goal Status: continues   Communication of RD Recs: other (comment) (POC)

## 2024-10-20 NOTE — PROGRESS NOTES
[facility] Neuro Critical Care  Neurocritical Care  Progress Note    Admit Date: 10/17/2024  Service Date: 10/20/2024  Length of Stay: 3    Subjective:     Chief Complaint: Traumatic right-sided intracerebral hemorrhage without loss of consciousness    History of Present Illness: Percy Ramirez is a 83M PMHx of HTN, HLD, DM2, CAD s/p CABG x2, Afib on Coumadin, ILD on 4L of O2, HFrEF (45%), presenting as a transfer for a large traumatic R frontal IPH. Pt initially presented to [facility] ED yesterday ago after mechanical fall and was found to have 4mm parafalcine SDH. Repeat CTH was stable. Pt was discharged home on keppra and instructed to hold his blood thinners. On 10/17/24 pt was lethargic and fell. He was brought back to  ED. Blood glucose was >600 and he was febrile (101F). CTH revealed 5cm R frontal IPH with 7mm MLS. Kcentra, 10mg vitamin K, and 3% HTS bolus were given. He was transferred to [facility] for higher level of care.     Hospital Course: 10/19/2024: POD #1 from both his MI LS as well as his right subdural/intraparenchymal hemorrhage evacuation.  He is still intubated and although sedation is off, he is minimally responsive. Coreg doubled. NG tube placed and tube feeds started. Still on insulin drip.  10/20/2024: POD #2 s/p crani for SDH evacuation. Leukocytosis noted post-op, no accompanying fever/chills. Straight cath x1. Tachycardia responding to IVF boluses. Tube feeds started, will titrate to goal. Insulin gtt continued, plans to transition to subQ tomorrow.    Interval History:  see hospital course.    Review of Systems   Unable to perform ROS: Intubated     Objective:     Vitals:  Temp: 97.5 °F (36.4 °C)  Pulse: 108  Rhythm: sinus tachycardia  BP: 135/63  MAP (mmHg): 91  Resp: 15  SpO2: 98 %  Oxygen Concentration (%): 40  Vent Mode: Spont  Pressure Support: 5 cmH20  PEEP/CPAP: 5 cmH20  Peak Airway Pressure: 11 cmH20  Mean Airway Pressure: 7 cmH20  Plateau Pressure: 13 cmH20    Temp  Min: 97.5  °F (36.4 °C)  Max: 97.9 °F (36.6 °C)  Pulse  Min: 93  Max: 121  BP  Min: 112/56  Max: 150/72  MAP (mmHg)  Min: 65  Max: 101  Resp  Min: 11  Max: 19  SpO2  Min: 97 %  Max: 100 %  Oxygen Concentration (%)  Min: 40  Max: 40    10/19 0701 - 10/20 0700  In: 1151.1 [I.V.:680]  Out: 779 [Urine:675; Drains:104]            Physical Exam  Vitals and nursing note reviewed.   Constitutional:       General: He is not in acute distress.     Appearance: He is overweight. He is not diaphoretic.      Interventions: He is intubated.   HENT:      Head: Normocephalic and atraumatic.      Right Ear: External ear normal.      Left Ear: External ear normal.      Nose: Nose normal.      Mouth/Throat:      Mouth: Mucous membranes are dry.      Pharynx: Oropharynx is clear.   Eyes:      Extraocular Movements: Extraocular movements intact.      Conjunctiva/sclera: Conjunctivae normal.   Cardiovascular:      Rate and Rhythm: Tachycardia present. Rhythm irregular.   Pulmonary:      Effort: No respiratory distress. He is intubated.      Comments: Intubated on spontaneous MV  Abdominal:      General: There is no distension.      Palpations: Abdomen is soft.      Tenderness: There is no abdominal tenderness. There is no guarding.   Musculoskeletal:      Right lower leg: No edema.      Left lower leg: No edema.      Comments: Can not assess muscle movement given his current status   Skin:     General: Skin is warm and dry.      Capillary Refill: Capillary refill takes less than 2 seconds.      Findings: Bruising present.   Neurological:      Mental Status: He is lethargic.      GCS: GCS eye subscore is 3. GCS verbal subscore is 1. GCS motor subscore is 4.      Comments: Can not perform full neurologic exam as the patient is not responding.  Will occasionally open his eyes to voice or squeeze fingers for family members. He is off all sedation.   Psychiatric:      Comments: Can not assess given his current mental status           Medications:  Continuousinsulin regular 1 units/mL infusion orderable (DKA)    Scheduledatorvastatin, 40 mg, Daily  carvediloL, 12.5 mg, BID WM  [START ON 10/21/2024] ceFAZolin (Ancef) IV (PEDS and ADULTS), 2 g, Q12H  famotidine, 20 mg, Daily  levETIRAcetam (Keppra) IV (PEDS and ADULTS), 500 mg, Q12H  mupirocin, , BID  polyethylene glycol, 17 g, Daily  senna-docusate 8.6-50 mg, 1 tablet, BID  tiotropium bromide, 2 puff, Daily  valsartan, 40 mg, Daily    PRNacetaminophen, 650 mg, Q6H PRN  dextrose 10%, 12.5 g, PRN  dextrose 10%, 25 g, PRN  hydrALAZINE, 10 mg, Q6H PRN  labetalol, 10 mg, Q6H PRN  magnesium oxide, 800 mg, PRN  magnesium oxide, 800 mg, PRN  ondansetron, 4 mg, Q8H PRN  potassium bicarbonate, 35 mEq, PRN  potassium bicarbonate, 50 mEq, PRN  potassium bicarbonate, 60 mEq, PRN  potassium, sodium phosphates, 2 packet, PRN  potassium, sodium phosphates, 2 packet, PRN  potassium, sodium phosphates, 2 packet, PRN      Today I personally reviewed pertinent medications, lines/drains/airways, imaging, laboratory results, notably:    Blood cultures NGTD.    Diet  Diet NPO Except for: Medication  Diet NPO Except for: Medication  Assessment/Plan:     Neuro  * Traumatic right-sided intracerebral hemorrhage without loss of consciousness  83M PMHx of HTN, HLD, DM2, CAD s/p CABG x2, Afib on Coumadin, ILD on 4L of O2, HFrEF (45%), presenting as a transfer for a large traumatic R frontal IPH reversed with Kcentra and vitamin K with repeat INR 1.2.    Admit to [facility]  q1h neuro checks, vitals, and I&O's  CBC, CMP, mag, and phos daily   Coags, TSH, A1c, lipid panel, UA  Echo, EKG, CXR  Hold AC/AP  Given unwitnessed fall, may consider MRI/MRA for further evaluation and would like to avoid CTA with current LEANDRO on CKD  SBP <140  Na goals > 145.  PRN boluses of 3% HTS if sodium less than 145  Keppra 500mg BID x7d   Neurosurgery consulted   NPO   SCDs; hold chemical VTE ppx in acute setting   PT/OT/SLP      Psychiatric  Major  depressive disorder, recurrent episode, mild  Restart home SSRI once able to take p.o.    Pulmonary  ILD (interstitial lung disease)  ILD on 4L of O2    SpO2 goal > 88%  Duo nebs and tiotropium  Currently intubated    Cardiac/Vascular  Chronic combined systolic and diastolic heart failure  06/29/2024 echo with EF 45%, was previously 30-40%    Repeat echo shows his EF is still 45%  Holding maintenance fluids in the setting of starting enteral feeds  Received 750mL total IVF boluses due to tachycardia    Persistent atrial fibrillation  In atrial fibrillation on admit  Hold Coumadin in setting of acute intraparenchymal hemorrhage    Hyperlipidemia LDL goal <100  Lipid panel showed low LDL, low HDL, low total cholesterol  Atorvastatin    Coronary artery disease  S/p CABG x2 in 2004  Patient has reportedly not been on Plavix recently because of his Coumadin      Benign hypertension with chronic kidney disease, stage III  SBP goal < 140  Cardene gtt discontinued  Prn labetalol and hydralazine   Coreg increased to 12.5 BID      Renal/  LEANDRO (acute kidney injury)  LEANDRO on CKD, Cr 1.8 on initial presentation to ED and now 2.2    Avoid nephrotoxic agents  Renally dose medications  Q 1 hour I&Os  IVF discontinued since tube feeds starting    Chronic kidney disease, stage 3a  Cr 1.8 on initial presentation to ED and now 2.2    Avoid nephrotoxic agents  Renally dose medications  Q 1 hour I&Os  See LEANDRO    Endocrine  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 Beta hydroxybutyrate and urine ketones negative. Serum CO2 22. Given SQ insulin at OSH.  [place] [facility] [place]1c 9.9% on admit.     Tube feeds started, will titrate to goal.  Insulin gtt.  Plan to transition to subcutaneous insulin tomorrow  Gentle IVF given HFrEF  Serum osmolality elevated 312.     Other  Obstructive sleep apnea treated with BiPAP  Hold off on BiPAP as patient is not alert enough to be  able to remove mask/concern for aspiration risk   Daily ABG          The patient is being Prophylaxed for:  Venous Thromboembolism with: Mechanical  Stress Ulcer with: H2B  Ventilator Pneumonia with: chlorhexidine oral care    Activity Orders            Elevate HOB 30 (30-45 Degrees) starting at 10/20 1025    Turn patient starting at 10/18 0000    Diet NPO Except for: Medication: NPO starting at 10/17 7348          Full Code    Khris Cote MD  Neurocritical Care  [facility] - Neuro Critical Care

## 2024-10-20 NOTE — ASSESSMENT & PLAN NOTE
SBP goal < 140  Cardene gtt discontinued  Prn labetalol and hydralazine   Coreg increased to 12.5 BID

## 2024-10-20 NOTE — ASSESSMENT & PLAN NOTE
83M PMHx of HTN, HLD, DM2, CAD s/p CABG x2, Afib on Coumadin, ILD on 4L of O2, HFrEF (45%), presenting as a transfer for a large traumatic R frontal IPH reversed with Kcentra and vitamin K with repeat INR 1.2.    Admit to NCC  q1h neuro checks, vitals, and I&O's  CBC, CMP, mag, and phos daily   Coags, TSH, A1c, lipid panel, UA  Echo, EKG, CXR  Hold AC/AP  Given unwitnessed fall, may consider MRI/MRA for further evaluation and would like to avoid CTA with current LEANDRO on CKD  SBP <140  Na goals > 145.  PRN boluses of 3% HTS if sodium less than 145  Keppra 500mg BID x7d   Neurosurgery consulted   NPO   SCDs; hold chemical VTE ppx in acute setting   PT/OT/SLP

## 2024-10-20 NOTE — SUBJECTIVE & OBJECTIVE
Interval History:  see hospital course.    Review of Systems   Unable to perform ROS: Intubated     Objective:     Vitals:  Temp: 97.5 °F (36.4 °C)  Pulse: 108  Rhythm: sinus tachycardia  BP: 135/63  MAP (mmHg): 91  Resp: 15  SpO2: 98 %  Oxygen Concentration (%): 40  Vent Mode: Spont  Pressure Support: 5 cmH20  PEEP/CPAP: 5 cmH20  Peak Airway Pressure: 11 cmH20  Mean Airway Pressure: 7 cmH20  Plateau Pressure: 13 cmH20    Temp  Min: 97.5 °F (36.4 °C)  Max: 97.9 °F (36.6 °C)  Pulse  Min: 93  Max: 121  BP  Min: 112/56  Max: 150/72  MAP (mmHg)  Min: 65  Max: 101  Resp  Min: 11  Max: 19  SpO2  Min: 97 %  Max: 100 %  Oxygen Concentration (%)  Min: 40  Max: 40    10/19 0701 - 10/20 0700  In: 1151.1 [I.V.:680]  Out: 779 [Urine:675; Drains:104]            Physical Exam  Vitals and nursing note reviewed.   Constitutional:       General: He is not in acute distress.     Appearance: He is overweight. He is not diaphoretic.      Interventions: He is intubated.   HENT:      Head: Normocephalic and atraumatic.      Right Ear: External ear normal.      Left Ear: External ear normal.      Nose: Nose normal.      Mouth/Throat:      Mouth: Mucous membranes are dry.      Pharynx: Oropharynx is clear.   Eyes:      Extraocular Movements: Extraocular movements intact.      Conjunctiva/sclera: Conjunctivae normal.   Cardiovascular:      Rate and Rhythm: Tachycardia present. Rhythm irregular.   Pulmonary:      Effort: No respiratory distress. He is intubated.      Comments: Intubated on spontaneous MV  Abdominal:      General: There is no distension.      Palpations: Abdomen is soft.      Tenderness: There is no abdominal tenderness. There is no guarding.   Musculoskeletal:      Right lower leg: No edema.      Left lower leg: No edema.      Comments: Can not assess muscle movement given his current status   Skin:     General: Skin is warm and dry.      Capillary Refill: Capillary refill takes less than 2 seconds.      Findings: Bruising  present.   Neurological:      Mental Status: He is lethargic.      GCS: GCS eye subscore is 3. GCS verbal subscore is 1. GCS motor subscore is 4.      Comments: Can not perform full neurologic exam as the patient is not responding.  Will occasionally open his eyes to voice or squeeze fingers for family members. He is off all sedation.   Psychiatric:      Comments: Can not assess given his current mental status          Medications:  Continuousinsulin regular 1 units/mL infusion orderable (DKA)    Scheduledatorvastatin, 40 mg, Daily  carvediloL, 12.5 mg, BID WM  [START ON 10/21/2024] ceFAZolin (Ancef) IV (PEDS and ADULTS), 2 g, Q12H  famotidine, 20 mg, Daily  levETIRAcetam (Keppra) IV (PEDS and ADULTS), 500 mg, Q12H  mupirocin, , BID  polyethylene glycol, 17 g, Daily  senna-docusate 8.6-50 mg, 1 tablet, BID  tiotropium bromide, 2 puff, Daily  valsartan, 40 mg, Daily    PRNacetaminophen, 650 mg, Q6H PRN  dextrose 10%, 12.5 g, PRN  dextrose 10%, 25 g, PRN  hydrALAZINE, 10 mg, Q6H PRN  labetalol, 10 mg, Q6H PRN  magnesium oxide, 800 mg, PRN  magnesium oxide, 800 mg, PRN  ondansetron, 4 mg, Q8H PRN  potassium bicarbonate, 35 mEq, PRN  potassium bicarbonate, 50 mEq, PRN  potassium bicarbonate, 60 mEq, PRN  potassium, sodium phosphates, 2 packet, PRN  potassium, sodium phosphates, 2 packet, PRN  potassium, sodium phosphates, 2 packet, PRN      Today I personally reviewed pertinent medications, lines/drains/airways, imaging, laboratory results, notably:    Blood cultures NGTD.    Diet  Diet NPO Except for: Medication  Diet NPO Except for: Medication

## 2024-10-20 NOTE — PLAN OF CARE
[facility] Care Plan  POC reviewed with Percy Ramirez and daughter at 0800. Family verbalized understanding. Questions and concerns addressed. No acute events today. Pt progressing toward goals. Will continue to monitor. See below and flowsheets for full assessment and VS info.     -Bladder scan x1  -Straight cath x1  -250mL LR bolus for tachycardia  -tube feeds started @20  -insulin gtt @ 2.1  -cardene @ 5  -bath complete      Is this a stroke patient? no    Neuro:  Lewis Coma Scale  Best Eye Response: 2-->(E2) to pain  Best Motor Response: 5-->(M5) localizes pain  Best Verbal Response: 1-->(V1) none  [place] Coma Scale Score: 8  Assessment Qualifiers: patient intubated  Pupil PERRLA: yes     24 hr Temp:  [97.6 °F (36.4 °C)-97.9 °F (36.6 °C)]     CV:   Rhythm: sinus tachycardia  BP goals:   SBP < 140  MAP > 65    Resp:      Vent Mode: Spont  Set Rate: 16 BPM  Oxygen Concentration (%): 40  Vt Set: 500 mL  PEEP/CPAP: 5 cmH20  Pressure Support: 5 cmH20    Plan:  intubated     GI/:     Diet/Nutrition Received: tube feeding  Last Bowel Movement: 10/18/24  Voiding Characteristics: external catheter    Intake/Output Summary (Last 24 hours) at 10/20/2024 0628  Last data filed at 10/20/2024 0605  Gross per 24 hour   Intake 1151.06 ml   Output 779 ml   Net 372.06 ml          Labs/Accuchecks:  Recent Labs   Lab 10/20/24  0302 10/20/24  0523   WBC 16.66*  --    RBC 3.07*  --    HGB 8.8*  --    HCT 28.9* 23*     --       Recent Labs   Lab 10/20/24  0302   *   K 4.1   CO2 18*   *   BUN 42*   CREATININE 2.4*   ALKPHOS 75   ALT 5*   AST 12   BILITOT 0.2      Recent Labs   Lab 10/19/24  0158   INR 1.1   APTT 25.0      Recent Labs   Lab 10/17/24  1928   TROPONINI 0.022       Electrolytes: N/A - electrolytes WDL  Accuchecks: Q1H    Gtts:   D5 and 0.45% NaCl   Intravenous Continuous PRN        insulin regular 1 units/mL infusion orderable (DKA)  0-52 Units/hr Intravenous Continuous 2.1 mL/hr at 10/20/24 0605 2.1  Units/hr at 10/20/24 0605    nicardipine  0-15 mg/hr Intravenous Continuous 12.5 mL/hr at 10/20/24 0605 2.5 mg/hr at 10/20/24 0605       LDA/Wounds:    Mykel Risk Assessment  Sensory Perception: 3-->slightly limited  Moisture: 3-->occasionally moist  Activity: 1-->bedfast  Mobility: 2-->very limited  Nutrition: 2-->probably inadequate  Friction and Shear: 2-->potential problem  Mykel Score: 13    Is your mykel score 12 or less? no      Nurses Note -- 4 Eyes    Is there altered skin present?  yes  Second RN/Staff Member:  RADHA Da Silva      [facility]

## 2024-10-20 NOTE — ASSESSMENT & PLAN NOTE
06/29/2024 echo with EF 45%, was previously 30-40%    Repeat echo shows his EF is still 45%  Holding maintenance fluids in the setting of starting enteral feeds  Received 750mL total IVF boluses due to tachycardia

## 2024-10-20 NOTE — ASSESSMENT & PLAN NOTE
Initial presentation c/f HHS w glucose > 600. Beta hydroxybutyrate and urine ketones negative. Serum CO2 22. Given SQ insulin at OSH.  On arrival to [facility] [place] . Hb A1c 9.9% on admit.     Tube feeds started, will titrate to goal.  Insulin gtt.  Plan to transition to subcutaneous insulin tomorrow  Gentle IVF given HFrEF  Serum osmolality elevated 312.

## 2024-10-20 NOTE — PLAN OF CARE
[facility] Care Plan  POC reviewed with Percy Ramirez and family at 1400. Family verbalized understanding. Questions and concerns addressed. No acute events today. Pt progressing toward goals. Will continue to monitor. See below and flowsheets for full assessment and VS info.             Is this a stroke patient? no    Neuro:  [place] Coma Scale  Best Eye Response: 3-->(E3) to speech  Best Motor Response: 6-->(M6) obeys commands  Best Verbal Response: 1-->(V1) none  [place] Coma Scale Score: 10  Assessment Qualifiers: patient intubated, no eye obstruction present  Pupil PERRLA: yes     24 hr Temp:  [97.5 °F (36.4 °C)-97.9 °F (36.6 °C)]     CV:   Rhythm: sinus tachycardia  BP goals:   SBP < 140  MAP > 65    Resp:      Vent Mode: Spont  Set Rate: 16 BPM  Oxygen Concentration (%): 40  Vt Set: 500 mL  PEEP/CPAP: 5 cmH20  Pressure Support: 5 cmH20    Plan: wean to extubate    GI/:     Diet/Nutrition Received: tube feeding  Last Bowel Movement: 10/18/24  Voiding Characteristics: external catheter    Intake/Output Summary (Last 24 hours) at 10/20/2024 1614  Last data filed at 10/20/2024 1605  Gross per 24 hour   Intake 1929.09 ml   Output 479 ml   Net 1450.09 ml          Labs/Accuchecks:  Recent Labs   Lab 10/20/24  0302 10/20/24  0523   WBC 16.66*  --    RBC 3.07*  --    HGB 8.8*  --    HCT 28.9* 23*     --       Recent Labs   Lab 10/20/24  0302 10/20/24  0605 10/20/24  0802   *   < > 153*   K 4.1  --  4.0   CO2 18*  --  19*   *  --  124*   BUN 42*  --  46*   CREATININE 2.4*  --  2.6*   ALKPHOS 75  --   --    ALT 5*  --   --    AST 12  --   --    BILITOT 0.2  --   --     < > = values in this interval not displayed.      Recent Labs   Lab 10/19/24  0158   INR 1.1   APTT 25.0      Recent Labs   Lab 10/17/24  1928   TROPONINI 0.022       Electrolytes: N/A - electrolytes WDL  Accuchecks: Q1H    Gtts:   insulin regular 1 units/mL infusion orderable (DKA)  0-52 Units/hr Intravenous Continuous            LDA/Wounds:    Mykel Risk Assessment  Sensory Perception: 3-->slightly limited  Moisture: 3-->occasionally moist  Activity: 1-->bedfast  Mobility: 2-->very limited  Nutrition: 3-->adequate  Friction and Shear: 2-->potential problem  Mykel Score: 14    Is your mykel score 12 or less? no      Nurses Note -- 4 Eyes    Is there altered skin present?  No  Second RN/Staff Member:  RADHA Mendoza      Restraints:   Restraint Order  Length of Order: Order good for next 24 hours or when removed.  Date that the current order will : 10/21/24  Time that the current order will : 805  Order Upon Application: Yes    [facility]

## 2024-10-21 LAB
ALBUMIN SERPL BCP-MCNC: 2.4 G/DL (ref 3.5–5.2)
ALLENS TEST: ABNORMAL
ALP SERPL-CCNC: 82 U/L (ref 40–150)
ALT SERPL W/O P-5'-P-CCNC: <5 U/L (ref 10–44)
ANION GAP SERPL CALC-SCNC: 10 MMOL/L (ref 8–16)
AST SERPL-CCNC: 11 U/L (ref 10–40)
BASOPHILS # BLD AUTO: 0.01 K/UL (ref 0–0.2)
BASOPHILS NFR BLD: 0.1 % (ref 0–1.9)
BILIRUB SERPL-MCNC: 0.2 MG/DL (ref 0.1–1)
BLD PROD TYP BPU: NORMAL
BLD PROD TYP BPU: NORMAL
BLOOD UNIT EXPIRATION DATE: NORMAL
BLOOD UNIT EXPIRATION DATE: NORMAL
BLOOD UNIT TYPE CODE: 7300
BLOOD UNIT TYPE CODE: 7300
BLOOD UNIT TYPE: NORMAL
BLOOD UNIT TYPE: NORMAL
BNP SERPL-MCNC: 238 PG/ML (ref 0–99)
BUN SERPL-MCNC: 57 MG/DL (ref 8–23)
CALCIUM SERPL-MCNC: 8 MG/DL (ref 8.7–10.5)
CHLORIDE SERPL-SCNC: 125 MMOL/L (ref 95–110)
CO2 SERPL-SCNC: 19 MMOL/L (ref 23–29)
CODING SYSTEM: NORMAL
CODING SYSTEM: NORMAL
CREAT SERPL-MCNC: 2.5 MG/DL (ref 0.5–1.4)
CROSSMATCH INTERPRETATION: NORMAL
CROSSMATCH INTERPRETATION: NORMAL
DELSYS: ABNORMAL
DIFFERENTIAL METHOD BLD: ABNORMAL
DISPENSE STATUS: NORMAL
DISPENSE STATUS: NORMAL
EOSINOPHIL # BLD AUTO: 0 K/UL (ref 0–0.5)
EOSINOPHIL NFR BLD: 0 % (ref 0–8)
ERYTHROCYTE [DISTWIDTH] IN BLOOD BY AUTOMATED COUNT: 15.9 % (ref 11.5–14.5)
EST. GFR  (NO RACE VARIABLE): 25.2 ML/MIN/1.73 M^2
FINAL PATHOLOGIC DIAGNOSIS: NORMAL
FIO2: 40
GLUCOSE SERPL-MCNC: 130 MG/DL (ref 70–110)
GROSS: NORMAL
HCO3 UR-SCNC: 22.1 MMOL/L (ref 24–28)
HCT VFR BLD AUTO: 27.1 % (ref 40–54)
HCT VFR BLD CALC: 22 %PCV (ref 36–54)
HGB BLD-MCNC: 8.1 G/DL (ref 14–18)
IMM GRANULOCYTES # BLD AUTO: 0.1 K/UL (ref 0–0.04)
IMM GRANULOCYTES NFR BLD AUTO: 0.8 % (ref 0–0.5)
LYMPHOCYTES # BLD AUTO: 0.8 K/UL (ref 1–4.8)
LYMPHOCYTES NFR BLD: 6 % (ref 18–48)
Lab: NORMAL
MAGNESIUM SERPL-MCNC: 2.6 MG/DL (ref 1.6–2.6)
MCH RBC QN AUTO: 28.4 PG (ref 27–31)
MCHC RBC AUTO-ENTMCNC: 29.9 G/DL (ref 32–36)
MCV RBC AUTO: 95 FL (ref 82–98)
MICROSCOPIC EXAM: NORMAL
MODE: ABNORMAL
MONOCYTES # BLD AUTO: 1.4 K/UL (ref 0.3–1)
MONOCYTES NFR BLD: 10.7 % (ref 4–15)
NEUTROPHILS # BLD AUTO: 10.8 K/UL (ref 1.8–7.7)
NEUTROPHILS NFR BLD: 82.4 % (ref 38–73)
NRBC BLD-RTO: 0 /100 WBC
PCO2 BLDA: 36.1 MMHG (ref 35–45)
PEEP: 5
PH SMN: 7.39 [PH] (ref 7.35–7.45)
PHOSPHATE SERPL-MCNC: 2.2 MG/DL (ref 2.7–4.5)
PLATELET # BLD AUTO: 176 K/UL (ref 150–450)
PMV BLD AUTO: 10.5 FL (ref 9.2–12.9)
PO2 BLDA: 84 MMHG (ref 80–100)
POC BE: -3 MMOL/L
POC IONIZED CALCIUM: 1.13 MMOL/L (ref 1.06–1.42)
POC SATURATED O2: 96 % (ref 95–100)
POC TCO2: 23 MMOL/L (ref 23–27)
POCT GLUCOSE: 114 MG/DL (ref 70–110)
POCT GLUCOSE: 124 MG/DL (ref 70–110)
POCT GLUCOSE: 124 MG/DL (ref 70–110)
POCT GLUCOSE: 136 MG/DL (ref 70–110)
POCT GLUCOSE: 137 MG/DL (ref 70–110)
POCT GLUCOSE: 182 MG/DL (ref 70–110)
POCT GLUCOSE: 188 MG/DL (ref 70–110)
POCT GLUCOSE: 197 MG/DL (ref 70–110)
POCT GLUCOSE: 198 MG/DL (ref 70–110)
POCT GLUCOSE: 209 MG/DL (ref 70–110)
POCT GLUCOSE: 229 MG/DL (ref 70–110)
POCT GLUCOSE: 231 MG/DL (ref 70–110)
POCT GLUCOSE: 233 MG/DL (ref 70–110)
POCT GLUCOSE: 242 MG/DL (ref 70–110)
POCT GLUCOSE: 257 MG/DL (ref 70–110)
POCT GLUCOSE: 264 MG/DL (ref 70–110)
POCT GLUCOSE: 270 MG/DL (ref 70–110)
POCT GLUCOSE: 274 MG/DL (ref 70–110)
POCT GLUCOSE: 275 MG/DL (ref 70–110)
POCT GLUCOSE: 283 MG/DL (ref 70–110)
POCT GLUCOSE: 283 MG/DL (ref 70–110)
POCT GLUCOSE: 287 MG/DL (ref 70–110)
POCT GLUCOSE: 293 MG/DL (ref 70–110)
POCT GLUCOSE: 295 MG/DL (ref 70–110)
POCT GLUCOSE: 317 MG/DL (ref 70–110)
POCT GLUCOSE: 342 MG/DL (ref 70–110)
POTASSIUM BLD-SCNC: 3.7 MMOL/L (ref 3.5–5.1)
POTASSIUM SERPL-SCNC: 3.7 MMOL/L (ref 3.5–5.1)
PROT SERPL-MCNC: 5.6 G/DL (ref 6–8.4)
PS: 5
RBC # BLD AUTO: 2.85 M/UL (ref 4.6–6.2)
SAMPLE: ABNORMAL
SITE: ABNORMAL
SODIUM BLD-SCNC: 158 MMOL/L (ref 136–145)
SODIUM SERPL-SCNC: 153 MMOL/L (ref 136–145)
SODIUM SERPL-SCNC: 153 MMOL/L (ref 136–145)
SODIUM SERPL-SCNC: 154 MMOL/L (ref 136–145)
SODIUM SERPL-SCNC: 155 MMOL/L (ref 136–145)
SODIUM SERPL-SCNC: 156 MMOL/L (ref 136–145)
SP02: 97
SPONT RATE: 13
TRANS ERYTHROCYTES VOL PATIENT: NORMAL ML
TRANS ERYTHROCYTES VOL PATIENT: NORMAL ML
WBC # BLD AUTO: 13.13 K/UL (ref 3.9–12.7)

## 2024-10-21 PROCEDURE — 83735 ASSAY OF MAGNESIUM: CPT

## 2024-10-21 PROCEDURE — 99900035 HC TECH TIME PER 15 MIN (STAT)

## 2024-10-21 PROCEDURE — 95720 EEG PHY/QHP EA INCR W/VEEG: CPT | Mod: ,,, | Performed by: STUDENT IN AN ORGANIZED HEALTH CARE EDUCATION/TRAINING PROGRAM

## 2024-10-21 PROCEDURE — 84295 ASSAY OF SERUM SODIUM: CPT

## 2024-10-21 PROCEDURE — 63600175 PHARM REV CODE 636 W HCPCS

## 2024-10-21 PROCEDURE — 37799 UNLISTED PX VASCULAR SURGERY: CPT

## 2024-10-21 PROCEDURE — 25000003 PHARM REV CODE 250

## 2024-10-21 PROCEDURE — 99900026 HC AIRWAY MAINTENANCE (STAT)

## 2024-10-21 PROCEDURE — 27100171 HC OXYGEN HIGH FLOW UP TO 24 HOURS

## 2024-10-21 PROCEDURE — C9254 INJECTION, LACOSAMIDE: HCPCS

## 2024-10-21 PROCEDURE — 82803 BLOOD GASES ANY COMBINATION: CPT

## 2024-10-21 PROCEDURE — 80053 COMPREHEN METABOLIC PANEL: CPT

## 2024-10-21 PROCEDURE — 63600175 PHARM REV CODE 636 W HCPCS: Performed by: PSYCHIATRY & NEUROLOGY

## 2024-10-21 PROCEDURE — 20000000 HC ICU ROOM

## 2024-10-21 PROCEDURE — 84100 ASSAY OF PHOSPHORUS: CPT

## 2024-10-21 PROCEDURE — 94761 N-INVAS EAR/PLS OXIMETRY MLT: CPT

## 2024-10-21 PROCEDURE — 27200966 HC CLOSED SUCTION SYSTEM

## 2024-10-21 PROCEDURE — C1751 CATH, INF, PER/CENT/MIDLINE: HCPCS

## 2024-10-21 PROCEDURE — 25000003 PHARM REV CODE 250: Performed by: PSYCHIATRY & NEUROLOGY

## 2024-10-21 PROCEDURE — 99291 CRITICAL CARE FIRST HOUR: CPT | Mod: ,,, | Performed by: PSYCHIATRY & NEUROLOGY

## 2024-10-21 PROCEDURE — 83880 ASSAY OF NATRIURETIC PEPTIDE: CPT

## 2024-10-21 PROCEDURE — 94003 VENT MGMT INPAT SUBQ DAY: CPT

## 2024-10-21 PROCEDURE — 84295 ASSAY OF SERUM SODIUM: CPT | Mod: 91

## 2024-10-21 PROCEDURE — 25000003 PHARM REV CODE 250: Performed by: STUDENT IN AN ORGANIZED HEALTH CARE EDUCATION/TRAINING PROGRAM

## 2024-10-21 PROCEDURE — 85025 COMPLETE CBC W/AUTO DIFF WBC: CPT

## 2024-10-21 PROCEDURE — 63600175 PHARM REV CODE 636 W HCPCS: Performed by: STUDENT IN AN ORGANIZED HEALTH CARE EDUCATION/TRAINING PROGRAM

## 2024-10-21 RX ORDER — FAMOTIDINE 20 MG/1
20 TABLET, FILM COATED ORAL DAILY
Status: DISCONTINUED | OUTPATIENT
Start: 2024-10-22 | End: 2024-10-21

## 2024-10-21 RX ORDER — LACOSAMIDE 10 MG/ML
100 INJECTION, SOLUTION INTRAVENOUS EVERY 12 HOURS
Status: DISCONTINUED | OUTPATIENT
Start: 2024-10-22 | End: 2024-10-23

## 2024-10-21 RX ORDER — NICARDIPINE HYDROCHLORIDE 0.2 MG/ML
0-15 INJECTION INTRAVENOUS CONTINUOUS
Status: DISCONTINUED | OUTPATIENT
Start: 2024-10-21 | End: 2024-10-22

## 2024-10-21 RX ORDER — INSULIN ASPART 100 [IU]/ML
4 INJECTION, SOLUTION INTRAVENOUS; SUBCUTANEOUS
Status: DISCONTINUED | OUTPATIENT
Start: 2024-10-21 | End: 2024-10-21

## 2024-10-21 RX ORDER — DEXTROSE MONOHYDRATE 100 MG/ML
INJECTION, SOLUTION INTRAVENOUS CONTINUOUS PRN
Status: DISCONTINUED | OUTPATIENT
Start: 2024-10-21 | End: 2024-10-21

## 2024-10-21 RX ORDER — LORAZEPAM 2 MG/ML
2 INJECTION INTRAMUSCULAR ONCE
Status: COMPLETED | OUTPATIENT
Start: 2024-10-21 | End: 2024-10-21

## 2024-10-21 RX ORDER — FAMOTIDINE 20 MG/1
20 TABLET, FILM COATED ORAL DAILY
Status: DISCONTINUED | OUTPATIENT
Start: 2024-10-22 | End: 2024-11-01

## 2024-10-21 RX ORDER — NOREPINEPHRINE BITARTRATE 0.02 MG/ML
0-3 INJECTION, SOLUTION INTRAVENOUS CONTINUOUS
Status: DISCONTINUED | OUTPATIENT
Start: 2024-10-22 | End: 2024-10-22

## 2024-10-21 RX ORDER — SODIUM,POTASSIUM PHOSPHATES 280-250MG
1 POWDER IN PACKET (EA) ORAL ONCE
Status: COMPLETED | OUTPATIENT
Start: 2024-10-21 | End: 2024-10-21

## 2024-10-21 RX ORDER — LACOSAMIDE 10 MG/ML
200 INJECTION, SOLUTION INTRAVENOUS ONCE
Status: COMPLETED | OUTPATIENT
Start: 2024-10-21 | End: 2024-10-21

## 2024-10-21 RX ORDER — LEVETIRACETAM 500 MG/5ML
1000 INJECTION, SOLUTION, CONCENTRATE INTRAVENOUS EVERY 12 HOURS
Status: CANCELLED | OUTPATIENT
Start: 2024-10-21 | End: 2024-10-25

## 2024-10-21 RX ORDER — GLUCAGON 1 MG
1 KIT INJECTION
Status: DISCONTINUED | OUTPATIENT
Start: 2024-10-21 | End: 2024-10-21

## 2024-10-21 RX ORDER — LEVETIRACETAM 500 MG/5ML
1000 INJECTION, SOLUTION, CONCENTRATE INTRAVENOUS EVERY 12 HOURS
Status: DISCONTINUED | OUTPATIENT
Start: 2024-10-21 | End: 2024-10-27

## 2024-10-21 RX ORDER — INSULIN GLARGINE 100 [IU]/ML
15 INJECTION, SOLUTION SUBCUTANEOUS DAILY
Status: DISCONTINUED | OUTPATIENT
Start: 2024-10-21 | End: 2024-10-21

## 2024-10-21 RX ORDER — INSULIN ASPART 100 [IU]/ML
0-10 INJECTION, SOLUTION INTRAVENOUS; SUBCUTANEOUS EVERY 4 HOURS PRN
Status: DISCONTINUED | OUTPATIENT
Start: 2024-10-21 | End: 2024-10-21

## 2024-10-21 RX ORDER — ACETAMINOPHEN 325 MG/1
650 TABLET ORAL EVERY 6 HOURS PRN
Status: DISCONTINUED | OUTPATIENT
Start: 2024-10-21 | End: 2024-11-01

## 2024-10-21 RX ORDER — LEVETIRACETAM 500 MG/5ML
1000 INJECTION, SOLUTION, CONCENTRATE INTRAVENOUS EVERY 12 HOURS
Status: DISCONTINUED | OUTPATIENT
Start: 2024-10-21 | End: 2024-10-21

## 2024-10-21 RX ADMIN — INSULIN GLARGINE 15 UNITS: 100 INJECTION, SOLUTION SUBCUTANEOUS at 11:10

## 2024-10-21 RX ADMIN — SENNOSIDES AND DOCUSATE SODIUM 1 TABLET: 50; 8.6 TABLET ORAL at 08:10

## 2024-10-21 RX ADMIN — VALSARTAN 40 MG: 40 TABLET, FILM COATED ORAL at 08:10

## 2024-10-21 RX ADMIN — CEFAZOLIN 2 G: 2 INJECTION, POWDER, FOR SOLUTION INTRAMUSCULAR; INTRAVENOUS at 01:10

## 2024-10-21 RX ADMIN — PROPOFOL 30 MCG/KG/MIN: 10 INJECTION, EMULSION INTRAVENOUS at 11:10

## 2024-10-21 RX ADMIN — FAMOTIDINE 20 MG: 20 TABLET ORAL at 08:10

## 2024-10-21 RX ADMIN — INSULIN ASPART 4 UNITS: 100 INJECTION, SOLUTION INTRAVENOUS; SUBCUTANEOUS at 11:10

## 2024-10-21 RX ADMIN — INSULIN HUMAN 1.4 UNITS/HR: 1 INJECTION, SOLUTION INTRAVENOUS at 11:10

## 2024-10-21 RX ADMIN — LEVETIRACETAM 1000 MG: 100 INJECTION INTRAVENOUS at 08:10

## 2024-10-21 RX ADMIN — MUPIROCIN: 20 OINTMENT TOPICAL at 08:10

## 2024-10-21 RX ADMIN — NICARDIPINE HYDROCHLORIDE 2.5 MG/HR: 0.2 INJECTION, SOLUTION INTRAVENOUS at 01:10

## 2024-10-21 RX ADMIN — ATORVASTATIN CALCIUM 40 MG: 40 TABLET, FILM COATED ORAL at 08:10

## 2024-10-21 RX ADMIN — ACETAMINOPHEN 650 MG: 325 TABLET ORAL at 11:10

## 2024-10-21 RX ADMIN — LORAZEPAM 2 MG: 2 INJECTION INTRAMUSCULAR; INTRAVENOUS at 04:10

## 2024-10-21 RX ADMIN — POTASSIUM & SODIUM PHOSPHATES POWDER PACK 280-160-250 MG 1 PACKET: 280-160-250 PACK at 01:10

## 2024-10-21 RX ADMIN — LACOSAMIDE 200 MG: 10 INJECTION INTRAVENOUS at 04:10

## 2024-10-21 RX ADMIN — CARVEDILOL 12.5 MG: 12.5 TABLET, FILM COATED ORAL at 08:10

## 2024-10-21 RX ADMIN — NICARDIPINE HYDROCHLORIDE 5 MG/HR: 0.2 INJECTION, SOLUTION INTRAVENOUS at 05:10

## 2024-10-21 RX ADMIN — MUPIROCIN: 20 OINTMENT TOPICAL at 09:10

## 2024-10-21 RX ADMIN — LEVETIRACETAM 3500 MG: 100 INJECTION INTRAVENOUS at 11:10

## 2024-10-21 RX ADMIN — INSULIN HUMAN 1.5 UNITS/HR: 1 INJECTION, SOLUTION INTRAVENOUS at 03:10

## 2024-10-21 RX ADMIN — CARVEDILOL 12.5 MG: 12.5 TABLET, FILM COATED ORAL at 04:10

## 2024-10-21 RX ADMIN — SODIUM CHLORIDE, POTASSIUM CHLORIDE, SODIUM LACTATE AND CALCIUM CHLORIDE 1000 ML: 600; 310; 30; 20 INJECTION, SOLUTION INTRAVENOUS at 03:10

## 2024-10-21 RX ADMIN — LEVETIRACETAM 500 MG: 100 INJECTION INTRAVENOUS at 08:10

## 2024-10-21 NOTE — PLAN OF CARE
[facility] - Neuro Critical Care  Discharge Reassessment    Primary Care Provider: Kasie Edmondson MD    Expected Discharge Date: 10/22/2024    Reassessment (most recent)       Discharge Reassessment - 10/21/24 1548          Discharge Reassessment    Assessment Type Discharge Planning Reassessment     Did the patient's condition or plan change since previous assessment? No     Communicated TATI with patient/caregiver No     Discharge Plan A Home with family (P)      Discharge Plan B Home (P)      DME Needed Upon Discharge  none (P)      Transition of Care Barriers None (P)      Why the patient remains in the hospital Requires continued medical care (P)         Post-Acute Status    Discharge Delays None known at this time (P)                    Pt is not medically ready to discharge.   SW will continue to monitor pt needs.       Discharge Plan A and Plan B have been determined by review of patient's clinical status, future medical and therapeutic needs, and coverage/benefits for post-acute care in coordination with multidisciplinary team members.    Haleigh Perdomo MSW, LCSW  [facility]  Case Management Dept.

## 2024-10-21 NOTE — SUBJECTIVE & OBJECTIVE
Interval History: POD3. Remains intubated. LOC R, WD L this morning on rounds. NCC reports that he's no longer WD LUE after rounds and obtaining repeat CTH. No longer on EEG.    Medications:  Continuous Infusions:   D10W   Intravenous Continuous PRN        insulin regular 1 units/mL infusion orderable (DKA)  0-52 Units/hr Intravenous Continuous        nicardipine  0-15 mg/hr Intravenous Continuous 12.5 mL/hr at 10/21/24 0605 2.5 mg/hr at 10/21/24 0605     Scheduled Meds:   atorvastatin  40 mg Per OG tube Daily    carvediloL  12.5 mg Per OG tube BID WM    ceFAZolin (Ancef) IV (PEDS and ADULTS)  2 g Intravenous Q12H    [START ON 10/22/2024] famotidine  20 mg Per OG tube Daily    insulin aspart U-100  4 Units Subcutaneous 6 times per day    insulin glargine U-100  15 Units Subcutaneous Daily    levETIRAcetam (Keppra) IV (PEDS and ADULTS)  500 mg Intravenous Q12H    mupirocin   Nasal BID    polyethylene glycol  17 g Per OG tube Daily    senna-docusate 8.6-50 mg  1 tablet Per OG tube BID    valsartan  40 mg Per OG tube Daily     PRN Meds:  Current Facility-Administered Medications:     acetaminophen, 650 mg, Per OG tube, Q6H PRN    D10W, , Intravenous, Continuous PRN    dextrose 10%, 12.5 g, Intravenous, PRN    dextrose 10%, 25 g, Intravenous, PRN    glucagon (human recombinant), 1 mg, Intramuscular, PRN    hydrALAZINE, 10 mg, Intravenous, Q6H PRN    insulin aspart U-100, 0-10 Units, Subcutaneous, Q4H PRN    labetalol, 10 mg, Intravenous, Q6H PRN    ondansetron, 4 mg, Intravenous, Q8H PRN     Review of Systems  Objective:     Weight: 83.5 kg (184 lb)  Body mass index is 27.98 kg/m².  Vital Signs (Most Recent):  Temp: 98.6 °F (37 °C) (10/21/24 0701)  Pulse: 104 (10/21/24 1016)  Resp: 16 (10/21/24 1016)  BP: (!) 142/77 (10/21/24 1016)  SpO2: 98 % (10/21/24 1016) Vital Signs (24h Range):  Temp:  [97.5 °F (36.4 °C)-98.8 °F (37.1 °C)] 98.6 °F (37 °C)  Pulse:  [] 104  Resp:  [11-31] 16  SpO2:  [95 %-100 %] 98 %  BP:  (122-150)/(60-77) 142/77  Arterial Line BP: (108-150)/(44-66) 132/54                Vent Mode: Spont  Oxygen Concentration (%):  [40] 40  Resp Rate Total:  [11 br/min-23 br/min] 18 br/min  PEEP/CPAP:  [5 cmH20] 5 cmH20  Pressure Support:  [5 cmH20] 5 cmH20  Mean Airway Pressure:  [7 cmH20-7.5 cmH20] 7.3 cmH20             Closed/Suction Drain 10/19/24 Tube - 1 Right Scalp Accordion 10 Fr. (Active)   Site Description Unable to view 10/21/24 0505   Dressing Type Gauze 10/21/24 0505   Dressing Status Dry;Clean;Intact 10/21/24 0505   Dressing Intervention Integrity maintained 10/21/24 0505   Drainage Bloody 10/19/24 1701   Status Other (Comment) 10/19/24 1701   Output (mL) 6 mL 10/21/24 0505            Closed/Suction Drain 10/19/24 Tube - 2 Right Scalp Bulb 10 Fr. (Active)   Site Description Unable to view 10/21/24 0505   Dressing Type Gauze 10/21/24 0505   Dressing Status Clean;Dry;Intact 10/21/24 0505   Dressing Intervention Integrity maintained 10/21/24 0505   Drainage None 10/19/24 0505   Status Open to gravity drainage 10/19/24 1701   Output (mL) 30 mL 10/21/24 0505            NG/OG Tube 10/19/24 1200 Right mouth (Active)   Placement Check placement verified by x-ray 10/21/24 0505   Tolerance no signs/symptoms of discomfort 10/21/24 0505   Securement secured to nostril center 10/21/24 0505   Clamp Status/Tolerance unclamped 10/21/24 0505   Suction Setting/Drainage Method suction at the bedside 10/21/24 0505   Insertion Site Appearance no redness, warmth, tenderness, skin breakdown, drainage 10/21/24 0505   Drainage None 10/21/24 0505   Flush/Irrigation flushed w/;water 10/21/24 0505   Feeding Type continuous;by pump 10/21/24 0505   Feeding Action feeding continued 10/21/24 0505   Current Rate (mL/hr) 45 mL/hr 10/20/24 2305   Goal Rate (mL/hr) 45 mL/hr 10/20/24 2305   Intake (mL) 30 mL 10/20/24 1712   Formula Name diabetic isosource 10/21/24 0505   Intake (mL) - Formula Tube Feeding 45 10/21/24 0605       Male  "External Urinary Catheter 10/20/24 1934 (Active)   Collection Container Urimeter 10/21/24 0505   Securement Method secured to top of thigh w/ adhesive device 10/21/24 0505   Skin no redness;no breakdown 10/21/24 0505   Tolerance no signs/symptoms of discomfort 10/21/24 0505          Physical Exam         Neurosurgery Physical Exam  [id number]  PERRL  +c/g/c  Loc RUE/RLE  WD LUE/LLE    Significant Labs:  Recent Labs   Lab 10/20/24  0302 10/20/24  0605 10/20/24  0802 10/20/24  1808 10/21/24  0000 10/21/24  0321 10/21/24  0613   *  --  170*  --   --  130*  --    *   < > 153*   < > 156* 154* 155*   K 4.1  --  4.0  --   --  3.7  --    *  --  124*  --   --  125*  --    CO2 18*  --  19*  --   --  19*  --    BUN 42*  --  46*  --   --  57*  --    CREATININE 2.4*  --  2.6*  --   --  2.5*  --    CALCIUM 8.1*  --  8.1*  --   --  8.0*  --    MG 2.4  --   --   --   --  2.6  --     < > = values in this interval not displayed.     Recent Labs   Lab 10/20/24  0302 10/20/24  0523 10/21/24  0321 10/21/24  0330   WBC 16.66*  --  13.13*  --    HGB 8.8*  --  8.1*  --    HCT 28.9* 23* 27.1* 22*     --  176  --      No results for input(s): "LABPT", "INR", "APTT" in the last 48 hours.  Microbiology Results (last 7 days)       Procedure Component Value Units Date/Time    Blood culture [[id number]] Collected: 10/18/24 0345    Order Status: Completed Specimen: Blood from Peripheral, Hand, Left Updated: 10/21/24 0612     Blood Culture, Routine No Growth to date      No Growth to date      No Growth to date      No Growth to date    Blood culture [[id number]] Collected: 10/18/24 0335    Order Status: Completed Specimen: Blood from Peripheral, Foot, Right Updated: 10/21/24 0612     Blood Culture, Routine No Growth to date      No Growth to date      No Growth to date      No Growth to date    Culture, Respiratory with Gram Stain [[id number]]     Order Status: No result Specimen: Respiratory           All pertinent labs from " the last 24 hours have been reviewed.    Significant Diagnostics:  CT: CT Head Without Contrast    Result Date: 10/21/2024  Evolving operative changes status post right frontal craniotomy and right frontal parenchymal hematoma evacuation and superimposed more recent right temporoparietal craniotomy and subdural hematoma evacuation with subdural drainage catheter placement There is interval increased hyperdensity to the collection within the right frontal lobe and resection cavity suggestive for evolving/increased hematoma.  However mass effect relatively similar to prior with compression of the right frontal horn and leftward midline shift with subfalcine herniation similar. Scattered intraventricular, subarachnoid and subdural hemorrhages relatively similar. Continued slight distension left lateral ventricle without increased sized ventricles to suggest worsening hydrocephalus. Clinical correlation and close follow-up recommended Electronically signed by: Roc Zuñiga DO Date:    10/21/2024 Time:    09:21   MRI: No results found in the last 24 hours.  I have reviewed all pertinent imaging results/findings within the past 24 hours.  I have reviewed and interpreted all pertinent imaging results/findings within the past 24 hours.

## 2024-10-21 NOTE — ASSESSMENT & PLAN NOTE
SBP goal < 140  Cardene gtt restarted  Prn labetalol and hydralazine   Coreg increased to 12.5 BID

## 2024-10-21 NOTE — PROCEDURES
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  Four subclinical seizures are recorded during reviewed portion.    Please hit the EEG button with any clinical activity concerning for seizures and describe what you see.    Detailed report to follow.     Mayra Trammell MD  [facility]   Department of Neurology

## 2024-10-21 NOTE — HOSPITAL COURSE
10/20: POD2. brainstem intact, LOC R, WD L this AM. Intubated, tolerating spontaneous. EEG ordered. LEANDRO, will discuss keppra increase with NCC team.   10/21/2024: POD3. Remains intubated. LOC R, WD L this morning on rounds. NCC reports that he's no longer WD LUE after rounds and obtaining repeat CTH. No longer on EEG.  10/22/2024: Remains intubated with brainstem reflexes intact. Repeat CTH yesterday stable. MRI pending to look for underlying amyloid. No surgery to be offered at this time.  10/23: on EEG with R hemispheric seizures, added vimpat to keppra, on prop at 40.   10/24/2024: Still intubated on prop for exam. Per nursing staff, patient still withdrawing. Having R parietal/temporal seizures through yesterday, further EEG studies pending. Drains put out 30 cc and 10cc, will remove once EEG is off.  10/25/2024: Patient remains on EEG with right epileptogenic activity and severe diffuse encephalopathy. Subgaleal drain output 20cc and subdural drain output 10cc.  10/26: NAEON. Drains removed yesterday. GoC ongoing. MRI w/wo without clear underlying mass. Neuro stable, poor exam  10/27/2024: NAEO. Goals of care with NCC, no surgery going to be offered. Poor exam but remains neuro stable.  10/28/2024: NAEON. AFVSS. Goals of care with NCC, no surgery going to be offered. Poor exam but remains neuro stable. NSGY to sign off after goals of care discussion.

## 2024-10-21 NOTE — PROGRESS NOTES
[facility]  Neurosurgery  Progress Note    Subjective:     History of Present Illness: Percy Ramirez is a 83 y.o. 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 Pt initially presented to  ED 2 days ago after mechanical fall and was found to have small SDH. Repeat scan was stable and patient was discharged home with outpatient follow up and instructions to hold his blood thinners. He re-presented to  ED yesterday after suffering another unwitnessed fall and family noticing him becoming more lethargic with c/o neck and head pain. Blood glucose >600 at OSH. CTH was indicative of 5cm right frontal IPC with 7mm midline shift. K-centra was given at OSH and pt was transferred to [facility] for higher level of care. Upon arrival pt INR was 1.2. Pt has waxing and waning exam, at times oriented and following commands and at other times lethargic, disoriented, and not responding. Further history limited due to patient's mental status change.    Post-Op Info:  Procedure(s) (LRB):  CRANIOTOMY, FOR SUBDURAL HEMATOMA EVACUATION (Right)   2 Days Post-Op   Interval History: 10/20: POD2. brainstem intact, LOC R, WD L this AM. Intubated, tolerating spontaneous. EEG ordered. LEANDRO, will discuss keppra increase with NCC team.     Medications:  Continuous Infusions:   insulin regular 1 units/mL infusion orderable (DKA)  0-52 Units/hr Intravenous Continuous 4 mL/hr at 10/20/24 2205 4 Units/hr at 10/20/24 2205     Scheduled Meds:   atorvastatin  40 mg Per OG tube Daily    carvediloL  12.5 mg Per OG tube BID WM    [START ON 10/21/2024] ceFAZolin (Ancef) IV (PEDS and ADULTS)  2 g Intravenous Q12H    famotidine  20 mg Oral Daily    levETIRAcetam (Keppra) IV (PEDS and ADULTS)  500 mg Intravenous Q12H    mupirocin   Nasal BID    polyethylene glycol  17 g Per OG tube Daily    senna-docusate 8.6-50 mg  1 tablet Per OG  tube BID    tiotropium bromide  2 puff Inhalation Daily    valsartan  40 mg Per OG tube Daily     PRN Meds:  Current Facility-Administered Medications:     acetaminophen, 650 mg, Oral, Q6H PRN    dextrose 10%, 12.5 g, Intravenous, PRN    dextrose 10%, 25 g, Intravenous, PRN    hydrALAZINE, 10 mg, Intravenous, Q6H PRN    labetalol, 10 mg, Intravenous, Q6H PRN    magnesium oxide, 800 mg, Oral, PRN    magnesium oxide, 800 mg, Oral, PRN    ondansetron, 4 mg, Intravenous, Q8H PRN    potassium bicarbonate, 35 mEq, Oral, PRN    potassium bicarbonate, 50 mEq, Oral, PRN    potassium bicarbonate, 60 mEq, Oral, PRN    potassium, sodium phosphates, 2 packet, Oral, PRN    potassium, sodium phosphates, 2 packet, Oral, PRN    potassium, sodium phosphates, 2 packet, Oral, PRN     Review of Systems  Objective:     Weight: 83.5 kg (184 lb)  Body mass index is 27.98 kg/m².  Vital Signs (Most Recent):  Temp: 98.7 °F (37.1 °C) (10/20/24 2305)  Pulse: 108 (10/20/24 2305)  Resp: 19 (10/20/24 2305)  BP: 137/66 (10/20/24 2305)  SpO2: 98 % (10/20/24 2305) Vital Signs (24h Range):  Temp:  [97.5 °F (36.4 °C)-98.8 °F (37.1 °C)] 98.7 °F (37.1 °C)  Pulse:  [] 108  Resp:  [11-19] 19  SpO2:  [97 %-100 %] 98 %  BP: (112-150)/(56-74) 137/66  Arterial Line BP: (123-150)/(54-66) 146/57     Date 10/20/24 0700 - 10/21/24 0659   Shift 3127-7308 3241-6766 7439-4201 24 Hour Total   INTAKE   I.V.(mL/kg) 110.7(1.3) 37.6(0.5)  148.3(1.8)   NG/ 315  665   IV Piggyback 555.1   555.1   Shift Total(mL/kg) 1015.8(12.2) 352.6(4.2)  1368.4(16.4)   OUTPUT   Urine(mL/kg/hr)  500(0.7)  500   Drains  25 34 59   Shift Total(mL/kg)  525(6.3) 34(0.4) 559(6.7)   Weight (kg) 83.5 83.5 83.5 83.5              Vent Mode: Spont  Oxygen Concentration (%):  [40] 40  Resp Rate Total:  [11 br/min-21 br/min] 21 br/min  PEEP/CPAP:  [5 cmH20] 5 cmH20  Pressure Support:  [5 cmH20] 5 cmH20  Mean Airway Pressure:  [7 cmH20-7.3 cmH20] 7.3 cmH20             Closed/Suction Drain  10/19/24 Tube - 1 Right Scalp Accordion 10 Fr. (Active)   Site Description Unable to view 10/20/24 2305   Dressing Type Gauze 10/20/24 2305   Dressing Status Dry;Clean;Intact 10/20/24 2305   Dressing Intervention Integrity maintained 10/20/24 2305   Drainage Bloody 10/19/24 1701   Status Other (Comment) 10/19/24 1701   Output (mL) 30 mL 10/20/24 2305            Closed/Suction Drain 10/19/24 Tube - 2 Right Scalp Bulb 10 Fr. (Active)   Site Description Unable to view 10/20/24 2305   Dressing Type Gauze 10/20/24 2305   Dressing Status Clean;Dry;Intact 10/20/24 2305   Dressing Intervention Integrity maintained 10/20/24 2305   Drainage None 10/19/24 0505   Status Open to gravity drainage 10/19/24 1701   Output (mL) 4 mL 10/20/24 2305            NG/OG Tube 10/19/24 1200 Right mouth (Active)   Placement Check placement verified by x-ray 10/20/24 2305   Tolerance no signs/symptoms of discomfort 10/20/24 2305   Securement secured to nostril center 10/20/24 2305   Clamp Status/Tolerance unclamped 10/20/24 2305   Suction Setting/Drainage Method suction at the bedside 10/20/24 2305   Insertion Site Appearance no redness, warmth, tenderness, skin breakdown, drainage 10/20/24 2305   Drainage None 10/20/24 2305   Flush/Irrigation flushed w/;water 10/20/24 2305   Feeding Type continuous;by pump 10/20/24 2305   Feeding Action feeding continued 10/20/24 2305   Current Rate (mL/hr) 45 mL/hr 10/20/24 2305   Goal Rate (mL/hr) 45 mL/hr 10/20/24 2305   Intake (mL) 30 mL 10/20/24 1712   Formula Name diabetic isosource 10/20/24 2305   Intake (mL) - Formula Tube Feeding 45 10/20/24 2205       Male External Urinary Catheter 10/20/24 1934 (Active)   Collection Container Urimeter 10/20/24 2305   Securement Method secured to top of thigh w/ adhesive device 10/20/24 2305   Skin no redness;no breakdown 10/20/24 2305   Tolerance no signs/symptoms of discomfort 10/20/24 2305          Physical Exam         Neurosurgery Physical  Exam    [id number]  PERRL  +c/g/c  Loc RUE/RLE  WD LUE/LLE    Significant Labs:  Recent Labs   Lab 10/19/24  0158 10/19/24  0607 10/20/24  0027 10/20/24  0302 10/20/24  0605 10/20/24  0802 10/20/24  1808   *  155*   < > 159* 167*  --  170*  --      145   < > 154*  154* 152* 152* 153* 153*   K 3.6  3.6   < > 4.0 4.1  --  4.0  --    *  118*   < > 122* 122*  --  124*  --    CO2 17*  17*   < > 19* 18*  --  19*  --    BUN 29*  29*   < > 41* 42*  --  46*  --    CREATININE 1.8*  1.8*   < > 2.5* 2.4*  --  2.6*  --    CALCIUM 7.7*  7.7*   < > 8.1* 8.1*  --  8.1*  --    MG 2.2  --   --  2.4  --   --   --     < > = values in this interval not displayed.     Recent Labs   Lab 10/19/24  0158 10/20/24  0302 10/20/24  0523   WBC 10.13 16.66*  --    HGB 8.7* 8.8*  --    HCT 28.2* 28.9* 23*    205  --      Recent Labs   Lab 10/19/24  0158   INR 1.1   APTT 25.0     Microbiology Results (last 7 days)       Procedure Component Value Units Date/Time    Blood culture [[id number]] Collected: 10/18/24 0345    Order Status: Completed Specimen: Blood from Peripheral, Hand, Left Updated: 10/20/24 0612     Blood Culture, Routine No Growth to date      No Growth to date      No Growth to date    Blood culture [[id number]] Collected: 10/18/24 0335    Order Status: Completed Specimen: Blood from Peripheral, Foot, Right Updated: 10/20/24 0612     Blood Culture, Routine No Growth to date      No Growth to date      No Growth to date    Culture, Respiratory with Gram Stain [[id number]]     Order Status: No result Specimen: Respiratory           All pertinent labs from the last 24 hours have been reviewed.    Significant Diagnostics:  CT: No results found in the last 24 hours.  MRI: No results found in the last 24 hours.  Assessment/Plan:     * Traumatic right-sided intracerebral hemorrhage without loss of consciousness  83 y.o. male w/ PMH of CABG x2 on Coumadin, HTN, T2DM, HLD who presents with R frontal ICH (ICH  score 2) s/p fall. Given K-centra at OSH.     Taken to OR 10/18 for R frontal IPH evac via eyebrow supraorbital craniotomy. Unfortunately, interval development of large SDH on post op scan, taken back to OR emergently for acute SDH evacuation.     Imaging:  -CTH OSH 10/17: 5cm R frontal ICH with 7mm MLS and some intraventricular blood in posterior lateral vent   -CT Csp OSH 10/17: no acute processes   -rCTH 10/18: grossly stable   -CTA 10/18: post op clot evac with new holoconvexity R SDH, 12 mm MLS   -Post op CTH 10/18: s/p SDH evacuation with appropriate evac, CTA neg     POD 2 for R SDH evac    Plan:  Patient admitted to ICU on telemetry, q1h neuro checks  Hold anti-plt/coag medications. Given K-centra for reversal at OSH. INR 1.2 on arrival  SBP <140   Na >135  Keppra 500 BID, recommend increase to 1g BID given burden of hemorrage  LEANDRO management per NCC  No HOB restrictions  SDD to thumbprint, SG drain to full suction; abx while in place  WTE plan per NCC - tolerating spont  Continue to monitor clinically, notify NSGY immediately with any changes in neuro status    Plan discussed with Dr. Condon    Dispo: admitted to ICU            Bhavya Granados MD  Neurosurgery  [facility]

## 2024-10-21 NOTE — PROGRESS NOTES
[facility]  Neurosurgery  Progress Note    Subjective:     History of Present Illness: Percy Ramirez is a 83 y.o. 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 Pt initially presented to  ED 2 days ago after mechanical fall and was found to have small SDH. Repeat scan was stable and patient was discharged home with outpatient follow up and instructions to hold his blood thinners. He re-presented to  ED yesterday after suffering another unwitnessed fall and family noticing him becoming more lethargic with c/o neck and head pain. Blood glucose >600 at OSH. CTH was indicative of 5cm right frontal IPC with 7mm midline shift. K-centra was given at OSH and pt was transferred to [facility] for higher level of care. Upon arrival pt INR was 1.2. Pt has waxing and waning exam, at times oriented and following commands and at other times lethargic, disoriented, and not responding. Further history limited due to patient's mental status change.    Post-Op Info:  Procedure(s) (LRB):  CRANIOTOMY, FOR SUBDURAL HEMATOMA EVACUATION (Right)   3 Days Post-Op   Interval History: POD3. Remains intubated. LOC R, WD L this morning on rounds. [facility] reports that he's no longer WD LUE after rounds and obtaining repeat CTH. No longer on EEG.    Medications:  Continuous Infusions:   D10W   Intravenous Continuous PRN        insulin regular 1 units/mL infusion orderable (DKA)  0-52 Units/hr Intravenous Continuous        nicardipine  0-15 mg/hr Intravenous Continuous 12.5 mL/hr at 10/21/24 0605 2.5 mg/hr at 10/21/24 0605     Scheduled Meds:   atorvastatin  40 mg Per OG tube Daily    carvediloL  12.5 mg Per OG tube BID WM    ceFAZolin (Ancef) IV (PEDS and ADULTS)  2 g Intravenous Q12H    [START ON 10/22/2024] famotidine  20 mg Per OG tube Daily    insulin aspart U-100  4 Units Subcutaneous 6 times per day    insulin  glargine U-100  15 Units Subcutaneous Daily    levETIRAcetam (Keppra) IV (PEDS and ADULTS)  500 mg Intravenous Q12H    mupirocin   Nasal BID    polyethylene glycol  17 g Per OG tube Daily    senna-docusate 8.6-50 mg  1 tablet Per OG tube BID    valsartan  40 mg Per OG tube Daily     PRN Meds:  Current Facility-Administered Medications:     acetaminophen, 650 mg, Per OG tube, Q6H PRN    D10W, , Intravenous, Continuous PRN    dextrose 10%, 12.5 g, Intravenous, PRN    dextrose 10%, 25 g, Intravenous, PRN    glucagon (human recombinant), 1 mg, Intramuscular, PRN    hydrALAZINE, 10 mg, Intravenous, Q6H PRN    insulin aspart U-100, 0-10 Units, Subcutaneous, Q4H PRN    labetalol, 10 mg, Intravenous, Q6H PRN    ondansetron, 4 mg, Intravenous, Q8H PRN     Review of Systems  Objective:     Weight: 83.5 kg (184 lb)  Body mass index is 27.98 kg/m².  Vital Signs (Most Recent):  Temp: 98.6 °F (37 °C) (10/21/24 0701)  Pulse: 104 (10/21/24 1016)  Resp: 16 (10/21/24 1016)  BP: (!) 142/77 (10/21/24 1016)  SpO2: 98 % (10/21/24 1016) Vital Signs (24h Range):  Temp:  [97.5 °F (36.4 °C)-98.8 °F (37.1 °C)] 98.6 °F (37 °C)  Pulse:  [] 104  Resp:  [11-31] 16  SpO2:  [95 %-100 %] 98 %  BP: (122-150)/(60-77) 142/77  Arterial Line BP: (108-150)/(44-66) 132/54                Vent Mode: Spont  Oxygen Concentration (%):  [40] 40  Resp Rate Total:  [11 br/min-23 br/min] 18 br/min  PEEP/CPAP:  [5 cmH20] 5 cmH20  Pressure Support:  [5 cmH20] 5 cmH20  Mean Airway Pressure:  [7 cmH20-7.5 cmH20] 7.3 cmH20             Closed/Suction Drain 10/19/24 Tube - 1 Right Scalp Accordion 10 Fr. (Active)   Site Description Unable to view 10/21/24 0505   Dressing Type Gauze 10/21/24 0505   Dressing Status Dry;Clean;Intact 10/21/24 0505   Dressing Intervention Integrity maintained 10/21/24 0505   Drainage Bloody 10/19/24 1701   Status Other (Comment) 10/19/24 1701   Output (mL) 6 mL 10/21/24 0505            Closed/Suction Drain 10/19/24 Tube - 2 Right Scalp  Bulb 10 Fr. (Active)   Site Description Unable to view 10/21/24 0505   Dressing Type Gauze 10/21/24 0505   Dressing Status Clean;Dry;Intact 10/21/24 0505   Dressing Intervention Integrity maintained 10/21/24 0505   Drainage None 10/19/24 0505   Status Open to gravity drainage 10/19/24 1701   Output (mL) 30 mL 10/21/24 0505            NG/OG Tube 10/19/24 1200 Right mouth (Active)   Placement Check placement verified by x-ray 10/21/24 0505   Tolerance no signs/symptoms of discomfort 10/21/24 0505   Securement secured to nostril center 10/21/24 0505   Clamp Status/Tolerance unclamped 10/21/24 0505   Suction Setting/Drainage Method suction at the bedside 10/21/24 0505   Insertion Site Appearance no redness, warmth, tenderness, skin breakdown, drainage 10/21/24 0505   Drainage None 10/21/24 0505   Flush/Irrigation flushed w/;water 10/21/24 0505   Feeding Type continuous;by pump 10/21/24 0505   Feeding Action feeding continued 10/21/24 0505   Current Rate (mL/hr) 45 mL/hr 10/20/24 2305   Goal Rate (mL/hr) 45 mL/hr 10/20/24 2305   Intake (mL) 30 mL 10/20/24 1712   Formula Name diabetic isosource 10/21/24 0505   Intake (mL) - Formula Tube Feeding 45 10/21/24 0605       Male External Urinary Catheter 10/20/24 1934 (Active)   Collection Container Urimeter 10/21/24 0505   Securement Method secured to top of thigh w/ adhesive device 10/21/24 0505   Skin no redness;no breakdown 10/21/24 0505   Tolerance no signs/symptoms of discomfort 10/21/24 0505          Physical Exam         Neurosurgery Physical Exam  [id number]  PERRL  +c/g/c  Loc RUE/RLE  WD LUE/LLE    Significant Labs:  Recent Labs   Lab 10/20/24  0302 10/20/24  0605 10/20/24  0802 10/20/24  1808 10/21/24  0000 10/21/24  0321 10/21/24  0613   *  --  170*  --   --  130*  --    *   < > 153*   < > 156* 154* 155*   K 4.1  --  4.0  --   --  3.7  --    *  --  124*  --   --  125*  --    CO2 18*  --  19*  --   --  19*  --    BUN 42*  --  46*  --   --  57*  --   "  CREATININE 2.4*  --  2.6*  --   --  2.5*  --    CALCIUM 8.1*  --  8.1*  --   --  8.0*  --    MG 2.4  --   --   --   --  2.6  --     < > = values in this interval not displayed.     Recent Labs   Lab 10/20/24  0302 10/20/24  0523 10/21/24  0321 10/21/24  0330   WBC 16.66*  --  13.13*  --    HGB 8.8*  --  8.1*  --    HCT 28.9* 23* 27.1* 22*     --  176  --      No results for input(s): "LABPT", "INR", "APTT" in the last 48 hours.  Microbiology Results (last 7 days)       Procedure Component Value Units Date/Time    Blood culture [[id number]] Collected: 10/18/24 0345    Order Status: Completed Specimen: Blood from Peripheral, Hand, Left Updated: 10/21/24 0612     Blood Culture, Routine No Growth to date      No Growth to date      No Growth to date      No Growth to date    Blood culture [[id number]] Collected: 10/18/24 0335    Order Status: Completed Specimen: Blood from Peripheral, Foot, Right Updated: 10/21/24 0612     Blood Culture, Routine No Growth to date      No Growth to date      No Growth to date      No Growth to date    Culture, Respiratory with Gram Stain [[id number]]     Order Status: No result Specimen: Respiratory           All pertinent labs from the last 24 hours have been reviewed.    Significant Diagnostics:  CT: CT Head Without Contrast    Result Date: 10/21/2024  Evolving operative changes status post right frontal craniotomy and right frontal parenchymal hematoma evacuation and superimposed more recent right temporoparietal craniotomy and subdural hematoma evacuation with subdural drainage catheter placement There is interval increased hyperdensity to the collection within the right frontal lobe and resection cavity suggestive for evolving/increased hematoma.  However mass effect relatively similar to prior with compression of the right frontal horn and leftward midline shift with subfalcine herniation similar. Scattered intraventricular, subarachnoid and subdural hemorrhages relatively " similar. Continued slight distension left lateral ventricle without increased sized ventricles to suggest worsening hydrocephalus. Clinical correlation and close follow-up recommended Electronically signed by: oRc Zuñiga DO Date:    10/21/2024 Time:    09:21   MRI: No results found in the last 24 hours.  I have reviewed all pertinent imaging results/findings within the past 24 hours.  I have reviewed and interpreted all pertinent imaging results/findings within the past 24 hours.  Assessment/Plan:     * Traumatic right-sided intracerebral hemorrhage without loss of consciousness  83 y.o. 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 Given K-centra at OSH.     Taken to OR 10/18 for R frontal IPH evac via eyebrow supraorbital craniotomy. Unfortunately, interval development of large SDH on post op scan, taken back to OR emergently for acute SDH evacuation.     Imaging:  -CTH OSH 10/17: 5cm R frontal ICH with 7mm MLS and some intraventricular blood in posterior lateral vent   -CT Csp OSH 10/17: no acute processes   -rCTH 10/18: grossly stable   -CTA 10/18: post op clot evac with new holoconvexity R SDH, 12 mm MLS   -Post op CTH 10/18: s/p SDH evacuation with appropriate evac, CTA neg   -CTH 10/21: new/increased hyperdensity in the resection cavity without worsening mass effect or midline shift     S/p R SDH evac on 10/18    Plan:  Patient admitted to ICU on telemetry, q1h neuro checks  Hold anti-plt/coag medications. Given K-centra for reversal at OSH. INR 1.2 on arrival  SBP <140   Na >135  Keppra 500 BID, recommend increase to 1g BID given burden of hemorrage  LEANDRO management per NCC  Keep drains today  No HOB restrictions  SDD to thumbprint, SG drain to full suction; abx while in place  WTE plan per NCC - tolerating spont  Continue to monitor clinically, notify NSGY immediately with any changes in neuro status    Plan discussed with Dr. Condon    Dispo:  admitted to ICU            Gaurav Velásquez MD  Neurosurgery  [facility]

## 2024-10-21 NOTE — ASSESSMENT & PLAN NOTE
Initial presentation c/f HHS w glucose > 600. Beta hydroxybutyrate and urine ketones negative. Serum CO2 22. Given SQ insulin at OSH.  On arrival to [facility], . Hb A1c 9.9% on admit.     Tube feeds started, will titrate to goal.  Insulin gtt. Tube feed rate adjustments. Will continue drip until BG normalizes.  Serum osmolality elevated 312.

## 2024-10-21 NOTE — SUBJECTIVE & OBJECTIVE
Interval History:  NAEON. EEG was placed this morning revealing R-sided seizures. Keppra load completed. Back on Cardene for SBP goals < 140. Cr plateau'd but still elevated. Giving 1L LR.     Objective:     Vitals:  Temp: 98 °F (36.7 °C)  Pulse: 106  Rhythm: sinus tachycardia  BP: (!) 126/59  MAP (mmHg): 85  CI (L/min/m2): 3.8 L/min/m2  SVI: 38  SVV: 13 %  Resp: 17  SpO2: 98 %  Oxygen Concentration (%): 40  Vent Mode: Spont  Pressure Support: 5 cmH20  PEEP/CPAP: 5 cmH20  Peak Airway Pressure: 11 cmH20  Mean Airway Pressure: 7.3 cmH20  Plateau Pressure: 13 cmH20    Temp  Min: 98 °F (36.7 °C)  Max: 98.8 °F (37.1 °C)  Pulse  Min: 96  Max: 126  BP  Min: 122/63  Max: 150/69  MAP (mmHg)  Min: 82  Max: 99  CI (L/min/m2)  Min: 2.5 L/min/m2  Max: 4.2 L/min/m2  SVI  Min: 24  Max: 38  SVV  Min: 10 %  Max: 14 %  Resp  Min: 13  Max: 31  SpO2  Min: 95 %  Max: 100 %  Oxygen Concentration (%)  Min: 40  Max: 40    10/20 0701 - 10/21 0700  In: 1857.6 [I.V.:277.5]  Out: 595 [Urine:500; Drains:95]   Unmeasured Output  Urine Occurrence: 1  Stool Occurrence: 1  Pad Count: 2        Physical Exam  Constitutional:       General: He is not in acute distress.  HENT:      Head: Normocephalic.   Eyes:      Comments: Pupils not dilated   Cardiovascular:      Comments: Irregularly irregular  Pulmonary:      Effort: No respiratory distress.      Comments: Intubated   Musculoskeletal:         General: Swelling (LUE) present.   Skin:     General: Skin is warm and dry.   Neurological:      Comments: Intubated; arousable with physical stimuli; following commands; moving right extremities spontaneously; no WD of L side             Medications:  Continuousinsulin regular 1 units/mL infusion orderable (DKA), Last Rate: 1.5 Units/hr (10/21/24 1540)  nicardipine, Last Rate: 2.5 mg/hr (10/21/24 0605)    Scheduledatorvastatin, 40 mg, Daily  carvediloL, 12.5 mg, BID WM  ceFAZolin (Ancef) IV (PEDS and ADULTS), 2 g, Q12H  [START ON 10/22/2024] famotidine, 20  mg, Daily  levETIRAcetam (Keppra) IV (PEDS and ADULTS), 1,000 mg, Q12H  mupirocin, , BID  polyethylene glycol, 17 g, Daily  senna-docusate 8.6-50 mg, 1 tablet, BID    PRNacetaminophen, 650 mg, Q6H PRN  dextrose 10%, 12.5 g, PRN  dextrose 10%, 25 g, PRN  hydrALAZINE, 10 mg, Q6H PRN  labetalol, 10 mg, Q6H PRN  ondansetron, 4 mg, Q8H PRN      Today I personally reviewed pertinent medications, laboratory results,     Diet  Diet NPO Except for: Medication  Diet NPO Except for: Medication

## 2024-10-21 NOTE — SUBJECTIVE & OBJECTIVE
Interval History: 10/20: POD2. brainstem intact, LOC R, WD L this AM. Intubated, tolerating spontaneous. EEG ordered. LEANDRO, will discuss keppra increase with NCC team.     Medications:  Continuous Infusions:   insulin regular 1 units/mL infusion orderable (DKA)  0-52 Units/hr Intravenous Continuous 4 mL/hr at 10/20/24 2205 4 Units/hr at 10/20/24 2205     Scheduled Meds:   atorvastatin  40 mg Per OG tube Daily    carvediloL  12.5 mg Per OG tube BID WM    [START ON 10/21/2024] ceFAZolin (Ancef) IV (PEDS and ADULTS)  2 g Intravenous Q12H    famotidine  20 mg Oral Daily    levETIRAcetam (Keppra) IV (PEDS and ADULTS)  500 mg Intravenous Q12H    mupirocin   Nasal BID    polyethylene glycol  17 g Per OG tube Daily    senna-docusate 8.6-50 mg  1 tablet Per OG tube BID    tiotropium bromide  2 puff Inhalation Daily    valsartan  40 mg Per OG tube Daily     PRN Meds:  Current Facility-Administered Medications:     acetaminophen, 650 mg, Oral, Q6H PRN    dextrose 10%, 12.5 g, Intravenous, PRN    dextrose 10%, 25 g, Intravenous, PRN    hydrALAZINE, 10 mg, Intravenous, Q6H PRN    labetalol, 10 mg, Intravenous, Q6H PRN    magnesium oxide, 800 mg, Oral, PRN    magnesium oxide, 800 mg, Oral, PRN    ondansetron, 4 mg, Intravenous, Q8H PRN    potassium bicarbonate, 35 mEq, Oral, PRN    potassium bicarbonate, 50 mEq, Oral, PRN    potassium bicarbonate, 60 mEq, Oral, PRN    potassium, sodium phosphates, 2 packet, Oral, PRN    potassium, sodium phosphates, 2 packet, Oral, PRN    potassium, sodium phosphates, 2 packet, Oral, PRN     Review of Systems  Objective:     Weight: 83.5 kg (184 lb)  Body mass index is 27.98 kg/m².  Vital Signs (Most Recent):  Temp: 98.7 °F (37.1 °C) (10/20/24 2305)  Pulse: 108 (10/20/24 2305)  Resp: 19 (10/20/24 2305)  BP: 137/66 (10/20/24 2305)  SpO2: 98 % (10/20/24 2305) Vital Signs (24h Range):  Temp:  [97.5 °F (36.4 °C)-98.8 °F (37.1 °C)] 98.7 °F (37.1 °C)  Pulse:  [] 108  Resp:  [11-19] 19  SpO2:  [97  %-100 %] 98 %  BP: (112-150)/(56-74) 137/66  Arterial Line BP: (123-150)/(54-66) 146/57     Date 10/20/24 0700 - 10/21/24 0659   Shift 8813-3888 4687-0090 1255-0187 24 Hour Total   INTAKE   I.V.(mL/kg) 110.7(1.3) 37.6(0.5)  148.3(1.8)   NG/ 315  665   IV Piggyback 555.1   555.1   Shift Total(mL/kg) 1015.8(12.2) 352.6(4.2)  1368.4(16.4)   OUTPUT   Urine(mL/kg/hr)  500(0.7)  500   Drains  25 34 59   Shift Total(mL/kg)  525(6.3) 34(0.4) 559(6.7)   Weight (kg) 83.5 83.5 83.5 83.5              Vent Mode: Spont  Oxygen Concentration (%):  [40] 40  Resp Rate Total:  [11 br/min-21 br/min] 21 br/min  PEEP/CPAP:  [5 cmH20] 5 cmH20  Pressure Support:  [5 cmH20] 5 cmH20  Mean Airway Pressure:  [7 cmH20-7.3 cmH20] 7.3 cmH20             Closed/Suction Drain 10/19/24 Tube - 1 Right Scalp Accordion 10 Fr. (Active)   Site Description Unable to view 10/20/24 2305   Dressing Type Gauze 10/20/24 2305   Dressing Status Dry;Clean;Intact 10/20/24 2305   Dressing Intervention Integrity maintained 10/20/24 2305   Drainage Bloody 10/19/24 1701   Status Other (Comment) 10/19/24 1701   Output (mL) 30 mL 10/20/24 2305            Closed/Suction Drain 10/19/24 Tube - 2 Right Scalp Bulb 10 Fr. (Active)   Site Description Unable to view 10/20/24 2305   Dressing Type Gauze 10/20/24 2305   Dressing Status Clean;Dry;Intact 10/20/24 2305   Dressing Intervention Integrity maintained 10/20/24 2305   Drainage None 10/19/24 0505   Status Open to gravity drainage 10/19/24 1701   Output (mL) 4 mL 10/20/24 2305            NG/OG Tube 10/19/24 1200 Right mouth (Active)   Placement Check placement verified by x-ray 10/20/24 2305   Tolerance no signs/symptoms of discomfort 10/20/24 2305   Securement secured to nostril center 10/20/24 2305   Clamp Status/Tolerance unclamped 10/20/24 2305   Suction Setting/Drainage Method suction at the bedside 10/20/24 2305   Insertion Site Appearance no redness, warmth, tenderness, skin breakdown, drainage 10/20/24 2305    Drainage None 10/20/24 2305   Flush/Irrigation flushed w/;water 10/20/24 2305   Feeding Type continuous;by pump 10/20/24 2305   Feeding Action feeding continued 10/20/24 2305   Current Rate (mL/hr) 45 mL/hr 10/20/24 2305   Goal Rate (mL/hr) 45 mL/hr 10/20/24 2305   Intake (mL) 30 mL 10/20/24 1712   Formula Name diabetic isosource 10/20/24 2305   Intake (mL) - Formula Tube Feeding 45 10/20/24 2205       Male External Urinary Catheter 10/20/24 1934 (Active)   Collection Container Urimeter 10/20/24 2305   Securement Method secured to top of thigh w/ adhesive device 10/20/24 2305   Skin no redness;no breakdown 10/20/24 2305   Tolerance no signs/symptoms of discomfort 10/20/24 2305          Physical Exam         Neurosurgery Physical Exam    E1VTM5  PERRL  +c/g/c  Loc RUE/RLE  WD LUE/LLE    Significant Labs:  Recent Labs   Lab 10/19/24  0158 10/19/24  0607 10/20/24  0027 10/20/24  0302 10/20/24  0605 10/20/24  0802 10/20/24  1808   *  155*   < > 159* 167*  --  170*  --      145   < > 154*  154* 152* 152* 153* 153*   K 3.6  3.6   < > 4.0 4.1  --  4.0  --    *  118*   < > 122* 122*  --  124*  --    CO2 17*  17*   < > 19* 18*  --  19*  --    BUN 29*  29*   < > 41* 42*  --  46*  --    CREATININE 1.8*  1.8*   < > 2.5* 2.4*  --  2.6*  --    CALCIUM 7.7*  7.7*   < > 8.1* 8.1*  --  8.1*  --    MG 2.2  --   --  2.4  --   --   --     < > = values in this interval not displayed.     Recent Labs   Lab 10/19/24  0158 10/20/24  0302 10/20/24  0523   WBC 10.13 16.66*  --    HGB 8.7* 8.8*  --    HCT 28.2* 28.9* 23*    205  --      Recent Labs   Lab 10/19/24  0158   INR 1.1   APTT 25.0     Microbiology Results (last 7 days)       Procedure Component Value Units Date/Time    Blood culture [[id number]] Collected: 10/18/24 0345    Order Status: Completed Specimen: Blood from Peripheral, Hand, Left Updated: 10/20/24 0612     Blood Culture, Routine No Growth to date      No Growth to date      No Growth to  date    Blood culture [[id number]] Collected: 10/18/24 0335    Order Status: Completed Specimen: Blood from Peripheral, Foot, Right Updated: 10/20/24 0612     Blood Culture, Routine No Growth to date      No Growth to date      No Growth to date    Culture, Respiratory with Gram Stain [[id number]]     Order Status: No result Specimen: Respiratory           All pertinent labs from the last 24 hours have been reviewed.    Significant Diagnostics:  CT: No results found in the last 24 hours.  MRI: No results found in the last 24 hours.

## 2024-10-21 NOTE — ASSESSMENT & PLAN NOTE
83 y.o. 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 Given K-centra at OSH.     Taken to OR 10/18 for R frontal IPH evac via eyebrow supraorbital craniotomy. Unfortunately, interval development of large SDH on post op scan, taken back to OR emergently for acute SDH evacuation.     Imaging:  -CTH OSH 10/17: 5cm R frontal ICH with 7mm MLS and some intraventricular blood in posterior lateral vent   -CT Csp OSH 10/17: no acute processes   -rCTH 10/18: grossly stable   -CTA 10/18: post op clot evac with new holoconvexity R SDH, 12 mm MLS   -Post op CTH 10/18: s/p SDH evacuation with appropriate evac, CTA neg   -CTH 10/21: new/increased hyperdensity in the resection cavity without worsening mass effect or midline shift     S/p R SDH evac on 10/18    Plan:  Patient admitted to ICU on telemetry, q1h neuro checks  Hold anti-plt/coag medications. Given K-centra for reversal at OSH. INR 1.2 on arrival  SBP <140   Na >135  Keppra 500 BID, recommend increase to 1g BID given burden of hemorrage  LEANDRO management per NCC  Keep drains today  No HOB restrictions  SDD to thumbprint, SG drain to full suction; abx while in place  WTE plan per NCC - tolerating spont  Continue to monitor clinically, notify NSGY immediately with any changes in neuro status    Plan discussed with Dr. Condon    Dispo: admitted to ICU

## 2024-10-21 NOTE — PLAN OF CARE
[facility] Care Plan  POC reviewed with Percy Ramirez and family at 1400. Family verbalized understanding. Questions and concerns addressed. No acute events today. Pt progressing toward goals. Will continue to monitor. See below and flowsheets for full assessment and VS info.     -labetalol given x2  -hydralazine given x 1   -Cardene started @ 2.5  -large bowel movements x2      Is this a stroke patient? no    Neuro:  [place] Coma Scale  Best Eye Response: 3-->(E3) to speech  Best Motor Response: 5-->(M5) localizes pain  Best Verbal Response: 1-->(V1) none  [place] Coma Scale Score: 9  Assessment Qualifiers: patient intubated  Pupil PERRLA: yes     24 hr Temp:  [97.5 °F (36.4 °C)-98.8 °F (37.1 °C)]     CV:   Rhythm: sinus tachycardia  BP goals:   SBP < 140  MAP > 65    Resp:      Vent Mode: Spont  Set Rate: 16 BPM  Oxygen Concentration (%): 40  Vt Set: 500 mL  PEEP/CPAP: 5 cmH20  Pressure Support: 5 cmH20    Plan: wean to extubate    GI/:     Diet/Nutrition Received: tube feeding  Last Bowel Movement: 10/21/24  Voiding Characteristics: external catheter    Intake/Output Summary (Last 24 hours) at 10/21/2024 0635  Last data filed at 10/21/2024 0605  Gross per 24 hour   Intake 1857.55 ml   Output 595 ml   Net 1262.55 ml     Unmeasured Output  Urine Occurrence: 1  Stool Occurrence: 1    Labs/Accuchecks:  Recent Labs   Lab 10/21/24  0321 10/21/24  0330   WBC 13.13*  --    RBC 2.85*  --    HGB 8.1*  --    HCT 27.1* 22*     --       Recent Labs   Lab 10/21/24  0321   *   K 3.7   CO2 19*   *   BUN 57*   CREATININE 2.5*   ALKPHOS 82   ALT <5*   AST 11   BILITOT 0.2      Recent Labs   Lab 10/19/24  0158   INR 1.1   APTT 25.0      Recent Labs   Lab 10/17/24  1928   TROPONINI 0.022       Electrolytes: No replacement orders  Accuchecks: Q1H    Gtts:   insulin regular 1 units/mL infusion orderable (DKA)  0-52 Units/hr Intravenous Continuous 1.7 mL/hr at 10/21/24 0605 1.65 Units/hr at 10/21/24 0605    nicardipine   0-15 mg/hr Intravenous Continuous 12.5 mL/hr at 10/21/24 06 2.5 mg/hr at 10/21/24 0605       LDA/Wounds:    Mykel Risk Assessment  Sensory Perception: 3-->slightly limited  Moisture: 3-->occasionally moist  Activity: 1-->bedfast  Mobility: 2-->very limited  Nutrition: 3-->adequate  Friction and Shear: 2-->potential problem  Mykel Score: 14    Is your mykel score 12 or less? no      Nurses Note -- 4 Eyes    Is there altered skin present?  yes  Second RN/Staff Member:  RADHA Da Silva      Restraints:   Restraint Order  Length of Order: Order good for next 24 hours or when removed.  Date that the current order will : 10/21/24  Time that the current order will : 805  Order Upon Application: Yes    [facility]

## 2024-10-21 NOTE — ASSESSMENT & PLAN NOTE
06/29/2024 echo with EF 45%, was previously 30-40%    Repeat echo shows his EF is still 45%  Holding maintenance fluids in the setting of starting enteral feeds  Received 750mL total IVF boluses due to tachycardia yx  Will give additional 1L due to LEANDRO

## 2024-10-21 NOTE — ASSESSMENT & PLAN NOTE
83 y.o. 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 Given K-centra at OSH.     Taken to OR 10/18 for R frontal IPH evac via eyebrow supraorbital craniotomy. Unfortunately, interval development of large SDH on post op scan, taken back to OR emergently for acute SDH evacuation.     Imaging:  -CTH OSH 10/17: 5cm R frontal ICH with 7mm MLS and some intraventricular blood in posterior lateral vent   -CT Csp OSH 10/17: no acute processes   -rCTH 10/18: grossly stable   -CTA 10/18: post op clot evac with new holoconvexity R SDH, 12 mm MLS   -Post op CTH 10/18: s/p SDH evacuation with appropriate evac, CTA neg     POD 2 for R SDH evac    Plan:  Patient admitted to ICU on telemetry, q1h neuro checks  Hold anti-plt/coag medications. Given K-centra for reversal at OSH. INR 1.2 on arrival  SBP <140   Na >135  Keppra 500 BID, recommend increase to 1g BID given burden of hemorrage  LEANDRO management per NCC  No HOB restrictions  SDD to thumbprint, SG drain to full suction; abx while in place  WTE plan per NCC - tolerating spont  Continue to monitor clinically, notify NSGY immediately with any changes in neuro status    Plan discussed with Dr. Condon    Dispo: admitted to ICU

## 2024-10-21 NOTE — ANESTHESIA POSTPROCEDURE EVALUATION
Anesthesia Post Evaluation    Patient: Percy Ramirez    Procedure(s) Performed: Procedure(s) (LRB):  CRANIOTOMY, Right supraorbital FOR HEMATOMA EVACUATION (Right)    Final Anesthesia Type: general      Patient location during evaluation: ICU  Patient participation: Yes- Able to Participate  Level of consciousness: awake  Post-procedure vital signs: reviewed and stable  Pain management: adequate  Airway patency: patent    PONV status at discharge: No PONV  Anesthetic complications: no      Cardiovascular status: hemodynamically stable  Respiratory status: unassisted, spontaneous ventilation and nasal cannula  Hydration status: euvolemic  Follow-up not needed.        Vital signs stable, no issues at this time.    No case tracking events are documented in the log.      Pain/Tha Score: No data recorded

## 2024-10-21 NOTE — PROGRESS NOTES
[facility]  Neurocritical Care  Progress Note    Admit Date: 10/17/2024  Service Date: 10/21/2024  Length of Stay: 4    Subjective:     Chief Complaint: Traumatic right-sided intracerebral hemorrhage without loss of consciousness    History of Present Illness: Percy Ramirez is a 83M PMHx of HTN, HLD, DM2, CAD s/p CABG x2, Afib on Coumadin, ILD on 4L of O2, HFrEF (45%), presenting as a transfer for a large traumatic R frontal IPH. [place] [facility] [place] and was found to have 4mm parafalcine SDH. Repeat CTH was stable. Pt was discharged home on keppra and instructed to hold his blood thinners. On 10/17/24 pt was lethargic and fell. He was brought back to  ED. Blood glucose was >600 and he was febrile (101F). CTH revealed 5cm R frontal IPH with 7mm MLS. Kcentra, 10mg vitamin K, and 3% HTS bolus were given. He was transferred to [facility] for higher level of care.     Hospital Course: 10/19/2024: POD #1 from both his MI LS as well as his right subdural/intraparenchymal hemorrhage evacuation.  He is still intubated and although sedation is off, he is minimally responsive. Coreg doubled. NG tube placed and tube feeds started. Still on insulin drip.  10/20/2024: POD #2 s/p crani for SDH evacuation. Leukocytosis noted post-op, no accompanying fever/chills. Straight cath x1. Tachycardia responding to IVF boluses. Tube feeds started, will titrate to goal. Insulin gtt continued, plans to transition to subQ tomorrow.  10/21/2021: POD#3. EEG placed this morning, revealing multiple r-sided seizures. Keppra load completed and maintenance dose increased. Concern for LUE no longer WD, stat head CT showing new/increased hyperdensity in resection bed w/o worsening mass effect or MLS. 1L LR given for LEANDRO. Will continue to monitor Na.     Interval History:  NAEON. EEG was placed this morning revealing R-sided seizures. Keppra load completed. Back on Cardene for  SBP goals < 140. Cr plateau'd but still elevated. Giving 1L LR.     Objective:     Vitals:  Temp: 98 °F (36.7 °C)  Pulse: 106  Rhythm: sinus tachycardia  BP: (!) 126/59  MAP (mmHg): 85  CI (L/min/m2): 3.8 L/min/m2  SVI: 38  SVV: 13 %  Resp: 17  SpO2: 98 %  Oxygen Concentration (%): 40  Vent Mode: Spont  Pressure Support: 5 cmH20  PEEP/CPAP: 5 cmH20  Peak Airway Pressure: 11 cmH20  Mean Airway Pressure: 7.3 cmH20  Plateau Pressure: 13 cmH20    Temp  Min: 98 °F (36.7 °C)  Max: 98.8 °F (37.1 °C)  Pulse  Min: 96  Max: 126  BP  Min: 122/63  Max: 150/69  MAP (mmHg)  Min: 82  Max: 99  CI (L/min/m2)  Min: 2.5 L/min/m2  Max: 4.2 L/min/m2  SVI  Min: 24  Max: 38  SVV  Min: 10 %  Max: 14 %  Resp  Min: 13  Max: 31  SpO2  Min: 95 %  Max: 100 %  Oxygen Concentration (%)  Min: 40  Max: 40    10/20 0701 - 10/21 0700  In: 1857.6 [I.V.:277.5]  Out: 595 [Urine:500; Drains:95]   Unmeasured Output  Urine Occurrence: 1  Stool Occurrence: 1  Pad Count: 2        Physical Exam  Constitutional:       General: He is not in acute distress.  HENT:      Head: Normocephalic.   Eyes:      Comments: Pupils not dilated   Cardiovascular:      Comments: Irregularly irregular  Pulmonary:      Effort: No respiratory distress.      Comments: Intubated   Musculoskeletal:         General: Swelling (LUE) present.   Skin:     General: Skin is warm and dry.   Neurological:      Comments: Intubated; arousable with physical stimuli; following commands; moving right extremities spontaneously; no WD of L side             Medications:  Continuousinsulin regular 1 units/mL infusion orderable (DKA), Last Rate: 1.5 Units/hr (10/21/24 1540)  nicardipine, Last Rate: 2.5 mg/hr (10/21/24 0605)    Scheduledatorvastatin, 40 mg, Daily  carvediloL, 12.5 mg, BID WM  ceFAZolin (Ancef) IV (PEDS and ADULTS), 2 g, Q12H  [START ON 10/22/2024] famotidine, 20 mg, Daily  levETIRAcetam (Keppra) IV (PEDS and ADULTS), 1,000 mg, Q12H  mupirocin, , BID  polyethylene glycol, 17 g,  Daily  senna-docusate 8.6-50 mg, 1 tablet, BID    PRNacetaminophen, 650 mg, Q6H PRN  dextrose 10%, 12.5 g, PRN  dextrose 10%, 25 g, PRN  hydrALAZINE, 10 mg, Q6H PRN  labetalol, 10 mg, Q6H PRN  ondansetron, 4 mg, Q8H PRN      Today I personally reviewed pertinent medications, laboratory results,     Diet  Diet NPO Except for: Medication  Diet NPO Except for: Medication      Assessment/Plan:     Neuro  * Traumatic right-sided intracerebral hemorrhage without loss of consciousness  83M PMHx of HTN, HLD, DM2, CAD s/p CABG x2, Afib on Coumadin, ILD on 4L of O2, HFrEF (45%), presenting as a transfer for a large traumatic R frontal IPH reversed with Kcentra and vitamin K with repeat INR 1.2.    Admit to NCC  q1h neuro checks, vitals, and I&O's  CBC, CMP, mag, and phos daily   Coags, TSH, A1c, lipid panel, UA  Echo, EKG, CXR  Hold AC/AP  Given unwitnessed fall, may consider MRI/MRA for further evaluation and would like to avoid CTA with current LEANDRO on CKD  SBP <140  Na goals > 145.  PRN boluses of 3% HTS if sodium less than 145  Keppra 500mg BID x7d   Neurosurgery consulted   NPO   SCDs; hold chemical VTE ppx in acute setting   PT/OT/SLP      Psychiatric  Major depressive disorder, recurrent episode, mild  Restart home SSRI once able to take p.o.    Pulmonary  ILD (interstitial lung disease)  ILD on 4L of O2    SpO2 goal > 88%  Duo nebs and tiotropium  Currently intubated    Cardiac/Vascular  Chronic combined systolic and diastolic heart failure  06/29/2024 echo with EF 45%, was previously 30-40%    Repeat echo shows his EF is still 45%  Holding maintenance fluids in the setting of starting enteral feeds  Received 750mL total IVF boluses due to tachycardia yx  Will give additional 1L due to LEANDRO    Persistent atrial fibrillation  In atrial fibrillation on admit  Hold Coumadin in setting of acute intraparenchymal hemorrhage    Hyperlipidemia LDL goal <100  Lipid panel showed low LDL, low HDL, low total  cholesterol  Atorvastatin    Coronary artery disease  S/p CABG x2 in 2004  Patient has reportedly not been on Plavix recently because of his Coumadin      Benign hypertension with chronic kidney disease, stage III  SBP goal < 140  Cardene gtt restarted  Prn labetalol and hydralazine   Coreg increased to 12.5 BID      Renal/  LEANDRO (acute kidney injury)  LEANDRO on CKD, Cr 1.8 on initial presentation to ED and now 2.2    Avoid nephrotoxic agents  Renally dose medications  Q 1 hour I&Os  Cr remains elevated to 2.5 (10/21); Na 155; will d/c valsartan and give 1 L LR     Chronic kidney disease, stage 3a  Cr 1.8 on initial presentation to ED and now 2.2    Avoid nephrotoxic agents  Renally dose medications  Q 1 hour I&Os  See LEANDRO    Endocrine  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 Beta hydroxybutyrate and urine ketones negative. Serum CO2 22. Given SQ insulin at OSH.  On arrival to [facility] [place], . Hb A1c 9.9% on admit.     Tube feeds started, will titrate to goal.  Insulin gtt. Tube feed rate adjustments. Will continue drip until BG normalizes.  Serum osmolality elevated 312.     Other  Obstructive sleep apnea treated with BiPAP  Hold off on BiPAP as patient is not alert enough to be able to remove mask/concern for aspiration risk   Daily ABG          The patient is being Prophylaxed for:  Venous Thromboembolism with: Mechanical  Stress Ulcer with: H2B or PPI  Ventilator Pneumonia with: chlorhexidine oral care    Activity Orders            Elevate HOB 30 (30-45 Degrees) starting at 10/21 1130    Turn patient starting at 10/18 0000    Diet NPO Except for: Medication: NPO starting at 10/17 2471          Full Code    Dolores Moore DO  Neurocritical Care  [facility]

## 2024-10-21 NOTE — ASSESSMENT & PLAN NOTE
LEANDRO on CKD, Cr 1.8 on initial presentation to ED and now 2.2    Avoid nephrotoxic agents  Renally dose medications  Q 1 hour I&Os  Cr remains elevated to 2.5 (10/21); Na 155; will d/c valsartan and give 1 L LR

## 2024-10-21 NOTE — OP NOTE
[facility] Neuro Critical Care  Neurosurgery  Operative Note    SUMMARY      Date of Procedure: 10/18/2024     Procedure: Procedure(s) (LRB):  CRANIOTOMY, Right supraorbital FOR HEMATOMA EVACUATION (Right)     Surgeons and Role:     * Laurence Condon MD - Primary     * Bhavya Granados MD - Resident - Assisting    Pre-Operative Diagnosis: Intracranial hemorrhage [I62.9]    Post-Operative Diagnosis: Post-Op Diagnosis Codes:     * Intracranial hemorrhage [I62.9]    Anesthesia: General    Operative Findings:  Intracerebral hemorrhage    Indication for the procedure:  83-year-old man.  He was on anticoagulation.  Noted to be found down by his family.  CT scan at outside facility noted a large right frontal intraparenchymal hematoma with associated perilesional vasogenic edema.  There was associated midline shift.  The patient has had a waxing and waning neurologic exam.  Given the degree of the hematoma, the radiographic findings.  Decision was made for supraorbital craniotomy with evacuation of the hematoma.  The patient and the patient's family well appraised of all risks, benefits, and alternatives to the procedure including not limited to heart attack, coma, stroke, infection, bleeding, paralysis, even death.  Informed consent was obtained and secured in the chart after the patient and family voiced understanding of these risks and decided proceed with the procedure.    Description of Technical Procedures:   Patient was brought to the operative theater.  She was intubated by the anesthesia team.  She was given preoperative prophylactic IV antibiotic.  He was given prophylactic hypertonic solution, as well as prophylactic antiseizure medication.  He was placed in the [place] head bridges.  We registered him to our neuro navigational system.  Accuracy was within 2 mm.  We identified appropriate landmarks such as the superior sagittal sinus, hematoma, as well as the appropriate trajectory.  Implant for an eyebrow  supraorbital craniotomy.  Patient was removed utilizing alcohol.  He was then prepped and draped in the standard sterile fashion.  The incision was opened sharply with a linear incision slight curvature along the eyebrow.  Suddenly down to the periosteum.  Incision the periosteum reflected inferiorly.  We carried our exposure down to the superior temporal line and slightly inferiorly reflected the temporalis fascia as well as the temporalis muscle.  And utilizing high-speed pneumatic drill michi hole was drilled just inferior to the superior temporal line.  Craniotomy was rendered utilizing the B1 footplate and they osteotome.  Bone flap was removed.  Dura was then opened sharply and reflected inferiorly.  Be trajectory for the hematoma with a identified.  Corticectomy was made utilizing combination of bipolar cautery.  The hematoma was sequentially removed.  The microscope was brought in for microscopic dissection resection and removal of the hematoma.  We carefully and sequentially removed the hematoma.  The hematoma was sent to pathology for analysis.  The hematoma cavity was then irrigated with copious irrigation to perfectly clear.  Hemostasis was achieved with a combination of bipolar cautery, as well as hydrogen peroxide soaked cotton balls.  Cavity was then lined with Surgicel.  [id number] units of direct thrombin was placed into the cavity there was no evidence of hematoma.  The brain was relaxed.  The dura was then reapproximated utilizing 4-0 Nurolon.  The craniotomy flap was then replaced utilizing titanium plates and screws.  The underlying galea was reapproximated utilizing 2-0 Vicryl.  And the eyebrow incision was was then reapproximated utilizing 4-0 Ethilon.  All sponge needle counts were correct at the end procedure x2.  Patient was transferred to the neuro critical care ICU in stable neurological and hemodynamic condition.    Estimated Blood Loss (EBL): 100 mL           Specimens:  Hematoma to  pathology  Specimen (24h ago, onward)      None             Implants:   Implant Name Type Inv. Item Serial No.  Lot No. LRB No. Used Action   PINS SKULL ADULT RED - [id number]  PINS SKULL ADULT RED  [employer] [id number]  1 Implanted and Explanted   DURA MATRIX ONLAY PLUS 2X2 - [id number]  DURA MATRIX ONLAY PLUS 2X2  [employer]. [id number] Right 1 Implanted   PLATE BONE BUR HLE CVR 7MM TAB - [id number]  PLATE BONE BUR HLE CVR 7MM TAB  [employer].  Right 1 Implanted   PLATE BONE 2X2 HOLE SM BOX - [id number]  PLATE BONE 2X2 HOLE SM BOX  [employer].  Right 1 Implanted   PLATE BONE 6 HOLE DBL Y SHAPE - [id number]  PLATE BONE 6 HOLE DBL Y SHAPE  [employer].  Right 1 Implanted   SCREW UN3 AXS SD 1.5X4MM - [id number]  SCREW UN3 AXS SD 1.5X4MM  [employer].  Right 15 Implanted              Condition: Good    Disposition: ICU - extubated and stable.    Attestation: I was present and scrubbed for the entire procedure.

## 2024-10-22 PROBLEM — S06.5XAA SUBDURAL HEMATOMA: Status: ACTIVE | Noted: 2024-10-22

## 2024-10-22 PROBLEM — R56.9 SEIZURES: Status: ACTIVE | Noted: 2024-10-22

## 2024-10-22 LAB
ABO + RH BLD: NORMAL
ALBUMIN SERPL BCP-MCNC: 2.1 G/DL (ref 3.5–5.2)
ALLENS TEST: ABNORMAL
ALP SERPL-CCNC: 67 U/L (ref 40–150)
ALT SERPL W/O P-5'-P-CCNC: <5 U/L (ref 10–44)
ANION GAP SERPL CALC-SCNC: 8 MMOL/L (ref 8–16)
AST SERPL-CCNC: 9 U/L (ref 10–40)
BASOPHILS # BLD AUTO: 0.01 K/UL (ref 0–0.2)
BASOPHILS NFR BLD: 0.1 % (ref 0–1.9)
BILIRUB SERPL-MCNC: 0.2 MG/DL (ref 0.1–1)
BLD GP AB SCN CELLS X3 SERPL QL: NORMAL
BLD PROD TYP BPU: NORMAL
BLOOD UNIT EXPIRATION DATE: NORMAL
BLOOD UNIT TYPE CODE: 7300
BLOOD UNIT TYPE: NORMAL
BUN SERPL-MCNC: 54 MG/DL (ref 8–23)
CALCIUM SERPL-MCNC: 7.6 MG/DL (ref 8.7–10.5)
CHLORIDE SERPL-SCNC: 127 MMOL/L (ref 95–110)
CO2 SERPL-SCNC: 22 MMOL/L (ref 23–29)
CODING SYSTEM: NORMAL
CREAT SERPL-MCNC: 1.9 MG/DL (ref 0.5–1.4)
CROSSMATCH INTERPRETATION: NORMAL
DELSYS: ABNORMAL
DIFFERENTIAL METHOD BLD: ABNORMAL
DISPENSE STATUS: NORMAL
EOSINOPHIL # BLD AUTO: 0 K/UL (ref 0–0.5)
EOSINOPHIL # BLD AUTO: 0.1 K/UL (ref 0–0.5)
EOSINOPHIL # BLD AUTO: 0.2 K/UL (ref 0–0.5)
EOSINOPHIL NFR BLD: 0.4 % (ref 0–8)
EOSINOPHIL NFR BLD: 0.8 % (ref 0–8)
EOSINOPHIL NFR BLD: 1.6 % (ref 0–8)
ERYTHROCYTE [DISTWIDTH] IN BLOOD BY AUTOMATED COUNT: 16 % (ref 11.5–14.5)
ERYTHROCYTE [DISTWIDTH] IN BLOOD BY AUTOMATED COUNT: 16 % (ref 11.5–14.5)
ERYTHROCYTE [DISTWIDTH] IN BLOOD BY AUTOMATED COUNT: 17.9 % (ref 11.5–14.5)
EST. GFR  (NO RACE VARIABLE): 34.8 ML/MIN/1.73 M^2
FINAL PATHOLOGIC DIAGNOSIS: NORMAL
FIO2: 40
GLUCOSE SERPL-MCNC: 209 MG/DL (ref 70–110)
GROSS: NORMAL
HCO3 UR-SCNC: 23.1 MMOL/L (ref 24–28)
HCT VFR BLD AUTO: 22.1 % (ref 40–54)
HCT VFR BLD AUTO: 22.6 % (ref 40–54)
HCT VFR BLD AUTO: 26.5 % (ref 40–54)
HGB BLD-MCNC: 6.6 G/DL (ref 14–18)
HGB BLD-MCNC: 6.7 G/DL (ref 14–18)
HGB BLD-MCNC: 8.1 G/DL (ref 14–18)
IMM GRANULOCYTES # BLD AUTO: 0.06 K/UL (ref 0–0.04)
IMM GRANULOCYTES # BLD AUTO: 0.09 K/UL (ref 0–0.04)
IMM GRANULOCYTES # BLD AUTO: 0.09 K/UL (ref 0–0.04)
IMM GRANULOCYTES NFR BLD AUTO: 0.7 % (ref 0–0.5)
IMM GRANULOCYTES NFR BLD AUTO: 1 % (ref 0–0.5)
IMM GRANULOCYTES NFR BLD AUTO: 1 % (ref 0–0.5)
LYMPHOCYTES # BLD AUTO: 0.5 K/UL (ref 1–4.8)
LYMPHOCYTES # BLD AUTO: 0.6 K/UL (ref 1–4.8)
LYMPHOCYTES # BLD AUTO: 0.8 K/UL (ref 1–4.8)
LYMPHOCYTES NFR BLD: 5.3 % (ref 18–48)
LYMPHOCYTES NFR BLD: 7.2 % (ref 18–48)
LYMPHOCYTES NFR BLD: 8.5 % (ref 18–48)
Lab: NORMAL
MAGNESIUM SERPL-MCNC: 2.7 MG/DL (ref 1.6–2.6)
MCH RBC QN AUTO: 28 PG (ref 27–31)
MCH RBC QN AUTO: 28.1 PG (ref 27–31)
MCH RBC QN AUTO: 28.7 PG (ref 27–31)
MCHC RBC AUTO-ENTMCNC: 29.6 G/DL (ref 32–36)
MCHC RBC AUTO-ENTMCNC: 29.9 G/DL (ref 32–36)
MCHC RBC AUTO-ENTMCNC: 30.6 G/DL (ref 32–36)
MCV RBC AUTO: 92 FL (ref 82–98)
MCV RBC AUTO: 95 FL (ref 82–98)
MCV RBC AUTO: 96 FL (ref 82–98)
MICROSCOPIC EXAM: NORMAL
MODE: ABNORMAL
MONOCYTES # BLD AUTO: 1.1 K/UL (ref 0.3–1)
MONOCYTES NFR BLD: 11.7 % (ref 4–15)
MONOCYTES NFR BLD: 11.9 % (ref 4–15)
MONOCYTES NFR BLD: 12.5 % (ref 4–15)
NEUTROPHILS # BLD AUTO: 6.6 K/UL (ref 1.8–7.7)
NEUTROPHILS # BLD AUTO: 7.1 K/UL (ref 1.8–7.7)
NEUTROPHILS # BLD AUTO: 7.4 K/UL (ref 1.8–7.7)
NEUTROPHILS NFR BLD: 77.9 % (ref 38–73)
NEUTROPHILS NFR BLD: 79.1 % (ref 38–73)
NEUTROPHILS NFR BLD: 80.1 % (ref 38–73)
NRBC BLD-RTO: 0 /100 WBC
NUM UNITS TRANS PACKED RBC: NORMAL
PCO2 BLDA: 39.8 MMHG (ref 35–45)
PEEP: 5
PH SMN: 7.37 [PH] (ref 7.35–7.45)
PHOSPHATE SERPL-MCNC: 2.3 MG/DL (ref 2.7–4.5)
PLATELET # BLD AUTO: 137 K/UL (ref 150–450)
PLATELET # BLD AUTO: 151 K/UL (ref 150–450)
PLATELET # BLD AUTO: 154 K/UL (ref 150–450)
PMV BLD AUTO: 10.7 FL (ref 9.2–12.9)
PMV BLD AUTO: 10.7 FL (ref 9.2–12.9)
PMV BLD AUTO: 10.8 FL (ref 9.2–12.9)
PO2 BLDA: 177 MMHG (ref 80–100)
POC BE: -2 MMOL/L
POC SATURATED O2: 100 % (ref 95–100)
POC TCO2: 24 MMOL/L (ref 23–27)
POCT GLUCOSE: 111 MG/DL (ref 70–110)
POCT GLUCOSE: 111 MG/DL (ref 70–110)
POCT GLUCOSE: 165 MG/DL (ref 70–110)
POCT GLUCOSE: 184 MG/DL (ref 70–110)
POCT GLUCOSE: 198 MG/DL (ref 70–110)
POCT GLUCOSE: 201 MG/DL (ref 70–110)
POCT GLUCOSE: 210 MG/DL (ref 70–110)
POCT GLUCOSE: 217 MG/DL (ref 70–110)
POCT GLUCOSE: 221 MG/DL (ref 70–110)
POCT GLUCOSE: 225 MG/DL (ref 70–110)
POCT GLUCOSE: 226 MG/DL (ref 70–110)
POCT GLUCOSE: 242 MG/DL (ref 70–110)
POCT GLUCOSE: 258 MG/DL (ref 70–110)
POCT GLUCOSE: 284 MG/DL (ref 70–110)
POCT GLUCOSE: 297 MG/DL (ref 70–110)
POCT GLUCOSE: 302 MG/DL (ref 70–110)
POCT GLUCOSE: 306 MG/DL (ref 70–110)
POCT GLUCOSE: 312 MG/DL (ref 70–110)
POCT GLUCOSE: 319 MG/DL (ref 70–110)
POCT GLUCOSE: 320 MG/DL (ref 70–110)
POCT GLUCOSE: 339 MG/DL (ref 70–110)
POCT GLUCOSE: 362 MG/DL (ref 70–110)
POCT GLUCOSE: 89 MG/DL (ref 70–110)
POCT GLUCOSE: 90 MG/DL (ref 70–110)
POCT GLUCOSE: 91 MG/DL (ref 70–110)
POCT GLUCOSE: 96 MG/DL (ref 70–110)
POTASSIUM SERPL-SCNC: 4 MMOL/L (ref 3.5–5.1)
PROT SERPL-MCNC: 5 G/DL (ref 6–8.4)
PS: 5
RBC # BLD AUTO: 2.3 M/UL (ref 4.6–6.2)
RBC # BLD AUTO: 2.39 M/UL (ref 4.6–6.2)
RBC # BLD AUTO: 2.88 M/UL (ref 4.6–6.2)
SAMPLE: ABNORMAL
SITE: ABNORMAL
SODIUM SERPL-SCNC: 152 MMOL/L (ref 136–145)
SODIUM SERPL-SCNC: 156 MMOL/L (ref 136–145)
SODIUM SERPL-SCNC: 156 MMOL/L (ref 136–145)
SODIUM SERPL-SCNC: 157 MMOL/L (ref 136–145)
SPECIMEN OUTDATE: NORMAL
SPONT RATE: 15
WBC # BLD AUTO: 8.37 K/UL (ref 3.9–12.7)
WBC # BLD AUTO: 9.05 K/UL (ref 3.9–12.7)
WBC # BLD AUTO: 9.26 K/UL (ref 3.9–12.7)

## 2024-10-22 PROCEDURE — 25000003 PHARM REV CODE 250

## 2024-10-22 PROCEDURE — 27100171 HC OXYGEN HIGH FLOW UP TO 24 HOURS

## 2024-10-22 PROCEDURE — 84100 ASSAY OF PHOSPHORUS: CPT

## 2024-10-22 PROCEDURE — 99291 CRITICAL CARE FIRST HOUR: CPT | Mod: ,,, | Performed by: PSYCHIATRY & NEUROLOGY

## 2024-10-22 PROCEDURE — C9254 INJECTION, LACOSAMIDE: HCPCS

## 2024-10-22 PROCEDURE — 85025 COMPLETE CBC W/AUTO DIFF WBC: CPT | Mod: 91

## 2024-10-22 PROCEDURE — 84295 ASSAY OF SERUM SODIUM: CPT | Mod: 91

## 2024-10-22 PROCEDURE — 94003 VENT MGMT INPAT SUBQ DAY: CPT

## 2024-10-22 PROCEDURE — 51701 INSERT BLADDER CATHETER: CPT

## 2024-10-22 PROCEDURE — P9016 RBC LEUKOCYTES REDUCED: HCPCS

## 2024-10-22 PROCEDURE — 99900026 HC AIRWAY MAINTENANCE (STAT)

## 2024-10-22 PROCEDURE — 30233N1 TRANSFUSION OF NONAUTOLOGOUS RED BLOOD CELLS INTO PERIPHERAL VEIN, PERCUTANEOUS APPROACH: ICD-10-PCS | Performed by: PSYCHIATRY & NEUROLOGY

## 2024-10-22 PROCEDURE — 99223 1ST HOSP IP/OBS HIGH 75: CPT | Mod: ,,, | Performed by: PHYSICIAN ASSISTANT

## 2024-10-22 PROCEDURE — 63600175 PHARM REV CODE 636 W HCPCS

## 2024-10-22 PROCEDURE — 83735 ASSAY OF MAGNESIUM: CPT

## 2024-10-22 PROCEDURE — 37799 UNLISTED PX VASCULAR SURGERY: CPT

## 2024-10-22 PROCEDURE — 25000003 PHARM REV CODE 250: Performed by: PSYCHIATRY & NEUROLOGY

## 2024-10-22 PROCEDURE — 27200966 HC CLOSED SUCTION SYSTEM

## 2024-10-22 PROCEDURE — 82803 BLOOD GASES ANY COMBINATION: CPT

## 2024-10-22 PROCEDURE — 20000000 HC ICU ROOM

## 2024-10-22 PROCEDURE — 85025 COMPLETE CBC W/AUTO DIFF WBC: CPT

## 2024-10-22 PROCEDURE — 94761 N-INVAS EAR/PLS OXIMETRY MLT: CPT

## 2024-10-22 PROCEDURE — 36430 TRANSFUSION BLD/BLD COMPNT: CPT

## 2024-10-22 PROCEDURE — 84295 ASSAY OF SERUM SODIUM: CPT

## 2024-10-22 PROCEDURE — 95714 VEEG EA 12-26 HR UNMNTR: CPT

## 2024-10-22 PROCEDURE — 63600175 PHARM REV CODE 636 W HCPCS: Performed by: PSYCHIATRY & NEUROLOGY

## 2024-10-22 PROCEDURE — 95720 EEG PHY/QHP EA INCR W/VEEG: CPT | Mod: ,,, | Performed by: STUDENT IN AN ORGANIZED HEALTH CARE EDUCATION/TRAINING PROGRAM

## 2024-10-22 PROCEDURE — 51798 US URINE CAPACITY MEASURE: CPT

## 2024-10-22 PROCEDURE — 80053 COMPREHEN METABOLIC PANEL: CPT

## 2024-10-22 PROCEDURE — 25000003 PHARM REV CODE 250: Performed by: STUDENT IN AN ORGANIZED HEALTH CARE EDUCATION/TRAINING PROGRAM

## 2024-10-22 PROCEDURE — 86850 RBC ANTIBODY SCREEN: CPT

## 2024-10-22 PROCEDURE — 63600175 PHARM REV CODE 636 W HCPCS: Performed by: STUDENT IN AN ORGANIZED HEALTH CARE EDUCATION/TRAINING PROGRAM

## 2024-10-22 PROCEDURE — 99900035 HC TECH TIME PER 15 MIN (STAT)

## 2024-10-22 PROCEDURE — 86920 COMPATIBILITY TEST SPIN: CPT

## 2024-10-22 RX ORDER — SILODOSIN 4 MG/1
4 CAPSULE ORAL DAILY
Status: DISCONTINUED | OUTPATIENT
Start: 2024-10-22 | End: 2024-11-01

## 2024-10-22 RX ORDER — HYDRALAZINE HYDROCHLORIDE 20 MG/ML
10 INJECTION INTRAMUSCULAR; INTRAVENOUS EVERY 4 HOURS PRN
Status: DISCONTINUED | OUTPATIENT
Start: 2024-10-22 | End: 2024-10-26

## 2024-10-22 RX ORDER — LABETALOL HCL 20 MG/4 ML
10 SYRINGE (ML) INTRAVENOUS EVERY 4 HOURS PRN
Status: DISCONTINUED | OUTPATIENT
Start: 2024-10-22 | End: 2024-10-26

## 2024-10-22 RX ORDER — AMOXICILLIN 250 MG
1 CAPSULE ORAL DAILY
Status: DISCONTINUED | OUTPATIENT
Start: 2024-10-23 | End: 2024-10-23

## 2024-10-22 RX ORDER — HEPARIN SODIUM 5000 [USP'U]/ML
5000 INJECTION, SOLUTION INTRAVENOUS; SUBCUTANEOUS EVERY 8 HOURS
Status: DISCONTINUED | OUTPATIENT
Start: 2024-10-22 | End: 2024-11-08 | Stop reason: HOSPADM

## 2024-10-22 RX ORDER — CEFAZOLIN 2 G/1
2 INJECTION, POWDER, FOR SOLUTION INTRAMUSCULAR; INTRAVENOUS
Status: COMPLETED | OUTPATIENT
Start: 2024-10-22 | End: 2024-10-25

## 2024-10-22 RX ORDER — HYDROCODONE BITARTRATE AND ACETAMINOPHEN 500; 5 MG/1; MG/1
TABLET ORAL
Status: DISCONTINUED | OUTPATIENT
Start: 2024-10-22 | End: 2024-10-22

## 2024-10-22 RX ADMIN — HEPARIN SODIUM 5000 UNITS: 5000 INJECTION INTRAVENOUS; SUBCUTANEOUS at 09:10

## 2024-10-22 RX ADMIN — HEPARIN SODIUM 5000 UNITS: 5000 INJECTION INTRAVENOUS; SUBCUTANEOUS at 03:10

## 2024-10-22 RX ADMIN — MUPIROCIN: 20 OINTMENT TOPICAL at 08:10

## 2024-10-22 RX ADMIN — ATORVASTATIN CALCIUM 40 MG: 40 TABLET, FILM COATED ORAL at 08:10

## 2024-10-22 RX ADMIN — CEFAZOLIN 2 G: 2 INJECTION, POWDER, FOR SOLUTION INTRAMUSCULAR; INTRAVENOUS at 02:10

## 2024-10-22 RX ADMIN — POLYETHYLENE GLYCOL 3350 17 G: 17 POWDER, FOR SOLUTION ORAL at 08:10

## 2024-10-22 RX ADMIN — LACOSAMIDE 100 MG: 10 INJECTION INTRAVENOUS at 08:10

## 2024-10-22 RX ADMIN — CARVEDILOL 12.5 MG: 12.5 TABLET, FILM COATED ORAL at 05:10

## 2024-10-22 RX ADMIN — CEFAZOLIN 2 G: 2 INJECTION, POWDER, FOR SOLUTION INTRAMUSCULAR; INTRAVENOUS at 10:10

## 2024-10-22 RX ADMIN — LEVETIRACETAM 1000 MG: 100 INJECTION INTRAVENOUS at 08:10

## 2024-10-22 RX ADMIN — CARVEDILOL 12.5 MG: 12.5 TABLET, FILM COATED ORAL at 08:10

## 2024-10-22 RX ADMIN — SENNOSIDES AND DOCUSATE SODIUM 1 TABLET: 50; 8.6 TABLET ORAL at 08:10

## 2024-10-22 RX ADMIN — FAMOTIDINE 20 MG: 20 TABLET ORAL at 08:10

## 2024-10-22 RX ADMIN — INSULIN HUMAN 1.5 UNITS/HR: 1 INJECTION, SOLUTION INTRAVENOUS at 01:10

## 2024-10-22 RX ADMIN — SODIUM CHLORIDE 500 ML: 9 INJECTION, SOLUTION INTRAVENOUS at 12:10

## 2024-10-22 RX ADMIN — SILODOSIN 4 MG: 4 CAPSULE ORAL at 10:10

## 2024-10-22 RX ADMIN — NOREPINEPHRINE BITARTRATE 0.02 MCG/KG/MIN: 16 SOLUTION INTRAVENOUS at 12:10

## 2024-10-22 RX ADMIN — CEFAZOLIN 2 G: 2 INJECTION, POWDER, FOR SOLUTION INTRAMUSCULAR; INTRAVENOUS at 06:10

## 2024-10-22 NOTE — NURSING
MICHAEL Hunt notified of Hgb 6.6. Redrawn Hgb according to order.     MICHAEL Hunt notified of redraw Hgb of 6.7. New order for a type and screen done. Order to release and hang unit of blood once type and screen comes back.

## 2024-10-22 NOTE — NURSING
Sia Justin NP notified at 0000 of hypotension after starting the pt on propofol as ordered.   At 0005 a new order was given for Levo drip to maintain MAP goal of 65. Levo started and titrated to maintain MAP goal.

## 2024-10-22 NOTE — SUBJECTIVE & OBJECTIVE
Interval History: 10/22/2024: Remains intubated with brainstem reflexes intact. Repeat CTH yesterday stable. MRI pending to look for underlying amyloid. No surgery to be offered at this time.    Medications:  Continuous Infusions:   insulin regular 1 units/mL infusion orderable (DKA)  0-52 Units/hr Intravenous Continuous   Stopped at 10/22/24 0810    nicardipine  0-15 mg/hr Intravenous Continuous 12.5 mL/hr at 10/21/24 2219 2.5 mg/hr at 10/21/24 2219    NORepinephrine bitartrate-D5W  0-3 mcg/kg/min Intravenous Continuous 6.3 mL/hr at 10/22/24 0202 0.02 mcg/kg/min at 10/22/24 0202    propofol  30 mcg/kg/min Intravenous Continuous 15 mL/hr at 10/22/24 0902 30 mcg/kg/min at 10/22/24 0902     Scheduled Meds:   atorvastatin  40 mg Per OG tube Daily    carvediloL  12.5 mg Per OG tube BID WM    ceFAZolin (Ancef) IV (PEDS and ADULTS)  2 g Intravenous Q8H    famotidine  20 mg Per OG tube Daily    lacosamide (Vimpat) IV orderable  100 mg Intravenous Q12H    levETIRAcetam (Keppra) IV (PEDS and ADULTS)  1,000 mg Intravenous Q12H    mupirocin   Nasal BID    polyethylene glycol  17 g Per OG tube Daily    senna-docusate 8.6-50 mg  1 tablet Per OG tube BID     PRN Meds:  Current Facility-Administered Medications:     0.9%  NaCl infusion (for blood administration), , Intravenous, Q24H PRN    acetaminophen, 650 mg, Per OG tube, Q6H PRN    dextrose 10%, 12.5 g, Intravenous, PRN    dextrose 10%, 25 g, Intravenous, PRN    hydrALAZINE, 10 mg, Intravenous, Q6H PRN    labetalol, 10 mg, Intravenous, Q6H PRN    ondansetron, 4 mg, Intravenous, Q8H PRN     Review of Systems  Objective:     Weight: 83.5 kg (184 lb)  Body mass index is 27.98 kg/m².  Vital Signs (Most Recent):  Temp: 97.8 °F (36.6 °C) (10/22/24 0845)  Pulse: 83 (10/22/24 0902)  Resp: 18 (10/22/24 0902)  BP: 125/69 (10/22/24 0902)  SpO2: 97 % (10/22/24 0902) Vital Signs (24h Range):  Temp:  [97.4 °F (36.3 °C)-99.7 °F (37.6 °C)] 97.8 °F (36.6 °C)  Pulse:  [] 83  Resp:   [14-23] 18  SpO2:  [95 %-100 %] 97 %  BP: ()/(50-77) 125/69  Arterial Line BP: ()/(38-77) 150/53     Date 10/22/24 0700 - 10/23/24 0659   Shift 2171-4412 1544-7721 3123-4818 24 Hour Total   INTAKE   I.V.(mL/kg) 57.7(0.7)   57.7(0.7)   Blood 359.6   359.6   NG/   345   Shift Total(mL/kg) 762.3(9.1)   762.3(9.1)   OUTPUT   Shift Total(mL/kg)       Weight (kg) 83.5 83.5 83.5 83.5              Vent Mode: Spont  Oxygen Concentration (%):  [40] 40  Resp Rate Total:  [14 br/min-21 br/min] 17 br/min  PEEP/CPAP:  [5 cmH20] 5 cmH20  Pressure Support:  [5 cmH20] 5 cmH20  Mean Airway Pressure:  [7.2 cmH20-7.7 cmH20] 7.5 cmH20             Closed/Suction Drain 10/19/24 Tube - 1 Right Scalp Accordion 10 Fr. (Active)   Site Description Unable to view 10/22/24 0502   Dressing Type Gauze 10/22/24 0502   Dressing Status Clean;Dry;Intact 10/22/24 0502   Dressing Intervention Integrity maintained 10/22/24 0502   Drainage Bloody 10/22/24 0502   Status To bulb suction 10/22/24 0502   Output (mL) 35 mL 10/22/24 0302            Closed/Suction Drain 10/19/24 Tube - 2 Right Scalp Bulb 10 Fr. (Active)   Site Description Unable to view 10/22/24 0502   Dressing Type Gauze 10/22/24 0502   Dressing Status Clean;Dry;Intact 10/22/24 0502   Dressing Intervention Integrity maintained 10/22/24 0502   Drainage None 10/22/24 0502   Status To bulb suction 10/22/24 0502   Output (mL) 11 mL 10/22/24 0302            NG/OG Tube 10/19/24 1200 Right mouth (Active)   Placement Check placement verified by x-ray 10/22/24 0502   Tolerance no signs/symptoms of discomfort 10/21/24 1701   Securement secured to commercial device 10/22/24 0502   Clamp Status/Tolerance unclamped 10/22/24 0502   Suction Setting/Drainage Method suction at the bedside 10/21/24 1701   Insertion Site Appearance no redness, warmth, tenderness, skin breakdown, drainage 10/22/24 0502   Drainage None 10/22/24 0502   Flush/Irrigation flushed w/;water;no resistance met 10/22/24  "0502   Feeding Type continuous 10/22/24 0502   Feeding Action feeding continued 10/22/24 0502   Current Rate (mL/hr) 65 mL/hr 10/21/24 1902   Goal Rate (mL/hr) 65 mL/hr 10/21/24 1902   Intake (mL) 65 mL 10/22/24 0602   Water Bolus (mL) 150 mL 10/22/24 0802   Formula Name diabetic isosource 10/22/24 0502   Intake (mL) - Formula Tube Feeding 65 10/22/24 0902       Male External Urinary Catheter 10/20/24 1934 (Active)   Collection Container Urimeter 10/22/24 0502   Securement Method secured to top of thigh w/ adhesive device 10/22/24 0502   Skin no redness;no breakdown 10/22/24 0502   Tolerance no signs/symptoms of discomfort 10/22/24 0502   Output (mL) 5 mL 10/22/24 0602   Catheter Change Date 10/22/24 10/22/24 0502   Catheter Change Time 0302 10/22/24 0502     Neurosurgery Physical Exam    [id number] on prop and EEG  PERRL  +c/g/c  WD BLE and RUE  No movement in LUE    Significant Labs:  Recent Labs   Lab 10/21/24  0321 10/21/24  0613 10/22/24  0002 10/22/24  0130 10/22/24  0512   *  --   --  209*  --    *   < > 156* 157* 156*   K 3.7  --   --  4.0  --    *  --   --  127*  --    CO2 19*  --   --  22*  --    BUN 57*  --   --  54*  --    CREATININE 2.5*  --   --  1.9*  --    CALCIUM 8.0*  --   --  7.6*  --    MG 2.6  --   --  2.7*  --     < > = values in this interval not displayed.     Recent Labs   Lab 10/21/24  0321 10/21/24  0330 10/22/24  0002 10/22/24  0130   WBC 13.13*  --  8.37 9.05   HGB 8.1*  --  6.6* 6.7*   HCT 27.1* 22* 22.1* 22.6*     --  151 154     No results for input(s): "LABPT", "INR", "APTT" in the last 48 hours.  Microbiology Results (last 7 days)       Procedure Component Value Units Date/Time    Blood culture [[id number]] Collected: 10/18/24 0345    Order Status: Completed Specimen: Blood from Peripheral, Hand, Left Updated: 10/22/24 0612     Blood Culture, Routine No Growth to date      No Growth to date      No Growth to date      No Growth to date      No Growth to date    " Blood culture [[id number]] Collected: 10/18/24 0335    Order Status: Completed Specimen: Blood from Peripheral, Foot, Right Updated: 10/22/24 0612     Blood Culture, Routine No Growth to date      No Growth to date      No Growth to date      No Growth to date      No Growth to date    Culture, Respiratory with Gram Stain [[id number]]     Order Status: No result Specimen: Respiratory           All pertinent labs from the last 24 hours have been reviewed.    Significant Diagnostics:  I have reviewed all pertinent imaging results/findings within the past 24 hours.   Dupixent Pregnancy And Lactation Text: This medication likely crosses the placenta but the risk for the fetus is uncertain. This medication is excreted in breast milk.

## 2024-10-22 NOTE — PROCEDURES
ICU EEG/VIDEO MONITORING REPORT    DATE OF SERVICE: 10/21/24-10/22/24  EEG NUMBER: [id number]  [facility]    METHODOLOGY   Electroencephalographic (EEG) recording is with electrodes placed according to the International 10-20 placement system.  Thirty two (32) channels of digital signal are simultaneously recorded from the scalp and may include EKG, EMG, and/or eye monitors.   Recording band pass was 0.1 to 512 hz.  Digital video recording of the patient is simultaneously recorded with the EEG.  The nursing staff report clinical symptoms and may press an event button when the patient has symptoms of clinical interest to the treating physicians.  EEG and video recording is stored and archived in digital format.  The entire recording is visually reviewed and the times identified by computer analysis as being spikes or seizures are reviewed again.  Activation procedures which include photic stimulation, hyperventilation and instructing patients to perform simple task are done in selected patients.   Compresses spectral analysis (CSA) is also performed on the activity recorded from each individual channel.  This is displayed as a power display of frequencies from 0 to 30 Hz over time.   The CSA analysis is done and displayed continuously.  This is reviewed for asymmetries in power between homologous areas of the scalp and for presence of changes in power which canbe seen when seizures occur.  Sections of suspected abnormalities on the CSA is then compared with the original EEG recording.     [employer] software was also utilized in the review of this study.  This software suite analyzes the EEG recording in multiple domains.  Coherence and rhythmicity is computed to identify EEG sections which may contain organized seizures.  Each channel undergoes analysis to detect presence of spike and sharp waves which have special and morphological characteristic of epileptic activity.  The routine EEG recording  is converted from spacial into frequency domain.  This is then displayed comparing homologous areas to identify areas of significant asymmetry.  Algorithm to identify non-cortically generated artifact is used to separate eye movement, EMG and other artifact from the EEG.      Recording Times  Start on 10/21/24 at 09:48  Stop on 10/22/24 at 07:00  A total of 20 hours and 54 minutes of EEG was recorded.    This EEG study is performed in the ICU with the patient on the following sedative medications: propofol 30 mcg/kg/min (added shelter through study)    Indication: 83 y.o. male with a past medical history of CABG x2 on Coumadin, HTN, T2DM, HLD who was transferred from [facility] for management of large traumatic right frontal ICH.     State of Consciousness:   Coma    Background:   The background is continuous and asymmetric.Reactivity is present.  The background consists of intermixed theta/delta activity without a posterior dominant rhythm.  The right hemisphere contains more focal delta slowing with embedded epileptiform activity in the right posterior quadrant.  At approximately 23:00 propofol is started and the record becomes discontinuous.    Sleep:   No sleep architecture is appreciated during the record    Epileptiform Abnormalities  Right hemispheric lateralized periodic discharges, maximum P4/T6    EKG:   Irregular rhythm    Seizures/Events:   A total of 16 subclinical seizures are recorded, the majority of which arise from the right temporal/parietal region.  Electrographically, these are characterized by repetitive spike/wave complexes at P4/T6.  A few seizures arise from the right frontal region and are characterized by repetitive spikes at Fp2/F4.   The last seizure occurs at 22:21.      Impression:   Abnormal study consistent with a highly epileptogenic right hemispheric structural lesion and a severe diffuse encephalopathy.  A total of 16 right hemispheric subclinical seizures are recorded.  Seizures stop  when sedation with propofol is started, but intermittent/semi periodic discharges continue in the right temporal/parietal region throughout the study.       Mayra Trammell MD  [facility]   Department of Neurology/Epilepsy

## 2024-10-22 NOTE — ASSESSMENT & PLAN NOTE
84 y.o. 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 Given K-centra at OSH.     Taken to OR 10/18 for R frontal IPH evac via eyebrow supraorbital craniotomy. Unfortunately, interval development of large SDH on post op scan, taken back to OR emergently for acute SDH evacuation.     Imaging:  -CTH OSH 10/17: 5cm R frontal ICH with 7mm MLS and some intraventricular blood in posterior lateral vent   -CT Csp OSH 10/17: no acute processes   -rCTH 10/18: grossly stable   -CTA 10/18: post op clot evac with new holoconvexity R SDH, 12 mm MLS   -Post op CTH 10/18: s/p SDH evacuation with appropriate evac, CTA neg   -CTH 10/21: new/increased hyperdensity in the resection cavity without worsening mass effect or midline shift   -rCTH 10/21: stable    S/p R SDH evac on 10/18    Plan:  Patient admitted to ICU on telemetry, q1h neuro checks  Hold anti-plt/coag medications. Given K-centra for reversal at OSH. INR 1.2 on arrival  MRI brain w wo to look for amyloid, ordered  SBP <140   Na >135  Keppra 1g BID given burden of hemorrhage  LEANDRO management per NCC  Keep drains today  No HOB restrictions  SDD to thumbprint, SG drain to full suction; abx while in place  WTE plan per NCC  Continue to monitor clinically, notify NSGY immediately with any changes in neuro status    Plan discussed with Dr. Condon    Dispo: admitted to ICU

## 2024-10-22 NOTE — NURSING
MICHAEL Hunt notified of pt decreased urine output and decreased BP. Order for 500mL NS bolus given.   Pt also bladder scanned and straight cath'd.

## 2024-10-22 NOTE — ASSESSMENT & PLAN NOTE
06/29/2024 echo with EF 45%, was previously 30-40%    Repeat echo shows his EF is still 45%  Holding maintenance fluids in the setting of starting enteral feeds  Received 1.5L yx and overnight due to LEANDRO and hypotension  HDS

## 2024-10-22 NOTE — ASSESSMENT & PLAN NOTE
83M PMHx of HTN, HLD, DM2, CAD s/p CABG x2, Afib on Coumadin, ILD on 4L of O2, HFrEF (45%), presenting as a transfer for a large traumatic R frontal IPH reversed with Kcentra and vitamin K with repeat INR 1.2.    Admit to NCC  q1h neuro checks, vitals, and I&O's  CBC, CMP, mag, and phos daily   Coags, TSH, A1c, lipid panel, UA  Echo, EKG, CXR  Given unwitnessed fall, may consider MRI/MRA for further evaluation and would like to avoid CTA with current LEANDRO on CKD  SBP <140  Na goals > 145.  PRN boluses of 3% HTS if sodium less than 145  Neurosurgery consulted   NPO   SCDs; heparin for VTE prophylaxis  PT/OT/SLP

## 2024-10-22 NOTE — PLAN OF CARE
[facility] Care Plan    POC reviewed with Percy Ramirez and family at 0300. Patient unable to verbalize understanding. Questions and concerns addressed. See nursing notes for acute events today. Pt progressing toward goals. Will continue to monitor. See below and flowsheets for full assessment and VS info.     - prop started; non-titrating at 30  - insulin drip continued  - bladder scan x1, straight cath x1  - feeds continued at goal of 65  - 500 mL NS bolus given   - EEG continued overnight  - 1 unit RBCs started    Is this a stroke patient? yes- Stroke booklet reviewed with patient, risk factors identified for patient and stroke booklet remains at bedside for ongoing education.     Care individualization: position adjusted, stimulation decreased    Neuro:  Federico Coma Scale  Best Eye Response: 1-->(E1) none  Best Motor Response: 4-->(M4) withdraws from pain  Best Verbal Response: 1-->(V1) none  Federico Coma Scale Score: 6  Assessment Qualifiers: patient intubated, patient chemically sedated or paralyzed  Pupil PERRLA: yes     24 hr Temp:  [97.4 °F (36.3 °C)-99.7 °F (37.6 °C)]     CV:   Rhythm: (P) normal sinus rhythm  BP goals:   SBP < 140  MAP > 65    Resp:      Vent Mode: Spont  Set Rate: 16 BPM  Oxygen Concentration (%): 40  Vt Set: 500 mL  PEEP/CPAP: 5 cmH20  Pressure Support: 5 cmH20    Plan: wean to extubate    GI/:     Diet/Nutrition Received: tube feeding  Last Bowel Movement: 10/22/24  Voiding Characteristics: external catheter    Intake/Output Summary (Last 24 hours) at 10/22/2024 0621  Last data filed at 10/22/2024 0502  Gross per 24 hour   Intake 3706.05 ml   Output 1326 ml   Net 2380.05 ml     Unmeasured Output  Urine Occurrence: 1  Stool Occurrence: 1  Pad Count: 2    Labs/Accuchecks:  Recent Labs   Lab 10/22/24  0130   WBC 9.05   RBC 2.39*   HGB 6.7*   HCT 22.6*         Recent Labs   Lab 10/22/24  0130 10/22/24  0512   * 156*   K 4.0  --    CO2 22*  --    *  --    BUN 54*  --     CREATININE 1.9*  --    ALKPHOS 67  --    ALT <5*  --    AST 9*  --    BILITOT 0.2  --       Recent Labs   Lab 10/19/24  0158   INR 1.1   APTT 25.0      Recent Labs   Lab 10/17/24  1928   TROPONINI 0.022       Electrolytes: N/A - electrolytes WDL  Accuchecks: Q1H    Gtts:   insulin regular 1 units/mL infusion orderable (DKA)  0-52 Units/hr Intravenous Continuous 11.6 mL/hr at 10/22/24 0503 11.6 Units/hr at 10/22/24 0503    nicardipine  0-15 mg/hr Intravenous Continuous 12.5 mL/hr at 10/21/24 2219 2.5 mg/hr at 10/21/24 2219    NORepinephrine bitartrate-D5W  0-3 mcg/kg/min Intravenous Continuous 6.3 mL/hr at 10/22/24 0202 0.02 mcg/kg/min at 10/22/24 0202    propofol  30 mcg/kg/min Intravenous Continuous 15 mL/hr at 10/22/24 0502 30 mcg/kg/min at 10/22/24 0502       LDA/Wounds:    Mykel Risk Assessment  Sensory Perception: 3-->slightly limited  Moisture: 3-->occasionally moist  Activity: 1-->bedfast  Mobility: 2-->very limited  Nutrition: 3-->adequate  Friction and Shear: 2-->potential problem  Mykel Score: 14  Is your mykel score 12 or less? no    Nurses Note -- 4 Eyes    Is there altered skin present?  No  Second RN/Staff Member:  RADHA Mercer      Restraints:   Restraint Order  Length of Order: Order good for next 24 hours or when removed.  Date that the current order will : 10/22/24  Time that the current order will : 1500  Order Upon Application: Yes    [facility]

## 2024-10-22 NOTE — ASSESSMENT & PLAN NOTE
EEG placed morning of 10/21 due to delay in return to expected LOC  -Multiple seizures throughout the day  -Has been loaded with keppra and vimpat  -Receiving maintenance keppra and vimpat, on prop drip at 40 with good response

## 2024-10-22 NOTE — ASSESSMENT & PLAN NOTE
SBP goal < 140  Prn labetalol and hydralazine   Coreg increased to 12.5 BID  10/21: holding PRNs due to overnight hypotension

## 2024-10-22 NOTE — ASSESSMENT & PLAN NOTE
In setting of R IPH s/p R supraorbital craniotomy for hematoma evacuation 10/18 and R craniotomy for evacuation of post-op subdural hematoma 10/19. EEG initiated 10/21 showing right hemispheric seizures.      Recommendations:  - Continuous vEEG monitoring - significant improvement in seizure frequency after addition of Propofol 10/21 pm, one subclinical seizure noted so far 10/22 am  - Recommend to continue Lacosamide 100 mg BID, Levetiracetam 1000 mg BID  - Propofol increased to 40 mKm, recommend to continue at this rate overnight  - Avoid agents that lower seizure threshold  - Management of infectious/metabolic abnormalities per NCC  - Seizure precautions    Discussed plan of care with NCC team. Will follow, please call with questions.  -

## 2024-10-22 NOTE — CONSULTS
[facility] - Neuro Critical Care  Neurology-Epilepsy  Consult Note    Patient Name: Percy Ramirez  MRN: [id number]  Admission Date: 10/17/2024  Hospital Length of Stay: 5 days  Code Status: Full Code   Attending Provider: Nam Reynoso MD   Consulting Provider: Radha Alfonso PA-C  Primary Care Physician: Kasie Edmondson MD  Principal Problem:Traumatic right-sided intracerebral hemorrhage without loss of consciousness    Consults   Subjective:     HPI:   Mr. Ramirez is an 84 year old man with significant past medical history of HTN, HLD, DM2, CAD s/p CABG x2, Afib on Coumadin, ILD on home O2, HFrEF admitted to [facility] as transfer from OSH For evaluation and management of large traumatic R frontal IPH. Patient initially presented to OSH 10/16 after mechanical fall, CTH at that that time showed 4 mm parafalcine SDH, repeat CTH stable. Patient discharged home on Levetiracetam and instructed to hold blood thinners. He subsequently had additionaly fall 10/17 and re-presented to OSH ER, where CTH revealed 5 cm R frontal IPH with 7 mm MLS, as well as glucose >600 and fever to 101F. Patient transferred to [facility] for higher level of care, now s/p R supraorbital craniotomy for hematoma evacuation 10/18 and R craniotomy for evacuation of post-op subdural hematoma 10/19. EEG placed 10/21 am showing recurrent right seizures, patient loaded with IV Levetiracetam, Neurology Epilepsy following for assistance in management.    Hospital Course:   10/21>10/22: Recurrent right hemispheric seizures with significant improvement noted in frequency after Propofol initiated 10/21 pm.      Past Medical History:   Diagnosis Date    Allergy     Arthritis     CAD, multiple vessel     DR Melissa    Cataract     Depression     History of colon polyps     Hyperlipidemia     Hypertension     Macular degeneration     Dr Razo for injection, Jennifer for eye    S/P CABG x 2     Type 2 diabetes mellitus with circulatory disorder     Dr. Aquino       Past  "Surgical History:   Procedure Laterality Date    broken elbow      left    COLONOSCOPY N/A 10/4/2017    Procedure: COLONOSCOPY;  Surgeon: Gabriel Leung MD;  Location: [facility];  Service: Endoscopy;  Laterality: N/A;    CRANIOTOMY FOR EVACUATION OF SUBDURAL HEMATOMA Right 10/18/2024    Procedure: CRANIOTOMY, FOR SUBDURAL HEMATOMA EVACUATION;  Surgeon: Laurence Condon MD;  Location: [facility] OR [address] [place] [address];  Service: Neurosurgery;  Laterality: Right;    EYE SURGERY      cataract    heart bypass      2004    HERNIA REPAIR      right    ROTATOR CUFF REPAIR      right  2004       Review of patient's allergies indicates:   Allergen Reactions    Aricept odt [donepezil] Diarrhea and Nausea And Vomiting    Codeine Nausea Only     weak    Ranexa [ranolazine]        No current facility-administered medications on file prior to encounter.     Current Outpatient Medications on File Prior to Encounter   Medication Sig    acetaminophen (TYLENOL) 500 MG tablet Take 1 tablet (500 mg total) by mouth every 6 (six) hours as needed.    albuterol (PROVENTIL) 2.5 mg /3 mL (0.083 %) nebulizer solution Take 3 mLs (2.5 mg total) by nebulization every 6 to 8 hours as needed for Wheezing.    albuterol (PROVENTIL/VENTOLIN HFA) 90 mcg/actuation inhaler INHALE 2 PUFFS BY MOUTH EVERY 6 HOURS AS NEEDED FOR WHEEZING OR  SHORTNESS  OF  BREATH    atorvastatin (LIPITOR) 40 MG tablet Take 1 tablet by mouth every morning.    BD INSULIN PEN NEEDLE UF SHORT 31 gauge x 5/16" Ndle     BD INSULIN SYRINGE ULTRA-FINE 1/2 mL 31 gauge x 15/64" Syrg     blood sugar diagnostic Strp To check BG 3 times daily, to use with insurance preferred meter    carvediloL (COREG) 3.125 MG tablet Take 1 tablet (3.125 mg total) by mouth 2 (two) times daily with meals.    ceramides 1,3,6-II (CERAVE DAILY MOISTURIZING) Lotn Apply twice daily to skin    cinnamon bark 500 mg capsule Take 1,000 mg by mouth once daily.      clopidogreL (PLAVIX) 75 mg tablet Take 75 mg by mouth.    " "diclofenac sodium (VOLTAREN) 1 % Gel Apply 2 g topically 3 (three) times daily.    DULoxetine (CYMBALTA) 60 MG capsule Take 1 capsule (60 mg total) by mouth once daily.    fluticasone propionate (FLONASE) 50 mcg/actuation nasal spray 1 spray (50 mcg total) by Each Nostril route 2 (two) times daily.    furosemide (LASIX) 40 MG tablet Take 40 mg by mouth once daily.    gabapentin (NEURONTIN) 300 MG capsule Take 4 capsules (1,200 mg total) by mouth 2 (two) times daily.    glimepiride (AMARYL) 4 MG tablet Take 4 mg by mouth daily with breakfast.     HYDROcodone-acetaminophen (NORCO) 7.5-325 mg per tablet Take 1 tablet by mouth every 8 (eight) hours as needed for Pain.    [START ON 11/3/2024] HYDROcodone-acetaminophen (NORCO) 7.5-325 mg per tablet Take 1 tablet by mouth every 8 (eight) hours as needed for Pain.    insulin NPH-insulin regular, 70/30, (NOVOLIN 70/30) 100 unit/mL (70-30) injection Inject into the skin. Inject 10 units at breakfast and inject 10 units at night    insulin syringe-needle U-100 0.3 mL 31 gauge x 15/64" Syrg USE TO INJECT INSULIN THREE TIMES DAILY    insulin syringe-needle U-100 1/2 mL 31 x 5/16" Syrg     isosorbide mononitrate (IMDUR) 30 MG 24 hr tablet Take 30 mg by mouth once daily.    lancets Misc To check BG 3 times daily, to use with insurance preferred meter    levETIRAcetam (KEPPRA) 500 MG Tab Take 1 tablet (500 mg total) by mouth 2 (two) times daily. for 7 days    LIDOcaine (LIDODERM) 5 % Place 1 patch onto the skin once daily. Remove & Discard patch within 12 hours or as directed by MD    meclizine (ANTIVERT) 12.5 mg tablet Take 1 tablet (12.5 mg total) by mouth 3 (three) times daily as needed for Dizziness.    methocarbamoL (ROBAXIN) 500 MG Tab TAKE 1 TABLET BY MOUTH 4 TIMES DAILY FOR 10 DAYS    mupirocin (BACTROBAN) 2 % ointment Apply topically 3 (three) times daily.    nitroGLYCERIN (NITROSTAT) 0.4 MG SL tablet DISSOLVE 1 TABLET UNDER THE TONGUE EVERY 5 MINUTES AS  NEEDED FOR CHEST " PAIN. MAX  OF 3 TABLETS IN 15 MINUTES. CALL 911 IF PAIN PERSISTS.    omeprazole (PRILOSEC) 20 MG capsule Take 1 capsule (20 mg total) by mouth once daily.    pravastatin (PRAVACHOL) 20 MG tablet Take 1 tablet (20 mg total) by mouth once daily.    spironolactone (ALDACTONE) 25 MG tablet Take 0.5 tablets (12.5 mg total) by mouth once daily.    tiotropium-olodateroL (STIOLTO RESPIMAT) 2.5-2.5 mcg/actuation Mist Inhale 2 puffs into the lungs once daily. Controller    triamcinolone acetonide 0.1% (KENALOG) 0.1 % cream SMARTSI Application Topical 2-3 Times Daily    valsartan (DIOVAN) 40 MG tablet Take 1 tablet (40 mg total) by mouth once daily.    VIT C/VIT E AC/LUT/COPPER/ZINC (PRESERVISION LUTEIN ORAL) Take by mouth.    warfarin (COUMADIN) 5 MG tablet Take 2 tabs by mouth on Tuesday,Thursday, Saturday and Sundays then 1.5 on all other days.     Continuous Infusions:   insulin regular 1 units/mL infusion orderable (DKA)  0-52 Units/hr Intravenous Continuous 1.5 mL/hr at 10/22/24 1308 1.5 Units/hr at 10/22/24 1308    propofol  40 mcg/kg/min Intravenous Continuous 20 mL/hr at 10/22/24 1302 40 mcg/kg/min at 10/22/24 1302       Family History       Problem Relation (Age of Onset)    Arthritis Mother    Cancer Brother, Maternal Grandfather    Diabetes Mother, Maternal Aunt, Maternal Uncle, Paternal Aunt    Heart attack Father    Heart disease Maternal Uncle, Paternal Aunt, Paternal Uncle, Maternal Grandmother    Stroke Mother    Throat cancer Brother          Tobacco Use    Smoking status: Former     Current packs/day: 0.00     Average packs/day: 3.0 packs/day for 45.0 years (135.0 ttl pk-yrs)     Types: Cigarettes     Start date: 1959     Quit date: 2004     Years since quittin.5    Smokeless tobacco: Former   Substance and Sexual Activity    Alcohol use: Yes     Comment: was heavy drinker 35 years ago, rare use now    Drug use: No    Sexual activity: Yes     Partners: Female     Review of Systems   Unable  to perform ROS: Intubated     Objective:     Vital Signs (Most Recent):  Temp: 98 °F (36.7 °C) (10/22/24 1102)  Pulse: 77 (10/22/24 1302)  Resp: (!) 21 (10/22/24 1302)  BP: (!) 114/56 (10/22/24 1302)  SpO2: 97 % (10/22/24 1302) Vital Signs (24h Range):  Temp:  [97.4 °F (36.3 °C)-99.7 °F (37.6 °C)] 98 °F (36.7 °C)  Pulse:  [] 77  Resp:  [14-23] 21  SpO2:  [95 %-100 %] 97 %  BP: ()/(50-69) 114/56  Arterial Line BP: ()/(38-61) 137/49     Weight: 83.5 kg (184 lb)  Body mass index is 27.98 kg/m².     Physical Exam  Vitals and nursing note reviewed.   Constitutional:       Appearance: He is ill-appearing. He is not diaphoretic.   HENT:      Head:      Comments: EEG in place  R periorbital ecchymosis  Eyes:      Pupils: Pupils are equal, round, and reactive to light.   Pulmonary:      Effort: Pulmonary effort is normal. No respiratory distress.   Musculoskeletal:      Cervical back: Neck supple.   Skin:     General: Skin is warm and dry.   Psychiatric:      Comments: Unable to assess            NEUROLOGICAL EXAMINATION:     CRANIAL NERVES     CN III, IV, VI   Pupils are equal, round, and reactive to light.       CN:   AQUILINO OU   Corneal intact OU   No spontaneous eye opening   Withdraws to noxious stimuli BLE    Exam findings to suggest seizures:  Myoclonus - no   eye twitching - no   Nystagmus - no   gaze deviation - no   waxy rigidity - no         Significant Labs: All pertinent lab results from the past 24 hours have been reviewed.    Significant Studies: I have reviewed all pertinent imaging results/findings within the past 24 hours.  Assessment and Plan:     * Traumatic right-sided intracerebral hemorrhage without loss of consciousness  84 year old man with significant past medical history of HTN, HLD, DM2, CAD s/p CABG x2, Afib on Coumadin, ILD on home O2, HFrEF admitted to [facility] as transfer from OSH For evaluation and management of large traumatic R frontal IPH now s/p R supraorbital craniotomy for  hematoma evacuation 10/18 and R craniotomy for evacuation of post-op subdural hematoma 10/19. EEG initiated 10/21 showing right hemispheric seizures.    - Management per NCC, NSGY, planning for MRI brain for further evaluation  - Drains in place    Seizures  In setting of R IPH s/p R supraorbital craniotomy for hematoma evacuation 10/18 and R craniotomy for evacuation of post-op subdural hematoma 10/19. EEG initiated 10/21 showing right hemispheric seizures.      Recommendations:  - Continuous vEEG monitoring - significant improvement in seizure frequency after addition of Propofol 10/21 pm, one subclinical seizure noted so far 10/22 am  - Recommend to continue Lacosamide 100 mg BID, Levetiracetam 1000 mg BID  - Propofol increased to 40 mKm, recommend to continue at this rate overnight  - Avoid agents that lower seizure threshold  - Management of infectious/metabolic abnormalities per NCC  - Seizure precautions    Discussed plan of care with NCC team. Will follow, please call with questions.  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  - Glucose >600 on ER presentation  - Management per NCC, currently on insulin gtt    Benign hypertension with chronic kidney disease, stage III  - Vitals reviewed, management per NCC  - Currently on Carvedilol 12.5 mg BID    Major depressive disorder, recurrent episode, mild  - SSRI currently on hold, management per NCC        VTE Risk Mitigation (From admission,  onward)           Ordered     heparin (porcine) injection 5,000 Units  Every 8 hours         10/22/24 1121     IP VTE HIGH RISK PATIENT  Once         10/17/24 2326     Place sequential compression device  Until discontinued         10/17/24 2326     Reason for No Pharmacological VTE Prophylaxis  Once        Question:  Reasons:  Answer:  Active Bleeding    10/17/24 2326                    Thank you for your consult. I will follow-up with patient. Please contact us if you have any additional questions.    Radha Alfonso PA-C  Neurology-Epilepsy  [facility] - Neuro Critical Care  Staff: Dr. Trammell

## 2024-10-22 NOTE — PROGRESS NOTES
[facility] - Neuro Critical Care  Neurocritical Care  Progress Note    Admit Date: 10/17/2024  Service Date: 10/22/2024  Length of Stay: 5    Subjective:     Chief Complaint: Traumatic right-sided intracerebral hemorrhage without loss of consciousness    History of Present Illness: Percy Ramirez is a 83M PMHx of HTN, HLD, DM2, CAD s/p CABG x2, Afib on Coumadin, ILD on 4L of O2, HFrEF (45%), presenting as a transfer for a large traumatic R frontal IPH. Pt initially presented to [facility] ED yesterday ago after mechanical fall and was found to have 4mm parafalcine SDH. Repeat CTH was stable. Pt was discharged home on keppra and instructed to hold his blood thinners. On 10/17/24 pt was lethargic and fell. He was brought back to  ED. Blood glucose was >600 and he was febrile (101F). CTH revealed 5cm R frontal IPH with 7mm MLS. Kcentra, 10mg vitamin K, and 3% HTS bolus were given. He was transferred to [facility] for higher level of care.     Hospital Course: 10/19/2024: POD #1 from both his MI LS as well as his right subdural/intraparenchymal hemorrhage evacuation.  He is still intubated and although sedation is off, he is minimally responsive. Coreg doubled. NG tube placed and tube feeds started. Still on insulin drip.  10/20/2024: POD #2 s/p crani for SDH evacuation. Leukocytosis noted post-op, no accompanying fever/chills. Straight cath x1. Tachycardia responding to IVF boluses. Tube feeds started, will titrate to goal. Insulin gtt continued, plans to transition to subQ tomorrow.  10/21/2021: POD#3. EEG placed this morning, revealing multiple r-sided seizures. Keppra load completed and maintenance dose increased. Concern for LUE no longer WD, stat head CT showing new/increased hyperdensity in resection bed w/o worsening mass effect or MLS. 1L LR given for LEANDRO. Will continue to monitor Na.   10/22/24: POD#4. Addl seizures despite keppra load. Vimpat and propofol started yesterday with significant reduction in number.  1U PRBC transfused overnight. Required levo for short period of time after initiation of propofol.    Interval History:      Patient on propofol overnight for seizure control. Became hypotensive for short period of time requiring levo, but has been taken off now. 1U PRBC given due to hgb < 7, possibly 2/2 frequent blood draws.     Objective:     Vitals:  Temp: 97.7 °F (36.5 °C)  Pulse: 77  Rhythm: atrial rhythm  BP: 122/60  MAP (mmHg): 86  CVP (mean): 7 mmHg  CI (L/min/m2): 3 L/min/m2  SVI: 36  SVV: 15 %  Resp: (!) 21  SpO2: 99 %  Oxygen Concentration (%): 40  Vent Mode: A/C  Set Rate: 16 BPM  Vt Set: 500 mL  Pressure Support: 5 cmH20  PEEP/CPAP: 5 cmH20  Peak Airway Pressure: 22 cmH20  Mean Airway Pressure: 10 cmH20  Plateau Pressure: 13 cmH20    Temp  Min: 97.4 °F (36.3 °C)  Max: 99.7 °F (37.6 °C)  Pulse  Min: 77  Max: 108  BP  Min: 97/50  Max: 152/68  MAP (mmHg)  Min: 69  Max: 98  CVP (mean)  Min: 5 mmHg  Max: 241 mmHg  CI (L/min/m2)  Min: 2.9 L/min/m2  Max: 3.9 L/min/m2  SVI  Min: 35  Max: 42  SVV  Min: 11 %  Max: 16 %  Resp  Min: 14  Max: 23  SpO2  Min: 95 %  Max: 100 %  Oxygen Concentration (%)  Min: 40  Max: 40    10/21 0701 - 10/22 0700  In: 3862.6 [I.V.:519.3]  Out: 1336 [Urine:1260; Drains:76]   Unmeasured Output  Urine Occurrence: 1  Stool Occurrence: 1  Pad Count: 2        Physical Exam  Constitutional:       Interventions: He is intubated.   Eyes:      Pupils: Pupils are equal, round, and reactive to light.   Cardiovascular:      Comments: Irregularly irregular rate and rhythm  Pulmonary:      Effort: No respiratory distress. He is intubated.      Breath sounds: Normal breath sounds.   Abdominal:      General: There is no distension.      Palpations: Abdomen is soft.   Skin:     General: Skin is warm and dry.   Neurological:      Comments: Sedated; some withdrawal to pain; exam limited 2/2 sedation            Medications:  Continuousinsulin regular 1 units/mL infusion orderable (DKA), Last Rate: 1.5  Units/hr (10/22/24 1402)  propofol, Last Rate: 40 mcg/kg/min (10/22/24 1402)    Scheduledatorvastatin, 40 mg, Daily  carvediloL, 12.5 mg, BID WM  ceFAZolin (Ancef) IV (PEDS and ADULTS), 2 g, Q8H  famotidine, 20 mg, Daily  heparin (porcine), 5,000 Units, Q8H  lacosamide (Vimpat) IV orderable, 100 mg, Q12H  levETIRAcetam (Keppra) IV (PEDS and ADULTS), 1,000 mg, Q12H  mupirocin, , BID  polyethylene glycol, 17 g, Daily  [START ON 10/23/2024] senna-docusate 8.6-50 mg, 1 tablet, Daily    PRNacetaminophen, 650 mg, Q6H PRN  dextrose 10%, 12.5 g, PRN  dextrose 10%, 25 g, PRN  hydrALAZINE, 10 mg, Q6H PRN  labetalol, 10 mg, Q6H PRN  ondansetron, 4 mg, Q8H PRN      Today I personally reviewed pertinent medications, laboratory results,    Diet  Diet NPO Except for: Medication  Diet NPO Except for: Medication    Assessment/Plan:     Neuro  * Traumatic right-sided intracerebral hemorrhage without loss of consciousness  83M PMHx of HTN, HLD, DM2, CAD s/p CABG x2, Afib on Coumadin, ILD on 4L of O2, HFrEF (45%), presenting as a transfer for a large traumatic R frontal IPH reversed with Kcentra and vitamin K with repeat INR 1.2.    Admit to [facility]  q1h neuro checks, vitals, and I&O's  CBC, CMP, mag, and phos daily   Coags, TSH, A1c, lipid panel, UA  Echo, EKG, CXR  Given unwitnessed fall, may consider MRI/MRA for further evaluation and would like to avoid CTA with current LEANDRO on CKD  SBP <140  Na goals > 145.  PRN boluses of 3% HTS if sodium less than 145  Neurosurgery consulted   NPO   SCDs; heparin for VTE prophylaxis  PT/OT/SLP      Seizures  EEG placed morning of 10/21 due to delay in return to expected LOC  -Multiple seizures throughout the day  -Has been loaded with keppra and vimpat  -Receiving maintenance keppra and vimpat, on prop drip at 40 with good response    Psychiatric  Major depressive disorder, recurrent episode, mild  Restart home SSRI once able to take p.o.    Pulmonary  ILD (interstitial lung disease)  ILD on 4L of  O2    SpO2 goal > 88%  Duo nebs and tiotropium  Currently intubated    Cardiac/Vascular  Chronic combined systolic and diastolic heart failure  06/29/2024 echo with EF 45%, was previously 30-40%    Repeat echo shows his EF is still 45%  Holding maintenance fluids in the setting of starting enteral feeds  Received 1.5L yx and overnight due to LEANDRO and hypotension  HDS    Persistent atrial fibrillation  In atrial fibrillation on admit  Hold Coumadin in setting of acute intraparenchymal hemorrhage  Heparin subcu for VTE prophylaxis    Hyperlipidemia LDL goal <100  Lipid panel showed low LDL, low HDL, low total cholesterol  Atorvastatin    Coronary artery disease  S/p CABG x2 in 2004  Patient has reportedly not been on Plavix recently because of his Coumadin      Benign hypertension with chronic kidney disease, stage III  SBP goal < 140  Prn labetalol and hydralazine   Coreg increased to 12.5 BID  10/21: holding PRNs due to overnight hypotension      Renal/  LEANDRO (acute kidney injury)  LEANDRO on CKD, Cr 1.8 on initial presentation to ED and now 2.2    Avoid nephrotoxic agents  Renally dose medications  Q 1 hour I&Os  Cr remains elevated to 2.5 (10/21); Na 155; will d/c valsartan and give 1 L LR   Cr decreased to 1.9 (10/22), Na still elevated at 156; will give enteral free water for free water deficit to bring Na to 150    Chronic kidney disease, stage 3a  Cr 1.8 on initial presentation to ED and now 2.2    Avoid nephrotoxic agents  Renally dose medications  Q 1 hour I&Os  See LEANDRO    Endocrine  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 Beta hydroxybutyrate and urine ketones negative. Serum CO2 22. Given SQ insulin at OSH.  [place] [facility] Hb A1c 9.9% on admit.     Tube feeds started, will titrate to goal.  Insulin gtt. Tube feed rate adjustments. Will continue drip until BG normalizes.  Serum osmolality elevated 312.     Other  Obstructive sleep  apnea treated with BiPAP  Hold off on BiPAP as patient is not alert enough to be able to remove mask/concern for aspiration risk   Daily ABG          The patient is being Prophylaxed for:  Venous Thromboembolism with: Mechanical or Chemical  Stress Ulcer with: H2B  Ventilator Pneumonia with: chlorhexidine oral care    Activity Orders            Turn patient starting at 10/18 0000    Diet NPO Except for: Medication: NPO starting at 10/17 7182          Full Code    Dolores [place] DO  Neurocritical Care  [facility] - Neuro Critical Care

## 2024-10-22 NOTE — ASSESSMENT & PLAN NOTE
In atrial fibrillation on admit  Hold Coumadin in setting of acute intraparenchymal hemorrhage  Heparin subcu for VTE prophylaxis

## 2024-10-22 NOTE — HPI
Mr. Ramirez is an 84 year old man with significant past medical history of HTN, HLD, DM2, CAD s/p CABG x2, Afib on Coumadin, ILD on home O2, HFrEF [place] [facility] [place]SH For evaluation and management of large traumatic R frontal IPH. Patient initially presented to OSH 10/16 after mechanical fall, CTH at that that time showed 4 mm parafalcine SDH, repeat CTH stable. Patient discharged home on Levetiracetam and instructed to hold blood thinners. He subsequently had additionaly fall 10/17 and re-presented to OSH ER, where CTH revealed 5 cm R frontal IPH with 7 mm MLS, as well as glucose >600 and fever to 101F. Patient transferred [place] [facility] [place] level of care, now s/p R supraorbital craniotomy for hematoma evacuation 10/18 and R craniotomy for evacuation of post-op subdural hematoma 10/19. EEG placed 10/21 am showing recurrent right seizures, patient loaded with IV Levetiracetam, Neurology Epilepsy following for assistance in management.

## 2024-10-22 NOTE — SUBJECTIVE & OBJECTIVE
Interval History:      Patient on propofol overnight for seizure control. Became hypotensive for short period of time requiring levo, but has been taken off now. 1U PRBC given due to hgb < 7, possibly 2/2 frequent blood draws.     Objective:     Vitals:  Temp: 97.7 °F (36.5 °C)  Pulse: 77  Rhythm: atrial rhythm  BP: 122/60  MAP (mmHg): 86  CVP (mean): 7 mmHg  CI (L/min/m2): 3 L/min/m2  SVI: 36  SVV: 15 %  Resp: (!) 21  SpO2: 99 %  Oxygen Concentration (%): 40  Vent Mode: A/C  Set Rate: 16 BPM  Vt Set: 500 mL  Pressure Support: 5 cmH20  PEEP/CPAP: 5 cmH20  Peak Airway Pressure: 22 cmH20  Mean Airway Pressure: 10 cmH20  Plateau Pressure: 13 cmH20    Temp  Min: 97.4 °F (36.3 °C)  Max: 99.7 °F (37.6 °C)  Pulse  Min: 77  Max: 108  BP  Min: 97/50  Max: 152/68  MAP (mmHg)  Min: 69  Max: 98  CVP (mean)  Min: 5 mmHg  Max: 241 mmHg  CI (L/min/m2)  Min: 2.9 L/min/m2  Max: 3.9 L/min/m2  SVI  Min: 35  Max: 42  SVV  Min: 11 %  Max: 16 %  Resp  Min: 14  Max: 23  SpO2  Min: 95 %  Max: 100 %  Oxygen Concentration (%)  Min: 40  Max: 40    10/21 0701 - 10/22 0700  In: 3862.6 [I.V.:519.3]  Out: 1336 [Urine:1260; Drains:76]   Unmeasured Output  Urine Occurrence: 1  Stool Occurrence: 1  Pad Count: 2        Physical Exam  Constitutional:       Interventions: He is intubated.   Eyes:      Pupils: Pupils are equal, round, and reactive to light.   Cardiovascular:      Comments: Irregularly irregular rate and rhythm  Pulmonary:      Effort: No respiratory distress. He is intubated.      Breath sounds: Normal breath sounds.   Abdominal:      General: There is no distension.      Palpations: Abdomen is soft.   Skin:     General: Skin is warm and dry.   Neurological:      Comments: Sedated; some withdrawal to pain; exam limited 2/2 sedation            Medications:  Continuousinsulin regular 1 units/mL infusion orderable (DKA), Last Rate: 1.5 Units/hr (10/22/24 1402)  propofol, Last Rate: 40 mcg/kg/min (10/22/24 1402)    Scheduledatorvastatin, 40  mg, Daily  carvediloL, 12.5 mg, BID WM  ceFAZolin (Ancef) IV (PEDS and ADULTS), 2 g, Q8H  famotidine, 20 mg, Daily  heparin (porcine), 5,000 Units, Q8H  lacosamide (Vimpat) IV orderable, 100 mg, Q12H  levETIRAcetam (Keppra) IV (PEDS and ADULTS), 1,000 mg, Q12H  mupirocin, , BID  polyethylene glycol, 17 g, Daily  [START ON 10/23/2024] senna-docusate 8.6-50 mg, 1 tablet, Daily    PRNacetaminophen, 650 mg, Q6H PRN  dextrose 10%, 12.5 g, PRN  dextrose 10%, 25 g, PRN  hydrALAZINE, 10 mg, Q6H PRN  labetalol, 10 mg, Q6H PRN  ondansetron, 4 mg, Q8H PRN      Today I personally reviewed pertinent medications, laboratory results,    Diet  Diet NPO Except for: Medication  Diet NPO Except for: Medication

## 2024-10-22 NOTE — PLAN OF CARE
[facility] Care Plan  POC reviewed with Percy Ramirez and family at 1200. Family verbalized understanding. Questions and concerns addressed. No acute events today. Pt progressing toward goals. Will continue to monitor. See below and flowsheets for full assessment and VS info.     -propofol increased to 40 mcg/kg/min  -insulin gtt turned off this AM and then back on this PM  -full bath with linen change completed  -cEEG  -flexiseal placed for frequent loose stools  -bladder scan q6h, straight cath x 2        Is this a stroke patient? yes- Stroke booklet reviewed with family, risk factors identified for patient and stroke booklet remains at bedside for ongoing education.     Care individualization: yes    Neuro:  Elkins Coma Scale  Best Eye Response: 1-->(E1) none  Best Motor Response: 4-->(M4) withdraws from pain  Best Verbal Response: 1-->(V1) none  Elkins Coma Scale Score: 6  Assessment Qualifiers: patient chemically sedated or paralyzed, patient intubated  Pupil PERRLA: yes     24 hr Temp:  [97.4 °F (36.3 °C)-99.7 °F (37.6 °C)]     CV:   Rhythm: atrial rhythm  BP goals:   SBP < 140  MAP > 65    Resp:      Vent Mode: A/C  Set Rate: 16 BPM  Oxygen Concentration (%): 40  Vt Set: 500 mL  PEEP/CPAP: 5 cmH20  Pressure Support: 5 cmH20    Plan: wean to extubate    GI/:     Diet/Nutrition Received: tube feeding  Last Bowel Movement: 10/22/24  Voiding Characteristics: external catheter    Intake/Output Summary (Last 24 hours) at 10/22/2024 1728  Last data filed at 10/22/2024 1702  Gross per 24 hour   Intake 5039.7 ml   Output 1716 ml   Net 3323.7 ml     Unmeasured Output  Urine Occurrence: 1  Stool Occurrence: 2  Pad Count: 2    Labs/Accuchecks:  Recent Labs   Lab 10/22/24  1008   WBC 9.26   RBC 2.88*   HGB 8.1*   HCT 26.5*   *      Recent Labs   Lab 10/22/24  0130 10/22/24  0512   * 156*   K 4.0  --    CO2 22*  --    *  --    BUN 54*  --    CREATININE 1.9*  --    ALKPHOS 67  --    ALT <5*  --    AST 9*   --    BILITOT 0.2  --       Recent Labs   Lab 10/19/24  0158   INR 1.1   APTT 25.0      Recent Labs   Lab 10/17/24  1928   TROPONINI 0.022       Electrolytes: N/A - electrolytes WDL  Accuchecks: Q1H    Gtts:   insulin regular 1 units/mL infusion orderable (DKA)  0-52 Units/hr Intravenous Continuous 1.5 mL/hr at 10/22/24 1402 1.5 Units/hr at 10/22/24 1402    propofol  40 mcg/kg/min Intravenous Continuous 20 mL/hr at 10/22/24 1402 40 mcg/kg/min at 10/22/24 1402       LDA/Wounds:    Mykel Risk Assessment  Sensory Perception: 1-->completely limited  Moisture: 3-->occasionally moist  Activity: 1-->bedfast  Mobility: 1-->completely immobile  Nutrition: 3-->adequate  Friction and Shear: 2-->potential problem  Mykel Score: 11    Is your mykel score 12 or less? no      Nurses Note -- 4 Eyes    Is there altered skin present?  yes     Please check the following boxes that apply:   [] LDA Added if Not in Epic (Describe Wound)   [] New Altered Skin Integrity was Present on Admit and Documented in LDA   [] Wound Image Taken    Wound Care Consulted? Yes     Second RN/Staff Member:  RADHA Capellan      Restraints:   Restraint Order  Length of Order: Order good for next 24 hours or when removed.  Date that the current order will : 10/23/24  Time that the current order will : 1518  Order Upon Application: Yes    [facility]\

## 2024-10-22 NOTE — ASSESSMENT & PLAN NOTE
Initial presentation c/f HHS w glucose > 600. Beta hydroxybutyrate and urine ketones negative. Serum CO2 22. Given SQ insulin at OSH.  On arrival to [facility], . Hb A1c 9.9% on admit.     Tube feeds started, will titrate to goal.  Insulin gtt. Tube feed rate adjustments. Will continue drip until BG normalizes.  Serum osmolality elevated 312.

## 2024-10-22 NOTE — HOSPITAL COURSE
10/21>10/22 EEG: Recurrent right hemispheric seizures with significant improvement noted in frequency after Propofol initiated 10/21 pm.  10/22>10/23 EEG: Highly epileptogenic right hemispheric structural lesion and severe diffuse encephalopathy, 11 subclinical seizures arising from the right parietal/temporal region   10/23>10/24 EEG: No further seizures after escalation of Propofol on 10/23, highly epileptogenic right hemispheric structural lesion and a severe diffuse encephalopathy  10/24>10/25 EEG: Highly epileptogenic right hemispheric structural lesion and a severe diffuse encephalopathy, one subclinical seizure at 19:14 on 10/24  OFF FOR MRI  10/26>10/27 EE electrographic right onset seizure after Propofol discontinued, burst suppression pattern with epileptiform bursts  10/27>10/28 EEG: suppressed, no electrographic seizures  10/28>10/29 EEG: remains suppressed, occasional right sharps, no electrographic seizures  10/29>10/30 EEG: discontinuous record with diffuse slowing and brief periods of suppression consistent with severe diffuse encephalopathy, no seizures  10/30>10/31 EEG: discontinuous with diffuse slowing and brief periods of suppression c/w severe encephalopathy, no seizures    See EEG reports for details.

## 2024-10-22 NOTE — ASSESSMENT & PLAN NOTE
LEANDRO on CKD, Cr 1.8 on initial presentation to ED and now 2.2    Avoid nephrotoxic agents  Renally dose medications  Q 1 hour I&Os  Cr remains elevated to 2.5 (10/21); Na 155; will d/c valsartan and give 1 L LR   Cr decreased to 1.9 (10/22), Na still elevated at 156; will give enteral free water for free water deficit to bring Na to 150

## 2024-10-22 NOTE — SUBJECTIVE & OBJECTIVE
"Past Medical History:   Diagnosis Date    Allergy     Arthritis     CAD, multiple vessel     DR Melissa    Cataract     Depression     History of colon polyps     Hyperlipidemia     Hypertension     Macular degeneration     Dr Razo for injection, Jennifer for eye    S/P CABG x 2     Type 2 diabetes mellitus with circulatory disorder     Dr. Aquino       Past Surgical History:   Procedure Laterality Date    broken elbow      left    COLONOSCOPY N/A 10/4/2017    Procedure: COLONOSCOPY;  Surgeon: Gabriel Leung MD;  Location: [facility];  Service: Endoscopy;  Laterality: N/A;    CRANIOTOMY FOR EVACUATION OF SUBDURAL HEMATOMA Right 10/18/2024    Procedure: CRANIOTOMY, FOR SUBDURAL HEMATOMA EVACUATION;  Surgeon: Laurence Condon MD;  Location: [facility] OR [address] [place] [address];  Service: Neurosurgery;  Laterality: Right;    EYE SURGERY      cataract    heart bypass      2004    HERNIA REPAIR      right    ROTATOR CUFF REPAIR      right  2004       Review of patient's allergies indicates:   Allergen Reactions    Aricept odt [donepezil] Diarrhea and Nausea And Vomiting    Codeine Nausea Only     weak    Ranexa [ranolazine]        No current facility-administered medications on file prior to encounter.     Current Outpatient Medications on File Prior to Encounter   Medication Sig    acetaminophen (TYLENOL) 500 MG tablet Take 1 tablet (500 mg total) by mouth every 6 (six) hours as needed.    albuterol (PROVENTIL) 2.5 mg /3 mL (0.083 %) nebulizer solution Take 3 mLs (2.5 mg total) by nebulization every 6 to 8 hours as needed for Wheezing.    albuterol (PROVENTIL/VENTOLIN HFA) 90 mcg/actuation inhaler INHALE 2 PUFFS BY MOUTH EVERY 6 HOURS AS NEEDED FOR WHEEZING OR  SHORTNESS  OF  BREATH    atorvastatin (LIPITOR) 40 MG tablet Take 1 tablet by mouth every morning.    BD INSULIN PEN NEEDLE UF SHORT 31 gauge x 5/16" Ndle     BD INSULIN SYRINGE ULTRA-FINE 1/2 mL 31 gauge x 15/64" Syrg     blood sugar diagnostic Strp To check BG 3 times " "daily, to use with insurance preferred meter    carvediloL (COREG) 3.125 MG tablet Take 1 tablet (3.125 mg total) by mouth 2 (two) times daily with meals.    ceramides 1,3,6-II (CERAVE DAILY MOISTURIZING) Lotn Apply twice daily to skin    cinnamon bark 500 mg capsule Take 1,000 mg by mouth once daily.      clopidogreL (PLAVIX) 75 mg tablet Take 75 mg by mouth.    diclofenac sodium (VOLTAREN) 1 % Gel Apply 2 g topically 3 (three) times daily.    DULoxetine (CYMBALTA) 60 MG capsule Take 1 capsule (60 mg total) by mouth once daily.    fluticasone propionate (FLONASE) 50 mcg/actuation nasal spray 1 spray (50 mcg total) by Each Nostril route 2 (two) times daily.    furosemide (LASIX) 40 MG tablet Take 40 mg by mouth once daily.    gabapentin (NEURONTIN) 300 MG capsule Take 4 capsules (1,200 mg total) by mouth 2 (two) times daily.    glimepiride (AMARYL) 4 MG tablet Take 4 mg by mouth daily with breakfast.     HYDROcodone-acetaminophen (NORCO) 7.5-325 mg per tablet Take 1 tablet by mouth every 8 (eight) hours as needed for Pain.    [START ON 11/3/2024] HYDROcodone-acetaminophen (NORCO) 7.5-325 mg per tablet Take 1 tablet by mouth every 8 (eight) hours as needed for Pain.    insulin NPH-insulin regular, 70/30, (NOVOLIN 70/30) 100 unit/mL (70-30) injection Inject into the skin. Inject 10 units at breakfast and inject 10 units at night    insulin syringe-needle U-100 0.3 mL 31 gauge x 15/64" Syrg USE TO INJECT INSULIN THREE TIMES DAILY    insulin syringe-needle U-100 1/2 mL 31 x 5/16" Syrg     isosorbide mononitrate (IMDUR) 30 MG 24 hr tablet Take 30 mg by mouth once daily.    lancets Misc To check BG 3 times daily, to use with insurance preferred meter    levETIRAcetam (KEPPRA) 500 MG Tab Take 1 tablet (500 mg total) by mouth 2 (two) times daily. for 7 days    LIDOcaine (LIDODERM) 5 % Place 1 patch onto the skin once daily. Remove & Discard patch within 12 hours or as directed by MD    meclizine (ANTIVERT) 12.5 mg tablet " Take 1 tablet (12.5 mg total) by mouth 3 (three) times daily as needed for Dizziness.    methocarbamoL (ROBAXIN) 500 MG Tab TAKE 1 TABLET BY MOUTH 4 TIMES DAILY FOR 10 DAYS    mupirocin (BACTROBAN) 2 % ointment Apply topically 3 (three) times daily.    nitroGLYCERIN (NITROSTAT) 0.4 MG SL tablet DISSOLVE 1 TABLET UNDER THE TONGUE EVERY 5 MINUTES AS  NEEDED FOR CHEST PAIN. MAX  OF 3 TABLETS IN 15 MINUTES. CALL 911 IF PAIN PERSISTS.    omeprazole (PRILOSEC) 20 MG capsule Take 1 capsule (20 mg total) by mouth once daily.    pravastatin (PRAVACHOL) 20 MG tablet Take 1 tablet (20 mg total) by mouth once daily.    spironolactone (ALDACTONE) 25 MG tablet Take 0.5 tablets (12.5 mg total) by mouth once daily.    tiotropium-olodateroL (STIOLTO RESPIMAT) 2.5-2.5 mcg/actuation Mist Inhale 2 puffs into the lungs once daily. Controller    triamcinolone acetonide 0.1% (KENALOG) 0.1 % cream SMARTSI Application Topical 2-3 Times Daily    valsartan (DIOVAN) 40 MG tablet Take 1 tablet (40 mg total) by mouth once daily.    VIT C/VIT E AC/LUT/COPPER/ZINC (PRESERVISION LUTEIN ORAL) Take by mouth.    warfarin (COUMADIN) 5 MG tablet Take 2 tabs by mouth on Tuesday,Thursday, Saturday and Sundays then 1.5 on all other days.     Continuous Infusions:   insulin regular 1 units/mL infusion orderable (DKA)  0-52 Units/hr Intravenous Continuous 1.5 mL/hr at 10/22/24 1308 1.5 Units/hr at 10/22/24 1308    propofol  40 mcg/kg/min Intravenous Continuous 20 mL/hr at 10/22/24 1302 40 mcg/kg/min at 10/22/24 1302       Family History       Problem Relation (Age of Onset)    Arthritis Mother    Cancer Brother, Maternal Grandfather    Diabetes Mother, Maternal Aunt, Maternal Uncle, Paternal Aunt    Heart attack Father    Heart disease Maternal Uncle, Paternal Aunt, Paternal Uncle, Maternal Grandmother    Stroke Mother    Throat cancer Brother          Tobacco Use    Smoking status: Former     Current packs/day: 0.00     Average packs/day: 3.0 packs/day  for 45.0 years (135.0 ttl pk-yrs)     Types: Cigarettes     Start date: 1959     Quit date: 2004     Years since quittin.5    Smokeless tobacco: Former   Substance and Sexual Activity    Alcohol use: Yes     Comment: was heavy drinker 35 years ago, rare use now    Drug use: No    Sexual activity: Yes     Partners: Female     Review of Systems   Unable to perform ROS: Intubated     Objective:     Vital Signs (Most Recent):  Temp: 98 °F (36.7 °C) (10/22/24 1102)  Pulse: 77 (10/22/24 1302)  Resp: (!) 21 (10/22/24 1302)  BP: (!) 114/56 (10/22/24 1302)  SpO2: 97 % (10/22/24 1302) Vital Signs (24h Range):  Temp:  [97.4 °F (36.3 °C)-99.7 °F (37.6 °C)] 98 °F (36.7 °C)  Pulse:  [] 77  Resp:  [14-23] 21  SpO2:  [95 %-100 %] 97 %  BP: ()/(50-69) 114/56  Arterial Line BP: ()/(38-61) 137/49     Weight: 83.5 kg (184 lb)  Body mass index is 27.98 kg/m².     Physical Exam  Vitals and nursing note reviewed.   Constitutional:       Appearance: He is ill-appearing. He is not diaphoretic.   HENT:      Head:      Comments: EEG in place  R periorbital ecchymosis  Eyes:      Pupils: Pupils are equal, round, and reactive to light.   Pulmonary:      Effort: Pulmonary effort is normal. No respiratory distress.   Musculoskeletal:      Cervical back: Neck supple.   Skin:     General: Skin is warm and dry.   Psychiatric:      Comments: Unable to assess            NEUROLOGICAL EXAMINATION:     CRANIAL NERVES     CN III, IV, VI   Pupils are equal, round, and reactive to light.       CN:   AQUILINO OU   Corneal intact OU   No spontaneous eye opening   Withdraws to noxious stimuli BLE    Exam findings to suggest seizures:  Myoclonus - no   eye twitching - no   Nystagmus - no   gaze deviation - no   waxy rigidity - no         Significant Labs: All pertinent lab results from the past 24 hours have been reviewed.    Significant Studies: I have reviewed all pertinent imaging results/findings within the past 24 hours.

## 2024-10-22 NOTE — PROGRESS NOTES
[facility]  Neurosurgery  Progress Note    Subjective:     History of Present Illness: Percy Ramirez is a 83 y.o. 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 Pt initially presented to  ED 2 days ago after mechanical fall and was found to have small SDH. Repeat scan was stable and patient was discharged home with outpatient follow up and instructions to hold his blood thinners. He re-presented to  ED yesterday after suffering another unwitnessed fall and family noticing him becoming more lethargic with c/o neck and head pain. Blood glucose >600 at OSH. CTH was indicative of 5cm right frontal IPC with 7mm midline shift. K-centra was given at OSH and pt was transferred to [facility] for higher level of care. Upon arrival pt INR was 1.2. Pt has waxing and waning exam, at times oriented and following commands and at other times lethargic, disoriented, and not responding. Further history limited due to patient's mental status change.    Post-Op Info:  Procedure(s) (LRB):  CRANIOTOMY, FOR SUBDURAL HEMATOMA EVACUATION (Right)   4 Days Post-Op   Interval History: 10/22/2024: Remains intubated with brainstem reflexes intact. Repeat CTH yesterday stable. MRI pending to look for underlying amyloid. No surgery to be offered at this time.    Medications:  Continuous Infusions:   insulin regular 1 units/mL infusion orderable (DKA)  0-52 Units/hr Intravenous Continuous   Stopped at 10/22/24 0810    nicardipine  0-15 mg/hr Intravenous Continuous 12.5 mL/hr at 10/21/24 2219 2.5 mg/hr at 10/21/24 2219    NORepinephrine bitartrate-D5W  0-3 mcg/kg/min Intravenous Continuous 6.3 mL/hr at 10/22/24 0202 0.02 mcg/kg/min at 10/22/24 0202    propofol  30 mcg/kg/min Intravenous Continuous 15 mL/hr at 10/22/24 0902 30 mcg/kg/min at 10/22/24 0902     Scheduled Meds:   atorvastatin  40 mg Per OG tube Daily     carvediloL  12.5 mg Per OG tube BID WM    ceFAZolin (Ancef) IV (PEDS and ADULTS)  2 g Intravenous Q8H    famotidine  20 mg Per OG tube Daily    lacosamide (Vimpat) IV orderable  100 mg Intravenous Q12H    levETIRAcetam (Keppra) IV (PEDS and ADULTS)  1,000 mg Intravenous Q12H    mupirocin   Nasal BID    polyethylene glycol  17 g Per OG tube Daily    senna-docusate 8.6-50 mg  1 tablet Per OG tube BID     PRN Meds:  Current Facility-Administered Medications:     0.9%  NaCl infusion (for blood administration), , Intravenous, Q24H PRN    acetaminophen, 650 mg, Per OG tube, Q6H PRN    dextrose 10%, 12.5 g, Intravenous, PRN    dextrose 10%, 25 g, Intravenous, PRN    hydrALAZINE, 10 mg, Intravenous, Q6H PRN    labetalol, 10 mg, Intravenous, Q6H PRN    ondansetron, 4 mg, Intravenous, Q8H PRN     Review of Systems  Objective:     Weight: 83.5 kg (184 lb)  Body mass index is 27.98 kg/m².  Vital Signs (Most Recent):  Temp: 97.8 °F (36.6 °C) (10/22/24 0845)  Pulse: 83 (10/22/24 0902)  Resp: 18 (10/22/24 0902)  BP: 125/69 (10/22/24 0902)  SpO2: 97 % (10/22/24 0902) Vital Signs (24h Range):  Temp:  [97.4 °F (36.3 °C)-99.7 °F (37.6 °C)] 97.8 °F (36.6 °C)  Pulse:  [] 83  Resp:  [14-23] 18  SpO2:  [95 %-100 %] 97 %  BP: ()/(50-77) 125/69  Arterial Line BP: ()/(38-77) 150/53     Date 10/22/24 0700 - 10/23/24 0659   Shift 2462-7431 2867-5476 4800-6889 24 Hour Total   INTAKE   I.V.(mL/kg) 57.7(0.7)   57.7(0.7)   Blood 359.6   359.6   NG/   345   Shift Total(mL/kg) 762.3(9.1)   762.3(9.1)   OUTPUT   Shift Total(mL/kg)       Weight (kg) 83.5 83.5 83.5 83.5              Vent Mode: Spont  Oxygen Concentration (%):  [40] 40  Resp Rate Total:  [14 br/min-21 br/min] 17 br/min  PEEP/CPAP:  [5 cmH20] 5 cmH20  Pressure Support:  [5 cmH20] 5 cmH20  Mean Airway Pressure:  [7.2 cmH20-7.7 cmH20] 7.5 cmH20             Closed/Suction Drain 10/19/24 Tube - 1 Right Scalp Accordion 10 Fr. (Active)   Site Description Unable to view  10/22/24 0502   Dressing Type Gauze 10/22/24 0502   Dressing Status Clean;Dry;Intact 10/22/24 0502   Dressing Intervention Integrity maintained 10/22/24 0502   Drainage Bloody 10/22/24 0502   Status To bulb suction 10/22/24 0502   Output (mL) 35 mL 10/22/24 0302            Closed/Suction Drain 10/19/24 Tube - 2 Right Scalp Bulb 10 Fr. (Active)   Site Description Unable to view 10/22/24 0502   Dressing Type Gauze 10/22/24 0502   Dressing Status Clean;Dry;Intact 10/22/24 0502   Dressing Intervention Integrity maintained 10/22/24 0502   Drainage None 10/22/24 0502   Status To bulb suction 10/22/24 0502   Output (mL) 11 mL 10/22/24 0302            NG/OG Tube 10/19/24 1200 Right mouth (Active)   Placement Check placement verified by x-ray 10/22/24 0502   Tolerance no signs/symptoms of discomfort 10/21/24 1701   Securement secured to commercial device 10/22/24 0502   Clamp Status/Tolerance unclamped 10/22/24 0502   Suction Setting/Drainage Method suction at the bedside 10/21/24 1701   Insertion Site Appearance no redness, warmth, tenderness, skin breakdown, drainage 10/22/24 0502   Drainage None 10/22/24 0502   Flush/Irrigation flushed w/;water;no resistance met 10/22/24 0502   Feeding Type continuous 10/22/24 0502   Feeding Action feeding continued 10/22/24 0502   Current Rate (mL/hr) 65 mL/hr 10/21/24 1902   Goal Rate (mL/hr) 65 mL/hr 10/21/24 1902   Intake (mL) 65 mL 10/22/24 0602   Water Bolus (mL) 150 mL 10/22/24 0802   Formula Name diabetic isosource 10/22/24 0502   Intake (mL) - Formula Tube Feeding 65 10/22/24 0902       Male External Urinary Catheter 10/20/24 1934 (Active)   Collection Container Urimeter 10/22/24 0502   Securement Method secured to top of thigh w/ adhesive device 10/22/24 0502   Skin no redness;no breakdown 10/22/24 0502   Tolerance no signs/symptoms of discomfort 10/22/24 0502   Output (mL) 5 mL 10/22/24 0602   Catheter Change Date 10/22/24 10/22/24 0502   Catheter Change Time 0302 10/22/24 0502  "    Neurosurgery Physical Exam    [id number] on prop and EEG  PERRL  +c/g/c  WD BLE and RUE  No movement in LUE    Significant Labs:  Recent Labs   Lab 10/21/24  0321 10/21/24  0613 10/22/24  0002 10/22/24  0130 10/22/24  0512   *  --   --  209*  --    *   < > 156* 157* 156*   K 3.7  --   --  4.0  --    *  --   --  127*  --    CO2 19*  --   --  22*  --    BUN 57*  --   --  54*  --    CREATININE 2.5*  --   --  1.9*  --    CALCIUM 8.0*  --   --  7.6*  --    MG 2.6  --   --  2.7*  --     < > = values in this interval not displayed.     Recent Labs   Lab 10/21/24  0321 10/21/24  0330 10/22/24  0002 10/22/24  0130   WBC 13.13*  --  8.37 9.05   HGB 8.1*  --  6.6* 6.7*   HCT 27.1* 22* 22.1* 22.6*     --  151 154     No results for input(s): "LABPT", "INR", "APTT" in the last 48 hours.  Microbiology Results (last 7 days)       Procedure Component Value Units Date/Time    Blood culture [[id number]] Collected: 10/18/24 0345    Order Status: Completed Specimen: Blood from Peripheral, Hand, Left Updated: 10/22/24 0612     Blood Culture, Routine No Growth to date      No Growth to date      No Growth to date      No Growth to date      No Growth to date    Blood culture [[id number]] Collected: 10/18/24 0335    Order Status: Completed Specimen: Blood from Peripheral, Foot, Right Updated: 10/22/24 0612     Blood Culture, Routine No Growth to date      No Growth to date      No Growth to date      No Growth to date      No Growth to date    Culture, Respiratory with Gram Stain [[id number]]     Order Status: No result Specimen: Respiratory           All pertinent labs from the last 24 hours have been reviewed.    Significant Diagnostics:  I have reviewed all pertinent imaging results/findings within the past 24 hours.  Assessment/Plan:     * Traumatic right-sided intracerebral hemorrhage without loss of consciousness  84 y.o. male w/ PMH of CABG x2 on Coumadin, HTN, T2DM, HLD who presents with R frontal ICH " (ICH score 2) s/p fall. Given K-centra at OSH.     Taken to OR 10/18 for R frontal IPH evac via eyebrow supraorbital craniotomy. Unfortunately, interval development of large SDH on post op scan, taken back to OR emergently for acute SDH evacuation.     Imaging:  -CTH OSH 10/17: 5cm R frontal ICH with 7mm MLS and some intraventricular blood in posterior lateral vent   -CT Csp OSH 10/17: no acute processes   -rCTH 10/18: grossly stable   -CTA 10/18: post op clot evac with new holoconvexity R SDH, 12 mm MLS   -Post op CTH 10/18: s/p SDH evacuation with appropriate evac, CTA neg   -CTH 10/21: new/increased hyperdensity in the resection cavity without worsening mass effect or midline shift   -[facility] 10/21: stable    S/p R SDH evac on 10/18    Plan:  Patient admitted to ICU on telemetry, q1h neuro checks  Hold anti-plt/coag medications. Given K-centra for reversal at OSH. INR 1.2 on arrival  MRI brain w wo to look for amyloid, ordered  SBP <140   Na >135  Keppra 1g BID given burden of hemorrhage  LEANDRO management per NCC  Keep drains today  No HOB restrictions  SDD to thumbprint, SG drain to full suction; abx while in place  WTE plan per NCC  Continue to monitor clinically, notify NSGY immediately with any changes in neuro status    Plan discussed with Dr. Condon    Dispo: admitted to ICU            Haider Goel MD  Neurosurgery  [facility]

## 2024-10-23 PROBLEM — I62.9 INTRACRANIAL HEMORRHAGE: Status: ACTIVE | Noted: 2024-10-23

## 2024-10-23 LAB
ALBUMIN SERPL BCP-MCNC: 2.1 G/DL (ref 3.5–5.2)
ALLENS TEST: ABNORMAL
ALP SERPL-CCNC: 77 U/L (ref 40–150)
ALT SERPL W/O P-5'-P-CCNC: 8 U/L (ref 10–44)
ANION GAP SERPL CALC-SCNC: 7 MMOL/L (ref 8–16)
AST SERPL-CCNC: 14 U/L (ref 10–40)
BACTERIA BLD CULT: NORMAL
BACTERIA BLD CULT: NORMAL
BASOPHILS # BLD AUTO: 0.02 K/UL (ref 0–0.2)
BASOPHILS NFR BLD: 0.2 % (ref 0–1.9)
BILIRUB SERPL-MCNC: 0.3 MG/DL (ref 0.1–1)
BUN SERPL-MCNC: 47 MG/DL (ref 8–23)
CALCIUM SERPL-MCNC: 8 MG/DL (ref 8.7–10.5)
CHLORIDE SERPL-SCNC: 126 MMOL/L (ref 95–110)
CO2 SERPL-SCNC: 23 MMOL/L (ref 23–29)
CREAT SERPL-MCNC: 1.4 MG/DL (ref 0.5–1.4)
DELSYS: ABNORMAL
DIFFERENTIAL METHOD BLD: ABNORMAL
EOSINOPHIL # BLD AUTO: 0.3 K/UL (ref 0–0.5)
EOSINOPHIL NFR BLD: 3.6 % (ref 0–8)
ERYTHROCYTE [DISTWIDTH] IN BLOOD BY AUTOMATED COUNT: 18.3 % (ref 11.5–14.5)
ERYTHROCYTE [SEDIMENTATION RATE] IN BLOOD BY WESTERGREN METHOD: 16 MM/H
EST. GFR  (NO RACE VARIABLE): 50.2 ML/MIN/1.73 M^2
FIO2: 40
GLUCOSE SERPL-MCNC: 157 MG/DL (ref 70–110)
HCO3 UR-SCNC: 25.8 MMOL/L (ref 24–28)
HCT VFR BLD AUTO: 27.7 % (ref 40–54)
HGB BLD-MCNC: 8.3 G/DL (ref 14–18)
IMM GRANULOCYTES # BLD AUTO: 0.14 K/UL (ref 0–0.04)
IMM GRANULOCYTES NFR BLD AUTO: 1.7 % (ref 0–0.5)
LYMPHOCYTES # BLD AUTO: 0.4 K/UL (ref 1–4.8)
LYMPHOCYTES NFR BLD: 4.7 % (ref 18–48)
MAGNESIUM SERPL-MCNC: 3 MG/DL (ref 1.6–2.6)
MCH RBC QN AUTO: 27.3 PG (ref 27–31)
MCHC RBC AUTO-ENTMCNC: 30 G/DL (ref 32–36)
MCV RBC AUTO: 91 FL (ref 82–98)
MODE: ABNORMAL
MONOCYTES # BLD AUTO: 0.9 K/UL (ref 0.3–1)
MONOCYTES NFR BLD: 10.3 % (ref 4–15)
NEUTROPHILS # BLD AUTO: 6.7 K/UL (ref 1.8–7.7)
NEUTROPHILS NFR BLD: 79.5 % (ref 38–73)
NRBC BLD-RTO: 0 /100 WBC
OHS QRS DURATION: 114 MS
OHS QTC CALCULATION: 435 MS
PCO2 BLDA: 41.4 MMHG (ref 35–45)
PEEP: 5
PH SMN: 7.4 [PH] (ref 7.35–7.45)
PHOSPHATE SERPL-MCNC: 1.6 MG/DL (ref 2.7–4.5)
PLATELET # BLD AUTO: 139 K/UL (ref 150–450)
PMV BLD AUTO: 10.3 FL (ref 9.2–12.9)
PO2 BLDA: 101 MMHG (ref 80–100)
POC BE: 1 MMOL/L
POC SATURATED O2: 98 % (ref 95–100)
POC TCO2: 27 MMOL/L (ref 23–27)
POCT GLUCOSE: 114 MG/DL (ref 70–110)
POCT GLUCOSE: 120 MG/DL (ref 70–110)
POCT GLUCOSE: 120 MG/DL (ref 70–110)
POCT GLUCOSE: 121 MG/DL (ref 70–110)
POCT GLUCOSE: 131 MG/DL (ref 70–110)
POCT GLUCOSE: 135 MG/DL (ref 70–110)
POCT GLUCOSE: 148 MG/DL (ref 70–110)
POCT GLUCOSE: 150 MG/DL (ref 70–110)
POCT GLUCOSE: 161 MG/DL (ref 70–110)
POCT GLUCOSE: 166 MG/DL (ref 70–110)
POCT GLUCOSE: 172 MG/DL (ref 70–110)
POCT GLUCOSE: 174 MG/DL (ref 70–110)
POCT GLUCOSE: 178 MG/DL (ref 70–110)
POCT GLUCOSE: 181 MG/DL (ref 70–110)
POCT GLUCOSE: 182 MG/DL (ref 70–110)
POCT GLUCOSE: 183 MG/DL (ref 70–110)
POCT GLUCOSE: 187 MG/DL (ref 70–110)
POCT GLUCOSE: 189 MG/DL (ref 70–110)
POCT GLUCOSE: 192 MG/DL (ref 70–110)
POCT GLUCOSE: 199 MG/DL (ref 70–110)
POCT GLUCOSE: 203 MG/DL (ref 70–110)
POCT GLUCOSE: 204 MG/DL (ref 70–110)
POCT GLUCOSE: 213 MG/DL (ref 70–110)
POCT GLUCOSE: 226 MG/DL (ref 70–110)
POTASSIUM SERPL-SCNC: 3.8 MMOL/L (ref 3.5–5.1)
PROT SERPL-MCNC: 5.5 G/DL (ref 6–8.4)
RBC # BLD AUTO: 3.04 M/UL (ref 4.6–6.2)
SAMPLE: ABNORMAL
SITE: ABNORMAL
SODIUM SERPL-SCNC: 152 MMOL/L (ref 136–145)
SODIUM SERPL-SCNC: 153 MMOL/L (ref 136–145)
SODIUM SERPL-SCNC: 156 MMOL/L (ref 136–145)
TRIGL SERPL-MCNC: 209 MG/DL (ref 30–150)
VT: 500
WBC # BLD AUTO: 8.37 K/UL (ref 3.9–12.7)

## 2024-10-23 PROCEDURE — 20000000 HC ICU ROOM

## 2024-10-23 PROCEDURE — 37799 UNLISTED PX VASCULAR SURGERY: CPT

## 2024-10-23 PROCEDURE — 99900035 HC TECH TIME PER 15 MIN (STAT)

## 2024-10-23 PROCEDURE — 25000003 PHARM REV CODE 250

## 2024-10-23 PROCEDURE — C9254 INJECTION, LACOSAMIDE: HCPCS

## 2024-10-23 PROCEDURE — 84295 ASSAY OF SERUM SODIUM: CPT | Mod: 91

## 2024-10-23 PROCEDURE — 99233 SBSQ HOSP IP/OBS HIGH 50: CPT | Mod: ,,, | Performed by: PHYSICIAN ASSISTANT

## 2024-10-23 PROCEDURE — 63600175 PHARM REV CODE 636 W HCPCS

## 2024-10-23 PROCEDURE — 84478 ASSAY OF TRIGLYCERIDES: CPT | Performed by: PSYCHIATRY & NEUROLOGY

## 2024-10-23 PROCEDURE — 51798 US URINE CAPACITY MEASURE: CPT

## 2024-10-23 PROCEDURE — 27100171 HC OXYGEN HIGH FLOW UP TO 24 HOURS

## 2024-10-23 PROCEDURE — 99900026 HC AIRWAY MAINTENANCE (STAT)

## 2024-10-23 PROCEDURE — 63600175 PHARM REV CODE 636 W HCPCS: Performed by: PSYCHIATRY & NEUROLOGY

## 2024-10-23 PROCEDURE — 95720 EEG PHY/QHP EA INCR W/VEEG: CPT | Mod: ,,, | Performed by: STUDENT IN AN ORGANIZED HEALTH CARE EDUCATION/TRAINING PROGRAM

## 2024-10-23 PROCEDURE — 25000003 PHARM REV CODE 250: Performed by: STUDENT IN AN ORGANIZED HEALTH CARE EDUCATION/TRAINING PROGRAM

## 2024-10-23 PROCEDURE — 82803 BLOOD GASES ANY COMBINATION: CPT

## 2024-10-23 PROCEDURE — 85025 COMPLETE CBC W/AUTO DIFF WBC: CPT

## 2024-10-23 PROCEDURE — 94003 VENT MGMT INPAT SUBQ DAY: CPT

## 2024-10-23 PROCEDURE — 93010 ELECTROCARDIOGRAM REPORT: CPT | Mod: ,,, | Performed by: INTERNAL MEDICINE

## 2024-10-23 PROCEDURE — 83735 ASSAY OF MAGNESIUM: CPT

## 2024-10-23 PROCEDURE — 95714 VEEG EA 12-26 HR UNMNTR: CPT

## 2024-10-23 PROCEDURE — 51701 INSERT BLADDER CATHETER: CPT

## 2024-10-23 PROCEDURE — 27200966 HC CLOSED SUCTION SYSTEM

## 2024-10-23 PROCEDURE — 94761 N-INVAS EAR/PLS OXIMETRY MLT: CPT

## 2024-10-23 PROCEDURE — 93005 ELECTROCARDIOGRAM TRACING: CPT

## 2024-10-23 PROCEDURE — 84100 ASSAY OF PHOSPHORUS: CPT

## 2024-10-23 PROCEDURE — 80053 COMPREHEN METABOLIC PANEL: CPT

## 2024-10-23 PROCEDURE — 99291 CRITICAL CARE FIRST HOUR: CPT | Mod: ,,, | Performed by: PSYCHIATRY & NEUROLOGY

## 2024-10-23 RX ORDER — SODIUM,POTASSIUM PHOSPHATES 280-250MG
2 POWDER IN PACKET (EA) ORAL
Status: DISCONTINUED | OUTPATIENT
Start: 2024-10-23 | End: 2024-11-01

## 2024-10-23 RX ORDER — POLYETHYLENE GLYCOL 3350 17 G/17G
17 POWDER, FOR SOLUTION ORAL DAILY PRN
Status: DISCONTINUED | OUTPATIENT
Start: 2024-10-23 | End: 2024-10-23

## 2024-10-23 RX ORDER — NOREPINEPHRINE BITARTRATE 0.02 MG/ML
0-1 INJECTION, SOLUTION INTRAVENOUS CONTINUOUS
Status: DISCONTINUED | OUTPATIENT
Start: 2024-10-23 | End: 2024-10-24

## 2024-10-23 RX ORDER — LACOSAMIDE 10 MG/ML
200 INJECTION, SOLUTION INTRAVENOUS EVERY 12 HOURS
Status: DISCONTINUED | OUTPATIENT
Start: 2024-10-23 | End: 2024-10-30

## 2024-10-23 RX ORDER — LACOSAMIDE 10 MG/ML
100 INJECTION, SOLUTION INTRAVENOUS ONCE
Status: COMPLETED | OUTPATIENT
Start: 2024-10-23 | End: 2024-10-23

## 2024-10-23 RX ADMIN — SILODOSIN 4 MG: 4 CAPSULE ORAL at 08:10

## 2024-10-23 RX ADMIN — LACOSAMIDE 200 MG: 10 INJECTION INTRAVENOUS at 08:10

## 2024-10-23 RX ADMIN — POTASSIUM & SODIUM PHOSPHATES POWDER PACK 280-160-250 MG 2 PACKET: 280-160-250 PACK at 12:10

## 2024-10-23 RX ADMIN — POTASSIUM & SODIUM PHOSPHATES POWDER PACK 280-160-250 MG 2 PACKET: 280-160-250 PACK at 04:10

## 2024-10-23 RX ADMIN — FAMOTIDINE 20 MG: 20 TABLET ORAL at 08:10

## 2024-10-23 RX ADMIN — POTASSIUM & SODIUM PHOSPHATES POWDER PACK 280-160-250 MG 2 PACKET: 280-160-250 PACK at 08:10

## 2024-10-23 RX ADMIN — HEPARIN SODIUM 5000 UNITS: 5000 INJECTION INTRAVENOUS; SUBCUTANEOUS at 09:10

## 2024-10-23 RX ADMIN — CEFAZOLIN 2 G: 2 INJECTION, POWDER, FOR SOLUTION INTRAMUSCULAR; INTRAVENOUS at 01:10

## 2024-10-23 RX ADMIN — CEFAZOLIN 2 G: 2 INJECTION, POWDER, FOR SOLUTION INTRAMUSCULAR; INTRAVENOUS at 10:10

## 2024-10-23 RX ADMIN — LEVETIRACETAM 1000 MG: 100 INJECTION INTRAVENOUS at 08:10

## 2024-10-23 RX ADMIN — ATORVASTATIN CALCIUM 40 MG: 40 TABLET, FILM COATED ORAL at 08:10

## 2024-10-23 RX ADMIN — LACOSAMIDE 100 MG: 10 INJECTION INTRAVENOUS at 08:10

## 2024-10-23 RX ADMIN — HEPARIN SODIUM 5000 UNITS: 5000 INJECTION INTRAVENOUS; SUBCUTANEOUS at 05:10

## 2024-10-23 RX ADMIN — CEFAZOLIN 2 G: 2 INJECTION, POWDER, FOR SOLUTION INTRAMUSCULAR; INTRAVENOUS at 05:10

## 2024-10-23 RX ADMIN — CARVEDILOL 12.5 MG: 12.5 TABLET, FILM COATED ORAL at 08:10

## 2024-10-23 RX ADMIN — HEPARIN SODIUM 5000 UNITS: 5000 INJECTION INTRAVENOUS; SUBCUTANEOUS at 04:10

## 2024-10-23 RX ADMIN — LACOSAMIDE 100 MG: 10 INJECTION INTRAVENOUS at 10:10

## 2024-10-23 NOTE — PLAN OF CARE
Recommendation/Intervention:   1) Consider decreasing TF rate due to additional kcal being provided by Propofol, Diabetisource @ 50 mL/hr to provide 1440 kcal, 72 g protein, 982 mL fluid per day              - Additional 503 kcal provided by Propofol to total 1943 kcal/day  2) RD to monitor and follow-up as needed     Goals: Meet % EEN/EPN by RD follow up.  Nutrition Goal Status: goal met  Communication of RD Recs: other (comment) (POC)

## 2024-10-23 NOTE — ASSESSMENT & PLAN NOTE
84 year old man with significant past medical history of HTN, HLD, DM2, CAD s/p CABG x2, Afib on Coumadin, ILD on home O2, HFrEF admitted to [facility] as transfer from OSH For evaluation and management of large traumatic R frontal IPH now s/p R supraorbital craniotomy for hematoma evacuation 10/18 and R craniotomy for evacuation of post-op subdural hematoma 10/19. EEG initiated 10/21 showing right hemispheric seizures.    - Management per NCC, NSGY, planning for MRI brain for further evaluation  - Drains in place

## 2024-10-23 NOTE — CONSULTS
"[facility]  Adult Nutrition  Consult Note    SUMMARY     Recommendation/Intervention:   1) Consider decreasing TF rate due to additional kcal being provided by Propofol, Diabetisource @ 50 mL/hr to provide 1440 kcal, 72 g protein, 982 mL fluid per day   - Additional 503 kcal provided by Propofol to total 1943 kcal/day  2) RD to monitor and follow-up as needed    Goals: Meet % EEN/EPN by RD follow up.  Nutrition Goal Status: goal met  Communication of RD Recs: other (comment) (POC)    Assessment and Plan    Nutrition Problem  Inadequate oral intake     Related to (etiology):   Inability to consume sufficient nutrients, mechanical ventilation     Signs and Symptoms (as evidenced by):   NPO status     Interventions/Recommendations (treatment strategy):  Collab of nutrition care w/ other providers   Enteral nutrition     Nutrition Diagnosis Status:   Continues    Reason for Assessment    Reason For Assessment: consult  Diagnosis: other (see comments) (Traumatic right-sided intracerebral hemorrhage without loss of consciousness)  General Information Comments: Pt followed by RD team. New consult received- pt receiving TF and Propofol started. Pt now intubated, s/p craniotomy. TF infusing at goal rate ordered of 65 mL/hr  Nutrition Discharge Planning: Pending clinical course    Nutrition Risk Screen    Nutrition Risk Screen: tube feeding or parenteral nutrition    Nutrition/Diet History    Typical Food/Fluid Intake: Breakfast: yogurt, bananas; Lunch: salad, sandwich; Dinner: katlin steak, mashed potatoes  Spiritual, Cultural Beliefs, Orthodox Practices, Values that Affect Care: no  Vitamin/Mineral/Herbal Supplements: Vit D  Food Allergies: NKFA  Factors Affecting Nutritional Intake: NPO    Anthropometrics    Temp: 97.3 °F (36.3 °C)  Height: 5' 8" (172.7 cm)  Height (inches): 68 in  Weight Method: Bed Scale  Weight: 83.5 kg (184 lb)  Weight (lb): 184 lb  Ideal Body Weight (IBW), Male: 154 " lb  % Ideal Body Weight, Male (lb): 119.48 %  BMI (Calculated): 28  BMI Grade: 25 - 29.9 - overweight  Usual Body Weight (UBW), k.82 kg  % Usual Body Weight: 102.22       Lab/Procedures/Meds    Pertinent Labs Reviewed: reviewed  Pertinent Labs Comments: Glucose: 189, Na: 152  Pertinent Medications Reviewed: reviewed   atorvastatin  40 mg Per OG tube Daily    carvediloL  12.5 mg Per OG tube BID WM    ceFAZolin (Ancef) IV (PEDS and ADULTS)  2 g Intravenous Q8H    famotidine  20 mg Per OG tube Daily    heparin (porcine)  5,000 Units Subcutaneous Q8H    lacosamide (Vimpat) IV orderable  200 mg Intravenous Q12H    levETIRAcetam (Keppra) IV (PEDS and ADULTS)  1,000 mg Intravenous Q12H    senna-docusate 8.6-50 mg  1 tablet Per OG tube Daily    silodosin  4 mg Per OG tube Daily     Estimated/Assessed Needs    Weight Used For Calorie Calculations: 83.5 kg (184 lb 1.4 oz)  Energy Calorie Requirements (kcal): 1720 kcal/day  Energy Need Method: [facility]  Protein Requirements:  (1.0-1.2 g/kg ABW)  Weight Used For Protein Calculations: 83.5 kg (184 lb 1.4 oz)  Fluid Requirements (mL): Per MD     RDA Method (mL): 1720  CHO Requirement: 237    Need re-estimated due to intubation    Nutrition Prescription Ordered    Current Diet Order: NPO  Current Nutrition Support Formula Ordered: Diabetisource   Current Nutrition Support Rate Ordered: 65 (ml)  Current Nutrition Support Frequency Ordered: mL/hr    Evaluation of Received Nutrient/Fluid Intake    Enteral Calories (kcal): 1872  Enteral Protein (gm): 94  Enteral (Free Water) Fluid (mL): 1276  Other Calories (kcal): 503  Total Calories (kcal): 2375  % Kcal Needs: 138%  % Protein Needs: 100%  I/O: + 6.2 L since admit  Energy Calories Required: exceeds needs  Protein Required: meeting needs  Fluid Required: other (see comments) (As per MD)  Comments: LBM 10/23  Tolerance: tolerating  % Intake of Estimated Energy Needs: 75 - 100 %  % Meal Intake: NPO    Nutrition  Risk    Level of Risk/Frequency of Follow-up:  (1x/week)       Monitor and Evaluation    Food and Nutrient Intake: energy intake, enteral nutrition intake  Food and Nutrient Adminstration: enteral and parenteral nutrition administration  Knowledge/Beliefs/Attitudes: food and nutrition knowledge/skill  Physical Activity and Function: nutrition-related ADLs and IADLs  Anthropometric Measurements: weight, weight change, body mass index  Biochemical Data, Medical Tests and Procedures: lipid profile, inflammatory profile, glucose/endocrine profile, gastrointestinal profile, electrolyte and renal panel  Nutrition-Focused Physical Findings: overall appearance       Nutrition Follow-Up    RD Follow-up?: Yes

## 2024-10-23 NOTE — PLAN OF CARE
[facility] Care Plan  POC reviewed with Percy Ramirez and his daughter at 1100. Family verbalized understanding. Questions and concerns addressed. No acute events today. Pt progressing toward goals. Will continue to monitor. See below and flowsheets for full assessment and VS info.     -propofol gtt @ 50 mcg/kg/min  -TF @ 50  -Added enteral water 400 cc q6h  -dc coreg  -vimpat increased to 200 mg  -straight cath x 2  -complete bath and linen change  -central line removed per MD        Is this a stroke patient? yes- Stroke booklet reviewed with family, risk factors identified for patient and stroke booklet remains at bedside for ongoing education.     Care individualization: yes    Neuro:  Federico Coma Scale  Best Eye Response: 1-->(E1) none  Best Motor Response: 4-->(M4) withdraws from pain  Best Verbal Response: 1-->(V1) none  Federico Coma Scale Score: 6  Assessment Qualifiers: patient chemically sedated or paralyzed, patient intubated  Pupil PERRLA: yes     24 hr Temp:  [97.1 °F (36.2 °C)-98 °F (36.7 °C)]     CV:   Rhythm: atrial rhythm  BP goals:   SBP < 140  MAP > 65    Resp:      Vent Mode: A/C  Set Rate: 16 BPM  Oxygen Concentration (%): 40  Vt Set: 500 mL  PEEP/CPAP: 5 cmH20  Pressure Support: 5 cmH20    Plan: wean to extubate    GI/:     Diet/Nutrition Received: tube feeding  Last Bowel Movement: 10/23/24  Voiding Characteristics: unable to void    Intake/Output Summary (Last 24 hours) at 10/23/2024 1829  Last data filed at 10/23/2024 1802  Gross per 24 hour   Intake 3534.01 ml   Output 1674 ml   Net 1860.01 ml     Unmeasured Output  Urine Occurrence: 1  Stool Occurrence: 1  Pad Count: 2    Labs/Accuchecks:  Recent Labs   Lab 10/23/24  0011   WBC 8.37   RBC 3.04*   HGB 8.3*   HCT 27.7*   *      Recent Labs   Lab 10/23/24  0011 10/23/24  0812   * 152*   K 3.8  --    CO2 23  --    *  --    BUN 47*  --    CREATININE 1.4  --    ALKPHOS 77  --    ALT 8*  --    AST 14  --    BILITOT 0.3  --        Recent Labs   Lab 10/19/24  0158   INR 1.1   APTT 25.0      Recent Labs   Lab 10/17/24  1928   TROPONINI 0.022       Electrolytes: N/A - electrolytes WDL  Accuchecks: Q1H    Gtts:   insulin regular 1 units/mL infusion orderable (DKA)  0-52 Units/hr Intravenous Continuous 6.5 mL/hr at 10/23/24 1602 6.5 Units/hr at 10/23/24 1602    NORepinephrine bitartrate-D5W  0-1 mcg/kg/min Intravenous Continuous        propofol  50 mcg/kg/min Intravenous Continuous 25.1 mL/hr at 10/23/24 1802 50 mcg/kg/min at 10/23/24 1802       LDA/Wounds:    Mykel Risk Assessment  Sensory Perception: 2-->very limited  Moisture: 3-->occasionally moist  Activity: 1-->bedfast  Mobility: 1-->completely immobile  Nutrition: 3-->adequate  Friction and Shear: 2-->potential problem  Mykel Score: 12    Is your mykel score 12 or less? no      Nurses Note -- 4 Eyes    Is there altered skin present?  yes     Please check the following boxes that apply:   [] LDA Added if Not in Epic (Describe Wound)   [] New Altered Skin Integrity was Present on Admit and Documented in LDA   [] Wound Image Taken    Wound Care Consulted? No     Second RN/Staff Member:  RADHA Capellan      Restraints:   Restraint Order  Length of Order: Order good for next 24 hours or when removed.  Date that the current order will : 10/24/24  Time that the current order will : 1503  Order Upon Application: Yes    [facility]

## 2024-10-23 NOTE — ASSESSMENT & PLAN NOTE
84 y.o. 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 Given K-centra at OSH.     Taken to OR 10/18 for R frontal IPH evac via eyebrow supraorbital craniotomy. Unfortunately, interval development of large SDH on post op scan, taken back to OR emergently for acute SDH evacuation.     Imaging:  -CTH OSH 10/17: 5cm R frontal ICH with 7mm MLS and some intraventricular blood in posterior lateral vent   -CT Csp OSH 10/17: no acute processes   -rCTH 10/18: grossly stable   -CTA 10/18: post op clot evac with new holoconvexity R SDH, 12 mm MLS   -Post op CTH 10/18: s/p SDH evacuation with appropriate evac, CTA neg   -CTH 10/21: new/increased hyperdensity in the resection cavity without worsening mass effect or midline shift   -rCTH 10/21: stable    S/p R SDH evac on 10/18    Plan:  Patient admitted to ICU on telemetry, q1h neuro checks  Hold anti-plt/coag medications. Given K-centra for reversal at OSH. INR 1.2 on arrival  MRI brain w wo to look for amyloid, ordered  SBP <140   Na >135  Keppra 1g q12, vimpat and prop added - f/u cEEG, management per NCC  LEANDRO management per NCC  Transfuse RBC for goal Hgb >7  No HOB restrictions  SDD to thumbprint, SG drain to full suction; abx while in place  WTE plan per NCC  Continue to monitor clinically, notify NSGY immediately with any changes in neuro status    Plan discussed with Dr. Condon    Dispo: admitted to ICU

## 2024-10-23 NOTE — PROGRESS NOTES
[facility] - Neuro Critical Care  Neurosurgery  Progress Note    Subjective:     History of Present Illness: Percy Ramirez is a 83 y.o. 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 Pt initially presented to  ED 2 days ago after mechanical fall and was found to have small SDH. Repeat scan was stable and patient was discharged home with outpatient follow up and instructions to hold his blood thinners. He re-presented to  ED yesterday after suffering another unwitnessed fall and family noticing him becoming more lethargic with c/o neck and head pain. Blood glucose >600 at OSH. CTH was indicative of 5cm right frontal IPC with 7mm midline shift. K-centra was given at OSH and pt was transferred to [facility] for higher level of care. Upon arrival pt INR was 1.2. Pt has waxing and waning exam, at times oriented and following commands and at other times lethargic, disoriented, and not responding. Further history limited due to patient's mental status change.    Post-Op Info:  Procedure(s) (LRB):  CRANIOTOMY, FOR SUBDURAL HEMATOMA EVACUATION (Right)   5 Days Post-Op   Interval History: 10/23: on EEG with R hemispheric seizures, added vimpat to keppra, on prop at 40.     Medications:  Continuous Infusions:   insulin regular 1 units/mL infusion orderable (DKA)  0-52 Units/hr Intravenous Continuous 2.4 mL/hr at 10/23/24 0611 2.4 Units/hr at 10/23/24 0611    propofol  40 mcg/kg/min Intravenous Continuous 20 mL/hr at 10/23/24 0611 40 mcg/kg/min at 10/23/24 0611     Scheduled Meds:   atorvastatin  40 mg Per OG tube Daily    carvediloL  12.5 mg Per OG tube BID WM    ceFAZolin (Ancef) IV (PEDS and ADULTS)  2 g Intravenous Q8H    famotidine  20 mg Per OG tube Daily    heparin (porcine)  5,000 Units Subcutaneous Q8H    lacosamide (Vimpat) IV orderable  100 mg Intravenous Q12H    levETIRAcetam (Keppra) IV (PEDS and ADULTS)   1,000 mg Intravenous Q12H    polyethylene glycol  17 g Per OG tube Daily    senna-docusate 8.6-50 mg  1 tablet Per OG tube Daily    silodosin  4 mg Per OG tube Daily     PRN Meds:  Current Facility-Administered Medications:     acetaminophen, 650 mg, Per OG tube, Q6H PRN    dextrose 10%, 12.5 g, Intravenous, PRN    dextrose 10%, 25 g, Intravenous, PRN    hydrALAZINE, 10 mg, Intravenous, Q4H PRN    labetalol, 10 mg, Intravenous, Q4H PRN    magnesium oxide, 800 mg, Per OG tube, PRN    magnesium oxide, 800 mg, Per OG tube, PRN    ondansetron, 4 mg, Intravenous, Q8H PRN    potassium bicarbonate, 35 mEq, Per OG tube, PRN    potassium bicarbonate, 50 mEq, Per OG tube, PRN    potassium bicarbonate, 60 mEq, Per OG tube, PRN    potassium, sodium phosphates, 2 packet, Per OG tube, PRN    potassium, sodium phosphates, 2 packet, Per OG tube, PRN    potassium, sodium phosphates, 2 packet, Per OG tube, PRN     Review of Systems  Objective:     Weight: 83.5 kg (184 lb)  Body mass index is 27.98 kg/m².  Vital Signs (Most Recent):  Temp: 97.1 °F (36.2 °C) (10/23/24 0302)  Pulse: 82 (10/23/24 0736)  Resp: 20 (10/23/24 0736)  BP: 132/66 (10/23/24 0736)  SpO2: 99 % (10/23/24 0736) Vital Signs (24h Range):  Temp:  [97.1 °F (36.2 °C)-98 °F (36.7 °C)] 97.1 °F (36.2 °C)  Pulse:  [74-93] 82  Resp:  [16-22] 20  SpO2:  [95 %-100 %] 99 %  BP: (114-153)/(56-76) 132/66  Arterial Line BP: (137-154)/(49-69) 153/69                Vent Mode: A/C  Oxygen Concentration (%):  [40] 40  Resp Rate Total:  [16 br/min-23 br/min] 22 br/min  Vt Set:  [500 mL] 500 mL  PEEP/CPAP:  [5 cmH20] 5 cmH20  Mean Airway Pressure:  [8.6 llS67-15 cmH20] 12 cmH20             Closed/Suction Drain 10/19/24 Tube - 1 Right Scalp Accordion 10 Fr. (Active)   Site Description Unable to view 10/23/24 0302   Dressing Type Gauze 10/23/24 0302   Dressing Status Clean;Dry;Intact 10/23/24 0302   Dressing Intervention Integrity maintained 10/23/24 0302   Drainage Bloody 10/23/24 0302    Status To bulb suction 10/23/24 0302   Output (mL) 20 mL 10/23/24 0602            Closed/Suction Drain 10/19/24 Tube - 2 Right Scalp Bulb 10 Fr. (Active)   Site Description Unable to view 10/23/24 0302   Dressing Type Gauze 10/23/24 0302   Dressing Status Clean;Dry;Intact 10/23/24 0302   Dressing Intervention Integrity maintained 10/23/24 0302   Drainage None 10/23/24 0302   Status To bulb suction 10/23/24 0302   Output (mL) 14 mL 10/23/24 0602            NG/OG Tube 10/19/24 1200 Right mouth (Active)   Placement Check placement verified by x-ray 10/23/24 0302   Tolerance no signs/symptoms of discomfort 10/22/24 1502   Securement secured to commercial device 10/23/24 0302   Clamp Status/Tolerance unclamped 10/23/24 0302   Suction Setting/Drainage Method suction at the bedside 10/23/24 0302   Insertion Site Appearance no redness, warmth, tenderness, skin breakdown, drainage 10/23/24 0302   Drainage None 10/23/24 0302   Flush/Irrigation flushed w/;water;no resistance met 10/23/24 0302   Feeding Type continuous 10/23/24 0302   Feeding Action feeding continued 10/23/24 0302   Current Rate (mL/hr) 65 mL/hr 10/23/24 0302   Goal Rate (mL/hr) 65 mL/hr 10/23/24 0302   Intake (mL) 40 mL 10/23/24 0402   Water Bolus (mL) 200 mL 10/23/24 0602   Formula Name diabetic isosource 10/23/24 0302   Intake (mL) - Formula Tube Feeding 65 10/23/24 0602   Residual Amount (ml) 15 ml 10/22/24 0702            Rectal Tube 10/22/24 1718 rectal tube w/ balloon (indicate number of mLs) (Active)   Stool Color Brown 10/23/24 0302   Insertion Site Appearance no redness, warmth, tenderness, skin breakdown, drainage 10/23/24 0302   Flush/Irrigation flushed w/;water;no resistance met 10/23/24 0302   Intake (mL) 40 mL 10/23/24 0202   Rectal Tube Output 300 mL 10/23/24 0602       Male External Urinary Catheter 10/20/24 1934 (Active)   Collection Container Urimeter 10/23/24 0302   Securement Method secured to top of thigh w/ adhesive device 10/23/24 0302  "  Skin no redness;no breakdown 10/23/24 0302   Tolerance no signs/symptoms of discomfort 10/23/24 0302   Output (mL) 0 mL 10/23/24 0602   Catheter Change Date 10/22/24 10/23/24 0302   Catheter Change Time 2202 10/23/24 0302          Physical Exam         Neurosurgery Physical Exam    [id number]  On EEG  Prop paused  PERRL  +c/g/c  LOC RUE  WD RLE>LLE  No movement in LUE    Significant Labs:  Recent Labs   Lab 10/22/24  0130 10/22/24  0512 10/22/24  1912 10/23/24  0011   *  --   --  157*   * 156* 152* 156*   K 4.0  --   --  3.8   *  --   --  126*   CO2 22*  --   --  23   BUN 54*  --   --  47*   CREATININE 1.9*  --   --  1.4   CALCIUM 7.6*  --   --  8.0*   MG 2.7*  --   --  3.0*     Recent Labs   Lab 10/22/24  0130 10/22/24  1008 10/23/24  0011   WBC 9.05 9.26 8.37   HGB 6.7* 8.1* 8.3*   HCT 22.6* 26.5* 27.7*    137* 139*     No results for input(s): "LABPT", "INR", "APTT" in the last 48 hours.  Microbiology Results (last 7 days)       Procedure Component Value Units Date/Time    Blood culture [[id number]] Collected: 10/18/24 0335    Order Status: Completed Specimen: Blood from Peripheral, Foot, Right Updated: 10/23/24 0612     Blood Culture, Routine No growth after 5 days.    Blood culture [[id number]] Collected: 10/18/24 0345    Order Status: Completed Specimen: Blood from Peripheral, Hand, Left Updated: 10/23/24 0612     Blood Culture, Routine No growth after 5 days.    Culture, Respiratory with Gram Stain [[id number]]     Order Status: No result Specimen: Respiratory           All pertinent labs from the last 24 hours have been reviewed.    Significant Diagnostics:  CT: No results found in the last 24 hours.  MRI: No results found in the last 24 hours.  Assessment/Plan:     * Traumatic right-sided intracerebral hemorrhage without loss of consciousness  84 y.o. 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 Given K-centra at OSH. "     Taken to OR 10/18 for R frontal IPH evac via eyebrow supraorbital craniotomy. Unfortunately, interval development of large SDH on post op scan, taken back to OR emergently for acute SDH evacuation.     Imaging:  -CTH OSH 10/17: 5cm R frontal ICH with 7mm MLS and some intraventricular blood in posterior lateral vent   -CT Csp OSH 10/17: no acute processes   -rCTH 10/18: grossly stable   -CTA 10/18: post op clot evac with new holoconvexity R SDH, 12 mm MLS   -Post op CTH 10/18: s/p SDH evacuation with appropriate evac, CTA neg   -CTH 10/21: new/increased hyperdensity in the resection cavity without worsening mass effect or midline shift   -rCTH 10/21: stable    S/p R SDH evac on 10/18    Plan:  Patient admitted to ICU on telemetry, q1h neuro checks  Hold anti-plt/coag medications. Given K-centra for reversal at OSH. INR 1.2 on arrival  MRI brain w wo to look for amyloid, ordered  SBP <140   Na >135  Keppra 1g q12, vimpat and prop added - f/u cEEG, management per NCC  LEANDRO management per NCC  Transfuse RBC for goal Hgb >7  No HOB restrictions  SDD to thumbprint, SG drain to full suction; abx while in place  WTE plan per NCC  Continue to monitor clinically, notify NSGY immediately with any changes in neuro status    Plan discussed with Dr. Condon    Dispo: admitted to ICU            Bhavya Granados MD  Neurosurgery  [facility] - Neuro Critical Care

## 2024-10-23 NOTE — ASSESSMENT & PLAN NOTE
In setting of R IPH s/p R supraorbital craniotomy for hematoma evacuation 10/18 and R craniotomy for evacuation of post-op subdural hematoma 10/19. EEG initiated 10/21 showing right hemispheric seizures.      Recommendations:  - Continuous vEEG monitoring - 11 seizures with right hemispheric onset 10/22>10/23   - Recommend to increase Lacosamide to 200 mg BID  - Propofol increased to 50 mKm, continue at this rate overnight  - Recommend to continue Levetiracetam 1000 mg BID  - Avoid agents that lower seizure threshold  - Management of infectious/metabolic abnormalities per NCC  - Seizure precautions    Discussed plan of care with NCC team. Will follow, please call with questions.  -

## 2024-10-23 NOTE — SUBJECTIVE & OBJECTIVE
Interval History: EEG 10/22>10/23 with 11 subclinical seizures arising from the right parietal/temporal region. Propofol and Lacosamide increased 10/23 am, EEG so far with no further seizures since increases.    Current Facility-Administered Medications   Medication Dose Route Frequency Provider Last Rate Last Admin    acetaminophen tablet 650 mg  650 mg Per OG tube Q6H PRN Saman Gonzalez MD   650 mg at 10/21/24 2316    atorvastatin tablet 40 mg  40 mg Per OG tube Daily Christina Em NP   40 mg at 10/23/24 0811    ceFAZolin SolR 2 g  2 g Intravenous Q8H Nam Reynoso MD   2 g at 10/23/24 1000    dextrose 10% bolus 125 mL 125 mL  12.5 g Intravenous PRN Nam Reynoso MD        dextrose 10% bolus 250 mL 250 mL  25 g Intravenous PRN Nam Reynoso MD        famotidine tablet 20 mg  20 mg Per OG tube Daily Saman Gonzalez MD   20 mg at 10/23/24 0811    heparin (porcine) injection 5,000 Units  5,000 Units Subcutaneous Q8H Khris Cote MD   5,000 Units at 10/23/24 0505    hydrALAZINE injection 10 mg  10 mg Intravenous Q4H PRN Radha Yan PA-C        insulin regular in 0.9 % NaCl 100 unit/100 mL (1 unit/mL) infusion  0-52 Units/hr Intravenous Continuous Nam Reynoso MD 3.5 mL/hr at 10/23/24 0902 3.5 Units/hr at 10/23/24 0902    labetalol 20 mg/4 mL (5 mg/mL) IV syring  10 mg Intravenous Q4H PRN Radha Yan PA-C        lacosamide injection 200 mg  200 mg Intravenous Q12H Dolores Moore DO        levETIRAcetam injection 1,000 mg  1,000 mg Intravenous Q12H Nam Reynoso MD   1,000 mg at 10/23/24 0811    magnesium oxide split tablet 800 mg  800 mg Per OG tube PRN Radha Yan PA-C        magnesium oxide split tablet 800 mg  800 mg Per OG tube PRN Radha Yan PA-C        NORepinephrine 4 mg in sodium chloride 0.9% 250 mL infusion (premix)  0-1 mcg/kg/min Intravenous Continuous Dolores Moore DO        ondansetron injection 4 mg  4 mg Intravenous Q8H PRN Mickal,  TIRSO Ortiz        potassium bicarbonate disintegrating tablet 35 mEq  35 mEq Per OG tube PRN Radha Yan, PA-WHIT        potassium bicarbonate disintegrating tablet 50 mEq  50 mEq Per OG tube PRN Radha Yan, PA-WHIT        potassium bicarbonate disintegrating tablet 60 mEq  60 mEq Per OG tube PRN Radha Yan, PA-WHIT        potassium, sodium phosphates 280-160-250 mg packet 2 packet  2 packet Per OG tube PRN Radha Yan, PA-C        potassium, sodium phosphates 280-160-250 mg packet 2 packet  2 packet Per OG tube PRN Radha Yan, PA-C   2 packet at 10/23/24 1220    potassium, sodium phosphates 280-160-250 mg packet 2 packet  2 packet Per OG tube PRN Radha Yan PA-C        propofol (DIPRIVAN) 10 mg/mL infusion (NON-TITRATING)  50 mcg/kg/min Intravenous Continuous Dolores Moore,  25.1 mL/hr at 10/23/24 1402 50 mcg/kg/min at 10/23/24 1402    silodosin capsule 4 mg  4 mg Per OG tube Daily Radha Yan, TIRSO   4 mg at 10/23/24 0811     Continuous Infusions:   insulin regular 1 units/mL infusion orderable (DKA)  0-52 Units/hr Intravenous Continuous 3.5 mL/hr at 10/23/24 0902 3.5 Units/hr at 10/23/24 0902    NORepinephrine bitartrate-D5W  0-1 mcg/kg/min Intravenous Continuous        propofol  50 mcg/kg/min Intravenous Continuous 25.1 mL/hr at 10/23/24 1402 50 mcg/kg/min at 10/23/24 1402       Review of Systems   Unable to perform ROS: Intubated     Objective:     Vital Signs (Most Recent):  Temp: 97.7 °F (36.5 °C) (10/23/24 1102)  Pulse: 73 (10/23/24 1502)  Resp: 20 (10/23/24 1502)  BP: (!) 118/59 (10/23/24 1502)  SpO2: 99 % (10/23/24 1502) Vital Signs (24h Range):  Temp:  [97.1 °F (36.2 °C)-97.7 °F (36.5 °C)] 97.7 °F (36.5 °C)  Pulse:  [58-92] 73  Resp:  [19-25] 20  SpO2:  [97 %-100 %] 99 %  BP: ()/(50-76) 118/59  Arterial Line BP: (105-154)/(46-69) 126/55     Weight: 83.5 kg (184 lb)  Body mass index is 27.98 kg/m².     Physical Exam  Vitals and nursing note reviewed.    Constitutional:       Appearance: He is ill-appearing. He is not diaphoretic.   HENT:      Head:      Comments: EEG in place  R periorbital ecchymosis  Eyes:      Pupils: Pupils are equal, round, and reactive to light.   Pulmonary:      Effort: Pulmonary effort is normal. No respiratory distress.   Musculoskeletal:      Cervical back: Neck supple.   Skin:     General: Skin is warm and dry.   Psychiatric:      Comments: Unable to assess            NEUROLOGICAL EXAMINATION:     CRANIAL NERVES     CN III, IV, VI   Pupils are equal, round, and reactive to light.       CN:   AQUILINO OU   Corneal intact OU   No spontaneous eye opening   Withdraws to noxious stimuli BLE    Exam findings to suggest seizures:  Myoclonus - no   eye twitching - no   Nystagmus - no   gaze deviation - no   waxy rigidity - no         Significant Labs: All pertinent lab results from the past 24 hours have been reviewed.    Significant Studies: I have reviewed all pertinent imaging results/findings within the past 24 hours.

## 2024-10-23 NOTE — PROGRESS NOTES
[facility] - Neuro Critical Care  Neurology-Epilepsy  Progress Note    Patient Name: Percy Ramirez  MRN: [id number]  Admission Date: 10/17/2024  Hospital Length of Stay: 6 days  Code Status: Full Code   Attending Provider: Nam Reynoso MD  Primary Care Physician: Kasie Edmondson MD   Principal Problem:Traumatic right-sided intracerebral hemorrhage without loss of consciousness    Subjective:     Hospital Course:   10/21>10/22 EEG: Recurrent right hemispheric seizures with significant improvement noted in frequency after Propofol initiated 10/21 pm.  10/22>10/23 EEG: Highly epileptogenic right hemispheric structural lesion and severe diffuse encephalopathy, 11 subclinical seizures arising from the right parietal/temporal region     Interval History: EEG 10/22>10/23 with 11 subclinical seizures arising from the right parietal/temporal region. Propofol and Lacosamide increased 10/23 am, EEG so far with no further seizures since increases.    Current Facility-Administered Medications   Medication Dose Route Frequency Provider Last Rate Last Admin    acetaminophen tablet 650 mg  650 mg Per OG tube Q6H PRN Saman Gonzalez MD   650 mg at 10/21/24 2316    atorvastatin tablet 40 mg  40 mg Per OG tube Daily Christina Em NP   40 mg at 10/23/24 0811    ceFAZolin SolR 2 g  2 g Intravenous Q8H Nam Reynoso MD   2 g at 10/23/24 1000    dextrose 10% bolus 125 mL 125 mL  12.5 g Intravenous PRN Nam Reynoso MD        dextrose 10% bolus 250 mL 250 mL  25 g Intravenous PRN Nam Reynoso MD        famotidine tablet 20 mg  20 mg Per OG tube Daily Saman Gonzalez MD   20 mg at 10/23/24 0811    heparin (porcine) injection 5,000 Units  5,000 Units Subcutaneous Q8H Khris Cote MD   5,000 Units at 10/23/24 0505    hydrALAZINE injection 10 mg  10 mg Intravenous Q4H PRN Radha Yan PA-C        insulin regular in 0.9 % NaCl 100 unit/100 mL (1 unit/mL) infusion  0-52 Units/hr Intravenous Continuous  Nam Reynoso MD 3.5 mL/hr at 10/23/24 0902 3.5 Units/hr at 10/23/24 0902    labetalol 20 mg/4 mL (5 mg/mL) IV syring  10 mg Intravenous Q4H PRN Radha Yan PA-C        lacosamide injection 200 mg  200 mg Intravenous Q12H Dolores Moore DO        levETIRAcetam injection 1,000 mg  1,000 mg Intravenous Q12H Nam Reynoso MD   1,000 mg at 10/23/24 0811    magnesium oxide split tablet 800 mg  800 mg Per OG tube PRN Radha Yan PA-C        magnesium oxide split tablet 800 mg  800 mg Per OG tube PRN Radha Yan PA-C        NORepinephrine 4 mg in sodium chloride 0.9% 250 mL infusion (premix)  0-1 mcg/kg/min Intravenous Continuous Dolores Moore DO        ondansetron injection 4 mg  4 mg Intravenous Q8H PRN Angel Nunez PA-C        potassium bicarbonate disintegrating tablet 35 mEq  35 mEq Per OG tube PRN Radha Yan PA-C        potassium bicarbonate disintegrating tablet 50 mEq  50 mEq Per OG tube PRN Radha Yan PA-C        potassium bicarbonate disintegrating tablet 60 mEq  60 mEq Per OG tube PRN Radha Yan PA-C        potassium, sodium phosphates 280-160-250 mg packet 2 packet  2 packet Per OG tube PRN Radha Yan PA-WHIT        potassium, sodium phosphates 280-160-250 mg packet 2 packet  2 packet Per OG tube PRN Radha Yan PA-WHIT   2 packet at 10/23/24 1220    potassium, sodium phosphates 280-160-250 mg packet 2 packet  2 packet Per OG tube PRN Radha Yan PA-C        propofol (DIPRIVAN) 10 mg/mL infusion (NON-TITRATING)  50 mcg/kg/min Intravenous Continuous Dolores Moore DO 25.1 mL/hr at 10/23/24 1402 50 mcg/kg/min at 10/23/24 1402    silodosin capsule 4 mg  4 mg Per OG tube Daily Radha Yan PA-C   4 mg at 10/23/24 0811     Continuous Infusions:   insulin regular 1 units/mL infusion orderable (DKA)  0-52 Units/hr Intravenous Continuous 3.5 mL/hr at 10/23/24 0902 3.5 Units/hr at 10/23/24 0902    NORepinephrine bitartrate-D5W  0-1 mcg/kg/min  Intravenous Continuous        propofol  50 mcg/kg/min Intravenous Continuous 25.1 mL/hr at 10/23/24 1402 50 mcg/kg/min at 10/23/24 1402       Review of Systems   Unable to perform ROS: Intubated     Objective:     Vital Signs (Most Recent):  Temp: 97.7 °F (36.5 °C) (10/23/24 1102)  Pulse: 73 (10/23/24 1502)  Resp: 20 (10/23/24 1502)  BP: (!) 118/59 (10/23/24 1502)  SpO2: 99 % (10/23/24 1502) Vital Signs (24h Range):  Temp:  [97.1 °F (36.2 °C)-97.7 °F (36.5 °C)] 97.7 °F (36.5 °C)  Pulse:  [58-92] 73  Resp:  [19-25] 20  SpO2:  [97 %-100 %] 99 %  BP: ()/(50-76) 118/59  Arterial Line BP: (105-154)/(46-69) 126/55     Weight: 83.5 kg (184 lb)  Body mass index is 27.98 kg/m².     Physical Exam  Vitals and nursing note reviewed.   Constitutional:       Appearance: He is ill-appearing. He is not diaphoretic.   HENT:      Head:      Comments: EEG in place  R periorbital ecchymosis  Eyes:      Pupils: Pupils are equal, round, and reactive to light.   Pulmonary:      Effort: Pulmonary effort is normal. No respiratory distress.   Musculoskeletal:      Cervical back: Neck supple.   Skin:     General: Skin is warm and dry.   Psychiatric:      Comments: Unable to assess            NEUROLOGICAL EXAMINATION:     CRANIAL NERVES     CN III, IV, VI   Pupils are equal, round, and reactive to light.       CN:   AQUILINO OU   Corneal intact OU   No spontaneous eye opening   Withdraws to noxious stimuli BLE    Exam findings to suggest seizures:  Myoclonus - no   eye twitching - no   Nystagmus - no   gaze deviation - no   waxy rigidity - no         Significant Labs: All pertinent lab results from the past 24 hours have been reviewed.    Significant Studies: I have reviewed all pertinent imaging results/findings within the past 24 hours.  Assessment and Plan:     * Traumatic right-sided intracerebral hemorrhage without loss of consciousness  84 year old man with significant past medical history of HTN, HLD, DM2, CAD s/p CABG x2, Afib on  Coumadin, ILD on home O2, HFrEF admitted to [facility] [place] as transfer from OSH For evaluation and management of large traumatic R frontal IPH now s/p R supraorbital craniotomy for hematoma evacuation 10/18 and R craniotomy for evacuation of post-op subdural hematoma 10/19. EEG initiated 10/21 showing right hemispheric seizures.    - Management per NCC, NSGY, planning for MRI brain for further evaluation  - Drains in place    Seizures  In setting of R IPH s/p R supraorbital craniotomy for hematoma evacuation 10/18 and R craniotomy for evacuation of post-op subdural hematoma 10/19. EEG initiated 10/21 showing right hemispheric seizures.      Recommendations:  - Continuous vEEG monitoring - 11 seizures with right hemispheric onset 10/22>10/23   - Recommend to increase Lacosamide to 200 mg BID  - Propofol increased to 50 mKm, continue at this rate overnight  - Recommend to continue Levetiracetam 1000 mg BID  - Avoid agents that lower seizure threshold  - Management of infectious/metabolic abnormalities per NCC  - Seizure precautions    Discussed plan of care with NCC team. Will follow, please call with questions.  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  - Glucose >600 on ER presentation  - Management per NCC, currently on insulin gtt    Benign hypertension with chronic kidney disease, stage III  - Vitals reviewed, management per NCC  - Carvedilol 12.5 mg BID discontinued 10/23    Major depressive  disorder, recurrent episode, mild  - SSRI currently on hold, management per NCC        VTE Risk Mitigation (From admission, onward)           Ordered     heparin (porcine) injection 5,000 Units  Every 8 hours         10/22/24 1121     IP VTE HIGH RISK PATIENT  Once         10/17/24 2326     Place sequential compression device  Until discontinued         10/17/24 2326     Reason for No Pharmacological VTE Prophylaxis  Once        Question:  Reasons:  Answer:  Active Bleeding    10/17/24 2326                    Radha Alfonso PA-C  Neurology-Epilepsy  [facility] - Neuro Critical Care  Staff: Dr. Trammell

## 2024-10-23 NOTE — SUBJECTIVE & OBJECTIVE
Interval History: 10/23: on EEG with R hemispheric seizures, added vimpat to keppra, on prop at 40.     Medications:  Continuous Infusions:   insulin regular 1 units/mL infusion orderable (DKA)  0-52 Units/hr Intravenous Continuous 2.4 mL/hr at 10/23/24 0611 2.4 Units/hr at 10/23/24 0611    propofol  40 mcg/kg/min Intravenous Continuous 20 mL/hr at 10/23/24 0611 40 mcg/kg/min at 10/23/24 0611     Scheduled Meds:   atorvastatin  40 mg Per OG tube Daily    carvediloL  12.5 mg Per OG tube BID WM    ceFAZolin (Ancef) IV (PEDS and ADULTS)  2 g Intravenous Q8H    famotidine  20 mg Per OG tube Daily    heparin (porcine)  5,000 Units Subcutaneous Q8H    lacosamide (Vimpat) IV orderable  100 mg Intravenous Q12H    levETIRAcetam (Keppra) IV (PEDS and ADULTS)  1,000 mg Intravenous Q12H    polyethylene glycol  17 g Per OG tube Daily    senna-docusate 8.6-50 mg  1 tablet Per OG tube Daily    silodosin  4 mg Per OG tube Daily     PRN Meds:  Current Facility-Administered Medications:     acetaminophen, 650 mg, Per OG tube, Q6H PRN    dextrose 10%, 12.5 g, Intravenous, PRN    dextrose 10%, 25 g, Intravenous, PRN    hydrALAZINE, 10 mg, Intravenous, Q4H PRN    labetalol, 10 mg, Intravenous, Q4H PRN    magnesium oxide, 800 mg, Per OG tube, PRN    magnesium oxide, 800 mg, Per OG tube, PRN    ondansetron, 4 mg, Intravenous, Q8H PRN    potassium bicarbonate, 35 mEq, Per OG tube, PRN    potassium bicarbonate, 50 mEq, Per OG tube, PRN    potassium bicarbonate, 60 mEq, Per OG tube, PRN    potassium, sodium phosphates, 2 packet, Per OG tube, PRN    potassium, sodium phosphates, 2 packet, Per OG tube, PRN    potassium, sodium phosphates, 2 packet, Per OG tube, PRN     Review of Systems  Objective:     Weight: 83.5 kg (184 lb)  Body mass index is 27.98 kg/m².  Vital Signs (Most Recent):  Temp: 97.1 °F (36.2 °C) (10/23/24 0302)  Pulse: 82 (10/23/24 0736)  Resp: 20 (10/23/24 0736)  BP: 132/66 (10/23/24 0736)  SpO2: 99 % (10/23/24 0736) Vital  Signs (24h Range):  Temp:  [97.1 °F (36.2 °C)-98 °F (36.7 °C)] 97.1 °F (36.2 °C)  Pulse:  [74-93] 82  Resp:  [16-22] 20  SpO2:  [95 %-100 %] 99 %  BP: (114-153)/(56-76) 132/66  Arterial Line BP: (137-154)/(49-69) 153/69                Vent Mode: A/C  Oxygen Concentration (%):  [40] 40  Resp Rate Total:  [16 br/min-23 br/min] 22 br/min  Vt Set:  [500 mL] 500 mL  PEEP/CPAP:  [5 cmH20] 5 cmH20  Mean Airway Pressure:  [8.6 hxW01-62 cmH20] 12 cmH20             Closed/Suction Drain 10/19/24 Tube - 1 Right Scalp Accordion 10 Fr. (Active)   Site Description Unable to view 10/23/24 0302   Dressing Type Gauze 10/23/24 0302   Dressing Status Clean;Dry;Intact 10/23/24 0302   Dressing Intervention Integrity maintained 10/23/24 0302   Drainage Bloody 10/23/24 0302   Status To bulb suction 10/23/24 0302   Output (mL) 20 mL 10/23/24 0602            Closed/Suction Drain 10/19/24 Tube - 2 Right Scalp Bulb 10 Fr. (Active)   Site Description Unable to view 10/23/24 0302   Dressing Type Gauze 10/23/24 0302   Dressing Status Clean;Dry;Intact 10/23/24 0302   Dressing Intervention Integrity maintained 10/23/24 0302   Drainage None 10/23/24 0302   Status To bulb suction 10/23/24 0302   Output (mL) 14 mL 10/23/24 0602            NG/OG Tube 10/19/24 1200 Right mouth (Active)   Placement Check placement verified by x-ray 10/23/24 0302   Tolerance no signs/symptoms of discomfort 10/22/24 1502   Securement secured to commercial device 10/23/24 0302   Clamp Status/Tolerance unclamped 10/23/24 0302   Suction Setting/Drainage Method suction at the bedside 10/23/24 0302   Insertion Site Appearance no redness, warmth, tenderness, skin breakdown, drainage 10/23/24 0302   Drainage None 10/23/24 0302   Flush/Irrigation flushed w/;water;no resistance met 10/23/24 0302   Feeding Type continuous 10/23/24 0302   Feeding Action feeding continued 10/23/24 0302   Current Rate (mL/hr) 65 mL/hr 10/23/24 0302   Goal Rate (mL/hr) 65 mL/hr 10/23/24 0302   Intake  "(mL) 40 mL 10/23/24 0402   Water Bolus (mL) 200 mL 10/23/24 0602   Formula Name diabetic isosource 10/23/24 0302   Intake (mL) - Formula Tube Feeding 65 10/23/24 0602   Residual Amount (ml) 15 ml 10/22/24 0702            Rectal Tube 10/22/24 1718 rectal tube w/ balloon (indicate number of mLs) (Active)   Stool Color Brown 10/23/24 0302   Insertion Site Appearance no redness, warmth, tenderness, skin breakdown, drainage 10/23/24 0302   Flush/Irrigation flushed w/;water;no resistance met 10/23/24 0302   Intake (mL) 40 mL 10/23/24 0202   Rectal Tube Output 300 mL 10/23/24 0602       Male External Urinary Catheter 10/20/24 1934 (Active)   Collection Container Urimeter 10/23/24 0302   Securement Method secured to top of thigh w/ adhesive device 10/23/24 0302   Skin no redness;no breakdown 10/23/24 0302   Tolerance no signs/symptoms of discomfort 10/23/24 0302   Output (mL) 0 mL 10/23/24 0602   Catheter Change Date 10/22/24 10/23/24 0302   Catheter Change Time 2202 10/23/24 0302          Physical Exam         Neurosurgery Physical Exam    [id number]  On EEG  Prop paused  PERRL  +c/g/c  LOC RUE  WD RLE>LLE  No movement in LUE    Significant Labs:  Recent Labs   Lab 10/22/24  0130 10/22/24  0512 10/22/24  1912 10/23/24  0011   *  --   --  157*   * 156* 152* 156*   K 4.0  --   --  3.8   *  --   --  126*   CO2 22*  --   --  23   BUN 54*  --   --  47*   CREATININE 1.9*  --   --  1.4   CALCIUM 7.6*  --   --  8.0*   MG 2.7*  --   --  3.0*     Recent Labs   Lab 10/22/24  0130 10/22/24  1008 10/23/24  0011   WBC 9.05 9.26 8.37   HGB 6.7* 8.1* 8.3*   HCT 22.6* 26.5* 27.7*    137* 139*     No results for input(s): "LABPT", "INR", "APTT" in the last 48 hours.  Microbiology Results (last 7 days)       Procedure Component Value Units Date/Time    Blood culture [[id number]] Collected: 10/18/24 0335    Order Status: Completed Specimen: Blood from Peripheral, Foot, Right Updated: 10/23/24 0612     Blood Culture, " Routine No growth after 5 days.    Blood culture [[id number]] Collected: 10/18/24 0345    Order Status: Completed Specimen: Blood from Peripheral, Hand, Left Updated: 10/23/24 0612     Blood Culture, Routine No growth after 5 days.    Culture, Respiratory with Gram Stain [[id number]]     Order Status: No result Specimen: Respiratory           All pertinent labs from the last 24 hours have been reviewed.    Significant Diagnostics:  CT: No results found in the last 24 hours.  MRI: No results found in the last 24 hours.

## 2024-10-23 NOTE — PLAN OF CARE
[facility] Care Plan    POC reviewed with Percy Ramirez at 0300. Patient unable to verbalize understanding. Questions and concerns addressed. No acute events today. Pt progressing toward goals. Will continue to monitor. See below and flowsheets for full assessment and VS info.     - SBP < 140 maintained, no prn needed  - cEEG continued overnight  - prop continued at 40, non-titrating  - insulin drip continued and titrated according to nomogram  - bladder scan and straight cath x2  - feeds continued at goal of 65  - 200mL free water flush q6  - silodosin 4mg added for urinary retention  - elevated triglyceride level noted; ARLIN Yan notified; no further orders, will discuss in am  - ordered to hold MRI until pt can be taken off eeg    Is this a stroke patient? yes- Stroke booklet reviewed with patient, risk factors identified for patient and stroke booklet remains at bedside for ongoing education.     Care individualization: stimulation decreased, blankets applied, position adjusted    Neuro:  Federico Coma Scale  Best Eye Response: 1-->(E1) none  Best Motor Response: 4-->(M4) withdraws from pain  Best Verbal Response: 1-->(V1) none  [place] Coma Scale Score: 6  Assessment Qualifiers: patient chemically sedated or paralyzed, patient intubated  Pupil PERRLA: yes     24 hr Temp:  [97.1 °F (36.2 °C)-98 °F (36.7 °C)]     CV:   Rhythm: normal sinus rhythm  BP goals:   SBP < 140  MAP > 65    Resp:      Vent Mode: A/C  Set Rate: 16 BPM  Oxygen Concentration (%): 40  Vt Set: 500 mL  PEEP/CPAP: 5 cmH20  Pressure Support: 5 cmH20    Plan: wean to extubate    GI/:     Diet/Nutrition Received: tube feeding  Last Bowel Movement: 10/23/24  Voiding Characteristics: external catheter    Intake/Output Summary (Last 24 hours) at 10/23/2024 0440  Last data filed at 10/23/2024 0402  Gross per 24 hour   Intake 3518.02 ml   Output 1420 ml   Net 2098.02 ml     Unmeasured Output  Urine Occurrence: 1  Stool Occurrence: 1  Pad Count:  2    Labs/Accuchecks:  Recent Labs   Lab 10/23/24  0011   WBC 8.37   RBC 3.04*   HGB 8.3*   HCT 27.7*   *      Recent Labs   Lab 10/23/24  0011   *   K 3.8   CO2 23   *   BUN 47*   CREATININE 1.4   ALKPHOS 77   ALT 8*   AST 14   BILITOT 0.3      Recent Labs   Lab 10/19/24  0158   INR 1.1   APTT 25.0      Recent Labs   Lab 10/17/24  1928   TROPONINI 0.022       Electrolytes: Electrolytes replaced  Accuchecks: Q1H    Gtts:   insulin regular 1 units/mL infusion orderable (DKA)  0-52 Units/hr Intravenous Continuous 2.4 mL/hr at 10/23/24 0415 2.4 Units/hr at 10/23/24 0415    propofol  40 mcg/kg/min Intravenous Continuous 20 mL/hr at 10/23/24 0402 40 mcg/kg/min at 10/23/24 0402       LDA/Wounds:    Mykel Risk Assessment  Sensory Perception: 1-->completely limited  Moisture: 3-->occasionally moist  Activity: 1-->bedfast  Mobility: 2-->very limited  Nutrition: 3-->adequate  Friction and Shear: 2-->potential problem  Mykel Score: 12  Is your mykel score 12 or less? yes heel boots on    Nurses Note -- 4 Eyes    Is there altered skin present?  No  Second RN/Staff Member:  RADHA Mercer      Restraints:   Restraint Order  Length of Order: Order good for next 24 hours or when removed.  Date that the current order will : 10/23/24  Time that the current order will : 1518  Order Upon Application: Yes    [facility]

## 2024-10-23 NOTE — SUBJECTIVE & OBJECTIVE
Interval History:      Patient noted to have  additional seizures, Vimpat and propofol dosing increased with good response. Enteral free water volume increased to help with hypernatremia.    Objective:     Vitals:  Temp: 97.3 °F (36.3 °C)  Pulse: 69  Rhythm: atrial rhythm  BP: 107/60  MAP (mmHg): 78  CVP (mean): 15 mmHg  CI (L/min/m2): 2.2 L/min/m2  SVI: 31  SVV: 14 %  Resp: 19  SpO2: 100 %  Oxygen Concentration (%): 40  Vent Mode: A/C  Set Rate: 16 BPM  Vt Set: 500 mL  PEEP/CPAP: 5 cmH20  Peak Airway Pressure: 19 cmH20  Mean Airway Pressure: 9.6 cmH20  Plateau Pressure: 13 cmH20    Temp  Min: 97.1 °F (36.2 °C)  Max: 98 °F (36.7 °C)  Pulse  Min: 58  Max: 92  BP  Min: 92/50  Max: 153/76  MAP (mmHg)  Min: 67  Max: 105  CVP (mean)  Min: 4 mmHg  Max: 15 mmHg  CI (L/min/m2)  Min: 2.2 L/min/m2  Max: 2.8 L/min/m2  SVI  Min: 30  Max: 36  SVV  Min: 13 %  Max: 19 %  Resp  Min: 19  Max: 25  SpO2  Min: 97 %  Max: 100 %  Oxygen Concentration (%)  Min: 40  Max: 40    10/22 0701 - 10/23 0700  In: 3755.4 [I.V.:585.9]  Out: 2144 [Urine:1750; Drains:94]   Unmeasured Output  Urine Occurrence: 1  Stool Occurrence: 1  Pad Count: 2        Physical Exam  Constitutional:       General: He is not in acute distress.     Comments: sedated   Cardiovascular:      Rate and Rhythm: Normal rate.      Comments: (Monitor reading afib?)  Pulmonary:      Effort: No respiratory distress.      Comments: intubated  Abdominal:      General: Abdomen is flat. There is no distension.   Neurological:      Comments:  Exam limited due to sedation            Medications:  Continuousinsulin regular 1 units/mL infusion orderable (DKA), Last Rate: 2.3 Units/hr (10/23/24 1902)  NORepinephrine bitartrate-D5W  propofol, Last Rate: 50 mcg/kg/min (10/23/24 1902)    Scheduledatorvastatin, 40 mg, Daily  ceFAZolin (Ancef) IV (PEDS and ADULTS), 2 g, Q8H  famotidine, 20 mg, Daily  heparin (porcine), 5,000 Units, Q8H  lacosamide (Vimpat) IV orderable, 200 mg,  Q12H  levETIRAcetam (Keppra) IV (PEDS and ADULTS), 1,000 mg, Q12H  silodosin, 4 mg, Daily    PRNacetaminophen, 650 mg, Q6H PRN  dextrose 10%, 12.5 g, PRN  dextrose 10%, 25 g, PRN  hydrALAZINE, 10 mg, Q4H PRN  labetalol, 10 mg, Q4H PRN  magnesium oxide, 800 mg, PRN  magnesium oxide, 800 mg, PRN  ondansetron, 4 mg, Q8H PRN  potassium bicarbonate, 35 mEq, PRN  potassium bicarbonate, 50 mEq, PRN  potassium bicarbonate, 60 mEq, PRN  potassium, sodium phosphates, 2 packet, PRN  potassium, sodium phosphates, 2 packet, PRN  potassium, sodium phosphates, 2 packet, PRN      Today I personally reviewed pertinent medications, lines/drains/airways, laboratory results,   Diet  Diet NPO Except for: Medication  Diet NPO Except for: Medication

## 2024-10-23 NOTE — PROCEDURES
ICU EEG/VIDEO MONITORING REPORT    DATE OF SERVICE: 10/22/24-10/23/24  EEG NUMBER: FH -1  [facility]    METHODOLOGY   Electroencephalographic (EEG) recording is with electrodes placed according to the International 10-20 placement system.  Thirty two (32) channels of digital signal are simultaneously recorded from the scalp and may include EKG, EMG, and/or eye monitors.   Recording band pass was 0.1 to 512 hz.  Digital video recording of the patient is simultaneously recorded with the EEG.  The nursing staff report clinical symptoms and may press an event button when the patient has symptoms of clinical interest to the treating physicians.  EEG and video recording is stored and archived in digital format.  The entire recording is visually reviewed and the times identified by computer analysis as being spikes or seizures are reviewed again.  Activation procedures which include photic stimulation, hyperventilation and instructing patients to perform simple task are done in selected patients.   Compresses spectral analysis (CSA) is also performed on the activity recorded from each individual channel.  This is displayed as a power display of frequencies from 0 to 30 Hz over time.   The CSA analysis is done and displayed continuously.  This is reviewed for asymmetries in power between homologous areas of the scalp and for presence of changes in power which canbe seen when seizures occur.  Sections of suspected abnormalities on the CSA is then compared with the original EEG recording.     [employer] software was also utilized in the review of this study.  This software suite analyzes the EEG recording in multiple domains.  Coherence and rhythmicity is computed to identify EEG sections which may contain organized seizures.  Each channel undergoes analysis to detect presence of spike and sharp waves which have special and morphological characteristic of epileptic activity.  The routine EEG recording  is converted from spacial into frequency domain.  This is then displayed comparing homologous areas to identify areas of significant asymmetry.  Algorithm to identify non-cortically generated artifact is used to separate eye movement, EMG and other artifact from the EEG.      Recording Times  Start on 10/22/24 at 07:00  Stop on 10/23/24 at 07:00  24 hours recorded    This EEG study is performed in the ICU with the patient on the following sedative medications: propofol 30 -> 40     Indication: 83 y.o. 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     State of Consciousness:   Coma    Background:   The background is discontinuous  and asymmetric.  The left hemisphere displays a greater degree of suppression than the right and is composed predominantly of low to moderate amplitude theta/delta activity.  The right hemisphere alternates between periods of suppression and bursts of activity consisting of low amplitude irregular theta/delta activity with embedded epileptiform discharges.    Sleep:   No sleep architecture is appreciated during the record    Epileptiform Abnormalities  Frequent right parietal/temporal discharges, at times periodic, maximum P4/T6    Seizures/Events:   A total of 11 subclinical seizures are recorded arising from the right parietal/temporal region.  Electrographically seizures start with P4/T6 spike wave complexes that increase in frequency and propagates to the midline.      Impression:   Abnormal study consistent with a highly epileptogenic right hemispheric structural lesion and a severe diffuse encephalopathy.  A total of 11 subclinical seizures are recorded arising from the right parietal/temporal region.      Mayra Trammell MD  [facility]   Department of Neurology/Epilepsy

## 2024-10-24 ENCOUNTER — DOCUMENTATION ONLY (OUTPATIENT)
Dept: NEUROLOGY | Facility: CLINIC | Age: 84
End: 2024-10-24
Payer: MEDICARE

## 2024-10-24 PROBLEM — R41.82 ALTERED MENTAL STATUS: Status: ACTIVE | Noted: 2024-10-24

## 2024-10-24 LAB
ALBUMIN SERPL BCP-MCNC: 1.9 G/DL (ref 3.5–5.2)
ALLENS TEST: ABNORMAL
ALP SERPL-CCNC: 66 U/L (ref 40–150)
ALT SERPL W/O P-5'-P-CCNC: <5 U/L (ref 10–44)
ANION GAP SERPL CALC-SCNC: 7 MMOL/L (ref 8–16)
AST SERPL-CCNC: 18 U/L (ref 10–40)
BASOPHILS # BLD AUTO: 0.02 K/UL (ref 0–0.2)
BASOPHILS NFR BLD: 0.3 % (ref 0–1.9)
BILIRUB SERPL-MCNC: 0.2 MG/DL (ref 0.1–1)
BUN SERPL-MCNC: 43 MG/DL (ref 8–23)
CALCIUM SERPL-MCNC: 7.9 MG/DL (ref 8.7–10.5)
CHLORIDE SERPL-SCNC: 120 MMOL/L (ref 95–110)
CO2 SERPL-SCNC: 24 MMOL/L (ref 23–29)
CREAT SERPL-MCNC: 1.3 MG/DL (ref 0.5–1.4)
DELSYS: ABNORMAL
DIFFERENTIAL METHOD BLD: ABNORMAL
EOSINOPHIL # BLD AUTO: 0.3 K/UL (ref 0–0.5)
EOSINOPHIL NFR BLD: 5.4 % (ref 0–8)
ERYTHROCYTE [DISTWIDTH] IN BLOOD BY AUTOMATED COUNT: 18 % (ref 11.5–14.5)
ERYTHROCYTE [SEDIMENTATION RATE] IN BLOOD BY WESTERGREN METHOD: 16 MM/H
EST. GFR  (NO RACE VARIABLE): 54.8 ML/MIN/1.73 M^2
FIO2: 40
GLUCOSE SERPL-MCNC: 153 MG/DL (ref 70–110)
HCO3 UR-SCNC: 27.2 MMOL/L (ref 24–28)
HCT VFR BLD AUTO: 25.6 % (ref 40–54)
HGB BLD-MCNC: 7.7 G/DL (ref 14–18)
IMM GRANULOCYTES # BLD AUTO: 0.28 K/UL (ref 0–0.04)
IMM GRANULOCYTES NFR BLD AUTO: 4.5 % (ref 0–0.5)
LYMPHOCYTES # BLD AUTO: 0.4 K/UL (ref 1–4.8)
LYMPHOCYTES NFR BLD: 6.9 % (ref 18–48)
MAGNESIUM SERPL-MCNC: 3.2 MG/DL (ref 1.6–2.6)
MCH RBC QN AUTO: 27.9 PG (ref 27–31)
MCHC RBC AUTO-ENTMCNC: 30.1 G/DL (ref 32–36)
MCV RBC AUTO: 93 FL (ref 82–98)
MODE: ABNORMAL
MONOCYTES # BLD AUTO: 0.8 K/UL (ref 0.3–1)
MONOCYTES NFR BLD: 13.1 % (ref 4–15)
NEUTROPHILS # BLD AUTO: 4.4 K/UL (ref 1.8–7.7)
NEUTROPHILS NFR BLD: 69.8 % (ref 38–73)
NRBC BLD-RTO: 1 /100 WBC
OHS QRS DURATION: 112 MS
OHS QTC CALCULATION: 466 MS
PCO2 BLDA: 42.3 MMHG (ref 35–45)
PEEP: 5
PH SMN: 7.42 [PH] (ref 7.35–7.45)
PHOSPHATE SERPL-MCNC: 3.7 MG/DL (ref 2.7–4.5)
PLATELET # BLD AUTO: 125 K/UL (ref 150–450)
PMV BLD AUTO: 11.2 FL (ref 9.2–12.9)
PO2 BLDA: 95 MMHG (ref 80–100)
POC BE: 3 MMOL/L
POC SATURATED O2: 97 % (ref 95–100)
POC TCO2: 28 MMOL/L (ref 23–27)
POCT GLUCOSE: 113 MG/DL (ref 70–110)
POCT GLUCOSE: 140 MG/DL (ref 70–110)
POCT GLUCOSE: 145 MG/DL (ref 70–110)
POCT GLUCOSE: 147 MG/DL (ref 70–110)
POCT GLUCOSE: 154 MG/DL (ref 70–110)
POCT GLUCOSE: 159 MG/DL (ref 70–110)
POCT GLUCOSE: 159 MG/DL (ref 70–110)
POCT GLUCOSE: 161 MG/DL (ref 70–110)
POCT GLUCOSE: 164 MG/DL (ref 70–110)
POCT GLUCOSE: 168 MG/DL (ref 70–110)
POCT GLUCOSE: 175 MG/DL (ref 70–110)
POCT GLUCOSE: 180 MG/DL (ref 70–110)
POCT GLUCOSE: 190 MG/DL (ref 70–110)
POCT GLUCOSE: 195 MG/DL (ref 70–110)
POCT GLUCOSE: 205 MG/DL (ref 70–110)
POCT GLUCOSE: 210 MG/DL (ref 70–110)
POCT GLUCOSE: 211 MG/DL (ref 70–110)
POCT GLUCOSE: 213 MG/DL (ref 70–110)
POCT GLUCOSE: 218 MG/DL (ref 70–110)
POCT GLUCOSE: 220 MG/DL (ref 70–110)
POCT GLUCOSE: 230 MG/DL (ref 70–110)
POCT GLUCOSE: 236 MG/DL (ref 70–110)
POCT GLUCOSE: 236 MG/DL (ref 70–110)
POCT GLUCOSE: 267 MG/DL (ref 70–110)
POTASSIUM SERPL-SCNC: 4.3 MMOL/L (ref 3.5–5.1)
PROT SERPL-MCNC: 5.5 G/DL (ref 6–8.4)
RBC # BLD AUTO: 2.76 M/UL (ref 4.6–6.2)
SAMPLE: ABNORMAL
SITE: ABNORMAL
SODIUM SERPL-SCNC: 148 MMOL/L (ref 136–145)
SODIUM SERPL-SCNC: 151 MMOL/L (ref 136–145)
VT: 500
WBC # BLD AUTO: 6.26 K/UL (ref 3.9–12.7)

## 2024-10-24 PROCEDURE — 25000003 PHARM REV CODE 250: Performed by: PSYCHIATRY & NEUROLOGY

## 2024-10-24 PROCEDURE — 83735 ASSAY OF MAGNESIUM: CPT

## 2024-10-24 PROCEDURE — 94003 VENT MGMT INPAT SUBQ DAY: CPT

## 2024-10-24 PROCEDURE — 84295 ASSAY OF SERUM SODIUM: CPT

## 2024-10-24 PROCEDURE — 51701 INSERT BLADDER CATHETER: CPT

## 2024-10-24 PROCEDURE — 84100 ASSAY OF PHOSPHORUS: CPT

## 2024-10-24 PROCEDURE — 63600175 PHARM REV CODE 636 W HCPCS: Mod: JZ,JG

## 2024-10-24 PROCEDURE — 27200966 HC CLOSED SUCTION SYSTEM

## 2024-10-24 PROCEDURE — 99291 CRITICAL CARE FIRST HOUR: CPT | Mod: ,,, | Performed by: PSYCHIATRY & NEUROLOGY

## 2024-10-24 PROCEDURE — 25000003 PHARM REV CODE 250

## 2024-10-24 PROCEDURE — 63600175 PHARM REV CODE 636 W HCPCS

## 2024-10-24 PROCEDURE — 99900026 HC AIRWAY MAINTENANCE (STAT)

## 2024-10-24 PROCEDURE — 95714 VEEG EA 12-26 HR UNMNTR: CPT

## 2024-10-24 PROCEDURE — 80053 COMPREHEN METABOLIC PANEL: CPT

## 2024-10-24 PROCEDURE — 51798 US URINE CAPACITY MEASURE: CPT

## 2024-10-24 PROCEDURE — 27100171 HC OXYGEN HIGH FLOW UP TO 24 HOURS

## 2024-10-24 PROCEDURE — 93005 ELECTROCARDIOGRAM TRACING: CPT

## 2024-10-24 PROCEDURE — 99900035 HC TECH TIME PER 15 MIN (STAT)

## 2024-10-24 PROCEDURE — 94761 N-INVAS EAR/PLS OXIMETRY MLT: CPT

## 2024-10-24 PROCEDURE — 95720 EEG PHY/QHP EA INCR W/VEEG: CPT | Mod: ,,, | Performed by: STUDENT IN AN ORGANIZED HEALTH CARE EDUCATION/TRAINING PROGRAM

## 2024-10-24 PROCEDURE — 99233 SBSQ HOSP IP/OBS HIGH 50: CPT | Mod: ,,, | Performed by: PHYSICIAN ASSISTANT

## 2024-10-24 PROCEDURE — 85025 COMPLETE CBC W/AUTO DIFF WBC: CPT

## 2024-10-24 PROCEDURE — C9254 INJECTION, LACOSAMIDE: HCPCS

## 2024-10-24 PROCEDURE — 37799 UNLISTED PX VASCULAR SURGERY: CPT

## 2024-10-24 PROCEDURE — 93010 ELECTROCARDIOGRAM REPORT: CPT | Mod: ,,, | Performed by: INTERNAL MEDICINE

## 2024-10-24 PROCEDURE — 20000000 HC ICU ROOM

## 2024-10-24 PROCEDURE — 25000003 PHARM REV CODE 250: Performed by: STUDENT IN AN ORGANIZED HEALTH CARE EDUCATION/TRAINING PROGRAM

## 2024-10-24 PROCEDURE — 63600175 PHARM REV CODE 636 W HCPCS: Performed by: PSYCHIATRY & NEUROLOGY

## 2024-10-24 PROCEDURE — 82803 BLOOD GASES ANY COMBINATION: CPT

## 2024-10-24 RX ADMIN — HEPARIN SODIUM 5000 UNITS: 5000 INJECTION INTRAVENOUS; SUBCUTANEOUS at 02:10

## 2024-10-24 RX ADMIN — FAMOTIDINE 20 MG: 20 TABLET ORAL at 08:10

## 2024-10-24 RX ADMIN — PROPOFOL 50 MCG/KG/MIN: 10 INJECTION, EMULSION INTRAVENOUS at 08:10

## 2024-10-24 RX ADMIN — HEPARIN SODIUM 5000 UNITS: 5000 INJECTION INTRAVENOUS; SUBCUTANEOUS at 05:10

## 2024-10-24 RX ADMIN — HEPARIN SODIUM 5000 UNITS: 5000 INJECTION INTRAVENOUS; SUBCUTANEOUS at 09:10

## 2024-10-24 RX ADMIN — PROPOFOL 50 MCG/KG/MIN: 10 INJECTION, EMULSION INTRAVENOUS at 12:10

## 2024-10-24 RX ADMIN — CEFAZOLIN 2 G: 2 INJECTION, POWDER, FOR SOLUTION INTRAMUSCULAR; INTRAVENOUS at 06:10

## 2024-10-24 RX ADMIN — INSULIN HUMAN 3.5 UNITS/HR: 1 INJECTION, SOLUTION INTRAVENOUS at 09:10

## 2024-10-24 RX ADMIN — PROPOFOL 50 MCG/KG/MIN: 10 INJECTION, EMULSION INTRAVENOUS at 04:10

## 2024-10-24 RX ADMIN — LACOSAMIDE 200 MG: 10 INJECTION INTRAVENOUS at 09:10

## 2024-10-24 RX ADMIN — CEFAZOLIN 2 G: 2 INJECTION, POWDER, FOR SOLUTION INTRAMUSCULAR; INTRAVENOUS at 01:10

## 2024-10-24 RX ADMIN — ATORVASTATIN CALCIUM 40 MG: 40 TABLET, FILM COATED ORAL at 08:10

## 2024-10-24 RX ADMIN — SILODOSIN 4 MG: 4 CAPSULE ORAL at 08:10

## 2024-10-24 RX ADMIN — LABETALOL HYDROCHLORIDE 10 MG: 5 INJECTION, SOLUTION INTRAVENOUS at 01:10

## 2024-10-24 RX ADMIN — CEFAZOLIN 2 G: 2 INJECTION, POWDER, FOR SOLUTION INTRAMUSCULAR; INTRAVENOUS at 10:10

## 2024-10-24 RX ADMIN — LEVETIRACETAM 1000 MG: 100 INJECTION INTRAVENOUS at 07:10

## 2024-10-24 RX ADMIN — LACOSAMIDE 200 MG: 10 INJECTION INTRAVENOUS at 10:10

## 2024-10-24 RX ADMIN — LABETALOL HYDROCHLORIDE 10 MG: 5 INJECTION, SOLUTION INTRAVENOUS at 07:10

## 2024-10-24 RX ADMIN — LEVETIRACETAM 1000 MG: 100 INJECTION INTRAVENOUS at 08:10

## 2024-10-24 NOTE — SUBJECTIVE & OBJECTIVE
Interval History: 10/24/2024: Still intubated on prop for exam. Per nursing staff, patient still withdrawing. Having R parietal/temporal seizures through yesterday, further EEG studies pending. Drains put out 30 cc and 10cc, will remove once EEG is off.    Medications:  Continuous Infusions:   insulin regular 1 units/mL infusion orderable (DKA)  0-52 Units/hr Intravenous Continuous 0.9 mL/hr at 10/24/24 0802 0.9 Units/hr at 10/24/24 0802    NORepinephrine bitartrate-D5W  0-1 mcg/kg/min Intravenous Continuous        propofol  50 mcg/kg/min Intravenous Continuous 25.1 mL/hr at 10/24/24 0832 50 mcg/kg/min at 10/24/24 0832     Scheduled Meds:   atorvastatin  40 mg Per OG tube Daily    ceFAZolin (Ancef) IV (PEDS and ADULTS)  2 g Intravenous Q8H    famotidine  20 mg Per OG tube Daily    heparin (porcine)  5,000 Units Subcutaneous Q8H    lacosamide (Vimpat) IV orderable  200 mg Intravenous Q12H    levETIRAcetam (Keppra) IV (PEDS and ADULTS)  1,000 mg Intravenous Q12H    silodosin  4 mg Per OG tube Daily     PRN Meds:  Current Facility-Administered Medications:     acetaminophen, 650 mg, Per OG tube, Q6H PRN    dextrose 10%, 12.5 g, Intravenous, PRN    dextrose 10%, 25 g, Intravenous, PRN    hydrALAZINE, 10 mg, Intravenous, Q4H PRN    labetalol, 10 mg, Intravenous, Q4H PRN    magnesium oxide, 800 mg, Per OG tube, PRN    magnesium oxide, 800 mg, Per OG tube, PRN    ondansetron, 4 mg, Intravenous, Q8H PRN    potassium bicarbonate, 35 mEq, Per OG tube, PRN    potassium bicarbonate, 50 mEq, Per OG tube, PRN    potassium bicarbonate, 60 mEq, Per OG tube, PRN    potassium, sodium phosphates, 2 packet, Per OG tube, PRN    potassium, sodium phosphates, 2 packet, Per OG tube, PRN    potassium, sodium phosphates, 2 packet, Per OG tube, PRN     Review of Systems  Objective:     Weight: 83.5 kg (184 lb)  Body mass index is 27.98 kg/m².  Vital Signs (Most Recent):  Temp: 97.4 °F (36.3 °C) (10/24/24 0702)  Pulse: 73 (10/24/24  0802)  Resp: 16 (10/24/24 0802)  BP: (!) 117/58 (10/24/24 0802)  SpO2: 100 % (10/24/24 0802) Vital Signs (24h Range):  Temp:  [96.8 °F (36 °C)-98 °F (36.7 °C)] 97.4 °F (36.3 °C)  Pulse:  [58-76] 73  Resp:  [16-22] 16  SpO2:  [97 %-100 %] 100 %  BP: ()/(50-69) 117/58  Arterial Line BP: (105-148)/(46-69) 148/69     Date 10/24/24 0700 - 10/25/24 0659   Shift 9246-3725 0517-1795 2543-5970 24 Hour Total   INTAKE   I.V.(mL/kg) 50.9(0.6)   50.9(0.6)   NG/GT 50   50   Shift Total(mL/kg) 100.9(1.2)   100.9(1.2)   OUTPUT   Shift Total(mL/kg)       Weight (kg) 83.5 83.5 83.5 83.5              Vent Mode: A/C  Oxygen Concentration (%):  [40] 40  Resp Rate Total:  [16 br/min-23 br/min] 20 br/min  Vt Set:  [500 mL] 500 mL  PEEP/CPAP:  [5 cmH20] 5 cmH20  Mean Airway Pressure:  [7.7 cmH20-10 cmH20] 10 cmH20             Closed/Suction Drain 10/19/24 Tube - 1 Right Scalp Accordion 10 Fr. (Active)   Site Description Unable to view 10/24/24 0702   Dressing Type Gauze 10/24/24 0702   Dressing Status Clean;Dry;Intact 10/24/24 0702   Dressing Intervention Integrity maintained 10/24/24 0702   Drainage Bloody 10/24/24 0702   Status To bulb suction 10/24/24 0702   Output (mL) 30 mL 10/23/24 1702            Closed/Suction Drain 10/19/24 Tube - 2 Right Scalp Bulb 10 Fr. (Active)   Site Description Unable to view 10/24/24 0702   Dressing Type Gauze 10/24/24 0702   Dressing Status Clean;Dry;Intact 10/24/24 0702   Dressing Intervention Integrity maintained 10/24/24 0702   Drainage Serosanguineous 10/24/24 0702   Status To bulb suction 10/24/24 0702   Output (mL) 10 mL 10/23/24 1702            NG/OG Tube 10/23/24 2100 14 Fr. Center mouth (Active)   Placement Check placement verified by x-ray 10/24/24 0702   Tolerance no signs/symptoms of discomfort 10/24/24 0702   Securement secured to commercial device 10/24/24 0702   Clamp Status/Tolerance unclamped;no abdominal discomfort;no abdominal distention 10/24/24 0702   Suction Setting/Drainage  "Method suction at the bedside 10/24/24 0702   Insertion Site Appearance no redness, warmth, tenderness, skin breakdown, drainage 10/24/24 0702   Drainage None 10/24/24 0702   Flush/Irrigation flushed w/;water;no resistance met 10/24/24 0702   Feeding Type continuous;by pump 10/24/24 0702   Feeding Action feeding continued 10/24/24 0702   Current Rate (mL/hr) 50 mL/hr 10/24/24 0702   Goal Rate (mL/hr) 50 mL/hr 10/24/24 0702   Water Bolus (mL) 400 mL 10/24/24 0602   Formula Name Diabetic Isosource 10/24/24 0702   Intake (mL) - Formula Tube Feeding 50 10/24/24 0702            Rectal Tube 10/22/24 1718 rectal tube w/ balloon (indicate number of mLs) (Active)   Reposition drainage bags for BMS & Longoria on opposite sides of bed 10/23/24 1502   Outcome gas expelled, stool evacuated 10/24/24 0702   Stool Color Brown 10/24/24 0702   Insertion Site Appearance no redness, warmth, tenderness, skin breakdown, drainage 10/24/24 0702   Flush/Irrigation flushed w/;water;no resistance met 10/24/24 0702   Intake (mL) 40 mL 10/23/24 2102   Rectal Tube Output 300 mL 10/23/24 0602     Neurosurgery Physical Exam     [id number]  On EEG  PERRL  +c/g/c  WD RLE>LLE  No movement in LUE    Significant Labs:  Recent Labs   Lab 10/23/24  0011 10/23/24  0812 10/23/24  1909 10/24/24  0020   *  --   --  153*   * 152* 153* 151*   K 3.8  --   --  4.3   *  --   --  120*   CO2 23  --   --  24   BUN 47*  --   --  43*   CREATININE 1.4  --   --  1.3   CALCIUM 8.0*  --   --  7.9*   MG 3.0*  --   --  3.2*     Recent Labs   Lab 10/22/24  1008 10/23/24  0011 10/24/24  0020   WBC 9.26 8.37 6.26   HGB 8.1* 8.3* 7.7*   HCT 26.5* 27.7* 25.6*   * 139* 125*     No results for input(s): "LABPT", "INR", "APTT" in the last 48 hours.  Microbiology Results (last 7 days)       Procedure Component Value Units Date/Time    Blood culture [[id number]] Collected: 10/18/24 0335    Order Status: Completed Specimen: Blood from Peripheral, Foot, Right " Updated: 10/23/24 0612     Blood Culture, Routine No growth after 5 days.    Blood culture [[id number]] Collected: 10/18/24 0345    Order Status: Completed Specimen: Blood from Peripheral, Hand, Left Updated: 10/23/24 0612     Blood Culture, Routine No growth after 5 days.    Culture, Respiratory with Gram Stain [[id number]]     Order Status: No result Specimen: Respiratory           All pertinent labs from the last 24 hours have been reviewed.    Significant Diagnostics:  I have reviewed all pertinent imaging results/findings within the past 24 hours.

## 2024-10-24 NOTE — SUBJECTIVE & OBJECTIVE
Interval History:      NGT possibly coiled overnight, adjusted and able to be used. No additional seizures per epilepsy after increase in propofol yesterday. Will maintain current dose overnight. Renal function continuing to improve, Na 148 this morning.    Hgb and platelets decreasing. Receiving heparin for VTE prophylaxis. Will order peripheral smear to assess for HIT.    Objective:     Vitals:  Temp: 98.5 °F (36.9 °C)  Pulse: 78  Rhythm: normal sinus rhythm, atrial rhythm  BP: 125/65  MAP (mmHg): 87  CI (L/min/m2): (P) 2.3 L/min/m2  SVI: (P) 29  SVV: (P) 10 %  Resp: (!) 22  SpO2: 98 %  Oxygen Concentration (%): 40  Vent Mode: A/C  Set Rate: 16 BPM  Vt Set: 500 mL  PEEP/CPAP: 5 cmH20  Peak Airway Pressure: 23 cmH20  Mean Airway Pressure: 14 cmH20  Plateau Pressure: 13 cmH20    Temp  Min: 96.8 °F (36 °C)  Max: 98.5 °F (36.9 °C)  Pulse  Min: 60  Max: 81  BP  Min: 105/60  Max: 136/63  MAP (mmHg)  Min: 75  Max: 91  CVP (mean)  Min: 15 mmHg  Max: 15 mmHg  CI (L/min/m2)  Min: 2.1 L/min/m2  Max: 2.4 L/min/m2  SVI  Min: 29  Max: 35  SVV  Min: 10 %  Max: 18 %  Resp  Min: 16  Max: 22  SpO2  Min: 97 %  Max: 100 %  Oxygen Concentration (%)  Min: 40  Max: 40    10/23 0701 - 10/24 0700  In: 2762 [I.V.:647]  Out: 890 [Urine:850; Drains:40]   Unmeasured Output  Urine Occurrence: 1  Stool Occurrence: 1  Pad Count: 2        Physical Exam  Constitutional:       Appearance: He is ill-appearing.      Interventions: He is sedated and intubated.   Cardiovascular:      Rate and Rhythm: Normal rate and regular rhythm.   Pulmonary:      Effort: No respiratory distress. He is intubated.      Breath sounds: Normal breath sounds.   Abdominal:      General: Abdomen is flat. There is no distension.      Palpations: Abdomen is soft.   Musculoskeletal:         General: No swelling.   Skin:     General: Skin is warm and dry.   Neurological:      Comments: Unable to assess 2/2 level of sedatiopn            Medications:  Continuousinsulin regular 1  units/mL infusion orderable (DKA), Last Rate: 2.4 Units/hr (10/24/24 1502)  propofol, Last Rate: 50 mcg/kg/min (10/24/24 1626)    Scheduledatorvastatin, 40 mg, Daily  ceFAZolin (Ancef) IV (PEDS and ADULTS), 2 g, Q8H  famotidine, 20 mg, Daily  heparin (porcine), 5,000 Units, Q8H  lacosamide (Vimpat) IV orderable, 200 mg, Q12H  levETIRAcetam (Keppra) IV (PEDS and ADULTS), 1,000 mg, Q12H  silodosin, 4 mg, Daily    PRNacetaminophen, 650 mg, Q6H PRN  dextrose 10%, 12.5 g, PRN  dextrose 10%, 25 g, PRN  hydrALAZINE, 10 mg, Q4H PRN  labetalol, 10 mg, Q4H PRN  magnesium oxide, 800 mg, PRN  magnesium oxide, 800 mg, PRN  ondansetron, 4 mg, Q8H PRN  potassium bicarbonate, 35 mEq, PRN  potassium bicarbonate, 50 mEq, PRN  potassium bicarbonate, 60 mEq, PRN  potassium, sodium phosphates, 2 packet, PRN  potassium, sodium phosphates, 2 packet, PRN  potassium, sodium phosphates, 2 packet, PRN      Today I personally reviewed pertinent medications, laboratory results, notably:    Diet  Diet NPO Except for: Medication  Diet NPO Except for: Medication

## 2024-10-24 NOTE — PROCEDURES
ICU EEG/VIDEO MONITORING REPORT    DATE OF SERVICE: 10/23/24-10/24/24  EEG NUMBER: FH -1  [facility]    METHODOLOGY   Electroencephalographic (EEG) recording is with electrodes placed according to the International 10-20 placement system.  Thirty two (32) channels of digital signal are simultaneously recorded from the scalp and may include EKG, EMG, and/or eye monitors.   Recording band pass was 0.1 to 512 hz.  Digital video recording of the patient is simultaneously recorded with the EEG.  The nursing staff report clinical symptoms and may press an event button when the patient has symptoms of clinical interest to the treating physicians.  EEG and video recording is stored and archived in digital format.  The entire recording is visually reviewed and the times identified by computer analysis as being spikes or seizures are reviewed again.  Activation procedures which include photic stimulation, hyperventilation and instructing patients to perform simple task are done in selected patients.   Compresses spectral analysis (CSA) is also performed on the activity recorded from each individual channel.  This is displayed as a power display of frequencies from 0 to 30 Hz over time.   The CSA analysis is done and displayed continuously.  This is reviewed for asymmetries in power between homologous areas of the scalp and for presence of changes in power which canbe seen when seizures occur.  Sections of suspected abnormalities on the CSA is then compared with the original EEG recording.     [employer] software was also utilized in the review of this study.  This software suite analyzes the EEG recording in multiple domains.  Coherence and rhythmicity is computed to identify EEG sections which may contain organized seizures.  Each channel undergoes analysis to detect presence of spike and sharp waves which have special and morphological characteristic of epileptic activity.  The routine EEG recording  is converted from spacial into frequency domain.  This is then displayed comparing homologous areas to identify areas of significant asymmetry.  Algorithm to identify non-cortically generated artifact is used to separate eye movement, EMG and other artifact from the EEG.      Recording Times  Start on 10/23/24 at 07:00  Stop on 10/24/24 at 07:00  24 hours recorded    This EEG study is performed in the ICU with the patient on the following sedative medications: propofol 40     Indication: 83 y.o. 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     State of Consciousness:   Coma    Background:   The background is discontinuous  and asymmetric.  The background is burst suppressed with periods of suppression lasting up to 8 seconds.  The record is sometimes dyssynchronous with activity seen over the left and right hemisphere at different times.  Activity over the right hemisphere consists of low amplitude theta/delta activity, at times with rhythmic sharply contoured slowing in the right parasagittal chain.  Activity over the left hemisphere consists of low amplitude theta/delta slowing.    Sleep:   No sleep architecture is appreciated during the record    Epileptiform Abnormalities      Seizures/Events:   None    Impression:   Abnormal study consistent with a highly epileptogenic right hemispheric structural lesion and a severe diffuse encephalopathy. No seizures are recorded.        Mayra Trammell MD  [facility]   Department of Neurology/Epilepsy

## 2024-10-24 NOTE — SUBJECTIVE & OBJECTIVE
Interval History: EEG stable and without further seizures overnight after escalation of Propofol and anti-seizure medications 10/23. Plan to continue Propofol 50 mKm overnight prior to weaning.     Current Facility-Administered Medications   Medication Dose Route Frequency Provider Last Rate Last Admin    acetaminophen tablet 650 mg  650 mg Per OG tube Q6H PRN Saman Gonzalez MD   650 mg at 10/21/24 2316    atorvastatin tablet 40 mg  40 mg Per OG tube Daily Christina Em NP   40 mg at 10/24/24 0836    ceFAZolin SolR 2 g  2 g Intravenous Q8H Nam Reynoso MD   2 g at 10/24/24 1043    dextrose 10% bolus 125 mL 125 mL  12.5 g Intravenous PRN Nam Reynoso MD        dextrose 10% bolus 250 mL 250 mL  25 g Intravenous PRN Nam Reynoso MD        famotidine tablet 20 mg  20 mg Per OG tube Daily Saman Gonzalez MD   20 mg at 10/24/24 0836    heparin (porcine) injection 5,000 Units  5,000 Units Subcutaneous Q8H Khris Cote MD   5,000 Units at 10/24/24 0501    hydrALAZINE injection 10 mg  10 mg Intravenous Q4H PRN Radah Yan PA-C        insulin regular in 0.9 % NaCl 100 unit/100 mL (1 unit/mL) infusion  0-52 Units/hr Intravenous Continuous Nam Reynoso MD 2.3 mL/hr at 10/24/24 1102 2.3 Units/hr at 10/24/24 1102    labetalol 20 mg/4 mL (5 mg/mL) IV syring  10 mg Intravenous Q4H PRN Radha Yan PA-C        lacosamide injection 200 mg  200 mg Intravenous Q12H Dolores Moore DO   200 mg at 10/24/24 1039    levETIRAcetam injection 1,000 mg  1,000 mg Intravenous Q12H Nam Reynoso MD   1,000 mg at 10/24/24 0828    magnesium oxide split tablet 800 mg  800 mg Per OG tube PRN Radha Yan PA-C        magnesium oxide split tablet 800 mg  800 mg Per OG tube PRN Radha Yan PA-C        ondansetron injection 4 mg  4 mg Intravenous Q8H PRN Mickal, TIRSO Ortiz        potassium bicarbonate disintegrating tablet 35 mEq  35 mEq Per OG tube PRN Radha Yan PA-C         potassium bicarbonate disintegrating tablet 50 mEq  50 mEq Per OG tube PRN Radha Yan PA-C        potassium bicarbonate disintegrating tablet 60 mEq  60 mEq Per OG tube PRN Radha Yan PA-C        potassium, sodium phosphates 280-160-250 mg packet 2 packet  2 packet Per OG tube PRN Radha Yan PA-C        potassium, sodium phosphates 280-160-250 mg packet 2 packet  2 packet Per OG tube PRN Radha Yan PA-C   2 packet at 10/23/24 1220    potassium, sodium phosphates 280-160-250 mg packet 2 packet  2 packet Per OG tube PRN Radha Yan PA-C        propofol (DIPRIVAN) 10 mg/mL infusion (NON-TITRATING)  50 mcg/kg/min Intravenous Continuous Dolores Moore DO 25.1 mL/hr at 10/24/24 1102 50 mcg/kg/min at 10/24/24 1102    silodosin capsule 4 mg  4 mg Per OG tube Daily Radha Yan PA-C   4 mg at 10/24/24 0836     Continuous Infusions:   insulin regular 1 units/mL infusion orderable (DKA)  0-52 Units/hr Intravenous Continuous 2.3 mL/hr at 10/24/24 1102 2.3 Units/hr at 10/24/24 1102    propofol  50 mcg/kg/min Intravenous Continuous 25.1 mL/hr at 10/24/24 1102 50 mcg/kg/min at 10/24/24 1102       Review of Systems   Unable to perform ROS: Intubated     Objective:     Vital Signs (Most Recent):  Temp: 98.1 °F (36.7 °C) (10/24/24 1102)  Pulse: 75 (10/24/24 1102)  Resp: 20 (10/24/24 1102)  BP: (!) 123/57 (10/24/24 1102)  SpO2: 99 % (10/24/24 1102) Vital Signs (24h Range):  Temp:  [96.8 °F (36 °C)-98.1 °F (36.7 °C)] 98.1 °F (36.7 °C)  Pulse:  [60-76] 75  Resp:  [16-21] 20  SpO2:  [97 %-100 %] 99 %  BP: (105-129)/(53-69) 123/57  Arterial Line BP: (114-150)/(52-69) 138/64     Weight: 83.5 kg (184 lb)  Body mass index is 27.98 kg/m².     Physical Exam  Vitals and nursing note reviewed.   Constitutional:       Appearance: He is ill-appearing. He is not diaphoretic.   HENT:      Head:      Comments: EEG in place  R periorbital ecchymosis  Eyes:      Pupils: Pupils are equal, round, and reactive  to light.   Pulmonary:      Effort: Pulmonary effort is normal. No respiratory distress.   Musculoskeletal:      Cervical back: Neck supple.   Skin:     General: Skin is warm and dry.   Psychiatric:      Comments: Unable to assess            NEUROLOGICAL EXAMINATION:     CRANIAL NERVES     CN III, IV, VI   Pupils are equal, round, and reactive to light.       CN:   AQUILINO OU   Corneal intact OU   No spontaneous eye opening   Withdraws to noxious stimuli BLE    Exam findings to suggest seizures:  Myoclonus - no   eye twitching - no   Nystagmus - no   gaze deviation - no   waxy rigidity - no         Significant Labs: All pertinent lab results from the past 24 hours have been reviewed.    Significant Studies: I have reviewed all pertinent imaging results/findings within the past 24 hours.

## 2024-10-24 NOTE — ASSESSMENT & PLAN NOTE
LEANDRO on CKD, Cr 1.8 on initial presentation to ED and now 2.2    Avoid nephrotoxic agents  Renally dose medications  Q 1 hour I&Os  Cr remains elevated to 2.5 (10/21); Na 155; will d/c valsartan and give 1 L LR   Cr significantly improved to 1.3. Na 148. Will continue enteral free water.

## 2024-10-24 NOTE — PLAN OF CARE
[facility] Care Plan  POC reviewed with Percy WHIT Ramirez and family at 1400. Family verbalized understanding. Questions and concerns addressed. No acute events today. Pt progressing toward goals. Will continue to monitor. See below and flowsheets for full assessment and VS info.     - Non-Titrating Propofol gtt @ 50  - Insulin gtt titrated  - PRN Labetalol x1  - Bladder Scan & Straight Cath x1  - cEEG in place  - Tube Feeds continued @ 50  - Subgaleal & Subdural Drains in place    Is this a stroke patient? yes- Stroke booklet reviewed with family, risk factors identified for patient and stroke booklet remains at bedside for ongoing education.     Care individualization: frequent pulmonary toileting    Neuro:  Federico Coma Scale  Best Eye Response: 1-->(E1) none  Best Motor Response: 4-->(M4) withdraws from pain  Best Verbal Response: 1-->(V1) none  Taft Coma Scale Score: 6  Assessment Qualifiers: patient chemically sedated or paralyzed, patient intubated  Pupil PERRLA: yes     24 hr Temp:  [96.8 °F (36 °C)-98.5 °F (36.9 °C)]     CV:   Rhythm: normal sinus rhythm, atrial rhythm  BP goals:   SBP < 140  MAP > 65    Resp:      Vent Mode: A/C  Set Rate: 16 BPM  Oxygen Concentration (%): 40  Vt Set: 500 mL  PEEP/CPAP: 5 cmH20  Pressure Support: 5 cmH20    Plan: status epilepticus    GI/:     Diet/Nutrition Received: tube feeding  Last Bowel Movement: 10/24/24  Voiding Characteristics: external catheter    Intake/Output Summary (Last 24 hours) at 10/24/2024 1639  Last data filed at 10/24/2024 1602  Gross per 24 hour   Intake 2688.25 ml   Output 590 ml   Net 2098.25 ml     Unmeasured Output  Urine Occurrence: 1  Stool Occurrence: 1  Pad Count: 2    Labs/Accuchecks:  Recent Labs   Lab 10/24/24  0020   WBC 6.26   RBC 2.76*   HGB 7.7*   HCT 25.6*   *      Recent Labs   Lab 10/24/24  0020 10/24/24  0843   * 148*   K 4.3  --    CO2 24  --    *  --    BUN 43*  --    CREATININE 1.3  --    ALKPHOS 66  --    ALT <5*   --    AST 18  --    BILITOT 0.2  --       Recent Labs   Lab 10/19/24  0158   INR 1.1   APTT 25.0      Recent Labs   Lab 10/17/24  1928   TROPONINI 0.022       Electrolytes: N/A - electrolytes WDL  Accuchecks: Q1H    Gtts:   insulin regular 1 units/mL infusion orderable (DKA)  0-52 Units/hr Intravenous Continuous 2.4 mL/hr at 10/24/24 1502 2.4 Units/hr at 10/24/24 1502    propofol  50 mcg/kg/min Intravenous Continuous 25.1 mL/hr at 10/24/24 1626 50 mcg/kg/min at 10/24/24 1626       LDA/Wounds:    Mykel Risk Assessment  Sensory Perception: 2-->very limited  Moisture: 4-->rarely moist  Activity: 1-->bedfast  Mobility: 1-->completely immobile  Nutrition: 3-->adequate  Friction and Shear: 2-->potential problem  Mykel Score: 13    Is your mykel score 12 or less? no      Nurses Note -- 4 Eyes    Is there altered skin present?  yes          Second RN/Staff Member:  RADHA Mckinney      [facility]

## 2024-10-24 NOTE — ASSESSMENT & PLAN NOTE
84 y.o. 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 Given K-centra at OSH.     Taken to OR 10/18 for R frontal IPH evac via eyebrow supraorbital craniotomy. Unfortunately, interval development of large SDH on post op scan, taken back to OR emergently for acute SDH evacuation.     Imaging:  -CTH OSH 10/17: 5cm R frontal ICH with 7mm MLS and some intraventricular blood in posterior lateral vent   -CT Csp OSH 10/17: no acute processes   -rCTH 10/18: grossly stable   -CTA 10/18: post op clot evac with new holoconvexity R SDH, 12 mm MLS   -Post op CTH 10/18: s/p SDH evacuation with appropriate evac, CTA neg   -CTH 10/21: new/increased hyperdensity in the resection cavity without worsening mass effect or midline shift   -rCTH 10/21: stable    S/p R SDH evac on 10/18    Plan:  Patient admitted to ICU on telemetry, q1h neuro checks  Hold anti-plt/coag medications. Given K-centra for reversal at OSH. INR 1.2 on arrival  MRI brain w wo ordered to look for amyloid, still pending  SBP <140   Na >135  Keppra 1g q12, vimpat and prop added - f/u cEEG, management per NCC  LEANDRO management per NCC  Transfuse RBC for goal Hgb >7  No HOB restrictions  SDD to thumbprint, SG drain to full suction; abx while in place; will remove drains once EEG is taken down  WTE plan per NCC  Continue to monitor clinically, notify NSGY immediately with any changes in neuro status    Plan discussed with Dr. Condon    Dispo: admitted to ICU

## 2024-10-24 NOTE — PLAN OF CARE
[facility] - Neuro Critical Care  Discharge Reassessment    Primary Care Provider: Kasie Edmondson MD    Expected Discharge Date: 10/28/2024    Reassessment (most recent)       Discharge Reassessment - 10/24/24 1535          Discharge Reassessment    Assessment Type Discharge Planning Reassessment     Did the patient's condition or plan change since previous assessment? No     Communicated TATI with patient/caregiver No     Discharge Plan A Home with family (P)      Discharge Plan B Home (P)      DME Needed Upon Discharge  none (P)      Transition of Care Barriers None (P)      Why the patient remains in the hospital Requires continued medical care (P)         Post-Acute Status    Discharge Delays None known at this time (P)                    Pt is not medic ally ready to discharge.   SW will continue to monitor pt needs.       Discharge Plan A and Plan B have been determined by review of patient's clinical status, future medical and therapeutic needs, and coverage/benefits for post-acute care in coordination with multidisciplinary team members.    Haleigh Predomo MSW, LCSW  [facility]  Case Management Dept.

## 2024-10-24 NOTE — ASSESSMENT & PLAN NOTE
06/29/2024 echo with EF 45%, was previously 30-40%    Repeat echo shows his EF is still 45%  Holding maintenance fluids in the setting of starting enteral feeds  HDS

## 2024-10-24 NOTE — ASSESSMENT & PLAN NOTE
In setting of R IPH s/p R supraorbital craniotomy for hematoma evacuation 10/18 and R craniotomy for evacuation of post-op subdural hematoma 10/19. EEG initiated 10/21 showing right hemispheric seizures.      Recommendations:  - Continuous vEEG monitoring - no further seizures 10/23>10/24 after escalation of anesthetics   - Recommend to continue Lacosamide 200 mg BID, Levetiracetam 1000 mg BID  - Continue Propofol 50 mKm overnight  - Avoid agents that lower seizure threshold  - Management of infectious/metabolic abnormalities per NCC  - Seizure precautions    Discussed plan of care with NCC team. Will follow, please call with questions.

## 2024-10-24 NOTE — PROGRESS NOTES
[facility] - Neuro Critical Care  Neurocritical Care  Progress Note    Admit Date: 10/17/2024  Service Date: 10/23/2024  Length of Stay: 6    Subjective:     Chief Complaint: Traumatic right-sided intracerebral hemorrhage without loss of consciousness    History of Present Illness: Percy Ramirez is a 83M PMHx of HTN, HLD, DM2, CAD s/p CABG x2, Afib on Coumadin, ILD on 4L of O2, HFrEF (45%), presenting as a transfer for a large traumatic R frontal IPH. Pt initially presented to [facility] ED yesterday ago after mechanical fall and was found to have 4mm parafalcine SDH. Repeat CTH was stable. Pt was discharged home on keppra and instructed to hold his blood thinners. On 10/17/24 pt was lethargic and fell. He was brought back to  ED. Blood glucose was >600 and he was febrile (101F). CTH revealed 5cm R frontal IPH with 7mm MLS. Kcentra, 10mg vitamin K, and 3% HTS bolus were given. He was transferred to [facility] for higher level of care.     Hospital Course: 10/19/2024: POD #1 from both his MI LS as well as his right subdural/intraparenchymal hemorrhage evacuation.  He is still intubated and although sedation is off, he is minimally responsive. Coreg doubled. NG tube placed and tube feeds started. Still on insulin drip.  10/20/2024: POD #2 s/p crani for SDH evacuation. Leukocytosis noted post-op, no accompanying fever/chills. Straight cath x1. Tachycardia responding to IVF boluses. Tube feeds started, will titrate to goal. Insulin gtt continued, plans to transition to subQ tomorrow.  10/21/2021: POD#3. EEG placed this morning, revealing multiple r-sided seizures. Keppra load completed and maintenance dose increased. Concern for LUE no longer WD, stat head CT showing new/increased hyperdensity in resection bed w/o worsening mass effect or MLS. 1L LR given for LEANDRO. Will continue to monitor Na.   10/22/24: POD#4. Addl seizures despite keppra load. Vimpat and propofol started yesterday with significant reduction in number.  1U PRBC transfused overnight. Required levo for short period of time after initiation of propofol.  10/23/24: POD#5.  Patient continues to be hyponatremic.  Free water volume increased.  Patient with total of 11 seizures yesterday.  Vimpat and propofol dosing increased with improvement.    Interval History:      Patient noted to have  additional seizures, Vimpat and propofol dosing increased with good response. Enteral free water volume increased to help with hypernatremia.    Objective:     Vitals:  Temp: 97.3 °F (36.3 °C)  Pulse: 69  Rhythm: atrial rhythm  BP: 107/60  MAP (mmHg): 78  CVP (mean): 15 mmHg  CI (L/min/m2): 2.2 L/min/m2  SVI: 31  SVV: 14 %  Resp: 19  SpO2: 100 %  Oxygen Concentration (%): 40  Vent Mode: A/C  Set Rate: 16 BPM  Vt Set: 500 mL  PEEP/CPAP: 5 cmH20  Peak Airway Pressure: 19 cmH20  Mean Airway Pressure: 9.6 cmH20  Plateau Pressure: 13 cmH20    Temp  Min: 97.1 °F (36.2 °C)  Max: 98 °F (36.7 °C)  Pulse  Min: 58  Max: 92  BP  Min: 92/50  Max: 153/76  MAP (mmHg)  Min: 67  Max: 105  CVP (mean)  Min: 4 mmHg  Max: 15 mmHg  CI (L/min/m2)  Min: 2.2 L/min/m2  Max: 2.8 L/min/m2  SVI  Min: 30  Max: 36  SVV  Min: 13 %  Max: 19 %  Resp  Min: 19  Max: 25  SpO2  Min: 97 %  Max: 100 %  Oxygen Concentration (%)  Min: 40  Max: 40    10/22 0701 - 10/23 0700  In: 3755.4 [I.V.:585.9]  Out: 2144 [Urine:1750; Drains:94]   Unmeasured Output  Urine Occurrence: 1  Stool Occurrence: 1  Pad Count: 2        Physical Exam  Constitutional:       General: He is not in acute distress.     Comments: sedated   Cardiovascular:      Rate and Rhythm: Normal rate.      Comments: (Monitor reading afib?)  Pulmonary:      Effort: No respiratory distress.      Comments: intubated  Abdominal:      General: Abdomen is flat. There is no distension.   Neurological:      Comments:  Exam limited due to sedation            Medications:  Continuousinsulin regular 1 units/mL infusion orderable (DKA), Last Rate: 2.3 Units/hr (10/23/24  1902)  NORepinephrine bitartrate-D5W  propofol, Last Rate: 50 mcg/kg/min (10/23/24 1902)    Scheduledatorvastatin, 40 mg, Daily  ceFAZolin (Ancef) IV (PEDS and ADULTS), 2 g, Q8H  famotidine, 20 mg, Daily  heparin (porcine), 5,000 Units, Q8H  lacosamide (Vimpat) IV orderable, 200 mg, Q12H  levETIRAcetam (Keppra) IV (PEDS and ADULTS), 1,000 mg, Q12H  silodosin, 4 mg, Daily    PRNacetaminophen, 650 mg, Q6H PRN  dextrose 10%, 12.5 g, PRN  dextrose 10%, 25 g, PRN  hydrALAZINE, 10 mg, Q4H PRN  labetalol, 10 mg, Q4H PRN  magnesium oxide, 800 mg, PRN  magnesium oxide, 800 mg, PRN  ondansetron, 4 mg, Q8H PRN  potassium bicarbonate, 35 mEq, PRN  potassium bicarbonate, 50 mEq, PRN  potassium bicarbonate, 60 mEq, PRN  potassium, sodium phosphates, 2 packet, PRN  potassium, sodium phosphates, 2 packet, PRN  potassium, sodium phosphates, 2 packet, PRN      Today I personally reviewed pertinent medications, lines/drains/airways, laboratory results,   Diet  Diet NPO Except for: Medication  Diet NPO Except for: Medication      Assessment/Plan:     No new Assessment & Plan notes have been filed under this hospital service since the last note was generated.  Service: Neuro Critical Care        The patient is being Prophylaxed for:  Venous Thromboembolism with: Chemical  Stress Ulcer with: H2B  Ventilator Pneumonia with: chlorhexidine oral care    Activity Orders            Elevate HOB 30 (30-45 Degrees) starting at 10/22 2134    Turn patient starting at 10/18 0000    Diet NPO Except for: Medication: NPO starting at 10/17 2318          Full Code    Dolores Moore DO  Neurocritical Care  [facility] - Neuro Critical Care

## 2024-10-24 NOTE — ASSESSMENT & PLAN NOTE
Initial presentation c/f HHS w glucose > 600. Beta hydroxybutyrate and urine ketones negative. Serum CO2 22. Given SQ insulin at OSH.  [place] [facility] [place] Hb A1c 9.9% on admit.     Tube feeds started, will titrate to goal.  Insulin gtt. Tube feed rate adjustments. Will continue drip until BG normalizes.  Serum osmolality elevated 312.

## 2024-10-24 NOTE — PROGRESS NOTES
[facility] - Neuro Critical Care  Neurology-Epilepsy  Progress Note    Patient Name: Percy Ramirez  MRN: [id number]  Admission Date: 10/17/2024  Hospital Length of Stay: 7 days  Code Status: Full Code   Attending Provider: Nam Reynoso MD  Primary Care Physician: Kasie Edmondson MD   Principal Problem:Traumatic right-sided intracerebral hemorrhage without loss of consciousness    Subjective:     Hospital Course:   10/21>10/22 EEG: Recurrent right hemispheric seizures with significant improvement noted in frequency after Propofol initiated 10/21 pm.  10/22>10/23 EEG: Highly epileptogenic right hemispheric structural lesion and severe diffuse encephalopathy, 11 subclinical seizures arising from the right parietal/temporal region   10/23>10/24 EEG: No further seizures after escalation of Propofol on 10/23, highly epileptogenic right hemispheric structural lesion and a severe diffuse encephalopathy    Interval History: EEG stable and without further seizures overnight after escalation of Propofol and anti-seizure medications 10/23. Plan to continue Propofol 50 mKm overnight prior to weaning.     Current Facility-Administered Medications   Medication Dose Route Frequency Provider Last Rate Last Admin    acetaminophen tablet 650 mg  650 mg Per OG tube Q6H PRN Saman Gonzalez MD   650 mg at 10/21/24 2316    atorvastatin tablet 40 mg  40 mg Per OG tube Daily Christina Em, NP   40 mg at 10/24/24 0836    ceFAZolin SolR 2 g  2 g Intravenous Q8H Nam Reynoso MD   2 g at 10/24/24 1043    dextrose 10% bolus 125 mL 125 mL  12.5 g Intravenous PRN Nam Reynoso MD        dextrose 10% bolus 250 mL 250 mL  25 g Intravenous PRN Nam Reynoso MD        famotidine tablet 20 mg  20 mg Per OG tube Daily Saman Gonzalez MD   20 mg at 10/24/24 0836    heparin (porcine) injection 5,000 Units  5,000 Units Subcutaneous Q8H Khris Cote MD   5,000 Units at 10/24/24 0501    hydrALAZINE injection 10 mg  10 mg  Intravenous Q4H PRN Radha Yan PA-C        insulin regular in 0.9 % NaCl 100 unit/100 mL (1 unit/mL) infusion  0-52 Units/hr Intravenous Continuous Nam Reynoso MD 2.3 mL/hr at 10/24/24 1102 2.3 Units/hr at 10/24/24 1102    labetalol 20 mg/4 mL (5 mg/mL) IV syring  10 mg Intravenous Q4H PRN Radha Yan PA-C        lacosamide injection 200 mg  200 mg Intravenous Q12H Dolores Moore DO   200 mg at 10/24/24 1039    levETIRAcetam injection 1,000 mg  1,000 mg Intravenous Q12H Nam Reynoso MD   1,000 mg at 10/24/24 0828    magnesium oxide split tablet 800 mg  800 mg Per OG tube PRN Radha Yan PA-C        magnesium oxide split tablet 800 mg  800 mg Per OG tube PRN Radha Yan PA-C        ondansetron injection 4 mg  4 mg Intravenous Q8H PRN Angel Nunez PA-C        potassium bicarbonate disintegrating tablet 35 mEq  35 mEq Per OG tube PRN Radha Yan PA-C        potassium bicarbonate disintegrating tablet 50 mEq  50 mEq Per OG tube PRN Radha Yan PA-C        potassium bicarbonate disintegrating tablet 60 mEq  60 mEq Per OG tube PRN Radha Yan PA-C        potassium, sodium phosphates 280-160-250 mg packet 2 packet  2 packet Per OG tube PRN Radha Yan PA-C        potassium, sodium phosphates 280-160-250 mg packet 2 packet  2 packet Per OG tube PRN Radha Yan PA-C   2 packet at 10/23/24 1220    potassium, sodium phosphates 280-160-250 mg packet 2 packet  2 packet Per OG tube PRN Radha Yan PA-C        propofol (DIPRIVAN) 10 mg/mL infusion (NON-TITRATING)  50 mcg/kg/min Intravenous Continuous Dolores Moore, DO 25.1 mL/hr at 10/24/24 1102 50 mcg/kg/min at 10/24/24 1102    silodosin capsule 4 mg  4 mg Per OG tube Daily Radha Yan, TIRSO   4 mg at 10/24/24 0836     Continuous Infusions:   insulin regular 1 units/mL infusion orderable (DKA)  0-52 Units/hr Intravenous Continuous 2.3 mL/hr at 10/24/24 1102 2.3 Units/hr at 10/24/24 1102     propofol  50 mcg/kg/min Intravenous Continuous 25.1 mL/hr at 10/24/24 1102 50 mcg/kg/min at 10/24/24 1102       Review of Systems   Unable to perform ROS: Intubated     Objective:     Vital Signs (Most Recent):  Temp: 98.1 °F (36.7 °C) (10/24/24 1102)  Pulse: 75 (10/24/24 1102)  Resp: 20 (10/24/24 1102)  BP: (!) 123/57 (10/24/24 1102)  SpO2: 99 % (10/24/24 1102) Vital Signs (24h Range):  Temp:  [96.8 °F (36 °C)-98.1 °F (36.7 °C)] 98.1 °F (36.7 °C)  Pulse:  [60-76] 75  Resp:  [16-21] 20  SpO2:  [97 %-100 %] 99 %  BP: (105-129)/(53-69) 123/57  Arterial Line BP: (114-150)/(52-69) 138/64     Weight: 83.5 kg (184 lb)  Body mass index is 27.98 kg/m².     Physical Exam  Vitals and nursing note reviewed.   Constitutional:       Appearance: He is ill-appearing. He is not diaphoretic.   HENT:      Head:      Comments: EEG in place  R periorbital ecchymosis  Eyes:      Pupils: Pupils are equal, round, and reactive to light.   Pulmonary:      Effort: Pulmonary effort is normal. No respiratory distress.   Musculoskeletal:      Cervical back: Neck supple.   Skin:     General: Skin is warm and dry.   Psychiatric:      Comments: Unable to assess            NEUROLOGICAL EXAMINATION:     CRANIAL NERVES     CN III, IV, VI   Pupils are equal, round, and reactive to light.       CN:   AQUILINO OU   Corneal intact OU   No spontaneous eye opening   Withdraws to noxious stimuli BLE    Exam findings to suggest seizures:  Myoclonus - no   eye twitching - no   Nystagmus - no   gaze deviation - no   waxy rigidity - no         Significant Labs: All pertinent lab results from the past 24 hours have been reviewed.    Significant Studies: I have reviewed all pertinent imaging results/findings within the past 24 hours.  Assessment and Plan:     * Traumatic right-sided intracerebral hemorrhage without loss of consciousness  84 year old man with significant past medical history of HTN, HLD, DM2, CAD s/p CABG x2, Afib on Coumadin, ILD on home O2, HFrEF  [place] [facility] [place] OSH For evaluation and management of large traumatic R frontal IPH now s/p R supraorbital craniotomy for hematoma evacuation 10/18 and R craniotomy for evacuation of post-op subdural hematoma 10/19. EEG initiated 10/21 showing right hemispheric seizures.    - Management per NCC, NSGY, planning for MRI brain for further evaluation  - Drains in place    Seizures  In setting of R IPH s/p R supraorbital craniotomy for hematoma evacuation 10/18 and R craniotomy for evacuation of post-op subdural hematoma 10/19. EEG initiated 10/21 showing right hemispheric seizures.      Recommendations:  - Continuous vEEG monitoring - no further seizures 10/23>10/24 after escalation of anesthetics   - Recommend to continue Lacosamide 200 mg BID, Levetiracetam 1000 mg BID  - Continue Propofol 50 mKm overnight  - Avoid agents that lower seizure threshold  - Management of infectious/metabolic abnormalities per NCC  - Seizure precautions    Discussed plan of care with NCC team. Will follow, please call with questions.    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  - Glucose >600 on ER presentation  - Management per NCC, currently on insulin gtt    Benign hypertension with chronic kidney disease, stage III  - Vitals reviewed, management per NCC  - Carvedilol 12.5 mg BID discontinued 10/23    Major depressive disorder, recurrent episode, mild  - SSRI currently on hold, management per  [facility]        VTE Risk Mitigation (From admission, onward)           Ordered     heparin (porcine) injection 5,000 Units  Every 8 hours         10/22/24 1121     IP VTE HIGH RISK PATIENT  Once         10/17/24 2326     Place sequential compression device  Until discontinued         10/17/24 2326     Reason for No Pharmacological VTE Prophylaxis  Once        Question:  Reasons:  Answer:  Active Bleeding    10/17/24 2326                    Radha Alfonso PA-C  Neurology-Epilepsy  [facility] - Neuro Critical Care  Staff: Dr. Trammell

## 2024-10-24 NOTE — ASSESSMENT & PLAN NOTE
EEG placed morning of 10/21 due to delay in return to expected LOC  -Multiple seizures throughout the day  -Has been loaded with keppra and vimpat  -Receiving maintenance keppra and vimpat, on prop drip at 50 with good response  -Will begin weaning propofol tomorrow morning

## 2024-10-24 NOTE — ASSESSMENT & PLAN NOTE
SBP goal < 140  Prn labetalol and hydralazine   Holding scheduled antihypertensives at this time.

## 2024-10-24 NOTE — PLAN OF CARE
[facility] Care Plan    POC reviewed with Percy Ramirez at 0300. Patient unable to verbalize understanding. Questions and concerns addressed. No acute events today. Pt progressing toward goals. Will continue to monitor. See below and flowsheets for full assessment and VS info.     - SBP < 140 maintained, no prn needed  - prop continued at 50mcg/kg/min  - insulin drip continued and titrated according to nomogram  - new OGT placed b/c old OGT coiled in mouth; KUB done; nursing order: good to use  - tube feeds at goal of 50  - cEEG continued  - ordered to hold MRI until pt is not having seizures and can be taken off eeg  - bladder scan x2, straight cath x1      Is this a stroke patient? yes- Stroke booklet reviewed with patient, risk factors identified for patient and stroke booklet remains at bedside for ongoing education.     Care individualization: blankets applied, lights dimmed    Neuro:  [place] Coma Scale  Best Eye Response: 1-->(E1) none  Best Motor Response: 4-->(M4) withdraws from pain  Best Verbal Response: 1-->(V1) none  [place] Coma Scale Score: 6  Assessment Qualifiers: patient intubated, patient chemically sedated or paralyzed  Pupil PERRLA: yes     24 hr Temp:  [96.8 °F (36 °C)-98 °F (36.7 °C)]     CV:   Rhythm: normal sinus rhythm  BP goals:   SBP < 140  MAP > 65    Resp:      Vent Mode: A/C  Set Rate: 16 BPM  Oxygen Concentration (%): 40  Vt Set: 500 mL  PEEP/CPAP: 5 cmH20  Pressure Support: 5 cmH20    Plan: status epilepticus    GI/:     Diet/Nutrition Received: tube feeding, other (see comments) (currently held)  Last Bowel Movement: 10/24/24  Voiding Characteristics: external catheter    Intake/Output Summary (Last 24 hours) at 10/24/2024 0419  Last data filed at 10/24/2024 0302  Gross per 24 hour   Intake 2600.25 ml   Output 1624 ml   Net 976.25 ml     Unmeasured Output  Urine Occurrence: 1  Stool Occurrence: 1  Pad Count: 2    Labs/Accuchecks:  Recent Labs   Lab 10/24/24  0020   WBC 6.26   RBC  2.76*   HGB 7.7*   HCT 25.6*   *      Recent Labs   Lab 10/24/24  0020   *   K 4.3   CO2 24   *   BUN 43*   CREATININE 1.3   ALKPHOS 66   ALT <5*   AST 18   BILITOT 0.2      Recent Labs   Lab 10/19/24  0158   INR 1.1   APTT 25.0      Recent Labs   Lab 10/17/24  1928   TROPONINI 0.022       Electrolytes: N/A - electrolytes WDL  Accuchecks: Q1H    Gtts:   insulin regular 1 units/mL infusion orderable (DKA)  0-52 Units/hr Intravenous Continuous 1.5 mL/hr at 10/24/24 0307 1.5 Units/hr at 10/24/24 030    NORepinephrine bitartrate-D5W  0-1 mcg/kg/min Intravenous Continuous        propofol  50 mcg/kg/min Intravenous Continuous 25.1 mL/hr at 10/24/24 0302 50 mcg/kg/min at 10/24/24 030       LDA/Wounds:    Mykel Risk Assessment  Sensory Perception: 2-->very limited  Moisture: 4-->rarely moist  Activity: 1-->bedfast  Mobility: 1-->completely immobile  Nutrition: 3-->adequate  Friction and Shear: 2-->potential problem  Mykel Score: 13  Is your mykel score 12 or less? no    Nurses Note -- 4 Eyes    Is there altered skin present?  No  Second RN/Staff Member:  RADHA Parsons      Restraints:   Restraint Order  Length of Order: Order good for next 24 hours or when removed.  Date that the current order will : 10/24/24  Time that the current order will : 1503  Order Upon Application: Yes    [facility]

## 2024-10-24 NOTE — NURSING
MICHAEL Saleh notified that 12am free water flush held d/t pending verification of OGT. Na level 151, which is within the goal of 145-155. No further orders, will wait for OGT verification.     0500: Nursing order that OGT is good to use. Free water flush given as ordered at 0600.

## 2024-10-24 NOTE — PROGRESS NOTES
EEG Hook up  AM Check Electrodes had to be fixed.No    Skin Integrity: Normal   No signs of skin breakdown seen during AM check  Kaye Underwood   10/24/2024 9:16 AM

## 2024-10-24 NOTE — PROGRESS NOTES
[facility] - Neuro Critical Care  Neurocritical Care  Progress Note    Admit Date: 10/17/2024  Service Date: 10/24/2024  Length of Stay: 7    Subjective:     Chief Complaint: Traumatic right-sided intracerebral hemorrhage without loss of consciousness    History of Present Illness: Percy Ramirez is a 83M PMHx of HTN, HLD, DM2, CAD s/p CABG x2, Afib on Coumadin, ILD on 4L of O2, HFrEF (45%), presenting as a transfer for a large traumatic R frontal IPH. Pt initially presented to [facility] ED yesterday ago after mechanical fall and was found to have 4mm parafalcine SDH. Repeat CTH was stable. Pt was discharged home on keppra and instructed to hold his blood thinners. On 10/17/24 pt was lethargic and fell. He was brought back to  ED. Blood glucose was >600 and he was febrile (101F). CTH revealed 5cm R frontal IPH with 7mm MLS. Kcentra, 10mg vitamin K, and 3% HTS bolus were given. He was transferred to [facility] for higher level of care.     Hospital Course: 10/19/2024: POD #1 from both his MI LS as well as his right subdural/intraparenchymal hemorrhage evacuation.  He is still intubated and although sedation is off, he is minimally responsive. Coreg doubled. NG tube placed and tube feeds started. Still on insulin drip.  10/20/2024: POD #2 s/p crani for SDH evacuation. Leukocytosis noted post-op, no accompanying fever/chills. Straight cath x1. Tachycardia responding to IVF boluses. Tube feeds started, will titrate to goal. Insulin gtt continued, plans to transition to subQ tomorrow.  10/21/2021: POD#3. EEG placed this morning, revealing multiple r-sided seizures. Keppra load completed and maintenance dose increased. Concern for LUE no longer WD, stat head CT showing new/increased hyperdensity in resection bed w/o worsening mass effect or MLS. 1L LR given for LEANDRO. Will continue to monitor Na.   10/22/24: POD#4. Addl seizures despite keppra load. Vimpat and propofol started yesterday with significant reduction in number.  1U PRBC transfused overnight. Required levo for short period of time after initiation of propofol.  10/23/24: POD#5.  Patient continues to be hyponatremic.  Free water volume increased.  Patient with total of 11 seizures yesterday.  Vimpat and propofol dosing increased with improvement.  10/24/24: POD#6. NGT adjusted overnight. No additional seizures on current AEM regimen. Platelets and Hgb decreasing, will order peripheral smear to assess for HIT    Interval History:      NGT possibly coiled overnight, adjusted and able to be used. No additional seizures per epilepsy after increase in propofol yesterday. Will maintain current dose overnight. Renal function continuing to improve, Na 148 this morning.    Hgb and platelets decreasing. Receiving heparin for VTE prophylaxis. Will order peripheral smear to assess for HIT.    Objective:     Vitals:  Temp: 98.5 °F (36.9 °C)  Pulse: 78  Rhythm: normal sinus rhythm, atrial rhythm  BP: 125/65  MAP (mmHg): 87  CI (L/min/m2): (P) 2.3 L/min/m2  SVI: (P) 29  SVV: (P) 10 %  Resp: (!) 22  SpO2: 98 %  Oxygen Concentration (%): 40  Vent Mode: A/C  Set Rate: 16 BPM  Vt Set: 500 mL  PEEP/CPAP: 5 cmH20  Peak Airway Pressure: 23 cmH20  Mean Airway Pressure: 14 cmH20  Plateau Pressure: 13 cmH20    Temp  Min: 96.8 °F (36 °C)  Max: 98.5 °F (36.9 °C)  Pulse  Min: 60  Max: 81  BP  Min: 105/60  Max: 136/63  MAP (mmHg)  Min: 75  Max: 91  CVP (mean)  Min: 15 mmHg  Max: 15 mmHg  CI (L/min/m2)  Min: 2.1 L/min/m2  Max: 2.4 L/min/m2  SVI  Min: 29  Max: 35  SVV  Min: 10 %  Max: 18 %  Resp  Min: 16  Max: 22  SpO2  Min: 97 %  Max: 100 %  Oxygen Concentration (%)  Min: 40  Max: 40    10/23 0701 - 10/24 0700  In: 2762 [I.V.:647]  Out: 890 [Urine:850; Drains:40]   Unmeasured Output  Urine Occurrence: 1  Stool Occurrence: 1  Pad Count: 2        Physical Exam  Constitutional:       Appearance: He is ill-appearing.      Interventions: He is sedated and intubated.   Cardiovascular:      Rate and Rhythm:  Normal rate and regular rhythm.   Pulmonary:      Effort: No respiratory distress. He is intubated.      Breath sounds: Normal breath sounds.   Abdominal:      General: Abdomen is flat. There is no distension.      Palpations: Abdomen is soft.   Musculoskeletal:         General: No swelling.   Skin:     General: Skin is warm and dry.   Neurological:      Comments: Unable to assess 2/2 level of sedatiopn            Medications:  Continuousinsulin regular 1 units/mL infusion orderable (DKA), Last Rate: 2.4 Units/hr (10/24/24 1502)  propofol, Last Rate: 50 mcg/kg/min (10/24/24 1626)    Scheduledatorvastatin, 40 mg, Daily  ceFAZolin (Ancef) IV (PEDS and ADULTS), 2 g, Q8H  famotidine, 20 mg, Daily  heparin (porcine), 5,000 Units, Q8H  lacosamide (Vimpat) IV orderable, 200 mg, Q12H  levETIRAcetam (Keppra) IV (PEDS and ADULTS), 1,000 mg, Q12H  silodosin, 4 mg, Daily    PRNacetaminophen, 650 mg, Q6H PRN  dextrose 10%, 12.5 g, PRN  dextrose 10%, 25 g, PRN  hydrALAZINE, 10 mg, Q4H PRN  labetalol, 10 mg, Q4H PRN  magnesium oxide, 800 mg, PRN  magnesium oxide, 800 mg, PRN  ondansetron, 4 mg, Q8H PRN  potassium bicarbonate, 35 mEq, PRN  potassium bicarbonate, 50 mEq, PRN  potassium bicarbonate, 60 mEq, PRN  potassium, sodium phosphates, 2 packet, PRN  potassium, sodium phosphates, 2 packet, PRN  potassium, sodium phosphates, 2 packet, PRN      Today I personally reviewed pertinent medications, laboratory results, notably:    Diet  Diet NPO Except for: Medication  Diet NPO Except for: Medication    Assessment/Plan:     Neuro  * Traumatic right-sided intracerebral hemorrhage without loss of consciousness  83M PMHx of HTN, HLD, DM2, CAD s/p CABG x2, Afib on Coumadin, ILD on 4L of O2, HFrEF (45%), presenting as a transfer for a large traumatic R frontal IPH reversed with Kcentra and vitamin K with repeat INR 1.2.    Admit to [facility]  q1h neuro checks, vitals, and I&O's  CBC, CMP, mag, and phos daily   Coags, TSH, A1c, lipid panel,  UA  Echo, EKG, CXR  Given unwitnessed fall, may consider MRI/MRA for further evaluation and would like to avoid CTA with current LEANDRO on CKD  SBP <140  Na goals > 145.  PRN boluses of 3% HTS if sodium less than 145  Neurosurgery consulted   NPO   SCDs; heparin for VTE prophylaxis  PT/OT/SLP      Seizures  EEG placed morning of 10/21 due to delay in return to expected LOC  -Multiple seizures throughout the day  -Has been loaded with keppra and vimpat  -Receiving maintenance keppra and vimpat, on prop drip at 50 with good response  -Will begin weaning propofol tomorrow morning    Psychiatric  Major depressive disorder, recurrent episode, mild  Restart home SSRI once able to take p.o.    Pulmonary  ILD (interstitial lung disease)  ILD on 4L of O2    SpO2 goal > 88%  Duo nebs and tiotropium  Currently intubated    Cardiac/Vascular  Chronic combined systolic and diastolic heart failure  06/29/2024 echo with EF 45%, was previously 30-40%    Repeat echo shows his EF is still 45%  Holding maintenance fluids in the setting of starting enteral feeds  HDS    Persistent atrial fibrillation  In atrial fibrillation on admit  Hold Coumadin in setting of acute intraparenchymal hemorrhage  Heparin subcu for VTE prophylaxis    Hyperlipidemia LDL goal <100  Lipid panel showed low LDL, low HDL, low total cholesterol  Atorvastatin    Coronary artery disease  S/p CABG x2 in 2004  Patient has reportedly not been on Plavix recently because of his Coumadin      Benign hypertension with chronic kidney disease, stage III  SBP goal < 140  Prn labetalol and hydralazine   Holding scheduled antihypertensives at this time.      Renal/  LEANDRO (acute kidney injury)  LEANDRO on CKD, Cr 1.8 on initial presentation to ED and now 2.2    Avoid nephrotoxic agents  Renally dose medications  Q 1 hour I&Os  Cr remains elevated to 2.5 (10/21); Na 155; will d/c valsartan and give 1 L LR   Cr significantly improved to 1.3. Na 148. Will continue enteral free  water.    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 Beta hydroxybutyrate and urine ketones negative. Serum CO2 22. Given SQ insulin at OSH.  On arrival to [facility], . Hb A1c 9.9% on admit.     Tube feeds started, will titrate to goal.  Insulin gtt. Tube feed rate adjustments. Will continue drip until BG normalizes.  Serum osmolality elevated 312.     Other  Obstructive sleep apnea treated with BiPAP  Daily ABG          The patient is being Prophylaxed for:  Venous Thromboembolism with: Mechanical or Chemical  Stress Ulcer with: H2B  Ventilator Pneumonia with: chlorhexidine oral care    Activity Orders            Turn patient starting at 10/18 0000    Diet NPO Except for: Medication: NPO starting at 10/17 4775          Full Code    Dolores Moore DO  Neurocritical Care  [facility] - Neuro Critical Care

## 2024-10-25 LAB
ALBUMIN SERPL BCP-MCNC: 1.9 G/DL (ref 3.5–5.2)
ALLENS TEST: ABNORMAL
ALP SERPL-CCNC: 69 U/L (ref 40–150)
ALT SERPL W/O P-5'-P-CCNC: 8 U/L (ref 10–44)
ANION GAP SERPL CALC-SCNC: 9 MMOL/L (ref 8–16)
ANISOCYTOSIS BLD QL SMEAR: SLIGHT
AST SERPL-CCNC: 26 U/L (ref 10–40)
BASO STIPL BLD QL SMEAR: ABNORMAL
BASOPHILS # BLD AUTO: ABNORMAL K/UL (ref 0–0.2)
BASOPHILS NFR BLD: 0 % (ref 0–1.9)
BILIRUB SERPL-MCNC: 0.3 MG/DL (ref 0.1–1)
BUN SERPL-MCNC: 39 MG/DL (ref 8–23)
CALCIUM SERPL-MCNC: 7.9 MG/DL (ref 8.7–10.5)
CHLORIDE SERPL-SCNC: 115 MMOL/L (ref 95–110)
CO2 SERPL-SCNC: 22 MMOL/L (ref 23–29)
CREAT SERPL-MCNC: 1.3 MG/DL (ref 0.5–1.4)
DELSYS: ABNORMAL
DIFFERENTIAL METHOD BLD: ABNORMAL
EOSINOPHIL # BLD AUTO: ABNORMAL K/UL (ref 0–0.5)
EOSINOPHIL NFR BLD: 1.3 % (ref 0–8)
ERYTHROCYTE [DISTWIDTH] IN BLOOD BY AUTOMATED COUNT: 17.8 % (ref 11.5–14.5)
ERYTHROCYTE [SEDIMENTATION RATE] IN BLOOD BY WESTERGREN METHOD: 16 MM/H
EST. GFR  (NO RACE VARIABLE): 54.8 ML/MIN/1.73 M^2
FIO2: 40
GLUCOSE SERPL-MCNC: 165 MG/DL (ref 70–110)
HCO3 UR-SCNC: 25.8 MMOL/L (ref 24–28)
HCT VFR BLD AUTO: 24.4 % (ref 40–54)
HCT VFR BLD CALC: 20 %PCV (ref 36–54)
HGB BLD-MCNC: 7.6 G/DL (ref 14–18)
IMM GRANULOCYTES # BLD AUTO: ABNORMAL K/UL (ref 0–0.04)
IMM GRANULOCYTES NFR BLD AUTO: ABNORMAL % (ref 0–0.5)
LYMPHOCYTES # BLD AUTO: ABNORMAL K/UL (ref 1–4.8)
LYMPHOCYTES NFR BLD: 4.7 % (ref 18–48)
MAGNESIUM SERPL-MCNC: 3.1 MG/DL (ref 1.6–2.6)
MCH RBC QN AUTO: 28.4 PG (ref 27–31)
MCHC RBC AUTO-ENTMCNC: 31.1 G/DL (ref 32–36)
MCV RBC AUTO: 91 FL (ref 82–98)
METAMYELOCYTES NFR BLD MANUAL: 0.7 %
MODE: ABNORMAL
MONOCYTES # BLD AUTO: ABNORMAL K/UL (ref 0.3–1)
MONOCYTES NFR BLD: 11.3 % (ref 4–15)
NEUTROPHILS NFR BLD: 79.3 % (ref 38–73)
NEUTS BAND NFR BLD MANUAL: 2.7 %
NRBC BLD-RTO: 1 /100 WBC
OHS QRS DURATION: 112 MS
OHS QTC CALCULATION: 412 MS
PCO2 BLDA: 36.9 MMHG (ref 35–45)
PEEP: 5
PH SMN: 7.45 [PH] (ref 7.35–7.45)
PHOSPHATE SERPL-MCNC: 3.7 MG/DL (ref 2.7–4.5)
PLATELET # BLD AUTO: 143 K/UL (ref 150–450)
PLATELET BLD QL SMEAR: ABNORMAL
PMV BLD AUTO: 11.1 FL (ref 9.2–12.9)
PO2 BLDA: 93 MMHG (ref 80–100)
POC BE: 2 MMOL/L
POC SATURATED O2: 98 % (ref 95–100)
POC TCO2: 27 MMOL/L (ref 23–27)
POCT GLUCOSE: 119 MG/DL (ref 70–110)
POCT GLUCOSE: 134 MG/DL (ref 70–110)
POCT GLUCOSE: 142 MG/DL (ref 70–110)
POCT GLUCOSE: 151 MG/DL (ref 70–110)
POCT GLUCOSE: 161 MG/DL (ref 70–110)
POCT GLUCOSE: 164 MG/DL (ref 70–110)
POCT GLUCOSE: 165 MG/DL (ref 70–110)
POCT GLUCOSE: 173 MG/DL (ref 70–110)
POCT GLUCOSE: 178 MG/DL (ref 70–110)
POCT GLUCOSE: 178 MG/DL (ref 70–110)
POCT GLUCOSE: 185 MG/DL (ref 70–110)
POCT GLUCOSE: 187 MG/DL (ref 70–110)
POCT GLUCOSE: 251 MG/DL (ref 70–110)
POLYCHROMASIA BLD QL SMEAR: ABNORMAL
POTASSIUM SERPL-SCNC: 4.8 MMOL/L (ref 3.5–5.1)
PROT SERPL-MCNC: 5.8 G/DL (ref 6–8.4)
RBC # BLD AUTO: 2.68 M/UL (ref 4.6–6.2)
SAMPLE: ABNORMAL
SITE: ABNORMAL
SODIUM SERPL-SCNC: 146 MMOL/L (ref 136–145)
TRIGL SERPL-MCNC: 387 MG/DL (ref 30–150)
VT: 500
WBC # BLD AUTO: 6.89 K/UL (ref 3.9–12.7)

## 2024-10-25 PROCEDURE — 99900035 HC TECH TIME PER 15 MIN (STAT)

## 2024-10-25 PROCEDURE — 94761 N-INVAS EAR/PLS OXIMETRY MLT: CPT

## 2024-10-25 PROCEDURE — 25500020 PHARM REV CODE 255: Performed by: PSYCHIATRY & NEUROLOGY

## 2024-10-25 PROCEDURE — 99900026 HC AIRWAY MAINTENANCE (STAT)

## 2024-10-25 PROCEDURE — 99233 SBSQ HOSP IP/OBS HIGH 50: CPT | Mod: ,,, | Performed by: PHYSICIAN ASSISTANT

## 2024-10-25 PROCEDURE — 94003 VENT MGMT INPAT SUBQ DAY: CPT

## 2024-10-25 PROCEDURE — 25000003 PHARM REV CODE 250: Performed by: STUDENT IN AN ORGANIZED HEALTH CARE EDUCATION/TRAINING PROGRAM

## 2024-10-25 PROCEDURE — 85027 COMPLETE CBC AUTOMATED: CPT

## 2024-10-25 PROCEDURE — 27100171 HC OXYGEN HIGH FLOW UP TO 24 HOURS

## 2024-10-25 PROCEDURE — 20000000 HC ICU ROOM

## 2024-10-25 PROCEDURE — 63600175 PHARM REV CODE 636 W HCPCS: Mod: JZ,JG

## 2024-10-25 PROCEDURE — 84478 ASSAY OF TRIGLYCERIDES: CPT | Performed by: PSYCHIATRY & NEUROLOGY

## 2024-10-25 PROCEDURE — 25000003 PHARM REV CODE 250

## 2024-10-25 PROCEDURE — 99291 CRITICAL CARE FIRST HOUR: CPT | Mod: ,,, | Performed by: PSYCHIATRY & NEUROLOGY

## 2024-10-25 PROCEDURE — 84100 ASSAY OF PHOSPHORUS: CPT

## 2024-10-25 PROCEDURE — 63600175 PHARM REV CODE 636 W HCPCS

## 2024-10-25 PROCEDURE — 93005 ELECTROCARDIOGRAM TRACING: CPT

## 2024-10-25 PROCEDURE — 83735 ASSAY OF MAGNESIUM: CPT

## 2024-10-25 PROCEDURE — A9585 GADOBUTROL INJECTION: HCPCS | Performed by: PSYCHIATRY & NEUROLOGY

## 2024-10-25 PROCEDURE — C9254 INJECTION, LACOSAMIDE: HCPCS

## 2024-10-25 PROCEDURE — 93010 ELECTROCARDIOGRAM REPORT: CPT | Mod: ,,, | Performed by: INTERNAL MEDICINE

## 2024-10-25 PROCEDURE — 85007 BL SMEAR W/DIFF WBC COUNT: CPT

## 2024-10-25 PROCEDURE — 25000003 PHARM REV CODE 250: Performed by: PSYCHIATRY & NEUROLOGY

## 2024-10-25 PROCEDURE — 80053 COMPREHEN METABOLIC PANEL: CPT

## 2024-10-25 PROCEDURE — 63600175 PHARM REV CODE 636 W HCPCS: Performed by: PSYCHIATRY & NEUROLOGY

## 2024-10-25 PROCEDURE — 27200966 HC CLOSED SUCTION SYSTEM

## 2024-10-25 RX ORDER — INSULIN ASPART 100 [IU]/ML
6 INJECTION, SOLUTION INTRAVENOUS; SUBCUTANEOUS
Status: DISCONTINUED | OUTPATIENT
Start: 2024-10-25 | End: 2024-10-27

## 2024-10-25 RX ORDER — GADOBUTROL 604.72 MG/ML
9 INJECTION INTRAVENOUS
Status: COMPLETED | OUTPATIENT
Start: 2024-10-25 | End: 2024-10-25

## 2024-10-25 RX ORDER — DEXTROSE MONOHYDRATE 100 MG/ML
INJECTION, SOLUTION INTRAVENOUS CONTINUOUS PRN
Status: DISCONTINUED | OUTPATIENT
Start: 2024-10-25 | End: 2024-11-08 | Stop reason: HOSPADM

## 2024-10-25 RX ORDER — CLOBAZAM 10 MG/1
40 TABLET ORAL ONCE
Status: COMPLETED | OUTPATIENT
Start: 2024-10-25 | End: 2024-10-25

## 2024-10-25 RX ORDER — INSULIN ASPART 100 [IU]/ML
0-10 INJECTION, SOLUTION INTRAVENOUS; SUBCUTANEOUS EVERY 4 HOURS PRN
Status: DISCONTINUED | OUTPATIENT
Start: 2024-10-25 | End: 2024-11-08 | Stop reason: HOSPADM

## 2024-10-25 RX ORDER — GLUCAGON 1 MG
1 KIT INJECTION
Status: DISCONTINUED | OUTPATIENT
Start: 2024-10-25 | End: 2024-11-08 | Stop reason: HOSPADM

## 2024-10-25 RX ORDER — CLOBAZAM 10 MG/1
20 TABLET ORAL EVERY 12 HOURS
Status: DISCONTINUED | OUTPATIENT
Start: 2024-10-25 | End: 2024-10-28

## 2024-10-25 RX ORDER — INSULIN GLARGINE 100 [IU]/ML
25 INJECTION, SOLUTION SUBCUTANEOUS DAILY
Status: DISCONTINUED | OUTPATIENT
Start: 2024-10-25 | End: 2024-10-28

## 2024-10-25 RX ADMIN — HEPARIN SODIUM 5000 UNITS: 5000 INJECTION INTRAVENOUS; SUBCUTANEOUS at 09:10

## 2024-10-25 RX ADMIN — INSULIN HUMAN 3.5 UNITS/HR: 1 INJECTION, SOLUTION INTRAVENOUS at 05:10

## 2024-10-25 RX ADMIN — PROPOFOL 50 MCG/KG/MIN: 10 INJECTION, EMULSION INTRAVENOUS at 06:10

## 2024-10-25 RX ADMIN — LEVETIRACETAM 1000 MG: 100 INJECTION INTRAVENOUS at 07:10

## 2024-10-25 RX ADMIN — LACOSAMIDE 200 MG: 10 INJECTION INTRAVENOUS at 11:10

## 2024-10-25 RX ADMIN — LABETALOL HYDROCHLORIDE 10 MG: 5 INJECTION, SOLUTION INTRAVENOUS at 09:10

## 2024-10-25 RX ADMIN — PROPOFOL 50 MCG/KG/MIN: 10 INJECTION, EMULSION INTRAVENOUS at 07:10

## 2024-10-25 RX ADMIN — SILODOSIN 4 MG: 4 CAPSULE ORAL at 08:10

## 2024-10-25 RX ADMIN — HEPARIN SODIUM 5000 UNITS: 5000 INJECTION INTRAVENOUS; SUBCUTANEOUS at 02:10

## 2024-10-25 RX ADMIN — CLOBAZAM 20 MG: 10 TABLET ORAL at 09:10

## 2024-10-25 RX ADMIN — LACOSAMIDE 200 MG: 10 INJECTION INTRAVENOUS at 09:10

## 2024-10-25 RX ADMIN — INSULIN ASPART 6 UNITS: 100 INJECTION, SOLUTION INTRAVENOUS; SUBCUTANEOUS at 12:10

## 2024-10-25 RX ADMIN — PROPOFOL 50 MCG/KG/MIN: 10 INJECTION, EMULSION INTRAVENOUS at 11:10

## 2024-10-25 RX ADMIN — INSULIN HUMAN 1.5 UNITS/HR: 1 INJECTION, SOLUTION INTRAVENOUS at 06:10

## 2024-10-25 RX ADMIN — CLOBAZAM 40 MG: 10 TABLET ORAL at 11:10

## 2024-10-25 RX ADMIN — FAMOTIDINE 20 MG: 20 TABLET ORAL at 08:10

## 2024-10-25 RX ADMIN — INSULIN HUMAN 5.5 UNITS/HR: 1 INJECTION, SOLUTION INTRAVENOUS at 01:10

## 2024-10-25 RX ADMIN — PROPOFOL 50 MCG/KG/MIN: 10 INJECTION, EMULSION INTRAVENOUS at 03:10

## 2024-10-25 RX ADMIN — INSULIN ASPART 6 UNITS: 100 INJECTION, SOLUTION INTRAVENOUS; SUBCUTANEOUS at 08:10

## 2024-10-25 RX ADMIN — INSULIN ASPART 6 UNITS: 100 INJECTION, SOLUTION INTRAVENOUS; SUBCUTANEOUS at 03:10

## 2024-10-25 RX ADMIN — INSULIN GLARGINE 25 UNITS: 100 INJECTION, SOLUTION SUBCUTANEOUS at 11:10

## 2024-10-25 RX ADMIN — ACETAMINOPHEN 650 MG: 325 TABLET ORAL at 08:10

## 2024-10-25 RX ADMIN — ATORVASTATIN CALCIUM 40 MG: 40 TABLET, FILM COATED ORAL at 08:10

## 2024-10-25 RX ADMIN — GADOBUTROL 9 ML: 604.72 INJECTION INTRAVENOUS at 08:10

## 2024-10-25 RX ADMIN — INSULIN ASPART 3 UNITS: 100 INJECTION, SOLUTION INTRAVENOUS; SUBCUTANEOUS at 08:10

## 2024-10-25 RX ADMIN — CEFAZOLIN 2 G: 2 INJECTION, POWDER, FOR SOLUTION INTRAMUSCULAR; INTRAVENOUS at 02:10

## 2024-10-25 RX ADMIN — HEPARIN SODIUM 5000 UNITS: 5000 INJECTION INTRAVENOUS; SUBCUTANEOUS at 05:10

## 2024-10-25 RX ADMIN — PROPOFOL 50 MCG/KG/MIN: 10 INJECTION, EMULSION INTRAVENOUS at 09:10

## 2024-10-25 RX ADMIN — LEVETIRACETAM 1000 MG: 100 INJECTION INTRAVENOUS at 08:10

## 2024-10-25 NOTE — ASSESSMENT & PLAN NOTE
84 year old man with significant past medical history of HTN, HLD, DM2, CAD s/p CABG x2, Afib on Coumadin, ILD on home O2, HFrEF admitted [place] [facility] [place] OSH For evaluation and management of large traumatic R frontal IPH now s/p R supraorbital craniotomy for hematoma evacuation 10/18 and R craniotomy for evacuation of post-op subdural hematoma 10/19. EEG initiated 10/21 showing right hemispheric seizures.    - Management per NCC, NSGY, planning for MRI brain for further evaluation  - Drain removal 10/25 per NSGY  - MRI brain 10/25

## 2024-10-25 NOTE — PLAN OF CARE
"[facility] Care Plan  POC reviewed with Percy Ramirez and family at 1400. Patient and Family verbalized understanding. Questions and concerns addressed. No acute events today. Pt progressing toward goals. Will continue to monitor. See below and flowsheets for full assessment and VS info.             Is this a stroke patient? yes- Stroke booklet reviewed with patient and family, risk factors identified for patient and stroke booklet remains at bedside for ongoing education.     Care individualization:extra blankets    Neuro:  [place] Coma Scale  Best Eye Response: 1-->(E1) none  Best Motor Response: 3-->(M3) flexion to pain  Best Verbal Response: 1-->(V1) none  [place] Coma Scale Score: 5  Assessment Qualifiers: patient chemically sedated or paralyzed  Pupil PERRLA: yes     24 hr Temp:  [98.5 °F (36.9 °C)-100.1 °F (37.8 °C)]     CV:   Rhythm: normal sinus rhythm  BP goals:   SBP < 140  MAP > 65    Resp:      Vent Mode: A/C  Set Rate: 16 BPM  Oxygen Concentration (%): 40  Vt Set: 500 mL  PEEP/CPAP: 5 cmH20  Pressure Support: 5 cmH20    Plan: N/A    GI/:     Diet/Nutrition Received: tube feeding  Last Bowel Movement: 10/25/24  Voiding Characteristics: external catheter    Intake/Output Summary (Last 24 hours) at 10/25/2024 1636  Last data filed at 10/25/2024 1600  Gross per 24 hour   Intake 2467.06 ml   Output 60 ml   Net 2407.06 ml     Unmeasured Output  Urine Occurrence: 1  Stool Occurrence: 1  Pad Count: 2    Labs/Accuchecks:  Recent Labs   Lab 10/25/24  0014 10/25/24  0418   WBC 6.89  --    RBC 2.68*  --    HGB 7.6*  --    HCT 24.4* 20*   *  --       Recent Labs   Lab 10/25/24  0014   *   K 4.8   CO2 22*   *   BUN 39*   CREATININE 1.3   ALKPHOS 69   ALT 8*   AST 26   BILITOT 0.3      Recent Labs   Lab 10/19/24  0158   INR 1.1   APTT 25.0    No results for input(s): "CPK", "CPKMB", "TROPONINI", "MB" in the last 168 hours.    Electrolytes: N/A - electrolytes WDL  Accuchecks: Q4H    Gtts:   D10W   " Intravenous Continuous PRN        propofol  50 mcg/kg/min Intravenous Continuous 25.1 mL/hr at 10/25/24 1600 50 mcg/kg/min at 10/25/24 1600       LDA/Wounds:    Mykel Risk Assessment  Sensory Perception: 2-->very limited  Moisture: 3-->occasionally moist  Activity: 1-->bedfast  Mobility: 1-->completely immobile  Nutrition: 3-->adequate  Friction and Shear: 2-->potential problem  Mykel Score: 12    Is your mykel score 12 or less? no      Nurses Note -- 4 Eyes    Is there altered skin present?  No  Second RN/Staff Member:  RADHA Hassan      Restraints:   Restraint Order  Length of Order: Order good for next 24 hours or when removed.  Date that the current order will : 10/24/24  Time that the current order will : 1503  Order Upon Application: Yes    [facility]

## 2024-10-25 NOTE — ASSESSMENT & PLAN NOTE
84 y.o. 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 Given K-centra at OSH.     Taken to OR 10/18 for R frontal IPH evac via eyebrow supraorbital craniotomy. Unfortunately, interval development of large SDH on post op scan, taken back to OR emergently for acute SDH evacuation.    Imaging:  -CTH OSH 10/17: 5cm R frontal ICH with 7mm MLS and some intraventricular blood in posterior lateral vent   -CT Csp OSH 10/17: no acute processes   -rCTH 10/18: grossly stable   -CTA 10/18: post op clot evac with new holoconvexity R SDH, 12 mm MLS   -Post op CTH 10/18: s/p SDH evacuation with appropriate evac, CTA neg   -CTH 10/21: new/increased hyperdensity in the resection cavity without worsening mass effect or midline shift   -rCTH 10/21: stable    S/p R SDH evac on 10/18    Plan:  Patient admitted to ICU on telemetry, q1h neuro checks  Hold anti-plt/coag medications. Given K-centra for reversal at OSH. INR 1.2 on arrival  MRI brain w wo ordered to look for amyloid, still pending  SBP <140   Na >135  Keppra 1g q12, vimpat and prop added - f/u cEEG, management per NCC  LEANDRO management per NCC  Transfuse RBC for goal Hgb >7  No HOB restrictions  Drains removed with stitch today  WTE plan per NCC  Continue to monitor clinically, notify NSGY immediately with any changes in neuro status    Plan discussed with Dr. Condon    Dispo: admitted to ICU

## 2024-10-25 NOTE — ASSESSMENT & PLAN NOTE
LEANDRO on CKD, Cr 1.8 on initial presentation to ED and now 2.2    Avoid nephrotoxic agents  Renally dose medications  Q 1 hour I&Os  Cr remains elevated to 2.5 (10/21); Na 155; will d/c valsartan and give 1 L LR   Cr significantly improved to 1.3. Na 146. Will continue enteral free water at decreased rate

## 2024-10-25 NOTE — PROGRESS NOTES
[facility] - Neuro Critical Care  Neurology-Epilepsy  Progress Note    Patient Name: Percy Ramirez  MRN: [id number]  Admission Date: 10/17/2024  Hospital Length of Stay: 8 days  Code Status: Full Code   Attending Provider: Nam Reynoso MD  Primary Care Physician: Kasie Edmondson MD   Principal Problem:Traumatic right-sided intracerebral hemorrhage without loss of consciousness    Subjective:     Hospital Course:   10/21>10/22 EEG: Recurrent right hemispheric seizures with significant improvement noted in frequency after Propofol initiated 10/21 pm.  10/22>10/23 EEG: Highly epileptogenic right hemispheric structural lesion and severe diffuse encephalopathy, 11 subclinical seizures arising from the right parietal/temporal region   10/23>10/24 EEG: No further seizures after escalation of Propofol on 10/23, highly epileptogenic right hemispheric structural lesion and a severe diffuse encephalopathy  10/24>10/25 EEG: Highly epileptogenic right hemispheric structural lesion and a severe diffuse encephalopathy, one subclinical seizure at 19:14 on 10/24    Interval History: One subclinical seizure noted on EEG overnight. EEG removed for MRI and to remove drains, plan to rehook this afternoon/tomorrow am. Escalate AED regimen prior to Propofol wean.     Current Facility-Administered Medications   Medication Dose Route Frequency Provider Last Rate Last Admin    acetaminophen tablet 650 mg  650 mg Per OG tube Q6H PRN Saman Gonzalez MD   650 mg at 10/25/24 0827    atorvastatin tablet 40 mg  40 mg Per OG tube Daily Christina Em NP   40 mg at 10/25/24 0828    cloBAZam Tab 20 mg  20 mg Per OG tube Q12H Dolores Moore, DO        dextrose 10 % infusion   Intravenous Continuous PRN Dolores Moore,         dextrose 10% bolus 125 mL 125 mL  12.5 g Intravenous PRN Dolores Moore, DO        dextrose 10% bolus 250 mL 250 mL  25 g Intravenous PRN Dolores Moore, DO        famotidine tablet 20 mg  20 mg Per OG tube Daily Pepper,  Saman Valles MD   20 mg at 10/25/24 0828    glucagon (human recombinant) injection 1 mg  1 mg Intramuscular PRN Dolores Moore DO        heparin (porcine) injection 5,000 Units  5,000 Units Subcutaneous Q8H Khris Cote MD   5,000 Units at 10/25/24 0512    hydrALAZINE injection 10 mg  10 mg Intravenous Q4H PRN Radha Yan PA-C        insulin aspart U-100 pen 0-10 Units  0-10 Units Subcutaneous Q4H PRN Dolores Moore DO        insulin aspart U-100 pen 6 Units  6 Units Subcutaneous 6 times per day Dolores Moore DO   6 Units at 10/25/24 1243    insulin glargine U-100 (Lantus) pen 25 Units  25 Units Subcutaneous Daily Dolores Moore DO   25 Units at 10/25/24 1133    insulin regular in 0.9 % NaCl 100 unit/100 mL (1 unit/mL) infusion  0-52 Units/hr Intravenous Continuous Dolores Moore DO 1.5 mL/hr at 10/25/24 0623 1.5 Units/hr at 10/25/24 0623    labetalol 20 mg/4 mL (5 mg/mL) IV syring  10 mg Intravenous Q4H PRN Radha Yan PA-C   10 mg at 10/24/24 1936    lacosamide injection 200 mg  200 mg Intravenous Q12H Dolores Moore DO   200 mg at 10/25/24 1158    levETIRAcetam injection 1,000 mg  1,000 mg Intravenous Q12H Nam Reynoso MD   1,000 mg at 10/25/24 0828    magnesium oxide split tablet 800 mg  800 mg Per OG tube PRN Radha Yan PA-C        magnesium oxide split tablet 800 mg  800 mg Per OG tube PRN Radha Yan PA-C        ondansetron injection 4 mg  4 mg Intravenous Q8H PRN Angel Nunez PA-C        potassium bicarbonate disintegrating tablet 35 mEq  35 mEq Per OG tube PRN Radha Yan PA-C        potassium bicarbonate disintegrating tablet 50 mEq  50 mEq Per OG tube PRN Radha Yan PA-C        potassium bicarbonate disintegrating tablet 60 mEq  60 mEq Per OG tube PRN Radha Yan PA-C        potassium, sodium phosphates 280-160-250 mg packet 2 packet  2 packet Per OG tube PRN Radha Yan PA-C        potassium, sodium phosphates 280-160-250 mg packet 2  packet  2 packet Per OG tube PRN Radha Yan PA-C   2 packet at 10/23/24 1220    potassium, sodium phosphates 280-160-250 mg packet 2 packet  2 packet Per OG tube PRN Radha Yan PA-C        propofol (DIPRIVAN) 10 mg/mL infusion (NON-TITRATING)  50 mcg/kg/min Intravenous Continuous RaschTonyby, DO 25.1 mL/hr at 10/25/24 1200 50 mcg/kg/min at 10/25/24 1200    silodosin capsule 4 mg  4 mg Per OG tube Daily Radha Yan PA-C   4 mg at 10/25/24 0828     Continuous Infusions:   D10W   Intravenous Continuous PRN        insulin regular 1 units/mL infusion orderable (DKA)  0-52 Units/hr Intravenous Continuous 1.5 mL/hr at 10/25/24 0623 1.5 Units/hr at 10/25/24 0623    propofol  50 mcg/kg/min Intravenous Continuous 25.1 mL/hr at 10/25/24 1200 50 mcg/kg/min at 10/25/24 1200       Review of Systems   Unable to perform ROS: Intubated     Objective:     Vital Signs (Most Recent):  Temp: 100.1 °F (37.8 °C) (10/25/24 0827)  Pulse: 64 (10/25/24 1200)  Resp: 12 (10/25/24 1200)  BP: 125/60 (10/25/24 1200)  SpO2: 100 % (10/25/24 1200) Vital Signs (24h Range):  Temp:  [98.5 °F (36.9 °C)-100.1 °F (37.8 °C)] 100.1 °F (37.8 °C)  Pulse:  [64-84] 64  Resp:  [12-24] 12  SpO2:  [95 %-100 %] 100 %  BP: (113-146)/(56-74) 125/60  Arterial Line BP: (124-172)/(50-80) 137/57     Weight: 83.5 kg (184 lb)  Body mass index is 27.98 kg/m².     Physical Exam  Vitals and nursing note reviewed.   Constitutional:       Appearance: He is ill-appearing. He is not diaphoretic.   HENT:      Head:      Comments: EEG in place  R periorbital ecchymosis  Eyes:      Pupils: Pupils are equal, round, and reactive to light.   Pulmonary:      Effort: Pulmonary effort is normal. No respiratory distress.   Musculoskeletal:      Cervical back: Neck supple.   Skin:     General: Skin is warm and dry.   Psychiatric:      Comments: Unable to assess            NEUROLOGICAL EXAMINATION:     CRANIAL NERVES     CN III, IV, VI   Pupils are equal, round, and  reactive to light.       Sedated on Propofol  CN:   AQUILINO OU   No spontaneous eye opening   No withdrawal to noxious stimuli BLE    Exam findings to suggest seizures:  Myoclonus - no   eye twitching - no   Nystagmus - no   gaze deviation - no   waxy rigidity - no         Significant Labs: All pertinent lab results from the past 24 hours have been reviewed.    Significant Studies: I have reviewed all pertinent imaging results/findings within the past 24 hours.  Assessment and Plan:     * Traumatic right-sided intracerebral hemorrhage without loss of consciousness  84 year old man with significant past medical history of HTN, HLD, DM2, CAD s/p CABG x2, Afib on Coumadin, ILD on home O2, HFrEF admitted to [facility] as transfer from OSH For evaluation and management of large traumatic R frontal IPH now s/p R supraorbital craniotomy for hematoma evacuation 10/18 and R craniotomy for evacuation of post-op subdural hematoma 10/19. EEG initiated 10/21 showing right hemispheric seizures.    - Management per NCC, NSGY, planning for MRI brain for further evaluation  - Drain removal 10/25 per NSGY  - MRI brain 10/25    Seizures  In setting of R IPH s/p R supraorbital craniotomy for hematoma evacuation 10/18 and R craniotomy for evacuation of post-op subdural hematoma 10/19. EEG initiated 10/21 showing right hemispheric seizures.      Recommendations:  - Continuous vEEG monitoring - 1 seizure 10/24>10/25  - Add Clobazam 40 mg x1 followed by 20 mg BID maintenance dose   - Recommend to continue Lacosamide 200 mg BID, Levetiracetam 1000 mg BID  - Continue Propofol 50 mKm overnight  - Avoid agents that lower seizure threshold  - Management of infectious/metabolic abnormalities per NCC  - Seizure precautions    Discussed plan of care with NCC team. Will follow, please call with questions.    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  - Glucose >600 on ER presentation  - Management per [facility], transitioned to scheduled insulin regimen    Benign hypertension with chronic kidney disease, stage III  - Vitals reviewed, management per [facility]  - Carvedilol 12.5 mg BID discontinued 10/23    Major depressive disorder, recurrent episode, mild  - SSRI currently on hold, management per [facility]        VTE Risk Mitigation (From admission, onward)           Ordered     heparin (porcine) injection 5,000 Units  Every 8 hours         10/22/24 1121     IP VTE HIGH RISK PATIENT  Once         10/17/24 2326     Place sequential compression device  Until discontinued         10/17/24 2326     Reason for No Pharmacological VTE Prophylaxis  Once        Question:  Reasons:  Answer:  Active Bleeding    10/17/24 2326                    Radha Alfonso PA-C  Neurology-Epilepsy  [facility] - Neuro Critical Care  Staff: Dr. Trammell

## 2024-10-25 NOTE — ASSESSMENT & PLAN NOTE
In setting of R IPH s/p R supraorbital craniotomy for hematoma evacuation 10/18 and R craniotomy for evacuation of post-op subdural hematoma 10/19. EEG initiated 10/21 showing right hemispheric seizures.      Recommendations:  - Continuous vEEG monitoring - 1 seizure 10/24>10/25  - Add Clobazam 40 mg x1 followed by 20 mg BID maintenance dose   - Recommend to continue Lacosamide 200 mg BID, Levetiracetam 1000 mg BID  - Continue Propofol 50 mKm overnight  - Avoid agents that lower seizure threshold  - Management of infectious/metabolic abnormalities per NCC  - Seizure precautions    Discussed plan of care with NCC team. Will follow, please call with questions.

## 2024-10-25 NOTE — PLAN OF CARE
Pt is not medically ready to discharge.  SW will continue to monitor pt needs.      Discharge Plan A and Plan B have been determined by review of patient's clinical status, future medical and therapeutic needs, and coverage/benefits for post-acute care in coordination with multidisciplinary team members.    Haleigh Perdomo MSW, FIONAW  [facility]  Case Management Dept.

## 2024-10-25 NOTE — PROGRESS NOTES
[facility] - Neuro Critical Care  Neurocritical Care  Progress Note    Admit Date: 10/17/2024  Service Date: 10/25/2024  Length of Stay: 8    Subjective:     Chief Complaint: Traumatic right-sided intracerebral hemorrhage without loss of consciousness    History of Present Illness: Percy Ramirez is a 83M PMHx of HTN, HLD, DM2, CAD s/p CABG x2, Afib on Coumadin, ILD on 4L of O2, HFrEF (45%), presenting as a transfer for a large traumatic R frontal IPH. Pt initially presented to [facility] ED yesterday ago after mechanical fall and was found to have 4mm parafalcine SDH. Repeat CTH was stable. Pt was discharged home on keppra and instructed to hold his blood thinners. On 10/17/24 pt was lethargic and fell. He was brought back to  ED. Blood glucose was >600 and he was febrile (101F). CTH revealed 5cm R frontal IPH with 7mm MLS. Kcentra, 10mg vitamin K, and 3% HTS bolus were given. He was transferred to [facility] for higher level of care.     Hospital Course: 10/19/2024: POD #1 from both his MI LS as well as his right subdural/intraparenchymal hemorrhage evacuation.  He is still intubated and although sedation is off, he is minimally responsive. Coreg doubled. NG tube placed and tube feeds started. Still on insulin drip.  10/20/2024: POD #2 s/p crani for SDH evacuation. Leukocytosis noted post-op, no accompanying fever/chills. Straight cath x1. Tachycardia responding to IVF boluses. Tube feeds started, will titrate to goal. Insulin gtt continued, plans to transition to subQ tomorrow.  10/21/2021: POD#3. EEG placed this morning, revealing multiple r-sided seizures. Keppra load completed and maintenance dose increased. Concern for LUE no longer WD, stat head CT showing new/increased hyperdensity in resection bed w/o worsening mass effect or MLS. 1L LR given for LEANDRO. Will continue to monitor Na.   10/22/24: POD#4. Addl seizures despite keppra load. Vimpat and propofol started yesterday with significant reduction in number.  1U PRBC transfused overnight. Required levo for short period of time after initiation of propofol.  10/23/24: POD#5.  Patient continues to be hyponatremic.  Free water volume increased.  Patient with total of 11 seizures yesterday.  Vimpat and propofol dosing increased with improvement.  10/24/24: POD#6. NGT adjusted overnight. No additional seizures on current AEM regimen. Platelets and Hgb decreasing, will order peripheral smear to assess for HIT  10/25/2024 POD#7. Addl seizure, Onfi added to AEM regimen. Drains removed by NSGY. Will attempt to wean propofol tomorrow.     Interval History:      Additional seizure overnight.  Onfi started this morning.  Eeg temporarily discontinued with the patient's drains could be removed and he could go for MRI.  Plan to wean propofol tomorrow.  Insulin drip transitioned to subcu insulin.    Objective:     Vitals:  Temp: 100.1 °F (37.8 °C)  Pulse: 64  Rhythm: normal sinus rhythm  BP: 125/60  MAP (mmHg): 87  CI (L/min/m2): 2.9 L/min/m2  SVI: 34  SVV: 13 %  Resp: 12  SpO2: 100 %  Oxygen Concentration (%): 40  Vent Mode: A/C  Set Rate: 16 BPM  Vt Set: 500 mL  PEEP/CPAP: 5 cmH20  Peak Airway Pressure: 16 cmH20  Mean Airway Pressure: 8.3 cmH20  Plateau Pressure: 13 cmH20    Temp  Min: 98.5 °F (36.9 °C)  Max: 100.1 °F (37.8 °C)  Pulse  Min: 64  Max: 84  BP  Min: 113/57  Max: 146/70  MAP (mmHg)  Min: 81  Max: 101  CI (L/min/m2)  Min: 2.3 L/min/m2  Max: 2.9 L/min/m2  SVI  Min: 29  Max: 34  SVV  Min: 10 %  Max: 13 %  Resp  Min: 12  Max: 24  SpO2  Min: 95 %  Max: 100 %  Oxygen Concentration (%)  Min: 40  Max: 40    10/24 0701 - 10/25 0700  In: 2545.3 [I.V.:430.3]  Out: 660 [Urine:600; Drains:60]   Unmeasured Output  Urine Occurrence: 1  Stool Occurrence: 1  Pad Count: 2        Physical Exam  Constitutional:       Interventions: He is sedated and intubated.   Cardiovascular:      Rate and Rhythm: Normal rate and regular rhythm.   Pulmonary:      Effort: Pulmonary effort is normal. No  respiratory distress. He is intubated.      Breath sounds: Normal breath sounds.   Abdominal:      General: There is no distension.   Skin:     General: Skin is warm and dry.   Neurological:      Comments: Unable to assess secondary to sedation              Medications:  [id number]  insulin regular 1 units/mL infusion orderable (DKA), Last Rate: 1.5 Units/hr (10/25/24 0623)  propofol, Last Rate: 50 mcg/kg/min (10/25/24 1200)    Scheduledatorvastatin, 40 mg, Daily  cloBAZam, 20 mg, Q12H  famotidine, 20 mg, Daily  heparin (porcine), 5,000 Units, Q8H  insulin aspart U-100, 6 Units, 6 times per day  insulin glargine U-100, 25 Units, Daily  lacosamide (Vimpat) IV orderable, 200 mg, Q12H  levETIRAcetam (Keppra) IV (PEDS and ADULTS), 1,000 mg, Q12H  silodosin, 4 mg, Daily    PRNacetaminophen, 650 mg, Q6H PRN  D10W, , Continuous PRN  dextrose 10%, 12.5 g, PRN  dextrose 10%, 25 g, PRN  glucagon (human recombinant), 1 mg, PRN  hydrALAZINE, 10 mg, Q4H PRN  insulin aspart U-100, 0-10 Units, Q4H PRN  labetalol, 10 mg, Q4H PRN  magnesium oxide, 800 mg, PRN  magnesium oxide, 800 mg, PRN  ondansetron, 4 mg, Q8H PRN  potassium bicarbonate, 35 mEq, PRN  potassium bicarbonate, 50 mEq, PRN  potassium bicarbonate, 60 mEq, PRN  potassium, sodium phosphates, 2 packet, PRN  potassium, sodium phosphates, 2 packet, PRN  potassium, sodium phosphates, 2 packet, PRN      Today I personally reviewed pertinent medications, laboratory results, notably:    Diet  Diet NPO Except for: Medication  Diet NPO Except for: Medication    Assessment/Plan:     Neuro  * Traumatic right-sided intracerebral hemorrhage without loss of consciousness  83M PMHx of HTN, HLD, DM2, CAD s/p CABG x2, Afib on Coumadin, ILD on 4L of O2, HFrEF (45%), presenting as a transfer for a large traumatic R frontal IPH reversed with Kcentra and vitamin K with repeat INR 1.2.    Admit to NCC  q1h neuro checks, vitals, and I&O's  CBC, CMP, mag, and phos daily   Coags, TSH, A1c,  lipid panel, UA  Echo, EKG, CXR  SBP <140  Na goals > 145.  PRN boluses of 3% HTS if sodium less than 145  Neurosurgery consulted   NPO   SCDs; heparin for VTE prophylaxis  PT/OT/SLP    MRI today, 10/25  Drains removed today    Seizures  EEG placed morning of 10/21 due to delay in return to expected LOC  -Multiple seizures throughout the day  -Has been loaded with keppra and vimpat  -Receiving maintenance keppra and vimpat, on prop drip at 50 with good response  -Addl seizure evening of 10/24, Onfi added on. Will wean propofol tomorrow    Psychiatric  Major depressive disorder, recurrent episode, mild  Restart home SSRI once able to take p.o.    Pulmonary  ILD (interstitial lung disease)  ILD on 4L of O2    SpO2 goal > 88%  Duo nebs and tiotropium  Currently intubated    Cardiac/Vascular  Chronic combined systolic and diastolic heart failure  06/29/2024 echo with EF 45%, was previously 30-40%    Repeat echo shows his EF is still 45%  Holding maintenance fluids in the setting of starting enteral feeds  HDS    Persistent atrial fibrillation  In atrial fibrillation on admit  Hold Coumadin in setting of acute intraparenchymal hemorrhage  Heparin subcu for VTE prophylaxis    Hyperlipidemia LDL goal <100  Lipid panel showed low LDL, low HDL, low total cholesterol  Atorvastatin    Coronary artery disease  S/p CABG x2 in 2004  Patient has reportedly not been on Plavix recently because of his Coumadin      Benign hypertension with chronic kidney disease, stage III  SBP goal < 140  Prn labetalol and hydralazine   Holding scheduled antihypertensives at this time.      Renal/  LEANDRO (acute kidney injury)  LEANDRO on CKD, Cr 1.8 on initial presentation to ED and now 2.2    Avoid nephrotoxic agents  Renally dose medications  Q 1 hour I&Os  Cr remains elevated to 2.5 (10/21); Na 155; will d/c valsartan and give 1 L LR   Cr significantly improved to 1.3. Na 146. 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 Beta hydroxybutyrate and urine ketones negative. Serum CO2 22. Given SQ insulin at OSH.  On arrival to [facility], . Hb A1c 9.9% on admit.     Tube feeds started, will titrate to goal.  Insulin gtt transitioned to subcu insulin.   Serum osmolality elevated 312.     Other  Obstructive sleep apnea treated with BiPAP  Daily ABG          The patient is being Prophylaxed for:  Venous Thromboembolism with: Mechanical or Chemical  Stress Ulcer with: H2B  Ventilator Pneumonia with: chlorhexidine oral care    Activity Orders            Turn patient starting at 10/18 0000    Diet NPO Except for: Medication: NPO starting at 10/17 2318          Full Code    Dolores Moore DO  Neurocritical Care  [facility] - Neuro Critical Care

## 2024-10-25 NOTE — SUBJECTIVE & OBJECTIVE
Interval History: 10/25/2024: Patient remains on EEG with right epileptogenic activity and severe diffuse encephalopathy. Subgaleal drain output 20cc and subdural drain output 10cc.    Medications:  Continuous Infusions:   D10W   Intravenous Continuous PRN        insulin regular 1 units/mL infusion orderable (DKA)  0-52 Units/hr Intravenous Continuous 1.5 mL/hr at 10/25/24 0623 1.5 Units/hr at 10/25/24 0623    propofol  50 mcg/kg/min Intravenous Continuous 25.1 mL/hr at 10/25/24 1200 50 mcg/kg/min at 10/25/24 1200     Scheduled Meds:   atorvastatin  40 mg Per OG tube Daily    cloBAZam  20 mg Per OG tube Q12H    famotidine  20 mg Per OG tube Daily    heparin (porcine)  5,000 Units Subcutaneous Q8H    insulin aspart U-100  6 Units Subcutaneous 6 times per day    insulin glargine U-100  25 Units Subcutaneous Daily    lacosamide (Vimpat) IV orderable  200 mg Intravenous Q12H    levETIRAcetam (Keppra) IV (PEDS and ADULTS)  1,000 mg Intravenous Q12H    silodosin  4 mg Per OG tube Daily     PRN Meds:  Current Facility-Administered Medications:     acetaminophen, 650 mg, Per OG tube, Q6H PRN    D10W, , Intravenous, Continuous PRN    dextrose 10%, 12.5 g, Intravenous, PRN    dextrose 10%, 25 g, Intravenous, PRN    glucagon (human recombinant), 1 mg, Intramuscular, PRN    hydrALAZINE, 10 mg, Intravenous, Q4H PRN    insulin aspart U-100, 0-10 Units, Subcutaneous, Q4H PRN    labetalol, 10 mg, Intravenous, Q4H PRN    magnesium oxide, 800 mg, Per OG tube, PRN    magnesium oxide, 800 mg, Per OG tube, PRN    ondansetron, 4 mg, Intravenous, Q8H PRN    potassium bicarbonate, 35 mEq, Per OG tube, PRN    potassium bicarbonate, 50 mEq, Per OG tube, PRN    potassium bicarbonate, 60 mEq, Per OG tube, PRN    potassium, sodium phosphates, 2 packet, Per OG tube, PRN    potassium, sodium phosphates, 2 packet, Per OG tube, PRN    potassium, sodium phosphates, 2 packet, Per OG tube, PRN     Review of Systems  Objective:     Weight: 83.5 kg (184  lb)  Body mass index is 27.98 kg/m².  Vital Signs (Most Recent):  Temp: 100.1 °F (37.8 °C) (10/25/24 0827)  Pulse: 64 (10/25/24 1200)  Resp: 12 (10/25/24 1200)  BP: 125/60 (10/25/24 1200)  SpO2: 100 % (10/25/24 1200) Vital Signs (24h Range):  Temp:  [98.5 °F (36.9 °C)-100.1 °F (37.8 °C)] 100.1 °F (37.8 °C)  Pulse:  [64-84] 64  Resp:  [12-24] 12  SpO2:  [95 %-100 %] 100 %  BP: (113-146)/(56-74) 125/60  Arterial Line BP: (124-172)/(50-80) 137/57     Date 10/25/24 0700 - 10/26/24 0659   Shift 6484-0505 8746-1587 2947-4070 24 Hour Total   INTAKE   I.V.(mL/kg) 299.1(3.6)   299.1(3.6)   NG/   340   Shift Total(mL/kg) 639.1(7.7)   639.1(7.7)   OUTPUT   Shift Total(mL/kg)       Weight (kg) 83.5 83.5 83.5 83.5              Vent Mode: A/C  Oxygen Concentration (%):  [40] 40  Resp Rate Total:  [18 br/min-25 br/min] 19 br/min  Vt Set:  [500 mL] 500 mL  PEEP/CPAP:  [5 cmH20] 5 cmH20  Mean Airway Pressure:  [7.1 etR75-40 cmH20] 8.3 cmH20             NG/OG Tube 10/23/24 2100 14 Fr. Center mouth (Active)   Placement Check placement verified by aspirate characteristics 10/25/24 1100   Tolerance no signs/symptoms of discomfort 10/25/24 1100   Securement secured to commercial device 10/25/24 1100   Clamp Status/Tolerance unclamped;no abdominal discomfort 10/25/24 1100   Suction Setting/Drainage Method suction at the bedside 10/25/24 1100   Insertion Site Appearance no redness, warmth, tenderness, skin breakdown, drainage 10/25/24 1100   Drainage None 10/25/24 1100   Flush/Irrigation flushed w/;water 10/25/24 1100   Feeding Type continuous;by pump 10/25/24 1100   Feeding Action feeding continued 10/25/24 1100   Current Rate (mL/hr) 50 mL/hr 10/25/24 1100   Goal Rate (mL/hr) 50 mL/hr 10/25/24 1100   Intake (mL) 90 mL 10/25/24 0802   Water Bolus (mL) 400 mL 10/25/24 0000   Formula Name Diabetic Isosource 10/25/24 0902   Intake (mL) - Formula Tube Feeding 50 10/25/24 1100            Rectal Tube 10/22/24 1718 rectal tube w/ balloon  "(indicate number of mLs) (Active)   Reposition drainage bags for BMS & Longoria on opposite sides of bed 10/25/24 1100   Outcome gas expelled, stool evacuated 10/25/24 1100   Stool Color Brown 10/25/24 1100   Insertion Site Appearance no redness, warmth, tenderness, skin breakdown, drainage 10/25/24 1100   Flush/Irrigation flushed w/;water;no resistance met 10/25/24 0902   Intake (mL) 40 mL 10/23/24 2102   Rectal Tube Output 300 mL 10/23/24 0602     Neurosurgery Physical Exam     [id number]  On EEG  PERRL  +c/g, no gag  TF LLE    Significant Labs:  Recent Labs   Lab 10/24/24  0020 10/24/24  0843 10/25/24  0014   *  --  165*   * 148* 146*   K 4.3  --  4.8   *  --  115*   CO2 24  --  22*   BUN 43*  --  39*   CREATININE 1.3  --  1.3   CALCIUM 7.9*  --  7.9*   MG 3.2*  --  3.1*     Recent Labs   Lab 10/24/24  0020 10/25/24  0014 10/25/24  0418   WBC 6.26 6.89  --    HGB 7.7* 7.6*  --    HCT 25.6* 24.4* 20*   * 143*  --      No results for input(s): "LABPT", "INR", "APTT" in the last 48 hours.  Microbiology Results (last 7 days)       Procedure Component Value Units Date/Time    Blood culture [[id number]] Collected: 10/18/24 0335    Order Status: Completed Specimen: Blood from Peripheral, Foot, Right Updated: 10/23/24 0612     Blood Culture, Routine No growth after 5 days.    Blood culture [[id number]] Collected: 10/18/24 0345    Order Status: Completed Specimen: Blood from Peripheral, Hand, Left Updated: 10/23/24 0612     Blood Culture, Routine No growth after 5 days.          All pertinent labs from the last 24 hours have been reviewed.    Significant Diagnostics:  I have reviewed all pertinent imaging results/findings within the past 24 hours.  "

## 2024-10-25 NOTE — PROCEDURES
ICU EEG/VIDEO MONITORING REPORT    DATE OF SERVICE: 10/24/24-10/25/24  EEG NUMBER: FH -1  [facility]    METHODOLOGY   Electroencephalographic (EEG) recording is with electrodes placed according to the International 10-20 placement system.  Thirty two (32) channels of digital signal are simultaneously recorded from the scalp and may include EKG, EMG, and/or eye monitors.   Recording band pass was 0.1 to 512 hz.  Digital video recording of the patient is simultaneously recorded with the EEG.  The nursing staff report clinical symptoms and may press an event button when the patient has symptoms of clinical interest to the treating physicians.  EEG and video recording is stored and archived in digital format.  The entire recording is visually reviewed and the times identified by computer analysis as being spikes or seizures are reviewed again.  Activation procedures which include photic stimulation, hyperventilation and instructing patients to perform simple task are done in selected patients.   Compresses spectral analysis (CSA) is also performed on the activity recorded from each individual channel.  This is displayed as a power display of frequencies from 0 to 30 Hz over time.   The CSA analysis is done and displayed continuously.  This is reviewed for asymmetries in power between homologous areas of the scalp and for presence of changes in power which canbe seen when seizures occur.  Sections of suspected abnormalities on the CSA is then compared with the original EEG recording.     [employer] software was also utilized in the review of this study.  This software suite analyzes the EEG recording in multiple domains.  Coherence and rhythmicity is computed to identify EEG sections which may contain organized seizures.  Each channel undergoes analysis to detect presence of spike and sharp waves which have special and morphological characteristic of epileptic activity.  The routine EEG recording  is converted from spacial into frequency domain.  This is then displayed comparing homologous areas to identify areas of significant asymmetry.  Algorithm to identify non-cortically generated artifact is used to separate eye movement, EMG and other artifact from the EEG.      Recording Times  Start on 10/24/24 at 07:00  Stop on 10/25/24 at 07:00  24 hours recorded  Start 10/25/24 at 07:00  Stop 10'25/24 at 09:21 - 2 hours 21 minutes    This EEG study is performed in the ICU with the patient on the following sedative medications: propofol 40     Indication: 83 y.o. 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     State of Consciousness:   Coma    Background:   The background is discontinuous  and asymmetric.  The background is burst suppressed with periods of suppression lasting up to 8 seconds.  The record is sometimes dyssynchronous with activity seen over the left and right hemisphere at different times.  Activity over the right hemisphere consists of low amplitude theta/delta activity, at times with rhythmic sharply contoured slowing in the right parasagittal chain.  Bursts of activity over the left hemisphere consists of low amplitude theta/delta slowing.    Sleep:   No sleep architecture is appreciated during the record    Epileptiform Abnormalities  Intermittent sharps seen maximum P4    Seizures/Events:   One subclinical seizure is noted at 19:14 characterized electrographically by repetitive spike wave complexes P4.  Seizures lasts 1 minute 40 seconds and remains limited to the right hemisphere.     Impression:   Abnormal study consistent with a highly epileptogenic right hemispheric structural lesion and a severe diffuse encephalopathy.  One subclinical seizure is recorded at 19:14 on 10/24/24.        Mayra Trammell MD  [facility]   Department of Neurology/Epilepsy

## 2024-10-25 NOTE — PLAN OF CARE
"[facility] Care Plan    POC reviewed with Percy Ramirez and family at 0300. Family verbalized understanding. Questions and concerns addressed. No acute events today. Pt progressing toward goals. Will continue to monitor. See below and flowsheets for full assessment and VS info.     - NAEON  - bath complete, linens changed       Is this a stroke patient? yes- Stroke booklet reviewed with family, risk factors identified for patient and stroke booklet remains at bedside for ongoing education.     Care individualization: Care clustered     Neuro:  [place] Coma Scale  Best Eye Response: 1-->(E1) none  Best Motor Response: 3-->(M3) flexion to pain  Best Verbal Response: 1-->(V1) none  [place] Coma Scale Score: 5  Assessment Qualifiers: patient intubated  Pupil PERRLA: yes     24 hr Temp:  [97.4 °F (36.3 °C)-98.9 °F (37.2 °C)]     CV:   Rhythm: atrial rhythm  BP goals:   SBP < 140  MAP > 65    Resp:      Vent Mode: A/C  Set Rate: 16 BPM  Oxygen Concentration (%): 40  Vt Set: 500 mL  PEEP/CPAP: 5 cmH20  Pressure Support: 5 cmH20    Plan: wean to extubate    GI/:     Diet/Nutrition Received: tube feeding  Last Bowel Movement: 10/24/24  Voiding Characteristics: external catheter    Intake/Output Summary (Last 24 hours) at 10/25/2024 0636  Last data filed at 10/25/2024 0305  Gross per 24 hour   Intake 2545.25 ml   Output 660 ml   Net 1885.25 ml     Unmeasured Output  Urine Occurrence: 1  Stool Occurrence: 1  Pad Count: 2    Labs/Accuchecks:  Recent Labs   Lab 10/25/24  0014 10/25/24  0418   WBC 6.89  --    RBC 2.68*  --    HGB 7.6*  --    HCT 24.4* 20*   *  --       Recent Labs   Lab 10/25/24  0014   *   K 4.8   CO2 22*   *   BUN 39*   CREATININE 1.3   ALKPHOS 69   ALT 8*   AST 26   BILITOT 0.3      Recent Labs   Lab 10/19/24  0158   INR 1.1   APTT 25.0    No results for input(s): "CPK", "CPKMB", "TROPONINI", "MB" in the last 168 hours.    Electrolytes: N/A - electrolytes WDL  Accuchecks: Q1H    Gtts:   " insulin regular 1 units/mL infusion orderable (DKA)  0-52 Units/hr Intravenous Continuous 1.5 mL/hr at 10/25/24 0623 1.5 Units/hr at 10/25/24 0623    propofol  50 mcg/kg/min Intravenous Continuous 25.1 mL/hr at 10/25/24 0000 50 mcg/kg/min at 10/25/24 0000       LDA/Wounds:    Mykel Risk Assessment  Sensory Perception: 2-->very limited  Moisture: 4-->rarely moist  Activity: 3-->walks occasionally  Mobility: 1-->completely immobile  Nutrition: 4-->excellent  Friction and Shear: 2-->potential problem  Mykel Score: 16  Is your mykel score 12 or less? no    Nurses Note -- 4 Eyes    Is there altered skin present?  yes     Please check the following boxes that apply:   [] LDA Added if Not in Epic (Describe Wound)   [x] New Altered Skin Integrity was Present on Admit and Documented in LDA   [] Wound Image Taken    Wound Care Consulted? YES     Second RN/Staff Member:  RADHA Ortiz      Restraints:   Restraint Order  Length of Order: Order good for next 24 hours or when removed.  Date that the current order will : 10/24/24  Time that the current order will : 1503  Order Upon Application: Yes    [facility]

## 2024-10-25 NOTE — SUBJECTIVE & OBJECTIVE
Interval History: One subclinical seizure noted on EEG overnight. EEG removed for MRI and to remove drains, plan to rehook this afternoon/tomorrow am. Escalate AED regimen prior to Propofol wean.     Current Facility-Administered Medications   Medication Dose Route Frequency Provider Last Rate Last Admin    acetaminophen tablet 650 mg  650 mg Per OG tube Q6H PRN Saman Gonzalez MD   650 mg at 10/25/24 0827    atorvastatin tablet 40 mg  40 mg Per OG tube Daily Christina Em NP   40 mg at 10/25/24 0828    cloBAZam Tab 20 mg  20 mg Per OG tube Q12H Dolores Moore DO        dextrose 10 % infusion   Intravenous Continuous PRN Dolores Moore DO        dextrose 10% bolus 125 mL 125 mL  12.5 g Intravenous PRN Dolores Moore,         dextrose 10% bolus 250 mL 250 mL  25 g Intravenous PRN Dolores Moore, DO        famotidine tablet 20 mg  20 mg Per OG tube Daily Saman Gonzalez MD   20 mg at 10/25/24 0828    glucagon (human recombinant) injection 1 mg  1 mg Intramuscular PRN Dolores Moore DO        heparin (porcine) injection 5,000 Units  5,000 Units Subcutaneous Q8H Khris Cote MD   5,000 Units at 10/25/24 0512    hydrALAZINE injection 10 mg  10 mg Intravenous Q4H PRN Radha Yan PA-C        insulin aspart U-100 pen 0-10 Units  0-10 Units Subcutaneous Q4H PRN Dolores Moore DO        insulin aspart U-100 pen 6 Units  6 Units Subcutaneous 6 times per day Dolores Moore DO   6 Units at 10/25/24 1243    insulin glargine U-100 (Lantus) pen 25 Units  25 Units Subcutaneous Daily Dolores Moore DO   25 Units at 10/25/24 1133    insulin regular in 0.9 % NaCl 100 unit/100 mL (1 unit/mL) infusion  0-52 Units/hr Intravenous Continuous Dolores Moore DO 1.5 mL/hr at 10/25/24 0623 1.5 Units/hr at 10/25/24 0623    labetalol 20 mg/4 mL (5 mg/mL) IV syring  10 mg Intravenous Q4H PRN aRdha Yan PA-C   10 mg at 10/24/24 1936    lacosamide injection 200 mg  200 mg Intravenous Q12H Dolores Moore DO   200 mg at  10/25/24 1158    levETIRAcetam injection 1,000 mg  1,000 mg Intravenous Q12H Nam Reynoso MD   1,000 mg at 10/25/24 0828    magnesium oxide split tablet 800 mg  800 mg Per OG tube PRN Radha Yan, PA-WHIT        magnesium oxide split tablet 800 mg  800 mg Per OG tube PRN Radha Yan, PA-WHIT        ondansetron injection 4 mg  4 mg Intravenous Q8H PRN Angel Nunez PA-C        potassium bicarbonate disintegrating tablet 35 mEq  35 mEq Per OG tube PRN Radha Yan, PA-WHIT        potassium bicarbonate disintegrating tablet 50 mEq  50 mEq Per OG tube PRN Radha Yan, PA-WHIT        potassium bicarbonate disintegrating tablet 60 mEq  60 mEq Per OG tube PRN Radha Yan, TIRSO        potassium, sodium phosphates 280-160-250 mg packet 2 packet  2 packet Per OG tube PRN Radha Yan, PA-WHIT        potassium, sodium phosphates 280-160-250 mg packet 2 packet  2 packet Per OG tube PRN Radha Yan, PA-C   2 packet at 10/23/24 1220    potassium, sodium phosphates 280-160-250 mg packet 2 packet  2 packet Per OG tube PRN Radha Yan PA-C        propofol (DIPRIVAN) 10 mg/mL infusion (NON-TITRATING)  50 mcg/kg/min Intravenous Continuous Dolores Moore DO 25.1 mL/hr at 10/25/24 1200 50 mcg/kg/min at 10/25/24 1200    silodosin capsule 4 mg  4 mg Per OG tube Daily Radha Yan, TIRSO   4 mg at 10/25/24 0828     Continuous Infusions:   D10W   Intravenous Continuous PRN        insulin regular 1 units/mL infusion orderable (DKA)  0-52 Units/hr Intravenous Continuous 1.5 mL/hr at 10/25/24 0623 1.5 Units/hr at 10/25/24 0623    propofol  50 mcg/kg/min Intravenous Continuous 25.1 mL/hr at 10/25/24 1200 50 mcg/kg/min at 10/25/24 1200       Review of Systems   Unable to perform ROS: Intubated     Objective:     Vital Signs (Most Recent):  Temp: 100.1 °F (37.8 °C) (10/25/24 0827)  Pulse: 64 (10/25/24 1200)  Resp: 12 (10/25/24 1200)  BP: 125/60 (10/25/24 1200)  SpO2: 100 % (10/25/24 1200) Vital Signs (24h  Range):  Temp:  [98.5 °F (36.9 °C)-100.1 °F (37.8 °C)] 100.1 °F (37.8 °C)  Pulse:  [64-84] 64  Resp:  [12-24] 12  SpO2:  [95 %-100 %] 100 %  BP: (113-146)/(56-74) 125/60  Arterial Line BP: (124-172)/(50-80) 137/57     Weight: 83.5 kg (184 lb)  Body mass index is 27.98 kg/m².     Physical Exam  Vitals and nursing note reviewed.   Constitutional:       Appearance: He is ill-appearing. He is not diaphoretic.   HENT:      Head:      Comments: EEG in place  R periorbital ecchymosis  Eyes:      Pupils: Pupils are equal, round, and reactive to light.   Pulmonary:      Effort: Pulmonary effort is normal. No respiratory distress.   Musculoskeletal:      Cervical back: Neck supple.   Skin:     General: Skin is warm and dry.   Psychiatric:      Comments: Unable to assess            NEUROLOGICAL EXAMINATION:     CRANIAL NERVES     CN III, IV, VI   Pupils are equal, round, and reactive to light.       Sedated on Propofol  CN:   AQUILINO OU   No spontaneous eye opening   No withdrawal to noxious stimuli BLE    Exam findings to suggest seizures:  Myoclonus - no   eye twitching - no   Nystagmus - no   gaze deviation - no   waxy rigidity - no         Significant Labs: All pertinent lab results from the past 24 hours have been reviewed.    Significant Studies: I have reviewed all pertinent imaging results/findings within the past 24 hours.

## 2024-10-25 NOTE — ASSESSMENT & PLAN NOTE
Initial presentation c/f HHS w glucose > 600. Beta hydroxybutyrate and urine ketones negative. Serum CO2 22. Given SQ insulin at OSH.  On arrival to [facility], . Hb A1c 9.9% on admit.     Tube feeds started, will titrate to goal.  Insulin gtt transitioned to subcu insulin.   Serum osmolality elevated 312.    Patient answered NO to all of the above 3 questions.

## 2024-10-25 NOTE — ASSESSMENT & PLAN NOTE
EEG placed morning of 10/21 due to delay in return to expected LOC  -Multiple seizures throughout the day  -Has been loaded with keppra and vimpat  -Receiving maintenance keppra and vimpat, on prop drip at 50 with good response  -Addl seizure evening of 10/24, Onfi added on. Will wean propofol tomorrow

## 2024-10-25 NOTE — PROGRESS NOTES
[facility] - Neuro Critical Care  Neurosurgery  Progress Note    Subjective:     History of Present Illness: Percy Ramirez is a 83 y.o. 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 Pt initially presented to  ED 2 days ago after mechanical fall and was found to have small SDH. Repeat scan was stable and patient was discharged home with outpatient follow up and instructions to hold his blood thinners. He re-presented to  ED yesterday after suffering another unwitnessed fall and family noticing him becoming more lethargic with c/o neck and head pain. Blood glucose >600 at OSH. CTH was indicative of 5cm right frontal IPC with 7mm midline shift. K-centra was given at OSH and pt was transferred to [facility] for higher level of care. Upon arrival pt INR was 1.2. Pt has waxing and waning exam, at times oriented and following commands and at other times lethargic, disoriented, and not responding. Further history limited due to patient's mental status change.    Post-Op Info:  Procedure(s) (LRB):  CRANIOTOMY, FOR SUBDURAL HEMATOMA EVACUATION (Right)   7 Days Post-Op   Interval History: 10/25/2024: Patient remains on EEG with right epileptogenic activity and severe diffuse encephalopathy. Subgaleal drain output 20cc and subdural drain output 10cc.    Medications:  Continuous Infusions:   D10W   Intravenous Continuous PRN        insulin regular 1 units/mL infusion orderable (DKA)  0-52 Units/hr Intravenous Continuous 1.5 mL/hr at 10/25/24 0623 1.5 Units/hr at 10/25/24 0623    propofol  50 mcg/kg/min Intravenous Continuous 25.1 mL/hr at 10/25/24 1200 50 mcg/kg/min at 10/25/24 1200     Scheduled Meds:   atorvastatin  40 mg Per OG tube Daily    cloBAZam  20 mg Per OG tube Q12H    famotidine  20 mg Per OG tube Daily    heparin (porcine)  5,000 Units Subcutaneous Q8H    insulin aspart U-100  6 Units Subcutaneous 6 times per  day    insulin glargine U-100  25 Units Subcutaneous Daily    lacosamide (Vimpat) IV orderable  200 mg Intravenous Q12H    levETIRAcetam (Keppra) IV (PEDS and ADULTS)  1,000 mg Intravenous Q12H    silodosin  4 mg Per OG tube Daily     PRN Meds:  Current Facility-Administered Medications:     acetaminophen, 650 mg, Per OG tube, Q6H PRN    D10W, , Intravenous, Continuous PRN    dextrose 10%, 12.5 g, Intravenous, PRN    dextrose 10%, 25 g, Intravenous, PRN    glucagon (human recombinant), 1 mg, Intramuscular, PRN    hydrALAZINE, 10 mg, Intravenous, Q4H PRN    insulin aspart U-100, 0-10 Units, Subcutaneous, Q4H PRN    labetalol, 10 mg, Intravenous, Q4H PRN    magnesium oxide, 800 mg, Per OG tube, PRN    magnesium oxide, 800 mg, Per OG tube, PRN    ondansetron, 4 mg, Intravenous, Q8H PRN    potassium bicarbonate, 35 mEq, Per OG tube, PRN    potassium bicarbonate, 50 mEq, Per OG tube, PRN    potassium bicarbonate, 60 mEq, Per OG tube, PRN    potassium, sodium phosphates, 2 packet, Per OG tube, PRN    potassium, sodium phosphates, 2 packet, Per OG tube, PRN    potassium, sodium phosphates, 2 packet, Per OG tube, PRN     Review of Systems  Objective:     Weight: 83.5 kg (184 lb)  Body mass index is 27.98 kg/m².  Vital Signs (Most Recent):  Temp: 100.1 °F (37.8 °C) (10/25/24 0827)  Pulse: 64 (10/25/24 1200)  Resp: 12 (10/25/24 1200)  BP: 125/60 (10/25/24 1200)  SpO2: 100 % (10/25/24 1200) Vital Signs (24h Range):  Temp:  [98.5 °F (36.9 °C)-100.1 °F (37.8 °C)] 100.1 °F (37.8 °C)  Pulse:  [64-84] 64  Resp:  [12-24] 12  SpO2:  [95 %-100 %] 100 %  BP: (113-146)/(56-74) 125/60  Arterial Line BP: (124-172)/(50-80) 137/57     Date 10/25/24 0700 - 10/26/24 0659   Shift 8095-7401 5441-3778 9445-0190 24 Hour Total   INTAKE   I.V.(mL/kg) 299.1(3.6)   299.1(3.6)   NG/   340   Shift Total(mL/kg) 639.1(7.7)   639.1(7.7)   OUTPUT   Shift Total(mL/kg)       Weight (kg) 83.5 83.5 83.5 83.5              Vent Mode: A/C  Oxygen  Concentration (%):  [40] 40  Resp Rate Total:  [18 br/min-25 br/min] 19 br/min  Vt Set:  [500 mL] 500 mL  PEEP/CPAP:  [5 cmH20] 5 cmH20  Mean Airway Pressure:  [7.1 edI15-12 cmH20] 8.3 cmH20             NG/OG Tube 10/23/24 2100 14 Fr. Center mouth (Active)   Placement Check placement verified by aspirate characteristics 10/25/24 1100   Tolerance no signs/symptoms of discomfort 10/25/24 1100   Securement secured to commercial device 10/25/24 1100   Clamp Status/Tolerance unclamped;no abdominal discomfort 10/25/24 1100   Suction Setting/Drainage Method suction at the bedside 10/25/24 1100   Insertion Site Appearance no redness, warmth, tenderness, skin breakdown, drainage 10/25/24 1100   Drainage None 10/25/24 1100   Flush/Irrigation flushed w/;water 10/25/24 1100   Feeding Type continuous;by pump 10/25/24 1100   Feeding Action feeding continued 10/25/24 1100   Current Rate (mL/hr) 50 mL/hr 10/25/24 1100   Goal Rate (mL/hr) 50 mL/hr 10/25/24 1100   Intake (mL) 90 mL 10/25/24 0802   Water Bolus (mL) 400 mL 10/25/24 0000   Formula Name Diabetic Isosource 10/25/24 0902   Intake (mL) - Formula Tube Feeding 50 10/25/24 1100            Rectal Tube 10/22/24 1718 rectal tube w/ balloon (indicate number of mLs) (Active)   Reposition drainage bags for BMS & Longoria on opposite sides of bed 10/25/24 1100   Outcome gas expelled, stool evacuated 10/25/24 1100   Stool Color Brown 10/25/24 1100   Insertion Site Appearance no redness, warmth, tenderness, skin breakdown, drainage 10/25/24 1100   Flush/Irrigation flushed w/;water;no resistance met 10/25/24 0902   Intake (mL) 40 mL 10/23/24 2102   Rectal Tube Output 300 mL 10/23/24 0602     Neurosurgery Physical Exam     [id number]  On EEG  PERRL  +c/g, no gag  TF LLE    Significant Labs:  Recent Labs   Lab 10/24/24  0020 10/24/24  0843 10/25/24  0014   *  --  165*   * 148* 146*   K 4.3  --  4.8   *  --  115*   CO2 24  --  22*   BUN 43*  --  39*   CREATININE 1.3  --  1.3  "  CALCIUM 7.9*  --  7.9*   MG 3.2*  --  3.1*     Recent Labs   Lab 10/24/24  0020 10/25/24  0014 10/25/24  0418   WBC 6.26 6.89  --    HGB 7.7* 7.6*  --    HCT 25.6* 24.4* 20*   * 143*  --      No results for input(s): "LABPT", "INR", "APTT" in the last 48 hours.  Microbiology Results (last 7 days)       Procedure Component Value Units Date/Time    Blood culture [[id number]] Collected: 10/18/24 0335    Order Status: Completed Specimen: Blood from Peripheral, Foot, Right Updated: 10/23/24 0612     Blood Culture, Routine No growth after 5 days.    Blood culture [[id number]] Collected: 10/18/24 0345    Order Status: Completed Specimen: Blood from Peripheral, Hand, Left Updated: 10/23/24 0612     Blood Culture, Routine No growth after 5 days.          All pertinent labs from the last 24 hours have been reviewed.    Significant Diagnostics:  I have reviewed all pertinent imaging results/findings within the past 24 hours.  Assessment/Plan:     * Traumatic right-sided intracerebral hemorrhage without loss of consciousness  84 y.o. 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 Given K-centra at OSH.     Taken to OR 10/18 for R frontal IPH evac via eyebrow supraorbital craniotomy. Unfortunately, interval development of large SDH on post op scan, taken back to OR emergently for acute SDH evacuation.    Imaging:  -CTH OSH 10/17: 5cm R frontal ICH with 7mm MLS and some intraventricular blood in posterior lateral vent   -CT Csp OSH 10/17: no acute processes   -rCTH 10/18: grossly stable   -CTA 10/18: post op clot evac with new holoconvexity R SDH, 12 mm MLS   -Post op CTH 10/18: s/p SDH evacuation with appropriate evac, CTA neg   -CTH 10/21: new/increased hyperdensity in the resection cavity without worsening mass effect or midline shift   -rCTH 10/21: stable    S/p R SDH evac on 10/18    Plan:  Patient admitted to ICU on telemetry, q1h neuro checks  Hold " anti-plt/coag medications. Given K-centra for reversal at OSH. INR 1.2 on arrival  MRI brain w wo ordered to look for amyloid, still pending  SBP <140   Na >135  Keppra 1g q12, vimpat and prop added - f/u cEEG, management per NCC  LEANDRO management per NCC  Transfuse RBC for goal Hgb >7  No HOB restrictions  Drains removed with stitch today  WTE plan per NCC  Continue to monitor clinically, notify NSGY immediately with any changes in neuro status    Plan discussed with Dr. Condon    Dispo: admitted to ICU            Haider Goel MD  Neurosurgery  [facility] - [facility]

## 2024-10-25 NOTE — SUBJECTIVE & OBJECTIVE
Interval History:      Additional seizure overnight.  Onfi started this morning.  Eeg temporarily discontinued with the patient's drains could be removed and he could go for MRI.  Plan to wean propofol tomorrow.  Insulin drip transitioned to subcu insulin.    Objective:     Vitals:  Temp: 100.1 °F (37.8 °C)  Pulse: 64  Rhythm: normal sinus rhythm  BP: 125/60  MAP (mmHg): 87  CI (L/min/m2): 2.9 L/min/m2  SVI: 34  SVV: 13 %  Resp: 12  SpO2: 100 %  Oxygen Concentration (%): 40  Vent Mode: A/C  Set Rate: 16 BPM  Vt Set: 500 mL  PEEP/CPAP: 5 cmH20  Peak Airway Pressure: 16 cmH20  Mean Airway Pressure: 8.3 cmH20  Plateau Pressure: 13 cmH20    Temp  Min: 98.5 °F (36.9 °C)  Max: 100.1 °F (37.8 °C)  Pulse  Min: 64  Max: 84  BP  Min: 113/57  Max: 146/70  MAP (mmHg)  Min: 81  Max: 101  CI (L/min/m2)  Min: 2.3 L/min/m2  Max: 2.9 L/min/m2  SVI  Min: 29  Max: 34  SVV  Min: 10 %  Max: 13 %  Resp  Min: 12  Max: 24  SpO2  Min: 95 %  Max: 100 %  Oxygen Concentration (%)  Min: 40  Max: 40    10/24 0701 - 10/25 0700  In: 2545.3 [I.V.:430.3]  Out: 660 [Urine:600; Drains:60]   Unmeasured Output  Urine Occurrence: 1  Stool Occurrence: 1  Pad Count: 2        Physical Exam  Constitutional:       Interventions: He is sedated and intubated.   Cardiovascular:      Rate and Rhythm: Normal rate and regular rhythm.   Pulmonary:      Effort: Pulmonary effort is normal. No respiratory distress. He is intubated.      Breath sounds: Normal breath sounds.   Abdominal:      General: There is no distension.   Skin:     General: Skin is warm and dry.   Neurological:      Comments: Unable to assess secondary to sedation              Medications:  [id number]  insulin regular 1 units/mL infusion orderable (DKA), Last Rate: 1.5 Units/hr (10/25/24 0623)  propofol, Last Rate: 50 mcg/kg/min (10/25/24 1200)    Scheduledatorvastatin, 40 mg, Daily  cloBAZam, 20 mg, Q12H  famotidine, 20 mg, Daily  heparin (porcine), 5,000 Units, Q8H  insulin aspart U-100, 6  Units, 6 times per day  insulin glargine U-100, 25 Units, Daily  lacosamide (Vimpat) IV orderable, 200 mg, Q12H  levETIRAcetam (Keppra) IV (PEDS and ADULTS), 1,000 mg, Q12H  silodosin, 4 mg, Daily    PRNacetaminophen, 650 mg, Q6H PRN  D10W, , Continuous PRN  dextrose 10%, 12.5 g, PRN  dextrose 10%, 25 g, PRN  glucagon (human recombinant), 1 mg, PRN  hydrALAZINE, 10 mg, Q4H PRN  insulin aspart U-100, 0-10 Units, Q4H PRN  labetalol, 10 mg, Q4H PRN  magnesium oxide, 800 mg, PRN  magnesium oxide, 800 mg, PRN  ondansetron, 4 mg, Q8H PRN  potassium bicarbonate, 35 mEq, PRN  potassium bicarbonate, 50 mEq, PRN  potassium bicarbonate, 60 mEq, PRN  potassium, sodium phosphates, 2 packet, PRN  potassium, sodium phosphates, 2 packet, PRN  potassium, sodium phosphates, 2 packet, PRN      Today I personally reviewed pertinent medications, laboratory results, notably:    Diet  Diet NPO Except for: Medication  Diet NPO Except for: Medication

## 2024-10-25 NOTE — ASSESSMENT & PLAN NOTE
- Glucose >600 on ER presentation  - Management per NCC, transitioned to scheduled insulin regimen

## 2024-10-25 NOTE — ASSESSMENT & PLAN NOTE
83M PMHx of HTN, HLD, DM2, CAD s/p CABG x2, Afib on Coumadin, ILD on 4L of O2, HFrEF (45%), presenting as a transfer for a large traumatic R frontal IPH reversed with Kcentra and vitamin K with repeat INR 1.2.    Admit to NCC  q1h neuro checks, vitals, and I&O's  CBC, CMP, mag, and phos daily   Coags, TSH, A1c, lipid panel, UA  Echo, EKG, CXR  SBP <140  Na goals > 145.  PRN boluses of 3% HTS if sodium less than 145  Neurosurgery consulted   NPO   SCDs; heparin for VTE prophylaxis  PT/OT/SLP    MRI today, 10/25  Drains removed today

## 2024-10-26 LAB
ALBUMIN SERPL BCP-MCNC: 1.8 G/DL (ref 3.5–5.2)
ALLENS TEST: NORMAL
ALP SERPL-CCNC: 74 U/L (ref 40–150)
ALT SERPL W/O P-5'-P-CCNC: <5 U/L (ref 10–44)
ANION GAP SERPL CALC-SCNC: 7 MMOL/L (ref 8–16)
ANISOCYTOSIS BLD QL SMEAR: SLIGHT
AST SERPL-CCNC: 30 U/L (ref 10–40)
BASO STIPL BLD QL SMEAR: ABNORMAL
BASOPHILS # BLD AUTO: ABNORMAL K/UL (ref 0–0.2)
BASOPHILS NFR BLD: 0 % (ref 0–1.9)
BILIRUB SERPL-MCNC: 0.3 MG/DL (ref 0.1–1)
BUN SERPL-MCNC: 41 MG/DL (ref 8–23)
CALCIUM SERPL-MCNC: 8 MG/DL (ref 8.7–10.5)
CHLORIDE SERPL-SCNC: 111 MMOL/L (ref 95–110)
CO2 SERPL-SCNC: 22 MMOL/L (ref 23–29)
CREAT SERPL-MCNC: 1.2 MG/DL (ref 0.5–1.4)
DELSYS: NORMAL
DIFFERENTIAL METHOD BLD: ABNORMAL
EOSINOPHIL # BLD AUTO: ABNORMAL K/UL (ref 0–0.5)
EOSINOPHIL NFR BLD: 2 % (ref 0–8)
ERYTHROCYTE [DISTWIDTH] IN BLOOD BY AUTOMATED COUNT: 17.1 % (ref 11.5–14.5)
ERYTHROCYTE [SEDIMENTATION RATE] IN BLOOD BY WESTERGREN METHOD: 16 MM/H
EST. GFR  (NO RACE VARIABLE): >60 ML/MIN/1.73 M^2
FIO2: 40
GLUCOSE SERPL-MCNC: 208 MG/DL (ref 70–110)
HCO3 UR-SCNC: 25.6 MMOL/L (ref 24–28)
HCT VFR BLD AUTO: 23.9 % (ref 40–54)
HGB BLD-MCNC: 7.2 G/DL (ref 14–18)
IMM GRANULOCYTES # BLD AUTO: ABNORMAL K/UL (ref 0–0.04)
IMM GRANULOCYTES NFR BLD AUTO: ABNORMAL % (ref 0–0.5)
LYMPHOCYTES # BLD AUTO: ABNORMAL K/UL (ref 1–4.8)
LYMPHOCYTES NFR BLD: 3 % (ref 18–48)
MAGNESIUM SERPL-MCNC: 3.3 MG/DL (ref 1.6–2.6)
MCH RBC QN AUTO: 28 PG (ref 27–31)
MCHC RBC AUTO-ENTMCNC: 30.1 G/DL (ref 32–36)
MCV RBC AUTO: 93 FL (ref 82–98)
METAMYELOCYTES NFR BLD MANUAL: 1 %
MODE: NORMAL
MONOCYTES # BLD AUTO: ABNORMAL K/UL (ref 0.3–1)
MONOCYTES NFR BLD: 8 % (ref 4–15)
MYELOCYTES NFR BLD MANUAL: 1 %
NEUTROPHILS NFR BLD: 84 % (ref 38–73)
NEUTS BAND NFR BLD MANUAL: 1 %
NRBC BLD-RTO: 1 /100 WBC
OHS QRS DURATION: 124 MS
OHS QTC CALCULATION: 412 MS
PCO2 BLDA: 43.8 MMHG (ref 35–45)
PEEP: 5
PH SMN: 7.38 [PH] (ref 7.35–7.45)
PHOSPHATE SERPL-MCNC: 3.9 MG/DL (ref 2.7–4.5)
PLATELET # BLD AUTO: 140 K/UL (ref 150–450)
PLATELET BLD QL SMEAR: ABNORMAL
PMV BLD AUTO: 11.1 FL (ref 9.2–12.9)
PO2 BLDA: 80 MMHG (ref 80–100)
POC BE: 0 MMOL/L
POC SATURATED O2: 95 % (ref 95–100)
POC TCO2: 27 MMOL/L (ref 23–27)
POCT GLUCOSE: 195 MG/DL (ref 70–110)
POCT GLUCOSE: 202 MG/DL (ref 70–110)
POCT GLUCOSE: 204 MG/DL (ref 70–110)
POCT GLUCOSE: 225 MG/DL (ref 70–110)
POCT GLUCOSE: 247 MG/DL (ref 70–110)
POCT GLUCOSE: 291 MG/DL (ref 70–110)
POLYCHROMASIA BLD QL SMEAR: ABNORMAL
POTASSIUM SERPL-SCNC: 5 MMOL/L (ref 3.5–5.1)
PROT SERPL-MCNC: 5.5 G/DL (ref 6–8.4)
RBC # BLD AUTO: 2.57 M/UL (ref 4.6–6.2)
SAMPLE: NORMAL
SITE: NORMAL
SODIUM SERPL-SCNC: 140 MMOL/L (ref 136–145)
SP02: 98
TRIGL SERPL-MCNC: 255 MG/DL (ref 30–150)
VT: 500
WBC # BLD AUTO: 6.46 K/UL (ref 3.9–12.7)

## 2024-10-26 PROCEDURE — 93005 ELECTROCARDIOGRAM TRACING: CPT

## 2024-10-26 PROCEDURE — 85027 COMPLETE CBC AUTOMATED: CPT

## 2024-10-26 PROCEDURE — 94003 VENT MGMT INPAT SUBQ DAY: CPT

## 2024-10-26 PROCEDURE — 25000242 PHARM REV CODE 250 ALT 637 W/ HCPCS: Performed by: NURSE PRACTITIONER

## 2024-10-26 PROCEDURE — 27200966 HC CLOSED SUCTION SYSTEM

## 2024-10-26 PROCEDURE — 94799 UNLISTED PULMONARY SVC/PX: CPT

## 2024-10-26 PROCEDURE — 99900035 HC TECH TIME PER 15 MIN (STAT)

## 2024-10-26 PROCEDURE — 25000003 PHARM REV CODE 250

## 2024-10-26 PROCEDURE — 63600175 PHARM REV CODE 636 W HCPCS

## 2024-10-26 PROCEDURE — 99900026 HC AIRWAY MAINTENANCE (STAT)

## 2024-10-26 PROCEDURE — 94760 N-INVAS EAR/PLS OXIMETRY 1: CPT | Mod: XB

## 2024-10-26 PROCEDURE — 84478 ASSAY OF TRIGLYCERIDES: CPT | Performed by: PSYCHIATRY & NEUROLOGY

## 2024-10-26 PROCEDURE — 84100 ASSAY OF PHOSPHORUS: CPT

## 2024-10-26 PROCEDURE — 83735 ASSAY OF MAGNESIUM: CPT

## 2024-10-26 PROCEDURE — 80053 COMPREHEN METABOLIC PANEL: CPT

## 2024-10-26 PROCEDURE — 25000003 PHARM REV CODE 250: Performed by: STUDENT IN AN ORGANIZED HEALTH CARE EDUCATION/TRAINING PROGRAM

## 2024-10-26 PROCEDURE — 95714 VEEG EA 12-26 HR UNMNTR: CPT

## 2024-10-26 PROCEDURE — 63600175 PHARM REV CODE 636 W HCPCS: Performed by: PSYCHIATRY & NEUROLOGY

## 2024-10-26 PROCEDURE — 63600175 PHARM REV CODE 636 W HCPCS: Performed by: REGISTERED NURSE

## 2024-10-26 PROCEDURE — 85007 BL SMEAR W/DIFF WBC COUNT: CPT

## 2024-10-26 PROCEDURE — 51701 INSERT BLADDER CATHETER: CPT

## 2024-10-26 PROCEDURE — 82803 BLOOD GASES ANY COMBINATION: CPT

## 2024-10-26 PROCEDURE — 94761 N-INVAS EAR/PLS OXIMETRY MLT: CPT

## 2024-10-26 PROCEDURE — 20000000 HC ICU ROOM

## 2024-10-26 PROCEDURE — C9254 INJECTION, LACOSAMIDE: HCPCS

## 2024-10-26 PROCEDURE — 95700 EEG CONT REC W/VID EEG TECH: CPT

## 2024-10-26 PROCEDURE — 63600175 PHARM REV CODE 636 W HCPCS: Mod: JZ,JG

## 2024-10-26 PROCEDURE — 37799 UNLISTED PX VASCULAR SURGERY: CPT

## 2024-10-26 PROCEDURE — 27100171 HC OXYGEN HIGH FLOW UP TO 24 HOURS

## 2024-10-26 PROCEDURE — 51798 US URINE CAPACITY MEASURE: CPT

## 2024-10-26 RX ORDER — LABETALOL HCL 20 MG/4 ML
10 SYRINGE (ML) INTRAVENOUS EVERY 4 HOURS PRN
Status: DISCONTINUED | OUTPATIENT
Start: 2024-10-26 | End: 2024-11-08 | Stop reason: HOSPADM

## 2024-10-26 RX ORDER — HYDRALAZINE HYDROCHLORIDE 20 MG/ML
10 INJECTION INTRAMUSCULAR; INTRAVENOUS EVERY 4 HOURS PRN
Status: DISCONTINUED | OUTPATIENT
Start: 2024-10-26 | End: 2024-11-08 | Stop reason: HOSPADM

## 2024-10-26 RX ORDER — FENTANYL CITRATE 50 UG/ML
50 INJECTION, SOLUTION INTRAMUSCULAR; INTRAVENOUS
Status: DISCONTINUED | OUTPATIENT
Start: 2024-10-26 | End: 2024-10-31

## 2024-10-26 RX ADMIN — INSULIN ASPART 4 UNITS: 100 INJECTION, SOLUTION INTRAVENOUS; SUBCUTANEOUS at 03:10

## 2024-10-26 RX ADMIN — CLOBAZAM 20 MG: 10 TABLET ORAL at 08:10

## 2024-10-26 RX ADMIN — INSULIN ASPART 4 UNITS: 100 INJECTION, SOLUTION INTRAVENOUS; SUBCUTANEOUS at 04:10

## 2024-10-26 RX ADMIN — PSYLLIUM HUSK 1 PACKET: 3.4 POWDER ORAL at 02:10

## 2024-10-26 RX ADMIN — LEVETIRACETAM 1000 MG: 100 INJECTION INTRAVENOUS at 07:10

## 2024-10-26 RX ADMIN — INSULIN ASPART 6 UNITS: 100 INJECTION, SOLUTION INTRAVENOUS; SUBCUTANEOUS at 07:10

## 2024-10-26 RX ADMIN — LABETALOL HYDROCHLORIDE 10 MG: 5 INJECTION, SOLUTION INTRAVENOUS at 08:10

## 2024-10-26 RX ADMIN — INSULIN ASPART 2 UNITS: 100 INJECTION, SOLUTION INTRAVENOUS; SUBCUTANEOUS at 12:10

## 2024-10-26 RX ADMIN — LACOSAMIDE 200 MG: 10 INJECTION INTRAVENOUS at 10:10

## 2024-10-26 RX ADMIN — PROPOFOL 10 MCG/KG/MIN: 10 INJECTION, EMULSION INTRAVENOUS at 11:10

## 2024-10-26 RX ADMIN — FAMOTIDINE 20 MG: 20 TABLET ORAL at 09:10

## 2024-10-26 RX ADMIN — INSULIN ASPART 6 UNITS: 100 INJECTION, SOLUTION INTRAVENOUS; SUBCUTANEOUS at 12:10

## 2024-10-26 RX ADMIN — ATORVASTATIN CALCIUM 40 MG: 40 TABLET, FILM COATED ORAL at 09:10

## 2024-10-26 RX ADMIN — INSULIN GLARGINE 25 UNITS: 100 INJECTION, SOLUTION SUBCUTANEOUS at 09:10

## 2024-10-26 RX ADMIN — HEPARIN SODIUM 5000 UNITS: 5000 INJECTION INTRAVENOUS; SUBCUTANEOUS at 10:10

## 2024-10-26 RX ADMIN — ACETAMINOPHEN 650 MG: 325 TABLET ORAL at 02:10

## 2024-10-26 RX ADMIN — INSULIN ASPART 6 UNITS: 100 INJECTION, SOLUTION INTRAVENOUS; SUBCUTANEOUS at 04:10

## 2024-10-26 RX ADMIN — INSULIN ASPART 3 UNITS: 100 INJECTION, SOLUTION INTRAVENOUS; SUBCUTANEOUS at 08:10

## 2024-10-26 RX ADMIN — PROPOFOL 50 MCG/KG/MIN: 10 INJECTION, EMULSION INTRAVENOUS at 08:10

## 2024-10-26 RX ADMIN — LEVETIRACETAM 1000 MG: 100 INJECTION INTRAVENOUS at 08:10

## 2024-10-26 RX ADMIN — CLOBAZAM 20 MG: 10 TABLET ORAL at 09:10

## 2024-10-26 RX ADMIN — HYDRALAZINE HYDROCHLORIDE 10 MG: 20 INJECTION INTRAMUSCULAR; INTRAVENOUS at 06:10

## 2024-10-26 RX ADMIN — PSYLLIUM HUSK 1 PACKET: 3.4 POWDER ORAL at 10:10

## 2024-10-26 RX ADMIN — INSULIN ASPART 6 UNITS: 100 INJECTION, SOLUTION INTRAVENOUS; SUBCUTANEOUS at 03:10

## 2024-10-26 RX ADMIN — PROPOFOL 50 MCG/KG/MIN: 10 INJECTION, EMULSION INTRAVENOUS at 04:10

## 2024-10-26 RX ADMIN — LABETALOL HYDROCHLORIDE 10 MG: 5 INJECTION, SOLUTION INTRAVENOUS at 05:10

## 2024-10-26 RX ADMIN — HYDRALAZINE HYDROCHLORIDE 10 MG: 20 INJECTION INTRAMUSCULAR; INTRAVENOUS at 01:10

## 2024-10-26 RX ADMIN — PSYLLIUM HUSK 1 PACKET: 3.4 POWDER ORAL at 08:10

## 2024-10-26 RX ADMIN — PROPOFOL 30 MCG/KG/MIN: 10 INJECTION, EMULSION INTRAVENOUS at 12:10

## 2024-10-26 RX ADMIN — HEPARIN SODIUM 5000 UNITS: 5000 INJECTION INTRAVENOUS; SUBCUTANEOUS at 05:10

## 2024-10-26 RX ADMIN — INSULIN ASPART 6 UNITS: 100 INJECTION, SOLUTION INTRAVENOUS; SUBCUTANEOUS at 08:10

## 2024-10-26 RX ADMIN — HEPARIN SODIUM 5000 UNITS: 5000 INJECTION INTRAVENOUS; SUBCUTANEOUS at 02:10

## 2024-10-26 RX ADMIN — SILODOSIN 4 MG: 4 CAPSULE ORAL at 09:10

## 2024-10-26 RX ADMIN — LABETALOL HYDROCHLORIDE 10 MG: 5 INJECTION, SOLUTION INTRAVENOUS at 12:10

## 2024-10-26 RX ADMIN — INSULIN ASPART 4 UNITS: 100 INJECTION, SOLUTION INTRAVENOUS; SUBCUTANEOUS at 07:10

## 2024-10-26 RX ADMIN — PROPOFOL 50 MCG/KG/MIN: 10 INJECTION, EMULSION INTRAVENOUS at 01:10

## 2024-10-26 NOTE — NURSING
Pt arrived back to room following MRI. LLE triple flexion now noted. MICHAEL Hess updated.        Pt was escorted by RN and RT on cardiac monitoring, O2, ambu bag, portable vent, and IV pole.        Patient placed back on bedside monitor with alarms audible, bed in low position with bed alarm on, call light within reach.   WCTM.    EEG cap not needed at this time. Will wait in AM formal EEG placement per MICHAEL Hess.

## 2024-10-26 NOTE — PROCEDURES
[facility]     PRELIMINARY C-EEG REPORT:  Review: 10/26/2024, 10:45 - 16:37     The study is done under tapering sedation, Propofol 40 -> 30 -> 20 -> 10 mKm:    Burst suppression pattern is seen at onset of review but with decreasing dose of Propofol, the periods of suppression decreases and towards the end of review, low amplitude continuous background is seen.    With decreasing sedation, bilateral sharply contoured delta-theta activity is noted more frequently with bilateral independent potential epileptiform sharp waves noted as well.    No electrographic seizures seen.  Irregular heart rhythm is noted on EKG.    Findings are suggestive of severe diffuse cerebral dysfunction with potential bilateral underlying epileptiform activity.     Full report to follow.  Will continue to monitor.     Thank you for involving us in the care of this patient.    Findings were conveyed to NCC team.    Diamond Carbajal MD, KOMAL(), TATI, MARI.  Neurology-Epilepsy.  [facility].

## 2024-10-26 NOTE — NURSING
Shift change, LLE with no movement previously with triple flexion. MICHAEL Hess made aware. To facilitate MRI.

## 2024-10-26 NOTE — PROGRESS NOTES
[facility] - Neuro Critical Care  Neurocritical Care  Progress Note    Admit Date: 10/17/2024  Service Date: 10/26/2024  Length of Stay: 9    Subjective:     Chief Complaint: Traumatic right-sided intracerebral hemorrhage without loss of consciousness    History of Present Illness: Percy Ramirez is a 83M PMHx of HTN, HLD, DM2, CAD s/p CABG x2, Afib on Coumadin, ILD on 4L of O2, HFrEF (45%), presenting as a transfer for a large traumatic R frontal IPH. Pt initially presented to [facility] ED yesterday ago after mechanical fall and was found to have 4mm parafalcine SDH. Repeat CTH was stable. Pt was discharged home on keppra and instructed to hold his blood thinners. On 10/17/24 pt was lethargic and fell. He was brought back to  ED. Blood glucose was >600 and he was febrile (101F). CTH revealed 5cm R frontal IPH with 7mm MLS. Kcentra, 10mg vitamin K, and 3% HTS bolus were given. He was transferred to [facility] for higher level of care.     Hospital Course: 10/19/2024: POD #1 from both his MI LS as well as his right subdural/intraparenchymal hemorrhage evacuation.  He is still intubated and although sedation is off, he is minimally responsive. Coreg doubled. NG tube placed and tube feeds started. Still on insulin drip.  10/20/2024: POD #2 s/p crani for SDH evacuation. Leukocytosis noted post-op, no accompanying fever/chills. Straight cath x1. Tachycardia responding to IVF boluses. Tube feeds started, will titrate to goal. Insulin gtt continued, plans to transition to subQ tomorrow.  10/21/2021: POD#3. EEG placed this morning, revealing multiple r-sided seizures. Keppra load completed and maintenance dose increased. Concern for LUE no longer WD, stat head CT showing new/increased hyperdensity in resection bed w/o worsening mass effect or MLS. 1L LR given for LEANDRO. Will continue to monitor Na.   10/22/24: POD#4. Addl seizures despite keppra load. Vimpat and propofol started yesterday with significant reduction in number.  1U PRBC transfused overnight. Required levo for short period of time after initiation of propofol.  10/23/24: POD#5.  Patient continues to be hyponatremic.  Free water volume increased.  Patient with total of 11 seizures yesterday.  Vimpat and propofol dosing increased with improvement.  10/24/24: POD#6. NGT adjusted overnight. No additional seizures on current AEM regimen. Platelets and Hgb decreasing, will order peripheral smear to assess for HIT  10/25/2024 POD#7. Addl seizure, Onfi added to AEM regimen. Drains removed by NSGY. Will attempt to wean propofol tomorrow.   10/26/2024 POD#8. MRI complete yx. Weaning propofol today.     Interval History:      NAEON. MRI obtained overnight, no significant changes or unexpected abnormalities appreciated. EEG replaced today. Weaning propofol at a rate of 10cc/hour.    Objective:     Vitals:  Temp: 99.9 °F (37.7 °C)  Pulse: 104  Rhythm: atrial rhythm  BP: (!) 146/67  MAP (mmHg): 96  Resp: (!) 27  SpO2: (!) 94 %  Oxygen Concentration (%): 40  Vent Mode: A/C  Set Rate: 16 BPM  Vt Set: 500 mL  PEEP/CPAP: 5 cmH20  Peak Airway Pressure: 16 cmH20  Mean Airway Pressure: 9.6 cmH20  Plateau Pressure: 13 cmH20    Temp  Min: 96.1 °F (35.6 °C)  Max: 99.9 °F (37.7 °C)  Pulse  Min: 52  Max: 104  BP  Min: 99/56  Max: 146/67  MAP (mmHg)  Min: 71  Max: 96  Resp  Min: 17  Max: 27  SpO2  Min: 94 %  Max: 100 %  Oxygen Concentration (%)  Min: 40  Max: 40    10/25 0701 - 10/26 0700  In: 3272.1 [I.V.:782.1]  Out: 2075 [Urine:1800]   Unmeasured Output  Urine Occurrence: 1  Stool Occurrence: 1  Pad Count: 1        Physical Exam  Constitutional:       General: He is not in acute distress.     Appearance: He is not toxic-appearing.      Interventions: He is sedated and intubated.   Cardiovascular:      Rate and Rhythm: Normal rate.   Pulmonary:      Effort: No respiratory distress. He is intubated.      Breath sounds: Normal breath sounds.   Abdominal:      General: There is no distension.       Palpations: Abdomen is soft.   Skin:     General: Skin is warm and dry.   Neurological:      Comments: Unable to obtain reliable exam due to level of sedation              Medications:  [id number]  propofol, Last Rate: 30 mcg/kg/min (10/26/24 1400)    Scheduledatorvastatin, 40 mg, Daily  cloBAZam, 20 mg, Q12H  famotidine, 20 mg, Daily  heparin (porcine), 5,000 Units, Q8H  insulin aspart U-100, 6 Units, 6 times per day  insulin glargine U-100, 25 Units, Daily  lacosamide (Vimpat) IV orderable, 200 mg, Q12H  levETIRAcetam (Keppra) IV (PEDS and ADULTS), 1,000 mg, Q12H  psyllium husk (aspartame), 1 packet, TID  silodosin, 4 mg, Daily    PRNacetaminophen, 650 mg, Q6H PRN  D10W, , Continuous PRN  dextrose 10%, 12.5 g, PRN  dextrose 10%, 25 g, PRN  glucagon (human recombinant), 1 mg, PRN  hydrALAZINE, 10 mg, Q4H PRN  insulin aspart U-100, 0-10 Units, Q4H PRN  labetalol, 10 mg, Q4H PRN  magnesium oxide, 800 mg, PRN  magnesium oxide, 800 mg, PRN  ondansetron, 4 mg, Q8H PRN  potassium bicarbonate, 35 mEq, PRN  potassium bicarbonate, 50 mEq, PRN  potassium bicarbonate, 60 mEq, PRN  potassium, sodium phosphates, 2 packet, PRN  potassium, sodium phosphates, 2 packet, PRN  potassium, sodium phosphates, 2 packet, PRN      Today I personally reviewed pertinent medications, laboratory results, notably:    Diet  Diet NPO Except for: Medication  Diet NPO Except for: Medication    Assessment/Plan:     Neuro  Seizures  EEG placed morning of 10/21 due to delay in return to expected LOC  -Multiple seizures throughout the day  -Has been loaded with keppra and vimpat  -Receiving maintenance keppra and vimpat, on prop drip at 50 with good response  -Addl seizure evening of 10/24, Onfi added on.   -Weaning propofol today    Psychiatric  Major depressive disorder, recurrent episode, mild  Restart home SSRI once able to take p.o.    Pulmonary  ILD (interstitial lung disease)  ILD on 4L of O2    SpO2 goal > 88%  Duo nebs and  tiotropium  Currently intubated    Cardiac/Vascular  Chronic combined systolic and diastolic heart failure  06/29/2024 echo with EF 45%, was previously 30-40%    Repeat echo shows his EF is still 45%  Holding maintenance fluids in the setting of starting enteral feeds  HDS    Persistent atrial fibrillation  In atrial fibrillation on admit  Hold Coumadin in setting of acute intraparenchymal hemorrhage  Heparin subcu for VTE prophylaxis    Hyperlipidemia LDL goal <100  Lipid panel showed low LDL, low HDL, low total cholesterol  Atorvastatin    Coronary artery disease  S/p CABG x2 in 2004  Patient has reportedly not been on Plavix recently because of his Coumadin      Benign hypertension with chronic kidney disease, stage III  SBP goal < 160  Prn labetalol and hydralazine   Holding scheduled antihypertensives at this time.      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 Beta hydroxybutyrate and urine ketones negative. Serum CO2 22. Given SQ insulin at OSH.  On arrival to [facility], . Hb A1c 9.9% on admit.     Tube feeds started, will titrate to goal.  Insulin gtt transitioned to subcu insulin.     Other  Obstructive sleep apnea treated with BiPAP  Daily ABG          The patient is being Prophylaxed for:  Venous Thromboembolism with: Chemical  Stress Ulcer with: H2B  Ventilator Pneumonia with: chlorhexidine oral care    Activity Orders            Turn  patient starting at 10/18 0000    Diet NPO Except for: Medication: NPO starting at 10/17 0395          Full Code    Dolores Moore DO  Neurocritical Care  [facility] - Neuro Critical Care

## 2024-10-26 NOTE — ASSESSMENT & PLAN NOTE
LEANDRO on CKD, Cr 1.8 on initial presentation to ED and now 2.2    Avoid nephrotoxic agents  Renally dose medications  Q 1 hour I&Os  Cr remains elevated to 2.5 (10/21); Na 155; will d/c valsartan and give 1 L LR   Cr significantly improved to 1.2, Na 140; will hold enteral free water at this time

## 2024-10-26 NOTE — PROGRESS NOTES
[facility]  Neurosurgery  Progress Note    Subjective:     History of Present Illness: Percy Ramirez is a 83 y.o. 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 Pt initially presented to  ED 2 days ago after mechanical fall and was found to have small SDH. Repeat scan was stable and patient was discharged home with outpatient follow up and instructions to hold his blood thinners. He re-presented to  ED yesterday after suffering another unwitnessed fall and family noticing him becoming more lethargic with c/o neck and head pain. Blood glucose >600 at OSH. CTH was indicative of 5cm right frontal IPC with 7mm midline shift. K-centra was given at OSH and pt was transferred to [facility] for higher level of care. Upon arrival pt INR was 1.2. Pt has waxing and waning exam, at times oriented and following commands and at other times lethargic, disoriented, and not responding. Further history limited due to patient's mental status change.    Post-Op Info:  Procedure(s) (LRB):  CRANIOTOMY, FOR SUBDURAL HEMATOMA EVACUATION (Right)   8 Days Post-Op   Interval History: 10/26: NAEON. Drains removed yesterday. GoC ongoing. MRI w/wo without clear underlying mass. Neuro stable, poor exam    Medications:  Continuous Infusions:   D10W   Intravenous Continuous PRN        propofol  50 mcg/kg/min Intravenous Continuous 25.1 mL/hr at 10/26/24 0835 50 mcg/kg/min at 10/26/24 0835     Scheduled Meds:   atorvastatin  40 mg Per OG tube Daily    cloBAZam  20 mg Per OG tube Q12H    famotidine  20 mg Per OG tube Daily    heparin (porcine)  5,000 Units Subcutaneous Q8H    insulin aspart U-100  6 Units Subcutaneous 6 times per day    insulin glargine U-100  25 Units Subcutaneous Daily    lacosamide (Vimpat) IV orderable  200 mg Intravenous Q12H    levETIRAcetam (Keppra) IV (PEDS and ADULTS)  1,000 mg Intravenous Q12H     silodosin  4 mg Per OG tube Daily     PRN Meds:  Current Facility-Administered Medications:     acetaminophen, 650 mg, Per OG tube, Q6H PRN    D10W, , Intravenous, Continuous PRN    dextrose 10%, 12.5 g, Intravenous, PRN    dextrose 10%, 25 g, Intravenous, PRN    glucagon (human recombinant), 1 mg, Intramuscular, PRN    hydrALAZINE, 10 mg, Intravenous, Q4H PRN    insulin aspart U-100, 0-10 Units, Subcutaneous, Q4H PRN    labetalol, 10 mg, Intravenous, Q4H PRN    magnesium oxide, 800 mg, Per OG tube, PRN    magnesium oxide, 800 mg, Per OG tube, PRN    ondansetron, 4 mg, Intravenous, Q8H PRN    potassium bicarbonate, 35 mEq, Per OG tube, PRN    potassium bicarbonate, 50 mEq, Per OG tube, PRN    potassium bicarbonate, 60 mEq, Per OG tube, PRN    potassium, sodium phosphates, 2 packet, Per OG tube, PRN    potassium, sodium phosphates, 2 packet, Per OG tube, PRN    potassium, sodium phosphates, 2 packet, Per OG tube, PRN     Review of Systems  Objective:     Weight: 83.5 kg (184 lb)  Body mass index is 27.98 kg/m².  Vital Signs (Most Recent):  Temp: 96.8 °F (36 °C) (10/26/24 0701)  Pulse: 64 (10/26/24 0801)  Resp: 19 (10/26/24 0801)  BP: 133/62 (10/26/24 0801)  SpO2: 99 % (10/26/24 0801) Vital Signs (24h Range):  Temp:  [96.4 °F (35.8 °C)-98 °F (36.7 °C)] 96.8 °F (36 °C)  Pulse:  [52-69] 64  Resp:  [12-24] 19  SpO2:  [94 %-100 %] 99 %  BP: ()/(54-65) 133/62  Arterial Line BP: (102-167)/(43-67) 143/52     Date 10/26/24 0700 - 10/27/24 0659   Shift 7783-9533 7838-6378 4467-3792 24 Hour Total   INTAKE   I.V.(mL/kg) 50.5(0.6)   50.5(0.6)   NG/   100   Shift Total(mL/kg) 150.5(1.8)   150.5(1.8)   OUTPUT   Shift Total(mL/kg)       Weight (kg) 83.5 83.5 83.5 83.5              Vent Mode: A/C  Oxygen Concentration (%):  [40] 40  Resp Rate Total:  [17 br/min-25 br/min] 17 br/min  Vt Set:  [500 mL] 500 mL  PEEP/CPAP:  [5 cmH20] 5 cmH20  Mean Airway Pressure:  [8.3 cmH20-9.9 cmH20] 9.3 cmH20             NG/OG Tube  "10/23/24 2100 14 Fr. Center mouth (Active)   Placement Check placement verified by aspirate characteristics 10/25/24 1100   Tolerance no signs/symptoms of discomfort 10/25/24 1100   Securement secured to commercial device 10/25/24 1100   Clamp Status/Tolerance unclamped;no abdominal discomfort 10/25/24 1100   Suction Setting/Drainage Method suction at the bedside 10/25/24 1100   Insertion Site Appearance no redness, warmth, tenderness, skin breakdown, drainage 10/25/24 1100   Drainage None 10/25/24 1100   Flush/Irrigation flushed w/;water 10/25/24 1100   Feeding Type continuous;by pump 10/25/24 1100   Feeding Action feeding continued 10/25/24 1100   Current Rate (mL/hr) 50 mL/hr 10/25/24 1100   Goal Rate (mL/hr) 50 mL/hr 10/25/24 1100   Intake (mL) 90 mL 10/25/24 0802   Water Bolus (mL) 400 mL 10/25/24 0000   Formula Name Diabetic Isosource 10/25/24 0902   Intake (mL) - Formula Tube Feeding 50 10/25/24 1100            Rectal Tube 10/22/24 1718 rectal tube w/ balloon (indicate number of mLs) (Active)   Reposition drainage bags for BMS & Longoria on opposite sides of bed 10/25/24 1100   Outcome gas expelled, stool evacuated 10/25/24 1100   Stool Color Brown 10/25/24 1100   Insertion Site Appearance no redness, warmth, tenderness, skin breakdown, drainage 10/25/24 1100   Flush/Irrigation flushed w/;water;no resistance met 10/25/24 0902   Intake (mL) 40 mL 10/23/24 2102   Rectal Tube Output 300 mL 10/23/24 0602     Neurosurgery Physical Exam     [id number]  PERRL  +c/g, no gag  TF LLE vs min WD   No movement RLE  No movement BUE      Significant Labs:  Recent Labs   Lab 10/25/24  0014 10/26/24  0032   * 208*   * 140   K 4.8 5.0   * 111*   CO2 22* 22*   BUN 39* 41*   CREATININE 1.3 1.2   CALCIUM 7.9* 8.0*   MG 3.1* 3.3*     Recent Labs   Lab 10/25/24  0014 10/25/24  0418 10/26/24  0032   WBC 6.89  --  6.46   HGB 7.6*  --  7.2*   HCT 24.4* 20* 23.9*   *  --  140*     No results for input(s): "LABPT", " ""INR", "APTT" in the last 48 hours.  Microbiology Results (last 7 days)       Procedure Component Value Units Date/Time    Blood culture [[id number]] Collected: 10/18/24 0335    Order Status: Completed Specimen: Blood from Peripheral, Foot, Right Updated: 10/23/24 0612     Blood Culture, Routine No growth after 5 days.    Blood culture [[id number]] Collected: 10/18/24 0345    Order Status: Completed Specimen: Blood from Peripheral, Hand, Left Updated: 10/23/24 0612     Blood Culture, Routine No growth after 5 days.          All pertinent labs from the last 24 hours have been reviewed.    Significant Diagnostics:  I have reviewed all pertinent imaging results/findings within the past 24 hours.  MRI Brain W WO Contrast    Result Date: 10/25/2024  Status post right craniotomy for parenchymal and subdural hematoma evacuation.  Similar-sized mix intensity collection at the right frontal lobe and resection cavity. Similar sized extra-axial collection underlying the craniotomy. Similar small volume intraventricular hemorrhage. Minimal susceptibility artifact along the posterior sylvian fissures and few sulci in the left parietal lobe, favoring subarachnoid blood products. No new or worsening hemorrhage. Electronically signed by resident: Luther Ramirez Date:    10/25/2024 Time:    20:48 Electronically signed by: Mauricio Banda MD Date:    10/25/2024 Time:    21:18     Assessment/Plan:     * Traumatic right-sided intracerebral hemorrhage without loss of consciousness  84 y.o. 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 Given K-centra at OSH.     Taken to OR 10/18 for R frontal IPH evac via eyebrow supraorbital craniotomy. Unfortunately, interval development of large SDH on post op scan, taken back to OR emergently for acute SDH evacuation.    Imaging:  -[facility] OSH 10/17: 5cm R frontal ICH with 7mm MLS and some intraventricular blood in posterior lateral vent   -[facility] 10/17: no acute processes   [facility]   -[facility] 10/18: post op clot evac with new holoconvexity R SDH, 12 mm MLS   -Post op CTH 10/18: s/p SDH evacuation with appropriate evac, CTA neg   -CTH 10/21: new/increased hyperdensity in the resection cavity without worsening mass effect or midline shift   -[facility] 10/21: stable    S/p R SDH evac on 10/18    Plan:  Patient admitted to ICU on telemetry, q1h neuro checks  Hold anti-plt/coag medications. Given K-centra for reversal at OSH. INR 1.2 on arrival  MRI brain w wo ordered without clear underlying mass, no significant amyloid  SBP <140   Na >135  Keppra 1g q12, vimpat, onfi and prop added - EEG removed for MRI, plan to rehook today  LEANDRO management per NCC  Transfuse RBC for goal Hgb >7  No HOB restrictions  Drains removed with stitch  WTE plan per NCC; GoC per NCC  Continue to monitor clinically, notify NSGY immediately with any changes in neuro status    Plan discussed with Dr. Condon    Dispo: admitted to ICU            Steve Matute MD  Neurosurgery  [facility]

## 2024-10-26 NOTE — ASSESSMENT & PLAN NOTE
Initial presentation c/f HHS w glucose > 600. Beta hydroxybutyrate and urine ketones negative. Serum CO2 22. Given SQ insulin at OSH.  On arrival to [facility], . Hb A1c 9.9% on admit.     Tube feeds started, will titrate to goal.  Insulin gtt transitioned to subcu insulin.

## 2024-10-26 NOTE — PLAN OF CARE
"[facility] Care Plan    POC reviewed with Percy Ramirez at 0300. Patient unable to verbalize understanding. Questions and concerns addressed. No acute events today. Pt progressing toward goals. Will continue to monitor. See below and flowsheets for full assessment and VS info.     -MRI Facilitated.  -for formal EEG placement in AM  -Labetalol given x1  -Flexi output- 200  -straighcathx1        Is this a stroke patient? yes- Stroke booklet reviewed with patient and family, risk factors identified for patient and stroke booklet remains at bedside for ongoing education.     Care individualization: heavyweight blanket applied    Neuro:  Federico Coma Scale  Best Eye Response: 1-->(E1) none  Best Motor Response: 3-->(M3) flexion to pain  Best Verbal Response: 1-->(V1) none  Federico Coma Scale Score: 5  Assessment Qualifiers: patient intubated, patient chemically sedated or paralyzed  Pupil PERRLA: yes     24 hr Temp:  [96.4 °F (35.8 °C)-100.1 °F (37.8 °C)]     CV:   Rhythm: atrial rhythm  BP goals:   SBP < 140  MAP > 65    Resp:      Vent Mode: A/C  Set Rate: 16 BPM  Oxygen Concentration (%): 40  Vt Set: 500 mL  PEEP/CPAP: 5 cmH20  Pressure Support: 5 cmH20    Plan: N/A    GI/:     Diet/Nutrition Received: tube feeding  Last Bowel Movement: 10/25/24  Voiding Characteristics: external catheter    Intake/Output Summary (Last 24 hours) at 10/26/2024 0632  Last data filed at 10/26/2024 0605  Gross per 24 hour   Intake 3257.8 ml   Output 2075 ml   Net 1182.8 ml     Unmeasured Output  Urine Occurrence: 1  Stool Occurrence: 1  Pad Count: 1    Labs/Accuchecks:  Recent Labs   Lab 10/26/24  0032   WBC 6.46   RBC 2.57*   HGB 7.2*   HCT 23.9*   *      Recent Labs   Lab 10/26/24  0032      K 5.0   CO2 22*   *   BUN 41*   CREATININE 1.2   ALKPHOS 74   ALT <5*   AST 30   BILITOT 0.3    No results for input(s): "PROTIME", "INR", "APTT", "HEPANTIXA" in the last 168 hours. No results for input(s): "CPK", "CPKMB", " ""TROPONINI", "MB" in the last 168 hours.    Electrolytes: N/A - electrolytes WDL  Accuchecks: Q4H    Gtts:   D10W   Intravenous Continuous PRN        propofol  50 mcg/kg/min Intravenous Continuous 25.1 mL/hr at 10/26/24 0605 50 mcg/kg/min at 10/26/24 0605       LDA/Wounds:    Mykel Risk Assessment  Sensory Perception: 1-->completely limited  Moisture: 3-->occasionally moist  Activity: 1-->bedfast  Mobility: 1-->completely immobile  Nutrition: 3-->adequate  Friction and Shear: 3-->no apparent problem  Mykel Score: 12  Is your mykel score 12 or less? yes heel boots on    Nurses Note -- 4 Eyes    Is there altered skin present?  No  Second RN/Staff Member:  RADHA Parsons        [facility]   "

## 2024-10-26 NOTE — ASSESSMENT & PLAN NOTE
84 y.o. 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 Given K-centra at OSH.     Taken to OR 10/18 for R frontal IPH evac via eyebrow supraorbital craniotomy. Unfortunately, interval development of large SDH on post op scan, taken back to OR emergently for acute SDH evacuation.    Imaging:  -CTH OSH 10/17: 5cm R frontal ICH with 7mm MLS and some intraventricular blood in posterior lateral vent   -CT Csp OSH 10/17: no acute processes   -rCTH 10/18: grossly stable   -CTA 10/18: post op clot evac with new holoconvexity R SDH, 12 mm MLS   -Post op CTH 10/18: s/p SDH evacuation with appropriate evac, CTA neg   -CTH 10/21: new/increased hyperdensity in the resection cavity without worsening mass effect or midline shift   -rCTH 10/21: stable    S/p R SDH evac on 10/18    Plan:  Patient admitted to ICU on telemetry, q1h neuro checks  Hold anti-plt/coag medications. Given K-centra for reversal at OSH. INR 1.2 on arrival  MRI brain w wo ordered without clear underlying mass, no significant amyloid  SBP <140   Na >135  Keppra 1g q12, vimpat, onfi and prop added - EEG removed for MRI, plan to rehook today  LEANDRO management per NCC  Transfuse RBC for goal Hgb >7  No HOB restrictions  Drains removed with stitch  WTE plan per NCC; GoC per NCC  Continue to monitor clinically, notify NSGY immediately with any changes in neuro status    Plan discussed with Dr. Condon    Dispo: admitted to ICU

## 2024-10-26 NOTE — SUBJECTIVE & OBJECTIVE
Interval History: 10/26: NAEON. Drains removed yesterday. GoC ongoing. MRI w/wo without clear underlying mass. Neuro stable, poor exam    Medications:  Continuous Infusions:   D10W   Intravenous Continuous PRN        propofol  50 mcg/kg/min Intravenous Continuous 25.1 mL/hr at 10/26/24 0835 50 mcg/kg/min at 10/26/24 0835     Scheduled Meds:   atorvastatin  40 mg Per OG tube Daily    cloBAZam  20 mg Per OG tube Q12H    famotidine  20 mg Per OG tube Daily    heparin (porcine)  5,000 Units Subcutaneous Q8H    insulin aspart U-100  6 Units Subcutaneous 6 times per day    insulin glargine U-100  25 Units Subcutaneous Daily    lacosamide (Vimpat) IV orderable  200 mg Intravenous Q12H    levETIRAcetam (Keppra) IV (PEDS and ADULTS)  1,000 mg Intravenous Q12H    silodosin  4 mg Per OG tube Daily     PRN Meds:  Current Facility-Administered Medications:     acetaminophen, 650 mg, Per OG tube, Q6H PRN    D10W, , Intravenous, Continuous PRN    dextrose 10%, 12.5 g, Intravenous, PRN    dextrose 10%, 25 g, Intravenous, PRN    glucagon (human recombinant), 1 mg, Intramuscular, PRN    hydrALAZINE, 10 mg, Intravenous, Q4H PRN    insulin aspart U-100, 0-10 Units, Subcutaneous, Q4H PRN    labetalol, 10 mg, Intravenous, Q4H PRN    magnesium oxide, 800 mg, Per OG tube, PRN    magnesium oxide, 800 mg, Per OG tube, PRN    ondansetron, 4 mg, Intravenous, Q8H PRN    potassium bicarbonate, 35 mEq, Per OG tube, PRN    potassium bicarbonate, 50 mEq, Per OG tube, PRN    potassium bicarbonate, 60 mEq, Per OG tube, PRN    potassium, sodium phosphates, 2 packet, Per OG tube, PRN    potassium, sodium phosphates, 2 packet, Per OG tube, PRN    potassium, sodium phosphates, 2 packet, Per OG tube, PRN     Review of Systems  Objective:     Weight: 83.5 kg (184 lb)  Body mass index is 27.98 kg/m².  Vital Signs (Most Recent):  Temp: 96.8 °F (36 °C) (10/26/24 0701)  Pulse: 64 (10/26/24 0801)  Resp: 19 (10/26/24 0801)  BP: 133/62 (10/26/24 0801)  SpO2: 99 %  (10/26/24 0801) Vital Signs (24h Range):  Temp:  [96.4 °F (35.8 °C)-98 °F (36.7 °C)] 96.8 °F (36 °C)  Pulse:  [52-69] 64  Resp:  [12-24] 19  SpO2:  [94 %-100 %] 99 %  BP: ()/(54-65) 133/62  Arterial Line BP: (102-167)/(43-67) 143/52     Date 10/26/24 0700 - 10/27/24 0659   Shift 3544-3520 1657-2142 0271-7763 24 Hour Total   INTAKE   I.V.(mL/kg) 50.5(0.6)   50.5(0.6)   NG/   100   Shift Total(mL/kg) 150.5(1.8)   150.5(1.8)   OUTPUT   Shift Total(mL/kg)       Weight (kg) 83.5 83.5 83.5 83.5              Vent Mode: A/C  Oxygen Concentration (%):  [40] 40  Resp Rate Total:  [17 br/min-25 br/min] 17 br/min  Vt Set:  [500 mL] 500 mL  PEEP/CPAP:  [5 cmH20] 5 cmH20  Mean Airway Pressure:  [8.3 cmH20-9.9 cmH20] 9.3 cmH20             NG/OG Tube 10/23/24 2100 14 Fr. Center mouth (Active)   Placement Check placement verified by aspirate characteristics 10/25/24 1100   Tolerance no signs/symptoms of discomfort 10/25/24 1100   Securement secured to commercial device 10/25/24 1100   Clamp Status/Tolerance unclamped;no abdominal discomfort 10/25/24 1100   Suction Setting/Drainage Method suction at the bedside 10/25/24 1100   Insertion Site Appearance no redness, warmth, tenderness, skin breakdown, drainage 10/25/24 1100   Drainage None 10/25/24 1100   Flush/Irrigation flushed w/;water 10/25/24 1100   Feeding Type continuous;by pump 10/25/24 1100   Feeding Action feeding continued 10/25/24 1100   Current Rate (mL/hr) 50 mL/hr 10/25/24 1100   Goal Rate (mL/hr) 50 mL/hr 10/25/24 1100   Intake (mL) 90 mL 10/25/24 0802   Water Bolus (mL) 400 mL 10/25/24 0000   Formula Name Diabetic Isosource 10/25/24 0902   Intake (mL) - Formula Tube Feeding 50 10/25/24 1100            Rectal Tube 10/22/24 1718 rectal tube w/ balloon (indicate number of mLs) (Active)   Reposition drainage bags for BMS & Longoria on opposite sides of bed 10/25/24 1100   Outcome gas expelled, stool evacuated 10/25/24 1100   Stool Color Brown 10/25/24 1100  "  Insertion Site Appearance no redness, warmth, tenderness, skin breakdown, drainage 10/25/24 1100   Flush/Irrigation flushed w/;water;no resistance met 10/25/24 0902   Intake (mL) 40 mL 10/23/24 2102   Rectal Tube Output 300 mL 10/23/24 0602     Neurosurgery Physical Exam     E1VTM4  PERRL  +c/g, no gag  TF LLE vs min WD   No movement RLE  No movement BUE      Significant Labs:  Recent Labs   Lab 10/25/24  0014 10/26/24  0032   * 208*   * 140   K 4.8 5.0   * 111*   CO2 22* 22*   BUN 39* 41*   CREATININE 1.3 1.2   CALCIUM 7.9* 8.0*   MG 3.1* 3.3*     Recent Labs   Lab 10/25/24  0014 10/25/24  0418 10/26/24  0032   WBC 6.89  --  6.46   HGB 7.6*  --  7.2*   HCT 24.4* 20* 23.9*   *  --  140*     No results for input(s): "LABPT", "INR", "APTT" in the last 48 hours.  Microbiology Results (last 7 days)       Procedure Component Value Units Date/Time    Blood culture [[id number]] Collected: 10/18/24 0335    Order Status: Completed Specimen: Blood from Peripheral, Foot, Right Updated: 10/23/24 0612     Blood Culture, Routine No growth after 5 days.    Blood culture [[id number]] Collected: 10/18/24 0345    Order Status: Completed Specimen: Blood from Peripheral, Hand, Left Updated: 10/23/24 0612     Blood Culture, Routine No growth after 5 days.          All pertinent labs from the last 24 hours have been reviewed.    Significant Diagnostics:  I have reviewed all pertinent imaging results/findings within the past 24 hours.  MRI Brain W WO Contrast    Result Date: 10/25/2024  Status post right craniotomy for parenchymal and subdural hematoma evacuation.  Similar-sized mix intensity collection at the right frontal lobe and resection cavity. Similar sized extra-axial collection underlying the craniotomy. Similar small volume intraventricular hemorrhage. Minimal susceptibility artifact along the posterior sylvian fissures and few sulci in the left parietal lobe, favoring subarachnoid blood products. No " new or worsening hemorrhage. Electronically signed by resident: Luther Ramirez Date:    10/25/2024 Time:    20:48 Electronically signed by: Mauricio Banda MD Date:    10/25/2024 Time:    21:18

## 2024-10-26 NOTE — SUBJECTIVE & OBJECTIVE
Interval History:      NAEON. MRI obtained overnight, no significant changes or unexpected abnormalities appreciated. EEG replaced today. Weaning propofol at a rate of 10cc/hour.    Objective:     Vitals:  Temp: 99.9 °F (37.7 °C)  Pulse: 104  Rhythm: atrial rhythm  BP: (!) 146/67  MAP (mmHg): 96  Resp: (!) 27  SpO2: (!) 94 %  Oxygen Concentration (%): 40  Vent Mode: A/C  Set Rate: 16 BPM  Vt Set: 500 mL  PEEP/CPAP: 5 cmH20  Peak Airway Pressure: 16 cmH20  Mean Airway Pressure: 9.6 cmH20  Plateau Pressure: 13 cmH20    Temp  Min: 96.1 °F (35.6 °C)  Max: 99.9 °F (37.7 °C)  Pulse  Min: 52  Max: 104  BP  Min: 99/56  Max: 146/67  MAP (mmHg)  Min: 71  Max: 96  Resp  Min: 17  Max: 27  SpO2  Min: 94 %  Max: 100 %  Oxygen Concentration (%)  Min: 40  Max: 40    10/25 0701 - 10/26 0700  In: 3272.1 [I.V.:782.1]  Out: 2075 [Urine:1800]   Unmeasured Output  Urine Occurrence: 1  Stool Occurrence: 1  Pad Count: 1        Physical Exam  Constitutional:       General: He is not in acute distress.     Appearance: He is not toxic-appearing.      Interventions: He is sedated and intubated.   Cardiovascular:      Rate and Rhythm: Normal rate.   Pulmonary:      Effort: No respiratory distress. He is intubated.      Breath sounds: Normal breath sounds.   Abdominal:      General: There is no distension.      Palpations: Abdomen is soft.   Skin:     General: Skin is warm and dry.   Neurological:      Comments: Unable to obtain reliable exam due to level of sedation              Medications:  [id number]  propofol, Last Rate: 30 mcg/kg/min (10/26/24 1400)    Scheduledatorvastatin, 40 mg, Daily  cloBAZam, 20 mg, Q12H  famotidine, 20 mg, Daily  heparin (porcine), 5,000 Units, Q8H  insulin aspart U-100, 6 Units, 6 times per day  insulin glargine U-100, 25 Units, Daily  lacosamide (Vimpat) IV orderable, 200 mg, Q12H  levETIRAcetam (Keppra) IV (PEDS and ADULTS), 1,000 mg, Q12H  psyllium husk (aspartame), 1 packet, TID  silodosin, 4 mg,  Daily    PRNacetaminophen, 650 mg, Q6H PRN  D10W, , Continuous PRN  dextrose 10%, 12.5 g, PRN  dextrose 10%, 25 g, PRN  glucagon (human recombinant), 1 mg, PRN  hydrALAZINE, 10 mg, Q4H PRN  insulin aspart U-100, 0-10 Units, Q4H PRN  labetalol, 10 mg, Q4H PRN  magnesium oxide, 800 mg, PRN  magnesium oxide, 800 mg, PRN  ondansetron, 4 mg, Q8H PRN  potassium bicarbonate, 35 mEq, PRN  potassium bicarbonate, 50 mEq, PRN  potassium bicarbonate, 60 mEq, PRN  potassium, sodium phosphates, 2 packet, PRN  potassium, sodium phosphates, 2 packet, PRN  potassium, sodium phosphates, 2 packet, PRN      Today I personally reviewed pertinent medications, laboratory results, notably:    Diet  Diet NPO Except for: Medication  Diet NPO Except for: Medication

## 2024-10-26 NOTE — ASSESSMENT & PLAN NOTE
EEG placed morning of 10/21 due to delay in return to expected LOC  -Multiple seizures throughout the day  -Has been loaded with keppra and vimpat  -Receiving maintenance keppra and vimpat, on prop drip at 50 with good response  -Addl seizure evening of 10/24, Onfi added on.   -Weaning propofol today

## 2024-10-26 NOTE — PLAN OF CARE
"[facility] Care Plan  POC reviewed with Percy Ramirez and family at 1400. Patient and Family verbalized understanding. Questions and concerns addressed. No acute events today. Pt progressing toward goals. Will continue to monitor. See below and flowsheets for full assessment and VS info.     -cEEG replaced  -Plan to wean propofol by 10 q2hrs, plan to be completely off @ 1800  -SBP parameters now <160  -prn labetalol given x2 and hydralazine x1  -Tylenol given x1, Tmax 99.9  -Straigth cath x2      Is this a stroke patient? no    Neuro:  [place] Coma Scale  Best Eye Response: 1-->(E1) none  Best Motor Response: 3-->(M3) flexion to pain  Best Verbal Response: 1-->(V1) none  [place] Coma Scale Score: 5  Assessment Qualifiers: patient intubated, patient chemically sedated or paralyzed  Pupil PERRLA: yes     24 hr Temp:  [96.1 °F (35.6 °C)-99.9 °F (37.7 °C)]     CV:   Rhythm: atrial rhythm  BP goals:   SBP < 160  MAP > 65    Resp:      Vent Mode: A/C  Set Rate: 16 BPM  Oxygen Concentration (%): 40  Vt Set: 500 mL  PEEP/CPAP: 5 cmH20  Pressure Support: 5 cmH20    Plan: wean to extubate    GI/:     Diet/Nutrition Received: tube feeding  Last Bowel Movement: 10/26/24  Voiding Characteristics: external catheter    Intake/Output Summary (Last 24 hours) at 10/26/2024 1715  Last data filed at 10/26/2024 1700  Gross per 24 hour   Intake 3249.87 ml   Output 2725 ml   Net 524.87 ml     Unmeasured Output  Urine Occurrence: 1  Stool Occurrence: 1  Pad Count: 1    Labs/Accuchecks:  Recent Labs   Lab 10/26/24  0032   WBC 6.46   RBC 2.57*   HGB 7.2*   HCT 23.9*   *      Recent Labs   Lab 10/26/24  0032      K 5.0   CO2 22*   *   BUN 41*   CREATININE 1.2   ALKPHOS 74   ALT <5*   AST 30   BILITOT 0.3    No results for input(s): "PROTIME", "INR", "APTT", "HEPANTIXA" in the last 168 hours. No results for input(s): "CPK", "CPKMB", "TROPONINI", "MB" in the last 168 hours.    Electrolytes: N/A - electrolytes WDL  Accuchecks: " Q4H    Gtts:   D10W   Intravenous Continuous PRN        propofol  10 mcg/kg/min Intravenous Continuous 5 mL/hr at 10/26/24 1700 10 mcg/kg/min at 10/26/24 1700       LDA/Wounds:    Mykel Risk Assessment  Sensory Perception: 1-->completely limited  Moisture: 3-->occasionally moist  Activity: 1-->bedfast  Mobility: 1-->completely immobile  Nutrition: 3-->adequate  Friction and Shear: 2-->potential problem  Mykel Score: 11    Is your mykel score 12 or less? yes heel boots on      Nurses Note -- 4 Eyes    Is there altered skin present?  No  Second RN/Staff Member:  Mikey RN      Restraints:   Restraint Order  Length of Order: Order good for next 24 hours or when removed.  Date that the current order will : 10/27/24  Time that the current order will :   Order Upon Application: Yes    [facility]

## 2024-10-26 NOTE — ASSESSMENT & PLAN NOTE
SBP goal < 160  Prn labetalol and hydralazine   Holding scheduled antihypertensives at this time.

## 2024-10-26 NOTE — PROCEDURES
[facility]     PRELIMINARY C-EEG REPORT:  Review: 10/26/2024, 09:07 (start) - 10:45     The study is done under sedation, Propofol 50 -> 40 mKm:    Burst suppression pattern is seen throughout the period of review.  Bursts comprise of sharply contoured delta-theta (3-5 Hz) activity with potential epileptiform sharp waves noted on the right.  Periods of suppression are noted to last 3-7 seconds.  No electrographic seizures seen.  Irregular heart rhythm is noted on EKG.    Findings are suggestive of severe diffuse cerebral dysfunction with potential underlying epileptiform activity on the right.     Full report to follow.  Will continue to monitor.     Thank you for involving us in the care of this patient.    Diamond Carbajal MD, KOMAL(), TATI, MARI.  Neurology-Epilepsy.  [facility].

## 2024-10-27 PROBLEM — D72.829 LEUKOCYTOSIS: Status: ACTIVE | Noted: 2024-10-27

## 2024-10-27 LAB
ALBUMIN SERPL BCP-MCNC: 1.9 G/DL (ref 3.5–5.2)
ALLENS TEST: ABNORMAL
ALP SERPL-CCNC: 89 U/L (ref 40–150)
ALT SERPL W/O P-5'-P-CCNC: 7 U/L (ref 10–44)
ANION GAP SERPL CALC-SCNC: 10 MMOL/L (ref 8–16)
ANISOCYTOSIS BLD QL SMEAR: SLIGHT
AST SERPL-CCNC: 38 U/L (ref 10–40)
BASOPHILS NFR BLD: 0 % (ref 0–1.9)
BILIRUB SERPL-MCNC: 0.4 MG/DL (ref 0.1–1)
BUN SERPL-MCNC: 40 MG/DL (ref 8–23)
CALCIUM SERPL-MCNC: 8.4 MG/DL (ref 8.7–10.5)
CHLORIDE SERPL-SCNC: 111 MMOL/L (ref 95–110)
CO2 SERPL-SCNC: 22 MMOL/L (ref 23–29)
CREAT SERPL-MCNC: 1.2 MG/DL (ref 0.5–1.4)
DELSYS: ABNORMAL
DIFFERENTIAL METHOD BLD: ABNORMAL
EOSINOPHIL NFR BLD: 4 % (ref 0–8)
ERYTHROCYTE [DISTWIDTH] IN BLOOD BY AUTOMATED COUNT: 16.8 % (ref 11.5–14.5)
ERYTHROCYTE [SEDIMENTATION RATE] IN BLOOD BY WESTERGREN METHOD: 18 MM/H
EST. GFR  (NO RACE VARIABLE): >60 ML/MIN/1.73 M^2
FIO2: 40
GLUCOSE SERPL-MCNC: 232 MG/DL (ref 70–110)
HCO3 UR-SCNC: 26.5 MMOL/L (ref 24–28)
HCT VFR BLD AUTO: 24.8 % (ref 40–54)
HGB BLD-MCNC: 7.8 G/DL (ref 14–18)
IMM GRANULOCYTES # BLD AUTO: ABNORMAL K/UL (ref 0–0.04)
IMM GRANULOCYTES NFR BLD AUTO: ABNORMAL % (ref 0–0.5)
LYMPHOCYTES NFR BLD: 2 % (ref 18–48)
MAGNESIUM SERPL-MCNC: 2.9 MG/DL (ref 1.6–2.6)
MCH RBC QN AUTO: 28.2 PG (ref 27–31)
MCHC RBC AUTO-ENTMCNC: 31.5 G/DL (ref 32–36)
MCV RBC AUTO: 90 FL (ref 82–98)
METAMYELOCYTES NFR BLD MANUAL: 1 %
MODE: ABNORMAL
MONOCYTES NFR BLD: 6 % (ref 4–15)
MYELOCYTES NFR BLD MANUAL: 1 %
NEUTROPHILS NFR BLD: 85 % (ref 38–73)
NEUTS BAND NFR BLD MANUAL: 1 %
NRBC BLD-RTO: 1 /100 WBC
OHS QRS DURATION: 122 MS
OHS QTC CALCULATION: 449 MS
OVALOCYTES BLD QL SMEAR: ABNORMAL
PCO2 BLDA: 38.8 MMHG (ref 35–45)
PEEP: 5
PH SMN: 7.44 [PH] (ref 7.35–7.45)
PHOSPHATE SERPL-MCNC: 3.3 MG/DL (ref 2.7–4.5)
PLATELET # BLD AUTO: 175 K/UL (ref 150–450)
PMV BLD AUTO: 11.4 FL (ref 9.2–12.9)
PO2 BLDA: 85 MMHG (ref 80–100)
POC BE: 2 MMOL/L
POC SATURATED O2: 97 % (ref 95–100)
POC TCO2: 28 MMOL/L (ref 23–27)
POCT GLUCOSE: 223 MG/DL (ref 70–110)
POCT GLUCOSE: 248 MG/DL (ref 70–110)
POCT GLUCOSE: 254 MG/DL (ref 70–110)
POCT GLUCOSE: 255 MG/DL (ref 70–110)
POCT GLUCOSE: 261 MG/DL (ref 70–110)
POCT GLUCOSE: 343 MG/DL (ref 70–110)
POIKILOCYTOSIS BLD QL SMEAR: SLIGHT
POLYCHROMASIA BLD QL SMEAR: ABNORMAL
POTASSIUM SERPL-SCNC: 5.2 MMOL/L (ref 3.5–5.1)
PROCALCITONIN SERPL IA-MCNC: 0.74 NG/ML
PROCALCITONIN SERPL IA-MCNC: 0.82 NG/ML
PROT SERPL-MCNC: 6 G/DL (ref 6–8.4)
RBC # BLD AUTO: 2.77 M/UL (ref 4.6–6.2)
SAMPLE: ABNORMAL
SITE: ABNORMAL
SODIUM SERPL-SCNC: 143 MMOL/L (ref 136–145)
SP02: 96
VT: 500
WBC # BLD AUTO: 17.84 K/UL (ref 3.9–12.7)

## 2024-10-27 PROCEDURE — 36415 COLL VENOUS BLD VENIPUNCTURE: CPT | Performed by: NURSE PRACTITIONER

## 2024-10-27 PROCEDURE — 84100 ASSAY OF PHOSPHORUS: CPT

## 2024-10-27 PROCEDURE — 63600175 PHARM REV CODE 636 W HCPCS

## 2024-10-27 PROCEDURE — 94761 N-INVAS EAR/PLS OXIMETRY MLT: CPT

## 2024-10-27 PROCEDURE — 82803 BLOOD GASES ANY COMBINATION: CPT

## 2024-10-27 PROCEDURE — 51798 US URINE CAPACITY MEASURE: CPT

## 2024-10-27 PROCEDURE — 95720 EEG PHY/QHP EA INCR W/VEEG: CPT | Mod: ,,, | Performed by: STUDENT IN AN ORGANIZED HEALTH CARE EDUCATION/TRAINING PROGRAM

## 2024-10-27 PROCEDURE — 25000003 PHARM REV CODE 250: Performed by: NURSE PRACTITIONER

## 2024-10-27 PROCEDURE — C9254 INJECTION, LACOSAMIDE: HCPCS

## 2024-10-27 PROCEDURE — 80053 COMPREHEN METABOLIC PANEL: CPT

## 2024-10-27 PROCEDURE — 87205 SMEAR GRAM STAIN: CPT | Performed by: NURSE PRACTITIONER

## 2024-10-27 PROCEDURE — 27200966 HC CLOSED SUCTION SYSTEM

## 2024-10-27 PROCEDURE — 84145 PROCALCITONIN (PCT): CPT

## 2024-10-27 PROCEDURE — 83735 ASSAY OF MAGNESIUM: CPT

## 2024-10-27 PROCEDURE — 25000003 PHARM REV CODE 250: Performed by: STUDENT IN AN ORGANIZED HEALTH CARE EDUCATION/TRAINING PROGRAM

## 2024-10-27 PROCEDURE — 87070 CULTURE OTHR SPECIMN AEROBIC: CPT | Performed by: NURSE PRACTITIONER

## 2024-10-27 PROCEDURE — 25000003 PHARM REV CODE 250

## 2024-10-27 PROCEDURE — 99900026 HC AIRWAY MAINTENANCE (STAT)

## 2024-10-27 PROCEDURE — 20000000 HC ICU ROOM

## 2024-10-27 PROCEDURE — 99291 CRITICAL CARE FIRST HOUR: CPT | Mod: ,,, | Performed by: PSYCHIATRY & NEUROLOGY

## 2024-10-27 PROCEDURE — 85027 COMPLETE CBC AUTOMATED: CPT

## 2024-10-27 PROCEDURE — 51701 INSERT BLADDER CATHETER: CPT

## 2024-10-27 PROCEDURE — 85007 BL SMEAR W/DIFF WBC COUNT: CPT

## 2024-10-27 PROCEDURE — 95714 VEEG EA 12-26 HR UNMNTR: CPT

## 2024-10-27 PROCEDURE — 84145 PROCALCITONIN (PCT): CPT | Mod: 91 | Performed by: PSYCHIATRY & NEUROLOGY

## 2024-10-27 PROCEDURE — 94003 VENT MGMT INPAT SUBQ DAY: CPT

## 2024-10-27 PROCEDURE — 25000242 PHARM REV CODE 250 ALT 637 W/ HCPCS: Performed by: NURSE PRACTITIONER

## 2024-10-27 PROCEDURE — 63600175 PHARM REV CODE 636 W HCPCS: Performed by: NURSE PRACTITIONER

## 2024-10-27 PROCEDURE — 63600175 PHARM REV CODE 636 W HCPCS: Performed by: REGISTERED NURSE

## 2024-10-27 PROCEDURE — 87077 CULTURE AEROBIC IDENTIFY: CPT | Performed by: NURSE PRACTITIONER

## 2024-10-27 PROCEDURE — 63600175 PHARM REV CODE 636 W HCPCS: Performed by: PSYCHIATRY & NEUROLOGY

## 2024-10-27 PROCEDURE — 99900035 HC TECH TIME PER 15 MIN (STAT)

## 2024-10-27 PROCEDURE — 37799 UNLISTED PX VASCULAR SURGERY: CPT

## 2024-10-27 PROCEDURE — 25000003 PHARM REV CODE 250: Performed by: PSYCHIATRY & NEUROLOGY

## 2024-10-27 PROCEDURE — 27100171 HC OXYGEN HIGH FLOW UP TO 24 HOURS

## 2024-10-27 RX ORDER — LEVETIRACETAM 500 MG/5ML
500 INJECTION, SOLUTION, CONCENTRATE INTRAVENOUS EVERY 12 HOURS
Status: COMPLETED | OUTPATIENT
Start: 2024-10-27 | End: 2024-10-27

## 2024-10-27 RX ORDER — LEVETIRACETAM 500 MG/5ML
1500 INJECTION, SOLUTION, CONCENTRATE INTRAVENOUS EVERY 12 HOURS
Status: DISCONTINUED | OUTPATIENT
Start: 2024-10-27 | End: 2024-10-30

## 2024-10-27 RX ORDER — INSULIN ASPART 100 [IU]/ML
10 INJECTION, SOLUTION INTRAVENOUS; SUBCUTANEOUS
Status: DISCONTINUED | OUTPATIENT
Start: 2024-10-27 | End: 2024-10-29

## 2024-10-27 RX ORDER — INSULIN ASPART 100 [IU]/ML
8 INJECTION, SOLUTION INTRAVENOUS; SUBCUTANEOUS
Status: DISCONTINUED | OUTPATIENT
Start: 2024-10-27 | End: 2024-10-27

## 2024-10-27 RX ADMIN — INSULIN ASPART 3 UNITS: 100 INJECTION, SOLUTION INTRAVENOUS; SUBCUTANEOUS at 12:10

## 2024-10-27 RX ADMIN — PSYLLIUM HUSK 1 PACKET: 3.4 POWDER ORAL at 02:10

## 2024-10-27 RX ADMIN — INSULIN GLARGINE 25 UNITS: 100 INJECTION, SOLUTION SUBCUTANEOUS at 08:10

## 2024-10-27 RX ADMIN — CLOBAZAM 20 MG: 10 TABLET ORAL at 08:10

## 2024-10-27 RX ADMIN — LACOSAMIDE 200 MG: 10 INJECTION INTRAVENOUS at 09:10

## 2024-10-27 RX ADMIN — INSULIN ASPART 8 UNITS: 100 INJECTION, SOLUTION INTRAVENOUS; SUBCUTANEOUS at 12:10

## 2024-10-27 RX ADMIN — PIPERACILLIN SODIUM AND TAZOBACTAM SODIUM 4.5 G: 4; .5 INJECTION, POWDER, FOR SOLUTION INTRAVENOUS at 06:10

## 2024-10-27 RX ADMIN — VANCOMYCIN HYDROCHLORIDE 1750 MG: 10 INJECTION, POWDER, LYOPHILIZED, FOR SOLUTION INTRAVENOUS at 10:10

## 2024-10-27 RX ADMIN — FENTANYL CITRATE 50 MCG: 50 INJECTION INTRAMUSCULAR; INTRAVENOUS at 01:10

## 2024-10-27 RX ADMIN — FAMOTIDINE 20 MG: 20 TABLET ORAL at 08:10

## 2024-10-27 RX ADMIN — PIPERACILLIN SODIUM AND TAZOBACTAM SODIUM 4.5 G: 4; .5 INJECTION, POWDER, FOR SOLUTION INTRAVENOUS at 10:10

## 2024-10-27 RX ADMIN — INSULIN ASPART 6 UNITS: 100 INJECTION, SOLUTION INTRAVENOUS; SUBCUTANEOUS at 04:10

## 2024-10-27 RX ADMIN — LEVETIRACETAM 500 MG: 100 INJECTION INTRAVENOUS at 10:10

## 2024-10-27 RX ADMIN — INSULIN ASPART 6 UNITS: 100 INJECTION, SOLUTION INTRAVENOUS; SUBCUTANEOUS at 12:10

## 2024-10-27 RX ADMIN — SILODOSIN 4 MG: 4 CAPSULE ORAL at 08:10

## 2024-10-27 RX ADMIN — INSULIN ASPART 4 UNITS: 100 INJECTION, SOLUTION INTRAVENOUS; SUBCUTANEOUS at 07:10

## 2024-10-27 RX ADMIN — CLOBAZAM 20 MG: 10 TABLET ORAL at 09:10

## 2024-10-27 RX ADMIN — PSYLLIUM HUSK 1 PACKET: 3.4 POWDER ORAL at 09:10

## 2024-10-27 RX ADMIN — LEVETIRACETAM 1500 MG: 100 INJECTION INTRAVENOUS at 08:10

## 2024-10-27 RX ADMIN — LACOSAMIDE 200 MG: 10 INJECTION INTRAVENOUS at 10:10

## 2024-10-27 RX ADMIN — HEPARIN SODIUM 5000 UNITS: 5000 INJECTION INTRAVENOUS; SUBCUTANEOUS at 05:10

## 2024-10-27 RX ADMIN — PROPOFOL 10 MCG/KG/MIN: 10 INJECTION, EMULSION INTRAVENOUS at 01:10

## 2024-10-27 RX ADMIN — INSULIN ASPART 10 UNITS: 100 INJECTION, SOLUTION INTRAVENOUS; SUBCUTANEOUS at 03:10

## 2024-10-27 RX ADMIN — INSULIN ASPART 4 UNITS: 100 INJECTION, SOLUTION INTRAVENOUS; SUBCUTANEOUS at 03:10

## 2024-10-27 RX ADMIN — LEVETIRACETAM 1000 MG: 100 INJECTION INTRAVENOUS at 07:10

## 2024-10-27 RX ADMIN — HEPARIN SODIUM 5000 UNITS: 5000 INJECTION INTRAVENOUS; SUBCUTANEOUS at 09:10

## 2024-10-27 RX ADMIN — INSULIN ASPART 2 UNITS: 100 INJECTION, SOLUTION INTRAVENOUS; SUBCUTANEOUS at 08:10

## 2024-10-27 RX ADMIN — INSULIN ASPART 6 UNITS: 100 INJECTION, SOLUTION INTRAVENOUS; SUBCUTANEOUS at 07:10

## 2024-10-27 RX ADMIN — PSYLLIUM HUSK 1 PACKET: 3.4 POWDER ORAL at 08:10

## 2024-10-27 RX ADMIN — INSULIN ASPART 10 UNITS: 100 INJECTION, SOLUTION INTRAVENOUS; SUBCUTANEOUS at 08:10

## 2024-10-27 RX ADMIN — ATORVASTATIN CALCIUM 40 MG: 40 TABLET, FILM COATED ORAL at 08:10

## 2024-10-27 RX ADMIN — HEPARIN SODIUM 5000 UNITS: 5000 INJECTION INTRAVENOUS; SUBCUTANEOUS at 02:10

## 2024-10-27 RX ADMIN — FENTANYL CITRATE 50 MCG: 50 INJECTION INTRAMUSCULAR; INTRAVENOUS at 04:10

## 2024-10-27 NOTE — ASSESSMENT & PLAN NOTE
84 y.o. 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 Given K-centra at OSH.     Taken to OR 10/18 for R frontal IPH evac via eyebrow supraorbital craniotomy. Unfortunately, interval development of large SDH on post op scan, taken back to OR emergently for acute SDH evacuation.    Imaging:  -CTH OSH 10/17: 5cm R frontal ICH with 7mm MLS and some intraventricular blood in posterior lateral vent   -CT Csp OSH 10/17: no acute processes   -rCTH 10/18: grossly stable   -CTA 10/18: post op clot evac with new holoconvexity R SDH, 12 mm MLS   -Post op CTH 10/18: s/p SDH evacuation with appropriate evac, CTA neg   -CTH 10/21: new/increased hyperdensity in the resection cavity without worsening mass effect or midline shift   -rCTH 10/21: stable    S/p R SDH evac on 10/18    Plan:  Patient admitted to ICU on telemetry, q1h neuro checks  Hold anti-plt/coag medications. Given K-centra for reversal at OSH. INR 1.2 on arrival  MRI brain w wo ordered without clear underlying mass, no significant amyloid  SBP <140   Na >135  Keppra 1g q12, vimpat, onfi and prop added  LEANDRO management per NCC  Transfuse RBC for goal Hgb >7  No HOB restrictions  Drains removed with stitch  WTE plan per NCC; GoC per NCC  Continue to monitor clinically, notify NSGY immediately with any changes in neuro status    Plan discussed with Dr. Condon    Dispo: admitted to ICU

## 2024-10-27 NOTE — PLAN OF CARE
"[facility] Care Plan  POC reviewed with Percy Ramirez and family at 1400. Patient and Family verbalized understanding. Questions and concerns addressed. No acute events today. Pt progressing toward goals. Will continue to monitor. See below and flowsheets for full assessment and VS info.     -Propofol continued @ 10mcg/kg/min    Is this a stroke patient? no    Neuro:  Federico Coma Scale  Best Eye Response: 1-->(E1) none  Best Motor Response: 3-->(M3) flexion to pain  Best Verbal Response: 1-->(V1) none  [place] Coma Scale Score: 5  Assessment Qualifiers: patient intubated, patient chemically sedated or paralyzed  Pupil PERRLA: yes     24 hr Temp:  [97.3 °F (36.3 °C)-99.5 °F (37.5 °C)]     CV:   Rhythm: atrial rhythm  BP goals:   SBP < 160  MAP > 65    Resp:      Vent Mode: A/C  Set Rate: 16 BPM  Oxygen Concentration (%): 40  Vt Set: 500 mL  PEEP/CPAP: 5 cmH20  Pressure Support: 5 cmH20    Plan: wean to extubate    GI/:     Diet/Nutrition Received: tube feeding  Last Bowel Movement: 10/27/24  Voiding Characteristics: unable to void    Intake/Output Summary (Last 24 hours) at 10/27/2024 1653  Last data filed at 10/27/2024 1600  Gross per 24 hour   Intake 2087.05 ml   Output 2200 ml   Net -112.95 ml     Unmeasured Output  Urine Occurrence: 1  Stool Occurrence: 1  Pad Count: 1    Labs/Accuchecks:  Recent Labs   Lab 10/27/24  0030   WBC 17.84*   RBC 2.77*   HGB 7.8*   HCT 24.8*         Recent Labs   Lab 10/27/24  0030      K 5.2*   CO2 22*   *   BUN 40*   CREATININE 1.2   ALKPHOS 89   ALT 7*   AST 38   BILITOT 0.4    No results for input(s): "PROTIME", "INR", "APTT", "HEPANTIXA" in the last 168 hours. No results for input(s): "CPK", "CPKMB", "TROPONINI", "MB" in the last 168 hours.    Electrolytes: N/A - electrolytes WDL  Accuchecks: Q4H    Gtts:   D10W   Intravenous Continuous PRN        propofol  10 mcg/kg/min Intravenous Continuous 5 mL/hr at 10/27/24 1600 10 mcg/kg/min at 10/27/24 1600 "       LDA/Wounds:    Mykel Risk Assessment  Sensory Perception: 1-->completely limited  Moisture: 3-->occasionally moist  Activity: 1-->bedfast  Mobility: 1-->completely immobile  Nutrition: 3-->adequate  Friction and Shear: 2-->potential problem  Mykel Score: 11    Is your mykel score 12 or less? yes heel boots on      Nurses Note -- 4 Eyes    Is there altered skin present?  No  Second RN/Staff Member:  Ahsan RN      Restraints:       [facility]

## 2024-10-27 NOTE — ASSESSMENT & PLAN NOTE
Cr 1.8 on initial presentation to ED and now 2.2    Creatine stable for now. BMP reviewed- noted Estimated Creatinine Clearance: 49.9 mL/min (based on SCr of 1.2 mg/dL). according to latest data. Based on current GFR, CKD stage is stage 3 - GFR 30-59.  Monitor UOP and serial BMP and adjust therapy as needed. Renally dose meds. Avoid nephrotoxic medications and procedures.

## 2024-10-27 NOTE — PROGRESS NOTES
Pharmacokinetic Initial Assessment: IV Vancomycin    Assessment/Plan:    Initiate intravenous vancomycin with loading dose of 1750 mg once followed by a maintenance dose of vancomycin 1250mg IV every 24 hours  Desired empiric serum trough concentration is 15 to 20 mcg/mL  Draw vancomycin trough level 60 min prior to third dose on 10/29 at approximately 1030  Pharmacy will continue to follow and monitor vancomycin.      Please contact pharmacy at extension [phone] with any questions regarding this assessment.     Thank you for the consult,   Prashanth Winters       Patient brief summary:  Percy Ramirez is a 84 y.o. male initiated on antimicrobial therapy with IV Vancomycin for treatment of suspected lower respiratory infection    Drug Allergies:   Review of patient's allergies indicates:   Allergen Reactions    Aricept odt [donepezil] Diarrhea and Nausea And Vomiting    Codeine Nausea Only     weak    Ranexa [ranolazine]        Actual Body Weight:   83.5 kg    Renal Function:   Estimated Creatinine Clearance: 49.9 mL/min (based on SCr of 1.2 mg/dL).,     Dialysis Method (if applicable):  N/A    CBC (last 72 hours):  Recent Labs   Lab Result Units 10/25/24  0014 10/26/24  0032 10/27/24  0030   WBC K/uL 6.89 6.46 17.84*   Hemoglobin g/dL 7.6* 7.2* 7.8*   Hematocrit % 24.4* 23.9* 24.8*   Platelets K/uL 143* 140* 175   Gran % % 79.3* 84.0* 85.0*   Lymph % % 4.7* 3.0* 2.0*   Mono % % 11.3 8.0 6.0   Eosinophil % % 1.3 2.0 4.0   Basophil % % 0.0 0.0 0.0   Differential Method  Manual Manual Manual       Metabolic Panel (last 72 hours):  Recent Labs   Lab Result Units 10/25/24  0014 10/26/24  0032 10/27/24  0030   Sodium mmol/L 146* 140 143   Potassium mmol/L 4.8 5.0 5.2*   Chloride mmol/L 115* 111* 111*   CO2 mmol/L 22* 22* 22*   Glucose mg/dL 165* 208* 232*   BUN mg/dL 39* 41* 40*   Creatinine mg/dL 1.3 1.2 1.2   Albumin g/dL 1.9* 1.8* 1.9*   Total Bilirubin mg/dL 0.3 0.3 0.4   Alkaline Phosphatase U/L 69 74 89   AST U/L 26 30  "38   ALT U/L 8* <5* 7*   Magnesium mg/dL 3.1* 3.3* 2.9*   Phosphorus mg/dL 3.7 3.9 3.3       Drug levels (last 3 results):  No results for input(s): "VANCOMYCINRA", "VANCORANDOM", "VANCOMYCINPE", "VANCOPEAK", "VANCOMYCINTR", "VANCOTROUGH" in the last 72 hours.    Microbiologic Results:  Microbiology Results (last 7 days)       Procedure Component Value Units Date/Time    Culture, Respiratory with Gram Stain [[id number]]     Order Status: No result Specimen: Respiratory from Endotracheal Aspirate     Blood culture [[id number]] Collected: 10/18/24 0335    Order Status: Completed Specimen: Blood from Peripheral, Foot, Right Updated: 10/23/24 0612     Blood Culture, Routine No growth after 5 days.    Blood culture [[id number]] Collected: 10/18/24 0345    Order Status: Completed Specimen: Blood from Peripheral, Hand, Left Updated: 10/23/24 0612     Blood Culture, Routine No growth after 5 days.            "

## 2024-10-27 NOTE — SIGNIFICANT EVENT
2220: Contacted by Epilepsy on-call, Dr. Carbajal. Notified that patient began having seizures again following cessation of propofol infusion. Recommended resuming propofol @ 10 for now and attempt to wean again tomorrow.

## 2024-10-27 NOTE — ASSESSMENT & PLAN NOTE
LEANDRO on CKD, Cr 1.8 on initial presentation to ED    Recent Labs     10/25/24  0014 10/26/24  0032 10/27/24  0030   CREATININE 1.3 1.2 1.2   EGFRNORACEVR 54.8* >60.0 >60.0      Plan  - LEANDRO is resolved  - Avoid nephrotoxins and renally dose meds for GFR listed above  - Monitor urine output, serial BMP, and adjust therapy as needed  - Q1H I&Os

## 2024-10-27 NOTE — ASSESSMENT & PLAN NOTE
Initial presentation c/f HHS w glucose > 600. Beta hydroxybutyrate and urine ketones negative. Serum CO2 22. Given SQ insulin at OSH.  On arrival to [facility], . Hb A1c 9.9% on admit.     Patient's FSGs are uncontrolled due to hyperglycemia on current medication regimen.  Last A1c reviewed-   Lab Results   Component Value Date    LABA1C 6.8 10/09/2017    HGBA1C 9.9 (H) 10/17/2024     Most recent fingerstick glucose reviewed-   Recent Labs   Lab 10/27/24  0028 10/27/24  0405 10/27/24  0752 10/27/24  1247   POCTGLUCOSE 254* 261* 223* 343*     Current correctional scale  Medium  Increase anti-hyperglycemic dose as follows-   Antihyperglycemics (From admission, onward)      Start     Stop Route Frequency Ordered    10/27/24 1600  insulin aspart U-100 pen 10 Units         -- SubQ 6 times per day 10/27/24 1312    10/25/24 1133  insulin aspart U-100 pen 0-10 Units         -- SubQ Every 4 hours PRN 10/25/24 1036    10/25/24 1045  insulin glargine U-100 (Lantus) pen 25 Units         -- SubQ Daily 10/25/24 1036    10/22/24 1400  insulin regular in 0.9 % NaCl 100 unit/100 mL (1 unit/mL) infusion        Question Answer Comment   Insulin Rate Adjustment (DO NOT MODIFY ANSWER) [email or web address]    Enter initial dose from Infusion Protocol Chart (Units/hr): 1.5        10/25/24 1431 IV Continuous 10/22/24 1252    10/21/24 1145  insulin regular in 0.9 % NaCl 100 unit/100 mL (1 unit/mL) infusion        Question:  Enter initial dose from Infusion Protocol Chart (Units/hr):  Answer:  1.65    10/21/24 1343 IV Continuous 10/21/24 1042          Hold Oral hypoglycemics while patient is in the hospital.

## 2024-10-27 NOTE — PROCEDURES
ICU EEG/VIDEO MONITORING REPORT    Percy Ramirez  [id number]  1940    DATE OF SERVICE: 10/26-27/2024    DATE OF ADMISSION: 10/17/2024  6:59 PM    ADMITTING PROVIDER: Kimo Johnson DO    METHODOLOGY   Electroencephalographic (EEG) recording is with electrodes placed according to the International 10-20 placement system.  Thirty two (32) channels of digital signal are simultaneously recorded from the scalp and may include EKG, EMG, and/or eye monitors.   Recording band pass was 0.1 to 512 hz.  Digital video recording of the patient is simultaneously recorded with the EEG.  The nursing staff report clinical symptoms and may press an event button when the patient has symptoms of clinical interest to the treating physicians.  EEG and video recording is stored and archived in digital format.  The entire recording is visually reviewed and the times identified by computer analysis as being spikes or seizures are reviewed again.  Activation procedures which include photic stimulation, hyperventilation and instructing patients to perform simple task are done in selected patients.   Compresses spectral analysis (CSA) is also performed on the activity recorded from each individual channel.  This is displayed as a power display of frequencies from 0 to 30 Hz over time.   The CSA analysis is done and displayed continuously.  This is reviewed for asymmetries in power between homologous areas of the scalp and for presence of changes in power which canbe seen when seizures occur.  Sections of suspected abnormalities on the CSA is then compared with the original EEG recording.     [facility] software was also utilized in the review of this study.  This software suite analyzes the EEG recording in multiple domains.  Coherence and rhythmicity is computed to identify EEG sections which may contain organized seizures.  Each channel undergoes analysis to detect presence of spike and sharp waves which have special and morphological  characteristic of epileptic activity.  The routine EEG recording is converted from spacial into frequency domain.  This is then displayed comparing homologous areas to identify areas of significant asymmetry.  Algorithm to identify non-cortically generated artifact is used to separate eye movement, EMG and other artifact from the EEG.      Recording Times  Start on 10/26/2024, 09:07  Stop on 10/27/2024, 07:00    A total of 21 hours and 53 minutes of EEG was recorded.    EEG FINDINGS - the study is done under tapering sedation: Propofol 50->40->30->20->10->off; restart @ 10 mKm  Background activity:     At onset with Propofol 50 mKm, burst suppression pattern is seen with bursts comprising of sharply contoured delta-theta (3-5 Hz) activity with potential epileptiform sharp waves noted on the right.  Periods of suppression are noted to last 3-7 seconds.    With decreasing dose of Propofol, the periods of suppression decreases and low amplitude (<25uV) continuous background of sharply contoured delta-theta activity is seen.     No posterior dominant rhythm is seen.    Sleep:   Not seen.    Activation procedures:   Not done    Abnormal activity:   With decreasing sedation, bilateral independent potential epileptiform sharp waves are seen.    After Propofol was discontinued, one electrographic seizure of right (T4/T6) onset was seen    With subsequent restart of Propofol, no further seizures were seen until end of study.    EKG:   Irregular rhythm seen.    IMPRESSION:   This C-EEG done under tapering sedation is abnormal due to:  1) one electrographic seizure of right onset seen after Propofol was discontinued  2) burst suppression pattern seen at onset with potential epileptiform activity noted in the bursts as described  3) bilateral  independent potential epileptiform activity seen with tapering sedation, suggestive of underlying epileptiform potential  4) severe generalized slowing seen with tapering sedation,  suggestive of severe diffuse cerebral dysfunction.  Irregular heart rhythm noted on EKG.    CLINICAL CORRELATION IS RECOMMENDED.  Findings were periodically conveyed to NCC team.    Diamond Carbajal MD, KOMAL(), TATI, MARI.  Neurology-Epilepsy.  [facility].

## 2024-10-27 NOTE — PLAN OF CARE
"[facility] Care Plan    POC reviewed with Percy Ramirez at 0300. Patient  unable to verbalize understanding. . Pt progressing toward goals. Will continue to monitor. See below and flowsheets for full assessment and VS info.     -Propofol restarted at 10 mcg/kg/min  non titrating  -Fentanyl 50 mcg IV given x1 for mcg/kg/min x1  -Straight cath x2  -Flexi output 200 ml      Is this a stroke patient? yes- Stroke booklet reviewed with patient and family, risk factors identified for patient and stroke booklet remains at bedside for ongoing education.     Care individualization: fans on intermittently     Neuro:  [place] Coma Scale  Best Eye Response: 1-->(E1) none  Best Motor Response: 3-->(M3) flexion to pain  Best Verbal Response: 1-->(V1) none  [place] Coma Scale Score: 5  Assessment Qualifiers: patient intubated  Pupil PERRLA: yes     24 hr Temp:  [96.1 °F (35.6 °C)-99.9 °F (37.7 °C)]     CV:   Rhythm: atrial rhythm  BP goals:   SBP < 160  MAP > 65    Resp:      Vent Mode: A/C  Set Rate: 16 BPM  Oxygen Concentration (%): 40  Vt Set: 500 mL  PEEP/CPAP: 5 cmH20  Pressure Support: 5 cmH20    Plan: N/A    GI/:     Diet/Nutrition Received: tube feeding  Last Bowel Movement: 10/26/24  Voiding Characteristics: external catheter    Intake/Output Summary (Last 24 hours) at 10/27/2024 0642  Last data filed at 10/27/2024 0605  Gross per 24 hour   Intake 1930.49 ml   Output 2450 ml   Net -519.51 ml     Unmeasured Output  Urine Occurrence: 1  Stool Occurrence: 1  Pad Count: 1    Labs/Accuchecks:  Recent Labs   Lab 10/27/24  0030   WBC 17.84*   RBC 2.77*   HGB 7.8*   HCT 24.8*         Recent Labs   Lab 10/27/24  0030      K 5.2*   CO2 22*   *   BUN 40*   CREATININE 1.2   ALKPHOS 89   ALT 7*   AST 38   BILITOT 0.4    No results for input(s): "PROTIME", "INR", "APTT", "HEPANTIXA" in the last 168 hours. No results for input(s): "CPK", "CPKMB", "TROPONINI", "MB" in the last 168 hours.    Electrolytes: " Contraindicated  Accuchecks: Q4H    Gtts:   D10W   Intravenous Continuous PRN        propofol  10 mcg/kg/min Intravenous Continuous 5 mL/hr at 10/27/24 0505 10 mcg/kg/min at 10/27/24 0505       LDA/Wounds:    Mykel Risk Assessment  Sensory Perception: 2-->very limited  Moisture: 3-->occasionally moist  Activity: 1-->bedfast  Mobility: 2-->very limited  Nutrition: 3-->adequate  Friction and Shear: 3-->no apparent problem  Mykel Score: 14  Is your mykel score 12 or less? no    Nurses Note -- 4 Eyes    Is there altered skin present?  No  Second RN/Staff Member:  RADHA Britton      Restraints:   Restraint Order  Length of Order: Order good for next 24 hours or when removed.  Date that the current order will : 10/27/24  Time that the current order will :   Order Upon Application: Yes    [facility]

## 2024-10-27 NOTE — PROGRESS NOTES
[facility] - Neuro Critical Care  Neurocritical Care  Progress Note    Admit Date: 10/17/2024  Service Date: 10/27/2024  Length of Stay: 10    Subjective:     Chief Complaint: Traumatic right-sided intracerebral hemorrhage without loss of consciousness    History of Present Illness: Percy Ramirez is a 83M PMHx of HTN, HLD, DM2, CAD s/p CABG x2, Afib on Coumadin, ILD on 4L of O2, HFrEF (45%), presenting as a transfer for a large traumatic R frontal IPH. Pt initially presented to [facility] ED yesterday ago after mechanical fall and was found to have 4mm parafalcine SDH. Repeat CTH was stable. Pt was discharged home on keppra and instructed to hold his blood thinners. On 10/17/24 pt was lethargic and fell. He was brought back to  ED. Blood glucose was >600 and he was febrile (101F). CTH revealed 5cm R frontal IPH with 7mm MLS. Kcentra, 10mg vitamin K, and 3% HTS bolus were given. He was transferred to [facility] for higher level of care.     Hospital Course: 10/19/2024: POD #1 from both his MI LS as well as his right subdural/intraparenchymal hemorrhage evacuation.  He is still intubated and although sedation is off, he is minimally responsive. Coreg doubled. NG tube placed and tube feeds started. Still on insulin drip.  10/20/2024: POD #2 s/p crani for SDH evacuation. Leukocytosis noted post-op, no accompanying fever/chills. Straight cath x1. Tachycardia responding to IVF boluses. Tube feeds started, will titrate to goal. Insulin gtt continued, plans to transition to subQ tomorrow.  10/21/2021: POD#3. EEG placed this morning, revealing multiple r-sided seizures. Keppra load completed and maintenance dose increased. Concern for LUE no longer WD, stat head CT showing new/increased hyperdensity in resection bed w/o worsening mass effect or MLS. 1L LR given for LEANDRO. Will continue to monitor Na.   10/22/24: POD#4. Addl seizures despite keppra load. Vimpat and propofol started yesterday with significant reduction in number.  1U PRBC transfused overnight. Required levo for short period of time after initiation of propofol.  10/23/24: POD#5.  Patient continues to be hyponatremic.  Free water volume increased.  Patient with total of 11 seizures yesterday.  Vimpat and propofol dosing increased with improvement.  10/24/24: POD#6. NGT adjusted overnight. No additional seizures on current AEM regimen. Platelets and Hgb decreasing, will order peripheral smear to assess for HIT  10/25/2024 POD#7. Addl seizure, Onfi added to AEM regimen. Drains removed by NSGY. Will attempt to wean propofol tomorrow.   10/26/2024 POD#8. MRI complete yx. Weaning propofol today.   10/27/2024 POD#9. Patient began having seizures again overnight following cessation of propofol gtt. Resumed propofol at 10cc/hr for now, increased Keppra dose, and plan to attempt to wean again tomorrow. Persistent hyperglycemia on tube feeds, optimizing insulin regimen. Leukocytosis pending respiratory cultures.    Interval History:  see hospital course    Review of Systems   Unable to perform ROS: Intubated       Objective:     Vitals:  Temp: 98.8 °F (37.1 °C)  Pulse: 88  Rhythm: atrial rhythm  BP: (!) 144/64  MAP (mmHg): 92  Resp: (!) 21  SpO2: 98 %  Oxygen Concentration (%): 40  Vent Mode: A/C  Set Rate: 16 BPM  Vt Set: 500 mL  PEEP/CPAP: 5 cmH20  Peak Airway Pressure: 15 cmH20  Mean Airway Pressure: 8.2 cmH20  Plateau Pressure: 13 cmH20    Temp  Min: 97.3 °F (36.3 °C)  Max: 99.9 °F (37.7 °C)  Pulse  Min: 77  Max: 109  BP  Min: 124/58  Max: 172/80  MAP (mmHg)  Min: 83  Max: 115  Resp  Min: 18  Max: 34  SpO2  Min: 94 %  Max: 99 %  Oxygen Concentration (%)  Min: 40  Max: 40    10/26 0701 - 10/27 0700  In: 1914.6 [I.V.:214.6]  Out: 2450 [Urine:2250]   Unmeasured Output  Urine Occurrence: 1  Stool Occurrence: 1  Pad Count: 1        Physical Exam  Vitals and nursing note reviewed.   Constitutional:       General: He is not in acute distress.     Appearance: He is not toxic-appearing.       Interventions: He is sedated and intubated.   HENT:      Mouth/Throat:      Comments: ET/OGT tube in place  Cardiovascular:      Rate and Rhythm: Normal rate.   Pulmonary:      Effort: No respiratory distress. He is intubated.      Breath sounds: Normal breath sounds.   Abdominal:      General: There is no distension.      Palpations: Abdomen is soft.   Skin:     General: Skin is warm and dry.   Neurological:      GCS: GCS eye subscore is 1. GCS verbal subscore is 1. GCS motor subscore is 3.      Comments: Unable to obtain reliable exam due to level of sedation          Medications:  [id number]  propofol, Last Rate: 10 mcg/kg/min (10/27/24 1300)    Scheduledatorvastatin, 40 mg, Daily  cloBAZam, 20 mg, Q12H  famotidine, 20 mg, Daily  heparin (porcine), 5,000 Units, Q8H  insulin aspart U-100, 10 Units, 6 times per day  insulin glargine U-100, 25 Units, Daily  lacosamide (Vimpat) IV orderable, 200 mg, Q12H  levETIRAcetam (Keppra) IV (PEDS and ADULTS), 1,500 mg, Q12H  piperacillin-tazobactam (Zosyn) IV (PEDS and ADULTS) (extended infusion is not appropriate), 4.5 g, Q8H  psyllium husk (aspartame), 1 packet, TID  silodosin, 4 mg, Daily  [START ON 10/28/2024] vancomycin (VANCOCIN) IV (PEDS and ADULTS), 15 mg/kg, Q24H    PRNacetaminophen, 650 mg, Q6H PRN  D10W, , Continuous PRN  dextrose 10%, 12.5 g, PRN  dextrose 10%, 25 g, PRN  fentaNYL, 50 mcg, Q1H PRN  glucagon (human recombinant), 1 mg, PRN  hydrALAZINE, 10 mg, Q4H PRN  insulin aspart U-100, 0-10 Units, Q4H PRN  labetalol, 10 mg, Q4H PRN  magnesium oxide, 800 mg, PRN  magnesium oxide, 800 mg, PRN  ondansetron, 4 mg, Q8H PRN  potassium bicarbonate, 35 mEq, PRN  potassium bicarbonate, 50 mEq, PRN  potassium bicarbonate, 60 mEq, PRN  potassium, sodium phosphates, 2 packet, PRN  potassium, sodium phosphates, 2 packet, PRN  potassium, sodium phosphates, 2 packet, PRN  vancomycin - pharmacy to dose, , pharmacy to manage frequency      Today I personally reviewed  pertinent medications, lines/drains/airways, imaging, laboratory results, notably:    Diet  Diet NPO Except for: Medication  Diet NPO Except for: Medication    Recent Labs   Lab 10/27/24  0030   WBC 17.84*   RBC 2.77*   HGB 7.8*   HCT 24.8*      MCV 90   MCH 28.2   MCHC 31.5*         Assessment/Plan:     Neuro  * Traumatic right-sided intracerebral hemorrhage without loss of consciousness  83M PMHx of HTN, HLD, DM2, CAD s/p CABG x2, Afib on Coumadin, ILD on 4L of O2, HFrEF (45%), presenting as a transfer for a large traumatic R frontal IPH reversed with Kcentra and vitamin K with repeat INR 1.2.    Admit to NCC  q1h neuro checks, vitals, and I&O's  CBC, CMP, mag, and phos daily   Coags, TSH, A1c, lipid panel, UA  Echo, EKG, CXR  SBP <160  Na goals > 145.  PRN boluses of 3% HTS if sodium less than 145  Neurosurgery following, drains removed   NPO   SCDs; heparin for VTE prophylaxis  PT/OT/SLP    Altered mental status  See primary problem    Intracranial hemorrhage  See primary problem    Subdural hematoma  See primary problem    Seizures  EEG placed morning of 10/21 due to delay in return to expected LOC  -Multiple seizures throughout the day  -Has been loaded with keppra and vimpat  -Addl seizure evening of 10/24, Onfi added on.   -Current AED regimen:   - Keppra 1500 BID   - Vimpat 200 BID   - Onfi 20 BID  -Propofol gtt at 10cc/hr today due to seizure over night after prop cessation overnight 10/26, plan to wean tomorrow    Psychiatric  Major depressive disorder, recurrent episode, mild  Restart home SSRI once able to take p.o.    Pulmonary  ILD (interstitial lung disease)  ILD on 4L of O2 at home    SpO2 goal > 88%  Duo nebs and tiotropium  Currently intubated    Cardiac/Vascular  Chronic combined systolic and diastolic heart failure  06/29/2024 echo with EF 45%, was previously 30-40%    Repeat echo shows his EF is still 45%  HDS    Persistent atrial fibrillation  In atrial fibrillation on admit  Hold  Coumadin in setting of acute intraparenchymal hemorrhage  Heparin subcu for VTE prophylaxis    Hyperlipidemia LDL goal <100  Lipid panel showed low LDL, low HDL, low total cholesterol  Atorvastatin    Coronary artery disease  S/p CABG x2 in 2004  Patient has reportedly not been on Plavix recently because of his Coumadin      Benign hypertension with chronic kidney disease, stage III  SBP goal < 160  PRN labetalol and hydralazine   Holding scheduled antihypertensives at this time while on propofol gtt      Renal/  LEANDRO (acute kidney injury)  LEANDRO on CKD, Cr 1.8 on initial presentation to ED    Recent Labs     10/25/24  0014 10/26/24  0032 10/27/24  0030   CREATININE 1.3 1.2 1.2   EGFRNORACEVR 54.8* >60.0 >60.0      Plan  - LEANDRO is resolved  - Avoid nephrotoxins and renally dose meds for GFR listed above  - Monitor urine output, serial BMP, and adjust therapy as needed  - Q1H I&Os    Chronic kidney disease, stage 3a  Cr 1.8 on initial presentation to ED and now 2.2    Creatine stable for now. BMP reviewed- noted Estimated Creatinine Clearance: 49.9 mL/min (based on SCr of 1.2 mg/dL). according to latest data. Based on current GFR, CKD stage is stage 3 - GFR 30-59.  Monitor UOP and serial BMP and adjust therapy as needed. Renally dose meds. Avoid nephrotoxic medications and procedures.    Oncology  Leukocytosis  Elevated WBC 17.84 today. Procal elevated at 0.82.  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 Beta hydroxybutyrate and urine ketones negative. Serum CO2 22. Given SQ insulin at OSH.  On arrival to [facility], . Hb A1c 9.9% on admit.     Patient's FSGs are uncontrolled due to hyperglycemia on current medication regimen.  Last A1c reviewed-   Lab Results   Component Value Date    LABA1C 6.8 10/09/2017    HGBA1C 9.9 (H) 10/17/2024     Most recent fingerstick glucose reviewed-    Recent Labs   Lab 10/27/24  0028 10/27/24  0405 10/27/24  0752 10/27/24  1247   POCTGLUCOSE 254* 261* 223* 343*     Current correctional scale  Medium  Increase anti-hyperglycemic dose as follows-   Antihyperglycemics (From admission, onward)      Start     Stop Route Frequency Ordered    10/27/24 1600  insulin aspart U-100 pen 10 Units         -- SubQ 6 times per day 10/27/24 1312    10/25/24 1133  insulin aspart U-100 pen 0-10 Units         -- SubQ Every 4 hours PRN 10/25/24 1036    10/25/24 1045  insulin glargine U-100 (Lantus) pen 25 Units         -- SubQ Daily 10/25/24 1036    10/22/24 1400  insulin regular in 0.9 % NaCl 100 unit/100 mL (1 unit/mL) infusion        Question Answer Comment   Insulin Rate Adjustment (DO NOT MODIFY ANSWER) [email or web address]    Enter initial dose from Infusion Protocol Chart (Units/hr): 1.5        10/25/24 1431 IV Continuous 10/22/24 1252    10/21/24 1145  insulin regular in 0.9 % NaCl 100 unit/100 mL (1 unit/mL) infusion        Question:  Enter initial dose from Infusion Protocol Chart (Units/hr):  Answer:  1.65    10/21/24 1343 IV Continuous 10/21/24 1042          Hold Oral hypoglycemics while patient is in the hospital.      Other  Obstructive sleep apnea treated with BiPAP  Currently intubated.  Daily ABG  Daily CXR          The patient is being Prophylaxed for:  Venous Thromboembolism with: Mechanical or Chemical  Stress Ulcer with: H2B  Ventilator Pneumonia with: chlorhexidine oral care    Activity Orders            Turn patient starting at 10/18 0000    Diet NPO Except for: Medication: NPO starting at 10/17 6602          Full Code    Khris Cote MD  Neurocritical Care  [facility] - Neuro Critical Care

## 2024-10-27 NOTE — ASSESSMENT & PLAN NOTE
Elevated WBC 17.84 today. Procal elevated at 0.82.  - Repeat procal pending  - Vanc/Zosyn  - Respiratory cx pending

## 2024-10-27 NOTE — ASSESSMENT & PLAN NOTE
SBP goal < 160  PRN labetalol and hydralazine   Holding scheduled antihypertensives at this time while on propofol gtt

## 2024-10-27 NOTE — SUBJECTIVE & OBJECTIVE
Interval History:  see hospital course    Review of Systems   Unable to perform ROS: Intubated       Objective:     Vitals:  Temp: 98.8 °F (37.1 °C)  Pulse: 88  Rhythm: atrial rhythm  BP: (!) 144/64  MAP (mmHg): 92  Resp: (!) 21  SpO2: 98 %  Oxygen Concentration (%): 40  Vent Mode: A/C  Set Rate: 16 BPM  Vt Set: 500 mL  PEEP/CPAP: 5 cmH20  Peak Airway Pressure: 15 cmH20  Mean Airway Pressure: 8.2 cmH20  Plateau Pressure: 13 cmH20    Temp  Min: 97.3 °F (36.3 °C)  Max: 99.9 °F (37.7 °C)  Pulse  Min: 77  Max: 109  BP  Min: 124/58  Max: 172/80  MAP (mmHg)  Min: 83  Max: 115  Resp  Min: 18  Max: 34  SpO2  Min: 94 %  Max: 99 %  Oxygen Concentration (%)  Min: 40  Max: 40    10/26 0701 - 10/27 0700  In: 1914.6 [I.V.:214.6]  Out: 2450 [Urine:2250]   Unmeasured Output  Urine Occurrence: 1  Stool Occurrence: 1  Pad Count: 1        Physical Exam  Vitals and nursing note reviewed.   Constitutional:       General: He is not in acute distress.     Appearance: He is not toxic-appearing.      Interventions: He is sedated and intubated.   HENT:      Mouth/Throat:      Comments: ET/OGT tube in place  Cardiovascular:      Rate and Rhythm: Normal rate.   Pulmonary:      Effort: No respiratory distress. He is intubated.      Breath sounds: Normal breath sounds.   Abdominal:      General: There is no distension.      Palpations: Abdomen is soft.   Skin:     General: Skin is warm and dry.   Neurological:      GCS: GCS eye subscore is 1. GCS verbal subscore is 1. GCS motor subscore is 3.      Comments: Unable to obtain reliable exam due to level of sedation          Medications:  [id number]  propofol, Last Rate: 10 mcg/kg/min (10/27/24 1300)    Scheduledatorvastatin, 40 mg, Daily  cloBAZam, 20 mg, Q12H  famotidine, 20 mg, Daily  heparin (porcine), 5,000 Units, Q8H  insulin aspart U-100, 10 Units, 6 times per day  insulin glargine U-100, 25 Units, Daily  lacosamide (Vimpat) IV orderable, 200 mg, Q12H  levETIRAcetam (Keppra) IV (PEDS and  ADULTS), 1,500 mg, Q12H  piperacillin-tazobactam (Zosyn) IV (PEDS and ADULTS) (extended infusion is not appropriate), 4.5 g, Q8H  psyllium husk (aspartame), 1 packet, TID  silodosin, 4 mg, Daily  [START ON 10/28/2024] vancomycin (VANCOCIN) IV (PEDS and ADULTS), 15 mg/kg, Q24H    PRNacetaminophen, 650 mg, Q6H PRN  D10W, , Continuous PRN  dextrose 10%, 12.5 g, PRN  dextrose 10%, 25 g, PRN  fentaNYL, 50 mcg, Q1H PRN  glucagon (human recombinant), 1 mg, PRN  hydrALAZINE, 10 mg, Q4H PRN  insulin aspart U-100, 0-10 Units, Q4H PRN  labetalol, 10 mg, Q4H PRN  magnesium oxide, 800 mg, PRN  magnesium oxide, 800 mg, PRN  ondansetron, 4 mg, Q8H PRN  potassium bicarbonate, 35 mEq, PRN  potassium bicarbonate, 50 mEq, PRN  potassium bicarbonate, 60 mEq, PRN  potassium, sodium phosphates, 2 packet, PRN  potassium, sodium phosphates, 2 packet, PRN  potassium, sodium phosphates, 2 packet, PRN  vancomycin - pharmacy to dose, , pharmacy to manage frequency      Today I personally reviewed pertinent medications, lines/drains/airways, imaging, laboratory results, notably:    Diet  Diet NPO Except for: Medication  Diet NPO Except for: Medication    Recent Labs   Lab 10/27/24  0030   WBC 17.84*   RBC 2.77*   HGB 7.8*   HCT 24.8*      MCV 90   MCH 28.2   MCHC 31.5*

## 2024-10-27 NOTE — PROGRESS NOTES
[facility] - Neuro Critical Care  Neurosurgery  Progress Note    Subjective:     History of Present Illness: Percy Ramirez is a 83 y.o. 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 Pt initially presented to  ED 2 days ago after mechanical fall and was found to have small SDH. Repeat scan was stable and patient was discharged home with outpatient follow up and instructions to hold his blood thinners. He re-presented to  ED yesterday after suffering another unwitnessed fall and family noticing him becoming more lethargic with c/o neck and head pain. Blood glucose >600 at OSH. CTH was indicative of 5cm right frontal IPC with 7mm midline shift. K-centra was given at OSH and pt was transferred to [facility] [place] [place] N[facility] for higher level of care. Upon arrival pt INR was 1.2. Pt has waxing and waning exam, at times oriented and following commands and at other times lethargic, disoriented, and not responding. Further history limited due to patient's mental status change.    Post-Op Info:  Procedure(s) (LRB):  CRANIOTOMY, FOR SUBDURAL HEMATOMA EVACUATION (Right)   9 Days Post-Op   Interval History: 10/27/2024: NAEO. Goals of care with [facility], no surgery going to be offered. Poor exam but remains neuro stable.    Medications:  Continuous Infusions:   D10W   Intravenous Continuous PRN        propofol  10 mcg/kg/min Intravenous Continuous 5 mL/hr at 10/27/24 0700 10 mcg/kg/min at 10/27/24 0700     Scheduled Meds:   atorvastatin  40 mg Per OG tube Daily    cloBAZam  20 mg Per OG tube Q12H    famotidine  20 mg Per OG tube Daily    heparin (porcine)  5,000 Units Subcutaneous Q8H    insulin aspart U-100  6 Units Subcutaneous 6 times per day    insulin glargine U-100  25 Units Subcutaneous Daily    lacosamide (Vimpat) IV orderable  200 mg Intravenous Q12H    levETIRAcetam (Keppra) IV (PEDS and ADULTS)  1,000 mg Intravenous Q12H    psyllium  husk (aspartame)  1 packet Per OG tube TID    silodosin  4 mg Per OG tube Daily     PRN Meds:  Current Facility-Administered Medications:     acetaminophen, 650 mg, Per OG tube, Q6H PRN    D10W, , Intravenous, Continuous PRN    dextrose 10%, 12.5 g, Intravenous, PRN    dextrose 10%, 25 g, Intravenous, PRN    fentaNYL, 50 mcg, Intravenous, Q1H PRN    glucagon (human recombinant), 1 mg, Intramuscular, PRN    hydrALAZINE, 10 mg, Intravenous, Q4H PRN    insulin aspart U-100, 0-10 Units, Subcutaneous, Q4H PRN    labetalol, 10 mg, Intravenous, Q4H PRN    magnesium oxide, 800 mg, Per OG tube, PRN    magnesium oxide, 800 mg, Per OG tube, PRN    ondansetron, 4 mg, Intravenous, Q8H PRN    potassium bicarbonate, 35 mEq, Per OG tube, PRN    potassium bicarbonate, 50 mEq, Per OG tube, PRN    potassium bicarbonate, 60 mEq, Per OG tube, PRN    potassium, sodium phosphates, 2 packet, Per OG tube, PRN    potassium, sodium phosphates, 2 packet, Per OG tube, PRN    potassium, sodium phosphates, 2 packet, Per OG tube, PRN     Review of Systems  Objective:     Weight: 83.5 kg (184 lb)  Body mass index is 27.98 kg/m².  Vital Signs (Most Recent):  Temp: 97.5 °F (36.4 °C) (10/27/24 0701)  Pulse: 77 (10/27/24 0701)  Resp: 19 (10/27/24 0701)  BP: 129/60 (10/27/24 0701)  SpO2: 98 % (10/27/24 0701) Vital Signs (24h Range):  Temp:  [96.1 °F (35.6 °C)-99.9 °F (37.7 °C)] 97.5 °F (36.4 °C)  Pulse:  [] 77  Resp:  [19-34] 19  SpO2:  [94 %-100 %] 98 %  BP: (124-172)/(58-80) 129/60  Arterial Line BP: (134-165)/(50-71) 156/60     Date 10/27/24 0700 - 10/28/24 0659   Shift 4609-8550 6756-0792 5824-8293 24 Hour Total   INTAKE   I.V.(mL/kg) 9.6(0.1)   9.6(0.1)   NG/GT 50   50   Shift Total(mL/kg) 59.6(0.7)   59.6(0.7)   OUTPUT   Shift Total(mL/kg)       Weight (kg) 83.5 83.5 83.5 83.5              Vent Mode: A/C  Oxygen Concentration (%):  [40] 40  Resp Rate Total:  [17 br/min-31 br/min] 19 br/min  Vt Set:  [500 mL] 500 mL  PEEP/CPAP:  [5 cmH20] 5  cmH20  Mean Airway Pressure:  [8.6 fwX48-76 cmH20] 8.6 cmH20             NG/OG Tube 10/23/24 2100 14 Fr. Center mouth (Active)   Placement Check placement verified by x-ray;placement verified by distal tube length measurement 10/27/24 0701   Tolerance no signs/symptoms of discomfort 10/27/24 0701   Securement secured to commercial device 10/27/24 0701   Clamp Status/Tolerance unclamped 10/27/24 0701   Suction Setting/Drainage Method suction at the bedside 10/27/24 0701   Insertion Site Appearance no redness, warmth, tenderness, skin breakdown, drainage 10/27/24 0701   Drainage None 10/25/24 1500   Flush/Irrigation flushed w/;water;no resistance met 10/27/24 0701   Feeding Type continuous 10/27/24 0701   Feeding Action feeding continued 10/27/24 0701   Current Rate (mL/hr) 50 mL/hr 10/27/24 0701   Goal Rate (mL/hr) 50 mL/hr 10/27/24 0701   Intake (mL) 50 mL 10/26/24 2005   Water Bolus (mL) 300 mL 10/26/24 1016   Formula Name Diabetisource 10/27/24 0505   Intake (mL) - Formula Tube Feeding 50 10/27/24 0700            Rectal Tube 10/22/24 1718 rectal tube w/ balloon (indicate number of mLs) (Active)   Reposition drainage bags for BMS & Longoria on opposite sides of bed 10/27/24 0701   Outcome stool evacuated 10/27/24 0701   Stool Color Brown 10/27/24 0701   Insertion Site Appearance no redness, warmth, tenderness, skin breakdown, drainage 10/27/24 0701   Flush/Irrigation flushed w/;water;no resistance met 10/27/24 0701   Intake (mL) 50 mL 10/27/24 0605   Rectal Tube Output 200 mL 10/27/24 0605     Neurosurgery Physical Exam     [id number]  PERRL  +c/g, no gag  TF LLE vs min WD   No movement RLE  No movement BUE    Significant Labs:  Recent Labs   Lab 10/26/24  0032 10/27/24  0030   * 232*    143   K 5.0 5.2*   * 111*   CO2 22* 22*   BUN 41* 40*   CREATININE 1.2 1.2   CALCIUM 8.0* 8.4*   MG 3.3* 2.9*     Recent Labs   Lab 10/26/24  0032 10/27/24  0030   WBC 6.46 17.84*   HGB 7.2* 7.8*   HCT 23.9* 24.8*   PLT  "140* 175     No results for input(s): "LABPT", "INR", "APTT" in the last 48 hours.  Microbiology Results (last 7 days)       Procedure Component Value Units Date/Time    Blood culture [[id number]] Collected: 10/18/24 0335    Order Status: Completed Specimen: Blood from Peripheral, Foot, Right Updated: 10/23/24 0612     Blood Culture, Routine No growth after 5 days.    Blood culture [[id number]] Collected: 10/18/24 0345    Order Status: Completed Specimen: Blood from Peripheral, Hand, Left Updated: 10/23/24 0612     Blood Culture, Routine No growth after 5 days.          All pertinent labs from the last 24 hours have been reviewed.    Significant Diagnostics:  I have reviewed all pertinent imaging results/findings within the past 24 hours.  Assessment/Plan:     * Traumatic right-sided intracerebral hemorrhage without loss of consciousness  84 y.o. 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 Given K-centra at OSH.     Taken to OR 10/18 for R frontal IPH evac via eyebrow supraorbital craniotomy. Unfortunately, interval development of large SDH on post op scan, taken back to OR emergently for acute SDH evacuation.    Imaging:  -CTH OSH 10/17: 5cm R frontal ICH with 7mm MLS and some intraventricular blood in posterior lateral vent   -CT Csp OSH 10/17: no acute processes   -rCTH 10/18: grossly stable   -CTA 10/18: post op clot evac with new holoconvexity R SDH, 12 mm MLS   -Post op CTH 10/18: s/p SDH evacuation with appropriate evac, CTA neg   -CTH 10/21: new/increased hyperdensity in the resection cavity without worsening mass effect or midline shift   -rCTH 10/21: stable    S/p R SDH evac on 10/18    Plan:  Patient admitted to ICU on telemetry, q1h neuro checks  Hold anti-plt/coag medications. Given K-centra for reversal at OSH. INR 1.2 on arrival  MRI brain w wo ordered without clear underlying mass, no significant amyloid  SBP <140   Na >135  Keppra 1g " q12, vimpat, onfi and prop added  LEANDRO management per NCC  Transfuse RBC for goal Hgb >7  No HOB restrictions  Drains removed with stitch  WTE plan per NCC; GoC per NCC  Continue to monitor clinically, notify NSGY immediately with any changes in neuro status    Plan discussed with Dr. Condon    Dispmily: admitted to ICU          Discussed with on call staff Dr. Brandi Goel MD  Neurosurgery  [facility] - Neuro Critical Care

## 2024-10-27 NOTE — ASSESSMENT & PLAN NOTE
EEG placed morning of 10/21 due to delay in return to expected LOC  -Multiple seizures throughout the day  -Has been loaded with keppra and vimpat  -Addl seizure evening of 10/24, Onfi added on.   -Current AED regimen:   - Keppra 1500 BID   - Vimpat 200 BID   - Onfi 20 BID  -Propofol gtt at 10cc/hr today due to seizure over night after prop cessation overnight 10/26, plan to wean tomorrow

## 2024-10-27 NOTE — ASSESSMENT & PLAN NOTE
83M PMHx of HTN, HLD, DM2, CAD s/p CABG x2, Afib on Coumadin, ILD on 4L of O2, HFrEF (45%), presenting as a transfer for a large traumatic R frontal IPH reversed with Kcentra and vitamin K with repeat INR 1.2.    Admit to NCC  q1h neuro checks, vitals, and I&O's  CBC, CMP, mag, and phos daily   Coags, TSH, A1c, lipid panel, UA  Echo, EKG, CXR  SBP <160  Na goals > 145.  PRN boluses of 3% HTS if sodium less than 145  Neurosurgery following, drains removed   NPO   SCDs; heparin for VTE prophylaxis  PT/OT/SLP

## 2024-10-27 NOTE — SUBJECTIVE & OBJECTIVE
Interval History: 10/27/2024: NAEO. Goals of care with NCC, no surgery going to be offered. Poor exam but remains neuro stable.    Medications:  Continuous Infusions:   D10W   Intravenous Continuous PRN        propofol  10 mcg/kg/min Intravenous Continuous 5 mL/hr at 10/27/24 0700 10 mcg/kg/min at 10/27/24 0700     Scheduled Meds:   atorvastatin  40 mg Per OG tube Daily    cloBAZam  20 mg Per OG tube Q12H    famotidine  20 mg Per OG tube Daily    heparin (porcine)  5,000 Units Subcutaneous Q8H    insulin aspart U-100  6 Units Subcutaneous 6 times per day    insulin glargine U-100  25 Units Subcutaneous Daily    lacosamide (Vimpat) IV orderable  200 mg Intravenous Q12H    levETIRAcetam (Keppra) IV (PEDS and ADULTS)  1,000 mg Intravenous Q12H    psyllium husk (aspartame)  1 packet Per OG tube TID    silodosin  4 mg Per OG tube Daily     PRN Meds:  Current Facility-Administered Medications:     acetaminophen, 650 mg, Per OG tube, Q6H PRN    D10W, , Intravenous, Continuous PRN    dextrose 10%, 12.5 g, Intravenous, PRN    dextrose 10%, 25 g, Intravenous, PRN    fentaNYL, 50 mcg, Intravenous, Q1H PRN    glucagon (human recombinant), 1 mg, Intramuscular, PRN    hydrALAZINE, 10 mg, Intravenous, Q4H PRN    insulin aspart U-100, 0-10 Units, Subcutaneous, Q4H PRN    labetalol, 10 mg, Intravenous, Q4H PRN    magnesium oxide, 800 mg, Per OG tube, PRN    magnesium oxide, 800 mg, Per OG tube, PRN    ondansetron, 4 mg, Intravenous, Q8H PRN    potassium bicarbonate, 35 mEq, Per OG tube, PRN    potassium bicarbonate, 50 mEq, Per OG tube, PRN    potassium bicarbonate, 60 mEq, Per OG tube, PRN    potassium, sodium phosphates, 2 packet, Per OG tube, PRN    potassium, sodium phosphates, 2 packet, Per OG tube, PRN    potassium, sodium phosphates, 2 packet, Per OG tube, PRN     Review of Systems  Objective:     Weight: 83.5 kg (184 lb)  Body mass index is 27.98 kg/m².  Vital Signs (Most Recent):  Temp: 97.5 °F (36.4 °C) (10/27/24  0701)  Pulse: 77 (10/27/24 0701)  Resp: 19 (10/27/24 0701)  BP: 129/60 (10/27/24 0701)  SpO2: 98 % (10/27/24 0701) Vital Signs (24h Range):  Temp:  [96.1 °F (35.6 °C)-99.9 °F (37.7 °C)] 97.5 °F (36.4 °C)  Pulse:  [] 77  Resp:  [19-34] 19  SpO2:  [94 %-100 %] 98 %  BP: (124-172)/(58-80) 129/60  Arterial Line BP: (134-165)/(50-71) 156/60     Date 10/27/24 0700 - 10/28/24 0659   Shift 9499-0698 1682-2229 6176-7678 24 Hour Total   INTAKE   I.V.(mL/kg) 9.6(0.1)   9.6(0.1)   NG/GT 50   50   Shift Total(mL/kg) 59.6(0.7)   59.6(0.7)   OUTPUT   Shift Total(mL/kg)       Weight (kg) 83.5 83.5 83.5 83.5              Vent Mode: A/C  Oxygen Concentration (%):  [40] 40  Resp Rate Total:  [17 br/min-31 br/min] 19 br/min  Vt Set:  [500 mL] 500 mL  PEEP/CPAP:  [5 cmH20] 5 cmH20  Mean Airway Pressure:  [8.6 sgL39-76 cmH20] 8.6 cmH20             NG/OG Tube 10/23/24 2100 14 Fr. Center mouth (Active)   Placement Check placement verified by x-ray;placement verified by distal tube length measurement 10/27/24 0701   Tolerance no signs/symptoms of discomfort 10/27/24 0701   Securement secured to commercial device 10/27/24 0701   Clamp Status/Tolerance unclamped 10/27/24 0701   Suction Setting/Drainage Method suction at the bedside 10/27/24 0701   Insertion Site Appearance no redness, warmth, tenderness, skin breakdown, drainage 10/27/24 0701   Drainage None 10/25/24 1500   Flush/Irrigation flushed w/;water;no resistance met 10/27/24 0701   Feeding Type continuous 10/27/24 0701   Feeding Action feeding continued 10/27/24 0701   Current Rate (mL/hr) 50 mL/hr 10/27/24 0701   Goal Rate (mL/hr) 50 mL/hr 10/27/24 0701   Intake (mL) 50 mL 10/26/24 2005   Water Bolus (mL) 300 mL 10/26/24 1016   Formula Name Diabetisource 10/27/24 0505   Intake (mL) - Formula Tube Feeding 50 10/27/24 0700            Rectal Tube 10/22/24 1718 rectal tube w/ balloon (indicate number of mLs) (Active)   Reposition drainage bags for BMS & Longoria on opposite sides of  "bed 10/27/24 0701   Outcome stool evacuated 10/27/24 0701   Stool Color Brown 10/27/24 0701   Insertion Site Appearance no redness, warmth, tenderness, skin breakdown, drainage 10/27/24 0701   Flush/Irrigation flushed w/;water;no resistance met 10/27/24 0701   Intake (mL) 50 mL 10/27/24 0605   Rectal Tube Output 200 mL 10/27/24 0605     Neurosurgery Physical Exam     [id number]  PERRL  +c/g, no gag  TF LLE vs min WD   No movement RLE  No movement BUE    Significant Labs:  Recent Labs   Lab 10/26/24  0032 10/27/24  0030   * 232*    143   K 5.0 5.2*   * 111*   CO2 22* 22*   BUN 41* 40*   CREATININE 1.2 1.2   CALCIUM 8.0* 8.4*   MG 3.3* 2.9*     Recent Labs   Lab 10/26/24  0032 10/27/24  0030   WBC 6.46 17.84*   HGB 7.2* 7.8*   HCT 23.9* 24.8*   * 175     No results for input(s): "LABPT", "INR", "APTT" in the last 48 hours.  Microbiology Results (last 7 days)       Procedure Component Value Units Date/Time    Blood culture [[id number]] Collected: 10/18/24 0335    Order Status: Completed Specimen: Blood from Peripheral, Foot, Right Updated: 10/23/24 0612     Blood Culture, Routine No growth after 5 days.    Blood culture [[id number]] Collected: 10/18/24 0345    Order Status: Completed Specimen: Blood from Peripheral, Hand, Left Updated: 10/23/24 0612     Blood Culture, Routine No growth after 5 days.          All pertinent labs from the last 24 hours have been reviewed.    Significant Diagnostics:  I have reviewed all pertinent imaging results/findings within the past 24 hours.  "

## 2024-10-28 LAB
ABO + RH BLD: NORMAL
ALBUMIN SERPL BCP-MCNC: 1.8 G/DL (ref 3.5–5.2)
ALLENS TEST: ABNORMAL
ALP SERPL-CCNC: 92 U/L (ref 40–150)
ALT SERPL W/O P-5'-P-CCNC: 12 U/L (ref 10–44)
ANION GAP SERPL CALC-SCNC: 9 MMOL/L (ref 8–16)
ANISOCYTOSIS BLD QL SMEAR: SLIGHT
ANISOCYTOSIS BLD QL SMEAR: SLIGHT
AST SERPL-CCNC: 47 U/L (ref 10–40)
BACTERIA #/AREA URNS AUTO: ABNORMAL /HPF
BASOPHILS # BLD AUTO: ABNORMAL K/UL (ref 0–0.2)
BASOPHILS # BLD AUTO: ABNORMAL K/UL (ref 0–0.2)
BASOPHILS NFR BLD: 0 % (ref 0–1.9)
BASOPHILS NFR BLD: 0 % (ref 0–1.9)
BILIRUB SERPL-MCNC: 0.4 MG/DL (ref 0.1–1)
BILIRUB UR QL STRIP: NEGATIVE
BLD GP AB SCN CELLS X3 SERPL QL: NORMAL
BUN SERPL-MCNC: 41 MG/DL (ref 8–23)
CALCIUM SERPL-MCNC: 8.4 MG/DL (ref 8.7–10.5)
CHLORIDE SERPL-SCNC: 109 MMOL/L (ref 95–110)
CLARITY UR REFRACT.AUTO: ABNORMAL
CO2 SERPL-SCNC: 23 MMOL/L (ref 23–29)
COLOR UR AUTO: YELLOW
CREAT SERPL-MCNC: 1.4 MG/DL (ref 0.5–1.4)
DELSYS: ABNORMAL
DIFFERENTIAL METHOD BLD: ABNORMAL
DIFFERENTIAL METHOD BLD: ABNORMAL
EOSINOPHIL # BLD AUTO: ABNORMAL K/UL (ref 0–0.5)
EOSINOPHIL # BLD AUTO: ABNORMAL K/UL (ref 0–0.5)
EOSINOPHIL NFR BLD: 0 % (ref 0–8)
EOSINOPHIL NFR BLD: 1 % (ref 0–8)
ERYTHROCYTE [DISTWIDTH] IN BLOOD BY AUTOMATED COUNT: 16.8 % (ref 11.5–14.5)
ERYTHROCYTE [DISTWIDTH] IN BLOOD BY AUTOMATED COUNT: 16.9 % (ref 11.5–14.5)
ERYTHROCYTE [SEDIMENTATION RATE] IN BLOOD BY WESTERGREN METHOD: 16 MM/H
EST. GFR  (NO RACE VARIABLE): 50.2 ML/MIN/1.73 M^2
FIO2: 40
GLUCOSE SERPL-MCNC: 236 MG/DL (ref 70–110)
GLUCOSE UR QL STRIP: NEGATIVE
HCO3 UR-SCNC: 26.3 MMOL/L (ref 24–28)
HCT VFR BLD AUTO: 22.6 % (ref 40–54)
HCT VFR BLD AUTO: 23.5 % (ref 40–54)
HGB BLD-MCNC: 7 G/DL (ref 14–18)
HGB BLD-MCNC: 7.1 G/DL (ref 14–18)
HGB UR QL STRIP: ABNORMAL
HYALINE CASTS UR QL AUTO: 0 /LPF
IMM GRANULOCYTES # BLD AUTO: ABNORMAL K/UL (ref 0–0.04)
IMM GRANULOCYTES # BLD AUTO: ABNORMAL K/UL (ref 0–0.04)
IMM GRANULOCYTES NFR BLD AUTO: ABNORMAL % (ref 0–0.5)
IMM GRANULOCYTES NFR BLD AUTO: ABNORMAL % (ref 0–0.5)
KETONES UR QL STRIP: NEGATIVE
LEUKOCYTE ESTERASE UR QL STRIP: ABNORMAL
LYMPHOCYTES # BLD AUTO: ABNORMAL K/UL (ref 1–4.8)
LYMPHOCYTES # BLD AUTO: ABNORMAL K/UL (ref 1–4.8)
LYMPHOCYTES NFR BLD: 2 % (ref 18–48)
LYMPHOCYTES NFR BLD: 5 % (ref 18–48)
MAGNESIUM SERPL-MCNC: 2.8 MG/DL (ref 1.6–2.6)
MCH RBC QN AUTO: 27.8 PG (ref 27–31)
MCH RBC QN AUTO: 28 PG (ref 27–31)
MCHC RBC AUTO-ENTMCNC: 29.8 G/DL (ref 32–36)
MCHC RBC AUTO-ENTMCNC: 31.4 G/DL (ref 32–36)
MCV RBC AUTO: 89 FL (ref 82–98)
MCV RBC AUTO: 93 FL (ref 82–98)
METAMYELOCYTES NFR BLD MANUAL: 1 %
METAMYELOCYTES NFR BLD MANUAL: 3 %
MICROSCOPIC COMMENT: ABNORMAL
MODE: ABNORMAL
MONOCYTES # BLD AUTO: ABNORMAL K/UL (ref 0.3–1)
MONOCYTES # BLD AUTO: ABNORMAL K/UL (ref 0.3–1)
MONOCYTES NFR BLD: 5 % (ref 4–15)
MONOCYTES NFR BLD: 5 % (ref 4–15)
MYELOCYTES NFR BLD MANUAL: 1 %
MYELOCYTES NFR BLD MANUAL: 2 %
NEUTROPHILS NFR BLD: 83 % (ref 38–73)
NEUTROPHILS NFR BLD: 86 % (ref 38–73)
NEUTS BAND NFR BLD MANUAL: 2 %
NEUTS BAND NFR BLD MANUAL: 4 %
NITRITE UR QL STRIP: POSITIVE
NRBC BLD-RTO: 1 /100 WBC
NRBC BLD-RTO: 1 /100 WBC
OVALOCYTES BLD QL SMEAR: ABNORMAL
PCO2 BLDA: 42 MMHG (ref 35–45)
PEEP: 5
PH SMN: 7.4 [PH] (ref 7.35–7.45)
PH UR STRIP: 6 [PH] (ref 5–8)
PHOSPHATE SERPL-MCNC: 3.1 MG/DL (ref 2.7–4.5)
PLATELET # BLD AUTO: 165 K/UL (ref 150–450)
PLATELET # BLD AUTO: 172 K/UL (ref 150–450)
PLATELET BLD QL SMEAR: ABNORMAL
PLATELET BLD QL SMEAR: ABNORMAL
PMV BLD AUTO: 11.2 FL (ref 9.2–12.9)
PMV BLD AUTO: 11.3 FL (ref 9.2–12.9)
PO2 BLDA: 98 MMHG (ref 80–100)
POC BE: 2 MMOL/L
POC SATURATED O2: 98 % (ref 95–100)
POC TCO2: 28 MMOL/L (ref 23–27)
POCT GLUCOSE: 224 MG/DL (ref 70–110)
POCT GLUCOSE: 227 MG/DL (ref 70–110)
POCT GLUCOSE: 252 MG/DL (ref 70–110)
POCT GLUCOSE: 253 MG/DL (ref 70–110)
POCT GLUCOSE: 264 MG/DL (ref 70–110)
POCT GLUCOSE: 274 MG/DL (ref 70–110)
POIKILOCYTOSIS BLD QL SMEAR: SLIGHT
POTASSIUM SERPL-SCNC: 5.1 MMOL/L (ref 3.5–5.1)
PROT SERPL-MCNC: 5.8 G/DL (ref 6–8.4)
PROT UR QL STRIP: ABNORMAL
RBC # BLD AUTO: 2.52 M/UL (ref 4.6–6.2)
RBC # BLD AUTO: 2.54 M/UL (ref 4.6–6.2)
RBC #/AREA URNS AUTO: 2 /HPF (ref 0–4)
SAMPLE: ABNORMAL
SITE: ABNORMAL
SODIUM SERPL-SCNC: 141 MMOL/L (ref 136–145)
SP GR UR STRIP: 1.02 (ref 1–1.03)
SP02: 97
SPECIMEN OUTDATE: NORMAL
SPHEROCYTES BLD QL SMEAR: ABNORMAL
SQUAMOUS #/AREA URNS AUTO: 0 /HPF
TOXIC GRANULES BLD QL SMEAR: PRESENT
TRIGL SERPL-MCNC: 129 MG/DL (ref 30–150)
UNSPECIFIED CRY UR QL COMP ASSIST: 11
URN SPEC COLLECT METH UR: ABNORMAL
VT: 500
WBC # BLD AUTO: 15.34 K/UL (ref 3.9–12.7)
WBC # BLD AUTO: 15.66 K/UL (ref 3.9–12.7)
WBC #/AREA URNS AUTO: 8 /HPF (ref 0–5)
YEAST UR QL AUTO: ABNORMAL

## 2024-10-28 PROCEDURE — 84478 ASSAY OF TRIGLYCERIDES: CPT | Performed by: REGISTERED NURSE

## 2024-10-28 PROCEDURE — 25000003 PHARM REV CODE 250

## 2024-10-28 PROCEDURE — 87040 BLOOD CULTURE FOR BACTERIA: CPT

## 2024-10-28 PROCEDURE — 83735 ASSAY OF MAGNESIUM: CPT

## 2024-10-28 PROCEDURE — 95714 VEEG EA 12-26 HR UNMNTR: CPT

## 2024-10-28 PROCEDURE — 25000003 PHARM REV CODE 250: Performed by: PSYCHIATRY & NEUROLOGY

## 2024-10-28 PROCEDURE — 25000003 PHARM REV CODE 250: Performed by: STUDENT IN AN ORGANIZED HEALTH CARE EDUCATION/TRAINING PROGRAM

## 2024-10-28 PROCEDURE — C9254 INJECTION, LACOSAMIDE: HCPCS

## 2024-10-28 PROCEDURE — 27100171 HC OXYGEN HIGH FLOW UP TO 24 HOURS

## 2024-10-28 PROCEDURE — 94799 UNLISTED PULMONARY SVC/PX: CPT

## 2024-10-28 PROCEDURE — 20000000 HC ICU ROOM

## 2024-10-28 PROCEDURE — 37799 UNLISTED PX VASCULAR SURGERY: CPT

## 2024-10-28 PROCEDURE — 94003 VENT MGMT INPAT SUBQ DAY: CPT

## 2024-10-28 PROCEDURE — 99233 SBSQ HOSP IP/OBS HIGH 50: CPT | Mod: ,,, | Performed by: PHYSICIAN ASSISTANT

## 2024-10-28 PROCEDURE — 85027 COMPLETE CBC AUTOMATED: CPT | Mod: 91

## 2024-10-28 PROCEDURE — 51701 INSERT BLADDER CATHETER: CPT

## 2024-10-28 PROCEDURE — 99900026 HC AIRWAY MAINTENANCE (STAT)

## 2024-10-28 PROCEDURE — 85007 BL SMEAR W/DIFF WBC COUNT: CPT | Performed by: REGISTERED NURSE

## 2024-10-28 PROCEDURE — 82803 BLOOD GASES ANY COMBINATION: CPT

## 2024-10-28 PROCEDURE — 63600175 PHARM REV CODE 636 W HCPCS: Performed by: REGISTERED NURSE

## 2024-10-28 PROCEDURE — 94761 N-INVAS EAR/PLS OXIMETRY MLT: CPT

## 2024-10-28 PROCEDURE — 99291 CRITICAL CARE FIRST HOUR: CPT | Mod: GC,,, | Performed by: INTERNAL MEDICINE

## 2024-10-28 PROCEDURE — 63600175 PHARM REV CODE 636 W HCPCS

## 2024-10-28 PROCEDURE — 81001 URINALYSIS AUTO W/SCOPE: CPT

## 2024-10-28 PROCEDURE — 25000003 PHARM REV CODE 250: Performed by: NURSE PRACTITIONER

## 2024-10-28 PROCEDURE — 80053 COMPREHEN METABOLIC PANEL: CPT

## 2024-10-28 PROCEDURE — 63600175 PHARM REV CODE 636 W HCPCS: Performed by: NURSE PRACTITIONER

## 2024-10-28 PROCEDURE — 95720 EEG PHY/QHP EA INCR W/VEEG: CPT | Mod: ,,, | Performed by: STUDENT IN AN ORGANIZED HEALTH CARE EDUCATION/TRAINING PROGRAM

## 2024-10-28 PROCEDURE — 86850 RBC ANTIBODY SCREEN: CPT | Performed by: REGISTERED NURSE

## 2024-10-28 PROCEDURE — 99900035 HC TECH TIME PER 15 MIN (STAT)

## 2024-10-28 PROCEDURE — 51798 US URINE CAPACITY MEASURE: CPT

## 2024-10-28 PROCEDURE — 25000242 PHARM REV CODE 250 ALT 637 W/ HCPCS: Performed by: NURSE PRACTITIONER

## 2024-10-28 PROCEDURE — 63600175 PHARM REV CODE 636 W HCPCS: Performed by: PSYCHIATRY & NEUROLOGY

## 2024-10-28 PROCEDURE — 84100 ASSAY OF PHOSPHORUS: CPT

## 2024-10-28 PROCEDURE — 85027 COMPLETE CBC AUTOMATED: CPT | Performed by: REGISTERED NURSE

## 2024-10-28 PROCEDURE — 85007 BL SMEAR W/DIFF WBC COUNT: CPT | Mod: 91

## 2024-10-28 RX ORDER — CLOBAZAM 10 MG/1
10 TABLET ORAL ONCE
Status: COMPLETED | OUTPATIENT
Start: 2024-10-28 | End: 2024-10-28

## 2024-10-28 RX ORDER — CLOBAZAM 10 MG/1
20 TABLET ORAL NIGHTLY
Status: DISCONTINUED | OUTPATIENT
Start: 2024-10-29 | End: 2024-10-29

## 2024-10-28 RX ORDER — INSULIN GLARGINE 100 [IU]/ML
28 INJECTION, SOLUTION SUBCUTANEOUS DAILY
Status: DISCONTINUED | OUTPATIENT
Start: 2024-10-28 | End: 2024-10-30

## 2024-10-28 RX ADMIN — PIPERACILLIN SODIUM AND TAZOBACTAM SODIUM 4.5 G: 4; .5 INJECTION, POWDER, FOR SOLUTION INTRAVENOUS at 08:10

## 2024-10-28 RX ADMIN — PSYLLIUM HUSK 1 PACKET: 3.4 POWDER ORAL at 09:10

## 2024-10-28 RX ADMIN — HEPARIN SODIUM 5000 UNITS: 5000 INJECTION INTRAVENOUS; SUBCUTANEOUS at 02:10

## 2024-10-28 RX ADMIN — INSULIN ASPART 4 UNITS: 100 INJECTION, SOLUTION INTRAVENOUS; SUBCUTANEOUS at 03:10

## 2024-10-28 RX ADMIN — LACOSAMIDE 200 MG: 10 INJECTION INTRAVENOUS at 09:10

## 2024-10-28 RX ADMIN — INSULIN ASPART 3 UNITS: 100 INJECTION, SOLUTION INTRAVENOUS; SUBCUTANEOUS at 09:10

## 2024-10-28 RX ADMIN — CLOBAZAM 10 MG: 10 TABLET ORAL at 09:10

## 2024-10-28 RX ADMIN — INSULIN ASPART 10 UNITS: 100 INJECTION, SOLUTION INTRAVENOUS; SUBCUTANEOUS at 05:10

## 2024-10-28 RX ADMIN — HEPARIN SODIUM 5000 UNITS: 5000 INJECTION INTRAVENOUS; SUBCUTANEOUS at 05:10

## 2024-10-28 RX ADMIN — PIPERACILLIN SODIUM AND TAZOBACTAM SODIUM 4.5 G: 4; .5 INJECTION, POWDER, FOR SOLUTION INTRAVENOUS at 12:10

## 2024-10-28 RX ADMIN — SILODOSIN 4 MG: 4 CAPSULE ORAL at 08:10

## 2024-10-28 RX ADMIN — LEVETIRACETAM 1500 MG: 100 INJECTION INTRAVENOUS at 07:10

## 2024-10-28 RX ADMIN — INSULIN ASPART 10 UNITS: 100 INJECTION, SOLUTION INTRAVENOUS; SUBCUTANEOUS at 09:10

## 2024-10-28 RX ADMIN — PSYLLIUM HUSK 1 PACKET: 3.4 POWDER ORAL at 08:10

## 2024-10-28 RX ADMIN — ATORVASTATIN CALCIUM 40 MG: 40 TABLET, FILM COATED ORAL at 08:10

## 2024-10-28 RX ADMIN — INSULIN ASPART 10 UNITS: 100 INJECTION, SOLUTION INTRAVENOUS; SUBCUTANEOUS at 12:10

## 2024-10-28 RX ADMIN — HEPARIN SODIUM 5000 UNITS: 5000 INJECTION INTRAVENOUS; SUBCUTANEOUS at 09:10

## 2024-10-28 RX ADMIN — INSULIN ASPART 4 UNITS: 100 INJECTION, SOLUTION INTRAVENOUS; SUBCUTANEOUS at 07:10

## 2024-10-28 RX ADMIN — PIPERACILLIN SODIUM AND TAZOBACTAM SODIUM 4.5 G: 4; .5 INJECTION, POWDER, FOR SOLUTION INTRAVENOUS at 03:10

## 2024-10-28 RX ADMIN — INSULIN ASPART 6 UNITS: 100 INJECTION, SOLUTION INTRAVENOUS; SUBCUTANEOUS at 12:10

## 2024-10-28 RX ADMIN — PSYLLIUM HUSK 1 PACKET: 3.4 POWDER ORAL at 02:10

## 2024-10-28 RX ADMIN — PROPOFOL 10 MCG/KG/MIN: 10 INJECTION, EMULSION INTRAVENOUS at 05:10

## 2024-10-28 RX ADMIN — INSULIN ASPART 6 UNITS: 100 INJECTION, SOLUTION INTRAVENOUS; SUBCUTANEOUS at 05:10

## 2024-10-28 RX ADMIN — VANCOMYCIN HYDROCHLORIDE 1250 MG: 1.25 INJECTION, POWDER, LYOPHILIZED, FOR SOLUTION INTRAVENOUS at 12:10

## 2024-10-28 RX ADMIN — LEVETIRACETAM 1500 MG: 100 INJECTION INTRAVENOUS at 08:10

## 2024-10-28 RX ADMIN — INSULIN ASPART 3 UNITS: 100 INJECTION, SOLUTION INTRAVENOUS; SUBCUTANEOUS at 12:10

## 2024-10-28 RX ADMIN — INSULIN GLARGINE 28 UNITS: 100 INJECTION, SOLUTION SUBCUTANEOUS at 09:10

## 2024-10-28 RX ADMIN — INSULIN ASPART 10 UNITS: 100 INJECTION, SOLUTION INTRAVENOUS; SUBCUTANEOUS at 07:10

## 2024-10-28 RX ADMIN — ACETAMINOPHEN 650 MG: 325 TABLET ORAL at 06:10

## 2024-10-28 RX ADMIN — FAMOTIDINE 20 MG: 20 TABLET ORAL at 08:10

## 2024-10-28 RX ADMIN — CLOBAZAM 20 MG: 10 TABLET ORAL at 08:10

## 2024-10-28 RX ADMIN — INSULIN ASPART 10 UNITS: 100 INJECTION, SOLUTION INTRAVENOUS; SUBCUTANEOUS at 03:10

## 2024-10-28 NOTE — ASSESSMENT & PLAN NOTE
83M PMHx of HTN, HLD, DM2, CAD s/p CABG x2, Afib on Coumadin, ILD on 4L of O2, HFrEF (45%), presenting as a transfer for a large traumatic R frontal IPH reversed with Kcentra and vitamin K with repeat INR 1.2.    Admit to NCC  q1h neuro checks, vitals, and I&O's  CBC, CMP, mag, and phos daily   Coags, TSH, A1c, lipid panel, UA  Echo, EKG, CXR  SBP <160  Na goals > 135  NSGY signed off  NPO   SCDs; heparin for VTE prophylaxis  PT/OT/SLP when extubated

## 2024-10-28 NOTE — PROGRESS NOTES
[facility] - Neuro Critical Care  Neurocritical Care  Progress Note    Admit Date: 10/17/2024  Service Date: 10/28/2024  Length of Stay: 11    Subjective:     Chief Complaint: Traumatic right-sided intracerebral hemorrhage without loss of consciousness    History of Present Illness: Percy Ramirez is a 83M PMHx of HTN, HLD, DM2, CAD s/p CABG x2, Afib on Coumadin, ILD on 4L of O2, HFrEF (45%), presenting as a transfer for a large traumatic R frontal IPH. Pt initially presented to [facility] ED yesterday ago after mechanical fall and was found to have 4mm parafalcine SDH. Repeat CTH was stable. Pt was discharged home on keppra and instructed to hold his blood thinners. On 10/17/24 pt was lethargic and fell. He was brought back to  ED. Blood glucose was >600 and he was febrile (101F). CTH revealed 5cm R frontal IPH with 7mm MLS. Kcentra, 10mg vitamin K, and 3% HTS bolus were given. He was transferred to [facility] for higher level of care.     Hospital Course: 10/19/2024: POD #1 from both his MI LS as well as his right subdural/intraparenchymal hemorrhage evacuation.  He is still intubated and although sedation is off, he is minimally responsive. Coreg doubled. NG tube placed and tube feeds started. Still on insulin drip.  10/20/2024: POD #2 s/p crani for SDH evacuation. Leukocytosis noted post-op, no accompanying fever/chills. Straight cath x1. Tachycardia responding to IVF boluses. Tube feeds started, will titrate to goal. Insulin gtt continued, plans to transition to subQ tomorrow.  10/21/2021: POD#3. EEG placed this morning, revealing multiple r-sided seizures. Keppra load completed and maintenance dose increased. Concern for LUE no longer WD, stat head CT showing new/increased hyperdensity in resection bed w/o worsening mass effect or MLS. 1L LR given for LEANDRO. Will continue to monitor Na.   10/22/24: POD#4. Addl seizures despite keppra load. Vimpat and propofol started yesterday with significant reduction in number.  1U PRBC transfused overnight. Required levo for short period of time after initiation of propofol.  10/23/24: POD#5.  Patient continues to be hyponatremic.  Free water volume increased.  Patient with total of 11 seizures yesterday.  Vimpat and propofol dosing increased with improvement.  10/24/24: POD#6. NGT adjusted overnight. No additional seizures on current AEM regimen. Platelets and Hgb decreasing, will order peripheral smear to assess for HIT  10/25/2024 POD#7. Addl seizure, Onfi added to AEM regimen. Drains removed by NSGY. Will attempt to wean propofol tomorrow.   10/26/2024 POD#8. MRI complete yx. Weaning propofol today.   10/27/2024 POD#9. Patient began having seizures again overnight following cessation of propofol gtt. Resumed propofol at 10cc/hr for now, increased Keppra dose, and plan to attempt to wean again tomorrow. Persistent hyperglycemia on tube feeds, optimizing insulin regimen. Leukocytosis pending respiratory cultures.  10/28/2024 POD10. No addl seizures overnight. Propofol turned off this morning. Clobazam dosing decreased as well. Resp cultures negative, but will obtain blood and urine cultures. Cont abx. NSGY signing off.     Interval History:     NAEON. No seizures on EEG. Prop able to be turned off. Sputum cultures negative, will obtain blood and urine as well. NSGY to sign off.    Objective:     Vitals:  Temp: (!) 89.1 °F (31.7 °C)  Pulse: 88  Rhythm: atrial rhythm  BP: 132/63  MAP (mmHg): 91  Resp: 20  SpO2: 99 %  Oxygen Concentration (%): 40  Vent Mode: A/C  Set Rate: 16 BPM  Vt Set: 500 mL  PEEP/CPAP: 5 cmH20  Peak Airway Pressure: 21 cmH20  Mean Airway Pressure: 10 cmH20  Plateau Pressure: 13 cmH20    Temp  Min: 77 °F (25 °C)  Max: 100.5 °F (38.1 °C)  Pulse  Min: 80  Max: 95  BP  Min: 104/53  Max: 163/70  MAP (mmHg)  Min: 74  Max: 101  Resp  Min: 18  Max: 22  SpO2  Min: 97 %  Max: 100 %  Oxygen Concentration (%)  Min: 40  Max: 40    10/27 0701 - 10/28 0700  In: 2237.7  [I.V.:115.6]  Out: 1500 [Urine:1400]   Unmeasured Output  Urine Occurrence: 1  Stool Occurrence: 1  Pad Count: 1        Physical Exam  Constitutional:       General: He is not in acute distress.     Appearance: He is not toxic-appearing.   Cardiovascular:      Rate and Rhythm: Normal rate.      Comments: Monitor reading afib  Pulmonary:      Effort: Pulmonary effort is normal.   Abdominal:      General: There is no distension.      Palpations: Abdomen is soft.   Musculoskeletal:         General: No swelling.   Skin:     General: Skin is warm and dry.   Neurological:      Comments: Sedated; unable to obtain neuro exam              Medications:  [id number]    Scheduledatorvastatin, 40 mg, Daily  cloBAZam, 10 mg, Once  [START ON 10/29/2024] cloBAZam, 20 mg, QHS  famotidine, 20 mg, Daily  heparin (porcine), 5,000 Units, Q8H  insulin aspart U-100, 10 Units, 6 times per day  insulin glargine U-100, 28 Units, Daily  lacosamide (Vimpat) IV orderable, 200 mg, Q12H  levETIRAcetam (Keppra) IV (PEDS and ADULTS), 1,500 mg, Q12H  piperacillin-tazobactam (Zosyn) IV (PEDS and ADULTS) (extended infusion is not appropriate), 4.5 g, Q8H  psyllium husk (aspartame), 1 packet, TID  silodosin, 4 mg, Daily  vancomycin (VANCOCIN) IV (PEDS and ADULTS), 15 mg/kg, Q24H    PRNacetaminophen, 650 mg, Q6H PRN  D10W, , Continuous PRN  dextrose 10%, 12.5 g, PRN  dextrose 10%, 25 g, PRN  fentaNYL, 50 mcg, Q1H PRN  glucagon (human recombinant), 1 mg, PRN  hydrALAZINE, 10 mg, Q4H PRN  insulin aspart U-100, 0-10 Units, Q4H PRN  labetalol, 10 mg, Q4H PRN  magnesium oxide, 800 mg, PRN  magnesium oxide, 800 mg, PRN  ondansetron, 4 mg, Q8H PRN  potassium bicarbonate, 35 mEq, PRN  potassium bicarbonate, 50 mEq, PRN  potassium bicarbonate, 60 mEq, PRN  potassium, sodium phosphates, 2 packet, PRN  potassium, sodium phosphates, 2 packet, PRN  potassium, sodium phosphates, 2 packet, PRN  vancomycin - pharmacy to dose, , pharmacy to manage  frequency      Today I personally reviewed pertinent medications, lines/drains/airways, laboratory results, microbiology results,     Diet  Diet NPO Except for: Medication  Diet NPO Except for: Medication    Assessment/Plan:     Neuro  * Traumatic right-sided intracerebral hemorrhage without loss of consciousness  83M PMHx of HTN, HLD, DM2, CAD s/p CABG x2, Afib on Coumadin, ILD on 4L of O2, HFrEF (45%), presenting as a transfer for a large traumatic R frontal IPH reversed with Kcentra and vitamin K with repeat INR 1.2.    Admit to NCC  q1h neuro checks, vitals, and I&O's  CBC, CMP, mag, and phos daily   Coags, TSH, A1c, lipid panel, UA  Echo, EKG, CXR  SBP <160  Na goals > 135  NSGY signed off  NPO   SCDs; heparin for VTE prophylaxis  PT/OT/SLP when extubated    Intracranial hemorrhage  See primary problem    Subdural hematoma  See primary problem    Focal seizures  EEG placed morning of 10/21 due to delay in return to expected LOC  -Multiple seizures throughout the day  -Has been loaded with keppra and vimpat  -Addl seizure evening of 10/24, Onfi added on.   -Current AED regimen:   - Keppra 1500 BID   - Vimpat 200 BID   - Onfi 20 BID  -Propofol gtt weaned today with no additional seizures, will receive Onfi 10mg tonight and then will only receive 20mg nightly    Psychiatric  Major depressive disorder, recurrent episode, mild  Restart home SSRI once able to take p.o.    Pulmonary  ILD (interstitial lung disease)  ILD on 4L of O2 at home    SpO2 goal > 88%  Duo nebs and tiotropium  Currently intubated    Cardiac/Vascular  Chronic combined systolic and diastolic heart failure  06/29/2024 echo with EF 45%, was previously 30-40%    Repeat echo shows his EF is still 45%  HDS    Persistent atrial fibrillation  In atrial fibrillation on admit  Hold Coumadin in setting of acute intraparenchymal hemorrhage  Heparin subcu for VTE prophylaxis    Hyperlipidemia LDL goal <100  Lipid panel showed low LDL, low HDL, low total  cholesterol  Atorvastatin    Coronary artery disease  S/p CABG x2 in 2004  Patient has reportedly not been on Plavix recently because of his Coumadin      Benign hypertension with chronic kidney disease, stage III  SBP goal < 160  PRN labetalol and hydralazine   Propofol discontinued, will schedule home meds as needed for HTN      Renal/  LEANDRO (acute kidney injury)  LEANDRO on CKD, Cr 1.8 on initial presentation to ED    Recent Labs     10/26/24  0032 10/27/24  0030 10/28/24  0029   CREATININE 1.2 1.2 1.4   EGFRNORACEVR >60.0 >60.0 50.2*        Plan  - LEANDRO is resolved  - Avoid nephrotoxins and renally dose meds for GFR listed above  - Monitor urine output, serial BMP, and adjust therapy as needed  - Q1H I&Os    Chronic kidney disease, stage 3a  Cr 1.8 on initial presentation to ED, increased to 2.6, LEANDRO now resolved    Creatine stable for now. BMP reviewed- noted Estimated Creatinine Clearance: 42.8 mL/min (based on SCr of 1.4 mg/dL). according to latest data. Based on current GFR, CKD stage is stage 3 - GFR 30-59.  Monitor UOP and serial BMP and adjust therapy as needed. Renally dose meds. Avoid nephrotoxic medications and procedures.    Oncology  Leukocytosis  Elevated WBC 17.84 today. Procal elevated at 0.82.  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 Beta hydroxybutyrate and urine ketones negative. Serum CO2 22. Given SQ insulin at OSH.  On arrival to [facility], . Hb A1c 9.9% on admit.     Patient's FSGs are uncontrolled due to hyperglycemia on current medication regimen.  Last A1c reviewed-   Lab Results   Component Value Date    LABA1C 6.8 10/09/2017    HGBA1C 9.9 (H) 10/17/2024     Most recent fingerstick glucose reviewed-   Recent Labs   Lab 10/28/24  0029 10/28/24  0317 10/28/24  0753 10/28/24  1224   POCTGLUCOSE 252* 227* 224* 253*       Current correctional  scale  Medium  Increase anti-hyperglycemic dose as follows-   Antihyperglycemics (From admission, onward)      Start     Stop Route Frequency Ordered    10/28/24 0900  insulin glargine U-100 (Lantus) pen 28 Units         -- SubQ Daily 10/28/24 0853    10/27/24 1600  insulin aspart U-100 pen 10 Units         -- SubQ 6 times per day 10/27/24 1312    10/25/24 1133  insulin aspart U-100 pen 0-10 Units         -- SubQ Every 4 hours PRN 10/25/24 1036    10/22/24 1400  insulin regular in 0.9 % NaCl 100 unit/100 mL (1 unit/mL) infusion        Question Answer Comment   Insulin Rate Adjustment (DO NOT MODIFY ANSWER) [email or web address]    Enter initial dose from Infusion Protocol Chart (Units/hr): 1.5        10/25/24 1431 IV Continuous 10/22/24 1252    10/21/24 1145  insulin regular in 0.9 % NaCl 100 unit/100 mL (1 unit/mL) infusion        Question:  Enter initial dose from Infusion Protocol Chart (Units/hr):  Answer:  1.65    10/21/24 1343 IV Continuous 10/21/24 1042          Hold Oral hypoglycemics while patient is in the hospital.      Other  Obstructive sleep apnea treated with BiPAP  Currently intubated.  Daily ABG  Daily CXR          The patient is being Prophylaxed for:  Venous Thromboembolism with: Mechanical or Chemical  Stress Ulcer with: H2B  Ventilator Pneumonia with: chlorhexidine oral care    Activity Orders            Turn patient starting at 10/18 0000    Diet NPO Except for: Medication: NPO starting at 10/17 2318          Full Code    Dolores Moore DO  Neurocritical Care  [facility] - Neuro Critical Care

## 2024-10-28 NOTE — SUBJECTIVE & OBJECTIVE
Interval History: 10/28/2024: MARILIN. AFVSS. Goals of care with NCC, no surgery going to be offered. Poor exam but remains neuro stable. NSGY to sign off after goals of care discussion.    Medications:  Continuous Infusions:   D10W   Intravenous Continuous PRN        propofol  10 mcg/kg/min Intravenous Continuous 5 mL/hr at 10/28/24 0605 10 mcg/kg/min at 10/28/24 0605     Scheduled Meds:   atorvastatin  40 mg Per OG tube Daily    cloBAZam  20 mg Per OG tube Q12H    famotidine  20 mg Per OG tube Daily    heparin (porcine)  5,000 Units Subcutaneous Q8H    insulin aspart U-100  10 Units Subcutaneous 6 times per day    insulin glargine U-100  28 Units Subcutaneous Daily    lacosamide (Vimpat) IV orderable  200 mg Intravenous Q12H    levETIRAcetam (Keppra) IV (PEDS and ADULTS)  1,500 mg Intravenous Q12H    piperacillin-tazobactam (Zosyn) IV (PEDS and ADULTS) (extended infusion is not appropriate)  4.5 g Intravenous Q8H    psyllium husk (aspartame)  1 packet Per OG tube TID    silodosin  4 mg Per OG tube Daily    vancomycin (VANCOCIN) IV (PEDS and ADULTS)  15 mg/kg Intravenous Q24H     PRN Meds:  Current Facility-Administered Medications:     acetaminophen, 650 mg, Per OG tube, Q6H PRN    D10W, , Intravenous, Continuous PRN    dextrose 10%, 12.5 g, Intravenous, PRN    dextrose 10%, 25 g, Intravenous, PRN    fentaNYL, 50 mcg, Intravenous, Q1H PRN    glucagon (human recombinant), 1 mg, Intramuscular, PRN    hydrALAZINE, 10 mg, Intravenous, Q4H PRN    insulin aspart U-100, 0-10 Units, Subcutaneous, Q4H PRN    labetalol, 10 mg, Intravenous, Q4H PRN    magnesium oxide, 800 mg, Per OG tube, PRN    magnesium oxide, 800 mg, Per OG tube, PRN    ondansetron, 4 mg, Intravenous, Q8H PRN    potassium bicarbonate, 35 mEq, Per OG tube, PRN    potassium bicarbonate, 50 mEq, Per OG tube, PRN    potassium bicarbonate, 60 mEq, Per OG tube, PRN    potassium, sodium phosphates, 2 packet, Per OG tube, PRN    potassium, sodium phosphates, 2  packet, Per OG tube, PRN    potassium, sodium phosphates, 2 packet, Per OG tube, PRN    Pharmacy to dose Vancomycin consult, , , Once **AND** vancomycin - pharmacy to dose, , Intravenous, pharmacy to manage frequency     Review of Systems  Objective:     Weight: 83.5 kg (184 lb)  Body mass index is 27.98 kg/m².  Vital Signs (Most Recent):  Temp: (!) 77 °F (25 °C) (10/28/24 0741)  Pulse: 90 (10/28/24 0741)  Resp: 19 (10/28/24 0741)  BP: (!) 105/52 (10/28/24 0605)  SpO2: 99 % (10/28/24 0741) Vital Signs (24h Range):  Temp:  [77 °F (25 °C)-99.3 °F (37.4 °C)] 77 °F (25 °C)  Pulse:  [79-90] 90  Resp:  [17-26] 19  SpO2:  [94 %-100 %] 99 %  BP: (105-148)/(52-67) 105/52  Arterial Line BP: (119-162)/(46-64) 119/46                Vent Mode: A/C  Oxygen Concentration (%):  [40] 40  Resp Rate Total:  [18 br/min-24 br/min] 18 br/min  Vt Set:  [500 mL] 500 mL  PEEP/CPAP:  [5 cmH20] 5 cmH20  Mean Airway Pressure:  [6.9 cmH20-8.5 cmH20] 8.5 cmH20             NG/OG Tube 10/23/24 2100 14 Fr. Center mouth (Active)   Placement Check placement verified by x-ray;placement verified by distal tube length measurement 10/28/24 0505   Tolerance no signs/symptoms of discomfort 10/27/24 1701   Securement secured to commercial device 10/28/24 0505   Clamp Status/Tolerance unclamped;no residual 10/28/24 0505   Suction Setting/Drainage Method suction at the bedside 10/28/24 0505   Insertion Site Appearance no redness, warmth, tenderness, skin breakdown, drainage 10/28/24 0505   Drainage None 10/25/24 1500   Flush/Irrigation flushed w/;water;no resistance met 10/28/24 0505   Feeding Type continuous;by pump 10/28/24 0505   Feeding Action feeding continued 10/28/24 0505   Current Rate (mL/hr) 50 mL/hr 10/27/24 0701   Goal Rate (mL/hr) 50 mL/hr 10/27/24 0701   Intake (mL) 60 mL 10/27/24 2205   Water Bolus (mL) 300 mL 10/26/24 1016   Formula Name Diabetisource 10/28/24 0505   Intake (mL) - Formula Tube Feeding 50 10/28/24 0605            Rectal Tube  "10/22/24 1718 rectal tube w/ balloon (indicate number of mLs) (Active)   Reposition drainage bags for BMS & Longoria on opposite sides of bed 10/27/24 1701   Outcome stool evacuated 10/28/24 0505   Stool Color Brown 10/28/24 0505   Insertion Site Appearance no redness, warmth, tenderness, skin breakdown, drainage 10/28/24 0505   Flush/Irrigation flushed w/;water;no resistance met 10/28/24 0505   Intake (mL) 50 mL 10/28/24 0205   Rectal Tube Output 100 mL 10/28/24 0543          Physical Exam         Neurosurgery Physical Exam  [id number]  PERRL  +L corneal, +cough, no gag  TF BLE   No movement RLE  No movement BUE    Significant Labs:  Recent Labs   Lab 10/27/24  0030 10/28/24  0029   * 236*    141   K 5.2* 5.1   * 109   CO2 22* 23   BUN 40* 41*   CREATININE 1.2 1.4   CALCIUM 8.4* 8.4*   MG 2.9* 2.8*     Recent Labs   Lab 10/27/24  0030 10/28/24  0029 10/28/24  0218   WBC 17.84* 15.34* 15.66*   HGB 7.8* 7.0* 7.1*   HCT 24.8* 23.5* 22.6*    165 172     No results for input(s): "LABPT", "INR", "APTT" in the last 48 hours.  Microbiology Results (last 7 days)       Procedure Component Value Units Date/Time    Culture, Respiratory with Gram Stain [[id number]] Collected: 10/27/24 1008    Order Status: Completed Specimen: Respiratory from Endotracheal Aspirate Updated: 10/27/24 1321     Gram Stain (Respiratory) <10 epithelial cells per low power field.     Gram Stain (Respiratory) Rare WBC's     Gram Stain (Respiratory) Rare yeast    Blood culture [[id number]] Collected: 10/18/24 0335    Order Status: Completed Specimen: Blood from Peripheral, Foot, Right Updated: 10/23/24 0612     Blood Culture, Routine No growth after 5 days.    Blood culture [[id number]] Collected: 10/18/24 0345    Order Status: Completed Specimen: Blood from Peripheral, Hand, Left Updated: 10/23/24 0612     Blood Culture, Routine No growth after 5 days.          All pertinent labs from the last 24 hours have been " reviewed.    Significant Diagnostics:  CT: No results found in the last 24 hours.  MRI: No results found in the last 24 hours.  I have reviewed all pertinent imaging results/findings within the past 24 hours.  I have reviewed and interpreted all pertinent imaging results/findings within the past 24 hours.

## 2024-10-28 NOTE — PLAN OF CARE
[facility] - Neuro Critical Care  Discharge Reassessment    Primary Care Provider: Kasie Edmondson MD    Expected Discharge Date: 10/29/2024    Reassessment (most recent)       Discharge Reassessment - 10/28/24 1542          Discharge Reassessment    Assessment Type Discharge Planning Reassessment (P)      Did the patient's condition or plan change since previous assessment? No (P)      Communicated TATI with patient/caregiver No (P)      Discharge Plan A Home with family (P)      Discharge Plan B Home (P)      DME Needed Upon Discharge  none (P)      Transition of Care Barriers None (P)      Why the patient remains in the hospital Requires continued medical care (P)         Post-Acute Status    Discharge Delays None known at this time (P)                    Pt is not medically ready to discharge. SW will continue to monitor pt needs.     Discharge Plan A and Plan B have been determined by review of patient's clinical status, future medical and therapeutic needs, and coverage/benefits for post-acute care in coordination with multidisciplinary team members.    Haleigh Perdomo MSW, LCSW  [facility]  Case Management Dept.

## 2024-10-28 NOTE — SUBJECTIVE & OBJECTIVE
Interval History:     NAEON. No seizures on EEG. Prop able to be turned off. Sputum cultures negative, will obtain blood and urine as well. NSGY to sign off.    Objective:     Vitals:  Temp: (!) 89.1 °F (31.7 °C)  Pulse: 88  Rhythm: atrial rhythm  BP: 132/63  MAP (mmHg): 91  Resp: 20  SpO2: 99 %  Oxygen Concentration (%): 40  Vent Mode: A/C  Set Rate: 16 BPM  Vt Set: 500 mL  PEEP/CPAP: 5 cmH20  Peak Airway Pressure: 21 cmH20  Mean Airway Pressure: 10 cmH20  Plateau Pressure: 13 cmH20    Temp  Min: 77 °F (25 °C)  Max: 100.5 °F (38.1 °C)  Pulse  Min: 80  Max: 95  BP  Min: 104/53  Max: 163/70  MAP (mmHg)  Min: 74  Max: 101  Resp  Min: 18  Max: 22  SpO2  Min: 97 %  Max: 100 %  Oxygen Concentration (%)  Min: 40  Max: 40    10/27 0701 - 10/28 0700  In: 2237.7 [I.V.:115.6]  Out: 1500 [Urine:1400]   Unmeasured Output  Urine Occurrence: 1  Stool Occurrence: 1  Pad Count: 1        Physical Exam  Constitutional:       General: He is not in acute distress.     Appearance: He is not toxic-appearing.   Cardiovascular:      Rate and Rhythm: Normal rate.      Comments: Monitor reading afib  Pulmonary:      Effort: Pulmonary effort is normal.   Abdominal:      General: There is no distension.      Palpations: Abdomen is soft.   Musculoskeletal:         General: No swelling.   Skin:     General: Skin is warm and dry.   Neurological:      Comments: Sedated; unable to obtain neuro exam              Medications:  [id number]    Scheduledatorvastatin, 40 mg, Daily  cloBAZam, 10 mg, Once  [START ON 10/29/2024] cloBAZam, 20 mg, QHS  famotidine, 20 mg, Daily  heparin (porcine), 5,000 Units, Q8H  insulin aspart U-100, 10 Units, 6 times per day  insulin glargine U-100, 28 Units, Daily  lacosamide (Vimpat) IV orderable, 200 mg, Q12H  levETIRAcetam (Keppra) IV (PEDS and ADULTS), 1,500 mg, Q12H  piperacillin-tazobactam (Zosyn) IV (PEDS and ADULTS) (extended infusion is not appropriate), 4.5 g, Q8H  psyllium husk (aspartame), 1 packet,  TID  silodosin, 4 mg, Daily  vancomycin (VANCOCIN) IV (PEDS and ADULTS), 15 mg/kg, Q24H    PRNacetaminophen, 650 mg, Q6H PRN  D10W, , Continuous PRN  dextrose 10%, 12.5 g, PRN  dextrose 10%, 25 g, PRN  fentaNYL, 50 mcg, Q1H PRN  glucagon (human recombinant), 1 mg, PRN  hydrALAZINE, 10 mg, Q4H PRN  insulin aspart U-100, 0-10 Units, Q4H PRN  labetalol, 10 mg, Q4H PRN  magnesium oxide, 800 mg, PRN  magnesium oxide, 800 mg, PRN  ondansetron, 4 mg, Q8H PRN  potassium bicarbonate, 35 mEq, PRN  potassium bicarbonate, 50 mEq, PRN  potassium bicarbonate, 60 mEq, PRN  potassium, sodium phosphates, 2 packet, PRN  potassium, sodium phosphates, 2 packet, PRN  potassium, sodium phosphates, 2 packet, PRN  vancomycin - pharmacy to dose, , pharmacy to manage frequency      Today I personally reviewed pertinent medications, lines/drains/airways, laboratory results, microbiology results,     Diet  Diet NPO Except for: Medication  Diet NPO Except for: Medication

## 2024-10-28 NOTE — ASSESSMENT & PLAN NOTE
2/2 R IPH s/p R supraorbital craniotomy for evacuation 10/18 and R craniotomy for SDH evacuation on 10/19  84M PMHx of HTN, HLD, DM2, CAD s/p CABG x2, Afib on Coumadin, ILD on home O2, HFrEF admitted to [facility] [place] as transfer from OSH For evaluation and management of large traumatic R frontal IPH now s/p R supraorbital craniotomy for hematoma evacuation 10/18 and R craniotomy for evacuation of post-op subdural hematoma 10/19. EEG initiated 10/21 showing right hemispheric subclinical seizures.    Recommendations:  - Continuous vEEG monitoring  - Recommend stopping Propofol 10>0  - Recommend to continue current AED regimen: Clobazam 20 mg BID, Lacosamide 200 mg BID, and Levetiracetam 1500 mg BID  - Avoid agents that lower seizure threshold  - Management of infectious/metabolic abnormalities per NCC  - Seizure precautions      Discussed plan of care with NCC team and daughter at bedside. Will follow, please call with questions.

## 2024-10-28 NOTE — ASSESSMENT & PLAN NOTE
SBP goal < 160  PRN labetalol and hydralazine   Propofol discontinued, will schedule home meds as needed for HTN

## 2024-10-28 NOTE — ASSESSMENT & PLAN NOTE
EEG placed morning of 10/21 due to delay in return to expected LOC  -Multiple seizures throughout the day  -Has been loaded with keppra and vimpat  -Addl seizure evening of 10/24, Onfi added on.   -Current AED regimen:   - Keppra 1500 BID   - Vimpat 200 BID   - Onfi 20 BID  -Propofol gtt weaned today with no additional seizures, will receive Onfi 10mg tonight and then will only receive 20mg nightly

## 2024-10-28 NOTE — ASSESSMENT & PLAN NOTE
84 y.o. 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 Given K-centra at OSH.     Taken to OR 10/18 for R frontal IPH evac via eyebrow supraorbital craniotomy. Unfortunately, interval development of large SDH on post op scan, taken back to OR emergently for acute SDH evacuation.    Imaging:  -CTH OSH 10/17: 5cm R frontal ICH with 7mm MLS and some intraventricular blood in posterior lateral vent   -CT Csp OSH 10/17: no acute processes   -rCTH 10/18: grossly stable   -CTA 10/18: post op clot evac with new holoconvexity R SDH, 12 mm MLS   -Post op CTH 10/18: s/p SDH evacuation with appropriate evac, CTA neg   -CTH 10/21: new/increased hyperdensity in the resection cavity without worsening mass effect or midline shift   -rCTH 10/21: stable  -MRI brain w/wo 10/25: no clear underlying mass      S/p R SDH evac on 10/18    Plan:  Patient admitted to ICU on telemetry, q1h neuro checks  Hold anti-plt/coag medications. Given K-centra for reversal at OSH. INR 1.2 on arrival  MRI brain w wo ordered without clear underlying mass, no significant amyloid  SBP <140   Na >135  Keppra 1g q12, vimpat, onfi and prop added  LEANDRO management per NCC  Transfuse RBC for goal Hgb >7  No HOB restrictions  Drains removed with stitch  WTE plan per NCC; GoC per NCC  Neurosurgery to sign off today following GoC discussion. Please contact with further questions.     Plan discussed with Dr. Condon    Dispmily: admitted to ICU

## 2024-10-28 NOTE — ASSESSMENT & PLAN NOTE
Initial presentation c/f HHS w glucose > 600. Beta hydroxybutyrate and urine ketones negative. Serum CO2 22. Given SQ insulin at OSH.  [place] [facility] [place]c 9.9% on admit.     Patient's FSGs are uncontrolled due to hyperglycemia on current medication regimen.  Last A1c reviewed-   Lab Results   Component Value Date    LABA1C 6.8 10/09/2017    HGBA1C 9.9 (H) 10/17/2024     Most recent fingerstick glucose reviewed-   Recent Labs   Lab 10/28/24  0029 10/28/24  0317 10/28/24  0753 10/28/24  1224   POCTGLUCOSE 252* 227* 224* 253*       Current correctional scale  Medium  Increase anti-hyperglycemic dose as follows-   Antihyperglycemics (From admission, onward)    Start     Stop Route Frequency Ordered    10/28/24 0900  insulin glargine U-100 (Lantus) pen 28 Units         -- SubQ Daily 10/28/24 0853    10/27/24 1600  insulin aspart U-100 pen 10 Units         -- SubQ 6 times per day 10/27/24 1312    10/25/24 1133  insulin aspart U-100 pen 0-10 Units         -- SubQ Every 4 hours PRN 10/25/24 1036    10/22/24 1400  insulin regular in 0.9 % NaCl 100 unit/100 mL (1 unit/mL) infusion        Question Answer Comment   Insulin Rate Adjustment (DO NOT MODIFY ANSWER) [email or web address]    Enter initial dose from Infusion Protocol Chart (Units/hr): 1.5        10/25/24 1431 IV Continuous 10/22/24 1252    10/21/24 1145  insulin regular in 0.9 % NaCl 100 unit/100 mL (1 unit/mL) infusion        Question:  Enter initial dose from Infusion Protocol Chart (Units/hr):  Answer:  1.65    10/21/24 1343 IV Continuous 10/21/24 1042        Hold Oral hypoglycemics while patient is in the hospital.

## 2024-10-28 NOTE — PLAN OF CARE
"[facility] Care Plan  POC reviewed with Percy Ramirez and family at 1400. Family verbalized understanding. Questions and concerns addressed. No acute events today. Pt progressing toward goals. Will continue to monitor. See below and flowsheets for full assessment and VS info.       Propofol d/c  Straight cath x1   UA and blood cultures obtained       Is this a stroke patient? no    Neuro:  [place] Coma Scale  Best Eye Response: 1-->(E1) none  Best Motor Response: 3-->(M3) flexion to pain  Best Verbal Response: 1-->(V1) none  [place] Coma Scale Score: 5  Assessment Qualifiers: patient intubated, patient chemically sedated or paralyzed  Pupil PERRLA: yes     24 hr Temp:  [77 °F (25 °C)-100.5 °F (38.1 °C)]     CV:   Rhythm: atrial rhythm  BP goals:   SBP < 160  MAP > 65    Resp:      Vent Mode: A/C  Set Rate: 16 BPM  Oxygen Concentration (%): 40  Vt Set: 500 mL  PEEP/CPAP: 5 cmH20  Pressure Support: 5 cmH20    Plan: wean to extubate    GI/:     Diet/Nutrition Received: tube feeding  Last Bowel Movement: 10/28/24  Voiding Characteristics: unable to void, other (see comments) (requires straight cath)    Intake/Output Summary (Last 24 hours) at 10/28/2024 1726  Last data filed at 10/28/2024 1724  Gross per 24 hour   Intake 1791.41 ml   Output 1500 ml   Net 291.41 ml     Unmeasured Output  Urine Occurrence: 1  Stool Occurrence: 1  Pad Count: 1    Labs/Accuchecks:  Recent Labs   Lab 10/28/24  0218   WBC 15.66*   RBC 2.54*   HGB 7.1*   HCT 22.6*         Recent Labs   Lab 10/28/24  0029      K 5.1   CO2 23      BUN 41*   CREATININE 1.4   ALKPHOS 92   ALT 12   AST 47*   BILITOT 0.4    No results for input(s): "PROTIME", "INR", "APTT", "HEPANTIXA" in the last 168 hours. No results for input(s): "CPK", "CPKMB", "TROPONINI", "MB" in the last 168 hours.    Electrolytes: N/A - electrolytes WDL  Accuchecks: Q4H    Gtts:   D10W   Intravenous Continuous PRN           LDA/Wounds:    Mykel Risk Assessment  Sensory " Perception: 2-->very limited  Moisture: 3-->occasionally moist  Activity: 1-->bedfast  Mobility: 2-->very limited  Nutrition: 3-->adequate  Friction and Shear: 2-->potential problem  Mykel Score: 13    Is your mykel score 12 or less? no      Nurses Note -- 4 Eyes    Is there altered skin present?  No  Second RN/Staff Member:  RADHA Perez       Restraints:   Restraint Order  Length of Order: Order good for next 24 hours or when removed.  Date that the current order will : 10/27/24  Time that the current order will :   Order Upon Application: Yes    [facility]   Problem: Infection  Goal: Absence of Infection Signs and Symptoms  Outcome: Progressing     Problem: Adult Inpatient Plan of Care  Goal: Plan of Care Review  Outcome: Progressing     Problem: Stroke, Intracerebral Hemorrhage  Goal: Optimal Coping  Outcome: Progressing  Goal: Effective Bowel Elimination  Outcome: Progressing  Goal: Optimal Cerebral Tissue Perfusion  Outcome: Progressing  Goal: Optimal Nutrition Intake  Outcome: Progressing  Goal: Improved Sensorimotor Function  Outcome: Progressing

## 2024-10-28 NOTE — PROGRESS NOTES
[facility]  Neurosurgery  Progress Note    Subjective:     History of Present Illness: Percy Ramirez is a 83 y.o. 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 Pt initially presented to  ED 2 days ago after mechanical fall and was found to have small SDH. Repeat scan was stable and patient was discharged home with outpatient follow up and instructions to hold his blood thinners. He re-presented to  ED yesterday after suffering another unwitnessed fall and family noticing him becoming more lethargic with c/o neck and head pain. Blood glucose >600 at OSH. CTH was indicative of 5cm right frontal IPC with 7mm midline shift. K-centra was given at OSH and pt was transferred to [facility] for higher level of care. Upon arrival pt INR was 1.2. Pt has waxing and waning exam, at times oriented and following commands and at other times lethargic, disoriented, and not responding. Further history limited due to patient's mental status change.    Post-Op Info:  Procedure(s) (LRB):  CRANIOTOMY, FOR SUBDURAL HEMATOMA EVACUATION (Right)   10 Days Post-Op   Interval History: 10/28/2024: MARILIN. LEENAVSS. Goals of care with [facility], no surgery going to be offered. Poor exam but remains neuro stable. NSGY to sign off after goals of care discussion.    Medications:  Continuous Infusions:   D10W   Intravenous Continuous PRN        propofol  10 mcg/kg/min Intravenous Continuous 5 mL/hr at 10/28/24 0605 10 mcg/kg/min at 10/28/24 0605     Scheduled Meds:   atorvastatin  40 mg Per OG tube Daily    cloBAZam  20 mg Per OG tube Q12H    famotidine  20 mg Per OG tube Daily    heparin (porcine)  5,000 Units Subcutaneous Q8H    insulin aspart U-100  10 Units Subcutaneous 6 times per day    insulin glargine U-100  28 Units Subcutaneous Daily    lacosamide (Vimpat) IV orderable  200 mg Intravenous Q12H    levETIRAcetam (Keppra)  IV (PEDS and ADULTS)  1,500 mg Intravenous Q12H    piperacillin-tazobactam (Zosyn) IV (PEDS and ADULTS) (extended infusion is not appropriate)  4.5 g Intravenous Q8H    psyllium husk (aspartame)  1 packet Per OG tube TID    silodosin  4 mg Per OG tube Daily    vancomycin (VANCOCIN) IV (PEDS and ADULTS)  15 mg/kg Intravenous Q24H     PRN Meds:  Current Facility-Administered Medications:     acetaminophen, 650 mg, Per OG tube, Q6H PRN    D10W, , Intravenous, Continuous PRN    dextrose 10%, 12.5 g, Intravenous, PRN    dextrose 10%, 25 g, Intravenous, PRN    fentaNYL, 50 mcg, Intravenous, Q1H PRN    glucagon (human recombinant), 1 mg, Intramuscular, PRN    hydrALAZINE, 10 mg, Intravenous, Q4H PRN    insulin aspart U-100, 0-10 Units, Subcutaneous, Q4H PRN    labetalol, 10 mg, Intravenous, Q4H PRN    magnesium oxide, 800 mg, Per OG tube, PRN    magnesium oxide, 800 mg, Per OG tube, PRN    ondansetron, 4 mg, Intravenous, Q8H PRN    potassium bicarbonate, 35 mEq, Per OG tube, PRN    potassium bicarbonate, 50 mEq, Per OG tube, PRN    potassium bicarbonate, 60 mEq, Per OG tube, PRN    potassium, sodium phosphates, 2 packet, Per OG tube, PRN    potassium, sodium phosphates, 2 packet, Per OG tube, PRN    potassium, sodium phosphates, 2 packet, Per OG tube, PRN    Pharmacy to dose Vancomycin consult, , , Once **AND** vancomycin - pharmacy to dose, , Intravenous, pharmacy to manage frequency     Review of Systems  Objective:     Weight: 83.5 kg (184 lb)  Body mass index is 27.98 kg/m².  Vital Signs (Most Recent):  Temp: (!) 77 °F (25 °C) (10/28/24 0741)  Pulse: 90 (10/28/24 0741)  Resp: 19 (10/28/24 0741)  BP: (!) 105/52 (10/28/24 0605)  SpO2: 99 % (10/28/24 0741) Vital Signs (24h Range):  Temp:  [77 °F (25 °C)-99.3 °F (37.4 °C)] 77 °F (25 °C)  Pulse:  [79-90] 90  Resp:  [17-26] 19  SpO2:  [94 %-100 %] 99 %  BP: (105-148)/(52-67) 105/52  Arterial Line BP: (119-162)/(46-64) 119/46                Vent Mode: A/C  Oxygen  Concentration (%):  [40] 40  Resp Rate Total:  [18 br/min-24 br/min] 18 br/min  Vt Set:  [500 mL] 500 mL  PEEP/CPAP:  [5 cmH20] 5 cmH20  Mean Airway Pressure:  [6.9 cmH20-8.5 cmH20] 8.5 cmH20             NG/OG Tube 10/23/24 2100 14 Fr. Center mouth (Active)   Placement Check placement verified by x-ray;placement verified by distal tube length measurement 10/28/24 0505   Tolerance no signs/symptoms of discomfort 10/27/24 1701   Securement secured to commercial device 10/28/24 0505   Clamp Status/Tolerance unclamped;no residual 10/28/24 0505   Suction Setting/Drainage Method suction at the bedside 10/28/24 0505   Insertion Site Appearance no redness, warmth, tenderness, skin breakdown, drainage 10/28/24 0505   Drainage None 10/25/24 1500   Flush/Irrigation flushed w/;water;no resistance met 10/28/24 0505   Feeding Type continuous;by pump 10/28/24 0505   Feeding Action feeding continued 10/28/24 0505   Current Rate (mL/hr) 50 mL/hr 10/27/24 0701   Goal Rate (mL/hr) 50 mL/hr 10/27/24 0701   Intake (mL) 60 mL 10/27/24 2205   Water Bolus (mL) 300 mL 10/26/24 1016   Formula Name Diabetisource 10/28/24 0505   Intake (mL) - Formula Tube Feeding 50 10/28/24 0605            Rectal Tube 10/22/24 1718 rectal tube w/ balloon (indicate number of mLs) (Active)   Reposition drainage bags for BMS & Longoria on opposite sides of bed 10/27/24 1701   Outcome stool evacuated 10/28/24 0505   Stool Color Brown 10/28/24 0505   Insertion Site Appearance no redness, warmth, tenderness, skin breakdown, drainage 10/28/24 0505   Flush/Irrigation flushed w/;water;no resistance met 10/28/24 0505   Intake (mL) 50 mL 10/28/24 0205   Rectal Tube Output 100 mL 10/28/24 0543          Physical Exam         Neurosurgery Physical Exam  [id number]  PERRL  +L corneal, +cough, no gag  TF BLE   No movement RLE  No movement BUE    Significant Labs:  Recent Labs   Lab 10/27/24  0030 10/28/24  0029   * 236*    141   K 5.2* 5.1   * 109   CO2 22* 23  "  BUN 40* 41*   CREATININE 1.2 1.4   CALCIUM 8.4* 8.4*   MG 2.9* 2.8*     Recent Labs   Lab 10/27/24  0030 10/28/24  0029 10/28/24  0218   WBC 17.84* 15.34* 15.66*   HGB 7.8* 7.0* 7.1*   HCT 24.8* 23.5* 22.6*    165 172     No results for input(s): "LABPT", "INR", "APTT" in the last 48 hours.  Microbiology Results (last 7 days)       Procedure Component Value Units Date/Time    Culture, Respiratory with Gram Stain [[id number]] Collected: 10/27/24 1008    Order Status: Completed Specimen: Respiratory from Endotracheal Aspirate Updated: 10/27/24 1321     Gram Stain (Respiratory) <10 epithelial cells per low power field.     Gram Stain (Respiratory) Rare WBC's     Gram Stain (Respiratory) Rare yeast    Blood culture [[id number]] Collected: 10/18/24 0335    Order Status: Completed Specimen: Blood from Peripheral, Foot, Right Updated: 10/23/24 0612     Blood Culture, Routine No growth after 5 days.    Blood culture [[id number]] Collected: 10/18/24 0345    Order Status: Completed Specimen: Blood from Peripheral, Hand, Left Updated: 10/23/24 0612     Blood Culture, Routine No growth after 5 days.          All pertinent labs from the last 24 hours have been reviewed.    Significant Diagnostics:  CT: No results found in the last 24 hours.  MRI: No results found in the last 24 hours.  I have reviewed all pertinent imaging results/findings within the past 24 hours.  I have reviewed and interpreted all pertinent imaging results/findings within the past 24 hours.  Assessment/Plan:     * Traumatic right-sided intracerebral hemorrhage without loss of consciousness  84 y.o. 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 Given K-centra at OSH.     Taken to OR 10/18 for R frontal IPH evac via eyebrow supraorbital craniotomy. Unfortunately, interval development of large SDH on post op scan, taken back to OR emergently for acute SDH evacuation.    Imaging:  -CTH OSH " 10/17: 5cm R frontal ICH with 7mm MLS and some intraventricular blood in posterior lateral vent   -CT Csp OSH 10/17: no acute processes   -[facility] 10/18: grossly stable   -CTA 10/18: post op clot evac with new holoconvexity R SDH, 12 mm MLS   -Post op CTH 10/18: s/p SDH evacuation with appropriate evac, CTA neg   -CTH 10/21: new/increased hyperdensity in the resection cavity without worsening mass effect or midline shift   -[facility] 10/21: stable  -MRI brain w/wo 10/25: no clear underlying mass      S/p R SDH evac on 10/18    Plan:  Patient admitted to ICU on telemetry, q1h neuro checks  Hold anti-plt/coag medications. Given K-centra for reversal at OSH. INR 1.2 on arrival  MRI brain w wo ordered without clear underlying mass, no significant amyloid  SBP <140   Na >135  Keppra 1g q12, vimpat, onfi and prop added  LEANDRO management per NCC  Transfuse RBC for goal Hgb >7  No HOB restrictions  Drains removed with stitch  WTE plan per NCC; GoC per NCC  Neurosurgery to sign off today following GoC discussion. Please contact with further questions.     Plan discussed with Dr. Condon    Dispo: admitted to ICU        Gaurav Velásquez MD  Neurosurgery  [facility]

## 2024-10-28 NOTE — PROGRESS NOTES
[facility]  Neurology-Epilepsy  Progress Note    Patient Name: Percy Ramirez  MRN: [id number]  Admission Date: 10/17/2024  Hospital Length of Stay: 11 days  Code Status: Full Code   Attending Provider: Nam Reynoso MD  Primary Care Physician: Kasie Edmondson MD   Principal Problem:Focal seizures    Subjective:     Hospital Course:   10/21>10/22 EEG: Recurrent right hemispheric seizures with significant improvement noted in frequency after Propofol initiated 10/21 pm.  10/22>10/23 EEG: Highly epileptogenic right hemispheric structural lesion and severe diffuse encephalopathy, 11 subclinical seizures arising from the right parietal/temporal region   10/23>10/24 EEG: No further seizures after escalation of Propofol on 10/23, highly epileptogenic right hemispheric structural lesion and a severe diffuse encephalopathy  10/24>10/25 EEG: Highly epileptogenic right hemispheric structural lesion and a severe diffuse encephalopathy, one subclinical seizure at 19:14 on 10/24  OFF FOR MRI  10/26>10/27 EE electrographic right onset seizure after Propofol discontinued, burst suppression pattern with epileptiform bursts  10/27>10/28 EEG: suppressed, no electrographic seizures    See EEG reports for details.    Interval History: No seizures on EEG. Plan to stop Propofol. Updated daughter at bedside, answered questions.    Current Facility-Administered Medications   Medication Dose Route Frequency Provider Last Rate Last Admin    acetaminophen tablet 650 mg  650 mg Per OG tube Q6H PRN Saman Gonzalez MD   650 mg at 10/26/24 1448    atorvastatin tablet 40 mg  40 mg Per OG tube Daily Christina Em, NP   40 mg at 10/28/24 0852    cloBAZam Tab 20 mg  20 mg Per OG tube Q12H Dolores Moore DO   20 mg at 10/28/24 0852    dextrose 10 % infusion   Intravenous Continuous PRN Dolores Moore DO        dextrose 10% bolus 125 mL 125 mL  12.5 g Intravenous PRN Dolores Moore DO        dextrose 10% bolus 250 mL  250 mL  25 g Intravenous PRN Dolores Moore,         famotidine tablet 20 mg  20 mg Per OG tube Daily Saman Gonzalez MD   20 mg at 10/28/24 0852    fentaNYL injection 50 mcg  50 mcg Intravenous Q1H PRN Christina Hess NP   50 mcg at 10/27/24 1338    glucagon (human recombinant) injection 1 mg  1 mg Intramuscular PRN Dolores Moore DO        heparin (porcine) injection 5,000 Units  5,000 Units Subcutaneous Q8H Khris Cote MD   5,000 Units at 10/28/24 0511    hydrALAZINE injection 10 mg  10 mg Intravenous Q4H PRN Dolores Moore DO   10 mg at 10/26/24 1832    insulin aspart U-100 pen 0-10 Units  0-10 Units Subcutaneous Q4H PRN Dolores Moore DO   4 Units at 10/28/24 0755    insulin aspart U-100 pen 10 Units  10 Units Subcutaneous 6 times per day Khris Cote MD   10 Units at 10/28/24 0754    insulin glargine U-100 (Lantus) pen 28 Units  28 Units Subcutaneous Daily Dolores Moore DO   28 Units at 10/28/24 0938    labetalol 20 mg/4 mL (5 mg/mL) IV syring  10 mg Intravenous Q4H PRN Dolores Moore DO   10 mg at 10/26/24 2047    lacosamide injection 200 mg  200 mg Intravenous Q12H Dolores Moore DO   200 mg at 10/28/24 0940    levETIRAcetam injection 1,500 mg  1,500 mg Intravenous Q12H Khris Cote MD   1,500 mg at 10/28/24 0745    magnesium oxide split tablet 800 mg  800 mg Per OG tube PRN Radha Yan PA-C        magnesium oxide split tablet 800 mg  800 mg Per OG tube PRN Radha Yan PA-C        ondansetron injection 4 mg  4 mg Intravenous Q8H PRN Angel Nunez PA-C        piperacillin-tazobactam (ZOSYN) 4.5 g in D5W 100 mL IVPB (MB+)  4.5 g Intravenous Q8H Shalini Ruiz NP   Stopped at 10/28/24 0715    potassium bicarbonate disintegrating tablet 35 mEq  35 mEq Per OG tube PRN Radha Yan PA-C        potassium bicarbonate disintegrating tablet 50 mEq  50 mEq Per OG tube PRN Radha Yan PA-C        potassium bicarbonate disintegrating tablet 60 mEq  60 mEq Per OG tube PRN  Radha Yan PA-C        potassium, sodium phosphates 280-160-250 mg packet 2 packet  2 packet Per OG tube PRN Radha Yan PA-C        potassium, sodium phosphates 280-160-250 mg packet 2 packet  2 packet Per OG tube PRN Radha Yan PA-C   2 packet at 10/23/24 1220    potassium, sodium phosphates 280-160-250 mg packet 2 packet  2 packet Per OG tube PRN Radha Yan PA-C        psyllium husk (aspartame) 3.4 gram packet 1 packet  1 packet Per OG tube TID Shalini Ruiz NP   1 packet at 10/28/24 0852    silodosin capsule 4 mg  4 mg Per OG tube Daily Radha Yan PA-C   4 mg at 10/28/24 0852    vancomycin - pharmacy to dose   Intravenous pharmacy to manage frequency MiineaShalini, NP        vancomycin 1,250 mg in D5W 250 mL IVPB (admixture device)  15 mg/kg Intravenous Q24H Nam Reynoso MD         Continuous Infusions:   D10W   Intravenous Continuous PRN           Review of Systems  Objective:     Vital Signs (Most Recent):  Temp: 97.9 °F (36.6 °C) (10/28/24 0901)  Pulse: 87 (10/28/24 0901)  Resp: (!) 21 (10/28/24 0901)  BP: (!) 104/53 (10/28/24 0901)  SpO2: 98 % (10/28/24 0901) Vital Signs (24h Range):  Temp:  [77 °F (25 °C)-99.3 °F (37.4 °C)] 97.9 °F (36.6 °C)  Pulse:  [79-90] 87  Resp:  [17-26] 21  SpO2:  [94 %-100 %] 98 %  BP: (104-148)/(52-67) 104/53  Arterial Line BP: (119-157)/(46-63) 126/52     Weight: 83.5 kg (184 lb)  Body mass index is 27.98 kg/m².     Physical Exam  Constitutional:       General: He is not in acute distress.     Appearance: He is not diaphoretic.      Interventions: He is sedated and intubated.   HENT:      Head: Normocephalic.      Comments: EEG in place  Eyes:      General: No scleral icterus.        Right eye: No discharge.         Left eye: No discharge.      Conjunctiva/sclera: Conjunctivae normal.      Pupils: Pupils are equal, round, and reactive to light.   Cardiovascular:      Rate and Rhythm: Normal rate.   Pulmonary:      Effort: Pulmonary  effort is normal. No respiratory distress. He is intubated.      Comments: Intubated  Skin:     General: Skin is warm and dry.   Psychiatric:      Comments: Unable to assess            NEUROLOGICAL EXAMINATION:     CRANIAL NERVES     CN III, IV, VI   Pupils are equal, round, and reactive to light.       Comatose   AQUILINO OU   Corneals absent  No spontaneous eye opening   Face symmetric   Tongue midline   Does not withdraw to noxious stimuli    Exam findings to suggest seizures:  Myoclonus - no   eye twitching - no   Nystagmus - no   gaze deviation - no   waxy rigidity - no        Significant Labs: All pertinent lab results from the past 24 hours have been reviewed.    Significant Studies: I have reviewed all pertinent imaging results/findings within the past 24 hours.  Assessment and Plan:     * Focal seizures  84M PMHx of HTN, HLD, DM2, CAD s/p CABG x2, Afib on Coumadin, ILD on home O2, HFrEF admitted to [facility] [place] as transfer from OSH for evaluation and management of large traumatic R frontal IPH now s/p R supraorbital craniotomy for hematoma evacuation 10/18 and R craniotomy for evacuation of post-op subdural hematoma 10/19. EEG initiated 10/21 showing right hemispheric subclinical seizures.    Recommendations:  - Continuous vEEG monitoring  - Recommend stopping Propofol 10>0  - Recommend to continue current AED regimen: Clobazam 20 mg BID, Lacosamide 200 mg BID, and Levetiracetam 1500 mg BID  - Avoid agents that lower seizure threshold  - Management of infectious/metabolic abnormalities per NCC  - Seizure precautions      Discussed plan of care with NCC team and daughter at bedside. Will follow, please call with questions.    Traumatic right-sided intracerebral hemorrhage without loss of consciousness  SDH  ICH  Transfer from OSH for evaluation and management of large traumatic R frontal IPH now s/p R supraorbital craniotomy for hematoma evacuation 10/18 and R craniotomy for evacuation of post-op subdural hematoma  10/19  - Drain removal 10/25 per NSGY  - Planning for GOC today  - Management per NSGY, [facility]    Chronic kidney disease, stage 3a  - Chronic and stable  - Management per [facility]    ILD (interstitial lung disease)  - Chronic, on home O2 as outpatient  - Currently intubated and sedated  - Vent management per [facility]    Persistent atrial fibrillation  - On Warfarin as outpatient, on hold in setting of acute IPH  - Management per [facility]    Major depressive disorder, recurrent episode, mild  - Chronic  - SSRI held per [facility]  - Management per [facility]        VTE Risk Mitigation (From admission, onward)           Ordered     heparin (porcine) injection 5,000 Units  Every 8 hours         10/22/24 1121     IP VTE HIGH RISK PATIENT  Once         10/17/24 2326     Place sequential compression device  Until discontinued         10/17/24 2326     Reason for No Pharmacological VTE Prophylaxis  Once        Question:  Reasons:  Answer:  Active Bleeding    10/17/24 2326                    Brigitte Carrion PA-C  Neurology-Epilepsy  [facility]  Staff: Dr. Trammell

## 2024-10-28 NOTE — PLAN OF CARE
"[facility] Care Plan    POC reviewed with Percy Ramirez at 0300. Patient unable to verbalize understanding.  No acute events today. Pt progressing toward goals. Will continue to monitor. See below and flowsheets for full assessment and VS info.     -Propofol at 10 mcg/kg/min   -Straightcath x1      Is this a stroke patient? no    Neuro:  Federico Coma Scale  Best Eye Response: 1-->(E1) none  Best Motor Response: 3-->(M3) flexion to pain  Best Verbal Response: 1-->(V1) none  Bradford Coma Scale Score: 5  Assessment Qualifiers: patient intubated  Pupil PERRLA: yes     24 hr Temp:  [97 °F (36.1 °C)-99.3 °F (37.4 °C)]     CV:   Rhythm: atrial rhythm  BP goals:   SBP < 160  MAP > 65    Resp:      Vent Mode: A/C  Set Rate: 16 BPM  Oxygen Concentration (%): 40  Vt Set: 500 mL  PEEP/CPAP: 5 cmH20  Pressure Support: 5 cmH20    Plan: N/A    GI/:     Diet/Nutrition Received: tube feeding  Last Bowel Movement: 10/28/24  Voiding Characteristics: unable to void    Intake/Output Summary (Last 24 hours) at 10/28/2024 0639  Last data filed at 10/28/2024 0605  Gross per 24 hour   Intake 2297.32 ml   Output 1500 ml   Net 797.32 ml     Unmeasured Output  Urine Occurrence: 1  Stool Occurrence: 1  Pad Count: 1    Labs/Accuchecks:  Recent Labs   Lab 10/28/24  0218   WBC 15.66*   RBC 2.54*   HGB 7.1*   HCT 22.6*         Recent Labs   Lab 10/28/24  0029      K 5.1   CO2 23      BUN 41*   CREATININE 1.4   ALKPHOS 92   ALT 12   AST 47*   BILITOT 0.4    No results for input(s): "PROTIME", "INR", "APTT", "HEPANTIXA" in the last 168 hours. No results for input(s): "CPK", "CPKMB", "TROPONINI", "MB" in the last 168 hours.    Electrolytes: N/A - electrolytes WDL  Accuchecks: Q4H    Gtts:   D10W   Intravenous Continuous PRN        propofol  10 mcg/kg/min Intravenous Continuous 5 mL/hr at 10/28/24 0605 10 mcg/kg/min at 10/28/24 0605       LDA/Wounds:    Mykel Risk Assessment  Sensory Perception: 2-->very limited  Moisture: " 3-->occasionally moist  Activity: 1-->bedfast  Mobility: 2-->very limited  Nutrition: 3-->adequate  Friction and Shear: 3-->no apparent problem  Mykel Score: 14  Is your mykel score 12 or less? no    Nurses Note -- 4 Eyes    Is there altered skin present?  No  Second RN/Staff Member:  RADHA Alomnte        [facility]

## 2024-10-28 NOTE — ASSESSMENT & PLAN NOTE
Elevated WBC 17.84 today. Procal elevated at 0.82.  - Repeat procal pending at .74  - Vanc/Zosyn  - Respiratory cx pending

## 2024-10-28 NOTE — PROCEDURES
ICU EEG/VIDEO MONITORING REPORT    DATE OF SERVICE: 10/27/24-10/28/24  EEG NUMBER: [id number]  [facility]    METHODOLOGY   Electroencephalographic (EEG) recording is with electrodes placed according to the International 10-20 placement system.  Thirty two (32) channels of digital signal are simultaneously recorded from the scalp and may include EKG, EMG, and/or eye monitors.   Recording band pass was 0.1 to 512 hz.  Digital video recording of the patient is simultaneously recorded with the EEG.  The nursing staff report clinical symptoms and may press an event button when the patient has symptoms of clinical interest to the treating physicians.  EEG and video recording is stored and archived in digital format.  The entire recording is visually reviewed and the times identified by computer analysis as being spikes or seizures are reviewed again.  Activation procedures which include photic stimulation, hyperventilation and instructing patients to perform simple task are done in selected patients.   Compresses spectral analysis (CSA) is also performed on the activity recorded from each individual channel.  This is displayed as a power display of frequencies from 0 to 30 Hz over time.   The CSA analysis is done and displayed continuously.  This is reviewed for asymmetries in power between homologous areas of the scalp and for presence of changes in power which canbe seen when seizures occur.  Sections of suspected abnormalities on the CSA is then compared with the original EEG recording.     [employer] software was also utilized in the review of this study.  This software suite analyzes the EEG recording in multiple domains.  Coherence and rhythmicity is computed to identify EEG sections which may contain organized seizures.  Each channel undergoes analysis to detect presence of spike and sharp waves which have special and morphological characteristic of epileptic activity.  The routine EEG recording  is converted from spacial into frequency domain.  This is then displayed comparing homologous areas to identify areas of significant asymmetry.  Algorithm to identify non-cortically generated artifact is used to separate eye movement, EMG and other artifact from the EEG.      Recording Times  Start on 10/27/24 at 07:00  Stop on 10/28/24 at 07:00  24 hours EEG recorded    This EEG study is performed in the ICU with the patient on the following sedative medications: propofol 10 mKm    Indication: 83 y.o. male with a past medical history of CABG x2 on Coumadin, HTN, T2DM, HLD who was transferred from [facility] for management of large traumatic right frontal ICH.     State of Consciousness:   Coma    Background:   The background is burst suppressed, with periods of suppression lasting 3-7 seconds and bursts consisting of sharply contoured theta/delta activity.      Sleep:   The patient is in a comatose state throughout the record, with no normal sleep architecture appreciated    Epileptiform Abnormalities  Occasional right hemispheric sharps are noted (Fp2, P4/T6).      EKG:   Irregular rhythm    Seizures/Events:   None    Impression:   Abnormal study demonstrating a burst suppression pattern secondary to anesthetic medications as well as intermittent epileptiform activity in the right hemisphere consistent with a region of cortical irritability and seizure potential.  No seizures are recorded.       Mayra Trammell MD  [facility]   Department of Neurology/Epilepsy

## 2024-10-28 NOTE — ASSESSMENT & PLAN NOTE
[facility]  ICH  Transfer from OSH for evaluation and management of large traumatic R frontal IPH now s/p R supraorbital craniotomy for hematoma evacuation 10/18 and R craniotomy for evacuation of post-op subdural hematoma 10/19  - Drain removal 10/25 per NSGY  - Planning for GOC today  - Management per NSGY, NCC

## 2024-10-28 NOTE — ASSESSMENT & PLAN NOTE
Cr 1.8 on initial presentation to ED, increased to 2.6, LEANDRO now resolved    Creatine stable for now. BMP reviewed- noted Estimated Creatinine Clearance: 42.8 mL/min (based on SCr of 1.4 mg/dL). according to latest data. Based on current GFR, CKD stage is stage 3 - GFR 30-59.  Monitor UOP and serial BMP and adjust therapy as needed. Renally dose meds. Avoid nephrotoxic medications and procedures.

## 2024-10-28 NOTE — ASSESSMENT & PLAN NOTE
LEANDRO on CKD, Cr 1.8 on initial presentation to ED    Recent Labs     10/26/24  0032 10/27/24  0030 10/28/24  0029   CREATININE 1.2 1.2 1.4   EGFRNORACEVR >60.0 >60.0 50.2*        Plan  - LEANDRO is resolved  - Avoid nephrotoxins and renally dose meds for GFR listed above  - Monitor urine output, serial BMP, and adjust therapy as needed  - Q1H I&Os

## 2024-10-28 NOTE — SUBJECTIVE & OBJECTIVE
Interval History: No seizures on EEG. Plan to stop Propofol. Updated daughter at bedside, answered questions.    Current Facility-Administered Medications   Medication Dose Route Frequency Provider Last Rate Last Admin    acetaminophen tablet 650 mg  650 mg Per OG tube Q6H PRN Saman Gonzalez MD   650 mg at 10/26/24 1448    atorvastatin tablet 40 mg  40 mg Per OG tube Daily Christina Em NP   40 mg at 10/28/24 0852    cloBAZam Tab 20 mg  20 mg Per OG tube Q12H Dolores Moore DO   20 mg at 10/28/24 0852    dextrose 10 % infusion   Intravenous Continuous PRN Dolores Moore DO        dextrose 10% bolus 125 mL 125 mL  12.5 g Intravenous PRN Dolores Moore DO        dextrose 10% bolus 250 mL 250 mL  25 g Intravenous PRN Dolores Moore DO        famotidine tablet 20 mg  20 mg Per OG tube Daily Saman Gonzalez MD   20 mg at 10/28/24 0852    fentaNYL injection 50 mcg  50 mcg Intravenous Q1H PRN Christina Hess NP   50 mcg at 10/27/24 1338    glucagon (human recombinant) injection 1 mg  1 mg Intramuscular PRN Dolores Moore DO        heparin (porcine) injection 5,000 Units  5,000 Units Subcutaneous Q8H Khris Cote MD   5,000 Units at 10/28/24 0511    hydrALAZINE injection 10 mg  10 mg Intravenous Q4H PRN Dolores Moore DO   10 mg at 10/26/24 1832    insulin aspart U-100 pen 0-10 Units  0-10 Units Subcutaneous Q4H PRN Dolores Moore DO   4 Units at 10/28/24 0755    insulin aspart U-100 pen 10 Units  10 Units Subcutaneous 6 times per day Khris Cote MD   10 Units at 10/28/24 0754    insulin glargine U-100 (Lantus) pen 28 Units  28 Units Subcutaneous Daily Dolores Moore DO   28 Units at 10/28/24 0938    labetalol 20 mg/4 mL (5 mg/mL) IV syring  10 mg Intravenous Q4H PRN Dolores Moore DO   10 mg at 10/26/24 2047    lacosamide injection 200 mg  200 mg Intravenous Q12H Dolores Moore DO   200 mg at 10/28/24 0940    levETIRAcetam injection 1,500 mg  1,500 mg Intravenous Q12H Khris Cote MD   1,500 mg at  10/28/24 0745    magnesium oxide split tablet 800 mg  800 mg Per OG tube PRN Radha Yan PA-C        magnesium oxide split tablet 800 mg  800 mg Per OG tube PRN Radha Yan PA-C        ondansetron injection 4 mg  4 mg Intravenous Q8H PRN Angel Nunez PA-C        piperacillin-tazobactam (ZOSYN) 4.5 g in D5W 100 mL IVPB (MB+)  4.5 g Intravenous Q8H Shalini Ruiz NP   Stopped at 10/28/24 0715    potassium bicarbonate disintegrating tablet 35 mEq  35 mEq Per OG tube PRN Radha Yan PA-C        potassium bicarbonate disintegrating tablet 50 mEq  50 mEq Per OG tube PRN Radha Yan, TISRO        potassium bicarbonate disintegrating tablet 60 mEq  60 mEq Per OG tube PRN Radha Yan PA-C        potassium, sodium phosphates 280-160-250 mg packet 2 packet  2 packet Per OG tube PRN Radha Yan PA-C        potassium, sodium phosphates 280-160-250 mg packet 2 packet  2 packet Per OG tube PRN Radha Yan PA-C   2 packet at 10/23/24 1220    potassium, sodium phosphates 280-160-250 mg packet 2 packet  2 packet Per OG tube PRN Radha Yan PA-C        psyllium husk (aspartame) 3.4 gram packet 1 packet  1 packet Per OG tube TID Shalini Ruiz NP   1 packet at 10/28/24 0852    silodosin capsule 4 mg  4 mg Per OG tube Daily Radha Yan PA-C   4 mg at 10/28/24 0852    vancomycin - pharmacy to dose   Intravenous pharmacy to manage frequency Shalini Ruiz NP        vancomycin 1,250 mg in D5W 250 mL IVPB (admixture device)  15 mg/kg Intravenous Q24H Nam Reynoso MD         Continuous Infusions:   D10W   Intravenous Continuous PRN           Review of Systems  Objective:     Vital Signs (Most Recent):  Temp: 97.9 °F (36.6 °C) (10/28/24 0901)  Pulse: 87 (10/28/24 0901)  Resp: (!) 21 (10/28/24 0901)  BP: (!) 104/53 (10/28/24 0901)  SpO2: 98 % (10/28/24 0901) Vital Signs (24h Range):  Temp:  [77 °F (25 °C)-99.3 °F (37.4 °C)] 97.9 °F (36.6 °C)  Pulse:  [79-90] 87  Resp:   [17-26] 21  SpO2:  [94 %-100 %] 98 %  BP: (104-148)/(52-67) 104/53  Arterial Line BP: (119-157)/(46-63) 126/52     Weight: 83.5 kg (184 lb)  Body mass index is 27.98 kg/m².     Physical Exam  Constitutional:       General: He is not in acute distress.     Appearance: He is not diaphoretic.      Interventions: He is sedated and intubated.   HENT:      Head: Normocephalic.      Comments: EEG in place  Eyes:      General: No scleral icterus.        Right eye: No discharge.         Left eye: No discharge.      Conjunctiva/sclera: Conjunctivae normal.      Pupils: Pupils are equal, round, and reactive to light.   Cardiovascular:      Rate and Rhythm: Normal rate.   Pulmonary:      Effort: Pulmonary effort is normal. No respiratory distress. He is intubated.      Comments: Intubated  Skin:     General: Skin is warm and dry.   Psychiatric:      Comments: Unable to assess            NEUROLOGICAL EXAMINATION:     CRANIAL NERVES     CN III, IV, VI   Pupils are equal, round, and reactive to light.       Comatose   AQUILINO OU   Corneals absent  No spontaneous eye opening   Face symmetric   Tongue midline   Does not withdraw to noxious stimuli    Exam findings to suggest seizures:  Myoclonus - no   eye twitching - no   Nystagmus - no   gaze deviation - no   waxy rigidity - no        Significant Labs: All pertinent lab results from the past 24 hours have been reviewed.    Significant Studies: I have reviewed all pertinent imaging results/findings within the past 24 hours.

## 2024-10-29 LAB
ALBUMIN SERPL BCP-MCNC: 1.9 G/DL (ref 3.5–5.2)
ALLENS TEST: ABNORMAL
ALP SERPL-CCNC: 101 U/L (ref 40–150)
ALT SERPL W/O P-5'-P-CCNC: 15 U/L (ref 10–44)
ANION GAP SERPL CALC-SCNC: 10 MMOL/L (ref 8–16)
ANISOCYTOSIS BLD QL SMEAR: SLIGHT
AST SERPL-CCNC: 42 U/L (ref 10–40)
BACTERIA SPEC AEROBE CULT: ABNORMAL
BACTERIA SPEC AEROBE CULT: ABNORMAL
BASOPHILS NFR BLD: 0 % (ref 0–1.9)
BILIRUB SERPL-MCNC: 0.4 MG/DL (ref 0.1–1)
BUN SERPL-MCNC: 45 MG/DL (ref 8–23)
CALCIUM SERPL-MCNC: 8.7 MG/DL (ref 8.7–10.5)
CHLORIDE SERPL-SCNC: 109 MMOL/L (ref 95–110)
CO2 SERPL-SCNC: 24 MMOL/L (ref 23–29)
CREAT SERPL-MCNC: 1.6 MG/DL (ref 0.5–1.4)
DELSYS: ABNORMAL
DIFFERENTIAL METHOD BLD: ABNORMAL
DOHLE BOD BLD QL SMEAR: PRESENT
EOSINOPHIL NFR BLD: 7 % (ref 0–8)
ERYTHROCYTE [DISTWIDTH] IN BLOOD BY AUTOMATED COUNT: 16.9 % (ref 11.5–14.5)
ERYTHROCYTE [SEDIMENTATION RATE] IN BLOOD BY WESTERGREN METHOD: 16 MM/H
EST. GFR  (NO RACE VARIABLE): 42.8 ML/MIN/1.73 M^2
FIO2: 40
GLUCOSE SERPL-MCNC: 197 MG/DL (ref 70–110)
GRAM STN SPEC: ABNORMAL
HCO3 UR-SCNC: 27.3 MMOL/L (ref 24–28)
HCT VFR BLD AUTO: 24.2 % (ref 40–54)
HGB BLD-MCNC: 7.6 G/DL (ref 14–18)
IMM GRANULOCYTES # BLD AUTO: ABNORMAL K/UL (ref 0–0.04)
IMM GRANULOCYTES NFR BLD AUTO: ABNORMAL % (ref 0–0.5)
LYMPHOCYTES NFR BLD: 3 % (ref 18–48)
MAGNESIUM SERPL-MCNC: 2.9 MG/DL (ref 1.6–2.6)
MCH RBC QN AUTO: 28.4 PG (ref 27–31)
MCHC RBC AUTO-ENTMCNC: 31.4 G/DL (ref 32–36)
MCV RBC AUTO: 90 FL (ref 82–98)
METAMYELOCYTES NFR BLD MANUAL: 2 %
MODE: ABNORMAL
MONOCYTES NFR BLD: 7 % (ref 4–15)
NEUTROPHILS NFR BLD: 81 % (ref 38–73)
NRBC BLD-RTO: 1 /100 WBC
OVALOCYTES BLD QL SMEAR: ABNORMAL
PCO2 BLDA: 43.6 MMHG (ref 35–45)
PEEP: 5
PH SMN: 7.41 [PH] (ref 7.35–7.45)
PHOSPHATE SERPL-MCNC: 3.7 MG/DL (ref 2.7–4.5)
PLATELET # BLD AUTO: 178 K/UL (ref 150–450)
PLATELET BLD QL SMEAR: ABNORMAL
PMV BLD AUTO: 10.9 FL (ref 9.2–12.9)
PO2 BLDA: 110 MMHG (ref 80–100)
POC BE: 3 MMOL/L
POC SATURATED O2: 98 % (ref 95–100)
POC TCO2: 29 MMOL/L (ref 23–27)
POCT GLUCOSE: 208 MG/DL (ref 70–110)
POCT GLUCOSE: 232 MG/DL (ref 70–110)
POCT GLUCOSE: 237 MG/DL (ref 70–110)
POCT GLUCOSE: 252 MG/DL (ref 70–110)
POCT GLUCOSE: 257 MG/DL (ref 70–110)
POCT GLUCOSE: 261 MG/DL (ref 70–110)
POIKILOCYTOSIS BLD QL SMEAR: SLIGHT
POLYCHROMASIA BLD QL SMEAR: ABNORMAL
POTASSIUM SERPL-SCNC: 4.9 MMOL/L (ref 3.5–5.1)
PROT SERPL-MCNC: 6.4 G/DL (ref 6–8.4)
RBC # BLD AUTO: 2.68 M/UL (ref 4.6–6.2)
SAMPLE: ABNORMAL
SITE: ABNORMAL
SODIUM SERPL-SCNC: 143 MMOL/L (ref 136–145)
SP02: 100
SPHEROCYTES BLD QL SMEAR: ABNORMAL
VANCOMYCIN TROUGH SERPL-MCNC: 16.7 UG/ML (ref 10–22)
VT: 500
WBC # BLD AUTO: 13.51 K/UL (ref 3.9–12.7)

## 2024-10-29 PROCEDURE — 94003 VENT MGMT INPAT SUBQ DAY: CPT

## 2024-10-29 PROCEDURE — 80202 ASSAY OF VANCOMYCIN: CPT | Performed by: PSYCHIATRY & NEUROLOGY

## 2024-10-29 PROCEDURE — 94761 N-INVAS EAR/PLS OXIMETRY MLT: CPT | Mod: XB

## 2024-10-29 PROCEDURE — 84100 ASSAY OF PHOSPHORUS: CPT

## 2024-10-29 PROCEDURE — 99233 SBSQ HOSP IP/OBS HIGH 50: CPT | Mod: ,,, | Performed by: PHYSICIAN ASSISTANT

## 2024-10-29 PROCEDURE — 95720 EEG PHY/QHP EA INCR W/VEEG: CPT | Mod: ,,, | Performed by: STUDENT IN AN ORGANIZED HEALTH CARE EDUCATION/TRAINING PROGRAM

## 2024-10-29 PROCEDURE — 25000003 PHARM REV CODE 250

## 2024-10-29 PROCEDURE — 80053 COMPREHEN METABOLIC PANEL: CPT

## 2024-10-29 PROCEDURE — 27100171 HC OXYGEN HIGH FLOW UP TO 24 HOURS

## 2024-10-29 PROCEDURE — 82803 BLOOD GASES ANY COMBINATION: CPT

## 2024-10-29 PROCEDURE — 99900026 HC AIRWAY MAINTENANCE (STAT)

## 2024-10-29 PROCEDURE — 36600 WITHDRAWAL OF ARTERIAL BLOOD: CPT

## 2024-10-29 PROCEDURE — 63600175 PHARM REV CODE 636 W HCPCS

## 2024-10-29 PROCEDURE — 99223 1ST HOSP IP/OBS HIGH 75: CPT | Mod: ,,, | Performed by: INTERNAL MEDICINE

## 2024-10-29 PROCEDURE — 25000003 PHARM REV CODE 250: Performed by: PSYCHIATRY & NEUROLOGY

## 2024-10-29 PROCEDURE — 83735 ASSAY OF MAGNESIUM: CPT

## 2024-10-29 PROCEDURE — 25000003 PHARM REV CODE 250: Performed by: NURSE PRACTITIONER

## 2024-10-29 PROCEDURE — 63600175 PHARM REV CODE 636 W HCPCS: Performed by: PSYCHIATRY & NEUROLOGY

## 2024-10-29 PROCEDURE — 25000003 PHARM REV CODE 250: Performed by: STUDENT IN AN ORGANIZED HEALTH CARE EDUCATION/TRAINING PROGRAM

## 2024-10-29 PROCEDURE — 85027 COMPLETE CBC AUTOMATED: CPT

## 2024-10-29 PROCEDURE — 95714 VEEG EA 12-26 HR UNMNTR: CPT

## 2024-10-29 PROCEDURE — 51702 INSERT TEMP BLADDER CATH: CPT

## 2024-10-29 PROCEDURE — 51701 INSERT BLADDER CATHETER: CPT

## 2024-10-29 PROCEDURE — 99900035 HC TECH TIME PER 15 MIN (STAT)

## 2024-10-29 PROCEDURE — 63600175 PHARM REV CODE 636 W HCPCS: Performed by: NURSE PRACTITIONER

## 2024-10-29 PROCEDURE — 20000000 HC ICU ROOM

## 2024-10-29 PROCEDURE — C9254 INJECTION, LACOSAMIDE: HCPCS

## 2024-10-29 PROCEDURE — 99291 CRITICAL CARE FIRST HOUR: CPT | Mod: GC,,, | Performed by: INTERNAL MEDICINE

## 2024-10-29 PROCEDURE — 85007 BL SMEAR W/DIFF WBC COUNT: CPT

## 2024-10-29 RX ORDER — INSULIN ASPART 100 [IU]/ML
12 INJECTION, SOLUTION INTRAVENOUS; SUBCUTANEOUS
Status: DISCONTINUED | OUTPATIENT
Start: 2024-10-29 | End: 2024-10-30

## 2024-10-29 RX ORDER — CLOBAZAM 10 MG/1
10 TABLET ORAL NIGHTLY
Status: DISCONTINUED | OUTPATIENT
Start: 2024-10-29 | End: 2024-10-30

## 2024-10-29 RX ADMIN — INSULIN ASPART 10 UNITS: 100 INJECTION, SOLUTION INTRAVENOUS; SUBCUTANEOUS at 11:10

## 2024-10-29 RX ADMIN — LEVETIRACETAM 1500 MG: 100 INJECTION INTRAVENOUS at 08:10

## 2024-10-29 RX ADMIN — HEPARIN SODIUM 5000 UNITS: 5000 INJECTION INTRAVENOUS; SUBCUTANEOUS at 10:10

## 2024-10-29 RX ADMIN — INSULIN ASPART 10 UNITS: 100 INJECTION, SOLUTION INTRAVENOUS; SUBCUTANEOUS at 12:10

## 2024-10-29 RX ADMIN — HEPARIN SODIUM 5000 UNITS: 5000 INJECTION INTRAVENOUS; SUBCUTANEOUS at 06:10

## 2024-10-29 RX ADMIN — INSULIN ASPART 12 UNITS: 100 INJECTION, SOLUTION INTRAVENOUS; SUBCUTANEOUS at 03:10

## 2024-10-29 RX ADMIN — INSULIN ASPART 3 UNITS: 100 INJECTION, SOLUTION INTRAVENOUS; SUBCUTANEOUS at 12:10

## 2024-10-29 RX ADMIN — INSULIN ASPART 12 UNITS: 100 INJECTION, SOLUTION INTRAVENOUS; SUBCUTANEOUS at 08:10

## 2024-10-29 RX ADMIN — SILODOSIN 4 MG: 4 CAPSULE ORAL at 08:10

## 2024-10-29 RX ADMIN — INSULIN ASPART 4 UNITS: 100 INJECTION, SOLUTION INTRAVENOUS; SUBCUTANEOUS at 11:10

## 2024-10-29 RX ADMIN — INSULIN GLARGINE 28 UNITS: 100 INJECTION, SOLUTION SUBCUTANEOUS at 08:10

## 2024-10-29 RX ADMIN — INSULIN ASPART 6 UNITS: 100 INJECTION, SOLUTION INTRAVENOUS; SUBCUTANEOUS at 07:10

## 2024-10-29 RX ADMIN — PIPERACILLIN SODIUM AND TAZOBACTAM SODIUM 4.5 G: 4; .5 INJECTION, POWDER, FOR SOLUTION INTRAVENOUS at 07:10

## 2024-10-29 RX ADMIN — INSULIN ASPART 10 UNITS: 100 INJECTION, SOLUTION INTRAVENOUS; SUBCUTANEOUS at 07:10

## 2024-10-29 RX ADMIN — PIPERACILLIN SODIUM AND TAZOBACTAM SODIUM 4.5 G: 4; .5 INJECTION, POWDER, FOR SOLUTION INTRAVENOUS at 10:10

## 2024-10-29 RX ADMIN — FAMOTIDINE 20 MG: 20 TABLET ORAL at 08:10

## 2024-10-29 RX ADMIN — PIPERACILLIN SODIUM AND TAZOBACTAM SODIUM 4.5 G: 4; .5 INJECTION, POWDER, FOR SOLUTION INTRAVENOUS at 02:10

## 2024-10-29 RX ADMIN — CLOBAZAM 10 MG: 10 TABLET ORAL at 08:10

## 2024-10-29 RX ADMIN — LACOSAMIDE 200 MG: 10 INJECTION INTRAVENOUS at 10:10

## 2024-10-29 RX ADMIN — INSULIN ASPART 10 UNITS: 100 INJECTION, SOLUTION INTRAVENOUS; SUBCUTANEOUS at 04:10

## 2024-10-29 RX ADMIN — HEPARIN SODIUM 5000 UNITS: 5000 INJECTION INTRAVENOUS; SUBCUTANEOUS at 02:10

## 2024-10-29 RX ADMIN — INSULIN ASPART 6 UNITS: 100 INJECTION, SOLUTION INTRAVENOUS; SUBCUTANEOUS at 04:10

## 2024-10-29 RX ADMIN — LEVETIRACETAM 1500 MG: 100 INJECTION INTRAVENOUS at 07:10

## 2024-10-29 RX ADMIN — ATORVASTATIN CALCIUM 40 MG: 40 TABLET, FILM COATED ORAL at 08:10

## 2024-10-29 RX ADMIN — VANCOMYCIN HYDROCHLORIDE 1250 MG: 1.25 INJECTION, POWDER, LYOPHILIZED, FOR SOLUTION INTRAVENOUS at 11:10

## 2024-10-29 RX ADMIN — INSULIN ASPART 4 UNITS: 100 INJECTION, SOLUTION INTRAVENOUS; SUBCUTANEOUS at 04:10

## 2024-10-29 RX ADMIN — INSULIN ASPART 2 UNITS: 100 INJECTION, SOLUTION INTRAVENOUS; SUBCUTANEOUS at 08:10

## 2024-10-29 NOTE — SUBJECTIVE & OBJECTIVE
Past Medical History:   Diagnosis Date    Allergy     Arthritis     CAD, multiple vessel     DR Melissa    Cataract     Depression     History of colon polyps     Hyperlipidemia     Hypertension     Macular degeneration     Dr Razo for injection, Hejulia for eye    S/P CABG x 2     Type 2 diabetes mellitus with circulatory disorder     Dr. Aquino       Past Surgical History:   Procedure Laterality Date    broken elbow      left    COLONOSCOPY N/A 10/4/2017    Procedure: COLONOSCOPY;  Surgeon: Gabriel Leung MD;  Location: [facility];  Service: Endoscopy;  Laterality: N/A;    CRANIOTOMY FOR EVACUATION OF SUBDURAL HEMATOMA Right 10/18/2024    Procedure: CRANIOTOMY, FOR SUBDURAL HEMATOMA EVACUATION;  Surgeon: Laurence Condon MD;  Location: [facility] OR [address] [place] [address];  Service: Neurosurgery;  Laterality: Right;    CRANIOTOMY FOR EVACUATION OF SUBDURAL HEMATOMA Right 10/18/2024    Procedure: CRANIOTOMY, Right supraorbital FOR HEMATOMA EVACUATION;  Surgeon: Laurence Condon MD;  Location: [facility] OR [address] [place] [address];  Service: Neurosurgery;  Laterality: Right;    EYE SURGERY      cataract    heart bypass      2004    HERNIA REPAIR      right    ROTATOR CUFF REPAIR      right  2004       Review of patient's allergies indicates:   Allergen Reactions    Aricept odt [donepezil] Diarrhea and Nausea And Vomiting    Codeine Nausea Only     weak    Ranexa [ranolazine]        Medications:  Medications Prior to Admission   Medication Sig    acetaminophen (TYLENOL) 500 MG tablet Take 1 tablet (500 mg total) by mouth every 6 (six) hours as needed.    albuterol (PROVENTIL) 2.5 mg /3 mL (0.083 %) nebulizer solution Take 3 mLs (2.5 mg total) by nebulization every 6 to 8 hours as needed for Wheezing.    albuterol (PROVENTIL/VENTOLIN HFA) 90 mcg/actuation inhaler INHALE 2 PUFFS BY MOUTH EVERY 6 HOURS AS NEEDED FOR WHEEZING OR  SHORTNESS  OF  BREATH    atorvastatin (LIPITOR) 40 MG tablet Take 1 tablet by mouth every morning.    BD INSULIN PEN  "NEEDLE UF SHORT 31 gauge x 5/16" Ndle     BD INSULIN SYRINGE ULTRA-FINE 1/2 mL 31 gauge x 15/64" Syrg     blood sugar diagnostic Strp To check BG 3 times daily, to use with insurance preferred meter    carvediloL (COREG) 3.125 MG tablet Take 1 tablet (3.125 mg total) by mouth 2 (two) times daily with meals.    ceramides 1,3,6-II (CERAVE DAILY MOISTURIZING) Lotn Apply twice daily to skin    cinnamon bark 500 mg capsule Take 1,000 mg by mouth once daily.      clopidogreL (PLAVIX) 75 mg tablet Take 75 mg by mouth.    diclofenac sodium (VOLTAREN) 1 % Gel Apply 2 g topically 3 (three) times daily.    DULoxetine (CYMBALTA) 60 MG capsule Take 1 capsule (60 mg total) by mouth once daily.    fluticasone propionate (FLONASE) 50 mcg/actuation nasal spray 1 spray (50 mcg total) by Each Nostril route 2 (two) times daily.    furosemide (LASIX) 40 MG tablet Take 40 mg by mouth once daily.    gabapentin (NEURONTIN) 300 MG capsule Take 4 capsules (1,200 mg total) by mouth 2 (two) times daily.    glimepiride (AMARYL) 4 MG tablet Take 4 mg by mouth daily with breakfast.     HYDROcodone-acetaminophen (NORCO) 7.5-325 mg per tablet Take 1 tablet by mouth every 8 (eight) hours as needed for Pain.    [START ON 11/3/2024] HYDROcodone-acetaminophen (NORCO) 7.5-325 mg per tablet Take 1 tablet by mouth every 8 (eight) hours as needed for Pain.    insulin NPH-insulin regular, 70/30, (NOVOLIN 70/30) 100 unit/mL (70-30) injection Inject into the skin. Inject 10 units at breakfast and inject 10 units at night    insulin syringe-needle U-100 0.3 mL 31 gauge x 15/64" Syrg USE TO INJECT INSULIN THREE TIMES DAILY    insulin syringe-needle U-100 1/2 mL 31 x 5/16" Syrg     isosorbide mononitrate (IMDUR) 30 MG 24 hr tablet Take 30 mg by mouth once daily.    lancets Misc To check BG 3 times daily, to use with insurance preferred meter    levETIRAcetam (KEPPRA) 500 MG Tab Take 1 tablet (500 mg total) by mouth 2 (two) times daily. for 7 days    LIDOcaine " "(LIDODERM) 5 % Place 1 patch onto the skin once daily. Remove & Discard patch within 12 hours or as directed by MD    meclizine (ANTIVERT) 12.5 mg tablet Take 1 tablet (12.5 mg total) by mouth 3 (three) times daily as needed for Dizziness.    methocarbamoL (ROBAXIN) 500 MG Tab TAKE 1 TABLET BY MOUTH 4 TIMES DAILY FOR 10 DAYS    mupirocin (BACTROBAN) 2 % ointment Apply topically 3 (three) times daily.    nitroGLYCERIN (NITROSTAT) 0.4 MG SL tablet DISSOLVE 1 TABLET UNDER THE TONGUE EVERY 5 MINUTES AS  NEEDED FOR CHEST PAIN. MAX  OF 3 TABLETS IN 15 MINUTES. CALL 911 IF PAIN PERSISTS.    omeprazole (PRILOSEC) 20 MG capsule Take 1 capsule (20 mg total) by mouth once daily.    pravastatin (PRAVACHOL) 20 MG tablet Take 1 tablet (20 mg total) by mouth once daily.    spironolactone (ALDACTONE) 25 MG tablet Take 0.5 tablets (12.5 mg total) by mouth once daily.    tiotropium-olodateroL (STIOLTO RESPIMAT) 2.5-2.5 mcg/actuation Mist Inhale 2 puffs into the lungs once daily. Controller    triamcinolone acetonide 0.1% (KENALOG) 0.1 % cream SMARTSI Application Topical 2-3 Times Daily    valsartan (DIOVAN) 40 MG tablet Take 1 tablet (40 mg total) by mouth once daily.    VIT C/VIT E AC/LUT/COPPER/ZINC (PRESERVISION LUTEIN ORAL) Take by mouth.    warfarin (COUMADIN) 5 MG tablet Take 2 tabs by mouth on Tuesday,Thursday, Saturday and Sundays then 1.5 on all other days.    [DISCONTINUED] atorvastatin (LIPITOR) 40 MG tablet Take 40 mg by mouth once daily.     Antibiotics (From admission, onward)      Start     Stop Route Frequency Ordered    10/28/24 1130  vancomycin 1,250 mg in D5W 250 mL IVPB (admixture device)         -- IV Every 24 hours (non-standard times) 10/27/24 1003    10/27/24 1100  piperacillin-tazobactam (ZOSYN) 4.5 g in D5W 100 mL IVPB (MB+)         -- IV Every 8 hours (non-standard times) 10/27/24 0956    10/27/24 1055  vancomycin - pharmacy to dose  (vancomycin IVPB (PEDS and ADULTS))        Placed in "And" Linked Group "    -- IV pharmacy to manage frequency 10/27/24 0955          Antifungals (From admission, onward)      None          Antivirals (From admission, onward)      None             Immunization History   Administered Date(s) Administered    COVID-19, MRNA, LN-S, PF (Pfizer) (Gray Cap) 07/26/2022    COVID-19, mRNA, LNP-S, PF, husam-sucrose, 30 mcg/0.3 mL (Pfizer Ages 12+) 10/02/2023, 09/25/2024    COVID-19, mRNA, LNP-S, bivalent booster, PF (PFIZER OMICRON) 10/06/2022    COVID-19, vector-nr, rS-Ad26, PF (Esther) 03/08/2021, 12/15/2021    Influenza - Quadrivalent - High Dose - PF (65 years and older) 09/23/2020, 10/13/2021, 10/06/2022    Influenza - Quadrivalent - PF *Preferred* (6 months and older) 10/02/2023    Influenza - Trivalent - Fluad - Adjuvanted - PF (65 years and older 10/14/2017, 09/25/2024    Influenza - Trivalent - Fluzone High Dose - PF (65 years and older) 11/06/2014, 10/01/2015, 10/26/2016, 09/24/2018, 09/13/2019    Influenza Split 11/23/2005, 10/21/2013    Pneumococcal Conjugate - 13 Valent 07/29/2016    Pneumococcal Conjugate - 20 Valent 07/11/2023    Pneumococcal Polysaccharide - 23 Valent 02/16/2015    RSVpreF (Arexvy) 04/03/2024    Tdap 05/23/2013, 07/11/2023    Zoster 07/27/2012    Zoster Recombinant 11/06/2020, 02/12/2021       Family History       Problem Relation (Age of Onset)    Arthritis Mother    Cancer Brother, Maternal Grandfather    Diabetes Mother, Maternal Aunt, Maternal Uncle, Paternal Aunt    Heart attack Father    Heart disease Maternal Uncle, Paternal Aunt, Paternal Uncle, Maternal Grandmother    Stroke Mother    Throat cancer Brother          Social History     Socioeconomic History    Marital status:     Number of children: 4    Years of education: GED   Occupational History    Occupation: retired tug    Tobacco Use    Smoking status: Former     Current packs/day: 0.00     Average packs/day: 3.0 packs/day for 45.0 years (135.0 ttl pk-yrs)     Types: Cigarettes      Start date: 1959     Quit date: 2004     Years since quittin.5    Smokeless tobacco: Former   Substance and Sexual Activity    Alcohol use: Yes     Comment: was heavy drinker 35 years ago, rare use now    Drug use: No    Sexual activity: Yes     Partners: Female     Social Drivers of Health     Financial Resource Strain: Low Risk  (10/22/2024)    Overall Financial Resource Strain (CARDIA)     Difficulty of Paying Living Expenses: Not very hard   Food Insecurity: No Food Insecurity (10/22/2024)    Hunger Vital Sign     Worried About Running Out of Food in the Last Year: Never true     Ran Out of Food in the Last Year: Never true   Transportation Needs: No Transportation Needs (10/22/2024)    TRANSPORTATION NEEDS     Transportation : No   Physical Activity: Inactive (10/18/2024)    Exercise Vital Sign     Days of Exercise per Week: 0 days     Minutes of Exercise per Session: 0 min   Stress: No Stress Concern Present (10/22/2024)    [employer] [place] [employer] - Occupational Stress Questionnaire     Feeling of Stress : Not at all   Housing Stability: Low Risk  (10/22/2024)    Housing Stability Vital Sign     Unable to Pay for Housing in the Last Year: No     Homeless in the Last Year: No     Review of Systems   Unable to perform ROS: Intubated     Objective:     Vital Signs (Most Recent):  Temp: 98.9 °F (37.2 °C) (10/29/24 0701)  Pulse: 94 (10/29/24 1113)  Resp: 19 (10/29/24 1113)  BP: 126/60 (10/29/24 0905)  SpO2: 100 % (10/29/24 1113) Vital Signs (24h Range):  Temp:  [94.6 °F (34.8 °C)-100.5 °F (38.1 °C)] 98.9 °F (37.2 °C)  Pulse:  [75-95] 94  Resp:  [16-21] 19  SpO2:  [97 %-100 %] 100 %  BP: (120-158)/(59-78) 126/60  Arterial Line BP: (129-153)/(54-63) 148/63     Weight: 83.5 kg (184 lb)  Body mass index is 27.98 kg/m².    Estimated Creatinine Clearance: 37.5 mL/min (A) (based on SCr of 1.6 mg/dL (H)).     Physical Exam  Vitals and nursing note reviewed.   Constitutional:        Appearance: He is ill-appearing.      Interventions: He is sedated and intubated.   HENT:      Mouth/Throat:      Comments: White plaque on surface of tongue beneath ET tube.  Cardiovascular:      Rate and Rhythm: Normal rate and regular rhythm.      Heart sounds: No murmur heard.  Pulmonary:      Effort: Pulmonary effort is normal. He is intubated.      Breath sounds: Normal breath sounds.   Abdominal:      General: Bowel sounds are normal. There is no distension.      Palpations: Abdomen is soft.      Tenderness: There is no abdominal tenderness.   Skin:     General: Skin is warm and dry.   Neurological:      Mental Status: He is lethargic.          Significant Labs: CBC:   Recent Labs   Lab 10/28/24  0029 10/28/24  0218 10/29/24  0258   WBC 15.34* 15.66* 13.51*   HGB 7.0* 7.1* 7.6*   HCT 23.5* 22.6* 24.2*    172 178     CMP:   Recent Labs   Lab 10/28/24  0029 10/29/24  0258    143   K 5.1 4.9    109   CO2 23 24   * 197*   BUN 41* 45*   CREATININE 1.4 1.6*   CALCIUM 8.4* 8.7   PROT 5.8* 6.4   ALBUMIN 1.8* 1.9*   BILITOT 0.4 0.4   ALKPHOS 92 101   AST 47* 42*   ALT 12 15   ANIONGAP 9 10     Microbiology Results (last 7 days)       Procedure Component Value Units Date/Time    Blood culture [[id number]] Collected: 10/28/24 1129    Order Status: Completed Specimen: Blood from Peripheral, Lower Leg, Left Updated: 10/29/24 1212     Blood Culture, Routine No Growth to date      No Growth to date    Blood culture [[id number]] Collected: 10/28/24 1137    Order Status: Completed Specimen: Blood from Peripheral, Lower Leg, Right Updated: 10/29/24 1212     Blood Culture, Routine No Growth to date      No Growth to date    Culture, Respiratory with Gram Stain [[id number]]  (Abnormal) Collected: 10/27/24 1008    Order Status: Completed Specimen: Respiratory from Endotracheal Aspirate Updated: 10/29/24 0933     Respiratory Culture No S aureus or Pseudomonas isolated.      ANASTASIIA  LUSITANIAE  Many  Normal respiratory pam also present       Gram Stain (Respiratory) <10 epithelial cells per low power field.     Gram Stain (Respiratory) Rare WBC's     Gram Stain (Respiratory) Rare yeast    Blood culture [[id number]] Collected: 10/18/24 0335    Order Status: Completed Specimen: Blood from Peripheral, Foot, Right Updated: 10/23/24 0612     Blood Culture, Routine No growth after 5 days.    Blood culture [[id number]] Collected: 10/18/24 0345    Order Status: Completed Specimen: Blood from Peripheral, Hand, Left Updated: 10/23/24 0612     Blood Culture, Routine No growth after 5 days.            Significant Imaging: I have reviewed all pertinent imaging results/findings within the past 24 hours.

## 2024-10-29 NOTE — ASSESSMENT & PLAN NOTE
I have reviewed hospital notes from  NCC service and other specialty providers. I have also reviewed CBC, CMP/BMP,  cultures and imaging with my interpretation as documented.     Leukocytosis likely a leukemoid reaction and multifactorial in nature in the setting of hemorrhage, intubation with mechanical ventilation, and recent surgical intervention.  Candida species are colonizer of the airway. Suspect this is colonization as this patient is not immunocompromised and does not require antifungal therapy at this time.   Consider stopping vancomycin in the absence of MRSA.  No further recommendations from ID standpoint.

## 2024-10-29 NOTE — ASSESSMENT & PLAN NOTE
2/2 R IPH s/p R supraorbital craniotomy for evacuation 10/18 and R craniotomy for SDH evacuation on 10/19  84M PMHx of HTN, HLD, DM2, CAD s/p CABG x2, Afib on Coumadin, ILD on home O2, HFrEF admitted to [facility] [place] as transfer from OSH For evaluation and management of large traumatic R frontal IPH now s/p R supraorbital craniotomy for hematoma evacuation 10/18 and R craniotomy for evacuation of post-op subdural hematoma 10/19. EEG initiated 10/21 showing right hemispheric subclinical seizures.    Recommendations:  - Continuous vEEG monitoring  - Recommend to decrease Clobazam to 10 mg qhs   - Recommend to continue Lacosamide 200 mg BID and Levetiracetam 1500 mg BID  - Propofol weaned off 10/28  - Avoid agents that lower seizure threshold  - Management of infectious/metabolic abnormalities per NCC  - Seizure precautions      Discussed plan of care with NCC team and daughter at bedside. Will follow, please call with questions.

## 2024-10-29 NOTE — ASSESSMENT & PLAN NOTE
LEANDRO on CKD, Cr 1.8 on initial presentation to ED    Recent Labs     10/27/24  0030 10/28/24  0029 10/29/24  0258   CREATININE 1.2 1.4 1.6*   EGFRNORACEVR >60.0 50.2* 42.8*        Plan  - LEANDRO is resolved - Cr increasing again, possibly due to vanc?  - Avoid nephrotoxins and renally dose meds for GFR listed above  - Monitor urine output, serial BMP, and adjust therapy as needed  - Q1H I&Os

## 2024-10-29 NOTE — ASSESSMENT & PLAN NOTE
Cr 1.8 on initial presentation to ED, increased to 2.6, LEANDRO now resolved    Creatine stable for now. BMP reviewed- noted Estimated Creatinine Clearance: 37.5 mL/min (A) (based on SCr of 1.6 mg/dL (H)). according to latest data. Based on current GFR, CKD stage is stage 3 - GFR 30-59.  Monitor UOP and serial BMP and adjust therapy as needed. Renally dose meds. Avoid nephrotoxic medications and procedures.

## 2024-10-29 NOTE — ASSESSMENT & PLAN NOTE
Initial presentation c/f HHS w glucose > 600. Beta hydroxybutyrate and urine ketones negative. Serum CO2 22. Given SQ insulin at OSH.  On arrival to [facility] [place], . Hb A1c 9.9% on admit.     Patient's FSGs are uncontrolled due to hyperglycemia on current medication regimen.  Last A1c reviewed-   Lab Results   Component Value Date    LABA1C 6.8 10/09/2017    HGBA1C 9.9 (H) 10/17/2024     Most recent fingerstick glucose reviewed-   Recent Labs   Lab 10/29/24  0017 10/29/24  0403 10/29/24  0753 10/29/24  1121   POCTGLUCOSE 257* 232* 252* 208*     Current correctional scale  Medium  Increase anti-hyperglycemic dose as follows-   Antihyperglycemics (From admission, onward)      Start     Stop Route Frequency Ordered    10/29/24 1600  insulin aspart U-100 pen 12 Units         -- SubQ 6 times per day 10/29/24 1535    10/28/24 0900  insulin glargine U-100 (Lantus) pen 28 Units         -- SubQ Daily 10/28/24 0853    10/25/24 1133  insulin aspart U-100 pen 0-10 Units         -- SubQ Every 4 hours PRN 10/25/24 1036    10/22/24 1400  insulin regular in 0.9 % NaCl 100 unit/100 mL (1 unit/mL) infusion        Question Answer Comment   Insulin Rate Adjustment (DO NOT MODIFY ANSWER) [email or web address]    Enter initial dose from Infusion Protocol Chart (Units/hr): 1.5        10/25/24 1431 IV Continuous 10/22/24 1252    10/21/24 1145  insulin regular in 0.9 % NaCl 100 unit/100 mL (1 unit/mL) infusion        Question:  Enter initial dose from Infusion Protocol Chart (Units/hr):  Answer:  1.65    10/21/24 1343 IV Continuous 10/21/24 1042          Hold Oral hypoglycemics while patient is in the hospital.

## 2024-10-29 NOTE — PROGRESS NOTES
[facility]  Neurology-Epilepsy  Progress Note    Patient Name: Percy Ramirez  MRN: [id number]  Admission Date: 10/17/2024  Hospital Length of Stay: 12 days  Code Status: Full Code   Attending Provider: Zheng Sykes MD  Primary Care Physician: Kasie Edmondson MD   Principal Problem:Focal seizures    Subjective:     Hospital Course:   10/21>10/22 EEG: Recurrent right hemispheric seizures with significant improvement noted in frequency after Propofol initiated 10/21 pm.  10/22>10/23 EEG: Highly epileptogenic right hemispheric structural lesion and severe diffuse encephalopathy, 11 subclinical seizures arising from the right parietal/temporal region   10/23>10/24 EEG: No further seizures after escalation of Propofol on 10/23, highly epileptogenic right hemispheric structural lesion and a severe diffuse encephalopathy  10/24>10/25 EEG: Highly epileptogenic right hemispheric structural lesion and a severe diffuse encephalopathy, one subclinical seizure at 19:14 on 10/24  OFF FOR MRI  10/26>10/27 EE electrographic right onset seizure after Propofol discontinued, burst suppression pattern with epileptiform bursts  10/27>10/28 EEG: suppressed, no electrographic seizures  10/28>10/29 EEG: remains suppressed, occasional right sharps, no electrographic seizures    See EEG reports for details.    Interval History: No seizures off Propofol. Clinically unchanged. Updated daughter at bedside, answered questions.    Current Facility-Administered Medications   Medication Dose Route Frequency Provider Last Rate Last Admin    acetaminophen tablet 650 mg  650 mg Per OG tube Q6H PRN Saman Gonzalez MD   650 mg at 10/28/24 1828    atorvastatin tablet 40 mg  40 mg Per OG tube Daily Christina Em, MICHAEL   40 mg at 10/29/24 0807    cloBAZam Tab 20 mg  20 mg Per OG tube QHS Dolores Moore DO        dextrose 10 % infusion   Intravenous Continuous PRN Dolores Moore DO        dextrose 10% bolus 125 mL 125 mL   12.5 g Intravenous PRN Dolores Moore DO        dextrose 10% bolus 250 mL 250 mL  25 g Intravenous PRN Dolores Moore DO        famotidine tablet 20 mg  20 mg Per OG tube Daily Saman Gonzalez MD   20 mg at 10/29/24 0807    fentaNYL injection 50 mcg  50 mcg Intravenous Q1H PRN Christina Hess NP   50 mcg at 10/27/24 1338    glucagon (human recombinant) injection 1 mg  1 mg Intramuscular PRN Dolores Moore DO        heparin (porcine) injection 5,000 Units  5,000 Units Subcutaneous Q8H Khris Cote MD   5,000 Units at 10/29/24 0628    hydrALAZINE injection 10 mg  10 mg Intravenous Q4H PRN Dolores Moore DO   10 mg at 10/26/24 1832    insulin aspart U-100 pen 0-10 Units  0-10 Units Subcutaneous Q4H PRN Dolores Moore DO   4 Units at 10/29/24 1123    insulin aspart U-100 pen 10 Units  10 Units Subcutaneous 6 times per day Khris Cote MD   10 Units at 10/29/24 1122    insulin glargine U-100 (Lantus) pen 28 Units  28 Units Subcutaneous Daily Dolores Moore DO   28 Units at 10/29/24 0800    labetalol 20 mg/4 mL (5 mg/mL) IV syring  10 mg Intravenous Q4H PRN Dolores Moore DO   10 mg at 10/26/24 2047    lacosamide injection 200 mg  200 mg Intravenous Q12H Dolores Moore DO   200 mg at 10/29/24 1048    levETIRAcetam injection 1,500 mg  1,500 mg Intravenous Q12H Khris Cote MD   1,500 mg at 10/29/24 0755    magnesium oxide split tablet 800 mg  800 mg Per OG tube PRN Radha Yan PA-C        magnesium oxide split tablet 800 mg  800 mg Per OG tube PRN Radha Yan PA-C        ondansetron injection 4 mg  4 mg Intravenous Q8H PRN Angel Nunez PA-C        piperacillin-tazobactam (ZOSYN) 4.5 g in D5W 100 mL IVPB (MB+)  4.5 g Intravenous Q8H Shalini Ruiz NP 25 mL/hr at 10/29/24 1052 4.5 g at 10/29/24 1052    potassium bicarbonate disintegrating tablet 35 mEq  35 mEq Per OG tube PRN Radha Yan PA-C        potassium bicarbonate disintegrating tablet 50 mEq  50 mEq Per OG tube PRN Yuriy,  Radha MILLER PA-C        potassium bicarbonate disintegrating tablet 60 mEq  60 mEq Per OG tube PRN Radha Yan PA-C        potassium, sodium phosphates 280-160-250 mg packet 2 packet  2 packet Per OG tube PRN Radha Yan PA-C        potassium, sodium phosphates 280-160-250 mg packet 2 packet  2 packet Per OG tube PRN Radha Yan PA-C   2 packet at 10/23/24 1220    potassium, sodium phosphates 280-160-250 mg packet 2 packet  2 packet Per OG tube PRN Radha Yan PA-C        silodosin capsule 4 mg  4 mg Per OG tube Daily Radha Yan PA-C   4 mg at 10/29/24 0807    vancomycin - pharmacy to dose   Intravenous pharmacy to manage frequency Shalini Ruiz NP        vancomycin 1,250 mg in D5W 250 mL IVPB (admixture device)  15 mg/kg Intravenous Q24H Nam Reynoso .7 mL/hr at 10/29/24 1111 1,250 mg at 10/29/24 1111     Continuous Infusions:   D10W   Intravenous Continuous PRN           Review of Systems  Objective:     Vital Signs (Most Recent):  Temp: 98.9 °F (37.2 °C) (10/29/24 0701)  Pulse: 94 (10/29/24 1113)  Resp: 19 (10/29/24 1113)  BP: 126/60 (10/29/24 0905)  SpO2: 100 % (10/29/24 1113) Vital Signs (24h Range):  Temp:  [94.6 °F (34.8 °C)-100.5 °F (38.1 °C)] 98.9 °F (37.2 °C)  Pulse:  [75-95] 94  Resp:  [16-21] 19  SpO2:  [97 %-100 %] 100 %  BP: (120-163)/(59-78) 126/60  Arterial Line BP: (129-157)/(54-71) 148/63     Weight: 83.5 kg (184 lb)  Body mass index is 27.98 kg/m².     Physical Exam  Constitutional:       General: He is not in acute distress.     Appearance: He is not diaphoretic.      Interventions: He is intubated.   HENT:      Head: Normocephalic.      Comments: EEG in place  Eyes:      General: No scleral icterus.        Right eye: No discharge.         Left eye: No discharge.      Conjunctiva/sclera: Conjunctivae normal.      Pupils: Pupils are equal, round, and reactive to light.   Cardiovascular:      Rate and Rhythm: Normal rate.   Pulmonary:      Effort:  Pulmonary effort is normal. No respiratory distress. He is intubated.      Comments: Intubated  Skin:     General: Skin is warm and dry.   Psychiatric:      Comments: Unable to assess            NEUROLOGICAL EXAMINATION:     CRANIAL NERVES     CN III, IV, VI   Pupils are equal, round, and reactive to light.       Comatose   AQUILINO OU   Corneals intact  No spontaneous eye opening   Face symmetric   Tongue midline   Minimal triple flexion to BLE    Exam findings to suggest seizures:  Myoclonus - no   eye twitching - no   Nystagmus - no   gaze deviation - no   waxy rigidity - no        Significant Labs: All pertinent lab results from the past 24 hours have been reviewed.    Significant Studies: I have reviewed all pertinent imaging results/findings within the past 24 hours.  Assessment and Plan:     * Focal seizures  2/2 R IPH s/p R supraorbital craniotomy for evacuation 10/18 and R craniotomy for SDH evacuation on 10/19  84M PMHx of HTN, HLD, DM2, CAD s/p CABG x2, Afib on Coumadin, ILD on home O2, HFrEF admitted to [facility] as transfer from OSH For evaluation and management of large traumatic R frontal IPH now s/p R supraorbital craniotomy for hematoma evacuation 10/18 and R craniotomy for evacuation of post-op subdural hematoma 10/19. EEG initiated 10/21 showing right hemispheric subclinical seizures.    Recommendations:  - Continuous vEEG monitoring  - Recommend to decrease Clobazam to 10 mg qhs   - Recommend to continue Lacosamide 200 mg BID and Levetiracetam 1500 mg BID  - Propofol weaned off 10/28  - Avoid agents that lower seizure threshold  - Management of infectious/metabolic abnormalities per NCC  - Seizure precautions      Discussed plan of care with NCC team and daughter at bedside. Will follow, please call with questions.    Traumatic right-sided intracerebral hemorrhage without loss of consciousness  SDH  ICH  Transfer from OSH for evaluation and management of large traumatic R frontal IPH now s/p R supraorbital  craniotomy for hematoma evacuation 10/18 and R craniotomy for evacuation of post-op subdural hematoma 10/19  - Drain removal 10/25 per NSGY  - Management per NSGY, [facility]    ILD (interstitial lung disease)  - Chronic, on home O2 as outpatient  - Currently intubated   - Vent management per [facility]    Persistent atrial fibrillation  - On Warfarin as outpatient, on hold in setting of acute IPH  - Management per [facility]    Major depressive disorder, recurrent episode, mild  - Chronic  - SSRI held per NCC  - Management per [facility]        VTE Risk Mitigation (From admission, onward)           Ordered     heparin (porcine) injection 5,000 Units  Every 8 hours         10/22/24 1121     IP VTE HIGH RISK PATIENT  Once         10/17/24 2326     Place sequential compression device  Until discontinued         10/17/24 2326     Reason for No Pharmacological VTE Prophylaxis  Once        Question:  Reasons:  Answer:  Active Bleeding    10/17/24 2326                    Brigitte Carrion PA-C  Neurology-Epilepsy  [facility]  Staff: Dr. Trammell

## 2024-10-29 NOTE — ASSESSMENT & PLAN NOTE
SDH  ICH  Transfer from OSH for evaluation and management of large traumatic R frontal IPH now s/p R supraorbital craniotomy for hematoma evacuation 10/18 and R craniotomy for evacuation of post-op subdural hematoma 10/19  - Drain removal 10/25 per NSGY  - Management per NSGY, NCC

## 2024-10-29 NOTE — SUBJECTIVE & OBJECTIVE
Interval History:      No seizures overnight. Resp cultures growing Candida lusitaniae. Bloody secretions noted. ID consulted - no treatment necessary as this can be common colonizer. Onfi decreased to 10mg QHS. Cr increasing, possibly due to vancomycin. Family to be present tomorrow for total update.     Objective:     Vitals:  Temp: 99 °F (37.2 °C)  Pulse: 87  Rhythm: atrial rhythm  BP: 123/69  MAP (mmHg): 89  Resp: 16  SpO2: 100 %  Oxygen Concentration (%): 40  Vent Mode: A/C  Set Rate: 16 BPM  Vt Set: 500 mL  PEEP/CPAP: 5 cmH20  Peak Airway Pressure: 36 cmH20  Mean Airway Pressure: 13 cmH20  Plateau Pressure: 13 cmH20    Temp  Min: 94.6 °F (34.8 °C)  Max: 100.5 °F (38.1 °C)  Pulse  Min: 75  Max: 96  BP  Min: 120/59  Max: 158/73  MAP (mmHg)  Min: 84  Max: 109  Resp  Min: 16  Max: 28  SpO2  Min: 97 %  Max: 100 %  Oxygen Concentration (%)  Min: 40  Max: 40    10/28 0701 - 10/29 0700  In: 1590 [I.V.:20.5]  Out: 1250 [Urine:1250]   Unmeasured Output  Urine Occurrence: 1  Stool Occurrence: 1  Pad Count: 1        Physical Exam  Constitutional:       Appearance: He is not toxic-appearing.      Interventions: He is intubated.   Cardiovascular:      Rate and Rhythm: Normal rate.      Comments: Afib on monitor  Pulmonary:      Effort: No respiratory distress. He is intubated.      Comments: Coarse breath sounds on R  Abdominal:      General: There is no distension.      Palpations: Abdomen is soft.      Tenderness: There is no abdominal tenderness.   Musculoskeletal:         General: Swelling (LUE) present.   Skin:     General: Skin is warm and dry.   Neurological:      Comments: Unarousable          Medications:  [id number]    Scheduledatorvastatin, 40 mg, Daily  cloBAZam, 10 mg, QHS  famotidine, 20 mg, Daily  heparin (porcine), 5,000 Units, Q8H  insulin aspart U-100, 10 Units, 6 times per day  insulin glargine U-100, 28 Units, Daily  lacosamide (Vimpat) IV orderable, 200 mg, Q12H  levETIRAcetam (Keppra) IV (PEDS and  ADULTS), 1,500 mg, Q12H  piperacillin-tazobactam (Zosyn) IV (PEDS and ADULTS) (extended infusion is not appropriate), 4.5 g, Q8H  silodosin, 4 mg, Daily    PRNacetaminophen, 650 mg, Q6H PRN  D10W, , Continuous PRN  dextrose 10%, 12.5 g, PRN  dextrose 10%, 25 g, PRN  fentaNYL, 50 mcg, Q1H PRN  glucagon (human recombinant), 1 mg, PRN  hydrALAZINE, 10 mg, Q4H PRN  insulin aspart U-100, 0-10 Units, Q4H PRN  labetalol, 10 mg, Q4H PRN  magnesium oxide, 800 mg, PRN  magnesium oxide, 800 mg, PRN  ondansetron, 4 mg, Q8H PRN  potassium bicarbonate, 35 mEq, PRN  potassium bicarbonate, 50 mEq, PRN  potassium bicarbonate, 60 mEq, PRN  potassium, sodium phosphates, 2 packet, PRN  potassium, sodium phosphates, 2 packet, PRN  potassium, sodium phosphates, 2 packet, PRN  vancomycin - pharmacy to dose, , pharmacy to manage frequency      Today I personally reviewed pertinent medications, imaging, laboratory results, notably:    Diet  Diet NPO Except for: Medication  Diet NPO Except for: Medication

## 2024-10-29 NOTE — HPI
"An 83-year-old man with HFrEF-45%, CAD-CABG, HTN, Afib on Coumadin, DM2, ILD on home oxygen, and arthritis who was initially seen at [facility] ED after a fall which resulted in a subdural hematoma. He was discharged from the ED however returned for evaluation after a second fall and lethargy on 10/17. He was noted to be febrile to 101 F and hypertensive to 150/90 mmHg. Imaging revealed a right frontal intraparenchymal hemorrhage. He was transferred to [facility] unit for higher level of care. Upon transfer, he was taken to the OR for craniotomy with evacuation of a subdural hematoma and placement of a subdural drain. He has remained intubated post procedure. Due to ongoing leukocytosis, sputum cultures were performed on 10/27 and which are now positive for C lusitaniae.     Of note, he received cefazolin for perioperative prophylaxis and has been on Zosyn plus vancomycin since 10/27.    Infectious Diseases consulted for "Sputum cultures growing Candida lusitaniae"      "

## 2024-10-29 NOTE — SUBJECTIVE & OBJECTIVE
Interval History: No seizures off Propofol. Clinically unchanged. Updated daughter at bedside, answered questions.    Current Facility-Administered Medications   Medication Dose Route Frequency Provider Last Rate Last Admin    acetaminophen tablet 650 mg  650 mg Per OG tube Q6H PRN Saman Gonzalez MD   650 mg at 10/28/24 1828    atorvastatin tablet 40 mg  40 mg Per OG tube Daily Christina Em NP   40 mg at 10/29/24 0807    cloBAZam Tab 20 mg  20 mg Per OG tube QHS Dolores Moore DO        dextrose 10 % infusion   Intravenous Continuous PRN Dolores Moore DO        dextrose 10% bolus 125 mL 125 mL  12.5 g Intravenous PRN Dolores Moore DO        dextrose 10% bolus 250 mL 250 mL  25 g Intravenous PRN Dolores Moore DO        famotidine tablet 20 mg  20 mg Per OG tube Daily Saman Gonzalez MD   20 mg at 10/29/24 0807    fentaNYL injection 50 mcg  50 mcg Intravenous Q1H PRN Christina Hess NP   50 mcg at 10/27/24 1338    glucagon (human recombinant) injection 1 mg  1 mg Intramuscular PRN Dolores Moore DO        heparin (porcine) injection 5,000 Units  5,000 Units Subcutaneous Q8H Khris Cote MD   5,000 Units at 10/29/24 0628    hydrALAZINE injection 10 mg  10 mg Intravenous Q4H PRN Dolores Moore DO   10 mg at 10/26/24 1832    insulin aspart U-100 pen 0-10 Units  0-10 Units Subcutaneous Q4H PRN Dolores Moore DO   4 Units at 10/29/24 1123    insulin aspart U-100 pen 10 Units  10 Units Subcutaneous 6 times per day Khris Coet MD   10 Units at 10/29/24 1122    insulin glargine U-100 (Lantus) pen 28 Units  28 Units Subcutaneous Daily Dolores Moore DO   28 Units at 10/29/24 0800    labetalol 20 mg/4 mL (5 mg/mL) IV syring  10 mg Intravenous Q4H PRN Dolores Moore DO   10 mg at 10/26/24 2047    lacosamide injection 200 mg  200 mg Intravenous Q12H Dolores Moore DO   200 mg at 10/29/24 1048    levETIRAcetam injection 1,500 mg  1,500 mg Intravenous Q12H Khris Cote MD   1,500 mg at 10/29/24 4346     magnesium oxide split tablet 800 mg  800 mg Per OG tube PRN Radha Yan PA-C        magnesium oxide split tablet 800 mg  800 mg Per OG tube PRN Radha Yan PA-C        ondansetron injection 4 mg  4 mg Intravenous Q8H PRN Angel Nunez PA-C        piperacillin-tazobactam (ZOSYN) 4.5 g in D5W 100 mL IVPB (MB+)  4.5 g Intravenous Q8H Shalini Ruiz NP 25 mL/hr at 10/29/24 1052 4.5 g at 10/29/24 1052    potassium bicarbonate disintegrating tablet 35 mEq  35 mEq Per OG tube PRN Radha Yan PA-C        potassium bicarbonate disintegrating tablet 50 mEq  50 mEq Per OG tube PRN Radha Yan PA-C        potassium bicarbonate disintegrating tablet 60 mEq  60 mEq Per OG tube PRN Radha Yan PA-C        potassium, sodium phosphates 280-160-250 mg packet 2 packet  2 packet Per OG tube PRN Radha Yan PA-C        potassium, sodium phosphates 280-160-250 mg packet 2 packet  2 packet Per OG tube PRN Radha Yan PA-C   2 packet at 10/23/24 1220    potassium, sodium phosphates 280-160-250 mg packet 2 packet  2 packet Per OG tube PRN Radha Yan PA-C        silodosin capsule 4 mg  4 mg Per OG tube Daily Radha Yan PA-C   4 mg at 10/29/24 0807    vancomycin - pharmacy to dose   Intravenous pharmacy to manage frequency Shalini Ruiz NP        vancomycin 1,250 mg in D5W 250 mL IVPB (admixture device)  15 mg/kg Intravenous Q24H Nam Reynoso .7 mL/hr at 10/29/24 1111 1,250 mg at 10/29/24 1111     Continuous Infusions:   D10W   Intravenous Continuous PRN           Review of Systems  Objective:     Vital Signs (Most Recent):  Temp: 98.9 °F (37.2 °C) (10/29/24 0701)  Pulse: 94 (10/29/24 1113)  Resp: 19 (10/29/24 1113)  BP: 126/60 (10/29/24 0905)  SpO2: 100 % (10/29/24 1113) Vital Signs (24h Range):  Temp:  [94.6 °F (34.8 °C)-100.5 °F (38.1 °C)] 98.9 °F (37.2 °C)  Pulse:  [75-95] 94  Resp:  [16-21] 19  SpO2:  [97 %-100 %] 100 %  BP: (120-163)/(59-78) 126/60  Arterial  Line BP: (129-157)/(54-71) 148/63     Weight: 83.5 kg (184 lb)  Body mass index is 27.98 kg/m².     Physical Exam  Constitutional:       General: He is not in acute distress.     Appearance: He is not diaphoretic.      Interventions: He is intubated.   HENT:      Head: Normocephalic.      Comments: EEG in place  Eyes:      General: No scleral icterus.        Right eye: No discharge.         Left eye: No discharge.      Conjunctiva/sclera: Conjunctivae normal.      Pupils: Pupils are equal, round, and reactive to light.   Cardiovascular:      Rate and Rhythm: Normal rate.   Pulmonary:      Effort: Pulmonary effort is normal. No respiratory distress. He is intubated.      Comments: Intubated  Skin:     General: Skin is warm and dry.   Psychiatric:      Comments: Unable to assess            NEUROLOGICAL EXAMINATION:     CRANIAL NERVES     CN III, IV, VI   Pupils are equal, round, and reactive to light.       Comatose   AQUILINO OU   Corneals intact  No spontaneous eye opening   Face symmetric   Tongue midline   Minimal triple flexion to BLE    Exam findings to suggest seizures:  Myoclonus - no   eye twitching - no   Nystagmus - no   gaze deviation - no   waxy rigidity - no        Significant Labs: All pertinent lab results from the past 24 hours have been reviewed.    Significant Studies: I have reviewed all pertinent imaging results/findings within the past 24 hours.

## 2024-10-29 NOTE — ASSESSMENT & PLAN NOTE
EEG placed morning of 10/21 due to delay in return to expected LOC  -Multiple seizures throughout the day  -Has been loaded with keppra and vimpat  -Addl seizure evening of 10/24, Onfi added on.   -Current AED regimen:   - Keppra 1500 BID   - Vimpat 200 BID   - Onfi 10 QHS  -No seizures 10/29

## 2024-10-29 NOTE — PROGRESS NOTES
Therapy with Vancomycin is complete and/or consult discontinued by provider.  Pharmacy will sign off, please re-consult as needed.     Thank you  Naima Stevens D

## 2024-10-29 NOTE — PLAN OF CARE
"[facility] Care Plan  POC reviewed with Percy WHIT Ramirez and family at 1400. Family verbalized understanding. Questions and concerns addressed. No acute events today. Pt progressing toward goals. Will continue to monitor. See below and flowsheets for full assessment and VS info.             Is this a stroke patient? yes- Stroke booklet reviewed with family, risk factors identified for patient and stroke booklet remains at bedside for ongoing education.     Care individualization: Pt likes lights dimmed.    Neuro:  [place] Coma Scale  Best Eye Response: 1-->(E1) none  Best Motor Response: 3-->(M3) flexion to pain  Best Verbal Response: 1-->(V1) none  [place] Coma Scale Score: 5  Assessment Qualifiers: patient intubated, no eye obstruction present  Pupil PERRLA: yes     24 hr Temp:  [94.6 °F (34.8 °C)-100.2 °F (37.9 °C)]     CV:   Rhythm: atrial rhythm  BP goals:   SBP < 160  MAP > 65    Resp:      Vent Mode: A/C  Set Rate: 16 BPM  Oxygen Concentration (%): 40  Vt Set: 500 mL  PEEP/CPAP: 5 cmH20  Pressure Support: 5 cmH20    Plan: trach/PEG discussions    GI/:     Diet/Nutrition Received: tube feeding  Last Bowel Movement: 10/28/24  Voiding Characteristics: urethral catheter (bladder)    Intake/Output Summary (Last 24 hours) at 10/29/2024 1619  Last data filed at 10/29/2024 1605  Gross per 24 hour   Intake 1747.61 ml   Output 1450 ml   Net 297.61 ml     Unmeasured Output  Urine Occurrence: 1  Stool Occurrence: 1  Pad Count: 1    Labs/Accuchecks:  Recent Labs   Lab 10/29/24  0258   WBC 13.51*   RBC 2.68*   HGB 7.6*   HCT 24.2*         Recent Labs   Lab 10/29/24  0258      K 4.9   CO2 24      BUN 45*   CREATININE 1.6*   ALKPHOS 101   ALT 15   AST 42*   BILITOT 0.4    No results for input(s): "PROTIME", "INR", "APTT", "HEPANTIXA" in the last 168 hours. No results for input(s): "CPK", "CPKMB", "TROPONINI", "MB" in the last 168 hours.    Electrolytes: N/A - electrolytes WDL  Accuchecks: Q4H    Gtts:   D10W   " Intravenous Continuous PRN           LDA/Wounds:    Mykel Risk Assessment  Sensory Perception: 2-->very limited  Moisture: 3-->occasionally moist  Activity: 1-->bedfast  Mobility: 2-->very limited  Nutrition: 3-->adequate  Friction and Shear: 2-->potential problem  Mykel Score: 13    Is your mykel score 12 or less? no      Nurses Note -- 4 Eyes    Is there altered skin present?  No  Second RN/Staff Member:  RADHA Solorio      Restraints:   Restraint Order  Length of Order: Order good for next 24 hours or when removed.  Date that the current order will : 10/27/24  Time that the current order will : 1623  Order Upon Application: Yes    [facility]

## 2024-10-29 NOTE — PROCEDURES
ICU EEG/VIDEO MONITORING REPORT    DATE OF SERVICE: 10/28/24-10/29/24  EEG NUMBER: FH -2  [facility] SERVICE[facility]    METHODOLOGY   Electroencephalographic (EEG) recording is with electrodes placed according to the International 10-20 placement system.  Thirty two (32) channels of digital signal are simultaneously recorded from the scalp and may include EKG, EMG, and/or eye monitors.   Recording band pass was 0.1 to 512 hz.  Digital video recording of the patient is simultaneously recorded with the EEG.  The nursing staff report clinical symptoms and may press an event button when the patient has symptoms of clinical interest to the treating physicians.  EEG and video recording is stored and archived in digital format.  The entire recording is visually reviewed and the times identified by computer analysis as being spikes or seizures are reviewed again.  Activation procedures which include photic stimulation, hyperventilation and instructing patients to perform simple task are done in selected patients.   Compresses spectral analysis (CSA) is also performed on the activity recorded from each individual channel.  This is displayed as a power display of frequencies from 0 to 30 Hz over time.   The CSA analysis is done and displayed continuously.  This is reviewed for asymmetries in power between homologous areas of the scalp and for presence of changes in power which canbe seen when seizures occur.  Sections of suspected abnormalities on the CSA is then compared with the original EEG recording.     [employer] software was also utilized in the review of this study.  This software suite analyzes the EEG recording in multiple domains.  Coherence and rhythmicity is computed to identify EEG sections which may contain organized seizures.  Each channel undergoes analysis to detect presence of spike and sharp waves which have special and morphological characteristic of epileptic activity.  The routine EEG recording  is converted from spacial into frequency domain.  This is then displayed comparing homologous areas to identify areas of significant asymmetry.  Algorithm to identify non-cortically generated artifact is used to separate eye movement, EMG and other artifact from the EEG.      Recording Times  Start on 10/28/24 at 07:00  Stop on 10/29/24 at 07:00  24 hours EEG recorded    This EEG study is performed in the ICU with the patient on the following sedative medications: propofol 10 mKm -> off    Indication: 83 y.o. 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     State of Consciousness:   Coma    Background:   The background is burst suppressed, with and bursts consisting of sharply contoured theta/delta activity.  Periods of suppression last up to 7 seconds at start of record, with cessation of propofol they decrease to 5 seconds.  The left hemisphere is more suppressed than the right side.    Sleep:   The patient is in a comatose state throughout the record, with no normal sleep architecture appreciated    Epileptiform Abnormalities  Occasional right hemispheric sharps are noted (Fp2, P4/T6).      EKG:   Irregular rhythm    Seizures/Events:   None    Impression:   Abnormal study demonstrating a burst suppression pattern secondary to anesthetic medications as well as intermittent epileptiform activity in the right hemisphere consistent with a region of cortical irritability and seizure potential.  No seizures are recorded.       Mayra Trammell MD  [facility]   Department of Neurology/Epilepsy

## 2024-10-29 NOTE — PROGRESS NOTES
Pharmacokinetic Assessment Follow Up: IV Vancomycin    Vancomycin serum concentration assessment/plan:  Trough resulted as 16.7 mcg/mL; Goal 10-20 mcg/mL, Pneumonia  Patient meets criteria for LEANDRO. Creatinine increased from 1.2 mg/dL to 1.6 mg/dL in 48 hrs. Urine output 0.6 mL/kg/hr for past 24 hrs.   Will discontinue scheduled vancomycin regimen and dose vanc by level in the setting of LEANDRO   Re-dose Vancomycin 1250 mg IV x 1; continue to dose by level when the random is less than 20 mcg/mL  Next level to be drawn on 10/30 at 1100 (24 hrs after dose)     Drug levels (last 3 results):  Recent Labs   Lab Result Units 10/29/24  1107   Vancomycin-Trough ug/mL 16.7       Pharmacy will continue to follow and monitor vancomycin.    Please contact pharmacy at extension [phone] for questions regarding this assessment.    Thank you for the consult,   Meghan Cole       Patient brief summary:  Percy Ramirez is a 84 y.o. male initiated on antimicrobial therapy with IV Vancomycin for treatment of lower respiratory infection    The patient's current regimen is pulse dosing in the setting of LEANDRO     Drug Allergies:   Review of patient's allergies indicates:   Allergen Reactions    Aricept odt [donepezil] Diarrhea and Nausea And Vomiting    Codeine Nausea Only     weak    Ranexa [ranolazine]        Actual Body Weight:   83.5 kg     Renal Function:   Estimated Creatinine Clearance: 37.5 mL/min (A) (based on SCr of 1.6 mg/dL (H)).,     Dialysis Method (if applicable):  N/A    CBC (last 72 hours):  Recent Labs   Lab Result Units 10/27/24  0030 10/28/24  0029 10/28/24  0218 10/29/24  0258   WBC K/uL 17.84* 15.34* 15.66* 13.51*   Hemoglobin g/dL 7.8* 7.0* 7.1* 7.6*   Hematocrit % 24.8* 23.5* 22.6* 24.2*   Platelets K/uL 175 165 172 178   Gran % % 85.0* 83.0* 86.0* 81.0*   Lymph % % 2.0* 2.0* 5.0* 3.0*   Mono % % 6.0 5.0 5.0 7.0   Eosinophil % % 4.0 1.0 0.0 7.0   Basophil % % 0.0 0.0 0.0 0.0   Differential Method  Manual Manual  Manual Manual       Metabolic Panel (last 72 hours):  Recent Labs   Lab Result Units 10/27/24  0030 10/28/24  0029 10/28/24  1226 10/29/24  0258   Sodium mmol/L 143 141  --  143   Potassium mmol/L 5.2* 5.1  --  4.9   Chloride mmol/L 111* 109  --  109   CO2 mmol/L 22* 23  --  24   Glucose mg/dL 232* 236*  --  197*   Glucose, UA   --   --  Negative  --    BUN mg/dL 40* 41*  --  45*   Creatinine mg/dL 1.2 1.4  --  1.6*   Albumin g/dL 1.9* 1.8*  --  1.9*   Total Bilirubin mg/dL 0.4 0.4  --  0.4   Alkaline Phosphatase U/L 89 92  --  101   AST U/L 38 47*  --  42*   ALT U/L 7* 12  --  15   Magnesium mg/dL 2.9* 2.8*  --  2.9*   Phosphorus mg/dL 3.3 3.1  --  3.7       Vancomycin Administrations:  vancomycin given in the last 96 hours                     vancomycin 1,250 mg in D5W 250 mL IVPB (admixture device) (mg) 1,250 mg New Bag 10/29/24 1111     1,250 mg New Bag 10/28/24 1218    vancomycin 1.75 g in 5 % dextrose 500 mL IVPB (mg) 1,750 mg New Bag 10/27/24 1026                    Microbiologic Results:  Microbiology Results (last 7 days)       Procedure Component Value Units Date/Time    Blood culture [[id number]] Collected: 10/28/24 1129    Order Status: Completed Specimen: Blood from Peripheral, Lower Leg, Left Updated: 10/29/24 1212     Blood Culture, Routine No Growth to date      No Growth to date    Blood culture [[id number]] Collected: 10/28/24 1137    Order Status: Completed Specimen: Blood from Peripheral, Lower Leg, Right Updated: 10/29/24 1212     Blood Culture, Routine No Growth to date      No Growth to date    Culture, Respiratory with Gram Stain [[id number]]  (Abnormal) Collected: 10/27/24 1008    Order Status: Completed Specimen: Respiratory from Endotracheal Aspirate Updated: 10/29/24 0933     Respiratory Culture No S aureus or Pseudomonas isolated.      ANASTASIIA LUSITANIAE  Many  Normal respiratory pam also present       Gram Stain (Respiratory) <10 epithelial cells per low power field.     Gram Stain  (Respiratory) Rare WBC's     Gram Stain (Respiratory) Rare yeast    Blood culture [[id number]] Collected: 10/18/24 0335    Order Status: Completed Specimen: Blood from Peripheral, Foot, Right Updated: 10/23/24 0612     Blood Culture, Routine No growth after 5 days.    Blood culture [[id number]] Collected: 10/18/24 0345    Order Status: Completed Specimen: Blood from Peripheral, Hand, Left Updated: 10/23/24 0612     Blood Culture, Routine No growth after 5 days.

## 2024-10-29 NOTE — PLAN OF CARE
"[facility] Care Plan    POC reviewed with Percy Ramirez and family at 0300. Patient verbalized understanding. Questions and concerns addressed. No acute events today. Pt progressing toward goals. Will continue to monitor. See below and flowsheets for full assessment and VS info.       Is this a stroke patient? yes- Stroke booklet reviewed with patient, risk factors identified for patient and stroke booklet remains at bedside for ongoing education.     Care individualization: Lightweight blankets, fans, television    Neuro:  [place] Coma Scale  Best Eye Response: 1-->(E1) none  Best Motor Response: 3-->(M3) flexion to pain  Best Verbal Response: 1-->(V1) none  [place] Coma Scale Score: 5  Assessment Qualifiers: patient intubated, no eye obstruction present  Pupil PERRLA: yes     24 hr Temp:  [77 °F (25 °C)-100.5 °F (38.1 °C)]     CV:   Rhythm: atrial rhythm  BP goals:   SBP < 160  MAP > 65    Resp:      Vent Mode: A/C  Set Rate: 16 BPM  Oxygen Concentration (%): 40  Vt Set: 500 mL  PEEP/CPAP: 5 cmH20  Pressure Support: 5 cmH20    Plan: wean to extubate    GI/:     Diet/Nutrition Received: tube feeding  Last Bowel Movement: 10/28/24  Voiding Characteristics: unable to void    Intake/Output Summary (Last 24 hours) at 10/29/2024 0642  Last data filed at 10/29/2024 0502  Gross per 24 hour   Intake 1589.99 ml   Output 1250 ml   Net 339.99 ml     Unmeasured Output  Urine Occurrence: 1  Stool Occurrence: 1  Pad Count: 1    Labs/Accuchecks:  Recent Labs   Lab 10/29/24  0258   WBC 13.51*   RBC 2.68*   HGB 7.6*   HCT 24.2*         Recent Labs   Lab 10/29/24  0258      K 4.9   CO2 24      BUN 45*   CREATININE 1.6*   ALKPHOS 101   ALT 15   AST 42*   BILITOT 0.4    No results for input(s): "PROTIME", "INR", "APTT", "HEPANTIXA" in the last 168 hours. No results for input(s): "CPK", "CPKMB", "TROPONINI", "MB" in the last 168 hours.    Electrolytes: N/A - electrolytes WDL  Accuchecks: Q4H    Gtts:   D10W   " Intravenous Continuous PRN           LDA/Wounds:    Mykel Risk Assessment  Sensory Perception: 2-->very limited  Moisture: 3-->occasionally moist  Activity: 1-->bedfast  Mobility: 2-->very limited  Nutrition: 3-->adequate  Friction and Shear: 2-->potential problem  Mykel Score: 13  Is your mykel score 12 or less? no    Nurses Note -- 4 Eyes    Is there altered skin present?  No  Second RN/Staff Member:  RADHA Obregon      Restraints: N/A  WCTM       Problem: Fall Injury Risk  Goal: Absence of Fall and Fall-Related Injury  Outcome: Not Progressing     Problem: Infection  Goal: Absence of Infection Signs and Symptoms  Outcome: Not Progressing     Problem: Adult Inpatient Plan of Care  Goal: Plan of Care Review  Outcome: Not Progressing  Goal: Patient-Specific Goal (Individualized)  Outcome: Not Progressing  Goal: Absence of Hospital-Acquired Illness or Injury  Outcome: Not Progressing  Goal: Optimal Comfort and Wellbeing  Outcome: Not Progressing  Goal: Readiness for Transition of Care  Outcome: Not Progressing

## 2024-10-29 NOTE — CONSULTS
[facility]  Infectious Disease  Consult Note    Patient Name: Percy Ramirez  MRN: [id number]  Admission Date: 10/17/2024  Hospital Length of Stay: 12 days  Attending Physician: Zheng Sykes MD  Primary Care Provider: Kasie Edmondson MD     Isolation Status: No active isolations    Patient information was obtained from past medical records and ER records.      Inpatient consult to Infectious Diseases  Consult performed by: Christy Feng MD  Consult ordered by: Dolores Moore DO        Assessment/Plan:     Oncology  Leukocytosis  I have reviewed hospital notes from  NCC service and other specialty providers. I have also reviewed CBC, CMP/BMP,  cultures and imaging with my interpretation as documented.     Leukocytosis likely a leukemoid reaction and multifactorial in nature in the setting of hemorrhage, intubation with mechanical ventilation, and recent surgical intervention.  Candida species are colonizer of the airway. Suspect this is colonization as this patient is not immunocompromised and does not require antifungal therapy at this time.   Consider stopping vancomycin in the absence of MRSA.  No further recommendations from ID standpoint.         Thank you for your consult. I will sign off. Please contact us if you have any additional questions.    Christy Diego MD  Infectious Disease  [facility] - Neuro [facility]    75 minutes of total time spent on the encounter, which includes face to face time and non-face to face time preparing to see the patient (eg, review of tests), obtaining and/or reviewing separately obtained history, documenting clinical information in the electronic or other health record, independently interpreting results (not separately reported) and communicating results to the patient/family/caregiver, or care coordination (not separately reported).     Subjective:     Principal Problem: Traumatic right-sided intracerebral hemorrhage without loss of  "consciousness    HPI: An 83-year-old man with HFrEF-45%, CAD-CABG, HTN, Afib on Coumadin, DM2, ILD on home oxygen, and arthritis who was initially seen at [facility] ED after a fall which resulted in a subdural hematoma. He was discharged from the ED however returned for evaluation after a second fall and lethargy on 10/17. He was noted to be febrile to 101 F and hypertensive to 150/90 mmHg. Imaging revealed a right frontal intraparenchymal hemorrhage. He was transferred to [facility]CC unit for higher level of care. Upon transfer, he was taken to the OR for craniotomy with evacuation of a subdural hematoma and placement of a subdural drain. He has remained intubated post procedure. Due to ongoing leukocytosis, sputum cultures were performed on 10/27 and which are now positive for C lusitaniae.     Of note, he received cefazolin for perioperative prophylaxis and has been on Zosyn plus vancomycin since 10/27.    Infectious Diseases consulted for "Sputum cultures growing Candida lusitaniae"        Past Medical History:   Diagnosis Date    Allergy     Arthritis     CAD, multiple vessel     DR Melissa    Cataract     Depression     History of colon polyps     Hyperlipidemia     Hypertension     Macular degeneration     Dr Razo for injectionJennifer for eye    S/P CABG x 2     Type 2 diabetes mellitus with circulatory disorder     Dr. Aquino       Past Surgical History:   Procedure Laterality Date    broken elbow      left    COLONOSCOPY N/A 10/4/2017    Procedure: COLONOSCOPY;  Surgeon: Gabriel Leung MD;  Location: [facility];  Service: Endoscopy;  Laterality: N/A;    CRANIOTOMY FOR EVACUATION OF SUBDURAL HEMATOMA Right 10/18/2024    Procedure: CRANIOTOMY, FOR SUBDURAL HEMATOMA EVACUATION;  Surgeon: Laurence Condon MD;  Location: [address];  Service: Neurosurgery;  Laterality: Right;    CRANIOTOMY FOR EVACUATION OF SUBDURAL HEMATOMA Right 10/18/2024    Procedure: CRANIOTOMY, Right supraorbital FOR " "HEMATOMA EVACUATION;  Surgeon: Laurence Condon MD;  Location: [facility] OR [address] [place] [address];  Service: Neurosurgery;  Laterality: Right;    EYE SURGERY      cataract    heart bypass      2004    HERNIA REPAIR      right    ROTATOR CUFF REPAIR      right  2004       Review of patient's allergies indicates:   Allergen Reactions    Aricept odt [donepezil] Diarrhea and Nausea And Vomiting    Codeine Nausea Only     weak    Ranexa [ranolazine]        Medications:  Medications Prior to Admission   Medication Sig    acetaminophen (TYLENOL) 500 MG tablet Take 1 tablet (500 mg total) by mouth every 6 (six) hours as needed.    albuterol (PROVENTIL) 2.5 mg /3 mL (0.083 %) nebulizer solution Take 3 mLs (2.5 mg total) by nebulization every 6 to 8 hours as needed for Wheezing.    albuterol (PROVENTIL/VENTOLIN HFA) 90 mcg/actuation inhaler INHALE 2 PUFFS BY MOUTH EVERY 6 HOURS AS NEEDED FOR WHEEZING OR  SHORTNESS  OF  BREATH    atorvastatin (LIPITOR) 40 MG tablet Take 1 tablet by mouth every morning.    BD INSULIN PEN NEEDLE UF SHORT 31 gauge x 5/16" Ndle     BD INSULIN SYRINGE ULTRA-FINE 1/2 mL 31 gauge x 15/64" Syrg     blood sugar diagnostic Strp To check BG 3 times daily, to use with insurance preferred meter    carvediloL (COREG) 3.125 MG tablet Take 1 tablet (3.125 mg total) by mouth 2 (two) times daily with meals.    ceramides 1,3,6-II (CERAVE DAILY MOISTURIZING) Lotn Apply twice daily to skin    cinnamon bark 500 mg capsule Take 1,000 mg by mouth once daily.      clopidogreL (PLAVIX) 75 mg tablet Take 75 mg by mouth.    diclofenac sodium (VOLTAREN) 1 % Gel Apply 2 g topically 3 (three) times daily.    DULoxetine (CYMBALTA) 60 MG capsule Take 1 capsule (60 mg total) by mouth once daily.    fluticasone propionate (FLONASE) 50 mcg/actuation nasal spray 1 spray (50 mcg total) by Each Nostril route 2 (two) times daily.    furosemide (LASIX) 40 MG tablet Take 40 mg by mouth once daily.    gabapentin (NEURONTIN) 300 MG capsule Take 4 " "capsules (1,200 mg total) by mouth 2 (two) times daily.    glimepiride (AMARYL) 4 MG tablet Take 4 mg by mouth daily with breakfast.     HYDROcodone-acetaminophen (NORCO) 7.5-325 mg per tablet Take 1 tablet by mouth every 8 (eight) hours as needed for Pain.    [START ON 11/3/2024] HYDROcodone-acetaminophen (NORCO) 7.5-325 mg per tablet Take 1 tablet by mouth every 8 (eight) hours as needed for Pain.    insulin NPH-insulin regular, 70/30, (NOVOLIN 70/30) 100 unit/mL (70-30) injection Inject into the skin. Inject 10 units at breakfast and inject 10 units at night    insulin syringe-needle U-100 0.3 mL 31 gauge x 15/64" Syrg USE TO INJECT INSULIN THREE TIMES DAILY    insulin syringe-needle U-100 1/2 mL 31 x 5/16" Syrg     isosorbide mononitrate (IMDUR) 30 MG 24 hr tablet Take 30 mg by mouth once daily.    lancets Misc To check BG 3 times daily, to use with insurance preferred meter    levETIRAcetam (KEPPRA) 500 MG Tab Take 1 tablet (500 mg total) by mouth 2 (two) times daily. for 7 days    LIDOcaine (LIDODERM) 5 % Place 1 patch onto the skin once daily. Remove & Discard patch within 12 hours or as directed by MD    meclizine (ANTIVERT) 12.5 mg tablet Take 1 tablet (12.5 mg total) by mouth 3 (three) times daily as needed for Dizziness.    methocarbamoL (ROBAXIN) 500 MG Tab TAKE 1 TABLET BY MOUTH 4 TIMES DAILY FOR 10 DAYS    mupirocin (BACTROBAN) 2 % ointment Apply topically 3 (three) times daily.    nitroGLYCERIN (NITROSTAT) 0.4 MG SL tablet DISSOLVE 1 TABLET UNDER THE TONGUE EVERY 5 MINUTES AS  NEEDED FOR CHEST PAIN. MAX  OF 3 TABLETS IN 15 MINUTES. CALL 911 IF PAIN PERSISTS.    omeprazole (PRILOSEC) 20 MG capsule Take 1 capsule (20 mg total) by mouth once daily.    pravastatin (PRAVACHOL) 20 MG tablet Take 1 tablet (20 mg total) by mouth once daily.    spironolactone (ALDACTONE) 25 MG tablet Take 0.5 tablets (12.5 mg total) by mouth once daily.    tiotropium-olodateroL (STIOLTO RESPIMAT) 2.5-2.5 mcg/actuation Mist " "Inhale 2 puffs into the lungs once daily. Controller    triamcinolone acetonide 0.1% (KENALOG) 0.1 % cream SMARTSI Application Topical 2-3 Times Daily    valsartan (DIOVAN) 40 MG tablet Take 1 tablet (40 mg total) by mouth once daily.    VIT C/VIT E AC/LUT/COPPER/ZINC (PRESERVISION LUTEIN ORAL) Take by mouth.    warfarin (COUMADIN) 5 MG tablet Take 2 tabs by mouth on Tuesday,Thursday, Saturday and Sundays then 1.5 on all other days.    [DISCONTINUED] atorvastatin (LIPITOR) 40 MG tablet Take 40 mg by mouth once daily.     Antibiotics (From admission, onward)      Start     Stop Route Frequency Ordered    10/28/24 1130  vancomycin 1,250 mg in D5W 250 mL IVPB (admixture device)         -- IV Every 24 hours (non-standard times) 10/27/24 1003    10/27/24 1100  piperacillin-tazobactam (ZOSYN) 4.5 g in D5W 100 mL IVPB (MB+)         -- IV Every 8 hours (non-standard times) 10/27/24 0956    10/27/24 1055  vancomycin - pharmacy to dose  (vancomycin IVPB (PEDS and ADULTS))        Placed in "And" Linked Group    -- IV pharmacy to manage frequency 10/27/24 0955          Antifungals (From admission, onward)      None          Antivirals (From admission, onward)      None             Immunization History   Administered Date(s) Administered    COVID-19, MRNA, LN-S, PF (Pfizer) (Gray Cap) 2022    COVID-19, mRNA, LNP-S, PF, husam-sucrose, 30 mcg/0.3 mL (Pfizer Ages 12+) 10/02/2023, 2024    COVID-19, mRNA, LNP-S, bivalent booster, PF (PFIZER OMICRON) 10/06/2022    COVID-19, vector-nr, rS-Ad26, PF (Esther) 2021, 12/15/2021    Influenza - Quadrivalent - High Dose - PF (65 years and older) 2020, 10/13/2021, 10/06/2022    Influenza - Quadrivalent - PF *Preferred* (6 months and older) 10/02/2023    Influenza - Trivalent - Fluad - Adjuvanted - PF (65 years and older 10/14/2017, 2024    Influenza - Trivalent - Fluzone High Dose - PF (65 years and older) 2014, 10/01/2015, 10/26/2016, 2018, " 2019    Influenza Split 2005, 10/21/2013    Pneumococcal Conjugate - 13 Valent 2016    Pneumococcal Conjugate - 20 Valent 2023    Pneumococcal Polysaccharide - 23 Valent 2015    RSVpreF (Arexvy) 2024    Tdap 2013, 2023    Zoster 2012    Zoster Recombinant 2020, 2021       Family History       Problem Relation (Age of Onset)    Arthritis Mother    Cancer Brother, Maternal Grandfather    Diabetes Mother, Maternal Aunt, Maternal Uncle, Paternal Aunt    Heart attack Father    Heart disease Maternal Uncle, Paternal Aunt, Paternal Uncle, Maternal Grandmother    Stroke Mother    Throat cancer Brother          Social History     Socioeconomic History    Marital status:     Number of children: 4    Years of education: GED   Occupational History    Occupation: retired tug    Tobacco Use    Smoking status: Former     Current packs/day: 0.00     Average packs/day: 3.0 packs/day for 45.0 years (135.0 ttl pk-yrs)     Types: Cigarettes     Start date: 1959     Quit date: 2004     Years since quittin.5    Smokeless tobacco: Former   Substance and Sexual Activity    Alcohol use: Yes     Comment: was heavy drinker 35 years ago, rare use now    Drug use: No    Sexual activity: Yes     Partners: Female     Social Drivers of Health     Financial Resource Strain: Low Risk  (10/22/2024)    Overall Financial Resource Strain (CARDIA)     Difficulty of Paying Living Expenses: Not very hard   Food Insecurity: No Food Insecurity (10/22/2024)    Hunger Vital Sign     Worried About Running Out of Food in the Last Year: Never true     Ran Out of Food in the Last Year: Never true   Transportation Needs: No Transportation Needs (10/22/2024)    TRANSPORTATION NEEDS     Transportation : No   Physical Activity: Inactive (10/18/2024)    Exercise Vital Sign     Days of Exercise per Week: 0 days     Minutes of Exercise per Session: 0 min   Stress: No Stress  Concern Present (10/22/2024)    [employer] [place] [employer] - Occupational Stress Questionnaire     Feeling of Stress : Not at all   Housing Stability: Low Risk  (10/22/2024)    Housing Stability Vital Sign     Unable to Pay for Housing in the Last Year: No     Homeless in the Last Year: No     Review of Systems   Unable to perform ROS: Intubated     Objective:     Vital Signs (Most Recent):  Temp: 98.9 °F (37.2 °C) (10/29/24 0701)  Pulse: 94 (10/29/24 1113)  Resp: 19 (10/29/24 1113)  BP: 126/60 (10/29/24 0905)  SpO2: 100 % (10/29/24 1113) Vital Signs (24h Range):  Temp:  [94.6 °F (34.8 °C)-100.5 °F (38.1 °C)] 98.9 °F (37.2 °C)  Pulse:  [75-95] 94  Resp:  [16-21] 19  SpO2:  [97 %-100 %] 100 %  BP: (120-158)/(59-78) 126/60  Arterial Line BP: (129-153)/(54-63) 148/63     Weight: 83.5 kg (184 lb)  Body mass index is 27.98 kg/m².    Estimated Creatinine Clearance: 37.5 mL/min (A) (based on SCr of 1.6 mg/dL (H)).     Physical Exam  Vitals and nursing note reviewed.   Constitutional:       Appearance: He is ill-appearing.      Interventions: He is sedated and intubated.   HENT:      Mouth/Throat:      Comments: White plaque on surface of tongue beneath ET tube.  Cardiovascular:      Rate and Rhythm: Normal rate and regular rhythm.      Heart sounds: No murmur heard.  Pulmonary:      Effort: Pulmonary effort is normal. He is intubated.      Breath sounds: Normal breath sounds.   Abdominal:      General: Bowel sounds are normal. There is no distension.      Palpations: Abdomen is soft.      Tenderness: There is no abdominal tenderness.   Skin:     General: Skin is warm and dry.   Neurological:      Mental Status: He is lethargic.          Significant Labs: CBC:   Recent Labs   Lab 10/28/24  0029 10/28/24  0218 10/29/24  0258   WBC 15.34* 15.66* 13.51*   HGB 7.0* 7.1* 7.6*   HCT 23.5* 22.6* 24.2*    172 178     CMP:   Recent Labs   Lab 10/28/24  0029 10/29/24  0258    143   K 5.1 4.9    109    CO2 23 24   * 197*   BUN 41* 45*   CREATININE 1.4 1.6*   CALCIUM 8.4* 8.7   PROT 5.8* 6.4   ALBUMIN 1.8* 1.9*   BILITOT 0.4 0.4   ALKPHOS 92 101   AST 47* 42*   ALT 12 15   ANIONGAP 9 10     Microbiology Results (last 7 days)       Procedure Component Value Units Date/Time    Blood culture [[id number]] Collected: 10/28/24 1129    Order Status: Completed Specimen: Blood from Peripheral, Lower Leg, Left Updated: 10/29/24 1212     Blood Culture, Routine No Growth to date      No Growth to date    Blood culture [[id number]] Collected: 10/28/24 1137    Order Status: Completed Specimen: Blood from Peripheral, Lower Leg, Right Updated: 10/29/24 1212     Blood Culture, Routine No Growth to date      No Growth to date    Culture, Respiratory with Gram Stain [[id number]]  (Abnormal) Collected: 10/27/24 1008    Order Status: Completed Specimen: Respiratory from Endotracheal Aspirate Updated: 10/29/24 0933     Respiratory Culture No S aureus or Pseudomonas isolated.      ANASTASIIA LUSITANIAE  Many  Normal respiratory pam also present       Gram Stain (Respiratory) <10 epithelial cells per low power field.     Gram Stain (Respiratory) Rare WBC's     Gram Stain (Respiratory) Rare yeast    Blood culture [[id number]] Collected: 10/18/24 0335    Order Status: Completed Specimen: Blood from Peripheral, Foot, Right Updated: 10/23/24 0612     Blood Culture, Routine No growth after 5 days.    Blood culture [[id number]] Collected: 10/18/24 0345    Order Status: Completed Specimen: Blood from Peripheral, Hand, Left Updated: 10/23/24 0612     Blood Culture, Routine No growth after 5 days.            Significant Imaging: I have reviewed all pertinent imaging results/findings within the past 24 hours.

## 2024-10-29 NOTE — ASSESSMENT & PLAN NOTE
Elevated WBC 13.5 today. Procal elevated at 0.82.  - Repeat procal at .74  - receiving zosyn, vanc discontinued  - Respiratory cx growing Candida lusitaniae     - ID consulted, candida likely respiratory colonizer and not needing treatment in immunocompetent patient

## 2024-10-29 NOTE — PROGRESS NOTES
[facility] - Neuro Critical Care  Neurocritical Care  Progress Note    Admit Date: 10/17/2024  Service Date: 10/29/2024  Length of Stay: 12    Subjective:     Chief Complaint: Traumatic right-sided intracerebral hemorrhage without loss of consciousness    History of Present Illness: Percy Ramirez is a 83M PMHx of HTN, HLD, DM2, CAD s/p CABG x2, Afib on Coumadin, ILD on 4L of O2, HFrEF (45%), presenting as a transfer for a large traumatic R frontal IPH. Pt initially presented to [facility] ED yesterday ago after mechanical fall and was found to have 4mm parafalcine SDH. Repeat CTH was stable. Pt was discharged home on keppra and instructed to hold his blood thinners. On 10/17/24 pt was lethargic and fell. He was brought back to  ED. Blood glucose was >600 and he was febrile (101F). CTH revealed 5cm R frontal IPH with 7mm MLS. Kcentra, 10mg vitamin K, and 3% HTS bolus were given. He was transferred to [facility] for higher level of care.     Hospital Course: 10/19/2024: POD #1 from both his MI LS as well as his right subdural/intraparenchymal hemorrhage evacuation.  He is still intubated and although sedation is off, he is minimally responsive. Coreg doubled. NG tube placed and tube feeds started. Still on insulin drip.  10/20/2024: POD #2 s/p crani for SDH evacuation. Leukocytosis noted post-op, no accompanying fever/chills. Straight cath x1. Tachycardia responding to IVF boluses. Tube feeds started, will titrate to goal. Insulin gtt continued, plans to transition to subQ tomorrow.  10/21/2021: POD#3. EEG placed this morning, revealing multiple r-sided seizures. Keppra load completed and maintenance dose increased. Concern for LUE no longer WD, stat head CT showing new/increased hyperdensity in resection bed w/o worsening mass effect or MLS. 1L LR given for LEANDRO. Will continue to monitor Na.   10/22/24: POD#4. Addl seizures despite keppra load. Vimpat and propofol started yesterday with significant reduction in number.  1U PRBC transfused overnight. Required levo for short period of time after initiation of propofol.  10/23/24: POD#5.  Patient continues to be hyponatremic.  Free water volume increased.  Patient with total of 11 seizures yesterday.  Vimpat and propofol dosing increased with improvement.  10/24/24: POD#6. NGT adjusted overnight. No additional seizures on current AEM regimen. Platelets and Hgb decreasing, will order peripheral smear to assess for HIT  10/25/2024 POD#7. Addl seizure, Onfi added to AEM regimen. Drains removed by NSGY. Will attempt to wean propofol tomorrow.   10/26/2024 POD#8. MRI complete yx. Weaning propofol today.   10/27/2024 POD#9. Patient began having seizures again overnight following cessation of propofol gtt. Resumed propofol at 10cc/hr for now, increased Keppra dose, and plan to attempt to wean again tomorrow. Persistent hyperglycemia on tube feeds, optimizing insulin regimen. Leukocytosis pending respiratory cultures.  10/28/2024 POD10. No addl seizures overnight. Propofol turned off this morning. Clobazam dosing decreased as well. Resp cultures negative, but will obtain blood and urine cultures. Cont abx. NSGY signing off.   10/29/2024 POD11. No seizures. Clobazam down to 10mg QHS. Infiltrate developing RLL, sputum culture likely due to colonization of resp tract per ID.     Interval History:      No seizures overnight. Resp cultures growing Candida lusitaniae. Bloody secretions noted. ID consulted - no treatment necessary as this can be common colonizer. Onfi decreased to 10mg QHS. Cr increasing, possibly due to vancomycin. Family to be present tomorrow for total update.     Objective:     Vitals:  Temp: 99 °F (37.2 °C)  Pulse: 87  Rhythm: atrial rhythm  BP: 123/69  MAP (mmHg): 89  Resp: 16  SpO2: 100 %  Oxygen Concentration (%): 40  Vent Mode: A/C  Set Rate: 16 BPM  Vt Set: 500 mL  PEEP/CPAP: 5 cmH20  Peak Airway Pressure: 36 cmH20  Mean Airway Pressure: 13 cmH20  Plateau Pressure: 13  cmH20    Temp  Min: 94.6 °F (34.8 °C)  Max: 100.5 °F (38.1 °C)  Pulse  Min: 75  Max: 96  BP  Min: 120/59  Max: 158/73  MAP (mmHg)  Min: 84  Max: 109  Resp  Min: 16  Max: 28  SpO2  Min: 97 %  Max: 100 %  Oxygen Concentration (%)  Min: 40  Max: 40    10/28 0701 - 10/29 0700  In: 1590 [I.V.:20.5]  Out: 1250 [Urine:1250]   Unmeasured Output  Urine Occurrence: 1  Stool Occurrence: 1  Pad Count: 1        Physical Exam  Constitutional:       Appearance: He is not toxic-appearing.      Interventions: He is intubated.   Cardiovascular:      Rate and Rhythm: Normal rate.      Comments: Afib on monitor  Pulmonary:      Effort: No respiratory distress. He is intubated.      Comments: Coarse breath sounds on R  Abdominal:      General: There is no distension.      Palpations: Abdomen is soft.      Tenderness: There is no abdominal tenderness.   Musculoskeletal:         General: Swelling (LUE) present.   Skin:     General: Skin is warm and dry.   Neurological:      Comments: Unarousable          Medications:  [id number]    Scheduledatorvastatin, 40 mg, Daily  cloBAZam, 10 mg, QHS  famotidine, 20 mg, Daily  heparin (porcine), 5,000 Units, Q8H  insulin aspart U-100, 10 Units, 6 times per day  insulin glargine U-100, 28 Units, Daily  lacosamide (Vimpat) IV orderable, 200 mg, Q12H  levETIRAcetam (Keppra) IV (PEDS and ADULTS), 1,500 mg, Q12H  piperacillin-tazobactam (Zosyn) IV (PEDS and ADULTS) (extended infusion is not appropriate), 4.5 g, Q8H  silodosin, 4 mg, Daily    PRNacetaminophen, 650 mg, Q6H PRN  D10W, , Continuous PRN  dextrose 10%, 12.5 g, PRN  dextrose 10%, 25 g, PRN  fentaNYL, 50 mcg, Q1H PRN  glucagon (human recombinant), 1 mg, PRN  hydrALAZINE, 10 mg, Q4H PRN  insulin aspart U-100, 0-10 Units, Q4H PRN  labetalol, 10 mg, Q4H PRN  magnesium oxide, 800 mg, PRN  magnesium oxide, 800 mg, PRN  ondansetron, 4 mg, Q8H PRN  potassium bicarbonate, 35 mEq, PRN  potassium bicarbonate, 50 mEq, PRN  potassium bicarbonate, 60 mEq,  PRN  potassium, sodium phosphates, 2 packet, PRN  potassium, sodium phosphates, 2 packet, PRN  potassium, sodium phosphates, 2 packet, PRN  vancomycin - pharmacy to dose, , pharmacy to manage frequency      Today I personally reviewed pertinent medications, imaging, laboratory results, notably:    Diet  Diet NPO Except for: Medication  Diet NPO Except for: Medication  Assessment/Plan:     Neuro  * Traumatic right-sided intracerebral hemorrhage without loss of consciousness  83M PMHx of HTN, HLD, DM2, CAD s/p CABG x2, Afib on Coumadin, ILD on 4L of O2, HFrEF (45%), presenting as a transfer for a large traumatic R frontal IPH reversed with Kcentra and vitamin K with repeat INR 1.2.    Admit to NCC  q1h neuro checks, vitals, and I&O's  CBC, CMP, mag, and phos daily   Coags, TSH, A1c, lipid panel, UA  Echo, EKG, CXR  SBP <160  Na goals > 135  NSGY signed off  NPO   SCDs; heparin for VTE prophylaxis  PT/OT/SLP when extubated    Altered mental status  See primary problem    Intracranial hemorrhage  See primary problem    Subdural hematoma  See primary problem    Focal seizures  EEG placed morning of 10/21 due to delay in return to expected LOC  -Multiple seizures throughout the day  -Has been loaded with keppra and vimpat  -Addl seizure evening of 10/24, Onfi added on.   -Current AED regimen:   - Keppra 1500 BID   - Vimpat 200 BID   - Onfi 10 QHS  -No seizures 10/29    Psychiatric  Major depressive disorder, recurrent episode, mild  Restart home SSRI once able to take p.o.    Pulmonary  ILD (interstitial lung disease)  ILD on 4L of O2 at home    SpO2 goal > 88%  Duo nebs and tiotropium  Currently intubated    Cardiac/Vascular  Chronic combined systolic and diastolic heart failure  06/29/2024 echo with EF 45%, was previously 30-40%    Repeat echo shows his EF is still 45%  HDS    Persistent atrial fibrillation  In atrial fibrillation on admit  Hold Coumadin in setting of acute intraparenchymal hemorrhage  Heparin subcu for  VTE prophylaxis    Hyperlipidemia LDL goal <100  Lipid panel showed low LDL, low HDL, low total cholesterol  Atorvastatin    Coronary artery disease  S/p CABG x2 in 2004  Patient has reportedly not been on Plavix recently because of his Coumadin      Benign hypertension with chronic kidney disease, stage III  SBP goal < 160  PRN labetalol and hydralazine   Propofol discontinued, will schedule home meds as needed for HTN      Renal/  LEANDRO (acute kidney injury)  LEANDRO on CKD, Cr 1.8 on initial presentation to ED    Recent Labs     10/27/24  0030 10/28/24  0029 10/29/24  0258   CREATININE 1.2 1.4 1.6*   EGFRNORACEVR >60.0 50.2* 42.8*        Plan  - LEANDRO is resolved - Cr increasing again, possibly due to vanc?  - Avoid nephrotoxins and renally dose meds for GFR listed above  - Monitor urine output, serial BMP, and adjust therapy as needed  - Q1H I&Os    Chronic kidney disease, stage 3a  Cr 1.8 on initial presentation to ED, increased to 2.6, LEANDRO now resolved    Creatine stable for now. BMP reviewed- noted Estimated Creatinine Clearance: 37.5 mL/min (A) (based on SCr of 1.6 mg/dL (H)). according to latest data. Based on current GFR, CKD stage is stage 3 - GFR 30-59.  Monitor UOP and serial BMP and adjust therapy as needed. Renally dose meds. Avoid nephrotoxic medications and procedures.    Oncology  Leukocytosis  Elevated WBC 13.5 today. Procal elevated at 0.82.  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 Beta hydroxybutyrate and urine ketones negative. Serum CO2 22. Given SQ insulin at OSH.  On arrival to [facility], . Hb A1c 9.9% on admit.     Patient's FSGs are uncontrolled due to hyperglycemia on current medication regimen.  Last  A1c reviewed-   Lab Results   Component Value Date    LABA1C 6.8 10/09/2017    HGBA1C 9.9 (H) 10/17/2024     Most recent fingerstick glucose reviewed-   Recent Labs   Lab 10/29/24  0017 10/29/24  0403 10/29/24  0753 10/29/24  1121   POCTGLUCOSE 257* 232* 252* 208*     Current correctional scale  Medium  Increase anti-hyperglycemic dose as follows-   Antihyperglycemics (From admission, onward)      Start     Stop Route Frequency Ordered    10/29/24 1600  insulin aspart U-100 pen 12 Units         -- SubQ 6 times per day 10/29/24 1535    10/28/24 0900  insulin glargine U-100 (Lantus) pen 28 Units         -- SubQ Daily 10/28/24 0853    10/25/24 1133  insulin aspart U-100 pen 0-10 Units         -- SubQ Every 4 hours PRN 10/25/24 1036    10/22/24 1400  insulin regular in 0.9 % NaCl 100 unit/100 mL (1 unit/mL) infusion        Question Answer Comment   Insulin Rate Adjustment (DO NOT MODIFY ANSWER) [email or web address]    Enter initial dose from Infusion Protocol Chart (Units/hr): 1.5        10/25/24 1431 IV Continuous 10/22/24 1252    10/21/24 1145  insulin regular in 0.9 % NaCl 100 unit/100 mL (1 unit/mL) infusion        Question:  Enter initial dose from Infusion Protocol Chart (Units/hr):  Answer:  1.65    10/21/24 1343 IV Continuous 10/21/24 1042          Hold Oral hypoglycemics while patient is in the hospital.      Other  Obstructive sleep apnea treated with BiPAP  Currently intubated.  Daily ABG  Daily CXR          The patient is being Prophylaxed for:  Venous Thromboembolism with: Mechanical or Chemical  Stress Ulcer with: H2B  Ventilator Pneumonia with: chlorhexidine oral care    Activity Orders            Turn patient starting at 10/18 0000    Diet NPO Except for: Medication: NPO starting at 10/17 0460          Full Code    Dolores Moore DO  Neurocritical Care  [facility] - Neuro Critical Care

## 2024-10-30 ENCOUNTER — ANESTHESIA EVENT (OUTPATIENT)
Dept: SURGERY | Facility: HOSPITAL | Age: 84
End: 2024-10-30
Payer: MEDICARE

## 2024-10-30 LAB
ALBUMIN SERPL BCP-MCNC: 1.8 G/DL (ref 3.5–5.2)
ALLENS TEST: ABNORMAL
ALP SERPL-CCNC: 105 U/L (ref 40–150)
ALT SERPL W/O P-5'-P-CCNC: 16 U/L (ref 10–44)
ANION GAP SERPL CALC-SCNC: 10 MMOL/L (ref 8–16)
ANISOCYTOSIS BLD QL SMEAR: SLIGHT
ANISOCYTOSIS BLD QL SMEAR: SLIGHT
AST SERPL-CCNC: 32 U/L (ref 10–40)
BASO STIPL BLD QL SMEAR: ABNORMAL
BASOPHILS # BLD AUTO: ABNORMAL K/UL (ref 0–0.2)
BASOPHILS # BLD AUTO: ABNORMAL K/UL (ref 0–0.2)
BASOPHILS NFR BLD: 0 % (ref 0–1.9)
BASOPHILS NFR BLD: 0 % (ref 0–1.9)
BILIRUB SERPL-MCNC: 0.3 MG/DL (ref 0.1–1)
BUN SERPL-MCNC: 41 MG/DL (ref 8–23)
CALCIUM SERPL-MCNC: 8.8 MG/DL (ref 8.7–10.5)
CHLORIDE SERPL-SCNC: 109 MMOL/L (ref 95–110)
CO2 SERPL-SCNC: 25 MMOL/L (ref 23–29)
CREAT SERPL-MCNC: 1.6 MG/DL (ref 0.5–1.4)
DELSYS: ABNORMAL
DIFFERENTIAL METHOD BLD: ABNORMAL
DIFFERENTIAL METHOD BLD: ABNORMAL
DOHLE BOD BLD QL SMEAR: PRESENT
EOSINOPHIL # BLD AUTO: ABNORMAL K/UL (ref 0–0.5)
EOSINOPHIL # BLD AUTO: ABNORMAL K/UL (ref 0–0.5)
EOSINOPHIL NFR BLD: 0 % (ref 0–8)
EOSINOPHIL NFR BLD: 5 % (ref 0–8)
ERYTHROCYTE [DISTWIDTH] IN BLOOD BY AUTOMATED COUNT: 16.5 % (ref 11.5–14.5)
ERYTHROCYTE [DISTWIDTH] IN BLOOD BY AUTOMATED COUNT: 16.8 % (ref 11.5–14.5)
ERYTHROCYTE [SEDIMENTATION RATE] IN BLOOD BY WESTERGREN METHOD: 16 MM/H
EST. GFR  (NO RACE VARIABLE): 42.8 ML/MIN/1.73 M^2
FIO2: 40
GLUCOSE SERPL-MCNC: 173 MG/DL (ref 70–110)
HCO3 UR-SCNC: 27.9 MMOL/L (ref 24–28)
HCT VFR BLD AUTO: 22.1 % (ref 40–54)
HCT VFR BLD AUTO: 24 % (ref 40–54)
HGB BLD-MCNC: 6.7 G/DL (ref 14–18)
HGB BLD-MCNC: 7.1 G/DL (ref 14–18)
HYPOCHROMIA BLD QL SMEAR: ABNORMAL
IMM GRANULOCYTES # BLD AUTO: ABNORMAL K/UL (ref 0–0.04)
IMM GRANULOCYTES # BLD AUTO: ABNORMAL K/UL (ref 0–0.04)
IMM GRANULOCYTES NFR BLD AUTO: ABNORMAL % (ref 0–0.5)
IMM GRANULOCYTES NFR BLD AUTO: ABNORMAL % (ref 0–0.5)
LYMPHOCYTES # BLD AUTO: ABNORMAL K/UL (ref 1–4.8)
LYMPHOCYTES # BLD AUTO: ABNORMAL K/UL (ref 1–4.8)
LYMPHOCYTES NFR BLD: 5 % (ref 18–48)
LYMPHOCYTES NFR BLD: 8 % (ref 18–48)
MAGNESIUM SERPL-MCNC: 2.7 MG/DL (ref 1.6–2.6)
MCH RBC QN AUTO: 28.1 PG (ref 27–31)
MCH RBC QN AUTO: 28.3 PG (ref 27–31)
MCHC RBC AUTO-ENTMCNC: 29.6 G/DL (ref 32–36)
MCHC RBC AUTO-ENTMCNC: 30.3 G/DL (ref 32–36)
MCV RBC AUTO: 93 FL (ref 82–98)
MCV RBC AUTO: 95 FL (ref 82–98)
METAMYELOCYTES NFR BLD MANUAL: 1 %
METAMYELOCYTES NFR BLD MANUAL: 2 %
MODE: ABNORMAL
MONOCYTES # BLD AUTO: ABNORMAL K/UL (ref 0.3–1)
MONOCYTES # BLD AUTO: ABNORMAL K/UL (ref 0.3–1)
MONOCYTES NFR BLD: 7 % (ref 4–15)
MONOCYTES NFR BLD: 7 % (ref 4–15)
MYELOCYTES NFR BLD MANUAL: 1 %
MYELOCYTES NFR BLD MANUAL: 1 %
NEUTROPHILS NFR BLD: 79 % (ref 38–73)
NEUTROPHILS NFR BLD: 80 % (ref 38–73)
NEUTS BAND NFR BLD MANUAL: 1 %
NEUTS BAND NFR BLD MANUAL: 3 %
NRBC BLD-RTO: 1 /100 WBC
NRBC BLD-RTO: 1 /100 WBC
OVALOCYTES BLD QL SMEAR: ABNORMAL
OVALOCYTES BLD QL SMEAR: ABNORMAL
PCO2 BLDA: 42.8 MMHG (ref 35–45)
PEEP: 5
PH SMN: 7.42 [PH] (ref 7.35–7.45)
PHOSPHATE SERPL-MCNC: 3.6 MG/DL (ref 2.7–4.5)
PLATELET # BLD AUTO: 175 K/UL (ref 150–450)
PLATELET # BLD AUTO: 204 K/UL (ref 150–450)
PLATELET BLD QL SMEAR: ABNORMAL
PLATELET BLD QL SMEAR: ABNORMAL
PMV BLD AUTO: 11.1 FL (ref 9.2–12.9)
PMV BLD AUTO: 11.3 FL (ref 9.2–12.9)
PO2 BLDA: 95 MMHG (ref 80–100)
POC BE: 4 MMOL/L
POC SATURATED O2: 97 % (ref 95–100)
POC TCO2: 29 MMOL/L (ref 23–27)
POCT GLUCOSE: 131 MG/DL (ref 70–110)
POCT GLUCOSE: 149 MG/DL (ref 70–110)
POCT GLUCOSE: 158 MG/DL (ref 70–110)
POCT GLUCOSE: 185 MG/DL (ref 70–110)
POCT GLUCOSE: 212 MG/DL (ref 70–110)
POCT GLUCOSE: 212 MG/DL (ref 70–110)
POIKILOCYTOSIS BLD QL SMEAR: SLIGHT
POIKILOCYTOSIS BLD QL SMEAR: SLIGHT
POLYCHROMASIA BLD QL SMEAR: ABNORMAL
POLYCHROMASIA BLD QL SMEAR: ABNORMAL
POTASSIUM SERPL-SCNC: 4.6 MMOL/L (ref 3.5–5.1)
PROT SERPL-MCNC: 6.4 G/DL (ref 6–8.4)
RBC # BLD AUTO: 2.37 M/UL (ref 4.6–6.2)
RBC # BLD AUTO: 2.53 M/UL (ref 4.6–6.2)
SAMPLE: ABNORMAL
SITE: ABNORMAL
SODIUM SERPL-SCNC: 144 MMOL/L (ref 136–145)
SP02: 100
SPHEROCYTES BLD QL SMEAR: ABNORMAL
TOXIC GRANULES BLD QL SMEAR: PRESENT
VT: 500
WBC # BLD AUTO: 12.51 K/UL (ref 3.9–12.7)
WBC # BLD AUTO: 14.11 K/UL (ref 3.9–12.7)

## 2024-10-30 PROCEDURE — 25000003 PHARM REV CODE 250

## 2024-10-30 PROCEDURE — 82803 BLOOD GASES ANY COMBINATION: CPT

## 2024-10-30 PROCEDURE — 85027 COMPLETE CBC AUTOMATED: CPT | Mod: 91

## 2024-10-30 PROCEDURE — 99291 CRITICAL CARE FIRST HOUR: CPT | Mod: GC,,, | Performed by: INTERNAL MEDICINE

## 2024-10-30 PROCEDURE — 27200966 HC CLOSED SUCTION SYSTEM

## 2024-10-30 PROCEDURE — C9254 INJECTION, LACOSAMIDE: HCPCS

## 2024-10-30 PROCEDURE — 36600 WITHDRAWAL OF ARTERIAL BLOOD: CPT

## 2024-10-30 PROCEDURE — 83735 ASSAY OF MAGNESIUM: CPT

## 2024-10-30 PROCEDURE — 63600175 PHARM REV CODE 636 W HCPCS: Performed by: NURSE PRACTITIONER

## 2024-10-30 PROCEDURE — 63600175 PHARM REV CODE 636 W HCPCS

## 2024-10-30 PROCEDURE — 95714 VEEG EA 12-26 HR UNMNTR: CPT

## 2024-10-30 PROCEDURE — 94003 VENT MGMT INPAT SUBQ DAY: CPT

## 2024-10-30 PROCEDURE — 99233 SBSQ HOSP IP/OBS HIGH 50: CPT | Mod: ,,, | Performed by: PHYSICIAN ASSISTANT

## 2024-10-30 PROCEDURE — 80053 COMPREHEN METABOLIC PANEL: CPT

## 2024-10-30 PROCEDURE — 25000003 PHARM REV CODE 250: Performed by: STUDENT IN AN ORGANIZED HEALTH CARE EDUCATION/TRAINING PROGRAM

## 2024-10-30 PROCEDURE — 85007 BL SMEAR W/DIFF WBC COUNT: CPT | Mod: 91

## 2024-10-30 PROCEDURE — 85007 BL SMEAR W/DIFF WBC COUNT: CPT

## 2024-10-30 PROCEDURE — 99900035 HC TECH TIME PER 15 MIN (STAT)

## 2024-10-30 PROCEDURE — 85027 COMPLETE CBC AUTOMATED: CPT

## 2024-10-30 PROCEDURE — 94799 UNLISTED PULMONARY SVC/PX: CPT

## 2024-10-30 PROCEDURE — 99900026 HC AIRWAY MAINTENANCE (STAT)

## 2024-10-30 PROCEDURE — 27100171 HC OXYGEN HIGH FLOW UP TO 24 HOURS

## 2024-10-30 PROCEDURE — 25000003 PHARM REV CODE 250: Performed by: INTERNAL MEDICINE

## 2024-10-30 PROCEDURE — 84100 ASSAY OF PHOSPHORUS: CPT

## 2024-10-30 PROCEDURE — 20000000 HC ICU ROOM

## 2024-10-30 PROCEDURE — 94761 N-INVAS EAR/PLS OXIMETRY MLT: CPT | Mod: XB

## 2024-10-30 PROCEDURE — 25000003 PHARM REV CODE 250: Performed by: NURSE PRACTITIONER

## 2024-10-30 PROCEDURE — 63600175 PHARM REV CODE 636 W HCPCS: Mod: JZ,JG

## 2024-10-30 RX ORDER — LACOSAMIDE 50 MG/1
200 TABLET ORAL EVERY 12 HOURS
Status: DISCONTINUED | OUTPATIENT
Start: 2024-10-30 | End: 2024-11-01

## 2024-10-30 RX ORDER — LEVETIRACETAM 100 MG/ML
1500 SOLUTION ORAL 2 TIMES DAILY
Status: DISCONTINUED | OUTPATIENT
Start: 2024-10-30 | End: 2024-10-31

## 2024-10-30 RX ORDER — INSULIN GLARGINE 100 [IU]/ML
7 INJECTION, SOLUTION SUBCUTANEOUS ONCE
Status: CANCELLED | OUTPATIENT
Start: 2024-10-30

## 2024-10-30 RX ORDER — INSULIN GLARGINE 100 [IU]/ML
32 INJECTION, SOLUTION SUBCUTANEOUS DAILY
Status: DISCONTINUED | OUTPATIENT
Start: 2024-10-31 | End: 2024-10-31

## 2024-10-30 RX ORDER — INSULIN ASPART 100 [IU]/ML
14 INJECTION, SOLUTION INTRAVENOUS; SUBCUTANEOUS
Status: DISCONTINUED | OUTPATIENT
Start: 2024-10-30 | End: 2024-10-31

## 2024-10-30 RX ADMIN — HEPARIN SODIUM 5000 UNITS: 5000 INJECTION INTRAVENOUS; SUBCUTANEOUS at 09:10

## 2024-10-30 RX ADMIN — INSULIN ASPART 14 UNITS: 100 INJECTION, SOLUTION INTRAVENOUS; SUBCUTANEOUS at 05:10

## 2024-10-30 RX ADMIN — INSULIN ASPART 12 UNITS: 100 INJECTION, SOLUTION INTRAVENOUS; SUBCUTANEOUS at 12:10

## 2024-10-30 RX ADMIN — INSULIN ASPART 2 UNITS: 100 INJECTION, SOLUTION INTRAVENOUS; SUBCUTANEOUS at 12:10

## 2024-10-30 RX ADMIN — SILODOSIN 4 MG: 4 CAPSULE ORAL at 08:10

## 2024-10-30 RX ADMIN — INSULIN ASPART 4 UNITS: 100 INJECTION, SOLUTION INTRAVENOUS; SUBCUTANEOUS at 05:10

## 2024-10-30 RX ADMIN — LACOSAMIDE 200 MG: 50 TABLET, FILM COATED ORAL at 09:10

## 2024-10-30 RX ADMIN — HEPARIN SODIUM 5000 UNITS: 5000 INJECTION INTRAVENOUS; SUBCUTANEOUS at 05:10

## 2024-10-30 RX ADMIN — FAMOTIDINE 20 MG: 20 TABLET ORAL at 08:10

## 2024-10-30 RX ADMIN — ATORVASTATIN CALCIUM 40 MG: 40 TABLET, FILM COATED ORAL at 08:10

## 2024-10-30 RX ADMIN — HEPARIN SODIUM 5000 UNITS: 5000 INJECTION INTRAVENOUS; SUBCUTANEOUS at 02:10

## 2024-10-30 RX ADMIN — INSULIN ASPART 14 UNITS: 100 INJECTION, SOLUTION INTRAVENOUS; SUBCUTANEOUS at 12:10

## 2024-10-30 RX ADMIN — LEVETIRACETAM 1500 MG: 100 INJECTION INTRAVENOUS at 08:10

## 2024-10-30 RX ADMIN — INSULIN GLARGINE 28 UNITS: 100 INJECTION, SOLUTION SUBCUTANEOUS at 08:10

## 2024-10-30 RX ADMIN — LACOSAMIDE 200 MG: 10 INJECTION INTRAVENOUS at 09:10

## 2024-10-30 RX ADMIN — LEVETIRACETAM 1500 MG: 500 SOLUTION ORAL at 09:10

## 2024-10-30 RX ADMIN — LABETALOL HYDROCHLORIDE 10 MG: 5 INJECTION, SOLUTION INTRAVENOUS at 08:10

## 2024-10-30 RX ADMIN — PIPERACILLIN SODIUM AND TAZOBACTAM SODIUM 4.5 G: 4; .5 INJECTION, POWDER, FOR SOLUTION INTRAVENOUS at 02:10

## 2024-10-30 RX ADMIN — INSULIN ASPART 2 UNITS: 100 INJECTION, SOLUTION INTRAVENOUS; SUBCUTANEOUS at 08:10

## 2024-10-30 RX ADMIN — INSULIN ASPART 14 UNITS: 100 INJECTION, SOLUTION INTRAVENOUS; SUBCUTANEOUS at 07:10

## 2024-10-30 RX ADMIN — INSULIN ASPART 12 UNITS: 100 INJECTION, SOLUTION INTRAVENOUS; SUBCUTANEOUS at 05:10

## 2024-10-30 RX ADMIN — INSULIN ASPART 12 UNITS: 100 INJECTION, SOLUTION INTRAVENOUS; SUBCUTANEOUS at 08:10

## 2024-10-30 NOTE — ASSESSMENT & PLAN NOTE
EEG placed morning of 10/21 due to delay in return to expected LOC  -Multiple seizures throughout the day  -Has been loaded with keppra and vimpat  -Addl seizure evening of 10/24, Onfi added on.   -Current AED regimen:   - Keppra 1500 BID   - Vimpat 200 BID  -No seizures 10/30

## 2024-10-30 NOTE — PROGRESS NOTES
"[facility] - Neuro Critical Care  Adult Nutrition  Progress Note    SUMMARY     Recommendation/Intervention:   1) TF- Increase to Diabetisource @ 60 mL/hr due to Propofol turned off, will provide 1728 kcal, 86 g protein, 1178 mL H2O per day   - FWF per MD  2) RD to monitor and follow-up as needed    Goals: Meet % EEN/EPN by RD follow up.  Nutrition Goal Status: progressing towards goal  Communication of RD Recs: other (comment) (POC)    Assessment and Plan    Nutrition Problem  Inadequate oral intake     Related to (etiology):   Inability to consume sufficient nutrients, mechanical ventilation     Signs and Symptoms (as evidenced by):   NPO status     Interventions/Recommendations (treatment strategy):  Collab of nutrition care w/ other providers   Enteral nutrition     Nutrition Diagnosis Status:   Continues    Reason for Assessment    Reason For Assessment: RD follow-up  Diagnosis: other (see comments) (Traumatic right-sided intracerebral hemorrhage without loss of consciousness)  General Information Comments: Pt followed by RD team. Remains NPO on vent. TF infusing at recommended rate. Propofol now turned off and not providing additional kcal.  Nutrition Discharge Planning: Pending clinical course    Nutrition Risk Screen    Nutrition Risk Screen: tube feeding or parenteral nutrition    Nutrition/Diet History    Typical Food/Fluid Intake: Breakfast: yogurt, bananas; Lunch: salad, sandwich; Dinner: katlin steak, mashed potatoes  Spiritual, Cultural Beliefs, Baptist Practices, Values that Affect Care: no  Vitamin/Mineral/Herbal Supplements: Vit D  Food Allergies: NKFA  Factors Affecting Nutritional Intake: NPO    Anthropometrics    Temp: 98.5 °F (36.9 °C)  Height: 5' 8" (172.7 cm)  Height (inches): 68 in  Weight Method: Bed Scale  Weight: 83.5 kg (184 lb)  Weight (lb): 184 lb  Ideal Body Weight (IBW), Male: 154 lb  % Ideal Body Weight, Male (lb): 119.48 %  BMI (Calculated): 28  BMI Grade: 25 - 29.9 - " overweight  Usual Body Weight (UBW), k.82 kg  % Usual Body Weight: 102.22       Lab/Procedures/Meds    Pertinent Labs Reviewed: reviewed  Pertinent Labs Comments: Hgb: 7.1, Hct: 24, BUN: 41, Creatinine: 1.6, eGFR: 42.8, Glucose: 173, Magnesium: 2.7  Pertinent Medications Reviewed: reviewed   atorvastatin  40 mg Per OG tube Daily    cloBAZam  10 mg Per OG tube QHS    famotidine  20 mg Per OG tube Daily    heparin (porcine)  5,000 Units Subcutaneous Q8H    insulin aspart U-100  14 Units Subcutaneous 6 times per day    [START ON 10/31/2024] insulin glargine U-100  32 Units Subcutaneous Daily    lacosamide (Vimpat) IV orderable  200 mg Intravenous Q12H    levETIRAcetam (Keppra) IV (PEDS and ADULTS)  1,500 mg Intravenous Q12H    silodosin  4 mg Per OG tube Daily     Estimated/Assessed Needs    Weight Used For Calorie Calculations: 83.5 kg (184 lb 1.4 oz)  Energy Calorie Requirements (kcal): 1720 kcal/day  Energy Need Method: [facility]  Protein Requirements:  (1.0-1.2 g/kg ABW)  Weight Used For Protein Calculations: 83.5 kg (184 lb 1.4 oz)  Fluid Requirements (mL): Per MD     RDA Method (mL): 1720  CHO Requirement: 237    Nutrition Prescription Ordered    Current Diet Order: NPO  Current Nutrition Support Formula Ordered: Diabetisource AC  Current Nutrition Support Rate Ordered: 50 (ml)  Current Nutrition Support Frequency Ordered: mL/hr    Evaluation of Received Nutrient/Fluid Intake    Enteral Calories (kcal): 1440  Enteral Protein (gm): 72  Enteral (Free Water) Fluid (mL): 982  % Kcal Needs: 84  % Protein Needs: 86%  I/O: + 11.6 L since admit  Energy Calories Required: not meeting needs  Protein Required: not meeting needs  Fluid Required: other (see comments) (As per MD)  Comments: LBM 10/29  Tolerance: tolerating  % Intake of Estimated Energy Needs: 75 - 100 %  % Meal Intake: NPO    Nutrition Risk    Level of Risk/Frequency of Follow-up:  (1x/week)     Monitor and Evaluation    Food and Nutrient Intake:  energy intake, enteral nutrition intake  Food and Nutrient Adminstration: enteral and parenteral nutrition administration  Knowledge/Beliefs/Attitudes: food and nutrition knowledge/skill  Physical Activity and Function: nutrition-related ADLs and IADLs  Anthropometric Measurements: weight, weight change, body mass index  Biochemical Data, Medical Tests and Procedures: lipid profile, inflammatory profile, glucose/endocrine profile, gastrointestinal profile, electrolyte and renal panel  Nutrition-Focused Physical Findings: overall appearance     Nutrition Follow-Up    RD Follow-up?: Yes

## 2024-10-30 NOTE — ASSESSMENT & PLAN NOTE
83M PMHx of HTN, HLD, DM2, CAD s/p CABG x2, Afib on Coumadin, ILD on 4L of O2, HFrEF (45%), presenting as a transfer for a large traumatic R frontal IPH reversed with Kcentra and vitamin K with repeat INR 1.2.    Admit to NCC  q1h neuro checks, vitals, and I&O's  CBC, CMP, mag, and phos daily   Coags, TSH, A1c, lipid panel, UA  Echo, EKG, CXR  SBP <160  Na goals > 135  NSGY signed off  NPO   SCDs; heparin for VTE prophylaxis  PT/OT/SLP when extubated  Conversation had with family 10/30 regarding patient's status - given that his seizures have stopped and we are still weaning AEDs, will pursue trach and PEG to allow for additional time to recover neurologically   Circumcision

## 2024-10-30 NOTE — ASSESSMENT & PLAN NOTE
Initial presentation c/f HHS w glucose > 600. Beta hydroxybutyrate and urine ketones negative. Serum CO2 22. Given SQ insulin at OSH.  On arrival to [facility], . Hb A1c 9.9% on admit.     Patient's FSGs are uncontrolled due to hyperglycemia on current medication regimen.  Last A1c reviewed-   Lab Results   Component Value Date    LABA1C 6.8 10/09/2017    HGBA1C 9.9 (H) 10/17/2024     Most recent fingerstick glucose reviewed-   Recent Labs   Lab 10/30/24  0542 10/30/24  0815 10/30/24  1212 10/30/24  1718   POCTGLUCOSE 212* 185* 158* 149*     Current correctional scale  Medium  Increase anti-hyperglycemic dose as follows-   Antihyperglycemics (From admission, onward)      Start     Stop Route Frequency Ordered    10/31/24 0900  insulin glargine U-100 (Lantus) pen 32 Units         -- SubQ Daily 10/30/24 0856    10/30/24 1200  insulin aspart U-100 pen 14 Units         -- SubQ 6 times per day 10/30/24 0856    10/25/24 1133  insulin aspart U-100 pen 0-10 Units         -- SubQ Every 4 hours PRN 10/25/24 1036    10/22/24 1400  insulin regular in 0.9 % NaCl 100 unit/100 mL (1 unit/mL) infusion        Question Answer Comment   Insulin Rate Adjustment (DO NOT MODIFY ANSWER) [email or web address]    Enter initial dose from Infusion Protocol Chart (Units/hr): 1.5        10/25/24 1431 IV Continuous 10/22/24 1252    10/21/24 1145  insulin regular in 0.9 % NaCl 100 unit/100 mL (1 unit/mL) infusion        Question:  Enter initial dose from Infusion Protocol Chart (Units/hr):  Answer:  1.65    10/21/24 1343 IV Continuous 10/21/24 1042          Hold Oral hypoglycemics while patient is in the hospital.

## 2024-10-30 NOTE — ASSESSMENT & PLAN NOTE
83 yo M with CHF, Afib on coumadin, and DM here after a traumatic ICH. He has had a prolonged course of mechanical ventilation and his mental status does not allow for trials of extubation. He will also need enteral access.    -To the OR tomorrow for trach/PEG  -Will call family for consent  -Please hold tube feeds at midnight. Order placed  -OK for DVT ppx.

## 2024-10-30 NOTE — NURSING
Pt arrived back to room following CT        Pt was escorted by RN and RT on cardiac monitoring, O2, and portable vent.        Patient placed back on bedside monitor with alarms audible, bed in low position with bed alarm on, call light within reach. ANGELIA.

## 2024-10-30 NOTE — ASSESSMENT & PLAN NOTE
Elevated WBC 13.5 today. Procal elevated at 0.82.  - Repeat procal at .74  - receiving zosyn, vanc discontinued  - Respiratory cx growing Candida lusitaniae     - ID consulted, candida likely respiratory colonizer and not needing treatment in immunocompetent patient  - No source of infection at this time - antibiotics discontinued

## 2024-10-30 NOTE — ASSESSMENT & PLAN NOTE
AUA symptom score is 6.  He is satisfied his voiding pattern.  PSA last year was normal at 0.4.  Will plan to recheck PSA at this time.  We will continue to follow voiding pattern of watchful waiting.   See primary problem

## 2024-10-30 NOTE — SUBJECTIVE & OBJECTIVE
Interval History:      NAEON. No additional seizures, clobazam can be discontinued. Less concern for PNA at this time, abx discontinued. Family all together for discussion about patient's status - will pursue trach/PEG tomorrow.    Objective:     Vitals:  Temp: 98.3 °F (36.8 °C)  Pulse: 96  Rhythm: atrial rhythm  BP: (!) 159/71  MAP (mmHg): 102  Resp: (!) 21  SpO2: 97 %  Oxygen Concentration (%): 40  Vent Mode: Spont  Set Rate: 16 BPM  Vt Set: 500 mL  Pressure Support: 7 cmH20  PEEP/CPAP: 5 cmH20  Peak Airway Pressure: 13 cmH20  Mean Airway Pressure: 7.9 cmH20  Plateau Pressure: 13 cmH20    Temp  Min: 97.2 °F (36.2 °C)  Max: 99.4 °F (37.4 °C)  Pulse  Min: 69  Max: 107  BP  Min: 114/57  Max: 166/83  MAP (mmHg)  Min: 80  Max: 117  Resp  Min: 10  Max: 24  SpO2  Min: 97 %  Max: 100 %  Oxygen Concentration (%)  Min: 40  Max: 40    10/29 0701 - 10/30 0700  In: 1700.6   Out: 1675 [Urine:1675]   Unmeasured Output  Urine Occurrence: 1  Stool Occurrence: 1  Pad Count: 1        Physical Exam  Constitutional:       Appearance: He is not toxic-appearing.      Interventions: He is intubated.   Cardiovascular:      Rate and Rhythm: Normal rate and regular rhythm.   Pulmonary:      Effort: Pulmonary effort is normal. No respiratory distress. He is intubated.      Breath sounds: Normal breath sounds.   Abdominal:      General: There is no distension.      Tenderness: There is no abdominal tenderness.   Musculoskeletal:         General: Swelling present. No deformity.   Skin:     General: Skin is warm and dry.   Neurological:      Comments: Some withdrawal/triple flexion to lower extremities; unable to complete neuro exam due to not being arousable at this time              Medications:  [id number]    Scheduledatorvastatin, 40 mg, Daily  famotidine, 20 mg, Daily  heparin (porcine), 5,000 Units, Q8H  insulin aspart U-100, 14 Units, 6 times per day  [START ON 10/31/2024] insulin glargine U-100, 32 Units, Daily  lacosamide, 200 mg,  Q12H  levetiracetam, 1,500 mg, BID  silodosin, 4 mg, Daily    PRNacetaminophen, 650 mg, Q6H PRN  D10W, , Continuous PRN  dextrose 10%, 12.5 g, PRN  dextrose 10%, 25 g, PRN  fentaNYL, 50 mcg, Q1H PRN  glucagon (human recombinant), 1 mg, PRN  hydrALAZINE, 10 mg, Q4H PRN  insulin aspart U-100, 0-10 Units, Q4H PRN  labetalol, 10 mg, Q4H PRN  magnesium oxide, 800 mg, PRN  magnesium oxide, 800 mg, PRN  ondansetron, 4 mg, Q8H PRN  potassium bicarbonate, 35 mEq, PRN  potassium bicarbonate, 50 mEq, PRN  potassium bicarbonate, 60 mEq, PRN  potassium, sodium phosphates, 2 packet, PRN  potassium, sodium phosphates, 2 packet, PRN  potassium, sodium phosphates, 2 packet, PRN      Today I personally reviewed pertinent medications, lines/drains/airways, laboratory results,     Diet  Diet NPO Except for: Medication  Diet NPO Except for: Medication

## 2024-10-30 NOTE — ASSESSMENT & PLAN NOTE
2/2 R IPH s/p R supraorbital craniotomy for evacuation 10/18 and R craniotomy for SDH evacuation on 10/19  84M PMHx of HTN, HLD, DM2, CAD s/p CABG x2, Afib on Coumadin, ILD on home O2, [place] [facility] [place] For evaluation and management of large traumatic R frontal IPH now s/p R supraorbital craniotomy for hematoma evacuation 10/18 and R craniotomy for evacuation of post-op subdural hematoma 10/19. EEG initiated 10/21 showing right hemispheric subclinical seizures.    Recommendations:  - Continuous vEEG monitoring  - Recommend to discontinue Clobazam 10/30  - Recommend to continue Lacosamide 200 mg BID and Levetiracetam 1500 mg BID  - Propofol weaned off 10/28  - Avoid agents that lower seizure threshold  - Management of infectious/metabolic abnormalities per NCC  - Seizure precautions      Discussed plan of care with NCC team, no family at bedside. Will follow, please call with questions.

## 2024-10-30 NOTE — PLAN OF CARE
"[facility] Care Plan  POC reviewed with Percy Ramirez and family at 1500. Family verbalized understanding. Questions and concerns addressed. No acute events today. Pt progressing toward goals. Will continue to monitor. See below and flowsheets for full assessment and VS info.     - Tube Feeds increased to 60 mL/hr (d/c at midnight for surgery tomorrow)  - PRN Labetalol x1  - Plan for Trach/Peg placement tomorrow  - CT complete  - BM x1  - Longoria in place    Is this a stroke patient? yes- Stroke booklet reviewed with patient, risk factors identified for patient and stroke booklet remains at bedside for ongoing education.     Care individualization: frequent pulmonary toileting    Neuro:  [place] Coma Scale  Best Eye Response: 1-->(E1) none  Best Motor Response: 3-->(M3) flexion to pain  Best Verbal Response: 1-->(V1) none  [place] Coma Scale Score: 5  Assessment Qualifiers: patient intubated  Pupil PERRLA: yes     24 hr Temp:  [97.2 °F (36.2 °C)-99.4 °F (37.4 °C)]     CV:   Rhythm: atrial rhythm  BP goals:   SBP < 160  MAP > 65    Resp:      Vent Mode: Spont  Set Rate: 16 BPM  Oxygen Concentration (%): 40  Vt Set: 500 mL  PEEP/CPAP: 5 cmH20  Pressure Support: 7 cmH20    Plan: trach/PEG discussions    GI/:     Diet/Nutrition Received: tube feeding  Last Bowel Movement: 10/29/24  Voiding Characteristics: urethral catheter (bladder)    Intake/Output Summary (Last 24 hours) at 10/30/2024 1455  Last data filed at 10/30/2024 1402  Gross per 24 hour   Intake 1330 ml   Output 1625 ml   Net -295 ml     Unmeasured Output  Urine Occurrence: 1  Stool Occurrence: 1  Pad Count: 1    Labs/Accuchecks:  Recent Labs   Lab 10/30/24  0541   WBC 12.51   RBC 2.53*   HGB 7.1*   HCT 24.0*         Recent Labs   Lab 10/30/24  0229      K 4.6   CO2 25      BUN 41*   CREATININE 1.6*   ALKPHOS 105   ALT 16   AST 32   BILITOT 0.3    No results for input(s): "PROTIME", "INR", "APTT", "HEPANTIXA" in the last 168 hours. No results " "for input(s): "CPK", "CPKMB", "TROPONINI", "MB" in the last 168 hours.    Electrolytes: N/A - electrolytes WDL  Accuchecks: Q4H    Gtts:   D10W   Intravenous Continuous PRN           LDA/Wounds:    Mykel Risk Assessment  Sensory Perception: 2-->very limited  Moisture: 3-->occasionally moist  Activity: 1-->bedfast  Mobility: 1-->completely immobile  Nutrition: 3-->adequate  Friction and Shear: 2-->potential problem  Mykel Score: 12    Is your mykel score 12 or less? yes heel boots on      Nurses Note -- 4 Eyes    Is there altered skin present?  No  Second RN/Staff Member:  RADHA Mckinney        WC   "

## 2024-10-30 NOTE — HPI
83 yo M with CHF, Afib on coumadin, and DM here after a traumatic ICH. He has had significant seizure activity and has remained intubated due to his mental status. A goals of care meeting was held with his daughters who wish to pursue tracheostomy and enteral access creation.

## 2024-10-30 NOTE — ASSESSMENT & PLAN NOTE
LEANDRO on CKD, Cr 1.8 on initial presentation to ED    Recent Labs     10/28/24  0029 10/29/24  0258 10/30/24  0229   CREATININE 1.4 1.6* 1.6*   EGFRNORACEVR 50.2* 42.8* 42.8*        Plan  - LEANDRO is resolved - Cr increasing again, possibly due to vanc?  - Avoid nephrotoxins and renally dose meds for GFR listed above  - Monitor urine output, serial BMP, and adjust therapy as needed  - Q1H I&Os

## 2024-10-30 NOTE — SUBJECTIVE & OBJECTIVE
Interval History: EEG stable overnight on decreased Clobazam, plan to discontinue today.     Current Facility-Administered Medications   Medication Dose Route Frequency Provider Last Rate Last Admin    acetaminophen tablet 650 mg  650 mg Per OG tube Q6H PRN Saman Gonzalez MD   650 mg at 10/28/24 1828    atorvastatin tablet 40 mg  40 mg Per OG tube Daily Christina Em NP   40 mg at 10/30/24 0820    cloBAZam Tab 10 mg  10 mg Per OG tube QHS Dolores Moore DO   10 mg at 10/29/24 2013    dextrose 10 % infusion   Intravenous Continuous PRN Dolores Moore DO        dextrose 10% bolus 125 mL 125 mL  12.5 g Intravenous PRN Dolores Moore DO        dextrose 10% bolus 250 mL 250 mL  25 g Intravenous PRN Dolores Moore DO        famotidine tablet 20 mg  20 mg Per OG tube Daily Saman Gonzalez MD   20 mg at 10/30/24 0820    fentaNYL injection 50 mcg  50 mcg Intravenous Q1H PRN Christina Hess NP   50 mcg at 10/27/24 1338    glucagon (human recombinant) injection 1 mg  1 mg Intramuscular PRN Dolores Moore DO        heparin (porcine) injection 5,000 Units  5,000 Units Subcutaneous Q8H Khrsi Cote MD   5,000 Units at 10/30/24 1429    hydrALAZINE injection 10 mg  10 mg Intravenous Q4H PRN Dolores Moore DO   10 mg at 10/26/24 1832    insulin aspart U-100 pen 0-10 Units  0-10 Units Subcutaneous Q4H PRN Dolores Moore DO   2 Units at 10/30/24 1213    insulin aspart U-100 pen 14 Units  14 Units Subcutaneous 6 times per day Dolores Moore DO   14 Units at 10/30/24 1212    [START ON 10/31/2024] insulin glargine U-100 (Lantus) pen 32 Units  32 Units Subcutaneous Daily Dolores Moore DO        labetalol 20 mg/4 mL (5 mg/mL) IV syring  10 mg Intravenous Q4H PRN Dolores Moore DO   10 mg at 10/30/24 0820    lacosamide tablet 200 mg  200 mg Per OG tube Q12H Zheng Sykes MD        levetiracetam 500 mg/5 mL (5 mL) liquid Soln 1,500 mg  1,500 mg Per OG tube BID Zheng Sykes MD        magnesium oxide split tablet 800  mg  800 mg Per OG tube PRN Radha Yan PA-C        magnesium oxide split tablet 800 mg  800 mg Per OG tube PRN Radha Yan PA-WHIT        ondansetron injection 4 mg  4 mg Intravenous Q8H PRN RodrigokalAngel PA-C        potassium bicarbonate disintegrating tablet 35 mEq  35 mEq Per OG tube PRN Radha Yan, PA-WHIT        potassium bicarbonate disintegrating tablet 50 mEq  50 mEq Per OG tube PRN Radha Yan PA-WHIT        potassium bicarbonate disintegrating tablet 60 mEq  60 mEq Per OG tube PRN Radha Yan PA-C        potassium, sodium phosphates 280-160-250 mg packet 2 packet  2 packet Per OG tube PRN Radha Yan, PA-WHIT        potassium, sodium phosphates 280-160-250 mg packet 2 packet  2 packet Per OG tube PRN Radha Yan PA-WHIT   2 packet at 10/23/24 1220    potassium, sodium phosphates 280-160-250 mg packet 2 packet  2 packet Per OG tube PRN Radha Yan PA-C        silodosin capsule 4 mg  4 mg Per OG tube Daily Radha Yan PA-WHIT   4 mg at 10/30/24 0820     Continuous Infusions:   D10W   Intravenous Continuous PRN           Review of Systems  Objective:     Vital Signs (Most Recent):  Temp: 98.3 °F (36.8 °C) (10/30/24 1502)  Pulse: 100 (10/30/24 1502)  Resp: 17 (10/30/24 1502)  BP: (!) 159/90 (10/30/24 1502)  SpO2: 100 % (10/30/24 1502) Vital Signs (24h Range):  Temp:  [97.2 °F (36.2 °C)-99.4 °F (37.4 °C)] 98.3 °F (36.8 °C)  Pulse:  [] 100  Resp:  [10-23] 17  SpO2:  [97 %-100 %] 100 %  BP: (114-166)/(57-90) 159/90     Weight: 83.5 kg (184 lb)  Body mass index is 27.98 kg/m².     Physical Exam  Constitutional:       General: He is not in acute distress.     Appearance: He is not diaphoretic.      Interventions: He is intubated.   HENT:      Head: Normocephalic.      Comments: EEG in place  Eyes:      General: No scleral icterus.        Right eye: No discharge.         Left eye: No discharge.      Conjunctiva/sclera: Conjunctivae normal.      Pupils: Pupils are  equal, round, and reactive to light.   Cardiovascular:      Rate and Rhythm: Normal rate.   Pulmonary:      Effort: Pulmonary effort is normal. No respiratory distress. He is intubated.      Comments: Intubated  Skin:     General: Skin is warm and dry.   Psychiatric:      Comments: Unable to assess            NEUROLOGICAL EXAMINATION:     CRANIAL NERVES     CN III, IV, VI   Pupils are equal, round, and reactive to light.       Comatose   AQUILINO OU   Corneals intact  No spontaneous eye opening   Face symmetric   Tongue midline   Minimal triple flexion to BLE    Exam findings to suggest seizures:  Myoclonus - no   eye twitching - no   Nystagmus - no   gaze deviation - no   waxy rigidity - no        Significant Labs: All pertinent lab results from the past 24 hours have been reviewed.    Significant Studies: I have reviewed all pertinent imaging results/findings within the past 24 hours.

## 2024-10-30 NOTE — PROCEDURES
ICU EEG/VIDEO MONITORING REPORT    DATE OF SERVICE: 10/28/24-10/29/24  EEG NUMBER: [id number]  [facility]    METHODOLOGY   Electroencephalographic (EEG) recording is with electrodes placed according to the International 10-20 placement system.  Thirty two (32) channels of digital signal are simultaneously recorded from the scalp and may include EKG, EMG, and/or eye monitors.   Recording band pass was 0.1 to 512 hz.  Digital video recording of the patient is simultaneously recorded with the EEG.  The nursing staff report clinical symptoms and may press an event button when the patient has symptoms of clinical interest to the treating physicians.  EEG and video recording is stored and archived in digital format.  The entire recording is visually reviewed and the times identified by computer analysis as being spikes or seizures are reviewed again.  Activation procedures which include photic stimulation, hyperventilation and instructing patients to perform simple task are done in selected patients.   Compresses spectral analysis (CSA) is also performed on the activity recorded from each individual channel.  This is displayed as a power display of frequencies from 0 to 30 Hz over time.   The CSA analysis is done and displayed continuously.  This is reviewed for asymmetries in power between homologous areas of the scalp and for presence of changes in power which canbe seen when seizures occur.  Sections of suspected abnormalities on the CSA is then compared with the original EEG recording.     VenJuvo software was also utilized in the review of this study.  This software suite analyzes the EEG recording in multiple domains.  Coherence and rhythmicity is computed to identify EEG sections which may contain organized seizures.  Each channel undergoes analysis to detect presence of spike and sharp waves which have special and morphological characteristic of epileptic activity.  The routine EEG recording  is converted from spacial into frequency domain.  This is then displayed comparing homologous areas to identify areas of significant asymmetry.  Algorithm to identify non-cortically generated artifact is used to separate eye movement, EMG and other artifact from the EEG.      Recording Times  Start on 10/29/24 at 07:00  Stop on 10/30/24 at 07:00  24 hours EEG recorded    This EEG study is performed in the ICU with the patient on the following sedative medications: none    Indication: 83 y.o. male with a past medical history of CABG x2 on Coumadin, HTN, T2DM, HLD who was transferred from [facility] for management of large traumatic right frontal ICH.     State of Consciousness:   Coma    Background:   The background is discontinuous with brief (up to 1-2 seconds) of suppression alternating with low amplitude theta/delta activity.  The left hemisphere is more suppressed than the right side.        Sleep:   The patient is in a comatose state throughout the record, with no normal sleep architecture appreciated    Epileptiform Abnormalities  Poorly formed low amplitude sharply contoured delta activity is sometimes seen in the right hemisphere     EKG:   Irregular rhythm    Seizures/Events:   None    Impression:   Abnormal study demonstrating a discontinuous record with diffuse slowing and brief periods of suppression consistent with a severe diffuse encephalopathy.  No seizures are recorded.       Mayra Trammell MD  [facility]   Department of Neurology/Epilepsy

## 2024-10-30 NOTE — PROGRESS NOTES
[facility] Neuro Critical Care  Neurology-Epilepsy  Progress Note    Patient Name: Percy Ramirez  MRN: [id number]  Admission Date: 10/17/2024  Hospital Length of Stay: 13 days  Code Status: Full Code   Attending Provider: Zheng Sykes MD  Primary Care Physician: Kasie Edmondson MD   Principal Problem:Traumatic right-sided intracerebral hemorrhage without loss of consciousness    Subjective:     Hospital Course:   10/21>10/22 EEG: Recurrent right hemispheric seizures with significant improvement noted in frequency after Propofol initiated 10/21 pm.  10/22>10/23 EEG: Highly epileptogenic right hemispheric structural lesion and severe diffuse encephalopathy, 11 subclinical seizures arising from the right parietal/temporal region   10/23>10/24 EEG: No further seizures after escalation of Propofol on 10/23, highly epileptogenic right hemispheric structural lesion and a severe diffuse encephalopathy  10/24>10/25 EEG: Highly epileptogenic right hemispheric structural lesion and a severe diffuse encephalopathy, one subclinical seizure at 19:14 on 10/24  OFF FOR MRI  10/26>10/27 EE electrographic right onset seizure after Propofol discontinued, burst suppression pattern with epileptiform bursts  10/27>10/28 EEG: suppressed, no electrographic seizures  10/28>10/29 EEG: remains suppressed, occasional right sharps, no electrographic seizures  10/29>10/30 EEG: discontinuous record with diffuse slowing and brief periods of suppression consistent with severe diffuse encephalopathy, no seizures    See EEG reports for details.    Interval History: EEG stable overnight on decreased Clobazam, plan to discontinue today.     Current Facility-Administered Medications   Medication Dose Route Frequency Provider Last Rate Last Admin    acetaminophen tablet 650 mg  650 mg Per OG tube Q6H PRN Saman Gonzalez MD   650 mg at 10/28/24 1828    atorvastatin tablet 40 mg  40 mg Per OG tube Daily Christina Em, MICHAEL   40 mg at  10/30/24 0820    cloBAZam Tab 10 mg  10 mg Per OG tube QHS Dolores Moore, DO   10 mg at 10/29/24 2013    dextrose 10 % infusion   Intravenous Continuous PRN Dolores Moore DO        dextrose 10% bolus 125 mL 125 mL  12.5 g Intravenous PRN Dolores Moore DO        dextrose 10% bolus 250 mL 250 mL  25 g Intravenous PRN Dolores Moore DO        famotidine tablet 20 mg  20 mg Per OG tube Daily Saman Gonzalez MD   20 mg at 10/30/24 0820    fentaNYL injection 50 mcg  50 mcg Intravenous Q1H PRN Christina Hess NP   50 mcg at 10/27/24 1338    glucagon (human recombinant) injection 1 mg  1 mg Intramuscular PRN Dolores Moore DO        heparin (porcine) injection 5,000 Units  5,000 Units Subcutaneous Q8H Khris Cote MD   5,000 Units at 10/30/24 1429    hydrALAZINE injection 10 mg  10 mg Intravenous Q4H PRN Dolores Moore DO   10 mg at 10/26/24 1832    insulin aspart U-100 pen 0-10 Units  0-10 Units Subcutaneous Q4H PRN Dolores Moore DO   2 Units at 10/30/24 1213    insulin aspart U-100 pen 14 Units  14 Units Subcutaneous 6 times per day Dolores Moore DO   14 Units at 10/30/24 1212    [START ON 10/31/2024] insulin glargine U-100 (Lantus) pen 32 Units  32 Units Subcutaneous Daily Dolores Moore DO        labetalol 20 mg/4 mL (5 mg/mL) IV syring  10 mg Intravenous Q4H PRN Dolores Moore DO   10 mg at 10/30/24 0820    lacosamide tablet 200 mg  200 mg Per OG tube Q12H Zheng Sykes MD        levetiracetam 500 mg/5 mL (5 mL) liquid Soln 1,500 mg  1,500 mg Per OG tube BID Zheng Sykes MD        magnesium oxide split tablet 800 mg  800 mg Per OG tube PRN Radha Yan PA-C        magnesium oxide split tablet 800 mg  800 mg Per OG tube PRN Radha Yan PA-C        ondansetron injection 4 mg  4 mg Intravenous Q8H PRN Angel Nunez PA-C        potassium bicarbonate disintegrating tablet 35 mEq  35 mEq Per OG tube PRN Radha Yan PA-C        potassium bicarbonate disintegrating tablet 50 mEq  50  mEq Per OG tube PRN Radha Yan PA-C        potassium bicarbonate disintegrating tablet 60 mEq  60 mEq Per OG tube PRN Radha Yan PA-C        potassium, sodium phosphates 280-160-250 mg packet 2 packet  2 packet Per OG tube PRN Radha Yan PA-C        potassium, sodium phosphates 280-160-250 mg packet 2 packet  2 packet Per OG tube PRN Radha Yan PA-C   2 packet at 10/23/24 1220    potassium, sodium phosphates 280-160-250 mg packet 2 packet  2 packet Per OG tube PRN Radha Yan PA-C        silodosin capsule 4 mg  4 mg Per OG tube Daily Radha Yan PA-C   4 mg at 10/30/24 0820     Continuous Infusions:   D10W   Intravenous Continuous PRN           Review of Systems  Objective:     Vital Signs (Most Recent):  Temp: 98.3 °F (36.8 °C) (10/30/24 1502)  Pulse: 100 (10/30/24 1502)  Resp: 17 (10/30/24 1502)  BP: (!) 159/90 (10/30/24 1502)  SpO2: 100 % (10/30/24 1502) Vital Signs (24h Range):  Temp:  [97.2 °F (36.2 °C)-99.4 °F (37.4 °C)] 98.3 °F (36.8 °C)  Pulse:  [] 100  Resp:  [10-23] 17  SpO2:  [97 %-100 %] 100 %  BP: (114-166)/(57-90) 159/90     Weight: 83.5 kg (184 lb)  Body mass index is 27.98 kg/m².     Physical Exam  Constitutional:       General: He is not in acute distress.     Appearance: He is not diaphoretic.      Interventions: He is intubated.   HENT:      Head: Normocephalic.      Comments: EEG in place  Eyes:      General: No scleral icterus.        Right eye: No discharge.         Left eye: No discharge.      Conjunctiva/sclera: Conjunctivae normal.      Pupils: Pupils are equal, round, and reactive to light.   Cardiovascular:      Rate and Rhythm: Normal rate.   Pulmonary:      Effort: Pulmonary effort is normal. No respiratory distress. He is intubated.      Comments: Intubated  Skin:     General: Skin is warm and dry.   Psychiatric:      Comments: Unable to assess            NEUROLOGICAL EXAMINATION:     CRANIAL NERVES     CN III, IV, VI   Pupils are  equal, round, and reactive to light.       Comatose   AQUILINO OU   Corneals intact  No spontaneous eye opening   Face symmetric   Tongue midline   Minimal triple flexion to BLE    Exam findings to suggest seizures:  Myoclonus - no   eye twitching - no   Nystagmus - no   gaze deviation - no   waxy rigidity - no        Significant Labs: All pertinent lab results from the past 24 hours have been reviewed.    Significant Studies: I have reviewed all pertinent imaging results/findings within the past 24 hours.  Assessment and Plan:     * Traumatic right-sided intracerebral hemorrhage without loss of consciousness  SDH  ICH  Transfer from OSH for evaluation and management of large traumatic R frontal IPH now s/p R supraorbital craniotomy for hematoma evacuation 10/18 and R craniotomy for evacuation of post-op subdural hematoma 10/19  - Drain removal 10/25 per NSGY  - Management per NSGY, NCC    Focal seizures  2/2 R IPH s/p R supraorbital craniotomy for evacuation 10/18 and R craniotomy for SDH evacuation on 10/19  84M PMHx of HTN, HLD, DM2, CAD s/p CABG x2, Afib on Coumadin, ILD on home O2, HFrEF admitted to [facility] as transfer from OSH For evaluation and management of large traumatic R frontal IPH now s/p R supraorbital craniotomy for hematoma evacuation 10/18 and R craniotomy for evacuation of post-op subdural hematoma 10/19. EEG initiated 10/21 showing right hemispheric subclinical seizures.    Recommendations:  - Continuous vEEG monitoring  - Recommend to discontinue Clobazam 10/30  - Recommend to continue Lacosamide 200 mg BID and Levetiracetam 1500 mg BID  - Propofol weaned off 10/28  - Avoid agents that lower seizure threshold  - Management of infectious/metabolic abnormalities per NCC  - Seizure precautions      Discussed plan of care with NCC team, no family at bedside. Will follow, please call with questions.    Chronic kidney disease, stage 3a  - Chronic and stable  - Management per NCC    ILD (interstitial lung  disease)  - Chronic, on home O2 as outpatient  - Currently intubated   - Vent management per [facility]    Chronic combined systolic and diastolic heart failure  - Management per [facility], Echo completed 10/18 showing EF 45-50%    Persistent atrial fibrillation  - On Warfarin as outpatient, on hold in setting of acute IPH  - Management per [facility]    Obstructive sleep apnea treated with BiPAP  - Management per [facility], currently holding BiPAP     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  - Glucose >600 on ER presentation  - Management per [facility], transitioned to scheduled insulin regimen    Benign hypertension with chronic kidney disease, stage III  - Vitals reviewed, management per [facility]  - Carvedilol 12.5 mg BID discontinued 10/23    Major depressive disorder, recurrent episode, mild  - Chronic  - SSRI held per [facility]  - Management per [facility]        VTE Risk Mitigation (From admission, onward)           Ordered     heparin (porcine) injection 5,000 Units  Every 8 hours         10/22/24 1121     IP VTE HIGH RISK PATIENT  Once         10/17/24 2326     Place sequential compression device  Until discontinued         10/17/24 2326     Reason for No Pharmacological VTE Prophylaxis  Once        Question:  Reasons:  Answer:  Active Bleeding    10/17/24 2326                    Radha Alfonso PA-C  Neurology-Epilepsy  [facility]  Staff: Dr. Trammell

## 2024-10-30 NOTE — PLAN OF CARE
SW uploaded pt living will in media.     Haleigh Perdomo MSW, FIONAW  [facility]  Case Management Dept.

## 2024-10-30 NOTE — PLAN OF CARE
Recommendation/Intervention:   1) TF- Increase to Diabetisource @ 60 mL/hr due to Propofol turned off, will provide 1728 kcal, 86 g protein, 1178 mL H2O per day              - FWF per MD  2) RD to monitor and follow-up as needed     Goals: Meet % EEN/EPN by RD follow up.  Nutrition Goal Status: progressing towards goal  Communication of RD Recs: other (comment) (POC)

## 2024-10-30 NOTE — PROGRESS NOTES
[facility] - Neuro Critical Care  Neurocritical Care  Progress Note    Admit Date: 10/17/2024  Service Date: 10/30/2024  Length of Stay: 13    Subjective:     Chief Complaint: Traumatic right-sided intracerebral hemorrhage without loss of consciousness    History of Present Illness: Percy Ramirez is a 83M PMHx of HTN, HLD, DM2, CAD s/p CABG x2, Afib on Coumadin, ILD on 4L of O2, HFrEF (45%), presenting as a transfer for a large traumatic R frontal IPH. Pt initially presented to [facility] ED yesterday ago after mechanical fall and was found to have 4mm parafalcine SDH. Repeat CTH was stable. Pt was discharged home on keppra and instructed to hold his blood thinners. On 10/17/24 pt was lethargic and fell. He was brought back to  ED. Blood glucose was >600 and he was febrile (101F). CTH revealed 5cm R frontal IPH with 7mm MLS. Kcentra, 10mg vitamin K, and 3% HTS bolus were given. He was transferred to [facility] for higher level of care.     Hospital Course: 10/19/2024: POD #1 from both his MI LS as well as his right subdural/intraparenchymal hemorrhage evacuation.  He is still intubated and although sedation is off, he is minimally responsive. Coreg doubled. NG tube placed and tube feeds started. Still on insulin drip.  10/20/2024: POD #2 s/p crani for SDH evacuation. Leukocytosis noted post-op, no accompanying fever/chills. Straight cath x1. Tachycardia responding to IVF boluses. Tube feeds started, will titrate to goal. Insulin gtt continued, plans to transition to subQ tomorrow.  10/21/2021: POD#3. EEG placed this morning, revealing multiple r-sided seizures. Keppra load completed and maintenance dose increased. Concern for LUE no longer WD, stat head CT showing new/increased hyperdensity in resection bed w/o worsening mass effect or MLS. 1L LR given for LEANDRO. Will continue to monitor Na.   10/22/24: POD#4. Addl seizures despite keppra load. Vimpat and propofol started yesterday with significant reduction in number.  1U PRBC transfused overnight. Required levo for short period of time after initiation of propofol.  10/23/24: POD#5.  Patient continues to be hyponatremic.  Free water volume increased.  Patient with total of 11 seizures yesterday.  Vimpat and propofol dosing increased with improvement.  10/24/24: POD#6. NGT adjusted overnight. No additional seizures on current AEM regimen. Platelets and Hgb decreasing, will order peripheral smear to assess for HIT  10/25/2024 POD#7. Addl seizure, Onfi added to AEM regimen. Drains removed by NSGY. Will attempt to wean propofol tomorrow.   10/26/2024 POD#8. MRI complete yx. Weaning propofol today.   10/27/2024 POD#9. Patient began having seizures again overnight following cessation of propofol gtt. Resumed propofol at 10cc/hr for now, increased Keppra dose, and plan to attempt to wean again tomorrow. Persistent hyperglycemia on tube feeds, optimizing insulin regimen. Leukocytosis pending respiratory cultures.  10/28/2024 POD10. No addl seizures overnight. Propofol turned off this morning. Clobazam dosing decreased as well. Resp cultures negative, but will obtain blood and urine cultures. Cont abx. NSGY signing off.   10/29/2024 POD11. No seizures. Clobazam down to 10mg QHS. Infiltrate developing RLL, sputum culture likely due to colonization of resp tract per ID.   10/30/2024 POD12. No seizures. Clobazam discontinued. Abx discontinued, as no significant sputum production and resp status is stable. Discussion with family today - trach/PEG likely tomorrow.     Interval History:      NAEON. No additional seizures, clobazam can be discontinued. Less concern for PNA at this time, abx discontinued. Family all together for discussion about patient's status - will pursue trach/PEG tomorrow.    Objective:     Vitals:  Temp: 98.3 °F (36.8 °C)  Pulse: 96  Rhythm: atrial rhythm  BP: (!) 159/71  MAP (mmHg): 102  Resp: (!) 21  SpO2: 97 %  Oxygen Concentration (%): 40  Vent Mode: Spont  Set Rate:  16 BPM  Vt Set: 500 mL  Pressure Support: 7 cmH20  PEEP/CPAP: 5 cmH20  Peak Airway Pressure: 13 cmH20  Mean Airway Pressure: 7.9 cmH20  Plateau Pressure: 13 cmH20    Temp  Min: 97.2 °F (36.2 °C)  Max: 99.4 °F (37.4 °C)  Pulse  Min: 69  Max: 107  BP  Min: 114/57  Max: 166/83  MAP (mmHg)  Min: 80  Max: 117  Resp  Min: 10  Max: 24  SpO2  Min: 97 %  Max: 100 %  Oxygen Concentration (%)  Min: 40  Max: 40    10/29 0701 - 10/30 0700  In: 1700.6   Out: 1675 [Urine:1675]   Unmeasured Output  Urine Occurrence: 1  Stool Occurrence: 1  Pad Count: 1        Physical Exam  Constitutional:       Appearance: He is not toxic-appearing.      Interventions: He is intubated.   Cardiovascular:      Rate and Rhythm: Normal rate and regular rhythm.   Pulmonary:      Effort: Pulmonary effort is normal. No respiratory distress. He is intubated.      Breath sounds: Normal breath sounds.   Abdominal:      General: There is no distension.      Tenderness: There is no abdominal tenderness.   Musculoskeletal:         General: Swelling present. No deformity.   Skin:     General: Skin is warm and dry.   Neurological:      Comments: Some withdrawal/triple flexion to lower extremities; unable to complete neuro exam due to not being arousable at this time              Medications:  [id number]    Scheduledatorvastatin, 40 mg, Daily  famotidine, 20 mg, Daily  heparin (porcine), 5,000 Units, Q8H  insulin aspart U-100, 14 Units, 6 times per day  [START ON 10/31/2024] insulin glargine U-100, 32 Units, Daily  lacosamide, 200 mg, Q12H  levetiracetam, 1,500 mg, BID  silodosin, 4 mg, Daily    PRNacetaminophen, 650 mg, Q6H PRN  D10W, , Continuous PRN  dextrose 10%, 12.5 g, PRN  dextrose 10%, 25 g, PRN  fentaNYL, 50 mcg, Q1H PRN  glucagon (human recombinant), 1 mg, PRN  hydrALAZINE, 10 mg, Q4H PRN  insulin aspart U-100, 0-10 Units, Q4H PRN  labetalol, 10 mg, Q4H PRN  magnesium oxide, 800 mg, PRN  magnesium oxide, 800 mg, PRN  ondansetron, 4 mg, Q8H  PRN  potassium bicarbonate, 35 mEq, PRN  potassium bicarbonate, 50 mEq, PRN  potassium bicarbonate, 60 mEq, PRN  potassium, sodium phosphates, 2 packet, PRN  potassium, sodium phosphates, 2 packet, PRN  potassium, sodium phosphates, 2 packet, PRN      Today I personally reviewed pertinent medications, lines/drains/airways, laboratory results,     Diet  Diet NPO Except for: Medication  Diet NPO Except for: Medication  Assessment/Plan:     Neuro  * Traumatic right-sided intracerebral hemorrhage without loss of consciousness  83M PMHx of HTN, HLD, DM2, CAD s/p CABG x2, Afib on Coumadin, ILD on 4L of O2, HFrEF (45%), presenting as a transfer for a large traumatic R frontal IPH reversed with Kcentra and vitamin K with repeat INR 1.2.    Admit to [facility]  q1h neuro checks, vitals, and I&O's  CBC, CMP, mag, and phos daily   Coags, TSH, A1c, lipid panel, UA  Echo, EKG, CXR  SBP <160  Na goals > 135  NSGY signed off  NPO   SCDs; heparin for VTE prophylaxis  PT/OT/SLP when extubated  Conversation had with family 10/30 regarding patient's status - given that his seizures have stopped and we are still weaning AEDs, will pursue trach and PEG to allow for additional time to recover neurologically    Intracranial hemorrhage  See primary problem    Subdural hematoma  See primary problem    Focal seizures  EEG placed morning of 10/21 due to delay in return to expected LOC  -Multiple seizures throughout the day  -Has been loaded with keppra and vimpat  -Addl seizure evening of 10/24, Onfi added on.   -Current AED regimen:   - Keppra 1500 BID   - Vimpat 200 BID  -No seizures 10/30    Psychiatric  Major depressive disorder, recurrent episode, mild  Restart home SSRI once able to take p.o.    Pulmonary  ILD (interstitial lung disease)  ILD on 4L of O2 at home    SpO2 goal > 88%  Duo nebs and tiotropium  Currently intubated    Cardiac/Vascular  Chronic combined systolic and diastolic heart failure  06/29/2024 echo with EF 45%, was previously  30-40%    Repeat echo shows his EF is still 45%  HDS    Persistent atrial fibrillation  In atrial fibrillation on admit  Hold Coumadin in setting of acute intraparenchymal hemorrhage  Heparin subcu for VTE prophylaxis    Hyperlipidemia LDL goal <100  Lipid panel showed low LDL, low HDL, low total cholesterol  Atorvastatin    Coronary artery disease  S/p CABG x2 in 2004  Patient has reportedly not been on Plavix recently because of his Coumadin      Benign hypertension with chronic kidney disease, stage III  SBP goal < 160  PRN labetalol and hydralazine   Propofol discontinued, will schedule home meds as needed for HTN      Renal/  LEANDRO (acute kidney injury)  LEANDRO on CKD, Cr 1.8 on initial presentation to ED    Recent Labs     10/28/24  0029 10/29/24  0258 10/30/24  0229   CREATININE 1.4 1.6* 1.6*   EGFRNORACEVR 50.2* 42.8* 42.8*        Plan  - LEANDRO is resolved - Cr increasing again, possibly due to vanc?  - Avoid nephrotoxins and renally dose meds for GFR listed above  - Monitor urine output, serial BMP, and adjust therapy as needed  - Q1H I&Os    Chronic kidney disease, stage 3a  Cr 1.8 on initial presentation to ED, increased to 2.6, LEANDRO resolved, Cr increasing again to 1.6    Creatine stable for now. BMP reviewed- noted Estimated Creatinine Clearance: 37.5 mL/min (A) (based on SCr of 1.6 mg/dL (H)). according to latest data. Based on current GFR, CKD stage is stage 3 - GFR 30-59.  Monitor UOP and serial BMP and adjust therapy as needed. Renally dose meds. Avoid nephrotoxic medications and procedures.    Oncology  Leukocytosis  Elevated WBC 13.5 today. Procal elevated at 0.82.  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 Beta hydroxybutyrate and urine ketones negative. Serum CO2 22. Given SQ insulin at OSH.  On arrival to [facility], . Hb A1c 9.9% on admit.     Patient's FSGs are uncontrolled due to hyperglycemia on current medication regimen.  Last A1c reviewed-   Lab Results   Component Value Date    LABA1C 6.8 10/09/2017    HGBA1C 9.9 (H) 10/17/2024     Most recent fingerstick glucose reviewed-   Recent Labs   Lab 10/30/24  0542 10/30/24  0815 10/30/24  1212 10/30/24  1718   POCTGLUCOSE 212* 185* 158* 149*     Current correctional scale  Medium  Increase anti-hyperglycemic dose as follows-   Antihyperglycemics (From admission, onward)      Start     Stop Route Frequency Ordered    10/31/24 0900  insulin glargine U-100 (Lantus) pen 32 Units         -- SubQ Daily 10/30/24 0856    10/30/24 1200  insulin aspart U-100 pen 14 Units         -- SubQ 6 times per day 10/30/24 0856    10/25/24 1133  insulin aspart U-100 pen 0-10 Units         -- SubQ Every 4 hours PRN 10/25/24 1036    10/22/24 1400  insulin regular in 0.9 % NaCl 100 unit/100 mL (1 unit/mL) infusion        Question Answer Comment   Insulin Rate Adjustment (DO NOT MODIFY ANSWER) [email or web address]    Enter initial dose from Infusion Protocol Chart (Units/hr): 1.5        10/25/24 1431 IV Continuous 10/22/24 1252    10/21/24 1145  insulin regular in 0.9 % NaCl 100 unit/100 mL (1 unit/mL) infusion        Question:  Enter initial dose from Infusion Protocol Chart (Units/hr):  Answer:  1.65    10/21/24 1343 IV Continuous 10/21/24 1042          Hold Oral hypoglycemics while patient is in the hospital.      Other  Obstructive sleep apnea treated with BiPAP  Currently intubated.  Daily ABG  Daily CXR          The patient is being Prophylaxed for:  Venous Thromboembolism with: Mechanical or Chemical  Stress Ulcer with: H2B  Ventilator Pneumonia with: chlorhexidine oral  care    Activity Orders            Turn patient starting at 10/18 0000    Diet NPO Except for: Medication: NPO starting at 10/17 7237          Full Code    Dolores Moore DO  Neurocritical Care  [facility] - Neuro Critical Care

## 2024-10-30 NOTE — PLAN OF CARE
"[facility] Care Plan    POC reviewed with Percy Ramirez and family at 0300. Patient verbalized understanding. Questions and concerns addressed. No acute events today. Pt progressing toward goals. Will continue to monitor. See below and flowsheets for full assessment and VS info.     - NP notified of Hgb 6.7, redraw hbg is 7.1  - Longoria in place      Is this a stroke patient? yes- Stroke booklet reviewed with patient and family, risk factors identified for patient and stroke booklet remains at bedside for ongoing education.     Care individualization: Television, lightweight blankets    Neuro:  [place] Coma Scale  Best Eye Response: 1-->(E1) none  Best Motor Response: 3-->(M3) flexion to pain  Best Verbal Response: 1-->(V1) none  F[place] Coma Scale Score: 5  Assessment Qualifiers: patient intubated, no eye obstruction present  Pupil PERRLA: yes     24 hr Temp:  [98.9 °F (37.2 °C)-99.4 °F (37.4 °C)]     CV:   Rhythm: atrial rhythm  BP goals:   SBP < 160  MAP > 65    Resp:      Vent Mode: A/C  Set Rate: 16 BPM  Oxygen Concentration (%): 40  Vt Set: 500 mL  PEEP/CPAP: 5 cmH20  Pressure Support: 5 cmH20    Plan: wean to extubate    GI/:     Diet/Nutrition Received: tube feeding  Last Bowel Movement: 10/29/24  Voiding Characteristics: urethral catheter (bladder)    Intake/Output Summary (Last 24 hours) at 10/30/2024 0343  Last data filed at 10/30/2024 0302  Gross per 24 hour   Intake 1800.61 ml   Output 1175 ml   Net 625.61 ml     Unmeasured Output  Urine Occurrence: 1  Stool Occurrence: 1  Pad Count: 1    Labs/Accuchecks:  Recent Labs   Lab 10/29/24  0258   WBC 13.51*   RBC 2.68*   HGB 7.6*   HCT 24.2*         Recent Labs   Lab 10/29/24  0258      K 4.9   CO2 24      BUN 45*   CREATININE 1.6*   ALKPHOS 101   ALT 15   AST 42*   BILITOT 0.4    No results for input(s): "PROTIME", "INR", "APTT", "HEPANTIXA" in the last 168 hours. No results for input(s): "CPK", "CPKMB", "TROPONINI", "MB" in the last 168 " hours.    Electrolytes: N/A - electrolytes WDL  Accuchecks: Q4H    Gtts:   D10W   Intravenous Continuous PRN           LDA/Wounds:    Mykel Risk Assessment  Sensory Perception: 2-->very limited  Moisture: 3-->occasionally moist  Activity: 1-->bedfast  Mobility: 2-->very limited  Nutrition: 3-->adequate  Friction and Shear: 2-->potential problem  Mykel Score: 13  Is your mykel score 12 or less? no    Nurses Note -- 4 Eyes    Is there altered skin present?  No  Second RN/Staff Member:  RADHA Obregon      Restraints: N/A    WCTM       Problem: Adult Inpatient Plan of Care  Goal: Plan of Care Review  Outcome: Progressing  Goal: Patient-Specific Goal (Individualized)  Outcome: Progressing  Goal: Absence of Hospital-Acquired Illness or Injury  Outcome: Progressing  Goal: Optimal Comfort and Wellbeing  Outcome: Progressing  Goal: Readiness for Transition of Care  Outcome: Progressing     Problem: Stroke, Intracerebral Hemorrhage  Goal: Optimal Coping  Outcome: Progressing  Goal: Effective Bowel Elimination  Outcome: Progressing  Goal: Optimal Cerebral Tissue Perfusion  Outcome: Progressing  Goal: Optimal Cognitive Function  Outcome: Progressing  Goal: Effective Communication Skills  Outcome: Progressing  Goal: Optimal Functional Ability  Outcome: Progressing  Goal: Optimal Nutrition Intake  Outcome: Progressing  Goal: Optimal Pain Control and Function  Outcome: Progressing  Goal: Effective Oxygenation and Ventilation  Outcome: Progressing  Goal: Improved Sensorimotor Function  Outcome: Progressing  Goal: Safe and Effective Swallow  Outcome: Progressing  Goal: Effective Urinary Elimination  Outcome: Progressing     Problem: Acute Kidney Injury/Impairment  Goal: Fluid and Electrolyte Balance  Outcome: Progressing  Goal: Improved Oral Intake  Outcome: Progressing  Goal: Effective Renal Function  Outcome: Progressing     Problem: Skin Injury Risk Increased  Goal: Skin Health and Integrity  Outcome: Progressing

## 2024-10-30 NOTE — CONSULTS
"[facility]  General Surgery  Consult Note    Patient Name: Percy Ramirez  MRN: [id number]  Code Status: Full Code  Admission Date: 10/17/2024  Hospital Length of Stay: 13 days  Attending Physician: Zheng Sykes MD  Primary Care Provider: Kasie Edmondson MD    Patient information was obtained from past medical records and primary team.     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 He has had significant seizure activity and has remained intubated due to his mental status. A goals of care meeting was held with his daughters who wish to pursue tracheostomy and enteral access creation.    No current facility-administered medications on file prior to encounter.     Current Outpatient Medications on File Prior to Encounter   Medication Sig    acetaminophen (TYLENOL) 500 MG tablet Take 1 tablet (500 mg total) by mouth every 6 (six) hours as needed.    albuterol (PROVENTIL) 2.5 mg /3 mL (0.083 %) nebulizer solution Take 3 mLs (2.5 mg total) by nebulization every 6 to 8 hours as needed for Wheezing.    albuterol (PROVENTIL/VENTOLIN HFA) 90 mcg/actuation inhaler INHALE 2 PUFFS BY MOUTH EVERY 6 HOURS AS NEEDED FOR WHEEZING OR  SHORTNESS  OF  BREATH    atorvastatin (LIPITOR) 40 MG tablet Take 1 tablet by mouth every morning.    BD INSULIN PEN NEEDLE UF SHORT 31 gauge x 5/16" Ndle     BD INSULIN SYRINGE ULTRA-FINE 1/2 mL 31 gauge x 15/64" Syrg     blood sugar diagnostic Strp To check BG 3 times daily, to use with insurance preferred meter    carvediloL (COREG) 3.125 MG tablet Take 1 tablet (3.125 mg total) by mouth 2 (two) times daily with meals.    ceramides 1,3,6-II (CERAVE DAILY MOISTURIZING) Lotn Apply " "twice daily to skin    cinnamon bark 500 mg capsule Take 1,000 mg by mouth once daily.      clopidogreL (PLAVIX) 75 mg tablet Take 75 mg by mouth.    diclofenac sodium (VOLTAREN) 1 % Gel Apply 2 g topically 3 (three) times daily.    DULoxetine (CYMBALTA) 60 MG capsule Take 1 capsule (60 mg total) by mouth once daily.    fluticasone propionate (FLONASE) 50 mcg/actuation nasal spray 1 spray (50 mcg total) by Each Nostril route 2 (two) times daily.    furosemide (LASIX) 40 MG tablet Take 40 mg by mouth once daily.    gabapentin (NEURONTIN) 300 MG capsule Take 4 capsules (1,200 mg total) by mouth 2 (two) times daily.    glimepiride (AMARYL) 4 MG tablet Take 4 mg by mouth daily with breakfast.     HYDROcodone-acetaminophen (NORCO) 7.5-325 mg per tablet Take 1 tablet by mouth every 8 (eight) hours as needed for Pain.    [START ON 11/3/2024] HYDROcodone-acetaminophen (NORCO) 7.5-325 mg per tablet Take 1 tablet by mouth every 8 (eight) hours as needed for Pain.    insulin NPH-insulin regular, 70/30, (NOVOLIN 70/30) 100 unit/mL (70-30) injection Inject into the skin. Inject 10 units at breakfast and inject 10 units at night    insulin syringe-needle U-100 0.3 mL 31 gauge x 15/64" Syrg USE TO INJECT INSULIN THREE TIMES DAILY    insulin syringe-needle U-100 1/2 mL 31 x 5/16" Syrg     isosorbide mononitrate (IMDUR) 30 MG 24 hr tablet Take 30 mg by mouth once daily.    lancets Misc To check BG 3 times daily, to use with insurance preferred meter    levETIRAcetam (KEPPRA) 500 MG Tab Take 1 tablet (500 mg total) by mouth 2 (two) times daily. for 7 days    LIDOcaine (LIDODERM) 5 % Place 1 patch onto the skin once daily. Remove & Discard patch within 12 hours or as directed by MD    meclizine (ANTIVERT) 12.5 mg tablet Take 1 tablet (12.5 mg total) by mouth 3 (three) times daily as needed for Dizziness.    methocarbamoL (ROBAXIN) 500 MG Tab TAKE 1 TABLET BY MOUTH 4 TIMES DAILY FOR 10 DAYS    mupirocin (BACTROBAN) 2 % ointment Apply " topically 3 (three) times daily.    nitroGLYCERIN (NITROSTAT) 0.4 MG SL tablet DISSOLVE 1 TABLET UNDER THE TONGUE EVERY 5 MINUTES AS  NEEDED FOR CHEST PAIN. MAX  OF 3 TABLETS IN 15 MINUTES. CALL 911 IF PAIN PERSISTS.    omeprazole (PRILOSEC) 20 MG capsule Take 1 capsule (20 mg total) by mouth once daily.    pravastatin (PRAVACHOL) 20 MG tablet Take 1 tablet (20 mg total) by mouth once daily.    spironolactone (ALDACTONE) 25 MG tablet Take 0.5 tablets (12.5 mg total) by mouth once daily.    tiotropium-olodateroL (STIOLTO RESPIMAT) 2.5-2.5 mcg/actuation Mist Inhale 2 puffs into the lungs once daily. Controller    triamcinolone acetonide 0.1% (KENALOG) 0.1 % cream SMARTSI Application Topical 2-3 Times Daily    valsartan (DIOVAN) 40 MG tablet Take 1 tablet (40 mg total) by mouth once daily.    VIT C/VIT E AC/LUT/COPPER/ZINC (PRESERVISION LUTEIN ORAL) Take by mouth.    warfarin (COUMADIN) 5 MG tablet Take 2 tabs by mouth on Tuesday,Thursday, Saturday and Sundays then 1.5 on all other days.       Review of patient's allergies indicates:   Allergen Reactions    Aricept odt [donepezil] Diarrhea and Nausea And Vomiting    Codeine Nausea Only     weak    Ranexa [ranolazine]        Past Medical History:   Diagnosis Date    Allergy     Arthritis     CAD, multiple vessel     DR Melissa    Cataract     Depression     History of colon polyps     Hyperlipidemia     Hypertension     Macular degeneration     Dr Razo for injection, Jennifer for eye    S/P CABG x 2     Type 2 diabetes mellitus with circulatory disorder     Dr. Aquino     Past Surgical History:   Procedure Laterality Date    broken elbow      left    COLONOSCOPY N/A 10/4/2017    Procedure: COLONOSCOPY;  Surgeon: Gabriel Leung MD;  Location: [facility];  Service: Endoscopy;  Laterality: N/A;    CRANIOTOMY FOR EVACUATION OF SUBDURAL HEMATOMA Right 10/18/2024    Procedure: CRANIOTOMY, FOR SUBDURAL HEMATOMA EVACUATION;  Surgeon: Laurence Condon MD;  Location:  [facility] OR 2ND FLR;  Service: Neurosurgery;  Laterality: Right;    CRANIOTOMY FOR EVACUATION OF SUBDURAL HEMATOMA Right 10/18/2024    Procedure: CRANIOTOMY, Right supraorbital FOR HEMATOMA EVACUATION;  Surgeon: Laurence Condon MD;  Location: [facility] OR 2ND FLR;  Service: Neurosurgery;  Laterality: Right;    EYE SURGERY      cataract    heart bypass          HERNIA REPAIR      right    ROTATOR CUFF REPAIR      right  2004     Family History       Problem Relation (Age of Onset)    Arthritis Mother    Cancer Brother, Maternal Grandfather    Diabetes Mother, Maternal Aunt, Maternal Uncle, Paternal Aunt    Heart attack Father    Heart disease Maternal Uncle, Paternal Aunt, Paternal Uncle, Maternal Grandmother    Stroke Mother    Throat cancer Brother          Tobacco Use    Smoking status: Former     Current packs/day: 0.00     Average packs/day: 3.0 packs/day for 45.0 years (135.0 ttl pk-yrs)     Types: Cigarettes     Start date: 1959     Quit date: 2004     Years since quittin.5    Smokeless tobacco: Former   Substance and Sexual Activity    Alcohol use: Yes     Comment: was heavy drinker 35 years ago, rare use now    Drug use: No    Sexual activity: Yes     Partners: Female     Review of Systems   Unable to perform ROS: Intubated     Objective:     Vital Signs (Most Recent):  Temp: 98.3 °F (36.8 °C) (10/30/24 1502)  Pulse: 84 (10/30/24 1640)  Resp: 20 (10/30/24 1640)  BP: 136/70 (10/30/24 1602)  SpO2: 100 % (10/30/24 1640) Vital Signs (24h Range):  Temp:  [97.2 °F (36.2 °C)-99.4 °F (37.4 °C)] 98.3 °F (36.8 °C)  Pulse:  [] 84  Resp:  [10-23] 20  SpO2:  [97 %-100 %] 100 %  BP: (114-166)/(57-90) 136/70     Weight: 83.5 kg (184 lb)  Body mass index is 27.98 kg/m².     Physical Exam  Vitals reviewed.   Constitutional:       Appearance: He is toxic-appearing.      Comments: EEG cap on   HENT:      Head: Normocephalic.      Nose:      Comments: Tube feeds at 60/hr  Eyes:      Extraocular Movements:  Extraocular movements intact.      Conjunctiva/sclera: Conjunctivae normal.   Cardiovascular:      Rate and Rhythm: Normal rate and regular rhythm.   Pulmonary:      Effort: Pulmonary effort is normal. No respiratory distress.      Comments: Mechanically ventilated. Minimal settings  Abdominal:      Palpations: Abdomen is soft.      Tenderness: There is no abdominal tenderness.   Musculoskeletal:         General: No signs of injury.      Cervical back: Normal range of motion and neck supple.      Right lower leg: No edema.      Left lower leg: No edema.   Skin:     General: Skin is warm and dry.   Neurological:      General: No focal deficit present.      Mental Status: He is oriented to person, place, and time.            I have reviewed all pertinent lab results within the past 24 hours.  CBC:   Recent Labs   Lab 10/30/24  0541   WBC 12.51   RBC 2.53*   HGB 7.1*   HCT 24.0*      MCV 95   MCH 28.1   MCHC 29.6*     CMP:   Recent Labs   Lab 10/30/24  0229   *   CALCIUM 8.8   ALBUMIN 1.8*   PROT 6.4      K 4.6   CO2 25      BUN 41*   CREATININE 1.6*   ALKPHOS 105   ALT 16   AST 32   BILITOT 0.3       Significant Diagnostics:  I have reviewed all pertinent imaging results/findings within the past 24 hours.    Assessment/Plan:     Intracranial hemorrhage  85 yo M with CHF, Afib on coumadin, and DM here after a traumatic ICH. He has had a prolonged course of mechanical ventilation and his mental status does not allow for trials of extubation. He will also need enteral access.    -To the OR tomorrow for trach/PEG  -Will call family for consent  -Please hold tube feeds at midnight. Order placed  -OK for DVT ppx.      VTE Risk Mitigation (From admission, onward)           Ordered     heparin (porcine) injection 5,000 Units  Every 8 hours         10/22/24 1121     IP VTE HIGH RISK PATIENT  Once         10/17/24 2326     Place sequential compression device  Until discontinued         10/17/24 2321      Reason for No Pharmacological VTE Prophylaxis  Once        Question:  Reasons:  Answer:  Active Bleeding    10/17/24 5818                    Thank you for your consult. I will follow-up with patient. Please contact us if you have any additional questions.    Forrest Reed MD  General Surgery  [facility] - Neuro Critical Care

## 2024-10-30 NOTE — ASSESSMENT & PLAN NOTE
Cr 1.8 on initial presentation to ED, increased to 2.6, LEANDRO resolved, Cr increasing again to 1.6    Creatine stable for now. BMP reviewed- noted Estimated Creatinine Clearance: 37.5 mL/min (A) (based on SCr of 1.6 mg/dL (H)). according to latest data. Based on current GFR, CKD stage is stage 3 - GFR 30-59.  Monitor UOP and serial BMP and adjust therapy as needed. Renally dose meds. Avoid nephrotoxic medications and procedures.

## 2024-10-31 ENCOUNTER — ANESTHESIA (OUTPATIENT)
Dept: SURGERY | Facility: HOSPITAL | Age: 84
End: 2024-10-31
Payer: MEDICARE

## 2024-10-31 ENCOUNTER — DOCUMENTATION ONLY (OUTPATIENT)
Dept: NEUROLOGY | Facility: CLINIC | Age: 84
End: 2024-10-31
Payer: MEDICARE

## 2024-10-31 LAB
ALBUMIN SERPL BCP-MCNC: 1.8 G/DL (ref 3.5–5.2)
ALLENS TEST: ABNORMAL
ALLENS TEST: ABNORMAL
ALP SERPL-CCNC: 87 U/L (ref 40–150)
ALT SERPL W/O P-5'-P-CCNC: 16 U/L (ref 10–44)
ANION GAP SERPL CALC-SCNC: 9 MMOL/L (ref 8–16)
AST SERPL-CCNC: 29 U/L (ref 10–40)
BASOPHILS # BLD AUTO: 0.02 K/UL (ref 0–0.2)
BASOPHILS NFR BLD: 0.2 % (ref 0–1.9)
BILIRUB SERPL-MCNC: 0.3 MG/DL (ref 0.1–1)
BUN SERPL-MCNC: 44 MG/DL (ref 8–23)
CALCIUM SERPL-MCNC: 8.7 MG/DL (ref 8.7–10.5)
CHLORIDE SERPL-SCNC: 110 MMOL/L (ref 95–110)
CO2 SERPL-SCNC: 26 MMOL/L (ref 23–29)
CREAT SERPL-MCNC: 1.4 MG/DL (ref 0.5–1.4)
DIFFERENTIAL METHOD BLD: ABNORMAL
EOSINOPHIL # BLD AUTO: 0.4 K/UL (ref 0–0.5)
EOSINOPHIL NFR BLD: 3.2 % (ref 0–8)
ERYTHROCYTE [DISTWIDTH] IN BLOOD BY AUTOMATED COUNT: 16.7 % (ref 11.5–14.5)
EST. GFR  (NO RACE VARIABLE): 50.2 ML/MIN/1.73 M^2
GLUCOSE SERPL-MCNC: 107 MG/DL (ref 70–110)
HCO3 UR-SCNC: 29.2 MMOL/L (ref 24–28)
HCO3 UR-SCNC: 30.1 MMOL/L (ref 24–28)
HCT VFR BLD AUTO: 24.2 % (ref 40–54)
HCT VFR BLD CALC: 16 %PCV (ref 36–54)
HCT VFR BLD CALC: 22 %PCV (ref 36–54)
HGB BLD-MCNC: 7.2 G/DL (ref 14–18)
IMM GRANULOCYTES # BLD AUTO: 0.55 K/UL (ref 0–0.04)
IMM GRANULOCYTES NFR BLD AUTO: 4.6 % (ref 0–0.5)
LYMPHOCYTES # BLD AUTO: 0.6 K/UL (ref 1–4.8)
LYMPHOCYTES NFR BLD: 5.3 % (ref 18–48)
MAGNESIUM SERPL-MCNC: 2.7 MG/DL (ref 1.6–2.6)
MCH RBC QN AUTO: 27.5 PG (ref 27–31)
MCHC RBC AUTO-ENTMCNC: 29.8 G/DL (ref 32–36)
MCV RBC AUTO: 92 FL (ref 82–98)
MONOCYTES # BLD AUTO: 1.2 K/UL (ref 0.3–1)
MONOCYTES NFR BLD: 10.2 % (ref 4–15)
NEUTROPHILS # BLD AUTO: 9.2 K/UL (ref 1.8–7.7)
NEUTROPHILS NFR BLD: 76.5 % (ref 38–73)
NRBC BLD-RTO: 1 /100 WBC
PCO2 BLDA: 51 MMHG (ref 35–45)
PCO2 BLDA: 51.3 MMHG (ref 35–45)
PH SMN: 7.37 [PH] (ref 7.35–7.45)
PH SMN: 7.38 [PH] (ref 7.35–7.45)
PHOSPHATE SERPL-MCNC: 4.8 MG/DL (ref 2.7–4.5)
PLATELET # BLD AUTO: 195 K/UL (ref 150–450)
PMV BLD AUTO: 10.4 FL (ref 9.2–12.9)
PO2 BLDA: 56 MMHG (ref 80–100)
PO2 BLDA: 77 MMHG (ref 80–100)
POC BE: 4 MMOL/L
POC BE: 5 MMOL/L
POC SATURATED O2: 87 % (ref 95–100)
POC SATURATED O2: 94 % (ref 95–100)
POC TCO2: 31 MMOL/L (ref 23–27)
POC TCO2: 32 MMOL/L (ref 23–27)
POCT GLUCOSE: 100 MG/DL (ref 70–110)
POCT GLUCOSE: 101 MG/DL (ref 70–110)
POCT GLUCOSE: 127 MG/DL (ref 70–110)
POCT GLUCOSE: 212 MG/DL (ref 70–110)
POCT GLUCOSE: 222 MG/DL (ref 70–110)
POCT GLUCOSE: 232 MG/DL (ref 70–110)
POTASSIUM SERPL-SCNC: 5 MMOL/L (ref 3.5–5.1)
PROT SERPL-MCNC: 6 G/DL (ref 6–8.4)
RBC # BLD AUTO: 2.62 M/UL (ref 4.6–6.2)
SAMPLE: ABNORMAL
SAMPLE: ABNORMAL
SITE: ABNORMAL
SITE: ABNORMAL
SODIUM SERPL-SCNC: 145 MMOL/L (ref 136–145)
WBC # BLD AUTO: 12.07 K/UL (ref 3.9–12.7)

## 2024-10-31 PROCEDURE — 94640 AIRWAY INHALATION TREATMENT: CPT

## 2024-10-31 PROCEDURE — 99232 SBSQ HOSP IP/OBS MODERATE 35: CPT | Mod: 25,,, | Performed by: STUDENT IN AN ORGANIZED HEALTH CARE EDUCATION/TRAINING PROGRAM

## 2024-10-31 PROCEDURE — 37000009 HC ANESTHESIA EA ADD 15 MINS: Performed by: STUDENT IN AN ORGANIZED HEALTH CARE EDUCATION/TRAINING PROGRAM

## 2024-10-31 PROCEDURE — 27100171 HC OXYGEN HIGH FLOW UP TO 24 HOURS

## 2024-10-31 PROCEDURE — 94761 N-INVAS EAR/PLS OXIMETRY MLT: CPT

## 2024-10-31 PROCEDURE — 0B110F4 BYPASS TRACHEA TO CUTANEOUS WITH TRACHEOSTOMY DEVICE, OPEN APPROACH: ICD-10-PCS | Performed by: STUDENT IN AN ORGANIZED HEALTH CARE EDUCATION/TRAINING PROGRAM

## 2024-10-31 PROCEDURE — 94003 VENT MGMT INPAT SUBQ DAY: CPT

## 2024-10-31 PROCEDURE — 99900035 HC TECH TIME PER 15 MIN (STAT)

## 2024-10-31 PROCEDURE — 36600 WITHDRAWAL OF ARTERIAL BLOOD: CPT

## 2024-10-31 PROCEDURE — 85025 COMPLETE CBC W/AUTO DIFF WBC: CPT

## 2024-10-31 PROCEDURE — 99900026 HC AIRWAY MAINTENANCE (STAT)

## 2024-10-31 PROCEDURE — 0DH63UZ INSERTION OF FEEDING DEVICE INTO STOMACH, PERCUTANEOUS APPROACH: ICD-10-PCS | Performed by: STUDENT IN AN ORGANIZED HEALTH CARE EDUCATION/TRAINING PROGRAM

## 2024-10-31 PROCEDURE — 36000706: Performed by: STUDENT IN AN ORGANIZED HEALTH CARE EDUCATION/TRAINING PROGRAM

## 2024-10-31 PROCEDURE — 31600 PLANNED TRACHEOSTOMY: CPT | Mod: ,,, | Performed by: STUDENT IN AN ORGANIZED HEALTH CARE EDUCATION/TRAINING PROGRAM

## 2024-10-31 PROCEDURE — 37000008 HC ANESTHESIA 1ST 15 MINUTES: Performed by: STUDENT IN AN ORGANIZED HEALTH CARE EDUCATION/TRAINING PROGRAM

## 2024-10-31 PROCEDURE — 63600175 PHARM REV CODE 636 W HCPCS: Performed by: NURSE ANESTHETIST, CERTIFIED REGISTERED

## 2024-10-31 PROCEDURE — 84100 ASSAY OF PHOSPHORUS: CPT

## 2024-10-31 PROCEDURE — 82803 BLOOD GASES ANY COMBINATION: CPT

## 2024-10-31 PROCEDURE — 27201423 OPTIME MED/SURG SUP & DEVICES STERILE SUPPLY: Performed by: STUDENT IN AN ORGANIZED HEALTH CARE EDUCATION/TRAINING PROGRAM

## 2024-10-31 PROCEDURE — 83735 ASSAY OF MAGNESIUM: CPT

## 2024-10-31 PROCEDURE — 63600175 PHARM REV CODE 636 W HCPCS: Mod: JZ,JG

## 2024-10-31 PROCEDURE — 27200966 HC CLOSED SUCTION SYSTEM

## 2024-10-31 PROCEDURE — 99233 SBSQ HOSP IP/OBS HIGH 50: CPT | Mod: ,,, | Performed by: PHYSICIAN ASSISTANT

## 2024-10-31 PROCEDURE — 63600175 PHARM REV CODE 636 W HCPCS: Performed by: STUDENT IN AN ORGANIZED HEALTH CARE EDUCATION/TRAINING PROGRAM

## 2024-10-31 PROCEDURE — 95720 EEG PHY/QHP EA INCR W/VEEG: CPT | Mod: ,,, | Performed by: STUDENT IN AN ORGANIZED HEALTH CARE EDUCATION/TRAINING PROGRAM

## 2024-10-31 PROCEDURE — 25000003 PHARM REV CODE 250: Performed by: NURSE ANESTHETIST, CERTIFIED REGISTERED

## 2024-10-31 PROCEDURE — 80053 COMPREHEN METABOLIC PANEL: CPT

## 2024-10-31 PROCEDURE — 25000242 PHARM REV CODE 250 ALT 637 W/ HCPCS

## 2024-10-31 PROCEDURE — 99291 CRITICAL CARE FIRST HOUR: CPT | Mod: GC,,, | Performed by: INTERNAL MEDICINE

## 2024-10-31 PROCEDURE — 36000707: Performed by: STUDENT IN AN ORGANIZED HEALTH CARE EDUCATION/TRAINING PROGRAM

## 2024-10-31 PROCEDURE — 20000000 HC ICU ROOM

## 2024-10-31 PROCEDURE — 25000003 PHARM REV CODE 250: Performed by: INTERNAL MEDICINE

## 2024-10-31 PROCEDURE — 43246 EGD PLACE GASTROSTOMY TUBE: CPT | Mod: 51,,, | Performed by: STUDENT IN AN ORGANIZED HEALTH CARE EDUCATION/TRAINING PROGRAM

## 2024-10-31 PROCEDURE — 25000003 PHARM REV CODE 250

## 2024-10-31 PROCEDURE — 63600175 PHARM REV CODE 636 W HCPCS

## 2024-10-31 RX ORDER — ROCURONIUM BROMIDE 10 MG/ML
INJECTION, SOLUTION INTRAVENOUS
Status: DISCONTINUED | OUTPATIENT
Start: 2024-10-31 | End: 2024-10-31

## 2024-10-31 RX ORDER — CEFAZOLIN SODIUM 1 G/3ML
INJECTION, POWDER, FOR SOLUTION INTRAMUSCULAR; INTRAVENOUS
Status: DISCONTINUED | OUTPATIENT
Start: 2024-10-31 | End: 2024-10-31

## 2024-10-31 RX ORDER — VASOPRESSIN 20 [USP'U]/ML
INJECTION, SOLUTION INTRAMUSCULAR; SUBCUTANEOUS
Status: DISCONTINUED | OUTPATIENT
Start: 2024-10-31 | End: 2024-10-31

## 2024-10-31 RX ORDER — LIDOCAINE HYDROCHLORIDE 10 MG/ML
INJECTION, SOLUTION INFILTRATION; PERINEURAL
Status: DISCONTINUED | OUTPATIENT
Start: 2024-10-31 | End: 2024-10-31 | Stop reason: HOSPADM

## 2024-10-31 RX ORDER — INSULIN GLARGINE 100 [IU]/ML
20 INJECTION, SOLUTION SUBCUTANEOUS DAILY
Status: DISCONTINUED | OUTPATIENT
Start: 2024-10-31 | End: 2024-10-31

## 2024-10-31 RX ORDER — ONDANSETRON HYDROCHLORIDE 2 MG/ML
INJECTION, SOLUTION INTRAVENOUS
Status: DISCONTINUED | OUTPATIENT
Start: 2024-10-31 | End: 2024-10-31

## 2024-10-31 RX ORDER — PHENYLEPHRINE HYDROCHLORIDE 10 MG/ML
INJECTION INTRAVENOUS
Status: DISCONTINUED | OUTPATIENT
Start: 2024-10-31 | End: 2024-10-31

## 2024-10-31 RX ORDER — SODIUM CHLORIDE FOR INHALATION 3 %
4 VIAL, NEBULIZER (ML) INHALATION EVERY 6 HOURS
Status: DISCONTINUED | OUTPATIENT
Start: 2024-10-31 | End: 2024-11-08 | Stop reason: HOSPADM

## 2024-10-31 RX ORDER — INSULIN ASPART 100 [IU]/ML
8 INJECTION, SOLUTION INTRAVENOUS; SUBCUTANEOUS
Status: DISCONTINUED | OUTPATIENT
Start: 2024-10-31 | End: 2024-10-31

## 2024-10-31 RX ORDER — LEVETIRACETAM 100 MG/ML
750 SOLUTION ORAL 2 TIMES DAILY
Status: DISCONTINUED | OUTPATIENT
Start: 2024-10-31 | End: 2024-11-01

## 2024-10-31 RX ORDER — DEXAMETHASONE SODIUM PHOSPHATE 4 MG/ML
INJECTION, SOLUTION INTRA-ARTICULAR; INTRALESIONAL; INTRAMUSCULAR; INTRAVENOUS; SOFT TISSUE
Status: DISCONTINUED | OUTPATIENT
Start: 2024-10-31 | End: 2024-10-31

## 2024-10-31 RX ORDER — MIDAZOLAM HYDROCHLORIDE 1 MG/ML
INJECTION, SOLUTION INTRAMUSCULAR; INTRAVENOUS
Status: DISCONTINUED | OUTPATIENT
Start: 2024-10-31 | End: 2024-10-31

## 2024-10-31 RX ADMIN — LABETALOL HYDROCHLORIDE 10 MG: 5 INJECTION, SOLUTION INTRAVENOUS at 10:10

## 2024-10-31 RX ADMIN — DEXAMETHASONE SODIUM PHOSPHATE 4 MG: 4 INJECTION, SOLUTION INTRAMUSCULAR; INTRAVENOUS at 02:10

## 2024-10-31 RX ADMIN — HEPARIN SODIUM 5000 UNITS: 5000 INJECTION INTRAVENOUS; SUBCUTANEOUS at 06:10

## 2024-10-31 RX ADMIN — LABETALOL HYDROCHLORIDE 10 MG: 5 INJECTION, SOLUTION INTRAVENOUS at 03:10

## 2024-10-31 RX ADMIN — ROCURONIUM BROMIDE 50 MG: 10 INJECTION, SOLUTION INTRAVENOUS at 01:10

## 2024-10-31 RX ADMIN — SODIUM CHLORIDE: 0.9 INJECTION, SOLUTION INTRAVENOUS at 01:10

## 2024-10-31 RX ADMIN — INSULIN ASPART 4 UNITS: 100 INJECTION, SOLUTION INTRAVENOUS; SUBCUTANEOUS at 07:10

## 2024-10-31 RX ADMIN — PHENYLEPHRINE HYDROCHLORIDE 200 MCG: 10 INJECTION INTRAVENOUS at 02:10

## 2024-10-31 RX ADMIN — LEVETIRACETAM 1500 MG: 500 SOLUTION ORAL at 08:10

## 2024-10-31 RX ADMIN — CEFAZOLIN 2 G: 330 INJECTION, POWDER, FOR SOLUTION INTRAMUSCULAR; INTRAVENOUS at 01:10

## 2024-10-31 RX ADMIN — INSULIN ASPART 4 UNITS: 100 INJECTION, SOLUTION INTRAVENOUS; SUBCUTANEOUS at 12:10

## 2024-10-31 RX ADMIN — SODIUM CHLORIDE SOLN NEBU 3% 4 ML: 3 NEBU SOLN at 07:10

## 2024-10-31 RX ADMIN — LEVETIRACETAM 750 MG: 500 SOLUTION ORAL at 09:10

## 2024-10-31 RX ADMIN — INSULIN ASPART 3 UNITS: 100 INJECTION, SOLUTION INTRAVENOUS; SUBCUTANEOUS at 09:10

## 2024-10-31 RX ADMIN — LACOSAMIDE 200 MG: 50 TABLET, FILM COATED ORAL at 08:10

## 2024-10-31 RX ADMIN — HEPARIN SODIUM 5000 UNITS: 5000 INJECTION INTRAVENOUS; SUBCUTANEOUS at 09:10

## 2024-10-31 RX ADMIN — LACOSAMIDE 200 MG: 50 TABLET, FILM COATED ORAL at 09:10

## 2024-10-31 RX ADMIN — INSULIN ASPART 4 UNITS: 100 INJECTION, SOLUTION INTRAVENOUS; SUBCUTANEOUS at 04:10

## 2024-10-31 RX ADMIN — ONDANSETRON 4 MG: 2 INJECTION INTRAMUSCULAR; INTRAVENOUS at 02:10

## 2024-10-31 RX ADMIN — MIDAZOLAM HYDROCHLORIDE 1 MG: 2 INJECTION, SOLUTION INTRAMUSCULAR; INTRAVENOUS at 01:10

## 2024-10-31 RX ADMIN — VASOPRESSIN 2 UNITS: 20 INJECTION INTRAVENOUS at 02:10

## 2024-10-31 RX ADMIN — SUGAMMADEX 200 MG: 100 INJECTION, SOLUTION INTRAVENOUS at 02:10

## 2024-10-31 RX ADMIN — SODIUM CHLORIDE SOLN NEBU 3% 4 ML: 3 NEBU SOLN at 12:10

## 2024-10-31 NOTE — ASSESSMENT & PLAN NOTE
EEG placed morning of 10/21 due to delay in return to expected LOC  -Multiple seizures throughout the day  -Has been loaded with keppra and vimpat  -Addl seizure evening of 10/24, Onfi added on.   -Current AED regimen:   - Keppra 750 BID   - Vimpat 200 BID  -No seizures 10/31  -Keppra dose reduced to 750mg BID to renally dose; EEG cap removed

## 2024-10-31 NOTE — ASSESSMENT & PLAN NOTE
LEANDRO on CKD, Cr 1.8 on initial presentation to ED    Recent Labs     10/29/24  0258 10/30/24  0229 10/31/24  0347   CREATININE 1.6* 1.6* 1.4   EGFRNORACEVR 42.8* 42.8* 50.2*        Plan  - LEANDRO is resolved   - Avoid nephrotoxins and renally dose meds for GFR listed above  - Monitor urine output, serial BMP, and adjust therapy as needed  - Q1H I&Os

## 2024-10-31 NOTE — ASSESSMENT & PLAN NOTE
2/2 R IPH s/p R supraorbital craniotomy for evacuation 10/18 and R craniotomy for SDH evacuation on 10/19  84M PMHx of HTN, HLD, DM2, CAD s/p CABG x2, Afib on Coumadin, ILD on home O2, HFrEF admitted to [facility] as transfer from OSH For evaluation and management of large traumatic R frontal IPH now s/p R supraorbital craniotomy for hematoma evacuation 10/18 and R craniotomy for evacuation of post-op subdural hematoma 10/19. EEG initiated 10/21 showing right hemispheric subclinical seizures.    Recommendations:  - Discontinue vEEG monitoring  - Recommend to renally dose Levetiracetam to 750 mg BID  - Recommend to continue Lacosamide 200 mg BID  - Clobazam discontinued 10/30  - Propofol weaned off 10/28  - Avoid agents that lower seizure threshold  - Management of infectious/metabolic abnormalities per NCC  - Seizure precautions      Discussed plan of care with NCC team. No family at bedside. Will sign off, please call with questions.

## 2024-10-31 NOTE — SUBJECTIVE & OBJECTIVE
Interval History:      Yesterday evening, had a single apneic episode with spontaneous recovery. No EEG correlation. No other acute overnight events. No seizures per epilepsy.     Objective:     Vitals:  Temp: 98.6 °F (37 °C)  Pulse: 81  Rhythm: atrial rhythm  BP: (!) 150/82  MAP (mmHg): 100  Resp: 18  SpO2: 100 %  Oxygen Concentration (%): 40  Vent Mode: Spont  Pressure Support: 7 cmH20  PEEP/CPAP: 5 cmH20  Peak Airway Pressure: 13 cmH20  Mean Airway Pressure: 7.8 cmH20  Plateau Pressure: 13 cmH20    Temp  Min: 98.3 °F (36.8 °C)  Max: 98.6 °F (37 °C)  Pulse  Min: 68  Max: 100  BP  Min: 117/59  Max: 164/74  MAP (mmHg)  Min: 81  Max: 117  Resp  Min: 14  Max: 24  SpO2  Min: 93 %  Max: 100 %  Oxygen Concentration (%)  Min: 40  Max: 40    10/30 0701 - 10/31 0700  In: 1020   Out: 1785 [Urine:1785]   Unmeasured Output  Urine Occurrence: 1  Stool Occurrence: 1  Pad Count: 1        Physical Exam  Constitutional:       General: He is not in acute distress.     Appearance: He is not toxic-appearing.      Interventions: He is not intubated.  Cardiovascular:      Comments: Irregularly irregular rate and rhythm  Pulmonary:      Effort: No respiratory distress. He is not intubated.      Breath sounds: Normal breath sounds.   Abdominal:      General: Abdomen is flat. There is no distension.      Palpations: Abdomen is soft.   Musculoskeletal:      Comments: LUE swelling improving   Skin:     General: Skin is warm and dry.   Neurological:      Comments: Triple flexion BLE              Medications:  [id number]    Scheduledatorvastatin, 40 mg, Daily  famotidine, 20 mg, Daily  heparin (porcine), 5,000 Units, Q8H  lacosamide, 200 mg, Q12H  levetiracetam, 750 mg, BID  silodosin, 4 mg, Daily  sodium chloride 3%, 4 mL, Q6H    PRNacetaminophen, 650 mg, Q6H PRN  D10W, , Continuous PRN  dextrose 10%, 12.5 g, PRN  dextrose 10%, 25 g, PRN  glucagon (human recombinant), 1 mg, PRN  hydrALAZINE, 10 mg, Q4H PRN  insulin aspart U-100, 0-10  Units, Q4H PRN  labetalol, 10 mg, Q4H PRN  magnesium oxide, 800 mg, PRN  magnesium oxide, 800 mg, PRN  ondansetron, 4 mg, Q8H PRN  potassium bicarbonate, 35 mEq, PRN  potassium bicarbonate, 50 mEq, PRN  potassium bicarbonate, 60 mEq, PRN  potassium, sodium phosphates, 2 packet, PRN  potassium, sodium phosphates, 2 packet, PRN  potassium, sodium phosphates, 2 packet, PRN      Today I personally reviewed pertinent medications, lines/drains/airways, laboratory results,   Diet  Diet NPO Except for: Medication  Diet NPO Except for: Medication

## 2024-10-31 NOTE — PLAN OF CARE
[facility]  Discharge Reassessment    Primary Care Provider: Kasie Edmondson MD    Expected Discharge Date: 11/1/2024    Reassessment (most recent)       Discharge Reassessment - 10/31/24 1524          Discharge Reassessment    Assessment Type Discharge Planning Reassessment (P)      Did the patient's condition or plan change since previous assessment? No (P)      Communicated TATI with patient/caregiver No (P)      Discharge Plan A Home with family (P)      Discharge Plan B Home (P)      DME Needed Upon Discharge  none (P)      Transition of Care Barriers None (P)      Why the patient remains in the hospital Requires continued medical care (P)         Post-Acute Status    Discharge Delays None known at this time (P)                    Pt is not medically ready to discharge.   Pt is getting a Trach and PEG placement.  SW will continue to monitor pt needs.       Discharge Plan A and Plan B have been determined by review of patient's clinical status, future medical and therapeutic needs, and coverage/benefits for post-acute care in coordination with multidisciplinary team members.    Haleigh Perdomo MSW, LCSW  [facility]  Case Management Dept.

## 2024-10-31 NOTE — NURSING
Pt arrived back to room following OR         Pt was escorted by RN and CRNA on cardiac monitoring, O2, and ambu bag.        Patient placed back on bedside monitor with alarms audible, bed in low position with bed alarm on, call light within reach. ANGELIA.

## 2024-10-31 NOTE — ASSESSMENT & PLAN NOTE
Cr 1.8 on initial presentation to ED, increased to 2.6, LEANDRO resolved    Creatine stable for now. BMP reviewed- noted Estimated Creatinine Clearance: 42.8 mL/min (based on SCr of 1.4 mg/dL). according to latest data. Based on current GFR, CKD stage is stage 3 - GFR 30-59.  Monitor UOP and serial BMP and adjust therapy as needed. Renally dose meds. Avoid nephrotoxic medications and procedures.

## 2024-10-31 NOTE — PROCEDURES
ICU EEG/VIDEO MONITORING REPORT    DATE OF SERVICE: 10/30/24-10/31/24  EEG NUMBER: FH [id number]  [facility]    METHODOLOGY   Electroencephalographic (EEG) recording is with electrodes placed according to the International 10-20 placement system.  Thirty two (32) channels of digital signal are simultaneously recorded from the scalp and may include EKG, EMG, and/or eye monitors.   Recording band pass was 0.1 to 512 hz.  Digital video recording of the patient is simultaneously recorded with the EEG.  The nursing staff report clinical symptoms and may press an event button when the patient has symptoms of clinical interest to the treating physicians.  EEG and video recording is stored and archived in digital format.  The entire recording is visually reviewed and the times identified by computer analysis as being spikes or seizures are reviewed again.  Activation procedures which include photic stimulation, hyperventilation and instructing patients to perform simple task are done in selected patients.   Compresses spectral analysis (CSA) is also performed on the activity recorded from each individual channel.  This is displayed as a power display of frequencies from 0 to 30 Hz over time.   The CSA analysis is done and displayed continuously.  This is reviewed for asymmetries in power between homologous areas of the scalp and for presence of changes in power which canbe seen when seizures occur.  Sections of suspected abnormalities on the CSA is then compared with the original EEG recording.     [employer] software was also utilized in the review of this study.  This software suite analyzes the EEG recording in multiple domains.  Coherence and rhythmicity is computed to identify EEG sections which may contain organized seizures.  Each channel undergoes analysis to detect presence of spike and sharp waves which have special and morphological characteristic of epileptic activity.  The routine EEG recording  is converted from spacial into frequency domain.  This is then displayed comparing homologous areas to identify areas of significant asymmetry.  Algorithm to identify non-cortically generated artifact is used to separate eye movement, EMG and other artifact from the EEG.      Recording Times  Start on 10/30/24 at 07:00  Stop on 10/31/24 at 07:00  24 hours EEG recorded  Start 10/31/24 for 4 hours 58 minutes    This EEG study is performed in the ICU with the patient on the following sedative medications: none    Indication: 83 y.o. male with a past medical history of CABG x2 on Coumadin, HTN, T2DM, HLD who was transferred from [facility] for management of large traumatic right frontal ICH.     State of Consciousness:   Coma    Background:   The background is nearly continuous with intermittent brief (up to 1-2 seconds) of suppression alternating with low amplitude theta/delta activity.  The left hemisphere is more suppressed than the right side.        Sleep:   The patient is in a comatose state throughout the record, with no normal sleep architecture appreciated    Epileptiform Abnormalities  No clear epileptiform activity    EKG:   Irregular rhythm    Seizures/Events:   Event button activation at 19:30 for apnea; no EEG correlate seen.    Impression:   Abnormal study demonstrating a discontinuous record with diffuse slowing and brief periods of suppression consistent with a severe diffuse encephalopathy.  No seizures are recorded.       Mayra Trammell MD  [facility]   Department of Neurology/Epilepsy

## 2024-10-31 NOTE — PLAN OF CARE
Post-PEG tube placement instructions:  Please leave PEG tube to gravity after surgery.  Ok to administer medications 6 hours after surgery via PEG but clamp tube for 30 mins after medication administration.  General Surgery will discuss timing of starting tube feeds.  Please call for bleeding or malfunction of PEG tube.        Rosemary Garcia MD  General Surgery and Surgical Critical Care  [facility] [place] 2nd Fl

## 2024-10-31 NOTE — SUBJECTIVE & OBJECTIVE
Interval History: NAEON. Afebrile. HDS. Minimal vent settings. TF held. To OR today for trach, PEG      Medications:  Continuous Infusions:   D10W   Intravenous Continuous PRN         Scheduled Meds:   atorvastatin  40 mg Per OG tube Daily    famotidine  20 mg Per OG tube Daily    heparin (porcine)  5,000 Units Subcutaneous Q8H    lacosamide  200 mg Per OG tube Q12H    levetiracetam  1,500 mg Per OG tube BID    silodosin  4 mg Per OG tube Daily     PRN Meds:  Current Facility-Administered Medications:     acetaminophen, 650 mg, Per OG tube, Q6H PRN    D10W, , Intravenous, Continuous PRN    dextrose 10%, 12.5 g, Intravenous, PRN    dextrose 10%, 25 g, Intravenous, PRN    fentaNYL, 50 mcg, Intravenous, Q1H PRN    glucagon (human recombinant), 1 mg, Intramuscular, PRN    hydrALAZINE, 10 mg, Intravenous, Q4H PRN    insulin aspart U-100, 0-10 Units, Subcutaneous, Q4H PRN    labetalol, 10 mg, Intravenous, Q4H PRN    magnesium oxide, 800 mg, Per OG tube, PRN    magnesium oxide, 800 mg, Per OG tube, PRN    ondansetron, 4 mg, Intravenous, Q8H PRN    potassium bicarbonate, 35 mEq, Per OG tube, PRN    potassium bicarbonate, 50 mEq, Per OG tube, PRN    potassium bicarbonate, 60 mEq, Per OG tube, PRN    potassium, sodium phosphates, 2 packet, Per OG tube, PRN    potassium, sodium phosphates, 2 packet, Per OG tube, PRN    potassium, sodium phosphates, 2 packet, Per OG tube, PRN     Review of patient's allergies indicates:   Allergen Reactions    Aricept odt [donepezil] Diarrhea and Nausea And Vomiting    Codeine Nausea Only     weak    Ranexa [ranolazine]      Objective:     Vital Signs (Most Recent):  Temp: 98.3 °F (36.8 °C) (10/31/24 0702)  Pulse: 87 (10/31/24 0702)  Resp: 18 (10/31/24 0702)  BP: 139/74 (10/31/24 0702)  SpO2: 97 % (10/31/24 0702) Vital Signs (24h Range):  Temp:  [97.2 °F (36.2 °C)-98.6 °F (37 °C)] 98.3 °F (36.8 °C)  Pulse:  [] 87  Resp:  [14-24] 18  SpO2:  [93 %-100 %] 97 %  BP: (114-164)/(57-90) 139/74      Weight: 83.5 kg (184 lb)  Body mass index is 27.98 kg/m².    Intake/Output - Last 3 Shifts         10/29 0700  10/30 0659 10/30 0700  10/31 0659 10/31 0700  11/01 0659    I.V. (mL/kg)       NG/GT 1170 1020     IV Piggyback 530.6      Total Intake(mL/kg) 1700.6 (20.4) 1020 (12.2)     Urine (mL/kg/hr) 1675 (0.8) 1785 (0.9)     Stool  0     Total Output 1675 1785     Net +25.6 -765            Stool Occurrence  1 x              Physical Exam  Vitals and nursing note reviewed.   Constitutional:       General: He is not in acute distress.     Appearance: He is ill-appearing.   HENT:      Head:      Comments: EEG cap     Nose: Nose normal.   Cardiovascular:      Rate and Rhythm: Normal rate.   Pulmonary:      Effort: Pulmonary effort is normal. No respiratory distress.      Comments: Vent Mode: Spont  Oxygen Concentration (%):  [40] 40  Resp Rate Total:  [13 br/min-22 br/min] 17 br/min  Vt Set:  [500 mL] 500 mL  PEEP/CPAP:  [5 cmH20] 5 cmH20  Pressure Support:  [7 cmH20] 7 cmH20  Mean Airway Pressure:  [7.5 cmH20-10 cmH20] 7.5 cmH20  Abdominal:      General: There is no distension.      Palpations: Abdomen is soft.      Tenderness: There is no abdominal tenderness. There is no guarding.   Musculoskeletal:         General: No deformity.   Skin:     General: Skin is warm and dry.          Significant Labs:  I have reviewed all pertinent lab results within the past 24 hours.    Significant Diagnostics:  I have reviewed all pertinent imaging results/findings within the past 24 hours.

## 2024-10-31 NOTE — ASSESSMENT & PLAN NOTE
ILD on 4L of O2 at home    SpO2 goal > 88%  Duo nebs and tiotropium  Currently intubated  Will add chest physiotherapy and HTN nebs to help thin secretions

## 2024-10-31 NOTE — OP NOTE
[facility]  General Surgery  Operative Note     DATE: 10/31/2024     PREOPERATIVE DIAGNOSES:   1. Intracranial hemorrhage  2. Dependent on ventilator  3. Dysphagia, unspecified     POSTOPERATIVE DIAGNOSES:   1. Intracranial hemorrhage  2. Dependent on ventilator  3. Dysphagia, unspecified.     PROCEDURES PERFORMED:   1. Open tracheostomy  2. Esophagogastroduodenoscopy  3. Percutaneous endoscopic gastrostomy.     ATTENDING SURGEON: Rosemary Garcia M.D.      HOUSESTAFF SURGEON: Forrest Reed M.D. (PGY-5); Ebenezer Buckley M.D. (PGY-5)     ANESTHESIA: General and local using 1% lidocaine     ESTIMATED BLOOD LOSS: 5 mL     FINDINGS: A #8 Shiley tracheostomy and 20-Georgian percutaneous gastrostomy @ 2.5 cm placed without apparent complication. Normal esophagus, small gastric polyp noted and two non-bleeding duodenal ulcers noted on EGD.     INDICATIONS: Percy Ramirez is an 84 year-old man admitted to [facility] with intracranial bleeding. The patient has failed attempts to wean from the ventilator. General Surgery was consulted for trach and PEG given his prolonged vent wean and need for enteral access. We have recommended to the family that the patient would benefit from placement of a tracheostomy tube for the anticipated prolonged need for mechanical ventilation.  We also recommended placement of a percutaneous gastrostomy tube given that the patient is expected to have prolonged dysphagia. We did obtain informed consent from the patient's family who expressed understanding of the risks and benefits and gave consent to proceed.      PROCEDURE: The patient was brought to the OR from the ICU. The patient was identified and monitored throughout. He was positioned appropriately and all pressure points were padded.  A time-out was done to identify the correct patient, procedure, and that preoperative antibiotics were given.  We noted appropriate positioning with neck in  extension and the anterior neck was prepped and draped. Approximately 5 ml of 1% lidocaine was injected in the subcutaneous tissues at the site of the tracheostomy. We identified our tracheal landmarks, made a 2 cm midline cervical incision. Subcutaneous tissue was dissected with electrocautery and hemostasis was obtained using a combination of electrocautery and 2-0 silk ties to ligate vessels. Appropriate position for the tracheostomy tube was noted between the 2nd and 3rd tracheal rings. Once we were down to the anterior trachea, we stopped using the bovie. The trachea was elevated with a tracheal hook. The trachea was incised transversely with an 11 blade scalpel. Two 2-0 Prolene stay sutures were placed on each side of the intended tracheostomy. The ET tube was pulled back and the tracheostomy was dilated with a tracheal . A #8 Shiley tracheostomy was placed into the trachea and immediately hooked up to the ventilator tubing. We did confirm appropriate end tidal CO2 as well as tidal volumes. The cuff of the tracheostomy tube was inflated and the endotracheal tube was removed and the tracheostomy tube was secured in place using 2-0 Prolene sutures and Velcro tracheostomy tape.     We then directed our attention to the left upper quadrant for gastrostomy tube placement. The patient's abdomen was prepped and draped. An upper endoscope was introduced into the oropharynx and guided down into the esophagus and stomach. The stomach was insufflated with air. We identified an appropriate position for gastrostomy tube placement 2 finger-breadths below the left subcostal margin. Palpation of the anterior abdominal wall at this point was visualized endoscopically and transillumination from the endoscope was visualized through the anterior abdominal wall. We made a 1.5 cm transverse skin incision. At this point, the stomach was cannulated with a catheter loaded on a needle. This was grasped by a snare which had been  passed through the endoscope. The needle was removed, and a guidewire was placed, and the snare was used to grasp the guidewire. The endoscope, snare, and guidewire were all withdrawn from the patient's mouth. A 20-Zimbabwean gastrostomy tube was loaded onto the guidewire and pulled through the anterior abdominal wall via Seldinger technique. Repeat endoscopy was performed with the gastrostomy tube at the 2.5 cm gerald at the skin. There was no blanching of the gastric mucosa, and when the tube was twisted, the button did not grab the mucosa. We advanced the scope into the duodenum and evaluated the first and second portions. There were no abnormalities. The scope was withdrawn back into the stomach. The insufflation in the stomach was evacuated, and the endoscope was removed. The gastrostomy tube was secured in place using the supplied devices and connected to a bag for gravity drainage. All sponge, instrument, and needle counts were correct at the termination of the procedure. I was present for and directed or performed the entire procedure.     DISPOSITION: The patient did well and was transported back to the ICU in hemodynamically stable condition.

## 2024-10-31 NOTE — ASSESSMENT & PLAN NOTE
S/p CABG x2 in 2004  Patient has reportedly not been on Plavix recently because of his Coumadin  -Will resume home aspirin after trach/PEG today

## 2024-10-31 NOTE — PROGRESS NOTES
[facility]  Neurology-Epilepsy  Progress Note    Patient Name: Percy Ramirez  MRN: [id number]  Admission Date: 10/17/2024  Hospital Length of Stay: 14 days  Code Status: Full Code   Attending Provider: Zheng Sykes MD  Primary Care Physician: Kasie Edmondson MD   Principal Problem:Focal seizures    Subjective:     Hospital Course:   10/21>10/22 EEG: Recurrent right hemispheric seizures with significant improvement noted in frequency after Propofol initiated 10/21 pm.  10/22>10/23 EEG: Highly epileptogenic right hemispheric structural lesion and severe diffuse encephalopathy, 11 subclinical seizures arising from the right parietal/temporal region   10/23>10/24 EEG: No further seizures after escalation of Propofol on 10/23, highly epileptogenic right hemispheric structural lesion and a severe diffuse encephalopathy  10/24>10/25 EEG: Highly epileptogenic right hemispheric structural lesion and a severe diffuse encephalopathy, one subclinical seizure at 19:14 on 10/24  OFF FOR MRI  10/26>10/27 EE electrographic right onset seizure after Propofol discontinued, burst suppression pattern with epileptiform bursts  10/27>10/28 EEG: suppressed, no electrographic seizures  10/28>10/29 EEG: remains suppressed, occasional right sharps, no electrographic seizures  10/29>10/30 EEG: discontinuous record with diffuse slowing and brief periods of suppression consistent with severe diffuse encephalopathy, no seizures  10/30>10/31 EEG: discontinuous with diffuse slowing and brief periods of suppression c/w severe encephalopathy, no seizures; PB for apnea with no EEG correlate    See EEG reports for details.    Interval History: No seizures on EEG. Patient going for trach/PEG today. No family at bedside on rounds.    Current Facility-Administered Medications   Medication Dose Route Frequency Provider Last Rate Last Admin    acetaminophen tablet 650 mg  650 mg Per OG tube Q6H PRN Saman Gonzalez MD    650 mg at 10/28/24 1828    atorvastatin tablet 40 mg  40 mg Per OG tube Daily SiobhanacesunshineBreannaconrad SIMMONS, NP   40 mg at 10/30/24 0820    dextrose 10 % infusion   Intravenous Continuous PRN Dolores Moore,         dextrose 10% bolus 125 mL 125 mL  12.5 g Intravenous PRN Dolores Moore,         dextrose 10% bolus 250 mL 250 mL  25 g Intravenous PRN Dolores Moore, DO        famotidine tablet 20 mg  20 mg Per OG tube Daily Saman Gonzalez MD   20 mg at 10/30/24 0820    glucagon (human recombinant) injection 1 mg  1 mg Intramuscular PRN Dolores Moore,         heparin (porcine) injection 5,000 Units  5,000 Units Subcutaneous Q8H Khris Cote MD   5,000 Units at 10/31/24 0631    hydrALAZINE injection 10 mg  10 mg Intravenous Q4H PRN Dolores Moore, DO   10 mg at 10/26/24 1832    insulin aspart U-100 pen 0-10 Units  0-10 Units Subcutaneous Q4H PRN Dolores Moore, DO   4 Units at 10/31/24 1215    labetalol 20 mg/4 mL (5 mg/mL) IV syring  10 mg Intravenous Q4H PRN Dolores Moore, DO   10 mg at 10/31/24 1049    lacosamide tablet 200 mg  200 mg Per OG tube Q12H Zheng Sykes MD   200 mg at 10/31/24 0832    levetiracetam 500 mg/5 mL (5 mL) liquid Soln 750 mg  750 mg Per OG tube BID Dolores Moore,         magnesium oxide split tablet 800 mg  800 mg Per OG tube PRN Radha Yan, TIRSO        magnesium oxide split tablet 800 mg  800 mg Per OG tube PRN Radha Yan, TIRSO        ondansetron injection 4 mg  4 mg Intravenous Q8H PRN Angel Nunez PA-C        potassium bicarbonate disintegrating tablet 35 mEq  35 mEq Per OG tube PRN Radha Yan, TIRSO        potassium bicarbonate disintegrating tablet 50 mEq  50 mEq Per OG tube PRN Radha Yan, TIRSO        potassium bicarbonate disintegrating tablet 60 mEq  60 mEq Per OG tube PRN Radha Yan, TIRSO        potassium, sodium phosphates 280-160-250 mg packet 2 packet  2 packet Per OG tube PRN Radha Yan PA-C        potassium, sodium phosphates  280-160-250 mg packet 2 packet  2 packet Per OG tube PRN Radha Yan PA-C   2 packet at 10/23/24 1220    potassium, sodium phosphates 280-160-250 mg packet 2 packet  2 packet Per OG tube PRN Radha Yan PA-C        silodosin capsule 4 mg  4 mg Per OG tube Daily Radha Yan PA-C   4 mg at 10/30/24 0820    sodium chloride 3% nebulizer solution 4 mL  4 mL Nebulization Q6H Dolores Moore DO   4 mL at 10/31/24 1220     Facility-Administered Medications Ordered in Other Encounters   Medication Dose Route Frequency Provider Last Rate Last Admin    ceFAZolin injection   Intravenous PRN Jennifer Ken CRNA   2 g at 10/31/24 1359    midazolam injection   Intravenous PRN Jennifer Ken CRNA   1 mg at 10/31/24 1327    rocuronium injection   Intravenous PRN Jennifer Ken CRNA   50 mg at 10/31/24 1327    sodium chloride 0.9% infusion   Intravenous Continuous PRN Jennifer Ken CRNA   New Bag at 10/31/24 1334     Continuous Infusions:   D10W   Intravenous Continuous PRN           Review of Systems  Objective:     Vital Signs (Most Recent):  Temp: 98.6 °F (37 °C) (10/31/24 1102)  Pulse: 81 (10/31/24 1302)  Resp: 17 (10/31/24 1302)  BP: (!) 160/75 (10/31/24 1302)  SpO2: 100 % (10/31/24 1302) Vital Signs (24h Range):  Temp:  [98.3 °F (36.8 °C)-98.6 °F (37 °C)] 98.6 °F (37 °C)  Pulse:  [] 81  Resp:  [14-24] 17  SpO2:  [93 %-100 %] 100 %  BP: (117-164)/(58-90) 160/75     Weight: 83.5 kg (184 lb)  Body mass index is 27.98 kg/m².     Physical Exam  Constitutional:       General: He is not in acute distress.     Appearance: He is not diaphoretic.      Interventions: He is intubated.   HENT:      Head: Normocephalic.      Comments: EEG in place  Eyes:      General: No scleral icterus.        Right eye: No discharge.         Left eye: No discharge.      Conjunctiva/sclera: Conjunctivae normal.      Pupils: Pupils are equal, round, and reactive to light.   Cardiovascular:      Rate and Rhythm: Normal rate.    Pulmonary:      Effort: Pulmonary effort is normal. No respiratory distress. He is intubated.      Comments: Intubated  Skin:     General: Skin is warm and dry.   Psychiatric:      Comments: Unable to assess            NEUROLOGICAL EXAMINATION:     CRANIAL NERVES     CN III, IV, VI   Pupils are equal, round, and reactive to light.       Comatose   AQUILINO OU   Corneals intact  No spontaneous eye opening   Face symmetric   Tongue midline   Minimal triple flexion to BLE    Exam findings to suggest seizures:  Myoclonus - no   eye twitching - no   Nystagmus - no   gaze deviation - no   waxy rigidity - no        Significant Labs: All pertinent lab results from the past 24 hours have been reviewed.    Significant Studies: I have reviewed all pertinent imaging results/findings within the past 24 hours.  Assessment and Plan:     * Focal seizures  2/2 R IPH s/p R supraorbital craniotomy for evacuation 10/18 and R craniotomy for SDH evacuation on 10/19  84M PMHx of HTN, HLD, DM2, CAD s/p CABG x2, Afib on Coumadin, ILD on home O2, HFrEF admitted to [facility] as transfer from OSH For evaluation and management of large traumatic R frontal IPH now s/p R supraorbital craniotomy for hematoma evacuation 10/18 and R craniotomy for evacuation of post-op subdural hematoma 10/19. EEG initiated 10/21 showing right hemispheric subclinical seizures.    Recommendations:  - Discontinue vEEG monitoring  - Recommend to renally dose Levetiracetam to 750 mg BID  - Recommend to continue Lacosamide 200 mg BID  - Clobazam discontinued 10/30  - Propofol weaned off 10/28  - Avoid agents that lower seizure threshold  - Management of infectious/metabolic abnormalities per NCC  - Seizure precautions      Discussed plan of care with NCC team. No family at bedside. Will sign off, please call with questions.    Traumatic right-sided intracerebral hemorrhage without loss of consciousness  SDH  ICH  Transfer from OSH for evaluation and management of large traumatic  R frontal IPH now s/p R supraorbital craniotomy for hematoma evacuation 10/18 and R craniotomy for evacuation of post-op subdural hematoma 10/19  - Drain removal 10/25 per NSGY  - Management per NSGY, [facility]    Chronic kidney disease, stage 3a  - Chronic and stable  - Management per [facility]    ILD (interstitial lung disease)  - Chronic, on home O2 as outpatient  - Currently intubated   - Vent management per [facility]    Persistent atrial fibrillation  - On Warfarin as outpatient, on hold in setting of acute IPH  - Management per [facility]    Major depressive disorder, recurrent episode, mild  - Chronic  - SSRI held per NCC  - Management per [facility]        VTE Risk Mitigation (From admission, onward)           Ordered     heparin (porcine) injection 5,000 Units  Every 8 hours         10/22/24 1121     IP VTE HIGH RISK PATIENT  Once         10/17/24 2326     Place sequential compression device  Until discontinued         10/17/24 2326     Reason for No Pharmacological VTE Prophylaxis  Once        Question:  Reasons:  Answer:  Active Bleeding    10/17/24 2326                    Brigitte Carrion PA-C  Neurology-Epilepsy  [facility] - [facility]  Staff: Dr. Trammell

## 2024-10-31 NOTE — PROGRESS NOTES
[facility]  General Surgery  Progress Note    Subjective:     History of Present Illness:  85 yo M with CHF, Afib on coumadin, and DM here after a traumatic ICH. He has had significant seizure activity and has remained intubated due to his mental status. A goals of care meeting was held with his daughters who wish to pursue tracheostomy and enteral access creation.    Post-Op Info:  Procedure(s) (LRB):  CRANIOTOMY, FOR SUBDURAL HEMATOMA EVACUATION (Right)   13 Days Post-Op     Interval History: NAEON. Afebrile. HDS. Minimal vent settings. TF held. To OR today for trach, PEG      Medications:  Continuous Infusions:   D10W   Intravenous Continuous PRN         Scheduled Meds:   atorvastatin  40 mg Per OG tube Daily    famotidine  20 mg Per OG tube Daily    heparin (porcine)  5,000 Units Subcutaneous Q8H    lacosamide  200 mg Per OG tube Q12H    levetiracetam  1,500 mg Per OG tube BID    silodosin  4 mg Per OG tube Daily     PRN Meds:  Current Facility-Administered Medications:     acetaminophen, 650 mg, Per OG tube, Q6H PRN    D10W, , Intravenous, Continuous PRN    dextrose 10%, 12.5 g, Intravenous, PRN    dextrose 10%, 25 g, Intravenous, PRN    fentaNYL, 50 mcg, Intravenous, Q1H PRN    glucagon (human recombinant), 1 mg, Intramuscular, PRN    hydrALAZINE, 10 mg, Intravenous, Q4H PRN    insulin aspart U-100, 0-10 Units, Subcutaneous, Q4H PRN    labetalol, 10 mg, Intravenous, Q4H PRN    magnesium oxide, 800 mg, Per OG tube, PRN    magnesium oxide, 800 mg, Per OG tube, PRN    ondansetron, 4 mg, Intravenous, Q8H PRN    potassium bicarbonate, 35 mEq, Per OG tube, PRN    potassium bicarbonate, 50 mEq, Per OG tube, PRN    potassium bicarbonate, 60 mEq, Per OG tube, PRN    potassium, sodium phosphates, 2 packet, Per OG tube, PRN    potassium, sodium phosphates, 2 packet, Per OG tube, PRN    potassium, sodium phosphates, 2 packet, Per OG tube, PRN     Review of patient's allergies indicates:   Allergen  Reactions    Aricept odt [donepezil] Diarrhea and Nausea And Vomiting    Codeine Nausea Only     weak    Ranexa [ranolazine]      Objective:     Vital Signs (Most Recent):  Temp: 98.3 °F (36.8 °C) (10/31/24 0702)  Pulse: 87 (10/31/24 0702)  Resp: 18 (10/31/24 0702)  BP: 139/74 (10/31/24 0702)  SpO2: 97 % (10/31/24 0702) Vital Signs (24h Range):  Temp:  [97.2 °F (36.2 °C)-98.6 °F (37 °C)] 98.3 °F (36.8 °C)  Pulse:  [] 87  Resp:  [14-24] 18  SpO2:  [93 %-100 %] 97 %  BP: (114-164)/(57-90) 139/74     Weight: 83.5 kg (184 lb)  Body mass index is 27.98 kg/m².    Intake/Output - Last 3 Shifts         10/29 0700  10/30 0659 10/30 0700  10/31 0659 10/31 0700  11/01 0659    I.V. (mL/kg)       NG/GT 1170 1020     IV Piggyback 530.6      Total Intake(mL/kg) 1700.6 (20.4) 1020 (12.2)     Urine (mL/kg/hr) 1675 (0.8) 1785 (0.9)     Stool  0     Total Output 1675 1785     Net +25.6 -765            Stool Occurrence  1 x              Physical Exam  Vitals and nursing note reviewed.   Constitutional:       General: He is not in acute distress.     Appearance: He is ill-appearing.   HENT:      Head:      Comments: EEG cap     Nose: Nose normal.   Cardiovascular:      Rate and Rhythm: Normal rate.   Pulmonary:      Effort: Pulmonary effort is normal. No respiratory distress.      Comments: Vent Mode: Spont  Oxygen Concentration (%):  [40] 40  Resp Rate Total:  [13 br/min-22 br/min] 17 br/min  Vt Set:  [500 mL] 500 mL  PEEP/CPAP:  [5 cmH20] 5 cmH20  Pressure Support:  [7 cmH20] 7 cmH20  Mean Airway Pressure:  [7.5 cmH20-10 cmH20] 7.5 cmH20  Abdominal:      General: There is no distension.      Palpations: Abdomen is soft.      Tenderness: There is no abdominal tenderness. There is no guarding.   Musculoskeletal:         General: No deformity.   Skin:     General: Skin is warm and dry.          Significant Labs:  I have reviewed all pertinent lab results within the past 24 hours.    Significant Diagnostics:  I have reviewed all  pertinent imaging results/findings within the past 24 hours.  Assessment/Plan:     Intracranial hemorrhage  83 yo M with CHF, Afib on coumadin, and DM here after a traumatic ICH. He has had a prolonged course of mechanical ventilation and his mental status does not allow for trials of extubation. He will also need enteral access.    - NPO, hold tube feeds  - To OR today for trach, PEG  - Informed consent obtained   - Okay for DVT ppx  - Remainder of care per primary team  - Please contact general surgery with any questions, concerns, or clinical status changes      Case discussed with Dr. Garcia.      ARLIN Martínez-C  General Surgery  [facility]

## 2024-10-31 NOTE — SUBJECTIVE & OBJECTIVE
Interval History: No seizures on EEG. Patient going for trach/PEG today. No family at bedside on rounds.    Current Facility-Administered Medications   Medication Dose Route Frequency Provider Last Rate Last Admin    acetaminophen tablet 650 mg  650 mg Per OG tube Q6H PRN Saman Gonzalez MD   650 mg at 10/28/24 1828    atorvastatin tablet 40 mg  40 mg Per OG tube Daily Christina Em NP   40 mg at 10/30/24 0820    dextrose 10 % infusion   Intravenous Continuous PRN Dolores Moore, DO        dextrose 10% bolus 125 mL 125 mL  12.5 g Intravenous PRN Dolores Moore, DO        dextrose 10% bolus 250 mL 250 mL  25 g Intravenous PRN Dolores Moore, DO        famotidine tablet 20 mg  20 mg Per OG tube Daily Saman Gonzalez MD   20 mg at 10/30/24 0820    glucagon (human recombinant) injection 1 mg  1 mg Intramuscular PRN Dolores Moore, DO        heparin (porcine) injection 5,000 Units  5,000 Units Subcutaneous Q8H Khris Cote MD   5,000 Units at 10/31/24 0631    hydrALAZINE injection 10 mg  10 mg Intravenous Q4H PRN Dolores Moore, DO   10 mg at 10/26/24 1832    insulin aspart U-100 pen 0-10 Units  0-10 Units Subcutaneous Q4H PRN Dolores Moore, DO   4 Units at 10/31/24 1215    labetalol 20 mg/4 mL (5 mg/mL) IV syring  10 mg Intravenous Q4H PRN Dolores Moore, DO   10 mg at 10/31/24 1049    lacosamide tablet 200 mg  200 mg Per OG tube Q12H Zheng Sykes MD   200 mg at 10/31/24 0832    levetiracetam 500 mg/5 mL (5 mL) liquid Soln 750 mg  750 mg Per OG tube BID Dolores Moore, DO        magnesium oxide split tablet 800 mg  800 mg Per OG tube PRN Radha Yan PA-C        magnesium oxide split tablet 800 mg  800 mg Per OG tube PRN Radha Yan PA-C        ondansetron injection 4 mg  4 mg Intravenous Q8H PRN RodrigokalAngel PA-C        potassium bicarbonate disintegrating tablet 35 mEq  35 mEq Per OG tube PRN Radha Yan, TIRSO        potassium bicarbonate disintegrating tablet 50 mEq  50 mEq Per OG  tube PRN Radha Yan PA-C        potassium bicarbonate disintegrating tablet 60 mEq  60 mEq Per OG tube PRN Radha Yan PA-C        potassium, sodium phosphates 280-160-250 mg packet 2 packet  2 packet Per OG tube PRN Radha Yan PA-C        potassium, sodium phosphates 280-160-250 mg packet 2 packet  2 packet Per OG tube PRN Radha Yan PA-C   2 packet at 10/23/24 1220    potassium, sodium phosphates 280-160-250 mg packet 2 packet  2 packet Per OG tube PRN Radha Yan PA-C        silodosin capsule 4 mg  4 mg Per OG tube Daily Radha Yan PA-C   4 mg at 10/30/24 0820    sodium chloride 3% nebulizer solution 4 mL  4 mL Nebulization Q6H Dolores Moore DO   4 mL at 10/31/24 1220     Facility-Administered Medications Ordered in Other Encounters   Medication Dose Route Frequency Provider Last Rate Last Admin    ceFAZolin injection   Intravenous PRN Jennifer Ken CRNA   2 g at 10/31/24 1359    midazolam injection   Intravenous PRN Jennifer Ken CRNA   1 mg at 10/31/24 1327    rocuronium injection   Intravenous PRN Jennifer Ken CRNA   50 mg at 10/31/24 1327    sodium chloride 0.9% infusion   Intravenous Continuous PRN Jennifer Ken CRNA   New Bag at 10/31/24 1334     Continuous Infusions:   D10W   Intravenous Continuous PRN           Review of Systems  Objective:     Vital Signs (Most Recent):  Temp: 98.6 °F (37 °C) (10/31/24 1102)  Pulse: 81 (10/31/24 1302)  Resp: 17 (10/31/24 1302)  BP: (!) 160/75 (10/31/24 1302)  SpO2: 100 % (10/31/24 1302) Vital Signs (24h Range):  Temp:  [98.3 °F (36.8 °C)-98.6 °F (37 °C)] 98.6 °F (37 °C)  Pulse:  [] 81  Resp:  [14-24] 17  SpO2:  [93 %-100 %] 100 %  BP: (117-164)/(58-90) 160/75     Weight: 83.5 kg (184 lb)  Body mass index is 27.98 kg/m².     Physical Exam  Constitutional:       General: He is not in acute distress.     Appearance: He is not diaphoretic.      Interventions: He is intubated.   HENT:      Head: Normocephalic.       Comments: EEG in place  Eyes:      General: No scleral icterus.        Right eye: No discharge.         Left eye: No discharge.      Conjunctiva/sclera: Conjunctivae normal.      Pupils: Pupils are equal, round, and reactive to light.   Cardiovascular:      Rate and Rhythm: Normal rate.   Pulmonary:      Effort: Pulmonary effort is normal. No respiratory distress. He is intubated.      Comments: Intubated  Skin:     General: Skin is warm and dry.   Psychiatric:      Comments: Unable to assess            NEUROLOGICAL EXAMINATION:     CRANIAL NERVES     CN III, IV, VI   Pupils are equal, round, and reactive to light.       Comatose   AQUILINO OU   Corneals intact  No spontaneous eye opening   Face symmetric   Tongue midline   Minimal triple flexion to BLE    Exam findings to suggest seizures:  Myoclonus - no   eye twitching - no   Nystagmus - no   gaze deviation - no   waxy rigidity - no        Significant Labs: All pertinent lab results from the past 24 hours have been reviewed.    Significant Studies: I have reviewed all pertinent imaging results/findings within the past 24 hours.

## 2024-10-31 NOTE — BRIEF OP NOTE
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]  Obstructive sleep apnea treated with BiPAP [G47.33]    Post-op Diagnosis:  Post-Op Diagnosis Codes:     * Intracranial hemorrhage [I62.9]     * Subdural hematoma [S06.5XAA]     * ILD (interstitial lung disease) [J84.9]     * Chronic combined systolic and diastolic heart failure [I50.42]     * Obstructive sleep apnea treated with BiPAP [G47.33]    Procedure(s) (LRB):  EGD, WITH PEG TUBE INSERTION (N/A)  CREATION, TRACHEOSTOMY (N/A)    Anesthesia: Choice    Implants:  * No implants in log *    Operative Findings: Open tracheostomy using 8-0 Shiley trach and EGD with 20Fr PEG tube at 2.5 cm at skin. Non-bleeding duodenal ulcers noted. Gastric polyps noted as well on EGD.    Estimated Blood Loss: Minimal    Estimated Blood Loss has been documented.         Specimens:   Specimen (24h ago, onward)      None            [id number]

## 2024-10-31 NOTE — ASSESSMENT & PLAN NOTE
83M PMHx of HTN, HLD, DM2, CAD s/p CABG x2, Afib on Coumadin, ILD on 4L of O2, HFrEF (45%), presenting as a transfer for a large traumatic R frontal IPH reversed with Kcentra and vitamin K with repeat INR 1.2.    Admit to [facility]  q1h neuro checks, vitals, and I&O's  CBC, CMP, mag, and phos daily   Coags, TSH, A1c, lipid panel, UA  Echo, EKG, CXR  SBP <160  Na goals > 135  NSGY signed off  SCDs; heparin for VTE prophylaxis  PT/OT/SLP when extubated  Trach/PEG today, RUE intermittently with extensor posturing, an improvement

## 2024-10-31 NOTE — ASSESSMENT & PLAN NOTE
83 yo M with CHF, Afib on coumadin, and DM here after a traumatic ICH. He has had a prolonged course of mechanical ventilation and his mental status does not allow for trials of extubation. He will also need enteral access.    - NPO, hold tube feeds  - To OR today for trach, PEG  - Informed consent obtained   - Okay for DVT ppx  - Remainder of care per primary team  - Please contact general surgery with any questions, concerns, or clinical status changes

## 2024-10-31 NOTE — ASSESSMENT & PLAN NOTE
Initial presentation c/f HHS w glucose > 600. Beta hydroxybutyrate and urine ketones negative. Serum CO2 22. Given SQ insulin at OSH.  [place] [facility] [place] A1c 9.9% on admit.     Insulin held today as patient is NPO prior to trach/PEG today.    Patient's FSGs are uncontrolled due to hyperglycemia on current medication regimen.  Last A1c reviewed-   Lab Results   Component Value Date    LABA1C 6.8 10/09/2017    HGBA1C 9.9 (H) 10/17/2024     Most recent fingerstick glucose reviewed-   Recent Labs   Lab 10/31/24  0017 10/31/24  0347 10/31/24  0759 10/31/24  1213   POCTGLUCOSE 100 127* 212* 232*       Current correctional scale  Medium  Increase anti-hyperglycemic dose as follows-   Antihyperglycemics (From admission, onward)      Start     Stop Route Frequency Ordered    10/25/24 1133  insulin aspart U-100 pen 0-10 Units         -- SubQ Every 4 hours PRN 10/25/24 1036    10/22/24 1400  insulin regular in 0.9 % NaCl 100 unit/100 mL (1 unit/mL) infusion        Question Answer Comment   Insulin Rate Adjustment (DO NOT MODIFY ANSWER) [email or web address]    Enter initial dose from Infusion Protocol Chart (Units/hr): 1.5        10/25/24 1431 IV Continuous 10/22/24 1252    10/21/24 1145  insulin regular in 0.9 % NaCl 100 unit/100 mL (1 unit/mL) infusion        Question:  Enter initial dose from Infusion Protocol Chart (Units/hr):  Answer:  1.65    10/21/24 1343 IV Continuous 10/21/24 1042          Hold Oral hypoglycemics while patient is in the hospital.

## 2024-10-31 NOTE — PLAN OF CARE
"[facility] Care Plan    POC reviewed with Percy Ramirez and family at 0300. Patient verbalized understanding. Questions and concerns addressed. No acute events today. Pt progressing toward goals. Will continue to monitor. See below and flowsheets for full assessment and VS info.     - TREG planned for today  - TF held @ midnight      Is this a stroke patient? yes- Stroke booklet reviewed with patient and family, risk factors identified for patient and stroke booklet remains at bedside for ongoing education.     Care individualization: Lightweight blankets and television    Neuro:  Federico Coma Scale  Best Eye Response: 1-->(E1) none  Best Motor Response: 3-->(M3) flexion to pain  Best Verbal Response: 1-->(V1) none  Federico Coma Scale Score: 5  Assessment Qualifiers: patient intubated, no eye obstruction present  Pupil PERRLA: yes     24 hr Temp:  [97.2 °F (36.2 °C)-98.6 °F (37 °C)]     CV:   Rhythm: atrial rhythm  BP goals:   SBP < 160  MAP > 65    Resp:      Vent Mode: Spont  Set Rate: 16 BPM  Oxygen Concentration (%): 40  Vt Set: 500 mL  PEEP/CPAP: 5 cmH20  Pressure Support: 7 cmH20    Plan: trach/PEG discussions    GI/:     Diet/Nutrition Received: tube feeding  Last Bowel Movement: 10/30/24  Voiding Characteristics: urethral catheter (bladder)    Intake/Output Summary (Last 24 hours) at 10/31/2024 0543  Last data filed at 10/31/2024 0002  Gross per 24 hour   Intake 1020 ml   Output 1535 ml   Net -515 ml     Unmeasured Output  Urine Occurrence: 1  Stool Occurrence: 1  Pad Count: 1    Labs/Accuchecks:  Recent Labs   Lab 10/31/24  0347 10/31/24  0435 10/31/24  0447   WBC 12.07  --   --    RBC 2.62*  --   --    HGB 7.2*  --   --    HCT 24.2*   < > 22*     --   --     < > = values in this interval not displayed.      Recent Labs   Lab 10/31/24  0347      K 5.0   CO2 26      BUN 44*   CREATININE 1.4   ALKPHOS 87   ALT 16   AST 29   BILITOT 0.3    No results for input(s): "PROTIME", "INR", "APTT", " ""HEPANTIXA" in the last 168 hours. No results for input(s): "CPK", "CPKMB", "TROPONINI", "MB" in the last 168 hours.    Electrolytes: N/A - electrolytes WDL  Accuchecks: Q4H    Gtts:   D10W   Intravenous Continuous PRN           LDA/Wounds:    Mykel Risk Assessment  Sensory Perception: 2-->very limited  Moisture: 3-->occasionally moist  Activity: 1-->bedfast  Mobility: 1-->completely immobile  Nutrition: 3-->adequate  Friction and Shear: 2-->potential problem  Mykel Score: 12  Is your mykel score 12 or less? yes heel boots on    Nurses Note -- 4 Eyes    Is there altered skin present?  No  Second RN/Staff Member:  RADHA Corona       Problem: Fall Injury Risk  Goal: Absence of Fall and Fall-Related Injury  Outcome: Progressing     Problem: Adult Inpatient Plan of Care  Goal: Plan of Care Review  Outcome: Progressing  Goal: Patient-Specific Goal (Individualized)  Outcome: Progressing  Goal: Absence of Hospital-Acquired Illness or Injury  Outcome: Progressing  Goal: Optimal Comfort and Wellbeing  Outcome: Progressing  Goal: Readiness for Transition of Care  Outcome: Progressing     Problem: Stroke, Intracerebral Hemorrhage  Goal: Optimal Coping  Outcome: Progressing  Goal: Effective Bowel Elimination  Outcome: Progressing  Goal: Optimal Cerebral Tissue Perfusion  Outcome: Progressing  Goal: Optimal Cognitive Function  Outcome: Progressing  Goal: Effective Communication Skills  Outcome: Progressing  Goal: Optimal Functional Ability  Outcome: Progressing  Goal: Optimal Nutrition Intake  Outcome: Progressing  Goal: Optimal Pain Control and Function  Outcome: Progressing  Goal: Effective Oxygenation and Ventilation  Outcome: Progressing  Goal: Improved Sensorimotor Function  Outcome: Progressing  Goal: Safe and Effective Swallow  Outcome: Progressing  Goal: Effective Urinary Elimination  Outcome: Progressing     "

## 2024-10-31 NOTE — PROGRESS NOTES
[facility] - Neuro Critical Care  Neurocritical Care  Progress Note    Admit Date: 10/17/2024  Service Date: 10/31/2024  Length of Stay: 14    Subjective:     Chief Complaint: Traumatic right-sided intracerebral hemorrhage without loss of consciousness    History of Present Illness: Percy Ramirez is a 83M PMHx of HTN, HLD, DM2, CAD s/p CABG x2, Afib on Coumadin, ILD on 4L of O2, HFrEF (45%), presenting as a transfer for a large traumatic R frontal IPH. Pt initially presented to [facility] ED yesterday ago after mechanical fall and was found to have 4mm parafalcine SDH. Repeat CTH was stable. Pt was discharged home on keppra and instructed to hold his blood thinners. On 10/17/24 pt was lethargic and fell. He was brought back to  ED. Blood glucose was >600 and he was febrile (101F). CTH revealed 5cm R frontal IPH with 7mm MLS. Kcentra, 10mg vitamin K, and 3% HTS bolus were given. He was transferred to [facility] for higher level of care.     Hospital Course: 10/19/2024: POD #1 from both his MI LS as well as his right subdural/intraparenchymal hemorrhage evacuation.  He is still intubated and although sedation is off, he is minimally responsive. Coreg doubled. NG tube placed and tube feeds started. Still on insulin drip.  10/20/2024: POD #2 s/p crani for SDH evacuation. Leukocytosis noted post-op, no accompanying fever/chills. Straight cath x1. Tachycardia responding to IVF boluses. Tube feeds started, will titrate to goal. Insulin gtt continued, plans to transition to subQ tomorrow.  10/21/2021: POD#3. EEG placed this morning, revealing multiple r-sided seizures. Keppra load completed and maintenance dose increased. Concern for LUE no longer WD, stat head CT showing new/increased hyperdensity in resection bed w/o worsening mass effect or MLS. 1L LR given for LEANDRO. Will continue to monitor Na.   10/22/24: POD#4. Addl seizures despite keppra load. Vimpat and propofol started yesterday with significant reduction in number.  1U PRBC transfused overnight. Required levo for short period of time after initiation of propofol.  10/23/24: POD#5.  Patient continues to be hyponatremic.  Free water volume increased.  Patient with total of 11 seizures yesterday.  Vimpat and propofol dosing increased with improvement.  10/24/24: POD#6. NGT adjusted overnight. No additional seizures on current AEM regimen. Platelets and Hgb decreasing, will order peripheral smear to assess for HIT  10/25/2024 POD#7. Addl seizure, Onfi added to AEM regimen. Drains removed by NSGY. Will attempt to wean propofol tomorrow.   10/26/2024 POD#8. MRI complete yx. Weaning propofol today.   10/27/2024 POD#9. Patient began having seizures again overnight following cessation of propofol gtt. Resumed propofol at 10cc/hr for now, increased Keppra dose, and plan to attempt to wean again tomorrow. Persistent hyperglycemia on tube feeds, optimizing insulin regimen. Leukocytosis pending respiratory cultures.  10/28/2024 POD10. No addl seizures overnight. Propofol turned off this morning. Clobazam dosing decreased as well. Resp cultures negative, but will obtain blood and urine cultures. Cont abx. NSGY signing off.   10/29/2024 POD11. No seizures. Clobazam down to 10mg QHS. Infiltrate developing RLL, sputum culture likely due to colonization of resp tract per ID.   10/30/2024 POD12. No seizures. Clobazam discontinued. Abx discontinued, as no significant sputum production and resp status is stable. Discussion with family today - trach/PEG likely tomorrow.   10/31/2024 POD13. Single apneic episode yx evening with no EEG correlation. Spontaneously resolved. To OR today for trach/PEG. EEG cap to be removed.     Interval History:      Yesterday evening, had a single apneic episode with spontaneous recovery. No EEG correlation. No other acute overnight events. No seizures per epilepsy.     Objective:     Vitals:  Temp: 98.6 °F (37 °C)  Pulse: 81  Rhythm: atrial rhythm  BP: (!) 150/82  MAP  (mmHg): 100  Resp: 18  SpO2: 100 %  Oxygen Concentration (%): 40  Vent Mode: Spont  Pressure Support: 7 cmH20  PEEP/CPAP: 5 cmH20  Peak Airway Pressure: 13 cmH20  Mean Airway Pressure: 7.8 cmH20  Plateau Pressure: 13 cmH20    Temp  Min: 98.3 °F (36.8 °C)  Max: 98.6 °F (37 °C)  Pulse  Min: 68  Max: 100  BP  Min: 117/59  Max: 164/74  MAP (mmHg)  Min: 81  Max: 117  Resp  Min: 14  Max: 24  SpO2  Min: 93 %  Max: 100 %  Oxygen Concentration (%)  Min: 40  Max: 40    10/30 0701 - 10/31 0700  In: 1020   Out: 1785 [Urine:1785]   Unmeasured Output  Urine Occurrence: 1  Stool Occurrence: 1  Pad Count: 1        Physical Exam  Constitutional:       General: He is not in acute distress.     Appearance: He is not toxic-appearing.      Interventions: He is not intubated.  Cardiovascular:      Comments: Irregularly irregular rate and rhythm  Pulmonary:      Effort: No respiratory distress. He is not intubated.      Breath sounds: Normal breath sounds.   Abdominal:      General: Abdomen is flat. There is no distension.      Palpations: Abdomen is soft.   Musculoskeletal:      Comments: LUE swelling improving   Skin:     General: Skin is warm and dry.   Neurological:      Comments: Triple flexion BLE              Medications:  [id number]    Scheduledatorvastatin, 40 mg, Daily  famotidine, 20 mg, Daily  heparin (porcine), 5,000 Units, Q8H  lacosamide, 200 mg, Q12H  levetiracetam, 750 mg, BID  silodosin, 4 mg, Daily  sodium chloride 3%, 4 mL, Q6H    PRNacetaminophen, 650 mg, Q6H PRN  D10W, , Continuous PRN  dextrose 10%, 12.5 g, PRN  dextrose 10%, 25 g, PRN  glucagon (human recombinant), 1 mg, PRN  hydrALAZINE, 10 mg, Q4H PRN  insulin aspart U-100, 0-10 Units, Q4H PRN  labetalol, 10 mg, Q4H PRN  magnesium oxide, 800 mg, PRN  magnesium oxide, 800 mg, PRN  ondansetron, 4 mg, Q8H PRN  potassium bicarbonate, 35 mEq, PRN  potassium bicarbonate, 50 mEq, PRN  potassium bicarbonate, 60 mEq, PRN  potassium, sodium phosphates, 2 packet,  PRN  potassium, sodium phosphates, 2 packet, PRN  potassium, sodium phosphates, 2 packet, PRN      Today I personally reviewed pertinent medications, lines/drains/airways, laboratory results,   Diet  Diet NPO Except for: Medication  Diet NPO Except for: Medication    Assessment/Plan:     Neuro  * Traumatic right-sided intracerebral hemorrhage without loss of consciousness  83M PMHx of HTN, HLD, DM2, CAD s/p CABG x2, Afib on Coumadin, ILD on 4L of O2, HFrEF (45%), presenting as a transfer for a large traumatic R frontal IPH reversed with Kcentra and vitamin K with repeat INR 1.2.    Admit to NCC  q1h neuro checks, vitals, and I&O's  CBC, CMP, mag, and phos daily   Coags, TSH, A1c, lipid panel, UA  Echo, EKG, CXR  SBP <160  Na goals > 135  NSGY signed off  SCDs; heparin for VTE prophylaxis  PT/OT/SLP when extubated  Trach/PEG today, RUE intermittently with extensor posturing, an improvement    Subdural hematoma  See primary problem    Focal seizures  EEG placed morning of 10/21 due to delay in return to expected LOC  -Multiple seizures throughout the day  -Has been loaded with keppra and vimpat  -Addl seizure evening of 10/24, Onfi added on.   -Current AED regimen:   - Keppra 750 BID   - Vimpat 200 BID  -No seizures 10/31  -Keppra dose reduced to 750mg BID to renally dose; EEG cap removed    Psychiatric  Major depressive disorder, recurrent episode, mild  Restart home SSRI once able to take p.o.    Pulmonary  ILD (interstitial lung disease)  ILD on 4L of O2 at home    SpO2 goal > 88%  Duo nebs and tiotropium  Currently intubated  Will add chest physiotherapy and HTN nebs to help thin secretions    Cardiac/Vascular  Chronic combined systolic and diastolic heart failure  06/29/2024 echo with EF 45%, was previously 30-40%    Repeat echo shows his EF is still 45%  HDS    Persistent atrial fibrillation  In atrial fibrillation on admit  Hold Coumadin in setting of acute intraparenchymal hemorrhage  Heparin subcu for VTE  prophylaxis    Hyperlipidemia LDL goal <100  Lipid panel showed low LDL, low HDL, low total cholesterol  Atorvastatin    Coronary artery disease  S/p CABG x2 in 2004  Patient has reportedly not been on Plavix recently because of his Coumadin  -Will resume home aspirin after trach/PEG today      Benign hypertension with chronic kidney disease, stage III  SBP goal < 160  PRN labetalol and hydralazine   Home meds as needed for HTN      Renal/  LEANDRO (acute kidney injury)  LEANDRO on CKD, Cr 1.8 on initial presentation to ED    Recent Labs     10/29/24  0258 10/30/24  0229 10/31/24  0347   CREATININE 1.6* 1.6* 1.4   EGFRNORACEVR 42.8* 42.8* 50.2*        Plan  - LEANDRO is resolved   - Avoid nephrotoxins and renally dose meds for GFR listed above  - Monitor urine output, serial BMP, and adjust therapy as needed  - Q1H I&Os    Chronic kidney disease, stage 3a  Cr 1.8 on initial presentation to ED, increased to 2.6, LEANDRO resolved    Creatine stable for now. BMP reviewed- noted Estimated Creatinine Clearance: 42.8 mL/min (based on SCr of 1.4 mg/dL). according to latest data. Based on current GFR, CKD stage is stage 3 - GFR 30-59.  Monitor UOP and serial BMP and adjust therapy as needed. Renally dose meds. Avoid nephrotoxic medications and procedures.    Oncology  Leukocytosis  Elevated WBC 13.5 today. Procal elevated at 0.82.  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 Beta hydroxybutyrate and urine ketones negative. Serum CO2 22. Given SQ insulin at OSH.  [place] [facility] . Hb A1c 9.9% on admit.     Insulin held today as patient is NPO prior to trach/PEG  today.    Patient's FSGs are uncontrolled due to hyperglycemia on current medication regimen.  Last A1c reviewed-   Lab Results   Component Value Date    LABA1C 6.8 10/09/2017    HGBA1C 9.9 (H) 10/17/2024     Most recent fingerstick glucose reviewed-   Recent Labs   Lab 10/31/24  0017 10/31/24  0347 10/31/24  0759 10/31/24  1213   POCTGLUCOSE 100 127* 212* 232*       Current correctional scale  Medium  Increase anti-hyperglycemic dose as follows-   Antihyperglycemics (From admission, onward)      Start     Stop Route Frequency Ordered    10/25/24 1133  insulin aspart U-100 pen 0-10 Units         -- SubQ Every 4 hours PRN 10/25/24 1036    10/22/24 1400  insulin regular in 0.9 % NaCl 100 unit/100 mL (1 unit/mL) infusion        Question Answer Comment   Insulin Rate Adjustment (DO NOT MODIFY ANSWER) [email or web address]    Enter initial dose from Infusion Protocol Chart (Units/hr): 1.5        10/25/24 1431 IV Continuous 10/22/24 1252    10/21/24 1145  insulin regular in 0.9 % NaCl 100 unit/100 mL (1 unit/mL) infusion        Question:  Enter initial dose from Infusion Protocol Chart (Units/hr):  Answer:  1.65    10/21/24 1343 IV Continuous 10/21/24 1042          Hold Oral hypoglycemics while patient is in the hospital.      Other  Obstructive sleep apnea treated with BiPAP  Currently intubated.  Daily ABG  Daily CXR          The patient is being Prophylaxed for:  Venous Thromboembolism with: Mechanical or Chemical  Stress Ulcer with: H2B  Ventilator Pneumonia with: chlorhexidine oral care    Activity Orders            Diet NPO Except for: Medication: NPO starting at 10/31 0001    Turn patient starting at 10/18 0000          Full Code    Dolores Moore DO  Neurocritical Care  [facility] - Neuro Critical Care

## 2024-11-01 PROBLEM — E87.5 HYPERKALEMIA: Status: ACTIVE | Noted: 2024-11-01

## 2024-11-01 LAB
ALBUMIN SERPL BCP-MCNC: 1.9 G/DL (ref 3.5–5.2)
ALBUMIN SERPL BCP-MCNC: 1.9 G/DL (ref 3.5–5.2)
ALLENS TEST: ABNORMAL
ALP SERPL-CCNC: 85 U/L (ref 40–150)
ALP SERPL-CCNC: 89 U/L (ref 40–150)
ALT SERPL W/O P-5'-P-CCNC: 13 U/L (ref 10–44)
ALT SERPL W/O P-5'-P-CCNC: 13 U/L (ref 10–44)
ANION GAP SERPL CALC-SCNC: 10 MMOL/L (ref 8–16)
ANION GAP SERPL CALC-SCNC: 11 MMOL/L (ref 8–16)
ANION GAP SERPL CALC-SCNC: 12 MMOL/L (ref 8–16)
ANION GAP SERPL CALC-SCNC: 13 MMOL/L (ref 8–16)
AST SERPL-CCNC: 22 U/L (ref 10–40)
AST SERPL-CCNC: 24 U/L (ref 10–40)
BASOPHILS # BLD AUTO: 0.03 K/UL (ref 0–0.2)
BASOPHILS # BLD AUTO: 0.03 K/UL (ref 0–0.2)
BASOPHILS NFR BLD: 0.2 % (ref 0–1.9)
BASOPHILS NFR BLD: 0.2 % (ref 0–1.9)
BILIRUB SERPL-MCNC: 0.3 MG/DL (ref 0.1–1)
BILIRUB SERPL-MCNC: 0.3 MG/DL (ref 0.1–1)
BUN SERPL-MCNC: 51 MG/DL (ref 8–23)
BUN SERPL-MCNC: 51 MG/DL (ref 8–23)
BUN SERPL-MCNC: 52 MG/DL (ref 8–23)
BUN SERPL-MCNC: 52 MG/DL (ref 8–23)
CALCIUM SERPL-MCNC: 8.6 MG/DL (ref 8.7–10.5)
CALCIUM SERPL-MCNC: 8.7 MG/DL (ref 8.7–10.5)
CALCIUM SERPL-MCNC: 8.7 MG/DL (ref 8.7–10.5)
CALCIUM SERPL-MCNC: 8.8 MG/DL (ref 8.7–10.5)
CHLORIDE SERPL-SCNC: 108 MMOL/L (ref 95–110)
CHLORIDE SERPL-SCNC: 109 MMOL/L (ref 95–110)
CHLORIDE SERPL-SCNC: 109 MMOL/L (ref 95–110)
CHLORIDE SERPL-SCNC: 110 MMOL/L (ref 95–110)
CO2 SERPL-SCNC: 21 MMOL/L (ref 23–29)
CO2 SERPL-SCNC: 23 MMOL/L (ref 23–29)
CO2 SERPL-SCNC: 23 MMOL/L (ref 23–29)
CO2 SERPL-SCNC: 25 MMOL/L (ref 23–29)
CREAT SERPL-MCNC: 1.6 MG/DL (ref 0.5–1.4)
CREAT SERPL-MCNC: 1.6 MG/DL (ref 0.5–1.4)
CREAT SERPL-MCNC: 1.7 MG/DL (ref 0.5–1.4)
CREAT SERPL-MCNC: 1.7 MG/DL (ref 0.5–1.4)
DELSYS: ABNORMAL
DIFFERENTIAL METHOD BLD: ABNORMAL
DIFFERENTIAL METHOD BLD: ABNORMAL
EOSINOPHIL # BLD AUTO: 0.1 K/UL (ref 0–0.5)
EOSINOPHIL # BLD AUTO: 0.1 K/UL (ref 0–0.5)
EOSINOPHIL NFR BLD: 0.5 % (ref 0–8)
EOSINOPHIL NFR BLD: 0.6 % (ref 0–8)
ERYTHROCYTE [DISTWIDTH] IN BLOOD BY AUTOMATED COUNT: 16.4 % (ref 11.5–14.5)
ERYTHROCYTE [DISTWIDTH] IN BLOOD BY AUTOMATED COUNT: 16.7 % (ref 11.5–14.5)
EST. GFR  (NO RACE VARIABLE): 39.8 ML/MIN/1.73 M^2
EST. GFR  (NO RACE VARIABLE): 39.8 ML/MIN/1.73 M^2
EST. GFR  (NO RACE VARIABLE): 42.8 ML/MIN/1.73 M^2
EST. GFR  (NO RACE VARIABLE): 42.8 ML/MIN/1.73 M^2
FIO2: 40
GLUCOSE SERPL-MCNC: 179 MG/DL (ref 70–110)
GLUCOSE SERPL-MCNC: 197 MG/DL (ref 70–110)
GLUCOSE SERPL-MCNC: 230 MG/DL (ref 70–110)
GLUCOSE SERPL-MCNC: 233 MG/DL (ref 70–110)
HCO3 UR-SCNC: 26.9 MMOL/L (ref 24–28)
HCT VFR BLD AUTO: 22.8 % (ref 40–54)
HCT VFR BLD AUTO: 24.9 % (ref 40–54)
HGB BLD-MCNC: 6.5 G/DL (ref 14–18)
HGB BLD-MCNC: 7.5 G/DL (ref 14–18)
IMM GRANULOCYTES # BLD AUTO: 0.38 K/UL (ref 0–0.04)
IMM GRANULOCYTES # BLD AUTO: 0.39 K/UL (ref 0–0.04)
IMM GRANULOCYTES NFR BLD AUTO: 3 % (ref 0–0.5)
IMM GRANULOCYTES NFR BLD AUTO: 3.2 % (ref 0–0.5)
LYMPHOCYTES # BLD AUTO: 0.6 K/UL (ref 1–4.8)
LYMPHOCYTES # BLD AUTO: 0.7 K/UL (ref 1–4.8)
LYMPHOCYTES NFR BLD: 4.8 % (ref 18–48)
LYMPHOCYTES NFR BLD: 5.3 % (ref 18–48)
MAGNESIUM SERPL-MCNC: 2.8 MG/DL (ref 1.6–2.6)
MAGNESIUM SERPL-MCNC: 2.9 MG/DL (ref 1.6–2.6)
MCH RBC QN AUTO: 27.1 PG (ref 27–31)
MCH RBC QN AUTO: 28 PG (ref 27–31)
MCHC RBC AUTO-ENTMCNC: 28.5 G/DL (ref 32–36)
MCHC RBC AUTO-ENTMCNC: 30.1 G/DL (ref 32–36)
MCV RBC AUTO: 93 FL (ref 82–98)
MCV RBC AUTO: 95 FL (ref 82–98)
MODE: ABNORMAL
MONOCYTES # BLD AUTO: 0.6 K/UL (ref 0.3–1)
MONOCYTES # BLD AUTO: 0.7 K/UL (ref 0.3–1)
MONOCYTES NFR BLD: 5.2 % (ref 4–15)
MONOCYTES NFR BLD: 5.3 % (ref 4–15)
NEUTROPHILS # BLD AUTO: 10.3 K/UL (ref 1.8–7.7)
NEUTROPHILS # BLD AUTO: 11 K/UL (ref 1.8–7.7)
NEUTROPHILS NFR BLD: 85.6 % (ref 38–73)
NEUTROPHILS NFR BLD: 86.1 % (ref 38–73)
NRBC BLD-RTO: 0 /100 WBC
NRBC BLD-RTO: 0 /100 WBC
PCO2 BLDA: 41.4 MMHG (ref 35–45)
PEEP: 5
PH SMN: 7.42 [PH] (ref 7.35–7.45)
PHOSPHATE SERPL-MCNC: 5.6 MG/DL (ref 2.7–4.5)
PHOSPHATE SERPL-MCNC: 5.9 MG/DL (ref 2.7–4.5)
PLATELET # BLD AUTO: 217 K/UL (ref 150–450)
PLATELET # BLD AUTO: 233 K/UL (ref 150–450)
PMV BLD AUTO: 10.6 FL (ref 9.2–12.9)
PMV BLD AUTO: 11 FL (ref 9.2–12.9)
PO2 BLDA: 97 MMHG (ref 80–100)
POC BE: 2 MMOL/L
POC SATURATED O2: 98 % (ref 95–100)
POC TCO2: 28 MMOL/L (ref 23–27)
POCT GLUCOSE: 190 MG/DL (ref 70–110)
POCT GLUCOSE: 199 MG/DL (ref 70–110)
POCT GLUCOSE: 209 MG/DL (ref 70–110)
POCT GLUCOSE: 233 MG/DL (ref 70–110)
POCT GLUCOSE: 259 MG/DL (ref 70–110)
POCT GLUCOSE: 262 MG/DL (ref 70–110)
POCT GLUCOSE: 262 MG/DL (ref 70–110)
POCT GLUCOSE: 293 MG/DL (ref 70–110)
POTASSIUM SERPL-SCNC: 4.6 MMOL/L (ref 3.5–5.1)
POTASSIUM SERPL-SCNC: 4.9 MMOL/L (ref 3.5–5.1)
POTASSIUM SERPL-SCNC: 5.2 MMOL/L (ref 3.5–5.1)
POTASSIUM SERPL-SCNC: 5.2 MMOL/L (ref 3.5–5.1)
POTASSIUM SERPL-SCNC: 5.7 MMOL/L (ref 3.5–5.1)
POTASSIUM SERPL-SCNC: 5.8 MMOL/L (ref 3.5–5.1)
POTASSIUM SERPL-SCNC: 5.8 MMOL/L (ref 3.5–5.1)
POTASSIUM SERPL-SCNC: 6.3 MMOL/L (ref 3.5–5.1)
PROT SERPL-MCNC: 6.3 G/DL (ref 6–8.4)
PROT SERPL-MCNC: 6.3 G/DL (ref 6–8.4)
PS: 7
RBC # BLD AUTO: 2.4 M/UL (ref 4.6–6.2)
RBC # BLD AUTO: 2.68 M/UL (ref 4.6–6.2)
SAMPLE: ABNORMAL
SITE: ABNORMAL
SODIUM SERPL-SCNC: 142 MMOL/L (ref 136–145)
SODIUM SERPL-SCNC: 143 MMOL/L (ref 136–145)
SODIUM SERPL-SCNC: 144 MMOL/L (ref 136–145)
SODIUM SERPL-SCNC: 145 MMOL/L (ref 136–145)
SP02: 100
SPONT RATE: 13
WBC # BLD AUTO: 12.02 K/UL (ref 3.9–12.7)
WBC # BLD AUTO: 12.88 K/UL (ref 3.9–12.7)

## 2024-11-01 PROCEDURE — 85025 COMPLETE CBC W/AUTO DIFF WBC: CPT | Mod: 91

## 2024-11-01 PROCEDURE — 99900026 HC AIRWAY MAINTENANCE (STAT)

## 2024-11-01 PROCEDURE — 63600175 PHARM REV CODE 636 W HCPCS

## 2024-11-01 PROCEDURE — 25000003 PHARM REV CODE 250

## 2024-11-01 PROCEDURE — 20000000 HC ICU ROOM

## 2024-11-01 PROCEDURE — 82803 BLOOD GASES ANY COMBINATION: CPT

## 2024-11-01 PROCEDURE — 83735 ASSAY OF MAGNESIUM: CPT

## 2024-11-01 PROCEDURE — 84100 ASSAY OF PHOSPHORUS: CPT | Mod: 91

## 2024-11-01 PROCEDURE — 99900035 HC TECH TIME PER 15 MIN (STAT)

## 2024-11-01 PROCEDURE — 25000242 PHARM REV CODE 250 ALT 637 W/ HCPCS

## 2024-11-01 PROCEDURE — 94640 AIRWAY INHALATION TREATMENT: CPT

## 2024-11-01 PROCEDURE — 94761 N-INVAS EAR/PLS OXIMETRY MLT: CPT

## 2024-11-01 PROCEDURE — 80053 COMPREHEN METABOLIC PANEL: CPT

## 2024-11-01 PROCEDURE — 25000003 PHARM REV CODE 250: Performed by: INTERNAL MEDICINE

## 2024-11-01 PROCEDURE — 63600175 PHARM REV CODE 636 W HCPCS: Mod: JZ,JG

## 2024-11-01 PROCEDURE — 94668 MNPJ CHEST WALL SBSQ: CPT

## 2024-11-01 PROCEDURE — 27200966 HC CLOSED SUCTION SYSTEM

## 2024-11-01 PROCEDURE — 36600 WITHDRAWAL OF ARTERIAL BLOOD: CPT

## 2024-11-01 PROCEDURE — 99291 CRITICAL CARE FIRST HOUR: CPT | Mod: GC,,, | Performed by: INTERNAL MEDICINE

## 2024-11-01 PROCEDURE — 85025 COMPLETE CBC W/AUTO DIFF WBC: CPT

## 2024-11-01 PROCEDURE — 27100171 HC OXYGEN HIGH FLOW UP TO 24 HOURS

## 2024-11-01 PROCEDURE — 80048 BASIC METABOLIC PNL TOTAL CA: CPT | Mod: XB

## 2024-11-01 PROCEDURE — 84100 ASSAY OF PHOSPHORUS: CPT

## 2024-11-01 PROCEDURE — 80053 COMPREHEN METABOLIC PANEL: CPT | Mod: 91

## 2024-11-01 PROCEDURE — 84132 ASSAY OF SERUM POTASSIUM: CPT | Mod: 91

## 2024-11-01 PROCEDURE — 94003 VENT MGMT INPAT SUBQ DAY: CPT

## 2024-11-01 PROCEDURE — 83735 ASSAY OF MAGNESIUM: CPT | Mod: 91

## 2024-11-01 RX ORDER — ALBUTEROL SULFATE 2.5 MG/.5ML
10 SOLUTION RESPIRATORY (INHALATION) ONCE
Status: COMPLETED | OUTPATIENT
Start: 2024-11-01 | End: 2024-11-01

## 2024-11-01 RX ORDER — LEVETIRACETAM 100 MG/ML
750 SOLUTION ORAL 2 TIMES DAILY
Status: DISCONTINUED | OUTPATIENT
Start: 2024-11-01 | End: 2024-11-08 | Stop reason: HOSPADM

## 2024-11-01 RX ORDER — ATORVASTATIN CALCIUM 40 MG/1
40 TABLET, FILM COATED ORAL DAILY
Status: DISCONTINUED | OUTPATIENT
Start: 2024-11-01 | End: 2024-11-08 | Stop reason: HOSPADM

## 2024-11-01 RX ORDER — NAPROXEN SODIUM 220 MG/1
81 TABLET, FILM COATED ORAL DAILY
Status: DISCONTINUED | OUTPATIENT
Start: 2024-11-01 | End: 2024-11-08 | Stop reason: HOSPADM

## 2024-11-01 RX ORDER — SILODOSIN 4 MG/1
4 CAPSULE ORAL DAILY
Status: DISCONTINUED | OUTPATIENT
Start: 2024-11-01 | End: 2024-11-03

## 2024-11-01 RX ORDER — LACOSAMIDE 50 MG/1
200 TABLET ORAL EVERY 12 HOURS
Status: DISCONTINUED | OUTPATIENT
Start: 2024-11-01 | End: 2024-11-08 | Stop reason: HOSPADM

## 2024-11-01 RX ORDER — INSULIN GLARGINE 100 [IU]/ML
20 INJECTION, SOLUTION SUBCUTANEOUS DAILY
Status: DISCONTINUED | OUTPATIENT
Start: 2024-11-01 | End: 2024-11-03

## 2024-11-01 RX ORDER — INSULIN GLARGINE 100 [IU]/ML
10 INJECTION, SOLUTION SUBCUTANEOUS DAILY
Status: DISCONTINUED | OUTPATIENT
Start: 2024-11-01 | End: 2024-11-01

## 2024-11-01 RX ORDER — FAMOTIDINE 20 MG/1
20 TABLET, FILM COATED ORAL DAILY
Status: DISCONTINUED | OUTPATIENT
Start: 2024-11-01 | End: 2024-11-08 | Stop reason: HOSPADM

## 2024-11-01 RX ORDER — INSULIN ASPART 100 [IU]/ML
7 INJECTION, SOLUTION INTRAVENOUS; SUBCUTANEOUS
Status: DISCONTINUED | OUTPATIENT
Start: 2024-11-01 | End: 2024-11-03

## 2024-11-01 RX ORDER — CARVEDILOL 3.12 MG/1
3.12 TABLET ORAL 2 TIMES DAILY
Status: DISCONTINUED | OUTPATIENT
Start: 2024-11-01 | End: 2024-11-07

## 2024-11-01 RX ORDER — ACETAMINOPHEN 325 MG/1
650 TABLET ORAL EVERY 6 HOURS PRN
Status: DISCONTINUED | OUTPATIENT
Start: 2024-11-01 | End: 2024-11-08 | Stop reason: HOSPADM

## 2024-11-01 RX ORDER — DULOXETIN HYDROCHLORIDE 60 MG/1
60 CAPSULE, DELAYED RELEASE ORAL DAILY
Status: DISCONTINUED | OUTPATIENT
Start: 2024-11-01 | End: 2024-11-08 | Stop reason: HOSPADM

## 2024-11-01 RX ADMIN — INSULIN ASPART 2 UNITS: 100 INJECTION, SOLUTION INTRAVENOUS; SUBCUTANEOUS at 12:11

## 2024-11-01 RX ADMIN — LACOSAMIDE 200 MG: 50 TABLET, FILM COATED ORAL at 09:11

## 2024-11-01 RX ADMIN — ATORVASTATIN CALCIUM 40 MG: 40 TABLET, FILM COATED ORAL at 09:11

## 2024-11-01 RX ADMIN — INSULIN ASPART 4 UNITS: 100 INJECTION, SOLUTION INTRAVENOUS; SUBCUTANEOUS at 06:11

## 2024-11-01 RX ADMIN — LEVETIRACETAM 750 MG: 500 SOLUTION ORAL at 08:11

## 2024-11-01 RX ADMIN — LACOSAMIDE 200 MG: 50 TABLET, FILM COATED ORAL at 08:11

## 2024-11-01 RX ADMIN — SILODOSIN 4 MG: 4 CAPSULE ORAL at 09:11

## 2024-11-01 RX ADMIN — ALBUTEROL SULFATE 10 MG: 2.5 SOLUTION RESPIRATORY (INHALATION) at 08:11

## 2024-11-01 RX ADMIN — INSULIN ASPART 7 UNITS: 100 INJECTION, SOLUTION INTRAVENOUS; SUBCUTANEOUS at 08:11

## 2024-11-01 RX ADMIN — INSULIN ASPART 2 UNITS: 100 INJECTION, SOLUTION INTRAVENOUS; SUBCUTANEOUS at 04:11

## 2024-11-01 RX ADMIN — LEVETIRACETAM 750 MG: 500 SOLUTION ORAL at 09:11

## 2024-11-01 RX ADMIN — HEPARIN SODIUM 5000 UNITS: 5000 INJECTION INTRAVENOUS; SUBCUTANEOUS at 09:11

## 2024-11-01 RX ADMIN — INSULIN ASPART 7 UNITS: 100 INJECTION, SOLUTION INTRAVENOUS; SUBCUTANEOUS at 12:11

## 2024-11-01 RX ADMIN — SODIUM ZIRCONIUM CYCLOSILICATE 10 G: 10 POWDER, FOR SUSPENSION ORAL at 02:11

## 2024-11-01 RX ADMIN — LABETALOL HYDROCHLORIDE 10 MG: 5 INJECTION, SOLUTION INTRAVENOUS at 02:11

## 2024-11-01 RX ADMIN — DULOXETINE HYDROCHLORIDE 60 MG: 60 CAPSULE, DELAYED RELEASE ORAL at 12:11

## 2024-11-01 RX ADMIN — SODIUM CHLORIDE SOLN NEBU 3% 4 ML: 3 NEBU SOLN at 01:11

## 2024-11-01 RX ADMIN — INSULIN ASPART 1 UNITS: 100 INJECTION, SOLUTION INTRAVENOUS; SUBCUTANEOUS at 08:11

## 2024-11-01 RX ADMIN — SODIUM CHLORIDE SOLN NEBU 3% 4 ML: 3 NEBU SOLN at 07:11

## 2024-11-01 RX ADMIN — HEPARIN SODIUM 5000 UNITS: 5000 INJECTION INTRAVENOUS; SUBCUTANEOUS at 05:11

## 2024-11-01 RX ADMIN — ASPIRIN 81 MG CHEWABLE TABLET 81 MG: 81 TABLET CHEWABLE at 09:11

## 2024-11-01 RX ADMIN — SODIUM CHLORIDE SOLN NEBU 3% 4 ML: 3 NEBU SOLN at 12:11

## 2024-11-01 RX ADMIN — CARVEDILOL 3.12 MG: 3.12 TABLET, FILM COATED ORAL at 12:11

## 2024-11-01 RX ADMIN — INSULIN ASPART 4 UNITS: 100 INJECTION, SOLUTION INTRAVENOUS; SUBCUTANEOUS at 10:11

## 2024-11-01 RX ADMIN — CARVEDILOL 3.12 MG: 3.12 TABLET, FILM COATED ORAL at 08:11

## 2024-11-01 RX ADMIN — FAMOTIDINE 20 MG: 20 TABLET ORAL at 09:11

## 2024-11-01 RX ADMIN — HEPARIN SODIUM 5000 UNITS: 5000 INJECTION INTRAVENOUS; SUBCUTANEOUS at 02:11

## 2024-11-01 RX ADMIN — INSULIN ASPART 7 UNITS: 100 INJECTION, SOLUTION INTRAVENOUS; SUBCUTANEOUS at 04:11

## 2024-11-01 RX ADMIN — INSULIN GLARGINE 20 UNITS: 100 INJECTION, SOLUTION SUBCUTANEOUS at 10:11

## 2024-11-01 NOTE — SUBJECTIVE & OBJECTIVE
Interval History: NAEON. Afebrile. HDS. POD1 trach, PEG. Vent at 40/5. PEG with 200cc out    Medications:  Continuous Infusions:   D10W   Intravenous Continuous PRN         Scheduled Meds:   atorvastatin  40 mg Per OG tube Daily    famotidine  20 mg Per OG tube Daily    heparin (porcine)  5,000 Units Subcutaneous Q8H    lacosamide  200 mg Per OG tube Q12H    levetiracetam  750 mg Per OG tube BID    silodosin  4 mg Per OG tube Daily    sodium chloride 3%  4 mL Nebulization Q6H     PRN Meds:  Current Facility-Administered Medications:     acetaminophen, 650 mg, Per OG tube, Q6H PRN    D10W, , Intravenous, Continuous PRN    dextrose 10%, 12.5 g, Intravenous, PRN    dextrose 10%, 25 g, Intravenous, PRN    glucagon (human recombinant), 1 mg, Intramuscular, PRN    hydrALAZINE, 10 mg, Intravenous, Q4H PRN    insulin aspart U-100, 0-10 Units, Subcutaneous, Q4H PRN    labetalol, 10 mg, Intravenous, Q4H PRN    magnesium oxide, 800 mg, Per OG tube, PRN    magnesium oxide, 800 mg, Per OG tube, PRN    ondansetron, 4 mg, Intravenous, Q8H PRN    potassium bicarbonate, 35 mEq, Per OG tube, PRN    potassium bicarbonate, 50 mEq, Per OG tube, PRN    potassium bicarbonate, 60 mEq, Per OG tube, PRN    potassium, sodium phosphates, 2 packet, Per OG tube, PRN    potassium, sodium phosphates, 2 packet, Per OG tube, PRN    potassium, sodium phosphates, 2 packet, Per OG tube, PRN     Review of patient's allergies indicates:   Allergen Reactions    Aricept odt [donepezil] Diarrhea and Nausea And Vomiting    Codeine Nausea Only     weak    Ranexa [ranolazine]      Objective:     Vital Signs (Most Recent):  Temp: 98.1 °F (36.7 °C) (11/01/24 0701)  Pulse: 80 (11/01/24 0738)  Resp: 18 (11/01/24 0738)  BP: (!) 142/70 (11/01/24 0701)  SpO2: 100 % (11/01/24 0738) Vital Signs (24h Range):  Temp:  [97.1 °F (36.2 °C)-98.6 °F (37 °C)] 98.1 °F (36.7 °C)  Pulse:  [64-89] 80  Resp:  [14-20] 18  SpO2:  [3 %-100 %] 100 %  BP: (122-179)/(58-86) 142/70      Weight: 83.5 kg (184 lb)  Body mass index is 27.98 kg/m².    Intake/Output - Last 3 Shifts         10/30 0700  10/31 0659 10/31 0700 11/01 0659 11/01 0700 11/02 0659    NG/GT 1020 90     IV Piggyback  150     Total Intake(mL/kg) 1020 (12.2) 240 (2.9)     Urine (mL/kg/hr) 1785 (0.9) 1555 (0.8) 180 (2.6)    Drains  200     Stool 0      Total Output 1785 1755 180    Net -765 -1515 -180           Stool Occurrence 1 x               Physical Exam  Vitals and nursing note reviewed.   Constitutional:       General: He is not in acute distress.  HENT:      Head: Normocephalic.      Nose: Nose normal.   Eyes:      Conjunctiva/sclera: Conjunctivae normal.   Neck:      Comments: Trach without bleeding noted  Cardiovascular:      Rate and Rhythm: Normal rate.   Pulmonary:      Effort: Pulmonary effort is normal. No respiratory distress.      Comments: Vent Mode: Spont  Oxygen Concentration (%):  [40] 40  Resp Rate Total:  [14 br/min-23 br/min] 16 br/min  PEEP/CPAP:  [5 cmH20] 5 cmH20  Pressure Support:  [7 cmH20] 7 cmH20  Mean Airway Pressure:  [7.4 cmH20-8.1 cmH20] 7.6 cmH20  Abdominal:      General: There is no distension.      Palpations: Abdomen is soft.      Tenderness: There is no guarding or rebound.      Comments: PEG to L abdomen without surrounding erythema or drainage, bilious output in bag   Musculoskeletal:      Right lower leg: No edema.      Left lower leg: No edema.   Skin:     General: Skin is warm and dry.          Significant Labs:  I have reviewed all pertinent lab results within the past 24 hours.  CBC:   Recent Labs   Lab 11/01/24 0052   WBC 12.02   RBC 2.68*   HGB 7.5*   HCT 24.9*      MCV 93   MCH 28.0   MCHC 30.1*     CMP:   Recent Labs   Lab 11/01/24 0052 11/01/24  0511   * 197*   CALCIUM 8.6* 8.7   ALBUMIN 1.9*  --    PROT 6.3  --     142   K 5.7* 5.8*  5.8*   CO2 23 23    108   BUN 52* 51*   CREATININE 1.7* 1.6*   ALKPHOS 89  --    ALT 13  --    AST 22  --    BILITOT  0.3  --        Significant Diagnostics:  I have reviewed all pertinent imaging results/findings within the past 24 hours.

## 2024-11-01 NOTE — ASSESSMENT & PLAN NOTE
83M PMHx of HTN, HLD, DM2, CAD s/p CABG x2, Afib on Coumadin, ILD on 4L of O2, HFrEF (45%), presenting as a transfer for a large traumatic R frontal IPH reversed with Kcentra and vitamin K with repeat INR 1.2.    Admit to NCC  q1h neuro checks, vitals, and I&O's  CBC, CMP, mag, and phos daily   Coags, TSH, A1c, lipid panel, UA  Echo, EKG, CXR  SBP <160  Na goals > 135  NSGY signed off  SCDs; heparin for VTE prophylaxis  PT/OT/SLP when extubated  Trach/PEG yx, neuro exam unchanged at this time

## 2024-11-01 NOTE — SUBJECTIVE & OBJECTIVE
Interval History:      Overnight, patient with EKG changes. Given lokelma and albuterol overnight. Otherwise stable. Tube feeds resumed. Aspirin restarted.    Objective:     Vitals:  Temp: 97.7 °F (36.5 °C)  Pulse: 71  Rhythm: atrial rhythm  BP: (!) 158/79  MAP (mmHg): 110  Resp: 14  SpO2: 100 %  Oxygen Concentration (%): 40  Vent Mode: Spont  Pressure Support: 7 cmH20  PEEP/CPAP: 5 cmH20  Peak Airway Pressure: 13 cmH20  Mean Airway Pressure: 7.3 cmH20  Plateau Pressure: 13 cmH20    Temp  Min: 97.2 °F (36.2 °C)  Max: 98.1 °F (36.7 °C)  Pulse  Min: 65  Max: 92  BP  Min: 106/54  Max: 179/86  MAP (mmHg)  Min: 76  Max: 116  Resp  Min: 12  Max: 29  SpO2  Min: 99 %  Max: 100 %  Oxygen Concentration (%)  Min: 40  Max: 40    10/31 0701 - 11/01 0700  In: 240   Out: 1755 [Urine:1555; Drains:200]   Unmeasured Output  Urine Occurrence: 1  Stool Occurrence: 1  Pad Count: 1        Physical Exam  Constitutional:       General: He is not in acute distress.     Appearance: He is not toxic-appearing.   Cardiovascular:      Comments: Irregularly irregular  Pulmonary:      Effort: No respiratory distress.      Comments: Trach on vent  Abdominal:      General: There is no distension.      Palpations: Abdomen is soft.      Tenderness: There is no abdominal tenderness.   Musculoskeletal:         General: Swelling (UE) present.   Skin:     General: Skin is dry.   Neurological:      Comments: Unarousable          Medications:  [id number]    Scheduledaspirin, 81 mg, Daily  atorvastatin, 40 mg, Daily  carvediloL, 3.125 mg, BID  DULoxetine, 60 mg, Daily  famotidine, 20 mg, Daily  heparin (porcine), 5,000 Units, Q8H  insulin aspart U-100, 7 Units, 6 times per day  insulin glargine U-100, 20 Units, Daily  lacosamide, 200 mg, Q12H  levetiracetam, 750 mg, BID  silodosin, 4 mg, Daily  sodium chloride 3%, 4 mL, Q6H    PRNacetaminophen, 650 mg, Q6H PRN  D10W, , Continuous PRN  dextrose 10%, 12.5 g, PRN  dextrose 10%, 25 g, PRN  glucagon (human  recombinant), 1 mg, PRN  hydrALAZINE, 10 mg, Q4H PRN  insulin aspart U-100, 0-10 Units, Q4H PRN  labetalol, 10 mg, Q4H PRN  ondansetron, 4 mg, Q8H PRN      Today I personally reviewed pertinent medications, lines/drains/airways, laboratory results,     Diet  Diet NPO Except for: Medication  Diet NPO Except for: Medication

## 2024-11-01 NOTE — PLAN OF CARE
"[facility] Care Plan    POC reviewed with Percy Ramirez and family at 0300. Family verbalized understanding. Questions and concerns addressed. No acute events today. Pt progressing toward goals. Will continue to monitor. See below and flowsheets for full assessment and VS info.             Is this a stroke patient? yes- Stroke booklet reviewed with family, risk factors identified for patient and stroke booklet remains at bedside for ongoing education.     Care individualization: treg    Neuro:  [place] Coma Scale  Best Eye Response: 1-->(E1) none  Best Motor Response: 3-->(M3) flexion to pain  Best Verbal Response: 1-->(V1) none  Federico Coma Scale Score: 5  Assessment Qualifiers: patient intubated  Pupil PERRLA: yes     24 hr Temp:  [97.1 °F (36.2 °C)-98.6 °F (37 °C)]     CV:   Rhythm: atrial rhythm  BP goals:   SBP < 160  MAP > 65    Resp:      Vent Mode: Spont  Set Rate: 16 BPM  Oxygen Concentration (%): 40  Vt Set: 500 mL  PEEP/CPAP: 5 cmH20  Pressure Support: 7 cmH20    Plan: trach in place    GI/:     Diet/Nutrition Received: NPO  Last Bowel Movement: 10/30/24  Voiding Characteristics: urethral catheter (bladder)    Intake/Output Summary (Last 24 hours) at 11/1/2024 0558  Last data filed at 11/1/2024 0500  Gross per 24 hour   Intake 240 ml   Output 1755 ml   Net -1515 ml     Unmeasured Output  Urine Occurrence: 1  Stool Occurrence: 1  Pad Count: 1    Labs/Accuchecks:  Recent Labs   Lab 11/01/24  0052   WBC 12.02   RBC 2.68*   HGB 7.5*   HCT 24.9*         Recent Labs   Lab 11/01/24  0052 11/01/24  0511    142   K 5.7* 5.8*  5.8*   CO2 23 23    108   BUN 52* 51*   CREATININE 1.7* 1.6*   ALKPHOS 89  --    ALT 13  --    AST 22  --    BILITOT 0.3  --     No results for input(s): "PROTIME", "INR", "APTT", "HEPANTIXA" in the last 168 hours. No results for input(s): "CPK", "CPKMB", "TROPONINI", "MB" in the last 168 hours.    Electrolytes: N/A - electrolytes WDL  Accuchecks: Q4H    Gtts:   D10W   " Intravenous Continuous PRN           LDA/Wounds:    Mykel Risk Assessment  Sensory Perception: 2-->very limited  Moisture: 3-->occasionally moist  Activity: 1-->bedfast  Mobility: 1-->completely immobile  Nutrition: 3-->adequate  Friction and Shear: 2-->potential problem  Mykel Score: 12  Is your mykel score 12 or less? no    Nurses Note -- 4 Eyes    Is there altered skin present?  No  Second RN/Staff Member:  Christopher RN      Restraints:   Restraint Order  Length of Order: Order good for next 24 hours or when removed.  Date that the current order will : 10/27/24  Time that the current order will : 1623  Order Upon Application: Yes    [facility]

## 2024-11-01 NOTE — PROGRESS NOTES
[facility] - Neuro Critical Care  Neurocritical Care  Progress Note    Admit Date: 10/17/2024  Service Date: 11/01/2024  Length of Stay: 15    Subjective:     Chief Complaint: Traumatic right-sided intracerebral hemorrhage without loss of consciousness    History of Present Illness: Percy Ramirez is a 83M PMHx of HTN, HLD, DM2, CAD s/p CABG x2, Afib on Coumadin, ILD on 4L of O2, HFrEF (45%), presenting as a transfer for a large traumatic R frontal IPH. Pt initially presented to [facility] ED yesterday ago after mechanical fall and was found to have 4mm parafalcine SDH. Repeat CTH was stable. Pt was discharged home on keppra and instructed to hold his blood thinners. On 10/17/24 pt was lethargic and fell. He was brought back to  ED. Blood glucose was >600 and he was febrile (101F). CTH revealed 5cm R frontal IPH with 7mm MLS. Kcentra, 10mg vitamin K, and 3% HTS bolus were given. He was transferred to [facility] for higher level of care.     Hospital Course: 10/19/2024: POD #1 from both his MI LS as well as his right subdural/intraparenchymal hemorrhage evacuation.  He is still intubated and although sedation is off, he is minimally responsive. Coreg doubled. NG tube placed and tube feeds started. Still on insulin drip.  10/20/2024: POD #2 s/p crani for SDH evacuation. Leukocytosis noted post-op, no accompanying fever/chills. Straight cath x1. Tachycardia responding to IVF boluses. Tube feeds started, will titrate to goal. Insulin gtt continued, plans to transition to subQ tomorrow.  10/21/2021: POD#3. EEG placed this morning, revealing multiple r-sided seizures. Keppra load completed and maintenance dose increased. Concern for LUE no longer WD, stat head CT showing new/increased hyperdensity in resection bed w/o worsening mass effect or MLS. 1L LR given for LEANDRO. Will continue to monitor Na.   10/22/24: POD#4. Addl seizures despite keppra load. Vimpat and propofol started yesterday with significant reduction in number.  1U PRBC transfused overnight. Required levo for short period of time after initiation of propofol.  10/23/24: POD#5.  Patient continues to be hyponatremic.  Free water volume increased.  Patient with total of 11 seizures yesterday.  Vimpat and propofol dosing increased with improvement.  10/24/24: POD#6. NGT adjusted overnight. No additional seizures on current AEM regimen. Platelets and Hgb decreasing, will order peripheral smear to assess for HIT  10/25/2024 POD#7. Addl seizure, Onfi added to AEM regimen. Drains removed by NSGY. Will attempt to wean propofol tomorrow.   10/26/2024 POD#8. MRI complete yx. Weaning propofol today.   10/27/2024 POD#9. Patient began having seizures again overnight following cessation of propofol gtt. Resumed propofol at 10cc/hr for now, increased Keppra dose, and plan to attempt to wean again tomorrow. Persistent hyperglycemia on tube feeds, optimizing insulin regimen. Leukocytosis pending respiratory cultures.  10/28/2024 POD10. No addl seizures overnight. Propofol turned off this morning. Clobazam dosing decreased as well. Resp cultures negative, but will obtain blood and urine cultures. Cont abx. NSGY signing off.   10/29/2024 POD11. No seizures. Clobazam down to 10mg QHS. Infiltrate developing RLL, sputum culture likely due to colonization of resp tract per ID.   10/30/2024 POD12. No seizures. Clobazam discontinued. Abx discontinued, as no significant sputum production and resp status is stable. Discussion with family today - trach/PEG likely tomorrow.   10/31/2024 POD13. Single apneic episode yx evening with no EEG correlation. Spontaneously resolved. To OR today for trach/PEG. EEG cap to be removed.   11/1/24 POD14. Hyperkalemia overnight, resolving with lokelma and albuterol 10mg x1.     Interval History:      Overnight, patient with EKG changes. Given lokelma and albuterol overnight. Otherwise stable. Tube feeds resumed. Aspirin restarted.    Objective:     Vitals:  Temp: 97.7  °F (36.5 °C)  Pulse: 71  Rhythm: atrial rhythm  BP: (!) 158/79  MAP (mmHg): 110  Resp: 14  SpO2: 100 %  Oxygen Concentration (%): 40  Vent Mode: Spont  Pressure Support: 7 cmH20  PEEP/CPAP: 5 cmH20  Peak Airway Pressure: 13 cmH20  Mean Airway Pressure: 7.3 cmH20  Plateau Pressure: 13 cmH20    Temp  Min: 97.2 °F (36.2 °C)  Max: 98.1 °F (36.7 °C)  Pulse  Min: 65  Max: 92  BP  Min: 106/54  Max: 179/86  MAP (mmHg)  Min: 76  Max: 116  Resp  Min: 12  Max: 29  SpO2  Min: 99 %  Max: 100 %  Oxygen Concentration (%)  Min: 40  Max: 40    10/31 0701 - 11/01 0700  In: 240   Out: 1755 [Urine:1555; Drains:200]   Unmeasured Output  Urine Occurrence: 1  Stool Occurrence: 1  Pad Count: 1        Physical Exam  Constitutional:       General: He is not in acute distress.     Appearance: He is not toxic-appearing.   Cardiovascular:      Comments: Irregularly irregular  Pulmonary:      Effort: No respiratory distress.      Comments: Trach on vent  Abdominal:      General: There is no distension.      Palpations: Abdomen is soft.      Tenderness: There is no abdominal tenderness.   Musculoskeletal:         General: Swelling (UE) present.   Skin:     General: Skin is dry.   Neurological:      Comments: Unarousable          Medications:  [id number]    Scheduledaspirin, 81 mg, Daily  atorvastatin, 40 mg, Daily  carvediloL, 3.125 mg, BID  DULoxetine, 60 mg, Daily  famotidine, 20 mg, Daily  heparin (porcine), 5,000 Units, Q8H  insulin aspart U-100, 7 Units, 6 times per day  insulin glargine U-100, 20 Units, Daily  lacosamide, 200 mg, Q12H  levetiracetam, 750 mg, BID  silodosin, 4 mg, Daily  sodium chloride 3%, 4 mL, Q6H    PRNacetaminophen, 650 mg, Q6H PRN  D10W, , Continuous PRN  dextrose 10%, 12.5 g, PRN  dextrose 10%, 25 g, PRN  glucagon (human recombinant), 1 mg, PRN  hydrALAZINE, 10 mg, Q4H PRN  insulin aspart U-100, 0-10 Units, Q4H PRN  labetalol, 10 mg, Q4H PRN  ondansetron, 4 mg, Q8H PRN      Today I personally reviewed pertinent  medications, lines/drains/airways, laboratory results,     Diet  Diet NPO Except for: Medication  Diet NPO Except for: Medication    Assessment/Plan:     Neuro  * Traumatic right-sided intracerebral hemorrhage without loss of consciousness  83M PMHx of HTN, HLD, DM2, CAD s/p CABG x2, Afib on Coumadin, ILD on 4L of O2, HFrEF (45%), presenting as a transfer for a large traumatic R frontal IPH reversed with Kcentra and vitamin K with repeat INR 1.2.    Admit to [facility]  q1h neuro checks, vitals, and I&O's  CBC, CMP, mag, and phos daily   Coags, TSH, A1c, lipid panel, UA  Echo, EKG, CXR  SBP <160  Na goals > 135  NSGY signed off  SCDs; heparin for VTE prophylaxis  PT/OT/SLP when extubated  Trach/PEG yx, neuro exam unchanged at this time    Focal seizures  EEG placed morning of 10/21 due to delay in return to expected LOC  -Multiple seizures throughout the day  -Has been loaded with keppra and vimpat  -Addl seizure evening of 10/24, Onfi added on.   -Current AED regimen:   - Keppra 750 BID   - Vimpat 200 BID  -No seizures 10/31  -Keppra dose reduced to 750mg BID to renally dose; EEG cap removed    Psychiatric  Major depressive disorder, recurrent episode, mild  Restarting home duloxetine.    Pulmonary  ILD (interstitial lung disease)  ILD on 4L of O2 at home    SpO2 goal > 88%  Duo nebs and tiotropium  Currently on trach  Will add chest physiotherapy and HTN nebs to help thin secretions    Cardiac/Vascular  Chronic combined systolic and diastolic heart failure  06/29/2024 echo with EF 45%, was previously 30-40%    Repeat echo shows his EF is still 45%  HDS    Persistent atrial fibrillation  In atrial fibrillation on admit  Hold Coumadin in setting of acute intraparenchymal hemorrhage  Heparin subcu for VTE prophylaxis    Hyperlipidemia LDL goal <100  Lipid panel showed low LDL, low HDL, low total cholesterol  Atorvastatin    Coronary artery disease  S/p CABG x2 in 2004  Patient has reportedly not been on Plavix recently  because of his Coumadin  -Aspirin resumed today.       Benign hypertension with chronic kidney disease, stage III  SBP goal < 160  PRN labetalol and hydralazine   Home meds as needed for HTN      Renal/  Hyperkalemia  Hyperkalemia overnight (11/1), to 6.3. Repeat still elevated at 5.7.  -Given lokelma and albuterol 10mg x1 with improvement  -Will continue to follow    LEANDRO (acute kidney injury)  LEANDRO on CKD, Cr 1.8 on initial presentation to ED    Recent Labs     11/01/24  0052 11/01/24  0511 11/01/24  0833   CREATININE 1.7* 1.6* 1.6*   EGFRNORACEVR 39.8* 42.8* 42.8*        Plan  - LEANDRO is resolved, increased to 1.5  - Avoid nephrotoxins and renally dose meds for GFR listed above  - Monitor urine output, serial BMP, and adjust therapy as needed  - Q1H I&Os    Chronic kidney disease, stage 3a  Cr 1.8 on initial presentation to ED, increased to 2.6,    Creatine stable for now. BMP reviewed- noted Estimated Creatinine Clearance: 37.5 mL/min (A) (based on SCr of 1.6 mg/dL (H)). according to latest data. Based on current GFR, CKD stage is stage 3 - GFR 30-59.  Monitor UOP and serial BMP and adjust therapy as needed. Renally dose meds. Avoid nephrotoxic medications and procedures.    Oncology  Leukocytosis  Elevated WBC 13.5 today. Procal elevated at 0.82.  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 Beta hydroxybutyrate and urine ketones negative. Serum CO2 22. Given SQ insulin at OSH.  On arrival to [facility], . Hb A1c 9.9% on admit.     Resuming scheduled insulin.    Patient's FSGs are uncontrolled due to hyperglycemia on current medication regimen.  Last A1c  reviewed-   Lab Results   Component Value Date    LABA1C 6.8 10/09/2017    HGBA1C 9.9 (H) 10/17/2024     Most recent fingerstick glucose reviewed-   Recent Labs   Lab 11/01/24  0815 11/01/24  1004 11/01/24  1205 11/01/24  1617   POCTGLUCOSE 259* 209* 262* 199*       Current correctional scale  Medium  Increase anti-hyperglycemic dose as follows-   Antihyperglycemics (From admission, onward)      Start     Stop Route Frequency Ordered    11/01/24 1200  insulin aspart U-100 pen 7 Units         -- SubQ 6 times per day 11/01/24 0908    11/01/24 1000  insulin glargine U-100 (Lantus) pen 20 Units         -- SubQ Daily 11/01/24 0908    10/25/24 1133  insulin aspart U-100 pen 0-10 Units         -- SubQ Every 4 hours PRN 10/25/24 1036    10/22/24 1400  insulin regular in 0.9 % NaCl 100 unit/100 mL (1 unit/mL) infusion        Question Answer Comment   Insulin Rate Adjustment (DO NOT MODIFY ANSWER) [email or web address]    Enter initial dose from Infusion Protocol Chart (Units/hr): 1.5        10/25/24 1431 IV Continuous 10/22/24 1252    10/21/24 1145  insulin regular in 0.9 % NaCl 100 unit/100 mL (1 unit/mL) infusion        Question:  Enter initial dose from Infusion Protocol Chart (Units/hr):  Answer:  1.65    10/21/24 1343 IV Continuous 10/21/24 1042          Hold Oral hypoglycemics while patient is in the hospital.      Other  Obstructive sleep apnea treated with BiPAP  Currently with trach on vent  Daily ABG  Daily CXR          The patient is being Prophylaxed for:  Venous Thromboembolism with: Chemical  Stress Ulcer with: H2B  Ventilator Pneumonia with: chlorhexidine oral care    Activity Orders            Elevate HOB starting at 10/31 1445    Diet NPO Except for: Medication: NPO starting at 10/31 0001    Turn patient starting at 10/18 0000          Full Code    Dolores Moore DO  Neurocritical Care  [facility] - Neuro Critical Care

## 2024-11-01 NOTE — ASSESSMENT & PLAN NOTE
Initial presentation c/f HHS w glucose > 600. Beta hydroxybutyrate and urine ketones negative. Serum CO2 22. Given SQ insulin at OSH.  [place] [facility] [place] A1c 9.9% on admit.     Resuming scheduled insulin.    Patient's FSGs are uncontrolled due to hyperglycemia on current medication regimen.  Last A1c reviewed-   Lab Results   Component Value Date    LABA1C 6.8 10/09/2017    HGBA1C 9.9 (H) 10/17/2024     Most recent fingerstick glucose reviewed-   Recent Labs   Lab 11/01/24  0815 11/01/24  1004 11/01/24  1205 11/01/24  1617   POCTGLUCOSE 259* 209* 262* 199*       Current correctional scale  Medium  Increase anti-hyperglycemic dose as follows-   Antihyperglycemics (From admission, onward)      Start     Stop Route Frequency Ordered    11/01/24 1200  insulin aspart U-100 pen 7 Units         -- SubQ 6 times per day 11/01/24 0908    11/01/24 1000  insulin glargine U-100 (Lantus) pen 20 Units         -- SubQ Daily 11/01/24 0908    10/25/24 1133  insulin aspart U-100 pen 0-10 Units         -- SubQ Every 4 hours PRN 10/25/24 1036    10/22/24 1400  insulin regular in 0.9 % NaCl 100 unit/100 mL (1 unit/mL) infusion        Question Answer Comment   Insulin Rate Adjustment (DO NOT MODIFY ANSWER) [email or web address]    Enter initial dose from Infusion Protocol Chart (Units/hr): 1.5        10/25/24 1431 IV Continuous 10/22/24 1252    10/21/24 1145  insulin regular in 0.9 % NaCl 100 unit/100 mL (1 unit/mL) infusion        Question:  Enter initial dose from Infusion Protocol Chart (Units/hr):  Answer:  1.65    10/21/24 1343 IV Continuous 10/21/24 1042          Hold Oral hypoglycemics while patient is in the hospital.

## 2024-11-01 NOTE — ASSESSMENT & PLAN NOTE
Hyperkalemia overnight (11/1), to 6.3. Repeat still elevated at 5.7.  -Given lokelma and albuterol 10mg x1 with improvement  -Will continue to follow

## 2024-11-01 NOTE — ASSESSMENT & PLAN NOTE
Cr 1.8 on initial presentation to ED, increased to 2.6,    Creatine stable for now. BMP reviewed- noted Estimated Creatinine Clearance: 37.5 mL/min (A) (based on SCr of 1.6 mg/dL (H)). according to latest data. Based on current GFR, CKD stage is stage 3 - GFR 30-59.  Monitor UOP and serial BMP and adjust therapy as needed. Renally dose meds. Avoid nephrotoxic medications and procedures.

## 2024-11-01 NOTE — ASSESSMENT & PLAN NOTE
S/p CABG x2 in 2004  Patient has reportedly not been on Plavix recently because of his Coumadin  -Aspirin resumed today.

## 2024-11-01 NOTE — PROGRESS NOTES
[facility] - Neuro Critical Care  General Surgery  Progress Note    Subjective:     History of Present Illness:  83 yo M with CHF, Afib on coumadin, and DM here after a traumatic ICH. He has had significant seizure activity and has remained intubated due to his mental status. A goals of care meeting was held with his daughters who wish to pursue tracheostomy and enteral access creation.    Post-Op Info:  Procedure(s) (LRB):  EGD, WITH PEG TUBE INSERTION (N/A)  CREATION, TRACHEOSTOMY (N/A)   1 Day Post-Op     Interval History: NAEON. Afebrile. HDS. POD1 trach, PEG. Vent at 40/5. PEG with 200cc out    Medications:  Continuous Infusions:   D10W   Intravenous Continuous PRN         Scheduled Meds:   atorvastatin  40 mg Per OG tube Daily    famotidine  20 mg Per OG tube Daily    heparin (porcine)  5,000 Units Subcutaneous Q8H    lacosamide  200 mg Per OG tube Q12H    levetiracetam  750 mg Per OG tube BID    silodosin  4 mg Per OG tube Daily    sodium chloride 3%  4 mL Nebulization Q6H     PRN Meds:  Current Facility-Administered Medications:     acetaminophen, 650 mg, Per OG tube, Q6H PRN    D10W, , Intravenous, Continuous PRN    dextrose 10%, 12.5 g, Intravenous, PRN    dextrose 10%, 25 g, Intravenous, PRN    glucagon (human recombinant), 1 mg, Intramuscular, PRN    hydrALAZINE, 10 mg, Intravenous, Q4H PRN    insulin aspart U-100, 0-10 Units, Subcutaneous, Q4H PRN    labetalol, 10 mg, Intravenous, Q4H PRN    magnesium oxide, 800 mg, Per OG tube, PRN    magnesium oxide, 800 mg, Per OG tube, PRN    ondansetron, 4 mg, Intravenous, Q8H PRN    potassium bicarbonate, 35 mEq, Per OG tube, PRN    potassium bicarbonate, 50 mEq, Per OG tube, PRN    potassium bicarbonate, 60 mEq, Per OG tube, PRN    potassium, sodium phosphates, 2 packet, Per OG tube, PRN    potassium, sodium phosphates, 2 packet, Per OG tube, PRN    potassium, sodium phosphates, 2 packet, Per OG tube, PRN     Review of patient's allergies indicates:   Allergen  Reactions    Aricept odt [donepezil] Diarrhea and Nausea And Vomiting    Codeine Nausea Only     weak    Ranexa [ranolazine]      Objective:     Vital Signs (Most Recent):  Temp: 98.1 °F (36.7 °C) (11/01/24 0701)  Pulse: 80 (11/01/24 0738)  Resp: 18 (11/01/24 0738)  BP: (!) 142/70 (11/01/24 0701)  SpO2: 100 % (11/01/24 0738) Vital Signs (24h Range):  Temp:  [97.1 °F (36.2 °C)-98.6 °F (37 °C)] 98.1 °F (36.7 °C)  Pulse:  [64-89] 80  Resp:  [14-20] 18  SpO2:  [3 %-100 %] 100 %  BP: (122-179)/(58-86) 142/70     Weight: 83.5 kg (184 lb)  Body mass index is 27.98 kg/m².    Intake/Output - Last 3 Shifts         10/30 0700  10/31 0659 10/31 0700  11/01 0659 11/01 0700 11/02 0659    NG/GT 1020 90     IV Piggyback  150     Total Intake(mL/kg) 1020 (12.2) 240 (2.9)     Urine (mL/kg/hr) 1785 (0.9) 1555 (0.8) 180 (2.6)    Drains  200     Stool 0      Total Output 1785 1755 180    Net -765 -1515 -180           Stool Occurrence 1 x               Physical Exam  Vitals and nursing note reviewed.   Constitutional:       General: He is not in acute distress.  HENT:      Head: Normocephalic.      Nose: Nose normal.   Eyes:      Conjunctiva/sclera: Conjunctivae normal.   Neck:      Comments: Trach without bleeding noted  Cardiovascular:      Rate and Rhythm: Normal rate.   Pulmonary:      Effort: Pulmonary effort is normal. No respiratory distress.      Comments: Vent Mode: Spont  Oxygen Concentration (%):  [40] 40  Resp Rate Total:  [14 br/min-23 br/min] 16 br/min  PEEP/CPAP:  [5 cmH20] 5 cmH20  Pressure Support:  [7 cmH20] 7 cmH20  Mean Airway Pressure:  [7.4 cmH20-8.1 cmH20] 7.6 cmH20  Abdominal:      General: There is no distension.      Palpations: Abdomen is soft.      Tenderness: There is no guarding or rebound.      Comments: PEG to L abdomen without surrounding erythema or drainage, bilious output in bag   Musculoskeletal:      Right lower leg: No edema.      Left lower leg: No edema.   Skin:     General: Skin is warm and dry.           Significant Labs:  I have reviewed all pertinent lab results within the past 24 hours.  CBC:   Recent Labs   Lab 11/01/24 0052   WBC 12.02   RBC 2.68*   HGB 7.5*   HCT 24.9*      MCV 93   MCH 28.0   MCHC 30.1*     CMP:   Recent Labs   Lab 11/01/24 0052 11/01/24  0511   * 197*   CALCIUM 8.6* 8.7   ALBUMIN 1.9*  --    PROT 6.3  --     142   K 5.7* 5.8*  5.8*   CO2 23 23    108   BUN 52* 51*   CREATININE 1.7* 1.6*   ALKPHOS 89  --    ALT 13  --    AST 22  --    BILITOT 0.3  --        Significant Diagnostics:  I have reviewed all pertinent imaging results/findings within the past 24 hours.  Assessment/Plan:     Intracranial hemorrhage  Patient is a 84 year old male with CHF, Afib on coumadin, and DM here after a traumatic ICH. He has had a prolonged course of mechanical ventilation and his mental status does not allow for trials of extubation. He will also need enteral access. S/p trach, PEG 10/31    - Ok to use PEG tube for meds and feeds. Recommend slow advancement to goal  - Remainder of care per primary team, general surgery to sign off  - Please contact general surgery with any questions, concerns, or clinical status changes      Case discussed with Dr. Garcia.      Robert Gaspar PAEsequielC  General Surgery  [facility] - Neuro Critical Care

## 2024-11-01 NOTE — ASSESSMENT & PLAN NOTE
ILD on 4L of O2 at home    SpO2 goal > 88%  Duo nebs and tiotropium  Currently on trach  Will add chest physiotherapy and HTN nebs to help thin secretions

## 2024-11-01 NOTE — ASSESSMENT & PLAN NOTE
Patient is a 84 year old male with CHF, Afib on coumadin, and DM here after a traumatic ICH. He has had a prolonged course of mechanical ventilation and his mental status does not allow for trials of extubation. He will also need enteral access. S/p trach, PEG 10/31    - Ok to use PEG tube for meds and feeds. Recommend slow advancement to goal  - Remainder of care per primary team, general surgery to sign off  - Please contact general surgery with any questions, concerns, or clinical status changes

## 2024-11-02 LAB
ALBUMIN SERPL BCP-MCNC: 1.9 G/DL (ref 3.5–5.2)
ALLENS TEST: ABNORMAL
ALP SERPL-CCNC: 81 U/L (ref 40–150)
ALT SERPL W/O P-5'-P-CCNC: 10 U/L (ref 10–44)
ANION GAP SERPL CALC-SCNC: 8 MMOL/L (ref 8–16)
AST SERPL-CCNC: 19 U/L (ref 10–40)
BACTERIA BLD CULT: NORMAL
BACTERIA BLD CULT: NORMAL
BASOPHILS # BLD AUTO: 0.04 K/UL (ref 0–0.2)
BASOPHILS NFR BLD: 0.4 % (ref 0–1.9)
BILIRUB SERPL-MCNC: 0.3 MG/DL (ref 0.1–1)
BUN SERPL-MCNC: 49 MG/DL (ref 8–23)
CALCIUM SERPL-MCNC: 8.5 MG/DL (ref 8.7–10.5)
CHLORIDE SERPL-SCNC: 108 MMOL/L (ref 95–110)
CO2 SERPL-SCNC: 25 MMOL/L (ref 23–29)
CREAT SERPL-MCNC: 1.7 MG/DL (ref 0.5–1.4)
DELSYS: ABNORMAL
DIFFERENTIAL METHOD BLD: ABNORMAL
EOSINOPHIL # BLD AUTO: 0.2 K/UL (ref 0–0.5)
EOSINOPHIL NFR BLD: 2.2 % (ref 0–8)
ERYTHROCYTE [DISTWIDTH] IN BLOOD BY AUTOMATED COUNT: 17.4 % (ref 11.5–14.5)
EST. GFR  (NO RACE VARIABLE): 39.8 ML/MIN/1.73 M^2
FIO2: 40
GLUCOSE SERPL-MCNC: 250 MG/DL (ref 70–110)
HCO3 UR-SCNC: 27.9 MMOL/L (ref 24–28)
HCT VFR BLD AUTO: 26.5 % (ref 40–54)
HCT VFR BLD CALC: 21 %PCV (ref 36–54)
HGB BLD-MCNC: 7.7 G/DL (ref 14–18)
IMM GRANULOCYTES # BLD AUTO: 0.17 K/UL (ref 0–0.04)
IMM GRANULOCYTES NFR BLD AUTO: 1.8 % (ref 0–0.5)
LYMPHOCYTES # BLD AUTO: 0.4 K/UL (ref 1–4.8)
LYMPHOCYTES NFR BLD: 4.2 % (ref 18–48)
MAGNESIUM SERPL-MCNC: 2.8 MG/DL (ref 1.6–2.6)
MCH RBC QN AUTO: 27.5 PG (ref 27–31)
MCHC RBC AUTO-ENTMCNC: 29.1 G/DL (ref 32–36)
MCV RBC AUTO: 95 FL (ref 82–98)
MODE: ABNORMAL
MONOCYTES # BLD AUTO: 1 K/UL (ref 0.3–1)
MONOCYTES NFR BLD: 10.8 % (ref 4–15)
NEUTROPHILS # BLD AUTO: 7.7 K/UL (ref 1.8–7.7)
NEUTROPHILS NFR BLD: 80.6 % (ref 38–73)
NRBC BLD-RTO: 0 /100 WBC
PCO2 BLDA: 42.2 MMHG (ref 35–45)
PEEP: 5
PH SMN: 7.43 [PH] (ref 7.35–7.45)
PHOSPHATE SERPL-MCNC: 4.5 MG/DL (ref 2.7–4.5)
PLATELET # BLD AUTO: 231 K/UL (ref 150–450)
PMV BLD AUTO: 10.7 FL (ref 9.2–12.9)
PO2 BLDA: 113 MMHG (ref 80–100)
POC BE: 4 MMOL/L
POC IONIZED CALCIUM: 1.16 MMOL/L (ref 1.06–1.42)
POC SATURATED O2: 99 % (ref 95–100)
POC TCO2: 29 MMOL/L (ref 23–27)
POCT GLUCOSE: 137 MG/DL (ref 70–110)
POCT GLUCOSE: 153 MG/DL (ref 70–110)
POCT GLUCOSE: 190 MG/DL (ref 70–110)
POCT GLUCOSE: 194 MG/DL (ref 70–110)
POCT GLUCOSE: 211 MG/DL (ref 70–110)
POCT GLUCOSE: 229 MG/DL (ref 70–110)
POTASSIUM BLD-SCNC: 4.5 MMOL/L (ref 3.5–5.1)
POTASSIUM SERPL-SCNC: 4.4 MMOL/L (ref 3.5–5.1)
PROT SERPL-MCNC: 6 G/DL (ref 6–8.4)
PS: 7
RBC # BLD AUTO: 2.8 M/UL (ref 4.6–6.2)
SAMPLE: ABNORMAL
SITE: ABNORMAL
SODIUM BLD-SCNC: 148 MMOL/L (ref 136–145)
SODIUM SERPL-SCNC: 141 MMOL/L (ref 136–145)
WBC # BLD AUTO: 9.58 K/UL (ref 3.9–12.7)

## 2024-11-02 PROCEDURE — 84100 ASSAY OF PHOSPHORUS: CPT

## 2024-11-02 PROCEDURE — 25000242 PHARM REV CODE 250 ALT 637 W/ HCPCS

## 2024-11-02 PROCEDURE — 99900035 HC TECH TIME PER 15 MIN (STAT)

## 2024-11-02 PROCEDURE — 94003 VENT MGMT INPAT SUBQ DAY: CPT

## 2024-11-02 PROCEDURE — 99291 CRITICAL CARE FIRST HOUR: CPT | Mod: GC,,, | Performed by: INTERNAL MEDICINE

## 2024-11-02 PROCEDURE — 25000003 PHARM REV CODE 250: Performed by: INTERNAL MEDICINE

## 2024-11-02 PROCEDURE — 99900026 HC AIRWAY MAINTENANCE (STAT)

## 2024-11-02 PROCEDURE — 63600175 PHARM REV CODE 636 W HCPCS: Mod: JZ,JG

## 2024-11-02 PROCEDURE — 85025 COMPLETE CBC W/AUTO DIFF WBC: CPT

## 2024-11-02 PROCEDURE — 36600 WITHDRAWAL OF ARTERIAL BLOOD: CPT

## 2024-11-02 PROCEDURE — 63600175 PHARM REV CODE 636 W HCPCS

## 2024-11-02 PROCEDURE — 20000000 HC ICU ROOM

## 2024-11-02 PROCEDURE — 27100171 HC OXYGEN HIGH FLOW UP TO 24 HOURS

## 2024-11-02 PROCEDURE — 25000003 PHARM REV CODE 250

## 2024-11-02 PROCEDURE — 83735 ASSAY OF MAGNESIUM: CPT

## 2024-11-02 PROCEDURE — 82803 BLOOD GASES ANY COMBINATION: CPT

## 2024-11-02 PROCEDURE — 94761 N-INVAS EAR/PLS OXIMETRY MLT: CPT

## 2024-11-02 PROCEDURE — 94640 AIRWAY INHALATION TREATMENT: CPT

## 2024-11-02 PROCEDURE — 80053 COMPREHEN METABOLIC PANEL: CPT

## 2024-11-02 PROCEDURE — 94668 MNPJ CHEST WALL SBSQ: CPT

## 2024-11-02 PROCEDURE — 51798 US URINE CAPACITY MEASURE: CPT

## 2024-11-02 RX ORDER — AMOXICILLIN 250 MG
1 CAPSULE ORAL 2 TIMES DAILY
Status: DISCONTINUED | OUTPATIENT
Start: 2024-11-02 | End: 2024-11-03

## 2024-11-02 RX ADMIN — INSULIN ASPART 7 UNITS: 100 INJECTION, SOLUTION INTRAVENOUS; SUBCUTANEOUS at 12:11

## 2024-11-02 RX ADMIN — INSULIN ASPART 7 UNITS: 100 INJECTION, SOLUTION INTRAVENOUS; SUBCUTANEOUS at 08:11

## 2024-11-02 RX ADMIN — HEPARIN SODIUM 5000 UNITS: 5000 INJECTION INTRAVENOUS; SUBCUTANEOUS at 10:11

## 2024-11-02 RX ADMIN — INSULIN ASPART 2 UNITS: 100 INJECTION, SOLUTION INTRAVENOUS; SUBCUTANEOUS at 08:11

## 2024-11-02 RX ADMIN — ASPIRIN 81 MG CHEWABLE TABLET 81 MG: 81 TABLET CHEWABLE at 08:11

## 2024-11-02 RX ADMIN — INSULIN ASPART 4 UNITS: 100 INJECTION, SOLUTION INTRAVENOUS; SUBCUTANEOUS at 12:11

## 2024-11-02 RX ADMIN — SODIUM CHLORIDE SOLN NEBU 3% 4 ML: 3 NEBU SOLN at 07:11

## 2024-11-02 RX ADMIN — LACOSAMIDE 200 MG: 50 TABLET, FILM COATED ORAL at 08:11

## 2024-11-02 RX ADMIN — INSULIN ASPART 7 UNITS: 100 INJECTION, SOLUTION INTRAVENOUS; SUBCUTANEOUS at 04:11

## 2024-11-02 RX ADMIN — SENNOSIDES AND DOCUSATE SODIUM 1 TABLET: 50; 8.6 TABLET ORAL at 08:11

## 2024-11-02 RX ADMIN — INSULIN ASPART 7 UNITS: 100 INJECTION, SOLUTION INTRAVENOUS; SUBCUTANEOUS at 03:11

## 2024-11-02 RX ADMIN — SILODOSIN 4 MG: 4 CAPSULE ORAL at 08:11

## 2024-11-02 RX ADMIN — LABETALOL HYDROCHLORIDE 10 MG: 5 INJECTION, SOLUTION INTRAVENOUS at 12:11

## 2024-11-02 RX ADMIN — INSULIN ASPART 1 UNITS: 100 INJECTION, SOLUTION INTRAVENOUS; SUBCUTANEOUS at 08:11

## 2024-11-02 RX ADMIN — LEVETIRACETAM 750 MG: 500 SOLUTION ORAL at 08:11

## 2024-11-02 RX ADMIN — CARVEDILOL 3.12 MG: 3.12 TABLET, FILM COATED ORAL at 08:11

## 2024-11-02 RX ADMIN — HEPARIN SODIUM 5000 UNITS: 5000 INJECTION INTRAVENOUS; SUBCUTANEOUS at 05:11

## 2024-11-02 RX ADMIN — HEPARIN SODIUM 5000 UNITS: 5000 INJECTION INTRAVENOUS; SUBCUTANEOUS at 02:11

## 2024-11-02 RX ADMIN — SODIUM CHLORIDE SOLN NEBU 3% 4 ML: 3 NEBU SOLN at 12:11

## 2024-11-02 RX ADMIN — ATORVASTATIN CALCIUM 40 MG: 40 TABLET, FILM COATED ORAL at 08:11

## 2024-11-02 RX ADMIN — INSULIN GLARGINE 20 UNITS: 100 INJECTION, SOLUTION SUBCUTANEOUS at 08:11

## 2024-11-02 RX ADMIN — SODIUM CHLORIDE SOLN NEBU 3% 4 ML: 3 NEBU SOLN at 08:11

## 2024-11-02 RX ADMIN — DULOXETINE HYDROCHLORIDE 60 MG: 60 CAPSULE, DELAYED RELEASE ORAL at 08:11

## 2024-11-02 RX ADMIN — INSULIN ASPART 4 UNITS: 100 INJECTION, SOLUTION INTRAVENOUS; SUBCUTANEOUS at 04:11

## 2024-11-02 RX ADMIN — FAMOTIDINE 20 MG: 20 TABLET ORAL at 08:11

## 2024-11-02 NOTE — ASSESSMENT & PLAN NOTE
Cr 1.8 on initial presentation to ED, increased to 2.6,    Creatine stable for now. BMP reviewed- noted Estimated Creatinine Clearance: 35.3 mL/min (A) (based on SCr of 1.7 mg/dL (H)). according to latest data. Based on current GFR, CKD stage is stage 3 - GFR 30-59.  Monitor UOP and serial BMP and adjust therapy as needed. Renally dose meds. Avoid nephrotoxic medications and procedures.

## 2024-11-02 NOTE — ASSESSMENT & PLAN NOTE
LEANDRO on CKD, Cr 1.8 on initial presentation to ED    Recent Labs     11/01/24  0511 11/01/24  0833 11/02/24  0009   CREATININE 1.6* 1.6* 1.7*   EGFRNORACEVR 42.8* 42.8* 39.8*        Plan  - LEANDRO is resolved, increased to 1.7  - Avoid nephrotoxins and renally dose meds for GFR listed above  - Monitor urine output, serial BMP, and adjust therapy as needed  - Q1H I&Os

## 2024-11-02 NOTE — ASSESSMENT & PLAN NOTE
S/p CABG x2 in 2004  Patient has reportedly not been on Plavix recently because of his Coumadin  -Aspirin resumed yx.

## 2024-11-02 NOTE — ASSESSMENT & PLAN NOTE
Initial presentation c/f HHS w glucose > 600. Beta hydroxybutyrate and urine ketones negative. Serum CO2 22. Given SQ insulin at OSH.  On arrival to [facility] [place], . Hb A1c 9.9% on admit.     Resuming scheduled insulin.    Patient's FSGs are uncontrolled due to hyperglycemia on current medication regimen.  Last A1c reviewed-   Lab Results   Component Value Date    LABA1C 6.8 10/09/2017    HGBA1C 9.9 (H) 10/17/2024     Most recent fingerstick glucose reviewed-   Recent Labs   Lab 11/02/24  0358 11/02/24  0847 11/02/24  1209 11/02/24  1622   POCTGLUCOSE 137* 190* 229* 211*       Current correctional scale  Medium  Increase anti-hyperglycemic dose as follows-   Antihyperglycemics (From admission, onward)    Start     Stop Route Frequency Ordered    11/01/24 1200  insulin aspart U-100 pen 7 Units         -- SubQ 6 times per day 11/01/24 0908    11/01/24 1000  insulin glargine U-100 (Lantus) pen 20 Units         -- SubQ Daily 11/01/24 0908    10/25/24 1133  insulin aspart U-100 pen 0-10 Units         -- SubQ Every 4 hours PRN 10/25/24 1036    10/22/24 1400  insulin regular in 0.9 % NaCl 100 unit/100 mL (1 unit/mL) infusion        Question Answer Comment   Insulin Rate Adjustment (DO NOT MODIFY ANSWER) [email or web address]    Enter initial dose from Infusion Protocol Chart (Units/hr): 1.5        10/25/24 1431 IV Continuous 10/22/24 1252    10/21/24 1145  insulin regular in 0.9 % NaCl 100 unit/100 mL (1 unit/mL) infusion        Question:  Enter initial dose from Infusion Protocol Chart (Units/hr):  Answer:  1.65    10/21/24 1343 IV Continuous 10/21/24 1042        Hold Oral hypoglycemics while patient is in the hospital.

## 2024-11-02 NOTE — PROGRESS NOTES
[facility] - Neuro Critical Care  Neurocritical Care  Progress Note    Admit Date: 10/17/2024  Service Date: 11/02/2024  Length of Stay: 16    Subjective:     Chief Complaint: Traumatic right-sided intracerebral hemorrhage without loss of consciousness    History of Present Illness: Percy Ramirez is a 83M PMHx of HTN, HLD, DM2, CAD s/p CABG x2, Afib on Coumadin, ILD on 4L of O2, HFrEF (45%), presenting as a transfer for a large traumatic R frontal IPH. Pt initially presented to [facility] ED yesterday ago after mechanical fall and was found to have 4mm parafalcine SDH. Repeat CTH was stable. Pt was discharged home on keppra and instructed to hold his blood thinners. On 10/17/24 pt was lethargic and fell. He was brought back to  ED. Blood glucose was >600 and he was febrile (101F). CTH revealed 5cm R frontal IPH with 7mm MLS. Kcentra, 10mg vitamin K, and 3% HTS bolus were given. He was transferred to [facility] for higher level of care.     Hospital Course: 10/19/2024: POD #1 from both his MI LS as well as his right subdural/intraparenchymal hemorrhage evacuation.  He is still intubated and although sedation is off, he is minimally responsive. Coreg doubled. NG tube placed and tube feeds started. Still on insulin drip.  10/20/2024: POD #2 s/p crani for SDH evacuation. Leukocytosis noted post-op, no accompanying fever/chills. Straight cath x1. Tachycardia responding to IVF boluses. Tube feeds started, will titrate to goal. Insulin gtt continued, plans to transition to subQ tomorrow.  10/21/2021: POD#3. EEG placed this morning, revealing multiple r-sided seizures. Keppra load completed and maintenance dose increased. Concern for LUE no longer WD, stat head CT showing new/increased hyperdensity in resection bed w/o worsening mass effect or MLS. 1L LR given for LEANDRO. Will continue to monitor Na.   10/22/24: POD#4. Addl seizures despite keppra load. Vimpat and propofol started yesterday with significant reduction in number.  1U PRBC transfused overnight. Required levo for short period of time after initiation of propofol.  10/23/24: POD#5.  Patient continues to be hyponatremic.  Free water volume increased.  Patient with total of 11 seizures yesterday.  Vimpat and propofol dosing increased with improvement.  10/24/24: POD#6. NGT adjusted overnight. No additional seizures on current AEM regimen. Platelets and Hgb decreasing, will order peripheral smear to assess for HIT  10/25/2024 POD#7. Addl seizure, Onfi added to AEM regimen. Drains removed by NSGY. Will attempt to wean propofol tomorrow.   10/26/2024 POD#8. MRI complete yx. Weaning propofol today.   10/27/2024 POD#9. Patient began having seizures again overnight following cessation of propofol gtt. Resumed propofol at 10cc/hr for now, increased Keppra dose, and plan to attempt to wean again tomorrow. Persistent hyperglycemia on tube feeds, optimizing insulin regimen. Leukocytosis pending respiratory cultures.  10/28/2024 POD10. No addl seizures overnight. Propofol turned off this morning. Clobazam dosing decreased as well. Resp cultures negative, but will obtain blood and urine cultures. Cont abx. NSGY signing off.   10/29/2024 POD11. No seizures. Clobazam down to 10mg QHS. Infiltrate developing RLL, sputum culture likely due to colonization of resp tract per ID.   10/30/2024 POD12. No seizures. Clobazam discontinued. Abx discontinued, as no significant sputum production and resp status is stable. Discussion with family today - trach/PEG likely tomorrow.   10/31/2024 POD13. Single apneic episode yx evening with no EEG correlation. Spontaneously resolved. To OR today for trach/PEG. EEG cap to be removed.   11/1/24 POD14. Hyperkalemia overnight, resolving with lokelma and albuterol 10mg x1.   11/2/24 POD15. NAEON. Remains stable.    Interval History:      NAEON. Remaining stable.     Objective:     Vitals:  Temp: 97.8 °F (36.6 °C)  Pulse: 78  Rhythm: atrial rhythm  BP: 128/64  MAP  (mmHg): 88  Resp: 11  SpO2: 98 %  Oxygen Concentration (%): 32  Vent Mode: Spont  Pressure Support: 5 cmH20  PEEP/CPAP: 5 cmH20  Peak Airway Pressure: 10 cmH20  Mean Airway Pressure: 6.1 cmH20  Plateau Pressure: 13 cmH20    Temp  Min: 97.5 °F (36.4 °C)  Max: 98 °F (36.7 °C)  Pulse  Min: 61  Max: 86  BP  Min: 125/66  Max: 166/79  MAP (mmHg)  Min: 84  Max: 113  Resp  Min: 10  Max: 17  SpO2  Min: 95 %  Max: 100 %  Oxygen Concentration (%)  Min: 32  Max: 40    11/01 0701 - 11/02 0700  In: 705   Out: 1400 [Urine:1400]   Unmeasured Output  Urine Occurrence: 1  Stool Occurrence: 1  Pad Count: 1        Physical Exam  Constitutional:       General: He is not in acute distress.     Appearance: He is not toxic-appearing.   Cardiovascular:      Comments: Irregularly irreguar  Pulmonary:      Effort: No respiratory distress.      Breath sounds: Normal breath sounds.   Abdominal:      General: There is no distension.      Palpations: Abdomen is soft.   Skin:     General: Skin is warm and dry.     Remains unarousable.       Medications:  [id number]    Scheduledaspirin, 81 mg, Daily  atorvastatin, 40 mg, Daily  carvediloL, 3.125 mg, BID  DULoxetine, 60 mg, Daily  famotidine, 20 mg, Daily  heparin (porcine), 5,000 Units, Q8H  insulin aspart U-100, 7 Units, 6 times per day  insulin glargine U-100, 20 Units, Daily  lacosamide, 200 mg, Q12H  levetiracetam, 750 mg, BID  senna-docusate 8.6-50 mg, 1 tablet, BID  silodosin, 4 mg, Daily  sodium chloride 3%, 4 mL, Q6H    PRNacetaminophen, 650 mg, Q6H PRN  D10W, , Continuous PRN  dextrose 10%, 12.5 g, PRN  dextrose 10%, 25 g, PRN  glucagon (human recombinant), 1 mg, PRN  hydrALAZINE, 10 mg, Q4H PRN  insulin aspart U-100, 0-10 Units, Q4H PRN  labetalol, 10 mg, Q4H PRN  ondansetron, 4 mg, Q8H PRN      Today I personally reviewed pertinent medications, laboratory results, notably:    Diet  Diet NPO Except for: Medication    Assessment/Plan:     Neuro  * Traumatic right-sided intracerebral  hemorrhage without loss of consciousness  83M PMHx of HTN, HLD, DM2, CAD s/p CABG x2, Afib on Coumadin, ILD on 4L of O2, HFrEF (45%), presenting as a transfer for a large traumatic R frontal IPH reversed with Kcentra and vitamin K with repeat INR 1.2.    Admit to NCC  q1h neuro checks, vitals, and I&O's  CBC, CMP, mag, and phos daily   Coags, TSH, A1c, lipid panel, UA  Echo, EKG, CXR  SBP <160  Na goals > 135  NSGY signed off  SCDs; heparin for VTE prophylaxis  PT/OT/SLP when extubated  Trach/PEG POD1, neuro exam unchanged at this time    Altered mental status  See primary problem    Intracranial hemorrhage  See primary problem    Subdural hematoma  See primary problem    Focal seizures  EEG placed morning of 10/21 due to delay in return to expected LOC  -Multiple seizures throughout the day  -Has been loaded with keppra and vimpat  -Addl seizure evening of 10/24, Onfi added on.   -Current AED regimen:   - Keppra 750 BID   - Vimpat 200 BID  -No seizures 10/31  -Keppra dose reduced to 750mg BID to renally dose; EEG cap removed    Psychiatric  Major depressive disorder, recurrent episode, mild  Restarting home duloxetine.    Pulmonary  ILD (interstitial lung disease)  ILD on 4L of O2 at home    SpO2 goal > 88%  Duo nebs and tiotropium  Currently on trach  Will add chest physiotherapy and HTN nebs to help thin secretions    Cardiac/Vascular  Chronic combined systolic and diastolic heart failure  06/29/2024 echo with EF 45%, was previously 30-40%    Repeat echo shows his EF is still 45%  HDS    Persistent atrial fibrillation  In atrial fibrillation on admit  Hold Coumadin in setting of acute intraparenchymal hemorrhage  Heparin subcu for VTE prophylaxis    Hyperlipidemia LDL goal <100  Lipid panel showed low LDL, low HDL, low total cholesterol  Atorvastatin    Coronary artery disease  S/p CABG x2 in 2004  Patient has reportedly not been on Plavix recently because of his Coumadin  -Aspirin resumed yx.       Benign  hypertension with chronic kidney disease, stage III  SBP goal < 160  PRN labetalol and hydralazine   Home meds as needed for HTN      Renal/  Hyperkalemia  Hyperkalemia overnight (11/1), to 6.3. Repeat still elevated at 5.7.  -Given lokelma and albuterol 10mg x1 with improvement  -Will continue to follow    LEANDRO (acute kidney injury)  LEANDRO on CKD, Cr 1.8 on initial presentation to ED    Recent Labs     11/01/24  0511 11/01/24  0833 11/02/24  0009   CREATININE 1.6* 1.6* 1.7*   EGFRNORACEVR 42.8* 42.8* 39.8*        Plan  - LEANDRO is resolved, increased to 1.7  - Avoid nephrotoxins and renally dose meds for GFR listed above  - Monitor urine output, serial BMP, and adjust therapy as needed  - Q1H I&Os    Chronic kidney disease, stage 3a  Cr 1.8 on initial presentation to ED, increased to 2.6,    Creatine stable for now. BMP reviewed- noted Estimated Creatinine Clearance: 35.3 mL/min (A) (based on SCr of 1.7 mg/dL (H)). according to latest data. Based on current GFR, CKD stage is stage 3 - GFR 30-59.  Monitor UOP and serial BMP and adjust therapy as needed. Renally dose meds. Avoid nephrotoxic medications and procedures.    Oncology  Leukocytosis  Elevated WBC 13.5 today. Procal elevated at 0.82.  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 Beta hydroxybutyrate and urine ketones negative. Serum CO2 22. Given SQ insulin at OSH.  On arrival to [facility], . Hb A1c 9.9% on admit.     Resuming scheduled insulin.    Patient's FSGs are uncontrolled due to hyperglycemia on current medication regimen.  Last A1c reviewed-   Lab Results   Component Value Date    LABA1C 6.8  10/09/2017    HGBA1C 9.9 (H) 10/17/2024     Most recent fingerstick glucose reviewed-   Recent Labs   Lab 11/02/24  0358 11/02/24  0847 11/02/24  1209 11/02/24  1622   POCTGLUCOSE 137* 190* 229* 211*       Current correctional scale  Medium  Increase anti-hyperglycemic dose as follows-   Antihyperglycemics (From admission, onward)      Start     Stop Route Frequency Ordered    11/01/24 1200  insulin aspart U-100 pen 7 Units         -- SubQ 6 times per day 11/01/24 0908    11/01/24 1000  insulin glargine U-100 (Lantus) pen 20 Units         -- SubQ Daily 11/01/24 0908    10/25/24 1133  insulin aspart U-100 pen 0-10 Units         -- SubQ Every 4 hours PRN 10/25/24 1036    10/22/24 1400  insulin regular in 0.9 % NaCl 100 unit/100 mL (1 unit/mL) infusion        Question Answer Comment   Insulin Rate Adjustment (DO NOT MODIFY ANSWER) [email or web address]    Enter initial dose from Infusion Protocol Chart (Units/hr): 1.5        10/25/24 1431 IV Continuous 10/22/24 1252    10/21/24 1145  insulin regular in 0.9 % NaCl 100 unit/100 mL (1 unit/mL) infusion        Question:  Enter initial dose from Infusion Protocol Chart (Units/hr):  Answer:  1.65    10/21/24 1343 IV Continuous 10/21/24 1042          Hold Oral hypoglycemics while patient is in the hospital.      Other  Obstructive sleep apnea treated with BiPAP  Currently with trach on vent  Daily ABG  Daily CXR          The patient is being Prophylaxed for:  Venous Thromboembolism with: Mechanical or Chemical  Stress Ulcer with: H2B  Ventilator Pneumonia with: chlorhexidine oral care    Activity Orders            Elevate HOB starting at 10/31 1445    Diet NPO Except for: Medication: NPO starting at 10/31 0001    Turn patient starting at 10/18 0000          Full Code    Dolores Moore DO  Neurocritical Care  [facility] - Neuro Critical Care

## 2024-11-02 NOTE — PLAN OF CARE
[facility] Care Plan  POC reviewed with Percy Ramirez and family at 1400. Pt nonverbal but family verbalized understanding. Questions and concerns addressed. No acute events today. Pt progressing toward goals. Will continue to monitor. See below and flowsheets for full assessment and VS info.     - Pt placed on immersion bed.  - Labetalol given x1        Is this a stroke patient? yes- Stroke booklet reviewed with patient and family, risk factors identified for patient and stroke booklet remains at bedside for ongoing education.     Care individualization: Turn pt every 2 hours.    Neuro:  Federico Coma Scale  Best Eye Response: 1-->(E1) none  Best Motor Response: 3-->(M3) flexion to pain  Best Verbal Response: 1-->(V1) none  Federico Coma Scale Score: 5  Assessment Qualifiers: other (see comments) (pt has trach on vent)  Pupil PERRLA: yes     24 hr Temp:  [97.2 °F (36.2 °C)-98.1 °F (36.7 °C)]     CV:   Rhythm: atrial rhythm  BP goals:   SBP < 160  MAP > 65    Resp:      Vent Mode: Spont  Set Rate: 16 BPM  Oxygen Concentration (%): 40  Vt Set: 500 mL  PEEP/CPAP: 5 cmH20  Pressure Support: 7 cmH20    Plan: trach in place    GI/:     Diet/Nutrition Received: NPO, tube feeding  Last Bowel Movement: 11/01/24  Voiding Characteristics: urethral catheter (bladder)    Intake/Output Summary (Last 24 hours) at 11/1/2024 1915  Last data filed at 11/1/2024 1801  Gross per 24 hour   Intake 255 ml   Output 1585 ml   Net -1330 ml     Unmeasured Output  Urine Occurrence: 1  Stool Occurrence: 1  Pad Count: 1    Labs/Accuchecks:  Recent Labs   Lab 11/01/24 0052   WBC 12.02   RBC 2.68*   HGB 7.5*   HCT 24.9*         Recent Labs   Lab 11/01/24  0052 11/01/24  0511 11/01/24  0833 11/01/24  1617      < > 145  --    K 5.7*   < > 5.2*  5.2* 4.9   CO2 23   < > 25  --       < > 110  --    BUN 52*   < > 51*  --    CREATININE 1.7*   < > 1.6*  --    ALKPHOS 89  --   --   --    ALT 13  --   --   --    AST 22  --   --   --   "  BILITOT 0.3  --   --   --     < > = values in this interval not displayed.    No results for input(s): "PROTIME", "INR", "APTT", "HEPANTIXA" in the last 168 hours. No results for input(s): "CPK", "CPKMB", "TROPONINI", "MB" in the last 168 hours.    Electrolytes: Contraindicated  Accuchecks: Q4H    Gtts:   D10W   Intravenous Continuous PRN           LDA/Wounds:    Mykel Risk Assessment  Sensory Perception: 2-->very limited  Moisture: 3-->occasionally moist  Activity: 1-->bedfast  Mobility: 1-->completely immobile  Nutrition: 3-->adequate  Friction and Shear: 2-->potential problem  Mykel Score: 12    Is your mykel score 12 or less? yes enrolled in the [facility] program, mykel mobility score is 2 or less, they are on a immerse bed, and heel boots on      Nurses Note -- 4 Eyes    Is there altered skin present?  yes ; Right crani    Please check the following boxes that apply:   [x] LDA Added if Not in Epic (Describe Wound)   [x] New Altered Skin Integrity was Present on Admit and Documented in LDA   [x] Wound Image Taken    Wound Care Consulted? No     Second RN/Staff Member:        Restraints:   Restraint Order  Length of Order: Order good for next 24 hours or when removed.  Date that the current order will : 10/27/24  Time that the current order will : 1623  Order Upon Application: Yes    [facility]   "

## 2024-11-02 NOTE — ASSESSMENT & PLAN NOTE
83M PMHx of HTN, HLD, DM2, CAD s/p CABG x2, Afib on Coumadin, ILD on 4L of O2, HFrEF (45%), presenting as a transfer for a large traumatic R frontal IPH reversed with Kcentra and vitamin K with repeat INR 1.2.    Admit to NCC  q1h neuro checks, vitals, and I&O's  CBC, CMP, mag, and phos daily   Coags, TSH, A1c, lipid panel, UA  Echo, EKG, CXR  SBP <160  Na goals > 135  NSGY signed off  SCDs; heparin for VTE prophylaxis  PT/OT/SLP when extubated  Trach/PEG POD1, neuro exam unchanged at this time

## 2024-11-02 NOTE — PLAN OF CARE
Patient dispo plan pending. Weekend SW to follow for future needs or changes.     CAROLINA Olivares  [facility]   Ext: [phone]

## 2024-11-02 NOTE — PHYSICIAN QUERY
Please clarify the nature / etiology of the patient's hematological values:  Anemia, unspecified   Attending/Ramona Perez

## 2024-11-02 NOTE — PLAN OF CARE
"[facility] Care Plan    POC reviewed with Percy Ramirez and family at 0300. Family verbalized understanding. Questions and concerns addressed. No acute events today. Pt progressing toward goals. Will continue to monitor. See below and flowsheets for full assessment and VS info.             Is this a stroke patient? yes- Stroke booklet reviewed with family, risk factors identified for patient and stroke booklet remains at bedside for ongoing education.     Care individualization: [facility]    Neuro:  Federico Coma Scale  Best Eye Response: 1-->(E1) none  Best Motor Response: 3-->(M3) flexion to pain  Best Verbal Response: 1-->(V1) none  [place] Coma Scale Score: 5  Assessment Qualifiers: patient intubated  Pupil PERRLA: yes     24 hr Temp:  [97.5 °F (36.4 °C)-98.1 °F (36.7 °C)]     CV:   Rhythm: atrial rhythm  BP goals:   SBP < 160  MAP > 65    Resp:      Vent Mode: Spont  Set Rate: 16 BPM  Oxygen Concentration (%): 40  Vt Set: 500 mL  PEEP/CPAP: 5 cmH20  Pressure Support: 7 cmH20    Plan: trach in place    GI/:     Diet/Nutrition Received: NPO, tube feeding  Last Bowel Movement: 10/30/24  Voiding Characteristics: urethral catheter (bladder)    Intake/Output Summary (Last 24 hours) at 11/2/2024 0553  Last data filed at 11/2/2024 0400  Gross per 24 hour   Intake 655 ml   Output 1355 ml   Net -700 ml     Unmeasured Output  Urine Occurrence: 1  Stool Occurrence: 1  Pad Count: 1    Labs/Accuchecks:  Recent Labs   Lab 11/02/24  0009 11/02/24  0412   WBC 9.58  --    RBC 2.80*  --    HGB 7.7*  --    HCT 26.5* 21*     --       Recent Labs   Lab 11/02/24  0009      K 4.4   CO2 25      BUN 49*   CREATININE 1.7*   ALKPHOS 81   ALT 10   AST 19   BILITOT 0.3    No results for input(s): "PROTIME", "INR", "APTT", "HEPANTIXA" in the last 168 hours. No results for input(s): "CPK", "CPKMB", "TROPONINI", "MB" in the last 168 hours.    Electrolytes: N/A - electrolytes WDL  Accuchecks: Q4H    Gtts:   D10W   Intravenous " Continuous PRN           LDA/Wounds:    Mykel Risk Assessment  Sensory Perception: 2-->very limited  Moisture: 3-->occasionally moist  Activity: 1-->bedfast  Mobility: 1-->completely immobile  Nutrition: 3-->adequate  Friction and Shear: 2-->potential problem  Mykel Score: 12  Is your mykel score 12 or less? no    Nurses Note -- 4 Eyes    Is there altered skin present?  No  Second RN/Staff Member:  Molina RN      Restraints:   Restraint Order  Length of Order: Order good for next 24 hours or when removed.  Date that the current order will : 10/27/24  Time that the current order will :   Order Upon Application: Yes    [facility]

## 2024-11-02 NOTE — SUBJECTIVE & OBJECTIVE
Interval History:      NAEON. Remaining stable.     Objective:     Vitals:  Temp: 97.8 °F (36.6 °C)  Pulse: 78  Rhythm: atrial rhythm  BP: 128/64  MAP (mmHg): 88  Resp: 11  SpO2: 98 %  Oxygen Concentration (%): 32  Vent Mode: Spont  Pressure Support: 5 cmH20  PEEP/CPAP: 5 cmH20  Peak Airway Pressure: 10 cmH20  Mean Airway Pressure: 6.1 cmH20  Plateau Pressure: 13 cmH20    Temp  Min: 97.5 °F (36.4 °C)  Max: 98 °F (36.7 °C)  Pulse  Min: 61  Max: 86  BP  Min: 125/66  Max: 166/79  MAP (mmHg)  Min: 84  Max: 113  Resp  Min: 10  Max: 17  SpO2  Min: 95 %  Max: 100 %  Oxygen Concentration (%)  Min: 32  Max: 40    11/01 0701 - 11/02 0700  In: 705   Out: 1400 [Urine:1400]   Unmeasured Output  Urine Occurrence: 1  Stool Occurrence: 1  Pad Count: 1        Physical Exam  Constitutional:       General: He is not in acute distress.     Appearance: He is not toxic-appearing.   Cardiovascular:      Comments: Irregularly irreguar  Pulmonary:      Effort: No respiratory distress.      Breath sounds: Normal breath sounds.   Abdominal:      General: There is no distension.      Palpations: Abdomen is soft.   Skin:     General: Skin is warm and dry.     Remains unarousable.       Medications:  [id number]    Scheduledaspirin, 81 mg, Daily  atorvastatin, 40 mg, Daily  carvediloL, 3.125 mg, BID  DULoxetine, 60 mg, Daily  famotidine, 20 mg, Daily  heparin (porcine), 5,000 Units, Q8H  insulin aspart U-100, 7 Units, 6 times per day  insulin glargine U-100, 20 Units, Daily  lacosamide, 200 mg, Q12H  levetiracetam, 750 mg, BID  senna-docusate 8.6-50 mg, 1 tablet, BID  silodosin, 4 mg, Daily  sodium chloride 3%, 4 mL, Q6H    PRNacetaminophen, 650 mg, Q6H PRN  D10W, , Continuous PRN  dextrose 10%, 12.5 g, PRN  dextrose 10%, 25 g, PRN  glucagon (human recombinant), 1 mg, PRN  hydrALAZINE, 10 mg, Q4H PRN  insulin aspart U-100, 0-10 Units, Q4H PRN  labetalol, 10 mg, Q4H PRN  ondansetron, 4 mg, Q8H PRN      Today I personally reviewed pertinent  medications, laboratory results, notably:    Diet  Diet NPO Except for: Medication

## 2024-11-03 LAB
ALBUMIN SERPL BCP-MCNC: 2 G/DL (ref 3.5–5.2)
ALLENS TEST: ABNORMAL
ALP SERPL-CCNC: 96 U/L (ref 40–150)
ALT SERPL W/O P-5'-P-CCNC: 12 U/L (ref 10–44)
ANION GAP SERPL CALC-SCNC: 11 MMOL/L (ref 8–16)
AST SERPL-CCNC: 19 U/L (ref 10–40)
BASOPHILS # BLD AUTO: 0.04 K/UL (ref 0–0.2)
BASOPHILS NFR BLD: 0.5 % (ref 0–1.9)
BILIRUB SERPL-MCNC: 0.3 MG/DL (ref 0.1–1)
BUN SERPL-MCNC: 55 MG/DL (ref 8–23)
CALCIUM SERPL-MCNC: 8.8 MG/DL (ref 8.7–10.5)
CHLORIDE SERPL-SCNC: 115 MMOL/L (ref 95–110)
CO2 SERPL-SCNC: 23 MMOL/L (ref 23–29)
CREAT SERPL-MCNC: 1.7 MG/DL (ref 0.5–1.4)
DELSYS: ABNORMAL
DIFFERENTIAL METHOD BLD: ABNORMAL
EOSINOPHIL # BLD AUTO: 0.3 K/UL (ref 0–0.5)
EOSINOPHIL NFR BLD: 3 % (ref 0–8)
ERYTHROCYTE [DISTWIDTH] IN BLOOD BY AUTOMATED COUNT: 17.5 % (ref 11.5–14.5)
EST. GFR  (NO RACE VARIABLE): 39.8 ML/MIN/1.73 M^2
FIO2: 32
GLUCOSE SERPL-MCNC: 179 MG/DL (ref 70–110)
HCO3 UR-SCNC: 28.2 MMOL/L (ref 24–28)
HCT VFR BLD AUTO: 27.6 % (ref 40–54)
HGB BLD-MCNC: 7.9 G/DL (ref 14–18)
IMM GRANULOCYTES # BLD AUTO: 0.14 K/UL (ref 0–0.04)
IMM GRANULOCYTES NFR BLD AUTO: 1.7 % (ref 0–0.5)
LYMPHOCYTES # BLD AUTO: 0.6 K/UL (ref 1–4.8)
LYMPHOCYTES NFR BLD: 6.7 % (ref 18–48)
MAGNESIUM SERPL-MCNC: 3 MG/DL (ref 1.6–2.6)
MCH RBC QN AUTO: 27.2 PG (ref 27–31)
MCHC RBC AUTO-ENTMCNC: 28.6 G/DL (ref 32–36)
MCV RBC AUTO: 95 FL (ref 82–98)
MODE: ABNORMAL
MONOCYTES # BLD AUTO: 0.8 K/UL (ref 0.3–1)
MONOCYTES NFR BLD: 9.4 % (ref 4–15)
NEUTROPHILS # BLD AUTO: 6.6 K/UL (ref 1.8–7.7)
NEUTROPHILS NFR BLD: 78.7 % (ref 38–73)
NRBC BLD-RTO: 0 /100 WBC
PCO2 BLDA: 43.7 MMHG (ref 35–45)
PEEP: 5
PH SMN: 7.42 [PH] (ref 7.35–7.45)
PHOSPHATE SERPL-MCNC: 3.9 MG/DL (ref 2.7–4.5)
PLATELET # BLD AUTO: 248 K/UL (ref 150–450)
PMV BLD AUTO: 10.7 FL (ref 9.2–12.9)
PO2 BLDA: 67 MMHG (ref 80–100)
POC BE: 4 MMOL/L
POC SATURATED O2: 93 % (ref 95–100)
POC TCO2: 30 MMOL/L (ref 23–27)
POCT GLUCOSE: 137 MG/DL (ref 70–110)
POCT GLUCOSE: 162 MG/DL (ref 70–110)
POCT GLUCOSE: 206 MG/DL (ref 70–110)
POCT GLUCOSE: 248 MG/DL (ref 70–110)
POCT GLUCOSE: 264 MG/DL (ref 70–110)
POCT GLUCOSE: 270 MG/DL (ref 70–110)
POTASSIUM SERPL-SCNC: 4.5 MMOL/L (ref 3.5–5.1)
PROT SERPL-MCNC: 6.2 G/DL (ref 6–8.4)
PS: 5
RBC # BLD AUTO: 2.9 M/UL (ref 4.6–6.2)
SAMPLE: ABNORMAL
SITE: ABNORMAL
SODIUM SERPL-SCNC: 149 MMOL/L (ref 136–145)
SP02: 95
WBC # BLD AUTO: 8.33 K/UL (ref 3.9–12.7)

## 2024-11-03 PROCEDURE — 94668 MNPJ CHEST WALL SBSQ: CPT

## 2024-11-03 PROCEDURE — 80053 COMPREHEN METABOLIC PANEL: CPT

## 2024-11-03 PROCEDURE — 84100 ASSAY OF PHOSPHORUS: CPT

## 2024-11-03 PROCEDURE — 25000003 PHARM REV CODE 250: Performed by: INTERNAL MEDICINE

## 2024-11-03 PROCEDURE — 94761 N-INVAS EAR/PLS OXIMETRY MLT: CPT

## 2024-11-03 PROCEDURE — 82803 BLOOD GASES ANY COMBINATION: CPT

## 2024-11-03 PROCEDURE — 25000003 PHARM REV CODE 250

## 2024-11-03 PROCEDURE — 94640 AIRWAY INHALATION TREATMENT: CPT

## 2024-11-03 PROCEDURE — 99900035 HC TECH TIME PER 15 MIN (STAT)

## 2024-11-03 PROCEDURE — 85025 COMPLETE CBC W/AUTO DIFF WBC: CPT

## 2024-11-03 PROCEDURE — 94003 VENT MGMT INPAT SUBQ DAY: CPT

## 2024-11-03 PROCEDURE — 63600175 PHARM REV CODE 636 W HCPCS

## 2024-11-03 PROCEDURE — 36600 WITHDRAWAL OF ARTERIAL BLOOD: CPT

## 2024-11-03 PROCEDURE — 99291 CRITICAL CARE FIRST HOUR: CPT | Mod: GC,,, | Performed by: INTERNAL MEDICINE

## 2024-11-03 PROCEDURE — 27100171 HC OXYGEN HIGH FLOW UP TO 24 HOURS

## 2024-11-03 PROCEDURE — 20000000 HC ICU ROOM

## 2024-11-03 PROCEDURE — 51798 US URINE CAPACITY MEASURE: CPT

## 2024-11-03 PROCEDURE — 99900026 HC AIRWAY MAINTENANCE (STAT)

## 2024-11-03 PROCEDURE — 83735 ASSAY OF MAGNESIUM: CPT

## 2024-11-03 PROCEDURE — 63600175 PHARM REV CODE 636 W HCPCS: Mod: JZ,JG

## 2024-11-03 PROCEDURE — 25000242 PHARM REV CODE 250 ALT 637 W/ HCPCS

## 2024-11-03 PROCEDURE — 51701 INSERT BLADDER CATHETER: CPT

## 2024-11-03 RX ORDER — SILODOSIN 8 MG/1
8 CAPSULE ORAL DAILY
Status: DISCONTINUED | OUTPATIENT
Start: 2024-11-04 | End: 2024-11-08 | Stop reason: HOSPADM

## 2024-11-03 RX ORDER — INSULIN ASPART 100 [IU]/ML
9 INJECTION, SOLUTION INTRAVENOUS; SUBCUTANEOUS
Status: DISCONTINUED | OUTPATIENT
Start: 2024-11-03 | End: 2024-11-08 | Stop reason: HOSPADM

## 2024-11-03 RX ORDER — INSULIN GLARGINE 100 [IU]/ML
25 INJECTION, SOLUTION SUBCUTANEOUS DAILY
Status: DISCONTINUED | OUTPATIENT
Start: 2024-11-03 | End: 2024-11-08 | Stop reason: HOSPADM

## 2024-11-03 RX ORDER — AMOXICILLIN 250 MG
2 CAPSULE ORAL 2 TIMES DAILY
Status: DISCONTINUED | OUTPATIENT
Start: 2024-11-03 | End: 2024-11-08 | Stop reason: HOSPADM

## 2024-11-03 RX ADMIN — ASPIRIN 81 MG CHEWABLE TABLET 81 MG: 81 TABLET CHEWABLE at 09:11

## 2024-11-03 RX ADMIN — DULOXETINE HYDROCHLORIDE 60 MG: 60 CAPSULE, DELAYED RELEASE ORAL at 09:11

## 2024-11-03 RX ADMIN — LACOSAMIDE 200 MG: 50 TABLET, FILM COATED ORAL at 08:11

## 2024-11-03 RX ADMIN — SODIUM CHLORIDE SOLN NEBU 3% 4 ML: 3 NEBU SOLN at 08:11

## 2024-11-03 RX ADMIN — LACOSAMIDE 200 MG: 50 TABLET, FILM COATED ORAL at 09:11

## 2024-11-03 RX ADMIN — LEVETIRACETAM 750 MG: 500 SOLUTION ORAL at 08:11

## 2024-11-03 RX ADMIN — FAMOTIDINE 20 MG: 20 TABLET ORAL at 09:11

## 2024-11-03 RX ADMIN — INSULIN ASPART 9 UNITS: 100 INJECTION, SOLUTION INTRAVENOUS; SUBCUTANEOUS at 08:11

## 2024-11-03 RX ADMIN — LABETALOL HYDROCHLORIDE 10 MG: 5 INJECTION, SOLUTION INTRAVENOUS at 05:11

## 2024-11-03 RX ADMIN — CARVEDILOL 3.12 MG: 3.12 TABLET, FILM COATED ORAL at 09:11

## 2024-11-03 RX ADMIN — INSULIN ASPART 7 UNITS: 100 INJECTION, SOLUTION INTRAVENOUS; SUBCUTANEOUS at 12:11

## 2024-11-03 RX ADMIN — INSULIN ASPART 4 UNITS: 100 INJECTION, SOLUTION INTRAVENOUS; SUBCUTANEOUS at 12:11

## 2024-11-03 RX ADMIN — INSULIN ASPART 2 UNITS: 100 INJECTION, SOLUTION INTRAVENOUS; SUBCUTANEOUS at 04:11

## 2024-11-03 RX ADMIN — INSULIN ASPART 7 UNITS: 100 INJECTION, SOLUTION INTRAVENOUS; SUBCUTANEOUS at 04:11

## 2024-11-03 RX ADMIN — SODIUM CHLORIDE SOLN NEBU 3% 4 ML: 3 NEBU SOLN at 12:11

## 2024-11-03 RX ADMIN — INSULIN ASPART 9 UNITS: 100 INJECTION, SOLUTION INTRAVENOUS; SUBCUTANEOUS at 12:11

## 2024-11-03 RX ADMIN — SENNOSIDES AND DOCUSATE SODIUM 2 TABLET: 50; 8.6 TABLET ORAL at 08:11

## 2024-11-03 RX ADMIN — INSULIN ASPART 6 UNITS: 100 INJECTION, SOLUTION INTRAVENOUS; SUBCUTANEOUS at 09:11

## 2024-11-03 RX ADMIN — SODIUM CHLORIDE SOLN NEBU 3% 4 ML: 3 NEBU SOLN at 07:11

## 2024-11-03 RX ADMIN — INSULIN ASPART 3 UNITS: 100 INJECTION, SOLUTION INTRAVENOUS; SUBCUTANEOUS at 04:11

## 2024-11-03 RX ADMIN — LEVETIRACETAM 750 MG: 500 SOLUTION ORAL at 09:11

## 2024-11-03 RX ADMIN — HEPARIN SODIUM 5000 UNITS: 5000 INJECTION INTRAVENOUS; SUBCUTANEOUS at 02:11

## 2024-11-03 RX ADMIN — SENNOSIDES AND DOCUSATE SODIUM 1 TABLET: 50; 8.6 TABLET ORAL at 09:11

## 2024-11-03 RX ADMIN — HEPARIN SODIUM 5000 UNITS: 5000 INJECTION INTRAVENOUS; SUBCUTANEOUS at 10:11

## 2024-11-03 RX ADMIN — SILODOSIN 4 MG: 4 CAPSULE ORAL at 09:11

## 2024-11-03 RX ADMIN — CARVEDILOL 3.12 MG: 3.12 TABLET, FILM COATED ORAL at 08:11

## 2024-11-03 RX ADMIN — INSULIN ASPART 9 UNITS: 100 INJECTION, SOLUTION INTRAVENOUS; SUBCUTANEOUS at 04:11

## 2024-11-03 RX ADMIN — HEPARIN SODIUM 5000 UNITS: 5000 INJECTION INTRAVENOUS; SUBCUTANEOUS at 05:11

## 2024-11-03 RX ADMIN — INSULIN ASPART 2 UNITS: 100 INJECTION, SOLUTION INTRAVENOUS; SUBCUTANEOUS at 12:11

## 2024-11-03 RX ADMIN — INSULIN ASPART 7 UNITS: 100 INJECTION, SOLUTION INTRAVENOUS; SUBCUTANEOUS at 09:11

## 2024-11-03 RX ADMIN — ATORVASTATIN CALCIUM 40 MG: 40 TABLET, FILM COATED ORAL at 09:11

## 2024-11-03 RX ADMIN — INSULIN GLARGINE 25 UNITS: 100 INJECTION, SOLUTION SUBCUTANEOUS at 09:11

## 2024-11-03 NOTE — PLAN OF CARE
Patient dispo plan pending. Weekend SW to follow for future needs or changes.      CAROLINA Olivares  [facility]   Ext: [phone]

## 2024-11-03 NOTE — ASSESSMENT & PLAN NOTE
Hyperkalemia is likely due to LEANDRO.The patients most recent potassium results are listed below.  Recent Labs     11/01/24  2030 11/02/24  0009 11/03/24  0121   K 4.6 4.4 4.5     Plan  - Monitor for arrhythmias with EKG and/or continuous telemetry.   - Treat the hyperkalemia with Potassium Binders.   - Monitor potassium: Daily  - The patient's hyperkalemia is resolved

## 2024-11-03 NOTE — SUBJECTIVE & OBJECTIVE
Interval History:  see hospital course    Review of Systems   Unable to perform ROS: Intubated       Objective:     Vitals:  Temp: 98.2 °F (36.8 °C)  Pulse: 89  Rhythm: atrial rhythm  BP: (!) 154/69  MAP (mmHg): 99  Resp: 18  SpO2: (!) 93 %  Oxygen Concentration (%): 40  Vent Mode: Spont  Pressure Support: 5 cmH20  PEEP/CPAP: 5 cmH20  Peak Airway Pressure: 9.5 cmH20  Mean Airway Pressure: 6.5 cmH20  Plateau Pressure: 13 cmH20    Temp  Min: 97.8 °F (36.6 °C)  Max: 98.7 °F (37.1 °C)  Pulse  Min: 70  Max: 96  BP  Min: 89/55  Max: 170/79  MAP (mmHg)  Min: 80  Max: 106  Resp  Min: 11  Max: 23  SpO2  Min: 93 %  Max: 100 %  Oxygen Concentration (%)  Min: 32  Max: 40    11/02 0701 - 11/03 0700  In: 1120 [I.V.:20]  Out: 1215 [Urine:1215]   Unmeasured Output  Urine Occurrence: 1  Stool Occurrence: 0  Pad Count: 1        Physical Exam  Vitals and nursing note reviewed.   Constitutional:       General: He is not in acute distress.     Appearance: He is not toxic-appearing.   Eyes:      Conjunctiva/sclera: Conjunctivae normal.      Pupils: Pupils are equal, round, and reactive to light.   Cardiovascular:      Comments: Irregularly irregular  Pulmonary:      Effort: No respiratory distress.      Comments: Trach on vent  Abdominal:      General: There is no distension.      Palpations: Abdomen is soft.      Tenderness: There is no abdominal tenderness.      Comments: PEG in place   Musculoskeletal:         General: Swelling (UE) present.   Skin:     General: Skin is dry.   Neurological:      Comments: Unarousable  PERRL  +Gag  BLE flex to pain          Medications:  [id number]    Scheduledaspirin, 81 mg, Daily  atorvastatin, 40 mg, Daily  carvediloL, 3.125 mg, BID  DULoxetine, 60 mg, Daily  famotidine, 20 mg, Daily  heparin (porcine), 5,000 Units, Q8H  insulin aspart U-100, 9 Units, 6 times per day  insulin glargine U-100, 25 Units, Daily  lacosamide, 200 mg, Q12H  levetiracetam, 750 mg, BID  senna-docusate 8.6-50 mg, 2  tablet, BID  [START ON 11/4/2024] silodosin, 8 mg, Daily  sodium chloride 3%, 4 mL, Q6H    PRNacetaminophen, 650 mg, Q6H PRN  D10W, , Continuous PRN  dextrose 10%, 12.5 g, PRN  dextrose 10%, 25 g, PRN  glucagon (human recombinant), 1 mg, PRN  hydrALAZINE, 10 mg, Q4H PRN  insulin aspart U-100, 0-10 Units, Q4H PRN  labetalol, 10 mg, Q4H PRN  ondansetron, 4 mg, Q8H PRN      Today I personally reviewed pertinent medications, lines/drains/airways, imaging, laboratory results, notably:    Diet  Diet NPO Except for: Medication  Diet NPO Except for: Medication

## 2024-11-03 NOTE — ASSESSMENT & PLAN NOTE
LEANDRO on CKD, Cr 1.8 on initial presentation to ED    Recent Labs     11/01/24  0833 11/02/24  0009 11/03/24  0121   CREATININE 1.6* 1.7* 1.7*   EGFRNORACEVR 42.8* 39.8* 39.8*        Plan  - LEANDRO is resolved, increased to 1.7  - Avoid nephrotoxins and renally dose meds for GFR listed above  - Monitor urine output, serial BMP, and adjust therapy as needed  - Q1H I&Os  - Free water boluses added

## 2024-11-03 NOTE — ASSESSMENT & PLAN NOTE
Creatine stable for now. BMP reviewed- noted Estimated Creatinine Clearance: 35.3 mL/min (A) (based on SCr of 1.7 mg/dL (H)). according to latest data. Based on current GFR, CKD stage is stage 3 - GFR 30-59.  Monitor UOP and serial BMP and adjust therapy as needed. Renally dose meds. Avoid nephrotoxic medications and procedures.

## 2024-11-03 NOTE — PROGRESS NOTES
[facility] - Neuro Critical Care  Neurocritical Care  Progress Note    Admit Date: 10/17/2024  Service Date: 11/03/2024  Length of Stay: 17    Subjective:     Chief Complaint: Traumatic right-sided intracerebral hemorrhage without loss of consciousness    History of Present Illness: Percy Ramirez is a 83M PMHx of HTN, HLD, DM2, CAD s/p CABG x2, Afib on Coumadin, ILD on 4L of O2, HFrEF (45%), presenting as a transfer for a large traumatic R frontal IPH. [place] [facility] [place] fall and was found to have 4mm parafalcine SDH. Repeat CTH was stable. Pt was discharged home on keppra and instructed to hold his blood thinners. On 10/17/24 pt was lethargic and fell. He was brought back to  ED. Blood glucose was >600 and he was febrile (101F). CTH revealed 5cm R frontal IPH with 7mm MLS. Kcentra, 10mg vitamin K, and 3% HTS bolus were given. He was transferred to [facility] for higher level of care.     Hospital Course: 10/19/2024: POD #1 from both his MI LS as well as his right subdural/intraparenchymal hemorrhage evacuation.  He is still intubated and although sedation is off, he is minimally responsive. Coreg doubled. NG tube placed and tube feeds started. Still on insulin drip.  10/20/2024: POD #2 s/p crani for SDH evacuation. Leukocytosis noted post-op, no accompanying fever/chills. Straight cath x1. Tachycardia responding to IVF boluses. Tube feeds started, will titrate to goal. Insulin gtt continued, plans to transition to subQ tomorrow.  10/21/2021: POD#3. EEG placed this morning, revealing multiple r-sided seizures. Keppra load completed and maintenance dose increased. Concern for LUE no longer WD, stat head CT showing new/increased hyperdensity in resection bed w/o worsening mass effect or MLS. 1L LR given for LEANDRO. Will continue to monitor Na.   10/22/24: POD#4. Addl seizures despite keppra load. Vimpat and propofol started yesterday with significant reduction in number.  1U PRBC transfused overnight. Required levo for short period of time after initiation of propofol.  10/23/24: POD#5.  Patient continues to be hyponatremic.  Free water volume increased.  Patient with total of 11 seizures yesterday.  Vimpat and propofol dosing increased with improvement.  10/24/24: POD#6. NGT adjusted overnight. No additional seizures on current AEM regimen. Platelets and Hgb decreasing, will order peripheral smear to assess for HIT  10/25/2024 POD#7. Addl seizure, Onfi added to AEM regimen. Drains removed by NSGY. Will attempt to wean propofol tomorrow.   10/26/2024 POD#8. MRI complete yx. Weaning propofol today.   10/27/2024 POD#9. Patient began having seizures again overnight following cessation of propofol gtt. Resumed propofol at 10cc/hr for now, increased Keppra dose, and plan to attempt to wean again tomorrow. Persistent hyperglycemia on tube feeds, optimizing insulin regimen. Leukocytosis pending respiratory cultures.  10/28/2024 POD10. No addl seizures overnight. Propofol turned off this morning. Clobazam dosing decreased as well. Resp cultures negative, but will obtain blood and urine cultures. Cont abx. NSGY signing off.   10/29/2024 POD11. No seizures. Clobazam down to 10mg QHS. Infiltrate developing RLL, sputum culture likely due to colonization of resp tract per ID.   10/30/2024 POD12. No seizures. Clobazam discontinued. Abx discontinued, as no significant sputum production and resp status is stable. Discussion with family today - trach/PEG likely tomorrow.   10/31/2024 POD13. Single apneic episode yx evening with no EEG correlation. Spontaneously resolved. To OR today for trach/PEG. EEG cap to be removed.   11/1/24 POD14. Hyperkalemia overnight, resolving with lokelma and albuterol 10mg x1.   11/2/24 POD15. NAEON. Remains stable.  11/3/24 POD16. NAEON. Stable. Increased silodosin and optimized bowel regimen. Trach collar trials daily.    Interval History:  see hospital course    Review  of Systems   Unable to perform ROS: Intubated       Objective:     Vitals:  Temp: 98.2 °F (36.8 °C)  Pulse: 89  Rhythm: atrial rhythm  BP: (!) 154/69  MAP (mmHg): 99  Resp: 18  SpO2: (!) 93 %  Oxygen Concentration (%): 40  Vent Mode: Spont  Pressure Support: 5 cmH20  PEEP/CPAP: 5 cmH20  Peak Airway Pressure: 9.5 cmH20  Mean Airway Pressure: 6.5 cmH20  Plateau Pressure: 13 cmH20    Temp  Min: 97.8 °F (36.6 °C)  Max: 98.7 °F (37.1 °C)  Pulse  Min: 70  Max: 96  BP  Min: 89/55  Max: 170/79  MAP (mmHg)  Min: 80  Max: 106  Resp  Min: 11  Max: 23  SpO2  Min: 93 %  Max: 100 %  Oxygen Concentration (%)  Min: 32  Max: 40    11/02 0701 - 11/03 0700  In: 1120 [I.V.:20]  Out: 1215 [Urine:1215]   Unmeasured Output  Urine Occurrence: 1  Stool Occurrence: 0  Pad Count: 1        Physical Exam  Vitals and nursing note reviewed.   Constitutional:       General: He is not in acute distress.     Appearance: He is not toxic-appearing.   Eyes:      Conjunctiva/sclera: Conjunctivae normal.      Pupils: Pupils are equal, round, and reactive to light.   Cardiovascular:      Comments: Irregularly irregular  Pulmonary:      Effort: No respiratory distress.      Comments: Trach on vent  Abdominal:      General: There is no distension.      Palpations: Abdomen is soft.      Tenderness: There is no abdominal tenderness.      Comments: PEG in place   Musculoskeletal:         General: Swelling (UE) present.   Skin:     General: Skin is dry.   Neurological:      Comments: Unarousable  PERRL  +Gag  BLE flex to pain          Medications:  [id number]    Scheduledaspirin, 81 mg, Daily  atorvastatin, 40 mg, Daily  carvediloL, 3.125 mg, BID  DULoxetine, 60 mg, Daily  famotidine, 20 mg, Daily  heparin (porcine), 5,000 Units, Q8H  insulin aspart U-100, 9 Units, 6 times per day  insulin glargine U-100, 25 Units, Daily  lacosamide, 200 mg, Q12H  levetiracetam, 750 mg, BID  senna-docusate 8.6-50 mg, 2 tablet, BID  [START ON 11/4/2024] silodosin, 8 mg,  Daily  sodium chloride 3%, 4 mL, Q6H    PRNacetaminophen, 650 mg, Q6H PRN  D10W, , Continuous PRN  dextrose 10%, 12.5 g, PRN  dextrose 10%, 25 g, PRN  glucagon (human recombinant), 1 mg, PRN  hydrALAZINE, 10 mg, Q4H PRN  insulin aspart U-100, 0-10 Units, Q4H PRN  labetalol, 10 mg, Q4H PRN  ondansetron, 4 mg, Q8H PRN      Today I personally reviewed pertinent medications, lines/drains/airways, imaging, laboratory results, notably:    Diet  Diet NPO Except for: Medication  Diet NPO Except for: Medication    Assessment/Plan:     Neuro  * Traumatic right-sided intracerebral hemorrhage without loss of consciousness  83M PMHx of HTN, HLD, DM2, CAD s/p CABG x2, Afib on Coumadin, ILD on 4L of O2, HFrEF (45%), presenting as a transfer for a large traumatic R frontal IPH reversed with Kcentra and vitamin K with repeat INR 1.2.    Admit to NCC  q1h neuro checks, vitals, and I&O's  CBC, CMP, mag, and phos daily   Coags, TSH, A1c, lipid panel, UA  Echo, EKG, CXR  SBP <160  Na goals > 135  NSGY signed off  SCDs; heparin for VTE prophylaxis  PT/OT/SLP when extubated  S/p Trach/PEG, neuro exam unchanged at this time    Altered mental status  See primary problem    Intracranial hemorrhage  See primary problem    Subdural hematoma  See primary problem    Focal seizures  EEG placed morning of 10/21 due to delay in return to expected LOC  -Multiple seizures throughout the day  -Has been loaded with keppra and vimpat  -Addl seizure evening of 10/24, Onfi added on.   -Current AED regimen:   - Keppra 750 BID   - Vimpat 200 BID  -No seizures 10/31  -Keppra dose reduced to 750mg BID to renally dose; EEG cap removed    Psychiatric  Major depressive disorder, recurrent episode, mild  Restarting home duloxetine.    Pulmonary  ILD (interstitial lung disease)  ILD on 4L of O2 at home    SpO2 goal > 88%  Duo nebs and tiotropium  Currently on trach  Will add chest physiotherapy and HTN nebs to help thin secretions    Cardiac/Vascular  Chronic  combined systolic and diastolic heart failure  06/29/2024 echo with EF 45%, was previously 30-40%    Repeat echo shows his EF is still 45%  HDS    Persistent atrial fibrillation  In atrial fibrillation on admit  Hold Coumadin in setting of acute intraparenchymal hemorrhage  Heparin subcu for VTE prophylaxis    Hyperlipidemia LDL goal <100  Lipid panel showed low LDL, low HDL, low total cholesterol  Atorvastatin    Coronary artery disease  S/p CABG x2 in 2004  Patient has reportedly not been on Plavix recently because of his Coumadin  -Aspirin      Benign hypertension with chronic kidney disease, stage III  SBP goal < 160  PRN labetalol and hydralazine   Home meds as needed for HTN      Renal/  Hyperkalemia  Hyperkalemia is likely due to LEANDRO.The patients most recent potassium results are listed below.  Recent Labs     11/01/24  2030 11/02/24  0009 11/03/24  0121   K 4.6 4.4 4.5     Plan  - Monitor for arrhythmias with EKG and/or continuous telemetry.   - Treat the hyperkalemia with Potassium Binders.   - Monitor potassium: Daily  - The patient's hyperkalemia is resolved      LEANDRO (acute kidney injury)  LEANDRO on CKD, Cr 1.8 on initial presentation to ED    Recent Labs     11/01/24  0833 11/02/24  0009 11/03/24  0121   CREATININE 1.6* 1.7* 1.7*   EGFRNORACEVR 42.8* 39.8* 39.8*        Plan  - LEANDRO is resolved, increased to 1.7  - Avoid nephrotoxins and renally dose meds for GFR listed above  - Monitor urine output, serial BMP, and adjust therapy as needed  - Q1H I&Os  - Free water boluses added    Chronic kidney disease, stage 3a  Creatine stable for now. BMP reviewed- noted Estimated Creatinine Clearance: 35.3 mL/min (A) (based on SCr of 1.7 mg/dL (H)). according to latest data. Based on current GFR, CKD stage is stage 3 - GFR 30-59.  Monitor UOP and serial BMP and adjust therapy as needed. Renally dose meds. Avoid nephrotoxic medications and procedures.    Oncology  Leukocytosis  Elevated WBC 13.5 today. Procal elevated  at 0.82.  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 Beta hydroxybutyrate and urine ketones negative. Serum CO2 22. Given SQ insulin at OSH.  On arrival to [facility] [place], . Hb A1c 9.9% on admit.     Resuming scheduled insulin.    Patient's FSGs are uncontrolled due to hyperglycemia on current medication regimen.  Last A1c reviewed-   Lab Results   Component Value Date    LABA1C 6.8 10/09/2017    HGBA1C 9.9 (H) 10/17/2024     Most recent fingerstick glucose reviewed-   Recent Labs   Lab 11/03/24  0024 11/03/24  0428 11/03/24  0859 11/03/24  1234   POCTGLUCOSE 206* 264* 270* 248*       Current correctional scale  Medium  Increase anti-hyperglycemic dose as follows-   Antihyperglycemics (From admission, onward)      Start     Stop Route Frequency Ordered    11/03/24 1200  insulin aspart U-100 pen 9 Units         -- SubQ 6 times per day 11/03/24 1050    11/03/24 0900  insulin glargine U-100 (Lantus) pen 25 Units         -- SubQ Daily 11/03/24 0754    10/25/24 1133  insulin aspart U-100 pen 0-10 Units         -- SubQ Every 4 hours PRN 10/25/24 1036    10/22/24 1400  insulin regular in 0.9 % NaCl 100 unit/100 mL (1 unit/mL) infusion        Question Answer Comment   Insulin Rate Adjustment (DO NOT MODIFY ANSWER) [email or web address] [id number]    Enter initial dose from Infusion Protocol Chart (Units/hr): 1.5        10/25/24 1431 IV Continuous 10/22/24 1252    10/21/24 1145  insulin regular in 0.9 % NaCl 100 unit/100 mL (1 unit/mL) infusion        Question:  Enter initial dose from Infusion Protocol Chart (Units/hr):  Answer:   1.65    10/21/24 1343 IV Continuous 10/21/24 1042          Hold Oral hypoglycemics while patient is in the hospital.      Other  Obstructive sleep apnea treated with BiPAP  Currently with trach on vent  Daily ABG  Daily CXR          The patient is being Prophylaxed for:  Venous Thromboembolism with: Mechanical or Chemical  Stress Ulcer with: H2B  Ventilator Pneumonia with: chlorhexidine oral care    Activity Orders            Elevate HOB starting at 10/31 1445    Diet NPO Except for: Medication: NPO starting at 10/31 0001    Turn patient starting at 10/18 0000          Full Code    Khris Cote MD  Neurocritical Care  [facility] - Neuro Critical Care

## 2024-11-03 NOTE — LOPA/MORA/SWTA/AOC/AEB
[facility] ([facility])  On-Site Evaluation  [facility] Contact # [phone]        Thank you for the referral of this patient to determine suitability for organ, tissue, and eye donation.  A chart review has been conducted 11/01/2024.        ? Referral Closed- Any changes in patients condition, brain death exams, or family mention of donation immediately call [phone].      [facility] Representative:  Emily Abadie BS, MS           [facility] Referral Number:   [id number]

## 2024-11-03 NOTE — PLAN OF CARE
"[facility] Care Plan    POC reviewed with Percy Ramirez and family at 0300. Family verbalized understanding. Questions and concerns addressed. No acute events today. Pt progressing toward goals. Will continue to monitor. See below and flowsheets for full assessment and VS info.             Is this a stroke patient? yes- Stroke booklet reviewed with patient, risk factors identified for patient and stroke booklet remains at bedside for ongoing education.     Care individualization: tube feeding     Neuro:  [place] Coma Scale  Best Eye Response: 1-->(E1) none  Best Motor Response: 3-->(M3) flexion to pain  Best Verbal Response: 1-->(V1) none  [place] Coma Scale Score: 5  Assessment Qualifiers: patient intubated  Pupil PERRLA: yes     24 hr Temp:  [97.7 °F (36.5 °C)-98 °F (36.7 °C)]     CV:   Rhythm: atrial rhythm  BP goals:   SBP < 160  MAP > 65    Resp:      Vent Mode: Spont  Set Rate: 16 BPM  Oxygen Concentration (%): 32  Vt Set: 500 mL  PEEP/CPAP: 5 cmH20  Pressure Support: 5 cmH20    Plan: trach in place    GI/:     Diet/Nutrition Received: tube feeding  Last Bowel Movement: 11/01/24  Voiding Characteristics: urethral catheter (bladder)    Intake/Output Summary (Last 24 hours) at 11/3/2024 0621  Last data filed at 11/3/2024 0500  Gross per 24 hour   Intake 1120 ml   Output 1215 ml   Net -95 ml     Unmeasured Output  Urine Occurrence: 1  Stool Occurrence: 1  Pad Count: 1    Labs/Accuchecks:  Recent Labs   Lab 11/03/24  0121   WBC 8.33   RBC 2.90*   HGB 7.9*   HCT 27.6*         Recent Labs   Lab 11/03/24  0121   *   K 4.5   CO2 23   *   BUN 55*   CREATININE 1.7*   ALKPHOS 96   ALT 12   AST 19   BILITOT 0.3    No results for input(s): "PROTIME", "INR", "APTT", "HEPANTIXA" in the last 168 hours. No results for input(s): "CPK", "CPKMB", "TROPONINI", "MB" in the last 168 hours.    Electrolytes: N/A - electrolytes WDL  Accuchecks: Q4H    Gtts:   D10W   Intravenous Continuous PRN     "       LDA/Wounds:    Mykel Risk Assessment  Sensory Perception: 2-->very limited  Moisture: 4-->rarely moist  Activity: 1-->bedfast  Mobility: 1-->completely immobile  Nutrition: 3-->adequate  Friction and Shear: 2-->potential problem  Mykel Score: 13  Is your mykel score 12 or less? no    Nurses Note -- 4 Eyes    Is there altered skin present?  No  Second RN/Staff Member:  Herson GARCIA      Restraints:   Restraint Order  Length of Order: Order good for next 24 hours or when removed.  Date that the current order will : 10/27/24  Time that the current order will :   Order Upon Application: Yes    [facility]

## 2024-11-03 NOTE — ASSESSMENT & PLAN NOTE
Initial presentation c/f HHS w glucose > 600. Beta hydroxybutyrate and urine ketones negative. Serum CO2 22. Given SQ insulin at OSH.  [facility], . Hb A1c 9.9% on admit.     Resuming scheduled insulin.    Patient's FSGs are uncontrolled due to hyperglycemia on current medication regimen.  Last A1c reviewed-   Lab Results   Component Value Date    LABA1C 6.8 10/09/2017    HGBA1C 9.9 (H) 10/17/2024     Most recent fingerstick glucose reviewed-   Recent Labs   Lab 11/03/24  0024 11/03/24  0428 11/03/24  0859 11/03/24  1234   POCTGLUCOSE 206* 264* 270* 248*       Current correctional scale  Medium  Increase anti-hyperglycemic dose as follows-   Antihyperglycemics (From admission, onward)    Start     Stop Route Frequency Ordered    11/03/24 1200  insulin aspart U-100 pen 9 Units         -- SubQ 6 times per day 11/03/24 1050    11/03/24 0900  insulin glargine U-100 (Lantus) pen 25 Units         -- SubQ Daily 11/03/24 0754    10/25/24 1133  insulin aspart U-100 pen 0-10 Units         -- SubQ Every 4 hours PRN 10/25/24 1036    10/22/24 1400  insulin regular in 0.9 % NaCl 100 unit/100 mL (1 unit/mL) infusion        Question Answer Comment   Insulin Rate Adjustment (DO NOT MODIFY ANSWER) [email or web address] [id number]    Enter initial dose from Infusion Protocol Chart (Units/hr): 1.5        10/25/24 1431 IV Continuous 10/22/24 1252    10/21/24 1145  insulin regular in 0.9 % NaCl 100 unit/100 mL (1 unit/mL) infusion        Question:  Enter initial dose from Infusion Protocol Chart (Units/hr):  Answer:  1.65    10/21/24 1343 IV Continuous 10/21/24 1042        Hold Oral hypoglycemics while patient is in the hospital.

## 2024-11-03 NOTE — NURSING
Notified [facility] team that pt's urine from straight cath has a little blood clots and the color of urine is dark orange with some redness. Provider instructed to monitor the urine quality in the next straight cath. Will continue to monitor.

## 2024-11-03 NOTE — ASSESSMENT & PLAN NOTE
S/p CABG x2 in 2004  Patient has reportedly not been on Plavix recently because of his Coumadin  -Aspirin

## 2024-11-03 NOTE — ASSESSMENT & PLAN NOTE
83M PMHx of HTN, HLD, DM2, CAD s/p CABG x2, Afib on Coumadin, ILD on 4L of O2, HFrEF (45%), presenting as a transfer for a large traumatic R frontal IPH reversed with Kcentra and vitamin K with repeat INR 1.2.    Admit to NCC  q1h neuro checks, vitals, and I&O's  CBC, CMP, mag, and phos daily   Coags, TSH, A1c, lipid panel, UA  Echo, EKG, CXR  SBP <160  Na goals > 135  NSGY signed off  SCDs; heparin for VTE prophylaxis  PT/OT/SLP when extubated  S/p Trach/PEG, neuro exam unchanged at this time

## 2024-11-03 NOTE — PLAN OF CARE
"[facility] Care Plan  POC reviewed with Percy Ramirez at 1500. Pt not alert or oriented enough to verbalize understanding. No acute events today. Pt progressing toward goals. Will continue to monitor. See below and flowsheets for full assessment and VS info.     - Removed rivers.        Is this a stroke patient? yes- Stroke booklet reviewed with patient and family, risk factors identified for patient and stroke booklet remains at bedside for ongoing education.     Care individualization: Turn pt every 2 hours.    Neuro:  [place] Coma Scale  Best Eye Response: 1-->(E1) none  Best Motor Response: 3-->(M3) flexion to pain  Best Verbal Response: 1-->(V1) none  [place] Coma Scale Score: 5  Assessment Qualifiers: patient intubated  Pupil PERRLA: yes     24 hr Temp:  [97.7 °F (36.5 °C)-98 °F (36.7 °C)]     CV:   Rhythm: atrial rhythm  BP goals:   SBP < 160  MAP > 65    Resp:      Vent Mode: Spont  Set Rate: 16 BPM  Oxygen Concentration (%): 32  Vt Set: 500 mL  PEEP/CPAP: 5 cmH20  Pressure Support: 5 cmH20    Plan: trach in place    GI/:     Diet/Nutrition Received: NPO, tube feeding  Last Bowel Movement: 11/01/24  Voiding Characteristics: urethral catheter (bladder)    Intake/Output Summary (Last 24 hours) at 11/2/2024 1906  Last data filed at 11/2/2024 1905  Gross per 24 hour   Intake 1140 ml   Output 820 ml   Net 320 ml     Unmeasured Output  Urine Occurrence: 1  Stool Occurrence: 1  Pad Count: 1    Labs/Accuchecks:  Recent Labs   Lab 11/02/24  0009 11/02/24  0412   WBC 9.58  --    RBC 2.80*  --    HGB 7.7*  --    HCT 26.5* 21*     --       Recent Labs   Lab 11/02/24  0009      K 4.4   CO2 25      BUN 49*   CREATININE 1.7*   ALKPHOS 81   ALT 10   AST 19   BILITOT 0.3    No results for input(s): "PROTIME", "INR", "APTT", "HEPANTIXA" in the last 168 hours. No results for input(s): "CPK", "CPKMB", "TROPONINI", "MB" in the last 168 hours.    Electrolytes: N/A - electrolytes WDL  Accuchecks: Q4H    Gtts:   " D10W   Intravenous Continuous PRN           LDA/Wounds:    Mykel Risk Assessment  Sensory Perception: 2-->very limited  Moisture: 3-->occasionally moist  Activity: 1-->bedfast  Mobility: 1-->completely immobile  Nutrition: 3-->adequate  Friction and Shear: 2-->potential problem  Mykel Score: 12    Is your mykel score 12 or less? yes enrolled in the [facility] program, mykel mobility score is 2 or less, they are on a immerse bed, and heel boots on      Nurses Note -- 4 Eyes    Is there altered skin present?  yes ; Right crani    Please check the following boxes that apply:   [x] LDA Added if Not in Epic (Describe Wound)   [x] New Altered Skin Integrity was Present on Admit and Documented in LDA   [x] Wound Image Taken    Wound Care Consulted? No     Second RN/Staff Member:        Restraints:   Restraint Order  Length of Order: Order good for next 24 hours or when removed.  Date that the current order will : 10/27/24  Time that the current order will : 1623  Order Upon Application: Yes    VINAY

## 2024-11-04 PROBLEM — Z93.1 PEG (PERCUTANEOUS ENDOSCOPIC GASTROSTOMY) STATUS: Status: ACTIVE | Noted: 2024-11-04

## 2024-11-04 PROBLEM — R53.81 DEBILITY: Status: ACTIVE | Noted: 2024-11-04

## 2024-11-04 PROBLEM — Z93.0 TRACHEOSTOMY PRESENT: Status: ACTIVE | Noted: 2024-11-04

## 2024-11-04 LAB
ALBUMIN SERPL BCP-MCNC: 1.9 G/DL (ref 3.5–5.2)
ALLENS TEST: ABNORMAL
ALP SERPL-CCNC: 87 U/L (ref 40–150)
ALT SERPL W/O P-5'-P-CCNC: 11 U/L (ref 10–44)
ANION GAP SERPL CALC-SCNC: 10 MMOL/L (ref 8–16)
AST SERPL-CCNC: 17 U/L (ref 10–40)
BASOPHILS # BLD AUTO: 0.05 K/UL (ref 0–0.2)
BASOPHILS NFR BLD: 0.6 % (ref 0–1.9)
BILIRUB SERPL-MCNC: 0.3 MG/DL (ref 0.1–1)
BUN SERPL-MCNC: 56 MG/DL (ref 8–23)
CALCIUM SERPL-MCNC: 8.4 MG/DL (ref 8.7–10.5)
CHLORIDE SERPL-SCNC: 114 MMOL/L (ref 95–110)
CO2 SERPL-SCNC: 25 MMOL/L (ref 23–29)
CREAT SERPL-MCNC: 1.7 MG/DL (ref 0.5–1.4)
DELSYS: ABNORMAL
DIFFERENTIAL METHOD BLD: ABNORMAL
EOSINOPHIL # BLD AUTO: 0.3 K/UL (ref 0–0.5)
EOSINOPHIL NFR BLD: 3.2 % (ref 0–8)
ERYTHROCYTE [DISTWIDTH] IN BLOOD BY AUTOMATED COUNT: 17.6 % (ref 11.5–14.5)
EST. GFR  (NO RACE VARIABLE): 39.8 ML/MIN/1.73 M^2
FIO2: 40
GLUCOSE SERPL-MCNC: 106 MG/DL (ref 70–110)
HCO3 UR-SCNC: 30.1 MMOL/L (ref 24–28)
HCT VFR BLD AUTO: 27.9 % (ref 40–54)
HCT VFR BLD CALC: 27 %PCV (ref 36–54)
HGB BLD-MCNC: 8 G/DL (ref 14–18)
IMM GRANULOCYTES # BLD AUTO: 0.1 K/UL (ref 0–0.04)
IMM GRANULOCYTES NFR BLD AUTO: 1.1 % (ref 0–0.5)
LYMPHOCYTES # BLD AUTO: 0.6 K/UL (ref 1–4.8)
LYMPHOCYTES NFR BLD: 6.4 % (ref 18–48)
MAGNESIUM SERPL-MCNC: 2.8 MG/DL (ref 1.6–2.6)
MCH RBC QN AUTO: 27.4 PG (ref 27–31)
MCHC RBC AUTO-ENTMCNC: 28.7 G/DL (ref 32–36)
MCV RBC AUTO: 96 FL (ref 82–98)
MODE: ABNORMAL
MONOCYTES # BLD AUTO: 0.8 K/UL (ref 0.3–1)
MONOCYTES NFR BLD: 9.1 % (ref 4–15)
NEUTROPHILS # BLD AUTO: 6.9 K/UL (ref 1.8–7.7)
NEUTROPHILS NFR BLD: 79.6 % (ref 38–73)
NRBC BLD-RTO: 0 /100 WBC
PCO2 BLDA: 42.8 MMHG (ref 35–45)
PEEP: 5
PH SMN: 7.46 [PH] (ref 7.35–7.45)
PHOSPHATE SERPL-MCNC: 3.9 MG/DL (ref 2.7–4.5)
PLATELET # BLD AUTO: 276 K/UL (ref 150–450)
PMV BLD AUTO: 10 FL (ref 9.2–12.9)
PO2 BLDA: 79 MMHG (ref 80–100)
POC BE: 6 MMOL/L
POC SATURATED O2: 96 % (ref 95–100)
POC TCO2: 31 MMOL/L (ref 23–27)
POCT GLUCOSE: 102 MG/DL (ref 70–110)
POCT GLUCOSE: 128 MG/DL (ref 70–110)
POCT GLUCOSE: 148 MG/DL (ref 70–110)
POCT GLUCOSE: 187 MG/DL (ref 70–110)
POCT GLUCOSE: 220 MG/DL (ref 70–110)
POCT GLUCOSE: 225 MG/DL (ref 70–110)
POTASSIUM SERPL-SCNC: 4.4 MMOL/L (ref 3.5–5.1)
PROT SERPL-MCNC: 5.9 G/DL (ref 6–8.4)
RBC # BLD AUTO: 2.92 M/UL (ref 4.6–6.2)
SAMPLE: ABNORMAL
SITE: ABNORMAL
SODIUM SERPL-SCNC: 149 MMOL/L (ref 136–145)
WBC # BLD AUTO: 8.71 K/UL (ref 3.9–12.7)

## 2024-11-04 PROCEDURE — 99233 SBSQ HOSP IP/OBS HIGH 50: CPT | Mod: GC,,, | Performed by: PSYCHIATRY & NEUROLOGY

## 2024-11-04 PROCEDURE — 25000003 PHARM REV CODE 250

## 2024-11-04 PROCEDURE — 63600175 PHARM REV CODE 636 W HCPCS

## 2024-11-04 PROCEDURE — 94640 AIRWAY INHALATION TREATMENT: CPT

## 2024-11-04 PROCEDURE — 99900035 HC TECH TIME PER 15 MIN (STAT)

## 2024-11-04 PROCEDURE — 94761 N-INVAS EAR/PLS OXIMETRY MLT: CPT

## 2024-11-04 PROCEDURE — 94003 VENT MGMT INPAT SUBQ DAY: CPT

## 2024-11-04 PROCEDURE — 20000000 HC ICU ROOM

## 2024-11-04 PROCEDURE — 51798 US URINE CAPACITY MEASURE: CPT

## 2024-11-04 PROCEDURE — 27200966 HC CLOSED SUCTION SYSTEM

## 2024-11-04 PROCEDURE — 94668 MNPJ CHEST WALL SBSQ: CPT

## 2024-11-04 PROCEDURE — 25000003 PHARM REV CODE 250: Performed by: INTERNAL MEDICINE

## 2024-11-04 PROCEDURE — 80053 COMPREHEN METABOLIC PANEL: CPT

## 2024-11-04 PROCEDURE — 99900026 HC AIRWAY MAINTENANCE (STAT)

## 2024-11-04 PROCEDURE — 27100171 HC OXYGEN HIGH FLOW UP TO 24 HOURS

## 2024-11-04 PROCEDURE — 85025 COMPLETE CBC W/AUTO DIFF WBC: CPT

## 2024-11-04 PROCEDURE — 84100 ASSAY OF PHOSPHORUS: CPT

## 2024-11-04 PROCEDURE — 83735 ASSAY OF MAGNESIUM: CPT

## 2024-11-04 PROCEDURE — 25000242 PHARM REV CODE 250 ALT 637 W/ HCPCS

## 2024-11-04 PROCEDURE — 51701 INSERT BLADDER CATHETER: CPT

## 2024-11-04 PROCEDURE — 36600 WITHDRAWAL OF ARTERIAL BLOOD: CPT

## 2024-11-04 PROCEDURE — 82803 BLOOD GASES ANY COMBINATION: CPT

## 2024-11-04 RX ORDER — INSULIN ASPART 100 [IU]/ML
6 INJECTION, SOLUTION INTRAVENOUS; SUBCUTANEOUS
Status: CANCELLED | OUTPATIENT
Start: 2024-11-04

## 2024-11-04 RX ADMIN — LEVETIRACETAM 750 MG: 500 SOLUTION ORAL at 08:11

## 2024-11-04 RX ADMIN — SODIUM CHLORIDE SOLN NEBU 3% 4 ML: 3 NEBU SOLN at 08:11

## 2024-11-04 RX ADMIN — LACOSAMIDE 200 MG: 50 TABLET, FILM COATED ORAL at 08:11

## 2024-11-04 RX ADMIN — HEPARIN SODIUM 5000 UNITS: 5000 INJECTION INTRAVENOUS; SUBCUTANEOUS at 01:11

## 2024-11-04 RX ADMIN — INSULIN GLARGINE 25 UNITS: 100 INJECTION, SOLUTION SUBCUTANEOUS at 08:11

## 2024-11-04 RX ADMIN — INSULIN ASPART 4 UNITS: 100 INJECTION, SOLUTION INTRAVENOUS; SUBCUTANEOUS at 08:11

## 2024-11-04 RX ADMIN — SILODOSIN 8 MG: 8 CAPSULE ORAL at 08:11

## 2024-11-04 RX ADMIN — INSULIN ASPART 9 UNITS: 100 INJECTION, SOLUTION INTRAVENOUS; SUBCUTANEOUS at 11:11

## 2024-11-04 RX ADMIN — ATORVASTATIN CALCIUM 40 MG: 40 TABLET, FILM COATED ORAL at 08:11

## 2024-11-04 RX ADMIN — ASPIRIN 81 MG CHEWABLE TABLET 81 MG: 81 TABLET CHEWABLE at 08:11

## 2024-11-04 RX ADMIN — INSULIN ASPART 9 UNITS: 100 INJECTION, SOLUTION INTRAVENOUS; SUBCUTANEOUS at 12:11

## 2024-11-04 RX ADMIN — SENNOSIDES AND DOCUSATE SODIUM 2 TABLET: 50; 8.6 TABLET ORAL at 08:11

## 2024-11-04 RX ADMIN — INSULIN ASPART 4 UNITS: 100 INJECTION, SOLUTION INTRAVENOUS; SUBCUTANEOUS at 11:11

## 2024-11-04 RX ADMIN — INSULIN ASPART 9 UNITS: 100 INJECTION, SOLUTION INTRAVENOUS; SUBCUTANEOUS at 08:11

## 2024-11-04 RX ADMIN — SODIUM CHLORIDE SOLN NEBU 3% 4 ML: 3 NEBU SOLN at 12:11

## 2024-11-04 RX ADMIN — INSULIN ASPART 9 UNITS: 100 INJECTION, SOLUTION INTRAVENOUS; SUBCUTANEOUS at 03:11

## 2024-11-04 RX ADMIN — HEPARIN SODIUM 5000 UNITS: 5000 INJECTION INTRAVENOUS; SUBCUTANEOUS at 10:11

## 2024-11-04 RX ADMIN — FAMOTIDINE 20 MG: 20 TABLET ORAL at 08:11

## 2024-11-04 RX ADMIN — INSULIN ASPART 2 UNITS: 100 INJECTION, SOLUTION INTRAVENOUS; SUBCUTANEOUS at 03:11

## 2024-11-04 RX ADMIN — DULOXETINE HYDROCHLORIDE 60 MG: 60 CAPSULE, DELAYED RELEASE ORAL at 08:11

## 2024-11-04 RX ADMIN — CARVEDILOL 3.12 MG: 3.12 TABLET, FILM COATED ORAL at 08:11

## 2024-11-04 RX ADMIN — HEPARIN SODIUM 5000 UNITS: 5000 INJECTION INTRAVENOUS; SUBCUTANEOUS at 05:11

## 2024-11-04 NOTE — ASSESSMENT & PLAN NOTE
83M PMHx of HTN, HLD, DM2, CAD s/p CABG x2, Afib on Coumadin, ILD on 4L of O2, HFrEF (45%), presenting as a transfer for a large traumatic R frontal IPH reversed with Kcentra and vitamin K with repeat INR 1.2.    Admit to NCC  q1h neuro checks, vitals, and I&O's  CBC, CMP, mag, and phos daily   Coags, TSH, A1c, lipid panel, UA  Echo, EKG, CXR  SBP <160  Na goals > 135  NSGY signed off  SCDs; heparin for VTE prophylaxis  PT/OT/SLP when extubated  S/p Trach/PEG, neuro exam unchanged at this time  LTAC placement pending, SW on board.

## 2024-11-04 NOTE — PROGRESS NOTES
[facility] - Neuro Critical Care  Neurocritical Care  Progress Note    Admit Date: 10/17/2024  Service Date: 11/04/2024  Length of Stay: 18    Subjective:     Chief Complaint: Traumatic right-sided intracerebral hemorrhage without loss of consciousness    History of Present Illness: Percy Ramirez is a 83M PMHx of HTN, HLD, DM2, CAD s/p CABG x2, Afib on Coumadin, ILD on 4L of O2, HFrEF (45%), presenting as a transfer for a large traumatic R frontal IPH. Pt initially presented to [facility] ED yesterday ago after mechanical fall and was found to have 4mm parafalcine SDH. Repeat CTH was stable. Pt was discharged home on keppra and instructed to hold his blood thinners. On 10/17/24 pt was lethargic and fell. He was brought back to  ED. Blood glucose was >600 and he was febrile (101F). CTH revealed 5cm R frontal IPH with 7mm MLS. Kcentra, 10mg vitamin K, and 3% HTS bolus were given. He was transferred to [facility] for higher level of care.     Hospital Course: 10/19/2024: POD #1 from both his MI LS as well as his right subdural/intraparenchymal hemorrhage evacuation.  He is still intubated and although sedation is off, he is minimally responsive. Coreg doubled. NG tube placed and tube feeds started. Still on insulin drip.  10/20/2024: POD #2 s/p crani for SDH evacuation. Leukocytosis noted post-op, no accompanying fever/chills. Straight cath x1. Tachycardia responding to IVF boluses. Tube feeds started, will titrate to goal. Insulin gtt continued, plans to transition to subQ tomorrow.  10/21/2021: POD#3. EEG placed this morning, revealing multiple r-sided seizures. Keppra load completed and maintenance dose increased. Concern for LUE no longer WD, stat head CT showing new/increased hyperdensity in resection bed w/o worsening mass effect or MLS. 1L LR given for LEANDRO. Will continue to monitor Na.   10/22/24: POD#4. Addl seizures despite keppra load. Vimpat and propofol started yesterday with significant reduction in number.  1U PRBC transfused overnight. Required levo for short period of time after initiation of propofol.  10/23/24: POD#5.  Patient continues to be hyponatremic.  Free water volume increased.  Patient with total of 11 seizures yesterday.  Vimpat and propofol dosing increased with improvement.  10/24/24: POD#6. NGT adjusted overnight. No additional seizures on current AEM regimen. Platelets and Hgb decreasing, will order peripheral smear to assess for HIT  10/25/2024 POD#7. Addl seizure, Onfi added to AEM regimen. Drains removed by NSGY. Will attempt to wean propofol tomorrow.   10/26/2024 POD#8. MRI complete yx. Weaning propofol today.   10/27/2024 POD#9. Patient began having seizures again overnight following cessation of propofol gtt. Resumed propofol at 10cc/hr for now, increased Keppra dose, and plan to attempt to wean again tomorrow. Persistent hyperglycemia on tube feeds, optimizing insulin regimen. Leukocytosis pending respiratory cultures.  10/28/2024 POD10. No addl seizures overnight. Propofol turned off this morning. Clobazam dosing decreased as well. Resp cultures negative, but will obtain blood and urine cultures. Cont abx. NSGY signing off.   10/29/2024 POD11. No seizures. Clobazam down to 10mg QHS. Infiltrate developing RLL, sputum culture likely due to colonization of resp tract per ID.   10/30/2024 POD12. No seizures. Clobazam discontinued. Abx discontinued, as no significant sputum production and resp status is stable. Discussion with family today - trach/PEG likely tomorrow.   10/31/2024 POD13. Single apneic episode yx evening with no EEG correlation. Spontaneously resolved. To OR today for trach/PEG. EEG cap to be removed.   11/1/24 POD14. Hyperkalemia overnight, resolving with lokelma and albuterol 10mg x1.   11/2/24 POD15. NAEON. Remains stable.  11/3/24 POD16. NAEON. Stable. Increased silodosin and optimized bowel regimen. Trach collar trials daily.  11/4/24 POD17: NAEON. Stable, spontaneously  voiding. Free water flushes increased to 400cc QID. Q4 neuro checks. Tolerating trach collar well during the day.     Interval History:  Pt stable and tolerating trach collar well during the day. Pending LTAC placement.     Review of Systems   Unable to perform ROS: Intubated     .  Objective:     Vitals:  Temp: 98.5 °F (36.9 °C)  Pulse: 79  BP: (!) 124/58  MAP (mmHg): 84  Resp: 12  SpO2: 98 %  Oxygen Concentration (%): 60  Vent Mode: Spont  Pressure Support: 5 cmH20  PEEP/CPAP: 5 cmH20  Peak Airway Pressure: 10 cmH20  Mean Airway Pressure: 6 cmH20  Plateau Pressure: 13 cmH20    Temp  Min: 97.7 °F (36.5 °C)  Max: 99.2 °F (37.3 °C)  Pulse  Min: 65  Max: 96  BP  Min: 90/52  Max: 161/68  MAP (mmHg)  Min: 65  Max: 107  Resp  Min: 11  Max: 30  SpO2  Min: 92 %  Max: 100 %  Oxygen Concentration (%)  Min: 40  Max: 60    11/03 0701 - 11/04 0700  In: 2510 [I.V.:50]  Out: 1180 [Urine:1180]   Unmeasured Output  Urine Occurrence: 1  Stool Occurrence: 0  Pad Count: 1        Physical Exam  Vitals and nursing note reviewed.   Constitutional:       General: He is not in acute distress.     Appearance: He is not toxic-appearing.   Eyes:      Conjunctiva/sclera: Conjunctivae normal.      Pupils: Pupils are equal, round, and reactive to light.   Cardiovascular:      Comments: Irregularly irregular  Pulmonary:      Effort: No respiratory distress.      Comments: Trach collar, breathing spontaneously  Abdominal:      General: There is no distension.      Palpations: Abdomen is soft.      Tenderness: There is no abdominal tenderness.      Comments: PEG in place   Musculoskeletal:         General: Swelling (UE) present.   Skin:     General: Skin is dry.   Neurological:      Comments: Unarousable  PERRL  +Gag  BLE flex to pain     .       Medications:  [id number]    Scheduledaspirin, 81 mg, Daily  atorvastatin, 40 mg, Daily  carvediloL, 3.125 mg, BID  DULoxetine, 60 mg, Daily  famotidine, 20 mg, Daily  heparin (porcine), 5,000 Units,  Q8H  insulin aspart U-100, 9 Units, 6 times per day  insulin glargine U-100, 25 Units, Daily  lacosamide, 200 mg, Q12H  levetiracetam, 750 mg, BID  senna-docusate 8.6-50 mg, 2 tablet, BID  silodosin, 8 mg, Daily  sodium chloride 3%, 4 mL, Q6H    PRNacetaminophen, 650 mg, Q6H PRN  D10W, , Continuous PRN  dextrose 10%, 12.5 g, PRN  dextrose 10%, 25 g, PRN  glucagon (human recombinant), 1 mg, PRN  hydrALAZINE, 10 mg, Q4H PRN  insulin aspart U-100, 0-10 Units, Q4H PRN  labetalol, 10 mg, Q4H PRN  ondansetron, 4 mg, Q8H PRN      Today I personally reviewed pertinent medications, lines/drains/airways, imaging, laboratory results,    Diet  Diet NPO Except for: Medication  Diet NPO Except for: Medication        Assessment/Plan:     Neuro  * Traumatic right-sided intracerebral hemorrhage without loss of consciousness  83M PMHx of HTN, HLD, DM2, CAD s/p CABG x2, Afib on Coumadin, ILD on 4L of O2, HFrEF (45%), presenting as a transfer for a large traumatic R frontal IPH reversed with Kcentra and vitamin K with repeat INR 1.2.    Admit to NCC  q1h neuro checks, vitals, and I&O's  CBC, CMP, mag, and phos daily   Coags, TSH, A1c, lipid panel, UA  Echo, EKG, CXR  SBP <160  Na goals > 135  NSGY signed off  SCDs; heparin for VTE prophylaxis  PT/OT/SLP when extubated  S/p Trach/PEG, neuro exam unchanged at this time  LTAC placement pending, SW on board.    Psychiatric  Major depressive disorder, recurrent episode, mild  Continue home duloxetine.    Pulmonary  ILD (interstitial lung disease)  ILD on 4L of O2 at home    SpO2 goal > 88%  Duo nebs and tiotropium  Currently on trach collar during day  Continue chest physiotherapy and HTN nebs to help thin secretions    Cardiac/Vascular  Hyperlipidemia LDL goal <100  Lipid panel showed low LDL, low HDL, low total cholesterol  Continue Atorvastatin    Coronary artery disease  S/p CABG x2 in 2004  Patient has reportedly not been on Plavix recently because of his Coumadin  -Continue home  Aspirin and atorvastatin      Benign hypertension with chronic kidney disease, stage III  SBP goal < 160  PRN labetalol and hydralazine   Continue Home meds as needed for HTN      Renal/  LEANDRO (acute kidney injury)  LEANDRO on CKD, Cr 1.8 on initial presentation to ED    Recent Labs     11/02/24  0009 11/03/24  0121 11/04/24  0053   CREATININE 1.7* 1.7* 1.7*   EGFRNORACEVR 39.8* 39.8* 39.8*        Plan  - LEANDRO is resolved, increased to 1.7  - Avoid nephrotoxins and renally dose meds for GFR listed above  - Monitor urine output, serial BMP, and adjust therapy as needed  - Q1H I&Os  - Free water boluses -- 400cc QID  - Pt has required straight catheterization during ICU stay however is currently voiding spontaneously; no need for rivers catheter placement at this time.     Chronic kidney disease, stage 3a  Creatine stable for now. BMP reviewed- noted Estimated Creatinine Clearance: 35.3 mL/min (A) (based on SCr of 1.7 mg/dL (H)). according to latest data. Based on current GFR, CKD stage is stage 3 - GFR 30-59.  Monitor UOP and serial BMP and adjust therapy as needed. Renally dose meds. Avoid nephrotoxic medications and procedures.    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 Beta hydroxybutyrate and urine ketones negative. Serum CO2 22. Given SQ insulin at OSH.  On arrival to [facility], . Hb A1c 9.9% on admit.     Resuming scheduled insulin.    Patient's FSGs are uncontrolled due to hyperglycemia on current medication regimen.  Last A1c reviewed-   Lab Results   Component Value Date    LABA1C 6.8 10/09/2017    HGBA1C 9.9 (H) 10/17/2024     Most recent fingerstick glucose reviewed-   Recent Labs   Lab 11/04/24  0006 11/04/24  0347 11/04/24  0836 11/04/24  1149   POCTGLUCOSE 128* 102 225* 220*       Current correctional scale  Medium  Increase anti-hyperglycemic dose as follows-   Antihyperglycemics (From admission, onward)      Start     Stop Route  Frequency Ordered    11/03/24 1200  insulin aspart U-100 pen 9 Units         -- SubQ 6 times per day 11/03/24 1050    11/03/24 0900  insulin glargine U-100 (Lantus) pen 25 Units         -- SubQ Daily 11/03/24 0754    10/25/24 1133  insulin aspart U-100 pen 0-10 Units         -- SubQ Every 4 hours PRN 10/25/24 1036    10/22/24 1400  insulin regular in 0.9 % NaCl 100 unit/100 mL (1 unit/mL) infusion        Question Answer Comment   Insulin Rate Adjustment (DO NOT MODIFY ANSWER) [email or web address]    Enter initial dose from Infusion Protocol Chart (Units/hr): 1.5        10/25/24 1431 IV Continuous 10/22/24 1252    10/21/24 1145  insulin regular in 0.9 % NaCl 100 unit/100 mL (1 unit/mL) infusion        Question:  Enter initial dose from Infusion Protocol Chart (Units/hr):  Answer:  1.65    10/21/24 1343 IV Continuous 10/21/24 1042          Hold Oral hypoglycemics while patient is in the hospital.      Other  Obstructive sleep apnea treated with BiPAP  Currently with trach on vent but tolerating trach collar and breathing spontaneously during the day.  Daily ABG  No significant abnormalities on today's ABG            The patient is being Prophylaxed for:  Venous Thromboembolism with: Mechanical  Stress Ulcer with: H2B  Ventilator Pneumonia with: chlorhexidine oral care    Activity Orders            Elevate HOB starting at 10/31 1445    Diet NPO Except for: Medication: NPO starting at 10/31 0001    Turn patient starting at 10/18 0000          Full Code    Maricruz Vasquez DO  Neurocritical Care  [facility] - Neuro Critical Care

## 2024-11-04 NOTE — PLAN OF CARE
"[facility] Care Plan    POC reviewed with Percy Ramirez and family at 0300. Patient and Family verbalized understanding. Questions and concerns addressed. No acute events today. Pt progressing toward goals. Will continue to monitor. See below and flowsheets for full assessment and VS info.             Is this a stroke patient? yes- Stroke booklet reviewed with patient and family, risk factors identified for patient and stroke booklet remains at bedside for ongoing education.     Care individualization: Stroke education    Neuro:  Federico Coma Scale  Best Eye Response: 2-->(E2) to pain  Best Motor Response: 3-->(M3) flexion to pain  Best Verbal Response: 1-->(V1) none  Federico Coma Scale Score: 6  Assessment Qualifiers: other (see comments) (pt has trach on vent)  Pupil PERRLA: yes     24 hr Temp:  [97.7 °F (36.5 °C)-99.1 °F (37.3 °C)]     CV:   Rhythm: atrial rhythm  BP goals:   SBP < 160  MAP > 65    Resp:      Vent Mode: Spont  Set Rate: 16 BPM  Oxygen Concentration (%): 40  Vt Set: 500 mL  PEEP/CPAP: 5 cmH20  Pressure Support: 5 cmH20    Plan: trach in place    GI/:     Diet/Nutrition Received: tube feeding  Last Bowel Movement: 11/01/24  Voiding Characteristics: external catheter    Intake/Output Summary (Last 24 hours) at 11/4/2024 0354  Last data filed at 11/4/2024 0301  Gross per 24 hour   Intake 2410 ml   Output 1480 ml   Net 930 ml     Unmeasured Output  Urine Occurrence: 1  Stool Occurrence: 0  Pad Count: 1    Labs/Accuchecks:  Recent Labs   Lab 11/04/24  0053   WBC 8.71   RBC 2.92*   HGB 8.0*   HCT 27.9*         Recent Labs   Lab 11/04/24  0053   *   K 4.4   CO2 25   *   BUN 56*   CREATININE 1.7*   ALKPHOS 87   ALT 11   AST 17   BILITOT 0.3    No results for input(s): "PROTIME", "INR", "APTT", "HEPANTIXA" in the last 168 hours. No results for input(s): "CPK", "CPKMB", "TROPONINI", "MB" in the last 168 hours.    Electrolytes: N/A - electrolytes WDL  Accuchecks: " Q4H    Gtts:      LDA/Wounds:    Mykel Risk Assessment  Sensory Perception: 2-->very limited  Moisture: 3-->occasionally moist  Activity: 1-->bedfast  Mobility: 2-->very limited  Nutrition: 3-->adequate  Friction and Shear: 2-->potential problem  Mykel Score: 13  Is your mykel score 12 or less? no    Nurses Note -- 4 Eyes    Is there altered skin present?  yes     Please check the following boxes that apply:   [] LDA Added if Not in Epic (Describe Wound)   [] New Altered Skin Integrity was Present on Admit and Documented in LDA   [] Wound Image Taken    Wound Care Consulted? No, surgical incision     Second RN/Staff Member:  RADHA Bright

## 2024-11-04 NOTE — CARE UPDATE
Flow increased to 12 liters and O2 increased to 60% due to sats decreasing to 88%. No distress noted, will continue to monitor.

## 2024-11-04 NOTE — ASSESSMENT & PLAN NOTE
S/p CABG x2 in 2004  Patient has reportedly not been on Plavix recently because of his Coumadin  -Continue home Aspirin and atorvastatin

## 2024-11-04 NOTE — ASSESSMENT & PLAN NOTE
Initial presentation c/f HHS w glucose > 600. Beta hydroxybutyrate and urine ketones negative. Serum CO2 22. Given SQ insulin at OSH.  On arrival to [facility], . Hb A1c 9.9% on admit.     Resuming scheduled insulin.    Patient's FSGs are uncontrolled due to hyperglycemia on current medication regimen.  Last A1c reviewed-   Lab Results   Component Value Date    LABA1C 6.8 10/09/2017    HGBA1C 9.9 (H) 10/17/2024     Most recent fingerstick glucose reviewed-   Recent Labs   Lab 11/04/24  0006 11/04/24  0347 11/04/24  0836 11/04/24  1149   POCTGLUCOSE 128* 102 225* 220*       Current correctional scale  Medium  Increase anti-hyperglycemic dose as follows-   Antihyperglycemics (From admission, onward)    Start     Stop Route Frequency Ordered    11/03/24 1200  insulin aspart U-100 pen 9 Units         -- SubQ 6 times per day 11/03/24 1050    11/03/24 0900  insulin glargine U-100 (Lantus) pen 25 Units         -- SubQ Daily 11/03/24 0754    10/25/24 1133  insulin aspart U-100 pen 0-10 Units         -- SubQ Every 4 hours PRN 10/25/24 1036    10/22/24 1400  insulin regular in 0.9 % NaCl 100 unit/100 mL (1 unit/mL) infusion        Question Answer Comment   Insulin Rate Adjustment (DO NOT MODIFY ANSWER) [email or web address] [email or web address]    Enter initial dose from Infusion Protocol Chart (Units/hr): 1.5        10/25/24 1431 IV Continuous 10/22/24 1252    10/21/24 1145  insulin regular in 0.9 % NaCl 100 unit/100 mL (1 unit/mL) infusion        Question:  Enter initial dose from Infusion Protocol Chart (Units/hr):  Answer:  1.65    10/21/24 1343 IV Continuous 10/21/24 1042        Hold Oral hypoglycemics while patient is in the hospital.

## 2024-11-04 NOTE — ASSESSMENT & PLAN NOTE
Currently with trach on vent but tolerating trach collar and breathing spontaneously during the day.  Daily ABG  No significant abnormalities on today's ABG

## 2024-11-04 NOTE — PLAN OF CARE
"[facility] Care Plan  POC reviewed with Percy Ramirez and family at 1500. Pt not alert or oriented enough, but family verbalized understanding. Questions and concerns addressed. No acute events today. Pt progressing toward goals. Will continue to monitor. See below and flowsheets for full assessment and VS info.     - Pt is tolerating trach collar usage.         Is this a stroke patient? yes- Stroke booklet reviewed with patient and family, risk factors identified for patient and stroke booklet remains at bedside for ongoing education.     Care individualization: Turn pt every 2 hours.    Neuro:  Federico Coma Scale  Best Eye Response: 2-->(E2) to pain  Best Motor Response: 3-->(M3) flexion to pain  Best Verbal Response: 1-->(V1) none  Federico Coma Scale Score: 6  Assessment Qualifiers: other (see comments) (pt has trach on vent)  Pupil PERRLA: yes     24 hr Temp:  [97.7 °F (36.5 °C)-98.7 °F (37.1 °C)]     CV:   Rhythm: atrial rhythm  BP goals:   SBP < 160  MAP > 65    Resp:      Vent Mode: Spont  Set Rate: 16 BPM  Oxygen Concentration (%): 40  Vt Set: 500 mL  PEEP/CPAP: 5 cmH20  Pressure Support: 5 cmH20    Plan:  Pt will be able to stay on trach collar without going back to mechanical ventilation.    GI/:     Diet/Nutrition Received: NPO, tube feeding  Last Bowel Movement: 11/01/24  Voiding Characteristics: external catheter    Intake/Output Summary (Last 24 hours) at 11/3/2024 1825  Last data filed at 11/3/2024 1801  Gross per 24 hour   Intake 2060 ml   Output 1380 ml   Net 680 ml     Unmeasured Output  Urine Occurrence: 1  Stool Occurrence: 0  Pad Count: 1    Labs/Accuchecks:  Recent Labs   Lab 11/03/24  0121   WBC 8.33   RBC 2.90*   HGB 7.9*   HCT 27.6*         Recent Labs   Lab 11/03/24  0121   *   K 4.5   CO2 23   *   BUN 55*   CREATININE 1.7*   ALKPHOS 96   ALT 12   AST 19   BILITOT 0.3    No results for input(s): "PROTIME", "INR", "APTT", "HEPANTIXA" in the last 168 hours. No results for " "input(s): "CPK", "CPKMB", "TROPONINI", "MB" in the last 168 hours.    Electrolytes: N/A - electrolytes WDL  Accuchecks: Q4H    Gtts:   D10W   Intravenous Continuous PRN           LDA/Wounds:    Mykel Risk Assessment  Sensory Perception: 2-->very limited  Moisture: 4-->rarely moist  Activity: 1-->bedfast  Mobility: 1-->completely immobile  Nutrition: 3-->adequate  Friction and Shear: 2-->potential problem  Mykel Score: 13    Is your mykel score 12 or less? no      Nurses Note -- 4 Eyes    Is there altered skin present?  yes ; Right crani    Please check the following boxes that apply:   [x] LDA Added if Not in Epic (Describe Wound)   [x] New Altered Skin Integrity was Present on Admit and Documented in LDA   [x] Wound Image Taken    Wound Care Consulted? No     Second RN/Staff Member:        Restraints:   Restraint Order  Length of Order: Order good for next 24 hours or when removed.  Date that the current order will : 10/27/24  Time that the current order will : 1623  Order Upon Application: Yes    [facility]   "

## 2024-11-04 NOTE — PLAN OF CARE
[facility] - Neuro Critical Care  Discharge Reassessment    Primary Care Provider: Kasie Edmondson MD    Expected Discharge Date: 11/5/2024    Reassessment (most recent)       Discharge Reassessment - 11/04/24 1029          Discharge Reassessment    Assessment Type Discharge Planning Reassessment     Did the patient's condition or plan change since previous assessment? No     Communicated TATI with patient/caregiver No     Discharge Plan A --   tbd    Discharge Plan B --   tbd    DME Needed Upon Discharge  none (P)      Transition of Care Barriers None (P)      Why the patient remains in the hospital Requires continued medical care (P)         Post-Acute Status    Discharge Delays None known at this time (P)                  Pt is not medically ready to discharge.  SW will continue to monitor pt needs.         Discharge Plan A and Plan B have been determined by review of patient's clinical status, future medical and therapeutic needs, and coverage/benefits for post-acute care in coordination with multidisciplinary team members.    Haleigh Perdomo MSW, LCSW  [facility]  Case Management Dept.

## 2024-11-04 NOTE — ASSESSMENT & PLAN NOTE
ILD on 4L of O2 at home    SpO2 goal > 88%  Duo nebs and tiotropium  Currently on trach collar during day  Continue chest physiotherapy and HTN nebs to help thin secretions

## 2024-11-04 NOTE — PLAN OF CARE
"[facility] Care Plan  POC reviewed with Percy Ramirez and family at 1400. Family verbalized understanding. Questions and concerns addressed. No acute events today. Pt progressing toward goals. Will continue to monitor. See below and flowsheets for full assessment and VS info.             Is this a stroke patient? no    Neuro:  Federico Coma Scale  Best Eye Response: 1-->(E1) none  Best Motor Response: 2-->(M2) extension to pain  Best Verbal Response: 1-->(V1) none  Federico Coma Scale Score: 4  Assessment Qualifiers: no eye obstruction present  Pupil PERRLA: yes     24 hr Temp:  [97.7 °F (36.5 °C)-99.2 °F (37.3 °C)]     CV:   Rhythm: atrial rhythm  BP goals:   SBP < 160  MAP > 65    Resp:      Vent Mode: Spont  Set Rate: 16 BPM  Oxygen Concentration (%): 40  Vt Set: 500 mL  PEEP/CPAP: 5 cmH20  Pressure Support: 5 cmH20    Plan: trach in place    GI/:     Diet/Nutrition Received: tube feeding  Last Bowel Movement: 11/01/24  Voiding Characteristics: external catheter    Intake/Output Summary (Last 24 hours) at 11/4/2024 1518  Last data filed at 11/4/2024 1505  Gross per 24 hour   Intake 2570 ml   Output 750 ml   Net 1820 ml     Unmeasured Output  Urine Occurrence: 1  Stool Occurrence: 0  Pad Count: 1    Labs/Accuchecks:  Recent Labs   Lab 11/04/24  0053 11/04/24  0522   WBC 8.71  --    RBC 2.92*  --    HGB 8.0*  --    HCT 27.9* 27*     --       Recent Labs   Lab 11/04/24  0053   *   K 4.4   CO2 25   *   BUN 56*   CREATININE 1.7*   ALKPHOS 87   ALT 11   AST 17   BILITOT 0.3    No results for input(s): "PROTIME", "INR", "APTT", "HEPANTIXA" in the last 168 hours. No results for input(s): "CPK", "CPKMB", "TROPONINI", "MB" in the last 168 hours.    Electrolytes: N/A - electrolytes WDL  Accuchecks: Q4H    Gtts:   D10W   Intravenous Continuous PRN           LDA/Wounds:    Mykel Risk Assessment  Sensory Perception: 1-->completely limited  Moisture: 3-->occasionally moist  Activity: 1-->bedfast  Mobility: " 1-->completely immobile  Nutrition: 3-->adequate  Friction and Shear: 2-->potential problem  Mykel Score: 11    Is your mykel score 12 or less? yes enrolled in the peach program      Nurses Note -- 4 Eyes    Is there altered skin present?  No  Second RN/Staff Member:  RADHA Obregon      Restraints:   Restraint Order  Length of Order: Order good for next 24 hours or when removed.  Date that the current order will : 10/27/24  Time that the current order will : 1623  Order Upon Application: Yes    [facility]

## 2024-11-04 NOTE — PLAN OF CARE
11/04/24 1522   Post-Acute Status   Post-Acute Authorization Placement   Post-Acute Placement Status Referrals Sent   Coverage Payor: [employer] [facility] [facility] CHOICES -   Patient choice form signed by patient/caregiver List with quality metrics by geographic area provided   Discharge Delays None known at this time   Discharge Plan   Discharge Plan A Long-term acute care facility (LTAC)   Discharge Plan B Home with family     Met with pt daughter Kalyani  to review discharge recommendation of LTAC and is agreeable to plan    Patient/family provided list of facilities in-network with patient's payor plan. Providers that are owned, operated, or affiliated with [facility] are included on the list.     Notified that referral sent to below listed facilities from in-network list based on proximity to home/family support:   [facility]   2. [facility]  3.  4.  5. (can send more than 5)    Patient/family instructed to identify preference.    Preferred Facility: (if more than 1, listed in order of descending preference)  [facility]     If an additional preferred facility not listed above is identified, additional referral to be sent. If above facilities unable to accept, will send additional referrals to in-network providers.     Discharge Plan A and Plan B have been determined by review of patient's clinical status, future medical and therapeutic needs, and coverage/benefits for post-acute care in coordination with multidisciplinary team members.    Haleigh Perdomo MSW, FIONAW  [facility]  Case Management Dept.

## 2024-11-04 NOTE — SUBJECTIVE & OBJECTIVE
Interval History:  Pt stable and tolerating trach collar well during the day. Pending LTAC placement.     Review of Systems   Unable to perform ROS: Intubated     .  Objective:     Vitals:  Temp: 98.5 °F (36.9 °C)  Pulse: 79  BP: (!) 124/58  MAP (mmHg): 84  Resp: 12  SpO2: 98 %  Oxygen Concentration (%): 60  Vent Mode: Spont  Pressure Support: 5 cmH20  PEEP/CPAP: 5 cmH20  Peak Airway Pressure: 10 cmH20  Mean Airway Pressure: 6 cmH20  Plateau Pressure: 13 cmH20    Temp  Min: 97.7 °F (36.5 °C)  Max: 99.2 °F (37.3 °C)  Pulse  Min: 65  Max: 96  BP  Min: 90/52  Max: 161/68  MAP (mmHg)  Min: 65  Max: 107  Resp  Min: 11  Max: 30  SpO2  Min: 92 %  Max: 100 %  Oxygen Concentration (%)  Min: 40  Max: 60    11/03 0701 - 11/04 0700  In: 2510 [I.V.:50]  Out: 1180 [Urine:1180]   Unmeasured Output  Urine Occurrence: 1  Stool Occurrence: 0  Pad Count: 1        Physical Exam  Vitals and nursing note reviewed.   Constitutional:       General: He is not in acute distress.     Appearance: He is not toxic-appearing.   Eyes:      Conjunctiva/sclera: Conjunctivae normal.      Pupils: Pupils are equal, round, and reactive to light.   Cardiovascular:      Comments: Irregularly irregular  Pulmonary:      Effort: No respiratory distress.      Comments: Trach collar, breathing spontaneously  Abdominal:      General: There is no distension.      Palpations: Abdomen is soft.      Tenderness: There is no abdominal tenderness.      Comments: PEG in place   Musculoskeletal:         General: Swelling (UE) present.   Skin:     General: Skin is dry.   Neurological:      Comments: Unarousable  PERRL  +Gag  BLE flex to pain     .       Medications:  [id number]    Scheduledaspirin, 81 mg, Daily  atorvastatin, 40 mg, Daily  carvediloL, 3.125 mg, BID  DULoxetine, 60 mg, Daily  famotidine, 20 mg, Daily  heparin (porcine), 5,000 Units, Q8H  insulin aspart U-100, 9 Units, 6 times per day  insulin glargine U-100, 25 Units, Daily  lacosamide, 200 mg,  Q12H  levetiracetam, 750 mg, BID  senna-docusate 8.6-50 mg, 2 tablet, BID  silodosin, 8 mg, Daily  sodium chloride 3%, 4 mL, Q6H    PRNacetaminophen, 650 mg, Q6H PRN  D10W, , Continuous PRN  dextrose 10%, 12.5 g, PRN  dextrose 10%, 25 g, PRN  glucagon (human recombinant), 1 mg, PRN  hydrALAZINE, 10 mg, Q4H PRN  insulin aspart U-100, 0-10 Units, Q4H PRN  labetalol, 10 mg, Q4H PRN  ondansetron, 4 mg, Q8H PRN      Today I personally reviewed pertinent medications, lines/drains/airways, imaging, laboratory results,    Diet  Diet NPO Except for: Medication  Diet NPO Except for: Medication

## 2024-11-05 LAB
ALBUMIN SERPL BCP-MCNC: 2 G/DL (ref 3.5–5.2)
ALLENS TEST: ABNORMAL
ALP SERPL-CCNC: 86 U/L (ref 40–150)
ALT SERPL W/O P-5'-P-CCNC: 13 U/L (ref 10–44)
ANION GAP SERPL CALC-SCNC: 11 MMOL/L (ref 8–16)
AST SERPL-CCNC: 20 U/L (ref 10–40)
BASOPHILS # BLD AUTO: 0.04 K/UL (ref 0–0.2)
BASOPHILS NFR BLD: 0.5 % (ref 0–1.9)
BILIRUB SERPL-MCNC: 0.4 MG/DL (ref 0.1–1)
BUN SERPL-MCNC: 55 MG/DL (ref 8–23)
CALCIUM SERPL-MCNC: 8.3 MG/DL (ref 8.7–10.5)
CHLORIDE SERPL-SCNC: 110 MMOL/L (ref 95–110)
CO2 SERPL-SCNC: 26 MMOL/L (ref 23–29)
CREAT SERPL-MCNC: 1.6 MG/DL (ref 0.5–1.4)
DELSYS: ABNORMAL
DIFFERENTIAL METHOD BLD: ABNORMAL
EOSINOPHIL # BLD AUTO: 0.4 K/UL (ref 0–0.5)
EOSINOPHIL NFR BLD: 5 % (ref 0–8)
ERYTHROCYTE [DISTWIDTH] IN BLOOD BY AUTOMATED COUNT: 17.8 % (ref 11.5–14.5)
EST. GFR  (NO RACE VARIABLE): 42.8 ML/MIN/1.73 M^2
FIO2: 40
GLUCOSE SERPL-MCNC: 156 MG/DL (ref 70–110)
HCO3 UR-SCNC: 30.2 MMOL/L (ref 24–28)
HCT VFR BLD AUTO: 26.7 % (ref 40–54)
HCT VFR BLD CALC: 23 %PCV (ref 36–54)
HGB BLD-MCNC: 7.9 G/DL (ref 14–18)
IMM GRANULOCYTES # BLD AUTO: 0.09 K/UL (ref 0–0.04)
IMM GRANULOCYTES NFR BLD AUTO: 1.1 % (ref 0–0.5)
LYMPHOCYTES # BLD AUTO: 0.7 K/UL (ref 1–4.8)
LYMPHOCYTES NFR BLD: 8.2 % (ref 18–48)
MAGNESIUM SERPL-MCNC: 2.8 MG/DL (ref 1.6–2.6)
MCH RBC QN AUTO: 28.1 PG (ref 27–31)
MCHC RBC AUTO-ENTMCNC: 29.6 G/DL (ref 32–36)
MCV RBC AUTO: 95 FL (ref 82–98)
MODE: ABNORMAL
MONOCYTES # BLD AUTO: 0.8 K/UL (ref 0.3–1)
MONOCYTES NFR BLD: 10 % (ref 4–15)
NEUTROPHILS # BLD AUTO: 6.2 K/UL (ref 1.8–7.7)
NEUTROPHILS NFR BLD: 75.2 % (ref 38–73)
NRBC BLD-RTO: 0 /100 WBC
PCO2 BLDA: 44.1 MMHG (ref 35–45)
PEEP: 5
PH SMN: 7.44 [PH] (ref 7.35–7.45)
PHOSPHATE SERPL-MCNC: 3.4 MG/DL (ref 2.7–4.5)
PLATELET # BLD AUTO: 279 K/UL (ref 150–450)
PMV BLD AUTO: 10.1 FL (ref 9.2–12.9)
PO2 BLDA: 87 MMHG (ref 80–100)
POC BE: 6 MMOL/L
POC SATURATED O2: 97 % (ref 95–100)
POC TCO2: 32 MMOL/L (ref 23–27)
POCT GLUCOSE: 127 MG/DL (ref 70–110)
POCT GLUCOSE: 150 MG/DL (ref 70–110)
POCT GLUCOSE: 153 MG/DL (ref 70–110)
POCT GLUCOSE: 160 MG/DL (ref 70–110)
POCT GLUCOSE: 99 MG/DL (ref 70–110)
POTASSIUM SERPL-SCNC: 4.2 MMOL/L (ref 3.5–5.1)
PROT SERPL-MCNC: 5.9 G/DL (ref 6–8.4)
PS: 5
RBC # BLD AUTO: 2.81 M/UL (ref 4.6–6.2)
SAMPLE: ABNORMAL
SITE: ABNORMAL
SODIUM SERPL-SCNC: 147 MMOL/L (ref 136–145)
SPONT RATE: 20
WBC # BLD AUTO: 8.27 K/UL (ref 3.9–12.7)

## 2024-11-05 PROCEDURE — 25000003 PHARM REV CODE 250

## 2024-11-05 PROCEDURE — 97167 OT EVAL HIGH COMPLEX 60 MIN: CPT

## 2024-11-05 PROCEDURE — 80053 COMPREHEN METABOLIC PANEL: CPT

## 2024-11-05 PROCEDURE — 94761 N-INVAS EAR/PLS OXIMETRY MLT: CPT

## 2024-11-05 PROCEDURE — 99900026 HC AIRWAY MAINTENANCE (STAT)

## 2024-11-05 PROCEDURE — 94003 VENT MGMT INPAT SUBQ DAY: CPT

## 2024-11-05 PROCEDURE — 82803 BLOOD GASES ANY COMBINATION: CPT

## 2024-11-05 PROCEDURE — 94640 AIRWAY INHALATION TREATMENT: CPT

## 2024-11-05 PROCEDURE — 25000242 PHARM REV CODE 250 ALT 637 W/ HCPCS

## 2024-11-05 PROCEDURE — 94668 MNPJ CHEST WALL SBSQ: CPT

## 2024-11-05 PROCEDURE — 99900035 HC TECH TIME PER 15 MIN (STAT)

## 2024-11-05 PROCEDURE — 63600175 PHARM REV CODE 636 W HCPCS: Mod: JZ,JG

## 2024-11-05 PROCEDURE — 83735 ASSAY OF MAGNESIUM: CPT

## 2024-11-05 PROCEDURE — 97162 PT EVAL MOD COMPLEX 30 MIN: CPT

## 2024-11-05 PROCEDURE — 97112 NEUROMUSCULAR REEDUCATION: CPT

## 2024-11-05 PROCEDURE — 20000000 HC ICU ROOM

## 2024-11-05 PROCEDURE — 63600175 PHARM REV CODE 636 W HCPCS

## 2024-11-05 PROCEDURE — 84100 ASSAY OF PHOSPHORUS: CPT

## 2024-11-05 PROCEDURE — 27100171 HC OXYGEN HIGH FLOW UP TO 24 HOURS

## 2024-11-05 PROCEDURE — 85025 COMPLETE CBC W/AUTO DIFF WBC: CPT

## 2024-11-05 PROCEDURE — 27200966 HC CLOSED SUCTION SYSTEM

## 2024-11-05 PROCEDURE — 25000003 PHARM REV CODE 250: Performed by: INTERNAL MEDICINE

## 2024-11-05 PROCEDURE — 99233 SBSQ HOSP IP/OBS HIGH 50: CPT | Mod: GC,,, | Performed by: PSYCHIATRY & NEUROLOGY

## 2024-11-05 PROCEDURE — 36600 WITHDRAWAL OF ARTERIAL BLOOD: CPT

## 2024-11-05 RX ORDER — INSULIN ASPART 100 [IU]/ML
9 INJECTION, SOLUTION INTRAVENOUS; SUBCUTANEOUS EVERY 4 HOURS
Start: 2024-11-05 | End: 2025-11-05

## 2024-11-05 RX ORDER — LEVETIRACETAM 100 MG/ML
750 SOLUTION ORAL 2 TIMES DAILY
Qty: 600 ML | Refills: 11 | Status: SHIPPED | OUTPATIENT
Start: 2024-11-05 | End: 2025-11-05

## 2024-11-05 RX ORDER — LACOSAMIDE 200 MG/1
200 TABLET ORAL EVERY 12 HOURS
Start: 2024-11-05 | End: 2025-11-05

## 2024-11-05 RX ORDER — PHENYLEPHRINE HCL IN 0.9% NACL 1 MG/10 ML
SYRINGE (ML) INTRAVENOUS
Status: DISPENSED
Start: 2024-11-05 | End: 2024-11-06

## 2024-11-05 RX ORDER — INSULIN ASPART 100 [IU]/ML
0-10 INJECTION, SOLUTION INTRAVENOUS; SUBCUTANEOUS EVERY 4 HOURS PRN
Start: 2024-11-05 | End: 2025-11-05

## 2024-11-05 RX ORDER — SODIUM CHLORIDE FOR INHALATION 3 %
4 VIAL, NEBULIZER (ML) INHALATION EVERY 6 HOURS
Start: 2024-11-05 | End: 2024-12-05

## 2024-11-05 RX ORDER — DULOXETIN HYDROCHLORIDE 60 MG/1
60 CAPSULE, DELAYED RELEASE ORAL DAILY
Qty: 30 CAPSULE | Refills: 11 | Status: SHIPPED | OUTPATIENT
Start: 2024-11-06 | End: 2025-11-06

## 2024-11-05 RX ORDER — SILODOSIN 8 MG/1
8 CAPSULE ORAL DAILY
Start: 2024-11-06 | End: 2025-11-06

## 2024-11-05 RX ORDER — HEPARIN SODIUM 5000 [USP'U]/ML
5000 INJECTION, SOLUTION INTRAVENOUS; SUBCUTANEOUS EVERY 8 HOURS
Start: 2024-11-05 | End: 2024-12-05

## 2024-11-05 RX ORDER — CARVEDILOL 3.12 MG/1
3.12 TABLET ORAL 2 TIMES DAILY
Qty: 180 TABLET | Refills: 3 | Status: SHIPPED | OUTPATIENT
Start: 2024-11-05 | End: 2025-11-05

## 2024-11-05 RX ORDER — FAMOTIDINE 20 MG/1
20 TABLET, FILM COATED ORAL DAILY
Start: 2024-11-06 | End: 2025-11-06

## 2024-11-05 RX ORDER — ATORVASTATIN CALCIUM 40 MG/1
40 TABLET, FILM COATED ORAL DAILY
Qty: 90 TABLET | Refills: 3 | Status: SHIPPED | OUTPATIENT
Start: 2024-11-06 | End: 2025-11-06

## 2024-11-05 RX ORDER — NAPROXEN SODIUM 220 MG/1
81 TABLET, FILM COATED ORAL DAILY
Start: 2024-11-06 | End: 2025-11-06

## 2024-11-05 RX ORDER — AMOXICILLIN 250 MG
2 CAPSULE ORAL 2 TIMES DAILY
Start: 2024-11-05 | End: 2024-12-05

## 2024-11-05 RX ORDER — INSULIN GLARGINE 100 [IU]/ML
25 INJECTION, SOLUTION SUBCUTANEOUS DAILY
Start: 2024-11-06 | End: 2025-11-06

## 2024-11-05 RX ADMIN — INSULIN ASPART 9 UNITS: 100 INJECTION, SOLUTION INTRAVENOUS; SUBCUTANEOUS at 01:11

## 2024-11-05 RX ADMIN — SODIUM CHLORIDE SOLN NEBU 3% 4 ML: 3 NEBU SOLN at 12:11

## 2024-11-05 RX ADMIN — LABETALOL HYDROCHLORIDE 10 MG: 5 INJECTION, SOLUTION INTRAVENOUS at 10:11

## 2024-11-05 RX ADMIN — HEPARIN SODIUM 5000 UNITS: 5000 INJECTION INTRAVENOUS; SUBCUTANEOUS at 01:11

## 2024-11-05 RX ADMIN — SODIUM CHLORIDE SOLN NEBU 3% 4 ML: 3 NEBU SOLN at 07:11

## 2024-11-05 RX ADMIN — HEPARIN SODIUM 5000 UNITS: 5000 INJECTION INTRAVENOUS; SUBCUTANEOUS at 06:11

## 2024-11-05 RX ADMIN — INSULIN ASPART 2 UNITS: 100 INJECTION, SOLUTION INTRAVENOUS; SUBCUTANEOUS at 09:11

## 2024-11-05 RX ADMIN — LEVETIRACETAM 750 MG: 500 SOLUTION ORAL at 09:11

## 2024-11-05 RX ADMIN — DULOXETINE HYDROCHLORIDE 60 MG: 60 CAPSULE, DELAYED RELEASE ORAL at 08:11

## 2024-11-05 RX ADMIN — LACOSAMIDE 200 MG: 50 TABLET, FILM COATED ORAL at 09:11

## 2024-11-05 RX ADMIN — SILODOSIN 8 MG: 8 CAPSULE ORAL at 08:11

## 2024-11-05 RX ADMIN — INSULIN GLARGINE 25 UNITS: 100 INJECTION, SOLUTION SUBCUTANEOUS at 08:11

## 2024-11-05 RX ADMIN — INSULIN ASPART 9 UNITS: 100 INJECTION, SOLUTION INTRAVENOUS; SUBCUTANEOUS at 08:11

## 2024-11-05 RX ADMIN — ATORVASTATIN CALCIUM 40 MG: 40 TABLET, FILM COATED ORAL at 08:11

## 2024-11-05 RX ADMIN — HEPARIN SODIUM 5000 UNITS: 5000 INJECTION INTRAVENOUS; SUBCUTANEOUS at 09:11

## 2024-11-05 RX ADMIN — INSULIN ASPART 9 UNITS: 100 INJECTION, SOLUTION INTRAVENOUS; SUBCUTANEOUS at 04:11

## 2024-11-05 RX ADMIN — INSULIN ASPART 2 UNITS: 100 INJECTION, SOLUTION INTRAVENOUS; SUBCUTANEOUS at 08:11

## 2024-11-05 RX ADMIN — LEVETIRACETAM 750 MG: 500 SOLUTION ORAL at 08:11

## 2024-11-05 RX ADMIN — ASPIRIN 81 MG CHEWABLE TABLET 81 MG: 81 TABLET CHEWABLE at 08:11

## 2024-11-05 RX ADMIN — INSULIN ASPART 9 UNITS: 100 INJECTION, SOLUTION INTRAVENOUS; SUBCUTANEOUS at 11:11

## 2024-11-05 RX ADMIN — INSULIN ASPART 9 UNITS: 100 INJECTION, SOLUTION INTRAVENOUS; SUBCUTANEOUS at 09:11

## 2024-11-05 RX ADMIN — CARVEDILOL 3.12 MG: 3.12 TABLET, FILM COATED ORAL at 09:11

## 2024-11-05 RX ADMIN — CARVEDILOL 3.12 MG: 3.12 TABLET, FILM COATED ORAL at 08:11

## 2024-11-05 RX ADMIN — FAMOTIDINE 20 MG: 20 TABLET ORAL at 08:11

## 2024-11-05 RX ADMIN — LACOSAMIDE 200 MG: 50 TABLET, FILM COATED ORAL at 08:11

## 2024-11-05 NOTE — ASSESSMENT & PLAN NOTE
LEANDRO on CKD, Cr 1.8 on initial presentation to ED    Recent Labs     11/03/24  0121 11/04/24  0053 11/05/24  0221   CREATININE 1.7* 1.7* 1.6*   EGFRNORACEVR 39.8* 39.8* 42.8*        Plan  - LEANDRO is resolved, increased to 1.7  - Avoid nephrotoxins and renally dose meds for GFR listed above  - Monitor urine output, serial BMP, and adjust therapy as needed  - Q1H I&Os  - Free water boluses -- 400cc QID  - Pt has required straight catheterization during ICU stay however is currently voiding spontaneously; no need for rivers catheter placement at this time.

## 2024-11-05 NOTE — PT/OT/SLP DISCHARGE
Physical Therapy Discharge Summary    Name: Percy Ramirez  MRN: [id number]   Principal Problem: Traumatic right-sided intracerebral hemorrhage without loss of consciousness     Patient Discharged from acute Physical Therapy on 11/5/2024.    Assessment:     Percy Ramirez is a 84 y.o. male admitted with a medical diagnosis of Traumatic right-sided intracerebral hemorrhage without loss of consciousness. He presents with the following impairments/functional limitations: weakness, impaired endurance, impaired cognition, visual deficits, impaired sensation, impaired functional mobility, decreased lower extremity function, decreased upper extremity function, impaired self care skills, impaired fine motor, impaired cardiopulmonary response to activity, decreased ROM. PT evaluation completed this date to identify if pt is an appropriate candidate for acute PT services. Pt received with HOB elevated with eyes closed. PT completed facial hygiene to encourage sensory stimulation and aid in increasing arousal with no improvements noted. Pt with absent command following, absent active BLE movement noted, and no attempts at verbal or nonverbal communication throughout session despite maximal attempts at gaining active participation. PT and tech transferred pt into long sitting in hospital bed with brief period of eye opening though B upward gaze present. At current functional status, pt not appropriate for acute PT services. Will discharge PT orders at this time. RN and medical team notified.     Subjective     Chief Complaint: Unable to assess, patient non-verbal  Patient/Family Comments/Goals: Unable to assess, patient non-verbal  Pain/Comfort:  Pain Rating 1: 0/10    Objective:     Communicated with RN prior to session. Patient found HOB elevated with blood pressure cuff, pulse ox (continuous), telemetry, SCD, pressure relief boots, PureWick, peripheral IV, Tracheostomy, PEG Tube upon PT entry to room.     General Precautions:  Standard, aspiration, fall, NPO  Orthopedic Precautions: N/A  Braces: N/A    Functional Mobility:  Bed Mobility:  Verbal cues for sequencing and technique  Supine to Long Sit: dependence of 2 persons for trunk management  Long Sit to Supine: dependence of 2 persons for trunk management    AM-PAC 6 CLICK MOBILITY  Turning over in bed (including adjusting bedclothes, sheets and blankets)?: 1  Sitting down on and standing up from a chair with arms (e.g., wheelchair, bedside commode, etc.): 1  Moving from lying on back to sitting on the side of the bed?: 1  Moving to and from a bed to a chair (including a wheelchair)?: 1  Need to walk in hospital room?: 1  Climbing 3-5 steps with a railing?: 1  Basic Mobility Total Score: 6     Therapeutic Activities and Exercises:  Patient educated on role of acute care PT and PT POC, safety while in hospital including calling nurse for mobility, and call light usage  Pt educated on importance of maximal participation in therapy session in order to reduce negative effects of prolonged sedentary positioning.   Answered all questions within PT scope of practice and addressed functional mobility concerns.    Patient left HOB elevated with all lines intact, call button in reach, and RN notified.    GOALS:   Multidisciplinary Problems       Physical Therapy Goals          Problem: Physical Therapy    Goal Priority Disciplines Outcome Interventions   Physical Therapy Goal     PT, PT/OT Unable to Meet                        Reasons for Discontinuation of Therapy Services  Patient is unable to continue work toward goals because of medical or psychosocial complications.      Plan:     Patient Discharged from acute PT services    11/5/2024    Time Tracking:     PT Received On: 11/05/24  PT Start Time: 1101     PT Stop Time: 1110  PT Total Time (min): 9 min     Billable Minutes: Evaluation 9      Treatment Type: Evaluation  PT/PTA: PT     Number of PTA visits since last PT visit: 0      11/05/2024

## 2024-11-05 NOTE — PLAN OF CARE
[facility]    FACILITY TRANSFER ORDERS      Patient Name: Percy Ramirez  YOB: 1940    PCP: Kasie Edmondson MD   PCP Address: [address] [place], [place] [address] / [id number]  PCP Phone Number: [phone]  PCP Fax: [phone]    Encounter Date: 11/08/2024    Admit to: [facility]    Vital Signs:  Routine    Diagnoses:   Active Hospital Problems    Diagnosis  POA    *Traumatic right-sided intracerebral hemorrhage without loss of consciousness [S06.340A]  Yes    Debility [R53.81]  Yes    Tracheostomy present [Z93.0]  Not Applicable    PEG (percutaneous endoscopic gastrostomy) status [Z93.1]  Not Applicable    Hyperkalemia [E87.5]  No    Leukocytosis [D72.829]  No    Altered mental status [R41.82]  Yes    Intracranial hemorrhage [I62.9]  Yes    Focal seizures [R56.9]  No    Subdural hematoma [S06.5XAA]  Yes    LEANDRO (acute kidney injury) [N17.9]  Yes    Chronic kidney disease, stage 3a [N18.31]  Yes    ILD (interstitial lung disease) [J84.9]  Yes     -Emphysematous changes on ct along with basilar reticulation.  pft with moderate obstructive and mild restrictive physiology.  dlco significantly reduced (pt on home oxygen)  Pft with improving fvc and tlc      Chronic combined systolic and diastolic heart failure [I50.42]  Yes     -EF 30-40%  follow by Dr. Valderrama      Persistent atrial fibrillation [I48.19]  Yes     - on coumadin  - followed by [facility] [place]      Hyperlipidemia LDL goal <100 [E78.5]  Yes    Obstructive sleep apnea treated with BiPAP [G47.33]  Yes     ahi of 31.7.  Patient is using and benefiting from bipap 16/10.         Coronary artery disease [I25.10]  Yes     - followed by cardiology, Dr. 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 Aquino at       Benign hypertension with chronic kidney  disease, stage III [I12.9, N18.30]  Yes      Resolved Hospital Problems   No resolved problems to display.       Allergies:  Review of patient's allergies indicates:   Allergen Reactions    Aricept odt [donepezil] Diarrhea and Nausea And Vomiting    Codeine Nausea Only     weak    Ranexa [ranolazine]        Diet:  NPO    1) Tube Feeds- Diabetisource @ 60 mL/hr due to Propofol turned off, will provide 1728 kcal, 86 g protein, 1178 mL H2O per day              - Free water flushes per MD orders     Activities: Bed rest    Goals of Care Treatment Preferences:  Code Status: Full Code    Living Will: Yes               Labs:   Per MD orders      CONSULTS:    Physical Therapy to evaluate and treat. , Occupational Therapy to evaluate and treat., and  to evaluate for community resources/long-range planning.    MISCELLANEOUS CARE:  PEG Care: Clean site every 24 hours. , Routine Skin for Bedridden Patients: Apply moisture barrier cream to all skin folds and wet areas in perineal area daily and after baths and all bowel movements.   Tracheostomy care        WOUND CARE ORDERS  Yes: Surgical Wound:  Location: R lateral head    Consult ET nurse        Apply the following to wound:   Other: Routine surgical site cleaning (frequency) -- daily     Medications: Review discharge medications with patient and family and provide education.         Medication List        START taking these medications      aspirin 81 MG Chew  1 tablet (81 mg total) by Per G Tube route once daily.  Start taking on: November 6, 2024     famotidine 20 MG tablet  Commonly known as: PEPCID  1 tablet (20 mg total) by Per G Tube route once daily.  Start taking on: November 6, 2024     heparin (porcine) 5,000 unit/mL injection  Inject 1 mL (5,000 Units total) into the skin every 8 (eight) hours.     * insulin aspart U-100 100 unit/mL (3 mL) Inpn pen  Commonly known as: NovoLOG  Inject 0-10 Units into the skin every 4 (four) hours as needed  (Hyperglycemia).     * insulin aspart U-100 100 unit/mL (3 mL) Inpn pen  Commonly known as: NovoLOG  Inject 9 Units into the skin every 4 (four) hours.     insulin glargine U-100 (Lantus) 100 unit/mL (3 mL) Inpn pen  Inject 25 Units into the skin once daily.  Start taking on: November 6, 2024     lacosamide 200 mg Tab tablet  Commonly known as: VIMPAT  1 tablet (200 mg total) by Per G Tube route every 12 (twelve) hours.     levetiracetam 500 mg/5 mL (5 mL) Soln  7.5 mLs (750 mg total) by Per G Tube route 2 (two) times daily.  Replaces: levETIRAcetam 500 MG Tab     senna-docusate 8.6-50 mg 8.6-50 mg per tablet  Commonly known as: PERICOLACE  2 tablets by Per G Tube route 2 (two) times daily.     silodosin 8 mg Cap capsule  Commonly known as: RAPAFLO  1 capsule (8 mg total) by Per G Tube route once daily.  Start taking on: November 6, 2024     sodium chloride 3% 3 % nebulizer solution  Take 4 mLs by nebulization every 6 (six) hours.           * This list has 2 medication(s) that are the same as other medications prescribed for you. Read the directions carefully, and ask your doctor or other care provider to review them with you.                CHANGE how you take these medications      atorvastatin 40 MG tablet  Commonly known as: LIPITOR  1 tablet (40 mg total) by Per G Tube route once daily.  Start taking on: November 6, 2024  What changed:   how to take this  when to take this     carvediloL 3.125 MG tablet  Commonly known as: COREG  1 tablet (3.125 mg total) by Per G Tube route 2 (two) times daily.  What changed:   how to take this  when to take this     DULoxetine 60 MG capsule  Commonly known as: CYMBALTA  Take 1 capsule (60 mg total) by mouth once daily. Give per PEG  Start taking on: November 6, 2024  What changed: additional instructions            STOP taking these medications      acetaminophen 500 MG tablet  Commonly known as: TYLENOL     albuterol 2.5 mg /3 mL (0.083 %) nebulizer solution  Commonly known  "as: PROVENTIL     albuterol 90 mcg/actuation inhaler  Commonly known as: PROVENTIL/VENTOLIN HFA     BD INSULIN SYRINGE ULTRA-FINE 1/2 mL 31 gauge x 15/64" Syrg  Generic drug: insulin syringe-needle U-100     BD ULTRA-FINE SHORT PEN NEEDLE 31 gauge x 5/16" Ndle  Generic drug: pen needle, diabetic     blood sugar diagnostic Strp     CERAVE DAILY MOISTURIZING Lotn  Generic drug: ceramides 1,3,6-II     cinnamon bark 500 mg capsule     clopidogreL 75 mg tablet  Commonly known as: PLAVIX     diclofenac sodium 1 % Gel  Commonly known as: VOLTAREN     fluticasone propionate 50 mcg/actuation nasal spray  Commonly known as: FLONASE     furosemide 40 MG tablet  Commonly known as: LASIX     gabapentin 300 MG capsule  Commonly known as: NEURONTIN     glimepiride 4 MG tablet  Commonly known as: AMARYL     HYDROcodone-acetaminophen 7.5-325 mg per tablet  Commonly known as: NORCO     insulin NPH-insulin regular (70/30) 100 unit/mL (70-30) injection     insulin syringe-needle U-100 0.3 mL 31 gauge x 15/64" Syrg     insulin syringe-needle U-100 0.5 mL 31 gauge x 5/16" Syrg     isosorbide mononitrate 30 MG 24 hr tablet  Commonly known as: IMDUR     lancets Misc     levETIRAcetam 500 MG Tab  Commonly known as: KEPPRA  Replaced by: levetiracetam 500 mg/5 mL (5 mL) Soln     LIDOcaine 5 %  Commonly known as: LIDODERM     meclizine 12.5 mg tablet  Commonly known as: ANTIVERT     methocarbamoL 500 MG Tab  Commonly known as: ROBAXIN     mupirocin 2 % ointment  Commonly known as: BACTROBAN     nitroGLYCERIN 0.4 MG SL tablet  Commonly known as: NITROSTAT     omeprazole 20 MG capsule  Commonly known as: PRILOSEC     pravastatin 20 MG tablet  Commonly known as: PRAVACHOL     PRESERVISION LUTEIN ORAL     spironolactone 25 MG tablet  Commonly known as: ALDACTONE     STIOLTO RESPIMAT 2.5-2.5 mcg/actuation Mist  Generic drug: tiotropium-olodateroL     triamcinolone acetonide 0.1% 0.1 % cream  Commonly known as: KENALOG     valsartan 40 MG " tablet  Commonly known as: DIOVAN     warfarin 5 MG tablet  Commonly known as: COUMADIN                Immunizations Administered as of 11/5/2024       Name Date Dose VIS Date Route Exp Date    COVID-19, vector-nr, rS-Ad26 (J&J) 12/15/2021 0.5 mL 10/20/2021 Intramuscular --    Site: Left arm     : [employer]     Lot: [id number]     Comment: Adminis     COVID-19, vector-nr, rS-Ad26 (J&J) 3/8/2021 0.5 mL -- Intramuscular --    Site: Left arm     : [employer]     Lot: [id number]     Comment: Adminis             This patient has had both covid vaccinations    Some patients may experience side effects after vaccination.  These may include fever, headache, muscle or joint aches.  Most symptoms resolve with 24-48 hours and do not require urgent medical evaluation unless they persist for more than 72 hours or symptoms are concerning for an unrelated medical condition.          _________________________________  Maricruz Vasquez DO  11/05/2024

## 2024-11-05 NOTE — SUBJECTIVE & OBJECTIVE
Interval History: No acute overnight events. Bowel regimen skipped this morning 2/2 an episode of diarrhea. Pt tolerating trach collar well.     Review of Systems   Unable to perform ROS: Intubated       Objective:     Vitals:  Temp: 98 °F (36.7 °C)  Pulse: 74  BP: (!) 163/70  MAP (mmHg): 100  Resp: 15  SpO2: (!) 94 %  Oxygen Concentration (%): 60  Vent Mode: Spont  Pressure Support: 5 cmH20  PEEP/CPAP: 5 cmH20  Peak Airway Pressure: 9.5 cmH20  Mean Airway Pressure: 5.7 cmH20  Plateau Pressure: 13 cmH20    Temp  Min: 98 °F (36.7 °C)  Max: 99.1 °F (37.3 °C)  Pulse  Min: 68  Max: 90  BP  Min: 102/57  Max: 176/86  MAP (mmHg)  Min: 76  Max: 123  Resp  Min: 11  Max: 32  SpO2  Min: 90 %  Max: 99 %  Oxygen Concentration (%)  Min: 40  Max: 60    11/04 0701 - 11/05 0700  In: 2790   Out: 1000 [Urine:1000]   Unmeasured Output  Urine Occurrence: 1  Stool Occurrence: 1  Pad Count: 3        Physical Exam  Vitals and nursing note reviewed.   Constitutional:       General: He is not in acute distress.     Appearance: He is not toxic-appearing.   Eyes:      Conjunctiva/sclera: Conjunctivae normal.      Pupils: Pupils are equal, round, and reactive to light.   Cardiovascular:      Comments: Irregularly irregular  Pulmonary:      Effort: No respiratory distress.      Comments: Trach collar, breathing spontaneously  Abdominal:      General: There is no distension.      Palpations: Abdomen is soft.      Tenderness: There is no abdominal tenderness.      Comments: PEG in place   Musculoskeletal:         General: Swelling (UE) present.   Skin:     General: Skin is dry.   Neurological:      Comments: Unarousable  PERRL  +Gag  BLE flex to pain            Medications:  [id number]    Scheduledaspirin, 81 mg, Daily  atorvastatin, 40 mg, Daily  carvediloL, 3.125 mg, BID  DULoxetine, 60 mg, Daily  famotidine, 20 mg, Daily  heparin (porcine), 5,000 Units, Q8H  insulin aspart U-100, 9 Units, 6 times per day  insulin glargine U-100, 25 Units,  Daily  lacosamide, 200 mg, Q12H  levetiracetam, 750 mg, BID  senna-docusate 8.6-50 mg, 2 tablet, BID  silodosin, 8 mg, Daily  sodium chloride 3%, 4 mL, Q6H    PRNacetaminophen, 650 mg, Q6H PRN  D10W, , Continuous PRN  dextrose 10%, 12.5 g, PRN  dextrose 10%, 25 g, PRN  glucagon (human recombinant), 1 mg, PRN  hydrALAZINE, 10 mg, Q4H PRN  insulin aspart U-100, 0-10 Units, Q4H PRN  labetalol, 10 mg, Q4H PRN  ondansetron, 4 mg, Q8H PRN      Today I personally reviewed pertinent medications, lines/drains/airways, laboratory results, notably:    Diet  Diet NPO Except for: Medication  Diet NPO Except for: Medication

## 2024-11-05 NOTE — ASSESSMENT & PLAN NOTE
Creatine stable for now. BMP reviewed- noted Estimated Creatinine Clearance: 37.5 mL/min (A) (based on SCr of 1.6 mg/dL (H)). according to latest data. Based on current GFR, CKD stage is stage 3 - GFR 30-59.  Monitor UOP and serial BMP and adjust therapy as needed. Renally dose meds. Avoid nephrotoxic medications and procedures.

## 2024-11-05 NOTE — PLAN OF CARE
PT evaluation complete and no goals established. Patient is not appropriate for acute PT services at this time. Will discharge PT orders.     Problem: Physical Therapy  Goal: Physical Therapy Goal  Outcome: Unable to Meet     11/5/2024

## 2024-11-05 NOTE — PLAN OF CARE
"[facility] Care Plan    POC reviewed with Percy Ramirez and family at 0300. Patient and Family verbalized understanding. Questions and concerns addressed. No acute events today. Pt progressing toward goals. Will continue to monitor. See below and flowsheets for full assessment and VS info.             Is this a stroke patient? yes- Stroke booklet reviewed with patient and family, risk factors identified for patient and stroke booklet remains at bedside for ongoing education.     Care individualization: Stroke education    Neuro:  [place] Coma Scale  Best Eye Response: 1-->(E1) none  Best Motor Response: 2-->(M2) extension to pain  Best Verbal Response: 1-->(V1) none  [place] Coma Scale Score: 4  Assessment Qualifiers: no eye obstruction present  Pupil PERRLA: yes     24 hr Temp:  [98 °F (36.7 °C)-99.2 °F (37.3 °C)]     CV:   Rhythm: atrial rhythm  BP goals:   SBP < 160  MAP > 65    Resp:      Vent Mode: Spont  Set Rate: 16 BPM  Oxygen Concentration (%): 40  Vt Set: 500 mL  PEEP/CPAP: 5 cmH20  Pressure Support: 5 cmH20    Plan: trach in place    GI/:     Diet/Nutrition Received: tube feeding  Last Bowel Movement: 11/04/24  Voiding Characteristics: external catheter    Intake/Output Summary (Last 24 hours) at 11/5/2024 0448  Last data filed at 11/5/2024 0301  Gross per 24 hour   Intake 2690 ml   Output --   Net 2690 ml     Unmeasured Output  Urine Occurrence: 1  Stool Occurrence: 1  Pad Count: 3    Labs/Accuchecks:  Recent Labs   Lab 11/05/24 0221 11/05/24  0429   WBC 8.27  --    RBC 2.81*  --    HGB 7.9*  --    HCT 26.7* 23*     --       Recent Labs   Lab 11/05/24 0221   *   K 4.2   CO2 26      BUN 55*   CREATININE 1.6*   ALKPHOS 86   ALT 13   AST 20   BILITOT 0.4    No results for input(s): "PROTIME", "INR", "APTT", "HEPANTIXA" in the last 168 hours. No results for input(s): "CPK", "CPKMB", "TROPONINI", "MB" in the last 168 hours.    Electrolytes: N/A - electrolytes WDL  Accuchecks: " Q4H    Gtts:      LDA/Wounds:    Mykel Risk Assessment  Sensory Perception: 1-->completely limited  Moisture: 3-->occasionally moist  Activity: 1-->bedfast  Mobility: 1-->completely immobile  Nutrition: 3-->adequate  Friction and Shear: 2-->potential problem  Mykel Score: 11  Is your mykel score 12 or less? yes    Nurses Note -- 4 Eyes    Is there altered skin present?  yes     Please check the following boxes that apply:   [] LDA Added if Not in Epic (Describe Wound)   [] New Altered Skin Integrity was Present on Admit and Documented in LDA   [] Wound Image Taken    Wound Care Consulted? Yes     Second RN/Staff Member:  RADHA Ortiz

## 2024-11-05 NOTE — PLAN OF CARE
KONRAD spoke to Maria Antonia Rolle from [facility] to see if pt can be transported to LTAC today.   Maria Antonia indicated that they are still waiting on authorization from [facility] but he is accepted to the facility.       Discharge Plan A and Plan B have been determined by review of patient's clinical status, future medical and therapeutic needs, and coverage/benefits for post-acute care in coordination with multidisciplinary team members.    Haleigh Perdomo MSW, FIONAW  [facility]  Case Management Dept.

## 2024-11-05 NOTE — PROGRESS NOTES
[facility] - Neuro Critical Care  Neurocritical Care  Progress Note    Admit Date: 10/17/2024  Service Date: 11/05/2024  Length of Stay: 19    Subjective:     Chief Complaint: Traumatic right-sided intracerebral hemorrhage without loss of consciousness    History of Present Illness: Percy Ramriez is a 83M PMHx of HTN, HLD, DM2, CAD s/p CABG x2, Afib on Coumadin, ILD on 4L of O2, HFrEF (45%), presenting as a transfer for a large traumatic R frontal IPH. Pt initially presented to [facility] ED yesterday ago after mechanical fall and was found to have 4mm parafalcine SDH. Repeat CTH was stable. Pt was discharged home on keppra and instructed to hold his blood thinners. On 10/17/24 pt was lethargic and fell. He was brought back to  ED. Blood glucose was >600 and he was febrile (101F). CTH revealed 5cm R frontal IPH with 7mm MLS. Kcentra, 10mg vitamin K, and 3% HTS bolus were given. He was transferred to [facility] for higher level of care.     Hospital Course: 10/19/2024: POD #1 from both his MI LS as well as his right subdural/intraparenchymal hemorrhage evacuation.  He is still intubated and although sedation is off, he is minimally responsive. Coreg doubled. NG tube placed and tube feeds started. Still on insulin drip.  10/20/2024: POD #2 s/p crani for SDH evacuation. Leukocytosis noted post-op, no accompanying fever/chills. Straight cath x1. Tachycardia responding to IVF boluses. Tube feeds started, will titrate to goal. Insulin gtt continued, plans to transition to subQ tomorrow.  10/21/2021: POD#3. EEG placed this morning, revealing multiple r-sided seizures. Keppra load completed and maintenance dose increased. Concern for LUE no longer WD, stat head CT showing new/increased hyperdensity in resection bed w/o worsening mass effect or MLS. 1L LR given for LEANDRO. Will continue to monitor Na.   10/22/24: POD#4. Addl seizures despite keppra load. Vimpat and propofol started yesterday with significant reduction in number.  1U PRBC transfused overnight. Required levo for short period of time after initiation of propofol.  10/23/24: POD#5.  Patient continues to be hyponatremic.  Free water volume increased.  Patient with total of 11 seizures yesterday.  Vimpat and propofol dosing increased with improvement.  10/24/24: POD#6. NGT adjusted overnight. No additional seizures on current AEM regimen. Platelets and Hgb decreasing, will order peripheral smear to assess for HIT  10/25/2024 POD#7. Addl seizure, Onfi added to AEM regimen. Drains removed by NSGY. Will attempt to wean propofol tomorrow.   10/26/2024 POD#8. MRI complete yx. Weaning propofol today.   10/27/2024 POD#9. Patient began having seizures again overnight following cessation of propofol gtt. Resumed propofol at 10cc/hr for now, increased Keppra dose, and plan to attempt to wean again tomorrow. Persistent hyperglycemia on tube feeds, optimizing insulin regimen. Leukocytosis pending respiratory cultures.  10/28/2024 POD10. No addl seizures overnight. Propofol turned off this morning. Clobazam dosing decreased as well. Resp cultures negative, but will obtain blood and urine cultures. Cont abx. NSGY signing off.   10/29/2024 POD11. No seizures. Clobazam down to 10mg QHS. Infiltrate developing RLL, sputum culture likely due to colonization of resp tract per ID.   10/30/2024 POD12. No seizures. Clobazam discontinued. Abx discontinued, as no significant sputum production and resp status is stable. Discussion with family today - trach/PEG likely tomorrow.   10/31/2024 POD13. Single apneic episode yx evening with no EEG correlation. Spontaneously resolved. To OR today for trach/PEG. EEG cap to be removed.   11/1/24 POD14. Hyperkalemia overnight, resolving with lokelma and albuterol 10mg x1.   11/2/24 POD15. NAEON. Remains stable.  11/3/24 POD16. NAEON. Stable. Increased silodosin and optimized bowel regimen. Trach collar trials daily.  11/4/24 POD17: NAEON. Stable, spontaneously  voiding. Free water flushes increased to 400cc QID. Q4 neuro checks. Tolerating trach collar well during the day.   11/5/24 POD 18: NAEON. Stable, tolerating trach collar well. Bowel regimen held this morning 2/2 an episode of diarrhea.     Interval History: No acute overnight events. Bowel regimen skipped this morning 2/2 an episode of diarrhea. Pt tolerating trach collar well.     Review of Systems   Unable to perform ROS: Intubated       Objective:     Vitals:  Temp: 98 °F (36.7 °C)  Pulse: 74  BP: (!) 163/70  MAP (mmHg): 100  Resp: 15  SpO2: (!) 94 %  Oxygen Concentration (%): 60  Vent Mode: Spont  Pressure Support: 5 cmH20  PEEP/CPAP: 5 cmH20  Peak Airway Pressure: 9.5 cmH20  Mean Airway Pressure: 5.7 cmH20  Plateau Pressure: 13 cmH20    Temp  Min: 98 °F (36.7 °C)  Max: 99.1 °F (37.3 °C)  Pulse  Min: 68  Max: 90  BP  Min: 102/57  Max: 176/86  MAP (mmHg)  Min: 76  Max: 123  Resp  Min: 11  Max: 32  SpO2  Min: 90 %  Max: 99 %  Oxygen Concentration (%)  Min: 40  Max: 60    11/04 0701 - 11/05 0700  In: 2790   Out: 1000 [Urine:1000]   Unmeasured Output  Urine Occurrence: 1  Stool Occurrence: 1  Pad Count: 3        Physical Exam  Vitals and nursing note reviewed.   Constitutional:       General: He is not in acute distress.     Appearance: He is not toxic-appearing.   Eyes:      Conjunctiva/sclera: Conjunctivae normal.      Pupils: Pupils are equal, round, and reactive to light.   Cardiovascular:      Comments: Irregularly irregular  Pulmonary:      Effort: No respiratory distress.      Comments: Trach collar, breathing spontaneously  Abdominal:      General: There is no distension.      Palpations: Abdomen is soft.      Tenderness: There is no abdominal tenderness.      Comments: PEG in place   Musculoskeletal:         General: Swelling (UE) present.   Skin:     General: Skin is dry.   Neurological:      Comments: Unarousable  PERRL  +Gag  BLE flex to pain            Medications:  [id number]    Scheduledaspirin,  81 mg, Daily  atorvastatin, 40 mg, Daily  carvediloL, 3.125 mg, BID  DULoxetine, 60 mg, Daily  famotidine, 20 mg, Daily  heparin (porcine), 5,000 Units, Q8H  insulin aspart U-100, 9 Units, 6 times per day  insulin glargine U-100, 25 Units, Daily  lacosamide, 200 mg, Q12H  levetiracetam, 750 mg, BID  senna-docusate 8.6-50 mg, 2 tablet, BID  silodosin, 8 mg, Daily  sodium chloride 3%, 4 mL, Q6H    PRNacetaminophen, 650 mg, Q6H PRN  D10W, , Continuous PRN  dextrose 10%, 12.5 g, PRN  dextrose 10%, 25 g, PRN  glucagon (human recombinant), 1 mg, PRN  hydrALAZINE, 10 mg, Q4H PRN  insulin aspart U-100, 0-10 Units, Q4H PRN  labetalol, 10 mg, Q4H PRN  ondansetron, 4 mg, Q8H PRN      Today I personally reviewed pertinent medications, lines/drains/airways, laboratory results, notably:    Diet  Diet NPO Except for: Medication  Diet NPO Except for: Medication    Assessment/Plan:     Neuro  * Traumatic right-sided intracerebral hemorrhage without loss of consciousness  83M PMHx of HTN, HLD, DM2, CAD s/p CABG x2, Afib on Coumadin, ILD on 4L of O2, HFrEF (45%), presenting as a transfer for a large traumatic R frontal IPH reversed with Kcentra and vitamin K with repeat INR 1.2.    Admit to [facility]  q1h neuro checks, vitals, and I&O's  CBC, CMP, mag, and phos daily   Coags, TSH, A1c, lipid panel, UA  Echo, EKG, CXR  SBP <160  Na goals > 135  NSGY signed off  SCDs; heparin for VTE prophylaxis  PT/OT/SLP when extubated  S/p Trach/PEG, neuro exam unchanged at this time  LTAC placement pending, SW on board.    Pulmonary  ILD (interstitial lung disease)  ILD on 4L of O2 at home    SpO2 goal > 88%  Duo nebs and tiotropium  Currently on trach collar during day  Continue chest physiotherapy and HTN nebs to help thin secretions    Cardiac/Vascular  Chronic combined systolic and diastolic heart failure  06/29/2024 echo with EF 45%, was previously 30-40%    Repeat echo shows his EF is still 45%  HDS    Persistent atrial fibrillation  In atrial  fibrillation on admit  Hold Coumadin in setting of acute intraparenchymal hemorrhage  Heparin subcu for VTE prophylaxis    Coronary artery disease  S/p CABG x2 in 2004  Patient has reportedly not been on Plavix recently because of his Coumadin  -Continue home Aspirin and atorvastatin      Renal/  Hyperkalemia  Hyperkalemia is likely due to LEANDRO.The patients most recent potassium results are listed below.  Recent Labs     11/03/24 0121 11/04/24 0053 11/05/24 0221   K 4.5 4.4 4.2       Plan  - Monitor for arrhythmias with EKG and/or continuous telemetry.   - Treat the hyperkalemia with Potassium Binders.   - Monitor potassium: Daily  - The patient's hyperkalemia is resolved      LEANDRO (acute kidney injury)  LEANDRO on CKD, Cr 1.8 on initial presentation to ED    Recent Labs     11/03/24 0121 11/04/24 0053 11/05/24 0221   CREATININE 1.7* 1.7* 1.6*   EGFRNORACEVR 39.8* 39.8* 42.8*        Plan  - LEANDRO is resolved, increased to 1.7  - Avoid nephrotoxins and renally dose meds for GFR listed above  - Monitor urine output, serial BMP, and adjust therapy as needed  - Q1H I&Os  - Free water boluses -- 400cc QID  - Pt has required straight catheterization during ICU stay however is currently voiding spontaneously; no need for rivers catheter placement at this time.     Chronic kidney disease, stage 3a  Creatine stable for now. BMP reviewed- noted Estimated Creatinine Clearance: 37.5 mL/min (A) (based on SCr of 1.6 mg/dL (H)). according to latest data. Based on current GFR, CKD stage is stage 3 - GFR 30-59.  Monitor UOP and serial BMP and adjust therapy as needed. Renally dose meds. Avoid nephrotoxic medications and procedures.    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 Beta hydroxybutyrate and urine ketones negative. Serum CO2 22. Given SQ insulin at OSH.  [place] [facility] [place]1c 9.9% on admit.     Resuming scheduled insulin.    Patient's  FSGs are uncontrolled due to hyperglycemia on current medication regimen.  Last A1c reviewed-   Lab Results   Component Value Date    LABA1C 6.8 10/09/2017    HGBA1C 9.9 (H) 10/17/2024     Most recent fingerstick glucose reviewed-   Recent Labs   Lab 11/05/24  0115 11/05/24  0442 11/05/24  0844 11/05/24  1150   POCTGLUCOSE 150* 160* 153* 127*       Current correctional scale  Medium  Increase anti-hyperglycemic dose as follows-   Antihyperglycemics (From admission, onward)      Start     Stop Route Frequency Ordered    11/03/24 1200  insulin aspart U-100 pen 9 Units         -- SubQ 6 times per day 11/03/24 1050    11/03/24 0900  insulin glargine U-100 (Lantus) pen 25 Units         -- SubQ Daily 11/03/24 0754    10/25/24 1133  insulin aspart U-100 pen 0-10 Units         -- SubQ Every 4 hours PRN 10/25/24 1036    10/22/24 1400  insulin regular in 0.9 % NaCl 100 unit/100 mL (1 unit/mL) infusion        Question Answer Comment   Insulin Rate Adjustment (DO NOT MODIFY ANSWER) [email or web address] [id number]    Enter initial dose from Infusion Protocol Chart (Units/hr): 1.5        10/25/24 1431 IV Continuous 10/22/24 1252    10/21/24 1145  insulin regular in 0.9 % NaCl 100 unit/100 mL (1 unit/mL) infusion        Question:  Enter initial dose from Infusion Protocol Chart (Units/hr):  Answer:  1.65    10/21/24 1343 IV Continuous 10/21/24 1042          Hold Oral hypoglycemics while patient is in the hospital.      Other  Obstructive sleep apnea treated with BiPAP  Currently with trach on vent but tolerating trach collar and breathing spontaneously during the day.  Daily ABG  No significant abnormalities on today's ABG            The patient is being Prophylaxed for:  Venous Thromboembolism with: Mechanical or Chemical  Stress Ulcer with: H2B  Ventilator Pneumonia with: not applicable    Activity Orders            Elevate HOB starting at 10/31 1445    Diet NPO Except for:  Medication: NPO starting at 10/31 0001    Turn patient starting at 10/18 0000          Full Code    Maricruz Vasquez DO  Neurocritical Care  [facility] - Neuro Critical Care

## 2024-11-05 NOTE — ASSESSMENT & PLAN NOTE
Hyperkalemia is likely due to LEANDRO.The patients most recent potassium results are listed below.  Recent Labs     11/03/24  0121 11/04/24  0053 11/05/24  0221   K 4.5 4.4 4.2       Plan  - Monitor for arrhythmias with EKG and/or continuous telemetry.   - Treat the hyperkalemia with Potassium Binders.   - Monitor potassium: Daily  - The patient's hyperkalemia is resolved

## 2024-11-05 NOTE — PLAN OF CARE
Goals remain appropriate.  Problem: Occupational Therapy  Goal: Occupational Therapy Goal  Description: Goals set 11/5 with an expiration date, 12/3:  Patient will increase functional independence with ADLs by performing:    Patient will demonstrate rolling to the right with max assist.  Not met   Patient will demonstrate rolling to the left with max assist.   Not met  Patient will demonstrate supine -sit with max assist.   Not met  Patient will demonstrate squat pivot transfers with max assist.   Not met  Patient will demonstrate grooming while seated with max assist.   Not met  Patient will demonstrate upper body dressing with max assist while seated EOB.   Not met  Patient will demonstrate lower body dressing with max assist while seated EOB.   Not met  Patient will demonstrate toileting with max assist.   Not met  Patient will demonstrate bathing while seated EOB with max assist.   Not met  Patient's family / caregiver will demonstrate independence and safety with assisting patient with self-care skills and functional mobility.     Not met  Patient's family / caregiver will demonstrate independence with providing ROM and changes in bed positioning.   Not met  Patient and/or patient's family will verbalize understanding of stroke prevention guidelines, personal risk factors and stroke warning signs via teachback method.  Not met           Outcome: Progressing

## 2024-11-05 NOTE — PLAN OF CARE
"[facility] Care Plan  POC reviewed with Percy Ramirez and family at 1400. Patient unable to verbalize understanding. No acute events today. Pt progressing toward goals. Will continue to monitor. See below and flowsheets for full assessment and VS info.             Is this a stroke patient? no    Neuro:  [place] Coma Scale  Best Eye Response: 1-->(E1) none  Best Motor Response: 2-->(M2) extension to pain  Best Verbal Response: 1-->(V1) none  Federico Coma Scale Score: 4  Assessment Qualifiers: no eye obstruction present  Pupil PERRLA: yes     24 hr Temp:  [98 °F (36.7 °C)-99.1 °F (37.3 °C)]     CV:   Rhythm: atrial rhythm  BP goals:   SBP < 160  MAP > 65    Resp:      Vent Mode: Spont  Set Rate: 16 BPM  Oxygen Concentration (%): 60  Vt Set: 500 mL  PEEP/CPAP: 5 cmH20  Pressure Support: 5 cmH20    Plan: trach in place    GI/:     Diet/Nutrition Received: tube feeding  Last Bowel Movement: 11/05/24  Voiding Characteristics: external catheter    Intake/Output Summary (Last 24 hours) at 11/5/2024 1527  Last data filed at 11/5/2024 1505  Gross per 24 hour   Intake 2900 ml   Output 1000 ml   Net 1900 ml     Unmeasured Output  Urine Occurrence: 1  Stool Occurrence: 1  Pad Count: 3    Labs/Accuchecks:  Recent Labs   Lab 11/05/24 0221 11/05/24  0429   WBC 8.27  --    RBC 2.81*  --    HGB 7.9*  --    HCT 26.7* 23*     --       Recent Labs   Lab 11/05/24 0221   *   K 4.2   CO2 26      BUN 55*   CREATININE 1.6*   ALKPHOS 86   ALT 13   AST 20   BILITOT 0.4    No results for input(s): "PROTIME", "INR", "APTT", "HEPANTIXA" in the last 168 hours. No results for input(s): "CPK", "CPKMB", "TROPONINI", "MB" in the last 168 hours.    Electrolytes: N/A - electrolytes WDL  Accuchecks: Q4H    Gtts:   D10W   Intravenous Continuous PRN           LDA/Wounds:    Mykel Risk Assessment  Sensory Perception: 1-->completely limited  Moisture: 3-->occasionally moist  Activity: 1-->bedfast  Mobility: 1-->completely " immobile  Nutrition: 3-->adequate  Friction and Shear: 2-->potential problem  Mykel Score: 11    Is your mykel score 12 or less? yes enrolled in the peach program      Nurses Note -- 4 Eyes    Is there altered skin present?  No  Second RN/Staff Member:  RADHA Obregon      Restraints:   Restraint Order  Length of Order: Order good for next 24 hours or when removed.  Date that the current order will : 10/27/24  Time that the current order will : 1623  Order Upon Application: Yes    [facility]

## 2024-11-05 NOTE — ASSESSMENT & PLAN NOTE
Initial presentation c/f HHS w glucose > 600. Beta hydroxybutyrate and urine ketones negative. Serum CO2 22. Given SQ insulin at OSH.  On arrival to [facility] [place], . Hb A1c 9.9% on admit.     Resuming scheduled insulin.    Patient's FSGs are uncontrolled due to hyperglycemia on current medication regimen.  Last A1c reviewed-   Lab Results   Component Value Date    LABA1C 6.8 10/09/2017    HGBA1C 9.9 (H) 10/17/2024     Most recent fingerstick glucose reviewed-   Recent Labs   Lab 11/05/24  0115 11/05/24  0442 11/05/24  0844 11/05/24  1150   POCTGLUCOSE 150* 160* 153* 127*       Current correctional scale  Medium  Increase anti-hyperglycemic dose as follows-   Antihyperglycemics (From admission, onward)    Start     Stop Route Frequency Ordered    11/03/24 1200  insulin aspart U-100 pen 9 Units         -- SubQ 6 times per day 11/03/24 1050    11/03/24 0900  insulin glargine U-100 (Lantus) pen 25 Units         -- SubQ Daily 11/03/24 0754    10/25/24 1133  insulin aspart U-100 pen 0-10 Units         -- SubQ Every 4 hours PRN 10/25/24 1036    10/22/24 1400  insulin regular in 0.9 % NaCl 100 unit/100 mL (1 unit/mL) infusion        Question Answer Comment   Insulin Rate Adjustment (DO NOT MODIFY ANSWER) [email or web address] [id number]    Enter initial dose from Infusion Protocol Chart (Units/hr): 1.5        10/25/24 1431 IV Continuous 10/22/24 1252    10/21/24 1145  insulin regular in 0.9 % NaCl 100 unit/100 mL (1 unit/mL) infusion        Question:  Enter initial dose from Infusion Protocol Chart (Units/hr):  Answer:  1.65    10/21/24 1343 IV Continuous 10/21/24 1042        Hold Oral hypoglycemics while patient is in the hospital.

## 2024-11-05 NOTE — PT/OT/SLP EVAL
Occupational Therapy   Evaluation    Name: Percy Ramirez  MRN: [id number]  Admitting Diagnosis: Traumatic right-sided intracerebral hemorrhage without loss of consciousness  Recent Surgery: Procedure(s) (LRB):  EGD, WITH PEG TUBE INSERTION (N/A)  CREATION, TRACHEOSTOMY (N/A) 5 Days Post-Op    Recommendations:     Discharge Recommendations: Moderate Intensity Therapy  Discharge Equipment Recommendations:  lift device, hospital bed  Barriers to discharge:  None    Assessment:     Percy Ramirez is a 84 y.o. male with a medical diagnosis of Traumatic right-sided intracerebral hemorrhage without loss of consciousness.  He presents with performance deficits affecting function: weakness, impaired endurance, impaired self care skills, impaired functional mobility, decreased coordination, visual deficits, decreased lower extremity function, decreased upper extremity function, impaired balance, decreased ROM, impaired coordination, impaired fine motor, decreased safety awareness, impaired cognition.      Rehab Prognosis: Fair; patient would benefit from acute skilled OT services to address these deficits and reach maximum level of function.       Plan:     Patient to be seen 3 x/week to address the above listed problems via neuromuscular re-education, therapeutic exercises, therapeutic activities, self-care/home management, cognitive retraining  Plan of Care Expires: 12/03/24  Plan of Care Reviewed with: patient    Subjective     Patient:  Nonverbal during session; no attempts for communication with head nods.    Occupational Profile:  Per chart review, patient resides in [place] with wife; requiring assistance with ADLs and using the following DME:  rollator, walker, electric scooter.  Patient unable to provide further occupational profile and family unavailable.      Pain/Comfort:  Pain Rating 1: 0/10  Pain Rating Post-Intervention 1: 0/10    Patients cultural, spiritual, Oriental orthodox conflicts given the current situation:  no    Objective:     Communicated with: Nurse prior to session.  Patient found supine with bed alarm, pressure relief boots, SCD, telemetry, PEG Tube, Tracheostomy, pulse ox (continuous), peripheral IV upon OT entry to room.    General Precautions: Standard, aspiration, fall, NPO, seizure  Orthopedic Precautions: N/A  Braces: N/A  Respiratory Status:  trach    Occupational Performance:    Bed Mobility:    Patient completed Rolling/Turning to Left with  dependent  Patient completed Rolling/Turning to Right with dependent  Supine-sit deferred 2* minimal eye opening, not following commands.    Functional Mobility/Transfers:  Dependent drawsheet transfers    Activities of Daily Living:  Feeding:  NPO    Grooming: dependence while supine with hand over had assist required.    Cognitive/Visual Perceptual:  Cognitive/Psychosocial Skills:     -       Oriented to: Daily orientation provided   -       Follows Commands/attention:Unable to follow commands during the session    Physical Exam:  Postural examination/scapula alignment:    -       Rounded shoulders  Upper Extremity Range of Motion:     -       Right Upper Extremity: PROM WNL  -       Left Upper Extremity: PROM WNL    AMPAC 6 Click ADL:  AMPAC Total Score: 6    Treatment & Education:  Daily orientation provided.    PROM performed bilateral UE/LEs one set x 10 rep in all planes of motion with stretches provided at end range; sustained stretch provided for ankle dorsiflexion.  Assistance and facilitation provided for upward rotation of the scapula during shoulder flexion and abduction to promote orientation of glenoid fossa of scapula to humeral head for prevention of shoulder pain.  Patient intermittently opening eyes minimally; no  tracking; dysconjugate gaze.   Patient unable to follow commands.   Provided multi-sensory stimulation to prevent sensory deprivation and improve clinical outcomes.  Positioning provided for midline orientation with bilateral UEs elevated  and heels lifted off mattress.   Family not present during the session.    Patient left supine with all lines intact, call button in reach, and bed alarm on    GOALS:   Multidisciplinary Problems       Occupational Therapy Goals          Problem: Occupational Therapy    Goal Priority Disciplines Outcome Interventions   Occupational Therapy Goal     OT, PT/OT Progressing    Description: Goals set 11/5 with an expiration date, 12/3:  Patient will increase functional independence with ADLs by performing:    Patient will demonstrate rolling to the right with max assist.  Not met   Patient will demonstrate rolling to the left with max assist.   Not met  Patient will demonstrate supine -sit with max assist.   Not met  Patient will demonstrate squat pivot transfers with max assist.   Not met  Patient will demonstrate grooming while seated with max assist.   Not met  Patient will demonstrate upper body dressing with max assist while seated EOB.   Not met  Patient will demonstrate lower body dressing with max assist while seated EOB.   Not met  Patient will demonstrate toileting with max assist.   Not met  Patient will demonstrate bathing while seated EOB with max assist.   Not met  Patient's family / caregiver will demonstrate independence and safety with assisting patient with self-care skills and functional mobility.     Not met  Patient's family / caregiver will demonstrate independence with providing ROM and changes in bed positioning.   Not met  Patient and/or patient's family will verbalize understanding of stroke prevention guidelines, personal risk factors and stroke warning signs via teachback method.  Not met                                History:     Past Medical History:   Diagnosis Date    Allergy     Arthritis     CAD, multiple vessel     DR Melissa    Cataract     Depression     History of colon polyps     Hyperlipidemia     Hypertension     Macular degeneration     Dr Razo for injection, Jennifer for eye     S/P CABG x 2     Type 2 diabetes mellitus with circulatory disorder     Dr. Aquino         Past Surgical History:   Procedure Laterality Date    broken elbow      left    COLONOSCOPY N/A 10/4/2017    Procedure: COLONOSCOPY;  Surgeon: Gabriel Leung MD;  Location: [facility];  Service: Endoscopy;  Laterality: N/A;    CRANIOTOMY FOR EVACUATION OF SUBDURAL HEMATOMA Right 10/18/2024    Procedure: CRANIOTOMY, FOR SUBDURAL HEMATOMA EVACUATION;  Surgeon: Laurence Condon MD;  Location: [facility] OR [address] [place] [address];  Service: Neurosurgery;  Laterality: Right;    CRANIOTOMY FOR EVACUATION OF SUBDURAL HEMATOMA Right 10/18/2024    Procedure: CRANIOTOMY, Right supraorbital FOR HEMATOMA EVACUATION;  Surgeon: Laurence Condon MD;  Location: [facility] OR [address] [place] [address];  Service: Neurosurgery;  Laterality: Right;    ESOPHAGOGASTRODUODENOSCOPY W/ PEG N/A 10/31/2024    Procedure: EGD, WITH PEG TUBE INSERTION;  Surgeon: Rosemary Garcia MD;  Location: [address];  Service: General;  Laterality: N/A;    EYE SURGERY      cataract    heart bypass      2004    HERNIA REPAIR      right    ROTATOR CUFF REPAIR      right  2004    TRACHEOSTOMY N/A 10/31/2024    Procedure: CREATION, TRACHEOSTOMY;  Surgeon: Rosemary Garcia MD;  Location: [facility] OR [address] [place] [address];  Service: General;  Laterality: N/A;       Time Tracking:     OT Date of Treatment: 11/05/24  OT Start Time: 0400  OT Stop Time: 0420  OT Total Time (min): 20 min    Billable Minutes:Evaluation 10  Neuromuscular Re-education 10    11/5/2024

## 2024-11-06 LAB
ALBUMIN SERPL BCP-MCNC: 2 G/DL (ref 3.5–5.2)
ALLENS TEST: ABNORMAL
ALP SERPL-CCNC: 99 U/L (ref 40–150)
ALT SERPL W/O P-5'-P-CCNC: 12 U/L (ref 10–44)
ANION GAP SERPL CALC-SCNC: 11 MMOL/L (ref 8–16)
AST SERPL-CCNC: 23 U/L (ref 10–40)
BASOPHILS # BLD AUTO: 0.03 K/UL (ref 0–0.2)
BASOPHILS NFR BLD: 0.4 % (ref 0–1.9)
BILIRUB SERPL-MCNC: 0.4 MG/DL (ref 0.1–1)
BUN SERPL-MCNC: 54 MG/DL (ref 8–23)
CALCIUM SERPL-MCNC: 8.4 MG/DL (ref 8.7–10.5)
CHLORIDE SERPL-SCNC: 104 MMOL/L (ref 95–110)
CO2 SERPL-SCNC: 27 MMOL/L (ref 23–29)
CREAT SERPL-MCNC: 1.5 MG/DL (ref 0.5–1.4)
DELSYS: ABNORMAL
DIFFERENTIAL METHOD BLD: ABNORMAL
EOSINOPHIL # BLD AUTO: 0.5 K/UL (ref 0–0.5)
EOSINOPHIL NFR BLD: 6.1 % (ref 0–8)
ERYTHROCYTE [DISTWIDTH] IN BLOOD BY AUTOMATED COUNT: 17.5 % (ref 11.5–14.5)
EST. GFR  (NO RACE VARIABLE): 46.2 ML/MIN/1.73 M^2
FIO2: 40
GLUCOSE SERPL-MCNC: 154 MG/DL (ref 70–110)
HCO3 UR-SCNC: 30.3 MMOL/L (ref 24–28)
HCT VFR BLD AUTO: 26 % (ref 40–54)
HCT VFR BLD CALC: 22 %PCV (ref 36–54)
HGB BLD-MCNC: 7.8 G/DL (ref 14–18)
IMM GRANULOCYTES # BLD AUTO: 0.09 K/UL (ref 0–0.04)
IMM GRANULOCYTES NFR BLD AUTO: 1.1 % (ref 0–0.5)
LYMPHOCYTES # BLD AUTO: 0.8 K/UL (ref 1–4.8)
LYMPHOCYTES NFR BLD: 9.2 % (ref 18–48)
MAGNESIUM SERPL-MCNC: 2.7 MG/DL (ref 1.6–2.6)
MCH RBC QN AUTO: 27.9 PG (ref 27–31)
MCHC RBC AUTO-ENTMCNC: 30 G/DL (ref 32–36)
MCV RBC AUTO: 93 FL (ref 82–98)
MODE: ABNORMAL
MONOCYTES # BLD AUTO: 0.8 K/UL (ref 0.3–1)
MONOCYTES NFR BLD: 10.3 % (ref 4–15)
NEUTROPHILS # BLD AUTO: 6 K/UL (ref 1.8–7.7)
NEUTROPHILS NFR BLD: 72.9 % (ref 38–73)
NRBC BLD-RTO: 0 /100 WBC
OHS QRS DURATION: 110 MS
OHS QTC CALCULATION: 414 MS
PCO2 BLDA: 42.8 MMHG (ref 35–45)
PEEP: 5
PH SMN: 7.46 [PH] (ref 7.35–7.45)
PHOSPHATE SERPL-MCNC: 3.8 MG/DL (ref 2.7–4.5)
PLATELET # BLD AUTO: 287 K/UL (ref 150–450)
PMV BLD AUTO: 10 FL (ref 9.2–12.9)
PO2 BLDA: 110 MMHG (ref 80–100)
POC BE: 6 MMOL/L
POC IONIZED CALCIUM: 1.15 MMOL/L (ref 1.06–1.42)
POC SATURATED O2: 99 % (ref 95–100)
POC TCO2: 32 MMOL/L (ref 23–27)
POCT GLUCOSE: 132 MG/DL (ref 70–110)
POCT GLUCOSE: 153 MG/DL (ref 70–110)
POCT GLUCOSE: 161 MG/DL (ref 70–110)
POCT GLUCOSE: 163 MG/DL (ref 70–110)
POCT GLUCOSE: 172 MG/DL (ref 70–110)
POCT GLUCOSE: 199 MG/DL (ref 70–110)
POCT GLUCOSE: 213 MG/DL (ref 70–110)
POTASSIUM BLD-SCNC: 4.4 MMOL/L (ref 3.5–5.1)
POTASSIUM SERPL-SCNC: 4.6 MMOL/L (ref 3.5–5.1)
PROT SERPL-MCNC: 6.2 G/DL (ref 6–8.4)
PS: 5
RBC # BLD AUTO: 2.8 M/UL (ref 4.6–6.2)
SAMPLE: ABNORMAL
SITE: ABNORMAL
SODIUM BLD-SCNC: 140 MMOL/L (ref 136–145)
SODIUM SERPL-SCNC: 142 MMOL/L (ref 136–145)
SP02: 98
SPONT RATE: 17
WBC # BLD AUTO: 8.18 K/UL (ref 3.9–12.7)

## 2024-11-06 PROCEDURE — 63600175 PHARM REV CODE 636 W HCPCS: Mod: JZ,JG

## 2024-11-06 PROCEDURE — 85025 COMPLETE CBC W/AUTO DIFF WBC: CPT

## 2024-11-06 PROCEDURE — 94761 N-INVAS EAR/PLS OXIMETRY MLT: CPT

## 2024-11-06 PROCEDURE — 80053 COMPREHEN METABOLIC PANEL: CPT

## 2024-11-06 PROCEDURE — 94668 MNPJ CHEST WALL SBSQ: CPT

## 2024-11-06 PROCEDURE — 93010 ELECTROCARDIOGRAM REPORT: CPT | Mod: ,,, | Performed by: INTERNAL MEDICINE

## 2024-11-06 PROCEDURE — 82803 BLOOD GASES ANY COMBINATION: CPT

## 2024-11-06 PROCEDURE — 99233 SBSQ HOSP IP/OBS HIGH 50: CPT | Mod: GC,,, | Performed by: PSYCHIATRY & NEUROLOGY

## 2024-11-06 PROCEDURE — 25000003 PHARM REV CODE 250: Performed by: INTERNAL MEDICINE

## 2024-11-06 PROCEDURE — 93005 ELECTROCARDIOGRAM TRACING: CPT

## 2024-11-06 PROCEDURE — 27100171 HC OXYGEN HIGH FLOW UP TO 24 HOURS

## 2024-11-06 PROCEDURE — 94003 VENT MGMT INPAT SUBQ DAY: CPT

## 2024-11-06 PROCEDURE — 25000242 PHARM REV CODE 250 ALT 637 W/ HCPCS

## 2024-11-06 PROCEDURE — 99900026 HC AIRWAY MAINTENANCE (STAT)

## 2024-11-06 PROCEDURE — 97112 NEUROMUSCULAR REEDUCATION: CPT

## 2024-11-06 PROCEDURE — 25000003 PHARM REV CODE 250

## 2024-11-06 PROCEDURE — 83735 ASSAY OF MAGNESIUM: CPT

## 2024-11-06 PROCEDURE — 99900035 HC TECH TIME PER 15 MIN (STAT)

## 2024-11-06 PROCEDURE — 84100 ASSAY OF PHOSPHORUS: CPT

## 2024-11-06 PROCEDURE — 94640 AIRWAY INHALATION TREATMENT: CPT

## 2024-11-06 PROCEDURE — 36600 WITHDRAWAL OF ARTERIAL BLOOD: CPT

## 2024-11-06 PROCEDURE — 51798 US URINE CAPACITY MEASURE: CPT

## 2024-11-06 PROCEDURE — 20000000 HC ICU ROOM

## 2024-11-06 PROCEDURE — 27200966 HC CLOSED SUCTION SYSTEM

## 2024-11-06 PROCEDURE — 63600175 PHARM REV CODE 636 W HCPCS

## 2024-11-06 RX ADMIN — INSULIN ASPART 1 UNITS: 100 INJECTION, SOLUTION INTRAVENOUS; SUBCUTANEOUS at 09:11

## 2024-11-06 RX ADMIN — INSULIN ASPART 2 UNITS: 100 INJECTION, SOLUTION INTRAVENOUS; SUBCUTANEOUS at 08:11

## 2024-11-06 RX ADMIN — INSULIN ASPART 9 UNITS: 100 INJECTION, SOLUTION INTRAVENOUS; SUBCUTANEOUS at 12:11

## 2024-11-06 RX ADMIN — INSULIN ASPART 9 UNITS: 100 INJECTION, SOLUTION INTRAVENOUS; SUBCUTANEOUS at 08:11

## 2024-11-06 RX ADMIN — LACOSAMIDE 200 MG: 50 TABLET, FILM COATED ORAL at 09:11

## 2024-11-06 RX ADMIN — INSULIN ASPART 9 UNITS: 100 INJECTION, SOLUTION INTRAVENOUS; SUBCUTANEOUS at 03:11

## 2024-11-06 RX ADMIN — INSULIN ASPART 9 UNITS: 100 INJECTION, SOLUTION INTRAVENOUS; SUBCUTANEOUS at 04:11

## 2024-11-06 RX ADMIN — SENNOSIDES AND DOCUSATE SODIUM 2 TABLET: 50; 8.6 TABLET ORAL at 09:11

## 2024-11-06 RX ADMIN — CARVEDILOL 3.12 MG: 3.12 TABLET, FILM COATED ORAL at 08:11

## 2024-11-06 RX ADMIN — HEPARIN SODIUM 5000 UNITS: 5000 INJECTION INTRAVENOUS; SUBCUTANEOUS at 09:11

## 2024-11-06 RX ADMIN — SODIUM CHLORIDE SOLN NEBU 3% 4 ML: 3 NEBU SOLN at 07:11

## 2024-11-06 RX ADMIN — DULOXETINE HYDROCHLORIDE 60 MG: 60 CAPSULE, DELAYED RELEASE ORAL at 08:11

## 2024-11-06 RX ADMIN — INSULIN ASPART 2 UNITS: 100 INJECTION, SOLUTION INTRAVENOUS; SUBCUTANEOUS at 03:11

## 2024-11-06 RX ADMIN — INSULIN ASPART 2 UNITS: 100 INJECTION, SOLUTION INTRAVENOUS; SUBCUTANEOUS at 12:11

## 2024-11-06 RX ADMIN — HEPARIN SODIUM 5000 UNITS: 5000 INJECTION INTRAVENOUS; SUBCUTANEOUS at 02:11

## 2024-11-06 RX ADMIN — SODIUM CHLORIDE SOLN NEBU 3% 4 ML: 3 NEBU SOLN at 01:11

## 2024-11-06 RX ADMIN — SILODOSIN 8 MG: 8 CAPSULE ORAL at 08:11

## 2024-11-06 RX ADMIN — ASPIRIN 81 MG CHEWABLE TABLET 81 MG: 81 TABLET CHEWABLE at 08:11

## 2024-11-06 RX ADMIN — ATORVASTATIN CALCIUM 40 MG: 40 TABLET, FILM COATED ORAL at 08:11

## 2024-11-06 RX ADMIN — LABETALOL HYDROCHLORIDE 10 MG: 5 INJECTION, SOLUTION INTRAVENOUS at 04:11

## 2024-11-06 RX ADMIN — LEVETIRACETAM 750 MG: 500 SOLUTION ORAL at 09:11

## 2024-11-06 RX ADMIN — SODIUM CHLORIDE SOLN NEBU 3% 4 ML: 3 NEBU SOLN at 12:11

## 2024-11-06 RX ADMIN — CARVEDILOL 3.12 MG: 3.12 TABLET, FILM COATED ORAL at 09:11

## 2024-11-06 RX ADMIN — INSULIN ASPART 1 UNITS: 100 INJECTION, SOLUTION INTRAVENOUS; SUBCUTANEOUS at 12:11

## 2024-11-06 RX ADMIN — SENNOSIDES AND DOCUSATE SODIUM 2 TABLET: 50; 8.6 TABLET ORAL at 08:11

## 2024-11-06 RX ADMIN — INSULIN GLARGINE 25 UNITS: 100 INJECTION, SOLUTION SUBCUTANEOUS at 08:11

## 2024-11-06 RX ADMIN — INSULIN ASPART 9 UNITS: 100 INJECTION, SOLUTION INTRAVENOUS; SUBCUTANEOUS at 09:11

## 2024-11-06 RX ADMIN — HEPARIN SODIUM 5000 UNITS: 5000 INJECTION INTRAVENOUS; SUBCUTANEOUS at 05:11

## 2024-11-06 RX ADMIN — FAMOTIDINE 20 MG: 20 TABLET ORAL at 08:11

## 2024-11-06 NOTE — ASSESSMENT & PLAN NOTE
83M PMHx of HTN, HLD, DM2, CAD s/p CABG x2, Afib on Coumadin, ILD on 4L of O2, HFrEF (45%), presenting as a transfer for a large traumatic R frontal IPH reversed with Kcentra and vitamin K with repeat INR 1.2.    q1h neuro checks, vitals, and I&O's  CBC, CMP, mag, and phos daily   Echo, EKG, CXR  SBP <160  Na goals > 135  NSGY signed off  SCDs; heparin for VTE prophylaxis  S/p Trach/PEG, neuro exam unchanged at this time  Pt accepted at [facility], pending insurance approval.

## 2024-11-06 NOTE — PLAN OF CARE
KONRAD contacted Maris at  [facility] to get a status of insurance authorization.   Maris indicated that she us still awaiting authorization.     KONRAD will continue to monitor pt needs.     Discharge Plan A and Plan B have been determined by review of patient's clinical status, future medical and therapeutic needs, and coverage/benefits for post-acute care in coordination with multidisciplinary team members.    Haleigh Perdomo MSW, FIONAW  [facility]  Case Management Dept.

## 2024-11-06 NOTE — PROGRESS NOTES
[facility] - Neuro Critical Care  Neurocritical Care  Progress Note    Admit Date: 10/17/2024  Service Date: 11/06/2024  Length of Stay: 20    Subjective:     Chief Complaint: Traumatic right-sided intracerebral hemorrhage without loss of consciousness    History of Present Illness: Percy Ramirez is a 83M PMHx of HTN, HLD, DM2, CAD s/p CABG x2, Afib on Coumadin, ILD on 4L of O2, HFrEF (45%), presenting as a transfer for a large traumatic R frontal IPH. Pt initially presented to [facility] ED yesterday ago after mechanical fall and was found to have 4mm parafalcine SDH. Repeat CTH was stable. Pt was discharged home on keppra and instructed to hold his blood thinners. On 10/17/24 pt was lethargic and fell. He was brought back to  ED. Blood glucose was >600 and he was febrile (101F). CTH revealed 5cm R frontal IPH with 7mm MLS. Kcentra, 10mg vitamin K, and 3% HTS bolus were given. He was transferred to [facility] for higher level of care.     Hospital Course: 10/19/2024: POD #1 from both his MI LS as well as his right subdural/intraparenchymal hemorrhage evacuation.  He is still intubated and although sedation is off, he is minimally responsive. Coreg doubled. NG tube placed and tube feeds started. Still on insulin drip.  10/20/2024: POD #2 s/p crani for SDH evacuation. Leukocytosis noted post-op, no accompanying fever/chills. Straight cath x1. Tachycardia responding to IVF boluses. Tube feeds started, will titrate to goal. Insulin gtt continued, plans to transition to subQ tomorrow.  10/21/2021: POD#3. EEG placed this morning, revealing multiple r-sided seizures. Keppra load completed and maintenance dose increased. Concern for LUE no longer WD, stat head CT showing new/increased hyperdensity in resection bed w/o worsening mass effect or MLS. 1L LR given for LEANDRO. Will continue to monitor Na.   10/22/24: POD#4. Addl seizures despite keppra load. Vimpat and propofol started yesterday with significant reduction in number.  1U PRBC transfused overnight. Required levo for short period of time after initiation of propofol.  10/23/24: POD#5.  Patient continues to be hyponatremic.  Free water volume increased.  Patient with total of 11 seizures yesterday.  Vimpat and propofol dosing increased with improvement.  10/24/24: POD#6. NGT adjusted overnight. No additional seizures on current AEM regimen. Platelets and Hgb decreasing, will order peripheral smear to assess for HIT  10/25/2024 POD#7. Addl seizure, Onfi added to AEM regimen. Drains removed by NSGY. Will attempt to wean propofol tomorrow.   10/26/2024 POD#8. MRI complete yx. Weaning propofol today.   10/27/2024 POD#9. Patient began having seizures again overnight following cessation of propofol gtt. Resumed propofol at 10cc/hr for now, increased Keppra dose, and plan to attempt to wean again tomorrow. Persistent hyperglycemia on tube feeds, optimizing insulin regimen. Leukocytosis pending respiratory cultures.  10/28/2024 POD10. No addl seizures overnight. Propofol turned off this morning. Clobazam dosing decreased as well. Resp cultures negative, but will obtain blood and urine cultures. Cont abx. NSGY signing off.   10/29/2024 POD11. No seizures. Clobazam down to 10mg QHS. Infiltrate developing RLL, sputum culture likely due to colonization of resp tract per ID.   10/30/2024 POD12. No seizures. Clobazam discontinued. Abx discontinued, as no significant sputum production and resp status is stable. Discussion with family today - trach/PEG likely tomorrow.   10/31/2024 POD13. Single apneic episode yx evening with no EEG correlation. Spontaneously resolved. To OR today for trach/PEG. EEG cap to be removed.   11/1/24 POD14. Hyperkalemia overnight, resolving with lokelma and albuterol 10mg x1.   11/2/24 POD15. NAEON. Remains stable.  11/3/24 POD16. NAEON. Stable. Increased silodosin and optimized bowel regimen. Trach collar trials daily.  11/4/24 POD17: NAEON. Stable, spontaneously  voiding. Free water flushes increased to 400cc QID. Q4 neuro checks. Tolerating trach collar well during the day.   11/5/24 POD 18: NAEON. Stable, tolerating trach collar well. Bowel regimen held this morning 2/2 an episode of diarrhea.   11/6/24 POD 19: Pt became tachypneic overnight requiring use of ventilator instead of trach collar. Pt accepted to [facility], pending insurance approval.     Interval History:  Pt became tachypneic overnight requiring use of ventilator instead of trach collar. Patient accepted [facility], pending insurance approval.     Review of Systems   Unable to perform ROS: Acuity of condition       Objective:     Vitals:  Temp: 98.2 °F (36.8 °C)  Pulse: 76  Rhythm: atrial rhythm  BP: (!) 142/68  MAP (mmHg): 98  Resp: (!) 26  SpO2: 98 %  Oxygen Concentration (%): (S) 60  Vent Mode: Spont  Pressure Support: 5 cmH20  PEEP/CPAP: 5 cmH20  Peak Airway Pressure: 10 cmH20  Mean Airway Pressure: 6.6 cmH20  Plateau Pressure: 13 cmH20    Temp  Min: 97.9 °F (36.6 °C)  Max: 98.4 °F (36.9 °C)  Pulse  Min: 59  Max: 90  BP  Min: 102/57  Max: 175/89  MAP (mmHg)  Min: 76  Max: 120  Resp  Min: 11  Max: 33  SpO2  Min: 89 %  Max: 98 %  Oxygen Concentration (%)  Min: 40  Max: 60    11/05 0701 - 11/06 0700  In: 2970   Out: 1000 [Urine:1000]   Unmeasured Output  Urine Occurrence: 1  Stool Occurrence: 1  Pad Count: 3        Physical Exam  Vitals and nursing note reviewed.   Constitutional:       General: He is not in acute distress.     Appearance: He is not toxic-appearing.   Eyes:      Conjunctiva/sclera: Conjunctivae normal.      Pupils: Pupils are equal, round, and reactive to light.   Cardiovascular:      Comments: Irregularly irregular  Pulmonary:      Effort: No respiratory distress.      Comments: Trach collar, breathing spontaneously  Abdominal:      General: There is no distension.      Palpations: Abdomen is soft.      Tenderness: There is no abdominal tenderness.      Comments: PEG in place    Musculoskeletal:         General: Swelling (UE) present.   Skin:     General: Skin is dry.   Neurological:      Comments: Unarousable  PERRL  +Gag  BLE flex to pain              Medications:  [id number]    Scheduledaspirin, 81 mg, Daily  atorvastatin, 40 mg, Daily  carvediloL, 3.125 mg, BID  DULoxetine, 60 mg, Daily  famotidine, 20 mg, Daily  heparin (porcine), 5,000 Units, Q8H  insulin aspart U-100, 9 Units, 6 times per day  insulin glargine U-100, 25 Units, Daily  lacosamide, 200 mg, Q12H  levetiracetam, 750 mg, BID  senna-docusate 8.6-50 mg, 2 tablet, BID  silodosin, 8 mg, Daily  sodium chloride 3%, 4 mL, Q6H    PRNacetaminophen, 650 mg, Q6H PRN  D10W, , Continuous PRN  dextrose 10%, 12.5 g, PRN  dextrose 10%, 25 g, PRN  glucagon (human recombinant), 1 mg, PRN  hydrALAZINE, 10 mg, Q4H PRN  insulin aspart U-100, 0-10 Units, Q4H PRN  labetalol, 10 mg, Q4H PRN  ondansetron, 4 mg, Q8H PRN      Today I personally reviewed pertinent medications, lines/drains/airways, laboratory results, notably:    Diet  Diet NPO Except for: Medication  Diet NPO Except for: Medication      Assessment/Plan:     Neuro  * Traumatic right-sided intracerebral hemorrhage without loss of consciousness  83M PMHx of HTN, HLD, DM2, CAD s/p CABG x2, Afib on Coumadin, ILD on 4L of O2, HFrEF (45%), presenting as a transfer for a large traumatic R frontal IPH reversed with Kcentra and vitamin K with repeat INR 1.2.    q1h neuro checks, vitals, and I&O's  CBC, CMP, mag, and phos daily   Echo, EKG, CXR  SBP <160  Na goals > 135  NSGY signed off  SCDs; heparin for VTE prophylaxis  S/p Trach/PEG, neuro exam unchanged at this time  Pt accepted at [facility], pending insurance approval.     Focal seizures  EEG placed morning of 10/21 due to delay in return to expected LOC  -Multiple seizures throughout the day  -Has been loaded with keppra and vimpat  -Addl seizure evening of 10/24, Onfi added on.   -Current AED regimen:   - Keppra 750 BID   -  Vimpat 200 BID  -No seizures 10/31  -Keppra dose reduced to 750mg BID to renally dose; EEG cap removed    Psychiatric  Major depressive disorder, recurrent episode, mild  Continue home duloxetine.    Pulmonary  ILD (interstitial lung disease)  ILD on 4L of O2 at home    SpO2 goal > 88%  Duo nebs and tiotropium  Currently on trach collar during day  Continue chest physiotherapy and HTN nebs to help thin secretions    Cardiac/Vascular  Chronic combined systolic and diastolic heart failure  06/29/2024 echo with EF 45%, was previously 30-40%    Repeat echo shows his EF is still 45%  HDS    Persistent atrial fibrillation  In atrial fibrillation on admit  Hold Coumadin in setting of acute intraparenchymal hemorrhage  Heparin subcu for VTE prophylaxis    Hyperlipidemia LDL goal <100  Lipid panel showed low LDL, low HDL, low total cholesterol  Continue Atorvastatin    Coronary artery disease  S/p CABG x2 in 2004  Patient has reportedly not been on Plavix recently because of his Coumadin  -Continue home Aspirin and atorvastatin      Renal/  Hyperkalemia  Hyperkalemia is likely due to LEANDRO.The patients most recent potassium results are listed below.  Recent Labs     11/04/24 0053 11/05/24 0221 11/06/24  0328   K 4.4 4.2 4.6       Plan  - Monitor for arrhythmias with EKG and/or continuous telemetry.   - Treat the hyperkalemia with Potassium Binders.   - Monitor potassium: Daily  - The patient's hyperkalemia is resolved      LEANDRO (acute kidney injury)  LEANDRO on CKD, Cr 1.8 on initial presentation to ED    Recent Labs     11/04/24 0053 11/05/24 0221 11/06/24  0328   CREATININE 1.7* 1.6* 1.5*   EGFRNORACEVR 39.8* 42.8* 46.2*        Plan  - LEANDRO is resolved, increased to 1.7  - Avoid nephrotoxins and renally dose meds for GFR listed above  - Monitor urine output, serial BMP, and adjust therapy as needed  - Q1H I&Os  - Free water boluses -- 400cc QID  - Pt has required straight catheterization during ICU stay however is currently  voiding spontaneously; no need for rivers catheter placement at this time.     Oncology  Leukocytosis  Elevated WBC 13.5 today. Procal elevated at 0.82.  - Repeat procal at .74  - receiving zosyn, vanc discontinued  - Respiratory cx growing Candida lusitaniae     - ID consulted, candida likely respiratory colonizer and not needing treatment in immunocompetent patient  - No source of infection at this time - antibiotics discontinued    Other  Obstructive sleep apnea treated with BiPAP  Currently with trach on vent but tolerating trach collar and breathing spontaneously during the day.  Daily ABG  No significant abnormalities on today's ABG            The patient is being Prophylaxed for:  Venous Thromboembolism with: Mechanical or Chemical  Stress Ulcer with: H2B  Ventilator Pneumonia with: chlorhexidine oral care    Activity Orders            Elevate HOB starting at 10/31 1445    Diet NPO Except for: Medication: NPO starting at 10/31 0001    Turn patient starting at 10/18 0000          Full Code    Maricruz Vasquez DO  Neurocritical Care  [facility] - Neuro Critical Care

## 2024-11-06 NOTE — PLAN OF CARE
"[facility] Care Plan    POC reviewed with Percy Ramirez and family at 0300. Patient unable to verbalize understanding. Questions and concerns addressed. No acute events today. Pt progressing toward goals. Will continue to monitor. See below and flowsheets for full assessment and VS info.     -Q4 neuros  -Labetalol PRN x2 for SBP >160  -Bath given  -Tube feeds at goal rate    Is this a stroke patient? yes- Stroke booklet reviewed with patient, risk factors identified for patient and stroke booklet remains at bedside for ongoing education.     Care individualization: Sleep and rest promoted; care clustered.    Neuro:  Federico Coma Scale  Best Eye Response: 1-->(E1) none  Best Motor Response: 4-->(M4) withdraws from pain  Best Verbal Response: 1-->(V1) none  Federico Coma Scale Score: 6  Assessment Qualifiers: no eye obstruction present  Pupil PERRLA: yes     24 hr Temp:  [97.9 °F (36.6 °C)-98.4 °F (36.9 °C)]     CV:   Rhythm: atrial rhythm  BP goals:   SBP < 160  MAP > 65    Resp:      Vent Mode: Spont  Set Rate: 16 BPM  Oxygen Concentration (%): 40  Vt Set: 500 mL  PEEP/CPAP: 5 cmH20  Pressure Support: 5 cmH20    Plan: trach in place    GI/:     Diet/Nutrition Received: tube feeding  Last Bowel Movement: 11/05/24  Voiding Characteristics: ureteral catheter    Intake/Output Summary (Last 24 hours) at 11/6/2024 0601  Last data filed at 11/6/2024 0501  Gross per 24 hour   Intake 2910 ml   Output 600 ml   Net 2310 ml     Unmeasured Output  Urine Occurrence: 1  Stool Occurrence: 1  Pad Count: 3    Labs/Accuchecks:  Recent Labs   Lab 11/06/24  0328 11/06/24  0537   WBC 8.18  --    RBC 2.80*  --    HGB 7.8*  --    HCT 26.0* 22*     --       Recent Labs   Lab 11/06/24  0328      K 4.6   CO2 27      BUN 54*   CREATININE 1.5*   ALKPHOS 99   ALT 12   AST 23   BILITOT 0.4    No results for input(s): "PROTIME", "INR", "APTT", "HEPANTIXA" in the last 168 hours. No results for input(s): "CPK", "CPKMB", "TROPONINI", " ""MB" in the last 168 hours.    Electrolytes: No replacement orders  Accuchecks: Q4H    Gtts:   D10W   Intravenous Continuous PRN           LDA/Wounds:    Mykel Risk Assessment  Sensory Perception: 1-->completely limited  Moisture: 3-->occasionally moist  Activity: 1-->bedfast  Mobility: 1-->completely immobile  Nutrition: 3-->adequate  Friction and Shear: 2-->potential problem  Mykel Score: 11  Is your mykel score 12 or less? yes enrolled in the [facility] program, mykel mobility score is 2 or less, they are on a immerse bed, if their moisture score is 2 or less, they do not have a sacral mepilex and instead have zinc barrier cream, and heel boots on    Nurses Note -- 4 Eyes    Is there altered skin present?  No  Second RN/Staff Member:  RADHA Parsons  "

## 2024-11-06 NOTE — ASSESSMENT & PLAN NOTE
LEANDRO on CKD, Cr 1.8 on initial presentation to ED    Recent Labs     11/04/24  0053 11/05/24  0221 11/06/24  0328   CREATININE 1.7* 1.6* 1.5*   EGFRNORACEVR 39.8* 42.8* 46.2*        Plan  - LEANDRO is resolved, increased to 1.7  - Avoid nephrotoxins and renally dose meds for GFR listed above  - Monitor urine output, serial BMP, and adjust therapy as needed  - Q1H I&Os  - Free water boluses -- 400cc QID  - Pt has required straight catheterization during ICU stay however is currently voiding spontaneously; no need for rivers catheter placement at this time.

## 2024-11-06 NOTE — ASSESSMENT & PLAN NOTE
Hyperkalemia is likely due to LEANDRO.The patients most recent potassium results are listed below.  Recent Labs     11/04/24  0053 11/05/24  0221 11/06/24  0328   K 4.4 4.2 4.6       Plan  - Monitor for arrhythmias with EKG and/or continuous telemetry.   - Treat the hyperkalemia with Potassium Binders.   - Monitor potassium: Daily  - The patient's hyperkalemia is resolved

## 2024-11-06 NOTE — PT/OT/SLP PROGRESS
Occupational Therapy   Treatment    Name: Percy Ramirez  MRN: [id number]  Admitting Diagnosis:  Traumatic right-sided intracerebral hemorrhage without loss of consciousness  6 Days Post-Op    Recommendations:     Discharge Recommendations: Moderate Intensity Therapy  Discharge Equipment Recommendations:  lift device, hospital bed  Barriers to discharge:  None    Assessment:     Percy Ramirez is a 84 y.o. male with a medical diagnosis of Traumatic right-sided intracerebral hemorrhage without loss of consciousness.  He presents with performance deficits affecting function are impaired endurance, impaired balance, decreased upper extremity function, decreased lower extremity function, impaired sensation, impaired functional mobility, decreased coordination, impaired cognition, impaired self care skills, abnormal tone, decreased ROM, impaired coordination, impaired fine motor, decreased safety awareness, visual deficits.     Rehab Prognosis:  Fair; patient would benefit from acute skilled OT services to address these deficits and reach maximum level of function.       Plan:     Patient to be seen 3 x/week to address the above listed problems via neuromuscular re-education, therapeutic exercises, therapeutic activities, self-care/home management, cognitive retraining  Plan of Care Expires: 12/03/24  Plan of Care Reviewed with: patient    Subjective   Patient:  Nonverbal  Pain/Comfort:  Pain Rating 1: 0/10  Pain Rating Post-Intervention 1: 0/10    Objective:     Communicated with: Nurse prior to session.  Patient found right sidelying with bed alarm, blood pressure cuff, pulse ox (continuous), restraints, SCD, telemetry, peripheral IV, pressure relief boots, PureWick, Tracheostomy, PEG Tube upon OT entry to room.    General Precautions: Standard, aspiration, fall, NPO    Orthopedic Precautions:N/A  Braces: N/A  Respiratory Status:  trach     Occupational Performance:     Bed Mobility:    Patient completed Rolling/Turning  to Left with  dependent  Patient completed Rolling/Turning to Right with dependent     Functional Mobility/Transfers:  Dependent drawsheet transfer to lift up in bed    Activities of Daily Living:  Feeding:  NPO    Grooming: dependence while supine    [facility] 6 Click ADL: 6    Treatment & Education:  Daily orientation provided.    PROM performed bilateral UE/LEs one set x 10 rep in all planes of motion with stretches provided at end range; sustained stretch provided for ankle dorsiflexion.  Assistance and facilitation provided for upward rotation of the scapula during shoulder flexion and abduction to promote orientation of glenoid fossa of scapula to humeral head for prevention of shoulder pain.  Patient kept eyes open throughout session; no  tracking; dysconjugate gaze.   Patient unable to follow commands.   Provided multi-sensory stimulation to prevent sensory deprivation and improve clinical outcomes.  Moderate edema noted left UE; positioning provided in elevation.  Positioning provided for midline orientation with bilateral UEs elevated and heels lifted off mattress.   Family not present during the session.     Patient left supine with all lines intact, call button in reach, and bed alarm on    GOALS:   Multidisciplinary Problems       Occupational Therapy Goals          Problem: Occupational Therapy    Goal Priority Disciplines Outcome Interventions   Occupational Therapy Goal     OT, PT/OT Progressing    Description: Goals set 11/5 with an expiration date, 12/3:  Patient will increase functional independence with ADLs by performing:    Patient will demonstrate rolling to the right with max assist.  Not met   Patient will demonstrate rolling to the left with max assist.   Not met  Patient will demonstrate supine -sit with max assist.   Not met  Patient will demonstrate squat pivot transfers with max assist.   Not met  Patient will demonstrate grooming while seated with max assist.   Not met  Patient will  demonstrate upper body dressing with max assist while seated EOB.   Not met  Patient will demonstrate lower body dressing with max assist while seated EOB.   Not met  Patient will demonstrate toileting with max assist.   Not met  Patient will demonstrate bathing while seated EOB with max assist.   Not met  Patient's family / caregiver will demonstrate independence and safety with assisting patient with self-care skills and functional mobility.     Not met  Patient's family / caregiver will demonstrate independence with providing ROM and changes in bed positioning.   Not met  Patient and/or patient's family will verbalize understanding of stroke prevention guidelines, personal risk factors and stroke warning signs via teachback method.  Not met                                Time Tracking:     OT Date of Treatment: 11/06/24  OT Start Time: 0453  OT Stop Time: 0503  OT Total Time (min): 10 min    Billable Minutes:Neuromuscular Re-education 10    OT/MARIO: OT          11/6/2024

## 2024-11-06 NOTE — SUBJECTIVE & OBJECTIVE
Interval History:  Pt became tachypneic overnight requiring use of ventilator instead of trach collar. Patient accepted [facility], pending insurance approval.     Review of Systems   Unable to perform ROS: Acuity of condition       Objective:     Vitals:  Temp: 98.2 °F (36.8 °C)  Pulse: 76  Rhythm: atrial rhythm  BP: (!) 142/68  MAP (mmHg): 98  Resp: (!) 26  SpO2: 98 %  Oxygen Concentration (%): (S) 60  Vent Mode: Spont  Pressure Support: 5 cmH20  PEEP/CPAP: 5 cmH20  Peak Airway Pressure: 10 cmH20  Mean Airway Pressure: 6.6 cmH20  Plateau Pressure: 13 cmH20    Temp  Min: 97.9 °F (36.6 °C)  Max: 98.4 °F (36.9 °C)  Pulse  Min: 59  Max: 90  BP  Min: 102/57  Max: 175/89  MAP (mmHg)  Min: 76  Max: 120  Resp  Min: 11  Max: 33  SpO2  Min: 89 %  Max: 98 %  Oxygen Concentration (%)  Min: 40  Max: 60    11/05 0701 - 11/06 0700  In: 2970   Out: 1000 [Urine:1000]   Unmeasured Output  Urine Occurrence: 1  Stool Occurrence: 1  Pad Count: 3        Physical Exam  Vitals and nursing note reviewed.   Constitutional:       General: He is not in acute distress.     Appearance: He is not toxic-appearing.   Eyes:      Conjunctiva/sclera: Conjunctivae normal.      Pupils: Pupils are equal, round, and reactive to light.   Cardiovascular:      Comments: Irregularly irregular  Pulmonary:      Effort: No respiratory distress.      Comments: Trach collar, breathing spontaneously  Abdominal:      General: There is no distension.      Palpations: Abdomen is soft.      Tenderness: There is no abdominal tenderness.      Comments: PEG in place   Musculoskeletal:         General: Swelling (UE) present.   Skin:     General: Skin is dry.   Neurological:      Comments: Unarousable  PERRL  +Gag  BLE flex to pain              Medications:  [id number]    Scheduledaspirin, 81 mg, Daily  atorvastatin, 40 mg, Daily  carvediloL, 3.125 mg, BID  DULoxetine, 60 mg, Daily  famotidine, 20 mg, Daily  heparin (porcine), 5,000 Units, Q8H  insulin aspart  U-100, 9 Units, 6 times per day  insulin glargine U-100, 25 Units, Daily  lacosamide, 200 mg, Q12H  levetiracetam, 750 mg, BID  senna-docusate 8.6-50 mg, 2 tablet, BID  silodosin, 8 mg, Daily  sodium chloride 3%, 4 mL, Q6H    PRNacetaminophen, 650 mg, Q6H PRN  D10W, , Continuous PRN  dextrose 10%, 12.5 g, PRN  dextrose 10%, 25 g, PRN  glucagon (human recombinant), 1 mg, PRN  hydrALAZINE, 10 mg, Q4H PRN  insulin aspart U-100, 0-10 Units, Q4H PRN  labetalol, 10 mg, Q4H PRN  ondansetron, 4 mg, Q8H PRN      Today I personally reviewed pertinent medications, lines/drains/airways, laboratory results, notably:    Diet  Diet NPO Except for: Medication  Diet NPO Except for: Medication

## 2024-11-07 ENCOUNTER — TELEPHONE (OUTPATIENT)
Dept: NEUROSURGERY | Facility: CLINIC | Age: 84
End: 2024-11-07
Payer: MEDICARE

## 2024-11-07 LAB
ALBUMIN SERPL BCP-MCNC: 2.1 G/DL (ref 3.5–5.2)
ALLENS TEST: ABNORMAL
ALP SERPL-CCNC: 97 U/L (ref 40–150)
ALT SERPL W/O P-5'-P-CCNC: 15 U/L (ref 10–44)
ANION GAP SERPL CALC-SCNC: 10 MMOL/L (ref 8–16)
AST SERPL-CCNC: 22 U/L (ref 10–40)
BASOPHILS # BLD AUTO: 0.02 K/UL (ref 0–0.2)
BASOPHILS NFR BLD: 0.2 % (ref 0–1.9)
BILIRUB SERPL-MCNC: 0.4 MG/DL (ref 0.1–1)
BUN SERPL-MCNC: 51 MG/DL (ref 8–23)
CALCIUM SERPL-MCNC: 8.7 MG/DL (ref 8.7–10.5)
CHLORIDE SERPL-SCNC: 105 MMOL/L (ref 95–110)
CO2 SERPL-SCNC: 26 MMOL/L (ref 23–29)
CREAT SERPL-MCNC: 1.6 MG/DL (ref 0.5–1.4)
DELSYS: ABNORMAL
DIFFERENTIAL METHOD BLD: ABNORMAL
EOSINOPHIL # BLD AUTO: 0.5 K/UL (ref 0–0.5)
EOSINOPHIL NFR BLD: 5.4 % (ref 0–8)
ERYTHROCYTE [DISTWIDTH] IN BLOOD BY AUTOMATED COUNT: 18.2 % (ref 11.5–14.5)
EST. GFR  (NO RACE VARIABLE): 42.8 ML/MIN/1.73 M^2
FIO2: 60
FLOW: 10
GLUCOSE SERPL-MCNC: 132 MG/DL (ref 70–110)
HCO3 UR-SCNC: 31.1 MMOL/L (ref 24–28)
HCT VFR BLD AUTO: 28.7 % (ref 40–54)
HGB BLD-MCNC: 8.4 G/DL (ref 14–18)
IMM GRANULOCYTES # BLD AUTO: 0.1 K/UL (ref 0–0.04)
IMM GRANULOCYTES NFR BLD AUTO: 1 % (ref 0–0.5)
LYMPHOCYTES # BLD AUTO: 0.6 K/UL (ref 1–4.8)
LYMPHOCYTES NFR BLD: 6.5 % (ref 18–48)
MAGNESIUM SERPL-MCNC: 2.6 MG/DL (ref 1.6–2.6)
MCH RBC QN AUTO: 27.9 PG (ref 27–31)
MCHC RBC AUTO-ENTMCNC: 29.3 G/DL (ref 32–36)
MCV RBC AUTO: 95 FL (ref 82–98)
MODE: ABNORMAL
MONOCYTES # BLD AUTO: 1.1 K/UL (ref 0.3–1)
MONOCYTES NFR BLD: 10.8 % (ref 4–15)
NEUTROPHILS # BLD AUTO: 7.4 K/UL (ref 1.8–7.7)
NEUTROPHILS NFR BLD: 76.1 % (ref 38–73)
NRBC BLD-RTO: 0 /100 WBC
OHS QRS DURATION: 110 MS
OHS QRS DURATION: 112 MS
OHS QTC CALCULATION: 406 MS
OHS QTC CALCULATION: 406 MS
PCO2 BLDA: 43.9 MMHG (ref 35–45)
PH SMN: 7.46 [PH] (ref 7.35–7.45)
PHOSPHATE SERPL-MCNC: 3.7 MG/DL (ref 2.7–4.5)
PLATELET # BLD AUTO: 308 K/UL (ref 150–450)
PMV BLD AUTO: 10 FL (ref 9.2–12.9)
PO2 BLDA: 68 MMHG (ref 80–100)
POC BE: 7 MMOL/L
POC SATURATED O2: 94 % (ref 95–100)
POC TCO2: 32 MMOL/L (ref 23–27)
POCT GLUCOSE: 134 MG/DL (ref 70–110)
POCT GLUCOSE: 150 MG/DL (ref 70–110)
POCT GLUCOSE: 151 MG/DL (ref 70–110)
POCT GLUCOSE: 159 MG/DL (ref 70–110)
POCT GLUCOSE: 165 MG/DL (ref 70–110)
POCT GLUCOSE: 197 MG/DL (ref 70–110)
POTASSIUM SERPL-SCNC: 4.8 MMOL/L (ref 3.5–5.1)
PROT SERPL-MCNC: 6.5 G/DL (ref 6–8.4)
RBC # BLD AUTO: 3.01 M/UL (ref 4.6–6.2)
SAMPLE: ABNORMAL
SITE: ABNORMAL
SODIUM SERPL-SCNC: 141 MMOL/L (ref 136–145)
WBC # BLD AUTO: 9.71 K/UL (ref 3.9–12.7)

## 2024-11-07 PROCEDURE — 85025 COMPLETE CBC W/AUTO DIFF WBC: CPT

## 2024-11-07 PROCEDURE — 25000003 PHARM REV CODE 250

## 2024-11-07 PROCEDURE — 20000000 HC ICU ROOM

## 2024-11-07 PROCEDURE — 93005 ELECTROCARDIOGRAM TRACING: CPT

## 2024-11-07 PROCEDURE — 99900026 HC AIRWAY MAINTENANCE (STAT)

## 2024-11-07 PROCEDURE — 84100 ASSAY OF PHOSPHORUS: CPT

## 2024-11-07 PROCEDURE — 63600175 PHARM REV CODE 636 W HCPCS

## 2024-11-07 PROCEDURE — 27000221 HC OXYGEN, UP TO 24 HOURS

## 2024-11-07 PROCEDURE — 99900035 HC TECH TIME PER 15 MIN (STAT)

## 2024-11-07 PROCEDURE — 94640 AIRWAY INHALATION TREATMENT: CPT

## 2024-11-07 PROCEDURE — 80053 COMPREHEN METABOLIC PANEL: CPT

## 2024-11-07 PROCEDURE — 25000242 PHARM REV CODE 250 ALT 637 W/ HCPCS

## 2024-11-07 PROCEDURE — 25000003 PHARM REV CODE 250: Performed by: INTERNAL MEDICINE

## 2024-11-07 PROCEDURE — 94761 N-INVAS EAR/PLS OXIMETRY MLT: CPT

## 2024-11-07 PROCEDURE — 94668 MNPJ CHEST WALL SBSQ: CPT

## 2024-11-07 PROCEDURE — 63600175 PHARM REV CODE 636 W HCPCS: Mod: JZ,JG

## 2024-11-07 PROCEDURE — 36600 WITHDRAWAL OF ARTERIAL BLOOD: CPT

## 2024-11-07 PROCEDURE — 82803 BLOOD GASES ANY COMBINATION: CPT

## 2024-11-07 PROCEDURE — 83735 ASSAY OF MAGNESIUM: CPT

## 2024-11-07 PROCEDURE — 27200966 HC CLOSED SUCTION SYSTEM

## 2024-11-07 PROCEDURE — 99233 SBSQ HOSP IP/OBS HIGH 50: CPT | Mod: GC,,, | Performed by: PSYCHIATRY & NEUROLOGY

## 2024-11-07 PROCEDURE — 99024 POSTOP FOLLOW-UP VISIT: CPT | Mod: ,,, | Performed by: PHYSICIAN ASSISTANT

## 2024-11-07 PROCEDURE — 93010 ELECTROCARDIOGRAM REPORT: CPT | Mod: ,,, | Performed by: INTERNAL MEDICINE

## 2024-11-07 RX ADMIN — INSULIN ASPART 9 UNITS: 100 INJECTION, SOLUTION INTRAVENOUS; SUBCUTANEOUS at 08:11

## 2024-11-07 RX ADMIN — SODIUM CHLORIDE SOLN NEBU 3% 4 ML: 3 NEBU SOLN at 07:11

## 2024-11-07 RX ADMIN — LEVETIRACETAM 750 MG: 500 SOLUTION ORAL at 08:11

## 2024-11-07 RX ADMIN — INSULIN ASPART 9 UNITS: 100 INJECTION, SOLUTION INTRAVENOUS; SUBCUTANEOUS at 12:11

## 2024-11-07 RX ADMIN — ATORVASTATIN CALCIUM 40 MG: 40 TABLET, FILM COATED ORAL at 08:11

## 2024-11-07 RX ADMIN — HEPARIN SODIUM 5000 UNITS: 5000 INJECTION INTRAVENOUS; SUBCUTANEOUS at 10:11

## 2024-11-07 RX ADMIN — SODIUM CHLORIDE SOLN NEBU 3% 4 ML: 3 NEBU SOLN at 12:11

## 2024-11-07 RX ADMIN — HEPARIN SODIUM 5000 UNITS: 5000 INJECTION INTRAVENOUS; SUBCUTANEOUS at 01:11

## 2024-11-07 RX ADMIN — FAMOTIDINE 20 MG: 20 TABLET ORAL at 08:11

## 2024-11-07 RX ADMIN — SENNOSIDES AND DOCUSATE SODIUM 2 TABLET: 50; 8.6 TABLET ORAL at 08:11

## 2024-11-07 RX ADMIN — INSULIN ASPART 9 UNITS: 100 INJECTION, SOLUTION INTRAVENOUS; SUBCUTANEOUS at 07:11

## 2024-11-07 RX ADMIN — INSULIN ASPART 9 UNITS: 100 INJECTION, SOLUTION INTRAVENOUS; SUBCUTANEOUS at 04:11

## 2024-11-07 RX ADMIN — INSULIN ASPART 2 UNITS: 100 INJECTION, SOLUTION INTRAVENOUS; SUBCUTANEOUS at 03:11

## 2024-11-07 RX ADMIN — SODIUM CHLORIDE 250 ML: 0.9 INJECTION, SOLUTION INTRAVENOUS at 10:11

## 2024-11-07 RX ADMIN — INSULIN ASPART 1 UNITS: 100 INJECTION, SOLUTION INTRAVENOUS; SUBCUTANEOUS at 01:11

## 2024-11-07 RX ADMIN — SILODOSIN 8 MG: 8 CAPSULE ORAL at 08:11

## 2024-11-07 RX ADMIN — INSULIN GLARGINE 25 UNITS: 100 INJECTION, SOLUTION SUBCUTANEOUS at 08:11

## 2024-11-07 RX ADMIN — HEPARIN SODIUM 5000 UNITS: 5000 INJECTION INTRAVENOUS; SUBCUTANEOUS at 05:11

## 2024-11-07 RX ADMIN — INSULIN ASPART 2 UNITS: 100 INJECTION, SOLUTION INTRAVENOUS; SUBCUTANEOUS at 04:11

## 2024-11-07 RX ADMIN — DULOXETINE HYDROCHLORIDE 60 MG: 60 CAPSULE, DELAYED RELEASE ORAL at 08:11

## 2024-11-07 RX ADMIN — INSULIN ASPART 2 UNITS: 100 INJECTION, SOLUTION INTRAVENOUS; SUBCUTANEOUS at 12:11

## 2024-11-07 RX ADMIN — INSULIN ASPART 9 UNITS: 100 INJECTION, SOLUTION INTRAVENOUS; SUBCUTANEOUS at 01:11

## 2024-11-07 RX ADMIN — LACOSAMIDE 200 MG: 50 TABLET, FILM COATED ORAL at 08:11

## 2024-11-07 RX ADMIN — INSULIN ASPART 9 UNITS: 100 INJECTION, SOLUTION INTRAVENOUS; SUBCUTANEOUS at 03:11

## 2024-11-07 RX ADMIN — LABETALOL HYDROCHLORIDE 10 MG: 5 INJECTION, SOLUTION INTRAVENOUS at 09:11

## 2024-11-07 RX ADMIN — LABETALOL HYDROCHLORIDE 10 MG: 5 INJECTION, SOLUTION INTRAVENOUS at 03:11

## 2024-11-07 RX ADMIN — ASPIRIN 81 MG CHEWABLE TABLET 81 MG: 81 TABLET CHEWABLE at 08:11

## 2024-11-07 RX ADMIN — CARVEDILOL 3.12 MG: 3.12 TABLET, FILM COATED ORAL at 08:11

## 2024-11-07 NOTE — NURSING
MD Geoff notified of BP of 84/50 (MAP 63). One time order for 250 mL NS bolus at 125 mL/hr ordered. Bolus administered at this time. WCTM.

## 2024-11-07 NOTE — PROGRESS NOTES
"[facility]  Adult Nutrition  Progress Note    SUMMARY     Recommendation/Intervention:   1)  TF- Increase to Diabetisource @ 60 mL/hr due to Propofol turned off, will provide 1728 kcal, 86 g protein, 1178 mL H2O per day              - FWF per MD  2) RD to monitor and follow-up as needed    Goals: Meet % EEN/EPN by RD follow up.  Nutrition Goal Status: progressing towards goal  Communication of RD Recs: other (comment) (POC)    Assessment and Plan    Nutrition Problem  Inadequate oral intake     Related to (etiology):   Inability to consume sufficient nutrients, mechanical ventilation     Signs and Symptoms (as evidenced by):   NPO status     Interventions/Recommendations (treatment strategy):  Collab of nutrition care w/ other providers   Enteral nutrition     Nutrition Diagnosis Status:   Continues    Reason for Assessment    Reason For Assessment: RD follow-up  Diagnosis: other (see comments) (Traumatic right-sided intracerebral hemorrhage without loss of consciousness)  General Information Comments: Pt followed by RD team. Remains NPO with TF infusing via PEG at ordered rate. Per DO note, "Pt became tachypneic overnight requiring use of ventilator instead of trach collar. Patient accepted [facility], pending insurance approval".   Nutrition Discharge Planning: TF via PEG    Nutrition Risk Screen    Nutrition Risk Screen: tube feeding or parenteral nutrition    Nutrition/Diet History    Typical Food/Fluid Intake: Breakfast: yogurt, bananas; Lunch: salad, sandwich; Dinner: katlin steak, mashed potatoes  Spiritual, Cultural Beliefs, Catholic Practices, Values that Affect Care: no  Vitamin/Mineral/Herbal Supplements: Vit D  Food Allergies: NKFA  Factors Affecting Nutritional Intake: NPO    Anthropometrics    Temp: 98.6 °F (37 °C)  Height: 5' 8" (172.7 cm)  Height (inches): 68 in  Weight Method: Bed Scale  Weight: 83.5 kg (184 lb)  Weight (lb): 184 lb  Ideal Body Weight (IBW), Male: " 154 lb  % Ideal Body Weight, Male (lb): 119.48 %  BMI (Calculated): 28  BMI Grade: 25 - 29.9 - overweight  Usual Body Weight (UBW), k.82 kg  % Usual Body Weight: 102.22       Lab/Procedures/Meds    Pertinent Labs Reviewed: reviewed  Pertinent Labs Comments: Hgb: 7.8, Hct: 26, BUN: 54, Creatinine: 1.5, eGFR: 46.2, Glucose: 154, Calcium: 8.4, Magnesium: 2.7  Pertinent Medications Reviewed: reviewed   aspirin  81 mg Per G Tube Daily    atorvastatin  40 mg Per G Tube Daily    carvediloL  3.125 mg Per G Tube BID    DULoxetine  60 mg Oral Daily    famotidine  20 mg Per G Tube Daily    heparin (porcine)  5,000 Units Subcutaneous Q8H    insulin aspart U-100  9 Units Subcutaneous 6 times per day    insulin glargine U-100  25 Units Subcutaneous Daily    lacosamide  200 mg Per G Tube Q12H    levetiracetam  750 mg Per G Tube BID    senna-docusate 8.6-50 mg  2 tablet Per G Tube BID    silodosin  8 mg Per G Tube Daily    sodium chloride 3%  4 mL Nebulization Q6H     Estimated/Assessed Needs    Weight Used For Calorie Calculations: 83.5 kg (184 lb 1.4 oz)  Energy Calorie Requirements (kcal): 1720 kcal/day  Energy Need Method: [facility]  Protein Requirements:  (1.0-1.2 g/kg ABW)  Weight Used For Protein Calculations: 83.5 kg (184 lb 1.4 oz)  Fluid Requirements (mL): Per MD     RDA Method (mL): 1720  CHO Requirement: 237    Nutrition Prescription Ordered    Current Diet Order: NPO  Current Nutrition Support Formula Ordered: Diabetisource AC  Current Nutrition Support Rate Ordered: 50 (ml)  Current Nutrition Support Frequency Ordered: mL/hr    Evaluation of Received Nutrient/Fluid Intake    Enteral Calories (kcal): 1440  Enteral Protein (gm): 72  Enteral (Free Water) Fluid (mL): 982  % Kcal Needs: 84%  % Protein Needs: 86%  I/O: + 7.9 L since 10/23  Energy Calories Required: meeting needs  Protein Required: meeting needs  Fluid Required: other (see comments) (As per MD)  Comments: LBM   Tolerance: tolerating  % Intake  of Estimated Energy Needs: 75 - 100 %  % Meal Intake: NPO    Nutrition Risk    Level of Risk/Frequency of Follow-up:  (1x/week)     Monitor and Evaluation    Food and Nutrient Intake: energy intake, enteral nutrition intake  Food and Nutrient Adminstration: enteral and parenteral nutrition administration  Knowledge/Beliefs/Attitudes: food and nutrition knowledge/skill  Physical Activity and Function: nutrition-related ADLs and IADLs  Anthropometric Measurements: weight, weight change, body mass index  Biochemical Data, Medical Tests and Procedures: lipid profile, inflammatory profile, glucose/endocrine profile, gastrointestinal profile, electrolyte and renal panel  Nutrition-Focused Physical Findings: overall appearance     Nutrition Follow-Up    RD Follow-up?: Yes

## 2024-11-07 NOTE — PLAN OF CARE
"[facility] Care Plan  POC reviewed with Percy Ramirez and family at 1400. Patient and Family verbalized understanding. Questions and concerns addressed. No acute events today. Still awaiting insurance approval for LTAC Will continue to monitor. See below and flowsheets for full assessment and VS info.             Is this a stroke patient? yes- Stroke booklet reviewed with family, risk factors identified for patient and stroke booklet remains at bedside for ongoing education.     Care individualization: Pt likes blankets    Neuro:  [place] Coma Scale  Best Eye Response: 1-->(E1) none  Best Motor Response: 2-->(M2) extension to pain  Best Verbal Response: 1-->(V1) none  [place] Coma Scale Score: 4  Assessment Qualifiers: no eye obstruction present  Pupil PERRLA: yes     24 hr Temp:  [97.9 °F (36.6 °C)-98.6 °F (37 °C)]     CV:   Rhythm: atrial rhythm  BP goals:   SBP < 160  MAP > 65    Resp:      Vent Mode: Spont  Set Rate: 16 BPM  Oxygen Concentration (%): 60  Vt Set: 500 mL  PEEP/CPAP: 5 cmH20  Pressure Support: 5 cmH20    Plan: trach in place    GI/:     Diet/Nutrition Received: NPO, tube feeding  Last Bowel Movement: 11/05/24  Voiding Characteristics: external catheter    Intake/Output Summary (Last 24 hours) at 11/6/2024 1820  Last data filed at 11/6/2024 1500  Gross per 24 hour   Intake 1970 ml   Output 700 ml   Net 1270 ml     Unmeasured Output  Urine Occurrence: 1  Stool Occurrence: 0  Pad Count: 3    Labs/Accuchecks:  Recent Labs   Lab 11/06/24  0328 11/06/24  0537   WBC 8.18  --    RBC 2.80*  --    HGB 7.8*  --    HCT 26.0* 22*     --       Recent Labs   Lab 11/06/24  0328      K 4.6   CO2 27      BUN 54*   CREATININE 1.5*   ALKPHOS 99   ALT 12   AST 23   BILITOT 0.4    No results for input(s): "PROTIME", "INR", "APTT", "HEPANTIXA" in the last 168 hours. No results for input(s): "CPK", "CPKMB", "TROPONINI", "MB" in the last 168 hours.    Electrolytes: N/A - electrolytes WDL  Accuchecks: " Q6H    Gtts:   D10W   Intravenous Continuous PRN           LDA/Wounds:    Mykel Risk Assessment  Sensory Perception: 1-->completely limited  Moisture: 3-->occasionally moist  Activity: 1-->bedfast  Mobility: 1-->completely immobile  Nutrition: 3-->adequate  Friction and Shear: 2-->potential problem  Mykel Score: 11    Is your mykel score 12 or less? yes enrolled in the [facility] program, mykel mobility score is 2 or less, they are on a immerse bed, and heel boots on            Restraints:   Restraint Order  Length of Order: Order good for next 24 hours or when removed.  Date that the current order will : 10/27/24  Time that the current order will : 1623  Order Upon Application: Yes    [facility]

## 2024-11-07 NOTE — PLAN OF CARE
"[facility] Care Plan  POC reviewed with Percy Ramirez and family at 1500.  Questions and concerns addressed. No acute events today. Pt progressing toward goals. Will continue to monitor. See below and flowsheets for full assessment and VS info.     - Tolerated trach collar for entire shift  - 250 mL bolus given today for episode of hypotension  - Tube Feeds continued at goal    Is this a stroke patient? yes- Stroke booklet reviewed with family, risk factors identified for patient and stroke booklet remains at bedside for ongoing education.     Care individualization: manage secretions    Neuro:  [place] Coma Scale  Best Eye Response: 1-->(E1) none  Best Motor Response: 3-->(M3) flexion to pain  Best Verbal Response: 1-->(V1) none  [place] Coma Scale Score: 5  Assessment Qualifiers: no eye obstruction present  Pupil PERRLA: yes     24 hr Temp:  [98.2 °F (36.8 °C)-99.6 °F (37.6 °C)]     CV:   Rhythm: atrial rhythm  BP goals:   SBP < 160  MAP > 65    Resp:      Vent Mode: Spont  Set Rate: 16 BPM  Oxygen Concentration (%): 60  Vt Set: 500 mL  PEEP/CPAP: 5 cmH20  Pressure Support: 5 cmH20    Plan: trach in place    GI/:     Diet/Nutrition Received: tube feeding  Last Bowel Movement: 11/05/24  Voiding Characteristics: external catheter    Intake/Output Summary (Last 24 hours) at 11/7/2024 1507  Last data filed at 11/7/2024 1402  Gross per 24 hour   Intake 1995.42 ml   Output 500 ml   Net 1495.42 ml     Unmeasured Output  Urine Occurrence: 1  Stool Occurrence: 0  Pad Count: 2    Labs/Accuchecks:  Recent Labs   Lab 11/07/24  0319   WBC 9.71   RBC 3.01*   HGB 8.4*   HCT 28.7*         Recent Labs   Lab 11/07/24  0319      K 4.8   CO2 26      BUN 51*   CREATININE 1.6*   ALKPHOS 97   ALT 15   AST 22   BILITOT 0.4    No results for input(s): "PROTIME", "INR", "APTT", "HEPANTIXA" in the last 168 hours. No results for input(s): "CPK", "CPKMB", "TROPONINI", "MB" in the last 168 hours.    Electrolytes: N/A - " electrolytes WDL  Accuchecks: Q4H      LDA/Wounds:    Mykel Risk Assessment  Sensory Perception: 1-->completely limited  Moisture: 3-->occasionally moist  Activity: 1-->bedfast  Mobility: 1-->completely immobile  Nutrition: 3-->adequate  Friction and Shear: 2-->potential problem  Mykel Score: 11    Is your mykel score 12 or less? yes heel boots on      WCTM

## 2024-11-07 NOTE — PLAN OF CARE
Can try to see if they can get amiloride 5m g in a.m. (also a potassium sparing diuretic for bp)   Med pended    KONRAD received a call from Nhan [phone] at [facility] to set up a P2P for pt LTAC.    Nhan was provided the attending name and phone to contact today or tomorrow afternoon.  Nhan indicated that she will send the information to U.   KONRAD will continue to follow up with pt needs.       Discharge Plan A and Plan B have been determined by review of patient's clinical status, future medical and therapeutic needs, and coverage/benefits for post-acute care in coordination with multidisciplinary team members.    Haleigh Perdomo MSW, FIONAW  [facility]  Case Management Dept.

## 2024-11-07 NOTE — PROGRESS NOTES
[facility] - Neuro Critical Care  Neurocritical Care  Progress Note    Admit Date: 10/17/2024  Service Date: 11/07/2024  Length of Stay: 21    Subjective:     Chief Complaint: Traumatic right-sided intracerebral hemorrhage without loss of consciousness    History of Present Illness: Percy Ramirez is a 83M PMHx of HTN, HLD, DM2, CAD s/p CABG x2, Afib on Coumadin, ILD on 4L of O2, HFrEF (45%), presenting as a transfer for a large traumatic R frontal IPH. Pt initially presented to [facility] ED yesterday ago after mechanical fall and was found to have 4mm parafalcine SDH. Repeat CTH was stable. Pt was discharged home on keppra and instructed to hold his blood thinners. On 10/17/24 pt was lethargic and fell. He was brought back to  ED. Blood glucose was >600 and he was febrile (101F). CTH revealed 5cm R frontal IPH with 7mm MLS. Kcentra, 10mg vitamin K, and 3% HTS bolus were given. He was transferred to [facility] for higher level of care.     Hospital Course: 10/19/2024: POD #1 from both his MI LS as well as his right subdural/intraparenchymal hemorrhage evacuation.  He is still intubated and although sedation is off, he is minimally responsive. Coreg doubled. NG tube placed and tube feeds started. Still on insulin drip.  10/20/2024: POD #2 s/p crani for SDH evacuation. Leukocytosis noted post-op, no accompanying fever/chills. Straight cath x1. Tachycardia responding to IVF boluses. Tube feeds started, will titrate to goal. Insulin gtt continued, plans to transition to subQ tomorrow.  10/21/2021: POD#3. EEG placed this morning, revealing multiple r-sided seizures. Keppra load completed and maintenance dose increased. Concern for LUE no longer WD, stat head CT showing new/increased hyperdensity in resection bed w/o worsening mass effect or MLS. 1L LR given for LEANDRO. Will continue to monitor Na.   10/22/24: POD#4. Addl seizures despite keppra load. Vimpat and propofol started yesterday with significant reduction in number.  1U PRBC transfused overnight. Required levo for short period of time after initiation of propofol.  10/23/24: POD#5.  Patient continues to be hyponatremic.  Free water volume increased.  Patient with total of 11 seizures yesterday.  Vimpat and propofol dosing increased with improvement.  10/24/24: POD#6. NGT adjusted overnight. No additional seizures on current AEM regimen. Platelets and Hgb decreasing, will order peripheral smear to assess for HIT  10/25/2024 POD#7. Addl seizure, Onfi added to AEM regimen. Drains removed by NSGY. Will attempt to wean propofol tomorrow.   10/26/2024 POD#8. MRI complete yx. Weaning propofol today.   10/27/2024 POD#9. Patient began having seizures again overnight following cessation of propofol gtt. Resumed propofol at 10cc/hr for now, increased Keppra dose, and plan to attempt to wean again tomorrow. Persistent hyperglycemia on tube feeds, optimizing insulin regimen. Leukocytosis pending respiratory cultures.  10/28/2024 POD10. No addl seizures overnight. Propofol turned off this morning. Clobazam dosing decreased as well. Resp cultures negative, but will obtain blood and urine cultures. Cont abx. NSGY signing off.   10/29/2024 POD11. No seizures. Clobazam down to 10mg QHS. Infiltrate developing RLL, sputum culture likely due to colonization of resp tract per ID.   10/30/2024 POD12. No seizures. Clobazam discontinued. Abx discontinued, as no significant sputum production and resp status is stable. Discussion with family today - trach/PEG likely tomorrow.   10/31/2024 POD13. Single apneic episode yx evening with no EEG correlation. Spontaneously resolved. To OR today for trach/PEG. EEG cap to be removed.   11/1/24 POD14. Hyperkalemia overnight, resolving with lokelma and albuterol 10mg x1.   11/2/24 POD15. NAEON. Remains stable.  11/3/24 POD16. NAEON. Stable. Increased silodosin and optimized bowel regimen. Trach collar trials daily.  11/4/24 POD17: NAEON. Stable, spontaneously  voiding. Free water flushes increased to 400cc QID. Q4 neuro checks. Tolerating trach collar well during the day.   11/5/24 POD 18: NAEON. Stable, tolerating trach collar well. Bowel regimen held this morning 2/2 an episode of diarrhea.   11/6/24 POD 19: Pt became tachypneic overnight requiring use of ventilator instead of trach collar. Pt accepted to [facility], pending insurance approval.   11/7/24 POD 20: Pt had an episode of hypotension today requiring a one time 250 cc NS bolus, coreg discontinued. Insurance denying [facility] approval -- peer to peer being scheduled for either today or tomorrow.     Interval History:  Pt had an episode of hypotension today requiring a one time 250 cc NS bolus, coreg discontinued. Insurance denying [facility] approval -- peer to peer being scheduled for either today or tomorrow. Pt tolerated trach collar well all night.     Review of Systems   Unable to perform ROS: Acuity of condition       Objective:     Vitals:  Temp: 99 °F (37.2 °C)  Pulse: 66  Rhythm: atrial rhythm  BP: (!) 125/58  MAP (mmHg): 84  Resp: 15  SpO2: 97 %  Oxygen Concentration (%): 60  Vent Mode: Spont  Pressure Support: 5 cmH20  PEEP/CPAP: 5 cmH20  Peak Airway Pressure: 3 cmH20  Mean Airway Pressure: 3.7 cmH20  Plateau Pressure: 13 cmH20    Temp  Min: 98.2 °F (36.8 °C)  Max: 99.6 °F (37.6 °C)  Pulse  Min: 66  Max: 96  BP  Min: 125/58  Max: 175/77  MAP (mmHg)  Min: 84  Max: 114  Resp  Min: 13  Max: 28  SpO2  Min: 89 %  Max: 99 %  Oxygen Concentration (%)  Min: 40  Max: 60    11/06 0701 - 11/07 0700  In: 1960   Out: 700 [Urine:700]   Unmeasured Output  Urine Occurrence: 1  Stool Occurrence: 0  Pad Count: 2        Physical Exam  Vitals and nursing note reviewed.   Constitutional:       General: He is not in acute distress.     Appearance: He is not toxic-appearing.   Eyes:      Conjunctiva/sclera: Conjunctivae normal.      Pupils: Pupils are equal, round, and reactive to light.   Cardiovascular:       Comments: Irregularly irregular  Pulmonary:      Effort: No respiratory distress.      Breath sounds: Rales (mild) present.      Comments: Trach collar, breathing spontaneously  Abdominal:      General: There is no distension.      Palpations: Abdomen is soft.      Tenderness: There is no abdominal tenderness.      Comments: PEG in place   Musculoskeletal:         General: Swelling (UE) present.   Skin:     General: Skin is dry.   Neurological:      Comments: Unarousable  PERRL  +Gag (minimal)  BLE flex to pain            Medications:  [id number]    Scheduledaspirin, 81 mg, Daily  atorvastatin, 40 mg, Daily  DULoxetine, 60 mg, Daily  famotidine, 20 mg, Daily  heparin (porcine), 5,000 Units, Q8H  insulin aspart U-100, 9 Units, 6 times per day  insulin glargine U-100, 25 Units, Daily  lacosamide, 200 mg, Q12H  levetiracetam, 750 mg, BID  senna-docusate 8.6-50 mg, 2 tablet, BID  silodosin, 8 mg, Daily  sodium chloride 3%, 4 mL, Q6H    PRNacetaminophen, 650 mg, Q6H PRN  D10W, , Continuous PRN  dextrose 10%, 12.5 g, PRN  dextrose 10%, 25 g, PRN  glucagon (human recombinant), 1 mg, PRN  hydrALAZINE, 10 mg, Q4H PRN  insulin aspart U-100, 0-10 Units, Q4H PRN  labetalol, 10 mg, Q4H PRN  ondansetron, 4 mg, Q8H PRN      Today I personally reviewed pertinent medications, lines/drains/airways, laboratory results, notably:    Diet  Diet NPO Except for: Medication  Diet NPO Except for: Medication        Assessment/Plan:     Neuro  * Traumatic right-sided intracerebral hemorrhage without loss of consciousness  83M PMHx of HTN, HLD, DM2, CAD s/p CABG x2, Afib on Coumadin, ILD on 4L of O2, HFrEF (45%), presenting as a transfer for a large traumatic R frontal IPH reversed with Kcentra and vitamin K with repeat INR 1.2.    q1h neuro checks, vitals, and I&O's  CBC, CMP, mag, and phos daily   Echo, EKG, CXR  SBP <160  Na goals > 135  NSGY signed off  SCDs; heparin for VTE prophylaxis  S/p Trach/PEG, neuro exam unchanged at this  time  Pt accepted at [facility].  Insurance denied LTAC, peer to peer being schedule for today or tomorrow.     Focal seizures  EEG placed morning of 10/21 due to delay in return to expected LOC  -Multiple seizures throughout the day  -Has been loaded with keppra and vimpat  -Addl seizure evening of 10/24, Onfi added on.   -Current AED regimen:   - Keppra 750 BID   - Vimpat 200 BID  -No seizures 10/31  -Keppra dose reduced to 750mg BID to renally dose; EEG cap removed    Psychiatric  Major depressive disorder, recurrent episode, mild  Continue home duloxetine.    Pulmonary  ILD (interstitial lung disease)  ILD on 4L of O2 at home    SpO2 goal > 88%  Duo nebs and tiotropium  Currently on trach collar during day  Continue chest physiotherapy and HTN nebs to help thin secretions  CXR ordered for tomorrow AM    Cardiac/Vascular  Chronic combined systolic and diastolic heart failure  06/29/2024 echo with EF 45%, was previously 30-40%    Repeat echo shows his EF is still 45%  HDS    Persistent atrial fibrillation  In atrial fibrillation on admit  Hold Coumadin in setting of acute intraparenchymal hemorrhage  Heparin subcu for VTE prophylaxis    Hyperlipidemia LDL goal <100  Lipid panel showed low LDL, low HDL, low total cholesterol  Continue Atorvastatin    Coronary artery disease  S/p CABG x2 in 2004  Patient has reportedly not been on Plavix recently because of his Coumadin  -Continue home Aspirin and atorvastatin      Benign hypertension with chronic kidney disease, stage III  SBP goal < 160  PRN labetalol and hydralazine   Coreg dc'd due to hypotension      Renal/  Hyperkalemia  Hyperkalemia is likely due to LEANDRO.The patients most recent potassium results are listed below.  Recent Labs     11/05/24  0221 11/06/24  0328 11/07/24  0319   K 4.2 4.6 4.8       Plan  - Monitor for arrhythmias with EKG and/or continuous telemetry.   - Treat the hyperkalemia with Potassium Binders.   - Monitor potassium: Daily  - The  patient's hyperkalemia is resolved      LEANDRO (acute kidney injury)  LEANDRO on CKD, Cr 1.8 on initial presentation to ED    Recent Labs     11/05/24  0221 11/06/24  0328 11/07/24  0319   CREATININE 1.6* 1.5* 1.6*   EGFRNORACEVR 42.8* 46.2* 42.8*        Plan  - LEANDRO is resolved, increased to 1.7  - Avoid nephrotoxins and renally dose meds for GFR listed above  - Monitor urine output, serial BMP, and adjust therapy as needed  - Q1H I&Os  - Free water boluses -- 400cc QID  - Pt has required straight catheterization during ICU stay however is currently voiding spontaneously; no need for rivers catheter placement at this time.     Chronic kidney disease, stage 3a  Creatine stable for now. BMP reviewed- noted Estimated Creatinine Clearance: 37.5 mL/min (A) (based on SCr of 1.6 mg/dL (H)). according to latest data. Based on current GFR, CKD stage is stage 3 - GFR 30-59.  Monitor UOP and serial BMP and adjust therapy as needed. Renally dose meds. Avoid nephrotoxic medications and procedures.    Oncology  Leukocytosis  Elevated WBC 13.5 today. Procal elevated at 0.82.  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 Beta hydroxybutyrate and urine ketones negative. Serum CO2 22. Given SQ insulin at OSH.  [place] [facility] Hb A1c 9.9% on admit.     Resuming scheduled insulin.    Patient's FSGs are uncontrolled due to hyperglycemia on current medication regimen.  Last A1c reviewed-   Lab Results   Component Value Date    LABA1C 6.8 10/09/2017    HGBA1C 9.9 (H) 10/17/2024     Most recent fingerstick glucose reviewed-   Recent Labs   Lab 11/07/24  0114 11/07/24  5641  11/07/24  0809 11/07/24  1208   POCTGLUCOSE 197* 151* 150* 159*       Current correctional scale  Medium  Increase anti-hyperglycemic dose as follows-   Antihyperglycemics (From admission, onward)      Start     Stop Route Frequency Ordered    11/03/24 1200  insulin aspart U-100 pen 9 Units         -- SubQ 6 times per day 11/03/24 1050    11/03/24 0900  insulin glargine U-100 (Lantus) pen 25 Units         -- SubQ Daily 11/03/24 0754    10/25/24 1133  insulin aspart U-100 pen 0-10 Units         -- SubQ Every 4 hours PRN 10/25/24 1036    10/22/24 1400  insulin regular in 0.9 % NaCl 100 unit/100 mL (1 unit/mL) infusion        Question Answer Comment   Insulin Rate Adjustment (DO NOT MODIFY ANSWER) [email or web address]    Enter initial dose from Infusion Protocol Chart (Units/hr): 1.5        10/25/24 1431 IV Continuous 10/22/24 1252    10/21/24 1145  insulin regular in 0.9 % NaCl 100 unit/100 mL (1 unit/mL) infusion        Question:  Enter initial dose from Infusion Protocol Chart (Units/hr):  Answer:  1.65    10/21/24 1343 IV Continuous 10/21/24 1042          Hold Oral hypoglycemics while patient is in the hospital.      Other  Obstructive sleep apnea treated with BiPAP  Currently with trach on vent but tolerating trach collar and breathing spontaneously during the day.  Daily ABG  No significant abnormalities on today's ABG            The patient is being Prophylaxed for:  Venous Thromboembolism with: Mechanical or Chemical  Stress Ulcer with: H2B  Ventilator Pneumonia with: not applicable    Activity Orders            Elevate HOB starting at 10/31 1445    Diet NPO Except for: Medication: NPO starting at 10/31 0001    Turn patient starting at 10/18 0000          Full Code    Maricruz Vasquez DO  Neurocritical Care  [facility] - Neuro Critical Care

## 2024-11-07 NOTE — PROGRESS NOTES
Wound Check   Neurosurgery     Percy Ramirez remains inpatient s/p craniotomy for hematoma evacuation 10/18. Incisions well healed.                        Vitals:    11/07/24 1502   BP: 127/64   Pulse: 67   Resp: 14   Temp: 97.6 °F (36.4 °C)       Assessment/Plan:   - Keep incisions open to air.  - No need to continue putting bacitracin ointment on wound.  - Can get incision wet, just pat dry and no vigorous scrubbing. Do not submerge incision for another 4 weeks.   - Follow up with Dr. Condon in 4-6 weeks with [facility].    Kajal New PA-C  Neurosurgery

## 2024-11-07 NOTE — ASSESSMENT & PLAN NOTE
Initial presentation c/f HHS w glucose > 600. Beta hydroxybutyrate and urine ketones negative. Serum CO2 22. Given SQ insulin at OSH.  On arrival to [facility], . Hb A1c 9.9% on admit.     Resuming scheduled insulin.    Patient's FSGs are uncontrolled due to hyperglycemia on current medication regimen.  Last A1c reviewed-   Lab Results   Component Value Date    LABA1C 6.8 10/09/2017    HGBA1C 9.9 (H) 10/17/2024     Most recent fingerstick glucose reviewed-   Recent Labs   Lab 11/07/24  0114 11/07/24  0405 11/07/24  0809 11/07/24  1208   POCTGLUCOSE 197* 151* 150* 159*       Current correctional scale  Medium  Increase anti-hyperglycemic dose as follows-   Antihyperglycemics (From admission, onward)    Start     Stop Route Frequency Ordered    11/03/24 1200  insulin aspart U-100 pen 9 Units         -- SubQ 6 times per day 11/03/24 1050    11/03/24 0900  insulin glargine U-100 (Lantus) pen 25 Units         -- SubQ Daily 11/03/24 0754    10/25/24 1133  insulin aspart U-100 pen 0-10 Units         -- SubQ Every 4 hours PRN 10/25/24 1036    10/22/24 1400  insulin regular in 0.9 % NaCl 100 unit/100 mL (1 unit/mL) infusion        Question Answer Comment   Insulin Rate Adjustment (DO NOT MODIFY ANSWER) [email or web address]    Enter initial dose from Infusion Protocol Chart (Units/hr): 1.5        10/25/24 1431 IV Continuous 10/22/24 1252    10/21/24 1145  insulin regular in 0.9 % NaCl 100 unit/100 mL (1 unit/mL) infusion        Question:  Enter initial dose from Infusion Protocol Chart (Units/hr):  Answer:  1.65    10/21/24 1343 IV Continuous 10/21/24 1042        Hold Oral hypoglycemics while patient is in the hospital.

## 2024-11-07 NOTE — SUBJECTIVE & OBJECTIVE
Interval History:  Pt had an episode of hypotension today requiring a one time 250 cc NS bolus, coreg discontinued. Insurance denying [facility] approval -- peer to peer being scheduled for either today or tomorrow. Pt tolerated trach collar well all night.     Review of Systems   Unable to perform ROS: Acuity of condition       Objective:     Vitals:  Temp: 99 °F (37.2 °C)  Pulse: 66  Rhythm: atrial rhythm  BP: (!) 125/58  MAP (mmHg): 84  Resp: 15  SpO2: 97 %  Oxygen Concentration (%): 60  Vent Mode: Spont  Pressure Support: 5 cmH20  PEEP/CPAP: 5 cmH20  Peak Airway Pressure: 3 cmH20  Mean Airway Pressure: 3.7 cmH20  Plateau Pressure: 13 cmH20    Temp  Min: 98.2 °F (36.8 °C)  Max: 99.6 °F (37.6 °C)  Pulse  Min: 66  Max: 96  BP  Min: 125/58  Max: 175/77  MAP (mmHg)  Min: 84  Max: 114  Resp  Min: 13  Max: 28  SpO2  Min: 89 %  Max: 99 %  Oxygen Concentration (%)  Min: 40  Max: 60    11/06 0701 - 11/07 0700  In: 1960   Out: 700 [Urine:700]   Unmeasured Output  Urine Occurrence: 1  Stool Occurrence: 0  Pad Count: 2        Physical Exam  Vitals and nursing note reviewed.   Constitutional:       General: He is not in acute distress.     Appearance: He is not toxic-appearing.   Eyes:      Conjunctiva/sclera: Conjunctivae normal.      Pupils: Pupils are equal, round, and reactive to light.   Cardiovascular:      Comments: Irregularly irregular  Pulmonary:      Effort: No respiratory distress.      Breath sounds: Rales (mild) present.      Comments: Trach collar, breathing spontaneously  Abdominal:      General: There is no distension.      Palpations: Abdomen is soft.      Tenderness: There is no abdominal tenderness.      Comments: PEG in place   Musculoskeletal:         General: Swelling (UE) present.   Skin:     General: Skin is dry.   Neurological:      Comments: Unarousable  PERRL  +Gag (minimal)  BLE flex to pain            Medications:  [id number]    Scheduledaspirin, 81 mg, Daily  atorvastatin, 40 mg,  Daily  DULoxetine, 60 mg, Daily  famotidine, 20 mg, Daily  heparin (porcine), 5,000 Units, Q8H  insulin aspart U-100, 9 Units, 6 times per day  insulin glargine U-100, 25 Units, Daily  lacosamide, 200 mg, Q12H  levetiracetam, 750 mg, BID  senna-docusate 8.6-50 mg, 2 tablet, BID  silodosin, 8 mg, Daily  sodium chloride 3%, 4 mL, Q6H    PRNacetaminophen, 650 mg, Q6H PRN  D10W, , Continuous PRN  dextrose 10%, 12.5 g, PRN  dextrose 10%, 25 g, PRN  glucagon (human recombinant), 1 mg, PRN  hydrALAZINE, 10 mg, Q4H PRN  insulin aspart U-100, 0-10 Units, Q4H PRN  labetalol, 10 mg, Q4H PRN  ondansetron, 4 mg, Q8H PRN      Today I personally reviewed pertinent medications, lines/drains/airways, laboratory results, notably:    Diet  Diet NPO Except for: Medication  Diet NPO Except for: Medication

## 2024-11-07 NOTE — ASSESSMENT & PLAN NOTE
83M PMHx of HTN, HLD, DM2, CAD s/p CABG x2, Afib on Coumadin, ILD on 4L of O2, HFrEF (45%), presenting as a transfer for a large traumatic R frontal IPH reversed with Kcentra and vitamin K with repeat INR 1.2.    q1h neuro checks, vitals, and I&O's  CBC, CMP, mag, and phos daily   Echo, EKG, CXR  SBP <160  Na goals > 135  NSGY signed off  SCDs; heparin for VTE prophylaxis  S/p Trach/PEG, neuro exam unchanged at this time  Pt accepted at [facility].  Insurance denied LTAC, peer to peer being schedule for today or tomorrow.

## 2024-11-07 NOTE — ASSESSMENT & PLAN NOTE
ILD on 4L of O2 at home    SpO2 goal > 88%  Duo nebs and tiotropium  Currently on trach collar during day  Continue chest physiotherapy and HTN nebs to help thin secretions  CXR ordered for tomorrow AM

## 2024-11-07 NOTE — TELEPHONE ENCOUNTER
----- Message from Kajal New PA-C sent at 11/7/2024  3:35 PM CST -----  Can you schedule him for follow-up in 4-6 weeks with Dr. Condon or Marielle with CTH? S/p craniotomy for SDH evacuation on 10/18. Discharging to LTAC I think. CT is ordered.    Thanks!

## 2024-11-07 NOTE — ASSESSMENT & PLAN NOTE
Hyperkalemia is likely due to LEANDRO.The patients most recent potassium results are listed below.  Recent Labs     11/05/24  0221 11/06/24  0328 11/07/24  0319   K 4.2 4.6 4.8       Plan  - Monitor for arrhythmias with EKG and/or continuous telemetry.   - Treat the hyperkalemia with Potassium Binders.   - Monitor potassium: Daily  - The patient's hyperkalemia is resolved     Dizziness/Impaired gait/Weakness

## 2024-11-07 NOTE — PLAN OF CARE
[facility] - Neuro Critical Care  Discharge Reassessment    Primary Care Provider: Kasie Edmondson MD    Expected Discharge Date: 11/8/2024    Reassessment (most recent)       Discharge Reassessment - 11/07/24 1000          Discharge Reassessment    Assessment Type Discharge Planning Reassessment     Did the patient's condition or plan change since previous assessment? No     Discharge Plan discussed with: Adult children     Communicated TATI with patient/caregiver Yes     Discharge Plan A Long-term acute care facility (LTAC)     Discharge Plan B Home with family     DME Needed Upon Discharge  lift device;hospital bed (P)      Transition of Care Barriers None (P)      Why the patient remains in the hospital Requires continued medical care (P)         Post-Acute Status    Discharge Delays None known at this time (P)                     Pt is progressing toward the next level of care.   Pt is awaiting authorization from [employer] .  KONRAD contacted Maris from [facility] to check the status of authorization.  There are no updates at this moment.   KONRAD will continue to monitor pt needs.     Discharge Plan A and Plan B have been determined by review of patient's clinical status, future medical and therapeutic needs, and coverage/benefits for post-acute care in coordination with multidisciplinary team members.    Haleigh Perdomo MSW, LCSW  [facility]  Case Management Dept.

## 2024-11-07 NOTE — PLAN OF CARE
"[facility] Care Plan    POC reviewed with Percy Ramirez at 0300. Patient is unable to understanding. Questions and concerns addressed. No acute events today. Pt progressing toward goals. See below and flowsheets for full assessment and VS info.     -Patient able to sustain Tracheostomy collar throughout the night. No s/s of respiratory distress noted.  -Q4 neuros  -Labetalol PRN x1 for SBP >160  -CHG bath, shower cap, face shaved, linens exchanged.  -Tube feeds at goal rate    Is this a stroke patient? yes- Stroke booklet reviewed with patient, risk factors identified for patient and stroke booklet remains at bedside for ongoing education.     Care individualization: Sleep and rest promoted; care clustered    Neuro:  [place] Coma Scale  Best Eye Response: 1-->(E1) none  Best Motor Response: 2-->(M2) extension to pain  Best Verbal Response: 1-->(V1) none  [place] Coma Scale Score: 4  Assessment Qualifiers: no eye obstruction present  Pupil PERRLA: yes     24 hr Temp:  [98.1 °F (36.7 °C)-98.7 °F (37.1 °C)]     CV:   Rhythm: atrial rhythm  BP goals:   SBP < 160  MAP > 65    Resp:      Vent Mode: Spont  Set Rate: 16 BPM  Oxygen Concentration (%): 40  Vt Set: 500 mL  PEEP/CPAP: 5 cmH20  Pressure Support: 5 cmH20    Plan: trach in place    GI/:     Diet/Nutrition Received: NPO, tube feeding  Last Bowel Movement: 11/05/24  Voiding Characteristics: ureteral catheter    Intake/Output Summary (Last 24 hours) at 11/7/2024 0533  Last data filed at 11/7/2024 0501  Gross per 24 hour   Intake 1960 ml   Output 1100 ml   Net 860 ml     Unmeasured Output  Urine Occurrence: 1  Stool Occurrence: 0  Pad Count: 3    Labs/Accuchecks:  Recent Labs   Lab 11/07/24 0319   WBC 9.71   RBC 3.01*   HGB 8.4*   HCT 28.7*         Recent Labs   Lab 11/07/24 0319      K 4.8   CO2 26      BUN 51*   CREATININE 1.6*   ALKPHOS 97   ALT 15   AST 22   BILITOT 0.4    No results for input(s): "PROTIME", "INR", "APTT", "HEPANTIXA" in the " "last 168 hours. No results for input(s): "CPK", "CPKMB", "TROPONINI", "MB" in the last 168 hours.    Electrolytes: No replacement orders  Accuchecks: Q4H    Gtts:   D10W   Intravenous Continuous PRN           LDA/Wounds:    Mykel Risk Assessment  Sensory Perception: 1-->completely limited  Moisture: 3-->occasionally moist  Activity: 1-->bedfast  Mobility: 1-->completely immobile  Nutrition: 3-->adequate  Friction and Shear: 2-->potential problem  Mykel Score: 11  Is your mykel score 12 or less? yes enrolled in the [facility] program, mykel mobility score is 2 or less, they are on a immerse bed, if their moisture score is 2 or less, they do not have a sacral mepilex and instead have zinc barrier cream, and heel boots on  "

## 2024-11-07 NOTE — ASSESSMENT & PLAN NOTE
LEANDRO on CKD, Cr 1.8 on initial presentation to ED    Recent Labs     11/05/24  0221 11/06/24  0328 11/07/24  0319   CREATININE 1.6* 1.5* 1.6*   EGFRNORACEVR 42.8* 46.2* 42.8*        Plan  - LEANDRO is resolved, increased to 1.7  - Avoid nephrotoxins and renally dose meds for GFR listed above  - Monitor urine output, serial BMP, and adjust therapy as needed  - Q1H I&Os  - Free water boluses -- 400cc QID  - Pt has required straight catheterization during ICU stay however is currently voiding spontaneously; no need for rivers catheter placement at this time.

## 2024-11-08 VITALS
OXYGEN SATURATION: 100 % | DIASTOLIC BLOOD PRESSURE: 74 MMHG | SYSTOLIC BLOOD PRESSURE: 145 MMHG | WEIGHT: 184 LBS | TEMPERATURE: 99 F | HEIGHT: 68 IN | RESPIRATION RATE: 15 BRPM | BODY MASS INDEX: 27.89 KG/M2 | HEART RATE: 82 BPM

## 2024-11-08 LAB
ALBUMIN SERPL BCP-MCNC: 2 G/DL (ref 3.5–5.2)
ALLENS TEST: ABNORMAL
ALP SERPL-CCNC: 99 U/L (ref 40–150)
ALT SERPL W/O P-5'-P-CCNC: 22 U/L (ref 10–44)
ANION GAP SERPL CALC-SCNC: 11 MMOL/L (ref 8–16)
AST SERPL-CCNC: 34 U/L (ref 10–40)
BASOPHILS # BLD AUTO: 0.03 K/UL (ref 0–0.2)
BASOPHILS NFR BLD: 0.3 % (ref 0–1.9)
BILIRUB SERPL-MCNC: 0.4 MG/DL (ref 0.1–1)
BUN SERPL-MCNC: 50 MG/DL (ref 8–23)
CALCIUM SERPL-MCNC: 8.6 MG/DL (ref 8.7–10.5)
CHLORIDE SERPL-SCNC: 105 MMOL/L (ref 95–110)
CO2 SERPL-SCNC: 27 MMOL/L (ref 23–29)
CREAT SERPL-MCNC: 1.4 MG/DL (ref 0.5–1.4)
DELSYS: ABNORMAL
DIFFERENTIAL METHOD BLD: ABNORMAL
EOSINOPHIL # BLD AUTO: 0.6 K/UL (ref 0–0.5)
EOSINOPHIL NFR BLD: 6.8 % (ref 0–8)
ERYTHROCYTE [DISTWIDTH] IN BLOOD BY AUTOMATED COUNT: 18 % (ref 11.5–14.5)
EST. GFR  (NO RACE VARIABLE): 50.2 ML/MIN/1.73 M^2
FIO2: 60
FLOW: 10
GLUCOSE SERPL-MCNC: 97 MG/DL (ref 70–110)
HCO3 UR-SCNC: 32.4 MMOL/L (ref 24–28)
HCT VFR BLD AUTO: 25.6 % (ref 40–54)
HGB BLD-MCNC: 7.5 G/DL (ref 14–18)
IMM GRANULOCYTES # BLD AUTO: 0.08 K/UL (ref 0–0.04)
IMM GRANULOCYTES NFR BLD AUTO: 0.8 % (ref 0–0.5)
LYMPHOCYTES # BLD AUTO: 0.8 K/UL (ref 1–4.8)
LYMPHOCYTES NFR BLD: 8.3 % (ref 18–48)
MAGNESIUM SERPL-MCNC: 2.6 MG/DL (ref 1.6–2.6)
MCH RBC QN AUTO: 28.2 PG (ref 27–31)
MCHC RBC AUTO-ENTMCNC: 29.3 G/DL (ref 32–36)
MCV RBC AUTO: 96 FL (ref 82–98)
MODE: ABNORMAL
MONOCYTES # BLD AUTO: 1.2 K/UL (ref 0.3–1)
MONOCYTES NFR BLD: 12.6 % (ref 4–15)
NEUTROPHILS # BLD AUTO: 6.7 K/UL (ref 1.8–7.7)
NEUTROPHILS NFR BLD: 71.2 % (ref 38–73)
NRBC BLD-RTO: 0 /100 WBC
PCO2 BLDA: 43.6 MMHG (ref 35–45)
PH SMN: 7.48 [PH] (ref 7.35–7.45)
PHOSPHATE SERPL-MCNC: 3.8 MG/DL (ref 2.7–4.5)
PLATELET # BLD AUTO: 310 K/UL (ref 150–450)
PMV BLD AUTO: 10 FL (ref 9.2–12.9)
PO2 BLDA: 65 MMHG (ref 80–100)
POC BE: 9 MMOL/L
POC SATURATED O2: 94 % (ref 95–100)
POC TCO2: 34 MMOL/L (ref 23–27)
POCT GLUCOSE: 108 MG/DL (ref 70–110)
POCT GLUCOSE: 114 MG/DL (ref 70–110)
POCT GLUCOSE: 142 MG/DL (ref 70–110)
POCT GLUCOSE: 184 MG/DL (ref 70–110)
POCT GLUCOSE: 188 MG/DL (ref 70–110)
POCT GLUCOSE: 92 MG/DL (ref 70–110)
POCT GLUCOSE: 96 MG/DL (ref 70–110)
POTASSIUM SERPL-SCNC: 4.7 MMOL/L (ref 3.5–5.1)
PROT SERPL-MCNC: 6.3 G/DL (ref 6–8.4)
RBC # BLD AUTO: 2.66 M/UL (ref 4.6–6.2)
SAMPLE: ABNORMAL
SITE: ABNORMAL
SODIUM SERPL-SCNC: 143 MMOL/L (ref 136–145)
WBC # BLD AUTO: 9.43 K/UL (ref 3.9–12.7)

## 2024-11-08 PROCEDURE — 63600175 PHARM REV CODE 636 W HCPCS: Performed by: PSYCHIATRY & NEUROLOGY

## 2024-11-08 PROCEDURE — 94668 MNPJ CHEST WALL SBSQ: CPT

## 2024-11-08 PROCEDURE — 99900035 HC TECH TIME PER 15 MIN (STAT)

## 2024-11-08 PROCEDURE — 25000003 PHARM REV CODE 250: Performed by: INTERNAL MEDICINE

## 2024-11-08 PROCEDURE — 36600 WITHDRAWAL OF ARTERIAL BLOOD: CPT

## 2024-11-08 PROCEDURE — 25000242 PHARM REV CODE 250 ALT 637 W/ HCPCS

## 2024-11-08 PROCEDURE — 97112 NEUROMUSCULAR REEDUCATION: CPT

## 2024-11-08 PROCEDURE — 82803 BLOOD GASES ANY COMBINATION: CPT

## 2024-11-08 PROCEDURE — 94640 AIRWAY INHALATION TREATMENT: CPT

## 2024-11-08 PROCEDURE — 63600175 PHARM REV CODE 636 W HCPCS

## 2024-11-08 PROCEDURE — 80053 COMPREHEN METABOLIC PANEL: CPT

## 2024-11-08 PROCEDURE — 25000003 PHARM REV CODE 250

## 2024-11-08 PROCEDURE — 63600175 PHARM REV CODE 636 W HCPCS: Mod: JZ,JG

## 2024-11-08 PROCEDURE — 84100 ASSAY OF PHOSPHORUS: CPT

## 2024-11-08 PROCEDURE — 94761 N-INVAS EAR/PLS OXIMETRY MLT: CPT

## 2024-11-08 PROCEDURE — 85025 COMPLETE CBC W/AUTO DIFF WBC: CPT

## 2024-11-08 PROCEDURE — 27000221 HC OXYGEN, UP TO 24 HOURS

## 2024-11-08 PROCEDURE — 83735 ASSAY OF MAGNESIUM: CPT

## 2024-11-08 PROCEDURE — 99900026 HC AIRWAY MAINTENANCE (STAT)

## 2024-11-08 RX ORDER — FUROSEMIDE 10 MG/ML
40 INJECTION INTRAMUSCULAR; INTRAVENOUS ONCE
Status: COMPLETED | OUTPATIENT
Start: 2024-11-08 | End: 2024-11-08

## 2024-11-08 RX ORDER — CARVEDILOL 3.12 MG/1
3.12 TABLET ORAL 2 TIMES DAILY
Status: CANCELLED | OUTPATIENT
Start: 2024-11-08

## 2024-11-08 RX ADMIN — LABETALOL HYDROCHLORIDE 10 MG: 5 INJECTION, SOLUTION INTRAVENOUS at 04:11

## 2024-11-08 RX ADMIN — ATORVASTATIN CALCIUM 40 MG: 40 TABLET, FILM COATED ORAL at 08:11

## 2024-11-08 RX ADMIN — HEPARIN SODIUM 5000 UNITS: 5000 INJECTION INTRAVENOUS; SUBCUTANEOUS at 06:11

## 2024-11-08 RX ADMIN — INSULIN GLARGINE 25 UNITS: 100 INJECTION, SOLUTION SUBCUTANEOUS at 08:11

## 2024-11-08 RX ADMIN — DULOXETINE HYDROCHLORIDE 60 MG: 60 CAPSULE, DELAYED RELEASE ORAL at 08:11

## 2024-11-08 RX ADMIN — SODIUM CHLORIDE SOLN NEBU 3% 4 ML: 3 NEBU SOLN at 12:11

## 2024-11-08 RX ADMIN — INSULIN ASPART 9 UNITS: 100 INJECTION, SOLUTION INTRAVENOUS; SUBCUTANEOUS at 12:11

## 2024-11-08 RX ADMIN — ASPIRIN 81 MG CHEWABLE TABLET 81 MG: 81 TABLET CHEWABLE at 08:11

## 2024-11-08 RX ADMIN — LACOSAMIDE 200 MG: 50 TABLET, FILM COATED ORAL at 08:11

## 2024-11-08 RX ADMIN — SILODOSIN 8 MG: 8 CAPSULE ORAL at 08:11

## 2024-11-08 RX ADMIN — SODIUM CHLORIDE SOLN NEBU 3% 4 ML: 3 NEBU SOLN at 07:11

## 2024-11-08 RX ADMIN — HEPARIN SODIUM 5000 UNITS: 5000 INJECTION INTRAVENOUS; SUBCUTANEOUS at 02:11

## 2024-11-08 RX ADMIN — FUROSEMIDE 40 MG: 10 INJECTION, SOLUTION INTRAVENOUS at 01:11

## 2024-11-08 RX ADMIN — FAMOTIDINE 20 MG: 20 TABLET ORAL at 08:11

## 2024-11-08 RX ADMIN — SENNOSIDES AND DOCUSATE SODIUM 2 TABLET: 50; 8.6 TABLET ORAL at 08:11

## 2024-11-08 RX ADMIN — INSULIN ASPART 9 UNITS: 100 INJECTION, SOLUTION INTRAVENOUS; SUBCUTANEOUS at 04:11

## 2024-11-08 RX ADMIN — LEVETIRACETAM 750 MG: 500 SOLUTION ORAL at 08:11

## 2024-11-08 RX ADMIN — INSULIN ASPART 9 UNITS: 100 INJECTION, SOLUTION INTRAVENOUS; SUBCUTANEOUS at 05:11

## 2024-11-08 RX ADMIN — INSULIN ASPART 9 UNITS: 100 INJECTION, SOLUTION INTRAVENOUS; SUBCUTANEOUS at 08:11

## 2024-11-08 NOTE — NURSING
Pt discharged to [facility] via stretcher accompanied per [facility] ambulance service without noted distress.Nurse called report to RADHA Rivera.

## 2024-11-08 NOTE — PLAN OF CARE
"[facility] Care Plan    POC reviewed with Percy Ramirez and family at 0300. Patient and Family verbalized understanding. Questions and concerns addressed. No acute events today. Pt progressing toward goals. Will continue to monitor. See below and flowsheets for full assessment and VS info.   - PRN labetalol x2   - BM     Is this a stroke patient? yes- Stroke booklet reviewed with patient, risk factors identified for patient and stroke booklet remains at bedside for ongoing education.         Neuro:  [place] Coma Scale  Best Eye Response: 1-->(E1) none  Best Motor Response: 3-->(M3) flexion to pain  Best Verbal Response: 1-->(V1) none  [place] Coma Scale Score: 5  Assessment Qualifiers: patient not sedated/intubated, no eye obstruction present  Pupil PERRLA: yes     24 hr Temp:  [97.6 °F (36.4 °C)-99.6 °F (37.6 °C)]     CV:   Rhythm: atrial rhythm  BP goals:   SBP < 160  MAP > 65    Resp:      Vent Mode: Spont  Set Rate: 16 BPM  Oxygen Concentration (%): 40  Vt Set: 500 mL  PEEP/CPAP: 5 cmH20  Pressure Support: 5 cmH20    Plan: trach in place    GI/:     Diet/Nutrition Received: NPO, tube feeding  Last Bowel Movement: 11/08/24  Voiding Characteristics: external catheter    Intake/Output Summary (Last 24 hours) at 11/8/2024 0602  Last data filed at 11/8/2024 0500  Gross per 24 hour   Intake 1945.42 ml   Output 1100 ml   Net 845.42 ml     Unmeasured Output  Urine Occurrence: 1  Stool Occurrence: 1  Pad Count: 1    Labs/Accuchecks:  Recent Labs   Lab 11/08/24  0005   WBC 9.43   RBC 2.66*   HGB 7.5*   HCT 25.6*         Recent Labs   Lab 11/08/24  0005      K 4.7   CO2 27      BUN 50*   CREATININE 1.4   ALKPHOS 99   ALT 22   AST 34   BILITOT 0.4    No results for input(s): "PROTIME", "INR", "APTT", "HEPANTIXA" in the last 168 hours. No results for input(s): "CPK", "CPKMB", "TROPONINI", "MB" in the last 168 hours.    Electrolytes: N/A - electrolytes WDL  Accuchecks: Q4H    Gtts:   D10W   Intravenous " Continuous PRN           LDA/Wounds:    Mykel Risk Assessment  Sensory Perception: 1-->completely limited  Moisture: 3-->occasionally moist  Activity: 1-->bedfast  Mobility: 1-->completely immobile  Nutrition: 3-->adequate  Friction and Shear: 2-->potential problem  Mykel Score: 11  Is your mykel score 12 or less? yes enrolled in the [facility] program and mykel mobility score is 2 or less, they are on a immerse bed          [facility]

## 2024-11-08 NOTE — PLAN OF CARE
[facility]  Discharge Final Note    Primary Care Provider: Kasie Edmondson MD    Expected Discharge Date: 11/8/2024    Final Discharge Note (most recent)       Final Note - 11/08/24 1607          Final Note    Assessment Type Final Discharge Note (P)      Anticipated Discharge Disposition Long Term Acute Care (P)      What phone number can be called within the next 1-3 days to see how you are doing after discharge? [phone] (P)         Post-Acute Status    Coverage Payor: [employer] [facility] - [employer] CHOICES - (P)      Discharge Delays None known at this time (P)                      Important Message from Medicare  Important Message from Medicare regarding Discharge Appeal Rights: Other (comments) (Patient is going to LTAC facility.)     Date IMM was signed: 11/08/24  Time IMM was signed: 1439      Future Appointments   Date Time Provider Department Center   11/19/2024  9:00 AM Rachael Bagley NP [facility] FAM MED [place]   11/29/2024 10:00 AM Fernando Downs PA-C [facility] INTPM [facility] B   12/2/2024 10:20 AM [facility]2 LIMIT 500 LBS [facility] SCAN [facility]   12/5/2024 11:30 AM Marilele Smalls PA-C [facility] NEUROS8 [facility]   5/26/2025  9:20 AM Kasie Edmondson MD [facility] FAM MED [place]     Patient discharged  to [facility] at  [address] [place] [address] via [facility] [facility].  Report no:  .   Pt daughter mele khan was notified.      Discharge Plan A and Plan B have been determined by review of patient's clinical status, future medical and therapeutic needs, and coverage/benefits for post-acute care in coordination with multidisciplinary team members.    Haleigh Perdomo MSW, LCSW  [facility]  Case Management Dept.

## 2024-11-08 NOTE — DISCHARGE SUMMARY
[facility] - Neuro Critical Care  Neurocritical Care  Discharge Summary    Admit Date: 10/17/2024    Service Date: 11/08/2024    Discharge Date: 11/08/2024     Length of Stay: 22    Final Active Diagnoses:    Diagnosis Date Noted POA    PRINCIPAL PROBLEM:  Traumatic right-sided intracerebral hemorrhage without loss of consciousness [S06.340A] 10/18/2024 Yes    Debility [R53.81] 11/04/2024 Yes    Tracheostomy present [Z93.0] 11/04/2024 Not Applicable    PEG (percutaneous endoscopic gastrostomy) status [Z93.1] 11/04/2024 Not Applicable    Hyperkalemia [E87.5] 11/01/2024 No    Leukocytosis [D72.829] 10/27/2024 No    Altered mental status [R41.82] 10/24/2024 Yes    Intracranial hemorrhage [I62.9] 10/23/2024 Yes    Focal seizures [R56.9] 10/22/2024 No    Subdural hematoma [S06.5XAA] 10/22/2024 Yes    LEANDRO (acute kidney injury) [N17.9] 10/18/2024 Yes    Chronic kidney disease, stage 3a [N18.31] 05/03/2024 Yes    ILD (interstitial lung disease) [J84.9] 05/12/2020 Yes    Chronic combined systolic and diastolic heart failure [I50.42] 11/30/2018 Yes    Persistent atrial fibrillation [I48.19] 03/06/2017 Yes    Hyperlipidemia LDL goal <100 [E78.5] 07/29/2016 Yes    Obstructive sleep apnea treated with BiPAP [G47.33] 07/27/2012 Yes    Coronary artery disease [I25.10]  Yes    Major depressive disorder, recurrent episode, mild [F33.0]  Yes    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 Pt initially presented to [facility] ED yesterday ago after mechanical fall and was found to have 4mm parafalcine SDH. Repeat CTH was stable. [facility] and  instructed to hold his blood thinners. On 10/17/24 pt was lethargic and fell. He was brought back to  ED. Blood glucose was >600 and he was febrile (101F). CTH revealed 5cm R frontal IPH with 7mm MLS. Kcentra, 10mg vitamin K, and 3% HTS bolus were given. He was transferred to [facility] for higher level of care.     Hospital Course by Event: 10/19/2024: POD #1 from both his MI LS as well as his right subdural/intraparenchymal hemorrhage evacuation.  He is still intubated and although sedation is off, he is minimally responsive. Coreg doubled. NG tube placed and tube feeds started. Still on insulin drip.  10/20/2024: POD #2 s/p crani for SDH evacuation. Leukocytosis noted post-op, no accompanying fever/chills. Straight cath x1. Tachycardia responding to IVF boluses. Tube feeds started, will titrate to goal. Insulin gtt continued, plans to transition to subQ tomorrow.  10/21/2021: POD#3. EEG placed this morning, revealing multiple r-sided seizures. Keppra load completed and maintenance dose increased. Concern for LUE no longer WD, stat head CT showing new/increased hyperdensity in resection bed w/o worsening mass effect or MLS. 1L LR given for LEANDRO. Will continue to monitor Na.   10/22/24: POD#4. Addl seizures despite keppra load. Vimpat and propofol started yesterday with significant reduction in number. 1U PRBC transfused overnight. Required levo for short period of time after initiation of propofol.  10/23/24: POD#5.  Patient continues to be hyponatremic.  Free water volume increased.  Patient with total of 11 seizures yesterday.  Vimpat and propofol dosing increased with improvement.  10/24/24: POD#6. NGT adjusted overnight. No additional seizures on current AEM regimen. Platelets and Hgb decreasing, will order peripheral smear to assess for HIT  10/25/2024 POD#7. Addl seizure, Onfi added to AEM regimen. Drains removed by NSGY. Will attempt to wean propofol tomorrow.   10/26/2024 POD#8. MRI complete yx. Weaning  propofol today.   10/27/2024 POD#9. Patient began having seizures again overnight following cessation of propofol gtt. Resumed propofol at 10cc/hr for now, increased Keppra dose, and plan to attempt to wean again tomorrow. Persistent hyperglycemia on tube feeds, optimizing insulin regimen. Leukocytosis pending respiratory cultures.  10/28/2024 POD10. No addl seizures overnight. Propofol turned off this morning. Clobazam dosing decreased as well. Resp cultures negative, but will obtain blood and urine cultures. Cont abx. NSGY signing off.   10/29/2024 POD11. No seizures. Clobazam down to 10mg QHS. Infiltrate developing RLL, sputum culture likely due to colonization of resp tract per ID.   10/30/2024 POD12. No seizures. Clobazam discontinued. Abx discontinued, as no significant sputum production and resp status is stable. Discussion with family today - trach/PEG likely tomorrow.   10/31/2024 POD13. Single apneic episode yx evening with no EEG correlation. Spontaneously resolved. To OR today for trach/PEG. EEG cap to be removed.   11/1/24 POD14. Hyperkalemia overnight, resolving with lokelma and albuterol 10mg x1.   11/2/24 POD15. NAEON. Remains stable.  11/3/24 POD16. NAEON. Stable. Increased silodosin and optimized bowel regimen. Trach collar trials daily.  11/4/24 POD17: NAEON. Stable, spontaneously voiding. Free water flushes increased to 400cc QID. Q4 neuro checks. Tolerating trach collar well during the day.   11/5/24 POD 18: NAEON. Stable, tolerating trach collar well. Bowel regimen held this morning 2/2 an episode of diarrhea.   11/6/24 POD 19: Pt became tachypneic overnight requiring use of ventilator instead of trach collar. Pt accepted to [facility], pending insurance approval.   11/7/24 POD 20: Pt had an episode of hypotension today requiring a one time 250 cc NS bolus, coreg discontinued. Insurance denying [facility] approval -- peer to peer being scheduled for either today or tomorrow.    11/08/2024 POD 21: Pt accepted at [facility]; patient will be transferred there today. CXR this morning showing worsening right pleural effusion, 1x dose of Lasix given.     Physical Exam  Vitals and nursing note reviewed.   Constitutional:       General: He is not in acute distress.     Appearance: He is not toxic-appearing.   Eyes:      Conjunctiva/sclera: Conjunctivae normal.      Pupils: Pupils are equal, round, and reactive to light.   Cardiovascular:      Comments: Irregularly irregular  Pulmonary:      Effort: No respiratory distress.      Breath sounds: Rales (mild) present.      Comments: Trach collar, breathing spontaneously  Abdominal:      General: There is no distension.      Palpations: Abdomen is soft.      Tenderness: There is no abdominal tenderness.      Comments: PEG in place   Musculoskeletal:         General: Swelling (UE) present.   Skin:     General: Skin is dry.   Neurological:      Comments: Unarousable  PERRL  +Gag (minimal)  BLE flex to pain       Hospital Course by Problem:   * Traumatic right-sided intracerebral hemorrhage without loss of consciousness  83M PMHx of HTN, HLD, DM2, CAD s/p CABG x2, Afib on Coumadin, ILD on 4L of O2, HFrEF (45%), presenting as a transfer for a large traumatic R frontal IPH reversed with Kcentra and vitamin K with repeat INR 1.2.    q1h neuro checks, vitals, and I&O's  CBC, CMP, mag, and phos daily   Echo, EKG, CXR  SBP <160  Na goals > 135  NSGY signed off  SCDs; heparin for VTE prophylaxis  S/p Trach/PEG, neuro exam unchanged at this time  Pt accepted at [facility] and will be transferred there today.     Hyperkalemia  Hyperkalemia is likely due to LEANDRO.The patients most recent potassium results are listed below.  Recent Labs     11/06/24  0328 11/07/24  0319 11/08/24  0005   K 4.6 4.8 4.7       Plan  - Monitor for arrhythmias with EKG and/or continuous telemetry.   - Treat the hyperkalemia with Potassium Binders.   - Monitor potassium: Daily  -  The patient's hyperkalemia is resolved      Leukocytosis  Elevated WBC 13.5 today. Procal elevated at 0.82.  - Repeat procal at .74  - receiving zosyn, vanc discontinued  - Respiratory cx growing Candida lusitaniae     - ID consulted, candida likely respiratory colonizer and not needing treatment in immunocompetent patient  - No source of infection at this time - antibiotics discontinued    Altered mental status  See primary problem    Intracranial hemorrhage  See primary problem    Subdural hematoma  See primary problem    Focal seizures  EEG placed morning of 10/21 due to delay in return to expected LOC  -Multiple seizures throughout the day  -Has been loaded with keppra and vimpat  -Addl seizure evening of 10/24, Onfi added on.   -Current AED regimen:   - Keppra 750 BID   - Vimpat 200 BID  -No seizures 10/31  -Keppra dose reduced to 750mg BID to renally dose; EEG cap removed    LEANDRO (acute kidney injury)  LEANDRO on CKD, Cr 1.8 on initial presentation to ED    Recent Labs     11/06/24  0328 11/07/24  0319 11/08/24  0005   CREATININE 1.5* 1.6* 1.4   EGFRNORACEVR 46.2* 42.8* 50.2*        Plan  - LEANDRO is resolved, increased to 1.7  - Avoid nephrotoxins and renally dose meds for GFR listed above  - Monitor urine output, serial BMP, and adjust therapy as needed  - Q1H I&Os  - Free water boluses -- 400cc QID  - Pt has required straight catheterization during ICU stay however is currently voiding spontaneously; no need for rivers catheter placement at this time.     Chronic kidney disease, stage 3a  Creatine stable for now. BMP reviewed- noted Estimated Creatinine Clearance: 42.8 mL/min (based on SCr of 1.4 mg/dL). according to latest data. Based on current GFR, CKD stage is stage 3 - GFR 30-59.  Monitor UOP and serial BMP and adjust therapy as needed. Renally dose meds. Avoid nephrotoxic medications and procedures.    ILD (interstitial lung disease)  ILD on 4L of O2 at home    SpO2 goal > 88%  Duo nebs and  tiotropium  Currently on trach collar during day  Continue chest physiotherapy and HTN nebs to help thin secretions  CXR ordered for tomorrow AM    Chronic combined systolic and diastolic heart failure  06/29/2024 echo with EF 45%, was previously 30-40%    Repeat echo shows his EF is still 45%  HDS  1 time dose lasix 40mg IV given due to worsening diffuse bilateral airspace disease and worsening moderate right pleural effusion on CXR done on 11/08/2024.  Follow up I/Os    Persistent atrial fibrillation  In atrial fibrillation on admit  Hold Coumadin in setting of acute intraparenchymal hemorrhage  Heparin subcu for VTE prophylaxis    Hyperlipidemia LDL goal <100  Lipid panel showed low LDL, low HDL, low total cholesterol  Continue Atorvastatin    Obstructive sleep apnea treated with BiPAP  Tolerating trach collar and breathing spontaneously during the day.  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 Beta hydroxybutyrate and urine ketones negative. Serum CO2 22. Given SQ insulin at OSH.  [place] [facility] [place] Hb A1c 9.9% on admit.     Resuming scheduled insulin.    Patient's FSGs are uncontrolled due to hyperglycemia on current medication regimen.  Last A1c reviewed-   Lab Results   Component Value Date    LABA1C 6.8 10/09/2017    HGBA1C 9.9 (H) 10/17/2024     Most recent fingerstick glucose reviewed-   Recent Labs   Lab 11/08/24  0002 11/08/24  0526 11/08/24  0849 11/08/24  1216   POCTGLUCOSE 108 184* 142* 96       Current correctional scale  Medium  Increase anti-hyperglycemic dose as follows-   Antihyperglycemics (From admission, onward)      Start     Stop Route Frequency Ordered    11/03/24 1200  insulin aspart U-100 pen  9 Units         -- SubQ 6 times per day 11/03/24 1050    11/03/24 0900  insulin glargine U-100 (Lantus) pen 25 Units         -- SubQ Daily 11/03/24 0754    10/25/24 1133  insulin aspart U-100 pen 0-10 Units         -- SubQ Every 4 hours PRN 10/25/24 1036    10/22/24 1400  insulin regular in 0.9 % NaCl 100 unit/100 mL (1 unit/mL) infusion        Question Answer Comment   Insulin Rate Adjustment (DO NOT MODIFY ANSWER) [email or web address]    Enter initial dose from Infusion Protocol Chart (Units/hr): 1.5        10/25/24 1431 IV Continuous 10/22/24 1252    10/21/24 1145  insulin regular in 0.9 % NaCl 100 unit/100 mL (1 unit/mL) infusion        Question:  Enter initial dose from Infusion Protocol Chart (Units/hr):  Answer:  1.65    10/21/24 1343 IV Continuous 10/21/24 1042          Hold Oral hypoglycemics while patient is in the hospital.      Benign hypertension with chronic kidney disease, stage III  SBP goal < 160  PRN labetalol and hydralazine   Coreg dc'd due to hypotension      Major depressive disorder, recurrent episode, mild  Continue home duloxetine.        Goals of Care Treatment Preferences:  Code Status: Full Code    Living Will: Yes              Significant Results: All significant diagnostic studies from this admission reviewed prior to discharge.      Laboratory:  Lab Results   Component Value Date    HGBA1C 9.9 (H) 10/17/2024    HGBA1C 9.7 (H) 08/13/2024    LABA1C 6.8 10/09/2017    CHOL 108 (L) 10/17/2024    HDL 22 (L) 10/17/2024    HDL 29 (L) 12/20/2023    LDLCALC 62.8 (L) 10/17/2024    TRIG 129 10/28/2024    TRIG 91 12/20/2023    TSH 0.536 10/17/2024           Consultations:  IP CONSULT TO PHYSICAL MEDICINE REHAB  IP CONSULT TO REGISTERED DIETITIAN/NUTRITIONIST  IP CONSULT TO SOCIAL WORK/CASE MANAGEMENT  IP CONSULT TO NEUROSURGERY  IP CONSULT TO REGISTERED DIETITIAN/NUTRITIONIST  IP CONSULT TO REGISTERED DIETITIAN/NUTRITIONIST  IP CONSULT TO  INFECTIOUS DISEASES  IP CONSULT TO GENERAL SURGERY      Procedures:   Procedure(s) (LRB):  EGD, WITH PEG TUBE INSERTION (N/A)  CREATION, TRACHEOSTOMY (N/A) by Rosemary Garcia MD.      Medications:      Medication List        START taking these medications      aspirin 81 MG Chew  1 tablet (81 mg total) by Per G Tube route once daily.     famotidine 20 MG tablet  Commonly known as: PEPCID  1 tablet (20 mg total) by Per G Tube route once daily.     heparin (porcine) 5,000 unit/mL injection  Inject 1 mL (5,000 Units total) into the skin every 8 (eight) hours.     * insulin aspart U-100 100 unit/mL (3 mL) Inpn pen  Commonly known as: NovoLOG  Inject 0-10 Units into the skin every 4 (four) hours as needed (Hyperglycemia).     * insulin aspart U-100 100 unit/mL (3 mL) Inpn pen  Commonly known as: NovoLOG  Inject 9 Units into the skin every 4 (four) hours.     insulin glargine U-100 (Lantus) 100 unit/mL (3 mL) Inpn pen  Inject 25 Units into the skin once daily.     lacosamide 200 mg Tab tablet  Commonly known as: VIMPAT  1 tablet (200 mg total) by Per G Tube route every 12 (twelve) hours.     levetiracetam 500 mg/5 mL (5 mL) Soln  7.5 mLs (750 mg total) by Per G Tube route 2 (two) times daily.  Replaces: levETIRAcetam 500 MG Tab     senna-docusate 8.6-50 mg 8.6-50 mg per tablet  Commonly known as: PERICOLACE  2 tablets by Per G Tube route 2 (two) times daily.     silodosin 8 mg Cap capsule  Commonly known as: RAPAFLO  1 capsule (8 mg total) by Per G Tube route once daily.     sodium chloride 3% 3 % nebulizer solution  Take 4 mLs by nebulization every 6 (six) hours.           * This list has 2 medication(s) that are the same as other medications prescribed for you. Read the directions carefully, and ask your doctor or other care provider to review them with you.                CHANGE how you take these medications      atorvastatin 40 MG tablet  Commonly known as: LIPITOR  1 tablet (40 mg total) by Per G Tube route  "once daily.  What changed:   how to take this  when to take this     carvediloL 3.125 MG tablet  Commonly known as: COREG  1 tablet (3.125 mg total) by Per G Tube route 2 (two) times daily.  What changed:   how to take this  when to take this     DULoxetine 60 MG capsule  Commonly known as: CYMBALTA  Take 1 capsule (60 mg total) by mouth once daily. Give per PEG  What changed: additional instructions            STOP taking these medications      acetaminophen 500 MG tablet  Commonly known as: TYLENOL     albuterol 2.5 mg /3 mL (0.083 %) nebulizer solution  Commonly known as: PROVENTIL     albuterol 90 mcg/actuation inhaler  Commonly known as: PROVENTIL/VENTOLIN HFA     BD INSULIN SYRINGE ULTRA-FINE 1/2 mL 31 gauge x 15/64" Syrg  Generic drug: insulin syringe-needle U-100     BD ULTRA-FINE SHORT PEN NEEDLE 31 gauge x 5/16" Ndle  Generic drug: pen needle, diabetic     blood sugar diagnostic Strp     CERAVE DAILY MOISTURIZING Lotn  Generic drug: ceramides 1,3,6-II     cinnamon bark 500 mg capsule     clopidogreL 75 mg tablet  Commonly known as: PLAVIX     diclofenac sodium 1 % Gel  Commonly known as: VOLTAREN     fluticasone propionate 50 mcg/actuation nasal spray  Commonly known as: FLONASE     furosemide 40 MG tablet  Commonly known as: LASIX     gabapentin 300 MG capsule  Commonly known as: NEURONTIN     glimepiride 4 MG tablet  Commonly known as: AMARYL     HYDROcodone-acetaminophen 7.5-325 mg per tablet  Commonly known as: NORCO     insulin NPH-insulin regular (70/30) 100 unit/mL (70-30) injection     insulin syringe-needle U-100 0.3 mL 31 gauge x 15/64" Syrg     insulin syringe-needle U-100 0.5 mL 31 gauge x 5/16" Syrg     isosorbide mononitrate 30 MG 24 hr tablet  Commonly known as: IMDUR     lancets Misc     levETIRAcetam 500 MG Tab  Commonly known as: KEPPRA  Replaced by: levetiracetam 500 mg/5 mL (5 mL) Soln     LIDOcaine 5 %  Commonly known as: LIDODERM     meclizine 12.5 mg tablet  Commonly known as: " ANTIVERT     methocarbamoL 500 MG Tab  Commonly known as: ROBAXIN     mupirocin 2 % ointment  Commonly known as: BACTROBAN     nitroGLYCERIN 0.4 MG SL tablet  Commonly known as: NITROSTAT     omeprazole 20 MG capsule  Commonly known as: PRILOSEC     pravastatin 20 MG tablet  Commonly known as: PRAVACHOL     PRESERVISION LUTEIN ORAL     spironolactone 25 MG tablet  Commonly known as: ALDACTONE     STIOLTO RESPIMAT 2.5-2.5 mcg/actuation Mist  Generic drug: tiotropium-olodateroL     triamcinolone acetonide 0.1% 0.1 % cream  Commonly known as: KENALOG     valsartan 40 MG tablet  Commonly known as: DIOVAN     warfarin 5 MG tablet  Commonly known as: COUMADIN               Where to Get Your Medications        These medications were sent to [id number] - [place] - [address]  [address] [address]      Phone: [phone]   atorvastatin 40 MG tablet  carvediloL 3.125 MG tablet  DULoxetine 60 MG capsule  levetiracetam 500 mg/5 mL (5 mL) Soln       Information about where to get these medications is not yet available    Ask your nurse or doctor about these medications  aspirin 81 MG Chew  famotidine 20 MG tablet  heparin (porcine) 5,000 unit/mL injection  insulin aspart U-100 100 unit/mL (3 mL) Inpn pen  insulin aspart U-100 100 unit/mL (3 mL) Inpn pen  insulin glargine U-100 (Lantus) 100 unit/mL (3 mL) Inpn pen  lacosamide 200 mg Tab tablet  senna-docusate 8.6-50 mg 8.6-50 mg per tablet  silodosin 8 mg Cap capsule  sodium chloride 3% 3 % nebulizer solution       Diet: NPO, TFs    Activity: Bed rest    Disposition: Discharged to [facility] in stable condition.    Follow Up Plan:  - Patient will need follow up at receiving facility including CXR and follow up I/Os    This discharge took more than 30 minutes to complete.    Maricruz Vasquez DO  Neurocritical Care  [facility] - Neuro Critical Care

## 2024-11-08 NOTE — ASSESSMENT & PLAN NOTE
Hyperkalemia is likely due to LEANDRO.The patients most recent potassium results are listed below.  Recent Labs     11/06/24  0328 11/07/24  0319 11/08/24  0005   K 4.6 4.8 4.7       Plan  - Monitor for arrhythmias with EKG and/or continuous telemetry.   - Treat the hyperkalemia with Potassium Binders.   - Monitor potassium: Daily  - The patient's hyperkalemia is resolved

## 2024-11-08 NOTE — ASSESSMENT & PLAN NOTE
Tolerating trach collar and breathing spontaneously during the day.  Daily ABG  No significant abnormalities on today's ABG

## 2024-11-08 NOTE — PT/OT/SLP PROGRESS
Occupational Therapy   Treatment    Name: Percy Ramirez  MRN: [id number]  Admitting Diagnosis:  Traumatic right-sided intracerebral hemorrhage without loss of consciousness  8 Days Post-Op    Recommendations:     Discharge Recommendations: Moderate Intensity Therapy  Discharge Equipment Recommendations:  lift device, hospital bed  Barriers to discharge:  None    Assessment:     Percy Ramirez is a 84 y.o. male with a medical diagnosis of Traumatic right-sided intracerebral hemorrhage without loss of consciousness.  He presents with performance deficits affecting function are impaired endurance, impaired balance, decreased lower extremity function, decreased safety awareness, visual deficits, impaired sensation, impaired self care skills, impaired cognition, decreased coordination, impaired functional mobility, abnormal tone, decreased upper extremity function, decreased ROM, impaired coordination.     Rehab Prognosis:  Fair; patient would benefit from acute skilled OT services to address these deficits and reach maximum level of function.       Plan:     Patient to be seen 2 x/week to address the above listed problems via self-care/home management, cognitive retraining, therapeutic activities, therapeutic exercises, neuromuscular re-education  Plan of Care Expires: 12/03/24  Plan of Care Reviewed with: patient    Subjective   Patient:  Nonverbal   Pain/Comfort:  Pain Rating 1: 0/10  Pain Rating Post-Intervention 1: 0/10    Objective:     Communicated with: Nurse prior to session.  Patient found supine with bed alarm, blood pressure cuff, pulse ox (continuous), restraints, SCD, telemetry, peripheral IV, pressure relief boots, Tracheostomy, PEG Tube upon OT entry to room.    General Precautions: Standard, aspiration, fall, NPO    Orthopedic Precautions:N/A  Braces: N/A  Respiratory Status:  trach     Occupational Performance:     Bed Mobility:    Patient completed Rolling/Turning to Left with  dependent  Patient  completed Rolling/Turning to Right with dependent     Functional Mobility/Transfers:  Dependent drawsheet transfers    Activities of Daily Living:  Feeding:  NPO    Grooming: dependence while supine    [facility] 6 Click ADL: 6    Treatment & Education:  Daily orientation provided.    PROM performed bilateral UE/LEs one set x 6 rep in all planes of motion with stretches provided at end range; sustained stretch provided for ankle dorsiflexion.  Assistance and facilitation provided for upward rotation of the scapula during shoulder flexion and abduction to promote orientation of glenoid fossa of scapula to humeral head for prevention of shoulder pain.   Patient unable to follow commands.   Provided multi-sensory stimulation to prevent sensory deprivation and improve clinical outcomes.  Moderate edema noted left UE; positioning provided in elevation.  Positioning provided for midline orientation with bilateral UEs elevated and heels lifted off mattress.   Family not present during the session.     Patient left supine with all lines intact, call button in reach, and bed alarm on    GOALS:   Multidisciplinary Problems       Occupational Therapy Goals          Problem: Occupational Therapy    Goal Priority Disciplines Outcome Interventions   Occupational Therapy Goal     OT, PT/OT Progressing    Description: Goals set 11/5 with an expiration date, 12/3:  Patient will increase functional independence with ADLs by performing:    Patient will demonstrate rolling to the right with max assist.  Not met   Patient will demonstrate rolling to the left with max assist.   Not met  Patient will demonstrate supine -sit with max assist.   Not met  Patient will demonstrate squat pivot transfers with max assist.   Not met  Patient will demonstrate grooming while seated with max assist.   Not met  Patient will demonstrate upper body dressing with max assist while seated EOB.   Not met  Patient will demonstrate lower body dressing with max  assist while seated EOB.   Not met  Patient will demonstrate toileting with max assist.   Not met  Patient will demonstrate bathing while seated EOB with max assist.   Not met  Patient's family / caregiver will demonstrate independence and safety with assisting patient with self-care skills and functional mobility.     Not met  Patient's family / caregiver will demonstrate independence with providing ROM and changes in bed positioning.   Not met  Patient and/or patient's family will verbalize understanding of stroke prevention guidelines, personal risk factors and stroke warning signs via teachback method.  Not met                                Time Tracking:     OT Date of Treatment: 11/08/24  OT Start Time: 0350  OT Stop Time: 0400  OT Total Time (min): 10 min    Billable Minutes:Neuromuscular Re-education 10    OT/MARIO: OT          11/8/2024

## 2024-11-08 NOTE — ASSESSMENT & PLAN NOTE
Creatine stable for now. BMP reviewed- noted Estimated Creatinine Clearance: 42.8 mL/min (based on SCr of 1.4 mg/dL). according to latest data. Based on current GFR, CKD stage is stage 3 - GFR 30-59.  Monitor UOP and serial BMP and adjust therapy as needed. Renally dose meds. Avoid nephrotoxic medications and procedures.

## 2024-11-08 NOTE — SUBJECTIVE & OBJECTIVE
Interval History:  NAEON. Pt tolerated trach collar well overnight.    Review of Systems   Unable to perform ROS: Acuity of condition       Objective:     Vitals:  Temp: 98.9 °F (37.2 °C)  Pulse: 95  Rhythm: atrial rhythm  BP: (!) 160/73  MAP (mmHg): 105  Resp: 15  SpO2: 100 %  Oxygen Concentration (%): 60  Vent Mode: Standby  Pressure Support: 5 cmH20  PEEP/CPAP: 5 cmH20  Peak Airway Pressure: 3 cmH20  Mean Airway Pressure: 3.7 cmH20  Plateau Pressure: 13 cmH20    Temp  Min: 97.6 °F (36.4 °C)  Max: 99 °F (37.2 °C)  Pulse  Min: 66  Max: 95  BP  Min: 125/58  Max: 167/80  MAP (mmHg)  Min: 84  Max: 117  Resp  Min: 14  Max: 28  SpO2  Min: 90 %  Max: 100 %  Oxygen Concentration (%)  Min: 40  Max: 60    11/07 0701 - 11/08 0700  In: 1945.4   Out: 1100 [Urine:1100]   Unmeasured Output  Urine Occurrence: 1  Stool Occurrence: 1  Pad Count: 1        Physical Exam  Vitals and nursing note reviewed.   Constitutional:       General: He is not in acute distress.     Appearance: He is not toxic-appearing.   Eyes:      Conjunctiva/sclera: Conjunctivae normal.      Pupils: Pupils are equal, round, and reactive to light.   Cardiovascular:      Comments: Irregularly irregular  Pulmonary:      Effort: No respiratory distress.      Breath sounds: Rales (mild) present.      Comments: Trach collar, breathing spontaneously  Abdominal:      General: There is no distension.      Palpations: Abdomen is soft.      Tenderness: There is no abdominal tenderness.      Comments: PEG in place   Musculoskeletal:         General: Swelling (UE) present.   Skin:     General: Skin is dry.   Neurological:      Comments: Unarousable  PERRL  +Gag (minimal)  BLE flex to pain            Medications:  [id number]    Scheduledaspirin, 81 mg, Daily  atorvastatin, 40 mg, Daily  DULoxetine, 60 mg, Daily  famotidine, 20 mg, Daily  heparin (porcine), 5,000 Units, Q8H  insulin aspart U-100, 9 Units, 6 times per day  insulin glargine U-100, 25 Units,  Daily  lacosamide, 200 mg, Q12H  levetiracetam, 750 mg, BID  senna-docusate 8.6-50 mg, 2 tablet, BID  silodosin, 8 mg, Daily  sodium chloride 3%, 4 mL, Q6H    PRNacetaminophen, 650 mg, Q6H PRN  D10W, , Continuous PRN  dextrose 10%, 12.5 g, PRN  dextrose 10%, 25 g, PRN  glucagon (human recombinant), 1 mg, PRN  hydrALAZINE, 10 mg, Q4H PRN  insulin aspart U-100, 0-10 Units, Q4H PRN  labetalol, 10 mg, Q4H PRN  ondansetron, 4 mg, Q8H PRN          Diet  Diet NPO Except for: Medication  Diet NPO Except for: Medication

## 2024-11-08 NOTE — PLAN OF CARE
"[facility] Care Plan  POC reviewed with Percy Ramirez and family at 1400. Family verbalized understanding. Questions and concerns addressed. No acute events today. Pt progressing toward goals. Will continue to monitor. See below and flowsheets for full assessment and VS info.   LTACH transfer pending          Is this a stroke patient? yes- Stroke booklet reviewed with family, risk factors identified for patient and stroke booklet remains at bedside for ongoing education.     Care individualization: TV on    Neuro:  [place] Coma Scale  Best Eye Response: 1-->(E1) none  Best Motor Response: 3-->(M3) flexion to pain  Best Verbal Response: 1-->(V1) none  [place] Coma Scale Score: 5  Assessment Qualifiers: patient not sedated/intubated  Pupil PERRLA: yes     24 hr Temp:  [98.2 °F (36.8 °C)-98.9 °F (37.2 °C)]     CV:   Rhythm: atrial rhythm  BP goals:   SBP < 160  MAP > 65    Resp:      Vent Mode: Standby  Set Rate: 16 BPM  Oxygen Concentration (%): 60  Vt Set: 500 mL  PEEP/CPAP: 5 cmH20  Pressure Support: 5 cmH20    Plan: N/A    GI/:     Diet/Nutrition Received: tube feeding  Last Bowel Movement: 11/08/24  Voiding Characteristics: external catheter    Intake/Output Summary (Last 24 hours) at 11/8/2024 1513  Last data filed at 11/8/2024 1500  Gross per 24 hour   Intake 1650 ml   Output 1200 ml   Net 450 ml     Unmeasured Output  Urine Occurrence: 1  Stool Occurrence: 1  Pad Count: 1    Labs/Accuchecks:  Recent Labs   Lab 11/08/24  0005   WBC 9.43   RBC 2.66*   HGB 7.5*   HCT 25.6*         Recent Labs   Lab 11/08/24  0005      K 4.7   CO2 27      BUN 50*   CREATININE 1.4   ALKPHOS 99   ALT 22   AST 34   BILITOT 0.4    No results for input(s): "PROTIME", "INR", "APTT", "HEPANTIXA" in the last 168 hours. No results for input(s): "CPK", "CPKMB", "TROPONINI", "MB" in the last 168 hours.    Electrolytes: N/A - electrolytes WDL  Accuchecks: Q6H    Gtts:   D10W   Intravenous Continuous PRN     "       LDA/Wounds:    Mykel Risk Assessment  Sensory Perception: 1-->completely limited  Moisture: 3-->occasionally moist  Activity: 1-->bedfast  Mobility: 1-->completely immobile  Nutrition: 3-->adequate  Friction and Shear: 2-->potential problem  Mykel Score: 11    Is your mykel score 12 or less? no            Restraints:   Restraint Order  Length of Order: Order good for next 24 hours or when removed.  Date that the current order will : 10/27/24  Time that the current order will :   Order Upon Application: Yes    [facility]

## 2024-11-08 NOTE — ASSESSMENT & PLAN NOTE
LEANDRO on CKD, Cr 1.8 on initial presentation to ED    Recent Labs     11/06/24  0328 11/07/24  0319 11/08/24  0005   CREATININE 1.5* 1.6* 1.4   EGFRNORACEVR 46.2* 42.8* 50.2*        Plan  - LEANDRO is resolved, increased to 1.7  - Avoid nephrotoxins and renally dose meds for GFR listed above  - Monitor urine output, serial BMP, and adjust therapy as needed  - Q1H I&Os  - Free water boluses -- 400cc QID  - Pt has required straight catheterization during ICU stay however is currently voiding spontaneously; no need for rivers catheter placement at this time.

## 2024-11-08 NOTE — PLAN OF CARE
SW received a call from Maris at [facility] and  Nhan at [facility] to inform me that pt authorization for [facility] have been approved.   SW inform both parties that the pt will most likely discharge today.   SW will continue to monitor pt needs.       Discharge Plan A and Plan B have been determined by review of patient's clinical status, future medical and therapeutic needs, and coverage/benefits for post-acute care in coordination with multidisciplinary team members.    Haleigh Perdomo MSW, FIONAW  [facility]  Case Management Dept.

## 2024-11-08 NOTE — ASSESSMENT & PLAN NOTE
83M PMHx of HTN, HLD, DM2, CAD s/p CABG x2, Afib on Coumadin, ILD on 4L of O2, HFrEF (45%), presenting as a transfer for a large traumatic R frontal IPH reversed with Kcentra and vitamin K with repeat INR 1.2.    q1h neuro checks, vitals, and I&O's  CBC, CMP, mag, and phos daily   Echo, EKG, CXR  SBP <160  Na goals > 135  NSGY signed off  SCDs; heparin for VTE prophylaxis  S/p Trach/PEG, neuro exam unchanged at this time  Pt accepted at [facility] and will be transferred there today.

## 2024-11-08 NOTE — ASSESSMENT & PLAN NOTE
06/29/2024 echo with EF 45%, was previously 30-40%    Repeat echo shows his EF is still 45%  HDS  1 time dose lasix 40mg IV given due to worsening diffuse bilateral airspace disease and worsening moderate right pleural effusion on CXR done on 11/08/2024.  Follow up I/Os

## 2024-12-05 ENCOUNTER — TELEPHONE (OUTPATIENT)
Dept: NEUROSURGERY | Facility: CLINIC | Age: 84
End: 2024-12-05
Payer: MEDICARE

## 2024-12-05 NOTE — TELEPHONE ENCOUNTER
Called and spoke with pt's daughter in regarding to the appt with Marielle Smalls PA-C. Canceled appt. Pt is  per daughter.

## 2025-02-28 ENCOUNTER — EXTERNAL HOME HEALTH (OUTPATIENT)
Dept: HOME HEALTH SERVICES | Facility: HOSPITAL | Age: 85
End: 2025-02-28
Payer: MEDICARE

## 2025-03-13 ENCOUNTER — DOCUMENT SCAN (OUTPATIENT)
Dept: HOME HEALTH SERVICES | Facility: HOSPITAL | Age: 85
End: 2025-03-13
Payer: MEDICARE

## 2025-04-09 ENCOUNTER — DOCUMENT SCAN (OUTPATIENT)
Dept: HOME HEALTH SERVICES | Facility: HOSPITAL | Age: 85
End: 2025-04-09
Payer: MEDICARE

## 2025-04-10 ENCOUNTER — EXTERNAL HOME HEALTH (OUTPATIENT)
Dept: HOME HEALTH SERVICES | Facility: HOSPITAL | Age: 85
End: 2025-04-10
Payer: MEDICARE

## 2025-05-14 ENCOUNTER — DOCUMENT SCAN (OUTPATIENT)
Dept: HOME HEALTH SERVICES | Facility: HOSPITAL | Age: 85
End: 2025-05-14
Payer: MEDICARE

## (undated) DEVICE — DURAPREP SURG SCRUB 26ML

## (undated) DEVICE — STOCKINETTE 2INX36

## (undated) DEVICE — SUT 4/0 18IN NUROLON BLK B

## (undated) DEVICE — SPLINT PLASTER FS 5IN X 30IN

## (undated) DEVICE — TRAY NEURO OMC

## (undated) DEVICE — LINER GLOVE POWDERFREE 8

## (undated) DEVICE — CHLORAPREP 10.5 ML APPLICATOR

## (undated) DEVICE — GAUZE SPONGE 4X4 12PLY

## (undated) DEVICE — LINER GLOVE POWDERFREE SZ 7

## (undated) DEVICE — HOLDER TRACH TUBE NECKBAND

## (undated) DEVICE — DRESSING XEROFORM 1X8IN

## (undated) DEVICE — SLEEVE SCD EXPRESS CALF MEDIUM

## (undated) DEVICE — BIT DRILL WIRE PASS MICRO 1.3M

## (undated) DEVICE — DRAPE T THYROID STERILE

## (undated) DEVICE — SEE MEDLINE ITEM 152522

## (undated) DEVICE — DRESSING TELFA STRL 4X3 LF

## (undated) DEVICE — GLOVE SURGICAL LATEX SZ 6.5

## (undated) DEVICE — HOOK LONE STAR BLUNT 12MM

## (undated) DEVICE — KIT SURGIFLO HEMOSTATIC MATRIX

## (undated) DEVICE — UNDERGLOVE BIOGEL PI SZ 6.5 LF

## (undated) DEVICE — SEE MEDLINE ITEM 152622

## (undated) DEVICE — FORCEP SPETZLER MALIS 8IN 1MM

## (undated) DEVICE — SUT 4/0 18IN ETHILON BL P3

## (undated) DEVICE — BLADE SURG STAINLESS STEEL #15

## (undated) DEVICE — CORD BIPOLAR 12 FOOT

## (undated) DEVICE — DRAIN HEMOVAC 3/16 LARGE

## (undated) DEVICE — COVER OVERHEAD SURG LT BLUE

## (undated) DEVICE — BAG DRAIN ANTI REFLUX 2000ML

## (undated) DEVICE — SEE MEDLINE ITEM 146292

## (undated) DEVICE — DRAPE CORETEMP FLD WRM 56X62IN

## (undated) DEVICE — SEE MEDLINE ITEM 152515

## (undated) DEVICE — SUT ETHILON 3-0 PS2 18 BLK

## (undated) DEVICE — SEE MEDLINE ITEM 107746

## (undated) DEVICE — NDL HYPO STD REG BVL 18GX1.5IN

## (undated) DEVICE — DRESSING SURGICAL 3/4X3/4

## (undated) DEVICE — TRAPEZIECTOMY TOOL

## (undated) DEVICE — SUT 3-0 12-18IN SILK

## (undated) DEVICE — TRACH TUBE CUFF FLEX DISP 8.5

## (undated) DEVICE — SEE ITEM #2784

## (undated) DEVICE — COVER PROXIMA MAYO STAND

## (undated) DEVICE — Device

## (undated) DEVICE — ELECTRODE REM PLYHSV RETURN 9

## (undated) DEVICE — SUT VICRYL PLUS 3-0 SH 18IN

## (undated) DEVICE — BLANKET LOWER BODY 55.9X40.2IN

## (undated) DEVICE — TOURNIQUET SB QC DP 18X4IN

## (undated) DEVICE — SUT ETHILON 3/0 18IN PS-1

## (undated) DEVICE — STAPLER SKIN PROXIMATE WIDE

## (undated) DEVICE — GLOVE SURGICAL LATEX SZ 8

## (undated) DEVICE — GOWN SURGICAL X-LARGE

## (undated) DEVICE — MARKER SKIN RULER STERILE

## (undated) DEVICE — SUT PROLENE 2-0 30 SH

## (undated) DEVICE — DRAPE CRANIOTOMY T SURG STRL

## (undated) DEVICE — SEE L#120831

## (undated) DEVICE — CHLORAPREP W TINT 26ML APPL

## (undated) DEVICE — GLOVE SURGICAL LATEX SZ 7

## (undated) DEVICE — TUBING SUC UNIV W/CONN 12FT

## (undated) DEVICE — SEE MEDLINE ITEM 157173

## (undated) DEVICE — SOL 9P NACL IRR PIC IL

## (undated) DEVICE — KIT PEG PULL STANDARD 20F

## (undated) DEVICE — SLING ORTHOPEDIC MEDIUM

## (undated) DEVICE — SEE ITEM #58461

## (undated) DEVICE — SYR 10CC LUER LOCK

## (undated) DEVICE — SUT CHROMIC 2-0 SH 27IN BRN

## (undated) DEVICE — DIFFUSER

## (undated) DEVICE — ELECTRODE BLADE INSULATED 1 IN

## (undated) DEVICE — STOCKINET 4INX48

## (undated) DEVICE — PAD CAST 2 IN X 4YDS STERILE

## (undated) DEVICE — BUCKET PLASTER DISPOSABLE

## (undated) DEVICE — GLOVE BIOGEL PI MICRO SZ 6.5

## (undated) DEVICE — BURR ROUTER TAPERED

## (undated) DEVICE — SHEET DRAPE FAN-FOLDED 3/4

## (undated) DEVICE — DRAIN BLAKE HUBLS 10F RD

## (undated) DEVICE — DRAPE PLASTIC U 60X72

## (undated) DEVICE — PINS SKULL ADULT MAYFIELD
Type: IMPLANTABLE DEVICE | Site: CRANIAL | Status: NON-FUNCTIONAL
Removed: 2024-10-18

## (undated) DEVICE — SEE MEDLINE ITEM 146345

## (undated) DEVICE — CARTRIDGE OIL

## (undated) DEVICE — TUBE FRAZIER 5MM 2FT SOFT TIP

## (undated) DEVICE — ROUTER TAPERED 2.3MM

## (undated) DEVICE — LUBRICANT SURGILUBE 2 OZ

## (undated) DEVICE — BOWL STERILE LARGE 32OZ

## (undated) DEVICE — CANISTER SUCTION 2 LTR

## (undated) DEVICE — BLADE SURG CARBON STEEL SZ11

## (undated) DEVICE — PAD PREP 50/CA

## (undated) DEVICE — EVACUATOR WOUND BULB 100CC

## (undated) DEVICE — SEE MEDLINE ITEM 152528

## (undated) DEVICE — GOWN POLY REINF BRTH SLV XL

## (undated) DEVICE — DRESSING SURGICAL 1/2X1/2

## (undated) DEVICE — CLIPS RANEY SCALP FFS/ASSY

## (undated) DEVICE — SEE MEDLINE ITEM 157117

## (undated) DEVICE — DRAPE C-ARM FOR MOBILE XRAY

## (undated) DEVICE — DRESSING SURGICAL 1X3

## (undated) DEVICE — SPONGE PATTY NEURO SGL 1X6IN

## (undated) DEVICE — CORD FOR BIPOLAR FORCEPS 12